# Patient Record
Sex: FEMALE | Race: WHITE | NOT HISPANIC OR LATINO | Employment: OTHER | URBAN - METROPOLITAN AREA
[De-identification: names, ages, dates, MRNs, and addresses within clinical notes are randomized per-mention and may not be internally consistent; named-entity substitution may affect disease eponyms.]

---

## 2017-01-01 ENCOUNTER — APPOINTMENT (OUTPATIENT)
Dept: CT IMAGING | Facility: HOSPITAL | Age: 82
End: 2017-01-01

## 2017-01-01 ENCOUNTER — HOSPITAL ENCOUNTER (INPATIENT)
Facility: HOSPITAL | Age: 82
LOS: 2 days | Discharge: HOME | End: 2017-01-03
Attending: EMERGENCY MEDICINE | Admitting: INTERNAL MEDICINE

## 2017-01-01 ENCOUNTER — APPOINTMENT (OUTPATIENT)
Dept: GENERAL RADIOLOGY | Facility: HOSPITAL | Age: 82
End: 2017-01-01

## 2017-01-01 DIAGNOSIS — J18.9 PNEUMONIA OF RIGHT UPPER LOBE DUE TO INFECTIOUS ORGANISM: Primary | ICD-10-CM

## 2017-01-01 LAB
ALBUMIN SERPL-MCNC: 4.2 G/DL (ref 3.5–5.2)
ALBUMIN/GLOB SERPL: 1.9 G/DL
ALP SERPL-CCNC: 90 U/L (ref 39–117)
ALT SERPL W P-5'-P-CCNC: 14 U/L (ref 1–33)
ANION GAP SERPL CALCULATED.3IONS-SCNC: 13.9 MMOL/L
AST SERPL-CCNC: 25 U/L (ref 1–32)
B PERT DNA SPEC QL NAA+PROBE: NOT DETECTED
BASOPHILS # BLD AUTO: 0.02 10*3/MM3 (ref 0–0.2)
BASOPHILS NFR BLD AUTO: 0.4 % (ref 0–1.5)
BILIRUB SERPL-MCNC: 0.5 MG/DL (ref 0.1–1.2)
BUN BLD-MCNC: 11 MG/DL (ref 8–23)
BUN/CREAT SERPL: 16.4 (ref 7–25)
C PNEUM DNA NPH QL NAA+NON-PROBE: NOT DETECTED
CALCIUM SPEC-SCNC: 8.8 MG/DL (ref 8.6–10.5)
CHLORIDE SERPL-SCNC: 87 MMOL/L (ref 98–107)
CO2 SERPL-SCNC: 26.1 MMOL/L (ref 22–29)
CREAT BLD-MCNC: 0.67 MG/DL (ref 0.57–1)
D-LACTATE SERPL-SCNC: 0.9 MMOL/L (ref 0.5–2)
DEPRECATED RDW RBC AUTO: 53.7 FL (ref 37–54)
EOSINOPHIL # BLD AUTO: 0.01 10*3/MM3 (ref 0–0.7)
EOSINOPHIL NFR BLD AUTO: 0.2 % (ref 0.3–6.2)
ERYTHROCYTE [DISTWIDTH] IN BLOOD BY AUTOMATED COUNT: 15.1 % (ref 11.7–13)
FLUAV H1 2009 PAND RNA NPH QL NAA+PROBE: NOT DETECTED
FLUAV H1 HA GENE NPH QL NAA+PROBE: NOT DETECTED
FLUAV H3 RNA NPH QL NAA+PROBE: DETECTED
FLUAV SUBTYP SPEC NAA+PROBE: NOT DETECTED
FLUBV RNA ISLT QL NAA+PROBE: NOT DETECTED
GFR SERPL CREATININE-BSD FRML MDRD: 83 ML/MIN/1.73
GLOBULIN UR ELPH-MCNC: 2.2 GM/DL
GLUCOSE BLD-MCNC: 110 MG/DL (ref 65–99)
HADV DNA SPEC NAA+PROBE: NOT DETECTED
HCOV 229E RNA SPEC QL NAA+PROBE: NOT DETECTED
HCOV HKU1 RNA SPEC QL NAA+PROBE: NOT DETECTED
HCOV NL63 RNA SPEC QL NAA+PROBE: NOT DETECTED
HCOV OC43 RNA SPEC QL NAA+PROBE: NOT DETECTED
HCT VFR BLD AUTO: 31.8 % (ref 35.6–45.5)
HGB BLD-MCNC: 10.5 G/DL (ref 11.9–15.5)
HMPV RNA NPH QL NAA+NON-PROBE: NOT DETECTED
HOLD SPECIMEN: NORMAL
HOLD SPECIMEN: NORMAL
HPIV1 RNA SPEC QL NAA+PROBE: NOT DETECTED
HPIV2 RNA SPEC QL NAA+PROBE: NOT DETECTED
HPIV3 RNA NPH QL NAA+PROBE: NOT DETECTED
HPIV4 P GENE NPH QL NAA+PROBE: NOT DETECTED
IMM GRANULOCYTES # BLD: 0.03 10*3/MM3 (ref 0–0.03)
IMM GRANULOCYTES NFR BLD: 0.5 % (ref 0–0.5)
LYMPHOCYTES # BLD AUTO: 0.79 10*3/MM3 (ref 0.9–4.8)
LYMPHOCYTES NFR BLD AUTO: 13.9 % (ref 19.6–45.3)
M PNEUMO IGG SER IA-ACNC: NOT DETECTED
MCH RBC QN AUTO: 32 PG (ref 26.9–32)
MCHC RBC AUTO-ENTMCNC: 33 G/DL (ref 32.4–36.3)
MCV RBC AUTO: 97 FL (ref 80.5–98.2)
MONOCYTES # BLD AUTO: 0.88 10*3/MM3 (ref 0.2–1.2)
MONOCYTES NFR BLD AUTO: 15.5 % (ref 5–12)
NEUTROPHILS # BLD AUTO: 3.95 10*3/MM3 (ref 1.9–8.1)
NEUTROPHILS NFR BLD AUTO: 69.5 % (ref 42.7–76)
NT-PROBNP SERPL-MCNC: 965.7 PG/ML (ref 0–1800)
PLATELET # BLD AUTO: 195 10*3/MM3 (ref 140–500)
PMV BLD AUTO: 9.9 FL (ref 6–12)
POTASSIUM BLD-SCNC: 4.1 MMOL/L (ref 3.5–5.2)
PROT SERPL-MCNC: 6.4 G/DL (ref 6–8.5)
RBC # BLD AUTO: 3.28 10*6/MM3 (ref 3.9–5.2)
RHINOVIRUS RNA SPEC NAA+PROBE: NOT DETECTED
RSV RNA NPH QL NAA+NON-PROBE: NOT DETECTED
SODIUM BLD-SCNC: 127 MMOL/L (ref 136–145)
TROPONIN T SERPL-MCNC: <0.01 NG/ML (ref 0–0.03)
WBC NRBC COR # BLD: 5.68 10*3/MM3 (ref 4.5–10.7)
WHOLE BLOOD HOLD SPECIMEN: NORMAL
WHOLE BLOOD HOLD SPECIMEN: NORMAL

## 2017-01-01 PROCEDURE — 83605 ASSAY OF LACTIC ACID: CPT | Performed by: NURSE PRACTITIONER

## 2017-01-01 PROCEDURE — 71020 HC CHEST PA AND LATERAL: CPT

## 2017-01-01 PROCEDURE — 87486 CHLMYD PNEUM DNA AMP PROBE: CPT | Performed by: INTERNAL MEDICINE

## 2017-01-01 PROCEDURE — 25010000002 METHYLPREDNISOLONE PER 125 MG: Performed by: INTERNAL MEDICINE

## 2017-01-01 PROCEDURE — 85025 COMPLETE CBC W/AUTO DIFF WBC: CPT | Performed by: EMERGENCY MEDICINE

## 2017-01-01 PROCEDURE — 94640 AIRWAY INHALATION TREATMENT: CPT

## 2017-01-01 PROCEDURE — 87798 DETECT AGENT NOS DNA AMP: CPT | Performed by: INTERNAL MEDICINE

## 2017-01-01 PROCEDURE — 0 IOPAMIDOL PER 1 ML: Performed by: NURSE PRACTITIONER

## 2017-01-01 PROCEDURE — 87581 M.PNEUMON DNA AMP PROBE: CPT | Performed by: INTERNAL MEDICINE

## 2017-01-01 PROCEDURE — 94799 UNLISTED PULMONARY SVC/PX: CPT

## 2017-01-01 PROCEDURE — 87040 BLOOD CULTURE FOR BACTERIA: CPT | Performed by: NURSE PRACTITIONER

## 2017-01-01 PROCEDURE — 84484 ASSAY OF TROPONIN QUANT: CPT | Performed by: EMERGENCY MEDICINE

## 2017-01-01 PROCEDURE — 83880 ASSAY OF NATRIURETIC PEPTIDE: CPT | Performed by: EMERGENCY MEDICINE

## 2017-01-01 PROCEDURE — 80053 COMPREHEN METABOLIC PANEL: CPT | Performed by: EMERGENCY MEDICINE

## 2017-01-01 PROCEDURE — 93010 ELECTROCARDIOGRAM REPORT: CPT | Performed by: INTERNAL MEDICINE

## 2017-01-01 PROCEDURE — 99284 EMERGENCY DEPT VISIT MOD MDM: CPT

## 2017-01-01 PROCEDURE — 71275 CT ANGIOGRAPHY CHEST: CPT

## 2017-01-01 PROCEDURE — 87633 RESP VIRUS 12-25 TARGETS: CPT | Performed by: INTERNAL MEDICINE

## 2017-01-01 PROCEDURE — 93005 ELECTROCARDIOGRAM TRACING: CPT

## 2017-01-01 PROCEDURE — 25010000003 CEFTRIAXONE PER 250 MG: Performed by: NURSE PRACTITIONER

## 2017-01-01 PROCEDURE — 25010000002 METHYLPREDNISOLONE PER 125 MG: Performed by: NURSE PRACTITIONER

## 2017-01-01 RX ORDER — ALBUTEROL SULFATE 2.5 MG/3ML
2.5 SOLUTION RESPIRATORY (INHALATION)
Status: COMPLETED | OUTPATIENT
Start: 2017-01-01 | End: 2017-01-01

## 2017-01-01 RX ORDER — IPRATROPIUM BROMIDE AND ALBUTEROL SULFATE 2.5; .5 MG/3ML; MG/3ML
3 SOLUTION RESPIRATORY (INHALATION) EVERY 4 HOURS PRN
Status: DISCONTINUED | OUTPATIENT
Start: 2017-01-01 | End: 2017-01-03 | Stop reason: HOSPADM

## 2017-01-01 RX ORDER — METHYLPREDNISOLONE SODIUM SUCCINATE 125 MG/2ML
125 INJECTION, POWDER, LYOPHILIZED, FOR SOLUTION INTRAMUSCULAR; INTRAVENOUS ONCE
Status: COMPLETED | OUTPATIENT
Start: 2017-01-01 | End: 2017-01-01

## 2017-01-01 RX ORDER — ATORVASTATIN CALCIUM 40 MG/1
40 TABLET, FILM COATED ORAL DAILY
Status: DISCONTINUED | OUTPATIENT
Start: 2017-01-01 | End: 2017-01-03 | Stop reason: HOSPADM

## 2017-01-01 RX ORDER — HYDROCODONE BITARTRATE AND ACETAMINOPHEN 5; 325 MG/1; MG/1
1 TABLET ORAL EVERY 4 HOURS PRN
Status: DISCONTINUED | OUTPATIENT
Start: 2017-01-01 | End: 2017-01-03 | Stop reason: HOSPADM

## 2017-01-01 RX ORDER — VORICONAZOLE 200 MG/1
200 TABLET, FILM COATED ORAL 2 TIMES DAILY
Status: DISCONTINUED | OUTPATIENT
Start: 2017-01-01 | End: 2017-01-03 | Stop reason: HOSPADM

## 2017-01-01 RX ORDER — METHYLPREDNISOLONE SODIUM SUCCINATE 125 MG/2ML
60 INJECTION, POWDER, LYOPHILIZED, FOR SOLUTION INTRAMUSCULAR; INTRAVENOUS EVERY 6 HOURS
Status: DISCONTINUED | OUTPATIENT
Start: 2017-01-01 | End: 2017-01-02

## 2017-01-01 RX ORDER — SODIUM CHLORIDE 0.9 % (FLUSH) 0.9 %
10 SYRINGE (ML) INJECTION AS NEEDED
Status: DISCONTINUED | OUTPATIENT
Start: 2017-01-01 | End: 2017-01-03 | Stop reason: HOSPADM

## 2017-01-01 RX ORDER — ACETAMINOPHEN 325 MG/1
650 TABLET ORAL EVERY 4 HOURS PRN
Status: DISCONTINUED | OUTPATIENT
Start: 2017-01-01 | End: 2017-01-03 | Stop reason: HOSPADM

## 2017-01-01 RX ORDER — BISACODYL 10 MG
10 SUPPOSITORY, RECTAL RECTAL DAILY PRN
Status: DISCONTINUED | OUTPATIENT
Start: 2017-01-01 | End: 2017-01-03 | Stop reason: HOSPADM

## 2017-01-01 RX ORDER — SODIUM CHLORIDE 0.9 % (FLUSH) 0.9 %
1-10 SYRINGE (ML) INJECTION AS NEEDED
Status: DISCONTINUED | OUTPATIENT
Start: 2017-01-01 | End: 2017-01-03 | Stop reason: HOSPADM

## 2017-01-01 RX ORDER — FUROSEMIDE 40 MG/1
40 TABLET ORAL DAILY
Status: DISCONTINUED | OUTPATIENT
Start: 2017-01-01 | End: 2017-01-03 | Stop reason: HOSPADM

## 2017-01-01 RX ORDER — CEFTRIAXONE SODIUM 1 G/50ML
1 INJECTION, SOLUTION INTRAVENOUS ONCE
Status: COMPLETED | OUTPATIENT
Start: 2017-01-01 | End: 2017-01-01

## 2017-01-01 RX ORDER — ONDANSETRON 2 MG/ML
4 INJECTION INTRAMUSCULAR; INTRAVENOUS EVERY 6 HOURS PRN
Status: DISCONTINUED | OUTPATIENT
Start: 2017-01-01 | End: 2017-01-03 | Stop reason: HOSPADM

## 2017-01-01 RX ORDER — IPRATROPIUM BROMIDE AND ALBUTEROL SULFATE 2.5; .5 MG/3ML; MG/3ML
3 SOLUTION RESPIRATORY (INHALATION)
Status: DISCONTINUED | OUTPATIENT
Start: 2017-01-01 | End: 2017-01-02

## 2017-01-01 RX ORDER — ONDANSETRON 4 MG/1
4 TABLET, ORALLY DISINTEGRATING ORAL EVERY 6 HOURS PRN
Status: DISCONTINUED | OUTPATIENT
Start: 2017-01-01 | End: 2017-01-03 | Stop reason: HOSPADM

## 2017-01-01 RX ORDER — ONDANSETRON 4 MG/1
4 TABLET, FILM COATED ORAL EVERY 6 HOURS PRN
Status: DISCONTINUED | OUTPATIENT
Start: 2017-01-01 | End: 2017-01-03 | Stop reason: HOSPADM

## 2017-01-01 RX ORDER — DILTIAZEM HYDROCHLORIDE 180 MG/1
180 CAPSULE, COATED, EXTENDED RELEASE ORAL
Status: DISCONTINUED | OUTPATIENT
Start: 2017-01-01 | End: 2017-01-03 | Stop reason: HOSPADM

## 2017-01-01 RX ORDER — SULFAMETHOXAZOLE AND TRIMETHOPRIM 800; 160 MG/1; MG/1
1 TABLET ORAL 2 TIMES DAILY
COMMUNITY
End: 2017-01-10 | Stop reason: HOSPADM

## 2017-01-01 RX ORDER — DABIGATRAN ETEXILATE 150 MG/1
150 CAPSULE ORAL EVERY 12 HOURS SCHEDULED
Status: DISCONTINUED | OUTPATIENT
Start: 2017-01-01 | End: 2017-01-03 | Stop reason: HOSPADM

## 2017-01-01 RX ADMIN — METHYLPREDNISOLONE SODIUM SUCCINATE 60 MG: 125 INJECTION, POWDER, FOR SOLUTION INTRAMUSCULAR; INTRAVENOUS at 22:40

## 2017-01-01 RX ADMIN — ALBUTEROL SULFATE 2.5 MG: 2.5 SOLUTION RESPIRATORY (INHALATION) at 16:14

## 2017-01-01 RX ADMIN — IOPAMIDOL 95 ML: 755 INJECTION, SOLUTION INTRAVENOUS at 13:11

## 2017-01-01 RX ADMIN — DABIGATRAN ETEXILATE MESYLATE 150 MG: 150 CAPSULE ORAL at 20:43

## 2017-01-01 RX ADMIN — CEFTRIAXONE SODIUM 1 G: 1 INJECTION, SOLUTION INTRAVENOUS at 22:26

## 2017-01-01 RX ADMIN — IPRATROPIUM BROMIDE AND ALBUTEROL SULFATE 3 ML: .5; 3 SOLUTION RESPIRATORY (INHALATION) at 20:30

## 2017-01-01 RX ADMIN — METHYLPREDNISOLONE SODIUM SUCCINATE 125 MG: 125 INJECTION, POWDER, FOR SOLUTION INTRAMUSCULAR; INTRAVENOUS at 16:09

## 2017-01-01 RX ADMIN — ALBUTEROL SULFATE 2.5 MG: 2.5 SOLUTION RESPIRATORY (INHALATION) at 16:12

## 2017-01-01 RX ADMIN — ATORVASTATIN CALCIUM 40 MG: 40 TABLET, FILM COATED ORAL at 20:43

## 2017-01-01 RX ADMIN — IPRATROPIUM BROMIDE AND ALBUTEROL SULFATE 3 ML: .5; 3 SOLUTION RESPIRATORY (INHALATION) at 23:47

## 2017-01-01 RX ADMIN — ALBUTEROL SULFATE 2.5 MG: 2.5 SOLUTION RESPIRATORY (INHALATION) at 16:09

## 2017-01-01 RX ADMIN — VORICONAZOLE 200 MG: 200 TABLET, FILM COATED ORAL at 20:43

## 2017-01-01 RX ADMIN — DOXYCYCLINE 100 MG: 100 INJECTION, POWDER, LYOPHILIZED, FOR SOLUTION INTRAVENOUS at 18:20

## 2017-01-01 RX ADMIN — DILTIAZEM HYDROCHLORIDE 180 MG: 180 CAPSULE, COATED, EXTENDED RELEASE ORAL at 18:28

## 2017-01-01 NOTE — H&P
[place] [facility]  HISTORY AND PHYSICAL   [facility]        Patient Identification:  Name: Agnieszka Rizzo  Age: 86 y.o.  Sex: female  :  1930  MRN: [id number]                     Primary Care Physician: Gabe Horne MD    Chief Complaint:  Cough and soa    History of Present Illness:   87 yo sees Dr Tucker with recent Dx Aspirgillus presents with c/o soa and cough.  Cough moderate in nature and mostly nonproductive.  No CP.  SOA worse with activity and has associated wheeze.  Has worsened in last few days.  Has been on Vfend.  CT shows signs of atypical, possibly viral PNA.  Received steroid and nebs in ER and request to admit.      Past Medical History:  Past Medical History   Diagnosis Date   • A-fib    • Abdominal distension    • Acute hypoxemic respiratory failure    • Asthma    • Atrial fibrillation    • Bigeminy    • Bronchiectasis    • Chest tightness    • Colitis    • Colitis    • Cough    • Dyspnea    • History of pneumonia      MAY 2016   • Hyperlipidemia    • Hypertension    • Hypoxia    • Infectious viral hepatitis      AGE 13   • Lesion of lung    • SOB (shortness of breath)    • Wheeze      mild     Past Surgical History:  Past Surgical History   Procedure Laterality Date   • Hysterectomy     • D&c with suction     • Cataract extraction extracapsular w/ intraocular lens implantation     • Knee arthroscopy Left    • Colonoscopy          • Bronchoscopy N/A 2016     Procedure: BRONCHOSCOPY WITH FLUORO, BRUSHINGS, BAL, AND BIOPSIES;  Surgeon: Rogelio Tucker MD;  Location: [facility] ENDOSCOPY;  Service:       Home Meds:    (Not in a hospital admission)    Allergies:  Allergies   Allergen Reactions   • Erythromycin    • Levaquin [Levofloxacin]    • Macrobid [Nitrofurantoin]      Immunizations:    There is no immunization history on file for this patient.  Social History:   Social History     Social History Narrative     Social History   Substance Use Topics   •  "Smoking status: Never Smoker   • Smokeless tobacco: Never Used   • Alcohol use No     Family History:  Family History   Problem Relation Age of Onset   • Hypertension Mother    • Hypertension Father    • Stroke Father    • Cancer Son         Review of Systems  Denies fevers or chills  Denies nausea or vomiting  No new vision or hearing changes  No chest pain  pulm as above  No diarrhea, hematemesis or hematochezia, no dysuria or frequency  No musculoskeletal complaints  No heat or cold intolerance  No skin rashes  No dizziness or confusion.  No seizure activity  No new anxiety or depression  12 system review of systems performed and all else negative    Objective:  tMax 24 hrs: Temp (24hrs), Av.6 °F (37.6 °C), Min:99.6 °F (37.6 °C), Max:99.6 °F (37.6 °C)    Vitals Ranges:   Temp:  [99.6 °F (37.6 °C)] 99.6 °F (37.6 °C)  Heart Rate:  [80-98] 93  Resp:  [20] 20  BP: (119-146)/(67-82) 146/72      Exam:  Visit Vitals   • /72   • Pulse 93   • Temp 99.6 °F (37.6 °C) (Tympanic)   • Resp 20   • Ht 63\" (160 cm)   • Wt 128 lb (58.1 kg)   • SpO2 95%   • BMI 22.67 kg/m2       General Appearance:    Alert, cooperative, no distress, appears stated age   Head:    Normocephalic, without obvious abnormality, atraumatic   Eyes:    PERRL, conjunctiva/corneas clear, EOM's intact, both eyes   Ears:    Normal external ear canals, both ears   Nose:   Nares normal, septum midline, mucosa normal, no drainage    or sinus tenderness   Throat:   Lips, mucosa, and tongue normal   Neck:   Supple, symmetrical, trachea midline, no adenopathy;     thyroid:  no enlargement/tenderness/nodules; no carotid    bruit or JVD   Back:     Symmetric, no curvature, ROM normal, no CVA tenderness   Lungs:     Course wheeze on auscultation bilaterally, respirations mildly labored   Chest Wall:    No tenderness or deformity    Heart:    Regular rate and rhythm, S1 and S2 normal, no murmur, rub   or gallop   Abdomen:     Soft, non-tender, bowel sounds " active all four quadrants,     no masses, no hepatomegaly, no splenomegaly   Extremities:   Extremities normal, atraumatic, no cyanosis or edema   Pulses:   2+ and symmetric all extremities   Skin:   Skin color, texture, turgor normal, no rashes or lesions   Lymph nodes:   Cervical, supraclavicular, and axillary nodes normal   Neurologic:   CNII-XII intact, normal strength, sensation intact throughout      .    Data Review:  Lab Results   Component Value Date    WBC 5.68 01/01/2017    HGB 10.5 (L) 01/01/2017    HCT 31.8 (L) 01/01/2017     01/01/2017     Lab Results   Component Value Date     (L) 01/01/2017    K 4.1 01/01/2017    CL 87 (L) 01/01/2017    CO2 26.1 01/01/2017    BUN 11 01/01/2017    CREATININE 0.67 01/01/2017    GLUCOSE 110 (H) 01/01/2017     Lab Results   Component Value Date    CALCIUM 8.8 01/01/2017     Lab Results   Component Value Date    AST 25 01/01/2017    ALT 14 01/01/2017    ALKPHOS 90 01/01/2017     No results found for: APTT, INR  No results found for: COLORU, CLARITYU, SPECGRAV, PHUR, PROTEINUR, GLUCOSEU, KETONESU, BLOODU, NITRITE, LEUKOCYTESUR, BILIRUBINUR, UROBILINOGEN, RBCUA, WBCUA, BACTERIA  Lab Results   Component Value Date    TROPONINT <0.010 01/01/2017      chest X-ray and CT chest viewed    Assessment:  Atypical PNA  SOA  Acute bronchospasm/asthma exacerbation  Aspergillus pulmonary infection  Chronic A-fib    Plan:  Suspect atypical or viral PNA on top of her fungal process and bronchospasm with asthma exacerbation.  Admit with Abx and check resp culture/RVP.  Bronchodilators/steroids for bronchospasm.  Wean oxygen as able.    Gerhard Camilo MD  [facility], [facility]  Pulmonary and Critical Care Medicine    1/1/2017  3:57 PM

## 2017-01-01 NOTE — ED PROVIDER NOTES
EMERGENCY DEPARTMENT ENCOUNTER    CHIEF COMPLAINT  Chief Complaint: Shortness of breath  History given by:Pt, family  History limited by:N/A  Room Number: 03/03  PMD: Gabe Horne MD      HPI:  Pt is a 86 y.o. female who presents with worsening shortness of breath and nonproductive cough over the past 3 days. She was seen at Urgent Care 3 days ago secondary to cough. Pt states that she has mold in her lung, which was diagnosed via bronchoscopy in November, has been on antibiotics since. Dr. Tucker is her pulmonologist, who has been managing the problem. She reports starting medication for low sodium 2 weeks ago. Patient also complains of mild fever.  Patient denies leg swelling.  Past Medical History of Afib and hypertension.    Duration: 3 days  Timing:Constant  Location:Respiratory  Radiation:None  Quality:N/A  Intensity/Severity:Moderate  Progression:Worsening  Associated Symptoms:Nonproductive cough, mild fever  Aggravating Factors:None specified  Alleviating Factors:Nothing  Previous Episodes:Yes  Treatment before arrival:Seen by Urgent Care 3 days ago    PAST MEDICAL HISTORY  Active Ambulatory Problems     Diagnosis Date Noted   • Pneumothorax of right lung after biopsy 11/12/2016   • Pulmonary aspergillosis 11/16/2016   • Benign essential hypertension 12/01/2016   • Chronic coronary artery disease 12/01/2016   • Diastolic dysfunction 12/01/2016   • Hyperlipidemia 12/01/2016   • Mitral valve insufficiency 12/01/2016   • Ventricular premature beats 12/01/2016   • Ventricular tachycardia 12/01/2016   • Persistent atrial fibrillation 12/01/2016     Resolved Ambulatory Problems     Diagnosis Date Noted   • No Resolved Ambulatory Problems     Past Medical History   Diagnosis Date   • A-fib    • Abdominal distension    • Acute hypoxemic respiratory failure    • Asthma    • Atrial fibrillation    • Bigeminy    • Bronchiectasis    • Chest tightness    • Colitis    • Colitis    • Cough    • Dyspnea    •  History of pneumonia    • Hypertension    • Hypoxia    • Infectious viral hepatitis    • Lesion of lung    • SOB (shortness of breath)    • Wheeze        PAST SURGICAL HISTORY  Past Surgical History   Procedure Laterality Date   • Hysterectomy     • D&c with suction     • Cataract extraction extracapsular w/ intraocular lens implantation     • Knee arthroscopy Left    • Colonoscopy       2013   • Bronchoscopy N/A 11/12/2016     Procedure: BRONCHOSCOPY WITH FLUORO, BRUSHINGS, BAL, AND BIOPSIES;  Surgeon: Rogelio Tucker MD;  Location: [facility] ENDOSCOPY;  Service:        FAMILY HISTORY  Family History   Problem Relation Age of Onset   • Hypertension Mother    • Hypertension Father    • Stroke Father    • Cancer Son        SOCIAL HISTORY  Social History     Social History   • Marital status:      Spouse name: N/A   • Number of children: N/A   • Years of education: N/A     Occupational History   • Not on file.     Social History Main Topics   • Smoking status: Never Smoker   • Smokeless tobacco: Never Used   • Alcohol use No   • Drug use: No   • Sexual activity: Yes     Partners: Male     Other Topics Concern   • Not on file     Social History Narrative         ALLERGIES  Erythromycin; Levaquin [levofloxacin]; and Macrobid [nitrofurantoin]    REVIEW OF SYSTEMS  Review of Systems   Constitutional: Positive for fever (mild). Negative for chills.   HENT: Negative for sore throat.    Respiratory: Positive for cough and shortness of breath.    Cardiovascular: Negative for chest pain.   Gastrointestinal: Negative for nausea and vomiting.   Genitourinary: Negative for dysuria.   Musculoskeletal: Negative for back pain.   Skin: Negative for rash.   Neurological: Negative for dizziness.   Psychiatric/Behavioral: The patient is not nervous/anxious.        PHYSICAL EXAM  ED Triage Vitals   Temp Heart Rate Resp BP SpO2   01/01/17 1015 01/01/17 1015 01/01/17 1015 01/01/17 1020 01/01/17 1015   99.6 °F (37.6 °C) 98 20 137/67 97 %       Temp src Heart Rate Source Patient Position BP Location FiO2 (%)   01/01/17 1015 -- 01/01/17 1020 01/01/17 1020 --   Tympanic  Sitting Left arm        Physical Exam   Constitutional: She is well-developed, well-nourished, and in no distress. She appears distressed (mild).   HENT:   Head: Normocephalic.   Mouth/Throat: Mucous membranes are normal.   Eyes: No scleral icterus.   Neck: Normal range of motion.   Cardiovascular: Normal rate, regular rhythm and normal heart sounds.    Pulmonary/Chest: Effort normal and breath sounds normal. Tachypnea noted. Rhonchi: throughout.   Abdominal: Soft. Bowel sounds are normal. There is no tenderness. There is no rebound and no guarding.   Musculoskeletal: Normal range of motion. She exhibits edema (mild, bilateral lower extremity).   Neurological: She is alert.   Skin: Skin is warm and dry.   Psychiatric: Mood and affect normal.   Nursing note and vitals reviewed.      LAB RESULTS  Recent Results (from the past 24 hour(s))   Comprehensive Metabolic Panel    Collection Time: 01/01/17 11:35 AM   Result Value Ref Range    Glucose 110 (H) 65 - 99 mg/dL    BUN 11 8 - 23 mg/dL    Creatinine 0.67 0.57 - 1.00 mg/dL    Sodium 127 (L) 136 - 145 mmol/L    Potassium 4.1 3.5 - 5.2 mmol/L    Chloride 87 (L) 98 - 107 mmol/L    CO2 26.1 22.0 - 29.0 mmol/L    Calcium 8.8 8.6 - 10.5 mg/dL    Total Protein 6.4 6.0 - 8.5 g/dL    Albumin 4.20 3.50 - 5.20 g/dL    ALT (SGPT) 14 1 - 33 U/L    AST (SGOT) 25 1 - 32 U/L    Alkaline Phosphatase 90 39 - 117 U/L    Total Bilirubin 0.5 0.1 - 1.2 mg/dL    eGFR Non African Amer 83 >60 mL/min/1.73    Globulin 2.2 gm/dL    A/G Ratio 1.9 g/dL    BUN/Creatinine Ratio 16.4 7.0 - 25.0    Anion Gap 13.9 mmol/L   BNP    Collection Time: 01/01/17 11:35 AM   Result Value Ref Range    proBNP 965.7 0.0 - 1800.0 pg/mL   Troponin    Collection Time: 01/01/17 11:35 AM   Result Value Ref Range    Troponin T <0.010 0.000 - 0.030 ng/mL   Light Blue Top    Collection Time:  01/01/17 11:35 AM   Result Value Ref Range    Extra Tube hold for add-on    Green Top (Gel)    Collection Time: 01/01/17 11:35 AM   Result Value Ref Range    Extra Tube Hold for add-ons.    Lavender Top    Collection Time: 01/01/17 11:35 AM   Result Value Ref Range    Extra Tube hold for add-on    CBC Auto Differential    Collection Time: 01/01/17 11:35 AM   Result Value Ref Range    WBC 5.68 4.50 - 10.70 10*3/mm3    RBC 3.28 (L) 3.90 - 5.20 10*6/mm3    Hemoglobin 10.5 (L) 11.9 - 15.5 g/dL    Hematocrit 31.8 (L) 35.6 - 45.5 %    MCV 97.0 80.5 - 98.2 fL    MCH 32.0 26.9 - 32.0 pg    MCHC 33.0 32.4 - 36.3 g/dL    RDW 15.1 (H) 11.7 - 13.0 %    RDW-SD 53.7 37.0 - 54.0 fl    MPV 9.9 6.0 - 12.0 fL    Platelets 195 140 - 500 10*3/mm3    Neutrophil % 69.5 42.7 - 76.0 %    Lymphocyte % 13.9 (L) 19.6 - 45.3 %    Monocyte % 15.5 (H) 5.0 - 12.0 %    Eosinophil % 0.2 (L) 0.3 - 6.2 %    Basophil % 0.4 0.0 - 1.5 %    Immature Grans % 0.5 0.0 - 0.5 %    Neutrophils, Absolute 3.95 1.90 - 8.10 10*3/mm3    Lymphocytes, Absolute 0.79 (L) 0.90 - 4.80 10*3/mm3    Monocytes, Absolute 0.88 0.20 - 1.20 10*3/mm3    Eosinophils, Absolute 0.01 0.00 - 0.70 10*3/mm3    Basophils, Absolute 0.02 0.00 - 0.20 10*3/mm3    Immature Grans, Absolute 0.03 0.00 - 0.03 10*3/mm3   Gold Top - SST    Collection Time: 01/01/17 11:36 AM   Result Value Ref Range    Extra Tube Hold for add-ons.    Lactic Acid, Plasma    Collection Time: 01/01/17 12:25 PM   Result Value Ref Range    Lactate 0.9 0.5 - 2.0 mmol/L       I ordered the above labs and reviewed the results    RADIOLOGY  CT Angiogram Chest With Contrast   Final Result   1. Patchy right-sided pneumonia.       This report was finalized on 1/1/2017 1:21 PM by Dr. Js Lao MD.          XR Chest 2 View   Final Result      CXR:  1. Developing acute right upper lobe pneumonia at site of previously  described nodular focus of consolidation.  2. No other interval change.  3. Recommend follow up to confirm  resolution, CT would be helpful.    CTA Chest:  1. There is no PE.  2. Diffuse vascular calcifications including aortic and coronary  arteries.  3. Cardiomegaly, mild pulmonary artery hypertension.  4. Acute right middle lobe and right upper lobe and minimal right lower lobe pneumonia.  5. Atelectasis versus scarring in the lingula and left base.  6. Recommend delayed follow up CT to confirm complete resolution.   7. Left renal cyst benign hepatic cysts.    I ordered the above noted radiological studies and reviewed the images on the PACS system.  Spoke with radiologist regarding CT scan results        EKG    ekg was interpreted by Dr. Chavarria, see his note for interpretation      MEDICAL RECORD REVIEW  Date of Admission: 11/12/2016  Date of Discharge: 11/16/2016     Discharge Diagnosis:  Iatrogenic pneumothorax-improved  Acute hypoxic respiratory failure-resolved  Nodular pulmonary infiltrate-improved  RML bronchiectasis  Moderate persistent asthma - mild wheeze     Presenting Problem/History of Present Illness  The patient is a 86 year old female with history of asthma. She was last seen in our office on 10/17/16 . She had a recent CT chest. . She continues to have some cough. She does have hoarseness(chronic). It is not worse. At times it is better. it is intermittent. She has been contemplating starting allergy shots with [place] allergy and asthma. She was found to have elevated HgB [facility] She has nocturnal hypoxemia on HST. However, on overnignt oximetry done subsequently she had no desaturation.Her CT chest showed mixed interval change. A new reticular nodular infiltrate has developed within the right upper lobe. Patchy area of interstitial infiltrate with nodular foci of consolidation right lower lobe completely resolved. The remaining areas of airspace disease including small nodules were similar to 04/06/2016. She was scheduled for outpatient bronchoscopy on  11/12/16.     Hospital Course  She developed chest pain after bronchoscopy with associated hypoxia. She had wheezing and received steroids. She had decreased BS on the left. Her CxR showed left pneumothorax. She was therefore admitted to [facility] for observation. She was given pain medications and Benzonotate. She had EKG performed and it showed atrial fibrillation. She was seen by cardiology and had ECHO. Her atrial fibrillation was new. She has mitral valve prolapse that was not significant.She has a CHADS2-Vasc score of 5 which gives her 4% annual stroke risk. Her rate was controlled.  Her creatinine clearance was 53 mL/m. Cardiology preferred giving her Pradaxa 150 mg twice a day. She will need to have ischemia w/u as outpatient.     She was also seen by hematology. 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 She had monoclonal IgM on SPEP. She has declined BM biopsy given her age. 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      From a hematologic standpoint, I think it is okay for anticoagulation with anticoagulant of cardiology's choice for atrial fibrillation which is Pradaxa. Of course, if patient develops signs of bleeding, she will discontinue. Patient will follow up with Dr Burton as previously scheduled. Her biopsy showed yeast and mold. I will start her on Vfend 300mg bid x 7 days then 200mg bid for total 6-10 weeks. Will check on cost first.      Specimen Information: Lung, Right Upper Lobe; Lavage        Culture   Acid Fast Bacilli isolated (C)   Refer to scanned report on 12/21      Sent to [facility] for ID/ANNA 12/15/16    Faxed preliminary report to Dr. Tucker's office 12/21/16 by [id number]           Specimen Information: Lung, Right Upper Lobe; Lavage        Culture   Candida  albicans (A)      Aspergillus species (A)      Resulting Agency: [facility]      Specimen Collected: 11/12/16  3:10 PM Last Resulted: 11/15/16 10:43 AM                    PROGRESS AND CONSULTS  11:33 AM:  Vitals: BP: 137/67 HR: 98 Temp: 99.6 °F (37.6 °C) (Tympanic) O2 sat: 97%  D/w pt plan for labs, CXR, CTA Chest, and EKG for further evaluation. Pt understands and agrees with the plan, all questions answered.    3:04 PM:  Reviewed pt's history and workup with Dr. Chavarria.  At bedside evaluation, they agree with the plan of care. He requests consult with Dr. Tucker (Pulmonology) to determine if admission is warranted.    3:23 PM:  Discussed pt's case with Dr. Camilo (Pulmonology), who has reviewed the pt's workup. He believes the pt can be discharged.    3:55 PM:  Discussed pt's case with Dr. Chavarria. He has spoken to Dr. Camilo, who will admit the pt to med-surg for further care.    ADMISSION    Discussed treatment plan and reason for admission with pt/family and admitting physician.  Pt/family voiced understanding of the plan for admission for further testing/treatment as needed.      DIAGNOSIS  Final diagnoses:   Pneumonia of right upper lobe due to infectious organism       COURSE & MEDICAL DECISION MAKING  Pertinent Labs and Imaging studies that were ordered and reviewed are noted above.  Results were reviewed/discussed with the patient and they were also made aware of online assess.   Pt also made aware that some labs, such as cultures, will not be resulted during ER visit and follow up with PMD is necessary.     MEDICATIONS GIVEN IN ER  Medications   sodium chloride 0.9 % flush 10 mL (not administered)   sodium chloride 0.9 % flush 10 mL (not administered)   albuterol (PROVENTIL) nebulizer solution 2.5 mg (not administered)   methylPREDNISolone sodium succinate (SOLU-Medrol) injection 125 mg (not administered)   sodium chloride 0.9 % flush 1-10 mL (not administered)   acetaminophen (TYLENOL) tablet 650 mg  "(not administered)   HYDROcodone-acetaminophen (NORCO) 5-325 MG per tablet 1 tablet (not administered)   bisacodyl (DULCOLAX) EC tablet 5 mg (not administered)   bisacodyl (DULCOLAX) suppository 10 mg (not administered)   ondansetron (ZOFRAN) tablet 4 mg (not administered)     Or   ondansetron ODT (ZOFRAN-ODT) disintegrating tablet 4 mg (not administered)     Or   ondansetron (ZOFRAN) injection 4 mg (not administered)   ipratropium-albuterol (DUO-NEB) nebulizer solution 3 mL (not administered)   ipratropium-albuterol (DUO-NEB) nebulizer solution 3 mL (not administered)   methylPREDNISolone sodium succinate (SOLU-Medrol) injection 60 mg (not administered)   doxycycline (VIBRAMYCIN) 100 mg/100 mL 0.9% NS MBP (not administered)   atorvastatin (LIPITOR) tablet 40 mg (not administered)   dabigatran etexilate (PRADAXA) capsule 150 mg (not administered)   diltiaZEM CD (CARDIZEM CD) 24 hr capsule 180 mg (not administered)   furosemide (LASIX) tablet 40 mg (not administered)   voriconazole (VFEND) tablet 200 mg (not administered)   cefTRIAXone (ROCEPHIN) IVPB 1 g (not administered)   doxycycline (VIBRAMYCIN) 100 mg/100 mL 0.9% NS MBP (not administered)   iopamidol (ISOVUE-370) 76 % injection 100 mL (95 mL Intravenous Given 1/1/17 1311)       Visit Vitals   • /72   • Pulse 93   • Temp 99.6 °F (37.6 °C) (Tympanic)   • Resp 20   • Ht 63\" (160 cm)   • Wt 128 lb (58.1 kg)   • SpO2 95%   • BMI 22.67 kg/m2         I personally reviewed the past medical history, past surgical history, social history, family history, current medications and allergies as they appear in this chart.  The scribe's note accurately reflects the work and decisions made by me.     I personally scribed for ROSALIND Choi on 1/1/2017 at 4:06 PM.  Electronically signed by Roel De Leon on 1/1/2017 at time 4:06 MACHO De Leon  01/01/17 1606       Christine Beckett, APRN  01/04/17 0851    "

## 2017-01-01 NOTE — PLAN OF CARE
Problem: Patient Care Overview (Adult)  Goal: Plan of Care Review  Outcome: Ongoing (interventions implemented as appropriate)    01/01/17 5108   Coping/Psychosocial Response Interventions   Plan Of Care Reviewed With patient   Patient Care Overview   Progress no change   Outcome Evaluation   Outcome Summary/Follow up Plan no c/o pain, soa with exertion, O2 2l n/c, occ productive cough, lungs with course wheezes, up the help , pna booklet given, bed alarm in place       Goal: Adult Individualization and Mutuality  Outcome: Ongoing (interventions implemented as appropriate)  Goal: Discharge Needs Assessment  Outcome: Ongoing (interventions implemented as appropriate)    Problem: Pneumonia (Adult)  Goal: Signs and Symptoms of Listed Potential Problems Will be Absent or Manageable (Pneumonia)  Outcome: Ongoing (interventions implemented as appropriate)    Problem: Fall Risk (Adult)  Goal: Identify Related Risk Factors and Signs and Symptoms  Outcome: Ongoing (interventions implemented as appropriate)  Goal: Absence of Falls  Outcome: Ongoing (interventions implemented as appropriate)

## 2017-01-01 NOTE — Clinical Note
Level of Care: Med/Surg [1]   Diagnosis: Pneumonia of right upper lobe due to infectious organism [[id number]]   Admitting Physician: MARTHA SAPP [[id number]]   Attending Physician: MARTHA SAPP [[id number]]

## 2017-01-01 NOTE — ED PROVIDER NOTES
Attending Note    History:   Pt arrives to the ED c/o worsening SOB and dry cough over the past 3 days. 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 Pt was seen in the [facility] on Thursday for her return of cough and congestion and SOB. Pt had a CXR and the doctor consulted with Dr. Tucker. Started on Bactrim. Pt admits subjective fever.     Pt has been using a nebulizer and finds transient relief with it but has not had one today.     Pt is on an abx for a fungal infection since 11/16.    Exam:   Mild respiratory distress. Rhonchi and expiratory wheezes.  Has audible congestion with cough and with breathing.     Course:   CXR/ CT scan  shows acute right upper lobe pneumonia.     . Consult with Dr. Tucker. Administer breathing treatments and steroids. Admit for further evaluation and treatment.     I am concerned about the pt going home as she is in some mild respiratory distress and no improvement with current home treatment. I will call Dr. Camilo back in consult.    [id number]  Discussed case with Dr Camilo (pulmonology)  Reviewed history, exam, results and treatments.  Discussed concerns and plan of care and he agrees to admit.     Attestation:  I supervised care provided by the midlevel provider.    We have discussed this patient's history, physical exam, and treatment plan.   I have reviewed the note and personally saw and examined the patient and agree with the plan of care.  I agree with the midlevel provider's findings and plan.  I reviewed the midlevel providers note.    Documentation assistance provided by danii Melvin for Dr. Chavarria.  Information recorded by the scribe was done at my direction and has been verified and validated by me.       Gavino Melvin  01/01/17 [id number]       Gerhard Chavarria MD  01/01/17 0580

## 2017-01-01 NOTE — IP AVS SNAPSHOT
AFTER VISIT SUMMARY             Agnieszka Rizzo           About your hospitalization     You were admitted on:  January 1, 2017 You last received care in the:  [address]       Procedures & Surgeries         Medications    If you or your caregiver advised us that you are currently taking a medication and that medication is marked below as “Resume”, this simply indicates that we have reviewed those medications to make sure our new therapy recommendations do not interfere.  If you have concerns about medications other than those new ones which we are prescribing today, please consult the physician who prescribed them (or your primary physician).  Our review of your home medications is not meant to indicate that we are directing their use.             Your Medications      START taking these medications     predniSONE 10 MG tablet   Take 4 tabs daily x 3 days, then take 3 tabs daily x 3 days, then take 2 tabs daily x 3 days, then take 1 tab daily x 3 days   Last time this was given:  1/3/2017  9:45 AM   Commonly known as:  DELTASONE             CONTINUE taking these medications     ADVAIR -21 MCG/ACT inhaler   Inhale 2 puffs 2 (Two) Times a Day.   Generic drug:  fluticasone-salmeterol           ALLERGY RELIEF 10 MG tablet   Take 10 mg by mouth Daily.   Generic drug:  loratadine           atorvastatin 40 MG tablet   Take 40 mg by mouth Daily.   Last time this was given:  1/2/2017  8:19 PM   Commonly known as:  LIPITOR           calcium carbonate 600 MG tablet   Take 600 mg by mouth 2 (Two) Times a Day With Meals.   Commonly known as:  OS-EVIN           CARTIA  MG 24 hr capsule   Take  by mouth.   Last time this was given:  1/3/2017  9:41 AM   Generic drug:  diltiaZEM CD           cholecalciferol 1000 UNITS tablet   Take 1,000 Units by mouth Daily.   Commonly known as:  VITAMIN D3           dabigatran etexilate 150 MG capsu   Take 1 capsule by mouth Every 12 (Twelve) Hours.   Last  time this was given:  1/3/2017  8:37 AM   Commonly known as:  PRADAXA           furosemide 40 MG tablet   TAKE 1 TABLET BY MOUTH DAILY   Last time this was given:  1/3/2017  9:43 AM   Commonly known as:  LASIX           furosemide 40 MG tablet   Take 1 tablet by mouth Daily.   Last time this was given:  1/3/2017  9:43 AM   Commonly known as:  LASIX           Glucosamine-Chondroitin tablet   Take 1 tablet by mouth Daily.           ipratropium-albuterol 0.5-2.5 mg/mL nebulizer   Take 3 mL by nebulization 4 (Four) Times a Day.   Last time this was given:  1/3/2017 11:57 AM   Commonly known as:  DUO-NEB           losartan 50 MG tablet   50 mg 2 (Two) Times a Day.   Commonly known as:  COZAAR           multivitamin tablet   Take 1 tablet by mouth Daily.           sulfamethoxazole-trimethoprim 800-160 MG per tablet   Take 1 tablet by mouth 2 (Two) Times a Day.   Commonly known as:  BACTRIM DS,SEPTRA DS           SUPER B COMPLEX PO   Take  by mouth.           VERAMYST 27.5 MCG/SPRAY nasal spray   1 spray into each nostril Daily.   Generic drug:  fluticasone           voriconazole 200 MG tablet   Take 200 mg by mouth 2 (Two) Times a Day.   Last time this was given:  1/3/2017  8:37 AM   Commonly known as:  VFEND             STOP taking these medications     nitrofurantoin (macrocrystal-monohydrate) 100 MG capsule   Commonly known as:  MACROBID                Where to Get Your Medications      These medications were sent to [facility] [id number] [place] [address] [address] AT [facility], [place] - [phone] [facility] [phone]   [address], [facility] [address]     Phone:  [phone]     ADVAIR -21 MCG/ACT inhaler    predniSONE 10 MG tablet                  Your Medications      Your Medication List           Morning Noon Evening Bedtime As Needed    ADVAIR -21 MCG/ACT inhaler   Inhale 2 puffs 2 (Two) Times a Day.   Generic drug:  fluticasone-salmeterol                                 ALLERGY RELIEF 10 MG tablet   Take 10 mg by mouth Daily.   Generic drug:  loratadine                                atorvastatin 40 MG tablet   Take 40 mg by mouth Daily.   Commonly known as:  LIPITOR                                calcium carbonate 600 MG tablet   Take 600 mg by mouth 2 (Two) Times a Day With Meals.   Commonly known as:  OS-EVIN                                CARTIA  MG 24 hr capsule   Take  by mouth.   Generic drug:  diltiaZEM CD                                cholecalciferol 1000 UNITS tablet   Take 1,000 Units by mouth Daily.   Commonly known as:  VITAMIN D3                                dabigatran etexilate 150 MG capsu   Take 1 capsule by mouth Every 12 (Twelve) Hours.   Commonly known as:  PRADAXA                                * furosemide 40 MG tablet   TAKE 1 TABLET BY MOUTH DAILY   Commonly known as:  LASIX                                * furosemide 40 MG tablet   Take 1 tablet by mouth Daily.   Commonly known as:  LASIX                                Glucosamine-Chondroitin tablet   Take 1 tablet by mouth Daily.                                ipratropium-albuterol 0.5-2.5 mg/mL nebulizer   Take 3 mL by nebulization 4 (Four) Times a Day.   Commonly known as:  DUO-NEB                                losartan 50 MG tablet   50 mg 2 (Two) Times a Day.   Commonly known as:  COZAAR                                multivitamin tablet   Take 1 tablet by mouth Daily.                                predniSONE 10 MG tablet   Take 4 tabs daily x 3 days, then take 3 tabs daily x 3 days, then take 2 tabs daily x 3 days, then take 1 tab daily x 3 days   Commonly known as:  DELTASONE                                sulfamethoxazole-trimethoprim 800-160 MG per tablet   Take 1 tablet by mouth 2 (Two) Times a Day.   Commonly known as:  BACTRIM DS,SEPTRA DS                                SUPER B COMPLEX PO   Take  by mouth.                                VERAMYST 27.5  MCG/SPRAY nasal spray   1 spray into each nostril Daily.   Generic drug:  fluticasone                                voriconazole 200 MG tablet   Take 200 mg by mouth 2 (Two) Times a Day.   Commonly known as:  VFEND                                * Notice:  This list has 2 medication(s) that are the same as other medications prescribed for you. Read the directions carefully, and ask your doctor or other care provider to review them with you.             Instructions for After Discharge        Discharge References/Attachments     PREDNISONE TABLETS (ENGLISH)    PNEUMONIA, ADULT (ENGLISH)    INFLUENZA, ADULT (ENGLISH)       Follow-ups for After Discharge        Follow-up Information     Follow up with Rogelio Tucker MD Follow up in 2 week(s).    Specialties:  Pulmonary Disease, Sleep Medicine    Contact information:    [address]  [facility] [address] [phone]        Scheduled Appointments     2017  1:30 PM EST   CT anai chest wo contrast with ANAI UCE CT 1   [facility]    [address]  [facility] [address] [phone]           Bring current list of meds Please arrive 15 minutes prior to exam            Cali 10, 2017  7:30 AM EST   Cardioversion visit with ANAI PACU PROCEDURE ROOM 1   [facility] ([place])    [address] [facility] [address] [phone]              Fishbowl Signup     [facility] Fishbowl allows you to send messages to your doctor, view your test results, renew your prescriptions, schedule appointments, and more. To sign up, go to [employer] and click on the Sign Up Now link in the New User? box. Enter your Fishbowl Activation Code exactly as it appears below along with the last four digits of your Social Security Number and your Date of Birth () to complete the sign-up process. If you do not sign up before the expiration date, you must request a new code.    Fishbowl  Activation Code: [email or web address]  Expires: 1/12/2017  5:06 PM    If you have questions, you can email [email or web address] or call [phone] to talk to our MyChart staff. Remember, MyChart is NOT to be used for urgent needs. For medical emergencies, dial 911.           Summary of Your Hospitalization        Reason for Hospitalization     Your primary diagnosis was:  Not on File    Your diagnoses also included:  Pneumonia      Care Providers     Provider Service Role Specialty    Gerhard Camilo MD -- Attending Provider Pulmonary Disease      Your Allergies  Date Reviewed: 1/1/2017    Allergen Reactions    Erythromycin Not Noted         Levaquin (Levofloxacin) Not Noted         Macrobid (Nitrofurantoin) Not Noted      Pending Labs     Order Current Status    Blood Culture Preliminary result    Blood Culture Preliminary result      Patient Belongings Returned     Document Return of Belongings Flowsheet     Were the patient bedside belongings sent home?   Yes   Belongings Retrieved from Security & Sent Home   N/A    Belongings Sent to Safe   --   Medications Retrieved from Pharmacy & Sent Home   N/A              More Information      Prednisone tablets  What is this medicine?  PREDNISONE (PRED ni sone) is a corticosteroid. It is commonly used to treat inflammation of the skin, joints, lungs, and other organs. Common conditions treated include asthma, allergies, and arthritis. It is also used for other conditions, such as blood disorders and diseases of the adrenal glands.  This medicine may be used for other purposes; ask your health care provider or pharmacist if you have questions.  What should I tell my health care provider before I take this medicine?  They need to know if you have any of these conditions:  -Cushing's syndrome  -diabetes  -glaucoma  -heart disease  -high blood pressure  -infection (especially a virus infection such as chickenpox, cold sores, or herpes)  -kidney disease  -liver  disease  -mental illness  -myasthenia gravis  -osteoporosis  -seizures  -stomach or intestine problems  -thyroid disease  -an unusual or allergic reaction to lactose, prednisone, other medicines, foods, dyes, or preservatives  -pregnant or trying to get pregnant  -breast-feeding  How should I use this medicine?  Take this medicine by mouth with a glass of water. Follow the directions on the prescription label. Take this medicine with food. If you are taking this medicine once a day, take it in the morning. Do not take more medicine than you are told to take. Do not suddenly stop taking your medicine because you may develop a severe reaction. Your doctor will tell you how much medicine to take. If your doctor wants you to stop the medicine, the dose may be slowly lowered over time to avoid any side effects.  Talk to your pediatrician regarding the use of this medicine in children. Special care may be needed.  Overdosage: If you think you have taken too much of this medicine contact a poison control center or emergency room at once.  NOTE: This medicine is only for you. Do not share this medicine with others.  What if I miss a dose?  If you miss a dose, take it as soon as you can. If it is almost time for your next dose, talk to your doctor or health care professional. You may need to miss a dose or take an extra dose. Do not take double or extra doses without advice.  What may interact with this medicine?  Do not take this medicine with any of the following medications:  -metyrapone  -mifepristone  This medicine may also interact with the following medications:  -aminoglutethimide  -amphotericin B  -aspirin and aspirin-like medicines  -barbiturates  -certain medicines for diabetes, like glipizide or glyburide  -cholestyramine  -cholinesterase inhibitors  -cyclosporine  -digoxin  -diuretics  -ephedrine  -female hormones, like estrogens and birth control pills  -isoniazid  -ketoconazole  -NSAIDS, medicines for pain and  inflammation, like ibuprofen or naproxen  -phenytoin  -rifampin  -toxoids  -vaccines  -warfarin  This list may not describe all possible interactions. Give your health care provider a list of all the medicines, herbs, non-prescription drugs, or dietary supplements you use. Also tell them if you smoke, drink alcohol, or use illegal drugs. Some items may interact with your medicine.  What should I watch for while using this medicine?  Visit your doctor or health care professional for regular checks on your progress. If you are taking this medicine over a prolonged period, carry an identification card with your name and address, the type and dose of your medicine, and your doctor's name and address.  This medicine may increase your risk of getting an infection. Tell your doctor or health care professional if you are around anyone with measles or chickenpox, or if you develop sores or blisters that do not heal properly.  If you are going to have surgery, tell your doctor or health care professional that you have taken this medicine within the last twelve months.  Ask your doctor or health care professional about your diet. You may need to lower the amount of salt you eat.  This medicine may affect blood sugar levels. If you have diabetes, check with your doctor or health care professional before you change your diet or the dose of your diabetic medicine.  What side effects may I notice from receiving this medicine?  Side effects that you should report to your doctor or health care professional as soon as possible:  -allergic reactions like skin rash, itching or hives, swelling of the face, lips, or tongue  -changes in emotions or moods  -changes in vision  -depressed mood  -eye pain  -fever or chills, cough, sore throat, pain or difficulty passing urine  -increased thirst  -swelling of ankles, feet  Side effects that usually do not require medical attention (report to your doctor or health care professional if they  continue or are bothersome):  -confusion, excitement, restlessness  -headache  -nausea, vomiting  -skin problems, acne, thin and shiny skin  -trouble sleeping  -weight gain  This list may not describe all possible side effects. Call your doctor for medical advice about side effects. You may report side effects to FDA at 1-356-XFP-7805.  Where should I keep my medicine?  Keep out of the reach of children.  Store at room temperature between 15 and 30 degrees C (59 and 86 degrees F). Protect from light. Keep container tightly closed. Throw away any unused medicine after the expiration date.  NOTE: This sheet is a summary. It may not cover all possible information. If you have questions about this medicine, talk to your doctor, pharmacist, or health care provider.     © 2016, Elsevier/Gold Standard. (2012-08-02 10:57:14)          Community-Acquired Pneumonia, Adult  Pneumonia is an infection of the lungs. There are different types of pneumonia. One type can develop while a person is in a hospital. A different type, called community-acquired pneumonia, develops in people who are not, or have not recently been, in the hospital or other health care facility.   CAUSES  Pneumonia may be caused by bacteria, viruses, or funguses. Community-acquired pneumonia is often caused by Streptococcus pneumonia bacteria. These bacteria are often passed from one person to another by breathing in droplets from the cough or sneeze of an infected person.  RISK FACTORS  The condition is more likely to develop in:  · People who have chronic diseases, such as chronic obstructive pulmonary disease (COPD), asthma, congestive heart failure, cystic fibrosis, diabetes, or kidney disease.  · People who have early-stage or late-stage HIV.  · People who have sickle cell disease.  · People who have had their spleen removed (splenectomy).  · People who have poor dental hygiene.  · People who have medical conditions that increase the risk of breathing in  (aspirating) secretions their own mouth and nose.    · People who have a weakened immune system (immunocompromised).  · People who smoke.  · People who travel to areas where pneumonia-causing germs commonly exist.  · People who are around animal habitats or animals that have pneumonia-causing germs, including birds, bats, rabbits, cats, and farm animals.  SYMPTOMS  Symptoms of this condition include:  · A dry cough.  · A wet (productive) cough.  · Fever.  · Sweating.  · Chest pain, especially when breathing deeply or coughing.  · Rapid breathing or difficulty breathing.  · Shortness of breath.  · Shaking chills.  · Fatigue.  · Muscle aches.  DIAGNOSIS  Your health care provider will take a medical history and perform a physical exam. You may also have other tests, including:  · Imaging studies of your chest, including X-rays.  · Tests to check your blood oxygen level and other blood gases.  · Other tests on blood, mucus (sputum), fluid around your lungs (pleural fluid), and urine.  If your pneumonia is severe, other tests may be done to identify the specific cause of your illness.  TREATMENT  The type of treatment that you receive depends on many factors, such as the cause of your pneumonia, the medicines you take, and other medical conditions that you have. For most adults, treatment and recovery from pneumonia may occur at home. In some cases, treatment must happen in a hospital. Treatment may include:  · Antibiotic medicines, if the pneumonia was caused by bacteria.  · Antiviral medicines, if the pneumonia was caused by a virus.  · Medicines that are given by mouth or through an IV tube.  · Oxygen.  · Respiratory therapy.  Although rare, treating severe pneumonia may include:  · Mechanical ventilation. This is done if you are not breathing well on your own and you cannot maintain a safe blood oxygen level.  · Thoracentesis. This procedure removes fluid around one lung or both lungs to help you breathe  better.  HOME CARE INSTRUCTIONS  · Take over-the-counter and prescription medicines only as told by your health care provider.    Only take cough medicine if you are losing sleep. Understand that cough medicine can prevent your body's natural ability to remove mucus from your lungs.    If you were prescribed an antibiotic medicine, take it as told by your health care provider. Do not stop taking the antibiotic even if you start to feel better.  · Sleep in a semi-upright position at night. Try sleeping in a reclining chair, or place a few pillows under your head.  · Do not use tobacco products, including cigarettes, chewing tobacco, and e-cigarettes. If you need help quitting, ask your health care provider.  · Drink enough water to keep your urine clear or pale yellow. This will help to thin out mucus secretions in your lungs.  PREVENTION  There are ways that you can decrease your risk of developing community-acquired pneumonia. Consider getting a pneumococcal vaccine if:  · You are older than 65 years of age.  · You are older than 19 years of age and are undergoing cancer treatment, have chronic lung disease, or have other medical conditions that affect your immune system. Ask your health care provider if this applies to you.  There are different types and schedules of pneumococcal vaccines. Ask your health care provider which vaccination option is best for you.  You may also prevent community-acquired pneumonia if you take these actions:  · Get an influenza vaccine every year. Ask your health care provider which type of influenza vaccine is best for you.  · Go to the dentist on a regular basis.  · Wash your hands often. Use hand  if soap and water are not available.  SEEK MEDICAL CARE IF:  · You have a fever.  · You are losing sleep because you cannot control your cough with cough medicine.  SEEK IMMEDIATE MEDICAL CARE IF:  · You have worsening shortness of breath.  · You have increased chest pain.  · Your  "sickness becomes worse, especially if you are an older adult or have a weakened immune system.  · You cough up blood.     This information is not intended to replace advice given to you by your health care provider. Make sure you discuss any questions you have with your health care provider.     Document Released: 12/18/2006 Document Revised: 09/07/2016 Document Reviewed: 04/13/2016  [employer] Interactive Patient Education ©2016 Elsevier Inc.          Influenza, Adult  Influenza (\"the flu\") is a viral infection of the respiratory tract. It occurs more often in winter months because people spend more time in close contact with one another. Influenza can make you feel very sick. Influenza easily spreads from person to person (contagious).  CAUSES   Influenza is caused by a virus that infects the respiratory tract. You can catch the virus by breathing in droplets from an infected person's cough or sneeze. You can also catch the virus by touching something that was recently contaminated with the virus and then touching your mouth, nose, or eyes.  RISKS AND COMPLICATIONS  You may be at risk for a more severe case of influenza if you smoke cigarettes, have diabetes, have chronic heart disease (such as heart failure) or lung disease (such as asthma), or if you have a weakened immune system. Elderly people and pregnant women are also at risk for more serious infections. The most common problem of influenza is a lung infection (pneumonia). Sometimes, this problem can require emergency medical care and may be life threatening.  SIGNS AND SYMPTOMS   Symptoms typically last 4 to 10 days and may include:  · Fever.  · Chills.  · Headache, body aches, and muscle aches.  · Sore throat.  · Chest discomfort and cough.  · Poor appetite.  · Weakness or feeling tired.  · Dizziness.  · Nausea or vomiting.  DIAGNOSIS   Diagnosis of influenza is often made based on your history and a physical exam. A nose or throat swab test can be done to " confirm the diagnosis.  TREATMENT   In mild cases, influenza goes away on its own. Treatment is directed at relieving symptoms. For more severe cases, your health care provider may prescribe antiviral medicines to shorten the sickness. Antibiotic medicines are not effective because the infection is caused by a virus, not by bacteria.  HOME CARE INSTRUCTIONS  · Take medicines only as directed by your health care provider.  · Use a cool mist humidifier to make breathing easier.  · Get plenty of rest until your temperature returns to normal. This usually takes 3 to 4 days.  · Drink enough fluid to keep your urine clear or pale yellow.  · Cover your mouth and nose when coughing or sneezing, and wash your hands well to prevent the virus from spreading.  · Stay home from work or school until the fever is gone for at least 1 full day.  PREVENTION   An annual influenza vaccination (flu shot) is the best way to avoid getting influenza. An annual flu shot is now routinely recommended for all adults in the [place]  SEEK MEDICAL CARE IF:  · You experience chest pain, your cough worsens, or you produce more mucus.  · You have nausea, vomiting, or diarrhea.  · Your fever returns or gets worse.  SEEK IMMEDIATE MEDICAL CARE IF:  · You have trouble breathing, you become short of breath, or your skin or nails become bluish.  · You have severe pain or stiffness in the neck.  · You develop a sudden headache, or pain in the face or ear.  · You have nausea or vomiting that you cannot control.  MAKE SURE YOU:   · Understand these instructions.  · Will watch your condition.  · Will get help right away if you are not doing well or get worse.     This information is not intended to replace advice given to you by your health care provider. Make sure you discuss any questions you have with your health care provider.     Document Released: 12/15/2001 Document Revised: 01/08/2016 Document Reviewed: 03/18/2013  [email or web address] Patient Education  ©2016 Elsevier Inc.            SYMPTOMS OF A STROKE    Call 911 or have someone take you to the Emergency Department if you have any of the following:    · Sudden numbness or weakness of your face, arm or leg especially on one side of the body  · Sudden confusion, diffiiculty speaking or trouble understanding   · Changes in your vision or loss of sight in one eye  · Sudden severe headache with no known cause  · sudden dizziness, trouble walking, loss of balance or coordination    It is important to seek emergency care right away if you have further stroke symptoms. If you get emergency help quickly, the powerful clot-dissolving medicines can reduce the disabilities caused by a stroke.     For more information:    American Stroke Association  5-992-5-STROKE  www.strokeassociation.org           IF YOU SMOKE OR USE TOBACCO PLEASE READ THE FOLLOWING:    Why is smoking bad for me?  Smoking increases the risk of heart disease, lung disease, vascular disease, stroke, and cancer.     If you smoke, STOP!    If you would like more information on quitting smoking, please visit the [employer] website: [email or web address]   This link will provide additional resources including the QUIT line and the Beat the Pack support groups.     For more information:    American Cancer Society  (652) 635-3039    American Heart Association  1-191.164.4011               YOU ARE THE MOST IMPORTANT FACTOR IN YOUR RECOVERY.     Follow all instructions carefully.     I have reviewed my discharge instructions with my nurse, including the following information, if applicable:     Information about my illness and diagnosis   Follow up appointments (including lab draws)   Wound Care   Equipment Needs   Medications (new and continuing) along with side effects   Preventative information such as vaccines and smoking cessations   Diet   Pain   I know when to contact my Doctor's office or seek emergency  care      I want my nurse to describe the side effects of my medications: YES NO   If the answer is no, I understand the side effects of my medications: YES NO   My nurse described the side effects of my medications in a way that I could understand: YES NO   I have taken my personal belongings and my own medications with me at discharge: YES NO            I have received this information and my questions have been answered. I have discussed any concerns I see with this plan with the nurse or physician. I understand these instructions.    Signature of Patient or Responsible Person: _____________________________________    Date: _________________  Time: __________________    Signature of Healthcare Provider: _______________________________________  Date: _________________  Time: __________________

## 2017-01-02 LAB
ANION GAP SERPL CALCULATED.3IONS-SCNC: 13.6 MMOL/L
BUN BLD-MCNC: 8 MG/DL (ref 8–23)
BUN/CREAT SERPL: 17.8 (ref 7–25)
CALCIUM SPEC-SCNC: 8.5 MG/DL (ref 8.6–10.5)
CHLORIDE SERPL-SCNC: 92 MMOL/L (ref 98–107)
CO2 SERPL-SCNC: 23.4 MMOL/L (ref 22–29)
CREAT BLD-MCNC: 0.45 MG/DL (ref 0.57–1)
DEPRECATED RDW RBC AUTO: 54.6 FL (ref 37–54)
ERYTHROCYTE [DISTWIDTH] IN BLOOD BY AUTOMATED COUNT: 15.1 % (ref 11.7–13)
GFR SERPL CREATININE-BSD FRML MDRD: 132 ML/MIN/1.73
GLUCOSE BLD-MCNC: 179 MG/DL (ref 65–99)
HCT VFR BLD AUTO: 30.3 % (ref 35.6–45.5)
HGB BLD-MCNC: 9.9 G/DL (ref 11.9–15.5)
MCH RBC QN AUTO: 32.2 PG (ref 26.9–32)
MCHC RBC AUTO-ENTMCNC: 32.7 G/DL (ref 32.4–36.3)
MCV RBC AUTO: 98.7 FL (ref 80.5–98.2)
PLATELET # BLD AUTO: 182 10*3/MM3 (ref 140–500)
PMV BLD AUTO: 9.3 FL (ref 6–12)
POTASSIUM BLD-SCNC: 4 MMOL/L (ref 3.5–5.2)
RBC # BLD AUTO: 3.07 10*6/MM3 (ref 3.9–5.2)
SODIUM BLD-SCNC: 129 MMOL/L (ref 136–145)
WBC NRBC COR # BLD: 1.4 10*3/MM3 (ref 4.5–10.7)

## 2017-01-02 PROCEDURE — 94799 UNLISTED PULMONARY SVC/PX: CPT

## 2017-01-02 PROCEDURE — 63710000001 PREDNISONE PER 5 MG: Performed by: INTERNAL MEDICINE

## 2017-01-02 PROCEDURE — 25010000002 METHYLPREDNISOLONE PER 125 MG: Performed by: INTERNAL MEDICINE

## 2017-01-02 PROCEDURE — 94640 AIRWAY INHALATION TREATMENT: CPT

## 2017-01-02 PROCEDURE — 85027 COMPLETE CBC AUTOMATED: CPT | Performed by: INTERNAL MEDICINE

## 2017-01-02 PROCEDURE — 80048 BASIC METABOLIC PNL TOTAL CA: CPT | Performed by: INTERNAL MEDICINE

## 2017-01-02 RX ORDER — IPRATROPIUM BROMIDE AND ALBUTEROL SULFATE 2.5; .5 MG/3ML; MG/3ML
3 SOLUTION RESPIRATORY (INHALATION)
Status: DISCONTINUED | OUTPATIENT
Start: 2017-01-02 | End: 2017-01-03 | Stop reason: HOSPADM

## 2017-01-02 RX ORDER — FAMOTIDINE 20 MG/1
20 TABLET, FILM COATED ORAL DAILY
Status: DISCONTINUED | OUTPATIENT
Start: 2017-01-02 | End: 2017-01-03 | Stop reason: HOSPADM

## 2017-01-02 RX ORDER — DOXYCYCLINE 100 MG/1
100 CAPSULE ORAL EVERY 12 HOURS SCHEDULED
Status: DISCONTINUED | OUTPATIENT
Start: 2017-01-02 | End: 2017-01-03 | Stop reason: HOSPADM

## 2017-01-02 RX ADMIN — DOXYCYCLINE 100 MG: 100 CAPSULE ORAL at 20:19

## 2017-01-02 RX ADMIN — IPRATROPIUM BROMIDE AND ALBUTEROL SULFATE 3 ML: .5; 3 SOLUTION RESPIRATORY (INHALATION) at 16:44

## 2017-01-02 RX ADMIN — FAMOTIDINE 20 MG: 20 TABLET, FILM COATED ORAL at 13:36

## 2017-01-02 RX ADMIN — DABIGATRAN ETEXILATE MESYLATE 150 MG: 150 CAPSULE ORAL at 09:16

## 2017-01-02 RX ADMIN — ATORVASTATIN CALCIUM 40 MG: 40 TABLET, FILM COATED ORAL at 20:19

## 2017-01-02 RX ADMIN — IPRATROPIUM BROMIDE AND ALBUTEROL SULFATE 3 ML: .5; 3 SOLUTION RESPIRATORY (INHALATION) at 07:28

## 2017-01-02 RX ADMIN — VORICONAZOLE 200 MG: 200 TABLET, FILM COATED ORAL at 07:06

## 2017-01-02 RX ADMIN — DOXYCYCLINE 100 MG: 100 INJECTION, POWDER, LYOPHILIZED, FOR SOLUTION INTRAVENOUS at 04:07

## 2017-01-02 RX ADMIN — PREDNISONE 30 MG: 10 TABLET ORAL at 20:19

## 2017-01-02 RX ADMIN — VORICONAZOLE 200 MG: 200 TABLET, FILM COATED ORAL at 18:43

## 2017-01-02 RX ADMIN — IPRATROPIUM BROMIDE AND ALBUTEROL SULFATE 3 ML: .5; 3 SOLUTION RESPIRATORY (INHALATION) at 21:48

## 2017-01-02 RX ADMIN — DABIGATRAN ETEXILATE MESYLATE 150 MG: 150 CAPSULE ORAL at 20:19

## 2017-01-02 RX ADMIN — METHYLPREDNISOLONE SODIUM SUCCINATE 60 MG: 125 INJECTION, POWDER, FOR SOLUTION INTRAMUSCULAR; INTRAVENOUS at 09:16

## 2017-01-02 RX ADMIN — METHYLPREDNISOLONE SODIUM SUCCINATE 60 MG: 125 INJECTION, POWDER, FOR SOLUTION INTRAMUSCULAR; INTRAVENOUS at 04:07

## 2017-01-02 RX ADMIN — IPRATROPIUM BROMIDE AND ALBUTEROL SULFATE 3 ML: .5; 3 SOLUTION RESPIRATORY (INHALATION) at 03:23

## 2017-01-02 RX ADMIN — DILTIAZEM HYDROCHLORIDE 180 MG: 180 CAPSULE, COATED, EXTENDED RELEASE ORAL at 09:16

## 2017-01-02 RX ADMIN — FUROSEMIDE 40 MG: 40 TABLET ORAL at 09:16

## 2017-01-02 RX ADMIN — IPRATROPIUM BROMIDE AND ALBUTEROL SULFATE 3 ML: .5; 3 SOLUTION RESPIRATORY (INHALATION) at 12:15

## 2017-01-02 NOTE — PLAN OF CARE
Problem: Patient Care Overview (Adult)  Goal: Plan of Care Review  Outcome: Ongoing (interventions implemented as appropriate)    01/02/17 6499   Coping/Psychosocial Response Interventions   Plan Of Care Reviewed With patient   Patient Care Overview   Progress improving   Outcome Evaluation   Outcome Summary/Follow up Plan no resp distress; po antibiotics orders       Goal: Adult Individualization and Mutuality  Outcome: Ongoing (interventions implemented as appropriate)  Goal: Discharge Needs Assessment  Outcome: Ongoing (interventions implemented as appropriate)    Problem: Pneumonia (Adult)  Goal: Signs and Symptoms of Listed Potential Problems Will be Absent or Manageable (Pneumonia)  Outcome: Ongoing (interventions implemented as appropriate)    Problem: Fall Risk (Adult)  Goal: Absence of Falls  Outcome: Ongoing (interventions implemented as appropriate)

## 2017-01-02 NOTE — PROGRESS NOTES
[facility] INPATIENT PROGRESS NOTE         [address]    1/2/2017      PATIENT IDENTIFICATION:   Name:  Agnieszka Rizzo      MRN:  [id number]     86 y.o.  female             LOS 1    Reason for visit:  F/U resp failure with Influeza     Subjective   SUBJECTIVE:     less soa.  Less cough.  No cp, n/v    Objective   OBJECTIVE:  Vitals:    01/02/17 0728 01/02/17 0805 01/02/17 1215 01/02/17 1300   BP:  124/78     BP Location:  Right arm     Patient Position:  Sitting     Pulse: 94 103 101    Resp: 18 22 20    Temp:  97.5 °F (36.4 °C)     TempSrc:  Oral     SpO2: 99% 94% 94% 96%   Weight:       Height:                             Body mass index is 22.23 kg/(m^2).    Intake/Output Summary (Last 24 hours) at 01/02/17 1426  Last data filed at 01/02/17 0250   Gross per 24 hour   Intake      0 ml   Output    500 ml   Net   -500 ml         Exam:  GEN:  No distress, appears stated age  EYES:   PERRL, anicteric sclera  ENT:    External ears/nose normal, OP clear  NECK:  No adenopathy, midline trachea  LUNGS: Normal chest on inspection, palpation and diminished on auscultation  CV:  Normal S1S2, without murmur  ABD:  Non tender, non distended, no hepatosplenomegaly, +BS  EXT:  No edema, cyanosis or clubbing    Scheduled meds:    atorvastatin 40 mg Oral Daily   dabigatran etexilate 150 mg Oral Q12H   diltiaZEM  mg Oral Q24H   doxycycline 100 mg Intravenous Q12H   famotidine 20 mg Oral Daily   furosemide 40 mg Oral Daily   ipratropium-albuterol 3 mL Nebulization Q4H - RT   methylPREDNISolone sodium succinate 60 mg Intravenous Q6H   voriconazole 200 mg Oral BID     IV meds:                         Data Review:    Results from last 7 days  Lab Units 01/02/17  0634 01/01/17  1135 12/29/16  1025   SODIUM mmol/L 129* 127* 133*   POTASSIUM mmol/L 4.0 4.1 4.4   CHLORIDE mmol/L 92* 87* 95*   TOTAL CO2 mmol/L 23.4 26.1 26.1   BUN mg/dL 8 11 9   CREATININE mg/dL 0.45* 0.67 0.57   GLUCOSE mg/dL 179* 110* 109*   CALCIUM  mg/dL 8.5* 8.8 9.3         Estimated Creatinine Clearance: 41.8 mL/min (by C-G formula based on Cr of 0.45).    Results from last 7 days  Lab Units 01/02/17  0745 01/01/17  1135   WBC 10*3/mm3 1.40* 5.68   HEMOGLOBIN g/dL 9.9* 10.5*   PLATELETS 10*3/mm3 182 195           Results from last 7 days  Lab Units 01/01/17  1135   ALT (SGPT) U/L 14   AST (SGOT) U/L 25       Micro + influenze          Assessment   ASSESSMENT:   Influenza  Acute bronchospasm/asthma exacerbation  Aspergillus pulmonary infection  Chronic A-fib    PLAN:  Too late for Tamiflu benefit.  Wheeze improved with bronchodilators.  Will ambulate and see if oxygen at home is needed.  Likely d/c tomorrow.  Change steroid to po    Gerhard Camilo MD  Pulmonary and Critical Care Medicine  [facility], [facility]  1/2/2017    2:26 PM

## 2017-01-02 NOTE — PLAN OF CARE
Problem: Patient Care Overview (Adult)  Goal: Plan of Care Review  Outcome: Ongoing (interventions implemented as appropriate)    01/02/17 0420   Coping/Psychosocial Response Interventions   Plan Of Care Reviewed With patient   Patient Care Overview   Progress no change   Outcome Evaluation   Outcome Summary/Follow up Plan Patient postive for Influenza AH3. Droplet isolation commenced. No Tamiflu per M.D since more than 48hours sick. Continues IV solumedrol and antibiotics given as per order. Cough wet but non-productive. Still needs specimen for respiratory culture. SOA on exertion. Nursing will continue to monitor ..        Goal: Adult Individualization and Mutuality  Outcome: Ongoing (interventions implemented as appropriate)  Goal: Discharge Needs Assessment  Outcome: Ongoing (interventions implemented as appropriate)    Problem: Pneumonia (Adult)  Goal: Signs and Symptoms of Listed Potential Problems Will be Absent or Manageable (Pneumonia)  Outcome: Ongoing (interventions implemented as appropriate)    Problem: Fall Risk (Adult)  Goal: Identify Related Risk Factors and Signs and Symptoms  Outcome: Outcome(s) achieved Date Met:  01/02/17  Goal: Absence of Falls  Outcome: Ongoing (interventions implemented as appropriate)

## 2017-01-03 VITALS
DIASTOLIC BLOOD PRESSURE: 77 MMHG | TEMPERATURE: 98.4 F | HEART RATE: 96 BPM | BODY MASS INDEX: 22.24 KG/M2 | WEIGHT: 125.5 LBS | OXYGEN SATURATION: 95 % | RESPIRATION RATE: 20 BRPM | SYSTOLIC BLOOD PRESSURE: 127 MMHG | HEIGHT: 63 IN

## 2017-01-03 PROCEDURE — 63710000001 PREDNISONE PER 5 MG: Performed by: INTERNAL MEDICINE

## 2017-01-03 PROCEDURE — 94799 UNLISTED PULMONARY SVC/PX: CPT

## 2017-01-03 PROCEDURE — 94640 AIRWAY INHALATION TREATMENT: CPT

## 2017-01-03 RX ORDER — PREDNISONE 10 MG/1
TABLET ORAL
Qty: 31 TABLET | Refills: 0 | Status: SHIPPED | OUTPATIENT
Start: 2017-01-03 | End: 2017-01-10 | Stop reason: HOSPADM

## 2017-01-03 RX ORDER — FLUTICASONE PROPIONATE AND SALMETEROL XINAFOATE 230; 21 UG/1; UG/1
2 AEROSOL, METERED RESPIRATORY (INHALATION)
Qty: 1 INHALER | Refills: 0 | Status: SHIPPED | OUTPATIENT
Start: 2017-01-03 | End: 2018-12-26

## 2017-01-03 RX ADMIN — FUROSEMIDE 40 MG: 40 TABLET ORAL at 09:43

## 2017-01-03 RX ADMIN — IPRATROPIUM BROMIDE AND ALBUTEROL SULFATE 3 ML: .5; 3 SOLUTION RESPIRATORY (INHALATION) at 01:44

## 2017-01-03 RX ADMIN — FAMOTIDINE 20 MG: 20 TABLET, FILM COATED ORAL at 09:43

## 2017-01-03 RX ADMIN — DILTIAZEM HYDROCHLORIDE 180 MG: 180 CAPSULE, COATED, EXTENDED RELEASE ORAL at 09:41

## 2017-01-03 RX ADMIN — IPRATROPIUM BROMIDE AND ALBUTEROL SULFATE 3 ML: .5; 3 SOLUTION RESPIRATORY (INHALATION) at 07:22

## 2017-01-03 RX ADMIN — IPRATROPIUM BROMIDE AND ALBUTEROL SULFATE 3 ML: .5; 3 SOLUTION RESPIRATORY (INHALATION) at 16:03

## 2017-01-03 RX ADMIN — PREDNISONE 30 MG: 10 TABLET ORAL at 09:45

## 2017-01-03 RX ADMIN — DABIGATRAN ETEXILATE MESYLATE 150 MG: 150 CAPSULE ORAL at 08:37

## 2017-01-03 RX ADMIN — DOXYCYCLINE 100 MG: 100 CAPSULE ORAL at 09:42

## 2017-01-03 RX ADMIN — IPRATROPIUM BROMIDE AND ALBUTEROL SULFATE 3 ML: .5; 3 SOLUTION RESPIRATORY (INHALATION) at 11:57

## 2017-01-03 RX ADMIN — VORICONAZOLE 200 MG: 200 TABLET, FILM COATED ORAL at 08:37

## 2017-01-03 NOTE — PLAN OF CARE
Problem: Patient Care Overview (Adult)  Goal: Plan of Care Review  Outcome: Ongoing (interventions implemented as appropriate)    01/03/17 0624   Coping/Psychosocial Response Interventions   Plan Of Care Reviewed With patient   Patient Care Overview   Progress no change   Outcome Evaluation   Outcome Summary/Follow up Plan VSS; no respiratory distress; c/o chest pain w/o radiation to arms, pt. believes it is due to coughing; pain medication offered but refused.         Problem: Pneumonia (Adult)  Goal: Signs and Symptoms of Listed Potential Problems Will be Absent or Manageable (Pneumonia)  Outcome: Ongoing (interventions implemented as appropriate)    01/03/17 0624   Pneumonia   Problems Assessed (Pneumonia) all   Problems Present (Pneumonia) fluid/electrolyte imbalance         Problem: Fall Risk (Adult)  Goal: Absence of Falls  Outcome: Ongoing (interventions implemented as appropriate)    01/03/17 0624   Fall Risk (Adult)   Absence of Falls achieves outcome

## 2017-01-03 NOTE — PROGRESS NOTES
Discharge Planning Assessment  [facility]     Patient Name: Agnieszka Rizzo  MRN: [id number]  Today's Date: 1/3/2017    Admit Date: 1/1/2017          Discharge Needs Assessment       01/03/17 1049    Living Environment    Lives With alone    Living Arrangements house    Provides Primary Care For no one, unable/limited ability to care for self    Quality Of Family Relationships supportive    Able to Return to Prior Living Arrangements yes    Living Arrangement Comments pt lives alone in a SS house, has 2 entry steps, drives, children avail. to help if needed.     Discharge Needs Assessment    Concerns To Be Addressed no discharge needs identified    Readmission Within The Last 30 Days no previous admission in last 30 days    Anticipated Changes Related to Illness none    Equipment Currently Used at Home none    Equipment Needed After Discharge none    Transportation Available car;family or friend will provide    Discharge Disposition home or self-care;still a patient    Discharge Planning Comments verified facesheet, introduces self and role of d/c planner.  Pt lives alone, is very IADL'S, no dme's, was here in NOV and sent home with O2 but states all the equipment was returned since she was weaned.  Pt has done HH following partial knee 2 years ago and has done [facility] outpt therapy in [place].  Pt denies any d/c needs, plans home. Pt requested list of [facility] Md's, she would like to find Md that is in [facility], list provided and reviewed with pt.Pt is hoping to get up with staff after her bath today, she is anxious for home and returning back to the gym. Will follow for any needs.              Discharge Plan       01/03/17 1101    Case Management/Social Work Plan    Plan home, lives alone    Patient/Family In Agreement With Plan yes    Additional Comments plans are home, no needs identified, requested list of [facility] MD's, wants to find one in network, will assist with appt as needed.         Discharge Placement      No information found        Expected Discharge Date and Time     Expected Discharge Date Expected Discharge Time    Jan 5, 2017               Demographic Summary       01/03/17 1037    Referral Information    Admission Type inpatient    Arrived From admitted as an inpatient    Referral Source admission list    Reason For Consult discharge planning    Record Reviewed medical record    Contact Information    Permission Granted to Share Information With family/designee    Comments Michelle Cooney #[phone]            Functional Status       01/03/17 1043    Functional Status Current    Ambulation 2-->assistive person    Transferring 2-->assistive person    Toileting 1-->assistive equipment    Bathing 0-->independent    Dressing 2-->assistive person    Eating 0-->independent    Communication 0-->understands/communicates without difficulty    Swallowing (if score 2 or more for any item, consult Rehab Services) 0-->swallows foods/liquids without difficulty    Current Functional Level Comment needs some assistance, has not felt like moving very much due to medical diagnosis.     Change in Functional Status Since Onset of Current Illness/Injury no    Functional Status Prior    Ambulation 0-->independent    Transferring 0-->independent    Toileting 0-->independent    Bathing 0-->independent    Dressing 0-->independent    Eating 0-->independent    Communication 0-->understands/communicates without difficulty    Swallowing 0-->swallows foods/liquids without difficulty    Prior Functional Level Comment Pt is up with assist here due to the flu, normally very IADL'S, goes to the gym, drives self, lives alone and manages all her needs.     IADL    Medications independent    Meal Preparation independent    Housekeeping independent    Laundry independent    Shopping independent    Oral Care independent    IADL Comments IADL'S, no deficits.     Activity Tolerance    Current Activity Limitations other (see comments)   some  weakness due to illness.     Usual Activity Tolerance good    Current Activity Tolerance moderate    Cognitive/Perceptual/Developmental    Current Mental Status/Cognitive Functioning no deficits noted    Employment/Financial    Financial Concerns none            Psychosocial     None            Abuse/Neglect     None            Legal     None            Substance Abuse     None            Patient Forms     None          Charline Carrera RN

## 2017-01-03 NOTE — DISCHARGE SUMMARY
PHYSICIAN DISCHARGE SUMMARY                                                                        [facility]    Date of admit: 1/1/2017  Date of Discharge:  1/3/2017    PCP: Gabe Horne MD    Discharge Diagnosis:   Active Problems:    Pneumonia  Influenza  Acute bronchospasm/asthma exacerbation  Aspergillus pulmonary infection  Chronic A-fib    Secondary Diagnoses:  Past Medical History   Diagnosis Date   • A-fib    • Abdominal distension    • Acute hypoxemic respiratory failure    • Asthma    • Atrial fibrillation    • Bigeminy    • Bronchiectasis    • Chest tightness    • Colitis    • Colitis    • Cough    • Dyspnea    • History of pneumonia      MAY 2016   • Hyperlipidemia    • Hypertension    • Hypoxia    • Infectious viral hepatitis      AGE 13   • Lesion of lung    • SOB (shortness of breath)    • Wheeze      mild       Procedures Performed           Consults:       Hospital Course  Patient is a 86 y.o. female presented with per H&P    Chief Complaint: Cough and soa     History of Present Illness:   87 yo sees Dr Tucker with recent Dx Aspirgillus presents with c/o soa and cough. Cough moderate in nature and mostly nonproductive. No CP. SOA worse with activity and has associated wheeze. Has worsened in last few days. Has been on Vfend. CT shows signs of atypical, possibly viral PNA. Received steroid and nebs in ER and request to admit.     Admitted with:  Assessment:  Atypical PNA  SOA  Acute bronchospasm/asthma exacerbation  Aspergillus pulmonary infection  Chronic A-fib    Found to be positive for Influenze.  Had been having symptoms too long for benefit from Tamiflu.  Bronchospasm improved with steroid and bronchodilator.  Weaned off oxygen.  Plan for d/c with f/u with Dr Tucker in office.    Condition on Discharge:  Stable.      Vital Signs  Temp:  [97.7 °F (36.5 °C)-97.8 °F (36.6 °C)] 97.7 °F (36.5 °C)  Heart Rate:   [] 99  Resp:  [16-18] 18  BP: (117-134)/(68-77) 134/68    Physical Exam:  General Appearance: no acute distress, appears comfortable  Heart: regular rate and rhythm  Lungs: clear to auscultation bilaterally, respirations unlabored  Abdomen: soft, non-tender, non-distended, no guarding/rebound, nl BS  Extremeties: no edema, no cyanosis  Neurology: speech normal, mental status normal, moving all four extremeties  Mental Status: alert, oriented    Discharge Disposition  Home or Self Care    Discharge Medications   Agnieszka Rizzo   Home Medication Instructions SILVIA:[id number]    Printed on:01/03/17 6242   Medication Information                      ADVAIR -21 MCG/ACT inhaler  Inhale 2 puffs 2 (Two) Times a Day.             atorvastatin (LIPITOR) 40 MG tablet  Take 40 mg by mouth Daily.             B Complex-C (SUPER B COMPLEX PO)  Take  by mouth.             calcium carbonate (OS-EVIN) 600 MG tablet  Take 600 mg by mouth 2 (Two) Times a Day With Meals.             cholecalciferol (VITAMIN D3) 1000 UNITS tablet  Take 1,000 Units by mouth Daily.             dabigatran etexilate (PRADAXA) 150 MG capsu  Take 1 capsule by mouth Every 12 (Twelve) Hours.             diltiazem CD (CARTIA XT) 180 MG 24 hr capsule  Take  by mouth.             furosemide (LASIX) 40 MG tablet  TAKE 1 TABLET BY MOUTH DAILY             furosemide (LASIX) 40 MG tablet  Take 1 tablet by mouth Daily.             Glucosamine-Chondroit-Vit C-Mn (GLUCOSAMINE-CHONDROITIN) tablet  Take 1 tablet by mouth Daily.             ipratropium-albuterol (DUO-NEB) 0.5-2.5 mg/mL nebulizer  Take 3 mL by nebulization 4 (Four) Times a Day.             loratadine (ALLERGY RELIEF) 10 MG tablet  Take 10 mg by mouth Daily.             losartan (COZAAR) 50 MG tablet  50 mg 2 (Two) Times a Day.             Multiple Vitamin (MULTIVITAMIN) tablet  Take 1 tablet by mouth Daily.             predniSONE (DELTASONE) 10 MG tablet  Take 4 tabs daily x 3 days, then take 3  tabs daily x 3 days, then take 2 tabs daily x 3 days, then take 1 tab daily x 3 days             sulfamethoxazole-trimethoprim (BACTRIM DS,SEPTRA DS) 800-160 MG per tablet  Take 1 tablet by mouth 2 (Two) Times a Day.             VERAMYST 27.5 MCG/SPRAY nasal spray  1 spray into each nostril Daily.             voriconazole (VFEND) 200 MG tablet  Take 200 mg by mouth 2 (Two) Times a Day.                 Discharge Diet: regular diet    Activity at Discharge:  ad mira      See primary care provider, Gabe Horne MD, in 1-2 weeks    follow up with Dr Tucker in office in 2 weeks. Call for appointment.    Test Results Pending at Discharge   Order Current Status    Blood Culture Preliminary result    Blood Culture Preliminary result           Gerhard Camilo MD  [facility], [facility]  Pulmonary and Critical Care Medicine  01/03/17  2:39 PM    Time: greater than 30 minutes.        Total time spent discharging patient including evaluation,post hospitalization follow up,  medication and post hospitalization instructions and education total time exceeds 30 minutes.

## 2017-01-05 RX ORDER — DILTIAZEM HYDROCHLORIDE 180 MG/1
180 CAPSULE, COATED, EXTENDED RELEASE ORAL DAILY
Qty: 90 CAPSULE | Refills: 3 | Status: SHIPPED | OUTPATIENT
Start: 2017-01-05 | End: 2017-02-28

## 2017-01-05 RX ORDER — DABIGATRAN ETEXILATE 150 MG/1
150 CAPSULE ORAL EVERY 12 HOURS SCHEDULED
Qty: 180 CAPSULE | Refills: 3 | Status: SHIPPED | OUTPATIENT
Start: 2017-01-05 | End: 2017-07-13

## 2017-01-06 ENCOUNTER — APPOINTMENT (OUTPATIENT)
Dept: CT IMAGING | Facility: HOSPITAL | Age: 82
End: 2017-01-06
Attending: INTERNAL MEDICINE

## 2017-01-06 ENCOUNTER — APPOINTMENT (OUTPATIENT)
Dept: GENERAL RADIOLOGY | Facility: HOSPITAL | Age: 82
End: 2017-01-06

## 2017-01-06 ENCOUNTER — HOSPITAL ENCOUNTER (INPATIENT)
Facility: HOSPITAL | Age: 82
LOS: 4 days | Discharge: HOME HEALTH | End: 2017-01-10
Attending: EMERGENCY MEDICINE | Admitting: INTERNAL MEDICINE

## 2017-01-06 DIAGNOSIS — B44.9: Primary | ICD-10-CM

## 2017-01-06 DIAGNOSIS — I50.33 HEART FAILURE, DIASTOLIC, WITH ACUTE DECOMPENSATION (HCC): ICD-10-CM

## 2017-01-06 DIAGNOSIS — J96.01 ACUTE RESPIRATORY FAILURE WITH HYPOXIA (HCC): ICD-10-CM

## 2017-01-06 LAB
ALBUMIN SERPL-MCNC: 3.9 G/DL (ref 3.5–5.2)
ALBUMIN/GLOB SERPL: 1.4 G/DL
ALP SERPL-CCNC: 90 U/L (ref 39–117)
ALT SERPL W P-5'-P-CCNC: 16 U/L (ref 1–33)
ANION GAP SERPL CALCULATED.3IONS-SCNC: 10.5 MMOL/L
AST SERPL-CCNC: 21 U/L (ref 1–32)
BACTERIA SPEC AEROBE CULT: NORMAL
BACTERIA SPEC AEROBE CULT: NORMAL
BASOPHILS # BLD AUTO: 0 10*3/MM3 (ref 0–0.2)
BASOPHILS NFR BLD AUTO: 0 % (ref 0–1.5)
BILIRUB SERPL-MCNC: 0.4 MG/DL (ref 0.1–1.2)
BUN BLD-MCNC: 9 MG/DL (ref 8–23)
BUN/CREAT SERPL: 17 (ref 7–25)
CALCIUM SPEC-SCNC: 9 MG/DL (ref 8.6–10.5)
CHLORIDE SERPL-SCNC: 91 MMOL/L (ref 98–107)
CO2 SERPL-SCNC: 25.5 MMOL/L (ref 22–29)
CREAT BLD-MCNC: 0.53 MG/DL (ref 0.57–1)
DEPRECATED RDW RBC AUTO: 53.6 FL (ref 37–54)
EOSINOPHIL # BLD AUTO: 0 10*3/MM3 (ref 0–0.7)
EOSINOPHIL NFR BLD AUTO: 0 % (ref 0.3–6.2)
ERYTHROCYTE [DISTWIDTH] IN BLOOD BY AUTOMATED COUNT: 15.2 % (ref 11.7–13)
GFR SERPL CREATININE-BSD FRML MDRD: 109 ML/MIN/1.73
GLOBULIN UR ELPH-MCNC: 2.7 GM/DL
GLUCOSE BLD-MCNC: 153 MG/DL (ref 65–99)
HCT VFR BLD AUTO: 30 % (ref 35.6–45.5)
HGB BLD-MCNC: 10.1 G/DL (ref 11.9–15.5)
HOLD SPECIMEN: NORMAL
HOLD SPECIMEN: NORMAL
IMM GRANULOCYTES # BLD: 0.02 10*3/MM3 (ref 0–0.03)
IMM GRANULOCYTES NFR BLD: 0.4 % (ref 0–0.5)
LYMPHOCYTES # BLD AUTO: 0.32 10*3/MM3 (ref 0.9–4.8)
LYMPHOCYTES NFR BLD AUTO: 6 % (ref 19.6–45.3)
MCH RBC QN AUTO: 32.5 PG (ref 26.9–32)
MCHC RBC AUTO-ENTMCNC: 33.7 G/DL (ref 32.4–36.3)
MCV RBC AUTO: 96.5 FL (ref 80.5–98.2)
MONOCYTES # BLD AUTO: 0.35 10*3/MM3 (ref 0.2–1.2)
MONOCYTES NFR BLD AUTO: 6.6 % (ref 5–12)
NEUTROPHILS # BLD AUTO: 4.62 10*3/MM3 (ref 1.9–8.1)
NEUTROPHILS NFR BLD AUTO: 87 % (ref 42.7–76)
NRBC BLD MANUAL-RTO: 0 /100 WBC (ref 0–0)
NT-PROBNP SERPL-MCNC: 2028 PG/ML (ref 0–1800)
PLAT MORPH BLD: NORMAL
PLATELET # BLD AUTO: 235 10*3/MM3 (ref 140–500)
PMV BLD AUTO: 9.7 FL (ref 6–12)
POTASSIUM BLD-SCNC: 4.1 MMOL/L (ref 3.5–5.2)
PROCALCITONIN SERPL-MCNC: 0.04 NG/ML (ref 0.1–0.25)
PROT SERPL-MCNC: 6.6 G/DL (ref 6–8.5)
RBC # BLD AUTO: 3.11 10*6/MM3 (ref 3.9–5.2)
ROULEAUX BLD QL SMEAR: NORMAL
SODIUM BLD-SCNC: 127 MMOL/L (ref 136–145)
TROPONIN T SERPL-MCNC: <0.01 NG/ML (ref 0–0.03)
WBC MORPH BLD: NORMAL
WBC NRBC COR # BLD: 5.31 10*3/MM3 (ref 4.5–10.7)
WHOLE BLOOD HOLD SPECIMEN: NORMAL
WHOLE BLOOD HOLD SPECIMEN: NORMAL

## 2017-01-06 PROCEDURE — 85025 COMPLETE CBC W/AUTO DIFF WBC: CPT | Performed by: EMERGENCY MEDICINE

## 2017-01-06 PROCEDURE — 99285 EMERGENCY DEPT VISIT HI MDM: CPT

## 2017-01-06 PROCEDURE — 84145 PROCALCITONIN (PCT): CPT | Performed by: EMERGENCY MEDICINE

## 2017-01-06 PROCEDURE — 71020 HC CHEST PA AND LATERAL: CPT

## 2017-01-06 PROCEDURE — 93005 ELECTROCARDIOGRAM TRACING: CPT

## 2017-01-06 PROCEDURE — 93010 ELECTROCARDIOGRAM REPORT: CPT | Performed by: INTERNAL MEDICINE

## 2017-01-06 PROCEDURE — 94640 AIRWAY INHALATION TREATMENT: CPT

## 2017-01-06 PROCEDURE — 87040 BLOOD CULTURE FOR BACTERIA: CPT | Performed by: EMERGENCY MEDICINE

## 2017-01-06 PROCEDURE — 83880 ASSAY OF NATRIURETIC PEPTIDE: CPT | Performed by: EMERGENCY MEDICINE

## 2017-01-06 PROCEDURE — 80053 COMPREHEN METABOLIC PANEL: CPT | Performed by: EMERGENCY MEDICINE

## 2017-01-06 PROCEDURE — 84484 ASSAY OF TROPONIN QUANT: CPT | Performed by: EMERGENCY MEDICINE

## 2017-01-06 PROCEDURE — 94799 UNLISTED PULMONARY SVC/PX: CPT

## 2017-01-06 PROCEDURE — 85007 BL SMEAR W/DIFF WBC COUNT: CPT | Performed by: EMERGENCY MEDICINE

## 2017-01-06 PROCEDURE — 25010000002 METHYLPREDNISOLONE PER 125 MG: Performed by: EMERGENCY MEDICINE

## 2017-01-06 RX ORDER — IPRATROPIUM BROMIDE AND ALBUTEROL SULFATE 2.5; .5 MG/3ML; MG/3ML
3 SOLUTION RESPIRATORY (INHALATION)
Status: DISCONTINUED | OUTPATIENT
Start: 2017-01-06 | End: 2017-01-10 | Stop reason: HOSPADM

## 2017-01-06 RX ORDER — FLUTICASONE PROPIONATE 50 MCG
2 SPRAY, SUSPENSION (ML) NASAL DAILY
Status: DISCONTINUED | OUTPATIENT
Start: 2017-01-07 | End: 2017-01-10 | Stop reason: HOSPADM

## 2017-01-06 RX ORDER — PREDNISONE 20 MG/1
20 TABLET ORAL
Status: DISCONTINUED | OUTPATIENT
Start: 2017-01-07 | End: 2017-01-10 | Stop reason: HOSPADM

## 2017-01-06 RX ORDER — LOSARTAN POTASSIUM 50 MG/1
50 TABLET ORAL 2 TIMES DAILY
Status: DISCONTINUED | OUTPATIENT
Start: 2017-01-06 | End: 2017-01-10 | Stop reason: HOSPADM

## 2017-01-06 RX ORDER — FUROSEMIDE 40 MG/1
40 TABLET ORAL DAILY
Status: DISCONTINUED | OUTPATIENT
Start: 2017-01-07 | End: 2017-01-10 | Stop reason: HOSPADM

## 2017-01-06 RX ORDER — ATORVASTATIN CALCIUM 40 MG/1
40 TABLET, FILM COATED ORAL DAILY
Status: DISCONTINUED | OUTPATIENT
Start: 2017-01-07 | End: 2017-01-10 | Stop reason: HOSPADM

## 2017-01-06 RX ORDER — VORICONAZOLE 200 MG/1
200 TABLET, FILM COATED ORAL EVERY 12 HOURS SCHEDULED
Status: DISCONTINUED | OUTPATIENT
Start: 2017-01-06 | End: 2017-01-10 | Stop reason: HOSPADM

## 2017-01-06 RX ORDER — BUDESONIDE AND FORMOTEROL FUMARATE DIHYDRATE 160; 4.5 UG/1; UG/1
2 AEROSOL RESPIRATORY (INHALATION)
Status: DISCONTINUED | OUTPATIENT
Start: 2017-01-06 | End: 2017-01-10 | Stop reason: HOSPADM

## 2017-01-06 RX ORDER — IPRATROPIUM BROMIDE AND ALBUTEROL SULFATE 2.5; .5 MG/3ML; MG/3ML
3 SOLUTION RESPIRATORY (INHALATION)
Status: DISCONTINUED | OUTPATIENT
Start: 2017-01-06 | End: 2017-01-06

## 2017-01-06 RX ORDER — IPRATROPIUM BROMIDE AND ALBUTEROL SULFATE 2.5; .5 MG/3ML; MG/3ML
3 SOLUTION RESPIRATORY (INHALATION) ONCE
Status: COMPLETED | OUTPATIENT
Start: 2017-01-06 | End: 2017-01-06

## 2017-01-06 RX ORDER — METHYLPREDNISOLONE SODIUM SUCCINATE 125 MG/2ML
125 INJECTION, POWDER, LYOPHILIZED, FOR SOLUTION INTRAMUSCULAR; INTRAVENOUS ONCE
Status: COMPLETED | OUTPATIENT
Start: 2017-01-06 | End: 2017-01-06

## 2017-01-06 RX ORDER — DILTIAZEM HYDROCHLORIDE 180 MG/1
180 CAPSULE, COATED, EXTENDED RELEASE ORAL DAILY
Status: DISCONTINUED | OUTPATIENT
Start: 2017-01-07 | End: 2017-01-10 | Stop reason: HOSPADM

## 2017-01-06 RX ORDER — FLUNISOLIDE 0.25 MG/ML
1 SOLUTION NASAL EVERY 12 HOURS
Status: DISCONTINUED | OUTPATIENT
Start: 2017-01-06 | End: 2017-01-06 | Stop reason: CLARIF

## 2017-01-06 RX ORDER — DABIGATRAN ETEXILATE 150 MG/1
150 CAPSULE ORAL EVERY 12 HOURS SCHEDULED
Status: DISCONTINUED | OUTPATIENT
Start: 2017-01-06 | End: 2017-01-10 | Stop reason: HOSPADM

## 2017-01-06 RX ORDER — IPRATROPIUM BROMIDE AND ALBUTEROL SULFATE 2.5; .5 MG/3ML; MG/3ML
3 SOLUTION RESPIRATORY (INHALATION) EVERY 4 HOURS PRN
Status: DISCONTINUED | OUTPATIENT
Start: 2017-01-06 | End: 2017-01-10 | Stop reason: HOSPADM

## 2017-01-06 RX ORDER — SODIUM CHLORIDE 0.9 % (FLUSH) 0.9 %
10 SYRINGE (ML) INJECTION AS NEEDED
Status: DISCONTINUED | OUTPATIENT
Start: 2017-01-06 | End: 2017-01-10 | Stop reason: HOSPADM

## 2017-01-06 RX ORDER — GUAIFENESIN 600 MG/1
1200 TABLET, EXTENDED RELEASE ORAL 2 TIMES DAILY
Status: DISCONTINUED | OUTPATIENT
Start: 2017-01-06 | End: 2017-01-10 | Stop reason: HOSPADM

## 2017-01-06 RX ORDER — SODIUM CHLORIDE FOR INHALATION 7 %
4 VIAL, NEBULIZER (ML) INHALATION
Status: DISCONTINUED | OUTPATIENT
Start: 2017-01-06 | End: 2017-01-10 | Stop reason: HOSPADM

## 2017-01-06 RX ORDER — ACETAMINOPHEN 325 MG/1
650 TABLET ORAL EVERY 4 HOURS PRN
Status: DISCONTINUED | OUTPATIENT
Start: 2017-01-06 | End: 2017-01-10 | Stop reason: HOSPADM

## 2017-01-06 RX ORDER — SODIUM CHLORIDE 0.9 % (FLUSH) 0.9 %
1-10 SYRINGE (ML) INJECTION AS NEEDED
Status: DISCONTINUED | OUTPATIENT
Start: 2017-01-06 | End: 2017-01-10 | Stop reason: HOSPADM

## 2017-01-06 RX ADMIN — DABIGATRAN ETEXILATE MESYLATE 150 MG: 150 CAPSULE ORAL at 23:58

## 2017-01-06 RX ADMIN — IPRATROPIUM BROMIDE AND ALBUTEROL SULFATE 3 ML: .5; 3 SOLUTION RESPIRATORY (INHALATION) at 23:44

## 2017-01-06 RX ADMIN — METHYLPREDNISOLONE SODIUM SUCCINATE 125 MG: 125 INJECTION, POWDER, FOR SOLUTION INTRAMUSCULAR; INTRAVENOUS at 21:04

## 2017-01-06 RX ADMIN — IPRATROPIUM BROMIDE AND ALBUTEROL SULFATE 3 ML: .5; 3 SOLUTION RESPIRATORY (INHALATION) at 20:50

## 2017-01-06 NOTE — ED NOTES
SOA FOR PAST 8 HOURS. PT RECENTLY TREATED FOR PNEUMONIA.      Elizabeth Dallas RN  01/06/17 2149

## 2017-01-06 NOTE — IP AVS SNAPSHOT
AFTER VISIT SUMMARY             Agnieszka Rizzo           About your hospitalization     You were admitted on:  January 6, 2017 You last received care in the:  [address]       Procedures & Surgeries         Medications    If you or your caregiver advised us that you are currently taking a medication and that medication is marked below as “Resume”, this simply indicates that we have reviewed those medications to make sure our new therapy recommendations do not interfere.  If you have concerns about medications other than those new ones which we are prescribing today, please consult the physician who prescribed them (or your primary physician).  Our review of your home medications is not meant to indicate that we are directing their use.             Your Medications      START taking these medications     acetylcysteine 20 % nebulizer solution   Take 4 mL by nebulization 4 (Four) Times a Day.   Commonly known as:  MUCOMYST   Next Dose Due:  1/9/17           pantoprazole 40 MG EC tablet   Take 1 tablet by mouth Daily.   Last time this was given:  1/9/2017  6:18 AM   Commonly known as:  PROTONIX   Next Dose Due:  1/10/17           potassium chloride 10 MEQ CR tablet   Take 1 tablet by mouth Daily.   Commonly known as:  K-DUR,KLOR-CON             CHANGE how you take these medications     furosemide 20 MG tablet   Take 1 tablet by mouth 2 (Two) Times a Day.   Last time this was given:  1/10/2017  9:02 AM   Commonly known as:  LASIX   What changed:  See the new instructions.           ipratropium-albuterol 0.5-2.5 mg/mL nebulizer   Take 3 mL by nebulization Every 4 (Four) Hours As Needed for wheezing or shortness of air for up to 30 days.   Last time this was given:  1/9/2017 11:31 PM   Commonly known as:  DUO-NEB   What changed:  You were already taking a medication with the same name, and this prescription was added. Make sure you understand how and when to take each.           ipratropium-albuterol 0.5-2.5 mg/mL nebulizer   Take 3 mL by nebulization 4 (Four) Times a Day.   Last time this was given:  1/9/2017 11:31 PM   Commonly known as:  DUO-NEB   What changed:  Another medication with the same name was added. Make sure you understand how and when to take each.   Next Dose Due:  1/9/17           predniSONE 10 MG tablet   2 tabs daily x 3 days then 1 tab daily x 3 days then stop   Last time this was given:  1/10/2017  9:02 AM   Commonly known as:  DELTASONE   What changed:  additional instructions   Next Dose Due:  Follow instructions, start 1/10/17             CONTINUE taking these medications     ADVAIR -21 MCG/ACT inhaler   Inhale 2 puffs 2 (Two) Times a Day.   Generic drug:  fluticasone-salmeterol   Next Dose Due:  1/10/17           ALLERGY RELIEF 10 MG tablet   Take 10 mg by mouth Daily.   Generic drug:  loratadine           atorvastatin 40 MG tablet   Take 40 mg by mouth Daily.   Last time this was given:  1/9/2017  9:16 PM   Commonly known as:  LIPITOR   Next Dose Due:  1/9/17           cholecalciferol 1000 UNITS tablet   Take 1,000 Units by mouth Daily.   Commonly known as:  VITAMIN D3   Next Dose Due:  1/10/17           dabigatran etexilate 150 MG capsu   Take 1 capsule by mouth Every 12 (Twelve) Hours.   Last time this was given:  1/10/2017  9:02 AM   Commonly known as:  PRADAXA   Next Dose Due:  1/9/17           diltiaZEM  MG 24 hr capsule   Take 1 capsule by mouth Daily.   Last time this was given:  1/10/2017  9:02 AM   Commonly known as:  CARTIA XT   Next Dose Due:  1/10/17           Glucosamine-Chondroitin tablet   Take 1 tablet by mouth Daily.   Next Dose Due:  1/10/17           losartan 50 MG tablet   50 mg 2 (Two) Times a Day.   Last time this was given:  1/10/2017  9:02 AM   Commonly known as:  COZAAR   Next Dose Due:  1/9/17           multivitamin tablet   Take 1 tablet by mouth Daily.   Next Dose Due:  1/9/17           SUPER B COMPLEX PO   Take  by mouth.            VERAMYST 27.5 MCG/SPRAY nasal spray   1 spray into each nostril Daily.   Generic drug:  fluticasone   Next Dose Due:  1/10/17           voriconazole 200 MG tablet   Take 200 mg by mouth 2 (Two) Times a Day.   Last time this was given:  1/10/2017  9:02 AM   Commonly known as:  VFEND   Next Dose Due:  1/9/17             STOP taking these medications     calcium carbonate 600 MG tablet   Commonly known as:  OS-EVIN           sulfamethoxazole-trimethoprim 800-160 MG per tablet   Commonly known as:  BACTRIM DS,SEPTRA DS                Where to Get Your Medications      These medications were sent to [facility] [id number] [place] [address] [address] AT [facility] - [phone] [facility] [phone]   [address], [facility] [address]     Phone:  [phone]     acetylcysteine 20 % nebulizer solution    furosemide 20 MG tablet    ipratropium-albuterol 0.5-2.5 mg/mL nebulizer    ipratropium-albuterol 0.5-2.5 mg/mL nebulizer    pantoprazole 40 MG EC tablet    potassium chloride 10 MEQ CR tablet    predniSONE 10 MG tablet                  Your Medications      Your Medication List           Morning Noon Evening Bedtime As Needed    acetylcysteine 20 % nebulizer solution   Take 4 mL by nebulization 4 (Four) Times a Day.   Commonly known as:  MUCOMYST                                            ADVAIR -21 MCG/ACT inhaler   Inhale 2 puffs 2 (Two) Times a Day.   Generic drug:  fluticasone-salmeterol                                      ALLERGY RELIEF 10 MG tablet   Take 10 mg by mouth Daily.   Generic drug:  loratadine                                   atorvastatin 40 MG tablet   Take 40 mg by mouth Daily.   Commonly known as:  LIPITOR                                   cholecalciferol 1000 UNITS tablet   Take 1,000 Units by mouth Daily.   Commonly known as:  VITAMIN D3                                   dabigatran etexilate 150 MG capsu   Take 1 capsule by  mouth Every 12 (Twelve) Hours.   Commonly known as:  PRADAXA                                   diltiaZEM  MG 24 hr capsule   Take 1 capsule by mouth Daily.   Commonly known as:  CARTIA XT                                   furosemide 20 MG tablet   Take 1 tablet by mouth 2 (Two) Times a Day.   Commonly known as:  LASIX                                      Glucosamine-Chondroitin tablet   Take 1 tablet by mouth Daily.                                   * ipratropium-albuterol 0.5-2.5 mg/mL nebulizer   Take 3 mL by nebulization Every 4 (Four) Hours As Needed for wheezing or shortness of air for up to 30 days.   Commonly known as:  DUO-NEB                                   * ipratropium-albuterol 0.5-2.5 mg/mL nebulizer   Take 3 mL by nebulization 4 (Four) Times a Day.   Commonly known as:  DUO-NEB                                            losartan 50 MG tablet   50 mg 2 (Two) Times a Day.   Commonly known as:  COZAAR                                      multivitamin tablet   Take 1 tablet by mouth Daily.                                   pantoprazole 40 MG EC tablet   Take 1 tablet by mouth Daily.   Commonly known as:  PROTONIX                                   potassium chloride 10 MEQ CR tablet   Take 1 tablet by mouth Daily.   Commonly known as:  K-DURKLOR-CON                                   predniSONE 10 MG tablet   2 tabs daily x 3 days then 1 tab daily x 3 days then stop   Commonly known as:  DELTASONE                                   SUPER B COMPLEX PO   Take  by mouth.                                   VERAMYST 27.5 MCG/SPRAY nasal spray   1 spray into each nostril Daily.   Generic drug:  fluticasone                                   voriconazole 200 MG tablet   Take 200 mg by mouth 2 (Two) Times a Day.   Commonly known as:  VFEND                                      * Notice:  This list has 2 medication(s) that are the same as other medications prescribed for you. Read the directions carefully, and  ask your doctor or other care provider to review them with you.             Instructions for After Discharge        Activity Instructions     No restrictions           Diet Instructions     Heart healthy           Other Instructions     Home Nebulizer       Nebulizer Equipment:   Nebulizer w/ Compressor   Nebulizer Accessories:   Nebulizer Kit - Administration Set, Non-Disposable   The face to face evaluation was performed on:  1/9/2017 Comment - with 2 cups   Length of Need (99 Months = Lifetime):  99 Months = Lifetime       Oxygen Therapy       Delivery Modality:  Nasal Cannula   Liters Per Minute:  2   Duration:  With Exertion   Equipment:   Oxygen Concentrator &  &  Portable Gaseous Oxygen System & Portable Oxygen Contents Gaseous &  Conserving Regulator   The face to face evaluation was performed on:  1/8/2017 Comment - and sleep   Length of Need (99 Months = Lifetime):  99 Months = Lifetime               Discharge Instructions       Take 20mg Lasix (not 40mg); BMP (labs) in 1 week and review with Anshul Tucker and [place].    Discharge References/Attachments     ACETYLCYSTEINE INHALATION SOLUTION (ENGLISH)    PANTOPRAZOLE TABLETS (ENGLISH)    POTASSIUM PHOSPHATE; SODIUM PHOSPHATE ORAL TABLET (ENGLISH)    PNEUMONIA, ADULT (ENGLISH)    ATRIAL FIBRILLATION (ENGLISH)    DABIGATRAN ORAL CAPSULES (ENGLISH)    OXYGEN USE AT HOME (ENGLISH)    FUROSEMIDE TABLETS (ENGLISH)    ALBUTEROL; IPRATROPIUM INHALATION AEROSOL (ENGLISH)    PREDNISONE TABLETS (ENGLISH)       Follow-ups for After Discharge        Follow-up Information     Follow up with Rogelio Tucker MD Follow up on 1/19/2017.    Specialties:  Pulmonary Disease, Sleep Medicine    Why:  at 10:00 am    Contact information:    [facility] [place] [address]  [phone]          Follow up with Gabe Horne MD Follow up in 1 week(s).    Specialty:  Internal Medicine    Why:  Follow up on 1/16/2016 at 11:00 a.m     Contact  information:    [address]  [address]  [facility] [address]  [phone]          Follow up with Marcie Robbins MD Follow up in 2 week(s).    Specialty:  Cardiology    Why:  Follow up on 1/24/2017 at 12:00       Contact information:    [address]  [address]  [facility] [address]  [phone]          Follow up with [facility] .    Specialty:  Home Health Services    Contact information:    [address]  [facility] [address]          Follow up with [facility] .    Specialty:  Home Health Services    Contact information:    [address]  [facility] [address] [phone]      Referrals and Follow-ups to Schedule     Ambulatory Referral to Home Health    As directed    Face to Face Visit Date:  1/9/2017   Follow-up Provider for Plan of Care?:  I treated the patient in an acute care facility and will not continue treatment after discharge.   Follow-up Provider:  GALDINO SIERRA   Reason/Clinical Findings:  acute on chronic respiratory failure and decompensated CHF   Describe mobility limitations that make leaving home difficult:  hypoxia; afib; chf   Nursing/Therapeutic Services Requested:   Physical Therapy  Skilled Nursing      Skilled nursing orders:   CHF management  Cardiopulmonary assessments      PT orders:  Strengthening   Frequency:  Other       Basic Metabolic Panel    Jan 17, 2017            Additional information on Labs and Follow-ups:      Follow up with primary care provider within 1-2 weeks after discharge.               Scheduled Appointments     Jan 24, 2017 12:00 PM [facility]   Hospital Follow Up with Marcie Robbins MD   [facility] CARDIOLOGY (--)    [address]  [facility] [id number] [phone]            Additional instructions:        Follow up appointment with Dr. Tucker is scheduled for January 19 th   at 10:00 a.m . If you cannot attend your appointment please call [phone] to reschedule.    Follow up appointment with Dr. Horne is scheduled for  at 11:00 a.m . If you cannot attend your appointment please call [phone] to reschedule.    Follow up appointment with  is scheduled for  at 12:00 p.m . If you cannot attend your appointment please call [phone] to reschedule.                [employer] Signup     [facility] [employer] allows you to send messages to your doctor, view your test results, renew your prescriptions, schedule appointments, and more. To sign up, go to [employer] and click on the Sign Up Now link in the New User? box. Enter your [employer] Activation Code exactly as it appears below along with the last four digits of your Social Security Number and your Date of Birth () to complete the sign-up process. If you do not sign up before the expiration date, you must request a new code.    [employer] Activation Code: [email or web address]  Expires: 2017  5:06 PM    If you have questions, you can email [email or web address] or call [phone] to talk to our [employer] staff. Remember, [employer] is NOT to be used for urgent needs. For medical emergencies, dial 911.           Summary of Your Hospitalization        Reason for Hospitalization     Your primary diagnosis was:  Heart Failure, Diastolic, With Acute Decompensation    Your diagnoses also included:  Pneumonia With The Fungal Infection Aspergillosis, Atrial Fibrillation (Irregular Heartbeat), Acid-Fast Bacteria Present      Care Providers     Provider Service Role Specialty    Jaylen Ann MD -- Attending Provider Pulmonary Disease    Marcie Robbins MD -- Consulting Physician  Cardiology       Your Allergies  Date Reviewed: 1/10/2017    Allergen Reactions    Erythromycin Not Noted         Levaquin (Levofloxacin) Not Noted         Macrobid (Nitrofurantoin) Not Noted       Pending Labs     Order Current Status    Blood Culture Preliminary result    Blood Culture Preliminary result      Patient Belongings Returned     Document Return of Belongings Flowsheet     Were the patient bedside belongings sent home?   Yes   Belongings Retrieved from Security & Sent Home   N/A    Belongings Sent to Safe   --   Medications Retrieved from Pharmacy & Sent Home   N/A              More Information      Acetylcysteine inhalation solution  What is this medicine?  ACETYLCYSTEINE (a se wendy SIS teen) is a drug that loosens and thins mucus in the lungs. It is used to make breathing easier in patients with bronchitis, cystic fibrosis, emphysema, tuberculosis, or other lung problems.  This medicine may be used for other purposes; ask your health care provider or pharmacist if you have questions.  What should I tell my health care provider before I take this medicine?  They need to know if you have any of these conditions:  -asthma  -lung blockage  -stomach ulcer or varices  -an unusual or allergic reaction to acetylcysteine, other medicines, foods, dyes, or preservatives  -pregnant or trying to get pregnant  -breast-feeding  How should I use this medicine?  This medicine is usually inhaled into the lungs using a nebulizer. In a hospital or clinic setting, a health care provider may put this medicine directly into the lungs through a trachea tube or give by mouth in an emergency. Follow the directions on the prescription label. For inhalation through a nebulizer, dilute with sterile saline or water as directed. You will be taught how to use your nebulizer. Use diluted medicine within 1 hour of mixing. Take your medicine at regular intervals. Do not take your medicine more often than directed.  Talk to your pediatrician regarding the use of this medicine in children. Special care may be needed.  Overdosage: If you think you have taken too much of this medicine contact a poison control center or emergency  room at once.  NOTE: This medicine is only for you. Do not share this medicine with others.  What if I miss a dose?  If you miss a dose, take it as soon as you can. If it is almost time for your next dose, take only that dose. Do not take double or extra doses.  What may interact with this medicine?  Interactions are not expected.  This list may not describe all possible interactions. Give your health care provider a list of all the medicines, herbs, non-prescription drugs, or dietary supplements you use. Also tell them if you smoke, drink alcohol, or use illegal drugs. Some items may interact with your medicine.  What should I watch for while using this medicine?  Tell your doctor or health care professional if your symptoms do not improve or if you get worse. If you are using a nebulizer at home, make sure you understand how to use it properly.  Drink water as directed. This will help to loosen and thin mucus.  Inhalations of this medicine can leave a sticky film in your mouth and on your face. Rinse with water after each treatment.  What side effects may I notice from receiving this medicine?  Side effects that you should report to your doctor or health care professional as soon as possible:  -allergic reactions like skin rash, itching or hives, swelling of the face, lips, or tongue  -breathing problems  -chest tightness, pain  -clamminess  -coughing up blood  -fever  Side effects that usually do not require medical attention (report to your doctor or health care professional if they continue or are bothersome):  -changes in taste  -drowsiness  -mouth sores  -nausea, vomiting  -runny nose  This list may not describe all possible side effects. Call your doctor for medical advice about side effects. You may report side effects to FDA at 3-922-FDA-7690.  Where should I keep my medicine?  Keep out of the reach of children.  Store unopened bottles at room temperature between 15 and 30 degrees C (59 and 86 degrees F).  If only some of a bottle is used, store the remaining undiluted medicine in a refrigerator. Use open bottles within 96 hours (4 days). Use diluted medicine within 1 hour. Throw away any unused medicine after the expiration date.  NOTE: This sheet is a summary. It may not cover all possible information. If you have questions about this medicine, talk to your doctor, pharmacist, or health care provider.     © 2016, Elsevier/Gold Standard. (2011-10-12 14:12:18)          Pantoprazole tablets  What is this medicine?  PANTOPRAZOLE (pan TOE pra zole) prevents the production of acid in the stomach. It is used to treat gastroesophageal reflux disease (GERD), inflammation of the esophagus, and Zollinger-Meyer syndrome.  This medicine may be used for other purposes; ask your health care provider or pharmacist if you have questions.  What should I tell my health care provider before I take this medicine?  They need to know if you have any of these conditions:  -liver disease  -low levels of magnesium in the blood  -an unusual or allergic reaction to omeprazole, lansoprazole, pantoprazole, rabeprazole, other medicines, foods, dyes, or preservatives  -pregnant or trying to get pregnant  -breast-feeding  How should I use this medicine?  Take this medicine by mouth. Swallow the tablets whole with a drink of water. Follow the directions on the prescription label. Do not crush, break, or chew. Take your medicine at regular intervals. Do not take your medicine more often than directed.  Talk to your pediatrician regarding the use of this medicine in children. While this drug may be prescribed for children as young as 5 years for selected conditions, precautions do apply.  Overdosage: If you think you have taken too much of this medicine contact a poison control center or emergency room at once.  NOTE: This medicine is only for you. Do not share this medicine with others.  What if I miss a dose?  If you miss a dose, take it as soon  as you can. If it is almost time for your next dose, take only that dose. Do not take double or extra doses.  What may interact with this medicine?  Do not take this medicine with any of the following medications:  -atazanavir  -nelfinavir  This medicine may also interact with the following medications:  -ampicillin  -delavirdine  -erlotinib  -iron salts  -medicines for fungal infections like ketoconazole, itraconazole and voriconazole  -methotrexate  -mycophenolate mofetil  -warfarin  This list may not describe all possible interactions. Give your health care provider a list of all the medicines, herbs, non-prescription drugs, or dietary supplements you use. Also tell them if you smoke, drink alcohol, or use illegal drugs. Some items may interact with your medicine.  What should I watch for while using this medicine?  It can take several days before your stomach pain gets better. Check with your doctor or health care professional if your condition does not start to get better, or if it gets worse.  You may need blood work done while you are taking this medicine.  What side effects may I notice from receiving this medicine?  Side effects that you should report to your doctor or health care professional as soon as possible:  -allergic reactions like skin rash, itching or hives, swelling of the face, lips, or tongue  -bone, muscle or joint pain  -breathing problems  -chest pain or chest tightness  -dark yellow or brown urine  -dizziness  -fast, irregular heartbeat  -feeling faint or lightheaded  -fever or sore throat  -muscle spasm  -palpitations  -redness, blistering, peeling or loosening of the skin, including inside the mouth  -seizures  -tremors  -unusual bleeding or bruising  -unusually weak or tired  -yellowing of the eyes or skin  Side effects that usually do not require medical attention (Report these to your doctor or health care professional if they continue or are  bothersome.):  -constipation  -diarrhea  -dry mouth  -headache  -nausea  This list may not describe all possible side effects. Call your doctor for medical advice about side effects. You may report side effects to FDA at 5-303-FDA-9190.  Where should I keep my medicine?  Keep out of the reach of children.  Store at room temperature between 15 and 30 degrees C (59 and 86 degrees F). Protect from light and moisture. Throw away any unused medicine after the expiration date.  NOTE: This sheet is a summary. It may not cover all possible information. If you have questions about this medicine, talk to your doctor, pharmacist, or health care provider.     © 2016, Else[employer]/Gold Standard. (2016-02-05 14:45:56)          Potassium phosphate; Sodium Phosphate oral tablet  What is this medicine?  Potassium phosphate; sodium phosphate (adriana Tass i um FOS fate; DAYNA analisa um FOS fate) is used to increase phosphorus in the body. It is used for people who are not getting enough phosphorus from their diet or who need increased amounts.  This medicine may be used for other purposes; ask your health care provider or pharmacist if you have questions.  What should I tell my health care provider before I take this medicine?  They need to know if you have any of these conditions:  -[place]'s disease  -dehydration  -heart disease  -high levels of phosphate in the blood  -kidney disease  -phosphate kidney stones  -sodium-restricted diet  -an unusual or allergic reaction to phosphorus salts, other medicines, foods, dyes, or preservatives  -pregnant or trying to get pregnant  -breast-feeding  How should I use this medicine?  Take this medicine by mouth with a full glass of water. Follow the directions on the prescription label. Take your medicine at regular intervals. Do not take it more often than directed. Do not stop taking except on your doctor's advice.  Talk to your pediatrician regarding the use of this medicine in children. While this drug  may be prescribed for children as young as 5 years for selected conditions, precautions do apply.  Overdosage: If you think you have taken too much of this medicine contact a poison control center or emergency room at once.  NOTE: This medicine is only for you. Do not share this medicine with others.  What if I miss a dose?  If you miss a dose, take it as soon as you can. If it is almost time for your next dose, take only that dose. Do not take double or extra doses.  What may interact with this medicine?  Do not take this medicine with any of the following medications:  -sevelamerThis medicine may also interact with the following medications:  -antacids containing aluminum, magnesium, or calcium  -calcium supplements  -cyclosporine  -diuretics  -eplerenone  -iron supplements  -magnesium supplements  -medicines for blood pressure like captopril, enalapril, lisinopril  -potassium supplements, salt substitutes, or low-salt milk  -vitamin D supplements  This list may not describe all possible interactions. Give your health care provider a list of all the medicines, herbs, non-prescription drugs, or dietary supplements you use. Also tell them if you smoke, drink alcohol, or use illegal drugs. Some items may interact with your medicine.  What should I watch for while using this medicine?  Visit your health care professional for regular checks on your progress. Your doctor may order blood work while you are taking this medicine.  You may pass a kidney stone after starting this medicine. Contact your doctor if you have new or unusual symptoms.  You may have diarrhea after starting this medicine. Contact your doctor if it continues or gets worse.  What side effects may I notice from receiving this medicine?  Side effects that you should report to your doctor or health care professional as soon as possible:  -allergic reactions like skin rash, itching or hives, swelling of the face, lips, or tongue  -breathing  problems  -confusion  -fast, irregular heartbeat  -feeling faint or lightheaded, falls  -muscle cramps  -tingling, pain, or numbness in the hands or feet  -unusually weak or tired  Side effects that usually do not require medical attention (Report these to your doctor or health care professional if they continue or are bothersome.):  -diarrhea  -headache  -nausea, vomiting  -stomach pain  This list may not describe all possible side effects. Call your doctor for medical advice about side effects. You may report side effects to FDA at 1-585-FDA-2748.  Where should I keep my medicine?  Keep out of the reach of children.  Store at room temperature between 20 and 25 degrees C (68 and 77 degrees F). Protect from light. Throw away any unused medicine after the expiration date.  NOTE: This sheet is a summary. It may not cover all possible information. If you have questions about this medicine, talk to your doctor, pharmacist, or health care provider.     © 2016, [employer]. (2015-09-24 16:43:00)          Community-Acquired Pneumonia, Adult  Pneumonia is an infection of the lungs. There are different types of pneumonia. One type can develop while a person is in a hospital. A different type, called community-acquired pneumonia, develops in people who are not, or have not recently been, in the hospital or other health care facility.   CAUSES  Pneumonia may be caused by bacteria, viruses, or funguses. Community-acquired pneumonia is often caused by Streptococcus pneumonia bacteria. These bacteria are often passed from one person to another by breathing in droplets from the cough or sneeze of an infected person.  RISK FACTORS  The condition is more likely to develop in:  · People who have chronic diseases, such as chronic obstructive pulmonary disease (COPD), asthma, congestive heart failure, cystic fibrosis, diabetes, or kidney disease.  · People who have early-stage or late-stage HIV.  · People who have sickle  cell disease.  · People who have had their spleen removed (splenectomy).  · People who have poor dental hygiene.  · People who have medical conditions that increase the risk of breathing in (aspirating) secretions their own mouth and nose.    · People who have a weakened immune system (immunocompromised).  · People who smoke.  · People who travel to areas where pneumonia-causing germs commonly exist.  · People who are around animal habitats or animals that have pneumonia-causing germs, including birds, bats, rabbits, cats, and farm animals.  SYMPTOMS  Symptoms of this condition include:  · A dry cough.  · A wet (productive) cough.  · Fever.  · Sweating.  · Chest pain, especially when breathing deeply or coughing.  · Rapid breathing or difficulty breathing.  · Shortness of breath.  · Shaking chills.  · Fatigue.  · Muscle aches.  DIAGNOSIS  Your health care provider will take a medical history and perform a physical exam. You may also have other tests, including:  · Imaging studies of your chest, including X-rays.  · Tests to check your blood oxygen level and other blood gases.  · Other tests on blood, mucus (sputum), fluid around your lungs (pleural fluid), and urine.  If your pneumonia is severe, other tests may be done to identify the specific cause of your illness.  TREATMENT  The type of treatment that you receive depends on many factors, such as the cause of your pneumonia, the medicines you take, and other medical conditions that you have. For most adults, treatment and recovery from pneumonia may occur at home. In some cases, treatment must happen in a hospital. Treatment may include:  · Antibiotic medicines, if the pneumonia was caused by bacteria.  · Antiviral medicines, if the pneumonia was caused by a virus.  · Medicines that are given by mouth or through an IV tube.  · Oxygen.  · Respiratory therapy.  Although rare, treating severe pneumonia may include:  · Mechanical ventilation. This is done if you are  not breathing well on your own and you cannot maintain a safe blood oxygen level.  · Thoracentesis. This procedure removes fluid around one lung or both lungs to help you breathe better.  HOME CARE INSTRUCTIONS  · Take over-the-counter and prescription medicines only as told by your health care provider.    Only take cough medicine if you are losing sleep. Understand that cough medicine can prevent your body's natural ability to remove mucus from your lungs.    If you were prescribed an antibiotic medicine, take it as told by your health care provider. Do not stop taking the antibiotic even if you start to feel better.  · Sleep in a semi-upright position at night. Try sleeping in a reclining chair, or place a few pillows under your head.  · Do not use tobacco products, including cigarettes, chewing tobacco, and e-cigarettes. If you need help quitting, ask your health care provider.  · Drink enough water to keep your urine clear or pale yellow. This will help to thin out mucus secretions in your lungs.  PREVENTION  There are ways that you can decrease your risk of developing community-acquired pneumonia. Consider getting a pneumococcal vaccine if:  · You are older than 65 years of age.  · You are older than 19 years of age and are undergoing cancer treatment, have chronic lung disease, or have other medical conditions that affect your immune system. Ask your health care provider if this applies to you.  There are different types and schedules of pneumococcal vaccines. Ask your health care provider which vaccination option is best for you.  You may also prevent community-acquired pneumonia if you take these actions:  · Get an influenza vaccine every year. Ask your health care provider which type of influenza vaccine is best for you.  · Go to the dentist on a regular basis.  · Wash your hands often. Use hand  if soap and water are not available.  SEEK MEDICAL CARE IF:  · You have a fever.  · You are losing  sleep because you cannot control your cough with cough medicine.  SEEK IMMEDIATE MEDICAL CARE IF:  · You have worsening shortness of breath.  · You have increased chest pain.  · Your sickness becomes worse, especially if you are an older adult or have a weakened immune system.  · You cough up blood.     This information is not intended to replace advice given to you by your health care provider. Make sure you discuss any questions you have with your health care provider.     Document Released: 12/18/2006 Document Revised: 09/07/2016 Document Reviewed: 04/13/2016  [employer] Interactive Patient Education ©2016 [employer]          Atrial Fibrillation  Atrial fibrillation is a type of irregular or rapid heartbeat (arrhythmia). In atrial fibrillation, the heart quivers continuously in a chaotic pattern. This occurs when parts of the heart receive disorganized signals that make the heart unable to pump blood normally. This can increase the risk for stroke, heart failure, and other heart-related conditions. There are different types of atrial fibrillation, including:  · Paroxysmal atrial fibrillation. This type starts suddenly, and it usually stops on its own shortly after it starts.  · Persistent atrial fibrillation. This type often lasts longer than a week. It may stop on its own or with treatment.  · Long-lasting persistent atrial fibrillation. This type lasts longer than 12 months.  · Permanent atrial fibrillation. This type does not go away.  Talk with your health care provider to learn about the type of atrial fibrillation that you have.  CAUSES  This condition is caused by some heart-related conditions or procedures, including:  · A heart attack.  · Coronary artery disease.  · Heart failure.  · Heart valve conditions.  · High blood pressure.  · Inflammation of the sac that surrounds the heart (pericarditis).  · Heart surgery.  · Certain heart rhythm disorders, such as Ashraf-Parkinson-White syndrome.  Other causes  include:  · Pneumonia.  · Obstructive sleep apnea.  · Blockage of an artery in the lungs (pulmonary embolism, or PE).  · Lung cancer.  · Chronic lung disease.  · Thyroid problems, especially if the thyroid is overactive (hyperthyroidism).  · Caffeine.  · Excessive alcohol use or illegal drug use.  · Use of some medicines, including certain decongestants and diet pills.  Sometimes, the cause cannot be found.  RISK FACTORS  This condition is more likely to develop in:  · People who are older in age.  · People who smoke.  · People who have diabetes mellitus.  · People who are overweight (obese).  · Athletes who exercise vigorously.  SYMPTOMS  Symptoms of this condition include:  · A feeling that your heart is beating rapidly or irregularly.  · A feeling of discomfort or pain in your chest.  · Shortness of breath.  · Sudden light-headedness or weakness.  · Getting tired easily during exercise.  In some cases, there are no symptoms.  DIAGNOSIS  Your health care provider may be able to detect atrial fibrillation when taking your pulse. If detected, this condition may be diagnosed with:  · An electrocardiogram (ECG).  · A Holter monitor test that records your heartbeat patterns over a 24-hour period.  · Transthoracic echocardiogram (TTE) to evaluate how blood flows through your heart.  · Transesophageal echocardiogram (WILBUR) to view more detailed images of your heart.  · A stress test.  · Imaging tests, such as a CT scan or chest X-ray.  · Blood tests.  TREATMENT  The main goals of treatment are to prevent blood clots from forming and to keep your heart beating at a normal rate and rhythm. The type of treatment that you receive depends on many factors, such as your underlying medical conditions and how you feel when you are experiencing atrial fibrillation.  This condition may be treated with:  · Medicine to slow down the heart rate, bring the heart's rhythm back to normal, or prevent clots from forming.  · Electrical  cardioversion. This is a procedure that resets your heart's rhythm by delivering a controlled, low-energy shock to the heart through your skin.  · Different types of ablation, such as catheter ablation, catheter ablation with pacemaker, or surgical ablation. These procedures destroy the heart tissues that send abnormal signals. When the pacemaker is used, it is placed under your skin to help your heart beat in a regular rhythm.  HOME CARE INSTRUCTIONS  · Take over-the counter and prescription medicines only as told by your health care provider.  · If your health care provider prescribed a blood-thinning medicine (anticoagulant), take it exactly as told. Taking too much blood-thinning medicine can cause bleeding. If you do not take enough blood-thinning medicine, you will not have the protection that you need against stroke and other problems.  · Do not use tobacco products, including cigarettes, chewing tobacco, and e-cigarettes. If you need help quitting, ask your health care provider.  · If you have obstructive sleep apnea, manage your condition as told by your health care provider.  · Do not drink alcohol.  · Do not drink beverages that contain caffeine, such as coffee, soda, and tea.  · Maintain a healthy weight. Do not use diet pills unless your health care provider approves. Diet pills may make heart problems worse.  · Follow diet instructions as told by your health care provider.  · Exercise regularly as told by your health care provider.  · Keep all follow-up visits as told by your health care provider. This is important.  PREVENTION  · Avoid drinking beverages that contain caffeine or alcohol.  · Avoid certain medicines, especially medicines that are used for breathing problems.  · Avoid certain herbs and herbal medicines, such as those that contain ephedra or ginseng.  · Do not use illegal drugs, such as cocaine and amphetamines.  · Do not smoke.  · Manage your high blood pressure.  SEEK MEDICAL CARE  IF:  · You notice a change in the rate, rhythm, or strength of your heartbeat.  · You are taking an anticoagulant and you notice increased bruising.  · You tire more easily when you exercise or exert yourself.  SEEK IMMEDIATE MEDICAL CARE IF:  · You have chest pain, abdominal pain, sweating, or weakness.  · You feel nauseous.  · You notice blood in your vomit, bowel movement, or urine.  · You have shortness of breath.  · You suddenly have swollen feet and ankles.  · You feel dizzy.  · You have sudden weakness or numbness of the face, arm, or leg, especially on one side of the body.  · You have trouble speaking, trouble understanding, or both (aphasia).  · Your face or your eyelid droops on one side.  These symptoms may represent a serious problem that is an emergency. Do not wait to see if the symptoms will go away. Get medical help right away. Call your local emergency services (911 in the [place] Do not drive yourself to the hospital.     This information is not intended to replace advice given to you by your health care provider. Make sure you discuss any questions you have with your health care provider.     Document Released: 12/18/2006 Document Revised: 09/07/2016 Document Reviewed: 04/13/2016  [employer] Interactive Patient Education ©2016 [employer]          Dabigatran oral capsules  What is this medicine?  DABIGATRAN (DA bi GAT ran) is an anticoagulant (blood thinner). It is used to lower the chance of stroke in people with a medical condition called atrial fibrillation. It is also used to treat or prevent blood clots in the lungs or in the veins.  This medicine may be used for other purposes; ask your health care provider or pharmacist if you have questions.  What should I tell my health care provider before I take this medicine?  They need to know if you have any of these conditions:  -bleeding disorders  -history of stomach bleeding  -mechanical heart valve  -kidney disease  -recent or planned spinal or  epidural procedure  -an allergic reaction to dabigatran, other medicines, foods, dyes, or preservatives  -pregnant or trying to get pregnant  -breast-feeding  How should I use this medicine?  Take this medicine by mouth with a full glass of water. Follow the directions on the prescription label. Swallow the capsules whole. Do not break, chew, or empty the contents from the capsule. You can take it with or without food. If it upsets your stomach, take it with food. Take your medicine at regular intervals. Do not take it more often than directed. Do not stop taking except on your doctor's advice. Stopping this medicine may increase your risk of a blot clot. Be sure to refill your prescription before you run out of medicine.  A special MedGuide will be given to you by the pharmacist with each prescription and refill. Be sure to read this information carefully each time.  Talk to your pediatrician regarding the use of this medicine in children. Special care may be needed.  Overdosage: If you think you have taken too much of this medicine contact a poison control center or emergency room at once.  NOTE: This medicine is only for you. Do not share this medicine with others.  What if I miss a dose?  If you miss a dose, take it as soon as you can. If your next dose is less than 6 hours away, skip the missed dose. Do not take double or extra doses.  What may interact with this medicine?  Do not take this medicine with any of the following medications:  -certain medicines that treat or prevent blood clots like warfarin, enoxaparin, and dalteparin  -mifepristone, RU-486This medicine may also interact with the following:  -carbamazepine  -certain medicines for depression  -dronedarone  -ketoconazole  -NSAIDs, medicines for pain and inflammation, like ibuprofen or naproxen  -quinidine  -rifampin  -tipranavir  This list may not describe all possible interactions. Give your health care provider a list of all the medicines, herbs,  non-prescription drugs, or dietary supplements you use. Also tell them if you smoke, drink alcohol, or use illegal drugs. Some items may interact with your medicine.  What should I watch for while using this medicine?  Visit your doctor or health care professional for regular check ups.  Notify your doctor or health care professional and seek emergency treatment if you develop breathing problems; changes in vision; chest pain; severe, sudden headache; pain, swelling, warmth in the leg; trouble speaking; sudden numbness or weakness of the face, arm, or leg. These can be signs that your condition has gotten worse.  If you are going to have surgery, tell your doctor or health care professional that you are taking this medicine.  Tell your health care professional that you use this medicine before you have a spinal or epidural procedure. Sometimes people who take this medicine have bleeding problems around the spine when they have a spinal or epidural procedure. This bleeding is very rare. If you have a spinal or epidural procedure while on this medicine, call your health care professional immediately if you have back pain, numbness or tingling (especially in your legs and feet), muscle weakness, paralysis, or loss of bladder or bowel control.  Avoid sports and activities that might cause injury while you are using this medicine. Severe falls or injuries can cause unseen bleeding. Be careful when using sharp tools or knives. Consider using an electric razor. Take special care brushing or flossing your teeth. Report any injuries, bruising, or red spots on the skin to your doctor or health care professional.  What side effects may I notice from receiving this medicine?  Side effects that you should report to your doctor or health care professional as soon as possible:  -allergic reactions like skin rash, itching or hives, swelling of the face, lips, or tongue  -feeling faint or lightheaded, falls  -signs and symptoms of  bleeding such as bloody or black, tarry stools; red or dark-brown urine; spitting up blood or brown material that looks like coffee grounds; red spots on the skin; unusual bruising or bleeding from the eye, gums, or nose  Side effects that usually do not require medical attention (report these to your doctor or health care professional if they continue or are bothersome):  -stomach pain  -upset stomach  This list may not describe all possible side effects. Call your doctor for medical advice about side effects. You may report side effects to FDA at 1-542-FDA-4250.  Where should I keep my medicine?  Keep out of the reach of children.  Store at room temperature between 15 and 30 degrees C (59 and 86 degrees F). Keep capsules in the original container. Do not store or place capsules in any other container, such as pill boxes or pill organizers. After opening the bottle, use within 4 months. Throw away any unused medicine after 4 months.  NOTE: This sheet is a summary. It may not cover all possible information. If you have questions about this medicine, talk to your doctor, pharmacist, or health care provider.     © 2016, [employer]. (2014-08-16 22:28:33)          Oxygen Use at Home  Oxygen can be prescribed for home use. The prescription will show the flow rate. This is how much oxygen is to be used per minute. This will be listed in liters per minute (LPM or L/M). A liter is a metric measurement of volume.  You will use oxygen therapy as directed. It can be used while exercising, sleeping, or at rest. You may need oxygen continuously. Your health care provider may order a blood oxygen test (arterial blood gas or pulse oximetry test) that will show what your oxygen level is. Your health care provider will use these measurements to learn about your needs and follow your progress.  Home oxygen therapy is commonly used on patients with various lung (pulmonary) related conditions. Some of these conditions  include:  · Asthma.  · Lung cancer.  · Pneumonia.  · Emphysema.  · Chronic bronchitis.  · Cystic fibrosis.  · Other lung diseases.  · Pulmonary fibrosis.  · Occupational lung disease.  · Heart failure.  · Chronic obstructive pulmonary disease (COPD).  3 COMMON WAYS OF PROVIDING OXYGEN THERAPY  · Gas: The gas form of oxygen is put into variously sized cylinders or tanks. The cylinders or oxygen tanks contain compressed oxygen. The cylinder is equipped with a regulator that controls the flow rate. Because the flow of oxygen out of the cylinder is constant, an oxygen conserving device may be attached to the system to avoid waste. This device releases the gas only when you inhale and cuts it off when you exhale. Oxygen can be provided in a small cylinder that can be carried with you. Large tanks are heavy and are only for stationary use. After use, empty tanks must be exchanged for full tanks.  · Liquid: The liquid form of oxygen is put into a container similar to a thermos. When released, the liquid converts to a gas and you breathe it in just like the compressed gas. This storage method takes up less space than the compressed gas cylinder, and you can transfer the liquid to a small, portable vessel at home. Liquid oxygen is more expensive than the compressed gas, and the vessel vents when not in use. An oxygen conserving device may be built into the vessel to conserve the oxygen. Liquid oxygen is very cold, around 297° below zero.  · Oxygen concentrator: This medical device filters oxygen from room air and gives almost 100% oxygen to the patient. Oxygen concentrators are powered by electricity. Benefits of this system are:    It does not need to be resupplied.    It is not as costly as liquid oxygen.    Extra tubing permits the user to move around easier.  There are several types of small, portable oxygen systems available which can help you remain active and mobile. You must have a cylinder of oxygen as a backup in  "the event of a power failure. Advise your electric power company that you are on oxygen therapy in order to get priority service when there is a power failure.  OXYGEN DELIVERY DEVICES  There are 3 common ways to deliver oxygen to your body.  · Nasal cannula. This is a 2-pronged device inserted in the nostrils that is connected to tubing carrying the oxygen. The tubing can rest on the ears or be attached to the frame of eyeglasses.  · Mask. People who need a high flow of oxygen generally use a mask.  · Transtracheal catheter. Transtracheal oxygen therapy requires the insertion of a small, flexible tube (catheter) in the windpipe (trachea). This catheter is held in place by a necklace. Since transtracheal oxygen bypasses the mouth, nose, and throat, a humidifier is absolutely required at flow rates of 1 LPM or greater.  OXYGEN USE SAFETY TIPS  · Never smoke while using oxygen. Oxygen does not burn or explode, but flammable materials will burn faster in the presence of oxygen.  · Keep a fire extinguisher close by. Let your fire department know that you have oxygen in your home.  · Warn visitors not to smoke near you when you are using oxygen. Put up \"no smoking\" signs in your home where you most often use the oxygen.  · When you go to a restaurant with your portable oxygen source, ask to be seated in the nonsmoking section.  · Stay at least 5 feet away from gas stoves, candles, lighted fireplaces, or other heat sources.  · Do not use materials that burn easily (flammable) while using your oxygen.  · If you use an oxygen cylinder, make sure it is secured to some fixed object or in a stand. If you use liquid oxygen, make sure the vessel is kept upright to keep the oxygen from pouring out. Liquid oxygen is so cold it can hurt your skin.  · If you use an oxygen concentrator, call your electric company so you will be given priority service if your power goes out. Avoid using extension cords, if possible.  · Regularly test " your smoke detectors at home to make sure they work. If you receive care in your home from a nurse or other health care provider, he or she may also check to make sure your smoke detectors work.  GUIDELINES FOR CLEANING YOUR EQUIPMENT  · Wash the nasal prongs with a liquid soap. Thoroughly rinse them once or twice a week.  · Replace the prongs every 2 to 4 weeks. If you have an infection (cold, pneumonia) change them when you are well.  · Your health care provider will give you instructions on how to clean your transtracheal catheter.  · The humidifier bottle should be washed with soap and warm water and rinsed thoroughly between each refill. Air-dry the bottle before filling it with sterile or distilled water. The bottle and its top should be disinfected after they are cleaned.  · If you use an oxygen concentrator, unplug the unit. Then wipe down the cabinet with a damp cloth and dry it daily. The air filter should be cleaned at least twice a week.  · Follow your home medical equipment and service company's directions for cleaning the compressor filter.  HOME CARE INSTRUCTIONS   · Do not change the flow of oxygen unless directed by your health care provider.  · Do not use alcohol or other sedating drugs unless instructed. They slow your breathing rate.  · Do not use materials that burn easily (flammable) while using your oxygen.  · Always keep a spare tank of oxygen. Plan ahead for holidays when you may not be able to get a prescription filled.  · Use water-based lubricants on your lips or nostrils. Do not use an oil-based product like petroleum jelly.  · To prevent your cheeks or the skin behind your ears from becoming irritated, tuck some gauze under the tubing.  · If you have persistent redness under your nose, call your health care provider.  · When you no longer need oxygen, your doctor will have the oxygen discontinued. Oxygen is not addicting or habit forming.  · Use the oxygen as instructed. Too much oxygen  can be harmful and too little will not give you the benefit you need.  · Shortness of breath is not always from a lack of oxygen. If your oxygen level is not the cause of your shortness of breath, taking oxygen will not help.  SEEK MEDICAL CARE IF:   · You have frequent headaches.  · You have shortness of breath or a lasting cough.  · You have anxiety.  · You are confused.  · You are drowsy or sleepy all the time.  · You develop an illness which aggravates your breathing.  · You cannot exercise.  · You are restless.  · You have blue lips or fingernails.  · You have difficult or irregular breathing and it is getting worse.  · You have a fever.     This information is not intended to replace advice given to you by your health care provider. Make sure you discuss any questions you have with your health care provider.     Document Released: 03/09/2005 Document Revised: 01/08/2016 Document Reviewed: 07/30/2014  [employer] Interactive Patient Education ©2016 [employer]          Furosemide tablets  What is this medicine?  FUROSEMIDE (fyoor [place] se mide) is a diuretic. It helps you make more urine and to lose salt and excess water from your body. This medicine is used to treat high blood pressure, and edema or swelling from heart, kidney, or liver disease.  This medicine may be used for other purposes; ask your health care provider or pharmacist if you have questions.  What should I tell my health care provider before I take this medicine?  They need to know if you have any of these conditions:  -abnormal blood electrolytes  -diarrhea or vomiting  -gout  -heart disease  -kidney disease, small amounts of urine, or difficulty passing urine  -liver disease  -thyroid disease  -an unusual or allergic reaction to furosemide, sulfa drugs, other medicines, foods, dyes, or preservatives  -pregnant or trying to get pregnant  -breast-feeding  How should I use this medicine?  Take this medicine by mouth with a glass of water. Follow the  directions on the prescription label. You may take this medicine with or without food. If it upsets your stomach, take it with food or milk. Do not take your medicine more often than directed. Remember that you will need to pass more urine after taking this medicine. Do not take your medicine at a time of day that will cause you problems. Do not take at bedtime.  Talk to your pediatrician regarding the use of this medicine in children. While this drug may be prescribed for selected conditions, precautions do apply.  Overdosage: If you think you have taken too much of this medicine contact a poison control center or emergency room at once.  NOTE: This medicine is only for you. Do not share this medicine with others.  What if I miss a dose?  If you miss a dose, take it as soon as you can. If it is almost time for your next dose, take only that dose. Do not take double or extra doses.  What may interact with this medicine?  -aspirin and aspirin-like medicines  -certain antibiotics  -chloral hydrate  -cisplatin  -cyclosporine  -digoxin  -diuretics  -laxatives  -lithium  -medicines for blood pressure  -medicines that relax muscles for surgery  -methotrexate  -NSAIDs, medicines for pain and inflammation like ibuprofen, naproxen, or indomethacin  -phenytoin  -steroid medicines like prednisone or cortisone  -sucralfate  -thyroid hormones  This list may not describe all possible interactions. Give your health care provider a list of all the medicines, herbs, non-prescription drugs, or dietary supplements you use. Also tell them if you smoke, drink alcohol, or use illegal drugs. Some items may interact with your medicine.  What should I watch for while using this medicine?  Visit your doctor or health care professional for regular checks on your progress. Check your blood pressure regularly. Ask your doctor or health care professional what your blood pressure should be, and when you should contact him or her. If you are a  diabetic, check your blood sugar as directed.  You may need to be on a special diet while taking this medicine. Check with your doctor. Also, ask how many glasses of fluid you need to drink a day. You must not get dehydrated.  You may get drowsy or dizzy. Do not drive, use machinery, or do anything that needs mental alertness until you know how this drug affects you. Do not stand or sit up quickly, especially if you are an older patient. This reduces the risk of dizzy or fainting spells. Alcohol can make you more drowsy and dizzy. Avoid alcoholic drinks.  This medicine can make you more sensitive to the sun. Keep out of the sun. If you cannot avoid being in the sun, wear protective clothing and use sunscreen. Do not use sun lamps or tanning beds/booths.  What side effects may I notice from receiving this medicine?  Side effects that you should report to your doctor or health care professional as soon as possible:  -blood in urine or stools  -dry mouth  -fever or chills  -hearing loss or ringing in the ears  -irregular heartbeat  -muscle pain or weakness, cramps  -skin rash  -stomach upset, pain, or nausea  -tingling or numbness in the hands or feet  -unusually weak or tired  -vomiting or diarrhea  -yellowing of the eyes or skin  Side effects that usually do not require medical attention (report to your doctor or health care professional if they continue or are bothersome):  -headache  -loss of appetite  -unusual bleeding or bruising  This list may not describe all possible side effects. Call your doctor for medical advice about side effects. You may report side effects to FDA at 6-027-FDA-2186.  Where should I keep my medicine?  Keep out of the reach of children.  Store at room temperature between 15 and 30 degrees C (59 and 86 degrees F). Protect from light. Throw away any unused medicine after the expiration date.  NOTE: This sheet is a summary. It may not cover all possible information. If you have questions  about this medicine, talk to your doctor, pharmacist, or health care provider.     © 2016, [employer]. (2016-03-09 13:49:50)          Albuterol; Ipratropium inhalation aerosol  What is this medicine?  ALBUTEROL; IPRATROPIUM (al BYOO ter ole; i pra AKSHAT millse um) has two bronchodilators. It helps open up the airways in your lungs to make it easier to breathe.This medicine is used to treat chronic obstructive pulmonary disease (COPD).  This medicine may be used for other purposes; ask your health care provider or pharmacist if you have questions.  What should I tell my health care provider before I take this medicine?  They need to know if you have any of the following conditions:  -heart disease  -high blood pressure  -irregular heartbeat  -an unusual or allergic reaction to albuterol, ipratropium, atropine, soya protein, soybeans or peanuts, other medicines, foods, dyes, or preservatives  -pregnant or trying to get pregnant  -breast-feeding  How should I use this medicine?  This medicine is for inhalation only. Follow the instructions on your prescription label. Do not use more often than directed. Make sure that you are using your inhaler correctly. Ask you doctor or health care provider if you have any questions.  Talk to your pediatrician regarding the use of this medicine in children. Special care may be needed.  Overdosage: If you think you have taken too much of this medicine contact a poison control center or emergency room at once.  NOTE: This medicine is only for you. Do not share this medicine with others.  What if I miss a dose?  If you miss a dose, use it as soon as you can. If it is almost time for your next dose, use only that dose. Do not use double or extra doses.  What may interact with this medicine?  Do not take this medicine with any of the following medications:  -MAOIs like Carbex, Eldepryl, Marplan, Nardil, and Parnate  This medicine may also interact with the following  medications:  -diuretics  -medicines for depression, anxiety, or psychotic disturbances  -medicines for irregular heartbeat  -medicines for weight loss including some herbal products  -methadone  -pimozide  -sertindole  -some medicines for blood pressure or the heart  This list may not describe all possible interactions. Give your health care provider a list of all the medicines, herbs, non-prescription drugs, or dietary supplements you use. Also tell them if you smoke, drink alcohol, or use illegal drugs. Some items may interact with your medicine.  What should I watch for while using this medicine?  Tell your doctor or health care professional if your symptoms do not improve. If your breathing gets worse while you are using this medicine, call your doctor right away. Do not stop using your medicine unless your doctor tells you to.  Your mouth may get dry. Chewing sugarless gum or sucking hard candy, and drinking plenty of water may help. Contact your doctor if the problem does not go away or is severe.  You may get dizzy or have blurred vision. Do not drive, use machinery, or do anything that needs mental alertness until you know how this medicine affects you. Do not stand or sit up quickly, especially if you are an older patient. This reduces the risk of dizzy or fainting spells.  What side effects may I notice from receiving this medicine?  Side effects that you should report to your doctor or health care professional as soon as possible:  -allergic reactions like skin rash, itching or hives, swelling of the face, lips, or tongue  -breathing problems  -feeling faint or lightheaded, falls  -fever  -high blood pressure  -irregular heartbeat or chest pain  -muscle cramps or weakness  -pain, tingling, numbness in the hands or feet  -vomiting  Side effects that usually do not require medical attention (report to your doctor or health care professional if they continue or are bothersome):  -blurred  vision  -cough  -difficulty passing urine  -difficulty sleeping  -headache  -nervousness or trembling  -stuffy or runny nose  -unusual taste  -upset stomach  This list may not describe all possible side effects. Call your doctor for medical advice about side effects. You may report side effects to FDA at 3-033-FDA-1405.  Where should I keep my medicine?  Keep out of the reach of children.  Store at a room temperature between 15 and 30 degrees C (59 and 86 degrees F). Do not freeze. This medicine does not work as well if it is too cold. Protect from humidity. Never throw the container into a fire or incinerator. Keep track of the number of sprays used and discard after 200 sprays. Throw away any unused medicine after the expiration date.  NOTE: This sheet is a summary. It may not cover all possible information. If you have questions about this medicine, talk to your doctor, pharmacist, or health care provider.     © 2016, [employer]. (2014-05-30 08:58:39)          Prednisone tablets  What is this medicine?  PREDNISONE (PRED ni sone) is a corticosteroid. It is commonly used to treat inflammation of the skin, joints, lungs, and other organs. Common conditions treated include asthma, allergies, and arthritis. It is also used for other conditions, such as blood disorders and diseases of the adrenal glands.  This medicine may be used for other purposes; ask your health care provider or pharmacist if you have questions.  What should I tell my health care provider before I take this medicine?  They need to know if you have any of these conditions:  -Cushing's syndrome  -diabetes  -glaucoma  -heart disease  -high blood pressure  -infection (especially a virus infection such as chickenpox, cold sores, or herpes)  -kidney disease  -liver disease  -mental illness  -myasthenia gravis  -osteoporosis  -seizures  -stomach or intestine problems  -thyroid disease  -an unusual or allergic reaction to lactose, prednisone,  other medicines, foods, dyes, or preservatives  -pregnant or trying to get pregnant  -breast-feeding  How should I use this medicine?  Take this medicine by mouth with a glass of water. Follow the directions on the prescription label. Take this medicine with food. If you are taking this medicine once a day, take it in the morning. Do not take more medicine than you are told to take. Do not suddenly stop taking your medicine because you may develop a severe reaction. Your doctor will tell you how much medicine to take. If your doctor wants you to stop the medicine, the dose may be slowly lowered over time to avoid any side effects.  Talk to your pediatrician regarding the use of this medicine in children. Special care may be needed.  Overdosage: If you think you have taken too much of this medicine contact a poison control center or emergency room at once.  NOTE: This medicine is only for you. Do not share this medicine with others.  What if I miss a dose?  If you miss a dose, take it as soon as you can. If it is almost time for your next dose, talk to your doctor or health care professional. You may need to miss a dose or take an extra dose. Do not take double or extra doses without advice.  What may interact with this medicine?  Do not take this medicine with any of the following medications:  -metyrapone  -mifepristone  This medicine may also interact with the following medications:  -aminoglutethimide  -amphotericin B  -aspirin and aspirin-like medicines  -barbiturates  -certain medicines for diabetes, like glipizide or glyburide  -cholestyramine  -cholinesterase inhibitors  -cyclosporine  -digoxin  -diuretics  -ephedrine  -female hormones, like estrogens and birth control pills  -isoniazid  -ketoconazole  -NSAIDS, medicines for pain and inflammation, like ibuprofen or naproxen  -phenytoin  -rifampin  -toxoids  -vaccines  -warfarin  This list may not describe all possible interactions. Give your health care  provider a list of all the medicines, herbs, non-prescription drugs, or dietary supplements you use. Also tell them if you smoke, drink alcohol, or use illegal drugs. Some items may interact with your medicine.  What should I watch for while using this medicine?  Visit your doctor or health care professional for regular checks on your progress. If you are taking this medicine over a prolonged period, carry an identification card with your name and address, the type and dose of your medicine, and your doctor's name and address.  This medicine may increase your risk of getting an infection. Tell your doctor or health care professional if you are around anyone with measles or chickenpox, or if you develop sores or blisters that do not heal properly.  If you are going to have surgery, tell your doctor or health care professional that you have taken this medicine within the last twelve months.  Ask your doctor or health care professional about your diet. You may need to lower the amount of salt you eat.  This medicine may affect blood sugar levels. If you have diabetes, check with your doctor or health care professional before you change your diet or the dose of your diabetic medicine.  What side effects may I notice from receiving this medicine?  Side effects that you should report to your doctor or health care professional as soon as possible:  -allergic reactions like skin rash, itching or hives, swelling of the face, lips, or tongue  -changes in emotions or moods  -changes in vision  -depressed mood  -eye pain  -fever or chills, cough, sore throat, pain or difficulty passing urine  -increased thirst  -swelling of ankles, feet  Side effects that usually do not require medical attention (report to your doctor or health care professional if they continue or are bothersome):  -confusion, excitement, restlessness  -headache  -nausea, vomiting  -skin problems, acne, thin and shiny skin  -trouble sleeping  -weight  gain  This list may not describe all possible side effects. Call your doctor for medical advice about side effects. You may report side effects to FDA at 4-242-GKG-0694.  Where should I keep my medicine?  Keep out of the reach of children.  Store at room temperature between 15 and 30 degrees C (59 and 86 degrees F). Protect from light. Keep container tightly closed. Throw away any unused medicine after the expiration date.  NOTE: This sheet is a summary. It may not cover all possible information. If you have questions about this medicine, talk to your doctor, pharmacist, or health care provider.     © 2016, [employer]. (2012-08-02 10:57:14)            SYMPTOMS OF A STROKE    Call 911 or have someone take you to the Emergency Department if you have any of the following:    · Sudden numbness or weakness of your face, arm or leg especially on one side of the body  · Sudden confusion, diffiiculty speaking or trouble understanding   · Changes in your vision or loss of sight in one eye  · Sudden severe headache with no known cause  · sudden dizziness, trouble walking, loss of balance or coordination    It is important to seek emergency care right away if you have further stroke symptoms. If you get emergency help quickly, the powerful clot-dissolving medicines can reduce the disabilities caused by a stroke.     For more information:    American Stroke Association  8-424-5-STROKE  www.strokeassociation.org           IF YOU SMOKE OR USE TOBACCO PLEASE READ THE FOLLOWING:    Why is smoking bad for me?  Smoking increases the risk of heart disease, lung disease, vascular disease, stroke, and cancer.     If you smoke, STOP!    If you would like more information on quitting smoking, please visit the [employer] website: [email or web address]   This link will provide additional resources including the QUIT line and the Beat the Pack support groups.     For more  information:    American Cancer Society  ([phone]    American Heart Association  1-478.811.5555               YOU ARE THE MOST IMPORTANT FACTOR IN YOUR RECOVERY.     Follow all instructions carefully.     I have reviewed my discharge instructions with my nurse, including the following information, if applicable:     Information about my illness and diagnosis   Follow up appointments (including lab draws)   Wound Care   Equipment Needs   Medications (new and continuing) along with side effects   Preventative information such as vaccines and smoking cessations   Diet   Pain   I know when to contact my Doctor's office or seek emergency care      I want my nurse to describe the side effects of my medications: YES NO   If the answer is no, I understand the side effects of my medications: YES NO   My nurse described the side effects of my medications in a way that I could understand: YES NO   I have taken my personal belongings and my own medications with me at discharge: YES NO            I have received this information and my questions have been answered. I have discussed any concerns I see with this plan with the nurse or physician. I understand these instructions.    Signature of Patient or Responsible Person: _____________________________________    Date: _________________  Time: __________________    Signature of Healthcare Provider: _______________________________________  Date: _________________  Time: __________________

## 2017-01-07 LAB
ANION GAP SERPL CALCULATED.3IONS-SCNC: 11.4 MMOL/L
BUN BLD-MCNC: 7 MG/DL (ref 8–23)
BUN/CREAT SERPL: 13.5 (ref 7–25)
CALCIUM SPEC-SCNC: 8.8 MG/DL (ref 8.6–10.5)
CHLORIDE SERPL-SCNC: 93 MMOL/L (ref 98–107)
CO2 SERPL-SCNC: 25.6 MMOL/L (ref 22–29)
CREAT BLD-MCNC: 0.52 MG/DL (ref 0.57–1)
GFR SERPL CREATININE-BSD FRML MDRD: 112 ML/MIN/1.73
GLUCOSE BLD-MCNC: 162 MG/DL (ref 65–99)
POTASSIUM BLD-SCNC: 4.5 MMOL/L (ref 3.5–5.2)
PROCALCITONIN SERPL-MCNC: 0.04 NG/ML (ref 0.1–0.25)
SODIUM BLD-SCNC: 130 MMOL/L (ref 136–145)

## 2017-01-07 PROCEDURE — 80048 BASIC METABOLIC PNL TOTAL CA: CPT | Performed by: INTERNAL MEDICINE

## 2017-01-07 PROCEDURE — 84145 PROCALCITONIN (PCT): CPT | Performed by: INTERNAL MEDICINE

## 2017-01-07 PROCEDURE — 94668 MNPJ CHEST WALL SBSQ: CPT

## 2017-01-07 PROCEDURE — 94640 AIRWAY INHALATION TREATMENT: CPT

## 2017-01-07 PROCEDURE — 94799 UNLISTED PULMONARY SVC/PX: CPT

## 2017-01-07 PROCEDURE — 94667 MNPJ CHEST WALL 1ST: CPT

## 2017-01-07 PROCEDURE — 63710000001 PREDNISONE PER 5 MG: Performed by: INTERNAL MEDICINE

## 2017-01-07 PROCEDURE — 25010000002 FUROSEMIDE PER 20 MG: Performed by: INTERNAL MEDICINE

## 2017-01-07 RX ORDER — PANTOPRAZOLE SODIUM 40 MG/1
40 TABLET, DELAYED RELEASE ORAL
Status: DISCONTINUED | OUTPATIENT
Start: 2017-01-07 | End: 2017-01-10 | Stop reason: HOSPADM

## 2017-01-07 RX ORDER — FUROSEMIDE 10 MG/ML
40 INJECTION INTRAMUSCULAR; INTRAVENOUS ONCE
Status: COMPLETED | OUTPATIENT
Start: 2017-01-07 | End: 2017-01-07

## 2017-01-07 RX ADMIN — VORICONAZOLE 200 MG: 200 TABLET, FILM COATED ORAL at 08:18

## 2017-01-07 RX ADMIN — BUDESONIDE AND FORMOTEROL FUMARATE DIHYDRATE 2 PUFF: 160; 4.5 AEROSOL RESPIRATORY (INHALATION) at 20:41

## 2017-01-07 RX ADMIN — GUAIFENESIN 1200 MG: 600 TABLET, EXTENDED RELEASE ORAL at 17:35

## 2017-01-07 RX ADMIN — FUROSEMIDE 40 MG: 10 INJECTION, SOLUTION INTRAMUSCULAR; INTRAVENOUS at 20:10

## 2017-01-07 RX ADMIN — DILTIAZEM HYDROCHLORIDE 180 MG: 180 CAPSULE, COATED, EXTENDED RELEASE ORAL at 08:18

## 2017-01-07 RX ADMIN — VORICONAZOLE 200 MG: 200 TABLET, FILM COATED ORAL at 01:45

## 2017-01-07 RX ADMIN — IPRATROPIUM BROMIDE AND ALBUTEROL SULFATE 3 ML: .5; 3 SOLUTION RESPIRATORY (INHALATION) at 20:41

## 2017-01-07 RX ADMIN — GUAIFENESIN 1200 MG: 600 TABLET, EXTENDED RELEASE ORAL at 01:45

## 2017-01-07 RX ADMIN — GUAIFENESIN 1200 MG: 600 TABLET, EXTENDED RELEASE ORAL at 08:18

## 2017-01-07 RX ADMIN — LOSARTAN POTASSIUM 50 MG: 50 TABLET, FILM COATED ORAL at 08:18

## 2017-01-07 RX ADMIN — BUDESONIDE AND FORMOTEROL FUMARATE DIHYDRATE 2 PUFF: 160; 4.5 AEROSOL RESPIRATORY (INHALATION) at 08:50

## 2017-01-07 RX ADMIN — FLUTICASONE PROPIONATE 2 SPRAY: 50 SPRAY, METERED NASAL at 08:18

## 2017-01-07 RX ADMIN — DABIGATRAN ETEXILATE MESYLATE 150 MG: 150 CAPSULE ORAL at 08:18

## 2017-01-07 RX ADMIN — DABIGATRAN ETEXILATE MESYLATE 150 MG: 150 CAPSULE ORAL at 20:07

## 2017-01-07 RX ADMIN — PREDNISONE 20 MG: 20 TABLET ORAL at 08:18

## 2017-01-07 RX ADMIN — IPRATROPIUM BROMIDE AND ALBUTEROL SULFATE 3 ML: .5; 3 SOLUTION RESPIRATORY (INHALATION) at 08:50

## 2017-01-07 RX ADMIN — SODIUM CHLORIDE SOLN NEBU 7% 4 ML: 7 NEBU SOLN at 12:25

## 2017-01-07 RX ADMIN — IPRATROPIUM BROMIDE AND ALBUTEROL SULFATE 3 ML: .5; 3 SOLUTION RESPIRATORY (INHALATION) at 12:20

## 2017-01-07 RX ADMIN — LOSARTAN POTASSIUM 50 MG: 50 TABLET, FILM COATED ORAL at 17:35

## 2017-01-07 RX ADMIN — ATORVASTATIN CALCIUM 40 MG: 40 TABLET, FILM COATED ORAL at 08:18

## 2017-01-07 RX ADMIN — PANTOPRAZOLE SODIUM 40 MG: 40 TABLET, DELAYED RELEASE ORAL at 13:46

## 2017-01-07 RX ADMIN — FUROSEMIDE 40 MG: 40 TABLET ORAL at 08:18

## 2017-01-07 RX ADMIN — IPRATROPIUM BROMIDE AND ALBUTEROL SULFATE 3 ML: .5; 3 SOLUTION RESPIRATORY (INHALATION) at 03:53

## 2017-01-07 RX ADMIN — IPRATROPIUM BROMIDE AND ALBUTEROL SULFATE 3 ML: .5; 3 SOLUTION RESPIRATORY (INHALATION) at 16:35

## 2017-01-07 RX ADMIN — VORICONAZOLE 200 MG: 200 TABLET, FILM COATED ORAL at 20:07

## 2017-01-07 NOTE — ED PROVIDER NOTES
EMERGENCY DEPARTMENT ENCOUNTER    CHIEF COMPLAINT  Chief Complaint: SOA  History given by: pt and family  History limited by: none  Room Number: 525/1  PMD: Rogelio Tucker MD      HPI:  Pt is a 86 y.o. female who presents complaining of SOA onset this morning. She reports O2 SAT was 75% on RA at home and she is not on home O2. SOA worse when coughing. She was dx with bacterial and fungal pneumonia and discharged 3 days ago but did not receive her abx.     Duration:  One day   Onset: gradual   Timing: constant   Location: n/a  Radiation: n/a  Quality: RA SAT 75% when measured at home   Intensity/Severity: moderate   Progression:worsening   Associated Symptoms: none  Aggravating Factors: cough  Alleviating Factors: none  Previous Episodes: Recently dx with bacterial and fungal pna   Treatment before arrival: anti fungal     PAST MEDICAL HISTORY  Active Ambulatory Problems     Diagnosis Date Noted   • Pneumothorax of right lung after biopsy 11/12/2016   • Pulmonary aspergillosis 11/16/2016   • Benign essential hypertension 12/01/2016   • Chronic coronary artery disease 12/01/2016   • Diastolic dysfunction 12/01/2016   • Hyperlipidemia 12/01/2016   • Mitral valve insufficiency 12/01/2016   • Ventricular premature beats 12/01/2016   • Ventricular tachycardia 12/01/2016   • Persistent atrial fibrillation 12/01/2016   • Pneumonia 01/01/2017     Resolved Ambulatory Problems     Diagnosis Date Noted   • No Resolved Ambulatory Problems     Past Medical History   Diagnosis Date   • A-fib    • Abdominal distension    • Acute hypoxemic respiratory failure    • Asthma    • Atrial fibrillation    • Bigeminy    • Bronchiectasis    • Chest tightness    • Colitis    • Colitis    • Cough    • Dyspnea    • History of pneumonia    • Hypertension    • Hypoxia    • Infectious viral hepatitis    • Lesion of lung    • SOB (shortness of breath)    • Wheeze        PAST SURGICAL HISTORY  Past Surgical History   Procedure Laterality Date   •  Hysterectomy     • D&c with suction     • Cataract extraction extracapsular w/ intraocular lens implantation     • Knee arthroscopy Left    • Colonoscopy       2013   • Bronchoscopy N/A 11/12/2016     Procedure: BRONCHOSCOPY WITH FLUORO, BRUSHINGS, BAL, AND BIOPSIES;  Surgeon: Rogelio Tucker MD;  Location: [facility] ENDOSCOPY;  Service:        FAMILY HISTORY  Family History   Problem Relation Age of Onset   • Hypertension Mother    • Hypertension Father    • Stroke Father    • Cancer Son        SOCIAL HISTORY  Social History     Social History   • Marital status:      Spouse name: N/A   • Number of children: N/A   • Years of education: N/A     Occupational History   • Not on file.     Social History Main Topics   • Smoking status: Never Smoker   • Smokeless tobacco: Never Used   • Alcohol use No   • Drug use: No   • Sexual activity: Yes     Partners: Male     Other Topics Concern   • Not on file     Social History Narrative       ALLERGIES  Erythromycin; Levaquin [levofloxacin]; and Macrobid [nitrofurantoin]    REVIEW OF SYSTEMS  Review of Systems   Constitutional: Negative for fever.   HENT: Negative for sore throat.    Eyes: Negative.    Respiratory: Positive for cough and shortness of breath.    Cardiovascular: Negative for chest pain.   Gastrointestinal: Negative for abdominal pain, diarrhea and vomiting.   Genitourinary: Negative for dysuria.   Musculoskeletal: Negative for neck pain.   Skin: Negative for rash.   Allergic/Immunologic: Negative.    Neurological: Negative for weakness, numbness and headaches.   Hematological: Negative.    Psychiatric/Behavioral: Negative.    All other systems reviewed and are negative.      PHYSICAL EXAM  ED Triage Vitals   Temp Heart Rate Resp BP SpO2   01/06/17 1828 01/06/17 1828 01/06/17 1828 01/06/17 1828 01/06/17 1828   97.3 °F (36.3 °C) 98 20 137/80 95 %      Temp src Heart Rate Source Patient Position BP Location FiO2 (%)   -- -- -- -- --              Physical Exam    Constitutional: She is oriented to person, place, and time. She appears distressed.   HENT:   Head: Normocephalic and atraumatic.   Eyes: EOM are normal. Pupils are equal, round, and reactive to light.   Neck: Normal range of motion. Neck supple.   Cardiovascular: Regular rhythm and normal heart sounds.  Tachycardia present.    No murmur heard.  Pulmonary/Chest: Accessory muscle usage present. She is in respiratory distress (moderate). She has decreased breath sounds. She has wheezes. She has rhonchi.   Speaks in short phrases    Abdominal: Soft. There is no tenderness. There is no rebound and no guarding.   Musculoskeletal: Normal range of motion. She exhibits no edema.   Neurological: She is alert and oriented to person, place, and time. She has normal sensation and normal strength.   Skin: Skin is warm and dry. No rash noted.   Psychiatric: Mood and affect normal.   Nursing note and vitals reviewed.      LAB RESULTS  Lab Results (last 24 hours)     Procedure Component Value Units Date/Time    CBC & Differential [[id number]] Collected:  01/06/17 1857    Specimen:  Blood Updated:  01/06/17 2001    Narrative:       The following orders were created for panel order CBC & Differential.  Procedure                               Abnormality         Status                     ---------                               -----------         ------                     Scan Slide[[id number]]                                        Final result               CBC Auto Differential[[id number]]         Abnormal            Final result                 Please view results for these tests on the individual orders.    Comprehensive Metabolic Panel [[id number]]  (Abnormal) Collected:  01/06/17 1857    Specimen:  Blood Updated:  01/06/17 1943     Glucose 153 (H) mg/dL      BUN 9 mg/dL      Creatinine 0.53 (L) mg/dL      Sodium 127 (L) mmol/L      Potassium 4.1 mmol/L      Chloride 91 (L) mmol/L      CO2 25.5 mmol/L      Calcium 9.0 mg/dL       Total Protein 6.6 g/dL      Albumin 3.90 g/dL      ALT (SGPT) 16 U/L      AST (SGOT) 21 U/L      Alkaline Phosphatase 90 U/L      Total Bilirubin 0.4 mg/dL      eGFR Non African Amer 109 mL/min/1.73      Globulin 2.7 gm/dL      A/G Ratio 1.4 g/dL      BUN/Creatinine Ratio 17.0      Anion Gap 10.5 mmol/L     Narrative:       The MDRD GFR formula is only valid for adults with stable renal function between ages 18 and 70.    BNP [[id number]]  (Abnormal) Collected:  01/06/17 1857    Specimen:  Blood Updated:  01/06/17 1940     proBNP 2028.0 (H) pg/mL     Narrative:       Among patients with dyspnea, NT-proBNP is highly sensitive for the detection of acute congestive heart failure. In addition NT-proBNP of <300 pg/ml effectively rules out acute congestive heart failure with 99% negative predictive value.    Troponin [[id number]]  (Normal) Collected:  01/06/17 1857    Specimen:  Blood Updated:  01/06/17 1943     Troponin T <0.010 ng/mL     Narrative:       Troponin T Reference Ranges:  Less than 0.03 ng/mL:    Negative for AMI  0.03 to 0.09 ng/mL:      Indeterminant for AMI  Greater than 0.09 ng/mL: Positive for AMI    CBC Auto Differential [[id number]]  (Abnormal) Collected:  01/06/17 1857    Specimen:  Blood Updated:  01/06/17 2001     WBC 5.31 10*3/mm3      RBC 3.11 (L) 10*6/mm3      Hemoglobin 10.1 (L) g/dL      Hematocrit 30.0 (L) %      MCV 96.5 fL      MCH 32.5 (H) pg      MCHC 33.7 g/dL      RDW 15.2 (H) %      RDW-SD 53.6 fl      MPV 9.7 fL      Platelets 235 10*3/mm3      Neutrophil % 87.0 (H) %      Lymphocyte % 6.0 (L) %      Monocyte % 6.6 %      Eosinophil % 0.0 (L) %      Basophil % 0.0 %      Immature Grans % 0.4 %      Neutrophils, Absolute 4.62 10*3/mm3      Lymphocytes, Absolute 0.32 (L) 10*3/mm3      Monocytes, Absolute 0.35 10*3/mm3      Eosinophils, Absolute 0.00 10*3/mm3      Basophils, Absolute 0.00 10*3/mm3      Immature Grans, Absolute 0.02 10*3/mm3      nRBC 0.0 /100 WBC     Scan Slide [[id number]]  "Collected:  01/06/17 1857    Specimen:  Blood Updated:  01/06/17 2001     Rouleaux Large/3+      WBC Morphology Normal      Platelet Morphology Normal     Procalcitonin [[id number]]  (Abnormal) Collected:  01/06/17 1857    Specimen:  Blood Updated:  01/06/17 2130     Procalcitonin 0.04 (L) ng/mL     Narrative:       As a Marker for Sepsis (Non-Neonates):   1. <0.5 ng/mL represents a low risk of severe sepsis and/or septic shock.  1. >2 ng/mL represents a high risk of severe sepsis and/or septic shock.    As a Marker for Lower Respiratory Tract Infections that require antibiotic therapy:  PCT on Admission     Antibiotic Therapy             6-12 Hrs later  > 0.5                Strongly Recommended            >0.25 - <0.5         Recommended  0.1 - 0.25           Discouraged                   Remeasure/reassess PCT  <0.1                 Strongly Discouraged          Remeasure/reassess PCT      As 28 day mortality risk marker: \"Change in Procalcitonin Result\" (> 80 % or <=80 %) if Day 0 (or Day 1) and Day 4 values are available. Refer to http://www.[employer]-pct-calculator.com/   Change in PCT <=80 %   A decrease of PCT levels below or equal to 80 % defines a positive change in PCT test result representing a higher risk for 28-day all-cause mortality of patients diagnosed with severe sepsis or septic shock.  Change in PCT > 80 %   A decrease of PCT levels of more than 80 % defines a negative change in PCT result representing a lower risk for 28-day all-cause mortality of patients diagnosed with severe sepsis or septic shock.                Blood Culture [[id number]] Collected:  01/06/17 2120    Specimen:  Blood from Arm, Left Updated:  01/06/17 2124          I ordered the above labs and reviewed the results    RADIOLOGY  XR Chest 2 View   Final Result   Previously seen infiltrate in the right upper and right   middle lobes appears nearly completely resolved but there are new areas   of opacity in both lower lobes, suspicious " for new pneumonia or   atelectasis in these areas.       This report was finalized on 1/6/2017 9:12 PM by Dr. Geovanny Pollard MD.               I ordered the above noted radiological studies. Interpreted by radiologist. Discussed with radiologist ). Reviewed by me in PACS.       PROCEDURES  Procedures      PROGRESS AND CONSULTS  ED Course     08:50 PM Ordered duo neb for SOA. Placed call to pulmonary for admission.     08:56 PM DIscussed case with Dr. Ann (pulmonary) no abx now he will emmanuelle at chart and fug appr ttreat Will give steriods and breathing treatments in ED.  MEDICAL DECISION MAKING  Results were reviewed/discussed with the patient and they were also made aware of online access. Pt also made aware that some labs, such as cultures, will not be resulted during ER visit and follow up with PMD is necessary.     MDM  Number of Diagnoses or Management Options     Amount and/or Complexity of Data Reviewed  Clinical lab tests: reviewed  Tests in the radiology section of CPT®: reviewed  Decide to obtain previous medical records or to obtain history from someone other than the patient: yes  Obtain history from someone other than the patient: yes (Pt's family)  Review and summarize past medical records: yes (She was discharge three days ago by Dr. Camilo. She was dx with bacterial and fungal pna. From reading her discharge summary it looks as though she should be taking Bactrim and an anti fungal but did not receive and has not been taking the abx.  )           DIAGNOSIS  Final diagnoses:   Pneumonia with the fungal infection aspergillosis   Acute respiratory failure with hypoxia       DISPOSITION  ADMISSION    Discussed treatment plan and reason for admission with pt/family and admitting physician.  Pt/family voiced understanding of the plan for admission for further testing/treatment as needed.         Latest Documented Vital Signs:  As of 12:29 AM  BP- 141/93 HR- 92 Temp- 97.6 °F (36.4 °C) (Oral) O2 sat-  93%    --  Documentation assistance provided by danii Mcqueen for Dr. Gagnon.  Information recorded by the scribe was done at my direction and has been verified and validated by me.     Yary Mcqueen  01/06/17 2128       Rafael Gagnon MD  01/07/17 0029

## 2017-01-07 NOTE — H&P
Patient Care Team:  Rogelio Tucker MD as PCP - General (Pulmonary Disease)  Gabe Horne MD as PCP - Family Medicine    Chief complaint shortness of breath    Subjective     Patient is a 86 y.o. female who presented to the emergency room with shortness of breath that developed today.  She noticed she was getting weak and tired when she was folding some cloths she has a pulse oximeter at home she checked and her saturations were 75% and it took her a long time to recover and she still could barely get up to 88-90% range..  She has had increased chest congestion.  No fevers chills sweats or throat runny nose nausea vomiting diarrhea or chest pain she has had lower extremity edema they swell while she was in the hospital earlier in the week they have not gone down.  Patient was admitted earlier in the week with the symptoms and radiograph suggestive of a viral pneumonia and indeed was positive for influenza.  She also has bronchiectasis and aspergillosis and has been on Vfend for several weeks.  The etiology of her lung disease is unknown she did have pretty severe pneumonia which she was a child but didn't have any symptoms until the last 10 years she was never smoker had no significant occupational or avocational exposures.     Review of Systems  Constitution: She's not aware of any recent weight loss  Eyes: No acute visual changes  ENT: No sore throat or runny nose no difficulty swallowing  Respiratory: As above  Cardiovascular: She has chronic atrial fibrillation and had a mildly elevated proBNP on her recent admission.  She has hypertension  Gastrointestinal: No nausea vomiting or diarrhea no history of liver disease or hepatitis recent melena or hematochezia  Genitourinary: No history of kidney disease no dysuria hematuria and urgency or frequency  Integument: No skin rashes lumps bumps or nodules  Hematologic/Lymphatic: History of blood clots are really any easy bleeding she is on a blood thinner for  her atrial fibrillation  Musculoskeletal: Mild arthritis  Neurological:No Seizure strokes no recent headaches  Behavioral/Psych: Nonsmoker nondrinker no illicit drug use  Endocrine: No diabetes or hypothyroidism no polyuria or polydipsia or cold intolerance  Allergies/Immunologic: He has allergies to molds but no medication allergies although a number of antibiotics cause GI upset as does codeine    History  Past Medical History   Diagnosis Date   • A-fib    • Abdominal distension    • Acute hypoxemic respiratory failure    • Asthma    • Atrial fibrillation    • Bigeminy    • Bronchiectasis    • Chest tightness    • Colitis    • Colitis    • Cough    • Dyspnea    • History of pneumonia      MAY 2016   • Hyperlipidemia    • Hypertension    • Hypoxia    • Infectious viral hepatitis      AGE 13   • Lesion of lung    • SOB (shortness of breath)    • Wheeze      mild     Past Surgical History   Procedure Laterality Date   • Hysterectomy     • D&c with suction     • Cataract extraction extracapsular w/ intraocular lens implantation     • Knee arthroscopy Left    • Colonoscopy       2013   • Bronchoscopy N/A 11/12/2016     Procedure: BRONCHOSCOPY WITH FLUORO, BRUSHINGS, BAL, AND BIOPSIES;  Surgeon: Rogelio Tucker MD;  Location: [facility] ENDOSCOPY;  Service:      Family History   Problem Relation Age of Onset   • Hypertension Mother    • Hypertension Father    • Stroke Father    • Cancer Son      Social History     Social History   • Marital status:      Spouse name: N/A   • Number of children: N/A   • Years of education: N/A     Social History Main Topics   • Smoking status: Never Smoker   • Smokeless tobacco: Never Used   • Alcohol use No   • Drug use: No   • Sexual activity: Yes     Partners: Male     Other Topics Concern   • None     Social History Narrative       Allergies:  Erythromycin; Levaquin [levofloxacin]; and Macrobid [nitrofurantoin]    Objective     Vital Signs  Temp:  [97.3 °F (36.3 °C)] 97.3 °F (36.3  "°C)  Heart Rate:  [82-98] 91  Resp:  [20] 20  BP: (125-139)/(79-84) 136/79  Body mass index is 23.03 kg/(m^2).  No intake or output data in the 24 hours ending 01/06/17 2224       Flowsheet Rows         First Filed Value    Admission Height  63\" (160 cm) Documented at 01/06/2017 1828    Admission Weight  130 lb (59 kg) Documented at 01/06/2017 1828           Physical Exam:  General Appearance: Well-developed white female resting comfortably in bed she does not appear in acute distress her saturations about 90-91% on 2 L nasal cannula O2  Eyes: Conjunctiva are clear and anicteric pupils are equal and reactive to light about 3 mm.  ENT: Nasal septum midline nasal mucosa little dry oral mucous membranes are also dry no erythema or exudates  Neck: No adenopathy or thyromegaly no jugular venous distention trachea is midline  Lungs: She's got some coarse expiratory wheezes and coarse rhonchi in the bases left greater than right.  No dullness symmetric nonlabored chest expansion  Cardiac: Regular rate and rhythm no murmur or gallop  Abdomen: Soft no palpable organomegaly or masses  : Not examined  Musc/Skel: Grossly normal  Skin: No jaundice no petechiae no rashes noted  Neuro: Alert oriented cooperative following commands grossly intact  Extremities/P Vascular: No clubbing cyanosis or edema palpable radial dorsalis pedis pulses  MSE: Seems to be in pretty good spirits      Labs:  WBC No results found for: WBCS   HGB HEMOGLOBIN   Date Value Ref Range Status   01/06/2017 10.1 (L) 11.9 - 15.5 g/dL Final      HCT HEMATOCRIT   Date Value Ref Range Status   01/06/2017 30.0 (L) 35.6 - 45.5 % Final      Platlets No results found for: LABPLAT     PT/INR:    No results found for: PROTIME/No results found for: INR    Sodium SODIUM   Date Value Ref Range Status   01/06/2017 127 (L) 136 - 145 mmol/L Final      Potassium POTASSIUM   Date Value Ref Range Status   01/06/2017 4.1 3.5 - 5.2 mmol/L Final      Chloride CHLORIDE   Date " Value Ref Range Status   01/06/2017 91 (L) 98 - 107 mmol/L Final      Bicarbonate No results found for: PLASMABICARB   BUN BUN   Date Value Ref Range Status   01/06/2017 9 8 - 23 mg/dL Final      Creatinine CREATININE   Date Value Ref Range Status   01/06/2017 0.53 (L) 0.57 - 1.00 mg/dL Final      Calcium CALCIUM   Date Value Ref Range Status   01/06/2017 9.0 8.6 - 10.5 mg/dL Final      Magnesium No results found for: MG     pH No results found for: PHART   pO2 No results found for: PO2ART   pCO2 No results found for: GDW3RSB   HCO3 No results found for: ALQ2LBK       Radiographic Imaging:  Imaging Results (last 24 hours)     Procedure Component Value Units Date/Time    XR Chest 2 View [[id number]] Collected:  01/06/17 2018     Updated:  01/06/17 2115    Narrative:       PA AND LATERAL CHEST X-RAY     HISTORY: Shortness of breath, recent admission for right middle and  upper lobe pneumonia.     TECHNIQUE: PA and lateral images of the chest are correlated with CT  arteriogram chest 01/01/2017 and chest x-ray also performed that day.     FINDINGS: The previously seen infiltrate in the right upper and middle  lobes appears almost completely resolved. However, both images today  show new areas of increased opacity projecting over both lower lobes on  the PA view and over the posterior inferior lower lobe region on the  lateral view. Vascular volume is normal. There is no effusion. The  cardiac silhouette is enlarged but unchanged.       Impression:       Previously seen infiltrate in the right upper and right  middle lobes appears nearly completely resolved but there are new areas  of opacity in both lower lobes, suspicious for new pneumonia or  atelectasis in these areas.     This report was finalized on 1/6/2017 9:12 PM by Dr. Geovanny Pollard MD.             I have reviewed the chest x-ray and compared it to a study from 1/2/17 the previously noted right upper lobe infiltrate has improved there is some increase in both  lung bases sort of looks like atelectasis.  Looking at her CT scan from 1/2/17 she had bibasilar right middle and left lingular bronchiectasis some peribronchial thickening at that time    Assessment/Plan     Patient Active Problem List   Diagnosis Code   • Pneumothorax of right lung after biopsy J95.811   • Pulmonary aspergillosis B44.1   • Benign essential hypertension I10   • Chronic coronary artery disease I25.10   • Diastolic dysfunction I51.9   • Hyperlipidemia E78.5   • Mitral valve insufficiency I34.0   • Ventricular premature beats I49.3   • Ventricular tachycardia I47.2   • Persistent atrial fibrillation I48.1   • Pneumonia J18.9   • Pneumonia with the fungal infection aspergillosis B44.9         Impression #1 acute hypoxemic respiratory failure I think this is due to atelectasis primarily related to her underlying cause and administers supplemental oxygen.  #2 bibasilar atelectasis/infiltrates she has a normal white count and pro-calcitonin I am going to hold on antibiotics I will repeat a pro-calcitonin the morning just to make sure we are not catching her in early phase but her atelectasis seems to be in areas that she has significant bronchiectasis and I suspect with her diuretics and her Aspergillus and acute influenza she just was making more mucus and just insipated  those areas.  We will increase bronchodilators and hypertonic saline nebs treatments, guaifenesin , and chest physiotherapy.  And see if we can improve.  She is on a steroid taper she has his coarse expiratory wheezes at the bases is not a tight wheeze I really am hesitant to give her a whole lot of steroids.  I'm discontinuing continue 20 mg a day of prednisone for now.  #3 aspergillosis she is on Vfend we will continue this  #4 chronic atrial fibrillation she seems fairly regular on exam although on the monitor she is in A. fib we'll continue her diltiazem and anticoagulation  #5 diastolic dysfunction  #6 hyponatremia mild she had  this previously in the week we will follow her sodium, if this does not improve or worsens and we'll need to more aggressively evaluate.  #7 the media looks like her hemoglobin stable I don't know her baseline, if she's had further workup Dr. Tucker who follows her in the office will see her tomorrow  #8 bronchiectasis mild right middle lobe and left lingula which may be really has is an acute exacerbation of this.      Jaylen Ann MD  01/06/17  10:24 PM    Time:

## 2017-01-08 LAB
ANION GAP SERPL CALCULATED.3IONS-SCNC: 16.1 MMOL/L
BUN BLD-MCNC: 9 MG/DL (ref 8–23)
BUN/CREAT SERPL: 13.6 (ref 7–25)
CALCIUM SPEC-SCNC: 9.2 MG/DL (ref 8.6–10.5)
CHLORIDE SERPL-SCNC: 91 MMOL/L (ref 98–107)
CO2 SERPL-SCNC: 25.9 MMOL/L (ref 22–29)
CREAT BLD-MCNC: 0.66 MG/DL (ref 0.57–1)
DEPRECATED RDW RBC AUTO: 54.6 FL (ref 37–54)
ERYTHROCYTE [DISTWIDTH] IN BLOOD BY AUTOMATED COUNT: 15.1 % (ref 11.7–13)
GFR SERPL CREATININE-BSD FRML MDRD: 85 ML/MIN/1.73
GLUCOSE BLD-MCNC: 168 MG/DL (ref 65–99)
HCT VFR BLD AUTO: 32.3 % (ref 35.6–45.5)
HGB BLD-MCNC: 10.7 G/DL (ref 11.9–15.5)
MAGNESIUM SERPL-MCNC: 2.3 MG/DL (ref 1.6–2.4)
MCH RBC QN AUTO: 32.7 PG (ref 26.9–32)
MCHC RBC AUTO-ENTMCNC: 33.1 G/DL (ref 32.4–36.3)
MCV RBC AUTO: 98.8 FL (ref 80.5–98.2)
PLATELET # BLD AUTO: 332 10*3/MM3 (ref 140–500)
PMV BLD AUTO: 9.8 FL (ref 6–12)
POTASSIUM BLD-SCNC: 3.1 MMOL/L (ref 3.5–5.2)
RBC # BLD AUTO: 3.27 10*6/MM3 (ref 3.9–5.2)
SODIUM BLD-SCNC: 133 MMOL/L (ref 136–145)
WBC NRBC COR # BLD: 8.13 10*3/MM3 (ref 4.5–10.7)

## 2017-01-08 PROCEDURE — 94799 UNLISTED PULMONARY SVC/PX: CPT

## 2017-01-08 PROCEDURE — 94668 MNPJ CHEST WALL SBSQ: CPT

## 2017-01-08 PROCEDURE — 94620 HC PULMONARY STRESS TEST SIMPLE: CPT

## 2017-01-08 PROCEDURE — 25010000002 FUROSEMIDE PER 20 MG: Performed by: INTERNAL MEDICINE

## 2017-01-08 PROCEDURE — 83735 ASSAY OF MAGNESIUM: CPT | Performed by: INTERNAL MEDICINE

## 2017-01-08 PROCEDURE — 85027 COMPLETE CBC AUTOMATED: CPT | Performed by: INTERNAL MEDICINE

## 2017-01-08 PROCEDURE — 80048 BASIC METABOLIC PNL TOTAL CA: CPT | Performed by: INTERNAL MEDICINE

## 2017-01-08 PROCEDURE — 63710000001 PREDNISONE PER 5 MG: Performed by: INTERNAL MEDICINE

## 2017-01-08 PROCEDURE — 94640 AIRWAY INHALATION TREATMENT: CPT

## 2017-01-08 RX ORDER — POTASSIUM CHLORIDE 20MEQ/15ML
40 LIQUID (ML) ORAL EVERY 8 HOURS
Status: COMPLETED | OUTPATIENT
Start: 2017-01-08 | End: 2017-01-09

## 2017-01-08 RX ORDER — FUROSEMIDE 10 MG/ML
40 INJECTION INTRAMUSCULAR; INTRAVENOUS ONCE
Status: COMPLETED | OUTPATIENT
Start: 2017-01-08 | End: 2017-01-08

## 2017-01-08 RX ADMIN — LOSARTAN POTASSIUM 50 MG: 50 TABLET, FILM COATED ORAL at 09:04

## 2017-01-08 RX ADMIN — DABIGATRAN ETEXILATE MESYLATE 150 MG: 150 CAPSULE ORAL at 09:04

## 2017-01-08 RX ADMIN — IPRATROPIUM BROMIDE AND ALBUTEROL SULFATE 3 ML: .5; 3 SOLUTION RESPIRATORY (INHALATION) at 17:29

## 2017-01-08 RX ADMIN — DILTIAZEM HYDROCHLORIDE 180 MG: 180 CAPSULE, COATED, EXTENDED RELEASE ORAL at 09:04

## 2017-01-08 RX ADMIN — IPRATROPIUM BROMIDE AND ALBUTEROL SULFATE 3 ML: .5; 3 SOLUTION RESPIRATORY (INHALATION) at 09:01

## 2017-01-08 RX ADMIN — VORICONAZOLE 200 MG: 200 TABLET, FILM COATED ORAL at 09:04

## 2017-01-08 RX ADMIN — FLUTICASONE PROPIONATE 2 SPRAY: 50 SPRAY, METERED NASAL at 09:04

## 2017-01-08 RX ADMIN — DABIGATRAN ETEXILATE MESYLATE 150 MG: 150 CAPSULE ORAL at 20:24

## 2017-01-08 RX ADMIN — ATORVASTATIN CALCIUM 40 MG: 40 TABLET, FILM COATED ORAL at 09:04

## 2017-01-08 RX ADMIN — POTASSIUM CHLORIDE 40 MEQ: 20 SOLUTION ORAL at 18:27

## 2017-01-08 RX ADMIN — PANTOPRAZOLE SODIUM 40 MG: 40 TABLET, DELAYED RELEASE ORAL at 05:50

## 2017-01-08 RX ADMIN — GUAIFENESIN 1200 MG: 600 TABLET, EXTENDED RELEASE ORAL at 09:03

## 2017-01-08 RX ADMIN — FUROSEMIDE 40 MG: 40 TABLET ORAL at 09:04

## 2017-01-08 RX ADMIN — PREDNISONE 20 MG: 20 TABLET ORAL at 09:04

## 2017-01-08 RX ADMIN — BUDESONIDE AND FORMOTEROL FUMARATE DIHYDRATE 2 PUFF: 160; 4.5 AEROSOL RESPIRATORY (INHALATION) at 09:17

## 2017-01-08 RX ADMIN — IPRATROPIUM BROMIDE AND ALBUTEROL SULFATE 3 ML: .5; 3 SOLUTION RESPIRATORY (INHALATION) at 20:50

## 2017-01-08 RX ADMIN — VORICONAZOLE 200 MG: 200 TABLET, FILM COATED ORAL at 20:24

## 2017-01-08 RX ADMIN — BUDESONIDE AND FORMOTEROL FUMARATE DIHYDRATE 2 PUFF: 160; 4.5 AEROSOL RESPIRATORY (INHALATION) at 20:50

## 2017-01-08 RX ADMIN — LOSARTAN POTASSIUM 50 MG: 50 TABLET, FILM COATED ORAL at 17:40

## 2017-01-08 RX ADMIN — FUROSEMIDE 40 MG: 10 INJECTION, SOLUTION INTRAMUSCULAR; INTRAVENOUS at 17:40

## 2017-01-08 RX ADMIN — GUAIFENESIN 1200 MG: 600 TABLET, EXTENDED RELEASE ORAL at 17:39

## 2017-01-08 RX ADMIN — SODIUM CHLORIDE SOLN NEBU 7% 4 ML: 7 NEBU SOLN at 20:50

## 2017-01-08 RX ADMIN — IPRATROPIUM BROMIDE AND ALBUTEROL SULFATE 3 ML: .5; 3 SOLUTION RESPIRATORY (INHALATION) at 12:39

## 2017-01-08 RX ADMIN — SODIUM CHLORIDE SOLN NEBU 7% 4 ML: 7 NEBU SOLN at 09:01

## 2017-01-08 NOTE — PROGRESS NOTES
[facility]  Phone: [phone]  Rogelio Tucker MD    Subjective:  LOS: 1    No distress at present but easily SOA with any exertion without O2. Denies much cough or phlegm.    Objective   Vital Signs past 24hrs  BP range: BP: (125-144)/(68-93) 127/68  Pulse range: Heart Rate:  [] 87  Resp rate range: Resp:  [16-20] 18  Temp range: Temp (24hrs), Av.6 °F (36.4 °C), Min:97.5 °F (36.4 °C), Max:97.7 °F (36.5 °C)    O2 Device: nasal cannulaFlow (L/min):  [2] 2  Oxygen range:SpO2:  [89 %-97 %] 95 %   141 lb (64 kg); Body mass index is 24.98 kg/(m^2).    Intake/Output Summary (Last 24 hours) at 17  Last data filed at 17 1900   Gross per 24 hour   Intake    360 ml   Output      0 ml   Net    360 ml       Physical Exam   Constitutional: She is oriented to person, place, and time. She appears well-developed. Nasal cannula in place.   HENT:   Head: Normocephalic and atraumatic.   Eyes: Pupils are equal, round, and reactive to light.   Neck: Neck supple. No JVD present.   Cardiovascular: Normal rate and regular rhythm.    No murmur heard.  Pulmonary/Chest: Effort normal. No respiratory distress. She has decreased breath sounds. She has no wheezes. She has no rhonchi. She has no rales.   Abdominal: Soft. Bowel sounds are normal. She exhibits no mass. There is no tenderness.   Musculoskeletal: She exhibits no edema.   Neurological: She is alert and oriented to person, place, and time.   Skin: Skin is warm. No rash noted.   Psychiatric: She has a normal mood and affect.   Vitals reviewed.    Results Review:    I have reviewed the laboratory and imaging data since the last note by [facility] physician.  My annotations are noted in assessment and plan.    Medication Review:  I have reviewed the current MAR.  My annotations are noted in assessment and plan.       Plan   PCCM Problems  Acute hypoxic respiratory failure  Bibasilar infiltrates ? Atelectasis  Aspergillosis  +ve AFB on BAL  Recent +ve  Influenza A  Relevant Medical Diagnoses  Chronic Afib  Diastolic dysfunction  Hyponatremia  Anemia    Plan of Treatment  Will probably need O2 on discharge as a function of her recent Influenza infection complicating underlying lung issues.    Doubt new pneumonia in lungs. Agree with holding additional abx.    On Vfend for Aspergillosis. Awaiting identification of AFB seen on BAL.    Chronic Afib - will give lasix x 1. Pending ischemia eval by Dr. Robbins.    Hyponatremia better.    Anemia stable.    Rogelio Tucker MD  01/07/17  7:26 PM    EMR Dragon/Transcription disclaimer:   Some of this note may be an electronic transcription/translation of spoken language to printed text. The electronic translation of spoken language may permit erroneous, or at times, nonsensical words or phrases to be inadvertently transcribed; Although I have reviewed the note for such errors, some may still exist.

## 2017-01-08 NOTE — DISCHARGE SUMMARY
Date of Admission: 1/6/2017  Date of Discharge:  1/10/2017    Discharge Diagnosis:    Acute hypoxic respiratory failure-improved with O2  Bibasilar infiltrates ? Atelectasis  Aspergillosis  +ve AFB on BAL- await ID  Recent +ve Influenza A  Diastolic dysfunction with decompensation    Relevant Medical Diagnoses  Chronic Afib  Hyponatremia  Anemia    Presenting Problem/History of Present Illness    Patient is a 86 y.o. female who presented to the emergency room with shortness of breath that developed 1/6/17. She noticed she was getting weak and tired when she was folding some cloths she has a pulse oximeter at home she checked and her saturations were 75% and it took her a long time to recover and she still could barely get up to 88-90% range.. She has had increased chest congestion. No fevers chills sweats or throat runny nose nausea vomiting diarrhea or chest pain she has had lower extremity edema they swell while she was in the hospital earlier in the week they have not gone down. Patient was admitted earlier in the week with the symptoms and radiograph suggestive of a viral pneumonia and indeed was positive for influenza. She also has bronchiectasis and aspergillosis and has been on Vfend for several weeks. The etiology of her lung disease is unknown she did have pretty severe pneumonia when she was a child but didn't have any symptoms until the last 10 years. She was never a smoker and denies significant occupational exposure.    Hospital Course  She was started on bronchodilators and steroids. She remained on Vfend for aspergillosis. She had AFB isolated on BAL done in November and we are still awaiting final ID. She received Lasix during her stay to treat fluid overload secondary to atrial fib/diastolic dysfunction. Her shortness of breath did improve after diuretics. She is scheduled to discuss cardioversion with Dr. Robbins tomorrow and may need further ischemia work up as outpatient. She will be discharged home  with home health and supplemental O2 with exertion and sleep.    Patient was kept an extra day for cardioversion - failed. Discussed with Dr. Robbins. Adjust Lasix to 20mg daily and review in office.    Procedures Performed:  Exercise Oximetry     Patient Name:Agnieszka Rizzo    MRN: [id number]    Date: 01/08/17        ROOM AIR BASELINE    SpO2% 93    Heart Rate 88    Blood Pressure        EXERCISE ON ROOM AIR  SpO2%  EXERCISE ON O2 @ 2LPM  SpO2%    1 MINUTE  88  1 MINUTE  94    2 MINUTES    2 MINUTES  92    3 MINUTES    3 MINUTES  91    4 MINUTES    4 MINUTES  91    5 MINUTES    5 MINUTES  91    6 MINUTES    6 MINUTES  91        Distance Walked 160ft  Distance Walked 400    Dyspnea (Dago Scale) 8  Dyspnea (Dago Scale) 8    Fatigue (Dago Scale) 5  Fatigue (Dago Scale) 5    SpO2% Post Exercise   SpO2% Post Exercise 94    HR Post Exercise   HR Post Exercise 109    Time to Recovery   Time to Recovery 2       Comments: Pt ambulated on room air without assist device. SPO2 88% on room air after 1min 30 sec Pt placed on 2L and continue to ambulate x 6 min. SPO2 maintained > 90% during duration of walk. She tolerated well & return to room on 2L O2.    Pertinent test results    Basic Metabolic Panel        Ref Range & Units 4:31 AM     Glucose 65 - 99 mg/dL 83   BUN 8 - 23 mg/dL 11   Creatinine 0.57 - 1.00 mg/dL 0.52 (L)   Sodium 136 - 145 mmol/L 130 (L)   Potassium 3.5 - 5.2 mmol/L 3.8   Chloride 98 - 107 mmol/L 92 (L)   CO2 22.0 - 29.0 mmol/L 26.8   Calcium 8.6 - 10.5 mg/dL 8.8   eGFR Non African Amer >60 mL/min/1.73 112   BUN/Creatinine Ratio 7.0 - 25.0 21.2   Anion Gap mmol/L 11.2   Resulting Agency  [facility]   Narrative   The MDRD GFR formula is only valid for adults with stable renal function between ages 18 and 70.      Specimen Collected: 01/09/17  4:31 AM   Last Resulted: 01/09/17  5:52 AM              CBC (No Diff)        Ref Range & Units 1d ago     WBC 4.50 - 10.70 10*3/mm3 8.13   RBC 3.90 - 5.20 10*6/mm3 3.27 (L)    Hemoglobin 11.9 - 15.5 g/dL 10.7 (L)   Hematocrit 35.6 - 45.5 % 32.3 (L)   MCV 80.5 - 98.2 fL 98.8 (H)   MCH 26.9 - 32.0 pg 32.7 (H)   MCHC 32.4 - 36.3 g/dL 33.1   RDW 11.7 - 13.0 % 15.1 (H)   RDW-SD 37.0 - 54.0 fl 54.6 (H)   MPV 6.0 - 12.0 fL 9.8   Platelets 140 - 500 10*3/mm3 332   Resulting Agency  [facility]      Specimen Collected: 17  4:36 AM   Last Resulted: 17  5:44 AM              PCT 0.04.    IMPRESSION CXR  Previously seen infiltrate in the right upper and right  middle lobes appears nearly completely resolved but there are new areas  of opacity in both lower lobes, suspicious for new pneumonia or  atelectasis in these areas.      This report was finalized on 2017 9:12 PM by Dr. Geovanny Polalrd MD.      Consults:    Consults     Date and Time Order Name Status Description    2017 Pulmonology (on-call MD unless specified) Completed               Condition on Discharge:     Vital Signs past 24hrs  BP range: BP: (122-153)/(73-97) 123/79  Pulse range: Heart Rate:  [64-89] 85  Resp rate range: Resp:  [16] 16  Temp range: Temp (24hrs), Av.8 °F (36.6 °C), Min:97.5 °F (36.4 °C), Max:98.5 °F (36.9 °C)    O2 Device: nasal cannulaFlow (L/min):  [1-2] 2  Oxygen range:SpO2:  [93 %-100 %] 100 %   126 lb 12.8 oz (57.5 kg); Body mass index is 22.47 kg/(m^2).    Intake/Output Summary (Last 24 hours) at 01/10/17 0846  Last data filed at 01/10/17 0749   Gross per 24 hour   Intake    630 ml   Output      0 ml   Net    630 ml       Physical Exam  Constitutional: She is oriented to person, place, and time. She appears well-developed. Nasal cannula in place.   HENT:    Head: Normocephalic and atraumatic.   Eyes: Pupils are equal, round, and reactive to light.   Neck: Neck supple. No JVD present.   Cardiovascular: Normal rate and regular rhythm.    No murmur heard.  Pulmonary/Chest: Effort normal. No respiratory distress. She has decreased breath sounds. She has no wheezes. She has no rhonchi. She  has no rales.   Abdominal: Soft. Bowel sounds are normal. She exhibits no mass. There is no tenderness.   Musculoskeletal: She exhibits edema (trace - ankles).   Neurological: She is alert and oriented to person, place, and time.   Skin: Skin is warm. No rash noted.   Psychiatric: She has a normal mood and affect.   Vitals reviewed.    Discharge Disposition  Home-Health Care [facility]    Discharge Medications     Your medication list       As of 1/9/2017  4:39 PM      START taking these medications       Instructions Last Dose Given Next Dose Due    acetylcysteine 20 % nebulizer solution   Commonly known as:  MUCOMYST        Take 4 mL by nebulization 4 (Four) Times a Day.         pantoprazole 40 MG EC tablet   Commonly known as:  PROTONIX        Take 1 tablet by mouth Daily.         potassium chloride 20 MEQ CR tablet   Commonly known as:  K-DUR,KLOR-CON        Take 1 tablet by mouth Daily.           CHANGE how you take these medications       Instructions Last Dose Given Next Dose Due    furosemide 40 MG tablet   Commonly known as:  LASIX   What changed:  See the new instructions.        Take 1 tablet by mouth Daily.         ipratropium-albuterol 0.5-2.5 mg/mL nebulizer   Commonly known as:  DUO-NEB   What changed:  You were already taking a medication with the same name, and this prescription was added. Make sure you understand how and when to take each.        Take 3 mL by nebulization Every 4 (Four) Hours As Needed for wheezing or shortness of air for up to 30 days.         ipratropium-albuterol 0.5-2.5 mg/mL nebulizer   Commonly known as:  DUO-NEB   What changed:  Another medication with the same name was added. Make sure you understand how and when to take each.        Take 3 mL by nebulization 4 (Four) Times a Day.         predniSONE 10 MG tablet   Commonly known as:  DELTASONE   What changed:  additional instructions        2 tabs daily x 3 days then 1 tab daily x 3 days then stop           CONTINUE taking these  medications       Instructions Last Dose Given Next Dose Due    ADVAIR -21 MCG/ACT inhaler   Generic drug:  fluticasone-salmeterol        Inhale 2 puffs 2 (Two) Times a Day.         ALLERGY RELIEF 10 MG tablet   Generic drug:  loratadine              atorvastatin 40 MG tablet   Commonly known as:  LIPITOR              cholecalciferol 1000 UNITS tablet   Commonly known as:  VITAMIN D3              dabigatran etexilate 150 MG capsu   Commonly known as:  PRADAXA        Take 1 capsule by mouth Every 12 (Twelve) Hours.         diltiaZEM  MG 24 hr capsule   Commonly known as:  CARTIA XT        Take 1 capsule by mouth Daily.         Glucosamine-Chondroitin tablet              losartan 50 MG tablet   Commonly known as:  COZAAR              multivitamin tablet              SUPER B COMPLEX PO              VERAMYST 27.5 MCG/SPRAY nasal spray   Generic drug:  fluticasone              voriconazole 200 MG tablet   Commonly known as:  VFEND                STOP taking these medications          calcium carbonate 600 MG tablet   Commonly known as:  OS-EVIN           sulfamethoxazole-trimethoprim 800-160 MG per tablet   Commonly known as:  BACTRIM DS,SEPTRA DS                Where to Get Your Medications      These medications were sent to [facility] [address] [place] [address] [address] AT [facility] - [phone]  - [phone]   [address], [facility] [address]     Phone:  [phone]    • acetylcysteine 20 % nebulizer solution   • furosemide 40 MG tablet   • ipratropium-albuterol 0.5-2.5 mg/mL nebulizer   • ipratropium-albuterol 0.5-2.5 mg/mL nebulizer   • pantoprazole 40 MG EC tablet   • potassium chloride 20 MEQ CR tablet   • predniSONE 10 MG tablet             Discharge Diet:  Diet Order   Procedures   • Diet Regular; Cardiac, Consistent Carbohydrate       Activity at Discharge:  Independent    Follow-up Appointments  Follow-up Information      Follow up with Rogelio Tucker MD Follow up on 1/19/2017.    Specialties:  Pulmonary Disease, Sleep Medicine    Contact information:    [address] [facility]  [address]  [facility] [address]  [phone]          Follow up with Gabe Sierra MD Follow up in 1 week(s).    Specialty:  Internal Medicine    Why:  Follow up on 1/16/2016 at 11:00 a.m     Contact information:    [address]  [address]  [facility] [address]  [phone]          Follow up with Marcie Robbins MD Follow up in 2 week(s).    Specialty:  Cardiology    Why:  Follow up on 1/24/2017 at 12:00       Contact information:    [address] [facility]  [address]  [facility] [address]  [phone]          Follow up with [facility]ERR[facility] [place] [facility] [place] .    Specialty:  Home Health Services    Contact information:    [address]  [facility] [address]          Follow up with [facility] [place] .    Specialty:  Home Health Services    Contact information:    [address] [facility] [address] [facility] [address]  [facility] [id number] [phone]        Referrals and Follow-ups to Schedule     Ambulatory Referral to Home Health    As directed    Face to Face Visit Date:  1/9/2017   Follow-up Provider for Plan of Care?:  I treated the patient in an acute care facility and will not continue treatment after discharge.   Follow-up Provider:  GABE SIERRA   Reason/Clinical Findings:  acute on chronic respiratory failure and decompensated CHF   Describe mobility limitations that make leaving home difficult:  hypoxia; afib; chf   Nursing/Therapeutic Services Requested:   Physical Therapy  Skilled Nursing      Skilled nursing orders:   CHF management  Cardiopulmonary assessments      PT orders:  Strengthening   Frequency:  Other       Basic Metabolic Panel    Jan 17, 2017            Additional information on Labs and Follow-ups:      Follow up with primary care provider within 1-2 weeks after  discharge.                     Test Results Pending at Discharge   Order Current Status    Blood Culture Preliminary result    Blood Culture Preliminary result           Rogelio Tucker MD  01/10/17  8:46 AM    Time: I spent over 30mins in the discharge planning of this patient.    EMR Dragon/Transcription disclaimer:   Much of this encounter note is an electronic transcription/translation of spoken language to printed text. The electronic translation of spoken language may permit erroneous, or at times, nonsensical words or phrases to be inadvertently transcribed; Although I have reviewed the note for such errors, some may still exist.

## 2017-01-08 NOTE — PROGRESS NOTES
[facility]  Phone: [phone]  Rogelio Tucker MD    Subjective:  LOS: 2    Better after lasix. Remains on O2.    Objective   Vital Signs past 24hrs  BP range: BP: (114-145)/(71-98) 114/71  Pulse range: Heart Rate:  [] 87  Resp rate range: Resp:  [18-22] 20  Temp range: Temp (24hrs), Av.7 °F (36.5 °C), Min:97.5 °F (36.4 °C), Max:98.1 °F (36.7 °C)    O2 Device: nasal cannulaFlow (L/min):  [2-3] 2  Oxygen range:SpO2:  [90 %-98 %] 94 %   122 lb (55.3 kg); Body mass index is 21.61 kg/(m^2).    Intake/Output Summary (Last 24 hours) at 17 1654  Last data filed at 17 0848   Gross per 24 hour   Intake    600 ml   Output      0 ml   Net    600 ml       Physical Exam   Constitutional: She is oriented to person, place, and time. She appears well-developed. Nasal cannula in place.   HENT:   Head: Normocephalic and atraumatic.   Eyes: Pupils are equal, round, and reactive to light.   Neck: Neck supple. No JVD present.   Cardiovascular: Normal rate and regular rhythm.    No murmur heard.  Pulmonary/Chest: Effort normal. No respiratory distress. She has decreased breath sounds. She has no wheezes. She has no rhonchi. She has no rales.   Abdominal: Soft. Bowel sounds are normal. She exhibits no mass. There is no tenderness.   Musculoskeletal: She exhibits edema (trace - ankles).   Neurological: She is alert and oriented to person, place, and time.   Skin: Skin is warm. No rash noted.   Psychiatric: She has a normal mood and affect.   Vitals reviewed.    Results Review:    I have reviewed the laboratory and imaging data since the last note by [facility] physician.  My annotations are noted in assessment and plan.    Medication Review:  I have reviewed the current MAR.  My annotations are noted in assessment and plan.       Plan   PCCM Problems  Acute hypoxic respiratory failure  Bibasilar infiltrates ? Atelectasis  Aspergillosis  +ve AFB on BAL  Recent +ve Influenza A  Diastolic dysfunction with  decompensation  Relevant Medical Diagnoses  Chronic Afib  Hyponatremia  Anemia    Plan of Treatment  Note O2 testing - arrange for home colette.    Some better with diuresis. Likely diastolic failure better with lasix. Repeat x 1.    Remains on Vfend. Pending AFB for decision re: KINA rx.    Eventual ablation by Dr. VANESSA Robbins. FU outpatient.    Home colette hopefully.    Rogelio Tucker MD  01/08/17  4:54 PM    EMR Dragon/Transcription disclaimer:   Some of this note may be an electronic transcription/translation of spoken language to printed text. The electronic translation of spoken language may permit erroneous, or at times, nonsensical words or phrases to be inadvertently transcribed; Although I have reviewed the note for such errors, some may still exist.

## 2017-01-08 NOTE — PROGRESS NOTES
Exercise Oximetry    Patient Name:Agnieszka Rizzo   MRN: [id number]   Date: 01/08/17             ROOM AIR BASELINE   SpO2% 93   Heart Rate 88   Blood Pressure      EXERCISE ON ROOM AIR SpO2% EXERCISE ON O2 @ 2LPM SpO2%   1 MINUTE 88 1 MINUTE 94   2 MINUTES  2 MINUTES 92   3 MINUTES  3 MINUTES 91   4 MINUTES  4 MINUTES 91   5 MINUTES  5 MINUTES 91   6 MINUTES  6 MINUTES 91              Distance Walked  160ft Distance Walked 400   Dyspnea (Dago Scale)  8 Dyspnea (Dago Scale) 8   Fatigue (Dago Scale)  5 Fatigue (Dago Scale) 5   SpO2% Post Exercise   SpO2% Post Exercise 94   HR Post Exercise   HR Post Exercise 109   Time to Recovery   Time to Recovery 2     Comments: Pt ambulated on room air without assist device. SPO2 88% on room air after 1min 30 sec  Pt placed on 2L and continue to ambulate x 6 min. SPO2 maintained > 90% during duration of walk.  She tolerated well & return to room on 2L O2.

## 2017-01-09 PROBLEM — R89.9 ACID-FAST BACTERIA PRESENT: Status: ACTIVE | Noted: 2017-01-09

## 2017-01-09 PROBLEM — J96.01 ACUTE RESPIRATORY FAILURE WITH HYPOXIA (HCC): Status: ACTIVE | Noted: 2017-01-09

## 2017-01-09 LAB
ANION GAP SERPL CALCULATED.3IONS-SCNC: 11.2 MMOL/L
BUN BLD-MCNC: 11 MG/DL (ref 8–23)
BUN/CREAT SERPL: 21.2 (ref 7–25)
CALCIUM SPEC-SCNC: 8.8 MG/DL (ref 8.6–10.5)
CHLORIDE SERPL-SCNC: 92 MMOL/L (ref 98–107)
CO2 SERPL-SCNC: 26.8 MMOL/L (ref 22–29)
CREAT BLD-MCNC: 0.52 MG/DL (ref 0.57–1)
GFR SERPL CREATININE-BSD FRML MDRD: 112 ML/MIN/1.73
GLUCOSE BLD-MCNC: 83 MG/DL (ref 65–99)
NT-PROBNP SERPL-MCNC: 1501 PG/ML (ref 0–1800)
POTASSIUM BLD-SCNC: 3.8 MMOL/L (ref 3.5–5.2)
SODIUM BLD-SCNC: 130 MMOL/L (ref 136–145)

## 2017-01-09 PROCEDURE — 83880 ASSAY OF NATRIURETIC PEPTIDE: CPT | Performed by: INTERNAL MEDICINE

## 2017-01-09 PROCEDURE — 94640 AIRWAY INHALATION TREATMENT: CPT

## 2017-01-09 PROCEDURE — 94799 UNLISTED PULMONARY SVC/PX: CPT

## 2017-01-09 PROCEDURE — 80048 BASIC METABOLIC PNL TOTAL CA: CPT | Performed by: INTERNAL MEDICINE

## 2017-01-09 PROCEDURE — 63710000001 PREDNISONE PER 5 MG: Performed by: INTERNAL MEDICINE

## 2017-01-09 PROCEDURE — 94760 N-INVAS EAR/PLS OXIMETRY 1: CPT

## 2017-01-09 RX ORDER — IPRATROPIUM BROMIDE AND ALBUTEROL SULFATE 2.5; .5 MG/3ML; MG/3ML
3 SOLUTION RESPIRATORY (INHALATION) EVERY 4 HOURS PRN
Qty: 360 ML | Refills: 5 | Status: ON HOLD | OUTPATIENT
Start: 2017-01-09 | End: 2017-02-11

## 2017-01-09 RX ORDER — IPRATROPIUM BROMIDE AND ALBUTEROL SULFATE 2.5; .5 MG/3ML; MG/3ML
3 SOLUTION RESPIRATORY (INHALATION)
Qty: 360 ML | Refills: 5 | Status: SHIPPED | OUTPATIENT
Start: 2017-01-09 | End: 2017-01-19 | Stop reason: SDUPTHER

## 2017-01-09 RX ORDER — POTASSIUM CHLORIDE 20 MEQ/1
20 TABLET, EXTENDED RELEASE ORAL DAILY
Qty: 30 TABLET | Refills: 1 | Status: SHIPPED | OUTPATIENT
Start: 2017-01-09 | End: 2017-01-10

## 2017-01-09 RX ORDER — PREDNISONE 10 MG/1
TABLET ORAL
Qty: 10 TABLET | Refills: 0 | Status: SHIPPED | OUTPATIENT
Start: 2017-01-09 | End: 2017-02-12 | Stop reason: HOSPADM

## 2017-01-09 RX ORDER — ACETYLCYSTEINE 200 MG/ML
4 SOLUTION ORAL; RESPIRATORY (INHALATION)
Qty: 500 ML | Refills: 1 | Status: SHIPPED | OUTPATIENT
Start: 2017-01-09 | End: 2018-10-19

## 2017-01-09 RX ORDER — PANTOPRAZOLE SODIUM 40 MG/1
40 TABLET, DELAYED RELEASE ORAL DAILY
Qty: 30 TABLET | Refills: 1 | Status: SHIPPED | OUTPATIENT
Start: 2017-01-09 | End: 2017-04-17

## 2017-01-09 RX ORDER — FUROSEMIDE 40 MG/1
40 TABLET ORAL DAILY
Qty: 30 TABLET | Refills: 1 | Status: SHIPPED | OUTPATIENT
Start: 2017-01-09 | End: 2017-01-10 | Stop reason: HOSPADM

## 2017-01-09 RX ADMIN — DABIGATRAN ETEXILATE MESYLATE 150 MG: 150 CAPSULE ORAL at 08:53

## 2017-01-09 RX ADMIN — LOSARTAN POTASSIUM 50 MG: 50 TABLET, FILM COATED ORAL at 18:11

## 2017-01-09 RX ADMIN — IPRATROPIUM BROMIDE AND ALBUTEROL SULFATE 3 ML: .5; 3 SOLUTION RESPIRATORY (INHALATION) at 19:18

## 2017-01-09 RX ADMIN — FUROSEMIDE 40 MG: 40 TABLET ORAL at 08:53

## 2017-01-09 RX ADMIN — POTASSIUM CHLORIDE 40 MEQ: 20 SOLUTION ORAL at 06:18

## 2017-01-09 RX ADMIN — LOSARTAN POTASSIUM 50 MG: 50 TABLET, FILM COATED ORAL at 08:54

## 2017-01-09 RX ADMIN — IPRATROPIUM BROMIDE AND ALBUTEROL SULFATE 3 ML: .5; 3 SOLUTION RESPIRATORY (INHALATION) at 23:31

## 2017-01-09 RX ADMIN — GUAIFENESIN 1200 MG: 600 TABLET, EXTENDED RELEASE ORAL at 18:11

## 2017-01-09 RX ADMIN — IPRATROPIUM BROMIDE AND ALBUTEROL SULFATE 3 ML: .5; 3 SOLUTION RESPIRATORY (INHALATION) at 10:30

## 2017-01-09 RX ADMIN — BUDESONIDE AND FORMOTEROL FUMARATE DIHYDRATE 2 PUFF: 160; 4.5 AEROSOL RESPIRATORY (INHALATION) at 19:27

## 2017-01-09 RX ADMIN — DABIGATRAN ETEXILATE MESYLATE 150 MG: 150 CAPSULE ORAL at 21:17

## 2017-01-09 RX ADMIN — FLUTICASONE PROPIONATE 2 SPRAY: 50 SPRAY, METERED NASAL at 08:51

## 2017-01-09 RX ADMIN — GUAIFENESIN 1200 MG: 600 TABLET, EXTENDED RELEASE ORAL at 08:54

## 2017-01-09 RX ADMIN — VORICONAZOLE 200 MG: 200 TABLET, FILM COATED ORAL at 08:53

## 2017-01-09 RX ADMIN — SODIUM CHLORIDE SOLN NEBU 7% 4 ML: 7 NEBU SOLN at 19:18

## 2017-01-09 RX ADMIN — VORICONAZOLE 200 MG: 200 TABLET, FILM COATED ORAL at 21:17

## 2017-01-09 RX ADMIN — IPRATROPIUM BROMIDE AND ALBUTEROL SULFATE 3 ML: .5; 3 SOLUTION RESPIRATORY (INHALATION) at 14:48

## 2017-01-09 RX ADMIN — IPRATROPIUM BROMIDE AND ALBUTEROL SULFATE 3 ML: .5; 3 SOLUTION RESPIRATORY (INHALATION) at 07:01

## 2017-01-09 RX ADMIN — PREDNISONE 20 MG: 20 TABLET ORAL at 08:54

## 2017-01-09 RX ADMIN — SODIUM CHLORIDE SOLN NEBU 7% 4 ML: 7 NEBU SOLN at 07:02

## 2017-01-09 RX ADMIN — PANTOPRAZOLE SODIUM 40 MG: 40 TABLET, DELAYED RELEASE ORAL at 06:18

## 2017-01-09 RX ADMIN — ATORVASTATIN CALCIUM 40 MG: 40 TABLET, FILM COATED ORAL at 21:16

## 2017-01-09 RX ADMIN — BUDESONIDE AND FORMOTEROL FUMARATE DIHYDRATE 2 PUFF: 160; 4.5 AEROSOL RESPIRATORY (INHALATION) at 07:02

## 2017-01-09 RX ADMIN — DILTIAZEM HYDROCHLORIDE 180 MG: 180 CAPSULE, COATED, EXTENDED RELEASE ORAL at 08:53

## 2017-01-09 NOTE — PROGRESS NOTES
Continued Stay Note  [facility]     Patient Name: Agnieszka Rizzo  MRN: [id number]  Today's Date: 1/9/2017    Admit Date: 1/6/2017          Discharge Plan       01/09/17 1314    Case Management/Social Work Plan    Plan Plan home and with [facility] if needed.  Shira RN              Discharge Codes     None            Agata Aleman, RN

## 2017-01-09 NOTE — PROGRESS NOTES
Continued Stay Note  [facility]     Patient Name: Agnieszka Rizzo  MRN: [id number]  Today's Date: 1/9/2017    Admit Date: 1/6/2017          Discharge Plan       01/09/17 1319    Case Management/Social Work Plan    Plan Plan home and with [facility] HH if needed.   DOLORES Silva    Additional Comments Wilmer ([facility]) called and will follow if HH needed.   DOLORES Silva      01/09/17 1314    Case Management/Social Work Plan    Plan Plan home and with [facility] if needed.  DOLORES Silva              Discharge Codes     None            Agata Aleman, RN

## 2017-01-09 NOTE — PROGRESS NOTES
Discharge Planning Assessment  [facility]     Patient Name: Agnieszka Rizzo  MRN: [id number]  Today's Date: 1/9/2017    Admit Date: 1/6/2017          Discharge Needs Assessment       01/09/17 1303    Living Environment    Lives With alone    Living Arrangements other (see comments)   [facility]    Provides Primary Care For no one    Quality Of Family Relationships supportive    Able to Return to Prior Living Arrangements yes    Discharge Needs Assessment    Anticipated Changes Related to Illness other (see comments)    Equipment Currently Used at Home bath bench;grab bar;respiratory supplies    Equipment Needed After Discharge bath bench;grab bar;respiratory supplies    Transportation Available car;family or friend will provide    Discharge Disposition home healthcare service    Discharge Planning Comments FACE SHEET VERIFIED/ IM LETTER SIGNED.  Spoke with pt at bedside.  Pt states she gets prescriptions at [facility]  [facility]  Pt is not current with HH.  Pt has not been to SNF.  Pt requesting new nebulizer- states the one she has is 20 years old.  Pt requesting [employer] for DME and O2 if needed.   Agnieszka  ([phone]) called to follow for O2 needs.  Pt provided  In Home Assistance List, HH list, and Road to Recovery provided to pt.  Pt requests [facility] HH follow for any HH issues.   Plan home and if HH needed [facility] HH.   DOLORES Sivla            Discharge Plan       01/09/17 1314    Case Management/Social Work Plan    Plan Plan home and with [facility] HH if needed.  DOLORES Silva        Discharge Placement     No information found                Demographic Summary       01/09/17 1302    Referral Information    Admission Type inpatient    Arrived From home or self-care    Primary Care Physician Information    Name Dr. Rogelio Tucker    Phone [phone]               Functional Status       01/09/17 1303    Functional Status Current    Ambulation 0-->independent    Transferring 0-->independent    Toileting 0-->independent     Bathing 0-->independent    Dressing 0-->independent    Eating 0-->independent    Communication 0-->understands/communicates without difficulty            Psychosocial     None            Abuse/Neglect     None            Legal     None            Substance Abuse     None            Patient Forms     None          Agata Aleman, RN

## 2017-01-09 NOTE — DISCHARGE INSTR - APPOINTMENTS
Follow up appointment with Dr. Tucker is scheduled for January 19 th 2017 at 10:00 a.m . If you cannot attend your appointment please call [phone] to reschedule.    Follow up appointment with Dr. Horne is scheduled for January 16 th 2017 at 11:00 a.m . If you cannot attend your appointment please call [phone] to reschedule.    Follow up appointment with  is scheduled for January 24 th 2017 at 12:00 p.m . If you cannot attend your appointment please call [phone] to reschedule.

## 2017-01-09 NOTE — PROGRESS NOTES
Continued Stay Note  [facility]     Patient Name: Agnieszka Rizzo  MRN: [id number]  Today's Date: 1/9/2017    Admit Date: 1/6/2017          Discharge Plan       01/09/17 1602    Case Management/Social Work Plan    Plan Plan home with [facility].   DOLORES Silva    Additional Comments Wilmer ([facility]) called and notified of pt's discharge.  Agnieszka ([facility]) informed on home O2 and nebulizer order.  Plan home with [facility].  DOLORES Silva      01/09/17 1319    Case Management/Social Work Plan    Plan Plan home and with [facility] if needed.   DOLORES Silva    Additional Comments Wilmer ([facility]) called and will follow if HH needed.   DOLORES Silva      01/09/17 1314    Case Management/Social Work Plan    Plan Plan home and with [facility] if needed.  DOLORES Silva              Discharge Codes     None        Expected Discharge Date and Time     Expected Discharge Date Expected Discharge Time    Jan 9, 2017             Agata Aleman RN

## 2017-01-10 ENCOUNTER — HOSPITAL ENCOUNTER (OUTPATIENT)
Dept: POSTOP/PACU | Facility: HOSPITAL | Age: 82
Discharge: HOME | End: 2017-01-10
Attending: INTERNAL MEDICINE

## 2017-01-10 ENCOUNTER — ANESTHESIA (OUTPATIENT)
Dept: POSTOP/PACU | Facility: HOSPITAL | Age: 82
End: 2017-01-10

## 2017-01-10 ENCOUNTER — ANESTHESIA EVENT (OUTPATIENT)
Dept: POSTOP/PACU | Facility: HOSPITAL | Age: 82
End: 2017-01-10

## 2017-01-10 VITALS
TEMPERATURE: 97.5 F | HEART RATE: 91 BPM | RESPIRATION RATE: 18 BRPM | SYSTOLIC BLOOD PRESSURE: 144 MMHG | DIASTOLIC BLOOD PRESSURE: 92 MMHG | BODY MASS INDEX: 22.47 KG/M2 | WEIGHT: 126.8 LBS | OXYGEN SATURATION: 94 % | HEIGHT: 63 IN

## 2017-01-10 VITALS
SYSTOLIC BLOOD PRESSURE: 123 MMHG | DIASTOLIC BLOOD PRESSURE: 79 MMHG | HEART RATE: 85 BPM | OXYGEN SATURATION: 98 % | RESPIRATION RATE: 16 BRPM | TEMPERATURE: 98.5 F

## 2017-01-10 VITALS — HEART RATE: 68 BPM | SYSTOLIC BLOOD PRESSURE: 123 MMHG | DIASTOLIC BLOOD PRESSURE: 79 MMHG | OXYGEN SATURATION: 100 %

## 2017-01-10 DIAGNOSIS — I48.19 PERSISTENT ATRIAL FIBRILLATION (HCC): ICD-10-CM

## 2017-01-10 LAB
ANION GAP SERPL CALCULATED.3IONS-SCNC: 11.1 MMOL/L
BUN BLD-MCNC: 11 MG/DL (ref 8–23)
BUN/CREAT SERPL: 22 (ref 7–25)
CALCIUM SPEC-SCNC: 8.8 MG/DL (ref 8.6–10.5)
CHLORIDE SERPL-SCNC: 93 MMOL/L (ref 98–107)
CO2 SERPL-SCNC: 27.9 MMOL/L (ref 22–29)
CREAT BLD-MCNC: 0.5 MG/DL (ref 0.57–1)
GFR SERPL CREATININE-BSD FRML MDRD: 117 ML/MIN/1.73
GLUCOSE BLD-MCNC: 83 MG/DL (ref 65–99)
POTASSIUM BLD-SCNC: 4.1 MMOL/L (ref 3.5–5.2)
SODIUM BLD-SCNC: 132 MMOL/L (ref 136–145)

## 2017-01-10 PROCEDURE — 92960 CARDIOVERSION ELECTRIC EXT: CPT | Performed by: INTERNAL MEDICINE

## 2017-01-10 PROCEDURE — 25010000002 PROPOFOL 10 MG/ML EMULSION: Performed by: ANESTHESIOLOGY

## 2017-01-10 PROCEDURE — 80048 BASIC METABOLIC PNL TOTAL CA: CPT | Performed by: INTERNAL MEDICINE

## 2017-01-10 PROCEDURE — 92960 CARDIOVERSION ELECTRIC EXT: CPT

## 2017-01-10 PROCEDURE — 93005 ELECTROCARDIOGRAM TRACING: CPT | Performed by: INTERNAL MEDICINE

## 2017-01-10 PROCEDURE — 93010 ELECTROCARDIOGRAM REPORT: CPT | Performed by: INTERNAL MEDICINE

## 2017-01-10 PROCEDURE — 5A2204Z RESTORATION OF CARDIAC RHYTHM, SINGLE: ICD-10-PCS | Performed by: INTERNAL MEDICINE

## 2017-01-10 PROCEDURE — 99024 POSTOP FOLLOW-UP VISIT: CPT | Performed by: INTERNAL MEDICINE

## 2017-01-10 PROCEDURE — 63710000001 PREDNISONE PER 5 MG: Performed by: INTERNAL MEDICINE

## 2017-01-10 RX ORDER — SODIUM CHLORIDE 9 MG/ML
INJECTION, SOLUTION INTRAVENOUS CONTINUOUS PRN
Status: DISCONTINUED | OUTPATIENT
Start: 2017-01-10 | End: 2017-01-10 | Stop reason: SURG

## 2017-01-10 RX ORDER — FUROSEMIDE 20 MG/1
20 TABLET ORAL 2 TIMES DAILY
Qty: 30 TABLET | Refills: 1 | Status: SHIPPED | OUTPATIENT
Start: 2017-01-10 | End: 2017-01-19 | Stop reason: SDUPTHER

## 2017-01-10 RX ORDER — LIDOCAINE HYDROCHLORIDE 10 MG/ML
INJECTION, SOLUTION INFILTRATION; PERINEURAL AS NEEDED
Status: DISCONTINUED | OUTPATIENT
Start: 2017-01-10 | End: 2017-01-10 | Stop reason: SURG

## 2017-01-10 RX ORDER — PROPOFOL 10 MG/ML
VIAL (ML) INTRAVENOUS AS NEEDED
Status: DISCONTINUED | OUTPATIENT
Start: 2017-01-10 | End: 2017-01-10 | Stop reason: SURG

## 2017-01-10 RX ORDER — POTASSIUM CHLORIDE 750 MG/1
10 TABLET, EXTENDED RELEASE ORAL DAILY
Qty: 30 TABLET | Refills: 2 | Status: SHIPPED | OUTPATIENT
Start: 2017-01-10 | End: 2017-01-19 | Stop reason: SDUPTHER

## 2017-01-10 RX ADMIN — DILTIAZEM HYDROCHLORIDE 180 MG: 180 CAPSULE, COATED, EXTENDED RELEASE ORAL at 09:02

## 2017-01-10 RX ADMIN — LOSARTAN POTASSIUM 50 MG: 50 TABLET, FILM COATED ORAL at 09:02

## 2017-01-10 RX ADMIN — FUROSEMIDE 40 MG: 40 TABLET ORAL at 09:02

## 2017-01-10 RX ADMIN — GUAIFENESIN 1200 MG: 600 TABLET, EXTENDED RELEASE ORAL at 09:02

## 2017-01-10 RX ADMIN — SODIUM CHLORIDE: 9 INJECTION, SOLUTION INTRAVENOUS at 07:30

## 2017-01-10 RX ADMIN — DABIGATRAN ETEXILATE MESYLATE 150 MG: 150 CAPSULE ORAL at 09:02

## 2017-01-10 RX ADMIN — LIDOCAINE HYDROCHLORIDE 10 ML: 10 INJECTION, SOLUTION INFILTRATION; PERINEURAL at 07:44

## 2017-01-10 RX ADMIN — VORICONAZOLE 200 MG: 200 TABLET, FILM COATED ORAL at 09:02

## 2017-01-10 RX ADMIN — PANTOPRAZOLE SODIUM 40 MG: 40 TABLET, DELAYED RELEASE ORAL at 09:36

## 2017-01-10 RX ADMIN — PREDNISONE 20 MG: 20 TABLET ORAL at 09:02

## 2017-01-10 RX ADMIN — PROPOFOL 40 MG: 10 INJECTION, EMULSION INTRAVENOUS at 07:51

## 2017-01-10 RX ADMIN — PROPOFOL 50 MG: 10 INJECTION, EMULSION INTRAVENOUS at 07:44

## 2017-01-10 NOTE — ANESTHESIA POSTPROCEDURE EVALUATION
Patient: Agnieszka Rizzo    Procedure Summary     Date Anesthesia Start Anesthesia Stop Room / Location    01/10/17 0743 [id number] [facility] PACU       Procedure Diagnosis Scheduled Providers Provider    CARDIOVERSION EXTERNAL Persistent atrial fibrillation  (a fib)  Rafael Goodrich MD          Anesthesia Type: MAC  Last vitals  BP      Temp      Pulse     Resp      SpO2        Post Anesthesia Care and Evaluation      Comments: Patient discharged before being evaluated by an Anesthesiologist. No apparent complications per the record.  This case was not medically directed.

## 2017-01-10 NOTE — ANESTHESIA PREPROCEDURE EVALUATION
Anesthesia Evaluation     Patient summary reviewed and Nursing notes reviewed    History of anesthetic complications (child  of MH)   Airway   Mallampati: III  TM distance: <3 FB  Neck ROM: full  possible difficult intubation  Dental - normal exam     Pulmonary     breath sounds clear to auscultation  (+) pneumonia , asthma, shortness of breath,   Cardiovascular   (+) hypertension poorly controlled, valvular problems/murmurs, CAD, dysrhythmias Atrial Fib,     Rhythm: irregular  Rate: abnormal    Neuro/Psych  GI/Hepatic/Renal/Endo    (+)  hepatitis, liver disease,     Musculoskeletal     Abdominal    Substance History      OB/GYN          Other                             Anesthesia Plan    ASA 3     MAC     intravenous induction   Anesthetic plan and risks discussed with patient.

## 2017-01-10 NOTE — PROCEDURES
Anesthesia was present and sedated the patient.    One shock of 120 J sunk to the R-wave was delivered.  She converted to sinus rhythm with frequent premature atrial contractions and then to normal sinus rhythm.    A few minutes later she returned to atrial fibrillation. I shocked her again with 120J sunk to the R-wave.  She continued in atrial fibrillation.  I shocked her a third time at 150 J sunc to the R-wave.  She returned to normal sinus rhythm.  However, she quickly converted back to atrial fibrillation.    Conclusions:  Unsuccessful cardioversion to normal sinus rhythm.

## 2017-01-10 NOTE — PROGRESS NOTES
Patient Name: Agnieszka Rizzo  :1930  86 y.o.      Patient Care Team:  Rogelio Tucker MD as PCP - General (Pulmonary Disease)  Gabe Horne MD as PCP - Family Medicine    Interval History:     This is a lady I met while she was hospitalized in 2016. She has a significant pulmonary history and was having a bronchoscopy and unfortunately developed a pneumothorax. She was found to be in rate -controlled atrial fibrillation. She had previously been seen by Dr. Ana Goodrich for history of hypertension, hyperlipidemia, mild mitral valve prolapse and nonobstructive coronary artery disease diagnosed by heart catheterization in .     Echocardiogram November 15, 2016:  · All left ventricular wall segments contract normally.  · Left ventricular function is normal. Estimated EF = 58%.  · Left atrial cavity size is moderately dilated.  · Mild tricuspid valve regurgitation is present.  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 They felt she would do well on oral anticoagulation and I started her on Pradaxa. I did not do a stress test while she was hospitalized because she was wheezing and I did not fill comfortable giving her Lexiscan at that time and I did not when he is dobutamine because of her atrial fibrillation.      She was able to have a stress as an outpt on 16 and this was normal.  She continued to complain of dyspnea on exertion and so I had her set up for a cardioversion.  She has had a couple of hospitalizations in the meantime for respiratory issues.  She was actually ready for discharge yesterday but we kept her overnight since she artery had a cardioversion scheduled for this morning.    Subjective:  She is feeling better.  She continues to have some dyspnea.  She denies chest pain.  She reports compliance with Pradaxa and states she has not missed a  "dose.     Objective   Vital Signs  Temp:  [97.5 °F (36.4 °C)] 97.5 °F (36.4 °C)  Heart Rate:  [79-89] 87  Resp:  [16] 16  BP: (122-132)/(73-86) 132/73    Intake/Output Summary (Last 24 hours) at 01/10/17 0734  Last data filed at 01/09/17 1422   Gross per 24 hour   Intake    480 ml   Output      0 ml   Net    480 ml     Flowsheet Rows         First Filed Value    Admission Height  63\" (160 cm) Documented at 01/06/2017 1828    Admission Weight  130 lb (59 kg) Documented at 01/06/2017 1828          Physical Exam:   General Appearance:    Alert, cooperative, in no acute distress   Lungs:     Clear to auscultation.  Normal respiratory effort and rate.      Heart:    Irreg irreg, normal S1 and S2, no murmurs, gallops or rubs.     Chest Wall:    No abnormalities observed   Abdomen:     Soft, nontender, positive bowel sounds.     Extremities:   no cyanosis, clubbing or edema.  No marked joint deformities.  Adequate musculoskeletal strength.       Results Review:      Results from last 7 days  Lab Units 01/10/17  0555   SODIUM mmol/L 132*   POTASSIUM mmol/L 4.1   CHLORIDE mmol/L 93*   TOTAL CO2 mmol/L 27.9   BUN mg/dL 11   CREATININE mg/dL 0.50*   GLUCOSE mg/dL 83   CALCIUM mg/dL 8.8       Results from last 7 days  Lab Units 01/06/17  1857   TROPONIN T ng/mL <0.010       Results from last 7 days  Lab Units 01/08/17  0436   WBC 10*3/mm3 8.13   HEMOGLOBIN g/dL 10.7*   HEMATOCRIT % 32.3*   PLATELETS 10*3/mm3 332               Results from last 7 days  Lab Units 01/08/17  0436   MAGNESIUM mg/dL 2.3             Medication Review:     atorvastatin 40 mg Oral Daily   budesonide-formoterol 2 puff Inhalation BID - RT   dabigatran etexilate 150 mg Oral Q12H   diltiaZEM  mg Oral Daily   fluticasone 2 spray Each Nare Daily   furosemide 40 mg Oral Daily   guaiFENesin 1,200 mg Oral BID   ipratropium-albuterol 3 mL Nebulization 4x Daily - RT   losartan 50 mg Oral BID   pantoprazole 40 mg Oral Q AM   predniSONE 20 mg Oral Daily With " Breakfast   sodium chloride 4 mL Nebulization BID - RT   voriconazole 200 mg Oral Q12H             Assessment/Plan     1.  Atrial fibrillation.  She has a CHADS2-Vasc score of 5.  She has been compliant with Pradaxa and not missed any doses.  She had a cardioversion scheduled for this morning as an outpatient.  The hope was that she will feel better in sinus rhythm.  She has also had a little bit of diastolic heart failure recently which would hopefully be better in sinus.  She briefly converted to sinus rhythm but quickly returned to atrial fibrillation with controlled ventricular response.  At this time, I recommend that we continue with rate control and anticoagulation.  With her degree of lung disease and how quickly she went right back into atrial fibrillation, I think it is unlikely she will maintain sinus rhythm.  I recommend we continue with medical management.  I would continue with diltiazem, Pradaxa and use diuretics as needed to maintain her volume status.  2.  Diastolic heart failure.  She's been having some problems with volume overload.  3. Dyspnea on exertion. Multifactorial  4. Mitral valve prolapse with very mild mitral regurgitation.  5. Mild tricuspid regurgitation without pulmonary hypertension.  6. Nonobstructive coronary artery disease diagnosed by catheter in 2007. She has noted coronary artery calcification on CT scans. Nuclear stress 12/16 was normal.  7. Hypertension. Her blood pressure is controlled.  8. Hyperlipidemia.  9. Hyponatremia. Stable.     I am ok with her going home today. She should follow up in my office next week with myself or Gemma Bravo to reassess her volume status. She will be DC'ed on lasix 20mg QD.     Marcie Robbins MD, [facility]  01/10/17  7:34 AM

## 2017-01-10 NOTE — PROGRESS NOTES
Continued Stay Note  [facility]     Patient Name: Agnieszka Rizzo  MRN: [id number]  Today's Date: 1/10/2017    Admit Date: 1/6/2017          Discharge Plan       01/10/17 0939    Case Management/Social Work Plan    Plan Plan home with [facility] and nebulizer and portable O2 supplied by Sudhir Silva RN    Additional Comments Spoke with pt and daughter (Lyssa Zaldivar [phone]) at bedside.  Pt was not discharged 1/9 but does have d/c order for 1/10.  Pt has nebulizer and O2 which was provided by Judi Guillen ([facility]) called and informed of pt's discharge.  Plan home.   DOLORES Silva              Discharge Codes     None        Expected Discharge Date and Time     Expected Discharge Date Expected Discharge Time    Jan 9, 2017             Agata Aleman RN

## 2017-01-11 ENCOUNTER — TELEPHONE (OUTPATIENT)
Dept: CARDIOLOGY | Facility: CLINIC | Age: 82
End: 2017-01-11

## 2017-01-11 LAB — BACTERIA SPEC AEROBE CULT: NORMAL

## 2017-01-11 NOTE — TELEPHONE ENCOUNTER
I have moved Pt appointment as she has asked. Can you mail an appointment reminder and make sure it has listed the address for the [place] address? Thank you

## 2017-01-12 LAB — BACTERIA SPEC AEROBE CULT: NORMAL

## 2017-01-16 ENCOUNTER — TELEPHONE (OUTPATIENT)
Dept: CARDIOLOGY | Facility: CLINIC | Age: 82
End: 2017-01-16

## 2017-01-16 DIAGNOSIS — I25.10 CORONARY ARTERY DISEASE INVOLVING NATIVE CORONARY ARTERY OF NATIVE HEART WITHOUT ANGINA PECTORIS: Primary | ICD-10-CM

## 2017-01-16 NOTE — TELEPHONE ENCOUNTER
Pt reports that she is due to have labs drawn tomorrow morning by visiting nurse and would like to have additional labs drawn for cholesterol medicine, she states that she is about to run out of this medicine. Please advise    Visiting DOLORES Guzman- [phone]

## 2017-01-17 ENCOUNTER — LAB REQUISITION (OUTPATIENT)
Dept: LAB | Facility: HOSPITAL | Age: 82
End: 2017-01-17

## 2017-01-17 DIAGNOSIS — Z00.00 ENCOUNTER FOR GENERAL ADULT MEDICAL EXAMINATION WITHOUT ABNORMAL FINDINGS: ICD-10-CM

## 2017-01-17 LAB
ANION GAP SERPL CALCULATED.3IONS-SCNC: 15.2 MMOL/L
BUN BLD-MCNC: 15 MG/DL (ref 8–23)
BUN/CREAT SERPL: 22.1 (ref 7–25)
CALCIUM SPEC-SCNC: 9 MG/DL (ref 8.6–10.5)
CHLORIDE SERPL-SCNC: 94 MMOL/L (ref 98–107)
CHOLEST SERPL-MCNC: 212 MG/DL (ref 0–200)
CO2 SERPL-SCNC: 25.8 MMOL/L (ref 22–29)
CREAT BLD-MCNC: 0.68 MG/DL (ref 0.57–1)
GFR SERPL CREATININE-BSD FRML MDRD: 82 ML/MIN/1.73
GLUCOSE BLD-MCNC: 102 MG/DL (ref 65–99)
HDLC SERPL-MCNC: 65 MG/DL (ref 40–60)
LDLC SERPL CALC-MCNC: 120 MG/DL (ref 0–100)
LDLC/HDLC SERPL: 1.84 {RATIO}
POTASSIUM BLD-SCNC: 4.4 MMOL/L (ref 3.5–5.2)
SODIUM BLD-SCNC: 135 MMOL/L (ref 136–145)
TRIGL SERPL-MCNC: 136 MG/DL (ref 0–150)
VLDLC SERPL-MCNC: 27.2 MG/DL (ref 5–40)

## 2017-01-17 PROCEDURE — 80061 LIPID PANEL: CPT | Performed by: INTERNAL MEDICINE

## 2017-01-17 PROCEDURE — 80048 BASIC METABOLIC PNL TOTAL CA: CPT | Performed by: INTERNAL MEDICINE

## 2017-01-19 ENCOUNTER — OFFICE VISIT (OUTPATIENT)
Dept: CARDIOLOGY | Facility: CLINIC | Age: 82
End: 2017-01-19

## 2017-01-19 VITALS
HEART RATE: 86 BPM | WEIGHT: 129 LBS | SYSTOLIC BLOOD PRESSURE: 132 MMHG | DIASTOLIC BLOOD PRESSURE: 80 MMHG | RESPIRATION RATE: 16 BRPM | HEIGHT: 63 IN | BODY MASS INDEX: 22.86 KG/M2

## 2017-01-19 DIAGNOSIS — E78.2 MIXED HYPERLIPIDEMIA: ICD-10-CM

## 2017-01-19 DIAGNOSIS — I48.21 PERMANENT ATRIAL FIBRILLATION (HCC): Primary | ICD-10-CM

## 2017-01-19 DIAGNOSIS — I10 BENIGN ESSENTIAL HYPERTENSION: ICD-10-CM

## 2017-01-19 DIAGNOSIS — I25.10 CHRONIC CORONARY ARTERY DISEASE: ICD-10-CM

## 2017-01-19 PROCEDURE — 99214 OFFICE O/P EST MOD 30 MIN: CPT | Performed by: INTERNAL MEDICINE

## 2017-01-19 PROCEDURE — 93000 ELECTROCARDIOGRAM COMPLETE: CPT | Performed by: INTERNAL MEDICINE

## 2017-01-19 RX ORDER — EZETIMIBE 10 MG/1
10 TABLET ORAL DAILY
Qty: 90 TABLET | Refills: 3 | Status: SHIPPED | OUTPATIENT
Start: 2017-01-19 | End: 2018-04-03 | Stop reason: SDUPTHER

## 2017-01-19 RX ORDER — ATORVASTATIN CALCIUM 40 MG/1
40 TABLET, FILM COATED ORAL DAILY
Qty: 90 TABLET | Refills: 3 | Status: SHIPPED | OUTPATIENT
Start: 2017-01-19 | End: 2018-04-03 | Stop reason: SDUPTHER

## 2017-01-19 RX ORDER — FUROSEMIDE 20 MG/1
20 TABLET ORAL 2 TIMES DAILY
Qty: 180 TABLET | Refills: 3 | Status: SHIPPED | OUTPATIENT
Start: 2017-01-19 | End: 2017-02-12 | Stop reason: HOSPADM

## 2017-01-19 RX ORDER — POTASSIUM CHLORIDE 750 MG/1
10 TABLET, EXTENDED RELEASE ORAL DAILY
Qty: 90 TABLET | Refills: 3 | Status: SHIPPED | OUTPATIENT
Start: 2017-01-19 | End: 2017-02-12 | Stop reason: HOSPADM

## 2017-01-19 NOTE — PROGRESS NOTES
PATIENTINFORMATION    Date of Office Visit: 2017  Encounter Provider: Marcie Robbins MD  Place of Service: [facility]  Patient Name: Agnieszka Rizoz  : 1930    Subjective:     Encounter Date:2017      Patient ID: Agnieszka Rizzo is a 86 y.o. female.      History of Present Illness       This is a lady I met while she was hospitalized in 2016. She has a significant pulmonary history and was having a bronchoscopy and unfortunately developed a pneumothorax. She was found to be in rate -controlled atrial fibrillation. She had previously been seen by Dr. Ana Goodrich for history of hypertension, hyperlipidemia, mild mitral valve prolapse and nonobstructive coronary artery disease diagnosed by heart catheterization in .      Echocardiogram November 15, 2016:  · All left ventricular wall segments contract normally.  · Left ventricular function is normal. Estimated EF = 58%.  · Left atrial cavity size is moderately dilated.  · Mild tricuspid valve regurgitation is present.  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 They felt she would do well on oral anticoagulation and I started her on Pradaxa. I did not do a stress test while she was hospitalized because she was wheezing and I did not fill comfortable giving her Lexiscan at that time and I did not when he is dobutamine because of her atrial fibrillation.       She was able to have a stress as an outpt on 16 and this was normal.  She continued to complain of dyspnea on exertion and so I had her set up for a cardioversion. She had a couple of hospitalizations in the meantime for respiratory issues.  While she was in the hospital in 2017, I attempted to cardiovert her.  I shocked her 3 times that she did not remain in sinus rhythm.  I spoke with her family  "and we decided to continue with rate control and anticoagulation.    She comes in today and is generally feeling well.  She is doing physical therapy at home.  Her oxygen saturations have been good.  Her heart rate will go up into the 120s with exercise but she does not have any symptoms with that.  Her blood pressure has been well-controlled.  Her breathing has improved.  She has noticed some mild lower extremity edema.  She is tolerating Lasix 20 mg twice a day.    Review of Systems   Constitution: Positive for malaise/fatigue. Negative for fever, weight gain and weight loss.   HENT: Negative for ear pain, hearing loss, nosebleeds and sore throat.    Eyes: Negative for double vision, pain, vision loss in left eye and vision loss in right eye.   Cardiovascular: Positive for leg swelling.        See history of present illness.   Respiratory: Positive for cough, shortness of breath and wheezing. Negative for sleep disturbances due to breathing and snoring.    Endocrine: Negative for cold intolerance, heat intolerance and polyuria.   Skin: Negative for itching, poor wound healing and rash.   Musculoskeletal: Negative for joint pain, joint swelling and myalgias.   Gastrointestinal: Negative for abdominal pain, diarrhea, hematochezia, nausea and vomiting.   Genitourinary: Negative for hematuria and hesitancy.   Neurological: Negative for numbness, paresthesias and seizures.   Psychiatric/Behavioral: Negative for depression. The patient is not nervous/anxious.            ECG 12 Lead  Date/Time: 1/19/2017 3:05 PM  Performed by: ANGELI STODDARD  Authorized by: ANGELI STODDARD   Comparison: compared with previous ECG from 1/10/2017  Similar to previous ECG  Rhythm: atrial fibrillation  BPM: 86  Other findings: PRWP  Clinical impression: abnormal ECG               Objective:       Visit Vitals   • /80 (BP Location: Left arm, Patient Position: Sitting, Cuff Size: Adult)   • Pulse 86   • Resp 16   • Ht 63\" (160 cm)   • " Wt 129 lb (58.5 kg)   • BMI 22.85 kg/m2    Body mass index is 22.85 kg/(m^2).     Physical Exam   Constitutional: She appears well-developed.   HENT:   Head: Normocephalic and atraumatic.   Eyes: Conjunctivae and lids are normal. Pupils are equal, round, and reactive to light. Lids are everted and swept, no foreign bodies found.   Neck: Normal range of motion. No JVD present. Carotid bruit is not present. No tracheal deviation present. No thyroid mass present.   Cardiovascular: Normal rate and normal heart sounds.  An irregularly irregular rhythm present.   Pulses:       Dorsalis pedis pulses are 2+ on the right side, and 2+ on the left side.   Pulmonary/Chest: Effort normal and breath sounds normal.   Abdominal: Normal appearance and bowel sounds are normal.   Musculoskeletal: Normal range of motion.   Neurological: She is alert. She has normal strength.   Skin: Skin is warm, dry and intact.   Psychiatric: She has a normal mood and affect. Her behavior is normal.   Vitals reviewed.      Lab Review: I reviewed her recent lipid panel and basic metabolic panel.  LDL is 120.  HDL is 65.      Assessment/Plan:       1. Atrial fibrillation.  She failed cardioversion in January 2017.  She has a CHADS2-Vasc score of 5.  I am going to continue with rate control and anticoagulation.  She is tolerating Pradaxa well.  She is on diltiazem.  I am avoiding a beta blocker because of her significant lung disease.  2. Diastolic heart failure.  She is doing well on 20 mg twice a day of Lasix.  I have refilled that to [employer] as she requested.  She is also on some potassium replacement.  She has some mild lower extremity edema.  I recommended support socks.  3. Dyspnea on exertion. Multifactorial  4. Mitral valve prolapse with very mild mitral regurgitation.  5. Mild tricuspid regurgitation without pulmonary hypertension.  6. Nonobstructive coronary artery disease diagnosed by catheter in 2007. She has noted coronary artery  calcification on CT scans. Nuclear stress 12/16 was normal.  I would like to see her cholesterol a little better controlled.  She used to be on Zetia and tolerated it.  I'm going to restart it.  She is not comfortable with going to a higher dose of atorvastatin.  7. Hypertension. Her blood pressure is controlled.  8. Hyperlipidemia.  As above.  9. Hyponatremia. Stable.      She is doing well.  I will repeat blood tests in about 8 weeks.  I will see her back in 3 months.  As she gets closer to needing refills on the losartan, Pradaxa and diltiazem, she will call so we can send that to the [facility] pharmacy.  I sent the Lasix, potassium, atorvastatin and Zetia to [facility] today.     Orders Placed This Encounter   Procedures   • Lipid Panel     Standing Status:   Future     Standing Expiration Date:   1/19/2018   • Comprehensive Metabolic Panel     Standing Status:   Future     Standing Expiration Date:   1/19/2018   • ECG 12 Lead     This order was created via procedure documentation      Agnieszka Rizzo   [facility] Medication Instructions [facility]:    Printed on:01/19/17 8479   Medication Information                      acetylcysteine (MUCOMYST) 20 % nebulizer solution  Take 4 mL by nebulization 4 (Four) Times a Day.             ADVAIR -21 MCG/ACT inhaler  Inhale 2 puffs 2 (Two) Times a Day.             atorvastatin (LIPITOR) 40 MG tablet  Take 1 tablet by mouth Daily.             B Complex-C (SUPER B COMPLEX PO)  Take  by mouth.             cholecalciferol (VITAMIN D3) 1000 UNITS tablet  Take 1,000 Units by mouth Daily.             dabigatran etexilate (PRADAXA) 150 MG capsu  Take 1 capsule by mouth Every 12 (Twelve) Hours.             diltiaZEM CD (CARTIA XT) 180 MG 24 hr capsule  Take 1 capsule by mouth Daily.             ezetimibe (ZETIA) 10 MG tablet  Take 1 tablet by mouth Daily.             furosemide (LASIX) 20 MG tablet  Take 1 tablet by mouth 2 (Two) Times a Day.             Glucosamine-Chondroit-Vit C-Mn  (GLUCOSAMINE-CHONDROITIN) tablet  Take 1 tablet by mouth Daily.             ipratropium-albuterol (DUO-NEB) 0.5-2.5 mg/mL nebulizer  Take 3 mL by nebulization Every 4 (Four) Hours As Needed for wheezing or shortness of air for up to 30 days.             loratadine (ALLERGY RELIEF) 10 MG tablet  Take 10 mg by mouth Daily.             losartan (COZAAR) 50 MG tablet  50 mg 2 (Two) Times a Day.             Multiple Vitamin (MULTIVITAMIN) tablet  Take 1 tablet by mouth Daily.             pantoprazole (PROTONIX) 40 MG EC tablet  Take 1 tablet by mouth Daily.             potassium chloride (K-DUR,KLOR-CON) 10 MEQ CR tablet  Take 1 tablet by mouth Daily.             predniSONE (DELTASONE) 10 MG tablet  2 tabs daily x 3 days then 1 tab daily x 3 days then stop             VERAMYST 27.5 MCG/SPRAY nasal spray  1 spray into each nostril Daily.             voriconazole (VFEND) 200 MG tablet  Take 200 mg by mouth 2 (Two) Times a Day.               Atrial Fibrillation and Atrial Flutter  Assessment  • The patient has permanent atrial fibrillation  • This is non-valvular in etiology  • The patient's CHADS2-VASc score is 5  • A [id number] score of 2 or more is considered a high risk for a thromboembolic event  • Dabigatran prescribed    Plan  • Continue in atrial fibrillation with rate control  • Continue dabigatran for antithrombotic therapy, bleeding issues discussed  • Add diltiazem for rate control    Subjective - Objective  • The patient underwent cardioversion on 1/10/2017       Coronary Artery Disease  Assessment  • The patient has no angina    Plan  • Lifestyle modifications discussed include adhering to a heart healthy diet and regular exercise               Marcie Robbins MD  01/19/17  3:12 PM

## 2017-01-19 NOTE — MR AVS SNAPSHOT
Agnieszka Rizzo   1/19/2017 2:20 PM   Office Visit    Dept Phone:  [phone]   Encounter #:  [id number]    Provider:  Marcie Robbins MD   Department:  [facility] CARDIOLOGY                Your Full Care Plan              Today's Medication Changes          These changes are accurate as of: 1/19/17  2:54 PM.  If you have any questions, ask your nurse or doctor.               New Medication(s)Ordered:     ezetimibe 10 MG tablet   Commonly known as:  ZETIA   Take 1 tablet by mouth Daily.   Started by:  Marcie Robbins MD         Medication(s)that have changed:     ipratropium-albuterol 0.5-2.5 mg/mL nebulizer   Commonly known as:  DUO-NEB   Take 3 mL by nebulization Every 4 (Four) Hours As Needed for wheezing or shortness of air for up to 30 days.   What changed:  Another medication with the same name was removed. Continue taking this medication, and follow the directions you see here.            Where to Get Your Medications      These medications were sent to [facility] - [place], [place] - [address] - [phone] [facility] [phone] FX  [address] [facility] [address]     Phone:  [phone]     atorvastatin 40 MG tablet    ezetimibe 10 MG tablet    furosemide 20 MG tablet    potassium chloride 10 MEQ CR tablet                  Your Updated Medication List          This list is accurate as of: 1/19/17  2:54 PM.  Always use your most recent med list.                acetylcysteine 20 % nebulizer solution   Commonly known as:  MUCOMYST   Take 4 mL by nebulization 4 (Four) Times a Day.       ADVAIR -21 MCG/ACT inhaler   Generic drug:  fluticasone-salmeterol   Inhale 2 puffs 2 (Two) Times a Day.       ALLERGY RELIEF 10 MG tablet   Generic drug:  loratadine       atorvastatin 40 MG tablet   Commonly known as:  LIPITOR   Take 1 tablet by mouth Daily.       cholecalciferol 1000 UNITS tablet   Commonly known as:  VITAMIN D3       dabigatran etexilate 150 MG capsu   Commonly known as:  PRADAXA   Take 1 capsule by mouth Every 12 (Twelve) Hours.       diltiaZEM  MG 24 hr capsule   Commonly known as:  CARTIA XT   Take 1 capsule by mouth Daily.       ezetimibe 10 MG tablet   Commonly known as:  ZETIA   Take 1 tablet by mouth Daily.       furosemide 20 MG tablet   Commonly known as:  LASIX   Take 1 tablet by mouth 2 (Two) Times a Day.       Glucosamine-Chondroitin tablet       ipratropium-albuterol 0.5-2.5 mg/mL nebulizer   Commonly known as:  DUO-NEB   Take 3 mL by nebulization Every 4 (Four) Hours As Needed for wheezing or shortness of air for up to 30 days.       losartan 50 MG tablet   Commonly known as:  COZAAR       multivitamin tablet       pantoprazole 40 MG EC tablet   Commonly known as:  PROTONIX   Take 1 tablet by mouth Daily.       potassium chloride 10 MEQ CR tablet   Commonly known as:  K-DUR,KLOR-CON   Take 1 tablet by mouth Daily.       predniSONE 10 MG tablet   Commonly known as:  DELTASONE   2 tabs daily x 3 days then 1 tab daily x 3 days then stop       SUPER B COMPLEX PO       VERAMYST 27.5 MCG/SPRAY nasal spray   Generic drug:  fluticasone       voriconazole 200 MG tablet   Commonly known as:  VFEND               You Were Diagnosed With        Codes Comments    Benign essential hypertension    -  Primary ICD-10-CM: I10  ICD-9-CM: 401.1     Chronic coronary artery disease     ICD-10-CM: I25.10  ICD-9-CM: 414.00     Mixed hyperlipidemia     ICD-10-CM: E78.2  ICD-9-CM: 272.2       Instructions     None    Patient Instructions History      Upcoming Appointments     Visit Type Date Time Department    HOSPITAL FOLLOW UP 1/19/2017  2:20 PM [employer]    FOLLOW UP 4/13/2017 10:20 AM [employer] Signup     [employer] allows you to send messages to your doctor, view your test results, renew your prescriptions, schedule appointments, and more. To sign up, go to [employer] and  "click on the Sign Up Now link in the New User? box. Enter your [employer] Activation Code exactly as it appears below along with the last four digits of your Social Security Number and your Date of Birth () to complete the sign-up process. If you do not sign up before the expiration date, you must request a new code.    [employer] Activation Code: [email or web address]  Expires: 2017  2:54 PM    If you have questions, you can email [email or web address] or call [phone] to talk to our [employer] staff. Remember, [employer] is NOT to be used for urgent needs. For medical emergencies, dial 911.               Other Info from Your Visit           Your Appointments     2017 10:20 AM EDT   Follow Up with Marcie Robbins MD   [facility] CARDIOLOGY (--)    [address], [address] [address] [phone]           Arrive 15 minutes prior to appointment.              Allergies     Erythromycin      Levaquin [Levofloxacin]      Macrobid [Nitrofurantoin]        Vital Signs     Blood Pressure Pulse Respirations Height Weight Body Mass Index    132/80 (BP Location: Left arm, Patient Position: Sitting, Cuff Size: Adult) 86 16 63\" (160 cm) 129 lb (58.5 kg) 22.85 kg/m2    Smoking Status                   Never Smoker           Problems and Diagnoses Noted     Benign essential hypertension    Heart disease due to blocked artery    High cholesterol or triglycerides        "

## 2017-02-06 ENCOUNTER — LAB REQUISITION (OUTPATIENT)
Dept: LAB | Facility: HOSPITAL | Age: 82
End: 2017-02-06

## 2017-02-06 DIAGNOSIS — N39.0 URINARY TRACT INFECTION: ICD-10-CM

## 2017-02-08 ENCOUNTER — HOSPITAL ENCOUNTER (INPATIENT)
Facility: HOSPITAL | Age: 82
LOS: 4 days | Discharge: NURSING, REHAB OR LONG-TERM CARE | End: 2017-02-12
Attending: EMERGENCY MEDICINE | Admitting: INTERNAL MEDICINE

## 2017-02-08 ENCOUNTER — APPOINTMENT (OUTPATIENT)
Dept: GENERAL RADIOLOGY | Facility: HOSPITAL | Age: 82
End: 2017-02-08

## 2017-02-08 DIAGNOSIS — M62.81 MUSCLE WEAKNESS (GENERALIZED): ICD-10-CM

## 2017-02-08 DIAGNOSIS — R41.82 ALTERED MENTAL STATUS, UNSPECIFIED ALTERED MENTAL STATUS TYPE: ICD-10-CM

## 2017-02-08 DIAGNOSIS — E87.1 HYPONATREMIA: Primary | ICD-10-CM

## 2017-02-08 DIAGNOSIS — S42.002A CLOSED FRACTURE OF LEFT CLAVICLE, UNSPECIFIED PART OF CLAVICLE, INITIAL ENCOUNTER: ICD-10-CM

## 2017-02-08 DIAGNOSIS — D64.9 ANEMIA, UNSPECIFIED TYPE: ICD-10-CM

## 2017-02-08 LAB
ALBUMIN SERPL-MCNC: 4.1 G/DL (ref 3.5–5.2)
ALBUMIN/GLOB SERPL: 1.9 G/DL
ALP SERPL-CCNC: 102 U/L (ref 39–117)
ALT SERPL W P-5'-P-CCNC: 15 U/L (ref 1–33)
ANION GAP SERPL CALCULATED.3IONS-SCNC: 11.6 MMOL/L
ANION GAP SERPL CALCULATED.3IONS-SCNC: 13.2 MMOL/L
ANION GAP SERPL CALCULATED.3IONS-SCNC: 14.2 MMOL/L
AST SERPL-CCNC: 23 U/L (ref 1–32)
BACTERIA SPEC AEROBE CULT: ABNORMAL
BACTERIA UR QL AUTO: ABNORMAL /HPF
BASOPHILS # BLD AUTO: 0.01 10*3/MM3 (ref 0–0.2)
BASOPHILS NFR BLD AUTO: 0.2 % (ref 0–1.5)
BILIRUB SERPL-MCNC: 0.6 MG/DL (ref 0.1–1.2)
BILIRUB UR QL STRIP: NEGATIVE
BUN BLD-MCNC: 6 MG/DL (ref 8–23)
BUN BLD-MCNC: 7 MG/DL (ref 8–23)
BUN BLD-MCNC: 9 MG/DL (ref 8–23)
BUN/CREAT SERPL: 17.1 (ref 7–25)
BUN/CREAT SERPL: 17.1 (ref 7–25)
BUN/CREAT SERPL: 19.6 (ref 7–25)
BURR CELLS BLD QL SMEAR: NORMAL
CALCIUM SPEC-SCNC: 8.4 MG/DL (ref 8.6–10.5)
CALCIUM SPEC-SCNC: 8.5 MG/DL (ref 8.6–10.5)
CALCIUM SPEC-SCNC: 8.8 MG/DL (ref 8.6–10.5)
CHLORIDE SERPL-SCNC: 75 MMOL/L (ref 98–107)
CHLORIDE SERPL-SCNC: 80 MMOL/L (ref 98–107)
CHLORIDE SERPL-SCNC: 82 MMOL/L (ref 98–107)
CK MB SERPL-CCNC: 2.98 NG/ML
CK SERPL-CCNC: 66 U/L (ref 20–180)
CLARITY UR: CLEAR
CO2 SERPL-SCNC: 21.8 MMOL/L (ref 22–29)
CO2 SERPL-SCNC: 21.8 MMOL/L (ref 22–29)
CO2 SERPL-SCNC: 22.4 MMOL/L (ref 22–29)
COLOR UR: YELLOW
CORTIS SERPL-MCNC: 17.23 MCG/DL
CREAT BLD-MCNC: 0.35 MG/DL (ref 0.57–1)
CREAT BLD-MCNC: 0.41 MG/DL (ref 0.57–1)
CREAT BLD-MCNC: 0.46 MG/DL (ref 0.57–1)
DEPRECATED RDW RBC AUTO: 49.4 FL (ref 37–54)
EOSINOPHIL # BLD AUTO: 0.02 10*3/MM3 (ref 0–0.7)
EOSINOPHIL NFR BLD AUTO: 0.3 % (ref 0.3–6.2)
ERYTHROCYTE [DISTWIDTH] IN BLOOD BY AUTOMATED COUNT: 15 % (ref 11.7–13)
GFR SERPL CREATININE-BSD FRML MDRD: 129 ML/MIN/1.73
GFR SERPL CREATININE-BSD FRML MDRD: 147 ML/MIN/1.73
GFR SERPL CREATININE-BSD FRML MDRD: >150 ML/MIN/1.73
GLOBULIN UR ELPH-MCNC: 2.2 GM/DL
GLUCOSE BLD-MCNC: 106 MG/DL (ref 65–99)
GLUCOSE BLD-MCNC: 110 MG/DL (ref 65–99)
GLUCOSE BLD-MCNC: 123 MG/DL (ref 65–99)
GLUCOSE BLDC GLUCOMTR-MCNC: 121 MG/DL (ref 70–130)
GLUCOSE BLDC GLUCOMTR-MCNC: 122 MG/DL (ref 70–130)
GLUCOSE UR STRIP-MCNC: NEGATIVE MG/DL
HCT VFR BLD AUTO: 24.4 % (ref 35.6–45.5)
HGB BLD-MCNC: 8.6 G/DL (ref 11.9–15.5)
HGB UR QL STRIP.AUTO: NEGATIVE
HOLD SPECIMEN: NORMAL
HOLD SPECIMEN: NORMAL
HYALINE CASTS UR QL AUTO: ABNORMAL /LPF
IMM GRANULOCYTES # BLD: 0.05 10*3/MM3 (ref 0–0.03)
IMM GRANULOCYTES NFR BLD: 0.8 % (ref 0–0.5)
KETONES UR QL STRIP: ABNORMAL
LEUKOCYTE ESTERASE UR QL STRIP.AUTO: ABNORMAL
LIPASE SERPL-CCNC: 19 U/L (ref 13–60)
LYMPHOCYTES # BLD AUTO: 0.72 10*3/MM3 (ref 0.9–4.8)
LYMPHOCYTES NFR BLD AUTO: 11.4 % (ref 19.6–45.3)
MAGNESIUM SERPL-MCNC: 1.9 MG/DL (ref 1.6–2.4)
MCH RBC QN AUTO: 31.9 PG (ref 26.9–32)
MCHC RBC AUTO-ENTMCNC: 35.2 G/DL (ref 32.4–36.3)
MCV RBC AUTO: 90.4 FL (ref 80.5–98.2)
MONOCYTES # BLD AUTO: 0.85 10*3/MM3 (ref 0.2–1.2)
MONOCYTES NFR BLD AUTO: 13.4 % (ref 5–12)
NEUTROPHILS # BLD AUTO: 4.68 10*3/MM3 (ref 1.9–8.1)
NEUTROPHILS NFR BLD AUTO: 73.9 % (ref 42.7–76)
NITRITE UR QL STRIP: NEGATIVE
NRBC BLD MANUAL-RTO: 0 /100 WBC (ref 0–0)
OSMOLALITY SERPL: 230 MOSM/KG (ref 285–295)
OSMOLALITY UR: 416 MOSM/KG (ref 250–1200)
PH UR STRIP.AUTO: 7 [PH] (ref 5–8)
PHOSPHATE SERPL-MCNC: 2.6 MG/DL (ref 2.5–4.5)
PLAT MORPH BLD: NORMAL
PLATELET # BLD AUTO: 287 10*3/MM3 (ref 140–500)
PMV BLD AUTO: 8.7 FL (ref 6–12)
POTASSIUM BLD-SCNC: 4 MMOL/L (ref 3.5–5.2)
POTASSIUM BLD-SCNC: 4.2 MMOL/L (ref 3.5–5.2)
POTASSIUM BLD-SCNC: 4.6 MMOL/L (ref 3.5–5.2)
POTASSIUM UR-SCNC: 43.8 MMOL/L
PROT SERPL-MCNC: 6.3 G/DL (ref 6–8.5)
PROT UR QL STRIP: NEGATIVE
RBC # BLD AUTO: 2.7 10*6/MM3 (ref 3.9–5.2)
RBC # UR: ABNORMAL /HPF
REF LAB TEST METHOD: ABNORMAL
SODIUM BLD-SCNC: 109 MMOL/L (ref 136–145)
SODIUM BLD-SCNC: 116 MMOL/L (ref 136–145)
SODIUM BLD-SCNC: 117 MMOL/L (ref 136–145)
SODIUM UR-SCNC: 90 MMOL/L
SP GR UR STRIP: 1.01 (ref 1–1.03)
SQUAMOUS #/AREA URNS HPF: ABNORMAL /HPF
T4 FREE SERPL-MCNC: 1.41 NG/DL (ref 0.93–1.7)
TROPONIN T SERPL-MCNC: <0.01 NG/ML (ref 0–0.03)
TSH SERPL DL<=0.05 MIU/L-ACNC: 1.96 MIU/ML (ref 0.27–4.2)
UROBILINOGEN UR QL STRIP: ABNORMAL
WBC MORPH BLD: NORMAL
WBC NRBC COR # BLD: 6.33 10*3/MM3 (ref 4.5–10.7)
WBC UR QL AUTO: ABNORMAL /HPF
WHOLE BLOOD HOLD SPECIMEN: NORMAL
WHOLE BLOOD HOLD SPECIMEN: NORMAL

## 2017-02-08 PROCEDURE — 83935 ASSAY OF URINE OSMOLALITY: CPT | Performed by: EMERGENCY MEDICINE

## 2017-02-08 PROCEDURE — 82962 GLUCOSE BLOOD TEST: CPT

## 2017-02-08 PROCEDURE — 87086 URINE CULTURE/COLONY COUNT: CPT | Performed by: EMERGENCY MEDICINE

## 2017-02-08 PROCEDURE — 83690 ASSAY OF LIPASE: CPT | Performed by: EMERGENCY MEDICINE

## 2017-02-08 PROCEDURE — 71020 HC CHEST PA AND LATERAL: CPT

## 2017-02-08 PROCEDURE — 84133 ASSAY OF URINE POTASSIUM: CPT | Performed by: EMERGENCY MEDICINE

## 2017-02-08 PROCEDURE — 82550 ASSAY OF CK (CPK): CPT | Performed by: EMERGENCY MEDICINE

## 2017-02-08 PROCEDURE — 80053 COMPREHEN METABOLIC PANEL: CPT | Performed by: EMERGENCY MEDICINE

## 2017-02-08 PROCEDURE — 83930 ASSAY OF BLOOD OSMOLALITY: CPT | Performed by: EMERGENCY MEDICINE

## 2017-02-08 PROCEDURE — 73030 X-RAY EXAM OF SHOULDER: CPT

## 2017-02-08 PROCEDURE — 85007 BL SMEAR W/DIFF WBC COUNT: CPT | Performed by: EMERGENCY MEDICINE

## 2017-02-08 PROCEDURE — 84484 ASSAY OF TROPONIN QUANT: CPT | Performed by: EMERGENCY MEDICINE

## 2017-02-08 PROCEDURE — 25010000002 ONDANSETRON PER 1 MG: Performed by: INTERNAL MEDICINE

## 2017-02-08 PROCEDURE — 99285 EMERGENCY DEPT VISIT HI MDM: CPT

## 2017-02-08 PROCEDURE — 82533 TOTAL CORTISOL: CPT | Performed by: INTERNAL MEDICINE

## 2017-02-08 PROCEDURE — 81001 URINALYSIS AUTO W/SCOPE: CPT | Performed by: EMERGENCY MEDICINE

## 2017-02-08 PROCEDURE — 84100 ASSAY OF PHOSPHORUS: CPT | Performed by: EMERGENCY MEDICINE

## 2017-02-08 PROCEDURE — 25010000003 CEFTRIAXONE PER 250 MG: Performed by: UROLOGY

## 2017-02-08 PROCEDURE — 84300 ASSAY OF URINE SODIUM: CPT | Performed by: EMERGENCY MEDICINE

## 2017-02-08 PROCEDURE — 94640 AIRWAY INHALATION TREATMENT: CPT

## 2017-02-08 PROCEDURE — 84443 ASSAY THYROID STIM HORMONE: CPT | Performed by: EMERGENCY MEDICINE

## 2017-02-08 PROCEDURE — 93005 ELECTROCARDIOGRAM TRACING: CPT | Performed by: EMERGENCY MEDICINE

## 2017-02-08 PROCEDURE — 83735 ASSAY OF MAGNESIUM: CPT | Performed by: EMERGENCY MEDICINE

## 2017-02-08 PROCEDURE — 93010 ELECTROCARDIOGRAM REPORT: CPT | Performed by: INTERNAL MEDICINE

## 2017-02-08 PROCEDURE — 84439 ASSAY OF FREE THYROXINE: CPT | Performed by: EMERGENCY MEDICINE

## 2017-02-08 PROCEDURE — 85025 COMPLETE CBC W/AUTO DIFF WBC: CPT | Performed by: EMERGENCY MEDICINE

## 2017-02-08 PROCEDURE — 94799 UNLISTED PULMONARY SVC/PX: CPT

## 2017-02-08 PROCEDURE — 82553 CREATINE MB FRACTION: CPT | Performed by: EMERGENCY MEDICINE

## 2017-02-08 RX ORDER — ATORVASTATIN CALCIUM 40 MG/1
40 TABLET, FILM COATED ORAL NIGHTLY
Status: DISCONTINUED | OUTPATIENT
Start: 2017-02-08 | End: 2017-02-12 | Stop reason: HOSPADM

## 2017-02-08 RX ORDER — CEFTRIAXONE SODIUM 1 G/50ML
1 INJECTION, SOLUTION INTRAVENOUS EVERY 24 HOURS
Status: DISCONTINUED | OUTPATIENT
Start: 2017-02-08 | End: 2017-02-11

## 2017-02-08 RX ORDER — BUDESONIDE AND FORMOTEROL FUMARATE DIHYDRATE 160; 4.5 UG/1; UG/1
2 AEROSOL RESPIRATORY (INHALATION)
Status: DISCONTINUED | OUTPATIENT
Start: 2017-02-08 | End: 2017-02-12 | Stop reason: HOSPADM

## 2017-02-08 RX ORDER — MELATONIN
1000 DAILY
Status: DISCONTINUED | OUTPATIENT
Start: 2017-02-08 | End: 2017-02-12 | Stop reason: HOSPADM

## 2017-02-08 RX ORDER — SODIUM CHLORIDE 9 MG/ML
100 INJECTION, SOLUTION INTRAVENOUS CONTINUOUS
Status: DISCONTINUED | OUTPATIENT
Start: 2017-02-09 | End: 2017-02-10

## 2017-02-08 RX ORDER — 3% SODIUM CHLORIDE 3 G/100ML
50 INJECTION, SOLUTION INTRAVENOUS CONTINUOUS
Status: DISCONTINUED | OUTPATIENT
Start: 2017-02-08 | End: 2017-02-08 | Stop reason: SDUPTHER

## 2017-02-08 RX ORDER — CEPHALEXIN 500 MG/1
500 CAPSULE ORAL 3 TIMES DAILY
COMMUNITY
End: 2017-02-12 | Stop reason: HOSPADM

## 2017-02-08 RX ORDER — IPRATROPIUM BROMIDE AND ALBUTEROL SULFATE 2.5; .5 MG/3ML; MG/3ML
3 SOLUTION RESPIRATORY (INHALATION) EVERY 4 HOURS PRN
Status: DISCONTINUED | OUTPATIENT
Start: 2017-02-08 | End: 2017-02-12 | Stop reason: HOSPADM

## 2017-02-08 RX ORDER — PANTOPRAZOLE SODIUM 40 MG/1
40 TABLET, DELAYED RELEASE ORAL DAILY
Status: DISCONTINUED | OUTPATIENT
Start: 2017-02-08 | End: 2017-02-12 | Stop reason: HOSPADM

## 2017-02-08 RX ORDER — LOSARTAN POTASSIUM 50 MG/1
50 TABLET ORAL 2 TIMES DAILY
Status: DISCONTINUED | OUTPATIENT
Start: 2017-02-08 | End: 2017-02-12 | Stop reason: HOSPADM

## 2017-02-08 RX ORDER — SULFAMETHOXAZOLE AND TRIMETHOPRIM 800; 160 MG/1; MG/1
1 TABLET ORAL 2 TIMES DAILY
COMMUNITY
End: 2017-02-12 | Stop reason: HOSPADM

## 2017-02-08 RX ORDER — 3% SODIUM CHLORIDE 3 G/100ML
50 INJECTION, SOLUTION INTRAVENOUS ONCE
Status: DISCONTINUED | OUTPATIENT
Start: 2017-02-08 | End: 2017-02-08 | Stop reason: SDUPTHER

## 2017-02-08 RX ORDER — 3% SODIUM CHLORIDE 3 G/100ML
50 INJECTION, SOLUTION INTRAVENOUS CONTINUOUS
Status: DISCONTINUED | OUTPATIENT
Start: 2017-02-08 | End: 2017-02-08

## 2017-02-08 RX ORDER — ATORVASTATIN CALCIUM 40 MG/1
40 TABLET, FILM COATED ORAL DAILY
Status: DISCONTINUED | OUTPATIENT
Start: 2017-02-08 | End: 2017-02-08 | Stop reason: SDUPTHER

## 2017-02-08 RX ORDER — SODIUM CHLORIDE 0.9 % (FLUSH) 0.9 %
10 SYRINGE (ML) INJECTION AS NEEDED
Status: DISCONTINUED | OUTPATIENT
Start: 2017-02-08 | End: 2017-02-12 | Stop reason: HOSPADM

## 2017-02-08 RX ORDER — VORICONAZOLE 200 MG/1
200 TABLET, FILM COATED ORAL 2 TIMES DAILY
Status: DISCONTINUED | OUTPATIENT
Start: 2017-02-08 | End: 2017-02-12 | Stop reason: HOSPADM

## 2017-02-08 RX ORDER — ONDANSETRON 2 MG/ML
4 INJECTION INTRAMUSCULAR; INTRAVENOUS EVERY 4 HOURS PRN
Status: DISCONTINUED | OUTPATIENT
Start: 2017-02-08 | End: 2017-02-12 | Stop reason: HOSPADM

## 2017-02-08 RX ORDER — 3% SODIUM CHLORIDE 3 G/100ML
50 INJECTION, SOLUTION INTRAVENOUS CONTINUOUS
Status: DISPENSED | OUTPATIENT
Start: 2017-02-08 | End: 2017-02-08

## 2017-02-08 RX ORDER — DABIGATRAN ETEXILATE 150 MG/1
150 CAPSULE ORAL EVERY 12 HOURS SCHEDULED
Status: DISCONTINUED | OUTPATIENT
Start: 2017-02-08 | End: 2017-02-12 | Stop reason: HOSPADM

## 2017-02-08 RX ORDER — CETIRIZINE HYDROCHLORIDE 10 MG/1
10 TABLET ORAL DAILY
Status: DISCONTINUED | OUTPATIENT
Start: 2017-02-08 | End: 2017-02-12 | Stop reason: HOSPADM

## 2017-02-08 RX ORDER — DILTIAZEM HYDROCHLORIDE 180 MG/1
180 CAPSULE, COATED, EXTENDED RELEASE ORAL DAILY
Status: DISCONTINUED | OUTPATIENT
Start: 2017-02-08 | End: 2017-02-12 | Stop reason: HOSPADM

## 2017-02-08 RX ADMIN — PANTOPRAZOLE SODIUM 40 MG: 40 TABLET, DELAYED RELEASE ORAL at 13:44

## 2017-02-08 RX ADMIN — LOSARTAN POTASSIUM 50 MG: 50 TABLET, FILM COATED ORAL at 13:35

## 2017-02-08 RX ADMIN — BUDESONIDE AND FORMOTEROL FUMARATE DIHYDRATE 2 PUFF: 160; 4.5 AEROSOL RESPIRATORY (INHALATION) at 19:25

## 2017-02-08 RX ADMIN — DILTIAZEM HYDROCHLORIDE 180 MG: 180 CAPSULE, COATED, EXTENDED RELEASE ORAL at 13:38

## 2017-02-08 RX ADMIN — VORICONAZOLE 200 MG: 200 TABLET, FILM COATED ORAL at 20:48

## 2017-02-08 RX ADMIN — CEFTRIAXONE SODIUM 1 G: 1 INJECTION, SOLUTION INTRAVENOUS at 13:33

## 2017-02-08 RX ADMIN — ATORVASTATIN CALCIUM 40 MG: 40 TABLET, FILM COATED ORAL at 20:47

## 2017-02-08 RX ADMIN — DABIGATRAN ETEXILATE MESYLATE 150 MG: 150 CAPSULE ORAL at 20:47

## 2017-02-08 RX ADMIN — SODIUM CHLORIDE 50 ML/HR: 3 INJECTION, SOLUTION INTRAVENOUS at 10:01

## 2017-02-08 RX ADMIN — CETIRIZINE HYDROCHLORIDE 10 MG: 10 TABLET, FILM COATED ORAL at 13:34

## 2017-02-08 RX ADMIN — VITAMIN D, TAB 1000IU (100/BT) 1000 UNITS: 25 TAB at 13:34

## 2017-02-08 RX ADMIN — SODIUM CHLORIDE 500 ML: 9 INJECTION, SOLUTION INTRAVENOUS at 07:55

## 2017-02-08 RX ADMIN — ONDANSETRON 4 MG: 2 INJECTION INTRAMUSCULAR; INTRAVENOUS at 20:21

## 2017-02-09 LAB
ANION GAP SERPL CALCULATED.3IONS-SCNC: 13.6 MMOL/L
ANION GAP SERPL CALCULATED.3IONS-SCNC: 8.8 MMOL/L
BACTERIA SPEC AEROBE CULT: NO GROWTH
BUN BLD-MCNC: 6 MG/DL (ref 8–23)
BUN BLD-MCNC: 6 MG/DL (ref 8–23)
BUN/CREAT SERPL: 12.5 (ref 7–25)
BUN/CREAT SERPL: 14 (ref 7–25)
CALCIUM SPEC-SCNC: 8.4 MG/DL (ref 8.6–10.5)
CALCIUM SPEC-SCNC: 8.4 MG/DL (ref 8.6–10.5)
CHLORIDE SERPL-SCNC: 83 MMOL/L (ref 98–107)
CHLORIDE SERPL-SCNC: 86 MMOL/L (ref 98–107)
CHLORIDE UR-SCNC: <20 MMOL/L
CO2 SERPL-SCNC: 22.4 MMOL/L (ref 22–29)
CO2 SERPL-SCNC: 26.2 MMOL/L (ref 22–29)
CREAT BLD-MCNC: 0.43 MG/DL (ref 0.57–1)
CREAT BLD-MCNC: 0.48 MG/DL (ref 0.57–1)
GFR SERPL CREATININE-BSD FRML MDRD: 123 ML/MIN/1.73
GFR SERPL CREATININE-BSD FRML MDRD: 139 ML/MIN/1.73
GLUCOSE BLD-MCNC: 101 MG/DL (ref 65–99)
GLUCOSE BLD-MCNC: 102 MG/DL (ref 65–99)
OSMOLALITY UR: 317 MOSM/KG (ref 250–1200)
POTASSIUM BLD-SCNC: 4 MMOL/L (ref 3.5–5.2)
POTASSIUM BLD-SCNC: 4.1 MMOL/L (ref 3.5–5.2)
SODIUM BLD-SCNC: 119 MMOL/L (ref 136–145)
SODIUM BLD-SCNC: 121 MMOL/L (ref 136–145)
SODIUM UR-SCNC: 20 MMOL/L

## 2017-02-09 PROCEDURE — 84300 ASSAY OF URINE SODIUM: CPT | Performed by: INTERNAL MEDICINE

## 2017-02-09 PROCEDURE — 80048 BASIC METABOLIC PNL TOTAL CA: CPT | Performed by: INTERNAL MEDICINE

## 2017-02-09 PROCEDURE — 94640 AIRWAY INHALATION TREATMENT: CPT

## 2017-02-09 PROCEDURE — 83935 ASSAY OF URINE OSMOLALITY: CPT | Performed by: INTERNAL MEDICINE

## 2017-02-09 PROCEDURE — 94799 UNLISTED PULMONARY SVC/PX: CPT

## 2017-02-09 PROCEDURE — 25010000003 CEFTRIAXONE PER 250 MG: Performed by: UROLOGY

## 2017-02-09 PROCEDURE — 82436 ASSAY OF URINE CHLORIDE: CPT | Performed by: INTERNAL MEDICINE

## 2017-02-09 RX ORDER — CLOTRIMAZOLE AND BETAMETHASONE DIPROPIONATE 10; .64 MG/G; MG/G
CREAM TOPICAL EVERY 12 HOURS SCHEDULED
Status: DISCONTINUED | OUTPATIENT
Start: 2017-02-09 | End: 2017-02-12 | Stop reason: HOSPADM

## 2017-02-09 RX ORDER — IPRATROPIUM BROMIDE AND ALBUTEROL SULFATE 2.5; .5 MG/3ML; MG/3ML
3 SOLUTION RESPIRATORY (INHALATION)
Status: DISCONTINUED | OUTPATIENT
Start: 2017-02-09 | End: 2017-02-12 | Stop reason: HOSPADM

## 2017-02-09 RX ADMIN — VORICONAZOLE 200 MG: 200 TABLET, FILM COATED ORAL at 06:22

## 2017-02-09 RX ADMIN — IPRATROPIUM BROMIDE AND ALBUTEROL SULFATE 3 ML: .5; 3 SOLUTION RESPIRATORY (INHALATION) at 19:02

## 2017-02-09 RX ADMIN — IPRATROPIUM BROMIDE AND ALBUTEROL SULFATE 3 ML: .5; 3 SOLUTION RESPIRATORY (INHALATION) at 12:46

## 2017-02-09 RX ADMIN — ATORVASTATIN CALCIUM 40 MG: 40 TABLET, FILM COATED ORAL at 22:10

## 2017-02-09 RX ADMIN — SODIUM CHLORIDE 100 ML/HR: 9 INJECTION, SOLUTION INTRAVENOUS at 00:01

## 2017-02-09 RX ADMIN — BUDESONIDE AND FORMOTEROL FUMARATE DIHYDRATE 2 PUFF: 160; 4.5 AEROSOL RESPIRATORY (INHALATION) at 19:02

## 2017-02-09 RX ADMIN — DABIGATRAN ETEXILATE MESYLATE 150 MG: 150 CAPSULE ORAL at 08:55

## 2017-02-09 RX ADMIN — DABIGATRAN ETEXILATE MESYLATE 150 MG: 150 CAPSULE ORAL at 22:10

## 2017-02-09 RX ADMIN — DILTIAZEM HYDROCHLORIDE 180 MG: 180 CAPSULE, COATED, EXTENDED RELEASE ORAL at 08:55

## 2017-02-09 RX ADMIN — CETIRIZINE HYDROCHLORIDE 10 MG: 10 TABLET, FILM COATED ORAL at 08:55

## 2017-02-09 RX ADMIN — SODIUM CHLORIDE 100 ML/HR: 9 INJECTION, SOLUTION INTRAVENOUS at 08:08

## 2017-02-09 RX ADMIN — CLOTRIMAZOLE AND BETAMETHASONE DIPROPIONATE: 10; .5 CREAM TOPICAL at 20:02

## 2017-02-09 RX ADMIN — LOSARTAN POTASSIUM 50 MG: 50 TABLET, FILM COATED ORAL at 08:55

## 2017-02-09 RX ADMIN — BUDESONIDE AND FORMOTEROL FUMARATE DIHYDRATE 2 PUFF: 160; 4.5 AEROSOL RESPIRATORY (INHALATION) at 11:18

## 2017-02-09 RX ADMIN — VORICONAZOLE 200 MG: 200 TABLET, FILM COATED ORAL at 20:01

## 2017-02-09 RX ADMIN — VITAMIN D, TAB 1000IU (100/BT) 1000 UNITS: 25 TAB at 08:55

## 2017-02-09 RX ADMIN — PANTOPRAZOLE SODIUM 40 MG: 40 TABLET, DELAYED RELEASE ORAL at 06:21

## 2017-02-09 RX ADMIN — CEFTRIAXONE SODIUM 1 G: 1 INJECTION, SOLUTION INTRAVENOUS at 13:56

## 2017-02-09 RX ADMIN — LOSARTAN POTASSIUM 50 MG: 50 TABLET, FILM COATED ORAL at 20:01

## 2017-02-10 LAB
ANION GAP SERPL CALCULATED.3IONS-SCNC: 11.3 MMOL/L
BUN BLD-MCNC: 7 MG/DL (ref 8–23)
BUN/CREAT SERPL: 16.3 (ref 7–25)
CALCIUM SPEC-SCNC: 8.1 MG/DL (ref 8.6–10.5)
CHLORIDE SERPL-SCNC: 92 MMOL/L (ref 98–107)
CO2 SERPL-SCNC: 22.7 MMOL/L (ref 22–29)
CREAT BLD-MCNC: 0.43 MG/DL (ref 0.57–1)
GFR SERPL CREATININE-BSD FRML MDRD: 139 ML/MIN/1.73
GLUCOSE BLD-MCNC: 112 MG/DL (ref 65–99)
POTASSIUM BLD-SCNC: 4.1 MMOL/L (ref 3.5–5.2)
SODIUM BLD-SCNC: 126 MMOL/L (ref 136–145)

## 2017-02-10 PROCEDURE — 97535 SELF CARE MNGMENT TRAINING: CPT

## 2017-02-10 PROCEDURE — 97110 THERAPEUTIC EXERCISES: CPT

## 2017-02-10 PROCEDURE — 97165 OT EVAL LOW COMPLEX 30 MIN: CPT

## 2017-02-10 PROCEDURE — 97161 PT EVAL LOW COMPLEX 20 MIN: CPT

## 2017-02-10 PROCEDURE — 94640 AIRWAY INHALATION TREATMENT: CPT

## 2017-02-10 PROCEDURE — 25010000003 CEFTRIAXONE PER 250 MG: Performed by: UROLOGY

## 2017-02-10 PROCEDURE — 94799 UNLISTED PULMONARY SVC/PX: CPT

## 2017-02-10 PROCEDURE — 80048 BASIC METABOLIC PNL TOTAL CA: CPT | Performed by: INTERNAL MEDICINE

## 2017-02-10 RX ADMIN — VITAMIN D, TAB 1000IU (100/BT) 1000 UNITS: 25 TAB at 08:29

## 2017-02-10 RX ADMIN — SODIUM CHLORIDE 100 ML/HR: 9 INJECTION, SOLUTION INTRAVENOUS at 05:31

## 2017-02-10 RX ADMIN — BUDESONIDE AND FORMOTEROL FUMARATE DIHYDRATE 2 PUFF: 160; 4.5 AEROSOL RESPIRATORY (INHALATION) at 20:19

## 2017-02-10 RX ADMIN — VORICONAZOLE 200 MG: 200 TABLET, FILM COATED ORAL at 17:21

## 2017-02-10 RX ADMIN — ATORVASTATIN CALCIUM 40 MG: 40 TABLET, FILM COATED ORAL at 21:00

## 2017-02-10 RX ADMIN — CLOTRIMAZOLE AND BETAMETHASONE DIPROPIONATE: 10; .5 CREAM TOPICAL at 21:00

## 2017-02-10 RX ADMIN — CEFTRIAXONE SODIUM 1 G: 1 INJECTION, SOLUTION INTRAVENOUS at 14:17

## 2017-02-10 RX ADMIN — IPRATROPIUM BROMIDE AND ALBUTEROL SULFATE 3 ML: .5; 3 SOLUTION RESPIRATORY (INHALATION) at 20:19

## 2017-02-10 RX ADMIN — BUDESONIDE AND FORMOTEROL FUMARATE DIHYDRATE 2 PUFF: 160; 4.5 AEROSOL RESPIRATORY (INHALATION) at 08:58

## 2017-02-10 RX ADMIN — CLOTRIMAZOLE AND BETAMETHASONE DIPROPIONATE: 10; .5 CREAM TOPICAL at 08:30

## 2017-02-10 RX ADMIN — PANTOPRAZOLE SODIUM 40 MG: 40 TABLET, DELAYED RELEASE ORAL at 08:29

## 2017-02-10 RX ADMIN — DABIGATRAN ETEXILATE MESYLATE 150 MG: 150 CAPSULE ORAL at 08:29

## 2017-02-10 RX ADMIN — DILTIAZEM HYDROCHLORIDE 180 MG: 180 CAPSULE, COATED, EXTENDED RELEASE ORAL at 08:29

## 2017-02-10 RX ADMIN — LOSARTAN POTASSIUM 50 MG: 50 TABLET, FILM COATED ORAL at 17:21

## 2017-02-10 RX ADMIN — DABIGATRAN ETEXILATE MESYLATE 150 MG: 150 CAPSULE ORAL at 21:00

## 2017-02-10 RX ADMIN — VORICONAZOLE 200 MG: 200 TABLET, FILM COATED ORAL at 08:29

## 2017-02-10 RX ADMIN — LOSARTAN POTASSIUM 50 MG: 50 TABLET, FILM COATED ORAL at 08:29

## 2017-02-10 RX ADMIN — CETIRIZINE HYDROCHLORIDE 10 MG: 10 TABLET, FILM COATED ORAL at 08:29

## 2017-02-10 RX ADMIN — IPRATROPIUM BROMIDE AND ALBUTEROL SULFATE 3 ML: .5; 3 SOLUTION RESPIRATORY (INHALATION) at 08:58

## 2017-02-11 LAB
ALBUMIN SERPL-MCNC: 3.5 G/DL (ref 3.5–5.2)
ANION GAP SERPL CALCULATED.3IONS-SCNC: 11.6 MMOL/L
BUN BLD-MCNC: 5 MG/DL (ref 8–23)
BUN/CREAT SERPL: 12.5 (ref 7–25)
CALCIUM SPEC-SCNC: 8.5 MG/DL (ref 8.6–10.5)
CHLORIDE SERPL-SCNC: 92 MMOL/L (ref 98–107)
CO2 SERPL-SCNC: 24.4 MMOL/L (ref 22–29)
CREAT BLD-MCNC: 0.4 MG/DL (ref 0.57–1)
DEPRECATED RDW RBC AUTO: 56.1 FL (ref 37–54)
ERYTHROCYTE [DISTWIDTH] IN BLOOD BY AUTOMATED COUNT: 15.6 % (ref 11.7–13)
GFR SERPL CREATININE-BSD FRML MDRD: >150 ML/MIN/1.73
GLUCOSE BLD-MCNC: 112 MG/DL (ref 65–99)
HCT VFR BLD AUTO: 27.8 % (ref 35.6–45.5)
HGB BLD-MCNC: 8.6 G/DL (ref 11.9–15.5)
MCH RBC QN AUTO: 30.5 PG (ref 26.9–32)
MCHC RBC AUTO-ENTMCNC: 30.9 G/DL (ref 32.4–36.3)
MCV RBC AUTO: 98.6 FL (ref 80.5–98.2)
PHOSPHATE SERPL-MCNC: 2.9 MG/DL (ref 2.5–4.5)
PLATELET # BLD AUTO: 239 10*3/MM3 (ref 140–500)
PMV BLD AUTO: 9.1 FL (ref 6–12)
POTASSIUM BLD-SCNC: 4 MMOL/L (ref 3.5–5.2)
RBC # BLD AUTO: 2.82 10*6/MM3 (ref 3.9–5.2)
SODIUM BLD-SCNC: 128 MMOL/L (ref 136–145)
WBC NRBC COR # BLD: 6.06 10*3/MM3 (ref 4.5–10.7)

## 2017-02-11 PROCEDURE — 97110 THERAPEUTIC EXERCISES: CPT

## 2017-02-11 PROCEDURE — 94640 AIRWAY INHALATION TREATMENT: CPT

## 2017-02-11 PROCEDURE — 80069 RENAL FUNCTION PANEL: CPT | Performed by: INTERNAL MEDICINE

## 2017-02-11 PROCEDURE — 85027 COMPLETE CBC AUTOMATED: CPT | Performed by: NURSE PRACTITIONER

## 2017-02-11 PROCEDURE — 94799 UNLISTED PULMONARY SVC/PX: CPT

## 2017-02-11 PROCEDURE — 25010000003 CEFTRIAXONE PER 250 MG: Performed by: UROLOGY

## 2017-02-11 RX ORDER — IPRATROPIUM BROMIDE AND ALBUTEROL SULFATE 2.5; .5 MG/3ML; MG/3ML
3 SOLUTION RESPIRATORY (INHALATION) EVERY 4 HOURS PRN
Qty: 360 ML | Refills: 5 | Status: CANCELLED
Start: 2017-02-11 | End: 2017-03-13

## 2017-02-11 RX ORDER — CEFDINIR 300 MG/1
300 CAPSULE ORAL EVERY 12 HOURS SCHEDULED
Status: DISCONTINUED | OUTPATIENT
Start: 2017-02-11 | End: 2017-02-12 | Stop reason: HOSPADM

## 2017-02-11 RX ORDER — CEFDINIR 300 MG/1
300 CAPSULE ORAL EVERY 12 HOURS SCHEDULED
Qty: 14 CAPSULE | Refills: 0 | Status: SHIPPED | OUTPATIENT
Start: 2017-02-11 | End: 2017-02-18

## 2017-02-11 RX ORDER — IPRATROPIUM BROMIDE AND ALBUTEROL SULFATE 2.5; .5 MG/3ML; MG/3ML
3 SOLUTION RESPIRATORY (INHALATION) EVERY 4 HOURS PRN
Qty: 360 ML | Refills: 5
Start: 2017-02-11 | End: 2017-03-20 | Stop reason: HOSPADM

## 2017-02-11 RX ADMIN — DABIGATRAN ETEXILATE MESYLATE 150 MG: 150 CAPSULE ORAL at 08:20

## 2017-02-11 RX ADMIN — CEFTRIAXONE SODIUM 1 G: 1 INJECTION, SOLUTION INTRAVENOUS at 13:34

## 2017-02-11 RX ADMIN — DABIGATRAN ETEXILATE MESYLATE 150 MG: 150 CAPSULE ORAL at 20:49

## 2017-02-11 RX ADMIN — CETIRIZINE HYDROCHLORIDE 10 MG: 10 TABLET, FILM COATED ORAL at 08:20

## 2017-02-11 RX ADMIN — VITAMIN D, TAB 1000IU (100/BT) 1000 UNITS: 25 TAB at 08:20

## 2017-02-11 RX ADMIN — LOSARTAN POTASSIUM 50 MG: 50 TABLET, FILM COATED ORAL at 17:49

## 2017-02-11 RX ADMIN — ATORVASTATIN CALCIUM 40 MG: 40 TABLET, FILM COATED ORAL at 20:49

## 2017-02-11 RX ADMIN — IPRATROPIUM BROMIDE AND ALBUTEROL SULFATE 3 ML: .5; 3 SOLUTION RESPIRATORY (INHALATION) at 21:52

## 2017-02-11 RX ADMIN — VORICONAZOLE 200 MG: 200 TABLET, FILM COATED ORAL at 17:49

## 2017-02-11 RX ADMIN — PANTOPRAZOLE SODIUM 40 MG: 40 TABLET, DELAYED RELEASE ORAL at 06:00

## 2017-02-11 RX ADMIN — BUDESONIDE AND FORMOTEROL FUMARATE DIHYDRATE 2 PUFF: 160; 4.5 AEROSOL RESPIRATORY (INHALATION) at 09:21

## 2017-02-11 RX ADMIN — DILTIAZEM HYDROCHLORIDE 180 MG: 180 CAPSULE, COATED, EXTENDED RELEASE ORAL at 08:20

## 2017-02-11 RX ADMIN — VORICONAZOLE 200 MG: 200 TABLET, FILM COATED ORAL at 05:57

## 2017-02-11 RX ADMIN — IPRATROPIUM BROMIDE AND ALBUTEROL SULFATE 3 ML: .5; 3 SOLUTION RESPIRATORY (INHALATION) at 09:21

## 2017-02-11 RX ADMIN — CLOTRIMAZOLE AND BETAMETHASONE DIPROPIONATE: 10; .5 CREAM TOPICAL at 21:00

## 2017-02-11 RX ADMIN — CEFDINIR 300 MG: 300 CAPSULE ORAL at 20:49

## 2017-02-11 RX ADMIN — BUDESONIDE AND FORMOTEROL FUMARATE DIHYDRATE 2 PUFF: 160; 4.5 AEROSOL RESPIRATORY (INHALATION) at 21:52

## 2017-02-11 RX ADMIN — LOSARTAN POTASSIUM 50 MG: 50 TABLET, FILM COATED ORAL at 08:20

## 2017-02-11 RX ADMIN — CLOTRIMAZOLE AND BETAMETHASONE DIPROPIONATE 1 APPLICATION: 10; .5 CREAM TOPICAL at 08:20

## 2017-02-12 VITALS
SYSTOLIC BLOOD PRESSURE: 122 MMHG | DIASTOLIC BLOOD PRESSURE: 71 MMHG | TEMPERATURE: 98 F | RESPIRATION RATE: 20 BRPM | BODY MASS INDEX: 22.15 KG/M2 | OXYGEN SATURATION: 96 % | WEIGHT: 125 LBS | HEART RATE: 89 BPM | HEIGHT: 63 IN

## 2017-02-12 LAB
ALBUMIN SERPL-MCNC: 3.7 G/DL (ref 3.5–5.2)
ANION GAP SERPL CALCULATED.3IONS-SCNC: 11.8 MMOL/L
BUN BLD-MCNC: 5 MG/DL (ref 8–23)
BUN/CREAT SERPL: 8.9 (ref 7–25)
CALCIUM SPEC-SCNC: 9 MG/DL (ref 8.6–10.5)
CHLORIDE SERPL-SCNC: 93 MMOL/L (ref 98–107)
CO2 SERPL-SCNC: 25.2 MMOL/L (ref 22–29)
CREAT BLD-MCNC: 0.56 MG/DL (ref 0.57–1)
GFR SERPL CREATININE-BSD FRML MDRD: 103 ML/MIN/1.73
GLUCOSE BLD-MCNC: 109 MG/DL (ref 65–99)
PHOSPHATE SERPL-MCNC: 2.9 MG/DL (ref 2.5–4.5)
POTASSIUM BLD-SCNC: 4.2 MMOL/L (ref 3.5–5.2)
SODIUM BLD-SCNC: 130 MMOL/L (ref 136–145)

## 2017-02-12 PROCEDURE — 94799 UNLISTED PULMONARY SVC/PX: CPT

## 2017-02-12 PROCEDURE — 80069 RENAL FUNCTION PANEL: CPT | Performed by: INTERNAL MEDICINE

## 2017-02-12 PROCEDURE — 94640 AIRWAY INHALATION TREATMENT: CPT

## 2017-02-12 RX ORDER — OXYCODONE HYDROCHLORIDE AND ACETAMINOPHEN 5; 325 MG/1; MG/1
1 TABLET ORAL ONCE
Status: COMPLETED | OUTPATIENT
Start: 2017-02-12 | End: 2017-02-12

## 2017-02-12 RX ORDER — ACYCLOVIR 400 MG/1
400 TABLET ORAL 3 TIMES DAILY
Qty: 21 TABLET | Refills: 0 | Status: SHIPPED | OUTPATIENT
Start: 2017-02-12 | End: 2017-02-19

## 2017-02-12 RX ORDER — GABAPENTIN 100 MG/1
100 CAPSULE ORAL 3 TIMES DAILY
Qty: 42 CAPSULE | Refills: 0 | Status: SHIPPED | OUTPATIENT
Start: 2017-02-12 | End: 2017-02-26

## 2017-02-12 RX ADMIN — DILTIAZEM HYDROCHLORIDE 180 MG: 180 CAPSULE, COATED, EXTENDED RELEASE ORAL at 09:30

## 2017-02-12 RX ADMIN — VITAMIN D, TAB 1000IU (100/BT) 1000 UNITS: 25 TAB at 09:30

## 2017-02-12 RX ADMIN — BUDESONIDE AND FORMOTEROL FUMARATE DIHYDRATE 2 PUFF: 160; 4.5 AEROSOL RESPIRATORY (INHALATION) at 08:49

## 2017-02-12 RX ADMIN — LOSARTAN POTASSIUM 50 MG: 50 TABLET, FILM COATED ORAL at 09:30

## 2017-02-12 RX ADMIN — DABIGATRAN ETEXILATE MESYLATE 150 MG: 150 CAPSULE ORAL at 09:30

## 2017-02-12 RX ADMIN — IPRATROPIUM BROMIDE AND ALBUTEROL SULFATE 3 ML: .5; 3 SOLUTION RESPIRATORY (INHALATION) at 08:49

## 2017-02-12 RX ADMIN — CEFDINIR 300 MG: 300 CAPSULE ORAL at 09:30

## 2017-02-12 RX ADMIN — CETIRIZINE HYDROCHLORIDE 10 MG: 10 TABLET, FILM COATED ORAL at 09:30

## 2017-02-12 RX ADMIN — PANTOPRAZOLE SODIUM 40 MG: 40 TABLET, DELAYED RELEASE ORAL at 06:44

## 2017-02-12 RX ADMIN — OXYCODONE HYDROCHLORIDE AND ACETAMINOPHEN 1 TABLET: 5; 325 TABLET ORAL at 16:02

## 2017-02-28 ENCOUNTER — OFFICE VISIT (OUTPATIENT)
Dept: CARDIOLOGY | Facility: CLINIC | Age: 82
End: 2017-02-28

## 2017-02-28 VITALS
SYSTOLIC BLOOD PRESSURE: 90 MMHG | BODY MASS INDEX: 23.14 KG/M2 | HEART RATE: 113 BPM | WEIGHT: 130.6 LBS | RESPIRATION RATE: 16 BRPM | HEIGHT: 63 IN | DIASTOLIC BLOOD PRESSURE: 60 MMHG

## 2017-02-28 DIAGNOSIS — I48.21 PERMANENT ATRIAL FIBRILLATION (HCC): Primary | ICD-10-CM

## 2017-02-28 DIAGNOSIS — E87.1 HYPONATREMIA: ICD-10-CM

## 2017-02-28 DIAGNOSIS — I25.10 CHRONIC CORONARY ARTERY DISEASE: ICD-10-CM

## 2017-02-28 DIAGNOSIS — I50.33 HEART FAILURE, DIASTOLIC, WITH ACUTE DECOMPENSATION (HCC): ICD-10-CM

## 2017-02-28 DIAGNOSIS — I10 BENIGN ESSENTIAL HYPERTENSION: ICD-10-CM

## 2017-02-28 DIAGNOSIS — I34.0 NON-RHEUMATIC MITRAL REGURGITATION: ICD-10-CM

## 2017-02-28 PROCEDURE — 93000 ELECTROCARDIOGRAM COMPLETE: CPT | Performed by: INTERNAL MEDICINE

## 2017-02-28 PROCEDURE — 99214 OFFICE O/P EST MOD 30 MIN: CPT | Performed by: INTERNAL MEDICINE

## 2017-02-28 RX ORDER — FUROSEMIDE 20 MG/1
20 TABLET ORAL 2 TIMES DAILY
COMMUNITY
End: 2017-04-11 | Stop reason: HOSPADM

## 2017-02-28 RX ORDER — DILTIAZEM HYDROCHLORIDE 240 MG/1
240 CAPSULE, COATED, EXTENDED RELEASE ORAL DAILY
Qty: 90 CAPSULE | Refills: 3 | Status: SHIPPED | OUTPATIENT
Start: 2017-02-28 | End: 2017-03-24 | Stop reason: HOSPADM

## 2017-02-28 RX ORDER — ACYCLOVIR 400 MG/1
400 TABLET ORAL 3 TIMES DAILY
COMMUNITY
End: 2017-03-20 | Stop reason: HOSPADM

## 2017-02-28 RX ORDER — DILTIAZEM HYDROCHLORIDE 240 MG/1
240 CAPSULE, COATED, EXTENDED RELEASE ORAL DAILY
Qty: 14 CAPSULE | Refills: 0 | Status: SHIPPED | OUTPATIENT
Start: 2017-02-28 | End: 2017-03-24 | Stop reason: HOSPADM

## 2017-02-28 NOTE — PROGRESS NOTES
PATIENTINFORMATION    Date of Office Visit: 2017  Encounter Provider: Marcie Robbins MD  Place of Service: [facility] CARDIOLOGY  Patient Name: Agnieszka Rizzo  : 1930    Subjective:     Encounter Date:2017      Patient ID: Agnieszka Rizzo is a 86 y.o. female.      History of Present Illness    This is a lady I met while she was hospitalized in 2016. She has a significant pulmonary history and was having a bronchoscopy and unfortunately developed a pneumothorax. She was found to be in rate -controlled atrial fibrillation. She had previously been seen by Dr. Ana Goodrich for history of hypertension, hyperlipidemia, mild mitral valve prolapse and nonobstructive coronary artery disease diagnosed by heart catheterization in .      Echocardiogram November 15, 2016:  · All left ventricular wall segments contract normally.  · Left ventricular function is normal. Estimated EF = 58%.  · Left atrial cavity size is moderately dilated.  · Mild tricuspid valve regurgitation is present.  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 They felt she would do well on oral anticoagulation and I started her on Pradaxa. I did not do a stress test while she was hospitalized because she was wheezing and I did not fill comfortable giving her Lexiscan at that time and I did not when he is dobutamine because of her atrial fibrillation.       She was able to have a stress as an outpt on 16 and this was normal. She continued to complain of dyspnea on exertion and so I had her set up for a cardioversion. She had a couple of hospitalizations in the meantime for respiratory issues.  While she was in the hospital in 2017, I attempted to cardiovert her. I shocked her 3 times that she did not remain in sinus rhythm. I spoke with her family and  we decided to continue with rate control and anticoagulation.     She was hospitalized in 02/2017.  She had started Voriconazole for treatment of pulmonary aspergillosis by Dr. Tucker.  She became very nauseous and sick, and threw up for an entire day.  She came into the hospital confused and weak. Her sodium was at 109.  She was found to have a left clavicle fracture from a fall.  She was discharged to [facility], where she has been.  She has been doing well with her physical therapy.   She does have some shortness of breath with it.  She denies any chest pain.  She generally does not feel the atrial fibrillation or any palpitations.  They noticed at [facility] that her heart rate was up over 100 for a couple of days in a row, and she was sent to see me.   She denies any lightheadedness.  She does admit to fatigue.  She has had worsening edema since she has been off of Lasix due to the low sodium.  She just saw her nephrologist yesterday and they restarted Lasix.  She is supposed to go back in for repeat blood work for them.        Review of Systems   Constitution: Negative for fever, malaise/fatigue, weight gain and weight loss.   HENT: Negative for ear pain, hearing loss, nosebleeds and sore throat.    Eyes: Negative for double vision, pain, vision loss in left eye and vision loss in right eye.   Cardiovascular: Positive for leg swelling.        See history of present illness.   Respiratory: Positive for cough and shortness of breath. Negative for sleep disturbances due to breathing, snoring and wheezing.    Endocrine: Negative for cold intolerance, heat intolerance and polyuria.   Skin: Negative for itching, poor wound healing and rash.   Musculoskeletal: Negative for joint pain, joint swelling and myalgias.   Gastrointestinal: Negative for abdominal pain, diarrhea, hematochezia, nausea and vomiting.   Genitourinary: Negative for hematuria and hesitancy.   Neurological: Negative for numbness, paresthesias and  "seizures.   Psychiatric/Behavioral: Negative for depression. The patient is not nervous/anxious.            ECG 12 Lead  Date/Time: 2/28/2017 12:30 PM  Performed by: ANGELI STODDARD  Authorized by: ANGELI STODDARD   Comparison: compared with previous ECG from 2/8/2017  Rhythm: atrial fibrillation  BPM: 113  Conduction: conduction normal  ST Segments: ST segments normal  Clinical impression: abnormal ECG               Objective:       Visit Vitals   • BP 90/60 (BP Location: Right arm, Patient Position: Sitting, Cuff Size: Adult)   • Pulse 113   • Resp 16   • Ht 63\" (160 cm)   • Wt 130 lb 9.6 oz (59.2 kg)   • BMI 23.13 kg/m2    Body mass index is 23.13 kg/(m^2).     Physical Exam   Constitutional: She appears well-developed.   HENT:   Head: Normocephalic and atraumatic.   Eyes: Conjunctivae and lids are normal. Pupils are equal, round, and reactive to light. Lids are everted and swept, no foreign bodies found.   Neck: Normal range of motion. No JVD present. Carotid bruit is not present. No tracheal deviation present. No thyroid mass present.   Cardiovascular: Normal heart sounds.  An irregularly irregular rhythm present. Tachycardia present.    Pulses:       Dorsalis pedis pulses are 2+ on the right side, and 2+ on the left side.   She has 1-2+ edema in both lower extremities   Pulmonary/Chest: Effort normal and breath sounds normal.   Abdominal: Normal appearance and bowel sounds are normal.   Musculoskeletal: Normal range of motion.   Neurological: She is alert. She has normal strength.   Skin: Skin is warm, dry and intact.   Psychiatric: She has a normal mood and affect. Her behavior is normal.   Vitals reviewed.      Lab Review:  I reviewed the lab work from her recent hospitalization      Assessment/Plan:       1. Atrial fibrillation.  She failed cardioversion in January 2017. She has a CHADS2-Vasc score of 5.  I am going to continue with rate control and anticoagulation. She is tolerating Pradaxa well.  Her " blood pressure at the nursing home looks good.  I'm going to increase the diltiazem to 240 mg a day. I am avoiding a beta blocker because of her significant lung disease.    Atrial Fibrillation and Atrial Flutter  Assessment  • The patient has permanent atrial fibrillation  • This is non-valvular in etiology  • The patient's CHADS2-VASc score is 5  • A [id number]-DMGm score of 2 or more is considered a high risk for a thromboembolic event  • Dabigatran prescribed    Plan  • Continue in atrial fibrillation with rate control  • Continue dabigatran for antithrombotic therapy, bleeding issues discussed  • Continue diltiazem for rate control    2. Diastolic heart failure.  She was recently restarted on diuretics therapy.  This is being monitored by her nephrologist.  3. Dyspnea on exertion. Multifactorial  4. Mitral valve prolapse with very mild mitral regurgitation.  5. Mild tricuspid regurgitation without pulmonary hypertension.  6. Nonobstructive coronary artery disease diagnosed by catheter in 2007. She has noted coronary artery calcification on CT scans. Nuclear stress 12/16 was normal.   7. Hypertension. Her blood pressure is controlled.  8. Hyperlipidemia. Zetia and atorvastatin  9. Hyponatremia. Stable.       I have asked her to check her heart rate and blood pressure at home since she is leaving rehabilitation tomorrow.  I will see her back in 6 weeks.    Orders Placed This Encounter   Procedures   • ECG 12 Lead     This order was created via procedure documentation      Agnieszka Rizzo   [place] Medication Instructions SILVIA:    Printed on:02/28/17 0076   Medication Information                      acetylcysteine (MUCOMYST) 20 % nebulizer solution  Take 4 mL by nebulization 4 (Four) Times a Day.             acyclovir (ZOVIRAX) 400 MG tablet  Take 400 mg by mouth 3 (Three) Times a Day. Take no more than 5 doses a day.             ADVAIR -21 MCG/ACT inhaler  Inhale 2 puffs 2 (Two) Times a Day.              atorvastatin (LIPITOR) 40 MG tablet  Take 1 tablet by mouth Daily.             B Complex-C (SUPER B COMPLEX PO)  Take  by mouth.             cholecalciferol (VITAMIN D3) 1000 UNITS tablet  Take 1,000 Units by mouth Daily.             dabigatran etexilate (PRADAXA) 150 MG capsu  Take 1 capsule by mouth Every 12 (Twelve) Hours.             diltiaZEM CD (CARDIZEM CD) 240 MG 24 hr capsule  Take 1 capsule by mouth Daily.             diltiaZEM CD (CARDIZEM CD) 240 MG 24 hr capsule  Take 1 capsule by mouth Daily.             ezetimibe (ZETIA) 10 MG tablet  Take 1 tablet by mouth Daily.             furosemide (LASIX) 20 MG tablet  Take 20 mg by mouth 2 (Two) Times a Day.             Glucosamine-Chondroit-Vit C-Mn (GLUCOSAMINE-CHONDROITIN) tablet  Take 1 tablet by mouth Daily.             ipratropium-albuterol (DUO-NEB) 0.5-2.5 mg/mL nebulizer  Take 3 mL by nebulization Every 4 (Four) Hours As Needed for wheezing or shortness of air for up to 30 days.             loratadine (ALLERGY RELIEF) 10 MG tablet  Take 10 mg by mouth Daily.             losartan (COZAAR) 50 MG tablet  50 mg 2 (Two) Times a Day.             Multiple Vitamin (MULTIVITAMIN) tablet  Take 1 tablet by mouth Daily.             pantoprazole (PROTONIX) 40 MG EC tablet  Take 1 tablet by mouth Daily.             VERAMYST 27.5 MCG/SPRAY nasal spray  1 spray into each nostril Daily.             voriconazole (VFEND) 200 MG tablet  Take 200 mg by mouth 2 (Two) Times a Day.                        Marcie Robbins MD  02/28/17  12:38 PM

## 2017-03-06 ENCOUNTER — OFFICE VISIT (OUTPATIENT)
Dept: ORTHOPEDIC SURGERY | Facility: CLINIC | Age: 82
End: 2017-03-06

## 2017-03-06 VITALS — BODY MASS INDEX: 22.86 KG/M2 | HEIGHT: 63 IN | WEIGHT: 129 LBS | TEMPERATURE: 98.7 F

## 2017-03-06 DIAGNOSIS — S49.92XA INJURY OF LEFT CLAVICLE, INITIAL ENCOUNTER: Primary | ICD-10-CM

## 2017-03-06 PROCEDURE — 23500 CLTX CLAVICULAR FX W/O MNPJ: CPT | Performed by: ORTHOPAEDIC SURGERY

## 2017-03-06 PROCEDURE — 73000 X-RAY EXAM OF COLLAR BONE: CPT | Performed by: ORTHOPAEDIC SURGERY

## 2017-03-06 PROCEDURE — 99204 OFFICE O/P NEW MOD 45 MIN: CPT | Performed by: ORTHOPAEDIC SURGERY

## 2017-03-11 ENCOUNTER — HOSPITAL ENCOUNTER (INPATIENT)
Facility: HOSPITAL | Age: 82
LOS: 9 days | Discharge: HOME | End: 2017-03-20
Attending: EMERGENCY MEDICINE | Admitting: INTERNAL MEDICINE

## 2017-03-11 ENCOUNTER — APPOINTMENT (OUTPATIENT)
Dept: CT IMAGING | Facility: HOSPITAL | Age: 82
End: 2017-03-11

## 2017-03-11 ENCOUNTER — APPOINTMENT (OUTPATIENT)
Dept: GENERAL RADIOLOGY | Facility: HOSPITAL | Age: 82
End: 2017-03-11

## 2017-03-11 DIAGNOSIS — A04.72 C. DIFFICILE COLITIS: Primary | ICD-10-CM

## 2017-03-11 DIAGNOSIS — I48.91 ATRIAL FIBRILLATION WITH RVR (HCC): ICD-10-CM

## 2017-03-11 DIAGNOSIS — R53.1 WEAKNESS GENERALIZED: ICD-10-CM

## 2017-03-11 LAB
ALBUMIN SERPL-MCNC: 3.1 G/DL (ref 3.5–5.2)
ALBUMIN/GLOB SERPL: 1.1 G/DL
ALP SERPL-CCNC: 71 U/L (ref 39–117)
ALT SERPL W P-5'-P-CCNC: 15 U/L (ref 1–33)
ANION GAP SERPL CALCULATED.3IONS-SCNC: 14.4 MMOL/L
AST SERPL-CCNC: 19 U/L (ref 1–32)
BASOPHILS # BLD AUTO: 0.03 10*3/MM3 (ref 0–0.2)
BASOPHILS NFR BLD AUTO: 0.2 % (ref 0–1.5)
BILIRUB SERPL-MCNC: 0.7 MG/DL (ref 0.1–1.2)
BUN BLD-MCNC: 8 MG/DL (ref 8–23)
BUN/CREAT SERPL: 15.1 (ref 7–25)
BURR CELLS BLD QL SMEAR: NORMAL
C DIFF TOX GENS STL QL NAA+PROBE: POSITIVE
CALCIUM SPEC-SCNC: 8.4 MG/DL (ref 8.6–10.5)
CHLORIDE SERPL-SCNC: 91 MMOL/L (ref 98–107)
CO2 SERPL-SCNC: 23.6 MMOL/L (ref 22–29)
CREAT BLD-MCNC: 0.53 MG/DL (ref 0.57–1)
D-LACTATE SERPL-SCNC: 1 MMOL/L (ref 0.5–2)
DEPRECATED RDW RBC AUTO: 53.1 FL (ref 37–54)
EOSINOPHIL # BLD AUTO: 0.07 10*3/MM3 (ref 0–0.7)
EOSINOPHIL NFR BLD AUTO: 0.4 % (ref 0.3–6.2)
ERYTHROCYTE [DISTWIDTH] IN BLOOD BY AUTOMATED COUNT: 15.5 % (ref 11.7–13)
GFR SERPL CREATININE-BSD FRML MDRD: 109 ML/MIN/1.73
GLOBULIN UR ELPH-MCNC: 2.9 GM/DL
GLUCOSE BLD-MCNC: 138 MG/DL (ref 65–99)
HCT VFR BLD AUTO: 30.7 % (ref 35.6–45.5)
HGB BLD-MCNC: 10.3 G/DL (ref 11.9–15.5)
HOLD SPECIMEN: NORMAL
HOLD SPECIMEN: NORMAL
IMM GRANULOCYTES # BLD: 0.07 10*3/MM3 (ref 0–0.03)
IMM GRANULOCYTES NFR BLD: 0.4 % (ref 0–0.5)
LYMPHOCYTES # BLD AUTO: 0.7 10*3/MM3 (ref 0.9–4.8)
LYMPHOCYTES NFR BLD AUTO: 3.8 % (ref 19.6–45.3)
MCH RBC QN AUTO: 31.2 PG (ref 26.9–32)
MCHC RBC AUTO-ENTMCNC: 33.6 G/DL (ref 32.4–36.3)
MCV RBC AUTO: 93 FL (ref 80.5–98.2)
MONOCYTES # BLD AUTO: 1.26 10*3/MM3 (ref 0.2–1.2)
MONOCYTES NFR BLD AUTO: 6.8 % (ref 5–12)
NEUTROPHILS # BLD AUTO: 16.27 10*3/MM3 (ref 1.9–8.1)
NEUTROPHILS NFR BLD AUTO: 88.4 % (ref 42.7–76)
PLAT MORPH BLD: NORMAL
PLATELET # BLD AUTO: 367 10*3/MM3 (ref 140–500)
PMV BLD AUTO: 9.1 FL (ref 6–12)
POIKILOCYTOSIS BLD QL SMEAR: NORMAL
POTASSIUM BLD-SCNC: 3.7 MMOL/L (ref 3.5–5.2)
PROT SERPL-MCNC: 6 G/DL (ref 6–8.5)
RBC # BLD AUTO: 3.3 10*6/MM3 (ref 3.9–5.2)
SODIUM BLD-SCNC: 129 MMOL/L (ref 136–145)
WBC MORPH BLD: NORMAL
WBC NRBC COR # BLD: 18.4 10*3/MM3 (ref 4.5–10.7)
WHOLE BLOOD HOLD SPECIMEN: NORMAL
WHOLE BLOOD HOLD SPECIMEN: NORMAL

## 2017-03-11 PROCEDURE — 71010 HC CHEST PA OR AP: CPT

## 2017-03-11 PROCEDURE — 99285 EMERGENCY DEPT VISIT HI MDM: CPT

## 2017-03-11 PROCEDURE — 25010000002 ONDANSETRON PER 1 MG: Performed by: INTERNAL MEDICINE

## 2017-03-11 PROCEDURE — 85025 COMPLETE CBC W/AUTO DIFF WBC: CPT | Performed by: EMERGENCY MEDICINE

## 2017-03-11 PROCEDURE — 87493 C DIFF AMPLIFIED PROBE: CPT | Performed by: EMERGENCY MEDICINE

## 2017-03-11 PROCEDURE — 87040 BLOOD CULTURE FOR BACTERIA: CPT | Performed by: EMERGENCY MEDICINE

## 2017-03-11 PROCEDURE — 83605 ASSAY OF LACTIC ACID: CPT | Performed by: EMERGENCY MEDICINE

## 2017-03-11 PROCEDURE — 93010 ELECTROCARDIOGRAM REPORT: CPT | Performed by: INTERNAL MEDICINE

## 2017-03-11 PROCEDURE — 74176 CT ABD & PELVIS W/O CONTRAST: CPT

## 2017-03-11 PROCEDURE — 85007 BL SMEAR W/DIFF WBC COUNT: CPT | Performed by: EMERGENCY MEDICINE

## 2017-03-11 PROCEDURE — 93005 ELECTROCARDIOGRAM TRACING: CPT | Performed by: EMERGENCY MEDICINE

## 2017-03-11 PROCEDURE — 80053 COMPREHEN METABOLIC PANEL: CPT | Performed by: EMERGENCY MEDICINE

## 2017-03-11 RX ORDER — SODIUM CHLORIDE 0.9 % (FLUSH) 0.9 %
10 SYRINGE (ML) INJECTION AS NEEDED
Status: DISCONTINUED | OUTPATIENT
Start: 2017-03-11 | End: 2017-03-20 | Stop reason: HOSPADM

## 2017-03-11 RX ORDER — ONDANSETRON 2 MG/ML
4 INJECTION INTRAMUSCULAR; INTRAVENOUS EVERY 6 HOURS PRN
Status: DISCONTINUED | OUTPATIENT
Start: 2017-03-11 | End: 2017-03-20 | Stop reason: HOSPADM

## 2017-03-11 RX ORDER — SODIUM CHLORIDE 9 MG/ML
100 INJECTION, SOLUTION INTRAVENOUS CONTINUOUS
Status: DISCONTINUED | OUTPATIENT
Start: 2017-03-11 | End: 2017-03-12

## 2017-03-11 RX ADMIN — SODIUM CHLORIDE 1000 ML: 9 INJECTION, SOLUTION INTRAVENOUS at 18:30

## 2017-03-11 RX ADMIN — VANCOMYCIN 125 MG: KIT at 20:12

## 2017-03-11 RX ADMIN — ONDANSETRON 4 MG: 2 INJECTION INTRAMUSCULAR; INTRAVENOUS at 23:31

## 2017-03-11 RX ADMIN — SODIUM CHLORIDE 125 ML/HR: 9 INJECTION, SOLUTION INTRAVENOUS at 19:35

## 2017-03-11 NOTE — ED PROVIDER NOTES
" EMERGENCY DEPARTMENT ENCOUNTER    CHIEF COMPLAINT  Chief Complaint: diarrhea  History given by: patient  History limited by: none  Room Number: 625/1  PMD: Gabe Horne MD  Pulmonologist: Dr. Tucker    HPI:  Pt is a 86 y.o. female who presents with a recent hx of c. Diff colitis, complaining of diarrhea for the past 3-4 days, worse in the evening than during the day. She states that every evening she has 10 or more episodes of dark, watery  diarrhea. She has had some nausea onset today, but no vomiting. She also noticed a fever today. She has a hx of c. Diff colitis recently, and was on abx for this until about a week ago. She finished her 14 day round of abx last weekend. She had a stool sample on Wednesday by PCP. She states that she has been taking a strong abx for a lung infection for the past 6 weeks, being treated by Dr. Tucker.     Duration:  3-4 days  Onset: gradual  Timing: 10+ times per evening  Quality: \"dark and watery\"  Intensity/Severity: moderate/severe  Progression: unchanged  Associated Symptoms: nausea, fever, decreased appetite  Aggravating Factors: worse at night  Alleviating Factors: none stated  Previous Episodes: recent c. Diff  Treatment before arrival: none stated    PAST MEDICAL HISTORY  Active Ambulatory Problems     Diagnosis Date Noted   • Pneumothorax of right lung after biopsy 11/12/2016   • Pulmonary aspergillosis 11/16/2016   • Benign essential hypertension 12/01/2016   • Chronic coronary artery disease 12/01/2016   • Heart failure, diastolic, with acute decompensation 12/01/2016   • Hyperlipidemia 12/01/2016   • Mitral valve insufficiency 12/01/2016   • Ventricular premature beats 12/01/2016   • Ventricular tachycardia 12/01/2016   • Permanent atrial fibrillation 12/01/2016   • Pneumonia 01/01/2017   • Pneumonia with the fungal infection aspergillosis 01/06/2017   • Acute respiratory failure with hypoxia 01/09/2017   • Acid-fast bacteria present 01/09/2017   • Hyponatremia " 02/08/2017     Resolved Ambulatory Problems     Diagnosis Date Noted   • No Resolved Ambulatory Problems     Past Medical History   Diagnosis Date   • A-fib    • Abdominal distension    • Acute hypoxemic respiratory failure    • Aspergillosis    • Asthma    • Atrial fibrillation    • Atrial flutter    • Bigeminy    • Bronchiectasis    • CAD (coronary artery disease)    • Chest tightness    • Colitis    • Cough    • Cryoglobulinemia    • Diastolic heart failure    • Dyspnea    • Fall    • History of pneumonia    • Hypertension    • Hypoxia    • Infectious viral hepatitis    • Leg swelling    • Lesion of lung    • MR (mitral regurgitation)    • MVP (mitral valve prolapse)    • PAF (paroxysmal atrial fibrillation)    • SOB (shortness of breath)    • UTI (urinary tract infection)    • Weakness    • Wheeze        PAST SURGICAL HISTORY  Past Surgical History   Procedure Laterality Date   • Hysterectomy     • D&c with suction     • Cataract extraction extracapsular w/ intraocular lens implantation     • Knee arthroscopy Left    • Colonoscopy       2013   • Bronchoscopy N/A 11/12/2016     Procedure: BRONCHOSCOPY WITH FLUORO, BRUSHINGS, BAL, AND BIOPSIES;  Surgeon: Rogelio Tucker MD;  Location: [facility] ENDOSCOPY;  Service:        FAMILY HISTORY  Family History   Problem Relation Age of Onset   • Hypertension Mother    • Hypertension Father    • Stroke Father    • Cancer Son        SOCIAL HISTORY  Social History     Social History   • Marital status:      Spouse name: N/A   • Number of children: N/A   • Years of education: N/A     Occupational History   • Not on file.     Social History Main Topics   • Smoking status: Never Smoker   • Smokeless tobacco: Never Used   • Alcohol use No   • Drug use: No   • Sexual activity: Yes     Partners: Male     Other Topics Concern   • Not on file     Social History Narrative       ALLERGIES  Amlodipine besylate; Aspirin; Bactrim [sulfamethoxazole-trimethoprim]; Erythromycin; Levaquin  [levofloxacin]; Macrobid [nitrofurantoin]; Penicillins; and Ramipril    REVIEW OF SYSTEMS  Review of Systems   Constitutional: Positive for appetite change (decreased) and fever.   HENT: Negative for congestion and sore throat.    Eyes: Negative.    Respiratory: Negative for cough and shortness of breath.    Cardiovascular: Negative for chest pain.   Gastrointestinal: Positive for diarrhea and nausea. Negative for abdominal pain and vomiting.   Genitourinary: Negative for dysuria.   Musculoskeletal: Negative for back pain and neck pain.   Skin: Negative for rash.   Allergic/Immunologic: Negative.    Neurological: Negative for weakness, numbness and headaches.   Hematological: Negative.    Psychiatric/Behavioral: Negative.    All other systems reviewed and are negative.      PHYSICAL EXAM  ED Triage Vitals   Temp Heart Rate Resp BP SpO2   03/11/17 1749 03/11/17 1745 03/11/17 1745 03/11/17 1745 03/11/17 1745   99.8 °F (37.7 °C) 124 22 129/78 100 %      Temp src Heart Rate Source Patient Position BP Location FiO2 (%)   03/11/17 1749 -- -- -- --   Oral           Physical Exam   Constitutional: She is oriented to person, place, and time and well-developed, well-nourished, and in no distress.   HENT:   Head: Normocephalic and atraumatic.   Mouth/Throat: Mucous membranes are dry.   Eyes: EOM are normal. Pupils are equal, round, and reactive to light.   Neck: Normal range of motion. Neck supple.   Cardiovascular: Normal heart sounds.  An irregularly irregular rhythm present. Tachycardia present.    Pulmonary/Chest: Effort normal and breath sounds normal. No respiratory distress.   Abdominal: Soft. There is tenderness (subjective). There is no rebound and no guarding.   Musculoskeletal: Normal range of motion. She exhibits edema (1+ BLE). She exhibits no deformity.   Neurological: She is alert and oriented to person, place, and time. She has normal sensation and normal strength.   Skin: Skin is warm and dry. No rash noted.    Psychiatric: Mood and affect normal.   Nursing note and vitals reviewed.      LAB RESULTS  Lab Results (last 24 hours)     Procedure Component Value Units Date/Time    CBC & Differential [[id number]] Collected:  03/11/17 1808    Specimen:  Blood Updated:  03/11/17 1856    Narrative:       The following orders were created for panel order CBC & Differential.  Procedure                               Abnormality         Status                     ---------                               -----------         ------                     Scan Slide[[id number]]                                        Final result               CBC Auto Differential[[id number]]         Abnormal            Final result                 Please view results for these tests on the individual orders.    Comprehensive Metabolic Panel [[id number]]  (Abnormal) Collected:  03/11/17 1808    Specimen:  Blood Updated:  03/11/17 1850     Glucose 138 (H) mg/dL      BUN 8 mg/dL      Creatinine 0.53 (L) mg/dL      Sodium 129 (L) mmol/L      Potassium 3.7 mmol/L      Chloride 91 (L) mmol/L      CO2 23.6 mmol/L      Calcium 8.4 (L) mg/dL      Total Protein 6.0 g/dL      Albumin 3.10 (L) g/dL      ALT (SGPT) 15 U/L      AST (SGOT) 19 U/L      Alkaline Phosphatase 71 U/L      Total Bilirubin 0.7 mg/dL      eGFR Non African Amer 109 mL/min/1.73      Globulin 2.9 gm/dL      A/G Ratio 1.1 g/dL      BUN/Creatinine Ratio 15.1      Anion Gap 14.4 mmol/L     Narrative:       The MDRD GFR formula is only valid for adults with stable renal function between ages 18 and 70.    Lactic Acid, Plasma [[id number]]  (Normal) Collected:  03/11/17 1808    Specimen:  Blood Updated:  03/11/17 1836     Lactate 1.0 mmol/L     Blood Culture [[id number]] Collected:  03/11/17 1808    Specimen:  Blood from Arm, Left Updated:  03/11/17 1813    CBC Auto Differential [[id number]]  (Abnormal) Collected:  03/11/17 1808    Specimen:  Blood Updated:  03/11/17 1856     WBC 18.40 (H) 10*3/mm3      RBC 3.30 (L)  10*6/mm3      Hemoglobin 10.3 (L) g/dL      Hematocrit 30.7 (L) %      MCV 93.0 fL      MCH 31.2 pg      MCHC 33.6 g/dL      RDW 15.5 (H) %      RDW-SD 53.1 fl      MPV 9.1 fL      Platelets 367 10*3/mm3      Neutrophil % 88.4 (H) %      Lymphocyte % 3.8 (L) %      Monocyte % 6.8 %      Eosinophil % 0.4 %      Basophil % 0.2 %      Immature Grans % 0.4 %      Neutrophils, Absolute 16.27 (H) 10*3/mm3      Lymphocytes, Absolute 0.70 (L) 10*3/mm3      Monocytes, Absolute 1.26 (H) 10*3/mm3      Eosinophils, Absolute 0.07 10*3/mm3      Basophils, Absolute 0.03 10*3/mm3      Immature Grans, Absolute 0.07 (H) 10*3/mm3     Scan Slide [[id number]] Collected:  03/11/17 1808    Specimen:  Blood Updated:  03/11/17 1856     Crenated RBC's Mod/2+      Poikilocytes Mod/2+      WBC Morphology Normal      Platelet Morphology Normal     Blood Culture [[id number]] Collected:  03/11/17 1810    Specimen:  Blood from Arm, Right Updated:  03/11/17 1813    Clostridium Difficile Toxin, PCR [[id number]] Collected:  03/11/17 2013    Specimen:  Stool from Per Rectum Updated:  03/11/17 2015        I ordered the above labs and reviewed the results      RADIOLOGY  CT Abdomen Pelvis Without Contrast   Final Result   1. There is generalized wall thickening and inflammatory change  throughout the colon most consistent with C. difficile colitis and  further clinical correlation is recommended. The small bowel loops are  normal in caliber.     2. There is a tiny right pleural effusion and there is some strandlike  atelectasis at the lung bases. There are several small cysts scattered  in the liver and these are unchanged from 08/21/2009. The spleen,  pancreas, and both adrenal glands are unremarkable.     3. The right kidney appears normal. The left kidney contains a prominent  cyst measuring 7.3 cm which is unchanged. There is no aortic aneurysm or  retroperitoneal lymphadenopathy.     4. In the pelvis, there is a very small amount of free fluid  consistent  with the generalized colitis. The remainder of the CT scan is  Unremarkable.     XR Chest 1 View   Final Result   There is prominent cardiomegaly but no evidence of overt CHF and this is  unchanged from 02/08/2017. There is now some localized haziness at the  right base medially associated with a tiny right pleural effusion and  suspicious for an area of developing pneumonia.        I ordered the above noted radiological studies. Interpreted by radiologist. Discussed with radiologist. Reviewed by me in PACS.       PROCEDURES  Procedures    EKG         EKG time: 6:16 PM  Rhythm/Rate: A-fib, rate 133  P waves and KS: absent  QRS, axis: narrow complex   ST and T waves: diffuse non-specific changes   Interpreted Contemporaneously by me, independently viewed  Aside from faster rate, similar to previous on 2/8/17    PROGRESS AND CONSULTS  ED Course     5:59 PM: Will order labs, CT abd/pelvis for further evaluation. Suspect recurrent c. Diff. IVF ordered for hydration.     7:22 PM: Pt rechecked, HR in the 120s, O2 sats 99% on room air, /89. Discussed likely recurrence of c. Diff, plan for admission. Pt understands and agrees with the plan. All questions answered.    7:33 PM: Discussed the case with Dr. Hubbard who agrees to admit. PO Vancomycin ordered for abx coverage.      MEDICAL DECISION MAKING  Results were reviewed/discussed with the patient and they were also made aware of online access. Pt also made aware that some labs, such as cultures, will not be resulted during ER visit and follow up with PMD is necessary.     MDM  Number of Diagnoses or Management Options  Atrial fibrillation with RVR:   C. difficile colitis:      Amount and/or Complexity of Data Reviewed  Clinical lab tests: ordered and reviewed (Creatinine 0.53, BUN 8, Creatinine 0.53, Sodium 129, Potassium 3.7 Lactate 1.0, WBC 18.40, Hgb 10.3)  Tests in the radiology section of CPT®: ordered and reviewed (CXR shows prominent cardiomegaly,  no overt CHF. Haziness at the right base with tiny right pleural effusion, suspicious for an area of developing pneumonia. CT abd/pelvis indicative of colitis)  Tests in the medicine section of CPT®: ordered and reviewed  Discussion of test results with the performing providers: yes  Decide to obtain previous medical records or to obtain history from someone other than the patient: yes  Review and summarize past medical records: yes (Pt seen at urgent care today with a fever, she was found to be in a-fib and sent here for evaluation. Note from Dr. Squires shows she was discharged from last admission on 2/12/17 for hyponatremia. She had Rocephin and Bactrim for a UTI, hx includes pulmonary aspergillosis.)  Discuss the patient with other providers: yes (Dr. Hubbard)  Independent visualization of images, tracings, or specimens: yes    Patient Progress  Patient progress: stable       DIAGNOSIS  Final diagnoses:   Atrial fibrillation with RVR   C. difficile colitis       DISPOSITION  ADMISSION: Patient admitted by Dr. Hubbard to telemetry.    Discussed treatment plan and reason for admission with pt/family and admitting physician.  Pt/family voiced understanding of the plan for admission for further testing/treatment as needed.     Latest Documented Vital Signs:  As of 10:05 PM  BP- 136/77 HR- (!) 124 Temp- 99.5 °F (37.5 °C) (Oral) O2 sat- 98%    --  Documentation assistance provided by danii Gr for Dr. Chavarria.  Information recorded by the scribe was done at my direction and has been verified and validated by me.     Alex Gr  03/11/17 4052       Gerhard Chavarria MD  03/11/17 4063

## 2017-03-11 NOTE — ED NOTES
Patient to er from home per ems with c/o nausea/dirrhea and fever for the last week.      Manan Briseno RN  03/11/17 0463

## 2017-03-12 PROBLEM — A41.9 SEPSIS: Status: ACTIVE | Noted: 2017-03-12

## 2017-03-12 PROBLEM — I50.32 CHRONIC DIASTOLIC CHF (CONGESTIVE HEART FAILURE) (HCC): Status: ACTIVE | Noted: 2017-03-12

## 2017-03-12 PROBLEM — Z66 DNR (DO NOT RESUSCITATE): Status: ACTIVE | Noted: 2017-03-12

## 2017-03-12 LAB
ANION GAP SERPL CALCULATED.3IONS-SCNC: 13.9 MMOL/L
BUN BLD-MCNC: 10 MG/DL (ref 8–23)
BUN/CREAT SERPL: 19.6 (ref 7–25)
CALCIUM SPEC-SCNC: 8.4 MG/DL (ref 8.6–10.5)
CHLORIDE SERPL-SCNC: 92 MMOL/L (ref 98–107)
CO2 SERPL-SCNC: 25.1 MMOL/L (ref 22–29)
CREAT BLD-MCNC: 0.51 MG/DL (ref 0.57–1)
DEPRECATED RDW RBC AUTO: 53.4 FL (ref 37–54)
ERYTHROCYTE [DISTWIDTH] IN BLOOD BY AUTOMATED COUNT: 15.6 % (ref 11.7–13)
GFR SERPL CREATININE-BSD FRML MDRD: 114 ML/MIN/1.73
GLUCOSE BLD-MCNC: 105 MG/DL (ref 65–99)
HCT VFR BLD AUTO: 31.2 % (ref 35.6–45.5)
HGB BLD-MCNC: 10.2 G/DL (ref 11.9–15.5)
MCH RBC QN AUTO: 30.8 PG (ref 26.9–32)
MCHC RBC AUTO-ENTMCNC: 32.7 G/DL (ref 32.4–36.3)
MCV RBC AUTO: 94.3 FL (ref 80.5–98.2)
PLATELET # BLD AUTO: 361 10*3/MM3 (ref 140–500)
PMV BLD AUTO: 10 FL (ref 6–12)
POTASSIUM BLD-SCNC: 3.3 MMOL/L (ref 3.5–5.2)
RBC # BLD AUTO: 3.31 10*6/MM3 (ref 3.9–5.2)
SODIUM BLD-SCNC: 131 MMOL/L (ref 136–145)
WBC NRBC COR # BLD: 25.83 10*3/MM3 (ref 4.5–10.7)

## 2017-03-12 PROCEDURE — 99223 1ST HOSP IP/OBS HIGH 75: CPT | Performed by: INTERNAL MEDICINE

## 2017-03-12 PROCEDURE — 94640 AIRWAY INHALATION TREATMENT: CPT

## 2017-03-12 PROCEDURE — 99222 1ST HOSP IP/OBS MODERATE 55: CPT | Performed by: INTERNAL MEDICINE

## 2017-03-12 PROCEDURE — 97161 PT EVAL LOW COMPLEX 20 MIN: CPT

## 2017-03-12 PROCEDURE — 94799 UNLISTED PULMONARY SVC/PX: CPT

## 2017-03-12 PROCEDURE — 25010000002 ONDANSETRON PER 1 MG: Performed by: INTERNAL MEDICINE

## 2017-03-12 PROCEDURE — 97110 THERAPEUTIC EXERCISES: CPT

## 2017-03-12 PROCEDURE — 80048 BASIC METABOLIC PNL TOTAL CA: CPT | Performed by: INTERNAL MEDICINE

## 2017-03-12 PROCEDURE — 25810000003 SODIUM CHLORIDE 0.9 % WITH KCL 20 MEQ 20-0.9 MEQ/L-% SOLUTION: Performed by: HOSPITALIST

## 2017-03-12 PROCEDURE — 85027 COMPLETE CBC AUTOMATED: CPT | Performed by: INTERNAL MEDICINE

## 2017-03-12 RX ORDER — DILTIAZEM HYDROCHLORIDE 5 MG/ML
5 INJECTION INTRAVENOUS ONCE
Status: COMPLETED | OUTPATIENT
Start: 2017-03-12 | End: 2017-03-12

## 2017-03-12 RX ORDER — ONDANSETRON 4 MG/1
4 TABLET, ORALLY DISINTEGRATING ORAL EVERY 6 HOURS PRN
Status: DISCONTINUED | OUTPATIENT
Start: 2017-03-12 | End: 2017-03-20 | Stop reason: HOSPADM

## 2017-03-12 RX ORDER — DABIGATRAN ETEXILATE 150 MG/1
150 CAPSULE ORAL EVERY 12 HOURS SCHEDULED
Status: DISCONTINUED | OUTPATIENT
Start: 2017-03-12 | End: 2017-03-20 | Stop reason: HOSPADM

## 2017-03-12 RX ORDER — DILTIAZEM HYDROCHLORIDE 5 MG/ML
15 INJECTION INTRAVENOUS ONCE
Status: COMPLETED | OUTPATIENT
Start: 2017-03-12 | End: 2017-03-12

## 2017-03-12 RX ORDER — SODIUM CHLORIDE AND POTASSIUM CHLORIDE 150; 900 MG/100ML; MG/100ML
75 INJECTION, SOLUTION INTRAVENOUS CONTINUOUS
Status: DISCONTINUED | OUTPATIENT
Start: 2017-03-12 | End: 2017-03-15

## 2017-03-12 RX ORDER — SODIUM CHLORIDE 0.9 % (FLUSH) 0.9 %
1-10 SYRINGE (ML) INJECTION AS NEEDED
Status: DISCONTINUED | OUTPATIENT
Start: 2017-03-12 | End: 2017-03-20 | Stop reason: HOSPADM

## 2017-03-12 RX ORDER — ATORVASTATIN CALCIUM 40 MG/1
40 TABLET, FILM COATED ORAL DAILY
Status: DISCONTINUED | OUTPATIENT
Start: 2017-03-12 | End: 2017-03-20 | Stop reason: HOSPADM

## 2017-03-12 RX ORDER — DILTIAZEM HYDROCHLORIDE 240 MG/1
240 CAPSULE, COATED, EXTENDED RELEASE ORAL DAILY
Status: DISCONTINUED | OUTPATIENT
Start: 2017-03-12 | End: 2017-03-12

## 2017-03-12 RX ORDER — PANTOPRAZOLE SODIUM 40 MG/1
40 TABLET, DELAYED RELEASE ORAL
Status: DISCONTINUED | OUTPATIENT
Start: 2017-03-12 | End: 2017-03-20 | Stop reason: HOSPADM

## 2017-03-12 RX ORDER — SODIUM CHLORIDE 9 MG/ML
100 INJECTION, SOLUTION INTRAVENOUS CONTINUOUS
Status: DISCONTINUED | OUTPATIENT
Start: 2017-03-12 | End: 2017-03-12

## 2017-03-12 RX ORDER — ONDANSETRON 2 MG/ML
4 INJECTION INTRAMUSCULAR; INTRAVENOUS EVERY 6 HOURS PRN
Status: DISCONTINUED | OUTPATIENT
Start: 2017-03-12 | End: 2017-03-20 | Stop reason: HOSPADM

## 2017-03-12 RX ORDER — POTASSIUM CHLORIDE 7.45 MG/ML
10 INJECTION INTRAVENOUS
Status: DISCONTINUED | OUTPATIENT
Start: 2017-03-12 | End: 2017-03-20 | Stop reason: HOSPADM

## 2017-03-12 RX ORDER — ACETYLCYSTEINE 200 MG/ML
4 SOLUTION ORAL; RESPIRATORY (INHALATION)
Status: DISCONTINUED | OUTPATIENT
Start: 2017-03-12 | End: 2017-03-12

## 2017-03-12 RX ORDER — BUDESONIDE AND FORMOTEROL FUMARATE DIHYDRATE 160; 4.5 UG/1; UG/1
2 AEROSOL RESPIRATORY (INHALATION)
Status: DISCONTINUED | OUTPATIENT
Start: 2017-03-12 | End: 2017-03-20 | Stop reason: HOSPADM

## 2017-03-12 RX ORDER — LOSARTAN POTASSIUM 50 MG/1
50 TABLET ORAL EVERY 12 HOURS SCHEDULED
Status: DISCONTINUED | OUTPATIENT
Start: 2017-03-12 | End: 2017-03-20 | Stop reason: HOSPADM

## 2017-03-12 RX ORDER — ONDANSETRON 4 MG/1
4 TABLET, FILM COATED ORAL EVERY 6 HOURS PRN
Status: DISCONTINUED | OUTPATIENT
Start: 2017-03-12 | End: 2017-03-20 | Stop reason: HOSPADM

## 2017-03-12 RX ORDER — ACETAMINOPHEN 325 MG/1
650 TABLET ORAL EVERY 4 HOURS PRN
Status: DISCONTINUED | OUTPATIENT
Start: 2017-03-12 | End: 2017-03-20 | Stop reason: HOSPADM

## 2017-03-12 RX ORDER — MELATONIN
1000 DAILY
Status: DISCONTINUED | OUTPATIENT
Start: 2017-03-12 | End: 2017-03-20 | Stop reason: HOSPADM

## 2017-03-12 RX ORDER — IPRATROPIUM BROMIDE AND ALBUTEROL SULFATE 2.5; .5 MG/3ML; MG/3ML
3 SOLUTION RESPIRATORY (INHALATION) EVERY 4 HOURS PRN
Status: DISCONTINUED | OUTPATIENT
Start: 2017-03-12 | End: 2017-03-12

## 2017-03-12 RX ORDER — IPRATROPIUM BROMIDE AND ALBUTEROL SULFATE 2.5; .5 MG/3ML; MG/3ML
3 SOLUTION RESPIRATORY (INHALATION) EVERY 4 HOURS PRN
Status: DISPENSED | OUTPATIENT
Start: 2017-03-12 | End: 2017-03-13

## 2017-03-12 RX ORDER — POTASSIUM CHLORIDE 1.5 G/1.77G
40 POWDER, FOR SOLUTION ORAL AS NEEDED
Status: DISCONTINUED | OUTPATIENT
Start: 2017-03-12 | End: 2017-03-12 | Stop reason: CLARIF

## 2017-03-12 RX ORDER — POTASSIUM CHLORIDE 750 MG/1
40 CAPSULE, EXTENDED RELEASE ORAL AS NEEDED
Status: DISCONTINUED | OUTPATIENT
Start: 2017-03-12 | End: 2017-03-20 | Stop reason: HOSPADM

## 2017-03-12 RX ADMIN — VANCOMYCIN 500 MG: KIT at 12:32

## 2017-03-12 RX ADMIN — SODIUM CHLORIDE 100 ML/HR: 9 INJECTION, SOLUTION INTRAVENOUS at 07:58

## 2017-03-12 RX ADMIN — VANCOMYCIN 125 MG: KIT at 07:58

## 2017-03-12 RX ADMIN — DABIGATRAN ETEXILATE MESYLATE 150 MG: 150 CAPSULE ORAL at 21:11

## 2017-03-12 RX ADMIN — DILTIAZEM HYDROCHLORIDE 15 MG/HR: 100 INJECTION, POWDER, LYOPHILIZED, FOR SOLUTION INTRAVENOUS at 23:48

## 2017-03-12 RX ADMIN — METRONIDAZOLE 500 MG: 500 INJECTION, SOLUTION INTRAVENOUS at 17:40

## 2017-03-12 RX ADMIN — ONDANSETRON 4 MG: 2 INJECTION INTRAMUSCULAR; INTRAVENOUS at 09:28

## 2017-03-12 RX ADMIN — METRONIDAZOLE 500 MG: 500 INJECTION, SOLUTION INTRAVENOUS at 11:17

## 2017-03-12 RX ADMIN — DILTIAZEM HYDROCHLORIDE 15 MG/HR: 100 INJECTION, POWDER, LYOPHILIZED, FOR SOLUTION INTRAVENOUS at 17:06

## 2017-03-12 RX ADMIN — POTASSIUM CHLORIDE AND SODIUM CHLORIDE 100 ML/HR: 900; 150 INJECTION, SOLUTION INTRAVENOUS at 12:48

## 2017-03-12 RX ADMIN — DILTIAZEM HYDROCHLORIDE 5 MG: 5 INJECTION INTRAVENOUS at 01:30

## 2017-03-12 RX ADMIN — DABIGATRAN ETEXILATE MESYLATE 150 MG: 150 CAPSULE ORAL at 01:20

## 2017-03-12 RX ADMIN — BUDESONIDE AND FORMOTEROL FUMARATE DIHYDRATE 2 PUFF: 160; 4.5 AEROSOL RESPIRATORY (INHALATION) at 19:59

## 2017-03-12 RX ADMIN — VANCOMYCIN 500 MG: KIT at 17:43

## 2017-03-12 RX ADMIN — SODIUM CHLORIDE 100 ML/HR: 9 INJECTION, SOLUTION INTRAVENOUS at 08:09

## 2017-03-12 RX ADMIN — LOSARTAN POTASSIUM 50 MG: 50 TABLET, FILM COATED ORAL at 21:11

## 2017-03-12 RX ADMIN — DILTIAZEM HYDROCHLORIDE 30 MG: 30 TABLET, FILM COATED ORAL at 01:20

## 2017-03-12 RX ADMIN — PANTOPRAZOLE SODIUM 40 MG: 40 TABLET, DELAYED RELEASE ORAL at 07:58

## 2017-03-12 RX ADMIN — DABIGATRAN ETEXILATE MESYLATE 150 MG: 150 CAPSULE ORAL at 09:35

## 2017-03-12 RX ADMIN — DILTIAZEM HYDROCHLORIDE 5 MG/HR: 100 INJECTION, POWDER, LYOPHILIZED, FOR SOLUTION INTRAVENOUS at 08:27

## 2017-03-12 RX ADMIN — DILTIAZEM HYDROCHLORIDE 15 MG/HR: 100 INJECTION, POWDER, LYOPHILIZED, FOR SOLUTION INTRAVENOUS at 11:14

## 2017-03-12 RX ADMIN — VANCOMYCIN 500 MG: KIT at 23:52

## 2017-03-12 RX ADMIN — DILTIAZEM HYDROCHLORIDE 15 MG: 5 INJECTION, SOLUTION INTRAVENOUS at 08:28

## 2017-03-12 RX ADMIN — ATORVASTATIN CALCIUM 40 MG: 40 TABLET, FILM COATED ORAL at 09:35

## 2017-03-12 RX ADMIN — VITAMIN D, TAB 1000IU (100/BT) 1000 UNITS: 25 TAB at 09:35

## 2017-03-12 NOTE — PLAN OF CARE
Problem: Inpatient Physical Therapy  Goal: Bed Mobility Goal LTG- PT  Outcome: Ongoing (interventions implemented as appropriate)    03/12/17 1615   Bed Mobility PT LTG   Bed Mobility PT LTG, Date Established 03/12/17   Bed Mobility PT LTG, Time to Achieve 1 wk   Bed Mobility PT LTG, Activity Type all bed mobility   Bed Mobility PT LTG, [place] Level independent       Goal: Transfer Training Goal 1 LTG- PT  Outcome: Ongoing (interventions implemented as appropriate)    03/12/17 1615   Transfer Training PT LTG   Transfer Training PT LTG, Date Established 03/12/17   Transfer Training PT LTG, Time to Achieve 1 wk   Transfer Training PT LTG, Activity Type sit to stand/stand to sit   Transfer Training PT LTG, [place] Level conditional independence   Transfer Training PT LTG, Assist Device walker, rolling       Goal: Gait Training Goal LTG- PT  Outcome: Ongoing (interventions implemented as appropriate)    03/12/17 1615   Gait Training PT LTG   Gait Training Goal PT LTG, Date Established 03/12/17   Gait Training Goal PT LTG, Time to Achieve 1 wk   Gait Training Goal PT LTG, [place] Level supervision required   Gait Training Goal PT LTG, Assist Device walker, rolling   Gait Training Goal PT LTG, Distance to Achieve 120

## 2017-03-12 NOTE — PROGRESS NOTES
Acute Care - Physical Therapy Initial Evaluation  [facility]     Patient Name: Agnieszka Rizzo  : 1930  MRN: [id number]  Today's Date: 3/12/2017      Date of Referral to PT: 17         Admit Date: 3/11/2017     Visit Dx:    ICD-10-CM ICD-9-CM   1. C. difficile colitis A04.7 008.45   2. Atrial fibrillation with RVR I48.91 427.31   3. Weakness generalized R53.1 780.79     Patient Active Problem List   Diagnosis   • Pneumothorax of right lung after biopsy   • Pulmonary aspergillosis   • Benign essential hypertension   • Chronic coronary artery disease   • Hyperlipidemia   • Mitral valve insufficiency   • Ventricular premature beats   • Ventricular tachycardia   • Chronic atrial fibrillation   • Pneumonia   • Pneumonia with the fungal infection aspergillosis   • Acute respiratory failure with hypoxia   • Acid-fast bacteria present   • Hyponatremia   • C. difficile diarrhea   • DNR (do not resuscitate)   • Sepsis   • Chronic diastolic CHF (congestive heart failure)     Past Medical History   Diagnosis Date   • Asthma    • Atrial fibrillation    • Atrial flutter    • Bronchiectasis    • C. difficile diarrhea 3/11/2017   • CAD (coronary artery disease)      nonobstructive   • Colitis    • Cough    • Cryoglobulinemia    • Diastolic heart failure    • Fall    • Hyperlipidemia    • Hypertension    • Hyponatremia    • Hypoxia    • Infectious viral hepatitis      AGE 13   • Leg swelling    • Lesion of lung    • MR (mitral regurgitation)      mild   • MVP (mitral valve prolapse)    • Permanent atrial fibrillation    • Pneumonia with the fungal infection aspergillosis 2017   • SOB (shortness of breath)    • UTI (urinary tract infection)    • Wheeze      mild     Past Surgical History   Procedure Laterality Date   • Hysterectomy     • D&c with suction     • Cataract extraction extracapsular w/ intraocular lens implantation     • Knee arthroscopy Left    • Colonoscopy          • Bronchoscopy N/A 2016      Procedure: BRONCHOSCOPY WITH FLUORO, BRUSHINGS, BAL, AND BIOPSIES;  Surgeon: Rogelio Tucker MD;  Location: [facility] ENDOSCOPY;  Service:           PT ASSESSMENT (last 72 hours)      PT Evaluation       03/12/17 1523 03/12/17 0819    Rehab Evaluation    Document Type evaluation  -AL     Subjective Information agree to therapy;complains of;nausea/vomiting  -AL     Patient Effort, Rehab Treatment good  -AL     Symptoms Noted During/After Treatment --   nausea  -AL     General Information    Prior Level of Function independent:;community mobility;gait;transfer;bed mobility;ADL's  -AL     Plans/Goals Discussed With patient  -AL     Risks Reviewed patient:  -AL     Benefits Reviewed patient:  -AL     Barriers to Rehab none identified  -AL     Living Environment    Lives With alone  -AL alone  -TB    Living Arrangements --   patio home  -AL house  -TB    Home Accessibility no concerns  -AL no concerns  -TB    Stair Railings at Home  inside, present at both sides  -TB    Type of Financial/Environmental Concern  none  -TB    Transportation Available  car  -TB    Clinical Impression    Date of Referral to PT 03/12/17  -AL     PT Diagnosis generalized weakness  -AL     Prognosis good  -AL     Rehab Potential good, to achieve stated therapy goals  -AL     Pain Assessment    Pain Assessment No/denies pain  -AL     Vision Assessment/Intervention    Visual Impairment WFL  -AL     Cognitive Assessment/Intervention    Current Cognitive/Communication Assessment functional  -AL     Orientation Status oriented x 4  -AL     Follows Commands/Answers Questions 100% of the time  -AL     Personal Safety WNL/WFL  -AL     Short/Long Term Memory intact short term memory;intact long term memory  -AL     ROM (Range of Motion)    General ROM no range of motion deficits identified  -AL     MMT (Manual Muscle Testing)    General MMT Assessment --   generalized weakness  -AL     Bed Mobility, Assessment/Treatment    Bed Mob, Supine to Sit, [place]  supervision required  -AL     Bed Mob, Sit to Supine, [place] supervision required  -AL     Transfer Assessment/Treatment    Transfers, Sit-Stand [place] supervision required;verbal cues required  -AL     Transfers, Stand-Sit [place] supervision required;verbal cues required  -AL     Transfers, Sit-Stand-Sit, Assist Device rolling walker  -AL     Toilet Transfer, [place] supervision required;verbal cues required  -AL     Toilet Transfer, Assistive Device rolling walker  -AL     Transfer, Comment --   Transferred from bed to SBC; changed brief and gown.  -AL     Gait Assessment/Treatment    Gait, [place] Level stand by assist;verbal cues required;nonverbal cues required (demo/gesture)  -AL     Gait, Assistive Device rolling walker  -AL     Gait, Distance (Feet) 40  -AL     Gait, Maintain Weight Bearing Status able to maintain weight bearing status  -AL     Gait, Comment --   Patient became nauseated.  -AL     Positioning and Restraints    Pre-Treatment Position in bed  -AL     Post Treatment Position bed  -AL     In Bed supine;call light within reach;exit alarm on  -AL       03/12/17 0507 03/11/17 1087    General Information    Equipment Currently Used at Home oxygen  -TB commode  -TB    Living Environment    Transportation Available family or friend will provide;car  -TB       User Key  (r) = Recorded By, (t) = Taken By, (c) = Cosigned By    Initials Name Provider Type    TB Giulia Bryant, RN Registered Nurse    ANDERSON Khan, PT Physical Therapist          Physical Therapy Education     Title: PT OT SLP Therapies (Active)     Topic: Physical Therapy (Active)     Point: Mobility training (Done)    Learning Progress Summary    Learner Readiness Method Response Comment Documented by Status   Patient Acceptance E VU  AL 03/12/17 1613 Done               Point: Body mechanics (Done)    Learning Progress Summary    Learner Readiness Method Response Comment Documented by Status   Patient  Acceptance E VU  AL 03/12/17 1613 Done               Point: Precautions (Done)    Learning Progress Summary    Learner Readiness Method Response Comment Documented by Status   Patient Acceptance E VU  AL 03/12/17 1613 Done                      User Key     Initials Effective Dates Name Provider Type [facility] 12/01/15 -  Suintha Khan, PT Physical Therapist PT                PT Recommendation and Plan  Anticipated Equipment Needs At Discharge: front wheeled walker  Anticipated Discharge Disposition: home with home health, inpatient rehabilitation facility  Demonstrates Need for Referral to Another Service:  (has been to [facility]; but really wants to go home with OPPT.)  PT Frequency: daily  Plan of Care Review  Plan Of Care Reviewed With: patient  Outcome Summary/Follow up Plan: Patient is appropriate for skilled PT secondary to decreased strength and aerobic endurance. Did not ambulate as far secondary to nausea starting. Will continue to see patient while in this facility.          IP PT Goals       03/12/17 1615          Bed Mobility PT LTG    Bed Mobility PT LTG, Date Established 03/12/17  -AL      Bed Mobility PT LTG, Time to Achieve 1 wk  -AL      Bed Mobility PT LTG, Activity Type all bed mobility  -AL      Bed Mobility PT LTG, [place] Level independent  -AL      Transfer Training PT LTG    Transfer Training PT LTG, Date Established 03/12/17  -AL      Transfer Training PT LTG, Time to Achieve 1 wk  -AL      Transfer Training PT LTG, Activity Type sit to stand/stand to sit  -AL      Transfer Training PT LTG, [place] Level conditional independence  -AL      Transfer Training PT LTG, Assist Device walker, rolling  -AL      Gait Training PT LTG    Gait Training Goal PT LTG, Date Established 03/12/17  -AL      Gait Training Goal PT LTG, Time to Achieve 1 wk  -AL      Gait Training Goal PT LTG, [place] Level supervision required  -AL      Gait Training Goal PT LTG, Assist Device walker, rolling   -AL      Gait Training Goal PT LTG, Distance to Achieve 120  -AL        User Key  (r) = Recorded By, (t) = Taken By, (c) = Cosigned By    Initials Name Provider Type    ANDERSON Khan, PT Physical Therapist                Outcome Measures       03/12/17 1600          How much help from another person do you currently need...    Turning from your back to your side while in flat bed without using bedrails? 3  -AL      Moving from lying on back to sitting on the side of a flat bed without bedrails? 3  -AL      Moving to and from a bed to a chair (including a wheelchair)? 3  -AL      Standing up from a chair using your arms (e.g., wheelchair, bedside chair)? 3  -AL      Climbing 3-5 steps with a railing? 2  -AL      To walk in hospital room? 3  -AL      AM-PAC 6 Clicks Score 17  -AL      Functional Assessment    Outcome Measure Options AM-PAC 6 Clicks Basic Mobility (PT)  -AL        User Key  (r) = Recorded By, (t) = Taken By, (c) = Cosigned By    Initials Name Provider Type    ANDERSON Khan, PT Physical Therapist           Time Calculation:         PT Charges       03/12/17 1617          Time Calculation    Start Time 1523  -AL      Stop Time 1552  -AL      Time Calculation (min) 29 min  -AL      PT Received On 03/12/17  -AL      PT - Next Appointment 03/13/17  -AL      PT Goal Re-Cert Due Date 03/19/17  -AL        User Key  (r) = Recorded By, (t) = Taken By, (c) = Cosigned By    Initials Name Provider Type    ANDERSON Khan, PT Physical Therapist          Therapy Charges for Today     Code Description Service Date Service Provider Modifiers Qty    65578749124 HC PT EVAL LOW COMPLEXITY 2 3/12/2017 Sunitha Khan, PT GP 1    39377275900 HC PT THER PROC EA 15 MIN 3/12/2017 Sunitha Khan, PT GP 1          PT G-Codes  Outcome Measure Options: AM-PAC 6 Clicks Basic Mobility (PT)      Sunitha Khan, PT  3/12/2017

## 2017-03-12 NOTE — PLAN OF CARE
Problem: Patient Care Overview (Adult)  Goal: Plan of Care Review  Outcome: Ongoing (interventions implemented as appropriate)    03/12/17 0345   Coping/Psychosocial Response Interventions   Plan Of Care Reviewed With patient   Outcome Evaluation   Outcome Summary/Follow up Plan Patient is appropriate for skilled PT secondary to decreased strength and aerobic endurance. Did not ambulate as far secondary to nausea starting. Will continue to see patient while in this facility.

## 2017-03-12 NOTE — PROGRESS NOTES
"DAILY PROGRESS NOTE  [facility]    Patient Identification:  Name: Agnieszka Rizzo  Age: 86 y.o.  Sex: female  :  1930  MRN: [id number]         Primary Care Physician: Gabe Horne MD    Subjective:  Interval History:She is still having severe diarrhea.    Objective:    Scheduled Meds:  acetylcysteine 4 mL Nebulization 4x Daily - RT   atorvastatin 40 mg Oral Daily   budesonide-formoterol 2 puff Inhalation BID - RT   cholecalciferol 1,000 Units Oral Daily   dabigatran etexilate 150 mg Oral Q12H   losartan 50 mg Oral Q12H   metroNIDAZOLE 500 mg Intravenous Q8H   pantoprazole 40 mg Oral Q AM   vancomycin 500 mg Oral Q6H     Continuous Infusions:  diltiaZEM 5-15 mg/hr Last Rate: 15 mg/hr (17 1114)   sodium chloride 0.9 % with KCl 20 mEq 100 mL/hr Last Rate: 100 mL/hr (17 1248)       Vital signs in last 24 hours:  Temp:  [98 °F (36.7 °C)-99.8 °F (37.7 °C)] 98 °F (36.7 °C)  Heart Rate:  [106-150] 110  Resp:  [14-22] 14  BP: ()/(48-89) 104/48    Intake/Output:    Intake/Output Summary (Last 24 hours) at 17 1448  Last data filed at 17 1228   Gross per 24 hour   Intake   2100 ml   Output    750 ml   Net   1350 ml       Exam:  Visit Vitals   • /48 (BP Location: Right arm, Patient Position: Lying)   • Pulse 110   • Temp 98 °F (36.7 °C) (Oral)   • Resp 14   • Ht 65\" (165.1 cm)   • Wt 127 lb 12.8 oz (58 kg)   • SpO2 90%   • BMI 21.27 kg/m2       General Appearance:    Alert, cooperative, no distress   Head:    Normocephalic, without obvious abnormality, atraumatic   Eyes:       Throat:   Lips, tongue, gums normal   Neck:   Supple, symmetrical, trachea midline, no JVD   Lungs:     Clear to auscultation bilaterally, respirations unlabored   Chest Wall:    No tenderness or deformity    Heart:    Regular rate and rhythm, S1 and S2 normal, no murmur,no  Rub or gallop   Abdomen:     Soft, non-tender, bowel sounds active, no masses, no organomegaly    Extremities:   " Extremities normal, atraumatic, no cyanosis or edema   Pulses:      Skin:   Skin is warm and dry,  no rashes or palpable lesions   Neurologic:   no focal deficits noted      [unfilled]  Data Review:    Results from last 7 days  Lab Units 03/12/17  0642 03/11/17  1808   SODIUM mmol/L 131* 129*   POTASSIUM mmol/L 3.3* 3.7   CHLORIDE mmol/L 92* 91*   TOTAL CO2 mmol/L 25.1 23.6   BUN mg/dL 10 8   CREATININE mg/dL 0.51* 0.53*   GLUCOSE mg/dL 105* 138*   CALCIUM mg/dL 8.4* 8.4*       Results from last 7 days  Lab Units 03/12/17  0642 03/11/17  1808   WBC 10*3/mm3 25.83* 18.40*   HEMOGLOBIN g/dL 10.2* 10.3*   HEMATOCRIT % 31.2* 30.7*   PLATELETS 10*3/mm3 361 367               0  Lab Value Date/Time   TROPONINT <0.010 02/08/2017 0636   TROPONINT <0.010 01/06/2017 1857   TROPONINT <0.010 01/01/2017 1135   TROPONINT <0.010 11/19/2016 2102   TROPONINT <0.010 11/14/2016 1537   TROPONINT <0.010 11/12/2016 2117   TROPONINT <0.01 01/22/2016 1450           Results from last 7 days  Lab Units 03/11/17  1808   ALK PHOS U/L 71   BILIRUBIN mg/dL 0.7   ALT (SGPT) U/L 15   AST (SGOT) U/L 19             No results found for: POCGLU        Patient Active Problem List   Diagnosis Code   • Pneumothorax of right lung after biopsy J95.811   • Pulmonary aspergillosis B44.1   • Benign essential hypertension I10   • Chronic coronary artery disease I25.10   • Hyperlipidemia E78.5   • Mitral valve insufficiency I34.0   • Ventricular premature beats I49.3   • Ventricular tachycardia I47.2   • Chronic atrial fibrillation I48.2   • Pneumonia J18.9   • Pneumonia with the fungal infection aspergillosis B44.9   • Acute respiratory failure with hypoxia J96.01   • Acid-fast bacteria present R89.9   • Hyponatremia E87.1   • C. difficile diarrhea A04.7   • DNR (do not resuscitate) Z66   • Sepsis A41.9   • Chronic diastolic CHF (congestive heart failure) I50.32       Assessment:  Principal Problem:    C. difficile diarrhea  Active Problems:    Benign  essential hypertension    Chronic coronary artery disease    Chronic atrial fibrillation    Hyponatremia    DNR (do not resuscitate)    Sepsis    Chronic diastolic CHF (congestive heart failure)      Plan:  Continue with antibiotics for C Dif. IV fluid hydration.    Bronson Lopez MD  3/12/2017  2:48 PM

## 2017-03-12 NOTE — PLAN OF CARE
Problem: Patient Care Overview (Adult)  Goal: Plan of Care Review  Outcome: Ongoing (interventions implemented as appropriate)    03/12/17 1546   Coping/Psychosocial Response Interventions   Plan Of Care Reviewed With patient   Patient Care Overview   Progress improving   Outcome Evaluation   Outcome Summary/Follow up Plan HX OF AFIB-HR'S 150'S-140'S, CARDIZEM DRIP STARTED AND TRITRATED TO 15 MG, TOLERATED WELL. BOLUSED X 3 AND SUSTAINING HR'S 'S-ONE TEENS. B/P STABEL THROUGHOUT CHANGES. FREQUENT STOOLING, IV FLAGYL AND ORAL VANC, CARD GTT, AND IVFS CONTINUES. OCC. NAUSEA, DENIES PAIN. URINE SAMPLE NEEDED, HOWEVER STOOLS W/ EVERY URINATION. FLUIDS CHANGED FOR HYPOKALEMIA. VSS. MONITORED-AFIB.          Problem: Diarrhea (Adult)  Goal: Improved/Reduced Symptoms  Outcome: Ongoing (interventions implemented as appropriate)    03/12/17 1546   Diarrhea (Adult)   Improved/Reduced Symptoms making progress toward outcome         Problem: Fall Risk (Adult)  Goal: Absence of Falls  Outcome: Ongoing (interventions implemented as appropriate)    03/12/17 1546   Fall Risk (Adult)   Absence of Falls making progress toward outcome

## 2017-03-12 NOTE — CONSULTS
"Adult Nutrition  Assessment/PES    Patient Name:  Agnieszka Rizzo  YOB: 1930  MRN: [id number]  Admit Date:  3/11/2017    Assessment Date:  3/12/2017   Comments:  Assessment completed per protocol, reviewed tolerances, dislikes ensure \"milk\" drinks, but agrees to try ensure clears.  Follow for po intake/tolerance.          Reason for Assessment       03/12/17 1319    Reason for Assessment    Reason For Assessment/Visit admission assessment    Identified At Risk By Screening Criteria MST SCORE 2+;reduced oral intake over the last month    Diagnosis Diagnosis    Cardiac HTN;CHF;PAF    Infectious Disease C. diff    Factors Affecting Nutrition Factors    Reported GI Symptoms Diarrhea                Anthropometrics       03/12/17 1321    Anthropometrics    Height 165.1 cm (65\")    RD Documented Current Weight  57.6 kg (127 lb)    RD Documented Weight on Admission 56.7 kg (125 lb)    Ideal Body Weight (IBW)    Ideal Body Weight (IBW), Female 57.69    Body Mass Index (BMI)    BMI Grade 19.1 - 24.9 - normal            Labs/Tests/Procedures/Meds       03/12/17 1321    Labs/Tests/Procedures/Meds    Diagnostic Test/Procedure Review reviewed    Labs/Tests Review Reviewed;C-Diff;Na+;K+;Glucose;Creat;BUN;Alb    Medication Review Reviewed, pertinent;Antacid;Antibiotic;Anticoag    Significant Vitals reviewed            Physical Findings       03/12/17 1322    Physical Findings/Assessment    Additional Documentation Physical Appearance (Group)    Physical Appearance    Overall Physical Appearance listlessness    Gastrointestinal diarrhea    Skin --   intact- charanjit 20            Estimated/Assessed Needs       03/12/17 1323    Calculation Measurements    Weight Used For Calculations 57.6 kg (127 lb)    Height Used for Calculations 1.651 m (5' 5\")    Estimated/Assessed Energy Needs    Energy Need Method Kcal/kg    kcal/kg 30    30 Kcal/Kg (kcal) 1728.21    Estimated Kcal Range  9372-1940    Estimated/Assessed Protein " Needs    Weight Used for Protein Calculation 57.6 kg (127 lb)    Protein (gm/kg) 1.0    1.0 Gm Protein (gm) 57.61    Estimated Protein Range 58-69    Estimated/Assessed Fluid Needs    Fluid Need Method RDA method    RDA Method (mL)  1728            Nutrition Prescription Ordered       03/12/17 1323    Nutrition Prescription PO    Current PO Diet Regular            Evaluation of Received Nutrient/Fluid Intake       03/12/17 1323    Evaluation of Received Nutrient/Fluid Intake    Nutrition Delivered Fluid Evaluation    Fluid Intake Evaluation    IV Fluid (mL) 2400    Total Fluid Intake (mL) 2400    Total Fluid Intake (mL/kg) 41.66 mL/kg    PO Evaluation    % PO Intake no documentation              Problem/Interventions:        Problem 1       03/12/17 1324    Nutrition Diagnoses Problem 1    Problem 1 Altered GI Function    Etiology (related to) Medical Diagnosis    Gastrointestinal Diarrhea    Infectious Disease C. diff    Signs/Symptoms (evidenced by) Report/Observation    Reported/Observed By Patient    Appetite Poor at this time    Reported GI Symptoms GI distress;Diarrhea    Poor Intake Of Supplements   ensure milk type    Best Intake Of --   agrees to try ensure clear    Other Comment patient requests bland diet                    Intervention Goal       03/12/17 1326    Intervention Goal    General Maintain nutrition    PO Tolerate PO;Increase intake;Meet estimated needs;PO intake (%)    PO Intake % 75 %    Weight Maintain weight            Nutrition Intervention       03/12/17 1326    Nutrition Intervention    RD/Tech Action Advise available snack;Interview for preference;Supplement provided;Recommend/ordered;Encourage intake;Follow Tx progress;Care plan reviewd    Recommended/Ordered Supplement            Nutrition Prescription       03/12/17 1326    Nutrition Prescription PO    PO Prescription Begin/change supplement    Supplement Ensure Clear    Supplement Frequency 3 times a day    New PO Prescription  Ordered? Yes            Education/Evaluation       03/12/17 1326    Education    Education Previous education by GABRIEL/LD;Provided education regarding;Education topics   instructed on 1/9/2017 for high protein diet with handouts    Provided education regarding Diet rationale    Education Topics Protein    Monitor/Evaluation    Monitor Per protocol    Education Follow-up Reinforce PRN            Electronically signed by:  Cinthya Arrieta RD  03/12/17 1:28 PM

## 2017-03-12 NOTE — NURSING NOTE
Darkened areas of skin to coccyx area. No open skin, areas rosita. Patient states that is where she had area of shingles in past.

## 2017-03-12 NOTE — CONSULTS
"Referring Provider: Prinec Hubbard MD  [address] [facility] [address]  [place] [address]    Reason for Consultation: C diff    History of present illness:  Agnieszka is an 85 YO who I am asked to evaluate and give opinion for C diff. History is obtained from the patient and review of the old and outside medical records which I summarize/synthesize as follows: She presented to urgent care on 3/11/17 with about 2 days of watery diarrhea. She reports at least 10 episodes each day. It did not improve with Imodium. She had associated temperature to 100.1 F. She also reports diffuse cramping abdominal pain without alleviating factors. She was recently treated for C diff and finished 2 weeks of Flagyl about 1 week ago.     Of note, she is on MAC and Aspergillus treatment by Dr Tucker and had been referred to my clinic for this as well which was scheduled for 3/16/17. She says her respiratory symptoms are difficult to tease out because of her difficult to control Afib which often exacerbates. She denies fevers, chills, and sweats. She does not have much cough.     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 LVQ - \"it just makes me sick\"  2. PCN (tolerates CTX) - rash > 60 years ago  3. Erythromycin (tolerates clarithromycin)    Medications:    Current Facility-Administered Medications:   •  acetaminophen (TYLENOL) " tablet 650 mg, 650 mg, Oral, Q4H PRN, Prince Hubbard MD  •  acetylcysteine (MUCOMYST) 20 % solution 4 mL, 4 mL, Nebulization, 4x Daily - RT, Prince Hubbard MD  •  atorvastatin (LIPITOR) tablet 40 mg, 40 mg, Oral, Daily, Prince Hubbard MD  •  budesonide-formoterol (SYMBICORT) 160-4.5 MCG/ACT inhaler 2 puff, 2 puff, Inhalation, BID - RT, Prince Hubbard MD  •  cholecalciferol (VITAMIN D3) tablet 1,000 Units, 1,000 Units, Oral, Daily, Prince Hubbard MD  •  dabigatran etexilate (PRADAXA) capsule 150 mg, 150 mg, Oral, Q12H, Prince Hubbard MD, 150 mg at 03/12/17 0120  •  diltiaZEM (CARDIZEM) injection 15 mg, 15 mg, Intravenous, Once **FOLLOWED BY** diltiaZEM (CARDIZEM) 100 mg/100 mL 0.9% NS, 5-15 mg/hr, Intravenous, Titrated **FOLLOWED BY** [START ON 3/14/2017] diltiazem (CARDIZEM) bolus from bag 1 mg/mL 10 mg, 10 mg, Intravenous, Q30 Min PRN, Adria Murry III, MD  •  ipratropium-albuterol (DUO-NEB) nebulizer solution 3 mL, 3 mL, Nebulization, Q4H PRN, Prince Hubbard MD  •  losartan (COZAAR) tablet 50 mg, 50 mg, Oral, Q12H, Prince Hubbard MD  •  ondansetron (ZOFRAN) injection 4 mg, 4 mg, Intravenous, Q6H PRN, Prince Hubbard MD, 4 mg at 03/11/17 2331  •  ondansetron (ZOFRAN) tablet 4 mg, 4 mg, Oral, Q6H PRN **OR** ondansetron ODT (ZOFRAN-ODT) disintegrating tablet 4 mg, 4 mg, Oral, Q6H PRN **OR** ondansetron (ZOFRAN) injection 4 mg, 4 mg, Intravenous, Q6H PRN, Prince Hubbard MD  •  pantoprazole (PROTONIX) EC tablet 40 mg, 40 mg, Oral, Q AM, Prince Hubbard MD, 40 mg at 03/12/17 0758  •  sodium chloride 0.9 % flush 1-10 mL, 1-10 mL, Intravenous, PRN, Prince Hubbard MD  •  sodium chloride 0.9 % flush 10 mL, 10 mL, Intravenous, PRN, Gerhard Chavarria MD  •  sodium chloride 0.9 % infusion, 100 mL/hr, Intravenous, Continuous, Prince Hubbard MD, Last Rate: 100 mL/hr at 03/12/17 0758, 100 mL/hr at 03/12/17 0758  •  vancomycin oral solution 125 mg, 125 mg, Oral, Q6H,  Prince Hubbard MD, 125 mg at 03/12/17 0758      Review of Systems  All systems were reviewed and are negative unless otherwise stated above in the HPI    Objective   Vital Signs   Temp:  [98 °F (36.7 °C)-99.8 °F (37.7 °C)] 98 °F (36.7 °C)  Heart Rate:  [117-138] 124  Resp:  [18-22] 18  BP: (108-145)/(61-89) 111/61    Physical Exam:   General: awake, frail appearing, fatigued  Head: Normocephalic, atraumatic  Eyes: PERRL, EOMI, no scleral icterus, + conjunctival pallor, no conjunctival hemorrhages.   ENT: MMM, OP clear, no thrush. Fair dentition.   Neck: Supple, no visible thyromegaly  Cardiovascular: tachycardic w/ irreg irreg rhythm, no murmurs, rubs, or gallops; no LE edema  Respiratory: Lungs are clear to ascultation bilaterally, no rales or wheezing; normal work of breathing on ambient air  GI: Abdomen is moderately firm and distended, distant bowel sounds in all four quadrants; no hepatosplenomegaly, no masses palpated  : no Long catheter present  Musculoskeletal: no joint abnormalities, normal musculature  Skin: No rashes, lesions, or embolic phenomenon  Neurological: Alert and oriented x 3, cranial nerves 2-12 grossly intact, motor strength 5/5 in all four extremities  Psychiatric: Normal mood and affect   Lymph: no pre-auricular, post-auricular, submandibular, cervical, supraclavicular  LAD  Vasc: no cyanosis; PIV w/o erythema    Labs:     Lab Results   Component Value Date    WBC 25.83 (H) 03/12/2017    HGB 10.2 (L) 03/12/2017    HCT 31.2 (L) 03/12/2017    MCV 94.3 03/12/2017     03/12/2017       Lab Results   Component Value Date    GLUCOSE 105 (H) 03/12/2017    BUN 10 03/12/2017    CREATININE 0.51 (L) 03/12/2017    EGFRIFNONA 114 03/12/2017    BCR 19.6 03/12/2017    CO2 25.1 03/12/2017    CALCIUM 8.4 (L) 03/12/2017    ALBUMIN 3.10 (L) 03/11/2017    LABIL2 1.1 03/11/2017    AST 19 03/11/2017    ALT 15 03/11/2017     3/11 C diff +  LA 1    Microbiology:  3/11 BCx: NGTD    Radiology  (personally reviewed images/report):  CT A/P with colitis and small R pleural effusion    Assessment/Plan   1. Severe recurrent C difficile  -increase vancomycin to 500 mg PO q6h  -start IV Flagyl 500 mg q8h  -supportive care with IVFs  -no probiotics  -repeat CBC and CMP in AM    2. 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 Will d/w Dr Tucker.    3. Hyponatremia  4. Discontinue urinalysis/culture; she has no urinary symptoms  5. Afib with RVR - cardiology following. Started on IV diltiazem.    Thank you for this consult. ID will follow.

## 2017-03-12 NOTE — CONSULTS
Patient Name: Agnieszka Rizzo  :1930  86 y.o.    Date of Admission: 3/11/2017  Date of Consultation:  17  Encounter Provider: Adria Murry III, MD  Place of Service: [facility] CARDIOLOGY  Referring Provider: Prince Hubbard MD  Patient Care Team:  Gabe Horne MD as PCP - General (Internal Medicine)  Gabe Horne MD as PCP - Family Medicine  Marcie Robbins MD as Consulting Physician (Cardiology)  Nilesh Danielle MD as Consulting Physician (Urology)      Chief complaint:  AFib/RVR    History of Present Illness:     Consult to see this patient for atrial fibrillation with RVR in the setting of diarrhea secondary to C. difficile.    Patient has a long history of chronic atrial fibrillation followed by Dr. Robbins.  She was last seen by Dr. Robbins in the office on .  In addition to her chronic atrial fibrillation, she has a history of mild mitral prolapse, nonobstructive CAD, diastolic heart failure, hypertension, and hyperlipidemia.  She has a history of cryoglobulinemia as well as his significant pulmonary history and she's had multiple admissions over the last several months for respiratory issues.  She had a cardioversion in January, attached below, which was unsuccessful and she has been maintained on rate control and anticoagulation.  She was hospitalized in February secondary to hyponatremia secondary to voriconazole.  At that time she was discharged to [facility] for physical therapy.  Recently she has been on antibiotic for a urinary tract infection.    She is very tired and very weak at this point.  She denies chest pain, pressure, tightness, squeezing, or heartburn.  She does note that her heart rate is increased.  She has some mild shortness of breath.  No nausea.  In the emergency room she was given 5 mg of IV diltiazem and then given 30 mg oral diltiazem.  However on the floor she has remained in atrial  "fibrillation with RVR.    Stress test back in December showed no evidence of ischemia.  She has normal left ventricular function.    \"Cardioversion- 1/10/17  One shock of 120 J sync to the R-wave was delivered. She converted to sinus rhythm with frequent premature atrial contractions and then to normal sinus rhythm.   A few minutes later she returned to atrial fibrillation. I shocked her again with 120J sync to the R-wave. She continued in atrial fibrillation. I shocked her a third time at 150 J sync to the R-wave. She returned to normal sinus rhythm. However, she quickly converted back to atrial fibrillation.   Conclusions:  Unsuccessful cardioversion to normal sinus rhythm.\"    Stress Test- 12/19/16  · Myocardial perfusion imaging indicates a normal myocardial perfusion study with no evidence of ischemia.  · Left ventricular ejection fraction is hyperdynamic (Calculated EF > 70%).  · Impressions are consistent with a low risk study.  · There is no prior study available for comparison.    Echo- 11/15/16  · All left ventricular wall segments contract normally.  · Left ventricular function is normal. Estimated EF = 58%.  · Left atrial cavity size is moderately dilated.  · Mild tricuspid valve regurgitation is present.  · RVSP(TR) 32.4 mmHg  · Mild mitral valve regurgitation is present        Past Medical History   Diagnosis Date   • Asthma    • Atrial fibrillation    • Atrial flutter    • Bronchiectasis    • C. difficile diarrhea 3/11/2017   • CAD (coronary artery disease)      nonobstructive   • Colitis    • Cough    • Cryoglobulinemia    • Diastolic heart failure    • Fall    • Hyperlipidemia    • Hypertension    • Hyponatremia    • Hypoxia    • Infectious viral hepatitis      AGE 13   • Leg swelling    • Lesion of lung    • MR (mitral regurgitation)      mild   • MVP (mitral valve prolapse)    • Permanent atrial fibrillation    • Pneumonia with the fungal infection aspergillosis 1/6/2017   • SOB (shortness of " breath)    • UTI (urinary tract infection)    • Wheeze      mild       Past Surgical History   Procedure Laterality Date   • Hysterectomy     • D&c with suction     • Cataract extraction extracapsular w/ intraocular lens implantation     • Knee arthroscopy Left    • Colonoscopy       2013   • Bronchoscopy N/A 11/12/2016     Procedure: BRONCHOSCOPY WITH FLUORO, BRUSHINGS, BAL, AND BIOPSIES;  Surgeon: Rogelio Tucker MD;  Location: [facility] ENDOSCOPY;  Service:          Prior to Admission medications    Medication Sig Start Date End Date Taking? Authorizing Provider   acetylcysteine (MUCOMYST) 20 % nebulizer solution Take 4 mL by nebulization 4 (Four) Times a Day. 1/9/17   Rogelio Tucker MD   acyclovir (ZOVIRAX) 400 MG tablet Take 400 mg by mouth 3 (Three) Times a Day. Take no more than 5 doses a day.    Historical Provider, MD   ADVAIR -21 MCG/ACT inhaler Inhale 2 puffs 2 (Two) Times a Day. 1/3/17   Gerhard Camilo MD   atorvastatin (LIPITOR) 40 MG tablet Take 1 tablet by mouth Daily. 1/19/17   Marcie Robbins MD   B Complex-C (SUPER B COMPLEX PO) Take  by mouth.    Nurse [facility], RN   cholecalciferol (VITAMIN D3) 1000 UNITS tablet Take 1,000 Units by mouth Daily.    Nurse [facility], RN   dabigatran etexilate (PRADAXA) 150 MG capsu Take 1 capsule by mouth Every 12 (Twelve) Hours. 1/5/17   Marcie Robbins MD   diltiaZESURINDER BRYAN (CARDIZEM CD) 240 MG 24 hr capsule Take 1 capsule by mouth Daily. 2/28/17   Marcie Robbins MD   diltiaZEM CD (CARDIZEM CD) 240 MG 24 hr capsule Take 1 capsule by mouth Daily. 2/28/17   Marcie Robbins MD   ezetimibe (ZETIA) 10 MG tablet Take 1 tablet by mouth Daily. 1/19/17   Marcie Robbins MD   furosemide (LASIX) 20 MG tablet Take 20 mg by mouth 2 (Two) Times a Day.    Historical Provider, MD   Glucosamine-Chondroit-Vit C-Mn (GLUCOSAMINE-CHONDROITIN) tablet Take 1 tablet by mouth Daily.    Historical Provider, MD   ipratropium-albuterol (DUO-NEB) 0.5-2.5 mg/mL nebulizer Take 3  mL by nebulization Every 4 (Four) Hours As Needed for wheezing or shortness of air for up to 30 days. 2/11/17 3/13/17  Naman Mckenzie MD   Lactobacillus (ACIDOPHILUS PROBIOTIC) 100 MG capsule Take 1 tablet by mouth 3 (Three) Times a Day. 3/11/17   Ashish Orellana MD   loratadine (ALLERGY RELIEF) 10 MG tablet Take 10 mg by mouth Daily.    Nurse [facility], RN   losartan (COZAAR) 50 MG tablet 50 mg 2 (Two) Times a Day. 6/3/16   Historical Provider, MD   Multiple Vitamin (MULTIVITAMIN) tablet Take 1 tablet by mouth Daily.    Historical Provider, MD   pantoprazole (PROTONIX) 40 MG EC tablet Take 1 tablet by mouth Daily. 1/9/17   Rogelio Tucker MD   VERAMYST 27.5 MCG/SPRAY nasal spray 1 spray into each nostril Daily. 7/25/16   Historical Provider, MD   voriconazole (VFEND) 200 MG tablet Take 200 mg by mouth 2 (Two) Times a Day.    Nurse [facility], RN       Allergies   Allergen Reactions   • Amlodipine Besylate Swelling   • Aspirin      Caused bleeding ulcers   • Bactrim [Sulfamethoxazole-Trimethoprim] Nausea And Vomiting   • Erythromycin    • Levaquin [Levofloxacin]    • Macrobid [Nitrofurantoin] Nausea Only   • Penicillins      Has tolerated ceftriaxone in the past   • Ramipril Other (See Comments)     Cough       Social History     Social History   • Marital status:      Spouse name: N/A   • Number of children: N/A   • Years of education: N/A     Social History Main Topics   • Smoking status: Never Smoker   • Smokeless tobacco: Never Used   • Alcohol use No   • Drug use: No   • Sexual activity: Yes     Partners: Male     Other Topics Concern   • None     Social History Narrative       Family History   Problem Relation Age of Onset   • Hypertension Mother    • Hypertension Father    • Stroke Father    • Cancer Son        REVIEW OF SYSTEMS:   All systems reviewed.  Pertinent positives identified in HPI.  All other systems are negative.      Objective:     Vitals:    03/11/17 2309 03/12/17 0125 03/12/17 0140  03/12/17 0153   BP: 128/80 145/83 123/69 111/61   BP Location: Left arm Right arm Right arm Right arm   Patient Position: Lying Lying Lying Lying   Pulse: (!) 124 (!) 138 (!) 130 (!) 124   Resp: 18      Temp: 98 °F (36.7 °C)      TempSrc: Oral      SpO2: 97%      Weight:       Height:         Body mass index is 21.27 kg/(m^2).    PHYSICAL EXAM: General Appearance:    Alert, cooperative, in no acute distress   Head:    Normocephalic, without obvious abnormality, atraumatic   Eyes:            Lids and lashes normal, conjunctivae and sclerae normal, no   icterus, no pallor, corneas clear, PERRLA   Ears:    Ears appear intact with no abnormalities noted   Throat:   No oral lesions, no thrush, oral mucosa moist   Neck:   No adenopathy, supple, trachea midline, no thyromegaly, no   carotid bruit, no JVD   Back:     No kyphosis present, no scoliosis present, no skin lesions, erythema or scars, no tenderness to percussion or palpation, range of motion normal   Lungs:     Clear to auscultation,respirations regular, even and unlabored    Heart:    irregular rhythm and normal rate, normal S1 and S2, no murmur, no gallop, no rub, no click   Chest Wall:    No abnormalities observed   Abdomen:     Normal bowel sounds, no masses, no organomegaly, soft        non-tender, non-distended, no guarding, no rebound  tenderness   Extremities:   Moves all extremities well, no edema, no cyanosis, no redness   Pulses:   Pulses palpable and equal bilaterally. Normal radial, carotid, femoral, dorsalis pedis and posterior tibial pulses bilaterally. Normal abdominal aorta   Skin:  Psychiatric:   No bleeding, bruising or rash    Alert and oriented x 3, normal mood and affect                                                            Lab Review:       Results from last 7 days  Lab Units 03/11/17  1808   SODIUM mmol/L 129*   POTASSIUM mmol/L 3.7   CHLORIDE mmol/L 91*   TOTAL CO2 mmol/L 23.6   BUN mg/dL 8   CREATININE mg/dL 0.53*   CALCIUM mg/dL  8.4*   BILIRUBIN mg/dL 0.7   ALK PHOS U/L 71   ALT (SGPT) U/L 15   AST (SGOT) U/L 19   GLUCOSE mg/dL 138*           Results from last 7 days  Lab Units 03/11/17  1808   WBC 10*3/mm3 18.40*   HEMOGLOBIN g/dL 10.3*   HEMATOCRIT % 30.7*   PLATELETS 10*3/mm3 367                           I personally viewed and interpreted the patient's EKG/Telemetry data.            Assessment and Plan:       Principal Problem:    C. difficile diarrhea  Active Problems:    Benign essential hypertension    Chronic coronary artery disease    Chronic atrial fibrillation    Hyponatremia    DNR (do not resuscitate)    Sepsis    Chronic diastolic CHF (congestive heart failure)    1.  C. difficile diarrhea  2.  Chronic atrial fibrillation-currently with RVR secondary to #1.  We will initiate IV diltiazem for rate control  3.  History of chronic diastolic CHF-no evidence of volume overload at this point  4.  Hyponatremia  5.  CAD-patient is having no angina pectoris.    Adria Murry III, MD  03/12/17  7:34 AM

## 2017-03-12 NOTE — H&P
Patient Care Team:  Gabe Horne MD as PCP - General (Internal Medicine)  Gabe Horne MD as PCP - Family Medicine  Marcie Robbins MD as Consulting Physician (Cardiology)  Nilesh Danielle MD as Consulting Physician (Urology)    Of note- pt was seen at 1150 on 3/11/17, but completion of this note was delayed until after midnight due to patient care issues.    Chief complaint: diarrhea    Subjective     Nausea   Associated symptoms include chills, coughing, a fever and nausea.   Diarrhea    Associated symptoms include chills, coughing and a fever.   Very pleasant 85 yo female with complicated PMH including Afib, CHF, CAD, pulmonary aspergillosis with recent voriconazole treatment (just finished ~1 week ago). She also has remote history of Cdiff (not sure when this infection was). She has been taking abx recently for UTI as directed by Urology. Starting about 3-4 days ago she began having diarrhea. Progressively worsened to ~10-15 BM/Day. Also associated with abdominal cramping and fever. Came to [facility] ER. Due to suspicion for Cdiff started on po vanc and some IVFs. Feels about the same.     Review of Systems   Constitutional: Positive for chills and fever.   HENT: Negative.    Eyes: Negative.    Respiratory: Positive for cough. Negative for choking.    Cardiovascular: Negative.    Gastrointestinal: Positive for diarrhea and nausea.   Endocrine: Negative.    Genitourinary: Negative.    Musculoskeletal: Negative.    Skin: Negative.    Allergic/Immunologic: Negative.    Neurological: Negative.    Hematological: Negative.    Psychiatric/Behavioral: Negative.     Ext- slight leg swelling, no history of blood clots    Past Medical History   Diagnosis Date   • A-fib    • Abdominal distension    • Acute hypoxemic respiratory failure    • Aspergillosis    • Asthma    • Atrial fibrillation    • Atrial flutter    • Bigeminy    • Bronchiectasis    • C. difficile diarrhea 3/11/2017   • CAD (coronary  artery disease)    • Chest tightness    • Colitis    • Cough    • Cryoglobulinemia    • Diastolic heart failure    • Dyspnea    • Fall    • History of pneumonia      MAY 2016   • Hyperlipidemia    • Hypertension    • Hyponatremia    • Hypoxia    • Infectious viral hepatitis      AGE 13   • Leg swelling    • Lesion of lung    • MR (mitral regurgitation)      mild   • MVP (mitral valve prolapse)    • PAF (paroxysmal atrial fibrillation)    • Permanent atrial fibrillation    • Pneumonia    • Pneumonia with the fungal infection aspergillosis 1/6/2017   • SOB (shortness of breath)    • UTI (urinary tract infection)    • Weakness    • Wheeze      mild     Past Surgical History   Procedure Laterality Date   • Hysterectomy     • D&c with suction     • Cataract extraction extracapsular w/ intraocular lens implantation     • Knee arthroscopy Left    • Colonoscopy       2013   • Bronchoscopy N/A 11/12/2016     Procedure: BRONCHOSCOPY WITH FLUORO, BRUSHINGS, BAL, AND BIOPSIES;  Surgeon: Rogelio Tucker MD;  Location: [facility] ENDOSCOPY;  Service:      Family History   Problem Relation Age of Onset   • Hypertension Mother    • Hypertension Father    • Stroke Father    • Cancer Son      Social History   Substance Use Topics   • Smoking status: Never Smoker   • Smokeless tobacco: Never Used   • Alcohol use No     Prescriptions Prior to Admission   Medication Sig Dispense Refill Last Dose   • acetylcysteine (MUCOMYST) 20 % nebulizer solution Take 4 mL by nebulization 4 (Four) Times a Day. 500 mL 1 Taking   • acyclovir (ZOVIRAX) 400 MG tablet Take 400 mg by mouth 3 (Three) Times a Day. Take no more than 5 doses a day.   Taking   • ADVAIR -21 MCG/ACT inhaler Inhale 2 puffs 2 (Two) Times a Day. 1 inhaler 0 Taking   • atorvastatin (LIPITOR) 40 MG tablet Take 1 tablet by mouth Daily. 90 tablet 3 Taking   • B Complex-C (SUPER B COMPLEX PO) Take  by mouth.   Taking   • cholecalciferol (VITAMIN D3) 1000 UNITS tablet Take 1,000 Units by  mouth Daily.   Taking   • dabigatran etexilate (PRADAXA) 150 MG capsu Take 1 capsule by mouth Every 12 (Twelve) Hours. 180 capsule 3 Taking   • diltiaZEM CD (CARDIZEM CD) 240 MG 24 hr capsule Take 1 capsule by mouth Daily. 90 capsule 3    • diltiaZEM CD (CARDIZEM CD) 240 MG 24 hr capsule Take 1 capsule by mouth Daily. 14 capsule 0    • ezetimibe (ZETIA) 10 MG tablet Take 1 tablet by mouth Daily. 90 tablet 3 Taking   • furosemide (LASIX) 20 MG tablet Take 20 mg by mouth 2 (Two) Times a Day.   Taking   • Glucosamine-Chondroit-Vit C-Mn (GLUCOSAMINE-CHONDROITIN) tablet Take 1 tablet by mouth Daily.   Taking   • ipratropium-albuterol (DUO-NEB) 0.5-2.5 mg/mL nebulizer Take 3 mL by nebulization Every 4 (Four) Hours As Needed for wheezing or shortness of air for up to 30 days. 360 mL 5 Taking   • Lactobacillus (ACIDOPHILUS PROBIOTIC) 100 MG capsule Take 1 tablet by mouth 3 (Three) Times a Day. 60 capsule 0    • loratadine (ALLERGY RELIEF) 10 MG tablet Take 10 mg by mouth Daily.   Taking   • losartan (COZAAR) 50 MG tablet 50 mg 2 (Two) Times a Day.   Taking   • Multiple Vitamin (MULTIVITAMIN) tablet Take 1 tablet by mouth Daily.   Taking   • pantoprazole (PROTONIX) 40 MG EC tablet Take 1 tablet by mouth Daily. 30 tablet 1 Taking   • VERAMYST 27.5 MCG/SPRAY nasal spray 1 spray into each nostril Daily.   Taking   • voriconazole (VFEND) 200 MG tablet Take 200 mg by mouth 2 (Two) Times a Day.   Taking     Allergies:  Amlodipine besylate; Aspirin; Bactrim [sulfamethoxazole-trimethoprim]; Erythromycin; Levaquin [levofloxacin]; Macrobid [nitrofurantoin]; Penicillins; and Ramipril    Objective      Vital Signs  Temp:  [98 °F (36.7 °C)-99.8 °F (37.7 °C)] 98 °F (36.7 °C)  Heart Rate:  [117-135] 124  Resp:  [18-22] 18  BP: (108-140)/(65-89) 128/80    Physical Exam   Constitutional: She is oriented to person, place, and time. She appears well-developed and well-nourished. She appears distressed (slight).   HENT:   Head: Normocephalic  and atraumatic.   Dry MM   Eyes: EOM are normal. Pupils are equal, round, and reactive to light. No scleral icterus.   Neck: Normal range of motion. Neck supple.   Cardiovascular:   Irregular, tachycardic   Pulmonary/Chest: Effort normal. No respiratory distress.   Abdominal: Soft. She exhibits no distension. There is no tenderness.   Genitourinary:   Genitourinary Comments: Deferred    Musculoskeletal: She exhibits no edema or deformity.   Neurological: She is alert and oriented to person, place, and time. No cranial nerve deficit.   Skin: Skin is warm and dry. No rash noted.   Psychiatric: She has a normal mood and affect. Her behavior is normal. Thought content normal.   Nursing note and vitals reviewed.      Results Review:   I reviewed the patient's new clinical results.    Clostridium Difficile Toxin, PCR [[id number]] (Abnormal) Collected: 03/11/17 2013        Lab Status: Final result Specimen: Stool from Per Rectum Updated: 03/11/17 2209        C. Difficile Toxins by PCR Positive (C)        Urinalysis With / Culture If Indicated [[id number]]        Lab Status: No result Specimen: Urine from Urine, Catheter        Blood Culture [[id number]] Collected: 03/11/17 1810       Lab Status: In process Specimen: Blood from Arm, Right Updated: 03/11/17 1813       Comprehensive Metabolic Panel [[id number]] (Abnormal) Collected: 03/11/17 1808       Lab Status: Final result Specimen: Blood Updated: 03/11/17 1850        Glucose 138 (H) mg/dL         BUN 8 mg/dL         Creatinine 0.53 (L) mg/dL         Sodium 129 (L) mmol/L         Potassium 3.7 mmol/L         Chloride 91 (L) mmol/L         CO2 23.6 mmol/L         Calcium 8.4 (L) mg/dL         Total Protein 6.0 g/dL         Albumin 3.10 (L) g/dL         ALT (SGPT) 15 U/L         AST (SGOT) 19 U/L         Alkaline Phosphatase 71 U/L         Total Bilirubin 0.7 mg/dL         eGFR Non African Amer 109 mL/min/1.73         Globulin 2.9 gm/dL         A/G Ratio 1.1 g/dL          BUN/Creatinine Ratio 15.1         Anion Gap 14.4 mmol/L        Narrative:         The MDRD GFR formula is only valid for adults with stable renal function between ages 18 and 70.       Lactic Acid, Plasma [[id number]] (Normal) Collected: 03/11/17 1808       Lab Status: Final result Specimen: Blood Updated: 03/11/17 1836        Lactate 1.0 mmol/L        [place] Draw [[id number]] Collected: 03/11/17 1808       Lab Status: Final result Specimen: Blood Updated: 03/11/17 2301       Narrative:         The following orders were created for panel order [place] Draw.  Procedure                               Abnormality         Status                     ---------                               -----------         ------                     Light Blue Top[[id number]]                                    Final result               Green Top (Gel)[[id number]]                                   Final result               Lavender Top[[id number]]                                      Final result               Gold Top - SST[[id number]]                                    Final result                 Please view results for these tests on the individual orders.       Blood Culture [[id number]] Collected: 03/11/17 1808       Lab Status: In process Specimen: Blood from Arm, Left Updated: 03/11/17 1813       CBC Auto Differential [[id number]] (Abnormal) Collected: 03/11/17 1808       Lab Status: Final result Specimen: Blood Updated: 03/11/17 1856        WBC 18.40 (H) 10*3/mm3         RBC 3.30 (L) 10*6/mm3         Hemoglobin 10.3 (L) g/dL         Hematocrit 30.7 (L) %         MCV 93.0 fL         MCH 31.2 pg         MCHC 33.6 g/dL         RDW 15.5 (H) %         RDW-SD 53.1 fl         MPV 9.1 fL         Platelets 367 10*3/mm3         Neutrophil % 88.4 (H) %         Lymphocyte % 3.8 (L) %         Monocyte % 6.8 %         Eosinophil % 0.4 %         Basophil % 0.2 %         Immature Grans % 0.4 %         Neutrophils, Absolute 16.27 (H) 10*3/mm3          Lymphocytes, Absolute 0.70 (L) 10*3/mm3         Monocytes, Absolute 1.26 (H) 10*3/mm3         Eosinophils, Absolute 0.07 10*3/mm3         Basophils, Absolute 0.03 10*3/mm3         Immature Grans, Absolute 0.07 (H) 10*3/mm3        Scan Slide [[id number]] Collected: 17 1808       Lab Status: Final result Specimen: Blood Updated: 17 1856        Crenated RBC's Mod/2+         Poikilocytes Mod/2+         WBC Morphology Normal         Platelet Morphology Normal        CT Abdomen Pelvis Without Contrast [[id number]] Not Reviewed       Order Status: Completed Collected: 17        Updated: 17       Narrative:         CT SCAN OF THE ABDOMEN AND PELVIS WITHOUT CONTRAST     HISTORY: Abdominal pain and diarrhea.     The CT scan was performed as an emergency procedure through the abdomen  and pelvis without contrast and demonstrates the followin. There is generalized wall thickening and inflammatory change  throughout the colon most consistent with C. difficile colitis and  further clinical correlation is recommended. The small bowel loops are  normal in caliber.     2. There is a tiny right pleural effusion and there is some strandlike  atelectasis at the lung bases. There are several small cysts scattered  in the liver and these are unchanged from 2009. The spleen,  pancreas, and both adrenal glands are unremarkable.     3. The right kidney appears normal. The left kidney contains a prominent  cyst measuring 7.3 cm which is unchanged. There is no aortic aneurysm or  retroperitoneal lymphadenopathy.     4. In the pelvis, there is a very small amount of free fluid consistent  with the generalized colitis. The remainder of the CT scan is  unremarkable.     This report was finalized on 3/11/2017 9:46 PM by Dr. Milo Castillo MD.          XR Chest 1 View [[id number]] Not Reviewed       Order Status: Completed Collected: 17        Updated: 17       Narrative:         ONE  VIEW PORTABLE CHEST     HISTORY: Fever. Atrial fibrillation.     There is prominent cardiomegaly but no evidence of overt CHF and this is  unchanged from 02/08/2017. There is now some localized haziness at the  right base medially associated with a tiny right pleural effusion and  suspicious for an area of developing pneumonia.               Assessment/Plan     Principal Problem:    C. difficile diarrhea  Active Problems:    Permanent atrial fibrillation    DNR (do not resuscitate)    Sepsis    Chronic diastolic CHF (congestive heart failure)    -PO vanc for Cdiff  -Continue home a/c for Afib. Give extra dose of IV and PO diltiazem to improve rate control. Monitor for improvement, Cardiology consult. May need diltiazem gtt if rate not improved  -Gentle IVFs with dehydration. Much caution with history of CHF, as well as history of SIADH. Monitor volume status and BMP.   -DNR/DNI- confirmed with PT and will order.    Assessment & Plan    I discussed the patients findings and my recommendations with patient, nursing staff and consulting provider- ER physician    Of note- pt was seen at 1150 on 3/11/17, but completion of this note was delayed until after midnight due to patient care issues.    Prince Hubbard MD  03/12/17  12:52 AM

## 2017-03-12 NOTE — PLAN OF CARE
Problem: Patient Care Overview (Adult)  Goal: Plan of Care Review  Outcome: Ongoing (interventions implemented as appropriate)    03/12/17 0505   Coping/Psychosocial Response Interventions   Plan Of Care Reviewed With patient   Patient Care Overview   Progress no change   Outcome Evaluation   Outcome Summary/Follow up Plan Up to bathroom for frequent mucousy stool. Denies pain. Medicated once for nausea. Heart rate decreasing after cardizem given. consults called as ordered.       Goal: Adult Individualization and Mutuality  Outcome: Ongoing (interventions implemented as appropriate)  Goal: Discharge Needs Assessment  Outcome: Ongoing (interventions implemented as appropriate)    Problem: Diarrhea (Adult)  Goal: Identify Related Risk Factors and Signs and Symptoms  Outcome: Outcome(s) achieved Date Met:  03/12/17  Goal: Improved/Reduced Symptoms  Outcome: Ongoing (interventions implemented as appropriate)    Problem: Fall Risk (Adult)  Goal: Identify Related Risk Factors and Signs and Symptoms  Outcome: Outcome(s) achieved Date Met:  03/12/17  Goal: Absence of Falls  Outcome: Ongoing (interventions implemented as appropriate)

## 2017-03-13 ENCOUNTER — APPOINTMENT (OUTPATIENT)
Dept: GENERAL RADIOLOGY | Facility: HOSPITAL | Age: 82
End: 2017-03-13
Attending: HOSPITALIST

## 2017-03-13 LAB
ALBUMIN SERPL-MCNC: 2.4 G/DL (ref 3.5–5.2)
ALBUMIN/GLOB SERPL: 0.9 G/DL
ALP SERPL-CCNC: 60 U/L (ref 39–117)
ALT SERPL W P-5'-P-CCNC: 15 U/L (ref 1–33)
ANION GAP SERPL CALCULATED.3IONS-SCNC: 12.3 MMOL/L
AST SERPL-CCNC: 24 U/L (ref 1–32)
BILIRUB SERPL-MCNC: 0.8 MG/DL (ref 0.1–1.2)
BUN BLD-MCNC: 22 MG/DL (ref 8–23)
BUN/CREAT SERPL: 32.4 (ref 7–25)
CALCIUM SPEC-SCNC: 8 MG/DL (ref 8.6–10.5)
CHLORIDE SERPL-SCNC: 97 MMOL/L (ref 98–107)
CO2 SERPL-SCNC: 23.7 MMOL/L (ref 22–29)
CREAT BLD-MCNC: 0.68 MG/DL (ref 0.57–1)
DEPRECATED RDW RBC AUTO: 54 FL (ref 37–54)
ERYTHROCYTE [DISTWIDTH] IN BLOOD BY AUTOMATED COUNT: 15.7 % (ref 11.7–13)
GFR SERPL CREATININE-BSD FRML MDRD: 82 ML/MIN/1.73
GLOBULIN UR ELPH-MCNC: 2.8 GM/DL
GLUCOSE BLD-MCNC: 118 MG/DL (ref 65–99)
HCT VFR BLD AUTO: 29 % (ref 35.6–45.5)
HGB BLD-MCNC: 9.7 G/DL (ref 11.9–15.5)
MCH RBC QN AUTO: 31.6 PG (ref 26.9–32)
MCHC RBC AUTO-ENTMCNC: 33.4 G/DL (ref 32.4–36.3)
MCV RBC AUTO: 94.5 FL (ref 80.5–98.2)
PLATELET # BLD AUTO: 379 10*3/MM3 (ref 140–500)
PMV BLD AUTO: 9.7 FL (ref 6–12)
POTASSIUM BLD-SCNC: 3.6 MMOL/L (ref 3.5–5.2)
PROT SERPL-MCNC: 5.2 G/DL (ref 6–8.5)
RBC # BLD AUTO: 3.07 10*6/MM3 (ref 3.9–5.2)
SODIUM BLD-SCNC: 133 MMOL/L (ref 136–145)
WBC NRBC COR # BLD: 24.2 10*3/MM3 (ref 4.5–10.7)

## 2017-03-13 PROCEDURE — 74000 HC ABDOMEN KUB: CPT

## 2017-03-13 PROCEDURE — 99232 SBSQ HOSP IP/OBS MODERATE 35: CPT | Performed by: INTERNAL MEDICINE

## 2017-03-13 PROCEDURE — 94799 UNLISTED PULMONARY SVC/PX: CPT

## 2017-03-13 PROCEDURE — 85027 COMPLETE CBC AUTOMATED: CPT | Performed by: INTERNAL MEDICINE

## 2017-03-13 PROCEDURE — 25010000002 ONDANSETRON PER 1 MG: Performed by: INTERNAL MEDICINE

## 2017-03-13 PROCEDURE — 25810000003 SODIUM CHLORIDE 0.9 % WITH KCL 20 MEQ 20-0.9 MEQ/L-% SOLUTION: Performed by: HOSPITALIST

## 2017-03-13 PROCEDURE — 80053 COMPREHEN METABOLIC PANEL: CPT | Performed by: INTERNAL MEDICINE

## 2017-03-13 RX ORDER — DILTIAZEM HYDROCHLORIDE 240 MG/1
240 CAPSULE, COATED, EXTENDED RELEASE ORAL
Status: DISCONTINUED | OUTPATIENT
Start: 2017-03-13 | End: 2017-03-14

## 2017-03-13 RX ADMIN — DABIGATRAN ETEXILATE MESYLATE 150 MG: 150 CAPSULE ORAL at 21:20

## 2017-03-13 RX ADMIN — VANCOMYCIN 500 MG: KIT at 06:20

## 2017-03-13 RX ADMIN — METRONIDAZOLE 500 MG: 500 INJECTION, SOLUTION INTRAVENOUS at 08:14

## 2017-03-13 RX ADMIN — LOSARTAN POTASSIUM 50 MG: 50 TABLET, FILM COATED ORAL at 21:20

## 2017-03-13 RX ADMIN — ONDANSETRON 4 MG: 2 INJECTION INTRAMUSCULAR; INTRAVENOUS at 12:27

## 2017-03-13 RX ADMIN — PANTOPRAZOLE SODIUM 40 MG: 40 TABLET, DELAYED RELEASE ORAL at 06:20

## 2017-03-13 RX ADMIN — VANCOMYCIN 500 MG: KIT at 12:32

## 2017-03-13 RX ADMIN — BUDESONIDE AND FORMOTEROL FUMARATE DIHYDRATE 2 PUFF: 160; 4.5 AEROSOL RESPIRATORY (INHALATION) at 19:59

## 2017-03-13 RX ADMIN — METRONIDAZOLE 500 MG: 500 INJECTION, SOLUTION INTRAVENOUS at 00:38

## 2017-03-13 RX ADMIN — METRONIDAZOLE 500 MG: 500 INJECTION, SOLUTION INTRAVENOUS at 17:50

## 2017-03-13 RX ADMIN — ATORVASTATIN CALCIUM 40 MG: 40 TABLET, FILM COATED ORAL at 08:14

## 2017-03-13 RX ADMIN — VANCOMYCIN 500 MG: KIT at 17:50

## 2017-03-13 RX ADMIN — BUDESONIDE AND FORMOTEROL FUMARATE DIHYDRATE 2 PUFF: 160; 4.5 AEROSOL RESPIRATORY (INHALATION) at 07:48

## 2017-03-13 RX ADMIN — POTASSIUM CHLORIDE AND SODIUM CHLORIDE 100 ML/HR: 900; 150 INJECTION, SOLUTION INTRAVENOUS at 04:19

## 2017-03-13 RX ADMIN — LOSARTAN POTASSIUM 50 MG: 50 TABLET, FILM COATED ORAL at 08:14

## 2017-03-13 RX ADMIN — DABIGATRAN ETEXILATE MESYLATE 150 MG: 150 CAPSULE ORAL at 08:14

## 2017-03-13 RX ADMIN — VITAMIN D, TAB 1000IU (100/BT) 1000 UNITS: 25 TAB at 08:14

## 2017-03-13 RX ADMIN — DILTIAZEM HYDROCHLORIDE 240 MG: 240 CAPSULE, COATED, EXTENDED RELEASE ORAL at 12:32

## 2017-03-13 NOTE — PLAN OF CARE
Problem: Patient Care Overview (Adult)  Goal: Plan of Care Review  Outcome: Ongoing (interventions implemented as appropriate)    03/13/17 0555   Coping/Psychosocial Response Interventions   Plan Of Care Reviewed With patient   Patient Care Overview   Progress improving   Outcome Evaluation   Outcome Summary/Follow up Plan pt had Cardizem drip at start of shift, placed on hold due to low BP , no nausea reported, fluids continue, no pain reported, bedside commode in use, mulitple tiny BM , diet GI SOFT per pt request, clear ensure added for support,        Goal: Adult Individualization and Mutuality  Outcome: Ongoing (interventions implemented as appropriate)    Problem: Diarrhea (Adult)  Goal: Improved/Reduced Symptoms  Outcome: Ongoing (interventions implemented as appropriate)    Problem: Fall Risk (Adult)  Goal: Absence of Falls  Outcome: Ongoing (interventions implemented as appropriate)

## 2017-03-13 NOTE — SIGNIFICANT NOTE
03/13/17 1555   Rehab Treatment   Discipline physical therapy assistant   Treatment Not Performed patient/family decline treatment, pt not feeling well  (asked us to check back in pm tomorrow, she states she does not do well in the mornings; today is nauseated this pm and declined)   Recommendation   PT - Next Appointment 03/14/17

## 2017-03-13 NOTE — PLAN OF CARE
Problem: Patient Care Overview (Adult)  Goal: Discharge Needs Assessment  Outcome: Ongoing (interventions implemented as appropriate)    Problem: Diarrhea (Adult)  Goal: Improved/Reduced Symptoms  Outcome: Ongoing (interventions implemented as appropriate)    Problem: Fall Risk (Adult)  Goal: Absence of Falls  Outcome: Ongoing (interventions implemented as appropriate)

## 2017-03-13 NOTE — PLAN OF CARE
Problem: Patient Care Overview (Adult)  Goal: Plan of Care Review  Outcome: Ongoing (interventions implemented as appropriate)    03/13/17 1950   Coping/Psychosocial Response Interventions   Plan Of Care Reviewed With patient   Patient Care Overview   Progress no change   Outcome Evaluation   Outcome Summary/Follow up Plan PO CARDIZEM; MEDICATED X1 FOR VAGUE NAUSEA       Goal: Adult Individualization and Mutuality  Outcome: Ongoing (interventions implemented as appropriate)    03/13/17 0555   Individualization   Patient Specific Preferences ic chips for dry mouth   Patient Specific Goals control diarrhea    Patient Specific Interventions iv fluids, monitor vitals, clean skin   Mutuality/Individual Preferences   What Anxieties, Fears or Concerns Do You Have About Your Health or Care? none   What Questions Do You Have About Your Health or Care? none   What Information Would Help Us Give You More Personalized Care? none

## 2017-03-13 NOTE — PROGRESS NOTES
Discharge Planning Assessment  [facility]     Patient Name: Agnieszka Rizzo  MRN: [id number]  Today's Date: 3/13/2017    Admit Date: 3/11/2017          Discharge Needs Assessment       03/13/17 1559    Discharge Needs Assessment    Equipment Currently Used at Home oxygen    Transportation Available car      03/13/17 1547    Living Environment    Lives With alone    Living Arrangements --   patio home    Quality Of Family Relationships involved    Discharge Needs Assessment    Concerns To Be Addressed basic needs concerns    Readmission Within The Last 30 Days current reason for admission unrelated to previous admission            Discharge Plan       03/13/17 1542    Case Management/Social Work Plan    Plan Home with help from family     Additional Comments Spoke with pt at bedside. Role of CCP explained. Facesheet info verified. Pt lives in a patio home without any steps. Pt stated if she needs someone to stay with her and help her after dc then her dtr or lisbetter would be able to. Pt has been to [facility] in the past. Pt has used [facility]  in the past. Pt stated she is set up with [facility] outpt therapy and plans to continue that at [place]. Pt uses O2 at  provieded by [facility]. Pt would like to change her PCP to a [facility]. Left message for [facility] liaison, awaiting a call back. CCP to follow.         Discharge Placement     No information found                Demographic Summary     None            Functional Status     None            Psychosocial     None            Abuse/Neglect     None            Legal     None            Substance Abuse     None            Patient Forms     None          Marcie Siddiqui, RN

## 2017-03-13 NOTE — PROGRESS NOTES
"Patient Name: Agnieszka Rizzo  :1930  86 y.o.      Patient Care Team:  Gabe Horne MD as PCP - General (Internal Medicine)  Gabe Horne MD as PCP - Family Medicine  Marcie Robbins MD as Consulting Physician (Cardiology)  Nilesh Danielle MD as Consulting Physician (Urology)    Interval History:   On and off dilt drip    Subjective:  Following for a fib     Objective   Vital Signs  Temp:  [97.3 °F (36.3 °C)-99.3 °F (37.4 °C)] 97.8 °F (36.6 °C)  Heart Rate:  [] 110  Resp:  [14-16] 16  BP: ()/(48-72) 107/72    Intake/Output Summary (Last 24 hours) at 17 0925  Last data filed at 17 0900   Gross per 24 hour   Intake   1845 ml   Output    100 ml   Net   1745 ml     Flowsheet Rows         First Filed Value    Admission Height  65\" (165.1 cm) Documented at 2017 1745    Admission Weight  125 lb (56.7 kg) Documented at 2017 1745          Physical Exam:   General Appearance:    Alert, cooperative, in no acute distress   Lungs:     Clear to auscultation.  Normal respiratory effort and rate.      Heart:   irreg irreg. tachy.     Chest Wall:    No abnormalities observed   Abdomen:     Soft, nontender, positive bowel sounds.     Extremities:   no cyanosis, clubbing or edema.  No marked joint deformities.  Adequate musculoskeletal strength.       Results Review:      Results from last 7 days  Lab Units 17  0540   SODIUM mmol/L 133*   POTASSIUM mmol/L 3.6   CHLORIDE mmol/L 97*   TOTAL CO2 mmol/L 23.7   BUN mg/dL 22   CREATININE mg/dL 0.68   GLUCOSE mg/dL 118*   CALCIUM mg/dL 8.0*           Results from last 7 days  Lab Units 17  0540   WBC 10*3/mm3 24.20*   HEMOGLOBIN g/dL 9.7*   HEMATOCRIT % 29.0*   PLATELETS 10*3/mm3 379                         Medication Review:     atorvastatin 40 mg Oral Daily   budesonide-formoterol 2 puff Inhalation BID - RT   cholecalciferol 1,000 Units Oral Daily   dabigatran etexilate 150 mg Oral Q12H   losartan 50 mg Oral Q12H "   metroNIDAZOLE 500 mg Intravenous Q8H   pantoprazole 40 mg Oral Q AM   vancomycin 500 mg Oral Q6H          diltiaZEM 5-15 mg/hr Last Rate: 15 mg/hr (03/12/17 6341)   sodium chloride 0.9 % with KCl 20 mEq 100 mL/hr Last Rate: 100 mL/hr (03/13/17 8785)       Assessment/Plan     1. Atrial fibrillation. She failed cardioversion in January 2017. She has a CHADS2-Vasc score of 5.  She is anticoagulated with Pradaxa.  She is generally rate controlled.  She is not a candidate for beta blockers because of lung disease.  2. Diastolic heart failure.   3. Dyspnea on exertion. Multifactorial  4. Mitral valve prolapse with very mild mitral regurgitation.  5. Mild tricuspid regurgitation without pulmonary hypertension.  6. Nonobstructive coronary artery disease diagnosed by catheter in 2007. She has noted coronary artery calcification on CT scans. Nuclear stress 12/16 was normal.   7. Hypertension. Her blood pressure is controlled.  8. Hyperlipidemia. Zetia and atorvastatin  9. Hyponatremia. Stable.  10.  C. difficile diarrhea.    - restart home dose of diltiazem.     Marcie Robbins MD, [facility]  03/13/17  9:25 AM

## 2017-03-13 NOTE — PROGRESS NOTES
LOS: 2 days     Name: Agnieszka Rizzo  Age/Sex: 86 y.o. female  :  1930        PCP: Gabe Horne MD    Subjective   Still with poor appetite and multiple diarrhea stools today still with cramping and pain at times  General: No Fever or Chills, Cardiac: No Chest Pain or Palpitations, Resp: No Cough or SOA and Other: No bleeding      atorvastatin 40 mg Oral Daily   budesonide-formoterol 2 puff Inhalation BID - RT   cholecalciferol 1,000 Units Oral Daily   dabigatran etexilate 150 mg Oral Q12H   losartan 50 mg Oral Q12H   metroNIDAZOLE 500 mg Intravenous Q8H   pantoprazole 40 mg Oral Q AM   vancomycin 500 mg Oral Q6H       diltiaZEM 5-15 mg/hr Last Rate: 15 mg/hr (17 9960)   sodium chloride 0.9 % with KCl 20 mEq 100 mL/hr Last Rate: 100 mL/hr (17 677)       Objective   Vital Signs  Temp:  [97.3 °F (36.3 °C)-99.3 °F (37.4 °C)] 97.8 °F (36.6 °C)  Heart Rate:  [] 110  Resp:  [14-16] 16  BP: ()/(48-74) 107/72  Body mass index is 21.27 kg/(m^2).    Intake/Output Summary (Last 24 hours) at 17 0849  Last data filed at 17   Gross per 24 hour   Intake   1735 ml   Output    100 ml   Net   1635 ml       General:  Resting comfortably no active distress  Eyes:  PERRL; no conj pallor; EOMI; no scleral icterus  ENT:  oral mucosa moist; pharynx w/o exudate; ext inspect WNL  Neck: nl movement; no JVD; no adenopathy; thyroid WNL  Lungs:  CTA; good air entry; Non labored; No wheeze, No Crackle, No Rhonchi  Cardiac:  RRR; no murmurs;  nl S1/S2  Abd:  soft; mil global tenderness; non distended; no HSM; no masses; BS +  : deferred  Musc/Skel: no arthropathy, or deformity   Skin:  warm and perfused; no apparent rash on examined areas  Neuro: CN grossly intact; no focal deficit of strength or sensory   Ext/P Vasc:  peripheral pulses 2+; no clubbing ; no cyanosis; no edema  MS & Psychiatric: A&O x  3; memory intact; affect/mood appropriate    Results Review:       I reviewed the  patient's new clinical results.    Results from last 7 days  Lab Units 03/13/17  0540 03/12/17  0642 03/11/17  1808   WBC 10*3/mm3 24.20* 25.83* 18.40*   HEMOGLOBIN g/dL 9.7* 10.2* 10.3*   PLATELETS 10*3/mm3 379 361 367     Results from last 7 days  Lab Units 03/13/17  0540 03/12/17  0642 03/11/17  1808   SODIUM mmol/L 133* 131* 129*   POTASSIUM mmol/L 3.6 3.3* 3.7   CHLORIDE mmol/L 97* 92* 91*   TOTAL CO2 mmol/L 23.7 25.1 23.6   BUN mg/dL 22 10 8   CREATININE mg/dL 0.68 0.51* 0.53*   CALCIUM mg/dL 8.0* 8.4* 8.4*   Estimated Creatinine Clearance: 45.4 mL/min (by C-G formula based on Cr of 0.68).      Assessment/Plan   Principal Problem:    C. difficile diarrhea  Active Problems:    Benign essential hypertension    Chronic coronary artery disease    Chronic atrial fibrillation    Hyponatremia    DNR (do not resuscitate)    Sepsis    Chronic diastolic CHF (congestive heart failure)      PLAN  - continue antibiotics  - XR ordered this AM shows no ileus or megacolon  - monitor hemoglobin no bleeding noted likely dilutional  - remains on diltiazem gtt oral meds started titrate per cards recs  - Na will continue to improve with treatment of underlying condition    Disposition        Lon Chilel MD  [facility]  03/13/17  8:49 AM      EMR Dragon/Transcription disclaimer:     Much of this encounter note is an electronic transcription/translation of spoken language to printed text. The electronic translation of spoken language may permit erroneous, or at times, nonsensical words or phrases to be inadvertently transcribed; Although I have reviewed the note for such errors, some may still exist.

## 2017-03-14 LAB
CREAT BLD-MCNC: 0.46 MG/DL (ref 0.57–1)
DEPRECATED RDW RBC AUTO: 53.9 FL (ref 37–54)
ERYTHROCYTE [DISTWIDTH] IN BLOOD BY AUTOMATED COUNT: 15.9 % (ref 11.7–13)
GFR SERPL CREATININE-BSD FRML MDRD: 129 ML/MIN/1.73
HCT VFR BLD AUTO: 28 % (ref 35.6–45.5)
HGB BLD-MCNC: 9.6 G/DL (ref 11.9–15.5)
MCH RBC QN AUTO: 32 PG (ref 26.9–32)
MCHC RBC AUTO-ENTMCNC: 34.3 G/DL (ref 32.4–36.3)
MCV RBC AUTO: 93.3 FL (ref 80.5–98.2)
PLATELET # BLD AUTO: 396 10*3/MM3 (ref 140–500)
PMV BLD AUTO: 9.9 FL (ref 6–12)
RBC # BLD AUTO: 3 10*6/MM3 (ref 3.9–5.2)
WBC NRBC COR # BLD: 19.13 10*3/MM3 (ref 4.5–10.7)

## 2017-03-14 PROCEDURE — 25010000002 ONDANSETRON PER 1 MG: Performed by: INTERNAL MEDICINE

## 2017-03-14 PROCEDURE — 85027 COMPLETE CBC AUTOMATED: CPT | Performed by: INTERNAL MEDICINE

## 2017-03-14 PROCEDURE — 99232 SBSQ HOSP IP/OBS MODERATE 35: CPT | Performed by: INTERNAL MEDICINE

## 2017-03-14 PROCEDURE — 25810000003 SODIUM CHLORIDE 0.9 % WITH KCL 20 MEQ 20-0.9 MEQ/L-% SOLUTION: Performed by: HOSPITALIST

## 2017-03-14 PROCEDURE — 82565 ASSAY OF CREATININE: CPT | Performed by: INTERNAL MEDICINE

## 2017-03-14 PROCEDURE — 25010000002 VANCOMYCIN PER 500 MG: Performed by: INTERNAL MEDICINE

## 2017-03-14 PROCEDURE — 94799 UNLISTED PULMONARY SVC/PX: CPT

## 2017-03-14 RX ORDER — DILTIAZEM HYDROCHLORIDE 180 MG/1
360 CAPSULE, COATED, EXTENDED RELEASE ORAL
Status: DISCONTINUED | OUTPATIENT
Start: 2017-03-14 | End: 2017-03-20 | Stop reason: HOSPADM

## 2017-03-14 RX ADMIN — VANCOMYCIN 500 MG: KIT at 11:53

## 2017-03-14 RX ADMIN — VANCOMYCIN HYDROCHLORIDE 500 MG: 1 INJECTION, POWDER, LYOPHILIZED, FOR SOLUTION INTRAVENOUS at 18:53

## 2017-03-14 RX ADMIN — DABIGATRAN ETEXILATE MESYLATE 150 MG: 150 CAPSULE ORAL at 09:01

## 2017-03-14 RX ADMIN — POTASSIUM CHLORIDE AND SODIUM CHLORIDE 100 ML/HR: 900; 150 INJECTION, SOLUTION INTRAVENOUS at 14:31

## 2017-03-14 RX ADMIN — DILTIAZEM HYDROCHLORIDE 360 MG: 180 CAPSULE, COATED, EXTENDED RELEASE ORAL at 11:24

## 2017-03-14 RX ADMIN — VITAMIN D, TAB 1000IU (100/BT) 1000 UNITS: 25 TAB at 09:01

## 2017-03-14 RX ADMIN — DABIGATRAN ETEXILATE MESYLATE 150 MG: 150 CAPSULE ORAL at 20:25

## 2017-03-14 RX ADMIN — METRONIDAZOLE 500 MG: 500 INJECTION, SOLUTION INTRAVENOUS at 00:22

## 2017-03-14 RX ADMIN — BUDESONIDE AND FORMOTEROL FUMARATE DIHYDRATE 2 PUFF: 160; 4.5 AEROSOL RESPIRATORY (INHALATION) at 20:33

## 2017-03-14 RX ADMIN — VANCOMYCIN 500 MG: KIT at 06:54

## 2017-03-14 RX ADMIN — ONDANSETRON 4 MG: 2 INJECTION INTRAMUSCULAR; INTRAVENOUS at 04:13

## 2017-03-14 RX ADMIN — VANCOMYCIN HYDROCHLORIDE 500 MG: 1 INJECTION, POWDER, LYOPHILIZED, FOR SOLUTION INTRAVENOUS at 11:25

## 2017-03-14 RX ADMIN — ATORVASTATIN CALCIUM 40 MG: 40 TABLET, FILM COATED ORAL at 09:01

## 2017-03-14 RX ADMIN — PANTOPRAZOLE SODIUM 40 MG: 40 TABLET, DELAYED RELEASE ORAL at 06:55

## 2017-03-14 RX ADMIN — VANCOMYCIN 500 MG: KIT at 00:22

## 2017-03-14 RX ADMIN — BUDESONIDE AND FORMOTEROL FUMARATE DIHYDRATE 2 PUFF: 160; 4.5 AEROSOL RESPIRATORY (INHALATION) at 08:32

## 2017-03-14 RX ADMIN — VANCOMYCIN 500 MG: KIT at 18:53

## 2017-03-14 RX ADMIN — POTASSIUM CHLORIDE AND SODIUM CHLORIDE 100 ML/HR: 900; 150 INJECTION, SOLUTION INTRAVENOUS at 04:19

## 2017-03-14 RX ADMIN — LOSARTAN POTASSIUM 50 MG: 50 TABLET, FILM COATED ORAL at 20:25

## 2017-03-14 RX ADMIN — LOSARTAN POTASSIUM 50 MG: 50 TABLET, FILM COATED ORAL at 09:01

## 2017-03-14 NOTE — PROGRESS NOTES
"Patient Name: Agnieszka Rizzo  :1930  86 y.o.      Patient Care Team:  Gabe Horne MD as PCP - General (Internal Medicine)  Gabe Horne MD as PCP - Family Medicine  Marcie Robbins MD as Consulting Physician (Cardiology)  Nilesh Danielle MD as Consulting Physician (Urology)    Interval History:   HR still too fast    Subjective:  Following for a fib     Objective   Vital Signs  Temp:  [97.1 °F (36.2 °C)-97.8 °F (36.6 °C)] 97.3 °F (36.3 °C)  Heart Rate:  [] 97  Resp:  [16] 16  BP: (104-127)/(63-77) 119/77    Intake/Output Summary (Last 24 hours) at 17 0808  Last data filed at 17 0419   Gross per 24 hour   Intake   1675 ml   Output      0 ml   Net   1675 ml     Flowsheet Rows         First Filed Value    Admission Height  65\" (165.1 cm) Documented at 2017 1745    Admission Weight  125 lb (56.7 kg) Documented at 2017 1745          Physical Exam:   General Appearance:    Alert, cooperative, in no acute distress   Lungs:     Clear to auscultation.  Normal respiratory effort and rate.      Heart:    Tachy, irregularly irreg.     Chest Wall:    No abnormalities observed   Abdomen:     Soft, nontender, positive bowel sounds.     Extremities:   no cyanosis, clubbing or edema.  No marked joint deformities.  Adequate musculoskeletal strength.       Results Review:      Results from last 7 days  Lab Units 17  0601 17  0540   SODIUM mmol/L  --  133*   POTASSIUM mmol/L  --  3.6   CHLORIDE mmol/L  --  97*   TOTAL CO2 mmol/L  --  23.7   BUN mg/dL  --  22   CREATININE mg/dL 0.46* 0.68   GLUCOSE mg/dL  --  118*   CALCIUM mg/dL  --  8.0*           Results from last 7 days  Lab Units 17  0601   WBC 10*3/mm3 19.13*   HEMOGLOBIN g/dL 9.6*   HEMATOCRIT % 28.0*   PLATELETS 10*3/mm3 396                         Medication Review:     atorvastatin 40 mg Oral Daily   budesonide-formoterol 2 puff Inhalation BID - RT   cholecalciferol 1,000 Units Oral Daily   dabigatran " etexilate 150 mg Oral Q12H   diltiaZEM  mg Oral Q24H   losartan 50 mg Oral Q12H   metroNIDAZOLE 500 mg Intravenous Q8H   pantoprazole 40 mg Oral Q AM   vancomycin 500 mg Oral Q6H          sodium chloride 0.9 % with KCl 20 mEq 100 mL/hr Last Rate: 100 mL/hr (03/14/17 0419)       Assessment/Plan     1. Atrial fibrillation. She failed cardioversion in January 2017. She has a CHADS2-Vasc score of 5.  She is anticoagulated with Pradaxa. She is generally rate controlled. She is not a candidate for beta blockers because of lung disease.  2. Diastolic heart failure.   3. Dyspnea on exertion. Multifactorial  4. Mitral valve prolapse with very mild mitral regurgitation.  5. Mild tricuspid regurgitation without pulmonary hypertension.  6. Nonobstructive coronary artery disease diagnosed by catheter in 2007. She has noted coronary artery calcification on CT scans. Nuclear stress 12/16 was normal.   7. Hypertension. Her blood pressure is controlled.  8. Hyperlipidemia. Zetia and atorvastatin  9. Hyponatremia. Stable.  10. C. difficile diarrhea.    - increase diltiazem to 360mg QD.     Marcie Robbins MD, [facility]  03/14/17  8:08 AM

## 2017-03-14 NOTE — PROGRESS NOTES
LOS: 3 days     Name: Agnieszka Rizzo  Age/Sex: 86 y.o. female  :  1930        PCP: Gabe Horne MD    Subjective   Still with poor appetite and multiple diarrhea stools today still with cramping and pain at times maybe a little better today  General: No Fever or Chills, Cardiac: No Chest Pain or Palpitations, Resp: No Cough or SOA and Other: No bleeding      atorvastatin 40 mg Oral Daily   budesonide-formoterol 2 puff Inhalation BID - RT   cholecalciferol 1,000 Units Oral Daily   dabigatran etexilate 150 mg Oral Q12H   diltiaZEM  mg Oral Q24H   losartan 50 mg Oral Q12H   metroNIDAZOLE 500 mg Intravenous Q8H   pantoprazole 40 mg Oral Q AM   vancomycin 500 mg Oral Q6H       sodium chloride 0.9 % with KCl 20 mEq 100 mL/hr Last Rate: 100 mL/hr (17)       Objective   Vital Signs  Temp:  [97.1 °F (36.2 °C)-97.8 °F (36.6 °C)] 97.3 °F (36.3 °C)  Heart Rate:  [] 97  Resp:  [16] 16  BP: (104-127)/(63-77) 119/77  Body mass index is 21.27 kg/(m^2).    Intake/Output Summary (Last 24 hours) at 17  Last data filed at 17   Gross per 24 hour   Intake   1675 ml   Output      0 ml   Net   1675 ml       General:  Resting comfortably no active distress  Eyes:  PERRL; no conj pallor; EOMI; no scleral icterus  ENT:  oral mucosa moist; pharynx w/o exudate; ext inspect WNL  Neck: nl movement; no JVD; no adenopathy; thyroid WNL  Lungs:  CTA; good air entry; Non labored; No wheeze, No Crackle, No Rhonchi  Cardiac:  RRR; no murmurs;  nl S1/S2  Abd:  soft; mil global tenderness; non distended; no HSM; no masses; BS +  : deferred  Musc/Skel: no arthropathy, or deformity   Skin:  warm and perfused; no apparent rash on examined areas  Neuro: CN grossly intact; no focal deficit of strength or sensory   Ext/P Vasc:  peripheral pulses 2+; no clubbing ; no cyanosis; no edema  MS & Psychiatric: A&O x  3; memory intact; affect/mood appropriate    Results Review:       I reviewed the  patient's new clinical results.    Results from last 7 days  Lab Units 03/14/17  0601 03/13/17  0540 03/12/17  0642 03/11/17  1808   WBC 10*3/mm3 19.13* 24.20* 25.83* 18.40*   HEMOGLOBIN g/dL 9.6* 9.7* 10.2* 10.3*   PLATELETS 10*3/mm3 396 379 361 367       Results from last 7 days  Lab Units 03/14/17  0601 03/13/17  0540 03/12/17  0642 03/11/17  1808   SODIUM mmol/L  --  133* 131* 129*   POTASSIUM mmol/L  --  3.6 3.3* 3.7   CHLORIDE mmol/L  --  97* 92* 91*   TOTAL CO2 mmol/L  --  23.7 25.1 23.6   BUN mg/dL  --  22 10 8   CREATININE mg/dL 0.46* 0.68 0.51* 0.53*   CALCIUM mg/dL  --  8.0* 8.4* 8.4*   Estimated Creatinine Clearance: 45.4 mL/min (by C-G formula based on Cr of 0.46).      Assessment/Plan   Principal Problem:    C. difficile diarrhea  Active Problems:    Benign essential hypertension    Chronic coronary artery disease    Chronic atrial fibrillation    Hyponatremia    DNR (do not resuscitate)    Sepsis    Chronic diastolic CHF (congestive heart failure)      PLAN  - continue antibiotics with the addition of vanc retention enemas added by ID  - HR improved and oral diltiazem increased today montior BP with ongoing diarrhea  - monitor hemoglobin no bleeding noted likely dilutional  - Na will continue to improve with treatment of underlying condition    Disposition        Lon Chilel MD  [facility]  03/14/17  8:18 AM      EMR Dragon/Transcription disclaimer:     Much of this encounter note is an electronic transcription/translation of spoken language to printed text. The electronic translation of spoken language may permit erroneous, or at times, nonsensical words or phrases to be inadvertently transcribed; Although I have reviewed the note for such errors, some may still exist.

## 2017-03-14 NOTE — PLAN OF CARE
Problem: Patient Care Overview (Adult)  Goal: Plan of Care Review  Outcome: Ongoing (interventions implemented as appropriate)    03/14/17 0555   Coping/Psychosocial Response Interventions   Plan Of Care Reviewed With patient   Patient Care Overview   Progress no change   Outcome Evaluation   Outcome Summary/Follow up Plan stooling has decreased in amount and how often, still not able to collect urine sample, nausea medications given 1x this shift, heart rhythm remains afib with rate under 100 unless moving around, 2L O2, bedside commode, GI Soft diet       Goal: Adult Individualization and Mutuality  Outcome: Ongoing (interventions implemented as appropriate)    Problem: Diarrhea (Adult)  Goal: Improved/Reduced Symptoms  Outcome: Ongoing (interventions implemented as appropriate)    Problem: Fall Risk (Adult)  Goal: Absence of Falls  Outcome: Ongoing (interventions implemented as appropriate)

## 2017-03-14 NOTE — SIGNIFICANT NOTE
03/14/17 0822   Rehab Treatment   Discipline physical therapy assistant   Treatment Not Performed patient/family declined treatment  (Pt wants PM treatment)   Recommendation   PT - Next Appointment 03/14/17

## 2017-03-14 NOTE — PLAN OF CARE
Problem: Patient Care Overview (Adult)  Goal: Plan of Care Review  Outcome: Ongoing (interventions implemented as appropriate)    03/14/17 1711   Coping/Psychosocial Response Interventions   Plan Of Care Reviewed With patient   Patient Care Overview   Progress improving   Outcome Evaluation   Outcome Summary/Follow up Plan continues to have multiple small stools, tolerated antibiotic enema well earlier this shift, unable to collect urine spec. due to having stool in the bsc every time she urinates, remained in a-fib throughout the shift, 02 on at 2 liters          Problem: Diarrhea (Adult)  Goal: Improved/Reduced Symptoms  Outcome: Ongoing (interventions implemented as appropriate)    Problem: Fall Risk (Adult)  Goal: Absence of Falls  Outcome: Ongoing (interventions implemented as appropriate)

## 2017-03-14 NOTE — SIGNIFICANT NOTE
03/14/17 1322   Rehab Treatment   Discipline physical therapy assistant   Treatment Not Performed patient/family decline treatment, pt not feeling well  (Pt on the commode abdiel gcleaned up pt states that she wants therapy to come back tomorrow afternoon due to enema)   Recommendation   PT - Next Appointment 03/15/17

## 2017-03-15 LAB
ALBUMIN SERPL-MCNC: 2.5 G/DL (ref 3.5–5.2)
ANION GAP SERPL CALCULATED.3IONS-SCNC: 10.6 MMOL/L
BUN BLD-MCNC: 11 MG/DL (ref 8–23)
BUN/CREAT SERPL: 24.4 (ref 7–25)
CALCIUM SPEC-SCNC: 8.5 MG/DL (ref 8.6–10.5)
CHLORIDE SERPL-SCNC: 103 MMOL/L (ref 98–107)
CO2 SERPL-SCNC: 22.4 MMOL/L (ref 22–29)
CREAT BLD-MCNC: 0.45 MG/DL (ref 0.57–1)
DEPRECATED RDW RBC AUTO: 55.4 FL (ref 37–54)
ERYTHROCYTE [DISTWIDTH] IN BLOOD BY AUTOMATED COUNT: 16 % (ref 11.7–13)
GFR SERPL CREATININE-BSD FRML MDRD: 132 ML/MIN/1.73
GLUCOSE BLD-MCNC: 99 MG/DL (ref 65–99)
HCT VFR BLD AUTO: 30.5 % (ref 35.6–45.5)
HGB BLD-MCNC: 9.8 G/DL (ref 11.9–15.5)
MCH RBC QN AUTO: 30.5 PG (ref 26.9–32)
MCHC RBC AUTO-ENTMCNC: 32.1 G/DL (ref 32.4–36.3)
MCV RBC AUTO: 95 FL (ref 80.5–98.2)
PHOSPHATE SERPL-MCNC: 1.7 MG/DL (ref 2.5–4.5)
PLATELET # BLD AUTO: 464 10*3/MM3 (ref 140–500)
PMV BLD AUTO: 9.9 FL (ref 6–12)
POTASSIUM BLD-SCNC: 4.1 MMOL/L (ref 3.5–5.2)
RBC # BLD AUTO: 3.21 10*6/MM3 (ref 3.9–5.2)
SODIUM BLD-SCNC: 136 MMOL/L (ref 136–145)
WBC NRBC COR # BLD: 14.69 10*3/MM3 (ref 4.5–10.7)

## 2017-03-15 PROCEDURE — 80069 RENAL FUNCTION PANEL: CPT | Performed by: HOSPITALIST

## 2017-03-15 PROCEDURE — 99232 SBSQ HOSP IP/OBS MODERATE 35: CPT | Performed by: INTERNAL MEDICINE

## 2017-03-15 PROCEDURE — 85027 COMPLETE CBC AUTOMATED: CPT | Performed by: INTERNAL MEDICINE

## 2017-03-15 PROCEDURE — 25010000002 FUROSEMIDE PER 20 MG: Performed by: INTERNAL MEDICINE

## 2017-03-15 PROCEDURE — 97110 THERAPEUTIC EXERCISES: CPT

## 2017-03-15 PROCEDURE — 25810000003 SODIUM CHLORIDE 0.9 % WITH KCL 20 MEQ 20-0.9 MEQ/L-% SOLUTION: Performed by: HOSPITALIST

## 2017-03-15 PROCEDURE — 94799 UNLISTED PULMONARY SVC/PX: CPT

## 2017-03-15 RX ORDER — FUROSEMIDE 10 MG/ML
20 INJECTION INTRAMUSCULAR; INTRAVENOUS ONCE
Status: COMPLETED | OUTPATIENT
Start: 2017-03-15 | End: 2017-03-15

## 2017-03-15 RX ADMIN — BUDESONIDE AND FORMOTEROL FUMARATE DIHYDRATE 2 PUFF: 160; 4.5 AEROSOL RESPIRATORY (INHALATION) at 20:43

## 2017-03-15 RX ADMIN — VANCOMYCIN 500 MG: KIT at 00:31

## 2017-03-15 RX ADMIN — VANCOMYCIN 500 MG: KIT at 06:09

## 2017-03-15 RX ADMIN — PANTOPRAZOLE SODIUM 40 MG: 40 TABLET, DELAYED RELEASE ORAL at 06:17

## 2017-03-15 RX ADMIN — FUROSEMIDE 20 MG: 10 INJECTION, SOLUTION INTRAMUSCULAR; INTRAVENOUS at 11:33

## 2017-03-15 RX ADMIN — BUDESONIDE AND FORMOTEROL FUMARATE DIHYDRATE 2 PUFF: 160; 4.5 AEROSOL RESPIRATORY (INHALATION) at 07:51

## 2017-03-15 RX ADMIN — DABIGATRAN ETEXILATE MESYLATE 150 MG: 150 CAPSULE ORAL at 08:39

## 2017-03-15 RX ADMIN — VANCOMYCIN 500 MG: KIT at 18:32

## 2017-03-15 RX ADMIN — ATORVASTATIN CALCIUM 40 MG: 40 TABLET, FILM COATED ORAL at 08:39

## 2017-03-15 RX ADMIN — VANCOMYCIN 500 MG: KIT at 13:52

## 2017-03-15 RX ADMIN — DILTIAZEM HYDROCHLORIDE 360 MG: 180 CAPSULE, COATED, EXTENDED RELEASE ORAL at 08:38

## 2017-03-15 RX ADMIN — POTASSIUM CHLORIDE AND SODIUM CHLORIDE 75 ML/HR: 900; 150 INJECTION, SOLUTION INTRAVENOUS at 04:16

## 2017-03-15 RX ADMIN — VITAMIN D, TAB 1000IU (100/BT) 1000 UNITS: 25 TAB at 08:39

## 2017-03-15 RX ADMIN — VANCOMYCIN 500 MG: KIT at 23:26

## 2017-03-15 RX ADMIN — LOSARTAN POTASSIUM 50 MG: 50 TABLET, FILM COATED ORAL at 21:26

## 2017-03-15 RX ADMIN — LOSARTAN POTASSIUM 50 MG: 50 TABLET, FILM COATED ORAL at 08:39

## 2017-03-15 RX ADMIN — DABIGATRAN ETEXILATE MESYLATE 150 MG: 150 CAPSULE ORAL at 21:26

## 2017-03-15 NOTE — PLAN OF CARE
Problem: Patient Care Overview (Adult)  Goal: Plan of Care Review  Outcome: Ongoing (interventions implemented as appropriate)    03/14/17 1711 03/15/17 0321 03/15/17 1714   Coping/Psychosocial Response Interventions   Plan Of Care Reviewed With --  patient --    Patient Care Overview   Progress improving --  --    Outcome Evaluation   Outcome Summary/Follow up Plan --  --  pt denies pain, denies nausea, tolerating meals, vanc given, fluids d/c'd and lasix given . pt stable        Goal: Adult Individualization and Mutuality  Outcome: Ongoing (interventions implemented as appropriate)    Problem: Diarrhea (Adult)  Goal: Improved/Reduced Symptoms  Outcome: Ongoing (interventions implemented as appropriate)    Problem: Fall Risk (Adult)  Goal: Absence of Falls  Outcome: Ongoing (interventions implemented as appropriate)

## 2017-03-15 NOTE — PLAN OF CARE
Problem: Patient Care Overview (Adult)  Goal: Plan of Care Review  Outcome: Ongoing (interventions implemented as appropriate)    03/15/17 0321   Coping/Psychosocial Response Interventions   Plan Of Care Reviewed With patient   Outcome Evaluation   Outcome Summary/Follow up Plan Patient still having frequent loose stools . Meds given as ordered. Patient refused remaining doses of Vanco (unable to tolerate ) enema. Still unable to collect urine specimen because ieveytime she urinate it is mixed with stools. Fall precautons enforced. Monitored.       Goal: Discharge Needs Assessment  Outcome: Ongoing (interventions implemented as appropriate)    Problem: Diarrhea (Adult)  Goal: Improved/Reduced Symptoms  Outcome: Ongoing (interventions implemented as appropriate)    Problem: Fall Risk (Adult)  Goal: Absence of Falls  Outcome: Ongoing (interventions implemented as appropriate)

## 2017-03-15 NOTE — PROGRESS NOTES
" LOS: 4 days     Chief Complaint:  Follow-up C diff    Interval History:  No acute events. BM frequency is improved. Consistency has thickened. She was able to tolerate 2 enemas yesterday. \"I finally feel like I'm turning the corner,\" she says. Her abdomen remains a bit distended but she denies pain. Her appetite is improving too.    ROS: no rash, no vomiting, no SOA    Vital Signs  Temp:  [97.1 °F (36.2 °C)-97.5 °F (36.4 °C)] 97.3 °F (36.3 °C)  Heart Rate:  [84-99] 99  Resp:  [16-18] 16  BP: ()/(54-79) 113/75    Physical Exam:  General: awake, alert  Head: Normocephalic  Eyes: no scleral icterus  ENT: MMM, OP clear, no thrush. Fair dentition.   Neck: Supple  Cardiovascular: NR w/ irreg irreg rhythm, no murmurs, rubs, or gallops; 1+ pitting LE edema  Respiratory:  normal work of breathing  GI: Abdomen is moderately firm and distended, distant bowel sounds in all four quadrants  : no Long catheter present  Musculoskeletal: no joint abnormalities, normal musculature  Skin: No rashes, lesions, or embolic phenomenon  Neurological: Alert and oriented x 3,  motor strength 5/5 in all four extremities  Psychiatric: Normal mood and affect   Vasc: no cyanosis; PIV w/o erythema    Antibiotics:  •  vancomycin oral solution 500 mg, 500 mg, Oral, Q6H, Hayden Landry MD, 500 mg at 03/13/17 0620    LABS:  CBC, Crt, micro reviewed today  Lab Results   Component Value Date    WBC 14.69 (H) 03/15/2017    HGB 9.8 (L) 03/15/2017    HCT 30.5 (L) 03/15/2017    MCV 95.0 03/15/2017     03/15/2017     Lab Results   Component Value Date    CREATININE 0.45 (L) 03/15/2017     3/11 C diff +  LA 1     Microbiology:  3/11 BCx: NGTD     Radiology (personally reviewed images/report):  No new imaging    Assessment/Plan   1. Severe recurrent C difficile  -symptoms finally improving  -continue vancomycin to 500 mg PO q6h; plan 14 day course through 3/25/17  -DC vanco enemas  -no probiotics until after treatment     2. 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 Hyponatremia  4. Afib with RVR - cardiology following. Remains on diltiazem.  5. LE edema -  Agree w/ stopping IVFs per cardiology note     Thank you for this consult. ID will follow.

## 2017-03-15 NOTE — PROGRESS NOTES
Acute Care - Physical Therapy Treatment Note  [facility]     Patient Name: Agnieszka Rizzo  : 1930  MRN: [id number]  Today's Date: 3/15/2017     Date of Referral to PT: 17       Admit Date: 3/11/2017    Visit Dx:    ICD-10-CM ICD-9-CM   1. C. difficile colitis A04.7 008.45   2. Atrial fibrillation with RVR I48.91 427.31   3. Weakness generalized R53.1 780.79     Patient Active Problem List   Diagnosis   • Pneumothorax of right lung after biopsy   • Pulmonary aspergillosis   • Benign essential hypertension   • Chronic coronary artery disease   • Hyperlipidemia   • Mitral valve insufficiency   • Ventricular premature beats   • Ventricular tachycardia   • Chronic atrial fibrillation   • Pneumonia   • Pneumonia with the fungal infection aspergillosis   • Acute respiratory failure with hypoxia   • Acid-fast bacteria present   • Hyponatremia   • C. difficile diarrhea   • DNR (do not resuscitate)   • Sepsis   • Chronic diastolic CHF (congestive heart failure)               Adult Rehabilitation Note       03/15/17 1326          Rehab Assessment/Intervention    Discipline physical therapist  -EM      Document Type therapy note (daily note)  -EM      Subjective Information agree to therapy;complains of;weakness  -EM      Recorded by [EM] Yesenia Larsen, PT      Pain Assessment    Pain Assessment No/denies pain  -EM      Recorded by [EM] Yesenia Larsen, PT      Bed Mobility, Assessment/Treatment    Bed Mob, Supine to Sit, Stokes supervision required  -EM      Recorded by [EM] Yesenia Larsen, PT      Transfer Assessment/Treatment    Transfers, Bed-Chair Stokes supervision required   tsf from bed to BSC   -EM      Transfers, Sit-Stand Stokes supervision required  -EM      Recorded by [EM] Yesenia Larsen, PT      Gait Assessment/Treatment    Gait, Stokes Level contact guard assist  -EM      Gait, Assistive Device rolling walker  -EM      Gait, Distance (Feet) 100  -EM       Gait, Gait Deviations cecilia decreased  -EM      Gait, Comment attemped to dario shoes for more support but feet too swollen, used slipper socks   -EM      Recorded by [EM] Yesenia Larsen, PT      Positioning and Restraints    Pre-Treatment Position in bed  -EM      Post Treatment Position bed  -EM      In Bed supine;call light within reach  -EM      Recorded by [EM] Yesneia Larsen, PT        User Key  (r) = Recorded By, (t) = Taken By, (c) = Cosigned By    Initials Name Effective Dates    EM Yesenia Larsen, PT 12/01/15 -                 IP PT Goals       03/12/17 1615          Bed Mobility PT LTG    Bed Mobility PT LTG, Date Established 03/12/17  -AL      Bed Mobility PT LTG, Time to Achieve 1 wk  -AL      Bed Mobility PT LTG, Activity Type all bed mobility  -AL      Bed Mobility PT LTG, [place] Level independent  -AL      Transfer Training PT LTG    Transfer Training PT LTG, Date Established 03/12/17  -AL      Transfer Training PT LTG, Time to Achieve 1 wk  -AL      Transfer Training PT LTG, Activity Type sit to stand/stand to sit  -AL      Transfer Training PT LTG, [place] Level conditional independence  -AL      Transfer Training PT LTG, Assist Device walker, rolling  -AL      Gait Training PT LTG    Gait Training Goal PT LTG, Date Established 03/12/17  -AL      Gait Training Goal PT LTG, Time to Achieve 1 wk  -AL      Gait Training Goal PT LTG, [place] Level supervision required  -AL      Gait Training Goal PT LTG, Assist Device walker, rolling  -AL      Gait Training Goal PT LTG, Distance to Achieve 120  -AL        User Key  (r) = Recorded By, (t) = Taken By, (c) = Cosigned By    Initials Name Provider Type    ANDERSON Khan PT Physical Therapist          Physical Therapy Education     Title: PT OT SLP Therapies (Active)     Topic: Physical Therapy (Active)     Point: Mobility training (Done)    Learning Progress Summary    Learner Readiness Method Response Comment Documented  by Status   Patient Acceptance E VU  EM 03/15/17 1328 Done    Acceptance E VU  AL 03/12/17 1613 Done               Point: Body mechanics (Done)    Learning Progress Summary    Learner Readiness Method Response Comment Documented by Status   Patient Acceptance E VU  AL 03/12/17 1613 Done               Point: Precautions (Done)    Learning Progress Summary    Learner Readiness Method Response Comment Documented by Status   Patient Acceptance E VU  AL 03/12/17 1613 Done                      User Key     Initials Effective Dates Name Provider Type Discipline    AL 12/01/15 -  Sunitha Khan, PT Physical Therapist PT     12/01/15 -  Yesenia Larsen, PT Physical Therapist PT                    PT Recommendation and Plan  Anticipated Equipment Needs At Discharge: front wheeled walker  Anticipated Discharge Disposition: home with home health, inpatient rehabilitation facility  Demonstrates Need for Referral to Another Service:  (has been to [facility]; but really wants to go home with OPPT.)  PT Frequency: daily             Outcome Measures       03/15/17 1300 03/12/17 1600       How much help from another person do you currently need...    Turning from your back to your side while in flat bed without using bedrails? 3  -EM 3  -AL     Moving from lying on back to sitting on the side of a flat bed without bedrails? 3  -EM 3  -AL     Moving to and from a bed to a chair (including a wheelchair)? 3  -EM 3  -AL     Standing up from a chair using your arms (e.g., wheelchair, bedside chair)? 3  -EM 3  -AL     Climbing 3-5 steps with a railing? 2  -EM 2  -AL     To walk in hospital room? 3  -EM 3  -AL     AM-PAC 6 Clicks Score 17  -EM 17  -AL     Functional Assessment    Outcome Measure Options  AM-PAC 6 Clicks Basic Mobility (PT)  -AL       User Key  (r) = Recorded By, (t) = Taken By, (c) = Cosigned By    Initials Name Provider Type    ANDERSON Khan, PT Physical Therapist    EM Yesenia Larsen, PT Physical Therapist            Time Calculation:         PT Charges       03/15/17 1334 03/15/17 1333       Time Calculation    Start Time  1320  -EM     Stop Time 1336  -EM      PT Received On  03/15/17  -EM     PT - Next Appointment  03/16/17  -EM       User Key  (r) = Recorded By, (t) = Taken By, (c) = Cosigned By    Initials Name Provider Type    EM Yesenia Larsen, PT Physical Therapist          Therapy Charges for Today     Code Description Service Date Service Provider Modifiers Qty    39468569204 HC PT THER PROC EA 15 MIN 3/15/2017 Yesenia Larsen, PT GP 1          PT G-Codes  Outcome Measure Options: AM-PAC 6 Clicks Basic Mobility (PT)    Yesenia Larsen PT  3/15/2017

## 2017-03-15 NOTE — PROGRESS NOTES
"Patient Name: Agnieszka Rizzo  :1930  86 y.o.      Patient Care Team:  Gabe Horne MD as PCP - General (Internal Medicine)  Gabe Horne MD as PCP - Family Medicine  Marcie Robbins MD as Consulting Physician (Cardiology)  Nilesh Danielle MD as Consulting Physician (Urology)    Interval History:   Increased diltiazem    Subjective:  Following for a fib    Objective   Vital Signs  Temp:  [97.1 °F (36.2 °C)-97.5 °F (36.4 °C)] 97.3 °F (36.3 °C)  Heart Rate:  [84-99] 99  Resp:  [16-18] 16  BP: ()/(54-79) 113/75    Intake/Output Summary (Last 24 hours) at 03/15/17 0910  Last data filed at 03/15/17 0636   Gross per 24 hour   Intake   1350 ml   Output    150 ml   Net   1200 ml     Flowsheet Rows         First Filed Value    Admission Height  65\" (165.1 cm) Documented at 2017 1745    Admission Weight  125 lb (56.7 kg) Documented at 2017 1745          Physical Exam:   General Appearance:    Alert, cooperative, in no acute distress   Lungs:     Clear to auscultation.  Normal respiratory effort and rate.      Heart:    Irreg, irreg. HR controlled     Chest Wall:    No abnormalities observed   Abdomen:     Soft, nontender, positive bowel sounds.     Extremities:   Mild nonpitting edema      Results Review:      Results from last 7 days  Lab Units 03/15/17  0629   SODIUM mmol/L 136   POTASSIUM mmol/L 4.1   CHLORIDE mmol/L 103   TOTAL CO2 mmol/L 22.4   BUN mg/dL 11   CREATININE mg/dL 0.45*   GLUCOSE mg/dL 99   CALCIUM mg/dL 8.5*           Results from last 7 days  Lab Units 03/15/17  0629   WBC 10*3/mm3 14.69*   HEMOGLOBIN g/dL 9.8*   HEMATOCRIT % 30.5*   PLATELETS 10*3/mm3 464                         Medication Review:     atorvastatin 40 mg Oral Daily   budesonide-formoterol 2 puff Inhalation BID - RT   cholecalciferol 1,000 Units Oral Daily   dabigatran etexilate 150 mg Oral Q12H   diltiaZEM  mg Oral Q24H   losartan 50 mg Oral Q12H   pantoprazole 40 mg Oral Q AM   vancomycin " (VANCOCIN) rectal enema 500 mg Rectal Q6H   vancomycin 500 mg Oral Q6H          sodium chloride 0.9 % with KCl 20 mEq 75 mL/hr Last Rate: 75 mL/hr (03/15/17 0416)       Assessment/Plan     1. Atrial fibrillation. She failed cardioversion in January 2017. She has a CHADS2-Vasc score of 5. She is anticoagulated with Pradaxa. She is generally rate controlled. She is not a candidate for beta blockers because of lung disease.  2. Diastolic heart failure.   3. Dyspnea on exertion. Multifactorial  4. Mitral valve prolapse with very mild mitral regurgitation.  5. Mild tricuspid regurgitation without pulmonary hypertension.  6. Nonobstructive coronary artery disease diagnosed by catheter in 2007. She has noted coronary artery calcification on CT scans. Nuclear stress 12/16 was normal.   7. Hypertension. Her blood pressure is controlled.  8. Hyperlipidemia. Zetia and atorvastatin  9. Hyponatremia. Stable.  10. C. difficile diarrhea.     - Heart rate is better on his current dose of dilt. Continue same.    - DC IVF  - One dose of lasix  - Follow up with me at scheduled appt in April     Marcie Robbins MD, [facility]  03/15/17  9:10 AM

## 2017-03-15 NOTE — PROGRESS NOTES
LOS: 4 days     Name: Agnieszka Rizzo  Age/Sex: 86 y.o. female  :  1930        PCP: Gabe Horne MD    Subjective   Cramping is improving diarrhea has slowed still with a poor appetite but it is better than the day before.  No fevers or chills overnight  General: No Fever or Chills, Cardiac: No Chest Pain or Palpitations, Resp: No Cough or SOA and Other: No bleeding      atorvastatin 40 mg Oral Daily   budesonide-formoterol 2 puff Inhalation BID - RT   cholecalciferol 1,000 Units Oral Daily   dabigatran etexilate 150 mg Oral Q12H   diltiaZEM  mg Oral Q24H   losartan 50 mg Oral Q12H   pantoprazole 40 mg Oral Q AM   vancomycin (VANCOCIN) rectal enema 500 mg Rectal Q6H   vancomycin 500 mg Oral Q6H       sodium chloride 0.9 % with KCl 20 mEq 75 mL/hr Last Rate: 75 mL/hr (03/15/17 0416)       Objective   Vital Signs  Temp:  [97.1 °F (36.2 °C)-97.5 °F (36.4 °C)] 97.3 °F (36.3 °C)  Heart Rate:  [84-99] 99  Resp:  [16-18] 16  BP: ()/(54-79) 113/75  Body mass index is 21.27 kg/(m^2).    Intake/Output Summary (Last 24 hours) at 03/15/17 0906  Last data filed at 03/15/17 0636   Gross per 24 hour   Intake   1350 ml   Output    150 ml   Net   1200 ml       General:  Resting comfortably no active distress  Eyes:  PERRL; no conj pallor; EOMI; no scleral icterus  ENT:  oral mucosa moist; pharynx w/o exudate; ext inspect WNL  Neck: nl movement; no JVD; no adenopathy; thyroid WNL  Lungs:  CTA; good air entry; Non labored; No wheeze, No Crackle, No Rhonchi  Cardiac:  RRR; no murmurs;  nl S1/S2  Abd:  soft; mil global tenderness; non distended; no HSM; no masses; BS +  : deferred  Musc/Skel: no arthropathy, or deformity   Skin:  warm and perfused; no apparent rash on examined areas  Neuro: CN grossly intact; no focal deficit of strength or sensory   Ext/P Vasc:  peripheral pulses 2+; no clubbing ; no cyanosis; no edema  MS & Psychiatric: A&O x  3; memory intact; affect/mood appropriate    Results  Review:       I reviewed the patient's new clinical results.    Results from last 7 days  Lab Units 03/15/17  0629 03/14/17  0601 03/13/17  0540 03/12/17  0642 03/11/17  1808   WBC 10*3/mm3 14.69* 19.13* 24.20* 25.83* 18.40*   HEMOGLOBIN g/dL 9.8* 9.6* 9.7* 10.2* 10.3*   PLATELETS 10*3/mm3 464 396 379 361 367       Results from last 7 days  Lab Units 03/15/17  0629 03/14/17  0601 03/13/17  0540 03/12/17  0642 03/11/17  1808   SODIUM mmol/L 136  --  133* 131* 129*   POTASSIUM mmol/L 4.1  --  3.6 3.3* 3.7   CHLORIDE mmol/L 103  --  97* 92* 91*   TOTAL CO2 mmol/L 22.4  --  23.7 25.1 23.6   BUN mg/dL 11  --  22 10 8   CREATININE mg/dL 0.45* 0.46* 0.68 0.51* 0.53*   CALCIUM mg/dL 8.5*  --  8.0* 8.4* 8.4*   PHOSPHORUS mg/dL 1.7*  --   --   --   --    Estimated Creatinine Clearance: 45.4 mL/min (by C-G formula based on Cr of 0.45).      Assessment/Plan   Principal Problem:    C. difficile diarrhea  Active Problems:    Benign essential hypertension    Chronic coronary artery disease    Chronic atrial fibrillation    Hyponatremia    DNR (do not resuscitate)    Sepsis    Chronic diastolic CHF (congestive heart failure)      PLAN  - Showing signs of clinical improvement today white blood cell count improving diarrhea slowing.  Abdominal cramping is improved today as well.  - HR improved and oral diltiazem tolerated continue to follow  - monitor hemoglobin no bleeding noted likely dilutional, remained stable  - Na will continue to improve with treatment of underlying condition    Disposition  Probably will plan for discharge if she continues to clinically improve would be Friday.      Lon Chilel MD  [facility]  03/15/17  9:06 AM      EMR Dragon/Transcription disclaimer:     Much of this encounter note is an electronic transcription/translation of spoken language to printed text. The electronic translation of spoken language may permit erroneous, or at times, nonsensical words or phrases to be  inadvertently transcribed; Although I have reviewed the note for such errors, some may still exist.

## 2017-03-16 LAB
ANION GAP SERPL CALCULATED.3IONS-SCNC: 9.8 MMOL/L
BACTERIA SPEC AEROBE CULT: NORMAL
BACTERIA SPEC AEROBE CULT: NORMAL
BUN BLD-MCNC: 7 MG/DL (ref 8–23)
BUN/CREAT SERPL: 16.7 (ref 7–25)
CALCIUM SPEC-SCNC: 8 MG/DL (ref 8.6–10.5)
CHLORIDE SERPL-SCNC: 100 MMOL/L (ref 98–107)
CO2 SERPL-SCNC: 24.2 MMOL/L (ref 22–29)
CREAT BLD-MCNC: 0.42 MG/DL (ref 0.57–1)
DEPRECATED RDW RBC AUTO: 54.2 FL (ref 37–54)
ERYTHROCYTE [DISTWIDTH] IN BLOOD BY AUTOMATED COUNT: 15.9 % (ref 11.7–13)
GFR SERPL CREATININE-BSD FRML MDRD: 143 ML/MIN/1.73
GLUCOSE BLD-MCNC: 101 MG/DL (ref 65–99)
HCT VFR BLD AUTO: 28.4 % (ref 35.6–45.5)
HGB BLD-MCNC: 9.3 G/DL (ref 11.9–15.5)
MCH RBC QN AUTO: 30.7 PG (ref 26.9–32)
MCHC RBC AUTO-ENTMCNC: 32.7 G/DL (ref 32.4–36.3)
MCV RBC AUTO: 93.7 FL (ref 80.5–98.2)
PLATELET # BLD AUTO: 426 10*3/MM3 (ref 140–500)
PMV BLD AUTO: 9.7 FL (ref 6–12)
POTASSIUM BLD-SCNC: 3.7 MMOL/L (ref 3.5–5.2)
RBC # BLD AUTO: 3.03 10*6/MM3 (ref 3.9–5.2)
SODIUM BLD-SCNC: 134 MMOL/L (ref 136–145)
WBC NRBC COR # BLD: 13.75 10*3/MM3 (ref 4.5–10.7)

## 2017-03-16 PROCEDURE — 80048 BASIC METABOLIC PNL TOTAL CA: CPT | Performed by: HOSPITALIST

## 2017-03-16 PROCEDURE — 85027 COMPLETE CBC AUTOMATED: CPT | Performed by: INTERNAL MEDICINE

## 2017-03-16 PROCEDURE — 99232 SBSQ HOSP IP/OBS MODERATE 35: CPT | Performed by: INTERNAL MEDICINE

## 2017-03-16 PROCEDURE — 94799 UNLISTED PULMONARY SVC/PX: CPT

## 2017-03-16 PROCEDURE — 97110 THERAPEUTIC EXERCISES: CPT

## 2017-03-16 RX ADMIN — VANCOMYCIN 500 MG: KIT at 18:52

## 2017-03-16 RX ADMIN — PANTOPRAZOLE SODIUM 40 MG: 40 TABLET, DELAYED RELEASE ORAL at 05:14

## 2017-03-16 RX ADMIN — VANCOMYCIN 500 MG: KIT at 12:58

## 2017-03-16 RX ADMIN — DABIGATRAN ETEXILATE MESYLATE 150 MG: 150 CAPSULE ORAL at 08:33

## 2017-03-16 RX ADMIN — DILTIAZEM HYDROCHLORIDE 360 MG: 180 CAPSULE, COATED, EXTENDED RELEASE ORAL at 08:33

## 2017-03-16 RX ADMIN — BUDESONIDE AND FORMOTEROL FUMARATE DIHYDRATE 2 PUFF: 160; 4.5 AEROSOL RESPIRATORY (INHALATION) at 21:40

## 2017-03-16 RX ADMIN — BUDESONIDE AND FORMOTEROL FUMARATE DIHYDRATE 2 PUFF: 160; 4.5 AEROSOL RESPIRATORY (INHALATION) at 08:07

## 2017-03-16 RX ADMIN — LOSARTAN POTASSIUM 50 MG: 50 TABLET, FILM COATED ORAL at 22:18

## 2017-03-16 RX ADMIN — LOSARTAN POTASSIUM 50 MG: 50 TABLET, FILM COATED ORAL at 08:33

## 2017-03-16 RX ADMIN — VANCOMYCIN 500 MG: KIT at 05:09

## 2017-03-16 RX ADMIN — VITAMIN D, TAB 1000IU (100/BT) 1000 UNITS: 25 TAB at 08:33

## 2017-03-16 RX ADMIN — DABIGATRAN ETEXILATE MESYLATE 150 MG: 150 CAPSULE ORAL at 22:18

## 2017-03-16 RX ADMIN — ATORVASTATIN CALCIUM 40 MG: 40 TABLET, FILM COATED ORAL at 08:33

## 2017-03-16 NOTE — PLAN OF CARE
Problem: Patient Care Overview (Adult)  Goal: Plan of Care Review  Outcome: Ongoing (interventions implemented as appropriate)    03/16/17 1406   Coping/Psychosocial Response Interventions   Plan Of Care Reviewed With patient   Patient Care Overview   Progress progress toward functional goals as expected   Outcome Evaluation   Outcome Summary/Follow up Plan Pt is increasing with amb safety and distance with use of RWX and CGA

## 2017-03-16 NOTE — PROGRESS NOTES
Acute Care - Physical Therapy Treatment Note  [facility]     Patient Name: Agneiszka Rizzo  : 1930  MRN: [id number]  Today's Date: 3/16/2017     Date of Referral to PT: 17       Admit Date: 3/11/2017    Visit Dx:    ICD-10-CM ICD-9-CM   1. C. difficile colitis A04.7 008.45   2. Atrial fibrillation with RVR I48.91 427.31   3. Weakness generalized R53.1 780.79     Patient Active Problem List   Diagnosis   • Pneumothorax of right lung after biopsy   • Pulmonary aspergillosis   • Benign essential hypertension   • Chronic coronary artery disease   • Hyperlipidemia   • Mitral valve insufficiency   • Ventricular premature beats   • Ventricular tachycardia   • Chronic atrial fibrillation   • Pneumonia   • Pneumonia with the fungal infection aspergillosis   • Acute respiratory failure with hypoxia   • Acid-fast bacteria present   • Hyponatremia   • C. difficile diarrhea   • DNR (do not resuscitate)   • Sepsis   • Chronic diastolic CHF (congestive heart failure)               Adult Rehabilitation Note       17 1400 03/15/17 1326       Rehab Assessment/Intervention    Discipline physical therapy assistant  -CW physical therapist  -EM     Document Type therapy note (daily note)  -CW therapy note (daily note)  -EM     Subjective Information no complaints;agree to therapy  -CW agree to therapy;complains of;weakness  -EM     Patient Effort, Rehab Treatment adequate  -CW      Precautions/Limitations fall precautions  -CW      Recorded by [CW] Colt Paniagua [EM] Yesenia Larsen, EVELIO     Pain Assessment    Pain Assessment No/denies pain  -CW No/denies pain  -EM     Recorded by [CW] Colt Paniagua [EM] Yesenia Larsen PT     Cognitive Assessment/Intervention    Current Cognitive/Communication Assessment functional  -CW      Orientation Status oriented x 4  -CW      Follows Commands/Answers Questions 100% of the time;able to follow single-step instructions  -CW      Personal Safety WNL/WFL  -CW       Personal Safety Interventions fall prevention program maintained;gait belt;nonskid shoes/slippers when out of bed  -CW      Recorded by [CW] Colt Paniagua      Bed Mobility, Assessment/Treatment    Bed Mob, Supine to Sit, [place] not tested  -CW supervision required  -EM     Bed Mobility, Comment Pt up in chair  -CW      Recorded by [CW] Colt Paniagua [EM] Yesenia Larsen, PT     Transfer Assessment/Treatment    Transfers, Bed-Chair [place]  supervision required   tsf from bed to BSC   -EM     Transfers, Sit-Stand [place] supervision required  -CW supervision required  -EM     Transfers, Stand-Sit [place] supervision required  -CW      Transfers, Sit-Stand-Sit, Assist Device rolling walker  -CW      Recorded by [CW] Colt Paniagua [EM] Yesenia Larsne, PT     Gait Assessment/Treatment    Gait, [place] Level contact guard assist  -CW contact guard assist  -EM     Gait, Assistive Device rolling walker  -CW rolling walker  -EM     Gait, Distance (Feet) 150  -  -EM     Gait, Gait Deviations cecilia decreased;step length decreased;stride length decreased  -CW cecilia decreased  -EM     Gait, Comment  attemped to dario shoes for more support but feet too swollen, used slipper socks   -EM     Recorded by [CW] Colt Paniagua [EM] Yesenia Larsen, PT     Therapy Exercises    Bilateral Lower Extremities AROM:;10 reps;sitting;ankle pumps/circles;glut sets;LAQ;hip flexion  -CW      Recorded by [CW] Colt Paniagua      Positioning and Restraints    Pre-Treatment Position sitting in chair/recliner  -CW in bed  -EM     Post Treatment Position chair  -CW bed  -EM     In Bed  supine;call light within reach  -EM     In Chair notified nsg;reclined;call light within reach;encouraged to call for assist  -CW      Recorded by [CW] Colt Paniagua [EM] Yesenia Larsen, PT       User Key  (r) = Recorded By, (t) = Taken By, (c) = Cosigned By    Initials Name Effective  Dates    EM Yesenia Larsen, PT 12/01/15 -     CW Colt Paniagua 12/13/16 -                 IP PT Goals       03/12/17 1615          Bed Mobility PT LTG    Bed Mobility PT LTG, Date Established 03/12/17  -AL      Bed Mobility PT LTG, Time to Achieve 1 wk  -AL      Bed Mobility PT LTG, Activity Type all bed mobility  -AL      Bed Mobility PT LTG, Ralls Level independent  -AL      Transfer Training PT LTG    Transfer Training PT LTG, Date Established 03/12/17  -AL      Transfer Training PT LTG, Time to Achieve 1 wk  -AL      Transfer Training PT LTG, Activity Type sit to stand/stand to sit  -AL      Transfer Training PT LTG, Ralls Level conditional independence  -AL      Transfer Training PT LTG, Assist Device walker, rolling  -AL      Gait Training PT LTG    Gait Training Goal PT LTG, Date Established 03/12/17  -AL      Gait Training Goal PT LTG, Time to Achieve 1 wk  -AL      Gait Training Goal PT LTG, Ralls Level supervision required  -AL      Gait Training Goal PT LTG, Assist Device walker, rolling  -AL      Gait Training Goal PT LTG, Distance to Achieve 120  -AL        User Key  (r) = Recorded By, (t) = Taken By, (c) = Cosigned By    Initials Name Provider Type    ANDERSON Khan, PT Physical Therapist          Physical Therapy Education     Title: PT OT SLP Therapies (Done)     Topic: Physical Therapy (Done)     Point: Mobility training (Done)    Learning Progress Summary    Learner Readiness Method Response Comment Documented by Status   Patient Acceptance EELIZABETH,VU  CW 03/16/17 1406 Done    Acceptance E VU  EM 03/15/17 1328 Done    Acceptance E VU  AL 03/12/17 1613 Done               Point: Home exercise program (Done)    Learning Progress Summary    Learner Readiness Method Response Comment Documented by Status   Patient Acceptance E,TB WALLACE,VU  CW 03/16/17 1406 Done               Point: Body mechanics (Done)    Learning Progress Summary    Learner Readiness Method Response Comment  Documented by Status   Patient Acceptance ELIZABETH SOOD VU   03/16/17 1406 Done    Acceptance E VU  AL 03/12/17 1613 Done               Point: Precautions (Done)    Learning Progress Summary    Learner Readiness Method Response Comment Documented by Status   Patient Acceptance ELIZABETH SOOD VU   03/16/17 1406 Done    Acceptance E VU  AL 03/12/17 1613 Done                      User Key     Initials Effective Dates Name Provider Type Discipline    AL 12/01/15 -  Sunitha Khan, PT Physical Therapist PT    EM 12/01/15 -  Yesenia Larsen, PT Physical Therapist PT     12/13/16 -  Colt Paniagua Physical Therapy Assistant PT                    PT Recommendation and Plan  Anticipated Equipment Needs At Discharge: front wheeled walker  Anticipated Discharge Disposition: home with home health, inpatient rehabilitation facility  Demonstrates Need for Referral to Another Service:  (has been to [facility]; but really wants to go home with OPPT.)  PT Frequency: daily  Plan of Care Review  Plan Of Care Reviewed With: patient  Progress: progress toward functional goals as expected  Outcome Summary/Follow up Plan: Pt is increasing with amb safety and distance with use of RWX and CGA          Outcome Measures       03/16/17 1400 03/15/17 1300       How much help from another person do you currently need...    Turning from your back to your side while in flat bed without using bedrails? 3  -CW 3  -EM     Moving from lying on back to sitting on the side of a flat bed without bedrails? 3  -CW 3  -EM     Moving to and from a bed to a chair (including a wheelchair)? 3  -CW 3  -EM     Standing up from a chair using your arms (e.g., wheelchair, bedside chair)? 3  -CW 3  -EM     Climbing 3-5 steps with a railing? 2  -CW 2  -EM     To walk in hospital room? 3  -CW 3  -EM     AM-PAC 6 Clicks Score 17  -CW 17  -EM     Functional Assessment    Outcome Measure Options AM-PAC 6 Clicks Basic Mobility (PT)  -CW        User Key  (r) = Recorded By, (t) =  Taken By, (c) = Cosigned By    Initials Name Provider Type    EM Yesenia Larsen, PT Physical Therapist    CW Colt Paniagua Physical Therapy Assistant           Time Calculation:         PT Charges       03/16/17 1407          Time Calculation    Start Time 1327  -CW      Stop Time 1344  -CW      Time Calculation (min) 17 min  -CW      PT Received On 03/16/17  -CW      PT - Next Appointment 03/17/17  -CW        User Key  (r) = Recorded By, (t) = Taken By, (c) = Cosigned By    Initials Name Provider Type    CW Colt Paniagua Physical Therapy Assistant          Therapy Charges for Today     Code Description Service Date Service Provider Modifiers Qty    14714594644 HC PT THER PROC EA 15 MIN 3/16/2017 Colt Paniagua GP 1          PT G-Codes  Outcome Measure Options: AM-PAC 6 Clicks Basic Mobility (PT)    Colt Paniagua  3/16/2017

## 2017-03-16 NOTE — PROGRESS NOTES
LOS: 5 days     Chief Complaint:  Follow-up C diff    Interval History:  Good day until 3 pm y'day had more water BMs but now resolved this morning. She does report some mild abdominal distension. No vomiting. Her appetite is improving each day.    ROS: no rash, no vomiting, no SOA    Vital Signs  Temp:  [97.3 °F (36.3 °C)-97.6 °F (36.4 °C)] 97.4 °F (36.3 °C)  Heart Rate:  [] 112  Resp:  [16-18] 18  BP: (101-122)/(55-82) 122/82    Physical Exam:  General: awake, alert, sitting in chair  Head: Normocephalic  Eyes: no scleral icterus  ENT: MMM, OP clear, no thrush. Fair dentition.   Neck: Supple  Cardiovascular: NR,1+ pitting LE edema  Respiratory:  normal work of breathing on RA  GI: Abdomen is moderately firm and distended, distant bowel sounds in all four quadrants  : no Long catheter present  Musculoskeletal: no joint abnormalities, normal musculature  Skin: No rashes, lesions, or embolic phenomenon  Neurological: Alert and oriented x 3,  motor strength 5/5 in all four extremities  Psychiatric: Normal mood and affect   Vasc: no cyanosis; PIV w/o erythema    Antibiotics:  •  vancomycin oral solution 500 mg, 500 mg, Oral, Q6H, Hayden Landry MD, 500 mg at 03/13/17 0620    LABS:  CBC, Crt, micro reviewed today  Lab Results   Component Value Date    WBC 13.75 (H) 03/16/2017    HGB 9.3 (L) 03/16/2017    HCT 28.4 (L) 03/16/2017    MCV 93.7 03/16/2017     03/16/2017     Lab Results   Component Value Date    CREATININE 0.42 (L) 03/16/2017     3/11 C diff +  LA 1     Microbiology:  3/11 BCx: NGTD     Radiology (personally reviewed images/report):  No new imaging    Assessment/Plan   1. Severe recurrent C difficile  -symptoms slowly improving  -continue vancomycin to 500 mg PO q6h; plan 14 day course through 3/25/17  -no probiotics until after treatment     2. 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 Hyponatremia  4. Afib with RVR - cardiology following. Remains on diltiazem.  5. LE edema -  IVFs stopped. Likely also due to low albumin given GI illness and hospital stay.     Thank you for this consult. ID will follow.

## 2017-03-16 NOTE — PLAN OF CARE
Problem: Patient Care Overview (Adult)  Goal: Plan of Care Review  Outcome: Ongoing (interventions implemented as appropriate)    03/16/17 1406 03/16/17 1824   Coping/Psychosocial Response Interventions   Plan Of Care Reviewed With patient --    Patient Care Overview   Progress progress toward functional goals as expected --    Outcome Evaluation   Outcome Summary/Follow up Plan --  pt walking to bathroom with assistance, denies pain, denies nausea, tolerating meals, abx given, pt stable and states her diarrhea is decreasing.        Goal: Adult Individualization and Mutuality  Outcome: Ongoing (interventions implemented as appropriate)    Problem: Fall Risk (Adult)  Goal: Absence of Falls  Outcome: Ongoing (interventions implemented as appropriate)

## 2017-03-16 NOTE — PROGRESS NOTES
LOS: 5 days     Name: Agnieszka Rizzo  Age/Sex: 86 y.o. female  :  1930        PCP: Gabe Horne MD    Subjective   Cramping and diarrhea worse last evening.  No fevers no blood  General: No Fever or Chills, Cardiac: No Chest Pain or Palpitations, Resp: No Cough or SOA and Other: No bleeding      atorvastatin 40 mg Oral Daily   budesonide-formoterol 2 puff Inhalation BID - RT   cholecalciferol 1,000 Units Oral Daily   dabigatran etexilate 150 mg Oral Q12H   diltiaZEM  mg Oral Q24H   losartan 50 mg Oral Q12H   pantoprazole 40 mg Oral Q AM   vancomycin 500 mg Oral Q6H          Objective   Vital Signs  Temp:  [97.3 °F (36.3 °C)-97.6 °F (36.4 °C)] 97.4 °F (36.3 °C)  Heart Rate:  [] 90  Resp:  [16] 16  BP: (101-122)/(55-82) 122/82  Body mass index is 21.27 kg/(m^2).    Intake/Output Summary (Last 24 hours) at 17 0731  Last data filed at 17 0514   Gross per 24 hour   Intake    350 ml   Output      0 ml   Net    350 ml       General:  Resting comfortably no active distress  Eyes:  PERRL; no conj pallor; EOMI; no scleral icterus  ENT:  oral mucosa moist; pharynx w/o exudate; ext inspect WNL  Neck: nl movement; no JVD; no adenopathy; thyroid WNL  Lungs:  CTA; good air entry; Non labored; No wheeze, No Crackle, No Rhonchi  Cardiac:  RRR; no murmurs;  nl S1/S2  Abd:  soft; mil global tenderness; non distended; no HSM; no masses; BS +  : deferred  Musc/Skel: no arthropathy, or deformity   Skin:  warm and perfused; no apparent rash on examined areas  Neuro: CN grossly intact; no focal deficit of strength or sensory   Ext/P Vasc:  peripheral pulses 2+; no clubbing ; no cyanosis; no edema  MS & Psychiatric: A&O x  3; memory intact; affect/mood appropriate    Results Review:       I reviewed the patient's new clinical results.    Results from last 7 days  Lab Units 03/15/17  0629 17  0601 17  0540 17  0642 17  1808   WBC 10*3/mm3 14.69* 19.13* 24.20* 25.83*  18.40*   HEMOGLOBIN g/dL 9.8* 9.6* 9.7* 10.2* 10.3*   PLATELETS 10*3/mm3 464 396 379 361 367       Results from last 7 days  Lab Units 03/15/17  0629 03/14/17  0601 03/13/17  0540 03/12/17  0642 03/11/17  1808   SODIUM mmol/L 136  --  133* 131* 129*   POTASSIUM mmol/L 4.1  --  3.6 3.3* 3.7   CHLORIDE mmol/L 103  --  97* 92* 91*   TOTAL CO2 mmol/L 22.4  --  23.7 25.1 23.6   BUN mg/dL 11  --  22 10 8   CREATININE mg/dL 0.45* 0.46* 0.68 0.51* 0.53*   CALCIUM mg/dL 8.5*  --  8.0* 8.4* 8.4*   PHOSPHORUS mg/dL 1.7*  --   --   --   --    Estimated Creatinine Clearance: 45.4 mL/min (by C-G formula based on Cr of 0.45).      Assessment/Plan   Principal Problem:    C. difficile diarrhea  Active Problems:    Benign essential hypertension    Chronic coronary artery disease    Chronic atrial fibrillation    Hyponatremia    DNR (do not resuscitate)    Sepsis    Chronic diastolic CHF (congestive heart failure)      PLAN  - Showing signs of clinical improvement but still with symptoms.   - HR improved and oral diltiazem tolerated continue to follow  - monitor hemoglobin no bleeding noted likely dilutional, remained stable  - Na will continue to improve with treatment of underlying condition    Disposition  Probably will plan for discharge if she continues to clinically improve would be Friday.      Lon Chilel MD  [facility]  03/16/17  7:31 AM      EMR Dragon/Transcription disclaimer:     Much of this encounter note is an electronic transcription/translation of spoken language to printed text. The electronic translation of spoken language may permit erroneous, or at times, nonsensical words or phrases to be inadvertently transcribed; Although I have reviewed the note for such errors, some may still exist.

## 2017-03-16 NOTE — PLAN OF CARE
Problem: Patient Care Overview (Adult)  Goal: Plan of Care Review    03/16/17 0336   Coping/Psychosocial Response Interventions   Plan Of Care Reviewed With patient   Outcome Evaluation   Outcome Summary/Follow up Plan Patient resting well. Patient still having diarrhea. No complaints of pain and not in any acute ditstress. Monitored.       Goal: Adult Individualization and Mutuality  Outcome: Ongoing (interventions implemented as appropriate)  Goal: Discharge Needs Assessment  Outcome: Ongoing (interventions implemented as appropriate)    Problem: Fall Risk (Adult)  Goal: Absence of Falls  Outcome: Ongoing (interventions implemented as appropriate)

## 2017-03-16 NOTE — PLAN OF CARE
Problem: Patient Care Overview (Adult)  Goal: Adult Individualization and Mutuality  Outcome: Ongoing (interventions implemented as appropriate)  Goal: Discharge Needs Assessment  Outcome: Ongoing (interventions implemented as appropriate)    Problem: Diarrhea (Adult)  Goal: Improved/Reduced Symptoms  Outcome: Outcome(s) achieved Date Met:  03/16/17

## 2017-03-16 NOTE — NURSING NOTE
Informed Dr. Avalos that patient requested for the call because she is concerned that she has fluids on her lungs and heart because her legs are  still swollen  even after the dose of lasix this am. Patient's lungs clear,RR 16, not in any respiratory distress, RA O2 sat 97 percent). Patient is asking if she can have another dose of lasix. Dr. Avalos said  not to give lasix tonight and patient will be evaluated by her cardologist in  the am, this was relayed to the patient and patient is ok with it. Monitored patient.

## 2017-03-17 ENCOUNTER — APPOINTMENT (OUTPATIENT)
Dept: CARDIOLOGY | Facility: HOSPITAL | Age: 82
End: 2017-03-17
Attending: HOSPITALIST

## 2017-03-17 ENCOUNTER — APPOINTMENT (OUTPATIENT)
Dept: GENERAL RADIOLOGY | Facility: HOSPITAL | Age: 82
End: 2017-03-17
Attending: INTERNAL MEDICINE

## 2017-03-17 LAB
BH CV LOW VAS RIGHT GASTRONEMIUS VESSEL: 1
BH CV LOW VAS RIGHT SAPHENOFEMORAL JUNCTION SPONT: 1
BH CV LOWER VASCULAR LEFT COMMON FEMORAL AUGMENT: NORMAL
BH CV LOWER VASCULAR LEFT COMMON FEMORAL COMPETENT: NORMAL
BH CV LOWER VASCULAR LEFT COMMON FEMORAL COMPRESS: NORMAL
BH CV LOWER VASCULAR LEFT COMMON FEMORAL PHASIC: NORMAL
BH CV LOWER VASCULAR LEFT COMMON FEMORAL SPONT: NORMAL
BH CV LOWER VASCULAR LEFT DISTAL FEMORAL COMPRESS: NORMAL
BH CV LOWER VASCULAR LEFT GASTRONEMIUS COMPRESS: NORMAL
BH CV LOWER VASCULAR LEFT GREATER SAPH AK COMPRESS: NORMAL
BH CV LOWER VASCULAR LEFT GREATER SAPH BK COMPRESS: NORMAL
BH CV LOWER VASCULAR LEFT LESSER SAPH COMPRESS: NORMAL
BH CV LOWER VASCULAR LEFT MID FEMORAL AUGMENT: NORMAL
BH CV LOWER VASCULAR LEFT MID FEMORAL COMPETENT: NORMAL
BH CV LOWER VASCULAR LEFT MID FEMORAL COMPRESS: NORMAL
BH CV LOWER VASCULAR LEFT MID FEMORAL PHASIC: NORMAL
BH CV LOWER VASCULAR LEFT MID FEMORAL SPONT: NORMAL
BH CV LOWER VASCULAR LEFT PERONEAL COMPRESS: NORMAL
BH CV LOWER VASCULAR LEFT POPLITEAL AUGMENT: NORMAL
BH CV LOWER VASCULAR LEFT POPLITEAL COMPETENT: NORMAL
BH CV LOWER VASCULAR LEFT POPLITEAL COMPRESS: NORMAL
BH CV LOWER VASCULAR LEFT POPLITEAL PHASIC: NORMAL
BH CV LOWER VASCULAR LEFT POPLITEAL SPONT: NORMAL
BH CV LOWER VASCULAR LEFT POSTERIOR TIBIAL COMPRESS: NORMAL
BH CV LOWER VASCULAR LEFT PROXIMAL FEMORAL COMPRESS: NORMAL
BH CV LOWER VASCULAR LEFT SAPHENOFEMORAL JUNCTION AUGMENT: NORMAL
BH CV LOWER VASCULAR LEFT SAPHENOFEMORAL JUNCTION COMPETENT: NORMAL
BH CV LOWER VASCULAR LEFT SAPHENOFEMORAL JUNCTION COMPRESS: NORMAL
BH CV LOWER VASCULAR LEFT SAPHENOFEMORAL JUNCTION PHASIC: NORMAL
BH CV LOWER VASCULAR LEFT SAPHENOFEMORAL JUNCTION SPONT: NORMAL
BH CV LOWER VASCULAR RIGHT COMMON FEMORAL AUGMENT: NORMAL
BH CV LOWER VASCULAR RIGHT COMMON FEMORAL COMPETENT: NORMAL
BH CV LOWER VASCULAR RIGHT COMMON FEMORAL COMPRESS: NORMAL
BH CV LOWER VASCULAR RIGHT COMMON FEMORAL PHASIC: NORMAL
BH CV LOWER VASCULAR RIGHT COMMON FEMORAL SPONT: NORMAL
BH CV LOWER VASCULAR RIGHT DISTAL FEMORAL COMPRESS: NORMAL
BH CV LOWER VASCULAR RIGHT GASTRONEMIUS COMPRESS: NORMAL
BH CV LOWER VASCULAR RIGHT GASTRONEMIUS THROMBUS: NORMAL
BH CV LOWER VASCULAR RIGHT GREATER SAPH AK COMPRESS: NORMAL
BH CV LOWER VASCULAR RIGHT GREATER SAPH BK COMPRESS: NORMAL
BH CV LOWER VASCULAR RIGHT LESSER SAPH COMPRESS: NORMAL
BH CV LOWER VASCULAR RIGHT MID FEMORAL AUGMENT: NORMAL
BH CV LOWER VASCULAR RIGHT MID FEMORAL COMPETENT: NORMAL
BH CV LOWER VASCULAR RIGHT MID FEMORAL COMPRESS: NORMAL
BH CV LOWER VASCULAR RIGHT MID FEMORAL PHASIC: NORMAL
BH CV LOWER VASCULAR RIGHT MID FEMORAL SPONT: NORMAL
BH CV LOWER VASCULAR RIGHT PERONEAL COMPRESS: NORMAL
BH CV LOWER VASCULAR RIGHT POPLITEAL AUGMENT: NORMAL
BH CV LOWER VASCULAR RIGHT POPLITEAL COMPETENT: NORMAL
BH CV LOWER VASCULAR RIGHT POPLITEAL COMPRESS: NORMAL
BH CV LOWER VASCULAR RIGHT POPLITEAL PHASIC: NORMAL
BH CV LOWER VASCULAR RIGHT POPLITEAL SPONT: NORMAL
BH CV LOWER VASCULAR RIGHT POSTERIOR TIBIAL COMPRESS: NORMAL
BH CV LOWER VASCULAR RIGHT PROXIMAL FEMORAL COMPRESS: NORMAL
BH CV LOWER VASCULAR RIGHT SAPHENOFEMORAL JUNCTION AUGMENT: NORMAL
BH CV LOWER VASCULAR RIGHT SAPHENOFEMORAL JUNCTION COMPETENT: NORMAL
BH CV LOWER VASCULAR RIGHT SAPHENOFEMORAL JUNCTION COMPRESS: NORMAL
BH CV LOWER VASCULAR RIGHT SAPHENOFEMORAL JUNCTION PHASIC: NORMAL
BH CV LOWER VASCULAR RIGHT SAPHENOFEMORAL JUNCTION SPONT: NORMAL
CREAT BLD-MCNC: 0.49 MG/DL (ref 0.57–1)
DEPRECATED RDW RBC AUTO: 55.6 FL (ref 37–54)
ERYTHROCYTE [DISTWIDTH] IN BLOOD BY AUTOMATED COUNT: 15.9 % (ref 11.7–13)
GFR SERPL CREATININE-BSD FRML MDRD: 120 ML/MIN/1.73
HCT VFR BLD AUTO: 32.5 % (ref 35.6–45.5)
HGB BLD-MCNC: 10.5 G/DL (ref 11.9–15.5)
MCH RBC QN AUTO: 30.8 PG (ref 26.9–32)
MCHC RBC AUTO-ENTMCNC: 32.3 G/DL (ref 32.4–36.3)
MCV RBC AUTO: 95.3 FL (ref 80.5–98.2)
PLATELET # BLD AUTO: 458 10*3/MM3 (ref 140–500)
PMV BLD AUTO: 9.6 FL (ref 6–12)
RBC # BLD AUTO: 3.41 10*6/MM3 (ref 3.9–5.2)
WBC NRBC COR # BLD: 11.56 10*3/MM3 (ref 4.5–10.7)

## 2017-03-17 PROCEDURE — 82565 ASSAY OF CREATININE: CPT | Performed by: INTERNAL MEDICINE

## 2017-03-17 PROCEDURE — 85027 COMPLETE CBC AUTOMATED: CPT | Performed by: INTERNAL MEDICINE

## 2017-03-17 PROCEDURE — 93970 EXTREMITY STUDY: CPT

## 2017-03-17 PROCEDURE — 74000 HC ABDOMEN KUB: CPT

## 2017-03-17 PROCEDURE — 94799 UNLISTED PULMONARY SVC/PX: CPT

## 2017-03-17 PROCEDURE — 99232 SBSQ HOSP IP/OBS MODERATE 35: CPT | Performed by: INTERNAL MEDICINE

## 2017-03-17 RX ADMIN — ATORVASTATIN CALCIUM 40 MG: 40 TABLET, FILM COATED ORAL at 15:13

## 2017-03-17 RX ADMIN — LOSARTAN POTASSIUM 50 MG: 50 TABLET, FILM COATED ORAL at 10:39

## 2017-03-17 RX ADMIN — VITAMIN D, TAB 1000IU (100/BT) 1000 UNITS: 25 TAB at 10:39

## 2017-03-17 RX ADMIN — VANCOMYCIN 500 MG: KIT at 15:13

## 2017-03-17 RX ADMIN — DABIGATRAN ETEXILATE MESYLATE 150 MG: 150 CAPSULE ORAL at 10:39

## 2017-03-17 RX ADMIN — VANCOMYCIN 500 MG: KIT at 23:55

## 2017-03-17 RX ADMIN — BUDESONIDE AND FORMOTEROL FUMARATE DIHYDRATE 2 PUFF: 160; 4.5 AEROSOL RESPIRATORY (INHALATION) at 20:31

## 2017-03-17 RX ADMIN — VANCOMYCIN 500 MG: KIT at 19:34

## 2017-03-17 RX ADMIN — DABIGATRAN ETEXILATE MESYLATE 150 MG: 150 CAPSULE ORAL at 21:53

## 2017-03-17 RX ADMIN — LOSARTAN POTASSIUM 50 MG: 50 TABLET, FILM COATED ORAL at 21:53

## 2017-03-17 RX ADMIN — VANCOMYCIN 500 MG: KIT at 06:55

## 2017-03-17 RX ADMIN — DILTIAZEM HYDROCHLORIDE 360 MG: 180 CAPSULE, COATED, EXTENDED RELEASE ORAL at 10:39

## 2017-03-17 RX ADMIN — VANCOMYCIN 500 MG: KIT at 00:10

## 2017-03-17 RX ADMIN — PANTOPRAZOLE SODIUM 40 MG: 40 TABLET, DELAYED RELEASE ORAL at 06:55

## 2017-03-17 RX ADMIN — BUDESONIDE AND FORMOTEROL FUMARATE DIHYDRATE 2 PUFF: 160; 4.5 AEROSOL RESPIRATORY (INHALATION) at 08:25

## 2017-03-17 NOTE — PROGRESS NOTES
LOS: 6 days     Chief Complaint:  Follow-up C diff    Interval History:  She reports BMs q2h hours overnight. She reports mild-moderate abdominal bloating with some diffuse cramping pain. She denies fevers. She has LE edema that is unchanged. Her appetite is improving.    ROS: no rash, no vomiting, no SOA    Vital Signs  Temp:  [96.9 °F (36.1 °C)-97.7 °F (36.5 °C)] 97.7 °F (36.5 °C)  Heart Rate:  [] 90  Resp:  [16-20] 20  BP: (100-115)/(51-73) 104/73    Physical Exam:  General: awake, alert, sitting in chair  Head: Normocephalic  Eyes: no scleral icterus  ENT: MMM, OP clear, no thrush. Fair dentition.   Neck: Supple  Cardiovascular: NR,2+ pitting LE edema  Respiratory:  normal work of breathing on RA  GI: Abdomen is moderately firm and distended, distant bowel sounds in all four quadrants  : no Long catheter present  Musculoskeletal: no joint abnormalities, normal musculature  Skin: No rashes, lesions, or embolic phenomenon  Neurological: Alert and oriented x 3,  motor strength 5/5 in all four extremities  Psychiatric: Normal mood and affect   Vasc: no cyanosis; PIV w/o erythema    Antibiotics:  •  vancomycin oral solution 500 mg, 500 mg, Oral, Q6H, Hayden Landry MD, 500 mg at 03/13/17 0620    LABS:  CBC, Crt, micro reviewed today  Lab Results   Component Value Date    WBC 11.56 (H) 03/17/2017    HGB 10.5 (L) 03/17/2017    HCT 32.5 (L) 03/17/2017    MCV 95.3 03/17/2017     03/17/2017     Lab Results   Component Value Date    CREATININE 0.49 (L) 03/17/2017     3/11 C diff +  LA 1     Microbiology:  3/11 BCx: NGTD     Radiology (personally reviewed images/report):  No new imaging    Assessment/Plan   1. Recurrent C difficile colitis  -continue vancomycin to 500 mg PO q6h; plan 14 day course through 3/25/17  -repeat KUB  -no probiotics until after treatment  -I had hoped for more improvement by this point; continue vancomycin; I'm not certain that changing to Dificid would lead to more  rapid improvement but could be a consideration     2. 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 Hyponatremia  4. Afib with RVR - cardiology following. Remains on diltiazem.  5. LE edema -  Check CMP for TP and Alb in AM. Will also add prealbumin.     Thank you for this consult. ID will follow.

## 2017-03-17 NOTE — PROGRESS NOTES
Bilateral lower extremity venous doppler completed, Prelim positive Rt calf vein thrombus and Lt negative given to DOLORES Santos

## 2017-03-17 NOTE — PLAN OF CARE
Problem: Patient Care Overview (Adult)  Goal: Plan of Care Review  Outcome: Outcome(s) achieved Date Met:  03/17/17 03/17/17 0610   Coping/Psychosocial Response Interventions   Plan Of Care Reviewed With patient   Patient Care Overview   Progress progress toward functional goals as expected   Outcome Evaluation   Outcome Summary/Follow up Plan Ambulates to bathroom with assist of one using walker. Less loose stools this shift. Rested at intervals. No distress noted.       Goal: Adult Individualization and Mutuality  Outcome: Ongoing (interventions implemented as appropriate)  Goal: Discharge Needs Assessment  Outcome: Ongoing (interventions implemented as appropriate)    Problem: Fall Risk (Adult)  Goal: Absence of Falls  Outcome: Ongoing (interventions implemented as appropriate)

## 2017-03-17 NOTE — PROGRESS NOTES
LOS: 6 days     Name: Agnieszka Rizzo  Age/Sex: 86 y.o. female  :  1930        PCP: Gabe Horne MD    Subjective   Cramping and diarrhea worse last evening.  No fevers no blood, complaining of continued edema in her LUE and travis LE.    General: No Fever or Chills, Cardiac: No Chest Pain or Palpitations, Resp: No Cough or SOA and Other: No bleeding      atorvastatin 40 mg Oral Daily   budesonide-formoterol 2 puff Inhalation BID - RT   cholecalciferol 1,000 Units Oral Daily   dabigatran etexilate 150 mg Oral Q12H   diltiaZEM  mg Oral Q24H   losartan 50 mg Oral Q12H   pantoprazole 40 mg Oral Q AM   vancomycin 500 mg Oral Q6H        Objective   Vital Signs  Temp:  [96.9 °F (36.1 °C)-97.7 °F (36.5 °C)] 97.7 °F (36.5 °C)  Heart Rate:  [] 90  Resp:  [16-20] 20  BP: (100-115)/(51-73) 104/73  Body mass index is 21.27 kg/(m^2).    Intake/Output Summary (Last 24 hours) at 17 1126  Last data filed at 17 0433   Gross per 24 hour   Intake    320 ml   Output      0 ml   Net    320 ml       General:  Resting comfortably no active distress  Eyes:  PERRL; no conj pallor; EOMI; no scleral icterus  ENT:  oral mucosa moist; pharynx w/o exudate; ext inspect WNL  Neck: nl movement; no JVD; no adenopathy; thyroid WNL  Lungs:  CTA; good air entry; Non labored; No wheeze, No Crackle, No Rhonchi  Cardiac:  RRR; no murmurs;  nl S1/S2  Abd:  soft; mil global tenderness; non distended; no HSM; no masses; BS +  : deferred  Musc/Skel: no arthropathy, or deformity   Skin:  warm and perfused; no apparent rash on examined areas  Neuro: CN grossly intact; no focal deficit of strength or sensory   Ext/P Vasc:  peripheral pulses 2+; no clubbing ; no cyanosis; no edema  MS & Psychiatric: A&O x  3; memory intact; affect/mood appropriate    Results Review:       I reviewed the patient's new clinical results.    Results from last 7 days  Lab Units 17  0640 17  0705 03/15/17  0629 17  0601  03/13/17  0540 03/12/17  0642 03/11/17  1808   WBC 10*3/mm3 11.56* 13.75* 14.69* 19.13* 24.20* 25.83* 18.40*   HEMOGLOBIN g/dL 10.5* 9.3* 9.8* 9.6* 9.7* 10.2* 10.3*   PLATELETS 10*3/mm3 458 426 464 396 379 361 367       Results from last 7 days  Lab Units 03/17/17  0640 03/16/17  0705 03/15/17  0629 03/14/17  0601 03/13/17  0540 03/12/17  0642 03/11/17  1808   SODIUM mmol/L  --  134* 136  --  133* 131* 129*   POTASSIUM mmol/L  --  3.7 4.1  --  3.6 3.3* 3.7   CHLORIDE mmol/L  --  100 103  --  97* 92* 91*   TOTAL CO2 mmol/L  --  24.2 22.4  --  23.7 25.1 23.6   BUN mg/dL  --  7* 11  --  22 10 8   CREATININE mg/dL 0.49* 0.42* 0.45* 0.46* 0.68 0.51* 0.53*   CALCIUM mg/dL  --  8.0* 8.5*  --  8.0* 8.4* 8.4*   PHOSPHORUS mg/dL  --   --  1.7*  --   --   --   --    Estimated Creatinine Clearance: 45.4 mL/min (by C-G formula based on Cr of 0.49).    Assessment/Plan   Principal Problem:    C. difficile diarrhea  Active Problems:    Benign essential hypertension    Chronic coronary artery disease    Chronic atrial fibrillation    Hyponatremia    DNR (do not resuscitate)    Sepsis    Chronic diastolic CHF (congestive heart failure)    PLAN  - Showing signs of clinical improvement but still with symptoms and increased BM's overnight, WBC continue to improve  - edema is likely nutritional and secondary to IVF's, oddly no edema in RUE.  Follow up nutritional labs in the AM and check LE doplars  - HR improved and oral diltiazem tolerated continue to follow  - monitor hemoglobin no bleeding noted likely dilutional, remained stable  - Na will continue to improve with treatment of underlying condition    Disposition        Lon Chilel MD  [facility]  03/17/17  1000    EMR Dragon/Transcription disclaimer:     Much of this encounter note is an electronic transcription/translation of spoken language to printed text. The electronic translation of spoken language may permit erroneous, or at times, nonsensical  words or phrases to be inadvertently transcribed; Although I have reviewed the note for such errors, some may still exist.

## 2017-03-17 NOTE — SIGNIFICANT NOTE
03/17/17 1300   Rehab Treatment   Discipline physical therapy assistant   Treatment Not Performed patient unavailable for treatment  (Pt off floor.  PT to check back tomorrow)   Recommendation   PT - Next Appointment 03/18/17

## 2017-03-18 LAB
ALBUMIN SERPL-MCNC: 2.1 G/DL (ref 3.5–5.2)
ALBUMIN/GLOB SERPL: 0.9 G/DL
ALP SERPL-CCNC: 52 U/L (ref 39–117)
ALT SERPL W P-5'-P-CCNC: 14 U/L (ref 1–33)
ANION GAP SERPL CALCULATED.3IONS-SCNC: 7.4 MMOL/L
AST SERPL-CCNC: 19 U/L (ref 1–32)
BILIRUB SERPL-MCNC: 0.3 MG/DL (ref 0.1–1.2)
BUN BLD-MCNC: 6 MG/DL (ref 8–23)
BUN/CREAT SERPL: 17.1 (ref 7–25)
CALCIUM SPEC-SCNC: 7.9 MG/DL (ref 8.6–10.5)
CHLORIDE SERPL-SCNC: 101 MMOL/L (ref 98–107)
CO2 SERPL-SCNC: 27.6 MMOL/L (ref 22–29)
CREAT BLD-MCNC: 0.35 MG/DL (ref 0.57–1)
DEPRECATED RDW RBC AUTO: 55.4 FL (ref 37–54)
ERYTHROCYTE [DISTWIDTH] IN BLOOD BY AUTOMATED COUNT: 15.9 % (ref 11.7–13)
GFR SERPL CREATININE-BSD FRML MDRD: >150 ML/MIN/1.73
GLOBULIN UR ELPH-MCNC: 2.4 GM/DL
GLUCOSE BLD-MCNC: 101 MG/DL (ref 65–99)
HCT VFR BLD AUTO: 30.4 % (ref 35.6–45.5)
HGB BLD-MCNC: 9.6 G/DL (ref 11.9–15.5)
MCH RBC QN AUTO: 30 PG (ref 26.9–32)
MCHC RBC AUTO-ENTMCNC: 31.6 G/DL (ref 32.4–36.3)
MCV RBC AUTO: 95 FL (ref 80.5–98.2)
PLATELET # BLD AUTO: 413 10*3/MM3 (ref 140–500)
PMV BLD AUTO: 9.7 FL (ref 6–12)
POTASSIUM BLD-SCNC: 3.5 MMOL/L (ref 3.5–5.2)
PREALB SERPL-MCNC: 8.9 MG/DL (ref 20–40)
PROT SERPL-MCNC: 4.5 G/DL (ref 6–8.5)
RBC # BLD AUTO: 3.2 10*6/MM3 (ref 3.9–5.2)
SODIUM BLD-SCNC: 136 MMOL/L (ref 136–145)
WBC NRBC COR # BLD: 9.21 10*3/MM3 (ref 4.5–10.7)

## 2017-03-18 PROCEDURE — 97110 THERAPEUTIC EXERCISES: CPT

## 2017-03-18 PROCEDURE — 85027 COMPLETE CBC AUTOMATED: CPT | Performed by: INTERNAL MEDICINE

## 2017-03-18 PROCEDURE — 94799 UNLISTED PULMONARY SVC/PX: CPT

## 2017-03-18 PROCEDURE — 80053 COMPREHEN METABOLIC PANEL: CPT | Performed by: INTERNAL MEDICINE

## 2017-03-18 PROCEDURE — 84134 ASSAY OF PREALBUMIN: CPT | Performed by: INTERNAL MEDICINE

## 2017-03-18 RX ADMIN — PANTOPRAZOLE SODIUM 40 MG: 40 TABLET, DELAYED RELEASE ORAL at 05:57

## 2017-03-18 RX ADMIN — ATORVASTATIN CALCIUM 40 MG: 40 TABLET, FILM COATED ORAL at 09:03

## 2017-03-18 RX ADMIN — DILTIAZEM HYDROCHLORIDE 360 MG: 180 CAPSULE, COATED, EXTENDED RELEASE ORAL at 09:03

## 2017-03-18 RX ADMIN — DABIGATRAN ETEXILATE MESYLATE 150 MG: 150 CAPSULE ORAL at 20:49

## 2017-03-18 RX ADMIN — BUDESONIDE AND FORMOTEROL FUMARATE DIHYDRATE 2 PUFF: 160; 4.5 AEROSOL RESPIRATORY (INHALATION) at 08:05

## 2017-03-18 RX ADMIN — VITAMIN D, TAB 1000IU (100/BT) 1000 UNITS: 25 TAB at 09:03

## 2017-03-18 RX ADMIN — DABIGATRAN ETEXILATE MESYLATE 150 MG: 150 CAPSULE ORAL at 09:03

## 2017-03-18 RX ADMIN — VANCOMYCIN 500 MG: KIT at 12:27

## 2017-03-18 RX ADMIN — VANCOMYCIN 500 MG: KIT at 18:05

## 2017-03-18 RX ADMIN — BUDESONIDE AND FORMOTEROL FUMARATE DIHYDRATE 2 PUFF: 160; 4.5 AEROSOL RESPIRATORY (INHALATION) at 20:12

## 2017-03-18 RX ADMIN — VANCOMYCIN 500 MG: KIT at 05:57

## 2017-03-18 RX ADMIN — LOSARTAN POTASSIUM 50 MG: 50 TABLET, FILM COATED ORAL at 20:49

## 2017-03-18 RX ADMIN — LOSARTAN POTASSIUM 50 MG: 50 TABLET, FILM COATED ORAL at 09:03

## 2017-03-18 NOTE — PLAN OF CARE
Problem: Patient Care Overview (Adult)  Goal: Adult Individualization and Mutuality  Outcome: Ongoing (interventions implemented as appropriate)    03/17/17 2051   Individualization   Patient Specific Goals to be discharged as soon as possible         03/17/17 2051   Individualization   Patient Specific Goals to be discharged as soon as possible         Problem: Fall Risk (Adult)  Goal: Absence of Falls  Outcome: Ongoing (interventions implemented as appropriate)

## 2017-03-18 NOTE — PLAN OF CARE
Problem: Inpatient Physical Therapy  Goal: Gait Training Goal LTG- PT  Outcome: Revised    03/18/17 0941   Gait Training PT LTG   Gait Training Goal PT LTG, Date Established 03/18/17   Gait Training Goal PT LTG, Time to Achieve by discharge   Gait Training Goal PT LTG, [place] Level conditional independence   Gait Training Goal PT LTG, Distance to Achieve 150

## 2017-03-18 NOTE — PROGRESS NOTES
LOS: 7 days     Name: Agnieszka Rizzo  Age/Sex: 86 y.o. female  :  1930        PCP: Gabe Horne MD    Subjective   No diarrhea since about 4 AM this morning.  She says that bowel movement was more formed than the last few days.  Cramping is improved as well she was able to eat breakfast this morning without any pain.  General: No Fever or Chills, Cardiac: No Chest Pain or Palpitations, Resp: No Cough or SOA and Other: No bleeding      atorvastatin 40 mg Oral Daily   budesonide-formoterol 2 puff Inhalation BID - RT   cholecalciferol 1,000 Units Oral Daily   dabigatran etexilate 150 mg Oral Q12H   diltiaZEM  mg Oral Q24H   losartan 50 mg Oral Q12H   pantoprazole 40 mg Oral Q AM   vancomycin 500 mg Oral Q6H        Objective   Vital Signs  Temp:  [97.2 °F (36.2 °C)-98.4 °F (36.9 °C)] 98.4 °F (36.9 °C)  Heart Rate:  [] 90  Resp:  [16-20] 16  BP: (106-126)/(63-82) 116/67  Body mass index is 21.27 kg/(m^2).    Intake/Output Summary (Last 24 hours) at 17 1249  Last data filed at 17 0558   Gross per 24 hour   Intake    300 ml   Output      0 ml   Net    300 ml       General:  Resting comfortably no active distress  Eyes:  PERRL; no conj pallor; EOMI; no scleral icterus  ENT:  oral mucosa moist; pharynx w/o exudate; ext inspect WNL  Neck: nl movement; no JVD; no adenopathy; thyroid WNL  Lungs:  CTA; good air entry; Non labored; No wheeze, No Crackle, No Rhonchi  Cardiac:  RRR; no murmurs;  nl S1/S2  Abd:  soft; mil global tenderness; non distended; no HSM; no masses; BS +  : deferred  Musc/Skel: no arthropathy, or deformity   Skin:  warm and perfused; no apparent rash on examined areas  Neuro: CN grossly intact; no focal deficit of strength or sensory   Ext/P Vasc:  peripheral pulses 2+; no clubbing ; no cyanosis; 2+ nonpitting edema  MS & Psychiatric: A&O x  3; memory intact; affect/mood appropriate    Results Review:       I reviewed the patient's new clinical  results.    Results from last 7 days  Lab Units 03/18/17  0535 03/17/17  0640 03/16/17  0705 03/15/17  0629 03/14/17  0601 03/13/17  0540 03/12/17  0642   WBC 10*3/mm3 9.21 11.56* 13.75* 14.69* 19.13* 24.20* 25.83*   HEMOGLOBIN g/dL 9.6* 10.5* 9.3* 9.8* 9.6* 9.7* 10.2*   PLATELETS 10*3/mm3 413 458 426 464 396 379 361       Results from last 7 days  Lab Units 03/18/17  0535 03/17/17  0640 03/16/17  0705 03/15/17  0629 03/14/17  0601 03/13/17  0540 03/12/17  0642 03/11/17  1808   SODIUM mmol/L 136  --  134* 136  --  133* 131* 129*   POTASSIUM mmol/L 3.5  --  3.7 4.1  --  3.6 3.3* 3.7   CHLORIDE mmol/L 101  --  100 103  --  97* 92* 91*   TOTAL CO2 mmol/L 27.6  --  24.2 22.4  --  23.7 25.1 23.6   BUN mg/dL 6*  --  7* 11  --  22 10 8   CREATININE mg/dL 0.35* 0.49* 0.42* 0.45* 0.46* 0.68 0.51* 0.53*   CALCIUM mg/dL 7.9*  --  8.0* 8.5*  --  8.0* 8.4* 8.4*   PHOSPHORUS mg/dL  --   --   --  1.7*  --   --   --   --    Estimated Creatinine Clearance: 45.4 mL/min (by C-G formula based on Cr of 0.35).    Assessment/Plan   Principal Problem:    C. difficile diarrhea  Active Problems:    Benign essential hypertension    Chronic coronary artery disease    Chronic atrial fibrillation    Hyponatremia    DNR (do not resuscitate)    Sepsis    Chronic diastolic CHF (congestive heart failure)    PLAN  - Showing signs of clinical improvement but still with symptoms and increased BM's overnight, WBC continue to improve, x-ray reviewed no signs of ileus or obstruction.  - Lower extremity Dopplers are negative again based on lab evaluation low albumin pre-albumin, encourage oral intake and protein supplementation.  - HR improved and oral diltiazem tolerated continue to follow  - monitor hemoglobin no bleeding noted likely dilutional, remained stable  - Na will continue to improve with treatment of underlying condition    Disposition  I'm not really sure what's going on with her plan for discharge.  She should be medically stable tomorrow she  continues to improve.  However today she is telling me that she can't go home until Thursday of next week.  If this is the case she may need rehabilitation at discharge last cooperative care discussed with the patient today.      Lon Chilel MD  [facility]  03/18/17  1000    EMR Dragon/Transcription disclaimer:     Much of this encounter note is an electronic transcription/translation of spoken language to printed text. The electronic translation of spoken language may permit erroneous, or at times, nonsensical words or phrases to be inadvertently transcribed; Although I have reviewed the note for such errors, some may still exist.

## 2017-03-18 NOTE — PROGRESS NOTES
Acute Care - Physical Therapy Treatment Note  [facility]     Patient Name: Agnieszka Rizzo  : 1930  MRN: [id number]  Today's Date: 3/18/2017     Date of Referral to PT: 17       Admit Date: 3/11/2017    Visit Dx:    ICD-10-CM ICD-9-CM   1. C. difficile colitis A04.7 008.45   2. Atrial fibrillation with RVR I48.91 427.31   3. Weakness generalized R53.1 780.79     Patient Active Problem List   Diagnosis   • Pneumothorax of right lung after biopsy   • Pulmonary aspergillosis   • Benign essential hypertension   • Chronic coronary artery disease   • Hyperlipidemia   • Mitral valve insufficiency   • Ventricular premature beats   • Ventricular tachycardia   • Chronic atrial fibrillation   • Pneumonia   • Pneumonia with the fungal infection aspergillosis   • Acute respiratory failure with hypoxia   • Acid-fast bacteria present   • Hyponatremia   • C. difficile diarrhea   • DNR (do not resuscitate)   • Sepsis   • Chronic diastolic CHF (congestive heart failure)               Adult Rehabilitation Note       17 0900 17 1400 03/15/17 1326    Rehab Assessment/Intervention    Discipline physical therapist  -SP physical therapy assistant  -CW physical therapist  -EM    Document Type  therapy note (daily note)  -CW therapy note (daily note)  -EM    Subjective Information no complaints;agree to therapy  -SP no complaints;agree to therapy  -CW agree to therapy;complains of;weakness  -EM    Patient Effort, Rehab Treatment good  -SP adequate  -CW     Precautions/Limitations fall precautions;other (see comments)   contact   -SP fall precautions  -CW     Recorded by [SP] Sherron Pathak, PT [CW] Colt Paniagua [EM] Yesenia Larsen PT    Vital Signs    Pre SpO2 (%) 95  -SP      O2 Delivery Pre Treatment room air  -SP      Recorded by [SP] Sherron Pathak, PT      Pain Assessment    Pain Assessment No/denies pain  -SP No/denies pain  -CW No/denies pain  -EM    Recorded by [SP] Sherron Pathak, PT [CW]  Colt Paniagua [EM] Yesenia Larsen, PT    Cognitive Assessment/Intervention    Current Cognitive/Communication Assessment  functional  -CW     Orientation Status oriented x 4  -SP oriented x 4  -CW     Follows Commands/Answers Questions 100% of the time  -% of the time;able to follow single-step instructions  -CW     Personal Safety good awareness, safety precautions  -SP WNL/WFL  -CW     Personal Safety Interventions gait belt;fall prevention program maintained;nonskid shoes/slippers when out of bed   Pt seated in bathroom on toilet with call bell, to call out   -SP fall prevention program maintained;gait belt;nonskid shoes/slippers when out of bed  -CW     Recorded by [SP] Sherron Pathak, PT [CW] Colt Paniagua     ROM (Range of Motion)    General ROM Detail tight ankles due to edema   -SP      Recorded by [SP] Sherron Pathak, PT      Bed Mobility, Assessment/Treatment    Bed Mob, Supine to Sit, [place]  not tested  -CW supervision required  -EM    Bed Mobility, Comment  Pt up in chair  -CW     Recorded by  [CW] Colt Paniagua [EM] Yesenia Larsen, PT    Transfer Assessment/Treatment    Transfers, Bed-Chair [place]   supervision required   tsf from bed to BSC   -EM    Transfers, Sit-Stand [place] supervision required;set up required  -SP supervision required  -CW supervision required  -EM    Transfers, Stand-Sit [place] supervision required  -SP supervision required  -CW     Transfers, Sit-Stand-Sit, Assist Device standard walker  -SP rolling walker  -CW     Recorded by [SP] Sherron Pathak, PT [CW] Colt Paniagua [EM] Yesenia Larsen, PT    Gait Assessment/Treatment    Gait, [place] Level set up required;supervision required  -SP contact guard assist  -CW contact guard assist  -EM    Gait, Assistive Device standard walker   appropriate for roll wx, has at home   -SP rolling walker  -CW rolling walker  -EM    Gait, Distance (Feet) 150  -  -   -EM    Gait, Gait Pattern Analysis 2-point gait  -SP      Gait, Gait Deviations cecilia decreased  -SP cecilia decreased;step length decreased;stride length decreased  -CW cecilia decreased  -EM    Gait, Comment   attemped to dario shoes for more support but feet too swollen, used slipper socks   -EM    Recorded by [SP] Sherron Pathak, PT [CW] Clot Paniagua [EM] Yesenia Larsen, PT    Therapy Exercises    Bilateral Lower Extremities  AROM:;10 reps;sitting;ankle pumps/circles;glut sets;LAQ;hip flexion  -CW     Recorded by  [CW] Colt Paniagua     Positioning and Restraints    Pre-Treatment Position sitting in chair/recliner  -SP sitting in chair/recliner  -CW in bed  -EM    Post Treatment Position bathroom  -SP chair  -CW bed  -EM    In Bed   supine;call light within reach  -EM    In Chair  notified nsg;reclined;call light within reach;encouraged to call for assist  -CW     Bathroom notified nsg;call light within reach;encouraged to call for assist  -SP      Restraints notified nsg:  -SP      Recorded by [SP] Sherron Pathak, PT [CW] Colt Paniagua [EM] Yesenia Larsen, PT      User Key  (r) = Recorded By, (t) = Taken By, (c) = Cosigned By    Initials Name Effective Dates    EM Yesenia Larsen, PT 12/01/15 -     CW Colt Paniagua 12/13/16 -     SP Sherron Pathak, PT 01/09/17 -                 IP PT Goals       03/18/17 0941 03/12/17 1615       Bed Mobility PT LTG    Bed Mobility PT LTG, Date Established  03/12/17  -AL     Bed Mobility PT LTG, Time to Achieve  1 wk  -AL     Bed Mobility PT LTG, Activity Type  all bed mobility  -AL     Bed Mobility PT LTG, [place] Level  independent  -AL     Transfer Training PT LTG    Transfer Training PT LTG, Date Established  03/12/17  -AL     Transfer Training PT LTG, Time to Achieve  1 wk  -AL     Transfer Training PT LTG, Activity Type  sit to stand/stand to sit  -AL     Transfer Training PT LTG, [place] Level  conditional independence  -AL      Transfer Training PT LTG, Assist Device  walker, rolling  -AL     Gait Training PT LTG    Gait Training Goal PT LTG, Date Established 03/18/17  -SP 03/12/17  -AL     Gait Training Goal PT LTG, Time to Achieve by discharge  -SP 1 wk  -AL     Gait Training Goal PT LTG, [place] Level conditional independence  -SP supervision required  -AL     Gait Training Goal PT LTG, Assist Device  walker, rolling  -AL     Gait Training Goal PT LTG, Distance to Achieve 150  -  -AL       User Key  (r) = Recorded By, (t) = Taken By, (c) = Cosigned By    Initials Name Provider Type    AL Sunitha Khan, PT Physical Therapist    SP Sherron Pathak, PT Physical Therapist          Physical Therapy Education     Title: PT OT SLP Therapies (Done)     Topic: Physical Therapy (Done)     Point: Mobility training (Done)    Learning Progress Summary    Learner Readiness Method Response Comment Documented by Status   Patient Acceptance E VU  SP 03/18/17 0939 Done    Acceptance E,TB DU,VU  CW 03/16/17 1406 Done    Acceptance E VU  EM 03/15/17 1328 Done    Acceptance E VU  AL 03/12/17 1613 Done               Point: Home exercise program (Done)    Learning Progress Summary    Learner Readiness Method Response Comment Documented by Status   Patient Acceptance E,TB DU,VU  CW 03/16/17 1406 Done               Point: Body mechanics (Done)    Learning Progress Summary    Learner Readiness Method Response Comment Documented by Status   Patient Acceptance E,TB DU,VU  CW 03/16/17 1406 Done    Acceptance E VU  AL 03/12/17 1613 Done               Point: Precautions (Done)    Learning Progress Summary    Learner Readiness Method Response Comment Documented by Status   Patient Acceptance E VU  SP 03/18/17 0939 Done    Acceptance E,TB DU,VU  CW 03/16/17 1406 Done    Acceptance E VU  AL 03/12/17 1613 Done                      User Key     Initials Effective Dates Name Provider Type [facility] 12/01/15 -  Sunitha Khan, PT Physical Therapist PT    EM  12/01/15 -  Yesenia Larsen, PT Physical Therapist PT    CW 12/13/16 -  Colt Paniagua Physical Therapy Assistant PT    SP 01/09/17 -  Sherron Pathak, PT Physical Therapist PT                    PT Recommendation and Plan  Anticipated Equipment Needs At Discharge: front wheeled walker  Anticipated Discharge Disposition: home with home health, inpatient rehabilitation facility  Demonstrates Need for Referral to Another Service:  (has been to [facility]; but really wants to go home with OPPT.)  PT Frequency: daily             Outcome Measures       03/18/17 0900 03/16/17 1400 03/15/17 1300    How much help from another person do you currently need...    Turning from your back to your side while in flat bed without using bedrails? 3  -SP 3  -CW 3  -EM    Moving from lying on back to sitting on the side of a flat bed without bedrails? 3  -SP 3  -CW 3  -EM    Moving to and from a bed to a chair (including a wheelchair)? 3  -SP 3  -CW 3  -EM    Standing up from a chair using your arms (e.g., wheelchair, bedside chair)? 3  -SP 3  -CW 3  -EM    Climbing 3-5 steps with a railing? 2  -SP 2  -CW 2  -EM    To walk in hospital room? 3  -SP 3  -CW 3  -EM    AM-PAC 6 Clicks Score 17  -SP 17  -CW 17  -EM    Functional Assessment    Outcome Measure Options AM-PAC 6 Clicks Basic Mobility (PT)  -SP AM-PAC 6 Clicks Basic Mobility (PT)  -CW       User Key  (r) = Recorded By, (t) = Taken By, (c) = Cosigned By    Initials Name Provider Type    EM Yeesnia Larsen, PT Physical Therapist    CW Colt Paniagua Physical Therapy Assistant    SP Sherron Pathak, PT Physical Therapist           Time Calculation:         PT Charges       03/18/17 0943          Time Calculation    Start Time 0915  -SP      Stop Time 0935  -SP      Time Calculation (min) 20 min  -SP        User Key  (r) = Recorded By, (t) = Taken By, (c) = Cosigned By    Initials Name Provider Type    SP Sherron Pathak, PT Physical Therapist          Therapy Charges  for Today     Code Description Service Date Service Provider Modifiers Qty    92399930575 HC PT THER PROC EA 15 MIN 3/18/2017 Sherron Pathak, PT GP 1          PT G-Codes  Outcome Measure Options: AM-PAC 6 Clicks Basic Mobility (PT)    Sherron Pathak, PT  3/18/2017

## 2017-03-18 NOTE — PLAN OF CARE
Problem: Inpatient Physical Therapy  Goal: Transfer Training Goal 1 LTG- PT  Outcome: Ongoing (interventions implemented as appropriate)

## 2017-03-18 NOTE — PLAN OF CARE
Problem: Patient Care Overview (Adult)  Goal: Plan of Care Review  Outcome: Ongoing (interventions implemented as appropriate)    03/18/17 0408   Coping/Psychosocial Response Interventions   Plan Of Care Reviewed With patient   Patient Care Overview   Progress improving   Outcome Evaluation   Outcome Summary/Follow up Plan pt ambulates well with standby assist with walker to BR, gait steady; conts to have bouts of loose stools; cont Cdiff precautions and PO vancomycin treatment; plan for d/c home with [facility] services; afib on monitor; VSS, NAD noted;        Goal: Adult Individualization and Mutuality  Outcome: Ongoing (interventions implemented as appropriate)  Goal: Discharge Needs Assessment  Outcome: Ongoing (interventions implemented as appropriate)    Problem: Fall Risk (Adult)  Goal: Absence of Falls  Outcome: Ongoing (interventions implemented as appropriate)

## 2017-03-18 NOTE — PLAN OF CARE
Problem: Patient Care Overview (Adult)  Goal: Plan of Care Review  Outcome: Ongoing (interventions implemented as appropriate)    03/18/17 1620   Coping/Psychosocial Response Interventions   Plan Of Care Reviewed With patient   Patient Care Overview   Progress improving   Outcome Evaluation   Outcome Summary/Follow up Plan continued to have several bm's today, up in chair most of day, plan to d/c Sunday or Monday with home health, remained in a-fib through out shift          Problem: Fall Risk (Adult)  Goal: Absence of Falls  Outcome: Ongoing (interventions implemented as appropriate)

## 2017-03-19 LAB
DEPRECATED RDW RBC AUTO: 55.2 FL (ref 37–54)
ERYTHROCYTE [DISTWIDTH] IN BLOOD BY AUTOMATED COUNT: 16 % (ref 11.7–13)
HCT VFR BLD AUTO: 30.6 % (ref 35.6–45.5)
HGB BLD-MCNC: 10 G/DL (ref 11.9–15.5)
MCH RBC QN AUTO: 31 PG (ref 26.9–32)
MCHC RBC AUTO-ENTMCNC: 32.7 G/DL (ref 32.4–36.3)
MCV RBC AUTO: 94.7 FL (ref 80.5–98.2)
PLATELET # BLD AUTO: 409 10*3/MM3 (ref 140–500)
PMV BLD AUTO: 9.4 FL (ref 6–12)
RBC # BLD AUTO: 3.23 10*6/MM3 (ref 3.9–5.2)
WBC NRBC COR # BLD: 9.23 10*3/MM3 (ref 4.5–10.7)

## 2017-03-19 PROCEDURE — 94799 UNLISTED PULMONARY SVC/PX: CPT

## 2017-03-19 PROCEDURE — 85027 COMPLETE CBC AUTOMATED: CPT | Performed by: INTERNAL MEDICINE

## 2017-03-19 RX ADMIN — BUDESONIDE AND FORMOTEROL FUMARATE DIHYDRATE 2 PUFF: 160; 4.5 AEROSOL RESPIRATORY (INHALATION) at 20:09

## 2017-03-19 RX ADMIN — BUDESONIDE AND FORMOTEROL FUMARATE DIHYDRATE 2 PUFF: 160; 4.5 AEROSOL RESPIRATORY (INHALATION) at 08:41

## 2017-03-19 RX ADMIN — LOSARTAN POTASSIUM 50 MG: 50 TABLET, FILM COATED ORAL at 09:26

## 2017-03-19 RX ADMIN — DABIGATRAN ETEXILATE MESYLATE 150 MG: 150 CAPSULE ORAL at 20:33

## 2017-03-19 RX ADMIN — PANTOPRAZOLE SODIUM 40 MG: 40 TABLET, DELAYED RELEASE ORAL at 05:59

## 2017-03-19 RX ADMIN — VANCOMYCIN 500 MG: KIT at 05:59

## 2017-03-19 RX ADMIN — ATORVASTATIN CALCIUM 40 MG: 40 TABLET, FILM COATED ORAL at 09:26

## 2017-03-19 RX ADMIN — VANCOMYCIN 500 MG: KIT at 00:11

## 2017-03-19 RX ADMIN — VANCOMYCIN 500 MG: KIT at 12:55

## 2017-03-19 RX ADMIN — DILTIAZEM HYDROCHLORIDE 360 MG: 180 CAPSULE, COATED, EXTENDED RELEASE ORAL at 09:26

## 2017-03-19 RX ADMIN — VANCOMYCIN 500 MG: KIT at 17:16

## 2017-03-19 RX ADMIN — VITAMIN D, TAB 1000IU (100/BT) 1000 UNITS: 25 TAB at 09:26

## 2017-03-19 RX ADMIN — LOSARTAN POTASSIUM 50 MG: 50 TABLET, FILM COATED ORAL at 20:33

## 2017-03-19 RX ADMIN — DABIGATRAN ETEXILATE MESYLATE 150 MG: 150 CAPSULE ORAL at 09:26

## 2017-03-19 NOTE — SIGNIFICANT NOTE
03/19/17 1531   Rehab Treatment   Discipline physical therapy assistant   Treatment Not Performed patient/family declined treatment  (Pt stated that she had been up earlier, she was tired and her legs were swollen; PT to follow up tomorrow.)   Recommendation   PT - Next Appointment 03/20/17

## 2017-03-19 NOTE — PLAN OF CARE
Problem: Patient Care Overview (Adult)  Goal: Plan of Care Review  Outcome: Ongoing (interventions implemented as appropriate)    03/19/17 1643   Coping/Psychosocial Response Interventions   Plan Of Care Reviewed With patient   Patient Care Overview   Progress improving   Outcome Evaluation   Outcome Summary/Follow up Plan had 2 very small bm's this am and one large loose bm this afternoon, chronic afib, up in chair most of the day,          Problem: Diarrhea (Adult)  Goal: Improved/Reduced Symptoms  Outcome: Ongoing (interventions implemented as appropriate)    Problem: Fall Risk (Adult)  Goal: Absence of Falls  Outcome: Ongoing (interventions implemented as appropriate)

## 2017-03-19 NOTE — PROGRESS NOTES
"DAILY PROGRESS NOTE  [facility]    Patient Identification:  Name: Agnieszka Rizzo  Age: 86 y.o.  Sex: female  :  1930  MRN: [id number]         Primary Care Physician: Gabe Horne MD    Subjective:  Interval History:Some diarrhea last night and cramping. No vomiting.    Objective:    Scheduled Meds:  atorvastatin 40 mg Oral Daily   budesonide-formoterol 2 puff Inhalation BID - RT   cholecalciferol 1,000 Units Oral Daily   dabigatran etexilate 150 mg Oral Q12H   diltiaZEM  mg Oral Q24H   losartan 50 mg Oral Q12H   pantoprazole 40 mg Oral Q AM   vancomycin 500 mg Oral Q6H     Continuous Infusions:     Vital signs in last 24 hours:  Temp:  [96.9 °F (36.1 °C)-97.7 °F (36.5 °C)] 97.7 °F (36.5 °C)  Heart Rate:  [] 96  Resp:  [16-20] 20  BP: (109-125)/(55-79) 109/69    Intake/Output:    Intake/Output Summary (Last 24 hours) at 17 1240  Last data filed at 17 0600   Gross per 24 hour   Intake    405 ml   Output      0 ml   Net    405 ml       Exam:  Visit Vitals   • /69 (BP Location: Left arm, Patient Position: Sitting)   • Pulse 96   • Temp 97.7 °F (36.5 °C) (Oral)   • Resp 20   • Ht 65\" (165.1 cm)   • Wt 127 lb 12.8 oz (58 kg)   • SpO2 98%   • BMI 21.27 kg/m2       General Appearance:    Alert, cooperative, no distress   Head:    Normocephalic, without obvious abnormality, atraumatic   Eyes:       Throat:   Lips, tongue, gums normal   Neck:   Supple, symmetrical, trachea midline, no JVD   Lungs:     Clear to auscultation bilaterally, respirations unlabored   Chest Wall:    No tenderness or deformity    Heart:    Regular rate and rhythm, S1 and S2 normal, no murmur,no  Rub or gallop   Abdomen:     Soft, distended, bowel sounds active, no masses, no organomegaly    Extremities:   Extremities normal, atraumatic, no cyanosis , 2 + leg  edema   Pulses:      Skin:   Skin is warm and dry,  no rashes or palpable lesions   Neurologic:   no focal deficits noted    "   [unfilled]  Data Review:    Results from last 7 days  Lab Units 03/18/17  0535 03/17/17  0640 03/16/17  0705 03/15/17  0629   SODIUM mmol/L 136  --  134* 136   POTASSIUM mmol/L 3.5  --  3.7 4.1   CHLORIDE mmol/L 101  --  100 103   TOTAL CO2 mmol/L 27.6  --  24.2 22.4   BUN mg/dL 6*  --  7* 11   CREATININE mg/dL 0.35* 0.49* 0.42* 0.45*   GLUCOSE mg/dL 101*  --  101* 99   CALCIUM mg/dL 7.9*  --  8.0* 8.5*       Results from last 7 days  Lab Units 03/19/17  0726 03/18/17  0535 03/17/17  0640   WBC 10*3/mm3 9.23 9.21 11.56*   HEMOGLOBIN g/dL 10.0* 9.6* 10.5*   HEMATOCRIT % 30.6* 30.4* 32.5*   PLATELETS 10*3/mm3 409 413 458               0  Lab Value Date/Time   TROPONINT <0.010 02/08/2017 0636   TROPONINT <0.010 01/06/2017 1857   TROPONINT <0.010 01/01/2017 1135   TROPONINT <0.010 11/19/2016 2102   TROPONINT <0.010 11/14/2016 1537   TROPONINT <0.010 11/12/2016 2117   TROPONINT <0.01 01/22/2016 1450           Results from last 7 days  Lab Units 03/18/17  0535 03/13/17  0540   ALK PHOS U/L 52 60   BILIRUBIN mg/dL 0.3 0.8   ALT (SGPT) U/L 14 15   AST (SGOT) U/L 19 24             No results found for: POCGLU        Patient Active Problem List   Diagnosis Code   • Pneumothorax of right lung after biopsy J95.811   • Pulmonary aspergillosis B44.1   • Benign essential hypertension I10   • Chronic coronary artery disease I25.10   • Hyperlipidemia E78.5   • Mitral valve insufficiency I34.0   • Ventricular premature beats I49.3   • Ventricular tachycardia I47.2   • Chronic atrial fibrillation I48.2   • Pneumonia J18.9   • Pneumonia with the fungal infection aspergillosis B44.9   • Acute respiratory failure with hypoxia J96.01   • Acid-fast bacteria present R89.9   • Hyponatremia E87.1   • C. difficile diarrhea A04.7   • DNR (do not resuscitate) Z66   • Sepsis A41.9   • Chronic diastolic CHF (congestive heart failure) I50.32       Assessment:  Principal Problem:    C. difficile diarrhea  Active Problems:    Benign essential  hypertension    Chronic coronary artery disease    Chronic atrial fibrillation    Hyponatremia    DNR (do not resuscitate)    Sepsis    Chronic diastolic CHF (congestive heart failure)      Plan:  Will have case management see if can get Vancomycin?? Maybe home tomorrow.    Bronson Lopez MD  3/19/2017  12:40 PM

## 2017-03-19 NOTE — PLAN OF CARE
Problem: Patient Care Overview (Adult)  Goal: Plan of Care Review  Outcome: Ongoing (interventions implemented as appropriate)    03/19/17 0335   Coping/Psychosocial Response Interventions   Plan Of Care Reviewed With patient   Patient Care Overview   Progress improving   Outcome Evaluation   Outcome Summary/Follow up Plan VSS, chronic afib on monitor;up ad mira with walker and standby assist; frequent loose/liquid stools; plan to d/c today and start [facility] this Tues; pt fears going home alone; enc family support system; po intake remains fair; await for d/c planning       Goal: Adult Individualization and Mutuality  Outcome: Ongoing (interventions implemented as appropriate)  Goal: Discharge Needs Assessment  Outcome: Ongoing (interventions implemented as appropriate)    Problem: Diarrhea (Adult)  Goal: Improved/Reduced Symptoms  Outcome: Ongoing (interventions implemented as appropriate)    Problem: Fall Risk (Adult)  Goal: Absence of Falls  Outcome: Ongoing (interventions implemented as appropriate)

## 2017-03-20 VITALS
DIASTOLIC BLOOD PRESSURE: 70 MMHG | BODY MASS INDEX: 21.3 KG/M2 | RESPIRATION RATE: 18 BRPM | SYSTOLIC BLOOD PRESSURE: 114 MMHG | HEART RATE: 98 BPM | HEIGHT: 65 IN | WEIGHT: 127.87 LBS | TEMPERATURE: 98.2 F | OXYGEN SATURATION: 98 %

## 2017-03-20 LAB
ANION GAP SERPL CALCULATED.3IONS-SCNC: 10.7 MMOL/L
BASOPHILS # BLD AUTO: 0.07 10*3/MM3 (ref 0–0.2)
BASOPHILS NFR BLD AUTO: 0.6 % (ref 0–1.5)
BUN BLD-MCNC: 7 MG/DL (ref 8–23)
BUN/CREAT SERPL: 15.2 (ref 7–25)
BURR CELLS BLD QL SMEAR: NORMAL
CALCIUM SPEC-SCNC: 8.1 MG/DL (ref 8.6–10.5)
CHLORIDE SERPL-SCNC: 99 MMOL/L (ref 98–107)
CLUMPED PLATELETS: PRESENT
CO2 SERPL-SCNC: 26.3 MMOL/L (ref 22–29)
CREAT BLD-MCNC: 0.46 MG/DL (ref 0.57–1)
DEPRECATED RDW RBC AUTO: 55.4 FL (ref 37–54)
EOSINOPHIL # BLD AUTO: 0.39 10*3/MM3 (ref 0–0.7)
EOSINOPHIL NFR BLD AUTO: 3.5 % (ref 0.3–6.2)
ERYTHROCYTE [DISTWIDTH] IN BLOOD BY AUTOMATED COUNT: 16.1 % (ref 11.7–13)
GFR SERPL CREATININE-BSD FRML MDRD: 129 ML/MIN/1.73
GLUCOSE BLD-MCNC: 97 MG/DL (ref 65–99)
HCT VFR BLD AUTO: 30.4 % (ref 35.6–45.5)
HGB BLD-MCNC: 9.9 G/DL (ref 11.9–15.5)
IMM GRANULOCYTES # BLD: 0.11 10*3/MM3 (ref 0–0.03)
IMM GRANULOCYTES NFR BLD: 1 % (ref 0–0.5)
LYMPHOCYTES # BLD AUTO: 1.34 10*3/MM3 (ref 0.9–4.8)
LYMPHOCYTES NFR BLD AUTO: 12 % (ref 19.6–45.3)
MCH RBC QN AUTO: 30.7 PG (ref 26.9–32)
MCHC RBC AUTO-ENTMCNC: 32.6 G/DL (ref 32.4–36.3)
MCV RBC AUTO: 94.4 FL (ref 80.5–98.2)
MONOCYTES # BLD AUTO: 1.01 10*3/MM3 (ref 0.2–1.2)
MONOCYTES NFR BLD AUTO: 9.1 % (ref 5–12)
NEUTROPHILS # BLD AUTO: 8.22 10*3/MM3 (ref 1.9–8.1)
NEUTROPHILS NFR BLD AUTO: 73.8 % (ref 42.7–76)
NRBC BLD MANUAL-RTO: 0.2 /100 WBC (ref 0–0)
PLATELET # BLD AUTO: 367 10*3/MM3 (ref 140–500)
PMV BLD AUTO: 10.3 FL (ref 6–12)
POTASSIUM BLD-SCNC: 4.5 MMOL/L (ref 3.5–5.2)
RBC # BLD AUTO: 3.22 10*6/MM3 (ref 3.9–5.2)
SODIUM BLD-SCNC: 136 MMOL/L (ref 136–145)
WBC MORPH BLD: NORMAL
WBC NRBC COR # BLD: 11.14 10*3/MM3 (ref 4.5–10.7)

## 2017-03-20 PROCEDURE — 85007 BL SMEAR W/DIFF WBC COUNT: CPT | Performed by: HOSPITALIST

## 2017-03-20 PROCEDURE — 80048 BASIC METABOLIC PNL TOTAL CA: CPT | Performed by: HOSPITALIST

## 2017-03-20 PROCEDURE — 99232 SBSQ HOSP IP/OBS MODERATE 35: CPT | Performed by: INTERNAL MEDICINE

## 2017-03-20 PROCEDURE — 94799 UNLISTED PULMONARY SVC/PX: CPT

## 2017-03-20 PROCEDURE — 85025 COMPLETE CBC W/AUTO DIFF WBC: CPT | Performed by: HOSPITALIST

## 2017-03-20 RX ADMIN — DABIGATRAN ETEXILATE MESYLATE 150 MG: 150 CAPSULE ORAL at 08:34

## 2017-03-20 RX ADMIN — VANCOMYCIN 500 MG: KIT at 06:09

## 2017-03-20 RX ADMIN — VANCOMYCIN 500 MG: KIT at 00:26

## 2017-03-20 RX ADMIN — DILTIAZEM HYDROCHLORIDE 360 MG: 180 CAPSULE, COATED, EXTENDED RELEASE ORAL at 08:34

## 2017-03-20 RX ADMIN — ATORVASTATIN CALCIUM 40 MG: 40 TABLET, FILM COATED ORAL at 08:34

## 2017-03-20 RX ADMIN — LOSARTAN POTASSIUM 50 MG: 50 TABLET, FILM COATED ORAL at 08:34

## 2017-03-20 RX ADMIN — VANCOMYCIN 500 MG: KIT at 11:54

## 2017-03-20 RX ADMIN — PANTOPRAZOLE SODIUM 40 MG: 40 TABLET, DELAYED RELEASE ORAL at 06:09

## 2017-03-20 RX ADMIN — VITAMIN D, TAB 1000IU (100/BT) 1000 UNITS: 25 TAB at 08:34

## 2017-03-20 RX ADMIN — BUDESONIDE AND FORMOTEROL FUMARATE DIHYDRATE 2 PUFF: 160; 4.5 AEROSOL RESPIRATORY (INHALATION) at 08:56

## 2017-03-20 NOTE — CONSULTS
"Adult Nutrition  Assessment/PES    Patient Name:  Agnieszka Rizzo  YOB: 1930  MRN: [id number]  Admit Date:  3/11/2017    Assessment Date:  3/20/2017        Reason for Assessment       03/20/17 1504    Reason for Assessment    Reason For Assessment/Visit follow up protocol;education                Anthropometrics       03/20/17 1504    Anthropometrics    Height 165.1 cm (65\")    Weight 58 kg (127 lb 13.9 oz)    Ideal Body Weight (IBW)    Ideal Body Weight (IBW), Female 57.69    % Ideal Body Weight 100.75    Body Mass Index (BMI)    BMI (kg/m2) 21.32    BMI Grade 19.1 - 24.9 - normal            Labs/Tests/Procedures/Meds       03/20/17 1505    Labs/Tests/Procedures/Meds    Diagnostic Test/Procedure Review reviewed    Labs/Tests Review Reviewed    Medication Review Reviewed, pertinent    Significant Vitals reviewed            Physical Findings       03/20/17 1505    Physical Findings/Assessment    Additional Documentation Physical Appearance (Group)    Physical Appearance    Gastrointestinal --   BMs are becoming more formed    Skin --   B=19              Nutrition Prescription Ordered       03/20/17 1505    Nutrition Prescription PO    Current PO Diet Regular    Fluid Consistency Thin    Supplement PRO powder    Supplement Frequency 3 times a day    Common Modifiers GI Soft/[place];Lactose Restricted            Evaluation of Received Nutrient/Fluid Intake       03/20/17 1506    PO Evaluation    Number of Meals 1    % PO Intake 75   at lunch today              Problem/Interventions:                  Intervention Goal       03/20/17 1506    Intervention Goal    General Maintain nutrition    PO Maintain intake    Weight Maintain weight            Nutrition Intervention       03/20/17 1506    Nutrition Intervention    RD/Tech Action Follow Tx progress;Care plan reviewd;Encourage intake              Education/Evaluation       03/20/17 1507    Education    Education Provided education regarding    Education " Topics Fiber;lactose modified    Monitor/Evaluation    Monitor Per protocol;PO intake;Supplement intake;Weight;Symptoms    Education Follow-up Reinforce PRN        Comments:  RD saw patient Friday and assessed.  Visited patient today and provided GI Soft and Lactose Restricted diet education.  Ate 75% of lunch today.  Patient being d/c'ed today.    Electronically signed by:  Marlen Guzman RD  03/20/17 3:08 PM

## 2017-03-20 NOTE — PROGRESS NOTES
Discharge Planning Assessment  [facility]     Patient Name: Agnieszka Rizzo  MRN: [id number]  Today's Date: 3/20/2017    Admit Date: 3/11/2017          Discharge Needs Assessment     None            Discharge Plan       03/20/17 1046    Case Management/Social Work Plan    Plan Home with help from family and caregivers     Additional Comments Spoke with Dr Landry, he asked CCP to check pricing for PO Vanc. Called and spoke with Maylin with [facility], prescription faxed to [phone] per her request. Pts cost would be $112.36. Updated pt, she is agreeable. Pt given the number to call  and pay for medicine over the phone with her credit card. [facility] to deliver medicine to the pts room. CCP to follow.         Discharge Placement     No information found        Expected Discharge Date and Time     Expected Discharge Date Expected Discharge Time    Mar 20, 2017               Demographic Summary     None            Functional Status     None            Psychosocial     None            Abuse/Neglect     None            Legal     None            Substance Abuse     None            Patient Forms     None          Marcie Siddiqui RN

## 2017-03-20 NOTE — PLAN OF CARE
Problem: Inpatient Physical Therapy  Goal: Bed Mobility Goal LTG- PT  Outcome: Outcome(s) achieved Date Met:  03/20/17 03/20/17 1109   Bed Mobility PT LTG   Bed Mobility PT LTG, Outcome goal met       Goal: Transfer Training Goal 1 LTG- PT  Outcome: Outcome(s) achieved Date Met:  03/20/17 03/20/17 1109   Transfer Training PT LTG   Transfer Training PT LTG, Outcome goal met       Goal: Gait Training Goal LTG- PT  Outcome: Unable to achieve outcome(s) by discharge Date Met:  03/20/17 03/20/17 1109   Gait Training PT LTG   Gait Training Goal PT LTG, Outcome goal not met   Gait Training Goal PT LTG, Reason Goal Not Met discharged from facility

## 2017-03-20 NOTE — PLAN OF CARE
Problem: Patient Care Overview (Adult)  Goal: Plan of Care Review  Outcome: Ongoing (interventions implemented as appropriate)    03/20/17 1929   Coping/Psychosocial Response Interventions   Plan Of Care Reviewed With patient   Patient Care Overview   Progress improving   Outcome Evaluation   Outcome Summary/Follow up Plan pt states still with freq stools, more solid today, denies any pain, to go home this afternoon on po vanc       Goal: Adult Individualization and Mutuality  Outcome: Ongoing (interventions implemented as appropriate)    Problem: Diarrhea (Adult)  Goal: Improved/Reduced Symptoms  Outcome: Ongoing (interventions implemented as appropriate)    Problem: Fall Risk (Adult)  Goal: Absence of Falls  Outcome: Ongoing (interventions implemented as appropriate)

## 2017-03-20 NOTE — PLAN OF CARE
Problem: Patient Care Overview (Adult)  Goal: Plan of Care Review  Outcome: Ongoing (interventions implemented as appropriate)    03/20/17 0404   Coping/Psychosocial Response Interventions   Plan Of Care Reviewed With patient   Outcome Evaluation   Outcome Summary/Follow up Plan Still having diarrhea. Chronic Afib. No complaints of pain. Fall precautions enforced. Possible discharge in the am. Monitored.       Goal: Adult Individualization and Mutuality  Outcome: Ongoing (interventions implemented as appropriate)  Goal: Discharge Needs Assessment  Outcome: Ongoing (interventions implemented as appropriate)    Problem: Diarrhea (Adult)  Goal: Improved/Reduced Symptoms  Outcome: Ongoing (interventions implemented as appropriate)    Problem: Fall Risk (Adult)  Goal: Absence of Falls  Outcome: Ongoing (interventions implemented as appropriate)

## 2017-03-20 NOTE — PROGRESS NOTES
LOS: 9 days     Chief Complaint:  Follow-up C diff    Interval History:  She reports few brown BMs this morning. She still has some diffuse cramping abdominal pain and distension but certainly much better than at admission. She is asking about DC soon.    ROS: no rash, no vomiting, no SOA    Vital Signs  Temp:  [97.8 °F (36.6 °C)-98.4 °F (36.9 °C)] 98.4 °F (36.9 °C)  Heart Rate:  [88-96] 96  Resp:  [16-20] 16  BP: (101-118)/(59-82) 111/66    Physical Exam:  General: awake, alert, sitting in chair  Head: Normocephalic  Eyes: no scleral icterus  ENT: MMM, OP clear, no thrush. Fair dentition.   Neck: Supple  Cardiovascular: NR, 2+ pitting LE edema  Respiratory:  normal work of breathing on RA  GI: Abdomen is moderately firm and distended, distant bowel sounds in all four quadrants  : no Long catheter present  Musculoskeletal: no joint abnormalities, normal musculature  Skin: No rashes, lesions, or embolic phenomenon  Neurological: Alert and oriented x 3,  motor strength 5/5 in all four extremities  Psychiatric: Normal mood and affect   Vasc: no cyanosis; PIV w/o erythema    Antibiotics:  •  vancomycin oral solution 500 mg, 500 mg, Oral, Q6H, Hayden Landry MD, 500 mg at 03/13/17 0620    LABS:  CBC, Crt reviewed today  Lab Results   Component Value Date    WBC 11.14 (H) 03/20/2017    HGB 9.9 (L) 03/20/2017    HCT 30.4 (L) 03/20/2017    MCV 94.4 03/20/2017     03/20/2017     Lab Results   Component Value Date    CREATININE 0.46 (L) 03/20/2017     3/11 C diff +  LA 1  Preal 8.9     Microbiology:  3/11 BCx: negative     Radiology (personally reviewed images/report):  KUB w/ gas; no obstruction    Assessment/Plan   1. Recurrent C difficile colitis  -continue vancomycin to 500 mg PO q6h; plan 14 day course through 3/25/17  -RX given to CCP  -start probiotic Kefir after vanco course is complete  -I think she needs outpatient GI care as she has what seems to be IBS-type symptoms; I think her BMs are  going to abnormal for some time even after C diff has been treated     2. 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 Hyponatremia  4. Afib with RVR - RVR resolved. Cardiology treating.  5. LE edema -  Albumin 2.1 and Prealbumin 8.9.     Thank you for allowing me to be involved in the care of this patient. Infectious diseases will sign off at this time with antibiotics plan in place but please call me at [phone] if any further questions.

## 2017-03-20 NOTE — THERAPY DISCHARGE NOTE
Acute Care - Physical Therapy Treatment Note/Discharge  [facility]     Patient Name: Agnieszka Rizzo  : 1930  MRN: [id number]  Today's Date: 3/20/2017     Date of Referral to PT: 17       Admit Date: 3/11/2017    Visit Dx:    ICD-10-CM ICD-9-CM   1. C. difficile colitis A04.7 008.45   2. Atrial fibrillation with RVR I48.91 427.31   3. Weakness generalized R53.1 780.79     Patient Active Problem List   Diagnosis   • Pneumothorax of right lung after biopsy   • Pulmonary aspergillosis   • Benign essential hypertension   • Chronic coronary artery disease   • Hyperlipidemia   • Mitral valve insufficiency   • Ventricular premature beats   • Ventricular tachycardia   • Chronic atrial fibrillation   • Pneumonia   • Pneumonia with the fungal infection aspergillosis   • Acute respiratory failure with hypoxia   • Acid-fast bacteria present   • Hyponatremia   • C. difficile diarrhea   • DNR (do not resuscitate)   • Sepsis   • Chronic diastolic CHF (congestive heart failure)       Physical Therapy Education     Title: PT OT SLP Therapies (Resolved)     Topic: Physical Therapy (Resolved)     Point: Mobility training (Resolved)    Learning Progress Summary    Learner Readiness Method Response Comment Documented by Status   Patient Acceptance E VU  SP 17 0939 Done    Acceptance E,TB DU,VU  CW 17 1406 Done    Acceptance E VU  EM 03/15/17 1328 Done    Acceptance E VU  AL 17 1613 Done               Point: Home exercise program (Resolved)    Learning Progress Summary    Learner Readiness Method Response Comment Documented by Status   Patient Acceptance E,TB DU,VU  CW 17 1406 Done               Point: Body mechanics (Resolved)    Learning Progress Summary    Learner Readiness Method Response Comment Documented by Status   Patient Acceptance E,TB DU,VU  CW 17 1406 Done    Acceptance E VU  AL 17 1613 Done               Point: Precautions (Resolved)    Learning Progress Summary    Learner  Readiness Method Response Comment Documented by Status   Patient Acceptance E VU  SP 03/18/17 0939 Done    Acceptance E,TB DU,VU  CW 03/16/17 1406 Done    Acceptance E VU  AL 03/12/17 1613 Done                      User Key     Initials Effective Dates Name Provider Type Discipline    AL 12/01/15 -  Sunitha Khan, PT Physical Therapist PT    EM 12/01/15 -  Yesenia Larsen, PT Physical Therapist PT    CW 12/13/16 -  Colt Paniagua Physical Therapy Assistant PT    SP 01/09/17 -  Sherron Pathak, PT Physical Therapist PT                    IP PT Goals       03/20/17 1109 03/18/17 0941 03/12/17 1615    Bed Mobility PT LTG    Bed Mobility PT LTG, Date Established   03/12/17  -AL    Bed Mobility PT LTG, Time to Achieve   1 wk  -AL    Bed Mobility PT LTG, Activity Type   all bed mobility  -AL    Bed Mobility PT LTG, Cascade Level   independent  -AL    Bed Mobility PT LTG, Outcome goal met  -CW      Transfer Training PT LTG    Transfer Training PT LTG, Date Established   03/12/17  -AL    Transfer Training PT LTG, Time to Achieve   1 wk  -AL    Transfer Training PT LTG, Activity Type   sit to stand/stand to sit  -AL    Transfer Training PT LTG, [place] Level   conditional independence  -AL    Transfer Training PT LTG, Assist Device   walker, rolling  -AL    Transfer Training PT LTG, Outcome goal met  -CW      Gait Training PT LTG    Gait Training Goal PT LTG, Date Established  03/18/17  -SP 03/12/17  -AL    Gait Training Goal PT LTG, Time to Achieve  by discharge  -SP 1 wk  -AL    Gait Training Goal PT LTG, [place] Level  conditional independence  -SP supervision required  -AL    Gait Training Goal PT LTG, Assist Device   walker, rolling  -AL    Gait Training Goal PT LTG, Distance to Achieve  150  -  -AL    Gait Training Goal PT LTG, Outcome goal not met  -CW      Gait Training Goal PT LTG, Reason Goal Not Met discharged from facility  -CW        User Key  (r) = Recorded By, (t) = Taken By, (c) =  Cosigned By    Initials Name Provider Type    ANDERSON Khan, PT Physical Therapist    JOSE CARLOS Paniagua Physical Therapy Assistant    SP Sherron Pathak, PT Physical Therapist              Adult Rehabilitation Note       03/18/17 0900          Rehab Assessment/Intervention    Discipline physical therapist  -SP      Subjective Information no complaints;agree to therapy  -SP      Patient Effort, Rehab Treatment good  -SP      Precautions/Limitations fall precautions;other (see comments)   contact   -SP      Recorded by [SP] Sherron Pathak PT      Vital Signs    Pre SpO2 (%) 95  -SP      O2 Delivery Pre Treatment room air  -SP      Recorded by [SP] Sherron Pathak PT      Pain Assessment    Pain Assessment No/denies pain  -SP      Recorded by [SP] Sherron Pathak PT      Cognitive Assessment/Intervention    Orientation Status oriented x 4  -SP      Follows Commands/Answers Questions 100% of the time  -SP      Personal Safety good awareness, safety precautions  -SP      Personal Safety Interventions gait belt;fall prevention program maintained;nonskid shoes/slippers when out of bed   Pt seated in bathroom on toilet with call bell, to call out   -SP      Recorded by [SP] Sherron Pathak, PT      ROM (Range of Motion)    General ROM Detail tight ankles due to edema   -SP      Recorded by [SP] Sherron Pathak PT      Transfer Assessment/Treatment    Transfers, Sit-Stand [place] supervision required;set up required  -SP      Transfers, Stand-Sit [place] supervision required  -SP      Transfers, Sit-Stand-Sit, Assist Device standard walker  -SP      Recorded by [SP] Sherron Pathak, PT      Gait Assessment/Treatment    Gait, [place] Level set up required;supervision required  -SP      Gait, Assistive Device standard walker   appropriate for roll wx, has at home   -SP      Gait, Distance (Feet) 150  -SP      Gait, Gait Pattern Analysis 2-point gait  -SP      Gait, Gait Deviations cecilia decreased   -SP      Recorded by [SP] Sherron Pathak PT      Positioning and Restraints    Pre-Treatment Position sitting in chair/recliner  -SP      Post Treatment Position bathroom  -SP      Bathroom notified nsg;call light within reach;encouraged to call for assist  -SP      Restraints notified nsg:  -SP      Recorded by [SP] Sherron Pathak PT        User Key  (r) = Recorded By, (t) = Taken By, (c) = Cosigned By    Initials Name Effective Dates    TARSHA Pathak, PT 01/09/17 -           PT Recommendation and Plan  Anticipated Equipment Needs At Discharge: front wheeled walker  Anticipated Discharge Disposition: home with home health, inpatient rehabilitation facility  Demonstrates Need for Referral to Another Service:  (has been to [facility]; but really wants to go home with OPPT.)  PT Frequency: daily  Plan of Care Review  Plan Of Care Reviewed With: patient  Progress: progress toward functional goals as expected  Outcome Summary/Follow up Plan: Pt is increasing with amb safety and distance with use of RWX and CGA          Outcome Measures       03/18/17 0900          How much help from another person do you currently need...    Turning from your back to your side while in flat bed without using bedrails? 3  -SP      Moving from lying on back to sitting on the side of a flat bed without bedrails? 3  -SP      Moving to and from a bed to a chair (including a wheelchair)? 3  -SP      Standing up from a chair using your arms (e.g., wheelchair, bedside chair)? 3  -SP      Climbing 3-5 steps with a railing? 2  -SP      To walk in hospital room? 3  -SP      AM-PAC 6 Clicks Score 17  -SP      Functional Assessment    Outcome Measure Options AM-PAC 6 Clicks Basic Mobility (PT)  -SP        User Key  (r) = Recorded By, (t) = Taken By, (c) = Cosigned By    Initials Name Provider Type    TARSHA Pathak PT Physical Therapist           Time Calculation:           PT G-Codes  Outcome Measure Options: AM-PAC 6 Clicks Basic  Mobility (PT)    PT Discharge Summary  Reason for Discharge: Discharge from facility  Outcomes Achieved: Refer to plan of care for updates on goals achieved  Discharge Destination: Home with home health    Colt Paniagua  3/20/2017

## 2017-03-22 ENCOUNTER — HOSPITAL ENCOUNTER (OUTPATIENT)
Facility: HOSPITAL | Age: 82
Setting detail: OBSERVATION
Discharge: HOME | End: 2017-03-24
Attending: INTERNAL MEDICINE | Admitting: INTERNAL MEDICINE

## 2017-03-22 ENCOUNTER — TELEPHONE (OUTPATIENT)
Dept: CARDIOLOGY | Facility: CLINIC | Age: 82
End: 2017-03-22

## 2017-03-22 PROBLEM — I50.9 CHF (CONGESTIVE HEART FAILURE) (HCC): Status: ACTIVE | Noted: 2017-03-22

## 2017-03-22 LAB
ALBUMIN SERPL-MCNC: 2.8 G/DL (ref 3.5–5.2)
ALBUMIN/GLOB SERPL: 1.1 G/DL
ALP SERPL-CCNC: 60 U/L (ref 39–117)
ALT SERPL W P-5'-P-CCNC: 21 U/L (ref 1–33)
ANION GAP SERPL CALCULATED.3IONS-SCNC: 8.5 MMOL/L
AST SERPL-CCNC: 25 U/L (ref 1–32)
BILIRUB SERPL-MCNC: 0.4 MG/DL (ref 0.1–1.2)
BUN BLD-MCNC: 5 MG/DL (ref 8–23)
BUN/CREAT SERPL: 11.4 (ref 7–25)
CALCIUM SPEC-SCNC: 8.8 MG/DL (ref 8.6–10.5)
CHLORIDE SERPL-SCNC: 97 MMOL/L (ref 98–107)
CO2 SERPL-SCNC: 28.5 MMOL/L (ref 22–29)
CREAT BLD-MCNC: 0.44 MG/DL (ref 0.57–1)
DEPRECATED RDW RBC AUTO: 55.9 FL (ref 37–54)
ERYTHROCYTE [DISTWIDTH] IN BLOOD BY AUTOMATED COUNT: 16.5 % (ref 11.7–13)
GFR SERPL CREATININE-BSD FRML MDRD: 136 ML/MIN/1.73
GLOBULIN UR ELPH-MCNC: 2.6 GM/DL
GLUCOSE BLD-MCNC: 94 MG/DL (ref 65–99)
HCT VFR BLD AUTO: 29.3 % (ref 35.6–45.5)
HGB BLD-MCNC: 9.2 G/DL (ref 11.9–15.5)
MAGNESIUM SERPL-MCNC: 1.7 MG/DL (ref 1.6–2.4)
MCH RBC QN AUTO: 29.7 PG (ref 26.9–32)
MCHC RBC AUTO-ENTMCNC: 31.4 G/DL (ref 32.4–36.3)
MCV RBC AUTO: 94.5 FL (ref 80.5–98.2)
PLATELET # BLD AUTO: 374 10*3/MM3 (ref 140–500)
PMV BLD AUTO: 9.4 FL (ref 6–12)
POTASSIUM BLD-SCNC: 5.5 MMOL/L (ref 3.5–5.2)
PROT SERPL-MCNC: 5.4 G/DL (ref 6–8.5)
RBC # BLD AUTO: 3.1 10*6/MM3 (ref 3.9–5.2)
SODIUM BLD-SCNC: 134 MMOL/L (ref 136–145)
WBC NRBC COR # BLD: 7.73 10*3/MM3 (ref 4.5–10.7)

## 2017-03-22 PROCEDURE — 83735 ASSAY OF MAGNESIUM: CPT | Performed by: INTERNAL MEDICINE

## 2017-03-22 PROCEDURE — 25010000002 FUROSEMIDE PER 20 MG: Performed by: INTERNAL MEDICINE

## 2017-03-22 PROCEDURE — G0378 HOSPITAL OBSERVATION PER HR: HCPCS

## 2017-03-22 PROCEDURE — 94640 AIRWAY INHALATION TREATMENT: CPT

## 2017-03-22 PROCEDURE — 80053 COMPREHEN METABOLIC PANEL: CPT | Performed by: INTERNAL MEDICINE

## 2017-03-22 PROCEDURE — 85027 COMPLETE CBC AUTOMATED: CPT | Performed by: INTERNAL MEDICINE

## 2017-03-22 PROCEDURE — 96375 TX/PRO/DX INJ NEW DRUG ADDON: CPT

## 2017-03-22 RX ORDER — LOSARTAN POTASSIUM 50 MG/1
50 TABLET ORAL
Status: DISCONTINUED | OUTPATIENT
Start: 2017-03-22 | End: 2017-03-24 | Stop reason: HOSPADM

## 2017-03-22 RX ORDER — ATORVASTATIN CALCIUM 40 MG/1
40 TABLET, FILM COATED ORAL DAILY
Status: DISCONTINUED | OUTPATIENT
Start: 2017-03-22 | End: 2017-03-24 | Stop reason: HOSPADM

## 2017-03-22 RX ORDER — POTASSIUM CHLORIDE 1.5 G/1.77G
40 POWDER, FOR SOLUTION ORAL 2 TIMES DAILY
Status: DISCONTINUED | OUTPATIENT
Start: 2017-03-22 | End: 2017-03-22 | Stop reason: CLARIF

## 2017-03-22 RX ORDER — POTASSIUM CHLORIDE 750 MG/1
40 CAPSULE, EXTENDED RELEASE ORAL EVERY 12 HOURS SCHEDULED
Status: DISCONTINUED | OUTPATIENT
Start: 2017-03-22 | End: 2017-03-24 | Stop reason: HOSPADM

## 2017-03-22 RX ORDER — ACETAMINOPHEN 325 MG/1
650 TABLET ORAL EVERY 4 HOURS PRN
Status: DISCONTINUED | OUTPATIENT
Start: 2017-03-22 | End: 2017-03-24 | Stop reason: HOSPADM

## 2017-03-22 RX ORDER — DILTIAZEM HYDROCHLORIDE 240 MG/1
240 CAPSULE, COATED, EXTENDED RELEASE ORAL DAILY
Status: DISCONTINUED | OUTPATIENT
Start: 2017-03-22 | End: 2017-03-23

## 2017-03-22 RX ORDER — PANTOPRAZOLE SODIUM 40 MG/1
40 TABLET, DELAYED RELEASE ORAL
Status: DISCONTINUED | OUTPATIENT
Start: 2017-03-23 | End: 2017-03-24 | Stop reason: HOSPADM

## 2017-03-22 RX ORDER — DABIGATRAN ETEXILATE 150 MG/1
150 CAPSULE ORAL EVERY 12 HOURS SCHEDULED
Status: DISCONTINUED | OUTPATIENT
Start: 2017-03-22 | End: 2017-03-24 | Stop reason: HOSPADM

## 2017-03-22 RX ORDER — ACETYLCYSTEINE 200 MG/ML
4 SOLUTION ORAL; RESPIRATORY (INHALATION)
Status: DISCONTINUED | OUTPATIENT
Start: 2017-03-22 | End: 2017-03-24 | Stop reason: HOSPADM

## 2017-03-22 RX ORDER — SODIUM CHLORIDE 0.9 % (FLUSH) 0.9 %
1-10 SYRINGE (ML) INJECTION AS NEEDED
Status: DISCONTINUED | OUTPATIENT
Start: 2017-03-22 | End: 2017-03-24 | Stop reason: HOSPADM

## 2017-03-22 RX ORDER — NITROGLYCERIN 0.4 MG/1
0.4 TABLET SUBLINGUAL
Status: DISCONTINUED | OUTPATIENT
Start: 2017-03-22 | End: 2017-03-24 | Stop reason: HOSPADM

## 2017-03-22 RX ORDER — FUROSEMIDE 10 MG/ML
40 INJECTION INTRAMUSCULAR; INTRAVENOUS 3 TIMES DAILY
Status: DISCONTINUED | OUTPATIENT
Start: 2017-03-22 | End: 2017-03-23

## 2017-03-22 RX ORDER — LORAZEPAM 1 MG/1
1 TABLET ORAL EVERY 6 HOURS PRN
Status: DISCONTINUED | OUTPATIENT
Start: 2017-03-22 | End: 2017-03-24 | Stop reason: HOSPADM

## 2017-03-22 RX ORDER — BUDESONIDE AND FORMOTEROL FUMARATE DIHYDRATE 80; 4.5 UG/1; UG/1
2 AEROSOL RESPIRATORY (INHALATION)
Status: DISCONTINUED | OUTPATIENT
Start: 2017-03-22 | End: 2017-03-24 | Stop reason: HOSPADM

## 2017-03-22 RX ADMIN — POTASSIUM CHLORIDE 40 MEQ: 750 CAPSULE, EXTENDED RELEASE ORAL at 21:22

## 2017-03-22 RX ADMIN — FUROSEMIDE 40 MG: 10 INJECTION, SOLUTION INTRAMUSCULAR; INTRAVENOUS at 18:02

## 2017-03-22 RX ADMIN — VANCOMYCIN 500 MG: KIT at 21:22

## 2017-03-22 RX ADMIN — LOSARTAN POTASSIUM 50 MG: 50 TABLET, FILM COATED ORAL at 21:21

## 2017-03-22 RX ADMIN — DABIGATRAN ETEXILATE MESYLATE 150 MG: 150 CAPSULE ORAL at 21:21

## 2017-03-22 RX ADMIN — BUDESONIDE AND FORMOTEROL FUMARATE DIHYDRATE 2 PUFF: 80; 4.5 AEROSOL RESPIRATORY (INHALATION) at 20:51

## 2017-03-22 RX ADMIN — ATORVASTATIN CALCIUM 40 MG: 80 TABLET, FILM COATED ORAL at 21:21

## 2017-03-22 NOTE — PLAN OF CARE
Problem: Patient Care Overview (Adult)  Goal: Plan of Care Review  Outcome: Ongoing (interventions implemented as appropriate)    03/22/17 3459   Coping/Psychosocial Response Interventions   Plan Of Care Reviewed With patient   Patient Care Overview   Progress no change   Outcome Evaluation   Outcome Summary/Follow up Plan pt admitted for CHF. plan is to treat pt with IV diuretic therapy.       Goal: Adult Individualization and Mutuality  Outcome: Ongoing (interventions implemented as appropriate)  Goal: Discharge Needs Assessment  Outcome: Ongoing (interventions implemented as appropriate)    Problem: Cardiac Output, Decreased (Adult)  Goal: Identify Related Risk Factors and Signs and Symptoms  Outcome: Ongoing (interventions implemented as appropriate)  Goal: Adequate Cardiac Output/Effective Tissue Perfusion  Outcome: Ongoing (interventions implemented as appropriate)

## 2017-03-22 NOTE — TELEPHONE ENCOUNTER
Confirm she is taking Lasix 20 mg twice a day.  If so, double this to 40 mg twice a day and to this throughout the weekend.  If still having severe edema call on Monday

## 2017-03-22 NOTE — TELEPHONE ENCOUNTER
Pt reports that she has just gotten home after being treated at [facility] for c-diff. She is due to come off of antibiotic on Saturday, she has not had any missed doses or dose change to diuretic. Reports bilateral swelling in legs unable to bend knee. Do you want anything added or changed? Please advise. No other symptoms.    [phone]

## 2017-03-22 NOTE — TELEPHONE ENCOUNTER
Pt is agreeable to come in and have IV diuresis. Nohelia is aware and will contact the Pt as soon as she knows where she will be placed. Pt states abdominal swelling, increased weight from 110-115 to 129 today.

## 2017-03-23 LAB
ANION GAP SERPL CALCULATED.3IONS-SCNC: 9.1 MMOL/L
BUN BLD-MCNC: 3 MG/DL (ref 8–23)
BUN/CREAT SERPL: 7.3 (ref 7–25)
CALCIUM SPEC-SCNC: 8.9 MG/DL (ref 8.6–10.5)
CHLORIDE SERPL-SCNC: 98 MMOL/L (ref 98–107)
CO2 SERPL-SCNC: 29.9 MMOL/L (ref 22–29)
CREAT BLD-MCNC: 0.41 MG/DL (ref 0.57–1)
GFR SERPL CREATININE-BSD FRML MDRD: 147 ML/MIN/1.73
GLUCOSE BLD-MCNC: 87 MG/DL (ref 65–99)
MAGNESIUM SERPL-MCNC: 1.7 MG/DL (ref 1.6–2.4)
POTASSIUM BLD-SCNC: 4.5 MMOL/L (ref 3.5–5.2)
SODIUM BLD-SCNC: 137 MMOL/L (ref 136–145)

## 2017-03-23 PROCEDURE — 94799 UNLISTED PULMONARY SVC/PX: CPT

## 2017-03-23 PROCEDURE — 80048 BASIC METABOLIC PNL TOTAL CA: CPT | Performed by: INTERNAL MEDICINE

## 2017-03-23 PROCEDURE — 96376 TX/PRO/DX INJ SAME DRUG ADON: CPT

## 2017-03-23 PROCEDURE — G0378 HOSPITAL OBSERVATION PER HR: HCPCS

## 2017-03-23 PROCEDURE — 83735 ASSAY OF MAGNESIUM: CPT | Performed by: INTERNAL MEDICINE

## 2017-03-23 PROCEDURE — 25010000002 FUROSEMIDE PER 20 MG: Performed by: INTERNAL MEDICINE

## 2017-03-23 PROCEDURE — 96365 THER/PROPH/DIAG IV INF INIT: CPT

## 2017-03-23 PROCEDURE — 96375 TX/PRO/DX INJ NEW DRUG ADDON: CPT

## 2017-03-23 PROCEDURE — 99219 PR INITIAL OBSERVATION CARE/DAY 50 MINUTES: CPT | Performed by: INTERNAL MEDICINE

## 2017-03-23 PROCEDURE — 25010000002 MAGNESIUM SULFATE IN D5W 1G/100ML (PREMIX) 10-5 MG/ML-% SOLUTION: Performed by: INTERNAL MEDICINE

## 2017-03-23 RX ORDER — DILTIAZEM HYDROCHLORIDE 180 MG/1
360 CAPSULE, COATED, EXTENDED RELEASE ORAL DAILY
Status: DISCONTINUED | OUTPATIENT
Start: 2017-03-24 | End: 2017-03-24 | Stop reason: HOSPADM

## 2017-03-23 RX ORDER — DILTIAZEM HYDROCHLORIDE 120 MG/1
120 CAPSULE, COATED, EXTENDED RELEASE ORAL ONCE
Status: COMPLETED | OUTPATIENT
Start: 2017-03-23 | End: 2017-03-23

## 2017-03-23 RX ORDER — BUMETANIDE 0.25 MG/ML
2 INJECTION INTRAMUSCULAR; INTRAVENOUS 2 TIMES DAILY
Status: DISCONTINUED | OUTPATIENT
Start: 2017-03-23 | End: 2017-03-24 | Stop reason: HOSPADM

## 2017-03-23 RX ADMIN — ATORVASTATIN CALCIUM 40 MG: 80 TABLET, FILM COATED ORAL at 21:13

## 2017-03-23 RX ADMIN — FUROSEMIDE 40 MG: 10 INJECTION, SOLUTION INTRAMUSCULAR; INTRAVENOUS at 06:38

## 2017-03-23 RX ADMIN — MAGNESIUM SULFATE HEPTAHYDRATE 1 G: 1 INJECTION, SOLUTION INTRAVENOUS at 12:16

## 2017-03-23 RX ADMIN — PANTOPRAZOLE SODIUM 40 MG: 40 TABLET, DELAYED RELEASE ORAL at 06:38

## 2017-03-23 RX ADMIN — BUMETANIDE 2 MG: 0.25 INJECTION, SOLUTION INTRAMUSCULAR; INTRAVENOUS at 12:16

## 2017-03-23 RX ADMIN — BUMETANIDE 2 MG: 0.25 INJECTION, SOLUTION INTRAMUSCULAR; INTRAVENOUS at 20:09

## 2017-03-23 RX ADMIN — DABIGATRAN ETEXILATE MESYLATE 150 MG: 150 CAPSULE ORAL at 08:48

## 2017-03-23 RX ADMIN — VANCOMYCIN 500 MG: KIT at 06:38

## 2017-03-23 RX ADMIN — BUDESONIDE AND FORMOTEROL FUMARATE DIHYDRATE 2 PUFF: 80; 4.5 AEROSOL RESPIRATORY (INHALATION) at 19:46

## 2017-03-23 RX ADMIN — DILTIAZEM HYDROCHLORIDE 120 MG: 120 CAPSULE, COATED, EXTENDED RELEASE ORAL at 13:21

## 2017-03-23 RX ADMIN — DILTIAZEM HYDROCHLORIDE 240 MG: 240 CAPSULE, COATED, EXTENDED RELEASE ORAL at 08:48

## 2017-03-23 RX ADMIN — DABIGATRAN ETEXILATE MESYLATE 150 MG: 150 CAPSULE ORAL at 20:09

## 2017-03-23 RX ADMIN — BUDESONIDE AND FORMOTEROL FUMARATE DIHYDRATE 2 PUFF: 80; 4.5 AEROSOL RESPIRATORY (INHALATION) at 07:14

## 2017-03-23 RX ADMIN — VANCOMYCIN 500 MG: KIT at 23:06

## 2017-03-23 RX ADMIN — POTASSIUM CHLORIDE 40 MEQ: 750 CAPSULE, EXTENDED RELEASE ORAL at 20:09

## 2017-03-23 RX ADMIN — VANCOMYCIN 500 MG: KIT at 18:05

## 2017-03-23 RX ADMIN — VANCOMYCIN 500 MG: KIT at 12:16

## 2017-03-23 RX ADMIN — LOSARTAN POTASSIUM 50 MG: 50 TABLET, FILM COATED ORAL at 08:48

## 2017-03-23 RX ADMIN — POTASSIUM CHLORIDE 40 MEQ: 750 CAPSULE, EXTENDED RELEASE ORAL at 08:48

## 2017-03-23 NOTE — PLAN OF CARE
Problem: Patient Care Overview (Adult)  Goal: Plan of Care Review  Outcome: Ongoing (interventions implemented as appropriate)    03/23/17 7973   Coping/Psychosocial Response Interventions   Plan Of Care Reviewed With patient   Patient Care Overview   Progress progress towards functional goals is fair   Outcome Evaluation   Outcome Summary/Follow up Plan VSS. Lasix changed to IV Bumex. Cardizem PO increased due to elevated HR this am. Remains on PO abx for c-diff. Ambulated in room well.        Goal: Adult Individualization and Mutuality  Outcome: Ongoing (interventions implemented as appropriate)  Goal: Discharge Needs Assessment  Outcome: Ongoing (interventions implemented as appropriate)    Problem: Cardiac Output, Decreased (Adult)  Goal: Identify Related Risk Factors and Signs and Symptoms  Outcome: Ongoing (interventions implemented as appropriate)  Goal: Adequate Cardiac Output/Effective Tissue Perfusion  Outcome: Ongoing (interventions implemented as appropriate)

## 2017-03-23 NOTE — H&P
Patient Name: Agnieszka Rizzo  :1930  86 y.o.    Date of Admission: 3/22/2017  Encounter Provider: Marcie Robbins MD  Date of Encounter Visit: 17  Place of Service: [facility] CARDIOLOGY  Referring Provider: Marcie Robbins MD  Patient Care Team:  Gabe Horne MD as PCP - General (Internal Medicine)  Gabe Horne MD as PCP - Family Medicine  Marcie Robbins MD as Consulting Physician (Cardiology)  Nilesh Danielle MD as Consulting Physician (Urology)      Chief complaint:  CHF    History of Present Illness:  Patient is an 86 year old female with significant pulmonary history and cryoglobulinemia who was last seen in the office on 17 for follow-up regarding history of atrial fibrillation and diastolic heart failure.  She has been seen by Dr. Ana Goodrich with [facility] in the past for history of hypertension, hyperlipidemia, mild mitral valve prolapse, and nonobstructive coronary artery disease that was diagnosed by a cardiac catheterization in .  She was seen at [facility] in 2016 while being hospitalized after developing a pneumothorax s/p bronchoscopy- she was found to be in rate controlled atrial fibrillation at that time.  She was started on Pradaxa at that time.  She had an outpatient stress test in 2016- no evidence of ischemia.  A cardioversion was attempted in 2017, but was unsuccessful- rate control and anticoagulation was recommended.  She has had multiple hospitalization over the past few months for respiratory issues.  Patient was hospitalized from 3/11/17- 3/20/17 with C.Diff.  She was in atrial fibrillation with rapid ventricular response at that time- seen by Cardiology.  Medications adjusted and rate improved.  Patient was discharged home on 3/20/17.    Patient called our office yesterday complaining of bilateral leg swelling, abdominal swelling, and weight gain.  She  "was directly admitted from home yesterday for CHF exacerbation- IV Lasix initiated.  She is in atrial fibrillation with heart rate ranging from 80-100s.    Bilateral lower extremity dopplers- 3/17/17  · There was superficial venous valvular incompetence noted in the right saphenofemoral junction.  · Sub-acute right lower extremity deep vein thrombosis noted in the gastrocnemius/soleal.  · Normal left lower extremity venous duplex scan.    Cardioversion- 1/10/17  One shock of 120 J sync to the R-wave was delivered. She converted to sinus rhythm with frequent premature atrial contractions and then to normal sinus rhythm.   A few minutes later she returned to atrial fibrillation. I shocked her again with 120J sync to the R-wave. She continued in atrial fibrillation. I shocked her a third time at 150 J sync to the R-wave. She returned to normal sinus rhythm. However, she quickly converted back to atrial fibrillation.  Conclusions:  Unsuccessful cardioversion to normal sinus rhythm.\"     Stress Test- 12/19/16  · Myocardial perfusion imaging indicates a normal myocardial perfusion study with no evidence of ischemia.  · Left ventricular ejection fraction is hyperdynamic (Calculated EF > 70%).  · Impressions are consistent with a low risk study.  · There is no prior study available for comparison.     Echo- 11/15/16  · All left ventricular wall segments contract normally.  · Left ventricular function is normal. Estimated EF = 58%.  · Left atrial cavity size is moderately dilated.  · Mild tricuspid valve regurgitation is present.  · RVSP(TR) 32.4 mmHg  · Mild mitral valve regurgitation is present      Past Medical History:   Diagnosis Date   • Asthma    • Atrial fibrillation    • Atrial flutter    • Bronchiectasis    • C. difficile diarrhea 3/11/2017   • CAD (coronary artery disease)     nonobstructive   • Colitis    • Cough    • Cryoglobulinemia    • Diastolic heart failure    • Fall    • Hyperlipidemia    • Hypertension  "   • Hyponatremia    • Hypoxia    • Infectious viral hepatitis     AGE 13   • Leg swelling    • Lesion of lung    • MR (mitral regurgitation)     mild   • MVP (mitral valve prolapse)    • Permanent atrial fibrillation    • Pneumonia with the fungal infection aspergillosis 1/6/2017   • SOB (shortness of breath)    • UTI (urinary tract infection)    • Wheeze     mild       Past Surgical History:   Procedure Laterality Date   • BRONCHOSCOPY N/A 11/12/2016    Procedure: BRONCHOSCOPY WITH FLUORO, BRUSHINGS, BAL, AND BIOPSIES;  Surgeon: Rogelio Tucker MD;  Location: [facility] ENDOSCOPY;  Service:    • CATARACT EXTRACTION EXTRACAPSULAR W/ INTRAOCULAR LENS IMPLANTATION     • COLONOSCOPY      2013   • D&C WITH SUCTION     • HYSTERECTOMY     • KNEE ARTHROSCOPY Left          Prior to Admission medications    Medication Sig Start Date End Date Taking? Authorizing Provider   acetylcysteine (MUCOMYST) 20 % nebulizer solution Take 4 mL by nebulization 4 (Four) Times a Day. 1/9/17   Rogelio Tucker MD   ADVAIR -21 MCG/ACT inhaler Inhale 2 puffs 2 (Two) Times a Day. 1/3/17   Gerhard Camilo MD   atorvastatin (LIPITOR) 40 MG tablet Take 1 tablet by mouth Daily. 1/19/17   Marcie Robbins MD   B Complex-C (SUPER B COMPLEX PO) Take  by mouth.    Nurse [employer] [facility], RN   cholecalciferol (VITAMIN D3) 1000 UNITS tablet Take 1,000 Units by mouth Daily.    Nurse Epic Emergency, RN   dabigatran etexilate (PRADAXA) 150 MG capsu Take 1 capsule by mouth Every 12 (Twelve) Hours. 1/5/17   Marcie Robbins MD   diltiaZEM CD (CARDIZEM CD) 240 MG 24 hr capsule Take 1 capsule by mouth Daily. 2/28/17   Marcie Robbins MD   diltiaZEM CD (CARDIZEM CD) 240 MG 24 hr capsule Take 1 capsule by mouth Daily. 2/28/17   Marcie Robbins MD   ezetimibe (ZETIA) 10 MG tablet Take 1 tablet by mouth Daily. 1/19/17   Marcie Robbins MD   furosemide (LASIX) 20 MG tablet Take 20 mg by mouth 2 (Two) Times a Day.    Historical Provider, MD   losartan (COZAAR) 50 MG  tablet 50 mg 2 (Two) Times a Day. 6/3/16   Historical Provider, MD   Multiple Vitamin (MULTIVITAMIN) tablet Take 1 tablet by mouth Daily.    Historical Provider, MD   pantoprazole (PROTONIX) 40 MG EC tablet Take 1 tablet by mouth Daily. 1/9/17   Rogelio Tucker MD   vancomycin 50 MG/ML solution oral solution Take 10 mL by mouth Every 6 (Six) Hours for 22 doses. Indications: Clostridium Difficile Infection 3/20/17 3/26/17  Bronson Lopez MD   VERAMYST 27.5 MCG/SPRAY nasal spray 1 spray into each nostril Daily. 7/25/16   Historical Provider, MD       Allergies   Allergen Reactions   • Amlodipine Besylate Swelling   • Aspirin      Caused bleeding ulcers   • Bactrim [Sulfamethoxazole-Trimethoprim] Nausea And Vomiting   • Erythromycin    • Levaquin [Levofloxacin]    • Macrobid [Nitrofurantoin] Nausea Only   • Penicillins      Has tolerated ceftriaxone in the past   • Ramipril Other (See Comments)     Cough       Social History     Social History   • Marital status:      Spouse name: N/A   • Number of children: N/A   • Years of education: N/A     Social History Main Topics   • Smoking status: Never Smoker   • Smokeless tobacco: Never Used   • Alcohol use No   • Drug use: No   • Sexual activity: Yes     Partners: Male     Other Topics Concern   • Not on file     Social History Narrative       Family History   Problem Relation Age of Onset   • Hypertension Mother    • Hypertension Father    • Stroke Father    • Cancer Son        REVIEW OF SYSTEMS:   All other systems reviewed and negative.       Objective:     Vitals:    03/22/17 2308 03/23/17 0500 03/23/17 0714 03/23/17 0757   BP: 111/78   130/83   BP Location: Right arm   Right arm   Patient Position: Lying   Sitting   Pulse: 90  85 98   Resp: 14  16 25   Temp: 97.8 °F (36.6 °C)   97.4 °F (36.3 °C)   TempSrc: Oral   Oral   SpO2: 99%  98% 97%   Weight:  124 lb 8 oz (56.5 kg)     Height:         Body mass index is 22.05 kg/(m^2).    Constitutional: She is oriented to  person, place, and time. She appears well-developed. She does not appear ill.   HENT:   Head: Normocephalic and atraumatic. Head is without contusion.   Right Ear: Hearing normal. No drainage.   Left Ear: Hearing normal. No drainage.   Nose: No nasal deformity. No epistaxis.   Eyes: Lids are normal. Right eye exhibits no exudate. Left eye exhibits no exudate.  Neck: No JVD present. Carotid bruit is not present. No tracheal deviation present. No thyroid mass and no thyromegaly present.   Cardiovascular: Normal rate, regular rhythm and normal heart sounds.    Pulses:       Posterior tibial pulses are 2+ on the right side, and 2+ on the left side.   Pulmonary/Chest: Effort normal and breath sounds normal.   Abdominal: Soft. Normal appearance and bowel sounds are normal. There is no tenderness.   Musculoskeletal: Normal range of motion.        Right shoulder: She exhibits no deformity.        Left shoulder: She exhibits no deformity.   Neurological: She is alert and oriented to person, place, and time. She has normal strength.   Skin: Skin is warm, dry and intact. No rash noted.   Psychiatric: She has a normal mood and affect. Her behavior is normal. Thought content normal.   Vitals reviewed    Lab Review:       Results from last 7 days  Lab Units 03/23/17  0418 03/22/17  1703   SODIUM mmol/L 137 134*   POTASSIUM mmol/L 4.5 5.5*   CHLORIDE mmol/L 98 97*   TOTAL CO2 mmol/L 29.9* 28.5   BUN mg/dL 3* 5*   CREATININE mg/dL 0.41* 0.44*   CALCIUM mg/dL 8.9 8.8   BILIRUBIN mg/dL  --  0.4   ALK PHOS U/L  --  60   ALT (SGPT) U/L  --  21   AST (SGOT) U/L  --  25   GLUCOSE mg/dL 87 94           Results from last 7 days  Lab Units 03/22/17  1703   WBC 10*3/mm3 7.73   HEMOGLOBIN g/dL 9.2*   HEMATOCRIT % 29.3*   PLATELETS 10*3/mm3 374           Results from last 7 days  Lab Units 03/23/17  0418   MAGNESIUM mg/dL 1.7         I personally viewed and interpreted the patient's EKG/Telemetry data.    Assessment and Plan:       1. Atrial  fibrillation. She failed cardioversion in January 2017. She has a CHADS2-Vasc score of 5. She is anticoagulated with Pradaxa. She is generally rate controlled. She is not a candidate for beta blockers because of lung disease.  2. Acute diastolic heart failure.   3. Dyspnea on exertion. Multifactorial  4. Mitral valve prolapse with very mild mitral regurgitation.  5. Mild tricuspid regurgitation without pulmonary hypertension.  6. Nonobstructive coronary artery disease diagnosed by catheter in 2007. She has noted coronary artery calcification on CT scans. Nuclear stress 12/16 was normal.   7. Hypertension. Her blood pressure is controlled.  8. Hyperlipidemia. Zetia and atorvastatin  9. Hyponatremia. Stable.  10. C. difficile diarrhea.      She put out good fluid yesterday but has not responded much to this morning dose of Lasix.  I am going to switch her to Bumex.    Marcie Robbins MD  03/23/17  10:05 AM

## 2017-03-23 NOTE — PLAN OF CARE
Problem: Patient Care Overview (Adult)  Goal: Plan of Care Review  Outcome: Ongoing (interventions implemented as appropriate)    03/23/17 0586   Coping/Psychosocial Response Interventions   Plan Of Care Reviewed With patient   Patient Care Overview   Progress improving   Outcome Evaluation   Outcome Summary/Follow up Plan VSS. Pt continues to diurese very well on IV Lasix. K+ improved this morning; will continue to monitor.

## 2017-03-24 ENCOUNTER — RESULTS ENCOUNTER (OUTPATIENT)
Dept: CARDIOLOGY | Facility: CLINIC | Age: 82
End: 2017-03-24

## 2017-03-24 VITALS
TEMPERATURE: 98.2 F | HEART RATE: 97 BPM | BODY MASS INDEX: 21.02 KG/M2 | WEIGHT: 118.6 LBS | RESPIRATION RATE: 17 BRPM | HEIGHT: 63 IN | SYSTOLIC BLOOD PRESSURE: 118 MMHG | DIASTOLIC BLOOD PRESSURE: 72 MMHG | OXYGEN SATURATION: 96 %

## 2017-03-24 DIAGNOSIS — E78.2 MIXED HYPERLIPIDEMIA: ICD-10-CM

## 2017-03-24 DIAGNOSIS — I25.10 CHRONIC CORONARY ARTERY DISEASE: ICD-10-CM

## 2017-03-24 LAB
ANION GAP SERPL CALCULATED.3IONS-SCNC: 11 MMOL/L
BUN BLD-MCNC: 5 MG/DL (ref 8–23)
BUN/CREAT SERPL: 10.9 (ref 7–25)
CALCIUM SPEC-SCNC: 8.6 MG/DL (ref 8.6–10.5)
CHLORIDE SERPL-SCNC: 94 MMOL/L (ref 98–107)
CO2 SERPL-SCNC: 30 MMOL/L (ref 22–29)
CREAT BLD-MCNC: 0.46 MG/DL (ref 0.57–1)
GFR SERPL CREATININE-BSD FRML MDRD: 129 ML/MIN/1.73
GLUCOSE BLD-MCNC: 101 MG/DL (ref 65–99)
MAGNESIUM SERPL-MCNC: 1.9 MG/DL (ref 1.6–2.4)
POTASSIUM BLD-SCNC: 4.4 MMOL/L (ref 3.5–5.2)
SODIUM BLD-SCNC: 135 MMOL/L (ref 136–145)

## 2017-03-24 PROCEDURE — G0378 HOSPITAL OBSERVATION PER HR: HCPCS

## 2017-03-24 PROCEDURE — 99217 PR OBSERVATION CARE DISCHARGE MANAGEMENT: CPT | Performed by: INTERNAL MEDICINE

## 2017-03-24 PROCEDURE — 83735 ASSAY OF MAGNESIUM: CPT | Performed by: INTERNAL MEDICINE

## 2017-03-24 PROCEDURE — 94799 UNLISTED PULMONARY SVC/PX: CPT

## 2017-03-24 PROCEDURE — 80048 BASIC METABOLIC PNL TOTAL CA: CPT | Performed by: INTERNAL MEDICINE

## 2017-03-24 RX ORDER — DILTIAZEM HYDROCHLORIDE 360 MG/1
360 CAPSULE, EXTENDED RELEASE ORAL DAILY
Qty: 30 CAPSULE | Refills: 11 | Status: SHIPPED | OUTPATIENT
Start: 2017-03-24 | End: 2017-03-24 | Stop reason: SDUPTHER

## 2017-03-24 RX ORDER — DILTIAZEM HYDROCHLORIDE 360 MG/1
CAPSULE, EXTENDED RELEASE ORAL
Qty: 90 CAPSULE | Refills: 3 | Status: SHIPPED | OUTPATIENT
Start: 2017-03-24 | End: 2017-05-02

## 2017-03-24 RX ADMIN — POTASSIUM CHLORIDE 40 MEQ: 750 CAPSULE, EXTENDED RELEASE ORAL at 08:52

## 2017-03-24 RX ADMIN — BUDESONIDE AND FORMOTEROL FUMARATE DIHYDRATE 2 PUFF: 80; 4.5 AEROSOL RESPIRATORY (INHALATION) at 07:48

## 2017-03-24 RX ADMIN — VANCOMYCIN 500 MG: KIT at 05:52

## 2017-03-24 RX ADMIN — LOSARTAN POTASSIUM 50 MG: 50 TABLET, FILM COATED ORAL at 08:53

## 2017-03-24 RX ADMIN — DABIGATRAN ETEXILATE MESYLATE 150 MG: 150 CAPSULE ORAL at 08:52

## 2017-03-24 RX ADMIN — PANTOPRAZOLE SODIUM 40 MG: 40 TABLET, DELAYED RELEASE ORAL at 05:52

## 2017-03-24 RX ADMIN — DILTIAZEM HYDROCHLORIDE 360 MG: 180 CAPSULE, COATED, EXTENDED RELEASE ORAL at 08:53

## 2017-03-24 NOTE — DISCHARGE SUMMARY
Patient Name: Agnieszka Rizzo  :1930  86 y.o.    Date of Admit: 3/22/2017  Date of Discharge:  3/24/2017      Hospital Course:     She had been hospitalized with a C. difficile infection.  She had lower extremity edema and abdominal distention during that hospitalization.  She was discharged home in swelling and distention became worse.  I admitted her for acute diastolic heart failure.  She diuresed well.  Her labs have remained stable.  I am going to discharge her on her usual dose of Lasix.  I've asked her to weigh daily and call me if her weight increases more than 3 pounds in 3 days or 5 pounds in 5 days.  I increased her diltiazem for better heart rate control.      Pertinent Test Results:     Results from last 7 days  Lab Units 17  0432  17  1703   SODIUM mmol/L 135*  < > 134*   POTASSIUM mmol/L 4.4  < > 5.5*   CHLORIDE mmol/L 94*  < > 97*   TOTAL CO2 mmol/L 30.0*  < > 28.5   BUN mg/dL 5*  < > 5*   CREATININE mg/dL 0.46*  < > 0.44*   CALCIUM mg/dL 8.6  < > 8.8   BILIRUBIN mg/dL  --   --  0.4   ALK PHOS U/L  --   --  60   ALT (SGPT) U/L  --   --  21   AST (SGOT) U/L  --   --  25   GLUCOSE mg/dL 101*  < > 94   < > = values in this interval not displayed.        Results from last 7 days  Lab Units 17  1703   WBC 10*3/mm3 7.73   HEMOGLOBIN g/dL 9.2*   HEMATOCRIT % 29.3*   PLATELETS 10*3/mm3 374           Results from last 7 days  Lab Units 17  0432   MAGNESIUM mg/dL 1.9           Condition on Discharge: stable    Discharge Medications   Agnieszka Rizzo   Home Medication Instructions SILVIA:[id number]    Printed on:17 0811   Medication Information                      acetylcysteine (MUCOMYST) 20 % nebulizer solution  Take 4 mL by nebulization 4 (Four) Times a Day.             ADVAIR -21 MCG/ACT inhaler  Inhale 2 puffs 2 (Two) Times a Day.             atorvastatin (LIPITOR) 40 MG tablet  Take 1 tablet by mouth Daily.             B Complex-C (SUPER B COMPLEX PO)  Take  by  mouth.             cholecalciferol (VITAMIN D3) 1000 UNITS tablet  Take 1,000 Units by mouth Daily.             dabigatran etexilate (PRADAXA) 150 MG capsu  Take 1 capsule by mouth Every 12 (Twelve) Hours.             diltiaZEM CD (CARDIZEM CD) 360 MG 24 hr capsule  Take 1 capsule by mouth Daily.             ezetimibe (ZETIA) 10 MG tablet  Take 1 tablet by mouth Daily.             furosemide (LASIX) 20 MG tablet  Take 20 mg by mouth 2 (Two) Times a Day.             losartan (COZAAR) 50 MG tablet  50 mg 2 (Two) Times a Day.             Multiple Vitamin (MULTIVITAMIN) tablet  Take 1 tablet by mouth Daily.             pantoprazole (PROTONIX) 40 MG EC tablet  Take 1 tablet by mouth Daily.             vancomycin 50 MG/ML solution oral solution  Take 10 mL by mouth Every 6 (Six) Hours for 22 doses. Indications: Clostridium Difficile Infection             VERAMYST 27.5 MCG/SPRAY nasal spray  1 spray into each nostril Daily.                 Discharge Diet:   Diet Instructions     Diet: Cardiac; Thin Liquids, No Restrictions       Discharge Diet:  Cardiac   Fluid Consistency:  Thin Liquids, No Restrictions                 Activity at Discharge:   Activity Instructions     Activity as Tolerated                     Discharge disposition: home    Follow-up Appointments  Future Appointments  Date Time Provider Department Center   4/10/2017 1:00 PM Matt Rose MD [facility] None   4/10/2017 1:30 PM Aubrey Steward MD [facility] None   4/13/2017 10:20 AM MD [facility] [place] [facility] None   4/13/2017 11:40 AM MD [facility] [place] [facility] None     Additional Instructions for the Follow-ups that You Need to Schedule     Discharge Follow-Up With Specified Provider    As directed    To:  keep follow up appt with me                  Marcie Robbins MD, [facility]  03/24/17  8:59 AM    Time: Discharge 40 min

## 2017-03-24 NOTE — PLAN OF CARE
Problem: Cardiac: Heart Failure (Adult)  Goal: Signs and Symptoms of Listed Potential Problems Will be Absent or Manageable (Cardiac: Heart Failure)  Outcome: Ongoing (interventions implemented as appropriate)

## 2017-03-24 NOTE — DISCHARGE INSTRUCTIONS
Weigh yourself daily at the same time and in same type clothing.  Keep a record (see attached).  Call Dr Robbins immediately if your weight increases more than 3 pounds in 3 days or 5 pounds in 5 days.

## 2017-03-24 NOTE — PLAN OF CARE
Problem: Patient Care Overview (Adult)  Goal: Plan of Care Review  Outcome: Outcome(s) achieved Date Met:  03/24/17  Goal: Adult Individualization and Mutuality  Outcome: Outcome(s) achieved Date Met:  03/24/17  Goal: Discharge Needs Assessment  Outcome: Outcome(s) achieved Date Met:  03/24/17    Problem: Cardiac Output, Decreased (Adult)  Goal: Identify Related Risk Factors and Signs and Symptoms  Outcome: Outcome(s) achieved Date Met:  03/24/17  Goal: Adequate Cardiac Output/Effective Tissue Perfusion  Outcome: Outcome(s) achieved Date Met:  03/24/17    Problem: Cardiac: Heart Failure (Adult)  Goal: Signs and Symptoms of Listed Potential Problems Will be Absent or Manageable (Cardiac: Heart Failure)  Outcome: Outcome(s) achieved Date Met:  03/24/17

## 2017-03-27 ENCOUNTER — TELEPHONE (OUTPATIENT)
Dept: INFECTIOUS DISEASES | Facility: CLINIC | Age: 82
End: 2017-03-27

## 2017-03-27 NOTE — TELEPHONE ENCOUNTER
Patient states that Dr. Landry had requested that she begin probiotic, Kefir after her completion of vanco.  Patient states that the serving size is 1 cup and that she cannot tolerate that much. Patient is wondering if she can take a probiotic capsule and if so, can she buy OTC or does she need a script?  Patient states she is feeling well.

## 2017-04-02 ENCOUNTER — HOSPITAL ENCOUNTER (INPATIENT)
Facility: HOSPITAL | Age: 82
LOS: 9 days | Discharge: HOME | End: 2017-04-11
Attending: EMERGENCY MEDICINE | Admitting: INTERNAL MEDICINE

## 2017-04-02 ENCOUNTER — APPOINTMENT (OUTPATIENT)
Dept: CT IMAGING | Facility: HOSPITAL | Age: 82
End: 2017-04-02

## 2017-04-02 DIAGNOSIS — R53.1 GENERALIZED WEAKNESS: ICD-10-CM

## 2017-04-02 DIAGNOSIS — K51.00 PANCOLITIS (HCC): Primary | ICD-10-CM

## 2017-04-02 DIAGNOSIS — D72.829 LEUKOCYTOSIS, UNSPECIFIED TYPE: ICD-10-CM

## 2017-04-02 LAB
ALBUMIN SERPL-MCNC: 3.1 G/DL (ref 3.5–5.2)
ALBUMIN/GLOB SERPL: 1.1 G/DL
ALP SERPL-CCNC: 66 U/L (ref 39–117)
ALT SERPL W P-5'-P-CCNC: 15 U/L (ref 1–33)
ANION GAP SERPL CALCULATED.3IONS-SCNC: 13.7 MMOL/L
APTT PPP: 40.6 SECONDS (ref 22.7–35.4)
AST SERPL-CCNC: 18 U/L (ref 1–32)
BACTERIA UR QL AUTO: ABNORMAL /HPF
BASOPHILS # BLD AUTO: 0.03 10*3/MM3 (ref 0–0.2)
BASOPHILS NFR BLD AUTO: 0.2 % (ref 0–1.5)
BILIRUB SERPL-MCNC: 1.3 MG/DL (ref 0.1–1.2)
BILIRUB UR QL STRIP: NEGATIVE
BUN BLD-MCNC: 13 MG/DL (ref 8–23)
BUN/CREAT SERPL: 15.5 (ref 7–25)
BURR CELLS BLD QL SMEAR: NORMAL
CALCIUM SPEC-SCNC: 8.9 MG/DL (ref 8.6–10.5)
CHLORIDE SERPL-SCNC: 91 MMOL/L (ref 98–107)
CLARITY UR: CLEAR
CO2 SERPL-SCNC: 24.3 MMOL/L (ref 22–29)
COLOR UR: YELLOW
CREAT BLD-MCNC: 0.84 MG/DL (ref 0.57–1)
D-LACTATE SERPL-SCNC: 1.3 MMOL/L (ref 0.5–2)
DEPRECATED RDW RBC AUTO: 60.6 FL (ref 37–54)
EOSINOPHIL # BLD AUTO: 0.02 10*3/MM3 (ref 0–0.7)
EOSINOPHIL NFR BLD AUTO: 0.1 % (ref 0.3–6.2)
ERYTHROCYTE [DISTWIDTH] IN BLOOD BY AUTOMATED COUNT: 17.4 % (ref 11.7–13)
GFR SERPL CREATININE-BSD FRML MDRD: 64 ML/MIN/1.73
GLOBULIN UR ELPH-MCNC: 2.8 GM/DL
GLUCOSE BLD-MCNC: 121 MG/DL (ref 65–99)
GLUCOSE UR STRIP-MCNC: NEGATIVE MG/DL
HCT VFR BLD AUTO: 29.6 % (ref 35.6–45.5)
HGB BLD-MCNC: 9.5 G/DL (ref 11.9–15.5)
HGB UR QL STRIP.AUTO: NEGATIVE
HYALINE CASTS UR QL AUTO: ABNORMAL /LPF
IMM GRANULOCYTES # BLD: 0.05 10*3/MM3 (ref 0–0.03)
IMM GRANULOCYTES NFR BLD: 0.3 % (ref 0–0.5)
INR PPP: 1.97 (ref 0.9–1.1)
KETONES UR QL STRIP: NEGATIVE
LEUKOCYTE ESTERASE UR QL STRIP.AUTO: ABNORMAL
LYMPHOCYTES # BLD AUTO: 0.68 10*3/MM3 (ref 0.9–4.8)
LYMPHOCYTES NFR BLD AUTO: 4.1 % (ref 19.6–45.3)
MCH RBC QN AUTO: 30.7 PG (ref 26.9–32)
MCHC RBC AUTO-ENTMCNC: 32.1 G/DL (ref 32.4–36.3)
MCV RBC AUTO: 95.8 FL (ref 80.5–98.2)
MONOCYTES # BLD AUTO: 0.88 10*3/MM3 (ref 0.2–1.2)
MONOCYTES NFR BLD AUTO: 5.3 % (ref 5–12)
NEUTROPHILS # BLD AUTO: 15.06 10*3/MM3 (ref 1.9–8.1)
NEUTROPHILS NFR BLD AUTO: 90 % (ref 42.7–76)
NITRITE UR QL STRIP: NEGATIVE
PH UR STRIP.AUTO: 6.5 [PH] (ref 5–8)
PLAT MORPH BLD: NORMAL
PLATELET # BLD AUTO: 263 10*3/MM3 (ref 140–500)
PMV BLD AUTO: 10.7 FL (ref 6–12)
POTASSIUM BLD-SCNC: 3.8 MMOL/L (ref 3.5–5.2)
PROT SERPL-MCNC: 5.9 G/DL (ref 6–8.5)
PROT UR QL STRIP: NEGATIVE
PROTHROMBIN TIME: 21.7 SECONDS (ref 11.7–14.2)
RBC # BLD AUTO: 3.09 10*6/MM3 (ref 3.9–5.2)
RBC # UR: ABNORMAL /HPF
REF LAB TEST METHOD: ABNORMAL
ROULEAUX BLD QL SMEAR: NORMAL
SODIUM BLD-SCNC: 129 MMOL/L (ref 136–145)
SP GR UR STRIP: 1.02 (ref 1–1.03)
SQUAMOUS #/AREA URNS HPF: ABNORMAL /HPF
UROBILINOGEN UR QL STRIP: ABNORMAL
WBC MORPH BLD: NORMAL
WBC NRBC COR # BLD: 16.72 10*3/MM3 (ref 4.5–10.7)
WBC UR QL AUTO: ABNORMAL /HPF

## 2017-04-02 PROCEDURE — 93010 ELECTROCARDIOGRAM REPORT: CPT | Performed by: INTERNAL MEDICINE

## 2017-04-02 PROCEDURE — 87186 SC STD MICRODIL/AGAR DIL: CPT

## 2017-04-02 PROCEDURE — 87086 URINE CULTURE/COLONY COUNT: CPT

## 2017-04-02 PROCEDURE — 80053 COMPREHEN METABOLIC PANEL: CPT

## 2017-04-02 PROCEDURE — 85730 THROMBOPLASTIN TIME PARTIAL: CPT

## 2017-04-02 PROCEDURE — 87040 BLOOD CULTURE FOR BACTERIA: CPT | Performed by: PHYSICIAN ASSISTANT

## 2017-04-02 PROCEDURE — 99284 EMERGENCY DEPT VISIT MOD MDM: CPT

## 2017-04-02 PROCEDURE — 83605 ASSAY OF LACTIC ACID: CPT | Performed by: PHYSICIAN ASSISTANT

## 2017-04-02 PROCEDURE — 93005 ELECTROCARDIOGRAM TRACING: CPT

## 2017-04-02 PROCEDURE — 0 IOPAMIDOL 61 % SOLUTION: Performed by: EMERGENCY MEDICINE

## 2017-04-02 PROCEDURE — 85610 PROTHROMBIN TIME: CPT

## 2017-04-02 PROCEDURE — 85025 COMPLETE CBC W/AUTO DIFF WBC: CPT

## 2017-04-02 PROCEDURE — 81001 URINALYSIS AUTO W/SCOPE: CPT

## 2017-04-02 PROCEDURE — 85007 BL SMEAR W/DIFF WBC COUNT: CPT

## 2017-04-02 PROCEDURE — 74177 CT ABD & PELVIS W/CONTRAST: CPT

## 2017-04-02 RX ORDER — ACETYLCYSTEINE 200 MG/ML
4 SOLUTION ORAL; RESPIRATORY (INHALATION)
Status: DISCONTINUED | OUTPATIENT
Start: 2017-04-02 | End: 2017-04-11 | Stop reason: HOSPADM

## 2017-04-02 RX ORDER — DABIGATRAN ETEXILATE 150 MG/1
150 CAPSULE ORAL EVERY 12 HOURS SCHEDULED
Status: DISCONTINUED | OUTPATIENT
Start: 2017-04-02 | End: 2017-04-11 | Stop reason: HOSPADM

## 2017-04-02 RX ORDER — ONDANSETRON 4 MG/1
4 TABLET, ORALLY DISINTEGRATING ORAL EVERY 6 HOURS PRN
Status: DISCONTINUED | OUTPATIENT
Start: 2017-04-02 | End: 2017-04-11 | Stop reason: HOSPADM

## 2017-04-02 RX ORDER — ATORVASTATIN CALCIUM 40 MG/1
40 TABLET, FILM COATED ORAL DAILY
Status: DISCONTINUED | OUTPATIENT
Start: 2017-04-03 | End: 2017-04-11 | Stop reason: HOSPADM

## 2017-04-02 RX ORDER — SODIUM CHLORIDE 9 MG/ML
75 INJECTION, SOLUTION INTRAVENOUS CONTINUOUS
Status: DISCONTINUED | OUTPATIENT
Start: 2017-04-02 | End: 2017-04-02

## 2017-04-02 RX ORDER — SODIUM CHLORIDE 0.9 % (FLUSH) 0.9 %
10 SYRINGE (ML) INJECTION AS NEEDED
Status: DISCONTINUED | OUTPATIENT
Start: 2017-04-02 | End: 2017-04-11 | Stop reason: HOSPADM

## 2017-04-02 RX ORDER — LOSARTAN POTASSIUM 25 MG/1
25 TABLET ORAL
Status: DISCONTINUED | OUTPATIENT
Start: 2017-04-03 | End: 2017-04-03

## 2017-04-02 RX ORDER — SODIUM CHLORIDE 9 MG/ML
75 INJECTION, SOLUTION INTRAVENOUS CONTINUOUS
Status: DISCONTINUED | OUTPATIENT
Start: 2017-04-02 | End: 2017-04-05

## 2017-04-02 RX ORDER — ONDANSETRON 4 MG/1
4 TABLET, FILM COATED ORAL EVERY 6 HOURS PRN
Status: DISCONTINUED | OUTPATIENT
Start: 2017-04-02 | End: 2017-04-11 | Stop reason: HOSPADM

## 2017-04-02 RX ORDER — FAMOTIDINE 20 MG/1
20 TABLET, FILM COATED ORAL DAILY
Status: DISCONTINUED | OUTPATIENT
Start: 2017-04-03 | End: 2017-04-11 | Stop reason: HOSPADM

## 2017-04-02 RX ORDER — DILTIAZEM HYDROCHLORIDE 180 MG/1
360 CAPSULE, COATED, EXTENDED RELEASE ORAL
Status: DISCONTINUED | OUTPATIENT
Start: 2017-04-03 | End: 2017-04-11 | Stop reason: HOSPADM

## 2017-04-02 RX ORDER — MELATONIN
1000 DAILY
Status: DISCONTINUED | OUTPATIENT
Start: 2017-04-03 | End: 2017-04-11 | Stop reason: HOSPADM

## 2017-04-02 RX ORDER — SODIUM CHLORIDE 0.9 % (FLUSH) 0.9 %
1-10 SYRINGE (ML) INJECTION AS NEEDED
Status: DISCONTINUED | OUTPATIENT
Start: 2017-04-02 | End: 2017-04-11 | Stop reason: HOSPADM

## 2017-04-02 RX ORDER — ONDANSETRON 2 MG/ML
4 INJECTION INTRAMUSCULAR; INTRAVENOUS EVERY 6 HOURS PRN
Status: DISCONTINUED | OUTPATIENT
Start: 2017-04-02 | End: 2017-04-11 | Stop reason: HOSPADM

## 2017-04-02 RX ORDER — ACETAMINOPHEN 325 MG/1
650 TABLET ORAL EVERY 4 HOURS PRN
Status: DISCONTINUED | OUTPATIENT
Start: 2017-04-02 | End: 2017-04-11 | Stop reason: HOSPADM

## 2017-04-02 RX ORDER — BUDESONIDE AND FORMOTEROL FUMARATE DIHYDRATE 160; 4.5 UG/1; UG/1
2 AEROSOL RESPIRATORY (INHALATION)
Status: DISCONTINUED | OUTPATIENT
Start: 2017-04-02 | End: 2017-04-11 | Stop reason: HOSPADM

## 2017-04-02 RX ADMIN — SODIUM CHLORIDE 500 ML: 9 INJECTION, SOLUTION INTRAVENOUS at 17:46

## 2017-04-02 RX ADMIN — IOPAMIDOL 85 ML: 612 INJECTION, SOLUTION INTRAVENOUS at 18:27

## 2017-04-02 RX ADMIN — VANCOMYCIN 250 MG: KIT at 20:00

## 2017-04-02 RX ADMIN — SODIUM CHLORIDE 75 ML/HR: 9 INJECTION, SOLUTION INTRAVENOUS at 21:38

## 2017-04-02 RX ADMIN — SODIUM CHLORIDE 75 ML/HR: 9 INJECTION, SOLUTION INTRAVENOUS at 19:54

## 2017-04-02 NOTE — ED PROVIDER NOTES
EMERGENCY DEPARTMENT ENCOUNTER    CHIEF COMPLAINT  Chief Complaint: Vomiting, diarrhea  History given by: Pt  History limited by: N/A  Room Number: 04/04  PMD: Matt Rose MD      HPI:  Pt is a 86 y.o. female who presents complaining of vomiting and diarrhea over the past 3 days. She also c/o hoarse voice and mild SOA. Pt reports finishing her Vancomycin around 1 week ago and that her C-diff symptoms had resolved. Pt states her diarrhea today is different than her previous C-diff symptoms. She has been evaluated by Dr. Tucker (Pulmonology) and Dr. Robbins (Cardiology) in the past. Pt has a history of A-fib. She denies chest pain, dark/bloody stool, or other unusual bleeding.    Duration: 3 days  Onset: Gradual  Timing: Constant  Location: GI  Radiation: None  Quality: Vomiting, diarrhea  Intensity/Severity: Moderate  Progression: Worsening  Associated Symptoms: Hoarse voice, mild SOA  Aggravating Factors: Eating  Alleviating Factors: Nothing  Previous Episodes: None specified  Treatment before arrival: None specified    PAST MEDICAL HISTORY  Active Ambulatory Problems     Diagnosis Date Noted   • Pneumothorax of right lung after biopsy 11/12/2016   • Pulmonary aspergillosis 11/16/2016   • Benign essential hypertension 12/01/2016   • Chronic coronary artery disease 12/01/2016   • Hyperlipidemia 12/01/2016   • Mitral valve insufficiency 12/01/2016   • Ventricular premature beats 12/01/2016   • Ventricular tachycardia 12/01/2016   • Chronic atrial fibrillation 12/01/2016   • Pneumonia 01/01/2017   • Pneumonia with the fungal infection aspergillosis 01/06/2017   • Acute respiratory failure with hypoxia 01/09/2017   • Acid-fast bacteria present 01/09/2017   • Hyponatremia 02/08/2017   • C. difficile diarrhea 03/11/2017   • DNR (do not resuscitate) 03/12/2017   • Sepsis 03/12/2017   • Chronic diastolic CHF (congestive heart failure) 03/12/2017   • CHF (congestive heart failure) 03/22/2017     Resolved Ambulatory Problems      Diagnosis Date Noted   • Heart failure, diastolic, with acute decompensation 12/01/2016     Past Medical History:   Diagnosis Date   • Asthma    • Atrial fibrillation    • Atrial flutter    • Bronchiectasis    • C. difficile diarrhea 3/11/2017   • CAD (coronary artery disease)    • Colitis    • Cough    • Cryoglobulinemia    • Diastolic heart failure    • Fall    • Hyperlipidemia    • Hypertension    • Hyponatremia    • Hypoxia    • Infectious viral hepatitis    • Leg swelling    • Lesion of lung    • MR (mitral regurgitation)    • MVP (mitral valve prolapse)    • Permanent atrial fibrillation    • Pneumonia with the fungal infection aspergillosis 1/6/2017   • SOB (shortness of breath)    • UTI (urinary tract infection)    • Wheeze        PAST SURGICAL HISTORY  Past Surgical History:   Procedure Laterality Date   • BRONCHOSCOPY N/A 11/12/2016    Procedure: BRONCHOSCOPY WITH FLUORO, BRUSHINGS, BAL, AND BIOPSIES;  Surgeon: Rogelio Tucker MD;  Location: [facility] ENDOSCOPY;  Service:    • CATARACT EXTRACTION EXTRACAPSULAR W/ INTRAOCULAR LENS IMPLANTATION     • COLONOSCOPY      2013   • D&C WITH SUCTION     • HYSTERECTOMY     • KNEE ARTHROSCOPY Left        FAMILY HISTORY  Family History   Problem Relation Age of Onset   • Hypertension Mother    • Hypertension Father    • Stroke Father    • Cancer Son        SOCIAL HISTORY  Social History     Social History   • Marital status:      Spouse name: N/A   • Number of children: N/A   • Years of education: N/A     Occupational History   • Not on file.     Social History Main Topics   • Smoking status: Never Smoker   • Smokeless tobacco: Never Used   • Alcohol use No   • Drug use: No   • Sexual activity: Yes     Partners: Male     Other Topics Concern   • Not on file     Social History Narrative       ALLERGIES  Amlodipine besylate; Aspirin; Bactrim [sulfamethoxazole-trimethoprim]; Erythromycin; Levaquin [levofloxacin]; Macrobid [nitrofurantoin]; Penicillins; and  Ramipril    REVIEW OF SYSTEMS  Review of Systems   Constitutional: Negative for chills and fever.   HENT: Positive for voice change (hoarse). Negative for sore throat and trouble swallowing.    Eyes: Negative for visual disturbance.   Respiratory: Positive for shortness of breath (mild). Negative for cough.    Cardiovascular: Negative for chest pain, palpitations and leg swelling.   Gastrointestinal: Positive for diarrhea, nausea and vomiting. Negative for abdominal pain.   Endocrine: Negative.    Genitourinary: Negative for decreased urine volume, dysuria and frequency.   Musculoskeletal: Negative for neck pain.   Skin: Negative for rash.   Allergic/Immunologic: Negative.    Neurological: Negative for syncope, weakness, numbness and headaches.   Hematological: Negative.    Psychiatric/Behavioral: Negative.    All other systems reviewed and are negative.      PHYSICAL EXAM  ED Triage Vitals   Temp Heart Rate Resp BP SpO2   04/02/17 1650 04/02/17 1650 04/02/17 1650 04/02/17 1650 04/02/17 1650   97.8 °F (36.6 °C) 113 18 101/53 99 %      Temp src Heart Rate Source Patient Position BP Location FiO2 (%)   -- -- -- -- --              Physical Exam   Constitutional: She is oriented to person, place, and time and well-developed, well-nourished, and in no distress. No distress.   HENT:   Head: Normocephalic and atraumatic.   Eyes: EOM are normal. Pupils are equal, round, and reactive to light.   Neck: Normal range of motion. Neck supple.   Pt has a mildly hoarse voice   Cardiovascular: An irregularly irregular rhythm present. Tachycardia present.    Murmur heard.   Systolic (possible) murmur is present with a grade of 2/6   Pulmonary/Chest: Effort normal and breath sounds normal. No respiratory distress.   Pt becomes mildly SOA with exertion   Abdominal: Soft. There is no tenderness. There is no rebound and no guarding.   Musculoskeletal: Normal range of motion. She exhibits edema (Trace, R tibia).   Neurological: She is  alert and oriented to person, place, and time. She has normal sensation and normal strength.   Skin: Skin is warm and dry. No rash noted.   Psychiatric: Mood and affect normal.   Nursing note and vitals reviewed.      LAB RESULTS  Lab Results (last 24 hours)     Procedure Component Value Units Date/Time    CBC & Differential [[id number]] Collected:  04/02/17 1746    Specimen:  Blood Updated:  04/02/17 1752    Narrative:       The following orders were created for panel order CBC & Differential.  Procedure                               Abnormality         Status                     ---------                               -----------         ------                     Scan Slide[[id number]]                                        In process                 CBC Auto Differential[[id number]]                             In process                   Please view results for these tests on the individual orders.    Comprehensive Metabolic Panel [[id number]] Collected:  04/02/17 1746    Specimen:  Blood Updated:  04/02/17 1747    Protime-INR [[id number]]  (Abnormal) Collected:  04/02/17 1746    Specimen:  Blood Updated:  04/02/17 1801     Protime 21.7 (H) Seconds      INR 1.97 (H)    aPTT [[id number]]  (Abnormal) Collected:  04/02/17 1746    Specimen:  Blood Updated:  04/02/17 1801     PTT 40.6 (H) seconds     CBC Auto Differential [[id number]] Collected:  04/02/17 1746    Specimen:  Blood Updated:  04/02/17 1747    Scan Slide [[id number]] Collected:  04/02/17 1746    Specimen:  Blood Updated:  04/02/17 1752          I ordered the above labs and reviewed the results    RADIOLOGY  CT Abdomen Pelvis With Contrast    (Results Pending)        I ordered the above noted radiological studies. Interpreted by radiologist. Discussed with radiologist. Reviewed by me in PACS.     PROCEDURES  Procedures      PROGRESS AND CONSULTS  ED Course   5:06 PM:  Vitals: BP: 101/53 HR: 113 Temp: 97.8 °F (36.6 °C) O2 sat: 99%  D/w pt plan for labs, CT Abd Pelvis,  and EKG for further evaluation. Ordered IVF for hydration. Pt understands and agrees with the plan, all questions answered.    5:26 PM:  Discussed pt's case with Dr. Barrow who, after a bedside evaluation, agrees with the course of care.    6:00 PM:  Pt care turned over to GANGA Giles. Labs and CT Abd Pelvis results pending.     MEDICAL DECISION MAKING  Results were reviewed/discussed with the patient and they were also made aware of online access. Pt also made aware that some labs, such as cultures, will not be resulted during ER visit and follow up with PMD is necessary.     MDM  Number of Diagnoses or Management Options     Amount and/or Complexity of Data Reviewed  Clinical lab tests: ordered  Tests in the radiology section of CPT®: ordered  Tests in the medicine section of CPT®: ordered  Decide to obtain previous medical records or to obtain history from someone other than the patient: yes  Review and summarize past medical records: yes (Pt was admitted on 3/11 secondary to C-diff and was discharged on 3/22 with oral Vancomycin. Her Hemoglobin was 9.8 and calcium was 8.1. Dr. Robbins (Cardiology) consulted secondary to A-fib. Dr. Squires is her Pulmonologist.)    Patient Progress  Patient progress: stable         DIAGNOSIS  Final diagnoses:   None       DISPOSITION  Pt care turned over to GANGA Giles, disposition pending lab and imaging results.    Latest Documented Vital Signs:  As of 6:07 PM  BP- 101/53 HR- 113 Temp- 97.8 °F (36.6 °C) O2 sat- 99%    --  Documentation assistance provided by danii De Leon for PJ Lagunas.  Information recorded by the danii was done at my direction and has been verified and validated by me.     Roel De Leon  04/02/17 1807       PJ Armstrong  04/03/17 9704

## 2017-04-02 NOTE — ED PROVIDER NOTES
I supervised care provided by the midlevel provider.    We have discussed this patient's history, physical exam, and treatment plan.   I have reviewed the note and personally saw and examined the patient and agree with the plan of care.    Pt with N/V/D over the past 3 days. She has a hx of C-diff and was previously on Vancomycin, which she has finished.    On exam, there is moderate LLQ tenderness. She is A&Ox3 and in NAD.    Pt will be admitted for abx and pain control.    Documentation assistance provided by danii Newsome for Dr. Barrow.  Information recorded by the scribe was done at my direction and has been verified and validated by me.             Michael Newsome  04/02/17 3068       Julian Barrow MD  04/02/17 2459       Julian Barrow MD  04/02/17 1769

## 2017-04-02 NOTE — ED PROVIDER NOTES
1800: Pt care turned over from Pb Lagunas (APRN).    1901: Ordered oral Vancomycin to begin C.diff treatment.     1930: Discussed diagnosis of C.diff with patient and plan to admit for further evaluation. Pt was recently admitted 03/11/17 - 03/20/17 for C.diff. At that time, she was seen in consult by Dr. Landry (ID) and was also found to have ascites.   BP: 98/60 HR: 97 Temp: 98.7 °F (37.1 °C) (Oral) O2 sat: 96%    2000: Discussed pt's case with Dr. Hubbard (internal medicine), who agrees to admit pt.     I supervised care provided by the midlevel provider.    We have discussed this patient's history, physical exam, and treatment plan.   I have reviewed the note and personally saw and examined the patient and agree with the plan of care.    Documentation assistance provided by danii Moon.  Information recorded by the scribe was done at my direction and has been verified and validated by me.       Melissa Moon  04/02/17 9209       GANGA Blancas III  04/06/17 1408

## 2017-04-03 PROBLEM — R82.71 ASYMPTOMATIC BACTERIURIA: Status: ACTIVE | Noted: 2017-04-03

## 2017-04-03 LAB
ANION GAP SERPL CALCULATED.3IONS-SCNC: 9.7 MMOL/L
BASOPHILS # BLD AUTO: 0.02 10*3/MM3 (ref 0–0.2)
BASOPHILS NFR BLD AUTO: 0.1 % (ref 0–1.5)
BUN BLD-MCNC: 17 MG/DL (ref 8–23)
BUN/CREAT SERPL: 24.6 (ref 7–25)
CALCIUM SPEC-SCNC: 8.2 MG/DL (ref 8.6–10.5)
CHLORIDE SERPL-SCNC: 95 MMOL/L (ref 98–107)
CO2 SERPL-SCNC: 26.3 MMOL/L (ref 22–29)
CREAT BLD-MCNC: 0.69 MG/DL (ref 0.57–1)
DEPRECATED RDW RBC AUTO: 60.2 FL (ref 37–54)
EOSINOPHIL # BLD AUTO: 0.04 10*3/MM3 (ref 0–0.7)
EOSINOPHIL NFR BLD AUTO: 0.3 % (ref 0.3–6.2)
ERYTHROCYTE [DISTWIDTH] IN BLOOD BY AUTOMATED COUNT: 17.2 % (ref 11.7–13)
GFR SERPL CREATININE-BSD FRML MDRD: 81 ML/MIN/1.73
GLUCOSE BLD-MCNC: 99 MG/DL (ref 65–99)
HCT VFR BLD AUTO: 26.8 % (ref 35.6–45.5)
HGB BLD-MCNC: 8.6 G/DL (ref 11.9–15.5)
IMM GRANULOCYTES # BLD: 0.04 10*3/MM3 (ref 0–0.03)
IMM GRANULOCYTES NFR BLD: 0.3 % (ref 0–0.5)
LYMPHOCYTES # BLD AUTO: 0.5 10*3/MM3 (ref 0.9–4.8)
LYMPHOCYTES NFR BLD AUTO: 3.5 % (ref 19.6–45.3)
MCH RBC QN AUTO: 30.5 PG (ref 26.9–32)
MCHC RBC AUTO-ENTMCNC: 32.1 G/DL (ref 32.4–36.3)
MCV RBC AUTO: 95 FL (ref 80.5–98.2)
MONOCYTES # BLD AUTO: 0.9 10*3/MM3 (ref 0.2–1.2)
MONOCYTES NFR BLD AUTO: 6.3 % (ref 5–12)
NEUTROPHILS # BLD AUTO: 12.86 10*3/MM3 (ref 1.9–8.1)
NEUTROPHILS NFR BLD AUTO: 89.5 % (ref 42.7–76)
PLATELET # BLD AUTO: 235 10*3/MM3 (ref 140–500)
PMV BLD AUTO: 9.9 FL (ref 6–12)
POTASSIUM BLD-SCNC: 3.8 MMOL/L (ref 3.5–5.2)
RBC # BLD AUTO: 2.82 10*6/MM3 (ref 3.9–5.2)
SODIUM BLD-SCNC: 131 MMOL/L (ref 136–145)
WBC NRBC COR # BLD: 14.36 10*3/MM3 (ref 4.5–10.7)

## 2017-04-03 PROCEDURE — 94799 UNLISTED PULMONARY SVC/PX: CPT

## 2017-04-03 PROCEDURE — 99223 1ST HOSP IP/OBS HIGH 75: CPT | Performed by: INTERNAL MEDICINE

## 2017-04-03 PROCEDURE — 25010000002 ONDANSETRON PER 1 MG: Performed by: INTERNAL MEDICINE

## 2017-04-03 PROCEDURE — 85025 COMPLETE CBC W/AUTO DIFF WBC: CPT | Performed by: INTERNAL MEDICINE

## 2017-04-03 PROCEDURE — 80048 BASIC METABOLIC PNL TOTAL CA: CPT | Performed by: INTERNAL MEDICINE

## 2017-04-03 PROCEDURE — 94640 AIRWAY INHALATION TREATMENT: CPT

## 2017-04-03 PROCEDURE — 99221 1ST HOSP IP/OBS SF/LOW 40: CPT | Performed by: INTERNAL MEDICINE

## 2017-04-03 RX ORDER — IPRATROPIUM BROMIDE AND ALBUTEROL SULFATE 2.5; .5 MG/3ML; MG/3ML
3 SOLUTION RESPIRATORY (INHALATION)
Status: DISCONTINUED | OUTPATIENT
Start: 2017-04-03 | End: 2017-04-11 | Stop reason: HOSPADM

## 2017-04-03 RX ORDER — ALBUTEROL SULFATE 2.5 MG/3ML
SOLUTION RESPIRATORY (INHALATION)
Status: COMPLETED
Start: 2017-04-03 | End: 2017-04-03

## 2017-04-03 RX ORDER — ALBUTEROL SULFATE 2.5 MG/3ML
2.5 SOLUTION RESPIRATORY (INHALATION) EVERY 6 HOURS PRN
Status: DISCONTINUED | OUTPATIENT
Start: 2017-04-03 | End: 2017-04-11 | Stop reason: HOSPADM

## 2017-04-03 RX ADMIN — VANCOMYCIN 250 MG: KIT at 06:43

## 2017-04-03 RX ADMIN — VITAMIN D, TAB 1000IU (100/BT) 1000 UNITS: 25 TAB at 09:12

## 2017-04-03 RX ADMIN — ACETYLCYSTEINE 4 ML: 200 SOLUTION ORAL; RESPIRATORY (INHALATION) at 09:46

## 2017-04-03 RX ADMIN — ALBUTEROL SULFATE 2.5 MG: 2.5 SOLUTION RESPIRATORY (INHALATION) at 09:46

## 2017-04-03 RX ADMIN — FIDAXOMICIN 200 MG: 200 TABLET, FILM COATED ORAL at 20:58

## 2017-04-03 RX ADMIN — FIDAXOMICIN 200 MG: 200 TABLET, FILM COATED ORAL at 10:37

## 2017-04-03 RX ADMIN — DILTIAZEM HYDROCHLORIDE 360 MG: 180 CAPSULE, COATED, EXTENDED RELEASE ORAL at 09:12

## 2017-04-03 RX ADMIN — BUDESONIDE AND FORMOTEROL FUMARATE DIHYDRATE 2 PUFF: 160; 4.5 AEROSOL RESPIRATORY (INHALATION) at 20:48

## 2017-04-03 RX ADMIN — ACETYLCYSTEINE 4 ML: 200 SOLUTION ORAL; RESPIRATORY (INHALATION) at 12:30

## 2017-04-03 RX ADMIN — ACETYLCYSTEINE 4 ML: 200 SOLUTION ORAL; RESPIRATORY (INHALATION) at 17:23

## 2017-04-03 RX ADMIN — ONDANSETRON 4 MG: 2 INJECTION INTRAMUSCULAR; INTRAVENOUS at 01:15

## 2017-04-03 RX ADMIN — IPRATROPIUM BROMIDE AND ALBUTEROL SULFATE 3 ML: .5; 3 SOLUTION RESPIRATORY (INHALATION) at 20:48

## 2017-04-03 RX ADMIN — FAMOTIDINE 20 MG: 20 TABLET, FILM COATED ORAL at 09:12

## 2017-04-03 RX ADMIN — ATORVASTATIN CALCIUM 40 MG: 40 TABLET, FILM COATED ORAL at 09:12

## 2017-04-03 RX ADMIN — IPRATROPIUM BROMIDE AND ALBUTEROL SULFATE 3 ML: .5; 3 SOLUTION RESPIRATORY (INHALATION) at 17:23

## 2017-04-03 RX ADMIN — ACETYLCYSTEINE 4 ML: 200 SOLUTION ORAL; RESPIRATORY (INHALATION) at 20:48

## 2017-04-03 RX ADMIN — SODIUM CHLORIDE 75 ML/HR: 9 INJECTION, SOLUTION INTRAVENOUS at 06:12

## 2017-04-03 RX ADMIN — VANCOMYCIN 250 MG: KIT at 01:44

## 2017-04-03 RX ADMIN — DABIGATRAN ETEXILATE MESYLATE 150 MG: 150 CAPSULE ORAL at 20:58

## 2017-04-03 RX ADMIN — DABIGATRAN ETEXILATE MESYLATE 150 MG: 150 CAPSULE ORAL at 00:15

## 2017-04-03 RX ADMIN — BUDESONIDE AND FORMOTEROL FUMARATE DIHYDRATE 2 PUFF: 160; 4.5 AEROSOL RESPIRATORY (INHALATION) at 09:46

## 2017-04-03 RX ADMIN — SODIUM CHLORIDE 75 ML/HR: 9 INJECTION, SOLUTION INTRAVENOUS at 20:00

## 2017-04-03 RX ADMIN — DABIGATRAN ETEXILATE MESYLATE 150 MG: 150 CAPSULE ORAL at 09:12

## 2017-04-03 RX ADMIN — IPRATROPIUM BROMIDE AND ALBUTEROL SULFATE 3 ML: .5; 3 SOLUTION RESPIRATORY (INHALATION) at 12:30

## 2017-04-03 NOTE — CONSULTS
"Referring Provider: Prince Hubbard MD  [address] [facility] [address]  [place] [address]    Reason for Consultation: Concern for recurrent C diff    History of present illness:  Agnieszka is an 87 YO who I am asked to evaluate and give opinion for recurrent C diff. History is obtained from the patient and review of the old and outside medical records which I summarize/synthesize as follows: She was first treated for C diff with Flagyl in February 2017. I met her in the hospital on 3/12/17 when she had her first recurrence. She was treated with 14 days of vancomycin with rather slow improvement. She started a probiotic after completing the vancomycin course on 3/25/17.     Around 3/31/17 she began having worsening diarrhea with episodes q2h hours. She had associated cramping diffuse abdominal pain and very poor PO intake. She had mild nausea and vomiting. There were no alleviating factors so she came to the ER on 4/2/17.    In the ER she was afebrile but tachycardic. She was again found to be in Afib. Labs were notable for WBC 16k. Her CT scan showed pan-colitis. She was started on empiric oral vancomycin and ID consulted. She continues to have watery, malodorous BMs every few hours.    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 LVQ - \"it just makes me sick\"  2. PCN (tolerates CTX) - rash > 60 years ago  3. Erythromycin (tolerates clarithromycin)    Medications:    Current Facility-Administered Medications:   •  acetaminophen (TYLENOL) tablet 650 mg, 650 mg, Oral, Q4H PRN, Prince Hubbard MD  •  acetylcysteine (MUCOMYST) 20 % solution 4 mL, 4 mL, Nebulization, 4x Daily - RT, Prince Hubbard MD  •  albuterol (PROVENTIL) (2.5 MG/3ML) 0.083% " nebulizer solution  - ADS Override Pull, , , ,   •  atorvastatin (LIPITOR) tablet 40 mg, 40 mg, Oral, Daily, Prince Hubbard MD, 40 mg at 04/03/17 0912  •  budesonide-formoterol (SYMBICORT) 160-4.5 MCG/ACT inhaler 2 puff, 2 puff, Inhalation, BID - RT, Prince Hubbard MD  •  cholecalciferol (VITAMIN D3) tablet 1,000 Units, 1,000 Units, Oral, Daily, Prince Hubbard MD, 1,000 Units at 04/03/17 0912  •  dabigatran etexilate (PRADAXA) capsule 150 mg, 150 mg, Oral, Q12H, Prince Hubbard MD, 150 mg at 04/03/17 0912  •  diltiaZEM CD (CARDIZEM CD) 24 hr capsule 360 mg, 360 mg, Oral, Q24H, Prince Hubbard MD, 360 mg at 04/03/17 0912  •  famotidine (PEPCID) tablet 20 mg, 20 mg, Oral, Daily, Prince Hubbard MD, 20 mg at 04/03/17 0912  •  losartan (COZAAR) tablet 25 mg, 25 mg, Oral, Q24H, Prince Hubbard MD  •  ondansetron (ZOFRAN) tablet 4 mg, 4 mg, Oral, Q6H PRN **OR** ondansetron ODT (ZOFRAN-ODT) disintegrating tablet 4 mg, 4 mg, Oral, Q6H PRN **OR** ondansetron (ZOFRAN) injection 4 mg, 4 mg, Intravenous, Q6H PRN, Prince Hubbard MD, 4 mg at 04/03/17 0115  •  sodium chloride 0.9 % flush 1-10 mL, 1-10 mL, Intravenous, PRN, Prince Hubbard MD  •  Insert peripheral IV, , , Once **AND** sodium chloride 0.9 % flush 10 mL, 10 mL, Intravenous, PRN, PJ Armstrong  •  sodium chloride 0.9 % infusion, 75 mL/hr, Intravenous, Continuous, PJ Armstrong, Last Rate: 75 mL/hr at 04/03/17 0612, 75 mL/hr at 04/03/17 0612  •  vancomycin oral solution 250 mg, 250 mg, Oral, Q6H, Prince Hubbard MD, 250 mg at 04/03/17 0643      Review of Systems  All systems were reviewed and are negative unless otherwise stated above in the HPI    Objective   Vital Signs   Temp:  [97.8 °F (36.6 °C)-99.5 °F (37.5 °C)] 99.5 °F (37.5 °C)  Heart Rate:  [] 116  Resp:  [18-20] 18  BP: ()/(51-59) 89/51    Physical Exam:   General: awake, alert, chronically ill appearing   Head: Normocephalic,  atraumatic  Eyes: R eye ptosis, PERRL,  no scleral icterus  ENT: MMM, OP clear, no thrush.   Neck: Supple, no visible thyromegaly  Cardiovascular: tachy irreg irreg, no murmurs, rubs, or gallops; no LE edema  Respiratory: Lungs are clear to ascultation bilaterally, no rales or wheezing; normal work of breathing on ambient air  GI: Abdomen is soft, mild TTP, mildly distended, normal bowel sounds in all four quadrants; no hepatosplenomegaly, no masses palpated  : no Long catheter present  Musculoskeletal: no joint abnormalities, normal musculature  Skin: No rashes, lesions, or embolic phenomenon  Neurological: Alert and oriented x 3, cranial nerves 2-12 grossly intact, motor strength 4/5 in all four extremities  Psychiatric: Normal mood and affect   Lymph: no pre-auricular, post-auricular, submandibular, cervical, supraclavicular  LAD  Vasc: no cyanosis; PIV w/o erythema    Labs:     Lab Results   Component Value Date    WBC 14.36 (H) 04/03/2017    HGB 8.6 (L) 04/03/2017    HCT 26.8 (L) 04/03/2017    MCV 95.0 04/03/2017     04/03/2017       Lab Results   Component Value Date    GLUCOSE 99 04/03/2017    BUN 17 04/03/2017    CREATININE 0.69 04/03/2017    EGFRIFNONA 81 04/03/2017    BCR 24.6 04/03/2017    CO2 26.3 04/03/2017    CALCIUM 8.2 (L) 04/03/2017    ALBUMIN 3.10 (L) 04/02/2017    LABIL2 1.1 04/02/2017    AST 18 04/02/2017    ALT 15 04/02/2017       Microbiology:  BCx: NGTD  UCx: 100k GNR    Radiology (personally reviewed images/report):  CT w/ pan-colitis    Assessment/Plan   1. Recurrent C difficile  -check CDiff PCR to confirm  -stop vancomycin  -start Dificid 200 mg PO BID x 10 days  -I am going to place an outpatient consult to [facility] for stool transplant as the procedure is not currently offered here at [facility]     2. 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 Hyponatremia    4. Asymptomatic bacteriuria  -UA with WBCs; no urinary symptoms; no role for treatment    5. Chronic Afib     Thank you for this consult. ID will follow.

## 2017-04-03 NOTE — H&P
Patient Care Team:  Matt Rose MD as PCP - General (Family Medicine)  Marcie Robbins MD as Consulting Physician (Cardiology)  Nilesh Danielle MD as Consulting Physician (Urology)    Chief complaint: diarrhea, abdominal pain    Subjective     Vomiting    Associated symptoms include chills and diarrhea. Pertinent negatives include no fever.   Nausea   Associated symptoms include chills, nausea and vomiting. Pertinent negatives include no fever.   Diarrhea    Associated symptoms include chills and vomiting. Pertinent negatives include no fever.     Very pleasant 87 yo female with complicated PMH including Afib, CHF, CAD, pulmonary aspergillosis, and recurrent Cdiff infection. She was discharged 3/20 on PO vanc and finished treatment about ~3/26 I believe. She also had short hospitalization for CHF discharged on 3/24.     Starting about 3-4 days ago she began having diarrhea again. Progressively worsened to ~15 BM/Day. Also associated with abdominal cramping similar to previous Cdiff. Came to [facility] ER. Due to suspicion for Cdiff started on po vanc and some IVFs. Feels a little better. She has been hypotensive.    Review of Systems   Constitutional: Positive for chills. Negative for fever.   HENT: Negative.    Eyes: Negative.    Respiratory: Negative.    Cardiovascular: Negative.    Gastrointestinal: Positive for diarrhea, nausea and vomiting.   Endocrine: Negative.    Genitourinary: Negative.    Musculoskeletal: Negative.    Skin: Negative.    Allergic/Immunologic: Negative.    Neurological: Negative.    Hematological: Negative.    Psychiatric/Behavioral: Negative.     Mild leg swelling, no history of blood clots    Past Medical History:   Diagnosis Date   • Asthma    • Atrial fibrillation    • Atrial flutter    • Bronchiectasis    • C. difficile diarrhea 3/11/2017   • CAD (coronary artery disease)     nonobstructive   • Colitis    • Cough    • Cryoglobulinemia    • Diastolic heart failure    • Fall    •  Hyperlipidemia    • Hypertension    • Hyponatremia    • Hypoxia    • Infectious viral hepatitis     AGE 13   • Leg swelling    • Lesion of lung    • MR (mitral regurgitation)     mild   • MVP (mitral valve prolapse)    • Permanent atrial fibrillation    • Pneumonia with the fungal infection aspergillosis 1/6/2017   • SOB (shortness of breath)    • UTI (urinary tract infection)    • Wheeze     mild     Past Surgical History:   Procedure Laterality Date   • BRONCHOSCOPY N/A 11/12/2016    Procedure: BRONCHOSCOPY WITH FLUORO, BRUSHINGS, BAL, AND BIOPSIES;  Surgeon: Rogelio Tucker MD;  Location: [facility] ENDOSCOPY;  Service:    • CATARACT EXTRACTION EXTRACAPSULAR W/ INTRAOCULAR LENS IMPLANTATION     • COLONOSCOPY      2013   • D&C WITH SUCTION     • HYSTERECTOMY     • KNEE ARTHROSCOPY Left      Family History   Problem Relation Age of Onset   • Hypertension Mother    • Hypertension Father    • Stroke Father    • Cancer Son      Social History   Substance Use Topics   • Smoking status: Never Smoker   • Smokeless tobacco: Never Used   • Alcohol use No     Prescriptions Prior to Admission   Medication Sig Dispense Refill Last Dose   • acetylcysteine (MUCOMYST) 20 % nebulizer solution Take 4 mL by nebulization 4 (Four) Times a Day. 500 mL 1 Taking   • ADVAIR -21 MCG/ACT inhaler Inhale 2 puffs 2 (Two) Times a Day. 1 inhaler 0 Taking   • atorvastatin (LIPITOR) 40 MG tablet Take 1 tablet by mouth Daily. 90 tablet 3 Taking   • B Complex-C (SUPER B COMPLEX PO) Take  by mouth.   Taking   • cholecalciferol (VITAMIN D3) 1000 UNITS tablet Take 1,000 Units by mouth Daily.   Taking   • dabigatran etexilate (PRADAXA) 150 MG capsu Take 1 capsule by mouth Every 12 (Twelve) Hours. 180 capsule 3 Taking   • diltiaZEM CD (CARDIZEM CD) 360 MG 24 hr capsule TAKE ONE CAPSULE BY MOUTH DAILY 90 capsule 3    • ezetimibe (ZETIA) 10 MG tablet Take 1 tablet by mouth Daily. 90 tablet 3 Taking   • furosemide (LASIX) 20 MG tablet Take 20 mg by mouth 2  (Two) Times a Day.   Taking   • losartan (COZAAR) 50 MG tablet 50 mg 2 (Two) Times a Day.   Taking   • Multiple Vitamin (MULTIVITAMIN) tablet Take 1 tablet by mouth Daily.   Taking   • pantoprazole (PROTONIX) 40 MG EC tablet Take 1 tablet by mouth Daily. 30 tablet 1 Taking   • VERAMYST 27.5 MCG/SPRAY nasal spray 1 spray into each nostril Daily.   Taking     Allergies:  Amlodipine besylate; Aspirin; Bactrim [sulfamethoxazole-trimethoprim]; Erythromycin; Levaquin [levofloxacin]; Macrobid [nitrofurantoin]; Penicillins; and Ramipril    Objective      Vital Signs  Temp:  [97.8 °F (36.6 °C)-98.7 °F (37.1 °C)] 98.7 °F (37.1 °C)  Heart Rate:  [] 97  Resp:  [18-20] 18  BP: ()/(53-56) 95/54    Physical Exam   Constitutional: She is oriented to person, place, and time. She appears well-developed and well-nourished. No distress.   elderly   HENT:   Head: Normocephalic and atraumatic.   Mouth/Throat: Oropharynx is clear and moist.   Eyes: EOM are normal. Pupils are equal, round, and reactive to light. No scleral icterus.   Neck: Normal range of motion. Neck supple.   Cardiovascular:   irregular   Pulmonary/Chest: Effort normal. No respiratory distress.   Abdominal: Soft. Bowel sounds are normal. She exhibits no distension. There is tenderness (mild llq).   Genitourinary:   Genitourinary Comments: Deferred    Musculoskeletal: She exhibits no edema or deformity.   Neurological: She is alert and oriented to person, place, and time. No cranial nerve deficit.   Skin: Skin is warm and dry. No rash noted.   Psychiatric: She has a normal mood and affect. Her behavior is normal. Thought content normal.   Nursing note and vitals reviewed.  Ext- no clubbing or cyanosis    Results Review:   I reviewed the patient's new clinical results.     Urinalysis With / Culture If Indicated [[id number]] (Abnormal) Collected: 04/02/17 4252       Lab Status: Final result Specimen: Urine from Urine, Catheter Updated: 04/02/17 6238        Color,  UA Yellow        Appearance, UA Clear        pH, UA 6.5        Specific Rebuck, UA 1.016        Glucose, UA Negative        Ketones, UA Negative        Bilirubin, UA Negative        Blood, UA Negative        Protein, UA Negative        Leuk Esterase, UA Small (1+) (A)        Nitrite, UA Negative        Urobilinogen, UA 0.2 E.U./dL       Lactic Acid, Plasma [[id number]] (Normal) Collected: 04/02/17 1851       Lab Status: Final result Specimen: Blood Updated: 04/02/17 1910        Lactate 1.3 mmol/L        Urinalysis, Microscopic Only [[id number]] (Abnormal) Collected: 04/02/17 1851       Lab Status: Final result Specimen: Urine from Urine, Catheter Updated: 04/02/17 1904        RBC, UA 0-2 /HPF         WBC, UA 6-12 (A) /HPF         Bacteria, UA 4+ (A) /HPF         Squamous Epithelial Cells, UA 0-2 /HPF         Hyaline Casts, UA 3-6 /LPF         Methodology Automated Microscopy       Urine Culture [[id number]] Collected: 04/02/17 1851       Lab Status: In process Specimen: Urine from Urine, Catheter Updated: 04/02/17 1901       Clostridium Difficile Toxin, PCR [[id number]]        Lab Status: No result Specimen: Stool from Per Rectum        Comprehensive Metabolic Panel [[id number]] (Abnormal) Collected: 04/02/17 1746       Lab Status: Final result Specimen: Blood Updated: 04/02/17 1812        Glucose 121 (H) mg/dL         BUN 13 mg/dL         Creatinine 0.84 mg/dL         Sodium 129 (L) mmol/L         Potassium 3.8 mmol/L         Chloride 91 (L) mmol/L         CO2 24.3 mmol/L         Calcium 8.9 mg/dL         Total Protein 5.9 (L) g/dL         Albumin 3.10 (L) g/dL         ALT (SGPT) 15 U/L         AST (SGOT) 18 U/L         Alkaline Phosphatase 66 U/L         Total Bilirubin 1.3 (H) mg/dL         eGFR Non African Amer 64 mL/min/1.73         Globulin 2.8 gm/dL         A/G Ratio 1.1 g/dL         BUN/Creatinine Ratio 15.5        Anion Gap 13.7 mmol/L        Narrative:         The MDRD GFR formula is only valid for adults with  stable renal function between ages 18 and 70.       Protime-INR [[id number]] (Abnormal) Collected: 04/02/17 1746       Lab Status: Final result Specimen: Blood Updated: 04/02/17 1801        Protime 21.7 (H) Seconds         INR 1.97 (H)       aPTT [[id number]] (Abnormal) Collected: 04/02/17 1746       Lab Status: Final result Specimen: Blood Updated: 04/02/17 1801        PTT 40.6 (H) seconds        CBC Auto Differential [[id number]] (Abnormal) Collected: 04/02/17 1746       Lab Status: Final result Specimen: Blood Updated: 04/02/17 1812        WBC 16.72 (H) 10*3/mm3         RBC 3.09 (L) 10*6/mm3         Hemoglobin 9.5 (L) g/dL         Hematocrit 29.6 (L) %         MCV 95.8 fL         MCH 30.7 pg         MCHC 32.1 (L) g/dL         RDW 17.4 (H) %         RDW-SD 60.6 (H) fl         MPV 10.7 fL         Platelets 263 10*3/mm3         Neutrophil % 90.0 (H) %         Lymphocyte % 4.1 (L) %       CT Abdomen Pelvis With Contrast [[id number]] Not Reviewed        Order Status: Completed Collected: 04/02/17 1958        Updated: 04/02/17 1958       Narrative:         CT ABDOMEN AND PELVIS WITH INTRAVENOUS CONTRAST     HISTORY: Diarrhea. History of C. difficile colitis.     TECHNIQUE: CT abdomen and pelvis with intravenous contrast.     COMPARISON: CT abdomen and pelvis without contrast 03/11/2017.     FINDINGS: There is diffuse colonic wall thickening seen from the cecum  to the rectum consistent with pancolitis. Perirectal stranding is  present.  There is mild ascites with free fluid extending into the  pelvis. The amount of free fluid is increased when compared to  examination of 03/11/2017. There is no evidence for bowel obstruction.     The heart size is enlarged and there is a pericardial effusion which  measures 12 mm in thickness lateral to the right atrium and this is  without change. Basilar atelectasis is noted. There are several hepatic  low density lesions which are most likely cysts and are without change.  Several of these  are too small to definitively characterize. Splenic  size is normal. Adrenal glands, pancreas, and right kidney appear  normal. There is a left anterior renal exophytic cyst measuring 7.5 cm.  Increased atherosclerotic calcification is present involving the  abdominal aorta and iliac vasculature.          Impression:         1.  Pancolitis. Mild ascites.  2. Multiple hepatic low-density lesions without change. The larger of  these are consistent with cysts, but several lesions are too small to  characterize. Large left renal cyst.  3.  No interval change in pericardial effusion.                Assessment/Plan     Principal Problem:    C. difficile colitis  Active Problems:    Chronic atrial fibrillation    DNR (do not resuscitate)    Sepsis    Chronic diastolic CHF (congestive heart failure)    Pancolitis    -PO vanc for Cdiff. ID consult for potential referal for stool transplant (risk/benefit hard to say with age but recurrent infection certainly serious), plus potentially the ability to give outpatient vanc if has recurrence to avoid hospitalization.    -Continue home a/c for Afib.   -Gentle IVFs with dehydration/hypotension. Much caution with history of CHF, as well as history of SIADH. Monitor volume status and BMP. Potentially as early as tomorrow may stop fluids and switch to diuretics  -DNR/DNI- confirmed with PT and will order.    Assessment & Plan    I discussed the patients findings and my recommendations with patient, family and consulting provider  Reviewed previous records  Prince Hubbard MD  04/02/17  9:11 PM

## 2017-04-03 NOTE — PROGRESS NOTES
Mucomyst breathing treatments ordered QID without a bronchodilator, so treatment was not started. Note left for MD to order bronchodilator. Thanks, Denice Hernández CRT

## 2017-04-03 NOTE — PROGRESS NOTES
Discharge Planning Assessment  [facility]     Patient Name: Agnieszka Rizzo  MRN: [id number]  Today's Date: 4/3/2017    Admit Date: 4/2/2017          Discharge Needs Assessment       04/03/17 0995    Living Environment    Lives With alone    Living Arrangements house    Quality Of Family Relationships supportive    Able to Return to Prior Living Arrangements yes    Discharge Needs Assessment    Concerns To Be Addressed no discharge needs identified    Anticipated Changes Related to Illness none    Equipment Currently Used at Home walker, rolling;cane, straight;oxygen   Does not use the O2.  Usually does not use the walker, but has used it this weekend because she is so weak.    Equipment Needed After Discharge none    Transportation Available car;family or friend will provide    Discharge Planning Comments Eros Zaldivar [phone]            Discharge Plan       04/03/17 0913    Case Management/Social Work Plan    Plan Return home    Patient/Family In Agreement With Plan yes    Additional Comments Spoke with patient at bedside.  Patient lives alone, is normally IADL.  She states her daughter has arranged for someone to help her MWF from 9-1 because of being weak.  She has used [facility] in the past but is not current.  She has been to [facility] in the past for rehab.  Patient has a cane, walker and O2 that she does not normally use.  She plans to return home at DC and does not anticipate any DC needs. CCP will follow as needed.        Discharge Placement     No information found                Demographic Summary       04/03/17 0955    Referral Information    Admission Type inpatient    Arrived From admitted as an inpatient;home or self-care    Referral Source admission list    Reason For Consult discharge planning    Record Reviewed history and physical    Primary Care Physician Information    Name Dr. Matt Rose            Functional Status       04/03/17 0995    Functional Status Current    Ambulation  2-->assistive person    Transferring 2-->assistive person    Toileting 2-->assistive person    Bathing 2-->assistive person    Dressing 2-->assistive person    Eating 0-->independent    Communication 0-->understands/communicates without difficulty    Change in Functional Status Since Onset of Current Illness/Injury no    Functional Status Prior    Ambulation 1-->assistive equipment    Transferring 1-->assistive equipment   Has cane and walker - does not use them normally, but using them this weekend because she was so weak.    Toileting 0-->independent    Bathing 0-->independent    Dressing 0-->independent    Eating 0-->independent    Communication 0-->understands/communicates without difficulty    IADL    Medications independent    Meal Preparation independent    Housekeeping independent    Laundry assistive person    Shopping assistive person    Oral Care independent    Activity Tolerance    Current Activity Limitations none    Usual Activity Tolerance good    Current Activity Tolerance moderate    Cognitive/Perceptual/Developmental    Current Mental Status/Cognitive Functioning no deficits noted    Recent Changes in Mental Status/Cognitive Functioning no changes            Psychosocial     None            Abuse/Neglect     None            Legal     None            Substance Abuse     None            Patient Forms     None          Becky S. [place], RN

## 2017-04-03 NOTE — PROGRESS NOTES
" LOS: 1 day   Primary Care Physician: Matt Rose MD     Subjective  No dysuria or difficulty voiding. Reports occasional stooling without noticing worse now. Diarrhea with watery, mucous stool. No melena. BLQ abd pain, non radiating and mild. Described as soreness. No CP SOA NV.    Vital Signs  Body mass index is 19.4 kg/(m^2).  Temp:  [97.8 °F (36.6 °C)-99.5 °F (37.5 °C)] 99.5 °F (37.5 °C)  Heart Rate:  [] 85  Resp:  [18-20] 18  BP: ()/(51-59) 89/51      Objective:  General Appearance:  Comfortable and in no acute distress.    Vital signs: (most recent): Blood pressure (!) 89/51, pulse 85, temperature 99.5 °F (37.5 °C), temperature source Oral, resp. rate 18, height 64\" (162.6 cm), weight 113 lb (51.3 kg), SpO2 94 %.  Vital signs are normal.    HEENT: Normal HEENT exam.    Lungs:  Normal respiratory rate and normal effort.  Breath sounds clear to auscultation.  No wheezes.    Heart: Normal rate.  Irregular rhythm.  No murmur.   Abdomen: Abdomen is soft and non-distended.  There is right lower quadrant and left lower quadrant tenderness. There is no rebound tenderness.  There is no guarding.     Extremities: Normal range of motion.    Pulses: Distal pulses are intact.    Neurological: Patient is alert and oriented to person, place and time.    Pupils:  Pupils are equal, round, and reactive to light.    Skin:  Warm and dry.                Results Review:    I reviewed the patient's new clinical results.  I personally viewed and interpreted the patient's EKG/Telemetry data      Results from last 7 days  Lab Units 04/03/17  0731 04/02/17  1746   WBC 10*3/mm3 14.36* 16.72*   HEMOGLOBIN g/dL 8.6* 9.5*   PLATELETS 10*3/mm3 235 263       Results from last 7 days  Lab Units 04/03/17  0731 04/02/17  1746   SODIUM mmol/L 131* 129*   POTASSIUM mmol/L 3.8 3.8   CHLORIDE mmol/L 95* 91*   TOTAL CO2 mmol/L 26.3 24.3   BUN mg/dL 17 13   CREATININE mg/dL 0.69 0.84   CALCIUM mg/dL 8.2* 8.9   GLUCOSE mg/dL 99 121* "       Results from last 7 days  Lab Units 04/02/17  1746   INR  1.97*     Hemoglobin A1C:No results found for: HGBA1C    Glucose Range:No results found for: POCGLU    Medication Review: Yes    Physical Therapy:    Assessment/Plan     Active Hospital Problems (** Indicates Principal Problem)    Diagnosis Date Noted   • **C. difficile colitis [A04.7] 03/11/2017   • Asymptomatic bacteriuria [R82.71] 04/03/2017   • Pancolitis [K51.00] 04/02/2017   • DNR (do not resuscitate) [Z66] 03/12/2017   • Chronic diastolic CHF (congestive heart failure) [I50.32] 03/12/2017   • Sepsis [A41.9] 03/12/2017   • Chronic atrial fibrillation [I48.2] 12/01/2016      Resolved Hospital Problems    Diagnosis Date Noted Date Resolved   No resolved problems to display.       Assessment & Plan  -CDiff diarrhea: ID following. Changed to dificid for CDiff treatment. Repeat stool pcr pending. Outpt UL referral for fecal transplant.  -ASx Bacteriuria: No urinary symptoms and GNR on UCx. No need for antibiotics  -MAC with Bronchiectasis: Off antibiotics due to recurrent CDiff.   -Chronic AFib: Cardiology following. BP now stable and improved rate. Diltiazem, Pradaxa. Monitor Hgb.  -Hx of SIADH noted, Sodium stable with fluids so far. Will monitor.    Disposition: TBD    Piyush Simpson MD  04/03/17  1:10 PM

## 2017-04-03 NOTE — CONSULTS
Patient Name: Agnieszka Rizzo  :1930  86 y.o.    Date of Admission: 2017  Encounter Provider: Marcie Rbobins MD  Date of Encounter Visit: 17  Place of Service: [facility] CARDIOLOGY  Referring Provider: Prince Hubbard MD  Patient Care Team:  Matt Rose MD as PCP - General (Family Medicine)  Marcie Robbins MD as Consulting Physician (Cardiology)  Nilesh Danielle MD as Consulting Physician (Urology)      Chief complaint: CHF      History of Present Illness:  This is a 86 year old female with hx of cryoglobulinemia, atrial fibrillation- on Pradaxa, and heart failure. It was noted that she has also been seen by Dr. Ana Goodrich with [facility] in the past for history of HTN, hyperlipidemia, mild mitral valve prolapse, and nonobstructive coronary artery disease that was diagnosed by a cardiac catheterization in . An echocardiogram in 2016 showed normal left ventricular function being normal EF of 58%, left atrial cavity being moderately dilated with mild tricuspid and mitral valve regurgitation. Negative stress test in 2016. She has had multiple recent hospitalizations for pulmonary issues. She was actually just most recently hospitalized with C.Diff and CHF exacerbation- she was discharged on 3/24/17 with her usual dose of lasix and her diltiazem was increased for better heart rate control.     On admission, WBC of 16.72. CT of abd/pelvis on 17 showed pancolitis, mild ascites.      She began having diarrhea with nausea and vomiting again.  She says the diarrhea was different and more mucousy than it has been in the past.  She has been having some dizziness and unsteadiness on her feet.  She denies chest pain.  She feels her breathing is at baseline.  She denies shortness of breath or lower extremity edema.  Since being in the hospital, she has been tachycardic and hypotensive.  She is receiving IV  fluids.      CT abd/pelvis on 4/2/17-  IMPRESSION:  1. Pancolitis. Mild ascites.  2. Multiple hepatic low-density lesions without change. The larger of  these are consistent with cysts, but several lesions are too small to  characterize. Large left renal cyst.  3. No interval change in pericardial effusion.     Previous Testing-  Cardioversion- 1/10/17  One shock of 120 J sync to the R-wave was delivered. She converted to sinus rhythm with frequent premature atrial contractions and then to normal sinus rhythm.   A few minutes later she returned to atrial fibrillation. I shocked her again with 120J sync to the R-wave. She continued in atrial fibrillation. I shocked her a third time at 150 J sync to the R-wave. She returned to normal sinus rhythm. However, she quickly converted back to atrial fibrillation.  Conclusions:Unsuccessful cardioversion to normal sinus rhythm.      Stress Test- 12/19/16  · Myocardial perfusion imaging indicates a normal myocardial perfusion study with no evidence of ischemia.  · Left ventricular ejection fraction is hyperdynamic (Calculated EF > 70%).  · Impressions are consistent with a low risk study.  · There is no prior study available for comparison.      Echo- 11/15/16  · All left ventricular wall segments contract normally.  · Left ventricular function is normal. Estimated EF = 58%.  · Left atrial cavity size is moderately dilated.  · Mild tricuspid valve regurgitation is present.  · RVSP(TR) 32.4 mmHg  · Mild mitral valve regurgitation is present      Past Medical History:   Diagnosis Date   • Asthma    • Atrial fibrillation    • Atrial flutter    • Bronchiectasis    • C. difficile diarrhea 3/11/2017   • CAD (coronary artery disease)     nonobstructive   • Colitis    • Cough    • Cryoglobulinemia    • Diastolic heart failure    • Fall    • Hyperlipidemia    • Hypertension    • Hyponatremia    • Hypoxia    • Infectious viral hepatitis     AGE 13   • Leg swelling    • Lesion of lung     • MR (mitral regurgitation)     mild   • MVP (mitral valve prolapse)    • Permanent atrial fibrillation    • Pneumonia with the fungal infection aspergillosis 1/6/2017   • SOB (shortness of breath)    • UTI (urinary tract infection)    • Wheeze     mild       Past Surgical History:   Procedure Laterality Date   • BRONCHOSCOPY N/A 11/12/2016    Procedure: BRONCHOSCOPY WITH FLUORO, BRUSHINGS, BAL, AND BIOPSIES;  Surgeon: Rogelio Tucker MD;  Location: [facility] ENDOSCOPY;  Service:    • CATARACT EXTRACTION EXTRACAPSULAR W/ INTRAOCULAR LENS IMPLANTATION     • COLONOSCOPY      2013   • D&C WITH SUCTION     • HYSTERECTOMY     • KNEE ARTHROSCOPY Left          Prior to Admission medications    Medication Sig Start Date End Date Taking? Authorizing Provider   acetylcysteine (MUCOMYST) 20 % nebulizer solution Take 4 mL by nebulization 4 (Four) Times a Day. 1/9/17  Yes Rogelio Tucker MD   ADVAIR -21 MCG/ACT inhaler Inhale 2 puffs 2 (Two) Times a Day. 1/3/17  Yes Gerhard Camilo MD   atorvastatin (LIPITOR) 40 MG tablet Take 1 tablet by mouth Daily. 1/19/17  Yes Marcie Robbins MD   B Complex-C (SUPER B COMPLEX PO) Take  by mouth.   Yes Nurse [facility] Emergency, RN   cholecalciferol (VITAMIN D3) 1000 UNITS tablet Take 1,000 Units by mouth Daily.   Yes Nurse [facility] Emergency, RN   dabigatran etexilate (PRADAXA) 150 MG capsu Take 1 capsule by mouth Every 12 (Twelve) Hours. 1/5/17  Yes Marcie Robbins MD   diltiaZEM CD (CARDIZEM CD) 360 MG 24 hr capsule TAKE ONE CAPSULE BY MOUTH DAILY 3/24/17  Yes Marcie Robbins MD   ezetimibe (ZETIA) 10 MG tablet Take 1 tablet by mouth Daily. 1/19/17  Yes Marcie Robbins MD   furosemide (LASIX) 20 MG tablet Take 20 mg by mouth 2 (Two) Times a Day.   Yes Historical Provider, MD   losartan (COZAAR) 50 MG tablet 50 mg 2 (Two) Times a Day. 6/3/16  Yes Historical Provider, MD   Multiple Vitamin (MULTIVITAMIN) tablet Take 1 tablet by mouth Daily.   Yes Historical Provider, MD   VERAMYST 27.5  MCG/SPRAY nasal spray 1 spray into each nostril Daily. 7/25/16  Yes Historical Provider, MD   pantoprazole (PROTONIX) 40 MG EC tablet Take 1 tablet by mouth Daily. 1/9/17   Rogelio Tucker MD       Allergies   Allergen Reactions   • Amlodipine Besylate Swelling   • Aspirin      Caused bleeding ulcers   • Bactrim [Sulfamethoxazole-Trimethoprim] Nausea And Vomiting   • Erythromycin    • Levaquin [Levofloxacin]    • Macrobid [Nitrofurantoin] Nausea Only   • Penicillins      Has tolerated ceftriaxone in the past   • Ramipril Other (See Comments)     Cough       Social History     Social History   • Marital status:      Spouse name: N/A   • Number of children: N/A   • Years of education: N/A     Social History Main Topics   • Smoking status: Never Smoker   • Smokeless tobacco: Never Used   • Alcohol use No   • Drug use: No   • Sexual activity: Yes     Partners: Male     Other Topics Concern   • None     Social History Narrative       Family History   Problem Relation Age of Onset   • Hypertension Mother    • Hypertension Father    • Stroke Father    • Cancer Son        REVIEW OF SYSTEMS:   All other systems reviewed and negative.       Objective:     Vitals:    04/02/17 2112 04/03/17 0011 04/03/17 0908 04/03/17 0946   BP: 112/59 107/51 (!) 89/51    BP Location: Right arm Left arm Right arm    Patient Position: Lying Lying Sitting    Pulse: 99 94 116 108   Resp: 18 20 18 18   Temp: 98 °F (36.7 °C) 98 °F (36.7 °C) 99.5 °F (37.5 °C)    TempSrc: Oral Oral Oral    SpO2: 95% 95% 94% 94%   Weight:  113 lb (51.3 kg)     Height:         Body mass index is 19.4 kg/(m^2).    Constitutional: She is oriented to person, place, and time. She appears well-developed. She does not appear ill.   HENT:   Head: Normocephalic and atraumatic. Head is without contusion.   Right Ear: Hearing normal. No drainage.   Left Ear: Hearing normal. No drainage.   Nose: No nasal deformity. No epistaxis.   Eyes: Lids are normal. Right eye exhibits no  exudate. Left eye exhibits no exudate.  Neck: No JVD present. Carotid bruit is not present. No tracheal deviation present. No thyroid mass and no thyromegaly present.   Cardiovascular: Tachycardic and irregularly irregular.  No significant edema.  Pulses:       Posterior tibial pulses are 2+ on the right side, and 2+ on the left side.   Pulmonary/Chest: Effort normal and breath sounds normal.   Abdominal: Soft. Normal appearance and bowel sounds are normal. There is no tenderness.   Musculoskeletal: Normal range of motion.        Right shoulder: She exhibits no deformity.        Left shoulder: She exhibits no deformity.   Neurological: She is alert and oriented to person, place, and time. She has normal strength.   Skin: Skin is warm, dry and intact. No rash noted.   Psychiatric: She has a normal mood and affect. Her behavior is normal. Thought content normal.   Vitals reviewed      Lab Review:       Results from last 7 days  Lab Units 04/03/17  0731 04/02/17  1746   SODIUM mmol/L 131* 129*   POTASSIUM mmol/L 3.8 3.8   CHLORIDE mmol/L 95* 91*   TOTAL CO2 mmol/L 26.3 24.3   BUN mg/dL 17 13   CREATININE mg/dL 0.69 0.84   CALCIUM mg/dL 8.2* 8.9   BILIRUBIN mg/dL  --  1.3*   ALK PHOS U/L  --  66   ALT (SGPT) U/L  --  15   AST (SGOT) U/L  --  18   GLUCOSE mg/dL 99 121*           Results from last 7 days  Lab Units 04/03/17  0731   WBC 10*3/mm3 14.36*   HEMOGLOBIN g/dL 8.6*   HEMATOCRIT % 26.8*   PLATELETS 10*3/mm3 235       Results from last 7 days  Lab Units 04/02/17  1746   INR  1.97*   APTT seconds 40.6*              Telemetry this am-      EKG on 4/2/17-        I personally viewed and interpreted the patient's EKG/Telemetry data.        Assessment and Plan:       1. Atrial fibrillation. She failed cardioversion in January 2017. She has a CHADS2-Vasc score of 5. She is anticoagulated with Pradaxa. She is generally rate controlled. She is not a candidate for beta blockers because of lung disease.  2. Chronic diastolic  heart failure.  Currently not volume overloaded.  3. Dyspnea on exertion. Multifactorial  4. Mitral valve prolapse with very mild mitral regurgitation.  5. Mild tricuspid regurgitation without pulmonary hypertension.  6. Nonobstructive coronary artery disease diagnosed by catheter in 2007. She has noted coronary artery calcification on CT scans. Nuclear stress 12/16 was normal.   7. Hypertension. Her blood pressure is controlled.  8. Hyperlipidemia. Zetia and atorvastatin  9. Hyponatremia. Stable.  10. C. difficile diarrhea. ID recs noted.   11. Anemia    - low BP. DC losartan. Needs dilt for rate control  - monitor anemia. On pradaxa  - monitor volume status with IVF      Marcie Robbins MD  04/03/17  10:14 AM

## 2017-04-03 NOTE — PLAN OF CARE
Problem: Patient Care Overview (Adult)  Goal: Plan of Care Review  Outcome: Ongoing (interventions implemented as appropriate)    04/03/17 0408   Coping/Psychosocial Response Interventions   Plan Of Care Reviewed With patient   Patient Care Overview   Progress no change   Outcome Evaluation   Outcome Summary/Follow up Plan Still need stool sample.        Goal: Adult Individualization and Mutuality  Outcome: Ongoing (interventions implemented as appropriate)  Goal: Discharge Needs Assessment  Outcome: Ongoing (interventions implemented as appropriate)    Problem: Fall Risk (Adult)  Goal: Identify Related Risk Factors and Signs and Symptoms  Outcome: Outcome(s) achieved Date Met:  04/03/17  Goal: Absence of Falls  Outcome: Ongoing (interventions implemented as appropriate)    Problem: Infection, Risk/Actual (Adult)  Goal: Identify Related Risk Factors and Signs and Symptoms  Outcome: Outcome(s) achieved Date Met:  04/03/17  Goal: Infection Prevention/Resolution  Outcome: Ongoing (interventions implemented as appropriate)

## 2017-04-04 DIAGNOSIS — A04.71 RECURRENT CLOSTRIDIUM DIFFICILE DIARRHEA: Primary | ICD-10-CM

## 2017-04-04 PROBLEM — D50.9 IRON DEFICIENCY ANEMIA: Status: ACTIVE | Noted: 2017-04-04

## 2017-04-04 PROBLEM — E87.6 HYPOKALEMIA: Status: ACTIVE | Noted: 2017-04-04

## 2017-04-04 LAB
ANION GAP SERPL CALCULATED.3IONS-SCNC: 8.7 MMOL/L
BACTERIA SPEC AEROBE CULT: ABNORMAL
BUN BLD-MCNC: 21 MG/DL (ref 8–23)
BUN/CREAT SERPL: 35.6 (ref 7–25)
C DIFF TOX GENS STL QL NAA+PROBE: POSITIVE
CALCIUM SPEC-SCNC: 7.9 MG/DL (ref 8.6–10.5)
CHLORIDE SERPL-SCNC: 96 MMOL/L (ref 98–107)
CHLORIDE UR-SCNC: 29 MMOL/L
CO2 SERPL-SCNC: 23.3 MMOL/L (ref 22–29)
CREAT BLD-MCNC: 0.59 MG/DL (ref 0.57–1)
CREAT UR-MCNC: 137 MG/DL
DEPRECATED RDW RBC AUTO: 60.3 FL (ref 37–54)
ERYTHROCYTE [DISTWIDTH] IN BLOOD BY AUTOMATED COUNT: 17.4 % (ref 11.7–13)
FOLATE SERPL-MCNC: 15.83 NG/ML (ref 4.78–24.2)
GASTROCULT GAST QL: NEGATIVE
GFR SERPL CREATININE-BSD FRML MDRD: 97 ML/MIN/1.73
GLUCOSE BLD-MCNC: 124 MG/DL (ref 65–99)
HCT VFR BLD AUTO: 23.5 % (ref 35.6–45.5)
HCT VFR BLD AUTO: 25.9 % (ref 35.6–45.5)
HCT VFR BLD AUTO: 27.2 % (ref 35.6–45.5)
HGB BLD-MCNC: 7.8 G/DL (ref 11.9–15.5)
HGB BLD-MCNC: 8.3 G/DL (ref 11.9–15.5)
HGB BLD-MCNC: 8.9 G/DL (ref 11.9–15.5)
IRON 24H UR-MRATE: 15 MCG/DL (ref 37–145)
IRON SATN MFR SERPL: 8 % (ref 20–50)
MCH RBC QN AUTO: 31.1 PG (ref 26.9–32)
MCHC RBC AUTO-ENTMCNC: 33.2 G/DL (ref 32.4–36.3)
MCV RBC AUTO: 93.6 FL (ref 80.5–98.2)
OSMOLALITY SERPL: 273 MOSM/KG (ref 280–301)
OSMOLALITY UR: 479 MOSM/KG
PLATELET # BLD AUTO: 245 10*3/MM3 (ref 140–500)
PMV BLD AUTO: 9.6 FL (ref 6–12)
POTASSIUM BLD-SCNC: 3.1 MMOL/L (ref 3.5–5.2)
POTASSIUM BLD-SCNC: 5.3 MMOL/L (ref 3.5–5.2)
RBC # BLD AUTO: 2.51 10*6/MM3 (ref 3.9–5.2)
RETICS/RBC NFR AUTO: 2.33 % (ref 0.5–1.5)
SODIUM BLD-SCNC: 128 MMOL/L (ref 136–145)
SODIUM UR-SCNC: <20 MMOL/L
TIBC SERPL-MCNC: 185 MCG/DL (ref 298–536)
TRANSFERRIN SERPL-MCNC: 124 MG/DL (ref 200–360)
TSH SERPL DL<=0.05 MIU/L-ACNC: 3.23 MIU/ML (ref 0.27–4.2)
VIT B12 BLD-MCNC: 1334 PG/ML (ref 211–946)
WBC NRBC COR # BLD: 14.18 10*3/MM3 (ref 4.5–10.7)

## 2017-04-04 PROCEDURE — 85027 COMPLETE CBC AUTOMATED: CPT | Performed by: INTERNAL MEDICINE

## 2017-04-04 PROCEDURE — 85045 AUTOMATED RETICULOCYTE COUNT: CPT | Performed by: INTERNAL MEDICINE

## 2017-04-04 PROCEDURE — 99232 SBSQ HOSP IP/OBS MODERATE 35: CPT | Performed by: INTERNAL MEDICINE

## 2017-04-04 PROCEDURE — 84466 ASSAY OF TRANSFERRIN: CPT | Performed by: INTERNAL MEDICINE

## 2017-04-04 PROCEDURE — 84443 ASSAY THYROID STIM HORMONE: CPT | Performed by: INTERNAL MEDICINE

## 2017-04-04 PROCEDURE — 85014 HEMATOCRIT: CPT | Performed by: INTERNAL MEDICINE

## 2017-04-04 PROCEDURE — 82570 ASSAY OF URINE CREATININE: CPT | Performed by: INTERNAL MEDICINE

## 2017-04-04 PROCEDURE — 83540 ASSAY OF IRON: CPT | Performed by: INTERNAL MEDICINE

## 2017-04-04 PROCEDURE — 94799 UNLISTED PULMONARY SVC/PX: CPT

## 2017-04-04 PROCEDURE — 83935 ASSAY OF URINE OSMOLALITY: CPT | Performed by: INTERNAL MEDICINE

## 2017-04-04 PROCEDURE — 82270 OCCULT BLOOD FECES: CPT | Performed by: INTERNAL MEDICINE

## 2017-04-04 PROCEDURE — 84132 ASSAY OF SERUM POTASSIUM: CPT | Performed by: INTERNAL MEDICINE

## 2017-04-04 PROCEDURE — 82436 ASSAY OF URINE CHLORIDE: CPT | Performed by: INTERNAL MEDICINE

## 2017-04-04 PROCEDURE — 82746 ASSAY OF FOLIC ACID SERUM: CPT | Performed by: INTERNAL MEDICINE

## 2017-04-04 PROCEDURE — 84300 ASSAY OF URINE SODIUM: CPT | Performed by: INTERNAL MEDICINE

## 2017-04-04 PROCEDURE — 85018 HEMOGLOBIN: CPT | Performed by: INTERNAL MEDICINE

## 2017-04-04 PROCEDURE — 87493 C DIFF AMPLIFIED PROBE: CPT | Performed by: PHYSICIAN ASSISTANT

## 2017-04-04 PROCEDURE — 80048 BASIC METABOLIC PNL TOTAL CA: CPT | Performed by: INTERNAL MEDICINE

## 2017-04-04 PROCEDURE — 82607 VITAMIN B-12: CPT | Performed by: INTERNAL MEDICINE

## 2017-04-04 PROCEDURE — 83930 ASSAY OF BLOOD OSMOLALITY: CPT | Performed by: INTERNAL MEDICINE

## 2017-04-04 RX ORDER — POTASSIUM CHLORIDE 1.5 G/1.77G
40 POWDER, FOR SOLUTION ORAL AS NEEDED
Status: DISCONTINUED | OUTPATIENT
Start: 2017-04-04 | End: 2017-04-04 | Stop reason: CLARIF

## 2017-04-04 RX ORDER — POTASSIUM CHLORIDE 750 MG/1
40 CAPSULE, EXTENDED RELEASE ORAL AS NEEDED
Status: DISCONTINUED | OUTPATIENT
Start: 2017-04-04 | End: 2017-04-11 | Stop reason: HOSPADM

## 2017-04-04 RX ORDER — POTASSIUM CHLORIDE 7.45 MG/ML
10 INJECTION INTRAVENOUS
Status: DISCONTINUED | OUTPATIENT
Start: 2017-04-04 | End: 2017-04-11 | Stop reason: HOSPADM

## 2017-04-04 RX ORDER — FERROUS SULFATE 325(65) MG
325 TABLET ORAL
Status: DISCONTINUED | OUTPATIENT
Start: 2017-04-04 | End: 2017-04-11 | Stop reason: HOSPADM

## 2017-04-04 RX ADMIN — POTASSIUM CHLORIDE 40 MEQ: 750 CAPSULE, EXTENDED RELEASE ORAL at 08:03

## 2017-04-04 RX ADMIN — ATORVASTATIN CALCIUM 40 MG: 40 TABLET, FILM COATED ORAL at 08:03

## 2017-04-04 RX ADMIN — BUDESONIDE AND FORMOTEROL FUMARATE DIHYDRATE 2 PUFF: 160; 4.5 AEROSOL RESPIRATORY (INHALATION) at 22:09

## 2017-04-04 RX ADMIN — FAMOTIDINE 20 MG: 20 TABLET, FILM COATED ORAL at 08:03

## 2017-04-04 RX ADMIN — ACETYLCYSTEINE 4 ML: 200 SOLUTION ORAL; RESPIRATORY (INHALATION) at 11:47

## 2017-04-04 RX ADMIN — IPRATROPIUM BROMIDE AND ALBUTEROL SULFATE 3 ML: .5; 3 SOLUTION RESPIRATORY (INHALATION) at 22:08

## 2017-04-04 RX ADMIN — IPRATROPIUM BROMIDE AND ALBUTEROL SULFATE 3 ML: .5; 3 SOLUTION RESPIRATORY (INHALATION) at 11:47

## 2017-04-04 RX ADMIN — POTASSIUM CHLORIDE 40 MEQ: 750 CAPSULE, EXTENDED RELEASE ORAL at 17:37

## 2017-04-04 RX ADMIN — ACETYLCYSTEINE 4 ML: 200 SOLUTION ORAL; RESPIRATORY (INHALATION) at 07:30

## 2017-04-04 RX ADMIN — DABIGATRAN ETEXILATE MESYLATE 150 MG: 150 CAPSULE ORAL at 20:26

## 2017-04-04 RX ADMIN — IPRATROPIUM BROMIDE AND ALBUTEROL SULFATE 3 ML: .5; 3 SOLUTION RESPIRATORY (INHALATION) at 15:42

## 2017-04-04 RX ADMIN — DILTIAZEM HYDROCHLORIDE 360 MG: 180 CAPSULE, COATED, EXTENDED RELEASE ORAL at 08:03

## 2017-04-04 RX ADMIN — SODIUM CHLORIDE 75 ML/HR: 9 INJECTION, SOLUTION INTRAVENOUS at 20:26

## 2017-04-04 RX ADMIN — ACETYLCYSTEINE 4 ML: 200 SOLUTION ORAL; RESPIRATORY (INHALATION) at 15:42

## 2017-04-04 RX ADMIN — POTASSIUM CHLORIDE 40 MEQ: 750 CAPSULE, EXTENDED RELEASE ORAL at 12:26

## 2017-04-04 RX ADMIN — SODIUM CHLORIDE 75 ML/HR: 9 INJECTION, SOLUTION INTRAVENOUS at 07:27

## 2017-04-04 RX ADMIN — FIDAXOMICIN 200 MG: 200 TABLET, FILM COATED ORAL at 20:26

## 2017-04-04 RX ADMIN — BUDESONIDE AND FORMOTEROL FUMARATE DIHYDRATE 2 PUFF: 160; 4.5 AEROSOL RESPIRATORY (INHALATION) at 07:30

## 2017-04-04 RX ADMIN — DABIGATRAN ETEXILATE MESYLATE 150 MG: 150 CAPSULE ORAL at 08:03

## 2017-04-04 RX ADMIN — FERROUS SULFATE TAB 325 MG (65 MG ELEMENTAL FE) 325 MG: 325 (65 FE) TAB at 17:37

## 2017-04-04 RX ADMIN — ACETYLCYSTEINE 4 ML: 200 SOLUTION ORAL; RESPIRATORY (INHALATION) at 22:09

## 2017-04-04 RX ADMIN — IPRATROPIUM BROMIDE AND ALBUTEROL SULFATE 3 ML: .5; 3 SOLUTION RESPIRATORY (INHALATION) at 07:29

## 2017-04-04 RX ADMIN — FIDAXOMICIN 200 MG: 200 TABLET, FILM COATED ORAL at 08:03

## 2017-04-04 RX ADMIN — VITAMIN D, TAB 1000IU (100/BT) 1000 UNITS: 25 TAB at 08:03

## 2017-04-04 NOTE — PROGRESS NOTES
LOS: 2 days     Chief Complaint:  Follow-up recurrent C diff    Interval History:  No acute events. Fewer BMs during the day yesterday but 2 soft watery ones overnight. She reports worsening with sugary drink. She is tolerating Dificid. She has mild diffuse pain described as cramping. No further nausea or vomiting.    ROS: no rash, no CP, no SOA    Vital Signs  Temp:  [98.2 °F (36.8 °C)-99.2 °F (37.3 °C)] 99.2 °F (37.3 °C)  Heart Rate:  [] 100  Resp:  [16-18] 16  BP: ()/(37-55) 103/52    Physical Exam:  General: awake, alert  Head: Normocephalic  Eyes: no scleral icterus  ENT: MMM, OP clear, no thrush.   Neck: Supple  Cardiovascular: tachy irreg irreg, no murmurs, rubs, or gallops; no LE edema  Respiratory: normal work of breathing on ambient air  GI: Abdomen is soft, mild TTP, mildly distended, normal bowel sounds in all four quadrants  : no Long catheter present  Musculoskeletal: no joint abnormalities, normal musculature  Skin: No rashes, lesions, or embolic phenomenon  Neurological: motor strength 4/5 in all four extremities  Psychiatric: Normal mood and affect   Vasc: no cyanosis; PIV w/o erythema    Antibiotics:  •  fidaxomicin (DIFICID) tablet 200 mg, 200 mg, Oral, Q12H, Hayden Landry MD, 200 mg at 04/04/17 0803    LABS:  CBC, BMP, C diff, micro reviewed today  Lab Results   Component Value Date    WBC 14.18 (H) 04/04/2017    HGB 8.9 (L) 04/04/2017    HCT 27.2 (L) 04/04/2017    MCV 93.6 04/04/2017     04/04/2017     Lab Results   Component Value Date    GLUCOSE 124 (H) 04/04/2017    BUN 21 04/04/2017    CREATININE 0.59 04/04/2017    EGFRIFNONA 97 04/04/2017    BCR 35.6 (H) 04/04/2017    CO2 23.3 04/04/2017    CALCIUM 7.9 (L) 04/04/2017    ALBUMIN 3.10 (L) 04/02/2017    LABIL2 1.1 04/02/2017    AST 18 04/02/2017    ALT 15 04/02/2017     Microbiology:  BCx: NGTD  UCx: 100k E coli     Radiology (prior):  CT w/ pan-colitis    Assessment/Plan   1. Recurrent C  difficile  -continue Dificid 200 mg PO BID x 10 days  -Rx given to CCP for pricing  -I am going to place an outpatient consult to [facility] for stool transplant as the procedure is not currently offered here at [facility]      2. 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 Hyponatremia     4. Asymptomatic bacteriuria  -UA with WBCs; no urinary symptoms; no role for treatment     5. Chronic Afib     Thank you for allowing me to be involved in the care of this patient. Infectious diseases will sign off at this time with antibiotics plan in place but please call me at [phone] if any further question.

## 2017-04-04 NOTE — PLAN OF CARE
Problem: Patient Care Overview (Adult)  Goal: Plan of Care Review  Outcome: Ongoing (interventions implemented as appropriate)    04/04/17 1651   Coping/Psychosocial Response Interventions   Plan Of Care Reviewed With patient   Patient Care Overview   Progress improving   Outcome Evaluation   Outcome Summary/Follow up Plan Pt's blood pressure is still on the low side of normal. Need multiple urines and a stool for hemeoccult blood. Pt will follow up with [facility] after discharge to see about possible fecal transplant.         Problem: Fall Risk (Adult)  Goal: Absence of Falls  Outcome: Ongoing (interventions implemented as appropriate)    Problem: Infection, Risk/Actual (Adult)  Goal: Infection Prevention/Resolution  Outcome: Ongoing (interventions implemented as appropriate)

## 2017-04-04 NOTE — PLAN OF CARE
Problem: Patient Care Overview (Adult)  Goal: Plan of Care Review  Outcome: Ongoing (interventions implemented as appropriate)    04/03/17 0120   Coping/Psychosocial Response Interventions   Plan Of Care Reviewed With patient   Patient Care Overview   Progress improving   Outcome Evaluation   Outcome Summary/Follow up Plan still need stool sample pt is alert and able to get up to bsc pt has had 2 runny stools but has had urine mixed in placed hat in room to catch stool bx tx given will cont to monitor.         Problem: Fall Risk (Adult)  Goal: Absence of Falls  Outcome: Ongoing (interventions implemented as appropriate)    Problem: Infection, Risk/Actual (Adult)  Goal: Infection Prevention/Resolution  Outcome: Ongoing (interventions implemented as appropriate)

## 2017-04-04 NOTE — PROGRESS NOTES
"Patient Name: Agnieszka Rizzo  :1930  86 y.o.      Patient Care Team:  Matt Rose MD as PCP - General (Family Medicine)  Marcie Robbins MD as Consulting Physician (Cardiology)  Nilesh Danielle MD as Consulting Physician (Urology)    Interval History:   IVF    Subjective:  Following for a fib and diastolic CHF     Objective   Vital Signs  Temp:  [98.2 °F (36.8 °C)-99.5 °F (37.5 °C)] 99.2 °F (37.3 °C)  Heart Rate:  [] 100  Resp:  [16-18] 16  BP: ()/(37-55) 103/52    Intake/Output Summary (Last 24 hours) at 17 0826  Last data filed at 17 0727   Gross per 24 hour   Intake          1893.75 ml   Output                0 ml   Net          1893.75 ml     Flowsheet Rows         First Filed Value    Admission Height  64\" (162.6 cm) Documented at 2017 1650    Admission Weight  115 lb (52.2 kg) Documented at 2017 1650          Physical Exam:   General Appearance:    Alert, cooperative, in no acute distress   Lungs:     Clear to auscultation.  Normal respiratory effort and rate.      Heart:    Irreg, irreg     Chest Wall:    No abnormalities observed   Abdomen:     Soft, nontender, positive bowel sounds.     Extremities:   no cyanosis, clubbing or edema.  No marked joint deformities.  Adequate musculoskeletal strength.       Results Review:      Results from last 7 days  Lab Units 17  0538   SODIUM mmol/L 128*   POTASSIUM mmol/L 3.1*   CHLORIDE mmol/L 96*   TOTAL CO2 mmol/L 23.3   BUN mg/dL 21   CREATININE mg/dL 0.59   GLUCOSE mg/dL 124*   CALCIUM mg/dL 7.9*           Results from last 7 days  Lab Units 17  0538   WBC 10*3/mm3 14.18*   HEMOGLOBIN g/dL 7.8*   HEMATOCRIT % 23.5*   PLATELETS 10*3/mm3 245       Results from last 7 days  Lab Units 17  1746   INR  1.97*   APTT seconds 40.6*                     Medication Review:     acetylcysteine 4 mL Nebulization 4x Daily - RT   atorvastatin 40 mg Oral Daily   budesonide-formoterol 2 puff Inhalation BID - RT "   cholecalciferol 1,000 Units Oral Daily   dabigatran etexilate 150 mg Oral Q12H   diltiaZEM  mg Oral Q24H   famotidine 20 mg Oral Daily   fidaxomicin 200 mg Oral Q12H   ipratropium-albuterol 3 mL Nebulization 4x Daily - RT          sodium chloride 75 mL/hr Last Rate: 75 mL/hr (04/04/17 0730)       Assessment/Plan     1. Atrial fibrillation. She failed cardioversion in January 2017. She has a CHADS2-Vasc score of 5. She is anticoagulated with Pradaxa. She is generally rate controlled. She is not a candidate for beta blockers because of lung disease.  2. Chronic diastolic heart failure. Currently not volume overloaded.  3. Dyspnea on exertion. Multifactorial  4. Mitral valve prolapse with very mild mitral regurgitation.  5. Mild tricuspid regurgitation without pulmonary hypertension.  6. Nonobstructive coronary artery disease diagnosed by catheter in 2007. She has noted coronary artery calcification on CT scans. Nuclear stress 12/16 was normal.   7. Hypertension. Her blood pressure is controlled.  8. Hyperlipidemia. Zetia and atorvastatin  9. Hyponatremia. Stable.  10. C. difficile diarrhea. ID recs noted.   11. Anemia     - BP ok. Give dilt today. Do not hold  - HGB trending down. ? Dilution. On pradaxa. Watch closely  - replace K    Marcie Robbins MD, [facility]  04/04/17  8:26 AM

## 2017-04-04 NOTE — PROGRESS NOTES
" LOS: 2 days   Primary Care Physician: Matt Rose MD     Subjective  Still with diarrhea although improved with resting. Ambulating well without dizziness. No CP SOA NV.    Vital Signs  Body mass index is 19.4 kg/(m^2).  Temp:  [98.2 °F (36.8 °C)-99.2 °F (37.3 °C)] 99.2 °F (37.3 °C)  Heart Rate:  [] 90  Resp:  [16-18] 18  BP: ()/(45-55) 103/52      Objective:  General Appearance:  Comfortable and in no acute distress.    Vital signs: (most recent): Blood pressure 103/52, pulse 90, temperature 99.2 °F (37.3 °C), resp. rate 18, height 64\" (162.6 cm), weight 113 lb (51.3 kg), SpO2 95 %.  Vital signs are normal.    HEENT: Normal HEENT exam.    Lungs:  Normal respiratory rate and normal effort.  Breath sounds clear to auscultation.  No wheezes.    Heart: Normal rate.  Irregular rhythm.  No murmur.   Abdomen: Abdomen is soft and non-distended.  There is right lower quadrant and left lower quadrant tenderness. There is no rebound tenderness.  There is no guarding.     Extremities: Normal range of motion.    Pulses: Distal pulses are intact.    Neurological: Patient is alert and oriented to person, place and time.    Pupils:  Pupils are equal, round, and reactive to light.    Skin:  Warm and dry.                Results Review:    I reviewed the patient's new clinical results.  I reviewed the patient's other test results and agree with the interpretation      Results from last 7 days  Lab Units 04/04/17  0822 04/04/17  0538 04/03/17  0731   WBC 10*3/mm3  --  14.18* 14.36*   HEMOGLOBIN g/dL 8.9* 7.8* 8.6*   PLATELETS 10*3/mm3  --  245 235       Results from last 7 days  Lab Units 04/04/17  0538 04/03/17  0731   SODIUM mmol/L 128* 131*   POTASSIUM mmol/L 3.1* 3.8   CHLORIDE mmol/L 96* 95*   TOTAL CO2 mmol/L 23.3 26.3   BUN mg/dL 21 17   CREATININE mg/dL 0.59 0.69   CALCIUM mg/dL 7.9* 8.2*   GLUCOSE mg/dL 124* 99       Results from last 7 days  Lab Units 04/02/17  1746   INR  1.97*     Hemoglobin A1C:No " results found for: HGBA1C    Glucose Range:No results found for: POCGLU    Medication Review: Yes    Physical Therapy:    Assessment/Plan     Active Hospital Problems (** Indicates Principal Problem)    Diagnosis Date Noted   • **C. difficile colitis [A04.7] 03/11/2017   • Iron deficiency anemia [D50.9] 04/04/2017   • Hypokalemia [E87.6] 04/04/2017   • Asymptomatic bacteriuria [R82.71] 04/03/2017   • Pancolitis [K51.00] 04/02/2017   • DNR (do not resuscitate) [Z66] 03/12/2017   • Chronic diastolic CHF (congestive heart failure) [I50.32] 03/12/2017   • Sepsis [A41.9] 03/12/2017   • Hyponatremia [E87.1] 02/08/2017   • Chronic atrial fibrillation [I48.2] 12/01/2016      Resolved Hospital Problems    Diagnosis Date Noted Date Resolved   No resolved problems to display.       Assessment & Plan  -CDiff diarrhea: ID following. Dificid for CDiff treatment. Outpt [facility] referral for fecal transplant.  -Anemia: Iron deficient by labs. 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 No overt melena or BRBPR however continued diarrhea. Will check FOBT and monitor H&H.  -ASx Bacteriuria: No urinary symptoms and GNR on UCx. No need for antibiotics  -MAC with Bronchiectasis: Off antibiotics due to recurrent CDiff.   -Chronic AFib: Cardiology following. Diltiazem, Pradaxa.  -Hx of SIADH noted. Sodium decreased with IVF today, will monitor and check urine studies.  -Hypokalemia: diarrhea and IVF. Will replace and monitor. Will check Mag level.    Disposition: TBD    Piyush Simpson MD  04/04/17  3:02 PM

## 2017-04-05 ENCOUNTER — APPOINTMENT (OUTPATIENT)
Dept: GENERAL RADIOLOGY | Facility: HOSPITAL | Age: 82
End: 2017-04-05
Attending: INTERNAL MEDICINE

## 2017-04-05 LAB
ANION GAP SERPL CALCULATED.3IONS-SCNC: 8.4 MMOL/L
BUN BLD-MCNC: 12 MG/DL (ref 8–23)
BUN/CREAT SERPL: 22.2 (ref 7–25)
CALCIUM SPEC-SCNC: 8.4 MG/DL (ref 8.6–10.5)
CHLORIDE SERPL-SCNC: 100 MMOL/L (ref 98–107)
CO2 SERPL-SCNC: 21.6 MMOL/L (ref 22–29)
CREAT BLD-MCNC: 0.54 MG/DL (ref 0.57–1)
DEPRECATED RDW RBC AUTO: 57.8 FL (ref 37–54)
ERYTHROCYTE [DISTWIDTH] IN BLOOD BY AUTOMATED COUNT: 17.3 % (ref 11.7–13)
GFR SERPL CREATININE-BSD FRML MDRD: 107 ML/MIN/1.73
GLUCOSE BLD-MCNC: 92 MG/DL (ref 65–99)
HCT VFR BLD AUTO: 23.9 % (ref 35.6–45.5)
HCT VFR BLD AUTO: 25.5 % (ref 35.6–45.5)
HGB BLD-MCNC: 7.6 G/DL (ref 11.9–15.5)
HGB BLD-MCNC: 8.7 G/DL (ref 11.9–15.5)
MAGNESIUM SERPL-MCNC: 1.7 MG/DL (ref 1.6–2.4)
MCH RBC QN AUTO: 31 PG (ref 26.9–32)
MCHC RBC AUTO-ENTMCNC: 34.1 G/DL (ref 32.4–36.3)
MCV RBC AUTO: 90.7 FL (ref 80.5–98.2)
PLATELET # BLD AUTO: 265 10*3/MM3 (ref 140–500)
PMV BLD AUTO: 9.4 FL (ref 6–12)
POTASSIUM BLD-SCNC: 4.9 MMOL/L (ref 3.5–5.2)
RBC # BLD AUTO: 2.81 10*6/MM3 (ref 3.9–5.2)
SODIUM BLD-SCNC: 130 MMOL/L (ref 136–145)
WBC NRBC COR # BLD: 10.32 10*3/MM3 (ref 4.5–10.7)

## 2017-04-05 PROCEDURE — 94799 UNLISTED PULMONARY SVC/PX: CPT

## 2017-04-05 PROCEDURE — 83735 ASSAY OF MAGNESIUM: CPT | Performed by: INTERNAL MEDICINE

## 2017-04-05 PROCEDURE — 99232 SBSQ HOSP IP/OBS MODERATE 35: CPT | Performed by: INTERNAL MEDICINE

## 2017-04-05 PROCEDURE — 25010000002 FUROSEMIDE PER 20 MG: Performed by: INTERNAL MEDICINE

## 2017-04-05 PROCEDURE — 80048 BASIC METABOLIC PNL TOTAL CA: CPT | Performed by: INTERNAL MEDICINE

## 2017-04-05 PROCEDURE — 74022 RADEX COMPL AQT ABD SERIES: CPT

## 2017-04-05 PROCEDURE — 85027 COMPLETE CBC AUTOMATED: CPT | Performed by: INTERNAL MEDICINE

## 2017-04-05 RX ORDER — FUROSEMIDE 10 MG/ML
40 INJECTION INTRAMUSCULAR; INTRAVENOUS ONCE
Status: COMPLETED | OUTPATIENT
Start: 2017-04-05 | End: 2017-04-05

## 2017-04-05 RX ADMIN — SODIUM CHLORIDE 75 ML/HR: 9 INJECTION, SOLUTION INTRAVENOUS at 05:07

## 2017-04-05 RX ADMIN — DABIGATRAN ETEXILATE MESYLATE 150 MG: 150 CAPSULE ORAL at 09:04

## 2017-04-05 RX ADMIN — FERROUS SULFATE TAB 325 MG (65 MG ELEMENTAL FE) 325 MG: 325 (65 FE) TAB at 19:09

## 2017-04-05 RX ADMIN — FERROUS SULFATE TAB 325 MG (65 MG ELEMENTAL FE) 325 MG: 325 (65 FE) TAB at 12:25

## 2017-04-05 RX ADMIN — IPRATROPIUM BROMIDE AND ALBUTEROL SULFATE 3 ML: .5; 3 SOLUTION RESPIRATORY (INHALATION) at 19:18

## 2017-04-05 RX ADMIN — DABIGATRAN ETEXILATE MESYLATE 150 MG: 150 CAPSULE ORAL at 21:52

## 2017-04-05 RX ADMIN — IPRATROPIUM BROMIDE AND ALBUTEROL SULFATE 3 ML: .5; 3 SOLUTION RESPIRATORY (INHALATION) at 15:28

## 2017-04-05 RX ADMIN — BUDESONIDE AND FORMOTEROL FUMARATE DIHYDRATE 2 PUFF: 160; 4.5 AEROSOL RESPIRATORY (INHALATION) at 19:18

## 2017-04-05 RX ADMIN — ATORVASTATIN CALCIUM 40 MG: 40 TABLET, FILM COATED ORAL at 09:04

## 2017-04-05 RX ADMIN — FAMOTIDINE 20 MG: 20 TABLET, FILM COATED ORAL at 09:04

## 2017-04-05 RX ADMIN — VITAMIN D, TAB 1000IU (100/BT) 1000 UNITS: 25 TAB at 09:04

## 2017-04-05 RX ADMIN — BUDESONIDE AND FORMOTEROL FUMARATE DIHYDRATE 2 PUFF: 160; 4.5 AEROSOL RESPIRATORY (INHALATION) at 08:03

## 2017-04-05 RX ADMIN — ACETYLCYSTEINE 4 ML: 200 SOLUTION ORAL; RESPIRATORY (INHALATION) at 19:18

## 2017-04-05 RX ADMIN — IPRATROPIUM BROMIDE AND ALBUTEROL SULFATE 3 ML: .5; 3 SOLUTION RESPIRATORY (INHALATION) at 11:39

## 2017-04-05 RX ADMIN — ACETYLCYSTEINE 4 ML: 200 SOLUTION ORAL; RESPIRATORY (INHALATION) at 08:03

## 2017-04-05 RX ADMIN — FERROUS SULFATE TAB 325 MG (65 MG ELEMENTAL FE) 325 MG: 325 (65 FE) TAB at 09:04

## 2017-04-05 RX ADMIN — DILTIAZEM HYDROCHLORIDE 360 MG: 180 CAPSULE, COATED, EXTENDED RELEASE ORAL at 09:04

## 2017-04-05 RX ADMIN — FIDAXOMICIN 200 MG: 200 TABLET, FILM COATED ORAL at 21:52

## 2017-04-05 RX ADMIN — ACETYLCYSTEINE 4 ML: 200 SOLUTION ORAL; RESPIRATORY (INHALATION) at 11:39

## 2017-04-05 RX ADMIN — FIDAXOMICIN 200 MG: 200 TABLET, FILM COATED ORAL at 09:04

## 2017-04-05 RX ADMIN — FUROSEMIDE 40 MG: 10 INJECTION, SOLUTION INTRAMUSCULAR; INTRAVENOUS at 17:21

## 2017-04-05 RX ADMIN — IPRATROPIUM BROMIDE AND ALBUTEROL SULFATE 3 ML: .5; 3 SOLUTION RESPIRATORY (INHALATION) at 08:03

## 2017-04-05 RX ADMIN — ACETYLCYSTEINE 4 ML: 200 SOLUTION ORAL; RESPIRATORY (INHALATION) at 15:28

## 2017-04-05 NOTE — PLAN OF CARE
Problem: Patient Care Overview (Adult)  Goal: Plan of Care Review  Outcome: Ongoing (interventions implemented as appropriate)    04/05/17 0052   Coping/Psychosocial Response Interventions   Plan Of Care Reviewed With patient   Patient Care Overview   Progress improving   Outcome Evaluation   Outcome Summary/Follow up Plan Pt Blood Pressure has increased and pt feels better multiple test back awaiting MD's recomendation on how to proceed with treatment. Possible fecal transplant at [facility] will cont to monitor.         Problem: Fall Risk (Adult)  Goal: Absence of Falls  Outcome: Ongoing (interventions implemented as appropriate)    Problem: Infection, Risk/Actual (Adult)  Goal: Infection Prevention/Resolution  Outcome: Ongoing (interventions implemented as appropriate)

## 2017-04-05 NOTE — PLAN OF CARE
Problem: Patient Care Overview (Adult)  Goal: Plan of Care Review  Outcome: Ongoing (interventions implemented as appropriate)    04/05/17 1828   Coping/Psychosocial Response Interventions   Plan Of Care Reviewed With patient   Patient Care Overview   Progress improving   Outcome Evaluation   Outcome Summary/Follow up Plan Consult PT will eval/treat in am, xray abd/chest RT abd distention, Lasix IV, DC IVF, referral to [facility] for potential fecal transplant       Goal: Adult Individualization and Mutuality  Outcome: Ongoing (interventions implemented as appropriate)  Goal: Discharge Needs Assessment  Outcome: Ongoing (interventions implemented as appropriate)    Problem: Fall Risk (Adult)  Goal: Absence of Falls  Outcome: Ongoing (interventions implemented as appropriate)    Problem: Infection, Risk/Actual (Adult)  Goal: Infection Prevention/Resolution  Outcome: Ongoing (interventions implemented as appropriate)    Problem: Skin Integrity Impairment, Risk/Actual (Adult)  Goal: Identify Related Risk Factors and Signs and Symptoms  Outcome: Ongoing (interventions implemented as appropriate)  Goal: Skin Integrity/Wound Healing  Outcome: Ongoing (interventions implemented as appropriate)

## 2017-04-05 NOTE — PROGRESS NOTES
" LOS: 3 days   Primary Care Physician: Matt Rose MD     Subjective  Abd pain and distension today. Diarrhea this morning but no BM this afternoon. No NV CP SOA.    Vital Signs  Body mass index is 19.4 kg/(m^2).  Temp:  [98.1 °F (36.7 °C)-99.8 °F (37.7 °C)] 98.5 °F (36.9 °C)  Heart Rate:  [] 103  Resp:  [18] 18  BP: ()/(46-77) 110/60      Objective:  General Appearance:  Comfortable and in no acute distress.    Vital signs: (most recent): Blood pressure 110/60, pulse 103, temperature 98.5 °F (36.9 °C), temperature source Oral, resp. rate 18, height 64\" (162.6 cm), weight 113 lb (51.3 kg), SpO2 96 %.  No fever.    HEENT: Normal HEENT exam.    Lungs:  Normal respiratory rate and normal effort.  Breath sounds clear to auscultation.  No wheezes.    Heart: Tachycardia.  Irregular rhythm.  No murmur.   Abdomen: Abdomen is soft and distended.  Hypoactive bowel sounds.   There is right lower quadrant and left lower quadrant tenderness. There is no rebound tenderness.  There is no guarding.     Extremities: Normal range of motion.    Pulses: Distal pulses are intact.    Neurological: Patient is alert and oriented to person, place and time.    Pupils:  Pupils are equal, round, and reactive to light.    Skin:  Warm and dry.                Results Review:    I reviewed the patient's new clinical results.      Results from last 7 days  Lab Units 04/05/17  0806 04/04/17  2359  04/04/17  0538   WBC 10*3/mm3 10.32  --   --  14.18*   HEMOGLOBIN g/dL 8.7* 7.6*  < > 7.8*   PLATELETS 10*3/mm3 265  --   --  245   < > = values in this interval not displayed.    Results from last 7 days  Lab Units 04/05/17  0806 04/04/17  2151 04/04/17  0538   SODIUM mmol/L 130*  --  128*   POTASSIUM mmol/L 4.9 5.3* 3.1*   CHLORIDE mmol/L 100  --  96*   TOTAL CO2 mmol/L 21.6*  --  23.3   BUN mg/dL 12  --  21   CREATININE mg/dL 0.54*  --  0.59   CALCIUM mg/dL 8.4*  --  7.9*   GLUCOSE mg/dL 92  --  124*       Results from last 7 days  Lab " Units 04/02/17  1746   INR  1.97*     Hemoglobin A1C:No results found for: HGBA1C    Glucose Range:No results found for: POCGLU    Medication Review: Yes    Physical Therapy:    Assessment/Plan     Active Hospital Problems (** Indicates Principal Problem)    Diagnosis Date Noted   • **C. difficile colitis [A04.7] 03/11/2017   • Iron deficiency anemia [D50.9] 04/04/2017   • Hypokalemia [E87.6] 04/04/2017   • Asymptomatic bacteriuria [R82.71] 04/03/2017   • Pancolitis [K51.00] 04/02/2017   • DNR (do not resuscitate) [Z66] 03/12/2017   • Chronic diastolic CHF (congestive heart failure) [I50.32] 03/12/2017   • Sepsis [A41.9] 03/12/2017   • Hyponatremia [E87.1] 02/08/2017   • Chronic atrial fibrillation [I48.2] 12/01/2016      Resolved Hospital Problems    Diagnosis Date Noted Date Resolved   No resolved problems to display.       Assessment & Plan  -CDiff diarrhea: ID following. Dificid for CDiff treatment. Outpt [facility] referral for fecal transplant. Will check abdominal series with her increased distension and abd pain today.  -Anemia: Iron deficient by labs. 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 Hgb stable and FOBT negative. PO iron replacement.  -ASx Bacteriuria: No urinary symptoms and EColi on UCx. No need for antibiotics  -MAC with Bronchiectasis: Off antibiotics due to recurrent CDiff.   -Chronic AFib: Cardiology following. Diltiazem, Pradaxa.  -Hx of SIADH noted. Sodium stable. Urine with appropriately low sodium. GI losses etiology. Will stop fluids as tolerating PO and diarrhea improved.  -Hypokalemia resolved.  -PT consult  Disposition: TBD    Piyush Simpson MD  04/05/17  3:25 PM

## 2017-04-05 NOTE — PROGRESS NOTES
Continued Stay Note  [facility]     Patient Name: Agnieszka Rizzo  MRN: [id number]  Today's Date: 4/5/2017    Admit Date: 4/2/2017          Discharge Plan       04/05/17 1420    Case Management/Social Work Plan    Plan Return home    Patient/Family In Agreement With Plan yes    Additional Comments Rx. for Dificid was submitted 4/4 for [place].  Awaiting confirmation.  Plan remains for patient to return home at [place].  CCP will continue to follow.              Discharge Codes     None            Becky S. Humeni[place], RN

## 2017-04-05 NOTE — PROGRESS NOTES
"Patient Name: Agnieszka Rizzo  :1930  86 y.o.      Patient Care Team:  Matt Rose MD as PCP - General (Family Medicine)  Marcie Robbins MD as Consulting Physician (Cardiology)  Nilesh Danielle MD as Consulting Physician (Urology)    Interval History:   Potassium replaced.    Subjective:  Following for atrial fibrillation    Objective   Vital Signs  Temp:  [98.1 °F (36.7 °C)-99.8 °F (37.7 °C)] 98.5 °F (36.9 °C)  Heart Rate:  [] 103  Resp:  [18] 18  BP: ()/(46-77) 110/60    Intake/Output Summary (Last 24 hours) at 17 1512  Last data filed at 17 1330   Gross per 24 hour   Intake             2323 ml   Output                0 ml   Net             2323 ml     Flowsheet Rows         First Filed Value    Admission Height  64\" (162.6 cm) Documented at 2017 1650    Admission Weight  115 lb (52.2 kg) Documented at 2017 1650          Physical Exam:   General Appearance:    Alert, cooperative, in no acute distress   Lungs:     Clear to auscultation.  Normal respiratory effort and rate.      Heart:   Irregularly irregular rhythm    Chest Wall:    No abnormalities observed   Abdomen:     Soft, nontender, positive bowel sounds.     Extremities:   no cyanosis, clubbing or edema.  No marked joint deformities.  Adequate musculoskeletal strength.       Results Review:      Results from last 7 days  Lab Units 17  0806   SODIUM mmol/L 130*   POTASSIUM mmol/L 4.9   CHLORIDE mmol/L 100   TOTAL CO2 mmol/L 21.6*   BUN mg/dL 12   CREATININE mg/dL 0.54*   GLUCOSE mg/dL 92   CALCIUM mg/dL 8.4*           Results from last 7 days  Lab Units 17  0806   WBC 10*3/mm3 10.32   HEMOGLOBIN g/dL 8.7*   HEMATOCRIT % 25.5*   PLATELETS 10*3/mm3 265       Results from last 7 days  Lab Units 17  1746   INR  1.97*   APTT seconds 40.6*           Results from last 7 days  Lab Units 17  0806   MAGNESIUM mg/dL 1.7             Medication Review:     acetylcysteine 4 mL Nebulization 4x " Daily - RT   atorvastatin 40 mg Oral Daily   budesonide-formoterol 2 puff Inhalation BID - RT   cholecalciferol 1,000 Units Oral Daily   dabigatran etexilate 150 mg Oral Q12H   diltiaZEM  mg Oral Q24H   famotidine 20 mg Oral Daily   ferrous sulfate 325 mg Oral TID With Meals   fidaxomicin 200 mg Oral Q12H   ipratropium-albuterol 3 mL Nebulization 4x Daily - RT          sodium chloride 75 mL/hr Last Rate: 75 mL/hr (04/05/17 5333)       Assessment/Plan     1. Atrial fibrillation. She failed cardioversion in January 2017. She has a CHADS2-Vasc score of 5. She is anticoagulated with Pradaxa. She is generally rate controlled. She is not a candidate for beta blockers because of lung disease.  2. Chronic diastolic heart failure. Currently not volume overloaded.  3. Dyspnea on exertion. Multifactorial  4. Mitral valve prolapse with very mild mitral regurgitation.  5. Mild tricuspid regurgitation without pulmonary hypertension.  6. Nonobstructive coronary artery disease diagnosed by catheter in 2007. She has noted coronary artery calcification on CT scans. Nuclear stress 12/16 was normal.   7. Hypertension. Her blood pressure is controlled.  8. Hyperlipidemia. Zetia and atorvastatin  9. Hyponatremia. Stable.  10. C. difficile diarrhea. ID recs noted.   11. Anemia  12.  Bronchiectasis with manic and Aspergillus colonization      - BP ok. Give dilt today. Do not hold  - HGB trending down. ? Dilution. On pradaxa. Watch closely  - Patient complaining of abdominal distention.  I'm going to discontinue her IV fluids and give her a dose of IV Lasix today.  I will recheck her in the morning.    Marcie Robbins MD, [facility]  04/05/17  3:12 PM

## 2017-04-06 LAB
ANION GAP SERPL CALCULATED.3IONS-SCNC: 10.2 MMOL/L
BUN BLD-MCNC: 7 MG/DL (ref 8–23)
BUN/CREAT SERPL: 15.9 (ref 7–25)
CALCIUM SPEC-SCNC: 8.4 MG/DL (ref 8.6–10.5)
CHLORIDE SERPL-SCNC: 94 MMOL/L (ref 98–107)
CO2 SERPL-SCNC: 23.8 MMOL/L (ref 22–29)
CREAT BLD-MCNC: 0.44 MG/DL (ref 0.57–1)
DEPRECATED RDW RBC AUTO: 57.8 FL (ref 37–54)
ERYTHROCYTE [DISTWIDTH] IN BLOOD BY AUTOMATED COUNT: 16.9 % (ref 11.7–13)
GFR SERPL CREATININE-BSD FRML MDRD: 136 ML/MIN/1.73
GLUCOSE BLD-MCNC: 89 MG/DL (ref 65–99)
HCT VFR BLD AUTO: 25.8 % (ref 35.6–45.5)
HGB BLD-MCNC: 8.4 G/DL (ref 11.9–15.5)
MCH RBC QN AUTO: 30.3 PG (ref 26.9–32)
MCHC RBC AUTO-ENTMCNC: 32.6 G/DL (ref 32.4–36.3)
MCV RBC AUTO: 93.1 FL (ref 80.5–98.2)
PLATELET # BLD AUTO: 286 10*3/MM3 (ref 140–500)
PMV BLD AUTO: 9.8 FL (ref 6–12)
POTASSIUM BLD-SCNC: 4 MMOL/L (ref 3.5–5.2)
RBC # BLD AUTO: 2.77 10*6/MM3 (ref 3.9–5.2)
SODIUM BLD-SCNC: 128 MMOL/L (ref 136–145)
WBC NRBC COR # BLD: 13.22 10*3/MM3 (ref 4.5–10.7)

## 2017-04-06 PROCEDURE — 80048 BASIC METABOLIC PNL TOTAL CA: CPT | Performed by: INTERNAL MEDICINE

## 2017-04-06 PROCEDURE — 94799 UNLISTED PULMONARY SVC/PX: CPT

## 2017-04-06 PROCEDURE — 99232 SBSQ HOSP IP/OBS MODERATE 35: CPT | Performed by: INTERNAL MEDICINE

## 2017-04-06 PROCEDURE — 25010000002 FUROSEMIDE PER 20 MG: Performed by: INTERNAL MEDICINE

## 2017-04-06 PROCEDURE — 85027 COMPLETE CBC AUTOMATED: CPT | Performed by: INTERNAL MEDICINE

## 2017-04-06 PROCEDURE — 97162 PT EVAL MOD COMPLEX 30 MIN: CPT

## 2017-04-06 PROCEDURE — 97110 THERAPEUTIC EXERCISES: CPT

## 2017-04-06 RX ORDER — FUROSEMIDE 10 MG/ML
40 INJECTION INTRAMUSCULAR; INTRAVENOUS ONCE
Status: COMPLETED | OUTPATIENT
Start: 2017-04-06 | End: 2017-04-06

## 2017-04-06 RX ORDER — POTASSIUM CHLORIDE 750 MG/1
40 CAPSULE, EXTENDED RELEASE ORAL ONCE
Status: COMPLETED | OUTPATIENT
Start: 2017-04-06 | End: 2017-04-06

## 2017-04-06 RX ORDER — FUROSEMIDE 10 MG/ML
20 INJECTION INTRAMUSCULAR; INTRAVENOUS ONCE
Status: COMPLETED | OUTPATIENT
Start: 2017-04-06 | End: 2017-04-06

## 2017-04-06 RX ADMIN — HYDROCORTISONE 2.5% 1 APPLICATION: 25 CREAM TOPICAL at 16:52

## 2017-04-06 RX ADMIN — BUDESONIDE AND FORMOTEROL FUMARATE DIHYDRATE 2 PUFF: 160; 4.5 AEROSOL RESPIRATORY (INHALATION) at 18:49

## 2017-04-06 RX ADMIN — FERROUS SULFATE TAB 325 MG (65 MG ELEMENTAL FE) 325 MG: 325 (65 FE) TAB at 09:30

## 2017-04-06 RX ADMIN — ACETYLCYSTEINE 4 ML: 200 SOLUTION ORAL; RESPIRATORY (INHALATION) at 15:38

## 2017-04-06 RX ADMIN — IPRATROPIUM BROMIDE AND ALBUTEROL SULFATE 3 ML: .5; 3 SOLUTION RESPIRATORY (INHALATION) at 18:49

## 2017-04-06 RX ADMIN — ACETYLCYSTEINE 4 ML: 200 SOLUTION ORAL; RESPIRATORY (INHALATION) at 11:55

## 2017-04-06 RX ADMIN — FUROSEMIDE 20 MG: 10 INJECTION, SOLUTION INTRAMUSCULAR; INTRAVENOUS at 16:51

## 2017-04-06 RX ADMIN — IPRATROPIUM BROMIDE AND ALBUTEROL SULFATE 3 ML: .5; 3 SOLUTION RESPIRATORY (INHALATION) at 15:38

## 2017-04-06 RX ADMIN — ACETYLCYSTEINE 4 ML: 200 SOLUTION ORAL; RESPIRATORY (INHALATION) at 18:49

## 2017-04-06 RX ADMIN — FERROUS SULFATE TAB 325 MG (65 MG ELEMENTAL FE) 325 MG: 325 (65 FE) TAB at 12:59

## 2017-04-06 RX ADMIN — ATORVASTATIN CALCIUM 40 MG: 40 TABLET, FILM COATED ORAL at 09:30

## 2017-04-06 RX ADMIN — IPRATROPIUM BROMIDE AND ALBUTEROL SULFATE 3 ML: .5; 3 SOLUTION RESPIRATORY (INHALATION) at 11:55

## 2017-04-06 RX ADMIN — DABIGATRAN ETEXILATE MESYLATE 150 MG: 150 CAPSULE ORAL at 09:31

## 2017-04-06 RX ADMIN — VITAMIN D, TAB 1000IU (100/BT) 1000 UNITS: 25 TAB at 09:30

## 2017-04-06 RX ADMIN — FUROSEMIDE 40 MG: 10 INJECTION, SOLUTION INTRAMUSCULAR; INTRAVENOUS at 09:31

## 2017-04-06 RX ADMIN — DILTIAZEM HYDROCHLORIDE 360 MG: 180 CAPSULE, COATED, EXTENDED RELEASE ORAL at 09:30

## 2017-04-06 RX ADMIN — POTASSIUM CHLORIDE 40 MEQ: 750 CAPSULE, EXTENDED RELEASE ORAL at 09:34

## 2017-04-06 RX ADMIN — IPRATROPIUM BROMIDE AND ALBUTEROL SULFATE 3 ML: .5; 3 SOLUTION RESPIRATORY (INHALATION) at 07:41

## 2017-04-06 RX ADMIN — FERROUS SULFATE TAB 325 MG (65 MG ELEMENTAL FE) 325 MG: 325 (65 FE) TAB at 18:36

## 2017-04-06 RX ADMIN — BUDESONIDE AND FORMOTEROL FUMARATE DIHYDRATE 2 PUFF: 160; 4.5 AEROSOL RESPIRATORY (INHALATION) at 07:55

## 2017-04-06 RX ADMIN — DABIGATRAN ETEXILATE MESYLATE 150 MG: 150 CAPSULE ORAL at 20:32

## 2017-04-06 RX ADMIN — FAMOTIDINE 20 MG: 20 TABLET, FILM COATED ORAL at 08:24

## 2017-04-06 RX ADMIN — FIDAXOMICIN 200 MG: 200 TABLET, FILM COATED ORAL at 20:32

## 2017-04-06 RX ADMIN — FIDAXOMICIN 200 MG: 200 TABLET, FILM COATED ORAL at 09:31

## 2017-04-06 RX ADMIN — ACETYLCYSTEINE 4 ML: 200 SOLUTION ORAL; RESPIRATORY (INHALATION) at 07:41

## 2017-04-06 NOTE — PROGRESS NOTES
Acute Care - Physical Therapy Initial Evaluation  [facility]     Patient Name: Agnieszka Rizzo  : 1930  MRN: [id number]  Today's Date: 2017   Onset of Illness/Injury or Date of Surgery Date: 17            Admit Date: 2017     Visit Dx:    ICD-10-CM ICD-9-CM   1. Pancolitis K51.00 556.6   2. Leukocytosis, unspecified type D72.829 288.60   3. Generalized weakness R53.1 780.79     Patient Active Problem List   Diagnosis   • Pneumothorax of right lung after biopsy   • Pulmonary aspergillosis   • Benign essential hypertension   • Chronic coronary artery disease   • Hyperlipidemia   • Mitral valve insufficiency   • Ventricular premature beats   • Ventricular tachycardia   • Chronic atrial fibrillation   • Pneumonia   • Pneumonia with the fungal infection aspergillosis   • Acute respiratory failure with hypoxia   • Acid-fast bacteria present   • Hyponatremia   • C. difficile colitis   • DNR (do not resuscitate)   • Sepsis   • Chronic diastolic CHF (congestive heart failure)   • CHF (congestive heart failure)   • Pancolitis   • Asymptomatic bacteriuria   • Iron deficiency anemia   • Hypokalemia     Past Medical History:   Diagnosis Date   • Asthma    • Atrial fibrillation    • Atrial flutter    • Bronchiectasis    • C. difficile diarrhea 3/11/2017   • CAD (coronary artery disease)     nonobstructive   • Colitis    • Cough    • Cryoglobulinemia    • Diastolic heart failure    • Fall    • Hyperlipidemia    • Hypertension    • Hyponatremia    • Hypoxia    • Infectious viral hepatitis     AGE 13   • Leg swelling    • Lesion of lung    • MR (mitral regurgitation)     mild   • MVP (mitral valve prolapse)    • Permanent atrial fibrillation    • Pneumonia with the fungal infection aspergillosis 2017   • SOB (shortness of breath)    • UTI (urinary tract infection)    • Wheeze     mild     Past Surgical History:   Procedure Laterality Date   • BRONCHOSCOPY N/A 2016    Procedure: BRONCHOSCOPY WITH  FLUORO, BRUSHINGS, BAL, AND BIOPSIES;  Surgeon: Rogelio Tucker MD;  Location: [facility] ENDOSCOPY;  Service:    • CATARACT EXTRACTION EXTRACAPSULAR W/ INTRAOCULAR LENS IMPLANTATION     • COLONOSCOPY      2013   • D&C WITH SUCTION     • HYSTERECTOMY     • KNEE ARTHROSCOPY Left           PT ASSESSMENT (last 72 hours)      PT Evaluation       04/06/17 0954       Rehab Evaluation    Document Type evaluation  -     Subjective Information agree to therapy  -     Patient Effort, Rehab Treatment good  -     Symptoms Noted During/After Treatment none  -     General Information    Onset of Illness/Injury or Date of Surgery Date 04/02/17  -     General Observations sitting EOB, RN present, no acute distress noted at rest  -     Pertinent History Of Current Problem pt admitted with vomitting and diarrhea, found to have c-diff, h/o afib and CHF  -     Precautions/Limitations fall precautions   contact precaution  -     Prior Level of Function independent:;gait;transfer;bed mobility;ADL's  -     Equipment Currently Used at Home walker, rolling   only uses when feeling weak  -     Plans/Goals Discussed With patient  -     Barriers to Rehab medically complex  -     Clinical Impression    Patient/Family Goals Statement to return home  -     Criteria for Skilled Therapeutic Interventions Met treatment indicated  -     Impairments Found (describe specific impairments) gait, locomotion, and balance;muscle performance  -     Rehab Potential good, to achieve stated therapy goals  -     Pain Assessment    Pain Assessment No/denies pain  -     Cognitive Assessment/Intervention    Current Cognitive/Communication Assessment functional  -     Orientation Status oriented x 4  -CH     Follows Commands/Answers Questions 100% of the time  -     Personal Safety WNL/WFL  -     Personal Safety Interventions fall prevention program maintained;gait belt;nonskid shoes/slippers when out of bed  -     ROM (Range of  Motion)    General ROM no range of motion deficits identified  -     MMT (Manual Muscle Testing)    General MMT Assessment other (see comments)   some generalized weakness noted with functional mobility  -     Bed Mobility, Assessment/Treatment    Bed Mob, Supine to Sit, [place] not tested  -     Bed Mob, Sit to Supine, [place] not tested  -     Bed Mobility, Comment pt sitting EOB  -     Transfer Assessment/Treatment    Transfers, Sit-Stand [place] verbal cues required;nonverbal cues required (demo/gesture);contact guard assist;hand held assist  -CH     Transfers, Stand-Sit [place] verbal cues required;nonverbal cues required (demo/gesture);contact guard assist;hand held assist  -CH     Gait Assessment/Treatment    Gait, [place] Level verbal cues required;nonverbal cues required (demo/gesture);contact guard assist;hand held assist  -CH     Gait, Distance (Feet) 180  -     Gait, Gait Deviations cecilia decreased;step length decreased;stride length decreased  -     Gait, Safety Issues balance decreased during turns  -     Gait, Impairments strength decreased;impaired balance  -     Motor Skills/Interventions    Additional Documentation Balance Skills Training (Group)  -     Balance Skills Training    Standing-Level of Assistance Contact guard  -     Gait Balance-Level of Assistance Contact guard  -CH     Positioning and Restraints    Pre-Treatment Position in bed  -CH     Post Treatment Position bed  -CH     In Bed sitting EOB;call light within reach;encouraged to call for assist  -       User Key  (r) = Recorded By, (t) = Taken By, (c) = Cosigned By    Initials Name Provider Type    TREY Gray PT Physical Therapist          Physical Therapy Education     Title: PT OT SLP Therapies (Done)     Topic: Physical Therapy (Done)     Point: Mobility training (Done)    Learning Progress Summary    Learner Readiness Method Response Comment Documented by Status    Patient Acceptance E,TB,D NR,VU   04/06/17 1338 Done               Point: Body mechanics (Done)    Learning Progress Summary    Learner Readiness Method Response Comment Documented by Status   Patient Acceptance E,TB,D NR,VU   04/06/17 1338 Done               Point: Precautions (Done)    Learning Progress Summary    Learner Readiness Method Response Comment Documented by Status   Patient Acceptance E,TB,D NR,VU   04/06/17 1338 Done                      User Key     Initials Effective Dates Name Provider Type [facility] 12/01/15 -  Keren Gray, PT Physical Therapist PT                PT Recommendation and Plan  Anticipated Discharge Disposition: home with home health  Planned Therapy Interventions: balance training, bed mobility training, gait training, home exercise program, patient/family education, transfer training  PT Frequency: daily  Plan of Care Review  Plan Of Care Reviewed With: patient  Outcome Summary/Follow up Plan: Pt presents with some impaired functional mobility and gait secondary to generalized weakness and impaired balance with ambulation. Pt may benefit from skilled PT to address these deficits.          IP PT Goals       04/06/17 1338          Bed Mobility PT LTG    Bed Mobility PT LTG, Time to Achieve 1 wk  -CH      Bed Mobility PT LTG, Activity Type all bed mobility  -CH      Bed Mobility PT LTG, [place] Level independent  -CH      Transfer Training PT LTG    Transfer Training PT LTG, Time to Achieve 1 wk  -CH      Transfer Training PT LTG, Activity Type all transfers  -CH      Transfer Training PT LTG, [place] Level independent  -CH      Gait Training PT LTG    Gait Training Goal PT LTG, Time to Achieve 1 wk  -CH      Gait Training Goal PT LTG, [place] Level independent  -CH      Gait Training Goal PT LTG, Distance to Achieve 150  -CH        User Key  (r) = Recorded By, (t) = Taken By, (c) = Cosigned By    Initials Name Provider Type     Keren Gray,  PT Physical Therapist                Outcome Measures       04/06/17 1300          How much help from another person do you currently need...    Turning from your back to your side while in flat bed without using bedrails? 3  -CH      Moving from lying on back to sitting on the side of a flat bed without bedrails? 3  -CH      Moving to and from a bed to a chair (including a wheelchair)? 3  -CH      Standing up from a chair using your arms (e.g., wheelchair, bedside chair)? 3  -CH      Climbing 3-5 steps with a railing? 2  -CH      To walk in hospital room? 3  -CH      AM-PAC 6 Clicks Score 17  -CH      Functional Assessment    Outcome Measure Options AM-PAC 6 Clicks Basic Mobility (PT)  -CH        User Key  (r) = Recorded By, (t) = Taken By, (c) = Cosigned By    Initials Name Provider Type    TREY Gray PT Physical Therapist           Time Calculation:         PT Charges       04/06/17 1340          Time Calculation    Start Time 0937  -      Stop Time 0954  -      Time Calculation (min) 17 min  -      PT Received On 04/06/17  -      PT - Next Appointment 04/07/17  -      PT Goal Re-Cert Due Date 04/13/17  -        User Key  (r) = Recorded By, (t) = Taken By, (c) = Cosigned By    Initials Name Provider Type    TREY Gray PT Physical Therapist          Therapy Charges for Today     Code Description Service Date Service Provider Modifiers Qty    26090567001 HC PT EVAL MOD COMPLEXITY 2 4/6/2017 Keren Gray, PT GP 1    74220011767 HC PT THER PROC EA 15 MIN 4/6/2017 Keren Gray, PT GP 1          PT G-Codes  Outcome Measure Options: AM-PAC 6 Clicks Basic Mobility (PT)      Keren Gray PT  4/6/2017

## 2017-04-06 NOTE — PROGRESS NOTES
Continued Stay Note  [facility]     Patient Name: Agnieszka Rizzo  MRN: [id number]  Today's Date: 4/6/2017    Admit Date: 4/2/2017          Discharge Plan       04/06/17 0942    Case Management/Social Work Plan    Plan Return home    Patient/Family In Agreement With Plan yes    Additional Comments PA for Dificid has been approved.  Co-pay for 7 days of treatment will be $1000 - discussed with patient and she will discuss with GI/MD.  Plan remains for patient to return home at [place].  She does not feel that HH will be necessary.  she also asked about F/U for possible fecal transplant at [facility].  Spoke with Dr. Landry's office and they will contact her.  CCP will continue to follow as needed.              Discharge Codes     None            Becky S. [place], RN

## 2017-04-06 NOTE — PLAN OF CARE
Problem: Patient Care Overview (Adult)  Goal: Plan of Care Review  Outcome: Ongoing (interventions implemented as appropriate)    04/06/17 0145   Coping/Psychosocial Response Interventions   Plan Of Care Reviewed With patient   Patient Care Overview   Progress improving   Outcome Evaluation   Outcome Summary/Follow up Plan Possible D/C soon and consult to a gastro MD to [facility] for fecal transplant will cont to monitor         Problem: Fall Risk (Adult)  Goal: Absence of Falls  Outcome: Ongoing (interventions implemented as appropriate)    Problem: Infection, Risk/Actual (Adult)  Goal: Infection Prevention/Resolution  Outcome: Ongoing (interventions implemented as appropriate)    Problem: Skin Integrity Impairment, Risk/Actual (Adult)  Goal: Identify Related Risk Factors and Signs and Symptoms  Outcome: Ongoing (interventions implemented as appropriate)  Goal: Skin Integrity/Wound Healing  Outcome: Ongoing (interventions implemented as appropriate)

## 2017-04-06 NOTE — PLAN OF CARE
Problem: Patient Care Overview (Adult)  Goal: Plan of Care Review  Outcome: Ongoing (interventions implemented as appropriate)    04/06/17 1823   Coping/Psychosocial Response Interventions   Plan Of Care Reviewed With patient   Patient Care Overview   Progress improving   Outcome Evaluation   Outcome Summary/Follow up Plan VSS, pt denies pain, worked with PT, Lasix admin x 2, pt abdomen still distended CT 4/5 showed excess fluid & ileus, BM only x 1 today, plan DC home potentially tmrw       Goal: Adult Individualization and Mutuality  Outcome: Ongoing (interventions implemented as appropriate)  Goal: Discharge Needs Assessment  Outcome: Ongoing (interventions implemented as appropriate)    Problem: Fall Risk (Adult)  Goal: Absence of Falls  Outcome: Ongoing (interventions implemented as appropriate)    Problem: Infection, Risk/Actual (Adult)  Goal: Infection Prevention/Resolution  Outcome: Ongoing (interventions implemented as appropriate)    Problem: Skin Integrity Impairment, Risk/Actual (Adult)  Goal: Identify Related Risk Factors and Signs and Symptoms  Outcome: Ongoing (interventions implemented as appropriate)  Goal: Skin Integrity/Wound Healing  Outcome: Ongoing (interventions implemented as appropriate)

## 2017-04-06 NOTE — PROGRESS NOTES
"Patient Name: Agnieszka Rizzo  :1930  86 y.o.      Patient Care Team:  Matt Rose MD as PCP - General (Family Medicine)  Marcie Robbins MD as Consulting Physician (Cardiology)  Nilesh Danielle MD as Consulting Physician (Urology)    Interval History:   Stopped IV fluids yesterday and gave her dose of Lasix    Subjective:  Following for atrial fibrillation and diastolic heart failure    Objective   Vital Signs  Temp:  [98.1 °F (36.7 °C)-98.5 °F (36.9 °C)] 98.4 °F (36.9 °C)  Heart Rate:  [] 109  Resp:  [18-24] 24  BP: (110-134)/(7-74) 120/7    Intake/Output Summary (Last 24 hours) at 17 0824  Last data filed at 17 0657   Gross per 24 hour   Intake             1698 ml   Output             2120 ml   Net             -422 ml     Flowsheet Rows         First Filed Value    Admission Height  64\" (162.6 cm) Documented at 2017 1650    Admission Weight  115 lb (52.2 kg) Documented at 2017 1650          Physical Exam:   General Appearance:    Alert, cooperative, in no acute distress   Lungs:     Clear to auscultation.  Normal respiratory effort and rate.      Heart:    irreg irreg.     Chest Wall:    No abnormalities observed   Abdomen:     Mildly distended, nontender, positive bowel sounds.     Extremities:   RLE edema.       Results Review:      Results from last 7 days  Lab Units 17  0644   SODIUM mmol/L 128*   POTASSIUM mmol/L 4.0   CHLORIDE mmol/L 94*   TOTAL CO2 mmol/L 23.8   BUN mg/dL 7*   CREATININE mg/dL 0.44*   GLUCOSE mg/dL 89   CALCIUM mg/dL 8.4*           Results from last 7 days  Lab Units 17  0644   WBC 10*3/mm3 13.22*   HEMOGLOBIN g/dL 8.4*   HEMATOCRIT % 25.8*   PLATELETS 10*3/mm3 286       Results from last 7 days  Lab Units 17  1746   INR  1.97*   APTT seconds 40.6*           Results from last 7 days  Lab Units 17  0806   MAGNESIUM mg/dL 1.7             Medication Review:     acetylcysteine 4 mL Nebulization 4x Daily - RT   atorvastatin 40 " mg Oral Daily   budesonide-formoterol 2 puff Inhalation BID - RT   cholecalciferol 1,000 Units Oral Daily   dabigatran etexilate 150 mg Oral Q12H   diltiaZEM  mg Oral Q24H   famotidine 20 mg Oral Daily   ferrous sulfate 325 mg Oral TID With Meals   fidaxomicin 200 mg Oral Q12H   ipratropium-albuterol 3 mL Nebulization 4x Daily - RT             Assessment/Plan     1. Atrial fibrillation. She failed cardioversion in January 2017. She has a CHADS2-Vasc score of 5. She is anticoagulated with Pradaxa. She is generally rate controlled. She is not a candidate for beta blockers because of lung disease.  2. Chronic diastolic heart failure.   3. Dyspnea on exertion. Multifactorial  4. Mitral valve prolapse with very mild mitral regurgitation.  5. Mild tricuspid regurgitation without pulmonary hypertension.  6. Nonobstructive coronary artery disease diagnosed by catheter in 2007. She has noted coronary artery calcification on CT scans. Nuclear stress 12/16 was normal.   7. Hypertension. Her blood pressure is controlled.  8. Hyperlipidemia. Zetia and atorvastatin  9. Hyponatremia. Stable.  10. C. difficile diarrhea. ID recs noted.   11. Anemia  12. Bronchiectasis with manic and Aspergillus colonization      - Blood pressure has improved.  Continue diltiazem.  - Hemoglobin is stable but watch closely since she is on Pradaxa  - I think she is still volume overloaded.  I'm going to give her two more doses of Lasix today with potassium replacement.  I will recheck labs in the morning.  Hopefully she will be ready for discharge tomorrow.    Marcie Robbins MD, [facility]  04/06/17  8:24 AM

## 2017-04-06 NOTE — PROGRESS NOTES
" LOS: 4 days   Primary Care Physician: Matt Rose MD     Subjective  No CP SOA. Stomach still distended but having BM. Reporting hemorrhoid with some pain but no bleeding or itching.    Vital Signs  Body mass index is 19.4 kg/(m^2).  Temp:  [98.4 °F (36.9 °C)-98.7 °F (37.1 °C)] 98.7 °F (37.1 °C)  Heart Rate:  [] 110  Resp:  [18-28] 28  BP: (110-134)/(7-74) 128/72      Objective:  General Appearance:  Comfortable and in no acute distress.    Vital signs: (most recent): Blood pressure 128/72, pulse 110, temperature 98.7 °F (37.1 °C), temperature source Oral, resp. rate 28, height 64\" (162.6 cm), weight 113 lb (51.3 kg), SpO2 91 %.  No fever.    HEENT: Normal HEENT exam.    Lungs:  Normal respiratory rate and normal effort.  Breath sounds clear to auscultation.  No wheezes.    Heart: Tachycardia.  Irregular rhythm.  No murmur.   Abdomen: Abdomen is soft and distended.  Hypoactive bowel sounds.   There is right lower quadrant and left lower quadrant tenderness. There is no rebound tenderness.  There is no guarding.   (Hemorrhoid without clots, partially reducable.).   Extremities: Normal range of motion.    Pulses: Distal pulses are intact.    Neurological: Patient is alert and oriented to person, place and time.    Pupils:  Pupils are equal, round, and reactive to light.    Skin:  Warm and dry.                Results Review:    I reviewed the patient's new clinical results.  I reviewed the patient's new imaging results and agree with the interpretation.      Results from last 7 days  Lab Units 04/06/17  0644 04/05/17  0806   WBC 10*3/mm3 13.22* 10.32   HEMOGLOBIN g/dL 8.4* 8.7*   PLATELETS 10*3/mm3 286 265       Results from last 7 days  Lab Units 04/06/17  0644 04/05/17  0806   SODIUM mmol/L 128* 130*   POTASSIUM mmol/L 4.0 4.9   CHLORIDE mmol/L 94* 100   TOTAL CO2 mmol/L 23.8 21.6*   BUN mg/dL 7* 12   CREATININE mg/dL 0.44* 0.54*   CALCIUM mg/dL 8.4* 8.4*   GLUCOSE mg/dL 89 92       Results from last 7 " days  Lab Units 04/02/17  1746   INR  1.97*     Hemoglobin A1C:No results found for: HGBA1C    Glucose Range:No results found for: POCGLU    Medication Review: Yes    Physical Therapy:    Assessment/Plan     Active Hospital Problems (** Indicates Principal Problem)    Diagnosis Date Noted   • **C. difficile colitis [A04.7] 03/11/2017   • Iron deficiency anemia [D50.9] 04/04/2017   • Hypokalemia [E87.6] 04/04/2017   • Asymptomatic bacteriuria [R82.71] 04/03/2017   • Pancolitis [K51.00] 04/02/2017   • DNR (do not resuscitate) [Z66] 03/12/2017   • Chronic diastolic CHF (congestive heart failure) [I50.32] 03/12/2017   • Sepsis [A41.9] 03/12/2017   • Hyponatremia [E87.1] 02/08/2017   • Chronic atrial fibrillation [I48.2] 12/01/2016      Resolved Hospital Problems    Diagnosis Date Noted Date Resolved   No resolved problems to display.       Assessment & Plan    -CDiff diarrhea: ID following. Dificid for CDiff treatment. Outpt UL referral for fecal transplant. Abd series without toxic megacolon. WBC increasing  -Anemia: Iron deficient by labs. 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 Hgb stable and FOBT negative. PO iron replacement.  -ASx Bacteriuria: No urinary symptoms and EColi on UCx. No need for antibiotics  -MAC with Bronchiectasis: Off antibiotics due to recurrent CDiff.   -Chronic AFib: Cardiology following. Diltiazem, Pradaxa. Vascular congestion on CXR, to give diuretic today.  -Hx of SIADH noted. Sodium stable. Urine with appropriately low sodium. GI losses etiology. Will stop fluids as tolerating PO and diarrhea improved.  -Hypokalemia resolved.  -Homorrhoid on exam: will give topical cream.  -PT consult  Disposition: TBFOREST Simpson MD  04/06/17  12:29 PM

## 2017-04-06 NOTE — PLAN OF CARE
Problem: Patient Care Overview (Adult)  Goal: Plan of Care Review  Outcome: Ongoing (interventions implemented as appropriate)    04/06/17 1338   Coping/Psychosocial Response Interventions   Plan Of Care Reviewed With patient   Outcome Evaluation   Outcome Summary/Follow up Plan Pt presents with some impaired functional mobility and gait secondary to generalized weakness and impaired balance with ambulation. Pt may benefit from skilled PT to address these deficits.         Problem: Inpatient Physical Therapy  Goal: Bed Mobility Goal LTG- PT  Outcome: Ongoing (interventions implemented as appropriate)    04/06/17 1338   Bed Mobility PT LTG   Bed Mobility PT LTG, Time to Achieve 1 wk   Bed Mobility PT LTG, Activity Type all bed mobility   Bed Mobility PT LTG, [place] Level independent       Goal: Transfer Training Goal 1 LTG- PT  Outcome: Ongoing (interventions implemented as appropriate)    04/06/17 1338   Transfer Training PT LTG   Transfer Training PT LTG, Time to Achieve 1 wk   Transfer Training PT LTG, Activity Type all transfers   Transfer Training PT LTG, [place] Level independent       Goal: Gait Training Goal LTG- PT  Outcome: Ongoing (interventions implemented as appropriate)    04/06/17 1338   Gait Training PT LTG   Gait Training Goal PT LTG, Time to Achieve 1 wk   Gait Training Goal PT LTG, [place] Level independent   Gait Training Goal PT LTG, Distance to Achieve 150

## 2017-04-07 LAB
ANION GAP SERPL CALCULATED.3IONS-SCNC: 11 MMOL/L
ANION GAP SERPL CALCULATED.3IONS-SCNC: 14.8 MMOL/L
BACTERIA SPEC AEROBE CULT: NORMAL
BACTERIA SPEC AEROBE CULT: NORMAL
BASOPHILS # BLD AUTO: 0.04 10*3/MM3 (ref 0–0.2)
BASOPHILS NFR BLD AUTO: 0.3 % (ref 0–1.5)
BUN BLD-MCNC: 5 MG/DL (ref 8–23)
BUN BLD-MCNC: 5 MG/DL (ref 8–23)
BUN/CREAT SERPL: 11.6 (ref 7–25)
BUN/CREAT SERPL: 11.9 (ref 7–25)
BURR CELLS BLD QL SMEAR: NORMAL
CALCIUM SPEC-SCNC: 8.4 MG/DL (ref 8.6–10.5)
CALCIUM SPEC-SCNC: 8.5 MG/DL (ref 8.6–10.5)
CHLORIDE SERPL-SCNC: 89 MMOL/L (ref 98–107)
CHLORIDE SERPL-SCNC: 90 MMOL/L (ref 98–107)
CO2 SERPL-SCNC: 23.2 MMOL/L (ref 22–29)
CO2 SERPL-SCNC: 25 MMOL/L (ref 22–29)
CREAT BLD-MCNC: 0.42 MG/DL (ref 0.57–1)
CREAT BLD-MCNC: 0.43 MG/DL (ref 0.57–1)
DEPRECATED RDW RBC AUTO: 56.5 FL (ref 37–54)
EOSINOPHIL # BLD AUTO: 0.61 10*3/MM3 (ref 0–0.7)
EOSINOPHIL NFR BLD AUTO: 4.8 % (ref 0.3–6.2)
ERYTHROCYTE [DISTWIDTH] IN BLOOD BY AUTOMATED COUNT: 16.8 % (ref 11.7–13)
GFR SERPL CREATININE-BSD FRML MDRD: 139 ML/MIN/1.73
GFR SERPL CREATININE-BSD FRML MDRD: 143 ML/MIN/1.73
GLUCOSE BLD-MCNC: 144 MG/DL (ref 65–99)
GLUCOSE BLD-MCNC: 85 MG/DL (ref 65–99)
HCT VFR BLD AUTO: 26.4 % (ref 35.6–45.5)
HGB BLD-MCNC: 9.1 G/DL (ref 11.9–15.5)
IMM GRANULOCYTES # BLD: 0.23 10*3/MM3 (ref 0–0.03)
IMM GRANULOCYTES NFR BLD: 1.8 % (ref 0–0.5)
LYMPHOCYTES # BLD AUTO: 0.98 10*3/MM3 (ref 0.9–4.8)
LYMPHOCYTES NFR BLD AUTO: 7.7 % (ref 19.6–45.3)
MCH RBC QN AUTO: 31.7 PG (ref 26.9–32)
MCHC RBC AUTO-ENTMCNC: 34.5 G/DL (ref 32.4–36.3)
MCV RBC AUTO: 92 FL (ref 80.5–98.2)
MONOCYTES # BLD AUTO: 1.67 10*3/MM3 (ref 0.2–1.2)
MONOCYTES NFR BLD AUTO: 13.2 % (ref 5–12)
NEUTROPHILS # BLD AUTO: 9.12 10*3/MM3 (ref 1.9–8.1)
NEUTROPHILS NFR BLD AUTO: 72.2 % (ref 42.7–76)
NRBC BLD MANUAL-RTO: 0 /100 WBC (ref 0–0)
PLAT MORPH BLD: NORMAL
PLATELET # BLD AUTO: 341 10*3/MM3 (ref 140–500)
PMV BLD AUTO: 9.9 FL (ref 6–12)
POTASSIUM BLD-SCNC: 4 MMOL/L (ref 3.5–5.2)
POTASSIUM BLD-SCNC: 4.1 MMOL/L (ref 3.5–5.2)
RBC # BLD AUTO: 2.87 10*6/MM3 (ref 3.9–5.2)
RBC AGGLUT BLD QL: NORMAL
SODIUM BLD-SCNC: 126 MMOL/L (ref 136–145)
SODIUM BLD-SCNC: 127 MMOL/L (ref 136–145)
WBC MORPH BLD: NORMAL
WBC NRBC COR # BLD: 12.65 10*3/MM3 (ref 4.5–10.7)

## 2017-04-07 PROCEDURE — 85007 BL SMEAR W/DIFF WBC COUNT: CPT | Performed by: INTERNAL MEDICINE

## 2017-04-07 PROCEDURE — 94799 UNLISTED PULMONARY SVC/PX: CPT

## 2017-04-07 PROCEDURE — 99232 SBSQ HOSP IP/OBS MODERATE 35: CPT | Performed by: INTERNAL MEDICINE

## 2017-04-07 PROCEDURE — 80048 BASIC METABOLIC PNL TOTAL CA: CPT | Performed by: INTERNAL MEDICINE

## 2017-04-07 PROCEDURE — 97110 THERAPEUTIC EXERCISES: CPT

## 2017-04-07 PROCEDURE — 25010000002 ONDANSETRON PER 1 MG: Performed by: INTERNAL MEDICINE

## 2017-04-07 PROCEDURE — 85025 COMPLETE CBC W/AUTO DIFF WBC: CPT | Performed by: INTERNAL MEDICINE

## 2017-04-07 RX ORDER — FUROSEMIDE 40 MG/1
40 TABLET ORAL DAILY
Status: DISCONTINUED | OUTPATIENT
Start: 2017-04-07 | End: 2017-04-10

## 2017-04-07 RX ORDER — GUAIFENESIN 600 MG/1
1200 TABLET, EXTENDED RELEASE ORAL 2 TIMES DAILY
Status: DISCONTINUED | OUTPATIENT
Start: 2017-04-07 | End: 2017-04-11 | Stop reason: HOSPADM

## 2017-04-07 RX ADMIN — ACETYLCYSTEINE 4 ML: 200 SOLUTION ORAL; RESPIRATORY (INHALATION) at 11:48

## 2017-04-07 RX ADMIN — ACETYLCYSTEINE 4 ML: 200 SOLUTION ORAL; RESPIRATORY (INHALATION) at 16:17

## 2017-04-07 RX ADMIN — FERROUS SULFATE TAB 325 MG (65 MG ELEMENTAL FE) 325 MG: 325 (65 FE) TAB at 09:33

## 2017-04-07 RX ADMIN — DABIGATRAN ETEXILATE MESYLATE 150 MG: 150 CAPSULE ORAL at 09:33

## 2017-04-07 RX ADMIN — IPRATROPIUM BROMIDE AND ALBUTEROL SULFATE 3 ML: .5; 3 SOLUTION RESPIRATORY (INHALATION) at 20:34

## 2017-04-07 RX ADMIN — BUDESONIDE AND FORMOTEROL FUMARATE DIHYDRATE 2 PUFF: 160; 4.5 AEROSOL RESPIRATORY (INHALATION) at 20:34

## 2017-04-07 RX ADMIN — BUDESONIDE AND FORMOTEROL FUMARATE DIHYDRATE 2 PUFF: 160; 4.5 AEROSOL RESPIRATORY (INHALATION) at 08:24

## 2017-04-07 RX ADMIN — FERROUS SULFATE TAB 325 MG (65 MG ELEMENTAL FE) 325 MG: 325 (65 FE) TAB at 11:52

## 2017-04-07 RX ADMIN — DILTIAZEM HYDROCHLORIDE 360 MG: 180 CAPSULE, COATED, EXTENDED RELEASE ORAL at 09:32

## 2017-04-07 RX ADMIN — FUROSEMIDE 40 MG: 40 TABLET ORAL at 10:58

## 2017-04-07 RX ADMIN — IPRATROPIUM BROMIDE AND ALBUTEROL SULFATE 3 ML: .5; 3 SOLUTION RESPIRATORY (INHALATION) at 16:16

## 2017-04-07 RX ADMIN — FIDAXOMICIN 200 MG: 200 TABLET, FILM COATED ORAL at 09:32

## 2017-04-07 RX ADMIN — HYDROCORTISONE 2.5%: 25 CREAM TOPICAL at 09:34

## 2017-04-07 RX ADMIN — GUAIFENESIN 1200 MG: 600 TABLET, EXTENDED RELEASE ORAL at 18:45

## 2017-04-07 RX ADMIN — ONDANSETRON 4 MG: 2 INJECTION INTRAMUSCULAR; INTRAVENOUS at 18:49

## 2017-04-07 RX ADMIN — ACETYLCYSTEINE 4 ML: 200 SOLUTION ORAL; RESPIRATORY (INHALATION) at 08:07

## 2017-04-07 RX ADMIN — DABIGATRAN ETEXILATE MESYLATE 150 MG: 150 CAPSULE ORAL at 20:05

## 2017-04-07 RX ADMIN — FAMOTIDINE 20 MG: 20 TABLET, FILM COATED ORAL at 09:33

## 2017-04-07 RX ADMIN — FIDAXOMICIN 200 MG: 200 TABLET, FILM COATED ORAL at 20:05

## 2017-04-07 RX ADMIN — HYDROCORTISONE 2.5%: 25 CREAM TOPICAL at 18:00

## 2017-04-07 RX ADMIN — ATORVASTATIN CALCIUM 40 MG: 40 TABLET, FILM COATED ORAL at 09:33

## 2017-04-07 RX ADMIN — IPRATROPIUM BROMIDE AND ALBUTEROL SULFATE 3 ML: .5; 3 SOLUTION RESPIRATORY (INHALATION) at 08:07

## 2017-04-07 RX ADMIN — VITAMIN D, TAB 1000IU (100/BT) 1000 UNITS: 25 TAB at 09:33

## 2017-04-07 RX ADMIN — FERROUS SULFATE TAB 325 MG (65 MG ELEMENTAL FE) 325 MG: 325 (65 FE) TAB at 18:00

## 2017-04-07 RX ADMIN — ACETYLCYSTEINE 4 ML: 200 SOLUTION ORAL; RESPIRATORY (INHALATION) at 20:34

## 2017-04-07 RX ADMIN — IPRATROPIUM BROMIDE AND ALBUTEROL SULFATE 3 ML: .5; 3 SOLUTION RESPIRATORY (INHALATION) at 11:47

## 2017-04-07 NOTE — PLAN OF CARE
Problem: Patient Care Overview (Adult)  Goal: Plan of Care Review  Outcome: Ongoing (interventions implemented as appropriate)    04/07/17 2916   Coping/Psychosocial Response Interventions   Plan Of Care Reviewed With patient   Patient Care Overview   Progress improving   Outcome Evaluation   Outcome Summary/Follow up Plan Pt. continues with stools. Appear tarry and slightly formed no liquid stools noted. Lungs coarse and some peripheral edema noted.. Appetite is good . no complaints of pain. Possible discharge in 1-2 days. 04/07/17         Problem: Fall Risk (Adult)  Goal: Absence of Falls  Outcome: Ongoing (interventions implemented as appropriate)    Problem: Infection, Risk/Actual (Adult)  Goal: Infection Prevention/Resolution  Outcome: Ongoing (interventions implemented as appropriate)    Problem: Skin Integrity Impairment, Risk/Actual (Adult)  Goal: Skin Integrity/Wound Healing  Outcome: Ongoing (interventions implemented as appropriate)

## 2017-04-07 NOTE — PROGRESS NOTES
" LOS: 5 days   Primary Care Physician: Matt Rose MD     Subjective  Moderate cough is continuing.  She denies any chest pain.  Today she's had at least 5 episodes of diarrhea some of the was explosive.  She denies any abdominal pain.  Feels very weak    Vital Signs  Body mass index is 19.4 kg/(m^2).  Temp:  [97.3 °F (36.3 °C)-99.6 °F (37.6 °C)] 98.5 °F (36.9 °C)  Heart Rate:  [] 120  Resp:  [18-28] 20  BP: (120-127)/(58-73) 120/64      Objective:  Vital signs: (most recent): Blood pressure 120/64, pulse 120, temperature 98.5 °F (36.9 °C), temperature source Oral, resp. rate 20, height 64\" (162.6 cm), weight 113 lb (51.3 kg), SpO2 92 %.  No fever.    Lungs:  Normal respiratory rate and normal effort.  There are wheezes (diffuse bilaterally on expiration).  No rhonchi.    Heart: Tachycardia.  Irregular rhythm.  No murmur.   Abdomen: Abdomen is soft and distended.  Hyperactive bowel sounds.   There is no abdominal tenderness.  There is no rebound tenderness.  There is no guarding.   (Hemorrhoid without clots, partially reducable.).   Extremities: There is dependent edema (Bilateral firm brawny, no calf tenderness).    Neurological: Patient is alert.    Pupils:  Pupils are equal, round, and reactive to light.    Skin:  Warm and dry.              Results Review:    I reviewed the patient's new clinical results.  I reviewed the patient's new imaging results and agree with the interpretation.      Results from last 7 days  Lab Units 04/07/17  0707 04/06/17  0644   WBC 10*3/mm3 12.65* 13.22*   HEMOGLOBIN g/dL 9.1* 8.4*   PLATELETS 10*3/mm3 341 286       Results from last 7 days  Lab Units 04/07/17  0944 04/07/17  0707   SODIUM mmol/L 127* 126*   POTASSIUM mmol/L 4.0 4.1   CHLORIDE mmol/L 89* 90*   TOTAL CO2 mmol/L 23.2 25.0   BUN mg/dL 5* 5*   CREATININE mg/dL 0.43* 0.42*   CALCIUM mg/dL 8.5* 8.4*   GLUCOSE mg/dL 144* 85       Results from last 7 days  Lab Units 04/02/17  1746   INR  1.97*     Hemoglobin A1C:No " results found for: HGBA1C    Glucose Range:No results found for: POCGLU    Medication Review: Yes    Physical Therapy:    Assessment/Plan     Active Hospital Problems (** Indicates Principal Problem)    Diagnosis Date Noted   • **C. difficile colitis [A04.7] 03/11/2017   • Iron deficiency anemia [D50.9] 04/04/2017   • Hypokalemia [E87.6] 04/04/2017   • Asymptomatic bacteriuria [R82.71] 04/03/2017   • Pancolitis [K51.00] 04/02/2017   • DNR (do not resuscitate) [Z66] 03/12/2017   • Chronic diastolic CHF (congestive heart failure) [I50.32] 03/12/2017   • Sepsis [A41.9] 03/12/2017   • Hyponatremia [E87.1] 02/08/2017   • Chronic atrial fibrillation [I48.2] 12/01/2016      Resolved Hospital Problems    Diagnosis Date Noted Date Resolved   No resolved problems to display.       Assessment & Plan    -CDiff diarrhea: ID following. Dificid for CDiff treatment. Outpt UL referral for fecal transplant. Abd series without toxic megacolon. WBC about same as yesterday    -Anemia: Iron deficient by labs. 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 Hgb stable and FOBT negative. PO iron replacement.    -ASx Bacteriuria: No urinary symptoms and EColi on UCx. No need for antibiotics    -MAC with Bronchiectasis: Off antibiotics due to recurrent CDiff.     -Chronic AFib: Switch to oral diuretic because of hyponatremia, rate stable cardioversion unsuccessful in 1/17, on Pradaxa    COPD on DuoNeb diltiazem and Symbicort off oxygen will add Mucinex now and continue to continue treatment    -Hx of SIADH noted. Sodium stable. Urine with appropriately low sodium. GI losses etiology    -Hypokalemia resolved.      -PT consult  Disposition: TBD    Mimi Jack MD  04/07/17  11:23 AM

## 2017-04-07 NOTE — PROGRESS NOTES
Acute Care - Physical Therapy Treatment Note  [facility]     Patient Name: Agnieszka Rizzo  : 1930  MRN: [id number]  Today's Date: 2017  Onset of Illness/Injury or Date of Surgery Date: 17          Admit Date: 2017    Visit Dx:    ICD-10-CM ICD-9-CM   1. Pancolitis K51.00 556.6   2. Leukocytosis, unspecified type D72.829 288.60   3. Generalized weakness R53.1 780.79     Patient Active Problem List   Diagnosis   • Pneumothorax of right lung after biopsy   • Pulmonary aspergillosis   • Benign essential hypertension   • Chronic coronary artery disease   • Hyperlipidemia   • Mitral valve insufficiency   • Ventricular premature beats   • Ventricular tachycardia   • Chronic atrial fibrillation   • Pneumonia   • Pneumonia with the fungal infection aspergillosis   • Acute respiratory failure with hypoxia   • Acid-fast bacteria present   • Hyponatremia   • C. difficile colitis   • DNR (do not resuscitate)   • Sepsis   • Chronic diastolic CHF (congestive heart failure)   • CHF (congestive heart failure)   • Pancolitis   • Asymptomatic bacteriuria   • Iron deficiency anemia   • Hypokalemia               Adult Rehabilitation Note       17 [facility]          Rehab Assessment/Intervention    Discipline physical therapy assistant  -CW      Document Type therapy note (daily note)  -CW      Subjective Information agree to therapy;complains of;fatigue  -CW      Patient Effort, Rehab Treatment good  -CW      Precautions/Limitations fall precautions  -CW      Recorded by [CW] Colt Paniagua      Vital Signs    O2 Delivery Pre Treatment supplemental O2  -CW      Recorded by [CW] Colt Paniagua      Pain Assessment    Pain Assessment No/denies pain  -CW      Recorded by [CW] Colt Paniagua      Cognitive Assessment/Intervention    Current Cognitive/Communication Assessment functional  -CW      Orientation Status oriented x 4  -CW      Follows Commands/Answers Questions 100% of the time  -CW       Personal Safety WNL/WFL  -CW      Personal Safety Interventions fall prevention program maintained;gait belt;nonskid shoes/slippers when out of bed  -CW      Recorded by [CW] Colt Paniagua      Bed Mobility, Assessment/Treatment    Bed Mob, Supine to Sit, [place] supervision required  -CW      Bed Mob, Sit to Supine, [place] supervision required  -CW      Recorded by [CW] Colt Paniagua      Transfer Assessment/Treatment    Transfers, Sit-Stand [place] stand by assist  -CW      Transfers, Stand-Sit [place] stand by assist  -CW      Transfers, Sit-Stand-Sit, Assist Device rolling walker  -CW      Recorded by [CW] Colt Paniagua      Gait Assessment/Treatment    Gait, [place] Level contact guard assist  -CW      Gait, Distance (Feet) 300  -CW      Gait, Gait Deviations cecilia decreased;step length decreased;stride length decreased  -CW      Recorded by [JOSE CARLOS] Colt Paniagua      Positioning and Restraints    Pre-Treatment Position in bed  -CW      Post Treatment Position bed  -CW      In Bed notified nsg;sitting EOB;call light within reach;encouraged to call for assist  -CW      Recorded by [CW] Colt Paniagua        User Key  (r) = Recorded By, (t) = Taken By, (c) = Cosigned By    Initials Name Effective Dates    CW Colt Paniagua 12/13/16 -                 IP PT Goals       04/06/17 1338          Bed Mobility PT LTG    Bed Mobility PT LTG, Time to Achieve 1 wk  -CH      Bed Mobility PT LTG, Activity Type all bed mobility  -CH      Bed Mobility PT LTG, [place] Level independent  -CH      Transfer Training PT LTG    Transfer Training PT LTG, Time to Achieve 1 wk  -CH      Transfer Training PT LTG, Activity Type all transfers  -CH      Transfer Training PT LTG, [place] Level independent  -CH      Gait Training PT LTG    Gait Training Goal PT LTG, Time to Achieve 1 wk  -CH      Gait Training Goal PT LTG, [place] Level independent  -CH      Gait Training  Goal PT LTG, Distance to Achieve 150  -CH        User Key  (r) = Recorded By, (t) = Taken By, (c) = Cosigned By    Initials Name Provider Type     Keren Gray, PT Physical Therapist          Physical Therapy Education     Title: PT OT SLP Therapies (Done)     Topic: Physical Therapy (Done)     Point: Mobility training (Done)    Learning Progress Summary    Learner Readiness Method Response Comment Documented by Status   Patient Acceptance E,TB DU,VU   04/07/17 1139 Done    Acceptance E,TB,D NR,[facility] 04/06/17 1338 Done               Point: Body mechanics (Done)    Learning Progress Summary    Learner Readiness Method Response Comment Documented by Status   Patient Acceptance E,TB DU,VU   04/07/17 1139 Done    Acceptance E,TB,D NR,[facility] 04/06/17 1338 Done               Point: Precautions (Done)    Learning Progress Summary    Learner Readiness Method Response Comment Documented by Status   Patient Acceptance E,TB DU,VU   04/07/17 1139 Done    Acceptance E,TB,D NR,[facility] 04/06/17 1338 Done                      User Key     Initials Effective Dates Name Provider Type Discipline     12/01/15 -  Keren Gray, PT Physical Therapist PT     12/13/16 -  Colt Paniagua Physical Therapy Assistant PT                    PT Recommendation and Plan  Anticipated Discharge Disposition: home with home health  Planned Therapy Interventions: balance training, bed mobility training, gait training, home exercise program, patient/family education, transfer training  PT Frequency: daily  Plan of Care Review  Plan Of Care Reviewed With: patient  Progress: progress toward functional goals as expected  Outcome Summary/Follow up Plan: Pt increasing with amb safety and I           Outcome Measures       04/07/17 1100 04/06/17 1300       How much help from another person do you currently need...    Turning from your back to your side while in flat bed without using bedrails? 3  -CW 3  -CH     Moving from lying on back  to sitting on the side of a flat bed without bedrails? 3  -CW 3  -CH     Moving to and from a bed to a chair (including a wheelchair)? 3  -CW 3  -CH     Standing up from a chair using your arms (e.g., wheelchair, bedside chair)? 3  -CW 3  -CH     Climbing 3-5 steps with a railing? 2  -CW 2  -CH     To walk in hospital room? 3  -CW 3  -CH     AM-PAC 6 Clicks Score 17  -CW 17  -CH     Functional Assessment    Outcome Measure Options AM-PAC 6 Clicks Basic Mobility (PT)  -CW AM-PAC 6 Clicks Basic Mobility (PT)  -CH       User Key  (r) = Recorded By, (t) = Taken By, (c) = Cosigned By    Initials Name Provider Type    TREY Gray, PT Physical Therapist    CW Colt Paniagua Physical Therapy Assistant           Time Calculation:         PT Charges       04/07/17 1140          Time Calculation    Start Time 1113  -CW      Stop Time 1130  -CW      Time Calculation (min) 17 min  -CW      PT Received On 04/07/17  -CW      PT - Next Appointment 04/08/17  -CW        User Key  (r) = Recorded By, (t) = Taken By, (c) = Cosigned By    Initials Name Provider Type    CW Colt Paniagua Physical Therapy Assistant          Therapy Charges for Today     Code Description Service Date Service Provider Modifiers Qty    31286061400 HC PT THER PROC EA 15 MIN 4/7/2017 Colt Paniagua GP 1    39401699925 HC PT THER SUPP EA 15 MIN 4/7/2017 Colt Paniagua GP 1          PT G-Codes  Outcome Measure Options: AM-PAC 6 Clicks Basic Mobility (PT)    Colt Paniagua  4/7/2017

## 2017-04-07 NOTE — PLAN OF CARE
Problem: Patient Care Overview (Adult)  Goal: Plan of Care Review  Outcome: Ongoing (interventions implemented as appropriate)    04/06/17 1823 04/06/17 2033 04/06/17 2234   Coping/Psychosocial Response Interventions   Plan Of Care Reviewed With --  patient --    Patient Care Overview   Progress improving --  --    Outcome Evaluation   Outcome Summary/Follow up Plan --  --  afib, contact spore, and w/ cond, po antibx, monitor labs, plan d/c home for GI outpatient       Goal: Adult Individualization and Mutuality  Outcome: Ongoing (interventions implemented as appropriate)  Goal: Discharge Needs Assessment  Outcome: Ongoing (interventions implemented as appropriate)    Problem: Fall Risk (Adult)  Goal: Absence of Falls  Outcome: Ongoing (interventions implemented as appropriate)    Problem: Infection, Risk/Actual (Adult)  Goal: Infection Prevention/Resolution  Outcome: Ongoing (interventions implemented as appropriate)    Problem: Skin Integrity Impairment, Risk/Actual (Adult)  Goal: Identify Related Risk Factors and Signs and Symptoms  Outcome: Outcome(s) achieved Date Met:  04/06/17  Goal: Skin Integrity/Wound Healing  Outcome: Ongoing (interventions implemented as appropriate)

## 2017-04-07 NOTE — PROGRESS NOTES
"Patient Name: Agnieszka Rizzo  :1930  86 y.o.      Patient Care Team:  Matt Rose MD as PCP - General (Family Medicine)  Marcie Robbins MD as Consulting Physician (Cardiology)  Nilesh Danielle MD as Consulting Physician (Urology)    Interval History:   S/p IV lasix.  More diarrhea last night    Subjective:  Following for atrial fibrillation and diastolic heart failure    Objective   Vital Signs  Temp:  [97.3 °F (36.3 °C)-99.6 °F (37.6 °C)] 99.6 °F (37.6 °C)  Heart Rate:  [] 97  Resp:  [18-28] 20  BP: (121-127)/(58-73) 127/73    Intake/Output Summary (Last 24 hours) at 17 0843  Last data filed at 17 0034   Gross per 24 hour   Intake              720 ml   Output             2100 ml   Net            -1380 ml     Flowsheet Rows         First Filed Value    Admission Height  64\" (162.6 cm) Documented at 2017 1650    Admission Weight  115 lb (52.2 kg) Documented at 2017 1650          Physical Exam:   General Appearance:    Alert, cooperative, in no acute distress   Lungs:     Clear to auscultation.  Normal respiratory effort and rate.      Heart:    irregularly irregular with a normal rate.     Chest Wall:    No abnormalities observed   Abdomen:     Soft, nontender, positive bowel sounds.     Extremities:   no cyanosis, clubbing or edema.  No marked joint deformities.  Adequate musculoskeletal strength.       Results Review:      Results from last 7 days  Lab Units 17  0707   SODIUM mmol/L 126*   POTASSIUM mmol/L 4.1   CHLORIDE mmol/L 90*   TOTAL CO2 mmol/L 25.0   BUN mg/dL 5*   CREATININE mg/dL 0.42*   GLUCOSE mg/dL 85   CALCIUM mg/dL 8.4*           Results from last 7 days  Lab Units 17  0644   WBC 10*3/mm3 13.22*   HEMOGLOBIN g/dL 8.4*   HEMATOCRIT % 25.8*   PLATELETS 10*3/mm3 286       Results from last 7 days  Lab Units 17  1746   INR  1.97*   APTT seconds 40.6*           Results from last 7 days  Lab Units 17  0806   MAGNESIUM mg/dL 1.7           "   Medication Review:     acetylcysteine 4 mL Nebulization 4x Daily - RT   atorvastatin 40 mg Oral Daily   budesonide-formoterol 2 puff Inhalation BID - RT   cholecalciferol 1,000 Units Oral Daily   dabigatran etexilate 150 mg Oral Q12H   diltiaZEM  mg Oral Q24H   famotidine 20 mg Oral Daily   ferrous sulfate 325 mg Oral TID With Meals   fidaxomicin 200 mg Oral Q12H   hydrocortisone  Rectal BID   ipratropium-albuterol 3 mL Nebulization 4x Daily - RT             Assessment/Plan     1. Atrial fibrillation. She failed cardioversion in January 2017. She has a CHADS2-Vasc score of 5. She is anticoagulated with Pradaxa. She is generally rate controlled. She is not a candidate for beta blockers because of lung disease.  2. Chronic diastolic heart failure.   3. Dyspnea on exertion. Multifactorial  4. Mitral valve prolapse with very mild mitral regurgitation.  5. Mild tricuspid regurgitation without pulmonary hypertension.  6. Nonobstructive coronary artery disease diagnosed by catheter in 2007. She has noted coronary artery calcification on CT scans. Nuclear stress 12/16 was normal.   7. Hypertension. Her blood pressure is controlled.  8. Hyperlipidemia. Zetia and atorvastatin  9. Hyponatremia. Stable.  10. C. difficile diarrhea. ID recs noted.   11. Anemia  12. Bronchiectasis and Aspergillus colonization      - Blood pressure has improved. Continue diltiazem.  - Hemoglobin is stable but watch closely since she is on Pradaxa  - I don't think I can do more IV Lasix due to the hyponatremia.  I'm going to put her back on oral Lasix 40mg QD.   - I will see her on Sunday    Marcie Robbins MD, [facility]  04/07/17  8:43 AM

## 2017-04-07 NOTE — PLAN OF CARE
Problem: Patient Care Overview (Adult)  Goal: Plan of Care Review  Outcome: Ongoing (interventions implemented as appropriate)    04/07/17 1139   Coping/Psychosocial Response Interventions   Plan Of Care Reviewed With patient   Patient Care Overview   Progress progress toward functional goals as expected   Outcome Evaluation   Outcome Summary/Follow up Plan Pt increasing with amb safety and I

## 2017-04-08 LAB
ANION GAP SERPL CALCULATED.3IONS-SCNC: 7.6 MMOL/L
BUN BLD-MCNC: 6 MG/DL (ref 8–23)
BUN/CREAT SERPL: 12.8 (ref 7–25)
CALCIUM SPEC-SCNC: 8.5 MG/DL (ref 8.6–10.5)
CHLORIDE SERPL-SCNC: 91 MMOL/L (ref 98–107)
CO2 SERPL-SCNC: 30.4 MMOL/L (ref 22–29)
CREAT BLD-MCNC: 0.47 MG/DL (ref 0.57–1)
GFR SERPL CREATININE-BSD FRML MDRD: 126 ML/MIN/1.73
GLUCOSE BLD-MCNC: 98 MG/DL (ref 65–99)
POTASSIUM BLD-SCNC: 3.9 MMOL/L (ref 3.5–5.2)
SODIUM BLD-SCNC: 129 MMOL/L (ref 136–145)

## 2017-04-08 PROCEDURE — 94799 UNLISTED PULMONARY SVC/PX: CPT

## 2017-04-08 PROCEDURE — 80048 BASIC METABOLIC PNL TOTAL CA: CPT | Performed by: INTERNAL MEDICINE

## 2017-04-08 PROCEDURE — 97110 THERAPEUTIC EXERCISES: CPT

## 2017-04-08 RX ADMIN — IPRATROPIUM BROMIDE AND ALBUTEROL SULFATE 3 ML: .5; 3 SOLUTION RESPIRATORY (INHALATION) at 07:10

## 2017-04-08 RX ADMIN — FERROUS SULFATE TAB 325 MG (65 MG ELEMENTAL FE) 325 MG: 325 (65 FE) TAB at 18:27

## 2017-04-08 RX ADMIN — VITAMIN D, TAB 1000IU (100/BT) 1000 UNITS: 25 TAB at 08:42

## 2017-04-08 RX ADMIN — IPRATROPIUM BROMIDE AND ALBUTEROL SULFATE 3 ML: .5; 3 SOLUTION RESPIRATORY (INHALATION) at 11:11

## 2017-04-08 RX ADMIN — BUDESONIDE AND FORMOTEROL FUMARATE DIHYDRATE 2 PUFF: 160; 4.5 AEROSOL RESPIRATORY (INHALATION) at 21:33

## 2017-04-08 RX ADMIN — ACETYLCYSTEINE 4 ML: 200 SOLUTION ORAL; RESPIRATORY (INHALATION) at 11:11

## 2017-04-08 RX ADMIN — IPRATROPIUM BROMIDE AND ALBUTEROL SULFATE 3 ML: .5; 3 SOLUTION RESPIRATORY (INHALATION) at 15:24

## 2017-04-08 RX ADMIN — ACETYLCYSTEINE 4 ML: 200 SOLUTION ORAL; RESPIRATORY (INHALATION) at 21:33

## 2017-04-08 RX ADMIN — DILTIAZEM HYDROCHLORIDE 360 MG: 180 CAPSULE, COATED, EXTENDED RELEASE ORAL at 08:42

## 2017-04-08 RX ADMIN — FIDAXOMICIN 200 MG: 200 TABLET, FILM COATED ORAL at 08:42

## 2017-04-08 RX ADMIN — IPRATROPIUM BROMIDE AND ALBUTEROL SULFATE 3 ML: .5; 3 SOLUTION RESPIRATORY (INHALATION) at 21:32

## 2017-04-08 RX ADMIN — HYDROCORTISONE 2.5%: 25 CREAM TOPICAL at 18:27

## 2017-04-08 RX ADMIN — DABIGATRAN ETEXILATE MESYLATE 150 MG: 150 CAPSULE ORAL at 08:42

## 2017-04-08 RX ADMIN — GUAIFENESIN 1200 MG: 600 TABLET, EXTENDED RELEASE ORAL at 08:42

## 2017-04-08 RX ADMIN — ACETYLCYSTEINE 4 ML: 200 SOLUTION ORAL; RESPIRATORY (INHALATION) at 07:10

## 2017-04-08 RX ADMIN — FUROSEMIDE 40 MG: 40 TABLET ORAL at 08:42

## 2017-04-08 RX ADMIN — FERROUS SULFATE TAB 325 MG (65 MG ELEMENTAL FE) 325 MG: 325 (65 FE) TAB at 08:42

## 2017-04-08 RX ADMIN — DABIGATRAN ETEXILATE MESYLATE 150 MG: 150 CAPSULE ORAL at 20:44

## 2017-04-08 RX ADMIN — ACETYLCYSTEINE 4 ML: 200 SOLUTION ORAL; RESPIRATORY (INHALATION) at 15:25

## 2017-04-08 RX ADMIN — FIDAXOMICIN 200 MG: 200 TABLET, FILM COATED ORAL at 20:44

## 2017-04-08 RX ADMIN — HYDROCORTISONE 2.5%: 25 CREAM TOPICAL at 08:50

## 2017-04-08 RX ADMIN — ATORVASTATIN CALCIUM 40 MG: 40 TABLET, FILM COATED ORAL at 08:42

## 2017-04-08 RX ADMIN — FERROUS SULFATE TAB 325 MG (65 MG ELEMENTAL FE) 325 MG: 325 (65 FE) TAB at 12:52

## 2017-04-08 RX ADMIN — BUDESONIDE AND FORMOTEROL FUMARATE DIHYDRATE 2 PUFF: 160; 4.5 AEROSOL RESPIRATORY (INHALATION) at 07:11

## 2017-04-08 RX ADMIN — GUAIFENESIN 1200 MG: 600 TABLET, EXTENDED RELEASE ORAL at 18:27

## 2017-04-08 RX ADMIN — FAMOTIDINE 20 MG: 20 TABLET, FILM COATED ORAL at 08:41

## 2017-04-08 NOTE — PLAN OF CARE
Problem: Patient Care Overview (Adult)  Goal: Plan of Care Review  Outcome: Ongoing (interventions implemented as appropriate)    04/08/17 5321   Coping/Psychosocial Response Interventions   Plan Of Care Reviewed With patient   Patient Care Overview   Progress improving   Outcome Evaluation   Outcome Summary/Follow up Plan Pt doing well. Plan is to DC soon and for her to follow up outpatient with either [facility] or [facility] for possible fecal transplant. Vital signs stable.         Problem: Fall Risk (Adult)  Goal: Absence of Falls  Outcome: Ongoing (interventions implemented as appropriate)    Problem: Infection, Risk/Actual (Adult)  Goal: Infection Prevention/Resolution  Outcome: Ongoing (interventions implemented as appropriate)    Problem: Skin Integrity Impairment, Risk/Actual (Adult)  Goal: Skin Integrity/Wound Healing  Outcome: Ongoing (interventions implemented as appropriate)

## 2017-04-08 NOTE — PLAN OF CARE
Problem: Patient Care Overview (Adult)  Goal: Plan of Care Review  Outcome: Ongoing (interventions implemented as appropriate)    04/07/17 1456 04/07/17 2004 04/07/17 2214   Coping/Psychosocial Response Interventions   Plan Of Care Reviewed With --  patient --    Patient Care Overview   Progress improving --  --    Outcome Evaluation   Outcome Summary/Follow up Plan --  --  new onset cough and nausea, started mucinex and lasix X1, contact spore isol, AND w/ cond, monitor WBC and Na, plan d/c home for OP tx.        Goal: Adult Individualization and Mutuality  Outcome: Ongoing (interventions implemented as appropriate)  Goal: Discharge Needs Assessment  Outcome: Ongoing (interventions implemented as appropriate)    Problem: Fall Risk (Adult)  Goal: Absence of Falls  Outcome: Ongoing (interventions implemented as appropriate)    Problem: Infection, Risk/Actual (Adult)  Goal: Infection Prevention/Resolution  Outcome: Ongoing (interventions implemented as appropriate)    Problem: Skin Integrity Impairment, Risk/Actual (Adult)  Goal: Skin Integrity/Wound Healing  Outcome: Ongoing (interventions implemented as appropriate)

## 2017-04-08 NOTE — PROGRESS NOTES
" LOS: 6 days   Primary Care Physician: Matt Rose MD     Subjective  5-6 episodes pasty bowel movements in the last 24 hours.  There is some fecal incontinence associated with this.  Some crampy abdominal pain persist.  She is able to eat some.  She feels very bloated and is concerned about the leg edema that's developing.     Vital Signs  Body mass index is 19.4 kg/(m^2).  Temp:  [99.3 °F (37.4 °C)-99.5 °F (37.5 °C)] 99.3 °F (37.4 °C)  Heart Rate:  [] 91  Resp:  [18-20] 18  BP: (108-125)/(62-75) 125/75      Objective:  Vital signs: (most recent): Blood pressure 125/75, pulse 91, temperature 99.3 °F (37.4 °C), temperature source Oral, resp. rate 18, height 64\" (162.6 cm), weight 113 lb (51.3 kg), SpO2 94 %.  No fever.    Lungs:  Normal respiratory rate and normal effort.  No wheezes or rhonchi.  (Better expansion)  Heart: Tachycardia.  Irregular rhythm.  No murmur.   Abdomen: Abdomen is soft and distended.  Hyperactive bowel sounds.   There is no abdominal tenderness.  There is no rebound tenderness.  There is no guarding.   (Hemorrhoid without clots, partially reducable.).   Extremities: There is dependent edema (Bilateral firm brawny, no calf tenderness).    Neurological: Patient is alert.              Results Review:    I reviewed the patient's new clinical results.  I reviewed the patient's new imaging results and agree with the interpretation.      Results from last 7 days  Lab Units 04/07/17  0707 04/06/17  0644   WBC 10*3/mm3 12.65* 13.22*   HEMOGLOBIN g/dL 9.1* 8.4*   PLATELETS 10*3/mm3 341 286       Results from last 7 days  Lab Units 04/08/17  0742 04/07/17  0944   SODIUM mmol/L 129* 127*   POTASSIUM mmol/L 3.9 4.0   CHLORIDE mmol/L 91* 89*   TOTAL CO2 mmol/L 30.4* 23.2   BUN mg/dL 6* 5*   CREATININE mg/dL 0.47* 0.43*   CALCIUM mg/dL 8.5* 8.5*   GLUCOSE mg/dL 98 144*       Results from last 7 days  Lab Units 04/02/17  1746   INR  1.97*     Hemoglobin A1C:No results found for: HGBA1C    Glucose " Range:No results found for: POCGLU    Medication Review: Yes    Physical Therapy: Walked with therapy with contact-guard assistance 300 feet with rolling walker    Assessment/Plan     Active Hospital Problems (** Indicates Principal Problem)    Diagnosis Date Noted   • **C. difficile colitis [A04.7] 03/11/2017   • Iron deficiency anemia [D50.9] 04/04/2017   • Hypokalemia [E87.6] 04/04/2017   • Asymptomatic bacteriuria [R82.71] 04/03/2017   • Pancolitis [K51.00] 04/02/2017   • DNR (do not resuscitate) [Z66] 03/12/2017   • Chronic diastolic CHF (congestive heart failure) [I50.32] 03/12/2017   • Sepsis [A41.9] 03/12/2017   • Hyponatremia [E87.1] 02/08/2017   • Chronic atrial fibrillation [I48.2] 12/01/2016      Resolved Hospital Problems    Diagnosis Date Noted Date Resolved   No resolved problems to display.       Assessment & Plan    CDiff diarrhea: ID following. Dificid for CDiff treatment. Outpt [facility] referral for fecal transplant. Abd series without toxic megacolon.     -Anemia: Iron deficient by labs. 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 Hgb stable and FOBT negative. PO iron replacement.    -ASx Bacteriuria: No urinary symptoms and EColi on UCx. No need for antibiotics    -MAC with Bronchiectasis: Off antibiotics due to recurrent CDiff.     -Chronic AFib: Switch to oral diuretic because of hyponatremia, rate stable cardioversion unsuccessful in 1/17, on Pradaxa no increase in diuretic currently.      COPD on DuoNeb diltiazem and Symbicort off oxygen will add Mucinex now and continue to continue treatment    Hx of SIADH noted. Sodium stable. Urine with appropriately low sodium. GI losses etiology    Hypokalemia resolved.    Hyponatremia: Chronic issue values are improving.  Will stop daily labs    -PT consult  Disposition: HIPOLITO Jack MD  04/08/17  1:49 PM

## 2017-04-09 PROCEDURE — 99232 SBSQ HOSP IP/OBS MODERATE 35: CPT | Performed by: INTERNAL MEDICINE

## 2017-04-09 PROCEDURE — 94799 UNLISTED PULMONARY SVC/PX: CPT

## 2017-04-09 PROCEDURE — 97110 THERAPEUTIC EXERCISES: CPT | Performed by: PHYSICAL THERAPIST

## 2017-04-09 RX ADMIN — BUDESONIDE AND FORMOTEROL FUMARATE DIHYDRATE 2 PUFF: 160; 4.5 AEROSOL RESPIRATORY (INHALATION) at 09:39

## 2017-04-09 RX ADMIN — FIDAXOMICIN 200 MG: 200 TABLET, FILM COATED ORAL at 08:31

## 2017-04-09 RX ADMIN — IPRATROPIUM BROMIDE AND ALBUTEROL SULFATE 3 ML: .5; 3 SOLUTION RESPIRATORY (INHALATION) at 13:05

## 2017-04-09 RX ADMIN — HYDROCORTISONE 2.5%: 25 CREAM TOPICAL at 17:48

## 2017-04-09 RX ADMIN — GUAIFENESIN 1200 MG: 600 TABLET, EXTENDED RELEASE ORAL at 17:48

## 2017-04-09 RX ADMIN — FIDAXOMICIN 200 MG: 200 TABLET, FILM COATED ORAL at 21:10

## 2017-04-09 RX ADMIN — DILTIAZEM HYDROCHLORIDE 360 MG: 180 CAPSULE, COATED, EXTENDED RELEASE ORAL at 08:31

## 2017-04-09 RX ADMIN — IPRATROPIUM BROMIDE AND ALBUTEROL SULFATE 3 ML: .5; 3 SOLUTION RESPIRATORY (INHALATION) at 17:15

## 2017-04-09 RX ADMIN — ACETYLCYSTEINE 4 ML: 200 SOLUTION ORAL; RESPIRATORY (INHALATION) at 21:20

## 2017-04-09 RX ADMIN — ACETYLCYSTEINE 4 ML: 200 SOLUTION ORAL; RESPIRATORY (INHALATION) at 13:05

## 2017-04-09 RX ADMIN — BUDESONIDE AND FORMOTEROL FUMARATE DIHYDRATE 2 PUFF: 160; 4.5 AEROSOL RESPIRATORY (INHALATION) at 21:20

## 2017-04-09 RX ADMIN — HYDROCORTISONE 2.5%: 25 CREAM TOPICAL at 08:31

## 2017-04-09 RX ADMIN — GUAIFENESIN 1200 MG: 600 TABLET, EXTENDED RELEASE ORAL at 08:31

## 2017-04-09 RX ADMIN — IPRATROPIUM BROMIDE AND ALBUTEROL SULFATE 3 ML: .5; 3 SOLUTION RESPIRATORY (INHALATION) at 21:20

## 2017-04-09 RX ADMIN — FERROUS SULFATE TAB 325 MG (65 MG ELEMENTAL FE) 325 MG: 325 (65 FE) TAB at 17:48

## 2017-04-09 RX ADMIN — VITAMIN D, TAB 1000IU (100/BT) 1000 UNITS: 25 TAB at 08:31

## 2017-04-09 RX ADMIN — ATORVASTATIN CALCIUM 40 MG: 40 TABLET, FILM COATED ORAL at 08:31

## 2017-04-09 RX ADMIN — DABIGATRAN ETEXILATE MESYLATE 150 MG: 150 CAPSULE ORAL at 21:10

## 2017-04-09 RX ADMIN — ACETYLCYSTEINE 4 ML: 200 SOLUTION ORAL; RESPIRATORY (INHALATION) at 17:15

## 2017-04-09 RX ADMIN — DABIGATRAN ETEXILATE MESYLATE 150 MG: 150 CAPSULE ORAL at 08:31

## 2017-04-09 RX ADMIN — FAMOTIDINE 20 MG: 20 TABLET, FILM COATED ORAL at 08:31

## 2017-04-09 RX ADMIN — FERROUS SULFATE TAB 325 MG (65 MG ELEMENTAL FE) 325 MG: 325 (65 FE) TAB at 08:31

## 2017-04-09 RX ADMIN — FERROUS SULFATE TAB 325 MG (65 MG ELEMENTAL FE) 325 MG: 325 (65 FE) TAB at 12:42

## 2017-04-09 RX ADMIN — ACETYLCYSTEINE 4 ML: 200 SOLUTION ORAL; RESPIRATORY (INHALATION) at 09:38

## 2017-04-09 RX ADMIN — IPRATROPIUM BROMIDE AND ALBUTEROL SULFATE 3 ML: .5; 3 SOLUTION RESPIRATORY (INHALATION) at 09:38

## 2017-04-09 RX ADMIN — FUROSEMIDE 40 MG: 40 TABLET ORAL at 08:31

## 2017-04-09 NOTE — PROGRESS NOTES
"Patient Name: Agnieszka Rizzo  :1930  86 y.o.      Patient Care Team:  Matt Rose MD as PCP - General (Family Medicine)  Marcie Robbins MD as Consulting Physician (Cardiology)  Nilesh Danielle MD as Consulting Physician (Urology)    Interval History:   Tolerating oral Lasix.  Abdominal distention slowly decreasing    Subjective:  Following for atrial fibrillation and diastolic heart failure     Objective   Vital Signs  Temp:  [97.6 °F (36.4 °C)-99.3 °F (37.4 °C)] 97.6 °F (36.4 °C)  Heart Rate:  [] 98  Resp:  [16-22] 16  BP: (112-125)/(54-75) 115/71    Intake/Output Summary (Last 24 hours) at 17 0742  Last data filed at 17 0521   Gross per 24 hour   Intake              120 ml   Output             1600 ml   Net            -1480 ml     Flowsheet Rows         First Filed Value    Admission Height  64\" (162.6 cm) Documented at 2017 1650    Admission Weight  115 lb (52.2 kg) Documented at 2017 1650          Physical Exam:   General Appearance:    Alert, cooperative, in no acute distress   Lungs:     Clear to auscultation.  Normal respiratory effort and rate.      Heart:    irregularly irregular.  Mildly tachycardic     Chest Wall:    No abnormalities observed   Abdomen:     Soft, mildly tender, positive bowel sounds.     Extremities:   no cyanosis, clubbing or edema.  No marked joint deformities.  Adequate musculoskeletal strength.       Results Review:      Results from last 7 days  Lab Units 17  0742   SODIUM mmol/L 129*   POTASSIUM mmol/L 3.9   CHLORIDE mmol/L 91*   TOTAL CO2 mmol/L 30.4*   BUN mg/dL 6*   CREATININE mg/dL 0.47*   GLUCOSE mg/dL 98   CALCIUM mg/dL 8.5*           Results from last 7 days  Lab Units 17  0707   WBC 10*3/mm3 12.65*   HEMOGLOBIN g/dL 9.1*   HEMATOCRIT % 26.4*   PLATELETS 10*3/mm3 341       Results from last 7 days  Lab Units 17  1746   INR  1.97*   APTT seconds 40.6*           Results from last 7 days  Lab Units 17  0806 "   MAGNESIUM mg/dL 1.7             Medication Review:     acetylcysteine 4 mL Nebulization 4x Daily - RT   atorvastatin 40 mg Oral Daily   budesonide-formoterol 2 puff Inhalation BID - RT   cholecalciferol 1,000 Units Oral Daily   dabigatran etexilate 150 mg Oral Q12H   diltiaZEM  mg Oral Q24H   famotidine 20 mg Oral Daily   ferrous sulfate 325 mg Oral TID With Meals   fidaxomicin 200 mg Oral Q12H   furosemide 40 mg Oral Daily   guaiFENesin 1,200 mg Oral BID   hydrocortisone  Rectal BID   ipratropium-albuterol 3 mL Nebulization 4x Daily - RT             Assessment/Plan     1. Atrial fibrillation. She failed cardioversion in January 2017. She has a CHADS2-Vasc score of 5. She is anticoagulated with Pradaxa. She is generally rate controlled. She is not a candidate for beta blockers because of lung disease.  2. Chronic diastolic heart failure.   3. Dyspnea on exertion. Multifactorial  4. Mitral valve prolapse with very mild mitral regurgitation.  5. Mild tricuspid regurgitation without pulmonary hypertension.  6. Nonobstructive coronary artery disease diagnosed by catheter in 2007. She has noted coronary artery calcification on CT scans. Nuclear stress 12/16 was normal.   7. Hypertension. Her blood pressure is controlled.  8. Hyperlipidemia. Zetia and atorvastatin  9. Hyponatremia. Stable.  10. C. difficile diarrhea. ID recs noted.   11. Anemia  12. Bronchiectasis and Aspergillus colonization      - Continue with current medications.  - I will check on her tomorrow and see if she needs another dose of IV Lasix to help with her abdominal distention and lower extremity edema.  - Still having diarrhea as of this morning.    Marcie Robbins MD, [facility]  04/09/17  7:42 AM

## 2017-04-09 NOTE — PROGRESS NOTES
" LOS: 7 days   Primary Care Physician: Matt Rose MD     Subjective  3 episodes pasty black (iron) bowel movements in the last 24 hours.  There is some fecal incontinence associated with this.  abdominal pain is less  she still feels very bloated and thinks the leg edema is about the same.  Her weight is similar to what she had at home      Vital Signs  Body mass index is 21.25 kg/(m^2).  Temp:  [97.6 °F (36.4 °C)-98.9 °F (37.2 °C)] 98 °F (36.7 °C)  Heart Rate:  [] 90  Resp:  [16-22] 18  BP: (111-115)/(54-71) 111/66      Objective:  Vital signs: (most recent): Blood pressure 111/66, pulse 90, temperature 98 °F (36.7 °C), temperature source Oral, resp. rate 18, height 64\" (162.6 cm), weight 123 lb 12.8 oz (56.2 kg), SpO2 94 %.  No fever.    Lungs:  Normal respiratory rate and normal effort.  No wheezes or rhonchi.  (Better expansion)  Heart: Tachycardia.  Irregular rhythm.  No murmur.   Abdomen: Abdomen is soft and distended.  Hyperactive bowel sounds.   There is no abdominal tenderness.  There is no rebound tenderness.  There is no guarding.     Extremities: There is dependent edema (Bilateral firm brawny, no calf tenderness).    Neurological: Patient is alert.              Results Review:    I reviewed the patient's new clinical results.  I reviewed the patient's new imaging results and agree with the interpretation.      Results from last 7 days  Lab Units 04/07/17  0707 04/06/17  0644   WBC 10*3/mm3 12.65* 13.22*   HEMOGLOBIN g/dL 9.1* 8.4*   PLATELETS 10*3/mm3 341 286       Results from last 7 days  Lab Units 04/08/17  0742 04/07/17  0944   SODIUM mmol/L 129* 127*   POTASSIUM mmol/L 3.9 4.0   CHLORIDE mmol/L 91* 89*   TOTAL CO2 mmol/L 30.4* 23.2   BUN mg/dL 6* 5*   CREATININE mg/dL 0.47* 0.43*   CALCIUM mg/dL 8.5* 8.5*   GLUCOSE mg/dL 98 144*       Results from last 7 days  Lab Units 04/02/17  1746   INR  1.97*     Hemoglobin A1C:No results found for: HGBA1C    Glucose Range:No results found for: " POCGLU    Medication Review: Yes    Physical Therapy: Walked with therapy with contact-guard assistance 300 feet with rolling walker    Assessment/Plan     Active Hospital Problems (** Indicates Principal Problem)    Diagnosis Date Noted   • **C. difficile colitis [A04.7] 03/11/2017   • Iron deficiency anemia [D50.9] 04/04/2017   • Hypokalemia [E87.6] 04/04/2017   • Asymptomatic bacteriuria [R82.71] 04/03/2017   • Pancolitis [K51.00] 04/02/2017   • DNR (do not resuscitate) [Z66] 03/12/2017   • Chronic diastolic CHF (congestive heart failure) [I50.32] 03/12/2017   • Sepsis [A41.9] 03/12/2017   • Hyponatremia [E87.1] 02/08/2017   • Chronic atrial fibrillation [I48.2] 12/01/2016      Resolved Hospital Problems    Diagnosis Date Noted Date Resolved   No resolved problems to display.       Assessment & Plan    C Diff colitis: Continues to have diarrhea on Dificid  but lessening.  She is worried about going home and having symptoms recur as soon as she stops the Dificid similar to what she had previously.  She was told that the referral to [facility] may take as long as 10 days or 2 weeks.  We will need to discuss this with infectious disease.    Anemia: Iron deficient by labs. 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 Hgb stable and FOBT negative. PO iron replacement.    ASx Bacteriuria: No urinary symptoms and EColi on UCx. No need for antibiotics    MAC with Bronchiectasis: Off antibiotics due to recurrent CDiff.     Chronic AFib and chronic diastolic CHF: Switch to oral diuretic because of hyponatremia, rate stable cardioversion unsuccessful in 1/17, on Pradaxa no increase in diuretic currently.  Weight stable despite legs feeling swollen.     Mitral regurgitation mild, tricuspid regurgitation with secondary pulmonary hypertension:  COPD on DuoNeb diltiazem and Symbicort off oxygen will add Mucinex now and continue to continue treatment    Hx of SIADH noted. Sodium stable. Urine with  appropriately low sodium. GI losses etiology    Hypokalemia resolved.    Hyponatremia: Chronic issue values are improving.  Will stop daily labs    Disposition:    Mimi Jack MD  04/09/17  11:27 AM

## 2017-04-09 NOTE — PLAN OF CARE
Problem: Patient Care Overview (Adult)  Goal: Plan of Care Review  Outcome: Ongoing (interventions implemented as appropriate)    04/08/17 1742 04/08/17 2048 04/09/17 0721   Coping/Psychosocial Response Interventions   Plan Of Care Reviewed With --  patient --    Patient Care Overview   Progress improving --  --    Outcome Evaluation   Outcome Summary/Follow up Plan --  --  + urine ouput, < weight, c.s., and w/ cond, monitor Na, plan d/c for OP tx       Goal: Adult Individualization and Mutuality  Outcome: Ongoing (interventions implemented as appropriate)  Goal: Discharge Needs Assessment  Outcome: Ongoing (interventions implemented as appropriate)    Problem: Fall Risk (Adult)  Goal: Absence of Falls  Outcome: Ongoing (interventions implemented as appropriate)    Problem: Infection, Risk/Actual (Adult)  Goal: Infection Prevention/Resolution  Outcome: Ongoing (interventions implemented as appropriate)    Problem: Skin Integrity Impairment, Risk/Actual (Adult)  Goal: Skin Integrity/Wound Healing  Outcome: Ongoing (interventions implemented as appropriate)

## 2017-04-09 NOTE — PLAN OF CARE
Problem: Inpatient Physical Therapy  Goal: Bed Mobility Goal LTG- PT    04/09/17 0958   Bed Mobility PT LTG   Bed Mobility PT LTG, Time to Achieve 2 days   Bed Mobility PT LTG, Activity Type all bed mobility   Bed Mobility PT LTG, [place] Level conditional independence   Bed Mobility PT LTG, Outcome goal met       Goal: Transfer Training Goal 1 LTG- PT    04/09/17 0958   Transfer Training PT LTG   Transfer Training PT LTG, Time to Achieve 1 day   Transfer Training PT LTG, [place] Level conditional independence       Goal: Gait Training Goal LTG- PT    04/09/17 0958   Gait Training PT LTG   Gait Training Goal PT LTG, Time to Achieve 1 day   Gait Training Goal PT LTG, [place] Level contact guard assist   Gait Training Goal PT LTG, Assist Device walker, rolling   Gait Training Goal PT LTG, Distance to Achieve 400 ft with rwx   Gait Training Goal PT LTG, Outcome goal partially met

## 2017-04-09 NOTE — PROGRESS NOTES
Acute Care - Physical Therapy Treatment Note  [facility]     Patient Name: Agnieszka Rizzo  : 1930  MRN: [id number]  Today's Date: 2017  Onset of Illness/Injury or Date of Surgery Date: 17          Admit Date: 2017    Visit Dx:    ICD-10-CM ICD-9-CM   1. Pancolitis K51.00 556.6   2. Leukocytosis, unspecified type D72.829 288.60   3. Generalized weakness R53.1 780.79     Patient Active Problem List   Diagnosis   • Pneumothorax of right lung after biopsy   • Pulmonary aspergillosis   • Benign essential hypertension   • Chronic coronary artery disease   • Hyperlipidemia   • Mitral valve insufficiency   • Ventricular premature beats   • Ventricular tachycardia   • Chronic atrial fibrillation   • Pneumonia   • Pneumonia with the fungal infection aspergillosis   • Acute respiratory failure with hypoxia   • Acid-fast bacteria present   • Hyponatremia   • C. difficile colitis   • DNR (do not resuscitate)   • Sepsis   • Chronic diastolic CHF (congestive heart failure)   • CHF (congestive heart failure)   • Pancolitis   • Asymptomatic bacteriuria   • Iron deficiency anemia   • Hypokalemia               Adult Rehabilitation Note       17 0900 17 1000 17 1100    Rehab Assessment/Intervention    Discipline physical therapist  - physical therapy assistant  - physical therapy assistant  -    Document Type progress note  - therapy note (daily note)  - therapy note (daily note)  -    Subjective Information no complaints  - no complaints  - agree to therapy;complains of;fatigue  -CW    Patient Effort, Rehab Treatment good  - good  - good  -CW    Precautions/Limitations fall precautions  - fall precautions  - fall precautions  -CW    Recorded by [] Ivan Velez, PT [RH] Jay Rivers PTA [CW] Colt Paniagua    Vital Signs    O2 Delivery Pre Treatment  room air  - supplemental O2  -CW    Recorded by  [] Jay Rivers PTA [CW] Colt Paniagua     Pain Assessment    Pain Assessment No/denies pain  - No/denies pain  -RH No/denies pain  -CW    Recorded by [] Ivan Velez, PT [RH] Jay Rivers, PTA [] Colt Paniagua    Cognitive Assessment/Intervention    Current Cognitive/Communication Assessment functional  -DH functional  -RH functional  -CW    Orientation Status oriented x 4  -DH  oriented x 4  -CW    Follows Commands/Answers Questions 100% of the time  -  100% of the time  -CW    Personal Safety WNL/WFL  -DH  WNL/WFL  -CW    Personal Safety Interventions fall prevention program maintained  -  fall prevention program maintained;gait belt;nonskid shoes/slippers when out of bed  -CW    Recorded by [] Ivan Velez, PT [RH] Jay Rivers, PTA [] Colt Paniagua    ROM (Range of Motion)    General ROM  no range of motion deficits identified  -RH     Recorded by  [RH] Jay Rivers, PTA     Bed Mobility, Assessment/Treatment    Bed Mob, Supine to Sit, Roberts independent  - independent  - supervision required  -CW    Bed Mob, Sit to Supine, Roberts independent  - independent  - supervision required  -CW    Recorded by [] Ivan Velez, PT [RH] Jay Rivers, PTA [CW] Colt Paniagua    Transfer Assessment/Treatment    Transfers, Bed-Chair Roberts conditional independence  - conditional independence;stand by assist  -RH     Transfers, Chair-Bed Roberts conditional independence  - conditional independence;stand by assist  -RH     Transfers, Bed-Chair-Bed, Assist Device rolling walker  - standard walker  -RH     Transfers, Sit-Stand Roberts conditional independence  - conditional independence  -RH stand by assist  -CW    Transfers, Stand-Sit Roberts conditional independence  - conditional independence  -RH stand by assist  -CW    Transfers, Sit-Stand-Sit, Assist Device rolling walker  -DH rolling walker  -RH rolling walker  -CW    Recorded by [] Ivan Velez, PT  [] Jay Rivers PTA [CW] Colt Paniagua    Gait Assessment/Treatment    Gait, [place] Level stand by assist  - stand by assist  - contact guard assist  -    Gait, Assistive Device rolling walker  - --   no AD used  -     Gait, Distance (Feet) --   verbal cuing with hip flexion during swing through  - 300  -  -CW    Gait, Gait Deviations  cecilia decreased   intermittent foot drag  - cecilia decreased;step length decreased;stride length decreased  -    Gait, Safety Issues  sequencing ability decreased;step length decreased  -     Gait, Impairments  impaired balance  -RH     Recorded by [] Ivan Velez, PT [] Jay Rivers, EUGENIE [] Colt Paniagua    Functional Mobility    Functional Mobility- Ind. Level  conditional independence;supervision required  -RH     Recorded by  [] Jay Rivers PTA     Balance Skills Training    Sitting-Level of Assistance  Independent  -     Sitting-Balance Support  Feet supported  -     Standing-Level of Assistance  Independent;Distant supervision  -     Static Standing Balance Support  assistive device  -RH     Recorded by  [] Jay Rivers PTA     Positioning and Restraints    Pre-Treatment Position sitting in chair/recliner  - in bed  -RH in bed  -CW    Post Treatment Position chair  -DH bathroom  - bed  -CW    In Bed   notified nsg;sitting EOB;call light within reach;encouraged to call for assist  -CW    Bathroom sitting;call light within reach;encouraged to call for assist  - sitting;call light within reach  -RH     Recorded by [] Ivan Velez, PT [] Jay Rivers, EUGENIE [] Colt Paniagua      User Key  (r) = Recorded By, (t) = Taken By, (c) = Cosigned By    Initials Name Effective Dates     Jay Rivers PTA 02/18/16 -      Ivan Velez, PT 06/22/16 -     CW Colt Paniagua 12/13/16 -                 IP PT Goals       04/09/17 0958 04/06/17 1338       Bed Mobility PT LTG     Bed Mobility PT LTG, Time to Achieve 2 days  -DH 1 wk  -CH     Bed Mobility PT LTG, Activity Type all bed mobility  -DH all bed mobility  -CH     Bed Mobility PT LTG, [place] Level conditional independence  -DH independent  -CH     Bed Mobility PT LTG, Outcome goal met  -DH      Transfer Training PT LTG    Transfer Training PT LTG, Time to Achieve 1 day  -DH 1 wk  -CH     Transfer Training PT LTG, Activity Type  all transfers  -CH     Transfer Training PT LTG, [place] Level conditional independence  -DH independent  -CH     Gait Training PT LTG    Gait Training Goal PT LTG, Time to Achieve 1 day  -DH 1 wk  -CH     Gait Training Goal PT LTG, [place] Level contact guard assist  -DH independent  -CH     Gait Training Goal PT LTG, Assist Device walker, rolling  -DH      Gait Training Goal PT LTG, Distance to Achieve 400 ft with rwx  -  -CH     Gait Training Goal PT LTG, Outcome goal partially met  -DH        User Key  (r) = Recorded By, (t) = Taken By, (c) = Cosigned By    Initials Name Provider Type     Keren Gray, PT Physical Therapist     Ivan Velez, PT Physical Therapist          Physical Therapy Education     Title: PT OT SLP Therapies (Done)     Topic: Physical Therapy (Done)     Point: Mobility training (Done)    Learning Progress Summary    Learner Readiness Method Response Comment Documented by Status   Patient Eager E [facility] 04/09/17 0958 Done    Acceptance E,TB DU,VU  CW 04/07/17 1139 Done    Acceptance E,TB,D NR,VU   04/06/17 1338 Done               Point: Body mechanics (Done)    Learning Progress Summary    Learner Readiness Method Response Comment Documented by Status   Patient Eager E [facility] 04/09/17 0958 Done    Acceptance E,TB DU,VU  CW 04/07/17 1139 Done    Acceptance E,TB,D NR,[facility] 04/06/17 1338 Done               Point: Precautions (Done)    Learning Progress Summary    Learner Readiness Method Response Comment Documented by Status   Patient Eager E [facility]  04/09/17 0958 Done    Acceptance E,TB DU,VU   04/07/17 1139 Done    Acceptance E,TB,D NR,VU   04/06/17 1338 Done                      User Key     Initials Effective Dates Name Provider Type Discipline     12/01/15 -  Keren Gray, PT Physical Therapist PT     06/22/16 -  Ivan Velez, PT Physical Therapist PT     12/13/16 -  Colt Paniagua Physical Therapy Assistant PT                    PT Recommendation and Plan  Anticipated Discharge Disposition: home with home health  Planned Therapy Interventions: balance training, bed mobility training, gait training, home exercise program, patient/family education, transfer training  PT Frequency: daily             Outcome Measures       04/09/17 1000 04/08/17 1000 04/07/17 1100    How much help from another person do you currently need...    Turning from your back to your side while in flat bed without using bedrails? 4  - 4  -RH 3  -CW    Moving from lying on back to sitting on the side of a flat bed without bedrails? 4  - 4  -RH 3  -CW    Moving to and from a bed to a chair (including a wheelchair)? 4  - 4  -RH 3  -CW    Standing up from a chair using your arms (e.g., wheelchair, bedside chair)? 4  - 4  - 3  -CW    Climbing 3-5 steps with a railing? 3  - 3  -RH 2  -CW    To walk in hospital room? 3  - 3  -RH 3  -CW    AM-PAC 6 Clicks Score 22  - 22  - 17  -CW    Functional Assessment    Outcome Measure Options AM-PAC 6 Clicks Basic Mobility (PT)  -  AM-PAC 6 Clicks Basic Mobility (PT)  -      04/06/17 1300          How much help from another person do you currently need...    Turning from your back to your side while in flat bed without using bedrails? 3  -CH      Moving from lying on back to sitting on the side of a flat bed without bedrails? 3  -CH      Moving to and from a bed to a chair (including a wheelchair)? 3  -CH      Standing up from a chair using your arms (e.g., wheelchair, bedside chair)? 3  -CH      Climbing 3-5  steps with a railing? 2  -CH      To walk in hospital room? 3  -CH      AM-PAC 6 Clicks Score 17  -      Functional Assessment    Outcome Measure Options AM-PAC 6 Clicks Basic Mobility (PT)  -        User Key  (r) = Recorded By, (t) = Taken By, (c) = Cosigned By    Initials Name Provider Type     Keren Gray, PT Physical Therapist    RH Jay Rivers, PTA Physical Therapy Assistant     Ivan Velez, PT Physical Therapist    CW Colt Paniagua Physical Therapy Assistant           Time Calculation:         PT Charges       04/09/17 1001 04/09/17 1000       Time Calculation    Start Time  0948  -     Stop Time  1002  -     Time Calculation (min)  14 min  -     PT Received On 04/09/17  -      PT - Next Appointment 04/10/17  -        User Key  (r) = Recorded By, (t) = Taken By, (c) = Cosigned By    Initials Name Provider Type     Ivan Velez, PT Physical Therapist          Therapy Charges for Today     Code Description Service Date Service Provider Modifiers Qty    [id number]  PT THER PROC EA 15 MIN 4/9/2017 Ivan Velez, PT GP 1          PT G-Codes  Outcome Measure Options: AM-PAC 6 Clicks Basic Mobility (PT)    Ivan Velez, PT  4/9/2017

## 2017-04-10 PROCEDURE — 94799 UNLISTED PULMONARY SVC/PX: CPT

## 2017-04-10 PROCEDURE — 97110 THERAPEUTIC EXERCISES: CPT

## 2017-04-10 PROCEDURE — 25010000002 FUROSEMIDE PER 20 MG: Performed by: INTERNAL MEDICINE

## 2017-04-10 PROCEDURE — 99232 SBSQ HOSP IP/OBS MODERATE 35: CPT | Performed by: INTERNAL MEDICINE

## 2017-04-10 RX ORDER — TORSEMIDE 20 MG/1
40 TABLET ORAL DAILY
Status: DISCONTINUED | OUTPATIENT
Start: 2017-04-10 | End: 2017-04-11 | Stop reason: HOSPADM

## 2017-04-10 RX ORDER — FUROSEMIDE 10 MG/ML
40 INJECTION INTRAMUSCULAR; INTRAVENOUS ONCE
Status: COMPLETED | OUTPATIENT
Start: 2017-04-10 | End: 2017-04-10

## 2017-04-10 RX ADMIN — IPRATROPIUM BROMIDE AND ALBUTEROL SULFATE 3 ML: .5; 3 SOLUTION RESPIRATORY (INHALATION) at 16:19

## 2017-04-10 RX ADMIN — DABIGATRAN ETEXILATE MESYLATE 150 MG: 150 CAPSULE ORAL at 21:18

## 2017-04-10 RX ADMIN — FAMOTIDINE 20 MG: 20 TABLET, FILM COATED ORAL at 07:45

## 2017-04-10 RX ADMIN — DILTIAZEM HYDROCHLORIDE 360 MG: 180 CAPSULE, COATED, EXTENDED RELEASE ORAL at 08:39

## 2017-04-10 RX ADMIN — VITAMIN D, TAB 1000IU (100/BT) 1000 UNITS: 25 TAB at 08:39

## 2017-04-10 RX ADMIN — DABIGATRAN ETEXILATE MESYLATE 150 MG: 150 CAPSULE ORAL at 08:39

## 2017-04-10 RX ADMIN — ACETYLCYSTEINE 4 ML: 200 SOLUTION ORAL; RESPIRATORY (INHALATION) at 16:19

## 2017-04-10 RX ADMIN — ACETYLCYSTEINE 4 ML: 200 SOLUTION ORAL; RESPIRATORY (INHALATION) at 12:21

## 2017-04-10 RX ADMIN — IPRATROPIUM BROMIDE AND ALBUTEROL SULFATE 3 ML: .5; 3 SOLUTION RESPIRATORY (INHALATION) at 12:21

## 2017-04-10 RX ADMIN — ATORVASTATIN CALCIUM 40 MG: 40 TABLET, FILM COATED ORAL at 08:39

## 2017-04-10 RX ADMIN — FERROUS SULFATE TAB 325 MG (65 MG ELEMENTAL FE) 325 MG: 325 (65 FE) TAB at 17:01

## 2017-04-10 RX ADMIN — BUDESONIDE AND FORMOTEROL FUMARATE DIHYDRATE 2 PUFF: 160; 4.5 AEROSOL RESPIRATORY (INHALATION) at 09:11

## 2017-04-10 RX ADMIN — FIDAXOMICIN 200 MG: 200 TABLET, FILM COATED ORAL at 08:39

## 2017-04-10 RX ADMIN — HYDROCORTISONE 2.5%: 25 CREAM TOPICAL at 08:42

## 2017-04-10 RX ADMIN — FUROSEMIDE 40 MG: 10 INJECTION, SOLUTION INTRAMUSCULAR; INTRAVENOUS at 11:49

## 2017-04-10 RX ADMIN — IPRATROPIUM BROMIDE AND ALBUTEROL SULFATE 3 ML: .5; 3 SOLUTION RESPIRATORY (INHALATION) at 09:11

## 2017-04-10 RX ADMIN — TORSEMIDE 40 MG: 20 TABLET ORAL at 14:12

## 2017-04-10 RX ADMIN — BUDESONIDE AND FORMOTEROL FUMARATE DIHYDRATE 2 PUFF: 160; 4.5 AEROSOL RESPIRATORY (INHALATION) at 16:35

## 2017-04-10 RX ADMIN — FERROUS SULFATE TAB 325 MG (65 MG ELEMENTAL FE) 325 MG: 325 (65 FE) TAB at 07:45

## 2017-04-10 RX ADMIN — GUAIFENESIN 1200 MG: 600 TABLET, EXTENDED RELEASE ORAL at 17:01

## 2017-04-10 RX ADMIN — FERROUS SULFATE TAB 325 MG (65 MG ELEMENTAL FE) 325 MG: 325 (65 FE) TAB at 11:49

## 2017-04-10 RX ADMIN — GUAIFENESIN 1200 MG: 600 TABLET, EXTENDED RELEASE ORAL at 08:39

## 2017-04-10 RX ADMIN — ACETYLCYSTEINE 4 ML: 200 SOLUTION ORAL; RESPIRATORY (INHALATION) at 09:11

## 2017-04-10 RX ADMIN — HYDROCORTISONE 2.5%: 25 CREAM TOPICAL at 17:01

## 2017-04-10 RX ADMIN — BUDESONIDE AND FORMOTEROL FUMARATE DIHYDRATE 2 PUFF: 160; 4.5 AEROSOL RESPIRATORY (INHALATION) at 20:44

## 2017-04-10 RX ADMIN — FIDAXOMICIN 200 MG: 200 TABLET, FILM COATED ORAL at 21:18

## 2017-04-10 NOTE — PROGRESS NOTES
"Patient Name: Agnieszka Rizzo  :1930  86 y.o.      Patient Care Team:  Matt Rose MD as PCP - General (Family Medicine)  Marcie Robbins MD as Consulting Physician (Cardiology)  Nilesh Danielle MD as Consulting Physician (Urology)    Interval History:   Wt stable    Subjective:  Following for a fib and diastolic HF. Normal BM this AM    Objective   Vital Signs  Temp:  [97.8 °F (36.6 °C)-98.1 °F (36.7 °C)] 97.8 °F (36.6 °C)  Heart Rate:  [77-93] 93  Resp:  [16-20] 16  BP: (106-130)/(56-86) 130/74    Intake/Output Summary (Last 24 hours) at 04/10/17 0830  Last data filed at 04/10/17 0545   Gross per 24 hour   Intake              120 ml   Output             1950 ml   Net            -1830 ml     Flowsheet Rows         First Filed Value    Admission Height  64\" (162.6 cm) Documented at 2017 1650    Admission Weight  115 lb (52.2 kg) Documented at 2017 1650          Physical Exam:   General Appearance:    Alert, cooperative, in no acute distress   Lungs:     Clear to auscultation.  Normal respiratory effort and rate.      Heart:    regularly irregular.  Controlled response..     Chest Wall:    No abnormalities observed   Abdomen:     Soft, nontender, positive bowel sounds.     Extremities:   1+ pitting edema bilateral lower extremities       Results Review:      Results from last 7 days  Lab Units 17  0742   SODIUM mmol/L 129*   POTASSIUM mmol/L 3.9   CHLORIDE mmol/L 91*   TOTAL CO2 mmol/L 30.4*   BUN mg/dL 6*   CREATININE mg/dL 0.47*   GLUCOSE mg/dL 98   CALCIUM mg/dL 8.5*           Results from last 7 days  Lab Units 17  0707   WBC 10*3/mm3 12.65*   HEMOGLOBIN g/dL 9.1*   HEMATOCRIT % 26.4*   PLATELETS 10*3/mm3 341               Results from last 7 days  Lab Units 17  0806   MAGNESIUM mg/dL 1.7             Medication Review:     acetylcysteine 4 mL Nebulization 4x Daily - RT   atorvastatin 40 mg Oral Daily   budesonide-formoterol 2 puff Inhalation BID - RT   cholecalciferol " 1,000 Units Oral Daily   dabigatran etexilate 150 mg Oral Q12H   diltiaZEM  mg Oral Q24H   famotidine 20 mg Oral Daily   ferrous sulfate 325 mg Oral TID With Meals   fidaxomicin 200 mg Oral Q12H   furosemide 40 mg Oral Daily   guaiFENesin 1,200 mg Oral BID   hydrocortisone  Rectal BID   ipratropium-albuterol 3 mL Nebulization 4x Daily - RT             Assessment/Plan     1. Atrial fibrillation. She failed cardioversion in January 2017. She has a CHADS2-Vasc score of 5. She is anticoagulated with Pradaxa. She is generally rate controlled. She is not a candidate for beta blockers because of lung disease.  2. Chronic diastolic heart failure.   3. Dyspnea on exertion. Multifactorial  4. Mitral valve prolapse with very mild mitral regurgitation.  5. Mild tricuspid regurgitation without pulmonary hypertension.  6. Nonobstructive coronary artery disease diagnosed by catheter in 2007. She has noted coronary artery calcification on CT scans. Nuclear stress 12/16 was normal.   7. Hypertension. Her blood pressure is controlled.  8. Hyperlipidemia. Zetia and atorvastatin  9. Hyponatremia. Stable.  10. C. difficile diarrhea. ID recs noted.   11. Anemia  12. Bronchiectasis and Aspergillus colonization       - I am going to give her a dose of IV Lasix this morning and switch her to torsemide.  I will give her dose of torsemide this afternoon and then starting tomorrow do it once daily  - Check BMP in the morning.  Probably will be okay for discharge tomorrow from my standpoint    Marcie Robbins MD, [facility]  04/10/17  8:30 AM

## 2017-04-10 NOTE — NURSING NOTE
Recalled ID per Dr. Jack request. Pt has prescription for 10 days of treatment. Pt. To follow up at [facility]. If pt. Develops worsening symptoms can contact ID clinic. Pt. Given number. Per phone call with Dr. Flowers. 04/10/17

## 2017-04-10 NOTE — PROGRESS NOTES
" LOS: 8 days   Primary Care Physician: Matt Rose MD     Subjective  Cough is much less.  A formed bowel movement today.  She still thinks that she needs to stay here until there is no further bloating.  We've discussed this multiple times that she could be discharged before that resolves.  Leg edema is much improved.  She is concerned about being seen at [facility] as suggested by the infectious disease staff      Vital Signs  Body mass index is 21.11 kg/(m^2).  Temp:  [97.7 °F (36.5 °C)-98.1 °F (36.7 °C)] 97.7 °F (36.5 °C)  Heart Rate:  [77-93] 82  Resp:  [16-20] 18  BP: (106-130)/(56-86) 108/58      Objective:  Vital signs: (most recent): Blood pressure 108/58, pulse 82, temperature 97.7 °F (36.5 °C), temperature source Oral, resp. rate 18, height 64\" (162.6 cm), weight 123 lb (55.8 kg), SpO2 95 %.  No fever.    Lungs:  Normal respiratory rate and normal effort.  No wheezes or rhonchi.  (Good expansion)  Heart: Tachycardia.  Irregular rhythm.  No murmur.   Abdomen: Abdomen is soft and distended (A little less today).  Hyperactive bowel sounds.   There is no abdominal tenderness.  There is no rebound tenderness.  There is no guarding.     Extremities: There is dependent edema (Bilateral firm brawny, no calf tenderness).    Neurological: Patient is alert.              Results Review:    I reviewed the patient's new clinical results.  I reviewed the patient's new imaging results and agree with the interpretation.      Results from last 7 days  Lab Units 04/07/17  0707 04/06/17  0644   WBC 10*3/mm3 12.65* 13.22*   HEMOGLOBIN g/dL 9.1* 8.4*   PLATELETS 10*3/mm3 341 286       Results from last 7 days  Lab Units 04/08/17  0742 04/07/17  0944   SODIUM mmol/L 129* 127*   POTASSIUM mmol/L 3.9 4.0   CHLORIDE mmol/L 91* 89*   TOTAL CO2 mmol/L 30.4* 23.2   BUN mg/dL 6* 5*   CREATININE mg/dL 0.47* 0.43*   CALCIUM mg/dL 8.5* 8.5*   GLUCOSE mg/dL 98 144*         Hemoglobin A1C:No results found for: HGBA1C    Glucose Range:No " results found for: POCGLU    Medication Review: Yes    Physical Therapy: Walked with therapy with contact-guard assistance 300 feet with rolling walker on 4/9/17    Assessment/Plan     Active Hospital Problems (** Indicates Principal Problem)    Diagnosis Date Noted   • **C. difficile colitis [A04.7] 03/11/2017   • Iron deficiency anemia [D50.9] 04/04/2017   • Hypokalemia [E87.6] 04/04/2017   • Asymptomatic bacteriuria [R82.71] 04/03/2017   • Pancolitis [K51.00] 04/02/2017   • DNR (do not resuscitate) [Z66] 03/12/2017   • Chronic diastolic CHF (congestive heart failure) [I50.32] 03/12/2017   • Sepsis [A41.9] 03/12/2017   • Hyponatremia [E87.1] 02/08/2017   • Chronic atrial fibrillation [I48.2] 12/01/2016      Resolved Hospital Problems    Diagnosis Date Noted Date Resolved   No resolved problems to display.       Assessment & Plan    C Diff colitis: Appears to be resolving  She is worried about going home and having symptoms recur as soon as she stops the Dificid similar to what she had previously.  She was told that the referral to [facility] may take as long as 10 days or 2 weeks.  I asked for infectious disease to see her again regarding this    Anemia: Iron deficient by labs. 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 Hgb stable and FOBT negative. PO iron replacement.    ASx Bacteriuria: No urinary symptoms and EColi on UCx. No need for antibiotics    MAC with Bronchiectasis: Off antibiotics due to recurrent CDiff.     Chronic AFib and chronic diastolic CHF: Switch to oral diuretic because of hyponatremia, rate stable cardioversion unsuccessful in 1/17, on Pradaxa no increase in diuretic currently.  Weight stable despite legs feeling swollen.  Another dose of IV Lasix per Dr. zaragoza switching to Torsemide orally.  Legs look a little improved.    Mitral regurgitation mild, tricuspid regurgitation with secondary pulmonary hypertension:  COPD on DuoNeb diltiazem and Symbicort off oxygen will  add Mucinex now and continue to continue treatment    Hx of SIADH noted. Sodium stable. Urine with appropriately low sodium. GI losses etiology    Hypokalemia resolved.    Hyponatremia: Chronic issue values are improving.  Will stop daily labs    Disposition: Home tomorrow if no issues    Mimi Jack MD  04/10/17  2:30 PM

## 2017-04-10 NOTE — PROGRESS NOTES
Continued Stay Note  [facility]     Patient Name: Agnieszka Rizzo  MRN: [id number]  Today's Date: 4/10/2017    Admit Date: 4/2/2017          Discharge Plan       04/10/17 1128    Case Management/Social Work Plan    Plan return home    Patient/Family In Agreement With Plan yes    Additional Comments Spoke with Naomi ( [phone]) at [facility] [employer] and patient has been approved for Dificid with a $0 co-payment.  Spoke with Alireza at [facility] ( [phone]) and he confirmed this and states that the script is ready for the patient.  Patient updated in room.  Discussed HH with her again, but she does not feel it is needed.  Plan is home.  CCP will follow.               Discharge Codes     None        Expected Discharge Date and Time     Expected Discharge Date Expected Discharge Time    Apr 7, 2017             Renita Cortes RN

## 2017-04-10 NOTE — PLAN OF CARE
Problem: Patient Care Overview (Adult)  Goal: Plan of Care Review  Outcome: Ongoing (interventions implemented as appropriate)    04/10/17 1534   Coping/Psychosocial Response Interventions   Plan Of Care Reviewed With patient   Patient Care Overview   Progress improving   Outcome Evaluation   Outcome Summary/Follow up Plan Pt. with decreasing stools. Appetite improving. Up to bathroom per self with walker. Probable discharge in Am. 04/10/17         Problem: Fall Risk (Adult)  Goal: Absence of Falls  Outcome: Ongoing (interventions implemented as appropriate)    Problem: Infection, Risk/Actual (Adult)  Goal: Infection Prevention/Resolution  Outcome: Ongoing (interventions implemented as appropriate)    Problem: Skin Integrity Impairment, Risk/Actual (Adult)  Goal: Skin Integrity/Wound Healing  Outcome: Ongoing (interventions implemented as appropriate)

## 2017-04-10 NOTE — PLAN OF CARE
Problem: Patient Care Overview (Adult)  Goal: Plan of Care Review  Outcome: Ongoing (interventions implemented as appropriate)    04/10/17 0649   Coping/Psychosocial Response Interventions   Plan Of Care Reviewed With patient   Patient Care Overview   Progress improving   Outcome Evaluation   Outcome Summary/Follow up Plan Pt states frustration with last breathing treatment of evening. Plan for DC being reviewed by MD. Monitor labs, vitals.       Goal: Discharge Needs Assessment  Outcome: Ongoing (interventions implemented as appropriate)    Problem: Fall Risk (Adult)  Goal: Absence of Falls  Outcome: Ongoing (interventions implemented as appropriate)    Problem: Infection, Risk/Actual (Adult)  Goal: Infection Prevention/Resolution  Outcome: Ongoing (interventions implemented as appropriate)    Problem: Skin Integrity Impairment, Risk/Actual (Adult)  Goal: Skin Integrity/Wound Healing  Outcome: Ongoing (interventions implemented as appropriate)

## 2017-04-10 NOTE — PROGRESS NOTES
Acute Care - Physical Therapy Treatment Note  [facility]     Patient Name: Agnieszka Rizzo  : 1930  MRN: [id number]  Today's Date: 4/10/2017  Onset of Illness/Injury or Date of Surgery Date: 17          Admit Date: 2017    Visit Dx:    ICD-10-CM ICD-9-CM   1. Pancolitis K51.00 556.6   2. Leukocytosis, unspecified type D72.829 288.60   3. Generalized weakness R53.1 780.79     Patient Active Problem List   Diagnosis   • Pneumothorax of right lung after biopsy   • Pulmonary aspergillosis   • Benign essential hypertension   • Chronic coronary artery disease   • Hyperlipidemia   • Mitral valve insufficiency   • Ventricular premature beats   • Ventricular tachycardia   • Chronic atrial fibrillation   • Pneumonia   • Pneumonia with the fungal infection aspergillosis   • Acute respiratory failure with hypoxia   • Acid-fast bacteria present   • Hyponatremia   • C. difficile colitis   • DNR (do not resuscitate)   • Sepsis   • Chronic diastolic CHF (congestive heart failure)   • CHF (congestive heart failure)   • Pancolitis   • Asymptomatic bacteriuria   • Iron deficiency anemia   • Hypokalemia               Adult Rehabilitation Note       04/10/17 1000 17 0900 17 1000    Rehab Assessment/Intervention    Discipline physical therapy assistant  -CW physical therapist  - physical therapy assistant  -    Document Type therapy note (daily note)  -CW progress note  - therapy note (daily note)  -    Subjective Information agree to therapy;complains of;fatigue  - no complaints  - no complaints  -RH    Patient Effort, Rehab Treatment good  -CW good  - good  -RH    Precautions/Limitations fall precautions  -CW fall precautions  - fall precautions  -RH    Recorded by [CW] Colt Paniagua [] Ivan Velez, PT [RH] Jay Rivers, PTA    Vital Signs    O2 Delivery Pre Treatment room air  -CW  room air  -RH    Recorded by [] Colt Paniagua  [] Jay Rivers PTA    Pain  Assessment    Pain Assessment No/denies pain  -CW No/denies pain  -DH No/denies pain  -RH    Recorded by [CW] Colt Paniagua [] Ivan Velez, PT [RH] aJy Rivers, PTA    Cognitive Assessment/Intervention    Current Cognitive/Communication Assessment functional  -CW functional  -DH functional  -RH    Orientation Status oriented x 4  -CW oriented x 4  -DH     Follows Commands/Answers Questions 100% of the time  -% of the time  -     Personal Safety WNL/WFL  -CW WNL/WFL  -DH     Personal Safety Interventions fall prevention program maintained;gait belt;nonskid shoes/slippers when out of bed  -CW fall prevention program maintained  -DH     Recorded by [CW] Colt Paniagua [] Ivan Velez, PT [RH] Jay Rivers, PTA    ROM (Range of Motion)    General ROM   no range of motion deficits identified  -RH    Recorded by   [RH] Jay Rivers, EUGENIE    Bed Mobility, Assessment/Treatment    Bed Mob, Supine to Sit, [place] independent  -CW independent  -DH independent  -RH    Bed Mob, Sit to Supine, [place] independent  -CW independent  -DH independent  -RH    Recorded by [CW] Colt Paniagua [] Ivan Velez, PT [RH] Jay Rivers, PTA    Transfer Assessment/Treatment    Transfers, Bed-Chair [place]  conditional independence  -DH conditional independence;stand by assist  -RH    Transfers, Chair-Bed [place]  conditional independence  -DH conditional independence;stand by assist  -RH    Transfers, Bed-Chair-Bed, Assist Device  rolling walker  -DH standard walker  -RH    Transfers, Sit-Stand [place] conditional independence  -CW conditional independence  -DH conditional independence  -RH    Transfers, Stand-Sit [place] conditional independence  -CW conditional independence  -DH conditional independence  -RH    Transfers, Sit-Stand-Sit, Assist Device rolling walker  -CW rolling walker  -DH rolling walker  -RH    Recorded by [CW] Colt Paniagua []  Ivan Velez, PT [] Jay Rivers, PTA    Gait Assessment/Treatment    Gait, [place] Level stand by assist  - stand by assist  - stand by assist  -    Gait, Assistive Device rolling walker  - rolling walker  - --   no AD used  -    Gait, Distance (Feet) 300  -CW --   verbal cuing with hip flexion during swing through  - 300  -RH    Gait, Gait Deviations cecilia decreased;step length decreased;stride length decreased  -  cecilia decreased   intermittent foot drag  -    Gait, Safety Issues   sequencing ability decreased;step length decreased  -    Gait, Impairments   impaired balance  -RH    Recorded by [] Colt Paniagua [] Ivan Velez, PT [] Jay Rivers, PTA    Functional Mobility    Functional Mobility- Ind. Level   conditional independence;supervision required  -RH    Recorded by   [] Jay Rivers PTA    Balance Skills Training    Sitting-Level of Assistance   Independent  -    Sitting-Balance Support   Feet supported  -    Standing-Level of Assistance   Independent;Distant supervision  -    Static Standing Balance Support   assistive device  -RH    Recorded by   [] Jay Rivers PTA    Positioning and Restraints    Pre-Treatment Position in bed  -CW sitting in chair/recliner  - in bed  -RH    Post Treatment Position chair  -CW chair  -DH bathroom  -    In Chair notified nsg;reclined;call light within reach;encouraged to call for assist  -CW      Bathroom  sitting;call light within reach;encouraged to call for assist  - sitting;call light within reach  -    Recorded by [] Colt Paniagua [] Ivan Velez, PT [] Jay Rivers, PTA      04/07/17 1100          Rehab Assessment/Intervention    Discipline physical therapy assistant  -      Document Type therapy note (daily note)  -CW      Subjective Information agree to therapy;complains of;fatigue  -CW      Patient Effort, Rehab Treatment good  -CW       Precautions/Limitations fall precautions  -CW      Recorded by [CW] Colt Paniagua      Vital Signs    O2 Delivery Pre Treatment supplemental O2  -CW      Recorded by [CW] Colt Paniagua      Pain Assessment    Pain Assessment No/denies pain  -CW      Recorded by [CW] Colt Paniagua      Cognitive Assessment/Intervention    Current Cognitive/Communication Assessment functional  -CW      Orientation Status oriented x 4  -CW      Follows Commands/Answers Questions 100% of the time  -CW      Personal Safety WNL/WFL  -CW      Personal Safety Interventions fall prevention program maintained;gait belt;nonskid shoes/slippers when out of bed  -CW      Recorded by [CW] Colt Paniagua      Bed Mobility, Assessment/Treatment    Bed Mob, Supine to Sit, [place] supervision required  -CW      Bed Mob, Sit to Supine, [place] supervision required  -CW      Recorded by [CW] Colt Paniagua      Transfer Assessment/Treatment    Transfers, Sit-Stand [place] stand by assist  -CW      Transfers, Stand-Sit [place] stand by assist  -CW      Transfers, Sit-Stand-Sit, Assist Device rolling walker  -CW      Recorded by [CW] Colt Paniagua      Gait Assessment/Treatment    Gait, [place] Level contact guard assist  -CW      Gait, Distance (Feet) 300  -CW      Gait, Gait Deviations cecilia decreased;step length decreased;stride length decreased  -CW      Recorded by [CW] Colt Paniagua      Positioning and Restraints    Pre-Treatment Position in bed  -CW      Post Treatment Position bed  -CW      In Bed notified nsg;sitting EOB;call light within reach;encouraged to call for assist  -CW      Recorded by [CW] Colt Paniagua        User Key  (r) = Recorded By, (t) = Taken By, (c) = Cosigned By    Initials Name Effective Dates    RH Jay Rivers, PTA 02/18/16 -      Ivan Velez, PT 06/22/16 -     CW Colt Paniagua 12/13/16 -                 IP PT Goals       04/09/17 0958  04/06/17 1338       Bed Mobility PT LTG    Bed Mobility PT LTG, Time to Achieve 2 days  -DH 1 wk  -CH     Bed Mobility PT LTG, Activity Type all bed mobility  -DH all bed mobility  -CH     Bed Mobility PT LTG, [place] Level conditional independence  -DH independent  -CH     Bed Mobility PT LTG, Outcome goal met  -DH      Transfer Training PT LTG    Transfer Training PT LTG, Time to Achieve 1 day  -DH 1 wk  -CH     Transfer Training PT LTG, Activity Type  all transfers  -CH     Transfer Training PT LTG, [place] Level conditional independence  -DH independent  -CH     Gait Training PT LTG    Gait Training Goal PT LTG, Time to Achieve 1 day  -DH 1 wk  -CH     Gait Training Goal PT LTG, [place] Level contact guard assist  -DH independent  -CH     Gait Training Goal PT LTG, Assist Device walker, rolling  -DH      Gait Training Goal PT LTG, Distance to Achieve 400 ft with rwx  -  -CH     Gait Training Goal PT LTG, Outcome goal partially met  -DH        User Key  (r) = Recorded By, (t) = Taken By, (c) = Cosigned By    Initials Name Provider Type     Keren Gray, PT Physical Therapist     Ivan Velez, PT Physical Therapist          Physical Therapy Education     Title: PT OT SLP Therapies (Done)     Topic: Physical Therapy (Done)     Point: Mobility training (Done)    Learning Progress Summary    Learner Readiness Method Response Comment Documented by Status   Patient Acceptance E,TB DU,VU  CW 04/10/17 1018 Done    Eager E [facility] 04/09/17 0958 Done    Acceptance E,TB DU,VU  CW 04/07/17 1139 Done    Acceptance E,TB,D NR,[facility] 04/06/17 1338 Done               Point: Body mechanics (Done)    Learning Progress Summary    Learner Readiness Method Response Comment Documented by Status   Patient Acceptance E,TB DU,VU  CW 04/10/17 1018 Done    Eager E VU  DH 04/09/17 0958 Done    Acceptance E,TB DU,VU  CW 04/07/17 1139 Done    Acceptance E,TB,D NR,[facility] 04/06/17 1338 Done               Point:  Precautions (Done)    Learning Progress Summary    Learner Readiness Method Response Comment Documented by Status   Patient Acceptance E,TB DU,VU   04/10/17 1018 Done    Eager E [facility] 04/09/17 0958 Done    Acceptance E,TB DU,VU   04/07/17 1139 Done    Acceptance E,TB,D NR,VU   04/06/17 1338 Done                      User Key     Initials Effective Dates Name Provider Type Discipline     12/01/15 -  Keren Gray, PT Physical Therapist PT     06/22/16 -  Ivan Velez, PT Physical Therapist PT     12/13/16 -  Colt Paniagua Physical Therapy Assistant PT                    PT Recommendation and Plan  Anticipated Discharge Disposition: home with home health  Planned Therapy Interventions: balance training, bed mobility training, gait training, home exercise program, patient/family education, transfer training  PT Frequency: daily  Plan of Care Review  Plan Of Care Reviewed With: patient  Progress: progress toward functional goals as expected  Outcome Summary/Follow up Plan: Pt unsteady with HHA used RWX today improved with gait pattern          Outcome Measures       04/10/17 1000 04/09/17 1000 04/08/17 1000    How much help from another person do you currently need...    Turning from your back to your side while in flat bed without using bedrails? 4  -CW 4  -DH 4  -RH    Moving from lying on back to sitting on the side of a flat bed without bedrails? 4  -CW 4  -DH 4  -RH    Moving to and from a bed to a chair (including a wheelchair)? 4  -CW 4  -DH 4  -RH    Standing up from a chair using your arms (e.g., wheelchair, bedside chair)? 4  -CW 4  -DH 4  -RH    Climbing 3-5 steps with a railing? 3  -CW 3  -DH 3  -RH    To walk in hospital room? 3  -CW 3  -DH 3  -RH    AM-PAC 6 Clicks Score 22  - 22  - 22  -RH    Functional Assessment    Outcome Measure Options AM-PAC 6 Clicks Basic Mobility (PT)  - AM-PAC 6 Clicks Basic Mobility (PT)  -       04/07/17 1100          How much help from another  person do you currently need...    Turning from your back to your side while in flat bed without using bedrails? 3  -CW      Moving from lying on back to sitting on the side of a flat bed without bedrails? 3  -CW      Moving to and from a bed to a chair (including a wheelchair)? 3  -CW      Standing up from a chair using your arms (e.g., wheelchair, bedside chair)? 3  -CW      Climbing 3-5 steps with a railing? 2  -CW      To walk in hospital room? 3  -CW      AM-PAC 6 Clicks Score 17  -CW      Functional Assessment    Outcome Measure Options AM-PAC 6 Clicks Basic Mobility (PT)  -CW        User Key  (r) = Recorded By, (t) = Taken By, (c) = Cosigned By    Initials Name Provider Type    RH Jay Rivers, PTA Physical Therapy Assistant     Ivan Velez, PT Physical Therapist    CW Colt Paniagua Physical Therapy Assistant           Time Calculation:         PT Charges       04/10/17 1019          Time Calculation    Start Time 1002  -CW      Stop Time 1019  -CW      Time Calculation (min) 17 min  -CW      PT Received On 04/10/17  -CW      PT - Next Appointment 04/11/17  -CW        User Key  (r) = Recorded By, (t) = Taken By, (c) = Cosigned By    Initials Name Provider Type    CW Colt Paniagua Physical Therapy Assistant          Therapy Charges for Today     Code Description Service Date Service Provider Modifiers Qty    09495882139 HC PT THER PROC EA 15 MIN 4/10/2017 Colt Paniagua GP 1    79454298224 HC PT THER SUPP EA 15 MIN 4/10/2017 Colt Paniagua GP 1          PT G-Codes  Outcome Measure Options: AM-PAC 6 Clicks Basic Mobility (PT)    Colt Paniagua  4/10/2017

## 2017-04-10 NOTE — PLAN OF CARE
Problem: Patient Care Overview (Adult)  Goal: Plan of Care Review  Outcome: Ongoing (interventions implemented as appropriate)    04/10/17 1018   Coping/Psychosocial Response Interventions   Plan Of Care Reviewed With patient   Patient Care Overview   Progress progress toward functional goals as expected   Outcome Evaluation   Outcome Summary/Follow up Plan Pt unsteady with HHA used RWX today improved with gait pattern

## 2017-04-11 VITALS
OXYGEN SATURATION: 94 % | HEIGHT: 64 IN | BODY MASS INDEX: 19.81 KG/M2 | RESPIRATION RATE: 16 BRPM | DIASTOLIC BLOOD PRESSURE: 79 MMHG | HEART RATE: 87 BPM | WEIGHT: 116 LBS | SYSTOLIC BLOOD PRESSURE: 131 MMHG | TEMPERATURE: 98.2 F

## 2017-04-11 LAB
ANION GAP SERPL CALCULATED.3IONS-SCNC: 10.1 MMOL/L
BUN BLD-MCNC: 7 MG/DL (ref 8–23)
BUN/CREAT SERPL: 15.6 (ref 7–25)
CALCIUM SPEC-SCNC: 8.1 MG/DL (ref 8.6–10.5)
CHLORIDE SERPL-SCNC: 91 MMOL/L (ref 98–107)
CO2 SERPL-SCNC: 31.9 MMOL/L (ref 22–29)
CREAT BLD-MCNC: 0.45 MG/DL (ref 0.57–1)
GFR SERPL CREATININE-BSD FRML MDRD: 132 ML/MIN/1.73
GLUCOSE BLD-MCNC: 92 MG/DL (ref 65–99)
POTASSIUM BLD-SCNC: 3.5 MMOL/L (ref 3.5–5.2)
SODIUM BLD-SCNC: 133 MMOL/L (ref 136–145)

## 2017-04-11 PROCEDURE — 99232 SBSQ HOSP IP/OBS MODERATE 35: CPT | Performed by: INTERNAL MEDICINE

## 2017-04-11 PROCEDURE — 94799 UNLISTED PULMONARY SVC/PX: CPT

## 2017-04-11 PROCEDURE — 80048 BASIC METABOLIC PNL TOTAL CA: CPT | Performed by: INTERNAL MEDICINE

## 2017-04-11 RX ORDER — FERROUS SULFATE 325(65) MG
325 TABLET ORAL
Qty: 90 TABLET | Refills: 0 | Status: SHIPPED | OUTPATIENT
Start: 2017-04-11 | End: 2017-05-11

## 2017-04-11 RX ORDER — TORSEMIDE 20 MG/1
40 TABLET ORAL DAILY
Qty: 60 TABLET | Refills: 0 | Status: SHIPPED | OUTPATIENT
Start: 2017-04-11 | End: 2017-05-02 | Stop reason: SDUPTHER

## 2017-04-11 RX ADMIN — TORSEMIDE 40 MG: 20 TABLET ORAL at 09:30

## 2017-04-11 RX ADMIN — FIDAXOMICIN 200 MG: 200 TABLET, FILM COATED ORAL at 09:30

## 2017-04-11 RX ADMIN — IPRATROPIUM BROMIDE AND ALBUTEROL SULFATE 3 ML: .5; 3 SOLUTION RESPIRATORY (INHALATION) at 12:01

## 2017-04-11 RX ADMIN — FAMOTIDINE 20 MG: 20 TABLET, FILM COATED ORAL at 07:37

## 2017-04-11 RX ADMIN — DABIGATRAN ETEXILATE MESYLATE 150 MG: 150 CAPSULE ORAL at 09:31

## 2017-04-11 RX ADMIN — FERROUS SULFATE TAB 325 MG (65 MG ELEMENTAL FE) 325 MG: 325 (65 FE) TAB at 07:37

## 2017-04-11 RX ADMIN — HYDROCORTISONE 2.5%: 25 CREAM TOPICAL at 09:31

## 2017-04-11 RX ADMIN — IPRATROPIUM BROMIDE AND ALBUTEROL SULFATE 3 ML: .5; 3 SOLUTION RESPIRATORY (INHALATION) at 07:45

## 2017-04-11 RX ADMIN — BUDESONIDE AND FORMOTEROL FUMARATE DIHYDRATE 2 PUFF: 160; 4.5 AEROSOL RESPIRATORY (INHALATION) at 07:45

## 2017-04-11 RX ADMIN — VITAMIN D, TAB 1000IU (100/BT) 1000 UNITS: 25 TAB at 09:30

## 2017-04-11 RX ADMIN — FERROUS SULFATE TAB 325 MG (65 MG ELEMENTAL FE) 325 MG: 325 (65 FE) TAB at 12:39

## 2017-04-11 RX ADMIN — ACETYLCYSTEINE 4 ML: 200 SOLUTION ORAL; RESPIRATORY (INHALATION) at 07:45

## 2017-04-11 RX ADMIN — DILTIAZEM HYDROCHLORIDE 360 MG: 180 CAPSULE, COATED, EXTENDED RELEASE ORAL at 09:30

## 2017-04-11 RX ADMIN — ACETYLCYSTEINE 4 ML: 200 SOLUTION ORAL; RESPIRATORY (INHALATION) at 12:01

## 2017-04-11 RX ADMIN — GUAIFENESIN 1200 MG: 600 TABLET, EXTENDED RELEASE ORAL at 09:31

## 2017-04-11 RX ADMIN — ATORVASTATIN CALCIUM 40 MG: 40 TABLET, FILM COATED ORAL at 09:30

## 2017-04-11 NOTE — PLAN OF CARE
Problem: Patient Care Overview (Adult)  Goal: Plan of Care Review  Outcome: Outcome(s) achieved Date Met:  04/11/17 04/11/17 1204   Coping/Psychosocial Response Interventions   Plan Of Care Reviewed With patient   Patient Care Overview   Progress progress toward functional goals as expected   Outcome Evaluation   Outcome Summary/Follow up Plan Pt. dischare to home today. Will await follow up at [facility]. Pt. states stools more formed than past.. Instructed on discharge paperwork and education materials provided.04/11/17         Problem: Fall Risk (Adult)  Goal: Absence of Falls  Outcome: Outcome(s) achieved Date Met:  04/11/17    Problem: Infection, Risk/Actual (Adult)  Goal: Infection Prevention/Resolution  Outcome: Outcome(s) achieved Date Met:  04/11/17    Problem: Skin Integrity Impairment, Risk/Actual (Adult)  Goal: Skin Integrity/Wound Healing  Outcome: Outcome(s) achieved Date Met:  04/11/17

## 2017-04-11 NOTE — PLAN OF CARE
Problem: Patient Care Overview (Adult)  Goal: Plan of Care Review  Outcome: Ongoing (interventions implemented as appropriate)    04/11/17 0531   Coping/Psychosocial Response Interventions   Plan Of Care Reviewed With patient   Patient Care Overview   Progress improving   Outcome Evaluation   Outcome Summary/Follow up Plan Patient reports a formed stool today. Probable DC in am. Monitor labs/vitals.       Goal: Discharge Needs Assessment  Outcome: Ongoing (interventions implemented as appropriate)    Problem: Fall Risk (Adult)  Goal: Absence of Falls  Outcome: Ongoing (interventions implemented as appropriate)    Problem: Infection, Risk/Actual (Adult)  Goal: Infection Prevention/Resolution  Outcome: Ongoing (interventions implemented as appropriate)    Problem: Skin Integrity Impairment, Risk/Actual (Adult)  Goal: Skin Integrity/Wound Healing  Outcome: Ongoing (interventions implemented as appropriate)

## 2017-04-11 NOTE — PROGRESS NOTES
Continued Stay Note  [facility]     Patient Name: Agnieszka Rizzo  MRN: [id number]  Today's Date: 4/11/2017    Admit Date: 4/2/2017          Discharge Plan       04/11/17 1515    Case Management/Social Work Plan    Plan Return home.    Patient/Family In Agreement With Plan yes    Final Note    Final Note Patient has been DC'd home.                Discharge Codes       04/11/17 1514    Discharge Codes    Discharge Codes 01  Discharge to home        Expected Discharge Date and Time     Expected Discharge Date Expected Discharge Time    Apr 11, 2017             Becky S. [place], RN

## 2017-04-11 NOTE — PROGRESS NOTES
"Patient Name: Agnieszka Rizzo  :1930  86 y.o.      Patient Care Team:  Matt Rose MD as PCP - General (Family Medicine)  Marcie Robbins MD as Consulting Physician (Cardiology)  Nilesh Danielle MD as Consulting Physician (Urology)    Interval History:   Status post IV Lasix and torsemide    Subjective:  Following for atrial fibrillation and diastolic heart failure.  She feels her abdomen is less swollen than her leg is better.     Objective   Vital Signs  Temp:  [97 °F (36.1 °C)-98.2 °F (36.8 °C)] 98.2 °F (36.8 °C)  Heart Rate:  [] 120  Resp:  [16-18] 16  BP: (108-131)/(58-91) 131/79    Intake/Output Summary (Last 24 hours) at 17 0959  Last data filed at 17 0545   Gross per 24 hour   Intake                0 ml   Output             3450 ml   Net            -3450 ml     Flowsheet Rows         First Filed Value    Admission Height  64\" (162.6 cm) Documented at 2017 1650    Admission Weight  115 lb (52.2 kg) Documented at 2017 1650          Physical Exam:   General Appearance:    Alert, cooperative, in no acute distress   Lungs:     Clear to auscultation.  Normal respiratory effort and rate.      Heart:    regularly irregular.  Mildly tachycardic..     Chest Wall:    No abnormalities observed   Abdomen:     Soft, nontender, positive bowel sounds.     Extremities:   1+ right lower extremity edema      Results Review:      Results from last 7 days  Lab Units 17  0521   SODIUM mmol/L 133*   POTASSIUM mmol/L 3.5   CHLORIDE mmol/L 91*   TOTAL CO2 mmol/L 31.9*   BUN mg/dL 7*   CREATININE mg/dL 0.45*   GLUCOSE mg/dL 92   CALCIUM mg/dL 8.1*           Medication Review:     acetylcysteine 4 mL Nebulization 4x Daily - RT   atorvastatin 40 mg Oral Daily   budesonide-formoterol 2 puff Inhalation BID - RT   cholecalciferol 1,000 Units Oral Daily   dabigatran etexilate 150 mg Oral Q12H   diltiaZEM  mg Oral Q24H   famotidine 20 mg Oral Daily   ferrous sulfate 325 mg Oral TID " With Meals   fidaxomicin 200 mg Oral Q12H   guaiFENesin 1,200 mg Oral BID   hydrocortisone  Rectal BID   ipratropium-albuterol 3 mL Nebulization 4x Daily - RT   torsemide 40 mg Oral Daily             Assessment/Plan     1. Atrial fibrillation. She failed cardioversion in January 2017. She has a CHADS2-Vasc score of 5. She is anticoagulated with Pradaxa. She is generally rate controlled. She is not a candidate for beta blockers because of lung disease.  2. Chronic diastolic heart failure.   3. Dyspnea on exertion. Multifactorial  4. Mitral valve prolapse with very mild mitral regurgitation.  5. Mild tricuspid regurgitation without pulmonary hypertension.  6. Nonobstructive coronary artery disease diagnosed by catheter in 2007. She has noted coronary artery calcification on CT scans. Nuclear stress 12/16 was normal.   7. Hypertension. Her blood pressure is controlled.  8. Hyperlipidemia. Zetia and atorvastatin  9. Hyponatremia. Stable.  10. C. difficile diarrhea. ID recs noted.   11. Anemia  12. Bronchiectasis and Aspergillus colonization    - She had pretty good diuresis yesterday.  I'm going to check in with her nurse later this morning and see how she responded to the oral torsemide.  I may give her an additional dose of Lasix prior to discharge today.  - She has a follow-up scheduled with me on April 17 which I would like her to keep.  - Discharge on torsemide 40 mg a day    Marcie Robbins MD, [facility]  04/11/17  9:59 AM

## 2017-04-11 NOTE — DISCHARGE SUMMARY
Date of Admission: 4/2/2017  Date of Discharge:  4/11/2017  Primary Care Physician: Matt Rose MD     Discharge Diagnosis:  Principal Problem:    C. difficile colitis  Active Problems:    Chronic atrial fibrillation    Hyponatremia    DNR (do not resuscitate)    Sepsis    Chronic diastolic CHF (congestive heart failure)    Pancolitis    Asymptomatic bacteriuria    Iron deficiency anemia    Hypokalemia      DETAILS OF HOSPITAL STAY     Pertinent Test Results and Procedures Performed    CT scan of the abdomen and pelvis:  1.  Pancolitis. Mild ascites.  2. Multiple hepatic low-density lesions without change. The larger of  these are consistent with cysts, but several lesions are too small to  characterize. Large left renal cyst.  3.  No interval change in pericardial effusion.     Hospital Course    This is a 86-year-old white female who presented to the emergency room with complaints of diarrhea and abdominal pain.  Please see H&P for full details of admission.  She has a history of recurrent C. difficile.  She was found to be positive again on this admission.  She was seen by infectious disease and placed on Dificid.  She has responded well to this treatment and her bowel movements are more formed and less frequent.  She will complete 10 total days of this which will end tomorrow evening.  An outpatient referral has been made to [facility] department for evaluation to consider fecal transplant.  The patient was followed by cardiology for A. fib and congestive heart failure.  She came in on Lasix but this has been switched to torsemide.  She has received intermittent dosing of IV Lasix with good effect.  The patient is feeling much better today.  Plan is for her to return home at discharge.  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  Infectious disease recommended she remain off antibiotics for this due to her recurrent C. difficile.  She had issues with hypokalemia and  hyponatremia that are now improved.  At this point she is medically stable and ready for discharge.    Consults:   Consults     Date and Time Order Name Status Description    4/2/2017 2108 Inpatient Consult to Infectious Diseases Completed     4/2/2017 2108 Inpatient Consult to Cardiology Completed     4/2/2017 1945 [facility] (on-call MD unless specified) Completed     3/12/2017 0150 Inpatient Consult to Infectious Diseases Completed     3/12/2017 0150 Inpatient Consult to Cardiology Completed     3/11/2017 1921 [facility] (on-call MD unless specified) Completed             Condition on Discharge: Stable    Discharge Disposition  Home or Self Care    Discharge Medications   Agnieszka Rizzo   Home Medication Instructions SILVIA:[id number]    Printed on:04/11/17 1156   Medication Information                      acetylcysteine (MUCOMYST) 20 % nebulizer solution  Take 4 mL by nebulization 4 (Four) Times a Day.             ADVAIR -21 MCG/ACT inhaler  Inhale 2 puffs 2 (Two) Times a Day.             atorvastatin (LIPITOR) 40 MG tablet  Take 1 tablet by mouth Daily.             B Complex-C (SUPER B COMPLEX PO)  Take  by mouth.             cholecalciferol (VITAMIN D3) 1000 UNITS tablet  Take 1,000 Units by mouth Daily.             dabigatran etexilate (PRADAXA) 150 MG capsu  Take 1 capsule by mouth Every 12 (Twelve) Hours.             diltiaZEM CD (CARDIZEM CD) 360 MG 24 hr capsule  TAKE ONE CAPSULE BY MOUTH DAILY             ezetimibe (ZETIA) 10 MG tablet  Take 1 tablet by mouth Daily.             ferrous sulfate 325 (65 FE) MG tablet  Take 1 tablet by mouth 3 (Three) Times a Day With Meals for 30 days.             fidaxomicin (DIFICID) 200 MG tablet  Take 1 tablet by mouth Every 12 (Twelve) Hours for 2 days. Indications: Clostridium difficile Bacteria             losartan (COZAAR) 50 MG tablet  50 mg 2 (Two) Times a Day.             Multiple Vitamin (MULTIVITAMIN) tablet  Take 1 tablet by mouth Daily.             pantoprazole  (PROTONIX) 40 MG EC tablet  Take 1 tablet by mouth Daily.             torsemide (DEMADEX) 20 MG tablet  Take 2 tablets by mouth Daily for 30 days.             VERAMYST 27.5 MCG/SPRAY nasal spray  1 spray into each nostril Daily.                 Discharge Diet:   Diet Instructions     Diet: Regular, Cardiac, Specialty Diet; Thin Liquids, No Restrictions; Low Sodium       Discharge Diet:   Regular  Cardiac  Specialty Diet      Fluid Consistency:  Thin Liquids, No Restrictions   Specialty Diets:  Low Sodium                 Activity at Discharge:   Activity Instructions     Activity as Tolerated                     Follow-up Appointments  Future Appointments  Date Time Provider Department Center   4/13/2017 11:40 AM Marcie Robbins MD [facility] None   4/17/2017 11:00 AM Matt Rose MD [facility] None     Additional Instructions for the Follow-ups that You Need to Schedule     Discharge Follow-Up With Specified Provider    As directed    To:  Dr. Robbins   Follow Up:  1 Week       Discharge Follow-up with PCP    As directed    Follow Up Details:  In 1 week       Discharge Follow-up with Specialty    As directed    Specialty:  [facility] fecal transplant clinic   Follow Up Details:  outpatient referal has been made and currently pending                   I have examined and discussed discharge planning with the patient today.     Brandan Zee MD  04/11/17  11:54 AM    Time: Discharge greater than 30 min

## 2017-04-11 NOTE — PLAN OF CARE
Problem: Inpatient Physical Therapy  Goal: Bed Mobility Goal LTG- PT  Outcome: Outcome(s) achieved Date Met:  04/11/17 04/11/17 1158   Bed Mobility PT LTG   Bed Mobility PT LTG, Outcome goal met       Goal: Transfer Training Goal 1 LTG- PT  Outcome: Outcome(s) achieved Date Met:  04/11/17 04/11/17 1158   Transfer Training PT LTG   Transfer Training PT LTG, Outcome goal met       Goal: Gait Training Goal LTG- PT  Outcome: Outcome(s) achieved Date Met:  04/11/17 04/11/17 1158   Gait Training PT LTG   Gait Training Goal PT LTG, Outcome goal met

## 2017-04-11 NOTE — THERAPY DISCHARGE NOTE
Acute Care - Physical Therapy Treatment Note/Discharge  [facility]     Patient Name: Agnieszka Rizzo  : 1930  MRN: [id number]  Today's Date: 2017  Onset of Illness/Injury or Date of Surgery Date: 17          Admit Date: 2017    Visit Dx:    ICD-10-CM ICD-9-CM   1. Pancolitis K51.00 556.6   2. Leukocytosis, unspecified type D72.829 288.60   3. Generalized weakness R53.1 780.79     Patient Active Problem List   Diagnosis   • Pneumothorax of right lung after biopsy   • Pulmonary aspergillosis   • Benign essential hypertension   • Chronic coronary artery disease   • Hyperlipidemia   • Mitral valve insufficiency   • Ventricular premature beats   • Ventricular tachycardia   • Chronic atrial fibrillation   • Pneumonia   • Pneumonia with the fungal infection aspergillosis   • Acute respiratory failure with hypoxia   • Acid-fast bacteria present   • Hyponatremia   • C. difficile colitis   • DNR (do not resuscitate)   • Sepsis   • Chronic diastolic CHF (congestive heart failure)   • CHF (congestive heart failure)   • Pancolitis   • Asymptomatic bacteriuria   • Iron deficiency anemia   • Hypokalemia       Physical Therapy Education     Title: PT OT SLP Therapies (Resolved)     Topic: Physical Therapy (Resolved)     Point: Mobility training (Resolved)    Learning Progress Summary    Learner Readiness Method Response Comment Documented by Status   Patient Acceptance E,TB DU,VU  CW 04/10/17 1018 Done    Eager E [facility] 17 0958 Done    Acceptance E,TB DU,VU  CW 17 1139 Done    Acceptance E,TB,D NR,VU   17 1338 Done               Point: Body mechanics (Resolved)    Learning Progress Summary    Learner Readiness Method Response Comment Documented by Status   Patient Acceptance E,TB DU,VU  CW 04/10/17 1018 Done    Eager E [facility] 17 0958 Done    Acceptance E,TB DU,VU  CW 17 1139 Done    Acceptance E,TB,D NR,[facility] 17 1338 Done               Point: Precautions (Resolved)     Learning Progress Summary    Learner Readiness Method Response Comment Documented by Status   Patient Acceptance E,TB DU,VU   04/10/17 1018 Done    Eager E VU   04/09/17 0958 Done    Acceptance E,TB DU,VU  CW 04/07/17 1139 Done    Acceptance E,TB,D NR,VU   04/06/17 1338 Done                      User Key     Initials Effective Dates Name Provider Type Psychiatric hospital 12/01/15 -  Keren Gray, PT Physical Therapist PT     06/22/16 -  Ivan Velez, PT Physical Therapist PT     12/13/16 -  Colt Paniagua Physical Therapy Assistant PT                    IP PT Goals       04/11/17 1158 04/09/17 0958 04/06/17 1338    Bed Mobility PT LTG    Bed Mobility PT LTG, Time to Achieve  2 days  -DH 1 wk  -CH    Bed Mobility PT LTG, Activity Type  all bed mobility  -DH all bed mobility  -CH    Bed Mobility PT LTG, [place]  conditional independence  -DH independent  -CH    Bed Mobility PT LTG, Outcome goal met  -CW goal met  -DH     Transfer Training PT LTG    Transfer Training PT LTG, Time to Achieve  1 day  -DH 1 wk  -CH    Transfer Training PT LTG, Activity Type   all transfers  -CH    Transfer Training PT LTG, [place]  conditional independence  -DH independent  -CH    Transfer Training PT LTG, Outcome goal met  -CW      Gait Training PT LTG    Gait Training Goal PT LTG, Time to Achieve  1 day  -DH 1 wk  -CH    Gait Training Goal PT LTG, [place] Level  contact guard assist  -DH independent  -CH    Gait Training Goal PT LTG, Assist Device  walker, rolling  -DH     Gait Training Goal PT LTG, Distance to Achieve  400 ft with rwx  -  -CH    Gait Training Goal PT LTG, Outcome goal met  -CW goal partially met  -DH       User Key  (r) = Recorded By, (t) = Taken By, (c) = Cosigned By    Initials Name Provider Type     Keren Gray, PT Physical Therapist     Ivan Velez, PT Physical Therapist     Colt Paniagua Physical Therapy Assistant              Adult  Rehabilitation Note       04/10/17 1000 04/09/17 0900       Rehab Assessment/Intervention    Discipline physical therapy assistant  -CW physical therapist  -     Document Type therapy note (daily note)  -CW progress note  -     Subjective Information agree to therapy;complains of;fatigue  -CW no complaints  -DH     Patient Effort, Rehab Treatment good  -CW good  -DH     Precautions/Limitations fall precautions  - fall precautions  -DH     Recorded by [CW] Colt Paniagua [] Ivan Velez, PT     Vital Signs    O2 Delivery Pre Treatment room air  -CW      Recorded by [CW] Colt Paniagua      Pain Assessment    Pain Assessment No/denies pain  -CW No/denies pain  -DH     Recorded by [CW] Colt Paniagua [] Ivan Velez, PT     Cognitive Assessment/Intervention    Current Cognitive/Communication Assessment functional  -CW functional  -     Orientation Status oriented x 4  -CW oriented x 4  -     Follows Commands/Answers Questions 100% of the time  -% of the time  -     Personal Safety WNL/WFL  -CW WNL/WFL  -     Personal Safety Interventions fall prevention program maintained;gait belt;nonskid shoes/slippers when out of bed  - fall prevention program maintained  -     Recorded by [CW] Colt Paniagua [] Ivan Velez, PT     Bed Mobility, Assessment/Treatment    Bed Mob, Supine to Sit, [place] independent  - independent  -     Bed Mob, Sit to Supine, [place] independent  - independent  -     Recorded by [CW] Colt Paniagua [] Ivan Velez, PT     Transfer Assessment/Treatment    Transfers, Bed-Chair [place]  conditional independence  -     Transfers, Chair-Bed [place]  conditional independence  -     Transfers, Bed-Chair-Bed, Assist Device  rolling walker  -     Transfers, Sit-Stand [place] conditional independence  - conditional independence  -     Transfers, Stand-Sit [place] conditional independence  -  conditional independence  -DH     Transfers, Sit-Stand-Sit, Assist Device rolling walker  -CW rolling walker  -DH     Recorded by [CW] Colt Paniagua [] Ivan Velez PT     Gait Assessment/Treatment    Gait, [place] Level stand by assist  -CW stand by assist  -     Gait, Assistive Device rolling walker  -CW rolling walker  -DH     Gait, Distance (Feet) 300  -CW --   verbal cuing with hip flexion during swing through  -     Gait, Gait Deviations cecilia decreased;step length decreased;stride length decreased  -CW      Recorded by [CW] Colt Paniagua [] Ivan Velez PT     Positioning and Restraints    Pre-Treatment Position in bed  - sitting in chair/recliner  -     Post Treatment Position chair  -CW chair  -DH     In Chair notified nsg;reclined;call light within reach;encouraged to call for assist  -CW      Bathroom  sitting;call light within reach;encouraged to call for assist  -     Recorded by [CW] Colt Paniagua [] Ivan Velez PT       User Key  (r) = Recorded By, (t) = Taken By, (c) = Cosigned By    Initials Name Effective Dates     Ivna Velez, PT 06/22/16 -     CW Colt Paniagua 12/13/16 -           PT Recommendation and Plan  Anticipated Discharge Disposition: home with home health  Planned Therapy Interventions: balance training, bed mobility training, gait training, home exercise program, patient/family education, transfer training  PT Frequency: daily  Plan of Care Review  Plan Of Care Reviewed With: patient  Progress: progress toward functional goals as expected  Outcome Summary/Follow up Plan: Pt unsteady with HHA used RWX today improved with gait pattern          Outcome Measures       04/10/17 1000 04/09/17 1000       How much help from another person do you currently need...    Turning from your back to your side while in flat bed without using bedrails? 4  -CW 4  -DH     Moving from lying on back to sitting on the side of a flat bed without  bedrails? 4  -CW 4  -DH     Moving to and from a bed to a chair (including a wheelchair)? 4  -CW 4  -DH     Standing up from a chair using your arms (e.g., wheelchair, bedside chair)? 4  -CW 4  -DH     Climbing 3-5 steps with a railing? 3  -CW 3  -DH     To walk in hospital room? 3  -CW 3  -DH     AM-PAC 6 Clicks Score 22  -CW 22  -DH     Functional Assessment    Outcome Measure Options AM-PAC 6 Clicks Basic Mobility (PT)  -CW AM-PAC 6 Clicks Basic Mobility (PT)  -DH       User Key  (r) = Recorded By, (t) = Taken By, (c) = Cosigned By    Initials Name Provider Type    AVI Velez, PT Physical Therapist    CW Colt Paniagua Physical Therapy Assistant           Time Calculation:       Therapy Charges for Today     Code Description Service Date Service Provider Modifiers Qty    41196915137 HC PT THER PROC EA 15 MIN 4/10/2017 Colt Paniagua GP 1    19665184416 HC PT THER SUPP EA 15 MIN 4/10/2017 Colt Paniagua GP 1          PT G-Codes  Outcome Measure Options: AM-PAC 6 Clicks Basic Mobility (PT)    PT Discharge Summary  Reason for Discharge: All goals achieved  Outcomes Achieved: Refer to plan of care for updates on goals achieved  Discharge Destination: Home with assist    Colt Paniagua  4/11/2017

## 2017-04-12 ENCOUNTER — TELEPHONE (OUTPATIENT)
Dept: INFECTIOUS DISEASES | Facility: CLINIC | Age: 82
End: 2017-04-12

## 2017-04-12 NOTE — TELEPHONE ENCOUNTER
Informed patient of her appointment with Vikas Fabian on 06/06/17 with Dr. Lilly @ 1:15.  Patient states that she will not be able to make that appointment as she will be out of town.  Patient requested that I contact [facility] to check their availability for an appointment.  I informed the patient that it may be next week before I am able to find out any information and call her back.

## 2017-04-13 ENCOUNTER — OFFICE VISIT (OUTPATIENT)
Dept: CARDIOLOGY | Facility: CLINIC | Age: 82
End: 2017-04-13

## 2017-04-13 VITALS
SYSTOLIC BLOOD PRESSURE: 104 MMHG | DIASTOLIC BLOOD PRESSURE: 60 MMHG | HEIGHT: 63 IN | WEIGHT: 110.2 LBS | RESPIRATION RATE: 16 BRPM | BODY MASS INDEX: 19.53 KG/M2 | HEART RATE: 73 BPM

## 2017-04-13 DIAGNOSIS — I10 BENIGN ESSENTIAL HYPERTENSION: ICD-10-CM

## 2017-04-13 DIAGNOSIS — I25.10 CHRONIC CORONARY ARTERY DISEASE: ICD-10-CM

## 2017-04-13 DIAGNOSIS — I50.32 CHRONIC DIASTOLIC CHF (CONGESTIVE HEART FAILURE) (HCC): ICD-10-CM

## 2017-04-13 DIAGNOSIS — I48.20 CHRONIC ATRIAL FIBRILLATION (HCC): Primary | Chronic | ICD-10-CM

## 2017-04-13 DIAGNOSIS — E78.2 MIXED HYPERLIPIDEMIA: ICD-10-CM

## 2017-04-13 DIAGNOSIS — I49.3 VENTRICULAR PREMATURE BEATS: ICD-10-CM

## 2017-04-13 DIAGNOSIS — A04.72 C. DIFFICILE COLITIS: ICD-10-CM

## 2017-04-13 PROCEDURE — 93000 ELECTROCARDIOGRAM COMPLETE: CPT | Performed by: INTERNAL MEDICINE

## 2017-04-13 PROCEDURE — 99213 OFFICE O/P EST LOW 20 MIN: CPT | Performed by: INTERNAL MEDICINE

## 2017-04-13 NOTE — PROGRESS NOTES
PATIENTINFORMATION    Date of Office Visit: 2017  Encounter Provider: Marcie Robbins MD  Place of Service: [facility] CARDIOLOGY  Patient Name: Agnieszka Rizzo  : 1930    Subjective:     Encounter Date:2017      Patient ID: Agnieszka Rizzo is a 86 y.o. female.      History of Present Illness    This is a lady I met while she was hospitalized in 2016. She has a significant pulmonary history and was having a bronchoscopy and unfortunately developed a pneumothorax. She was found to be in rate -controlled atrial fibrillation. She had previously been seen by Dr. Ana Goodrich for history of hypertension, hyperlipidemia, mild mitral valve prolapse and nonobstructive coronary artery disease diagnosed by heart catheterization in .      Echocardiogram November 15, 2016:  · All left ventricular wall segments contract normally.  · Left ventricular function is normal. Estimated EF = 58%.  · Left atrial cavity size is moderately dilated.  · Mild tricuspid valve regurgitation is present.  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 They felt she would do well on oral anticoagulation and I started her on Pradaxa. I did not do a stress test while she was hospitalized because she was wheezing and I did not fill comfortable giving her Lexiscan at that time and I did not when he is dobutamine because of her atrial fibrillation.       She was able to have a stress as an outpt on 16 and this was normal. She continued to complain of dyspnea on exertion and so I had her set up for a cardioversion. She had a couple of hospitalizations in the meantime for respiratory issues. While she was in the hospital in 2017, I attempted to cardiovert her. I shocked her 3 times that she did not remain in sinus rhythm. I spoke with her family and we  decided to continue with rate control and anticoagulation.     She was hospitalized in 02/2017. She had started Voriconazole for treatment of pulmonary aspergillosis by Dr. Tucker. She became very nauseous and sick, and threw up for an entire day. She came into the hospital confused and weak. Her sodium was at 109. She was found to have a left clavicle fracture from a fall. She was discharged to [facility], where she has been.  Unfortunately, as a result of the treatment for the aspergillosis, she developed C. difficile and was rehospitalized for treatment of this in March 2017.  As a part of his treatment she did receive IV fluids.  She was discharged but then I readmitted her on March 22 for IV diuresis.  She was volume overloaded and in diastolic heart failure.  She was back in the hospital on April 2 with more C. difficile colitis.  Again she was treated with IV fluids and became volume overloaded.  I diuresed her in the hospital and she was only mildly volume overloaded at discharge.    She comes in today just a few days after discharge.  She has had good diuresis.  Her legs still have some mild edema but her abdomen is more flat.  Her breathing feels good.  She is eating.  Generally she feels great.  She is trying to get a fecal transplant set up in [place] within the next couple of weeks.    Review of Systems   Constitution: Negative for fever, malaise/fatigue, weight gain and weight loss.   HENT: Negative for ear pain, hearing loss, nosebleeds and sore throat.    Eyes: Negative for double vision, pain, vision loss in left eye and vision loss in right eye.   Cardiovascular: Positive for leg swelling.        See history of present illness.   Respiratory: Negative for cough, shortness of breath, sleep disturbances due to breathing, snoring and wheezing.    Endocrine: Negative for cold intolerance, heat intolerance and polyuria.   Skin: Negative for itching, poor wound healing and rash.   Musculoskeletal:  "Negative for joint pain, joint swelling and myalgias.   Gastrointestinal: Negative for abdominal pain, diarrhea, hematochezia, nausea and vomiting.   Genitourinary: Negative for hematuria and hesitancy.   Neurological: Negative for numbness, paresthesias and seizures.   Psychiatric/Behavioral: Negative for depression. The patient is not nervous/anxious.            ECG 12 Lead  Date/Time: 4/13/2017 12:43 PM  Performed by: ANGELI STODDARD  Authorized by: ANGELI STODDARD   Comparison: compared with previous ECG from 4/2/2017  Comparison to previous ECG: Heart rate is slower  Rhythm: atrial fibrillation  BPM: 73  Conduction: conduction normal  ST Segments: ST segments normal  T Waves: T waves normal  Clinical impression: abnormal ECG               Objective:     /60 (BP Location: Right arm, Patient Position: Sitting, Cuff Size: Adult)  Pulse 73  Resp 16  Ht 63\" (160 cm)  Wt 110 lb 3.2 oz (50 kg)  BMI 19.52 kg/m2 Body mass index is 19.52 kg/(m^2).     Physical Exam   Constitutional: She appears well-developed.   HENT:   Head: Normocephalic and atraumatic.   Eyes: Conjunctivae and lids are normal. Pupils are equal, round, and reactive to light. Lids are everted and swept, no foreign bodies found.   Neck: Normal range of motion. No JVD present. Carotid bruit is not present. No tracheal deviation present. No thyroid mass present.   Cardiovascular: Normal rate and normal heart sounds.  An irregularly irregular rhythm present.   Pulses:       Dorsalis pedis pulses are 2+ on the right side, and 2+ on the left side.   Pulmonary/Chest: Effort normal and breath sounds normal.   Abdominal: Normal appearance and bowel sounds are normal.   Musculoskeletal: Normal range of motion.   Neurological: She is alert. She has normal strength.   Skin: Skin is warm, dry and intact.   Psychiatric: She has a normal mood and affect. Her behavior is normal.   Vitals reviewed.          Assessment/Plan:       1. Atrial fibrillation. She " failed cardioversion in January 2017. She has a CHADS2-Vasc score of 5. She is anticoagulated with Pradaxa. She is generally rate controlled. She is not a candidate for beta blockers because of lung disease.  2. Chronic diastolic heart failure.  Continue torsemide.  I told her when her legs are back to normal and her abdomen as well, she can cut the torsemide in half and only is a half tablet daily knowing that she can take a full tablet if she starts to see signs of volume retention  3. Dyspnea on exertion. Multifactorial  4. Mitral valve prolapse with very mild mitral regurgitation.  5. Mild tricuspid regurgitation without pulmonary hypertension.  6. Nonobstructive coronary artery disease diagnosed by catheter in 2007. She has noted coronary artery calcification on CT scans. Nuclear stress 12/16 was normal.   7. Hypertension. Her blood pressure is controlled.  As a matter fact, I may need to come down on the losartan.  8. Hyperlipidemia. Zetia and atorvastatin  9. Hyponatremia. Stable.  10. C. difficile diarrhea.  Status post into bad experience.  Plan is for a fecal transplant in [place].  11. Anemia  12. Bronchiectasis and Aspergillus colonization.  No further treatment for the aspergillosis.    See her back in about 3 weeks to follow-up on her volume status.    Orders Placed This Encounter   Procedures   • ECG 12 Lead     This order was created via procedure documentation      Agnieszka Rizzo   [place] Medication Instructions SILVIA:    Printed on:04/13/17 5457   Medication Information                      acetylcysteine (MUCOMYST) 20 % nebulizer solution  Take 4 mL by nebulization 4 (Four) Times a Day.             ADVAIR -21 MCG/ACT inhaler  Inhale 2 puffs 2 (Two) Times a Day.             atorvastatin (LIPITOR) 40 MG tablet  Take 1 tablet by mouth Daily.             B Complex-C (SUPER B COMPLEX PO)  Take  by mouth.             cholecalciferol (VITAMIN D3) 1000 UNITS tablet  Take 1,000 Units by mouth  Daily.             dabigatran etexilate (PRADAXA) 150 MG capsu  Take 1 capsule by mouth Every 12 (Twelve) Hours.             diltiaZEM CD (CARDIZEM CD) 360 MG 24 hr capsule  TAKE ONE CAPSULE BY MOUTH DAILY             ezetimibe (ZETIA) 10 MG tablet  Take 1 tablet by mouth Daily.             ferrous sulfate 325 (65 FE) MG tablet  Take 1 tablet by mouth 3 (Three) Times a Day With Meals for 30 days.             fidaxomicin (DIFICID) 200 MG tablet  Take 1 tablet by mouth Every 12 (Twelve) Hours for 2 days. Indications: Clostridium difficile Bacteria             losartan (COZAAR) 50 MG tablet  50 mg 2 (Two) Times a Day.             Multiple Vitamin (MULTIVITAMIN) tablet  Take 1 tablet by mouth Daily.             pantoprazole (PROTONIX) 40 MG EC tablet  Take 1 tablet by mouth Daily.             torsemide (DEMADEX) 20 MG tablet  Take 2 tablets by mouth Daily for 30 days.             VERAMYST 27.5 MCG/SPRAY nasal spray  1 spray into each nostril Daily.                        Marcie Robbins MD  04/13/17  12:48 PM

## 2017-04-17 ENCOUNTER — TELEPHONE (OUTPATIENT)
Dept: CARDIOLOGY | Facility: CLINIC | Age: 82
End: 2017-04-17

## 2017-04-17 ENCOUNTER — OFFICE VISIT (OUTPATIENT)
Dept: INTERNAL MEDICINE | Facility: CLINIC | Age: 82
End: 2017-04-17

## 2017-04-17 VITALS
BODY MASS INDEX: 20.54 KG/M2 | DIASTOLIC BLOOD PRESSURE: 40 MMHG | HEART RATE: 71 BPM | TEMPERATURE: 98 F | OXYGEN SATURATION: 98 % | SYSTOLIC BLOOD PRESSURE: 102 MMHG | HEIGHT: 63 IN | WEIGHT: 115.9 LBS

## 2017-04-17 DIAGNOSIS — J47.9 BRONCHIECTASIS WITHOUT COMPLICATION (HCC): ICD-10-CM

## 2017-04-17 DIAGNOSIS — I10 BENIGN ESSENTIAL HYPERTENSION: Primary | ICD-10-CM

## 2017-04-17 DIAGNOSIS — E87.6 HYPOKALEMIA: ICD-10-CM

## 2017-04-17 DIAGNOSIS — D50.9 IRON DEFICIENCY ANEMIA, UNSPECIFIED IRON DEFICIENCY ANEMIA TYPE: ICD-10-CM

## 2017-04-17 DIAGNOSIS — E87.1 HYPONATREMIA: ICD-10-CM

## 2017-04-17 DIAGNOSIS — A04.72 C. DIFFICILE COLITIS: ICD-10-CM

## 2017-04-17 PROCEDURE — 99214 OFFICE O/P EST MOD 30 MIN: CPT | Performed by: FAMILY MEDICINE

## 2017-04-17 RX ORDER — GABAPENTIN 100 MG/1
100 CAPSULE ORAL
COMMUNITY
Start: 2017-03-01 | End: 2017-04-17

## 2017-04-17 NOTE — PROGRESS NOTES
Subjective   Agnieszka Rizzo is a 86 y.o. female.     Chief Complaint   Patient presents with   • New Patient visit   • [facility], [place], operated by [facility] for c-dif     third hospitalization for this         History of Present Illness   Has Recent hospitalization from April 2 to April 11 for C. difficile electrolyte abnormality iron deficiency anemia sepsis followed by ID and rheumatology and cardiology improved during hospitalization and treatment.  She had had her third episode of C. difficile with symptoms and thought to potentially need fecal transplant and she is in the office none plans are being coronary by infectious disease she feels very well at this point decrease the frequency no abdominal pain heart is under good rate control and no exacerbations her breathing is stable also as her bronchiectasis is well-controlled she has no fever.  She's developed anemia over this process in the last year she says she's had a recent colonoscopy and EGD she's taken iron 3 times a day  The following portions of the patient's history were reviewed and updated as appropriate: allergies, current medications, past family history, past medical history, past social history, past surgical history and problem list.    Review of Systems   Constitutional: Negative.    HENT: Negative.    Eyes: Negative.    Respiratory: Negative.    Cardiovascular: Negative.    Gastrointestinal: Positive for diarrhea. Negative for abdominal pain and rectal pain.   Endocrine: Negative.    Genitourinary: Negative.    Musculoskeletal: Negative.    Neurological: Negative.    Hematological: Negative.    Psychiatric/Behavioral: Negative.        Objective   Physical Exam   Constitutional: She is oriented to person, place, and time. She appears well-developed.   HENT:   Head: Normocephalic and atraumatic.   Eyes: Conjunctivae and EOM are normal. Pupils are equal, round, and reactive to light.   Neck: No thyromegaly present.   Cardiovascular: Normal rate.     Pulmonary/Chest: Effort normal and breath sounds normal.   Abdominal: Soft. Bowel sounds are normal.   Musculoskeletal: Normal range of motion. She exhibits no edema.   Lymphadenopathy:     She has no cervical adenopathy.   Neurological: She is alert and oriented to person, place, and time.   Skin: Skin is warm.   Psychiatric: She has a normal mood and affect. Her behavior is normal. Judgment and thought content normal.   Nursing note and vitals reviewed.      Assessment/Plan   Agnieszka was seen today for new patient visit and [facility] for c-dif.    Diagnoses and all orders for this visit:    Benign essential hypertension    Bronchiectasis without complication    C. difficile colitis    Hyponatremia    Hypokalemia    Iron deficiency anemia, unspecified iron deficiency anemia type      Stop Protonix and calcium continue Iron.  Follow up as needed otherwise we'll recheck CBC and BMP in 3 months

## 2017-04-17 NOTE — TELEPHONE ENCOUNTER
Diltiazem is generic.  She does not need to be on any of the non-generic forms of it.  No there is nothing I'm going to switch her to.

## 2017-04-17 NOTE — TELEPHONE ENCOUNTER
Aman states that the medication Dilitiazem is a non-formulary medication. Do you want to switch it to something else, can she take the generic? Please advise so I can send in appeal form

## 2017-04-18 ENCOUNTER — TELEPHONE (OUTPATIENT)
Dept: INFECTIOUS DISEASES | Facility: CLINIC | Age: 82
End: 2017-04-18

## 2017-04-18 NOTE — TELEPHONE ENCOUNTER
Informed patient that [facility] is approx 2 weeks out for an appointment for fecal transplant.  I also informed her that [facility] is planning on beginning the fecal transplant program on May 8.  I explained to the patient that if she wanted to wait for an appointment at [facility], that I would try to get her scheduled.  I also informed her that if for some reason the program did start at that time, that I would let her know and could forward all of her information to [facility].  Patient states that she would like to pursue the procedure at [facility] and then have [facility] as a backup.

## 2017-04-19 ENCOUNTER — TELEPHONE (OUTPATIENT)
Dept: INTERNAL MEDICINE | Facility: CLINIC | Age: 82
End: 2017-04-19

## 2017-04-19 DIAGNOSIS — R53.1 STRENGTH LOSS OF: Primary | ICD-10-CM

## 2017-04-19 NOTE — TELEPHONE ENCOUNTER
Patient called asking if she could have a referral to [facility] to help gain her strength back since she had c-dif.  Please advise.

## 2017-04-20 ENCOUNTER — TELEPHONE (OUTPATIENT)
Dept: INFECTIOUS DISEASES | Facility: CLINIC | Age: 82
End: 2017-04-20

## 2017-04-20 NOTE — TELEPHONE ENCOUNTER
Informed patient that Dr. Landry would like for her to go to [facility] for fecal transplant instead of waiting for the program to begin at [facility].  Patient agreed.  I will fax all of her information to [facility].

## 2017-04-24 ENCOUNTER — TELEPHONE (OUTPATIENT)
Dept: INFECTIOUS DISEASES | Facility: CLINIC | Age: 82
End: 2017-04-24

## 2017-04-24 NOTE — TELEPHONE ENCOUNTER
I touched base with patient regarding her appointment with [facility].  Patient states that it is scheduled for 05/18 @  3:00.  Patient states that she is doing well at this time and will call me after she see's Dr. Greg James.

## 2017-05-02 ENCOUNTER — TELEPHONE (OUTPATIENT)
Dept: CARDIOLOGY | Facility: CLINIC | Age: 82
End: 2017-05-02

## 2017-05-02 RX ORDER — TORSEMIDE 20 MG/1
40 TABLET ORAL DAILY
Qty: 180 TABLET | Refills: 3 | Status: SHIPPED | OUTPATIENT
Start: 2017-05-02 | End: 2017-05-02 | Stop reason: SDUPTHER

## 2017-05-02 RX ORDER — VERAPAMIL HYDROCHLORIDE 240 MG/1
240 TABLET, FILM COATED, EXTENDED RELEASE ORAL DAILY
Qty: 90 TABLET | Refills: 3 | Status: SHIPPED | OUTPATIENT
Start: 2017-05-02 | End: 2017-05-26

## 2017-05-02 RX ORDER — TORSEMIDE 20 MG/1
40 TABLET ORAL DAILY
Qty: 60 TABLET | Refills: 0 | Status: ON HOLD | OUTPATIENT
Start: 2017-05-02 | End: 2017-06-09

## 2017-05-02 RX ORDER — LOSARTAN POTASSIUM 50 MG/1
50 TABLET ORAL 2 TIMES DAILY
Qty: 180 TABLET | Refills: 3 | Status: SHIPPED | OUTPATIENT
Start: 2017-05-02 | End: 2017-05-26

## 2017-05-11 ENCOUNTER — OFFICE VISIT (OUTPATIENT)
Dept: CARDIOLOGY | Facility: CLINIC | Age: 82
End: 2017-05-11

## 2017-05-11 VITALS
SYSTOLIC BLOOD PRESSURE: 112 MMHG | DIASTOLIC BLOOD PRESSURE: 60 MMHG | WEIGHT: 113.4 LBS | HEART RATE: 104 BPM | RESPIRATION RATE: 16 BRPM | BODY MASS INDEX: 20.87 KG/M2 | HEIGHT: 62 IN

## 2017-05-11 DIAGNOSIS — R13.10 ODYNOPHAGIA: ICD-10-CM

## 2017-05-11 DIAGNOSIS — I10 BENIGN ESSENTIAL HYPERTENSION: ICD-10-CM

## 2017-05-11 DIAGNOSIS — I50.32 CHRONIC DIASTOLIC CHF (CONGESTIVE HEART FAILURE) (HCC): ICD-10-CM

## 2017-05-11 DIAGNOSIS — I48.20 CHRONIC ATRIAL FIBRILLATION (HCC): Primary | Chronic | ICD-10-CM

## 2017-05-11 PROCEDURE — 99214 OFFICE O/P EST MOD 30 MIN: CPT | Performed by: INTERNAL MEDICINE

## 2017-05-11 PROCEDURE — 93000 ELECTROCARDIOGRAM COMPLETE: CPT | Performed by: INTERNAL MEDICINE

## 2017-05-11 RX ORDER — POTASSIUM CHLORIDE 750 MG/1
10 TABLET, EXTENDED RELEASE ORAL DAILY
COMMUNITY
End: 2018-01-16 | Stop reason: SDUPTHER

## 2017-05-19 ENCOUNTER — TELEPHONE (OUTPATIENT)
Dept: INFECTIOUS DISEASES | Facility: CLINIC | Age: 82
End: 2017-05-19

## 2017-05-22 ENCOUNTER — TELEPHONE (OUTPATIENT)
Dept: INTERNAL MEDICINE | Facility: CLINIC | Age: 82
End: 2017-05-22

## 2017-05-22 DIAGNOSIS — D64.9 ANEMIA, UNSPECIFIED TYPE: Primary | ICD-10-CM

## 2017-05-23 ENCOUNTER — TELEPHONE (OUTPATIENT)
Dept: CARDIOLOGY | Facility: CLINIC | Age: 82
End: 2017-05-23

## 2017-05-24 RX ORDER — FERROUS SULFATE 325(65) MG
325 TABLET ORAL
Qty: 90 TABLET | Refills: 1 | Status: SHIPPED | OUTPATIENT
Start: 2017-05-24 | End: 2017-09-16 | Stop reason: SDUPTHER

## 2017-05-26 ENCOUNTER — TELEPHONE (OUTPATIENT)
Dept: INTERNAL MEDICINE | Facility: CLINIC | Age: 82
End: 2017-05-26

## 2017-05-26 ENCOUNTER — TELEPHONE (OUTPATIENT)
Dept: CARDIOLOGY | Facility: CLINIC | Age: 82
End: 2017-05-26

## 2017-05-26 RX ORDER — VERAPAMIL HYDROCHLORIDE 360 MG/1
360 CAPSULE, DELAYED RELEASE PELLETS ORAL NIGHTLY
Qty: 30 CAPSULE | Refills: 11 | Status: SHIPPED | OUTPATIENT
Start: 2017-05-26 | End: 2017-08-25

## 2017-05-30 ENCOUNTER — HOSPITAL ENCOUNTER (OUTPATIENT)
Dept: CT IMAGING | Facility: HOSPITAL | Age: 82
Discharge: HOME | End: 2017-05-30
Admitting: NURSE PRACTITIONER

## 2017-05-30 ENCOUNTER — TRANSCRIBE ORDERS (OUTPATIENT)
Dept: ADMINISTRATIVE | Facility: HOSPITAL | Age: 82
End: 2017-05-30

## 2017-05-30 ENCOUNTER — TELEPHONE (OUTPATIENT)
Dept: INFECTIOUS DISEASES | Facility: CLINIC | Age: 82
End: 2017-05-30

## 2017-05-30 DIAGNOSIS — R50.9 FEVER, UNSPECIFIED FEVER CAUSE: ICD-10-CM

## 2017-05-30 DIAGNOSIS — A31.0 MAI (MYCOBACTERIUM AVIUM-INTRACELLULARE) (HCC): Primary | ICD-10-CM

## 2017-05-30 DIAGNOSIS — A31.0 MAI (MYCOBACTERIUM AVIUM-INTRACELLULARE) (HCC): ICD-10-CM

## 2017-05-31 ENCOUNTER — TELEPHONE (OUTPATIENT)
Dept: CARDIOLOGY | Facility: CLINIC | Age: 82
End: 2017-05-31

## 2017-05-31 ENCOUNTER — HOSPITAL ENCOUNTER (INPATIENT)
Facility: HOSPITAL | Age: 82
LOS: 9 days | Discharge: HOME | End: 2017-06-09
Attending: EMERGENCY MEDICINE | Admitting: INTERNAL MEDICINE

## 2017-05-31 ENCOUNTER — APPOINTMENT (OUTPATIENT)
Dept: GENERAL RADIOLOGY | Facility: HOSPITAL | Age: 82
End: 2017-05-31

## 2017-05-31 DIAGNOSIS — J44.1 CHRONIC OBSTRUCTIVE PULMONARY DISEASE WITH ACUTE EXACERBATION (HCC): ICD-10-CM

## 2017-05-31 DIAGNOSIS — J18.9 PNEUMONIA OF BOTH LOWER LOBES DUE TO INFECTIOUS ORGANISM: ICD-10-CM

## 2017-05-31 DIAGNOSIS — R09.02 HYPOXIA: ICD-10-CM

## 2017-05-31 DIAGNOSIS — J18.9 PNEUMONIA OF BOTH LUNGS DUE TO INFECTIOUS ORGANISM, UNSPECIFIED PART OF LUNG: Primary | ICD-10-CM

## 2017-05-31 DIAGNOSIS — E87.1 HYPONATREMIA: ICD-10-CM

## 2017-05-31 DIAGNOSIS — I48.20 CHRONIC ATRIAL FIBRILLATION (HCC): ICD-10-CM

## 2017-05-31 LAB
ALBUMIN SERPL-MCNC: 3.5 G/DL (ref 3.5–5.2)
ALBUMIN/GLOB SERPL: 0.9 G/DL
ALP SERPL-CCNC: 104 U/L (ref 39–117)
ALT SERPL W P-5'-P-CCNC: 55 U/L (ref 1–33)
ANION GAP SERPL CALCULATED.3IONS-SCNC: 16.4 MMOL/L
AST SERPL-CCNC: 51 U/L (ref 1–32)
BACTERIA UR QL AUTO: ABNORMAL /HPF
BILIRUB SERPL-MCNC: 0.7 MG/DL (ref 0.1–1.2)
BILIRUB UR QL STRIP: NEGATIVE
BUN BLD-MCNC: 13 MG/DL (ref 8–23)
BUN/CREAT SERPL: 19.1 (ref 7–25)
CALCIUM SPEC-SCNC: 8.9 MG/DL (ref 8.6–10.5)
CHLORIDE SERPL-SCNC: 89 MMOL/L (ref 98–107)
CLARITY UR: CLEAR
CO2 SERPL-SCNC: 25.6 MMOL/L (ref 22–29)
COLOR UR: YELLOW
CREAT BLD-MCNC: 0.68 MG/DL (ref 0.57–1)
D-LACTATE SERPL-SCNC: 1.4 MMOL/L (ref 0.5–2)
DEPRECATED RDW RBC AUTO: 58.4 FL (ref 37–54)
EOSINOPHIL # BLD MANUAL: 0.18 10*3/MM3 (ref 0–0.7)
EOSINOPHIL NFR BLD MANUAL: 1 % (ref 0.3–6.2)
ERYTHROCYTE [DISTWIDTH] IN BLOOD BY AUTOMATED COUNT: 16.8 % (ref 11.7–13)
GFR SERPL CREATININE-BSD FRML MDRD: 82 ML/MIN/1.73
GLOBULIN UR ELPH-MCNC: 3.7 GM/DL
GLUCOSE BLD-MCNC: 123 MG/DL (ref 65–99)
GLUCOSE UR STRIP-MCNC: NEGATIVE MG/DL
HCT VFR BLD AUTO: 25.6 % (ref 35.6–45.5)
HGB BLD-MCNC: 8.5 G/DL (ref 11.9–15.5)
HGB UR QL STRIP.AUTO: ABNORMAL
HYALINE CASTS UR QL AUTO: ABNORMAL /LPF
INR PPP: 1.96 (ref 0.9–1.1)
KETONES UR QL STRIP: NEGATIVE
LEUKOCYTE ESTERASE UR QL STRIP.AUTO: ABNORMAL
LYMPHOCYTES # BLD MANUAL: 0.9 10*3/MM3 (ref 0.9–4.8)
LYMPHOCYTES NFR BLD MANUAL: 5 % (ref 19.6–45.3)
LYMPHOCYTES NFR BLD MANUAL: 7 % (ref 5–12)
MCH RBC QN AUTO: 31.8 PG (ref 26.9–32)
MCHC RBC AUTO-ENTMCNC: 33.2 G/DL (ref 32.4–36.3)
MCV RBC AUTO: 95.9 FL (ref 80.5–98.2)
MONOCYTES # BLD AUTO: 1.26 10*3/MM3 (ref 0.2–1.2)
NEUTROPHILS # BLD AUTO: 15.64 10*3/MM3 (ref 1.9–8.1)
NEUTROPHILS NFR BLD MANUAL: 87 % (ref 42.7–76)
NITRITE UR QL STRIP: NEGATIVE
PH UR STRIP.AUTO: 5.5 [PH] (ref 5–8)
PLAT MORPH BLD: NORMAL
PLATELET # BLD AUTO: 362 10*3/MM3 (ref 140–500)
PMV BLD AUTO: 9.3 FL (ref 6–12)
POTASSIUM BLD-SCNC: 3.7 MMOL/L (ref 3.5–5.2)
PROCALCITONIN SERPL-MCNC: 0.1 NG/ML (ref 0.1–0.25)
PROT SERPL-MCNC: 7.2 G/DL (ref 6–8.5)
PROT UR QL STRIP: NEGATIVE
PROTHROMBIN TIME: 21.7 SECONDS (ref 11.7–14.2)
RBC # BLD AUTO: 2.67 10*6/MM3 (ref 3.9–5.2)
RBC # UR: ABNORMAL /HPF
RBC AGGLUT BLD QL: ABNORMAL
REF LAB TEST METHOD: ABNORMAL
SCAN SLIDE: NORMAL
SODIUM BLD-SCNC: 131 MMOL/L (ref 136–145)
SP GR UR STRIP: 1.01 (ref 1–1.03)
SQUAMOUS #/AREA URNS HPF: ABNORMAL /HPF
T4 FREE SERPL-MCNC: 1.41 NG/DL (ref 0.93–1.7)
TSH SERPL DL<=0.05 MIU/L-ACNC: 1.63 MIU/ML (ref 0.27–4.2)
UROBILINOGEN UR QL STRIP: ABNORMAL
WBC MORPH BLD: NORMAL
WBC NRBC COR # BLD: 17.98 10*3/MM3 (ref 4.5–10.7)
WBC UR QL AUTO: ABNORMAL /HPF

## 2017-05-31 PROCEDURE — 94640 AIRWAY INHALATION TREATMENT: CPT

## 2017-05-31 PROCEDURE — 93010 ELECTROCARDIOGRAM REPORT: CPT | Performed by: INTERNAL MEDICINE

## 2017-05-31 PROCEDURE — 85610 PROTHROMBIN TIME: CPT | Performed by: EMERGENCY MEDICINE

## 2017-05-31 PROCEDURE — 84145 PROCALCITONIN (PCT): CPT | Performed by: EMERGENCY MEDICINE

## 2017-05-31 PROCEDURE — 87449 NOS EACH ORGANISM AG IA: CPT | Performed by: INTERNAL MEDICINE

## 2017-05-31 PROCEDURE — 87040 BLOOD CULTURE FOR BACTERIA: CPT | Performed by: EMERGENCY MEDICINE

## 2017-05-31 PROCEDURE — 80053 COMPREHEN METABOLIC PANEL: CPT | Performed by: EMERGENCY MEDICINE

## 2017-05-31 PROCEDURE — 85025 COMPLETE CBC W/AUTO DIFF WBC: CPT | Performed by: EMERGENCY MEDICINE

## 2017-05-31 PROCEDURE — 87633 RESP VIRUS 12-25 TARGETS: CPT | Performed by: INTERNAL MEDICINE

## 2017-05-31 PROCEDURE — 93005 ELECTROCARDIOGRAM TRACING: CPT | Performed by: EMERGENCY MEDICINE

## 2017-05-31 PROCEDURE — 87081 CULTURE SCREEN ONLY: CPT | Performed by: INTERNAL MEDICINE

## 2017-05-31 PROCEDURE — 25010000002 CEFEPIME: Performed by: EMERGENCY MEDICINE

## 2017-05-31 PROCEDURE — 87899 AGENT NOS ASSAY W/OPTIC: CPT | Performed by: INTERNAL MEDICINE

## 2017-05-31 PROCEDURE — 84443 ASSAY THYROID STIM HORMONE: CPT | Performed by: EMERGENCY MEDICINE

## 2017-05-31 PROCEDURE — 25010000002 VANCOMYCIN: Performed by: EMERGENCY MEDICINE

## 2017-05-31 PROCEDURE — 87581 M.PNEUMON DNA AMP PROBE: CPT | Performed by: INTERNAL MEDICINE

## 2017-05-31 PROCEDURE — 81001 URINALYSIS AUTO W/SCOPE: CPT | Performed by: EMERGENCY MEDICINE

## 2017-05-31 PROCEDURE — 25010000002 METHYLPREDNISOLONE PER 125 MG: Performed by: EMERGENCY MEDICINE

## 2017-05-31 PROCEDURE — 99285 EMERGENCY DEPT VISIT HI MDM: CPT

## 2017-05-31 PROCEDURE — 87486 CHLMYD PNEUM DNA AMP PROBE: CPT | Performed by: INTERNAL MEDICINE

## 2017-05-31 PROCEDURE — 85007 BL SMEAR W/DIFF WBC COUNT: CPT | Performed by: EMERGENCY MEDICINE

## 2017-05-31 PROCEDURE — 94799 UNLISTED PULMONARY SVC/PX: CPT

## 2017-05-31 PROCEDURE — 83605 ASSAY OF LACTIC ACID: CPT | Performed by: EMERGENCY MEDICINE

## 2017-05-31 PROCEDURE — 84439 ASSAY OF FREE THYROXINE: CPT | Performed by: EMERGENCY MEDICINE

## 2017-05-31 PROCEDURE — 87798 DETECT AGENT NOS DNA AMP: CPT | Performed by: INTERNAL MEDICINE

## 2017-05-31 RX ORDER — SODIUM PHOSPHATE,MONO-DIBASIC 19G-7G/118
1 ENEMA (ML) RECTAL DAILY
Status: ON HOLD | COMMUNITY
Start: 2021-01-01

## 2017-05-31 RX ORDER — DABIGATRAN ETEXILATE 150 MG/1
150 CAPSULE ORAL EVERY 12 HOURS SCHEDULED
Status: DISCONTINUED | OUTPATIENT
Start: 2017-05-31 | End: 2017-06-09 | Stop reason: HOSPADM

## 2017-05-31 RX ORDER — ALBUTEROL SULFATE 2.5 MG/3ML
2.5 SOLUTION RESPIRATORY (INHALATION) ONCE
Status: COMPLETED | OUTPATIENT
Start: 2017-05-31 | End: 2017-05-31

## 2017-05-31 RX ORDER — MORPHINE SULFATE 2 MG/ML
1 INJECTION, SOLUTION INTRAMUSCULAR; INTRAVENOUS EVERY 4 HOURS PRN
Status: DISCONTINUED | OUTPATIENT
Start: 2017-05-31 | End: 2017-06-09 | Stop reason: HOSPADM

## 2017-05-31 RX ORDER — L.ACID,PARA/B.BIFIDUM/S.THERM 8B CELL
1 CAPSULE ORAL DAILY
Status: DISCONTINUED | OUTPATIENT
Start: 2017-05-31 | End: 2017-06-09 | Stop reason: HOSPADM

## 2017-05-31 RX ORDER — METHYLPREDNISOLONE SODIUM SUCCINATE 125 MG/2ML
125 INJECTION, POWDER, LYOPHILIZED, FOR SOLUTION INTRAMUSCULAR; INTRAVENOUS ONCE
Status: COMPLETED | OUTPATIENT
Start: 2017-05-31 | End: 2017-05-31

## 2017-05-31 RX ORDER — LOSARTAN POTASSIUM 50 MG/1
50 TABLET ORAL 2 TIMES DAILY
COMMUNITY
End: 2017-06-09 | Stop reason: HOSPADM

## 2017-05-31 RX ORDER — CETIRIZINE HYDROCHLORIDE 10 MG/1
10 TABLET ORAL DAILY
COMMUNITY
End: 2019-09-23 | Stop reason: ALTCHOICE

## 2017-05-31 RX ORDER — TRAMADOL HYDROCHLORIDE 50 MG/1
50 TABLET ORAL EVERY 6 HOURS PRN
Status: DISCONTINUED | OUTPATIENT
Start: 2017-05-31 | End: 2017-06-09 | Stop reason: HOSPADM

## 2017-05-31 RX ORDER — ACETAMINOPHEN 325 MG/1
650 TABLET ORAL EVERY 4 HOURS PRN
Status: DISCONTINUED | OUTPATIENT
Start: 2017-05-31 | End: 2017-06-09 | Stop reason: HOSPADM

## 2017-05-31 RX ORDER — NALOXONE HCL 0.4 MG/ML
0.4 VIAL (ML) INJECTION
Status: DISCONTINUED | OUTPATIENT
Start: 2017-05-31 | End: 2017-06-09 | Stop reason: HOSPADM

## 2017-05-31 RX ORDER — SENNA AND DOCUSATE SODIUM 50; 8.6 MG/1; MG/1
2 TABLET, FILM COATED ORAL 2 TIMES DAILY PRN
Status: DISCONTINUED | OUTPATIENT
Start: 2017-05-31 | End: 2017-06-09 | Stop reason: HOSPADM

## 2017-05-31 RX ORDER — CITALOPRAM 20 MG/1
20 TABLET ORAL DAILY
COMMUNITY
End: 2017-06-20 | Stop reason: SDUPTHER

## 2017-05-31 RX ORDER — SODIUM CHLORIDE 0.9 % (FLUSH) 0.9 %
1-10 SYRINGE (ML) INJECTION AS NEEDED
Status: DISCONTINUED | OUTPATIENT
Start: 2017-05-31 | End: 2017-06-09 | Stop reason: HOSPADM

## 2017-05-31 RX ORDER — FUROSEMIDE 40 MG/1
40 TABLET ORAL 2 TIMES DAILY
COMMUNITY
Start: 2016-05-11 | End: 2017-06-09 | Stop reason: HOSPADM

## 2017-05-31 RX ORDER — ACETAMINOPHEN 500 MG
1000 TABLET ORAL ONCE
Status: COMPLETED | OUTPATIENT
Start: 2017-05-31 | End: 2017-05-31

## 2017-05-31 RX ORDER — NITROGLYCERIN 0.4 MG/1
0.4 TABLET SUBLINGUAL
Status: DISCONTINUED | OUTPATIENT
Start: 2017-05-31 | End: 2017-06-09 | Stop reason: HOSPADM

## 2017-05-31 RX ORDER — PHENOL 1.4 %
600 AEROSOL, SPRAY (ML) MUCOUS MEMBRANE DAILY
Status: ON HOLD | COMMUNITY

## 2017-05-31 RX ADMIN — CEFEPIME 2 G: 2 INJECTION, POWDER, FOR SOLUTION INTRAVENOUS at 19:40

## 2017-05-31 RX ADMIN — ACETAMINOPHEN 1000 MG: 500 TABLET ORAL at 19:07

## 2017-05-31 RX ADMIN — ALBUTEROL SULFATE 2.5 MG: 2.5 SOLUTION RESPIRATORY (INHALATION) at 20:07

## 2017-05-31 RX ADMIN — METRONIDAZOLE 500 MG: 500 INJECTION, SOLUTION INTRAVENOUS at 23:50

## 2017-05-31 RX ADMIN — METHYLPREDNISOLONE SODIUM SUCCINATE 125 MG: 125 INJECTION, POWDER, FOR SOLUTION INTRAMUSCULAR; INTRAVENOUS at 19:10

## 2017-05-31 RX ADMIN — SODIUM CHLORIDE 1500 ML: 9 INJECTION, SOLUTION INTRAVENOUS at 22:16

## 2017-05-31 RX ADMIN — VANCOMYCIN HYDROCHLORIDE 1250 MG: 1 INJECTION, POWDER, LYOPHILIZED, FOR SOLUTION INTRAVENOUS at 19:05

## 2017-06-01 LAB
B PERT DNA SPEC QL NAA+PROBE: NOT DETECTED
C PNEUM DNA NPH QL NAA+NON-PROBE: NOT DETECTED
D-LACTATE SERPL-SCNC: 0.8 MMOL/L (ref 0.5–2)
FLUAV H1 2009 PAND RNA NPH QL NAA+PROBE: NOT DETECTED
FLUAV H1 HA GENE NPH QL NAA+PROBE: NOT DETECTED
FLUAV H3 RNA NPH QL NAA+PROBE: NOT DETECTED
FLUAV SUBTYP SPEC NAA+PROBE: NOT DETECTED
FLUBV RNA ISLT QL NAA+PROBE: NOT DETECTED
HADV DNA SPEC NAA+PROBE: NOT DETECTED
HCOV 229E RNA SPEC QL NAA+PROBE: NOT DETECTED
HCOV HKU1 RNA SPEC QL NAA+PROBE: NOT DETECTED
HCOV NL63 RNA SPEC QL NAA+PROBE: NOT DETECTED
HCOV OC43 RNA SPEC QL NAA+PROBE: NOT DETECTED
HMPV RNA NPH QL NAA+NON-PROBE: NOT DETECTED
HPIV1 RNA SPEC QL NAA+PROBE: NOT DETECTED
HPIV2 RNA SPEC QL NAA+PROBE: NOT DETECTED
HPIV3 RNA NPH QL NAA+PROBE: NOT DETECTED
HPIV4 P GENE NPH QL NAA+PROBE: NOT DETECTED
L PNEUMO1 AG UR QL IA: NEGATIVE
M PNEUMO IGG SER IA-ACNC: NOT DETECTED
NT-PROBNP SERPL-MCNC: 1707 PG/ML (ref 0–1800)
PROCALCITONIN SERPL-MCNC: 0.18 NG/ML (ref 0.1–0.25)
RHINOVIRUS RNA SPEC NAA+PROBE: NOT DETECTED
RSV RNA NPH QL NAA+NON-PROBE: NOT DETECTED
S PNEUM AG SPEC QL LA: NEGATIVE

## 2017-06-01 PROCEDURE — 99223 1ST HOSP IP/OBS HIGH 75: CPT | Performed by: INTERNAL MEDICINE

## 2017-06-01 PROCEDURE — 87070 CULTURE OTHR SPECIMN AEROBIC: CPT | Performed by: INTERNAL MEDICINE

## 2017-06-01 PROCEDURE — 83605 ASSAY OF LACTIC ACID: CPT | Performed by: INTERNAL MEDICINE

## 2017-06-01 PROCEDURE — 87205 SMEAR GRAM STAIN: CPT | Performed by: INTERNAL MEDICINE

## 2017-06-01 PROCEDURE — 36415 COLL VENOUS BLD VENIPUNCTURE: CPT | Performed by: INTERNAL MEDICINE

## 2017-06-01 PROCEDURE — 94640 AIRWAY INHALATION TREATMENT: CPT

## 2017-06-01 PROCEDURE — 94799 UNLISTED PULMONARY SVC/PX: CPT

## 2017-06-01 PROCEDURE — 84145 PROCALCITONIN (PCT): CPT | Performed by: INTERNAL MEDICINE

## 2017-06-01 PROCEDURE — 83880 ASSAY OF NATRIURETIC PEPTIDE: CPT | Performed by: INTERNAL MEDICINE

## 2017-06-01 PROCEDURE — 25010000002 CEFEPIME: Performed by: INTERNAL MEDICINE

## 2017-06-01 RX ADMIN — CEFEPIME 2 G: 2 INJECTION, POWDER, FOR SOLUTION INTRAVENOUS at 06:16

## 2017-06-01 RX ADMIN — CEFEPIME 2 G: 2 INJECTION, POWDER, FOR SOLUTION INTRAVENOUS at 18:49

## 2017-06-01 RX ADMIN — DABIGATRAN ETEXILATE MESYLATE 150 MG: 150 CAPSULE ORAL at 20:18

## 2017-06-01 RX ADMIN — IPRATROPIUM BROMIDE 0.5 MG: 0.5 SOLUTION RESPIRATORY (INHALATION) at 21:34

## 2017-06-01 RX ADMIN — METRONIDAZOLE 500 MG: 500 INJECTION, SOLUTION INTRAVENOUS at 06:16

## 2017-06-01 RX ADMIN — METOPROLOL TARTRATE 25 MG: 25 TABLET ORAL at 21:39

## 2017-06-01 NOTE — PLAN OF CARE
Problem: Patient Care Overview (Adult)  Goal: Plan of Care Review  Outcome: Ongoing (interventions implemented as appropriate)    06/01/17 1638   Coping/Psychosocial Response Interventions   Plan Of Care Reviewed With patient   Patient Care Overview   Progress improving   Outcome Evaluation   Outcome Summary/Follow up Plan NO ACUTE DISTRESS. VSS. BRONCH TOMORROW. NPO AT MIDNIGHT. CONSTENT IN THE CHART. 02 3 LPM PNC. MONITORED-AFIB.          Problem: Pneumonia (Adult)  Goal: Signs and Symptoms of Listed Potential Problems Will be Absent or Manageable (Pneumonia)  Outcome: Ongoing (interventions implemented as appropriate)    06/01/17 1638   Pneumonia   Problems Assessed (Pneumonia) all   Problems Present (Pneumonia) none         Problem: Fall Risk (Adult)  Goal: Absence of Falls  Outcome: Ongoing (interventions implemented as appropriate)    06/01/17 1638   Fall Risk (Adult)   Absence of Falls making progress toward outcome

## 2017-06-01 NOTE — PROGRESS NOTES
Discharge Planning Assessment  [facility]     Patient Name: Agnieszka Rizzo  MRN: [id number]  Today's Date: 6/1/2017    Admit Date: 5/31/2017          Discharge Needs Assessment       06/01/17 1240    Living Environment    Lives With alone    Living Arrangements house    Home Accessibility no concerns    Transportation Available car;family or friend will provide    Living Environment    Able to Return to Prior Living Arrangements yes    Discharge Needs Assessment    Concerns To Be Addressed no discharge needs identified;denies needs/concerns at this time    Readmission Within The Last 30 Days no previous admission in last 30 days    Equipment Currently Used at Home oxygen;nebulizer    Equipment Needed After Discharge none            Discharge Plan       06/01/17 1241    Case Management/Social Work Plan    Plan Home     Additional Comments Spoke with pt at bedside. Facesheet info verified. Role of CCP explained. Pt uses a nebulizer at home and O2 at  provided by [facility]. Pt has used [facility] in the past as well as [facility]. Pt has been to [facility] in the past for BRIGITTE. Pt plans home no needs at this time. CCP to follow.         Discharge Placement     No information found                Demographic Summary     None            Functional Status     None            Psychosocial     None            Abuse/Neglect     None            Legal     None            Substance Abuse     None            Patient Forms     None          Marcie Siddiqui RN

## 2017-06-01 NOTE — CONSULTS
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 History is obtained from the patient and the review of her medical records which I summarize/synthesize as follows: I have taken care of her in the hospital in the past for C diff. That disease is currently quiescent. I referred her to [facility] for stool transplant but they were hesitant to do it with her lung disease and at the time was back to having normal stools.     She says about 2 weeks ago she started having a \"hacking cough.\" She was also short of air made worse w/ walking. She has been seeing cardiology for LE edema and been uptitrated on diuretics and beta-blockers. She says the edema is improving. Two days ago she saw pulmonary in clinic for the cough and tells me she had a normal CXR but was sent for a CT scan. She was supposed to see them in clinic today but had to come in last night due to fever to 100F at home then 100.8 F in the ER. Her WBC in the ER was 17k. She has since received 1 dose of steroids. CT showed a multifocal infiltrate so shew as started on cefepime and Flagyl and ID was consulted.     At the time of my interview she says she is feeling better this morning. No further fevers. She is coughing during the exam. Of note, her procal was negative. She is needing 3L NC to keep sats normal.     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 LVQ - \"it just makes me sick\"  2. PCN (tolerates CTX and " cefepime) - rash > 60 years ago  3. Erythromycin     Vital Signs  Temp:  [97.4 °F (36.3 °C)-100.8 °F (38.2 °C)] 97.6 °F (36.4 °C)  Heart Rate:  [] 98  Resp:  [16-20] 16  BP: (118-139)/(67-90) 121/83    Physical Exam:  General: awake, alert, good historian, very nice  Head: Normocephalic, atraumatic  Eyes: R eye ptosis, PERRL, no scleral icterus  ENT: MMM, OP clear, no thrush.   Neck: Supple, no visible thyromegaly  Cardiovascular: NR, irreg irreg, no murmurs, rubs, or gallops; no LE edema  Respiratory: Lungs with rales in BLL; faint wheeze; normal WOB on 3L NC  GI: Abdomen is soft, mild TTP, mildly distended, normal bowel sounds in all four quadrants; no hepatosplenomegaly, no masses palpated  : no Long catheter present  Musculoskeletal: no joint abnormalities, normal musculature  Skin: No rashes, lesions, or embolic phenomenon  Neurological: Alert and oriented x 3, cranial nerves 2-12 grossly intact, motor strength 4/5 in all four extremities  Psychiatric: Normal mood and affect   Lymph: no pre-auricular, post-auricular, submandibular, cervical, supraclavicular LAD  Vasc: no cyanosis; PIV w/o erythema      Medications:    Current Facility-Administered Medications:   •  acetaminophen (TYLENOL) tablet 650 mg, 650 mg, Oral, Q4H PRN, Pravin Jackson MD  •  cefepime (MAXIPIME) 2 g/100 mL 0.9% NS (mbp), 2 g, Intravenous, Q12H, Pravin Jackson MD, 2 g at 06/01/17 0616  •  dabigatran etexilate (PRADAXA) capsule 150 mg, 150 mg, Oral, Q12H, Pravin Jackson MD  •  lactobacillus acidophilus (RISAQUAD) capsule 1 capsule, 1 capsule, Oral, Daily, Pravin Jackson MD  •  metroNIDAZOLE (FLAGYL) IVPB 500 mg, 500 mg, Intravenous, Q8H, Pravin Jackson MD, 500 mg at 06/01/17 0616  •  morphine injection 1 mg, 1 mg, Intravenous, Q4H PRN **AND** naloxone (NARCAN) injection 0.4 mg, 0.4 mg, Intravenous, Q5 Min PRN, Pravin Jackson MD  •  nitroglycerin (NITROSTAT) SL tablet 0.4 mg, 0.4 mg, Sublingual, Q5 Min PRN, Pravin Jackson,  MD  •  Pharmacy to Dose - Cefepime, , Does not apply, Continuous PRN, Pravin Jackson MD  •  sennosides-docusate sodium (SENOKOT-S) 8.6-50 MG tablet 2 tablet, 2 tablet, Oral, BID PRN, Pravin Jackson MD  •  sodium chloride 0.9 % flush 1-10 mL, 1-10 mL, Intravenous, PRN, Pravin Jackson MD  •  traMADol (ULTRAM) tablet 50 mg, 50 mg, Oral, Q6H PRN, Pravin Jackson MD    LABS:    Lab Results   Component Value Date    WBC 17.98 (H) 05/31/2017    HGB 8.5 (L) 05/31/2017    HCT 25.6 (L) 05/31/2017    MCV 95.9 05/31/2017     05/31/2017     Lab Results   Component Value Date    GLUCOSE 123 (H) 05/31/2017    BUN 13 05/31/2017    CREATININE 0.68 05/31/2017    EGFRIFNONA 82 05/31/2017    BCR 19.1 05/31/2017    CO2 25.6 05/31/2017    CALCIUM 8.9 05/31/2017    ALBUMIN 3.50 05/31/2017    LABIL2 0.9 05/31/2017    AST 51 (H) 05/31/2017    ALT 55 (H) 05/31/2017     Procal 0.1  5/31 Strep pneumo: negative    Microbiology:  5/31 BCx: NGTD  5/31 RVP; negative  5/31 Sputum Cx: pending    Radiology (personally reviewed):   I reviewed CT chest and it does show bilateral multifocal infiltrates; compared to prior CT these nodules are increased in size    Assessment/Plan   1. 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 Bilateral pneumonia  3. Recurrent Clostridium difficile diarrhea  4. Chronic diastolic heart failure    Given fever, cough, worsening shortness of air, I agree with starting the patient on empiric cefepime for presumed bacterial pneumonia. 2 g q12h is appropriate. However, her procalcitonin is surprisingly low. I'll repeat it tomorrow. I previously elected not treat her MAC or the Aspergillus colonization because her respiratory symptoms were mild and given recurrent C diff the treatment seemed that it would be worse than the disease. I recommend stopping the Flagyl. I doubt she has an anaerobic organism causing pneumonia not covered by cefepime and it is not indicated for C diff  "\"prophylaxis.\" She should continue her probiotics. I would also recommend consideration of bronchoscopy. It could obtain for us a pathogen and also give us an update about the MAC and Aspergillus in the lungs. Finally, she has battled chronic diastolic HF lately and chronic Afib. She has been on increasing diuretics for this. The edema is improved but will check a BNP. Follow-up blood and sputum cultures.      Thank you for this consult. ID will follow. I have discussed her case recently with Dr Tucker.   "

## 2017-06-01 NOTE — PROGRESS NOTES
"                                              LOS: 1 day   Patient Care Team:  Matt Rose MD as PCP - General (Family Medicine)  Marcie Robbins MD as Consulting Physician (Cardiology)  Nilesh Danielle MD as Consulting Physician (Urology)    Chief Complaint:   Follow up on pneumonia, bronchiectasis, history of Mac and aspergillus    Interval History:   Jason is feeling much better.  Breathing has improved significantly.  She continues to have cough with greenish/dana phlegm.     REVIEW OF SYSTEMS:   CARDIOVASCULAR: No chest pain, chest pressure or chest discomfort. No palpitations or edema.   RESPIRATORY: see above  GASTROINTESTINAL: No anorexia, nausea, vomiting or diarrhea. No abdominal pain or blood.   HEMATOLOGIC: No bleeding or bruising.     Ventilator/Non-Invasive Ventilation Settings     None            Vital Signs  Temp:  [97.4 °F (36.3 °C)-100.8 °F (38.2 °C)] 97.8 °F (36.6 °C)  Heart Rate:  [] 99  Resp:  [16-20] 16  BP: (118-139)/(67-90) 128/84    Intake/Output Summary (Last 24 hours) at 06/01/17 1510  Last data filed at 06/01/17 1415   Gross per 24 hour   Intake              320 ml   Output              300 ml   Net               20 ml     Flowsheet Rows         First Filed Value    Admission Height  61\" (154.9 cm) Documented at 05/31/2017 1745    Admission Weight  117 lb (53.1 kg) Documented at 05/31/2017 1745          Physical Exam:   General Appearance:    Alert, cooperative, in no acute distress   Lungs:     Bilateral coarse inspiratory and  expiratory crackles.  No wheezing    Heart:    Slightly tachycardic.  No murmur    Chest Wall:    No abnormalities observed   Abdomen:    Soft.  Positive bowel sounds.  No tenderness    Neuro:   Conscious, alert, oriented x3   Extremities:   Moves all extremities well, no edema, no cyanosis, no             Redness          Results Review:          Results from last 7 days  Lab Units 05/31/17  1842   SODIUM mmol/L 131*   POTASSIUM mmol/L 3.7 "   CHLORIDE mmol/L 89*   TOTAL CO2 mmol/L 25.6   BUN mg/dL 13   CREATININE mg/dL 0.68   GLUCOSE mg/dL 123*   CALCIUM mg/dL 8.9           Results from last 7 days  Lab Units 05/31/17  1842   WBC 10*3/mm3 17.98*   HEMOGLOBIN g/dL 8.5*   HEMATOCRIT % 25.6*   PLATELETS 10*3/mm3 362       Results from last 7 days  Lab Units 05/31/17  1842   INR  1.96*         @LABRCNT(bnp)@  I reviewed the patient's new clinical results.  I personally viewed and interpreted the patient's CXR        Medication Review:     cefepime 2 g Intravenous Q12H   dabigatran etexilate 150 mg Oral Q12H   lactobacillus acidophilus 1 capsule Oral Daily            Diagnostic imaging:  I personally and independently reviewed theFollowing images:    Mild bronchiectasis, more prominent prominent in the right middle lobe.  Bilateral consolidation in the left upper lobe, right middle lobe and right lower lobe                                                           Assessment:    1) Bilateral community acquired pneumonia  2) sepsis, 2ary to #1, improved  3) Chronic hypoxic resp failure  4) COPD, stable  5) Bronchiectasis with pulmonary MAC and aspergillus   6) Recurrent Clostridium difficile diarrhea  7) Hyponatremia, mild: Likely related to underlying lung disease    Plan:  - Antibiotics with cefepime, day 2 per ID.  Flagyl discontinued  - Continue IV fluid  - Need BAL to for culture to r/o bacterial vs opportunistic infection. Discussed bronchoscopy with BAL with the patient.  I explained the alternatives, risks and advantages.  Patient is agreeable to proceed.  This is scheduled for tomorrow        Sung King MD  06/01/17  3:10 PM            EMR Dragon/Transcription disclaimer:   Much of this encounter note is an electronic transcription/translation of spoken language to printed text. The electronic translation of spoken language may permit erroneous, or at times, nonsensical words or phrases to be inadvertently transcribed; Although I have reviewed  the note for such errors, some may still exist.

## 2017-06-01 NOTE — PLAN OF CARE
Problem: Patient Care Overview (Adult)  Goal: Plan of Care Review  Outcome: Ongoing (interventions implemented as appropriate)    06/01/17 0257   Coping/Psychosocial Response Interventions   Plan Of Care Reviewed With patient   Patient Care Overview   Progress improving   Outcome Evaluation   Outcome Summary/Follow up Plan Sats good on O2 3l NC. Up with minimal assistance to BR. Several specimens sent to lab, still need sputum. NPO for possible bronch per MD notes, but MD has not spoken to pt about possible procedure yet (according to pt.) Will hold off on signing consent until MD discusses with pt.       Goal: Adult Individualization and Mutuality  Outcome: Ongoing (interventions implemented as appropriate)  Goal: Discharge Needs Assessment  Outcome: Ongoing (interventions implemented as appropriate)    Problem: Pneumonia (Adult)  Goal: Signs and Symptoms of Listed Potential Problems Will be Absent or Manageable (Pneumonia)  Outcome: Ongoing (interventions implemented as appropriate)    Problem: Fall Risk (Adult)  Goal: Identify Related Risk Factors and Signs and Symptoms  Outcome: Outcome(s) achieved Date Met:  06/01/17  Goal: Absence of Falls  Outcome: Ongoing (interventions implemented as appropriate)

## 2017-06-02 ENCOUNTER — ANESTHESIA (OUTPATIENT)
Dept: GASTROENTEROLOGY | Facility: HOSPITAL | Age: 82
End: 2017-06-02

## 2017-06-02 ENCOUNTER — ANESTHESIA EVENT (OUTPATIENT)
Dept: GASTROENTEROLOGY | Facility: HOSPITAL | Age: 82
End: 2017-06-02

## 2017-06-02 LAB
ANION GAP SERPL CALCULATED.3IONS-SCNC: 10.8 MMOL/L
BUN BLD-MCNC: 22 MG/DL (ref 8–23)
BUN/CREAT SERPL: 42.3 (ref 7–25)
CALCIUM SPEC-SCNC: 9 MG/DL (ref 8.6–10.5)
CHLORIDE SERPL-SCNC: 98 MMOL/L (ref 98–107)
CO2 SERPL-SCNC: 25.2 MMOL/L (ref 22–29)
CREAT BLD-MCNC: 0.52 MG/DL (ref 0.57–1)
DEPRECATED RDW RBC AUTO: 59 FL (ref 37–54)
ERYTHROCYTE [DISTWIDTH] IN BLOOD BY AUTOMATED COUNT: 16.8 % (ref 11.7–13)
GFR SERPL CREATININE-BSD FRML MDRD: 112 ML/MIN/1.73
GLUCOSE BLD-MCNC: 121 MG/DL (ref 65–99)
HCT VFR BLD AUTO: 22.8 % (ref 35.6–45.5)
HGB BLD-MCNC: 7.5 G/DL (ref 11.9–15.5)
MCH RBC QN AUTO: 31.6 PG (ref 26.9–32)
MCHC RBC AUTO-ENTMCNC: 32.9 G/DL (ref 32.4–36.3)
MCV RBC AUTO: 96.2 FL (ref 80.5–98.2)
NT-PROBNP SERPL-MCNC: 6238 PG/ML (ref 0–1800)
PLATELET # BLD AUTO: 335 10*3/MM3 (ref 140–500)
PMV BLD AUTO: 9.4 FL (ref 6–12)
POTASSIUM BLD-SCNC: 4 MMOL/L (ref 3.5–5.2)
RBC # BLD AUTO: 2.37 10*6/MM3 (ref 3.9–5.2)
SODIUM BLD-SCNC: 134 MMOL/L (ref 136–145)
WBC NRBC COR # BLD: 20.62 10*3/MM3 (ref 4.5–10.7)

## 2017-06-02 PROCEDURE — 99233 SBSQ HOSP IP/OBS HIGH 50: CPT | Performed by: INTERNAL MEDICINE

## 2017-06-02 PROCEDURE — 83880 ASSAY OF NATRIURETIC PEPTIDE: CPT | Performed by: INTERNAL MEDICINE

## 2017-06-02 PROCEDURE — 99222 1ST HOSP IP/OBS MODERATE 55: CPT | Performed by: INTERNAL MEDICINE

## 2017-06-02 PROCEDURE — 94799 UNLISTED PULMONARY SVC/PX: CPT

## 2017-06-02 PROCEDURE — 25010000002 CEFEPIME: Performed by: INTERNAL MEDICINE

## 2017-06-02 PROCEDURE — G0463 HOSPITAL OUTPT CLINIC VISIT: HCPCS | Performed by: INTERNAL MEDICINE

## 2017-06-02 PROCEDURE — 80048 BASIC METABOLIC PNL TOTAL CA: CPT | Performed by: INTERNAL MEDICINE

## 2017-06-02 PROCEDURE — 25010000002 FUROSEMIDE PER 20 MG: Performed by: INTERNAL MEDICINE

## 2017-06-02 PROCEDURE — 85027 COMPLETE CBC AUTOMATED: CPT | Performed by: INTERNAL MEDICINE

## 2017-06-02 RX ORDER — SODIUM CHLORIDE, SODIUM LACTATE, POTASSIUM CHLORIDE, CALCIUM CHLORIDE 600; 310; 30; 20 MG/100ML; MG/100ML; MG/100ML; MG/100ML
30 INJECTION, SOLUTION INTRAVENOUS CONTINUOUS PRN
Status: DISCONTINUED | OUTPATIENT
Start: 2017-06-02 | End: 2017-06-09 | Stop reason: HOSPADM

## 2017-06-02 RX ORDER — TORSEMIDE 20 MG/1
40 TABLET ORAL DAILY
Status: DISCONTINUED | OUTPATIENT
Start: 2017-06-03 | End: 2017-06-09 | Stop reason: HOSPADM

## 2017-06-02 RX ORDER — DIGOXIN 0.25 MG/ML
250 INJECTION INTRAMUSCULAR; INTRAVENOUS ONCE
Status: CANCELLED | OUTPATIENT
Start: 2017-06-02 | End: 2017-06-02

## 2017-06-02 RX ORDER — VERAPAMIL HYDROCHLORIDE 240 MG/1
240 TABLET, FILM COATED, EXTENDED RELEASE ORAL
Status: DISCONTINUED | OUTPATIENT
Start: 2017-06-02 | End: 2017-06-02

## 2017-06-02 RX ORDER — FUROSEMIDE 10 MG/ML
40 INJECTION INTRAMUSCULAR; INTRAVENOUS ONCE
Status: COMPLETED | OUTPATIENT
Start: 2017-06-02 | End: 2017-06-02

## 2017-06-02 RX ADMIN — Medication 1 CAPSULE: at 12:28

## 2017-06-02 RX ADMIN — IPRATROPIUM BROMIDE 0.5 MG: 0.5 SOLUTION RESPIRATORY (INHALATION) at 07:19

## 2017-06-02 RX ADMIN — IPRATROPIUM BROMIDE 0.5 MG: 0.5 SOLUTION RESPIRATORY (INHALATION) at 15:23

## 2017-06-02 RX ADMIN — IPRATROPIUM BROMIDE 0.5 MG: 0.5 SOLUTION RESPIRATORY (INHALATION) at 11:09

## 2017-06-02 RX ADMIN — METOPROLOL TARTRATE 25 MG: 25 TABLET ORAL at 20:32

## 2017-06-02 RX ADMIN — SODIUM CHLORIDE, POTASSIUM CHLORIDE, SODIUM LACTATE AND CALCIUM CHLORIDE 30 ML/HR: 600; 310; 30; 20 INJECTION, SOLUTION INTRAVENOUS at 09:35

## 2017-06-02 RX ADMIN — ACETAMINOPHEN 650 MG: 325 TABLET ORAL at 16:39

## 2017-06-02 RX ADMIN — DABIGATRAN ETEXILATE MESYLATE 150 MG: 150 CAPSULE ORAL at 20:32

## 2017-06-02 RX ADMIN — FUROSEMIDE 40 MG: 10 INJECTION, SOLUTION INTRAMUSCULAR; INTRAVENOUS at 11:20

## 2017-06-02 RX ADMIN — ACETAMINOPHEN 650 MG: 325 TABLET ORAL at 23:02

## 2017-06-02 RX ADMIN — METOPROLOL TARTRATE 25 MG: 25 TABLET ORAL at 08:04

## 2017-06-02 RX ADMIN — CEFEPIME 2 G: 2 INJECTION, POWDER, FOR SOLUTION INTRAVENOUS at 06:51

## 2017-06-02 RX ADMIN — VERAPAMIL HYDROCHLORIDE 180 MG: 180 TABLET, FILM COATED, EXTENDED RELEASE ORAL at 13:56

## 2017-06-02 RX ADMIN — IPRATROPIUM BROMIDE 0.5 MG: 0.5 SOLUTION RESPIRATORY (INHALATION) at 19:39

## 2017-06-02 RX ADMIN — CEFEPIME 2 G: 2 INJECTION, POWDER, FOR SOLUTION INTRAVENOUS at 20:32

## 2017-06-02 NOTE — PROGRESS NOTES
"                                              LOS: 2 days   Patient Care Team:  Matt Rose MD as PCP - General (Family Medicine)  Marcie Robbins MD as Consulting Physician (Cardiology)  Nilesh Danielle MD as Consulting Physician (Urology)    Chief Complaint:   Follow up on pneumonia, bronchiectasis, history of Mac and aspergillus    Interval History:   She reported continuous cough with greenish phlegm. She has dyspnea on minimal exertion.  She was taken to endoscopy today for bronchoscopy but was noted to be significantly tachycardic with mild respiratory distress and therefore procedure was canceled     REVIEW OF SYSTEMS:   CARDIOVASCULAR: No chest pain, chest pressure or chest discomfort. No palpitations or edema.   RESPIRATORY: see above  GASTROINTESTINAL: No anorexia, nausea, vomiting or diarrhea. No abdominal pain or blood.   HEMATOLOGIC: No bleeding or bruising.     Ventilator/Non-Invasive Ventilation Settings     None            Vital Signs  Temp:  [97.5 °F (36.4 °C)-98.6 °F (37 °C)] 97.7 °F (36.5 °C)  Heart Rate:  [103-134] 115  Resp:  [16-20] 16  BP: (118-144)/(75-99) 127/75    Intake/Output Summary (Last 24 hours) at 06/02/17 1536  Last data filed at 06/02/17 1411   Gross per 24 hour   Intake                0 ml   Output             1215 ml   Net            -1215 ml     Flowsheet Rows         First Filed Value    Admission Height  61\" (154.9 cm) Documented at 05/31/2017 1745    Admission Weight  117 lb (53.1 kg) Documented at 05/31/2017 1745          Physical Exam:   General Appearance:    Alert, cooperative, in no acute distress   Lungs:     Bilateral coarse inspiratory and  expiratory crackles.  No wheezing    Heart:    Irregular with tachycardia.  No murmur    Chest Wall:    No abnormalities observed   Abdomen:    Soft.  Positive bowel sounds.  No tenderness    Neuro:   Conscious, alert, oriented x3   Extremities:   Moves all extremities well, no edema, no cyanosis, no             " Redness          Results Review:          Results from last 7 days  Lab Units 06/02/17  0615 05/31/17  1842   SODIUM mmol/L 134* 131*   POTASSIUM mmol/L 4.0 3.7   CHLORIDE mmol/L 98 89*   TOTAL CO2 mmol/L 25.2 25.6   BUN mg/dL 22 13   CREATININE mg/dL 0.52* 0.68   GLUCOSE mg/dL 121* 123*   CALCIUM mg/dL 9.0 8.9           Results from last 7 days  Lab Units 06/02/17  0614 05/31/17  1842   WBC 10*3/mm3 20.62* 17.98*   HEMOGLOBIN g/dL 7.5* 8.5*   HEMATOCRIT % 22.8* 25.6*   PLATELETS 10*3/mm3 335 362       Results from last 7 days  Lab Units 05/31/17  1842   INR  1.96*         @LABNT(bnp)@  I reviewed the patient's new clinical results.  I personally viewed and interpreted the patient's CXR        Medication Review:     cefepime 2 g Intravenous Q12H   dabigatran etexilate 150 mg Oral Q12H   ipratropium 0.5 mg Nebulization 4x Daily - RT   lactobacillus acidophilus 1 capsule Oral Daily   metoprolol tartrate 25 mg Oral Q12H   [START ON 6/3/2017] torsemide 40 mg Oral Daily   verapamil  mg Oral Q24H         lactated ringers 30 mL/hr Last Rate: Stopped (06/02/17 1045)       Diagnostic imaging:  I personally and independently reviewed theFollowing images:    Mild bronchiectasis, more prominent prominent in the right middle lobe.  Bilateral consolidation in the left upper lobe, right middle lobe and right lower lobe                                                           Assessment:    1) Bilateral community acquired pneumonia  2) sepsis, 2ary to #1, improved  3) Acute on chronic hypoxic resp failure  4) Pulmonary interisital edema  5) A fib with RVR now  6) COPD, stable  7) Bronchiectasis with pulmonary MAC and aspergillus   8) Recurrent Clostridium difficile diarrhea  9) Hyponatremia, mild: Likely related to underlying lung disease    Plan:  - Antibiotics with cefepime, day 3 per ID.    - Stop IV fluid  - Diuresis per cardilogy  - Started on Metoprolol and Cardizem  - Bronchoscopy was canceled today due to her  worsening tachycardia and respiratory status.  I rescheduled for tomorrow at 9 AM. I think she should be ready by that time.  Infiltrates are located mostly in the Lateral subsegment of the right lower lobe and the apical segment of the left upper lobe        Sung King MD  06/02/17  3:36 PM            EMR Dragon/Transcription disclaimer:   Much of this encounter note is an electronic transcription/translation of spoken language to printed text. The electronic translation of spoken language may permit erroneous, or at times, nonsensical words or phrases to be inadvertently transcribed; Although I have reviewed the note for such errors, some may still exist.

## 2017-06-02 NOTE — CONSULTS
"         Patient Name: Agnieszka Rizzo  Age/Sex: 86 y.o. female  : 1930  MRN: [id number]    Date of Admission: 2017  Date of Encounter Visit: 17  Encounter Provider: Gabe Stauffer MD  Place of Service: [facility] CARDIOLOGY      Referring Provider: Pravin Jackson MD  Patient Care Team:  Matt Rose MD as PCP - General (Family Medicine)  Marcie Robbins MD as Consulting Physician (Cardiology)  Nilesh Danielle MD as Consulting Physician (Urology)    Subjective:   Please refer to previous note on:   H&P reviewed. The patient was examined and there are no changes to the H&P.    Admitted/Consulted for: Atrial fibrillation with rapid ventricular rate         History of Present Illness:  Agnieszka Rizzo is a 86 y.o. female who is normally followed by Dr. Robbins for a history of permanent atrial fibrillation s/p failed cardioversion (2017), nonobstructive CAD, diastolic heart failure, HTN, and HLD. She has significant pulmonary disease with home 02 used at night, aspergillosis in 2017,  and is followed by Dr. Tucker. Over the last 2 weeks, patient has experienced a nonproductive \"hacking\" cough. She called the office last week with BP and HR reports. She was tachycardic. Dr. Robbins stopped her losartan and started verapamil. Patient called back on  with increasing SOA to the point she was gasping at rest and unable to do any ADL's.  The shortness of air was severe.  Did not increase with supine position.  Did increase with minimal exertion.  No chest pain.  Denied any significant lower extremity edema or palpitations.      Patient was instructed to go to the emergency room and was transported via EMS. CT of the chest showed severe complicated pneumonia. She has been tachycardic and was started on metoprolol for rate control. Patient was previously rate controlled with diltiazem, and reports she had no issue with rate while on it. Insurance did not approve " it and she feels she has been uncontrolled.  Patient is being evaluated by pulmonary and set up for bronchoscopy today.  Noted as well to have mild increase in shortness of air over the last 24 hours and significant increase in proBNP.    Previous testing:   Lower extremity doppler 3/17/17  · There was superficial venous valvular incompetence noted in the right saphenofemoral junction.  · Sub-acute right lower extremity deep vein thrombosis noted in the gastrocnemius/soleal.  · Normal left lower extremity venous duplex scan.    Stress 12/19/16  · Myocardial perfusion imaging indicates a normal myocardial perfusion study with no evidence of ischemia.  · Left ventricular ejection fraction is hyperdynamic (Calculated EF > 70%).  · Impressions are consistent with a low risk study.  · There is no prior study available for comparison    Echo 11/15/16  · All left ventricular wall segments contract normally.  · Left ventricular function is normal. Estimated EF = 58%.  · Left atrial cavity size is moderately dilated.  · Mild tricuspid valve regurgitation is present.  · RVSP(TR) 32.4 mmHg  · Mild mitral valve regurgitation is present        Past Medical History:  Past Medical History:   Diagnosis Date   • Aspergillus    • Asthma    • Atrial fibrillation     chronic   • Atrial flutter    • Bronchiectasis    • C. difficile diarrhea 3/11/2017   • CAD (coronary artery disease)     nonobstructive   • Chronic diastolic CHF (congestive heart failure)    • Colitis    • Cough    • Cryoglobulinemia    • Fall    • Hyperlipidemia    • Hypertension    • Hyponatremia    • Hypoxia    • Infectious viral hepatitis     AGE 13   • Leg swelling    • Lesion of lung    • Mild tricuspid regurgitation    • MR (mitral regurgitation)     mild   • MVP (mitral valve prolapse)    • Permanent atrial fibrillation    • Pneumonia with the fungal infection aspergillosis 1/6/2017   • Pneumothorax    • SOB (shortness of breath)    • UTI (urinary tract  infection)    • Wheeze     mild       Past Surgical History:   Procedure Laterality Date   • BRONCHOSCOPY N/A 11/12/2016    Procedure: BRONCHOSCOPY WITH FLUORO, BRUSHINGS, BAL, AND BIOPSIES;  Surgeon: Rogelio Tucker MD;  Location: [facility] ENDOSCOPY;  Service:    • CATARACT EXTRACTION EXTRACAPSULAR W/ INTRAOCULAR LENS IMPLANTATION     • COLONOSCOPY      2013   • D&C WITH SUCTION     • HYSTERECTOMY     • KNEE ARTHROSCOPY Left        Home Medications:   Prescriptions Prior to Admission   Medication Sig Dispense Refill Last Dose   • acetylcysteine (MUCOMYST) 20 % nebulizer solution Take 4 mL by nebulization 4 (Four) Times a Day. (Patient taking differently: Take 4 mL by nebulization 2 (Two) Times a Day.) 500 mL 1 5/31/2017 at 0800   • ADVAIR -21 MCG/ACT inhaler Inhale 2 puffs 2 (Two) Times a Day. 1 inhaler 0 5/31/2017 at 0800   • atorvastatin (LIPITOR) 40 MG tablet Take 1 tablet by mouth Daily. 90 tablet 3 5/30/2017 at Unknown time   • B Complex-C (SUPER B COMPLEX PO) Take 1 tablet by mouth Daily.   5/30/2017 at Unknown time   • calcium carbonate (OS-EVIN) 600 MG tablet Take 600 mg by mouth Daily.   5/31/2017 at Unknown time   • cetirizine (zyrTEC) 10 MG tablet Take 10 mg by mouth Daily.   5/31/2017 at Unknown time   • cholecalciferol (VITAMIN D3) 1000 UNITS tablet Take 1,000 Units by mouth Daily.   5/30/2017 at Unknown time   • citalopram (CeleXA) 20 MG tablet Take 20 mg by mouth Daily.   5/31/2017 at Unknown time   • dabigatran etexilate (PRADAXA) 150 MG capsu Take 1 capsule by mouth Every 12 (Twelve) Hours. 180 capsule 3 5/31/2017 at 0800   • esomeprazole (nexIUM) 20 MG capsule Take 20 mg by mouth Every Morning Before Breakfast.   5/31/2017 at Unknown time   • ezetimibe (ZETIA) 10 MG tablet Take 1 tablet by mouth Daily. (Patient taking differently: Take 5 mg by mouth Daily.) 90 tablet 3 5/30/2017 at Unknown time   • ferrous sulfate 325 (65 FE) MG tablet Take 1 tablet by mouth 3 (Three) Times a Day With Meals. 90  tablet 1 2017 at 1200   • furosemide (LASIX) 40 MG tablet Take 40 mg by mouth 2 (Two) Times a Day. 40 mg in am & 20 mg in afternoon      • glucosamine-chondroitin 500-400 MG capsule capsule Take 1 capsule by mouth Daily.   2017 at Unknown time   • GuaiFENesin (MUCINEX PO) Take 2 tablets by mouth Daily. In the afternoon   2017 at Unknown time   • Lactobacillus (ACIDOPHILUS PO) Take 2 tablets by mouth Daily.   2017 at Unknown time   • losartan (COZAAR) 50 MG tablet Take 50 mg by mouth 2 (Two) Times a Day. On hold past 4 days per Dr. Robbins - cardio      • potassium chloride (K-DUR,KLOR-CON) 10 MEQ CR tablet Take 10 mEq by mouth Daily.   2017 at Unknown time   • [] torsemide (DEMADEX) 20 MG tablet Take 2 tablets by mouth Daily for 30 days. 60 tablet 0 2017 at Unknown time   • VERAMYST 27.5 MCG/SPRAY nasal spray 1 spray into each nostril Daily.   2017 at Unknown time   • verapamil PM (VERELAN PM) 360 MG 24 hr capsule Take 1 capsule by mouth Every Night. 30 capsule 11    • Multiple Vitamin (MULTIVITAMIN) tablet Take 1 tablet by mouth Daily.   Taking       Allergies:  Allergies   Allergen Reactions   • Amlodipine Besylate Swelling   • Aspirin      Caused bleeding ulcers   • Bactrim [Sulfamethoxazole-Trimethoprim] Nausea And Vomiting   • Erythromycin    • Levaquin [Levofloxacin]    • Macrobid [Nitrofurantoin] Nausea Only   • Penicillins      Has tolerated ceftriaxone in the past   • Ramipril Other (See Comments)     Cough       Past Social History:  Social History     Social History   • Marital status:      Spouse name: N/A   • Number of children: N/A   • Years of education: N/A     Occupational History   • Not on file.     Social History Main Topics   • Smoking status: Never Smoker   • Smokeless tobacco: Never Used   • Alcohol use Yes      Comment: occasional   • Drug use: No   • Sexual activity: Yes     Partners: Male     Other Topics Concern   • Not on file     Social  History Narrative        Past Family History:  Family History   Problem Relation Age of Onset   • Hypertension Mother    • Stroke Mother    • Hypertension Father    • Cancer Son    • Cancer Brother          Review of Systems:  All systems reviewed. Pertinent positives identified in HPI. All other systems are negative.       Objective:     Objective:  Temp:  [97.5 °F (36.4 °C)-98.2 °F (36.8 °C)] 97.5 °F (36.4 °C)  Heart Rate:  [] 129  Resp:  [16-20] 16  BP: (118-144)/(80-99) 144/99    Intake/Output Summary (Last 24 hours) at 06/02/17 0813  Last data filed at 06/02/17 0641   Gross per 24 hour   Intake                0 ml   Output              515 ml   Net             -515 ml     Body mass index is 21.75 kg/(m^2).  Last 3 weights    05/31/17  1745 05/31/17  2114 06/02/17  0500   Weight: 117 lb (53.1 kg) 121 lb 14.4 oz (55.3 kg) 122 lb 12.8 oz (55.7 kg)           Physical Exam:   General Appearance Well developed, cooperative and well nourished and no acute distress   Head Normocephalic, without abnormality, atraumatic   Ears Ears appear intact with no abnormalities noted   Throat No oral lesions, no thrush, oral mucosa moist   Neck No adenopathy, supple, trachea midline, no thyromegaly, no carotid bruit, no JVD   Back No skin lesions, erythema or scars, no tenderness to percussion or palptaion,range of motion is normal   Lungs Rales bilaterally    Heart Normal rate, irregular, normal S1 and S2, no murmur, no gallop, no rub, no click   Chest wall No abnormalities observed   Abdomen Normal bowel sounds, no masses, no hepatomegaly,    Extremities Moves all extremities well, no edema, no cyanosis, no redness   Pulses Pulses palpable and equal bilaterally. Normal radial, carotid, femoral, dorsalis pedis and posterior tibial pulses bilaterally. Normal abdominal aorta   Skin No bleeding, bruising or rash   Psyhiatric Alert and oriented x 3, normal mood and affect       Lab Review:       Results from last 7 days  Lab  Units 06/02/17  0615 05/31/17  1842   SODIUM mmol/L 134* 131*   POTASSIUM mmol/L 4.0 3.7   CHLORIDE mmol/L 98 89*   TOTAL CO2 mmol/L 25.2 25.6   BUN mg/dL 22 13   CREATININE mg/dL 0.52* 0.68   GLUCOSE mg/dL 121* 123*   CALCIUM mg/dL 9.0 8.9   AST (SGOT) U/L  --  51*   ALT (SGPT) U/L  --  55*           Results from last 7 days  Lab Units 06/02/17  0614   WBC 10*3/mm3 20.62*   HEMOGLOBIN g/dL 7.5*   HEMATOCRIT % 22.8*   PLATELETS 10*3/mm3 335       Results from last 7 days  Lab Units 05/31/17  1842   INR  1.96*                   Results from last 7 days  Lab Units 06/02/17  0615   PROBNP pg/mL 6238.0*           Results from last 7 days  Lab Units 05/31/17  1842   TSH mIU/mL 1.630       Imaging:    Imaging Results (most recent)     None          EKG:         BASELINE:       I personally viewed and interpreted the patient's EKG/Telemetry data.    Assessment:   Assessment/Plan   Active Problems:    Pneumonia of both lungs due to infectious organism  Permanent atrial fibrillation with rapid ventricular rate currently.  Nonobstructive coronary disease  Chronic diastolic heart failure  Essential hypertension  Leukocytosis  Normocytic anemia  Chronic hypoxic respiratory failure  Bronchiectasis  Recurrent C. difficile colitis    -Patient with rapid ventricular rate currently.  She has not been on her verapamil during her inpatient stay here.  I will restart that at a lower dose as she is now on metoprolol tartrate as well.    -Looks to be very mildly volume overloaded today.  Her proBNP is also significantly increased with antibiotics and fluids.  Give one dose of IV Lasix and move back on the by mouth torsemide tomorrow.  We will continue to monitor.    Thank you for allowing me to participate in the care of Agnieszka Rizzo. Feel free to contact me directly with any further questions or concerns.    Gabe Stauffer MD  [facility]  06/02/17  8:13 AM

## 2017-06-02 NOTE — PLAN OF CARE
Problem: Patient Care Overview (Adult)  Goal: Plan of Care Review  Outcome: Ongoing (interventions implemented as appropriate)    06/02/17 0350   Coping/Psychosocial Response Interventions   Plan Of Care Reviewed With patient   Patient Care Overview   Progress improving   Outcome Evaluation   Outcome Summary/Follow up Plan NPO for bronch in AM. O2 3L. Afib on monitor. HR 's.       Goal: Adult Individualization and Mutuality  Outcome: Ongoing (interventions implemented as appropriate)  Goal: Discharge Needs Assessment  Outcome: Ongoing (interventions implemented as appropriate)    Problem: Pneumonia (Adult)  Goal: Signs and Symptoms of Listed Potential Problems Will be Absent or Manageable (Pneumonia)  Outcome: Ongoing (interventions implemented as appropriate)    Problem: Fall Risk (Adult)  Goal: Absence of Falls  Outcome: Ongoing (interventions implemented as appropriate)

## 2017-06-02 NOTE — PLAN OF CARE
Problem: Patient Care Overview (Adult)  Goal: Plan of Care Review  Outcome: Ongoing (interventions implemented as appropriate)    06/02/17 0350 06/02/17 1714   Coping/Psychosocial Response Interventions   Plan Of Care Reviewed With patient --    Patient Care Overview   Progress improving --    Outcome Evaluation   Outcome Summary/Follow up Plan --  cancelled bronch d/t afib with rvr, coughinh, npo after MN bronch in am, tolerating meals, pt stable       Goal: Adult Individualization and Mutuality  Outcome: Ongoing (interventions implemented as appropriate)    Problem: Pneumonia (Adult)  Goal: Signs and Symptoms of Listed Potential Problems Will be Absent or Manageable (Pneumonia)  Outcome: Ongoing (interventions implemented as appropriate)    Problem: Fall Risk (Adult)  Goal: Absence of Falls  Outcome: Ongoing (interventions implemented as appropriate)

## 2017-06-02 NOTE — PLAN OF CARE
Problem: GI Endoscopy (Adult)  Goal: Signs and Symptoms of Listed Potential Problems Will be Absent or Manageable (GI Endoscopy)  Outcome: Ongoing (interventions implemented as appropriate)    06/02/17 0916   GI Endoscopy   Problems Assessed (GI Endoscopy) all   Problems Present (GI Endoscopy) pain;infection;hypoxia/hypoxemia;other (see comments)  (A Fib with uncontrolled response)

## 2017-06-02 NOTE — ANESTHESIA POSTPROCEDURE EVALUATION
Patient: Agnieszka Rizzo    Procedure Summary     Date Anesthesia Start Anesthesia Stop Room / Location    06/02/17    [facility] /  [facility]       Procedure Diagnosis Surgeon Provider    BRONCHOSCOPY (Bilateral Bronchus) Pneumonia of both lungs due to infectious organism, unspecified part of lung  (Pneumonia of both lungs due to infectious organism, unspecified part of lung [J18.9]) Sung King MD No responsible provider of record.          Anesthesia Type: MAC  Last vitals  BP      Temp      Pulse    Resp      SpO2        Anesthesia Post Evaluation

## 2017-06-02 NOTE — ANESTHESIA PREPROCEDURE EVALUATION
Anesthesia Evaluation     Patient summary reviewed          Airway   Mallampati: III  TM distance: <3 FB  Neck ROM: limited  possible difficult intubation  Dental      Pulmonary     breath sounds clear to auscultation  Cardiovascular   Exercise tolerance: poor (<4 METS)    Rhythm: irregular  Rate: abnormal        Neuro/Psych  GI/Hepatic/Renal/Endo      Musculoskeletal     Abdominal    Substance History      OB/GYN          Other                                      Anesthesia Plan    ASA 3     MAC     intravenous induction   Anesthetic plan and risks discussed with patient.

## 2017-06-02 NOTE — PROGRESS NOTES
LOS: 2 days     Chief Complaint:  Follow-up cough    Interval History:  Afib with RVR has been a problem today. She feels palpitations. Cardiology restarted her verapamil and gave a dose of IV furosemide for mild volume overload. She has been afebrile. WBC up today but received IV steroids the day prior. She has been afebrile. She denies diarrhea on cefepime. Bronch delayed due to Afib with RVR.    ROS: no n/v; no CP    Vital Signs  Temp:  [97.5 °F (36.4 °C)-98.6 °F (37 °C)] 97.7 °F (36.5 °C)  Heart Rate:  [103-134] 126  Resp:  [16-20] 16  BP: (118-144)/(75-99) 127/75    Physical Exam:  General: awake, fatigued  Head: Normocephalic  Eyes: R eye ptosis, PERRL, no scleral icterus  ENT: MMM, OP clear, no thrush.   Neck: Supple  Cardiovascular: tachycardic, irreg irreg, no murmurs, rubs, or gallops; no LE edema  Respiratory: Lungs with rales in BLL; faint wheeze; normal WOB on 3L NC  GI: Abdomen is soft, mild TTP, mildly distended  : no Long catheter present  Musculoskeletal: no joint abnormalities, normal musculature  Skin: No rashes, lesions, or embolic phenomenon  Neurological: Alert and oriented x 3, motor strength 4/5 in all four extremities  Psychiatric: Normal mood and affect   Vasc: no cyanosis; PIV w/o erythema    Antibiotics:  •  cefepime (MAXIPIME) 2 g/100 mL 0.9% NS (mbp), 2 g, Intravenous, Q12H, Pravin aJckson MD, 2 g at 06/02/17 0651      LABS:  CBC, BMP, BNP, micro reviewed today  Lab Results   Component Value Date    WBC 20.62 (H) 06/02/2017    HGB 7.5 (L) 06/02/2017    HCT 22.8 (L) 06/02/2017    MCV 96.2 06/02/2017     06/02/2017     Lab Results   Component Value Date    GLUCOSE 121 (H) 06/02/2017    BUN 22 06/02/2017    CREATININE 0.52 (L) 06/02/2017    EGFRIFNONA 112 06/02/2017    BCR 42.3 (H) 06/02/2017    CO2 25.2 06/02/2017    CALCIUM 9.0 06/02/2017    ALBUMIN 3.50 05/31/2017    LABIL2 0.9 05/31/2017    AST 51 (H) 05/31/2017    ALT 55 (H) 05/31/2017     BNP 6238  LA 0.8  procal  0.18    Microbiology:  5/31 BCx: NGTD  5/31 RVP: negative  5/31 Sputum Cx: pending     Radiology (personally reviewed):   No new imaging today    Assessment/Plan   1. 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 Bilateral pneumonia  3. Recurrent Clostridium difficile diarrhea - no current diarrhea  4. Chronic diastolic heart failure  5. Afib with rapid ventricular response - new    I think she is has an exacerbation of her bronchiectasis and her CT does appear worse in terms of nodules so it seems her MAC has made some progression. I also think there could be some component of superimposed typical bacterial infection at this time. I recommend proceeding w/  Bronchoscopy when stable. I will plan to treat with about 7 days of cefepime (unless bronch results show some pathogen not covered by it) and then plan to see her in about 2 weeks in my clinic to start MAC therapy. In the meantime, I recommend bronchiectasis treatment and airway clearance measures as RX'ed by pulmonary. I am not opposed to further steroids (received 1 dose in ER and WBC up today). Cardiology is now following her for the Afib with RVR and elevated BNP which led to canceling of bronch today.       Thank you for this consult. ID will follow. I have discussed this case with Dr Tucker.

## 2017-06-03 ENCOUNTER — ANESTHESIA (OUTPATIENT)
Dept: GASTROENTEROLOGY | Facility: HOSPITAL | Age: 82
End: 2017-06-03

## 2017-06-03 ENCOUNTER — ANESTHESIA EVENT (OUTPATIENT)
Dept: GASTROENTEROLOGY | Facility: HOSPITAL | Age: 82
End: 2017-06-03

## 2017-06-03 LAB
BACTERIA SPEC RESP CULT: NORMAL
GRAM STN SPEC: NORMAL
MRSA SPEC QL CULT: NORMAL
RESULT: >500 PG/ML

## 2017-06-03 PROCEDURE — 0B9G8ZX DRAINAGE OF LEFT UPPER LUNG LOBE, VIA NATURAL OR ARTIFICIAL OPENING ENDOSCOPIC, DIAGNOSTIC: ICD-10-PCS | Performed by: INTERNAL MEDICINE

## 2017-06-03 PROCEDURE — 25010000002 CEFEPIME: Performed by: INTERNAL MEDICINE

## 2017-06-03 PROCEDURE — 87102 FUNGUS ISOLATION CULTURE: CPT | Performed by: INTERNAL MEDICINE

## 2017-06-03 PROCEDURE — 94799 UNLISTED PULMONARY SVC/PX: CPT

## 2017-06-03 PROCEDURE — 87205 SMEAR GRAM STAIN: CPT | Performed by: INTERNAL MEDICINE

## 2017-06-03 PROCEDURE — 0B9F8ZX DRAINAGE OF RIGHT LOWER LUNG LOBE, VIA NATURAL OR ARTIFICIAL OPENING ENDOSCOPIC, DIAGNOSTIC: ICD-10-PCS | Performed by: INTERNAL MEDICINE

## 2017-06-03 PROCEDURE — 87206 SMEAR FLUORESCENT/ACID STAI: CPT | Performed by: INTERNAL MEDICINE

## 2017-06-03 PROCEDURE — 25010000002 PROPOFOL 10 MG/ML EMULSION: Performed by: ANESTHESIOLOGY

## 2017-06-03 PROCEDURE — 87116 MYCOBACTERIA CULTURE: CPT | Performed by: INTERNAL MEDICINE

## 2017-06-03 PROCEDURE — 88305 TISSUE EXAM BY PATHOLOGIST: CPT | Performed by: INTERNAL MEDICINE

## 2017-06-03 PROCEDURE — 25010000002 DIGOXIN PER 500 MCG: Performed by: INTERNAL MEDICINE

## 2017-06-03 PROCEDURE — 87071 CULTURE AEROBIC QUANT OTHER: CPT | Performed by: INTERNAL MEDICINE

## 2017-06-03 PROCEDURE — 99232 SBSQ HOSP IP/OBS MODERATE 35: CPT | Performed by: INTERNAL MEDICINE

## 2017-06-03 PROCEDURE — 88112 CYTOPATH CELL ENHANCE TECH: CPT | Performed by: INTERNAL MEDICINE

## 2017-06-03 RX ORDER — LIDOCAINE HYDROCHLORIDE 10 MG/ML
INJECTION, SOLUTION EPIDURAL; INFILTRATION; INTRACAUDAL; PERINEURAL AS NEEDED
Status: DISCONTINUED | OUTPATIENT
Start: 2017-06-03 | End: 2017-06-03 | Stop reason: HOSPADM

## 2017-06-03 RX ORDER — PROPOFOL 10 MG/ML
VIAL (ML) INTRAVENOUS AS NEEDED
Status: DISCONTINUED | OUTPATIENT
Start: 2017-06-03 | End: 2017-06-03 | Stop reason: SURG

## 2017-06-03 RX ORDER — PROPOFOL 10 MG/ML
VIAL (ML) INTRAVENOUS CONTINUOUS PRN
Status: DISCONTINUED | OUTPATIENT
Start: 2017-06-03 | End: 2017-06-03 | Stop reason: SURG

## 2017-06-03 RX ORDER — SODIUM CHLORIDE 0.9 % (FLUSH) 0.9 %
3 SYRINGE (ML) INJECTION AS NEEDED
Status: DISCONTINUED | OUTPATIENT
Start: 2017-06-03 | End: 2017-06-03

## 2017-06-03 RX ORDER — DIGOXIN 0.25 MG/ML
250 INJECTION INTRAMUSCULAR; INTRAVENOUS ONCE
Status: COMPLETED | OUTPATIENT
Start: 2017-06-03 | End: 2017-06-03

## 2017-06-03 RX ORDER — SODIUM CHLORIDE, SODIUM LACTATE, POTASSIUM CHLORIDE, CALCIUM CHLORIDE 600; 310; 30; 20 MG/100ML; MG/100ML; MG/100ML; MG/100ML
1000 INJECTION, SOLUTION INTRAVENOUS CONTINUOUS PRN
Status: DISCONTINUED | OUTPATIENT
Start: 2017-06-03 | End: 2017-06-09 | Stop reason: HOSPADM

## 2017-06-03 RX ORDER — FLUCONAZOLE 100 MG/1
100 TABLET ORAL EVERY 24 HOURS
Status: COMPLETED | OUTPATIENT
Start: 2017-06-03 | End: 2017-06-07

## 2017-06-03 RX ADMIN — ACETAMINOPHEN 650 MG: 325 TABLET ORAL at 22:17

## 2017-06-03 RX ADMIN — ALFENTANIL HYDROCHLORIDE 250 MCG: 500 INJECTION, SOLUTION INTRAVENOUS at 10:46

## 2017-06-03 RX ADMIN — SODIUM CHLORIDE, POTASSIUM CHLORIDE, SODIUM LACTATE AND CALCIUM CHLORIDE 1000 ML: 600; 310; 30; 20 INJECTION, SOLUTION INTRAVENOUS at 10:35

## 2017-06-03 RX ADMIN — ALFENTANIL HYDROCHLORIDE 250 MCG: 500 INJECTION, SOLUTION INTRAVENOUS at 10:52

## 2017-06-03 RX ADMIN — ALFENTANIL HYDROCHLORIDE 250 MCG: 500 INJECTION, SOLUTION INTRAVENOUS at 10:55

## 2017-06-03 RX ADMIN — METOPROLOL TARTRATE 25 MG: 25 TABLET ORAL at 08:22

## 2017-06-03 RX ADMIN — DIGOXIN 250 MCG: 0.25 INJECTION INTRAMUSCULAR; INTRAVENOUS at 14:15

## 2017-06-03 RX ADMIN — IPRATROPIUM BROMIDE 0.5 MG: 0.5 SOLUTION RESPIRATORY (INHALATION) at 07:14

## 2017-06-03 RX ADMIN — IPRATROPIUM BROMIDE 0.5 MG: 0.5 SOLUTION RESPIRATORY (INHALATION) at 14:53

## 2017-06-03 RX ADMIN — METOPROLOL TARTRATE 25 MG: 25 TABLET ORAL at 20:28

## 2017-06-03 RX ADMIN — CEFEPIME 2 G: 2 INJECTION, POWDER, FOR SOLUTION INTRAVENOUS at 12:10

## 2017-06-03 RX ADMIN — TORSEMIDE 40 MG: 20 TABLET ORAL at 08:42

## 2017-06-03 RX ADMIN — IPRATROPIUM BROMIDE 0.5 MG: 0.5 SOLUTION RESPIRATORY (INHALATION) at 19:57

## 2017-06-03 RX ADMIN — Medication 1 CAPSULE: at 08:42

## 2017-06-03 RX ADMIN — DABIGATRAN ETEXILATE MESYLATE 150 MG: 150 CAPSULE ORAL at 20:27

## 2017-06-03 RX ADMIN — VERAPAMIL HYDROCHLORIDE 180 MG: 180 TABLET, FILM COATED, EXTENDED RELEASE ORAL at 08:22

## 2017-06-03 RX ADMIN — ALFENTANIL HYDROCHLORIDE 250 MCG: 500 INJECTION, SOLUTION INTRAVENOUS at 10:49

## 2017-06-03 RX ADMIN — FLUCONAZOLE 100 MG: 100 TABLET ORAL at 14:28

## 2017-06-03 RX ADMIN — SODIUM CHLORIDE, POTASSIUM CHLORIDE, SODIUM LACTATE AND CALCIUM CHLORIDE: 600; 310; 30; 20 INJECTION, SOLUTION INTRAVENOUS at 10:40

## 2017-06-03 RX ADMIN — PROPOFOL 130 MG: 10 INJECTION, EMULSION INTRAVENOUS at 10:46

## 2017-06-03 RX ADMIN — DABIGATRAN ETEXILATE MESYLATE 150 MG: 150 CAPSULE ORAL at 08:42

## 2017-06-03 RX ADMIN — PROPOFOL 180 MCG/KG/MIN: 10 INJECTION, EMULSION INTRAVENOUS at 10:46

## 2017-06-03 RX ADMIN — CEFEPIME 2 G: 2 INJECTION, POWDER, FOR SOLUTION INTRAVENOUS at 23:34

## 2017-06-03 NOTE — ANESTHESIA PREPROCEDURE EVALUATION
Anesthesia Evaluation     Patient summary reviewed and Nursing notes reviewed          Airway   Mallampati: II  TM distance: >3 FB  Neck ROM: full  Dental - normal exam     Pulmonary - normal exam   (+) pneumonia , asthma, shortness of breath,   Cardiovascular - normal exam    (+) hypertension, valvular problems/murmurs MR, CAD, dysrhythmias Atrial Flutter, CHF, hyperlipidemia  (-) angina, orthopnea, PND, RAMOS      Neuro/Psych- negative ROS  GI/Hepatic/Renal/Endo    (+)  hepatitis, liver disease,     Musculoskeletal (-) negative ROS    Abdominal  - normal exam   Substance History - negative use     OB/GYN          Other - negative ROS                                       Anesthesia Plan    ASA 4     general     intravenous induction   Anesthetic plan and risks discussed with patient.

## 2017-06-03 NOTE — ANESTHESIA PROCEDURE NOTES
Airway  Urgency: elective    Airway not difficult    General Information and Staff    Patient location during procedure: OR  Anesthesiologist: JOANNE SNYDER    Indications and Patient Condition  Indications for airway management: airway protection    Preoxygenated: yes      Final Airway Details  Final airway type: supraglottic airway      Successful airway: classic  Size 4    Number of attempts at approach: 1

## 2017-06-03 NOTE — ANESTHESIA POSTPROCEDURE EVALUATION
Patient: Agnieszka Rizzo    Procedure Summary     Date Anesthesia Start Anesthesia Stop Room / Location    06/03/17 1040 1114  [facility] /  [facility]       Procedure Diagnosis Surgeon Provider    BRONCHOSCOPY with BAL  (Bilateral Bronchus) Pneumonia of both lower lobes due to infectious organism; Pneumonia of both lungs due to infectious organism, unspecified part of lung  (Pneumonia of both lower lobes due to infectious organism [J18.9]; Pneumonia of both lungs due to infectious organism, unspecified part of lung [J18.9]) MD Steven Moseley MD          Anesthesia Type: general  Last vitals  /72 (06/03/17 1125)    Temp      Pulse 91 (06/03/17 1125)   Resp 16 (06/03/17 1125)    SpO2 92 % (06/03/17 1125)      Post Anesthesia Care and Evaluation    Patient location during evaluation: PACU  Patient participation: complete - patient participated  Level of consciousness: awake  Pain management: adequate  Airway patency: patent  Anesthetic complications: No anesthetic complications    Cardiovascular status: acceptable  Respiratory status: acceptable  Hydration status: acceptable

## 2017-06-03 NOTE — PLAN OF CARE
Problem: Patient Care Overview (Adult)  Goal: Plan of Care Review  Outcome: Ongoing (interventions implemented as appropriate)    06/03/17 3033   Coping/Psychosocial Response Interventions   Plan Of Care Reviewed With patient   Patient Care Overview   Progress improving   Outcome Evaluation   Outcome Summary/Follow up Plan Bronchoscopy completed today. Patient states she feels much better, and no shortness of air reported. Heart rate continues to remain stable, in a fib. One time dose of Digoxin, IV, 250 mcg given to patient. Tolerated well. Non productive cough continues, but has lessened, per patient. Stress incontinence, patient wearing brief. Strict intake and output documented. Tolerating regular diet. No complaints. Will continue to monitor.        Goal: Adult Individualization and Mutuality  Outcome: Ongoing (interventions implemented as appropriate)  Goal: Discharge Needs Assessment  Outcome: Ongoing (interventions implemented as appropriate)    Problem: Pneumonia (Adult)  Goal: Signs and Symptoms of Listed Potential Problems Will be Absent or Manageable (Pneumonia)  Outcome: Ongoing (interventions implemented as appropriate)    Problem: Fall Risk (Adult)  Goal: Absence of Falls  Outcome: Ongoing (interventions implemented as appropriate)

## 2017-06-03 NOTE — PLAN OF CARE
Problem: Bronchoscopy (Adult)  Goal: Signs and Symptoms of Listed Potential Problems Will be Absent or Manageable (Bronchoscopy)  Outcome: Ongoing (interventions implemented as appropriate)    06/03/17 1018   Bronchoscopy   Problems Assessed (Bronchoscopy) pneumonia   Problems Present (Bronchoscopy) pneumonia

## 2017-06-03 NOTE — PLAN OF CARE
Problem: Patient Care Overview (Adult)  Goal: Plan of Care Review  Outcome: Ongoing (interventions implemented as appropriate)    06/03/17 0359   Coping/Psychosocial Response Interventions   Plan Of Care Reviewed With patient   Patient Care Overview   Progress improving   Outcome Evaluation   Outcome Summary/Follow up Plan bronch rescheduled for tomorrow, heart rate remained stable in afib. has a nonproductive cough, no other complaints,        Goal: Adult Individualization and Mutuality  Outcome: Ongoing (interventions implemented as appropriate)  Goal: Discharge Needs Assessment  Outcome: Ongoing (interventions implemented as appropriate)    Problem: Pneumonia (Adult)  Goal: Signs and Symptoms of Listed Potential Problems Will be Absent or Manageable (Pneumonia)  Outcome: Ongoing (interventions implemented as appropriate)    Problem: Fall Risk (Adult)  Goal: Absence of Falls  Outcome: Ongoing (interventions implemented as appropriate)    Problem: GI Endoscopy (Adult)  Goal: Signs and Symptoms of Listed Potential Problems Will be Absent or Manageable (GI Endoscopy)  Outcome: Ongoing (interventions implemented as appropriate)

## 2017-06-03 NOTE — PROGRESS NOTES
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   Patient of Dr Tucker    Interval History:   She reported continuous cough with greenish phlegm. She has dyspnea on minimal exertion.  She was taken to endoscopy today for bronchoscopy but was noted to be significantly tachycardic with mild respiratory distress and therefore procedure was canceled     REVIEW OF SYSTEMS:   CARDIOVASCULAR: No chest pain, chest pressure or chest discomfort. No palpitations or edema.   RESPIRATORY: see above  GASTROINTESTINAL: No anorexia, nausea, vomiting or diarrhea. No abdominal pain or blood.   HEMATOLOGIC: No bleeding or bruising.       Vital Signs  Temp:  [97.5 °F (36.4 °C)-98.7 °F (37.1 °C)] 98.4 °F (36.9 °C)  Heart Rate:  [] 92  Resp:  [16-20] 18  BP: (125-146)/() 125/77    Intake/Output Summary (Last 24 hours) at 06/03/17 1034  Last data filed at 06/03/17 0658   Gross per 24 hour   Intake              340 ml   Output             1250 ml   Net             -910 ml       Physical Exam:   General Appearance:    Alert, cooperative, in no acute distress   Lungs:     Bilateral coarse inspiratory and  expiratory crackles.  No wheezing    Heart:    Irregular with tachycardia.  No murmur    Chest Wall:    No abnormalities observed   Abdomen:    Soft.  Positive bowel sounds.  No tenderness    Neuro:   Conscious, alert, oriented x3   Extremities:   Moves all extremities well, no edema, no cyanosis, no             Redness        Results Review:          Results from last 7 days  Lab Units 06/02/17  0615 05/31/17  1842   SODIUM mmol/L 134* 131*   POTASSIUM mmol/L 4.0 3.7   CHLORIDE mmol/L 98 89*   TOTAL CO2 mmol/L 25.2 25.6   BUN  mg/dL 22 13   CREATININE mg/dL 0.52* 0.68   GLUCOSE mg/dL 121* 123*   CALCIUM mg/dL 9.0 8.9           Results from last 7 days  Lab Units 06/02/17  0614 05/31/17  1842   WBC 10*3/mm3 20.62* 17.98*   HEMOGLOBIN g/dL 7.5* 8.5*   HEMATOCRIT % 22.8* 25.6*   PLATELETS 10*3/mm3 335 362             Medication Review:     cefepime 2 g Intravenous Q12H   dabigatran etexilate 150 mg Oral Q12H   ipratropium 0.5 mg Nebulization 4x Daily - RT   lactobacillus acidophilus 1 capsule Oral Daily   metoprolol tartrate 25 mg Oral Q12H   torsemide 40 mg Oral Daily   verapamil  mg Oral Q24H         lactated ringers 1,000 mL    lactated ringers 30 mL/hr Last Rate: Stopped (06/02/17 1045)       Diagnostic imaging:  I personally and independently reviewed theFollowing images:    Mild bronchiectasis, more prominent prominent in the right middle lobe.  Bilateral consolidation in the left upper lobe, right middle lobe and right lower lobe                                                           Assessment:    1) Bilateral community acquired pneumonia  2) sepsis, 2ary to #1, improved  3) Acute on chronic hypoxic resp failure  4) Pulmonary interisital edema  5) A fib with RVR now  6) COPD, stable  7) Bronchiectasis with pulmonary MAC and aspergillus   8) Recurrent Clostridium difficile diarrhea  9) Hyponatremia, mild: Likely related to underlying lung disease    Plan:  - Antibiotics with cefepime, day 4 per ID.    - Diuresis per cardilogy  - Started on Metoprolol and Cardizem  - Bronchoscopy done today    Add Diflucan for thrush    Dr Tucker will follow from Sunday      Eva Squires MD  06/03/17  10:34 AM            EMR Dragon/Transcription disclaimer:   Much of this encounter note is an electronic transcription/translation of spoken language to printed text. The electronic translation of spoken language may permit erroneous, or at times, nonsensical words or phrases to be inadvertently transcribed; Although I have reviewed the  note for such errors, some may still exist.

## 2017-06-03 NOTE — PROGRESS NOTES
Hospital Follow Up        Chief Complaint: Follow up afib    Interval History: Status post bronchoscopy this morning.  Feels like she is breathing better.     Objective:     Objective:  Temp:  [97.5 °F (36.4 °C)-98.7 °F (37.1 °C)] 98.4 °F (36.9 °C)  Heart Rate:  [] 93  Resp:  [16-20] 16  BP: (103-146)/() 103/63     Intake/Output Summary (Last 24 hours) at 06/03/17 1317  Last data filed at 06/03/17 1150   Gross per 24 hour   Intake              640 ml   Output              550 ml   Net               90 ml     Body mass index is 21.61 kg/(m^2).  Last 3 weights    05/31/17  2114 06/02/17  0500 06/02/17  0912   Weight: 121 lb 14.4 oz (55.3 kg) 122 lb 12.8 oz (55.7 kg) 122 lb (55.3 kg)     Weight change: -12.8 oz (-0.363 kg)      Physical Exam:   General : Alert, cooperative, in no acute distress.  Neuro: alert,cooperative and oriented  Lungs: CTAB. Normal respiratory effort and rate.  CV:: irregularly, irregular, rate and rhythm, normal S1 and S2, no murmurs, gallops or rubs.  ABD: Soft, nontender, non-distended. positive bowel sounds  Extr: No edema or cyanosis, moves all extremities    Lab Review:     Results from last 7 days  Lab Units 06/02/17  0615 05/31/17  1842   SODIUM mmol/L 134* 131*   POTASSIUM mmol/L 4.0 3.7   CHLORIDE mmol/L 98 89*   TOTAL CO2 mmol/L 25.2 25.6   BUN mg/dL 22 13   CREATININE mg/dL 0.52* 0.68   GLUCOSE mg/dL 121* 123*   CALCIUM mg/dL 9.0 8.9   AST (SGOT) U/L  --  51*   ALT (SGPT) U/L  --  55*           Results from last 7 days  Lab Units 06/02/17  0614 05/31/17  1842   WBC 10*3/mm3 20.62* 17.98*   HEMOGLOBIN g/dL 7.5* 8.5*   HEMATOCRIT % 22.8* 25.6*   PLATELETS 10*3/mm3 335 362       Results from last 7 days  Lab Units 05/31/17  1842   INR  1.96*               Results from last 7 days  Lab Units 06/02/17  0615 06/01/17  0604   PROBNP pg/mL 6238.0* 1707.0       Results from last 7 days  Lab Units 05/31/17  1842   TSH mIU/mL 1.630     I reviewed the patient's new clinical  results.  I personally viewed and interpreted the patient's EKG  Current Medications:   Scheduled Meds:  cefepime 2 g Intravenous Q12H   dabigatran etexilate 150 mg Oral Q12H   fluconazole 100 mg Oral Q24H   ipratropium 0.5 mg Nebulization 4x Daily - RT   lactobacillus acidophilus 1 capsule Oral Daily   metoprolol tartrate 25 mg Oral Q12H   torsemide 40 mg Oral Daily   verapamil  mg Oral Q24H     Continuous Infusions:  lactated ringers 1,000 mL Last Rate: Stopped (06/03/17 1150)   lactated ringers 30 mL/hr Last Rate: 0 mL/hr (06/02/17 1045)       Allergies:  Allergies   Allergen Reactions   • Amlodipine Besylate Swelling   • Aspirin      Caused bleeding ulcers   • Bactrim [Sulfamethoxazole-Trimethoprim] Nausea And Vomiting   • Erythromycin    • Levaquin [Levofloxacin]    • Macrobid [Nitrofurantoin] Nausea Only   • Penicillins      Has tolerated ceftriaxone in the past   • Ramipril Other (See Comments)     Cough       Assessment/Plan:     1. Bilateral pneumonia: Status post bronchoscopy.    2. Acute/chronic respiratory failure  3. Atrial fibrillation: Fair rate control on verapamil and metoprolol.  On dabigatran for anticoagulation.   4. Acute/chronic diastolic CHF: Received IV furosemide yesterday.  Back on po torsemide.  5. Hypertension  6. Non-obstructive CAD  7. Bronchiectasis with pulmonary MAC and aspergillus  8. Anemia: Decline in hemoglobin yesterday.     -  Give a dose of digoxin IV x 1.   Not a lot of room to increase verapamil or metoprolol.   -  Recheck hemoglobin in am      Anayeli Christopher MD  06/03/17  1:17 PM

## 2017-06-04 LAB
ANION GAP SERPL CALCULATED.3IONS-SCNC: 9.9 MMOL/L
BASOPHILS # BLD AUTO: 0.02 10*3/MM3 (ref 0–0.2)
BASOPHILS NFR BLD AUTO: 0.2 % (ref 0–1.5)
BUN BLD-MCNC: 14 MG/DL (ref 8–23)
BUN/CREAT SERPL: 24.6 (ref 7–25)
CALCIUM SPEC-SCNC: 8.6 MG/DL (ref 8.6–10.5)
CHLORIDE SERPL-SCNC: 92 MMOL/L (ref 98–107)
CO2 SERPL-SCNC: 34.1 MMOL/L (ref 22–29)
CREAT BLD-MCNC: 0.57 MG/DL (ref 0.57–1)
DEPRECATED RDW RBC AUTO: 56.4 FL (ref 37–54)
EOSINOPHIL # BLD AUTO: 0.1 10*3/MM3 (ref 0–0.7)
EOSINOPHIL NFR BLD AUTO: 1.2 % (ref 0.3–6.2)
ERYTHROCYTE [DISTWIDTH] IN BLOOD BY AUTOMATED COUNT: 16.4 % (ref 11.7–13)
GFR SERPL CREATININE-BSD FRML MDRD: 101 ML/MIN/1.73
GLUCOSE BLD-MCNC: 92 MG/DL (ref 65–99)
HCT VFR BLD AUTO: 24.5 % (ref 35.6–45.5)
HGB BLD-MCNC: 8.2 G/DL (ref 11.9–15.5)
IMM GRANULOCYTES # BLD: 0.05 10*3/MM3 (ref 0–0.03)
IMM GRANULOCYTES NFR BLD: 0.6 % (ref 0–0.5)
LYMPHOCYTES # BLD AUTO: 1.64 10*3/MM3 (ref 0.9–4.8)
LYMPHOCYTES NFR BLD AUTO: 19.7 % (ref 19.6–45.3)
MCH RBC QN AUTO: 31.3 PG (ref 26.9–32)
MCHC RBC AUTO-ENTMCNC: 33.5 G/DL (ref 32.4–36.3)
MCV RBC AUTO: 93.5 FL (ref 80.5–98.2)
MONOCYTES # BLD AUTO: 1.1 10*3/MM3 (ref 0.2–1.2)
MONOCYTES NFR BLD AUTO: 13.2 % (ref 5–12)
NEUTROPHILS # BLD AUTO: 5.43 10*3/MM3 (ref 1.9–8.1)
NEUTROPHILS NFR BLD AUTO: 65.1 % (ref 42.7–76)
NRBC BLD MANUAL-RTO: 0 /100 WBC (ref 0–0)
PLAT MORPH BLD: NORMAL
PLATELET # BLD AUTO: 373 10*3/MM3 (ref 140–500)
PMV BLD AUTO: 9.4 FL (ref 6–12)
POTASSIUM BLD-SCNC: 3.2 MMOL/L (ref 3.5–5.2)
POTASSIUM BLD-SCNC: 4.1 MMOL/L (ref 3.5–5.2)
RBC # BLD AUTO: 2.62 10*6/MM3 (ref 3.9–5.2)
RBC AGGLUT BLD QL: NORMAL
SODIUM BLD-SCNC: 136 MMOL/L (ref 136–145)
WBC MORPH BLD: NORMAL
WBC NRBC COR # BLD: 8.34 10*3/MM3 (ref 4.5–10.7)

## 2017-06-04 PROCEDURE — 99232 SBSQ HOSP IP/OBS MODERATE 35: CPT | Performed by: INTERNAL MEDICINE

## 2017-06-04 PROCEDURE — 84132 ASSAY OF SERUM POTASSIUM: CPT | Performed by: INTERNAL MEDICINE

## 2017-06-04 PROCEDURE — 94799 UNLISTED PULMONARY SVC/PX: CPT

## 2017-06-04 PROCEDURE — 85025 COMPLETE CBC W/AUTO DIFF WBC: CPT | Performed by: INTERNAL MEDICINE

## 2017-06-04 PROCEDURE — 85007 BL SMEAR W/DIFF WBC COUNT: CPT | Performed by: INTERNAL MEDICINE

## 2017-06-04 PROCEDURE — 80048 BASIC METABOLIC PNL TOTAL CA: CPT | Performed by: INTERNAL MEDICINE

## 2017-06-04 PROCEDURE — 25010000002 CEFEPIME: Performed by: INTERNAL MEDICINE

## 2017-06-04 RX ORDER — PANTOPRAZOLE SODIUM 40 MG/1
40 TABLET, DELAYED RELEASE ORAL
Status: DISCONTINUED | OUTPATIENT
Start: 2017-06-04 | End: 2017-06-09 | Stop reason: HOSPADM

## 2017-06-04 RX ORDER — POTASSIUM CHLORIDE 750 MG/1
40 CAPSULE, EXTENDED RELEASE ORAL AS NEEDED
Status: DISCONTINUED | OUTPATIENT
Start: 2017-06-04 | End: 2017-06-09 | Stop reason: HOSPADM

## 2017-06-04 RX ORDER — POTASSIUM CHLORIDE 7.45 MG/ML
10 INJECTION INTRAVENOUS
Status: DISCONTINUED | OUTPATIENT
Start: 2017-06-04 | End: 2017-06-09 | Stop reason: HOSPADM

## 2017-06-04 RX ORDER — POTASSIUM CHLORIDE 1.5 G/1.77G
40 POWDER, FOR SOLUTION ORAL AS NEEDED
Status: DISCONTINUED | OUTPATIENT
Start: 2017-06-04 | End: 2017-06-05 | Stop reason: CLARIF

## 2017-06-04 RX ADMIN — TORSEMIDE 40 MG: 20 TABLET ORAL at 08:32

## 2017-06-04 RX ADMIN — Medication 1 CAPSULE: at 08:34

## 2017-06-04 RX ADMIN — VERAPAMIL HYDROCHLORIDE 180 MG: 180 TABLET, FILM COATED, EXTENDED RELEASE ORAL at 08:33

## 2017-06-04 RX ADMIN — DABIGATRAN ETEXILATE MESYLATE 150 MG: 150 CAPSULE ORAL at 08:32

## 2017-06-04 RX ADMIN — CEFEPIME 2 G: 2 INJECTION, POWDER, FOR SOLUTION INTRAVENOUS at 12:22

## 2017-06-04 RX ADMIN — POTASSIUM CHLORIDE 40 MEQ: 750 CAPSULE, EXTENDED RELEASE ORAL at 09:27

## 2017-06-04 RX ADMIN — IPRATROPIUM BROMIDE 0.5 MG: 0.5 SOLUTION RESPIRATORY (INHALATION) at 06:43

## 2017-06-04 RX ADMIN — IPRATROPIUM BROMIDE 0.5 MG: 0.5 SOLUTION RESPIRATORY (INHALATION) at 19:53

## 2017-06-04 RX ADMIN — DABIGATRAN ETEXILATE MESYLATE 150 MG: 150 CAPSULE ORAL at 21:13

## 2017-06-04 RX ADMIN — METOPROLOL TARTRATE 25 MG: 25 TABLET ORAL at 21:13

## 2017-06-04 RX ADMIN — METOPROLOL TARTRATE 25 MG: 25 TABLET ORAL at 08:34

## 2017-06-04 RX ADMIN — PANTOPRAZOLE SODIUM 40 MG: 40 TABLET, DELAYED RELEASE ORAL at 09:27

## 2017-06-04 RX ADMIN — ACETAMINOPHEN 650 MG: 325 TABLET ORAL at 21:23

## 2017-06-04 RX ADMIN — IPRATROPIUM BROMIDE 0.5 MG: 0.5 SOLUTION RESPIRATORY (INHALATION) at 14:46

## 2017-06-04 RX ADMIN — POTASSIUM CHLORIDE 40 MEQ: 750 CAPSULE, EXTENDED RELEASE ORAL at 15:14

## 2017-06-04 RX ADMIN — IPRATROPIUM BROMIDE 0.5 MG: 0.5 SOLUTION RESPIRATORY (INHALATION) at 11:32

## 2017-06-04 RX ADMIN — CEFEPIME 2 G: 2 INJECTION, POWDER, FOR SOLUTION INTRAVENOUS at 23:11

## 2017-06-04 RX ADMIN — FLUCONAZOLE 100 MG: 100 TABLET ORAL at 12:22

## 2017-06-04 NOTE — PROGRESS NOTES
"INFECTIOUS DISEASES PROGRESS NOTE    CC: f/u bronchiectasis flare    S:   Feels \"So much\" better  Cough much improved  No diarrhea  No f/c/ns    O:  Physical Exam:  Temp:  [97.8 °F (36.6 °C)-98.4 °F (36.9 °C)] 97.9 °F (36.6 °C)  Heart Rate:  [] 112  Resp:  [16-18] 18  BP: (103-146)/(63-91) 124/85  Physical Exam   Constitutional: She appears well-developed. No distress.   Cardiovascular: An irregularly irregular rhythm present. Tachycardia present.    Pulmonary/Chest: Effort normal. She has rhonchi. She has rales in the right lower field and the left lower field.        Diagnostics:    WBC 8.3 (P65, L 20, M 13, E1)  H/H 8.2/24    Cr 0.57    Microbiology:  5/31 BCx: NGTD  5/31 RVP: negative  5/31 Sputum Cx: nl fortino  6/3 BAL  -bacterial NGTD  -AFB, fungal cx P (rare fungal elements on GMS)    Assessment/Plan   1. 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 Bilateral pneumonia  3. Recurrent Clostridium difficile diarrhea - no current diarrhea  4. Chronic diastolic heart failure  5. Afib with rapid ventricular response    Doing better. Cont on cefepime 2g q12.  BAL cx thus far ngtd.  Plan for a week of abx for bronchiectasis exacerbation and probably initiate MAC therapy as outpatient.  D/w Dr. Squires on bronchoscopy yesterday and not unexpected findings on bronchoscopy although significant mucus plugging.      Vignesh Meyer MD  8:25 AM  06/04/17         "

## 2017-06-04 NOTE — PLAN OF CARE
Problem: Patient Care Overview (Adult)  Goal: Plan of Care Review  Outcome: Ongoing (interventions implemented as appropriate)    06/04/17 0324   Coping/Psychosocial Response Interventions   Plan Of Care Reviewed With patient   Patient Care Overview   Progress improving   Outcome Evaluation   Outcome Summary/Follow up Plan pt states feeling better after brochoscopy. Cough with production. complained of a headache and tylenol was given. no sign of distress       Goal: Adult Individualization and Mutuality  Outcome: Ongoing (interventions implemented as appropriate)  Goal: Discharge Needs Assessment  Outcome: Ongoing (interventions implemented as appropriate)    Problem: Pneumonia (Adult)  Goal: Signs and Symptoms of Listed Potential Problems Will be Absent or Manageable (Pneumonia)  Outcome: Ongoing (interventions implemented as appropriate)    Problem: Fall Risk (Adult)  Goal: Absence of Falls  Outcome: Ongoing (interventions implemented as appropriate)

## 2017-06-04 NOTE — PROGRESS NOTES
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   Patient of Dr Tucker    Interval History:   Bronchoscopy was done on 6/3/2017. Still c/o cough     REVIEW OF SYSTEMS:   CARDIOVASCULAR: No chest pain, chest pressure or chest discomfort. No palpitations or edema.   RESPIRATORY: see above  GASTROINTESTINAL: No anorexia, nausea, vomiting or diarrhea. No abdominal pain or blood.   HEMATOLOGIC: No bleeding or bruising.       Vital Signs  Temp:  [97.7 °F (36.5 °C)-97.9 °F (36.6 °C)] 97.7 °F (36.5 °C)  Heart Rate:  [] 83  Resp:  [16-18] 16  BP: ()/(63-88) 94/63    Intake/Output Summary (Last 24 hours) at 06/04/17 1457  Last data filed at 06/03/17 2334   Gross per 24 hour   Intake              640 ml   Output              700 ml   Net              -60 ml       Physical Exam:   General Appearance:    Alert, cooperative, in no acute distress   Lungs:     Bilateral coarse inspiratory and  expiratory crackles.  No wheezing, Rhonic ++    Heart:    Irregular with tachycardia.  No murmur    Chest Wall:    No abnormalities observed   Abdomen:    Soft.  Positive bowel sounds.  No tenderness    Neuro:   Conscious, alert, oriented x3   Extremities:   Moves all extremities well, no edema, no cyanosis, no             Redness        Results Review:          Results from last 7 days  Lab Units 06/04/17  0427 06/02/17  0615 05/31/17  1842   SODIUM mmol/L 136 134* 131*   POTASSIUM mmol/L 3.2* 4.0 3.7   CHLORIDE mmol/L 92* 98 89*   TOTAL CO2 mmol/L 34.1* 25.2 25.6   BUN mg/dL 14 22 13   CREATININE mg/dL 0.57 0.52* 0.68   GLUCOSE mg/dL 92 121* 123*   CALCIUM mg/dL 8.6 9.0 8.9           Results from last 7 days  Lab Units  06/04/17  0427 06/02/17  0614 05/31/17  1842   WBC 10*3/mm3 8.34 20.62* 17.98*   HEMOGLOBIN g/dL 8.2* 7.5* 8.5*   HEMATOCRIT % 24.5* 22.8* 25.6*   PLATELETS 10*3/mm3 373 335 362             Medication Review:     cefepime 2 g Intravenous Q12H   dabigatran etexilate 150 mg Oral Q12H   fluconazole 100 mg Oral Q24H   ipratropium 0.5 mg Nebulization 4x Daily - RT   lactobacillus acidophilus 1 capsule Oral Daily   metoprolol tartrate 25 mg Oral Q12H   pantoprazole 40 mg Oral Q AM   torsemide 40 mg Oral Daily   verapamil  mg Oral Q24H         lactated ringers 1,000 mL Last Rate: Stopped (06/03/17 1150)   lactated ringers 30 mL/hr Last Rate: 0 mL/hr (06/02/17 1045)        Assessment:    1) Bilateral community acquired pneumonia  2) sepsis, 2ary to #1, improved  3) Acute on chronic hypoxic resp failure  4) Pulmonary interisital edema  5) A fib with RVR now  6) COPD, stable  7) Bronchiectasis with pulmonary MAC and aspergillus   8) Recurrent Clostridium difficile diarrhea  9) Hyponatremia, mild: Likely related to underlying lung disease    Plan:  - Antibiotics with cefepime, per ID.    - Diuresis per cardilogy  - Started on Metoprolol and Cardizem      Add Diflucan for thrush    Dr Tucker will follow in AM      Eva Squires MD  06/04/17  2:57 PM            EMR Dragon/Transcription disclaimer:   Much of this encounter note is an electronic transcription/translation of spoken language to printed text. The electronic translation of spoken language may permit erroneous, or at times, nonsensical words or phrases to be inadvertently transcribed; Although I have reviewed the note for such errors, some may still exist.

## 2017-06-04 NOTE — PLAN OF CARE
Problem: Patient Care Overview (Adult)  Goal: Plan of Care Review  Outcome: Ongoing (interventions implemented as appropriate)    06/04/17 8076   Coping/Psychosocial Response Interventions   Plan Of Care Reviewed With patient;kareem   Patient Care Overview   Progress improving   Outcome Evaluation   Outcome Summary/Follow up Plan Patient states she feels much better today and is regaining her strength back. Ambulating in room. Cough with production. No complaints of pain. No respiratory distress. Weaned patient off oxygen, room air. Potassium protocol initiated per MD, patient given two doses (40 meq) to replace, and lab will be rechecked this evening. VSS. Will continue to monitor.        Goal: Adult Individualization and Mutuality  Outcome: Ongoing (interventions implemented as appropriate)    Problem: Pneumonia (Adult)  Goal: Signs and Symptoms of Listed Potential Problems Will be Absent or Manageable (Pneumonia)  Outcome: Ongoing (interventions implemented as appropriate)    Problem: Fall Risk (Adult)  Goal: Absence of Falls  Outcome: Ongoing (interventions implemented as appropriate)

## 2017-06-04 NOTE — PROGRESS NOTES
Hospital Follow Up        Chief Complaint: Follow up afib    Interval History: Feeling a lot better today.     Objective:     Objective:  Temp:  [97.7 °F (36.5 °C)-97.9 °F (36.6 °C)] 97.7 °F (36.5 °C)  Heart Rate:  [] 77  Resp:  [16-18] 16  BP: ()/(63-88) 94/63     Intake/Output Summary (Last 24 hours) at 06/04/17 1202  Last data filed at 06/03/17 2334   Gross per 24 hour   Intake              640 ml   Output             1400 ml   Net             -760 ml     Body mass index is 22.13 kg/(m^2).  Last 3 weights    06/02/17  0500 06/02/17  0912 06/04/17  0539   Weight: 122 lb 12.8 oz (55.7 kg) 122 lb (55.3 kg) 124 lb 14.4 oz (56.7 kg)     Weight change: 2 lb 14.4 oz (1.315 kg)      Physical Exam:   General : Alert, cooperative, in no acute distress.  Neuro: alert,cooperative and oriented  Lungs: CTAB. Normal respiratory effort and rate.  CV:: irregularly, irregular, rate and rhythm, normal S1 and S2, no murmurs, gallops or rubs.  ABD: Soft, nontender, non-distended. positive bowel sounds  Extr: No edema or cyanosis, moves all extremities    Lab Review:     Results from last 7 days  Lab Units 06/04/17 0427 06/02/17  0615 05/31/17  1842   SODIUM mmol/L 136 134* 131*   POTASSIUM mmol/L 3.2* 4.0 3.7   CHLORIDE mmol/L 92* 98 89*   TOTAL CO2 mmol/L 34.1* 25.2 25.6   BUN mg/dL 14 22 13   CREATININE mg/dL 0.57 0.52* 0.68   GLUCOSE mg/dL 92 121* 123*   CALCIUM mg/dL 8.6 9.0 8.9   AST (SGOT) U/L  --   --  51*   ALT (SGPT) U/L  --   --  55*           Results from last 7 days  Lab Units 06/04/17  0427 06/02/17  0614   WBC 10*3/mm3 8.34 20.62*   HEMOGLOBIN g/dL 8.2* 7.5*   HEMATOCRIT % 24.5* 22.8*   PLATELETS 10*3/mm3 373 335       Results from last 7 days  Lab Units 05/31/17  1842   INR  1.96*               Results from last 7 days  Lab Units 06/02/17  0615 06/01/17  0604   PROBNP pg/mL 6238.0* 1707.0       Results from last 7 days  Lab Units 05/31/17  1842   TSH mIU/mL 1.630     I reviewed the patient's new  clinical results.  I personally viewed and interpreted the patient's EKG  Current Medications:   Scheduled Meds:  cefepime 2 g Intravenous Q12H   dabigatran etexilate 150 mg Oral Q12H   fluconazole 100 mg Oral Q24H   ipratropium 0.5 mg Nebulization 4x Daily - RT   lactobacillus acidophilus 1 capsule Oral Daily   metoprolol tartrate 25 mg Oral Q12H   pantoprazole 40 mg Oral Q AM   torsemide 40 mg Oral Daily   verapamil  mg Oral Q24H     Continuous Infusions:  lactated ringers 1,000 mL Last Rate: Stopped (06/03/17 1150)   lactated ringers 30 mL/hr Last Rate: 0 mL/hr (06/02/17 1045)       Allergies:  Allergies   Allergen Reactions   • Amlodipine Besylate Swelling   • Aspirin      Caused bleeding ulcers   • Bactrim [Sulfamethoxazole-Trimethoprim] Nausea And Vomiting   • Erythromycin    • Levaquin [Levofloxacin]    • Macrobid [Nitrofurantoin] Nausea Only   • Penicillins      Has tolerated ceftriaxone in the past   • Ramipril Other (See Comments)     Cough       Assessment/Plan:     1. Bilateral pneumonia: Status post bronchoscopy.   2. Acute/chronic respiratory failure  3. Chronic Atrial fibrillation: Rate controlled today.  Received dose of digoxin IV yesterday.  Remains on verapamil and metoprolol. On dabigatran for anticoagulation.   4. Acute/chronic diastolic CHF: Received IV furosemide yesterday. Back on po torsemide.  5. Hypertension  6. Non-obstructive CAD  7. Bronchiectasis with pulmonary MAC and aspergillus  8. Anemia: Improved today.   9. Hypokalemia: Being replaced.      -  Continue metoprolol tartrate and verapamil for rate control.   -  Will see again as needed while inpatient.  Please have her follow up with Dr. Robbins in 4 weeks following discharge.     Anayeli Christopher MD  06/04/17  12:02 PM

## 2017-06-05 LAB
BACTERIA SPEC AEROBE CULT: NORMAL
BACTERIA SPEC AEROBE CULT: NORMAL
GRAM STN SPEC: NORMAL

## 2017-06-05 PROCEDURE — 25010000002 CEFEPIME: Performed by: INTERNAL MEDICINE

## 2017-06-05 PROCEDURE — 94799 UNLISTED PULMONARY SVC/PX: CPT

## 2017-06-05 PROCEDURE — 99232 SBSQ HOSP IP/OBS MODERATE 35: CPT | Performed by: INTERNAL MEDICINE

## 2017-06-05 RX ADMIN — Medication 1 CAPSULE: at 09:38

## 2017-06-05 RX ADMIN — PANTOPRAZOLE SODIUM 40 MG: 40 TABLET, DELAYED RELEASE ORAL at 05:46

## 2017-06-05 RX ADMIN — IPRATROPIUM BROMIDE 0.5 MG: 0.5 SOLUTION RESPIRATORY (INHALATION) at 07:49

## 2017-06-05 RX ADMIN — DABIGATRAN ETEXILATE MESYLATE 150 MG: 150 CAPSULE ORAL at 21:01

## 2017-06-05 RX ADMIN — IPRATROPIUM BROMIDE 0.5 MG: 0.5 SOLUTION RESPIRATORY (INHALATION) at 15:38

## 2017-06-05 RX ADMIN — DABIGATRAN ETEXILATE MESYLATE 150 MG: 150 CAPSULE ORAL at 09:38

## 2017-06-05 RX ADMIN — IPRATROPIUM BROMIDE 0.5 MG: 0.5 SOLUTION RESPIRATORY (INHALATION) at 20:16

## 2017-06-05 RX ADMIN — TORSEMIDE 40 MG: 20 TABLET ORAL at 09:38

## 2017-06-05 RX ADMIN — FLUCONAZOLE 100 MG: 100 TABLET ORAL at 12:51

## 2017-06-05 RX ADMIN — METOPROLOL TARTRATE 25 MG: 25 TABLET ORAL at 09:38

## 2017-06-05 RX ADMIN — VERAPAMIL HYDROCHLORIDE 180 MG: 180 TABLET, FILM COATED, EXTENDED RELEASE ORAL at 09:38

## 2017-06-05 RX ADMIN — METOPROLOL TARTRATE 25 MG: 25 TABLET ORAL at 21:01

## 2017-06-05 RX ADMIN — CEFEPIME 2 G: 2 INJECTION, POWDER, FOR SOLUTION INTRAVENOUS at 12:51

## 2017-06-05 NOTE — PLAN OF CARE
Problem: Patient Care Overview (Adult)  Goal: Plan of Care Review  Outcome: Ongoing (interventions implemented as appropriate)    06/05/17 0436 06/05/17 1837   Coping/Psychosocial Response Interventions   Plan Of Care Reviewed With patient --    Patient Care Overview   Progress improving --    Outcome Evaluation   Outcome Summary/Follow up Plan --  pt denies pain, no soa, denies nausea, cough present, pt stable       Goal: Adult Individualization and Mutuality  Outcome: Ongoing (interventions implemented as appropriate)    Problem: Pneumonia (Adult)  Goal: Signs and Symptoms of Listed Potential Problems Will be Absent or Manageable (Pneumonia)  Outcome: Ongoing (interventions implemented as appropriate)    Problem: Fall Risk (Adult)  Goal: Absence of Falls  Outcome: Ongoing (interventions implemented as appropriate)

## 2017-06-05 NOTE — PROGRESS NOTES
" LOS: 5 days     Chief Complaint:  Follow-up cough    Interval History:  Afib with RVR continues. She says that her breathing is markedly improved since her bronch where much mucus was removed. She still has a \"dry hacking cough\" but feels like she is getting more sputum out. She is not febrile. She is tolerating antibiotics w/o rash or diarrhea. Her stool is actually formed.    ROS: no n/v; no CP    Vital Signs  Temp:  [97.7 °F (36.5 °C)-98.1 °F (36.7 °C)] 97.8 °F (36.6 °C)  Heart Rate:  [] 119  Resp:  [16-20] 20  BP: ()/(63-83) 122/83    Physical Exam:  General: awake, alert, looks better today  Head: Normocephalic  Eyes: R eye ptosis, PERRL, no scleral icterus  ENT: MMM, OP clear, no thrush.   Neck: Supple  Cardiovascular: tachycardic, irreg irreg, no murmurs, rubs, or gallops; no LE edema  Respiratory: Lungs with improved rales in BLL; faint wheeze; normal WOB on RA  GI: Abdomen is soft, mild TTP, mildly distended  : no Long catheter present  Musculoskeletal: no joint abnormalities, normal musculature  Skin: No rashes, lesions, or embolic phenomenon  Neurological: Alert and oriented x 3, motor strength 4/5 in all four extremities  Psychiatric: Normal mood and affect   Vasc: no cyanosis; PIV w/o erythema    Antibiotics:  •  cefepime (MAXIPIME) 2 g/100 mL 0.9% NS (mbp), 2 g, Intravenous, Q12H, Pravin Jackson MD, 2 g at 06/02/17 0651    LABS:  CBC, BMP, micro reviewed today  Lab Results   Component Value Date    WBC 8.34 06/04/2017    HGB 8.2 (L) 06/04/2017    HCT 24.5 (L) 06/04/2017    MCV 93.5 06/04/2017     06/04/2017     Lab Results   Component Value Date    GLUCOSE 92 06/04/2017    BUN 14 06/04/2017    CREATININE 0.57 06/04/2017    EGFRIFNONA 101 06/04/2017    BCR 24.6 06/04/2017    CO2 34.1 (H) 06/04/2017    CALCIUM 8.6 06/04/2017    ALBUMIN 3.50 05/31/2017    LABIL2 0.9 05/31/2017    AST 51 (H) 05/31/2017    ALT 55 (H) 05/31/2017     BNP 6238  LA 0.8  procal 0.18    Microbiology:  5/31 " BCx: NGTD  5/31 RVP: negative  5/31 Sputum Cx: normal fortino  6/3 bronch culture: mixed fortino  6/3 bronch AFB: pending  6/3 bronch fungal: rare fungal elements on gram stain     Radiology (personally reviewed):   No new imaging today    Assessment/Plan   1. 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 Bilateral pneumonia  3. Recurrent Clostridium difficile diarrhea - no current diarrhea  4. Chronic diastolic heart failure  5. Afib with rapid ventricular response     I think she is has an exacerbation of her bronchiectasis and her CT does appear worse in terms of nodules so it seems her MAC has made some progression. I also think there could be some component of superimposed typical bacterial infection at this time. She is much improved after bronchoscopy on 6/3/17. Cultures are pending. I  plan to treat with 7 days of cefepime (STOP DATE 6/7/17) and then plan to see her in my clinic on 6/12/17 to start MAC therapy. If she isn't improving a few months into therapy then would consider adding Aspergillus coverage. In the meantime, I recommend bronchiectasis treatment and airway clearance measures as RX'ed by pulmonary. I am not opposed to further steroids (received 1 dose in ER and WBC up today). Cardiology is now following her for the Afib with RVR and elevated BNP.      Thank you for this consult. ID will follow. I have discussed this case with Dr Tucker.

## 2017-06-05 NOTE — PLAN OF CARE
Problem: Patient Care Overview (Adult)  Goal: Plan of Care Review  Outcome: Ongoing (interventions implemented as appropriate)    06/05/17 0436   Coping/Psychosocial Response Interventions   Plan Of Care Reviewed With patient   Patient Care Overview   Progress improving   Outcome Evaluation   Outcome Summary/Follow up Plan pt states no pain, no SOA, vitals WNL, O2 @ HS per home routine-2L, potassium 4.1, standby assist for ambulation, cough non productive, still awaiting culture results from Bronch wash         06/05/17 0436   Coping/Psychosocial Response Interventions   Plan Of Care Reviewed With patient   Patient Care Overview   Progress improving   Outcome Evaluation   Outcome Summary/Follow up Plan pt states no pain, no SOA, vitals WNL, O2 @ HS per home routine-2L, potassium 4.1, standby assist for ambulation, cough non productive, still awaiting culture results from Bronch wash       Goal: Adult Individualization and Mutuality  Outcome: Ongoing (interventions implemented as appropriate)    Problem: Pneumonia (Adult)  Goal: Signs and Symptoms of Listed Potential Problems Will be Absent or Manageable (Pneumonia)  Outcome: Ongoing (interventions implemented as appropriate)    Problem: Fall Risk (Adult)  Goal: Absence of Falls  Outcome: Ongoing (interventions implemented as appropriate)    Problem: GI Endoscopy (Adult)  Goal: Signs and Symptoms of Listed Potential Problems Will be Absent or Manageable (GI Endoscopy)  Outcome: Ongoing (interventions implemented as appropriate)

## 2017-06-05 NOTE — PROGRESS NOTES
[facility]  Phone: [phone]  Rogelio Tucker MD    Subjective:  LOS: 5    She continues to have cough and finds it troublesome. Overall improved after bronch.    Objective   Vital Signs past 24hrs  BP range: BP: ()/(54-83) 128/80  Pulse range: Heart Rate:  [] 89  Resp rate range: Resp:  [16-20] 20  Temp range: Temp (24hrs), Av °F (36.7 °C), Min:97.8 °F (36.6 °C), Max:98.1 °F (36.7 °C)    O2 Device: room airFlow (L/min):  [2] 2  Oxygen range:SpO2:  [90 %-99 %] 99 %   124 lb 14.4 oz (56.7 kg); Body mass index is 22.13 kg/(m^2).    Intake/Output Summary (Last 24 hours) at 17 1922  Last data filed at 17 1301   Gross per 24 hour   Intake              330 ml   Output             2650 ml   Net            -2320 ml       Physical Exam   Constitutional: She appears well-developed.   HENT:   Head: Normocephalic and atraumatic.   Eyes: Pupils are equal, round, and reactive to light.   Neck: Neck supple. No JVD present.   Cardiovascular: Normal rate and regular rhythm.    No murmur heard.  Pulmonary/Chest: Effort normal. No respiratory distress. She has decreased breath sounds. She has no wheezes. She has rales (fine - left lung, few on right).   Abdominal: Soft. Bowel sounds are normal. She exhibits no mass. There is no tenderness.   Musculoskeletal: She exhibits no edema.   Neurological: She is alert.   Skin: Skin is warm. No rash noted.   Vitals reviewed.    Results Review:    I have reviewed the laboratory and imaging data since the last note by [facility] physician.  My annotations are noted in assessment and plan.    Medication Review:  I have reviewed the current MAR.  My annotations are noted in assessment and plan.      lactated ringers 1,000 mL Last Rate: Stopped (17 1150)   lactated ringers 30 mL/hr Last Rate: 0 mL/hr (17 1045)     Plan   PCCM Problems  1) Bilateral community acquired pneumonia  2) sepsis, 2ary to #1, improved  3) Acute on chronic hypoxic resp  failure  4) Pulmonary interisital edema  5) A fib with RVR now  6) COPD, stable  7) Bronchiectasis with pulmonary MAC and aspergillus   8) Recurrent Clostridium difficile diarrhea  9) Hyponatremia, mild: Likely related to underlying lung disease    Plan of Treatment  Discussed with ID.  Finish out cefepime then discharged.  We will see in office and decide on KINA treatment.    I believe our office is trying to arrange a VEST for this patient.    Appreciate cardiology help with afib and CHF.    Home 6/7 hopefully.    Rogelio Tucker MD  06/05/17  7:22 PM    EMR Dragon/Transcription disclaimer:   Some of this note may be an electronic transcription/translation of spoken language to printed text. The electronic translation of spoken language may permit erroneous, or at times, nonsensical words or phrases to be inadvertently transcribed; Although I have reviewed the note for such errors, some may still exist.

## 2017-06-06 LAB
ANION GAP SERPL CALCULATED.3IONS-SCNC: 8.5 MMOL/L
BUN BLD-MCNC: 12 MG/DL (ref 8–23)
BUN/CREAT SERPL: 20.3 (ref 7–25)
CALCIUM SPEC-SCNC: 9.2 MG/DL (ref 8.6–10.5)
CHLORIDE SERPL-SCNC: 91 MMOL/L (ref 98–107)
CO2 SERPL-SCNC: 33.5 MMOL/L (ref 22–29)
CREAT BLD-MCNC: 0.59 MG/DL (ref 0.57–1)
CYTO UR: NORMAL
GFR SERPL CREATININE-BSD FRML MDRD: 97 ML/MIN/1.73
GIE STN SPEC: NORMAL
GLUCOSE BLD-MCNC: 101 MG/DL (ref 65–99)
LAB AP CASE REPORT: NORMAL
Lab: NORMAL
NT-PROBNP SERPL-MCNC: 1857 PG/ML (ref 0–1800)
PATH REPORT.FINAL DX SPEC: NORMAL
PATH REPORT.GROSS SPEC: NORMAL
POTASSIUM BLD-SCNC: 4.2 MMOL/L (ref 3.5–5.2)
SODIUM BLD-SCNC: 133 MMOL/L (ref 136–145)

## 2017-06-06 PROCEDURE — 94799 UNLISTED PULMONARY SVC/PX: CPT

## 2017-06-06 PROCEDURE — 80048 BASIC METABOLIC PNL TOTAL CA: CPT | Performed by: INTERNAL MEDICINE

## 2017-06-06 PROCEDURE — 83880 ASSAY OF NATRIURETIC PEPTIDE: CPT | Performed by: INTERNAL MEDICINE

## 2017-06-06 PROCEDURE — 99232 SBSQ HOSP IP/OBS MODERATE 35: CPT | Performed by: INTERNAL MEDICINE

## 2017-06-06 PROCEDURE — 25010000002 CEFEPIME: Performed by: INTERNAL MEDICINE

## 2017-06-06 RX ADMIN — CEFEPIME 2 G: 2 INJECTION, POWDER, FOR SOLUTION INTRAVENOUS at 00:14

## 2017-06-06 RX ADMIN — IPRATROPIUM BROMIDE 0.5 MG: 0.5 SOLUTION RESPIRATORY (INHALATION) at 15:54

## 2017-06-06 RX ADMIN — DABIGATRAN ETEXILATE MESYLATE 150 MG: 150 CAPSULE ORAL at 08:11

## 2017-06-06 RX ADMIN — DABIGATRAN ETEXILATE MESYLATE 150 MG: 150 CAPSULE ORAL at 20:30

## 2017-06-06 RX ADMIN — Medication 1 CAPSULE: at 08:11

## 2017-06-06 RX ADMIN — IPRATROPIUM BROMIDE 0.5 MG: 0.5 SOLUTION RESPIRATORY (INHALATION) at 21:12

## 2017-06-06 RX ADMIN — PANTOPRAZOLE SODIUM 40 MG: 40 TABLET, DELAYED RELEASE ORAL at 06:40

## 2017-06-06 RX ADMIN — IPRATROPIUM BROMIDE 0.5 MG: 0.5 SOLUTION RESPIRATORY (INHALATION) at 07:45

## 2017-06-06 RX ADMIN — CEFEPIME 2 G: 2 INJECTION, POWDER, FOR SOLUTION INTRAVENOUS at 14:43

## 2017-06-06 RX ADMIN — IPRATROPIUM BROMIDE 0.5 MG: 0.5 SOLUTION RESPIRATORY (INHALATION) at 11:37

## 2017-06-06 RX ADMIN — METOPROLOL TARTRATE 25 MG: 25 TABLET ORAL at 20:30

## 2017-06-06 RX ADMIN — FLUCONAZOLE 100 MG: 100 TABLET ORAL at 13:34

## 2017-06-06 RX ADMIN — ACETAMINOPHEN 650 MG: 325 TABLET ORAL at 00:20

## 2017-06-06 RX ADMIN — CEFEPIME 2 G: 2 INJECTION, POWDER, FOR SOLUTION INTRAVENOUS at 23:53

## 2017-06-06 RX ADMIN — TORSEMIDE 40 MG: 20 TABLET ORAL at 08:11

## 2017-06-06 RX ADMIN — VERAPAMIL HYDROCHLORIDE 180 MG: 180 TABLET, FILM COATED, EXTENDED RELEASE ORAL at 08:11

## 2017-06-06 RX ADMIN — ACETAMINOPHEN 650 MG: 325 TABLET ORAL at 23:53

## 2017-06-06 RX ADMIN — METOPROLOL TARTRATE 25 MG: 25 TABLET ORAL at 08:11

## 2017-06-06 NOTE — PLAN OF CARE
Problem: Patient Care Overview (Adult)  Goal: Plan of Care Review  Outcome: Ongoing (interventions implemented as appropriate)    06/06/17 0454 06/06/17 1825   Coping/Psychosocial Response Interventions   Plan Of Care Reviewed With patient --    Patient Care Overview   Progress improving --    Outcome Evaluation   Outcome Summary/Follow up Plan --  pt denies soa, denies pain, tolerating meals, denies nausea, iv abx given, up to chair most of day, pt stable       Goal: Adult Individualization and Mutuality  Outcome: Ongoing (interventions implemented as appropriate)    Problem: Pneumonia (Adult)  Goal: Signs and Symptoms of Listed Potential Problems Will be Absent or Manageable (Pneumonia)  Outcome: Ongoing (interventions implemented as appropriate)    Problem: Fall Risk (Adult)  Goal: Absence of Falls  Outcome: Ongoing (interventions implemented as appropriate)

## 2017-06-06 NOTE — PROGRESS NOTES
LOS: 6 days     Chief Complaint:  Follow-up cough    Interval History:  HR better controlled today though not normalize. She denies shortness of air and is on room air. She is still having some cough and brining up sputum. She has no fevers. Tolerating cefepime w/o rash or diarrhea. Her stools are formed.    ROS: no n/v; no CP    Vital Signs  Temp:  [97.5 °F (36.4 °C)-98.1 °F (36.7 °C)] 98.1 °F (36.7 °C)  Heart Rate:  [] 107  Resp:  [16-20] 18  BP: ()/(54-84) 118/80    Physical Exam:  General: awake, alert, looks better each day  Head: Normocephalic  Eyes: R eye ptosis, PERRL, no scleral icterus  ENT: MMM, OP clear, no thrush.   Neck: Supple  Cardiovascular: mildly tachycardic, irreg irreg, no murmurs, rubs, or gallops; no LE edema  Respiratory: Lungs with improved rales in BLL; faint wheeze; normal WOB on RA  GI: Abdomen is soft, mild TTP, mildly distended  : no Long catheter present  Musculoskeletal: no joint abnormalities, normal musculature  Skin: No rashes, lesions, or embolic phenomenon  Neurological: Alert and oriented x 3, motor strength 4/5 in all four extremities  Psychiatric: Normal mood and affect   Vasc: no cyanosis; PIV w/o erythema    Antibiotics:  •  cefepime (MAXIPIME) 2 g/100 mL 0.9% NS (mbp), 2 g, Intravenous, Q12H, Pravin Jackson MD, 2 g at 06/02/17 0651    LABS:  CBC, BMP, micro reviewed today  Lab Results   Component Value Date    WBC 8.34 06/04/2017    HGB 8.2 (L) 06/04/2017    HCT 24.5 (L) 06/04/2017    MCV 93.5 06/04/2017     06/04/2017     Lab Results   Component Value Date    GLUCOSE 101 (H) 06/06/2017    BUN 12 06/06/2017    CREATININE 0.59 06/06/2017    EGFRIFNONA 97 06/06/2017    BCR 20.3 06/06/2017    CO2 33.5 (H) 06/06/2017    CALCIUM 9.2 06/06/2017    ALBUMIN 3.50 05/31/2017    LABIL2 0.9 05/31/2017    AST 51 (H) 05/31/2017    ALT 55 (H) 05/31/2017     BNP 6238--->1857  LA 0.8  procal 0.18    Microbiology:  5/31 BCx: NGTD  5/31 RVP: negative  5/31 Sputum Cx:  normal fortino  6/3 bronch culture: mixed fortino  6/3 bronch AFB: pending  6/3 bronch fungal: rare fungal elements on gram stain     Radiology (personally reviewed):   No new imaging today    Assessment/Plan   1. 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 Bilateral pneumonia  3. Recurrent Clostridium difficile diarrhea - no current diarrhea  4. Chronic diastolic heart failure  5. Afib with rapid ventricular response     I think she is had an exacerbation of her bronchiectasis and her CT did appear worse in terms of nodules so it seems her MAC has made some progression. I also think there could be some component of superimposed typical bacterial infection leading to the exacerbation. She has responded well to a course of cefepime, bronchoscopy with mucus removal, and supportive care. She will finish her cefepime tomorrow and can be discharged around noon or later.    Going forward, I will see her in clinic week and likely start MAC therapy. I think this is the pathogen rather than the Aspergillus seen on prior bronch. She has no evidence of an Aspergilloma and her symptoms do not fit with ABPA.    I spoke with Dr Tucker about bronchiectasis therapy and there are plans to try to maximize that as well.    Thank you for allowing me to be involved in the care of this patient. Infectious diseases will sign off at this time with antibiotics plan in place but please call me at [phone] if any further questions.

## 2017-06-06 NOTE — PLAN OF CARE
Problem: Patient Care Overview (Adult)  Goal: Plan of Care Review  Outcome: Ongoing (interventions implemented as appropriate)    06/06/17 0454   Coping/Psychosocial Response Interventions   Plan Of Care Reviewed With patient   Patient Care Overview   Progress improving   Outcome Evaluation   Outcome Summary/Follow up Plan pt states no pain this shift/no Soa, no nausea, cough continues-non [id number], iv antibiotics continues, cultures not resutlted , uses some language for comunication,        Goal: Adult Individualization and Mutuality  Outcome: Ongoing (interventions implemented as appropriate)    Problem: Pneumonia (Adult)  Goal: Signs and Symptoms of Listed Potential Problems Will be Absent or Manageable (Pneumonia)  Outcome: Ongoing (interventions implemented as appropriate)    Problem: Fall Risk (Adult)  Goal: Absence of Falls  Outcome: Ongoing (interventions implemented as appropriate)

## 2017-06-06 NOTE — PROGRESS NOTES
[facility]  Phone: [phone]  Rogelio Tucker MD    Subjective:  LOS: 6    She still has a cough.  Otherwise no complaints.  Eager to leave tomorrow.    Objective   Vital Signs past 24hrs  BP range: BP: ()/(59-84) 99/59  Pulse range: Heart Rate:  [] 84  Resp rate range: Resp:  [16-20] 18  Temp range: Temp (24hrs), Av.9 °F (36.6 °C), Min:97.5 °F (36.4 °C), Max:98.1 °F (36.7 °C)    O2 Device: room airFlow (L/min):  [2] 2  Oxygen range:SpO2:  [92 %-99 %] 94 %   124 lb 14.4 oz (56.7 kg); Body mass index is 22.13 kg/(m^2).    Intake/Output Summary (Last 24 hours) at 17 1613  Last data filed at 17 1247   Gross per 24 hour   Intake              350 ml   Output             1825 ml   Net            -1475 ml       Physical Exam   Constitutional: She appears well-developed.   HENT:   Head: Normocephalic and atraumatic.   Eyes: Pupils are equal, round, and reactive to light.   Neck: Neck supple. No JVD present.   Cardiovascular: Normal rate and regular rhythm.    No murmur heard.  Pulmonary/Chest: Effort normal. No respiratory distress. She has decreased breath sounds. She has no wheezes. She has rales (fine - left lung, few on right).   Abdominal: Soft. Bowel sounds are normal. She exhibits no mass. There is no tenderness.   Musculoskeletal: She exhibits no edema.   Neurological: She is alert.   Skin: Skin is warm. No rash noted.   Vitals reviewed.    Results Review:    I have reviewed the laboratory and imaging data since the last note by [facility] physician.  My annotations are noted in assessment and plan.    Medication Review:  I have reviewed the current MAR.  My annotations are noted in assessment and plan.      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  Finish out cefepime then discharge 6/7.      They will see in office and decide on KINA treatment.    I believe our office is trying to arrange a VEST for this patient.    Appreciate cardiology help with afib and CHF.    Home 6/7 hopefully.    Rogelio Tucker MD  06/06/17  4:13 PM    EMR Dragon/Transcription disclaimer:   Some of this note may be an electronic transcription/translation of spoken language to printed text. The electronic translation of spoken language may permit erroneous, or at times, nonsensical words or phrases to be inadvertently transcribed; Although I have reviewed the note for such errors, some may still exist.

## 2017-06-07 LAB
ANION GAP SERPL CALCULATED.3IONS-SCNC: 9.6 MMOL/L
BUN BLD-MCNC: 13 MG/DL (ref 8–23)
BUN/CREAT SERPL: 22.4 (ref 7–25)
CALCIUM SPEC-SCNC: 9.4 MG/DL (ref 8.6–10.5)
CHLORIDE SERPL-SCNC: 91 MMOL/L (ref 98–107)
CO2 SERPL-SCNC: 31.4 MMOL/L (ref 22–29)
CREAT BLD-MCNC: 0.58 MG/DL (ref 0.57–1)
DEPRECATED RDW RBC AUTO: 57.4 FL (ref 37–54)
ERYTHROCYTE [DISTWIDTH] IN BLOOD BY AUTOMATED COUNT: 16.2 % (ref 11.7–13)
GFR SERPL CREATININE-BSD FRML MDRD: 99 ML/MIN/1.73
GLUCOSE BLD-MCNC: 107 MG/DL (ref 65–99)
HCT VFR BLD AUTO: 27.4 % (ref 35.6–45.5)
HGB BLD-MCNC: 8.9 G/DL (ref 11.9–15.5)
MCH RBC QN AUTO: 31.1 PG (ref 26.9–32)
MCHC RBC AUTO-ENTMCNC: 32.5 G/DL (ref 32.4–36.3)
MCV RBC AUTO: 95.8 FL (ref 80.5–98.2)
PLATELET # BLD AUTO: 393 10*3/MM3 (ref 140–500)
PMV BLD AUTO: 9.3 FL (ref 6–12)
POTASSIUM BLD-SCNC: 3.9 MMOL/L (ref 3.5–5.2)
RBC # BLD AUTO: 2.86 10*6/MM3 (ref 3.9–5.2)
SODIUM BLD-SCNC: 132 MMOL/L (ref 136–145)
WBC NRBC COR # BLD: 11.24 10*3/MM3 (ref 4.5–10.7)

## 2017-06-07 PROCEDURE — 25010000002 DIGOXIN PER 500 MCG: Performed by: INTERNAL MEDICINE

## 2017-06-07 PROCEDURE — 25010000002 CEFEPIME: Performed by: INTERNAL MEDICINE

## 2017-06-07 PROCEDURE — 94640 AIRWAY INHALATION TREATMENT: CPT

## 2017-06-07 PROCEDURE — 80048 BASIC METABOLIC PNL TOTAL CA: CPT | Performed by: INTERNAL MEDICINE

## 2017-06-07 PROCEDURE — 99232 SBSQ HOSP IP/OBS MODERATE 35: CPT | Performed by: INTERNAL MEDICINE

## 2017-06-07 PROCEDURE — 94799 UNLISTED PULMONARY SVC/PX: CPT

## 2017-06-07 PROCEDURE — 85027 COMPLETE CBC AUTOMATED: CPT | Performed by: INTERNAL MEDICINE

## 2017-06-07 RX ORDER — DIGOXIN 125 MCG
125 TABLET ORAL
Status: DISCONTINUED | OUTPATIENT
Start: 2017-06-08 | End: 2017-06-09 | Stop reason: HOSPADM

## 2017-06-07 RX ORDER — DIGOXIN 0.25 MG/ML
250 INJECTION INTRAMUSCULAR; INTRAVENOUS EVERY 8 HOURS
Status: COMPLETED | OUTPATIENT
Start: 2017-06-07 | End: 2017-06-07

## 2017-06-07 RX ORDER — VERAPAMIL HYDROCHLORIDE 240 MG/1
240 TABLET, FILM COATED, EXTENDED RELEASE ORAL
Status: DISCONTINUED | OUTPATIENT
Start: 2017-06-08 | End: 2017-06-09 | Stop reason: HOSPADM

## 2017-06-07 RX ADMIN — DABIGATRAN ETEXILATE MESYLATE 150 MG: 150 CAPSULE ORAL at 20:59

## 2017-06-07 RX ADMIN — PANTOPRAZOLE SODIUM 40 MG: 40 TABLET, DELAYED RELEASE ORAL at 06:10

## 2017-06-07 RX ADMIN — DIGOXIN 250 MCG: 0.25 INJECTION INTRAMUSCULAR; INTRAVENOUS at 21:00

## 2017-06-07 RX ADMIN — VERAPAMIL HYDROCHLORIDE 180 MG: 180 TABLET, FILM COATED, EXTENDED RELEASE ORAL at 08:49

## 2017-06-07 RX ADMIN — CEFEPIME 2 G: 2 INJECTION, POWDER, FOR SOLUTION INTRAVENOUS at 12:08

## 2017-06-07 RX ADMIN — TORSEMIDE 40 MG: 20 TABLET ORAL at 08:49

## 2017-06-07 RX ADMIN — FLUCONAZOLE 100 MG: 100 TABLET ORAL at 12:08

## 2017-06-07 RX ADMIN — METOPROLOL TARTRATE 25 MG: 25 TABLET ORAL at 08:49

## 2017-06-07 RX ADMIN — IPRATROPIUM BROMIDE 0.5 MG: 0.5 SOLUTION RESPIRATORY (INHALATION) at 20:18

## 2017-06-07 RX ADMIN — DABIGATRAN ETEXILATE MESYLATE 150 MG: 150 CAPSULE ORAL at 08:49

## 2017-06-07 RX ADMIN — Medication 1 CAPSULE: at 08:49

## 2017-06-07 RX ADMIN — DIGOXIN 250 MCG: 0.25 INJECTION INTRAMUSCULAR; INTRAVENOUS at 12:08

## 2017-06-07 RX ADMIN — ACETAMINOPHEN 650 MG: 325 TABLET ORAL at 20:59

## 2017-06-07 RX ADMIN — IPRATROPIUM BROMIDE 0.5 MG: 0.5 SOLUTION RESPIRATORY (INHALATION) at 11:24

## 2017-06-07 NOTE — PLAN OF CARE
Problem: Patient Care Overview (Adult)  Goal: Plan of Care Review  Outcome: Ongoing (interventions implemented as appropriate)    06/07/17 1853   Coping/Psychosocial Response Interventions   Plan Of Care Reviewed With patient   Patient Care Overview   Progress progress toward functional goals as expected   Outcome Evaluation   Outcome Summary/Follow up Plan VSS. tele AFIB; IV dig loading. Hold metoprolol. no pain. friends visitied.         Problem: Pneumonia (Adult)  Goal: Signs and Symptoms of Listed Potential Problems Will be Absent or Manageable (Pneumonia)  Outcome: Ongoing (interventions implemented as appropriate)    Problem: Fall Risk (Adult)  Goal: Absence of Falls  Outcome: Ongoing (interventions implemented as appropriate)    06/07/17 1853   Fall Risk (Adult)   Absence of Falls making progress toward outcome         Problem: GI Endoscopy (Adult)  Goal: Signs and Symptoms of Listed Potential Problems Will be Absent or Manageable (GI Endoscopy)  Outcome: Ongoing (interventions implemented as appropriate)

## 2017-06-07 NOTE — PLAN OF CARE
Problem: Patient Care Overview (Adult)  Goal: Plan of Care Review  Outcome: Ongoing (interventions implemented as appropriate)    06/07/17 0435   Coping/Psychosocial Response Interventions   Plan Of Care Reviewed With patient   Patient Care Overview   Progress improving   Outcome Evaluation   Outcome Summary/Follow up Plan pt states no pain, no SOA, iv antibioticsgiven, up to chair this shift, vitals Stable, 2L O2 @ hs, pt hopes to discharge in am       Goal: Adult Individualization and Mutuality  Outcome: Ongoing (interventions implemented as appropriate)    Problem: Pneumonia (Adult)  Goal: Signs and Symptoms of Listed Potential Problems Will be Absent or Manageable (Pneumonia)  Outcome: Ongoing (interventions implemented as appropriate)    Problem: Fall Risk (Adult)  Goal: Absence of Falls  Outcome: Ongoing (interventions implemented as appropriate)

## 2017-06-07 NOTE — PROGRESS NOTES
[facility]  Phone: [phone]  Rogelio Tucker MD    Subjective:  LOS: 7    She still has a cough.  Otherwise no complaints.  Developed afib yesterday with rvr. Now belen but still in fib.    Objective   Vital Signs past 24hrs  BP range: BP: ()/(43-85) 90/43  Pulse range: Heart Rate:  [] 80  Resp rate range: Resp:  [14-20] 20  Temp range: Temp (24hrs), Av.7 °F (36.5 °C), Min:97.5 °F (36.4 °C), Max:98 °F (36.7 °C)    O2 Device: room airFlow (L/min):  [2] 2  Oxygen range:SpO2:  [92 %-98 %] 93 %   124 lb 14.4 oz (56.7 kg); Body mass index is 22.13 kg/(m^2).    Intake/Output Summary (Last 24 hours) at 17 1740  Last data filed at 17 1300   Gross per 24 hour   Intake              805 ml   Output             1000 ml   Net             -195 ml       Physical Exam   Constitutional: She appears well-developed.   HENT:   Head: Normocephalic and atraumatic.   Eyes: Pupils are equal, round, and reactive to light.   Neck: Neck supple. No JVD present.   Cardiovascular: Normal rate and regular rhythm.    No murmur heard.  Pulmonary/Chest: Effort normal. No respiratory distress. She has decreased breath sounds. She has no wheezes. She has rales in the right lower field and the left lower field.   Abdominal: Soft. Bowel sounds are normal. She exhibits no mass. There is no tenderness.   Musculoskeletal: She exhibits no edema.   Neurological: She is alert.   Skin: Skin is warm. No rash noted.   Vitals reviewed.    Results Review:    I have reviewed the laboratory and imaging data since the last note by [facility] physician.  My annotations are noted in assessment and plan.    Medication Review:  I have reviewed the current MAR.  My annotations are noted in assessment and plan.      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    Now has a VEST at home.    Appreciate cardiology help with afib and CHF. Plan is Digoxin and higher dose of Diltiazem.    Home when ok with cards.    Rogelio Tucker MD  06/07/17  5:40 PM    EMR Dragon/Transcription disclaimer:   Some of this note may be an electronic transcription/translation of spoken language to printed text. The electronic translation of spoken language may permit erroneous, or at times, nonsensical words or phrases to be inadvertently transcribed; Although I have reviewed the note for such errors, some may still exist.

## 2017-06-07 NOTE — PROGRESS NOTES
"Patient Name: Agnieszka Rizzo  :1930  86 y.o.      Patient Care Team:  Matt Rose MD as PCP - General (Family Medicine)  Marcie Robbins MD as Consulting Physician (Cardiology)  Nilesh Danielle MD as Consulting Physician (Urology)    Interval History:   She has been adamantly tachycardic since she's been here    Subjective:  Following for A. fib with RVR     Objective   Vital Signs  Temp:  [97.5 °F (36.4 °C)-98 °F (36.7 °C)] 97.5 °F (36.4 °C)  Heart Rate:  [] 146  Resp:  [18-20] 18  BP: ()/(57-85) 95/85    Intake/Output Summary (Last 24 hours) at 17 0929  Last data filed at 17 0902   Gross per 24 hour   Intake              585 ml   Output             1100 ml   Net             -515 ml     Flowsheet Rows         First Filed Value    Admission Height  61\" (154.9 cm) Documented at 2017 1745    Admission Weight  117 lb (53.1 kg) Documented at 2017 1745          Physical Exam:   General Appearance:    Alert, cooperative, in no acute distress   Lungs:     Clear to auscultation.  Normal respiratory effort and rate.      Heart:    Irregular and tachycardic.  I checked her blood pressure and it was 110/60.     Chest Wall:    No abnormalities observed   Abdomen:     Soft, nontender, positive bowel sounds.     Extremities:   no cyanosis, clubbing or edema.  No marked joint deformities.  Adequate musculoskeletal strength.       Results Review:      Results from last 7 days  Lab Units 17  0540   SODIUM mmol/L 132*   POTASSIUM mmol/L 3.9   CHLORIDE mmol/L 91*   TOTAL CO2 mmol/L 31.4*   BUN mg/dL 13   CREATININE mg/dL 0.58   GLUCOSE mg/dL 107*   CALCIUM mg/dL 9.4           Results from last 7 days  Lab Units 17  0540   WBC 10*3/mm3 11.24*   HEMOGLOBIN g/dL 8.9*   HEMATOCRIT % 27.4*   PLATELETS 10*3/mm3 393       Results from last 7 days  Lab Units 17  1842   INR  1.96*                     Medication Review:     cefepime 2 g Intravenous Q12H   dabigatran etexilate " 150 mg Oral Q12H   fluconazole 100 mg Oral Q24H   ipratropium 0.5 mg Nebulization 4x Daily - RT   lactobacillus acidophilus 1 capsule Oral Daily   metoprolol tartrate 25 mg Oral Q12H   pantoprazole 40 mg Oral Q AM   torsemide 40 mg Oral Daily   verapamil  mg Oral Q24H          lactated ringers 1,000 mL Last Rate: Stopped (06/03/17 1150)   lactated ringers 30 mL/hr Last Rate: 0 mL/hr (06/02/17 1045)       Assessment/Plan     1.  Pneumonia  2.  C. difficile.  No current diarrhea.  3.  Atrial fibrillation.  She failed cardioversion in January 2017.  She has CHADS2-Vasc score 5.  She is anticoagulated with Pradaxa.  She is currently on metoprolol tartrate 25 mg twice a day and verapamil 180 mg a day.  As an outpatient, she was on verapamil 240 mg a day.  4.  Chronic diastolic heart failure.  She is on torsemide 40 mg a day.  5.  Nonobstructive coronary artery disease  6.  Hypertension  7.  Hyperlipidemia  8.  Hyponatremia    - I am going to stop metoprolol.  I'm going to increase her verapamil.  I'm going to load her with digoxin and start oral digoxin tomorrow    Marcie Robbins MD, [facility]  06/07/17  9:29 AM

## 2017-06-08 PROCEDURE — 25010000002 CEFEPIME: Performed by: INTERNAL MEDICINE

## 2017-06-08 PROCEDURE — 94799 UNLISTED PULMONARY SVC/PX: CPT

## 2017-06-08 PROCEDURE — 93005 ELECTROCARDIOGRAM TRACING: CPT | Performed by: INTERNAL MEDICINE

## 2017-06-08 PROCEDURE — 93010 ELECTROCARDIOGRAM REPORT: CPT | Performed by: INTERNAL MEDICINE

## 2017-06-08 RX ADMIN — ACETAMINOPHEN 650 MG: 325 TABLET ORAL at 18:19

## 2017-06-08 RX ADMIN — ACETAMINOPHEN 650 MG: 325 TABLET ORAL at 22:22

## 2017-06-08 RX ADMIN — DABIGATRAN ETEXILATE MESYLATE 150 MG: 150 CAPSULE ORAL at 20:56

## 2017-06-08 RX ADMIN — Medication 1 CAPSULE: at 09:50

## 2017-06-08 RX ADMIN — VERAPAMIL HYDROCHLORIDE 240 MG: 240 TABLET, FILM COATED, EXTENDED RELEASE ORAL at 09:50

## 2017-06-08 RX ADMIN — IPRATROPIUM BROMIDE 0.5 MG: 0.5 SOLUTION RESPIRATORY (INHALATION) at 20:06

## 2017-06-08 RX ADMIN — CEFEPIME 2 G: 2 INJECTION, POWDER, FOR SOLUTION INTRAVENOUS at 00:24

## 2017-06-08 RX ADMIN — PANTOPRAZOLE SODIUM 40 MG: 40 TABLET, DELAYED RELEASE ORAL at 06:44

## 2017-06-08 RX ADMIN — IPRATROPIUM BROMIDE 0.5 MG: 0.5 SOLUTION RESPIRATORY (INHALATION) at 06:32

## 2017-06-08 RX ADMIN — DIGOXIN 125 MCG: 125 TABLET ORAL at 11:58

## 2017-06-08 RX ADMIN — DABIGATRAN ETEXILATE MESYLATE 150 MG: 150 CAPSULE ORAL at 09:50

## 2017-06-08 RX ADMIN — IPRATROPIUM BROMIDE 0.5 MG: 0.5 SOLUTION RESPIRATORY (INHALATION) at 15:16

## 2017-06-08 RX ADMIN — TORSEMIDE 40 MG: 20 TABLET ORAL at 09:50

## 2017-06-08 NOTE — PLAN OF CARE
Problem: Patient Care Overview (Adult)  Goal: Plan of Care Review  Outcome: Ongoing (interventions implemented as appropriate)    06/08/17 8612   Coping/Psychosocial Response Interventions   Plan Of Care Reviewed With patient   Patient Care Overview   Progress improving   Outcome Evaluation   Outcome Summary/Follow up Plan Pt rested well throughout day. No c/o pain, SOA, N/V. Falls precautions remained in place. VSS, no acute signs of distress noted at this time. Will continue to monitor       Goal: Adult Individualization and Mutuality  Outcome: Ongoing (interventions implemented as appropriate)  Goal: Discharge Needs Assessment  Outcome: Ongoing (interventions implemented as appropriate)    Problem: Pneumonia (Adult)  Goal: Signs and Symptoms of Listed Potential Problems Will be Absent or Manageable (Pneumonia)  Outcome: Ongoing (interventions implemented as appropriate)    Problem: Fall Risk (Adult)  Goal: Absence of Falls  Outcome: Ongoing (interventions implemented as appropriate)    Problem: GI Endoscopy (Adult)  Goal: Signs and Symptoms of Listed Potential Problems Will be Absent or Manageable (GI Endoscopy)  Outcome: Ongoing (interventions implemented as appropriate)

## 2017-06-08 NOTE — PLAN OF CARE
Problem: Patient Care Overview (Adult)  Goal: Plan of Care Review  Outcome: Ongoing (interventions implemented as appropriate)  Goal: Adult Individualization and Mutuality  Outcome: Ongoing (interventions implemented as appropriate)  Goal: Discharge Needs Assessment  Outcome: Ongoing (interventions implemented as appropriate)    Problem: Pneumonia (Adult)  Goal: Signs and Symptoms of Listed Potential Problems Will be Absent or Manageable (Pneumonia)  Outcome: Ongoing (interventions implemented as appropriate)    Problem: Fall Risk (Adult)  Goal: Absence of Falls  Outcome: Ongoing (interventions implemented as appropriate)

## 2017-06-08 NOTE — PROGRESS NOTES
[facility]  Phone: [phone]  Rogelio Tucker MD    Subjective:  LOS: 8    Persistent cough.  Otherwise no complaints. HR better controlled after dig. No chest pain.    Objective   Vital Signs past 24hrs  BP range: BP: (111-117)/(61-78) 111/78  Pulse range: Heart Rate:  [] 79  Resp rate range: Resp:  [16-20] 16  Temp range: Temp (24hrs), Av.8 °F (36.6 °C), Min:97.5 °F (36.4 °C), Max:98 °F (36.7 °C)    O2 Device: room airFlow (L/min):  [2] 2  Oxygen range:SpO2:  [94 %-100 %] 98 %   124 lb 14.4 oz (56.7 kg); Body mass index is 22.13 kg/(m^2).    Intake/Output Summary (Last 24 hours) at 17 1617  Last data filed at 17 0844   Gross per 24 hour   Intake              610 ml   Output             3100 ml   Net            -2490 ml       Physical Exam   Constitutional: She appears well-developed.   HENT:   Head: Normocephalic and atraumatic.   Eyes: Pupils are equal, round, and reactive to light.   Neck: Neck supple. No JVD present.   Cardiovascular: Normal rate and regular rhythm.    No murmur heard.  Pulmonary/Chest: Effort normal. No respiratory distress. She has decreased breath sounds. She has no wheezes. She has rales in the right lower field and the left lower field.   Abdominal: Soft. Bowel sounds are normal. She exhibits no mass. There is no tenderness.   Musculoskeletal: She exhibits no edema.   Neurological: She is alert.   Skin: Skin is warm. No rash noted.   Vitals reviewed.    Results Review:    I have reviewed the laboratory and imaging data since the last note by [facility] physician.  My annotations are noted in assessment and plan.    Medication Review:  I have reviewed the current MAR.  My annotations are noted in assessment and plan.      lactated ringers 1,000 mL Last Rate: Stopped (17 1150)   lactated ringers 30 mL/hr Last Rate: 0 mL/hr (17 1045)     Plan   PCCM Problems  1) Bilateral community acquired pneumonia  2) sepsis, 2ary to #1, improved  3) Acute on  chronic hypoxic resp failure  4) Pulmonary interisital edema  5) A fib with RVR recurrent  6) COPD, stable  7) Bronchiectasis with pulmonary MAC and aspergillus   8) Recurrent Clostridium difficile diarrhea  9) Hyponatremia, mild: Likely related to underlying lung disease    Plan of Treatment  Finished Cefepime.    ID will  see in office and decide on KINA treatment.    Now has a VEST at home.    Appreciate cardiology help with afib and CHF. Plan is Digoxin and higher dose of Diltiazem.    Home when ok with cards.    Rogelio Tucker MD  06/08/17  4:17 PM    EMR Dragon/Transcription disclaimer:   Some of this note may be an electronic transcription/translation of spoken language to printed text. The electronic translation of spoken language may permit erroneous, or at times, nonsensical words or phrases to be inadvertently transcribed; Although I have reviewed the note for such errors, some may still exist.

## 2017-06-09 VITALS
BODY MASS INDEX: 26.74 KG/M2 | TEMPERATURE: 98.6 F | WEIGHT: 150.9 LBS | DIASTOLIC BLOOD PRESSURE: 61 MMHG | HEIGHT: 63 IN | SYSTOLIC BLOOD PRESSURE: 110 MMHG | HEART RATE: 91 BPM | OXYGEN SATURATION: 96 % | RESPIRATION RATE: 18 BRPM

## 2017-06-09 PROBLEM — A31.0 MAI (MYCOBACTERIUM AVIUM-INTRACELLULARE): Status: ACTIVE | Noted: 2017-06-09

## 2017-06-09 PROBLEM — I48.91 ATRIAL FIBRILLATION WITH RAPID VENTRICULAR RESPONSE (HCC): Status: ACTIVE | Noted: 2017-06-09

## 2017-06-09 PROCEDURE — 94799 UNLISTED PULMONARY SVC/PX: CPT

## 2017-06-09 RX ORDER — DIGOXIN 125 MCG
125 TABLET ORAL
Qty: 30 TABLET | Refills: 11 | Status: SHIPPED | OUTPATIENT
Start: 2017-06-09 | End: 2017-06-26 | Stop reason: SDUPTHER

## 2017-06-09 RX ORDER — TORSEMIDE 20 MG/1
40 TABLET ORAL DAILY
Qty: 60 TABLET | Refills: 11 | Status: SHIPPED | OUTPATIENT
Start: 2017-06-09 | End: 2020-12-29 | Stop reason: SDUPTHER

## 2017-06-09 RX ADMIN — Medication 1 CAPSULE: at 09:07

## 2017-06-09 RX ADMIN — VERAPAMIL HYDROCHLORIDE 240 MG: 240 TABLET, FILM COATED, EXTENDED RELEASE ORAL at 09:07

## 2017-06-09 RX ADMIN — TORSEMIDE 40 MG: 20 TABLET ORAL at 09:07

## 2017-06-09 RX ADMIN — DABIGATRAN ETEXILATE MESYLATE 150 MG: 150 CAPSULE ORAL at 09:07

## 2017-06-09 RX ADMIN — PANTOPRAZOLE SODIUM 40 MG: 40 TABLET, DELAYED RELEASE ORAL at 06:58

## 2017-06-09 RX ADMIN — IPRATROPIUM BROMIDE 0.5 MG: 0.5 SOLUTION RESPIRATORY (INHALATION) at 10:29

## 2017-06-09 RX ADMIN — DIGOXIN 125 MCG: 125 TABLET ORAL at 09:07

## 2017-06-09 RX ADMIN — IPRATROPIUM BROMIDE 0.5 MG: 0.5 SOLUTION RESPIRATORY (INHALATION) at 06:39

## 2017-06-09 NOTE — PLAN OF CARE
Problem: Patient Care Overview (Adult)  Goal: Plan of Care Review  Outcome: Ongoing (interventions implemented as appropriate)    06/09/17 0453   Coping/Psychosocial Response Interventions   Plan Of Care Reviewed With patient   Outcome Evaluation   Outcome Summary/Follow up Plan Patient resting well, not in any distress. Afib on tele. O2 at night. Fall precautions enforced,monitored.       Goal: Adult Individualization and Mutuality  Outcome: Ongoing (interventions implemented as appropriate)  Goal: Discharge Needs Assessment  Outcome: Ongoing (interventions implemented as appropriate)    Problem: Pneumonia (Adult)  Goal: Signs and Symptoms of Listed Potential Problems Will be Absent or Manageable (Pneumonia)  Outcome: Ongoing (interventions implemented as appropriate)    Problem: Fall Risk (Adult)  Goal: Absence of Falls  Outcome: Ongoing (interventions implemented as appropriate)    Problem: GI Endoscopy (Adult)  Goal: Signs and Symptoms of Listed Potential Problems Will be Absent or Manageable (GI Endoscopy)  Outcome: Ongoing (interventions implemented as appropriate)

## 2017-06-09 NOTE — NURSING NOTE
Spoke with Dr. Rose office, appointment that was scheduled was the earlier they can get the patient in, 6/20/2017

## 2017-06-09 NOTE — PROGRESS NOTES
I have addressed her meds for DC. I am going to put her back on verapamil 360 mg once a day and continue digoxin 0.125 mg a day.  Continue torsemide at 40 mg a day.  My office will call to schedule her to see me on Thursday, June 22.

## 2017-06-09 NOTE — DISCHARGE SUMMARY
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 Still present. Began 4 days ago. Constant. Not alleviated by anything. Associated with cough and fever. No syncope but has had some palpitations. Has hx/o COPD and sees Dr Rogelio Tucker from our practice. Last seen 2 days ago. Had CT ordered by Allyson CISNEROS. 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 In the ER she has sepsis criteria.    Hospital Course    86-year-old female presented with increased shortness of breath.  CT showed progressive infiltrates.  Patient was admitted with acute infection.  Seen by infectious disease.  Patient reported fever high white count was noted.  Cefepime was continued.  Bronchoscopy was scheduled for further evaluation.  She developed atrial fibrillation with rapid ventricular rate did cardiology was therefore called.  Bronchoscopy was performed on 6/13/2017.  Findings are reported as mucous plugs in the return fluid on BAL.  Patient felt better after the bronchoscopy with decreased cough.  She continued to have atrial fibrillation but with good rate control.  Plan was to complete out the cefepime and then send her home.  However she developed recurrence of atrial fibrillation with rapid ventricular rate.  Decision by cardiology to switch her to diltiazem and digoxin only.  She has done well with this regimen and is now ready for discharge.    The plan is to see infectious disease next week for consideration off MAC treatment.  I have also ordered a BMP  to check her sodium levels to be done this coming Monday.  Reports are to be faxed to Dr. Matt Rose and to my office.  Patient has a scheduled appointment with me in August 17.  She will see Dr. Marcie Robbins on June 22.  We have been able to arrange a vest for her.  She will start use of this twice a day.  She has nebs along with Mucomyst to use for airway clearance at home.  She is aware that her bronchiectasis requires management and cannot be completely cleared.  Hopefully treatment for Mack will provide sustained improvement in respiratory status.    Procedures Performed:    Procedure(s) with comments:  BRONCHOSCOPY with BAL  - PRE:  PNEUMONIA  POST:  SAME    Consults:    Consults     Date and Time Order Name Status Description    6/1/2017 1916 Inpatient Consult to Cardiology Completed     5/31/2017 2024 Inpatient Consult to Infectious Diseases Completed     5/31/2017 1859 Pulmonology (on-call MD unless specified) Completed           Pertinent Test Results:      Lab Results   Component Value Date    EF 74 12/19/2016       Results from last 7 days  Lab Units 06/07/17  0540 06/04/17  0427   WBC 10*3/mm3 11.24* 8.34   HEMOGLOBIN g/dL 8.9* 8.2*   HEMATOCRIT % 27.4* 24.5*   PLATELETS 10*3/mm3 393 373       Microbiology Results (last 10 days)     Procedure Component Value - Date/Time    Fungus Culture [[id number]]  (Abnormal) Collected:  06/03/17 1055    Lab Status:  Preliminary result Specimen:  Lavage from Lung, Right Lower Lobe Updated:  06/06/17 1303     Fungus Culture Candida albicans (A)    AFB Culture [[id number]]  (Normal) Collected:  06/03/17 1055    Lab Status:  Preliminary result Specimen:  Lavage from Lung, Right Lower Lobe Updated:  06/08/17 1201     AFB Culture No AFB isolated at less than 1 week     AFB Stain No acid fast bacilli seen on concentrated smear    Fungus Smear [[id number]] Collected:  06/03/17 1055    Lab Status:  Final result Specimen:  Lavage from Lung, Right Lower Lobe Updated:  06/06/17  1303     GMS Stain Rare (1+) Fungal elements    BAL Culture, Quantitative [[id number]] Collected:  06/03/17 1055    Lab Status:  Final result Specimen:  Lavage from Lung, Right Lower Lobe Updated:  06/05/17 0902     Culture >100,000 CFU/mL Normal Respiratory Fortino     Gram Stain Result Many (4+) WBCs seen      Many (4+) Epithelial cells per low power field      Mixed bacterial morphotypes seen on Gram Stain    Respiratory Culture [[id number]] Collected:  06/01/17 0633    Lab Status:  Final result Specimen:  Sputum from Cough Updated:  06/03/17 0850     Respiratory Culture Light growth (2+) Normal Respiratory Fortino     Gram Stain Result Few (2+) WBCs seen      Few (2+) Epithelial cells per low power field      Mixed gram negative fortino    S. Pneumo Ag Urine or CSF [[id number]]  (Normal) Collected:  05/31/17 2346    Lab Status:  Final result Specimen:  Urine from Urine, Clean Catch Updated:  06/01/17 0152     Strep Pneumo Ag Negative    MRSA Screen Culture [[id number]]  (Normal) Collected:  05/31/17 2147    Lab Status:  Final result Specimen:  Swab from Naris, Right Updated:  06/03/17 0848     MRSA SCREEN CX No Methicillin Resistant Staphylococcus aureus Isolated at 2 days    Respiratory Panel, PCR [[id number]]  (Normal) Collected:  05/31/17 2147    Lab Status:  Final result Specimen:  Swab from Nasopharynx Updated:  06/01/17 0238     ADENOVIRUS, PCR Not Detected     Coronavirus 229E Not Detected     Coronavirus HKU1 Not Detected     Coronavirus NL63 Not Detected     Coronavirus OC43 Not Detected     Human Metapneumovirus Not Detected     Human Rhinovirus/Enterovirus Not Detected     Influenza B PCR Not Detected     Parainfluenza Virus 1 Not Detected     Parainfluenza Virus 2 Not Detected     Parainfluenza Virus 3 Not Detected     Parainfluenza Virus 4 Not Detected     Bordetella pertussis pcr Not Detected     Influenza 2009 H1N1 by PCR Not Detected     Chlamydophila pneumoniae PCR Not Detected     Mycoplasma pneumo by PCR  Not Detected     Influenza A PCR Not Detected     Influenza A H3 Not Detected     Influenza A H1 Not Detected     RSV, PCR Not Detected    Blood Culture [[id number]]  (Normal) Collected:  05/31/17 1910    Lab Status:  Final result Specimen:  Blood from Arm, Right Updated:  06/05/17 2001     Blood Culture No growth at 5 days            Results from last 7 days  Lab Units 06/07/17  0540 06/06/17  0455 06/04/17  1929 06/04/17  0427   SODIUM mmol/L 132* 133*  --  136   POTASSIUM mmol/L 3.9 4.2 4.1 3.2*   CHLORIDE mmol/L 91* 91*  --  92*   TOTAL CO2 mmol/L 31.4* 33.5*  --  34.1*   BUN mg/dL 13 12  --  14   CREATININE mg/dL 0.58 0.59  --  0.57       Pertinent Imaging Results:  CT CHEST WITHOUT CONTRAST: 6/1/17     HISTORY: 86-year-old female with cough, fever, history of asthma,  bronchiectasis and KINA.     TECHNIQUE: Spiral axial CT imaging of the chest was performed at 3 mm  intervals throughout. No intravenous contrast was administered.     COMPARISON: Numerous prior studies are available for review. The most  recent is a CTA chest 01/01/2017. The most remote is CTA chest  05/17/2008.     FINDINGS: The heart is enlarged. There is a small pericardial effusion,  unchanged from previous imaging. There are a number of small shotty  nodes present within the mediastinum, stable from prior exam. No new  dominant mass or adenopathy is identified. Chest wall is unremarkable.     Lung windows demonstrate multifocal infiltrate that clearly have  progressed when compared with previous imaging of January 2017.  Groundglass and reticulonodular components were present on previous  imaging which have progressed. There is progressive linear scar and  bronchiectasis at the right middle lobe. New areas of patchy alveolar  infiltrate are present at the left upper lobe and lingula, as well as  the right lower lobe. There is a new small right pleural effusion and  trace left pleural effusion. Visualized upper abdomen appear stable  with  renal and hepatic cysts noted.                     Condition on Discharge:  Good    Vital Signs past 24hrs  BP range: BP: (107-120)/(50-80) 114/79  Pulse range: Heart Rate:  [] 121  Resp rate range: Resp:  [16-24] 18  Temp range: Temp (24hrs), Av.1 °F (36.7 °C), Min:97.6 °F (36.4 °C), Max:98.8 °F (37.1 °C)    O2 Device: room airFlow (L/min):  [2] 2  Oxygen range:SpO2:  [96 %-100 %] 97 %   150 lb 14.4 oz (68.4 kg); Body mass index is 26.73 kg/(m^2).    Intake/Output Summary (Last 24 hours) at 17 1459  Last data filed at 17 0850   Gross per 24 hour   Intake              540 ml   Output                0 ml   Net              540 ml       Physical Exam  Constitutional: She appears well-developed.   HENT:   Head: Normocephalic and atraumatic.   Eyes: Pupils are equal, round, and reactive to light.   Neck: Neck supple. No JVD present.   Cardiovascular: Normal rate and regular rhythm.   No murmur heard.  Pulmonary/Chest: Effort normal. No respiratory distress. She has decreased breath sounds. She has no wheezes. She has rales in the right lower field and the left lower field.   Abdominal: Soft. Bowel sounds are normal. She exhibits no mass. There is no tenderness.   Musculoskeletal: She exhibits no edema.   Neurological: She is alert.   Skin: Skin is warm. No rash noted.     Discharge Disposition   Home or Self Care    Discharge Medications     Your medication list      START taking these medications       Instructions Last Dose Given Next Dose Due    digoxin 125 MCG tablet   Commonly known as:  LANOXIN        Take 1 tablet by mouth Daily.           CHANGE how you take these medications       Instructions Last Dose Given Next Dose Due    acetylcysteine 20 % nebulizer solution   Commonly known as:  MUCOMYST   What changed:  when to take this        Take 4 mL by nebulization 4 (Four) Times a Day.         ezetimibe 10 MG tablet   Commonly known as:  ZETIA   What changed:  how much to take         Take 1 tablet by mouth Daily.           CONTINUE taking these medications       Instructions Last Dose Given Next Dose Due    ACIDOPHILUS PO              ADVAIR -21 MCG/ACT inhaler   Generic drug:  fluticasone-salmeterol        Inhale 2 puffs 2 (Two) Times a Day.         atorvastatin 40 MG tablet   Commonly known as:  LIPITOR        Take 1 tablet by mouth Daily.         calcium carbonate 600 MG tablet   Commonly known as:  OS-EVIN              cetirizine 10 MG tablet   Commonly known as:  zyrTEC              cholecalciferol 1000 UNITS tablet   Commonly known as:  VITAMIN D3              citalopram 20 MG tablet   Commonly known as:  CeleXA              dabigatran etexilate 150 MG capsu   Commonly known as:  PRADAXA        Take 1 capsule by mouth Every 12 (Twelve) Hours.         esomeprazole 20 MG capsule   Commonly known as:  nexIUM              ferrous sulfate 325 (65 FE) MG tablet        Take 1 tablet by mouth 3 (Three) Times a Day With Meals.         glucosamine-chondroitin 500-400 MG capsule capsule              MUCINEX PO              multivitamin tablet              potassium chloride 10 MEQ CR tablet   Commonly known as:  K-DUR,KLOR-CON              SUPER B COMPLEX PO              torsemide 20 MG tablet   Commonly known as:  DEMADEX        Take 2 tablets by mouth Daily for 30 days.         VERAMYST 27.5 MCG/SPRAY nasal spray   Generic drug:  fluticasone              verapamil  MG 24 hr capsule   Commonly known as:  VERELAN PM        Take 1 capsule by mouth Every Night.           STOP taking these medications          furosemide 40 MG tablet   Commonly known as:  LASIX           losartan 50 MG tablet   Commonly known as:  COZAAR                Where to Get Your Medications      These medications were sent to [facility] [id number] [place] [address] [id number] [facility] - [phone] [facility] [phone] [place] [id number], [facility] [address]      Phone:  [phone]    • digoxin 125 MCG tablet   • torsemide 20 MG tablet             Discharge Diet:  Diet Order   Procedures   • Diet Regular       Activity at Discharge:    Ad mira      Follow-up Appointments  Follow-up Information     Follow up with Marcie Robbins MD Follow up on 6/22/2017.    Specialty:  Cardiology    Contact information:    [address] [facility]  [address]  [facility] [address]  [phone]          Follow up with Matt Rose MD .    Specialty:  Family Medicine    Contact information:    [address] [facility]  [address]  [facility] [address]  [phone]          Follow up with Rogelio Tucker MD Follow up on 8/17/2017.    Specialties:  Pulmonary Disease, Sleep Medicine    Contact information:    [address] [facility]  [address]  [facility] [address]  [phone]          Follow up with Hayden Landry MD Follow up on 6/12/2017.    Specialty:  Infectious Diseases    Contact information:    [address] [facility]  [address]  [facility] [address]  [phone]              Discharge Instructions    Check your Sodium on Monday at [facility].    Test Results Pending at Discharge   Order Current Status    AFB Culture Preliminary result    Fungus Culture Preliminary result           Rogelio Tucker MD  06/09/17  2:59 PM    Time: I spent over 30mins in the discharge planning of this patient.    Some of this encounter note is an electronic transcription/translation of spoken language to printed text.    Orders Placed during this admission:    Orders Placed This Encounter   Procedures   • Blood Culture   • MRSA Screen Culture   • Respiratory Culture   • Respiratory Panel, PCR   • S. Pneumo Ag Urine or CSF   • Fungus Culture   • AFB Culture   • Fungus Smear   • BAL Culture, Quantitative   • Comprehensive Metabolic Panel   • Protime-INR   • Urinalysis With / Culture If Indicated   • Procalcitonin   • Lactic Acid, Plasma   • TSH   • T4, Free   • CBC Auto Differential   • Scan Slide   • Urinalysis, Microscopic Only   •  Potassium   • Magnesium   • Troponin   • Digoxin Level   • Blood Gas, Arterial   • Lactic Acid, Plasma   • Procalcitonin   • Legionella Antigen, Urine   • Fungitell B-D Glucan   • CBC (No Diff)   • Basic Metabolic Panel   • BNP   • BNP   • Basic Metabolic Panel   • CBC Auto Differential   • Scan Slide   • Potassium   • Basic Metabolic Panel   • BNP   • Basic Metabolic Panel   • CBC (No Diff)   • Diet Regular   • Strict Intake and Output   • Weigh patient   • Notify Physician (with Parameters)   • Place Sequential Compression Device   • Maintain Sequential Compression Device   • Continuous Pulse Oximetry   • Nurse to Place Order for ECG 12-Lead if Patient Experiences Acute Chest Pain or Dysrhythmias   • May Be Off Telemetry for Tests   • ACLS Protocol For Life Threatening Dysrhythmias (If No DNR Order)   • Notify Provider if ACLS Protocol Activated   • Up With Assistance   • Obtain Informed Consent   • Nursing Communication MAY GIVE PRADAXA TONIGHT (06/01/2017) PER DR. NEVAREZ.   • Verify Informed Consent   • Vital Signs   • Full Code   • Pulmonology (on-call MD unless specified)   • Inpatient Consult to Infectious Diseases   • Inpatient Consult to Cardiology   • Pulse Oximetry,  Spot   • Oxygen Therapy-   • Oxygen Therapy- Nasal Cannula; Titrate for SPO2: 90%, equal to or greater than   • OSCILLATING POSITIVE EXIRATORY PRESSURE (OPEP)   • ECG 12 Lead   • ECG 12 Lead   • BRONCHOSCOPY   • Insert 2nd peripheral IV   • Insert Peripheral IV   • Inpatient Admission   • Discharge patient

## 2017-06-12 ENCOUNTER — OFFICE VISIT (OUTPATIENT)
Dept: INFECTIOUS DISEASES | Facility: CLINIC | Age: 82
End: 2017-06-12

## 2017-06-12 VITALS
DIASTOLIC BLOOD PRESSURE: 63 MMHG | HEIGHT: 63 IN | TEMPERATURE: 97.3 F | HEART RATE: 71 BPM | BODY MASS INDEX: 20.8 KG/M2 | SYSTOLIC BLOOD PRESSURE: 124 MMHG | WEIGHT: 117.4 LBS

## 2017-06-12 DIAGNOSIS — A04.72 C. DIFFICILE COLITIS: ICD-10-CM

## 2017-06-12 DIAGNOSIS — A31.0 PULMONARY MYCOBACTERIUM AVIUM COMPLEX (MAC) INFECTION (HCC): Primary | ICD-10-CM

## 2017-06-12 DIAGNOSIS — I48.20 CHRONIC ATRIAL FIBRILLATION (HCC): ICD-10-CM

## 2017-06-12 LAB — FUNGUS WND CULT: ABNORMAL

## 2017-06-12 PROCEDURE — 99214 OFFICE O/P EST MOD 30 MIN: CPT | Performed by: INTERNAL MEDICINE

## 2017-06-12 NOTE — PROGRESS NOTES
"CC: f/u pulmonary MAC    History from pt and daughter    HPI: Agnieszka Rizzo is a 86 y.o. female here for f/u pulmonary MAC. She was recently admitted for superimposed bacterial pneumonia that is now resolved after a 7-day course of cefepime. She is not having much cough, shortness of air or fever. She is back to her usual daily activities. She is interested in MAC therapy at this time. Bronch cultures are still pending for AFB.    In terms of C diff, her stools are now formed. She continues on a probiotic.    Regarding her Afib, she is currently controlled on digoxin, verapamil, and anti-coagulated on dabigatran.     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 LVQ - \"it just makes me sick\"  2. PCN (tolerates CTX and cefepime) - rash > 60 years ago  3. Erythromycin     Medications:   Current Outpatient Prescriptions:   •  acetylcysteine (MUCOMYST) 20 % nebulizer solution, Take 4 mL by nebulization 4 (Four) Times a Day. (Patient taking differently: Take 4 mL by nebulization 2 (Two) Times a Day.), Disp: 500 mL, Rfl: 1  •  ADVAIR -21 MCG/ACT inhaler, Inhale 2 puffs 2 (Two) Times a Day., Disp: 1 inhaler, Rfl: 0  •  atorvastatin (LIPITOR) 40 MG tablet, Take 1 tablet by mouth Daily., Disp: 90 tablet, Rfl: 3  •  B Complex-C (SUPER B COMPLEX PO), Take 1 tablet by mouth Daily., Disp: , Rfl:   •  calcium carbonate (OS-EVIN) 600 MG tablet, Take 600 mg by mouth Daily., Disp: , Rfl:   •  cetirizine (zyrTEC) 10 MG tablet, Take 10 mg by mouth Daily., Disp: , Rfl:   •  cholecalciferol (VITAMIN D3) 1000 UNITS tablet, Take 1,000 Units by mouth Daily., Disp: , Rfl:   •  " "citalopram (CeleXA) 20 MG tablet, Take 20 mg by mouth Daily., Disp: , Rfl:   •  dabigatran etexilate (PRADAXA) 150 MG capsu, Take 1 capsule by mouth Every 12 (Twelve) Hours., Disp: 180 capsule, Rfl: 3  •  digoxin (LANOXIN) 125 MCG tablet, Take 1 tablet by mouth Daily., Disp: 30 tablet, Rfl: 11  •  esomeprazole (nexIUM) 20 MG capsule, Take 20 mg by mouth Every Morning Before Breakfast., Disp: , Rfl:   •  ezetimibe (ZETIA) 10 MG tablet, Take 1 tablet by mouth Daily. (Patient taking differently: Take 5 mg by mouth Daily.), Disp: 90 tablet, Rfl: 3  •  ferrous sulfate 325 (65 FE) MG tablet, Take 1 tablet by mouth 3 (Three) Times a Day With Meals., Disp: 90 tablet, Rfl: 1  •  glucosamine-chondroitin 500-400 MG capsule capsule, Take 1 capsule by mouth Daily., Disp: , Rfl:   •  Lactobacillus (ACIDOPHILUS PO), Take 2 tablets by mouth Daily., Disp: , Rfl:   •  Multiple Vitamin (MULTIVITAMIN) tablet, Take 1 tablet by mouth Daily., Disp: , Rfl:   •  potassium chloride (K-DUR,KLOR-CON) 10 MEQ CR tablet, Take 10 mEq by mouth Daily., Disp: , Rfl:   •  VERAMYST 27.5 MCG/SPRAY nasal spray, 1 spray into each nostril Daily., Disp: , Rfl:   •  verapamil PM (VERELAN PM) 360 MG 24 hr capsule, Take 1 capsule by mouth Every Night., Disp: 30 capsule, Rfl: 11  •  GuaiFENesin (MUCINEX PO), Take 2 tablets by mouth Daily. In the afternoon, Disp: , Rfl:   •  torsemide (DEMADEX) 20 MG tablet, Take 2 tablets by mouth Daily for 30 days., Disp: 60 tablet, Rfl: 11    OBJECTIVE:  Blood pressure 124/63, pulse 71, temperature 97.3 °F (36.3 °C), height 63\" (160 cm), weight 117 lb 6.4 oz (53.3 kg), not currently breastfeeding.     General: awake, alert, sitting in chair  Head: Normocephalic  Eyes: R eye ptosis, PERRL, no scleral icterus  ENT: MMM, OP clear, no thrush.   Neck: Supple  Cardiovascular: NR, irreg irreg, no murmurs, rubs, or gallops; no LE edema  Respiratory: Lungs with scant  in BLL; normal WOB on RA  GI: Abdomen is soft, mild TTP, mildly " distended  : no Long catheter present  Musculoskeletal: no joint abnormalities, normal musculature  Skin: No rashes, lesions, or embolic phenomenon  Neurological: Alert and oriented x 3, motor strength 4/5 in all four extremities  Psychiatric: Normal mood and affect   Vasc: no cyanosis      DIAGNOSTICS:  Lab Results   Component Value Date    WBC 11.24 (H) 06/07/2017    HGB 8.9 (L) 06/07/2017    HCT 27.4 (L) 06/07/2017     06/07/2017     Lab Results   Component Value Date    GLUCOSE 107 (H) 06/07/2017    BUN 13 06/07/2017    CREATININE 0.58 06/07/2017    EGFRIFNONA 99 06/07/2017    BCR 22.4 06/07/2017    CO2 31.4 (H) 06/07/2017    CALCIUM 9.4 06/07/2017    ALBUMIN 3.50 05/31/2017    LABIL2 0.9 05/31/2017    AST 51 (H) 05/31/2017    ALT 55 (H) 05/31/2017     Microbiology:  5/31 BCx: NGTD  5/31 RVP: negative  5/31 Sputum Cx: normal fortino  6/3 bronch culture: mixed fortino  6/3 bronch AFB: no AFB at 1 week  6/3 bronch fungal: rare fungal elements on gram stain      Radiology (personally reviewed):   No new imaging today      ASSESSMENT/PLAN:  1. 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  -I think MAC is this pathogen and Aspergillus' presence is a result of bronchiectasis  -continue inhalers and start vest therapy per Dr Tucker's recommendations  -I favor a trial of MAC therapy given progression on CT scan and recent admission for exacerbation  -will discuss w/ PharmD extensive medication list to make sure no dangerous interactions and will mitigate risks as able  -if proceed with therapy I will introduce azithromycin 500 mg PO qMWF for 1 week followed by ethambutol 1200 mg PO qMWF for 1 week follow by rifampin 600 mg PO qMWF so we have a step-wise approach into therapy    2. C diff colitis  -previously treated; no current symptoms  -continue probiotics  -another episode of C diff would likely lead me to stopping MAC therapy    3. Chronic atrial fibrillation  -currently controlled;  follows w/ cardiology  -will need to take these medications into consider for drug-drug interactions as per #1    RTC 2 months and check labs at that time if we start therapy    I spent greater than 25 minutes of face-to-face time with patient and >50% counseling re: MAC, treatment options, risks of treatment, benefits of treatment, goals of therapy and drug side effects.

## 2017-06-13 ENCOUNTER — TELEPHONE (OUTPATIENT)
Dept: INFECTIOUS DISEASES | Facility: CLINIC | Age: 82
End: 2017-06-13

## 2017-06-13 NOTE — TELEPHONE ENCOUNTER
03/013/17 -  Christine faxed Dr. Pike note from patients 06/12/17 visit to Dr. Rogelio Tucker per patients request.

## 2017-06-16 ENCOUNTER — TELEPHONE (OUTPATIENT)
Dept: INFECTIOUS DISEASES | Facility: CLINIC | Age: 82
End: 2017-06-16

## 2017-06-16 NOTE — TELEPHONE ENCOUNTER
Phone with Mrs. Rizzo. I informed her per Dr. Landry's request that he will be in contact with her within the next couple of weeks RE: her MAC regimen. I explained that since she is on numerous medications he will be researching with the pharmacist to make sure what the best medication option is to reduce the risk of drug to drug interactions. She was happy to hear this and said she will await a call from Dr. Landry or staff to let her know what medication she will be taking--JERARDO Mayer RN

## 2017-06-20 ENCOUNTER — OFFICE VISIT (OUTPATIENT)
Dept: INTERNAL MEDICINE | Facility: CLINIC | Age: 82
End: 2017-06-20

## 2017-06-20 VITALS
HEIGHT: 63 IN | BODY MASS INDEX: 21.49 KG/M2 | HEART RATE: 73 BPM | WEIGHT: 121.3 LBS | OXYGEN SATURATION: 96 % | TEMPERATURE: 98.2 F | DIASTOLIC BLOOD PRESSURE: 54 MMHG | SYSTOLIC BLOOD PRESSURE: 124 MMHG

## 2017-06-20 DIAGNOSIS — I48.20 CHRONIC ATRIAL FIBRILLATION (HCC): Chronic | ICD-10-CM

## 2017-06-20 DIAGNOSIS — F41.9 ANXIETY: ICD-10-CM

## 2017-06-20 DIAGNOSIS — I10 BENIGN ESSENTIAL HYPERTENSION: Primary | ICD-10-CM

## 2017-06-20 DIAGNOSIS — I25.10 CHRONIC CORONARY ARTERY DISEASE: ICD-10-CM

## 2017-06-20 DIAGNOSIS — E87.1 HYPONATREMIA: ICD-10-CM

## 2017-06-20 DIAGNOSIS — E87.6 HYPOKALEMIA: ICD-10-CM

## 2017-06-20 DIAGNOSIS — D50.9 IRON DEFICIENCY ANEMIA, UNSPECIFIED IRON DEFICIENCY ANEMIA TYPE: ICD-10-CM

## 2017-06-20 PROBLEM — J18.9 PNEUMONIA: Status: RESOLVED | Noted: 2017-01-01 | Resolved: 2017-06-20

## 2017-06-20 LAB
BASOPHILS # BLD AUTO: 0.02 10*3/MM3 (ref 0–0.2)
BASOPHILS NFR BLD AUTO: 0.3 % (ref 0–1.5)
BUN SERPL-MCNC: 17 MG/DL (ref 8–23)
BUN/CREAT SERPL: 24.3 (ref 7–25)
CALCIUM SERPL-MCNC: 9.5 MG/DL (ref 8.6–10.5)
CHLORIDE SERPL-SCNC: 91 MMOL/L (ref 98–107)
CO2 SERPL-SCNC: 30 MMOL/L (ref 22–29)
CREAT SERPL-MCNC: 0.7 MG/DL (ref 0.57–1)
DIFFERENTIAL COMMENT: NORMAL
EOSINOPHIL # BLD AUTO: 0.08 10*3/MM3 (ref 0–0.7)
EOSINOPHIL NFR BLD AUTO: 1 % (ref 0.3–6.2)
ERYTHROCYTE [DISTWIDTH] IN BLOOD BY AUTOMATED COUNT: 16.3 % (ref 11.7–13)
GLUCOSE SERPL-MCNC: 93 MG/DL (ref 65–99)
HCT VFR BLD AUTO: 28.9 % (ref 35.6–45.5)
HGB BLD-MCNC: 10.1 G/DL (ref 11.9–15.5)
IMM GRANULOCYTES # BLD: 0.03 10*3/MM3 (ref 0–0.03)
IMM GRANULOCYTES NFR BLD: 0.4 % (ref 0–0.5)
LYMPHOCYTES # BLD AUTO: 1.46 10*3/MM3 (ref 0.9–4.8)
LYMPHOCYTES NFR BLD AUTO: 19 % (ref 19.6–45.3)
MCH RBC QN AUTO: 34.2 PG (ref 26.9–32)
MCHC RBC AUTO-ENTMCNC: 34.9 G/DL (ref 32.4–36.3)
MCV RBC AUTO: 98 FL (ref 80.5–98.2)
MONOCYTES # BLD AUTO: 0.75 10*3/MM3 (ref 0.2–1.2)
MONOCYTES NFR BLD AUTO: 9.8 % (ref 5–12)
NEUTROPHILS # BLD AUTO: 5.33 10*3/MM3 (ref 1.9–8.1)
NEUTROPHILS NFR BLD AUTO: 69.5 % (ref 42.7–76)
NRBC BLD AUTO-RTO: 0 /100 WBC (ref 0–0)
PLATELET # BLD AUTO: 390 10*3/MM3 (ref 140–500)
PLATELET BLD QL SMEAR: NORMAL
POTASSIUM SERPL-SCNC: 4.3 MMOL/L (ref 3.5–5.2)
RBC # BLD AUTO: 2.95 10*6/MM3 (ref 3.9–5.2)
RBC MORPH BLD: NORMAL
SODIUM SERPL-SCNC: 135 MMOL/L (ref 136–145)
WBC # BLD AUTO: 7.67 10*3/MM3 (ref 4.5–10.7)

## 2017-06-20 PROCEDURE — 99214 OFFICE O/P EST MOD 30 MIN: CPT | Performed by: FAMILY MEDICINE

## 2017-06-20 RX ORDER — CITALOPRAM 20 MG/1
20 TABLET ORAL DAILY
Qty: 90 TABLET | Refills: 3 | Status: SHIPPED | OUTPATIENT
Start: 2017-06-20 | End: 2018-06-21 | Stop reason: SDUPTHER

## 2017-06-20 RX ORDER — AMOXICILLIN 500 MG/1
CAPSULE ORAL
Qty: 4 CAPSULE | Refills: 0 | Status: SHIPPED | OUTPATIENT
Start: 2017-06-20 | End: 2017-08-18

## 2017-06-20 NOTE — PROGRESS NOTES
Subjective   Agnieszka Rizzo is a 86 y.o. female.     Chief Complaint   Patient presents with   • [facility] follow up to pneumonia         History of Present Illness   Patient comes in for follow-up after hospitalization from May 31 2 June 9 for community-acquired pneumonia sepsis atrial fibrillation COPD bronchiectasis hyponatremia,His sister antibiotic is going to have some teeth pulled and would like to have an antibiotic for teeth cleaning because of her artificial knee she has significant history of C. difficile and is presently on probiotics with formed stool no symptomatology related to C. difficile infection recurrence  The following portions of the patient's history were reviewed and updated as appropriate: allergies, current medications, past family history, past medical history, past social history, past surgical history and problem list.  Patient's discharge summary was reviewed she underwent bronchoscopy was treated with Cefepime for pneumonia as well as consultation with cardiology for A. fib with rapid ventricular response her most recent BMP was reviewed June 6 sodium 132 orders pending for future BMP  Review of Systems   Constitutional: Negative.    HENT: Negative.    Eyes: Negative.    Respiratory: Negative.    Cardiovascular: Negative.    Gastrointestinal: Negative for abdominal pain, diarrhea and rectal pain.   Endocrine: Negative.    Genitourinary: Negative.    Musculoskeletal: Negative.    Neurological: Negative.    Hematological: Negative.    Psychiatric/Behavioral: Negative.        Objective   Physical Exam   Constitutional: She is oriented to person, place, and time. She appears well-developed.   HENT:   Head: Normocephalic and atraumatic.   Eyes: Conjunctivae and EOM are normal. Pupils are equal, round, and reactive to light.   Neck: No thyromegaly present.   Cardiovascular: Normal rate.    Pulmonary/Chest: Effort normal and breath sounds normal.   Abdominal: Soft. Bowel sounds are  normal.   Musculoskeletal: Normal range of motion. She exhibits no edema.   Lymphadenopathy:     She has no cervical adenopathy.   Neurological: She is alert and oriented to person, place, and time.   Skin: Skin is warm.   Psychiatric: She has a normal mood and affect. Her behavior is normal. Judgment and thought content normal.   Nursing note and vitals reviewed.      Assessment/Plan   Agnieszka was seen today for [facility] follow up to pneumonia.    Diagnoses and all orders for this visit:    Benign essential hypertension    Chronic atrial fibrillation    Chronic coronary artery disease    Hypokalemia  -     Basic Metabolic Panel    Hyponatremia  -     Basic Metabolic Panel    Anxiety  -     CBC & Differential    Iron deficiency anemia, unspecified iron deficiency anemia type  -     CBC & Differential    Iron deficiency anemia, unspecified iron deficiency anemia type   -     CBC & Differential    Other orders  -     citalopram (CeleXA) 20 MG tablet; Take 1 tablet by mouth Daily.  -     amoxicillin (AMOXIL) 500 MG capsule; 4 pills one hour beeore procedure      Follow-up results of blood work as well as as needed refilled her citalopram scheduled follow-up in 3 months

## 2017-06-22 ENCOUNTER — OFFICE VISIT (OUTPATIENT)
Dept: CARDIOLOGY | Facility: CLINIC | Age: 82
End: 2017-06-22

## 2017-06-22 VITALS
WEIGHT: 114 LBS | HEART RATE: 65 BPM | HEIGHT: 63 IN | RESPIRATION RATE: 16 BRPM | BODY MASS INDEX: 20.2 KG/M2 | SYSTOLIC BLOOD PRESSURE: 120 MMHG | DIASTOLIC BLOOD PRESSURE: 70 MMHG

## 2017-06-22 DIAGNOSIS — B44.1 PULMONARY ASPERGILLOSIS (HCC): ICD-10-CM

## 2017-06-22 DIAGNOSIS — I49.3 VENTRICULAR PREMATURE BEATS: ICD-10-CM

## 2017-06-22 DIAGNOSIS — I10 BENIGN ESSENTIAL HYPERTENSION: ICD-10-CM

## 2017-06-22 DIAGNOSIS — A04.72 C. DIFFICILE COLITIS: ICD-10-CM

## 2017-06-22 DIAGNOSIS — I50.32 CHRONIC DIASTOLIC CHF (CONGESTIVE HEART FAILURE) (HCC): ICD-10-CM

## 2017-06-22 DIAGNOSIS — I25.10 CHRONIC CORONARY ARTERY DISEASE: ICD-10-CM

## 2017-06-22 DIAGNOSIS — E78.2 MIXED HYPERLIPIDEMIA: ICD-10-CM

## 2017-06-22 DIAGNOSIS — I48.21 PERMANENT ATRIAL FIBRILLATION (HCC): Primary | ICD-10-CM

## 2017-06-22 PROCEDURE — 99214 OFFICE O/P EST MOD 30 MIN: CPT | Performed by: INTERNAL MEDICINE

## 2017-06-22 PROCEDURE — 93000 ELECTROCARDIOGRAM COMPLETE: CPT | Performed by: INTERNAL MEDICINE

## 2017-06-22 NOTE — PROGRESS NOTES
PATIENTINFORMATION    Date of Office Visit: 2017  Encounter Provider: Marcie Robbins MD  Place of Service: [facility] CARDIOLOGY  Patient Name: Agnieszka Rizzo  : 1930    Subjective:     Encounter Date:2017      Patient ID: Agnieszka Rizzo is a 86 y.o. female.      History of Present Illness    This is a lady I met while she was hospitalized in 2016. She has a significant pulmonary history and was having a bronchoscopy and unfortunately developed a pneumothorax. She was found to be in rate -controlled atrial fibrillation. She had previously been seen by Dr. Ana Goodrich for history of hypertension, hyperlipidemia, mild mitral valve prolapse and nonobstructive coronary artery disease diagnosed by heart catheterization in .      Echocardiogram November 15, 2016:  All left ventricular wall segments contract normally.  Left ventricular function is normal. Estimated EF = 58%.  Left atrial cavity size is moderately dilated.  Mild tricuspid valve regurgitation is present.  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 They felt she would do well on oral anticoagulation and I started her on Pradaxa. I did not do a stress test while she was hospitalized because she was wheezing and I did not fill comfortable giving her Lexiscan at that time and I did not when he is dobutamine because of her atrial fibrillation.       She was able to have a stress as an outpt on 16 and this was normal. She continued to complain of dyspnea on exertion and so I had her set up for a cardioversion. She had a couple of hospitalizations in the meantime for respiratory issues. While she was in the hospital in 2017, I attempted to cardiovert her. I shocked her 3 times that she did not remain in sinus rhythm. I spoke with her family and we decided to  continue with rate control and anticoagulation.      She was hospitalized in 02/2017. She had started Voriconazole for treatment of pulmonary aspergillosis by Dr. Tucker. She became very nauseous and sick, and threw up for an entire day. She came into the hospital confused and weak. Her sodium was at 109. She was found to have a left clavicle fracture from a fall. She was discharged to [facility], where she has been. Unfortunately, as a result of the treatment for the aspergillosis, she developed C. difficile and was rehospitalized for treatment of this in March 2017. As a part of his treatment she did receive IV fluids. She was discharged but then I readmitted her on March 22 for IV diuresis. She was volume overloaded and in diastolic heart failure. She was back in the hospital on April 2 with more C. difficile colitis. Again she was treated with IV fluids and became volume overloaded. I diuresed her in the hospital and she was only mildly volume overloaded at discharge.    She was hospitalized from May 31 of June 9.  While there she had some rapid ventricular response due to her atrial fibrillation and being sick with community-acquired pneumonia.  Some changes were made to her medication but in the end she was discharged on verapamil 360 mg once a day and digoxin 0.125 mg a day.    She comes in today for follow-up.  She has been feeling well.  She slowly getting stronger but is frustrated at the pace of the progress.  She is not having palpitations, shortness of breath, lightheadedness or chest pain.  She has had some edema in her legs and ankles.  She forgot about increasing her torsemide as needed.    Review of Systems   Constitution: Positive for malaise/fatigue. Negative for fever, weight gain and weight loss.   HENT: Negative for ear pain, hearing loss, nosebleeds and sore throat.    Eyes: Negative for double vision, pain, vision loss in left eye and vision loss in right eye.   Cardiovascular: Positive for  "leg swelling.        See history of present illness.   Respiratory: Positive for cough and shortness of breath. Negative for sleep disturbances due to breathing, snoring and wheezing.    Endocrine: Negative for cold intolerance, heat intolerance and polyuria.   Skin: Negative for itching, poor wound healing and rash.   Musculoskeletal: Negative for joint pain, joint swelling and myalgias.   Gastrointestinal: Negative for abdominal pain, diarrhea, hematochezia, nausea and vomiting.   Genitourinary: Negative for hematuria and hesitancy.   Neurological: Negative for numbness, paresthesias and seizures.   Psychiatric/Behavioral: Negative for depression. The patient is not nervous/anxious.            ECG 12 Lead  Date/Time: 6/22/2017 12:19 PM  Performed by: ANGELI STODDARD  Authorized by: ANGELI STODDARD   Comparison: compared with previous ECG from 6/9/2017  Comparison to previous ECG: Heart rate is slower    Rhythm: atrial fibrillation  BPM: 65  Other findings: PRWP  Clinical impression: abnormal ECG               Objective:     /70 (BP Location: Right arm, Patient Position: Sitting, Cuff Size: Adult)  Pulse 65  Resp 16  Ht 63\" (160 cm)  Wt 114 lb (51.7 kg)  BMI 20.19 kg/m2 Body mass index is 20.19 kg/(m^2).     Physical Exam   Constitutional: She appears well-developed.   HENT:   Head: Normocephalic and atraumatic.   Eyes: Conjunctivae and lids are normal. Pupils are equal, round, and reactive to light. Lids are everted and swept, no foreign bodies found.   Neck: Normal range of motion. No JVD present. Carotid bruit is not present. No tracheal deviation present. No thyroid mass present.   Cardiovascular: Normal rate and normal heart sounds.  An irregularly irregular rhythm present.   Pulses:       Dorsalis pedis pulses are 2+ on the right side, and 2+ on the left side.   Pulmonary/Chest: Effort normal and breath sounds normal.   Abdominal: Normal appearance and bowel sounds are normal.   Musculoskeletal: " Normal range of motion.   Neurological: She is alert. She has normal strength.   Skin: Skin is warm, dry and intact.   Psychiatric: She has a normal mood and affect. Her behavior is normal.   Vitals reviewed.      Lab Review: I reviewed her BMP and CBC from earlier this week.      Assessment/Plan:       1. Atrial fibrillation. She failed cardioversion in January 2017. She has a CHADS2-Vasc score of 5. She is anticoagulated with Pradaxa. She is generally rate controlled. She is not a candidate for beta blockers because of lung disease.  Continue verapamil.  2. Chronic diastolic heart failure. She is on torsemide 40 mg a day. I told her if she's having leg swelling and abdominal distention she should take a second dose of 40 mg of torsemide in the afternoon until the symptoms resolve.  3. Dyspnea on exertion. Multifactorial  4. Mitral valve prolapse with very mild mitral regurgitation.  5. Mild tricuspid regurgitation without pulmonary hypertension.  6. Nonobstructive coronary artery disease diagnosed by catheter in 2007. She has noted coronary artery calcification on CT scans. Nuclear stress 12/16 was normal. .  7. Hypertension. Her blood pressure is controlled.   8. Hyperlipidemia. Zetia and atorvastatin  9. Hyponatremia. Stable.  10. C. difficile diarrhea. This has resolved and she no longer needs a fecal transplant  12. Bronchiectasis and Aspergillus colonization. No further treatment for the aspergillosis per Dr. Landry.      See her back in about 3 months..    Orders Placed This Encounter   Procedures   • ECG 12 Lead     This order was created via procedure documentation      Agnieszka Rizzo   [place] Medication Instructions [facility]:    Printed on:06/22/17 0539   Medication Information                      acetylcysteine (MUCOMYST) 20 % nebulizer solution  Take 4 mL by nebulization 4 (Four) Times a Day.             ADVAIR -21 MCG/ACT inhaler  Inhale 2 puffs 2 (Two) Times a Day.             amoxicillin  (AMOXIL) 500 MG capsule  4 pills one hour beeore procedure             atorvastatin (LIPITOR) 40 MG tablet  Take 1 tablet by mouth Daily.             B Complex-C (SUPER B COMPLEX PO)  Take 1 tablet by mouth Daily.             calcium carbonate (OS-EVIN) 600 MG tablet  Take 600 mg by mouth Daily.             cetirizine (zyrTEC) 10 MG tablet  Take 10 mg by mouth Daily.             cholecalciferol (VITAMIN D3) 1000 UNITS tablet  Take 1,000 Units by mouth Daily.             citalopram (CeleXA) 20 MG tablet  Take 1 tablet by mouth Daily.             dabigatran etexilate (PRADAXA) 150 MG capsu  Take 1 capsule by mouth Every 12 (Twelve) Hours.             digoxin (LANOXIN) 125 MCG tablet  Take 1 tablet by mouth Daily.             esomeprazole (nexIUM) 20 MG capsule  Take 20 mg by mouth Every Morning Before Breakfast.             ezetimibe (ZETIA) 10 MG tablet  Take 1 tablet by mouth Daily.             ferrous sulfate 325 (65 FE) MG tablet  Take 1 tablet by mouth 3 (Three) Times a Day With Meals.             glucosamine-chondroitin 500-400 MG capsule capsule  Take 1 capsule by mouth Daily.             GuaiFENesin (MUCINEX PO)  Take 2 tablets by mouth Daily. In the afternoon             Lactobacillus (ACIDOPHILUS PO)  Take 2 tablets by mouth Daily.             potassium chloride (K-DUR,KLOR-CON) 10 MEQ CR tablet  Take 10 mEq by mouth Daily.             torsemide (DEMADEX) 20 MG tablet  Take 2 tablets by mouth Daily for 30 days.             VERAMYST 27.5 MCG/SPRAY nasal spray  1 spray into each nostril 2 (Two) Times a Day.             verapamil PM (VERELAN PM) 360 MG 24 hr capsule  Take 1 capsule by mouth Every Night.                        Marcie Robbins MD  06/22/17  12:29 PM

## 2017-06-26 ENCOUNTER — TELEPHONE (OUTPATIENT)
Dept: CARDIOLOGY | Facility: CLINIC | Age: 82
End: 2017-06-26

## 2017-06-26 RX ORDER — DIGOXIN 125 MCG
125 TABLET ORAL
Qty: 90 TABLET | Refills: 3 | Status: SHIPPED | OUTPATIENT
Start: 2017-06-26 | End: 2018-06-21 | Stop reason: SDUPTHER

## 2017-06-26 NOTE — TELEPHONE ENCOUNTER
Pt is having a dental appointment and having one of her tooth filled. She is wanting to know if she needs to be pre medicated because she has a-fib? Pt is having this done on 5/28/17 by . Pt3;[phone]. Please Advise    Thanks Yi

## 2017-07-13 ENCOUNTER — TELEPHONE (OUTPATIENT)
Dept: CARDIOLOGY | Facility: CLINIC | Age: 82
End: 2017-07-13

## 2017-07-13 RX ORDER — TORSEMIDE 20 MG/1
20 TABLET ORAL 2 TIMES DAILY
Qty: 360 TABLET | Refills: 3 | Status: SHIPPED | OUTPATIENT
Start: 2017-07-13 | End: 2018-09-25 | Stop reason: SDUPTHER

## 2017-07-13 RX ORDER — WARFARIN SODIUM 3 MG/1
TABLET ORAL
Qty: 30 TABLET | Refills: 11 | Status: SHIPPED | OUTPATIENT
Start: 2017-07-13 | End: 2017-07-13 | Stop reason: SDUPTHER

## 2017-07-13 NOTE — TELEPHONE ENCOUNTER
I talked to her. Last  Pradaxa Friday. Saturday start warfarin 3mg QD. Come in for INR at the office on Tuesday. Refilled her torsemide 20mg 2 pills BID. Drop to QD when edema goes down. She was doubling up on the verapmil. I told her only 360mg QHS.     Will you call her tomorrow and make sure she doesn't have questions

## 2017-07-13 NOTE — TELEPHONE ENCOUNTER
----- Message from Hayden Landry MD sent at 7/13/2017 11:11 AM EDT -----  Regarding: Xarelto ---> Coumadin  Johnson Cardona,    I spoke w/ Agnieszka Rizzo just now and she is agreeable to changing form Xarelto to coumadin for ~ 1 year while on MAC therapy. I let her know about the more frequent monitoring that would be required and she felt like this would not be a hassle. I just asked her to reach out to your office re: getting in with coumadin clinic.    Thanks,  Hayden

## 2017-07-14 RX ORDER — WARFARIN SODIUM 3 MG/1
TABLET ORAL
Qty: 90 TABLET | Refills: 0 | Status: SHIPPED | OUTPATIENT
Start: 2017-07-14 | End: 2017-08-28 | Stop reason: SDUPTHER

## 2017-07-15 LAB
MYCOBACTERIUM SPEC CULT: NORMAL
NIGHT BLUE STAIN TISS: NORMAL

## 2017-07-18 ENCOUNTER — HOSPITAL ENCOUNTER (OUTPATIENT)
Dept: CARDIOLOGY | Facility: HOSPITAL | Age: 82
Setting detail: RECURRING SERIES
Discharge: HOME | End: 2017-07-18

## 2017-07-18 PROCEDURE — 36416 COLLJ CAPILLARY BLOOD SPEC: CPT

## 2017-07-18 PROCEDURE — 85610 PROTHROMBIN TIME: CPT

## 2017-07-21 ENCOUNTER — HOSPITAL ENCOUNTER (OUTPATIENT)
Dept: CARDIOLOGY | Facility: HOSPITAL | Age: 82
Setting detail: RECURRING SERIES
Discharge: HOME | End: 2017-07-21

## 2017-07-21 PROCEDURE — 36416 COLLJ CAPILLARY BLOOD SPEC: CPT

## 2017-07-21 PROCEDURE — 85610 PROTHROMBIN TIME: CPT

## 2017-07-31 ENCOUNTER — HOSPITAL ENCOUNTER (OUTPATIENT)
Dept: CARDIOLOGY | Facility: HOSPITAL | Age: 82
Setting detail: RECURRING SERIES
Discharge: HOME | End: 2017-07-31

## 2017-07-31 PROCEDURE — 36416 COLLJ CAPILLARY BLOOD SPEC: CPT

## 2017-07-31 PROCEDURE — 85610 PROTHROMBIN TIME: CPT

## 2017-08-02 ENCOUNTER — TELEPHONE (OUTPATIENT)
Dept: CARDIOLOGY | Facility: HOSPITAL | Age: 82
End: 2017-08-02

## 2017-08-02 NOTE — TELEPHONE ENCOUNTER
----- Message from Marcie Robbins MD sent at 7/17/2017  2:57 PM EDT -----  Regarding: FW: Xarelto ---> Coumadin  Will you let me know once she is therapeutic on warfarin?  She is supposed to be coming in this week for her first INR       ----- Message -----     From: Hayden Landry MD     Sent: 7/14/2017   7:52 AM       To: Marcie Robbins MD  Subject: RE: Xarelto ---> Coumadin                        Thanks! And as you know my rifampin will probably require a dose adjustment of the warfarin so I'll let you know once we start antibiotics.     Thanks again for your help,  Hayden      ----- Message -----     From: Marcie Robbins MD     Sent: 7/13/2017   4:14 PM       To: Hayden Landry MD  Subject: RE: Xarelto ---> Coumadin                        I talked to her and we have a plan. I'll let you know when I have her on a steady dose of warfarin.     Thanks!!    ----- Message -----     From: Hayden Landry MD     Sent: 7/13/2017  11:11 AM       To: Marcie Robbins MD  Subject: Xarelto ---> Coumadin                            Johnson Cardona,    I spoke w/ Agnieszka Rizzo just now and she is agreeable to changing form Xarelto to coumadin for ~ 1 year while on MAC therapy. I let her know about the more frequent monitoring that would be required and she felt like this would not be a hassle. I just asked her to reach out to your office re: getting in with coumadin clinic.    Thanks,  Hayden Robbins,  Mrs. Rizzo's INR was 3.2 on 7/31/17.   Thanks,   Krysta

## 2017-08-07 ENCOUNTER — HOSPITAL ENCOUNTER (OUTPATIENT)
Dept: CARDIOLOGY | Facility: HOSPITAL | Age: 82
Setting detail: RECURRING SERIES
Discharge: HOME | End: 2017-08-07

## 2017-08-07 PROCEDURE — 36416 COLLJ CAPILLARY BLOOD SPEC: CPT

## 2017-08-07 PROCEDURE — 85610 PROTHROMBIN TIME: CPT

## 2017-08-18 ENCOUNTER — OFFICE VISIT (OUTPATIENT)
Dept: INFECTIOUS DISEASES | Facility: CLINIC | Age: 82
End: 2017-08-18

## 2017-08-18 ENCOUNTER — APPOINTMENT (OUTPATIENT)
Dept: LAB | Facility: HOSPITAL | Age: 82
End: 2017-08-18

## 2017-08-18 VITALS
SYSTOLIC BLOOD PRESSURE: 131 MMHG | HEIGHT: 63 IN | RESPIRATION RATE: 12 BRPM | TEMPERATURE: 97.7 F | BODY MASS INDEX: 21.62 KG/M2 | HEART RATE: 72 BPM | DIASTOLIC BLOOD PRESSURE: 62 MMHG | WEIGHT: 122 LBS

## 2017-08-18 DIAGNOSIS — I48.20 CHRONIC ATRIAL FIBRILLATION (HCC): ICD-10-CM

## 2017-08-18 DIAGNOSIS — A04.72 C. DIFFICILE COLITIS: ICD-10-CM

## 2017-08-18 DIAGNOSIS — A31.0 PULMONARY MYCOBACTERIUM AVIUM COMPLEX (MAC) INFECTION (HCC): Primary | ICD-10-CM

## 2017-08-18 LAB
ALBUMIN SERPL-MCNC: 4 G/DL (ref 3.5–5.2)
ALBUMIN/GLOB SERPL: 1.1 G/DL
ALP SERPL-CCNC: 102 U/L (ref 39–117)
ALT SERPL W P-5'-P-CCNC: 30 U/L (ref 1–33)
ANION GAP SERPL CALCULATED.3IONS-SCNC: 13.1 MMOL/L
AST SERPL-CCNC: 29 U/L (ref 1–32)
BASOPHILS # BLD AUTO: 0.02 10*3/MM3 (ref 0–0.2)
BASOPHILS NFR BLD AUTO: 0.2 % (ref 0–1.5)
BILIRUB SERPL-MCNC: 0.6 MG/DL (ref 0.1–1.2)
BUN BLD-MCNC: 18 MG/DL (ref 8–23)
BUN/CREAT SERPL: 26.1 (ref 7–25)
CALCIUM SPEC-SCNC: 9.4 MG/DL (ref 8.6–10.5)
CHLORIDE SERPL-SCNC: 94 MMOL/L (ref 98–107)
CO2 SERPL-SCNC: 29.9 MMOL/L (ref 22–29)
CREAT BLD-MCNC: 0.69 MG/DL (ref 0.57–1)
DEPRECATED RDW RBC AUTO: 58 FL (ref 37–54)
EOSINOPHIL # BLD AUTO: 0.13 10*3/MM3 (ref 0–0.7)
EOSINOPHIL NFR BLD AUTO: 1.3 % (ref 0.3–6.2)
ERYTHROCYTE [DISTWIDTH] IN BLOOD BY AUTOMATED COUNT: 16.2 % (ref 11.7–13)
GFR SERPL CREATININE-BSD FRML MDRD: 80 ML/MIN/1.73
GLOBULIN UR ELPH-MCNC: 3.5 GM/DL
GLUCOSE BLD-MCNC: 84 MG/DL (ref 65–99)
HCT VFR BLD AUTO: 36.6 % (ref 35.6–45.5)
HGB BLD-MCNC: 11.5 G/DL (ref 11.9–15.5)
IMM GRANULOCYTES # BLD: 0.04 10*3/MM3 (ref 0–0.03)
IMM GRANULOCYTES NFR BLD: 0.4 % (ref 0–0.5)
INR PPP: 2.14 (ref 0.9–1.1)
LYMPHOCYTES # BLD AUTO: 2.16 10*3/MM3 (ref 0.9–4.8)
LYMPHOCYTES NFR BLD AUTO: 21.5 % (ref 19.6–45.3)
MCH RBC QN AUTO: 31 PG (ref 26.9–32)
MCHC RBC AUTO-ENTMCNC: 31.4 G/DL (ref 32.4–36.3)
MCV RBC AUTO: 98.7 FL (ref 80.5–98.2)
MONOCYTES # BLD AUTO: 1.03 10*3/MM3 (ref 0.2–1.2)
MONOCYTES NFR BLD AUTO: 10.3 % (ref 5–12)
NEUTROPHILS # BLD AUTO: 6.66 10*3/MM3 (ref 1.9–8.1)
NEUTROPHILS NFR BLD AUTO: 66.3 % (ref 42.7–76)
NRBC BLD MANUAL-RTO: 0 /100 WBC (ref 0–0)
PLATELET # BLD AUTO: 300 10*3/MM3 (ref 140–500)
PMV BLD AUTO: 9.5 FL (ref 6–12)
POTASSIUM BLD-SCNC: 3.7 MMOL/L (ref 3.5–5.2)
PROT SERPL-MCNC: 7.5 G/DL (ref 6–8.5)
PROTHROMBIN TIME: 23.2 SECONDS (ref 11.7–14.2)
RBC # BLD AUTO: 3.71 10*6/MM3 (ref 3.9–5.2)
SODIUM BLD-SCNC: 137 MMOL/L (ref 136–145)
WBC NRBC COR # BLD: 10.04 10*3/MM3 (ref 4.5–10.7)

## 2017-08-18 PROCEDURE — 99214 OFFICE O/P EST MOD 30 MIN: CPT | Performed by: INTERNAL MEDICINE

## 2017-08-18 PROCEDURE — 80053 COMPREHEN METABOLIC PANEL: CPT | Performed by: INTERNAL MEDICINE

## 2017-08-18 PROCEDURE — 36415 COLL VENOUS BLD VENIPUNCTURE: CPT | Performed by: INTERNAL MEDICINE

## 2017-08-18 PROCEDURE — 85025 COMPLETE CBC W/AUTO DIFF WBC: CPT | Performed by: INTERNAL MEDICINE

## 2017-08-18 PROCEDURE — 85610 PROTHROMBIN TIME: CPT | Performed by: INTERNAL MEDICINE

## 2017-08-18 RX ORDER — AZITHROMYCIN 500 MG/1
500 TABLET, FILM COATED ORAL 3 TIMES WEEKLY
Qty: 12 TABLET | Refills: 11 | Status: SHIPPED | OUTPATIENT
Start: 2017-08-18 | End: 2017-08-25

## 2017-08-18 RX ORDER — RIFAMPIN 300 MG/1
600 CAPSULE ORAL 3 TIMES WEEKLY
Qty: 24 CAPSULE | Refills: 11 | Status: SHIPPED | OUTPATIENT
Start: 2017-08-18 | End: 2018-08-28 | Stop reason: SDUPTHER

## 2017-08-18 RX ORDER — ETHAMBUTOL HYDROCHLORIDE 400 MG/1
1200 TABLET, FILM COATED ORAL 3 TIMES WEEKLY
Qty: 36 TABLET | Refills: 11 | Status: SHIPPED | OUTPATIENT
Start: 2017-08-18 | End: 2018-08-28 | Stop reason: SDUPTHER

## 2017-08-18 NOTE — PROGRESS NOTES
"CC: f/u pulmonary MAC    History from pt and daughter    HPI: Agnieszka Rizzo is a 87 y.o. female here for f/u pulmonary MAC. She is now using the vest per Dr Tucker and it is helping her get up some mucus along w/ use of a nebulizer. She has cough about 1-3x per day. She is back to line dancing and exercising and able to do it without much shortness of air. [facility] Per the chart, her weight is up #8 but she says her home scale shows 3# increase since June. Her appetite is a bit better these days. Very interestingly her June 2017 AFB cultures are negative on bronchoscopy. This is compared to Nov 2016 when she grew MAC. However CT 5/30/17 was unequivocal about reticulonodular opacities.    In terms of C diff, her stools are now formed. They nearly always occur after eating. She continues on a probiotic.    Regarding her Afib, she is currently controlled on digoxin, verapamil, and anti-coagulated on warfarin.     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 LVQ - \"it just makes me sick\"  2. PCN (tolerates CTX and cefepime) - rash > 60 years ago  3. Erythromycin     Medications:   Current Outpatient Prescriptions:   •  acetylcysteine (MUCOMYST) 20 % nebulizer solution, Take 4 mL by nebulization 4 (Four) Times a Day. (Patient taking differently: Take 4 mL by nebulization 2 (Two) Times a Day.), Disp: 500 mL, Rfl: 1  •  ADVAIR -21 MCG/ACT inhaler, Inhale 2 puffs 2 (Two) Times a Day., Disp: 1 inhaler, Rfl: 0  •  amoxicillin (AMOXIL) 500 MG capsule, 4 pills one hour beeore procedure, Disp: 4 capsule, Rfl: 0  •  atorvastatin " "(LIPITOR) 40 MG tablet, Take 1 tablet by mouth Daily., Disp: 90 tablet, Rfl: 3  •  B Complex-C (SUPER B COMPLEX PO), Take 1 tablet by mouth Daily., Disp: , Rfl:   •  calcium carbonate (OS-EVIN) 600 MG tablet, Take 600 mg by mouth Daily., Disp: , Rfl:   •  cetirizine (zyrTEC) 10 MG tablet, Take 10 mg by mouth Daily., Disp: , Rfl:   •  cholecalciferol (VITAMIN D3) 1000 UNITS tablet, Take 1,000 Units by mouth Daily., Disp: , Rfl:   •  citalopram (CeleXA) 20 MG tablet, Take 1 tablet by mouth Daily., Disp: 90 tablet, Rfl: 3  •  digoxin (LANOXIN) 125 MCG tablet, Take 1 tablet by mouth Daily., Disp: 90 tablet, Rfl: 3  •  esomeprazole (nexIUM) 20 MG capsule, Take 20 mg by mouth Every Morning Before Breakfast., Disp: , Rfl:   •  ezetimibe (ZETIA) 10 MG tablet, Take 1 tablet by mouth Daily., Disp: 90 tablet, Rfl: 3  •  ferrous sulfate 325 (65 FE) MG tablet, Take 1 tablet by mouth 3 (Three) Times a Day With Meals., Disp: 90 tablet, Rfl: 1  •  glucosamine-chondroitin 500-400 MG capsule capsule, Take 1 capsule by mouth Daily., Disp: , Rfl:   •  GuaiFENesin (MUCINEX PO), Take 2 tablets by mouth Daily. In the afternoon, Disp: , Rfl:   •  Lactobacillus (ACIDOPHILUS PO), Take 2 tablets by mouth Daily., Disp: , Rfl:   •  potassium chloride (K-DUR,KLOR-CON) 10 MEQ CR tablet, Take 10 mEq by mouth Daily., Disp: , Rfl:   •  torsemide (DEMADEX) 20 MG tablet, Take 1 tablet by mouth 2 (Two) Times a Day., Disp: 360 tablet, Rfl: 3  •  VERAMYST 27.5 MCG/SPRAY nasal spray, 1 spray into each nostril 2 (Two) Times a Day., Disp: , Rfl:   •  verapamil PM (VERELAN PM) 360 MG 24 hr capsule, Take 1 capsule by mouth Every Night., Disp: 30 capsule, Rfl: 11  •  warfarin (COUMADIN) 3 MG tablet, TAKE 1 TABLET BY MOUTH EVERY DAY, Disp: 90 tablet, Rfl: 0    OBJECTIVE:  /62  Pulse 72  Temp 97.7 °F (36.5 °C)  Resp 12  Ht 63\" (160 cm)  Wt 122 lb (55.3 kg)  BMI 21.61 kg/m2    General: awake, alert, sitting in chair  Head: Normocephalic  Eyes: R eye " ptosis, PERRL, no scleral icterus  ENT: MMM, OP clear, no thrush.   Neck: Supple  Cardiovascular: NR, irreg irreg, no murmurs, rubs, or gallops; no LE edema  Respiratory: faint rales  in LLL; normal WOB on RA  GI: Abdomen is soft, mild TTP, mildly distended  : no Long catheter present  Musculoskeletal: no joint abnormalities, normal musculature  Skin: No rashes, lesions, or embolic phenomenon  Neurological: Alert and oriented x 3, motor strength 4/5 in all four extremities  Psychiatric: Normal mood and affect   Vasc: no cyanosis      DIAGNOSTICS:  Lab Results   Component Value Date    WBC 7.67 06/20/2017    HGB 10.1 (L) 06/20/2017    HCT 28.9 (L) 06/20/2017     06/20/2017     Lab Results   Component Value Date    GLUCOSE 107 (H) 06/07/2017    BUN 17 06/20/2017    CREATININE 0.70 06/20/2017    EGFRIFNONA 79 06/20/2017    EGFRIFAFRI 96 06/20/2017    BCR 24.3 06/20/2017    CO2 30.0 (H) 06/20/2017    CALCIUM 9.5 06/20/2017    ALBUMIN 3.50 05/31/2017    LABIL2 0.9 05/31/2017    AST 51 (H) 05/31/2017    ALT 55 (H) 05/31/2017     Lab Results   Component Value Date    INR 1.96 (H) 05/31/2017    INR 1.97 (H) 04/02/2017    INR 1.1 01/22/2016    PROTIME 21.7 (H) 05/31/2017    PROTIME 21.7 (H) 04/02/2017    PROTIME 13.7 01/22/2016     Microbiology:  5/31 BCx: NGTD  5/31 RVP: negative  5/31 Sputum Cx: normal fortino  6/3 bronch culture: mixed fortino  6/3 bronch AFB: no AFB at 6 week  6/3 bronch fungal: rare fungal elements on gram stain      Radiology (personally reviewed):   CT 5/30 with reticulonodular opacities      ASSESSMENT/PLAN:  1. 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  -I think MAC is this pathogen and Aspergillus' presence is a result of bronchiectasis  -continue inhalers and start vest therapy per Dr Tucker's recommendations  -now that she is off of Xarelto and on warfarin, we can start MAC therapy as follows: azithromycin 500 mg PO qMWF for 1 week followed by ethambutol 1200 mg PO  qMWF for 1 week follow by rifampin 600 mg PO qMWF so we have a step-wise approach into therapy  -I discussed w/ cardiologist that warfarin will have to be adjusted after rifampin started  -CBC, CMP today for baseline  -*she is actually going to start the regimen in mid-September when she returns from [place]    2. C diff colitis  -previously treated; no current symptoms  -continue probiotics  -another episode of C diff would likely lead me to stopping MAC therapy    3. Chronic atrial fibrillation  -on warfarin as per #1    RTC 3 months for labs and follow-up    I spent greater than 25 minutes of face-to-face time with patient and >50% counseling re: MAC, treatment options, risks of treatment, benefits of treatment, goals of therapy and drug side effects.    Case d/w Dr Robbins

## 2017-08-21 ENCOUNTER — HOSPITAL ENCOUNTER (OUTPATIENT)
Dept: CARDIOLOGY | Facility: HOSPITAL | Age: 82
Setting detail: RECURRING SERIES
Discharge: HOME | End: 2017-08-21

## 2017-08-21 ENCOUNTER — TELEPHONE (OUTPATIENT)
Dept: INFECTIOUS DISEASES | Facility: CLINIC | Age: 82
End: 2017-08-21

## 2017-08-21 PROCEDURE — 85610 PROTHROMBIN TIME: CPT

## 2017-08-21 PROCEDURE — 36416 COLLJ CAPILLARY BLOOD SPEC: CPT

## 2017-08-21 NOTE — TELEPHONE ENCOUNTER
Erythromycin allergy is GI upset so will monitor.  Aware of interactions with citalopram and digoxin. These have been accounted for.  Please tell her thank you for the reminder.

## 2017-08-21 NOTE — TELEPHONE ENCOUNTER
Pharmacist from [facility] called to confirm that Dr. Landry is aware of the following in regards to the azithromycin that he ordered for pt.    *Pt is allergic to erythromycin  *Azithromycin can interact with citalopram causing   arrythmia issues  *Increase blood levels when used w/digoxin and warfarin  *rifampin - increased toxicity

## 2017-08-25 ENCOUNTER — OFFICE VISIT (OUTPATIENT)
Dept: SPORTS MEDICINE | Facility: CLINIC | Age: 82
End: 2017-08-25

## 2017-08-25 VITALS
HEART RATE: 74 BPM | HEIGHT: 63 IN | WEIGHT: 122 LBS | SYSTOLIC BLOOD PRESSURE: 130 MMHG | DIASTOLIC BLOOD PRESSURE: 58 MMHG | OXYGEN SATURATION: 93 % | BODY MASS INDEX: 21.62 KG/M2

## 2017-08-25 DIAGNOSIS — M75.42 SHOULDER IMPINGEMENT, LEFT: Primary | ICD-10-CM

## 2017-08-25 PROCEDURE — 73030 X-RAY EXAM OF SHOULDER: CPT | Performed by: FAMILY MEDICINE

## 2017-08-25 PROCEDURE — 99204 OFFICE O/P NEW MOD 45 MIN: CPT | Performed by: FAMILY MEDICINE

## 2017-08-25 RX ORDER — VERAPAMIL HYDROCHLORIDE 240 MG/1
240 TABLET, FILM COATED, EXTENDED RELEASE ORAL NIGHTLY
COMMUNITY
Start: 2017-07-25 | End: 2017-10-20 | Stop reason: DRUGHIGH

## 2017-08-25 NOTE — PROGRESS NOTES
"Agnieszka is a 87 y.o. year old female    Chief Complaint   Patient presents with   • Left Shoulder - Pain       History of Present Illness  1.  Patient is here with 2 week history of left shoulder pain.  Pain is located over the superior portion of the shoulder, worse with abduction of the arm.  Also some pain at night with sleeping on that side.  No neck pain or paresthesias.  No known trauma.  Patient did have distal third clavicle fracture in February this year, have reviewed those x-ray images from [facility] ED visit on 2/8/2017 showing nondisplaced fracture distal third clavicle on the left.  She had follow-up x-rays done at [place] [facility] showed healed fracture.  Patient on longer has pain over the clavicle.  Patient is currently being treated for aspergillosis    I have reviewed the patient's medical history in detail and updated the computerized patient record.    Review of Systems   Constitutional: Negative.    HENT: Negative.    Respiratory: Negative.    Cardiovascular: Negative.    Musculoskeletal:        Per HPI   Neurological: Positive for numbness.       /58  Pulse 74  Ht 63\" (160 cm)  Wt 122 lb (55.3 kg)  SpO2 93%  BMI 21.61 kg/m2     Physical Exam    Vital signs reviewed.   General: No acute distress.  Eyes: conjunctiva clear; pupils equally round and reactive  ENT: external ears and nose atraumatic; oropharynx clear  CV: no peripheral edema, 2+ distal pulses  Resp: normal respiratory effort, no use of accessory muscles  Skin: no rashes or wounds; normal turgor  Psych: mood and affect appropriate; recent and remote memory intact  Neuro: sensation to light touch intact    MSK Exam:  Left shoulder exam, there is a palpable bony ridge over the distal third left clavicle presumed to be from previous fracture.  Otherwise shoulder exam general appearance is normal.  Patient has mild tinnitus to palpation over the subacromial space.  Patient begins to get superior lateral pain once " he gets past 60° of abduction, and with forward flexion past 90°.  Rotator cuff motor appears to be 5 out of 5.  Positive Cortes test.  Positive Neer test.  Negative Dundee.  Negative scarf test.  Negative sulcus.    Left Shoulder X-Ray  Indication: Pain  Views: AP Internal and External Rotation and Axillary    Findings:  Old healed fracture left clavicle distal third, the inferior portion of the head of the clavicle does appear to have mild downsloping.  Osteoporosis  Normal soft tissues  Mild degenerative changes of the acromioclavicular joint as well as the glenohumeral joint    No prior studies were available for comparison.      Diagnoses and all orders for this visit:    Shoulder impingement, left  -     XR Shoulder 2+ View Left  -     Ambulatory Referral to Physical Therapy Evaluate and treat    Other orders  -     verapamil SR (CALAN-SR) 240 MG CR tablet;     Will start PT, avoid IASI due to history of aspergillosis   Patient will be visiting her son in [place] starting September 1 for 2 weeks, we will try to get in at least one visit of physical therapy before she leaves but also gave her home exercises to work on as well.  Follow-up 4-5 weeks if no improvement.    EMR Dragon/Transcription disclaimer:    Much of this encounter note is an electronic transcription/translation of spoken language to printed text.  The electronic translation of spoken language may permit erroneous, or at times, nonsensical words or phrases to be inadvertently transcribed.  Although I have reviewed the note for such errors some may still exist.

## 2017-08-28 RX ORDER — WARFARIN SODIUM 3 MG/1
TABLET ORAL
Qty: 135 TABLET | Refills: 0 | OUTPATIENT
Start: 2017-08-28 | End: 2017-10-10

## 2017-09-11 ENCOUNTER — TELEPHONE (OUTPATIENT)
Dept: INFECTIOUS DISEASES | Facility: CLINIC | Age: 82
End: 2017-09-11

## 2017-09-18 ENCOUNTER — HOSPITAL ENCOUNTER (OUTPATIENT)
Dept: CARDIOLOGY | Facility: HOSPITAL | Age: 82
Setting detail: RECURRING SERIES
Discharge: HOME | End: 2017-09-18

## 2017-09-18 ENCOUNTER — TREATMENT (OUTPATIENT)
Dept: PHYSICAL THERAPY | Facility: CLINIC | Age: 82
End: 2017-09-18

## 2017-09-18 DIAGNOSIS — M25.512 ACUTE PAIN OF LEFT SHOULDER: Primary | ICD-10-CM

## 2017-09-18 DIAGNOSIS — M75.42 SHOULDER IMPINGEMENT, LEFT: ICD-10-CM

## 2017-09-18 PROCEDURE — G8984 CARRY CURRENT STATUS: HCPCS | Performed by: PHYSICAL THERAPIST

## 2017-09-18 PROCEDURE — 85610 PROTHROMBIN TIME: CPT

## 2017-09-18 PROCEDURE — 97110 THERAPEUTIC EXERCISES: CPT | Performed by: PHYSICAL THERAPIST

## 2017-09-18 PROCEDURE — 36416 COLLJ CAPILLARY BLOOD SPEC: CPT

## 2017-09-18 PROCEDURE — G8985 CARRY GOAL STATUS: HCPCS | Performed by: PHYSICAL THERAPIST

## 2017-09-18 PROCEDURE — 97140 MANUAL THERAPY 1/> REGIONS: CPT | Performed by: PHYSICAL THERAPIST

## 2017-09-18 PROCEDURE — 97161 PT EVAL LOW COMPLEX 20 MIN: CPT | Performed by: PHYSICAL THERAPIST

## 2017-09-18 RX ORDER — FERROUS SULFATE 325(65) MG
TABLET ORAL
Qty: 90 TABLET | Refills: 0 | Status: SHIPPED | OUTPATIENT
Start: 2017-09-18 | End: 2017-12-11 | Stop reason: SDUPTHER

## 2017-09-18 NOTE — PATIENT INSTRUCTIONS
Please view [facility] Agnieszka Rizzo for a complete list of HEP instructions.  A&P of symptoms.   PT course of care, treatment outcomes.

## 2017-09-18 NOTE — PROGRESS NOTES
Physical Therapy Initial Evaluation    Patient Name: Agnieszka Rizzo       Patient MRN: [id number]  : 1930  Physician:Gabe Cerna MD  Date: 2017    Encounter Diagnoses   Name Primary?   • Acute pain of left shoulder Yes   • Shoulder impingement, left        Subjective Evaluation    History of Present Illness  Date of onset: 2017  Mechanism of injury: Pt reports her shoulder started hurting a couple months ago. Reports it started aching with certain movements then started aching all the time. Reports she was in the hospital on and off recently so she had not been able to do exercises. Hospitalized for lung biopsy and lung collapse, Afib, pneumonia, shingles.   Reports she goes to the [employer] and does exercises. [facility] [employer], then uses upper and lower strengthening exercises on the machines. Also doing [employer]  Pt reports she broke her collar bone back in January, but it healed. Reports she has been using a lung vibrator since  and sometimes leans forward to rest her arms on a table so she can read at the same time.   Denies pain at night, denies N&T in the arm.   PMH: Afib, Cdiff, MAC      Patient Occupation: Retired Quality of life: excellent    Pain  At best pain ratin  At worst pain rating: 10  Location: Back of the left shoulder, upper left arm  Quality: throbbing, sharp and dull ache  Alleviating factors: Hot shower on the back.  Aggravating factors: lifting, movement, outstretched reach and overhead activity (No pain with reaching behind the back. No strength with using the left arm to push up from laying down)    Social Support  Lives with: alone    Hand dominance: right    Diagnostic Tests  X-ray: abnormal (Arthritis)    Treatments  No previous or current treatments  Patient Goals  Patient goals for therapy: decreased pain           Objective     Postural Observations  Seated posture: Increased left scapular anterior tipping. Mild winging  bilateral.        Palpation   Left   Tenderness of the infraspinatus, levator scapulae, supraspinatus, teres major, teres minor and upper trapezius.     Tenderness     Left Shoulder   Tenderness in the supraspinatus tendon (subacromial space).     Active Range of Motion   Left Shoulder   Flexion: 138 (with lowering) degrees with pain  Abduction: 90 (bringing down) degrees with pain  External rotation 45°: WFL  Internal rotation BTB: T6     Right Shoulder   Flexion: 160 degrees   Abduction: 165 degrees   External rotation 45°: WFL  Internal rotation BTB: T10     Passive Range of Motion   Left Shoulder   Flexion: WFL  Abduction: 100 (limited due to pain) degrees with pain    Strength/Myotome Testing     Left Shoulder     Planes of Motion   Flexion: 4-   Abduction: 2+   External rotation at 0°: 4+   Internal rotation at 0°: 4+     Isolated Muscles   Biceps: 4+   Supraspinatus: 4   Triceps: 4+     Right Shoulder     Planes of Motion   Flexion: 4+   Abduction: 4+   External rotation at 0°: 4+   Internal rotation at 0°: 4+     Isolated Muscles   Biceps: 4+   Supraspinatus: 4+   Triceps: 4+     Tests     Left Shoulder   Positive Hawkin's and Neer's.   Negative full can.        Assessment & Plan     Assessment  Impairments: abnormal or restricted ROM, activity intolerance, impaired physical strength, lacks appropriate home exercise program and pain with function  Assessment details: Agnieszka Rizzo is a pleasant 87 y.o. female that presents with signs and symptoms consistent with the above diagnosis.   Pt will benefit from skilled PT services in order to address listed impairments and increase tolerance to normal daily activities including ADL's, work and recreational activities.    Prognosis: good  Functional Limitations: carrying objects, lifting, pulling, pushing, uncomfortable because of pain, reaching behind back and reaching overhead  Goals  Plan Goals: 2 weeks. Pt will:  1. Be independent with initial HEP  2. Report  pain </= 5/10 with all ADL's  3. Demonstrate improved left GH flexion AROM >/= 145  4. Demonstrate improved left GH abduction AROM >/= 120    4 weeks. Pt will:  1. Report pain of </= 2/10 with all ADLs  2. Demonstrate improved left UE MMT of >/= 5-/5  3. Demonstrate improved left GH AROM abduction of >/= 140  4. Return to using the left arm to push up during transfers with no increased pain  5. QuickDASH </= 12%, corresponding with  CI      Plan  Therapy options: will be seen for skilled physical therapy services  Planned modality interventions: cryotherapy, ultrasound, thermotherapy (hydrocollator packs) and TENS  Planned therapy interventions: manual therapy, neuromuscular re-education, postural training, soft tissue mobilization, spinal/joint mobilization, stretching, strengthening, joint mobilization and home exercise program  Frequency: 2x week  Duration in weeks: 4  Treatment plan discussed with: patient        Manual PT [id number] 12 minutes and Therapy Exercise [id number] 11 minutes    Timed Code Treatment: 23   Minutes     Total Treatment Time: 65      Minutes    Naomi Gabriel, PT, DPT  Physical Therapist    Medicare Initial Certification  Certification Period: 12/18/2017  I certify that the therapy services are furnished while this patient is under my care.  The services outlined above are required by this patient, and will be reviewed every 90 days.     PHYSICIAN: Gabe Cerna MD      DATE:     Please sign and return via fax to [phone].. Thank you, [facility].

## 2017-09-21 ENCOUNTER — OFFICE VISIT (OUTPATIENT)
Dept: CARDIOLOGY | Facility: CLINIC | Age: 82
End: 2017-09-21

## 2017-09-21 ENCOUNTER — HOSPITAL ENCOUNTER (OUTPATIENT)
Dept: GENERAL RADIOLOGY | Facility: HOSPITAL | Age: 82
Discharge: HOME | End: 2017-09-21
Attending: INTERNAL MEDICINE | Admitting: INTERNAL MEDICINE

## 2017-09-21 ENCOUNTER — LAB (OUTPATIENT)
Dept: LAB | Facility: HOSPITAL | Age: 82
End: 2017-09-21

## 2017-09-21 VITALS
HEIGHT: 63 IN | RESPIRATION RATE: 16 BRPM | BODY MASS INDEX: 20.45 KG/M2 | SYSTOLIC BLOOD PRESSURE: 128 MMHG | HEART RATE: 83 BPM | DIASTOLIC BLOOD PRESSURE: 70 MMHG | WEIGHT: 115.4 LBS

## 2017-09-21 DIAGNOSIS — I48.20 CHRONIC ATRIAL FIBRILLATION (HCC): Chronic | ICD-10-CM

## 2017-09-21 DIAGNOSIS — R05.9 COUGH: ICD-10-CM

## 2017-09-21 DIAGNOSIS — I10 BENIGN ESSENTIAL HYPERTENSION: ICD-10-CM

## 2017-09-21 DIAGNOSIS — R50.81 FEVER IN OTHER DISEASES: ICD-10-CM

## 2017-09-21 DIAGNOSIS — R53.83 FATIGUE, UNSPECIFIED TYPE: ICD-10-CM

## 2017-09-21 DIAGNOSIS — I25.10 CHRONIC CORONARY ARTERY DISEASE: ICD-10-CM

## 2017-09-21 DIAGNOSIS — E78.2 MIXED HYPERLIPIDEMIA: ICD-10-CM

## 2017-09-21 DIAGNOSIS — R05.9 COUGH: Primary | ICD-10-CM

## 2017-09-21 DIAGNOSIS — I50.32 CHRONIC DIASTOLIC CHF (CONGESTIVE HEART FAILURE) (HCC): ICD-10-CM

## 2017-09-21 DIAGNOSIS — I34.0 NON-RHEUMATIC MITRAL REGURGITATION: ICD-10-CM

## 2017-09-21 LAB
ALBUMIN SERPL-MCNC: 4 G/DL (ref 3.5–5.2)
ALBUMIN/GLOB SERPL: 1.2 G/DL
ALP SERPL-CCNC: 89 U/L (ref 39–117)
ALT SERPL W P-5'-P-CCNC: 22 U/L (ref 1–33)
ANION GAP SERPL CALCULATED.3IONS-SCNC: 14.1 MMOL/L
AST SERPL-CCNC: 31 U/L (ref 1–32)
BASOPHILS # BLD AUTO: 0.04 10*3/MM3 (ref 0–0.2)
BASOPHILS NFR BLD AUTO: 0.5 % (ref 0–1.5)
BILIRUB SERPL-MCNC: 0.8 MG/DL (ref 0.1–1.2)
BUN BLD-MCNC: 20 MG/DL (ref 8–23)
BUN/CREAT SERPL: 22.7 (ref 7–25)
CALCIUM SPEC-SCNC: 9.2 MG/DL (ref 8.6–10.5)
CHLORIDE SERPL-SCNC: 90 MMOL/L (ref 98–107)
CO2 SERPL-SCNC: 29.9 MMOL/L (ref 22–29)
CREAT BLD-MCNC: 0.88 MG/DL (ref 0.57–1)
DEPRECATED RDW RBC AUTO: 59.9 FL (ref 37–54)
EOSINOPHIL # BLD AUTO: 0 10*3/MM3 (ref 0–0.7)
EOSINOPHIL NFR BLD AUTO: 0 % (ref 0.3–6.2)
ERYTHROCYTE [DISTWIDTH] IN BLOOD BY AUTOMATED COUNT: 23.6 % (ref 11.7–13)
FOLATE SERPL-MCNC: >20 NG/ML (ref 4.78–24.2)
GFR SERPL CREATININE-BSD FRML MDRD: 61 ML/MIN/1.73
GLOBULIN UR ELPH-MCNC: 3.4 GM/DL
GLUCOSE BLD-MCNC: 97 MG/DL (ref 65–99)
HCT VFR BLD AUTO: 28.8 % (ref 35.6–45.5)
HGB BLD-MCNC: 11.7 G/DL (ref 11.9–15.5)
IMM GRANULOCYTES # BLD: 0.04 10*3/MM3 (ref 0–0.03)
IMM GRANULOCYTES NFR BLD: 0.5 % (ref 0–0.5)
LYMPHOCYTES # BLD AUTO: 1.03 10*3/MM3 (ref 0.9–4.8)
LYMPHOCYTES NFR BLD AUTO: 14.1 % (ref 19.6–45.3)
MCH RBC QN AUTO: 43 PG (ref 26.9–32)
MCHC RBC AUTO-ENTMCNC: 40.6 G/DL (ref 32.4–36.3)
MCV RBC AUTO: 105.9 FL (ref 80.5–98.2)
MONOCYTES # BLD AUTO: 0.95 10*3/MM3 (ref 0.2–1.2)
MONOCYTES NFR BLD AUTO: 13 % (ref 5–12)
NEUTROPHILS # BLD AUTO: 5.24 10*3/MM3 (ref 1.9–8.1)
NEUTROPHILS NFR BLD AUTO: 71.9 % (ref 42.7–76)
NRBC BLD MANUAL-RTO: 0 /100 WBC (ref 0–0)
PLAT MORPH BLD: NORMAL
PLATELET # BLD AUTO: 225 10*3/MM3 (ref 140–500)
PMV BLD AUTO: 9.7 FL (ref 6–12)
POTASSIUM BLD-SCNC: 3.6 MMOL/L (ref 3.5–5.2)
PROT SERPL-MCNC: 7.4 G/DL (ref 6–8.5)
RBC # BLD AUTO: 2.72 10*6/MM3 (ref 3.9–5.2)
RBC AGGLUT BLD QL: NORMAL
SODIUM BLD-SCNC: 134 MMOL/L (ref 136–145)
VIT B12 BLD-MCNC: 857 PG/ML (ref 211–946)
WBC MORPH BLD: NORMAL
WBC NRBC COR # BLD: 7.3 10*3/MM3 (ref 4.5–10.7)

## 2017-09-21 PROCEDURE — 82607 VITAMIN B-12: CPT

## 2017-09-21 PROCEDURE — 85025 COMPLETE CBC W/AUTO DIFF WBC: CPT

## 2017-09-21 PROCEDURE — 71020 HC CHEST PA AND LATERAL: CPT

## 2017-09-21 PROCEDURE — 36415 COLL VENOUS BLD VENIPUNCTURE: CPT

## 2017-09-21 PROCEDURE — 80053 COMPREHEN METABOLIC PANEL: CPT

## 2017-09-21 PROCEDURE — 99214 OFFICE O/P EST MOD 30 MIN: CPT | Performed by: INTERNAL MEDICINE

## 2017-09-21 PROCEDURE — 85007 BL SMEAR W/DIFF WBC COUNT: CPT

## 2017-09-21 PROCEDURE — 82746 ASSAY OF FOLIC ACID SERUM: CPT

## 2017-09-21 PROCEDURE — 93000 ELECTROCARDIOGRAM COMPLETE: CPT | Performed by: INTERNAL MEDICINE

## 2017-09-21 NOTE — PROGRESS NOTES
PATIENTINFORMATION    Date of Office Visit: 2017  Encounter Provider: Marcie Robbins MD  Place of Service: [facility] CARDIOLOGY  Patient Name: Agnieszka Rizzo  : 1930    Subjective:     Encounter Date:2017      Patient ID: Agnieszka Rizzo is a 87 y.o. female.      History of Present Illness    This is a lady I met while she was hospitalized in 2016. She has a significant pulmonary history and was having a bronchoscopy and unfortunately developed a pneumothorax. She was found to be in rate -controlled atrial fibrillation. She had previously been seen by Dr. Ana Goodrich for history of hypertension, hyperlipidemia, mild mitral valve prolapse and nonobstructive coronary artery disease diagnosed by heart catheterization in .      Echocardiogram November 15, 2016:  All left ventricular wall segments contract normally.  Left ventricular function is normal. Estimated EF = 58%.  Left atrial cavity size is moderately dilated.  Mild tricuspid valve regurgitation is present.  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 They felt she would do well on oral anticoagulation and I started her on Pradaxa. I did not do a stress test while she was hospitalized because she was wheezing and I did not fill comfortable giving her Lexiscan at that time and I did not when he is dobutamine because of her atrial fibrillation.       She was able to have a stress as an outpt on 16 and this was normal. She continued to complain of dyspnea on exertion and so I had her set up for a cardioversion. She had a couple of hospitalizations in the meantime for respiratory issues. While she was in the hospital in 2017, I attempted to cardiovert her. I shocked her 3 times that she did not remain in sinus rhythm. I spoke with her family and we decided to  continue with rate control and anticoagulation.      She was hospitalized in 02/2017. She had started Voriconazole for treatment of pulmonary aspergillosis by Dr. Tucker. She became very nauseous and sick, and threw up for an entire day. She came into the hospital confused and weak. Her sodium was at 109. She was found to have a left clavicle fracture from a fall. She was discharged to [facility], where she has been. Unfortunately, as a result of the treatment for the aspergillosis, she developed C. difficile and was rehospitalized for treatment of this in March 2017. As a part of his treatment she did receive IV fluids. She was discharged but then I readmitted her on March 22 for IV diuresis. She was volume overloaded and in diastolic heart failure. She was back in the hospital on April 2 with more C. difficile colitis. Again she was treated with IV fluids and became volume overloaded. I diuresed her in the hospital and she was only mildly volume overloaded at discharge.     She was hospitalized from May 31 of June 9.  While there she had some rapid ventricular response due to her atrial fibrillation and being sick with community-acquired pneumonia.  Some changes were made to her medication but in the end she was discharged on verapamil 360 mg once a day and digoxin 0.125 mg a day.    She had been doing quite well.  For the last 2 days though she has had nasal congestion, productive cough, fever and fatigue and generally has felt quite poorly.  She had one episode of diarrhea last night but none today.    Review of Systems   Constitution: Positive for malaise/fatigue. Negative for fever, weight gain and weight loss.   HENT: Negative for ear pain, hearing loss, nosebleeds and sore throat.    Eyes: Negative for double vision, pain, vision loss in left eye and vision loss in right eye.   Cardiovascular:        See history of present illness.   Respiratory: Positive for cough. Negative for shortness of breath, sleep  "disturbances due to breathing, snoring and wheezing.    Endocrine: Negative for cold intolerance, heat intolerance and polyuria.   Skin: Negative for itching, poor wound healing and rash.   Musculoskeletal: Positive for joint pain. Negative for joint swelling and myalgias.   Gastrointestinal: Negative for abdominal pain, diarrhea, hematochezia, nausea and vomiting.   Genitourinary: Negative for hematuria and hesitancy.   Neurological: Negative for numbness, paresthesias and seizures.   Psychiatric/Behavioral: Positive for depression. The patient is not nervous/anxious.            ECG 12 Lead  Date/Time: 9/21/2017 12:27 PM  Performed by: ANGELI STODDARD  Authorized by: ANGELI STODDARD   Comparison: compared with previous ECG from 6/22/2017  Comparison to previous ECG: Currently in sinus rhythm  Rhythm: sinus rhythm  BPM: 83  ST segment flattening noted on lead: ST changes consistent with digoxin.  Clinical impression: abnormal ECG               Objective:     /70 (BP Location: Right arm, Patient Position: Sitting, Cuff Size: Adult)  Pulse 83  Resp 16  Ht 63\" (160 cm)  Wt 115 lb 6.4 oz (52.3 kg)  BMI 20.44 kg/m2 Body mass index is 20.44 kg/(m^2).     Physical Exam   Constitutional: She appears well-developed.   HENT:   Head: Normocephalic and atraumatic.   Eyes: Conjunctivae and lids are normal. Pupils are equal, round, and reactive to light. Lids are everted and swept, no foreign bodies found.   Neck: Normal range of motion. No JVD present. Carotid bruit is not present. No tracheal deviation present. No thyroid mass present.   Cardiovascular: Normal rate, regular rhythm and normal heart sounds.    Pulses:       Dorsalis pedis pulses are 2+ on the right side, and 2+ on the left side.   Pulmonary/Chest: Effort normal and breath sounds normal.   Abdominal: Normal appearance and bowel sounds are normal.   Musculoskeletal: Normal range of motion.   Neurological: She is alert. She has normal strength.   Skin: " Skin is warm, dry and intact.   Psychiatric: She has a normal mood and affect. Her behavior is normal.   Vitals reviewed.          Assessment/Plan:       1. Atrial fibrillation. She failed cardioversion in January 2017. She has a CHADS2-Vasc score of 5. She is anticoagulated with Pradaxa. She is generally rate controlled. She is not a candidate for beta blockers because of lung disease.  Continue verapamil.  She is actually in sinus rhythm today.  2. Chronic diastolic heart failure. She is on torsemide 40 mg a day. I told her if she's having leg swelling and abdominal distention she should take a second dose of 40 mg of torsemide in the afternoon until the symptoms resolve.  3. Dyspnea on exertion. Multifactorial  4. Mitral valve prolapse with very mild mitral regurgitation.  5. Mild tricuspid regurgitation without pulmonary hypertension.  6. Nonobstructive coronary artery disease diagnosed by catheter in 2007. She has noted coronary artery calcification on CT scans. Nuclear stress 12/16 was normal. .  7. Hypertension. Her blood pressure is controlled.   8. Hyperlipidemia. Zetia and atorvastatin  9. Hyponatremia. Stable.  10. C. difficile diarrhea. This has resolved and she no longer needs a fecal transplant  12. Bronchiectasis with MAC and aspergillus      See her back in about 3 months.    Orders Placed This Encounter   Procedures   • Respiratory Culture     Standing Status:   Future     Standing Expiration Date:   9/21/2018   • XR Chest 2 View     Standing Status:   Future     Standing Expiration Date:   9/21/2018     Order Specific Question:   Reason for Exam:     Answer:   cough   • Comprehensive Metabolic Panel     Standing Status:   Future     Standing Expiration Date:   9/21/2018   • Vitamin B12     Standing Status:   Future     Standing Expiration Date:   9/21/2018   • Folate     Standing Status:   Future     Standing Expiration Date:   9/21/2018   • ECG 12 Lead     This order was created via procedure  documentation   • CBC & Differential     Standing Status:   Future     Standing Expiration Date:   9/21/2018     Order Specific Question:   Manual Differential     Answer:   No      Agnieszka Rizzo   Home Medication Instructions SILVIA:    Printed on:09/21/17 1233   Medication Information                      acetylcysteine (MUCOMYST) 20 % nebulizer solution  Take 4 mL by nebulization 4 (Four) Times a Day.             ADVAIR -21 MCG/ACT inhaler  Inhale 2 puffs 2 (Two) Times a Day.             atorvastatin (LIPITOR) 40 MG tablet  Take 1 tablet by mouth Daily.             B Complex-C (SUPER B COMPLEX PO)  Take 1 tablet by mouth Daily.             calcium carbonate (OS-EVIN) 600 MG tablet  Take 600 mg by mouth Daily.             cetirizine (zyrTEC) 10 MG tablet  Take 10 mg by mouth Daily.             cholecalciferol (VITAMIN D3) 1000 UNITS tablet  Take 1,000 Units by mouth Daily.             citalopram (CeleXA) 20 MG tablet  Take 1 tablet by mouth Daily.             digoxin (LANOXIN) 125 MCG tablet  Take 1 tablet by mouth Daily.             esomeprazole (nexIUM) 20 MG capsule  Take 20 mg by mouth Every Morning Before Breakfast.             ezetimibe (ZETIA) 10 MG tablet  Take 1 tablet by mouth Daily.             ferrous sulfate 325 (65 FE) MG tablet  TAKE 1 TABLET BY MOUTH THREE TIMES DAILY WITH MEALS             glucosamine-chondroitin 500-400 MG capsule capsule  Take 1 capsule by mouth Daily.             GuaiFENesin (MUCINEX PO)  Take 2 tablets by mouth Daily. In the afternoon             Lactobacillus (ACIDOPHILUS PO)  Take 2 tablets by mouth Daily.             potassium chloride (K-DUR,KLOR-CON) 10 MEQ CR tablet  Take 10 mEq by mouth Daily.             torsemide (DEMADEX) 20 MG tablet  Take 1 tablet by mouth 2 (Two) Times a Day.             VERAMYST 27.5 MCG/SPRAY nasal spray  1 spray into each nostril 2 (Two) Times a Day.             verapamil SR (CALAN-SR) 240 MG CR tablet               warfarin (COUMADIN) 3  MG tablet  TAKE ONE AND A HALF TABLETS BY MOUTH DAILY OR AS DIRECTED                        Marcie Robbins MD  09/21/17  12:33 PM

## 2017-09-22 ENCOUNTER — TREATMENT (OUTPATIENT)
Dept: PHYSICAL THERAPY | Facility: CLINIC | Age: 82
End: 2017-09-22

## 2017-09-22 DIAGNOSIS — M75.42 SHOULDER IMPINGEMENT, LEFT: ICD-10-CM

## 2017-09-22 DIAGNOSIS — M25.512 ACUTE PAIN OF LEFT SHOULDER: Primary | ICD-10-CM

## 2017-09-22 PROCEDURE — 97110 THERAPEUTIC EXERCISES: CPT | Performed by: PHYSICAL THERAPIST

## 2017-09-22 PROCEDURE — 97140 MANUAL THERAPY 1/> REGIONS: CPT | Performed by: PHYSICAL THERAPIST

## 2017-09-22 NOTE — PROGRESS NOTES
Physical Therapy Daily Progress Note      Agnieszka Rizzo reports: pain free compliance with HEP. Denies pain or soreness after last visit.      Objective   See Exercise, Manual, and Modality Logs for complete treatment.     Assessment/Plan  Pt continues to demonstrate tenderness to palpation throughout the left posterior GH and scapula.  Pt performed all progressed strengthening and GH stabilization with no increased pain. Pt would benefit from additional skilled PT to continue to progress left GH ROM and strength.    Progress strengthening /stabilization /functional activity    Manual PT [id number] 10 minutes and Therapy Exercise [id number] 20 minutes    Timed Code Treatment: 30   Minutes     Total Treatment Time: 55      Minutes    Naomi Gabriel, PT, DPT  Physical Therapist #[id number]

## 2017-09-25 ENCOUNTER — TREATMENT (OUTPATIENT)
Dept: PHYSICAL THERAPY | Facility: CLINIC | Age: 82
End: 2017-09-25

## 2017-09-25 ENCOUNTER — HOSPITAL ENCOUNTER (OUTPATIENT)
Dept: CARDIOLOGY | Facility: HOSPITAL | Age: 82
Setting detail: RECURRING SERIES
Discharge: HOME | End: 2017-09-25

## 2017-09-25 ENCOUNTER — TELEPHONE (OUTPATIENT)
Dept: INFECTIOUS DISEASES | Facility: CLINIC | Age: 82
End: 2017-09-25

## 2017-09-25 DIAGNOSIS — M75.42 SHOULDER IMPINGEMENT, LEFT: ICD-10-CM

## 2017-09-25 DIAGNOSIS — M25.512 ACUTE PAIN OF LEFT SHOULDER: Primary | ICD-10-CM

## 2017-09-25 PROCEDURE — 97110 THERAPEUTIC EXERCISES: CPT | Performed by: PHYSICAL THERAPIST

## 2017-09-25 PROCEDURE — 85610 PROTHROMBIN TIME: CPT

## 2017-09-25 PROCEDURE — 36416 COLLJ CAPILLARY BLOOD SPEC: CPT

## 2017-09-25 PROCEDURE — 97140 MANUAL THERAPY 1/> REGIONS: CPT | Performed by: PHYSICAL THERAPIST

## 2017-09-25 NOTE — TELEPHONE ENCOUNTER
Please let patient know that it is OK to stop the medication.  Unfortunately this often happens. She can contact me if she wishes to restart therapy at any point. Also, I had cardiology change her to coumadin because the MAC drugs would have interacted with her Xarelto. If she is coming off the MAC treatment, she could contact Dr Robbins about changing her back to Xarelto. Thanks.

## 2017-09-25 NOTE — TELEPHONE ENCOUNTER
Agnieszka Rizzo called and wants to come off her medication for MAC. States it is making her weak with no energy and causing her to feel like she is dragging.  Wants to know if it is ok to stop medication.

## 2017-09-25 NOTE — PROGRESS NOTES
Physical Therapy Daily Progress Note      Agnieszka Rizzo reports: her shoulder is feeling better overall.       Objective   See Exercise, Manual, and Modality Logs for complete treatment.     Assessment/Plan  Pt continues to demonstrate tenderness throughout the left posterior GH joint complex.  Pt performed all progressed strengthening with no increased pain.  Pt would benefit from additional skilled PT to continue to decrease left shoulder pain.    Progress strengthening /stabilization /functional activity    Manual PT [id number] 10 minutes and Therapy Exercise [id number] 20 minutes    Timed Code Treatment: 30   Minutes     Total Treatment Time: 50      Minutes    Naomi Gabriel, PT, DPT  Physical Therapist #[id number]

## 2017-09-28 ENCOUNTER — LAB (OUTPATIENT)
Dept: LAB | Facility: HOSPITAL | Age: 82
End: 2017-09-28

## 2017-09-28 ENCOUNTER — APPOINTMENT (OUTPATIENT)
Dept: GENERAL RADIOLOGY | Facility: HOSPITAL | Age: 82
End: 2017-09-28

## 2017-09-28 ENCOUNTER — TREATMENT (OUTPATIENT)
Dept: PHYSICAL THERAPY | Facility: CLINIC | Age: 82
End: 2017-09-28

## 2017-09-28 ENCOUNTER — TRANSCRIBE ORDERS (OUTPATIENT)
Dept: ADMINISTRATIVE | Facility: HOSPITAL | Age: 82
End: 2017-09-28

## 2017-09-28 DIAGNOSIS — M75.42 SHOULDER IMPINGEMENT, LEFT: ICD-10-CM

## 2017-09-28 DIAGNOSIS — R50.9 FEVER, UNSPECIFIED FEVER CAUSE: ICD-10-CM

## 2017-09-28 DIAGNOSIS — R05.9 COUGH: Primary | ICD-10-CM

## 2017-09-28 DIAGNOSIS — R05.9 COUGH: ICD-10-CM

## 2017-09-28 DIAGNOSIS — M25.512 ACUTE PAIN OF LEFT SHOULDER: Primary | ICD-10-CM

## 2017-09-28 LAB
BASOPHILS # BLD AUTO: 0.03 10*3/MM3 (ref 0–0.2)
BASOPHILS NFR BLD AUTO: 0.4 % (ref 0–1.5)
DEPRECATED RDW RBC AUTO: 55.7 FL (ref 37–54)
EOSINOPHIL # BLD AUTO: 0.14 10*3/MM3 (ref 0–0.7)
EOSINOPHIL NFR BLD AUTO: 1.7 % (ref 0.3–6.2)
ERYTHROCYTE [DISTWIDTH] IN BLOOD BY AUTOMATED COUNT: 19.9 % (ref 11.7–13)
HCT VFR BLD AUTO: 29.8 % (ref 35.6–45.5)
HGB BLD-MCNC: 10.9 G/DL (ref 11.9–15.5)
IMM GRANULOCYTES # BLD: 0.08 10*3/MM3 (ref 0–0.03)
IMM GRANULOCYTES NFR BLD: 1 % (ref 0–0.5)
LYMPHOCYTES # BLD AUTO: 1.34 10*3/MM3 (ref 0.9–4.8)
LYMPHOCYTES NFR BLD AUTO: 16.7 % (ref 19.6–45.3)
MCH RBC QN AUTO: 37.5 PG (ref 26.9–32)
MCHC RBC AUTO-ENTMCNC: 36.6 G/DL (ref 32.4–36.3)
MCV RBC AUTO: 102.4 FL (ref 80.5–98.2)
MONOCYTES # BLD AUTO: 0.73 10*3/MM3 (ref 0.2–1.2)
MONOCYTES NFR BLD AUTO: 9.1 % (ref 5–12)
NEUTROPHILS # BLD AUTO: 5.7 10*3/MM3 (ref 1.9–8.1)
NEUTROPHILS NFR BLD AUTO: 71.1 % (ref 42.7–76)
NRBC BLD MANUAL-RTO: 0 /100 WBC (ref 0–0)
PLATELET # BLD AUTO: 301 10*3/MM3 (ref 140–500)
PMV BLD AUTO: 9.5 FL (ref 6–12)
RBC # BLD AUTO: 2.91 10*6/MM3 (ref 3.9–5.2)
WBC NRBC COR # BLD: 8.02 10*3/MM3 (ref 4.5–10.7)

## 2017-09-28 PROCEDURE — 71020 HC CHEST PA AND LATERAL: CPT | Performed by: FAMILY MEDICINE

## 2017-09-28 PROCEDURE — 71020 XR CHEST 2 VW: CPT | Performed by: FAMILY MEDICINE

## 2017-09-28 PROCEDURE — 97140 MANUAL THERAPY 1/> REGIONS: CPT | Performed by: PHYSICAL THERAPIST

## 2017-09-28 PROCEDURE — 36415 COLL VENOUS BLD VENIPUNCTURE: CPT

## 2017-09-28 PROCEDURE — 97110 THERAPEUTIC EXERCISES: CPT | Performed by: PHYSICAL THERAPIST

## 2017-09-28 PROCEDURE — 85025 COMPLETE CBC W/AUTO DIFF WBC: CPT

## 2017-09-28 NOTE — PROGRESS NOTES
Physical Therapy Daily Progress Note      Agnieszka Rizzo reports: she was doing [employer] yesterday and lifting the arm out to the side and noticed it did not hurt.     Objective     Active Range of Motion   Left Shoulder   Abduction: 127 degrees      See Exercise, Manual, and Modality Logs for complete treatment.     Assessment/Plan  Pt demonstrates significant improvement with left shoulder abduction AROM.  Pt would benefit from additional skilled PT to continue to progress left GH ROM and strength and decrease pain.   Progress strengthening /stabilization /functional activity    Manual PT [id number] 10 minutes and Therapy Exercise [id number] 20 minutes    Timed Code Treatment: 30   Minutes     Total Treatment Time: 55      Minutes    Naomi Garbiel, PT, DPT  Physical Therapist #[id number]

## 2017-10-03 ENCOUNTER — TELEPHONE (OUTPATIENT)
Dept: INFECTIOUS DISEASES | Facility: CLINIC | Age: 82
End: 2017-10-03

## 2017-10-03 ENCOUNTER — TREATMENT (OUTPATIENT)
Dept: PHYSICAL THERAPY | Facility: CLINIC | Age: 82
End: 2017-10-03

## 2017-10-03 DIAGNOSIS — M75.42 SHOULDER IMPINGEMENT, LEFT: ICD-10-CM

## 2017-10-03 DIAGNOSIS — M25.512 ACUTE PAIN OF LEFT SHOULDER: Primary | ICD-10-CM

## 2017-10-03 PROCEDURE — 97110 THERAPEUTIC EXERCISES: CPT | Performed by: PHYSICAL THERAPIST

## 2017-10-03 PROCEDURE — 97140 MANUAL THERAPY 1/> REGIONS: CPT | Performed by: PHYSICAL THERAPIST

## 2017-10-03 NOTE — PROGRESS NOTES
Physical Therapy Daily Progress Note      Agnieszka Rizzo reports: the shoulder is overall 50% better. Still hurts to lift it up to the side.     Objective   See Exercise, Manual, and Modality Logs for complete treatment.     Assessment/Plan  Pt performed all progressed strengthening with no increased pain, had increased difficulty with wall 90-90 tband ER on the left due to weakness.  Attempted wall slides, however pt reported pain therefore deferred the exercise.  Pt would benefit from additional skilled PT to continue to progress left GH strength and ROM.    Progress strengthening /stabilization /functional activity    Manual PT [id number] 10 minutes and Therapy Exercise 44106 30 minutes    Timed Code Treatment: 40   Minutes     Total Treatment Time: 55      Minutes    Naomi Gabriel, PT, DPT  Physical Therapist #[id number]

## 2017-10-03 NOTE — TELEPHONE ENCOUNTER
KAMALA ONLY: Patient called to let you know that she spoke to Dr. Tucker's office and they really wanted her to continue on the med's for the MAC. She has restarted them and is now going back on the Warfarin and stopping the Pradaxa while she is on the MAC med's. She will have a Protime at the PCP office in 2 weeks. Just wanted to let you know. Thanks, Mimi

## 2017-10-05 ENCOUNTER — TREATMENT (OUTPATIENT)
Dept: PHYSICAL THERAPY | Facility: CLINIC | Age: 82
End: 2017-10-05

## 2017-10-05 DIAGNOSIS — M75.42 SHOULDER IMPINGEMENT, LEFT: ICD-10-CM

## 2017-10-05 DIAGNOSIS — M25.512 ACUTE PAIN OF LEFT SHOULDER: Primary | ICD-10-CM

## 2017-10-05 PROCEDURE — 97140 MANUAL THERAPY 1/> REGIONS: CPT | Performed by: PHYSICAL THERAPIST

## 2017-10-05 PROCEDURE — 97110 THERAPEUTIC EXERCISES: CPT | Performed by: PHYSICAL THERAPIST

## 2017-10-05 NOTE — PROGRESS NOTES
Physical Therapy Daily Progress Note      Agnieszka Rizzo reports: the shoulder is feeling better overall.       Objective   See Exercise, Manual, and Modality Logs for complete treatment.     Assessment/Plan  Pt demonstrates decreased tenderness to the posterior RTC today compared to previous visits.  Progressing well each visit with strength.  Pt would benefit from additional skilled PT to continue to decrease pain.    Progress strengthening /stabilization /functional activity    Manual PT 18866 10 minutes and Therapy Exercise 03964 15 minutes    Timed Code Treatment: 25   Minutes     Total Treatment Time: 50      Minutes    Naomi Gabriel, PT, DPT  Physical Therapist #[id number]

## 2017-10-09 ENCOUNTER — HOSPITAL ENCOUNTER (OUTPATIENT)
Dept: CARDIOLOGY | Facility: HOSPITAL | Age: 82
Setting detail: RECURRING SERIES
Discharge: HOME | End: 2017-10-09

## 2017-10-09 PROCEDURE — 85610 PROTHROMBIN TIME: CPT

## 2017-10-09 PROCEDURE — 36416 COLLJ CAPILLARY BLOOD SPEC: CPT

## 2017-10-10 ENCOUNTER — TREATMENT (OUTPATIENT)
Dept: PHYSICAL THERAPY | Facility: CLINIC | Age: 82
End: 2017-10-10

## 2017-10-10 DIAGNOSIS — M25.512 ACUTE PAIN OF LEFT SHOULDER: Primary | ICD-10-CM

## 2017-10-10 DIAGNOSIS — M75.42 SHOULDER IMPINGEMENT, LEFT: ICD-10-CM

## 2017-10-10 PROCEDURE — 97110 THERAPEUTIC EXERCISES: CPT | Performed by: PHYSICAL THERAPIST

## 2017-10-10 PROCEDURE — 97140 MANUAL THERAPY 1/> REGIONS: CPT | Performed by: PHYSICAL THERAPIST

## 2017-10-10 NOTE — PROGRESS NOTES
Physical Therapy Daily Progress Note      Agnieszka Rizzo reports: the shoulder is healing, still sore when she lifts the arm up to the side.       Objective   See Exercise, Manual, and Modality Logs for complete treatment.     Assessment/Plan  Pt performed all progressed CKC strengthening with no increased pain, reported fatigue.  Decreased tenderness at the posterior RTC musculature.  Pt would benefit from additional skilled PT to continue to decrease pain.    Progress per Plan of Care    Manual PT 30123 10 minutes and Therapy Exercise 05748 20 minutes    Timed Code Treatment: 30   Minutes     Total Treatment Time: 50      Minutes    Naomi Gabriel, PT, DPT  Physical Therapist #[id number]

## 2017-10-12 ENCOUNTER — HOSPITAL ENCOUNTER (OUTPATIENT)
Dept: CARDIOLOGY | Facility: HOSPITAL | Age: 82
Setting detail: RECURRING SERIES
Discharge: HOME | End: 2017-10-12

## 2017-10-12 ENCOUNTER — TELEPHONE (OUTPATIENT)
Dept: CARDIOLOGY | Facility: CLINIC | Age: 82
End: 2017-10-12

## 2017-10-12 ENCOUNTER — TREATMENT (OUTPATIENT)
Dept: PHYSICAL THERAPY | Facility: CLINIC | Age: 82
End: 2017-10-12

## 2017-10-12 DIAGNOSIS — M75.42 SHOULDER IMPINGEMENT, LEFT: ICD-10-CM

## 2017-10-12 DIAGNOSIS — M25.512 ACUTE PAIN OF LEFT SHOULDER: Primary | ICD-10-CM

## 2017-10-12 PROCEDURE — 85610 PROTHROMBIN TIME: CPT

## 2017-10-12 PROCEDURE — 36416 COLLJ CAPILLARY BLOOD SPEC: CPT

## 2017-10-12 PROCEDURE — 97140 MANUAL THERAPY 1/> REGIONS: CPT | Performed by: PHYSICAL THERAPIST

## 2017-10-12 PROCEDURE — 97110 THERAPEUTIC EXERCISES: CPT | Performed by: PHYSICAL THERAPIST

## 2017-10-12 NOTE — PROGRESS NOTES
Physical Therapy Daily Progress Note      Agnieszka Rizzo reports: [facility]. She reports she still feels soreness when she lifts the arm out to the side.       Objective   See Exercise, Manual, and Modality Logs for complete treatment.     Assessment/Plan  Pt reported painful empty end feel with GH PROM abduction.  Pt performed all progressed strengthening with no increased pain. Pt would benefit from additional skilled PT to continue to decrease pain and improve left GH strength.    Progress per Plan of Care-RECERT next visit    Manual PT [id number] 10 minutes and Therapy Exercise 11255 30 minutes    Timed Code Treatment: 40   Minutes     Total Treatment Time: 55      Minutes    Naomi Gabriel, PT, DPT  Physical Therapist #[id number]

## 2017-10-12 NOTE — TELEPHONE ENCOUNTER
Pt needs a letter from you dictating that she was under your care and unable to fly this past may due to being hospitalized so that she can get a refund on her ticket.

## 2017-10-16 ENCOUNTER — HOSPITAL ENCOUNTER (OUTPATIENT)
Dept: CARDIOLOGY | Facility: HOSPITAL | Age: 82
Setting detail: RECURRING SERIES
Discharge: HOME | End: 2017-10-16

## 2017-10-16 PROCEDURE — 36416 COLLJ CAPILLARY BLOOD SPEC: CPT

## 2017-10-16 PROCEDURE — 85610 PROTHROMBIN TIME: CPT

## 2017-10-17 ENCOUNTER — TREATMENT (OUTPATIENT)
Dept: PHYSICAL THERAPY | Facility: CLINIC | Age: 82
End: 2017-10-17

## 2017-10-17 DIAGNOSIS — M75.42 SHOULDER IMPINGEMENT, LEFT: ICD-10-CM

## 2017-10-17 DIAGNOSIS — M25.512 ACUTE PAIN OF LEFT SHOULDER: Primary | ICD-10-CM

## 2017-10-17 PROCEDURE — 97140 MANUAL THERAPY 1/> REGIONS: CPT | Performed by: PHYSICAL THERAPIST

## 2017-10-17 PROCEDURE — 97110 THERAPEUTIC EXERCISES: CPT | Performed by: PHYSICAL THERAPIST

## 2017-10-17 NOTE — PROGRESS NOTES
Re-Assessment   Patient: Agnieszka Rizzo   : 1930  Diagnosis/ICD-10 Code:  Acute pain of left shoulder [M25.512]  Referring practitioner: Gabe Cerna,*  Date of Initial Visit: 2017  Today's Date: 10/17/2017  Patient seen for 9 sessions    Subjective:   Agnieszka Rizzo reports: the shoulder is a lot better. Returned to gym program, still soreness when lifts the arm out to the side.  Subjective Questionnaire: QuickDASH: 6%-improved from 25%  Clinical Progress: improved  Home Program Compliance: Yes  Treatment has included: therapeutic exercise, neuromuscular re-education, manual therapy, moist heat and cryotherapy    Subjective   Objective       Active Range of Motion   Left Shoulder   Flexion: 143 degrees   Abduction: 123 degrees    External rotation 45°: WFL  Internal rotation BTB: T6     Left Shoulder      Planes of Motion   Flexion: 4+   Abduction: 4   External rotation at 0°: 4+   Internal rotation at 0°: 4+     Assessment/Plan   Pt is progressing well with left shoulder AROM and strength.  Pt would benefit from additional skilled PT to continue to progress left GH strength, especially with abduction.    Progress toward previous goals: Partially Met    New Goals  Plan Goals: 2 weeks. Pt will:  1. Be independent with initial HEP  2. Report pain </= 5/10 with all ADL's  3. Demonstrate improved left GH flexion AROM >/= 145  4. Demonstrate improved left GH abduction AROM >/= 120    4 weeks. Pt will:  1. Report pain of </= 2/10 with all ADLs  2. Demonstrate improved left UE MMT of >/= 5-/5  3. Demonstrate improved left GH AROM abduction of >/= 140  4. Return to using the left arm to push up during transfers with no increased pain  5. QuickDASH </= 12%, corresponding with  CI      Recommendations: Continue with recommendations -pt to continue HEP, follow up in 2 weeks  Timeframe: 2 weeks  Prognosis to achieve goals: good    Manual PT [id number] 10 minutes and Therapy Exercise 03154 20  minutes    Timed Code Treatment: 30   Minutes     Total Treatment Time: 50      Minutes    PT Signature: Naomi Gabriel, PT, DPT  [place] License # [id number]    Based upon review of the patient's progress and continued therapy plan, it is my medical opinion that Agnieszka Rizzo should continue physical therapy treatment at [facility] PH[facility]  [address] [place] [id number]  [phone].    Signature: __________________________________  Gabe Cerna MD

## 2017-10-19 ENCOUNTER — HOSPITAL ENCOUNTER (OUTPATIENT)
Dept: CARDIOLOGY | Facility: HOSPITAL | Age: 82
Setting detail: RECURRING SERIES
Discharge: HOME | End: 2017-10-19

## 2017-10-19 PROCEDURE — 85610 PROTHROMBIN TIME: CPT

## 2017-10-19 PROCEDURE — 36416 COLLJ CAPILLARY BLOOD SPEC: CPT

## 2017-10-20 ENCOUNTER — TELEPHONE (OUTPATIENT)
Dept: INTERNAL MEDICINE | Facility: CLINIC | Age: 82
End: 2017-10-20

## 2017-10-20 ENCOUNTER — OFFICE VISIT (OUTPATIENT)
Dept: INTERNAL MEDICINE | Facility: CLINIC | Age: 82
End: 2017-10-20

## 2017-10-20 VITALS
TEMPERATURE: 97.7 F | WEIGHT: 120.6 LBS | HEIGHT: 63 IN | OXYGEN SATURATION: 98 % | BODY MASS INDEX: 21.37 KG/M2 | HEART RATE: 60 BPM | DIASTOLIC BLOOD PRESSURE: 64 MMHG | SYSTOLIC BLOOD PRESSURE: 124 MMHG

## 2017-10-20 DIAGNOSIS — R79.89 ABNORMAL CBC: Primary | ICD-10-CM

## 2017-10-20 DIAGNOSIS — I10 BENIGN ESSENTIAL HYPERTENSION: ICD-10-CM

## 2017-10-20 DIAGNOSIS — F41.9 ANXIETY: Primary | ICD-10-CM

## 2017-10-20 DIAGNOSIS — D50.9 IRON DEFICIENCY ANEMIA, UNSPECIFIED IRON DEFICIENCY ANEMIA TYPE: ICD-10-CM

## 2017-10-20 PROBLEM — J96.01 ACUTE RESPIRATORY FAILURE WITH HYPOXIA: Status: RESOLVED | Noted: 2017-01-09 | Resolved: 2017-10-20

## 2017-10-20 PROBLEM — A04.72 C. DIFFICILE COLITIS: Status: RESOLVED | Noted: 2017-03-11 | Resolved: 2017-10-20

## 2017-10-20 PROBLEM — K51.00 PANCOLITIS: Status: RESOLVED | Noted: 2017-04-02 | Resolved: 2017-10-20

## 2017-10-20 PROCEDURE — 99214 OFFICE O/P EST MOD 30 MIN: CPT | Performed by: FAMILY MEDICINE

## 2017-10-20 PROCEDURE — G0438 PPPS, INITIAL VISIT: HCPCS | Performed by: FAMILY MEDICINE

## 2017-10-20 RX ORDER — VERAPAMIL HYDROCHLORIDE 360 MG/1
360 CAPSULE, DELAYED RELEASE PELLETS ORAL NIGHTLY
COMMUNITY
End: 2018-08-16

## 2017-10-20 RX ORDER — FLUTICASONE PROPIONATE 50 MCG
2 SPRAY, SUSPENSION (ML) NASAL NIGHTLY PRN
Status: ON HOLD | COMMUNITY
Start: 2017-09-29

## 2017-10-20 RX ORDER — WARFARIN SODIUM 3 MG/1
TABLET ORAL
COMMUNITY
End: 2017-11-01 | Stop reason: SDUPTHER

## 2017-10-20 NOTE — TELEPHONE ENCOUNTER
Patient notified that based on her lab results Dr. Rose would like for her to have a CBC in 1 month.

## 2017-10-20 NOTE — PROGRESS NOTES
Agnieszka Rizzo is a 87 y.o. female.  Seen 10/20/2017    Assessment/Plan   Problem List Items Addressed This Visit        Cardiovascular and Mediastinum    Benign essential hypertension    Relevant Medications    verapamil PM (VERELAN PM) 360 MG 24 hr capsule       Hematopoietic and Hemostatic    Iron deficiency anemia       Other    Anxiety - Primary           Continue present management of iron deficiency anemia supplemented is going to continue the Celexa for her anxiety and continue verapamil for blood pressure she'll otherwise follow-up as needed or in1 months repeat CBC    Subjective     Chief Complaint   Patient presents with   • follow up to anxiety   • follow up to anemia   • Initial Medicare Wellness     Social History   Substance Use Topics   • Smoking status: Never Smoker   • Smokeless tobacco: Never Used      Comment: no caffiene   • Alcohol use Yes      Comment: occasional       History of Present Illness   Follow-up chronic medical problems and anxiety anemia and hypertension she's doing fairly well with no chest pain shortness of breath or increased fatigue and feels actually pretty great she is followed chronically by her pulmonologist for bronchiectasis  The following portions of the patient's history were reviewed and updated as appropriate:PMHroutine: Social history , Past Medical History, Allergies, Current Medications, Active Problem List and Health Maintenance    Review of Systems   Constitutional: Negative.    HENT: Negative.    Eyes: Negative.    Respiratory: Negative.    Cardiovascular: Negative.    Gastrointestinal: Negative for abdominal pain, diarrhea and rectal pain.   Endocrine: Negative.    Genitourinary: Negative.    Musculoskeletal: Negative.    Neurological: Negative.    Hematological: Negative.    Psychiatric/Behavioral: Negative.        Objective   Vitals:    10/20/17 1033   BP: 124/64   BP Location: Right arm   Patient Position: Sitting   Cuff Size: Adult   Pulse: 60   Temp: 97.7  "°F (36.5 °C)   TempSrc: Oral   SpO2: 98%   Weight: 120 lb 9.6 oz (54.7 kg)   Height: 63\" (160 cm)     Body mass index is 21.36 kg/(m^2).  Physical Exam   Constitutional: She is oriented to person, place, and time. She appears well-developed and well-nourished. No distress.   HENT:   Head: Normocephalic and atraumatic.   Eyes: Conjunctivae and EOM are normal. Pupils are equal, round, and reactive to light. Right eye exhibits no discharge. Left eye exhibits no discharge. No scleral icterus.   Neck: Normal range of motion. Neck supple. No tracheal deviation present. No thyromegaly present.   Cardiovascular: Normal rate, regular rhythm, normal heart sounds, intact distal pulses and normal pulses.  Exam reveals no gallop.    No murmur heard.  Pulmonary/Chest: Effort normal and breath sounds normal. No respiratory distress. She has no wheezes. She has no rales.   Musculoskeletal: Normal range of motion.   Neurological: She is alert and oriented to person, place, and time. She exhibits normal muscle tone. Coordination normal.   Skin: Skin is warm. No rash noted. No erythema. No pallor.   Psychiatric: She has a normal mood and affect. Her behavior is normal. Judgment and thought content normal.   Nursing note and vitals reviewed.    Reviewed Data:  Lab on 09/28/2017   Component Date Value Ref Range Status   • WBC 09/28/2017 8.02  4.50 - 10.70 10*3/mm3 Final   • RBC 09/28/2017 2.91* 3.90 - 5.20 10*6/mm3 Final   • Hemoglobin 09/28/2017 10.9* 11.9 - 15.5 g/dL Final   • Hematocrit 09/28/2017 29.8* 35.6 - 45.5 % Final   • MCV 09/28/2017 102.4* 80.5 - 98.2 fL Final   • MCH 09/28/2017 37.5* 26.9 - 32.0 pg Final   • MCHC 09/28/2017 36.6* 32.4 - 36.3 g/dL Final   • RDW 09/28/2017 19.9* 11.7 - 13.0 % Final   • RDW-SD 09/28/2017 55.7* 37.0 - 54.0 fl Final   • MPV 09/28/2017 9.5  6.0 - 12.0 fL Final   • Platelets 09/28/2017 301  140 - 500 10*3/mm3 Final   • Neutrophil % 09/28/2017 71.1  42.7 - 76.0 % Final   • Lymphocyte % 09/28/2017 " 16.7* 19.6 - 45.3 % Final   • Monocyte % 09/28/2017 9.1  5.0 - 12.0 % Final   • Eosinophil % 09/28/2017 1.7  0.3 - 6.2 % Final   • Basophil % 09/28/2017 0.4  0.0 - 1.5 % Final   • Immature Grans % 09/28/2017 1.0* 0.0 - 0.5 % Final   • Neutrophils, Absolute 09/28/2017 5.70  1.90 - 8.10 10*3/mm3 Final   • Lymphocytes, Absolute 09/28/2017 1.34  0.90 - 4.80 10*3/mm3 Final   • Monocytes, Absolute 09/28/2017 0.73  0.20 - 1.20 10*3/mm3 Final   • Eosinophils, Absolute 09/28/2017 0.14  0.00 - 0.70 10*3/mm3 Final   • Basophils, Absolute 09/28/2017 0.03  0.00 - 0.20 10*3/mm3 Final   • Immature Grans, Absolute 09/28/2017 0.08* 0.00 - 0.03 10*3/mm3 Final   • nRBC 09/28/2017 0.0  0.0 - 0.0 /100 WBC Final   Lab on 09/21/2017   Component Date Value Ref Range Status   • Glucose 09/21/2017 97  65 - 99 mg/dL Final   • BUN 09/21/2017 20  8 - 23 mg/dL Final   • Creatinine 09/21/2017 0.88  0.57 - 1.00 mg/dL Final   • Sodium 09/21/2017 134* 136 - 145 mmol/L Final   • Potassium 09/21/2017 3.6  3.5 - 5.2 mmol/L Final   • Chloride 09/21/2017 90* 98 - 107 mmol/L Final   • CO2 09/21/2017 29.9* 22.0 - 29.0 mmol/L Final   • Calcium 09/21/2017 9.2  8.6 - 10.5 mg/dL Final   • Total Protein 09/21/2017 7.4  6.0 - 8.5 g/dL Final   • Albumin 09/21/2017 4.00  3.50 - 5.20 g/dL Final   • ALT (SGPT) 09/21/2017 22  1 - 33 U/L Final   • AST (SGOT) 09/21/2017 31  1 - 32 U/L Final   • Alkaline Phosphatase 09/21/2017 89  39 - 117 U/L Final   • Total Bilirubin 09/21/2017 0.8  0.1 - 1.2 mg/dL Final   • eGFR Non African Amer 09/21/2017 61  >60 mL/min/1.73 Final   • Globulin 09/21/2017 3.4  gm/dL Final   • A/G Ratio 09/21/2017 1.2  g/dL Final   • BUN/Creatinine Ratio 09/21/2017 22.7  7.0 - 25.0 Final   • Anion Gap 09/21/2017 14.1  mmol/L Final   • Vitamin B-12 09/21/2017 857  211 - 946 pg/mL Final   • Folate 09/21/2017 >20.00  4.78 - 24.20 ng/mL Final   • WBC 09/21/2017 7.30  4.50 - 10.70 10*3/mm3 Final   • RBC 09/21/2017 2.72* 3.90 - 5.20 10*6/mm3 Final   •  Hemoglobin 09/21/2017 11.7* 11.9 - 15.5 g/dL Final   • Hematocrit 09/21/2017 28.8* 35.6 - 45.5 % Final   • MCV 09/21/2017 105.9* 80.5 - 98.2 fL Final   • MCH 09/21/2017 43.0* 26.9 - 32.0 pg Final   • MCHC 09/21/2017 40.6* 32.4 - 36.3 g/dL Final   • RDW 09/21/2017 23.6* 11.7 - 13.0 % Final   • RDW-SD 09/21/2017 59.9* 37.0 - 54.0 fl Final   • MPV 09/21/2017 9.7  6.0 - 12.0 fL Final   • Platelets 09/21/2017 225  140 - 500 10*3/mm3 Final   • Neutrophil % 09/21/2017 71.9  42.7 - 76.0 % Final   • Lymphocyte % 09/21/2017 14.1* 19.6 - 45.3 % Final   • Monocyte % 09/21/2017 13.0* 5.0 - 12.0 % Final   • Eosinophil % 09/21/2017 0.0* 0.3 - 6.2 % Final   • Basophil % 09/21/2017 0.5  0.0 - 1.5 % Final   • Immature Grans % 09/21/2017 0.5  0.0 - 0.5 % Final   • Neutrophils, Absolute 09/21/2017 5.24  1.90 - 8.10 10*3/mm3 Final   • Lymphocytes, Absolute 09/21/2017 1.03  0.90 - 4.80 10*3/mm3 Final   • Monocytes, Absolute 09/21/2017 0.95  0.20 - 1.20 10*3/mm3 Final   • Eosinophils, Absolute 09/21/2017 0.00  0.00 - 0.70 10*3/mm3 Final   • Basophils, Absolute 09/21/2017 0.04  0.00 - 0.20 10*3/mm3 Final   • Immature Grans, Absolute 09/21/2017 0.04* 0.00 - 0.03 10*3/mm3 Final   • nRBC 09/21/2017 0.0  0.0 - 0.0 /100 WBC Final   • RBC Agglutination 09/21/2017 Mod/2+  None Seen Final   • WBC Morphology 09/21/2017 Normal  Normal Final   • Platelet Morphology 09/21/2017 Normal  Normal Final

## 2017-10-20 NOTE — PROGRESS NOTES
QUICK REFERENCE INFORMATION:  The ABCs of the Annual Wellness Visit    Initial Medicare Wellness Visit    HEALTH RISK ASSESSMENT    8/7/1930    Recent Hospitalizations:  [facility]        Current Medical Providers:  Patient Care Team:  Matt Rose MD as PCP - General (Family Medicine)  Marcie Robbins MD as Consulting Physician (Cardiology)  Nilesh Danielle MD as Consulting Physician (Urology)        Smoking Status:  History   Smoking Status   • Never Smoker   Smokeless Tobacco   • Never Used     Comment: no caffiene       Alcohol Consumption:  History   Alcohol Use   • Yes     Comment: occasional       Depression Screen:   PHQ-2/PHQ-9 Depression Screening 10/20/2017   Little interest or pleasure in doing things 0   Feeling down, depressed, or hopeless 0   Trouble falling or staying asleep, or sleeping too much -   Feeling tired or having little energy -   Poor appetite or overeating -   Feeling bad about yourself - or that you are a failure or have let yourself or your family down -   Trouble concentrating on things, such as reading the newspaper or watching television -   Moving or speaking so slowly that other people could have noticed. Or the opposite - being so fidgety or restless that you have been moving around a lot more than usual -   Thoughts that you would be better off dead, or of hurting yourself in some way -   Total Score 0   If you checked off any problems, how difficult have these problems made it for you to do your work, take care of things at home, or get along with other people? -       Health Habits and Functional and Cognitive Screening:  Functional & Cognitive Status 10/20/2017   Do you have difficulty preparing food and eating? No   Do you have difficulty bathing yourself? No   Do you have difficulty getting dressed? No   Do you have difficulty using the toilet? No   Do you have difficulty moving around from place to place? No   In the past year have you fallen or experienced  a near fall? Yes   Do you need help using the phone?  No   Are you deaf or do you have serious difficulty hearing?  No   Do you need help with transportation? No   Do you need help shopping? No   Do you need help preparing meals?  No   Do you need help with housework?  No   Do you need help with laundry? No   Do you need help taking your medications? No   Do you need help managing money? No   Do you have difficulty concentrating, remembering or making decisions? No       Health Habits  Current Diet: Well Balanced Diet  Dental Exam: Up to date  Eye Exam: Up to date  Exercise (times per week): 5 times per week  Current Exercise Activities Include: Stationary Bicycling/Spin Class          Does the patient have evidence of cognitive impairment? No    Asiprin use counseling: Contraindicated from taking ASA      Recent Lab Results:    Visual Acuity:  No exam data present    Age-appropriate Screening Schedule:  Refer to the list below for future screening recommendations based on patient's age, sex and/or medical conditions. Orders for these recommended tests are listed in the plan section. The patient has been provided with a written plan.    Health Maintenance   Topic Date Due   • TDAP/TD VACCINES (1 - Tdap) 08/07/1949   • ZOSTER VACCINE  04/02/2017   • LIPID PANEL  01/17/2018   • INFLUENZA VACCINE  Completed   • PNEUMOCOCCAL VACCINES (65+ LOW/MEDIUM RISK)  Excluded        Subjective   History of Present Illness    Agnieszka Rizzo is a 87 y.o. female who presents for an Annual Wellness Visit.    The following portions of the patient's history were reviewed and updated as appropriate: allergies, current medications, past family history, past medical history, past social history, past surgical history and problem list.    Outpatient Medications Prior to Visit   Medication Sig Dispense Refill   • acetylcysteine (MUCOMYST) 20 % nebulizer solution Take 4 mL by nebulization 4 (Four) Times a Day. (Patient taking differently:  Take 4 mL by nebulization 2 (Two) Times a Day.) 500 mL 1   • ADVAIR -21 MCG/ACT inhaler Inhale 2 puffs 2 (Two) Times a Day. 1 inhaler 0   • atorvastatin (LIPITOR) 40 MG tablet Take 1 tablet by mouth Daily. 90 tablet 3   • calcium carbonate (OS-EVIN) 600 MG tablet Take 600 mg by mouth Daily.     • cetirizine (zyrTEC) 10 MG tablet Take 10 mg by mouth Daily.     • cholecalciferol (VITAMIN D3) 1000 UNITS tablet Take 1,000 Units by mouth Daily.     • citalopram (CeleXA) 20 MG tablet Take 1 tablet by mouth Daily. 90 tablet 3   • digoxin (LANOXIN) 125 MCG tablet Take 1 tablet by mouth Daily. 90 tablet 3   • ezetimibe (ZETIA) 10 MG tablet Take 1 tablet by mouth Daily. 90 tablet 3   • ferrous sulfate 325 (65 FE) MG tablet TAKE 1 TABLET BY MOUTH THREE TIMES DAILY WITH MEALS 90 tablet 0   • glucosamine-chondroitin 500-400 MG capsule capsule Take 1 capsule by mouth Daily As Needed.     • GuaiFENesin (MUCINEX PO) Take 2 tablets by mouth Daily. In the afternoon     • Lactobacillus (ACIDOPHILUS PO) Take 2 tablets by mouth Daily.     • potassium chloride (K-DUR,KLOR-CON) 10 MEQ CR tablet Take 10 mEq by mouth Daily.     • torsemide (DEMADEX) 20 MG tablet Take 1 tablet by mouth 2 (Two) Times a Day. 360 tablet 3   • verapamil SR (CALAN-SR) 240 MG CR tablet Take 240 mg by mouth Every Night.     • dabigatran etexilate (PRADAXA) 150 MG capsu Take 150 mg by mouth 2 (Two) Times a Day.     • doxycycline (ADOXA) 100 MG tablet 1 tab po BID until completed for infection 14 tablet 0   • VERAMYST 27.5 MCG/SPRAY nasal spray 1 spray into each nostril 2 (Two) Times a Day.       No facility-administered medications prior to visit.        Patient Active Problem List   Diagnosis   • Pneumothorax of right lung after biopsy   • Pulmonary aspergillosis   • Benign essential hypertension   • Chronic coronary artery disease   • Hyperlipidemia   • Mitral valve insufficiency   • Ventricular premature beats   • Ventricular tachycardia   • Chronic atrial  "fibrillation   • Pneumonia with the fungal infection aspergillosis   • Acid-fast bacteria present   • Hyponatremia   • DNR (do not resuscitate)   • Sepsis   • Chronic diastolic CHF (congestive heart failure)   • CHF (congestive heart failure)   • Asymptomatic bacteriuria   • Iron deficiency anemia   • Hypokalemia   • Bronchiectasis   • Pneumonia of both lungs due to infectious organism   • KINA (mycobacterium avium-intracellulare)   • Atrial fibrillation with rapid ventricular response   • Anxiety       Advance Care Planning:  power of  for healthcare on file    Identification of Risk Factors:  Risk factors include: weight .    Review of Systems    Compared to one year ago, the patient feels her physical health is worse.  Compared to one year ago, the patient feels her mental health is the same.    Objective     Physical Exam    Vitals:    10/20/17 1033   BP: 124/64   BP Location: Right arm   Patient Position: Sitting   Cuff Size: Adult   Pulse: 60   Temp: 97.7 °F (36.5 °C)   TempSrc: Oral   SpO2: 98%   Weight: 120 lb 9.6 oz (54.7 kg)   Height: 63\" (160 cm)   PainSc:   2       Body mass index is 21.36 kg/(m^2).  Discussed the patient's BMI with her. The BMI is below average; BMI management plan is completed.    Assessment/Plan   Patient Self-Management and Personalized Health Advice  The patient has been provided with information about: diet, weight management and prevention of cardiac or vascular disease and preventive services including:   · TdaP vaccine, Zostavax vaccine (Herpes Zoster).    Visit Diagnoses:    ICD-10-CM ICD-9-CM   1. Anxiety F41.9 300.00   2. Iron deficiency anemia, unspecified iron deficiency anemia type D50.9 280.9   3. Benign essential hypertension I10 401.1       No orders of the defined types were placed in this encounter.      Outpatient Encounter Prescriptions as of 10/20/2017   Medication Sig Dispense Refill   • acetylcysteine (MUCOMYST) 20 % nebulizer solution Take 4 mL by " nebulization 4 (Four) Times a Day. (Patient taking differently: Take 4 mL by nebulization 2 (Two) Times a Day.) 500 mL 1   • ADVAIR -21 MCG/ACT inhaler Inhale 2 puffs 2 (Two) Times a Day. 1 inhaler 0   • atorvastatin (LIPITOR) 40 MG tablet Take 1 tablet by mouth Daily. 90 tablet 3   • calcium carbonate (OS-EVIN) 600 MG tablet Take 600 mg by mouth Daily.     • cetirizine (zyrTEC) 10 MG tablet Take 10 mg by mouth Daily.     • cholecalciferol (VITAMIN D3) 1000 UNITS tablet Take 1,000 Units by mouth Daily.     • citalopram (CeleXA) 20 MG tablet Take 1 tablet by mouth Daily. 90 tablet 3   • digoxin (LANOXIN) 125 MCG tablet Take 1 tablet by mouth Daily. 90 tablet 3   • ezetimibe (ZETIA) 10 MG tablet Take 1 tablet by mouth Daily. 90 tablet 3   • ferrous sulfate 325 (65 FE) MG tablet TAKE 1 TABLET BY MOUTH THREE TIMES DAILY WITH MEALS 90 tablet 0   • glucosamine-chondroitin 500-400 MG capsule capsule Take 1 capsule by mouth Daily As Needed.     • GuaiFENesin (MUCINEX PO) Take 2 tablets by mouth Daily. In the afternoon     • Lactobacillus (ACIDOPHILUS PO) Take 2 tablets by mouth Daily.     • potassium chloride (K-DUR,KLOR-CON) 10 MEQ CR tablet Take 10 mEq by mouth Daily.     • torsemide (DEMADEX) 20 MG tablet Take 1 tablet by mouth 2 (Two) Times a Day. 360 tablet 3   • verapamil PM (VERELAN PM) 360 MG 24 hr capsule Take 360 mg by mouth Every Night.     • warfarin (COUMADIN) 3 MG tablet 3 mg alt 4.5 mg     • [DISCONTINUED] verapamil SR (CALAN-SR) 240 MG CR tablet Take 240 mg by mouth Every Night.     • fluticasone (FLONASE) 50 MCG/ACT nasal spray 2 sprays into each nostril Daily.     • [DISCONTINUED] dabigatran etexilate (PRADAXA) 150 MG capsu Take 150 mg by mouth 2 (Two) Times a Day.     • [DISCONTINUED] doxycycline (ADOXA) 100 MG tablet 1 tab po BID until completed for infection 14 tablet 0   • [DISCONTINUED] VERAMYST 27.5 MCG/SPRAY nasal spray 1 spray into each nostril 2 (Two) Times a Day.       No  facility-administered encounter medications on file as of 10/20/2017.        Reviewed use of high risk medication in the elderly: yes  Reviewed for potential of harmful drug interactions in the elderly: yes    Follow Up:  Zostavax due in January 2018    An After Visit Summary and PPPS with all of these plans were given to the patient.

## 2017-10-26 ENCOUNTER — HOSPITAL ENCOUNTER (OUTPATIENT)
Dept: CARDIOLOGY | Facility: HOSPITAL | Age: 82
Setting detail: RECURRING SERIES
Discharge: HOME | End: 2017-10-26

## 2017-10-26 PROCEDURE — 85610 PROTHROMBIN TIME: CPT

## 2017-10-26 PROCEDURE — 36416 COLLJ CAPILLARY BLOOD SPEC: CPT

## 2017-10-31 ENCOUNTER — TREATMENT (OUTPATIENT)
Dept: PHYSICAL THERAPY | Facility: CLINIC | Age: 82
End: 2017-10-31

## 2017-10-31 DIAGNOSIS — M75.42 SHOULDER IMPINGEMENT, LEFT: ICD-10-CM

## 2017-10-31 DIAGNOSIS — M25.512 ACUTE PAIN OF LEFT SHOULDER: Primary | ICD-10-CM

## 2017-10-31 PROCEDURE — 97140 MANUAL THERAPY 1/> REGIONS: CPT | Performed by: PHYSICAL THERAPIST

## 2017-10-31 PROCEDURE — 97110 THERAPEUTIC EXERCISES: CPT | Performed by: PHYSICAL THERAPIST

## 2017-10-31 NOTE — PROGRESS NOTES
Physical Therapy Daily Progress Note      Agnieszka AGUIRRE Matthias reports: compliance with gym strengthening HEP.     Objective       Active Range of Motion   Left Shoulder   Abduction: 132 degrees      See Exercise, Manual, and Modality Logs for complete treatment.     Assessment/Plan  Pt demonstrates significant improvement in left GH abduction. Instructed pt in progress gym strengthening program which pt performed with no increased pain. Pt demonstrates independence in HEP, no longer requires skilled PT services.   Other-d/c to HEP    Manual PT [id number] 10 minutes and Therapy Exercise [id number] 30 minutes    Timed Code Treatment: 40   Minutes     Total Treatment Time: 60      Minutes    Naomi Gabriel, PT, DPT  Physical Therapist #[id number]

## 2017-11-01 RX ORDER — WARFARIN SODIUM 3 MG/1
TABLET ORAL
Qty: 135 TABLET | Refills: 0 | Status: SHIPPED | OUTPATIENT
Start: 2017-11-01 | End: 2018-01-16 | Stop reason: SDUPTHER

## 2017-11-06 ENCOUNTER — TELEPHONE (OUTPATIENT)
Dept: INFECTIOUS DISEASES | Facility: CLINIC | Age: 82
End: 2017-11-06

## 2017-11-06 NOTE — TELEPHONE ENCOUNTER
She needs to take all of the ethambutol around the same time on MWF. Please reminder her that she needs to be on rifampin also.

## 2017-11-06 NOTE — TELEPHONE ENCOUNTER
Informed patient per Dr. Landry.  Patient states she is going to begin ethambutol today and then begin rifampin in 2 weeks.

## 2017-11-06 NOTE — TELEPHONE ENCOUNTER
Patient would like clarification for the following:  Patient states she began taking azithromycin 500mg on MWF and now 4 weeks later she is ready to begin taking ethambutol 400mg.  Patient would like to know if she can take ethambutol on T,TH,SAT or if it has to be on MWF as well.  Patient would also like to know if she can take the 3 ethambutol throughout the day or if she has to take the 3 tablets all at the same time.

## 2017-11-09 ENCOUNTER — HOSPITAL ENCOUNTER (OUTPATIENT)
Dept: CARDIOLOGY | Facility: HOSPITAL | Age: 82
Setting detail: RECURRING SERIES
Discharge: HOME | End: 2017-11-09

## 2017-11-09 PROCEDURE — 36416 COLLJ CAPILLARY BLOOD SPEC: CPT

## 2017-11-09 PROCEDURE — 85610 PROTHROMBIN TIME: CPT

## 2017-11-20 ENCOUNTER — RESULTS ENCOUNTER (OUTPATIENT)
Dept: INTERNAL MEDICINE | Facility: CLINIC | Age: 82
End: 2017-11-20

## 2017-11-20 DIAGNOSIS — R79.89 ABNORMAL CBC: ICD-10-CM

## 2017-11-21 ENCOUNTER — RESULTS ENCOUNTER (OUTPATIENT)
Dept: INTERNAL MEDICINE | Facility: CLINIC | Age: 82
End: 2017-11-21

## 2017-11-21 DIAGNOSIS — D64.9 ANEMIA, UNSPECIFIED TYPE: ICD-10-CM

## 2017-11-21 LAB
BASOPHILS # BLD AUTO: 0.03 10*3/MM3 (ref 0–0.2)
BASOPHILS NFR BLD AUTO: 0.3 % (ref 0–1.5)
DIFFERENTIAL COMMENT: NORMAL
EOSINOPHIL # BLD AUTO: 0.12 10*3/MM3 (ref 0–0.7)
EOSINOPHIL NFR BLD AUTO: 1.3 % (ref 0.3–6.2)
ERYTHROCYTE [DISTWIDTH] IN BLOOD BY AUTOMATED COUNT: 16.1 % (ref 11.7–13)
HCT VFR BLD AUTO: 39.3 % (ref 35.6–45.5)
HGB BLD-MCNC: 13.6 G/DL (ref 11.9–15.5)
IMM GRANULOCYTES # BLD: 0.03 10*3/MM3 (ref 0–0.03)
IMM GRANULOCYTES NFR BLD: 0.3 % (ref 0–0.5)
LYMPHOCYTES # BLD AUTO: 2.57 10*3/MM3 (ref 0.9–4.8)
LYMPHOCYTES NFR BLD AUTO: 28 % (ref 19.6–45.3)
MCH RBC QN AUTO: 35.5 PG (ref 26.9–32)
MCHC RBC AUTO-ENTMCNC: 34.6 G/DL (ref 32.4–36.3)
MCV RBC AUTO: 102.6 FL (ref 80.5–98.2)
MONOCYTES # BLD AUTO: 0.73 10*3/MM3 (ref 0.2–1.2)
MONOCYTES NFR BLD AUTO: 7.9 % (ref 5–12)
NEUTROPHILS # BLD AUTO: 5.71 10*3/MM3 (ref 1.9–8.1)
NEUTROPHILS NFR BLD AUTO: 62.2 % (ref 42.7–76)
PLATELET # BLD AUTO: 308 10*3/MM3 (ref 140–500)
PLATELET BLD QL SMEAR: NORMAL
RBC # BLD AUTO: 3.83 10*6/MM3 (ref 3.9–5.2)
RBC MORPH BLD: NORMAL
WBC # BLD AUTO: 9.19 10*3/MM3 (ref 4.5–10.7)

## 2017-11-29 ENCOUNTER — TELEPHONE (OUTPATIENT)
Dept: INTERNAL MEDICINE | Facility: CLINIC | Age: 82
End: 2017-11-29

## 2017-11-29 ENCOUNTER — OFFICE VISIT (OUTPATIENT)
Dept: INFECTIOUS DISEASES | Facility: CLINIC | Age: 82
End: 2017-11-29

## 2017-11-29 ENCOUNTER — APPOINTMENT (OUTPATIENT)
Dept: LAB | Facility: HOSPITAL | Age: 82
End: 2017-11-29

## 2017-11-29 VITALS
SYSTOLIC BLOOD PRESSURE: 127 MMHG | HEART RATE: 79 BPM | WEIGHT: 121.8 LBS | HEIGHT: 63 IN | TEMPERATURE: 98 F | DIASTOLIC BLOOD PRESSURE: 73 MMHG | BODY MASS INDEX: 21.58 KG/M2

## 2017-11-29 DIAGNOSIS — Z79.2 LONG TERM (CURRENT) USE OF ANTIBIOTICS: ICD-10-CM

## 2017-11-29 DIAGNOSIS — A04.72 C. DIFFICILE COLITIS: ICD-10-CM

## 2017-11-29 DIAGNOSIS — A31.0 PULMONARY MYCOBACTERIUM AVIUM COMPLEX (MAC) INFECTION (HCC): Primary | ICD-10-CM

## 2017-11-29 DIAGNOSIS — I48.20 CHRONIC ATRIAL FIBRILLATION (HCC): ICD-10-CM

## 2017-11-29 LAB
ALBUMIN SERPL-MCNC: 4.3 G/DL (ref 3.5–5.2)
ALBUMIN/GLOB SERPL: 1.3 G/DL
ALP SERPL-CCNC: 99 U/L (ref 39–117)
ALT SERPL W P-5'-P-CCNC: 31 U/L (ref 1–33)
ANION GAP SERPL CALCULATED.3IONS-SCNC: 14.4 MMOL/L
AST SERPL-CCNC: 33 U/L (ref 1–32)
BILIRUB SERPL-MCNC: 0.4 MG/DL (ref 0.1–1.2)
BUN BLD-MCNC: 17 MG/DL (ref 8–23)
BUN/CREAT SERPL: 21.3 (ref 7–25)
CALCIUM SPEC-SCNC: 10 MG/DL (ref 8.6–10.5)
CHLORIDE SERPL-SCNC: 96 MMOL/L (ref 98–107)
CO2 SERPL-SCNC: 28.6 MMOL/L (ref 22–29)
CREAT BLD-MCNC: 0.8 MG/DL (ref 0.57–1)
GFR SERPL CREATININE-BSD FRML MDRD: 68 ML/MIN/1.73
GLOBULIN UR ELPH-MCNC: 3.2 GM/DL
GLUCOSE BLD-MCNC: 95 MG/DL (ref 65–99)
POTASSIUM BLD-SCNC: 3.7 MMOL/L (ref 3.5–5.2)
PROT SERPL-MCNC: 7.5 G/DL (ref 6–8.5)
SODIUM BLD-SCNC: 139 MMOL/L (ref 136–145)

## 2017-11-29 PROCEDURE — 99214 OFFICE O/P EST MOD 30 MIN: CPT | Performed by: INTERNAL MEDICINE

## 2017-11-29 PROCEDURE — 36415 COLL VENOUS BLD VENIPUNCTURE: CPT | Performed by: INTERNAL MEDICINE

## 2017-11-29 PROCEDURE — 80053 COMPREHEN METABOLIC PANEL: CPT | Performed by: INTERNAL MEDICINE

## 2017-11-29 RX ORDER — AZITHROMYCIN 500 MG/1
TABLET, FILM COATED ORAL
COMMUNITY
Start: 2017-11-22 | End: 2018-04-20

## 2017-11-29 RX ORDER — RIFAMPIN 300 MG/1
CAPSULE ORAL
COMMUNITY
End: 2018-10-19 | Stop reason: SDUPTHER

## 2017-11-29 RX ORDER — ETHAMBUTOL HYDROCHLORIDE 400 MG/1
TABLET, FILM COATED ORAL
COMMUNITY
Start: 2017-11-22 | End: 2018-10-19 | Stop reason: SDUPTHER

## 2017-11-29 NOTE — PROGRESS NOTES
"CC: f/u pulmonary MAC    History from patient.    HPI: Agnieszka Rizzo is a 87 y.o. female here for f/u pulmonary MAC. She started her regimen late September 2017 but stopped abruptly due to GI upset. Then in early October she saw pulmonary in clinic who strongly encouraged her to restart the regimen. Unfortunately, she took azithromycin alone for about a month before letting me know this was how she was taking the medications. I t told her to start both the rifampin and the ethambutol immediately so as not to develop resistance. Unfortunately she was instructed elsewhere to start each medication 4 weeks apart. She was on all 3 medications for the first time as of 11/27/17. She says that she is tolerating the medications well with minimal GI side effects. She is pleased with her tolerance. She is working out at the [facility] and doing other exercise classes. She has intermittent cough. She says her nebulizer and chest vibratory therapy are helping to clear the mucus from her lungs.    In terms of C diff, her stools are now formed. She continues on a probiotic.    Regarding her Afib, she is currently anti-coagulated on warfarin. She thinks she is now in a regular rhythm based on her last appt with Dr Robbins.     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 LVQ - \"it just makes me sick\"  2. PCN (tolerates CTX and cefepime) - rash > 60 years ago  3. Erythromycin - tolerates azithromycin    Medications:   Current Outpatient Prescriptions:   •  acetylcysteine (MUCOMYST) 20 % nebulizer solution, Take 4 mL by nebulization 4 (Four) Times a Day. (Patient taking " "differently: Take 4 mL by nebulization 2 (Two) Times a Day.), Disp: 500 mL, Rfl: 1  •  ADVAIR -21 MCG/ACT inhaler, Inhale 2 puffs 2 (Two) Times a Day., Disp: 1 inhaler, Rfl: 0  •  atorvastatin (LIPITOR) 40 MG tablet, Take 1 tablet by mouth Daily., Disp: 90 tablet, Rfl: 3  •  calcium carbonate (OS-EVIN) 600 MG tablet, Take 600 mg by mouth Daily., Disp: , Rfl:   •  cetirizine (zyrTEC) 10 MG tablet, Take 10 mg by mouth Daily., Disp: , Rfl:   •  cholecalciferol (VITAMIN D3) 1000 UNITS tablet, Take 1,000 Units by mouth Daily., Disp: , Rfl:   •  citalopram (CeleXA) 20 MG tablet, Take 1 tablet by mouth Daily., Disp: 90 tablet, Rfl: 3  •  digoxin (LANOXIN) 125 MCG tablet, Take 1 tablet by mouth Daily., Disp: 90 tablet, Rfl: 3  •  ezetimibe (ZETIA) 10 MG tablet, Take 1 tablet by mouth Daily., Disp: 90 tablet, Rfl: 3  •  ferrous sulfate 325 (65 FE) MG tablet, TAKE 1 TABLET BY MOUTH THREE TIMES DAILY WITH MEALS, Disp: 90 tablet, Rfl: 0  •  fluticasone (FLONASE) 50 MCG/ACT nasal spray, 2 sprays into each nostril Daily., Disp: , Rfl:   •  glucosamine-chondroitin 500-400 MG capsule capsule, Take 1 capsule by mouth Daily As Needed., Disp: , Rfl:   •  GuaiFENesin (MUCINEX PO), Take 2 tablets by mouth Daily. In the afternoon, Disp: , Rfl:   •  Lactobacillus (ACIDOPHILUS PO), Take 2 tablets by mouth Daily., Disp: , Rfl:   •  potassium chloride (K-DUR,KLOR-CON) 10 MEQ CR tablet, Take 10 mEq by mouth Daily., Disp: , Rfl:   •  torsemide (DEMADEX) 20 MG tablet, Take 1 tablet by mouth 2 (Two) Times a Day., Disp: 360 tablet, Rfl: 3  •  verapamil PM (VERELAN PM) 360 MG 24 hr capsule, Take 360 mg by mouth Every Night., Disp: , Rfl:   •  warfarin (COUMADIN) 3 MG tablet, TAKE 1 AND 1/2 TABLETS EVERY DAY  OR AS DIRECTED, Disp: 135 tablet, Rfl: 0    OBJECTIVE:  /73  Pulse 79  Temp 98 °F (36.7 °C)  Ht 63\" (160 cm)  Wt 121 lb 12.8 oz (55.2 kg)  BMI 21.58 kg/m2    General: awake, alert, sitting in chair  Head: Normocephalic  Eyes: " R eye ptosis, PERRL, no scleral icterus  ENT: MMM, OP clear, no thrush.   Neck: Supple  Cardiovascular: NR, RR, no murmurs, rubs, or gallops; no LE edema  Respiratory: faint rales in LLL; normal WOB on RA  GI: Abdomen is soft, no  TTP, mildly distended  : no Long catheter present  Musculoskeletal: no joint abnormalities, normal musculature  Skin: No rashes, lesions, or embolic phenomenon  Neurological: Alert and oriented x 3, motor strength 4/5 in all four extremities  Psychiatric: Normal mood and affect   Vasc: no cyanosis      DIAGNOSTICS:  CBC, CMP reviewed today  Lab Results   Component Value Date    WBC 9.19 11/20/2017    HGB 13.6 11/20/2017    HCT 39.3 11/20/2017     11/20/2017     Lab Results   Component Value Date    GLUCOSE 97 09/21/2017    BUN 20 09/21/2017    CREATININE 0.88 09/21/2017    EGFRIFNONA 61 09/21/2017    EGFRIFAFRI 96 06/20/2017    BCR 22.7 09/21/2017    CO2 29.9 (H) 09/21/2017    CALCIUM 9.2 09/21/2017    ALBUMIN 4.00 09/21/2017    LABIL2 1.2 09/21/2017    AST 31 09/21/2017    ALT 22 09/21/2017     Lab Results   Component Value Date    INR 2.14 (H) 08/18/2017    INR 1.96 (H) 05/31/2017    INR 1.97 (H) 04/02/2017    PROTIME 23.2 (H) 08/18/2017    PROTIME 21.7 (H) 05/31/2017    PROTIME 21.7 (H) 04/02/2017     Microbiology:  11/12/2016: Bronch Cx + MAC  5/31 Sputum Cx: normal fortino  6/3 bronch culture: mixed fortino  6/3 bronch AFB: no AFB at 6 weeks  6/3 bronch fungal: rare fungal elements on gram stain      Radiology (personally reviewed):   CXR 9/28 with bronchiectasis      ASSESSMENT/PLAN:  1. 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  -I think MAC is this pathogen and Aspergillus' presence is a result of bronchiectasis  -continue inhalers and vest therapy per Dr Tucker's recommendations  -continue azithromycin 500 mg PO qMWF, ethambutol 1200 mg PO qMWF, and rifampin 600 mg PO qMWF through 11/27/2018  -she is now taking them correctly  -CMP today; just had  CBC done on 11/20/17 so no need to repeat so soon    2. C diff colitis  -previously treated; no current symptoms  -continue probiotic  -another episode of C diff would likely lead me to stopping MAC therapy    3. Chronic atrial fibrillation  -on warfarin instead of Pradaxa while on MAC treatment    4. Long term use of antibiotics    RTC 6 weeks for labs and follow-up

## 2017-11-30 ENCOUNTER — TELEPHONE (OUTPATIENT)
Dept: INFECTIOUS DISEASES | Facility: CLINIC | Age: 82
End: 2017-11-30

## 2017-11-30 ENCOUNTER — HOSPITAL ENCOUNTER (OUTPATIENT)
Dept: CARDIOLOGY | Facility: HOSPITAL | Age: 82
Setting detail: RECURRING SERIES
Discharge: HOME | End: 2017-11-30

## 2017-11-30 PROCEDURE — 36416 COLLJ CAPILLARY BLOOD SPEC: CPT

## 2017-11-30 PROCEDURE — 85610 PROTHROMBIN TIME: CPT

## 2017-11-30 NOTE — TELEPHONE ENCOUNTER
----- Message from Hayden Landry MD sent at 11/29/2017  2:33 PM EST -----  Please let patient know that liver tests are normal and she is safe to continue her MAC treatment as prescribed.

## 2017-11-30 NOTE — TELEPHONE ENCOUNTER
Patient called in while M.A was out of the office, gave Ms. Matthias Landry's message about normal liver tests and can continue MAC treatments. Christine

## 2017-12-11 RX ORDER — FERROUS SULFATE 325(65) MG
TABLET ORAL
Qty: 90 TABLET | Refills: 0 | Status: SHIPPED | OUTPATIENT
Start: 2017-12-11 | End: 2018-08-16

## 2017-12-11 RX ORDER — FERROUS SULFATE 325(65) MG
TABLET ORAL
Qty: 90 TABLET | Refills: 0 | Status: SHIPPED | OUTPATIENT
Start: 2017-12-11 | End: 2018-04-20 | Stop reason: SDUPTHER

## 2017-12-28 ENCOUNTER — HOSPITAL ENCOUNTER (OUTPATIENT)
Dept: CARDIOLOGY | Facility: HOSPITAL | Age: 82
Setting detail: RECURRING SERIES
Discharge: HOME | End: 2017-12-28

## 2017-12-28 PROCEDURE — 85610 PROTHROMBIN TIME: CPT

## 2017-12-28 PROCEDURE — 36416 COLLJ CAPILLARY BLOOD SPEC: CPT

## 2017-12-30 ENCOUNTER — APPOINTMENT (OUTPATIENT)
Dept: GENERAL RADIOLOGY | Facility: HOSPITAL | Age: 82
End: 2017-12-30

## 2017-12-30 PROCEDURE — 73130 X-RAY EXAM OF HAND: CPT | Performed by: GENERAL PRACTICE

## 2018-01-02 ENCOUNTER — TELEPHONE (OUTPATIENT)
Dept: INFECTIOUS DISEASES | Facility: CLINIC | Age: 83
End: 2018-01-02

## 2018-01-02 NOTE — TELEPHONE ENCOUNTER
Patient, Agnieszka Rizzo, states that she has been diagnosed with trigger finger and has an appointment with Ortho on 01/05/18.  Ms. Rizzo has been told that she will most likely receive a steroid injection during her visit; she would like to know if it is okay with Dr. Landry, considering her current abx treatment.

## 2018-01-03 ENCOUNTER — HOSPITAL ENCOUNTER (OUTPATIENT)
Dept: CARDIOLOGY | Facility: HOSPITAL | Age: 83
Setting detail: RECURRING SERIES
Discharge: HOME | End: 2018-01-03

## 2018-01-03 PROCEDURE — 36416 COLLJ CAPILLARY BLOOD SPEC: CPT

## 2018-01-03 PROCEDURE — 85610 PROTHROMBIN TIME: CPT

## 2018-01-05 ENCOUNTER — HOSPITAL ENCOUNTER (OUTPATIENT)
Dept: CARDIOLOGY | Facility: HOSPITAL | Age: 83
Setting detail: RECURRING SERIES
Discharge: HOME | End: 2018-01-05

## 2018-01-05 PROCEDURE — 36416 COLLJ CAPILLARY BLOOD SPEC: CPT

## 2018-01-05 PROCEDURE — 85610 PROTHROMBIN TIME: CPT

## 2018-01-08 ENCOUNTER — HOSPITAL ENCOUNTER (OUTPATIENT)
Dept: CARDIOLOGY | Facility: HOSPITAL | Age: 83
Setting detail: RECURRING SERIES
Discharge: HOME | End: 2018-01-08

## 2018-01-08 ENCOUNTER — TELEPHONE (OUTPATIENT)
Dept: CARDIOLOGY | Facility: HOSPITAL | Age: 83
End: 2018-01-08

## 2018-01-08 PROCEDURE — 85610 PROTHROMBIN TIME: CPT

## 2018-01-08 PROCEDURE — 36416 COLLJ CAPILLARY BLOOD SPEC: CPT

## 2018-01-11 ENCOUNTER — HOSPITAL ENCOUNTER (OUTPATIENT)
Dept: CARDIOLOGY | Facility: HOSPITAL | Age: 83
Setting detail: RECURRING SERIES
Discharge: HOME | End: 2018-01-11

## 2018-01-11 PROCEDURE — 36416 COLLJ CAPILLARY BLOOD SPEC: CPT

## 2018-01-11 PROCEDURE — 85610 PROTHROMBIN TIME: CPT

## 2018-01-16 RX ORDER — WARFARIN SODIUM 3 MG/1
TABLET ORAL
Qty: 180 TABLET | Refills: 0 | Status: SHIPPED | OUTPATIENT
Start: 2018-01-16 | End: 2018-01-22 | Stop reason: SDUPTHER

## 2018-01-17 RX ORDER — POTASSIUM CHLORIDE 750 MG/1
TABLET, EXTENDED RELEASE ORAL
Qty: 90 TABLET | Refills: 3 | Status: SHIPPED | OUTPATIENT
Start: 2018-01-17 | End: 2019-03-19 | Stop reason: SDUPTHER

## 2018-01-18 ENCOUNTER — HOSPITAL ENCOUNTER (OUTPATIENT)
Dept: CARDIOLOGY | Facility: HOSPITAL | Age: 83
Setting detail: RECURRING SERIES
Discharge: HOME | End: 2018-01-18

## 2018-01-18 PROCEDURE — 36416 COLLJ CAPILLARY BLOOD SPEC: CPT

## 2018-01-18 PROCEDURE — 85610 PROTHROMBIN TIME: CPT

## 2018-01-22 ENCOUNTER — TELEPHONE (OUTPATIENT)
Dept: CARDIOLOGY | Facility: HOSPITAL | Age: 83
End: 2018-01-22

## 2018-01-22 ENCOUNTER — HOSPITAL ENCOUNTER (OUTPATIENT)
Dept: CARDIOLOGY | Facility: HOSPITAL | Age: 83
Setting detail: RECURRING SERIES
Discharge: HOME | End: 2018-01-22

## 2018-01-22 PROCEDURE — 85610 PROTHROMBIN TIME: CPT

## 2018-01-22 PROCEDURE — 36416 COLLJ CAPILLARY BLOOD SPEC: CPT

## 2018-01-22 RX ORDER — WARFARIN SODIUM 5 MG/1
TABLET ORAL
Qty: 60 TABLET | Refills: 1 | Status: SHIPPED | OUTPATIENT
Start: 2018-01-22 | End: 2018-01-23 | Stop reason: SDUPTHER

## 2018-01-23 ENCOUNTER — OFFICE VISIT (OUTPATIENT)
Dept: INFECTIOUS DISEASES | Facility: CLINIC | Age: 83
End: 2018-01-23

## 2018-01-23 ENCOUNTER — APPOINTMENT (OUTPATIENT)
Dept: LAB | Facility: HOSPITAL | Age: 83
End: 2018-01-23

## 2018-01-23 VITALS
BODY MASS INDEX: 21.86 KG/M2 | DIASTOLIC BLOOD PRESSURE: 70 MMHG | HEIGHT: 63 IN | TEMPERATURE: 98 F | SYSTOLIC BLOOD PRESSURE: 145 MMHG | HEART RATE: 76 BPM | WEIGHT: 123.4 LBS

## 2018-01-23 DIAGNOSIS — A31.0 PULMONARY MYCOBACTERIUM AVIUM COMPLEX (MAC) INFECTION (HCC): Primary | ICD-10-CM

## 2018-01-23 DIAGNOSIS — A04.72 C. DIFFICILE COLITIS: ICD-10-CM

## 2018-01-23 DIAGNOSIS — Z79.2 LONG TERM (CURRENT) USE OF ANTIBIOTICS: ICD-10-CM

## 2018-01-23 DIAGNOSIS — I48.20 CHRONIC ATRIAL FIBRILLATION (HCC): ICD-10-CM

## 2018-01-23 LAB
ALBUMIN SERPL-MCNC: 4.2 G/DL (ref 3.5–5.2)
ALBUMIN/GLOB SERPL: 1.1 G/DL
ALP SERPL-CCNC: 75 U/L (ref 39–117)
ALT SERPL W P-5'-P-CCNC: 21 U/L (ref 1–33)
ANION GAP SERPL CALCULATED.3IONS-SCNC: 10.2 MMOL/L
AST SERPL-CCNC: 19 U/L (ref 1–32)
BASOPHILS # BLD AUTO: 0.03 10*3/MM3 (ref 0–0.2)
BASOPHILS NFR BLD AUTO: 0.3 % (ref 0–1.5)
BILIRUB SERPL-MCNC: 0.4 MG/DL (ref 0.1–1.2)
BUN BLD-MCNC: 16 MG/DL (ref 8–23)
BUN/CREAT SERPL: 20.5 (ref 7–25)
CALCIUM SPEC-SCNC: 9.1 MG/DL (ref 8.6–10.5)
CHLORIDE SERPL-SCNC: 95 MMOL/L (ref 98–107)
CO2 SERPL-SCNC: 32.8 MMOL/L (ref 22–29)
CREAT BLD-MCNC: 0.78 MG/DL (ref 0.57–1)
DEPRECATED RDW RBC AUTO: 53.3 FL (ref 37–54)
EOSINOPHIL # BLD AUTO: 0.1 10*3/MM3 (ref 0–0.7)
EOSINOPHIL NFR BLD AUTO: 1.1 % (ref 0.3–6.2)
ERYTHROCYTE [DISTWIDTH] IN BLOOD BY AUTOMATED COUNT: 14.5 % (ref 11.7–13)
GFR SERPL CREATININE-BSD FRML MDRD: 70 ML/MIN/1.73
GLOBULIN UR ELPH-MCNC: 3.8 GM/DL
GLUCOSE BLD-MCNC: 99 MG/DL (ref 65–99)
HCT VFR BLD AUTO: 36.4 % (ref 35.6–45.5)
HGB BLD-MCNC: 12.3 G/DL (ref 11.9–15.5)
IMM GRANULOCYTES # BLD: 0.05 10*3/MM3 (ref 0–0.03)
IMM GRANULOCYTES NFR BLD: 0.6 % (ref 0–0.5)
LYMPHOCYTES # BLD AUTO: 2.16 10*3/MM3 (ref 0.9–4.8)
LYMPHOCYTES NFR BLD AUTO: 23.8 % (ref 19.6–45.3)
MCH RBC QN AUTO: 34.2 PG (ref 26.9–32)
MCHC RBC AUTO-ENTMCNC: 33.8 G/DL (ref 32.4–36.3)
MCV RBC AUTO: 101.1 FL (ref 80.5–98.2)
MONOCYTES # BLD AUTO: 0.67 10*3/MM3 (ref 0.2–1.2)
MONOCYTES NFR BLD AUTO: 7.4 % (ref 5–12)
NEUTROPHILS # BLD AUTO: 6.08 10*3/MM3 (ref 1.9–8.1)
NEUTROPHILS NFR BLD AUTO: 66.8 % (ref 42.7–76)
PLAT MORPH BLD: NORMAL
PLATELET # BLD AUTO: 218 10*3/MM3 (ref 140–500)
PMV BLD AUTO: 9.5 FL (ref 6–12)
POTASSIUM BLD-SCNC: 3.6 MMOL/L (ref 3.5–5.2)
PROT SERPL-MCNC: 8 G/DL (ref 6–8.5)
RBC # BLD AUTO: 3.6 10*6/MM3 (ref 3.9–5.2)
RBC AGGLUT BLD QL: NORMAL
SODIUM BLD-SCNC: 138 MMOL/L (ref 136–145)
WBC MORPH BLD: NORMAL
WBC NRBC COR # BLD: 9.09 10*3/MM3 (ref 4.5–10.7)

## 2018-01-23 PROCEDURE — 85025 COMPLETE CBC W/AUTO DIFF WBC: CPT | Performed by: INTERNAL MEDICINE

## 2018-01-23 PROCEDURE — 99213 OFFICE O/P EST LOW 20 MIN: CPT | Performed by: INTERNAL MEDICINE

## 2018-01-23 PROCEDURE — 36415 COLL VENOUS BLD VENIPUNCTURE: CPT | Performed by: INTERNAL MEDICINE

## 2018-01-23 PROCEDURE — 85007 BL SMEAR W/DIFF WBC COUNT: CPT | Performed by: INTERNAL MEDICINE

## 2018-01-23 PROCEDURE — 80053 COMPREHEN METABOLIC PANEL: CPT | Performed by: INTERNAL MEDICINE

## 2018-01-23 NOTE — PROGRESS NOTES
"CC: f/u pulmonary MAC    History from patient.    HPI: Agnieszka Rizzo is a 87 y.o. female here for f/u pulmonary MAC. She remains on the 3 antibiotics and says \"I'm doing fine.\" She is not having any diarrhea or vision changes. She has a mild non-productive cough that is stable. She is not short of air with exertion. Her weight is actually up 5# since las visit.    In terms of C diff, her stools are now formed. She continues on a probiotic.    Regarding her Afib, she is currently anti-coagulated on warfarin which is being watched closely (twice weekly) by cardiology given drug interactions of antibiotics that can affect the INR. Unfortunately she says that it has been difficult to get into the correct range. The most recent check level yesterday was 1.1. She says the highest she has achieved is 1.7.     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 LVQ - \"it just makes me sick\"  2. PCN (tolerates CTX and cefepime) - rash > 60 years ago  3. Erythromycin - tolerates azithromycin    Medications:   Current Outpatient Prescriptions:   •  acetylcysteine (MUCOMYST) 20 % nebulizer solution, Take 4 mL by nebulization 4 (Four) Times a Day. (Patient taking differently: Take 4 mL by nebulization 2 (Two) Times a Day.), Disp: 500 mL, Rfl: 1  •  ADVAIR -21 MCG/ACT inhaler, Inhale 2 puffs 2 (Two) Times a Day., Disp: 1 inhaler, Rfl: 0  •  atorvastatin (LIPITOR) 40 MG tablet, Take 1 tablet by mouth Daily., Disp: 90 tablet, Rfl: 3  •  azithromycin (ZITHROMAX) 500 MG tablet, , Disp: , Rfl:   •  calcium carbonate (OS-EVIN) 600 MG tablet, Take 600 mg by mouth Daily., " "Disp: , Rfl:   •  cetirizine (zyrTEC) 10 MG tablet, Take 10 mg by mouth Daily., Disp: , Rfl:   •  cholecalciferol (VITAMIN D3) 1000 UNITS tablet, Take 1,000 Units by mouth Daily., Disp: , Rfl:   •  citalopram (CeleXA) 20 MG tablet, Take 1 tablet by mouth Daily., Disp: 90 tablet, Rfl: 3  •  digoxin (LANOXIN) 125 MCG tablet, Take 1 tablet by mouth Daily., Disp: 90 tablet, Rfl: 3  •  ethambutol (MYAMBUTOL) 400 MG tablet, , Disp: , Rfl:   •  ezetimibe (ZETIA) 10 MG tablet, Take 1 tablet by mouth Daily., Disp: 90 tablet, Rfl: 3  •  ferrous sulfate 325 (65 FE) MG tablet, TAKE 1 TABLET BY MOUTH THREE TIMES DAILY WITH MEALS, Disp: 90 tablet, Rfl: 0  •  ferrous sulfate 325 (65 FE) MG tablet, TAKE 1 TABLET BY MOUTH THREE TIMES DAILY WITH MEALS, Disp: 90 tablet, Rfl: 0  •  fluticasone (FLONASE) 50 MCG/ACT nasal spray, 2 sprays into each nostril Daily., Disp: , Rfl:   •  glucosamine-chondroitin 500-400 MG capsule capsule, Take 1 capsule by mouth Daily As Needed., Disp: , Rfl:   •  GuaiFENesin (MUCINEX PO), Take 2 tablets by mouth Daily. In the afternoon, Disp: , Rfl:   •  Lactobacillus (ACIDOPHILUS PO), Take 2 tablets by mouth Daily., Disp: , Rfl:   •  NON FORMULARY, Take 1 dose by mouth. Three times a week, MW for fungal lung infection, Disp: , Rfl:   •  potassium chloride (K-DUR,KLOR-CON) 10 MEQ CR tablet, TAKE 1 TABLET EVERY DAY, Disp: 90 tablet, Rfl: 3  •  rifAMPin (RIFADIN) 300 MG capsule, Take 300 mg by mouth., Disp: , Rfl:   •  torsemide (DEMADEX) 20 MG tablet, Take 1 tablet by mouth 2 (Two) Times a Day., Disp: 360 tablet, Rfl: 3  •  verapamil PM (VERELAN PM) 360 MG 24 hr capsule, Take 360 mg by mouth Every Night., Disp: , Rfl:   •  warfarin (COUMADIN) 5 MG tablet, Take 2 tablet daily or as directed, Disp: 60 tablet, Rfl: 1    OBJECTIVE:  /70  Pulse 76  Temp 98 °F (36.7 °C)  Ht 160 cm (62.99\")  Wt 56 kg (123 lb 6.4 oz)  BMI 21.86 kg/m2    General: awake, alert, NAD  Head: Normocephalic  Eyes: no scleral " icterus  ENT: MMM, OP clear, no thrush.   Neck: Supple  Cardiovascular: NR, RR, no murmurs, rubs, or gallops; no LE edema  Respiratory: faint rales in LLL; normal WOB on RA  GI: Abdomen is soft, no  TTP, mildly distended  : no Long catheter present  Musculoskeletal: no joint abnormalities, normal musculature  Skin: No rashes, lesions, or embolic phenomenon  Neurological: Alert and oriented x 3, motor strength 5/5 in all four extremities  Psychiatric: Normal mood and affect   Vasc: no cyanosis      DIAGNOSTICS:  CBC, CMP, INR reviewed today  Lab Results   Component Value Date    WBC 9.19 11/20/2017    HGB 13.6 11/20/2017    HCT 39.3 11/20/2017     11/20/2017     Lab Results   Component Value Date    GLUCOSE 95 11/29/2017    BUN 17 11/29/2017    CREATININE 0.80 11/29/2017    EGFRIFNONA 68 11/29/2017    EGFRIFAFRI 96 06/20/2017    BCR 21.3 11/29/2017    CO2 28.6 11/29/2017    CALCIUM 10.0 11/29/2017    ALBUMIN 4.30 11/29/2017    LABIL2 1.3 11/29/2017    AST 33 (H) 11/29/2017    ALT 31 11/29/2017     Lab Results   Component Value Date    INR 2.14 (H) 08/18/2017    INR 1.96 (H) 05/31/2017    INR 1.97 (H) 04/02/2017    PROTIME 23.2 (H) 08/18/2017    PROTIME 21.7 (H) 05/31/2017    PROTIME 21.7 (H) 04/02/2017     Microbiology:  11/12/2016: Bronch Cx + MAC  5/31 Sputum Cx: normal fortino  6/3 bronch culture: mixed fortino  6/3 bronch AFB: no AFB at 6 weeks  6/3 bronch fungal: rare fungal elements on gram stain      Radiology (personally reviewed):   No new imaging since last visit      ASSESSMENT/PLAN:  1. 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  -I think MAC is the pathogen and Aspergillus' presence is a result of bronchiectasis  -continue inhalers and vest therapy per Dr Tucker's recommendations  -continue azithromycin 500 mg PO qMWF, ethambutol 1200 mg PO qMWF, and rifampin 600 mg PO qMWF through 11/27/2018  -she is now taking them correctly  -check CBC and CMP today for monitoring    2.  C diff colitis  -previously treated; no current symptoms  -continue probiotic  -another episode of C diff would likely lead me to stopping MAC therapy    3. Chronic atrial fibrillation  -on warfarin instead of Pradaxa while on MAC treatment  -thanks to cardiology for managing especially in context of antibiotics that affect the INR    4. Long term use of antibiotics  -check CBC    RTC 3 months for labs and follow-up

## 2018-01-24 ENCOUNTER — TELEPHONE (OUTPATIENT)
Dept: INFECTIOUS DISEASES | Facility: CLINIC | Age: 83
End: 2018-01-24

## 2018-01-24 RX ORDER — WARFARIN SODIUM 5 MG/1
TABLET ORAL
Qty: 180 TABLET | Refills: 1 | Status: SHIPPED | OUTPATIENT
Start: 2018-01-24 | End: 2018-12-06 | Stop reason: SDUPTHER

## 2018-01-24 NOTE — TELEPHONE ENCOUNTER
----- Message from Hayden Landry MD sent at 1/24/2018  6:58 AM EST -----  Please let patient know labs are acceptable. Continue current antibiotics.

## 2018-01-25 ENCOUNTER — TELEPHONE (OUTPATIENT)
Dept: CARDIOLOGY | Facility: CLINIC | Age: 83
End: 2018-01-25

## 2018-01-25 NOTE — TELEPHONE ENCOUNTER
"Pt calls and reports she comes in tomorrow for her INR, however she wanted to know if someone could \"listen to her heart\" to make sure she is still in rhythm, she has not symptoms and does not feel like she is in A-fib but she states she is going out of town for a few days and wants to make sure everything is ok. Please advise if you would like to add her on for an EKG only visit  "

## 2018-01-26 ENCOUNTER — HOSPITAL ENCOUNTER (OUTPATIENT)
Dept: CARDIOLOGY | Facility: HOSPITAL | Age: 83
Setting detail: RECURRING SERIES
Discharge: HOME | End: 2018-01-26

## 2018-01-26 PROCEDURE — 85610 PROTHROMBIN TIME: CPT

## 2018-01-26 PROCEDURE — 36416 COLLJ CAPILLARY BLOOD SPEC: CPT

## 2018-02-02 ENCOUNTER — HOSPITAL ENCOUNTER (OUTPATIENT)
Dept: CARDIOLOGY | Facility: HOSPITAL | Age: 83
Setting detail: RECURRING SERIES
Discharge: HOME | End: 2018-02-02

## 2018-02-02 ENCOUNTER — CLINICAL SUPPORT (OUTPATIENT)
Dept: CARDIOLOGY | Facility: CLINIC | Age: 83
End: 2018-02-02

## 2018-02-02 DIAGNOSIS — R00.2 PALPITATION: Primary | ICD-10-CM

## 2018-02-02 PROCEDURE — 36416 COLLJ CAPILLARY BLOOD SPEC: CPT

## 2018-02-02 PROCEDURE — 85610 PROTHROMBIN TIME: CPT

## 2018-02-02 PROCEDURE — 93000 ELECTROCARDIOGRAM COMPLETE: CPT | Performed by: INTERNAL MEDICINE

## 2018-02-02 NOTE — PROGRESS NOTES
Procedure     ECG 12 Lead  Date/Time: 2/2/2018 10:58 AM  Performed by: ANGELI STODDARD  Authorized by: ANGELI STODDARD   Comparison: compared with previous ECG from 9/21/2017  Similar to previous ECG  Rhythm: sinus rhythm  BPM: 69  Conduction: conduction normal  ST Segments: ST segments normal  T Waves: T waves normal  Clinical impression: normal ECG           Pt came in for an EKG today, she suspected she may be back in afib, EKG performed & I had Renita Vaughan's check that she was in rhythm & let patient leave. Will put EKG in your in box for interpretation. dmk

## 2018-02-26 ENCOUNTER — TELEPHONE (OUTPATIENT)
Dept: INFECTIOUS DISEASES | Facility: CLINIC | Age: 83
End: 2018-02-26

## 2018-02-26 NOTE — ED NOTES
Chief Complaint   Patient presents with   • Establish Care     SUBJECTIVE:    Zelda De Luna, is a 34 year old female who is here today to establish care. Pt with history of HTN and has been treated since about age 30. She previously was on a different medication(uncertain which-after outside records reviewed and notes on chart \"ace inhibitor cough\" 9/30/2014-may have been with Losartan ) that made her cough and since has been on the Zebeta and reports that her BP's have been controlled. She does monitor pressures when she donates blood and also has a home cuff. She states that she has been maintained on her current OCP through her prior provider and talked about switching it at age 35 due to her HTN. Pt is also contemplating pregnancy and plans on wanting to start trying to conceive sometime this Summer. Pt has had previous pregnancy with a different partner at about age 25 that ended in a miscarriage at ~8 weeks gestation. Pt states that she has requested previous medical records to be sent to us, but unfortunately they are not currently available and not available in care everywhere. Pt believes her last blood work may have been around April 2017 when she had work up for chest pain. Pt states pain was related to reflux at that time and no problems since.        Past Medical History:   Diagnosis Date   • HTN (hypertension)     dxd age 30     History reviewed. No pertinent surgical history. Denies surgery    CURRENT MEDICATIONS:  Current Outpatient Prescriptions   Medication Sig Dispense Refill   • bisoprolol (ZEBETA) 5 MG tablet Take 5 mg by mouth daily.     • norethindrone-ethinyl estradiol and ferrous fumarate (BLISOVI 24 FE) 1-20 MG-MCG(24) tablet Take 1 tablet by mouth daily.       No current facility-administered medications for this visit.          ALLERGIES:   Allergen Reactions   • Triple Antibiotic [Bacitracin-Neomycin-Polymyxin] Other (See Comments)     Localized, topical reaction       There is no  NAUSEA, VOMITING & DIARRHEA X 1 MONTH. PT STATES SHE IS UNABLE TO EAT OR DRINK.     Elizabeth Dallas RN  04/02/17 3301     immunization history on file for this patient. Pt maiden name was Betito will attempt to check WIR.  Family History   Problem Relation Age of Onset   • Other Mother      well   • Hypertension Father    • Hyperlipidemia Father    • Gastrointestinal Sister      gallbladder( twin sister)   • Other Brother      well   • Cancer, Breast Maternal Grandmother 60   • Cancer, Lung Maternal Grandfather 70     smoker   • Dementia/Alzheimers Paternal Grandmother    • Hypertension Paternal Grandfather    • Heart Paternal Grandfather    • Cancer, Breast Maternal Aunt 40   • Cancer, Colon Paternal Uncle 60       SOCIAL HISTORY:  No tobacco. 3 alcohol beverages per week.  to Carlos who is a 7th grade  in [place]. 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 Pt graduated from [employer] with Business degree as did her twin sister. Runs half marathons and will be doing her  coming up.    TOBACCO HISTORY:  History   Smoking Status   • Never Smoker   Smokeless Tobacco   • Not on file       REVIEW OF SYSTEMS:  GENERAL:  Feels well , Denies fever, chills  HEART:  Denies chest pain, shortness of breath, palpitations, exertional symptoms.  LUNGS:  Denies cough, wheezing  EXTREMITIES:  Denies joint pain, muscle pain or swelling or edema  GYNECOLOGIC:  Menses regular on OC's. States were regular even without OC's.  Uncertain when last pap or pelvic exam may have been believes pap may be ~2 yrs ago.(reviewed outside records after visit and shows last pap/pelvic exam with negative cotesting 3/2015)      OBJECTIVE:  VITAL SIGNS:    Visit Vitals  /82   Pulse 60 Comment: radial   Ht 5' 9\" (1.753 m)   Wt 111 kg   BMI 36.14 kg/m²     GENERAL:  Alert, well in appearance, pleasant, not in any distress.  SKIN:  Warm, dry, without rash   HEENT: Sclerae and conjunctivae clear.   NECK: Supple without lymphadenopathy. No thyromegaly. No masses. No supraclavicular lymph nodes.  HEART: Regular rate without murmur, normal S1,  S2.  LUNGS: Clear to ascultation without wheezes, rales, or rhonchi.  Moving air well. Good symmetrical breath sounds.  ABDOMEN: Soft, obese, nontender, no masses, no hepatosplenomegaly.  EXTREMITIES: Pulses 2+ symmetrical. No pedal edema.  BREASTS: Pt deferred  PELVIC: Pt deferred      ASSESSMENT:  1. Essential hypertension    2. Encounter for surveillance of contraceptive pills        PLAN:   1. BP has been well controlled, though pt is still on combination OCP which I discussed that with HTN I would recommend POP or different method. She is aware of increased CV risks with combination pill. She is contemplating going off OCP in the next 4-5 months as would like to then attempt to conceive. Pt therefore would like to continue with her current OCP as she only has a few months before going off.  2. As she is planning pregnancy, would also recommend change of BP medication as soon as possible to pregnancy favorable medication with Labetalol. Pt just filled a 90 day script of her Bisoprolol and does not wish to make the change at this time. We discussed that it can take some time to make sure BP is regulated with the change and I would like BP controlled before she attempts to conceive. Pt has elected to continue her current medication, but we will revisit this at a 3 mo follow up appointment. Will plan on direct switch to labetalol 100 mg bid. She will also consider pelvic exam at that time as we found last was ~2015.   3. Pt states that she does not need any refills of her medication at this time. We also had pt have her CMP and lipid panel today which were both wnl.    The supervising physician for services performed today is Xiomara Mahan MD

## 2018-02-26 NOTE — TELEPHONE ENCOUNTER
Pt states that she lost a tooth and has to have some dental work tomorrow.  She is wanting to make sure she that it is okay to do so and that she does not need to take any additional precautions considering the meds that she is taking for her MAC treatment.

## 2018-03-01 ENCOUNTER — TELEPHONE (OUTPATIENT)
Dept: CARDIOLOGY | Facility: CLINIC | Age: 83
End: 2018-03-01

## 2018-03-01 NOTE — TELEPHONE ENCOUNTER
Pt call to find out what to do with Warfarin  since she is have a tooth implant and tooth pulled.-sb

## 2018-03-01 NOTE — TELEPHONE ENCOUNTER
My preference is that she not stop anticoagulation.  If the dentist insists, she should hold her warfarin 3 days prior to procedure.

## 2018-03-12 ENCOUNTER — HOSPITAL ENCOUNTER (OUTPATIENT)
Dept: CARDIOLOGY | Facility: HOSPITAL | Age: 83
Setting detail: RECURRING SERIES
Discharge: HOME | End: 2018-03-12

## 2018-03-12 PROCEDURE — 36416 COLLJ CAPILLARY BLOOD SPEC: CPT

## 2018-03-12 PROCEDURE — 85610 PROTHROMBIN TIME: CPT

## 2018-03-27 RX ORDER — WARFARIN SODIUM 3 MG/1
TABLET ORAL
Qty: 180 TABLET | Refills: 0 | Status: SHIPPED | OUTPATIENT
Start: 2018-03-27 | End: 2018-06-12 | Stop reason: SDUPTHER

## 2018-04-03 DIAGNOSIS — E78.2 MIXED HYPERLIPIDEMIA: Primary | ICD-10-CM

## 2018-04-04 RX ORDER — EZETIMIBE 10 MG/1
TABLET ORAL
Qty: 90 TABLET | Refills: 3 | Status: SHIPPED | OUTPATIENT
Start: 2018-04-04 | End: 2019-04-11 | Stop reason: SDUPTHER

## 2018-04-04 RX ORDER — ATORVASTATIN CALCIUM 40 MG/1
TABLET, FILM COATED ORAL
Qty: 90 TABLET | Refills: 3 | Status: SHIPPED | OUTPATIENT
Start: 2018-04-04 | End: 2019-04-11 | Stop reason: SDUPTHER

## 2018-04-09 ENCOUNTER — HOSPITAL ENCOUNTER (OUTPATIENT)
Dept: CARDIOLOGY | Facility: HOSPITAL | Age: 83
Setting detail: RECURRING SERIES
Discharge: HOME | End: 2018-04-09
Admitting: INTERNAL MEDICINE

## 2018-04-09 ENCOUNTER — LAB (OUTPATIENT)
Dept: LAB | Facility: HOSPITAL | Age: 83
End: 2018-04-09

## 2018-04-09 DIAGNOSIS — E78.2 MIXED HYPERLIPIDEMIA: ICD-10-CM

## 2018-04-09 LAB
ALBUMIN SERPL-MCNC: 3.7 G/DL (ref 3.5–5.2)
ALBUMIN/GLOB SERPL: 1.2 G/DL
ALP SERPL-CCNC: 78 U/L (ref 39–117)
ALT SERPL W P-5'-P-CCNC: 23 U/L (ref 1–33)
ANION GAP SERPL CALCULATED.3IONS-SCNC: 12 MMOL/L
AST SERPL-CCNC: 23 U/L (ref 1–32)
BILIRUB SERPL-MCNC: 0.4 MG/DL (ref 0.1–1.2)
BUN BLD-MCNC: 13 MG/DL (ref 8–23)
BUN/CREAT SERPL: 18.3 (ref 7–25)
CALCIUM SPEC-SCNC: 9.4 MG/DL (ref 8.6–10.5)
CHLORIDE SERPL-SCNC: 98 MMOL/L (ref 98–107)
CHOLEST SERPL-MCNC: 242 MG/DL (ref 0–200)
CO2 SERPL-SCNC: 30 MMOL/L (ref 22–29)
CREAT BLD-MCNC: 0.71 MG/DL (ref 0.57–1)
GFR SERPL CREATININE-BSD FRML MDRD: 78 ML/MIN/1.73
GLOBULIN UR ELPH-MCNC: 3.2 GM/DL
GLUCOSE BLD-MCNC: 109 MG/DL (ref 65–99)
HDLC SERPL-MCNC: 70 MG/DL (ref 40–60)
LDLC SERPL CALC-MCNC: 133 MG/DL (ref 0–100)
LDLC/HDLC SERPL: 1.9 {RATIO}
POTASSIUM BLD-SCNC: 3.8 MMOL/L (ref 3.5–5.2)
PROT SERPL-MCNC: 6.9 G/DL (ref 6–8.5)
SODIUM BLD-SCNC: 140 MMOL/L (ref 136–145)
TRIGL SERPL-MCNC: 194 MG/DL (ref 0–150)
VLDLC SERPL-MCNC: 38.8 MG/DL (ref 5–40)

## 2018-04-09 PROCEDURE — 36415 COLL VENOUS BLD VENIPUNCTURE: CPT

## 2018-04-09 PROCEDURE — 36416 COLLJ CAPILLARY BLOOD SPEC: CPT

## 2018-04-09 PROCEDURE — 85610 PROTHROMBIN TIME: CPT

## 2018-04-09 PROCEDURE — 80061 LIPID PANEL: CPT

## 2018-04-09 PROCEDURE — 80053 COMPREHEN METABOLIC PANEL: CPT

## 2018-04-20 ENCOUNTER — OFFICE VISIT (OUTPATIENT)
Dept: INTERNAL MEDICINE | Facility: CLINIC | Age: 83
End: 2018-04-20

## 2018-04-20 VITALS
HEART RATE: 71 BPM | OXYGEN SATURATION: 96 % | SYSTOLIC BLOOD PRESSURE: 134 MMHG | DIASTOLIC BLOOD PRESSURE: 74 MMHG | HEIGHT: 63 IN | WEIGHT: 124.1 LBS | TEMPERATURE: 98.1 F | BODY MASS INDEX: 21.99 KG/M2

## 2018-04-20 DIAGNOSIS — Z20.5 EXPOSURE TO HEPATITIS A: Primary | ICD-10-CM

## 2018-04-20 PROCEDURE — 99213 OFFICE O/P EST LOW 20 MIN: CPT | Performed by: FAMILY MEDICINE

## 2018-04-20 RX ORDER — AZITHROMYCIN 500 MG/1
TABLET, FILM COATED ORAL
COMMUNITY
End: 2018-10-19 | Stop reason: SDUPTHER

## 2018-04-21 LAB — HAV AB SER QL IA: POSITIVE

## 2018-04-23 ENCOUNTER — TELEPHONE (OUTPATIENT)
Dept: INTERNAL MEDICINE | Facility: CLINIC | Age: 83
End: 2018-04-23

## 2018-04-23 NOTE — PROGRESS NOTES
Patient states she thinks she had it when she was 17 yo.  Is there anything she should do at this point?

## 2018-04-23 NOTE — TELEPHONE ENCOUNTER
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

## 2018-04-23 NOTE — PROGRESS NOTES
"Agnieszka Rizzo is a 87 y.o. female.      Assessment/Plan   Problem List Items Addressed This Visit        Other    Exposure to hepatitis A - Primary    Relevant Orders    Hepatitis A Antibody, Total (Completed)      Other Visit Diagnoses    None.          Follow up results of blood work.      Chief Complaint   Patient presents with   • follow up to iron deficiency anemia     Social History   Substance Use Topics   • Smoking status: Never Smoker   • Smokeless tobacco: Never Used      Comment: no caffiene   • Alcohol use Yes      Comment: occasional       History of Present Illness   Patient feels she had hepaitis A in the past and does not need immunization if she has antibody protection, presently assymptomatic.    The following portions of the patient's history were reviewed and updated as appropriate:PMHroutine: Social history , Past Medical History, Allergies, Current Medications, Active Problem List and Health Maintenance    Review of Systems   Constitutional: Negative.    Gastrointestinal: Negative.    Hematological: Negative.        Objective   Vitals:    04/20/18 0956   BP: 134/74   BP Location: Right arm   Patient Position: Sitting   Cuff Size: Adult   Pulse: 71   Temp: 98.1 °F (36.7 °C)   TempSrc: Oral   SpO2: 96%   Weight: 56.3 kg (124 lb 1.6 oz)   Height: 160 cm (62.99\")     Body mass index is 21.99 kg/m².  Physical Exam   Constitutional: She appears well-developed and well-nourished.   HENT:   Head: Normocephalic and atraumatic.   Eyes: Conjunctivae are normal. No scleral icterus.   Cardiovascular: Normal rate.    Pulmonary/Chest: Effort normal.   Nursing note and vitals reviewed.    Reviewed Data:  Office Visit on 04/20/2018   Component Date Value Ref Range Status   • Hep A Total Ab 04/21/2018 Positive* Negative Final   Lab on 04/09/2018   Component Date Value Ref Range Status   • Glucose 04/09/2018 109* 65 - 99 mg/dL Final   • BUN 04/09/2018 13  8 - 23 mg/dL Final   • Creatinine 04/09/2018 0.71  0.57 - " 1.00 mg/dL Final   • Sodium 04/09/2018 140  136 - 145 mmol/L Final   • Potassium 04/09/2018 3.8  3.5 - 5.2 mmol/L Final   • Chloride 04/09/2018 98  98 - 107 mmol/L Final   • CO2 04/09/2018 30.0* 22.0 - 29.0 mmol/L Final   • Calcium 04/09/2018 9.4  8.6 - 10.5 mg/dL Final   • Total Protein 04/09/2018 6.9  6.0 - 8.5 g/dL Final   • Albumin 04/09/2018 3.70  3.50 - 5.20 g/dL Final   • ALT (SGPT) 04/09/2018 23  1 - 33 U/L Final   • AST (SGOT) 04/09/2018 23  1 - 32 U/L Final   • Alkaline Phosphatase 04/09/2018 78  39 - 117 U/L Final   • Total Bilirubin 04/09/2018 0.4  0.1 - 1.2 mg/dL Final   • eGFR Non African Amer 04/09/2018 78  >60 mL/min/1.73 Final   • Globulin 04/09/2018 3.2  gm/dL Final   • A/G Ratio 04/09/2018 1.2  g/dL Final   • BUN/Creatinine Ratio 04/09/2018 18.3  7.0 - 25.0 Final   • Anion Gap 04/09/2018 12.0  mmol/L Final   • Total Cholesterol 04/09/2018 242* 0 - 200 mg/dL Final   • Triglycerides 04/09/2018 194* 0 - 150 mg/dL Final   • HDL Cholesterol 04/09/2018 70* 40 - 60 mg/dL Final   • LDL Cholesterol  04/09/2018 133* 0 - 100 mg/dL Final   • VLDL Cholesterol 04/09/2018 38.8  5 - 40 mg/dL Final   • LDL/HDL Ratio 04/09/2018 1.90   Final

## 2018-04-26 ENCOUNTER — OFFICE VISIT (OUTPATIENT)
Dept: INFECTIOUS DISEASES | Facility: CLINIC | Age: 83
End: 2018-04-26

## 2018-04-26 VITALS
HEIGHT: 63 IN | HEART RATE: 82 BPM | RESPIRATION RATE: 12 BRPM | BODY MASS INDEX: 22.15 KG/M2 | WEIGHT: 125 LBS | TEMPERATURE: 97.7 F | OXYGEN SATURATION: 98 % | DIASTOLIC BLOOD PRESSURE: 68 MMHG | SYSTOLIC BLOOD PRESSURE: 128 MMHG

## 2018-04-26 DIAGNOSIS — A04.72 C. DIFFICILE COLITIS: ICD-10-CM

## 2018-04-26 DIAGNOSIS — I48.20 CHRONIC ATRIAL FIBRILLATION (HCC): ICD-10-CM

## 2018-04-26 DIAGNOSIS — A31.0 PULMONARY MYCOBACTERIUM AVIUM COMPLEX (MAC) INFECTION (HCC): Primary | ICD-10-CM

## 2018-04-26 DIAGNOSIS — Z79.2 LONG TERM (CURRENT) USE OF ANTIBIOTICS: ICD-10-CM

## 2018-04-26 PROCEDURE — 99213 OFFICE O/P EST LOW 20 MIN: CPT | Performed by: INTERNAL MEDICINE

## 2018-04-26 NOTE — PROGRESS NOTES
"CC: f/u pulmonary MAC    HPI: Agnieszka Rizzo is a 87 y.o. female here for f/u pulmonary MAC. She remains on the 3 antibiotics and says she is tolerating them well. Her weight is up 2# since her last visit. She says her cough is improved but not resolved. She is no longer taking Mucinex. She continues to follow-up with Dr Tucker and follow his bronchiectasis care instructions.    In terms of C diff, her stools are currently formed. She continues on a probiotic.    Regarding her Afib, she is continues on the warfarin which is being watched closely on rifampin. Her warfarin dose is higher than normal due to the metabolization effect of rifampin.     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 LVQ - \"it just makes me sick\"  2. PCN (tolerates CTX and cefepime) - rash > 60 years ago  3. Erythromycin - tolerates azithromycin    Medications:   Current Outpatient Prescriptions:   •  acetylcysteine (MUCOMYST) 20 % nebulizer solution, Take 4 mL by nebulization 4 (Four) Times a Day. (Patient taking differently: Take 4 mL by nebulization 2 (Two) Times a Day.), Disp: 500 mL, Rfl: 1  •  ADVAIR -21 MCG/ACT inhaler, Inhale 2 puffs 2 (Two) Times a Day., Disp: 1 inhaler, Rfl: 0  •  atorvastatin (LIPITOR) 40 MG tablet, TAKE 1 TABLET EVERY DAY, Disp: 90 tablet, Rfl: 3  •  azithromycin (ZITHROMAX) 500 MG tablet, 1 tablet three times a week, Disp: , Rfl:   •  calcium carbonate (OS-EVIN) 600 MG tablet, Take 600 mg by mouth Daily., Disp: , Rfl:   •  cetirizine (zyrTEC) 10 MG tablet, Take 10 mg by mouth Daily., Disp: , Rfl:   •  cholecalciferol (VITAMIN D3) 1000 UNITS " tablet, Take 1,000 Units by mouth Daily., Disp: , Rfl:   •  citalopram (CeleXA) 20 MG tablet, Take 1 tablet by mouth Daily., Disp: 90 tablet, Rfl: 3  •  digoxin (LANOXIN) 125 MCG tablet, Take 1 tablet by mouth Daily., Disp: 90 tablet, Rfl: 3  •  ethambutol (MYAMBUTOL) 400 MG tablet, 3 tablets three times a week, Disp: , Rfl:   •  ezetimibe (ZETIA) 10 MG tablet, TAKE 1 TABLET EVERY DAY, Disp: 90 tablet, Rfl: 3  •  ferrous sulfate 325 (65 FE) MG tablet, TAKE 1 TABLET BY MOUTH THREE TIMES DAILY WITH MEALS (Patient taking differently: TAKE 1 TABLET BY MOUTH ONCE DAILY), Disp: 90 tablet, Rfl: 0  •  fluticasone (FLONASE) 50 MCG/ACT nasal spray, 2 sprays into each nostril Daily., Disp: , Rfl:   •  glucosamine-chondroitin 500-400 MG capsule capsule, Take 1 capsule by mouth Daily As Needed., Disp: , Rfl:   •  GuaiFENesin (MUCINEX PO), Take 2 tablets by mouth Daily As Needed. In the afternoon , Disp: , Rfl:   •  Lactobacillus (ACIDOPHILUS PO), Take 2 tablets by mouth Daily., Disp: , Rfl:   •  LITTLE NOSES SALINE NASAL MIST aerosol solution, 1 spray into each nostril 2 (Two) Times a Day., Disp: , Rfl:   •  potassium chloride (K-DUR,KLOR-CON) 10 MEQ CR tablet, TAKE 1 TABLET EVERY DAY, Disp: 90 tablet, Rfl: 3  •  rifAMPin (RIFADIN) 300 MG capsule, Two tablets three times a week, Disp: , Rfl:   •  torsemide (DEMADEX) 20 MG tablet, Take 1 tablet by mouth 2 (Two) Times a Day., Disp: 360 tablet, Rfl: 3  •  verapamil PM (VERELAN PM) 360 MG 24 hr capsule, Take 360 mg by mouth Every Night., Disp: , Rfl:   •  warfarin (COUMADIN) 3 MG tablet, TAKE 2 TABLETS BY MOUTH DAILY OR AS DIRECTED (Patient taking differently: TAKE 4 TABLETS ON TUESDAY THROUGH SUNDAY OF EACH WEEK), Disp: 180 tablet, Rfl: 0  •  warfarin (COUMADIN) 5 MG tablet, TAKE 2 TABLETS BY MOUTH DAILY OR AS DIRECTED (Patient taking differently: TAKE 3 TABLETS ON MONDAY OF EACH WEEK), Disp: 180 tablet, Rfl: 1    OBJECTIVE:  /68   Pulse 82   Temp 97.7 °F (36.5 °C)   Resp  "12   Ht 160 cm (63\")   Wt 56.7 kg (125 lb)   SpO2 98%   BMI 22.14 kg/m²     General: awake, alert, NAD  Head: Normocephalic  Eyes: no scleral icterus  ENT: MMM  Neck: Supple  Cardiovascular: NR, no LE edema  Respiratory: lungs are clear; normal WOB on RA  GI: Abdomen is soft, no TTP, mildly distended  : no Long catheter present  Musculoskeletal: no joint abnormalities, normal musculature  Skin: No rashes, lesions, or embolic phenomenon  Neurological: Alert and oriented x 3, motor strength 5/5 in all four extremities  Psychiatric: Normal mood and affect   Vasc: no cyanosis      DIAGNOSTICS:  CBC, CMP reviewed today  Lab Results   Component Value Date    WBC 9.09 01/23/2018    HGB 12.3 01/23/2018    HCT 36.4 01/23/2018     01/23/2018     Lab Results   Component Value Date    GLUCOSE 109 (H) 04/09/2018    BUN 13 04/09/2018    CREATININE 0.71 04/09/2018    EGFRIFNONA 78 04/09/2018    EGFRIFAFRI 96 06/20/2017    BCR 18.3 04/09/2018    CO2 30.0 (H) 04/09/2018    CALCIUM 9.4 04/09/2018    ALBUMIN 3.70 04/09/2018    LABIL2 1.2 04/09/2018    AST 23 04/09/2018    ALT 23 04/09/2018     Lab Results   Component Value Date    INR 2.14 (H) 08/18/2017    INR 1.96 (H) 05/31/2017    INR 1.97 (H) 04/02/2017    PROTIME 23.2 (H) 08/18/2017    PROTIME 21.7 (H) 05/31/2017    PROTIME 21.7 (H) 04/02/2017     Microbiology:  11/12/2016: Bronch Cx + MAC  5/31 Sputum Cx: normal fortino  6/3 bronch culture: mixed fortino  6/3 bronch AFB: no AFB at 6 weeks  6/3 bronch fungal: rare fungal elements on gram stain      Radiology (personally reviewed):   No new imaging since last visit    ASSESSMENT/PLAN:  1. 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 C diff colitis  -previously treated; no current symptoms  -continue probiotic  -another episode of C diff would likely lead me to stopping MAC therapy    3. Chronic atrial fibrillation  -on warfarin instead of Pradaxa while on MAC treatment  -thanks to cardiology for managing especially in context of antibiotics that affect the INR  -when she finishes the course of rifampin, she will need warfarin dose decreased dramatically so she won't have a bleed    4. Long term use of antibiotics  -recent CMP at PCP was normal    RTC 3 months for labs and follow-up

## 2018-05-07 ENCOUNTER — HOSPITAL ENCOUNTER (OUTPATIENT)
Dept: CARDIOLOGY | Facility: HOSPITAL | Age: 83
Setting detail: RECURRING SERIES
Discharge: HOME | End: 2018-05-07

## 2018-05-07 PROCEDURE — 36416 COLLJ CAPILLARY BLOOD SPEC: CPT

## 2018-05-07 PROCEDURE — 85610 PROTHROMBIN TIME: CPT

## 2018-05-12 NOTE — TELEPHONE ENCOUNTER
I have reviewed discharge instructions with the patient. The patient verbalized understanding. Patient left ED via Discharge Method: ambulatory to Home with daughter. Opportunity for questions and clarification provided. Patient given 0 scripts. To continue your aftercare when you leave the hospital, you may receive an automated call from our care team to check in on how you are doing. This is a free service and part of our promise to provide the best care and service to meet your aftercare needs.  If you have questions, or wish to unsubscribe from this service please call [phone]. Thank you for Choosing our [facility] Emergency Department. Info to Pt, expressed understanding

## 2018-06-01 ENCOUNTER — EPISODE CHANGES (OUTPATIENT)
Dept: CASE MANAGEMENT | Facility: OTHER | Age: 83
End: 2018-06-01

## 2018-06-04 ENCOUNTER — EPISODE CHANGES (OUTPATIENT)
Dept: CASE MANAGEMENT | Facility: OTHER | Age: 83
End: 2018-06-04

## 2018-06-11 ENCOUNTER — EPISODE CHANGES (OUTPATIENT)
Dept: CASE MANAGEMENT | Facility: OTHER | Age: 83
End: 2018-06-11

## 2018-06-12 ENCOUNTER — HOSPITAL ENCOUNTER (OUTPATIENT)
Dept: CARDIOLOGY | Facility: HOSPITAL | Age: 83
Setting detail: RECURRING SERIES
Discharge: HOME | End: 2018-06-12

## 2018-06-12 PROCEDURE — 36416 COLLJ CAPILLARY BLOOD SPEC: CPT

## 2018-06-12 PROCEDURE — 85610 PROTHROMBIN TIME: CPT

## 2018-06-12 RX ORDER — WARFARIN SODIUM 3 MG/1
TABLET ORAL
Qty: 180 TABLET | Refills: 0 | Status: SHIPPED | OUTPATIENT
Start: 2018-06-12 | End: 2018-08-31 | Stop reason: SDUPTHER

## 2018-06-19 ENCOUNTER — HOSPITAL ENCOUNTER (OUTPATIENT)
Dept: CARDIOLOGY | Facility: HOSPITAL | Age: 83
Setting detail: RECURRING SERIES
Discharge: HOME | End: 2018-06-19

## 2018-06-19 PROCEDURE — 85610 PROTHROMBIN TIME: CPT

## 2018-06-19 PROCEDURE — 36416 COLLJ CAPILLARY BLOOD SPEC: CPT

## 2018-06-22 RX ORDER — VERAPAMIL HYDROCHLORIDE 360 MG/1
CAPSULE, DELAYED RELEASE PELLETS ORAL
Qty: 90 CAPSULE | Refills: 0 | OUTPATIENT
Start: 2018-06-22

## 2018-06-22 RX ORDER — VERAPAMIL HYDROCHLORIDE 360 MG/1
360 CAPSULE, DELAYED RELEASE PELLETS ORAL NIGHTLY
Qty: 30 CAPSULE | Refills: 0 | Status: SHIPPED | OUTPATIENT
Start: 2018-06-22 | End: 2018-07-30 | Stop reason: SDUPTHER

## 2018-06-22 RX ORDER — CITALOPRAM 20 MG/1
20 TABLET ORAL DAILY
Qty: 90 TABLET | Refills: 0 | Status: SHIPPED | OUTPATIENT
Start: 2018-06-22 | End: 2018-10-19

## 2018-06-22 RX ORDER — DIGOXIN 125 UG/1
125 TABLET ORAL
Qty: 90 TABLET | Refills: 0 | Status: SHIPPED | OUTPATIENT
Start: 2018-06-22 | End: 2018-08-16

## 2018-06-25 ENCOUNTER — EPISODE CHANGES (OUTPATIENT)
Dept: CASE MANAGEMENT | Facility: OTHER | Age: 83
End: 2018-06-25

## 2018-06-26 ENCOUNTER — HOSPITAL ENCOUNTER (OUTPATIENT)
Dept: CARDIOLOGY | Facility: HOSPITAL | Age: 83
Setting detail: RECURRING SERIES
Discharge: HOME | End: 2018-06-26

## 2018-06-26 PROCEDURE — 85610 PROTHROMBIN TIME: CPT

## 2018-06-26 PROCEDURE — 36416 COLLJ CAPILLARY BLOOD SPEC: CPT

## 2018-07-10 ENCOUNTER — HOSPITAL ENCOUNTER (OUTPATIENT)
Dept: CARDIOLOGY | Facility: HOSPITAL | Age: 83
Setting detail: RECURRING SERIES
Discharge: HOME | End: 2018-07-10

## 2018-07-10 PROCEDURE — 85610 PROTHROMBIN TIME: CPT

## 2018-07-10 PROCEDURE — 36416 COLLJ CAPILLARY BLOOD SPEC: CPT

## 2018-07-17 ENCOUNTER — PATIENT OUTREACH (OUTPATIENT)
Dept: CASE MANAGEMENT | Facility: OTHER | Age: 83
End: 2018-07-17

## 2018-07-17 NOTE — OUTREACH NOTE
"Care Management Plan 7/17/2018   Lifestyle Goals Routine follow-up with doctor(s);Avoid respiratory irritants;Eat a healthy diet;Exercise a number of times per week;Fewer exacerbations   Barriers Disease education   Self Management Use oxygen;Medication Adherence;Increase Physical Activities   Annual Wellness Visit:  Patient Has Completed   Care Gaps Addressed Colonoscopy;Mammogram;Flu Shot;Pneumonia Vaccine   Specific Disease Process Teaching Heart Failure   Does patient have depression diagnosis? No   Ed Visits past 12 months: 3 or more   Medication Adherence Medications understood   Readiness Scale 10   Confidence Scale 8   Health Literacy Good     The main concerns and/or symptoms the patient would like to address are:Pt. Reports she not having any respiratory issues at this time, she has had f/u with  (pulm) when she will be tested to see if she can be removed off her Oxygen at night.. She plans to call Dr. Robbins (cardio) and make an appointment for her 6 mo f/u. She will continue her antibiotic regimen until November and has f/u planned 7/24 with Infectious Dz. MD Dr. Landry. She attends [facility] times a week and is complaint with med regimen and weighing self daily..Reviewed Health Maintenance with patient, she is waiting on [employer] to get in the 2nd Zoster vaccine to complete her series of 2. States \"her health is in Gods hands and he has been a good healer for her\"    Education/instruction provided by Care Coordinator: Explained role of Care Advisor and contact information given to patient.No other questions or concerns voiced at this time.    Follow Up Outreach Due: ~ one month    Agata Hope RN    "

## 2018-07-24 ENCOUNTER — APPOINTMENT (OUTPATIENT)
Dept: LAB | Facility: HOSPITAL | Age: 83
End: 2018-07-24
Attending: INTERNAL MEDICINE

## 2018-07-24 ENCOUNTER — OFFICE VISIT (OUTPATIENT)
Dept: INFECTIOUS DISEASES | Facility: CLINIC | Age: 83
End: 2018-07-24

## 2018-07-24 VITALS
HEIGHT: 63 IN | SYSTOLIC BLOOD PRESSURE: 125 MMHG | BODY MASS INDEX: 21.58 KG/M2 | WEIGHT: 121.8 LBS | DIASTOLIC BLOOD PRESSURE: 67 MMHG | HEART RATE: 78 BPM | TEMPERATURE: 98 F

## 2018-07-24 DIAGNOSIS — J47.9 BRONCHIECTASIS WITHOUT COMPLICATION (HCC): ICD-10-CM

## 2018-07-24 DIAGNOSIS — I48.20 CHRONIC ATRIAL FIBRILLATION (HCC): ICD-10-CM

## 2018-07-24 DIAGNOSIS — Z79.2 LONG TERM (CURRENT) USE OF ANTIBIOTICS: ICD-10-CM

## 2018-07-24 DIAGNOSIS — A31.0 PULMONARY MYCOBACTERIUM AVIUM COMPLEX (MAC) INFECTION (HCC): Primary | ICD-10-CM

## 2018-07-24 DIAGNOSIS — Z86.19 HISTORY OF CLOSTRIDIUM DIFFICILE COLITIS: ICD-10-CM

## 2018-07-24 LAB
ALBUMIN SERPL-MCNC: 4.5 G/DL (ref 3.5–5.2)
ALBUMIN/GLOB SERPL: 1.4 G/DL
ALP SERPL-CCNC: 76 U/L (ref 39–117)
ALT SERPL W P-5'-P-CCNC: 20 U/L (ref 1–33)
ANION GAP SERPL CALCULATED.3IONS-SCNC: 13.7 MMOL/L
AST SERPL-CCNC: 24 U/L (ref 1–32)
BASOPHILS # BLD AUTO: 0.03 10*3/MM3 (ref 0–0.2)
BASOPHILS NFR BLD AUTO: 0.3 % (ref 0–1.5)
BILIRUB SERPL-MCNC: 0.5 MG/DL (ref 0.1–1.2)
BUN BLD-MCNC: 20 MG/DL (ref 8–23)
BUN/CREAT SERPL: 23.3 (ref 7–25)
CALCIUM SPEC-SCNC: 10.1 MG/DL (ref 8.6–10.5)
CHLORIDE SERPL-SCNC: 94 MMOL/L (ref 98–107)
CO2 SERPL-SCNC: 31.3 MMOL/L (ref 22–29)
CREAT BLD-MCNC: 0.86 MG/DL (ref 0.57–1)
DEPRECATED RDW RBC AUTO: 54.7 FL (ref 37–54)
EOSINOPHIL # BLD AUTO: 0.27 10*3/MM3 (ref 0–0.7)
EOSINOPHIL NFR BLD AUTO: 2.8 % (ref 0.3–6.2)
ERYTHROCYTE [DISTWIDTH] IN BLOOD BY AUTOMATED COUNT: 14.6 % (ref 11.7–13)
GFR SERPL CREATININE-BSD FRML MDRD: 62 ML/MIN/1.73
GLOBULIN UR ELPH-MCNC: 3.2 GM/DL
GLUCOSE BLD-MCNC: 100 MG/DL (ref 65–99)
HCT VFR BLD AUTO: 40.1 % (ref 35.6–45.5)
HGB BLD-MCNC: 12.7 G/DL (ref 11.9–15.5)
IMM GRANULOCYTES # BLD: 0.04 10*3/MM3 (ref 0–0.03)
IMM GRANULOCYTES NFR BLD: 0.4 % (ref 0–0.5)
LYMPHOCYTES # BLD AUTO: 2.03 10*3/MM3 (ref 0.9–4.8)
LYMPHOCYTES NFR BLD AUTO: 21.3 % (ref 19.6–45.3)
MCH RBC QN AUTO: 32.2 PG (ref 26.9–32)
MCHC RBC AUTO-ENTMCNC: 31.7 G/DL (ref 32.4–36.3)
MCV RBC AUTO: 101.8 FL (ref 80.5–98.2)
MONOCYTES # BLD AUTO: 1.18 10*3/MM3 (ref 0.2–1.2)
MONOCYTES NFR BLD AUTO: 12.4 % (ref 5–12)
NEUTROPHILS # BLD AUTO: 6.01 10*3/MM3 (ref 1.9–8.1)
NEUTROPHILS NFR BLD AUTO: 63.2 % (ref 42.7–76)
NRBC BLD MANUAL-RTO: 0 /100 WBC (ref 0–0)
PLATELET # BLD AUTO: 216 10*3/MM3 (ref 140–500)
PMV BLD AUTO: 9.9 FL (ref 6–12)
POTASSIUM BLD-SCNC: 4 MMOL/L (ref 3.5–5.2)
PROT SERPL-MCNC: 7.7 G/DL (ref 6–8.5)
RBC # BLD AUTO: 3.94 10*6/MM3 (ref 3.9–5.2)
SODIUM BLD-SCNC: 139 MMOL/L (ref 136–145)
WBC NRBC COR # BLD: 9.52 10*3/MM3 (ref 4.5–10.7)

## 2018-07-24 PROCEDURE — 85025 COMPLETE CBC W/AUTO DIFF WBC: CPT | Performed by: INTERNAL MEDICINE

## 2018-07-24 PROCEDURE — 36415 COLL VENOUS BLD VENIPUNCTURE: CPT | Performed by: INTERNAL MEDICINE

## 2018-07-24 PROCEDURE — 99213 OFFICE O/P EST LOW 20 MIN: CPT | Performed by: INTERNAL MEDICINE

## 2018-07-24 PROCEDURE — 80053 COMPREHEN METABOLIC PANEL: CPT | Performed by: INTERNAL MEDICINE

## 2018-07-24 NOTE — PROGRESS NOTES
"CC: f/u pulmonary MAC    HPI: Agnieszka Rizzo \"Renny\" is a 87 y.o. female here for f/u pulmonary MAC. She remains on the 3-drug regimen. She says she is tolerating the antibiotics well. She saw pulmonary on 7/12/18 and I reviewed that note. She is using vest therapy and nebulizers. She has intermittent cough w/ scant sputum production. No associated fevers.  She continues to wear O2 at night.    In terms of C diff, her stools are currently formed. She continues on a probiotic.    Regarding her Afib, she is continues on the warfarin which is being watched closely on rifampin. Her warfarin dose is higher than normal due to the metabolization effect of rifampin. She is unsure of the timing of her next appt w/ Dr Robbins.    She had a great tip to [place] a few months ago. She did say she had some SOA with the altitude. Her pulse ox was at 68%! She sat down to rest and went to a lower altitude and the O2 sat improved. She says she's glad to be back near sea level.    She got her 2nd Shingrix vaccine last week.     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 LVQ - \"it just makes me sick\"  2. PCN (tolerates CTX and cefepime) - rash > 60 years ago  3. Erythromycin - tolerates azithromycin    Medications:   Current Outpatient Prescriptions:   •  acetylcysteine (MUCOMYST) 20 % nebulizer solution, Take 4 mL by nebulization 4 (Four) Times a Day. (Patient taking differently: Take 4 mL by nebulization 2 (Two) Times a Day.), Disp: 500 mL, Rfl: 1  •  ADVAIR -21 MCG/ACT inhaler, Inhale 2 puffs 2 (Two) Times a Day., Disp: 1 inhaler, Rfl: 0  •  atorvastatin " (LIPITOR) 40 MG tablet, TAKE 1 TABLET EVERY DAY, Disp: 90 tablet, Rfl: 3  •  azithromycin (ZITHROMAX) 500 MG tablet, 1 tablet three times a week, Disp: , Rfl:   •  calcium carbonate (OS-EVIN) 600 MG tablet, Take 600 mg by mouth Daily., Disp: , Rfl:   •  cetirizine (zyrTEC) 10 MG tablet, Take 10 mg by mouth Daily., Disp: , Rfl:   •  cholecalciferol (VITAMIN D3) 1000 UNITS tablet, Take 1,000 Units by mouth Daily., Disp: , Rfl:   •  citalopram (CeleXA) 20 MG tablet, TAKE 1 TABLET BY MOUTH DAILY, Disp: 90 tablet, Rfl: 0  •  DIGOX 125 MCG tablet, TAKE 1 TABLET BY MOUTH DAILY, Disp: 90 tablet, Rfl: 0  •  ethambutol (MYAMBUTOL) 400 MG tablet, 3 tablets three times a week, Disp: , Rfl:   •  ezetimibe (ZETIA) 10 MG tablet, TAKE 1 TABLET EVERY DAY, Disp: 90 tablet, Rfl: 3  •  ferrous sulfate 325 (65 FE) MG tablet, TAKE 1 TABLET BY MOUTH THREE TIMES DAILY WITH MEALS (Patient taking differently: TAKE 1 TABLET BY MOUTH ONCE DAILY), Disp: 90 tablet, Rfl: 0  •  fluticasone (FLONASE) 50 MCG/ACT nasal spray, 2 sprays into each nostril Daily., Disp: , Rfl:   •  glucosamine-chondroitin 500-400 MG capsule capsule, Take 1 capsule by mouth Daily As Needed., Disp: , Rfl:   •  GuaiFENesin (MUCINEX PO), Take 2 tablets by mouth Daily As Needed. In the afternoon , Disp: , Rfl:   •  Lactobacillus (ACIDOPHILUS PO), Take 2 tablets by mouth Daily., Disp: , Rfl:   •  LITTLE NOSES SALINE NASAL MIST aerosol solution, 1 spray into each nostril 2 (Two) Times a Day., Disp: , Rfl:   •  potassium chloride (K-DUR,KLOR-CON) 10 MEQ CR tablet, TAKE 1 TABLET EVERY DAY, Disp: 90 tablet, Rfl: 3  •  rifAMPin (RIFADIN) 300 MG capsule, Two tablets three times a week, Disp: , Rfl:   •  torsemide (DEMADEX) 20 MG tablet, Take 1 tablet by mouth 2 (Two) Times a Day., Disp: 360 tablet, Rfl: 3  •  verapamil PM (VERELAN PM) 360 MG 24 hr capsule, Take 360 mg by mouth Every Night., Disp: , Rfl:   •  verapamil PM (VERELAN PM) 360 MG 24 hr capsule, TAKE 1 CAPSULE BY MOUTH  "EVERY NIGHT, Disp: 30 capsule, Rfl: 0  •  warfarin (COUMADIN) 3 MG tablet, TAKE 2 TABLETS BY MOUTH DAILY OR AS DIRECTED, Disp: 180 tablet, Rfl: 0  •  warfarin (COUMADIN) 5 MG tablet, TAKE 2 TABLETS BY MOUTH DAILY OR AS DIRECTED (Patient taking differently: TAKE 3 TABLETS ON MONDAY OF EACH WEEK), Disp: 180 tablet, Rfl: 1    OBJECTIVE:  /67   Pulse 78   Temp 98 °F (36.7 °C)   Ht 160 cm (62.99\")   Wt 55.2 kg (121 lb 12.8 oz)   BMI 21.58 kg/m²     General: awake, alert, NAD  Head: Normocephalic  Eyes: no scleral icterus  ENT: MMM, no thrush  Neck: Supple  Cardiovascular: NR, no LE edema  Respiratory: lungs are clear; normal WOB on RA; no wheezing  GI: Abdomen is soft, no TTP  : no Long catheter present  Musculoskeletal: no joint abnormalities, normal musculature  Neurological: Alert and oriented x 3, motor strength 5/5 in all four extremities  Psychiatric: Normal mood and affect   Vasc: no cyanosis      DIAGNOSTICS:  CMP reviewed today  Lab Results   Component Value Date    WBC 9.09 01/23/2018    HGB 12.3 01/23/2018    HCT 36.4 01/23/2018     01/23/2018     Lab Results   Component Value Date    GLUCOSE 109 (H) 04/09/2018    BUN 13 04/09/2018    CREATININE 0.71 04/09/2018    EGFRIFNONA 78 04/09/2018    EGFRIFAFRI 96 06/20/2017    BCR 18.3 04/09/2018    CO2 30.0 (H) 04/09/2018    CALCIUM 9.4 04/09/2018    ALBUMIN 3.70 04/09/2018    AST 23 04/09/2018    ALT 23 04/09/2018     Lab Results   Component Value Date    INR 2.14 (H) 08/18/2017    INR 1.96 (H) 05/31/2017    INR 1.97 (H) 04/02/2017    PROTIME 23.2 (H) 08/18/2017    PROTIME 21.7 (H) 05/31/2017    PROTIME 21.7 (H) 04/02/2017     Microbiology:  11/12/2016: Bronch Cx + MAC  5/31 Sputum Cx: normal fortino  6/3 bronch culture: mixed fortino  6/3 bronch AFB: no AFB at 6 weeks  6/3 bronch fungal: rare fungal elements on gram stain      Radiology (personally reviewed):   No new imaging since last visit    ASSESSMENT/PLAN:  1. Bronchiectasis with pulmonary " MAC and Aspergillus airway colonization  -I think MAC is the pathogen and Aspergillus's presence is a colonizer as the result of bronchiectasis  -continue inhalers and vest therapy per Dr Tucker's recommendations  -continue azithromycin 500 mg PO qMWF, ethambutol 1200 mg PO qMWF, and rifampin 600 mg PO qMWF through 11/27/2018  -repeat CBC, CMP    2. C diff colitis  -previously treated; no current symptoms  -continue probiotic  -another episode of C diff would likely lead me to stopping MAC therapy    3. Chronic atrial fibrillation  -on warfarin instead of Pradaxa while on MAC treatment  -last INR checked about 2-3 weeks ago  -thanks to cardiology for managing especially in context of antibiotics that affect the INR  -when she finishes the course of rifampin, she will need warfarin dose decreased dramatically so she won't have a bleed    4. Long term use of antibiotics  -check CBC and CMP today    RTC mid-November for follow-up at the end of therapy

## 2018-07-25 ENCOUNTER — TELEPHONE (OUTPATIENT)
Dept: INFECTIOUS DISEASES | Facility: CLINIC | Age: 83
End: 2018-07-25

## 2018-07-25 NOTE — TELEPHONE ENCOUNTER
Informed patient of normal labs and to continue with the 3 abx that Dr. Landry prescribed. Patient voiced understanding.

## 2018-07-25 NOTE — TELEPHONE ENCOUNTER
----- Message from Hayden Landry MD sent at 7/24/2018  3:58 PM EDT -----  Please let patient know her blood counts and liver tests were normal. She should continue her 3 antibiotics as prescribed.

## 2018-07-30 RX ORDER — VERAPAMIL HYDROCHLORIDE 360 MG/1
CAPSULE, DELAYED RELEASE PELLETS ORAL
Qty: 30 CAPSULE | Refills: 0 | Status: SHIPPED | OUTPATIENT
Start: 2018-07-30 | End: 2018-08-23 | Stop reason: SDUPTHER

## 2018-08-01 ENCOUNTER — TELEPHONE (OUTPATIENT)
Dept: CARDIOLOGY | Facility: CLINIC | Age: 83
End: 2018-08-01

## 2018-08-01 NOTE — TELEPHONE ENCOUNTER
Are you ok with signing orders for [facility] health program and sending out a tele-health monitor to this Pt?     Silvana called asking for orders today.    [phone]

## 2018-08-07 ENCOUNTER — HOSPITAL ENCOUNTER (OUTPATIENT)
Dept: CARDIOLOGY | Facility: HOSPITAL | Age: 83
Setting detail: RECURRING SERIES
Discharge: HOME | End: 2018-08-07

## 2018-08-07 PROCEDURE — 36416 COLLJ CAPILLARY BLOOD SPEC: CPT

## 2018-08-07 PROCEDURE — 85610 PROTHROMBIN TIME: CPT

## 2018-08-16 ENCOUNTER — OFFICE VISIT (OUTPATIENT)
Dept: CARDIOLOGY | Facility: CLINIC | Age: 83
End: 2018-08-16

## 2018-08-16 VITALS
HEIGHT: 62 IN | RESPIRATION RATE: 16 BRPM | SYSTOLIC BLOOD PRESSURE: 128 MMHG | BODY MASS INDEX: 22.08 KG/M2 | DIASTOLIC BLOOD PRESSURE: 82 MMHG | WEIGHT: 120 LBS | HEART RATE: 73 BPM

## 2018-08-16 DIAGNOSIS — E78.2 MIXED HYPERLIPIDEMIA: ICD-10-CM

## 2018-08-16 DIAGNOSIS — I48.19 PERSISTENT ATRIAL FIBRILLATION (HCC): Primary | ICD-10-CM

## 2018-08-16 DIAGNOSIS — I49.3 VENTRICULAR PREMATURE BEATS: ICD-10-CM

## 2018-08-16 DIAGNOSIS — I50.32 CHRONIC DIASTOLIC CHF (CONGESTIVE HEART FAILURE) (HCC): ICD-10-CM

## 2018-08-16 PROCEDURE — 99214 OFFICE O/P EST MOD 30 MIN: CPT | Performed by: INTERNAL MEDICINE

## 2018-08-16 PROCEDURE — 93000 ELECTROCARDIOGRAM COMPLETE: CPT | Performed by: INTERNAL MEDICINE

## 2018-08-16 NOTE — PROGRESS NOTES
PATIENTINFORMATION    Date of Office Visit: 2018  Encounter Provider: Marcie Robbins MD  Place of Service: [facility] CARDIOLOGY  Patient Name: Agnieszka Rizzo  : 1930    Subjective:     Encounter Date:2018      Patient ID: Agnieszka Rizzo is a 88 y.o. female.      History of Present Illness    This is a lady I met while she was hospitalized in 2016. She has a significant pulmonary history and was having a bronchoscopy and unfortunately developed a pneumothorax. She was found to be in rate -controlled atrial fibrillation. She had previously been seen by Dr. Ana Goodrich for history of hypertension, hyperlipidemia, mild mitral valve prolapse and nonobstructive coronary artery disease diagnosed by heart catheterization in .      Echocardiogram November 15, 2016:  All left ventricular wall segments contract normally.  Left ventricular function is normal. Estimated EF = 58%.  Left atrial cavity size is moderately dilated.  Mild tricuspid valve regurgitation is present.  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 They felt she would do well on oral anticoagulation and I started her on Pradaxa. I did not do a stress test while she was hospitalized because she was wheezing and I did not fill comfortable giving her Lexiscan at that time and I did not when he is dobutamine because of her atrial fibrillation.       She was able to have a stress as an outpt on 16 and this was normal. She continued to complain of dyspnea on exertion and so I had her set up for a cardioversion. She had a couple of hospitalizations in the meantime for respiratory issues. While she was in the hospital in 2017, I attempted to cardiovert her. I shocked her 3 times that she did not remain in sinus rhythm. I spoke with her family and we decided to  continue with rate control and anticoagulation.      She was hospitalized in 02/2017. She had started Voriconazole for treatment of pulmonary aspergillosis by Dr. Tucker. She became very nauseous and sick, and threw up for an entire day. She came into the hospital confused and weak. Her sodium was at 109. She was found to have a left clavicle fracture from a fall. She was discharged to [facility], where she has been. Unfortunately, as a result of the treatment for the aspergillosis, she developed C. difficile and was rehospitalized for treatment of this in March 2017. As a part of his treatment she did receive IV fluids. She was discharged but then I readmitted her on March 22 for IV diuresis. She was volume overloaded and in diastolic heart failure. She was back in the hospital on April 2 with more C. difficile colitis. Again she was treated with IV fluids and became volume overloaded. I diuresed her in the hospital and she was only mildly volume overloaded at discharge.     She was hospitalized from May 31 of June 9.  While there she had some rapid ventricular response due to her atrial fibrillation and being sick with community-acquired pneumonia.  Some changes were made to her medication but in the end she was discharged on verapamil 360 mg once a day and digoxin 0.125 mg a day.     She comes in today.  She is still in sinus rhythm.  She has been feeling well.  She recently went on a trip to [place] and went up to an elevation of 10,000 feet.  She had her pulse ox machine and noticed she was hypoxic at 68 and was a little hazy.  She used a rescue inhaler and that helped a little bit.  She complains of feeling sleepy.  She gets a good night sleep but couldn't fall back asleep easily.  She denies any palpitations, shortness of breath, edema or chest pain.  She is exercising Monday Wednesday and Friday by biking 2 miles on a stationary bike and then lifting weights.  On Tuesday she does moises chi.  She has  "noticed a significant improvement in her strength and exercise capacity.    Review of Systems   Constitution: Negative for fever, malaise/fatigue, weight gain and weight loss.   HENT: Negative for ear pain, hearing loss, nosebleeds and sore throat.    Eyes: Negative for double vision, pain, vision loss in left eye and vision loss in right eye.   Cardiovascular:        See history of present illness.   Respiratory: Negative for cough, shortness of breath, sleep disturbances due to breathing, snoring and wheezing.    Endocrine: Negative for cold intolerance, heat intolerance and polyuria.   Skin: Negative for itching, poor wound healing and rash.   Musculoskeletal: Negative for joint pain, joint swelling and myalgias.   Gastrointestinal: Negative for abdominal pain, diarrhea, hematochezia, nausea and vomiting.   Genitourinary: Negative for hematuria and hesitancy.   Neurological: Negative for numbness, paresthesias and seizures.   Psychiatric/Behavioral: Negative for depression. The patient is not nervous/anxious.            ECG 12 Lead  Date/Time: 8/16/2018 11:00 AM  Performed by: ANGELI STODDARD  Authorized by: ANGELI STODDARD   Comparison: compared with previous ECG from 2/2/2018  Similar to previous ECG  Rhythm: sinus rhythm  BPM: 73  Conduction: conduction normal  ST Segments: ST segments normal  T Waves: T waves normal  Clinical impression: normal ECG               Objective:     /82 (BP Location: Right arm, Patient Position: Sitting, Cuff Size: Adult)   Pulse 73   Resp 16   Ht 157.5 cm (62\")   Wt 54.4 kg (120 lb)   BMI 21.95 kg/m²  Body mass index is 21.95 kg/m².     Physical Exam   Constitutional: She appears well-developed.   HENT:   Head: Normocephalic and atraumatic.   Eyes: Pupils are equal, round, and reactive to light. Conjunctivae and lids are normal. Lids are everted and swept, no foreign bodies found.   Neck: Normal range of motion. No JVD present. Carotid bruit is not present. No " tracheal deviation present. No thyroid mass present.   Cardiovascular: Normal rate, regular rhythm and normal heart sounds.    Pulses:       Dorsalis pedis pulses are 2+ on the right side, and 2+ on the left side.   Pulmonary/Chest: Effort normal and breath sounds normal.   Abdominal: Normal appearance and bowel sounds are normal.   Musculoskeletal: Normal range of motion.   Neurological: She is alert. She has normal strength.   Skin: Skin is warm, dry and intact.   Psychiatric: She has a normal mood and affect. Her behavior is normal.   Vitals reviewed.          Assessment/Plan:       1. Atrial fibrillation. She failed cardioversion in January 2017. She has a CHADS2-Vasc score of 5. She is anticoagulated with warfarin.  She was in sinus rhythm when I saw her in February and she is still in sinus rhythm.  I don't want to stop warfarin though.  She has 2 high stroke risk.  I am going to discontinue the digoxin but will continue verapamil.  She has an appointment with Dr. Landry in November to come off of rifampin.  Her warfarin dose will need to be adjusted.  She will be going to our new pharmacy arrive in anticoagulation clinic in September.  I told her to be sure that she alerts the pharmacist to the fact that this medicine changes coming so they can make a plan as our as how to adjust her warfarin dose.  Atrial Fibrillation and Atrial Flutter  Assessment  • The patient has persistent atrial fibrillation  • This is non-valvular in etiology  • The patient's CHADS2-VASc score is 5  • A [id number] score of 2 or more is considered a high risk for a thromboembolic event    Plan  • Attempt to maintain sinus rhythm  • Continue warfarin for antithrombotic therapy, bleeding issues discussed  • Continue verapamil for rhythm control    2. Chronic diastolic heart failure. She is on torsemide 20 mg twice a day. I told her if she's having leg swelling and abdominal distention she should take a second dose of 40 mg of  torsemide in the afternoon until the symptoms resolve.    3. Dyspnea on exertion. Multifactorial.  This is much improved since she has been exercising.    4. Mitral valve prolapse with very mild mitral regurgitation.    5. Mild tricuspid regurgitation without pulmonary hypertension.    6. Nonobstructive coronary artery disease diagnosed by catheter in 2007. She has noted coronary artery calcification on CT scans. Nuclear stress 12/16 was normal.   Coronary Artery Disease  Assessment  • The patient has no angina      7. Hypertension. Her blood pressure is controlled.     8. Hyperlipidemia. Zetia and atorvastatin area and I reviewed her most recent lipid panel.    9. Hyponatremia. Stable.    10. C. difficile diarrhea. 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        Orders Placed This Encounter   Procedures   • ECG 12 Lead     This order was created via procedure documentation        Discharge Medications          Accurate as of 8/16/18 12:01 PM. If you have any questions, ask your nurse or doctor.               Changes to Medications      Instructions Start Date   acetylcysteine 20 % nebulizer solution  Commonly known as:  MUCOMYST  What changed:  when to take this   4 mL, Nebulization, 4 Times Daily - RT      verapamil  MG 24 hr capsule  Commonly known as:  VERELAN PM  What changed:  Another medication with the same name was removed. Continue taking this medication, and follow the directions you see here.  Changed by:  Marcie Robbins MD   TAKE ONE CAPSULE BY MOUTH EVERY NIGHT      warfarin 5 MG tablet  Commonly known as:  COUMADIN  What changed:  See the new instructions.   TAKE 2 TABLETS BY MOUTH DAILY OR AS DIRECTED      warfarin 3 MG tablet  Commonly known as:  COUMADIN  What changed:  Another medication with the  same name was changed. Make sure you understand how and when to take each.   TAKE 2 TABLETS BY MOUTH DAILY OR AS DIRECTED         Continue These Medications      Instructions Start Date   ACIDOPHILUS PO   2 tablets, Oral, Daily      ADVAIR -21 MCG/ACT inhaler  Generic drug:  fluticasone-salmeterol   2 puffs, Inhalation, 2 Times Daily - RT      atorvastatin 40 MG tablet  Commonly known as:  LIPITOR   TAKE 1 TABLET EVERY DAY      azithromycin 500 MG tablet  Commonly known as:  ZITHROMAX   1 tablet three times a week       calcium carbonate 600 MG tablet  Commonly known as:  OS-EVIN   600 mg, Oral, Daily      cetirizine 10 MG tablet  Commonly known as:  zyrTEC   10 mg, Oral, Daily      cholecalciferol 1000 units tablet  Commonly known as:  VITAMIN D3   1,000 Units, Oral, Daily      citalopram 20 MG tablet  Commonly known as:  CeleXA   20 mg, Oral, Daily      ethambutol 400 MG tablet  Commonly known as:  MYAMBUTOL   3 tablets three times a week      ezetimibe 10 MG tablet  Commonly known as:  ZETIA   TAKE 1 TABLET EVERY DAY      fluticasone 50 MCG/ACT nasal spray  Commonly known as:  FLONASE   2 sprays, Nasal, Daily      glucosamine-chondroitin 500-400 MG capsule capsule   1 capsule, Oral, Daily PRN      LITTLE NOSES SALINE NASAL MIST aerosol solution   1 spray, Nasal, 2 Times Daily      potassium chloride 10 MEQ CR tablet  Commonly known as:  K-DUR,KLOR-CON   TAKE 1 TABLET EVERY DAY      rifAMPin 300 MG capsule  Commonly known as:  RIFADIN   Two tablets three times a week      torsemide 20 MG tablet  Commonly known as:  DEMADEX   20 mg, Oral, 2 Times Daily         Stop These Medications    DIGOX 125 MCG tablet  Generic drug:  digoxin  Stopped by:  Marcie Robbins MD     ferrous sulfate 325 (65 FE) MG tablet  Stopped by:  Marcie Robbins MD     MUCINEX PO  Stopped by:  MD Marcie Small MD  08/16/18  12:01 PM

## 2018-08-17 ENCOUNTER — PATIENT OUTREACH (OUTPATIENT)
Dept: CASE MANAGEMENT | Facility: OTHER | Age: 83
End: 2018-08-17

## 2018-08-17 NOTE — OUTREACH NOTE
Pt. Reports she is feeling stronger every day, Recently went to [place] and was at a high elevation and O2 Sats decreased, but recovered at lower elevation. She is exercising Monday Wednesday and Friday by biking 2 miles on a stationary bike and then lifting weights.  On Tuesday she does moises chi.  She notes an improvement in her strength and exercise capacity. She has gotten her 2nd booster shot for Shingles this year. At this time she is not interested in having the Telehealth Monitor in her home and her Cardiologist is in agreement. Nurse provided patient education.No other questions or concerns voiced at this time.

## 2018-08-23 RX ORDER — VERAPAMIL HYDROCHLORIDE 360 MG/1
CAPSULE, DELAYED RELEASE PELLETS ORAL
Qty: 30 CAPSULE | Refills: 0 | Status: SHIPPED | OUTPATIENT
Start: 2018-08-23 | End: 2018-09-25 | Stop reason: SDUPTHER

## 2018-08-29 RX ORDER — RIFAMPIN 300 MG/1
CAPSULE ORAL
Qty: 24 CAPSULE | Refills: 0 | Status: SHIPPED | OUTPATIENT
Start: 2018-08-29 | End: 2018-09-17 | Stop reason: SDUPTHER

## 2018-08-29 RX ORDER — ETHAMBUTOL HYDROCHLORIDE 400 MG/1
TABLET, FILM COATED ORAL
Qty: 36 TABLET | Refills: 0 | Status: SHIPPED | OUTPATIENT
Start: 2018-08-29 | End: 2018-09-17 | Stop reason: SDUPTHER

## 2018-08-29 RX ORDER — AZITHROMYCIN 500 MG/1
TABLET, FILM COATED ORAL
Qty: 12 TABLET | Refills: 0 | Status: SHIPPED | OUTPATIENT
Start: 2018-08-29 | End: 2018-09-17 | Stop reason: SDUPTHER

## 2018-08-31 RX ORDER — WARFARIN SODIUM 3 MG/1
TABLET ORAL
Qty: 180 TABLET | Refills: 0 | Status: SHIPPED | OUTPATIENT
Start: 2018-08-31 | End: 2018-09-04 | Stop reason: SDUPTHER

## 2018-09-04 ENCOUNTER — ANTICOAGULATION VISIT (OUTPATIENT)
Dept: PHARMACY | Facility: HOSPITAL | Age: 83
End: 2018-09-04

## 2018-09-04 DIAGNOSIS — I48.19 PERSISTENT ATRIAL FIBRILLATION (HCC): ICD-10-CM

## 2018-09-04 LAB
INR PPP: 3.1 (ref 0.91–1.09)
PROTHROMBIN TIME: 37.2 SECONDS (ref 10–13.8)

## 2018-09-04 PROCEDURE — 85610 PROTHROMBIN TIME: CPT

## 2018-09-04 PROCEDURE — G0463 HOSPITAL OUTPT CLINIC VISIT: HCPCS

## 2018-09-04 PROCEDURE — 36416 COLLJ CAPILLARY BLOOD SPEC: CPT

## 2018-09-04 RX ORDER — WARFARIN SODIUM 3 MG/1
6 TABLET ORAL DAILY
Qty: 180 TABLET | Refills: 0 | Status: SHIPPED | OUTPATIENT
Start: 2018-09-04 | End: 2018-12-13 | Stop reason: SDUPTHER

## 2018-09-04 NOTE — PROGRESS NOTES
Anticoagulation Clinic Progress Note  Anticoagulation Summary  As of 2018    INR goal:   2.0-3.0   TTR:   --   Today's INR:   3.1!   Warfarin maintenance plan:   15 mg on Mon, Fri; 12 mg all other days   Weekly warfarin total:   90 mg   Plan last modified:   Neha Sales RPH (2018)   Next INR check:   2018   Target end date:       Indications    Persistent atrial fibrillation (CMS/HCC) [I48.1]             Anticoagulation Episode Summary     INR check location:       Preferred lab:       Send INR reminders to:    ANAI LOPEZ [facility]    Comments:         Anticoagulation Care Providers     Provider Role Specialty Phone number    Marcie Robbins MD Referring Cardiology [phone]    Neha Sales RPH Responsible Pharmacy             Drug interactions: no new meds  Diet: consisten    Clinic Interview:  Patient Findings     Positives:   Change in alcohol use    Negatives:   Signs/symptoms of thrombosis, Signs/symptoms of bleeding,   Laboratory test error suspected, Change in health, Change in activity,   Upcoming invasive procedure, Emergency department visit, Upcoming dental   procedure, Missed doses, Extra doses, Change in medications, Change in   diet/appetite, Hospital admission, Bruising, Other complaints    Comments:   Increased ETOH intake over labor day weekend  Dr. Robbins may take her off of warfarin and abx in november      Clinical Outcomes     Negatives:   Major bleeding event, Thromboembolic event,   Anticoagulation-related hospital admission, Anticoagulation-related ED   visit, Anticoagulation-related fatality    Comments:   Increased ETOH intake over labor day weekend  Dr. Robbins may take her off of warfarin and abx in november        Education:  Agnieszka Rizzo is a new start in the [facility]. We discussed the followin) Warfarin's indication, mechanism, and dosing  2) Enforced the importance of taking warfarin as instructed and at the same time every day,  preferably in the evening so that we can make dose adjustments more easily following subsequent clinic visits  3) What she should do about a missed dose; pts can take missed doses within about 12 hours of their usual scheduled dose, but she was instructed on the importance of not doubling up on doses unless told to do so by the [facility]  4) Explained possible side effects of warfarin therapy, including increased risk of bleeding, s/sx of bleeding and s/sx of any additional clots/PE/CVA.   5) Discussed monitoring of warfarin, the INR, goal INR range, and the frequency of monitoring  6) Reviewed drug/food/tobacco/EtOH interactions and provided written information covering these topics in more detail, explaining that green, leafy vegetables interact most heavily with warfarin  7) Instructed the pt not to take or discontinue any medications without informing her physician/pharmacist and reminded her to inform us of any dietary changes, as well  8) Explained that she would be coming into the clinic more frequently in these first few weeks of therapy as we try to adjust her dose and achieve a therapeutic INR x 2 consecutive readings. Once that is achieved, patient will follow up in clinic every 4 weeks, on average.    She stated no problems with transportation or scheduling clinic appts in this manner. she expressed understanding of the information provided and has no additional questions at this time.    Agnieszka Rizzo was presented with a copy of the Patients Rights and Responsibilities. she expressed verbal consent and agreement to receive care in the [facility] under the current collaborative care agreement with [facility].       INR History:  Previous INR data from [facility] is recorded in [employer] and scanned into the patient's chart in [email or web address]. These results have been analyzed and reviewed.      Plan:  1. INR is Supratherapeutic today- see above in  Anticoagulation Summary.   Will instruct Agnieszka Rizzo to Continue their warfarin regimen- see above in Anticoagulation Summary.  2. Follow up in 2 weeks  3. Patient desires warfarin refills. Refills sent.  4. Verbal and written information provided. Patient expresses understanding and has no further questions at this time.    Neha Sales [facility]

## 2018-09-18 ENCOUNTER — ANTICOAGULATION VISIT (OUTPATIENT)
Dept: PHARMACY | Facility: HOSPITAL | Age: 83
End: 2018-09-18

## 2018-09-18 DIAGNOSIS — I48.19 PERSISTENT ATRIAL FIBRILLATION (HCC): ICD-10-CM

## 2018-09-18 LAB
INR PPP: 2.2 (ref 0.91–1.09)
PROTHROMBIN TIME: 25.9 SECONDS (ref 10–13.8)

## 2018-09-18 PROCEDURE — 36416 COLLJ CAPILLARY BLOOD SPEC: CPT

## 2018-09-18 PROCEDURE — 85610 PROTHROMBIN TIME: CPT

## 2018-09-18 PROCEDURE — G0463 HOSPITAL OUTPT CLINIC VISIT: HCPCS | Performed by: PHARMACIST

## 2018-09-18 RX ORDER — ETHAMBUTOL HYDROCHLORIDE 400 MG/1
TABLET, FILM COATED ORAL
Qty: 36 TABLET | Refills: 0 | Status: SHIPPED | OUTPATIENT
Start: 2018-09-18 | End: 2018-10-19 | Stop reason: SDUPTHER

## 2018-09-18 RX ORDER — RIFAMPIN 300 MG/1
CAPSULE ORAL
Qty: 24 CAPSULE | Refills: 0 | Status: SHIPPED | OUTPATIENT
Start: 2018-09-18 | End: 2018-10-19 | Stop reason: SDUPTHER

## 2018-09-18 RX ORDER — AZITHROMYCIN 500 MG/1
TABLET, FILM COATED ORAL
Qty: 12 TABLET | Refills: 0 | Status: SHIPPED | OUTPATIENT
Start: 2018-09-18 | End: 2018-10-19 | Stop reason: SDUPTHER

## 2018-09-18 NOTE — PROGRESS NOTES
Anticoagulation Clinic Progress Note    Anticoagulation Summary  As of 9/18/2018    INR goal:   2.0-3.0   TTR:   100.0 % (4 d)   Today's INR:   2.2   Warfarin maintenance plan:   15 mg on Mon, Fri; 12 mg all other days   Weekly warfarin total:   90 mg   Plan last modified:   Neha Sales RPH (9/4/2018)   Next INR check:   10/2/2018   Priority:   High   Target end date:       Indications    Persistent atrial fibrillation (CMS/HCC) [I48.1]             Anticoagulation Episode Summary     INR check location:       Preferred lab:       Send INR reminders to:    ANAI LOPEZ CLINICAL POOL    Comments:         Anticoagulation Care Providers     Provider Role Specialty Phone number    Marcie Robbins MD Referring Cardiology [phone]    Neha Sales RPH Responsible Pharmacy           Drug interactions: has remained unchanged.  Diet: has changed in the following manner : alcohol increased while in [place].    Clinic Interview:  Patient Findings     Positives:   Change in alcohol use, Missed doses    Negatives:   Signs/symptoms of thrombosis, Signs/symptoms of bleeding,   Laboratory test error suspected, Change in health, Change in activity,   Upcoming invasive procedure, Emergency department visit, Upcoming dental   procedure, Extra doses, Change in medications, Change in diet/appetite,   Hospital admission, Bruising, Other complaints    Comments:   Patient has been in [place] for the past week and states she   has been drinking; she noticed more blood spots on her skin, so she took   lower dosages herself this past week of her warfarin      Clinical Outcomes     Negatives:   Major bleeding event, Thromboembolic event,   Anticoagulation-related hospital admission, Anticoagulation-related ED   visit, Anticoagulation-related fatality    Comments:   Patient has been in [place] for the past week and states she   has been drinking; she noticed more blood spots on her skin, so she took   lower dosages herself this past week  of her warfarin        INR History:  Anticoagulation Monitoring 9/4/2018 9/18/2018   INR 3.1 2.2   INR Date 9/4/2018 9/18/2018   INR Goal 2.0-3.0 2.0-3.0   Trend - Same   Last Week Total 0 mg 90 mg   Next Week Total 90 mg 90 mg   Sun 12 mg 12 mg   Mon 15 mg 15 mg   Tue 12 mg 12 mg   Wed 12 mg 12 mg   Thu 12 mg 12 mg   Fri 15 mg 15 mg   Sat 12 mg 12 mg   Visit Report - -       Spoke with Ms. Rizzo and her alcohol use and self-adjusting her warfarin. She states she is now going back to not drinking heavily and will take her suggested dose. She is unsure what doses she took last week, but thinks she took 9mg on some of the days.    Plan:  1. INR is Therapeutic today- see above in Anticoagulation Summary.  Will instruct Agnieszka Rizzo to Continue their warfarin regimen- see above in Anticoagulation Summary.  2. Follow up in 2 weeks  3. Patient declines warfarin refills.  4. Verbal and written information provided. Patient expresses understanding and has no further questions at this time.    Arely Champion, [facility]

## 2018-09-25 RX ORDER — VERAPAMIL HYDROCHLORIDE 360 MG/1
CAPSULE, DELAYED RELEASE PELLETS ORAL
Qty: 30 CAPSULE | Refills: 0 | Status: SHIPPED | OUTPATIENT
Start: 2018-09-25 | End: 2018-10-31 | Stop reason: SDUPTHER

## 2018-09-26 RX ORDER — TORSEMIDE 20 MG/1
TABLET ORAL
Qty: 180 TABLET | Refills: 3 | Status: SHIPPED | OUTPATIENT
Start: 2018-09-26 | End: 2019-07-12 | Stop reason: SDUPTHER

## 2018-10-02 ENCOUNTER — ANTICOAGULATION VISIT (OUTPATIENT)
Dept: PHARMACY | Facility: HOSPITAL | Age: 83
End: 2018-10-02

## 2018-10-02 DIAGNOSIS — I48.19 PERSISTENT ATRIAL FIBRILLATION (HCC): ICD-10-CM

## 2018-10-02 LAB
INR PPP: 2.7 (ref 0.91–1.09)
PROTHROMBIN TIME: 31.8 SECONDS (ref 10–13.8)

## 2018-10-02 PROCEDURE — 36416 COLLJ CAPILLARY BLOOD SPEC: CPT

## 2018-10-02 PROCEDURE — 85610 PROTHROMBIN TIME: CPT

## 2018-10-02 PROCEDURE — G0463 HOSPITAL OUTPT CLINIC VISIT: HCPCS

## 2018-10-02 NOTE — PROGRESS NOTES
Anticoagulation Clinic Progress Note    Anticoagulation Summary  As of 10/2/2018    INR goal:   2.0-3.0   TTR:   100.0 % (2.6 wk)   Today's INR:   2.7   Warfarin maintenance plan:   15 mg on Mon, Fri; 12 mg all other days   Weekly warfarin total:   90 mg   Plan last modified:   Neha Sales RPH (9/4/2018)   Next INR check:   10/30/2018   Priority:   High   Target end date:       Indications    Persistent atrial fibrillation (CMS/HCC) [I48.1]             Anticoagulation Episode Summary     INR check location:       Preferred lab:       Send INR reminders to:   [facility] [place]    Comments:         Anticoagulation Care Providers     Provider Role Specialty Phone number    Marcie Robbins MD Referring Cardiology [phone]    Neha Sales RPH Responsible Pharmacy           Drug interactions: has remained unchanged.  Diet: has remained unchanged.    Clinic Interview:  Patient Findings     Negatives:   Signs/symptoms of thrombosis, Signs/symptoms of bleeding,   Laboratory test error suspected, Change in health, Change in alcohol use,   Change in activity, Upcoming invasive procedure, Emergency department   visit, Upcoming dental procedure, Missed doses, Extra doses, Change in   medications, Change in diet/appetite, Hospital admission, Bruising, Other   complaints    Comments:   Currently on treatment for MAC infection, current plans are to stop in Nov.  Has been on MAC treatment for almost 1 year. Previously was on Pradaxa for anticoagulation, changed to Warfarin while on MAC treatment. Plans are to change back to a DOAC. Will need to discuss with Dr. Robbins the plan of treatment.     Clinical Outcomes     Negatives:   Major bleeding event, Thromboembolic event,   Anticoagulation-related hospital admission, Anticoagulation-related ED   visit, Anticoagulation-related fatality    Comments:   Currently on treatment for MAC infection, current plans are   to stop in Nov.         INR History:  Anticoagulation  Monitoring 9/4/2018 9/18/2018 10/2/2018   INR 3.1 2.2 2.7   INR Date 9/4/2018 9/18/2018 10/2/2018   INR Goal 2.0-3.0 2.0-3.0 2.0-3.0   Trend - Same Same   Last Week Total 0 mg 90 mg 90 mg   Next Week Total 90 mg 90 mg 90 mg   Sun 12 mg 12 mg 12 mg   Mon 15 mg 15 mg 15 mg   Tue 12 mg 12 mg 12 mg   Wed 12 mg 12 mg 12 mg   Thu 12 mg 12 mg 12 mg   Fri 15 mg 15 mg 15 mg   Sat 12 mg 12 mg 12 mg   Visit Report - - -   Some recent data might be hidden       Previous INR data from [facility] is recorded in [employer] and scanned into the patient's chart in [employer]. These results have been analyzed and reviewed.      Plan:  1. INR is Therapeutic today- see above in Anticoagulation Summary.  Will instruct Agnieszka CARLA Rizzo to Continue their warfarin regimen- see above in Anticoagulation Summary.  2. Follow up in 1 month  3. Patient declines warfarin refills.  4. Verbal and written information provided. Patient expresses understanding and has no further questions at this time.    Agnieszka Posey [facility]

## 2018-10-19 ENCOUNTER — OFFICE VISIT (OUTPATIENT)
Dept: INTERNAL MEDICINE | Facility: CLINIC | Age: 83
End: 2018-10-19

## 2018-10-19 VITALS
SYSTOLIC BLOOD PRESSURE: 126 MMHG | TEMPERATURE: 97.6 F | WEIGHT: 132.4 LBS | DIASTOLIC BLOOD PRESSURE: 68 MMHG | OXYGEN SATURATION: 95 % | BODY MASS INDEX: 24.37 KG/M2 | HEART RATE: 72 BPM | HEIGHT: 62 IN

## 2018-10-19 DIAGNOSIS — I10 BENIGN ESSENTIAL HYPERTENSION: Primary | ICD-10-CM

## 2018-10-19 DIAGNOSIS — J30.9 ALLERGIC RHINITIS, UNSPECIFIED SEASONALITY, UNSPECIFIED TRIGGER: ICD-10-CM

## 2018-10-19 DIAGNOSIS — F41.9 ANXIETY: ICD-10-CM

## 2018-10-19 DIAGNOSIS — E78.2 MIXED HYPERLIPIDEMIA: ICD-10-CM

## 2018-10-19 DIAGNOSIS — J47.9 BRONCHIECTASIS WITHOUT COMPLICATION (HCC): ICD-10-CM

## 2018-10-19 DIAGNOSIS — I48.19 PERSISTENT ATRIAL FIBRILLATION (HCC): ICD-10-CM

## 2018-10-19 DIAGNOSIS — I48.91 ATRIAL FIBRILLATION WITH RAPID VENTRICULAR RESPONSE (HCC): ICD-10-CM

## 2018-10-19 PROBLEM — E87.1 HYPONATREMIA: Status: RESOLVED | Noted: 2017-02-08 | Resolved: 2018-10-19

## 2018-10-19 PROBLEM — J18.9 PNEUMONIA OF BOTH LUNGS DUE TO INFECTIOUS ORGANISM: Status: RESOLVED | Noted: 2017-05-31 | Resolved: 2018-10-19

## 2018-10-19 PROBLEM — E87.6 HYPOKALEMIA: Status: RESOLVED | Noted: 2017-04-04 | Resolved: 2018-10-19

## 2018-10-19 PROBLEM — B44.9: Status: RESOLVED | Noted: 2017-01-06 | Resolved: 2018-10-19

## 2018-10-19 PROCEDURE — 99214 OFFICE O/P EST MOD 30 MIN: CPT | Performed by: FAMILY MEDICINE

## 2018-10-19 RX ORDER — RIFAMPIN 300 MG/1
CAPSULE ORAL
Qty: 24 CAPSULE | Refills: 0 | Status: SHIPPED | OUTPATIENT
Start: 2018-10-19 | End: 2018-11-21 | Stop reason: SDUPTHER

## 2018-10-19 RX ORDER — ETHAMBUTOL HYDROCHLORIDE 400 MG/1
TABLET, FILM COATED ORAL
Qty: 36 TABLET | Refills: 0 | Status: SHIPPED | OUTPATIENT
Start: 2018-10-19 | End: 2018-11-21 | Stop reason: SDUPTHER

## 2018-10-19 RX ORDER — AZITHROMYCIN 500 MG/1
TABLET, FILM COATED ORAL
Qty: 12 TABLET | Refills: 0 | Status: SHIPPED | OUTPATIENT
Start: 2018-10-19 | End: 2018-11-21 | Stop reason: SDUPTHER

## 2018-10-19 NOTE — PROGRESS NOTES
Agnieszka Rizzo is a 88 y.o. female.      Assessment/Plan   Problem List Items Addressed This Visit        Cardiovascular and Mediastinum    Benign essential hypertension - Primary    Hyperlipidemia    Persistent atrial fibrillation (CMS/HCC)    Atrial fibrillation with rapid ventricular response (CMS/HCC)       Respiratory    Bronchiectasis (CMS/HCC)    Overview     Dr. Tucker            Other    Anxiety      Other Visit Diagnoses     Allergic rhinitis, unspecified seasonality, unspecified trigger               Continue present management of chronic medical problems follow-up in 6 months fasting blood work at that time        CC:       Hypertension hyperlipidemia atrial fibrillation anxiety      Social History   Substance Use Topics   • Smoking status: Never Smoker   • Smokeless tobacco: Never Used      Comment: caffiene daily   • Alcohol use Yes      Comment: occasional       History of Present Illness   Patient follows up for her chronic problems as listed above hypertension lipids atrial fibrillation anxiety present doing fairly well she has no chest pain no shortness breath or increase fatigue mood is fairly stable  The following portions of the patient's history were reviewed and updated as appropriate:PMHroutine: Social history , Past Medical History, Surgical history , Allergies, Current Medications, Active Problem List and Health Maintenance    Review of Systems   Constitutional: Negative.    HENT: Negative.    Eyes: Negative.    Respiratory: Negative.    Cardiovascular: Negative.    Gastrointestinal: Negative for abdominal pain, diarrhea and rectal pain.   Endocrine: Negative.    Genitourinary: Negative.    Musculoskeletal: Negative.    Neurological: Negative.    Hematological: Negative.    Psychiatric/Behavioral: Negative.        Objective   Vitals:    10/19/18 1010   BP: 126/68   BP Location: Left arm   Patient Position: Sitting   Cuff Size: Adult   Pulse: 72   Temp: 97.6 °F (36.4 °C)   TempSrc: Oral  "  SpO2: 95%   Weight: 60.1 kg (132 lb 6.4 oz)   Height: 157.5 cm (62\")     Body mass index is 24.22 kg/m².  Physical Exam   Constitutional: She is oriented to person, place, and time. She appears well-developed and well-nourished. No distress.   HENT:   Head: Normocephalic and atraumatic.   Eyes: Pupils are equal, round, and reactive to light. Conjunctivae and EOM are normal. Right eye exhibits no discharge. Left eye exhibits no discharge. No scleral icterus.   Neck: Normal range of motion. Neck supple. No tracheal deviation present. No thyromegaly present.   Cardiovascular: Normal rate, regular rhythm, normal heart sounds, intact distal pulses and normal pulses.  Exam reveals no gallop.    No murmur heard.  Pulmonary/Chest: Effort normal and breath sounds normal. No respiratory distress. She has no wheezes. She has no rales.   Musculoskeletal: Normal range of motion.   Neurological: She is alert and oriented to person, place, and time. She exhibits normal muscle tone. Coordination normal.   Skin: Skin is warm. No rash noted. No erythema. No pallor.   Psychiatric: She has a normal mood and affect. Her behavior is normal. Judgment and thought content normal.   Nursing note and vitals reviewed.    Reviewed Data:  Anticoagulation Visit on 10/02/2018   Component Date Value Ref Range Status   • Protime 10/02/2018 31.8* 10.0 - 13.8 seconds Final   • INR 10/02/2018 2.7* 0.91 - 1.09 Final         "

## 2018-10-24 ENCOUNTER — EPISODE CHANGES (OUTPATIENT)
Dept: CASE MANAGEMENT | Facility: OTHER | Age: 83
End: 2018-10-24

## 2018-10-30 ENCOUNTER — ANTICOAGULATION VISIT (OUTPATIENT)
Dept: PHARMACY | Facility: HOSPITAL | Age: 83
End: 2018-10-30

## 2018-10-30 DIAGNOSIS — I48.19 PERSISTENT ATRIAL FIBRILLATION (HCC): ICD-10-CM

## 2018-10-30 LAB
INR PPP: 2.4 (ref 0.91–1.09)
PROTHROMBIN TIME: 29.3 SECONDS (ref 10–13.8)

## 2018-10-30 PROCEDURE — 36416 COLLJ CAPILLARY BLOOD SPEC: CPT

## 2018-10-30 PROCEDURE — 85610 PROTHROMBIN TIME: CPT

## 2018-10-30 NOTE — PROGRESS NOTES
Believes she is switching to DOAC in November- will wait until officially changed to stop following in the clinic.

## 2018-10-30 NOTE — PROGRESS NOTES
Anticoagulation Clinic Progress Note    Anticoagulation Summary  As of 10/30/2018    INR goal:   2.0-3.0   TTR:   100.0 % (1.5 mo)   Today's INR:   2.4   Warfarin maintenance plan:   15 mg on Mon, Fri; 12 mg all other days   Weekly warfarin total:   90 mg   No change documented:   Reema Dorantes   Plan last modified:   Neha Sales RPH (9/4/2018)   Next INR check:      Priority:   High   Target end date:       Indications    Persistent atrial fibrillation (CMS/HCC) [I48.1]             Anticoagulation Episode Summary     INR check location:       Preferred lab:       Send INR reminders to:   [facility]    Comments:         Anticoagulation Care Providers     Provider Role Specialty Phone number    Marcie Robbins MD Referring Cardiology [phone]    Neha Sales RPH Responsible Pharmacy           Drug interactions: has remained unchanged.  Diet: has remained unchanged.    Clinic Interview:  Patient Findings     Negatives:   Signs/symptoms of thrombosis, Signs/symptoms of bleeding,   Laboratory test error suspected, Change in health, Change in alcohol use,   Change in activity, Upcoming invasive procedure, Emergency department   visit, Upcoming dental procedure, Missed doses, Extra doses, Change in   medications, Change in diet/appetite, Hospital admission, Bruising, Other   complaints      Clinical Outcomes     Negatives:   Major bleeding event, Thromboembolic event,   Anticoagulation-related hospital admission, Anticoagulation-related ED   visit, Anticoagulation-related fatality        INR History:  Anticoagulation Monitoring 9/18/2018 10/2/2018 10/30/2018   INR 2.2 2.7 2.4   INR Date 9/18/2018 10/2/2018 10/30/2018   INR Goal 2.0-3.0 2.0-3.0 2.0-3.0   Trend Same Same Same   Last Week Total 90 mg 90 mg 90 mg   Next Week Total 90 mg 90 mg 90 mg   Sun 12 mg 12 mg 12 mg   Mon 15 mg 15 mg 15 mg   Tue 12 mg 12 mg 12 mg   Wed 12 mg 12 mg 12 mg   Thu 12 mg 12 mg 12 mg   Fri 15 mg 15 mg 15 mg   Sat  12 mg 12 mg 12 mg   Visit Report - - -   Some recent data might be hidden       Plan:  1. INR is therapeutic today- see above in Anticoagulation Summary.   Will instruct Agnieszka Rizzo to continue their warfarin regimen- see above in Anticoagulation Summary.  2. Follow up in 0 weeks.  3. Patient declines warfarin refills.  4. Verbal and written information provided. Patient expresses understanding and has no further questions at this time.    Reema Dorantes

## 2018-10-31 RX ORDER — VERAPAMIL HYDROCHLORIDE 360 MG/1
CAPSULE, DELAYED RELEASE PELLETS ORAL
Qty: 30 CAPSULE | Refills: 0 | Status: SHIPPED | OUTPATIENT
Start: 2018-10-31 | End: 2018-12-05 | Stop reason: SDUPTHER

## 2018-11-20 ENCOUNTER — TELEPHONE (OUTPATIENT)
Dept: CARDIOLOGY | Facility: CLINIC | Age: 83
End: 2018-11-20

## 2018-11-20 ENCOUNTER — TELEPHONE (OUTPATIENT)
Dept: INFECTIOUS DISEASES | Facility: CLINIC | Age: 83
End: 2018-11-20

## 2018-11-20 DIAGNOSIS — I48.19 PERSISTENT ATRIAL FIBRILLATION (HCC): ICD-10-CM

## 2018-11-20 DIAGNOSIS — I34.0 NON-RHEUMATIC MITRAL REGURGITATION: ICD-10-CM

## 2018-11-20 DIAGNOSIS — I50.32 CHRONIC DIASTOLIC CHF (CONGESTIVE HEART FAILURE) (HCC): Primary | ICD-10-CM

## 2018-11-20 NOTE — TELEPHONE ENCOUNTER
Patient informed that script was sent in and that she is to continue medication until she sees Dr. Landry in clinic.

## 2018-11-20 NOTE — TELEPHONE ENCOUNTER
I called and spoke with her. She is going to continue warfarin not on a NOAC.  I told her you would contact her with an appt time and a plan of what to do when the rifampin is stopped pn 11/27    She is complaining of abdominal swelling but doesn't know if it's fluid or if she has just gained weight.  I put in orders for a BMP and a BNP

## 2018-11-20 NOTE — TELEPHONE ENCOUNTER
Please tell patient I am going to send her in one more month of therapy though tell her she might not have to be on it quite this long. Dr Robbins and I need to coordinate her stopping the antibiotics and decreasing her warfarin dose. So at the very least she needs to continue the antibiotics until she sees me on 11/27. Thanks.

## 2018-11-20 NOTE — TELEPHONE ENCOUNTER
Her INR is being managed by the pharmacist in the anticoagulation clinic.  I am going to forward this over to her so she will know what to do with the warfarin.  Thanks!

## 2018-11-20 NOTE — TELEPHONE ENCOUNTER
----- Message from Hayden Landry MD sent at 11/20/2018  9:54 AM EST -----  Regarding: ending antibiotics and on high dose coumaidn  Jillian Cardona,    Agnieszka Rizzo is scheduled to finish her antibiotics (azithro, rifampin, ethambutol) on 11/27/18. I thought we should make a strategy to coordinate this with lowering her warfarin dose. Should we just have her stop the warfarin today or sometime this week? Thanks!

## 2018-11-20 NOTE — TELEPHONE ENCOUNTER
Patient states that she only has enough azithromycin and rifampin to get through 11/23.  Patient has 4 ethambutol remaining, so she will be short 2 tablets to make it through the 23rd.  Patient has an appoint next Tues,11/27.  Her question is, does she need to continue medication until she see Dr. Landry or should she finish the medication she has remaining and also come off the warfarin?

## 2018-11-20 NOTE — TELEPHONE ENCOUNTER
You probably meant the question for Dr Robbins more so than me but just to give my answer: my understanding was that once she was off of the rifampin she could go back to DOAC. The rifampin/DOAC interaction is why we changed her over to warfarin.

## 2018-11-20 NOTE — TELEPHONE ENCOUNTER
I was under the impression the patient was going to be switching to a DOAC in November. Can you please advise if this is happening? We will then proceed from there. Thank you!

## 2018-11-21 ENCOUNTER — TELEPHONE (OUTPATIENT)
Dept: INFECTIOUS DISEASES | Facility: CLINIC | Age: 83
End: 2018-11-21

## 2018-11-21 RX ORDER — RIFAMPIN 300 MG/1
600 CAPSULE ORAL 3 TIMES WEEKLY
Qty: 12 CAPSULE | Refills: 0 | Status: SHIPPED | OUTPATIENT
Start: 2018-11-21 | End: 2018-11-27

## 2018-11-21 RX ORDER — ETHAMBUTOL HYDROCHLORIDE 400 MG/1
1200 TABLET, FILM COATED ORAL 3 TIMES WEEKLY
Qty: 18 TABLET | Refills: 0 | Status: SHIPPED | OUTPATIENT
Start: 2018-11-21 | End: 2018-11-27

## 2018-11-21 RX ORDER — AZITHROMYCIN 500 MG/1
500 TABLET, FILM COATED ORAL 3 TIMES WEEKLY
Qty: 6 TABLET | Refills: 0 | Status: SHIPPED | OUTPATIENT
Start: 2018-11-21 | End: 2018-11-27

## 2018-11-21 NOTE — TELEPHONE ENCOUNTER
Thanks! Feel free to call my office at [phone] and I'm happy to discuss any time. But the short of it is that I'll be hoping to stop her rifampin next week ~11/27 or can do a few days later if needed.

## 2018-11-21 NOTE — TELEPHONE ENCOUNTER
I will call and get her scheduled in the INR clinic first thing next week and come up with a plan for the rifampin discontinuation. Thank you for everyone's help in taking care of this patient.

## 2018-11-27 ENCOUNTER — ANTICOAGULATION VISIT (OUTPATIENT)
Dept: PHARMACY | Facility: HOSPITAL | Age: 83
End: 2018-11-27

## 2018-11-27 ENCOUNTER — LAB (OUTPATIENT)
Dept: LAB | Facility: HOSPITAL | Age: 83
End: 2018-11-27

## 2018-11-27 ENCOUNTER — OFFICE VISIT (OUTPATIENT)
Dept: INFECTIOUS DISEASES | Facility: CLINIC | Age: 83
End: 2018-11-27

## 2018-11-27 VITALS
BODY MASS INDEX: 22.26 KG/M2 | DIASTOLIC BLOOD PRESSURE: 77 MMHG | HEART RATE: 90 BPM | TEMPERATURE: 98 F | WEIGHT: 121 LBS | HEIGHT: 62 IN | SYSTOLIC BLOOD PRESSURE: 123 MMHG

## 2018-11-27 DIAGNOSIS — J47.1 BRONCHIECTASIS WITH ACUTE EXACERBATION (HCC): ICD-10-CM

## 2018-11-27 DIAGNOSIS — I34.0 NON-RHEUMATIC MITRAL REGURGITATION: ICD-10-CM

## 2018-11-27 DIAGNOSIS — I48.19 PERSISTENT ATRIAL FIBRILLATION (HCC): ICD-10-CM

## 2018-11-27 DIAGNOSIS — Z79.2 LONG TERM (CURRENT) USE OF ANTIBIOTICS: ICD-10-CM

## 2018-11-27 DIAGNOSIS — I48.20 CHRONIC ATRIAL FIBRILLATION (HCC): ICD-10-CM

## 2018-11-27 DIAGNOSIS — I50.32 CHRONIC DIASTOLIC CHF (CONGESTIVE HEART FAILURE) (HCC): ICD-10-CM

## 2018-11-27 DIAGNOSIS — A31.0 PULMONARY MYCOBACTERIUM AVIUM COMPLEX (MAC) INFECTION (HCC): Primary | ICD-10-CM

## 2018-11-27 DIAGNOSIS — Z79.01 ON WARFARIN THERAPY: ICD-10-CM

## 2018-11-27 LAB
ANION GAP SERPL CALCULATED.3IONS-SCNC: 13.2 MMOL/L
BUN BLD-MCNC: 23 MG/DL (ref 8–23)
BUN/CREAT SERPL: 26.4 (ref 7–25)
CALCIUM SPEC-SCNC: 9.4 MG/DL (ref 8.6–10.5)
CHLORIDE SERPL-SCNC: 96 MMOL/L (ref 98–107)
CO2 SERPL-SCNC: 29.8 MMOL/L (ref 22–29)
CREAT BLD-MCNC: 0.87 MG/DL (ref 0.57–1)
GFR SERPL CREATININE-BSD FRML MDRD: 61 ML/MIN/1.73
GLUCOSE BLD-MCNC: 102 MG/DL (ref 65–99)
INR PPP: 2.2 (ref 0.91–1.09)
NT-PROBNP SERPL-MCNC: 190.8 PG/ML (ref 0–1800)
POTASSIUM BLD-SCNC: 3.5 MMOL/L (ref 3.5–5.2)
PROTHROMBIN TIME: 26.8 SECONDS (ref 10–13.8)
SODIUM BLD-SCNC: 139 MMOL/L (ref 136–145)

## 2018-11-27 PROCEDURE — 85610 PROTHROMBIN TIME: CPT

## 2018-11-27 PROCEDURE — 83880 ASSAY OF NATRIURETIC PEPTIDE: CPT

## 2018-11-27 PROCEDURE — 99214 OFFICE O/P EST MOD 30 MIN: CPT | Performed by: INTERNAL MEDICINE

## 2018-11-27 PROCEDURE — G0463 HOSPITAL OUTPT CLINIC VISIT: HCPCS | Performed by: PHARMACIST

## 2018-11-27 PROCEDURE — 36415 COLL VENOUS BLD VENIPUNCTURE: CPT

## 2018-11-27 PROCEDURE — 36416 COLLJ CAPILLARY BLOOD SPEC: CPT

## 2018-11-27 PROCEDURE — 80048 BASIC METABOLIC PNL TOTAL CA: CPT

## 2018-11-27 NOTE — PROGRESS NOTES
Anticoagulation Clinic Progress Note    Anticoagulation Summary  As of 2018    INR goal:   2.0-3.0   TTR:   100.0 % (2.5 mo)   INR used for dosin.2 (2018)   Warfarin maintenance plan:   10 mg every day   Weekly warfarin total:   70 mg   Plan last modified:   Arely Champion [facility] (2018)   Next INR check:   2018   Priority:   Critical   Target end date:       Indications    Persistent atrial fibrillation (CMS/HCC) [I48.1]             Anticoagulation Episode Summary     INR check location:       Preferred lab:       Send INR reminders to:   [facility]    Comments:         Anticoagulation Care Providers     Provider Role Specialty Phone number    Marcie Robbins MD Referring Cardiology [phone]    Neha Sales [facility] Responsible Pharmacy [phone]          Drug interactions: has changed in the following manner : stopping azithromycin and rifampin.  Diet: has remained unchanged.    Clinic Interview:  Patient Findings     Positives:   Change in medications    Negatives:   Signs/symptoms of thrombosis, Signs/symptoms of bleeding,   Laboratory test error suspected, Change in health, Change in alcohol use,   Change in activity, Upcoming invasive procedure, Emergency department   visit, Upcoming dental procedure, Missed doses, Extra doses, Change in   diet/appetite, Hospital admission, Bruising, Other complaints    Comments:   Stopping rifampin, azithromycin, and ethambutol today      Clinical Outcomes     Negatives:   Major bleeding event, Thromboembolic event,   Anticoagulation-related hospital admission, Anticoagulation-related ED   visit, Anticoagulation-related fatality    Comments:   Stopping rifampin, azithromycin, and ethambutol today        INR History:  Anticoagulation Monitoring 10/2/2018 10/30/2018 2018   INR 2.7 2.4 2.2   INR Date 10/2/2018 10/30/2018 2018   INR Goal 2.0-3.0 2.0-3.0 2.0-3.0   Trend Same Same Down   Last Week Total 90 mg 90 mg  87.5 mg   Next Week Total 90 mg 90 mg 70 mg   Sun 12 mg 12 mg -   Mon 15 mg 15 mg -   Tue 12 mg 12 mg 10 mg   Wed 12 mg 12 mg 10 mg   Thu 12 mg 12 mg 10 mg   Fri 15 mg 15 mg -   Sat 12 mg 12 mg -   Visit Report - - -   Some recent data might be hidden       Plan:  1. INR is Therapeutic today- see above in Anticoagulation Summary.  Will instruct Agnieszka Rizzo to Change their warfarin regimen- see above in Anticoagulation Summary. Decreased 20%- check every 2-3 days until stable  2. Follow up in 3 days  3. Patient declines warfarin refills.  4. Verbal and written information provided. Patient expresses understanding and has no further questions at this time.    Arely Champion [facility]

## 2018-11-27 NOTE — PROGRESS NOTES
"CC: f/u pulmonary MAC    HPI: Agnieszka Rizzo \"Renny\" is a 88 y.o. female here for f/u pulmonary MAC. She has now completed 12 months of the 3-drug regimen. She tolerated the antibiotics well. She continues to follow w/ pulmonary MDs. She is using vest therapy and nebulizers. She has some creamy sputum after vest therapy. No associated fevers. She continues to wear 2L O2 at night.    In terms of C diff, her stools are currently formed other than a few nights ago when she had transient nausea and diarrhea that is now resolved. Several neighbors had similar symptoms.    She has been working closely with cardiology to taper the warfarin now that I am stopping her rifampin.      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 LVQ - \"it just makes me sick\"  2. PCN (tolerates CTX and cefepime) - rash > 60 years ago  3. Erythromycin - tolerates azithromycin    Medications:   Current Outpatient Medications:   •  ADVAIR -21 MCG/ACT inhaler, Inhale 2 puffs 2 (Two) Times a Day., Disp: 1 inhaler, Rfl: 0  •  atorvastatin (LIPITOR) 40 MG tablet, TAKE 1 TABLET EVERY DAY, Disp: 90 tablet, Rfl: 3  •  azithromycin (ZITHROMAX) 500 MG tablet, Take 1 tablet by mouth 3 (Three) Times a Week for 14 days., Disp: 6 tablet, Rfl: 0  •  calcium carbonate (OS-EVIN) 600 MG tablet, Take 600 mg by mouth Daily., Disp: , Rfl:   •  cetirizine (zyrTEC) 10 MG tablet, Take 10 mg by mouth Daily., Disp: , Rfl:   •  cholecalciferol (VITAMIN D3) 1000 UNITS tablet, Take 1,000 Units by mouth Daily., Disp: , Rfl:   •  ethambutol (MYAMBUTOL) 400 MG tablet, Take 3 tablets by mouth 3 " "(Three) Times a Week for 14 days., Disp: 18 tablet, Rfl: 0  •  ezetimibe (ZETIA) 10 MG tablet, TAKE 1 TABLET EVERY DAY, Disp: 90 tablet, Rfl: 3  •  fluticasone (FLONASE) 50 MCG/ACT nasal spray, 2 sprays into each nostril Daily., Disp: , Rfl:   •  glucosamine-chondroitin 500-400 MG capsule capsule, Take 1 capsule by mouth Daily As Needed., Disp: , Rfl:   •  Lactobacillus (ACIDOPHILUS PO), Take 2 tablets by mouth Daily., Disp: , Rfl:   •  LITTLE NOSES SALINE NASAL MIST aerosol solution, 1 spray into each nostril 2 (Two) Times a Day., Disp: , Rfl:   •  potassium chloride (K-DUR,KLOR-CON) 10 MEQ CR tablet, TAKE 1 TABLET EVERY DAY, Disp: 90 tablet, Rfl: 3  •  rifAMPin (RIFADIN) 300 MG capsule, Take 2 capsules by mouth 3 (Three) Times a Week for 14 days., Disp: 12 capsule, Rfl: 0  •  torsemide (DEMADEX) 20 MG tablet, TAKE 1 TABLET TWICE DAILY, Disp: 180 tablet, Rfl: 3  •  verapamil PM (VERELAN PM) 360 MG 24 hr capsule, TAKE 1 CAPSULE BY MOUTH EVERY NIGHT, Disp: 30 capsule, Rfl: 0  •  warfarin (COUMADIN) 3 MG tablet, Take 2 tablets by mouth Daily. or as directed. (Patient taking differently: 12 mg on Tuesday, Wednesday, Thursday, Saturday, Sunday), Disp: 180 tablet, Rfl: 0  •  warfarin (COUMADIN) 5 MG tablet, TAKE 2 TABLETS BY MOUTH DAILY OR AS DIRECTED (Patient taking differently: take 15 mg on Monday and Friday), Disp: 180 tablet, Rfl: 1    OBJECTIVE:  /77   Pulse 90   Temp 98 °F (36.7 °C)   Ht 157.5 cm (62.01\")   Wt 54.9 kg (121 lb) Comment: per pt  BMI 22.13 kg/m²     General: awake, alert, NAD  Head: Normocephalic  Eyes: no scleral icterus  ENT: MMM, no thrush  Neck: Supple  Cardiovascular: NR, no LE edema  Respiratory: lungs are clear; normal WOB on RA; no wheezing  GI: Abdomen is non-distended  Musculoskeletal: no joint abnormalities, normal musculature  Neurological: Alert and oriented x 3, motor strength 5/5 in all four extremities  Psychiatric: Normal mood and affect       DIAGNOSTICS:  BMP, INR " reviewed today  Lab Results   Component Value Date    WBC 9.52 07/24/2018    HGB 12.7 07/24/2018    HCT 40.1 07/24/2018     07/24/2018     Lab Results   Component Value Date    GLUCOSE 102 (H) 11/27/2018    BUN 23 11/27/2018    CREATININE 0.87 11/27/2018    EGFRIFNONA 61 11/27/2018    EGFRIFAFRI 96 06/20/2017    BCR 26.4 (H) 11/27/2018    CO2 29.8 (H) 11/27/2018    CALCIUM 9.4 11/27/2018    ALBUMIN 4.50 07/24/2018    AST 24 07/24/2018    ALT 20 07/24/2018     Lab Results   Component Value Date    INR 2.2 (H) 11/27/2018    INR 2.4 (H) 10/30/2018    INR 2.7 (H) 10/02/2018    PROTIME 26.8 (H) 11/27/2018    PROTIME 29.3 (H) 10/30/2018    PROTIME 31.8 (H) 10/02/2018     Microbiology:  11/12/2016: Bronch Cx + MAC  5/31 Sputum Cx: normal fortino  6/3 bronch culture: mixed fortino  6/3 bronch AFB: no AFB at 6 weeks  6/3 bronch fungal: rare fungal elements on gram stain      Radiology (personally reviewed):   No new imaging since last visit    ASSESSMENT/PLAN:  1. 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 C diff colitis  -previously treated; no current symptoms  -continue probiotic    3. Chronic atrial fibrillation  -stopping rifampin so patient's warfarin is being adjusted by cardiology INR clinic    4. Long term use of antibiotics    RTC 6 months

## 2018-11-30 ENCOUNTER — ANTICOAGULATION VISIT (OUTPATIENT)
Dept: PHARMACY | Facility: HOSPITAL | Age: 83
End: 2018-11-30

## 2018-11-30 ENCOUNTER — APPOINTMENT (OUTPATIENT)
Dept: LAB | Facility: HOSPITAL | Age: 83
End: 2018-11-30

## 2018-11-30 ENCOUNTER — HOSPITAL ENCOUNTER (OUTPATIENT)
Dept: CT IMAGING | Facility: HOSPITAL | Age: 83
Discharge: HOME | End: 2018-11-30
Attending: INTERNAL MEDICINE | Admitting: INTERNAL MEDICINE

## 2018-11-30 DIAGNOSIS — A31.0 PULMONARY MYCOBACTERIUM AVIUM COMPLEX (MAC) INFECTION (HCC): ICD-10-CM

## 2018-11-30 DIAGNOSIS — I48.19 PERSISTENT ATRIAL FIBRILLATION (HCC): ICD-10-CM

## 2018-11-30 LAB
INR PPP: 1.6 (ref 0.91–1.09)
PROTHROMBIN TIME: 19.1 SECONDS (ref 10–13.8)

## 2018-11-30 PROCEDURE — 87116 MYCOBACTERIA CULTURE: CPT | Performed by: INTERNAL MEDICINE

## 2018-11-30 PROCEDURE — 71250 CT THORAX DX C-: CPT

## 2018-11-30 PROCEDURE — G0463 HOSPITAL OUTPT CLINIC VISIT: HCPCS | Performed by: PHARMACIST

## 2018-11-30 PROCEDURE — 87206 SMEAR FLUORESCENT/ACID STAI: CPT | Performed by: INTERNAL MEDICINE

## 2018-11-30 PROCEDURE — 36416 COLLJ CAPILLARY BLOOD SPEC: CPT

## 2018-11-30 PROCEDURE — 85610 PROTHROMBIN TIME: CPT

## 2018-11-30 NOTE — PROGRESS NOTES
Anticoagulation Clinic Progress Note    Anticoagulation Summary  As of 2018    INR goal:   2.0-3.0   TTR:   97.5 % (2.6 mo)   INR used for dosin.6! (2018)   Warfarin maintenance plan:   10 mg every day   Weekly warfarin total:   70 mg   Plan last modified:   Arely Champion [facility] (2018)   Next INR check:   12/3/2018   Priority:   Critical   Target end date:       Indications    Persistent atrial fibrillation (CMS/HCC) [I48.1]             Anticoagulation Episode Summary     INR check location:       Preferred lab:       Send INR reminders to:   [facility] CLINICAL [place]    Comments:         Anticoagulation Care Providers     Provider Role Specialty Phone number    Marcie Robbins MD Referring Cardiology [phone]    Neha Sales [facility] Responsible Pharmacy [phone]          Drug interactions: has changed in the following manner : stopped rifampin - WATCH.  Diet: has remained unchanged.    Clinic Interview:  Patient Findings     Negatives:   Signs/symptoms of thrombosis, Signs/symptoms of bleeding,   Laboratory test error suspected, Change in health, Change in alcohol use,   Change in activity, Upcoming invasive procedure, Emergency department   visit, Upcoming dental procedure, Missed doses, Extra doses, Change in   medications, Change in diet/appetite, Hospital admission, Bruising, Other   complaints      Clinical Outcomes     Negatives:   Major bleeding event, Thromboembolic event,   Anticoagulation-related hospital admission, Anticoagulation-related ED   visit, Anticoagulation-related fatality        INR History:  Anticoagulation Monitoring 10/30/2018 2018 2018   INR 2.4 2.2 1.6   INR Date 10/30/2018 2018 2018   INR Goal 2.0-3.0 2.0-3.0 2.0-3.0   Trend Same Down Same   Last Week Total 90 mg 87.5 mg 80 mg   Next Week Total 90 mg 70 mg 70 mg   Sun 12 mg - 10 mg   Mon 15 mg - -   Tue 12 mg 10 mg -   Wed 12 mg 10 mg -   Thu 12 mg 10 mg -   Fri 15 mg - 10  mg   Sat 12 mg - 10 mg   Visit Report - - -   Some recent data might be hidden       Plan:  1. INR is Subtherapeutic today- see above in Anticoagulation Summary.  Will instruct Agnieszka Rizzo to Continue their warfarin regimen- see above in Anticoagulation Summary.  2. Follow up in 3 days  3. Patient declines warfarin refills.  4. Verbal and written information provided. Patient expresses understanding and has no further questions at this time.    Arely Champion [facility]

## 2018-12-03 ENCOUNTER — TELEPHONE (OUTPATIENT)
Dept: INFECTIOUS DISEASES | Facility: CLINIC | Age: 83
End: 2018-12-03

## 2018-12-03 ENCOUNTER — ANTICOAGULATION VISIT (OUTPATIENT)
Dept: PHARMACY | Facility: HOSPITAL | Age: 83
End: 2018-12-03

## 2018-12-03 DIAGNOSIS — I48.19 PERSISTENT ATRIAL FIBRILLATION (HCC): ICD-10-CM

## 2018-12-03 LAB
INR PPP: 1.5 (ref 0.91–1.09)
PROTHROMBIN TIME: 18.3 SECONDS (ref 10–13.8)

## 2018-12-03 PROCEDURE — 36416 COLLJ CAPILLARY BLOOD SPEC: CPT

## 2018-12-03 PROCEDURE — 85610 PROTHROMBIN TIME: CPT

## 2018-12-03 NOTE — PROGRESS NOTES
Anticoagulation Clinic Progress Note    Anticoagulation Summary  As of 12/3/2018    INR goal:   2.0-3.0   TTR:   93.9 % (2.7 mo)   INR used for dosin.5! (12/3/2018)   Warfarin maintenance plan:   12.5 mg on Mon; 10 mg all other days   Weekly warfarin total:   72.5 mg   Plan last modified:   Arely Champion [facility] (12/3/2018)   Next INR check:   2018   Priority:   Critical   Target end date:       Indications    Persistent atrial fibrillation (CMS/HCC) [I48.1]             Anticoagulation Episode Summary     INR check location:       Preferred lab:       Send INR reminders to:   [facility] [place]    Comments:         Anticoagulation Care Providers     Provider Role Specialty Phone number    Marcie Robbins MD Referring Cardiology [phone]    Neha Sales [facility] Responsible Pharmacy [phone]          Drug interactions: has remained unchanged.  Diet: has remained unchanged.    Clinic Interview:  Patient Findings     Negatives:   Signs/symptoms of thrombosis, Signs/symptoms of bleeding,   Laboratory test error suspected, Change in health, Change in alcohol use,   Change in activity, Upcoming invasive procedure, Emergency department   visit, Upcoming dental procedure, Missed doses, Extra doses, Change in   medications, Change in diet/appetite, Hospital admission, Bruising, Other   complaints      Clinical Outcomes     Negatives:   Major bleeding event, Thromboembolic event,   Anticoagulation-related hospital admission, Anticoagulation-related ED   visit, Anticoagulation-related fatality        INR History:  Anticoagulation Monitoring 2018 2018 12/3/2018   INR 2.2 1.6 1.5   INR Date 2018 2018 12/3/2018   INR Goal 2.0-3.0 2.0-3.0 2.0-3.0   Trend Down Same Up   Last Week Total 87.5 mg 80 mg 72.5 mg   Next Week Total 70 mg 70 mg 72.5 mg   Sun - 10 mg -   Mon - - 12.5 mg (12/3)   Tue 10 mg - 10 mg   Wed 10 mg - 10 mg   Thu 10 mg - -   Fri - 10 mg -   Sat - 10 mg -   Visit  Report - - -   Some recent data might be hidden       Plan:  1. INR is therapeutic today- see above in Anticoagulation Summary.   Will instruct Agnieszka Rizzo to continue their warfarin regimen- see above in Anticoagulation Summary.  2. Follow up in 3 days..  3. Patient declines warfarin refills.  4. Verbal and written information provided. Patient expresses understanding and has no further questions at this time.    Reema Dorantes

## 2018-12-03 NOTE — TELEPHONE ENCOUNTER
----- Message from Hayden Landry MD sent at 12/3/2018 12:28 PM EST -----  Please let patient know the CAT scan of her chest looks much improved to the scan she had done in May 2017. Please ask her to call me if any new respiratory symptoms arise.

## 2018-12-05 ENCOUNTER — TELEPHONE (OUTPATIENT)
Dept: CARDIOLOGY | Facility: CLINIC | Age: 83
End: 2018-12-05

## 2018-12-05 RX ORDER — VERAPAMIL HYDROCHLORIDE 360 MG/1
360 CAPSULE, DELAYED RELEASE PELLETS ORAL NIGHTLY
Qty: 90 CAPSULE | Refills: 3 | Status: SHIPPED | OUTPATIENT
Start: 2018-12-05 | End: 2019-09-23 | Stop reason: SDUPTHER

## 2018-12-05 NOTE — TELEPHONE ENCOUNTER
I think her labs look fine.  The proBNP indicates that she has no congestive heart failure.  Kidney function is normal.  Electrolytes are normal.  We can discuss that more detail at her follow-up appointment on December 13

## 2018-12-05 NOTE — TELEPHONE ENCOUNTER
Pt would like to know recommendations from labs obtained last week.    proBNP   Order: [id number]   Status:  Final result   Visible to patient:  No (Not Released)   Dx:  Persistent atrial fibrillation (CMS/H...    Ref Range & Units 8d ago   proBNP 0.0-1,800.0 pg/mL 190.8    Resulting Agency  [facility]      Narrative   Performed by: [facility]   Among patients with dyspnea, NT-proBNP is highly sensitive for the detection of acute congestive heart failure. In addition NT-proBNP of <300 pg/ml effectively rules out acute congestive heart failure with 99% negative predictive value.      Specimen Collected: 11/27/18 12:05 Last Resulted: 11/27/18 12:54                Basic Metabolic Panel   Order: [id number]   Status:  Final result   Visible to patient:  No (Not Released)   Dx:  Persistent atrial fibrillation (CMS/H...    Ref Range & Units 8d ago   Glucose 65 - 99 mg/dL 102 Abnormally high     BUN 8 - 23 mg/dL 23    Creatinine 0.57 - 1.00 mg/dL 0.87    Sodium 136 - 145 mmol/L 139    Potassium 3.5 - 5.2 mmol/L 3.5    Chloride 98 - 107 mmol/L 96 Abnormally low     CO2 22.0 - 29.0 mmol/L 29.8 Abnormally high     Calcium 8.6 - 10.5 mg/dL 9.4    eGFR Non African Amer >60 mL/min/1.73 61    BUN/Creatinine Ratio 7.0 - 25.0 26.4 Abnormally high     Anion Gap mmol/L 13.2

## 2018-12-06 ENCOUNTER — ANTICOAGULATION VISIT (OUTPATIENT)
Dept: PHARMACY | Facility: HOSPITAL | Age: 83
End: 2018-12-06

## 2018-12-06 DIAGNOSIS — I48.19 PERSISTENT ATRIAL FIBRILLATION (HCC): ICD-10-CM

## 2018-12-06 LAB
INR PPP: 2 (ref 0.91–1.09)
PROTHROMBIN TIME: 23.6 SECONDS (ref 10–13.8)

## 2018-12-06 PROCEDURE — 85610 PROTHROMBIN TIME: CPT

## 2018-12-06 PROCEDURE — 36416 COLLJ CAPILLARY BLOOD SPEC: CPT

## 2018-12-06 RX ORDER — WARFARIN SODIUM 5 MG/1
TABLET ORAL
Qty: 210 TABLET | Refills: 1 | Status: SHIPPED | OUTPATIENT
Start: 2018-12-06 | End: 2018-12-13 | Stop reason: SDUPTHER

## 2018-12-06 NOTE — PROGRESS NOTES
Anticoagulation Clinic Progress Note    Anticoagulation Summary  As of 2018    INR goal:   2.0-3.0   TTR:   90.5 % (2.8 mo)   INR used for dosin.0 (2018)   Warfarin maintenance plan:   12.5 mg on Mon, Fri; 10 mg all other days   Weekly warfarin total:   75 mg   Plan last modified:   Arely Champion [facility] (2018)   Next INR check:   12/10/2018   Priority:   Critical   Target end date:       Indications    Persistent atrial fibrillation (CMS/HCC) [I48.1]             Anticoagulation Episode Summary     INR check location:       Preferred lab:       Send INR reminders to:    [facility] [place]    Comments:         Anticoagulation Care Providers     Provider Role Specialty Phone number    Marcie Robbins MD Referring Cardiology [phone]    Neha Sales [facility] [phone]          Drug interactions: has remained unchanged.  Diet: has remained unchanged.    Clinic Interview:  Patient Findings     Negatives:   Signs/symptoms of thrombosis, Signs/symptoms of bleeding,   Laboratory test error suspected, Change in health, Change in alcohol use,   Change in activity, Upcoming invasive procedure, Emergency department   visit, Upcoming dental procedure, Missed doses, Extra doses, Change in   medications, Change in diet/appetite, Hospital admission, Bruising, Other   complaints      Clinical Outcomes     Negatives:   Major bleeding event, Thromboembolic event,   Anticoagulation-related hospital admission, Anticoagulation-related ED   visit, Anticoagulation-related fatality        INR History:  Anticoagulation Monitoring 2018 12/3/2018 2018   INR 1.6 1.5 2.0   INR Date 2018 12/3/2018 2018   INR Goal 2.0-3.0 2.0-3.0 2.0-3.0   Trend Same Up Up   Last Week Total 80 mg 72.5 mg 72.5 mg   Next Week Total 70 mg 72.5 mg 75 mg   Sun 10 mg - 10 mg   Mon - 12.5 mg (12/3) -   Tue - 10 mg -   Wed - 10 mg -   Thu - - 10 mg   Fri 10 mg - 12.5 mg   Sat 10 mg - 10 mg    Visit Report - - -   Some recent data might be hidden       Plan:  1. INR is Therapeutic today- see above in Anticoagulation Summary.  Will instruct Agnieszka Rizzo to Continue their warfarin regimen- see above in Anticoagulation Summary.  2. Follow up in 3 days  3. Patient desires warfarin refills.  4. Verbal and written information provided. Patient expresses understanding and has no further questions at this time.    Arely Champion, [facility]

## 2018-12-10 ENCOUNTER — ANTICOAGULATION VISIT (OUTPATIENT)
Dept: PHARMACY | Facility: HOSPITAL | Age: 83
End: 2018-12-10

## 2018-12-10 DIAGNOSIS — I48.19 PERSISTENT ATRIAL FIBRILLATION (HCC): ICD-10-CM

## 2018-12-10 LAB
INR PPP: 3.5 (ref 0.91–1.09)
PROTHROMBIN TIME: 41.5 SECONDS (ref 10–13.8)

## 2018-12-10 PROCEDURE — 36416 COLLJ CAPILLARY BLOOD SPEC: CPT

## 2018-12-10 PROCEDURE — 85610 PROTHROMBIN TIME: CPT

## 2018-12-10 PROCEDURE — G0463 HOSPITAL OUTPT CLINIC VISIT: HCPCS

## 2018-12-10 NOTE — PROGRESS NOTES
Anticoagulation Clinic Progress Note    Anticoagulation Summary  As of 12/10/2018    INR goal:   2.0-3.0   TTR:   89.4 % (2.9 mo)   INR used for dosing:   3.5! (12/10/2018)   Warfarin maintenance plan:   7.5 mg on Mon, Wed, Fri; 10 mg all other days   Weekly warfarin total:   62.5 mg   Plan last modified:   Danial Louie RPH (12/10/2018)   Next INR check:   12/13/2018   Priority:   High   Target end date:       Indications    Persistent atrial fibrillation (CMS/HCC) [I48.1]             Anticoagulation Episode Summary     INR check location:       Preferred lab:       Send INR reminders to:    [facility]    Comments:         Anticoagulation Care Providers     Provider Role Specialty Phone number    Marcie Robbins MD Referring Cardiology [phone]          Drug interactions: has remained unchanged.  Diet: has remained unchanged.    Clinic Interview:  Patient Findings     Negatives:   Signs/symptoms of thrombosis, Signs/symptoms of bleeding,   Laboratory test error suspected, Change in health, Change in alcohol use,   Change in activity, Upcoming invasive procedure, Emergency department   visit, Upcoming dental procedure, Missed doses, Extra doses, Change in   medications, Change in diet/appetite, Hospital admission, Bruising, Other   complaints      Clinical Outcomes     Negatives:   Major bleeding event, Thromboembolic event,   Anticoagulation-related hospital admission, Anticoagulation-related ED   visit, Anticoagulation-related fatality        INR History:  Anticoagulation Monitoring 12/3/2018 12/6/2018 12/10/2018   INR 1.5 2.0 3.5   INR Date 12/3/2018 12/6/2018 12/10/2018   INR Goal 2.0-3.0 2.0-3.0 2.0-3.0   Trend Up Up Down   Last Week Total 72.5 mg 72.5 mg 75 mg   Next Week Total 72.5 mg 75 mg 62.5 mg   Sun - 10 mg -   Mon 12.5 mg (12/3) - 7.5 mg   Tue 10 mg - 10 mg   Wed 10 mg - 7.5 mg   Thu - 10 mg -   Fri - 12.5 mg -   Sat - 10 mg -   Visit Report - - -   Some recent data might be  hidden       Plan:  1. INR is Supratherapeutic today- see above in Anticoagulation Summary.  Will instruct Agnieszka Rizzo to Change their warfarin regimen- see above in Anticoagulation Summary.  2. Follow up in 3 days. Will continue to monitor closely since patient has now been off rifampin for ~2 weeks  3. Patient declines warfarin refills.  4. Verbal and written information provided. Patient expresses understanding and has no further questions at this time.    Danial Louie [facility]

## 2018-12-13 ENCOUNTER — OFFICE VISIT (OUTPATIENT)
Dept: CARDIOLOGY | Facility: CLINIC | Age: 83
End: 2018-12-13

## 2018-12-13 ENCOUNTER — APPOINTMENT (OUTPATIENT)
Dept: PHARMACY | Facility: HOSPITAL | Age: 83
End: 2018-12-13

## 2018-12-13 ENCOUNTER — ANTICOAGULATION VISIT (OUTPATIENT)
Dept: PHARMACY | Facility: HOSPITAL | Age: 83
End: 2018-12-13

## 2018-12-13 VITALS
DIASTOLIC BLOOD PRESSURE: 80 MMHG | HEIGHT: 62 IN | WEIGHT: 125 LBS | RESPIRATION RATE: 16 BRPM | SYSTOLIC BLOOD PRESSURE: 136 MMHG | HEART RATE: 73 BPM | BODY MASS INDEX: 23 KG/M2

## 2018-12-13 DIAGNOSIS — E78.2 MIXED HYPERLIPIDEMIA: ICD-10-CM

## 2018-12-13 DIAGNOSIS — I48.91 ATRIAL FIBRILLATION WITH RAPID VENTRICULAR RESPONSE (HCC): ICD-10-CM

## 2018-12-13 DIAGNOSIS — R10.13 EPIGASTRIC PAIN: ICD-10-CM

## 2018-12-13 DIAGNOSIS — I48.19 PERSISTENT ATRIAL FIBRILLATION (HCC): ICD-10-CM

## 2018-12-13 DIAGNOSIS — R07.2 PRECORDIAL PAIN: Primary | ICD-10-CM

## 2018-12-13 DIAGNOSIS — I25.10 CHRONIC CORONARY ARTERY DISEASE: ICD-10-CM

## 2018-12-13 DIAGNOSIS — I49.3 VENTRICULAR PREMATURE BEATS: ICD-10-CM

## 2018-12-13 DIAGNOSIS — I50.32 CHRONIC DIASTOLIC CONGESTIVE HEART FAILURE (HCC): ICD-10-CM

## 2018-12-13 LAB
INR PPP: 4.4 (ref 0.91–1.09)
PROTHROMBIN TIME: 53 SECONDS (ref 10–13.8)

## 2018-12-13 PROCEDURE — 36416 COLLJ CAPILLARY BLOOD SPEC: CPT

## 2018-12-13 PROCEDURE — G0463 HOSPITAL OUTPT CLINIC VISIT: HCPCS

## 2018-12-13 PROCEDURE — 85610 PROTHROMBIN TIME: CPT

## 2018-12-13 PROCEDURE — 99214 OFFICE O/P EST MOD 30 MIN: CPT | Performed by: INTERNAL MEDICINE

## 2018-12-13 PROCEDURE — 93000 ELECTROCARDIOGRAM COMPLETE: CPT | Performed by: INTERNAL MEDICINE

## 2018-12-13 RX ORDER — WARFARIN SODIUM 3 MG/1
6 TABLET ORAL DAILY
Qty: 90 TABLET | Refills: 0 | Status: SHIPPED | OUTPATIENT
Start: 2018-12-13 | End: 2018-12-13 | Stop reason: SDUPTHER

## 2018-12-13 NOTE — PROGRESS NOTES
PATIENTINFORMATION    Date of Office Visit: 2018  Encounter Provider: Marcie Robbins MD  Place of Service: [facility] CARDIOLOGY  Patient Name: Agnieszka Rizzo  : 1930    Subjective:     Encounter Date:2018      Patient ID: Agnieszka Rizzo is a 88 y.o. female.      History of Present Illness    This is a lady I met while she was hospitalized in 2016. She has a significant pulmonary history and was having a bronchoscopy and unfortunately developed a pneumothorax. She was found to be in rate -controlled atrial fibrillation. She had previously been seen by Dr. Ana Goodrich for history of hypertension, hyperlipidemia, mild mitral valve prolapse and nonobstructive coronary artery disease diagnosed by heart catheterization in .      Echocardiogram November 15, 2016:  All left ventricular wall segments contract normally.  Left ventricular function is normal. Estimated EF = 58%.  Left atrial cavity size is moderately dilated.  Mild tricuspid valve regurgitation is present.  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 They felt she would do well on oral anticoagulation and I started her on Pradaxa. I did not do a stress test while she was hospitalized because she was wheezing and I did not fill comfortable giving her Lexiscan at that time and I did not when he is dobutamine because of her atrial fibrillation.       She was able to have a stress as an outpt on 16 and this was normal. She continued to complain of dyspnea on exertion and so I had her set up for a cardioversion. She had a couple of hospitalizations in the meantime for respiratory issues. While she was in the hospital in 2017, I attempted to cardiovert her. I shocked her 3 times that she did not remain in sinus rhythm. I spoke with her family and we decided to  continue with rate control and anticoagulation.      She was hospitalized in 02/2017. She had started Voriconazole for treatment of pulmonary aspergillosis by Dr. Tucker. She became very nauseous and sick, and threw up for an entire day. She came into the hospital confused and weak. Her sodium was at 109. She was found to have a left clavicle fracture from a fall. [facility] where she has been. Unfortunately, as a result of the treatment for the aspergillosis, she developed C. difficile and was rehospitalized for treatment of this in March 2017. As a part of his treatment she did receive IV fluids. She was discharged but then I readmitted her on March 22 for IV diuresis. She was volume overloaded and in diastolic heart failure. She was back in the hospital on April 2 with more C. difficile colitis. Again she was treated with IV fluids and became volume overloaded. I diuresed her in the hospital and she was only mildly volume overloaded at discharge.     She was hospitalized from May 31 of June 9.  While there she had some rapid ventricular response due to her atrial fibrillation and being sick with community-acquired pneumonia.  Some changes were made to her medication but in the end she was discharged on verapamil 360 mg once a day and digoxin 0.125 mg a day.     She has overall been feeling okay.  She had one of episode of palpitations where she felt that her heart was going fast.  It was associated with chest pain along shoulder into her back pain.  She has not been feeling short of breath.  She recently came off of rifampin and she has been struggling to get her INR under control.  Her biggest complaint is that after eating she gets a lot of abdominal discomfort and then bloating.  She says that by afternoon, she feels very bloated.          Review of Systems   Constitution: Negative for fever, malaise/fatigue, weight gain and weight loss.   HENT: Negative for ear pain, hearing loss,  "nosebleeds and sore throat.    Eyes: Negative for double vision, pain, vision loss in left eye and vision loss in right eye.   Cardiovascular:        See history of present illness.   Respiratory: Negative for cough, shortness of breath, sleep disturbances due to breathing, snoring and wheezing.    Endocrine: Negative for cold intolerance, heat intolerance and polyuria.   Skin: Negative for itching, poor wound healing and rash.   Musculoskeletal: Negative for joint pain, joint swelling and myalgias.   Gastrointestinal: Negative for abdominal pain, diarrhea, hematochezia, nausea and vomiting.   Genitourinary: Negative for hematuria and hesitancy.   Neurological: Negative for numbness, paresthesias and seizures.   Psychiatric/Behavioral: Negative for depression. The patient is not nervous/anxious.            ECG 12 Lead  Date/Time: 12/13/2018 11:51 AM  Performed by: Marcie Robbins MD  Authorized by: Marcie Robbins MD   Comparison: compared with previous ECG from 8/16/2018  Similar to previous ECG  Rhythm: sinus rhythm  BPM: 73  Conduction: conduction normal  ST Segments: ST segments normal  T Waves: T waves normal  Clinical impression: normal ECG               Objective:     /80 (BP Location: Right arm, Patient Position: Sitting, Cuff Size: Adult)   Pulse 73   Resp 16   Ht 157.5 cm (62\")   Wt 56.7 kg (125 lb)   BMI 22.86 kg/m²  Body mass index is 22.86 kg/m².     Physical Exam   Constitutional: She appears well-developed.   HENT:   Head: Normocephalic and atraumatic.   Eyes: Conjunctivae and lids are normal. Pupils are equal, round, and reactive to light. Lids are everted and swept, no foreign bodies found.   Neck: Normal range of motion. No JVD present. Carotid bruit is not present. No tracheal deviation present. No thyroid mass present.   Cardiovascular: Normal rate, regular rhythm and normal heart sounds.   Pulses:       Dorsalis pedis pulses are 2+ on the right side, and 2+ on the left side. "   Pulmonary/Chest: Effort normal and breath sounds normal.   Abdominal: Normal appearance and bowel sounds are normal.   Musculoskeletal: Normal range of motion.   Neurological: She is alert. She has normal strength.   Skin: Skin is warm, dry and intact.   Psychiatric: She has a normal mood and affect. Her behavior is normal.   Vitals reviewed.          Assessment/Plan:       1. Atrial fibrillation. She failed cardioversion in January 2017. She has a CHADS2-Vasc score of 5. She is anticoagulated with warfarin.   she has been in sinus rhythm.  Most of her rhythm problems occurred while she was hospitalized and ill.  She has had a recent episode of palpitations.  I told her if this becomes more frequent to let me know and we will try to get a monitor on her and see if she's having recurrent A. fib.    Atrial Fibrillation and Atrial Flutter  Assessment  • The patient has persistent atrial fibrillation  • This is non-valvular in etiology  • The patient's CHADS2-VASc score is 5  • A [id number] score of 2 or more is considered a high risk for a thromboembolic event     Plan  • Attempt to maintain sinus rhythm  • Continue warfarin for antithrombotic therapy, bleeding issues discussed  • Continue verapamil for rhythm control     2. Chronic diastolic heart failure. She is on torsemide.  She had lab work done in November 2018 which showed her BNP was normal and her kidney function was stable.     3. Dyspnea on exertion. Multifactorial.  This is much improved since she has been exercising.     4. Mitral valve prolapse with very mild mitral regurgitation.     5. Mild tricuspid regurgitation without pulmonary hypertension.     6. Nonobstructive coronary artery disease diagnosed by catheter in 2007. She has noted coronary artery calcification on CT scans. Nuclear stress 12/16 was normal.  he has had some chest discomfort so I am point to repeat the stress test.    7. Hypertension. Her blood pressure is controlled.      8.  Hyperlipidemia. Zetia and atorvastatin.     9. Hyponatremia. Stable.     10. C. difficile diarrhea. 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  She has completed her antibiotic regimen.  She has a follow-up appointment with Dr. Tucker early next year    12.  Abdominal pain and bloating.  She does have a history of an ulcer treated at [facility].  She is also had a history of very severe C. difficile.  I'm going to refer her to GI for further evaluation.    Will go over the results of her stress test when I have them.  I will plan on seeing her back in 6 months.    Orders Placed This Encounter   Procedures   • Ambulatory Referral to Gastroenterology     Referral Priority:   Routine     Referral Type:   Consultation     Referral Reason:   Specialty Services Required     Referred to Provider:   Jorge Wheeler MD     Requested Specialty:   Gastroenterology     Number of Visits Requested:   1   • Stress Test With Myocardial Perfusion One Day     Standing Status:   Future     Standing Expiration Date:   12/13/2019     Order Specific Question:   Rest/stress, rest only or stress only?     Answer:   Rest/Stress     Order Specific Question:   What stress agent will be used?     Answer:   Regadenoson (Lexiscan)     Order Specific Question:   Difficulty walking criteria?     Answer:   Musculoskeletal (hips, knees, feet, back, amputee)     Order Specific Question:   Reason for exam?     Answer:   Chest Pain   • ECG 12 Lead     This order was created via procedure documentation        Discharge Medications           Accurate as of 12/13/18  1:56 PM. If you have any questions, ask your nurse or doctor.               Changes to Medications      Instructions Start Date   warfarin 3 MG tablet  Commonly known as:  COUMADIN  What changed:    · how much to take  · how to take this  · when to take this  · additional instructions  · Another medication with the  same name was removed. Continue taking this medication, and follow the directions you see here.   6 mg, Oral, Daily, or as directed.         Continue These Medications      Instructions Start Date   ACIDOPHILUS PO   2 tablets, Oral, Daily      ADVAIR -21 MCG/ACT inhaler  Generic drug:  fluticasone-salmeterol   2 puffs, Inhalation, 2 Times Daily - RT      atorvastatin 40 MG tablet  Commonly known as:  LIPITOR   TAKE 1 TABLET EVERY DAY      calcium carbonate 600 MG tablet  Commonly known as:  OS-EVIN   600 mg, Oral, Daily      cetirizine 10 MG tablet  Commonly known as:  zyrTEC   10 mg, Oral, Daily      ezetimibe 10 MG tablet  Commonly known as:  ZETIA   TAKE 1 TABLET EVERY DAY      fluticasone 50 MCG/ACT nasal spray  Commonly known as:  FLONASE   2 sprays, Nasal, Daily      glucosamine-chondroitin 500-400 MG capsule capsule   1 capsule, Oral, Daily PRN      LITTLE NOSES SALINE NASAL MIST aerosol solution   1 spray, Nasal, 2 Times Daily      potassium chloride 10 MEQ CR tablet  Commonly known as:  K-DUR,KLOR-CON   TAKE 1 TABLET EVERY DAY      torsemide 20 MG tablet  Commonly known as:  DEMADEX   TAKE 1 TABLET TWICE DAILY      verapamil  MG 24 hr capsule  Commonly known as:  VERELAN PM   360 mg, Oral, Nightly         Stop These Medications    cholecalciferol 1000 units tablet  Commonly known as:  VITAMIN D3  Stopped by:  MD Marcie Small MD  12/13/18  1:56 PM

## 2018-12-13 NOTE — PROGRESS NOTES
Anticoagulation Clinic Progress Note    Anticoagulation Summary  As of 2018    INR goal:   2.0-3.0   TTR:   86.4 % (3 mo)   INR used for dosin.4! (2018)   Warfarin maintenance plan:   6 mg every day   Weekly warfarin total:   42 mg   Plan last modified:   Danial Louie RPH (2018)   Next INR check:   2018   Priority:   Critical   Target end date:       Indications    Persistent atrial fibrillation (CMS/HCC) [I48.1]             Anticoagulation Episode Summary     INR check location:       Preferred lab:       Send INR reminders to:   [facility] [place]    Comments:         Anticoagulation Care Providers     Provider Role Specialty Phone number    Marcie Robbins MD Referring Cardiology [phone]          Clinic Interview:  Patient Findings     Negatives:   Signs/symptoms of thrombosis, Signs/symptoms of bleeding,   Laboratory test error suspected, Change in health, Change in alcohol use,   Change in activity, Upcoming invasive procedure, Emergency department   visit, Upcoming dental procedure, Missed doses, Extra doses, Change in   medications, Change in diet/appetite, Hospital admission, Bruising, Other   complaints      Clinical Outcomes     Negatives:   Major bleeding event, Thromboembolic event,   Anticoagulation-related hospital admission, Anticoagulation-related ED   visit, Anticoagulation-related fatality        INR History:  Anticoagulation Monitoring 2018 12/10/2018 2018   INR 2.0 3.5 4.4   INR Date 2018 12/10/2018 2018   INR Goal 2.0-3.0 2.0-3.0 2.0-3.0   Trend Up Down Down   Last Week Total 72.5 mg 75 mg 67.5 mg   Next Week Total 75 mg 62.5 mg 30 mg   Sun 10 mg - 6 mg   Mon - 7.5 mg -   Tue - 10 mg -   Wed - 7.5 mg -   Thu 10 mg - Hold ()   Fri 12.5 mg - Hold ()   Sat 10 mg - 6 mg   Visit Report - - -   Some recent data might be hidden       Plan:  1. INR is Supratherapeutic today- see above in Anticoagulation Summary.  Will  instruct Agnieszka AGUIRRE Matthias to Change their warfarin regimen- see above in Anticoagulation Summary.  2. Follow up in 4 days. Will continue to monitor pt very closely since she is now trending high.  3. Patient declines and desires warfarin refills.  4. Verbal and written information provided. Patient expresses understanding and has no further questions at this time.    Danial Louie, [facility]

## 2018-12-14 RX ORDER — WARFARIN SODIUM 3 MG/1
6 TABLET ORAL DAILY
Qty: 180 TABLET | Refills: 0 | Status: SHIPPED | OUTPATIENT
Start: 2018-12-14 | End: 2019-01-21 | Stop reason: SDUPTHER

## 2018-12-17 ENCOUNTER — ANTICOAGULATION VISIT (OUTPATIENT)
Dept: PHARMACY | Facility: HOSPITAL | Age: 83
End: 2018-12-17

## 2018-12-17 DIAGNOSIS — I48.19 PERSISTENT ATRIAL FIBRILLATION (HCC): ICD-10-CM

## 2018-12-17 LAB
INR PPP: 2.3 (ref 0.91–1.09)
PROTHROMBIN TIME: 28.1 SECONDS (ref 10–13.8)

## 2018-12-17 PROCEDURE — 85610 PROTHROMBIN TIME: CPT

## 2018-12-17 PROCEDURE — 36416 COLLJ CAPILLARY BLOOD SPEC: CPT

## 2018-12-17 NOTE — PROGRESS NOTES
Anticoagulation Clinic Progress Note    Anticoagulation Summary  As of 2018    INR goal:   2.0-3.0   TTR:   84.1 % (3.1 mo)   INR used for dosin.3 (2018)   Warfarin maintenance plan:   6 mg every day   Weekly warfarin total:   42 mg   Plan last modified:   Danial Louie RPH (2018)   Next INR check:   2018   Priority:   Critical   Target end date:       Indications    Persistent atrial fibrillation (CMS/HCC) [I48.1]             Anticoagulation Episode Summary     INR check location:       Preferred lab:       Send INR reminders to:   [facility] [employer] [place]    Comments:         Anticoagulation Care Providers     Provider Role Specialty Phone number    Marcie Robbins MD Referring Cardiology [phone]          Clinic Interview:      INR History:  Anticoagulation Monitoring 12/10/2018 2018 2018   INR 3.5 4.4 2.3   INR Date 12/10/2018 2018 2018   INR Goal 2.0-3.0 2.0-3.0 2.0-3.0   Trend Down Down Same   Last Week Total 75 mg 67.5 mg 37 mg   Next Week Total 62.5 mg 30 mg 40.5 mg   Sun - 6 mg -   Mon 7.5 mg - 6 mg   Tue 10 mg - 4.5 mg ()   Wed 7.5 mg - 6 mg   Thu - Hold () -   Fri - Hold () -   Sat - 6 mg -   Visit Report - - -   Some recent data might be hidden       Plan:  1. INR is Therapeutic today- see above in Anticoagulation Summary.  Will instruct Agnieszka Rizzo to Change their warfarin regimen- see above in Anticoagulation Summary.  2. Follow up in 3 days  3. Patient declines warfarin refills.  4. Verbal and written information provided. Patient expresses understanding and has no further questions at this time.    Lina Morris [facility]

## 2018-12-18 ENCOUNTER — TELEPHONE (OUTPATIENT)
Dept: INFECTIOUS DISEASES | Facility: CLINIC | Age: 83
End: 2018-12-18

## 2018-12-18 NOTE — TELEPHONE ENCOUNTER
----- Message from Hayden Landry MD sent at 12/14/2018 12:45 PM EST -----  Please let patient know that her sputum culture is so far negative for MAC.

## 2018-12-20 ENCOUNTER — HOSPITAL ENCOUNTER (OUTPATIENT)
Dept: CARDIOLOGY | Facility: HOSPITAL | Age: 83
Discharge: HOME | End: 2018-12-20
Attending: INTERNAL MEDICINE | Admitting: INTERNAL MEDICINE

## 2018-12-20 ENCOUNTER — TELEPHONE (OUTPATIENT)
Dept: CARDIOLOGY | Facility: CLINIC | Age: 83
End: 2018-12-20

## 2018-12-20 ENCOUNTER — ANTICOAGULATION VISIT (OUTPATIENT)
Dept: PHARMACY | Facility: HOSPITAL | Age: 83
End: 2018-12-20

## 2018-12-20 VITALS — WEIGHT: 125 LBS | BODY MASS INDEX: 23 KG/M2 | HEIGHT: 62 IN

## 2018-12-20 DIAGNOSIS — I50.32 CHRONIC DIASTOLIC CONGESTIVE HEART FAILURE (HCC): ICD-10-CM

## 2018-12-20 DIAGNOSIS — I48.19 PERSISTENT ATRIAL FIBRILLATION (HCC): ICD-10-CM

## 2018-12-20 DIAGNOSIS — I48.91 ATRIAL FIBRILLATION WITH RAPID VENTRICULAR RESPONSE (HCC): ICD-10-CM

## 2018-12-20 DIAGNOSIS — I25.10 CHRONIC CORONARY ARTERY DISEASE: ICD-10-CM

## 2018-12-20 DIAGNOSIS — R07.2 PRECORDIAL PAIN: ICD-10-CM

## 2018-12-20 LAB
BH CV NUCLEAR PRIOR STUDY: 2
BH CV STRESS BP STAGE 1: NORMAL
BH CV STRESS COMMENTS STAGE 1: NORMAL
BH CV STRESS DOSE REGADENOSON STAGE 1: 0.4
BH CV STRESS DURATION MIN STAGE 1: 0
BH CV STRESS DURATION SEC STAGE 1: 10
BH CV STRESS HR STAGE 1: 88
BH CV STRESS PROTOCOL 1: NORMAL
BH CV STRESS RECOVERY BP: NORMAL MMHG
BH CV STRESS RECOVERY HR: 80 BPM
BH CV STRESS STAGE 1: 1
INR PPP: 1.7 (ref 0.91–1.09)
LV EF NUC BP: 68 %
MAXIMAL PREDICTED HEART RATE: 132 BPM
PERCENT MAX PREDICTED HR: 66.67 %
PROTHROMBIN TIME: 19.9 SECONDS (ref 10–13.8)
STRESS BASELINE BP: NORMAL MMHG
STRESS BASELINE HR: 63 BPM
STRESS PERCENT HR: 78 %
STRESS POST EXERCISE DUR SEC: 10 SEC
STRESS POST PEAK BP: NORMAL MMHG
STRESS POST PEAK HR: 88 BPM
STRESS TARGET HR: 112 BPM

## 2018-12-20 PROCEDURE — 93016 CV STRESS TEST SUPVJ ONLY: CPT | Performed by: INTERNAL MEDICINE

## 2018-12-20 PROCEDURE — 85610 PROTHROMBIN TIME: CPT

## 2018-12-20 PROCEDURE — 0 TECHNETIUM TETROFOSMIN KIT: Performed by: INTERNAL MEDICINE

## 2018-12-20 PROCEDURE — G0463 HOSPITAL OUTPT CLINIC VISIT: HCPCS

## 2018-12-20 PROCEDURE — 78452 HT MUSCLE IMAGE SPECT MULT: CPT

## 2018-12-20 PROCEDURE — 25010000002 REGADENOSON 0.4 MG/5ML SOLUTION: Performed by: INTERNAL MEDICINE

## 2018-12-20 PROCEDURE — 36416 COLLJ CAPILLARY BLOOD SPEC: CPT

## 2018-12-20 PROCEDURE — A9502 TC99M TETROFOSMIN: HCPCS | Performed by: INTERNAL MEDICINE

## 2018-12-20 PROCEDURE — 93017 CV STRESS TEST TRACING ONLY: CPT

## 2018-12-20 PROCEDURE — 78452 HT MUSCLE IMAGE SPECT MULT: CPT | Performed by: INTERNAL MEDICINE

## 2018-12-20 PROCEDURE — 93018 CV STRESS TEST I&R ONLY: CPT | Performed by: INTERNAL MEDICINE

## 2018-12-20 RX ADMIN — REGADENOSON 0.4 MG: 0.08 INJECTION, SOLUTION INTRAVENOUS at 08:00

## 2018-12-20 RX ADMIN — TETROFOSMIN 1 DOSE: 1.38 INJECTION, POWDER, LYOPHILIZED, FOR SOLUTION INTRAVENOUS at 07:10

## 2018-12-20 RX ADMIN — TETROFOSMIN 1 DOSE: 1.38 INJECTION, POWDER, LYOPHILIZED, FOR SOLUTION INTRAVENOUS at 08:00

## 2018-12-20 NOTE — PROGRESS NOTES
Anticoagulation Clinic Progress Note    Anticoagulation Summary  As of 2018    INR goal:   2.0-3.0   TTR:   83.1 % (3.2 mo)   INR used for dosin.7! (2018)   Warfarin maintenance plan:   6 mg every day   Weekly warfarin total:   42 mg   Plan last modified:   Danial Louie RPH (2018)   Next INR check:   2018   Priority:   Critical   Target end date:       Indications    Persistent atrial fibrillation (CMS/HCC) [I48.1]             Anticoagulation Episode Summary     INR check location:       Preferred lab:       Send INR reminders to:   [facility] CLIN[facility] [place]    Comments:         Anticoagulation Care Providers     Provider Role Specialty Phone number    Marcie Robbins MD Referring Cardiology [phone]          Clinic Interview:  Patient Findings     Negatives:   Signs/symptoms of thrombosis, Signs/symptoms of bleeding,   Laboratory test error suspected, Change in health, Change in alcohol use,   Change in activity, Upcoming invasive procedure, Emergency department   visit, Upcoming dental procedure, Missed doses, Extra doses, Change in   medications, Change in diet/appetite, Hospital admission, Bruising, Other   complaints      Clinical Outcomes     Negatives:   Major bleeding event, Thromboembolic event,   Anticoagulation-related hospital admission, Anticoagulation-related ED   visit, Anticoagulation-related fatality        INR History:  Anticoagulation Monitoring 2018   INR 4.4 2.3 1.7   INR Date 2018   INR Goal 2.0-3.0 2.0-3.0 2.0-3.0   Trend Down Same Same   Last Week Total 67.5 mg 37 mg 28.5 mg   Next Week Total 30 mg 40.5 mg 45 mg   Sun 6 mg - 6 mg   Mon - 6 mg 6 mg   Tue - 4.5 mg () 6 mg   Wed - 6 mg -   Thu Hold () - 9 mg ()   Fri Hold () - 6 mg   Sat 6 mg - 6 mg   Visit Report - - -   Some recent data might be hidden       Plan:  1. INR is Subtherapeutic today- see above in Anticoagulation  Summary.  Will instruct Agnieszka Rizzo to Continue their warfarin regimen- see above in Anticoagulation Summary. Take booster of 9mg today, then continue 6mg daily.  2. Follow up in 6 days.  3. Patient declines warfarin refills.  4. Verbal and written information provided. Patient expresses understanding and has no further questions at this time.    Lina Morris [facility]

## 2018-12-20 NOTE — TELEPHONE ENCOUNTER
I called and spoke with her.  Stress test normal.  She has a follow-up appointment with GI in January

## 2018-12-26 ENCOUNTER — ANTICOAGULATION VISIT (OUTPATIENT)
Dept: PHARMACY | Facility: HOSPITAL | Age: 83
End: 2018-12-26

## 2018-12-26 ENCOUNTER — APPOINTMENT (OUTPATIENT)
Dept: PHARMACY | Facility: HOSPITAL | Age: 83
End: 2018-12-26

## 2018-12-26 DIAGNOSIS — I48.19 PERSISTENT ATRIAL FIBRILLATION (HCC): ICD-10-CM

## 2018-12-26 LAB
INR PPP: 4.2 (ref 0.91–1.09)
PROTHROMBIN TIME: 50.6 SECONDS (ref 10–13.8)

## 2018-12-26 PROCEDURE — 85610 PROTHROMBIN TIME: CPT

## 2018-12-26 PROCEDURE — G0463 HOSPITAL OUTPT CLINIC VISIT: HCPCS

## 2018-12-26 PROCEDURE — 36416 COLLJ CAPILLARY BLOOD SPEC: CPT

## 2018-12-26 NOTE — PROGRESS NOTES
Anticoagulation Clinic Progress Note    Anticoagulation Summary  As of 2018    INR goal:   2.0-3.0   TTR:   80.6 % (3.4 mo)   INR used for dosin.2! (2018)   Warfarin maintenance plan:   6 mg on Mon, Wed, Fri; 4.5 mg all other days   Weekly warfarin total:   36 mg   Plan last modified:   Danial Louie RPH (2018)   Next INR check:   2019   Priority:   Critical   Target end date:       Indications    Persistent atrial fibrillation (CMS/HCC) [I48.1]             Anticoagulation Episode Summary     INR check location:       Preferred lab:       Send INR reminders to:    [facility]    Comments:         Anticoagulation Care Providers     Provider Role Specialty Phone number    Marcie Robbins MD Referring Cardiology [phone]          Clinic Interview:  Patient Findings     Positives:   Change in alcohol use    Negatives:   Signs/symptoms of thrombosis, Signs/symptoms of bleeding,   Laboratory test error suspected, Change in health, Change in activity,   Upcoming invasive procedure, Emergency department visit, Upcoming dental   procedure, Missed doses, Extra doses, Change in medications, Change in   diet/appetite, Hospital admission, Bruising, Other complaints    Comments:   Pt admits to having some wine last night (may increase INR)      Clinical Outcomes     Negatives:   Major bleeding event, Thromboembolic event,   Anticoagulation-related hospital admission, Anticoagulation-related ED   visit, Anticoagulation-related fatality    Comments:   Pt admits to having some wine last night (may increase INR)        INR History:  Anticoagulation Monitoring 2018   INR 2.3 1.7 4.2   INR Date 2018   INR Goal 2.0-3.0 2.0-3.0 2.0-3.0   Trend Same Same Down   Last Week Total 37 mg 28.5 mg 45 mg   Next Week Total 40.5 mg 45 mg 30 mg   Sun - 6 mg 4.5 mg   Mon 6 mg 6 mg 6 mg   Tue 4.5 mg () 6 mg 4.5 mg   Wed 6 mg - Hold  (12/26)   Thu - 9 mg (12/20) 4.5 mg   Fri - 6 mg 6 mg   Sat - 6 mg 4.5 mg   Visit Report - - -   Some recent data might be hidden       Plan:  1. INR is Supratherapeutic today- see above in Anticoagulation Summary.  Will instruct Agnieszka Rizzo to Change their warfarin regimen- see above in Anticoagulation Summary.  2. Follow up in 1 week  3. Patient declines warfarin refills.  4. Verbal and written information provided. Patient expresses understanding and has no further questions at this time.    Danial Louie, [facility]

## 2019-01-02 ENCOUNTER — ANTICOAGULATION VISIT (OUTPATIENT)
Dept: PHARMACY | Facility: HOSPITAL | Age: 84
End: 2019-01-02

## 2019-01-02 ENCOUNTER — OFFICE VISIT (OUTPATIENT)
Dept: GASTROENTEROLOGY | Facility: CLINIC | Age: 84
End: 2019-01-02

## 2019-01-02 VITALS
TEMPERATURE: 98 F | SYSTOLIC BLOOD PRESSURE: 126 MMHG | BODY MASS INDEX: 22.43 KG/M2 | WEIGHT: 126.6 LBS | DIASTOLIC BLOOD PRESSURE: 70 MMHG | HEIGHT: 63 IN

## 2019-01-02 DIAGNOSIS — I48.19 PERSISTENT ATRIAL FIBRILLATION (HCC): ICD-10-CM

## 2019-01-02 DIAGNOSIS — R14.0 ABDOMINAL BLOATING: Primary | ICD-10-CM

## 2019-01-02 LAB
INR PPP: 2.4 (ref 0.91–1.09)
PROTHROMBIN TIME: 28.8 SECONDS (ref 10–13.8)

## 2019-01-02 PROCEDURE — 99204 OFFICE O/P NEW MOD 45 MIN: CPT | Performed by: INTERNAL MEDICINE

## 2019-01-02 PROCEDURE — 36416 COLLJ CAPILLARY BLOOD SPEC: CPT

## 2019-01-02 PROCEDURE — 85610 PROTHROMBIN TIME: CPT

## 2019-01-02 NOTE — PROGRESS NOTES
Anticoagulation Clinic Progress Note    Anticoagulation Summary  As of 2019    INR goal:   2.0-3.0   TTR:   77.6 % (3.7 mo)   INR used for dosin.4 (2019)   Warfarin maintenance plan:   6 mg on Mon, Wed, Fri; 4.5 mg all other days   Weekly warfarin total:   36 mg   No change documented:   Reema Dorantes   Plan last modified:   Danial Louie RPH (2018)   Next INR check:   2019   Priority:   Critical   Target end date:       Indications    Persistent atrial fibrillation (CMS/HCC) [I48.1]             Anticoagulation Episode Summary     INR check location:       Preferred lab:       Send INR reminders to:    [facility]    Comments:         Anticoagulation Care Providers     Provider Role Specialty Phone number    Marcie Robbins MD Referring Cardiology [phone]          Clinic Interview:  Patient Findings     Positives:   Other complaints        INR History:  Anticoagulation Monitoring 2018   INR 1.7 4.2 2.4   INR Date 2018   INR Goal 2.0-3.0 2.0-3.0 2.0-3.0   Trend Same Down Same   Last Week Total 28.5 mg 45 mg 30 mg   Next Week Total 45 mg 30 mg 36 mg   Sun 6 mg 4.5 mg 4.5 mg   Mon 6 mg 6 mg -   Tue 6 mg 4.5 mg -   Wed - Hold () 6 mg   Thu 9 mg () 4.5 mg 4.5 mg   Fri 6 mg 6 mg 6 mg   Sat 6 mg 4.5 mg 4.5 mg   Visit Report - - -   Some recent data might be hidden       Plan:  1. INR is therapeutic today- see above in Anticoagulation Summary.   Will instruct Agnieszka Rizzo to continue their warfarin regimen- see above in Anticoagulation Summary.  2. Follow up in 1 weeks.  3. Patient declines warfarin refills.  4. Verbal and written information provided. Patient expresses understanding and has no further questions at this time.    Reema Dorantes

## 2019-01-02 NOTE — PROGRESS NOTES
Chief Complaint   Patient presents with   • Bloated     Subjective   HPI  Agnieszka Rizzo is a 88 y.o. female with multiple medical problems who presents for evaluation of abdominal bloating.      Abdominal bloating for last 6-12 mos.  Feels abdomen becomes more distended as day goes on.    No constipation or diarrhea. No clear dietary triggers.  She does try to limit sodium due to issues with HTN.      Hx of C diff colitis in 2017 (secondary to abx for recurrent PNA).    Colonoscopy 2013 which pt remembers as being unremarkable  She more remote hx of PUD.  Denies any overt abdominal pain. No heartburn or dysphagia.      Past Medical History:   Diagnosis Date   • Aspergillus (CMS/HCC)    • Asthma    • Atrial fibrillation (CMS/HCC)     chronic   • Atrial flutter (CMS/HCC)    • Bronchiectasis (CMS/HCC)    • C. difficile diarrhea 3/11/2017   • CAD (coronary artery disease)     nonobstructive   • Chronic diastolic CHF (congestive heart failure) (CMS/HCC)    • Colitis    • Cough    • Cryoglobulinemia (CMS/HCC)    • Dyspnea on exertion    • Fall    • Hyperlipidemia    • Hypertension    • Hyponatremia    • Hypoxia    • Infectious viral hepatitis     AGE 13   • Left shoulder pain    • Leg swelling    • Lesion of lung    • Mild tricuspid regurgitation    • MR (mitral regurgitation)     mild   • MVP (mitral valve prolapse)    • Permanent atrial fibrillation (CMS/HCC)    • Pneumonia with the fungal infection aspergillosis (CMS/HCC) 1/6/2017   • Pneumothorax    • SOB (shortness of breath)    • UTI (urinary tract infection)    • Wheeze     mild       Current Outpatient Medications:   •  atorvastatin (LIPITOR) 40 MG tablet, TAKE 1 TABLET EVERY DAY, Disp: 90 tablet, Rfl: 3  •  calcium carbonate (OS-EVIN) 600 MG tablet, Take 600 mg by mouth Daily., Disp: , Rfl:   •  cetirizine (zyrTEC) 10 MG tablet, Take 10 mg by mouth Daily., Disp: , Rfl:   •  ezetimibe (ZETIA) 10 MG tablet, TAKE 1 TABLET EVERY DAY, Disp: 90 tablet, Rfl: 3  •   fluticasone (FLONASE) 50 MCG/ACT nasal spray, 2 sprays into each nostril Daily., Disp: , Rfl:   •  fluticasone-salmeterol (ADVAIR HFA) 230-21 MCG/ACT inhaler, Inhale 2 puffs 2 (Two) Times a Day., Disp: , Rfl:   •  glucosamine-chondroitin 500-400 MG capsule capsule, Take 1 capsule by mouth Daily As Needed., Disp: , Rfl:   •  Lactobacillus (ACIDOPHILUS PO), Take 2 tablets by mouth Daily., Disp: , Rfl:   •  LITTLE NOSES SALINE NASAL MIST aerosol solution, 1 spray into each nostril 2 (Two) Times a Day., Disp: , Rfl:   •  potassium chloride (K-DUR,KLOR-CON) 10 MEQ CR tablet, TAKE 1 TABLET EVERY DAY, Disp: 90 tablet, Rfl: 3  •  torsemide (DEMADEX) 20 MG tablet, TAKE 1 TABLET TWICE DAILY, Disp: 180 tablet, Rfl: 3  •  verapamil PM (VERELAN PM) 360 MG 24 hr capsule, Take 1 capsule by mouth Every Night., Disp: 90 capsule, Rfl: 3  •  warfarin (COUMADIN) 3 MG tablet, TAKE 2 TABLETS BY MOUTH DAILY OR AS DIRECTED, Disp: 180 tablet, Rfl: 0     Allergies   Allergen Reactions   • Amlodipine Besylate Swelling   • Aspirin      Caused bleeding ulcers   • Bactrim [Sulfamethoxazole-Trimethoprim] Nausea And Vomiting   • Erythromycin    • Levaquin [Levofloxacin]    • Macrobid [Nitrofurantoin] Nausea Only   • Ramipril Other (See Comments)     Cough     Social History     Socioeconomic History   • Marital status:      Spouse name: Not on file   • Number of children: Not on file   • Years of education: Not on file   • Highest education level: Not on file   Social Needs   • Financial resource strain: Not on file   • Food insecurity - worry: Not on file   • Food insecurity - inability: Not on file   • Transportation needs - medical: Not on file   • Transportation needs - non-medical: Not on file   Occupational History   • Not on file   Tobacco Use   • Smoking status: Never Smoker   • Smokeless tobacco: Never Used   • Tobacco comment: caffiene daily   Substance and Sexual Activity   • Alcohol use: Yes     Comment: occasional   • Drug use:  No   • Sexual activity: Yes     Partners: Male   Other Topics Concern   • Not on file   Social History Narrative   • Not on file     Family History   Problem Relation Age of Onset   • Hypertension Mother    • Stroke Mother    • Hypertension Father    • Cancer Son    • Cancer Brother      Review of Systems   Gastrointestinal:        Bloating          Objective   Vitals:    01/02/19 1028   BP: 126/70   Temp: 98 °F (36.7 °C)     Physical Exam   Constitutional: She is oriented to person, place, and time. She appears well-developed and well-nourished.   HENT:   Head: Normocephalic and atraumatic.   Mouth/Throat: Oropharynx is clear and moist.   Eyes: EOM are normal. No scleral icterus.   Neck: Normal range of motion. Neck supple. No thyromegaly present.   Cardiovascular: Normal rate, regular rhythm and normal heart sounds. Exam reveals no gallop and no friction rub.   No murmur heard.  Pulmonary/Chest: Effort normal and breath sounds normal. She has no wheezes. She has no rales. She exhibits no tenderness.   Abdominal: Soft. Bowel sounds are normal. She exhibits no distension. There is no tenderness. There is no rebound and no guarding. No hernia.   Musculoskeletal: Normal range of motion. She exhibits no edema.   Lymphadenopathy:     She has no cervical adenopathy.   Neurological: She is alert and oriented to person, place, and time.   Skin: Skin is warm and dry.   Psychiatric: She has a normal mood and affect. Judgment and thought content normal.   Vitals reviewed.       Assessment/Plan   Assessment:     1. Abdominal bloating      Plan:   Check CT scan A/P with IV and oral contrast  Celiac panel  Low FODMAP diet    If above negative, would then move to SIBO breath testing          Jorge Wheeler M.D.  [facility], operated by [facility]  [address] [place] [address]  Office: [phone]

## 2019-01-03 LAB
ENDOMYSIUM IGA SER QL: NEGATIVE
GLIADIN PEPTIDE IGA SER-ACNC: 5 UNITS (ref 0–19)
GLIADIN PEPTIDE IGG SER-ACNC: 2 UNITS (ref 0–19)
IGA SERPL-MCNC: 284 MG/DL (ref 64–422)
TTG IGA SER-ACNC: <2 U/ML (ref 0–3)
TTG IGG SER-ACNC: <2 U/ML (ref 0–5)

## 2019-01-04 ENCOUNTER — HOSPITAL ENCOUNTER (OUTPATIENT)
Dept: CT IMAGING | Facility: HOSPITAL | Age: 84
Discharge: HOME | End: 2019-01-04
Attending: INTERNAL MEDICINE | Admitting: INTERNAL MEDICINE

## 2019-01-04 DIAGNOSIS — R14.0 ABDOMINAL BLOATING: ICD-10-CM

## 2019-01-04 PROCEDURE — 82565 ASSAY OF CREATININE: CPT

## 2019-01-04 PROCEDURE — 25010000002 IOPAMIDOL 61 % SOLUTION: Performed by: INTERNAL MEDICINE

## 2019-01-04 PROCEDURE — 74177 CT ABD & PELVIS W/CONTRAST: CPT

## 2019-01-04 PROCEDURE — 0 DIATRIZOATE MEGLUMINE & SODIUM PER 1 ML: Performed by: INTERNAL MEDICINE

## 2019-01-04 RX ADMIN — DIATRIZOATE MEGLUMINE AND DIATRIZOATE SODIUM 30 ML: 660; 100 LIQUID ORAL; RECTAL at 11:30

## 2019-01-04 RX ADMIN — IOPAMIDOL 85 ML: 612 INJECTION, SOLUTION INTRAVENOUS at 12:49

## 2019-01-05 LAB — CREAT BLDA-MCNC: 0.9 MG/DL (ref 0.6–1.3)

## 2019-01-07 ENCOUNTER — ANTICOAGULATION VISIT (OUTPATIENT)
Dept: PHARMACY | Facility: HOSPITAL | Age: 84
End: 2019-01-07

## 2019-01-07 DIAGNOSIS — I48.19 PERSISTENT ATRIAL FIBRILLATION (HCC): ICD-10-CM

## 2019-01-07 LAB
INR PPP: 2 (ref 0.91–1.09)
PROTHROMBIN TIME: 24.1 SECONDS (ref 10–13.8)

## 2019-01-07 PROCEDURE — G0463 HOSPITAL OUTPT CLINIC VISIT: HCPCS

## 2019-01-07 PROCEDURE — 85610 PROTHROMBIN TIME: CPT

## 2019-01-07 PROCEDURE — 36416 COLLJ CAPILLARY BLOOD SPEC: CPT

## 2019-01-10 ENCOUNTER — TELEPHONE (OUTPATIENT)
Dept: GASTROENTEROLOGY | Facility: CLINIC | Age: 84
End: 2019-01-10

## 2019-01-10 NOTE — TELEPHONE ENCOUNTER
----- Message from Jake Mast sent at 1/10/2019 12:20 PM EST -----  Regarding: Results  Contact: [phone]  Please call to discuss results.

## 2019-01-10 NOTE — TELEPHONE ENCOUNTER
Celiac negative  CT showed the following  -benign liver cysts  -kidney cyst  -gallstone    No concerning findings on CT  Recommend SIBO breath test as next step

## 2019-01-11 ENCOUNTER — TELEPHONE (OUTPATIENT)
Dept: INFECTIOUS DISEASES | Facility: CLINIC | Age: 84
End: 2019-01-11

## 2019-01-11 LAB
MYCOBACTERIUM SPEC CULT: NORMAL
NIGHT BLUE STAIN TISS: NORMAL

## 2019-01-11 NOTE — TELEPHONE ENCOUNTER
----- Message from Hayden Landry MD sent at 1/11/2019 12:24 PM EST -----  Please let patient know that her sputum sample is negative for MAC!

## 2019-01-11 NOTE — TELEPHONE ENCOUNTER
Pt returned call per a staff message.    Called pt back and advised of the note from Dr Wheeler. Pt verb understanding and is agreeable to the plan. She will  the breath test kit sometime next week.

## 2019-01-14 ENCOUNTER — ANTICOAGULATION VISIT (OUTPATIENT)
Dept: PHARMACY | Facility: HOSPITAL | Age: 84
End: 2019-01-14

## 2019-01-14 DIAGNOSIS — I48.19 PERSISTENT ATRIAL FIBRILLATION (HCC): ICD-10-CM

## 2019-01-14 LAB
INR PPP: 2.6 (ref 0.91–1.09)
PROTHROMBIN TIME: 31 SECONDS (ref 10–13.8)

## 2019-01-14 PROCEDURE — 85610 PROTHROMBIN TIME: CPT

## 2019-01-14 PROCEDURE — 36416 COLLJ CAPILLARY BLOOD SPEC: CPT

## 2019-01-14 NOTE — PROGRESS NOTES
Anticoagulation Clinic Progress Note    Anticoagulation Summary  As of 2019    INR goal:   2.0-3.0   TTR:   79.8 % (4.1 mo)   INR used for dosin.6 (2019)   Warfarin maintenance plan:   4.5 mg on Tue, Thu, Sat; 6 mg all other days   Weekly warfarin total:   37.5 mg   No change documented:   Reeam Dorantes   Plan last modified:   Danial Louie RP (2019)   Next INR check:   2019   Priority:   Critical   Target end date:       Indications    Persistent atrial fibrillation (CMS/HCC) [I48.1]             Anticoagulation Episode Summary     INR check location:       Preferred lab:       Send INR reminders to:    [facility]    Comments:         Anticoagulation Care Providers     Provider Role Specialty Phone number    Marcie Robbins MD Referring Cardiology [phone]          Clinic Interview:  Patient Findings     Positives:   Signs/symptoms of thrombosis, Change in medications    Negatives:   Signs/symptoms of bleeding, Laboratory test error suspected,   Change in health, Change in alcohol use, Change in activity, Upcoming   invasive procedure, Emergency department visit, Upcoming dental procedure,   Missed doses, Extra doses, Change in diet/appetite, Hospital admission,   Bruising, Other complaints    Comments:   : doxy caps 100 bid for 7 days      Clinical Outcomes     Negatives:   Major bleeding event, Thromboembolic event,   Anticoagulation-related hospital admission, Anticoagulation-related ED   visit, Anticoagulation-related fatality    Comments:   : doxy caps 100 bid for 7 days        INR History:  Anticoagulation Monitoring 2019   INR 2.4 2.0 2.6   INR Date 2019   INR Goal 2.0-3.0 2.0-3.0 2.0-3.0   Trend Same Up Same   Last Week Total 30 mg 36 mg 37.5 mg   Next Week Total 36 mg 37.5 mg 37.5 mg   Sun 4.5 mg 6 mg 6 mg   Mon - 6 mg 6 mg   Tue - 4.5 mg 4.5 mg   Wed 6 mg 6 mg 6 mg   Thu 4.5 mg 4.5 mg 4.5 mg    Fri 6 mg 6 mg 6 mg   Sat 4.5 mg 4.5 mg 4.5 mg   Visit Report - - -   Some recent data might be hidden       Plan:  1. INR is therapeutic today- see above in Anticoagulation Summary.   Will instruct Agnieszka Rizzo to continue their warfarin regimen- see above in Anticoagulation Summary.  2. Follow up in 1 weeks.  3. Patient declines warfarin refills.  4. Verbal and written information provided. Patient expresses understanding and has no further questions at this time.    Reema Dorantes

## 2019-01-19 NOTE — PLAN OF CARE
Problem: Patient Care Overview (Adult)  Goal: Plan of Care Review  Outcome: Ongoing (interventions implemented as appropriate)    01/07/17 0301   Coping/Psychosocial Response Interventions   Plan Of Care Reviewed With patient   Patient Care Overview   Progress no change   Outcome Evaluation   Outcome Summary/Follow up Plan Pt is a new admit.Pt is admitted for acute respiratory failure with hypoxia.Pt is on 2 L per n/c.No pain or discomfort voiced.No resp distress noted.Will continue to monitor.       Goal: Adult Individualization and Mutuality  Outcome: Ongoing (interventions implemented as appropriate)    Problem: Infection, Risk/Actual (Adult)  Goal: Identify Related Risk Factors and Signs and Symptoms  Outcome: Ongoing (interventions implemented as appropriate)  Goal: Infection Prevention/Resolution  Outcome: Ongoing (interventions implemented as appropriate)    Problem: Respiratory Insufficiency (Adult)  Goal: Identify Related Risk Factors and Signs and Symptoms  Outcome: Ongoing (interventions implemented as appropriate)  Goal: Acid/Base Balance  Outcome: Ongoing (interventions implemented as appropriate)  Goal: Effective Ventilation  Outcome: Ongoing (interventions implemented as appropriate)      
Problem: Patient Care Overview (Adult)  Goal: Plan of Care Review  Outcome: Ongoing (interventions implemented as appropriate)    01/07/17 1802   Coping/Psychosocial Response Interventions   Plan Of Care Reviewed With patient   Patient Care Overview   Progress no change   Outcome Evaluation   Outcome Summary/Follow up Plan VSS, pt on 2L NC. No c/o pain. No falls. No respiratory distress noted. Will continue to monitor.           
Problem: Patient Care Overview (Adult)  Goal: Plan of Care Review  Outcome: Ongoing (interventions implemented as appropriate)    01/08/17 0449   Coping/Psychosocial Response Interventions   Plan Of Care Reviewed With patient   Patient Care Overview   Progress no change   Outcome Evaluation   Outcome Summary/Follow up Plan Pt easily get short of breath with exertion.Pt on 3 L per n/c.Will continue to monitor.       Goal: Adult Individualization and Mutuality  Outcome: Ongoing (interventions implemented as appropriate)    Problem: Infection, Risk/Actual (Adult)  Goal: Identify Related Risk Factors and Signs and Symptoms  Outcome: Ongoing (interventions implemented as appropriate)  Goal: Infection Prevention/Resolution  Outcome: Ongoing (interventions implemented as appropriate)    Problem: Respiratory Insufficiency (Adult)  Goal: Identify Related Risk Factors and Signs and Symptoms  Outcome: Ongoing (interventions implemented as appropriate)  Goal: Acid/Base Balance  Outcome: Ongoing (interventions implemented as appropriate)  Goal: Effective Ventilation  Outcome: Ongoing (interventions implemented as appropriate)      
Problem: Patient Care Overview (Adult)  Goal: Plan of Care Review  Outcome: Ongoing (interventions implemented as appropriate)    01/09/17 0523   Coping/Psychosocial Response Interventions   Plan Of Care Reviewed With patient   Patient Care Overview   Progress improving   Outcome Evaluation   Outcome Summary/Follow up Plan PT QUALIFIED FOR HOME OXYGEN AND POSSIBLE D/C IN AM         Problem: Infection, Risk/Actual (Adult)  Goal: Identify Related Risk Factors and Signs and Symptoms  Outcome: Outcome(s) achieved Date Met:  01/09/17  Goal: Infection Prevention/Resolution  Outcome: Ongoing (interventions implemented as appropriate)    Problem: Respiratory Insufficiency (Adult)  Goal: Identify Related Risk Factors and Signs and Symptoms  Outcome: Outcome(s) achieved Date Met:  01/09/17  Goal: Acid/Base Balance  Outcome: Ongoing (interventions implemented as appropriate)  Goal: Effective Ventilation  Outcome: Ongoing (interventions implemented as appropriate)      
Problem: Patient Care Overview (Adult)  Goal: Plan of Care Review  Outcome: Ongoing (interventions implemented as appropriate)    01/09/17 1602   Coping/Psychosocial Response Interventions   Plan Of Care Reviewed With patient;daughter   Patient Care Overview   Progress improving   Outcome Evaluation   Outcome Summary/Follow up Plan VSS A&Ox4 d/c soon       Goal: Discharge Needs Assessment  Outcome: Ongoing (interventions implemented as appropriate)    Problem: Infection, Risk/Actual (Adult)  Goal: Infection Prevention/Resolution  Outcome: Ongoing (interventions implemented as appropriate)    Problem: Respiratory Insufficiency (Adult)  Goal: Acid/Base Balance  Outcome: Ongoing (interventions implemented as appropriate)  Goal: Effective Ventilation  Outcome: Ongoing (interventions implemented as appropriate)      
Problem: Respiratory Insufficiency (Adult)  Goal: Identify Related Risk Factors and Signs and Symptoms  Outcome: Ongoing (interventions implemented as appropriate)    01/09/17 0735   Respiratory Insufficiency   Related Risk Factors (Respiratory Insufficiency) sleep disturbance   Signs and Symptoms (Respiratory Insufficiency) sleeplessness/fatigue;shortness of breath;decreased oxygen saturation       Goal: Acid/Base Balance  Outcome: Ongoing (interventions implemented as appropriate)  Goal: Effective Ventilation  Outcome: Ongoing (interventions implemented as appropriate)      
Problem: Respiratory Insufficiency (Adult)  Goal: Identify Related Risk Factors and Signs and Symptoms  Outcome: Ongoing (interventions implemented as appropriate)  Goal: Acid/Base Balance  Outcome: Ongoing (interventions implemented as appropriate)  Goal: Effective Ventilation  Outcome: Ongoing (interventions implemented as appropriate)      
awake/alert

## 2019-01-21 ENCOUNTER — APPOINTMENT (OUTPATIENT)
Dept: LAB | Facility: HOSPITAL | Age: 84
End: 2019-01-21

## 2019-01-21 ENCOUNTER — ANTICOAGULATION VISIT (OUTPATIENT)
Dept: PHARMACY | Facility: HOSPITAL | Age: 84
End: 2019-01-21

## 2019-01-21 DIAGNOSIS — I48.19 PERSISTENT ATRIAL FIBRILLATION (HCC): ICD-10-CM

## 2019-01-21 LAB
INR PPP: 4.59 (ref 0.9–1.1)
INR PPP: 5.1 (ref 0.91–1.09)
PROTHROMBIN TIME: 42.7 SECONDS (ref 11.7–14.2)
PROTHROMBIN TIME: 61.6 SECONDS (ref 10–13.8)

## 2019-01-21 PROCEDURE — G0463 HOSPITAL OUTPT CLINIC VISIT: HCPCS

## 2019-01-21 PROCEDURE — 36415 COLL VENOUS BLD VENIPUNCTURE: CPT

## 2019-01-21 PROCEDURE — 85610 PROTHROMBIN TIME: CPT

## 2019-01-21 PROCEDURE — 36416 COLLJ CAPILLARY BLOOD SPEC: CPT

## 2019-01-21 RX ORDER — WARFARIN SODIUM 3 MG/1
4.5 TABLET ORAL DAILY
Qty: 135 TABLET | Refills: 0 | Status: SHIPPED | OUTPATIENT
Start: 2019-01-21 | End: 2019-04-23 | Stop reason: SDUPTHER

## 2019-01-21 NOTE — PROGRESS NOTES
Anticoagulation Clinic Progress Note    Anticoagulation Summary  As of 2019    INR goal:   2.0-3.0   TTR:   76.4 % (4.3 mo)   INR used for dosin.59! (2019)   Warfarin maintenance plan:   4.5 mg every day   Weekly warfarin total:   31.5 mg   Plan last modified:   Lina Morris RPH (2019)   Next INR check:   2019   Priority:   Critical   Target end date:       Indications    Persistent atrial fibrillation (CMS/HCC) [I48.1]             Anticoagulation Episode Summary     INR check location:       Preferred lab:       Send INR reminders to:   [facility] [place]    Comments:         Anticoagulation Care Providers     Provider Role Specialty Phone number    Marcie Robbins MD Referring Cardiology [phone]          Clinic Interview:      INR History:  Anticoagulation Monitoring 2019   INR 2.0 2.6 4.59   INR Date 2019   INR Goal 2.0-3.0 2.0-3.0 2.0-3.0   Trend Up Same Down   Last Week Total 36 mg 37.5 mg 37.5 mg   Next Week Total 37.5 mg 37.5 mg 27 mg   Sun 6 mg 6 mg 4.5 mg   Mon 6 mg 6 mg Hold ()   Tue 4.5 mg 4.5 mg 4.5 mg   Wed 6 mg 6 mg 4.5 mg   Thu 4.5 mg 4.5 mg 4.5 mg   Fri 6 mg 6 mg 4.5 mg   Sat 4.5 mg 4.5 mg 4.5 mg   Visit Report - - -   Some recent data might be hidden       Plan:  1. INR is Supratherapeutic today- see above in Anticoagulation Summary.  Confirmed INR with lab: 4.59.  Hold today then restart at 4.5mg daily.  Will instruct Agnieszka Rizzo to Change their warfarin regimen- see above in Anticoagulation Summary.  2. Follow up in 1 week  3. Patient desires warfarin refills.  4. Verbal and written information provided. Patient expresses understanding and has no further questions at this time.    Lina Morris RPH

## 2019-01-23 ENCOUNTER — TELEPHONE (OUTPATIENT)
Dept: GASTROENTEROLOGY | Facility: CLINIC | Age: 84
End: 2019-01-23

## 2019-01-23 NOTE — TELEPHONE ENCOUNTER
----- Message from Jumana Jason sent at 1/23/2019 10:30 AM EST -----  Regarding: QUESTION  Contact: [phone]  PT ASK IF HER CT SCAN WOULD SHOW FLUID IN HER INTESTINE?

## 2019-01-23 NOTE — TELEPHONE ENCOUNTER
"Called pt back. Pt states she has not been able to start the breath test yet because it says not to complete if you have been on an abx within 4 weeks. She just got off of an abx so she has to wait to do the breath test. She was wondering if the CT scan would show extra fluid in her intestines? She had a CT scan a few years ago that showed extra fluid in her abdomen and she was put on water pills to help her get rid of that fluid. Advised I see the CT scan she is referring to, it was done in April 2017. Advised it showed \"ascites\" which is fluid buildup in the abdomen, but not within the intestines. Advised her most recent CT scan did not show any ascites within her abdomen. Pt verb understanding and will complete her breath test in two week at the appropriate time frame.   "

## 2019-01-28 ENCOUNTER — ANTICOAGULATION VISIT (OUTPATIENT)
Dept: PHARMACY | Facility: HOSPITAL | Age: 84
End: 2019-01-28

## 2019-01-28 DIAGNOSIS — I48.19 PERSISTENT ATRIAL FIBRILLATION (HCC): ICD-10-CM

## 2019-01-28 LAB
INR PPP: 3.2 (ref 0.91–1.09)
PROTHROMBIN TIME: 38 SECONDS (ref 10–13.8)

## 2019-01-28 PROCEDURE — 85610 PROTHROMBIN TIME: CPT

## 2019-01-28 PROCEDURE — 36416 COLLJ CAPILLARY BLOOD SPEC: CPT

## 2019-01-28 RX ORDER — SODIUM CHLORIDE FOR INHALATION 7 %
4 VIAL, NEBULIZER (ML) INHALATION 2 TIMES DAILY
Status: ON HOLD | COMMUNITY
End: 2019-12-18 | Stop reason: SDUPTHER

## 2019-01-28 RX ORDER — IPRATROPIUM BROMIDE AND ALBUTEROL SULFATE 2.5; .5 MG/3ML; MG/3ML
3 SOLUTION RESPIRATORY (INHALATION) 2 TIMES DAILY
Status: ON HOLD | COMMUNITY
Start: 2022-08-01

## 2019-01-28 NOTE — PROGRESS NOTES
Anticoagulation Clinic Progress Note    Anticoagulation Summary  As of 1/28/2019    INR goal:   2.0-3.0   TTR:   72.4 % (4.5 mo)   INR used for dosing:   3.2! (1/28/2019)   Warfarin maintenance plan:   3 mg on Mon; 4.5 mg all other days   Weekly warfarin total:   30 mg   Plan last modified:   Lina Morris RPH (1/28/2019)   Next INR check:   2/4/2019   Priority:   Critical   Target end date:       Indications    Persistent atrial fibrillation (CMS/HCC) [I48.1]             Anticoagulation Episode Summary     INR check location:       Preferred lab:       [facility]ers [facility]:    [facility] [place]    Comments:         Anticoagulation Care Providers     Provider Role Specialty Phone number    Marcie Robbins MD Referring Cardiology [phone]          Clinic Interview:      INR History:  Anticoagulation Monitoring 1/14/2019 1/21/2019 1/28/2019   INR 2.6 4.59 3.2   INR Date 1/14/2019 1/21/2019 1/28/2019   INR Goal 2.0-3.0 2.0-3.0 2.0-3.0   Trend Same Down Down   Last Week Total 37.5 mg 37.5 mg 27 mg   Next Week Total 37.5 mg 27 mg 30 mg   Sun 6 mg 4.5 mg 4.5 mg   Mon 6 mg Hold (1/21) 3 mg   Tue 4.5 mg 4.5 mg 4.5 mg   Wed 6 mg 4.5 mg 4.5 mg   Thu 4.5 mg 4.5 mg 4.5 mg   Fri 6 mg 4.5 mg 4.5 mg   Sat 4.5 mg 4.5 mg 4.5 mg   Visit Report - - -   Some recent data might be hidden       Plan:  1. INR is Supratherapeutic today- see above in Anticoagulation Summary.   Will instruct Agnieszka Rizzo to Change their warfarin regimen- see above in Anticoagulation Summary.  2. Follow up in 1 week  3. Patient declines warfarin refills.  4. Verbal and written information provided. Patient expresses understanding and has no further questions at this time.    Lina Morris RPH

## 2019-02-04 ENCOUNTER — ANTICOAGULATION VISIT (OUTPATIENT)
Dept: PHARMACY | Facility: HOSPITAL | Age: 84
End: 2019-02-04

## 2019-02-04 DIAGNOSIS — I48.19 PERSISTENT ATRIAL FIBRILLATION (HCC): ICD-10-CM

## 2019-02-04 LAB
INR PPP: 2.5 (ref 0.91–1.09)
PROTHROMBIN TIME: 29.8 SECONDS (ref 10–13.8)

## 2019-02-04 PROCEDURE — 36416 COLLJ CAPILLARY BLOOD SPEC: CPT

## 2019-02-04 PROCEDURE — 85610 PROTHROMBIN TIME: CPT

## 2019-02-04 NOTE — PROGRESS NOTES
Anticoagulation Clinic Progress Note    Anticoagulation Summary  As of 2019    INR goal:   2.0-3.0   TTR:   72.4 % (4.8 mo)   INR used for dosin.5 (2019)   Warfarin maintenance plan:   3 mg on Mon; 4.5 mg all other days   Weekly warfarin total:   30 mg   No change documented:   Reema Dorantes   Plan last modified:   Lina Morris RPH (2019)   Next INR check:   2/15/2019   Priority:   Critical   Target end date:       Indications    Persistent atrial fibrillation (CMS/HCC) [I48.1]             Anticoagulation Episode Summary     INR check location:       Preferred lab:       Send INR reminders to:    [facility]    Comments:         Anticoagulation Care Providers     Provider Role Specialty Phone number    Marcie Robbins MD Referring Cardiology [phone]          Clinic Interview:  Patient Findings     Negatives:   Signs/symptoms of thrombosis, Signs/symptoms of bleeding,   Laboratory test error suspected, Change in health, Change in alcohol use,   Change in activity, Upcoming invasive procedure, Emergency department   visit, Upcoming dental procedure, Missed doses, Extra doses, Change in   medications, Change in diet/appetite, Hospital admission, Bruising, Other   complaints      Clinical Outcomes     Negatives:   Major bleeding event, Thromboembolic event,   Anticoagulation-related hospital admission, Anticoagulation-related ED   visit, Anticoagulation-related fatality        INR History:  Anticoagulation Monitoring 2019   INR 4.59 3.2 2.5   INR Date 2019   INR Goal 2.0-3.0 2.0-3.0 2.0-3.0   Trend Down Down Same   Last Week Total 37.5 mg 27 mg 30 mg   Next Week Total 27 mg 30 mg 30 mg   Sun 4.5 mg 4.5 mg 4.5 mg   Mon Hold () 3 mg 3 mg   Tue 4.5 mg 4.5 mg 4.5 mg   Wed 4.5 mg 4.5 mg 4.5 mg   Thu 4.5 mg 4.5 mg 4.5 mg   Fri 4.5 mg 4.5 mg 4.5 mg   Sat 4.5 mg 4.5 mg 4.5 mg   Visit Report - - -   Some recent data might be hidden        Plan:  1. INR is therapeutic today- see above in Anticoagulation Summary.   Will instruct Agnieszka Rizzo to continue their warfarin regimen- see above in Anticoagulation Summary.  2. Follow up in 11 days.  3. Patient declines warfarin refills.  4. Verbal and written information provided. Patient expresses understanding and has no further questions at this time.    Reema Dorantes

## 2019-02-06 ENCOUNTER — TELEPHONE (OUTPATIENT)
Dept: GASTROENTEROLOGY | Facility: CLINIC | Age: 84
End: 2019-02-06

## 2019-02-06 NOTE — TELEPHONE ENCOUNTER
Returned patient's phone call. She questions if she can take her daily medications the day prior to her SIBO test. Advised patient to call the phone number listed on her paperwork to confirm if its ok. She verb understanding.

## 2019-02-06 NOTE — TELEPHONE ENCOUNTER
----- Message from Primo Shah sent at 2/6/2019  3:06 PM EST -----  Regarding: pt called   Contact: [phone]  Pt is asking about this breath test, can she take her medication the day before ?

## 2019-02-15 ENCOUNTER — ANTICOAGULATION VISIT (OUTPATIENT)
Dept: PHARMACY | Facility: HOSPITAL | Age: 84
End: 2019-02-15

## 2019-02-15 DIAGNOSIS — I48.19 PERSISTENT ATRIAL FIBRILLATION (HCC): ICD-10-CM

## 2019-02-15 LAB
INR PPP: 2.5 (ref 0.91–1.09)
PROTHROMBIN TIME: 30.2 SECONDS (ref 10–13.8)

## 2019-02-15 PROCEDURE — 36416 COLLJ CAPILLARY BLOOD SPEC: CPT

## 2019-02-15 PROCEDURE — 85610 PROTHROMBIN TIME: CPT

## 2019-02-15 NOTE — PROGRESS NOTES
Anticoagulation Clinic Progress Note    Anticoagulation Summary  As of 2/15/2019    INR goal:   2.0-3.0   TTR:   74.4 % (5.1 mo)   INR used for dosin.5 (2/15/2019)   Warfarin maintenance plan:   3 mg on Mon; 4.5 mg all other days   Weekly warfarin total:   30 mg   No change documented:   Sara Santana   Plan last modified:   Lina Morris RPH (2019)   Next INR check:   3/11/2019   Priority:   Maintenance   Target end date:       Indications    Persistent atrial fibrillation (CMS/HCC) [I48.1]             Anticoagulation Episode Summary     INR check location:       Preferred lab:       Send INR reminders to:   [facility]    Comments:         Anticoagulation Care Providers     Provider Role Specialty Phone number    Marcie Robbins MD Referring Cardiology [phone]          Clinic Interview:      INR History:  Anticoagulation Monitoring 2019 2019 2/15/2019   INR 3.2 2.5 2.5   INR Date 2019 2019 2/15/2019   INR Goal 2.0-3.0 2.0-3.0 2.0-3.0   Trend Down Same Same   Last Week Total 27 mg 30 mg 30 mg   Next Week Total 30 mg 30 mg 30 mg   Sun 4.5 mg 4.5 mg 4.5 mg   Mon 3 mg 3 mg 3 mg   Tue 4.5 mg 4.5 mg 4.5 mg   Wed 4.5 mg 4.5 mg 4.5 mg   Thu 4.5 mg 4.5 mg 4.5 mg   Fri 4.5 mg 4.5 mg 4.5 mg   Sat 4.5 mg 4.5 mg 4.5 mg   Visit Report - - -   Some recent data might be hidden       Plan:  1. INR is therapeutic today- see above in Anticoagulation Summary.   Will instruct Agnieszka Rizzo to continue their warfarin regimen- see above in Anticoagulation Summary.  2. Follow up in 3 weeks.  3. Patient declines warfarin refills.  4. Verbal and written information provided. Patient expresses understanding and has no further questions at this time.    Sara Santana

## 2019-02-28 ENCOUNTER — TELEPHONE (OUTPATIENT)
Dept: GASTROENTEROLOGY | Facility: CLINIC | Age: 84
End: 2019-02-28

## 2019-03-11 ENCOUNTER — ANTICOAGULATION VISIT (OUTPATIENT)
Dept: PHARMACY | Facility: HOSPITAL | Age: 84
End: 2019-03-11

## 2019-03-11 DIAGNOSIS — I48.19 PERSISTENT ATRIAL FIBRILLATION (HCC): ICD-10-CM

## 2019-03-11 LAB
INR PPP: 3.2 (ref 0.91–1.09)
PROTHROMBIN TIME: 38 SECONDS (ref 10–13.8)

## 2019-03-11 PROCEDURE — 36416 COLLJ CAPILLARY BLOOD SPEC: CPT

## 2019-03-11 PROCEDURE — G0463 HOSPITAL OUTPT CLINIC VISIT: HCPCS

## 2019-03-11 PROCEDURE — 85610 PROTHROMBIN TIME: CPT

## 2019-03-14 PROCEDURE — 87206 SMEAR FLUORESCENT/ACID STAI: CPT | Performed by: NURSE PRACTITIONER

## 2019-03-14 PROCEDURE — 87116 MYCOBACTERIA CULTURE: CPT | Performed by: NURSE PRACTITIONER

## 2019-03-15 ENCOUNTER — OFFICE VISIT (OUTPATIENT)
Dept: GASTROENTEROLOGY | Facility: CLINIC | Age: 84
End: 2019-03-15

## 2019-03-15 VITALS
WEIGHT: 125 LBS | TEMPERATURE: 98 F | SYSTOLIC BLOOD PRESSURE: 122 MMHG | DIASTOLIC BLOOD PRESSURE: 68 MMHG | HEIGHT: 63 IN | BODY MASS INDEX: 22.15 KG/M2

## 2019-03-15 DIAGNOSIS — E73.9 LACTOSE INTOLERANCE: ICD-10-CM

## 2019-03-15 DIAGNOSIS — R14.3 EXCESSIVE GAS: ICD-10-CM

## 2019-03-15 DIAGNOSIS — R14.0 ABDOMINAL BLOATING: Primary | ICD-10-CM

## 2019-03-15 DIAGNOSIS — Z86.19 HISTORY OF CLOSTRIDIUM DIFFICILE INFECTION: ICD-10-CM

## 2019-03-15 PROCEDURE — 87116 MYCOBACTERIA CULTURE: CPT | Performed by: NURSE PRACTITIONER

## 2019-03-15 PROCEDURE — 87206 SMEAR FLUORESCENT/ACID STAI: CPT | Performed by: NURSE PRACTITIONER

## 2019-03-15 PROCEDURE — 99214 OFFICE O/P EST MOD 30 MIN: CPT | Performed by: NURSE PRACTITIONER

## 2019-03-15 NOTE — PROGRESS NOTES
Chief Complaint   Patient presents with   • Bloated       Agnieszka Rizzo is a  88 y.o. female here for a follow up visit for abd bloating and gas.     HPI  87 yo f presents today for follow up visit for abd bloating, excessive gas with hx C-diff. She is a patient of Dr. Wheeler. She was last seen in the office on 1/2/19. She has been having issues with abd bloating and gas. She had negative SIBO test. She underwent CT scan of the abd/pelvis and it was negative for any acute abdominal findings. She does admit her symptoms are better when she stops eating or drinking dairy especially yogurt and ice cream. She does have hx C-diff and is not taking any probiotics. She does not take any fiber. She denies any dysphagia, reflux, abd pain, N&V, diarrhea, constipation, rectal bleeding or melena. She admits her appetite is good and her weight is stable.       Past Medical History:   Diagnosis Date   • Aspergillus (CMS/HCC)    • Asthma    • Atrial fibrillation (CMS/HCC)     chronic   • Atrial flutter (CMS/HCC)    • Bronchiectasis (CMS/HCC)    • C. difficile diarrhea 3/11/2017   • CAD (coronary artery disease)     nonobstructive   • Chronic diastolic CHF (congestive heart failure) (CMS/HCC)    • Colitis    • Cough    • Cryoglobulinemia (CMS/HCC)    • Dyspnea on exertion    • Fall    • Hyperlipidemia    • Hypertension    • Hyponatremia    • Hypoxia    • Infectious viral hepatitis     AGE 13   • Left shoulder pain    • Leg swelling    • Lesion of lung    • Mild tricuspid regurgitation    • MR (mitral regurgitation)     mild   • MVP (mitral valve prolapse)    • Permanent atrial fibrillation (CMS/HCC)    • Pneumonia with the fungal infection aspergillosis (CMS/HCC) 1/6/2017   • Pneumothorax    • SOB (shortness of breath)    • UTI (urinary tract infection)    • Wheeze     mild       Past Surgical History:   Procedure Laterality Date   • BRONCHOSCOPY N/A 11/12/2016    Procedure: BRONCHOSCOPY WITH FLUORO, BRUSHINGS, BAL, AND  BIOPSIES;  Surgeon: Rogelio Tucker MD;  Location: [facility] ENDOSCOPY;  Service:    • BRONCHOSCOPY Bilateral 6/3/2017    Procedure: BRONCHOSCOPY with BAL ;  Surgeon: Sung King MD;  Location: [facility] ENDOSCOPY;  Service:    • CATARACT EXTRACTION EXTRACAPSULAR W/ INTRAOCULAR LENS IMPLANTATION     • COLONOSCOPY      2013   • D&C WITH SUCTION     • HYSTERECTOMY     • KNEE ARTHROSCOPY Left        Scheduled Meds:    Continuous Infusions:  No current facility-administered medications for this visit.     PRN Meds:.    Allergies   Allergen Reactions   • Amlodipine Besylate Swelling   • Aspirin      Caused bleeding ulcers   • Bactrim [Sulfamethoxazole-Trimethoprim] Nausea And Vomiting   • Erythromycin    • Levaquin [Levofloxacin]    • Macrobid [Nitrofurantoin] Nausea Only   • Ramipril Other (See Comments)     Cough       Social History     Socioeconomic History   • Marital status:      Spouse name: Not on file   • Number of children: Not on file   • Years of education: Not on file   • Highest education level: Not on file   Social Needs   • Financial resource strain: Not on file   • Food insecurity - worry: Not on file   • Food insecurity - inability: Not on file   • Transportation needs - medical: Not on file   • Transportation needs - non-medical: Not on file   Occupational History   • Not on file   Tobacco Use   • Smoking status: Never Smoker   • Smokeless tobacco: Never Used   • Tobacco comment: caffiene daily   Substance and Sexual Activity   • Alcohol use: Yes     Comment: occasional   • Drug use: No   • Sexual activity: Yes     Partners: Male   Other Topics Concern   • Not on file   Social History Narrative   • Not on file       Family History   Problem Relation Age of Onset   • Hypertension Mother    • Stroke Mother    • Hypertension Father    • Cancer Son    • Cancer Brother        Review of Systems   Constitutional: Negative for appetite change, chills, diaphoresis, fatigue, fever and unexpected weight change.    HENT: Negative for nosebleeds, postnasal drip, sore throat, trouble swallowing and voice change.    Respiratory: Negative for cough, choking, chest tightness, shortness of breath and wheezing.    Cardiovascular: Negative for chest pain.   Gastrointestinal: Positive for abdominal distention. Negative for abdominal pain, anal bleeding, blood in stool, constipation, diarrhea, nausea, rectal pain and vomiting.   Endocrine: Negative for polydipsia, polyphagia and polyuria.   Musculoskeletal: Negative for gait problem.   Skin: Negative for rash and wound.   Allergic/Immunologic: Negative for food allergies.   Neurological: Negative for dizziness, speech difficulty and light-headedness.   Psychiatric/Behavioral: Negative for confusion, self-injury, sleep disturbance and suicidal ideas.       Vitals:    03/15/19 1019   BP: 122/68   Temp: 98 °F (36.7 °C)       Physical Exam   Constitutional: She is oriented to person, place, and time. She appears well-developed and well-nourished. She does not appear ill. No distress.   HENT:   Head: Normocephalic.   Eyes: Pupils are equal, round, and reactive to light.   Cardiovascular: Normal rate, regular rhythm and normal heart sounds.   Pulmonary/Chest: Effort normal and breath sounds normal.   Abdominal: Soft. Bowel sounds are normal. She exhibits no distension and no mass. There is no hepatosplenomegaly. There is no tenderness. There is no rebound and no guarding. No hernia.   Musculoskeletal: Normal range of motion.   Neurological: She is alert and oriented to person, place, and time.   Skin: Skin is warm and dry.   Psychiatric: She has a normal mood and affect. Her speech is normal and behavior is normal. Judgment normal.       No images are attached to the encounter.    Assessment & Plan    1. Abdominal bloating    2. Excessive gas    3. Lactose intolerance    4. History of Clostridium difficile infection    SIBO testing was negative. She does seem to be better with her bloating  and gas when she cuts out dairy. Recommend with her hx C-diff that she take a daily good probiotic. Will give her handout. Call office in 2-3 weeks with update. Follow up with me in 1 month. Call office with any issues.

## 2019-03-16 PROCEDURE — 87206 SMEAR FLUORESCENT/ACID STAI: CPT | Performed by: NURSE PRACTITIONER

## 2019-03-16 PROCEDURE — 87116 MYCOBACTERIA CULTURE: CPT | Performed by: NURSE PRACTITIONER

## 2019-03-18 ENCOUNTER — TRANSCRIBE ORDERS (OUTPATIENT)
Dept: ADMINISTRATIVE | Facility: HOSPITAL | Age: 84
End: 2019-03-18

## 2019-03-18 ENCOUNTER — APPOINTMENT (OUTPATIENT)
Dept: LAB | Facility: HOSPITAL | Age: 84
End: 2019-03-18

## 2019-03-18 DIAGNOSIS — R74.8: Primary | ICD-10-CM

## 2019-03-20 RX ORDER — POTASSIUM CHLORIDE 750 MG/1
TABLET, EXTENDED RELEASE ORAL
Qty: 90 TABLET | Refills: 3 | Status: SHIPPED | OUTPATIENT
Start: 2019-03-20 | End: 2019-12-16

## 2019-04-01 ENCOUNTER — ANTICOAGULATION VISIT (OUTPATIENT)
Dept: PHARMACY | Facility: HOSPITAL | Age: 84
End: 2019-04-01

## 2019-04-01 DIAGNOSIS — I48.19 PERSISTENT ATRIAL FIBRILLATION (HCC): ICD-10-CM

## 2019-04-01 LAB
INR PPP: 2.7 (ref 0.91–1.09)
PROTHROMBIN TIME: 32.3 SECONDS (ref 10–13.8)

## 2019-04-01 PROCEDURE — 85610 PROTHROMBIN TIME: CPT

## 2019-04-01 PROCEDURE — 36416 COLLJ CAPILLARY BLOOD SPEC: CPT

## 2019-04-01 NOTE — PROGRESS NOTES
Anticoagulation Clinic Progress Note    Anticoagulation Summary  As of 2019    INR goal:   2.0-3.0   TTR:   72.5 % (6.6 mo)   INR used for dosin.7 (2019)   Warfarin maintenance plan:   3 mg every Mon; 4.5 mg all other days   Weekly warfarin total:   30 mg   No change documented:   Reema Dorantes   Plan last modified:   Lina Morris RPH (2019)   Next INR check:   2019   Priority:   Maintenance   Target end date:       Indications    Persistent atrial fibrillation (CMS/HCC) [I48.1]             Anticoagulation Episode Summary     INR check location:       Preferred lab:       Send INR reminders to:    [facility]    Comments:         Anticoagulation Care Providers     Provider Role Specialty Phone number    Marcie Robbins MD Referring Cardiology [phone]          Clinic Interview:  Patient Findings     Negatives:   Signs/symptoms of thrombosis, Signs/symptoms of bleeding,   Laboratory test error suspected, Change in health, Change in alcohol use,   Change in activity, Upcoming invasive procedure, Emergency department   visit, Upcoming dental procedure, Missed doses, Extra doses, Change in   medications, Change in diet/appetite, Hospital admission, Bruising, Other   complaints      Clinical Outcomes     Negatives:   Major bleeding event, Thromboembolic event,   Anticoagulation-related hospital admission, Anticoagulation-related ED   visit, Anticoagulation-related fatality        INR History:  Anticoagulation Monitoring 2/15/2019 3/11/2019 2019   INR 2.5 3.2 2.7   INR Date 2/15/2019 3/11/2019 2019   INR Goal 2.0-3.0 2.0-3.0 2.0-3.0   Trend Same Same Same   Last Week Total 30 mg 30 mg 30 mg   Next Week Total 30 mg 30 mg 30 mg   Sun 4.5 mg 4.5 mg 4.5 mg   Mon 3 mg 3 mg 3 mg   Tue 4.5 mg 4.5 mg 4.5 mg   Wed 4.5 mg 4.5 mg 4.5 mg   Thu 4.5 mg 4.5 mg 4.5 mg   Fri 4.5 mg 4.5 mg 4.5 mg   Sat 4.5 mg 4.5 mg 4.5 mg   Visit Report - - -   Some recent data might be hidden        Plan:  1. INR is therapeutic today- see above in Anticoagulation Summary.   Will instruct Agnisezka Rizzo to continue their warfarin regimen- see above in Anticoagulation Summary.  2. Follow up in 4 weeks.  3. Patient declines warfarin refills.  4. Verbal and written information provided. Patient expresses understanding and has no further questions at this time.    Reema Dorantes

## 2019-04-11 DIAGNOSIS — E78.2 MIXED HYPERLIPIDEMIA: Primary | ICD-10-CM

## 2019-04-11 RX ORDER — ATORVASTATIN CALCIUM 40 MG/1
TABLET, FILM COATED ORAL
Qty: 90 TABLET | Refills: 3 | Status: SHIPPED | OUTPATIENT
Start: 2019-04-11 | End: 2019-12-16

## 2019-04-11 RX ORDER — EZETIMIBE 10 MG/1
TABLET ORAL
Qty: 90 TABLET | Refills: 3 | Status: SHIPPED | OUTPATIENT
Start: 2019-04-11 | End: 2019-12-16

## 2019-04-15 ENCOUNTER — LAB (OUTPATIENT)
Dept: LAB | Facility: HOSPITAL | Age: 84
End: 2019-04-15

## 2019-04-15 ENCOUNTER — OFFICE VISIT (OUTPATIENT)
Dept: GASTROENTEROLOGY | Facility: CLINIC | Age: 84
End: 2019-04-15

## 2019-04-15 VITALS
HEIGHT: 63 IN | DIASTOLIC BLOOD PRESSURE: 68 MMHG | SYSTOLIC BLOOD PRESSURE: 118 MMHG | WEIGHT: 106 LBS | BODY MASS INDEX: 18.78 KG/M2 | TEMPERATURE: 97.6 F

## 2019-04-15 DIAGNOSIS — Z86.19 HISTORY OF CLOSTRIDIUM DIFFICILE INFECTION: ICD-10-CM

## 2019-04-15 DIAGNOSIS — E78.2 MIXED HYPERLIPIDEMIA: ICD-10-CM

## 2019-04-15 DIAGNOSIS — R14.3 EXCESSIVE GAS: Primary | ICD-10-CM

## 2019-04-15 DIAGNOSIS — R14.0 ABDOMINAL BLOATING: ICD-10-CM

## 2019-04-15 DIAGNOSIS — K90.49 DAIRY PRODUCT INTOLERANCE: ICD-10-CM

## 2019-04-15 LAB
CHOLEST SERPL-MCNC: 185 MG/DL (ref 0–200)
HDLC SERPL-MCNC: 76 MG/DL (ref 40–60)
LDLC SERPL CALC-MCNC: 90 MG/DL (ref 0–100)
LDLC/HDLC SERPL: 1.18 {RATIO}
TRIGL SERPL-MCNC: 95 MG/DL (ref 0–150)
VLDLC SERPL-MCNC: 19 MG/DL (ref 5–40)

## 2019-04-15 PROCEDURE — 99214 OFFICE O/P EST MOD 30 MIN: CPT | Performed by: NURSE PRACTITIONER

## 2019-04-15 PROCEDURE — 36415 COLL VENOUS BLD VENIPUNCTURE: CPT

## 2019-04-15 PROCEDURE — 80061 LIPID PANEL: CPT

## 2019-04-15 RX ORDER — LACTOBACILLUS ACIDOPHILUS 20B CELL
1 CAPSULE ORAL DAILY
Status: ON HOLD | COMMUNITY

## 2019-04-15 NOTE — PROGRESS NOTES
Chief Complaint   Patient presents with   • Follow-up   • Bloated       Agnieszka Rizzo is a  88 y.o. female here for a follow up visit for gas and bloating.     HPI  87 yo f presents today for follow up visit for abd bloating, excessive gas and intolerance to dairy. She is a patient of Dr. Wheeler. She was last seen in the office on 3/15/19. She admits since her last visit that the gas has been much improved since she has been avoiding most dairy and changing her probiotics. She admits the bloating is still an issue but she thinks it is somewhat better. She thinks some of the bloating is abdominal fat and plans to get back on the [place] Diet. She admits this has worked for her in the past and she plans to start it again. She admits its been hard for her to cut out all dairy since she likes to drink a glass of milk with every meal. She has bought almond milk and is using it in her cereal but she hasn't been able to drink it straight from a glass yet. She does have hx C-diff but admits her bowels are moving well at this time. She denies any dysphagia, reflux, abd pain, N&V, diarrhea, constipation, rectal bleeding or melena. She admits her appetite is good and her weight is stable.       Past Medical History:   Diagnosis Date   • Aspergillus (CMS/HCC)    • Asthma    • Atrial fibrillation (CMS/HCC)     chronic   • Atrial flutter (CMS/HCC)    • Bronchiectasis (CMS/HCC)    • C. difficile diarrhea 3/11/2017   • CAD (coronary artery disease)     nonobstructive   • Chronic diastolic CHF (congestive heart failure) (CMS/HCC)    • Colitis    • Cough    • Cryoglobulinemia (CMS/HCC)    • Dyspnea on exertion    • Fall    • Hyperlipidemia    • Hypertension    • Hyponatremia    • Hypoxia    • Infectious viral hepatitis     AGE 13   • Left shoulder pain    • Leg swelling    • Lesion of lung    • Mild tricuspid regurgitation    • MR (mitral regurgitation)     mild   • MVP (mitral valve prolapse)    • Permanent atrial  fibrillation (CMS/[facility])    • Pneumonia with the fungal infection aspergillosis (CMS/[facility]) 1/6/2017   • Pneumothorax    • SOB (shortness of breath)    • UTI (urinary tract infection)    • Wheeze     mild       Past Surgical History:   Procedure Laterality Date   • BRONCHOSCOPY N/A 11/12/2016    Procedure: BRONCHOSCOPY WITH FLUORO, BRUSHINGS, BAL, AND BIOPSIES;  Surgeon: Rogelio Tucker MD;  Location: [facility] ENDOSCOPY;  Service:    • BRONCHOSCOPY Bilateral 6/3/2017    Procedure: BRONCHOSCOPY with BAL ;  Surgeon: Sung King MD;  Location: [facility] ENDOSCOPY;  Service:    • CATARACT EXTRACTION EXTRACAPSULAR W/ INTRAOCULAR LENS IMPLANTATION     • COLONOSCOPY      2013   • D&C WITH SUCTION     • HYSTERECTOMY     • KNEE ARTHROSCOPY Left        Scheduled Meds:    Continuous Infusions:  No current facility-administered medications for this visit.     PRN Meds:.    Allergies   Allergen Reactions   • Amlodipine Besylate Swelling   • Aspirin      Caused bleeding ulcers   • Bactrim [Sulfamethoxazole-Trimethoprim] Nausea And Vomiting   • Erythromycin Unknown (See Comments)     Patient does not recall type of reaction.   • Levaquin [Levofloxacin] Unknown (See Comments)     Patient does not recall what type of reaction.   • Macrobid [Nitrofurantoin] Nausea Only   • Ramipril Other (See Comments)     Cough       Social History     Socioeconomic History   • Marital status:      Spouse name: Not on file   • Number of children: Not on file   • Years of education: Not on file   • Highest education level: Not on file   Tobacco Use   • Smoking status: Never Smoker   • Smokeless tobacco: Never Used   • Tobacco comment: caffiene daily   Substance and Sexual Activity   • Alcohol use: Yes     Comment: occasional   • Drug use: No   • Sexual activity: Yes     Partners: Male       Family History   Problem Relation Age of Onset   • Hypertension Mother    • Stroke Mother    • Hypertension Father    • Cancer Son    • Cancer Brother        Review  of Systems   Constitutional: Negative for appetite change, chills, diaphoresis, fatigue, fever and unexpected weight change.   HENT: Negative for nosebleeds, postnasal drip, sore throat, trouble swallowing and voice change.    Respiratory: Negative for cough, choking, chest tightness, shortness of breath and wheezing.    Cardiovascular: Negative for chest pain.   Gastrointestinal: Positive for abdominal distention. Negative for abdominal pain, anal bleeding, blood in stool, constipation, diarrhea, nausea, rectal pain and vomiting.   Endocrine: Negative for polydipsia, polyphagia and polyuria.   Musculoskeletal: Negative for gait problem.   Skin: Negative for rash and wound.   Allergic/Immunologic: Negative for food allergies.   Neurological: Negative for dizziness, speech difficulty and light-headedness.   Psychiatric/Behavioral: Negative for confusion, self-injury, sleep disturbance and suicidal ideas.       Vitals:    04/15/19 0843   BP: 118/68   Temp: 97.6 °F (36.4 °C)       Physical Exam   Constitutional: She is oriented to person, place, and time. She appears well-developed and well-nourished. She does not appear ill. No distress.   HENT:   Head: Normocephalic.   Eyes: Pupils are equal, round, and reactive to light.   Cardiovascular: Normal rate, regular rhythm and normal heart sounds.   Pulmonary/Chest: Effort normal and breath sounds normal.   Abdominal: Soft. Bowel sounds are normal. She exhibits no distension and no mass. There is no hepatosplenomegaly. There is no tenderness. There is no rebound and no guarding. No hernia.   Musculoskeletal: Normal range of motion.   Neurological: She is alert and oriented to person, place, and time.   Skin: Skin is warm and dry.   Psychiatric: She has a normal mood and affect. Her speech is normal and behavior is normal. Judgment normal.       No images are attached to the encounter.    Assessment & Plan    1. Excessive gas    2. Abdominal bloating    3. History of  Clostridium difficile infection    4. Dairy product intolerance    Excessive gas has resolved. The bloating has improved but still an issue for the patient. Recommend she continue the probiotic and avoid dairy the best she can. Call office with any issues. Follow up with me in 1 month.

## 2019-04-17 ENCOUNTER — TELEPHONE (OUTPATIENT)
Dept: CARDIOLOGY | Facility: CLINIC | Age: 84
End: 2019-04-17

## 2019-04-17 RX ORDER — ALBUTEROL SULFATE 90 UG/1
AEROSOL, METERED RESPIRATORY (INHALATION)
Refills: 5 | COMMUNITY
Start: 2019-03-14 | End: 2019-12-16

## 2019-04-17 RX ORDER — NITROFURANTOIN 25; 75 MG/1; MG/1
CAPSULE ORAL
Refills: 0 | COMMUNITY
Start: 2019-02-20 | End: 2019-04-18

## 2019-04-17 RX ORDER — PREDNISONE 10 MG/1
TABLET ORAL
Refills: 0 | COMMUNITY
Start: 2019-02-21 | End: 2019-04-18

## 2019-04-17 RX ORDER — CEFDINIR 300 MG/1
CAPSULE ORAL
Refills: 0 | COMMUNITY
Start: 2019-02-21 | End: 2019-04-18

## 2019-04-17 RX ORDER — ACETAMINOPHEN 500 MG
TABLET ORAL
COMMUNITY
End: 2019-12-16

## 2019-04-17 NOTE — TELEPHONE ENCOUNTER
Pt wanted to know her results of lipid panel and recommendations/ changes to medications if necessary   No

## 2019-04-18 ENCOUNTER — OFFICE VISIT (OUTPATIENT)
Dept: INTERNAL MEDICINE | Facility: CLINIC | Age: 84
End: 2019-04-18

## 2019-04-18 VITALS
SYSTOLIC BLOOD PRESSURE: 108 MMHG | TEMPERATURE: 98.5 F | OXYGEN SATURATION: 97 % | BODY MASS INDEX: 23.65 KG/M2 | WEIGHT: 133.5 LBS | HEART RATE: 57 BPM | DIASTOLIC BLOOD PRESSURE: 72 MMHG

## 2019-04-18 DIAGNOSIS — Z00.00 MEDICARE ANNUAL WELLNESS VISIT, SUBSEQUENT: Primary | ICD-10-CM

## 2019-04-18 DIAGNOSIS — I10 BENIGN ESSENTIAL HYPERTENSION: ICD-10-CM

## 2019-04-18 DIAGNOSIS — J47.1 BRONCHIECTASIS WITH ACUTE EXACERBATION (HCC): ICD-10-CM

## 2019-04-18 DIAGNOSIS — I48.91 ATRIAL FIBRILLATION WITH RAPID VENTRICULAR RESPONSE (HCC): ICD-10-CM

## 2019-04-18 PROCEDURE — G0439 PPPS, SUBSEQ VISIT: HCPCS | Performed by: FAMILY MEDICINE

## 2019-04-18 PROCEDURE — 99213 OFFICE O/P EST LOW 20 MIN: CPT | Performed by: FAMILY MEDICINE

## 2019-04-18 NOTE — PROGRESS NOTES
QUICK REFERENCE INFORMATION:  The ABCs of the Annual Wellness Visit    Subsequent Medicare Wellness Visit     HEALTH RISK ASSESSMENT    : 1930    Recent Hospitalizations:  No hospitalization(s) within the last year..        Current Medical Providers:  Patient Care Team:  Rogelio Tucker MD as PCP - General (Pulmonary Disease)  Hayden Landry MD as PCP - Claims Attributed  Marcie Robbins MD as Consulting Physician (Cardiology)  Nilesh Danielle MD as Consulting Physician (Urology)  Lina Morris RPH as Pharmacist        Smoking Status:  Social History     Tobacco Use   Smoking Status Never Smoker   Smokeless Tobacco Never Used   Tobacco Comment    caffiene daily       Alcohol Consumption:  Social History     Substance and Sexual Activity   Alcohol Use Yes    Comment: occasional       Depression Screen:   PHQ-2/PHQ-9 Depression Screening 2019   Little interest or pleasure in doing things 0   Feeling down, depressed, or hopeless 0   Trouble falling or staying asleep, or sleeping too much -   Feeling tired or having little energy -   Poor appetite or overeating -   Feeling bad about yourself - or that you are a failure or have let yourself or your family down -   Trouble concentrating on things, such as reading the newspaper or watching television -   Moving or speaking so slowly that other people could have noticed. Or the opposite - being so fidgety or restless that you have been moving around a lot more than usual -   Thoughts that you would be better off dead, or of hurting yourself in some way -   Total Score 0   If you checked off any problems, how difficult have these problems made it for you to do your work, take care of things at home, or get along with other people? -       Health Habits and Functional and Cognitive Screening:  Functional & Cognitive Status 2019   Do you have difficulty preparing food and eating? No   Do you have difficulty bathing yourself, getting dressed or  grooming yourself? No   Do you have difficulty using the toilet? No   Do you have difficulty moving around from place to place? No   Do you have trouble with steps or getting out of a bed or a chair? No   In the past year have you fallen or experienced a near fall? No   Current Diet Well Balanced Diet   Dental Exam Up to date   Eye Exam Up to date   Exercise (times per week) 3 times per week   Current Exercise Activities Include Stationary Bicycling/Spin Class   Do you need help using the phone?  No   Are you deaf or do you have serious difficulty hearing?  No   Do you need help with transportation? No   Do you need help shopping? No   Do you need help preparing meals?  No   Do you need help with housework?  No   Do you need help with laundry? No   Do you need help taking your medications? No   Do you need help managing money? No   Do you ever drive or ride in a car without wearing a seat belt? No   Have you felt unusual stress, anger or loneliness in the last month? No   Who do you live with? Alone   If you need help, do you have trouble finding someone available to you? No   Have you been bothered in the last four weeks by sexual problems? No   Do you have difficulty concentrating, remembering or making decisions? No           Does the patient have evidence of cognitive impairment? No    Asiprin use counseling: Taking ASA appropriately as indicated      Recent Lab Results:    Lab Results   Component Value Date    GLU 93 06/20/2017        Lab Results   Component Value Date    CHOL 185 04/15/2019    TRIG 95 04/15/2019    HDL 76 (H) 04/15/2019    VLDL 19 04/15/2019    LDLHDL 1.18 04/15/2019           Age-appropriate Screening Schedule:  Refer to the list below for future screening recommendations based on patient's age, sex and/or medical conditions. Orders for these recommended tests are listed in the plan section. The patient has been provided with a written plan.    Health Maintenance   Topic Date Due   • TDAP/TD  VACCINES (1 - Tdap) 08/07/1949   • LIPID PANEL  04/15/2020   • ZOSTER VACCINE  Completed   • INFLUENZA VACCINE  Discontinued   • PNEUMOCOCCAL VACCINES (65+ LOW/MEDIUM RISK)  Discontinued        Subjective   History of Present Illness    Agnieszka Rizzo is a 88 y.o. female who presents for an Annual Wellness Visit.    The following portions of the patient's history were reviewed and updated as appropriate: allergies, current medications, past family history, past medical history, past social history, past surgical history and problem list.    Outpatient Medications Prior to Visit   Medication Sig Dispense Refill   • acetaminophen (TYLENOL) 500 MG tablet 2 daily prn     • atorvastatin (LIPITOR) 40 MG tablet TAKE 1 TABLET EVERY DAY 90 tablet 3   • B Complex Vitamins (VITAMIN B COMPLEX PO) Take 1 tablet by mouth Daily.     • calcium carbonate (OS-EVIN) 600 MG tablet Take 600 mg by mouth Daily. With vitamin D     • cetirizine (zyrTEC) 10 MG tablet Take 10 mg by mouth Daily.     • Dextromethorphan Polistirex (DELSYM PO) Take 1 dose by mouth 2 (Two) Times a Day As Needed.     • ezetimibe (ZETIA) 10 MG tablet TAKE 1 TABLET EVERY DAY 90 tablet 3   • Fexofenadine HCl (MUCINEX ALLERGY PO) Take 1 tablet by mouth 2 (Two) Times a Day As Needed.     • fluticasone (FLONASE) 50 MCG/ACT nasal spray 2 sprays into each nostril Daily.     • fluticasone-salmeterol (ADVAIR HFA) 230-21 MCG/ACT inhaler Inhale 2 puffs 2 (Two) Times a Day.     • glucosamine-chondroitin 500-400 MG capsule capsule Take 1 capsule by mouth Daily As Needed.     • ipratropium-albuterol (DUO-NEB) 0.5-2.5 mg/3 ml nebulizer Take 3 mL by nebulization 2 (Two) Times a Day.     • Lactobacillus (FLORAJEN ACIDOPHILUS) capsule Take 1 tablet by mouth Daily.     • LITTLE NOSES SALINE NASAL MIST aerosol solution 1 spray into the nostril(s) as directed by provider 2 (Two) Times a Day As Needed.     • O2 (OXYGEN) Inhale 2 L/min Every Night.     • potassium chloride (K-DUR,KLOR-CON)  10 MEQ CR tablet TAKE 1 TABLET EVERY DAY 90 tablet 3   • sodium chloride 7 % nebulizer solution nebulizer solution Take 4 mL by nebulization 2 (Two) Times a Day.     • torsemide (DEMADEX) 20 MG tablet TAKE 1 TABLET TWICE DAILY 180 tablet 3   • VENTOLIN  (90 Base) MCG/ACT inhaler INL 2 PFS PO Q 4 H PRN  5   • verapamil PM (VERELAN PM) 360 MG 24 hr capsule Take 1 capsule by mouth Every Night. 90 capsule 3   • warfarin (COUMADIN) 3 MG tablet Take 1.5 tablets by mouth Daily. or as directed by medication management clinic 135 tablet 0   • cefdinir (OMNICEF) 300 MG capsule TK 1 C PO Q 12 H WITH FOOD FOR 8 DAYS  0   • fluticasone-salmeterol (ADVAIR DISKUS) 100-50 MCG/DOSE DISKUS 2 puffs BID     • Lactobacillus (ACIDOPHILUS PO) Take 2 tablets by mouth Daily.     • nitrofurantoin, macrocrystal-monohydrate, (MACROBID) 100 MG capsule TK 1 C PO Q 12 H WC FOR 5 DAYS  0   • predniSONE (DELTASONE) 10 MG tablet TK 1 T PO BID WITH FOOD FOR 4 DAYS  0     No facility-administered medications prior to visit.        Patient Active Problem List   Diagnosis   • Benign essential hypertension   • Chronic coronary artery disease   • Hyperlipidemia   • Mitral valve insufficiency   • Ventricular premature beats   • Ventricular tachycardia (CMS/HCC)   • Persistent atrial fibrillation (CMS/HCC)   • Acid-fast bacteria present   • DNR (do not resuscitate)   • Sepsis (CMS/HCC)   • Chronic diastolic CHF (congestive heart failure) (CMS/HCC)   • CHF (congestive heart failure) (CMS/HCC)   • Asymptomatic bacteriuria   • Iron deficiency anemia   • Bronchiectasis (CMS/HCC)   • Pneumonia of both lungs due to infectious organism   • KINA (mycobacterium avium-intracellulare) (CMS/HCC)   • Atrial fibrillation with rapid ventricular response (CMS/HCC)   • Anxiety   • Exposure to hepatitis A   • Medicare annual wellness visit, subsequent       Advance Care Planning:  Patient has an advance directive - a copy has been provided and is visible in patient  header  Rose Zaldivar - daughter  Identification of Risk Factors:  Risk factors include: isolation.    Review of Systems    Compared to one year ago, the patient feels her physical health is better.  Compared to one year ago, the patient feels her mental health is better.    Objective     Physical Exam    Vitals:    04/18/19 0937   BP: 108/72   BP Location: Right arm   Patient Position: Sitting   Cuff Size: Adult   Pulse: 57   Temp: 98.5 °F (36.9 °C)   TempSrc: Oral   SpO2: 97%   Weight: 60.6 kg (133 lb 8 oz)   PainSc: 0-No pain       Patient's Body mass index is 23.65 kg/m². BMI is within normal parameters. No follow-up required..      Assessment/Plan   Patient Self-Management and Personalized Health Advice  The patient has been provided with information about: diet and preventive services including:   · current.    Visit Diagnoses:    ICD-10-CM ICD-9-CM   1. Medicare annual wellness visit, subsequent Z00.00 V70.0   2. Atrial fibrillation with rapid ventricular response (CMS/[facility]) I48.91 427.31   3. Benign essential hypertension I10 401.1   4. Bronchiectasis with acute exacerbation (CMS/[facility]) J47.1 494.1       No orders of the defined types were placed in this encounter.      Outpatient Encounter Medications as of 4/18/2019   Medication Sig Dispense Refill   • acetaminophen (TYLENOL) 500 MG tablet 2 daily prn     • atorvastatin (LIPITOR) 40 MG tablet TAKE 1 TABLET EVERY DAY 90 tablet 3   • B Complex Vitamins (VITAMIN B COMPLEX PO) Take 1 tablet by mouth Daily.     • calcium carbonate (OS-EVIN) 600 MG tablet Take 600 mg by mouth Daily. With vitamin D     • cetirizine (zyrTEC) 10 MG tablet Take 10 mg by mouth Daily.     • Dextromethorphan Polistirex (DELSYM PO) Take 1 dose by mouth 2 (Two) Times a Day As Needed.     • ezetimibe (ZETIA) 10 MG tablet TAKE 1 TABLET EVERY DAY 90 tablet 3   • Fexofenadine HCl (MUCINEX ALLERGY PO) Take 1 tablet by mouth 2 (Two) Times a Day As Needed.     • fluticasone (FLONASE) 50 MCG/ACT  nasal spray 2 sprays into each nostril Daily.     • fluticasone-salmeterol (ADVAIR HFA) 230-21 MCG/ACT inhaler Inhale 2 puffs 2 (Two) Times a Day.     • glucosamine-chondroitin 500-400 MG capsule capsule Take 1 capsule by mouth Daily As Needed.     • ipratropium-albuterol (DUO-NEB) 0.5-2.5 mg/3 ml nebulizer Take 3 mL by nebulization 2 (Two) Times a Day.     • Lactobacillus (FLORAJEN ACIDOPHILUS) capsule Take 1 tablet by mouth Daily.     • LITTLE NOSES SALINE NASAL MIST aerosol solution 1 spray into the nostril(s) as directed by provider 2 (Two) Times a Day As Needed.     • O2 (OXYGEN) Inhale 2 L/min Every Night.     • potassium chloride (K-DUR,KLOR-CON) 10 MEQ CR tablet TAKE 1 TABLET EVERY DAY 90 tablet 3   • sodium chloride 7 % nebulizer solution nebulizer solution Take 4 mL by nebulization 2 (Two) Times a Day.     • torsemide (DEMADEX) 20 MG tablet TAKE 1 TABLET TWICE DAILY 180 tablet 3   • VENTOLIN  (90 Base) MCG/ACT inhaler INL 2 PFS PO Q 4 H PRN  5   • verapamil PM (VERELAN PM) 360 MG 24 hr capsule Take 1 capsule by mouth Every Night. 90 capsule 3   • warfarin (COUMADIN) 3 MG tablet Take 1.5 tablets by mouth Daily. or as directed by medication management clinic 135 tablet 0   • [DISCONTINUED] cefdinir (OMNICEF) 300 MG capsule TK 1 C PO Q 12 H WITH FOOD FOR 8 DAYS  0   • [DISCONTINUED] fluticasone-salmeterol (ADVAIR DISKUS) 100-50 MCG/DOSE DISKUS 2 puffs BID     • [DISCONTINUED] Lactobacillus (ACIDOPHILUS PO) Take 2 tablets by mouth Daily.     • [DISCONTINUED] nitrofurantoin, macrocrystal-monohydrate, (MACROBID) 100 MG capsule TK 1 C PO Q 12 H WC FOR 5 DAYS  0   • [DISCONTINUED] predniSONE (DELTASONE) 10 MG tablet TK 1 T PO BID WITH FOOD FOR 4 DAYS  0     No facility-administered encounter medications on file as of 4/18/2019.        Reviewed use of high risk medication in the elderly: yes  Reviewed for potential of harmful drug interactions in the elderly: not applicable    Follow Up:  Return in about 6  months (around 10/18/2019), or if symptoms worsen or fail to improve, for Next scheduled follow up.     An After Visit Summary and PPPS with all of these plans were given to the patient.

## 2019-04-18 NOTE — PROGRESS NOTES
Agnieszka Rizzo is a 88 y.o. female.      Assessment/Plan   Problem List Items Addressed This Visit        Cardiovascular and Mediastinum    Benign essential hypertension    Atrial fibrillation with rapid ventricular response (CMS/HCC)       Respiratory    Bronchiectasis (CMS/[facility])    Overview     Dr. Tucker         Relevant Medications    Fexofenadine HCl (MUCINEX ALLERGY PO)    Dextromethorphan Polistirex (DELSYM PO)       Other    Medicare annual wellness visit, subsequent - Primary         Continue present management of chronic problems  Follow-up as needed or  Return in about 7 months (around 11/18/2019), or if symptoms worsen or fail to improve, for Next scheduled follow up, Recheck.      Chief Complaint   Patient presents with   • Medicare Wellness-subsequent   Hyperlipidemia  Social History     Tobacco Use   • Smoking status: Never Smoker   • Smokeless tobacco: Never Used   • Tobacco comment: caffiene daily   Substance Use Topics   • Alcohol use: Yes     Comment: occasional   • Drug use: No       History of Present Illness   Follows up for chronic problems of hyperlipidemia reviewed trending lipid profile with patient she is very happy with where she is having no unwanted side effects or medication she is improved since having her pneumonia Mack is cleared and GI situation seems to be stable at this point she is followed by cardiology as well with no symptomology related to cardiac dysfunction no cough no shortness of breath no swelling no weight gain the following portions of the patient's history were reviewed and updated as appropriate:PMHroutine: Social history , Allergies, Current Medications, Active Problem List and Health Maintenance    Review of Systems   Constitutional: Negative.    HENT: Negative.    Respiratory: Negative.    Cardiovascular: Negative.    Gastrointestinal: Positive for abdominal distention.   Genitourinary: Negative.    Musculoskeletal: Negative.    Neurological: Negative.     Psychiatric/Behavioral: Negative.        Objective   Vitals:    04/18/19 0937   BP: 108/72   BP Location: Right arm   Patient Position: Sitting   Cuff Size: Adult   Pulse: 57   Temp: 98.5 °F (36.9 °C)   TempSrc: Oral   SpO2: 97%   Weight: 60.6 kg (133 lb 8 oz)     Body mass index is 23.65 kg/m².  Physical Exam   Constitutional: She is oriented to person, place, and time. She appears well-developed and well-nourished. No distress.   HENT:   Head: Normocephalic and atraumatic.   Eyes: Conjunctivae are normal. No scleral icterus.   Neck: Neck supple. No thyromegaly present.   Cardiovascular: Normal rate, regular rhythm, normal heart sounds, intact distal pulses and normal pulses. Exam reveals no gallop.   No murmur heard.  Pulmonary/Chest: Effort normal and breath sounds normal. No respiratory distress. She has no wheezes. She has no rales.   Abdominal: Soft. Bowel sounds are normal.   Musculoskeletal: Normal range of motion. She exhibits no edema.   Neurological: She is alert and oriented to person, place, and time. She exhibits normal muscle tone. Coordination normal.   Skin: Skin is warm. No rash noted. No erythema. No pallor.   Psychiatric: She has a normal mood and affect. Her behavior is normal. Judgment and thought content normal.   Nursing note and vitals reviewed.    Reviewed Data:  Lab on 04/15/2019   Component Date Value Ref Range Status   • Total Cholesterol 04/15/2019 185  0 - 200 mg/dL Final   • Triglycerides 04/15/2019 95  0 - 150 mg/dL Final   • HDL Cholesterol 04/15/2019 76* 40 - 60 mg/dL Final   • LDL Cholesterol  04/15/2019 90  0 - 100 mg/dL Final   • VLDL Cholesterol 04/15/2019 19  5 - 40 mg/dL Final   • LDL/HDL Ratio 04/15/2019 1.18   Final   Anticoagulation Visit on 04/01/2019   Component Date Value Ref Range Status   • Protime 04/01/2019 32.3* 10.0 - 13.8 seconds Final   • INR 04/01/2019 2.7* 0.91 - 1.09 Final

## 2019-04-23 RX ORDER — WARFARIN SODIUM 3 MG/1
TABLET ORAL
Qty: 135 TABLET | Refills: 0 | Status: SHIPPED | OUTPATIENT
Start: 2019-04-23 | End: 2019-08-09 | Stop reason: SDUPTHER

## 2019-04-29 ENCOUNTER — ANTICOAGULATION VISIT (OUTPATIENT)
Dept: PHARMACY | Facility: HOSPITAL | Age: 84
End: 2019-04-29

## 2019-04-29 DIAGNOSIS — I48.19 PERSISTENT ATRIAL FIBRILLATION (HCC): ICD-10-CM

## 2019-04-29 LAB
INR PPP: 2.3 (ref 0.91–1.09)
MYCOBACTERIUM SPEC CULT: NORMAL
NIGHT BLUE STAIN TISS: NORMAL
PROTHROMBIN TIME: 27.1 SECONDS (ref 10–13.8)

## 2019-04-29 PROCEDURE — 36416 COLLJ CAPILLARY BLOOD SPEC: CPT

## 2019-04-29 PROCEDURE — 85610 PROTHROMBIN TIME: CPT

## 2019-04-29 NOTE — PROGRESS NOTES
Anticoagulation Clinic Progress Note    Anticoagulation Summary  As of 2019    INR goal:   2.0-3.0   TTR:   75.9 % (7.6 mo)   INR used for dosin.3 (2019)   Warfarin maintenance plan:   3 mg every Mon; 4.5 mg all other days   Weekly warfarin total:   30 mg   No change documented:   Danielle Fierro   Plan last modified:   Lina Morris RPH (2019)   Next INR check:   2019   Priority:   Maintenance   Target end date:       Indications    Persistent atrial fibrillation (CMS/HCC) [I48.1]             Anticoagulation Episode Summary     INR check location:       Preferred lab:       Send INR reminders to:    [facility]    Comments:         Anticoagulation Care Providers     Provider Role Specialty Phone number    Marcie Robbins MD Referring Cardiology [phone]          Clinic Interview:  Patient Findings     Negatives:   Signs/symptoms of thrombosis, Signs/symptoms of bleeding,   Laboratory test error suspected, Change in health, Change in alcohol use,   Change in activity, Upcoming invasive procedure, Emergency department   visit, Upcoming dental procedure, Missed doses, Extra doses, Change in   medications, Change in diet/appetite, Hospital admission, Bruising, Other   complaints      Clinical Outcomes     Negatives:   Major bleeding event, Thromboembolic event,   Anticoagulation-related hospital admission, Anticoagulation-related ED   visit, Anticoagulation-related fatality        INR History:  Anticoagulation Monitoring 3/11/2019 2019 2019   INR 3.2 2.7 2.3   INR Date 3/11/2019 2019 2019   INR Goal 2.0-3.0 2.0-3.0 2.0-3.0   Trend Same Same Same   Last Week Total 30 mg 30 mg 30 mg   Next Week Total 30 mg 30 mg 30 mg   Sun 4.5 mg 4.5 mg 4.5 mg   Mon 3 mg 3 mg 3 mg   Tue 4.5 mg 4.5 mg 4.5 mg   Wed 4.5 mg 4.5 mg 4.5 mg   Thu 4.5 mg 4.5 mg 4.5 mg   Fri 4.5 mg 4.5 mg 4.5 mg   Sat 4.5 mg 4.5 mg 4.5 mg   Visit Report - - -   Some recent data might be hidden        Plan:  1. INR is therapeutic today- see above in Anticoagulation Summary.   Will instruct Agnieszka Rizzo to continue their warfarin regimen- see above in Anticoagulation Summary.  2. Follow up in 4 weeks.  3. Patient declines warfarin refills.  4. Verbal and written information provided. Patient expresses understanding and has no further questions at this time.    Danielle Fierro

## 2019-05-29 ENCOUNTER — ANTICOAGULATION VISIT (OUTPATIENT)
Dept: PHARMACY | Facility: HOSPITAL | Age: 84
End: 2019-05-29

## 2019-05-29 DIAGNOSIS — I48.19 PERSISTENT ATRIAL FIBRILLATION (HCC): ICD-10-CM

## 2019-05-29 LAB
INR PPP: 2.9 (ref 0.91–1.09)
PROTHROMBIN TIME: 34.8 SECONDS (ref 10–13.8)

## 2019-05-29 PROCEDURE — 85610 PROTHROMBIN TIME: CPT

## 2019-05-29 PROCEDURE — 36416 COLLJ CAPILLARY BLOOD SPEC: CPT

## 2019-05-29 NOTE — PROGRESS NOTES
Anticoagulation Clinic Progress Note    Anticoagulation Summary  As of 2019    INR goal:   2.0-3.0   TTR:   78.7 % (8.6 mo)   INR used for dosin.9 (2019)   Warfarin maintenance plan:   3 mg every Mon; 4.5 mg all other days   Weekly warfarin total:   30 mg   No change documented:   Danielle Fierro   Plan last modified:   Lina Morris RPH (2019)   Next INR check:   2019   Priority:   Maintenance   Target end date:       Indications    Persistent atrial fibrillation (CMS/HCC) [I48.1]             Anticoagulation Episode Summary     INR check location:       Preferred lab:       Send INR reminders to:    [facility]    Comments:         Anticoagulation Care Providers     Provider Role Specialty Phone number    Marcie Robbins MD Referring Cardiology [phone]          Clinic Interview:  Patient Findings     Negatives:   Signs/symptoms of thrombosis, Signs/symptoms of bleeding,   Laboratory test error suspected, Change in health, Change in alcohol use,   Change in activity, Upcoming invasive procedure, Emergency department   visit, Upcoming dental procedure, Missed doses, Extra doses, Change in   medications, Change in diet/appetite, Hospital admission, Bruising, Other   complaints      Clinical Outcomes     Negatives:   Major bleeding event, Thromboembolic event,   Anticoagulation-related hospital admission, Anticoagulation-related ED   visit, Anticoagulation-related fatality        INR History:  Anticoagulation Monitoring 2019   INR 2.7 2.3 2.9   INR Date 2019   INR Goal 2.0-3.0 2.0-3.0 2.0-3.0   Trend Same Same Same   Last Week Total 30 mg 30 mg 30 mg   Next Week Total 30 mg 30 mg 30 mg   Sun 4.5 mg 4.5 mg 4.5 mg   Mon 3 mg 3 mg 3 mg   Tue 4.5 mg 4.5 mg 4.5 mg   Wed 4.5 mg 4.5 mg 4.5 mg   Thu 4.5 mg 4.5 mg 4.5 mg   Fri 4.5 mg 4.5 mg 4.5 mg   Sat 4.5 mg 4.5 mg 4.5 mg   Visit Report - - -   Some recent data might be hidden        Plan:  1. INR is therapeutic today- see above in Anticoagulation Summary.   Will instruct Agnieszka Rizzo to continue their warfarin regimen- see above in Anticoagulation Summary.  2. Follow up in 5 weeks.  3. Patient declines warfarin refills.  4. Verbal and written information provided. Patient expresses understanding and has no further questions at this time.    Danielle Fierro

## 2019-05-31 ENCOUNTER — OFFICE VISIT (OUTPATIENT)
Dept: INFECTIOUS DISEASES | Facility: CLINIC | Age: 84
End: 2019-05-31

## 2019-05-31 VITALS
TEMPERATURE: 97.9 F | BODY MASS INDEX: 22.5 KG/M2 | HEIGHT: 63 IN | WEIGHT: 127 LBS | SYSTOLIC BLOOD PRESSURE: 147 MMHG | RESPIRATION RATE: 12 BRPM | DIASTOLIC BLOOD PRESSURE: 71 MMHG | HEART RATE: 68 BPM

## 2019-05-31 DIAGNOSIS — Z79.01 ON COUMADIN FOR ATRIAL FIBRILLATION (HCC): ICD-10-CM

## 2019-05-31 DIAGNOSIS — I48.91 ON COUMADIN FOR ATRIAL FIBRILLATION (HCC): ICD-10-CM

## 2019-05-31 DIAGNOSIS — I48.20 CHRONIC ATRIAL FIBRILLATION (HCC): ICD-10-CM

## 2019-05-31 DIAGNOSIS — Z86.19 HISTORY OF CLOSTRIDIUM DIFFICILE COLITIS: ICD-10-CM

## 2019-05-31 DIAGNOSIS — J47.9 BRONCHIECTASIS, NON-TUBERCULOUS (HCC): ICD-10-CM

## 2019-05-31 DIAGNOSIS — A31.0 PULMONARY MYCOBACTERIUM AVIUM COMPLEX (MAC) INFECTION (HCC): Primary | ICD-10-CM

## 2019-05-31 PROCEDURE — 99213 OFFICE O/P EST LOW 20 MIN: CPT | Performed by: INTERNAL MEDICINE

## 2019-05-31 NOTE — PROGRESS NOTES
"CC: f/u pulmonary MAC    HPI: Agnieszka Rizzo \"Renny\" is a 88 y.o. female here for f/u pulmonary MAC. -she completed 12 months of azithromycin 500 mg PO qMWF, ethambutol 1200 mg PO qMWF, and rifampin 600 mg PO as of 11/28/2018. Last visit at the end of therapy, she provided a sputum AFB sample that was negative and her CT chest showed interval clearing of airspace opacities.     She had a cough back in February. She was given an antibiotic and a steroid and returned to normal. She ultimately had 3 AFB sputum samples done in March that were negative for AFB.     She still follows with Dr Tucker and uses a nebulizer with albuterol, Aerobika, and a vest for percussion therapy.  She has not been hospitalized in about 2 years now.    She remains on warfarin for her atrial fibrillation being monitored by the INR clinic.    She has not had any diarrhea concerning for C difficile.    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 LVQ - \"it just makes me sick\"  2. PCN (tolerates CTX and cefepime) - rash > 60 years ago  3. Erythromycin - tolerates azithromycin    Medications:   Current Outpatient Medications:   •  acetaminophen (TYLENOL) 500 MG tablet, 2 daily prn, Disp: , Rfl:   •  atorvastatin (LIPITOR) 40 MG tablet, TAKE 1 TABLET EVERY DAY, Disp: 90 tablet, Rfl: 3  •  B Complex Vitamins (VITAMIN B COMPLEX PO), Take 1 tablet by mouth Daily., Disp: , Rfl:   •  calcium carbonate (OS-EVIN) 600 MG tablet, Take 600 mg by mouth Daily. With vitamin D, Disp: , Rfl:   •  Cefixime (SUPRAX) 400 MG tablet, Take 1 tablet by mouth Daily., " Disp: 7 capsule, Rfl: 0  •  cetirizine (zyrTEC) 10 MG tablet, Take 10 mg by mouth Daily., Disp: , Rfl:   •  Dextromethorphan Polistirex (DELSYM PO), Take 1 dose by mouth 2 (Two) Times a Day As Needed., Disp: , Rfl:   •  ezetimibe (ZETIA) 10 MG tablet, TAKE 1 TABLET EVERY DAY, Disp: 90 tablet, Rfl: 3  •  Fexofenadine HCl (MUCINEX ALLERGY PO), Take 1 tablet by mouth 2 (Two) Times a Day As Needed., Disp: , Rfl:   •  fluticasone (FLONASE) 50 MCG/ACT nasal spray, 2 sprays into each nostril Daily., Disp: , Rfl:   •  fluticasone-salmeterol (ADVAIR HFA) 230-21 MCG/ACT inhaler, Inhale 2 puffs 2 (Two) Times a Day., Disp: , Rfl:   •  glucosamine-chondroitin 500-400 MG capsule capsule, Take 1 capsule by mouth Daily As Needed., Disp: , Rfl:   •  ipratropium-albuterol (DUO-NEB) 0.5-2.5 mg/3 ml nebulizer, Take 3 mL by nebulization 2 (Two) Times a Day., Disp: , Rfl:   •  Lactobacillus (FLORAJEN ACIDOPHILUS) capsule, Take 1 tablet by mouth Daily., Disp: , Rfl:   •  LITTLE NOSES SALINE NASAL MIST aerosol solution, 1 spray into the nostril(s) as directed by provider 2 (Two) Times a Day As Needed., Disp: , Rfl:   •  O2 (OXYGEN), Inhale 2 L/min Every Night., Disp: , Rfl:   •  potassium chloride (K-DUR,KLOR-CON) 10 MEQ CR tablet, TAKE 1 TABLET EVERY DAY, Disp: 90 tablet, Rfl: 3  •  sodium chloride 7 % nebulizer solution nebulizer solution, Take 4 mL by nebulization 2 (Two) Times a Day., Disp: , Rfl:   •  torsemide (DEMADEX) 20 MG tablet, TAKE 1 TABLET TWICE DAILY, Disp: 180 tablet, Rfl: 3  •  VENTOLIN  (90 Base) MCG/ACT inhaler, INL 2 PFS PO Q 4 H PRN, Disp: , Rfl: 5  •  verapamil PM (VERELAN PM) 360 MG 24 hr capsule, Take 1 capsule by mouth Every Night., Disp: 90 capsule, Rfl: 3  •  warfarin (COUMADIN) 3 MG tablet, TAKE 1& 1/2 TABLETS BY MOUTH DAILY OR AS DIRECTED, Disp: 135 tablet, Rfl: 0    OBJECTIVE:    General: awake, alert, NAD  Head: Normocephalic  Eyes: no scleral icterus  ENT: MMM, no thrush  Cardiovascular: NR, no LE  edema  Respiratory: good air movement, LLL rales; no wheezing  GI: Abdomen is non-distended  Musculoskeletal: normal musculature  Neurological: Alert and oriented x 3, motor strength 5/5 in all four extremities  Psychiatric: Normal mood and affect       DIAGNOSTICS:  CBC, CMP, INR, micro reviewed today  Lab Results   Component Value Date    WBC 9.52 07/24/2018    HGB 12.7 07/24/2018    HCT 40.1 07/24/2018     07/24/2018     Lab Results   Component Value Date    GLUCOSE 102 (H) 11/27/2018    BUN 23 11/27/2018    CREATININE 0.90 01/04/2019    EGFRIFNONA 61 11/27/2018    EGFRIFAFRI 96 06/20/2017    BCR 26.4 (H) 11/27/2018    CO2 29.8 (H) 11/27/2018    CALCIUM 9.4 11/27/2018    ALBUMIN 4.50 07/24/2018    AST 24 07/24/2018    ALT 20 07/24/2018     Lab Results   Component Value Date    INR 2.9 (H) 05/29/2019    INR 2.3 (H) 04/29/2019    INR 2.7 (H) 04/01/2019    PROTIME 34.8 (H) 05/29/2019    PROTIME 27.1 (H) 04/29/2019    PROTIME 32.3 (H) 04/01/2019     Microbiology:  11/12/2016: Bronch Cx + MAC  5/31/17 Sputum Cx: normal fortino  6/3/17 bronch culture: mixed fortino  6/3/17 bronch AFB: no AFB at 6 weeks  6/3/17 bronch fungal: rare fungal elements on gram stain  11/27/18 AFB Cx: negative  3/14-16/19: AFB Cx: negative x 3      Radiology (personally reviewed report):   CT chest 11/30/18: complete interval clearing of bilateral airspace infiltrates    ASSESSMENT/PLAN:  1. 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  -she completed 12 months of azithromycin 500 mg PO qMWF, ethambutol 1200 mg PO qMWF, and rifampin 600 mg PO as of 11/28/2018; a repeat sputum AFB was negative in November at the end of therapy and her CT chest showed interval clearing of airspace opacities  -asked her to call me if any worsening cough, SOA, fever, weight loss, sweats; I told her she would likely always has some cough and will be at higher risk for infection than the general population due to her underlying  bronchiectasis but she has no current evidence of MAC a this time  -continue airway clearance mechanisms of inhalers and vest therapy per Dr Tucker's recommendations    2. History of C difficile colitis  -no current symptoms of C difficile  -continue probiotic    3. Chronic atrial fibrillation on coumadin  -warfarin is being adjusted by cardiology INR clinic; INR was 2.9 just 2 days ago  -she is no longer on rifampin    RTC as needed

## 2019-07-01 ENCOUNTER — ANTICOAGULATION VISIT (OUTPATIENT)
Dept: PHARMACY | Facility: HOSPITAL | Age: 84
End: 2019-07-01

## 2019-07-01 DIAGNOSIS — I48.19 PERSISTENT ATRIAL FIBRILLATION (HCC): ICD-10-CM

## 2019-07-01 LAB
INR PPP: 3.8 (ref 0.91–1.09)
PROTHROMBIN TIME: 45.1 SECONDS (ref 10–13.8)

## 2019-07-01 PROCEDURE — 85610 PROTHROMBIN TIME: CPT

## 2019-07-01 PROCEDURE — 36416 COLLJ CAPILLARY BLOOD SPEC: CPT

## 2019-07-01 PROCEDURE — G0463 HOSPITAL OUTPT CLINIC VISIT: HCPCS

## 2019-07-01 NOTE — PROGRESS NOTES
Anticoagulation Clinic Progress Note    Anticoagulation Summary  As of 7/1/2019    INR goal:   2.0-3.0   TTR:   71.0 % (9.7 mo)   INR used for dosing:   3.8! (7/1/2019)   Warfarin maintenance plan:   3 mg every Mon; 4.5 mg all other days   Weekly warfarin total:   30 mg   Plan last modified:   Lina Morris RPH (1/28/2019)   Next INR check:   7/15/2019   Priority:   Maintenance   Target end date:       Indications    Persistent atrial fibrillation (CMS/HCC) [I48.1]             Anticoagulation Episode Summary     INR check location:       Preferred lab:       Send INR reminders to:   [facility]    Comments:         Anticoagulation Care Providers     Provider Role Specialty Phone number    Marcie Robbins MD Referring Cardiology [phone]          Clinic Interview:  Patient Findings     Positives:   Change in diet/appetite, Other complaints    Negatives:   Signs/symptoms of thrombosis, Signs/symptoms of bleeding,   Laboratory test error suspected, Change in health, Change in alcohol use,   Change in activity, Upcoming invasive procedure, Emergency department   visit, Upcoming dental procedure, Missed doses, Extra doses, Change in   medications, Hospital admission, Bruising    Comments:   Decreased vit k while on trip (x 1 wk; returned 6/26).   Reports skin tears easily, but very little bleeding. Some looser stools   due to high fruit consumption last few days.       Clinical Outcomes     Negatives:   Major bleeding event, Thromboembolic event,   Anticoagulation-related hospital admission, Anticoagulation-related ED   visit, Anticoagulation-related fatality    Comments:   Decreased vit k while on trip (x 1 wk; returned 6/26).   Reports skin tears easily, but very little bleeding. Some looser stools   due to high fruit consumption last few days.         INR History:  Anticoagulation Monitoring 4/29/2019 5/29/2019 7/1/2019   INR 2.3 2.9 3.8   INR Date 4/29/2019 5/29/2019 7/1/2019   INR Goal  2.0-3.0 2.0-3.0 2.0-3.0   Trend Same Same Same   Last Week Total 30 mg 30 mg 30 mg   Next Week Total 30 mg 30 mg 27 mg   Sun 4.5 mg 4.5 mg 4.5 mg   Mon 3 mg 3 mg Hold (7/1); Otherwise 3 mg   Tue 4.5 mg 4.5 mg 4.5 mg   Wed 4.5 mg 4.5 mg 4.5 mg   Thu 4.5 mg 4.5 mg 4.5 mg   Fri 4.5 mg 4.5 mg 4.5 mg   Sat 4.5 mg 4.5 mg 4.5 mg   Visit Report - - -   Some recent data might be hidden       Plan:  1. INR is Supratherapeutic today- see above in Anticoagulation Summary.  Will instruct Agnieszka Rizzo to Change their warfarin regimen- see above in Anticoagulation Summary.  2. Follow up in 2 weeks  3. Patient declines warfarin refills.  4. Verbal and written information provided. Patient expresses understanding and has no further questions at this time.    Lev Mcekon [facility]

## 2019-07-12 RX ORDER — TORSEMIDE 20 MG/1
TABLET ORAL
Qty: 180 TABLET | Refills: 3 | Status: SHIPPED | OUTPATIENT
Start: 2019-07-12 | End: 2019-12-16

## 2019-07-15 ENCOUNTER — APPOINTMENT (OUTPATIENT)
Dept: PHARMACY | Facility: HOSPITAL | Age: 84
End: 2019-07-15

## 2019-07-15 ENCOUNTER — ANTICOAGULATION VISIT (OUTPATIENT)
Dept: PHARMACY | Facility: HOSPITAL | Age: 84
End: 2019-07-15

## 2019-07-15 DIAGNOSIS — I48.19 PERSISTENT ATRIAL FIBRILLATION (HCC): ICD-10-CM

## 2019-07-15 LAB
INR PPP: 2.5 (ref 0.91–1.09)
PROTHROMBIN TIME: 29.8 SECONDS (ref 10–13.8)

## 2019-07-15 PROCEDURE — 36416 COLLJ CAPILLARY BLOOD SPEC: CPT

## 2019-07-15 PROCEDURE — 85610 PROTHROMBIN TIME: CPT

## 2019-07-15 NOTE — PROGRESS NOTES
Anticoagulation Clinic Progress Note    Anticoagulation Summary  As of 7/15/2019    INR goal:   2.0-3.0   TTR:   69.5 % (10.1 mo)   INR used for dosin.5 (7/15/2019)   Warfarin maintenance plan:   3 mg every Mon; 4.5 mg all other days   Weekly warfarin total:   30 mg   No change documented:   Johana Schaefer, Pharmacy Intern   Plan last modified:   Lina Morris RPH (2019)   Next INR check:   2019   Priority:   Maintenance   Target end date:       Indications    Persistent atrial fibrillation (CMS/HCC) [I48.1]             Anticoagulation Episode Summary     INR check location:       Preferred lab:       [facility] [place]    Comments:         Anticoagulation Care Providers     Provider Role Specialty Phone number    Marcie Robbins MD Referring Cardiology [phone]          Clinic Interview:  Patient Findings     Positives:   Bruising    Negatives:   Signs/symptoms of thrombosis, Signs/symptoms of bleeding,   Laboratory test error suspected, Change in health, Change in alcohol use,   Change in activity, Upcoming invasive procedure, Emergency department   visit, Upcoming dental procedure, Missed doses, Extra doses, Change in   medications, Change in diet/appetite, Hospital admission, Other complaints      Comments:   7/15: patient reports bruising more easily      Clinical Outcomes     Negatives:   Major bleeding event, Thromboembolic event,   Anticoagulation-related hospital admission, Anticoagulation-related ED   visit, Anticoagulation-related fatality    Comments:   7/15: patient reports bruising more easily        INR History:  Anticoagulation Monitoring 2019 2019 7/15/2019   INR 2.9 3.8 2.5   INR Date 2019 2019 7/15/2019   INR Goal 2.0-3.0 2.0-3.0 2.0-3.0   Trend Same Same Same   Last Week Total 30 mg 30 mg 30 mg   Next Week Total 30 mg 27 mg 30 mg   Sun 4.5 mg 4.5 mg 4.5 mg   Mon 3 mg Hold (); Otherwise 3 mg 3 mg   Tue 4.5 mg 4.5 mg 4.5  mg   Wed 4.5 mg 4.5 mg 4.5 mg   Thu 4.5 mg 4.5 mg 4.5 mg   Fri 4.5 mg 4.5 mg 4.5 mg   Sat 4.5 mg 4.5 mg 4.5 mg   Visit Report - - -   Some recent data might be hidden       Plan:  1. INR is therapeutic today- see above in Anticoagulation Summary.   Will instruct Agnieszka Rizzo to continue their warfarin regimen- see above in Anticoagulation Summary.  2. Follow up in 4 weeks.  3. Patient declines warfarin refills.  4. Verbal and written information provided. Patient expresses understanding and has no further questions at this time.    Johana Schaefer, Pharmacy Intern   ADDENDUM:  I have supervised and reviewed the notes, assessments, and/or procedures performed by Johana Schaefer, PharmD Candidate. I concur with her documentation of this patient.  Carrie Dumont, PharmD, BCACP

## 2019-07-25 ENCOUNTER — APPOINTMENT (OUTPATIENT)
Dept: GENERAL RADIOLOGY | Facility: HOSPITAL | Age: 84
End: 2019-07-25

## 2019-07-25 PROCEDURE — 71046 X-RAY EXAM CHEST 2 VIEWS: CPT | Performed by: EMERGENCY MEDICINE

## 2019-08-09 RX ORDER — WARFARIN SODIUM 3 MG/1
TABLET ORAL
Qty: 135 TABLET | Refills: 0 | Status: SHIPPED | OUTPATIENT
Start: 2019-08-09 | End: 2019-11-18 | Stop reason: SDUPTHER

## 2019-08-12 ENCOUNTER — ANTICOAGULATION VISIT (OUTPATIENT)
Dept: PHARMACY | Facility: HOSPITAL | Age: 84
End: 2019-08-12

## 2019-08-12 DIAGNOSIS — I48.19 PERSISTENT ATRIAL FIBRILLATION (HCC): ICD-10-CM

## 2019-08-12 LAB
INR PPP: 4.4 (ref 0.91–1.09)
PROTHROMBIN TIME: 52.7 SECONDS (ref 10–13.8)

## 2019-08-12 PROCEDURE — 36416 COLLJ CAPILLARY BLOOD SPEC: CPT

## 2019-08-12 PROCEDURE — 85610 PROTHROMBIN TIME: CPT

## 2019-08-12 PROCEDURE — G0463 HOSPITAL OUTPT CLINIC VISIT: HCPCS

## 2019-08-12 NOTE — PATIENT INSTRUCTIONS
HOLD today, partial dose of 3 mg tomorrow (rather than 4.5 mg), then decrease to 3 mg MF, 4.5 mg rest of wk.

## 2019-08-12 NOTE — PROGRESS NOTES
Anticoagulation Clinic Progress Note    Anticoagulation Summary  As of 2019    INR goal:   2.0-3.0   TTR:   65.9 % (11.1 mo)   INR used for dosin.4! (2019)   Warfarin maintenance plan:   3 mg every Mon, Fri; 4.5 mg all other days   Weekly warfarin total:   28.5 mg   Plan last modified:   Lev Mckeon RPH (2019)   Next INR check:   2019   Priority:   Maintenance   Target end date:       Indications    Persistent atrial fibrillation (CMS/HCC) [I48.1]             Anticoagulation Episode Summary     INR check location:       Preferred lab:       Send INR reminders to:    [facility] [place]    Comments:         Anticoagulation Care Providers     Provider Role Specialty Phone number    Marcie Robbins MD Referring Cardiology [phone]          Clinic Interview:  Patient Findings     Positives:   Change in medications    Negatives:   Signs/symptoms of thrombosis, Signs/symptoms of bleeding,   Laboratory test error suspected, Change in health, Change in alcohol use,   Change in activity, Upcoming invasive procedure, Emergency department   visit, Upcoming dental procedure, Missed doses, Extra doses, Change in   diet/appetite, Hospital admission, Bruising, Other complaints    Comments:   Eye vitamin replaced women's MVI 1 wk ago (decrease in vit   k?)      Clinical Outcomes     Negatives:   Major bleeding event, Thromboembolic event,   Anticoagulation-related hospital admission, Anticoagulation-related ED   visit, Anticoagulation-related fatality    Comments:   Eye vitamin replaced women's MVI 1 wk ago (decrease in vit   k?)        INR History:  Anticoagulation Monitoring 2019 7/15/2019 2019   INR 3.8 2.5 4.4   INR Date 2019 7/15/2019 2019   INR Goal 2.0-3.0 2.0-3.0 2.0-3.0   Trend Same Same Down   Last Week Total 30 mg 30 mg 30 mg   Next Week Total 27 mg 30 mg 24 mg   Sun 4.5 mg 4.5 mg 4.5 mg   Mon Hold (); Otherwise 3 mg 3 mg Hold ()   Tue 4.5 mg 4.5 mg 3 mg  (8/13)   Wed 4.5 mg 4.5 mg 4.5 mg   Thu 4.5 mg 4.5 mg 4.5 mg   Fri 4.5 mg 4.5 mg 3 mg   Sat 4.5 mg 4.5 mg 4.5 mg   Visit Report - - -   Some recent data might be hidden       Plan:  1. INR is Supratherapeutic today- see above in Anticoagulation Summary.  Will instruct Agnieszka Rizzo to Change their warfarin regimen- see above in Anticoagulation Summary.  2. Follow up in 1 week  3. Patient declines warfarin refills.  4. Verbal and written information provided. Patient expresses understanding and has no further questions at this time.    Lev Mckeon [facility] [place]

## 2019-08-13 ENCOUNTER — OFFICE VISIT (OUTPATIENT)
Dept: CARDIOLOGY | Facility: CLINIC | Age: 84
End: 2019-08-13

## 2019-08-13 VITALS
BODY MASS INDEX: 22.68 KG/M2 | HEART RATE: 62 BPM | HEIGHT: 63 IN | DIASTOLIC BLOOD PRESSURE: 60 MMHG | WEIGHT: 128 LBS | SYSTOLIC BLOOD PRESSURE: 116 MMHG

## 2019-08-13 DIAGNOSIS — E78.2 MIXED HYPERLIPIDEMIA: ICD-10-CM

## 2019-08-13 DIAGNOSIS — I49.3 VENTRICULAR PREMATURE BEATS: ICD-10-CM

## 2019-08-13 DIAGNOSIS — I25.10 CHRONIC CORONARY ARTERY DISEASE: ICD-10-CM

## 2019-08-13 DIAGNOSIS — I10 BENIGN ESSENTIAL HYPERTENSION: Primary | ICD-10-CM

## 2019-08-13 DIAGNOSIS — I50.32 CHRONIC DIASTOLIC CHF (CONGESTIVE HEART FAILURE) (HCC): ICD-10-CM

## 2019-08-13 DIAGNOSIS — I34.0 NON-RHEUMATIC MITRAL REGURGITATION: ICD-10-CM

## 2019-08-13 DIAGNOSIS — I48.19 PERSISTENT ATRIAL FIBRILLATION (HCC): ICD-10-CM

## 2019-08-13 PROCEDURE — 99214 OFFICE O/P EST MOD 30 MIN: CPT | Performed by: INTERNAL MEDICINE

## 2019-08-13 PROCEDURE — 93000 ELECTROCARDIOGRAM COMPLETE: CPT | Performed by: INTERNAL MEDICINE

## 2019-08-13 NOTE — PROGRESS NOTES
PATIENTINFORMATION    Date of Office Visit: 2019  Encounter Provider: Marcie Robbins MD  Place of Service: [facility] CARDIOLOGY  Patient Name: Agnieszka Rizzo  : 1930    Subjective:     Encounter Date:2019      Patient ID: Agnieszka Rizzo is a 89 y.o. female.      History of Present Illness    This is a lady I met while she was hospitalized in 2016. She has a significant pulmonary history and was having a bronchoscopy and unfortunately developed a pneumothorax. She was found to be in rate -controlled atrial fibrillation. She had previously been seen by Dr. Ana Goodrich for history of hypertension, hyperlipidemia, mild mitral valve prolapse and nonobstructive coronary artery disease diagnosed by heart catheterization in .      Echocardiogram November 15, 2016:  All left ventricular wall segments contract normally.  Left ventricular function is normal. Estimated EF = 58%.  Left atrial cavity size is moderately dilated.  Mild tricuspid valve regurgitation is present.  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 They felt she would do well on oral anticoagulation and I started her on Pradaxa. I did not do a stress test while she was hospitalized because she was wheezing and I did not fill comfortable giving her Lexiscan at that time and I did not when he is dobutamine because of her atrial fibrillation.       She was able to have a stress as an outpt on 16 and this was normal. She continued to complain of dyspnea on exertion and so I had her set up for a cardioversion. She had a couple of hospitalizations in the meantime for respiratory issues. While she was in the hospital in 2017, I attempted to cardiovert her. I shocked her 3 times that she did not remain in sinus rhythm. I spoke with her family and we decided to  continue with rate control and anticoagulation.      She was hospitalized in 02/2017. She had started Voriconazole for treatment of pulmonary aspergillosis by Dr. Tucker. She became very nauseous and sick, and threw up for an entire day. She came into the hospital confused and weak. Her sodium was at 109. She was found to have a left clavicle fracture from a fall. She was discharged to [facility], where she has been. Unfortunately, as a result of the treatment for the aspergillosis, she developed C. difficile and was rehospitalized for treatment of this in March 2017. As a part of his treatment she did receive IV fluids. She was discharged but then I readmitted her on March 22 for IV diuresis. She was volume overloaded and in diastolic heart failure. She was back in the hospital on April 2 with more C. difficile colitis. Again she was treated with IV fluids and became volume overloaded. I diuresed her in the hospital and she was only mildly volume overloaded at discharge.     She was hospitalized from May 31 of June 9.  While there she had some rapid ventricular response due to her atrial fibrillation and being sick with community-acquired pneumonia.  Some changes were made to her medication but in the end she was discharged on verapamil 360 mg once a day and digoxin 0.125 mg a day.     She has done great and has been out of the hospital since 2017.  She has maintained sinus rhythm.  She is still on anticoagulation without issues.  She is walks three days a week; 1.5 miles in a 35 minute time period.  She works to keep her heart rate under 100 beats per minute.  She denies palpitations, shortness of breath, edema, lightheadedness, or chest pain.        Review of Systems   Constitution: Positive for malaise/fatigue. Negative for fever, weight gain and weight loss.   HENT: Negative for ear pain, hearing loss, nosebleeds and sore throat.    Eyes: Negative for double vision, pain, vision loss in left eye and vision  "loss in right eye.   Cardiovascular:        See history of present illness.   Respiratory: Negative for cough, shortness of breath, sleep disturbances due to breathing, snoring and wheezing.    Endocrine: Negative for cold intolerance, heat intolerance and polyuria.   Skin: Positive for color change. Negative for itching, poor wound healing and rash.   Musculoskeletal: Negative for joint pain, joint swelling and myalgias.   Gastrointestinal: Negative for abdominal pain, diarrhea, hematochezia, nausea and vomiting.   Genitourinary: Negative for hematuria and hesitancy.   Neurological: Negative for numbness, paresthesias and seizures.   Psychiatric/Behavioral: Negative for depression. The patient is not nervous/anxious.            ECG 12 Lead  Date/Time: 8/13/2019 1:40 PM  Performed by: Marcie Robbins MD  Authorized by: Marcie Robbins MD   Comparison: compared with previous ECG from 12/13/2018  Similar to previous ECG  Rhythm: sinus rhythm  BPM: 62  Conduction: conduction normal  ST Segments: ST segments normal  T Waves: T waves normal    Clinical impression: normal ECG               Objective:     /60   Pulse 62   Ht 160 cm (63\")   Wt 58.1 kg (128 lb)   BMI 22.67 kg/m²  Body mass index is 22.67 kg/m².     Physical Exam   Constitutional: She appears well-developed.   HENT:   Head: Normocephalic and atraumatic.   Eyes: Conjunctivae and lids are normal. Pupils are equal, round, and reactive to light. Lids are everted and swept, no foreign bodies found.   Neck: Normal range of motion. No JVD present. Carotid bruit is not present. No tracheal deviation present. No thyroid mass present.   Cardiovascular: Normal rate, regular rhythm and normal heart sounds.   Pulses:       Dorsalis pedis pulses are 2+ on the right side, and 2+ on the left side.   Pulmonary/Chest: Effort normal and breath sounds normal.   Abdominal: Normal appearance and bowel sounds are normal.   Musculoskeletal: Normal range of motion. "   Neurological: She is alert. She has normal strength.   Skin: Skin is warm, dry and intact.   Psychiatric: She has a normal mood and affect. Her behavior is normal.   Vitals reviewed.          Assessment/Plan:       1. Atrial fibrillation. She failed cardioversion in January 2017. She has a CHADS2-Vasc score of 5. She is anticoagulated with warfarin.   she has been in sinus rhythm.  Most of her rhythm problems occurred while she was hospitalized and ill.      Atrial Fibrillation and Atrial Flutter  Assessment  • The patient has persistent atrial fibrillation  • This is non-valvular in etiology  • The patient's CHADS2-VASc score is 5  • A [id number] score of 2 or more is considered a high risk for a thromboembolic event    Plan  • Attempt to maintain sinus rhythm  • Continue warfarin for antithrombotic therapy, bleeding issues discussed  • Continue verapamil for rhythm control       2. Chronic diastolic heart failure. She is on torsemide.      3. Dyspnea on exertion. Multifactorial.  Much improved.     4. Mitral valve prolapse with very mild mitral regurgitation.     5. Mild tricuspid regurgitation without pulmonary hypertension.     6. Nonobstructive coronary artery disease diagnosed by catheter in 2007. She has noted coronary artery calcification on CT scans. Nuclear stress 12/16 was normal.  he has had some chest discomfort so I am point to repeat the stress test.     7. Hypertension. Her blood pressure is controlled.      8. Hyperlipidemia. Zetia and atorvastatin.     9. Hyponatremia. Stable.     10. C. difficile diarrhea. 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  She has completed her antibiotic regimen.  She has a follow-up appointment with Dr. Tucker early next year    Overall she is doing great.  I will see her back in a year unless she has problems sooner       Future Appointments       Provider Department Center    8/19/2019 9:30 AM ANTI  [facility]     11/14/2019 9:00 AM Matt Rose MD [facility]           No orders of the defined types were placed in this encounter.       Discharge Medications           Accurate as of 8/13/19 11:22 AM. If you have any questions, ask your nurse or doctor.               Continue These Medications      Instructions Start Date   acetaminophen 500 MG tablet  Commonly known as:  TYLENOL   2 daily prn      atorvastatin 40 MG tablet  Commonly known as:  LIPITOR   TAKE 1 TABLET EVERY DAY      calcium carbonate 600 MG tablet  Commonly known as:  OS-EVIN   600 mg, Oral, Daily, With vitamin D      cetirizine 10 MG tablet  Commonly known as:  zyrTEC   10 mg, Oral, Daily      DELSYM PO   1 dose, Oral, 2 Times Daily PRN      ezetimibe 10 MG tablet  Commonly known as:  ZETIA   TAKE 1 TABLET EVERY DAY      FLORAJEN ACIDOPHILUS capsule   1 tablet, Oral, Daily      fluticasone 50 MCG/ACT nasal spray  Commonly known as:  FLONASE   2 sprays, Nasal, Daily      fluticasone-salmeterol 230-21 MCG/ACT inhaler  Commonly known as:  ADVAIR HFA   2 puffs, Inhalation, 2 Times Daily - RT      glucosamine-chondroitin 500-400 MG capsule capsule   1 capsule, Oral, Daily PRN      ipratropium-albuterol 0.5-2.5 mg/3 ml nebulizer  Commonly known as:  DUO-NEB   3 mL, Nebulization, 2 Times Daily      LITTLE NOSES SALINE NASAL MIST aerosol solution   1 spray, Nasal, 2 Times Daily PRN      MUCINEX ALLERGY PO   1 tablet, Oral, 2 Times Daily PRN      fexofenadine 180 MG tablet  Commonly known as:  ALLEGRA ALLERGY   180 mg, Oral, Daily      O2  Commonly known as:  OXYGEN   2 L/min, Inhalation, Nightly      potassium chloride 10 MEQ CR tablet  Commonly known as:  K-DUR,KLOR-CON   TAKE 1 TABLET EVERY DAY      sodium chloride 7 % nebulizer solution nebulizer solution   4 mL, Nebulization, 2 Times Daily      torsemide 20 MG tablet  Commonly known as:  DEMADEX    TAKE 1 TABLET TWICE DAILY      VENTOLIN  (90 Base) MCG/ACT inhaler  Generic drug:  albuterol sulfate HFA   INL 2 PFS PO Q 4 H PRN      verapamil  MG 24 hr capsule  Commonly known as:  VERELAN PM   360 mg, Oral, Nightly      VITAMIN B COMPLEX PO   1 tablet, Oral, Daily      warfarin 3 MG tablet  Commonly known as:  COUMADIN   TAKE 1 1/2 TABLETS BY MOUTH EVERY DAY OR AS DIRECTED         Stop These Medications    Cefixime 400 MG capsule  Commonly known as:  SUPRAX  Stopped by:  MD Marcie Small MD  08/13/19  11:22 AM

## 2019-08-19 ENCOUNTER — ANTICOAGULATION VISIT (OUTPATIENT)
Dept: PHARMACY | Facility: HOSPITAL | Age: 84
End: 2019-08-19

## 2019-08-19 DIAGNOSIS — I48.19 PERSISTENT ATRIAL FIBRILLATION (HCC): ICD-10-CM

## 2019-08-19 LAB
INR PPP: 2.4 (ref 0.91–1.09)
PROTHROMBIN TIME: 28.9 SECONDS (ref 10–13.8)

## 2019-08-19 PROCEDURE — 36416 COLLJ CAPILLARY BLOOD SPEC: CPT

## 2019-08-19 PROCEDURE — 85610 PROTHROMBIN TIME: CPT

## 2019-08-19 NOTE — PROGRESS NOTES
Anticoagulation Clinic Progress Note    Anticoagulation Summary  As of 2019    INR goal:   2.0-3.0   TTR:   65.1 % (11.3 mo)   INR used for dosin.4 (2019)   Warfarin maintenance plan:   3 mg every Mon, Fri; 4.5 mg all other days   Weekly warfarin total:   28.5 mg   No change documented:   Reema Dorantes   Plan last modified:   Lev Mckeon RPH (2019)   Next INR check:   9/3/2019   Priority:   Maintenance   Target end date:       Indications    Persistent atrial fibrillation (CMS/HCC) [I48.1]             Anticoagulation Episode Summary     INR check location:       Preferred lab:       Send INR reminders to:    [facility]    Comments:         Anticoagulation Care Providers     Provider Role Specialty Phone number    Marcie Robbins MD Referring Cardiology [phone]          Clinic Interview:  Patient Findings     Negatives:   Signs/symptoms of thrombosis, Signs/symptoms of bleeding,   Laboratory test error suspected, Change in health, Change in alcohol use,   Change in activity, Upcoming invasive procedure, Emergency department   visit, Upcoming dental procedure, Missed doses, Extra doses, Change in   medications, Change in diet/appetite, Hospital admission, Bruising, Other   complaints      Clinical Outcomes     Negatives:   Major bleeding event, Thromboembolic event,   Anticoagulation-related hospital admission, Anticoagulation-related ED   visit, Anticoagulation-related fatality        INR History:  Anticoagulation Monitoring 7/15/2019 2019 2019   INR 2.5 4.4 2.4   INR Date 7/15/2019 2019 2019   INR Goal 2.0-3.0 2.0-3.0 2.0-3.0   Trend Same Down Same   Last Week Total 30 mg 30 mg 24 mg   Next Week Total 30 mg 24 mg 28.5 mg   Sun 4.5 mg 4.5 mg 4.5 mg   Mon 3 mg Hold () 3 mg   Tue 4.5 mg 3 mg () 4.5 mg   Wed 4.5 mg 4.5 mg 4.5 mg   Thu 4.5 mg 4.5 mg 4.5 mg   Fri 4.5 mg 3 mg 3 mg   Sat 4.5 mg 4.5 mg 4.5 mg   Visit Report - - -   Some recent  data might be hidden       Plan:  1. INR is therapeutic today- see above in Anticoagulation Summary.   Will instruct Agnieszka Rizzo to continue their warfarin regimen- see above in Anticoagulation Summary.  2. Follow up in 2 weeks.  3. Patient declines warfarin refills.  4. Verbal and written information provided. Patient expresses understanding and has no further questions at this time.    Reema Dorantes

## 2019-09-03 ENCOUNTER — APPOINTMENT (OUTPATIENT)
Dept: LAB | Facility: HOSPITAL | Age: 84
End: 2019-09-03

## 2019-09-03 ENCOUNTER — ANTICOAGULATION VISIT (OUTPATIENT)
Dept: PHARMACY | Facility: HOSPITAL | Age: 84
End: 2019-09-03

## 2019-09-03 DIAGNOSIS — I48.19 PERSISTENT ATRIAL FIBRILLATION (HCC): ICD-10-CM

## 2019-09-03 LAB
INR PPP: 3.68 (ref 0.9–1.1)
INR PPP: 4.6 (ref 0.91–1.09)
PROTHROMBIN TIME: 36.2 SECONDS (ref 11.7–14.2)
PROTHROMBIN TIME: 54.9 SECONDS (ref 10–13.8)

## 2019-09-03 PROCEDURE — 36416 COLLJ CAPILLARY BLOOD SPEC: CPT

## 2019-09-03 PROCEDURE — 85610 PROTHROMBIN TIME: CPT

## 2019-09-03 PROCEDURE — 36415 COLL VENOUS BLD VENIPUNCTURE: CPT

## 2019-09-03 PROCEDURE — G0463 HOSPITAL OUTPT CLINIC VISIT: HCPCS

## 2019-09-03 NOTE — PROGRESS NOTES
Anticoagulation Clinic Progress Note    Anticoagulation Summary  As of 9/3/2019    INR goal:   2.0-3.0   TTR:   63.5 % (11.8 mo)   INR used for dosing:   3.68! (9/3/2019)   Warfarin maintenance plan:   3 mg every Mon, Fri; 4.5 mg all other days   Weekly warfarin total:   28.5 mg   Plan last modified:   Lev Mckeon RP (8/12/2019)   Next INR check:   9/12/2019   Priority:   Maintenance   Target end date:       Indications    Persistent atrial fibrillation (CMS/HCC) [I48.1]             Anticoagulation Episode Summary     INR check location:       Preferred lab:       Send INR reminders to:    [facility] [place]    Comments:         Anticoagulation Care Providers     Provider Role Specialty Phone number    Marcie Robbins MD Referring Cardiology [phone]          Clinic Interview:  Patient Findings     Positives:   Change in alcohol use, Change in diet/appetite    Negatives:   Signs/symptoms of thrombosis, Signs/symptoms of bleeding,   Laboratory test error suspected, Change in health, Change in activity,   Upcoming invasive procedure, Emergency department visit, Upcoming dental   procedure, Missed doses, Extra doses, Change in medications, Hospital   admission, Bruising, Other complaints    Comments:   Large amt of EtOH 3 days ago. Decreased vit k (none   recently).      Clinical Outcomes     Negatives:   Major bleeding event, Thromboembolic event,   Anticoagulation-related hospital admission, Anticoagulation-related ED   visit, Anticoagulation-related fatality    Comments:   Large amt of EtOH 3 days ago. Decreased vit k (none   recently).        INR History:  Anticoagulation Monitoring 8/12/2019 8/19/2019 9/3/2019   INR 4.4 2.4 3.68   INR Date 8/12/2019 8/19/2019 9/3/2019   INR Goal 2.0-3.0 2.0-3.0 2.0-3.0   Trend Down Same Same   Last Week Total 30 mg 24 mg 28.5 mg   Next Week Total 24 mg 28.5 mg 24 mg   Sun 4.5 mg 4.5 mg 4.5 mg   Mon Hold (8/12) 3 mg 3 mg   Tue 3 mg (8/13) 4.5 mg Hold (9/3);  Otherwise 4.5 mg   Wed 4.5 mg 4.5 mg 4.5 mg   Thu 4.5 mg 4.5 mg 4.5 mg   Fri 3 mg 3 mg 3 mg   Sat 4.5 mg 4.5 mg 4.5 mg   Visit Report - - -   Some recent data might be hidden       Plan:  1. INR is Supratherapeutic today (POC 4.6; venous 3.68) - see above in Anticoagulation Summary.  Will instruct Agnieszka Rizzo to Change their warfarin regimen- see above in Anticoagulation Summary.  2. Follow up in 2 weeks  3. Patient declines warfarin refills.  4. Verbal and written information provided. Patient expresses understanding and has no further questions at this time.    Lev Mckeon [facility]

## 2019-09-16 ENCOUNTER — APPOINTMENT (OUTPATIENT)
Dept: LAB | Facility: HOSPITAL | Age: 84
End: 2019-09-16

## 2019-09-16 ENCOUNTER — ANTICOAGULATION VISIT (OUTPATIENT)
Dept: PHARMACY | Facility: HOSPITAL | Age: 84
End: 2019-09-16

## 2019-09-16 DIAGNOSIS — I48.19 PERSISTENT ATRIAL FIBRILLATION (HCC): ICD-10-CM

## 2019-09-16 LAB
INR PPP: 3.7 (ref 0.9–1.1)
INR PPP: 4.8 (ref 0.91–1.09)
PROTHROMBIN TIME: 36.5 SECONDS (ref 11.7–14.2)
PROTHROMBIN TIME: 57.2 SECONDS (ref 10–13.8)

## 2019-09-16 PROCEDURE — G0463 HOSPITAL OUTPT CLINIC VISIT: HCPCS

## 2019-09-16 PROCEDURE — 36416 COLLJ CAPILLARY BLOOD SPEC: CPT

## 2019-09-16 PROCEDURE — 36415 COLL VENOUS BLD VENIPUNCTURE: CPT

## 2019-09-16 PROCEDURE — 85610 PROTHROMBIN TIME: CPT

## 2019-09-16 NOTE — PROGRESS NOTES
Anticoagulation Clinic Progress Note    Anticoagulation Summary  As of 9/16/2019    INR goal:   2.0-3.0   TTR:   61.3 % (1 y)   INR used for dosing:   3.70! (9/16/2019)   Warfarin maintenance plan:   3 mg every Mon, Wed, Fri; 4.5 mg all other days   Weekly warfarin total:   27 mg   Plan last modified:   Lina Morris RPH (9/16/2019)   Next INR check:   9/23/2019   Priority:   Maintenance   Target end date:       Indications    Persistent atrial fibrillation (CMS/HCC) [I48.1]             Anticoagulation Episode Summary     INR check location:       Preferred lab:       Send INR reminders to:    [facility] [place]    Comments:         Anticoagulation Care Providers     Provider Role Specialty Phone number    Marcie Robbins MD Referring Cardiology [phone]          Clinic Interview:      INR History:  Anticoagulation Monitoring 8/19/2019 9/3/2019 9/16/2019   INR 2.4 3.68 3.70   INR Date 8/19/2019 9/3/2019 9/16/2019   INR Goal 2.0-3.0 2.0-3.0 2.0-3.0   Trend Same Same Down   Last Week Total 24 mg 28.5 mg 28.5 mg   Next Week Total 28.5 mg 24 mg 22.5 mg   Sun 4.5 mg 4.5 mg 4.5 mg   Mon 3 mg 3 mg Hold (9/16)   Tue 4.5 mg Hold (9/3); Otherwise 4.5 mg 3 mg (9/17)   Wed 4.5 mg 4.5 mg 3 mg   Thu 4.5 mg 4.5 mg 4.5 mg   Fri 3 mg 3 mg 3 mg   Sat 4.5 mg 4.5 mg 4.5 mg   Visit Report - - -   Some recent data might be hidden       Plan:  1. INR is Supratherapeutic today- see above in Anticoagulation Summary.  Will instruct Agnieszka Rizzo to HOLD warfarin today, then Change their warfarin regimen- see above in Anticoagulation Summary.  2. Follow up in 1 week  3. Patient declines warfarin refills.  4. Verbal and written information provided. Patient expresses understanding and has no further questions at this time.    Lina Morris RPH

## 2019-09-23 ENCOUNTER — LAB (OUTPATIENT)
Dept: LAB | Facility: HOSPITAL | Age: 84
End: 2019-09-23

## 2019-09-23 ENCOUNTER — ANTICOAGULATION VISIT (OUTPATIENT)
Dept: PHARMACY | Facility: HOSPITAL | Age: 84
End: 2019-09-23

## 2019-09-23 ENCOUNTER — OFFICE VISIT (OUTPATIENT)
Dept: INTERNAL MEDICINE | Facility: CLINIC | Age: 84
End: 2019-09-23

## 2019-09-23 VITALS
SYSTOLIC BLOOD PRESSURE: 108 MMHG | HEART RATE: 75 BPM | DIASTOLIC BLOOD PRESSURE: 46 MMHG | TEMPERATURE: 98.5 F | BODY MASS INDEX: 23.31 KG/M2 | OXYGEN SATURATION: 95 % | WEIGHT: 131.6 LBS

## 2019-09-23 DIAGNOSIS — D50.9 IRON DEFICIENCY ANEMIA, UNSPECIFIED IRON DEFICIENCY ANEMIA TYPE: ICD-10-CM

## 2019-09-23 DIAGNOSIS — I48.19 PERSISTENT ATRIAL FIBRILLATION (HCC): ICD-10-CM

## 2019-09-23 DIAGNOSIS — I50.32 CHRONIC DIASTOLIC HEART FAILURE (HCC): Primary | ICD-10-CM

## 2019-09-23 DIAGNOSIS — I50.32 CHRONIC DIASTOLIC CONGESTIVE HEART FAILURE (HCC): Primary | ICD-10-CM

## 2019-09-23 DIAGNOSIS — R10.9 ABDOMINAL PAIN, UNSPECIFIED ABDOMINAL LOCATION: ICD-10-CM

## 2019-09-23 LAB
ALBUMIN SERPL-MCNC: 3.8 G/DL (ref 3.5–5.2)
ALBUMIN/GLOB SERPL: 1.1 G/DL
ALP SERPL-CCNC: 92 U/L (ref 39–117)
ALT SERPL W P-5'-P-CCNC: 17 U/L (ref 1–33)
ANION GAP SERPL CALCULATED.3IONS-SCNC: 12.5 MMOL/L (ref 5–15)
AST SERPL-CCNC: 21 U/L (ref 1–32)
BILIRUB BLD-MCNC: NEGATIVE MG/DL
BILIRUB SERPL-MCNC: 0.5 MG/DL (ref 0.2–1.2)
BUN BLD-MCNC: 16 MG/DL (ref 8–23)
BUN/CREAT SERPL: 19.5 (ref 7–25)
CALCIUM SPEC-SCNC: 9.6 MG/DL (ref 8.6–10.5)
CHLORIDE SERPL-SCNC: 95 MMOL/L (ref 98–107)
CLARITY, POC: CLEAR
CO2 SERPL-SCNC: 30.5 MMOL/L (ref 22–29)
COLOR UR: YELLOW
CREAT BLD-MCNC: 0.82 MG/DL (ref 0.57–1)
GFR SERPL CREATININE-BSD FRML MDRD: 66 ML/MIN/1.73
GLOBULIN UR ELPH-MCNC: 3.5 GM/DL
GLUCOSE BLD-MCNC: 89 MG/DL (ref 65–99)
GLUCOSE UR STRIP-MCNC: NEGATIVE MG/DL
INR PPP: 3.6 (ref 0.91–1.09)
KETONES UR QL: NEGATIVE
LEUKOCYTE EST, POC: ABNORMAL
NITRITE UR-MCNC: NEGATIVE MG/ML
PH UR: 7.5 [PH] (ref 5–8)
POTASSIUM BLD-SCNC: 4 MMOL/L (ref 3.5–5.2)
PROT SERPL-MCNC: 7.3 G/DL (ref 6–8.5)
PROT UR STRIP-MCNC: NEGATIVE MG/DL
PROTHROMBIN TIME: 42.8 SECONDS (ref 10–13.8)
RBC # UR STRIP: NEGATIVE /UL
SODIUM BLD-SCNC: 138 MMOL/L (ref 136–145)
SP GR UR: 1.01 (ref 1–1.03)
UROBILINOGEN UR QL: NORMAL

## 2019-09-23 PROCEDURE — 36415 COLL VENOUS BLD VENIPUNCTURE: CPT | Performed by: FAMILY MEDICINE

## 2019-09-23 PROCEDURE — 80053 COMPREHEN METABOLIC PANEL: CPT | Performed by: FAMILY MEDICINE

## 2019-09-23 PROCEDURE — 36416 COLLJ CAPILLARY BLOOD SPEC: CPT

## 2019-09-23 PROCEDURE — 81003 URINALYSIS AUTO W/O SCOPE: CPT | Performed by: FAMILY MEDICINE

## 2019-09-23 PROCEDURE — G0463 HOSPITAL OUTPT CLINIC VISIT: HCPCS

## 2019-09-23 PROCEDURE — 99214 OFFICE O/P EST MOD 30 MIN: CPT | Performed by: FAMILY MEDICINE

## 2019-09-23 PROCEDURE — 85610 PROTHROMBIN TIME: CPT

## 2019-09-23 RX ORDER — VERAPAMIL HYDROCHLORIDE 240 MG/1
240 CAPSULE, EXTENDED RELEASE ORAL NIGHTLY
Qty: 30 CAPSULE | Refills: 6 | Status: SHIPPED | OUTPATIENT
Start: 2019-09-23 | End: 2019-12-16

## 2019-09-23 RX ORDER — LEVOCETIRIZINE DIHYDROCHLORIDE 5 MG/1
5 TABLET, FILM COATED ORAL EVERY EVENING
COMMUNITY
End: 2019-12-16

## 2019-09-23 NOTE — PROGRESS NOTES
Anticoagulation Clinic Progress Note    Anticoagulation Summary  As of 9/23/2019    INR goal:   2.0-3.0   TTR:   60.1 % (1 y)   INR used for dosing:   3.6! (9/23/2019)   Warfarin maintenance plan:   4.5 mg every Sun, Thu; 3 mg all other days   Weekly warfarin total:   24 mg   Plan last modified:   Lev Mckeon RPH (9/23/2019)   Next INR check:   9/30/2019   Priority:   Maintenance   Target end date:       Indications    Persistent atrial fibrillation (CMS/HCC) [I48.1]             Anticoagulation Episode Summary     INR check location:       Preferred lab:       Send INR reminders to:   [facility]    Comments:         Anticoagulation Care Providers     Provider Role Specialty Phone number    Marcie Robbins MD Referring Cardiology [phone]          Clinic Interview:  Patient Findings     Positives:   Change in medications    Negatives:   Signs/symptoms of thrombosis, Signs/symptoms of bleeding,   Laboratory test error suspected, Change in health, Change in alcohol use,   Change in activity, Upcoming invasive procedure, Emergency department   visit, Upcoming dental procedure, Missed doses, Extra doses, Change in   diet/appetite, Hospital admission, Bruising, Other complaints    Comments:   Stopped AREDS vitamin (no vitamin k)      Clinical Outcomes     Negatives:   Major bleeding event, Thromboembolic event,   Anticoagulation-related hospital admission, Anticoagulation-related ED   visit, Anticoagulation-related fatality    Comments:   Stopped AREDS vitamin (no vitamin k)        INR History:  Anticoagulation Monitoring 9/3/2019 9/16/2019 9/23/2019   INR 3.68 3.70 3.6   INR Date 9/3/2019 9/16/2019 9/23/2019   INR Goal 2.0-3.0 2.0-3.0 2.0-3.0   Trend Same Down Down   Last Week Total 28.5 mg 28.5 mg 22.5 mg   Next Week Total 24 mg 22.5 mg 22.5 mg   Sun 4.5 mg 4.5 mg 4.5 mg   Mon 3 mg Hold (9/16) 1.5 mg (9/23)   Tue Hold (9/3); Otherwise 4.5 mg 3 mg (9/17) 3 mg   Wed 4.5 mg 3 mg 3 mg   Thu 4.5 mg  4.5 mg 4.5 mg   Fri 3 mg 3 mg 3 mg   Sat 4.5 mg 4.5 mg 3 mg   Visit Report - - -   Some recent data might be hidden       Plan:  1. INR is Supratherapeutic today- see above in Anticoagulation Summary.  Will instruct Agnieszka Rizzo to Change their warfarin regimen- see above in Anticoagulation Summary.  2. Follow up in 1 week  3. Patient declines warfarin refills.  4. Verbal and written information provided. Patient expresses understanding and has no further questions at this time.    Lev Mckeon [facility]

## 2019-09-23 NOTE — PROGRESS NOTES
Agnieszka Rizzo is a 89 y.o. female.      Assessment/Plan   Problem List Items Addressed This Visit        Cardiovascular and Mediastinum    CHF (congestive heart failure) (CMS/HCC) - Primary    Relevant Medications    verapamil ER (VERELAN) 240 MG 24 hr capsule    Other Relevant Orders    Comprehensive Metabolic Panel    CBC & Differential    CBC Auto Differential       Nervous and Auditory    Abdominal pain    Relevant Orders    Urinalysis With Culture If Indicated -    POCT urinalysis dipstick, automated (Completed)       Hematopoietic and Hemostatic    Iron deficiency anemia         Send urine for culture decrease verapamil from 360 to to 40 mg daily follow-up results of CMP and CBC  Trial of simethicone or Gas-X  Return in about 1 month (around 10/23/2019), or if symptoms worsen or fail to improve, for Recheck, Next scheduled follow up.      Chief Complaint   Patient presents with   • Generalized Body Aches   • Abdominal Pain   • abdominal swelling at night     Social History     Tobacco Use   • Smoking status: Never Smoker   • Smokeless tobacco: Never Used   • Tobacco comment: caffiene daily   Substance Use Topics   • Alcohol use: Yes     Comment: occasional   • Drug use: No       History of Present Illness   Patient appointment for chronic abdominal pain is mostly bloating in nature she has been seen by GI she is had a recent CT scan significant finding of only a solitary gallstone 1 7 mm there is no postprandial nausea or abdominal pain she is mostly concerned about bloating there is no change in her bowel habits no black or bloody stool she does have some increased amount of gas mostly towards the end of the day she is in not had any specific fever her blood pressure is checked occasionally and seems to be trending downward she does not have any irregular fast heart rate although she is stable with her A. Fib  Is not having specific shortness of breath or worsening shortness of breath or cough with laying  flat and there is no lower extremity swelling  Is taking supplemental iron for anemia unknown etiology  The following portions of the patient's history were reviewed and updated as appropriate:PMHroutine: Social history , Allergies, Current Medications, Active Problem List and Health Maintenance    Review of Systems   Constitutional: Negative.    HENT: Negative.    Eyes: Negative.    Respiratory: Negative.    Cardiovascular: Negative.    Endocrine: Negative.    Genitourinary: Negative.    Musculoskeletal: Negative.    Neurological: Negative.    Hematological: Negative.    Psychiatric/Behavioral: Negative.        Objective   Vitals:    09/23/19 1317   BP: 108/46   BP Location: Right arm   Patient Position: Sitting   Cuff Size: Adult   Pulse: 75   Temp: 98.5 °F (36.9 °C)   TempSrc: Oral   SpO2: 95%   Weight: 59.7 kg (131 lb 9.6 oz)     Body mass index is 23.31 kg/m².  Physical Exam   Constitutional: She is oriented to person, place, and time. She appears well-developed and well-nourished.   HENT:   Head: Normocephalic and atraumatic.   Eyes: Conjunctivae are normal. No scleral icterus.   Cardiovascular: An irregular rhythm present.   No murmur heard.  Pulmonary/Chest: Effort normal and breath sounds normal.   Abdominal: Soft. Bowel sounds are normal. She exhibits no distension. There is no tenderness.   Musculoskeletal: She exhibits no edema.   Neurological: She is alert and oriented to person, place, and time.   Skin: No erythema.   Psychiatric: She has a normal mood and affect. Her behavior is normal. Judgment and thought content normal.   Nursing note and vitals reviewed.    Reviewed Data:  Office Visit on 09/23/2019   Component Date Value Ref Range Status   • Color 09/23/2019 Yellow  Yellow, Straw, Dark Yellow, Noemi Final   • Clarity, UA 09/23/2019 Clear  Clear Final   • Specific Gravity  09/23/2019 1.010  1.005 - 1.030 Final   • pH, Urine 09/23/2019 7.5  5.0 - 8.0 Final   • Leukocytes 09/23/2019 Small (1+)*  Negative Final   • Nitrite, UA 09/23/2019 Negative  Negative Final   • Protein, POC 09/23/2019 Negative  Negative mg/dL Final   • Glucose, UA 09/23/2019 Negative  Negative, 1000 mg/dL (3+) mg/dL Final   • Ketones, UA 09/23/2019 Negative  Negative Final   • Urobilinogen, UA 09/23/2019 Normal  Normal Final   • Bilirubin 09/23/2019 Negative  Negative Final   • Blood, UA 09/23/2019 Negative  Negative Final   Anticoagulation Visit on 09/23/2019   Component Date Value Ref Range Status   • Protime 09/23/2019 42.8* 10.0 - 13.8 seconds Final   • INR 09/23/2019 3.6* 0.91 - 1.09 Final   Anticoagulation Visit on 09/16/2019   Component Date Value Ref Range Status   • Protime 09/16/2019 57.2* 10.0 - 13.8 seconds Final   • INR 09/16/2019 4.8* 0.91 - 1.09 Final   • Protime 09/16/2019 36.5* 11.7 - 14.2 Seconds Final   • INR 09/16/2019 3.70* 0.90 - 1.10 Final   Anticoagulation Visit on 09/03/2019   Component Date Value Ref Range Status   • Protime 09/03/2019 54.9* 10.0 - 13.8 seconds Final   • INR 09/03/2019 4.6* 0.91 - 1.09 Final   • Protime 09/03/2019 36.2* 11.7 - 14.2 Seconds Final   • INR 09/03/2019 3.68* 0.90 - 1.10 Final

## 2019-09-24 LAB
BASOPHILS # BLD AUTO: 0.07 10*3/MM3 (ref 0–0.2)
BASOPHILS NFR BLD AUTO: 0.8 % (ref 0–1.5)
DEPRECATED RDW RBC AUTO: 49.9 FL (ref 37–54)
EOSINOPHIL # BLD AUTO: 0.19 10*3/MM3 (ref 0–0.4)
EOSINOPHIL NFR BLD AUTO: 2.2 % (ref 0.3–6.2)
ERYTHROCYTE [DISTWIDTH] IN BLOOD BY AUTOMATED COUNT: 14 % (ref 12.3–15.4)
GLUCOSE UR QL: NORMAL
HCT VFR BLD AUTO: 34.3 % (ref 34–46.6)
HGB BLD-MCNC: 10.9 G/DL (ref 12–15.9)
IMM GRANULOCYTES # BLD AUTO: 0.05 10*3/MM3 (ref 0–0.05)
IMM GRANULOCYTES NFR BLD AUTO: 0.6 % (ref 0–0.5)
KETONES UR QL STRIP: NORMAL
LYMPHOCYTES # BLD AUTO: 1.86 10*3/MM3 (ref 0.7–3.1)
LYMPHOCYTES NFR BLD AUTO: 21.3 % (ref 19.6–45.3)
MCH RBC QN AUTO: 30.9 PG (ref 26.6–33)
MCHC RBC AUTO-ENTMCNC: 31.8 G/DL (ref 31.5–35.7)
MCV RBC AUTO: 97.2 FL (ref 79–97)
MONOCYTES # BLD AUTO: 0.66 10*3/MM3 (ref 0.1–0.9)
MONOCYTES NFR BLD AUTO: 7.5 % (ref 5–12)
NEUTROPHILS # BLD AUTO: 5.92 10*3/MM3 (ref 1.7–7)
NEUTROPHILS NFR BLD AUTO: 67.6 % (ref 42.7–76)
NRBC BLD AUTO-RTO: 0.1 /100 WBC (ref 0–0.2)
PH UR STRIP: NORMAL [PH]
PLATELET # BLD AUTO: 305 10*3/MM3 (ref 140–450)
PMV BLD AUTO: 10.3 FL (ref 6–12)
PROT UR QL STRIP: NORMAL
RBC # BLD AUTO: 3.53 10*6/MM3 (ref 3.77–5.28)
SP GR UR: NORMAL
SPECIMEN STATUS: NORMAL
WBC NRBC COR # BLD: 8.75 10*3/MM3 (ref 3.4–10.8)

## 2019-09-24 PROCEDURE — 85025 COMPLETE CBC W/AUTO DIFF WBC: CPT | Performed by: FAMILY MEDICINE

## 2019-09-24 PROCEDURE — 36415 COLL VENOUS BLD VENIPUNCTURE: CPT

## 2019-09-27 LAB
BACTERIA UR CULT: NORMAL
BACTERIA UR CULT: NORMAL
WRITTEN AUTHORIZATION: NORMAL

## 2019-09-30 ENCOUNTER — ANTICOAGULATION VISIT (OUTPATIENT)
Dept: PHARMACY | Facility: HOSPITAL | Age: 84
End: 2019-09-30

## 2019-09-30 ENCOUNTER — APPOINTMENT (OUTPATIENT)
Dept: LAB | Facility: HOSPITAL | Age: 84
End: 2019-09-30

## 2019-09-30 DIAGNOSIS — D64.9 LOW HEMOGLOBIN: Primary | ICD-10-CM

## 2019-09-30 DIAGNOSIS — I48.19 PERSISTENT ATRIAL FIBRILLATION (HCC): ICD-10-CM

## 2019-09-30 LAB
INR PPP: 3.03 (ref 0.9–1.1)
INR PPP: 3.8 (ref 0.91–1.09)
PROTHROMBIN TIME: 31.1 SECONDS (ref 11.7–14.2)
PROTHROMBIN TIME: 45.8 SECONDS (ref 10–13.8)

## 2019-09-30 PROCEDURE — 36416 COLLJ CAPILLARY BLOOD SPEC: CPT

## 2019-09-30 PROCEDURE — 85610 PROTHROMBIN TIME: CPT

## 2019-09-30 PROCEDURE — 36415 COLL VENOUS BLD VENIPUNCTURE: CPT

## 2019-09-30 PROCEDURE — G0463 HOSPITAL OUTPT CLINIC VISIT: HCPCS

## 2019-09-30 NOTE — PROGRESS NOTES
Anticoagulation Clinic Progress Note    Anticoagulation Summary  As of 9/30/2019    INR goal:   2.0-3.0   TTR:   59.0 % (1 y)   INR used for dosing:   3.8! (9/30/2019)   Warfarin maintenance plan:   4.5 mg every Sun, Thu; 3 mg all other days   Weekly warfarin total:   24 mg   Plan last modified:   Lina Morris RPH (9/30/2019)   Next INR check:   10/7/2019   Priority:   Maintenance   Target end date:       Indications    Persistent atrial fibrillation (CMS/HCC) [I48.1]             Anticoagulation Episode Summary     INR check location:       Preferred lab:       Send INR reminders to:   [facility]    Comments:         Anticoagulation Care Providers     Provider Role Specialty Phone number    Marcie Robbins MD Referring Cardiology [phone]          Clinic Interview:  Patient Findings     Positives:   Change in health    Negatives:   Signs/symptoms of thrombosis, Signs/symptoms of bleeding,   Laboratory test error suspected, Change in alcohol use, Change in   activity, Upcoming invasive procedure, Emergency department visit,   Upcoming dental procedure, Missed doses, Extra doses, Change in   medications, Change in diet/appetite, Hospital admission, Bruising, Other   complaints    Comments:   Pt is being followed by [facility] & MD, Hgb has gone down (noted per   MD)      Clinical Outcomes     Negatives:   Major bleeding event, Thromboembolic event,   Anticoagulation-related hospital admission, Anticoagulation-related ED   visit, Anticoagulation-related fatality    Comments:   Pt is being followed by [facility] & MD, Hgb has gone down (noted per   MD)        INR History:  Anticoagulation Monitoring 9/16/2019 9/23/2019 9/30/2019   INR 3.70 3.6 3.8   INR Date 9/16/2019 9/23/2019 9/30/2019   INR Goal 2.0-3.0 2.0-3.0 2.0-3.0   Trend Down Down Same   Last Week Total 28.5 mg 22.5 mg 22.5 mg   Next Week Total 22.5 mg 22.5 mg 24 mg   Sun 4.5 mg 4.5 mg 4.5 mg   Mon Hold (9/16) 1.5 mg (9/23) 3 mg   Tue 3 mg (9/17) 3 mg  3 mg   Wed 3 mg 3 mg 3 mg   Thu 4.5 mg 4.5 mg 4.5 mg   Fri 3 mg 3 mg 3 mg   Sat 4.5 mg 3 mg 3 mg   Visit Report - - -   Some recent data might be hidden       Plan:  1. INR is Therapeutic today- by venous INR lab draw-- see above in Anticoagulation Summary.  Will instruct Agnieszka Rizzo to Continue their warfarin regimen- see above in Anticoagulation Summary.  2. Follow up in 1 week with venous lab draw for INR  3. Patient declines warfarin refills.  4. Verbal and written information provided. Patient expresses understanding and has no further questions at this time.    Lina Morris [facility]

## 2019-10-01 RX ORDER — DOXYCYCLINE HYCLATE 50 MG/1
324 CAPSULE, GELATIN COATED ORAL
Qty: 90 TABLET | Refills: 0 | Status: SHIPPED | OUTPATIENT
Start: 2019-10-01 | End: 2019-12-16

## 2019-10-07 ENCOUNTER — LAB (OUTPATIENT)
Dept: LAB | Facility: HOSPITAL | Age: 84
End: 2019-10-07

## 2019-10-07 ENCOUNTER — APPOINTMENT (OUTPATIENT)
Dept: PHARMACY | Facility: HOSPITAL | Age: 84
End: 2019-10-07

## 2019-10-07 ENCOUNTER — ANTICOAGULATION VISIT (OUTPATIENT)
Dept: PHARMACY | Facility: HOSPITAL | Age: 84
End: 2019-10-07

## 2019-10-07 DIAGNOSIS — I48.19 PERSISTENT ATRIAL FIBRILLATION (HCC): ICD-10-CM

## 2019-10-07 DIAGNOSIS — D64.9 LOW HEMOGLOBIN: ICD-10-CM

## 2019-10-07 LAB
BASOPHILS # BLD AUTO: 0.06 10*3/MM3 (ref 0–0.2)
BASOPHILS NFR BLD AUTO: 0.8 % (ref 0–1.5)
DEPRECATED RDW RBC AUTO: 50.5 FL (ref 37–54)
EOSINOPHIL # BLD AUTO: 0.17 10*3/MM3 (ref 0–0.4)
EOSINOPHIL NFR BLD AUTO: 2.4 % (ref 0.3–6.2)
ERYTHROCYTE [DISTWIDTH] IN BLOOD BY AUTOMATED COUNT: 14.4 % (ref 12.3–15.4)
HCT VFR BLD AUTO: 34.8 % (ref 34–46.6)
HGB BLD-MCNC: 11.2 G/DL (ref 12–15.9)
IMM GRANULOCYTES # BLD AUTO: 0.04 10*3/MM3 (ref 0–0.05)
IMM GRANULOCYTES NFR BLD AUTO: 0.6 % (ref 0–0.5)
INR PPP: 1.99 (ref 0.9–1.1)
LYMPHOCYTES # BLD AUTO: 1.84 10*3/MM3 (ref 0.7–3.1)
LYMPHOCYTES NFR BLD AUTO: 25.7 % (ref 19.6–45.3)
MCH RBC QN AUTO: 31 PG (ref 26.6–33)
MCHC RBC AUTO-ENTMCNC: 32.2 G/DL (ref 31.5–35.7)
MCV RBC AUTO: 96.4 FL (ref 79–97)
MONOCYTES # BLD AUTO: 0.73 10*3/MM3 (ref 0.1–0.9)
MONOCYTES NFR BLD AUTO: 10.2 % (ref 5–12)
NEUTROPHILS # BLD AUTO: 4.33 10*3/MM3 (ref 1.7–7)
NEUTROPHILS NFR BLD AUTO: 60.3 % (ref 42.7–76)
NRBC BLD AUTO-RTO: 0.1 /100 WBC (ref 0–0.2)
PLATELET # BLD AUTO: 294 10*3/MM3 (ref 140–450)
PMV BLD AUTO: 9.9 FL (ref 6–12)
PROTHROMBIN TIME: 22.3 SECONDS (ref 11.7–14.2)
RBC # BLD AUTO: 3.61 10*6/MM3 (ref 3.77–5.28)
WBC NRBC COR # BLD: 7.17 10*3/MM3 (ref 3.4–10.8)

## 2019-10-07 PROCEDURE — 85610 PROTHROMBIN TIME: CPT

## 2019-10-07 PROCEDURE — 85025 COMPLETE CBC W/AUTO DIFF WBC: CPT

## 2019-10-07 PROCEDURE — 36415 COLL VENOUS BLD VENIPUNCTURE: CPT

## 2019-10-07 NOTE — PROGRESS NOTES
Anticoagulation Clinic Progress Note    Anticoagulation Summary  As of 10/7/2019    INR goal:   2.0-3.0   TTR:   59.0 % (1.1 y)   INR used for dosin.99! (10/7/2019)   Warfarin maintenance plan:   4.5 mg every Sun, Thu; 3 mg all other days   Weekly warfarin total:   24 mg   Plan last modified:   Lina Morris RPH (2019)   Next INR check:   10/14/2019   Priority:   Maintenance   Target end date:       Indications    Persistent atrial fibrillation [I48.19]             Anticoagulation Episode Summary     INR check location:       Preferred lab:       [facility] [place]    Comments:         Anticoagulation Care Providers     Provider Role Specialty Phone number    Marcie Robbins MD Referring Cardiology [phone]          Clinic Interview:      INR History:  Anticoagulation Monitoring 2019 2019 10/7/2019   INR 3.6 3.03 1.99   INR Date 2019 2019 10/7/2019   INR Goal 2.0-3.0 2.0-3.0 2.0-3.0   Trend Down Same Same   Last Week Total 22.5 mg 22.5 mg 21 mg   Next Week Total 22.5 mg 24 mg 25.5 mg   Sun 4.5 mg 4.5 mg 4.5 mg   Mon 1.5 mg () 3 mg 4.5 mg (10/7)   Tue 3 mg 3 mg 3 mg   Wed 3 mg 3 mg 3 mg   Thu 4.5 mg 4.5 mg 4.5 mg   Fri 3 mg 3 mg 3 mg   Sat 3 mg 3 mg 3 mg   Visit Report - - -   Some recent data might be hidden       Plan:  1. INR is Therapeutic today- see above in Anticoagulation Summary.   Will instruct Agnieszka Rizzo to Change their warfarin regimen- see above in Anticoagulation Summary.  2. Follow up in 1 weeks  3. Spoke with pt today They have been instructed to call if any changes in medications, doses, concerns, etc. Patient expresses understanding and has no further questions at this time.    Lina Morris RPH

## 2019-10-14 ENCOUNTER — ANTICOAGULATION VISIT (OUTPATIENT)
Dept: PHARMACY | Facility: HOSPITAL | Age: 84
End: 2019-10-14

## 2019-10-14 DIAGNOSIS — I48.19 PERSISTENT ATRIAL FIBRILLATION (HCC): ICD-10-CM

## 2019-10-14 LAB
INR PPP: 1.8 (ref 0.91–1.09)
PROTHROMBIN TIME: 21.2 SECONDS (ref 10–13.8)

## 2019-10-14 PROCEDURE — 36416 COLLJ CAPILLARY BLOOD SPEC: CPT

## 2019-10-14 PROCEDURE — 85610 PROTHROMBIN TIME: CPT

## 2019-10-14 PROCEDURE — G0463 HOSPITAL OUTPT CLINIC VISIT: HCPCS

## 2019-10-14 NOTE — PROGRESS NOTES
Anticoagulation Clinic Progress Note    Anticoagulation Summary  As of 10/14/2019    INR goal:   2.0-3.0   TTR:   57.9 % (1.1 y)   INR used for dosin.8! (10/14/2019)   Warfarin maintenance plan:   4.5 mg every Wed, Fri; 3 mg all other days   Weekly warfarin total:   24 mg   Plan last modified:   Jose Lopez, [facility] (10/14/2019)   Next INR check:   10/21/2019   Priority:   Maintenance   Target end date:       Indications    Persistent atrial fibrillation [I48.19]             Anticoagulation Episode Summary     INR check location:       Preferred lab:       [facility]ers [facility] [place]    Comments:         Anticoagulation Care Providers     Provider Role Specialty Phone number    Marcie Robbins MD Referring Cardiology [phone]          Clinic Interview:  Patient Findings     Positives:   Change in activity    Negatives:   Signs/symptoms of thrombosis, Signs/symptoms of bleeding,   Laboratory test error suspected, Change in health, Change in alcohol use,   Upcoming invasive procedure, Emergency department visit, Upcoming dental   procedure, Missed doses, Extra doses, Change in medications, Change in   diet/appetite, Hospital admission, Bruising, Other complaints    Comments:   Pt hasn't been exercising secondary to fatigue       Clinical Outcomes     Negatives:   Major bleeding event, Thromboembolic event,   Anticoagulation-related hospital admission, Anticoagulation-related ED   visit, Anticoagulation-related fatality    Comments:   Pt hasn't been exercising secondary to fatigue         INR History:  Anticoagulation Monitoring 2019 10/7/2019 10/14/2019   INR 3.03 1.99 1.8   INR Date 2019 10/7/2019 10/14/2019   INR Goal 2.0-3.0 2.0-3.0 2.0-3.0   Trend Same Same Same   Last Week Total 22.5 mg 21 mg 24 mg   Next Week Total 24 mg 25.5 mg 25.5 mg   Sun 4.5 mg 4.5 mg 3 mg   Mon 3 mg 4.5 mg (10/7) 4.5 mg (10/14)   Tue 3 mg 3 mg 3 mg   Wed 3 mg 3 mg 4.5 mg   Thu 4.5 mg 4.5 mg  3 mg   Fri 3 mg 3 mg 4.5 mg   Sat 3 mg 3 mg 3 mg   Visit Report - - -   Some recent data might be hidden       Plan:  1. INR is Subtherapeutic today- see above in Anticoagulation Summary.   Will instruct Agnieszka Rizzo to Change their warfarin regimen to 4.5 mg MWF; pt was supposed to take 4.5 mg on 10/13 but reports she did not- see above in Anticoagulation Summary.  2. Follow up in 1 week      Jose Lopez RPH

## 2019-10-21 ENCOUNTER — ANTICOAGULATION VISIT (OUTPATIENT)
Dept: PHARMACY | Facility: HOSPITAL | Age: 84
End: 2019-10-21

## 2019-10-21 DIAGNOSIS — I48.19 PERSISTENT ATRIAL FIBRILLATION (HCC): ICD-10-CM

## 2019-10-21 LAB
INR PPP: 2.2 (ref 0.91–1.09)
PROTHROMBIN TIME: 26.6 SECONDS (ref 10–13.8)

## 2019-10-21 PROCEDURE — 36416 COLLJ CAPILLARY BLOOD SPEC: CPT

## 2019-10-21 PROCEDURE — 85610 PROTHROMBIN TIME: CPT

## 2019-10-21 RX ORDER — ASCORBIC ACID 500 MG
1 TABLET ORAL DAILY
COMMUNITY
End: 2019-12-16

## 2019-10-21 NOTE — PROGRESS NOTES
Anticoagulation Clinic Progress Note    Anticoagulation Summary  As of 10/21/2019    INR goal:   2.0-3.0   TTR:   57.8 % (1.1 y)   INR used for dosin.2 (10/21/2019)   Warfarin maintenance plan:   4.5 mg every Wed, Fri; 3 mg all other days   Weekly warfarin total:   24 mg   No change documented:   Reema Dorantes   Plan last modified:   Jose Lopez, [facility] (10/14/2019)   Next INR check:   2019   Priority:   Maintenance   Target end date:       Indications    Persistent atrial fibrillation [I48.19]             Anticoagulation Episode Summary     INR check location:       Preferred lab:       Send INR reminders to:    [facility]    Comments:         Anticoagulation Care Providers     Provider Role Specialty Phone number    Marcie Robbins MD Referring Cardiology [phone]          Clinic Interview:  Patient Findings     Negatives:   Signs/symptoms of thrombosis, Signs/symptoms of bleeding,   Laboratory test error suspected, Change in health, Change in alcohol use,   Change in activity, Upcoming invasive procedure, Emergency department   visit, Upcoming dental procedure, Missed doses, Extra doses, Change in   medications, Change in diet/appetite, Hospital admission, Bruising, Other   complaints      Clinical Outcomes     Negatives:   Major bleeding event, Thromboembolic event,   Anticoagulation-related hospital admission, Anticoagulation-related ED   visit, Anticoagulation-related fatality        INR History:  Anticoagulation Monitoring 10/7/2019 10/14/2019 10/21/2019   INR 1.99 1.8 2.2   INR Date 10/7/2019 10/14/2019 10/21/2019   INR Goal 2.0-3.0 2.0-3.0 2.0-3.0   Trend Same Same Same   Last Week Total 21 mg 24 mg 25.5 mg   Next Week Total 25.5 mg 25.5 mg 24 mg   Sun 4.5 mg 3 mg 3 mg   Mon 4.5 mg (10/7) 4.5 mg (10/14) 3 mg   Tue 3 mg 3 mg 3 mg   Wed 3 mg 4.5 mg 4.5 mg   Thu 4.5 mg 3 mg 3 mg   Fri 3 mg 4.5 mg 4.5 mg   Sat 3 mg 3 mg 3 mg   Visit Report - - -   Some recent data might  be hidden       Plan:  1. INR is therapeutic today- see above in Anticoagulation Summary.   Will instruct Agnieszka Rizzo to continue their warfarin regimen- see above in Anticoagulation Summary.  2. Follow up in 2 weeks.  3. Patient declines warfarin refills.  4. Verbal and written information provided. Patient expresses understanding and has no further questions at this time.    Reema Dorantes

## 2019-11-04 ENCOUNTER — ANTICOAGULATION VISIT (OUTPATIENT)
Dept: PHARMACY | Facility: HOSPITAL | Age: 84
End: 2019-11-04

## 2019-11-04 DIAGNOSIS — I48.19 PERSISTENT ATRIAL FIBRILLATION (HCC): ICD-10-CM

## 2019-11-04 LAB
INR PPP: 1.7 (ref 0.91–1.09)
PROTHROMBIN TIME: 20 SECONDS (ref 10–13.8)

## 2019-11-04 PROCEDURE — 36416 COLLJ CAPILLARY BLOOD SPEC: CPT

## 2019-11-04 PROCEDURE — 85610 PROTHROMBIN TIME: CPT

## 2019-11-04 PROCEDURE — G0463 HOSPITAL OUTPT CLINIC VISIT: HCPCS

## 2019-11-04 NOTE — PROGRESS NOTES
Anticoagulation Clinic Progress Note    Anticoagulation Summary  As of 2019    INR goal:   2.0-3.0   TTR:   57.2 % (1.1 y)   INR used for dosin.7! (2019)   Warfarin maintenance plan:   4.5 mg every Mon, Wed, Fri; 3 mg all other days   Weekly warfarin total:   25.5 mg   Plan last modified:   Lev cMkeon RP (2019)   Next INR check:   2019   Priority:   Maintenance   Target end date:       Indications    Persistent atrial fibrillation [I48.19]             Anticoagulation Episode Summary     INR check location:       Preferred lab:       Send INR reminders to:   [facility]    Comments:         Anticoagulation Care Providers     Provider Role Specialty Phone number    Marcie Robbins MD Referring Cardiology [phone]          Clinic Interview:  Patient Findings     Positives:   Missed doses    Negatives:   Signs/symptoms of thrombosis, Signs/symptoms of bleeding,   Laboratory test error suspected, Change in health, Change in alcohol use,   Change in activity, Upcoming invasive procedure, Emergency department   visit, Upcoming dental procedure, Extra doses, Change in medications,   Change in diet/appetite, Hospital admission, Bruising, Other complaints    Comments:   Missed dose 4 days ago.      Clinical Outcomes     Negatives:   Major bleeding event, Thromboembolic event,   Anticoagulation-related hospital admission, Anticoagulation-related ED   visit, Anticoagulation-related fatality    Comments:   Missed dose 4 days ago.        INR History:  Anticoagulation Monitoring 10/14/2019 10/21/2019 2019   INR 1.8 2.2 1.7   INR Date 10/14/2019 10/21/2019 2019   INR Goal 2.0-3.0 2.0-3.0 2.0-3.0   Trend Same Same Up   Last Week Total 24 mg 25.5 mg 21 mg   Next Week Total 25.5 mg 24 mg 25.5 mg   Sun 3 mg 3 mg 3 mg   Mon 4.5 mg (10/14) 3 mg 4.5 mg   Tue 3 mg 3 mg 3 mg   Wed 4.5 mg 4.5 mg 4.5 mg   Thu 3 mg 3 mg 3 mg   Fri 4.5 mg 4.5 mg 4.5 mg   Sat 3 mg 3 mg 3 mg   Visit  Report - - -   Some recent data might be hidden       Plan:  1. INR is Subtherapeutic today- see above in Anticoagulation Summary.  Will instruct Agnieszka Rizzo to Increase their warfarin regimen- see above in Anticoagulation Summary.  2. Follow up in 2 weeks  3. Patient declines warfarin refills.  4. Verbal and written information provided. Patient expresses understanding and has no further questions at this time.    Lev Mckeon formerly [facility]

## 2019-11-14 ENCOUNTER — OFFICE VISIT (OUTPATIENT)
Dept: INTERNAL MEDICINE | Facility: CLINIC | Age: 84
End: 2019-11-14

## 2019-11-14 VITALS
WEIGHT: 131.5 LBS | OXYGEN SATURATION: 98 % | TEMPERATURE: 97.8 F | HEIGHT: 63 IN | DIASTOLIC BLOOD PRESSURE: 60 MMHG | BODY MASS INDEX: 23.3 KG/M2 | SYSTOLIC BLOOD PRESSURE: 108 MMHG | HEART RATE: 66 BPM

## 2019-11-14 DIAGNOSIS — D50.9 IRON DEFICIENCY ANEMIA, UNSPECIFIED IRON DEFICIENCY ANEMIA TYPE: ICD-10-CM

## 2019-11-14 DIAGNOSIS — I10 BENIGN ESSENTIAL HYPERTENSION: Primary | ICD-10-CM

## 2019-11-14 DIAGNOSIS — I47.20 VENTRICULAR TACHYCARDIA (HCC): ICD-10-CM

## 2019-11-14 DIAGNOSIS — I50.32 CHRONIC DIASTOLIC CHF (CONGESTIVE HEART FAILURE) (HCC): ICD-10-CM

## 2019-11-14 PROCEDURE — 99214 OFFICE O/P EST MOD 30 MIN: CPT | Performed by: FAMILY MEDICINE

## 2019-11-14 NOTE — PROGRESS NOTES
Agnieszka Rizzo is a 89 y.o. female.      Assessment/Plan   Problem List Items Addressed This Visit        Cardiovascular and Mediastinum    Benign essential hypertension - Primary    Relevant Orders    Basic Metabolic Panel    Ventricular tachycardia (CMS/HCC)    Chronic diastolic CHF (congestive heart failure) (CMS/HCC)    Relevant Orders    Basic Metabolic Panel       Hematopoietic and Hemostatic    Iron deficiency anemia    Relevant Orders    Iron Profile         When exercising heart rate in the  range is okay continue present exertional routine to keep it less than 100 monitor blood pressure occasionally goal of less than 140/90 up results of blood work for management of medication with potential side effects electrolyte abnormalities as well as iron replacement    Return in about 3 months (around 2/14/2020), or if symptoms worsen or fail to improve, for Recheck, Next scheduled follow up.      Chief Complaint   Patient presents with   • follow up to hypertension     /60 in RA   • follow up to iron     Social History     Tobacco Use   • Smoking status: Never Smoker   • Smokeless tobacco: Never Used   • Tobacco comment: caffiene daily   Substance Use Topics   • Alcohol use: Yes     Comment: occasional   • Drug use: No       History of Present Illness   Patient follows up for adjustment and medication for iron anemia deficiency she is been taking the ferrous gluconate without any unwanted side effects no constipation no change in bowel habits her blood pressures been well controlled she is also working out regularly at the [facility] wanted to know what her heart rate should be target rate given her history of ventricular tachycardia and atrial fibrillation is well controlled with verapamil at this point she does not have any swelling in her lower extremities no cough dizziness with exertion she usually gets her heart rate up to about 100 and then starts to back off  The following portions of the patient's  "history were reviewed and updated as appropriate:PMHroutine: Social history , Allergies, Current Medications, Active Problem List and Health Maintenance    Review of Systems   Constitutional: Negative.    HENT: Negative.    Respiratory: Negative.    Cardiovascular: Negative.    Gastrointestinal: Positive for abdominal distention. Negative for blood in stool, constipation and diarrhea.   Musculoskeletal: Negative.    Neurological: Negative.        Objective   Vitals:    11/14/19 0918   BP: 108/60   BP Location: Left arm   Patient Position: Sitting   Cuff Size: Adult   Pulse: 66   Temp: 97.8 °F (36.6 °C)   TempSrc: Oral   SpO2: 98%   Weight: 59.6 kg (131 lb 8 oz)   Height: 160 cm (63\")     Body mass index is 23.29 kg/m².  Physical Exam   Constitutional: She is oriented to person, place, and time. She appears well-developed and well-nourished. No distress.   HENT:   Head: Normocephalic and atraumatic.   Eyes: Conjunctivae and EOM are normal. Pupils are equal, round, and reactive to light. Right eye exhibits no discharge. Left eye exhibits no discharge. No scleral icterus.   Neck: Normal range of motion. Neck supple. No tracheal deviation present. No thyromegaly present.   Cardiovascular: Normal rate, regular rhythm, normal heart sounds, intact distal pulses and normal pulses. Exam reveals no gallop.   No murmur heard.  Pulmonary/Chest: Effort normal and breath sounds normal. No respiratory distress. She has no wheezes. She has no rales.   Musculoskeletal: Normal range of motion. She exhibits no edema.   Neurological: She is alert and oriented to person, place, and time. She exhibits normal muscle tone. Coordination normal.   Skin: Skin is warm. No rash noted. No erythema. No pallor.   Psychiatric: She has a normal mood and affect. Her behavior is normal. Judgment and thought content normal.   Nursing note and vitals reviewed.    Reviewed Data:  Anticoagulation Visit on 11/04/2019   Component Date Value Ref Range Status "   • Protime 11/04/2019 20.0* 10.0 - 13.8 seconds Final   • INR 11/04/2019 1.7* 0.91 - 1.09 Final   Anticoagulation Visit on 10/21/2019   Component Date Value Ref Range Status   • Protime 10/21/2019 26.6* 10.0 - 13.8 seconds Final   • INR 10/21/2019 2.2* 0.91 - 1.09 Final

## 2019-11-15 LAB
BUN SERPL-MCNC: 18 MG/DL (ref 8–23)
BUN/CREAT SERPL: 23.7 (ref 7–25)
CALCIUM SERPL-MCNC: 9.7 MG/DL (ref 8.6–10.5)
CHLORIDE SERPL-SCNC: 98 MMOL/L (ref 98–107)
CO2 SERPL-SCNC: 31.6 MMOL/L (ref 22–29)
CREAT SERPL-MCNC: 0.76 MG/DL (ref 0.57–1)
GLUCOSE SERPL-MCNC: 82 MG/DL (ref 65–99)
IRON SATN MFR SERPL: 25 % (ref 20–50)
IRON SERPL-MCNC: 90 MCG/DL (ref 37–145)
POTASSIUM SERPL-SCNC: 4.3 MMOL/L (ref 3.5–5.2)
SODIUM SERPL-SCNC: 144 MMOL/L (ref 136–145)
TIBC SERPL-MCNC: 361 MCG/DL
UIBC SERPL-MCNC: 271 MCG/DL (ref 112–346)

## 2019-11-18 ENCOUNTER — ANTICOAGULATION VISIT (OUTPATIENT)
Dept: PHARMACY | Facility: HOSPITAL | Age: 84
End: 2019-11-18

## 2019-11-18 DIAGNOSIS — I48.19 PERSISTENT ATRIAL FIBRILLATION (HCC): ICD-10-CM

## 2019-11-18 LAB
INR PPP: 2.2 (ref 0.91–1.09)
PROTHROMBIN TIME: 26.5 SECONDS (ref 10–13.8)

## 2019-11-18 PROCEDURE — 36416 COLLJ CAPILLARY BLOOD SPEC: CPT

## 2019-11-18 PROCEDURE — 85610 PROTHROMBIN TIME: CPT

## 2019-11-18 RX ORDER — WARFARIN SODIUM 3 MG/1
TABLET ORAL
Qty: 135 TABLET | Refills: 0 | Status: SHIPPED | OUTPATIENT
Start: 2019-11-18 | End: 2019-11-24 | Stop reason: SDUPTHER

## 2019-11-18 NOTE — PROGRESS NOTES
Anticoagulation Clinic Progress Note    Anticoagulation Summary  As of 2019    INR goal:   2.0-3.0   TTR:   56.6 % (1.2 y)   INR used for dosin.2 (2019)   Warfarin maintenance plan:   4.5 mg every Mon, Wed, Fri; 3 mg all other days   Weekly warfarin total:   25.5 mg   No change documented:   Sara Santana   Plan last modified:   Lev Mckeon RPH (2019)   Next INR check:   2019   Priority:   Maintenance   Target end date:       Indications    Persistent atrial fibrillation [I48.19]             Anticoagulation Episode Summary     INR check location:       Preferred lab:       Send INR reminders to:    [facility]    Comments:         Anticoagulation Care Providers     Provider Role Specialty Phone number    Marcie Robbins MD Referring Cardiology [phone]          Clinic Interview:  Patient Findings     Negatives:   Signs/symptoms of thrombosis, Signs/symptoms of bleeding,   Laboratory test error suspected, Change in health, Change in alcohol use,   Change in activity, Upcoming invasive procedure, Emergency department   visit, Upcoming dental procedure, Missed doses, Extra doses, Change in   medications, Change in diet/appetite, Hospital admission, Bruising, Other   complaints      Clinical Outcomes     Negatives:   Major bleeding event, Thromboembolic event,   Anticoagulation-related hospital admission, Anticoagulation-related ED   visit, Anticoagulation-related fatality        INR History:  Anticoagulation Monitoring 10/21/2019 2019 2019   INR 2.2 1.7 2.2   INR Date 10/21/2019 2019 2019   INR Goal 2.0-3.0 2.0-3.0 2.0-3.0   Trend Same Up Same   Last Week Total 25.5 mg 21 mg 25.5 mg   Next Week Total 24 mg 25.5 mg 25.5 mg   Sun 3 mg 3 mg 3 mg   Mon 3 mg 4.5 mg 4.5 mg   Tue 3 mg 3 mg 3 mg   Wed 4.5 mg 4.5 mg 4.5 mg   Thu 3 mg 3 mg 3 mg   Fri 4.5 mg 4.5 mg 4.5 mg   Sat 3 mg 3 mg 3 mg   Visit Report - - -   Some recent data might be  hidden       Plan:  1. INR is therapeutic today- see above in Anticoagulation Summary.   Will instruct Agnieszka Rizzo to continue their warfarin regimen- see above in Anticoagulation Summary.  2. Follow up in 3 weeks.  3. Patient desires warfarin refills.  4. Verbal and written information provided. Patient expresses understanding and has no further questions at this time.    Sara Santana

## 2019-11-19 ENCOUNTER — OFFICE VISIT (OUTPATIENT)
Dept: INTERNAL MEDICINE | Facility: CLINIC | Age: 84
End: 2019-11-19

## 2019-11-19 VITALS
HEART RATE: 74 BPM | OXYGEN SATURATION: 97 % | WEIGHT: 129.6 LBS | DIASTOLIC BLOOD PRESSURE: 66 MMHG | SYSTOLIC BLOOD PRESSURE: 138 MMHG | BODY MASS INDEX: 22.96 KG/M2 | TEMPERATURE: 98.1 F | HEIGHT: 63 IN

## 2019-11-19 DIAGNOSIS — B00.9 HERPES INFECTION: Primary | ICD-10-CM

## 2019-11-19 PROCEDURE — 99213 OFFICE O/P EST LOW 20 MIN: CPT | Performed by: FAMILY MEDICINE

## 2019-11-19 RX ORDER — VALACYCLOVIR HYDROCHLORIDE 1 G/1
1000 TABLET, FILM COATED ORAL 2 TIMES DAILY
Qty: 20 TABLET | Refills: 0 | Status: SHIPPED | OUTPATIENT
Start: 2019-11-19 | End: 2019-12-16

## 2019-11-19 NOTE — PROGRESS NOTES
"Agnieszka Rizzo is a 89 y.o. female.      Assessment/Plan   Problem List Items Addressed This Visit        Other    Herpes infection - Primary    Relevant Medications    valACYclovir (VALTREX) 1000 MG tablet             Return if symptoms worsen or fail to improve, for Recheck.      Chief Complaint   Patient presents with   • rash on face     Social History     Tobacco Use   • Smoking status: Never Smoker   • Smokeless tobacco: Never Used   • Tobacco comment: caffiene daily   Substance Use Topics   • Alcohol use: Yes     Comment: occasional   • Drug use: No       Rash   This is a new problem. The current episode started in the past 7 days. The problem has been gradually worsening since onset. The affected locations include the face. The rash is characterized by redness. She was exposed to nothing. Pertinent negatives include no congestion, cough, fatigue or fever. Past treatments include nothing. The treatment provided no relief.        The following portions of the patient's history were reviewed and updated as appropriate:PMHroutine: Social history , Allergies, Current Medications, Active Problem List and Health Maintenance    Review of Systems   Constitutional: Negative for fatigue and fever.   HENT: Negative for congestion.    Respiratory: Negative for cough.    Skin: Positive for rash.   Neurological: Negative.    Hematological: Negative.        Objective   Vitals:    11/19/19 1124   BP: 138/66   BP Location: Left arm   Patient Position: Sitting   Cuff Size: Adult   Pulse: 74   Temp: 98.1 °F (36.7 °C)   TempSrc: Oral   SpO2: 97%   Weight: 58.8 kg (129 lb 9.6 oz)   Height: 160 cm (63\")     Body mass index is 22.96 kg/m².  Physical Exam   Constitutional: She appears well-developed and well-nourished.   HENT:   Head: Normocephalic and atraumatic.       Eyes: Conjunctivae are normal. No scleral icterus.   Psychiatric: She has a normal mood and affect. Her behavior is normal. Thought content normal.   Nursing note " and vitals reviewed.    Reviewed Data:  Anticoagulation Visit on 11/18/2019   Component Date Value Ref Range Status   • Protime 11/18/2019 26.5* 10.0 - 13.8 seconds Final   • INR 11/18/2019 2.2* 0.91 - 1.09 Final   Office Visit on 11/14/2019   Component Date Value Ref Range Status   • TIBC 11/14/2019 361  mcg/dL Final   • UIBC 11/14/2019 271  112 - 346 mcg/dL Final   • Iron 11/14/2019 90  37 - 145 mcg/dL Final   • Iron Saturation 11/14/2019 25  20 - 50 % Final   • Glucose 11/14/2019 82  65 - 99 mg/dL Final   • BUN 11/14/2019 18  8 - 23 mg/dL Final   • Creatinine 11/14/2019 0.76  0.57 - 1.00 mg/dL Final   • eGFR Non  Am 11/14/2019 72  >60 mL/min/1.73 Final    Comment: The MDRD GFR formula is only valid for adults with stable  renal function between ages 18 and 70.     • eGFR  Am 11/14/2019 87  >60 mL/min/1.73 Final   • BUN/Creatinine Ratio 11/14/2019 23.7  7.0 - 25.0 Final   • Sodium 11/14/2019 144  136 - 145 mmol/L Final   • Potassium 11/14/2019 4.3  3.5 - 5.2 mmol/L Final   • Chloride 11/14/2019 98  98 - 107 mmol/L Final   • Total CO2 11/14/2019 31.6* 22.0 - 29.0 mmol/L Final   • Calcium 11/14/2019 9.7  8.6 - 10.5 mg/dL Final   Anticoagulation Visit on 11/04/2019   Component Date Value Ref Range Status   • Protime 11/04/2019 20.0* 10.0 - 13.8 seconds Final   • INR 11/04/2019 1.7* 0.91 - 1.09 Final   Anticoagulation Visit on 10/21/2019   Component Date Value Ref Range Status   • Protime 10/21/2019 26.6* 10.0 - 13.8 seconds Final   • INR 10/21/2019 2.2* 0.91 - 1.09 Final

## 2019-11-22 ENCOUNTER — TELEPHONE (OUTPATIENT)
Dept: INTERNAL MEDICINE | Facility: CLINIC | Age: 84
End: 2019-11-22

## 2019-11-22 NOTE — TELEPHONE ENCOUNTER
Patient called asking the herpes on her face was contagious for her to be around people or if they only touch the area.  Please advise.

## 2019-11-25 RX ORDER — WARFARIN SODIUM 3 MG/1
TABLET ORAL
Qty: 238 TABLET | Refills: 0 | Status: SHIPPED | OUTPATIENT
Start: 2019-11-25 | End: 2019-12-16

## 2019-11-28 RX ORDER — PREDNISONE 20 MG/1
20 TABLET ORAL DAILY
Qty: 5 TABLET | Refills: 0 | Status: SHIPPED | OUTPATIENT
Start: 2019-11-28 | End: 2019-12-04 | Stop reason: HOSPADM

## 2019-12-01 ENCOUNTER — HOSPITAL ENCOUNTER (OUTPATIENT)
Facility: HOSPITAL | Age: 84
Setting detail: OBSERVATION
LOS: 1 days | Discharge: HOME | End: 2019-12-04
Attending: EMERGENCY MEDICINE | Admitting: INTERNAL MEDICINE

## 2019-12-01 DIAGNOSIS — N39.0 UTI (URINARY TRACT INFECTION), UNCOMPLICATED: ICD-10-CM

## 2019-12-01 DIAGNOSIS — J45.901 MODERATE ASTHMA WITH ACUTE EXACERBATION, UNSPECIFIED WHETHER PERSISTENT: Primary | ICD-10-CM

## 2019-12-01 PROCEDURE — 96374 THER/PROPH/DIAG INJ IV PUSH: CPT

## 2019-12-01 PROCEDURE — 99285 EMERGENCY DEPT VISIT HI MDM: CPT

## 2019-12-02 ENCOUNTER — APPOINTMENT (OUTPATIENT)
Dept: GENERAL RADIOLOGY | Facility: HOSPITAL | Age: 84
End: 2019-12-02

## 2019-12-02 PROBLEM — J45.901 MODERATE ASTHMA WITH ACUTE EXACERBATION: Status: ACTIVE | Noted: 2019-12-02

## 2019-12-02 LAB
ALBUMIN SERPL-MCNC: 3.7 G/DL (ref 3.5–5.2)
ALBUMIN/GLOB SERPL: 1 G/DL
ALP SERPL-CCNC: 89 U/L (ref 39–117)
ALT SERPL W P-5'-P-CCNC: 40 U/L (ref 1–33)
ANION GAP SERPL CALCULATED.3IONS-SCNC: 15.3 MMOL/L (ref 5–15)
AST SERPL-CCNC: 42 U/L (ref 1–32)
B PARAPERT DNA SPEC QL NAA+PROBE: NOT DETECTED
B PERT DNA SPEC QL NAA+PROBE: NOT DETECTED
BACTERIA UR QL AUTO: ABNORMAL /HPF
BASOPHILS # BLD AUTO: 0.04 10*3/MM3 (ref 0–0.2)
BASOPHILS NFR BLD AUTO: 0.5 % (ref 0–1.5)
BILIRUB SERPL-MCNC: 0.4 MG/DL (ref 0.2–1.2)
BILIRUB UR QL STRIP: NEGATIVE
BUN BLD-MCNC: 17 MG/DL (ref 8–23)
BUN/CREAT SERPL: 24.6 (ref 7–25)
C PNEUM DNA NPH QL NAA+NON-PROBE: NOT DETECTED
CALCIUM SPEC-SCNC: 9.2 MG/DL (ref 8.6–10.5)
CHLORIDE SERPL-SCNC: 102 MMOL/L (ref 98–107)
CLARITY UR: CLEAR
CO2 SERPL-SCNC: 26.7 MMOL/L (ref 22–29)
COLOR UR: YELLOW
CREAT BLD-MCNC: 0.69 MG/DL (ref 0.57–1)
DEPRECATED RDW RBC AUTO: 51.5 FL (ref 37–54)
EOSINOPHIL # BLD AUTO: 0.01 10*3/MM3 (ref 0–0.4)
EOSINOPHIL NFR BLD AUTO: 0.1 % (ref 0.3–6.2)
ERYTHROCYTE [DISTWIDTH] IN BLOOD BY AUTOMATED COUNT: 14.6 % (ref 12.3–15.4)
FLUAV H1 2009 PAND RNA NPH QL NAA+PROBE: NOT DETECTED
FLUAV H1 HA GENE NPH QL NAA+PROBE: NOT DETECTED
FLUAV H3 RNA NPH QL NAA+PROBE: NOT DETECTED
FLUAV SUBTYP SPEC NAA+PROBE: NOT DETECTED
FLUBV RNA ISLT QL NAA+PROBE: NOT DETECTED
GFR SERPL CREATININE-BSD FRML MDRD: 80 ML/MIN/1.73
GLOBULIN UR ELPH-MCNC: 3.8 GM/DL
GLUCOSE BLD-MCNC: 131 MG/DL (ref 65–99)
GLUCOSE BLDC GLUCOMTR-MCNC: 201 MG/DL (ref 70–130)
GLUCOSE UR STRIP-MCNC: NEGATIVE MG/DL
HADV DNA SPEC NAA+PROBE: NOT DETECTED
HCOV 229E RNA SPEC QL NAA+PROBE: NOT DETECTED
HCOV HKU1 RNA SPEC QL NAA+PROBE: NOT DETECTED
HCOV NL63 RNA SPEC QL NAA+PROBE: NOT DETECTED
HCOV OC43 RNA SPEC QL NAA+PROBE: NOT DETECTED
HCT VFR BLD AUTO: 31.6 % (ref 34–46.6)
HGB BLD-MCNC: 10.4 G/DL (ref 12–15.9)
HGB UR QL STRIP.AUTO: ABNORMAL
HMPV RNA NPH QL NAA+NON-PROBE: NOT DETECTED
HPIV1 RNA SPEC QL NAA+PROBE: NOT DETECTED
HPIV2 RNA SPEC QL NAA+PROBE: NOT DETECTED
HPIV3 RNA NPH QL NAA+PROBE: NOT DETECTED
HPIV4 P GENE NPH QL NAA+PROBE: NOT DETECTED
HYALINE CASTS UR QL AUTO: ABNORMAL /LPF
IMM GRANULOCYTES # BLD AUTO: 0.06 10*3/MM3 (ref 0–0.05)
IMM GRANULOCYTES NFR BLD AUTO: 0.7 % (ref 0–0.5)
INR PPP: 3.2 (ref 0.9–1.1)
INR PPP: 3.21 (ref 0.9–1.1)
KETONES UR QL STRIP: NEGATIVE
LEUKOCYTE ESTERASE UR QL STRIP.AUTO: ABNORMAL
LYMPHOCYTES # BLD AUTO: 1.65 10*3/MM3 (ref 0.7–3.1)
LYMPHOCYTES NFR BLD AUTO: 18.6 % (ref 19.6–45.3)
M PNEUMO IGG SER IA-ACNC: NOT DETECTED
MCH RBC QN AUTO: 31.9 PG (ref 26.6–33)
MCHC RBC AUTO-ENTMCNC: 32.9 G/DL (ref 31.5–35.7)
MCV RBC AUTO: 96.9 FL (ref 79–97)
MONOCYTES # BLD AUTO: 0.98 10*3/MM3 (ref 0.1–0.9)
MONOCYTES NFR BLD AUTO: 11 % (ref 5–12)
NEUTROPHILS # BLD AUTO: 6.13 10*3/MM3 (ref 1.7–7)
NEUTROPHILS NFR BLD AUTO: 69.1 % (ref 42.7–76)
NITRITE UR QL STRIP: NEGATIVE
NRBC BLD AUTO-RTO: 0.1 /100 WBC (ref 0–0.2)
NT-PROBNP SERPL-MCNC: 771.8 PG/ML (ref 5–1800)
PH UR STRIP.AUTO: 7.5 [PH] (ref 5–8)
PLATELET # BLD AUTO: 276 10*3/MM3 (ref 140–450)
PMV BLD AUTO: 10.1 FL (ref 6–12)
POTASSIUM BLD-SCNC: 3.3 MMOL/L (ref 3.5–5.2)
POTASSIUM BLD-SCNC: 4.1 MMOL/L (ref 3.5–5.2)
PROT SERPL-MCNC: 7.5 G/DL (ref 6–8.5)
PROT UR QL STRIP: NEGATIVE
PROTHROMBIN TIME: 32.5 SECONDS (ref 11.7–14.2)
PROTHROMBIN TIME: 32.5 SECONDS (ref 11.7–14.2)
RBC # BLD AUTO: 3.26 10*6/MM3 (ref 3.77–5.28)
RBC # UR: ABNORMAL /HPF
REF LAB TEST METHOD: ABNORMAL
RHINOVIRUS RNA SPEC NAA+PROBE: NOT DETECTED
RSV RNA NPH QL NAA+NON-PROBE: NOT DETECTED
SODIUM BLD-SCNC: 144 MMOL/L (ref 136–145)
SP GR UR STRIP: 1.01 (ref 1–1.03)
SQUAMOUS #/AREA URNS HPF: ABNORMAL /HPF
TROPONIN T SERPL-MCNC: <0.01 NG/ML (ref 0–0.03)
UROBILINOGEN UR QL STRIP: ABNORMAL
WBC NRBC COR # BLD: 8.87 10*3/MM3 (ref 3.4–10.8)
WBC UR QL AUTO: ABNORMAL /HPF

## 2019-12-02 PROCEDURE — 85025 COMPLETE CBC W/AUTO DIFF WBC: CPT | Performed by: PHYSICIAN ASSISTANT

## 2019-12-02 PROCEDURE — 96376 TX/PRO/DX INJ SAME DRUG ADON: CPT

## 2019-12-02 PROCEDURE — 25010000002 METHYLPREDNISOLONE PER 40 MG: Performed by: INTERNAL MEDICINE

## 2019-12-02 PROCEDURE — 0100U HC BIOFIRE FILMARRAY RESP PANEL 2: CPT | Performed by: PHYSICIAN ASSISTANT

## 2019-12-02 PROCEDURE — G0378 HOSPITAL OBSERVATION PER HR: HCPCS

## 2019-12-02 PROCEDURE — 81001 URINALYSIS AUTO W/SCOPE: CPT | Performed by: PHYSICIAN ASSISTANT

## 2019-12-02 PROCEDURE — 85610 PROTHROMBIN TIME: CPT | Performed by: PHYSICIAN ASSISTANT

## 2019-12-02 PROCEDURE — 84132 ASSAY OF SERUM POTASSIUM: CPT | Performed by: INTERNAL MEDICINE

## 2019-12-02 PROCEDURE — 63710000001 PREDNISONE PER 1 MG: Performed by: INTERNAL MEDICINE

## 2019-12-02 PROCEDURE — 96375 TX/PRO/DX INJ NEW DRUG ADDON: CPT

## 2019-12-02 PROCEDURE — 87077 CULTURE AEROBIC IDENTIFY: CPT | Performed by: PHYSICIAN ASSISTANT

## 2019-12-02 PROCEDURE — 96374 THER/PROPH/DIAG INJ IV PUSH: CPT

## 2019-12-02 PROCEDURE — 82962 GLUCOSE BLOOD TEST: CPT

## 2019-12-02 PROCEDURE — 93010 ELECTROCARDIOGRAM REPORT: CPT | Performed by: INTERNAL MEDICINE

## 2019-12-02 PROCEDURE — 85610 PROTHROMBIN TIME: CPT | Performed by: INTERNAL MEDICINE

## 2019-12-02 PROCEDURE — 25010000002 METHYLPREDNISOLONE PER 125 MG: Performed by: PHYSICIAN ASSISTANT

## 2019-12-02 PROCEDURE — 71046 X-RAY EXAM CHEST 2 VIEWS: CPT

## 2019-12-02 PROCEDURE — 83880 ASSAY OF NATRIURETIC PEPTIDE: CPT | Performed by: PHYSICIAN ASSISTANT

## 2019-12-02 PROCEDURE — 94799 UNLISTED PULMONARY SVC/PX: CPT

## 2019-12-02 PROCEDURE — 80053 COMPREHEN METABOLIC PANEL: CPT | Performed by: PHYSICIAN ASSISTANT

## 2019-12-02 PROCEDURE — 94640 AIRWAY INHALATION TREATMENT: CPT

## 2019-12-02 PROCEDURE — 93005 ELECTROCARDIOGRAM TRACING: CPT | Performed by: PHYSICIAN ASSISTANT

## 2019-12-02 PROCEDURE — 84484 ASSAY OF TROPONIN QUANT: CPT | Performed by: PHYSICIAN ASSISTANT

## 2019-12-02 PROCEDURE — 87086 URINE CULTURE/COLONY COUNT: CPT | Performed by: PHYSICIAN ASSISTANT

## 2019-12-02 PROCEDURE — 87186 SC STD MICRODIL/AGAR DIL: CPT | Performed by: PHYSICIAN ASSISTANT

## 2019-12-02 PROCEDURE — 25010000002 FUROSEMIDE PER 20 MG: Performed by: INTERNAL MEDICINE

## 2019-12-02 RX ORDER — CEPHALEXIN 500 MG/1
500 CAPSULE ORAL ONCE
Status: COMPLETED | OUTPATIENT
Start: 2019-12-02 | End: 2019-12-02

## 2019-12-02 RX ORDER — ATORVASTATIN CALCIUM 20 MG/1
40 TABLET, FILM COATED ORAL DAILY
Status: DISCONTINUED | OUTPATIENT
Start: 2019-12-02 | End: 2019-12-02

## 2019-12-02 RX ORDER — SODIUM CHLORIDE 0.9 % (FLUSH) 0.9 %
10 SYRINGE (ML) INJECTION AS NEEDED
Status: DISCONTINUED | OUTPATIENT
Start: 2019-12-02 | End: 2019-12-04 | Stop reason: HOSPADM

## 2019-12-02 RX ORDER — WARFARIN SODIUM 4 MG/1
4 TABLET ORAL
Status: COMPLETED | OUTPATIENT
Start: 2019-12-02 | End: 2019-12-02

## 2019-12-02 RX ORDER — SODIUM CHLORIDE 0.9 % (FLUSH) 0.9 %
10 SYRINGE (ML) INJECTION EVERY 12 HOURS SCHEDULED
Status: DISCONTINUED | OUTPATIENT
Start: 2019-12-02 | End: 2019-12-04 | Stop reason: HOSPADM

## 2019-12-02 RX ORDER — METHYLPREDNISOLONE SODIUM SUCCINATE 125 MG/2ML
80 INJECTION, POWDER, LYOPHILIZED, FOR SOLUTION INTRAMUSCULAR; INTRAVENOUS ONCE
Status: COMPLETED | OUTPATIENT
Start: 2019-12-02 | End: 2019-12-02

## 2019-12-02 RX ORDER — IPRATROPIUM BROMIDE AND ALBUTEROL SULFATE 2.5; .5 MG/3ML; MG/3ML
3 SOLUTION RESPIRATORY (INHALATION)
Status: DISCONTINUED | OUTPATIENT
Start: 2019-12-02 | End: 2019-12-04 | Stop reason: HOSPADM

## 2019-12-02 RX ORDER — ALBUTEROL SULFATE 2.5 MG/3ML
2.5 SOLUTION RESPIRATORY (INHALATION)
Status: COMPLETED | OUTPATIENT
Start: 2019-12-02 | End: 2019-12-02

## 2019-12-02 RX ORDER — PREDNISONE 20 MG/1
20 TABLET ORAL DAILY
Status: COMPLETED | OUTPATIENT
Start: 2019-12-02 | End: 2019-12-02

## 2019-12-02 RX ORDER — TORSEMIDE 20 MG/1
20 TABLET ORAL
Status: DISCONTINUED | OUTPATIENT
Start: 2019-12-02 | End: 2019-12-04 | Stop reason: HOSPADM

## 2019-12-02 RX ORDER — POTASSIUM CHLORIDE 1.5 G/1.77G
40 POWDER, FOR SOLUTION ORAL AS NEEDED
Status: DISCONTINUED | OUTPATIENT
Start: 2019-12-02 | End: 2019-12-04 | Stop reason: HOSPADM

## 2019-12-02 RX ORDER — FUROSEMIDE 10 MG/ML
40 INJECTION INTRAMUSCULAR; INTRAVENOUS ONCE
Status: COMPLETED | OUTPATIENT
Start: 2019-12-02 | End: 2019-12-02

## 2019-12-02 RX ORDER — CEPHALEXIN 250 MG/1
250 CAPSULE ORAL EVERY 12 HOURS SCHEDULED
Status: DISCONTINUED | OUTPATIENT
Start: 2019-12-02 | End: 2019-12-04 | Stop reason: HOSPADM

## 2019-12-02 RX ORDER — NITROGLYCERIN 0.4 MG/1
0.4 TABLET SUBLINGUAL
Status: DISCONTINUED | OUTPATIENT
Start: 2019-12-02 | End: 2019-12-04 | Stop reason: HOSPADM

## 2019-12-02 RX ORDER — ALBUTEROL SULFATE 2.5 MG/3ML
2.5 SOLUTION RESPIRATORY (INHALATION) ONCE
Status: DISCONTINUED | OUTPATIENT
Start: 2019-12-02 | End: 2019-12-04 | Stop reason: HOSPADM

## 2019-12-02 RX ORDER — METHYLPREDNISOLONE SODIUM SUCCINATE 40 MG/ML
40 INJECTION, POWDER, LYOPHILIZED, FOR SOLUTION INTRAMUSCULAR; INTRAVENOUS EVERY 12 HOURS
Status: DISCONTINUED | OUTPATIENT
Start: 2019-12-02 | End: 2019-12-04 | Stop reason: HOSPADM

## 2019-12-02 RX ORDER — POTASSIUM CHLORIDE 750 MG/1
40 CAPSULE, EXTENDED RELEASE ORAL AS NEEDED
Status: DISCONTINUED | OUTPATIENT
Start: 2019-12-02 | End: 2019-12-04 | Stop reason: HOSPADM

## 2019-12-02 RX ORDER — SENNA AND DOCUSATE SODIUM 50; 8.6 MG/1; MG/1
2 TABLET, FILM COATED ORAL 2 TIMES DAILY PRN
Status: DISCONTINUED | OUTPATIENT
Start: 2019-12-02 | End: 2019-12-04 | Stop reason: HOSPADM

## 2019-12-02 RX ORDER — POTASSIUM CHLORIDE 29.8 MG/ML
20 INJECTION INTRAVENOUS
Status: DISCONTINUED | OUTPATIENT
Start: 2019-12-02 | End: 2019-12-04 | Stop reason: HOSPADM

## 2019-12-02 RX ORDER — SODIUM CHLORIDE FOR INHALATION 7 %
4 VIAL, NEBULIZER (ML) INHALATION
Status: DISCONTINUED | OUTPATIENT
Start: 2019-12-02 | End: 2019-12-03

## 2019-12-02 RX ORDER — IPRATROPIUM BROMIDE AND ALBUTEROL SULFATE 2.5; .5 MG/3ML; MG/3ML
3 SOLUTION RESPIRATORY (INHALATION) ONCE
Status: COMPLETED | OUTPATIENT
Start: 2019-12-02 | End: 2019-12-02

## 2019-12-02 RX ADMIN — IPRATROPIUM BROMIDE AND ALBUTEROL SULFATE 3 ML: 2.5; .5 SOLUTION RESPIRATORY (INHALATION) at 07:28

## 2019-12-02 RX ADMIN — POTASSIUM CHLORIDE 40 MEQ: 750 CAPSULE, EXTENDED RELEASE ORAL at 11:40

## 2019-12-02 RX ADMIN — FUROSEMIDE 40 MG: 40 INJECTION, SOLUTION INTRAMUSCULAR; INTRAVENOUS at 18:29

## 2019-12-02 RX ADMIN — METHYLPREDNISOLONE SODIUM SUCCINATE 40 MG: 40 INJECTION, POWDER, FOR SOLUTION INTRAMUSCULAR; INTRAVENOUS at 13:15

## 2019-12-02 RX ADMIN — CEPHALEXIN 500 MG: 500 CAPSULE ORAL at 02:14

## 2019-12-02 RX ADMIN — ALBUTEROL SULFATE 2.5 MG: 2.5 SOLUTION RESPIRATORY (INHALATION) at 00:58

## 2019-12-02 RX ADMIN — VERAPAMIL HYDROCHLORIDE 240 MG: 120 TABLET, FILM COATED, EXTENDED RELEASE ORAL at 20:36

## 2019-12-02 RX ADMIN — ALBUTEROL SULFATE 2.5 MG: 2.5 SOLUTION RESPIRATORY (INHALATION) at 00:57

## 2019-12-02 RX ADMIN — METHYLPREDNISOLONE SODIUM SUCCINATE 80 MG: 125 INJECTION, POWDER, FOR SOLUTION INTRAMUSCULAR; INTRAVENOUS at 01:09

## 2019-12-02 RX ADMIN — IPRATROPIUM BROMIDE AND ALBUTEROL SULFATE 3 ML: 2.5; .5 SOLUTION RESPIRATORY (INHALATION) at 00:57

## 2019-12-02 RX ADMIN — SODIUM CHLORIDE, PRESERVATIVE FREE 10 ML: 5 INJECTION INTRAVENOUS at 05:17

## 2019-12-02 RX ADMIN — WARFARIN SODIUM 4 MG: 4 TABLET ORAL at 20:36

## 2019-12-02 RX ADMIN — IPRATROPIUM BROMIDE AND ALBUTEROL SULFATE 3 ML: 2.5; .5 SOLUTION RESPIRATORY (INHALATION) at 13:38

## 2019-12-02 RX ADMIN — CEPHALEXIN 250 MG: 250 CAPSULE ORAL at 20:36

## 2019-12-02 RX ADMIN — TORSEMIDE 20 MG: 20 TABLET ORAL at 08:20

## 2019-12-02 RX ADMIN — SODIUM CHLORIDE, PRESERVATIVE FREE 10 ML: 5 INJECTION INTRAVENOUS at 20:36

## 2019-12-02 RX ADMIN — POTASSIUM CHLORIDE 40 MEQ: 750 CAPSULE, EXTENDED RELEASE ORAL at 14:35

## 2019-12-02 RX ADMIN — PREDNISONE 20 MG: 20 TABLET ORAL at 08:20

## 2019-12-02 RX ADMIN — IPRATROPIUM BROMIDE AND ALBUTEROL SULFATE 3 ML: 2.5; .5 SOLUTION RESPIRATORY (INHALATION) at 17:46

## 2019-12-02 RX ADMIN — SODIUM CHLORIDE, PRESERVATIVE FREE 10 ML: 5 INJECTION INTRAVENOUS at 08:22

## 2019-12-02 RX ADMIN — IPRATROPIUM BROMIDE AND ALBUTEROL SULFATE 3 ML: 2.5; .5 SOLUTION RESPIRATORY (INHALATION) at 21:01

## 2019-12-02 NOTE — ED NOTES
This nurse ambulated pt in hallway on room air, after approximately 25 feet pt became SOA and had an oxygen saturation of 86%. Pt assisted back to room and placed back on oxygen.      Jordan Shen RN  12/02/19 0136

## 2019-12-02 NOTE — ED NOTES
"Nursing report ED to floor  Agnieszka Rizzo  89 y.o.  female    HPI (triage note):   Chief Complaint   Patient presents with   • Shortness of Breath       Admitting doctor:   Pravin Jackson MD    Admitting diagnosis:   The primary encounter diagnosis was Moderate asthma with acute exacerbation, unspecified whether persistent. A diagnosis of UTI (urinary tract infection), uncomplicated was also pertinent to this visit.    Code status:   Current Code Status     Date Active Code Status Order ID Comments User Context       12/2/2019 02:25 CPR [id number]  Pravin Jackson MD ED       Questions for Current Code Status     Question Answer Comment    Code Status CPR     Medical Interventions (Level of Support Prior to Arrest) Full           Allergies:   Amlodipine besylate; Aspirin; Bactrim [sulfamethoxazole-trimethoprim]; Erythromycin; Levaquin [levofloxacin]; Macrobid [nitrofurantoin]; and Ramipril    Weight:       12/02/19 0112   Weight: 58.5 kg (129 lb)       Most recent vitals:   Vitals:    12/01/19 2335 12/02/19 0057 12/02/19 0110 12/02/19 0112   BP: 155/83  144/71    BP Location: Right arm      Patient Position: Sitting      Pulse: 83 82 88    Resp: 16 18 16    Temp: 97.7 °F (36.5 °C)      TempSrc: Tympanic      SpO2: 96% 95% 96%    Weight:    58.5 kg (129 lb)   Height: 160 cm (63\")          Active LDAs/IV Access:   Lines, Drains & Airways    Active LDAs     Name:   Placement date:   Placement time:   Site:   Days:    Peripheral IV 12/01/19 2337 Left Antecubital   12/01/19 2337    Antecubital   less than 1                Labs (abnormal labs have a star):   Labs Reviewed   COMPREHENSIVE METABOLIC PANEL - Abnormal; Notable for the following components:       Result Value    Glucose 131 (*)     Potassium 3.3 (*)     ALT (SGPT) 40 (*)     AST (SGOT) 42 (*)     Anion Gap 15.3 (*)     All other components within normal limits    Narrative:     GFR Normal >60  Chronic Kidney Disease <60  Kidney Failure <15   PROTIME-INR " - Abnormal; Notable for the following components:    Protime 32.5 (*)     INR 3.20 (*)     All other components within normal limits   CBC WITH AUTO DIFFERENTIAL - Abnormal; Notable for the following components:    RBC 3.26 (*)     Hemoglobin 10.4 (*)     Hematocrit 31.6 (*)     Lymphocyte % 18.6 (*)     Eosinophil % 0.1 (*)     Immature Grans % 0.7 (*)     Monocytes, Absolute 0.98 (*)     Immature Grans, Absolute 0.06 (*)     All other components within normal limits   URINALYSIS W/ MICROSCOPIC IF INDICATED (NO CULTURE) - Abnormal; Notable for the following components:    Blood, UA Small (1+) (*)     Leuk Esterase, UA Moderate (2+) (*)     All other components within normal limits   URINALYSIS, MICROSCOPIC ONLY - Abnormal; Notable for the following components:    WBC, UA 21-30 (*)     Bacteria, UA 4+ (*)     All other components within normal limits   TROPONIN (IN-HOUSE) - Normal    Narrative:     Troponin T Reference Range:  <= 0.03 ng/mL-   Negative for AMI  >0.03 ng/mL-     Abnormal for myocardial necrosis.  Clinicians would have to utilize clinical acumen, EKG, Troponin and serial changes to determine if it is an Acute Myocardial Infarction or myocardial injury due to an underlying chronic condition.    BNP (IN-HOUSE) - Normal    Narrative:     Among patients with dyspnea, NT-proBNP is highly sensitive for the detection of acute congestive heart failure. In addition NT-proBNP of <300 pg/ml effectively rules out acute congestive heart failure with 99% negative predictive value.   RESPIRATORY PANEL, PCR   URINE CULTURE   CBC AND DIFFERENTIAL    Narrative:     The following orders were created for panel order CBC & Differential.  Procedure                               Abnormality         Status                     ---------                               -----------         ------                     CBC Auto Differential[[id number]]        Abnormal            Final result                 Please view results for  these tests on the individual orders.       EKG:   ECG 12 Lead   Preliminary Result   HEART RATE= 80  bpm   RR Interval= 754  ms   UT Interval= 171  ms   P Horizontal Axis= -14  deg   P Front Axis= 69  deg   QRSD Interval= 89  ms   QT Interval= 412  ms   QRS Axis= 26  deg   T Wave Axis= 42  deg   - ABNORMAL ECG -   Sinus rhythm   Supraventricular bigeminy   Consider left ventricular hypertrophy   Borderline T abnormalities, anterior leads   Electronically Signed By:    Date and Time of Study: 2019-12-02 00:21:14          Meds given in ED:   Medications   sodium chloride 0.9 % flush 10 mL (not administered)   albuterol (PROVENTIL) nebulizer solution 0.083% 2.5 mg/3mL ( Nebulization Canceled Entry 12/2/19 0058)   verapamil ER (VERELAN) 24 hr capsule capsule sustained-release 24 hr 240 mg (not administered)   predniSONE (DELTASONE) tablet 20 mg (not administered)   torsemide (DEMADEX) tablet 20 mg (not administered)   ipratropium-albuterol (DUO-NEB) nebulizer solution 3 mL (not administered)   methylPREDNISolone sodium succinate (SOLU-Medrol) injection 40 mg (not administered)   sodium chloride 0.9 % flush 10 mL (not administered)   sodium chloride 0.9 % flush 10 mL (not administered)   senna-docusate sodium (SENOKOT-S) 8.6-50 MG tablet 2 tablet (not administered)   nitroglycerin (NITROSTAT) SL tablet 0.4 mg (not administered)   ipratropium-albuterol (DUO-NEB) nebulizer solution 3 mL (3 mL Nebulization Given 12/2/19 0057)   albuterol (PROVENTIL) nebulizer solution 0.083% 2.5 mg/3mL (2.5 mg Nebulization Given 12/2/19 0058)   methylPREDNISolone sodium succinate (SOLU-Medrol) injection 80 mg (80 mg Intravenous Given 12/2/19 0109)   cephalexin (KEFLEX) capsule 500 mg (500 mg Oral Given 12/2/19 0214)       Imaging results:  Xr Chest 2 View    Result Date: 12/2/2019  Mild edema/CHF         Ambulatory status:   - ASSIST    Social issues:   Social History     Socioeconomic History   • Marital status:      Spouse name:  Not on file   • Number of children: Not on file   • Years of education: Not on file   • Highest education level: Not on file   Tobacco Use   • Smoking status: Never Smoker   • Smokeless tobacco: Never Used   • Tobacco comment: caffiene daily   Substance and Sexual Activity   • Alcohol use: Yes     Comment: occasional   • Drug use: No   • Sexual activity: Yes     Partners: Male          Jeronimo Lowry RN  12/02/19 0231

## 2019-12-02 NOTE — PLAN OF CARE
Problem: Patient Care Overview  Goal: Plan of Care Review   12/02/19 6522   Coping/Psychosocial   Plan of Care Reviewed With patient;daughter   Plan of Care Review   Progress improving   OTHER   Outcome Summary Pt came from CCU; pt has been walking around the nursing station keeping O2 at 93 and above with no complanints

## 2019-12-02 NOTE — PROGRESS NOTES
Pharmacy Consult Day #1: Warfarin Dosing/ Monitoring    Agnieszka Rizzo is a 89 y.o. female, who has a past medical history of Aspergillus (CMS/HCC), Asthma, Atrial fibrillation (CMS/HCC), Atrial flutter (CMS/HCC), Bronchiectasis (CMS/HCC), C. difficile diarrhea (3/11/2017), CAD (coronary artery disease), Chronic diastolic CHF (congestive heart failure) (CMS/HCC), Colitis, Cough, Cryoglobulinemia (CMS/HCC), Dyspnea on exertion, Fall, Hyperlipidemia, Hypertension, Hyponatremia, Hypoxia, Infectious viral hepatitis, Left shoulder pain, Leg swelling, Lesion of lung, Mild tricuspid regurgitation, MR (mitral regurgitation), MVP (mitral valve prolapse), Permanent atrial fibrillation, Pneumonia with the fungal infection aspergillosis (CMS/HCC) (1/6/2017), Pneumothorax, SOB (shortness of breath), UTI (urinary tract infection), and Wheeze.    Social History     Tobacco Use   • Smoking status: Never Smoker   • Smokeless tobacco: Never Used   • Tobacco comment: caffiene daily   Substance Use Topics   • Alcohol use: Yes     Comment: occasional   • Drug use: No     Results from last 7 days   Lab Units 12/02/19  0012   INR  3.20*   HEMOGLOBIN g/dL 10.4*   HEMATOCRIT % 31.6*   PLATELETS 10*3/mm3 276     Results from last 7 days   Lab Units 12/02/19  1542 12/02/19  0012   SODIUM mmol/L  --  144   POTASSIUM mmol/L 4.1 3.3*   CHLORIDE mmol/L  --  102   CO2 mmol/L  --  26.7   BUN mg/dL  --  17   CREATININE mg/dL  --  0.69   CALCIUM mg/dL  --  9.2   BILIRUBIN mg/dL  --  0.4   ALK PHOS U/L  --  89   ALT (SGPT) U/L  --  40*   AST (SGOT) U/L  --  42*   GLUCOSE mg/dL  --  131*     Anticoagulation history: Per [facility] dates dated 11/18, [facility]  Consulting provider: Dr. Aubrey Pimentel  Start date: 12/2/19  Indication: Atrial fibrillation  Target INR: 2.5-3.5  Expected duration: Lifelong  Bridge Therapy: None                Date 12/2             INR 3.2            Warfarin dose 4 mg              Potential drug interactions:   1. Cephalexin (major-hospital only)-may increase risk of bleeding due to disruption of vitamin K synthesis.  2. Torsemide (major)-may increase risk of bleeding due to competitive inhibition of [id number] warfarin metabolism and/or displacement of warfarin by torsemide from protein binding sites.  3. Zetia (moderate-home use only)-may result in increased prothrombin time or INR  4. Methylprednisolone/Prednisone (moderate)-may result in increased risk of bleeding OR diminished effects of warfarin    Relevant nutrition status: Regular diet    Education complete: No/ Date:     Assessment:    Patient's INR is currently therapeutic at 3.2 (INR goal is 2.5-  3.5). Patient has been started on cephalexin while in the hospital, so will watch INR closely to see if it starts to trend up.    Plan:   1. Will give a dose of 4 mg tonight, then reassess tomorrow morning.  2. Will monitor daily PT/INR  3. Will follow up tomorrow [facility]    Pharmacy will continue to follow until discharge or discontinuation of warfarin.   Archana Sarkar, Pharm.D., BCPS  12/2/2019

## 2019-12-02 NOTE — ED PROVIDER NOTES
MD ATTESTATION NOTE    Pt presents to the ED complaining of shortness of air that began yesterday. Pt reports associated productive cough. Reviewed pt's lab and imaging results, including fluid on the lungs. Dr. Tucker is the patient's pulmonologist.    On exam, the pt is resting comfortably, in no distress, and without focal neurologic deficit. No respiratory distress. Coarse rales and rhonchi throughout all fields. Cardiovascular exam is within normal limits without murmur.    I agree with the plan to admit the patient for further treatment and management of CHF.    The JOHANA and I have discussed this patient's history, physical exam, and treatment plan.  I have reviewed the documentation and personally had a face to face interaction with the patient. I affirm the documentation and agree with the treatment and plan.  The attached note describes my personal findings.    Documentation assistance provided by danii Mccurdy for Dr. Robbie MD.  Information recorded by the scribe was done at my direction and has been verified and validated by me.     Iris Mccurdy  12/02/19 9937       Catarino Avalos MD  12/03/19 4082

## 2019-12-02 NOTE — ED PROVIDER NOTES
EMERGENCY DEPARTMENT ENCOUNTER    Room Number:  N341/1  Date of encounter:  12/2/2019  PCP: Matt Rose MD  Historian: patient    HPI:  Chief Complaint: SOA    Context: Agnieszka Rizzo is a 89 y.o. female who presents via EMS with hx of asthma to the ED c/o two episodes of SOA yesterday Pt states that first episode occurred at 0300 at which time she used her inhaler which resolved the sx.  Pt states that she has another episode tonight which she was unable to improved with inhalers at home. Pt states that sx are worse with laying flat and coughing. Pt also complains of productive cough several days. Pt denies cp and fever. Pt is on 2L supplemental oxygen at baseline at night. Pt is on warfarin for hx of afib. Family at bedside states pt contacted  and  (pulmonology) 3 days ago and began steroids 2 days ago.  Hx of PNA and asthma.       PAST MEDICAL HISTORY  Active Ambulatory Problems     Diagnosis Date Noted   • Benign essential hypertension 12/01/2016   • Chronic coronary artery disease 12/01/2016   • Hyperlipidemia 12/01/2016   • Mitral valve insufficiency 12/01/2016   • Ventricular premature beats 12/01/2016   • Ventricular tachycardia (CMS/[facility]) 12/01/2016   • Persistent atrial fibrillation 12/01/2016   • Acid-fast bacteria present 01/09/2017   • DNR (do not resuscitate) 03/12/2017   • Sepsis (CMS/[facility]) 03/12/2017   • Chronic diastolic CHF (congestive heart failure) (CMS/[facility]) 03/12/2017   • CHF (congestive heart failure) (CMS/[facility]) 03/22/2017   • Asymptomatic bacteriuria 04/03/2017   • Iron deficiency anemia 04/04/2017   • Bronchiectasis (CMS/[facility]) 04/17/2017   • Pneumonia of both lungs due to infectious organism 05/31/2017   • KINA (mycobacterium avium-intracellulare) (CMS/[facility]) 06/09/2017   • Atrial fibrillation with rapid ventricular response (CMS/[facility]) 06/09/2017   • Anxiety 06/20/2017   • Exposure to hepatitis A 04/20/2018   • Medicare annual wellness visit, subsequent 04/18/2019   • Abdominal  pain 09/23/2019   • Herpes infection 11/19/2019     Resolved Ambulatory Problems     Diagnosis Date Noted   • Pneumothorax of right lung after biopsy 11/12/2016   • Pulmonary aspergillosis (CMS/[facility]) 11/16/2016   • Heart failure, diastolic, with acute decompensation (CMS/[facility]) 12/01/2016   • Pneumonia 01/01/2017   • Pneumonia with the fungal infection aspergillosis (CMS/HCC) 01/06/2017   • Acute respiratory failure with hypoxia (CMS/[facility]) 01/09/2017   • Hyponatremia 02/08/2017   • C. difficile colitis 03/11/2017   • Pancolitis (CMS/[facility]) 04/02/2017   • Hypokalemia 04/04/2017     Past Medical History:   Diagnosis Date   • Aspergillus (CMS/[facility])    • Asthma    • Atrial fibrillation (CMS/[facility])    • Atrial flutter (CMS/[facility])    • Bronchiectasis (CMS/[facility])    • C. 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[facility])    • Dyspnea on exertion    • Fall    • Hyperlipidemia    • Hypertension    • Hyponatremia    • Hypoxia    • Infectious viral hepatitis    • Left shoulder pain    • Leg swelling    • Lesion of lung    • Mild tricuspid regurgitation    • MR (mitral regurgitation)    • MVP (mitral valve prolapse)    • Permanent atrial fibrillation    • Pneumonia with the fungal infection aspergillosis (CMS/[facility]) 1/6/2017   • Pneumothorax    • SOB (shortness of breath)    • UTI (urinary tract infection)    • Wheeze          PAST SURGICAL HISTORY  Past Surgical History:   Procedure Laterality Date   • BRONCHOSCOPY N/A 11/12/2016    Procedure: BRONCHOSCOPY WITH FLUORO, BRUSHINGS, BAL, AND BIOPSIES;  Surgeon: Rogelio Tucker MD;  Location: [facility] ENDOSCOPY;  Service:    • BRONCHOSCOPY Bilateral 6/3/2017    Procedure: BRONCHOSCOPY with BAL ;  Surgeon: Sung King MD;  Location: [facility] ENDOSCOPY;  Service:    • CATARACT EXTRACTION EXTRACAPSULAR W/ INTRAOCULAR LENS IMPLANTATION     • COLONOSCOPY      2013   • D&C WITH  SUCTION     • HYSTERECTOMY     • KNEE ARTHROSCOPY Left          FAMILY HISTORY  Family History   Problem Relation Age of Onset   • Hypertension Mother    • Stroke Mother    • Hypertension Father    • Cancer Son    • Cancer Brother          SOCIAL HISTORY  Social History     Socioeconomic History   • Marital status:      Spouse name: Not on file   • Number of children: Not on file   • Years of education: Not on file   • Highest education level: Not on file   Tobacco Use   • Smoking status: Never Smoker   • Smokeless tobacco: Never Used   • Tobacco comment: caffiene daily   Substance and Sexual Activity   • Alcohol use: Yes     Comment: occasional   • Drug use: No   • Sexual activity: Yes     Partners: Male         ALLERGIES  Amlodipine besylate; Aspirin; Bactrim [sulfamethoxazole-trimethoprim]; Erythromycin; Levaquin [levofloxacin]; Macrobid [nitrofurantoin]; and Ramipril        REVIEW OF SYSTEMS  Review of Systems   Constitutional: Negative for fever.   HENT: Negative for sore throat.    Eyes: Negative.    Respiratory: Positive for cough (productive) and shortness of breath.    Cardiovascular: Negative for chest pain.   Gastrointestinal: Negative for abdominal pain, diarrhea and vomiting.   Genitourinary: Negative for dysuria.   Musculoskeletal: Negative for neck pain.   Skin: Negative for rash.   Allergic/Immunologic: Negative.    Neurological: Negative for weakness, numbness and headaches.   Hematological: Negative.    Psychiatric/Behavioral: Negative.    All other systems reviewed and are negative.           PHYSICAL EXAM    I have reviewed the triage vital signs and nursing notes.    ED Triage Vitals [12/01/19 2335]   Temp Heart Rate Resp BP SpO2   97.7 °F (36.5 °C) 83 16 155/83 96 %      Temp src Heart Rate Source Patient Position BP Location FiO2 (%)   Tympanic Monitor Sitting Right arm --       GENERAL: well developed, well nourished in no acute distress  HENT: NCAT, neck supple, trachea midline  EYES:  no scleral icterus, PERRL, normal conjunctiva  CV: regular rhythm, regular rate, no murmur  RESPIRATORY: unlabored effort, talking in full sentences wheezes, rhonchi, and mild rales appreciated   ABDOMEN: soft, non-tender, non-distended, bowel sounds present  MUSCULOSKELETAL: no gross deformity  NEURO: alert,  sensory and motor function of extremities grossly intact, speech clear, mental status normal/baseline  SKIN: warm, dry, no rash  PSYCH:  Appropriate mood and affect    Vital signs and nursing notes reviewed.          LAB RESULTS  Recent Results (from the past 24 hour(s))   Comprehensive Metabolic Panel    Collection Time: 12/02/19 12:12 AM   Result Value Ref Range    Glucose 131 (H) 65 - 99 mg/dL    BUN 17 8 - 23 mg/dL    Creatinine 0.69 0.57 - 1.00 mg/dL    Sodium 144 136 - 145 mmol/L    Potassium 3.3 (L) 3.5 - 5.2 mmol/L    Chloride 102 98 - 107 mmol/L    CO2 26.7 22.0 - 29.0 mmol/L    Calcium 9.2 8.6 - 10.5 mg/dL    Total Protein 7.5 6.0 - 8.5 g/dL    Albumin 3.70 3.50 - 5.20 g/dL    ALT (SGPT) 40 (H) 1 - 33 U/L    AST (SGOT) 42 (H) 1 - 32 U/L    Alkaline Phosphatase 89 39 - 117 U/L    Total Bilirubin 0.4 0.2 - 1.2 mg/dL    eGFR Non African Amer 80 >60 mL/min/1.73    Globulin 3.8 gm/dL    A/G Ratio 1.0 g/dL    BUN/Creatinine Ratio 24.6 7.0 - 25.0    Anion Gap 15.3 (H) 5.0 - 15.0 mmol/L   Troponin    Collection Time: 12/02/19 12:12 AM   Result Value Ref Range    Troponin T <0.010 0.000 - 0.030 ng/mL   BNP    Collection Time: 12/02/19 12:12 AM   Result Value Ref Range    proBNP 771.8 5.0-1,800.0 pg/mL   Protime-INR    Collection Time: 12/02/19 12:12 AM   Result Value Ref Range    Protime 32.5 (H) 11.7 - 14.2 Seconds    INR 3.20 (H) 0.90 - 1.10   CBC Auto Differential    Collection Time: 12/02/19 12:12 AM   Result Value Ref Range    WBC 8.87 3.40 - 10.80 10*3/mm3    RBC 3.26 (L) 3.77 - 5.28 10*6/mm3    Hemoglobin 10.4 (L) 12.0 - 15.9 g/dL    Hematocrit 31.6 (L) 34.0 - 46.6 %    MCV 96.9 79.0 - 97.0 fL    MCH  31.9 26.6 - 33.0 pg    MCHC 32.9 31.5 - 35.7 g/dL    RDW 14.6 12.3 - 15.4 %    RDW-SD 51.5 37.0 - 54.0 fl    MPV 10.1 6.0 - 12.0 fL    Platelets 276 140 - 450 10*3/mm3    Neutrophil % 69.1 42.7 - 76.0 %    Lymphocyte % 18.6 (L) 19.6 - 45.3 %    Monocyte % 11.0 5.0 - 12.0 %    Eosinophil % 0.1 (L) 0.3 - 6.2 %    Basophil % 0.5 0.0 - 1.5 %    Immature Grans % 0.7 (H) 0.0 - 0.5 %    Neutrophils, Absolute 6.13 1.70 - 7.00 10*3/mm3    Lymphocytes, Absolute 1.65 0.70 - 3.10 10*3/mm3    Monocytes, Absolute 0.98 (H) 0.10 - 0.90 10*3/mm3    Eosinophils, Absolute 0.01 0.00 - 0.40 10*3/mm3    Basophils, Absolute 0.04 0.00 - 0.20 10*3/mm3    Immature Grans, Absolute 0.06 (H) 0.00 - 0.05 10*3/mm3    nRBC 0.1 0.0 - 0.2 /100 WBC   Urinalysis With Microscopic If Indicated (No Culture) - Urine, Clean Catch    Collection Time: 12/02/19  1:46 AM   Result Value Ref Range    Color, UA Yellow Yellow, Straw    Appearance, UA Clear Clear    pH, UA 7.5 5.0 - 8.0    Specific Gravity, UA 1.008 1.005 - 1.030    Glucose, UA Negative Negative    Ketones, UA Negative Negative    Bilirubin, UA Negative Negative    Blood, UA Small (1+) (A) Negative    Protein, UA Negative Negative    Leuk Esterase, UA Moderate (2+) (A) Negative    Nitrite, UA Negative Negative    Urobilinogen, UA 0.2 E.U./dL 0.2 - 1.0 E.U./dL   Urinalysis, Microscopic Only - Urine, Clean Catch    Collection Time: 12/02/19  1:46 AM   Result Value Ref Range    RBC, UA 0-2 None Seen, 0-2 /HPF    WBC, UA 21-30 (A) None Seen, 0-2 /HPF    Bacteria, UA 4+ (A) None Seen /HPF    Squamous Epithelial Cells, UA 0-2 None Seen, 0-2 /HPF    Hyaline Casts, UA 0-2 None Seen /LPF    Methodology Automated Microscopy    Respiratory Panel, PCR - Swab, Nasopharynx    Collection Time: 12/02/19  2:14 AM   Result Value Ref Range    ADENOVIRUS, PCR Not Detected Not Detected    Coronavirus 229E Not Detected Not Detected    Coronavirus HKU1 Not Detected Not Detected    Coronavirus NL63 Not Detected Not  Detected    Coronavirus OC43 Not Detected Not Detected    Human Metapneumovirus Not Detected Not Detected    Human Rhinovirus/Enterovirus Not Detected Not Detected    Influenza B PCR Not Detected Not Detected    Parainfluenza Virus 1 Not Detected Not Detected    Parainfluenza Virus 2 Not Detected Not Detected    Parainfluenza Virus 3 Not Detected Not Detected    Parainfluenza Virus 4 Not Detected Not Detected    Bordetella pertussis pcr Not Detected Not Detected    Influenza A H1 2009 PCR Not Detected Not Detected    Chlamydophila pneumoniae PCR Not Detected Not Detected    Mycoplasma pneumo by PCR Not Detected Not Detected    Influenza A PCR Not Detected Not Detected    Influenza A H3 Not Detected Not Detected    Influenza A H1 Not Detected Not Detected    RSV, PCR Not Detected Not Detected    Bordetella parapertussis PCR Not Detected Not Detected   POC Glucose Once    Collection Time: 12/02/19  4:14 AM   Result Value Ref Range    Glucose 201 (H) 70 - 130 mg/dL       Ordered the above labs and independently reviewed the results.        RADIOLOGY  Xr Chest 2 View    Result Date: 12/2/2019  PA AND LATERAL CHEST X-RAY  HISTORY: SOA  COMPARISON: 07/25/2019.  FINDINGS: PA and lateral views of the chest were obtained. The lungs are well inflated. Mild pulmonary vascular congestion and peripheral interstitial prominence suggest mild edema. Heart is enlarged. No significant pleural fluid. Extensive vascular calculi. Generalized osteopenia.        Mild edema/CHF  This report was finalized on 12/2/2019 5:26 AM by Karel Meyer M.D.        I ordered the above noted radiological studies. Independently reviewed by me and discussed with radiologist.  See dictation above for official radiology interpretation.      PROCEDURES    Procedures      EKG          EKG time: 0021  Rhythm/Rate: NSR 80 BPM  P waves and NC: unremarkable  QRS, axis: normal axis  ST and T waves: two runs of bigeminy, borderline T wave abnormalities  anteriorly    Interpreted Contemporaneously by me, independently viewed  changed compared to prior June 2017 as pt is no longer in afib    MEDICATIONS GIVEN IN ER    Medications   sodium chloride 0.9 % flush 10 mL (not administered)   albuterol (PROVENTIL) nebulizer solution 0.083% 2.5 mg/3mL ( Nebulization Canceled Entry 12/2/19 0058)   verapamil SR (CALAN-SR) CR tablet 240 mg (not administered)   predniSONE (DELTASONE) tablet 20 mg (not administered)   torsemide (DEMADEX) tablet 20 mg (not administered)   ipratropium-albuterol (DUO-NEB) nebulizer solution 3 mL (not administered)   methylPREDNISolone sodium succinate (SOLU-Medrol) injection 40 mg (40 mg Intravenous Not Given 12/2/19 0517)   sodium chloride 0.9 % flush 10 mL (10 mL Intravenous Given 12/2/19 0517)   sodium chloride 0.9 % flush 10 mL (not administered)   senna-docusate sodium (SENOKOT-S) 8.6-50 MG tablet 2 tablet (not administered)   nitroglycerin (NITROSTAT) SL tablet 0.4 mg (not administered)   ipratropium-albuterol (DUO-NEB) nebulizer solution 3 mL (3 mL Nebulization Given 12/2/19 0057)   albuterol (PROVENTIL) nebulizer solution 0.083% 2.5 mg/3mL (2.5 mg Nebulization Given 12/2/19 0058)   methylPREDNISolone sodium succinate (SOLU-Medrol) injection 80 mg (80 mg Intravenous Given 12/2/19 0109)   cephalexin (KEFLEX) capsule 500 mg (500 mg Oral Given 12/2/19 0214)         PROGRESS, DATA ANALYSIS, CONSULTS, AND MEDICAL DECISION MAKING    All labs have been independently reviewed by me.  All radiology studies have been reviewed by me and discussed with radiologist dictating report.   EKG's independently reviewed by me.  Discussion below represents my analysis of pertinent findings related to patient's condition, differential diagnosis, treatment plan and final disposition.         0038-Discussed with pt and family the barbara to review lab work and CXR. EKG unremarkable. Will order breathing treatment. The patient and family indicates understanding of these  issues and agrees with the plan.    0125-Rechecked pt. Pt is resting comfortably. Notified pt of unremarkable lab work and CXR consistent with COPD. Discussed the plan to ambulate pt on RA baseline for hypoxia. Pt understands and agrees with the plan, all questions answered.    0135-Per RN, pt's walking O2 sat dropped quickly into 80s on RA. Pt improved with rest and 2L supplemental oxygen. Discussed pt case with MD Raffi who agrees with the plan of care.     0145-Rechecked pt. Pt is resting. O2 sats improved to 90s% on 2L supplemental oxygen after being hypoxic with ambulation. Family at bedside. Notified pt of UA-UTI. Discussed the plan to admit pt for further evaluation and care. Pt understands and agrees with the plan, all questions answered.    0201-Discussed pt case with  (pulmonology) who will admit pt to telemetry for further evaluation and care.    AS OF 6:40 AM VITALS:    BP - 149/72  HR - 85  TEMP - 97.9 °F (36.6 °C) (Oral)  02 SATS - (!) 86%        DIAGNOSIS  Final diagnoses:   Moderate asthma with acute exacerbation, unspecified whether persistent   UTI (urinary tract infection), uncomplicated         DISPOSITION  ADMISSION    Discussed treatment plan and reason for admission with pt/family and admitting physician.  Pt/family voiced understanding of the plan for admission for further testing/treatment as needed.         --  Documentation assistance provided by danii Jay for GANGA Woodruff.  Information recorded by the scribe was done at my direction and has been verified and validated by me.           Sana Jay  12/02/19 0531       Jay Giles III, PA  12/02/19 3007

## 2019-12-02 NOTE — ED TRIAGE NOTES
Pt to ER via EMS from home. Per EMS, pt had SOA started at 0300 this morning, had inhaler and SOA improved. Pt had another SOA episode tonight. Pt also had productive cough since Tuesday. Pt on 2L NC at home regularly.

## 2019-12-02 NOTE — PLAN OF CARE
Problem: Patient Care Overview  Goal: Plan of Care Review  Outcome: Ongoing (interventions implemented as appropriate)   12/02/19 0553   Coping/Psychosocial   Plan of Care Reviewed With patient   Plan of Care Review   Progress improving   OTHER   Outcome Summary Pt admitted to CCU as overflow. A/O, very pleasant. Wearing 2LO2 now. Normally only wears 2LO2 @ night. Lungs w/ bilateral wheezes and rhonchi throughout. Frequent productive cough. Asthma Hx. Hx of AF. On AC. Having occasional PVCs and PACs, but has remained in NSR. VSS. afebrile. IV Steroids.      Goal: Individualization and Mutuality  Outcome: Ongoing (interventions implemented as appropriate)    Goal: Discharge Needs Assessment  Outcome: Ongoing (interventions implemented as appropriate)    Goal: Interprofessional Rounds/Family Conf  Outcome: Ongoing (interventions implemented as appropriate)      Problem: Asthma (Adult)  Goal: Signs and Symptoms of Listed Potential Problems Will be Absent, Minimized or Managed (Asthma)  Outcome: Ongoing (interventions implemented as appropriate)

## 2019-12-02 NOTE — H&P
CC: shortness of breath    HPI:  Mrs. Rizzo is a 89yo WF with a history of moderate persistent asthma follow by Dr. Tucker in our office. She is usually on advair and does vest and hypertonic nebs at home.  She  Presented to the ER last night with sudden onset of shortness of breath occurred with a couple episodes of more acute worsening as well.  She got relief from  Her inhalers initially but then got less response so she came to the ER.  She does have associated productive cough and chest tightness.  She did start an oral steroid as an outpatient but has failed to improve. Symptoms are moderate and worse on exertion. She does not report increase in sputum volume or change in sputum color.   She reports she coughs more when supine.    She does  Have dysuria        Past Medical History:   Diagnosis Date   • Aspergillus (CMS/HCC)    • Asthma    • Atrial fibrillation (CMS/HCC)     chronic   • Atrial flutter (CMS/HCC)    • Bronchiectasis (CMS/HCC)    • C. difficile diarrhea 3/11/2017   • CAD (coronary artery disease)     nonobstructive   • Chronic diastolic CHF (congestive heart failure) (CMS/HCC)    • Colitis    • Cough    • Cryoglobulinemia (CMS/HCC)    • Dyspnea on exertion    • Fall    • Hyperlipidemia    • Hypertension    • Hyponatremia    • Hypoxia    • Infectious viral hepatitis     AGE 13   • Left shoulder pain    • Leg swelling    • Lesion of lung    • Mild tricuspid regurgitation    • MR (mitral regurgitation)     mild   • MVP (mitral valve prolapse)    • Permanent atrial fibrillation    • Pneumonia with the fungal infection aspergillosis (CMS/HCC) 1/6/2017   • Pneumothorax    • SOB (shortness of breath)    • UTI (urinary tract infection)    • Wheeze     mild     Social History     Socioeconomic History   • Marital status:      Spouse name: Not on file   • Number of children: Not on file   • Years of education: Not on file   • Highest education level: Not on file   Tobacco Use   • Smoking status:  Never Smoker   • Smokeless tobacco: Never Used   • Tobacco comment: caffiene daily   Substance and Sexual Activity   • Alcohol use: Yes     Comment: occasional   • Drug use: No   • Sexual activity: Yes     Partners: Male     Family History   Problem Relation Age of Onset   • Hypertension Mother    • Stroke Mother    • Hypertension Father    • Cancer Son    • Cancer Brother      Meds: reviewed as per chart  ALL: list of 7 reviewed as per chart  ROS:  Constitutional: Negative for fever.   HENT: Negative for sore throat.    Eyes: Negative.    Respiratory: Positive for cough (productive) and shortness of breath.    Cardiovascular: Negative for chest pain.   Gastrointestinal: Negative for abdominal pain, diarrhea and vomiting.   Genitourinary: +dysuria.   Musculoskeletal: Negative for neck pain.   Skin: Negative for rash.   Allergic/Immunologic: Negative.    Neurological: Negative for weakness, numbness and headaches.   Hematological: Negative.    Psychiatric/Behavioral: Negative.    All other systems reviewed and are negative.    Vital Sign Min/Max for last 24 hours  Temp  Min: 97.4 °F (36.3 °C)  Max: 98.1 °F (36.7 °C)   BP  Min: 133/64  Max: 155/83   Pulse  Min: 65  Max: 92   Resp  Min: 16  Max: 24   SpO2  Min: 86 %  Max: 99 %   Flow (L/min)  Min: 2  Max: 2   Weight  Min: 56.5 kg (124 lb 9 oz)  Max: 58.5 kg (129 lb)     GEN:  appears ill,  AxOx3  HEENT: PERRL, EOMI, no icterus, mmm, + jvd, trachea midline, neck supple  CHEST: decreased, coarse bilat, + wheezes, no crackles, no use of accessory muscles  CV: RRR, no m/g/r  ABD: soft, nt, nd +bs, no hepatosplenomegaly  EXT: no c/c/e  SKIN: no rashes, no xanthomas, nl turgor  LYMPH: no palpable cervical or supraclavicular lymphadenopathy  NEURO: CN 2-12 intact and symmetric bilaterally  PSYCH: nl affect, nl orientation, nl judgement, nl mood  MSK: some kyphosis is present, 5/5 strength ue and le bilaterally    Labs: 12/2: reviewed:  rpp negative  U/a: keith mod; wbc 21-30;  bac 4+  Na 144  k 3.3  Bicarb 26  Trop 0.01  probnp 771  inr 3.2  Wbc 8.8 (eos 0.1; 69% neutrophils)  hgb 10.4 (mcv 96)  plts 276    CXR: cardiomegaly and mild pulm vasc congestion    12/20/18: stress test: low risk and ef 68%    Ct chest 11/27/2018: to follow up MAC: some persistent opacities    A/P:  1. Moderate persistent asthma with acute exacerbation -- causative agent unclear; continue iv steroids and bronchodilators.  2. UTI: keflex  3. Acute hypoxemic respiratory failure -- continue oxygen as needed  4. Acute on chronic diastolic chf -- will give a dose of lasix  5. Hypokalemia -- replace   6. Anemia, normocytic  7. Permanent afib -- hold coumadin today slightly supratherapeutic, pharmacy to dose while here.   8. Bronchiectasis --flutter, nebulizer with hypertonic saline  9. H/o mycobacterium avium infection  10. H/o aspergillus   11. H/o c. Diff -- try and avoid antibiotics    Possibly home tomorrow if she is doing well, increase pulmonary toilet.

## 2019-12-03 LAB
GLUCOSE BLDC GLUCOMTR-MCNC: 175 MG/DL (ref 70–130)
GLUCOSE BLDC GLUCOMTR-MCNC: 191 MG/DL (ref 70–130)
INR PPP: 3.81 (ref 0.9–1.1)
PROTHROMBIN TIME: 37.3 SECONDS (ref 11.7–14.2)

## 2019-12-03 PROCEDURE — 94799 UNLISTED PULMONARY SVC/PX: CPT

## 2019-12-03 PROCEDURE — 85610 PROTHROMBIN TIME: CPT | Performed by: INTERNAL MEDICINE

## 2019-12-03 PROCEDURE — 96376 TX/PRO/DX INJ SAME DRUG ADON: CPT

## 2019-12-03 PROCEDURE — 25010000002 METHYLPREDNISOLONE PER 40 MG: Performed by: INTERNAL MEDICINE

## 2019-12-03 PROCEDURE — G0378 HOSPITAL OBSERVATION PER HR: HCPCS

## 2019-12-03 PROCEDURE — 82962 GLUCOSE BLOOD TEST: CPT

## 2019-12-03 RX ORDER — SODIUM CHLORIDE FOR INHALATION 7 %
4 VIAL, NEBULIZER (ML) INHALATION 4 TIMES DAILY
Status: DISCONTINUED | OUTPATIENT
Start: 2019-12-03 | End: 2019-12-04 | Stop reason: HOSPADM

## 2019-12-03 RX ORDER — GUAIFENESIN 600 MG/1
1200 TABLET, EXTENDED RELEASE ORAL EVERY 12 HOURS SCHEDULED
Status: DISCONTINUED | OUTPATIENT
Start: 2019-12-03 | End: 2019-12-04 | Stop reason: HOSPADM

## 2019-12-03 RX ORDER — NICOTINE POLACRILEX 4 MG
15 LOZENGE BUCCAL
Status: DISCONTINUED | OUTPATIENT
Start: 2019-12-03 | End: 2019-12-04 | Stop reason: HOSPADM

## 2019-12-03 RX ORDER — DEXTROSE MONOHYDRATE 25 G/50ML
25 INJECTION, SOLUTION INTRAVENOUS
Status: DISCONTINUED | OUTPATIENT
Start: 2019-12-03 | End: 2019-12-04 | Stop reason: HOSPADM

## 2019-12-03 RX ADMIN — METHYLPREDNISOLONE SODIUM SUCCINATE 40 MG: 40 INJECTION, POWDER, FOR SOLUTION INTRAMUSCULAR; INTRAVENOUS at 00:42

## 2019-12-03 RX ADMIN — SODIUM CHLORIDE, PRESERVATIVE FREE 10 ML: 5 INJECTION INTRAVENOUS at 21:56

## 2019-12-03 RX ADMIN — SODIUM CHLORIDE 4 ML: 7 NEBU SOLN,3 % NEBU at 07:22

## 2019-12-03 RX ADMIN — IPRATROPIUM BROMIDE AND ALBUTEROL SULFATE 3 ML: 2.5; .5 SOLUTION RESPIRATORY (INHALATION) at 19:53

## 2019-12-03 RX ADMIN — SODIUM CHLORIDE 4 ML: 7 NEBU SOLN,3 % NEBU at 19:53

## 2019-12-03 RX ADMIN — IPRATROPIUM BROMIDE AND ALBUTEROL SULFATE 3 ML: 2.5; .5 SOLUTION RESPIRATORY (INHALATION) at 10:58

## 2019-12-03 RX ADMIN — GUAIFENESIN 1200 MG: 600 TABLET, EXTENDED RELEASE ORAL at 15:39

## 2019-12-03 RX ADMIN — IPRATROPIUM BROMIDE AND ALBUTEROL SULFATE 3 ML: 2.5; .5 SOLUTION RESPIRATORY (INHALATION) at 15:17

## 2019-12-03 RX ADMIN — SODIUM CHLORIDE, PRESERVATIVE FREE 10 ML: 5 INJECTION INTRAVENOUS at 08:05

## 2019-12-03 RX ADMIN — METHYLPREDNISOLONE SODIUM SUCCINATE 40 MG: 40 INJECTION, POWDER, FOR SOLUTION INTRAMUSCULAR; INTRAVENOUS at 12:56

## 2019-12-03 RX ADMIN — IPRATROPIUM BROMIDE AND ALBUTEROL SULFATE 3 ML: 2.5; .5 SOLUTION RESPIRATORY (INHALATION) at 07:22

## 2019-12-03 RX ADMIN — VERAPAMIL HYDROCHLORIDE 240 MG: 120 TABLET, FILM COATED, EXTENDED RELEASE ORAL at 21:55

## 2019-12-03 RX ADMIN — TORSEMIDE 20 MG: 20 TABLET ORAL at 06:37

## 2019-12-03 RX ADMIN — CEPHALEXIN 250 MG: 250 CAPSULE ORAL at 08:04

## 2019-12-03 RX ADMIN — SODIUM CHLORIDE, PRESERVATIVE FREE 10 ML: 5 INJECTION INTRAVENOUS at 12:57

## 2019-12-03 RX ADMIN — CEPHALEXIN 250 MG: 250 CAPSULE ORAL at 21:55

## 2019-12-03 NOTE — PLAN OF CARE
Problem: Patient Care Overview  Goal: Plan of Care Review   12/03/19 1529   Coping/Psychosocial   Plan of Care Reviewed With patient   Plan of Care Review   Progress improving   OTHER   Outcome Summary Pt walking around unit; in room with no problems. patients cough has increased will contiune care as directed

## 2019-12-03 NOTE — PLAN OF CARE
Problem: Patient Care Overview  Goal: Plan of Care Review  Outcome: Ongoing (interventions implemented as appropriate)   12/03/19 0744   Coping/Psychosocial   Plan of Care Reviewed With patient   OTHER   Outcome Summary Pt had uneventful night. Maintaining O2 sat on 2LPM via NC. Awaiting further plan of care/disposition.      Goal: Individualization and Mutuality  Outcome: Ongoing (interventions implemented as appropriate)

## 2019-12-03 NOTE — PROGRESS NOTES
Discharge Planning Assessment  [facility]     Patient Name: Agnieszka Rizzo  MRN: [id number]  Today's Date: 12/3/2019    Admit Date: 12/1/2019    Discharge Needs Assessment     Row Name 12/03/19 1212       Living Environment    Lives With  alone    Current Living Arrangements  home/apartment/condo    Primary Care Provided by  self    Provides Primary Care For  no one    Family Caregiver if Needed  child(graciela), adult    Quality of Family Relationships  helpful;involved;supportive    Able to Return to Prior Arrangements  yes       Resource/Environmental Concerns    Resource/Environmental Concerns  none       Transition Planning    Patient/Family Anticipates Transition to  home    Patient/Family Anticipated Services at Transition  none    Transportation Anticipated  car, drives self;family or friend will provide       Discharge Needs Assessment    Readmission Within the Last 30 Days  no previous admission in last 30 days    Concerns to be Addressed  no discharge needs identified;denies needs/concerns at this time    Equipment Currently Used at Home  nebulizer;oxygen;other (see comments) Chest vest    Anticipated Changes Related to Illness  none    Equipment Needed After Discharge  none    Provided post acute provider list?  Refused    Current Discharge Risk  lives alone        Discharge Plan     Row Name 12/03/19 1200       Plan    Plan  Home denies any dc needs    Patient/Family in Agreement with Plan  yes    Plan Comments  Spoke with pt at bedside, introduced self and explained CCP role. Verified facesheet and confirmed local pharmacy is [facility], pt denies problems with medication costs. CCP discussed meds to beds, pt declined and wishes to keep pharmacy [facility]. Pt lives alone in a s/s home with 2 steps to enter. Prior to admission pt was IADL with mobility. Pt does have Nocturnal o2, nebulizer and Chest Percussion vest thru [place]. Pt has used HH in the past but provider unknown. Pt went to  [facility] in the past and would not want to return. Pt states her plan is home and she denies any dc needs or concerns. CCP reviewed observation status, and pt verbalized understanding. CCP contact info given. lang BERNAL/CCP        Destination      No service coordination in this encounter.      Durable Medical Equipment      No service coordination in this encounter.      Dialysis/Infusion      No service coordination in this encounter.      Home Medical Care      No service coordination in this encounter.      Therapy      No service coordination in this encounter.      Community Resources      No service coordination in this encounter.          Demographic Summary     Row Name 12/03/19 1210       General Information    Admission Type  observation    Referral Source  admission list    Reason for Consult  discharge planning    Preferred Language  English     Used During This Interaction  no       Contact Information    Permission Granted to Share Info With  family/designee        Functional Status    No documentation.       Psychosocial    No documentation.       Abuse/Neglect    No documentation.       Legal    No documentation.       Substance Abuse    No documentation.       Patient Forms    No documentation.           Cori Paerson RN

## 2019-12-03 NOTE — PROGRESS NOTES
Pharmacy Consult: Warfarin Dosing/ Monitoring    Agnieszka Rizzo is a 89 y.o. female, estimated creatinine clearance is 42.7 mL/min (by C-G formula based on SCr of 0.69 mg/dL). weighing 56.8 kg (125 lb 4.8 oz).     has a past medical history of Aspergillus (CMS/[facility]), Asthma, Atrial fibrillation (CMS/[facility]), Atrial flutter (CMS/[facility]), Bronchiectasis (CMS/[facility]), C. 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    Social History     Tobacco Use   • Smoking status: Never Smoker   • Smokeless tobacco: Never Used   • Tobacco comment: caffiene daily   Substance Use Topics   • Alcohol use: Yes     Comment: occasional   • Drug use: No       Results from last 7 days   Lab Units 12/03/19  0426 12/02/19  1735 12/02/19  0012   INR  3.81* 3.21* 3.20*   HEMOGLOBIN g/dL  --   --  10.4*   HEMATOCRIT %  --   --  31.6*   PLATELETS 10*3/mm3  --   --  276     Results from last 7 days   Lab Units 12/02/19  1542 12/02/19  0012   SODIUM mmol/L  --  144   POTASSIUM mmol/L 4.1 3.3*   CHLORIDE mmol/L  --  102   CO2 mmol/L  --  26.7   BUN mg/dL  --  17   CREATININE mg/dL  --  0.69   CALCIUM mg/dL  --  9.2   BILIRUBIN mg/dL  --  0.4   ALK PHOS U/L  --  89   ALT (SGPT) U/L  --  40*   AST (SGOT) U/L  --  42*   GLUCOSE mg/dL  --  131*     Anticoagulation history: Per [facility] note dates dated 11/18, [facility]  Anticoagulation:  Consulting provider: Dr. Aubrey Pimentel  Start date: 12/2/19  Indication: Atrial fibrillation  Target INR: 2.5-3.5  Expected duration: Lifelong  Bridge Therapy: No                Date 12/2 12/3           INR 3.2 3.81           Warfarin dose               Potential drug interactions: cephalexin, torsemide    Relevant nutrition status: reg diet    Other:     Education complete?/ Date: no    Assessment/Plan:  Level is supratherapeutic. Will not give a dose today  Monitor INR daily  Follow up daily    Pharmacy will continue to follow until discharge or discontinuation of warfarin.   Bart Valladares PharmD

## 2019-12-03 NOTE — PROGRESS NOTES
"                                              LOS: 1 day   Patient Care Team:  Matt Rose MD as PCP - General (Family Medicine)  Hayden Landry MD as PCP - Claims Attributed  Marcie Robbins MD as Consulting Physician (Cardiology)  Nilesh Danielle MD as Consulting Physician (Urology)  Lina Morris, [facility] as Pharmacist  Lev Mckeon [facility] as Pharmacist (Pharmacy)    Chief Complaint:  F/up respiratory failure, asthma and bronchiectasis and medical problems list below    Subjective   Interval History  I reviewed the admission note, progress notes, PMH, PSH, Family hx, social history, imagings and prior records on this admission, summarized the finding in my note and formulated a transition of care plan.    Had a lot of concerns about the diuretics.  She was told in the ER that she has congestive heart failure and fluid in her lungs and she received a diuretic and she was wondering if she had to receive more.  She continues to have cough, mostly nonproductive but sometimes producing creamy thick phlegm.  Cough is worse than usual.  She has associated shortness of breath which is worse than baseline.    REVIEW OF SYSTEMS:   CARDIOVASCULAR: No chest pain, chest pressure or chest discomfort. No palpitations or edema.   RESPIRATORY: See above  GASTROINTESTINAL: No anorexia, nausea, vomiting or diarrhea. No abdominal pain or blood.   HEMATOLOGIC: No bleeding or bruising.     Ventilator/Non-Invasive Ventilation Settings (From admission, onward)    None                Physical Exam:     Vital Signs  Temp:  [97.4 °F (36.3 °C)-98.1 °F (36.7 °C)] 97.4 °F (36.3 °C)  Heart Rate:  [62-80] 70  Resp:  [16-20] 18  BP: (137-148)/(74) 137/74    Intake/Output Summary (Last 24 hours) at 12/3/2019 1332  Last data filed at 12/3/2019 0825  Gross per 24 hour   Intake 240 ml   Output 1950 ml   Net -1710 ml     Flowsheet Rows      First Filed Value   Admission Height  160 cm (63\") Documented at 12/01/2019 2335   Admission " Weight  58.5 kg (129 lb) Documented at 12/02/2019 0112          General Appearance:    Alert, cooperative, in no acute distress   ENMT:  Mallampati score 3, moist mucous membrane   Eyes: Pupils equals and reactive to light. EOMI   Neck:   Large. Trachea midline. No thyromegaly.   Lungs:    Diffuse bilateral crackles with coarse breath sounds.  No wheezing.  Nonlabored breathing    Heart:    Regular rhythm and normal rate, normal S1 and S2, no            murmur   Skin:    No rash   Abdomen:     Soft. No tenderness. No HSM.   Neuro:   Conscious, alert, oriented x3. Strength 5/5 in upper and lower ext   Extremities:   Moves all extremities well, no edema, no cyanosis, no             Redness          Results Review:        Results from last 7 days   Lab Units 12/02/19  1542 12/02/19  0012   SODIUM mmol/L  --  144   POTASSIUM mmol/L 4.1 3.3*   CHLORIDE mmol/L  --  102   CO2 mmol/L  --  26.7   BUN mg/dL  --  17   CREATININE mg/dL  --  0.69   GLUCOSE mg/dL  --  131*   CALCIUM mg/dL  --  9.2     Results from last 7 days   Lab Units 12/02/19  0012   TROPONIN T ng/mL <0.010     Results from last 7 days   Lab Units 12/02/19  0012   WBC 10*3/mm3 8.87   HEMOGLOBIN g/dL 10.4*   HEMATOCRIT % 31.6*   PLATELETS 10*3/mm3 276     Results from last 7 days   Lab Units 12/03/19  0426 12/02/19  1735 12/02/19  0012   INR  3.81* 3.21* 3.20*     Results from last 7 days   Lab Units 12/02/19  0012   PROBNP pg/mL 771.8       I reviewed the patient's new clinical results.  I personally viewed and interpreted the patient's CXR        Medication Review:     albuterol 2.5 mg Nebulization Once   cephalexin 250 mg Oral Q12H   ipratropium-albuterol 3 mL Nebulization 4x Daily - RT   methylPREDNISolone sodium succinate 40 mg Intravenous Q12H   sodium chloride 10 mL Intravenous Q12H   sodium chloride 4 mL Nebulization BID - RT   torsemide 20 mg Oral QAM AC   verapamil  mg Oral Nightly         Pharmacy to dose warfarin        Diagnostic imaging:  I  personally and independently reviewed the following images:  CXR 12/2/2019 compared to prior from 07/25/2019:  Bilateral interstitial infiltrates consistent with known history of bronchiectasis.  Subtle changes noted but overall appears to be the same.    Assessment   1. Bronchiectasis with acute exacerbation  2. Moderate persistent asthma with exacerbation  3. Acute on chronic hypoxic respiratory failure, secondary to the above, on oxygen 2 L/min at night at baseline  4. Gram-negative UTI  5. Permanent A. fib  6. Steroid-induced hyperglycemia  7. Hypokalemia, replaced  8. Anemia, chronic  9. History of MAC and aspergillus infection  10. History of C. difficile     All problems new to me      Plan   · Increase hypertonic saline to 4 times a day.  · Change albuterol to DuoNeb 4 times a day  · Stop Solu-Medrol and start prednisone orally at 40 mg daily  · Start flutter device  · Continue Keflex for UTI, awaiting urine culture.  Previously had E. coli sensitive to cephalosporin.  · Warfarin for A. fib.  Pharmacy to dose.  · Start insulin PRN for hyperglycemia    Disposition: Hopefully home tomorrow.      Sung King MD  12/03/19  1:32 PM            This note was dictated utilizing [employer] dictation

## 2019-12-04 VITALS
DIASTOLIC BLOOD PRESSURE: 69 MMHG | HEIGHT: 63 IN | SYSTOLIC BLOOD PRESSURE: 143 MMHG | WEIGHT: 129 LBS | TEMPERATURE: 97.2 F | BODY MASS INDEX: 22.86 KG/M2 | RESPIRATION RATE: 18 BRPM | HEART RATE: 66 BPM | OXYGEN SATURATION: 99 %

## 2019-12-04 LAB
BACTERIA SPEC AEROBE CULT: ABNORMAL
GLUCOSE BLDC GLUCOMTR-MCNC: 161 MG/DL (ref 70–130)
GLUCOSE BLDC GLUCOMTR-MCNC: 165 MG/DL (ref 70–130)
INR PPP: 4.36 (ref 0.9–1.1)
PROTHROMBIN TIME: 41.5 SECONDS (ref 11.7–14.2)

## 2019-12-04 PROCEDURE — 25010000002 METHYLPREDNISOLONE PER 40 MG: Performed by: INTERNAL MEDICINE

## 2019-12-04 PROCEDURE — G0378 HOSPITAL OBSERVATION PER HR: HCPCS

## 2019-12-04 PROCEDURE — 94799 UNLISTED PULMONARY SVC/PX: CPT

## 2019-12-04 PROCEDURE — 82962 GLUCOSE BLOOD TEST: CPT

## 2019-12-04 PROCEDURE — 96376 TX/PRO/DX INJ SAME DRUG ADON: CPT

## 2019-12-04 PROCEDURE — 85610 PROTHROMBIN TIME: CPT | Performed by: INTERNAL MEDICINE

## 2019-12-04 RX ORDER — GUAIFENESIN 600 MG/1
1200 TABLET, EXTENDED RELEASE ORAL EVERY 12 HOURS SCHEDULED
Qty: 14 TABLET | Refills: 0 | Status: SHIPPED | OUTPATIENT
Start: 2019-12-04 | End: 2021-08-24

## 2019-12-04 RX ORDER — CEPHALEXIN 250 MG/1
250 CAPSULE ORAL EVERY 12 HOURS SCHEDULED
Qty: 2 CAPSULE | Refills: 0 | Status: SHIPPED | OUTPATIENT
Start: 2019-12-04 | End: 2019-12-05

## 2019-12-04 RX ORDER — PREDNISONE 20 MG/1
40 TABLET ORAL DAILY
Qty: 10 TABLET | Refills: 0 | Status: SHIPPED | OUTPATIENT
Start: 2019-12-05 | End: 2019-12-10

## 2019-12-04 RX ADMIN — IPRATROPIUM BROMIDE AND ALBUTEROL SULFATE 3 ML: 2.5; .5 SOLUTION RESPIRATORY (INHALATION) at 11:31

## 2019-12-04 RX ADMIN — TORSEMIDE 20 MG: 20 TABLET ORAL at 06:48

## 2019-12-04 RX ADMIN — GUAIFENESIN 1200 MG: 600 TABLET, EXTENDED RELEASE ORAL at 09:06

## 2019-12-04 RX ADMIN — SODIUM CHLORIDE 4 ML: 7 NEBU SOLN,3 % NEBU at 08:34

## 2019-12-04 RX ADMIN — SODIUM CHLORIDE 4 ML: 7 NEBU SOLN,3 % NEBU at 11:31

## 2019-12-04 RX ADMIN — IPRATROPIUM BROMIDE AND ALBUTEROL SULFATE 3 ML: 2.5; .5 SOLUTION RESPIRATORY (INHALATION) at 08:34

## 2019-12-04 RX ADMIN — METHYLPREDNISOLONE SODIUM SUCCINATE 40 MG: 40 INJECTION, POWDER, FOR SOLUTION INTRAMUSCULAR; INTRAVENOUS at 00:33

## 2019-12-04 RX ADMIN — METHYLPREDNISOLONE SODIUM SUCCINATE 40 MG: 40 INJECTION, POWDER, FOR SOLUTION INTRAMUSCULAR; INTRAVENOUS at 12:07

## 2019-12-04 RX ADMIN — CEPHALEXIN 250 MG: 250 CAPSULE ORAL at 09:06

## 2019-12-04 RX ADMIN — SODIUM CHLORIDE, PRESERVATIVE FREE 10 ML: 5 INJECTION INTRAVENOUS at 09:17

## 2019-12-04 NOTE — DISCHARGE SUMMARY
DISCHARGE SUMMARY    Patient Name: Agnieszka Rizzo  Age/Sex: 89 y.o. female  : 1930  MRN: [id number]  Patient Care Team:  Matt Rose MD as PCP - General (Family Medicine)  Hayden Landry MD as PCP - Claims Attributed  Marcie Robbins MD as Consulting Physician (Cardiology)  Nilesh Danielle MD as Consulting Physician (Urology)  Lina Morris [facility] as Pharmacist  Lev Mckeon [facility] as Pharmacist (Pharmacy)       Date of Admit: 2019  Date of Discharge:  19  Discharge Condition: Good    Discharge Diagnoses:  1. Bronchiectasis with acute exacerbation  2. Moderate persistent asthma with exacerbation  3. Acute on chronic hypoxic respiratory failure, secondary to the above, on oxygen 2 L/min at night at baseline  4. Proteus UTI  5. Permanent A. fib  6. Steroid-induced hyperglycemia  7. Hypokalemia, replaced  8. Anemia, chronic  9. History of MAC and aspergillus infection  10. History of C. difficile     History of present illness from H&P from 2019: per Dr. Aubrey Pimentel  Mrs. Rizzo is a 88yo WF with a history of moderate persistent asthma follow by Dr. Tucker in our office. She is usually on advair and does vest and hypertonic nebs at home.  She  Presented to the ER last night with sudden onset of shortness of breath occurred with a couple episodes of more acute worsening as well.  She got relief from  Her inhalers initially but then got less response so she came to the ER.  She does have associated productive cough and chest tightness.  She did start an oral steroid as an outpatient but has failed to improve. Symptoms are moderate and worse on exertion. She does not report increase in sputum volume or change in sputum color.   She reports she coughs more when supine.     She does  Have dysuria    Hospital Course:   Patient apparently had some wheezing on admission which improved with the use of nebulizer therapy and intravenous steroid.  She had cough with creamy phlegm  consistent with bronchiectasis exacerbation.  Respiratory viral panel was negative as well as sputum culture.  She did have symptoms of UTI and she grew Proteus mirabilis and was treated with Keflex for that.  Sensitivity was performed and Keflex was effective.    She clinically improved throughout her stay.    On discharge, she was instructed to take prednisone 40 mg for 5 days (total of 7 days treatment), increase the hypertonic saline nebulizer to 4 times a day and takes Keflex for an additional 3 days for UTI.  She already has a vest which she was encouraged to use 4 times a day.  Concerning her elevated INR on discharge, she was instructed to skip Coumadin on the day of discharge and take 3 mg the following days until Monday when she has a follow-up with Coumadin clinic and she will have an INR level.    Consults:   IP CONSULT TO PULMONOLOGY    Significant Discharge Diagnostics   Procedures Performed:         Pertinent Lab Results:  Results from last 7 days   Lab Units 12/02/19  1542 12/02/19  0012   SODIUM mmol/L  --  144   POTASSIUM mmol/L 4.1 3.3*   CHLORIDE mmol/L  --  102   CO2 mmol/L  --  26.7   BUN mg/dL  --  17   CREATININE mg/dL  --  0.69   GLUCOSE mg/dL  --  131*   CALCIUM mg/dL  --  9.2   AST (SGOT) U/L  --  42*   ALT (SGPT) U/L  --  40*     Results from last 7 days   Lab Units 12/02/19  0012   TROPONIN T ng/mL <0.010     Results from last 7 days   Lab Units 12/02/19  0012   WBC 10*3/mm3 8.87   HEMOGLOBIN g/dL 10.4*   HEMATOCRIT % 31.6*   PLATELETS 10*3/mm3 276   MCV fL 96.9   MCH pg 31.9   MCHC g/dL 32.9   RDW % 14.6   RDW-SD fl 51.5   MPV fL 10.1   NEUTROPHIL % % 69.1   LYMPHOCYTE % % 18.6*   MONOCYTES % % 11.0   EOSINOPHIL % % 0.1*   BASOPHIL % % 0.5   IMM GRAN % % 0.7*   NEUTROS ABS 10*3/mm3 6.13   LYMPHS ABS 10*3/mm3 1.65   MONOS ABS 10*3/mm3 0.98*   EOS ABS 10*3/mm3 0.01   BASOS ABS 10*3/mm3 0.04   IMMATURE GRANS (ABS) 10*3/mm3 0.06*   NRBC /100 WBC 0.1     Results from last 7 days   Lab Units  12/04/19  0453 12/03/19  0426 12/02/19  1735 12/02/19  0012   INR  4.36* 3.81* 3.21* 3.20*             Results from last 7 days   Lab Units 12/02/19  0012   PROBNP pg/mL 771.8                         Results from last 7 days   Lab Units 12/02/19  0146   URINECX  >100,000 CFU/mL Proteus mirabilis*     Results from last 7 days   Lab Units 12/02/19  0146   NITRITE UA  Negative   WBC UA /HPF 21-30*   BACTERIA UA /HPF 4+*   SQUAM EPITHEL UA /HPF 0-2   URINECX  >100,000 CFU/mL Proteus mirabilis*     Results from last 7 days   Lab Units 12/02/19  0214   ADENOVIRUS DETECTION BY PCR  Not Detected   CORONAVIRUS 229E  Not Detected   CORONAVIRUS HKU1  Not Detected   CORONAVIRUS NL63  Not Detected   CORONAVIRUS OC43  Not Detected   HUMAN METAPNEUMOVIRUS  Not Detected   HUMAN RHINOVIRUS/ENTEROVIRUS  Not Detected   INFLUENZA B PCR  Not Detected   PARAINFLUENZA 1  Not Detected   PARAINFLUENZA VIRUS 2  Not Detected   PARAINFLUENZA VIRUS 3  Not Detected   PARAINFLUENZA VIRUS 4  Not Detected   BORDETELLA PERTUSSIS PCR  Not Detected   CHLAMYDOPHILA PNEUMONIAE PCR  Not Detected   MYCOPLAMA PNEUMO PCR  Not Detected   INFLUENZA A PCR  Not Detected   INFLUENZA A H3  Not Detected   INFLUENZA A H1  Not Detected   RSV, PCR  Not Detected           Imaging Results:  Imaging Results (All)     Procedure Component Value Units Date/Time    XR Chest 2 View [[id number]] Collected:  12/02/19 0035     Updated:  12/02/19 0529    Narrative:       PA AND LATERAL CHEST X-RAY     HISTORY: SOA     COMPARISON: 07/25/2019.     FINDINGS: PA and lateral views of the chest were obtained. The lungs are  well inflated. Mild pulmonary vascular congestion and peripheral  interstitial prominence suggest mild edema. Heart is enlarged. No  significant pleural fluid. Extensive vascular calculi. Generalized  osteopenia.             Impression:       Mild edema/CHF     This report was finalized on 12/2/2019 5:26 AM by Karel Meyer M.D.             Objective:   Temp:   [97.4 °F (36.3 °C)-97.9 °F (36.6 °C)] 97.4 °F (36.3 °C)  Heart Rate:  [55-86] 73  Resp:  [16-18] 18  BP: (140-156)/(68-83) 150/83   SpO2:  [88 %-95 %] 92 %  on  Flow (L/min):  [1-2] 1.5 Device (Oxygen Therapy): room air    Intake/Output Summary (Last 24 hours) at 12/4/2019 1037  Last data filed at 12/4/2019 0530  Gross per 24 hour   Intake 240 ml   Output 1200 ml   Net -960 ml     Body mass index is 22.85 kg/m².      12/02/19  0842 12/03/19  0642 12/04/19  0530   Weight: 57.2 kg (126 lb 3.2 oz) 56.8 kg (125 lb 4.8 oz) 58.5 kg (129 lb)     Weight change: 1.27 kg (2 lb 12.8 oz)    Physical Exam:  General Appearance:    Alert, cooperative, in no acute distress   ENMT:  Mallampati score 3, moist mucous membrane   Eyes: Pupils equals and reactive to light. EOMI   Neck:   Large. Trachea midline. No thyromegaly.   Lungs:    Diffuse bilateral crackles with coarse breath sounds.  No wheezing.  Nonlabored breathing    Heart:    Regular rhythm and normal rate, normal S1 and S2, no            murmur   Skin:    No rash   Abdomen:     Soft. No tenderness. No HSM.   Neuro:   Conscious, alert, oriented x3. Strength 5/5 in upper and lower ext   Extremities:   Moves all extremities well, no edema, no cyanosis, no             Redness           Discharge Medications and Instructions:     Discharge Medications     Discharge Medications      New Medications      Instructions Start Date   cephalexin 250 MG capsule  Commonly known as:  KEFLEX   250 mg, Oral, Every 12 Hours Scheduled      guaiFENesin 600 MG 12 hr tablet  Commonly known as:  MUCINEX   1,200 mg, Oral, Every 12 Hours Scheduled         Changes to Medications      Instructions Start Date   predniSONE 20 MG tablet  Commonly known as:  DELTASONE  What changed:  how much to take   40 mg, Oral, Daily   Start Date:  12/5/2019        Continue These Medications      Instructions Start Date   acetaminophen 500 MG tablet  Commonly known as:  TYLENOL   2 daily prn      atorvastatin 40 MG  tablet  Commonly known as:  LIPITOR   TAKE 1 TABLET EVERY DAY      calcium carbonate 600 MG tablet  Commonly known as:  OS-EVIN   600 mg, Oral, Daily, With vitamin D      DELSYM PO   1 dose, Oral, 2 Times Daily PRN      ezetimibe 10 MG tablet  Commonly known as:  ZETIA   TAKE 1 TABLET EVERY DAY      ferrous gluconate 324 MG tablet  Commonly known as:  FERGON   324 mg, Oral, Daily With Breakfast      FLORAJEN ACIDOPHILUS capsule   1 tablet, Oral, Daily      fluticasone 50 MCG/ACT nasal spray  Commonly known as:  FLONASE   2 sprays, Nasal, Daily      fluticasone-salmeterol 230-21 MCG/ACT inhaler  Commonly known as:  ADVAIR HFA   2 puffs, Inhalation, 2 Times Daily - RT      glucosamine-chondroitin 500-400 MG capsule capsule   1 capsule, Oral, Daily PRN      ipratropium-albuterol 0.5-2.5 mg/3 ml nebulizer  Commonly known as:  DUO-NEB   3 mL, Nebulization, 2 Times Daily      levocetirizine 5 MG tablet  Commonly known as:  XYZAL   5 mg, Oral, Every Evening      LITTLE NOSES SALINE NASAL MIST aerosol solution   1 spray, Nasal, 2 Times Daily PRN      MUCINEX ALLERGY PO   1 tablet, Oral, 2 Times Daily PRN      multivitamin tablet tablet   1 tablet, Oral, Daily, (contains 25 MCG of vitamin K)       O2  Commonly known as:  OXYGEN   2 L/min, Inhalation, Nightly      potassium chloride 10 MEQ CR tablet  Commonly known as:  K-DUR,KLOR-CON   TAKE 1 TABLET EVERY DAY      sodium chloride 7 % nebulizer solution nebulizer solution   4 mL, Nebulization, 2 Times Daily      torsemide 20 MG tablet  Commonly known as:  DEMADEX   TAKE 1 TABLET TWICE DAILY      valACYclovir 1000 MG tablet  Commonly known as:  VALTREX   1,000 mg, Oral, 2 Times Daily      VENTOLIN  (90 Base) MCG/ACT inhaler  Generic drug:  albuterol sulfate HFA   INL 2 PFS PO Q 4 H PRN      verapamil  MG 24 hr capsule  Commonly known as:  VERELAN   240 mg, Oral, Nightly      VITAMIN B COMPLEX PO   1 tablet, Oral, Daily      warfarin 3 MG tablet  Commonly known as:   COUMADIN   TAKE ONE AND A HALF(4.5MG) TABLETS ON MONDAY, WEDNESDAY, AND FRIDAY AND ONE 3MG TABLET ALL OTHER DAYS OR AS DIRECTED             Discharge Diet:    Dietary Orders (From admission, onward)    Start     Ordered    12/02/19 0226  Diet Regular  Diet Effective Now     Question:  Diet Texture / Consistency  Answer:  Regular    12/02/19 0225          Activity at Discharge:       Discharge disposition: Home    Discharge Instructions and Follow ups:  Follow-up Information     Matt Rose MD Follow up.    Specialty:  Family Medicine  Contact information:  [id number]  [facility] [id number] [phone]             Rogelio Tucker MD. Schedule an appointment as soon as possible for a visit.    Specialties:  Pulmonary Disease, Sleep Medicine  Why:  1-2 weeks  Contact information:  [address]  [facility] [id number] [phone]                 Future Appointments   Date Time Provider Department Center   12/9/2019  9:30 AM [facility] None   5/14/2020  9:30 AM Matt Rose MD [facility] None   8/11/2020 11:20 AM Marcie Robbins MD [facility] None        Medication Reconciliation: Please see electronically completed Med Rec.    Total time spent discharging patient including evaluation, medication reconciliation, arranging follow up, and post hospitalization instructions and education total time exceeds 30 minutes.     Sung King MD  12/04/19  10:37 AM      Dictated utilizing Dragon dictation

## 2019-12-04 NOTE — PROGRESS NOTES
Pharmacy Consult: Warfarin Dosing/ Monitoring     Agnieszka Rizzo is a 89 y.o. female, estimated creatinine clearance is 42.7 mL/min (by C-G formula based on SCr of 0.69 mg/dL). weighing 56.8 kg (125 lb 4.8 oz).     She has a past medical history of Aspergillus (CMS/[facility]), Asthma, Atrial fibrillation (CMS/HCC), Atrial flutter (CMS/HCC), Bronchiectasis (CMS/[facility]), C. 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     Social History            Tobacco Use   • Smoking status: Never Smoker   • Smokeless tobacco: Never Used   • Tobacco comment: caffiene daily   Substance Use Topics   • Alcohol use: Yes       Comment: occasional   • Drug use: No                Results from last 7 days   Lab Units 12/03/19  0426 12/02/19  1735 12/02/19  0012   INR   3.81* 3.21* 3.20*   HEMOGLOBIN g/dL  --   --  10.4*   HEMATOCRIT %  --   --  31.6*   PLATELETS 10*3/mm3  --   --  276            Results from last 7 days   Lab Units 12/02/19  1542 12/02/19  0012   SODIUM mmol/L  --  144   POTASSIUM mmol/L 4.1 3.3*   CHLORIDE mmol/L  --  102   CO2 mmol/L  --  26.7   BUN mg/dL  --  17   CREATININE mg/dL  --  0.69   CALCIUM mg/dL  --  9.2   BILIRUBIN mg/dL  --  0.4   ALK PHOS U/L  --  89   ALT (SGPT) U/L  --  40*   AST (SGOT) U/L  --  42*   GLUCOSE mg/dL  --  131*      Anticoagulation history: Per last [facility] note dated 11/18, patient takes 4.5 mg qMWF and 3 mg qTThSSun      Hospital Anticoagulation:  Consulting provider: Dr. Aubrey Pimentel  Start date: 12/2/19  Indication: Atrial fibrillation  Target INR: 2.5-3.5  Expected duration: Lifelong  Bridge Therapy: No                Date 12/2 12/3  12/4                 INR 3.2 3.81  4.36                 Warfarin dose                          Potential drug interactions: cephalexin, torsemide     Relevant nutrition status: reg diet     Other:      Education complete?/ Date: no     Assessment/Plan:  Level is supratherapeutic and rising despite held warfarin x 2 days. Per discharge summary, pt has been instructed to skip warfarin today and resume warfarin 3mg 12/5 through 12/9 when she has a follow-up scheduled with the anticoagulation clinic.     Pharmacy will continue to follow until discharge or discontinuation of warfarin.   Fidelina Rutledge, JohannaD

## 2019-12-04 NOTE — PROGRESS NOTES
Case Management Discharge Note      Final Note: home no additional dc orders noted. laura rn/ccp    Provided post acute provider list?: Refused    Destination      No service has been selected for the patient.      Durable Medical Equipment      No service has been selected for the patient.      Dialysis/Infusion      No service has been selected for the patient.      Home Medical Care      No service has been selected for the patient.      Therapy      No service has been selected for the patient.      Community Resources      No service has been selected for the patient.             Final Discharge Disposition Code: 01 - home or self-care

## 2019-12-04 NOTE — PLAN OF CARE
Problem: Patient Care Overview  Goal: Plan of Care Review   12/04/19 1512   Coping/Psychosocial   Plan of Care Reviewed With patient   Plan of Care Review   Progress improving   OTHER   Outcome Summary vss. no pain. no fall. d/c home today. continue to monitor

## 2019-12-04 NOTE — PLAN OF CARE
"Problem: Patient Care Overview  Goal: Plan of Care Review  Outcome: Ongoing (interventions implemented as appropriate)   12/04/19 0605   Coping/Psychosocial   Plan of Care Reviewed With patient   OTHER   Outcome Summary Pt was on RA at beginning of shift but had to be placed on 1LPM via NC during sleep. Pt awoke from dream with a gasping breath and \"felt like\" the episode that brought her to the ER. I was able to talke pt through the episode. Sats maintained at 95% on RA. We discussed pursed lip breathing, incentive spirometer, flutter device, peak flow meter, MDI spacers, and asthma triggers. Pt verbalizes she feels much more comfortable about going home and being able to control her symptoms.     Goal: Individualization and Mutuality  Outcome: Ongoing (interventions implemented as appropriate)        "

## 2019-12-09 ENCOUNTER — ANTICOAGULATION VISIT (OUTPATIENT)
Dept: PHARMACY | Facility: HOSPITAL | Age: 84
End: 2019-12-09

## 2019-12-09 DIAGNOSIS — I48.19 PERSISTENT ATRIAL FIBRILLATION (HCC): ICD-10-CM

## 2019-12-09 LAB
INR PPP: 2.1 (ref 0.91–1.09)
PROTHROMBIN TIME: 24.7 SECONDS (ref 10–13.8)

## 2019-12-09 PROCEDURE — 36416 COLLJ CAPILLARY BLOOD SPEC: CPT

## 2019-12-09 PROCEDURE — G0463 HOSPITAL OUTPT CLINIC VISIT: HCPCS

## 2019-12-09 PROCEDURE — 85610 PROTHROMBIN TIME: CPT

## 2019-12-09 NOTE — PROGRESS NOTES
Anticoagulation Clinic Progress Note    Anticoagulation Summary  As of 2019    INR goal:   2.0-3.0   TTR:   56.9 % (1.2 y)   INR used for dosin.1 (2019)   Warfarin maintenance plan:   4.5 mg every Mon, Wed, Fri; 3 mg all other days   Weekly warfarin total:   25.5 mg   No change documented:   Lev Mckeon RPH   Plan last modified:   Lev Mckeon RPH (2019)   Next INR check:   2019   Priority:   Maintenance   Target end date:       Indications    Persistent atrial fibrillation [I48.19]             Anticoagulation Episode Summary     INR check location:       Preferred lab:       Send INR reminders to:    [facility]    Comments:         Anticoagulation Care Providers     Provider Role Specialty Phone number    Marcie Robbins MD Referring Cardiology [phone]          Clinic Interview:  Patient Findings     Positives:   Missed doses, Change in medications, Hospital admission    Negatives:   Signs/symptoms of thrombosis, Signs/symptoms of bleeding,   Laboratory test error suspected, Change in health, Change in alcohol use,   Change in activity, Upcoming invasive procedure, Emergency department   visit, Upcoming dental procedure, Extra doses, Change in diet/appetite,   Bruising, Other complaints    Comments:   Hospitalized last wk r/t brochiectasis/asthma w/   exacerbation. Finished cephalexina couple days ago. Today is last day of   prednisone.      Clinical Outcomes     Negatives:   Major bleeding event, Thromboembolic event,   Anticoagulation-related hospital admission, Anticoagulation-related ED   visit, Anticoagulation-related fatality    Comments:   Hospitalized last wk r/t brochiectasis/asthma w/   exacerbation. Finished cephalexina couple days ago. Today is last day of   prednisone.        INR History:  Anticoagulation Monitoring 2019   INR 1.7 2.2 2.1   INR Date 2019   INR Goal 2.0-3.0 2.0-3.0 2.0-3.0   Trend  Up Same Same   Last Week Total 21 mg 25.5 mg 16 mg   Next Week Total 25.5 mg 25.5 mg 25.5 mg   Sun 3 mg 3 mg 3 mg   Mon 4.5 mg 4.5 mg 4.5 mg   Tue 3 mg 3 mg 3 mg   Wed 4.5 mg 4.5 mg 4.5 mg   Thu 3 mg 3 mg 3 mg   Fri 4.5 mg 4.5 mg 4.5 mg   Sat 3 mg 3 mg 3 mg   Visit Report - - -   Some recent data might be hidden       Plan:  1. INR is Therapeutic today- see above in Anticoagulation Summary.  Will instruct Agnieszka Rizzo to Resume their pre-hospitalization warfarin regimen of 4.5 mg MWF, 3 mg rest of wk. - see above in Anticoagulation Summary.  2. Follow up in 1 week  3. Patient declines warfarin refills.  4. Verbal and written information provided. Patient expresses understanding and has no further questions at this time.    Lev Mckeon [facility]

## 2019-12-16 ENCOUNTER — HOSPITAL ENCOUNTER (INPATIENT)
Facility: HOSPITAL | Age: 84
LOS: 2 days | Discharge: HOME | End: 2019-12-18
Attending: EMERGENCY MEDICINE | Admitting: INTERNAL MEDICINE

## 2019-12-16 ENCOUNTER — APPOINTMENT (OUTPATIENT)
Dept: GENERAL RADIOLOGY | Facility: HOSPITAL | Age: 84
End: 2019-12-16

## 2019-12-16 DIAGNOSIS — J20.4 ACUTE BRONCHITIS DUE TO PARAINFLUENZA VIRUS: ICD-10-CM

## 2019-12-16 DIAGNOSIS — I48.91 ATRIAL FIBRILLATION WITH RVR (HCC): ICD-10-CM

## 2019-12-16 DIAGNOSIS — R94.31 ABNORMAL EKG: Primary | ICD-10-CM

## 2019-12-16 DIAGNOSIS — I50.9 CONGESTIVE HEART FAILURE, UNSPECIFIED HF CHRONICITY, UNSPECIFIED HEART FAILURE TYPE (HCC): ICD-10-CM

## 2019-12-16 DIAGNOSIS — J44.1 COPD EXACERBATION (HCC): ICD-10-CM

## 2019-12-16 PROBLEM — J20.9 BRONCHITIS, ACUTE, WITH BRONCHOSPASM: Status: ACTIVE | Noted: 2019-12-16

## 2019-12-16 LAB
ALBUMIN SERPL-MCNC: 3.8 G/DL (ref 3.5–5.2)
ALBUMIN/GLOB SERPL: 1.1 G/DL
ALP SERPL-CCNC: 88 U/L (ref 39–117)
ALT SERPL W P-5'-P-CCNC: 23 U/L (ref 1–33)
ANION GAP SERPL CALCULATED.3IONS-SCNC: 11.3 MMOL/L (ref 5–15)
AST SERPL-CCNC: 26 U/L (ref 1–32)
B PARAPERT DNA SPEC QL NAA+PROBE: NOT DETECTED
B PERT DNA SPEC QL NAA+PROBE: NOT DETECTED
BASOPHILS # BLD AUTO: 0.02 10*3/MM3 (ref 0–0.2)
BASOPHILS NFR BLD AUTO: 0.3 % (ref 0–1.5)
BILIRUB SERPL-MCNC: 0.8 MG/DL (ref 0.2–1.2)
BUN BLD-MCNC: 14 MG/DL (ref 8–23)
BUN/CREAT SERPL: 17.1 (ref 7–25)
C PNEUM DNA NPH QL NAA+NON-PROBE: NOT DETECTED
CALCIUM SPEC-SCNC: 9 MG/DL (ref 8.6–10.5)
CHLORIDE SERPL-SCNC: 99 MMOL/L (ref 98–107)
CO2 SERPL-SCNC: 31.7 MMOL/L (ref 22–29)
CREAT BLD-MCNC: 0.82 MG/DL (ref 0.57–1)
DEPRECATED RDW RBC AUTO: 53.4 FL (ref 37–54)
EOSINOPHIL # BLD AUTO: 0.1 10*3/MM3 (ref 0–0.4)
EOSINOPHIL NFR BLD AUTO: 1.3 % (ref 0.3–6.2)
ERYTHROCYTE [DISTWIDTH] IN BLOOD BY AUTOMATED COUNT: 15 % (ref 12.3–15.4)
FLUAV H1 2009 PAND RNA NPH QL NAA+PROBE: NOT DETECTED
FLUAV H1 HA GENE NPH QL NAA+PROBE: NOT DETECTED
FLUAV H3 RNA NPH QL NAA+PROBE: NOT DETECTED
FLUAV SUBTYP SPEC NAA+PROBE: NOT DETECTED
FLUBV RNA ISLT QL NAA+PROBE: NOT DETECTED
GFR SERPL CREATININE-BSD FRML MDRD: 66 ML/MIN/1.73
GLOBULIN UR ELPH-MCNC: 3.4 GM/DL
GLUCOSE BLD-MCNC: 96 MG/DL (ref 65–99)
HADV DNA SPEC NAA+PROBE: NOT DETECTED
HCOV 229E RNA SPEC QL NAA+PROBE: NOT DETECTED
HCOV HKU1 RNA SPEC QL NAA+PROBE: NOT DETECTED
HCOV NL63 RNA SPEC QL NAA+PROBE: NOT DETECTED
HCOV OC43 RNA SPEC QL NAA+PROBE: NOT DETECTED
HCT VFR BLD AUTO: 33.1 % (ref 34–46.6)
HGB BLD-MCNC: 11 G/DL (ref 12–15.9)
HMPV RNA NPH QL NAA+NON-PROBE: NOT DETECTED
HOLD SPECIMEN: NORMAL
HOLD SPECIMEN: NORMAL
HPIV1 RNA SPEC QL NAA+PROBE: DETECTED
HPIV2 RNA SPEC QL NAA+PROBE: NOT DETECTED
HPIV3 RNA NPH QL NAA+PROBE: NOT DETECTED
HPIV4 P GENE NPH QL NAA+PROBE: NOT DETECTED
IMM GRANULOCYTES # BLD AUTO: 0.05 10*3/MM3 (ref 0–0.05)
IMM GRANULOCYTES NFR BLD AUTO: 0.7 % (ref 0–0.5)
INR PPP: 2.01 (ref 0.9–1.1)
INR PPP: 2.06 (ref 0.9–1.1)
LYMPHOCYTES # BLD AUTO: 2.19 10*3/MM3 (ref 0.7–3.1)
LYMPHOCYTES NFR BLD AUTO: 28.8 % (ref 19.6–45.3)
M PNEUMO IGG SER IA-ACNC: NOT DETECTED
MCH RBC QN AUTO: 32.4 PG (ref 26.6–33)
MCHC RBC AUTO-ENTMCNC: 33.2 G/DL (ref 31.5–35.7)
MCV RBC AUTO: 97.4 FL (ref 79–97)
MONOCYTES # BLD AUTO: 0.61 10*3/MM3 (ref 0.1–0.9)
MONOCYTES NFR BLD AUTO: 8 % (ref 5–12)
NEUTROPHILS # BLD AUTO: 4.64 10*3/MM3 (ref 1.7–7)
NEUTROPHILS NFR BLD AUTO: 60.9 % (ref 42.7–76)
NRBC BLD AUTO-RTO: 0 /100 WBC (ref 0–0.2)
NT-PROBNP SERPL-MCNC: 1814 PG/ML (ref 5–1800)
PLATELET # BLD AUTO: 203 10*3/MM3 (ref 140–450)
PMV BLD AUTO: 11.1 FL (ref 6–12)
POTASSIUM BLD-SCNC: 3.1 MMOL/L (ref 3.5–5.2)
PROT SERPL-MCNC: 7.2 G/DL (ref 6–8.5)
PROTHROMBIN TIME: 22.4 SECONDS (ref 11.7–14.2)
PROTHROMBIN TIME: 22.9 SECONDS (ref 11.7–14.2)
RBC # BLD AUTO: 3.4 10*6/MM3 (ref 3.77–5.28)
RHINOVIRUS RNA SPEC NAA+PROBE: NOT DETECTED
RSV RNA NPH QL NAA+NON-PROBE: NOT DETECTED
SODIUM BLD-SCNC: 142 MMOL/L (ref 136–145)
TROPONIN T SERPL-MCNC: <0.01 NG/ML (ref 0–0.03)
WBC NRBC COR # BLD: 7.61 10*3/MM3 (ref 3.4–10.8)
WHOLE BLOOD HOLD SPECIMEN: NORMAL
WHOLE BLOOD HOLD SPECIMEN: NORMAL

## 2019-12-16 PROCEDURE — 71046 X-RAY EXAM CHEST 2 VIEWS: CPT

## 2019-12-16 PROCEDURE — 93005 ELECTROCARDIOGRAM TRACING: CPT | Performed by: EMERGENCY MEDICINE

## 2019-12-16 PROCEDURE — 94799 UNLISTED PULMONARY SVC/PX: CPT

## 2019-12-16 PROCEDURE — 84484 ASSAY OF TROPONIN QUANT: CPT | Performed by: INTERNAL MEDICINE

## 2019-12-16 PROCEDURE — 85610 PROTHROMBIN TIME: CPT | Performed by: INTERNAL MEDICINE

## 2019-12-16 PROCEDURE — 0100U HC BIOFIRE FILMARRAY RESP PANEL 2: CPT | Performed by: EMERGENCY MEDICINE

## 2019-12-16 PROCEDURE — 93005 ELECTROCARDIOGRAM TRACING: CPT

## 2019-12-16 PROCEDURE — 85025 COMPLETE CBC W/AUTO DIFF WBC: CPT

## 2019-12-16 PROCEDURE — 93010 ELECTROCARDIOGRAM REPORT: CPT | Performed by: INTERNAL MEDICINE

## 2019-12-16 PROCEDURE — 83880 ASSAY OF NATRIURETIC PEPTIDE: CPT

## 2019-12-16 PROCEDURE — 80053 COMPREHEN METABOLIC PANEL: CPT

## 2019-12-16 PROCEDURE — 84484 ASSAY OF TROPONIN QUANT: CPT | Performed by: EMERGENCY MEDICINE

## 2019-12-16 PROCEDURE — 84484 ASSAY OF TROPONIN QUANT: CPT

## 2019-12-16 PROCEDURE — 36415 COLL VENOUS BLD VENIPUNCTURE: CPT

## 2019-12-16 PROCEDURE — 99285 EMERGENCY DEPT VISIT HI MDM: CPT

## 2019-12-16 PROCEDURE — 25010000002 METHYLPREDNISOLONE PER 125 MG: Performed by: INTERNAL MEDICINE

## 2019-12-16 PROCEDURE — 25010000002 METHYLPREDNISOLONE PER 125 MG: Performed by: EMERGENCY MEDICINE

## 2019-12-16 PROCEDURE — 93005 ELECTROCARDIOGRAM TRACING: CPT | Performed by: INTERNAL MEDICINE

## 2019-12-16 PROCEDURE — 85610 PROTHROMBIN TIME: CPT | Performed by: EMERGENCY MEDICINE

## 2019-12-16 PROCEDURE — 94640 AIRWAY INHALATION TREATMENT: CPT

## 2019-12-16 PROCEDURE — 25010000002 FUROSEMIDE PER 20 MG: Performed by: EMERGENCY MEDICINE

## 2019-12-16 RX ORDER — POTASSIUM CHLORIDE 750 MG/1
10 CAPSULE, EXTENDED RELEASE ORAL DAILY
COMMUNITY
End: 2020-03-26 | Stop reason: SDUPTHER

## 2019-12-16 RX ORDER — POTASSIUM CHLORIDE 7.45 MG/ML
10 INJECTION INTRAVENOUS
Status: DISCONTINUED | OUTPATIENT
Start: 2019-12-16 | End: 2019-12-18 | Stop reason: HOSPADM

## 2019-12-16 RX ORDER — FLUTICASONE PROPIONATE 50 MCG
2 SPRAY, SUSPENSION (ML) NASAL DAILY
Status: DISCONTINUED | OUTPATIENT
Start: 2019-12-16 | End: 2019-12-18 | Stop reason: HOSPADM

## 2019-12-16 RX ORDER — BUDESONIDE AND FORMOTEROL FUMARATE DIHYDRATE 160; 4.5 UG/1; UG/1
2 AEROSOL RESPIRATORY (INHALATION)
Status: DISCONTINUED | OUTPATIENT
Start: 2019-12-16 | End: 2019-12-18 | Stop reason: HOSPADM

## 2019-12-16 RX ORDER — MAGNESIUM SULFATE 1 G/100ML
1 INJECTION INTRAVENOUS AS NEEDED
Status: DISCONTINUED | OUTPATIENT
Start: 2019-12-16 | End: 2019-12-18 | Stop reason: HOSPADM

## 2019-12-16 RX ORDER — LOSARTAN POTASSIUM 100 MG/1
100 TABLET ORAL DAILY
Status: DISCONTINUED | OUTPATIENT
Start: 2019-12-16 | End: 2019-12-18 | Stop reason: HOSPADM

## 2019-12-16 RX ORDER — ONDANSETRON 4 MG/1
4 TABLET, FILM COATED ORAL EVERY 6 HOURS PRN
Status: DISCONTINUED | OUTPATIENT
Start: 2019-12-16 | End: 2019-12-18 | Stop reason: HOSPADM

## 2019-12-16 RX ORDER — BISACODYL 10 MG
10 SUPPOSITORY, RECTAL RECTAL DAILY PRN
Status: DISCONTINUED | OUTPATIENT
Start: 2019-12-16 | End: 2019-12-18 | Stop reason: HOSPADM

## 2019-12-16 RX ORDER — SODIUM CHLORIDE 0.9 % (FLUSH) 0.9 %
10 SYRINGE (ML) INJECTION EVERY 12 HOURS SCHEDULED
Status: DISCONTINUED | OUTPATIENT
Start: 2019-12-16 | End: 2019-12-17

## 2019-12-16 RX ORDER — SODIUM CHLORIDE 0.9 % (FLUSH) 0.9 %
10 SYRINGE (ML) INJECTION AS NEEDED
Status: DISCONTINUED | OUTPATIENT
Start: 2019-12-16 | End: 2019-12-18 | Stop reason: HOSPADM

## 2019-12-16 RX ORDER — METHYLPREDNISOLONE SODIUM SUCCINATE 125 MG/2ML
125 INJECTION, POWDER, LYOPHILIZED, FOR SOLUTION INTRAMUSCULAR; INTRAVENOUS ONCE
Status: COMPLETED | OUTPATIENT
Start: 2019-12-16 | End: 2019-12-16

## 2019-12-16 RX ORDER — SODIUM CHLORIDE FOR INHALATION 7 %
4 VIAL, NEBULIZER (ML) INHALATION 2 TIMES DAILY
Status: DISCONTINUED | OUTPATIENT
Start: 2019-12-16 | End: 2019-12-18 | Stop reason: HOSPADM

## 2019-12-16 RX ORDER — VERAPAMIL HYDROCHLORIDE 240 MG/1
240 TABLET, FILM COATED, EXTENDED RELEASE ORAL NIGHTLY
Status: DISCONTINUED | OUTPATIENT
Start: 2019-12-16 | End: 2019-12-16

## 2019-12-16 RX ORDER — IPRATROPIUM BROMIDE AND ALBUTEROL SULFATE 2.5; .5 MG/3ML; MG/3ML
3 SOLUTION RESPIRATORY (INHALATION)
Status: DISCONTINUED | OUTPATIENT
Start: 2019-12-16 | End: 2019-12-18 | Stop reason: HOSPADM

## 2019-12-16 RX ORDER — ACETAMINOPHEN 650 MG/1
650 SUPPOSITORY RECTAL EVERY 4 HOURS PRN
Status: DISCONTINUED | OUTPATIENT
Start: 2019-12-16 | End: 2019-12-18 | Stop reason: HOSPADM

## 2019-12-16 RX ORDER — POTASSIUM CHLORIDE 750 MG/1
10 CAPSULE, EXTENDED RELEASE ORAL DAILY
Status: DISCONTINUED | OUTPATIENT
Start: 2019-12-16 | End: 2019-12-18 | Stop reason: HOSPADM

## 2019-12-16 RX ORDER — BENZONATATE 100 MG/1
200 CAPSULE ORAL EVERY 4 HOURS PRN
Status: DISCONTINUED | OUTPATIENT
Start: 2019-12-16 | End: 2019-12-18 | Stop reason: HOSPADM

## 2019-12-16 RX ORDER — CALCIUM CARBONATE 200(500)MG
2 TABLET,CHEWABLE ORAL 2 TIMES DAILY PRN
Status: DISCONTINUED | OUTPATIENT
Start: 2019-12-16 | End: 2019-12-18 | Stop reason: HOSPADM

## 2019-12-16 RX ORDER — ACETAMINOPHEN 160 MG/5ML
650 SOLUTION ORAL EVERY 4 HOURS PRN
Status: DISCONTINUED | OUTPATIENT
Start: 2019-12-16 | End: 2019-12-18 | Stop reason: HOSPADM

## 2019-12-16 RX ORDER — TORSEMIDE 20 MG/1
20 TABLET ORAL 2 TIMES DAILY
Status: DISCONTINUED | OUTPATIENT
Start: 2019-12-16 | End: 2019-12-18 | Stop reason: HOSPADM

## 2019-12-16 RX ORDER — ONDANSETRON 2 MG/ML
4 INJECTION INTRAMUSCULAR; INTRAVENOUS EVERY 6 HOURS PRN
Status: DISCONTINUED | OUTPATIENT
Start: 2019-12-16 | End: 2019-12-18 | Stop reason: HOSPADM

## 2019-12-16 RX ORDER — NALOXONE HCL 0.4 MG/ML
0.4 VIAL (ML) INJECTION
Status: DISCONTINUED | OUTPATIENT
Start: 2019-12-16 | End: 2019-12-18 | Stop reason: HOSPADM

## 2019-12-16 RX ORDER — WARFARIN SODIUM 3 MG/1
4.5 TABLET ORAL SEE ADMIN INSTRUCTIONS
COMMUNITY
End: 2020-03-16 | Stop reason: SDUPTHER

## 2019-12-16 RX ORDER — EZETIMIBE 10 MG/1
10 TABLET ORAL DAILY
COMMUNITY
End: 2020-04-23 | Stop reason: SDUPTHER

## 2019-12-16 RX ORDER — TORSEMIDE 20 MG/1
20 TABLET ORAL 2 TIMES DAILY
COMMUNITY
End: 2020-02-17 | Stop reason: SDUPTHER

## 2019-12-16 RX ORDER — WARFARIN SODIUM 3 MG/1
3 TABLET ORAL SEE ADMIN INSTRUCTIONS
Status: DISCONTINUED | OUTPATIENT
Start: 2019-12-16 | End: 2019-12-16 | Stop reason: DRUGHIGH

## 2019-12-16 RX ORDER — ATORVASTATIN CALCIUM 40 MG/1
40 TABLET, FILM COATED ORAL DAILY
COMMUNITY
End: 2020-04-23 | Stop reason: SDUPTHER

## 2019-12-16 RX ORDER — ACETAMINOPHEN 325 MG/1
650 TABLET ORAL EVERY 4 HOURS PRN
Status: DISCONTINUED | OUTPATIENT
Start: 2019-12-16 | End: 2019-12-18 | Stop reason: HOSPADM

## 2019-12-16 RX ORDER — MAGNESIUM SULFATE HEPTAHYDRATE 40 MG/ML
4 INJECTION, SOLUTION INTRAVENOUS AS NEEDED
Status: DISCONTINUED | OUTPATIENT
Start: 2019-12-16 | End: 2019-12-18 | Stop reason: HOSPADM

## 2019-12-16 RX ORDER — GUAIFENESIN 600 MG/1
1200 TABLET, EXTENDED RELEASE ORAL EVERY 12 HOURS
Status: DISCONTINUED | OUTPATIENT
Start: 2019-12-16 | End: 2019-12-18 | Stop reason: HOSPADM

## 2019-12-16 RX ORDER — IPRATROPIUM BROMIDE AND ALBUTEROL SULFATE 2.5; .5 MG/3ML; MG/3ML
3 SOLUTION RESPIRATORY (INHALATION)
Status: DISCONTINUED | OUTPATIENT
Start: 2019-12-16 | End: 2019-12-16

## 2019-12-16 RX ORDER — MELATONIN
1000 DAILY
Status: ON HOLD | COMMUNITY

## 2019-12-16 RX ORDER — DOCUSATE SODIUM 100 MG/1
100 CAPSULE, LIQUID FILLED ORAL 2 TIMES DAILY PRN
Status: DISCONTINUED | OUTPATIENT
Start: 2019-12-16 | End: 2019-12-18 | Stop reason: HOSPADM

## 2019-12-16 RX ORDER — MAGNESIUM SULFATE HEPTAHYDRATE 40 MG/ML
2 INJECTION, SOLUTION INTRAVENOUS AS NEEDED
Status: DISCONTINUED | OUTPATIENT
Start: 2019-12-16 | End: 2019-12-18 | Stop reason: HOSPADM

## 2019-12-16 RX ORDER — L.ACID,PARA/B.BIFIDUM/S.THERM 8B CELL
1 CAPSULE ORAL DAILY
Status: DISCONTINUED | OUTPATIENT
Start: 2019-12-16 | End: 2019-12-18 | Stop reason: HOSPADM

## 2019-12-16 RX ORDER — MUSCLE RUB CREAM 100; 150 MG/G; MG/G
CREAM TOPICAL
Status: DISCONTINUED | OUTPATIENT
Start: 2019-12-16 | End: 2019-12-18 | Stop reason: HOSPADM

## 2019-12-16 RX ORDER — METHYLPREDNISOLONE SODIUM SUCCINATE 125 MG/2ML
125 INJECTION, POWDER, LYOPHILIZED, FOR SOLUTION INTRAMUSCULAR; INTRAVENOUS EVERY 6 HOURS
Status: DISCONTINUED | OUTPATIENT
Start: 2019-12-16 | End: 2019-12-16

## 2019-12-16 RX ORDER — ALBUTEROL SULFATE 2.5 MG/3ML
2.5 SOLUTION RESPIRATORY (INHALATION) EVERY 4 HOURS PRN
Status: DISCONTINUED | OUTPATIENT
Start: 2019-12-16 | End: 2019-12-18 | Stop reason: HOSPADM

## 2019-12-16 RX ORDER — WARFARIN SODIUM 3 MG/1
3 TABLET ORAL SEE ADMIN INSTRUCTIONS
COMMUNITY
End: 2020-03-16 | Stop reason: SDUPTHER

## 2019-12-16 RX ORDER — ALBUTEROL SULFATE 90 UG/1
2 AEROSOL, METERED RESPIRATORY (INHALATION) EVERY 6 HOURS PRN
Status: ON HOLD | COMMUNITY

## 2019-12-16 RX ORDER — VIT A/VIT C/VIT E/ZINC/COPPER 2148-113
2 TABLET ORAL DAILY
COMMUNITY
End: 2020-01-07

## 2019-12-16 RX ORDER — POTASSIUM CHLORIDE 750 MG/1
40 CAPSULE, EXTENDED RELEASE ORAL AS NEEDED
Status: DISCONTINUED | OUTPATIENT
Start: 2019-12-16 | End: 2019-12-18 | Stop reason: HOSPADM

## 2019-12-16 RX ORDER — POTASSIUM CHLORIDE 1.5 G/1.77G
40 POWDER, FOR SOLUTION ORAL AS NEEDED
Status: DISCONTINUED | OUTPATIENT
Start: 2019-12-16 | End: 2019-12-18 | Stop reason: HOSPADM

## 2019-12-16 RX ORDER — VERAPAMIL HYDROCHLORIDE 120 MG/1
120 TABLET, FILM COATED ORAL ONCE
Status: COMPLETED | OUTPATIENT
Start: 2019-12-16 | End: 2019-12-16

## 2019-12-16 RX ORDER — CETIRIZINE HYDROCHLORIDE 10 MG/1
10 TABLET ORAL DAILY
Status: ON HOLD | COMMUNITY

## 2019-12-16 RX ORDER — FERROUS SULFATE 325(65) MG
325 TABLET ORAL
COMMUNITY
End: 2019-12-19 | Stop reason: SDUPTHER

## 2019-12-16 RX ORDER — MORPHINE SULFATE 2 MG/ML
1 INJECTION, SOLUTION INTRAMUSCULAR; INTRAVENOUS EVERY 4 HOURS PRN
Status: DISCONTINUED | OUTPATIENT
Start: 2019-12-16 | End: 2019-12-18 | Stop reason: HOSPADM

## 2019-12-16 RX ORDER — NITROGLYCERIN 0.4 MG/1
0.4 TABLET SUBLINGUAL
Status: DISCONTINUED | OUTPATIENT
Start: 2019-12-16 | End: 2019-12-18 | Stop reason: HOSPADM

## 2019-12-16 RX ORDER — LOSARTAN POTASSIUM 50 MG/1
100 TABLET ORAL DAILY
Status: ON HOLD | COMMUNITY

## 2019-12-16 RX ORDER — ALBUTEROL SULFATE 2.5 MG/3ML
2.5 SOLUTION RESPIRATORY (INHALATION) ONCE
Status: COMPLETED | OUTPATIENT
Start: 2019-12-16 | End: 2019-12-16

## 2019-12-16 RX ORDER — SACCHAROMYCES BOULARDII 250 MG
250 CAPSULE ORAL 2 TIMES DAILY
Status: DISCONTINUED | OUTPATIENT
Start: 2019-12-16 | End: 2019-12-18 | Stop reason: HOSPADM

## 2019-12-16 RX ORDER — VERAPAMIL HYDROCHLORIDE 360 MG/1
360 CAPSULE, DELAYED RELEASE PELLETS ORAL NIGHTLY
Status: DISCONTINUED | OUTPATIENT
Start: 2019-12-16 | End: 2019-12-16 | Stop reason: CLARIF

## 2019-12-16 RX ORDER — FUROSEMIDE 10 MG/ML
40 INJECTION INTRAMUSCULAR; INTRAVENOUS ONCE
Status: COMPLETED | OUTPATIENT
Start: 2019-12-16 | End: 2019-12-16

## 2019-12-16 RX ORDER — CETIRIZINE HYDROCHLORIDE 10 MG/1
10 TABLET ORAL DAILY
Status: DISCONTINUED | OUTPATIENT
Start: 2019-12-16 | End: 2019-12-18 | Stop reason: HOSPADM

## 2019-12-16 RX ORDER — VERAPAMIL HYDROCHLORIDE 360 MG/1
360 CAPSULE, DELAYED RELEASE PELLETS ORAL NIGHTLY
Qty: 90 CAPSULE | Refills: 3 | Status: SHIPPED | OUTPATIENT
Start: 2019-12-16 | End: 2020-02-19 | Stop reason: SDUPTHER

## 2019-12-16 RX ADMIN — ALBUTEROL SULFATE 2.5 MG: 2.5 SOLUTION RESPIRATORY (INHALATION) at 16:06

## 2019-12-16 RX ADMIN — VERAPAMIL HYDROCHLORIDE 120 MG: 120 TABLET, FILM COATED ORAL at 20:43

## 2019-12-16 RX ADMIN — CETIRIZINE HYDROCHLORIDE 10 MG: 10 TABLET, FILM COATED ORAL at 20:43

## 2019-12-16 RX ADMIN — FLUTICASONE PROPIONATE 2 SPRAY: 50 SPRAY, METERED NASAL at 20:43

## 2019-12-16 RX ADMIN — METHYLPREDNISOLONE SODIUM SUCCINATE 125 MG: 125 INJECTION, POWDER, FOR SOLUTION INTRAMUSCULAR; INTRAVENOUS at 19:05

## 2019-12-16 RX ADMIN — IPRATROPIUM BROMIDE AND ALBUTEROL SULFATE 3 ML: 2.5; .5 SOLUTION RESPIRATORY (INHALATION) at 19:40

## 2019-12-16 RX ADMIN — LOSARTAN POTASSIUM 100 MG: 100 TABLET, FILM COATED ORAL at 20:44

## 2019-12-16 RX ADMIN — POTASSIUM CHLORIDE 10 MEQ: 750 CAPSULE, EXTENDED RELEASE ORAL at 19:04

## 2019-12-16 RX ADMIN — TORSEMIDE 20 MG: 20 TABLET ORAL at 20:44

## 2019-12-16 RX ADMIN — FUROSEMIDE 40 MG: 20 INJECTION, SOLUTION INTRAMUSCULAR; INTRAVENOUS at 16:35

## 2019-12-16 RX ADMIN — METOPROLOL TARTRATE 5 MG: 5 INJECTION, SOLUTION INTRAVENOUS at 16:31

## 2019-12-16 RX ADMIN — METHYLPREDNISOLONE SODIUM SUCCINATE 125 MG: 125 INJECTION, POWDER, FOR SOLUTION INTRAMUSCULAR; INTRAVENOUS at 15:36

## 2019-12-16 RX ADMIN — Medication 250 MG: at 20:44

## 2019-12-16 RX ADMIN — SODIUM CHLORIDE 4 ML: 7 NEBU SOLN,3 % NEBU at 19:43

## 2019-12-16 RX ADMIN — Medication 1 CAPSULE: at 20:44

## 2019-12-16 RX ADMIN — DOXYCYCLINE 100 MG: 100 INJECTION, POWDER, LYOPHILIZED, FOR SOLUTION INTRAVENOUS at 20:43

## 2019-12-16 RX ADMIN — BUDESONIDE AND FORMOTEROL FUMARATE DIHYDRATE 2 PUFF: 160; 4.5 AEROSOL RESPIRATORY (INHALATION) at 23:01

## 2019-12-16 RX ADMIN — GUAIFENESIN 1200 MG: 600 TABLET, EXTENDED RELEASE ORAL at 20:45

## 2019-12-17 ENCOUNTER — ANESTHESIA EVENT (OUTPATIENT)
Dept: GASTROENTEROLOGY | Facility: HOSPITAL | Age: 84
End: 2019-12-17

## 2019-12-17 ENCOUNTER — ANESTHESIA (OUTPATIENT)
Dept: GASTROENTEROLOGY | Facility: HOSPITAL | Age: 84
End: 2019-12-17

## 2019-12-17 LAB
ANION GAP SERPL CALCULATED.3IONS-SCNC: 12.8 MMOL/L (ref 5–15)
APPEARANCE FLD: ABNORMAL
BASOPHILS # BLD AUTO: 0 10*3/MM3 (ref 0–0.2)
BASOPHILS NFR BLD AUTO: 0 % (ref 0–1.5)
BUN BLD-MCNC: 17 MG/DL (ref 8–23)
BUN/CREAT SERPL: 21.8 (ref 7–25)
CALCIUM SPEC-SCNC: 8.5 MG/DL (ref 8.6–10.5)
CHLORIDE SERPL-SCNC: 103 MMOL/L (ref 98–107)
CHOLEST SERPL-MCNC: 159 MG/DL (ref 0–200)
CO2 SERPL-SCNC: 28.2 MMOL/L (ref 22–29)
COLOR FLD: ABNORMAL
CREAT BLD-MCNC: 0.78 MG/DL (ref 0.57–1)
DEPRECATED RDW RBC AUTO: 52.2 FL (ref 37–54)
EOSINOPHIL # BLD AUTO: 0 10*3/MM3 (ref 0–0.4)
EOSINOPHIL NFR BLD AUTO: 0 % (ref 0.3–6.2)
ERYTHROCYTE [DISTWIDTH] IN BLOOD BY AUTOMATED COUNT: 15.1 % (ref 12.3–15.4)
GFR SERPL CREATININE-BSD FRML MDRD: 70 ML/MIN/1.73
GLUCOSE BLD-MCNC: 174 MG/DL (ref 65–99)
HBA1C MFR BLD: 6.3 % (ref 4.8–5.6)
HCT VFR BLD AUTO: 29.2 % (ref 34–46.6)
HDLC SERPL-MCNC: 58 MG/DL (ref 40–60)
HGB BLD-MCNC: 10.1 G/DL (ref 12–15.9)
IMM GRANULOCYTES # BLD AUTO: 0.01 10*3/MM3 (ref 0–0.05)
IMM GRANULOCYTES NFR BLD AUTO: 0.3 % (ref 0–0.5)
INR PPP: 1.91 (ref 0.9–1.1)
LDLC SERPL CALC-MCNC: 86 MG/DL (ref 0–100)
LDLC/HDLC SERPL: 1.48 {RATIO}
LYMPHOCYTES # BLD AUTO: 0.54 10*3/MM3 (ref 0.7–3.1)
LYMPHOCYTES NFR BLD AUTO: 18.1 % (ref 19.6–45.3)
MCH RBC QN AUTO: 33 PG (ref 26.6–33)
MCHC RBC AUTO-ENTMCNC: 34.6 G/DL (ref 31.5–35.7)
MCV RBC AUTO: 95.4 FL (ref 79–97)
MONOCYTES # BLD AUTO: 0.08 10*3/MM3 (ref 0.1–0.9)
MONOCYTES NFR BLD AUTO: 2.7 % (ref 5–12)
MONOS+MACROS NFR FLD: 13 %
NEUTROPHILS # BLD AUTO: 2.35 10*3/MM3 (ref 1.7–7)
NEUTROPHILS NFR BLD AUTO: 78.9 % (ref 42.7–76)
NEUTROPHILS NFR FLD MANUAL: 87 %
NRBC BLD AUTO-RTO: 0 /100 WBC (ref 0–0.2)
PLATELET # BLD AUTO: 193 10*3/MM3 (ref 140–450)
PMV BLD AUTO: 10.1 FL (ref 6–12)
POTASSIUM BLD-SCNC: 3.4 MMOL/L (ref 3.5–5.2)
PROTHROMBIN TIME: 21.5 SECONDS (ref 11.7–14.2)
RBC # BLD AUTO: 3.06 10*6/MM3 (ref 3.77–5.28)
RBC # FLD AUTO: ABNORMAL /MM3
SODIUM BLD-SCNC: 144 MMOL/L (ref 136–145)
TRIGL SERPL-MCNC: 76 MG/DL (ref 0–150)
TROPONIN T SERPL-MCNC: <0.01 NG/ML (ref 0–0.03)
VLDLC SERPL-MCNC: 15.2 MG/DL (ref 5–40)
WBC # FLD AUTO: 890 /MM3
WBC NRBC COR # BLD: 2.98 10*3/MM3 (ref 3.4–10.8)

## 2019-12-17 PROCEDURE — 83036 HEMOGLOBIN GLYCOSYLATED A1C: CPT | Performed by: INTERNAL MEDICINE

## 2019-12-17 PROCEDURE — 0BDM8ZX EXTRACTION OF BILATERAL LUNGS, VIA NATURAL OR ARTIFICIAL OPENING ENDOSCOPIC, DIAGNOSTIC: ICD-10-PCS | Performed by: INTERNAL MEDICINE

## 2019-12-17 PROCEDURE — 25010000002 PROPOFOL 10 MG/ML EMULSION: Performed by: ANESTHESIOLOGY

## 2019-12-17 PROCEDURE — 87205 SMEAR GRAM STAIN: CPT | Performed by: INTERNAL MEDICINE

## 2019-12-17 PROCEDURE — 87102 FUNGUS ISOLATION CULTURE: CPT | Performed by: INTERNAL MEDICINE

## 2019-12-17 PROCEDURE — 93010 ELECTROCARDIOGRAM REPORT: CPT | Performed by: INTERNAL MEDICINE

## 2019-12-17 PROCEDURE — 80061 LIPID PANEL: CPT | Performed by: INTERNAL MEDICINE

## 2019-12-17 PROCEDURE — 0BCB8ZZ EXTIRPATION OF MATTER FROM LEFT LOWER LOBE BRONCHUS, VIA NATURAL OR ARTIFICIAL OPENING ENDOSCOPIC: ICD-10-PCS | Performed by: INTERNAL MEDICINE

## 2019-12-17 PROCEDURE — 94799 UNLISTED PULMONARY SVC/PX: CPT

## 2019-12-17 PROCEDURE — 84484 ASSAY OF TROPONIN QUANT: CPT | Performed by: INTERNAL MEDICINE

## 2019-12-17 PROCEDURE — 0BC18ZZ EXTIRPATION OF MATTER FROM TRACHEA, VIA NATURAL OR ARTIFICIAL OPENING ENDOSCOPIC: ICD-10-PCS | Performed by: INTERNAL MEDICINE

## 2019-12-17 PROCEDURE — 80048 BASIC METABOLIC PNL TOTAL CA: CPT | Performed by: INTERNAL MEDICINE

## 2019-12-17 PROCEDURE — 99222 1ST HOSP IP/OBS MODERATE 55: CPT | Performed by: INTERNAL MEDICINE

## 2019-12-17 PROCEDURE — 88305 TISSUE EXAM BY PATHOLOGIST: CPT | Performed by: INTERNAL MEDICINE

## 2019-12-17 PROCEDURE — 88112 CYTOPATH CELL ENHANCE TECH: CPT | Performed by: INTERNAL MEDICINE

## 2019-12-17 PROCEDURE — 85025 COMPLETE CBC W/AUTO DIFF WBC: CPT | Performed by: INTERNAL MEDICINE

## 2019-12-17 PROCEDURE — 87206 SMEAR FLUORESCENT/ACID STAI: CPT | Performed by: INTERNAL MEDICINE

## 2019-12-17 PROCEDURE — 87186 SC STD MICRODIL/AGAR DIL: CPT | Performed by: INTERNAL MEDICINE

## 2019-12-17 PROCEDURE — 87116 MYCOBACTERIA CULTURE: CPT | Performed by: INTERNAL MEDICINE

## 2019-12-17 PROCEDURE — 89051 BODY FLUID CELL COUNT: CPT | Performed by: INTERNAL MEDICINE

## 2019-12-17 PROCEDURE — 87077 CULTURE AEROBIC IDENTIFY: CPT | Performed by: INTERNAL MEDICINE

## 2019-12-17 PROCEDURE — 85610 PROTHROMBIN TIME: CPT | Performed by: INTERNAL MEDICINE

## 2019-12-17 PROCEDURE — 87070 CULTURE OTHR SPECIMN AEROBIC: CPT | Performed by: INTERNAL MEDICINE

## 2019-12-17 PROCEDURE — 0BC68ZZ EXTIRPATION OF MATTER FROM RIGHT LOWER LOBE BRONCHUS, VIA NATURAL OR ARTIFICIAL OPENING ENDOSCOPIC: ICD-10-PCS | Performed by: INTERNAL MEDICINE

## 2019-12-17 RX ORDER — GLYCOPYRROLATE 0.2 MG/ML
INJECTION INTRAMUSCULAR; INTRAVENOUS AS NEEDED
Status: DISCONTINUED | OUTPATIENT
Start: 2019-12-17 | End: 2019-12-17 | Stop reason: SURG

## 2019-12-17 RX ORDER — PROPOFOL 10 MG/ML
VIAL (ML) INTRAVENOUS CONTINUOUS PRN
Status: DISCONTINUED | OUTPATIENT
Start: 2019-12-17 | End: 2019-12-17 | Stop reason: SURG

## 2019-12-17 RX ORDER — LIDOCAINE HYDROCHLORIDE 10 MG/ML
INJECTION, SOLUTION EPIDURAL; INFILTRATION; INTRACAUDAL; PERINEURAL AS NEEDED
Status: DISCONTINUED | OUTPATIENT
Start: 2019-12-17 | End: 2019-12-17 | Stop reason: HOSPADM

## 2019-12-17 RX ORDER — LIDOCAINE HYDROCHLORIDE 20 MG/ML
INJECTION, SOLUTION INFILTRATION; PERINEURAL AS NEEDED
Status: DISCONTINUED | OUTPATIENT
Start: 2019-12-17 | End: 2019-12-17 | Stop reason: SURG

## 2019-12-17 RX ORDER — SODIUM CHLORIDE 9 MG/ML
30 INJECTION, SOLUTION INTRAVENOUS CONTINUOUS PRN
Status: DISCONTINUED | OUTPATIENT
Start: 2019-12-17 | End: 2019-12-18 | Stop reason: HOSPADM

## 2019-12-17 RX ORDER — PROPOFOL 10 MG/ML
VIAL (ML) INTRAVENOUS AS NEEDED
Status: DISCONTINUED | OUTPATIENT
Start: 2019-12-17 | End: 2019-12-17 | Stop reason: SURG

## 2019-12-17 RX ORDER — LIDOCAINE HYDROCHLORIDE 20 MG/ML
INJECTION, SOLUTION EPIDURAL; INFILTRATION; INTRACAUDAL; PERINEURAL AS NEEDED
Status: DISCONTINUED | OUTPATIENT
Start: 2019-12-17 | End: 2019-12-17 | Stop reason: HOSPADM

## 2019-12-17 RX ORDER — WARFARIN SODIUM 6 MG/1
6 TABLET ORAL
Status: DISCONTINUED | OUTPATIENT
Start: 2019-12-17 | End: 2019-12-18

## 2019-12-17 RX ORDER — LIDOCAINE HYDROCHLORIDE 40 MG/ML
SOLUTION TOPICAL AS NEEDED
Status: DISCONTINUED | OUTPATIENT
Start: 2019-12-17 | End: 2019-12-17 | Stop reason: SURG

## 2019-12-17 RX ADMIN — PROPOFOL 30 MG: 10 INJECTION, EMULSION INTRAVENOUS at 14:14

## 2019-12-17 RX ADMIN — PROPOFOL 100 MG: 10 INJECTION, EMULSION INTRAVENOUS at 14:01

## 2019-12-17 RX ADMIN — Medication 250 MG: at 09:05

## 2019-12-17 RX ADMIN — SODIUM CHLORIDE 4 ML: 7 NEBU SOLN,3 % NEBU at 10:37

## 2019-12-17 RX ADMIN — Medication 250 MG: at 20:29

## 2019-12-17 RX ADMIN — FLUTICASONE PROPIONATE 2 SPRAY: 50 SPRAY, METERED NASAL at 09:06

## 2019-12-17 RX ADMIN — IPRATROPIUM BROMIDE AND ALBUTEROL SULFATE 3 ML: 2.5; .5 SOLUTION RESPIRATORY (INHALATION) at 03:11

## 2019-12-17 RX ADMIN — Medication 1 CAPSULE: at 09:04

## 2019-12-17 RX ADMIN — VERAPAMIL HYDROCHLORIDE 180 MG: 180 TABLET, FILM COATED, EXTENDED RELEASE ORAL at 20:29

## 2019-12-17 RX ADMIN — PROPOFOL 50 MG: 10 INJECTION, EMULSION INTRAVENOUS at 14:04

## 2019-12-17 RX ADMIN — PROPOFOL 75 MG: 10 INJECTION, EMULSION INTRAVENOUS at 14:08

## 2019-12-17 RX ADMIN — GLYCOPYRROLATE 0.2 MCG: 0.2 INJECTION INTRAMUSCULAR; INTRAVENOUS at 13:57

## 2019-12-17 RX ADMIN — BUDESONIDE AND FORMOTEROL FUMARATE DIHYDRATE 2 PUFF: 160; 4.5 AEROSOL RESPIRATORY (INHALATION) at 19:33

## 2019-12-17 RX ADMIN — LOSARTAN POTASSIUM 100 MG: 100 TABLET, FILM COATED ORAL at 09:05

## 2019-12-17 RX ADMIN — SODIUM CHLORIDE, PRESERVATIVE FREE 10 ML: 5 INJECTION INTRAVENOUS at 09:06

## 2019-12-17 RX ADMIN — SODIUM CHLORIDE 30 ML/HR: 9 INJECTION, SOLUTION INTRAVENOUS at 13:46

## 2019-12-17 RX ADMIN — IPRATROPIUM BROMIDE AND ALBUTEROL SULFATE 3 ML: 2.5; .5 SOLUTION RESPIRATORY (INHALATION) at 10:32

## 2019-12-17 RX ADMIN — CETIRIZINE HYDROCHLORIDE 10 MG: 10 TABLET, FILM COATED ORAL at 09:05

## 2019-12-17 RX ADMIN — NYSTATIN 500000 UNITS: 100000 SUSPENSION ORAL at 20:30

## 2019-12-17 RX ADMIN — DOXYCYCLINE 100 MG: 100 INJECTION, POWDER, LYOPHILIZED, FOR SOLUTION INTRAVENOUS at 06:39

## 2019-12-17 RX ADMIN — LIDOCAINE HYDROCHLORIDE 1 EACH: 40 SOLUTION TOPICAL at 14:00

## 2019-12-17 RX ADMIN — PROPOFOL 100 MG: 10 INJECTION, EMULSION INTRAVENOUS at 14:00

## 2019-12-17 RX ADMIN — TORSEMIDE 20 MG: 20 TABLET ORAL at 09:05

## 2019-12-17 RX ADMIN — BUDESONIDE AND FORMOTEROL FUMARATE DIHYDRATE 2 PUFF: 160; 4.5 AEROSOL RESPIRATORY (INHALATION) at 08:52

## 2019-12-17 RX ADMIN — DOXYCYCLINE 100 MG: 100 INJECTION, POWDER, LYOPHILIZED, FOR SOLUTION INTRAVENOUS at 18:35

## 2019-12-17 RX ADMIN — POTASSIUM CHLORIDE 10 MEQ: 750 CAPSULE, EXTENDED RELEASE ORAL at 09:05

## 2019-12-17 RX ADMIN — GUAIFENESIN 1200 MG: 600 TABLET, EXTENDED RELEASE ORAL at 18:35

## 2019-12-17 RX ADMIN — GUAIFENESIN 1200 MG: 600 TABLET, EXTENDED RELEASE ORAL at 06:39

## 2019-12-17 RX ADMIN — IPRATROPIUM BROMIDE AND ALBUTEROL SULFATE 3 ML: 2.5; .5 SOLUTION RESPIRATORY (INHALATION) at 15:51

## 2019-12-17 RX ADMIN — VERAPAMIL HYDROCHLORIDE 180 MG: 180 TABLET, FILM COATED, EXTENDED RELEASE ORAL at 09:05

## 2019-12-17 RX ADMIN — Medication 10 ML: at 13:45

## 2019-12-17 RX ADMIN — NYSTATIN 500000 UNITS: 100000 SUSPENSION ORAL at 18:35

## 2019-12-17 RX ADMIN — LIDOCAINE HYDROCHLORIDE 60 MG: 20 INJECTION, SOLUTION INFILTRATION; PERINEURAL at 14:00

## 2019-12-17 RX ADMIN — TORSEMIDE 20 MG: 20 TABLET ORAL at 20:29

## 2019-12-17 RX ADMIN — WARFARIN SODIUM 6 MG: 6 TABLET ORAL at 18:35

## 2019-12-17 RX ADMIN — PROPOFOL 120 MCG/KG/MIN: 10 INJECTION, EMULSION INTRAVENOUS at 14:00

## 2019-12-17 RX ADMIN — NYSTATIN 500000 UNITS: 100000 SUSPENSION ORAL at 09:04

## 2019-12-17 NOTE — ANESTHESIA PREPROCEDURE EVALUATION
Anesthesia Evaluation     Patient summary reviewed and Nursing notes reviewed   history of anesthetic complications (had a child who  from [facility]): malignant hyperthermia               Airway   Mallampati: II  TM distance: >3 FB  Neck ROM: full  no difficulty expected  Dental - normal exam   (+) poor dentition    Pulmonary    (+) pneumonia worsening , COPD severe, asthma,shortness of breath, recent URI, rhonchi, rales,   (-) sleep apnea, decreased breath sounds, wheezes  Cardiovascular - normal exam  Exercise tolerance: good (4-7 METS)    Rhythm: regular  Rate: normal    (+) hypertension, valvular problems/murmurs MR, CAD, dysrhythmias Atrial Fib, CHF , RAMOS, hyperlipidemia,   (-) past MI, angina, orthopnea, PND, PVD      Neuro/Psych  (+) psychiatric history Depression,     (-) seizures, neuromuscular disease, TIA, CVA, dizziness/light headedness, weakness, numbness  GI/Hepatic/Renal/Endo - negative ROS   (-) liver disease, diabetes    Musculoskeletal (-) negative ROS    Abdominal  - normal exam   Substance History - negative use  (-) alcohol use, drug use     OB/GYN negative ob/gyn ROS         Other - negative ROS                     Anesthesia Plan    ASA 4     MAC     intravenous induction     Anesthetic plan, all risks, benefits, and alternatives have been provided, discussed and informed consent has been obtained with: patient.

## 2019-12-17 NOTE — ANESTHESIA POSTPROCEDURE EVALUATION
"Patient: Agnieszka Rizzo    Procedure Summary     Date:  12/17/19 Room / Location:   [facility] / [facility]    Anesthesia Start:  1357 Anesthesia Stop:  1428    Procedure:  BRONCHOSCOPY WITH WASHINGS (N/A Bronchus) Diagnosis:       Acute bronchitis due to parainfluenza virus      (Acute bronchitis due to parainfluenza virus [J20.4])    Surgeon:  Rogelio Tucker MD Provider:  Gregory Sexton MD    Anesthesia Type:  MAC ASA Status:  4          Anesthesia Type: MAC    Vitals  Vitals Value Taken Time   /68 12/17/2019  2:54 PM   Temp     Pulse 80 12/17/2019  2:54 PM   Resp 14 12/17/2019  2:54 PM   SpO2 95 % 12/17/2019  2:54 PM           Post Anesthesia Care and Evaluation    Patient location during evaluation: bedside  Patient participation: complete - patient participated  Level of consciousness: awake and alert  Pain management: adequate  Airway patency: patent  Anesthetic complications: No anesthetic complications    Cardiovascular status: acceptable  Respiratory status: acceptable  Hydration status: acceptable    Comments: /65 (BP Location: Left arm, Patient Position: Sitting)   Pulse 88   Temp 36.4 °C (97.6 °F) (Oral)   Resp 18   Ht 155.4 cm (61.2\")   Wt 55.9 kg (123 lb 4.8 oz)   SpO2 94%   BMI 23.15 kg/m²       "

## 2019-12-17 NOTE — ANESTHESIA PROCEDURE NOTES
Airway  Urgency: elective    Date/Time: 12/17/2019 2:05 PM  Airway not difficult    General Information and Staff    Patient location during procedure: OR  Anesthesiologist: Gregory Sexton MD    Indications and Patient Condition  Indications for airway management: airway protection    Preoxygenated: yes      Final Airway Details  Final airway type: supraglottic airway      Successful airway: bronch  Size 4    Number of attempts at approach: 1  Assessment: lips, teeth, and gum same as pre-op and atraumatic intubation

## 2019-12-18 ENCOUNTER — TELEPHONE (OUTPATIENT)
Dept: CARDIOLOGY | Facility: CLINIC | Age: 84
End: 2019-12-18

## 2019-12-18 VITALS
DIASTOLIC BLOOD PRESSURE: 65 MMHG | HEIGHT: 61 IN | BODY MASS INDEX: 23.28 KG/M2 | TEMPERATURE: 97.4 F | HEART RATE: 79 BPM | OXYGEN SATURATION: 98 % | WEIGHT: 123.3 LBS | SYSTOLIC BLOOD PRESSURE: 133 MMHG | RESPIRATION RATE: 20 BRPM

## 2019-12-18 LAB
ANION GAP SERPL CALCULATED.3IONS-SCNC: 13.1 MMOL/L (ref 5–15)
BASOPHILS # BLD AUTO: 0.01 10*3/MM3 (ref 0–0.2)
BASOPHILS NFR BLD AUTO: 0.1 % (ref 0–1.5)
BUN BLD-MCNC: 20 MG/DL (ref 8–23)
BUN/CREAT SERPL: 27 (ref 7–25)
CALCIUM SPEC-SCNC: 8.5 MG/DL (ref 8.6–10.5)
CHLORIDE SERPL-SCNC: 105 MMOL/L (ref 98–107)
CO2 SERPL-SCNC: 25.9 MMOL/L (ref 22–29)
CREAT BLD-MCNC: 0.74 MG/DL (ref 0.57–1)
DEPRECATED RDW RBC AUTO: 51.5 FL (ref 37–54)
EOSINOPHIL # BLD AUTO: 0 10*3/MM3 (ref 0–0.4)
EOSINOPHIL NFR BLD AUTO: 0 % (ref 0.3–6.2)
ERYTHROCYTE [DISTWIDTH] IN BLOOD BY AUTOMATED COUNT: 14.9 % (ref 12.3–15.4)
GFR SERPL CREATININE-BSD FRML MDRD: 74 ML/MIN/1.73
GLUCOSE BLD-MCNC: 113 MG/DL (ref 65–99)
HCT VFR BLD AUTO: 27.9 % (ref 34–46.6)
HGB BLD-MCNC: 9.4 G/DL (ref 12–15.9)
IMM GRANULOCYTES # BLD AUTO: 0.07 10*3/MM3 (ref 0–0.05)
IMM GRANULOCYTES NFR BLD AUTO: 0.6 % (ref 0–0.5)
INR PPP: 2.27 (ref 0.9–1.1)
LYMPHOCYTES # BLD AUTO: 1.26 10*3/MM3 (ref 0.7–3.1)
LYMPHOCYTES NFR BLD AUTO: 11.7 % (ref 19.6–45.3)
MCH RBC QN AUTO: 31.6 PG (ref 26.6–33)
MCHC RBC AUTO-ENTMCNC: 33.7 G/DL (ref 31.5–35.7)
MCV RBC AUTO: 93.9 FL (ref 79–97)
MONOCYTES # BLD AUTO: 0.63 10*3/MM3 (ref 0.1–0.9)
MONOCYTES NFR BLD AUTO: 5.8 % (ref 5–12)
NEUTROPHILS # BLD AUTO: 8.82 10*3/MM3 (ref 1.7–7)
NEUTROPHILS NFR BLD AUTO: 81.8 % (ref 42.7–76)
NRBC BLD AUTO-RTO: 0 /100 WBC (ref 0–0.2)
PLATELET # BLD AUTO: 200 10*3/MM3 (ref 140–450)
PMV BLD AUTO: 10.4 FL (ref 6–12)
POTASSIUM BLD-SCNC: 3.3 MMOL/L (ref 3.5–5.2)
PROTHROMBIN TIME: 24.7 SECONDS (ref 11.7–14.2)
RBC # BLD AUTO: 2.97 10*6/MM3 (ref 3.77–5.28)
SODIUM BLD-SCNC: 144 MMOL/L (ref 136–145)
WBC NRBC COR # BLD: 10.79 10*3/MM3 (ref 3.4–10.8)

## 2019-12-18 PROCEDURE — 99232 SBSQ HOSP IP/OBS MODERATE 35: CPT | Performed by: NURSE PRACTITIONER

## 2019-12-18 PROCEDURE — 80048 BASIC METABOLIC PNL TOTAL CA: CPT | Performed by: INTERNAL MEDICINE

## 2019-12-18 PROCEDURE — 94799 UNLISTED PULMONARY SVC/PX: CPT

## 2019-12-18 PROCEDURE — 85610 PROTHROMBIN TIME: CPT | Performed by: INTERNAL MEDICINE

## 2019-12-18 PROCEDURE — 85025 COMPLETE CBC W/AUTO DIFF WBC: CPT | Performed by: INTERNAL MEDICINE

## 2019-12-18 RX ORDER — WARFARIN SODIUM 3 MG/1
3 TABLET ORAL
Status: DISCONTINUED | OUTPATIENT
Start: 2019-12-19 | End: 2019-12-18 | Stop reason: HOSPADM

## 2019-12-18 RX ORDER — WARFARIN SODIUM 3 MG/1
3 TABLET ORAL
Status: DISCONTINUED | OUTPATIENT
Start: 2019-12-18 | End: 2019-12-18 | Stop reason: HOSPADM

## 2019-12-18 RX ORDER — SODIUM CHLORIDE FOR INHALATION 7 %
4 VIAL, NEBULIZER (ML) INHALATION
Qty: 480 ML | Refills: 2 | Status: SHIPPED | OUTPATIENT
Start: 2019-12-18 | End: 2019-12-18 | Stop reason: SDUPTHER

## 2019-12-18 RX ORDER — SODIUM CHLORIDE FOR INHALATION 7 %
4 VIAL, NEBULIZER (ML) INHALATION
Qty: 480 ML | Refills: 2 | Status: SHIPPED | OUTPATIENT
Start: 2019-12-18 | End: 2020-03-17

## 2019-12-18 RX ADMIN — IPRATROPIUM BROMIDE AND ALBUTEROL SULFATE 3 ML: 2.5; .5 SOLUTION RESPIRATORY (INHALATION) at 00:08

## 2019-12-18 RX ADMIN — FLUTICASONE PROPIONATE 2 SPRAY: 50 SPRAY, METERED NASAL at 10:24

## 2019-12-18 RX ADMIN — IPRATROPIUM BROMIDE AND ALBUTEROL SULFATE 3 ML: 2.5; .5 SOLUTION RESPIRATORY (INHALATION) at 16:35

## 2019-12-18 RX ADMIN — GUAIFENESIN 1200 MG: 600 TABLET, EXTENDED RELEASE ORAL at 08:43

## 2019-12-18 RX ADMIN — BUDESONIDE AND FORMOTEROL FUMARATE DIHYDRATE 2 PUFF: 160; 4.5 AEROSOL RESPIRATORY (INHALATION) at 08:54

## 2019-12-18 RX ADMIN — LOSARTAN POTASSIUM 100 MG: 100 TABLET, FILM COATED ORAL at 10:23

## 2019-12-18 RX ADMIN — NYSTATIN 500000 UNITS: 100000 SUSPENSION ORAL at 08:43

## 2019-12-18 RX ADMIN — IPRATROPIUM BROMIDE AND ALBUTEROL SULFATE 3 ML: 2.5; .5 SOLUTION RESPIRATORY (INHALATION) at 11:27

## 2019-12-18 RX ADMIN — TORSEMIDE 20 MG: 20 TABLET ORAL at 10:23

## 2019-12-18 RX ADMIN — POTASSIUM CHLORIDE 40 MEQ: 750 CAPSULE, EXTENDED RELEASE ORAL at 08:43

## 2019-12-18 RX ADMIN — SODIUM CHLORIDE 4 ML: 7 NEBU SOLN,3 % NEBU at 08:54

## 2019-12-18 RX ADMIN — SODIUM CHLORIDE 4 ML: 7 NEBU SOLN,3 % NEBU at 00:08

## 2019-12-18 RX ADMIN — VERAPAMIL HYDROCHLORIDE 180 MG: 180 TABLET, FILM COATED, EXTENDED RELEASE ORAL at 10:23

## 2019-12-18 RX ADMIN — Medication 1 CAPSULE: at 10:23

## 2019-12-18 RX ADMIN — CETIRIZINE HYDROCHLORIDE 10 MG: 10 TABLET, FILM COATED ORAL at 10:23

## 2019-12-18 RX ADMIN — Medication 250 MG: at 10:23

## 2019-12-18 RX ADMIN — NYSTATIN 500000 UNITS: 100000 SUSPENSION ORAL at 11:25

## 2019-12-18 NOTE — TELEPHONE ENCOUNTER
The patients daughter is calling and would like to talk to you about the patient who is currently admitted. She can be reached at [phone]    Thanks  Noe

## 2019-12-19 ENCOUNTER — READMISSION MANAGEMENT (OUTPATIENT)
Dept: CALL CENTER | Facility: HOSPITAL | Age: 84
End: 2019-12-19

## 2019-12-19 ENCOUNTER — OFFICE VISIT (OUTPATIENT)
Dept: INTERNAL MEDICINE | Facility: CLINIC | Age: 84
End: 2019-12-19

## 2019-12-19 VITALS
HEIGHT: 61 IN | BODY MASS INDEX: 23.81 KG/M2 | WEIGHT: 126.1 LBS | OXYGEN SATURATION: 94 % | DIASTOLIC BLOOD PRESSURE: 60 MMHG | SYSTOLIC BLOOD PRESSURE: 114 MMHG | TEMPERATURE: 97.2 F | HEART RATE: 75 BPM

## 2019-12-19 DIAGNOSIS — I48.91 ATRIAL FIBRILLATION WITH RAPID VENTRICULAR RESPONSE (HCC): Primary | ICD-10-CM

## 2019-12-19 DIAGNOSIS — N30.01 ACUTE CYSTITIS WITH HEMATURIA: ICD-10-CM

## 2019-12-19 DIAGNOSIS — J20.4 ACUTE BRONCHITIS DUE TO PARAINFLUENZA VIRUS: ICD-10-CM

## 2019-12-19 DIAGNOSIS — I10 BENIGN ESSENTIAL HYPERTENSION: ICD-10-CM

## 2019-12-19 DIAGNOSIS — J96.01 ACUTE RESPIRATORY FAILURE WITH HYPOXIA (HCC): ICD-10-CM

## 2019-12-19 DIAGNOSIS — J47.1 BRONCHIECTASIS WITH ACUTE EXACERBATION (HCC): ICD-10-CM

## 2019-12-19 PROBLEM — A41.9 SEPSIS (HCC): Status: RESOLVED | Noted: 2017-03-12 | Resolved: 2019-12-19

## 2019-12-19 PROBLEM — N39.0 UTI (URINARY TRACT INFECTION): Status: ACTIVE | Noted: 2019-12-19

## 2019-12-19 LAB
BILIRUB BLD-MCNC: NEGATIVE MG/DL
CLARITY, POC: CLEAR
COLOR UR: YELLOW
CYTO UR: NORMAL
GLUCOSE UR STRIP-MCNC: NEGATIVE MG/DL
KETONES UR QL: NEGATIVE
LAB AP CASE REPORT: NORMAL
LEUKOCYTE EST, POC: ABNORMAL
NITRITE UR-MCNC: NEGATIVE MG/ML
PATH REPORT.FINAL DX SPEC: NORMAL
PATH REPORT.GROSS SPEC: NORMAL
PH UR: 6.5 [PH] (ref 5–8)
PROT UR STRIP-MCNC: NEGATIVE MG/DL
RBC # UR STRIP: ABNORMAL /UL
SP GR UR: 1.01 (ref 1–1.03)
UROBILINOGEN UR QL: NORMAL

## 2019-12-19 PROCEDURE — 99214 OFFICE O/P EST MOD 30 MIN: CPT | Performed by: FAMILY MEDICINE

## 2019-12-19 PROCEDURE — 81003 URINALYSIS AUTO W/O SCOPE: CPT | Performed by: FAMILY MEDICINE

## 2019-12-19 RX ORDER — FERROUS SULFATE 325(65) MG
325 TABLET ORAL
Qty: 90 TABLET | Refills: 1 | Status: SHIPPED | OUTPATIENT
Start: 2019-12-19 | End: 2020-03-17 | Stop reason: SDUPTHER

## 2019-12-19 NOTE — OUTREACH NOTE
Prep Survey      Responses   Facility patient discharged from?  [facility]   Is patient eligible?  Yes   Discharge diagnosis  Acute bronchitis due to parainfluenza virus, COPD with acute exacerbation, Chronic diastolic CHF (congestive heart failure   Does the patient have one of the following disease processes/diagnoses(primary or secondary)?  COPD/Pneumonia   Does the patient have Home health ordered?  No   Is there a DME ordered?  No   Prep survey completed?  Yes          Merari Pickens RN

## 2019-12-19 NOTE — PROGRESS NOTES
Agnieszka Rizzo is a 89 y.o. female.      Assessment/Plan   Problem List Items Addressed This Visit        Cardiovascular and Mediastinum    Benign essential hypertension    Atrial fibrillation with rapid ventricular response (CMS/HCC) - Primary       Respiratory    Acute respiratory failure with hypoxia (CMS/[facility])    Bronchiectasis (CMS/[facility])    Overview     Dr. Tucker         Acute bronchitis due to parainfluenza virus    Overview     Added automatically from request for surgery [id number]            Genitourinary    UTI (urinary tract infection)    Relevant Orders    POCT urinalysis dipstick, automated (Completed)    Urine Culture - Urine, Urine, Clean Catch         Follow-up results of UA culture continue present discharge medications add Cepacol lozenges for laryngitis  Keep appointment with cardiology for ongoing management of atrial fibrillation CHF  Return in about 3 months (around 3/19/2020), or if symptoms worsen or fail to improve, for Recheck, Next scheduled follow up.      Chief Complaint   Patient presents with   • [facility] [place] follow up to abnormal EKG, COPD   • Laryngitis   • Urinary Urgency     Social History     Tobacco Use   • Smoking status: Never Smoker   • Smokeless tobacco: Never Used   • Tobacco comment: caffiene daily   Substance Use Topics   • Alcohol use: Yes     Comment: occasional   • Drug use: No       History of Present Illness   Patient follow-up after hospitalization admit date December 16 discharge date December 18 had posthospitalization phone call by Johana Andrews RN.  She still having difficulty talking and is not work-up with pulmonology regarding streaking in the pharynx she also has symptoms of urgency frequency dysuria urine since discharge no specific fever sweats or chills she has some hypokalemia and calcium Vesna on discharge medications therapeutic INR with anemia  The following portions of the patient's history were reviewed and updated as appropriate:PMHroutine: Social  "history , Allergies, Current Medications, Active Problem List and Health Maintenance  Current outpatient and discharge medications have been reconciled for the patient.  Reviewed by: Matt Rose MD  Review of Systems    Objective   Vitals:    12/19/19 1029   BP: 114/60   BP Location: Left arm   Patient Position: Sitting   Cuff Size: Adult   Pulse: 75   Temp: 97.2 °F (36.2 °C)   TempSrc: Oral   SpO2: 94%   Weight: 57.2 kg (126 lb 1.6 oz)   Height: 155.4 cm (61.2\")     Body mass index is 23.67 kg/m².  Physical Exam  Reviewed Data:  Office Visit on 12/19/2019   Component Date Value Ref Range Status   • Color 12/19/2019 Yellow  Yellow, Straw, Dark Yellow, Noemi Final   • Clarity, UA 12/19/2019 Clear  Clear Final   • Specific Gravity  12/19/2019 1.015  1.005 - 1.030 Final   • pH, Urine 12/19/2019 6.5  5.0 - 8.0 Final   • Leukocytes 12/19/2019 Moderate (2+)* Negative Final   • Nitrite, UA 12/19/2019 Negative  Negative Final   • Protein, POC 12/19/2019 Negative  Negative mg/dL Final   • Glucose, UA 12/19/2019 Negative  Negative, 1000 mg/dL (3+) mg/dL Final   • Ketones, UA 12/19/2019 Negative  Negative Final   • Urobilinogen, UA 12/19/2019 Normal  Normal Final   • Bilirubin 12/19/2019 Negative  Negative Final   • Blood, UA 12/19/2019 3+* Negative Final   Admission on 12/16/2019, Discharged on 12/18/2019   Component Date Value Ref Range Status   • Glucose 12/16/2019 96  65 - 99 mg/dL Final   • BUN 12/16/2019 14  8 - 23 mg/dL Final   • Creatinine 12/16/2019 0.82  0.57 - 1.00 mg/dL Final   • Sodium 12/16/2019 142  136 - 145 mmol/L Final   • Potassium 12/16/2019 3.1* 3.5 - 5.2 mmol/L Final   • Chloride 12/16/2019 99  98 - 107 mmol/L Final   • CO2 12/16/2019 31.7* 22.0 - 29.0 mmol/L Final   • Calcium 12/16/2019 9.0  8.6 - 10.5 mg/dL Final   • Total Protein 12/16/2019 7.2  6.0 - 8.5 g/dL Final   • Albumin 12/16/2019 3.80  3.50 - 5.20 g/dL Final   • ALT (SGPT) 12/16/2019 23  1 - 33 U/L Final   • AST (SGOT) 12/16/2019 26  1 - " 32 U/L Final   • Alkaline Phosphatase 12/16/2019 88  39 - 117 U/L Final   • Total Bilirubin 12/16/2019 0.8  0.2 - 1.2 mg/dL Final   • eGFR Non African Amer 12/16/2019 66  >60 mL/min/1.73 Final   • Globulin 12/16/2019 3.4  gm/dL Final   • A/G Ratio 12/16/2019 1.1  g/dL Final   • BUN/Creatinine Ratio 12/16/2019 17.1  7.0 - 25.0 Final   • Anion Gap 12/16/2019 11.3  5.0 - 15.0 mmol/L Final   • proBNP 12/16/2019 1,814.0* 5.0-1,800.0 pg/mL Final   • Troponin T 12/16/2019 <0.010  0.000 - 0.030 ng/mL Final   • Extra Tube 12/16/2019 hold for add-on   Final    Auto resulted   • Extra Tube 12/16/2019 Hold for add-ons.   Final    Auto resulted.   • Extra Tube 12/16/2019 hold for add-on   Final    Auto resulted   • Extra Tube 12/16/2019 Hold for add-ons.   Final    Auto resulted.   • WBC 12/16/2019 7.61  3.40 - 10.80 10*3/mm3 Final   • RBC 12/16/2019 3.40* 3.77 - 5.28 10*6/mm3 Final   • Hemoglobin 12/16/2019 11.0* 12.0 - 15.9 g/dL Final   • Hematocrit 12/16/2019 33.1* 34.0 - 46.6 % Final   • MCV 12/16/2019 97.4* 79.0 - 97.0 fL Final   • MCH 12/16/2019 32.4  26.6 - 33.0 pg Final   • MCHC 12/16/2019 33.2  31.5 - 35.7 g/dL Final   • RDW 12/16/2019 15.0  12.3 - 15.4 % Final   • RDW-SD 12/16/2019 53.4  37.0 - 54.0 fl Final   • MPV 12/16/2019 11.1  6.0 - 12.0 fL Final   • Platelets 12/16/2019 203  140 - 450 10*3/mm3 Final   • Neutrophil % 12/16/2019 60.9  42.7 - 76.0 % Final   • Lymphocyte % 12/16/2019 28.8  19.6 - 45.3 % Final   • Monocyte % 12/16/2019 8.0  5.0 - 12.0 % Final   • Eosinophil % 12/16/2019 1.3  0.3 - 6.2 % Final   • Basophil % 12/16/2019 0.3  0.0 - 1.5 % Final   • Immature Grans % 12/16/2019 0.7* 0.0 - 0.5 % Final   • Neutrophils, Absolute 12/16/2019 4.64  1.70 - 7.00 10*3/mm3 Final   • Lymphocytes, Absolute 12/16/2019 2.19  0.70 - 3.10 10*3/mm3 Final   • Monocytes, Absolute 12/16/2019 0.61  0.10 - 0.90 10*3/mm3 Final   • Eosinophils, Absolute 12/16/2019 0.10  0.00 - 0.40 10*3/mm3 Final   • Basophils, Absolute  12/16/2019 0.02  0.00 - 0.20 10*3/mm3 Final   • Immature Grans, Absolute 12/16/2019 0.05  0.00 - 0.05 10*3/mm3 Final   • nRBC 12/16/2019 0.0  0.0 - 0.2 /100 WBC Final   • ADENOVIRUS, PCR 12/16/2019 Not Detected  Not Detected Final   • Coronavirus 229E 12/16/2019 Not Detected  Not Detected Final   • Coronavirus HKU1 12/16/2019 Not Detected  Not Detected Final   • Coronavirus NL63 12/16/2019 Not Detected  Not Detected Final   • Coronavirus OC43 12/16/2019 Not Detected  Not Detected Final   • Human Metapneumovirus 12/16/2019 Not Detected  Not Detected Final   • Human Rhinovirus/Enterovirus 12/16/2019 Not Detected  Not Detected Final   • Influenza B PCR 12/16/2019 Not Detected  Not Detected Final   • Parainfluenza Virus 1 12/16/2019 Detected* Not Detected Final   • Parainfluenza Virus 2 12/16/2019 Not Detected  Not Detected Final   • Parainfluenza Virus 3 12/16/2019 Not Detected  Not Detected Final   • Parainfluenza Virus 4 12/16/2019 Not Detected  Not Detected Final   • Bordetella pertussis pcr 12/16/2019 Not Detected  Not Detected Final   • Influenza A H1 2009 PCR 12/16/2019 Not Detected  Not Detected Final   • Chlamydophila pneumoniae PCR 12/16/2019 Not Detected  Not Detected Final   • Mycoplasma pneumo by PCR 12/16/2019 Not Detected  Not Detected Final   • Influenza A PCR 12/16/2019 Not Detected  Not Detected Final   • Influenza A H3 12/16/2019 Not Detected  Not Detected Final   • Influenza A H1 12/16/2019 Not Detected  Not Detected Final   • RSV, PCR 12/16/2019 Not Detected  Not Detected Final   • Bordetella parapertussis PCR 12/16/2019 Not Detected  Not Detected Final   • Protime 12/16/2019 22.9* 11.7 - 14.2 Seconds Final   • INR 12/16/2019 2.06* 0.90 - 1.10 Final   • Troponin T 12/16/2019 <0.010  0.000 - 0.030 ng/mL Final   • Protime 12/16/2019 22.4* 11.7 - 14.2 Seconds Final   • INR 12/16/2019 2.01* 0.90 - 1.10 Final   • Troponin T 12/16/2019 <0.010  0.000 - 0.030 ng/mL Final   • Troponin T 12/17/2019 <0.010   0.000 - 0.030 ng/mL Final   • Protime 12/17/2019 21.5* 11.7 - 14.2 Seconds Final   • INR 12/17/2019 1.91* 0.90 - 1.10 Final   • Glucose 12/17/2019 174* 65 - 99 mg/dL Final   • BUN 12/17/2019 17  8 - 23 mg/dL Final   • Creatinine 12/17/2019 0.78  0.57 - 1.00 mg/dL Final   • Sodium 12/17/2019 144  136 - 145 mmol/L Final   • Potassium 12/17/2019 3.4* 3.5 - 5.2 mmol/L Final   • Chloride 12/17/2019 103  98 - 107 mmol/L Final   • CO2 12/17/2019 28.2  22.0 - 29.0 mmol/L Final   • Calcium 12/17/2019 8.5* 8.6 - 10.5 mg/dL Final   • eGFR Non African Amer 12/17/2019 70  >60 mL/min/1.73 Final   • BUN/Creatinine Ratio 12/17/2019 21.8  7.0 - 25.0 Final   • Anion Gap 12/17/2019 12.8  5.0 - 15.0 mmol/L Final   • Total Cholesterol 12/17/2019 159  0 - 200 mg/dL Final   • Triglycerides 12/17/2019 76  0 - 150 mg/dL Final   • HDL Cholesterol 12/17/2019 58  40 - 60 mg/dL Final   • LDL Cholesterol  12/17/2019 86  0 - 100 mg/dL Final   • VLDL Cholesterol 12/17/2019 15.2  5 - 40 mg/dL Final   • LDL/HDL Ratio 12/17/2019 1.48   Final   • Hemoglobin A1C 12/17/2019 6.30* 4.80 - 5.60 % Final   • WBC 12/17/2019 2.98* 3.40 - 10.80 10*3/mm3 Final   • RBC 12/17/2019 3.06* 3.77 - 5.28 10*6/mm3 Final   • Hemoglobin 12/17/2019 10.1* 12.0 - 15.9 g/dL Final   • Hematocrit 12/17/2019 29.2* 34.0 - 46.6 % Final   • MCV 12/17/2019 95.4  79.0 - 97.0 fL Final   • MCH 12/17/2019 33.0  26.6 - 33.0 pg Final   • MCHC 12/17/2019 34.6  31.5 - 35.7 g/dL Final   • RDW 12/17/2019 15.1  12.3 - 15.4 % Final   • RDW-SD 12/17/2019 52.2  37.0 - 54.0 fl Final   • MPV 12/17/2019 10.1  6.0 - 12.0 fL Final   • Platelets 12/17/2019 193  140 - 450 10*3/mm3 Final   • Neutrophil % 12/17/2019 78.9* 42.7 - 76.0 % Final   • Lymphocyte % 12/17/2019 18.1* 19.6 - 45.3 % Final   • Monocyte % 12/17/2019 2.7* 5.0 - 12.0 % Final   • Eosinophil % 12/17/2019 0.0* 0.3 - 6.2 % Final   • Basophil % 12/17/2019 0.0  0.0 - 1.5 % Final   • Immature Grans % 12/17/2019 0.3  0.0 - 0.5 % Final   •  Neutrophils, Absolute 12/17/2019 2.35  1.70 - 7.00 10*3/mm3 Final   • Lymphocytes, Absolute 12/17/2019 0.54* 0.70 - 3.10 10*3/mm3 Final   • Monocytes, Absolute 12/17/2019 0.08* 0.10 - 0.90 10*3/mm3 Final   • Eosinophils, Absolute 12/17/2019 0.00  0.00 - 0.40 10*3/mm3 Final   • Basophils, Absolute 12/17/2019 0.00  0.00 - 0.20 10*3/mm3 Final   • Immature Grans, Absolute 12/17/2019 0.01  0.00 - 0.05 10*3/mm3 Final   • nRBC 12/17/2019 0.0  0.0 - 0.2 /100 WBC Final   • Case Report 12/17/2019    Final                    Value:Non-gynecologic Cytology                          Case: [id number]                                  Authorizing Provider:  Rogelio Tucker MD            Collected:           12/17/2019 02:15 PM          Ordering Location:     [facility]  Received:            12/18/2019 07:40 AM                                 [facility] SU[facility]                                                                  Pathologist:           Omar Ni MD                                                         Specimen:    Bronchus, LUNG WASHINGS                                                                   • Final Diagnosis 12/17/2019    Final                    Value:This result contains rich text formatting which cannot be displayed here.   • Gross Description 12/17/2019    Final                    Value:This result contains rich text formatting which cannot be displayed here.   • Microscopic Description 12/17/2019    Final                    Value:This result contains rich text formatting which cannot be displayed here.   • AFB Stain 12/17/2019 No acid fast bacilli seen on concentrated smear   Preliminary   • Respiratory Culture 12/17/2019 Scant growth (1+) Pseudomonas species*  Preliminary   • Respiratory Culture 12/17/2019 Scant growth (1+) Normal Respiratory Nel   Preliminary   • Gram Stain 12/17/2019 Few (2+) WBCs seen   Preliminary   • Gram Stain 12/17/2019 Rare (1+) Gram negative bacilli    Preliminary   • Gram Stain 12/17/2019 No epithelial cells seen   Preliminary   • Color, Fluid 12/17/2019 Red   Final   • Appearance, Fluid 12/17/2019 Turbid* Clear Final   • WBC, Fluid 12/17/2019 890  /mm3 Final    Estimated count due to clots present in specimen.    • RBC, Fluid 12/17/2019 11,100  /mm3 Final    Estimated count due to clots present in specimen.    • Neutrophils, Fluid 12/17/2019 87  % Final   • Mononuclear, Fluid 12/17/2019 13  % Final   • Protime 12/18/2019 24.7* 11.7 - 14.2 Seconds Final   • INR 12/18/2019 2.27* 0.90 - 1.10 Final   • Glucose 12/18/2019 113* 65 - 99 mg/dL Final   • BUN 12/18/2019 20  8 - 23 mg/dL Final   • Creatinine 12/18/2019 0.74  0.57 - 1.00 mg/dL Final   • Sodium 12/18/2019 144  136 - 145 mmol/L Final   • Potassium 12/18/2019 3.3* 3.5 - 5.2 mmol/L Final   • Chloride 12/18/2019 105  98 - 107 mmol/L Final   • CO2 12/18/2019 25.9  22.0 - 29.0 mmol/L Final   • Calcium 12/18/2019 8.5* 8.6 - 10.5 mg/dL Final   • eGFR Non  Amer 12/18/2019 74  >60 mL/min/1.73 Final   • BUN/Creatinine Ratio 12/18/2019 27.0* 7.0 - 25.0 Final   • Anion Gap 12/18/2019 13.1  5.0 - 15.0 mmol/L Final   • WBC 12/18/2019 10.79  3.40 - 10.80 10*3/mm3 Final   • RBC 12/18/2019 2.97* 3.77 - 5.28 10*6/mm3 Final   • Hemoglobin 12/18/2019 9.4* 12.0 - 15.9 g/dL Final   • Hematocrit 12/18/2019 27.9* 34.0 - 46.6 % Final   • MCV 12/18/2019 93.9  79.0 - 97.0 fL Final   • MCH 12/18/2019 31.6  26.6 - 33.0 pg Final   • MCHC 12/18/2019 33.7  31.5 - 35.7 g/dL Final   • RDW 12/18/2019 14.9  12.3 - 15.4 % Final   • RDW-SD 12/18/2019 51.5  37.0 - 54.0 fl Final   • MPV 12/18/2019 10.4  6.0 - 12.0 fL Final   • Platelets 12/18/2019 200  140 - 450 10*3/mm3 Final   • Neutrophil % 12/18/2019 81.8* 42.7 - 76.0 % Final   • Lymphocyte % 12/18/2019 11.7* 19.6 - 45.3 % Final   • Monocyte % 12/18/2019 5.8  5.0 - 12.0 % Final   • Eosinophil % 12/18/2019 0.0* 0.3 - 6.2 % Final   • Basophil % 12/18/2019 0.1  0.0 - 1.5 % Final   •  Immature Grans % 12/18/2019 0.6* 0.0 - 0.5 % Final   • Neutrophils, Absolute 12/18/2019 8.82* 1.70 - 7.00 10*3/mm3 Final   • Lymphocytes, Absolute 12/18/2019 1.26  0.70 - 3.10 10*3/mm3 Final   • Monocytes, Absolute 12/18/2019 0.63  0.10 - 0.90 10*3/mm3 Final   • Eosinophils, Absolute 12/18/2019 0.00  0.00 - 0.40 10*3/mm3 Final   • Basophils, Absolute 12/18/2019 0.01  0.00 - 0.20 10*3/mm3 Final   • Immature Grans, Absolute 12/18/2019 0.07* 0.00 - 0.05 10*3/mm3 Final   • nRBC 12/18/2019 0.0  0.0 - 0.2 /100 WBC Final   Admission on 12/16/2019, Discharged on 12/16/2019   Component Date Value Ref Range Status   • Color 12/16/2019 Yellow  Yellow, Straw, Dark Yellow, Noemi Final   • Clarity, UA 12/16/2019 Slightly Cloudy* Clear Final   • Glucose, UA 12/16/2019 Negative  Negative, 1000 mg/dL (3+) mg/dL Final   • Bilirubin 12/16/2019 Negative  Negative Final   • Ketones, UA 12/16/2019 Negative  Negative Final   • Specific Gravity  12/16/2019 1.015  1.005 - 1.030 Final   • Blood, UA 12/16/2019 Trace* Negative Final   • pH, Urine 12/16/2019 7.0  5.0 - 8.0 Final   • Protein, POC 12/16/2019 Negative  Negative mg/dL Final   • Urobilinogen, UA 12/16/2019 Normal  Normal Final   • Nitrite, UA 12/16/2019 Negative  Negative Final   • Leukocytes 12/16/2019 Small (1+)* Negative Final   Anticoagulation Visit on 12/09/2019   Component Date Value Ref Range Status   • Protime 12/09/2019 24.7* 10.0 - 13.8 seconds Final   • INR 12/09/2019 2.1* 0.91 - 1.09 Final   Admission on 12/01/2019, Discharged on 12/04/2019   Component Date Value Ref Range Status   • Glucose 12/02/2019 131* 65 - 99 mg/dL Final   • BUN 12/02/2019 17  8 - 23 mg/dL Final   • Creatinine 12/02/2019 0.69  0.57 - 1.00 mg/dL Final   • Sodium 12/02/2019 144  136 - 145 mmol/L Final   • Potassium 12/02/2019 3.3* 3.5 - 5.2 mmol/L Final   • Chloride 12/02/2019 102  98 - 107 mmol/L Final   • CO2 12/02/2019 26.7  22.0 - 29.0 mmol/L Final   • Calcium 12/02/2019 9.2  8.6 - 10.5 mg/dL  Final   • Total Protein 12/02/2019 7.5  6.0 - 8.5 g/dL Final   • Albumin 12/02/2019 3.70  3.50 - 5.20 g/dL Final   • ALT (SGPT) 12/02/2019 40* 1 - 33 U/L Final   • AST (SGOT) 12/02/2019 42* 1 - 32 U/L Final    Specimen hemolyzed.  Results may be affected.   • Alkaline Phosphatase 12/02/2019 89  39 - 117 U/L Final   • Total Bilirubin 12/02/2019 0.4  0.2 - 1.2 mg/dL Final   • eGFR Non African Amer 12/02/2019 80  >60 mL/min/1.73 Final   • Globulin 12/02/2019 3.8  gm/dL Final   • A/G Ratio 12/02/2019 1.0  g/dL Final   • BUN/Creatinine Ratio 12/02/2019 24.6  7.0 - 25.0 Final   • Anion Gap 12/02/2019 15.3* 5.0 - 15.0 mmol/L Final   • Troponin T 12/02/2019 <0.010  0.000 - 0.030 ng/mL Final   • proBNP 12/02/2019 771.8  5.0-1,800.0 pg/mL Final   • Protime 12/02/2019 32.5* 11.7 - 14.2 Seconds Final   • INR 12/02/2019 3.20* 0.90 - 1.10 Final   • WBC 12/02/2019 8.87  3.40 - 10.80 10*3/mm3 Final   • RBC 12/02/2019 3.26* 3.77 - 5.28 10*6/mm3 Final   • Hemoglobin 12/02/2019 10.4* 12.0 - 15.9 g/dL Final   • Hematocrit 12/02/2019 31.6* 34.0 - 46.6 % Final   • MCV 12/02/2019 96.9  79.0 - 97.0 fL Final   • MCH 12/02/2019 31.9  26.6 - 33.0 pg Final   • MCHC 12/02/2019 32.9  31.5 - 35.7 g/dL Final   • RDW 12/02/2019 14.6  12.3 - 15.4 % Final   • RDW-SD 12/02/2019 51.5  37.0 - 54.0 fl Final   • MPV 12/02/2019 10.1  6.0 - 12.0 fL Final   • Platelets 12/02/2019 276  140 - 450 10*3/mm3 Final   • Neutrophil % 12/02/2019 69.1  42.7 - 76.0 % Final   • Lymphocyte % 12/02/2019 18.6* 19.6 - 45.3 % Final   • Monocyte % 12/02/2019 11.0  5.0 - 12.0 % Final   • Eosinophil % 12/02/2019 0.1* 0.3 - 6.2 % Final   • Basophil % 12/02/2019 0.5  0.0 - 1.5 % Final   • Immature Grans % 12/02/2019 0.7* 0.0 - 0.5 % Final   • Neutrophils, Absolute 12/02/2019 6.13  1.70 - 7.00 10*3/mm3 Final   • Lymphocytes, Absolute 12/02/2019 1.65  0.70 - 3.10 10*3/mm3 Final   • Monocytes, Absolute 12/02/2019 0.98* 0.10 - 0.90 10*3/mm3 Final   • Eosinophils, Absolute 12/02/2019  0.01  0.00 - 0.40 10*3/mm3 Final   • Basophils, Absolute 12/02/2019 0.04  0.00 - 0.20 10*3/mm3 Final   • Immature Grans, Absolute 12/02/2019 0.06* 0.00 - 0.05 10*3/mm3 Final   • nRBC 12/02/2019 0.1  0.0 - 0.2 /100 WBC Final   • Color, UA 12/02/2019 Yellow  Yellow, Straw Final   • Appearance, UA 12/02/2019 Clear  Clear Final   • pH, UA 12/02/2019 7.5  5.0 - 8.0 Final   • Specific Gravity, UA 12/02/2019 1.008  1.005 - 1.030 Final   • Glucose, UA 12/02/2019 Negative  Negative Final   • Ketones, UA 12/02/2019 Negative  Negative Final   • Bilirubin, UA 12/02/2019 Negative  Negative Final   • Blood, UA 12/02/2019 Small (1+)* Negative Final   • Protein, UA 12/02/2019 Negative  Negative Final   • Leuk Esterase, UA 12/02/2019 Moderate (2+)* Negative Final   • Nitrite, UA 12/02/2019 Negative  Negative Final   • Urobilinogen, UA 12/02/2019 0.2 E.U./dL  0.2 - 1.0 E.U./dL Final   • RBC, UA 12/02/2019 0-2  None Seen, 0-2 /HPF Final   • WBC, UA 12/02/2019 21-30* None Seen, 0-2 /HPF Final   • Bacteria, UA 12/02/2019 4+* None Seen /HPF Final   • Squamous Epithelial Cells, UA 12/02/2019 0-2  None Seen, 0-2 /HPF Final   • Hyaline Casts, UA 12/02/2019 0-2  None Seen /LPF Final   • Methodology 12/02/2019 Automated Microscopy   Final   • ADENOVIRUS, PCR 12/02/2019 Not Detected  Not Detected Final   • Coronavirus 229E 12/02/2019 Not Detected  Not Detected Final   • Coronavirus HKU1 12/02/2019 Not Detected  Not Detected Final   • Coronavirus NL63 12/02/2019 Not Detected  Not Detected Final   • Coronavirus OC43 12/02/2019 Not Detected  Not Detected Final   • Human Metapneumovirus 12/02/2019 Not Detected  Not Detected Final   • Human Rhinovirus/Enterovirus 12/02/2019 Not Detected  Not Detected Final   • Influenza B PCR 12/02/2019 Not Detected  Not Detected Final   • Parainfluenza Virus 1 12/02/2019 Not Detected  Not Detected Final   • Parainfluenza Virus 2 12/02/2019 Not Detected  Not Detected Final   • Parainfluenza Virus 3 12/02/2019 Not  Detected  Not Detected Final   • Parainfluenza Virus 4 12/02/2019 Not Detected  Not Detected Final   • Bordetella pertussis pcr 12/02/2019 Not Detected  Not Detected Final   • Influenza A H1 2009 PCR 12/02/2019 Not Detected  Not Detected Final   • Chlamydophila pneumoniae PCR 12/02/2019 Not Detected  Not Detected Final   • Mycoplasma pneumo by PCR 12/02/2019 Not Detected  Not Detected Final   • Influenza A PCR 12/02/2019 Not Detected  Not Detected Final   • Influenza A H3 12/02/2019 Not Detected  Not Detected Final   • Influenza A H1 12/02/2019 Not Detected  Not Detected Final   • RSV, PCR 12/02/2019 Not Detected  Not Detected Final   • Bordetella parapertussis PCR 12/02/2019 Not Detected  Not Detected Final   • Urine Culture 12/02/2019 >100,000 CFU/mL Proteus mirabilis*  Final   • Glucose 12/02/2019 201* 70 - 130 mg/dL Final   • Potassium 12/02/2019 4.1  3.5 - 5.2 mmol/L Final   • Protime 12/02/2019 32.5* 11.7 - 14.2 Seconds Final   • INR 12/02/2019 3.21* 0.90 - 1.10 Final   • Protime 12/03/2019 37.3* 11.7 - 14.2 Seconds Final   • INR 12/03/2019 3.81* 0.90 - 1.10 Final   • Glucose 12/03/2019 175* 70 - 130 mg/dL Final   • Glucose 12/03/2019 191* 70 - 130 mg/dL Final   • Protime 12/04/2019 41.5* 11.7 - 14.2 Seconds Final   • INR 12/04/2019 4.36* 0.90 - 1.10 Final   • Glucose 12/04/2019 161* 70 - 130 mg/dL Final   • Glucose 12/04/2019 165* 70 - 130 mg/dL Final

## 2019-12-20 ENCOUNTER — TELEPHONE (OUTPATIENT)
Dept: INTERNAL MEDICINE | Facility: CLINIC | Age: 84
End: 2019-12-20

## 2019-12-20 ENCOUNTER — READMISSION MANAGEMENT (OUTPATIENT)
Dept: CALL CENTER | Facility: HOSPITAL | Age: 84
End: 2019-12-20

## 2019-12-20 LAB
BACTERIA SPEC RESP CULT: ABNORMAL
BACTERIA SPEC RESP CULT: ABNORMAL
GRAM STN SPEC: ABNORMAL

## 2019-12-20 NOTE — TELEPHONE ENCOUNTER
LMA - patient notified that Dr. Rose would like for her to follow up with Dr. Tucker (pulmonary) for the results of her culture.

## 2019-12-20 NOTE — OUTREACH NOTE
COPD/PN Week 1 Survey      Responses   Facility patient discharged from?  [facility]   Does the patient have one of the following disease processes/diagnoses(primary or secondary)?  COPD/Pneumonia   Is there a successful TCM telephone encounter documented?  No   Was the primary reason for admission:  COPD exacerbation   Week 1 attempt successful?  No   Unsuccessful attempts  Attempt 1          Ana Don RN

## 2019-12-22 ENCOUNTER — READMISSION MANAGEMENT (OUTPATIENT)
Dept: CALL CENTER | Facility: HOSPITAL | Age: 84
End: 2019-12-22

## 2019-12-22 LAB
BACTERIA UR CULT: ABNORMAL
BACTERIA UR CULT: ABNORMAL
OTHER ANTIBIOTIC SUSC ISLT: ABNORMAL

## 2019-12-22 NOTE — OUTREACH NOTE
COPD/PN Week 1 Survey      Responses   Facility patient discharged from?  [facility]   Does the patient have one of the following disease processes/diagnoses(primary or secondary)?  COPD/Pneumonia   Is there a successful TCM telephone encounter documented?  No   Was the primary reason for admission:  COPD exacerbation   Week 1 attempt successful?  No   Unsuccessful attempts  Attempt 2          Mayra Colon RN

## 2019-12-23 ENCOUNTER — ANTICOAGULATION VISIT (OUTPATIENT)
Dept: PHARMACY | Facility: HOSPITAL | Age: 84
End: 2019-12-23

## 2019-12-23 DIAGNOSIS — I48.19 PERSISTENT ATRIAL FIBRILLATION (HCC): ICD-10-CM

## 2019-12-23 LAB
INR PPP: 4.4 (ref 0.91–1.09)
PROTHROMBIN TIME: 52.2 SECONDS (ref 10–13.8)

## 2019-12-23 PROCEDURE — 85610 PROTHROMBIN TIME: CPT

## 2019-12-23 PROCEDURE — 36416 COLLJ CAPILLARY BLOOD SPEC: CPT

## 2019-12-23 PROCEDURE — G0463 HOSPITAL OUTPT CLINIC VISIT: HCPCS

## 2019-12-23 NOTE — PROGRESS NOTES
Anticoagulation Clinic Progress Note    Anticoagulation Summary  As of 2019    INR goal:   2.0-3.0   TTR:   57.5 % (1.3 y)   INR used for dosin.4! (2019)   Warfarin maintenance plan:   4.5 mg every Mon, Wed, Fri; 3 mg all other days   Weekly warfarin total:   25.5 mg   Plan last modified:   Lev Mckeon [facility] (2019)   Next INR check:   2019   Priority:   Maintenance   Target end date:       Indications    Persistent atrial fibrillation [I48.19]             Anticoagulation Episode Summary     INR check location:       Preferred lab:       Send INR reminders to:    [facility] [place]    Comments:         Anticoagulation Care Providers     Provider Role Specialty Phone number    Marcie Robbins MD Referring Cardiology [phone]          Clinic Interview:      INR History:  Anticoagulation Monitoring 2019   INR 2.2 2.1 4.4   INR Date 2019   INR Goal 2.0-3.0 2.0-3.0 2.0-3.0   Trend Same Same Same   Last Week Total 25.5 mg 16 mg 25.5 mg   Next Week Total 25.5 mg 25.5 mg 19.5 mg   Sun 3 mg 3 mg -   Mon 4.5 mg 4.5 mg Hold ()   Tue 3 mg 3 mg 3 mg   Wed 4.5 mg 4.5 mg 3 mg ()   Thu 3 mg 3 mg 3 mg   Fri 4.5 mg 4.5 mg -   Sat 3 mg 3 mg -   Visit Report - - -   Some recent data might be hidden       Plan:  1. INR is Supratherapeutic today- see above in Anticoagulation Summary.  Will instruct Agnieszka Rizzo to Change their warfarin regimen this week- see above in Anticoagulation Summary.  2. Follow up in 4 days  3. Patient declines warfarin refills.  4. Patient has bruised/swollen area on bottom of foot that is not painful to touch but warm  Discoloration improved today and pt will check in with PCP.Verbal and written information provided. Patient expresses understanding and has no further questions at this time.    Lina Morris [facility]

## 2019-12-27 ENCOUNTER — ANTICOAGULATION VISIT (OUTPATIENT)
Dept: PHARMACY | Facility: HOSPITAL | Age: 84
End: 2019-12-27

## 2019-12-27 DIAGNOSIS — I48.19 PERSISTENT ATRIAL FIBRILLATION (HCC): ICD-10-CM

## 2019-12-27 LAB
INR PPP: 2.6 (ref 0.91–1.09)
PROTHROMBIN TIME: 31.4 SECONDS (ref 10–13.8)

## 2019-12-27 PROCEDURE — 36416 COLLJ CAPILLARY BLOOD SPEC: CPT

## 2019-12-27 PROCEDURE — 85610 PROTHROMBIN TIME: CPT

## 2019-12-27 NOTE — PROGRESS NOTES
Anticoagulation Clinic Progress Note    Anticoagulation Summary  As of 2019    INR goal:   2.0-3.0   TTR:   57.2 % (1.3 y)   INR used for dosin.6 (2019)   Warfarin maintenance plan:   4.5 mg every Mon, Wed, Fri; 3 mg all other days   Weekly warfarin total:   25.5 mg   No change documented:   Danielle Fierro   Plan last modified:   Lev Mckeon [facility] (2019)   Next INR check:   1/3/2020   Priority:   High   Target end date:       Indications    Persistent atrial fibrillation [I48.19]             Anticoagulation Episode Summary     INR check location:       Preferred lab:       Send INR reminders to:   [facility]IN[facility] [place]    Comments:         Anticoagulation Care Providers     Provider Role Specialty Phone number    Marcie Robbins MD Referring Cardiology [phone]          Clinic Interview:      INR History:  Anticoagulation Monitoring 2019   INR 2.1 4.4 2.6   INR Date 2019   INR Goal 2.0-3.0 2.0-3.0 2.0-3.0   Trend Same Same Same   Last Week Total 16 mg 25.5 mg 19.5 mg   Next Week Total 25.5 mg 19.5 mg 25.5 mg   Sun 3 mg - 3 mg   Mon 4.5 mg Hold () 4.5 mg   Tue 3 mg 3 mg 3 mg   Wed 4.5 mg 3 mg () 4.5 mg   Thu 3 mg 3 mg 3 mg   Fri 4.5 mg - 4.5 mg   Sat 3 mg - 3 mg   Visit Report - - -   Some recent data might be hidden       Plan:  1. INR is therapeutic today- see above in Anticoagulation Summary.   Will instruct Agnieszka Rizzo to continue their warfarin regimen- see above in Anticoagulation Summary.  2. Follow up in 1 week.  3. Patient declines warfarin refills.  4. Verbal and written information provided. Patient expresses understanding and has no further questions at this time.    Danielle Fierro

## 2019-12-30 ENCOUNTER — READMISSION MANAGEMENT (OUTPATIENT)
Dept: CALL CENTER | Facility: HOSPITAL | Age: 84
End: 2019-12-30

## 2019-12-30 NOTE — OUTREACH NOTE
COPD/PN Week 2 Survey      Responses   Facility patient discharged from?  [place]   Does the patient have one of the following disease processes/diagnoses(primary or secondary)?  COPD/Pneumonia   Was the primary reason for admission:  COPD exacerbation   Week 2 attempt successful?  Yes   Call start time  1154   Call end time  1157   Discharge diagnosis  Acute bronchitis due to parainfluenza virus, COPD with acute exacerbation, Chronic diastolic CHF (congestive heart failure   Is patient permission given to speak with other caregiver?  Yes   List who call center can speak with  Anyone    Meds reviewed with patient/caregiver?  Yes   Is the patient having any side effects they believe may be caused by any medication additions or changes?  No   Does the patient have all medications ordered at discharge?  Yes   Is the patient taking all medications as directed (includes completed medication regime)?  Yes   Does the patient have a primary care provider?   Yes   Does the patient have an appointment with their PCP or pulmonologist within 7 days of discharge?  Yes   Has the patient kept scheduled appointments due by today?  Yes   Psychosocial issues?  No   Did the patient receive a copy of their discharge instructions?  Yes   Nursing interventions  Reviewed instructions with patient, Educated on MyChart   What is the patient's perception of their health status since discharge?  Improving   Nursing Interventions  Nurse provided patient education   Are the patient's immunizations up to date?   Yes   Nursing interventions  Educated on importance of maintaining up to date immunizations as advised by provider   Is the patient/caregiver able to teach back the hierarchy of who to call/visit for symptoms/problems? PCP, Specialist, Home health nurse, Urgent Care, ED, 911  Yes   Is the patient able to teach back COPD zones?  Yes   Nursing interventions  Education provided on various zones   Patient reports what zone on this call?   Green Zone   Green Zone  Reports doing well, Breathing without shortness of breath, Usual activity and exercise level, Usual amount of phlegm/mucus without difficulty coughing up, Sleeping well, Appetite is good   Green [facility] interventions:  Take daily medications, Continue regular exercise/diet plan, Avoid indoor/outdoor triggers   Week 2 call completed?  Yes          Mayra Colon RN

## 2019-12-30 NOTE — PROGRESS NOTES
I called and spoke with pt and she is NOT taking the antibiotic and yes she did follow up with dr. Cadet.  Her appt is feb 2.  She is doing great and said thank you for checking up on her.

## 2020-01-06 ENCOUNTER — ANTICOAGULATION VISIT (OUTPATIENT)
Dept: PHARMACY | Facility: HOSPITAL | Age: 85
End: 2020-01-06

## 2020-01-06 DIAGNOSIS — I48.19 PERSISTENT ATRIAL FIBRILLATION (HCC): ICD-10-CM

## 2020-01-06 LAB
INR PPP: 2.8 (ref 0.91–1.09)
PROTHROMBIN TIME: 33.3 SECONDS (ref 10–13.8)

## 2020-01-06 PROCEDURE — 36416 COLLJ CAPILLARY BLOOD SPEC: CPT

## 2020-01-06 PROCEDURE — 85610 PROTHROMBIN TIME: CPT

## 2020-01-06 NOTE — PROGRESS NOTES
Anticoagulation Clinic Progress Note    Anticoagulation Summary  As of 2020    INR goal:   2.0-3.0   TTR:   58.2 % (1.3 y)   INR used for dosin.8 (2020)   Warfarin maintenance plan:   4.5 mg every Mon, Wed, Fri; 3 mg all other days   Weekly warfarin total:   25.5 mg   No change documented:   Josey Alonzo, Pharmacy Intern   Plan last modified:   Lev Mckeon [facility] (2019)   Next INR check:   2/3/2020   Priority:   High   Target end date:       Indications    Persistent atrial fibrillation [I48.19]             Anticoagulation Episode Summary     INR check location:       Preferred lab:       Send INR reminders to:    [facility]    Comments:         Anticoagulation Care Providers     Provider Role Specialty Phone number    Marcie Robbins MD Referring Cardiology [phone]          Clinic Interview:  Patient Findings     Positives:   Change in medications    Negatives:   Signs/symptoms of thrombosis, Signs/symptoms of bleeding,   Laboratory test error suspected, Change in health, Change in alcohol use,   Change in activity, Upcoming invasive procedure, Emergency department   visit, Upcoming dental procedure, Missed doses, Extra doses, Change in   diet/appetite, Hospital admission, Bruising, Other complaints    Comments:   Pt starting antibiotic through nebulizer this week, but not   sure which antibiotic. Pt said she will call clinic when she finds out.      Clinical Outcomes     Negatives:   Major bleeding event, Thromboembolic event,   Anticoagulation-related hospital admission, Anticoagulation-related ED   visit, Anticoagulation-related fatality    Comments:   Pt starting antibiotic through nebulizer this week, but not   sure which antibiotic. Pt said she will call clinic when she finds out.        INR History:  Anticoagulation Monitoring 2019   INR 4.4 2.6 2.8   INR Date 2019   INR Goal 2.0-3.0 2.0-3.0 2.0-3.0   Trend  Same Same Same   Last Week Total 25.5 mg 19.5 mg 25.5 mg   Next Week Total 19.5 mg 25.5 mg 25.5 mg   Sun - 3 mg 3 mg   Mon Hold (12/23) 4.5 mg 4.5 mg   Tue 3 mg 3 mg 3 mg   Wed 3 mg (12/25) 4.5 mg 4.5 mg   Thu 3 mg 3 mg 3 mg   Fri - 4.5 mg 4.5 mg   Sat - 3 mg 3 mg   Visit Report - - -   Some recent data might be hidden       Plan:  1. INR is Therapeutic today- see above in Anticoagulation Summary.  Will instruct Agnieszka Rizzo to Continue their warfarin regimen- see above in Anticoagulation Summary.  2. Follow up in 1 month  3. Patient declines warfarin refills.  4. Verbal and written information provided. Patient expresses understanding and has no further questions at this time.    Josey Alonzo, Pharmacy Intern

## 2020-01-06 NOTE — PROGRESS NOTES
I have supervised and reviewed the notes, assessments, and/or procedures performed by our PharmD Candidate. The documented assessment and plan were developed cooperatively. I concur with the documentation of this patient encounter.    Lina Morris RP

## 2020-01-07 ENCOUNTER — OFFICE VISIT (OUTPATIENT)
Dept: CARDIOLOGY | Facility: CLINIC | Age: 85
End: 2020-01-07

## 2020-01-07 VITALS
DIASTOLIC BLOOD PRESSURE: 64 MMHG | SYSTOLIC BLOOD PRESSURE: 122 MMHG | HEIGHT: 61 IN | HEART RATE: 78 BPM | BODY MASS INDEX: 24.35 KG/M2 | WEIGHT: 129 LBS | OXYGEN SATURATION: 96 %

## 2020-01-07 DIAGNOSIS — I48.19 PERSISTENT ATRIAL FIBRILLATION (HCC): Primary | ICD-10-CM

## 2020-01-07 DIAGNOSIS — I50.32 CHRONIC DIASTOLIC CHF (CONGESTIVE HEART FAILURE) (HCC): ICD-10-CM

## 2020-01-07 DIAGNOSIS — I10 BENIGN ESSENTIAL HYPERTENSION: ICD-10-CM

## 2020-01-07 DIAGNOSIS — I25.10 CHRONIC CORONARY ARTERY DISEASE: ICD-10-CM

## 2020-01-07 DIAGNOSIS — I47.20 VENTRICULAR TACHYCARDIA (HCC): ICD-10-CM

## 2020-01-07 PROCEDURE — 99214 OFFICE O/P EST MOD 30 MIN: CPT | Performed by: INTERNAL MEDICINE

## 2020-01-07 PROCEDURE — 93000 ELECTROCARDIOGRAM COMPLETE: CPT | Performed by: INTERNAL MEDICINE

## 2020-01-07 NOTE — PROGRESS NOTES
PATIENTINFORMATION    Date of Office Visit: 20  Encounter Provider: Marcie Robbins MD  Place of Service: [facility] CARDIOLOGY  Patient Name: Agnieszka Rizzo  : 1930    Subjective:         Patient ID: Agnieszka Rizzo is a 89 y.o. female.      History of Present Illness    This is a lady I met while she was hospitalized in 2016. She has a significant pulmonary history and was having a bronchoscopy and unfortunately developed a pneumothorax. She was found to be in rate -controlled atrial fibrillation. She had previously been seen by Dr. Ana Goodrich for history of hypertension, hyperlipidemia, mild mitral valve prolapse and nonobstructive coronary artery disease diagnosed by heart catheterization in .      Echocardiogram November 15, 2016:  All left ventricular wall segments contract normally.  Left ventricular function is normal. Estimated EF = 58%.  Left atrial cavity size is moderately dilated.  Mild tricuspid valve regurgitation is present.  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 They felt she would do well on oral anticoagulation and I started her on Pradaxa. I did not do a stress test while she was hospitalized because she was wheezing and I did not fill comfortable giving her Lexiscan at that time and I did not when he is dobutamine because of her atrial fibrillation.       She was able to have a stress as an outpt on 16 and this was normal. She continued to complain of dyspnea on exertion and so I had her set up for a cardioversion. She had a couple of hospitalizations in the meantime for respiratory issues. While she was in the hospital in 2017, I attempted to cardiovert her. I shocked her 3 times that she did not remain in sinus rhythm. I spoke with her family and we decided to continue with rate control  and anticoagulation.      She was hospitalized in 02/2017. She had started Voriconazole for treatment of pulmonary aspergillosis by Dr. Tucker. She became very nauseous and sick, and threw up for an entire day. She came into the hospital confused and weak. Her sodium was at 109. She was found to have a left clavicle fracture from a fall. She was discharged to [facility], where she has been. Unfortunately, as a result of the treatment for the aspergillosis, she developed C. difficile and was rehospitalized for treatment of this in March 2017. As a part of his treatment she did receive IV fluids. She was discharged but then I readmitted her on March 22 for IV diuresis. She was volume overloaded and in diastolic heart failure. She was back in the hospital on April 2 with more C. difficile colitis. Again she was treated with IV fluids and became volume overloaded. I diuresed her in the hospital and she was only mildly volume overloaded at discharge.     She was hospitalized from May 31 of June 9 2017.  While there she had some rapid ventricular response due to her atrial fibrillation and being sick with community-acquired pneumonia.  Some changes were made to her medication but in the end she was discharged on verapamil 360 mg once a day and digoxin 0.125 mg a day.    She was hospitalized in December 2019 with a COPD exacerbation/viral bronchitis.  She has been doing well.  She is concerned about some abdominal distention which she feels like is bloating.  I reviewed a CT of the abdomen which did not show any ascites or edema.  I looked at a BNP from December her BNP was not elevated.  I think she is good increased abdominal fullness because of shrinking with age.  She used to be 5 foot 4 and is now 5 1.  She has no palpitations.  She denies shortness of breath, chest pain or fatigue.  She has started doing some exercising.       Review of Systems   Constitution: Positive for malaise/fatigue. Negative for fever, weight  "gain and weight loss.   HENT: Negative for ear pain, hearing loss, nosebleeds and sore throat.    Eyes: Negative for double vision, pain, vision loss in left eye and vision loss in right eye.   Cardiovascular:        See history of present illness.   Respiratory: Positive for cough. Negative for shortness of breath, sleep disturbances due to breathing, snoring and wheezing.    Endocrine: Negative for cold intolerance, heat intolerance and polyuria.   Skin: Negative for itching, poor wound healing and rash.   Musculoskeletal: Negative for joint pain, joint swelling and myalgias.   Gastrointestinal: Negative for abdominal pain, diarrhea, hematochezia, nausea and vomiting.   Genitourinary: Negative for hematuria and hesitancy.   Neurological: Negative for numbness, paresthesias and seizures.   Psychiatric/Behavioral: Negative for depression. The patient is not nervous/anxious.            ECG 12 Lead  Date/Time: 1/7/2020 2:39 PM  Performed by: Marcie Robbins MD  Authorized by: Marcie Robbins MD   Comparison: compared with previous ECG from 8/13/2019  Similar to previous ECG  Rhythm: sinus rhythm  BPM: 70  Conduction: conduction normal  ST Segments: ST segments normal  T Waves: T waves normal    Clinical impression: normal ECG               Objective:     /64 (BP Location: Left arm)   Pulse 78   Ht 155.4 cm (61.2\")   Wt 58.5 kg (129 lb)   SpO2 96%   BMI 24.22 kg/m²  Body mass index is 24.22 kg/m².     Physical Exam   Constitutional: She appears well-developed.   HENT:   Head: Normocephalic and atraumatic.   Eyes: Pupils are equal, round, and reactive to light. Conjunctivae and lids are normal. Lids are everted and swept, no foreign bodies found.   Neck: Normal range of motion. No JVD present. Carotid bruit is not present. No tracheal deviation present. No thyroid mass present.   Cardiovascular: Normal rate, regular rhythm and normal heart sounds.   Pulses:       Dorsalis pedis pulses are 2+ on the right " side, and 2+ on the left side.   Pulmonary/Chest: Effort normal and breath sounds normal.   Abdominal: Normal appearance and bowel sounds are normal.   Musculoskeletal: Normal range of motion.   Neurological: She is alert. She has normal strength.   Skin: Skin is warm, dry and intact.   Psychiatric: She has a normal mood and affect. Her behavior is normal.   Vitals reviewed.      Lab Review: BNP December 2019 was normal      Assessment/Plan:       1. Atrial fibrillation. She failed cardioversion in January 2017. She has a CHADS2-Vasc score of 5. She is anticoagulated with warfarin.  She has mostly been in sinus rhythm.  She had a paroxysm of A. fib associated with upper respiratory tract infection.  • The patient's CHADS2-VASc score is 5    2. Chronic diastolic heart failure. She is on torsemide.      3. Dyspnea on exertion. Multifactorial.  Much improved.     4. Mitral valve prolapse with very mild mitral regurgitation.     5. Mild tricuspid regurgitation without pulmonary hypertension.     6. Nonobstructive coronary artery disease diagnosed by catheter in 2007. She has noted coronary artery calcification on CT scans. Nuclear stress 12/16 was normal.  he has had some chest discomfort so I am point to repeat the stress test.     7. Hypertension. Her blood pressure is controlled.      8. Hyperlipidemia. Zetia and atorvastatin.     9. Hyponatremia. Stable.     10. C. difficile diarrhea. 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  She has completed her antibiotic regimen.  She has a follow-up appointment with Dr. Tucker early next year     I think she looks great and reassured her that I do not think her abdominal distention is fluid overload.  She will keep her regularly scheduled follow-up in August 2020.       Orders Placed This Encounter   Procedures   • ECG 12 Lead     This order was created via procedure documentation        Discharge Medications            Accurate as of January 7, 2020  2:40 PM. If you have any questions, ask your nurse or doctor.               Continue These Medications      Instructions Start Date   albuterol sulfate  (90 Base) MCG/ACT inhaler  Commonly known as:  PROVENTIL HFA;VENTOLIN HFA;PROAIR HFA   2 puffs, Inhalation, Every 6 Hours PRN      atorvastatin 40 MG tablet  Commonly known as:  LIPITOR   40 mg, Oral, Daily      calcium carbonate 600 MG tablet  Commonly known as:  OS-EVIN   600 mg, Oral, Daily, With vitamin D      cetirizine 10 MG tablet  Commonly known as:  zyrTEC   10 mg, Oral, Daily      cholecalciferol 25 MCG (1000 UT) tablet  Commonly known as:  VITAMIN D3   1,000 Units, Oral, Daily      ezetimibe 10 MG tablet  Commonly known as:  ZETIA   10 mg, Oral, Daily      ferrous sulfate 325 (65 FE) MG tablet   325 mg, Oral, Daily With Breakfast      FLORAJEN ACIDOPHILUS capsule   1 tablet, Oral, Daily      fluticasone 50 MCG/ACT nasal spray  Commonly known as:  FLONASE   2 sprays, Nasal, Every Night at Bedtime      fluticasone-salmeterol 230-21 MCG/ACT inhaler  Commonly known as:  ADVAIR HFA   2 puffs, Inhalation, 2 Times Daily - RT      glucosamine-chondroitin 500-400 MG capsule capsule   1 capsule, Oral, Daily PRN      guaiFENesin 600 MG 12 hr tablet  Commonly known as:  MUCINEX   1,200 mg, Oral, Every 12 Hours Scheduled      ipratropium-albuterol 0.5-2.5 mg/3 ml nebulizer  Commonly known as:  DUO-NEB   3 mL, Nebulization, Every 4 Hours PRN      losartan 50 MG tablet  Commonly known as:  COZAAR   100 mg, Oral, Daily      potassium chloride 10 MEQ CR capsule  Commonly known as:  MICRO-K   10 mEq, Oral, Daily      sodium chloride 7 % nebulizer solution nebulizer solution   4 mL, Nebulization, 4 Times Daily - RT      torsemide 20 MG tablet  Commonly known as:  DEMADEX   20 mg, Oral, 2 Times Daily      verapamil  MG 24 hr capsule  Commonly known as:  VERELAN   360 mg, Oral, Nightly      VITAMIN B COMPLEX PO   1 tablet,  Oral, Daily      warfarin 3 MG tablet  Commonly known as:  COUMADIN   3 mg, Oral, See Admin Instructions, Every Sunday, Monday, Wednesday, Friday of each week      warfarin 3 MG tablet  Commonly known as:  COUMADIN   4.5 mg, Oral, See Admin Instructions, EVERY Tuesday, Thursday, AND Saturday OF EACH WEEK         Stop These Medications    PRESERVISION AREDS tablet  Stopped by:  MD Marcie Small MD  01/07/20  2:40 PM

## 2020-01-08 ENCOUNTER — READMISSION MANAGEMENT (OUTPATIENT)
Dept: CALL CENTER | Facility: HOSPITAL | Age: 85
End: 2020-01-08

## 2020-01-08 NOTE — OUTREACH NOTE
COPD/PN Week 3 Survey      Responses   Facility patient discharged from?  [place]   Does the patient have one of the following disease processes/diagnoses(primary or secondary)?  COPD/Pneumonia   Was the primary reason for admission:  COPD exacerbation   Week 3 attempt successful?  No   Unsuccessful attempts  Attempt 1          Homero Martines RN

## 2020-01-08 NOTE — OUTREACH NOTE
COPD/PN Week 3 Survey      Responses   Facility patient discharged from?  [place]   Does the patient have one of the following disease processes/diagnoses(primary or secondary)?  COPD/Pneumonia   Was the primary reason for admission:  COPD exacerbation          Taryn Dyer RN

## 2020-01-10 ENCOUNTER — READMISSION MANAGEMENT (OUTPATIENT)
Dept: CALL CENTER | Facility: HOSPITAL | Age: 85
End: 2020-01-10

## 2020-01-10 NOTE — OUTREACH NOTE
COPD/PN Week 3 Survey      Responses   Facility patient discharged from?  [place]   Does the patient have one of the following disease processes/diagnoses(primary or secondary)?  COPD/Pneumonia   Was the primary reason for admission:  COPD exacerbation   Week 3 attempt successful?  Yes   Call start time  932   Call end time  958   Discharge diagnosis  Acute bronchitis due to parainfluenza virus, COPD with acute exacerbation, Chronic diastolic CHF (congestive heart failure   Meds reviewed with patient/caregiver?  Yes   Is the patient taking all medications as directed (includes completed medication regime)?  Yes   Has the patient kept scheduled appointments due by today?  Yes   Psychosocial issues?  No   What is the patient's perception of their health status since discharge?  Improving [Pt still has a cough]   If the patient is a current smoker, are they able to teach back resources for cessation?  -- [Nonsmoker]   Is the patient able to teach back COPD zones?  No [Pt is confused becasue she's never been told she has COPD]   Nursing interventions  Education provided on various zones   Patient reports what zone on this call?  Green Zone   Green Zone  Reports doing well, Breathing without shortness of breath, Usual activity and exercise level, Usual amount of phlegm/mucus without difficulty coughing up, Sleeping well, Appetite is good   Green Zone interventions:  Use oxygen as prescribed, Take daily medications, Do not smoke, Continue regular exercise/diet plan, Avoid indoor/outdoor triggers   Week 3 call completed?  Yes   Wrap up additional comments  Pt's  of 63 years  2013          Nini Hahn RN

## 2020-01-20 ENCOUNTER — EPISODE CHANGES (OUTPATIENT)
Dept: CASE MANAGEMENT | Facility: OTHER | Age: 85
End: 2020-01-20

## 2020-01-20 ENCOUNTER — READMISSION MANAGEMENT (OUTPATIENT)
Dept: CALL CENTER | Facility: HOSPITAL | Age: 85
End: 2020-01-20

## 2020-01-20 LAB — FUNGUS WND CULT: ABNORMAL

## 2020-01-20 NOTE — OUTREACH NOTE
COPD/PN Week 4 Survey      Responses   Facility patient discharged from?  [place]   Does the patient have one of the following disease processes/diagnoses(primary or secondary)?  COPD/Pneumonia   Was the primary reason for admission:  COPD exacerbation   Week 4 attempt successful?  Yes   Call start time  1534   Call end time  1538   Discharge diagnosis  Acute bronchitis due to parainfluenza virus, COPD with acute exacerbation, Chronic diastolic CHF (congestive heart failure   Meds reviewed with patient/caregiver?  Yes   Is the patient taking all medications as directed (includes completed medication regime)?  Yes   Has the patient kept scheduled appointments due by today?  Yes   Is the patient still receiving Home Health Services?  N/A   Psychosocial issues?  No   What is the patient's perception of their health status since discharge?  Improving   Is the patient able to teach back COPD zones?  Yes   Nursing interventions  Education provided on various zones   Patient reports what zone on this call?  Green Zone   Green Zone  Reports doing well, Breathing without shortness of breath, Sleeping well, Appetite is good   Green Zone interventions:  Take daily medications, Do not smoke, Continue regular exercise/diet plan   Week 4 call completed?  Yes   Would the patient like one additional call?  No   Graduated  Yes   Did the patient feel the follow up calls were helpful during their recovery period?  Yes   Was the number of calls appropriate?  Yes          Nini Hahn RN

## 2020-01-28 LAB
MYCOBACTERIUM SPEC CULT: NORMAL
NIGHT BLUE STAIN TISS: NORMAL

## 2020-01-30 ENCOUNTER — ANTICOAGULATION VISIT (OUTPATIENT)
Dept: PHARMACY | Facility: HOSPITAL | Age: 85
End: 2020-01-30

## 2020-01-30 DIAGNOSIS — I48.19 PERSISTENT ATRIAL FIBRILLATION (HCC): ICD-10-CM

## 2020-01-30 NOTE — PROGRESS NOTES
Anticoagulation Clinic Progress Note    Anticoagulation Summary  As of 1/30/2020    INR goal:   2.0-3.0   TTR:   58.2 % (1.3 y)   INR used for dosing:   No new INR was available at the time of this encounter.   Warfarin maintenance plan:   4.5 mg every Mon, Wed, Fri; 3 mg all other days   Weekly warfarin total:   25.5 mg   No change documented:   Lev Mckeon RPH   Plan last modified:   Lev Mckeon RPH (11/4/2019)   Next INR check:   2/3/2020   Priority:   High   Target end date:       Indications    Persistent atrial fibrillation [I48.19]             Anticoagulation Episode Summary     INR check location:       Preferred lab:       Send INR reminders to:    [facility] [place]    Comments:         Anticoagulation Care Providers     Provider Role Specialty Phone number    Marcie Robbins MD Referring Cardiology [phone]            Clinic Interview:  Patient Findings     Positives:   Change in medications    Negatives:   Signs/symptoms of thrombosis, Signs/symptoms of bleeding,   Laboratory test error suspected, Change in health, Change in alcohol use,   Change in activity, Upcoming invasive procedure, Emergency department   visit, Upcoming dental procedure, Missed doses, Extra doses, Change in   diet/appetite, Hospital admission, Bruising, Other complaints    Comments:   Pt called to report new abx - tobramycin inhalation by   nebulizer x 4 wks started earlier this wk; starting another abx today (pt   not sure but possibly cephalexin?).      Clinical Outcomes     Negatives:   Major bleeding event, Thromboembolic event,   Anticoagulation-related hospital admission, Anticoagulation-related ED   visit, Anticoagulation-related fatality    Comments:   Pt called to report new abx - tobramycin inhalation by   nebulizer x 4 wks started earlier this wk; starting another abx today (pt   not sure but possibly cephalexin?).        INR History:  Anticoagulation Monitoring 12/27/2019 1/6/2020 1/30/2020   INR 2.6  2.8 -   INR Date 12/27/2019 1/6/2020 -   INR Goal 2.0-3.0 2.0-3.0 2.0-3.0   Trend Same Same Same   Last Week Total 19.5 mg 25.5 mg 25.5 mg   Next Week Total 25.5 mg 25.5 mg 25.5 mg   Sun 3 mg 3 mg 3 mg   Mon 4.5 mg 4.5 mg -   Tue 3 mg 3 mg -   Wed 4.5 mg 4.5 mg -   Thu 3 mg 3 mg 3 mg   Fri 4.5 mg 4.5 mg 4.5 mg   Sat 3 mg 3 mg 3 mg   Visit Report - - -   Some recent data might be hidden       Plan:  1. INR is unavailable today- see above in Anticoagulation Summary.   Will instruct Agnieszka Rizzo to Continue their warfarin regimen- see above in Anticoagulation Summary.  2. Follow up in 4 days in clinic as previously scheduled  3. Pt has agreed to only be called if INR out of range. They have been instructed to call if any changes in medications, doses, concerns, etc. Patient expresses understanding and has no further questions at this time.    Lev Mckeon [facility]

## 2020-02-03 ENCOUNTER — ANTICOAGULATION VISIT (OUTPATIENT)
Dept: PHARMACY | Facility: HOSPITAL | Age: 85
End: 2020-02-03

## 2020-02-03 DIAGNOSIS — I48.19 PERSISTENT ATRIAL FIBRILLATION (HCC): ICD-10-CM

## 2020-02-03 LAB
INR PPP: 1.4 (ref 0.91–1.09)
PROTHROMBIN TIME: 17.1 SECONDS (ref 10–13.8)

## 2020-02-03 PROCEDURE — 36416 COLLJ CAPILLARY BLOOD SPEC: CPT

## 2020-02-03 PROCEDURE — G0463 HOSPITAL OUTPT CLINIC VISIT: HCPCS

## 2020-02-03 PROCEDURE — 85610 PROTHROMBIN TIME: CPT

## 2020-02-03 NOTE — PROGRESS NOTES
Anticoagulation Clinic Progress Note    Anticoagulation Summary  As of 2/3/2020    INR goal:   2.0-3.0   TTR:   58.1 % (1.4 y)   INR used for dosin.4! (2/3/2020)   Warfarin maintenance plan:   4.5 mg every Mon, Wed, Fri; 3 mg all other days   Weekly warfarin total:   25.5 mg   Plan last modified:   Lev Mckeon RPH (2019)   Next INR check:   2020   Priority:   High   Target end date:       Indications    Persistent atrial fibrillation [I48.19]             Anticoagulation Episode Summary     INR check location:       Preferred lab:       Send INR reminders to:   [facility]    Comments:         Anticoagulation Care Providers     Provider Role Specialty Phone number    Marcie Robbins MD Referring Cardiology [phone]          Clinic Interview:  Patient Findings     Positives:   Change in medications, Change in diet/appetite    Negatives:   Signs/symptoms of thrombosis, Signs/symptoms of bleeding,   Laboratory test error suspected, Change in health, Change in alcohol use,   Change in activity, Upcoming invasive procedure, Emergency department   visit, Upcoming dental procedure, Missed doses, Extra doses, Hospital   admission, Bruising, Other complaints    Comments:   Started doxycycline on 1/29 x 7 days. Started Foodoro   Diet ~1.5 wks ago; increased vegetables (no high vit k ones)      Clinical Outcomes     Negatives:   Major bleeding event, Thromboembolic event,   Anticoagulation-related hospital admission, Anticoagulation-related ED   visit, Anticoagulation-related fatality    Comments:   Started doxycycline on 1/29 x 7 days. Started Foodoro   Diet ~1.5 wks ago; increased vegetables (no high vit k ones)        INR History:  Anticoagulation Monitoring 2020 2020 2/3/2020   INR 2.8 - 1.4   INR Date 2020 - 2/3/2020   INR Goal 2.0-3.0 2.0-3.0 2.0-3.0   Trend Same Same Same   Last Week Total 25.5 mg 25.5 mg 25.5 mg   Next Week Total 25.5 mg 25.5 mg 28.5 mg    Sun 3 mg 3 mg 3 mg   Mon 4.5 mg - 6 mg (2/3); Otherwise 4.5 mg   Tue 3 mg - 4.5 mg (2/4); Otherwise 3 mg   Wed 4.5 mg - 4.5 mg   Thu 3 mg 3 mg 3 mg   Fri 4.5 mg 4.5 mg 4.5 mg   Sat 3 mg 3 mg 3 mg   Visit Report - - -   Some recent data might be hidden       Plan:  1. INR is Subtherapeutic today- see above in Anticoagulation Summary.  Will instruct Agnieszka Rizzo to Change their warfarin regimen- see above in Anticoagulation Summary.  2. Follow up in 2 weeks  3. Patient declines warfarin refills.  4. Verbal and written information provided. Patient expresses understanding and has no further questions at this time.    Lev Mckeon [facility]

## 2020-02-11 ENCOUNTER — PATIENT OUTREACH (OUTPATIENT)
Dept: CASE MANAGEMENT | Facility: OTHER | Age: 85
End: 2020-02-11

## 2020-02-11 ENCOUNTER — HOSPITAL ENCOUNTER (EMERGENCY)
Facility: HOSPITAL | Age: 85
Discharge: HOME | End: 2020-02-11
Attending: EMERGENCY MEDICINE | Admitting: EMERGENCY MEDICINE

## 2020-02-11 ENCOUNTER — APPOINTMENT (OUTPATIENT)
Dept: GENERAL RADIOLOGY | Facility: HOSPITAL | Age: 85
End: 2020-02-11

## 2020-02-11 VITALS
BODY MASS INDEX: 22.26 KG/M2 | HEART RATE: 70 BPM | HEIGHT: 62 IN | SYSTOLIC BLOOD PRESSURE: 146 MMHG | RESPIRATION RATE: 18 BRPM | WEIGHT: 121 LBS | OXYGEN SATURATION: 98 % | TEMPERATURE: 98.5 F | DIASTOLIC BLOOD PRESSURE: 73 MMHG

## 2020-02-11 DIAGNOSIS — R06.02 SHORTNESS OF BREATH: Primary | ICD-10-CM

## 2020-02-11 DIAGNOSIS — J47.1 BRONCHIECTASIS WITH ACUTE EXACERBATION (HCC): ICD-10-CM

## 2020-02-11 LAB
ALBUMIN SERPL-MCNC: 4 G/DL (ref 3.5–5.2)
ALBUMIN/GLOB SERPL: 1.5 G/DL
ALP SERPL-CCNC: 80 U/L (ref 39–117)
ALT SERPL W P-5'-P-CCNC: 34 U/L (ref 1–33)
ANION GAP SERPL CALCULATED.3IONS-SCNC: 11.6 MMOL/L (ref 5–15)
AST SERPL-CCNC: 33 U/L (ref 1–32)
BASOPHILS # BLD AUTO: 0.07 10*3/MM3 (ref 0–0.2)
BASOPHILS NFR BLD AUTO: 0.8 % (ref 0–1.5)
BILIRUB SERPL-MCNC: 0.5 MG/DL (ref 0.2–1.2)
BUN BLD-MCNC: 12 MG/DL (ref 8–23)
BUN/CREAT SERPL: 14.1 (ref 7–25)
CALCIUM SPEC-SCNC: 10.1 MG/DL (ref 8.6–10.5)
CHLORIDE SERPL-SCNC: 98 MMOL/L (ref 98–107)
CO2 SERPL-SCNC: 30.4 MMOL/L (ref 22–29)
CREAT BLD-MCNC: 0.85 MG/DL (ref 0.57–1)
DEPRECATED RDW RBC AUTO: 51.2 FL (ref 37–54)
EOSINOPHIL # BLD AUTO: 0.25 10*3/MM3 (ref 0–0.4)
EOSINOPHIL NFR BLD AUTO: 3 % (ref 0.3–6.2)
ERYTHROCYTE [DISTWIDTH] IN BLOOD BY AUTOMATED COUNT: 14.4 % (ref 12.3–15.4)
GFR SERPL CREATININE-BSD FRML MDRD: 63 ML/MIN/1.73
GLOBULIN UR ELPH-MCNC: 2.6 GM/DL
GLUCOSE BLD-MCNC: 112 MG/DL (ref 65–99)
HCT VFR BLD AUTO: 35.5 % (ref 34–46.6)
HGB BLD-MCNC: 11.8 G/DL (ref 12–15.9)
HOLD SPECIMEN: NORMAL
HOLD SPECIMEN: NORMAL
IMM GRANULOCYTES # BLD AUTO: 0.03 10*3/MM3 (ref 0–0.05)
IMM GRANULOCYTES NFR BLD AUTO: 0.4 % (ref 0–0.5)
INR PPP: 2.1 (ref 0.9–1.1)
LYMPHOCYTES # BLD AUTO: 1.02 10*3/MM3 (ref 0.7–3.1)
LYMPHOCYTES NFR BLD AUTO: 12.1 % (ref 19.6–45.3)
MCH RBC QN AUTO: 32.4 PG (ref 26.6–33)
MCHC RBC AUTO-ENTMCNC: 33.2 G/DL (ref 31.5–35.7)
MCV RBC AUTO: 97.5 FL (ref 79–97)
MONOCYTES # BLD AUTO: 0.84 10*3/MM3 (ref 0.1–0.9)
MONOCYTES NFR BLD AUTO: 10 % (ref 5–12)
NEUTROPHILS # BLD AUTO: 6.22 10*3/MM3 (ref 1.7–7)
NEUTROPHILS NFR BLD AUTO: 73.7 % (ref 42.7–76)
NRBC BLD AUTO-RTO: 3.8 /100 WBC (ref 0–0.2)
NT-PROBNP SERPL-MCNC: 416.1 PG/ML (ref 5–1800)
PLAT MORPH BLD: NORMAL
PLATELET # BLD AUTO: 248 10*3/MM3 (ref 140–450)
PMV BLD AUTO: 10 FL (ref 6–12)
POTASSIUM BLD-SCNC: 3.5 MMOL/L (ref 3.5–5.2)
PROCALCITONIN SERPL-MCNC: 0.08 NG/ML (ref 0.1–0.25)
PROT SERPL-MCNC: 6.6 G/DL (ref 6–8.5)
PROTHROMBIN TIME: 23.2 SECONDS (ref 11.7–14.2)
RBC # BLD AUTO: 3.64 10*6/MM3 (ref 3.77–5.28)
ROULEAUX BLD QL SMEAR: NORMAL
SODIUM BLD-SCNC: 140 MMOL/L (ref 136–145)
TROPONIN T SERPL-MCNC: <0.01 NG/ML (ref 0–0.03)
WBC MORPH BLD: NORMAL
WBC NRBC COR # BLD: 8.43 10*3/MM3 (ref 3.4–10.8)
WHOLE BLOOD HOLD SPECIMEN: NORMAL
WHOLE BLOOD HOLD SPECIMEN: NORMAL

## 2020-02-11 PROCEDURE — 84484 ASSAY OF TROPONIN QUANT: CPT

## 2020-02-11 PROCEDURE — 85025 COMPLETE CBC W/AUTO DIFF WBC: CPT

## 2020-02-11 PROCEDURE — 84145 PROCALCITONIN (PCT): CPT | Performed by: EMERGENCY MEDICINE

## 2020-02-11 PROCEDURE — 80053 COMPREHEN METABOLIC PANEL: CPT

## 2020-02-11 PROCEDURE — 93005 ELECTROCARDIOGRAM TRACING: CPT | Performed by: EMERGENCY MEDICINE

## 2020-02-11 PROCEDURE — 85007 BL SMEAR W/DIFF WBC COUNT: CPT

## 2020-02-11 PROCEDURE — 83880 ASSAY OF NATRIURETIC PEPTIDE: CPT

## 2020-02-11 PROCEDURE — 94799 UNLISTED PULMONARY SVC/PX: CPT

## 2020-02-11 PROCEDURE — 99285 EMERGENCY DEPT VISIT HI MDM: CPT

## 2020-02-11 PROCEDURE — 94640 AIRWAY INHALATION TREATMENT: CPT

## 2020-02-11 PROCEDURE — 36415 COLL VENOUS BLD VENIPUNCTURE: CPT

## 2020-02-11 PROCEDURE — 71046 X-RAY EXAM CHEST 2 VIEWS: CPT

## 2020-02-11 PROCEDURE — 93010 ELECTROCARDIOGRAM REPORT: CPT | Performed by: INTERNAL MEDICINE

## 2020-02-11 PROCEDURE — 85610 PROTHROMBIN TIME: CPT

## 2020-02-11 RX ORDER — DOXYCYCLINE 100 MG/1
100 CAPSULE ORAL ONCE
Status: COMPLETED | OUTPATIENT
Start: 2020-02-11 | End: 2020-02-11

## 2020-02-11 RX ORDER — SODIUM CHLORIDE 0.9 % (FLUSH) 0.9 %
10 SYRINGE (ML) INJECTION AS NEEDED
Status: DISCONTINUED | OUTPATIENT
Start: 2020-02-11 | End: 2020-02-11

## 2020-02-11 RX ORDER — DOXYCYCLINE 100 MG/1
100 CAPSULE ORAL 2 TIMES DAILY
Qty: 14 CAPSULE | Refills: 0 | Status: SHIPPED | OUTPATIENT
Start: 2020-02-11 | End: 2020-03-16

## 2020-02-11 RX ORDER — IPRATROPIUM BROMIDE AND ALBUTEROL SULFATE 2.5; .5 MG/3ML; MG/3ML
3 SOLUTION RESPIRATORY (INHALATION) ONCE
Status: COMPLETED | OUTPATIENT
Start: 2020-02-11 | End: 2020-02-11

## 2020-02-11 RX ADMIN — IPRATROPIUM BROMIDE AND ALBUTEROL SULFATE 3 ML: 2.5; .5 SOLUTION RESPIRATORY (INHALATION) at 15:35

## 2020-02-11 RX ADMIN — DOXYCYCLINE 100 MG: 100 CAPSULE ORAL at 17:38

## 2020-02-11 NOTE — ED TRIAGE NOTES
"Has been having trouble \"catching her breath\" since December.  Saw pulmonologist on the 6th.  Was sent home without meds.  Has been whz  "

## 2020-02-11 NOTE — ED PROVIDER NOTES
EMERGENCY DEPARTMENT ENCOUNTER    CHIEF COMPLAINT  Chief Complaint: SOA  History given by: Pt  History limited by: none  Room Number: 30/30  PMD: Matt Rose MD  Cardiology: Dr. Robbins  Pulmonology: Dr. Tucker    HPI:  Pt is a 89 y.o. female who presents complaining of intermittent SOA for the past couple of months that acutely worsened three days ago. Pt states her SOA is worse with exertion. She reports associated cough but denies fever, chills, new leg swelling, or chest pain. She reports a Hx of Asthma and Bronchiectasis and is followed by Pulmonology. She uses a nebulizer at home and is on 2L of oxygen at night. She saw her Pulmonologist 5 days ago. She Hx of Afib and is anticoagulated on Coumadin. She is followed by Dr. Robbins (Cardiology).    PAST MEDICAL HISTORY  Active Ambulatory Problems     Diagnosis Date Noted   • Benign essential hypertension 12/01/2016   • Chronic coronary artery disease 12/01/2016   • Hyperlipidemia 12/01/2016   • Mitral valve insufficiency 12/01/2016   • Ventricular premature beats 12/01/2016   • Ventricular tachycardia (CMS/[facility]) 12/01/2016   • Persistent atrial fibrillation 12/01/2016   • Acute respiratory failure with hypoxia (CMS/[facility]) 01/09/2017   • Acid-fast bacteria present 01/09/2017   • DNR (do not resuscitate) 03/12/2017   • Chronic diastolic CHF (congestive heart failure) (CMS/[facility]) 03/12/2017   • CHF (congestive heart failure) (CMS/[facility]) 03/22/2017   • Asymptomatic bacteriuria 04/03/2017   • Iron deficiency anemia 04/04/2017   • Bronchiectasis (CMS/[facility]) 04/17/2017   • Pneumonia of both lungs due to infectious organism 05/31/2017   • 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   • Atrial fibrillation with rapid ventricular response (CMS/[facility]) 06/09/2017   • Anxiety 06/20/2017   • Exposure to hepatitis A 04/20/2018   • Medicare annual wellness visit, subsequent 04/18/2019   • Abdominal pain 09/23/2019   • Herpes infection 11/19/2019   • Moderate asthma with  acute exacerbation 12/02/2019   • Bronchitis, acute, with bronchospasm 12/16/2019   • Abnormal EKG 12/16/2019   • Acute bronchitis due to parainfluenza virus 12/16/2019   • COPD with acute exacerbation  12/16/2019   • UTI (urinary tract infection) 12/19/2019     Resolved Ambulatory Problems     Diagnosis Date Noted   • Pneumothorax of right lung after biopsy 11/12/2016   • Pulmonary aspergillosis (CMS/[facility]) 11/16/2016   • Heart failure, diastolic, with acute decompensation (CMS/[facility]) 12/01/2016   • Pneumonia 01/01/2017   • Pneumonia with the fungal infection aspergillosis (CMS/[facility]) 01/06/2017   • Hyponatremia 02/08/2017   • C. difficile colitis 03/11/2017   • Sepsis (CMS/[facility]) 03/12/2017   • Pancolitis (CMS/[facility]) 04/02/2017   • Hypokalemia 04/04/2017     Past Medical History:   Diagnosis Date   • Aspergillus (CMS/[facility])    • Asthma    • Atrial fibrillation (CMS/HCC)    • Atrial flutter (CMS/HCC)    • C. 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[facility])    • Dyspnea on exertion    • Fall    • Hypertension    • Hypoxia    • Infectious viral hepatitis    • Left shoulder pain    • Leg swelling    • Lesion of lung    • Mild tricuspid regurgitation    • MR (mitral regurgitation)    • MVP (mitral valve prolapse)    • Permanent atrial fibrillation    • Pneumothorax    • SOB (shortness of breath)    • Wheeze        PAST SURGICAL HISTORY  Past Surgical History:   Procedure Laterality Date   • BRONCHOSCOPY N/A 11/12/2016    Procedure: BRONCHOSCOPY WITH FLUORO, BRUSHINGS, BAL, AND BIOPSIES;  Surgeon: Rogelio Tucker MD;  [facility];  Service:    • BRONCHOSCOPY Bilateral 6/3/2017    Procedure: BRONCHOSCOPY with BAL ;  Surgeon: Sung King MD;  Location: [facility];  Service:    • BRONCHOSCOPY N/A 12/17/2019    Procedure: BRONCHOSCOPY WITH WASHINGS;  Surgeon: Rogelio Tucker MD;  Location:   [facility];  Service: Pulmonary   • CATARACT EXTRACTION EXTRACAPSULAR W/ INTRAOCULAR LENS IMPLANTATION     • COLONOSCOPY      2013   • D&C WITH SUCTION     • HYSTERECTOMY     • KNEE ARTHROSCOPY Left        FAMILY HISTORY  Family History   Problem Relation Age of Onset   • Hypertension Mother    • Stroke Mother    • Hypertension Father    • Cancer Son    • Cancer Brother        SOCIAL HISTORY  Social History     Socioeconomic History   • Marital status:      Spouse name: Not on file   • Number of children: Not on file   • Years of education: Not on file   • Highest education level: Not on file   Tobacco Use   • Smoking status: Never Smoker   • Smokeless tobacco: Never Used   • Tobacco comment: caffiene daily   Substance and Sexual Activity   • Alcohol use: Yes     Alcohol/week: 2.0 standard drinks     Types: 1 Glasses of wine, 1 Shots of liquor per week     Comment: occasional   • Drug use: No   • Sexual activity: Yes     Partners: Male       ALLERGIES  Amlodipine besylate; Aspirin; Bactrim [sulfamethoxazole-trimethoprim]; Erythromycin; Levaquin [levofloxacin]; Macrobid [nitrofurantoin]; and Ramipril    REVIEW OF SYSTEMS  Review of Systems   Constitutional: Negative for fever.   HENT: Negative for sore throat.    Eyes: Negative.    Respiratory: Positive for cough and shortness of breath.    Cardiovascular: Negative for chest pain.   Gastrointestinal: Negative for abdominal pain, diarrhea and vomiting.   Genitourinary: Negative for dysuria.   Musculoskeletal: Negative for neck pain.   Skin: Negative for rash.   Allergic/Immunologic: Negative.    Neurological: Negative for weakness, numbness and headaches.   Hematological: Negative.    Psychiatric/Behavioral: Negative.    All other systems reviewed and are negative.      PHYSICAL EXAM  ED Triage Vitals   Temp Heart Rate Resp BP SpO2   02/11/20 1232 02/11/20 1232 02/11/20 1232 02/11/20 1356 02/11/20 1232   98.5 °F (36.9 °C) 87 18 136/76 94 %      Temp src  Heart Rate Source Patient Position BP Location FiO2 (%)   02/11/20 1232 02/11/20 1232 02/11/20 1356 02/11/20 1356 --   Tympanic Monitor Sitting Right arm        Physical Exam   Constitutional: She is oriented to person, place, and time. No distress.   HENT:   Head: Normocephalic and atraumatic.   Eyes: Pupils are equal, round, and reactive to light. EOM are normal.   Neck: Normal range of motion. Neck supple.   Cardiovascular: Normal rate, regular rhythm and normal heart sounds.   Pulmonary/Chest: Effort normal and breath sounds normal. No respiratory distress. She has no wheezes. She has no rales. She exhibits no tenderness.   Abdominal: Soft. There is no tenderness. There is no rebound and no guarding.   Musculoskeletal: Normal range of motion. She exhibits no edema (pedal) or tenderness (calf).   Neurological: She is alert and oriented to person, place, and time. She has normal sensation and normal strength.   Skin: Skin is warm and dry. No rash noted.   Psychiatric: Mood and affect normal.   Nursing note and vitals reviewed.      LAB RESULTS  Lab Results (last 24 hours)     Procedure Component Value Units Date/Time    CBC & Differential [[id number]] Collected:  02/11/20 1250    Specimen:  Blood Updated:  02/11/20 1442    Narrative:       The following orders were created for panel order CBC & Differential.  Procedure                               Abnormality         Status                     ---------                               -----------         ------                     CBC Auto Differential[[id number]]        Abnormal            Final result                 Please view results for these tests on the individual orders.    Comprehensive Metabolic Panel [[id number]]  (Abnormal) Collected:  02/11/20 1250    Specimen:  Blood Updated:  02/11/20 1337     Glucose 112 mg/dL      BUN 12 mg/dL      Creatinine 0.85 mg/dL      Sodium 140 mmol/L      Potassium 3.5 mmol/L      Chloride 98 mmol/L      CO2 30.4 mmol/L       Calcium 10.1 mg/dL      Total Protein 6.6 g/dL      Albumin 4.00 g/dL      ALT (SGPT) 34 U/L      AST (SGOT) 33 U/L      Alkaline Phosphatase 80 U/L      Total Bilirubin 0.5 mg/dL      eGFR Non African Amer 63 mL/min/1.73      Globulin 2.6 gm/dL      A/G Ratio 1.5 g/dL      BUN/Creatinine Ratio 14.1     Anion Gap 11.6 mmol/L     Narrative:       GFR Normal >60  Chronic Kidney Disease <60  Kidney Failure <15      BNP [[id number]]  (Normal) Collected:  02/11/20 1250    Specimen:  Blood Updated:  02/11/20 1331     proBNP 416.1 pg/mL     Narrative:       Among patients with dyspnea, NT-proBNP is highly sensitive for the detection of acute congestive heart failure. In addition NT-proBNP of <300 pg/ml effectively rules out acute congestive heart failure with 99% negative predictive value.    Results may be falsely decreased if patient taking Biotin.      Troponin [[id number]]  (Normal) Collected:  02/11/20 1250    Specimen:  Blood Updated:  02/11/20 1335     Troponin T <0.010 ng/mL     Narrative:       Troponin T Reference Range:  <= 0.03 ng/mL-   Negative for AMI  >0.03 ng/mL-     Abnormal for myocardial necrosis.  Clinicians would have to utilize clinical acumen, EKG, Troponin and serial changes to determine if it is an Acute Myocardial Infarction or myocardial injury due to an underlying chronic condition.       Results may be falsely decreased if patient taking Biotin.      CBC Auto Differential [[id number]]  (Abnormal) Collected:  02/11/20 1250    Specimen:  Blood Updated:  02/11/20 1442     WBC 8.43 10*3/mm3      RBC 3.64 10*6/mm3      Hemoglobin 11.8 g/dL      Hematocrit 35.5 %      MCV 97.5 fL      MCH 32.4 pg      MCHC 33.2 g/dL      RDW 14.4 %      RDW-SD 51.2 fl      MPV 10.0 fL      Platelets 248 10*3/mm3      Neutrophil % 73.7 %      Lymphocyte % 12.1 %      Monocyte % 10.0 %      Eosinophil % 3.0 %      Basophil % 0.8 %      Immature Grans % 0.4 %      Neutrophils, Absolute 6.22 10*3/mm3      Lymphocytes,  "Absolute 1.02 10*3/mm3      Monocytes, Absolute 0.84 10*3/mm3      Eosinophils, Absolute 0.25 10*3/mm3      Basophils, Absolute 0.07 10*3/mm3      Immature Grans, Absolute 0.03 10*3/mm3      nRBC 3.8 /100 WBC     Protime-INR [[id number]]  (Abnormal) Collected:  02/11/20 1250    Specimen:  Blood Updated:  02/11/20 1410     Protime 23.2 Seconds      INR 2.10    Scan Slide [[id number]] Collected:  02/11/20 1250    Specimen:  Blood Updated:  02/11/20 1442     Rouleaux Slight/1+     WBC Morphology Normal     Platelet Morphology Normal    Procalcitonin [[id number]]  (Abnormal) Collected:  02/11/20 1250    Specimen:  Blood Updated:  02/11/20 1549     Procalcitonin 0.08 ng/mL     Narrative:       As a Marker for Sepsis (Non-Neonates):   1. <0.5 ng/mL represents a low risk of severe sepsis and/or septic shock.  1. >2 ng/mL represents a high risk of severe sepsis and/or septic shock.    As a Marker for Lower Respiratory Tract Infections that require antibiotic therapy:  PCT on Admission     Antibiotic Therapy             6-12 Hrs later  > 0.5                Strongly Recommended            >0.25 - <0.5         Recommended  0.1 - 0.25           Discouraged                   Remeasure/reassess PCT  <0.1                 Strongly Discouraged          Remeasure/reassess PCT      As 28 day mortality risk marker: \"Change in Procalcitonin Result\" (> 80 % or <=80 %) if Day 0 (or Day 1) and Day 4 values are available. Refer to http://www.[employer]-pct-calculator.com/   Change in PCT <=80 %   A decrease of PCT levels below or equal to 80 % defines a positive change in PCT test result representing a higher risk for 28-day all-cause mortality of patients diagnosed with severe sepsis or septic shock.  Change in PCT > 80 %   A decrease of PCT levels of more than 80 % defines a negative change in PCT result representing a lower risk for 28-day all-cause mortality of patients diagnosed with severe sepsis or septic shock.                Results may " be falsely decreased if patient taking Biotin.           I ordered the above labs and reviewed the results    RADIOLOGY  XR Chest 2 View   Final Result   Atelectasis/infiltrate in the right middle lobe, clinical   correlation and follow-up recommended.       This report was finalized on 2/11/2020 1:07 PM by Dr. Steven Garza M.D.               I ordered the above noted radiological studies. Interpreted by radiologist. Discussed with radiologist (). Reviewed by me in PACS.       PROCEDURES  Procedures      PROGRESS AND CONSULTS  ED Course as of Feb 11 1809   Tue Feb 11, 2020   1510 Old records reviewed.  Patient was admitted here in December 2019 for acute bronchitis secondary to parainfluenza.  She also had a COPD exacerbation and A. fib with RVR.  Patient followed up with Dr. Robbins of cardiology last month for persistent A. fib.  and chronic diastolic CHF.  Patient had a stress test done in December 2018 which was considered a low risk study.    []   1522 EKG          EKG time: 1507  Rhythm/Rate: Sinus rhythm rate 69  P waves and NM: LAE, normal  QRS, axis: LVH, LAD  ST and T waves: Normal    Interpreted Contemporaneously by me, independently viewed  EKG is changed compared to prior EKG done 12/17/2019.  A. fib was present at that time.      [WH]   1706 Test results discussed with the patient and her family.  I also informed them of my discussion with Dr. Jackson and the plan to discharge her on antibiotics.  She was advised to follow-up with Dr. Tucker within the next 1 week.  Return precautions were discussed with the patient.  Patient is resting and breathing comfortably.  Oxygen saturation is 98% on room air.    []      ED Course User Index  [WH] Julian De Luna MD     1522 ordered duoneb breathing treatment.    1618 Discussed the pt's case and findings with Dr. Jackson (Pulmonology) who recommends doxycycline for 7 days and will f/u in office.    1627 Ordered PO doxycycline.     MEDICAL DECISION  MAKING  Results were reviewed/discussed with the patient and they were also made aware of online access. Pt also made aware that some labs, such as cultures, will not be resulted during ER visit and follow up with PMD is necessary.     MDM  Number of Diagnoses or Management Options  Bronchiectasis with acute exacerbation (CMS/HCC):   Shortness of breath:   Diagnosis management comments: Patient was breathing comfortably in the ER.  She was not hypoxic on room air.  Chest x-ray did show some atelectasis/infiltrate in the right middle lobe.  White blood cell count and procalcitonin were normal.  EKG did not show any new ischemic changes.  Troponin was negative.  Work-up did not show any signs of congestive heart failure or pleural effusions.  Patient was given nebulizer treatment and a dose of doxycycline.  Case was discussed with Dr. Jackson and he recommended treating the patient with a one-week course of doxycycline.  Patient was advised to follow-up with Dr. Tucker in the next 1 week.  Return precautions were discussed with the patient.       Amount and/or Complexity of Data Reviewed  Clinical lab tests: reviewed  Tests in the radiology section of CPT®: reviewed  Tests in the medicine section of CPT®: reviewed           DIAGNOSIS  Final diagnoses:   Shortness of breath   Bronchiectasis with acute exacerbation (CMS/HCC)       DISPOSITION  DISCHARGE    Patient discharged in stable condition.    Reviewed implications of results, diagnosis, meds, responsibility to follow up, warning signs and symptoms of possible worsening, potential complications and reasons to return to ER.    Patient/Family voiced understanding of above instructions.    Discussed plan for discharge, as there is no emergent indication for admission. Patient referred to primary care provider for BP management due to today's BP. Pt/family is agreeable and understands need for follow up and repeat testing.  Pt is aware that discharge does not mean that  nothing is wrong but it indicates no emergency is present that requires admission and they must continue care with follow-up as given below or physician of their choice.     FOLLOW-UP  Rogelio Tucker MD  [address] [place] [address]  [phone]    Call in 1 day           Medication List      New Prescriptions    doxycycline 100 MG capsule  Commonly known as:  MONODOX  Take 1 capsule by mouth 2 (Two) Times a Day.            Latest Documented Vital Signs:  As of 6:09 PM  BP- 146/73 HR- 70 Temp- 98.5 °F (36.9 °C) (Tympanic) O2 sat- 98%    --  Documentation assistance provided by danii Peres for Dr. De Luna.  Information recorded by the scribe was done at my direction and has been verified and validated by me.           Alireza Peres  02/11/20 [id number]       Julian De Luna MD  02/11/20 7811

## 2020-02-11 NOTE — PROGRESS NOTES
Entered room, identified self, explained role, and verified facesheet information.  Patient awake, alert, pleasant and appropriate with interview; patient's son, Handy, at bedside for support.  Patient reported current usage of 2L O2 per [facility], nebulizer usage as needed.  Confirmed Hx a-fib on coumadin with participation in coumadin clinic for INR assessment.  Patient confirmed discharge from [facility] in December from bronchitis 2/2 parainfluenza.  Per D/C Summary and SW/CM note, PT/OT signed off, no need for rehab services.  Patient indicated that she remains ambulatory without assistance, denies need for DME.  Patient confirmed current PCP Milton CLARK with appointment scheduled for March (likely earlier appointment post-ER discharge).  Patient denied need for PCP contact information, other services, or transitional visit at this time.  No further questions or concerns noted.  Alireza Schmidt RN

## 2020-02-11 NOTE — PROGRESS NOTES
Discussed case on the phone with Dr. De Luna.  Reviewed the patient's chart, labs and x-ray images.  X-ray images actually look better than the ones in December.  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  She does not meet criteria for hospital admission at this time.  I have recommended doxycycline 100 mg twice daily for 7 days and for her to follow-up in the office with Dr. Tucker within the next 7 days.

## 2020-02-11 NOTE — ED NOTES
Pt to ED with c/o SOA with ambulation this week, had recent xray here per Dr sun.      Marcie Benton, RN  02/11/20 [id number]

## 2020-02-11 NOTE — DISCHARGE INSTRUCTIONS
Take antibiotics as prescribed.  Use your albuterol nebulizer every 6 hours.  Follow-up with Dr. Tucker as soon as possible.  Return to the emergency department for worsening symptoms, fever, chills, chest pain, or other concern.

## 2020-02-11 NOTE — OUTREACH NOTE
Care Coordination Note    Communication with ED care manager update of the following:   Spoke with Silvino RUELAS in ED. [employer]CO [employer] patient in ED with SOB. Patient has two previous admission with Bronchitis and asthma in December. Patient set home on 12/16 from last admission with no PT or OT. Patient actively being evaluated by physician at this time. Please assess discharge needs and arrange transition visit if appropriate.     Giulia Saunders RN  Ambulatory     2/11/2020, 2:22 PM

## 2020-02-17 ENCOUNTER — PATIENT OUTREACH (OUTPATIENT)
Dept: CASE MANAGEMENT | Facility: OTHER | Age: 85
End: 2020-02-17

## 2020-02-17 ENCOUNTER — ANTICOAGULATION VISIT (OUTPATIENT)
Dept: PHARMACY | Facility: HOSPITAL | Age: 85
End: 2020-02-17

## 2020-02-17 DIAGNOSIS — I48.19 PERSISTENT ATRIAL FIBRILLATION (HCC): ICD-10-CM

## 2020-02-17 LAB
INR PPP: 2.4 (ref 0.91–1.09)
PROTHROMBIN TIME: 28.3 SECONDS (ref 10–13.8)

## 2020-02-17 PROCEDURE — 85610 PROTHROMBIN TIME: CPT

## 2020-02-17 PROCEDURE — 36416 COLLJ CAPILLARY BLOOD SPEC: CPT

## 2020-02-17 RX ORDER — TORSEMIDE 20 MG/1
20 TABLET ORAL 2 TIMES DAILY
Qty: 180 TABLET | Refills: 2 | Status: SHIPPED | OUTPATIENT
Start: 2020-02-17 | End: 2020-11-03

## 2020-02-17 NOTE — PROGRESS NOTES
Anticoagulation Clinic Progress Note    Anticoagulation Summary  As of 2020    INR goal:   2.0-3.0   TTR:   57.9 % (1.4 y)   INR used for dosin.4 (2020)   Warfarin maintenance plan:   4.5 mg every Mon, Wed, Fri; 3 mg all other days   Weekly warfarin total:   25.5 mg   No change documented:   Matt Fragoso, Pharmacy Intern   Plan last modified:   Lev Mckeon [facility] (2019)   Next INR check:   3/2/2020   Priority:   High   Target end date:       Indications    Persistent atrial fibrillation [I48.19]             Anticoagulation Episode Summary     INR check location:       Preferred lab:       Send INR reminders to:    [facility]    Comments:         Anticoagulation Care Providers     Provider Role Specialty Phone number    Marcie Robbins MD Referring Cardiology [phone]          Clinic Interview:  Patient Findings     Positives:   Change in medications, Change in diet/appetite, Bruising    Negatives:   Signs/symptoms of thrombosis, Signs/symptoms of bleeding,   Laboratory test error suspected, Change in health, Change in alcohol use,   Change in activity, Upcoming invasive procedure, Emergency department   visit, Upcoming dental procedure, Missed doses, Extra doses, Hospital   admission, Other complaints    Comments:   Pt has 2 more days left of doxycycline and has recently   started PreserVision (macular degeneration/bleeding behind eye). Pt states she will potentially increase Vit K soon. Pt also reports bruising and   swelling (has had for a while)      Clinical Outcomes     Negatives:   Major bleeding event, Thromboembolic event,   Anticoagulation-related hospital admission, Anticoagulation-related ED   visit, Anticoagulation-related fatality    Comments:   Pt has 2 more days left of doxycycline and has recently   started PreserVision (macular degeneration/bleeding behind eye). Pt states she will potentially increase Vit K soon. Pt also reports bruising and    swelling (has had for a while)        INR History:  Anticoagulation Monitoring 1/30/2020 2/3/2020 2/17/2020   INR - 1.4 2.4   INR Date - 2/3/2020 2/17/2020   INR Goal 2.0-3.0 2.0-3.0 2.0-3.0   Trend Same Same Same   Last Week Total 25.5 mg 25.5 mg 25.5 mg   Next Week Total 25.5 mg 28.5 mg 25.5 mg   Sun 3 mg 3 mg 3 mg   Mon - 6 mg (2/3); Otherwise 4.5 mg 4.5 mg   Tue - 4.5 mg (2/4); Otherwise 3 mg 3 mg   Wed - 4.5 mg 4.5 mg   Thu 3 mg 3 mg 3 mg   Fri 4.5 mg 4.5 mg 4.5 mg   Sat 3 mg 3 mg 3 mg   Visit Report - - -   Some recent data might be hidden       Plan:  1. INR is Therapeutic today- see above in Anticoagulation Summary.  Will instruct Agnieszka Rizzo to Continue their warfarin regimen- see above in Anticoagulation Summary.  2. Follow up in 2 weeks  3. Patient declines warfarin refills.  4. Verbal and written information provided. Patient expresses understanding and has no further questions at this time.    Matt Fragoso, Pharmacy Intern

## 2020-02-17 NOTE — PROGRESS NOTES
I have supervised and reviewed the notes, assessments, and/or procedures performed by our PharmD Candidate. The documented assessment and plan were developed cooperatively. I concur with the documentation of this patient encounter.    Lev Mckeon RP

## 2020-02-17 NOTE — OUTREACH NOTE
Care Coordination Assessment    Documented/Reviewed By:  Ria Hinton RN Date/time:  2/17/2020  3:49 PM   Assessment completed with:  patient  Enrolled in care management program:  No  Living arrangement:  alone  Support system:  family, friends  Type of residence:  private residence  Home care services:  No  Equipment used at home:  none  Bed or wheelchair confined:  No  Inadequate nutrition:  No  Medication adherence problem:  No  Experiencing side effects from current medications:  No  History of fall(s) in last 6 months:  No  Difficulty keeping appointments:  No  Family aware of the patient's advance care planning wishes:  Yes

## 2020-02-17 NOTE — OUTREACH NOTE
"Care Plan Note      Responses   Annual Wellness Visit:   Patient Has Completed   Specific Disease Process Teaching  Heart Failure, Hypertension, COPD   Other Patient Education/Resources   24/7 [facility] Nurse Call Line, Advanced Care Planning, Iain   24/7 Nurse Call Line Education Method  Verbal   ACP Education Method  -- [Completed and filed]   MyChart Education Method  Verbal [Declines]   Does patient have depression diagnosis?  No   Advanced Directives:  Patient Has   Ed Visits past 12 months:  1   Hospitalizations past 12 months  2 or 3   Medication Adherence  Medications understood        The main concerns and/or symptoms the patient would like to address are: Talked with patient. Discussed 2/11/20  ED visit regarding SOB and bronchiectasis. Patient states to be compliant with ED recommendations; taking Doxycyline as directed and states symptoms have improved. She has pulmonology appointment on 3/10/20. She states will contact for earlier appointment as needed. Confirms PCP appointment for 3/20/20. Patient lives alone; independent with ADL's; meal preparation; transportation and  ambulates without assistive device. She is compliant with medications; use of nebulizer 4 times per day; use of O2 at night;  medical appointments ; monitoring PT/INR; daily weight;  blood pressure and O2 SAT. She states to have exercised today at the \"Y\" at slow pace.     Education/instruction provided by Care Coordinator: Reviewed with patient AVS;  Education regarding COPD, HTN; CHF; ANTOINETTE diet; monitor for S/SX of bleeding due to Coumadin; discuss with physician regarding exercise recommendations;  24/7 Nurse Line Telephone number; ACM contact information; Advance Directives: My Chart; gaps in care; MWV and Case Management services. Patient verbalized understanding. She states to appreciate phone call and declines further outreach. No further questions or concerns voiced at this time.     Follow Up Outreach Due: Follow up " as needed.     Ria Hinton RN  Ambulatory     2/17/2020, 3:51 PM

## 2020-02-19 ENCOUNTER — TELEPHONE (OUTPATIENT)
Dept: SLEEP MEDICINE | Facility: HOSPITAL | Age: 85
End: 2020-02-19

## 2020-02-19 RX ORDER — VERAPAMIL HYDROCHLORIDE 360 MG/1
360 CAPSULE, DELAYED RELEASE PELLETS ORAL NIGHTLY
Qty: 90 CAPSULE | Refills: 3 | Status: SHIPPED | OUTPATIENT
Start: 2020-02-19 | End: 2021-03-03

## 2020-02-19 NOTE — TELEPHONE ENCOUNTER
----- Message from Hayden Landry MD sent at 2/19/2020 10:02 AM EST -----  Regarding: RE: can i give her chronic azithromycin  Yes, that's fine from my standpoint.     ----- Message -----  From: Arianna Loja APRN  Sent: 2/18/2020   3:42 PM EST  To: Hayden Landry MD  Subject: FW: can i give her chronic azithromycin          Dr. Landry, this patient has hx of KINA. She also has bronchiectasis. Dr. Tucker would like her to start Azithromycin 250daily to decrease exacerbations of bronchiectasis. She gets recurrent flare ups. Dr Robbins has no objection. She wanted us to check with you.    Would that be ok from your standpoint?    ----- Message -----  From: Rogelio Tucker MD  Sent: 2/17/2020  10:48 AM EST  To: PJ Herbert  Subject: FW: can i give her chronic azithromycin              ----- Message -----  From: Marcie Robbins MD  Sent: 2/6/2020  11:56 AM EST  To: Rogelio Tucker MD  Subject: RE: can i give her chronic azithromycin          Not from my standpoint but I would be sure to check with Dr. Landry.    Thanks,    JL    ----- Message -----  From: Rogelio Tucker MD  Sent: 2/6/2020   9:57 AM EST  To: Marcie Robbins MD  Subject: can i give her chronic azithromycin              Johnson Toro,    I would like to start her on Azithromycin 250 mg daily - for chronic treatment of bronchiectasis.    Any objections from [id number] standpoint ?    Rogelio

## 2020-03-02 ENCOUNTER — ANTICOAGULATION VISIT (OUTPATIENT)
Dept: PHARMACY | Facility: HOSPITAL | Age: 85
End: 2020-03-02

## 2020-03-02 DIAGNOSIS — I48.19 PERSISTENT ATRIAL FIBRILLATION (HCC): ICD-10-CM

## 2020-03-02 LAB
INR PPP: 2.1 (ref 0.91–1.09)
PROTHROMBIN TIME: 25 SECONDS (ref 10–13.8)

## 2020-03-02 PROCEDURE — 85610 PROTHROMBIN TIME: CPT

## 2020-03-02 PROCEDURE — 36416 COLLJ CAPILLARY BLOOD SPEC: CPT

## 2020-03-02 NOTE — PROGRESS NOTES
Anticoagulation Clinic Progress Note    Anticoagulation Summary  As of 3/2/2020    INR goal:   2.0-3.0   TTR:   59.0 % (1.4 y)   INR used for dosin.1 (3/2/2020)   Warfarin maintenance plan:   4.5 mg every Mon, Wed, Fri; 3 mg all other days   Weekly warfarin total:   25.5 mg   No change documented:   Reema Dorantes   Plan last modified:   Lev Mckeon RP (2019)   Next INR check:   3/16/2020   Priority:   High   Target end date:       Indications    Persistent atrial fibrillation [I48.19]             Anticoagulation Episode Summary     INR check location:       Preferred lab:       Send INR reminders to:    [facility] PO[facility]    Comments:         Anticoagulation Care Providers     Provider Role Specialty Phone number    Marcie Robbins MD Referring Cardiology [phone]          Clinic Interview:  Patient Findings     Negatives:   Signs/symptoms of thrombosis, Signs/symptoms of bleeding,   Laboratory test error suspected, Change in health, Change in alcohol use,   Change in activity, Upcoming invasive procedure, Emergency department   visit, Upcoming dental procedure, Missed doses, Extra doses, Change in   medications, Change in diet/appetite, Hospital admission, Bruising, Other   complaints      Clinical Outcomes     Negatives:   Major bleeding event, Thromboembolic event,   Anticoagulation-related hospital admission, Anticoagulation-related ED   visit, Anticoagulation-related fatality        INR History:  Anticoagulation Monitoring 2/3/2020 2020 3/2/2020   INR 1.4 2.4 2.1   INR Date 2/3/2020 2020 3/2/2020   INR Goal 2.0-3.0 2.0-3.0 2.0-3.0   Trend Same Same Same   Last Week Total 25.5 mg 25.5 mg 25.5 mg   Next Week Total 28.5 mg 25.5 mg 25.5 mg   Sun 3 mg 3 mg 3 mg   Mon 6 mg (2/3); Otherwise 4.5 mg 4.5 mg 4.5 mg   Tue 4.5 mg (); Otherwise 3 mg 3 mg 3 mg   Wed 4.5 mg 4.5 mg 4.5 mg   Thu 3 mg 3 mg 3 mg   Fri 4.5 mg 4.5 mg 4.5 mg   Sat 3 mg 3 mg 3 mg   Visit Report - - -    Some recent data might be hidden       Plan:  1. INR is therapeutic today- see above in Anticoagulation Summary.   Will instruct Agnieszka Rizzo to continue their warfarin regimen- see above in Anticoagulation Summary.  2. Follow up in 2 weeks.  3. Patient declines warfarin refills.  4. Verbal and written information provided. Patient expresses understanding and has no further questions at this time.    Reema Dorantes

## 2020-03-16 ENCOUNTER — HOSPITAL ENCOUNTER (OUTPATIENT)
Dept: CARDIOLOGY | Facility: HOSPITAL | Age: 85
Discharge: HOME | End: 2020-03-16
Admitting: INTERNAL MEDICINE

## 2020-03-16 ENCOUNTER — ANTICOAGULATION VISIT (OUTPATIENT)
Dept: PHARMACY | Facility: HOSPITAL | Age: 85
End: 2020-03-16

## 2020-03-16 ENCOUNTER — TRANSCRIBE ORDERS (OUTPATIENT)
Dept: ADMINISTRATIVE | Facility: HOSPITAL | Age: 85
End: 2020-03-16

## 2020-03-16 ENCOUNTER — APPOINTMENT (OUTPATIENT)
Dept: PHARMACY | Facility: HOSPITAL | Age: 85
End: 2020-03-16

## 2020-03-16 DIAGNOSIS — Z51.81 MEDICATION MONITORING ENCOUNTER: ICD-10-CM

## 2020-03-16 DIAGNOSIS — I48.19 PERSISTENT ATRIAL FIBRILLATION (HCC): ICD-10-CM

## 2020-03-16 DIAGNOSIS — Z51.81 MEDICATION MONITORING ENCOUNTER: Primary | ICD-10-CM

## 2020-03-16 LAB
INR PPP: 2.1 (ref 0.91–1.09)
PROTHROMBIN TIME: 25.7 SECONDS (ref 10–13.8)

## 2020-03-16 PROCEDURE — 85610 PROTHROMBIN TIME: CPT

## 2020-03-16 PROCEDURE — 93005 ELECTROCARDIOGRAM TRACING: CPT | Performed by: INTERNAL MEDICINE

## 2020-03-16 PROCEDURE — 93010 ELECTROCARDIOGRAM REPORT: CPT | Performed by: INTERNAL MEDICINE

## 2020-03-16 PROCEDURE — 36416 COLLJ CAPILLARY BLOOD SPEC: CPT

## 2020-03-16 RX ORDER — WARFARIN SODIUM 3 MG/1
TABLET ORAL
Qty: 110 TABLET | Refills: 1 | Status: SHIPPED | OUTPATIENT
Start: 2020-03-16 | End: 2020-06-05 | Stop reason: SDUPTHER

## 2020-03-16 NOTE — PROGRESS NOTES
Anticoagulation Clinic Progress Note    Anticoagulation Summary  As of 3/16/2020    INR goal:   2.0-3.0   TTR:   60.1 % (1.5 y)   INR used for dosin.1 (3/16/2020)   Warfarin maintenance plan:   4.5 mg every Mon, Wed, Fri; 3 mg all other days   Weekly warfarin total:   25.5 mg   No change documented:   Lev Mckeon RPH   Plan last modified:   Lev Mckeon RPH (2019)   Next INR check:   2020   Priority:   High   Target end date:       Indications    Persistent atrial fibrillation [I48.19]             Anticoagulation Episode Summary     INR check location:       Preferred lab:       Send INR reminders to:    [facility] PO[facility]    Comments:         Anticoagulation Care Providers     Provider Role Specialty Phone number    Marcie Robbins MD Referring Cardiology [phone]          Clinic Interview:  Patient Findings     Positives:   Change in medications, Other complaints    Negatives:   Signs/symptoms of thrombosis, Signs/symptoms of bleeding,   Laboratory test error suspected, Change in health, Change in alcohol use,   Change in activity, Upcoming invasive procedure, Emergency department   visit, Upcoming dental procedure, Missed doses, Extra doses, Change in   diet/appetite, Hospital admission, Bruising    Comments:   Reports taking azithromycin 250 mg daily since ; pt   reports it will is likely to be continued beyond 30 days of the original   prescription (3/21); however dependent on ekg.       Clinical Outcomes     Negatives:   Major bleeding event, Thromboembolic event,   Anticoagulation-related hospital admission, Anticoagulation-related ED   visit, Anticoagulation-related fatality    Comments:   Reports taking azithromycin 250 mg daily since ; pt   reports it will is likely to be continued beyond 30 days of the original   prescription (3/21); however dependent on ekg.         INR History:  Anticoagulation Monitoring 2020 3/2/2020 3/16/2020   INR 2.4 2.1 2.1    INR Date 2/17/2020 3/2/2020 3/16/2020   INR Goal 2.0-3.0 2.0-3.0 2.0-3.0   Trend Same Same Same   Last Week Total 25.5 mg 25.5 mg 25.5 mg   Next Week Total 25.5 mg 25.5 mg 25.5 mg   Sun 3 mg 3 mg 3 mg   Mon 4.5 mg 4.5 mg 4.5 mg   Tue 3 mg 3 mg 3 mg   Wed 4.5 mg 4.5 mg 4.5 mg   Thu 3 mg 3 mg 3 mg   Fri 4.5 mg 4.5 mg 4.5 mg   Sat 3 mg 3 mg 3 mg   Visit Report - - -   Some recent data might be hidden       Plan:  1. INR is Therapeutic today- see above in Anticoagulation Summary.  Will instruct Agnieszka Rizzo to Continue their warfarin regimen- see above in Anticoagulation Summary.  2. Follow up in 4 weeks; pt will call to schedule sooner if azithromycin is discontinued  3. Patient desires warfarin refills.  4. Verbal and written information provided. Patient expresses understanding and has no further questions at this time.    Lev Mckeon [facility]

## 2020-03-17 RX ORDER — FERROUS SULFATE 325(65) MG
325 TABLET ORAL
Qty: 90 TABLET | Refills: 1 | Status: SHIPPED | OUTPATIENT
Start: 2020-03-17 | End: 2020-09-16

## 2020-03-18 ENCOUNTER — TELEPHONE (OUTPATIENT)
Dept: SLEEP MEDICINE | Facility: HOSPITAL | Age: 85
End: 2020-03-18

## 2020-03-18 NOTE — TELEPHONE ENCOUNTER
----- Message from Marcie Robbins MD sent at 3/17/2020  3:48 PM EDT -----  Regarding: RE: Please review EKG  I looked at it and I compared it to prior.  I do not think it is significantly different.  If she is not having any symptoms, I do not think she will need anything different.    JL  ----- Message -----  From: Arianna Loja APRN  Sent: 3/17/2020   3:15 PM EDT  To: Marcie Robbins MD  Subject: Please review EKG                                Dr. Robbins, we ordered EKG to evaluate QTc on this patient as part of chronic Azithromycin therapy. Could you please take a look at the EKG readings from 3/16/20 It reports anteroseptal infarct-age indeterminate. Do you appreciate any new ischemic changes? Do you see a change from prior readings? Please let us know.    Thank you so much.    Arianna OLIVA

## 2020-03-26 RX ORDER — POTASSIUM CHLORIDE 750 MG/1
10 CAPSULE, EXTENDED RELEASE ORAL DAILY
Qty: 30 CAPSULE | Refills: 5 | Status: SHIPPED | OUTPATIENT
Start: 2020-03-26 | End: 2020-05-21 | Stop reason: SDUPTHER

## 2020-04-10 ENCOUNTER — TELEPHONE (OUTPATIENT)
Dept: PHARMACY | Facility: HOSPITAL | Age: 85
End: 2020-04-10

## 2020-04-10 DIAGNOSIS — I48.19 PERSISTENT ATRIAL FIBRILLATION (HCC): Primary | ICD-10-CM

## 2020-04-16 ENCOUNTER — TELEPHONE (OUTPATIENT)
Dept: PHARMACY | Facility: HOSPITAL | Age: 85
End: 2020-04-16

## 2020-04-16 ENCOUNTER — ANTICOAGULATION VISIT (OUTPATIENT)
Dept: PHARMACY | Facility: HOSPITAL | Age: 85
End: 2020-04-16

## 2020-04-16 DIAGNOSIS — I48.19 PERSISTENT ATRIAL FIBRILLATION (HCC): ICD-10-CM

## 2020-04-16 LAB — INR PPP: 1.6

## 2020-04-16 NOTE — PROGRESS NOTES
Anticoagulation Clinic Progress Note    Anticoagulation Summary  As of 2020    INR goal:   2.0-3.0   TTR:   57.9 % (1.6 y)   INR used for dosin.60! (2020)   Warfarin maintenance plan:   4.5 mg every Mon, Wed, Fri; 3 mg all other days   Weekly warfarin total:   25.5 mg   Plan last modified:   Lev Mckeon [facility] (2019)   Next INR check:   2020   Priority:   High   Target end date:       Indications    Persistent atrial fibrillation [I48.19]             Anticoagulation Episode Summary     INR check location:       Preferred lab:       Send INR reminders to:   [facility] PO[facility]    Comments:         Anticoagulation Care Providers     Provider Role Specialty Phone number    Marcie Robbins MD Referring Cardiology [phone]          Clinic Interview:  Patient Findings     Negatives:   Signs/symptoms of thrombosis, Signs/symptoms of bleeding,   Laboratory test error suspected, Change in health, Change in alcohol use,   Change in activity, Upcoming invasive procedure, Emergency department   visit, Upcoming dental procedure, Missed doses, Extra doses, Change in   medications, Change in diet/appetite, Hospital admission, Bruising, Other   complaints      Clinical Outcomes     Negatives:   Major bleeding event, Thromboembolic event,   Anticoagulation-related hospital admission, Anticoagulation-related ED   visit, Anticoagulation-related fatality        INR History:  Anticoagulation Monitoring 3/2/2020 3/16/2020 2020   INR 2.1 2.1 1.60   INR Date 3/2/2020 3/16/2020 2020   INR Goal 2.0-3.0 2.0-3.0 2.0-3.0   Trend Same Same Same   Last Week Total 25.5 mg 25.5 mg 25.5 mg   Next Week Total 25.5 mg 25.5 mg 27 mg   Sun 3 mg 3 mg 3 mg   Mon 4.5 mg 4.5 mg 4.5 mg   Tue 3 mg 3 mg 3 mg   Wed 4.5 mg 4.5 mg 4.5 mg   Thu 3 mg 3 mg 4.5 mg (); Otherwise 3 mg   Fri 4.5 mg 4.5 mg 4.5 mg   Sat 3 mg 3 mg 3 mg   Visit Report - - -   Some recent data might be hidden       Plan:  1. INR is  Subtherapeutic today- see above in Anticoagulation Summary.   Will instruct Agnieszka Rizzo to Change their warfarin regimen- see above in Anticoagulation Summary.  2. Follow up in 2 weeks w/ [facility] INR  3. They have been instructed to call if any changes in medications, doses, concerns, etc. Patient expresses understanding and has no further questions at this time.    Lev Mckeon, [facility]

## 2020-04-23 RX ORDER — ATORVASTATIN CALCIUM 40 MG/1
40 TABLET, FILM COATED ORAL DAILY
Qty: 90 TABLET | Refills: 1 | Status: SHIPPED | OUTPATIENT
Start: 2020-04-23 | End: 2020-11-03

## 2020-04-23 RX ORDER — EZETIMIBE 10 MG/1
10 TABLET ORAL DAILY
Qty: 90 TABLET | Refills: 1 | Status: SHIPPED | OUTPATIENT
Start: 2020-04-23 | End: 2020-11-10

## 2020-04-30 ENCOUNTER — TELEPHONE (OUTPATIENT)
Dept: PHARMACY | Facility: HOSPITAL | Age: 85
End: 2020-04-30

## 2020-04-30 ENCOUNTER — ANTICOAGULATION VISIT (OUTPATIENT)
Dept: PHARMACY | Facility: HOSPITAL | Age: 85
End: 2020-04-30

## 2020-04-30 DIAGNOSIS — I48.19 PERSISTENT ATRIAL FIBRILLATION (HCC): ICD-10-CM

## 2020-04-30 LAB — INR PPP: 1.4

## 2020-04-30 NOTE — PROGRESS NOTES
Anticoagulation Clinic Progress Note    Anticoagulation Summary  As of 2020    INR goal:   2.0-3.0   TTR:   56.6 % (1.6 y)   INR used for dosin.40! (2020)   Warfarin maintenance plan:   3 mg every Mon, Fri; 4.5 mg all other days   Weekly warfarin total:   28.5 mg   Plan last modified:   Lev Mckeon RPH (2020)   Next INR check:   5/15/2020   Priority:   High   Target end date:       Indications    Persistent atrial fibrillation [I48.19]             Anticoagulation Episode Summary     INR check location:       Preferred lab:       Send INR reminders to:   [facility] PO[facility]    Comments:         Anticoagulation Care Providers     Provider Role Specialty Phone number    Marcie Robbins MD Referring Cardiology [phone]          Clinic Interview:  Patient Findings     Positives:   Change in activity, Change in diet/appetite    Negatives:   Signs/symptoms of thrombosis, Signs/symptoms of bleeding,   Laboratory test error suspected, Change in health, Change in alcohol use,   Upcoming invasive procedure, Emergency department visit, Upcoming dental   procedure, Missed doses, Extra doses, Change in medications, Hospital   admission, Bruising, Other complaints    Comments:   Increased physical activity. Decreased vit k actually.      Clinical Outcomes     Negatives:   Major bleeding event, Thromboembolic event,   Anticoagulation-related hospital admission, Anticoagulation-related ED   visit, Anticoagulation-related fatality    Comments:   Increased physical activity. Decreased vit k actually.        INR History:  Anticoagulation Monitoring 3/16/2020 2020 2020   INR 2.1 1.60 1.40   INR Date 3/16/2020 2020 2020   INR Goal 2.0-3.0 2.0-3.0 2.0-3.0   Trend Same Same Up   Last Week Total 25.5 mg 25.5 mg 25.5 mg   Next Week Total 25.5 mg 27 mg 28.5 mg   Sun 3 mg 3 mg 4.5 mg   Mon 4.5 mg 4.5 mg 3 mg   Tue 3 mg 3 mg 4.5 mg   Wed 4.5 mg 4.5 mg 4.5 mg   Thu 3 mg 4.5 mg ();  Otherwise 3 mg 4.5 mg   Fri 4.5 mg 4.5 mg 3 mg   Sat 3 mg 3 mg 4.5 mg   Visit Report - - -   Some recent data might be hidden       Plan:  1. INR is Subtherapeutic today- see above in Anticoagulation Summary.   Will instruct Agnieszka Rizzo to Increase their warfarin regimen- see above in Anticoagulation Summary.  2. Follow up in 2 weeks w/ [facility] INR  3. They have been instructed to call if any changes in medications, doses, concerns, etc. Patient expresses understanding and has no further questions at this time.    Lev Mckeon, [facility]

## 2020-05-15 ENCOUNTER — ANTICOAGULATION VISIT (OUTPATIENT)
Dept: PHARMACY | Facility: HOSPITAL | Age: 85
End: 2020-05-15

## 2020-05-15 ENCOUNTER — TELEPHONE (OUTPATIENT)
Dept: PHARMACY | Facility: HOSPITAL | Age: 85
End: 2020-05-15

## 2020-05-15 DIAGNOSIS — I48.19 PERSISTENT ATRIAL FIBRILLATION (HCC): ICD-10-CM

## 2020-05-15 LAB — INR PPP: 2.1

## 2020-05-15 NOTE — PROGRESS NOTES
Anticoagulation Clinic Progress Note    Anticoagulation Summary  As of 5/15/2020    INR goal:   2.0-3.0   TTR:   55.5 % (1.6 y)   INR used for dosin.10 (5/15/2020)   Warfarin maintenance plan:   3 mg every Mon, Fri; 4.5 mg all other days   Weekly warfarin total:   28.5 mg   No change documented:   Lev Mckeon RPH   Plan last modified:   Lev Mckeon RPH (2020)   Next INR check:   2020   Priority:   High   Target end date:       Indications    Persistent atrial fibrillation [I48.19]             Anticoagulation Episode Summary     INR check location:       Preferred lab:       Send INR reminders to:    [facility] PO[facility]    Comments:         Anticoagulation Care Providers     Provider Role Specialty Phone number    Marcie Robbins MD Referring Cardiology [phone]          Clinic Interview:  Patient Findings     Negatives:   Signs/symptoms of thrombosis, Signs/symptoms of bleeding,   Laboratory test error suspected, Change in health, Change in alcohol use,   Change in activity, Upcoming invasive procedure, Emergency department   visit, Upcoming dental procedure, Missed doses, Extra doses, Change in   medications, Change in diet/appetite, Hospital admission, Bruising, Other   complaints      Clinical Outcomes     Negatives:   Major bleeding event, Thromboembolic event,   Anticoagulation-related hospital admission, Anticoagulation-related ED   visit, Anticoagulation-related fatality        INR History:  Anticoagulation Monitoring 2020 2020 5/15/2020   INR 1.60 1.40 2.10   INR Date 2020 2020 5/15/2020   INR Goal 2.0-3.0 2.0-3.0 2.0-3.0   Trend Same Up Same   Last Week Total 25.5 mg 25.5 mg 28.5 mg   Next Week Total 27 mg 28.5 mg 28.5 mg   Sun 3 mg 4.5 mg 4.5 mg   Mon 4.5 mg 3 mg 3 mg   Tue 3 mg 4.5 mg 4.5 mg   Wed 4.5 mg 4.5 mg 4.5 mg   Thu 4.5 mg (); Otherwise 3 mg 4.5 mg 4.5 mg   Fri 4.5 mg 3 mg 3 mg   Sat 3 mg 4.5 mg 4.5 mg   Visit Report - - -   Some recent  data might be hidden       Plan:  1. INR is Therapeutic today- see above in Anticoagulation Summary.   Will instruct Agnieszka Rizzo to Continue their warfarin regimen- see above in Anticoagulation Summary.  2. Follow up in 2 weeks in clinic  3. They have been instructed to call if any changes in medications, doses, concerns, etc. Patient expresses understanding and has no further questions at this time.    Lev Mckeon [facility]

## 2020-05-21 RX ORDER — POTASSIUM CHLORIDE 750 MG/1
10 CAPSULE, EXTENDED RELEASE ORAL DAILY
Qty: 90 CAPSULE | Refills: 3 | Status: SHIPPED | OUTPATIENT
Start: 2020-05-21 | End: 2021-05-19

## 2020-05-22 ENCOUNTER — TELEPHONE (OUTPATIENT)
Dept: PHARMACY | Facility: HOSPITAL | Age: 85
End: 2020-05-22

## 2020-05-28 ENCOUNTER — ANTICOAGULATION VISIT (OUTPATIENT)
Dept: PHARMACY | Facility: HOSPITAL | Age: 85
End: 2020-05-28

## 2020-05-28 ENCOUNTER — TELEPHONE (OUTPATIENT)
Dept: PHARMACY | Facility: HOSPITAL | Age: 85
End: 2020-05-28

## 2020-05-28 DIAGNOSIS — I48.19 PERSISTENT ATRIAL FIBRILLATION (HCC): ICD-10-CM

## 2020-05-28 LAB — INR PPP: 1.6

## 2020-05-29 NOTE — PROGRESS NOTES
"Anticoagulation Clinic Progress Note    Anticoagulation Summary  As of 2020    INR goal:   2.0-3.0   TTR:   54.7 % (1.7 y)   INR used for dosin.60! (2020)   Warfarin maintenance plan:   3 mg every Mon; 4.5 mg all other days   Weekly warfarin total:   30 mg   Plan last modified:   Lev Mckeon RPH (2020)   Next INR check:   2020   Priority:   High   Target end date:       Indications    Persistent atrial fibrillation [I48.19]             Anticoagulation Episode Summary     INR check location:       Preferred lab:       Send INR reminders to:   [facility] [place]    Comments:         Anticoagulation Care Providers     Provider Role Specialty Phone number    Marcie Robbins MD Referring Cardiology [phone]          Clinic Interview:  Patient Findings     Positives:   Other complaints    Negatives:   Signs/symptoms of thrombosis, Signs/symptoms of bleeding,   Laboratory test error suspected, Change in health, Change in alcohol use,   Change in activity, Upcoming invasive procedure, Emergency department   visit, Upcoming dental procedure, Missed doses, Extra doses, Change in   medications, Change in diet/appetite, Hospital admission, Bruising    Comments:   Reports \"bubble of blood\" in right eye, eye sight is   dramatically reduced; eval'd by Dr. Blankenship, ophthalmologist associated w/   [facility], yesterday (P: [phone] per pt) - received records, dx macular   degeneration, Dr. Blankenship recommended AREDS, but pt reports already taking. Received intravitreal injection yesterday (Avastin). Next appt w/ Dr. Blankenship 20.      Clinical Outcomes     Negatives:   Major bleeding event, Thromboembolic event,   Anticoagulation-related hospital admission, Anticoagulation-related ED   visit, Anticoagulation-related fatality    Comments:   Reports \"bubble of blood\" in right eye, eye sight is   dramatically reduced; eval'd by Dr. Blankenship, ophthalmologist associated w/   [facility], yesterday (P: [phone] " per pt) - received records, dx macular   degeneration, Dr. Blankenship recommended AREDS, but pt reports already taking. Received intravitreal injection yesterday (Avastin). Next appt w/ Dr. Blankenship 6/24/20.        INR History:  Anticoagulation Monitoring 4/30/2020 5/15/2020 5/28/2020   INR 1.40 2.10 1.60   INR Date 4/30/2020 5/15/2020 5/28/2020   INR Goal 2.0-3.0 2.0-3.0 2.0-3.0   Trend Up Same Up   Last Week Total 25.5 mg 28.5 mg 28.5 mg   Next Week Total 28.5 mg 28.5 mg 30 mg   Sun 4.5 mg 4.5 mg 4.5 mg   Mon 3 mg 3 mg 3 mg   Tue 4.5 mg 4.5 mg 4.5 mg   Wed 4.5 mg 4.5 mg 4.5 mg   Thu 4.5 mg 4.5 mg 4.5 mg   Fri 3 mg 3 mg 4.5 mg   Sat 4.5 mg 4.5 mg 4.5 mg   Visit Report - - -   Some recent data might be hidden       Plan:  1. INR is Subtherapeutic today- see above in Anticoagulation Summary.   Will instruct Agnieszka Rizzo to Change their warfarin regimen- see above in Anticoagulation Summary.  2. Follow up in 1 week  3. They have been instructed to call if any changes in medications, doses, concerns, etc. Patient expresses understanding and has no further questions at this time.    Lev Mckeon [facility]

## 2020-06-04 ENCOUNTER — APPOINTMENT (OUTPATIENT)
Dept: PHARMACY | Facility: HOSPITAL | Age: 85
End: 2020-06-04

## 2020-06-04 ENCOUNTER — EPISODE CHANGES (OUTPATIENT)
Dept: CASE MANAGEMENT | Facility: OTHER | Age: 85
End: 2020-06-04

## 2020-06-05 ENCOUNTER — ANTICOAGULATION VISIT (OUTPATIENT)
Dept: PHARMACY | Facility: HOSPITAL | Age: 85
End: 2020-06-05

## 2020-06-05 DIAGNOSIS — I48.19 PERSISTENT ATRIAL FIBRILLATION (HCC): ICD-10-CM

## 2020-06-05 LAB
INR PPP: 2.3 (ref 0.91–1.09)
PROTHROMBIN TIME: 27.7 SECONDS (ref 10–13.8)

## 2020-06-05 PROCEDURE — 85610 PROTHROMBIN TIME: CPT

## 2020-06-05 PROCEDURE — 36416 COLLJ CAPILLARY BLOOD SPEC: CPT

## 2020-06-05 RX ORDER — WARFARIN SODIUM 3 MG/1
TABLET ORAL
Qty: 130 TABLET | Refills: 1 | Status: SHIPPED | OUTPATIENT
Start: 2020-06-05 | End: 2020-09-16 | Stop reason: SDUPTHER

## 2020-06-05 NOTE — PROGRESS NOTES
Anticoagulation Clinic Progress Note    Anticoagulation Summary  As of 2020    INR goal:   2.0-3.0   TTR:   54.6 % (1.7 y)   INR used for dosin.3 (2020)   Warfarin maintenance plan:   3 mg every Mon; 4.5 mg all other days   Weekly warfarin total:   30 mg   No change documented:   Reema Dorantes   Plan last modified:   Lev Mckeon RPH (2020)   Next INR check:   2020   Priority:   High   Target end date:       Indications    Persistent atrial fibrillation [I48.19]             Anticoagulation Episode Summary     INR check location:       Preferred lab:       Send INR reminders to:   [facility]    Comments:         Anticoagulation Care Providers     Provider Role Specialty Phone number    Marcie Robbins MD Referring Cardiology [phone]          Clinic Interview:  Patient Findings     Negatives:   Signs/symptoms of thrombosis, Signs/symptoms of bleeding,   Laboratory test error suspected, Change in health, Change in alcohol use,   Change in activity, Upcoming invasive procedure, Emergency department   visit, Upcoming dental procedure, Missed doses, Extra doses, Change in   medications, Change in diet/appetite, Hospital admission, Bruising, Other   complaints      Clinical Outcomes     Negatives:   Major bleeding event, Thromboembolic event,   Anticoagulation-related hospital admission, Anticoagulation-related ED   visit, Anticoagulation-related fatality        INR History:  Anticoagulation Monitoring 5/15/2020 2020 2020   INR 2.10 1.60 2.3   INR Date 5/15/2020 2020 2020   INR Goal 2.0-3.0 2.0-3.0 2.0-3.0   Trend Same Up Same   Last Week Total 28.5 mg 28.5 mg 30 mg   Next Week Total 28.5 mg 30 mg 30 mg   Sun 4.5 mg 4.5 mg 4.5 mg   Mon 3 mg 3 mg 3 mg   Tue 4.5 mg 4.5 mg 4.5 mg   Wed 4.5 mg 4.5 mg 4.5 mg   Thu 4.5 mg 4.5 mg 4.5 mg   Fri 3 mg 4.5 mg 4.5 mg   Sat 4.5 mg 4.5 mg 4.5 mg   Visit Report - - -   Some recent data might be hidden       Plan:  1.  INR is therapeutic today- see above in Anticoagulation Summary.   Will instruct Agnieszka Rizzo to continue their warfarin regimen- see above in Anticoagulation Summary.  2. Follow up in 1 week.  3. Patient declines warfarin refills.  4. Verbal and written information provided. Patient expresses understanding and has no further questions at this time.    Reema Dorantes

## 2020-06-12 ENCOUNTER — PATIENT OUTREACH (OUTPATIENT)
Dept: CASE MANAGEMENT | Facility: OTHER | Age: 85
End: 2020-06-12

## 2020-06-12 ENCOUNTER — HOSPITAL ENCOUNTER (OUTPATIENT)
Dept: CARDIOLOGY | Facility: HOSPITAL | Age: 85
Discharge: HOME | End: 2020-06-12
Admitting: INTERNAL MEDICINE

## 2020-06-12 ENCOUNTER — TRANSCRIBE ORDERS (OUTPATIENT)
Dept: CARDIOLOGY | Facility: HOSPITAL | Age: 85
End: 2020-06-12

## 2020-06-12 ENCOUNTER — TRANSCRIBE ORDERS (OUTPATIENT)
Dept: ADMINISTRATIVE | Facility: HOSPITAL | Age: 85
End: 2020-06-12

## 2020-06-12 ENCOUNTER — ANTICOAGULATION VISIT (OUTPATIENT)
Dept: PHARMACY | Facility: HOSPITAL | Age: 85
End: 2020-06-12

## 2020-06-12 DIAGNOSIS — Z01.811 PRE-OP CHEST EXAM: Primary | ICD-10-CM

## 2020-06-12 DIAGNOSIS — I48.19 PERSISTENT ATRIAL FIBRILLATION (HCC): ICD-10-CM

## 2020-06-12 LAB
INR PPP: 2.1 (ref 0.91–1.09)
PROTHROMBIN TIME: 25.1 SECONDS (ref 10–13.8)

## 2020-06-12 PROCEDURE — 93005 ELECTROCARDIOGRAM TRACING: CPT

## 2020-06-12 PROCEDURE — 85610 PROTHROMBIN TIME: CPT

## 2020-06-12 PROCEDURE — 93010 ELECTROCARDIOGRAM REPORT: CPT | Performed by: INTERNAL MEDICINE

## 2020-06-12 PROCEDURE — 36416 COLLJ CAPILLARY BLOOD SPEC: CPT

## 2020-06-12 NOTE — OUTREACH NOTE
Patient Outreach Note  Talked with patient. Reviewed with patient Case Management services and role of Rn_ACM . She verbalized understanding. Patient requests call back as she is with family at this time. Will continue outreach.     Ria Hinton RN  Ambulatory     6/12/2020, 17:02

## 2020-06-12 NOTE — PROGRESS NOTES
Anticoagulation Clinic Progress Note    Anticoagulation Summary  As of 2020    INR goal:   2.0-3.0   TTR:   55.1 % (1.7 y)   INR used for dosin.1 (2020)   Warfarin maintenance plan:   3 mg every Mon; 4.5 mg all other days   Weekly warfarin total:   30 mg   No change documented:   Reema Dorantes   Plan last modified:   Lev Mckeon RPH (2020)   Next INR check:   2020   Priority:   High   Target end date:       Indications    Persistent atrial fibrillation (CMS/HCC) [I48.19]             Anticoagulation Episode Summary     INR check location:       Preferred lab:       Send INR reminders to:    [facility]    Comments:         Anticoagulation Care Providers     Provider Role Specialty Phone number    Marcie Robbins MD Referring Cardiology [phone]          Clinic Interview:  Patient Findings     Negatives:   Signs/symptoms of thrombosis, Signs/symptoms of bleeding,   Laboratory test error suspected, Change in health, Change in alcohol use,   Change in activity, Upcoming invasive procedure, Emergency department   visit, Upcoming dental procedure, Missed doses, Extra doses, Change in   medications, Change in diet/appetite, Hospital admission, Bruising, Other   complaints      Clinical Outcomes     Negatives:   Major bleeding event, Thromboembolic event,   Anticoagulation-related hospital admission, Anticoagulation-related ED   visit, Anticoagulation-related fatality        INR History:  Anticoagulation Monitoring 2020   INR 1.60 2.3 2.1   INR Date 2020   INR Goal 2.0-3.0 2.0-3.0 2.0-3.0   Trend Up Same Same   Last Week Total 28.5 mg 30 mg 30 mg   Next Week Total 30 mg 30 mg 30 mg   Sun 4.5 mg 4.5 mg 4.5 mg   Mon 3 mg 3 mg 3 mg   Tue 4.5 mg 4.5 mg 4.5 mg   Wed 4.5 mg 4.5 mg 4.5 mg   Thu 4.5 mg 4.5 mg 4.5 mg   Fri 4.5 mg 4.5 mg 4.5 mg   Sat 4.5 mg 4.5 mg 4.5 mg   Visit Report - - -   Some recent data might be hidden        Plan:  1. INR is therapeutic today- see above in Anticoagulation Summary.   Will instruct Agnieszka Rizzo to continue their warfarin regimen- see above in Anticoagulation Summary.  2. Follow up in 2 weeks.  3. Patient declines warfarin refills.  4. Verbal and written information provided. Patient expresses understanding and has no further questions at this time.    Reema Dorantes

## 2020-06-19 ENCOUNTER — PATIENT OUTREACH (OUTPATIENT)
Dept: CASE MANAGEMENT | Facility: OTHER | Age: 85
End: 2020-06-19

## 2020-06-19 NOTE — OUTREACH NOTE
"Care Plan Note      Responses   Lifestyle Goals  Routine follow-up with doctor(s), Self monitor blood pressure   Barriers  Disease education   Self Management  Use oxygen, Medication Adherence, Weight Monitoring, Home BP Monitoring   Specific Disease Process Teaching  Heart Failure, Hypertension, COPD   Other Patient Education/Resources   24/7 [facility] Nurse Call Line, Advanced Care Planning, [facility]   24/7 Nurse Call Line Education Method  Verbal   ACP Education Method  Verbal [Completed and filed]   MyChart Education Method  Verbal [Declines]   Advanced Directives:  Patient Has   Medication Adherence  Medications understood        The main concerns and/or symptoms the patient would like to address are: Talked with patient. Patient discusses her decrease in energy. She repots to have no difficulty with chest pain; SOB; appetite or sleeping. She sleeps with O2. Patient discusses her visits with Dr. Blankenship(ophthalmologist) regarding bleeding behind right eye. Confirms appointment with Dr. Blankenship on 6/24/20 and PCP appointment  6/30/20. She reports \"perfect\" vision with left eye.   Patient lives alone; independent with ADL's; meal preparation; transportation (short distances) and  ambulates without assistive device.  Patient is compliant with medications; medical appointments; monitoring PT/INR; daily weight;  blood pressure ;O2 SAT and PT/INR. Patient has supportive family who assist her as needed.     Education/instruction provided by Care Coordinator: Reviewed with patient education regarding HTN; DM COVID precautions; bleeding precautions; 24/7 Nurse Line Telephone number; ACM  contact information; Advance Directives; My Chart; gaps in care; MWV and Case Management services.Patient verbalized understanding. No further questions or concerns voiced at this time.     Follow Up Outreach Due: Follow up as needed.     Ria Hinton RN  Ambulatory     6/19/2020, 15:46    "

## 2020-06-26 ENCOUNTER — ANTICOAGULATION VISIT (OUTPATIENT)
Dept: PHARMACY | Facility: HOSPITAL | Age: 85
End: 2020-06-26

## 2020-06-26 DIAGNOSIS — I48.19 PERSISTENT ATRIAL FIBRILLATION (HCC): ICD-10-CM

## 2020-06-26 LAB
INR PPP: 1.7 (ref 0.91–1.09)
PROTHROMBIN TIME: 20.2 SECONDS (ref 10–13.8)

## 2020-06-26 PROCEDURE — G0463 HOSPITAL OUTPT CLINIC VISIT: HCPCS

## 2020-06-26 PROCEDURE — 85610 PROTHROMBIN TIME: CPT

## 2020-06-26 PROCEDURE — 36416 COLLJ CAPILLARY BLOOD SPEC: CPT

## 2020-06-26 NOTE — PROGRESS NOTES
Anticoagulation Clinic Progress Note    Anticoagulation Summary  As of 2020    INR goal:   2.0-3.0   TTR:   54.4 % (1.8 y)   INR used for dosin.7! (2020)   Warfarin maintenance plan:   4.5 mg every day   Weekly warfarin total:   31.5 mg   Plan last modified:   Lev Mckeon RPH (2020)   Next INR check:   2020   Priority:   High   Target end date:       Indications    Persistent atrial fibrillation (CMS/HCC) [I48.19]             Anticoagulation Episode Summary     INR check location:       Preferred lab:       [facility] [place]    Comments:         Anticoagulation Care Providers     Provider Role Specialty Phone number    Marcie Robbins MD Referring Cardiology [phone]          Clinic Interview:  Patient Findings     Positives:   Change in diet/appetite    Negatives:   Signs/symptoms of thrombosis, Signs/symptoms of bleeding,   Laboratory test error suspected, Change in health, Change in alcohol use,   Change in activity, Upcoming invasive procedure, Emergency department   visit, Upcoming dental procedure, Missed doses, Extra doses, Change in   medications, Hospital admission, Bruising, Other complaints    Comments:   Possibly more vit k (homemade vegetable soup).      Clinical Outcomes     Negatives:   Major bleeding event, Thromboembolic event,   Anticoagulation-related hospital admission, Anticoagulation-related ED   visit, Anticoagulation-related fatality    Comments:   Possibly more vit k (homemade vegetable soup).        INR History:  Anticoagulation Monitoring 2020   INR 2.3 2.1 1.7   INR Date 2020   INR Goal 2.0-3.0 2.0-3.0 2.0-3.0   Trend Same Same Up   Last Week Total 30 mg 30 mg 30 mg   Next Week Total 30 mg 30 mg 33 mg   Sun 4.5 mg 4.5 mg 4.5 mg   Mon 3 mg 3 mg 4.5 mg   Tue 4.5 mg 4.5 mg 4.5 mg   Wed 4.5 mg 4.5 mg 4.5 mg   Thu 4.5 mg 4.5 mg 4.5 mg   Fri 4.5 mg 4.5 mg 6 mg (); Otherwise  4.5 mg   Sat 4.5 mg 4.5 mg 4.5 mg   Visit Report - - -   Some recent data might be hidden       Plan:  1. INR is Subtherapeutic today- see above in Anticoagulation Summary.  Will instruct Agnieszka Rizzo to Increase their warfarin regimen- see above in Anticoagulation Summary.  2. Follow up in 1.5 weeks  3. Patient declines warfarin refills.  4. Verbal and written information provided. Patient expresses understanding and has no further questions at this time.    Lev Mckeon [facility]

## 2020-06-30 ENCOUNTER — OFFICE VISIT (OUTPATIENT)
Dept: INTERNAL MEDICINE | Facility: CLINIC | Age: 85
End: 2020-06-30

## 2020-06-30 VITALS
HEIGHT: 61 IN | DIASTOLIC BLOOD PRESSURE: 60 MMHG | OXYGEN SATURATION: 96 % | BODY MASS INDEX: 24.49 KG/M2 | SYSTOLIC BLOOD PRESSURE: 132 MMHG | WEIGHT: 129.7 LBS | HEART RATE: 68 BPM

## 2020-06-30 DIAGNOSIS — Z00.00 MEDICARE ANNUAL WELLNESS VISIT, SUBSEQUENT: Primary | ICD-10-CM

## 2020-06-30 DIAGNOSIS — I10 BENIGN ESSENTIAL HYPERTENSION: ICD-10-CM

## 2020-06-30 DIAGNOSIS — J47.1 BRONCHIECTASIS WITH ACUTE EXACERBATION (HCC): ICD-10-CM

## 2020-06-30 PROBLEM — J96.01 ACUTE RESPIRATORY FAILURE WITH HYPOXIA (HCC): Status: RESOLVED | Noted: 2017-01-09 | Resolved: 2020-06-30

## 2020-06-30 PROBLEM — N39.0 UTI (URINARY TRACT INFECTION): Status: RESOLVED | Noted: 2019-12-19 | Resolved: 2020-06-30

## 2020-06-30 PROBLEM — J18.9 PNEUMONIA OF BOTH LUNGS DUE TO INFECTIOUS ORGANISM: Status: RESOLVED | Noted: 2017-05-31 | Resolved: 2020-06-30

## 2020-06-30 PROCEDURE — G0439 PPPS, SUBSEQ VISIT: HCPCS | Performed by: FAMILY MEDICINE

## 2020-06-30 RX ORDER — AZITHROMYCIN 250 MG/1
250 TABLET, FILM COATED ORAL DAILY
COMMUNITY
Start: 2020-06-18 | End: 2020-09-16

## 2020-06-30 NOTE — PROGRESS NOTES
The ABCs of the Annual Wellness Visit  Subsequent Medicare Wellness Visit    Chief Complaint   Patient presents with   • Medicare Wellness-subsequent       Subjective   History of Present Illness:  Agnieszka Rizzo is a 89 y.o. female who presents for a Subsequent Medicare Wellness Visit.    HEALTH RISK ASSESSMENT    Recent Hospitalizations:  Recently treated at the following:  [facility]    Current Medical Providers:  Patient Care Team:  Matt Rose MD as PCP - General (Family Medicine)  Marcie Robbins MD as Consulting Physician (Cardiology)  Nilesh Danielle MD as Consulting Physician (Urology)  Lina Morris [facility] as Pharmacist  Lev Mckeon RP as Pharmacist (Pharmacy)  Ria Hinton RN as Ambulatory  (Population Health)    Smoking Status:  Social History     Tobacco Use   Smoking Status Never Smoker   Smokeless Tobacco Never Used   Tobacco Comment    caffiene daily       Alcohol Consumption:  Social History     Substance and Sexual Activity   Alcohol Use Yes   • Alcohol/week: 2.0 standard drinks   • Types: 1 Glasses of wine, 1 Shots of liquor per week    Comment: occasional       Depression Screen:   PHQ-2/PHQ-9 Depression Screening 6/30/2020   Little interest or pleasure in doing things 0   Feeling down, depressed, or hopeless 1   Trouble falling or staying asleep, or sleeping too much -   Feeling tired or having little energy -   Poor appetite or overeating -   Feeling bad about yourself - or that you are a failure or have let yourself or your family down -   Trouble concentrating on things, such as reading the newspaper or watching television -   Moving or speaking so slowly that other people could have noticed. Or the opposite - being so fidgety or restless that you have been moving around a lot more than usual -   Thoughts that you would be better off dead, or of hurting yourself in some way -   Total Score 1   If you checked off any problems, how difficult have  these problems made it for you to do your work, take care of things at home, or get along with other people? -       Fall Risk Screen:  MICHELLE Fall Risk Assessment has not been completed.    Health Habits and Functional and Cognitive Screening:  Functional & Cognitive Status 6/30/2020   Do you have difficulty preparing food and eating? No   Do you have difficulty bathing yourself, getting dressed or grooming yourself? No   Do you have difficulty using the toilet? No   Do you have difficulty moving around from place to place? No   Do you have trouble with steps or getting out of a bed or a chair? No   Current Diet Well Balanced Diet   Dental Exam Up to date   Eye Exam Up to date   Exercise (times per week) 7 times per week   Current Exercise Activities Include Walking   Do you need help using the phone?  No   Are you deaf or do you have serious difficulty hearing?  No   Do you need help with transportation? No   Do you need help shopping? No   Do you need help preparing meals?  No   Do you need help with housework?  No   Do you need help with laundry? No   Do you need help taking your medications? No   Do you need help managing money? No   Do you ever drive or ride in a car without wearing a seat belt? No   Have you felt unusual stress, anger or loneliness in the last month? No   Who do you live with? Alone   If you need help, do you have trouble finding someone available to you? No   Have you been bothered in the last four weeks by sexual problems? No   Do you have difficulty concentrating, remembering or making decisions? No         Does the patient have evidence of cognitive impairment? No    Asprin use counseling:Does not need ASA (and currently is not on it)    Age-appropriate Screening Schedule:  Refer to the list below for future screening recommendations based on patient's age, sex and/or medical conditions. Orders for these recommended tests are listed in the plan section. The patient has been provided with  a written plan.    Health Maintenance   Topic Date Due   • TDAP/TD VACCINES (1 - Tdap) 08/07/1941   • LIPID PANEL  12/17/2020   • ZOSTER VACCINE  Completed   • INFLUENZA VACCINE  Discontinued          The following portions of the patient's history were reviewed and updated as appropriate: allergies, current medications, past family history, past medical history, past social history, past surgical history and problem list.    Outpatient Medications Prior to Visit   Medication Sig Dispense Refill   • albuterol sulfate  (90 Base) MCG/ACT inhaler Inhale 2 puffs Every 6 (Six) Hours As Needed for Wheezing.     • atorvastatin (LIPITOR) 40 MG tablet Take 1 tablet by mouth Daily. 90 tablet 1   • azithromycin (ZITHROMAX) 250 MG tablet Take 250 mg by mouth Daily.     • B Complex Vitamins (VITAMIN B COMPLEX PO) Take 1 tablet by mouth Daily.     • calcium carbonate (OS-EVIN) 600 MG tablet Take 600 mg by mouth Daily. With vitamin D     • cetirizine (zyrTEC) 10 MG tablet Take 10 mg by mouth Daily.     • cholecalciferol (VITAMIN D3) 25 MCG (1000 UT) tablet Take 1,000 Units by mouth Daily.     • ezetimibe (ZETIA) 10 MG tablet Take 1 tablet by mouth Daily. 90 tablet 1   • ferrous sulfate 325 (65 FE) MG tablet Take 1 tablet by mouth Daily With Breakfast. 90 tablet 1   • fluticasone (FLONASE) 50 MCG/ACT nasal spray 2 sprays into the nostril(s) as directed by provider every night at bedtime.     • fluticasone-salmeterol (ADVAIR HFA) 230-21 MCG/ACT inhaler Inhale 2 puffs 2 (Two) Times a Day.     • glucosamine-chondroitin 500-400 MG capsule capsule Take 1 capsule by mouth Daily As Needed.     • ipratropium-albuterol (DUO-NEB) 0.5-2.5 mg/3 ml nebulizer Take 3 mL by nebulization Every 4 (Four) Hours As Needed.     • Lactobacillus (FLORAJEN ACIDOPHILUS) capsule Take 1 tablet by mouth Daily.     • losartan (COZAAR) 50 MG tablet Take 100 mg by mouth Daily.     • Multiple Vitamins-Minerals (PRESERVISION AREDS PO) Take 1 tablet by mouth  Daily.     • O2 (OXYGEN) Inhale 2 L/min Every Night.     • potassium chloride (MICRO-K) 10 MEQ CR capsule Take 1 capsule by mouth Daily. 90 capsule 3   • torsemide (DEMADEX) 20 MG tablet Take 1 tablet by mouth 2 (Two) Times a Day. 180 tablet 2   • verapamil ER (VERELAN) 360 MG 24 hr capsule Take 1 capsule by mouth Every Night. 90 capsule 3   • warfarin (COUMADIN) 3 MG tablet Take 1 tablet (3mg) on Mondays and take 1.5 tablets (4.5 mg) on all other days or as directed. 130 tablet 1   • guaiFENesin (MUCINEX) 600 MG 12 hr tablet Take 2 tablets by mouth Every 12 (Twelve) Hours. 14 tablet 0     No facility-administered medications prior to visit.        Patient Active Problem List   Diagnosis   • Benign essential hypertension   • Chronic coronary artery disease   • Hyperlipidemia   • Mitral valve insufficiency   • Ventricular premature beats   • Ventricular tachycardia (CMS/HCC)   • Persistent atrial fibrillation (CMS/HCC)   • Acid-fast bacteria present   • DNR (do not resuscitate)   • Chronic diastolic CHF (congestive heart failure) (CMS/HCC)   • CHF (congestive heart failure) (CMS/HCC)   • Asymptomatic bacteriuria   • Iron deficiency anemia   • Bronchiectasis (CMS/HCC)   • KINA (mycobacterium avium-intracellulare) (CMS/HCC)   • Atrial fibrillation with rapid ventricular response (CMS/HCC)   • Anxiety   • Exposure to hepatitis A   • Medicare annual wellness visit, subsequent   • Abdominal pain   • Herpes infection   • Moderate asthma with acute exacerbation   • Bronchitis, acute, with bronchospasm   • Abnormal EKG   • Acute bronchitis due to parainfluenza virus   • COPD with acute exacerbation        Advanced Care Planning:  ACP discussion was held with the patient during this visit. Patient has an advance directive in EMR which is still valid.     Review of Systems  Michelle Zaldivar - daughter  Compared to one year ago, the patient feels her physical health is the same.  Compared to one year ago, the patient feels her  "mental health is the same.    Reviewed chart for potential of high risk medication in the elderly: yes  Reviewed chart for potential of harmful drug interactions in the elderly:yes    Objective         Vitals:    06/30/20 1051   BP: 132/60   BP Location: Left arm   Patient Position: Sitting   Cuff Size: Adult   Pulse: 68   SpO2: 96%   Weight: 58.8 kg (129 lb 11.2 oz)   Height: 155.4 cm (61.2\")   PainSc: 0-No pain       Body mass index is 24.35 kg/m².  Discussed the patient's BMI with her. The BMI is in the acceptable range.    Physical Exam          Assessment/Plan   Medicare Risks and Personalized Health Plan  CMS Preventative Services Quick Reference  none    The above risks/problems have been discussed with the patient.  Pertinent information has been shared with the patient in the After Visit Summary.  Follow up plans and orders are seen below in the Assessment/Plan Section.    Diagnoses and all orders for this visit:    1. Medicare annual wellness visit, subsequent (Primary)    2. Bronchiectasis with acute exacerbation (CMS/[facility])    3. Benign essential hypertension      Follow Up:  Return in about 3 months (around 9/30/2020), or if symptoms worsen or fail to improve, for Recheck, Next scheduled follow up.     An After Visit Summary and PPPS were given to the patient.             "

## 2020-07-06 ENCOUNTER — ANTICOAGULATION VISIT (OUTPATIENT)
Dept: PHARMACY | Facility: HOSPITAL | Age: 85
End: 2020-07-06

## 2020-07-06 DIAGNOSIS — I48.19 PERSISTENT ATRIAL FIBRILLATION (HCC): ICD-10-CM

## 2020-07-06 LAB
INR PPP: 2.5 (ref 0.91–1.09)
PROTHROMBIN TIME: 30.5 SECONDS (ref 10–13.8)

## 2020-07-06 PROCEDURE — 85610 PROTHROMBIN TIME: CPT

## 2020-07-06 PROCEDURE — 36416 COLLJ CAPILLARY BLOOD SPEC: CPT

## 2020-07-06 NOTE — PROGRESS NOTES
Anticoagulation Clinic Progress Note    Anticoagulation Summary  As of 2020    INR goal:   2.0-3.0   TTR:   54.6 % (1.8 y)   INR used for dosin.5 (2020)   Warfarin maintenance plan:   4.5 mg every day   Weekly warfarin total:   31.5 mg   No change documented:   Danielle Fierro   Plan last modified:   Lev Mckeon [facility] (2020)   Next INR check:   2020   Priority:   High   Target end date:       Indications    Persistent atrial fibrillation (CMS/HCC) [I48.19]             Anticoagulation Episode Summary     INR check location:       Preferred lab:       Send INR reminders to:    [facility] [place]    Comments:         Anticoagulation Care Providers     Provider Role Specialty Phone number    Marcie Robbins MD Referring Cardiology [phone]          Clinic Interview:      INR History:  Anticoagulation Monitoring 2020   INR 2.1 1.7 2.5   INR Date 2020   INR Goal 2.0-3.0 2.0-3.0 2.0-3.0   Trend Same Up Same   Last Week Total 30 mg 30 mg 31.5 mg   Next Week Total 30 mg 33 mg 31.5 mg   Sun 4.5 mg 4.5 mg 4.5 mg   Mon 3 mg 4.5 mg 4.5 mg   Tue 4.5 mg 4.5 mg 4.5 mg   Wed 4.5 mg 4.5 mg 4.5 mg   Thu 4.5 mg 4.5 mg 4.5 mg   Fri 4.5 mg 6 mg (); Otherwise 4.5 mg 4.5 mg   Sat 4.5 mg 4.5 mg 4.5 mg   Visit Report - - -   Some recent data might be hidden       Plan:  1. INR is therapeutic today- see above in Anticoagulation Summary.   Will instruct Agnieszka Rizzo to continue their warfarin regimen- see above in Anticoagulation Summary.  2. Follow up in 1 week.  3. Patient declines warfarin refills.  4. Verbal and written information provided. Patient expresses understanding and has no further questions at this time.    Danielle Fierro

## 2020-07-13 ENCOUNTER — ANTICOAGULATION VISIT (OUTPATIENT)
Dept: PHARMACY | Facility: HOSPITAL | Age: 85
End: 2020-07-13

## 2020-07-13 DIAGNOSIS — I48.19 PERSISTENT ATRIAL FIBRILLATION (HCC): ICD-10-CM

## 2020-07-13 LAB
INR PPP: 3.2 (ref 0.91–1.09)
PROTHROMBIN TIME: 38.7 SECONDS (ref 10–13.8)

## 2020-07-13 PROCEDURE — G0463 HOSPITAL OUTPT CLINIC VISIT: HCPCS

## 2020-07-13 PROCEDURE — 36416 COLLJ CAPILLARY BLOOD SPEC: CPT

## 2020-07-13 PROCEDURE — 85610 PROTHROMBIN TIME: CPT

## 2020-07-13 NOTE — PROGRESS NOTES
I have supervised and reviewed the notes, assessments, and/or procedures performed by our PharmD Candidate. The documented assessment and plan were developed cooperatively, and the plan was implemented in my presence. I concur with the documentation of this patient encounter.    Lev Mckeon [facility]

## 2020-07-13 NOTE — PROGRESS NOTES
Anticoagulation Clinic Progress Note    Anticoagulation Summary  As of 7/13/2020    INR goal:   2.0-3.0   TTR:   54.7 % (1.8 y)   INR used for dosing:   3.2! (7/13/2020)   Warfarin maintenance plan:   3 mg every Mon; 4.5 mg all other days   Weekly warfarin total:   30 mg   Plan last modified:   Saleem Menon, Pharmacy Intern (7/13/2020)   Next INR check:   7/20/2020   Priority:   High   Target end date:       Indications    Persistent atrial fibrillation (CMS/HCC) [I48.19]             Anticoagulation Episode Summary     INR check location:       Preferred lab:       Send INR reminders to:   [facility]    Comments:         Anticoagulation Care Providers     Provider Role Specialty Phone number    Marcie Robbins MD Referring Cardiology [phone]          Clinic Interview:  Patient Findings     Positives:   Bruising    Comments:   Slightly more bruising than usual       Clinical Outcomes     Comments:   Slightly more bruising than usual         INR History:  Anticoagulation Monitoring 6/26/2020 7/6/2020 7/13/2020   INR 1.7 2.5 3.2   INR Date 6/26/2020 7/6/2020 7/13/2020   INR Goal 2.0-3.0 2.0-3.0 2.0-3.0   Trend Up Same Down   Last Week Total 30 mg 31.5 mg 31.5 mg   Next Week Total 33 mg 31.5 mg 30 mg   Sun 4.5 mg 4.5 mg 4.5 mg   Mon 4.5 mg 4.5 mg 3 mg (7/13)   Tue 4.5 mg 4.5 mg 4.5 mg   Wed 4.5 mg 4.5 mg 4.5 mg   Thu 4.5 mg 4.5 mg 4.5 mg   Fri 6 mg (6/26); Otherwise 4.5 mg 4.5 mg 4.5 mg   Sat 4.5 mg 4.5 mg 4.5 mg   Visit Report - - -   Some recent data might be hidden       Plan:  1. INR is Supratherapeutic today- see above in Anticoagulation Summary.  Will instruct Agnieszka Rizzo to change their warfarin regimen to 3 mg on Mon and 4.5 mg on all other days, recheck INR in 1 wk- see above in Anticoagulation Summary.  2. Follow up in 1 week  3. Patient declines warfarin refills.  4. Verbal and written information provided. Patient was informed to watch for s/sx of bleeding and to seek  immediate medical care in case of a fall. Patient expresses understanding and has no further questions at this time.    Saleem Menon, Pharmacy Intern

## 2020-07-20 ENCOUNTER — ANTICOAGULATION VISIT (OUTPATIENT)
Dept: PHARMACY | Facility: HOSPITAL | Age: 85
End: 2020-07-20

## 2020-07-20 DIAGNOSIS — I48.19 PERSISTENT ATRIAL FIBRILLATION (HCC): ICD-10-CM

## 2020-07-20 LAB
INR PPP: 2.6 (ref 0.91–1.09)
PROTHROMBIN TIME: 31.3 SECONDS (ref 10–13.8)

## 2020-07-20 PROCEDURE — 85610 PROTHROMBIN TIME: CPT

## 2020-07-20 PROCEDURE — 36416 COLLJ CAPILLARY BLOOD SPEC: CPT

## 2020-07-20 NOTE — PROGRESS NOTES
Anticoagulation Clinic Progress Note    Anticoagulation Summary  As of 2020    INR goal:   2.0-3.0   TTR:   54.9 % (1.8 y)   INR used for dosin.6 (2020)   Warfarin maintenance plan:   3 mg every Mon; 4.5 mg all other days   Weekly warfarin total:   30 mg   No change documented:   Lev Mckeon RPH   Plan last modified:   Lev Mckeon RPH (2020)   Next INR check:   8/3/2020   Priority:   High   Target end date:       Indications    Persistent atrial fibrillation (CMS/HCC) [I48.19]             Anticoagulation Episode Summary     INR check location:       Preferred lab:       Send INR reminders to:    [facility]    Comments:         Anticoagulation Care Providers     Provider Role Specialty Phone number    Marcie Robbins MD Referring Cardiology [phone]          Clinic Interview:  Patient Findings     Negatives:   Signs/symptoms of thrombosis, Signs/symptoms of bleeding,   Laboratory test error suspected, Change in health, Change in alcohol use,   Change in activity, Upcoming invasive procedure, Emergency department   visit, Upcoming dental procedure, Missed doses, Extra doses, Change in   medications, Change in diet/appetite, Hospital admission, Bruising, Other   complaints      Clinical Outcomes     Negatives:   Major bleeding event, Thromboembolic event,   Anticoagulation-related hospital admission, Anticoagulation-related ED   visit, Anticoagulation-related fatality        INR History:  Anticoagulation Monitoring 2020   INR 2.5 3.2 2.6   INR Date 2020   INR Goal 2.0-3.0 2.0-3.0 2.0-3.0   Trend Same Down Same   Last Week Total 31.5 mg 31.5 mg 30 mg   Next Week Total 31.5 mg 30 mg 30 mg   Sun 4.5 mg 4.5 mg 4.5 mg   Mon 4.5 mg 3 mg 3 mg   Tue 4.5 mg 4.5 mg 4.5 mg   Wed 4.5 mg 4.5 mg 4.5 mg   Thu 4.5 mg 4.5 mg 4.5 mg   Fri 4.5 mg 4.5 mg 4.5 mg   Sat 4.5 mg 4.5 mg 4.5 mg   Visit Report - - -   Some recent data might be hidden        Plan:  1. INR is Therapeutic today- see above in Anticoagulation Summary.  Will instruct Agnieszka Rizzo to Continue their warfarin regimen- see above in Anticoagulation Summary.  2. Follow up in 2 weeks  3. Patient declines warfarin refills.  4. Verbal and written information provided. Patient expresses understanding and has no further questions at this time.    Lev Mckeon [facility]

## 2020-08-03 ENCOUNTER — ANTICOAGULATION VISIT (OUTPATIENT)
Dept: PHARMACY | Facility: HOSPITAL | Age: 85
End: 2020-08-03

## 2020-08-03 DIAGNOSIS — I48.19 PERSISTENT ATRIAL FIBRILLATION (HCC): ICD-10-CM

## 2020-08-03 LAB
INR PPP: 2.9 (ref 0.91–1.09)
PROTHROMBIN TIME: 35.2 SECONDS (ref 10–13.8)

## 2020-08-03 PROCEDURE — 85610 PROTHROMBIN TIME: CPT

## 2020-08-03 PROCEDURE — 36416 COLLJ CAPILLARY BLOOD SPEC: CPT

## 2020-08-03 NOTE — PROGRESS NOTES
Anticoagulation Clinic Progress Note    Anticoagulation Summary  As of 8/3/2020    INR goal:   2.0-3.0   TTR:   55.8 % (1.9 y)   INR used for dosin.9 (8/3/2020)   Warfarin maintenance plan:   3 mg every Mon; 4.5 mg all other days   Weekly warfarin total:   30 mg   Plan last modified:   Lev Mckeon RPH (2020)   Next INR check:   2020   Priority:   High   Target end date:       Indications    Persistent atrial fibrillation (CMS/HCC) [I48.19]             Anticoagulation Episode Summary     INR check location:       Preferred lab:       Send INR reminders to:    [facility]    Comments:         Anticoagulation Care Providers     Provider Role Specialty Phone number    Marcie Robbins MD Referring Cardiology [phone]          Clinic Interview:  Patient Findings     Negatives:   Signs/symptoms of thrombosis, Signs/symptoms of bleeding,   Laboratory test error suspected, Change in health, Change in alcohol use,   Change in activity, Upcoming invasive procedure, Emergency department   visit, Upcoming dental procedure, Missed doses, Extra doses, Change in   medications, Change in diet/appetite, Hospital admission, Bruising, Other   complaints      Clinical Outcomes     Negatives:   Major bleeding event, Thromboembolic event,   Anticoagulation-related hospital admission, Anticoagulation-related ED   visit, Anticoagulation-related fatality        INR History:  Anticoagulation Monitoring 2020 2020 8/3/2020   INR 3.2 2.6 2.9   INR Date 2020 2020 8/3/2020   INR Goal 2.0-3.0 2.0-3.0 2.0-3.0   Trend Down Same Same   Last Week Total 31.5 mg 30 mg 30 mg   Next Week Total 30 mg 30 mg 30 mg   Sun 4.5 mg 4.5 mg 4.5 mg   Mon 3 mg 3 mg 3 mg   Tue 4.5 mg 4.5 mg 4.5 mg   Wed 4.5 mg 4.5 mg 4.5 mg   Thu 4.5 mg 4.5 mg 4.5 mg   Fri 4.5 mg 4.5 mg 4.5 mg   Sat 4.5 mg 4.5 mg 4.5 mg   Visit Report - - -   Some recent data might be hidden       Plan:  1. INR is Therapeutic today- see above  in Anticoagulation Summary.  Will instruct Agnieszka Rizzo to Continue their warfarin regimen- see above in Anticoagulation Summary.  2. Follow up in 2 weeks  3. Patient declines warfarin refills.  4. Verbal and written information provided. Patient expresses understanding and has no further questions at this time.    Saleem Menon, Pharmacy Intern

## 2020-08-03 NOTE — PROGRESS NOTES
I have supervised and reviewed the notes, assessments, and/or procedures performed by our PharmD Candidate. I concur with the documentation of this patient encounter.    Lev Mckeon [facility]

## 2020-08-11 ENCOUNTER — TELEPHONE (OUTPATIENT)
Dept: CARDIOLOGY | Facility: CLINIC | Age: 85
End: 2020-08-11

## 2020-08-11 ENCOUNTER — OFFICE VISIT (OUTPATIENT)
Dept: CARDIOLOGY | Facility: CLINIC | Age: 85
End: 2020-08-11

## 2020-08-11 VITALS
HEIGHT: 61 IN | BODY MASS INDEX: 24.55 KG/M2 | HEART RATE: 72 BPM | OXYGEN SATURATION: 95 % | TEMPERATURE: 95.9 F | SYSTOLIC BLOOD PRESSURE: 130 MMHG | WEIGHT: 130 LBS | DIASTOLIC BLOOD PRESSURE: 62 MMHG

## 2020-08-11 DIAGNOSIS — E78.2 MIXED HYPERLIPIDEMIA: ICD-10-CM

## 2020-08-11 DIAGNOSIS — I48.19 PERSISTENT ATRIAL FIBRILLATION (HCC): ICD-10-CM

## 2020-08-11 DIAGNOSIS — I25.10 CHRONIC CORONARY ARTERY DISEASE: Primary | ICD-10-CM

## 2020-08-11 DIAGNOSIS — I10 BENIGN ESSENTIAL HYPERTENSION: ICD-10-CM

## 2020-08-11 DIAGNOSIS — I50.32 CHRONIC DIASTOLIC CHF (CONGESTIVE HEART FAILURE) (HCC): ICD-10-CM

## 2020-08-11 PROCEDURE — 99214 OFFICE O/P EST MOD 30 MIN: CPT | Performed by: INTERNAL MEDICINE

## 2020-08-11 PROCEDURE — 93000 ELECTROCARDIOGRAM COMPLETE: CPT | Performed by: INTERNAL MEDICINE

## 2020-08-11 NOTE — TELEPHONE ENCOUNTER
We would like to offer our home testing service to her so we will contact her and get her set up.  THANK YOU

## 2020-08-11 NOTE — TELEPHONE ENCOUNTER
The patient would like to do home INR testing. Can you please put together a form for Dr Robbins to sign.       Thanks  Noe

## 2020-08-11 NOTE — PROGRESS NOTES
PATIENTINFORMATION    Date of Office Visit: 20  Encounter Provider: Marcie Robbins MD  Place of Service: [facility] CARDIOLOGY  Patient Name: Agnieszka Rizzo  : 1930    Subjective:         Patient ID: Agnieszka Rizzo is a 89 y.o. female.      History of Present Illness    This is a lady I met while she was hospitalized in 2016. She has a significant pulmonary history and was having a bronchoscopy and unfortunately developed a pneumothorax. She was found to be in rate -controlled atrial fibrillation. She had previously been seen by Dr. Ana Goodrich for history of hypertension, hyperlipidemia, mild mitral valve prolapse and nonobstructive coronary artery disease diagnosed by heart catheterization in .      Echocardiogram November 15, 2016:  All left ventricular wall segments contract normally.  Left ventricular function is normal. Estimated EF = 58%.  Left atrial cavity size is moderately dilated.  Mild tricuspid valve regurgitation is present.  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 They felt she would do well on oral anticoagulation and I started her on Pradaxa this was subsequently changed to warfarin. I did not do a stress test while she was hospitalized because she was wheezing and I did not fill comfortable giving her Lexiscan at that time and I did not when he is dobutamine because of her atrial fibrillation.       She was able to have a stress as an outpt on 16 and this was normal. She continued to complain of dyspnea on exertion and so I had her set up for a cardioversion. She had a couple of hospitalizations in the meantime for respiratory issues. While she was in the hospital in 2017, I attempted to cardiovert her. I shocked her 3 times that she did not remain in sinus rhythm. I spoke with her family and  we decided to continue with rate control and anticoagulation.      She was hospitalized in 02/2017. She had started Voriconazole for treatment of pulmonary aspergillosis by Dr. Tucker. She became very nauseous and sick, and threw up for an entire day. She came into the hospital confused and weak. Her sodium was at 109. She was found to have a left clavicle fracture from a fall. She was discharged to [facility], where she has been. Unfortunately, as a result of the treatment for the aspergillosis, she developed C. difficile and was rehospitalized for treatment of this in March 2017. As a part of his treatment she did receive IV fluids. She was discharged but then I readmitted her on March 22 for IV diuresis. She was volume overloaded and in diastolic heart failure. She was back in the hospital on April 2 with more C. difficile colitis. Again she was treated with IV fluids and became volume overloaded. I diuresed her in the hospital and she was only mildly volume overloaded at discharge.     She was hospitalized from May 31 of June 9 2017.  While there she had some rapid ventricular response due to her atrial fibrillation and being sick with community-acquired pneumonia.  Some changes were made to her medication but in the end she was discharged on verapamil 360 mg once a day and digoxin 0.125 mg a day.     She was hospitalized in December 2019 with a COPD exacerbation/viral bronchitis.      She is doing well.  She actually climb the 5 flights of stairs up to the office.  She is not having chest pain or shortness of breath.  She is exercising by walking regularly and has improved her exercise capacity.  She has not had any palpitations.  She uses oxygen at night.    Review of Systems   Constitution: Negative for fever, malaise/fatigue, weight gain and weight loss.   HENT: Negative for ear pain, hearing loss, nosebleeds and sore throat.    Eyes: Positive for vision loss in right eye. Negative for double vision, pain  "and vision loss in left eye.   Cardiovascular:        See history of present illness.   Respiratory: Negative for cough, shortness of breath, sleep disturbances due to breathing, snoring and wheezing.    Endocrine: Negative for cold intolerance, heat intolerance and polyuria.   Skin: Negative for itching, poor wound healing and rash.   Musculoskeletal: Negative for joint pain, joint swelling and myalgias.   Gastrointestinal: Negative for abdominal pain, diarrhea, hematochezia, nausea and vomiting.   Genitourinary: Negative for hematuria and hesitancy.   Neurological: Negative for numbness, paresthesias and seizures.   Psychiatric/Behavioral: Negative for depression. The patient is not nervous/anxious.            ECG 12 Lead  Date/Time: 8/11/2020 11:41 AM  Performed by: Marcie Robbins MD  Authorized by: Marcie Robbins MD   Comparison: compared with previous ECG from 6/12/2020  Similar to previous ECG  Rhythm: sinus rhythm  BPM: 65  Conduction: conduction normal  ST Segments: ST segments normal  T Waves: T waves normal  Other findings: non-specific ST-T wave changes    Clinical impression: normal ECG               Objective:     /62 (BP Location: Left arm)   Pulse 72   Temp 95.9 °F (35.5 °C)   Ht 154.9 cm (61\")   Wt 59 kg (130 lb)   SpO2 95%   BMI 24.56 kg/m²  Body mass index is 24.56 kg/m².     Physical Exam   Constitutional: She appears well-developed.   HENT:   Head: Normocephalic and atraumatic.   Eyes: Pupils are equal, round, and reactive to light. Conjunctivae and lids are normal. Lids are everted and swept, no foreign bodies found.   Neck: Normal range of motion. No JVD present. Carotid bruit is not present. No tracheal deviation present. No thyroid mass present.   Cardiovascular: Normal rate, regular rhythm and normal heart sounds.   Pulses:       Dorsalis pedis pulses are 2+ on the right side, and 2+ on the left side.   Pulmonary/Chest: Effort normal and breath sounds normal.   Abdominal: " Normal appearance and bowel sounds are normal.   Musculoskeletal: Normal range of motion.   Neurological: She is alert. She has normal strength.   Skin: Skin is warm, dry and intact.   Psychiatric: She has a normal mood and affect. Her behavior is normal.   Vitals reviewed.      Lab Review: I reviewed her most recent labs which were during an ER visit in February 2020.  She has had numerous INR since then and is followed by the anticoagulation clinic.      Assessment/Plan:       1. Atrial fibrillation. She failed cardioversion in January 2017. She has a CHADS2-Vasc score of 5. She is anticoagulated with warfarin.  She has mostly been in sinus rhythm.  She had a paroxysm of A. fib associated with upper respiratory tract infection.  • The patient's CHADS2-VASc score is 5     2. Chronic diastolic heart failure. She is on torsemide.  She is euvolemic.     3. Dyspnea on exertion. Multifactorial.  Much improved.  She is exercising regularly.     4. Mitral valve prolapse with very mild mitral regurgitation.     5. Mild tricuspid regurgitation without pulmonary hypertension.     6. Nonobstructive coronary artery disease diagnosed by catheter in 2007. She has noted coronary artery calcification on CT scans. Nuclear stress 12/16 was normal.  he has had some chest discomfort so I am point to repeat the stress test.     7. Hypertension. Her blood pressure is controlled.      8. Hyperlipidemia. Zetia and atorvastatin.     9. Hyponatremia. Stable.     10. C. difficile diarrhea. 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  She has completed her antibiotic regimen.  She has a follow-up appointment with Dr. Tucker early next year     She looks great.  I am going to work on getting a home INR monitor for her.  I will see her back in a year unless she is having problems sooner.    Orders Placed This Encounter   Procedures   • ECG 12 Lead     This order was created via  procedure documentation        Discharge Medications           Accurate as of August 11, 2020 11:58 AM. If you have any questions, ask your nurse or doctor.               Changes to Medications      Instructions Start Date   guaiFENesin 600 MG 12 hr tablet  Commonly known as:  MUCINEX  What changed:    · when to take this  · reasons to take this   1,200 mg, Oral, Every 12 Hours Scheduled         Continue These Medications      Instructions Start Date   albuterol sulfate  (90 Base) MCG/ACT inhaler  Commonly known as:  PROVENTIL HFA;VENTOLIN HFA;PROAIR HFA   2 puffs, Inhalation, Every 6 Hours PRN      atorvastatin 40 MG tablet  Commonly known as:  LIPITOR   40 mg, Oral, Daily      azithromycin 250 MG tablet  Commonly known as:  ZITHROMAX   250 mg, Oral, Daily      calcium carbonate 600 MG tablet  Commonly known as:  OS-EVIN   600 mg, Oral, Daily, With vitamin D      cetirizine 10 MG tablet  Commonly known as:  zyrTEC   10 mg, Oral, Daily      cholecalciferol 25 MCG (1000 UT) tablet  Commonly known as:  VITAMIN D3   1,000 Units, Oral, Daily      ezetimibe 10 MG tablet  Commonly known as:  ZETIA   10 mg, Oral, Daily      ferrous sulfate 325 (65 FE) MG tablet   325 mg, Oral, Daily With Breakfast      Florajen Acidophilus capsule   1 tablet, Oral, Daily      fluticasone 50 MCG/ACT nasal spray  Commonly known as:  FLONASE   2 sprays, Nasal, Every Night at Bedtime      fluticasone-salmeterol 230-21 MCG/ACT inhaler  Commonly known as:  ADVAIR HFA   2 puffs, Inhalation, 2 Times Daily - RT      glucosamine-chondroitin 500-400 MG capsule capsule   1 capsule, Oral, Daily PRN      ipratropium-albuterol 0.5-2.5 mg/3 ml nebulizer  Commonly known as:  DUO-NEB   3 mL, Nebulization, Every 4 Hours PRN      losartan 50 MG tablet  Commonly known as:  COZAAR   100 mg, Oral, Daily      O2  Commonly known as:  OXYGEN   2 L/min, Inhalation, Nightly      potassium chloride 10 MEQ CR capsule  Commonly known as:  MICRO-K   10 mEq, Oral,  Daily      PRESERVISION AREDS PO   1 tablet, Oral, Daily      torsemide 20 MG tablet  Commonly known as:  DEMADEX   20 mg, Oral, 2 Times Daily      verapamil  MG 24 hr capsule  Commonly known as:  VERELAN   360 mg, Oral, Nightly      VITAMIN B COMPLEX PO   1 tablet, Oral, Daily      warfarin 3 MG tablet  Commonly known as:  COUMADIN   Take 1 tablet (3mg) on Mondays and take 1.5 tablets (4.5 mg) on all other days or as directed.                    Marcie Robbins MD  08/11/20  11:58

## 2020-08-17 ENCOUNTER — ANTICOAGULATION VISIT (OUTPATIENT)
Dept: PHARMACY | Facility: HOSPITAL | Age: 85
End: 2020-08-17

## 2020-08-17 DIAGNOSIS — I48.19 PERSISTENT ATRIAL FIBRILLATION (HCC): ICD-10-CM

## 2020-08-17 LAB
INR PPP: 1.9 (ref 0.91–1.09)
PROTHROMBIN TIME: 22.4 SECONDS (ref 10–13.8)

## 2020-08-17 PROCEDURE — 36416 COLLJ CAPILLARY BLOOD SPEC: CPT

## 2020-08-17 PROCEDURE — 85610 PROTHROMBIN TIME: CPT

## 2020-08-17 PROCEDURE — G0463 HOSPITAL OUTPT CLINIC VISIT: HCPCS

## 2020-08-17 NOTE — PROGRESS NOTES
Anticoagulation Clinic Progress Note    Anticoagulation Summary  As of 2020    INR goal:   2.0-3.0   TTR:   56.5 % (1.9 y)   INR used for dosin.9! (2020)   Warfarin maintenance plan:   3 mg every Mon; 4.5 mg all other days   Weekly warfarin total:   30 mg   Plan last modified:   Lev Mckeon RPH (2020)   Next INR check:   2020   Priority:   High   Target end date:       Indications    Persistent atrial fibrillation (CMS/HCC) [I48.19]             Anticoagulation Episode Summary     INR check location:       Preferred lab:       Send INR reminders to:   [facility]    Comments:         Anticoagulation Care Providers     Provider Role Specialty Phone number    Marcie Robbins MD Referring Cardiology [phone]          Clinic Interview:  Patient Findings     Positives:   Change in diet/appetite    Negatives:   Signs/symptoms of thrombosis, Signs/symptoms of bleeding,   Laboratory test error suspected, Change in health, Change in alcohol use,   Change in activity, Upcoming invasive procedure, Emergency department   visit, Upcoming dental procedure, Missed doses, Extra doses, Change in   medications, Hospital admission, Bruising, Other complaints    Comments:   Broccoli x 2 days late last week.       Clinical Outcomes     Negatives:   Major bleeding event, Thromboembolic event,   Anticoagulation-related hospital admission, Anticoagulation-related ED   visit, Anticoagulation-related fatality    Comments:   Broccoli x 2 days late last week.         INR History:  Anticoagulation Monitoring 2020 8/3/2020 2020   INR 2.6 2.9 1.9   INR Date 2020 8/3/2020 2020   INR Goal 2.0-3.0 2.0-3.0 2.0-3.0   Trend Same Same Same   Last Week Total 30 mg 30 mg 30 mg   Next Week Total 30 mg 30 mg 31.5 mg   Sun 4.5 mg 4.5 mg 4.5 mg   Mon 3 mg 3 mg 4.5 mg (); Otherwise 3 mg   Tue 4.5 mg 4.5 mg 4.5 mg   Wed 4.5 mg 4.5 mg 4.5 mg   Thu 4.5 mg 4.5 mg 4.5 mg   Fri 4.5 mg 4.5 mg  4.5 mg   Sat 4.5 mg 4.5 mg 4.5 mg   Visit Report - - -   Some recent data might be hidden       Plan:  1. INR is Subtherapeutic today- see above in Anticoagulation Summary.  Will instruct Agnieszka Rizzo to Change their warfarin regimen- see above in Anticoagulation Summary.  2. Follow up in 2 weeks  3. Patient declines warfarin refills.  4. Verbal and written information provided. Patient expresses understanding and has no further questions at this time.    Lev Mckeon [facility]

## 2020-08-24 ENCOUNTER — PATIENT OUTREACH (OUTPATIENT)
Dept: CASE MANAGEMENT | Facility: OTHER | Age: 85
End: 2020-08-24

## 2020-08-24 NOTE — OUTREACH NOTE
Patient Outreach Note  Talked with patient. She reports improvement regarding energy and  no difficulty with diarrhea; chest pain; SOB appetite or sleeping. She states to be compliant with medications; medical appointments; monitoring of blood pressure; nebulizer; O2 SAT and PT/INR. She will be receiving PT/INR home monitor for home use. Patient reports improvement with peripheral vision to right eye even though hazy. Confirms opthalmology appointment with Dr. Blankenship next week and has completed Medicare Annual Wellness Visit. Patient has been walking for exercise about one mile per day without difficulty of SOB.  RN-ACM commended patient on improvement regarding exercising and her management of her medical care. Reviewed with patient education regarding CHF; diet; My Chart; gaps in care (states she will be getting flu vaccine) and COVID 19 precautions/ She verbalized understanding. No further questions or concerns voiced at this time.     Ria Hinton RN  Ambulatory     8/24/2020, 15:36

## 2020-09-01 ENCOUNTER — ANTICOAGULATION VISIT (OUTPATIENT)
Dept: PHARMACY | Facility: HOSPITAL | Age: 85
End: 2020-09-01

## 2020-09-01 DIAGNOSIS — I48.19 PERSISTENT ATRIAL FIBRILLATION (HCC): ICD-10-CM

## 2020-09-01 LAB
INR PPP: 2.6 (ref 0.91–1.09)
PROTHROMBIN TIME: 30.7 SECONDS (ref 10–13.8)

## 2020-09-01 PROCEDURE — 85610 PROTHROMBIN TIME: CPT

## 2020-09-01 PROCEDURE — 36416 COLLJ CAPILLARY BLOOD SPEC: CPT

## 2020-09-01 NOTE — PROGRESS NOTES
Anticoagulation Clinic Progress Note    Anticoagulation Summary  As of 2020    INR goal:   2.0-3.0   TTR:   57.1 % (1.9 y)   INR used for dosin.6 (2020)   Warfarin maintenance plan:   3 mg every Mon; 4.5 mg all other days   Weekly warfarin total:   30 mg   No change documented:   Sara Santana   Plan last modified:   Lev Mckeon RPH (2020)   Next INR check:   2020   Priority:   High   Target end date:       Indications    Persistent atrial fibrillation (CMS/HCC) [I48.19]             Anticoagulation Episode Summary     INR check location:       Preferred lab:       Send INR reminders to:    [facility]    Comments:         Anticoagulation Care Providers     Provider Role Specialty Phone number    Marcie Robbins MD Referring Cardiology [phone]          Clinic Interview:  Patient Findings     Negatives:   Signs/symptoms of thrombosis, Signs/symptoms of bleeding,   Laboratory test error suspected, Change in health, Change in alcohol use,   Change in activity, Upcoming invasive procedure, Emergency department   visit, Upcoming dental procedure, Missed doses, Extra doses, Change in   medications, Change in diet/appetite, Hospital admission, Bruising, Other   complaints      Clinical Outcomes     Negatives:   Major bleeding event, Thromboembolic event,   Anticoagulation-related hospital admission, Anticoagulation-related ED   visit, Anticoagulation-related fatality        INR History:  Anticoagulation Monitoring 8/3/2020 2020 2020   INR 2.9 1.9 2.6   INR Date 8/3/2020 2020 2020   INR Goal 2.0-3.0 2.0-3.0 2.0-3.0   Trend Same Same Same   Last Week Total 30 mg 30 mg 30 mg   Next Week Total 30 mg 31.5 mg 30 mg   Sun 4.5 mg 4.5 mg 4.5 mg   Mon 3 mg 4.5 mg (); Otherwise 3 mg 3 mg   Tue 4.5 mg 4.5 mg 4.5 mg   Wed 4.5 mg 4.5 mg 4.5 mg   Thu 4.5 mg 4.5 mg 4.5 mg   Fri 4.5 mg 4.5 mg 4.5 mg   Sat 4.5 mg 4.5 mg 4.5 mg   Visit Report - - -   Some  recent data might be hidden       Plan:  1. INR is therapeutic today- see above in Anticoagulation Summary.   Will instruct Agnieszka Rizzo to continue their warfarin regimen- see above in Anticoagulation Summary.  2. Follow up in 2 weeks.  3. Patient desires warfarin refills.  4. Verbal and written information provided. Patient expresses understanding and has no further questions at this time.    Sara Santana

## 2020-09-16 ENCOUNTER — ANTICOAGULATION VISIT (OUTPATIENT)
Dept: PHARMACY | Facility: HOSPITAL | Age: 85
End: 2020-09-16

## 2020-09-16 DIAGNOSIS — I48.19 PERSISTENT ATRIAL FIBRILLATION (HCC): ICD-10-CM

## 2020-09-16 LAB
INR PPP: 2.1 (ref 0.91–1.09)
PROTHROMBIN TIME: 25.5 SECONDS (ref 10–13.8)

## 2020-09-16 PROCEDURE — 36416 COLLJ CAPILLARY BLOOD SPEC: CPT

## 2020-09-16 PROCEDURE — 85610 PROTHROMBIN TIME: CPT

## 2020-09-16 RX ORDER — AZITHROMYCIN 250 MG/1
250 TABLET, FILM COATED ORAL DAILY
COMMUNITY
End: 2022-07-19 | Stop reason: HOSPADM

## 2020-09-16 RX ORDER — FERROUS SULFATE 325(65) MG
TABLET ORAL
Qty: 90 TABLET | Refills: 0 | Status: SHIPPED | OUTPATIENT
Start: 2020-09-16 | End: 2020-12-23

## 2020-09-16 RX ORDER — WARFARIN SODIUM 3 MG/1
TABLET ORAL
Qty: 130 TABLET | Refills: 1 | Status: SHIPPED | OUTPATIENT
Start: 2020-09-16 | End: 2020-12-09 | Stop reason: SDUPTHER

## 2020-09-16 NOTE — PROGRESS NOTES
Anticoagulation Clinic Progress Note    Anticoagulation Summary  As of 2020    INR goal:  2.0-3.0   TTR:  58.0 % (2 y)   INR used for dosin.1 (2020)   Warfarin maintenance plan:  3 mg every Mon; 4.5 mg all other days   Weekly warfarin total:  30 mg   No change documented:  Danielle Fierro   Plan last modified:  Lev Mckeon [facility] (2020)   Next INR check:  2020   Priority:  High   Target end date:      Indications    Persistent atrial fibrillation (CMS/HCC) [I48.19]             Anticoagulation Episode Summary     INR check location:      Preferred lab:      Send INR reminders to:   [facility]    Comments:        Anticoagulation Care Providers     Provider Role Specialty Phone number    Marcie Robbins MD Referring Cardiology [phone]          Clinic Interview:      INR History:  Anticoagulation Monitoring 2020   INR 1.9 2.6 2.1   INR Date 2020   INR Goal 2.0-3.0 2.0-3.0 2.0-3.0   Trend Same Same Same   Last Week Total 30 mg 30 mg 30 mg   Next Week Total 31.5 mg 30 mg 30 mg   Sun 4.5 mg 4.5 mg 4.5 mg   Mon 4.5 mg (); Otherwise 3 mg 3 mg 3 mg   Tue 4.5 mg 4.5 mg 4.5 mg   Wed 4.5 mg 4.5 mg 4.5 mg   Thu 4.5 mg 4.5 mg 4.5 mg   Fri 4.5 mg 4.5 mg 4.5 mg   Sat 4.5 mg 4.5 mg 4.5 mg   Visit Report - - -   Some recent data might be hidden       Plan:  1. INR is therapeutic today- see above in Anticoagulation Summary.   Will instruct Agnieszka Rizzo to continue their warfarin regimen- see above in Anticoagulation Summary.  2. Follow up in 2 weeks.  3. Patient desires warfarin refills.  4. Verbal and written information provided. Patient expresses understanding and has no further questions at this time.    Danielle Fierro

## 2020-09-23 ENCOUNTER — EPISODE CHANGES (OUTPATIENT)
Dept: CASE MANAGEMENT | Facility: OTHER | Age: 85
End: 2020-09-23

## 2020-09-24 ENCOUNTER — EPISODE CHANGES (OUTPATIENT)
Dept: CASE MANAGEMENT | Facility: OTHER | Age: 85
End: 2020-09-24

## 2020-09-30 ENCOUNTER — ANTICOAGULATION VISIT (OUTPATIENT)
Dept: PHARMACY | Facility: HOSPITAL | Age: 85
End: 2020-09-30

## 2020-09-30 DIAGNOSIS — I48.19 PERSISTENT ATRIAL FIBRILLATION (HCC): ICD-10-CM

## 2020-09-30 LAB
INR PPP: 2.3 (ref 0.91–1.09)
PROTHROMBIN TIME: 27 SECONDS (ref 10–13.8)

## 2020-09-30 PROCEDURE — 85610 PROTHROMBIN TIME: CPT

## 2020-09-30 PROCEDURE — 36416 COLLJ CAPILLARY BLOOD SPEC: CPT

## 2020-09-30 NOTE — PROGRESS NOTES
Anticoagulation Clinic Progress Note    Anticoagulation Summary  As of 2020    INR goal:  2.0-3.0   TTR:  58.8 % (2 y)   INR used for dosin.3 (2020)   Warfarin maintenance plan:  3 mg every Mon; 4.5 mg all other days   Weekly warfarin total:  30 mg   No change documented:  Sara Santana   Plan last modified:  Lev Mckeon RPH (2020)   Next INR check:  10/28/2020   Priority:  High   Target end date:      Indications    Persistent atrial fibrillation (CMS/HCC) [I48.19]             Anticoagulation Episode Summary     INR check location:      Preferred lab:      Send INR reminders to:   [facility]    Comments:        Anticoagulation Care Providers     Provider Role Specialty Phone number    Marcie Robbins MD Referring Cardiology [phone]          Clinic Interview:  Patient Findings     Negatives:  Signs/symptoms of thrombosis, Signs/symptoms of bleeding,   Laboratory test error suspected, Change in health, Change in alcohol use,   Change in activity, Upcoming invasive procedure, Emergency department   visit, Upcoming dental procedure, Missed doses, Extra doses, Change in   medications, Change in diet/appetite, Hospital admission, Bruising, Other   complaints      Clinical Outcomes     Negatives:  Major bleeding event, Thromboembolic event,   Anticoagulation-related hospital admission, Anticoagulation-related ED   visit, Anticoagulation-related fatality        INR History:  Anticoagulation Monitoring 2020   INR 2.6 2.1 2.3   INR Date 2020   INR Goal 2.0-3.0 2.0-3.0 2.0-3.0   Trend Same Same Same   Last Week Total 30 mg 30 mg 30 mg   Next Week Total 30 mg 30 mg 30 mg   Sun 4.5 mg 4.5 mg 4.5 mg   Mon 3 mg 3 mg 3 mg   Tue 4.5 mg 4.5 mg 4.5 mg   Wed 4.5 mg 4.5 mg 4.5 mg   Thu 4.5 mg 4.5 mg 4.5 mg   Fri 4.5 mg 4.5 mg 4.5 mg   Sat 4.5 mg 4.5 mg 4.5 mg   Visit Report - - -   Some recent data might be hidden       Plan:  1.  INR is therapeutic today- see above in Anticoagulation Summary.   Will instruct Agnieszka Rizzo to continue their warfarin regimen- see above in Anticoagulation Summary.  2. Follow up in 4 weeks.  3. Patient declines warfarin refills.  4. Verbal and written information provided. Patient expresses understanding and has no further questions at this time.    Sara Santana

## 2020-10-21 ENCOUNTER — TELEPHONE (OUTPATIENT)
Dept: INTERNAL MEDICINE | Facility: CLINIC | Age: 85
End: 2020-10-21

## 2020-10-21 NOTE — TELEPHONE ENCOUNTER
Caller: Agnieszka Rizzo    Relationship: Self    Best call back number: [phone]    What medication are you requesting: ENERGY BOOSTER, PEP IN HER STEP, DECREASE DEPRESSION. SAYS DR. COTTER PRESCRIBED HER SOME TYPE OF MEDICATION YEARS AGO FOR THIS SAME ISSUE, AND SHE WOULD LIKE TO HAVE IT AGAIN. PLEASE REVIEW HER CHART, AND PRESCRIBE THAT AGAIN IF APPLICABLE.     What are your current symptoms: DECREASED ENERGY, INCREASED SADNESS    How long have you been experiencing symptoms: SEVERAL MONTHS    Have you had these symptoms before:    [x] Yes  [] No    Have you been treated for these symptoms before:   [x] Yes  [] No    If a prescription is needed, what is your preferred pharmacy and phone number: [facility] [address] [place] [address] AT [facility] & [facility] - [phone] [facility] [phone] FX     Additional notes:ENERGY BOOSTER, PEP IN HER STEP, DECREASE DESPRESSION. SAYS DR. COTTER PRESCRIBED HER SOME TYPE OF MEDICATION YEARS AGO FOR THIS SAME ISSUE, AND SHE WOULD LIKE TO HAVE IT AGAIN. PLEASE REVIEW HER CHART, AND PRESCRIBE THAT AGAIN IF APPLICABLE.

## 2020-10-23 DIAGNOSIS — F41.9 ANXIETY: Primary | ICD-10-CM

## 2020-10-23 RX ORDER — CITALOPRAM 10 MG/1
10 TABLET ORAL DAILY
Qty: 30 TABLET | Refills: 3 | Status: SHIPPED | OUTPATIENT
Start: 2020-10-23 | End: 2021-08-24

## 2020-10-28 ENCOUNTER — ANTICOAGULATION VISIT (OUTPATIENT)
Dept: PHARMACY | Facility: HOSPITAL | Age: 85
End: 2020-10-28

## 2020-10-28 DIAGNOSIS — I48.19 PERSISTENT ATRIAL FIBRILLATION (HCC): ICD-10-CM

## 2020-10-28 LAB
INR PPP: 2.4 (ref 0.91–1.09)
PROTHROMBIN TIME: 28.4 SECONDS (ref 10–13.8)

## 2020-10-28 PROCEDURE — 85610 PROTHROMBIN TIME: CPT

## 2020-10-28 PROCEDURE — 36416 COLLJ CAPILLARY BLOOD SPEC: CPT

## 2020-10-28 NOTE — PROGRESS NOTES
Anticoagulation Clinic Progress Note    Anticoagulation Summary  As of 10/28/2020    INR goal:  2.0-3.0   TTR:  60.3 % (2.1 y)   INR used for dosin.4 (10/28/2020)   Warfarin maintenance plan:  3 mg every Mon; 4.5 mg all other days   Weekly warfarin total:  30 mg   No change documented:  Sara Santana   Plan last modified:  Lev Mckeon RP (2020)   Next INR check:  2020   Priority:  High   Target end date:      Indications    Persistent atrial fibrillation (CMS/HCC) [I48.19]             Anticoagulation Episode Summary     INR check location:      Preferred lab:      [facility] [place]    Comments:        Anticoagulation Care Providers     Provider Role Specialty Phone number    Marcie Robbins MD Referring Cardiology [phone]          Clinic Interview:  Patient Findings     Positives:  Change in medications        INR History:  Anticoagulation Monitoring 2020 2020 10/28/2020   INR 2.1 2.3 2.4   INR Date 2020 2020 10/28/2020   INR Goal 2.0-3.0 2.0-3.0 2.0-3.0   Trend Same Same Same   Last Week Total 30 mg 30 mg 30 mg   Next Week Total 30 mg 30 mg 30 mg   Sun 4.5 mg 4.5 mg 4.5 mg   Mon 3 mg 3 mg 3 mg   Tue 4.5 mg 4.5 mg 4.5 mg   Wed 4.5 mg 4.5 mg 4.5 mg   Thu 4.5 mg 4.5 mg 4.5 mg   Fri 4.5 mg 4.5 mg 4.5 mg   Sat 4.5 mg 4.5 mg 4.5 mg   Visit Report - - -   Some recent data might be hidden       Plan:  1. INR is therapeutic today- see above in Anticoagulation Summary.   Will instruct Agnieszka Rizzo to continue their warfarin regimen- see above in Anticoagulation Summary.  2. Follow up in 4 weeks.  3. Patient declines warfarin refills.  4. Verbal and written information provided. Patient expresses understanding and has no further questions at this time.    Sara Santana

## 2020-11-03 RX ORDER — ATORVASTATIN CALCIUM 40 MG/1
TABLET, FILM COATED ORAL
Qty: 90 TABLET | Refills: 0 | Status: SHIPPED | OUTPATIENT
Start: 2020-11-03 | End: 2021-01-28

## 2020-11-03 RX ORDER — TORSEMIDE 20 MG/1
TABLET ORAL
Qty: 180 TABLET | Refills: 1 | Status: SHIPPED | OUTPATIENT
Start: 2020-11-03 | End: 2020-12-29

## 2020-11-10 LAB — INR PPP: 1.8

## 2020-11-10 RX ORDER — EZETIMIBE 10 MG/1
TABLET ORAL
Qty: 90 TABLET | Refills: 3 | Status: SHIPPED | OUTPATIENT
Start: 2020-11-10 | End: 2021-08-24 | Stop reason: SDUPTHER

## 2020-11-11 ENCOUNTER — ANTICOAGULATION VISIT (OUTPATIENT)
Dept: PHARMACY | Facility: HOSPITAL | Age: 85
End: 2020-11-11

## 2020-11-11 DIAGNOSIS — I48.19 PERSISTENT ATRIAL FIBRILLATION (HCC): ICD-10-CM

## 2020-11-11 NOTE — PROGRESS NOTES
Anticoagulation Clinic Progress Note    Anticoagulation Summary  As of 2020    INR goal:  2.0-3.0   TTR:  60.4 % (2.1 y)   INR used for dosin.80 (11/10/2020)   Warfarin maintenance plan:  3 mg every Mon; 4.5 mg all other days   Weekly warfarin total:  30 mg   Plan last modified:  Lev Mckeon [facility] (2020)   Next INR check:  2020   Priority:  High   Target end date:      Indications    Persistent atrial fibrillation (CMS/HCC) [I48.19]             Anticoagulation Episode Summary     INR check location:      Preferred lab:      Send INR reminders to:   [facility]    Comments:        Anticoagulation Care Providers     Provider Role Specialty Phone number    Marcie Robbins MD Referring Cardiology [phone]          Clinic Interview:  Patient Findings-no changes   Negatives:  Signs/symptoms of thrombosis, Signs/symptoms of bleeding,   Laboratory test error suspected, Change in health, Change in alcohol use,   Change in activity, Upcoming invasive procedure, Emergency department   visit, Upcoming dental procedure, Missed doses, Extra doses, Change in   medications, Change in diet/appetite, Hospital admission, Bruising, Other   complaints      Clinical Outcomes     Negatives:  Major bleeding event, Thromboembolic event,   Anticoagulation-related hospital admission, Anticoagulation-related ED   visit, Anticoagulation-related fatality        INR History:  Anticoagulation Monitoring 2020 10/28/2020 2020   INR 2.3 2.4 1.80   INR Date 2020 10/28/2020 11/10/2020   INR Goal 2.0-3.0 2.0-3.0 2.0-3.0   Trend Same Same Same   Last Week Total 30 mg 30 mg 30 mg   Next Week Total 30 mg 30 mg 31.5 mg   Sun 4.5 mg 4.5 mg 4.5 mg   Mon 3 mg 3 mg 3 mg   Tue 4.5 mg 4.5 mg -   Wed 4.5 mg 4.5 mg 6 mg ()   Thu 4.5 mg 4.5 mg 4.5 mg   Fri 4.5 mg 4.5 mg 4.5 mg   Sat 4.5 mg 4.5 mg 4.5 mg   Visit Report - - -   Some recent data might be hidden       Plan:  1. INR is Subtherapeutic  today- see above in Anticoagulation Summary.   Will instruct Agnieszka Rizzo to Increase their warfarin regimen- see above in Anticoagulation Summary.  2. Follow up in 1 week  3. Discussed with patient. They have been instructed to call if any changes in medications, doses, concerns, etc. Patient expresses understanding and has no further questions at this time.    Feli Marcos, [facility]

## 2020-11-17 ENCOUNTER — ANTICOAGULATION VISIT (OUTPATIENT)
Dept: PHARMACY | Facility: HOSPITAL | Age: 85
End: 2020-11-17

## 2020-11-17 DIAGNOSIS — I48.19 PERSISTENT ATRIAL FIBRILLATION (HCC): ICD-10-CM

## 2020-11-17 LAB — INR PPP: 2.7

## 2020-11-17 NOTE — PROGRESS NOTES
Anticoagulation Clinic Progress Note    Anticoagulation Summary  As of 2020    INR goal:  2.0-3.0   TTR:  60.5 % (2.2 y)   INR used for dosin.70 (2020)   Warfarin maintenance plan:  3 mg every Mon; 4.5 mg all other days   Weekly warfarin total:  30 mg   Plan last modified:  Lev Mckeon [facility] (2020)   Next INR check:  2020   Priority:  High   Target end date:      Indications    Persistent atrial fibrillation (CMS/HCC) [I48.19]             Anticoagulation Episode Summary     INR check location:      Preferred lab:      Send INR reminders to:   [facility]    Comments:        Anticoagulation Care Providers     Provider Role Specialty Phone number    Marcie Robbins MD Referring Cardiology [phone]          Clinic Interview:  No pertinent clinical findings have been reported.    INR History:  Anticoagulation Monitoring 10/28/2020 2020 2020   INR 2.4 1.80 2.70   INR Date 10/28/2020 11/10/2020 2020   INR Goal 2.0-3.0 2.0-3.0 2.0-3.0   Trend Same Same Same   Last Week Total 30 mg 30 mg 33 mg   Next Week Total 30 mg 31.5 mg 28.5 mg   Sun 4.5 mg 4.5 mg 4.5 mg   Mon 3 mg 3 mg 3 mg   Tue 4.5 mg - 3 mg (); Otherwise 4.5 mg   Wed 4.5 mg 6 mg () 4.5 mg   Thu 4.5 mg 4.5 mg 4.5 mg   Fri 4.5 mg 4.5 mg 4.5 mg   Sat 4.5 mg 4.5 mg 4.5 mg   Visit Report - - -   Some recent data might be hidden       Plan:  1. INR is therapeutic today- see above in Anticoagulation Summary.    Agnieszka Rizzo to continue their warfarin regimen- see above in Anticoagulation Summary.  2. Follow up in 2 weeks  3. Pt has agreed to only be called if INR out of range. They have been instructed to call if any changes in medications, doses, concerns, etc. Patient expresses understanding and has no further questions at this time.    Agnieszka Posey [facility]

## 2020-12-01 ENCOUNTER — ANTICOAGULATION VISIT (OUTPATIENT)
Dept: PHARMACY | Facility: HOSPITAL | Age: 85
End: 2020-12-01

## 2020-12-01 DIAGNOSIS — I48.19 PERSISTENT ATRIAL FIBRILLATION (HCC): ICD-10-CM

## 2020-12-01 LAB — INR PPP: 2.6

## 2020-12-01 NOTE — PROGRESS NOTES
Anticoagulation Clinic Progress Note    Anticoagulation Summary  As of 2020    INR goal:  2.0-3.0   TTR:  61.2 % (2.2 y)   INR used for dosin.60 (2020)   Warfarin maintenance plan:  3 mg every Mon; 4.5 mg all other days   Weekly warfarin total:  30 mg   No change documented:  Lev Mckeon RPH   Plan last modified:  Lev Mckeon [facility] (2020)   Next INR check:  12/15/2020   Priority:  High   Target end date:      Indications    Persistent atrial fibrillation (CMS/HCC) [I48.19]             Anticoagulation Episode Summary     INR check location:      Preferred lab:      Send INR reminders to:   [facility] [place]    Comments:        Anticoagulation Care Providers     Provider Role Specialty Phone number    Marcie Robbins MD Referring Cardiology [phone]          Clinic Interview:  Patient Findings     Positives:  Other complaints    Negatives:  Signs/symptoms of thrombosis, Signs/symptoms of bleeding,   Laboratory test error suspected, Change in health, Change in alcohol use,   Change in activity, Upcoming invasive procedure, Emergency department   visit, Upcoming dental procedure, Missed doses, Extra doses, Change in   medications, Change in diet/appetite, Hospital admission, Bruising    Comments:  Eneidas has 4.9 listed for today. However, pt clarified that   her test strip lot number is [id number], and this must have inadvertently been   submitted.      Clinical Outcomes     Negatives:  Major bleeding event, Thromboembolic event,   Anticoagulation-related hospital admission, Anticoagulation-related ED   visit, Anticoagulation-related fatality    Comments:  Eneidas has 4.9 listed for today. However, pt clarified that   her test strip lot number is [id number], and this must have inadvertently been   submitted.        INR History:  Anticoagulation Monitoring 2020   INR 1.80 2.70 2.60   INR Date 11/10/2020 2020 2020   INR Goal 2.0-3.0 2.0-3.0 2.0-3.0   Trend Same  Same Same   Last Week Total 30 mg 33 mg 30 mg   Next Week Total 31.5 mg 28.5 mg 30 mg   Sun 4.5 mg 4.5 mg 4.5 mg   Mon 3 mg 3 mg 3 mg   Tue - 3 mg (11/17); Otherwise 4.5 mg 4.5 mg   Wed 6 mg (11/11) 4.5 mg 4.5 mg   Thu 4.5 mg 4.5 mg 4.5 mg   Fri 4.5 mg 4.5 mg 4.5 mg   Sat 4.5 mg 4.5 mg 4.5 mg   Visit Report - - -   Some recent data might be hidden       Plan:  1. INR is Therapeutic today- see above in Anticoagulation Summary.   Will instruct Agnieszka Rizzo to Continue their warfarin regimen- see above in Anticoagulation Summary.  2. Follow up in 2 weeks  3. They have been instructed to call if any changes in medications, doses, concerns, etc. Patient expresses understanding and has no further questions at this time.    Lev Mckeon [facility]

## 2020-12-09 ENCOUNTER — TELEPHONE (OUTPATIENT)
Dept: CARDIOLOGY | Facility: CLINIC | Age: 85
End: 2020-12-09

## 2020-12-09 RX ORDER — WARFARIN SODIUM 3 MG/1
TABLET ORAL
Qty: 130 TABLET | Refills: 1 | Status: SHIPPED | OUTPATIENT
Start: 2020-12-09 | End: 2021-06-09

## 2020-12-09 NOTE — TELEPHONE ENCOUNTER
The patient called and stated that she needs her warfarin refilled. Can you please send this in for her.

## 2020-12-15 ENCOUNTER — TELEPHONE (OUTPATIENT)
Dept: CARDIOLOGY | Facility: CLINIC | Age: 85
End: 2020-12-15

## 2020-12-15 DIAGNOSIS — E78.5 DYSLIPIDEMIA: Primary | ICD-10-CM

## 2020-12-16 ENCOUNTER — ANTICOAGULATION VISIT (OUTPATIENT)
Dept: PHARMACY | Facility: HOSPITAL | Age: 85
End: 2020-12-16

## 2020-12-16 DIAGNOSIS — I48.19 PERSISTENT ATRIAL FIBRILLATION (HCC): ICD-10-CM

## 2020-12-16 LAB — INR PPP: 2.6

## 2020-12-16 NOTE — PROGRESS NOTES
Anticoagulation Clinic Progress Note    Anticoagulation Summary  As of 2020    INR goal:  2.0-3.0   TTR:  61.9 % (2.2 y)   INR used for dosin.60 (2020)   Warfarin maintenance plan:  3 mg every Mon; 4.5 mg all other days   Weekly warfarin total:  30 mg   No change documented:  Danielle Fierro   Plan last modified:  Lev Mckeon [facility]PH (2020)   Next INR check:  2020   Priority:  High   Target end date:      Indications    Persistent atrial fibrillation (CMS/HCC) [I48.19]             Anticoagulation Episode Summary     INR check location:      Preferred lab:      Send INR reminders to:   [facility] PO[facility]    Comments:  **[facility] - ok to only call when out of range**      Anticoagulation Care Providers     Provider Role Specialty Phone number    Marcie Robbins MD Referring Cardiology [phone]          Clinic Interview:  Patient Findings     Negatives:  Signs/symptoms of thrombosis, Signs/symptoms of bleeding,   Laboratory test error suspected, Change in health, Change in alcohol use,   Change in activity, Upcoming invasive procedure, Emergency department   visit, Upcoming dental procedure, Missed doses, Extra doses, Change in   medications, Change in diet/appetite, Hospital admission, Bruising, Other   complaints      Clinical Outcomes     Negatives:  Major bleeding event, Thromboembolic event,   Anticoagulation-related hospital admission, Anticoagulation-related ED   visit, Anticoagulation-related fatality        INR History:  Anticoagulation Monitoring 2020   INR 2.70 2.60 2.60   INR Date 2020   INR Goal 2.0-3.0 2.0-3.0 2.0-3.0   Trend Same Same Same   Last Week Total 33 mg 30 mg 30 mg   Next Week Total 28.5 mg 30 mg 30 mg   Sun 4.5 mg 4.5 mg 4.5 mg   Mon 3 mg 3 mg 3 mg   Tue 3 mg (); Otherwise 4.5 mg 4.5 mg 4.5 mg   Wed 4.5 mg 4.5 mg 4.5 mg   Thu 4.5 mg 4.5 mg 4.5 mg   Fri 4.5 mg 4.5 mg 4.5 mg   Sat 4.5 mg 4.5 mg  4.5 mg   Visit Report - - -   Some recent data might be hidden       Plan:  1. INR is Therapeutic today- see above in Anticoagulation Summary.   Will instruct Agnieszka Rizzo to Continue their warfarin regimen- see above in Anticoagulation Summary.  2. Follow up in 2 weeks  3. Pt has agreed to only be called if INR out of range. They have been instructed to call if any changes in medications, doses, concerns, etc. Patient expresses understanding and has no further questions at this time.    Lev Mckeon, [facility]

## 2020-12-23 RX ORDER — FERROUS SULFATE 325(65) MG
TABLET ORAL
Qty: 90 TABLET | Refills: 0 | Status: SHIPPED | OUTPATIENT
Start: 2020-12-23 | End: 2021-03-11

## 2020-12-29 ENCOUNTER — LAB (OUTPATIENT)
Dept: LAB | Facility: HOSPITAL | Age: 85
End: 2020-12-29

## 2020-12-29 ENCOUNTER — OFFICE VISIT (OUTPATIENT)
Dept: INTERNAL MEDICINE | Facility: CLINIC | Age: 85
End: 2020-12-29

## 2020-12-29 VITALS
SYSTOLIC BLOOD PRESSURE: 124 MMHG | WEIGHT: 132.2 LBS | HEIGHT: 61 IN | DIASTOLIC BLOOD PRESSURE: 50 MMHG | OXYGEN SATURATION: 98 % | BODY MASS INDEX: 24.96 KG/M2 | HEART RATE: 72 BPM

## 2020-12-29 DIAGNOSIS — D50.9 IRON DEFICIENCY ANEMIA, UNSPECIFIED IRON DEFICIENCY ANEMIA TYPE: ICD-10-CM

## 2020-12-29 DIAGNOSIS — I50.32 CHRONIC DIASTOLIC CONGESTIVE HEART FAILURE (HCC): ICD-10-CM

## 2020-12-29 DIAGNOSIS — I48.19 PERSISTENT ATRIAL FIBRILLATION (HCC): ICD-10-CM

## 2020-12-29 DIAGNOSIS — E78.5 DYSLIPIDEMIA: ICD-10-CM

## 2020-12-29 DIAGNOSIS — I10 BENIGN ESSENTIAL HYPERTENSION: Primary | ICD-10-CM

## 2020-12-29 LAB
ALBUMIN SERPL-MCNC: 4 G/DL (ref 3.5–5.2)
ALBUMIN/GLOB SERPL: 1.4 G/DL
ALP SERPL-CCNC: 109 U/L (ref 39–117)
ALT SERPL W P-5'-P-CCNC: 22 U/L (ref 1–33)
ANION GAP SERPL CALCULATED.3IONS-SCNC: 8.7 MMOL/L (ref 5–15)
AST SERPL-CCNC: 27 U/L (ref 1–32)
BASOPHILS # BLD AUTO: 0.05 10*3/MM3 (ref 0–0.2)
BASOPHILS NFR BLD AUTO: 0.7 % (ref 0–1.5)
BILIRUB SERPL-MCNC: 0.5 MG/DL (ref 0–1.2)
BUN SERPL-MCNC: 15 MG/DL (ref 8–23)
BUN/CREAT SERPL: 18.3 (ref 7–25)
CALCIUM SPEC-SCNC: 9.2 MG/DL (ref 8.2–9.6)
CHLORIDE SERPL-SCNC: 100 MMOL/L (ref 98–107)
CHOLEST SERPL-MCNC: 206 MG/DL (ref 0–200)
CO2 SERPL-SCNC: 30.3 MMOL/L (ref 22–29)
CREAT SERPL-MCNC: 0.82 MG/DL (ref 0.57–1)
DEPRECATED RDW RBC AUTO: 49.8 FL (ref 37–54)
EOSINOPHIL # BLD AUTO: 0.16 10*3/MM3 (ref 0–0.4)
EOSINOPHIL NFR BLD AUTO: 2.4 % (ref 0.3–6.2)
ERYTHROCYTE [DISTWIDTH] IN BLOOD BY AUTOMATED COUNT: 13.6 % (ref 12.3–15.4)
GFR SERPL CREATININE-BSD FRML MDRD: 65 ML/MIN/1.73
GLOBULIN UR ELPH-MCNC: 2.8 GM/DL
GLUCOSE SERPL-MCNC: 96 MG/DL (ref 65–99)
HCT VFR BLD AUTO: 35.2 % (ref 34–46.6)
HDLC SERPL-MCNC: 70 MG/DL (ref 40–60)
HGB BLD-MCNC: 11.7 G/DL (ref 12–15.9)
IMM GRANULOCYTES # BLD AUTO: 0.02 10*3/MM3 (ref 0–0.05)
IMM GRANULOCYTES NFR BLD AUTO: 0.3 % (ref 0–0.5)
INR PPP: 2.94 (ref 0.9–1.1)
LDLC SERPL CALC-MCNC: 114 MG/DL (ref 0–100)
LDLC/HDLC SERPL: 1.58 {RATIO}
LYMPHOCYTES # BLD AUTO: 1.7 10*3/MM3 (ref 0.7–3.1)
LYMPHOCYTES NFR BLD AUTO: 25.3 % (ref 19.6–45.3)
MCH RBC QN AUTO: 33.1 PG (ref 26.6–33)
MCHC RBC AUTO-ENTMCNC: 33.2 G/DL (ref 31.5–35.7)
MCV RBC AUTO: 99.4 FL (ref 79–97)
MONOCYTES # BLD AUTO: 0.6 10*3/MM3 (ref 0.1–0.9)
MONOCYTES NFR BLD AUTO: 8.9 % (ref 5–12)
NEUTROPHILS NFR BLD AUTO: 4.19 10*3/MM3 (ref 1.7–7)
NEUTROPHILS NFR BLD AUTO: 62.4 % (ref 42.7–76)
NRBC BLD AUTO-RTO: 0.1 /100 WBC (ref 0–0.2)
PLATELET # BLD AUTO: 270 10*3/MM3 (ref 140–450)
PMV BLD AUTO: 9.7 FL (ref 6–12)
POTASSIUM SERPL-SCNC: 3.6 MMOL/L (ref 3.5–5.2)
PROT SERPL-MCNC: 6.8 G/DL (ref 6–8.5)
PROTHROMBIN TIME: 30.4 SECONDS (ref 11.7–14.2)
RBC # BLD AUTO: 3.54 10*6/MM3 (ref 3.77–5.28)
SODIUM SERPL-SCNC: 139 MMOL/L (ref 136–145)
TRIGL SERPL-MCNC: 127 MG/DL (ref 0–150)
VLDLC SERPL-MCNC: 22 MG/DL (ref 5–40)
WBC # BLD AUTO: 6.72 10*3/MM3 (ref 3.4–10.8)

## 2020-12-29 PROCEDURE — 80053 COMPREHEN METABOLIC PANEL: CPT

## 2020-12-29 PROCEDURE — 85025 COMPLETE CBC W/AUTO DIFF WBC: CPT | Performed by: FAMILY MEDICINE

## 2020-12-29 PROCEDURE — 36415 COLL VENOUS BLD VENIPUNCTURE: CPT

## 2020-12-29 PROCEDURE — 99214 OFFICE O/P EST MOD 30 MIN: CPT | Performed by: FAMILY MEDICINE

## 2020-12-29 PROCEDURE — 80061 LIPID PANEL: CPT

## 2020-12-29 PROCEDURE — 85610 PROTHROMBIN TIME: CPT

## 2020-12-29 RX ORDER — TORSEMIDE 20 MG/1
20 TABLET ORAL DAILY
Qty: 90 TABLET | Refills: 1
Start: 2020-12-29 | End: 2021-04-28

## 2020-12-29 NOTE — PROGRESS NOTES
Agnieszka Rizzo is a 89 y.o. female.      Assessment/Plan   Problems Addressed this Visit        Cardiac and Vasculature    Benign essential hypertension - Primary    Relevant Medications    torsemide (DEMADEX) 20 MG tablet    Persistent atrial fibrillation (CMS/HCC)    CHF (congestive heart failure) (CMS/HCC)       Hematology and Neoplasia    Iron deficiency anemia    Relevant Orders    CBC & Differential      Diagnoses       Codes Comments    Benign essential hypertension    -  Primary ICD-10-CM: I10  ICD-9-CM: 401.1     Chronic diastolic congestive heart failure (CMS/HCC)     ICD-10-CM: I50.32  ICD-9-CM: 428.32, 428.0     Persistent atrial fibrillation (CMS/HCC)     ICD-10-CM: I48.19  ICD-9-CM: 427.31     Iron deficiency anemia, unspecified iron deficiency anemia type     ICD-10-CM: D50.9  ICD-9-CM: 280.9              Hypertension continue verapamil reduce torsemide to once daily atrial fibrillation rate controlled CHF diuretic therapy with potassium supplementation iron deficiency anemia follow-up results of CBC  Return in about 6 months (around 6/29/2021), or if symptoms worsen or fail to improve, for Recheck.      Chief Complaint   Patient presents with   • follow up to hypertension   • follow up to hyperlipidemia   • follow up to anxiety     Social History     Tobacco Use   • Smoking status: Never Smoker   • Smokeless tobacco: Never Used   • Tobacco comment: caffiene daily   Substance Use Topics   • Alcohol use: Yes     Alcohol/week: 2.0 standard drinks     Types: 1 Glasses of wine, 1 Shots of liquor per week     Comment: occasional   • Drug use: No       History of Present Illness   Patient follows up for ongoing management of chronic problems of hypertension hyperlipidemia anxiety she is taking the torsemide twice a day she has some complaints of fatigue she does not have any fast or slow heart rate blood pressures well controlled she was her self regulate she is not retaining any fluid underlying CHF  "diagnosis as well  No change in her bowel habits she did have slight reduction in hemoglobin with and is taking iron at this time she does not have any abdominal pain    The following portions of the patient's history were reviewed and updated as appropriate:PMHroutine: Social history , Allergies, Current Medications, Active Problem List and Health Maintenance    Review of Systems   Constitutional: Negative.    HENT: Negative.    Respiratory: Negative.    Cardiovascular: Negative.    Gastrointestinal: Negative.    Genitourinary: Negative.    Neurological: Negative.        Objective   Vitals:    12/29/20 0910   BP: 124/50   BP Location: Left arm   Patient Position: Sitting   Cuff Size: Adult   Pulse: 72   SpO2: 98%   Weight: 60 kg (132 lb 3.2 oz)   Height: 154.9 cm (61\")     Body mass index is 24.98 kg/m².  Physical Exam  Vitals signs and nursing note reviewed.   Constitutional:       Appearance: Normal appearance. She is well-developed. She is not diaphoretic.   HENT:      Head: Normocephalic and atraumatic.      Right Ear: External ear normal.      Left Ear: External ear normal.   Eyes:      General: Lids are normal. No scleral icterus.     Extraocular Movements: Extraocular movements intact.      Conjunctiva/sclera: Conjunctivae normal.   Neck:      Musculoskeletal: Normal range of motion.      Thyroid: No thyroid mass or thyromegaly.      Vascular: No carotid bruit or JVD.   Cardiovascular:      Rate and Rhythm: Normal rate and regular rhythm.      Pulses: Normal pulses.           Radial pulses are 2+ on the right side and 2+ on the left side.      Heart sounds: Normal heart sounds. No murmur.   Pulmonary:      Effort: Pulmonary effort is normal. No respiratory distress.      Breath sounds: Normal breath sounds.   Abdominal:      Palpations: Abdomen is soft.   Musculoskeletal:      Right lower leg: No edema.      Left lower leg: No edema.   Skin:     General: Skin is warm and dry.      Coloration: Skin is not " pale.      Findings: No erythema or rash.   Neurological:      General: No focal deficit present.      Mental Status: She is alert and oriented to person, place, and time.      Sensory: No sensory deficit.      Deep Tendon Reflexes: Reflexes are normal and symmetric.   Psychiatric:         Mood and Affect: Mood normal.         Behavior: Behavior normal. Behavior is cooperative.         Thought Content: Thought content normal.         Judgment: Judgment normal.       Reviewed Data:  Anticoagulation Visit on 12/16/2020   Component Date Value Ref Range Status   • INR 12/16/2020 2.60   Final   Anticoagulation Visit on 12/01/2020   Component Date Value Ref Range Status   • INR 12/01/2020 2.60   Final

## 2020-12-30 ENCOUNTER — ANTICOAGULATION VISIT (OUTPATIENT)
Dept: PHARMACY | Facility: HOSPITAL | Age: 85
End: 2020-12-30

## 2020-12-30 DIAGNOSIS — I48.19 PERSISTENT ATRIAL FIBRILLATION (HCC): ICD-10-CM

## 2020-12-30 NOTE — PROGRESS NOTES
Anticoagulation Clinic Progress Note    Anticoagulation Summary  As of 2020    INR goal:  2.0-3.0   TTR:  62.5 % (2.3 y)   INR used for dosin.94 (2020)   Warfarin maintenance plan:  3 mg every Mon; 4.5 mg all other days   Weekly warfarin total:  30 mg   Plan last modified:  Lev Mckeon [facility] (2020)   Next INR check:  2021   Priority:  High   Target end date:      Indications    Persistent atrial fibrillation (CMS/HCC) [I48.19]             Anticoagulation Episode Summary     INR check location:      Preferred lab:      Send INR reminders [facility]:   [facility]    Comments:  **[facility] - ok to only call when out of range**      Anticoagulation Care Providers     Provider Role Specialty Phone number    Marcie Robbins MD Referring Cardiology [phone]          Clinic Interview:      INR History:  Anticoagulation Monitoring 2020   INR 2.60 2.60 2.94   INR Date 2020   INR Goal 2.0-3.0 2.0-3.0 2.0-3.0   Trend Same Same Same   Last Week Total 30 mg 30 mg 30 mg   Next Week Total 30 mg 30 mg 30 mg   Sun 4.5 mg 4.5 mg 4.5 mg   Mon 3 mg 3 mg 3 mg   Tue 4.5 mg 4.5 mg 4.5 mg   Wed 4.5 mg 4.5 mg 4.5 mg   Thu 4.5 mg 4.5 mg 4.5 mg   Fri 4.5 mg 4.5 mg 4.5 mg   Sat 4.5 mg 4.5 mg 4.5 mg   Visit Report - - -   Some recent data might be hidden       Plan:  1. INR is Therapeutic  see above in Anticoagulation Summary.   Will instruct Agnieszka Rizzo to Continue their warfarin regimen- see above in Anticoagulation Summary.  2. Follow up in 2 weeks  3. Spoke with pt today.  Will check on [employer] home test INR goal range. They have been instructed to call if any changes in medications, doses, concerns, etc. Patient expresses understanding and has no further questions at this time.    Lina Morris [facility]

## 2021-01-04 ENCOUNTER — TELEPHONE (OUTPATIENT)
Dept: CARDIOLOGY | Facility: CLINIC | Age: 86
End: 2021-01-04

## 2021-01-04 NOTE — TELEPHONE ENCOUNTER
Call patient and reviewed her cholesterol results.  At her age, I do not recommend any adjustments to her atorvastatin.  She requested a copy of her recent labs to be mailed.    Lea- please mail copy I printed to patient

## 2021-01-04 NOTE — TELEPHONE ENCOUNTER
----- Message from PJ West sent at 12/31/2020  2:18 PM EST -----  Not sure how this came to me. It says I ordered labs on this [place] patient on the 29th but I wasn't even working that day. Didn't know if you wanted to address? You are on her care team now right?  ----- Message -----  From: Lab, Background User  Sent: 12/29/2020  12:15 PM EST  To: PJ West

## 2021-01-14 ENCOUNTER — ANTICOAGULATION VISIT (OUTPATIENT)
Dept: PHARMACY | Facility: HOSPITAL | Age: 86
End: 2021-01-14

## 2021-01-14 DIAGNOSIS — I48.19 PERSISTENT ATRIAL FIBRILLATION (HCC): ICD-10-CM

## 2021-01-14 LAB — INR PPP: 2.8

## 2021-01-14 NOTE — PROGRESS NOTES
Anticoagulation Clinic Progress Note    Anticoagulation Summary  As of 2021    INR goal:  2.0-3.0   TTR:  63.2 % (2.3 y)   INR used for dosin.80 (2021)   Warfarin maintenance plan:  3 mg every Mon; 4.5 mg all other days   Weekly warfarin total:  30 mg   No change documented:  Danielle Fierro   Plan last modified:  Lev Mckeon [facility] (2020)   Next INR check:  2021   Priority:  High   Target end date:      Indications    Persistent atrial fibrillation (CMS/HCC) [I48.19]             Anticoagulation Episode Summary     INR check location:      Preferred lab:      [facility]ers [facility]:   [facility]    Comments:  **Acelis - ok to only call when out of range**      Anticoagulation Care Providers     Provider Role Specialty Phone number    Marcie Robbins MD Referring Cardiology [phone]          Clinic Interview:  No pertinent clinical findings have been reported.    INR History:  Anticoagulation Monitoring 2020   INR 2.60 2.94 2.80   INR Date 2020   INR Goal 2.0-3.0 2.0-3.0 2.0-3.0   Trend Same Same Same   Last Week Total 30 mg 30 mg 30 mg   Next Week Total 30 mg 30 mg 30 mg   Sun 4.5 mg 4.5 mg 4.5 mg   Mon 3 mg 3 mg 3 mg   Tue 4.5 mg 4.5 mg 4.5 mg   Wed 4.5 mg 4.5 mg 4.5 mg   Thu 4.5 mg 4.5 mg 4.5 mg   Fri 4.5 mg 4.5 mg 4.5 mg   Sat 4.5 mg 4.5 mg 4.5 mg   Visit Report - - -   Some recent data might be hidden       Plan:  1. INR is therapeutic today- see above in Anticoagulation Summary.    Agnieszka Rizzo to continue their warfarin regimen- see above in Anticoagulation Summary.  2. Follow up in 2 weeks  3. Pt has agreed to only be called if INR out of range. They have been instructed to call if any changes in medications, doses, concerns, etc. Patient expresses understanding and has no further questions at this time.    Danielle Fierro

## 2021-01-27 ENCOUNTER — ANTICOAGULATION VISIT (OUTPATIENT)
Dept: PHARMACY | Facility: HOSPITAL | Age: 86
End: 2021-01-27

## 2021-01-27 DIAGNOSIS — I48.19 PERSISTENT ATRIAL FIBRILLATION (HCC): ICD-10-CM

## 2021-01-27 LAB — INR PPP: 1.9

## 2021-01-27 NOTE — PROGRESS NOTES
Anticoagulation Clinic Progress Note    Anticoagulation Summary  As of 2021    INR goal:  2.0-3.0   TTR:  63.6 % (2.4 y)   INR used for dosin.90 (2021)   Warfarin maintenance plan:  3 mg every Mon; 4.5 mg all other days   Weekly warfarin total:  30 mg   Plan last modified:  Marlene Herrmann, [facility] (2021)   Next INR check:  2/3/2021   Priority:  High   Target end date:      Indications    Persistent atrial fibrillation (CMS/HCC) [I48.19]             Anticoagulation Episode Summary     INR check location:      Preferred lab:      Send INR reminders to:   [facility] CLINICAL POOL    Comments:  **Acelis - ok to only call when out of range**      Anticoagulation Care Providers     Provider Role Specialty Phone number    Marcie Robbins MD Referring Cardiology [phone]          Clinic Interview:  Patient Findings     Positives:  Change in diet/appetite    Negatives:  Signs/symptoms of thrombosis, Signs/symptoms of bleeding,   Laboratory test error suspected, Change in health, Change in alcohol use,   Change in activity, Upcoming invasive procedure, Emergency department   visit, Upcoming dental procedure, Missed doses, Extra doses, Change in   medications, Hospital admission, Bruising, Other complaints    Comments:  Eating more fresh berries       Clinical Outcomes     Negatives:  Major bleeding event, Thromboembolic event,   Anticoagulation-related hospital admission, Anticoagulation-related ED   visit, Anticoagulation-related fatality    Comments:  Eating more fresh berries         INR History:  Anticoagulation Monitoring 2020   INR 2.94 2.80 1.90   INR Date 2020   INR Goal 2.0-3.0 2.0-3.0 2.0-3.0   Trend Same Same Same   Last Week Total 30 mg 30 mg 30 mg   Next Week Total 30 mg 30 mg 31.5 mg   Sun 4.5 mg 4.5 mg 4.5 mg   Mon 3 mg 3 mg 4.5 mg ()   Tue 4.5 mg 4.5 mg 4.5 mg   Wed 4.5 mg 4.5 mg 4.5 mg   Thu 4.5 mg 4.5 mg 4.5 mg   Fri 4.5 mg  4.5 mg 4.5 mg   Sat 4.5 mg 4.5 mg 4.5 mg   Visit Report - - -   Some recent data might be hidden       Plan:  1. INR is Subtherapeutic today- see above in Anticoagulation Summary.   Will instruct Agnieszka Rizzo to change their warfarin regimen- see above in Anticoagulation Summary.  2. Follow up in 1 week per patient request   3. Pt has agreed to only be called if INR out of range. They have been instructed to call if any changes in medications, doses, concerns, etc. Patient expresses understanding and has no further questions at this time.    Marlene Herrmann, [facility]

## 2021-01-28 RX ORDER — ATORVASTATIN CALCIUM 40 MG/1
TABLET, FILM COATED ORAL
Qty: 90 TABLET | Refills: 3 | Status: SHIPPED | OUTPATIENT
Start: 2021-01-28 | End: 2021-08-24 | Stop reason: SDUPTHER

## 2021-02-03 ENCOUNTER — ANTICOAGULATION VISIT (OUTPATIENT)
Dept: PHARMACY | Facility: HOSPITAL | Age: 86
End: 2021-02-03

## 2021-02-03 DIAGNOSIS — I48.19 PERSISTENT ATRIAL FIBRILLATION (HCC): ICD-10-CM

## 2021-02-03 LAB — INR PPP: 2.1

## 2021-02-03 NOTE — TELEPHONE ENCOUNTER
----- Message from Jorge Wheeler MD sent at 2/22/2019 12:50 PM EST -----  Negative  O/V in 4-6 weeks   GEN: Well Appearing, Nontoxic, NAD  HEENT: NC/AT, Symm Facies. PERRL, EOMI. Mild bilateral horizontal nystagmus. MMM, posterior pharynx clear  CV: No JVD/Bruits or stridor;  +S1S2, RRR w/o m/g/r  RESP: CTAB w/o w/r/r  ABD: Soft, nt/nd, +BS. No guarding/rebound. No RUQ tender, no CVAT  EXT/MSK: No lower extremity edema or calf tenderness. WWP, palpable pulses. FROMx4  SKIN: No erythema, lesions or rash  Neuro: Grossly intact, AOX3 with normal speech, CN II-XII intact; Sensation intact, motor 5/5 throughout. Gait normal

## 2021-02-03 NOTE — PROGRESS NOTES
Anticoagulation Clinic Progress Note    Anticoagulation Summary  As of 2/3/2021    INR goal:  2.0-3.0   TTR:  63.5 % (2.4 y)   INR used for dosin.10 (2/3/2021)   Warfarin maintenance plan:  3 mg every Mon; 4.5 mg all other days   Weekly warfarin total:  30 mg   No change documented:  Lev Mckeon RP   Plan last modified:  Marlene Herrmann [facility] (2021)   Next INR check:  2/10/2021   Priority:  High   Target end date:      Indications    Persistent atrial fibrillation (CMS/HCC) [I48.19]             Anticoagulation Episode Summary     INR check location:      Preferred lab:      [facility]   [facility]    Comments:  **Acelis - ok to only call when out of range**      Anticoagulation Care Providers     Provider Role Specialty Phone number    Marcie Robbins MD Referring Cardiology [phone]          Clinic Interview:  Patient Findings     Positives:  Change in medications    Negatives:  Signs/symptoms of thrombosis, Signs/symptoms of bleeding,   Laboratory test error suspected, Change in health, Change in alcohol use,   Change in activity, Upcoming invasive procedure, Emergency department   visit, Upcoming dental procedure, Missed doses, Extra doses, Change in   diet/appetite, Hospital admission, Bruising, Other complaints    Comments:  Received 1st COVID vaccine 21.       Clinical Outcomes     Negatives:  Major bleeding event, Thromboembolic event,   Anticoagulation-related hospital admission, Anticoagulation-related ED   visit, Anticoagulation-related fatality    Comments:  Received 1st COVID vaccine 21.         INR History:  Anticoagulation Monitoring 2021 2021 2/3/2021   INR 2.80 1.90 2.10   INR Date 2021 2021 2/3/2021   INR Goal 2.0-3.0 2.0-3.0 2.0-3.0   Trend Same Same Same   Last Week Total 30 mg 30 mg 31.5 mg   Next Week Total 30 mg 31.5 mg 30 mg   Sun 4.5 mg 4.5 mg 4.5 mg   Mon 3 mg 4.5 mg () 3 mg   Tue 4.5 mg 4.5 mg 4.5 mg   Wed  4.5 mg 4.5 mg 4.5 mg   Thu 4.5 mg 4.5 mg 4.5 mg   Fri 4.5 mg 4.5 mg 4.5 mg   Sat 4.5 mg 4.5 mg 4.5 mg   Visit Report - - -   Some recent data might be hidden       Plan:  1. INR is Therapeutic today- see above in Anticoagulation Summary.   Will instruct Agnieszka Rizzo to Continue their warfarin regimen- see above in Anticoagulation Summary.  2. Follow up in 1 week  3. They have been instructed to call if any changes in medications, doses, concerns, etc. Patient expresses understanding and has no further questions at this time.    Lev Mckeon [facility]

## 2021-02-17 LAB — INR PPP: 2.2

## 2021-02-18 ENCOUNTER — ANTICOAGULATION VISIT (OUTPATIENT)
Dept: PHARMACY | Facility: HOSPITAL | Age: 86
End: 2021-02-18

## 2021-02-18 DIAGNOSIS — I48.19 PERSISTENT ATRIAL FIBRILLATION (HCC): ICD-10-CM

## 2021-02-18 NOTE — PROGRESS NOTES
Anticoagulation Clinic Progress Note    Anticoagulation Summary  As of 2021    INR goal:  2.0-3.0   TTR:  64.1 % (2.4 y)   INR used for dosin.20 (2021)   Warfarin maintenance plan:  3 mg every Mon; 4.5 mg all other days   Weekly warfarin total:  30 mg   No change documented:  Sara Santana   Plan last modified:  Marlene Herrmann, [facility] (2021)   Next INR check:  3/3/2021   Priority:  High   Target end date:      Indications    Persistent atrial fibrillation (CMS/HCC) [I48.19]             Anticoagulation Episode Summary     INR check location:      Preferred lab:      [facility]    Comments:  **Acelis - ok to only call when out of range**      Anticoagulation Care Providers     Provider Role Specialty Phone number    Marcie Robbins MD Referring Cardiology [phone]          Clinic Interview:  No pertinent clinical findings have been reported.    INR History:  Anticoagulation Monitoring 2021 2/3/2021 2021   INR 1.90 2.10 2.20   INR Date 2021 2/3/2021 2021   INR Goal 2.0-3.0 2.0-3.0 2.0-3.0   Trend Same Same Same   Last Week Total 30 mg 31.5 mg 30 mg   Next Week Total 31.5 mg 30 mg 30 mg   Sun 4.5 mg 4.5 mg 4.5 mg   Mon 4.5 mg () 3 mg 3 mg   Tue 4.5 mg 4.5 mg 4.5 mg   Wed 4.5 mg 4.5 mg 4.5 mg   Thu 4.5 mg 4.5 mg 4.5 mg   Fri 4.5 mg 4.5 mg 4.5 mg   Sat 4.5 mg 4.5 mg 4.5 mg   Visit Report - - -   Some recent data might be hidden       Plan:  1. INR is therapeutic today- see above in Anticoagulation Summary.    Agnieszka Dunbaran to continue their warfarin regimen- see above in Anticoagulation Summary.  2. Follow up in 2 weeks  3. Pt has agreed to only be called if INR out of range. They have been instructed to call if any changes in medications, doses, concerns, etc. Patient expresses understanding and has no further questions at this time.    Sara Santana

## 2021-03-03 RX ORDER — VERAPAMIL HYDROCHLORIDE 360 MG/1
CAPSULE, DELAYED RELEASE PELLETS ORAL
Qty: 90 CAPSULE | Refills: 2 | Status: SHIPPED | OUTPATIENT
Start: 2021-03-03 | End: 2021-03-04

## 2021-03-04 ENCOUNTER — ANTICOAGULATION VISIT (OUTPATIENT)
Dept: PHARMACY | Facility: HOSPITAL | Age: 86
End: 2021-03-04

## 2021-03-04 ENCOUNTER — APPOINTMENT (OUTPATIENT)
Dept: CT IMAGING | Facility: HOSPITAL | Age: 86
End: 2021-03-04

## 2021-03-04 ENCOUNTER — HOSPITAL ENCOUNTER (EMERGENCY)
Facility: HOSPITAL | Age: 86
Discharge: HOME | End: 2021-03-05
Attending: EMERGENCY MEDICINE | Admitting: EMERGENCY MEDICINE

## 2021-03-04 DIAGNOSIS — Z79.01 CHRONIC ANTICOAGULATION: ICD-10-CM

## 2021-03-04 DIAGNOSIS — L03.211 FACIAL CELLULITIS: Primary | ICD-10-CM

## 2021-03-04 DIAGNOSIS — I48.19 PERSISTENT ATRIAL FIBRILLATION (HCC): ICD-10-CM

## 2021-03-04 DIAGNOSIS — K04.7 PERIAPICAL ABSCESS: ICD-10-CM

## 2021-03-04 LAB
ALBUMIN SERPL-MCNC: 4.4 G/DL (ref 3.5–5.2)
ALBUMIN/GLOB SERPL: 1.5 G/DL
ALP SERPL-CCNC: 108 U/L (ref 39–117)
ALT SERPL W P-5'-P-CCNC: 34 U/L (ref 1–33)
ANION GAP SERPL CALCULATED.3IONS-SCNC: 11.8 MMOL/L (ref 5–15)
AST SERPL-CCNC: 36 U/L (ref 1–32)
BASOPHILS # BLD AUTO: 0.12 10*3/MM3 (ref 0–0.2)
BASOPHILS NFR BLD AUTO: 0.9 % (ref 0–1.5)
BILIRUB SERPL-MCNC: 0.9 MG/DL (ref 0–1.2)
BUN SERPL-MCNC: 15 MG/DL (ref 8–23)
BUN/CREAT SERPL: 17 (ref 7–25)
CALCIUM SPEC-SCNC: 9.6 MG/DL (ref 8.2–9.6)
CHLORIDE SERPL-SCNC: 100 MMOL/L (ref 98–107)
CO2 SERPL-SCNC: 27.2 MMOL/L (ref 22–29)
CREAT SERPL-MCNC: 0.88 MG/DL (ref 0.57–1)
DEPRECATED RDW RBC AUTO: 50.1 FL (ref 37–54)
EOSINOPHIL # BLD AUTO: 0.16 10*3/MM3 (ref 0–0.4)
EOSINOPHIL NFR BLD AUTO: 1.2 % (ref 0.3–6.2)
ERYTHROCYTE [DISTWIDTH] IN BLOOD BY AUTOMATED COUNT: 14.2 % (ref 12.3–15.4)
GFR SERPL CREATININE-BSD FRML MDRD: 60 ML/MIN/1.73
GLOBULIN UR ELPH-MCNC: 2.9 GM/DL
GLUCOSE SERPL-MCNC: 124 MG/DL (ref 65–99)
HCT VFR BLD AUTO: 40.2 % (ref 34–46.6)
HGB BLD-MCNC: 13.3 G/DL (ref 12–15.9)
IMM GRANULOCYTES # BLD AUTO: 0.06 10*3/MM3 (ref 0–0.05)
IMM GRANULOCYTES NFR BLD AUTO: 0.4 % (ref 0–0.5)
INR PPP: 2.11 (ref 0.9–1.1)
INR PPP: 2.2
LYMPHOCYTES # BLD AUTO: 2.22 10*3/MM3 (ref 0.7–3.1)
LYMPHOCYTES NFR BLD AUTO: 16.4 % (ref 19.6–45.3)
MCH RBC QN AUTO: 32.2 PG (ref 26.6–33)
MCHC RBC AUTO-ENTMCNC: 33.1 G/DL (ref 31.5–35.7)
MCV RBC AUTO: 97.3 FL (ref 79–97)
MONOCYTES # BLD AUTO: 1.56 10*3/MM3 (ref 0.1–0.9)
MONOCYTES NFR BLD AUTO: 11.5 % (ref 5–12)
NEUTROPHILS NFR BLD AUTO: 69.6 % (ref 42.7–76)
NEUTROPHILS NFR BLD AUTO: 9.41 10*3/MM3 (ref 1.7–7)
NRBC BLD AUTO-RTO: 1.6 /100 WBC (ref 0–0.2)
PLATELET # BLD AUTO: 225 10*3/MM3 (ref 140–450)
PMV BLD AUTO: 10.6 FL (ref 6–12)
POTASSIUM SERPL-SCNC: 3.8 MMOL/L (ref 3.5–5.2)
PROT SERPL-MCNC: 7.3 G/DL (ref 6–8.5)
PROTHROMBIN TIME: 23.4 SECONDS (ref 11.7–14.2)
RBC # BLD AUTO: 4.13 10*6/MM3 (ref 3.77–5.28)
SODIUM SERPL-SCNC: 139 MMOL/L (ref 136–145)
WBC # BLD AUTO: 13.53 10*3/MM3 (ref 3.4–10.8)

## 2021-03-04 PROCEDURE — 96365 THER/PROPH/DIAG IV INF INIT: CPT

## 2021-03-04 PROCEDURE — 85610 PROTHROMBIN TIME: CPT | Performed by: EMERGENCY MEDICINE

## 2021-03-04 PROCEDURE — 80053 COMPREHEN METABOLIC PANEL: CPT | Performed by: EMERGENCY MEDICINE

## 2021-03-04 PROCEDURE — 85025 COMPLETE CBC W/AUTO DIFF WBC: CPT | Performed by: EMERGENCY MEDICINE

## 2021-03-04 PROCEDURE — 99283 EMERGENCY DEPT VISIT LOW MDM: CPT

## 2021-03-04 PROCEDURE — 70486 CT MAXILLOFACIAL W/O DYE: CPT

## 2021-03-04 RX ORDER — VERAPAMIL HYDROCHLORIDE 360 MG/1
CAPSULE, DELAYED RELEASE PELLETS ORAL
Qty: 90 CAPSULE | Refills: 3 | Status: SHIPPED | OUTPATIENT
Start: 2021-03-04 | End: 2021-08-24 | Stop reason: SDUPTHER

## 2021-03-04 RX ORDER — SODIUM CHLORIDE 0.9 % (FLUSH) 0.9 %
10 SYRINGE (ML) INJECTION AS NEEDED
Status: DISCONTINUED | OUTPATIENT
Start: 2021-03-04 | End: 2021-03-05 | Stop reason: HOSPADM

## 2021-03-04 RX ORDER — CLINDAMYCIN PHOSPHATE 600 MG/50ML
600 INJECTION INTRAVENOUS ONCE
Status: COMPLETED | OUTPATIENT
Start: 2021-03-04 | End: 2021-03-04

## 2021-03-04 RX ADMIN — CLINDAMYCIN PHOSPHATE 600 MG: 600 INJECTION, SOLUTION INTRAVENOUS at 22:47

## 2021-03-04 NOTE — PROGRESS NOTES
Anticoagulation Clinic Progress Note    Anticoagulation Summary  As of 3/4/2021    INR goal:  2.0-3.0   TTR:  64.6 % (2.4 y)   INR used for dosin.20 (3/3/2021)   Warfarin maintenance plan:  3 mg every Mon; 4.5 mg all other days   Weekly warfarin total:  30 mg   No change documented:  Danielle Fierro   Plan last modified:  Marlene Herrmann [facility] (2021)   Next INR check:  3/18/2021   Priority:  High   Target end date:      Indications    Persistent atrial fibrillation (CMS/HCC) [I48.19]             Anticoagulation Episode Summary     INR check location:      Preferred lab:      Send INR reminders to:  [facility]    Comments:  **Acelis - ok to only call when out of range**      Anticoagulation Care Providers     Provider Role Specialty Phone number    Marcie Robbins MD Referring Cardiology [phone]          Clinic Interview:  No pertinent clinical findings have been reported.    INR History:  Anticoagulation Monitoring 2/3/2021 2021 3/4/2021   INR 2.10 2.20 2.20   INR Date 2/3/2021 2021 3/3/2021   INR Goal 2.0-3.0 2.0-3.0 2.0-3.0   Trend Same Same Same   Last Week Total 31.5 mg 30 mg 30 mg   Next Week Total 30 mg 30 mg 30 mg   Sun 4.5 mg 4.5 mg 4.5 mg   Mon 3 mg 3 mg 3 mg   Tue 4.5 mg 4.5 mg 4.5 mg   Wed 4.5 mg 4.5 mg 4.5 mg   Thu 4.5 mg 4.5 mg 4.5 mg   Fri 4.5 mg 4.5 mg 4.5 mg   Sat 4.5 mg 4.5 mg 4.5 mg   Visit Report - - -   Some recent data might be hidden       Plan:  1. INR is therapeutic today- see above in Anticoagulation Summary.    Agnieszka Rizzo to continue their warfarin regimen- see above in Anticoagulation Summary.  2. Follow up in 2 weeks  3. Pt has agreed to only be called if INR out of range. They have been instructed to call if any changes in medications, doses, concerns, etc. Patient expresses understanding and has no further questions at this time.    Danielle Fierro

## 2021-03-05 VITALS
RESPIRATION RATE: 16 BRPM | TEMPERATURE: 100 F | OXYGEN SATURATION: 98 % | DIASTOLIC BLOOD PRESSURE: 87 MMHG | HEIGHT: 62 IN | SYSTOLIC BLOOD PRESSURE: 174 MMHG | BODY MASS INDEX: 24.18 KG/M2 | HEART RATE: 89 BPM

## 2021-03-05 LAB — QT INTERVAL: 412 MS

## 2021-03-05 PROCEDURE — 93005 ELECTROCARDIOGRAM TRACING: CPT | Performed by: EMERGENCY MEDICINE

## 2021-03-05 PROCEDURE — 93010 ELECTROCARDIOGRAM REPORT: CPT | Performed by: INTERNAL MEDICINE

## 2021-03-05 NOTE — ED TRIAGE NOTES
Pt reports left sided facial swelling that began last night, was seen at dentist today and began oral abx. States she felt her tongue begin swelling tonight, able to speak full sentences and no resp distress noted at this time.  Pt arrives aaox4, abc's intact, ambulatory with steady gait. Pt placed in mask at triage. This RN also wearing a mask.

## 2021-03-05 NOTE — ED PROVIDER NOTES
EMERGENCY DEPARTMENT ENCOUNTER    Room Number:  24/24  Date seen:  3/5/2021  PCP: Matt Rose MD  Historian: Patient      HPI:  Chief Complaint: Left facial swelling  A complete HPI/ROS/PMH/PSH/SH/FH are unobtainable due to: Nothing  Context: Agnieszka Rizzo is a 89 y.o. female who presents to the ED c/o left facial swelling.  Patient reports that she believes she has a infection related to tooth #10.  She has an appointment with an endodontist tomorrow for a root canal.  She reports mild pain that was partially alleviated by pain pill prior to coming in.  She developed left facial swelling yesterday, which has progressed today and she is also developed some mild swelling of her tongue.  She denies any difficulty breathing or swallowing.  She had denied fever or chills but was noted to have a low-grade temperature here on arrival.  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  She takes a daily probiotic.            PAST MEDICAL HISTORY  Active Ambulatory Problems     Diagnosis Date Noted   • Benign essential hypertension 12/01/2016   • Chronic coronary artery disease 12/01/2016   • Mixed hyperlipidemia 12/01/2016   • Mitral valve insufficiency 12/01/2016   • Ventricular premature beats 12/01/2016   • Ventricular tachycardia (CMS/[facility]) 12/01/2016   • Persistent atrial fibrillation (CMS/[facility]) 12/01/2016   • Acid-fast bacteria present 01/09/2017   • DNR (do not resuscitate) 03/12/2017   • Chronic diastolic CHF (congestive heart failure) (CMS/[facility]) 03/12/2017   • CHF (congestive heart failure) (CMS/[facility]) 03/22/2017   • Asymptomatic bacteriuria 04/03/2017   • Iron deficiency anemia 04/04/2017   • Bronchiectasis (CMS/[facility]) 04/17/2017   • KINA (mycobacterium avium-intracellulare) (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/09/2017   • Atrial fibrillation with rapid ventricular response (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/09/2017   • Anxiety 06/20/2017   • Exposure to hepatitis A 04/20/2018   • Medicare annual wellness visit, subsequent  04/18/2019   • Abdominal pain 09/23/2019   • Herpes infection 11/19/2019   • Moderate asthma with acute exacerbation 12/02/2019   • Bronchitis, acute, with bronchospasm 12/16/2019   • Abnormal EKG 12/16/2019   • Acute bronchitis due to parainfluenza virus 12/16/2019   • COPD with acute exacerbation  12/16/2019     Resolved Ambulatory Problems     Diagnosis Date Noted   • Pneumothorax of right lung after biopsy 11/12/2016   • Pulmonary aspergillosis (CMS/[facility]) 11/16/2016   • Heart failure, diastolic, with acute decompensation (CMS/[facility]) 12/01/2016   • Pneumonia 01/01/2017   • Pneumonia with the fungal infection aspergillosis (CMS/[facility]) 01/06/2017   • Acute respiratory failure with hypoxia (CMS/HCC) 01/09/2017   • Hyponatremia 02/08/2017   • C. difficile colitis 03/11/2017   • Sepsis (CMS/[facility]) 03/12/2017   • Pancolitis (CMS/[facility]) 04/02/2017   • Hypokalemia 04/04/2017   • Pneumonia of both lungs due to infectious organism 05/31/2017   • UTI (urinary tract infection) 12/19/2019     Past Medical History:   Diagnosis Date   • Aspergillus (CMS/[facility])    • Asthma    • Atrial fibrillation (CMS/[facility])    • Atrial flutter (CMS/[facility])    • C. 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[facility])    • Dyspnea on exertion    • Fall    • Hyperlipidemia    • Hypertension    • Hypoxia    • Infectious viral hepatitis    • Left shoulder pain    • Leg swelling    • Lesion of lung    • Mild tricuspid regurgitation    • MR (mitral regurgitation)    • MVP (mitral valve prolapse)    • Permanent atrial fibrillation (CMS/[facility])    • Pneumothorax    • SOB (shortness of breath)    • Wheeze          PAST SURGICAL HISTORY  Past Surgical History:   Procedure Laterality Date   • BRONCHOSCOPY N/A 11/12/2016    Procedure: BRONCHOSCOPY WITH FLUORO, BRUSHINGS, BAL, AND BIOPSIES;  Surgeon: Rogelio Tucker MD;  Location: [facility] ENDOSCOPY;   Service:    • BRONCHOSCOPY Bilateral 6/3/2017    Procedure: BRONCHOSCOPY with BAL ;  Surgeon: Sung King MD;  Location: [facility] ENDOSCOPY;  Service:    • BRONCHOSCOPY N/A 12/17/2019    Procedure: BRONCHOSCOPY WITH WASHINGS;  Surgeon: Rogelio Tucker MD;  Location: [facility] ENDOSCOPY;  Service: Pulmonary   • CATARACT EXTRACTION EXTRACAPSULAR W/ INTRAOCULAR LENS IMPLANTATION     • COLONOSCOPY      2013   • D&C WITH SUCTION     • HYSTERECTOMY     • KNEE ARTHROSCOPY Left          FAMILY HISTORY  Family History   Problem Relation Age of Onset   • Hypertension Mother    • Stroke Mother    • Hypertension Father    • Cancer Son    • Cancer Brother          SOCIAL HISTORY  Social History     Socioeconomic History   • Marital status:      Spouse name: Not on file   • Number of children: Not on file   • Years of education: Not on file   • Highest education level: Not on file   Tobacco Use   • Smoking status: Never Smoker   • Smokeless tobacco: Never Used   • Tobacco comment: caffiene daily   Substance and Sexual Activity   • Alcohol use: Yes     Alcohol/week: 2.0 standard drinks     Types: 1 Glasses of wine, 1 Shots of liquor per week     Comment: occasional   • Drug use: No   • Sexual activity: Yes     Partners: Male         ALLERGIES  Amlodipine besylate, Aspirin, Bactrim [sulfamethoxazole-trimethoprim], Erythromycin, Levaquin [levofloxacin], Macrobid [nitrofurantoin], and Ramipril        REVIEW OF SYSTEMS  Review of Systems   Review of all 14 systems is negative other than stated in the HPI above.      PHYSICAL EXAM  ED Triage Vitals   Temp Heart Rate Resp BP SpO2   03/04/21 2203 03/04/21 2203 03/04/21 2203 03/04/21 2204 03/04/21 2203   (!) 100.9 °F (38.3 °C) 108 17 174/87 94 %      Temp src Heart Rate Source Patient Position BP Location FiO2 (%)   03/04/21 2203 -- -- -- --   Tympanic             GENERAL: Awake and alert, no acute distress  HENT: nares patent.  There is mild swelling of the left side of the face over  the maxillary region.  There is no appreciable tongue or lip swelling.  The posterior oropharynx is clear.  Dentition appears intact and there is no appreciable gingival swelling or abscess.  There is mild point tenderness over the left maxillary sinus.  EYES: no scleral icterus  CV: regular rhythm, normal rate  RESPIRATORY: normal effort  ABDOMEN: soft  MUSCULOSKELETAL: no deformity  NEURO: alert, moves all extremities, follows commands  PSYCH:  calm, cooperative  SKIN: warm, dry    Vital signs and nursing notes reviewed.          LAB RESULTS  Recent Results (from the past 24 hour(s))   Protime-INR    Collection Time: 03/04/21 10:44 PM    Specimen: Blood   Result Value Ref Range    Protime 23.4 (H) 11.7 - 14.2 Seconds    INR 2.11 (H) 0.90 - 1.10   Comprehensive Metabolic Panel    Collection Time: 03/04/21 10:44 PM    Specimen: Blood   Result Value Ref Range    Glucose 124 (H) 65 - 99 mg/dL    BUN 15 8 - 23 mg/dL    Creatinine 0.88 0.57 - 1.00 mg/dL    Sodium 139 136 - 145 mmol/L    Potassium 3.8 3.5 - 5.2 mmol/L    Chloride 100 98 - 107 mmol/L    CO2 27.2 22.0 - 29.0 mmol/L    Calcium 9.6 8.2 - 9.6 mg/dL    Total Protein 7.3 6.0 - 8.5 g/dL    Albumin 4.40 3.50 - 5.20 g/dL    ALT (SGPT) 34 (H) 1 - 33 U/L    AST (SGOT) 36 (H) 1 - 32 U/L    Alkaline Phosphatase 108 39 - 117 U/L    Total Bilirubin 0.9 0.0 - 1.2 mg/dL    eGFR Non African Amer 60 (L) >60 mL/min/1.73    Globulin 2.9 gm/dL    A/G Ratio 1.5 g/dL    BUN/Creatinine Ratio 17.0 7.0 - 25.0    Anion Gap 11.8 5.0 - 15.0 mmol/L   CBC Auto Differential    Collection Time: 03/04/21 10:44 PM    Specimen: Blood   Result Value Ref Range    WBC 13.53 (H) 3.40 - 10.80 10*3/mm3    RBC 4.13 3.77 - 5.28 10*6/mm3    Hemoglobin 13.3 12.0 - 15.9 g/dL    Hematocrit 40.2 34.0 - 46.6 %    MCV 97.3 (H) 79.0 - 97.0 fL    MCH 32.2 26.6 - 33.0 pg    MCHC 33.1 31.5 - 35.7 g/dL    RDW 14.2 12.3 - 15.4 %    RDW-SD 50.1 37.0 - 54.0 fl    MPV 10.6 6.0 - 12.0 fL    Platelets 225 140 - 450  10*3/mm3    Neutrophil % 69.6 42.7 - 76.0 %    Lymphocyte % 16.4 (L) 19.6 - 45.3 %    Monocyte % 11.5 5.0 - 12.0 %    Eosinophil % 1.2 0.3 - 6.2 %    Basophil % 0.9 0.0 - 1.5 %    Immature Grans % 0.4 0.0 - 0.5 %    Neutrophils, Absolute 9.41 (H) 1.70 - 7.00 10*3/mm3    Lymphocytes, Absolute 2.22 0.70 - 3.10 10*3/mm3    Monocytes, Absolute 1.56 (H) 0.10 - 0.90 10*3/mm3    Eosinophils, Absolute 0.16 0.00 - 0.40 10*3/mm3    Basophils, Absolute 0.12 0.00 - 0.20 10*3/mm3    Immature Grans, Absolute 0.06 (H) 0.00 - 0.05 10*3/mm3    nRBC 1.6 (H) 0.0 - 0.2 /100 WBC   ECG 12 Lead    Collection Time: 03/05/21 12:25 AM   Result Value Ref Range    QT Interval 412 ms       Ordered the above labs and reviewed the results.        RADIOLOGY  Ct Facial Bones Without Contrast    Result Date: 3/4/2021  Patient: SVETLANA REYES  Time Out: 23:27 Exam(s): CT FACIAL Without Contrast EXAM:   CT Maxillofacial Without Intravenous Contrast CLINICAL HISTORY:    Reason for exam: left facial swelling, left maxillary tooth pain. TECHNIQUE:   Axial computed tomography images of the face without intravenous contrast.  CTDI is 30.40 mGy and DLP is 613.50 mGy-cm.  This CT exam was performed according to the principle of ALARA (As Low As Reasonably Achievable) by using one or more of the following dose reduction techniques: automated exposure control, adjustment of the mA and or kV according to patient size, and or use of iterative reconstruction technique. COMPARISON:   No relevant prior studies available. FINDINGS:   Artifacts:  Dental hardware.   Limitations:  Lack of intravenous contrast.   Bones joints:  No acute abnormality.   Soft tissues:  Mild left facial edema.  No loculated fluid collection.   Orbits:  No significant abnormality.   Sinuses:  Mild mucosal thickening in the paranasal sinuses.  No air- fluid levels. IMPRESSION:       Mild left facial edema may be related to infection.  No drainable fluid collection, however examination is  limited by artifact from dental hardware and lack of intravenous contrast.     Electronically signed by Mulu Cassidy MD on 03-04-21 at 2327      Ordered the above noted radiological studies. Reviewed by me in PACS.            PROCEDURES  Procedures              MEDICATIONS GIVEN IN ER  Medications   sodium chloride 0.9 % flush 10 mL (has no administration in time range)   clindamycin (CLEOCIN) 600 mg in dextrose 5% 50 mL IVPB (premix) (0 mg Intravenous Stopped 3/4/21 2347)                   MEDICAL DECISION MAKING, PROGRESS, and CONSULTS    All labs have been independently reviewed by me.  All radiology studies have been reviewed by me and discussed with radiologist dictating the report.   EKG's independently viewed and interpreted by me.  Discussion below represents my analysis of pertinent findings related to patient's condition, differential diagnosis, treatment plan and final disposition.    ED Course as of Mar 05 0033   Thu Mar 04, 2021   2324 WBC(!): 13.53 [JR]   2324 INR(!): 2.11 [JR]   2324 Glucose(!): 124 [JR]   2324 Creatinine: 0.88 [JR]   2324 ALT (SGPT)(!): 34 [JR]   2324 AST (SGOT)(!): 36 [JR]   Fri Mar 05, 2021   0024 Patient reassessed and updated on laboratory and imaging findings.  I explained that the definitive treatment would be either extraction or root canal of the affected tooth, and she has root canal scheduled for tomorrow.  I did offer admission for continued IV antibiotics, however explained that that would then delay the definitive care which cannot be performed in the hospital.  Patient would prefer to go home and have her procedure done tomorrow as scheduled.  Her INR is therapeutic today.  I advised that she should check her INR next week because the antibiotics may cause it to go up.  I also explained return precautions should she have worsening symptoms or failure to improve despite the oral antibiotics and the root canal tomorrow.    [JR]   0025 I also discussed the risk and  benefits of the antibiotic she was taking and that she is at risk for recurrent C. difficile.  She currently takes a daily probiotic.    [JR]   0029 EKG          EKG time: 0025  Rhythm/Rate: Sinus rhythm, 81  P waves and DC: Normal  QRS, axis: Normal axis  ST and T waves: No acute ischemic changes    Interpreted Contemporaneously by me, independently viewed  Similar compared to prior 6/12/2020          [JR]      ED Course User Index  [JR] Omar Saxena MD              I wore a mask, face shield, and gloves during this patient encounter.  Patient also wearing a surgical mask.  Hand hygeine performed before and after seeing the patient.    DIAGNOSIS  Final diagnoses:   Facial cellulitis   Periapical abscess   Chronic anticoagulation         DISPOSITION  DISCHARGE    Patient discharged in stable condition.    Reviewed implications of results, diagnosis, meds, responsibility to follow up, warning signs and symptoms of possible worsening, potential complications and reasons to return to ER.    Patient/Family voiced understanding of above instructions.    Discussed plan for discharge, as there is no emergent indication for admission. Patient referred to primary care provider for BP management due to today's BP. Pt/family is agreeable and understands need for follow up and repeat testing.  Pt is aware that discharge does not mean that nothing is wrong but it indicates no emergency is present that requires admission and they must continue care with follow-up as given below or physician of their choice.     FOLLOW-UP  Matt Rose MD  [id number] [id number] [phone]    Schedule an appointment as soon as possible for a visit       Please follow up tomorrow for your root canal.               Medication List      Changed    guaiFENesin 600 MG 12 hr tablet  Commonly known as: MUCINEX  Take 2 tablets by mouth Every 12 (Twelve) Hours.  What changed:   · when to take this  · reasons to  take this                      Latest Documented Vital Signs:  As of 00:33 EST  BP- 174/87 HR- 108 Temp- (!) 100.9 °F (38.3 °C) (Tympanic) O2 sat- 96%        --    Please note that portions of this were completed with a voice recognition program.          Omar Saxena MD  03/05/21 0033

## 2021-03-05 NOTE — ED NOTES
This RN wore appropriate PPE in room including gloves, mask and goggles. Hand hygeine performed prior to entering room and prior to leaving room .      Deborah Avalos, RN  03/04/21 7005

## 2021-03-08 ENCOUNTER — PATIENT OUTREACH (OUTPATIENT)
Dept: CASE MANAGEMENT | Facility: OTHER | Age: 86
End: 2021-03-08

## 2021-03-08 NOTE — OUTREACH NOTE
"Patient Outreach Note  Talked with patient.  Discussed 3/4/21 ED visit regarding facial cellulitis and periapical abscess. Patient states to have had endodontist appointment; had root canal to one tooth; completed antibiotics and scheduled for 2nd root canal at the end of the month. She reports mouth is still sore and lips are \"puffy\" but improving. She reports improvement regarding fever; eating soft foods but has good appetite; and no difficulty with chest pain; SOB or sleeping.  Patient self monitors PT/INR and will check PT/INR today as ED directed. Patient reports to be compliant with medications; using nebulizer and vest vibrator  about 2 times per day;use of )2 at night;  monitoring daily weight and able to monitor  blood pressure. Patient confirms opthalmology appointment follow ups and 3/17/21 pulmonology appointment with Dr. Tucker . Patient has received 2nd COVID 19 vaccine and has decided to stop driving this spring.  Patient continues living alone; independent with activities and using walking stick to assist in ambulation as needed. Reviewed with patient ED AVS recommendations; education provided; COVID 19 precautions; 24/7 Nurse Line Telephone number and ACM contact information.   Patient verbalized understanding and states to appreciate phone call. SDOH reviewed and reports no difficulty with food; transportation or financial resources.  No further questions or concerns voiced at this time.     Ria Hinton RN  Ambulatory     3/8/2021, 11:33 EST      "

## 2021-03-09 LAB — INR PPP: 2.2

## 2021-03-10 ENCOUNTER — ANTICOAGULATION VISIT (OUTPATIENT)
Dept: PHARMACY | Facility: HOSPITAL | Age: 86
End: 2021-03-10

## 2021-03-10 DIAGNOSIS — I48.19 PERSISTENT ATRIAL FIBRILLATION (HCC): ICD-10-CM

## 2021-03-10 NOTE — PROGRESS NOTES
Anticoagulation Clinic Progress Note    Anticoagulation Summary  As of 3/10/2021    INR goal:  2.0-3.0   TTR:  64.9 % (2.5 y)   INR used for dosin.20 (3/9/2021)   Warfarin maintenance plan:  3 mg every Mon; 4.5 mg all other days   Weekly warfarin total:  30 mg   No change documented:  Reema Dorantes   Plan last modified:  Marlene Herrmann, [facility] (2021)   Next INR check:  3/16/2021   Priority:  High   Target end date:      Indications    Persistent atrial fibrillation (CMS/HCC) [I48.19]             Anticoagulation Episode Summary     INR check location:      Preferred lab:      Send INR reminders to:  [facility]    Comments:  **Acelis - ok to only call when out of range**      Anticoagulation Care Providers     Provider Role Specialty Phone number    Marcie Robbins MD Referring Cardiology [phone]          Clinic Interview:  No pertinent clinical findings have been reported.    INR History:  Anticoagulation Monitoring 2021 3/4/2021 3/10/2021   INR 2.20 2.20 2.20   INR Date 2021 3/3/2021 3/9/2021   INR Goal 2.0-3.0 2.0-3.0 2.0-3.0   Trend Same Same Same   Last Week Total 30 mg 30 mg 30 mg   Next Week Total 30 mg 30 mg 30 mg   Sun 4.5 mg 4.5 mg 4.5 mg   Mon 3 mg 3 mg 3 mg   Tue 4.5 mg 4.5 mg -   Wed 4.5 mg 4.5 mg 4.5 mg   Thu 4.5 mg 4.5 mg 4.5 mg   Fri 4.5 mg 4.5 mg 4.5 mg   Sat 4.5 mg 4.5 mg 4.5 mg   Visit Report - - -   Some recent data might be hidden       Plan:  1. INR is therapeutic today- see above in Anticoagulation Summary.    Agnieszka Rizzo to continue their warfarin regimen- see above in Anticoagulation Summary.  2. Follow up in 1 weeks  3. Pt has agreed to only be called if INR out of range. They have been instructed to call if any changes in medications, doses, concerns, etc. Patient expresses understanding and has no further questions at this time.    Reema Dorantes

## 2021-03-11 RX ORDER — FERROUS SULFATE 325(65) MG
TABLET ORAL
Qty: 90 TABLET | Refills: 0 | Status: SHIPPED | OUTPATIENT
Start: 2021-03-11 | End: 2021-06-09

## 2021-03-11 RX ORDER — FERROUS SULFATE 325(65) MG
1 TABLET ORAL
Qty: 90 TABLET | Refills: 0 | OUTPATIENT
Start: 2021-03-11

## 2021-03-11 NOTE — TELEPHONE ENCOUNTER
Patient called in requesting a new Rx for     ferrous sulfate 325 (65 FE) MG tablet  90 qty    [facility] [place] [address] [facility]    Best call back # [phone]

## 2021-03-17 ENCOUNTER — ANTICOAGULATION VISIT (OUTPATIENT)
Dept: PHARMACY | Facility: HOSPITAL | Age: 86
End: 2021-03-17

## 2021-03-17 DIAGNOSIS — I48.19 PERSISTENT ATRIAL FIBRILLATION (HCC): ICD-10-CM

## 2021-03-17 LAB — INR PPP: 1.7

## 2021-03-17 NOTE — PROGRESS NOTES
Anticoagulation Clinic Progress Note    Anticoagulation Summary  As of 3/17/2021    INR goal:  2.0-3.0   TTR:  64.7 % (2.5 y)   INR used for dosin.70 (3/17/2021)   Warfarin maintenance plan:  3 mg every Mon; 4.5 mg all other days   Weekly warfarin total:  30 mg   Plan last modified:  Marlene Herrmann, [facility] (2021)   Next INR check:  3/24/2021   Priority:  High   Target end date:      Indications    Persistent atrial fibrillation (CMS/HCC) [I48.19]             Anticoagulation Episode Summary     INR check location:      Preferred lab:      Send INR reminders to:   [facility]    Comments:  **Acelis - ok to only call when out of range**      Anticoagulation Care Providers     Provider Role Specialty Phone number    Marcie Robbins MD Referring Cardiology [phone]          Clinic Interview:  Patient Findings     Negatives:  Signs/symptoms of thrombosis, Signs/symptoms of bleeding,   Laboratory test error suspected, Change in health, Change in alcohol use,   Change in activity, Upcoming invasive procedure, Emergency department   visit, Upcoming dental procedure, Missed doses, Extra doses, Change in   medications, Change in diet/appetite, Hospital admission, Bruising, Other   complaints      Clinical Outcomes     Negatives:  Major bleeding event, Thromboembolic event,   Anticoagulation-related hospital admission, Anticoagulation-related ED   visit, Anticoagulation-related fatality        INR History:  Anticoagulation Monitoring 3/4/2021 3/10/2021 3/17/2021   INR 2.20 2.20 1.70   INR Date 3/3/2021 3/9/2021 3/17/2021   INR Goal 2.0-3.0 2.0-3.0 2.0-3.0   Trend Same Same Same   Last Week Total 30 mg 30 mg 30 mg   Next Week Total 30 mg 30 mg 31.5 mg   Sun 4.5 mg 4.5 mg 4.5 mg   Mon 3 mg 3 mg 3 mg   Tue 4.5 mg - 4.5 mg   Wed 4.5 mg 4.5 mg 6 mg (3/17)   Thu 4.5 mg 4.5 mg 4.5 mg   Fri 4.5 mg 4.5 mg 4.5 mg   Sat 4.5 mg 4.5 mg 4.5 mg   Visit Report - - -   Some recent data might be hidden              Plan:  1. INR is Subtherapeutic. Will instruct Agnieszka Rizzo to boost dose today only then continue their warfarin regimen - see above in Anticoagulation Summary.  2. Follow up in 1 week  3. Patient has been instructed to call if any changes in medications, doses, concerns, etc. Patient expresses understanding and has no further questions at this time.    Jaylen Garcia III, PharmD  PGY1 Pharmacy Resident

## 2021-03-23 ENCOUNTER — PATIENT OUTREACH (OUTPATIENT)
Dept: CASE MANAGEMENT | Facility: OTHER | Age: 86
End: 2021-03-23

## 2021-03-23 NOTE — OUTREACH NOTE
Patient Outreach Note  Talked with patient. Patient states to continue with dental appointments regarding root canal. She states to be compliant with medications; medical appointments; use of O2 at night; nebulizer ; monitoring of blood pressure; PT/INR and weight..Patient confirmed she will discontinue driving in April and will be receiving assistance with transportation.  Rviewed with patient 24/7 Nurse Line Telephone number; medical appointments;  Medications and verbalized understanding. Patient has met care plan goals, care gaps have been addressed; annual wellness visit has been completed;. My Chart (Active) and Advance Directives (completed). Patient exhibits strong sense of health self-management and adequate support system. Patient states to appreciate patient outreach. No further questions voiced at this time.     Ria Hinton RN  Ambulatory     3/23/2021, 15:40 EDT

## 2021-03-24 LAB — INR PPP: 1.7

## 2021-03-25 ENCOUNTER — ANTICOAGULATION VISIT (OUTPATIENT)
Dept: PHARMACY | Facility: HOSPITAL | Age: 86
End: 2021-03-25

## 2021-03-25 DIAGNOSIS — I48.19 PERSISTENT ATRIAL FIBRILLATION (HCC): ICD-10-CM

## 2021-03-25 NOTE — PROGRESS NOTES
Anticoagulation Clinic Progress Note    Anticoagulation Summary  As of 3/25/2021    INR goal:  2.0-3.0   TTR:  64.2 % (2.5 y)   INR used for dosin.70 (3/24/2021)   Warfarin maintenance plan:  4.5 mg every day   Weekly warfarin total:  31.5 mg   Plan last modified:  Lina oMrris RPH (3/25/2021)   Next INR check:  3/30/2021   Priority:  High   Target end date:      Indications    Persistent atrial fibrillation (CMS/HCC) [I48.19]             Anticoagulation Episode Summary     INR check location:      Preferred lab:      Send INR reminders to:  [facility] CLINICAL POOL    Comments:  **Acelis - ok to only call when out of range**      Anticoagulation Care Providers     Provider Role Specialty Phone number    Marcie Robbins MD Referring Cardiology [phone]          INR History:  Anticoagulation Monitoring 3/10/2021 3/17/2021 3/25/2021   INR 2.20 1.70 1.70   INR Date 3/9/2021 3/17/2021 3/24/2021   INR Goal 2.0-3.0 2.0-3.0 2.0-3.0   Trend Same Same Up   Last Week Total 30 mg 30 mg 31.5 mg   Next Week Total 30 mg 31.5 mg 33 mg   Sun 4.5 mg 4.5 mg 4.5 mg   Mon 3 mg 3 mg 4.5 mg   Tue - 4.5 mg -   Wed 4.5 mg 6 mg (3/17) -   Thu 4.5 mg 4.5 mg 6 mg (3/25)   Fri 4.5 mg 4.5 mg 4.5 mg   Sat 4.5 mg 4.5 mg 4.5 mg   Visit Report - - -   Some recent data might be hidden       Plan:  1. INR is subtherapeutic today- see above in Anticoagulation Summary.   Will instruct Agnieszka Rizzo to Increase their warfarin regimen- see above in Anticoagulation Summary.  2. Follow up in 6 days  3. Spoke with pt today. They have been instructed to call if any changes in medications, doses, concerns, etc. Patient expresses understanding and has no further questions at this time.    Lina Morris RPH

## 2021-03-30 ENCOUNTER — ANTICOAGULATION VISIT (OUTPATIENT)
Dept: PHARMACY | Facility: HOSPITAL | Age: 86
End: 2021-03-30

## 2021-03-30 DIAGNOSIS — I48.19 PERSISTENT ATRIAL FIBRILLATION (HCC): ICD-10-CM

## 2021-03-30 LAB — INR PPP: 3.3

## 2021-03-30 NOTE — PROGRESS NOTES
Anticoagulation Clinic Progress Note    Anticoagulation Summary  As of 3/30/2021    INR goal:  2.0-3.0   TTR:  64.1 % (2.5 y)   INR used for dosing:  3.30 (3/30/2021)   Warfarin maintenance plan:  4.5 mg every day   Weekly warfarin total:  31.5 mg   Plan last modified:  iLna Morris RPH (3/25/2021)   Next INR check:  4/6/2021   Priority:  High   Target end date:      Indications    Persistent atrial fibrillation (CMS/HCC) [I48.19]             Anticoagulation Episode Summary     INR check location:      Preferred lab:      Send INR reminders to:   [facility]    Comments:  **Acelis - ok to only call when out of range**      Anticoagulation Care Providers     Provider Role Specialty Phone number    Marcie Robbins MD Referring Cardiology [phone]          Clinic Interview:  Patient Findings     Positives:  Other complaints    Negatives:  Signs/symptoms of thrombosis, Signs/symptoms of bleeding,   Laboratory test error suspected, Change in health, Change in alcohol use,   Change in activity, Upcoming invasive procedure, Emergency department   visit, Upcoming dental procedure, Missed doses, Extra doses, Change in   medications, Change in diet/appetite, Hospital admission, Bruising    Comments:  Had root canal this morning; denies any other changes.      Clinical Outcomes     Negatives:  Major bleeding event, Thromboembolic event,   Anticoagulation-related hospital admission, Anticoagulation-related ED   visit, Anticoagulation-related fatality    Comments:  Had root canal this morning; denies any other changes.        INR History:  Anticoagulation Monitoring 3/17/2021 3/25/2021 3/30/2021   INR 1.70 1.70 3.30   INR Date 3/17/2021 3/24/2021 3/30/2021   INR Goal 2.0-3.0 2.0-3.0 2.0-3.0   Trend Same Up Same   Last Week Total 30 mg 31.5 mg 34.5 mg   Next Week Total 31.5 mg 33 mg 30 mg   Sun 4.5 mg 4.5 mg 4.5 mg   Mon 3 mg 4.5 mg 4.5 mg   Tue 4.5 mg - 3 mg (3/30)   Wed 6 mg (3/17) - 4.5 mg   Thu 4.5 mg  6 mg (3/25) 4.5 mg   Fri 4.5 mg 4.5 mg 4.5 mg   Sat 4.5 mg 4.5 mg 4.5 mg   Visit Report - - -   Some recent data might be hidden       Plan:  1. INR is Supratherapeutic today- see above in Anticoagulation Summary.   Will instruct Agnieszka Rizzo to Change their warfarin regimen- see above in Anticoagulation Summary.  2. Follow up in 1 week  3. They have been instructed to call if any changes in medications, doses, concerns, etc. Patient expresses understanding and has no further questions at this time.    Lev Mckeon, [facility]

## 2021-04-06 ENCOUNTER — ANTICOAGULATION VISIT (OUTPATIENT)
Dept: PHARMACY | Facility: HOSPITAL | Age: 86
End: 2021-04-06

## 2021-04-06 DIAGNOSIS — I48.19 PERSISTENT ATRIAL FIBRILLATION (HCC): ICD-10-CM

## 2021-04-06 LAB — INR PPP: 3.3

## 2021-04-06 NOTE — PROGRESS NOTES
Anticoagulation Clinic Progress Note    Anticoagulation Summary  As of 4/6/2021    INR goal:  2.0-3.0   TTR:  63.7 % (2.5 y)   INR used for dosing:  3.30 (4/6/2021)   Warfarin maintenance plan:  3 mg every Mon; 4.5 mg all other days   Weekly warfarin total:  30 mg   Plan last modified:  Lina Morris RPH (4/6/2021)   Next INR check:  4/13/2021   Priority:  High   Target end date:      Indications    Persistent atrial fibrillation (CMS/HCC) [I48.19]             Anticoagulation Episode Summary     INR check location:      Preferred lab:      [facility]    Comments:  **Acelis - ok to only call when out of range**      Anticoagulation Care Providers     Provider Role Specialty Phone number    Marcie Robbins MD Referring Cardiology [phone]          INR History:  Anticoagulation Monitoring 3/25/2021 3/30/2021 4/6/2021   INR 1.70 3.30 3.30   INR Date 3/24/2021 3/30/2021 4/6/2021   INR Goal 2.0-3.0 2.0-3.0 2.0-3.0   Trend Up Same Down   Last Week Total 31.5 mg 34.5 mg 28.5 mg   Next Week Total 33 mg 30 mg 27 mg   Sun 4.5 mg 4.5 mg 4.5 mg   Mon 4.5 mg 4.5 mg 3 mg   Tue - 3 mg (3/30) 1.5 mg (4/6)   Wed - 4.5 mg 4.5 mg   Thu 6 mg (3/25) 4.5 mg 4.5 mg   Fri 4.5 mg 4.5 mg 4.5 mg   Sat 4.5 mg 4.5 mg 4.5 mg   Visit Report - - -   Some recent data might be hidden       Plan:  1. INR is Supratherapeutic today- see above in Anticoagulation Summary.   Will instruct Agnieszka Rizzo to Change their warfarin regimen to 3mg on Mon, 4.5mg all other days but only 1.5mg today--- see above in Anticoagulation Summary.  2. Follow up in 1 week  3.Spoke with pt today. They have been instructed to call if any changes in medications, doses, concerns, etc. Patient expresses understanding and has no further questions at this time.    Lina Morris RPH

## 2021-04-13 LAB — INR PPP: 1.8

## 2021-04-14 ENCOUNTER — ANTICOAGULATION VISIT (OUTPATIENT)
Dept: PHARMACY | Facility: HOSPITAL | Age: 86
End: 2021-04-14

## 2021-04-14 DIAGNOSIS — I48.19 PERSISTENT ATRIAL FIBRILLATION (HCC): Primary | ICD-10-CM

## 2021-04-14 NOTE — PROGRESS NOTES
Anticoagulation Clinic Progress Note    Anticoagulation Summary  As of 2021    INR goal:  2.0-3.0   TTR:  63.7 % (2.6 y)   INR used for dosin.80 (2021)   Warfarin maintenance plan:  3 mg every Mon; 4.5 mg all other days   Weekly warfarin total:  30 mg   Plan last modified:  Lina Morris RPH (2021)   Next INR check:  2021   Priority:  High   Target end date:      Indications    Persistent atrial fibrillation (CMS/HCC) [I48.19]             Anticoagulation Episode Summary     INR check location:      Preferred lab:      Send INR reminders to:  [facility]    Comments:  **Acelis - ok to only call when out of range**      Anticoagulation Care Providers     Provider Role Specialty Phone number    Marcie Robbins MD Referring Cardiology [phone]          Clinic Interview:  Patient Findings     Positives:  Change in diet/appetite    Negatives:  Signs/symptoms of thrombosis, Signs/symptoms of bleeding,   Laboratory test error suspected, Change in health, Change in alcohol use,   Change in activity, Upcoming invasive procedure, Emergency department   visit, Upcoming dental procedure, Missed doses, Extra doses, Change in   medications, Hospital admission, Bruising, Other complaints    Comments:  Stopped eating most fruit (not any that are typically   associated with high INR).      Clinical Outcomes     Negatives:  Major bleeding event, Thromboembolic event,   Anticoagulation-related hospital admission, Anticoagulation-related ED   visit, Anticoagulation-related fatality    Comments:  Stopped eating most fruit (not any that are typically   associated with high INR).        INR History:  Anticoagulation Monitoring 3/30/2021 2021 2021   INR 3.30 3.30 1.80   INR Date 3/30/2021 2021 2021   INR Goal 2.0-3.0 2.0-3.0 2.0-3.0   Trend Same Down Same   Last Week Total 34.5 mg 28.5 mg 30 mg   Next Week Total 30 mg 27 mg 30 mg   Sun 4.5 mg 4.5 mg 4.5 mg   Mon 4.5 mg 3 mg 3  mg   Tue 3 mg (3/30) 1.5 mg (4/6) -   Wed 4.5 mg 4.5 mg 4.5 mg   Thu 4.5 mg 4.5 mg 4.5 mg   Fri 4.5 mg 4.5 mg 4.5 mg   Sat 4.5 mg 4.5 mg 4.5 mg   Visit Report - - -   Some recent data might be hidden       Plan:  1. INR is Subtherapeutic today- see above in Anticoagulation Summary.   Will instruct Agnieszka Rizzo to Continue their warfarin regimen- see above in Anticoagulation Summary.  Suspect lower dose from last week still hanging around.  2. Follow up in 1 week.  3. They have been instructed to call if any changes in medications, doses, concerns, etc. Patient expresses understanding and has no further questions at this time.    Fidelina Rutledge [facility]

## 2021-04-20 LAB — INR PPP: 2.1

## 2021-04-21 ENCOUNTER — ANTICOAGULATION VISIT (OUTPATIENT)
Dept: PHARMACY | Facility: HOSPITAL | Age: 86
End: 2021-04-21

## 2021-04-21 DIAGNOSIS — I48.19 PERSISTENT ATRIAL FIBRILLATION (HCC): Primary | ICD-10-CM

## 2021-04-21 NOTE — PROGRESS NOTES
Anticoagulation Clinic Progress Note    Anticoagulation Summary  As of 2021    INR goal:  2.0-3.0   TTR:  63.5 % (2.6 y)   INR used for dosin.10 (2021)   Warfarin maintenance plan:  3 mg every Mon; 4.5 mg all other days   Weekly warfarin total:  30 mg   No change documented:  Alireza Anderson, PharmD   Plan last modified:  Lina Morris RPH (2021)   Next INR check:  2021   Priority:  High   Target end date:      Indications    Persistent atrial fibrillation (CMS/HCC) [I48.19]             Anticoagulation Episode Summary     INR check location:      Preferred lab:      Send INR reminders to:   [facility]    Comments:  **Acelis - ok to only call when out of range**      Anticoagulation Care Providers     Provider Role Specialty Phone number    Marcie Robbins MD Referring Cardiology [phone]        Clinic Interview:  Patient Findings     Negatives:  Signs/symptoms of thrombosis, Signs/symptoms of bleeding,   Laboratory test error suspected, Change in health, Change in alcohol use,   Change in activity, Upcoming invasive procedure, Emergency department   visit, Upcoming dental procedure, Missed doses, Extra doses, Change in   medications, Change in diet/appetite, Hospital admission, Bruising, Other   complaints      Clinical Outcomes     Negatives:  Major bleeding event, Thromboembolic event,   Anticoagulation-related hospital admission, Anticoagulation-related ED   visit, Anticoagulation-related fatality        INR History:  Anticoagulation Monitoring 2021   INR 3.30 1.80 2.10   INR Date 2021   INR Goal 2.0-3.0 2.0-3.0 2.0-3.0   Trend Down Same Same   Last Week Total 28.5 mg 30 mg 30 mg   Next Week Total 27 mg 30 mg 30 mg   Sun 4.5 mg 4.5 mg 4.5 mg   Mon 3 mg 3 mg 3 mg   Tue 1.5 mg () - 4.5 mg   Wed 4.5 mg 4.5 mg 4.5 mg   Thu 4.5 mg 4.5 mg 4.5 mg   Fri 4.5 mg 4.5 mg 4.5 mg   Sat 4.5 mg 4.5 mg 4.5 mg   Visit Report - - -    Some recent data might be hidden     Plan:  1. INR is Therapeutic today- see above in Anticoagulation Summary.   Will instruct Agnieszka Rizzo to Continue their warfarin regimen- see above in Anticoagulation Summary.  2. Follow up in 2 weeks  3.  They have been instructed to call if any changes in medications, doses, concerns, etc. Patient expresses understanding and has no further questions at this time.    Alireza Anderson, JohannaD

## 2021-04-27 DIAGNOSIS — I10 BENIGN ESSENTIAL HYPERTENSION: ICD-10-CM

## 2021-04-28 RX ORDER — TORSEMIDE 20 MG/1
TABLET ORAL
Qty: 180 TABLET | Refills: 0 | Status: SHIPPED | OUTPATIENT
Start: 2021-04-28 | End: 2021-08-24 | Stop reason: SDUPTHER

## 2021-05-04 ENCOUNTER — ANTICOAGULATION VISIT (OUTPATIENT)
Dept: PHARMACY | Facility: HOSPITAL | Age: 86
End: 2021-05-04

## 2021-05-04 DIAGNOSIS — I48.19 PERSISTENT ATRIAL FIBRILLATION (HCC): Primary | ICD-10-CM

## 2021-05-04 LAB — INR PPP: 1.9

## 2021-05-04 NOTE — PROGRESS NOTES
Anticoagulation Clinic Progress Note    Anticoagulation Summary  As of 2021    INR goal:  2.0-3.0   TTR:  63.3 % (2.6 y)   INR used for dosin.90 (2021)   Warfarin maintenance plan:  4.5 mg every day   Weekly warfarin total:  31.5 mg   Plan last modified:  Lev Mckeon [facility] (2021)   Next INR check:  2021   Priority:  High   Target end date:      Indications    Persistent atrial fibrillation (CMS/HCC) [I48.19]             Anticoagulation Episode Summary     INR check location:      Preferred lab:      Send INR reminders to:   [facility]IN[facility] POOL    Comments:  **Acelis - ok to only call when out of range**      Anticoagulation Care Providers     Provider Role Specialty Phone number    Marcie Robbins MD Referring Cardiology [phone]          Clinic Interview:  Patient Findings     Negatives:  Signs/symptoms of thrombosis, Signs/symptoms of bleeding,   Laboratory test error suspected, Change in health, Change in alcohol use,   Change in activity, Upcoming invasive procedure, Emergency department   visit, Upcoming dental procedure, Missed doses, Extra doses, Change in   medications, Change in diet/appetite, Hospital admission, Bruising, Other   complaints      Clinical Outcomes     Negatives:  Major bleeding event, Thromboembolic event,   Anticoagulation-related hospital admission, Anticoagulation-related ED   visit, Anticoagulation-related fatality        INR History:  Anticoagulation Monitoring 2021   INR 1.80 2.10 1.90   INR Date 2021   INR Goal 2.0-3.0 2.0-3.0 2.0-3.0   Trend Same Same Up   Last Week Total 30 mg 30 mg 30 mg   Next Week Total 30 mg 30 mg 33 mg   Sun 4.5 mg 4.5 mg 4.5 mg   Mon 3 mg 3 mg 4.5 mg   Tue - 4.5 mg 6 mg (); Otherwise 4.5 mg   Wed 4.5 mg 4.5 mg 4.5 mg   Thu 4.5 mg 4.5 mg 4.5 mg   Fri 4.5 mg 4.5 mg 4.5 mg   Sat 4.5 mg 4.5 mg 4.5 mg   Visit Report - - -   Some recent data might be hidden       Plan:  1. INR  is Subtherapeutic today- see above in Anticoagulation Summary.   Will instruct Agnieszka Rizzo to Increase their warfarin regimen- see above in Anticoagulation Summary.  2. Follow up in 2 weeks  3. They have been instructed to call if any changes in medications, doses, concerns, etc. Patient expresses understanding and has no further questions at this time.    Lev Mckeon [facility]

## 2021-05-18 ENCOUNTER — ANTICOAGULATION VISIT (OUTPATIENT)
Dept: PHARMACY | Facility: HOSPITAL | Age: 86
End: 2021-05-18

## 2021-05-18 DIAGNOSIS — I48.19 PERSISTENT ATRIAL FIBRILLATION (HCC): Primary | ICD-10-CM

## 2021-05-18 LAB — INR PPP: 3.7

## 2021-05-18 NOTE — PROGRESS NOTES
Anticoagulation Clinic Progress Note    Anticoagulation Summary  As of 5/18/2021    INR goal:  2.0-3.0   TTR:  63.2 % (2.7 y)   INR used for dosing:  3.70 (5/18/2021)   Warfarin maintenance plan:  4.5 mg every day   Weekly warfarin total:  31.5 mg   Plan last modified:  Lev Mckeon RPH (5/4/2021)   Next INR check:  5/25/2021   Priority:  High   Target end date:      Indications    Persistent atrial fibrillation (CMS/HCC) [I48.19]             Anticoagulation Episode Summary     INR check location:      Preferred lab:      Send INR reminders to:   [facility]IN[facility] POOL    Comments:  **Acelis - ok to only call when out of range**      Anticoagulation Care Providers     Provider Role Specialty Phone number    Marcie Robbins MD Referring Cardiology [phone]          Clinic Interview:  Patient Findings     Positives:  Change in activity    Negatives:  Signs/symptoms of thrombosis, Signs/symptoms of bleeding,   Laboratory test error suspected, Change in health, Change in alcohol use,   Upcoming invasive procedure, Emergency department visit, Upcoming dental   procedure, Missed doses, Extra doses, Change in medications, Change in   diet/appetite, Hospital admission, Bruising, Other complaints    Comments:  Has been in [place] on vacation.  Denies any major dietary   interactions.      Clinical Outcomes     Negatives:  Major bleeding event, Thromboembolic event,   Anticoagulation-related hospital admission, Anticoagulation-related ED   visit, Anticoagulation-related fatality    Comments:  Has been in [place] on vacation.  Denies any major dietary   interactions.        INR History:  Anticoagulation Monitoring 4/21/2021 5/4/2021 5/18/2021   INR 2.10 1.90 3.70   INR Date 4/20/2021 5/4/2021 5/18/2021   INR Goal 2.0-3.0 2.0-3.0 2.0-3.0   Trend Same Up Same   Last Week Total 30 mg 30 mg 27 mg   Next Week Total 30 mg 33 mg 30 mg   Sun 4.5 mg 4.5 mg 4.5 mg   Mon 3 mg 4.5 mg 4.5 mg   Tue 4.5 mg 6 mg (5/4); Otherwise 4.5 mg 3  mg (5/18)   Wed 4.5 mg 4.5 mg 4.5 mg   Thu 4.5 mg 4.5 mg 4.5 mg   Fri 4.5 mg 4.5 mg 4.5 mg   Sat 4.5 mg 4.5 mg 4.5 mg   Visit Report - - -   Some recent data might be hidden       Plan:  1. INR is Supratherapeutic today- see above in Anticoagulation Summary.   Will instruct Agnieszka Rizzo to Change their warfarin regimen- see above in Anticoagulation Summary.  2. Follow up in 1 week.  3. They have been instructed to call if any changes in medications, doses, concerns, etc. Patient expresses understanding and has no further questions at this time.    Fidelina Rutledge [facility]

## 2021-05-19 RX ORDER — POTASSIUM CHLORIDE 750 MG/1
CAPSULE, EXTENDED RELEASE ORAL
Qty: 90 CAPSULE | Refills: 2 | Status: SHIPPED | OUTPATIENT
Start: 2021-05-19 | End: 2021-08-24 | Stop reason: SDUPTHER

## 2021-05-25 LAB — INR PPP: 2.4

## 2021-05-26 ENCOUNTER — ANTICOAGULATION VISIT (OUTPATIENT)
Dept: PHARMACY | Facility: HOSPITAL | Age: 86
End: 2021-05-26

## 2021-05-26 DIAGNOSIS — I48.19 PERSISTENT ATRIAL FIBRILLATION (HCC): Primary | ICD-10-CM

## 2021-05-26 NOTE — PROGRESS NOTES
Anticoagulation Clinic Progress Note    Anticoagulation Summary  As of 2021    INR goal:  2.0-3.0   TTR:  63.0 % (2.7 y)   INR used for dosin.40 (2021)   Warfarin maintenance plan:  3 mg every Mon; 4.5 mg all other days   Weekly warfarin total:  30 mg   Plan last modified:  Fidelina Rutledge RPH (2021)   Next INR check:  2021   Priority:  High   Target end date:      Indications    Persistent atrial fibrillation (CMS/HCC) [I48.19]             Anticoagulation Episode Summary     INR check location:      Preferred lab:      Send INR reminders to:   [facility]IN[facility] POOL    Comments:  **Acelis - ok to only call when out of range**      Anticoagulation Care Providers     Provider Role Specialty Phone number    Marcie Robbins MD Referring Cardiology [phone]          Clinic Interview:  Patient Findings     Negatives:  Signs/symptoms of thrombosis, Signs/symptoms of bleeding,   Laboratory test error suspected, Change in health, Change in alcohol use,   Change in activity, Upcoming invasive procedure, Emergency department   visit, Upcoming dental procedure, Missed doses, Extra doses, Change in   medications, Change in diet/appetite, Hospital admission, Bruising, Other   complaints      Clinical Outcomes     Negatives:  Major bleeding event, Thromboembolic event,   Anticoagulation-related hospital admission, Anticoagulation-related ED   visit, Anticoagulation-related fatality        INR History:  Anticoagulation Monitoring 2021   INR 1.90 3.70 2.40   INR Date 2021   INR Goal 2.0-3.0 2.0-3.0 2.0-3.0   Trend Up Same Down   Last Week Total 30 mg 27 mg 30 mg   Next Week Total 33 mg 30 mg 30 mg   Sun 4.5 mg 4.5 mg 4.5 mg   Mon 4.5 mg 4.5 mg 3 mg   Tue 6 mg (); Otherwise 4.5 mg 3 mg () 4.5 mg   Wed 4.5 mg 4.5 mg 4.5 mg   Thu 4.5 mg 4.5 mg 4.5 mg   Fri 4.5 mg 4.5 mg 4.5 mg   Sat 4.5 mg 4.5 mg 4.5 mg   Visit Report - - -   Some recent data  "might be hidden       Plan:  1. INR is Therapeutic today- see above in Anticoagulation Summary.  Had been instructed to take 4.5mg every day but took 3mg Mon (defaulted to \"usual\" dose of 3mg Mon, 4.5mg all other days).  Cont 3mg Mon, 4.5mg AOD.  Will instruct Agnieszka Rizzo to Continue their warfarin regimen- see above in Anticoagulation Summary.  2. Follow up in 2 weeks.  3. They have been instructed to call if any changes in medications, doses, concerns, etc. Patient expresses understanding and has no further questions at this time.    Fidelina Rutledge, [facility]    "

## 2021-06-08 ENCOUNTER — ANTICOAGULATION VISIT (OUTPATIENT)
Dept: PHARMACY | Facility: HOSPITAL | Age: 86
End: 2021-06-08

## 2021-06-08 DIAGNOSIS — I48.19 PERSISTENT ATRIAL FIBRILLATION (HCC): Primary | ICD-10-CM

## 2021-06-08 LAB — INR PPP: 2.6

## 2021-06-08 NOTE — PROGRESS NOTES
Anticoagulation Clinic Progress Note    Anticoagulation Summary  As of 2021    INR goal:  2.0-3.0   TTR:  63.6 % (2.7 y)   INR used for dosin.60 (2021)   Warfarin maintenance plan:  3 mg every Mon; 4.5 mg all other days   Weekly warfarin total:  30 mg   No change documented:  Reema Dorantes   Plan last modified:  Fidelina Rutledge RP (2021)   Next INR check:     Priority:  High   Target end date:      Indications    Persistent atrial fibrillation (CMS/HCC) [I48.19]             Anticoagulation Episode Summary     INR check location:      Preferred lab:      Send INR reminders to:   [facility]IN[facility] POOL    Comments:  **Acelis - ok to only call when out of range**      Anticoagulation Care Providers     Provider Role Specialty Phone number    Marcie Robbins MD Referring Cardiology [phone]          Clinic Interview:  No pertinent clinical findings have been reported.    INR History:  Anticoagulation Monitoring 2021   INR 3.70 2.40 2.60   INR Date 2021   INR Goal 2.0-3.0 2.0-3.0 2.0-3.0   Trend Same Down Same   Last Week Total 27 mg 30 mg 30 mg   Next Week Total 30 mg 30 mg 30 mg   Sun 4.5 mg 4.5 mg 4.5 mg   Mon 4.5 mg 3 mg 3 mg   Tue 3 mg () 4.5 mg 4.5 mg   Wed 4.5 mg 4.5 mg 4.5 mg   Thu 4.5 mg 4.5 mg 4.5 mg   Fri 4.5 mg 4.5 mg 4.5 mg   Sat 4.5 mg 4.5 mg 4.5 mg   Visit Report - - -   Some recent data might be hidden       Plan:  1. INR is therapeutic today- see above in Anticoagulation Summary.    Agnieszka Rizzo to continue their warfarin regimen- see above in Anticoagulation Summary.  2. Follow up in 2 weeks  3. Pt has agreed to only be called if INR out of range. They have been instructed to call if any changes in medications, doses, concerns, etc. Patient expresses understanding and has no further questions at this time.    Reema Dorantes

## 2021-06-09 RX ORDER — WARFARIN SODIUM 3 MG/1
TABLET ORAL
Qty: 130 TABLET | Refills: 1 | Status: SHIPPED | OUTPATIENT
Start: 2021-06-09 | End: 2021-07-12 | Stop reason: SDUPTHER

## 2021-06-09 RX ORDER — FERROUS SULFATE 325(65) MG
TABLET ORAL
Qty: 90 TABLET | Refills: 0 | Status: SHIPPED | OUTPATIENT
Start: 2021-06-09 | End: 2021-09-15

## 2021-06-14 ENCOUNTER — APPOINTMENT (OUTPATIENT)
Dept: GENERAL RADIOLOGY | Facility: HOSPITAL | Age: 86
End: 2021-06-14

## 2021-06-14 PROCEDURE — 71101 X-RAY EXAM UNILAT RIBS/CHEST: CPT | Performed by: FAMILY MEDICINE

## 2021-06-14 PROCEDURE — 73502 X-RAY EXAM HIP UNI 2-3 VIEWS: CPT | Performed by: FAMILY MEDICINE

## 2021-06-17 ENCOUNTER — TELEPHONE (OUTPATIENT)
Dept: INTERNAL MEDICINE | Facility: CLINIC | Age: 86
End: 2021-06-17

## 2021-06-17 NOTE — TELEPHONE ENCOUNTER
Caller: Agnieszka Rizzo    Relationship: Self    Best call back number:[phone]     Caller requesting test results:     What test was performed: X-RAY OF RIB / LEG AND GROIN AREA    When was the test performed: 06/07/21    Where was the test performed: [facility]     Additional notes:PATIENT CALLING STATING SHE HAS  BRUISING ALL UP AND DOWN HER LEFT LEG. SHE STATED SHE HAS A NO AT THE SIDE OF HER HIP AND WANTED TO KNOW IF THIS WILL GO AWAY ON ITS OWN. PATIENT WOULD LIKE TO KNOW IF  HAS READ THE XRAY TO SEE WHATS GOING ON.  PATIENT  HAS PAIN UNDER HER LEFT  BREAST WHEN SHE TAKES A DEEP BREATH SHE WOULD LIKE TO KNOW IF HER RIB IS BRUISED OR CRACKED.

## 2021-06-18 NOTE — TELEPHONE ENCOUNTER
Pt picked up when I went to leave a message.  She reports if there's no fractures then she will just take time to let it heal. If she changes her mind she will call back.

## 2021-06-22 LAB — INR PPP: 2.6

## 2021-06-23 ENCOUNTER — ANTICOAGULATION VISIT (OUTPATIENT)
Dept: PHARMACY | Facility: HOSPITAL | Age: 86
End: 2021-06-23

## 2021-06-23 DIAGNOSIS — I48.19 PERSISTENT ATRIAL FIBRILLATION (HCC): Primary | ICD-10-CM

## 2021-06-23 NOTE — PROGRESS NOTES
Anticoagulation Clinic Progress Note    Anticoagulation Summary  As of 2021    INR goal:  2.0-3.0   TTR:  64.1 % (2.8 y)   INR used for dosin.60 (2021)   Warfarin maintenance plan:  3 mg every Mon; 4.5 mg all other days   Weekly warfarin total:  30 mg   No change documented:  Sara Santana   Plan last modified:  Fidelina Rutledge [facility] (2021)   Next INR check:  2021   Priority:  High   Target end date:      Indications    Persistent atrial fibrillation (CMS/HCC) [I48.19]             Anticoagulation Episode Summary     INR check location:      Preferred lab:      Send INR reminders to:  [facility]    Comments:  **Acelis - ok to only call when out of range**      Anticoagulation Care Providers     Provider Role Specialty Phone number    Marcie Robbins MD Referring Cardiology [phone]          Clinic Interview:  No pertinent clinical findings have been reported.    INR History:  Anticoagulation Monitoring 2021   INR 2.40 2.60 2.60   INR Date 2021   INR Goal 2.0-3.0 2.0-3.0 2.0-3.0   Trend Down Same Same   Last Week Total 30 mg 30 mg 30 mg   Next Week Total 30 mg 30 mg 30 mg   Sun 4.5 mg 4.5 mg 4.5 mg   Mon 3 mg 3 mg 3 mg   Tue 4.5 mg 4.5 mg 4.5 mg   Wed 4.5 mg 4.5 mg 4.5 mg   Thu 4.5 mg 4.5 mg 4.5 mg   Fri 4.5 mg 4.5 mg 4.5 mg   Sat 4.5 mg 4.5 mg 4.5 mg   Visit Report - - -   Some recent data might be hidden       Plan:  1. INR is therapeutic today- see above in Anticoagulation Summary.    Agneiszka AGUIRRE Matthias to continue their warfarin regimen- see above in Anticoagulation Summary.  2. Follow up in 2 weeks  3. Pt has agreed to only be called if INR out of range. They have been instructed to call if any changes in medications, doses, concerns, etc. Patient expresses understanding and has no further questions at this time.    Sara Santana

## 2021-06-29 ENCOUNTER — OFFICE VISIT (OUTPATIENT)
Dept: INTERNAL MEDICINE | Facility: CLINIC | Age: 86
End: 2021-06-29

## 2021-06-29 VITALS
DIASTOLIC BLOOD PRESSURE: 50 MMHG | HEIGHT: 62 IN | OXYGEN SATURATION: 98 % | WEIGHT: 129.6 LBS | HEART RATE: 70 BPM | BODY MASS INDEX: 23.85 KG/M2 | SYSTOLIC BLOOD PRESSURE: 132 MMHG

## 2021-06-29 DIAGNOSIS — E78.2 MIXED HYPERLIPIDEMIA: ICD-10-CM
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
## 2021-06-29 DIAGNOSIS — D50.9 IRON DEFICIENCY ANEMIA, UNSPECIFIED IRON DEFICIENCY ANEMIA TYPE: ICD-10-CM

## 2021-06-29 DIAGNOSIS — I10 BENIGN ESSENTIAL HYPERTENSION: Primary | ICD-10-CM

## 2021-06-29 PROCEDURE — 99214 OFFICE O/P EST MOD 30 MIN: CPT | Performed by: FAMILY MEDICINE

## 2021-06-29 RX ORDER — DIPHENOXYLATE HYDROCHLORIDE AND ATROPINE SULFATE 2.5; .025 MG/1; MG/1
1 TABLET ORAL DAILY
COMMUNITY
End: 2021-07-13

## 2021-06-29 NOTE — PROGRESS NOTES
"Chief Complaint  [facility] follow up to fall  Hypertension hyperlipidemia  Subjective          Agnieszka Rizzo presents to [facility]  History of Present Illness  Patient tripped over her feet at home no loss of consciousness went to [facility] emergency department secondary to rib pain and hip pain x-rays were negative.  She also follows up for ongoing management of chronic medical problems of hypertension hyperlipidemia no unwanted side effects of statin therapy blood pressures less than 140/90 no chest pain shortness of breath or increased fatigue or dizziness  Objective   Vital Signs:   /50 (BP Location: Right arm, Patient Position: Sitting, Cuff Size: Adult)   Pulse 70   Ht 157.5 cm (62\")   Wt 58.8 kg (129 lb 9.6 oz)   SpO2 98%   BMI 23.70 kg/m²     Physical Exam  Constitutional:       Appearance: Normal appearance.   Cardiovascular:      Rate and Rhythm: Normal rate.      Pulses: Normal pulses.   Pulmonary:      Effort: Pulmonary effort is normal.      Breath sounds: Normal breath sounds.   Chest:      Chest wall: Tenderness present.   Musculoskeletal:         General: Swelling and tenderness present.      Comments: Left hip   Skin:     General: Skin is warm and dry.   Neurological:      Mental Status: She is alert.   Psychiatric:         Mood and Affect: Mood normal.         Behavior: Behavior normal.         Thought Content: Thought content normal.         Judgment: Judgment normal.        Result Review :       Data reviewed: Radiologic studies Hip x-ray no fracture rib films negative fracture          Assessment and Plan    Diagnoses and all orders for this visit:    1. Benign essential hypertension (Primary)  -     Cancel: Comprehensive Metabolic Panel  -     Comprehensive Metabolic Panel; Future    2. Iron deficiency anemia, unspecified iron deficiency anemia type  -     Cancel: CBC & Differential  -     CBC & Differential; Future    3. Mixed " hyperlipidemia    4. Fall, subsequent encounter        Follow Up   Return in about 3 months (around 9/29/2021), or if symptoms worsen or fail to improve, for Recheck.  Patient was given instructions and counseling regarding her condition or for health maintenance advice. Please see specific information pulled into the AVS if appropriate.        current

## 2021-07-07 ENCOUNTER — ANTICOAGULATION VISIT (OUTPATIENT)
Dept: PHARMACY | Facility: HOSPITAL | Age: 86
End: 2021-07-07

## 2021-07-07 DIAGNOSIS — I48.19 PERSISTENT ATRIAL FIBRILLATION (HCC): Primary | ICD-10-CM

## 2021-07-07 LAB — INR PPP: 2.6

## 2021-07-07 NOTE — PROGRESS NOTES
Anticoagulation Clinic Progress Note    Anticoagulation Summary  As of 2021    INR goal:  2.0-3.0   TTR:  64.6 % (2.8 y)   INR used for dosin.60 (2021)   Warfarin maintenance plan:  3 mg every Mon; 4.5 mg all other days   Weekly warfarin total:  30 mg   No change documented:  Sara Santana   Plan last modified:  Fidelina [facility] (2021)   Next INR check:  2021   Priority:  High   Target end date:      Indications    Persistent atrial fibrillation (CMS/HCC) [I48.19]             Anticoagulation Episode Summary     INR check location:      Preferred lab:      Send INR reminders to:  [facility]    Comments:  **Acelis - ok to only call when out of range**      Anticoagulation Care Providers     Provider Role Specialty Phone number    Marcie Robbins MD Referring Cardiology [phone]          Clinic Interview:  No pertinent clinical findings have been reported.    INR History:  Anticoagulation Monitoring 2021   INR 2.60 2.60 2.60   INR Date 2021   INR Goal 2.0-3.0 2.0-3.0 2.0-3.0   Trend Same Same Same   Last Week Total 30 mg 30 mg 30 mg   Next Week Total 30 mg 30 mg 30 mg   Sun 4.5 mg 4.5 mg 4.5 mg   Mon 3 mg 3 mg 3 mg   Tue 4.5 mg 4.5 mg 4.5 mg   Wed 4.5 mg 4.5 mg 4.5 mg   Thu 4.5 mg 4.5 mg 4.5 mg   Fri 4.5 mg 4.5 mg 4.5 mg   Sat 4.5 mg 4.5 mg 4.5 mg   Visit Report - - -   Some recent data might be hidden       Plan:  1. INR is therapeutic today- see above in Anticoagulation Summary.    Agnieszka AGUIRRE Matthias to continue their warfarin regimen- see above in Anticoagulation Summary.  2. Follow up in 2 weeks  3. Pt has agreed to only be called if INR out of range. They have been instructed to call if any changes in medications, doses, concerns, etc. Patient expresses understanding and has no further questions at this time.    Sara Santana

## 2021-07-12 ENCOUNTER — APPOINTMENT (OUTPATIENT)
Dept: CT IMAGING | Facility: HOSPITAL | Age: 86
End: 2021-07-12

## 2021-07-12 ENCOUNTER — HOME HEALTH ADMISSION (OUTPATIENT)
Dept: HOME HEALTH SERVICES | Facility: HOME HEALTHCARE | Age: 86
End: 2021-07-12

## 2021-07-12 ENCOUNTER — HOSPITAL ENCOUNTER (EMERGENCY)
Facility: HOSPITAL | Age: 86
Discharge: HOME | End: 2021-07-12
Attending: EMERGENCY MEDICINE | Admitting: EMERGENCY MEDICINE

## 2021-07-12 VITALS
WEIGHT: 130 LBS | HEIGHT: 62 IN | RESPIRATION RATE: 16 BRPM | SYSTOLIC BLOOD PRESSURE: 157 MMHG | HEART RATE: 85 BPM | OXYGEN SATURATION: 96 % | DIASTOLIC BLOOD PRESSURE: 71 MMHG | TEMPERATURE: 97.9 F | BODY MASS INDEX: 23.92 KG/M2

## 2021-07-12 DIAGNOSIS — S09.90XA INJURY OF HEAD, INITIAL ENCOUNTER: ICD-10-CM

## 2021-07-12 DIAGNOSIS — S51.012A LACERATION OF LEFT ELBOW, INITIAL ENCOUNTER: ICD-10-CM

## 2021-07-12 DIAGNOSIS — S41.111A SKIN TEAR OF RIGHT UPPER ARM WITHOUT COMPLICATION, INITIAL ENCOUNTER: Primary | ICD-10-CM

## 2021-07-12 LAB
INR PPP: 4.79 (ref 0.9–1.1)
PROTHROMBIN TIME: 44.7 SECONDS (ref 11.7–14.2)

## 2021-07-12 PROCEDURE — 85610 PROTHROMBIN TIME: CPT | Performed by: PHYSICIAN ASSISTANT

## 2021-07-12 PROCEDURE — 90715 TDAP VACCINE 7 YRS/> IM: CPT | Performed by: PHYSICIAN ASSISTANT

## 2021-07-12 PROCEDURE — 90471 IMMUNIZATION ADMIN: CPT | Performed by: PHYSICIAN ASSISTANT

## 2021-07-12 PROCEDURE — 72125 CT NECK SPINE W/O DYE: CPT

## 2021-07-12 PROCEDURE — 63710000001 ONDANSETRON ODT 4 MG TABLET DISPERSIBLE: Performed by: EMERGENCY MEDICINE

## 2021-07-12 PROCEDURE — 25010000002 TDAP 5-2.5-18.5 LF-MCG/0.5 SUSPENSION: Performed by: PHYSICIAN ASSISTANT

## 2021-07-12 PROCEDURE — 70450 CT HEAD/BRAIN W/O DYE: CPT

## 2021-07-12 PROCEDURE — 99283 EMERGENCY DEPT VISIT LOW MDM: CPT

## 2021-07-12 RX ORDER — SODIUM CHLORIDE 0.9 % (FLUSH) 0.9 %
10 SYRINGE (ML) INJECTION AS NEEDED
Status: DISCONTINUED | OUTPATIENT
Start: 2021-07-12 | End: 2021-07-12 | Stop reason: HOSPADM

## 2021-07-12 RX ORDER — WARFARIN SODIUM 3 MG/1
TABLET ORAL
Qty: 130 TABLET | Refills: 0 | Status: SHIPPED | OUTPATIENT
Start: 2021-07-12 | End: 2021-08-06 | Stop reason: SDUPTHER

## 2021-07-12 RX ORDER — ONDANSETRON 4 MG/1
4 TABLET, ORALLY DISINTEGRATING ORAL ONCE
Status: COMPLETED | OUTPATIENT
Start: 2021-07-12 | End: 2021-07-12

## 2021-07-12 RX ORDER — LIDOCAINE HYDROCHLORIDE AND EPINEPHRINE 10; 10 MG/ML; UG/ML
10 INJECTION, SOLUTION INFILTRATION; PERINEURAL ONCE
Status: COMPLETED | OUTPATIENT
Start: 2021-07-12 | End: 2021-07-12

## 2021-07-12 RX ORDER — HYDROCODONE BITARTRATE AND ACETAMINOPHEN 7.5; 325 MG/1; MG/1
1 TABLET ORAL ONCE
Status: COMPLETED | OUTPATIENT
Start: 2021-07-12 | End: 2021-07-12

## 2021-07-12 RX ADMIN — ONDANSETRON 4 MG: 4 TABLET, ORALLY DISINTEGRATING ORAL at 12:02

## 2021-07-12 RX ADMIN — LIDOCAINE HYDROCHLORIDE,EPINEPHRINE BITARTRATE 10 ML: 10; .01 INJECTION, SOLUTION INFILTRATION; PERINEURAL at 12:04

## 2021-07-12 RX ADMIN — TETANUS TOXOID, REDUCED DIPHTHERIA TOXOID AND ACELLULAR PERTUSSIS VACCINE, ADSORBED 0.5 ML: 5; 2.5; 8; 8; 2.5 SUSPENSION INTRAMUSCULAR at 13:17

## 2021-07-12 RX ADMIN — HYDROCODONE BITARTRATE AND ACETAMINOPHEN 1 TABLET: 7.5; 325 TABLET ORAL at 12:02

## 2021-07-12 NOTE — ED NOTES
"Pt states that she fell about an hour ago outside of her house. Pt states she fell more on her right side, may have scrapped her head and did some \"somersaults.\" Pt complains of pain in both arms that was burning but now just hurts. Skin tears bilaterally on the arms that are open and bleeding. Abrasion on the left knee cap.Pt is on blood thinner. Pt is a/o x 4.      Archana Pearson RN Extern  07/12/21 1013    "

## 2021-07-12 NOTE — PROGRESS NOTES
[facility] to provide Home Care services. Patient and family agreeable. PCP and contact information confirmed.

## 2021-07-12 NOTE — DISCHARGE INSTRUCTIONS
Sutures in left elbow out in 14 days.    Hold warfarin for 3 days.  Usual monitor at home to ensure INR is between 2-3.    Keep right arm dry for 3 days.  Change dressing once a day.  Home health should be contacting you.  The strips will fall off on their own.

## 2021-07-12 NOTE — DISCHARGE PLACEMENT REQUEST
"Svetlana Reyes (89 y.o. Female)     Date of Birth Social Security Number Address Home Phone MRN    08/07/1930  [id number]  [facility] [address] [phone] [id number]    Worship Marital Status          Taoist        Admission Date Admission Type Admitting Provider Attending Provider Department, Room/Bed    7/12/21 Emergency  Jose Lopez MD [facility] Emergency Department, 03/03    Discharge Date Discharge Disposition Discharge Destination                       Attending Provider: Jose Lopez MD    Allergies: Amlodipine Besylate, Aspirin, Bactrim [Sulfamethoxazole-trimethoprim], Erythromycin, Levaquin [Levofloxacin], Macrobid [Nitrofurantoin], Ramipril    Isolation: None   Infection: None   Code Status: Prior    Ht: 157.5 cm (62\")   Wt: 59 kg (130 lb)    Admission Cmt: None   Principal Problem: None                Active Insurance as of 7/12/2021     Primary Coverage     Payor Plan Insurance Group Employer/Plan Group    MEDICARE MEDICARE A & B      Payor Plan Address Payor Plan Phone Number Payor Plan Fax Number Effective Dates    [address] [phone]  8/1/1995 - None Entered    [facility] [id number]       Subscriber Name Subscriber Birth Date Member ID       SVETLANA REYES 8/7/1930 [id number]           Secondary Coverage     Payor Plan Insurance Group Employer/Plan Group    AARP MC SUP AARP HEALTH CARE OPTIONS PLAN F     Payor Plan Address Payor Plan Phone Number Payor Plan Fax Number Effective Dates    [facility] [phone]  7/1/2014 - None Entered    [address]       [facility] [address]       Subscriber Name Subscriber Birth Date Member ID       SVETLANA REYES 8/7/1930 [id number]                 Emergency Contacts      (Rel.) Home Phone Work Phone Mobile Phone    Michelle Zaldivar (Daughter) [phone] -- [phone]    Handy Reyes (Son) [phone] -- --    Isaias Zaldivar (son n law) (Relative) [phone] -- --              "

## 2021-07-12 NOTE — CASE MANAGEMENT/SOCIAL WORK
Discharge Planning Assessment  [facility]     Patient Name: Agnieszka Rizzo  MRN: [id number]  Today's Date: 7/12/2021    Admit Date: 7/12/2021    Discharge Needs Assessment    No documentation.       Discharge Plan    No documentation.       Continued Care and Services - Admitted Since 7/12/2021     Home Medical Care     Service Provider Request Status Selected Services Address Phone Fax Patient Preferred     [facility]  Pending - Request Sent N/A [address] [address] [phone] --       Internal Comment last updated by Agnieszka Farrell, RN 7/12/2021 1152    Called and spoke to Roseann for referral for patient wound care per patient and family request; Agnieszka Farrell RN                             Demographic Summary    No documentation.       Functional Status    No documentation.       Psychosocial    No documentation.       Abuse/Neglect    No documentation.       Legal    No documentation.       Substance Abuse    No documentation.       Patient Forms    No documentation.           Agnieszka Farrell RN

## 2021-07-12 NOTE — ED PROVIDER NOTES
EMERGENCY DEPARTMENT ENCOUNTER    Room Number:  03/03  Date of encounter:  7/12/2021  PCP: Matt Rose MD  Historian: Patient, family      I used full protective equipment while examining this patient.  This includes face mask, gloves and protective eyewear.  I washed my hands before entering the room and immediately upon leaving the room      HPI:  Chief Complaint: Fall  A complete HPI/ROS/PMH/PSH/SH/FH are unobtainable due to: Nothing    Context: Agnieszka Rzizo is a 89 y.o. female who presents to the ED c/o injuries sustained in a mechanical fall just prior to arrival.  Patient states she slipped on wet grass walking down a hill.  Patient fell onto cobblestone pavers.  Patient has extensive right-sided forearm skin tears, laceration to left elbow.  She states she possibly may have hit her head.  She denies any loss of consciousness.  She is on Coumadin for history of paroxysmal atrial fibrillation.  Patient denies injuries to her trunk.  Unknown last tetanus shot.  Patient states she was in her normal state of health prior to this fall.    Review of Medical Records  I reviewed patient's office visit with her internal medicine doctor from 6/29/2021.  Patient being treated for hypertension, anemia    PAST MEDICAL HISTORY  Active Ambulatory Problems     Diagnosis Date Noted   • Benign essential hypertension 12/01/2016   • Chronic coronary artery disease 12/01/2016   • Hyperlipidemia 12/01/2016   • Mitral valve insufficiency 12/01/2016   • Ventricular premature beats 12/01/2016   • Ventricular tachycardia (CMS/[facility]) 12/01/2016   • Persistent atrial fibrillation (CMS/[facility]) 12/01/2016   • Acid-fast bacteria present 01/09/2017   • DNR (do not resuscitate) 03/12/2017   • Chronic diastolic CHF (congestive heart failure) (CMS/[facility]) 03/12/2017   • CHF (congestive heart failure) (CMS/[facility]) 03/22/2017   • Asymptomatic bacteriuria 04/03/2017   • Iron deficiency anemia 04/04/2017   • Bronchiectasis (CMS/[facility]) 04/17/2017   • 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   • Atrial fibrillation with rapid ventricular response (CMS/HCC) 06/09/2017   • Anxiety 06/20/2017   • Exposure to hepatitis A 04/20/2018   • Medicare annual wellness visit, subsequent 04/18/2019   • Abdominal pain 09/23/2019   • Herpes infection 11/19/2019   • Moderate asthma with acute exacerbation 12/02/2019   • Bronchitis, acute, with bronchospasm 12/16/2019   • Abnormal EKG 12/16/2019   • Acute bronchitis due to parainfluenza virus 12/16/2019   • COPD with acute exacerbation  12/16/2019   • Fall      Resolved Ambulatory Problems     Diagnosis Date Noted   • Pneumothorax of right lung after biopsy 11/12/2016   • Pulmonary aspergillosis (CMS/HCC) 11/16/2016   • Heart failure, diastolic, with acute decompensation (CMS/HCC) 12/01/2016   • Pneumonia 01/01/2017   • Pneumonia with the fungal infection aspergillosis (CMS/HCC) 01/06/2017   • Acute respiratory failure with hypoxia (CMS/HCC) 01/09/2017   • Hyponatremia 02/08/2017   • C. difficile colitis 03/11/2017   • Sepsis (CMS/HCC) 03/12/2017   • Pancolitis (CMS/HCC) 04/02/2017   • Hypokalemia 04/04/2017   • Pneumonia of both lungs due to infectious organism 05/31/2017   • UTI (urinary tract infection) 12/19/2019     Past Medical History:   Diagnosis Date   • Aspergillus (CMS/HCC)    • Asthma    • Atrial fibrillation (CMS/HCC)    • Atrial flutter (CMS/HCC)    • C. difficile diarrhea 3/11/2017   • CAD (coronary artery disease)    • Colitis    • COPD (chronic obstructive pulmonary disease) (CMS/[facility])    • Cough    • Cryoglobulinemia (CMS/HCC)    • Dyspnea on exertion    • Hypertension    • Hypoxia    • Infectious viral hepatitis    • Left shoulder pain    • Leg swelling    • Lesion of lung    • Mild tricuspid regurgitation    • MR (mitral regurgitation)    • MVP (mitral valve prolapse)    • Permanent atrial fibrillation (CMS/[facility])    • Pneumothorax    • SOB (shortness of breath)    • Wheeze          PAST SURGICAL  HISTORY  Past Surgical History:   Procedure Laterality Date   • BRONCHOSCOPY N/A 11/12/2016    Procedure: BRONCHOSCOPY WITH FLUORO, BRUSHINGS, BAL, AND BIOPSIES;  Surgeon: Rogelio Tucker MD;  Location: [facility] ENDOSCOPY;  Service:    • BRONCHOSCOPY Bilateral 6/3/2017    Procedure: BRONCHOSCOPY with BAL ;  Surgeon: Sung King MD;  Location: [facility] ENDOSCOPY;  Service:    • BRONCHOSCOPY N/A 12/17/2019    Procedure: BRONCHOSCOPY WITH WASHINGS;  Surgeon: Rogelio Tucker MD;  Location: [facility] ENDOSCOPY;  Service: Pulmonary   • CATARACT EXTRACTION EXTRACAPSULAR W/ INTRAOCULAR LENS IMPLANTATION     • COLONOSCOPY      2013   • D & C WITH SUCTION     • HYSTERECTOMY     • KNEE ARTHROSCOPY Left          FAMILY HISTORY  Family History   Problem Relation Age of Onset   • Hypertension Mother    • Stroke Mother    • Hypertension Father    • Cancer Son    • Cancer Brother          SOCIAL HISTORY  Social History     Socioeconomic History   • Marital status:      Spouse name: Not on file   • Number of children: Not on file   • Years of education: Not on file   • Highest education level: Not on file   Tobacco Use   • Smoking status: Never Smoker   • Smokeless tobacco: Never Used   • Tobacco comment: caffiene daily   Substance and Sexual Activity   • Alcohol use: Yes     Alcohol/week: 2.0 standard drinks     Types: 1 Glasses of wine, 1 Shots of liquor per week     Comment: occasional   • Drug use: No   • Sexual activity: Yes     Partners: Male         ALLERGIES  Amlodipine besylate, Aspirin, Bactrim [sulfamethoxazole-trimethoprim], Erythromycin, Levaquin [levofloxacin], Macrobid [nitrofurantoin], and Ramipril        REVIEW OF SYSTEMS  All systems reviewed and negative except for those discussed in HPI.       PHYSICAL EXAM    I have reviewed the triage vital signs and nursing notes.    ED Triage Vitals [07/12/21 0955]   Temp Heart Rate Resp BP SpO2   97.9 °F (36.6 °C) 85 16 -- 96 %      Temp src Heart Rate Source Patient Position BP  Location FiO2 (%)   Tympanic -- -- -- --       Physical Exam\    GENERAL: Alert, oriented, not distressed  HENT: head atraumatic, no cervical tenderness or vertebral step-off  EYES: no scleral icterus, EOMI  CV: regular rhythm, regular rate, no murmur  RESPIRATORY: normal effort, CTA  ABDOMEN: soft, nontender  MUSCULOSKELETAL: Laceration to left olecranon elbow.  Painless full range of motion.  Neurovascular intact distally.  Multiple skin tears to right upper extremity.  No bony tenderness.  Full range of motion.  Lower extremities are normal.  NEURO: alert, moves all extremities, follows commands  SKIN: warm, dry        LAB RESULTS  Recent Results (from the past 24 hour(s))   Protime-INR    Collection Time: 07/12/21 10:56 AM    Specimen: Blood   Result Value Ref Range    Protime 44.7 (H) 11.7 - 14.2 Seconds    INR 4.79 (C) 0.90 - 1.10       Ordered the above labs and independently reviewed the results.        RADIOLOGY  CT Cervical Spine Without Contrast, CT Head Without Contrast    Result Date: 7/12/2021  CT SCAN OF THE HEAD AND CERVICAL SPINE WITHOUT CONTRAST  CLINICAL HISTORY: Fall hitting the back of the head. Headache and neck pain.  TECHNIQUE: CT scan of the head was obtained with 2 mm axial bone algorithm and 3 mm axial soft tissue algorithm images. No intravenous contrast was administered.  FINDINGS:  There is no evidence for a calvarial fracture. There is no evidence for an acute extra-axial hemorrhage. The ventricles, sulci, and cisterns are age appropriate. The gray-white matter differentiation is within limits. Changes of chronic small vessel ischemic phenomena are incidentally appreciated. The basal ganglia and thalami are unremarkable. Atherosclerotic changes are appreciated within the intracranial vasculature.  Incidental note is made of mucosal thickening within the ethmoid air cells and the right maxillary sinus. Partial opacification of the right posterior ethmoid air cells is seen with a mucous  retention cyst or secretions.       No evidence for acute traumatic intracranial pathology.  TECHNIQUE: CT scan of the cervical spine was obtained with 1 mm axial bone algorithm and 2 mm axial soft tissue algorithm images. Sagittal and coronal reconstructed images were obtained.  FINDINGS:  There is no evidence for acute fracture or bony malalignment involving the cervical spine.  At C2-3 and C3-4, there is no significant canal or foraminal stenosis.  At C4-5, there is degenerative anterior spondylolisthesis of C4 on C5 by approximately 3 mm. There is mild bilateral foraminal narrowing secondary to a combination of uncovertebral joint hypertrophy and facet arthrosis.  At C5-6, there are advanced degenerative disc changes. Degenerative endplate sclerotic changes are identified. There is a mild degree of right and a mild-to-moderate degree of left foraminal narrowing secondary to uncovertebral joint hypertrophy. A disc osteophyte complex results in a mild degree of canal stenosis.  At C6-7, again there are prominent degenerative disc changes. Adjacent degenerative endplate and sclerotic changes are identified. There is no significant degree of canal or foraminal stenosis.  At C7-T1, there is degenerative anterior spondylolisthesis of C7 on T1 by approximately 2 mm. No significant bony canal or foraminal stenosis is identified.  IMPRESSION:  There is no evidence for acute fracture or bony malalignment involving the cervical spine.  Multilevel degenerative phenomena within the cervical spine are incidentally noted and discussed in detail above.    Radiation dose reduction techniques were utilized, including automated exposure control and exposure modulation based on body size.  This report was finalized on 7/12/2021 1:13 PM by Dr. Alex Bentley M.D.        I ordered the above noted radiological studies. Reviewed by me and discussed with radiologist.  See dictation for official radiology interpretation.      MEDICATIONS  GIVEN IN ER    Medications   Tdap (BOOSTRIX) injection 0.5 mL (0.5 mL Intramuscular Given 7/12/21 1317)   lidocaine 1% - EPINEPHrine 1:797198 (XYLOCAINE W/EPI) 1 %-1:807040 injection 10 mL (10 mL Injection Given by Other 7/12/21 1204)   HYDROcodone-acetaminophen (NORCO) 7.5-325 MG per tablet 1 tablet (1 tablet Oral Given 7/12/21 1202)   ondansetron ODT (ZOFRAN-ODT) disintegrating tablet 4 mg (4 mg Oral Given 7/12/21 1202)       Laceration Repair    Date/Time: 7/12/2021 4:28 PM  Performed by: Abel Humphrey PA  Authorized by: Jose Lopez MD     Consent:     Consent obtained:  Verbal    Consent given by:  Patient  Anesthesia (see MAR for exact dosages):     Anesthesia method:  Local infiltration    Local anesthetic:  Lidocaine 1% WITH epi  Laceration details:     Location:  Shoulder/arm    Shoulder/arm location:  L elbow    Length (cm):  4.8  Repair type:     Repair type:  Intermediate  Exploration:     Hemostasis achieved with:  Epinephrine    Wound exploration: wound explored through full range of motion and entire depth of wound probed and visualized      Wound extent: foreign bodies/material      Foreign bodies/material:  Gravel, dirt    Contaminated: yes    Treatment:     Area cleansed with:  Hibiclens    Amount of cleaning:  Extensive    Irrigation solution:  Sterile saline    Visualized foreign bodies/material removed: yes    Skin repair:     Repair method:  Sutures    Suture size:  4-0    Suture material:  Nylon    Suture technique:  Simple interrupted    Number of sutures:  8  Approximation:     Approximation:  Close  Post-procedure details:     Dressing:  Antibiotic ointment    Patient tolerance of procedure:  Tolerated well, no immediate complications          PROGRESS, DATA ANALYSIS, CONSULTS, AND MEDICAL DECISION MAKING    All labs have been independently reviewed by me.  All radiology studies have been reviewed by me and discussed with radiologist dictating the report.   EKG's independently viewed  and interpreted by me.  Discussion below represents my analysis of pertinent findings related to patient's condition, differential diagnosis, treatment plan and final disposition.    I have discussed case with Dr. Lopez, emergency room physician.  He has performed his own bedside examination and agrees with treatment plan.    ED Course as of Jul 12 1630   Mon Jul 12, 2021   1024 Patient reports mechanical fall with skin tears to bilateral arms, laceration to left elbow, and possible head injury.  Patient is anticoagulated for history of paroxysmal atrial fibrillation.  Plan to obtain head/cervical spine CT to rule out intracranial hemorrhage/fracture.    [EE]   1330 We will have the patient stop her Coumadin for the next 3 days, and then restart.  Patient does have a home monitoring kit to retest.   INR(!!): 4.79 [EE]   1330 Extensive skin tears to right upper extremity have been cleaned, and Steri-Stripped.  Patient has no neuro deficits.  She has no other bony tenderness.  Plan to discharge.  I have discussed the case with our CCP nurse.  They have set up home health to help with her wound care.    [EE]      ED Course User Index  [EE] Abel Humphrey PA       AS OF 16:30 EDT VITALS:    BP - 157/71  HR - 85  TEMP - 97.9 °F (36.6 °C) (Tympanic)  O2 SATS - 96%        DIAGNOSIS  Final diagnoses:   Skin tear of right upper arm without complication, initial encounter   Laceration of left elbow, initial encounter   Injury of head, initial encounter         DISPOSITION  Discharged           Abel Humphrey PA  07/12/21 1630

## 2021-07-12 NOTE — DISCHARGE PLACEMENT REQUEST
"Svetlana Reyes (89 y.o. Female)     Date of Birth Social Security Number Address Home Phone MRN    08/07/1930  [id number]  [facility] [address] [phone] [id number]    Pentecostal Marital Status          Congregation        Admission Date Admission Type Admitting Provider Attending Provider Department, Room/Bed    7/12/21 Emergency  Jose Lopez MD [facility] Emergency Department, 03/03    Discharge Date Discharge Disposition Discharge Destination                       Attending Provider: Jose Lopez MD    Allergies: Amlodipine Besylate, Aspirin, Bactrim [Sulfamethoxazole-trimethoprim], Erythromycin, Levaquin [Levofloxacin], Macrobid [Nitrofurantoin], Ramipril    Isolation: None   Infection: None   Code Status: Prior    Ht: 157.5 cm (62\")   Wt: 59 kg (130 lb)    Admission Cmt: None   Principal Problem: None                Active Insurance as of 7/12/2021     Primary Coverage     Payor Plan Insurance Group Employer/Plan Group    MEDICARE MEDICARE A & B      Payor Plan Address Payor Plan Phone Number Payor Plan Fax Number Effective Dates    [address] [phone]  8/1/1995 - None Entered    [facility] [id number]       Subscriber Name Subscriber Birth Date Member ID       SVETLANA REYES 8/7/1930 [id number]           Secondary Coverage     Payor Plan Insurance Group Employer/Plan Group    AARP MC SUP AARP HEALTH CARE OPTIONS PLAN F     Payor Plan Address Payor Plan Phone Number Payor Plan Fax Number Effective Dates    [facility] [phone]  7/1/2014 - None Entered    [address]       [facility] [address]       Subscriber Name Subscriber Birth Date Member ID       SVETLANA REYES 8/7/1930 [id number]                 Emergency Contacts      (Rel.) Home Phone Work Phone Mobile Phone    Michelle Zaldivar (Daughter) [phone] -- [phone]    Handy Reyes (Son) [phone] -- --    Isaias Zaldivar (son n law) (Relative) [phone] -- --              "

## 2021-07-12 NOTE — ED NOTES
Pt to ER by car from home c/o fall with multiple skin avulsions to bilateral arms. Pt did not hit her head, did not lose consciousness, does take Warfarin.    This RN is wearing mask, gloves and goggles at all times during all patient interactions.     Tres Love RN  07/12/21 1089

## 2021-07-12 NOTE — ED PROVIDER NOTES
MD ATTESTATION NOTE    The JOHANA and I have discussed this patient's history, physical exam, and treatment plan.  I have reviewed the documentation and personally had a face to face interaction with the patient. I affirm the documentation and agree with the treatment and plan.  The attached note describes my personal findings.      Agnieszka Rizzo is a 89 y.o. female who presents to the ED c/o a mechanical fall.  She reports she slipped on the wet concrete and hit her right arm on the ground.  She reports multiple wounds to her right arm.  She reports she thinks that she grazed her head.  She is on blood thinners.  She does not know when she last had a tetanus shot.  She takes Coumadin.  She reports multiple wounds to her right upper extremity.  She denies loss of consciousness.  She reports a wound to her left elbow and her left knee.      On exam:  GENERAL: Awake, alert, no acute distress  SKIN: Warm, dry  HENT: Normocephalic, atraumatic  EYES: no scleral icterus  CV: regular rhythm, regular rate  RESPIRATORY: normal effort, lungs clear  ABDOMEN: soft, non-tender, non-distended  MUSCULOSKELETAL: no deformity.  She has an open wound to her left elbow.  Multiple skin tears to her right upper extremity.  No deep wounds visible.  She has normal range of motion of her left elbow.  Pulses strong bilaterally.  NEURO: alert, moves all extremities, follows commands    Labs  No results found for this or any previous visit (from the past 24 hour(s)).    Radiology  No Radiology Exams Resulted Within Past 24 Hours    Medical Decision Making:  ED Course as of Jul 12 1037   Mon Jul 12, 2021   1024 Patient reports mechanical fall with skin tears to bilateral arms, laceration to left elbow, and possible head injury.  Patient is anticoagulated for history of paroxysmal atrial fibrillation.  Plan to obtain head/cervical spine CT to rule out intracranial hemorrhage/fracture.    [EE]      ED Course User Index  [EE] Abel Humphrey PA        Plan to update her tetanus shot.  Her right upper extremity wounds are not sewable.  Her left elbow wound could possibly use some stitches.  Plan to clean the wounds and further evaluate.  Plan to CAT scan her head and check her INR.    PPE: Both the patient and I wore a surgical mask throughout the entire patient encounter. I wore protective goggles.     Diagnosis  Final diagnoses:   None        Jose Lopez MD  07/12/21 1038

## 2021-07-13 ENCOUNTER — TELEPHONE (OUTPATIENT)
Dept: INTERNAL MEDICINE | Facility: CLINIC | Age: 86
End: 2021-07-13

## 2021-07-13 ENCOUNTER — PATIENT OUTREACH (OUTPATIENT)
Dept: CASE MANAGEMENT | Facility: OTHER | Age: 86
End: 2021-07-13

## 2021-07-13 ENCOUNTER — HOME CARE VISIT (OUTPATIENT)
Dept: HOME HEALTH SERVICES | Facility: HOME HEALTHCARE | Age: 86
End: 2021-07-13

## 2021-07-13 VITALS
HEART RATE: 84 BPM | BODY MASS INDEX: 23.19 KG/M2 | HEIGHT: 62 IN | RESPIRATION RATE: 18 BRPM | DIASTOLIC BLOOD PRESSURE: 60 MMHG | TEMPERATURE: 97.7 F | WEIGHT: 126 LBS | SYSTOLIC BLOOD PRESSURE: 118 MMHG | OXYGEN SATURATION: 96 %

## 2021-07-13 PROCEDURE — G0299 HHS/HOSPICE OF RN EA 15 MIN: HCPCS

## 2021-07-13 NOTE — TELEPHONE ENCOUNTER
Alexia notified and expressed understanding.  She was also notified that the physical therapy is okay per Dr. Rose

## 2021-07-13 NOTE — TELEPHONE ENCOUNTER
Caller: [facility]    Relationship: Home Health    Best call back number: [phone]    What orders are you requesting (i.e. lab or imaging): VASELINE GAUZE AND PHYSICAL THERAPY    In what timeframe would the patient need to come in: ASAP    Where will you receive your lab/imaging services:     Additional notes: NURSE IS REQUESTING THE APPROVAL TO USE VASELINE GAUZE ON PATIENT'S OPEN WOUND ON HER ARM. NURSE STATED THE PERVIOUS BANDAGE STUCK TO THE WOUND. PLEASE ADVISE.   NURSE IS ALSO REQUESTING PHYSICAL THERAPY FOR PATIENT AS WELL.

## 2021-07-13 NOTE — OUTREACH NOTE
Ambulatory Case Management Note    Patient Outreach  Talked with patient. Discussed 7/12/21 ED visit regarding skin tear of right  upper arm due to fall.  Patient states to be compliant with ED recommendations; sutures intact to left elbow; SN home health visit today for dressing change to right upper arm. Patient states home health to return tomorrow to instruct son on dressing change. Patient states sutures intact;; no difficulty with redness; warmth or drainage. Patent states no difficulty with fever; headache; chest pain; SOB; appetite or sleeping. Patent has discontinued Warfarin as directed for 3 days due to  INR 4.79. and will resume on Thursday 7/15/21. She states she will check PT/INR tomorrow.Patient has Life Alert device. Patient has 7/16/21 PCP appointment. Reviewed with patient ED AVS recommendations;education; fall; safety and bleeding precautions; Life Alert;  24/7 Nurse Line Telephone number; ACM contact information and Case Management services. Patient verbalized understanding and states to appreciate phone call.  No further questions or concerns voiced at this time.      Ria Hinton RN  Ambulatory Case Management    7/13/2021, 16:24 EDT

## 2021-07-14 ENCOUNTER — HOME CARE VISIT (OUTPATIENT)
Dept: HOME HEALTH SERVICES | Facility: HOME HEALTHCARE | Age: 86
End: 2021-07-14

## 2021-07-14 VITALS
HEART RATE: 20 BPM | DIASTOLIC BLOOD PRESSURE: 60 MMHG | SYSTOLIC BLOOD PRESSURE: 118 MMHG | TEMPERATURE: 97.3 F | RESPIRATION RATE: 18 BRPM | OXYGEN SATURATION: 98 %

## 2021-07-14 VITALS
TEMPERATURE: 97.2 F | HEART RATE: 81 BPM | DIASTOLIC BLOOD PRESSURE: 50 MMHG | SYSTOLIC BLOOD PRESSURE: 100 MMHG | OXYGEN SATURATION: 96 %

## 2021-07-14 LAB — INR PPP: 2.6

## 2021-07-14 PROCEDURE — G0151 HHCP-SERV OF PT,EA 15 MIN: HCPCS

## 2021-07-14 PROCEDURE — G0299 HHS/HOSPICE OF RN EA 15 MIN: HCPCS

## 2021-07-14 PROCEDURE — G0300 HHS/HOSPICE OF LPN EA 15 MIN: HCPCS

## 2021-07-14 NOTE — HOME HEALTH
Reason for referral Patient with decreased strength, decreased balance, abnormal gait, recent falls in the last 6 weeks     Diagnosis Patient went to ER 7/12/21 following a fall     surgical procedure na    PMHx HTN, CAD, A fib, CHF, asthma, COPD, C diff , macular degeneration   Patient uses o2 at night at 2liters     Prior level of function Patient resides alone, independent with ADLs, IADLs, uses cane when outside, does not drive.    Home social environment Patient has 2 steps to enter home with rail.     Next MD appointment 7/16/21

## 2021-07-15 ENCOUNTER — HOME CARE VISIT (OUTPATIENT)
Dept: HOME HEALTH SERVICES | Facility: HOME HEALTHCARE | Age: 86
End: 2021-07-15

## 2021-07-15 VITALS
HEART RATE: 86 BPM | DIASTOLIC BLOOD PRESSURE: 82 MMHG | TEMPERATURE: 98.1 F | SYSTOLIC BLOOD PRESSURE: 130 MMHG | OXYGEN SATURATION: 98 % | RESPIRATION RATE: 18 BRPM

## 2021-07-15 PROCEDURE — G0299 HHS/HOSPICE OF RN EA 15 MIN: HCPCS

## 2021-07-15 PROCEDURE — G0300 HHS/HOSPICE OF LPN EA 15 MIN: HCPCS

## 2021-07-15 NOTE — HOME HEALTH
Wound care    Patient has extensive wounds on both arms r/t recent fall.    L arm has sutures /  Right arm has steri strips.     Both arm wounds-- remove very carefully, soaked with normal saline to help remove areas of sticking due to drainage.    clean with normal saline,   covere with a vaseline gauze, 4 x 4 and secure with gauze.

## 2021-07-16 ENCOUNTER — HOME CARE VISIT (OUTPATIENT)
Dept: HOME HEALTH SERVICES | Facility: HOME HEALTHCARE | Age: 86
End: 2021-07-16

## 2021-07-16 ENCOUNTER — ANTICOAGULATION VISIT (OUTPATIENT)
Dept: PHARMACY | Facility: HOSPITAL | Age: 86
End: 2021-07-16

## 2021-07-16 ENCOUNTER — OFFICE VISIT (OUTPATIENT)
Dept: INTERNAL MEDICINE | Facility: CLINIC | Age: 86
End: 2021-07-16

## 2021-07-16 VITALS
TEMPERATURE: 96.9 F | SYSTOLIC BLOOD PRESSURE: 114 MMHG | OXYGEN SATURATION: 92 % | DIASTOLIC BLOOD PRESSURE: 50 MMHG | HEART RATE: 84 BPM

## 2021-07-16 VITALS
SYSTOLIC BLOOD PRESSURE: 116 MMHG | DIASTOLIC BLOOD PRESSURE: 50 MMHG | WEIGHT: 128.3 LBS | HEART RATE: 82 BPM | BODY MASS INDEX: 23.61 KG/M2 | OXYGEN SATURATION: 95 % | HEIGHT: 62 IN

## 2021-07-16 DIAGNOSIS — T14.8XXA MULTIPLE SKIN TEARS: ICD-10-CM

## 2021-07-16 DIAGNOSIS — I48.91 ATRIAL FIBRILLATION, UNSPECIFIED TYPE (HCC): ICD-10-CM

## 2021-07-16 DIAGNOSIS — I48.19 PERSISTENT ATRIAL FIBRILLATION (HCC): ICD-10-CM

## 2021-07-16 DIAGNOSIS — I48.19 PERSISTENT ATRIAL FIBRILLATION (HCC): Primary | ICD-10-CM

## 2021-07-16 DIAGNOSIS — S41.112D LACERATION OF LEFT UPPER EXTREMITY, SUBSEQUENT ENCOUNTER: Primary | ICD-10-CM

## 2021-07-16 DIAGNOSIS — Z51.81 ALTERATION IN ANTICOAGULATION: ICD-10-CM

## 2021-07-16 DIAGNOSIS — Z79.01 ALTERATION IN ANTICOAGULATION: ICD-10-CM

## 2021-07-16 PROBLEM — S41.112A LACERATION OF LEFT UPPER EXTREMITY: Status: ACTIVE | Noted: 2021-07-16

## 2021-07-16 LAB — INR PPP: 3.5 (ref 0.9–1.1)

## 2021-07-16 PROCEDURE — 85610 PROTHROMBIN TIME: CPT | Performed by: FAMILY MEDICINE

## 2021-07-16 PROCEDURE — 36416 COLLJ CAPILLARY BLOOD SPEC: CPT | Performed by: FAMILY MEDICINE

## 2021-07-16 PROCEDURE — 99213 OFFICE O/P EST LOW 20 MIN: CPT | Performed by: FAMILY MEDICINE

## 2021-07-16 PROCEDURE — G0151 HHCP-SERV OF PT,EA 15 MIN: HCPCS

## 2021-07-16 RX ORDER — CEFTRIAXONE 1 G/1
1 INJECTION, POWDER, FOR SOLUTION INTRAMUSCULAR; INTRAVENOUS
COMMUNITY
End: 2021-07-16

## 2021-07-16 RX ORDER — TOBRAMYCIN INHALATION SOLUTION 300 MG/5ML
300 INHALANT RESPIRATORY (INHALATION)
COMMUNITY
End: 2023-01-05 | Stop reason: ALTCHOICE

## 2021-07-16 NOTE — PROGRESS NOTES
"Chief Complaint  [facility] ER follow up to fall    Subjective          Agnieszka Rizzo presents to [facility]  History of Present Illness  Patient follows up after emergency department visit for laceration left arm with skin tears in the right arm secondary to fall  Objective   Vital Signs:   /50 (BP Location: Left arm, Patient Position: Sitting, Cuff Size: Adult)   Pulse 82   Ht 157.5 cm (62\")   Wt 58.2 kg (128 lb 4.8 oz)   SpO2 95%   BMI 23.47 kg/m²     Physical Exam  Constitutional:       Appearance: Normal appearance.   Cardiovascular:      Rate and Rhythm: Rhythm irregular.   Pulmonary:      Effort: Pulmonary effort is normal.      Breath sounds: Normal breath sounds.   Skin:     Comments: Laceration with interrupted suture repair left arm wound clean dry dressing changed  Skin tears right arm Steri-Strips present minimal erythema with ecchymosis  Right arm dressing changed as well   Neurological:      Mental Status: She is alert.   Psychiatric:         Mood and Affect: Mood normal.         Behavior: Behavior normal.         Thought Content: Thought content normal.         Judgment: Judgment normal.        Result Review :                 Assessment and Plan    Diagnoses and all orders for this visit:    1. Laceration of left upper extremity, subsequent encounter (Primary)  -     POC INR    2. Multiple skin tears  Comments:  Right arm  Orders:  -     POC INR    3. Persistent atrial fibrillation (CMS/HCC)  -     POC INR    4. Alteration in anticoagulation  -     POC INR    5. Atrial fibrillation, unspecified type (CMS/HCC)       Use a cane assistive ambulating device or walker  INR 3.4 adjust warfarin 3 mg Monday Thursday 4 and half milligrams every other day monitored through home INRs    Follow Up   Return in about 10 days (around 7/26/2021), or if symptoms worsen or fail to improve.  Patient was given instructions and counseling regarding her condition or for health " maintenance advice. Please see specific information pulled into the AVS if appropriate.

## 2021-07-16 NOTE — PROGRESS NOTES
Anticoagulation Clinic Progress Note    Anticoagulation Summary  As of 2021    INR goal:  2.0-3.0   TTR:  64.2 % (2.8 y)   INR used for dosin.60 (2021)   Warfarin maintenance plan:  3 mg every Mon, Thu; 4.5 mg all other days   Weekly warfarin total:  28.5 mg   Plan last modified:  Marlene Herrmann [facility] (2021)   Next INR check:  2021   Priority:  High   Target end date:      Indications    Persistent atrial fibrillation (CMS/HCC) [I48.19]             Anticoagulation Episode Summary     INR check location:      Preferred lab:      Send INR reminders to:   [facility] CLINICAL POOL    Comments:  **Acelis - ok to only call when out of range**      Anticoagulation Care Providers     Provider Role Specialty Phone number    Marcie Robbins MD Referring Cardiology [phone]          Clinic Interview:  Patient Findings     Positives:  Change in health, Change in medications    Negatives:  Signs/symptoms of thrombosis, Signs/symptoms of bleeding,   Laboratory test error suspected, Change in alcohol use, Change in   activity, Upcoming invasive procedure, Emergency department visit,   Upcoming dental procedure, Missed doses, Extra doses, Change in   diet/appetite, Hospital admission, Bruising, Other complaints    Comments:  Fell - supra therapeutic INR, home test discrepancies with   home maxx and Hen[place] lab. Was taking pain medications prn from dental   procedure last month but is no longer taking       Clinical Outcomes     Negatives:  Major bleeding event, Thromboembolic event,   Anticoagulation-related hospital admission, Anticoagulation-related ED   visit, Anticoagulation-related fatality    Comments:  Fell - supra therapeutic INR, home test discrepancies with   home maxx and [place] lab. Was taking pain medications prn from dental   procedure last month but is no longer taking         INR History:  Anticoagulation Monitoring 2021   INR 2.60 3.50 2.60   INR  Date 7/7/2021 7/16/2021 7/14/2021   INR Goal 2.0-3.0 2.0-3.0 2.0-3.0   Trend Same Same Down   Last Week Total 30 mg 22.5 mg 22.5 mg   Next Week Total 30 mg 30 mg 22.5 mg   Sun 4.5 mg 4.5 mg 4.5 mg   Mon 3 mg 3 mg -   Tue 4.5 mg 4.5 mg -   Wed 4.5 mg 4.5 mg -   Thu 4.5 mg 4.5 mg -   Fri 4.5 mg 4.5 mg Hold (7/16)   Sat 4.5 mg 4.5 mg 3 mg (7/17)   Visit Report - - -   Some recent data might be hidden       Plan:  1. INR is Supratherapeutic today- see above in Anticoagulation Summary.   Will instruct Agnieszka Rizzo to Change their warfarin regimen- see above in Anticoagulation Summary.  2. Follow up in 4 days  3. Pt has agreed to only be called if INR out of range. They have been instructed to call if any changes in medications, doses, concerns, etc. Patient expresses understanding and has no further questions at this time.    Marlene Herrmann, [facility]

## 2021-07-19 ENCOUNTER — ANTICOAGULATION VISIT (OUTPATIENT)
Dept: PHARMACY | Facility: HOSPITAL | Age: 86
End: 2021-07-19

## 2021-07-19 ENCOUNTER — HOME CARE VISIT (OUTPATIENT)
Dept: HOME HEALTH SERVICES | Facility: HOME HEALTHCARE | Age: 86
End: 2021-07-19

## 2021-07-19 DIAGNOSIS — I48.19 PERSISTENT ATRIAL FIBRILLATION (HCC): Primary | ICD-10-CM

## 2021-07-19 LAB — INR PPP: 3.4

## 2021-07-19 PROCEDURE — G0300 HHS/HOSPICE OF LPN EA 15 MIN: HCPCS

## 2021-07-19 PROCEDURE — G0299 HHS/HOSPICE OF RN EA 15 MIN: HCPCS

## 2021-07-19 NOTE — PROGRESS NOTES
Anticoagulation Clinic Progress Note    Anticoagulation Summary  As of 7/19/2021    INR goal:  2.0-3.0   TTR:  64.1 % (2.8 y)   INR used for dosing:  3.40 (7/19/2021)   Warfarin maintenance plan:  3 mg every Mon; 4.5 mg all other days   Weekly warfarin total:  30 mg   Plan last modified:  Marlene Herrmann, [facility] (7/16/2021)   Next INR check:  7/21/2021   Priority:  High   Target end date:      Indications    Persistent atrial fibrillation (CMS/HCC) [I48.19]             Anticoagulation Episode Summary     INR check location:      Preferred lab:      Send INR reminders to:   [facility] CLINICAL POOL    Comments:  **Acelis - ok to only call when out of range**      Anticoagulation Care Providers     Provider Role Specialty Phone number    Marcie Robbins MD Referring Cardiology [phone]        Findings:  Reports she followed Dr. Rose's instructions, who advised her to HOLD 4/16/21 and decrease to 3 mg Mon/Thurs.     INR History:  Anticoagulation Monitoring 7/16/2021 7/16/2021 7/19/2021   INR 3.50 2.60 3.40   INR Date 7/16/2021 7/14/2021 7/19/2021   INR Goal 2.0-3.0 2.0-3.0 2.0-3.0   Trend Same Down Same   Last Week Total 22.5 mg 22.5 mg 18 mg   Next Week Total 30 mg 22.5 mg 27 mg   Sun 4.5 mg 4.5 mg -   Mon 3 mg - 1.5 mg (7/19)   Tue 4.5 mg - 3 mg (7/20)   Wed 4.5 mg - -   Thu 4.5 mg - -   Fri 4.5 mg Hold (7/16) -   Sat 4.5 mg 3 mg (7/17) -   Visit Report - - -   Some recent data might be hidden       Plan:  1. INR is Supratherapeutic today- see above in Anticoagulation Summary.   Provided instructions to Larisa with [facility] to Change their warfarin regimen- see above in Anticoagulation Summary. Also spoke directly to Ms. Rizzo.   2. Follow up in 2 days      Lev Mckeon [facility]

## 2021-07-20 ENCOUNTER — HOME CARE VISIT (OUTPATIENT)
Dept: HOME HEALTH SERVICES | Facility: HOME HEALTHCARE | Age: 86
End: 2021-07-20

## 2021-07-20 VITALS
TEMPERATURE: 96.6 F | OXYGEN SATURATION: 95 % | SYSTOLIC BLOOD PRESSURE: 120 MMHG | DIASTOLIC BLOOD PRESSURE: 50 MMHG | HEART RATE: 76 BPM

## 2021-07-20 VITALS
SYSTOLIC BLOOD PRESSURE: 130 MMHG | RESPIRATION RATE: 18 BRPM | HEART RATE: 78 BPM | DIASTOLIC BLOOD PRESSURE: 80 MMHG | OXYGEN SATURATION: 97 % | TEMPERATURE: 46 F

## 2021-07-20 PROCEDURE — G0151 HHCP-SERV OF PT,EA 15 MIN: HCPCS

## 2021-07-20 NOTE — HOME HEALTH
Patient reports she wants to get stronger. Patient reports she is getting stronger in her arms and can get up and down off the commode better.

## 2021-07-20 NOTE — HOME HEALTH
Nurse to perform dressing changes and obtain Protime. Pt. has her on protime machine and question if she accurantly obtainng her protime. Nurse observed. Pt. wash and rinse hands under warm water. Clean and dried hands. Noted patient apply blood on top of test strip. Nurse educated to apply blood sample via fingerstick on side. Nurse educated factors that may result inaccurate results-Using a blood sample that is too small or too large. Waiting too long to test after taking a fingerstick, Faulty test strips, caused by not storing test strips according to the ’s instructions. Not storing, maintaining or cleaning the meter. INR 3.4 and sent to Naomi in office.Pt. requested to know days nurse's visits. Nurse wrote snv on calendar.     Next visit:  Any new orders from protime  Wound care

## 2021-07-21 ENCOUNTER — PATIENT OUTREACH (OUTPATIENT)
Dept: CASE MANAGEMENT | Facility: OTHER | Age: 86
End: 2021-07-21

## 2021-07-21 ENCOUNTER — HOME CARE VISIT (OUTPATIENT)
Dept: HOME HEALTH SERVICES | Facility: HOME HEALTHCARE | Age: 86
End: 2021-07-21

## 2021-07-21 ENCOUNTER — ANTICOAGULATION VISIT (OUTPATIENT)
Dept: PHARMACY | Facility: HOSPITAL | Age: 86
End: 2021-07-21

## 2021-07-21 DIAGNOSIS — I48.19 PERSISTENT ATRIAL FIBRILLATION (HCC): Primary | ICD-10-CM

## 2021-07-21 LAB — INR PPP: 2.7

## 2021-07-21 PROCEDURE — G0300 HHS/HOSPICE OF LPN EA 15 MIN: HCPCS

## 2021-07-21 PROCEDURE — G0299 HHS/HOSPICE OF RN EA 15 MIN: HCPCS

## 2021-07-21 NOTE — PROGRESS NOTES
Anticoagulation Clinic Progress Note    Anticoagulation Summary  As of 2021    INR goal:  2.0-3.0   TTR:  64.0 % (2.8 y)   INR used for dosin.70 (2021)   Warfarin maintenance plan:  3 mg every Mon, Wed, Fri; 4.5 mg all other days   Weekly warfarin total:  27 mg   Plan last modified:  Lev Mckeon PharmD (2021)   Next INR check:  2021   Priority:  High   Target end date:      Indications    Persistent atrial fibrillation (CMS/HCC) [I48.19]             Anticoagulation Episode Summary     INR check location:      Preferred lab:      [facility]    Comments:  **Acelis - ok to only call when out of range**      Anticoagulation Care Providers     Provider Role Specialty Phone number    Marcie Robbins MD Referring Cardiology [phone]          INR History:  Anticoagulation Monitoring 2021   INR 2.60 3.40 2.70   INR Date 2021   INR Goal 2.0-3.0 2.0-3.0 2.0-3.0   Trend Down Same Down   Last Week Total 22.5 mg 18 mg 22.5 mg   Next Week Total 22.5 mg 27 mg 27 mg   Sun 4.5 mg - 4.5 mg   Mon - 1.5 mg () 3 mg   Tue - 3 mg () -   Wed - - 3 mg ()   Thu - - 4.5 mg   Fri Hold () - 3 mg   Sat 3 mg () - 4.5 mg   Visit Report - - -   Some recent data might be hidden       Plan:  1. INR is Therapeutic today- see above in Anticoagulation Summary.   Provided instructions to Larisa with [facility] to Change their warfarin regimen- see above in Anticoagulation Summary.  2. Follow up in 6 days      Lev Mckeon PharmD

## 2021-07-21 NOTE — OUTREACH NOTE
Ambulatory Case Management Note    Patient Outreach  Talked with patient. Patient states to continue with home health services. She states to continue with dressing changes to right upper arm and sutures intact to left elbow and has 7/29/21 PCP appointment. She states no difficulty with redness; warm or drainage. States to have completed PT/INR today ; compliant with medications and medical appointments. Reviewed with patient education and appointments. Patient verbalized understanding and states to appreciate phone call. No further questions or concerns voiced at this time.            Ria Hinton RN  Ambulatory Case Management    7/21/2021, 12:17 EDT

## 2021-07-22 ENCOUNTER — TELEPHONE (OUTPATIENT)
Dept: CARDIOLOGY | Facility: CLINIC | Age: 86
End: 2021-07-22

## 2021-07-22 ENCOUNTER — HOME CARE VISIT (OUTPATIENT)
Dept: HOME HEALTH SERVICES | Facility: HOME HEALTHCARE | Age: 86
End: 2021-07-22

## 2021-07-22 VITALS
SYSTOLIC BLOOD PRESSURE: 130 MMHG | RESPIRATION RATE: 18 BRPM | OXYGEN SATURATION: 96 % | DIASTOLIC BLOOD PRESSURE: 60 MMHG | TEMPERATURE: 97.4 F | HEART RATE: 76 BPM

## 2021-07-22 VITALS
TEMPERATURE: 97.6 F | HEART RATE: 74 BPM | SYSTOLIC BLOOD PRESSURE: 110 MMHG | OXYGEN SATURATION: 94 % | DIASTOLIC BLOOD PRESSURE: 50 MMHG

## 2021-07-22 PROCEDURE — G0151 HHCP-SERV OF PT,EA 15 MIN: HCPCS

## 2021-07-22 NOTE — HOME HEALTH
Pt. performed protime with her machine. Patient continues to need cuing with applying blood on strip. Noted patient done better this visit in obtaining protime.  INR result 2.7 sent to Davida in office. Pt. voiced she do consulted with her PCP and questioning why she has to continue to take Coumadin d/t she is no longer in Afib. Patient voiced her daughter will be consulting with cardiologist. Nurse change dressing as ordered. Noted sutures in place to left elbow and area healing well. R arm steri-strips in place and few stri-strips dislodge and removed. Noted s/t  healing remarkable. No drainage noted. Nurse called  office and spoke with Leonela regarding progression of patient wounds.Informed wounds healing remarkable. Order for dressing to be decreased in frequency or may leave NEO. Patient has appointment on 28th with Dr. Rose office.    Next visit:  Discuss discharge  Decrease dressing changes or leave NEO

## 2021-07-22 NOTE — TELEPHONE ENCOUNTER
Please call and relay. She Needs blood thinner for stroke prevention given a fib. Alternatives are eliquis or Xarelto however, please inform her all blood thinners pose risk of bruising/ bleeding. She could check with insurance to see if switching to either alternative seems feasible. If not, I recommend continuing warfarin to prevent stroke risk unless they want to decide to stop all anticoagulation and accept stroke risk to decrease bruising side effect.

## 2021-07-22 NOTE — CASE COMMUNICATION
Nurse called and spoke to Leonela at Dr. Rose office and discussed progression of patient wound. Leonela made aware wounds healing remarkable nad no drainage noted. No s/s infection. Request for frequency of dressing changes be decreased or NEO. V/O given to either decrease or leave NEO.   Megan patient has appointment for [facility] on Friday. Can you assess and change POC. Also discuss upcoming discharge

## 2021-07-22 NOTE — TELEPHONE ENCOUNTER
The patients daughter called and would like to know if someone could call her about some side effects that she has been having with her warfarin. According to her daughter she has made her skin so thin that she bruises easy and bleeds easy. Does she need to be on it can she been on something else.

## 2021-07-23 ENCOUNTER — HOME CARE VISIT (OUTPATIENT)
Dept: HOME HEALTH SERVICES | Facility: HOME HEALTHCARE | Age: 86
End: 2021-07-23

## 2021-07-23 ENCOUNTER — TELEPHONE (OUTPATIENT)
Dept: INTERNAL MEDICINE | Facility: CLINIC | Age: 86
End: 2021-07-23

## 2021-07-23 VITALS
DIASTOLIC BLOOD PRESSURE: 72 MMHG | RESPIRATION RATE: 20 BRPM | HEART RATE: 60 BPM | SYSTOLIC BLOOD PRESSURE: 140 MMHG | TEMPERATURE: 96.9 F | OXYGEN SATURATION: 96 %

## 2021-07-23 PROCEDURE — G0299 HHS/HOSPICE OF RN EA 15 MIN: HCPCS

## 2021-07-23 NOTE — TELEPHONE ENCOUNTER
Megan ([phone]) with [facility] just wanted to make sure that Dr. Rose was going to remove patient's staples not home health.  Please advise.

## 2021-07-27 ENCOUNTER — HOME CARE VISIT (OUTPATIENT)
Dept: HOME HEALTH SERVICES | Facility: HOME HEALTHCARE | Age: 86
End: 2021-07-27

## 2021-07-27 ENCOUNTER — ANTICOAGULATION VISIT (OUTPATIENT)
Dept: PHARMACY | Facility: HOSPITAL | Age: 86
End: 2021-07-27

## 2021-07-27 VITALS
SYSTOLIC BLOOD PRESSURE: 110 MMHG | HEART RATE: 76 BPM | DIASTOLIC BLOOD PRESSURE: 50 MMHG | OXYGEN SATURATION: 99 % | TEMPERATURE: 97.6 F

## 2021-07-27 VITALS
RESPIRATION RATE: 20 BRPM | SYSTOLIC BLOOD PRESSURE: 110 MMHG | DIASTOLIC BLOOD PRESSURE: 64 MMHG | HEART RATE: 80 BPM | OXYGEN SATURATION: 99 % | TEMPERATURE: 96.9 F

## 2021-07-27 DIAGNOSIS — I48.19 PERSISTENT ATRIAL FIBRILLATION (HCC): Primary | ICD-10-CM

## 2021-07-27 LAB — INR PPP: 3.1

## 2021-07-27 PROCEDURE — G0151 HHCP-SERV OF PT,EA 15 MIN: HCPCS

## 2021-07-27 PROCEDURE — G0299 HHS/HOSPICE OF RN EA 15 MIN: HCPCS

## 2021-07-27 NOTE — PROGRESS NOTES
Anticoagulation Clinic Progress Note    Anticoagulation Summary  As of 7/27/2021    INR goal:  2.0-3.0   TTR:  64.1 % (2.8 y)   INR used for dosing:  3.10 (7/27/2021)   Warfarin maintenance plan:  3 mg every Mon, Wed, Fri; 4.5 mg all other days   Weekly warfarin total:  27 mg   Plan last modified:  Lev Mckeon PharmD (7/21/2021)   Next INR check:  7/30/2021   Priority:  High   Target end date:      Indications    Persistent atrial fibrillation (CMS/HCC) [I48.19]             Anticoagulation Episode Summary     INR check location:      Preferred lab:      [facility]    Comments:  **Acelis - ok to only call when out of range**      Anticoagulation Care Providers     Provider Role Specialty Phone number    Marcie Robbins MD Referring Cardiology [phone]          INR History:  Anticoagulation Monitoring 7/19/2021 7/21/2021 7/27/2021   INR 3.40 2.70 3.10   INR Date 7/19/2021 7/21/2021 7/27/2021   INR Goal 2.0-3.0 2.0-3.0 2.0-3.0   Trend Same Down Same   Last Week Total 18 mg 22.5 mg 25.5 mg   Next Week Total 27 mg 27 mg 25.5 mg   Sun - 4.5 mg -   Mon 1.5 mg (7/19) 3 mg -   Tue 3 mg (7/20) - 3 mg (7/27)   Wed - 3 mg (7/21) 3 mg   Thu - 4.5 mg 4.5 mg   Fri - 3 mg -   Sat - 4.5 mg -   Visit Report - - -   Some recent data might be hidden       Plan:  1. INR is Supratherapeutic today- see above in Anticoagulation Summary.   Provided instructions to Megan with [facility] to Change their warfarin regimen- see above in Anticoagulation Summary.  2. Follow up in 3 days      Lev Mckeon PharmD

## 2021-07-29 ENCOUNTER — OFFICE VISIT (OUTPATIENT)
Dept: INTERNAL MEDICINE | Facility: CLINIC | Age: 86
End: 2021-07-29

## 2021-07-29 VITALS
DIASTOLIC BLOOD PRESSURE: 56 MMHG | OXYGEN SATURATION: 98 % | WEIGHT: 128.8 LBS | HEART RATE: 74 BPM | HEIGHT: 62 IN | SYSTOLIC BLOOD PRESSURE: 124 MMHG | BODY MASS INDEX: 23.7 KG/M2

## 2021-07-29 DIAGNOSIS — I50.32 CHRONIC DIASTOLIC CONGESTIVE HEART FAILURE (HCC): ICD-10-CM

## 2021-07-29 DIAGNOSIS — I48.91 ATRIAL FIBRILLATION, UNSPECIFIED TYPE (HCC): ICD-10-CM
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
## 2021-07-29 DIAGNOSIS — I10 BENIGN ESSENTIAL HYPERTENSION: Primary | ICD-10-CM

## 2021-07-29 DIAGNOSIS — H54.40 BLINDNESS OF RIGHT EYE, UNSPECIFIED LEFT EYE VISUAL IMPAIRMENT CATEGORY: ICD-10-CM

## 2021-07-29 PROCEDURE — G0180 MD CERTIFICATION HHA PATIENT: HCPCS | Performed by: FAMILY MEDICINE

## 2021-07-29 PROCEDURE — G0439 PPPS, SUBSEQ VISIT: HCPCS | Performed by: FAMILY MEDICINE

## 2021-07-29 PROCEDURE — 99214 OFFICE O/P EST MOD 30 MIN: CPT | Performed by: FAMILY MEDICINE

## 2021-07-29 NOTE — PROGRESS NOTES
"Chief Complaint  follow up to [facility] and [employer] [facility]-subsequent  Hypertension fall risk  Subjective          Agnieszka Rizzo presents to [facility]  History of Present Illness  Patient follows up for ongoing management of chronic medical problems hypertension which she has had some low blood pressure readings she thinks she is taking the torsemide once a day she is also taking verapamil and losartan she has no chest pain she has had some frequent falls mostly due to hazardous conditions where she is walking but she also realizes that she as she loses her vision in her right eye is difficult for her to have some depth perception she is inquiring about having a walker with 3 wheels because she feels that she would like to maintain her mobility which would help her CHF and lower extremity muscle tone  But she does not want to have increased risk of falling she is followed by ophthalmology as well for ongoing blindness issues.  He is concerned about having to take warfarin and she did not know if there is other type of anticoagulation medication she should take even though she is not in atrial fibrillation very often she wonders the utility of staying anticoagulated  Objective   Vital Signs:   /56 (BP Location: Left arm, Patient Position: Sitting, Cuff Size: Adult)   Pulse 74   Ht 157.5 cm (62\")   Wt 58.4 kg (128 lb 12.8 oz)   SpO2 98%   BMI 23.56 kg/m²     Physical Exam  Vitals and nursing note reviewed.   Constitutional:       Appearance: Normal appearance.   HENT:      Head: Normocephalic and atraumatic.   Eyes:      General: No scleral icterus.  Cardiovascular:      Rate and Rhythm: Normal rate and regular rhythm.   Pulmonary:      Effort: Pulmonary effort is normal.      Breath sounds: Normal breath sounds.   Musculoskeletal:      Right lower leg: No edema.      Left lower leg: No edema.   Skin:     Comments: Left arm laceration with 7 sutures removed dressing " applied   Neurological:      Mental Status: She is alert.   Psychiatric:         Mood and Affect: Mood normal.         Behavior: Behavior normal.         Thought Content: Thought content normal.         Judgment: Judgment normal.        Result Review :     Common labs    Common Labsle 12/29/20 12/29/20 12/29/20 3/4/21 3/4/21    0955 0955 0955 [id number]   Glucose  96   124 (A)   BUN  15   15   Creatinine  0.82   0.88   eGFR Non African Am  65   60 (A)   Sodium  139   139   Potassium  3.6   3.8   Chloride  100   100   Calcium  9.2   9.6   Albumin  4.00   4.40   Total Bilirubin  0.5   0.9   Alkaline Phosphatase  109   108   AST (SGOT)  27   36 (A)   ALT (SGPT)  22   34 (A)   WBC   6.72 13.53 (A)    Hemoglobin   11.7 (A) 13.3    Hematocrit   35.2 40.2    Platelets   270 225    Total Cholesterol 206 (A)       Triglycerides 127       HDL Cholesterol 70 (A)       LDL Cholesterol  114 (A)       (A) Abnormal value                      Assessment and Plan    Diagnoses and all orders for this visit:    1. Benign essential hypertension (Primary)    2. Chronic diastolic congestive heart failure (CMS/HCC)    3. Atrial fibrillation, unspecified type (CMS/HCC)    4. Fall, subsequent encounter    5. Blindness of right eye, unspecified left eye visual impairment category    Hypertension with CHF diastolic having some hypotensive episodes decrease torsemide continue verapamil and losartan monitor blood pressure she also has follow-up with cardiology within the month  Fall prevention with wheeled walker  Blindness continue follow-up with ophthalmology  Signs and symptoms of infection discussed with patient.  She will otherwise follow-up as needed or    Follow Up   Return in about 6 months (around 1/29/2022), or if symptoms worsen or fail to improve, for Recheck.  Patient was given instructions and counseling regarding her condition or for health maintenance advice. Please see specific information pulled into the AVS if appropriate.

## 2021-07-29 NOTE — PROGRESS NOTES
The ABCs of the Annual Wellness Visit  Subsequent Medicare Wellness Visit    Chief Complaint   Patient presents with   • follow up to laceration   • Medicare Wellness-subsequent       Subjective   History of Present Illness:  Agnieszka Rizzo is a 89 y.o. female who presents for a Subsequent Medicare Wellness Visit.    HEALTH RISK ASSESSMENT    Recent Hospitalizations:  No hospitalization(s) within the last year.    Current Medical Providers:  Patient Care Team:  Matt Rose MD as PCP - General (Family Medicine)  Marcie Robbins MD as Consulting Physician (Cardiology)  Nilesh Danielle MD as Consulting Physician (Urology)  Lina Morris RPH as Pharmacist  Lev Mckeon, PharmD as Pharmacist (Pharmacy)  Ria Hinton, RN as Ambulatory  (Population Health)    Smoking Status:  Social History     Tobacco Use   Smoking Status Never Smoker   Smokeless Tobacco Never Used   Tobacco Comment    caffiene daily       Alcohol Consumption:  Social History     Substance and Sexual Activity   Alcohol Use Yes   • Alcohol/week: 2.0 standard drinks   • Types: 1 Glasses of wine, 1 Shots of liquor per week    Comment: occasional       Depression Screen:   PHQ-2/PHQ-9 Depression Screening 7/29/2021   Little interest or pleasure in doing things 0   Feeling down, depressed, or hopeless 0   Trouble falling or staying asleep, or sleeping too much -   Feeling tired or having little energy -   Poor appetite or overeating -   Feeling bad about yourself - or that you are a failure or have let yourself or your family down -   Trouble concentrating on things, such as reading the newspaper or watching television -   Moving or speaking so slowly that other people could have noticed. Or the opposite - being so fidgety or restless that you have been moving around a lot more than usual -   Thoughts that you would be better off dead, or of hurting yourself in some way -   Total Score 0   If you checked off any problems, how  difficult have these problems made it for you to do your work, take care of things at home, or get along with other people? -       Fall Risk Screen:  MICHELLE Fall Risk Assessment was completed, and patient is at HIGH risk for falls. Assessment completed on:7/29/2021    Health Habits and Functional and Cognitive Screening:  Functional & Cognitive Status 7/29/2021   Do you have difficulty preparing food and eating? No   Do you have difficulty bathing yourself, getting dressed or grooming yourself? No   Do you have difficulty using the toilet? No   Do you have difficulty moving around from place to place? No   Do you have trouble with steps or getting out of a bed or a chair? Yes   Current Diet Well Balanced Diet   Dental Exam Up to date   Eye Exam Up to date   Exercise (times per week) 7 times per week   Current Exercises Include Home Exercise Program (TV, Computer, Etc.)   Current Exercise Activities Include -   Do you need help using the phone?  No   Are you deaf or do you have serious difficulty hearing?  No   Do you need help with transportation? Yes   Do you need help shopping? Yes   Do you need help preparing meals?  No   Do you need help with housework?  No   Do you need help with laundry? No   Do you need help taking your medications? No   Do you need help managing money? No   Do you ever drive or ride in a car without wearing a seat belt? No   Have you felt unusual stress, anger or loneliness in the last month? Yes   Who do you live with? Alone   If you need help, do you have trouble finding someone available to you? No   Have you been bothered in the last four weeks by sexual problems? No   Do you have difficulty concentrating, remembering or making decisions? No         Does the patient have evidence of cognitive impairment? No    Asprin use counseling:Contraindicated from taking ASA    Age-appropriate Screening Schedule:  Refer to the list below for future screening recommendations based on patient's age,  sex and/or medical conditions. Orders for these recommended tests are listed in the plan section. The patient has been provided with a written plan.    Health Maintenance   Topic Date Due   • DXA SCAN  Never done   • LIPID PANEL  12/29/2021   • TDAP/TD VACCINES (2 - Td or Tdap) 07/12/2031   • ZOSTER VACCINE  Completed   • INFLUENZA VACCINE  Discontinued          The following portions of the patient's history were reviewed and updated as appropriate: allergies, current medications, past family history, past medical history, past social history, past surgical history and problem list.    Outpatient Medications Prior to Visit   Medication Sig Dispense Refill   • acetaminophen (Tylenol) 325 MG tablet Take 650 mg by mouth Every 4 (Four) Hours As Needed for Moderate Pain .     • albuterol sulfate  (90 Base) MCG/ACT inhaler Inhale 2 puffs Every 6 (Six) Hours As Needed for Wheezing.     • atorvastatin (LIPITOR) 40 MG tablet TAKE ONE TABLET BY MOUTH DAILY 90 tablet 3   • azithromycin (ZITHROMAX) 250 MG tablet Take 250 mg by mouth Daily.     • B Complex Vitamins (VITAMIN B COMPLEX PO) Take 1 tablet by mouth Daily.     • calcium carbonate (OS-EVIN) 600 MG tablet Take 600 mg by mouth Daily. With vitamin D     • cetirizine (zyrTEC) 10 MG tablet Take 10 mg by mouth Daily.     • cholecalciferol (VITAMIN D3) 25 MCG (1000 UT) tablet Take 1,000 Units by mouth Daily.     • citalopram (CeleXA) 10 MG tablet Take 1 tablet by mouth Daily. 30 tablet 3   • ezetimibe (ZETIA) 10 MG tablet TAKE ONE TABLET BY MOUTH DAILY 90 tablet 3   • FeroSul 325 (65 Fe) MG tablet TAKE ONE TABLET BY MOUTH DAILY WITH BREAKFAST 90 tablet 0   • fluticasone (FLONASE) 50 MCG/ACT nasal spray 2 sprays into the nostril(s) as directed by provider At Night As Needed for Allergies.     • fluticasone-salmeterol (ADVAIR HFA) 230-21 MCG/ACT inhaler Inhale 2 puffs 2 (Two) Times a Day.     • glucosamine-chondroitin 500-400 MG capsule capsule Take 1 capsule by mouth  Daily.     • guaiFENesin (MUCINEX) 600 MG 12 hr tablet Take 2 tablets by mouth Every 12 (Twelve) Hours. (Patient taking differently: Take 1,200 mg by mouth 2 (Two) Times a Day As Needed for Congestion.) 14 tablet 0   • ipratropium-albuterol (DUO-NEB) 0.5-2.5 mg/3 ml nebulizer Take 3 mL by nebulization Every 4 (Four) Hours As Needed.     • Lactobacillus (FLORAJEN ACIDOPHILUS) capsule Take 1 tablet by mouth Daily.     • losartan (COZAAR) 50 MG tablet Take 100 mg by mouth Daily.     • Multiple Vitamins-Minerals (PRESERVISION AREDS PO) Take 1 tablet by mouth Daily.     • nystatin (MYCOSTATIN) 449554 UNIT/ML suspension Swish and swallow 500,000 Units 4 (Four) Times a Day.     • O2 (OXYGEN) Inhale 2 L/min Every Night.     • potassium chloride (MICRO-K) 10 MEQ CR capsule TAKE ONE CAPSULE BY MOUTH DAILY 90 capsule 2   • tobramycin PF (ISABEL) 300 MG/5ML nebulizer solution Take 300 mg by nebulization 2 (Two) Times a Day.     • torsemide (DEMADEX) 20 MG tablet TAKE ONE TABLET BY MOUTH TWICE A  tablet 0   • verapamil ER (VERELAN) 360 MG 24 hr capsule TAKE ONE CAPSULE BY MOUTH EVERY NIGHT 90 capsule 3   • warfarin (COUMADIN) 3 MG tablet Stop for 3 days.  Then take one tablet (3 mg) by mouth on Mondays, and take 1.5 tablet (4.5 mg) by mouth all other days or as directed. 130 tablet 0     No facility-administered medications prior to visit.       Patient Active Problem List   Diagnosis   • Benign essential hypertension   • Chronic coronary artery disease   • Hyperlipidemia   • Mitral valve insufficiency   • Ventricular premature beats   • Ventricular tachycardia (CMS/HCC)   • Persistent atrial fibrillation (CMS/HCC)   • Acid-fast bacteria present   • DNR (do not resuscitate)   • Chronic diastolic CHF (congestive heart failure) (CMS/HCC)   • CHF (congestive heart failure) (CMS/HCC)   • Asymptomatic bacteriuria   • Iron deficiency anemia   • Bronchiectasis (CMS/HCC)   • KINA (mycobacterium avium-intracellulare) (CMS/HCC)   •  "Atrial fibrillation (CMS/HCC)   • Anxiety   • Exposure to hepatitis A   • Medicare annual wellness visit, subsequent   • Abdominal pain   • Herpes infection   • Moderate asthma with acute exacerbation   • Bronchitis, acute, with bronchospasm   • Abnormal EKG   • Acute bronchitis due to parainfluenza virus   • COPD with acute exacerbation    • Fall   • Laceration of left upper extremity   • Multiple skin tears   • Alteration in anticoagulation   • Blind right eye       Advanced Care Planning:  ACP discussion was held with the patient during this visit. Patient has an advance directive in EMR which is still valid.     Review of Systems    Compared to one year ago, the patient feels her physical health is the same.  Compared to one year ago, the patient feels her mental health is the same.    Reviewed chart for potential of high risk medication in the elderly: yes  Reviewed chart for potential of harmful drug interactions in the elderly:yes    Objective         Vitals:    07/29/21 1259   BP: 124/56   BP Location: Left arm   Patient Position: Sitting   Cuff Size: Adult   Pulse: 74   SpO2: 98%   Weight: 58.4 kg (128 lb 12.8 oz)   Height: 157.5 cm (62\")   PainSc: 0-No pain       Body mass index is 23.56 kg/m².  Discussed the patient's BMI with her. The BMI is in the acceptable range.    Physical Exam          Assessment/Plan   Medicare Risks and Personalized Health Plan  CMS Preventative Services Quick Reference  Cardiovascular risk  Fall Risk  decreased vision    The above risks/problems have been discussed with the patient.  Pertinent information has been shared with the patient in the After Visit Summary.  Follow up plans and orders are seen below in the Assessment/Plan Section.    Diagnoses and all orders for this visit:    1. Benign essential hypertension (Primary)    2. Chronic diastolic congestive heart failure (CMS/HCC)    3. Atrial fibrillation, unspecified type (CMS/HCC)    4. Fall, subsequent encounter    5. " Blindness of right eye, unspecified left eye visual impairment category    Recommend walker for fall prevention secondary to blindness and CHF  Follow Up:  Return in about 6 months (around 1/29/2022), or if symptoms worsen or fail to improve, for Recheck.     An After Visit Summary and PPPS were given to the patient.       Ongoing management of chronic medical problems

## 2021-07-30 ENCOUNTER — ANTICOAGULATION VISIT (OUTPATIENT)
Dept: PHARMACY | Facility: HOSPITAL | Age: 86
End: 2021-07-30

## 2021-07-30 ENCOUNTER — HOME CARE VISIT (OUTPATIENT)
Dept: HOME HEALTH SERVICES | Facility: HOME HEALTHCARE | Age: 86
End: 2021-07-30

## 2021-07-30 VITALS
SYSTOLIC BLOOD PRESSURE: 134 MMHG | DIASTOLIC BLOOD PRESSURE: 70 MMHG | RESPIRATION RATE: 20 BRPM | OXYGEN SATURATION: 96 % | BODY MASS INDEX: 22.86 KG/M2 | TEMPERATURE: 97.7 F | WEIGHT: 125 LBS | HEART RATE: 76 BPM

## 2021-07-30 VITALS
SYSTOLIC BLOOD PRESSURE: 110 MMHG | OXYGEN SATURATION: 93 % | TEMPERATURE: 97.3 F | DIASTOLIC BLOOD PRESSURE: 56 MMHG | HEART RATE: 63 BPM

## 2021-07-30 DIAGNOSIS — I48.19 PERSISTENT ATRIAL FIBRILLATION (HCC): Primary | ICD-10-CM

## 2021-07-30 LAB — INR PPP: 2.9

## 2021-07-30 PROCEDURE — G0299 HHS/HOSPICE OF RN EA 15 MIN: HCPCS

## 2021-07-30 PROCEDURE — G0151 HHCP-SERV OF PT,EA 15 MIN: HCPCS

## 2021-07-30 NOTE — PROGRESS NOTES
Anticoagulation Clinic Progress Note    Anticoagulation Summary  As of 2021    INR goal:  2.0-3.0   TTR:  64.1 % (2.9 y)   INR used for dosin.90 (2021)   Warfarin maintenance plan:  4.5 mg every Sun, Tue, Thu; 3 mg all other days   Weekly warfarin total:  25.5 mg   Plan last modified:  Lev Mckeon PharmD (2021)   Next INR check:  2021   Priority:  High   Target end date:      Indications    Persistent atrial fibrillation (CMS/HCC) [I48.19]             Anticoagulation Episode Summary     INR check location:      Preferred lab:      Send INR reminders to:  [facility] CLINICAL POOL    Comments:  **[facility] - ok to only call when out of range**      Anticoagulation Care Providers     Provider Role Specialty Phone number    Marcie Robbins MD Referring Cardiology [phone]          INR History:  Anticoagulation Monitoring 2021   INR 2.70 3.10 2.90   INR Date 2021   INR Goal 2.0-3.0 2.0-3.0 2.0-3.0   Trend Down Same Down   Last Week Total 22.5 mg 25.5 mg 25.5 mg   Next Week Total 27 mg 25.5 mg 25.5 mg   Sun 4.5 mg - 4.5 mg   Mon 3 mg - 3 mg   Tue - 3 mg () 4.5 mg   Wed 3 mg () 3 mg -   Thu 4.5 mg 4.5 mg -   Fri 3 mg - 3 mg   Sat 4.5 mg - 3 mg   Visit Report - - -   Some recent data might be hidden       Plan:  1. INR is Therapeutic today- see above in Anticoagulation Summary.   Provided instructions to Megan with [facility] to Change their warfarin regimen- see above in Anticoagulation Summary.  2. Follow up in 5 days      Lev Mckeon PharmD   No

## 2021-08-04 ENCOUNTER — ANTICOAGULATION VISIT (OUTPATIENT)
Dept: PHARMACY | Facility: HOSPITAL | Age: 86
End: 2021-08-04

## 2021-08-04 ENCOUNTER — HOME CARE VISIT (OUTPATIENT)
Dept: HOME HEALTH SERVICES | Facility: HOME HEALTHCARE | Age: 86
End: 2021-08-04

## 2021-08-04 VITALS
WEIGHT: 125 LBS | OXYGEN SATURATION: 96 % | HEART RATE: 76 BPM | RESPIRATION RATE: 20 BRPM | TEMPERATURE: 98.1 F | DIASTOLIC BLOOD PRESSURE: 66 MMHG | BODY MASS INDEX: 22.86 KG/M2 | SYSTOLIC BLOOD PRESSURE: 132 MMHG

## 2021-08-04 DIAGNOSIS — I48.19 PERSISTENT ATRIAL FIBRILLATION (HCC): Primary | ICD-10-CM

## 2021-08-04 LAB — INR PPP: 2.5

## 2021-08-04 PROCEDURE — G0299 HHS/HOSPICE OF RN EA 15 MIN: HCPCS

## 2021-08-04 NOTE — PROGRESS NOTES
Anticoagulation Clinic Progress Note    Anticoagulation Summary  As of 2021    INR goal:  2.0-3.0   TTR:  64.2 % (2.9 y)   INR used for dosin.50 (2021)   Warfarin maintenance plan:  4.5 mg every Sun, Tue, Thu; 3 mg all other days   Weekly warfarin total:  25.5 mg   Plan last modified:  Lev Mckeon, PharmD (2021)   Next INR check:     Priority:  High   Target end date:      Indications    Persistent atrial fibrillation (CMS/HCC) [I48.19]             Anticoagulation Episode Summary     INR check location:      Preferred lab:      Send INR reminders to:   [facility]    Comments:  **Acel[facility] - ok to only call when out of range**      Anticoagulation Care Providers     Provider Role Specialty Phone number    Marcie Robbins MD Referring Cardiology [phone]          Clinic Interview:  No pertinent clinical findings have been reported.    INR History:  Anticoagulation Monitoring 2021   INR 3.10 2.90 2.50   INR Date 2021   INR Goal 2.0-3.0 2.0-3.0 2.0-3.0   Trend Same Down Same   Last Week Total 25.5 mg 25.5 mg 25.5 mg   Next Week Total 25.5 mg 25.5 mg 25.5 mg   Sun - 4.5 mg 4.5 mg   Mon - 3 mg 3 mg   Tue 3 mg () 4.5 mg 4.5 mg   Wed 3 mg - 3 mg   Thu 4.5 mg - 4.5 mg   Fri - 3 mg 3 mg   Sat - 3 mg 3 mg   Visit Report - - -   Some recent data might be hidden       Plan:  1. INR is therapeutic today- see above in Anticoagulation Summary.    Agnieszka Rizzo to continue their warfarin regimen- see above in Anticoagulation Summary.  2. Follow up in 1 week  3. Pt has agreed to only be called if INR out of range. They have been instructed to call if any changes in medications, doses, concerns, etc.     Agnieszka Posey, [facility]

## 2021-08-06 RX ORDER — WARFARIN SODIUM 3 MG/1
3 TABLET ORAL NIGHTLY
Qty: 130 TABLET | Refills: 0 | Status: SHIPPED | OUTPATIENT
Start: 2021-08-06 | End: 2021-08-24

## 2021-08-16 ENCOUNTER — ANTICOAGULATION VISIT (OUTPATIENT)
Dept: PHARMACY | Facility: HOSPITAL | Age: 86
End: 2021-08-16

## 2021-08-16 DIAGNOSIS — I48.19 PERSISTENT ATRIAL FIBRILLATION (HCC): Primary | ICD-10-CM

## 2021-08-16 LAB — INR PPP: 2.5

## 2021-08-16 NOTE — PROGRESS NOTES
Anticoagulation Clinic Progress Note    Anticoagulation Summary  As of 2021    INR goal:  2.0-3.0   TTR:  64.5 % (2.9 y)   INR used for dosin.50 (2021)   Warfarin maintenance plan:  4.5 mg every Sun, Tue, Thu; 3 mg all other days   Weekly warfarin total:  25.5 mg   No change documented:  Danielle Fierro   Plan last modified:  Lev Mckeon, PharmD (2021)   Next INR check:  2021   Priority:  High   Target end date:      Indications    Persistent atrial fibrillation (CMS/HCC) [I48.19]             Anticoagulation Episode Summary     INR check location:      Preferred lab:      Send INR reminders to:   [facility]    Comments:  **Acel[facility] - ok to only call when out of range**      Anticoagulation Care Providers     Provider Role Specialty Phone number    Marcie Robbins MD Referring Cardiology [phone]          Clinic Interview:  No pertinent clinical findings have been reported.    INR History:  Anticoagulation Monitoring 2021   INR 2.90 2.50 2.50   INR Date 2021   INR Goal 2.0-3.0 2.0-3.0 2.0-3.0   Trend Down Same Same   Last Week Total 25.5 mg 25.5 mg 25.5 mg   Next Week Total 25.5 mg 25.5 mg 25.5 mg   Sun 4.5 mg 4.5 mg 4.5 mg   Mon 3 mg 3 mg 3 mg   Tue 4.5 mg 4.5 mg 4.5 mg   Wed - 3 mg 3 mg   Thu - 4.5 mg 4.5 mg   Fri 3 mg 3 mg 3 mg   Sat 3 mg 3 mg 3 mg   Visit Report - - -   Some recent data might be hidden       Plan:  1. INR is therapeutic today- see above in Anticoagulation Summary.    Agnieszka Rizzo to continue their warfarin regimen- see above in Anticoagulation Summary.  2. Follow up in 2 weeks  3. Pt has agreed to only be called if INR out of range. They have been instructed to call if any changes in medications, doses, concerns, etc. Patient expresses understanding and has no further questions at this time.    Danielle Fierro

## 2021-08-24 ENCOUNTER — OFFICE VISIT (OUTPATIENT)
Dept: CARDIOLOGY | Facility: CLINIC | Age: 86
End: 2021-08-24

## 2021-08-24 VITALS
HEIGHT: 62 IN | BODY MASS INDEX: 23.74 KG/M2 | HEART RATE: 75 BPM | WEIGHT: 129 LBS | SYSTOLIC BLOOD PRESSURE: 120 MMHG | DIASTOLIC BLOOD PRESSURE: 70 MMHG

## 2021-08-24 DIAGNOSIS — E78.5 DYSLIPIDEMIA: ICD-10-CM

## 2021-08-24 DIAGNOSIS — E78.2 MIXED HYPERLIPIDEMIA: ICD-10-CM

## 2021-08-24 DIAGNOSIS — I48.0 PAROXYSMAL ATRIAL FIBRILLATION (HCC): ICD-10-CM

## 2021-08-24 DIAGNOSIS — I50.32 CHRONIC DIASTOLIC CHF (CONGESTIVE HEART FAILURE) (HCC): ICD-10-CM

## 2021-08-24 DIAGNOSIS — I48.19 PERSISTENT ATRIAL FIBRILLATION (HCC): ICD-10-CM

## 2021-08-24 DIAGNOSIS — I10 BENIGN ESSENTIAL HYPERTENSION: Primary | ICD-10-CM

## 2021-08-24 DIAGNOSIS — I25.10 CHRONIC CORONARY ARTERY DISEASE: ICD-10-CM

## 2021-08-24 PROCEDURE — 99214 OFFICE O/P EST MOD 30 MIN: CPT | Performed by: INTERNAL MEDICINE

## 2021-08-24 PROCEDURE — 93000 ELECTROCARDIOGRAM COMPLETE: CPT | Performed by: INTERNAL MEDICINE

## 2021-08-24 RX ORDER — EZETIMIBE 10 MG/1
10 TABLET ORAL DAILY
Qty: 90 TABLET | Refills: 3 | Status: SHIPPED | OUTPATIENT
Start: 2021-08-24 | End: 2022-11-23 | Stop reason: SDUPTHER

## 2021-08-24 RX ORDER — ATORVASTATIN CALCIUM 40 MG/1
40 TABLET, FILM COATED ORAL DAILY
Qty: 90 TABLET | Refills: 3 | Status: SHIPPED | OUTPATIENT
Start: 2021-08-24 | End: 2022-11-11 | Stop reason: SDUPTHER

## 2021-08-24 RX ORDER — TORSEMIDE 20 MG/1
20 TABLET ORAL 2 TIMES DAILY
Qty: 180 TABLET | Refills: 3 | Status: SHIPPED | OUTPATIENT
Start: 2021-08-24 | End: 2022-02-24 | Stop reason: SDUPTHER

## 2021-08-24 RX ORDER — VERAPAMIL HYDROCHLORIDE 360 MG/1
360 CAPSULE, DELAYED RELEASE PELLETS ORAL NIGHTLY
Qty: 90 CAPSULE | Refills: 3 | Status: SHIPPED | OUTPATIENT
Start: 2021-08-24 | End: 2022-02-24 | Stop reason: SDUPTHER

## 2021-08-24 RX ORDER — POTASSIUM CHLORIDE 750 MG/1
10 CAPSULE, EXTENDED RELEASE ORAL DAILY
Qty: 90 CAPSULE | Refills: 3 | Status: SHIPPED | OUTPATIENT
Start: 2021-08-24 | End: 2022-09-27 | Stop reason: SDUPTHER

## 2021-08-24 RX ORDER — SODIUM CHLORIDE FOR INHALATION 0.9 %
3 VIAL, NEBULIZER (ML) INHALATION 2 TIMES DAILY
Status: ON HOLD | COMMUNITY

## 2021-08-24 NOTE — PROGRESS NOTES
CARDIOLOGY    Marcie Robbins MD    ENCOUNTER DATE:  08/24/2021    Agnieszka Rizzo / 89 y.o. / female        CHIEF COMPLAINT / REASON FOR OFFICE VISIT     Coronary Artery Disease      HISTORY OF PRESENT ILLNESS       HPI    Agnieszka Rizzo is a 89 y.o. female     This is a lady I met while she was hospitalized in November 2016. She has a significant pulmonary history and was having a bronchoscopy and unfortunately developed a pneumothorax. She was found to be in rate -controlled atrial fibrillation. She had previously been seen by Dr. Ana Goodrich for history of hypertension, hyperlipidemia, mild mitral valve prolapse and nonobstructive coronary artery disease diagnosed by heart catheterization in 2007.      Echocardiogram November 15, 2016:  All left ventricular wall segments contract normally.  Left ventricular function is normal. Estimated EF = 58%.  Left atrial cavity size is moderately dilated.  Mild tricuspid valve regurgitation is present.  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 They felt she would do well on oral anticoagulation and I started her on Pradaxa this was subsequently changed to warfarin. I did not do a stress test while she was hospitalized because she was wheezing and I did not fill comfortable giving her Lexiscan at that time and I did not when he is dobutamine because of her atrial fibrillation.       She was able to have a stress as an outpt on 12/19/16 and this was normal. She continued to complain of dyspnea on exertion and so I had her set up for a cardioversion. She had a couple of hospitalizations in the meantime for respiratory issues. While she was in the hospital in January 2017, I attempted to cardiovert her. I shocked her 3 times that she did not remain in sinus rhythm. I spoke with her family and we decided to continue with rate control and  anticoagulation.      She was hospitalized in 02/2017. She had started Voriconazole for treatment of pulmonary aspergillosis by Dr. Tucker. She became very nauseous and sick, and threw up for an entire day. She came into the hospital confused and weak. Her sodium was at 109. She was found to have a left clavicle fracture from a fall. She was discharged to [facility], where she has been. Unfortunately, as a result of the treatment for the aspergillosis, she developed C. difficile and was rehospitalized for treatment of this in March 2017. As a part of his treatment she did receive IV fluids. She was discharged but then I readmitted her on March 22 for IV diuresis. She was volume overloaded and in diastolic heart failure. She was back in the hospital on April 2 with more C. difficile colitis. Again she was treated with IV fluids and became volume overloaded. I diuresed her in the hospital and she was only mildly volume overloaded at discharge.     She was hospitalized from May 31 of June 9 2017.  While there she had some rapid ventricular response due to her atrial fibrillation and being sick with community-acquired pneumonia.  Some changes were made to her medication but in the end she was discharged on verapamil 360 mg once a day and digoxin 0.125 mg a day.     She was hospitalized in December 2019 with a COPD exacerbation/viral bronchitis.         Unfortunately she had 2 falls recently.  1 she got tripped up on the bathroom rug.  The other when she was outside and fell on some rocks.  Neither were associated with loss of consciousness.  Unfortunately she says her skin is paperthin and she ripped off skin on her right arm all the way up.  They say that she bruises very easily.  Just helping her walk will leave bruises on her arms.    REVIEW OF SYSTEMS     Review of Systems   Constitutional: Negative for fever, malaise/fatigue, weight gain and weight loss.   HENT: Negative for ear pain, hearing loss, nosebleeds and  "sore throat.    Eyes: Negative for double vision, pain, vision loss in left eye and vision loss in right eye.   Cardiovascular:        See history of present illness.   Respiratory: Negative for cough, shortness of breath, sleep disturbances due to breathing, snoring and wheezing.    Endocrine: Negative for cold intolerance, heat intolerance and polyuria.   Hematologic/Lymphatic: Positive for bleeding problem. Bruises/bleeds easily.   Skin: Negative for itching, poor wound healing and rash.   Musculoskeletal: Negative for joint pain, joint swelling and myalgias.   Gastrointestinal: Negative for abdominal pain, diarrhea, hematochezia, nausea and vomiting.   Genitourinary: Negative for hematuria and hesitancy.   Neurological: Negative for numbness, paresthesias and seizures.   Psychiatric/Behavioral: Negative for depression. The patient is not nervous/anxious.          VITAL SIGNS     Visit Vitals  /70   Pulse 75   Ht 157.5 cm (62\")   Wt 58.5 kg (129 lb)   BMI 23.59 kg/m²         Wt Readings from Last 3 Encounters:   08/24/21 58.5 kg (129 lb)   08/04/21 56.7 kg (125 lb)   07/30/21 56.7 kg (125 lb)     Body mass index is 23.59 kg/m².      PHYSICAL EXAMINATION     Constitutional:       General: Not in acute distress.  Neck:      Vascular: No carotid bruit or JVD.   Pulmonary:      Effort: Pulmonary effort is normal.      Breath sounds: Normal breath sounds.   Cardiovascular:      Normal rate. Regular rhythm.      Murmurs: There is no murmur.   Psychiatric:         Mood and Affect: Mood and affect normal.           REVIEWED DATA       ECG 12 Lead    Date/Time: 8/24/2021 2:58 PM  Performed by: Marcie Robbins MD  Authorized by: Marcie Robbins MD   Comparison: compared with previous ECG from 3/5/2021  Similar to previous ECG  Rhythm: sinus rhythm  BPM: 75  Conduction: conduction normal  ST Segments: ST segments normal  T Waves: T waves normal    Clinical impression: normal ECG              Lipid Panel    Lipid " Panel 12/29/20   Total Cholesterol 206 (A)   Triglycerides 127   HDL Cholesterol 70 (A)   VLDL Cholesterol 22   LDL Cholesterol  114 (A)   LDL/HDL Ratio 1.58   (A) Abnormal value              Lab Results   Component Value Date    GLUCOSE 124 (H) 03/04/2021    BUN 15 03/04/2021    CREATININE 0.88 03/04/2021    EGFRIFNONA 60 (L) 03/04/2021    EGFRIFAFRI 87 11/14/2019    BCR 17.0 03/04/2021    K 3.8 03/04/2021    CO2 27.2 03/04/2021    CALCIUM 9.6 03/04/2021    ALBUMIN 4.40 03/04/2021    AST 36 (H) 03/04/2021    ALT 34 (H) 03/04/2021       ASSESSMENT & PLAN      Diagnosis Plan   1. Benign essential hypertension  Lipid Panel    Comprehensive Metabolic Panel    CBC (No Diff)    TSH    torsemide (DEMADEX) 20 MG tablet   2. Dyslipidemia  Lipid Panel    Comprehensive Metabolic Panel    CBC (No Diff)    TSH   3. Chronic coronary artery disease  Lipid Panel    Comprehensive Metabolic Panel    CBC (No Diff)    TSH   4. Persistent atrial fibrillation (CMS/HCC)  Lipid Panel    Comprehensive Metabolic Panel    CBC (No Diff)    TSH   5. Chronic diastolic CHF (congestive heart failure) (CMS/[facility])  Lipid Panel    Comprehensive Metabolic Panel    CBC (No Diff)    TSH   6. Mixed hyperlipidemia  Lipid Panel    Comprehensive Metabolic Panel    CBC (No Diff)    TSH   7. Paroxysmal atrial fibrillation (CMS/HCC)         1. Atrial fibrillation. She failed cardioversion in January 2017. She has a CHADS2-Vasc score of 5.  She has been having problems with bleeding and bruising and her INR has been bouncing around.  I am going to put her on Eliquis 2.5 mg twice daily instead.     2. Chronic diastolic heart failure. She is on torsemide.  She is euvolemic.     3. Dyspnea on exertion. Multifactorial.  Much improved.  She is exercising regularly.     4. Mitral valve prolapse with very mild mitral regurgitation.     5. Mild tricuspid regurgitation without pulmonary hypertension.     6. Nonobstructive coronary artery disease diagnosed by catheter in  2007. She has noted coronary artery calcification on CT scans. Nuclear stress 12/16 was normal.  he has had some chest discomfort so I am point to repeat the stress test.     7. Hypertension. Her blood pressure is controlled.      8. Hyperlipidemia. Zetia and atorvastatin.  Labs ordered.     9. Hyponatremia. Stable.     10. C. difficile diarrhea. 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  She has completed her antibiotic regimen.  She has a follow-up appointment with Dr. Tucker early next year     I encouraged her to slow down and take her time so she does not have any more falls.  I will see her back in 6 months    Orders Placed This Encounter   Procedures   • Lipid Panel     Standing Status:   Future     Standing Expiration Date:   8/24/2022     Order Specific Question:   Release to patient     Answer:   Immediate   • Comprehensive Metabolic Panel     Standing Status:   Future     Standing Expiration Date:   8/24/2022     Order Specific Question:   Release to patient     Answer:   Immediate   • CBC (No Diff)     Standing Status:   Future     Standing Expiration Date:   8/24/2022     Order Specific Question:   Release to patient     Answer:   Immediate   • TSH     Standing Status:   Future     Standing Expiration Date:   8/24/2022     Order Specific Question:   Release to patient     Answer:   Immediate   • ECG 12 Lead     This order was created via procedure documentation     Order Specific Question:   Release to patient     Answer:   Immediate           MEDICATIONS         Discharge Medications          Accurate as of August 24, 2021  3:05 PM. If you have any questions, ask your nurse or doctor.            New Medications      Instructions Start Date   apixaban 2.5 MG tablet tablet  Commonly known as: ELIQUIS  Started by: Marcie Robbins MD   2.5 mg, Oral, Every 12 Hours Scheduled         Continue These Medications      Instructions Start Date    albuterol sulfate  (90 Base) MCG/ACT inhaler  Commonly known as: PROVENTIL HFA;VENTOLIN HFA;PROAIR HFA   2 puffs, Inhalation, Every 6 Hours PRN      atorvastatin 40 MG tablet  Commonly known as: LIPITOR   40 mg, Oral, Daily      azithromycin 250 MG tablet  Commonly known as: ZITHROMAX   250 mg, Oral, Daily      calcium carbonate 600 MG tablet  Commonly known as: OS-EVIN   600 mg, Oral, Daily, With vitamin D      cetirizine 10 MG tablet  Commonly known as: zyrTEC   10 mg, Oral, Daily      cholecalciferol 25 MCG (1000 UT) tablet  Commonly known as: VITAMIN D3   1,000 Units, Oral, Daily      ezetimibe 10 MG tablet  Commonly known as: ZETIA   10 mg, Oral, Daily      FeroSul 325 (65 FE) MG tablet  Generic drug: ferrous sulfate   TAKE ONE TABLET BY MOUTH DAILY WITH BREAKFAST      Florajen Acidophilus capsule   1 tablet, Oral, Daily      fluticasone 50 MCG/ACT nasal spray  Commonly known as: FLONASE   2 sprays, Nasal, Nightly PRN      fluticasone-salmeterol 230-21 MCG/ACT inhaler  Commonly known as: ADVAIR HFA   2 puffs, Inhalation, 2 Times Daily - RT      glucosamine-chondroitin 500-400 MG capsule capsule   1 capsule, Oral, Daily      ipratropium-albuterol 0.5-2.5 mg/3 ml nebulizer  Commonly known as: DUO-NEB   3 mL, Nebulization, Every 4 Hours PRN      losartan 50 MG tablet  Commonly known as: COZAAR   100 mg, Oral, Daily      multivitamin with minerals tablet tablet   1 tablet, Oral, Daily      nystatin [id number] UNIT/ML suspension  Commonly known as: MYCOSTATIN   500,000 Units, Swish & Swallow, 4 Times Daily      O2  Commonly known as: OXYGEN   2 L/min, Inhalation, Nightly      potassium chloride 10 MEQ CR capsule  Commonly known as: MICRO-K   10 mEq, Oral, Daily      sodium chloride 0.9 % nebulizer solution   3 mL, Nebulization, 2 Times Daily      tobramycin  MG/5ML nebulizer solution  Commonly known as: ISABEL   300 mg, Nebulization, 2 Times Daily - RT      torsemide 20 MG tablet  Commonly known as:  DEMADEX   20 mg, Oral, 2 Times Daily      Tylenol 325 MG tablet  Generic drug: acetaminophen   650 mg, Oral, Every 4 Hours PRN      verapamil  MG 24 hr capsule  Commonly known as: VERELAN   360 mg, Oral, Nightly      VITAMIN B COMPLEX PO   1 tablet, Oral, Daily         Stop These Medications    citalopram 10 MG tablet  Commonly known as: CeleXA  Stopped by: Marcie Robbins MD     guaiFENesin 600 MG 12 hr tablet  Commonly known as: MUCINEX  Stopped by: Marcie Robbins MD     warfarin 3 MG tablet  Commonly known as: COUMADIN  Stopped by: MD Marcie Small MD  08/24/21  15:05 EDT    **Dragon Disclaimer:   Much of this encounter note is an electronic transcription/translation of spoken language to printed text. The electronic translation of spoken language may permit erroneous, or at times, nonsensical words or phrases to be inadvertently transcribed. Although I have reviewed the note for such errors, some may still exist.

## 2021-08-27 ENCOUNTER — LAB (OUTPATIENT)
Dept: LAB | Facility: HOSPITAL | Age: 86
End: 2021-08-27

## 2021-08-27 ENCOUNTER — TELEPHONE (OUTPATIENT)
Dept: CARDIOLOGY | Facility: CLINIC | Age: 86
End: 2021-08-27

## 2021-08-27 PROCEDURE — 80053 COMPREHEN METABOLIC PANEL: CPT | Performed by: INTERNAL MEDICINE

## 2021-08-27 PROCEDURE — 80061 LIPID PANEL: CPT | Performed by: INTERNAL MEDICINE

## 2021-08-27 PROCEDURE — 84443 ASSAY THYROID STIM HORMONE: CPT | Performed by: INTERNAL MEDICINE

## 2021-08-27 PROCEDURE — 85027 COMPLETE CBC AUTOMATED: CPT | Performed by: INTERNAL MEDICINE

## 2021-08-27 NOTE — TELEPHONE ENCOUNTER
Patient called back.  I relayed Dr. Robbins's instructions to her.  Patient verbalized understanding. / YUNIEL

## 2021-08-27 NOTE — TELEPHONE ENCOUNTER
----- Message from Marcie Robbins MD sent at 8/27/2021  3:28 PM EDT -----  Please let her know that I reviewed her labs and everything looks good.  I want to continue her current medications. JL

## 2021-09-01 ENCOUNTER — TELEPHONE (OUTPATIENT)
Dept: INTERNAL MEDICINE | Facility: CLINIC | Age: 86
End: 2021-09-01

## 2021-09-01 NOTE — TELEPHONE ENCOUNTER
Caller: Agnieszka Rizzo    Relationship to patient: Self    Best call back number: [phone]    Patient is needing: PATIENT CALLED STATING THAT SHE RECEIVED HER SECOND COVID VACCINE IN February. PATIENT STATES THAT SHE HAS HEARD THAT YOU ARE TO WAIT 8 MO BEFORE GETTING THE BOOSTER. PATIENT IS REQUESTING A CALL BACK TO DISCUSS IF SHE NEEDS TO GET BOOSTER    PLEASE CALL BACK AND ADVISE

## 2021-09-15 RX ORDER — FERROUS SULFATE 325(65) MG
TABLET ORAL
Qty: 90 TABLET | Refills: 0 | Status: SHIPPED | OUTPATIENT
Start: 2021-09-15 | End: 2021-12-13

## 2021-11-04 ENCOUNTER — ANTICOAGULATION VISIT (OUTPATIENT)
Dept: PHARMACY | Facility: HOSPITAL | Age: 86
End: 2021-11-04

## 2021-11-04 NOTE — PROGRESS NOTES
This encounter is for documentation purposes only. The patient has discontinued warfarin and was started on eliquis as of 8/24/21. The medication management clinic will no longer be managing this patient. It has been a pleasure to be a part of her care.

## 2021-11-15 NOTE — PATIENT INSTRUCTIONS
Jones Marin  [address] [facility] [id number]    Dr. Tyrone Richardson  [address] [place] [address]  ** Report to GI Services Building, Entrance F 1st Floor, [facility]  ** Please call [phone] with any questions        Please follow these instructions. Disregard instructions that are provided on the medication package.    Date of Procedure: Friday February 4 2022   Check in at: 12:00 PM   Procedure at: 1:00 PM    Your laxative prescription (Suprep) may be picked up from:  [facility]    Colonoscopy Preparation Instructions  ?   **Please make arrangements for a responsible adult to drive you home. (A responsible adult is someone age 18 or older who can receive and understand instructions, stay with you, and call for assistance as instructed).**    Regarding Your Medications Prior to Your Procedure:  • Do not take any iron or iron-containing multivitamins beginning five days prior to your procedure.    • Blood-thinners typically need to be stopped a few days prior to the procedure. Check with your prescribing physician if you take blood thinners such as Coumadin (warfarin), Pradaxa, Plavix, Eliquis, Xarelto or Brilinta.  • Do not take aspirin or anti-inflammatory medications (Advil, Motrin, ibuprofen, Aleve, naproxen), in the morning on the day of your procedure.  • If you are diabetic:  o Take half of your usual dose of insulin the night before. DO NOT take insulin the morning of the procedure.  o Hold oral diabetic medications the night before and the morning of the procedure.               The Day Before the Procedure:  • Start a clear liquid diet at breakfast. Continue a clear liquid diet for the entire day in unlimited amounts. Clear liquids are listed below.   • At 5:00 PM (or when you are home for the evening) pour one 6 ounce bottle of Suprep liquid into the mixing container. Add cool water to the 16 ounce fill line on the container and stir. Drink the entire mixture.  HOLD today, then resume 3 mg MF, 4.5 mg rest of wk.      • Drink at least two 16 ounce container of water over the next 2 hours. This is the minimum you should drink.   • You may continue other clear liquids.  • Walking will help the laxative move through the colon.     The Day of the Procedure:  • At 7:00 AM pour the other 6 ounce bottle of Suprep liquids into the mixing container. Add cool water to the 16 ounce fill line on the container and stir. Drink the entire mixture.   • Drink at least two 16 ounce containers of water over the next 2 hours. This is the minimum you should drink.   • You may take your morning medications with a sip of water, unless otherwise directed.   • Do not have anything by mouth after 11:00 AM.     Clear Liquid Diet:  Do not consume anything red or purple.    Beverages: soft drinks/soda, Gatorade or Haile-Aid, clear fruit juices without pulp, water, tea, coffee (no milk or non dairy creamer).   Broths: chicken, beef or vegetable.   Desserts: hard candies, Jell-O, Popsicles. (No fruit bars or sherbet)    If stools are not clear yellow the morning of the procedure, please call the GI lab at [phone].                             About Your Colonoscopy  What is colonoscopy?    Colonoscopy is a procedure that allows your doctor to clearly see the lining of your colon (large bowel). A flexible tube, about the thickness of your finger, is put into the rectum and moved slowly through the entire colon. A special camera in the tube allows the doctor to see any problem areas in the colon.    Why is a colonoscopy needed?    A colonoscopy can be done:     1.   As a screening test for colon cancer. The American Cancer Society recommends colon screening for every adult starting at age 50, sometimes earlier, if you have a family history of colon cancer or polyps.   Ask your doctor when you should be screened.     2.  To find out what is causing symptoms such as rectal bleeding or changes in bowel habits (X-rays alone may not find the problem).    3.  As  a regular follow-up exam for patients with previous polyps, colon cancer, or a family history of colon cancer.     How do I prepare for the colonoscopy?    For the best possible exam, the colon must be completely empty. Your doctor will give you detailed instructions for a cleansing routine and diet to follow. Or, you will be told what time to arrive at the hospital to begin the cleansing routine before your colonoscopy.    Can I take my medicines?    Most medicines can be taken as usual, but some can interfere with the preparation or the exam. Please talk with your doctor at least a week before the exam. Ask about taking your medicines, especially if you take aspirin products, anticoagulants (blood thinners), arthritis or blood pressure medicines, insulin or iron products.          What happens during the colonoscopy?    Your doctor will give you medicine through a vein in your arm to help you relax and stay comfortable during the exam. You will lie on your side or on your back while the flexible tube is moved slowly through the large bowel. As the tube is slowly withdrawn, the doctor looks at the lining of the large bowel. You may feel some cramping or gas but the medicine should keep you comfortable. The exam usually takes about 20 to 30 minutes.    What is a polypectomy?    Sometimes polyps are found during a colonoscopy.     Polyps are abnormal growths of tissue found on the colon lining. If your doctor thinks a polyp should be removed, a small wire loop or snare will be passed through the tube and the polyp will be cut out.  You will not feel this. Most polyps are benign (not cancer). But some may contain an area of cancer or may develop into cancer.     Removal of colon polyps is an important way to prevent colon cancer. People who have had a history of polyps may need to have follow-up colonoscopies to check if new polyps have formed. These follow-up exams usually occur in one to five years of your first  exam (your doctor will tell you when you need to schedule another exam). Some people who have large polyps or cancerous polyps may need to have surgery. Your doctor will educate and prepare you for this step, if needed.    What happens after the colonoscopy?    • Your doctor will explain the results to you.   • You may have some cramping or bloating because of the air put into the colon during the exam. This should go away quickly with passage of gas.   • Generally, you should be able to eat and drink as usual after the exam.  •   If you were given medicines to help you relax during the exam, someone must take you home.  For your own safety, please have a friend or family member drive you. If you do not have a ride home, your procedure may need to be rescheduled.    Going home    •  You should not drive or operate any machinery for 24 hours. Even if you feel alert, your judgment and reflexes may be slower from the sedative medicine.  •  Do not make legally binding decisions for the next 24 hours.  •  Do not drink alcohol for the next 24 hours.        Are there complications of colonoscopy?    Complications after a colonoscopy are rare, but can occur.         What are the risks of colonoscopy?    While this is a relatively safe and routine procedure, there are risks associated with it.  The average risk of potential complications is reported to be less than 1%.  Please note this percent is NOT zero.  Complications can and do occur.    These can include, but are not limited to:    · Perforation:  A tear or hole in the colon.  Average risk is generally less than 0.1%.  Emergency surgery may be required to repair this.  · Bleeding after the removal of a polyp can occur, generally less than 0.5% risk.  This will often stop on its own, but may require blood transfusion, repeat colonoscopy, or surgery.  · Risk of infection or injury to internal organs is extremely low.  · Complications from the sedation, which can include  (and are not limited to): breathing or blood pressure problems, pneumonia infection, heart problems or heart attack, stroke, etc.  · While there are risks as mentioned above, having a colonoscopy to identify and remove polyps can prevent up to 90% of colorectal cancers.    When should I call the doctor?    Although complications after colonoscopy are not common, it is important to know the early signs of any possible complication. Call the doctor who performed your colonoscopy if you notice:    • Severe abdominal pain  • Fever and chills  • Bloody bowel movement; bleeding can occur several days after polyp removal  • Distended and hard abdomen  • If you have any questions or concerns    Need more information?    Please talk with your doctor or the office staff if you have questions about the colonoscopy.   For any questions regarding cost, billing and insurance coverage, please call [phone]. If you have questions that have not been answered, please discuss them with the nurse or doctor before the exam begins.        INSURANCE COVERAGE REGARDING PAYMENT  FOR YOUR COLONOSCOPY    **To obtain a pre-service estimate of your procedure - call [phone] to reach the **    Colon Cancer is the second leading cause of death among cancers, per the American Cancer Society.  It is preventable.  Early detection is the key.  Your doctor will determine which tests need to be done for prevention and/or treatment.    If during the course of a screening colonoscopy, our physician finds an abnormality, performs a biopsy or polypectomy (removal of polyp), your insurance company most likely will consider the procedure to be a diagnostic exam and no longer a screening procedure.    Every insurance company is different.  We encourage you to call your insurance company and ask them \"if during the course of a screening colonoscopy, an abnormality is discovered and the physician performs a biopsy or polypectomy,  will the procedure fall under your screening benefits or under diagnostic benefits\".  Generally, screening benefits and diagnostic benefits are paid at different levels.  This varies with each insurance company, so we want you to be aware of this prior to your procedure.  You do not have to call your insurance company if you have Medicare.    The authorization staff at [facility] will precertify your colonoscopy.  However, precertification, which serves as a notification is never a guarantee of payment.  If you have questions regarding precertification for your procedure please contact your insurance company.                           Dr. Tyrone Richardson    11/15/2021      Jones Marin  [address] [place] [phone]                    Dear Mr. Marin    Please review the enclosed information.    Thank you.                                        Jones Marin  [address] [place] [phone]    11/15/21      Date of COVID 19 Test:  Tuesday February 1 2022    Time:  1:30 PM  (Please arrive 5 minutes prior to your scheduled test time)    Testing will be at the facility lab locations.  Location:  [facility] ([address] [place] [address]) Testing site on 2nd floor, [address]    Please follow the below instructions until after surgery is completed:     · Do NOT travel out of home area (city/town)  · Self-quarantine is recommended and minimizes your risk of exposure before your procedure.  If you are unable to self-quarantine please continue to follow the below guidelines.  ·  Immediately report any new symptoms or suspected/known exposures to COVID-19 cases to [phone].  · Maintain physical distancing (at least 6 feet) at all times both at home and away from home  · Wear a mask while you are outside the home.  · Wash hands frequently and thoroughly with soap and water (lather at least 20 seconds) or disinfect with alcohol-based hand  before eating, before touching  face/mouth/eyes, after touching shared objects or high touch surfaces.   · Regularly clean cell phones, door handles, light switches, faucet and toilet handles, bathrooms, and kitchen surfaces using household disinfectant or hot soapy water.

## 2021-12-13 RX ORDER — FERROUS SULFATE 325(65) MG
TABLET ORAL
Qty: 90 TABLET | Refills: 0 | Status: SHIPPED | OUTPATIENT
Start: 2021-12-13 | End: 2022-03-23

## 2021-12-30 ENCOUNTER — TELEPHONE (OUTPATIENT)
Dept: INTERNAL MEDICINE | Facility: CLINIC | Age: 86
End: 2021-12-30

## 2021-12-30 NOTE — TELEPHONE ENCOUNTER
Spoke to patient's daughter and patient had a positive rapid test only.  She will take patient to Urgent Care to be seen and tested per Dr. Rose.

## 2021-12-30 NOTE — TELEPHONE ENCOUNTER
Caller: Michelle Zaldivar    Relationship: Emergency Contact    Best call back number:[phone]    What is the best time to reach you: ANYTIME    Who are you requesting to speak with (clinical staff, provider,  specific staff member): CLINICAL STAFF    What was the call regarding: DAUGHTER STATES MOM TESTED POSITIVE FOR COVID TODAY 12/30/21 MOM IS 90 YEARS OLD AND DAUGHTER WOULD LIKE TO KNOW WHERE THEY GO FROM THERE AND IF SHE IS ABLE TO RECEIVE THE COVID INFUSION    Do you require a callback: YES

## 2021-12-31 DIAGNOSIS — U07.1 COVID-19 VIRUS INFECTION: Primary | ICD-10-CM

## 2022-01-04 ENCOUNTER — HOSPITAL ENCOUNTER (OUTPATIENT)
Dept: INFUSION THERAPY | Facility: HOSPITAL | Age: 87
Discharge: HOME | End: 2022-01-04
Admitting: INTERNAL MEDICINE

## 2022-01-04 ENCOUNTER — TRANSCRIBE ORDERS (OUTPATIENT)
Dept: ADMINISTRATIVE | Facility: HOSPITAL | Age: 87
End: 2022-01-04

## 2022-01-04 VITALS
HEART RATE: 66 BPM | SYSTOLIC BLOOD PRESSURE: 155 MMHG | OXYGEN SATURATION: 96 % | TEMPERATURE: 97.3 F | DIASTOLIC BLOOD PRESSURE: 89 MMHG

## 2022-01-04 DIAGNOSIS — U07.1 CLINICAL DIAGNOSIS OF COVID-19: Primary | ICD-10-CM

## 2022-01-04 DIAGNOSIS — U07.1 CLINICAL DIAGNOSIS OF SEVERE ACUTE RESPIRATORY SYNDROME CORONAVIRUS 2 (SARS-COV-2) DISEASE: Primary | ICD-10-CM

## 2022-01-04 PROCEDURE — 96365 THER/PROPH/DIAG IV INF INIT: CPT

## 2022-01-04 PROCEDURE — 25010000002 SOTROVIMAB 500 MG/8ML SOLUTION: Performed by: INTERNAL MEDICINE

## 2022-01-04 PROCEDURE — M0247 HC INTRAVENOUS INFUSION SOTROVIMAB: HCPCS | Performed by: INTERNAL MEDICINE

## 2022-01-04 RX ORDER — METHYLPREDNISOLONE SODIUM SUCCINATE 125 MG/2ML
125 INJECTION, POWDER, LYOPHILIZED, FOR SOLUTION INTRAMUSCULAR; INTRAVENOUS AS NEEDED
OUTPATIENT
Start: 2022-01-04

## 2022-01-04 RX ORDER — DIPHENHYDRAMINE HYDROCHLORIDE 50 MG/ML
50 INJECTION INTRAMUSCULAR; INTRAVENOUS ONCE AS NEEDED
OUTPATIENT
Start: 2022-01-04

## 2022-01-04 RX ORDER — SODIUM CHLORIDE 9 MG/ML
30 INJECTION, SOLUTION INTRAVENOUS ONCE
Status: CANCELLED | OUTPATIENT
Start: 2022-01-04

## 2022-01-04 RX ORDER — DIPHENHYDRAMINE HYDROCHLORIDE 50 MG/ML
50 INJECTION INTRAMUSCULAR; INTRAVENOUS ONCE AS NEEDED
Status: CANCELLED | OUTPATIENT
Start: 2022-01-04

## 2022-01-04 RX ORDER — PREDNISONE 10 MG/1
10 TABLET ORAL DAILY
COMMUNITY
Start: 2021-12-31 | End: 2022-07-19 | Stop reason: HOSPADM

## 2022-01-04 RX ORDER — EPINEPHRINE 1 MG/ML
0.3 INJECTION, SOLUTION, CONCENTRATE INTRAVENOUS AS NEEDED
Status: CANCELLED | OUTPATIENT
Start: 2022-01-04

## 2022-01-04 RX ORDER — DIPHENHYDRAMINE HCL 50 MG
50 CAPSULE ORAL ONCE AS NEEDED
Status: CANCELLED | OUTPATIENT
Start: 2022-01-04

## 2022-01-04 RX ORDER — METHYLPREDNISOLONE SODIUM SUCCINATE 125 MG/2ML
125 INJECTION, POWDER, LYOPHILIZED, FOR SOLUTION INTRAMUSCULAR; INTRAVENOUS AS NEEDED
Status: DISCONTINUED | OUTPATIENT
Start: 2022-01-04 | End: 2022-01-06 | Stop reason: HOSPADM

## 2022-01-04 RX ORDER — METHYLPREDNISOLONE SODIUM SUCCINATE 125 MG/2ML
125 INJECTION, POWDER, LYOPHILIZED, FOR SOLUTION INTRAMUSCULAR; INTRAVENOUS AS NEEDED
Status: CANCELLED | OUTPATIENT
Start: 2022-01-04

## 2022-01-04 RX ORDER — DIPHENHYDRAMINE HCL 25 MG
50 CAPSULE ORAL ONCE AS NEEDED
Status: DISCONTINUED | OUTPATIENT
Start: 2022-01-04 | End: 2022-01-06 | Stop reason: HOSPADM

## 2022-01-04 RX ORDER — SODIUM CHLORIDE 9 MG/ML
30 INJECTION, SOLUTION INTRAVENOUS ONCE
Status: COMPLETED | OUTPATIENT
Start: 2022-01-04 | End: 2022-01-04

## 2022-01-04 RX ORDER — DIPHENHYDRAMINE HYDROCHLORIDE 50 MG/ML
50 INJECTION INTRAMUSCULAR; INTRAVENOUS ONCE AS NEEDED
Status: DISCONTINUED | OUTPATIENT
Start: 2022-01-04 | End: 2022-01-06 | Stop reason: HOSPADM

## 2022-01-04 RX ORDER — DIPHENHYDRAMINE HCL 25 MG
50 CAPSULE ORAL ONCE AS NEEDED
OUTPATIENT
Start: 2022-01-04

## 2022-01-04 RX ADMIN — SODIUM CHLORIDE 30 ML/HR: 9 INJECTION, SOLUTION INTRAVENOUS at 18:12

## 2022-01-04 RX ADMIN — SODIUM CHLORIDE 500 MG: 9 INJECTION, SOLUTION INTRAVENOUS at 18:12

## 2022-01-04 NOTE — PROGRESS NOTES
Patient provided with Fact Sheet for Patients, Parents and Caregivers Emergency Use Authorization (EUA) of Sotrovimab for Coronavirus Disease 2019 (COVID-19) form.    Reviewed and patient verbalized understanding.  Appropriate PPE worn during the care of the patient.  Advised patient not to receive Covid vaccine for 90 days.    Pt tolerated infusion well and monitored for 1 hour following infusion without complication.

## 2022-02-23 ENCOUNTER — TELEPHONE (OUTPATIENT)
Dept: INTERNAL MEDICINE | Facility: CLINIC | Age: 87
End: 2022-02-23

## 2022-02-23 ENCOUNTER — APPOINTMENT (OUTPATIENT)
Dept: ULTRASOUND IMAGING | Facility: HOSPITAL | Age: 87
End: 2022-02-23

## 2022-02-23 ENCOUNTER — HOSPITAL ENCOUNTER (EMERGENCY)
Facility: HOSPITAL | Age: 87
Discharge: HOME | End: 2022-02-23
Attending: EMERGENCY MEDICINE | Admitting: EMERGENCY MEDICINE

## 2022-02-23 VITALS
BODY MASS INDEX: 22.75 KG/M2 | HEIGHT: 63 IN | OXYGEN SATURATION: 96 % | TEMPERATURE: 98.1 F | SYSTOLIC BLOOD PRESSURE: 157 MMHG | RESPIRATION RATE: 20 BRPM | HEART RATE: 70 BPM | WEIGHT: 128.4 LBS | DIASTOLIC BLOOD PRESSURE: 80 MMHG

## 2022-02-23 DIAGNOSIS — M79.89 RIGHT LEG SWELLING: Primary | ICD-10-CM

## 2022-02-23 PROCEDURE — 99282 EMERGENCY DEPT VISIT SF MDM: CPT | Performed by: PHYSICIAN ASSISTANT

## 2022-02-23 PROCEDURE — 99282 EMERGENCY DEPT VISIT SF MDM: CPT

## 2022-02-23 PROCEDURE — 93971 EXTREMITY STUDY: CPT

## 2022-02-23 NOTE — TELEPHONE ENCOUNTER
Caller: Agnieszka Rizzo    Relationship to patient: Self    Best call back number: [phone]    Patient is needing: PATIENT STATES THAT THE SCAB ON HER LEG HAS BECOME SWOLLEN AND PAINFUL. PATIENT HAS USED OTC ANTIBIOTIC OINTMENT BUT IS NOT SURE WHAT SHE NEEDS TO DO. REQUESTING CALL BACK ASAP     PLEASE CALL BACK AND ADVISE     PATIENT WOULD LIKE TO REMIND DR COTTER THAT SHE IS ALLERGIC TO SOME ANTIBIOTICS

## 2022-02-24 ENCOUNTER — OFFICE VISIT (OUTPATIENT)
Dept: CARDIOLOGY | Facility: CLINIC | Age: 87
End: 2022-02-24

## 2022-02-24 VITALS
HEIGHT: 63 IN | WEIGHT: 129 LBS | HEART RATE: 76 BPM | OXYGEN SATURATION: 98 % | BODY MASS INDEX: 22.86 KG/M2 | DIASTOLIC BLOOD PRESSURE: 78 MMHG | SYSTOLIC BLOOD PRESSURE: 146 MMHG | RESPIRATION RATE: 16 BRPM

## 2022-02-24 DIAGNOSIS — I50.32 CHRONIC DIASTOLIC CHF (CONGESTIVE HEART FAILURE): Primary | ICD-10-CM

## 2022-02-24 DIAGNOSIS — I49.3 VENTRICULAR PREMATURE BEATS: ICD-10-CM

## 2022-02-24 DIAGNOSIS — I10 BENIGN ESSENTIAL HYPERTENSION: ICD-10-CM

## 2022-02-24 DIAGNOSIS — I48.0 PAROXYSMAL ATRIAL FIBRILLATION: ICD-10-CM

## 2022-02-24 PROBLEM — I50.9 CHF (CONGESTIVE HEART FAILURE): Status: RESOLVED | Noted: 2017-03-22 | Resolved: 2022-02-24

## 2022-02-24 PROCEDURE — 99214 OFFICE O/P EST MOD 30 MIN: CPT | Performed by: INTERNAL MEDICINE

## 2022-02-24 PROCEDURE — 93000 ELECTROCARDIOGRAM COMPLETE: CPT | Performed by: INTERNAL MEDICINE

## 2022-02-24 RX ORDER — CEPHALEXIN 500 MG/1
500 CAPSULE ORAL 3 TIMES DAILY
Qty: 21 CAPSULE | Refills: 0 | Status: ON HOLD | OUTPATIENT
Start: 2022-02-24 | End: 2022-07-09

## 2022-02-24 RX ORDER — TORSEMIDE 20 MG/1
20 TABLET ORAL 2 TIMES DAILY
Qty: 180 TABLET | Refills: 3 | Status: SHIPPED | OUTPATIENT
Start: 2022-02-24 | End: 2022-09-27 | Stop reason: SDUPTHER

## 2022-02-24 RX ORDER — VERAPAMIL HYDROCHLORIDE 360 MG/1
360 CAPSULE, DELAYED RELEASE PELLETS ORAL NIGHTLY
Qty: 90 CAPSULE | Refills: 3 | Status: SHIPPED | OUTPATIENT
Start: 2022-02-24 | End: 2022-07-19 | Stop reason: HOSPADM

## 2022-02-24 NOTE — PROGRESS NOTES
CARDIOLOGY    Marcie Robbins MD    ENCOUNTER DATE:  02/24/2022    Agnieszka Rizzo / 89 y.o. / female        CHIEF COMPLAINT / REASON FOR OFFICE VISIT     Coronary Artery Disease, Atrial Fibrillation, and Hypertension      HISTORY OF PRESENT ILLNESS       HPI    Agnieszka Rizzo is a 89 y.o. female     This is a lady I met while she was hospitalized in November 2016. She has a significant pulmonary history and was having a bronchoscopy and unfortunately developed a pneumothorax. She was found to be in rate -controlled atrial fibrillation. She had previously been seen by Dr. Ana Goodrich for history of hypertension, hyperlipidemia, mild mitral valve prolapse and nonobstructive coronary artery disease diagnosed by heart catheterization in 2007.      Echocardiogram November 15, 2016:  All left ventricular wall segments contract normally.  Left ventricular function is normal. Estimated EF = 58%.  Left atrial cavity size is moderately dilated.  Mild tricuspid valve regurgitation is present.  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 They felt she would do well on oral anticoagulation and I started her on Pradaxa this was subsequently changed to warfarin. I did not do a stress test while she was hospitalized because she was wheezing and I did not fill comfortable giving her Lexiscan at that time and I did not when he is dobutamine because of her atrial fibrillation.       She was able to have a stress as an outpt on 12/19/16 and this was normal. She continued to complain of dyspnea on exertion and so I had her set up for a cardioversion. She had a couple of hospitalizations in the meantime for respiratory issues. While she was in the hospital in January 2017, I attempted to cardiovert her. I shocked her 3 times that she did not remain in sinus rhythm. I spoke with her family and we  decided to continue with rate control and anticoagulation.      She was hospitalized in 02/2017. She had started Voriconazole for treatment of pulmonary aspergillosis by Dr. Tucker. She became very nauseous and sick, and threw up for an entire day. She came into the hospital confused and weak. Her sodium was at 109. She was found to have a left clavicle fracture from a fall. She was discharged to [facility], where she has been. Unfortunately, as a result of the treatment for the aspergillosis, she developed C. difficile and was rehospitalized for treatment of this in March 2017. As a part of his treatment she did receive IV fluids. She was discharged but then I readmitted her on March 22 for IV diuresis. She was volume overloaded and in diastolic heart failure. She was back in the hospital on April 2 with more C. difficile colitis. Again she was treated with IV fluids and became volume overloaded. I diuresed her in the hospital and she was only mildly volume overloaded at discharge.     She was hospitalized from May 31 of June 9 2017.  While there she had some rapid ventricular response due to her atrial fibrillation and being sick with community-acquired pneumonia.  Some changes were made to her medication but in the end she was discharged on verapamil 360 mg once a day and digoxin 0.125 mg a day.     She was hospitalized in December 2019 with a COPD exacerbation/viral bronchitis.       Overall she is doing well.  Unfortunately she did get Covid at the end of December with some family members but came out of it without any significant issues.  Not requiring oxygen.  No palpitations, shortness of breath or chest pain.      REVIEW OF SYSTEMS     Review of Systems   Constitutional: Negative for fever, malaise/fatigue, weight gain and weight loss.   HENT: Negative for ear pain, hearing loss, nosebleeds and sore throat.    Eyes: Negative for double vision, pain, vision loss in left eye and vision loss in right eye.  "  Cardiovascular:        See history of present illness.   Respiratory: Negative for cough, shortness of breath, sleep disturbances due to breathing, snoring and wheezing.    Endocrine: Negative for cold intolerance, heat intolerance and polyuria.   Skin: Negative for itching, poor wound healing and rash.   Musculoskeletal: Negative for joint pain, joint swelling and myalgias.   Gastrointestinal: Negative for abdominal pain, diarrhea, hematochezia, nausea and vomiting.   Genitourinary: Negative for hematuria and hesitancy.   Neurological: Negative for numbness, paresthesias and seizures.   Psychiatric/Behavioral: Negative for depression. The patient is not nervous/anxious.          VITAL SIGNS     Visit Vitals  /78 (BP Location: Right arm, Patient Position: Sitting, Cuff Size: Adult)   Pulse 76   Resp 16   Ht 160 cm (63\")   Wt 58.5 kg (129 lb)   SpO2 98%   BMI 22.85 kg/m²         Wt Readings from Last 3 Encounters:   02/24/22 58.5 kg (129 lb)   02/23/22 58.2 kg (128 lb 6.4 oz)   08/24/21 58.5 kg (129 lb)     Body mass index is 22.85 kg/m².      PHYSICAL EXAMINATION     Vitals reviewed.   Constitutional:       General: Not in acute distress.     Appearance: Normal appearance. Well-developed.   Eyes:      General: Lids are normal. Lids are everted, no foreign bodies appreciated.      Conjunctiva/sclera: Conjunctivae normal.      Pupils: Pupils are equal, round, and reactive to light.   HENT:      Head: Normocephalic and atraumatic.   Neck:      Thyroid: No thyroid mass.      Vascular: No carotid bruit or JVD.      Trachea: No tracheal deviation.   Pulmonary:      Effort: Pulmonary effort is normal.      Breath sounds: Normal breath sounds.   Cardiovascular:      Normal rate. Regular rhythm.      Murmurs: There is no murmur.   Pulses:     Dorsalis pedis: 2+ bilaterally.  Abdominal:      General: Bowel sounds are normal.   Musculoskeletal: Normal range of motion.      Cervical back: Normal range of motion. Skin:   "   General: Skin is warm and dry.   Neurological:      Mental Status: Alert.   Psychiatric:         Mood and Affect: Mood and affect normal.         Behavior: Behavior normal.           REVIEWED DATA       ECG 12 Lead    Date/Time: 2/24/2022 1:06 PM  Performed by: Marcie Robbins MD  Authorized by: Marcie Robbins MD   Comparison: compared with previous ECG from 8/24/2021  Similar to previous ECG  Rhythm: sinus rhythm  BPM: 76  Conduction: conduction normal  ST Segments: ST segments normal  T Waves: T waves normal    Clinical impression: normal ECG              Lipid Panel    Lipid Panel 8/27/21   Total Cholesterol 167   Triglycerides 88   HDL Cholesterol 66 (A)   VLDL Cholesterol 16   LDL Cholesterol  85   LDL/HDL Ratio 1.26   (A) Abnormal value              Lab Results   Component Value Date    GLUCOSE 92 08/27/2021    BUN 18 08/27/2021    CREATININE 0.80 08/27/2021    EGFRIFNONA 67 08/27/2021    EGFRIFAFRI 87 11/14/2019    BCR 22.5 08/27/2021    K 3.9 08/27/2021    CO2 28.5 08/27/2021    CALCIUM 9.5 08/27/2021    ALBUMIN 4.10 08/27/2021    AST 21 08/27/2021    ALT 14 08/27/2021       ASSESSMENT & PLAN      Diagnosis Plan   1. Chronic diastolic CHF (congestive heart failure) (HCC)     2. Benign essential hypertension  torsemide (DEMADEX) 20 MG tablet   3. Paroxysmal atrial fibrillation (HCC)     4. Ventricular premature beats       1. Atrial fibrillation. She failed cardioversion in January 2017. She has a CHADS2-Vasc score of 5.  She has been having problems with bleeding and bruising and her INR has been bouncing around.    This is better since being on Eliquis 2.5 mg twice a day.  Continue current dose..     2. Chronic diastolic heart failure. She is on torsemide.  She is euvolemic.     3. Dyspnea on exertion. Multifactorial.  Much improved.  She is exercising regularly.     4. Mitral valve prolapse with very mild mitral regurgitation.     5. Mild tricuspid regurgitation without pulmonary  hypertension.     6. Nonobstructive coronary artery disease diagnosed by catheter in 2007. She has noted coronary artery calcification on CT scans. Nuclear stress 12/16 was normal.  he has had some chest discomfort so I am point to repeat the stress test.     7. Hypertension. Her blood pressure is controlled.  She monitors her blood pressure at home.  She gave me some of her numbers which seems to be very well controlled.     8. Hyperlipidemia. atorvastatin.      9. Hyponatremia. Stable.     10. C. difficile diarrhea. 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  She has completed her antibiotic regimen.  She has a follow-up appointment with Dr. Tucker early next year    She has been doing great.  I will see her back in a year unless she has any symptoms in her.     Orders Placed This Encounter   Procedures   • ECG 12 Lead     This order was created via procedure documentation     Order Specific Question:   Release to patient     Answer:   Immediate           MEDICATIONS         Discharge Medications          Accurate as of February 24, 2022  1:07 PM. If you have any questions, ask your nurse or doctor.            New Medications      Instructions Start Date   cephalexin 500 MG capsule  Commonly known as: Keflex  Started by: Marcie Robbins MD   500 mg, Oral, 3 Times Daily         Continue These Medications      Instructions Start Date   albuterol sulfate  (90 Base) MCG/ACT inhaler  Commonly known as: PROVENTIL HFA;VENTOLIN HFA;PROAIR HFA   2 puffs, Inhalation, Every 6 Hours PRN      apixaban 2.5 MG tablet tablet  Commonly known as: ELIQUIS   2.5 mg, Oral, Every 12 Hours Scheduled      atorvastatin 40 MG tablet  Commonly known as: LIPITOR   40 mg, Oral, Daily      azithromycin 250 MG tablet  Commonly known as: ZITHROMAX   250 mg, Oral, Daily      calcium carbonate 600 MG tablet  Commonly known as: OS-EVIN   600 mg, Oral, Daily, With vitamin D       cetirizine 10 MG tablet  Commonly known as: zyrTEC   10 mg, Oral, Daily      cholecalciferol 25 MCG (1000 UT) tablet  Commonly known as: VITAMIN D3   1,000 Units, Oral, Daily      ezetimibe 10 MG tablet  Commonly known as: ZETIA   10 mg, Oral, Daily      FeroSul 325 (65 FE) MG tablet  Generic drug: ferrous sulfate   TAKE ONE TABLET BY MOUTH DAILY WITH BREAKFAST      Florajen Acidophilus capsule   1 tablet, Oral, Daily      fluticasone 50 MCG/ACT nasal spray  Commonly known as: FLONASE   2 sprays, Nasal, Nightly PRN      fluticasone-salmeterol 230-21 MCG/ACT inhaler  Commonly known as: ADVAIR HFA   2 puffs, Inhalation, 2 Times Daily - RT      glucosamine-chondroitin 500-400 MG capsule capsule   1 capsule, Oral, Daily      ipratropium-albuterol 0.5-2.5 mg/3 ml nebulizer  Commonly known as: DUO-NEB   3 mL, Nebulization, Every 4 Hours PRN      losartan 50 MG tablet  Commonly known as: COZAAR   100 mg, Oral, Daily      multivitamin with minerals tablet tablet   1 tablet, Oral, Daily      nystatin 100,000 unit/mL suspension  Commonly known as: MYCOSTATIN   500,000 Units, Swish & Swallow, 4 Times Daily      O2  Commonly known as: OXYGEN   2 L/min, Inhalation, Nightly      potassium chloride 10 MEQ CR capsule  Commonly known as: MICRO-K   10 mEq, Oral, Daily      predniSONE 10 MG tablet  Commonly known as: DELTASONE   10 mg, Oral, Daily      sodium chloride 0.9 % nebulizer solution   3 mL, Nebulization, 2 Times Daily      tobramycin  MG/5ML nebulizer solution  Commonly known as: ISABEL   300 mg, Nebulization, 2 Times Daily - RT      torsemide 20 MG tablet  Commonly known as: DEMADEX   20 mg, Oral, 2 Times Daily      Tylenol 325 MG tablet  Generic drug: acetaminophen   650 mg, Oral, Every 4 Hours PRN      verapamil  MG 24 hr capsule  Commonly known as: VERELAN   360 mg, Oral, Nightly      VITAMIN B COMPLEX PO   1 tablet, Oral, Daily               Marcie Robbins MD  02/24/22  13:07 EST    **Juanpablo Disclaimer:    Much of this encounter note is an electronic transcription/translation of spoken language to printed text. The electronic translation of spoken language may permit erroneous, or at times, nonsensical words or phrases to be inadvertently transcribed. Although I have reviewed the note for such errors, some may still exist.

## 2022-03-23 RX ORDER — FERROUS SULFATE 325(65) MG
TABLET ORAL
Qty: 90 TABLET | Refills: 0 | Status: SHIPPED | OUTPATIENT
Start: 2022-03-23 | End: 2022-06-22

## 2022-04-03 ENCOUNTER — APPOINTMENT (OUTPATIENT)
Dept: GENERAL RADIOLOGY | Facility: HOSPITAL | Age: 87
End: 2022-04-03

## 2022-04-03 ENCOUNTER — HOSPITAL ENCOUNTER (EMERGENCY)
Facility: HOSPITAL | Age: 87
Discharge: HOME | End: 2022-04-03
Attending: EMERGENCY MEDICINE | Admitting: EMERGENCY MEDICINE

## 2022-04-03 VITALS
SYSTOLIC BLOOD PRESSURE: 149 MMHG | WEIGHT: 122 LBS | HEIGHT: 62 IN | HEART RATE: 67 BPM | TEMPERATURE: 97.3 F | BODY MASS INDEX: 22.45 KG/M2 | DIASTOLIC BLOOD PRESSURE: 73 MMHG | RESPIRATION RATE: 12 BRPM | OXYGEN SATURATION: 97 %

## 2022-04-03 DIAGNOSIS — S60.222A CONTUSION OF LEFT HAND, INITIAL ENCOUNTER: Primary | ICD-10-CM

## 2022-04-03 DIAGNOSIS — S63.502A SPRAIN OF LEFT WRIST, INITIAL ENCOUNTER: ICD-10-CM

## 2022-04-03 PROCEDURE — 73110 X-RAY EXAM OF WRIST: CPT

## 2022-04-03 PROCEDURE — 99282 EMERGENCY DEPT VISIT SF MDM: CPT | Performed by: EMERGENCY MEDICINE

## 2022-04-03 PROCEDURE — 99282 EMERGENCY DEPT VISIT SF MDM: CPT

## 2022-04-03 PROCEDURE — 73130 X-RAY EXAM OF HAND: CPT

## 2022-04-03 NOTE — DISCHARGE INSTRUCTIONS
Follow-up with Dr. Jane within 1 week.  Ice the left hand and wrist for 20 minutes every 2 hours while awake for 2 to 3 days.  Elevate on a pillow.  Take Tylenol as needed as directed for pain.  Return to the emergency department if there is increased pain, numbness, tingling, weakness, change in color or temperature, worse in any way at all.

## 2022-04-03 NOTE — ED PROVIDER NOTES
Subjective   History of Present Illness  History of Present Illness    Chief complaint: Wrist pain    Location: Right wrist and hand    Quality/Severity: Moderate, sharp    Timing/Onset/Duration: Started yesterday    Modifying Factors: Hurts to move    Associated Symptoms: No numbness, tingling, or weakness.  There is bruising.  Patient had no loss of consciousness.  No headache.  No neck or back pain.  No chest pain or shortness of breath.  No abdominal pain.  No loss of control of bladder or bowel function.      Narrative: This 89-year-old white female, on Eliquis, missed a step yesterday at a restaurant and fell injuring her right wrist.    PCP:Matt Rose MD      Review of Systems   Constitutional: Negative for activity change.   HENT: Negative for ear pain, nosebleeds and rhinorrhea.    Respiratory: Negative for shortness of breath.    Cardiovascular: Negative for chest pain.   Gastrointestinal: Negative for abdominal distention, nausea and vomiting.   Genitourinary: Negative for difficulty urinating.   Musculoskeletal: Negative for back pain and neck pain.        Bruising to the left hand and tenderness with tenderness to the wrist   Neurological: Negative for weakness, numbness and headaches.   Psychiatric/Behavioral: Negative for confusion.        Medication List      ASK your doctor about these medications    albuterol sulfate  (90 Base) MCG/ACT inhaler  Commonly known as: PROVENTIL HFA;VENTOLIN HFA;PROAIR HFA     apixaban 2.5 MG tablet tablet  Commonly known as: ELIQUIS  Take 1 tablet by mouth Every 12 (Twelve) Hours.     atorvastatin 40 MG tablet  Commonly known as: LIPITOR  Take 1 tablet by mouth Daily.     azithromycin 250 MG tablet  Commonly known as: ZITHROMAX     calcium carbonate 600 MG tablet  Commonly known as: OS-EVIN     cephalexin 500 MG capsule  Commonly known as: Keflex  Take 1 capsule by mouth 3 (Three) Times a Day.     cetirizine 10 MG tablet  Commonly known as: zyrTEC      cholecalciferol 25 MCG (1000 UT) tablet  Commonly known as: VITAMIN D3     ezetimibe 10 MG tablet  Commonly known as: ZETIA  Take 1 tablet by mouth Daily.     FeroSul 325 (65 FE) MG tablet  Generic drug: ferrous sulfate  TAKE ONE TABLET BY MOUTH DAILY WITH BREAKFAST     Florajen Acidophilus capsule     fluticasone 50 MCG/ACT nasal spray  Commonly known as: FLONASE     fluticasone-salmeterol 230-21 MCG/ACT inhaler  Commonly known as: ADVAIR HFA     glucosamine-chondroitin 500-400 MG capsule capsule     ipratropium-albuterol 0.5-2.5 mg/3 ml nebulizer  Commonly known as: DUO-NEB     losartan 50 MG tablet  Commonly known as: COZAAR     multivitamin with minerals tablet tablet     nystatin 100,000 unit/mL suspension  Commonly known as: MYCOSTATIN     O2  Commonly known as: OXYGEN     potassium chloride 10 MEQ CR capsule  Commonly known as: MICRO-K  Take 1 capsule by mouth Daily.     predniSONE 10 MG tablet  Commonly known as: DELTASONE     sodium chloride 0.9 % nebulizer solution     tobramycin  MG/5ML nebulizer solution  Commonly known as: ISABEL     torsemide 20 MG tablet  Commonly known as: DEMADEX  Take 1 tablet by mouth 2 (Two) Times a Day.     Tylenol 325 MG tablet  Generic drug: acetaminophen     verapamil  MG 24 hr capsule  Commonly known as: VERELAN  Take 1 capsule by mouth Every Night.     VITAMIN B COMPLEX PO            Past Medical History:   Diagnosis Date   • Aspergillus (HCC)    • Asthma    • Atrial fibrillation (HCC)     chronic   • Atrial flutter (HCC)    • Bronchiectasis (HCC)    • C. difficile diarrhea 3/11/2017   • CAD (coronary artery disease)     nonobstructive   • Chronic diastolic CHF (congestive heart failure) (HCC)    • Colitis    • COPD (chronic obstructive pulmonary disease) (HCC)    • Cough    • Cryoglobulinemia (HCC)    • Dyspnea on exertion    • Fall    • Hyperlipidemia    • Hypertension    • Hyponatremia    • Hypoxia    • Infectious viral hepatitis     AGE 13   • Left shoulder  pain    • Leg swelling    • Lesion of lung    • Mild tricuspid regurgitation    • MR (mitral regurgitation)     mild   • MVP (mitral valve prolapse)    • Permanent atrial fibrillation (HCC)    • Pneumonia with the fungal infection aspergillosis (HCC) 1/6/2017   • Pneumothorax    • SOB (shortness of breath)    • UTI (urinary tract infection)    • Wheeze     mild       Allergies   Allergen Reactions   • Amlodipine Besylate Swelling   • Aspirin      Caused bleeding ulcers   • Bactrim [Sulfamethoxazole-Trimethoprim] Nausea And Vomiting   • Erythromycin Unknown (See Comments)     Patient does not recall type of reaction.   • Levaquin [Levofloxacin] Unknown (See Comments)     Patient does not recall what type of reaction.   • Macrobid [Nitrofurantoin] Nausea Only   • Ramipril Other (See Comments)     Cough       Past Surgical History:   Procedure Laterality Date   • BRONCHOSCOPY N/A 11/12/2016    Procedure: BRONCHOSCOPY WITH FLUORO, BRUSHINGS, BAL, AND BIOPSIES;  Surgeon: Rogelio Tucker MD;  Location: [facility] ENDOSCOPY;  Service:    • BRONCHOSCOPY Bilateral 6/3/2017    Procedure: BRONCHOSCOPY with BAL ;  Surgeon: Sung King MD;  Location: [facility] ENDOSCOPY;  Service:    • BRONCHOSCOPY N/A 12/17/2019    Procedure: BRONCHOSCOPY WITH WASHINGS;  Surgeon: Rogelio Tucker MD;  Location: [facility] ENDOSCOPY;  Service: Pulmonary   • CATARACT EXTRACTION EXTRACAPSULAR W/ INTRAOCULAR LENS IMPLANTATION     • COLONOSCOPY      2013   • D & C WITH SUCTION     • HYSTERECTOMY     • KNEE ARTHROSCOPY Left        Family History   Problem Relation Age of Onset   • Hypertension Mother    • Stroke Mother    • Hypertension Father    • Cancer Son    • Cancer Brother        Social History     Socioeconomic History   • Marital status:    Tobacco Use   • Smoking status: Never Smoker   • Smokeless tobacco: Never Used   • Tobacco comment: caffiene daily   Vaping Use   • Vaping Use: Never used   Substance and Sexual Activity   • Alcohol use: Yes      Alcohol/week: 2.0 standard drinks     Types: 1 Glasses of wine, 1 Shots of liquor per week     Comment: occasional   • Drug use: No   • Sexual activity: Yes     Partners: Male           Objective   Physical Exam  Vitals (The temperature is 97.3 °F, pulse 74, respirations 15, /71, room air pulse ox 97%.) and nursing note reviewed.   Constitutional:       Appearance: Normal appearance.   HENT:      Head: Normocephalic and atraumatic.      Nose: Nose normal. No congestion or rhinorrhea.      Mouth/Throat:      Mouth: Mucous membranes are moist.   Eyes:      Pupils: Pupils are equal, round, and reactive to light.   Neck:      Comments: There is no tenderness, deformity, or bony step-offs upon palpation the cervical, thoracic, lumbar sacrococcygeal spine.  Cardiovascular:      Rate and Rhythm: Normal rate and regular rhythm.      Pulses: Normal pulses.      Heart sounds: Normal heart sounds. No murmur heard.    No friction rub. No gallop.   Pulmonary:      Effort: Pulmonary effort is normal.      Breath sounds: Normal breath sounds.   Abdominal:      General: Abdomen is flat. Bowel sounds are normal. There is no distension.      Palpations: Abdomen is soft. There is no mass.      Tenderness: There is no abdominal tenderness. There is no guarding or rebound.      Hernia: No hernia is present.   Musculoskeletal:      Cervical back: Normal range of motion and neck supple. No rigidity or tenderness.      Comments: There is bruising in the right ulnar aspect of the hand.  The capillary refill is less than 2 seconds.  The sensation is intact.  There is a normal range of motion noted.  There is decreased range of motion secondary to pain.  There is a 2+ radial and ulnar pulse.  There is wrist tenderness.   Skin:     General: Skin is warm and dry.   Neurological:      General: No focal deficit present.      Mental Status: She is alert and oriented to person, place, and time.      Cranial Nerves: No cranial nerve deficit.       Sensory: No sensory deficit.      Motor: No weakness.      Coordination: Coordination normal.   Psychiatric:         Mood and Affect: Mood normal.         Procedures           ED Course      12:41 EDT, 04/03/22:  A Velcro wrist splint was a applied to the right wrist by the technician.  After application of splint, it was assessed by me and noted to be in good position with the right upper extremity being neurovascular intact.    12:41 EDT, 04/03/22:  Patient's diagnosis of fall with right wrist sprain and right hand contusion was discussed with her.  The patient should ice the right hand and wrist for 20 minutes every 2 hours while she is awake for 2 to 3 days.  She should elevated on a pillow.  She should take Tylenol as needed as directed for pain.  Patient should follow-up with Dr. Jane within 1 week.  She should return to the emergency department if there is increased pain, numbness, tingling, weakness, change in color or temperature, worse in any way at all.  All the patient's question were answered she will be discharged in good condition.                                           MDM    Final diagnoses:   Contusion of left hand, initial encounter   Sprain of left wrist, initial encounter       ED Disposition  ED Disposition     None          No follow-up provider specified.       Medication List      No changes were made to your prescriptions during this visit.          Du Borjas MD  04/03/22 [id number]       Du Borjas MD  04/08/22 [id number]

## 2022-04-04 ENCOUNTER — PATIENT OUTREACH (OUTPATIENT)
Dept: CASE MANAGEMENT | Facility: OTHER | Age: 87
End: 2022-04-04

## 2022-04-04 NOTE — OUTREACH NOTE
AMBULATORY CASE MANAGEMENT NOTE    Name and Relationship of Patient/Support Person: Agnieszka Rizzo L - Self    Adult Patient Profile  Questions/Answers    Flowsheet Row Most Recent Value   Symptoms/Conditions Managed at Home musculoskeletal   Barriers to Managing Health other (see comments)  [swelling to wrist]   Musculoskeletal Symptoms/Conditions other (see comments)  [sprain]   Musculoskeletal Management Strategies adequate rest, other (see comments), activity  [reviewed need for elevation]   Musculoskeletal Self-Management Outcome 4 (good)   Musculoskeletal Comment Patient plans to follow up in 2 weeks if pain not improved.       SDOH updated and reviewed with the patient during this program:  Financial Resource Strain: Low Risk    • Difficulty of Paying Living Expenses: Not hard at all      Food Insecurity: No Food Insecurity   • Worried About Running Out of Food in the Last Year: Never true   • Ran Out of Food in the Last Year: Never true      Transportation Needs: No Transportation Needs   • Lack of Transportation (Medical): No   • Lack of Transportation (Non-Medical): No   Patient Outreach    Introduced self, explained ACM RN role and provided contact information. Spoke with patient regarding her wrist sprain and ED visit. Patient states she has a swollen and and stiff fingers. ACM reminded patient that elevation and ice would help with the swelling and stiffness. Patient plans to follow up with ortho in a week or two if her sprain is not better. No SDOH needs. Daughter assists with transportation. Her neighbor assists with simple meals. No concern with financials at this time. Follow up review of needs scheduled next week.     Education Documentation  No documentation found.        AMILCAR HUBBARD  Ambulatory Case Management    4/4/2022, 16:34 EDT

## 2022-04-14 ENCOUNTER — PATIENT OUTREACH (OUTPATIENT)
Dept: CASE MANAGEMENT | Facility: OTHER | Age: 87
End: 2022-04-14

## 2022-04-14 NOTE — OUTREACH NOTE
AMBULATORY CASE MANAGEMENT NOTE    Name and Relationship of Patient/Support Person: Agnieszka Rizzo L - Self    Patient Outreach    Introduced self, explained ACM RN role and provided contact information. Follow up call completed with patient regarding sprain. Patient states she is much improved. Stopped wearing split to bed but continues to wear splint when active to prevent re-injury. Wrist still sore but swelling improved. Patient does not plan to follow up with ortho. ACM recommended scheduling follow up if wrist worsens instead of improves. Reviewed care gaps. Patient states she stopped DXA scans about ten years ago. She will review with Dr. Rose during her next visit. She exercises regularly and ensures her muscles are strong to prevent pain from degeneration in her spine. Advanced Directives on file per patient. AWV complete. No further outreach scheduled at this time.    Education Documentation  No documentation found.        AMILCAR HUBBARD  Ambulatory Case Management    4/14/2022, 11:58 EDT

## 2022-06-22 RX ORDER — FERROUS SULFATE 325(65) MG
TABLET ORAL
Qty: 90 TABLET | Refills: 0 | Status: SHIPPED | OUTPATIENT
Start: 2022-06-22 | End: 2022-09-09 | Stop reason: SDUPTHER

## 2022-07-09 ENCOUNTER — HOSPITAL ENCOUNTER (INPATIENT)
Facility: HOSPITAL | Age: 87
LOS: 8 days | Discharge: HOME HEALTH | End: 2022-07-19
Attending: EMERGENCY MEDICINE | Admitting: INTERNAL MEDICINE

## 2022-07-09 ENCOUNTER — APPOINTMENT (OUTPATIENT)
Dept: GENERAL RADIOLOGY | Facility: HOSPITAL | Age: 87
End: 2022-07-09

## 2022-07-09 DIAGNOSIS — D64.9 ANEMIA, UNSPECIFIED TYPE: ICD-10-CM

## 2022-07-09 DIAGNOSIS — J47.1 BRONCHIECTASIS WITH ACUTE EXACERBATION: ICD-10-CM

## 2022-07-09 DIAGNOSIS — J18.9 PNEUMONIA OF RIGHT LUNG DUE TO INFECTIOUS ORGANISM, UNSPECIFIED PART OF LUNG: Primary | ICD-10-CM

## 2022-07-09 DIAGNOSIS — D72.829 LEUKOCYTOSIS, UNSPECIFIED TYPE: ICD-10-CM

## 2022-07-09 DIAGNOSIS — J44.1 COPD WITH ACUTE EXACERBATION: ICD-10-CM

## 2022-07-09 LAB
ALBUMIN SERPL-MCNC: 3.5 G/DL (ref 3.5–5.2)
ALBUMIN/GLOB SERPL: 1.1 G/DL
ALP SERPL-CCNC: 113 U/L (ref 39–117)
ALT SERPL W P-5'-P-CCNC: 12 U/L (ref 1–33)
ANION GAP SERPL CALCULATED.3IONS-SCNC: 11.3 MMOL/L (ref 5–15)
AST SERPL-CCNC: 17 U/L (ref 1–32)
B PARAPERT DNA SPEC QL NAA+PROBE: NOT DETECTED
B PERT DNA SPEC QL NAA+PROBE: NOT DETECTED
BASOPHILS # BLD AUTO: 0.06 10*3/MM3 (ref 0–0.2)
BASOPHILS NFR BLD AUTO: 0.3 % (ref 0–1.5)
BILIRUB SERPL-MCNC: 0.6 MG/DL (ref 0–1.2)
BUN SERPL-MCNC: 15 MG/DL (ref 8–23)
BUN/CREAT SERPL: 18.1 (ref 7–25)
C PNEUM DNA NPH QL NAA+NON-PROBE: NOT DETECTED
CALCIUM SPEC-SCNC: 8.9 MG/DL (ref 8.2–9.6)
CHLORIDE SERPL-SCNC: 98 MMOL/L (ref 98–107)
CO2 SERPL-SCNC: 25.7 MMOL/L (ref 22–29)
CREAT SERPL-MCNC: 0.83 MG/DL (ref 0.57–1)
D-LACTATE SERPL-SCNC: 0.9 MMOL/L (ref 0.5–2)
DEPRECATED RDW RBC AUTO: 45.8 FL (ref 37–54)
EGFRCR SERPLBLD CKD-EPI 2021: 66.6 ML/MIN/1.73
EOSINOPHIL # BLD AUTO: 0.01 10*3/MM3 (ref 0–0.4)
EOSINOPHIL NFR BLD AUTO: 0.1 % (ref 0.3–6.2)
ERYTHROCYTE [DISTWIDTH] IN BLOOD BY AUTOMATED COUNT: 13.3 % (ref 12.3–15.4)
FLUAV SUBTYP SPEC NAA+PROBE: NOT DETECTED
FLUBV RNA ISLT QL NAA+PROBE: NOT DETECTED
GLOBULIN UR ELPH-MCNC: 3.1 GM/DL
GLUCOSE SERPL-MCNC: 147 MG/DL (ref 65–99)
HADV DNA SPEC NAA+PROBE: NOT DETECTED
HCOV 229E RNA SPEC QL NAA+PROBE: NOT DETECTED
HCOV HKU1 RNA SPEC QL NAA+PROBE: NOT DETECTED
HCOV NL63 RNA SPEC QL NAA+PROBE: NOT DETECTED
HCOV OC43 RNA SPEC QL NAA+PROBE: NOT DETECTED
HCT VFR BLD AUTO: 31.9 % (ref 34–46.6)
HGB BLD-MCNC: 10.8 G/DL (ref 12–15.9)
HMPV RNA NPH QL NAA+NON-PROBE: NOT DETECTED
HOLD SPECIMEN: NORMAL
HOLD SPECIMEN: NORMAL
HPIV1 RNA ISLT QL NAA+PROBE: NOT DETECTED
HPIV2 RNA SPEC QL NAA+PROBE: NOT DETECTED
HPIV3 RNA NPH QL NAA+PROBE: NOT DETECTED
HPIV4 P GENE NPH QL NAA+PROBE: NOT DETECTED
IMM GRANULOCYTES # BLD AUTO: 0.13 10*3/MM3 (ref 0–0.05)
IMM GRANULOCYTES NFR BLD AUTO: 0.7 % (ref 0–0.5)
INR PPP: 1.35 (ref 0.9–1.1)
LYMPHOCYTES # BLD AUTO: 1.3 10*3/MM3 (ref 0.7–3.1)
LYMPHOCYTES NFR BLD AUTO: 7.4 % (ref 19.6–45.3)
M PNEUMO IGG SER IA-ACNC: NOT DETECTED
MCH RBC QN AUTO: 32.1 PG (ref 26.6–33)
MCHC RBC AUTO-ENTMCNC: 33.9 G/DL (ref 31.5–35.7)
MCV RBC AUTO: 94.9 FL (ref 79–97)
MONOCYTES # BLD AUTO: 1.75 10*3/MM3 (ref 0.1–0.9)
MONOCYTES NFR BLD AUTO: 9.9 % (ref 5–12)
NEUTROPHILS NFR BLD AUTO: 14.41 10*3/MM3 (ref 1.7–7)
NEUTROPHILS NFR BLD AUTO: 81.6 % (ref 42.7–76)
NRBC BLD AUTO-RTO: 0.8 /100 WBC (ref 0–0.2)
NT-PROBNP SERPL-MCNC: 709 PG/ML (ref 0–1800)
PLATELET # BLD AUTO: 261 10*3/MM3 (ref 140–450)
PMV BLD AUTO: 9.8 FL (ref 6–12)
POTASSIUM SERPL-SCNC: 3.4 MMOL/L (ref 3.5–5.2)
PROCALCITONIN SERPL-MCNC: 0.09 NG/ML (ref 0–0.25)
PROT SERPL-MCNC: 6.6 G/DL (ref 6–8.5)
PROTHROMBIN TIME: 16.4 SECONDS (ref 11.7–14.2)
QT INTERVAL: 378 MS
RBC # BLD AUTO: 3.36 10*6/MM3 (ref 3.77–5.28)
RHINOVIRUS RNA SPEC NAA+PROBE: NOT DETECTED
RSV RNA NPH QL NAA+NON-PROBE: NOT DETECTED
SARS-COV-2 RNA NPH QL NAA+NON-PROBE: NOT DETECTED
SODIUM SERPL-SCNC: 135 MMOL/L (ref 136–145)
TROPONIN T SERPL-MCNC: <0.01 NG/ML (ref 0–0.03)
WBC NRBC COR # BLD: 17.66 10*3/MM3 (ref 3.4–10.8)
WHOLE BLOOD HOLD COAG: NORMAL
WHOLE BLOOD HOLD SPECIMEN: NORMAL

## 2022-07-09 PROCEDURE — 80053 COMPREHEN METABOLIC PANEL: CPT | Performed by: EMERGENCY MEDICINE

## 2022-07-09 PROCEDURE — 93005 ELECTROCARDIOGRAM TRACING: CPT | Performed by: EMERGENCY MEDICINE

## 2022-07-09 PROCEDURE — 87040 BLOOD CULTURE FOR BACTERIA: CPT | Performed by: EMERGENCY MEDICINE

## 2022-07-09 PROCEDURE — 71045 X-RAY EXAM CHEST 1 VIEW: CPT

## 2022-07-09 PROCEDURE — 25010000002 CEFTRIAXONE PER 250 MG: Performed by: EMERGENCY MEDICINE

## 2022-07-09 PROCEDURE — 94640 AIRWAY INHALATION TREATMENT: CPT

## 2022-07-09 PROCEDURE — 93010 ELECTROCARDIOGRAM REPORT: CPT | Performed by: INTERNAL MEDICINE

## 2022-07-09 PROCEDURE — G0378 HOSPITAL OBSERVATION PER HR: HCPCS

## 2022-07-09 PROCEDURE — 84145 PROCALCITONIN (PCT): CPT | Performed by: EMERGENCY MEDICINE

## 2022-07-09 PROCEDURE — 84484 ASSAY OF TROPONIN QUANT: CPT | Performed by: EMERGENCY MEDICINE

## 2022-07-09 PROCEDURE — 99285 EMERGENCY DEPT VISIT HI MDM: CPT

## 2022-07-09 PROCEDURE — 85025 COMPLETE CBC W/AUTO DIFF WBC: CPT | Performed by: EMERGENCY MEDICINE

## 2022-07-09 PROCEDURE — 83605 ASSAY OF LACTIC ACID: CPT | Performed by: EMERGENCY MEDICINE

## 2022-07-09 PROCEDURE — 0202U NFCT DS 22 TRGT SARS-COV-2: CPT | Performed by: EMERGENCY MEDICINE

## 2022-07-09 PROCEDURE — 85610 PROTHROMBIN TIME: CPT | Performed by: EMERGENCY MEDICINE

## 2022-07-09 PROCEDURE — 63710000001 PREDNISONE PER 5 MG: Performed by: INTERNAL MEDICINE

## 2022-07-09 PROCEDURE — 83880 ASSAY OF NATRIURETIC PEPTIDE: CPT | Performed by: EMERGENCY MEDICINE

## 2022-07-09 RX ORDER — VERAPAMIL HYDROCHLORIDE 360 MG/1
360 CAPSULE, DELAYED RELEASE PELLETS ORAL NIGHTLY
Status: DISCONTINUED | OUTPATIENT
Start: 2022-07-09 | End: 2022-07-09 | Stop reason: CLARIF

## 2022-07-09 RX ORDER — POTASSIUM CHLORIDE 750 MG/1
10 TABLET, FILM COATED, EXTENDED RELEASE ORAL DAILY
Status: DISCONTINUED | OUTPATIENT
Start: 2022-07-09 | End: 2022-07-19 | Stop reason: HOSPADM

## 2022-07-09 RX ORDER — LOSARTAN POTASSIUM 100 MG/1
100 TABLET ORAL DAILY
Status: DISCONTINUED | OUTPATIENT
Start: 2022-07-09 | End: 2022-07-19 | Stop reason: HOSPADM

## 2022-07-09 RX ORDER — POTASSIUM CHLORIDE 29.8 MG/ML
20 INJECTION INTRAVENOUS
Status: DISCONTINUED | OUTPATIENT
Start: 2022-07-09 | End: 2022-07-19 | Stop reason: HOSPADM

## 2022-07-09 RX ORDER — ALBUTEROL SULFATE 2.5 MG/3ML
2.5 SOLUTION RESPIRATORY (INHALATION) ONCE
Status: COMPLETED | OUTPATIENT
Start: 2022-07-09 | End: 2022-07-09

## 2022-07-09 RX ORDER — FERROUS SULFATE 325(65) MG
325 TABLET ORAL
Status: DISCONTINUED | OUTPATIENT
Start: 2022-07-10 | End: 2022-07-19 | Stop reason: HOSPADM

## 2022-07-09 RX ORDER — POTASSIUM CHLORIDE 750 MG/1
10 CAPSULE, EXTENDED RELEASE ORAL DAILY
Status: DISCONTINUED | OUTPATIENT
Start: 2022-07-09 | End: 2022-07-09 | Stop reason: CLARIF

## 2022-07-09 RX ORDER — MULTIPLE VITAMINS W/ MINERALS TAB 9MG-400MCG
1 TAB ORAL DAILY
Status: DISCONTINUED | OUTPATIENT
Start: 2022-07-09 | End: 2022-07-19 | Stop reason: HOSPADM

## 2022-07-09 RX ORDER — SODIUM CHLORIDE FOR INHALATION 0.9 %
3 VIAL, NEBULIZER (ML) INHALATION 2 TIMES DAILY
Status: DISCONTINUED | OUTPATIENT
Start: 2022-07-09 | End: 2022-07-18

## 2022-07-09 RX ORDER — ATORVASTATIN CALCIUM 20 MG/1
40 TABLET, FILM COATED ORAL DAILY
Status: DISCONTINUED | OUTPATIENT
Start: 2022-07-09 | End: 2022-07-19 | Stop reason: HOSPADM

## 2022-07-09 RX ORDER — BENZONATATE 100 MG/1
200 CAPSULE ORAL 3 TIMES DAILY PRN
Status: DISCONTINUED | OUTPATIENT
Start: 2022-07-09 | End: 2022-07-19 | Stop reason: HOSPADM

## 2022-07-09 RX ORDER — NITROGLYCERIN 0.4 MG/1
0.4 TABLET SUBLINGUAL
Status: DISCONTINUED | OUTPATIENT
Start: 2022-07-09 | End: 2022-07-19 | Stop reason: HOSPADM

## 2022-07-09 RX ORDER — SODIUM CHLORIDE 0.9 % (FLUSH) 0.9 %
10 SYRINGE (ML) INJECTION AS NEEDED
Status: DISCONTINUED | OUTPATIENT
Start: 2022-07-09 | End: 2022-07-19 | Stop reason: HOSPADM

## 2022-07-09 RX ORDER — ONDANSETRON 2 MG/ML
4 INJECTION INTRAMUSCULAR; INTRAVENOUS EVERY 6 HOURS PRN
Status: DISCONTINUED | OUTPATIENT
Start: 2022-07-09 | End: 2022-07-19 | Stop reason: HOSPADM

## 2022-07-09 RX ORDER — PREDNISONE 10 MG/1
10 TABLET ORAL DAILY
Status: DISCONTINUED | OUTPATIENT
Start: 2022-07-09 | End: 2022-07-19 | Stop reason: HOSPADM

## 2022-07-09 RX ORDER — FLUTICASONE PROPIONATE 50 MCG
2 SPRAY, SUSPENSION (ML) NASAL NIGHTLY PRN
Status: DISCONTINUED | OUTPATIENT
Start: 2022-07-09 | End: 2022-07-19 | Stop reason: HOSPADM

## 2022-07-09 RX ORDER — SODIUM CHLORIDE 0.9 % (FLUSH) 0.9 %
10 SYRINGE (ML) INJECTION EVERY 12 HOURS SCHEDULED
Status: DISCONTINUED | OUTPATIENT
Start: 2022-07-09 | End: 2022-07-19 | Stop reason: HOSPADM

## 2022-07-09 RX ORDER — POTASSIUM CHLORIDE 750 MG/1
40 TABLET, FILM COATED, EXTENDED RELEASE ORAL AS NEEDED
Status: DISCONTINUED | OUTPATIENT
Start: 2022-07-09 | End: 2022-07-19 | Stop reason: HOSPADM

## 2022-07-09 RX ORDER — IPRATROPIUM BROMIDE AND ALBUTEROL SULFATE 2.5; .5 MG/3ML; MG/3ML
3 SOLUTION RESPIRATORY (INHALATION) ONCE
Status: COMPLETED | OUTPATIENT
Start: 2022-07-09 | End: 2022-07-09

## 2022-07-09 RX ORDER — BUDESONIDE AND FORMOTEROL FUMARATE DIHYDRATE 160; 4.5 UG/1; UG/1
2 AEROSOL RESPIRATORY (INHALATION)
Status: DISCONTINUED | OUTPATIENT
Start: 2022-07-09 | End: 2022-07-19

## 2022-07-09 RX ORDER — L.ACID,PARA/B.BIFIDUM/S.THERM 8B CELL
1 CAPSULE ORAL DAILY
Status: DISCONTINUED | OUTPATIENT
Start: 2022-07-09 | End: 2022-07-19 | Stop reason: HOSPADM

## 2022-07-09 RX ORDER — ALBUTEROL SULFATE 90 UG/1
2 AEROSOL, METERED RESPIRATORY (INHALATION)
Status: DISCONTINUED | OUTPATIENT
Start: 2022-07-09 | End: 2022-07-10

## 2022-07-09 RX ORDER — IPRATROPIUM BROMIDE AND ALBUTEROL SULFATE 2.5; .5 MG/3ML; MG/3ML
3 SOLUTION RESPIRATORY (INHALATION) EVERY 4 HOURS PRN
Status: DISCONTINUED | OUTPATIENT
Start: 2022-07-09 | End: 2022-07-12

## 2022-07-09 RX ORDER — UREA 10 %
3 LOTION (ML) TOPICAL NIGHTLY PRN
Status: DISCONTINUED | OUTPATIENT
Start: 2022-07-09 | End: 2022-07-19 | Stop reason: HOSPADM

## 2022-07-09 RX ORDER — MELATONIN
1000 DAILY
Status: DISCONTINUED | OUTPATIENT
Start: 2022-07-09 | End: 2022-07-19 | Stop reason: HOSPADM

## 2022-07-09 RX ORDER — ACETAMINOPHEN 325 MG/1
650 TABLET ORAL EVERY 4 HOURS PRN
Status: DISCONTINUED | OUTPATIENT
Start: 2022-07-09 | End: 2022-07-19 | Stop reason: HOSPADM

## 2022-07-09 RX ORDER — POTASSIUM CHLORIDE 1.5 G/1.77G
40 POWDER, FOR SOLUTION ORAL AS NEEDED
Status: DISCONTINUED | OUTPATIENT
Start: 2022-07-09 | End: 2022-07-19 | Stop reason: HOSPADM

## 2022-07-09 RX ORDER — CETIRIZINE HYDROCHLORIDE 10 MG/1
5 TABLET ORAL DAILY
Status: DISCONTINUED | OUTPATIENT
Start: 2022-07-09 | End: 2022-07-19 | Stop reason: HOSPADM

## 2022-07-09 RX ORDER — ACETAMINOPHEN 160 MG/5ML
650 SOLUTION ORAL EVERY 4 HOURS PRN
Status: DISCONTINUED | OUTPATIENT
Start: 2022-07-09 | End: 2022-07-19 | Stop reason: HOSPADM

## 2022-07-09 RX ORDER — CALCIUM CARBONATE 200(500)MG
1 TABLET,CHEWABLE ORAL 2 TIMES DAILY PRN
Status: DISCONTINUED | OUTPATIENT
Start: 2022-07-09 | End: 2022-07-19 | Stop reason: HOSPADM

## 2022-07-09 RX ORDER — TORSEMIDE 20 MG/1
20 TABLET ORAL 2 TIMES DAILY
Status: DISCONTINUED | OUTPATIENT
Start: 2022-07-09 | End: 2022-07-11

## 2022-07-09 RX ORDER — ACETAMINOPHEN 650 MG/1
650 SUPPOSITORY RECTAL EVERY 4 HOURS PRN
Status: DISCONTINUED | OUTPATIENT
Start: 2022-07-09 | End: 2022-07-19 | Stop reason: HOSPADM

## 2022-07-09 RX ADMIN — ATORVASTATIN CALCIUM 40 MG: 20 TABLET, FILM COATED ORAL at 22:36

## 2022-07-09 RX ADMIN — Medication 10 ML: at 22:41

## 2022-07-09 RX ADMIN — ALBUTEROL SULFATE 2.5 MG: 2.5 SOLUTION RESPIRATORY (INHALATION) at 12:21

## 2022-07-09 RX ADMIN — Medication 1000 UNITS: at 22:38

## 2022-07-09 RX ADMIN — VERAPAMIL HYDROCHLORIDE 360 MG: 180 TABLET, FILM COATED, EXTENDED RELEASE ORAL at 22:42

## 2022-07-09 RX ADMIN — Medication 1 CAPSULE: at 22:39

## 2022-07-09 RX ADMIN — POTASSIUM CHLORIDE 10 MEQ: 10 TABLET, EXTENDED RELEASE ORAL at 22:37

## 2022-07-09 RX ADMIN — CEFTRIAXONE 1 G: 1 INJECTION, POWDER, FOR SOLUTION INTRAMUSCULAR; INTRAVENOUS at 13:37

## 2022-07-09 RX ADMIN — LOSARTAN POTASSIUM 100 MG: 100 TABLET, FILM COATED ORAL at 22:39

## 2022-07-09 RX ADMIN — DOXYCYCLINE 100 MG: 100 INJECTION, POWDER, LYOPHILIZED, FOR SOLUTION INTRAVENOUS at 15:44

## 2022-07-09 RX ADMIN — IPRATROPIUM BROMIDE AND ALBUTEROL SULFATE 3 ML: .5; 3 SOLUTION RESPIRATORY (INHALATION) at 12:21

## 2022-07-09 RX ADMIN — APIXABAN 2.5 MG: 2.5 TABLET, FILM COATED ORAL at 16:09

## 2022-07-09 RX ADMIN — CETIRIZINE HYDROCHLORIDE 5 MG: 10 TABLET ORAL at 22:37

## 2022-07-09 RX ADMIN — BENZONATATE 200 MG: 100 CAPSULE ORAL at 22:36

## 2022-07-09 RX ADMIN — MULTIPLE VITAMINS W/ MINERALS TAB 1 TABLET: TAB at 22:39

## 2022-07-09 RX ADMIN — Medication 3 MG: at 22:36

## 2022-07-10 LAB
L PNEUMO1 AG UR QL IA: NEGATIVE
S PNEUM AG SPEC QL LA: NEGATIVE

## 2022-07-10 PROCEDURE — 94799 UNLISTED PULMONARY SVC/PX: CPT

## 2022-07-10 PROCEDURE — 25010000002 CEFTRIAXONE PER 250 MG: Performed by: INTERNAL MEDICINE

## 2022-07-10 PROCEDURE — G0378 HOSPITAL OBSERVATION PER HR: HCPCS

## 2022-07-10 PROCEDURE — 87449 NOS EACH ORGANISM AG IA: CPT | Performed by: STUDENT IN AN ORGANIZED HEALTH CARE EDUCATION/TRAINING PROGRAM

## 2022-07-10 PROCEDURE — 63710000001 PREDNISONE PER 5 MG: Performed by: INTERNAL MEDICINE

## 2022-07-10 PROCEDURE — 87899 AGENT NOS ASSAY W/OPTIC: CPT | Performed by: STUDENT IN AN ORGANIZED HEALTH CARE EDUCATION/TRAINING PROGRAM

## 2022-07-10 RX ORDER — GUAIFENESIN 600 MG/1
600 TABLET, EXTENDED RELEASE ORAL EVERY 12 HOURS SCHEDULED
Status: DISCONTINUED | OUTPATIENT
Start: 2022-07-10 | End: 2022-07-19 | Stop reason: HOSPADM

## 2022-07-10 RX ORDER — SODIUM CHLORIDE FOR INHALATION 3 %
4 VIAL, NEBULIZER (ML) INHALATION ONCE
Status: COMPLETED | OUTPATIENT
Start: 2022-07-10 | End: 2022-07-10

## 2022-07-10 RX ORDER — ALBUTEROL SULFATE 2.5 MG/3ML
2.5 SOLUTION RESPIRATORY (INHALATION)
Status: DISCONTINUED | OUTPATIENT
Start: 2022-07-10 | End: 2022-07-11

## 2022-07-10 RX ADMIN — DOXYCYCLINE 100 MG: 100 INJECTION, POWDER, LYOPHILIZED, FOR SOLUTION INTRAVENOUS at 09:25

## 2022-07-10 RX ADMIN — ALBUTEROL SULFATE 2.5 MG: 2.5 SOLUTION RESPIRATORY (INHALATION) at 16:40

## 2022-07-10 RX ADMIN — Medication 10 ML: at 21:00

## 2022-07-10 RX ADMIN — PREDNISONE 10 MG: 10 TABLET ORAL at 09:25

## 2022-07-10 RX ADMIN — Medication 10 ML: at 09:26

## 2022-07-10 RX ADMIN — Medication 3 MG: at 21:14

## 2022-07-10 RX ADMIN — BENZONATATE 200 MG: 100 CAPSULE ORAL at 11:19

## 2022-07-10 RX ADMIN — MULTIPLE VITAMINS W/ MINERALS TAB 1 TABLET: TAB at 09:25

## 2022-07-10 RX ADMIN — VERAPAMIL HYDROCHLORIDE 360 MG: 180 TABLET, FILM COATED, EXTENDED RELEASE ORAL at 21:00

## 2022-07-10 RX ADMIN — ATORVASTATIN CALCIUM 40 MG: 20 TABLET, FILM COATED ORAL at 09:24

## 2022-07-10 RX ADMIN — DOXYCYCLINE 100 MG: 100 INJECTION, POWDER, LYOPHILIZED, FOR SOLUTION INTRAVENOUS at 21:00

## 2022-07-10 RX ADMIN — SODIUM CHLORIDE 30 MG/ML INHALATION SOLUTION 4 ML: 30 SOLUTION INHALANT at 16:44

## 2022-07-10 RX ADMIN — Medication 1000 UNITS: at 09:25

## 2022-07-10 RX ADMIN — ISODIUM CHLORIDE 3 ML: 0.03 SOLUTION RESPIRATORY (INHALATION) at 10:40

## 2022-07-10 RX ADMIN — Medication 1 CAPSULE: at 09:25

## 2022-07-10 RX ADMIN — POTASSIUM CHLORIDE 10 MEQ: 10 TABLET, EXTENDED RELEASE ORAL at 09:24

## 2022-07-10 RX ADMIN — TORSEMIDE 20 MG: 20 TABLET ORAL at 09:25

## 2022-07-10 RX ADMIN — CETIRIZINE HYDROCHLORIDE 5 MG: 10 TABLET ORAL at 09:24

## 2022-07-10 RX ADMIN — CEFTRIAXONE SODIUM 1 G: 1 INJECTION, POWDER, FOR SOLUTION INTRAMUSCULAR; INTRAVENOUS at 11:12

## 2022-07-10 RX ADMIN — BUDESONIDE AND FORMOTEROL FUMARATE DIHYDRATE 2 PUFF: 160; 4.5 AEROSOL RESPIRATORY (INHALATION) at 07:29

## 2022-07-10 RX ADMIN — APIXABAN 2.5 MG: 2.5 TABLET, FILM COATED ORAL at 21:00

## 2022-07-10 RX ADMIN — ALBUTEROL SULFATE 2.5 MG: 2.5 SOLUTION RESPIRATORY (INHALATION) at 10:37

## 2022-07-10 RX ADMIN — GUAIFENESIN 600 MG: 600 TABLET, EXTENDED RELEASE ORAL at 21:00

## 2022-07-10 RX ADMIN — APIXABAN 2.5 MG: 2.5 TABLET, FILM COATED ORAL at 09:25

## 2022-07-10 RX ADMIN — LOSARTAN POTASSIUM 100 MG: 100 TABLET, FILM COATED ORAL at 09:25

## 2022-07-10 RX ADMIN — BUDESONIDE AND FORMOTEROL FUMARATE DIHYDRATE 2 PUFF: 160; 4.5 AEROSOL RESPIRATORY (INHALATION) at 19:42

## 2022-07-10 RX ADMIN — FERROUS SULFATE TAB 325 MG (65 MG ELEMENTAL FE) 325 MG: 325 (65 FE) TAB at 09:24

## 2022-07-11 ENCOUNTER — APPOINTMENT (OUTPATIENT)
Dept: GENERAL RADIOLOGY | Facility: HOSPITAL | Age: 87
End: 2022-07-11

## 2022-07-11 LAB
ANION GAP SERPL CALCULATED.3IONS-SCNC: 12 MMOL/L (ref 5–15)
BUN SERPL-MCNC: 13 MG/DL (ref 8–23)
BUN/CREAT SERPL: 16.7 (ref 7–25)
CALCIUM SPEC-SCNC: 9.5 MG/DL (ref 8.2–9.6)
CHLORIDE SERPL-SCNC: 101 MMOL/L (ref 98–107)
CO2 SERPL-SCNC: 26 MMOL/L (ref 22–29)
CREAT SERPL-MCNC: 0.78 MG/DL (ref 0.57–1)
DEPRECATED RDW RBC AUTO: 44 FL (ref 37–54)
EGFRCR SERPLBLD CKD-EPI 2021: 71.8 ML/MIN/1.73
ERYTHROCYTE [DISTWIDTH] IN BLOOD BY AUTOMATED COUNT: 13.1 % (ref 12.3–15.4)
GLUCOSE SERPL-MCNC: 102 MG/DL (ref 65–99)
HCT VFR BLD AUTO: 30.3 % (ref 34–46.6)
HGB BLD-MCNC: 10.6 G/DL (ref 12–15.9)
MCH RBC QN AUTO: 32.6 PG (ref 26.6–33)
MCHC RBC AUTO-ENTMCNC: 35 G/DL (ref 31.5–35.7)
MCV RBC AUTO: 93.2 FL (ref 79–97)
PLATELET # BLD AUTO: 297 10*3/MM3 (ref 140–450)
PMV BLD AUTO: 10 FL (ref 6–12)
POTASSIUM SERPL-SCNC: 3.6 MMOL/L (ref 3.5–5.2)
RBC # BLD AUTO: 3.25 10*6/MM3 (ref 3.77–5.28)
SODIUM SERPL-SCNC: 139 MMOL/L (ref 136–145)
WBC NRBC COR # BLD: 17.79 10*3/MM3 (ref 3.4–10.8)

## 2022-07-11 PROCEDURE — 94799 UNLISTED PULMONARY SVC/PX: CPT

## 2022-07-11 PROCEDURE — 94760 N-INVAS EAR/PLS OXIMETRY 1: CPT

## 2022-07-11 PROCEDURE — 94761 N-INVAS EAR/PLS OXIMETRY MLT: CPT

## 2022-07-11 PROCEDURE — 94664 DEMO&/EVAL PT USE INHALER: CPT

## 2022-07-11 PROCEDURE — 71046 X-RAY EXAM CHEST 2 VIEWS: CPT

## 2022-07-11 PROCEDURE — 94669 MECHANICAL CHEST WALL OSCILL: CPT

## 2022-07-11 PROCEDURE — 63710000001 PREDNISONE PER 5 MG: Performed by: INTERNAL MEDICINE

## 2022-07-11 PROCEDURE — 85027 COMPLETE CBC AUTOMATED: CPT | Performed by: STUDENT IN AN ORGANIZED HEALTH CARE EDUCATION/TRAINING PROGRAM

## 2022-07-11 PROCEDURE — 80048 BASIC METABOLIC PNL TOTAL CA: CPT | Performed by: STUDENT IN AN ORGANIZED HEALTH CARE EDUCATION/TRAINING PROGRAM

## 2022-07-11 PROCEDURE — 25010000002 CEFTRIAXONE PER 250 MG: Performed by: INTERNAL MEDICINE

## 2022-07-11 RX ORDER — TORSEMIDE 20 MG/1
40 TABLET ORAL 2 TIMES DAILY
Status: DISCONTINUED | OUTPATIENT
Start: 2022-07-11 | End: 2022-07-16

## 2022-07-11 RX ORDER — IPRATROPIUM BROMIDE AND ALBUTEROL SULFATE 2.5; .5 MG/3ML; MG/3ML
3 SOLUTION RESPIRATORY (INHALATION)
Status: DISCONTINUED | OUTPATIENT
Start: 2022-07-11 | End: 2022-07-19 | Stop reason: HOSPADM

## 2022-07-11 RX ORDER — DEXTROMETHORPHAN POLISTIREX 30 MG/5ML
60 SUSPENSION ORAL EVERY 12 HOURS SCHEDULED
Status: DISCONTINUED | OUTPATIENT
Start: 2022-07-11 | End: 2022-07-19

## 2022-07-11 RX ORDER — ACETYLCYSTEINE 200 MG/ML
3 SOLUTION ORAL; RESPIRATORY (INHALATION)
Status: DISCONTINUED | OUTPATIENT
Start: 2022-07-11 | End: 2022-07-19

## 2022-07-11 RX ORDER — IPRATROPIUM BROMIDE AND ALBUTEROL SULFATE 2.5; .5 MG/3ML; MG/3ML
3 SOLUTION RESPIRATORY (INHALATION) EVERY 4 HOURS PRN
Status: DISCONTINUED | OUTPATIENT
Start: 2022-07-11 | End: 2022-07-19 | Stop reason: HOSPADM

## 2022-07-11 RX ADMIN — TORSEMIDE 20 MG: 20 TABLET ORAL at 09:19

## 2022-07-11 RX ADMIN — APIXABAN 2.5 MG: 2.5 TABLET, FILM COATED ORAL at 22:25

## 2022-07-11 RX ADMIN — ISODIUM CHLORIDE 3 ML: 0.03 SOLUTION RESPIRATORY (INHALATION) at 11:24

## 2022-07-11 RX ADMIN — IPRATROPIUM BROMIDE AND ALBUTEROL SULFATE 3 ML: .5; 3 SOLUTION RESPIRATORY (INHALATION) at 14:52

## 2022-07-11 RX ADMIN — CETIRIZINE HYDROCHLORIDE 5 MG: 10 TABLET ORAL at 09:21

## 2022-07-11 RX ADMIN — ACETYLCYSTEINE 3 ML: 200 SOLUTION ORAL; RESPIRATORY (INHALATION) at 21:10

## 2022-07-11 RX ADMIN — APIXABAN 2.5 MG: 2.5 TABLET, FILM COATED ORAL at 09:19

## 2022-07-11 RX ADMIN — CEFTRIAXONE SODIUM 1 G: 1 INJECTION, POWDER, FOR SOLUTION INTRAMUSCULAR; INTRAVENOUS at 12:50

## 2022-07-11 RX ADMIN — IPRATROPIUM BROMIDE AND ALBUTEROL SULFATE 3 ML: .5; 3 SOLUTION RESPIRATORY (INHALATION) at 07:32

## 2022-07-11 RX ADMIN — ACETAMINOPHEN 650 MG: 325 TABLET ORAL at 22:24

## 2022-07-11 RX ADMIN — PREDNISONE 10 MG: 10 TABLET ORAL at 09:17

## 2022-07-11 RX ADMIN — ACETYLCYSTEINE 3 ML: 200 SOLUTION ORAL; RESPIRATORY (INHALATION) at 07:33

## 2022-07-11 RX ADMIN — GUAIFENESIN 600 MG: 600 TABLET, EXTENDED RELEASE ORAL at 09:18

## 2022-07-11 RX ADMIN — ATORVASTATIN CALCIUM 40 MG: 20 TABLET, FILM COATED ORAL at 22:25

## 2022-07-11 RX ADMIN — FERROUS SULFATE TAB 325 MG (65 MG ELEMENTAL FE) 325 MG: 325 (65 FE) TAB at 09:17

## 2022-07-11 RX ADMIN — ACETYLCYSTEINE 3 ML: 200 SOLUTION ORAL; RESPIRATORY (INHALATION) at 14:52

## 2022-07-11 RX ADMIN — ACETAMINOPHEN 650 MG: 325 TABLET ORAL at 12:09

## 2022-07-11 RX ADMIN — POTASSIUM CHLORIDE 10 MEQ: 10 TABLET, EXTENDED RELEASE ORAL at 09:16

## 2022-07-11 RX ADMIN — DOXYCYCLINE 100 MG: 100 INJECTION, POWDER, LYOPHILIZED, FOR SOLUTION INTRAVENOUS at 09:24

## 2022-07-11 RX ADMIN — Medication 1 CAPSULE: at 09:17

## 2022-07-11 RX ADMIN — IPRATROPIUM BROMIDE AND ALBUTEROL SULFATE 3 ML: .5; 3 SOLUTION RESPIRATORY (INHALATION) at 21:09

## 2022-07-11 RX ADMIN — Medication 1000 UNITS: at 09:18

## 2022-07-11 RX ADMIN — DOXYCYCLINE 100 MG: 100 INJECTION, POWDER, LYOPHILIZED, FOR SOLUTION INTRAVENOUS at 22:24

## 2022-07-11 RX ADMIN — ISODIUM CHLORIDE 3 ML: 0.03 SOLUTION RESPIRATORY (INHALATION) at 21:24

## 2022-07-11 RX ADMIN — LOSARTAN POTASSIUM 100 MG: 100 TABLET, FILM COATED ORAL at 09:21

## 2022-07-11 RX ADMIN — MULTIPLE VITAMINS W/ MINERALS TAB 1 TABLET: TAB at 09:18

## 2022-07-11 RX ADMIN — VERAPAMIL HYDROCHLORIDE 360 MG: 180 TABLET, FILM COATED, EXTENDED RELEASE ORAL at 22:24

## 2022-07-11 RX ADMIN — IPRATROPIUM BROMIDE AND ALBUTEROL SULFATE 3 ML: .5; 3 SOLUTION RESPIRATORY (INHALATION) at 11:17

## 2022-07-11 RX ADMIN — DEXTROMETHORPHAN 60 MG: 30 SUSPENSION, EXTENDED RELEASE ORAL at 22:52

## 2022-07-11 RX ADMIN — TORSEMIDE 40 MG: 20 TABLET ORAL at 17:10

## 2022-07-12 ENCOUNTER — APPOINTMENT (OUTPATIENT)
Dept: CARDIOLOGY | Facility: HOSPITAL | Age: 87
End: 2022-07-12

## 2022-07-12 ENCOUNTER — TELEPHONE (OUTPATIENT)
Dept: INTERNAL MEDICINE | Facility: CLINIC | Age: 87
End: 2022-07-12

## 2022-07-12 LAB
ANION GAP SERPL CALCULATED.3IONS-SCNC: 11 MMOL/L (ref 5–15)
BH CV ECHO MEAS - ACS: 2.36 CM
BH CV ECHO MEAS - AO MAX PG: 7.3 MMHG
BH CV ECHO MEAS - AO MEAN PG: 4.4 MMHG
BH CV ECHO MEAS - AO ROOT DIAM: 3.1 CM
BH CV ECHO MEAS - AO V2 MAX: 134.7 CM/SEC
BH CV ECHO MEAS - AO V2 VTI: 21.8 CM
BH CV ECHO MEAS - AVA(I,D): 2.22 CM2
BH CV ECHO MEAS - EDV(CUBED): 29.8 ML
BH CV ECHO MEAS - EDV(MOD-SP2): 26 ML
BH CV ECHO MEAS - EDV(MOD-SP4): 36 ML
BH CV ECHO MEAS - EF(MOD-BP): 55.2 %
BH CV ECHO MEAS - EF(MOD-SP2): 61.5 %
BH CV ECHO MEAS - EF(MOD-SP4): 52.8 %
BH CV ECHO MEAS - ESV(CUBED): 10.7 ML
BH CV ECHO MEAS - ESV(MOD-SP2): 10 ML
BH CV ECHO MEAS - ESV(MOD-SP4): 17 ML
BH CV ECHO MEAS - FS: 28.9 %
BH CV ECHO MEAS - IVS/LVPW: 1.11 CM
BH CV ECHO MEAS - IVSD: 1.45 CM
BH CV ECHO MEAS - LA DIMENSION: 4.6 CM
BH CV ECHO MEAS - LV DIASTOLIC VOL/BSA (35-75): 23.2 CM2
BH CV ECHO MEAS - LV MASS(C)D: 142.5 GRAMS
BH CV ECHO MEAS - LV MAX PG: 2.3 MMHG
BH CV ECHO MEAS - LV MEAN PG: 1.31 MMHG
BH CV ECHO MEAS - LV SYSTOLIC VOL/BSA (12-30): 10.9 CM2
BH CV ECHO MEAS - LV V1 MAX: 75.8 CM/SEC
BH CV ECHO MEAS - LV V1 VTI: 14 CM
BH CV ECHO MEAS - LVIDD: 3.1 CM
BH CV ECHO MEAS - LVIDS: 2.21 CM
BH CV ECHO MEAS - LVOT AREA: 3.5 CM2
BH CV ECHO MEAS - LVOT DIAM: 2.1 CM
BH CV ECHO MEAS - LVPWD: 1.3 CM
BH CV ECHO MEAS - MR MAX PG: 85.6 MMHG
BH CV ECHO MEAS - MR MAX VEL: 462.6 CM/SEC
BH CV ECHO MEAS - MR MEAN PG: 59.8 MMHG
BH CV ECHO MEAS - MR MEAN VEL: 370.6 CM/SEC
BH CV ECHO MEAS - MR VTI: 113.5 CM
BH CV ECHO MEAS - MV DEC SLOPE: 616.8 CM/SEC2
BH CV ECHO MEAS - MV MAX PG: 5.1 MMHG
BH CV ECHO MEAS - MV MEAN PG: 2.7 MMHG
BH CV ECHO MEAS - MV P1/2T: 58.8 MSEC
BH CV ECHO MEAS - MV V2 VTI: 20.1 CM
BH CV ECHO MEAS - MVA(P1/2T): 3.7 CM2
BH CV ECHO MEAS - MVA(VTI): 2.41 CM2
BH CV ECHO MEAS - RAP SYSTOLE: 3 MMHG
BH CV ECHO MEAS - RVSP: 31 MMHG
BH CV ECHO MEAS - SI(MOD-SP2): 10.3 ML/M2
BH CV ECHO MEAS - SI(MOD-SP4): 12.2 ML/M2
BH CV ECHO MEAS - SV(LVOT): 48.4 ML
BH CV ECHO MEAS - SV(MOD-SP2): 16 ML
BH CV ECHO MEAS - SV(MOD-SP4): 19 ML
BH CV ECHO MEAS - TAPSE (>1.6): 1.15 CM
BH CV ECHO MEAS - TR MAX PG: 28 MMHG
BH CV ECHO MEAS - TR MAX VEL: 264.7 CM/SEC
BH CV XLRA - RV BASE: 2.34 CM
BH CV XLRA - RV LENGTH: 6.3 CM
BH CV XLRA - RV MID: 2.03 CM
BUN SERPL-MCNC: 15 MG/DL (ref 8–23)
BUN/CREAT SERPL: 18.1 (ref 7–25)
CALCIUM SPEC-SCNC: 9 MG/DL (ref 8.2–9.6)
CHLORIDE SERPL-SCNC: 104 MMOL/L (ref 98–107)
CO2 SERPL-SCNC: 24 MMOL/L (ref 22–29)
CREAT SERPL-MCNC: 0.83 MG/DL (ref 0.57–1)
DEPRECATED RDW RBC AUTO: 43.5 FL (ref 37–54)
EGFRCR SERPLBLD CKD-EPI 2021: 66.6 ML/MIN/1.73
ERYTHROCYTE [DISTWIDTH] IN BLOOD BY AUTOMATED COUNT: 13.1 % (ref 12.3–15.4)
GLUCOSE SERPL-MCNC: 121 MG/DL (ref 65–99)
HCT VFR BLD AUTO: 31.5 % (ref 34–46.6)
HGB BLD-MCNC: 10.8 G/DL (ref 12–15.9)
LEFT ATRIUM VOLUME INDEX: 38 ML/M2
MAXIMAL PREDICTED HEART RATE: 129 BPM
MCH RBC QN AUTO: 31.7 PG (ref 26.6–33)
MCHC RBC AUTO-ENTMCNC: 34.3 G/DL (ref 31.5–35.7)
MCV RBC AUTO: 92.4 FL (ref 79–97)
PLATELET # BLD AUTO: 302 10*3/MM3 (ref 140–450)
PMV BLD AUTO: 9.9 FL (ref 6–12)
POTASSIUM SERPL-SCNC: 4 MMOL/L (ref 3.5–5.2)
RBC # BLD AUTO: 3.41 10*6/MM3 (ref 3.77–5.28)
SINUS: 2.9 CM
SODIUM SERPL-SCNC: 139 MMOL/L (ref 136–145)
STJ: 2.43 CM
STRESS TARGET HR: 110 BPM
WBC NRBC COR # BLD: 18.53 10*3/MM3 (ref 3.4–10.8)

## 2022-07-12 PROCEDURE — 94761 N-INVAS EAR/PLS OXIMETRY MLT: CPT

## 2022-07-12 PROCEDURE — 93306 TTE W/DOPPLER COMPLETE: CPT

## 2022-07-12 PROCEDURE — 97161 PT EVAL LOW COMPLEX 20 MIN: CPT

## 2022-07-12 PROCEDURE — 93306 TTE W/DOPPLER COMPLETE: CPT | Performed by: INTERNAL MEDICINE

## 2022-07-12 PROCEDURE — 94799 UNLISTED PULMONARY SVC/PX: CPT

## 2022-07-12 PROCEDURE — 80048 BASIC METABOLIC PNL TOTAL CA: CPT | Performed by: STUDENT IN AN ORGANIZED HEALTH CARE EDUCATION/TRAINING PROGRAM

## 2022-07-12 PROCEDURE — 63710000001 PREDNISONE PER 5 MG: Performed by: INTERNAL MEDICINE

## 2022-07-12 PROCEDURE — 94664 DEMO&/EVAL PT USE INHALER: CPT

## 2022-07-12 PROCEDURE — 85027 COMPLETE CBC AUTOMATED: CPT | Performed by: STUDENT IN AN ORGANIZED HEALTH CARE EDUCATION/TRAINING PROGRAM

## 2022-07-12 PROCEDURE — 25010000002 CEFTRIAXONE PER 250 MG: Performed by: INTERNAL MEDICINE

## 2022-07-12 PROCEDURE — 94669 MECHANICAL CHEST WALL OSCILL: CPT

## 2022-07-12 RX ORDER — SODIUM CHLORIDE FOR INHALATION 7 %
4 VIAL, NEBULIZER (ML) INHALATION ONCE AS NEEDED
Status: DISCONTINUED | OUTPATIENT
Start: 2022-07-12 | End: 2022-07-19 | Stop reason: HOSPADM

## 2022-07-12 RX ORDER — ALBUTEROL SULFATE 2.5 MG/3ML
2.5 SOLUTION RESPIRATORY (INHALATION) ONCE AS NEEDED
Status: DISCONTINUED | OUTPATIENT
Start: 2022-07-12 | End: 2022-07-19 | Stop reason: HOSPADM

## 2022-07-12 RX ADMIN — IPRATROPIUM BROMIDE AND ALBUTEROL SULFATE 3 ML: .5; 3 SOLUTION RESPIRATORY (INHALATION) at 15:25

## 2022-07-12 RX ADMIN — MULTIPLE VITAMINS W/ MINERALS TAB 1 TABLET: TAB at 09:06

## 2022-07-12 RX ADMIN — POTASSIUM CHLORIDE 10 MEQ: 10 TABLET, EXTENDED RELEASE ORAL at 09:07

## 2022-07-12 RX ADMIN — TORSEMIDE 40 MG: 20 TABLET ORAL at 09:10

## 2022-07-12 RX ADMIN — DEXTROMETHORPHAN 60 MG: 30 SUSPENSION, EXTENDED RELEASE ORAL at 09:07

## 2022-07-12 RX ADMIN — DOXYCYCLINE 100 MG: 100 INJECTION, POWDER, LYOPHILIZED, FOR SOLUTION INTRAVENOUS at 09:08

## 2022-07-12 RX ADMIN — GUAIFENESIN 600 MG: 600 TABLET, EXTENDED RELEASE ORAL at 21:11

## 2022-07-12 RX ADMIN — IPRATROPIUM BROMIDE AND ALBUTEROL SULFATE 3 ML: .5; 3 SOLUTION RESPIRATORY (INHALATION) at 19:51

## 2022-07-12 RX ADMIN — CEFTRIAXONE SODIUM 1 G: 1 INJECTION, POWDER, FOR SOLUTION INTRAMUSCULAR; INTRAVENOUS at 11:25

## 2022-07-12 RX ADMIN — ACETAMINOPHEN 650 MG: 325 TABLET ORAL at 13:16

## 2022-07-12 RX ADMIN — Medication 3 MG: at 21:15

## 2022-07-12 RX ADMIN — IPRATROPIUM BROMIDE AND ALBUTEROL SULFATE 3 ML: .5; 3 SOLUTION RESPIRATORY (INHALATION) at 11:30

## 2022-07-12 RX ADMIN — APIXABAN 2.5 MG: 2.5 TABLET, FILM COATED ORAL at 21:11

## 2022-07-12 RX ADMIN — TORSEMIDE 40 MG: 20 TABLET ORAL at 17:16

## 2022-07-12 RX ADMIN — APIXABAN 2.5 MG: 2.5 TABLET, FILM COATED ORAL at 09:07

## 2022-07-12 RX ADMIN — BUDESONIDE AND FORMOTEROL FUMARATE DIHYDRATE 2 PUFF: 160; 4.5 AEROSOL RESPIRATORY (INHALATION) at 07:34

## 2022-07-12 RX ADMIN — Medication 3 MG: at 03:27

## 2022-07-12 RX ADMIN — ISODIUM CHLORIDE 3 ML: 0.03 SOLUTION RESPIRATORY (INHALATION) at 11:30

## 2022-07-12 RX ADMIN — Medication 10 ML: at 09:07

## 2022-07-12 RX ADMIN — Medication 10 ML: at 21:12

## 2022-07-12 RX ADMIN — Medication 1000 UNITS: at 09:06

## 2022-07-12 RX ADMIN — NYSTATIN 500000 UNITS: 100000 SUSPENSION ORAL at 21:12

## 2022-07-12 RX ADMIN — ACETYLCYSTEINE 3 ML: 200 SOLUTION ORAL; RESPIRATORY (INHALATION) at 19:53

## 2022-07-12 RX ADMIN — PREDNISONE 10 MG: 10 TABLET ORAL at 09:06

## 2022-07-12 RX ADMIN — ACETYLCYSTEINE 3 ML: 200 SOLUTION ORAL; RESPIRATORY (INHALATION) at 15:25

## 2022-07-12 RX ADMIN — ATORVASTATIN CALCIUM 40 MG: 20 TABLET, FILM COATED ORAL at 21:11

## 2022-07-12 RX ADMIN — DEXTROMETHORPHAN 60 MG: 30 SUSPENSION, EXTENDED RELEASE ORAL at 21:12

## 2022-07-12 RX ADMIN — LOSARTAN POTASSIUM 100 MG: 100 TABLET, FILM COATED ORAL at 09:06

## 2022-07-12 RX ADMIN — VERAPAMIL HYDROCHLORIDE 360 MG: 180 TABLET, FILM COATED, EXTENDED RELEASE ORAL at 21:11

## 2022-07-12 RX ADMIN — CETIRIZINE HYDROCHLORIDE 5 MG: 10 TABLET ORAL at 09:06

## 2022-07-12 RX ADMIN — DOXYCYCLINE 100 MG: 100 INJECTION, POWDER, LYOPHILIZED, FOR SOLUTION INTRAVENOUS at 21:12

## 2022-07-12 RX ADMIN — Medication 1 CAPSULE: at 09:06

## 2022-07-12 RX ADMIN — NYSTATIN 500000 UNITS: 100000 SUSPENSION ORAL at 09:07

## 2022-07-12 RX ADMIN — GUAIFENESIN 600 MG: 600 TABLET, EXTENDED RELEASE ORAL at 09:06

## 2022-07-12 RX ADMIN — FERROUS SULFATE TAB 325 MG (65 MG ELEMENTAL FE) 325 MG: 325 (65 FE) TAB at 09:06

## 2022-07-12 NOTE — TELEPHONE ENCOUNTER
Notified that this is okay as long as patient follows up with a visit (has not been seen since 7/21).  She expressed understanding.

## 2022-07-12 NOTE — TELEPHONE ENCOUNTER
Caller: EPI    Relationship: Other    Best call back number: [phone]    What is the best time to reach you: ANY TIME     Who are you requesting to speak with (clinical staff, provider,  specific staff member): DR. COTTER    What was the call regarding: EPI IS CALLING FOR VERBAL ORDERS FOR HOME HEALTH EVALUATION FOR NURSING AND PHYSICAL THERAPY FOR PATIENT WHEN SHE DISCHARGES HOME.  SHE STATES PATIENT HAS PNEUMONIA AND COPD IS ACTING UP A BIT AS WELL.  EPI STATES PATIENT MAY BE DISCHARGING IN THE NEXT DAY OR SO.  PLEASE CONTACT EPI WITH VERBAL ORDERS.       Do you require a callback: YES

## 2022-07-13 LAB
ANION GAP SERPL CALCULATED.3IONS-SCNC: 13.3 MMOL/L (ref 5–15)
BUN SERPL-MCNC: 18 MG/DL (ref 8–23)
BUN/CREAT SERPL: 20.9 (ref 7–25)
CALCIUM SPEC-SCNC: 9.1 MG/DL (ref 8.2–9.6)
CHLORIDE SERPL-SCNC: 99 MMOL/L (ref 98–107)
CO2 SERPL-SCNC: 26.7 MMOL/L (ref 22–29)
CREAT SERPL-MCNC: 0.86 MG/DL (ref 0.57–1)
DEPRECATED RDW RBC AUTO: 44.2 FL (ref 37–54)
EGFRCR SERPLBLD CKD-EPI 2021: 63.9 ML/MIN/1.73
ERYTHROCYTE [DISTWIDTH] IN BLOOD BY AUTOMATED COUNT: 13.3 % (ref 12.3–15.4)
GLUCOSE SERPL-MCNC: 115 MG/DL (ref 65–99)
HCT VFR BLD AUTO: 29.7 % (ref 34–46.6)
HGB BLD-MCNC: 10 G/DL (ref 12–15.9)
MAGNESIUM SERPL-MCNC: 1.9 MG/DL (ref 1.7–2.3)
MCH RBC QN AUTO: 31 PG (ref 26.6–33)
MCHC RBC AUTO-ENTMCNC: 33.7 G/DL (ref 31.5–35.7)
MCV RBC AUTO: 92 FL (ref 79–97)
PLATELET # BLD AUTO: 290 10*3/MM3 (ref 140–450)
PMV BLD AUTO: 10.1 FL (ref 6–12)
POTASSIUM SERPL-SCNC: 3.2 MMOL/L (ref 3.5–5.2)
RBC # BLD AUTO: 3.23 10*6/MM3 (ref 3.77–5.28)
SODIUM SERPL-SCNC: 139 MMOL/L (ref 136–145)
WBC NRBC COR # BLD: 17.32 10*3/MM3 (ref 3.4–10.8)

## 2022-07-13 PROCEDURE — 94799 UNLISTED PULMONARY SVC/PX: CPT

## 2022-07-13 PROCEDURE — 25010000002 CEFTRIAXONE PER 250 MG: Performed by: INTERNAL MEDICINE

## 2022-07-13 PROCEDURE — 94669 MECHANICAL CHEST WALL OSCILL: CPT

## 2022-07-13 PROCEDURE — 80048 BASIC METABOLIC PNL TOTAL CA: CPT | Performed by: STUDENT IN AN ORGANIZED HEALTH CARE EDUCATION/TRAINING PROGRAM

## 2022-07-13 PROCEDURE — 85027 COMPLETE CBC AUTOMATED: CPT | Performed by: STUDENT IN AN ORGANIZED HEALTH CARE EDUCATION/TRAINING PROGRAM

## 2022-07-13 PROCEDURE — 25010000002 FUROSEMIDE PER 20 MG: Performed by: INTERNAL MEDICINE

## 2022-07-13 PROCEDURE — 97110 THERAPEUTIC EXERCISES: CPT

## 2022-07-13 PROCEDURE — 63710000001 PREDNISONE PER 5 MG: Performed by: INTERNAL MEDICINE

## 2022-07-13 PROCEDURE — 83735 ASSAY OF MAGNESIUM: CPT | Performed by: STUDENT IN AN ORGANIZED HEALTH CARE EDUCATION/TRAINING PROGRAM

## 2022-07-13 PROCEDURE — 94761 N-INVAS EAR/PLS OXIMETRY MLT: CPT

## 2022-07-13 PROCEDURE — 94667 MNPJ CHEST WALL 1ST: CPT

## 2022-07-13 PROCEDURE — 94664 DEMO&/EVAL PT USE INHALER: CPT

## 2022-07-13 RX ORDER — FUROSEMIDE 10 MG/ML
40 INJECTION INTRAMUSCULAR; INTRAVENOUS ONCE
Status: COMPLETED | OUTPATIENT
Start: 2022-07-13 | End: 2022-07-13

## 2022-07-13 RX ADMIN — DOXYCYCLINE 100 MG: 100 INJECTION, POWDER, LYOPHILIZED, FOR SOLUTION INTRAVENOUS at 21:57

## 2022-07-13 RX ADMIN — POTASSIUM CHLORIDE 40 MEQ: 10 TABLET, EXTENDED RELEASE ORAL at 18:20

## 2022-07-13 RX ADMIN — DOXYCYCLINE 100 MG: 100 INJECTION, POWDER, LYOPHILIZED, FOR SOLUTION INTRAVENOUS at 11:08

## 2022-07-13 RX ADMIN — IPRATROPIUM BROMIDE AND ALBUTEROL SULFATE 3 ML: .5; 3 SOLUTION RESPIRATORY (INHALATION) at 15:30

## 2022-07-13 RX ADMIN — METOPROLOL TARTRATE 25 MG: 25 TABLET, FILM COATED ORAL at 19:24

## 2022-07-13 RX ADMIN — APIXABAN 2.5 MG: 2.5 TABLET, FILM COATED ORAL at 09:43

## 2022-07-13 RX ADMIN — BUDESONIDE AND FORMOTEROL FUMARATE DIHYDRATE 2 PUFF: 160; 4.5 AEROSOL RESPIRATORY (INHALATION) at 20:00

## 2022-07-13 RX ADMIN — BENZONATATE 200 MG: 100 CAPSULE ORAL at 18:34

## 2022-07-13 RX ADMIN — CEFTRIAXONE SODIUM 1 G: 1 INJECTION, POWDER, FOR SOLUTION INTRAMUSCULAR; INTRAVENOUS at 13:41

## 2022-07-13 RX ADMIN — ISODIUM CHLORIDE 3 ML: 0.03 SOLUTION RESPIRATORY (INHALATION) at 10:30

## 2022-07-13 RX ADMIN — FERROUS SULFATE TAB 325 MG (65 MG ELEMENTAL FE) 325 MG: 325 (65 FE) TAB at 09:43

## 2022-07-13 RX ADMIN — POTASSIUM CHLORIDE 30 MEQ: 10 TABLET, EXTENDED RELEASE ORAL at 10:02

## 2022-07-13 RX ADMIN — Medication 1 CAPSULE: at 09:43

## 2022-07-13 RX ADMIN — LOSARTAN POTASSIUM 100 MG: 100 TABLET, FILM COATED ORAL at 09:42

## 2022-07-13 RX ADMIN — TORSEMIDE 40 MG: 20 TABLET ORAL at 18:21

## 2022-07-13 RX ADMIN — Medication 10 ML: at 09:44

## 2022-07-13 RX ADMIN — BUDESONIDE AND FORMOTEROL FUMARATE DIHYDRATE 2 PUFF: 160; 4.5 AEROSOL RESPIRATORY (INHALATION) at 07:00

## 2022-07-13 RX ADMIN — ACETYLCYSTEINE 3 ML: 200 SOLUTION ORAL; RESPIRATORY (INHALATION) at 07:23

## 2022-07-13 RX ADMIN — ACETYLCYSTEINE 3 ML: 200 SOLUTION ORAL; RESPIRATORY (INHALATION) at 19:46

## 2022-07-13 RX ADMIN — PREDNISONE 10 MG: 10 TABLET ORAL at 09:43

## 2022-07-13 RX ADMIN — Medication 10 ML: at 21:58

## 2022-07-13 RX ADMIN — Medication 1000 UNITS: at 09:43

## 2022-07-13 RX ADMIN — TORSEMIDE 40 MG: 20 TABLET ORAL at 09:42

## 2022-07-13 RX ADMIN — ISODIUM CHLORIDE 3 ML: 0.03 SOLUTION RESPIRATORY (INHALATION) at 19:55

## 2022-07-13 RX ADMIN — APIXABAN 2.5 MG: 2.5 TABLET, FILM COATED ORAL at 21:58

## 2022-07-13 RX ADMIN — IPRATROPIUM BROMIDE AND ALBUTEROL SULFATE 3 ML: 2.5; .5 SOLUTION RESPIRATORY (INHALATION) at 05:04

## 2022-07-13 RX ADMIN — ACETAMINOPHEN 650 MG: 325 TABLET ORAL at 09:40

## 2022-07-13 RX ADMIN — MULTIPLE VITAMINS W/ MINERALS TAB 1 TABLET: TAB at 09:43

## 2022-07-13 RX ADMIN — DEXTROMETHORPHAN 60 MG: 30 SUSPENSION, EXTENDED RELEASE ORAL at 21:57

## 2022-07-13 RX ADMIN — VERAPAMIL HYDROCHLORIDE 360 MG: 180 TABLET, FILM COATED, EXTENDED RELEASE ORAL at 21:57

## 2022-07-13 RX ADMIN — CETIRIZINE HYDROCHLORIDE 5 MG: 10 TABLET ORAL at 09:43

## 2022-07-13 RX ADMIN — FUROSEMIDE 40 MG: 10 INJECTION, SOLUTION INTRAMUSCULAR; INTRAVENOUS at 13:41

## 2022-07-13 RX ADMIN — IPRATROPIUM BROMIDE AND ALBUTEROL SULFATE 3 ML: .5; 3 SOLUTION RESPIRATORY (INHALATION) at 10:30

## 2022-07-13 RX ADMIN — ACETYLCYSTEINE 3 ML: 200 SOLUTION ORAL; RESPIRATORY (INHALATION) at 15:30

## 2022-07-13 RX ADMIN — GUAIFENESIN 600 MG: 600 TABLET, EXTENDED RELEASE ORAL at 09:42

## 2022-07-13 RX ADMIN — POTASSIUM CHLORIDE 10 MEQ: 10 TABLET, EXTENDED RELEASE ORAL at 09:57

## 2022-07-13 RX ADMIN — DEXTROMETHORPHAN 60 MG: 30 SUSPENSION, EXTENDED RELEASE ORAL at 09:41

## 2022-07-13 RX ADMIN — IPRATROPIUM BROMIDE AND ALBUTEROL SULFATE 3 ML: .5; 3 SOLUTION RESPIRATORY (INHALATION) at 07:24

## 2022-07-13 RX ADMIN — IPRATROPIUM BROMIDE AND ALBUTEROL SULFATE 3 ML: .5; 3 SOLUTION RESPIRATORY (INHALATION) at 19:46

## 2022-07-14 LAB
ALBUMIN SERPL-MCNC: 2.9 G/DL (ref 3.5–5.2)
ANION GAP SERPL CALCULATED.3IONS-SCNC: 11 MMOL/L (ref 5–15)
BACTERIA SPEC AEROBE CULT: NORMAL
BACTERIA SPEC AEROBE CULT: NORMAL
BUN SERPL-MCNC: 16 MG/DL (ref 8–23)
BUN/CREAT SERPL: 21.6 (ref 7–25)
CALCIUM SPEC-SCNC: 8.8 MG/DL (ref 8.2–9.6)
CHLORIDE SERPL-SCNC: 101 MMOL/L (ref 98–107)
CO2 SERPL-SCNC: 24 MMOL/L (ref 22–29)
CREAT SERPL-MCNC: 0.74 MG/DL (ref 0.57–1)
DEPRECATED RDW RBC AUTO: 43.8 FL (ref 37–54)
EGFRCR SERPLBLD CKD-EPI 2021: 76.5 ML/MIN/1.73
ERYTHROCYTE [DISTWIDTH] IN BLOOD BY AUTOMATED COUNT: 13.3 % (ref 12.3–15.4)
GLUCOSE SERPL-MCNC: 112 MG/DL (ref 65–99)
HCT VFR BLD AUTO: 27.8 % (ref 34–46.6)
HGB BLD-MCNC: 9.5 G/DL (ref 12–15.9)
MAGNESIUM SERPL-MCNC: 1.8 MG/DL (ref 1.7–2.3)
MCH RBC QN AUTO: 31.3 PG (ref 26.6–33)
MCHC RBC AUTO-ENTMCNC: 34.2 G/DL (ref 31.5–35.7)
MCV RBC AUTO: 91.4 FL (ref 79–97)
PHOSPHATE SERPL-MCNC: 2.6 MG/DL (ref 2.5–4.5)
PLATELET # BLD AUTO: 288 10*3/MM3 (ref 140–450)
PMV BLD AUTO: 9.9 FL (ref 6–12)
POTASSIUM SERPL-SCNC: 3.7 MMOL/L (ref 3.5–5.2)
RBC # BLD AUTO: 3.04 10*6/MM3 (ref 3.77–5.28)
SODIUM SERPL-SCNC: 136 MMOL/L (ref 136–145)
WBC NRBC COR # BLD: 14.63 10*3/MM3 (ref 3.4–10.8)

## 2022-07-14 PROCEDURE — 94799 UNLISTED PULMONARY SVC/PX: CPT

## 2022-07-14 PROCEDURE — 85027 COMPLETE CBC AUTOMATED: CPT | Performed by: INTERNAL MEDICINE

## 2022-07-14 PROCEDURE — 97116 GAIT TRAINING THERAPY: CPT

## 2022-07-14 PROCEDURE — 97530 THERAPEUTIC ACTIVITIES: CPT

## 2022-07-14 PROCEDURE — 83735 ASSAY OF MAGNESIUM: CPT | Performed by: STUDENT IN AN ORGANIZED HEALTH CARE EDUCATION/TRAINING PROGRAM

## 2022-07-14 PROCEDURE — 25010000002 FUROSEMIDE PER 20 MG: Performed by: INTERNAL MEDICINE

## 2022-07-14 PROCEDURE — 94761 N-INVAS EAR/PLS OXIMETRY MLT: CPT

## 2022-07-14 PROCEDURE — 97110 THERAPEUTIC EXERCISES: CPT

## 2022-07-14 PROCEDURE — 94664 DEMO&/EVAL PT USE INHALER: CPT

## 2022-07-14 PROCEDURE — 94669 MECHANICAL CHEST WALL OSCILL: CPT

## 2022-07-14 PROCEDURE — 80069 RENAL FUNCTION PANEL: CPT | Performed by: INTERNAL MEDICINE

## 2022-07-14 PROCEDURE — 25010000002 CEFTRIAXONE PER 250 MG: Performed by: INTERNAL MEDICINE

## 2022-07-14 PROCEDURE — 63710000001 PREDNISONE PER 5 MG: Performed by: INTERNAL MEDICINE

## 2022-07-14 RX ORDER — FUROSEMIDE 10 MG/ML
40 INJECTION INTRAMUSCULAR; INTRAVENOUS ONCE
Status: COMPLETED | OUTPATIENT
Start: 2022-07-14 | End: 2022-07-14

## 2022-07-14 RX ADMIN — Medication 1000 UNITS: at 09:07

## 2022-07-14 RX ADMIN — APIXABAN 2.5 MG: 2.5 TABLET, FILM COATED ORAL at 21:14

## 2022-07-14 RX ADMIN — FERROUS SULFATE TAB 325 MG (65 MG ELEMENTAL FE) 325 MG: 325 (65 FE) TAB at 09:08

## 2022-07-14 RX ADMIN — FUROSEMIDE 40 MG: 10 INJECTION, SOLUTION INTRAMUSCULAR; INTRAVENOUS at 11:54

## 2022-07-14 RX ADMIN — DOXYCYCLINE 100 MG: 100 INJECTION, POWDER, LYOPHILIZED, FOR SOLUTION INTRAVENOUS at 21:27

## 2022-07-14 RX ADMIN — Medication 1 CAPSULE: at 09:08

## 2022-07-14 RX ADMIN — CETIRIZINE HYDROCHLORIDE 5 MG: 10 TABLET ORAL at 09:07

## 2022-07-14 RX ADMIN — CEFTRIAXONE SODIUM 1 G: 1 INJECTION, POWDER, FOR SOLUTION INTRAMUSCULAR; INTRAVENOUS at 11:45

## 2022-07-14 RX ADMIN — METOPROLOL TARTRATE 25 MG: 25 TABLET, FILM COATED ORAL at 09:08

## 2022-07-14 RX ADMIN — ACETYLCYSTEINE 3 ML: 200 SOLUTION ORAL; RESPIRATORY (INHALATION) at 07:27

## 2022-07-14 RX ADMIN — GUAIFENESIN 600 MG: 600 TABLET, EXTENDED RELEASE ORAL at 09:08

## 2022-07-14 RX ADMIN — ACETAMINOPHEN 650 MG: 325 TABLET ORAL at 12:49

## 2022-07-14 RX ADMIN — DOXYCYCLINE 100 MG: 100 INJECTION, POWDER, LYOPHILIZED, FOR SOLUTION INTRAVENOUS at 09:07

## 2022-07-14 RX ADMIN — IPRATROPIUM BROMIDE AND ALBUTEROL SULFATE 3 ML: .5; 3 SOLUTION RESPIRATORY (INHALATION) at 07:27

## 2022-07-14 RX ADMIN — DEXTROMETHORPHAN 60 MG: 30 SUSPENSION, EXTENDED RELEASE ORAL at 21:15

## 2022-07-14 RX ADMIN — BUDESONIDE AND FORMOTEROL FUMARATE DIHYDRATE 2 PUFF: 160; 4.5 AEROSOL RESPIRATORY (INHALATION) at 07:27

## 2022-07-14 RX ADMIN — MULTIPLE VITAMINS W/ MINERALS TAB 1 TABLET: TAB at 09:08

## 2022-07-14 RX ADMIN — TORSEMIDE 40 MG: 20 TABLET ORAL at 09:07

## 2022-07-14 RX ADMIN — GUAIFENESIN 600 MG: 600 TABLET, EXTENDED RELEASE ORAL at 21:14

## 2022-07-14 RX ADMIN — IPRATROPIUM BROMIDE AND ALBUTEROL SULFATE 3 ML: .5; 3 SOLUTION RESPIRATORY (INHALATION) at 15:38

## 2022-07-14 RX ADMIN — DEXTROMETHORPHAN 60 MG: 30 SUSPENSION, EXTENDED RELEASE ORAL at 09:08

## 2022-07-14 RX ADMIN — ACETYLCYSTEINE 3 ML: 200 SOLUTION ORAL; RESPIRATORY (INHALATION) at 15:38

## 2022-07-14 RX ADMIN — VERAPAMIL HYDROCHLORIDE 360 MG: 180 TABLET, FILM COATED, EXTENDED RELEASE ORAL at 21:14

## 2022-07-14 RX ADMIN — POTASSIUM CHLORIDE 10 MEQ: 10 TABLET, EXTENDED RELEASE ORAL at 09:07

## 2022-07-14 RX ADMIN — BUDESONIDE AND FORMOTEROL FUMARATE DIHYDRATE 2 PUFF: 160; 4.5 AEROSOL RESPIRATORY (INHALATION) at 20:26

## 2022-07-14 RX ADMIN — ATORVASTATIN CALCIUM 40 MG: 20 TABLET, FILM COATED ORAL at 21:14

## 2022-07-14 RX ADMIN — PREDNISONE 10 MG: 10 TABLET ORAL at 09:08

## 2022-07-14 RX ADMIN — APIXABAN 2.5 MG: 2.5 TABLET, FILM COATED ORAL at 09:08

## 2022-07-14 RX ADMIN — LOSARTAN POTASSIUM 100 MG: 100 TABLET, FILM COATED ORAL at 09:07

## 2022-07-14 RX ADMIN — METOPROLOL TARTRATE 25 MG: 25 TABLET, FILM COATED ORAL at 21:15

## 2022-07-14 RX ADMIN — Medication 10 ML: at 09:09

## 2022-07-14 RX ADMIN — Medication 10 ML: at 21:15

## 2022-07-14 RX ADMIN — ACETYLCYSTEINE 3 ML: 200 SOLUTION ORAL; RESPIRATORY (INHALATION) at 11:07

## 2022-07-14 RX ADMIN — IPRATROPIUM BROMIDE AND ALBUTEROL SULFATE 3 ML: .5; 3 SOLUTION RESPIRATORY (INHALATION) at 11:07

## 2022-07-15 ENCOUNTER — APPOINTMENT (OUTPATIENT)
Dept: GENERAL RADIOLOGY | Facility: HOSPITAL | Age: 87
End: 2022-07-15

## 2022-07-15 ENCOUNTER — ANESTHESIA (OUTPATIENT)
Dept: GASTROENTEROLOGY | Facility: HOSPITAL | Age: 87
End: 2022-07-15

## 2022-07-15 ENCOUNTER — ANESTHESIA EVENT (OUTPATIENT)
Dept: GASTROENTEROLOGY | Facility: HOSPITAL | Age: 87
End: 2022-07-15

## 2022-07-15 LAB
ALBUMIN SERPL-MCNC: 3.3 G/DL (ref 3.5–5.2)
ANION GAP SERPL CALCULATED.3IONS-SCNC: 10 MMOL/L (ref 5–15)
APPEARANCE FLD: ABNORMAL
APPEARANCE FLD: ABNORMAL
BUN SERPL-MCNC: 20 MG/DL (ref 8–23)
BUN/CREAT SERPL: 26.7 (ref 7–25)
CALCIUM SPEC-SCNC: 9.2 MG/DL (ref 8.2–9.6)
CHLORIDE SERPL-SCNC: 102 MMOL/L (ref 98–107)
CO2 SERPL-SCNC: 28 MMOL/L (ref 22–29)
COLOR FLD: ABNORMAL
COLOR FLD: ABNORMAL
CREAT SERPL-MCNC: 0.75 MG/DL (ref 0.57–1)
DEPRECATED RDW RBC AUTO: 44.2 FL (ref 37–54)
EGFRCR SERPLBLD CKD-EPI 2021: 75.3 ML/MIN/1.73
ERYTHROCYTE [DISTWIDTH] IN BLOOD BY AUTOMATED COUNT: 13.1 % (ref 12.3–15.4)
GLUCOSE SERPL-MCNC: 103 MG/DL (ref 65–99)
HCT VFR BLD AUTO: 29.3 % (ref 34–46.6)
HGB BLD-MCNC: 10 G/DL (ref 12–15.9)
MCH RBC QN AUTO: 31.6 PG (ref 26.6–33)
MCHC RBC AUTO-ENTMCNC: 34.1 G/DL (ref 31.5–35.7)
MCV RBC AUTO: 92.7 FL (ref 79–97)
METHOD: ABNORMAL
METHOD: ABNORMAL
MONOS+MACROS NFR FLD: 11 %
NEUTROPHILS NFR FLD MANUAL: 89 %
NUC CELL # FLD: 3738 /MM3
NUC CELL # FLD: 3900 /MM3
PHOSPHATE SERPL-MCNC: 2.8 MG/DL (ref 2.5–4.5)
PLATELET # BLD AUTO: 349 10*3/MM3 (ref 140–450)
PMV BLD AUTO: 10 FL (ref 6–12)
POTASSIUM SERPL-SCNC: 3.4 MMOL/L (ref 3.5–5.2)
PROCALCITONIN SERPL-MCNC: 0.12 NG/ML (ref 0–0.25)
RBC # BLD AUTO: 3.16 10*6/MM3 (ref 3.77–5.28)
RBC # FLD AUTO: ABNORMAL /MM3
RBC # FLD AUTO: ABNORMAL /MM3
SARS-COV-2 RNA RESP QL NAA+PROBE: NOT DETECTED
SODIUM SERPL-SCNC: 140 MMOL/L (ref 136–145)
WBC NRBC COR # BLD: 12.78 10*3/MM3 (ref 3.4–10.8)

## 2022-07-15 PROCEDURE — 25010000002 PHENYLEPHRINE 10 MG/ML SOLUTION: Performed by: STUDENT IN AN ORGANIZED HEALTH CARE EDUCATION/TRAINING PROGRAM

## 2022-07-15 PROCEDURE — 94664 DEMO&/EVAL PT USE INHALER: CPT

## 2022-07-15 PROCEDURE — 87070 CULTURE OTHR SPECIMN AEROBIC: CPT | Performed by: INTERNAL MEDICINE

## 2022-07-15 PROCEDURE — 94799 UNLISTED PULMONARY SVC/PX: CPT

## 2022-07-15 PROCEDURE — 25010000002 ONDANSETRON PER 1 MG: Performed by: STUDENT IN AN ORGANIZED HEALTH CARE EDUCATION/TRAINING PROGRAM

## 2022-07-15 PROCEDURE — 87102 FUNGUS ISOLATION CULTURE: CPT | Performed by: INTERNAL MEDICINE

## 2022-07-15 PROCEDURE — 63710000001 PREDNISONE PER 5 MG: Performed by: INTERNAL MEDICINE

## 2022-07-15 PROCEDURE — 87186 SC STD MICRODIL/AGAR DIL: CPT | Performed by: INTERNAL MEDICINE

## 2022-07-15 PROCEDURE — 88112 CYTOPATH CELL ENHANCE TECH: CPT | Performed by: INTERNAL MEDICINE

## 2022-07-15 PROCEDURE — U0005 INFEC AGEN DETEC AMPLI PROBE: HCPCS | Performed by: INTERNAL MEDICINE

## 2022-07-15 PROCEDURE — 85027 COMPLETE CBC AUTOMATED: CPT | Performed by: INTERNAL MEDICINE

## 2022-07-15 PROCEDURE — 89051 BODY FLUID CELL COUNT: CPT | Performed by: INTERNAL MEDICINE

## 2022-07-15 PROCEDURE — 0B9K8ZX DRAINAGE OF RIGHT LUNG, VIA NATURAL OR ARTIFICIAL OPENING ENDOSCOPIC, DIAGNOSTIC: ICD-10-PCS | Performed by: INTERNAL MEDICINE

## 2022-07-15 PROCEDURE — 87186 SC STD MICRODIL/AGAR DIL: CPT

## 2022-07-15 PROCEDURE — 87071 CULTURE AEROBIC QUANT OTHER: CPT | Performed by: INTERNAL MEDICINE

## 2022-07-15 PROCEDURE — 87206 SMEAR FLUORESCENT/ACID STAI: CPT | Performed by: INTERNAL MEDICINE

## 2022-07-15 PROCEDURE — 87116 MYCOBACTERIA CULTURE: CPT | Performed by: INTERNAL MEDICINE

## 2022-07-15 PROCEDURE — 88305 TISSUE EXAM BY PATHOLOGIST: CPT | Performed by: INTERNAL MEDICINE

## 2022-07-15 PROCEDURE — 84145 PROCALCITONIN (PCT): CPT | Performed by: STUDENT IN AN ORGANIZED HEALTH CARE EDUCATION/TRAINING PROGRAM

## 2022-07-15 PROCEDURE — 25010000002 PROPOFOL 10 MG/ML EMULSION: Performed by: STUDENT IN AN ORGANIZED HEALTH CARE EDUCATION/TRAINING PROGRAM

## 2022-07-15 PROCEDURE — 80069 RENAL FUNCTION PANEL: CPT | Performed by: INTERNAL MEDICINE

## 2022-07-15 PROCEDURE — U0003 INFECTIOUS AGENT DETECTION BY NUCLEIC ACID (DNA OR RNA); SEVERE ACUTE RESPIRATORY SYNDROME CORONAVIRUS 2 (SARS-COV-2) (CORONAVIRUS DISEASE [COVID-19]), AMPLIFIED PROBE TECHNIQUE, MAKING USE OF HIGH THROUGHPUT TECHNOLOGIES AS DESCRIBED BY CMS-2020-01-R: HCPCS | Performed by: INTERNAL MEDICINE

## 2022-07-15 PROCEDURE — 87205 SMEAR GRAM STAIN: CPT | Performed by: INTERNAL MEDICINE

## 2022-07-15 PROCEDURE — 71046 X-RAY EXAM CHEST 2 VIEWS: CPT

## 2022-07-15 PROCEDURE — 94761 N-INVAS EAR/PLS OXIMETRY MLT: CPT

## 2022-07-15 PROCEDURE — 97530 THERAPEUTIC ACTIVITIES: CPT

## 2022-07-15 RX ORDER — PROPOFOL 10 MG/ML
VIAL (ML) INTRAVENOUS AS NEEDED
Status: DISCONTINUED | OUTPATIENT
Start: 2022-07-15 | End: 2022-07-15 | Stop reason: SURG

## 2022-07-15 RX ORDER — LIDOCAINE HYDROCHLORIDE 10 MG/ML
INJECTION, SOLUTION EPIDURAL; INFILTRATION; INTRACAUDAL; PERINEURAL AS NEEDED
Status: DISCONTINUED | OUTPATIENT
Start: 2022-07-15 | End: 2022-07-15 | Stop reason: HOSPADM

## 2022-07-15 RX ORDER — SODIUM CHLORIDE 9 MG/ML
30 INJECTION, SOLUTION INTRAVENOUS CONTINUOUS PRN
Status: DISCONTINUED | OUTPATIENT
Start: 2022-07-15 | End: 2022-07-19 | Stop reason: HOSPADM

## 2022-07-15 RX ORDER — LIDOCAINE HYDROCHLORIDE 20 MG/ML
INJECTION, SOLUTION EPIDURAL; INFILTRATION; INTRACAUDAL; PERINEURAL AS NEEDED
Status: DISCONTINUED | OUTPATIENT
Start: 2022-07-15 | End: 2022-07-15 | Stop reason: HOSPADM

## 2022-07-15 RX ORDER — PHENYLEPHRINE HYDROCHLORIDE 10 MG/ML
INJECTION INTRAVENOUS AS NEEDED
Status: DISCONTINUED | OUTPATIENT
Start: 2022-07-15 | End: 2022-07-15 | Stop reason: SURG

## 2022-07-15 RX ORDER — ONDANSETRON 2 MG/ML
4 INJECTION INTRAMUSCULAR; INTRAVENOUS ONCE
Status: COMPLETED | OUTPATIENT
Start: 2022-07-15 | End: 2022-07-15

## 2022-07-15 RX ORDER — LIDOCAINE HYDROCHLORIDE 20 MG/ML
SOLUTION OROPHARYNGEAL AS NEEDED
Status: DISCONTINUED | OUTPATIENT
Start: 2022-07-15 | End: 2022-07-15 | Stop reason: HOSPADM

## 2022-07-15 RX ORDER — FLUCONAZOLE 200 MG/1
400 TABLET ORAL EVERY 24 HOURS
Status: DISCONTINUED | OUTPATIENT
Start: 2022-07-15 | End: 2022-07-19 | Stop reason: HOSPADM

## 2022-07-15 RX ORDER — LIDOCAINE HYDROCHLORIDE 20 MG/ML
INJECTION, SOLUTION INFILTRATION; PERINEURAL AS NEEDED
Status: DISCONTINUED | OUTPATIENT
Start: 2022-07-15 | End: 2022-07-15 | Stop reason: SURG

## 2022-07-15 RX ADMIN — FLUCONAZOLE 400 MG: 200 TABLET ORAL at 23:14

## 2022-07-15 RX ADMIN — DEXTROMETHORPHAN 60 MG: 30 SUSPENSION, EXTENDED RELEASE ORAL at 20:08

## 2022-07-15 RX ADMIN — POTASSIUM CHLORIDE 40 MEQ: 10 TABLET, EXTENDED RELEASE ORAL at 20:05

## 2022-07-15 RX ADMIN — PHENYLEPHRINE HYDROCHLORIDE 200 MCG: 10 INJECTION INTRAVENOUS at 16:57

## 2022-07-15 RX ADMIN — ACETAMINOPHEN 650 MG: 325 TABLET ORAL at 20:02

## 2022-07-15 RX ADMIN — IPRATROPIUM BROMIDE AND ALBUTEROL SULFATE 3 ML: .5; 3 SOLUTION RESPIRATORY (INHALATION) at 10:42

## 2022-07-15 RX ADMIN — PHENYLEPHRINE HYDROCHLORIDE 100 MCG: 10 INJECTION INTRAVENOUS at 16:49

## 2022-07-15 RX ADMIN — LOSARTAN POTASSIUM 100 MG: 100 TABLET, FILM COATED ORAL at 12:21

## 2022-07-15 RX ADMIN — BUDESONIDE AND FORMOTEROL FUMARATE DIHYDRATE 2 PUFF: 160; 4.5 AEROSOL RESPIRATORY (INHALATION) at 06:38

## 2022-07-15 RX ADMIN — Medication 10 ML: at 20:19

## 2022-07-15 RX ADMIN — ONDANSETRON 4 MG: 2 INJECTION INTRAMUSCULAR; INTRAVENOUS at 17:36

## 2022-07-15 RX ADMIN — SODIUM CHLORIDE 30 ML/HR: 9 INJECTION, SOLUTION INTRAVENOUS at 16:06

## 2022-07-15 RX ADMIN — METOPROLOL TARTRATE 25 MG: 25 TABLET, FILM COATED ORAL at 12:19

## 2022-07-15 RX ADMIN — LIDOCAINE HYDROCHLORIDE 60 MG: 20 INJECTION, SOLUTION INFILTRATION; PERINEURAL at 16:38

## 2022-07-15 RX ADMIN — ATORVASTATIN CALCIUM 40 MG: 20 TABLET, FILM COATED ORAL at 20:05

## 2022-07-15 RX ADMIN — POTASSIUM CHLORIDE 40 MEQ: 10 TABLET, EXTENDED RELEASE ORAL at 12:26

## 2022-07-15 RX ADMIN — Medication 3 MG: at 20:04

## 2022-07-15 RX ADMIN — GUAIFENESIN 600 MG: 600 TABLET, EXTENDED RELEASE ORAL at 20:08

## 2022-07-15 RX ADMIN — BUDESONIDE AND FORMOTEROL FUMARATE DIHYDRATE 2 PUFF: 160; 4.5 AEROSOL RESPIRATORY (INHALATION) at 21:20

## 2022-07-15 RX ADMIN — PHENYLEPHRINE HYDROCHLORIDE 100 MCG: 10 INJECTION INTRAVENOUS at 16:45

## 2022-07-15 RX ADMIN — PROPOFOL 160 MCG/KG/MIN: 10 INJECTION, EMULSION INTRAVENOUS at 16:39

## 2022-07-15 RX ADMIN — PREDNISONE 10 MG: 10 TABLET ORAL at 12:21

## 2022-07-15 RX ADMIN — FLUTICASONE PROPIONATE 2 SPRAY: 50 SPRAY, METERED NASAL at 20:09

## 2022-07-15 RX ADMIN — TORSEMIDE 40 MG: 20 TABLET ORAL at 12:22

## 2022-07-15 RX ADMIN — PHENYLEPHRINE HYDROCHLORIDE 100 MCG: 10 INJECTION INTRAVENOUS at 16:53

## 2022-07-15 RX ADMIN — ACETYLCYSTEINE 3 ML: 200 SOLUTION ORAL; RESPIRATORY (INHALATION) at 10:42

## 2022-07-15 RX ADMIN — BENZONATATE 200 MG: 100 CAPSULE ORAL at 20:02

## 2022-07-15 RX ADMIN — SODIUM CHLORIDE 500 ML: 9 INJECTION, SOLUTION INTRAVENOUS at 20:22

## 2022-07-15 RX ADMIN — POTASSIUM CHLORIDE 10 MEQ: 10 TABLET, EXTENDED RELEASE ORAL at 12:21

## 2022-07-15 RX ADMIN — PROPOFOL 120 MG: 10 INJECTION, EMULSION INTRAVENOUS at 16:38

## 2022-07-15 RX ADMIN — PHENYLEPHRINE HYDROCHLORIDE 100 MCG: 10 INJECTION INTRAVENOUS at 16:38

## 2022-07-15 RX ADMIN — Medication 10 ML: at 12:26

## 2022-07-15 NOTE — ANESTHESIA PREPROCEDURE EVALUATION
Anesthesia Evaluation     Patient summary reviewed and Nursing notes reviewed   history of anesthetic complications: malignant hyperthermia  NPO Solid Status: > 8 hours  NPO Liquid Status: > 2 hours           Airway   Mallampati: II  TM distance: >3 FB  Neck ROM: full  Dental      Pulmonary    (+) pneumonia , asthma,    ROS comment: Bronchiectasis  Cardiovascular     ECG reviewed    (+) hypertension, valvular problems/murmurs TI and MR, CAD, dysrhythmias Atrial Fib, CHF Diastolic >=55%, hyperlipidemia,     ROS comment: Echo reviewed  Patient in Afib with RVR    Neuro/Psych  GI/Hepatic/Renal/Endo      Musculoskeletal     Abdominal    Substance History      OB/GYN          Other                      Anesthesia Plan    ASA 4     general     (Discussed DNR code status with patient. Will suspend perioperatively. Patient understands and agrees with plan. )  intravenous induction     Anesthetic plan, risks, benefits, and alternatives have been provided, discussed and informed consent has been obtained with: patient.        CODE STATUS:    Code Status (Patient has no pulse and is not breathing): CPR (Attempt to Resuscitate)  Medical Interventions (Patient has pulse or is breathing): Full Support

## 2022-07-15 NOTE — ANESTHESIA PROCEDURE NOTES
Airway  Urgency: elective    Date/Time: 7/15/2022 4:44 PM  Airway not difficult    General Information and Staff    Patient location during procedure: OR  Anesthesiologist: Segun Lund MD    Indications and Patient Condition  Indications for airway management: airway protection    Preoxygenated: yes  MILS maintained throughout  Mask difficulty assessment: 0 - not attempted    Final Airway Details  Final airway type: supraglottic airway      Successful airway: LMA  Size 3    Number of attempts at approach: 2  Assessment: lips, teeth, and gum same as pre-op and atraumatic intubation    Additional Comments  Attempt to place size 3 bronch LMA, unable to pass due to small mouth opening. Instead used standard LMA 3 and able to place successfully

## 2022-07-15 NOTE — ANESTHESIA POSTPROCEDURE EVALUATION
Patient: Agnieszka Rizzo    Procedure Summary     Date: 07/15/22 Room / Location:  [facility]    Anesthesia Start: 1630 Anesthesia Stop: 1715    Procedure: BRONCHOSCOPY (N/A Bronchus) Diagnosis:       Bronchiectasis with acute exacerbation (HCC)      (Bronchiectasis with acute exacerbation (HCC) [J47.1])    Surgeons: Rogelio Tucker MD Provider: Segun Lund MD    Anesthesia Type: general ASA Status: 4          Anesthesia Type: general    Vitals  Vitals Value Taken Time   /68 07/15/22 1711   Temp     Pulse 119 07/15/22 1711   Resp 22 07/15/22 1711   SpO2 95 % 07/15/22 1711           Post Anesthesia Care and Evaluation    Patient location during evaluation: bedside  Patient participation: complete - patient participated  Level of consciousness: awake and alert  Pain management: adequate    Airway patency: patent  Anesthetic complications: No anesthetic complications  PONV Status: controlled  Cardiovascular status: blood pressure returned to baseline and acceptable  Respiratory status: acceptable  Hydration status: acceptable

## 2022-07-16 LAB
ALBUMIN SERPL-MCNC: 3 G/DL (ref 3.5–5.2)
ANION GAP SERPL CALCULATED.3IONS-SCNC: 9 MMOL/L (ref 5–15)
BUN SERPL-MCNC: 22 MG/DL (ref 8–23)
BUN/CREAT SERPL: 28.2 (ref 7–25)
CALCIUM SPEC-SCNC: 9.2 MG/DL (ref 8.2–9.6)
CHLORIDE SERPL-SCNC: 105 MMOL/L (ref 98–107)
CO2 SERPL-SCNC: 27 MMOL/L (ref 22–29)
CREAT SERPL-MCNC: 0.78 MG/DL (ref 0.57–1)
DEPRECATED RDW RBC AUTO: 44 FL (ref 37–54)
EGFRCR SERPLBLD CKD-EPI 2021: 71.8 ML/MIN/1.73
ERYTHROCYTE [DISTWIDTH] IN BLOOD BY AUTOMATED COUNT: 13.2 % (ref 12.3–15.4)
GLUCOSE SERPL-MCNC: 101 MG/DL (ref 65–99)
HCT VFR BLD AUTO: 29.2 % (ref 34–46.6)
HGB BLD-MCNC: 9.5 G/DL (ref 12–15.9)
MCH RBC QN AUTO: 30.4 PG (ref 26.6–33)
MCHC RBC AUTO-ENTMCNC: 32.5 G/DL (ref 31.5–35.7)
MCV RBC AUTO: 93.6 FL (ref 79–97)
NEUTROPHILS NFR FLD MANUAL: 100 %
PHOSPHATE SERPL-MCNC: 3.5 MG/DL (ref 2.5–4.5)
PLATELET # BLD AUTO: 398 10*3/MM3 (ref 140–450)
PMV BLD AUTO: 9.6 FL (ref 6–12)
POTASSIUM SERPL-SCNC: 4.4 MMOL/L (ref 3.5–5.2)
QT INTERVAL: 269 MS
RBC # BLD AUTO: 3.12 10*6/MM3 (ref 3.77–5.28)
SODIUM SERPL-SCNC: 141 MMOL/L (ref 136–145)
WBC NRBC COR # BLD: 18.25 10*3/MM3 (ref 3.4–10.8)

## 2022-07-16 PROCEDURE — 80069 RENAL FUNCTION PANEL: CPT | Performed by: INTERNAL MEDICINE

## 2022-07-16 PROCEDURE — 85027 COMPLETE CBC AUTOMATED: CPT | Performed by: INTERNAL MEDICINE

## 2022-07-16 PROCEDURE — 25010000002 DIGOXIN PER 500 MCG: Performed by: INTERNAL MEDICINE

## 2022-07-16 PROCEDURE — 94799 UNLISTED PULMONARY SVC/PX: CPT

## 2022-07-16 PROCEDURE — 93005 ELECTROCARDIOGRAM TRACING: CPT | Performed by: STUDENT IN AN ORGANIZED HEALTH CARE EDUCATION/TRAINING PROGRAM

## 2022-07-16 PROCEDURE — 94761 N-INVAS EAR/PLS OXIMETRY MLT: CPT

## 2022-07-16 PROCEDURE — 92610 EVALUATE SWALLOWING FUNCTION: CPT

## 2022-07-16 PROCEDURE — 93010 ELECTROCARDIOGRAM REPORT: CPT | Performed by: INTERNAL MEDICINE

## 2022-07-16 PROCEDURE — 63710000001 PREDNISONE PER 5 MG: Performed by: INTERNAL MEDICINE

## 2022-07-16 PROCEDURE — 94664 DEMO&/EVAL PT USE INHALER: CPT

## 2022-07-16 PROCEDURE — 99222 1ST HOSP IP/OBS MODERATE 55: CPT | Performed by: INTERNAL MEDICINE

## 2022-07-16 RX ORDER — DIGOXIN 0.25 MG/ML
500 INJECTION INTRAMUSCULAR; INTRAVENOUS ONCE
Status: COMPLETED | OUTPATIENT
Start: 2022-07-16 | End: 2022-07-16

## 2022-07-16 RX ADMIN — ATORVASTATIN CALCIUM 40 MG: 20 TABLET, FILM COATED ORAL at 20:43

## 2022-07-16 RX ADMIN — TORSEMIDE 40 MG: 20 TABLET ORAL at 08:12

## 2022-07-16 RX ADMIN — IPRATROPIUM BROMIDE AND ALBUTEROL SULFATE 3 ML: .5; 3 SOLUTION RESPIRATORY (INHALATION) at 10:42

## 2022-07-16 RX ADMIN — APIXABAN 2.5 MG: 2.5 TABLET, FILM COATED ORAL at 20:43

## 2022-07-16 RX ADMIN — ACETAMINOPHEN 650 MG: 325 TABLET ORAL at 20:43

## 2022-07-16 RX ADMIN — ACETYLCYSTEINE 3 ML: 200 SOLUTION ORAL; RESPIRATORY (INHALATION) at 10:41

## 2022-07-16 RX ADMIN — APIXABAN 2.5 MG: 2.5 TABLET, FILM COATED ORAL at 08:13

## 2022-07-16 RX ADMIN — Medication 10 ML: at 20:46

## 2022-07-16 RX ADMIN — GUAIFENESIN 600 MG: 600 TABLET, EXTENDED RELEASE ORAL at 20:45

## 2022-07-16 RX ADMIN — FLUTICASONE PROPIONATE 2 SPRAY: 50 SPRAY, METERED NASAL at 20:45

## 2022-07-16 RX ADMIN — LOSARTAN POTASSIUM 100 MG: 100 TABLET, FILM COATED ORAL at 08:12

## 2022-07-16 RX ADMIN — FERROUS SULFATE TAB 325 MG (65 MG ELEMENTAL FE) 325 MG: 325 (65 FE) TAB at 08:13

## 2022-07-16 RX ADMIN — Medication 3 MG: at 20:44

## 2022-07-16 RX ADMIN — BUDESONIDE AND FORMOTEROL FUMARATE DIHYDRATE 2 PUFF: 160; 4.5 AEROSOL RESPIRATORY (INHALATION) at 19:02

## 2022-07-16 RX ADMIN — PREDNISONE 10 MG: 10 TABLET ORAL at 08:12

## 2022-07-16 RX ADMIN — METOPROLOL TARTRATE 25 MG: 25 TABLET, FILM COATED ORAL at 20:44

## 2022-07-16 RX ADMIN — DIGOXIN 500 MCG: 250 INJECTION, SOLUTION INTRAMUSCULAR; INTRAVENOUS at 23:54

## 2022-07-16 RX ADMIN — Medication 1000 UNITS: at 08:12

## 2022-07-16 RX ADMIN — GUAIFENESIN 600 MG: 600 TABLET, EXTENDED RELEASE ORAL at 08:13

## 2022-07-16 RX ADMIN — DIGOXIN 500 MCG: 250 INJECTION, SOLUTION INTRAMUSCULAR; INTRAVENOUS at 17:15

## 2022-07-16 RX ADMIN — BENZONATATE 200 MG: 100 CAPSULE ORAL at 18:30

## 2022-07-16 RX ADMIN — DEXTROMETHORPHAN 60 MG: 30 SUSPENSION, EXTENDED RELEASE ORAL at 08:13

## 2022-07-16 RX ADMIN — METOPROLOL TARTRATE 25 MG: 25 TABLET, FILM COATED ORAL at 09:50

## 2022-07-16 RX ADMIN — DEXTROMETHORPHAN 60 MG: 30 SUSPENSION, EXTENDED RELEASE ORAL at 20:45

## 2022-07-16 RX ADMIN — METOPROLOL TARTRATE 2.5 MG: 1 INJECTION, SOLUTION INTRAVENOUS at 11:24

## 2022-07-16 RX ADMIN — MULTIPLE VITAMINS W/ MINERALS TAB 1 TABLET: TAB at 08:12

## 2022-07-16 RX ADMIN — CETIRIZINE HYDROCHLORIDE 5 MG: 10 TABLET ORAL at 08:13

## 2022-07-16 RX ADMIN — METOPROLOL TARTRATE 12.5 MG: 25 TABLET, FILM COATED ORAL at 19:26

## 2022-07-16 RX ADMIN — BUDESONIDE AND FORMOTEROL FUMARATE DIHYDRATE 2 PUFF: 160; 4.5 AEROSOL RESPIRATORY (INHALATION) at 06:54

## 2022-07-16 RX ADMIN — Medication 1 CAPSULE: at 08:13

## 2022-07-16 RX ADMIN — FLUCONAZOLE 400 MG: 200 TABLET ORAL at 20:44

## 2022-07-16 RX ADMIN — POTASSIUM CHLORIDE 10 MEQ: 10 TABLET, EXTENDED RELEASE ORAL at 08:13

## 2022-07-16 RX ADMIN — Medication 10 ML: at 08:27

## 2022-07-17 LAB
ALBUMIN SERPL-MCNC: 3.1 G/DL (ref 3.5–5.2)
ANION GAP SERPL CALCULATED.3IONS-SCNC: 12 MMOL/L (ref 5–15)
BACTERIA SPEC RESP CULT: NORMAL
BUN SERPL-MCNC: 25 MG/DL (ref 8–23)
BUN/CREAT SERPL: 28.1 (ref 7–25)
CALCIUM SPEC-SCNC: 9.1 MG/DL (ref 8.2–9.6)
CHLORIDE SERPL-SCNC: 100 MMOL/L (ref 98–107)
CO2 SERPL-SCNC: 29 MMOL/L (ref 22–29)
CREAT SERPL-MCNC: 0.89 MG/DL (ref 0.57–1)
DEPRECATED RDW RBC AUTO: 45.1 FL (ref 37–54)
EGFRCR SERPLBLD CKD-EPI 2021: 61.3 ML/MIN/1.73
ERYTHROCYTE [DISTWIDTH] IN BLOOD BY AUTOMATED COUNT: 13.2 % (ref 12.3–15.4)
GLUCOSE SERPL-MCNC: 90 MG/DL (ref 65–99)
GRAM STN SPEC: NORMAL
HCT VFR BLD AUTO: 31.6 % (ref 34–46.6)
HGB BLD-MCNC: 10.4 G/DL (ref 12–15.9)
MCH RBC QN AUTO: 30.7 PG (ref 26.6–33)
MCHC RBC AUTO-ENTMCNC: 32.9 G/DL (ref 31.5–35.7)
MCV RBC AUTO: 93.2 FL (ref 79–97)
PHOSPHATE SERPL-MCNC: 3.5 MG/DL (ref 2.5–4.5)
PLATELET # BLD AUTO: 387 10*3/MM3 (ref 140–450)
PMV BLD AUTO: 9.7 FL (ref 6–12)
POTASSIUM SERPL-SCNC: 3.9 MMOL/L (ref 3.5–5.2)
RBC # BLD AUTO: 3.39 10*6/MM3 (ref 3.77–5.28)
SODIUM SERPL-SCNC: 141 MMOL/L (ref 136–145)
WBC NRBC COR # BLD: 14.56 10*3/MM3 (ref 3.4–10.8)

## 2022-07-17 PROCEDURE — 25010000002 CEFTRIAXONE PER 250 MG: Performed by: INTERNAL MEDICINE

## 2022-07-17 PROCEDURE — 94761 N-INVAS EAR/PLS OXIMETRY MLT: CPT

## 2022-07-17 PROCEDURE — 94664 DEMO&/EVAL PT USE INHALER: CPT

## 2022-07-17 PROCEDURE — 94799 UNLISTED PULMONARY SVC/PX: CPT

## 2022-07-17 PROCEDURE — 80069 RENAL FUNCTION PANEL: CPT | Performed by: INTERNAL MEDICINE

## 2022-07-17 PROCEDURE — 99232 SBSQ HOSP IP/OBS MODERATE 35: CPT | Performed by: INTERNAL MEDICINE

## 2022-07-17 PROCEDURE — 63710000001 PREDNISONE PER 5 MG: Performed by: INTERNAL MEDICINE

## 2022-07-17 PROCEDURE — 94760 N-INVAS EAR/PLS OXIMETRY 1: CPT

## 2022-07-17 PROCEDURE — 85027 COMPLETE CBC AUTOMATED: CPT | Performed by: INTERNAL MEDICINE

## 2022-07-17 RX ADMIN — FLUTICASONE PROPIONATE 2 SPRAY: 50 SPRAY, METERED NASAL at 19:35

## 2022-07-17 RX ADMIN — Medication 10 ML: at 09:21

## 2022-07-17 RX ADMIN — ACETYLCYSTEINE 3 ML: 200 SOLUTION ORAL; RESPIRATORY (INHALATION) at 10:56

## 2022-07-17 RX ADMIN — Medication 10 ML: at 19:35

## 2022-07-17 RX ADMIN — LOSARTAN POTASSIUM 100 MG: 100 TABLET, FILM COATED ORAL at 09:20

## 2022-07-17 RX ADMIN — IPRATROPIUM BROMIDE AND ALBUTEROL SULFATE 3 ML: .5; 3 SOLUTION RESPIRATORY (INHALATION) at 15:32

## 2022-07-17 RX ADMIN — METOPROLOL TARTRATE 25 MG: 25 TABLET, FILM COATED ORAL at 09:20

## 2022-07-17 RX ADMIN — ACETYLCYSTEINE 3 ML: 200 SOLUTION ORAL; RESPIRATORY (INHALATION) at 15:32

## 2022-07-17 RX ADMIN — CEFTRIAXONE SODIUM 1 G: 1 INJECTION, POWDER, FOR SOLUTION INTRAMUSCULAR; INTRAVENOUS at 19:53

## 2022-07-17 RX ADMIN — DEXTROMETHORPHAN 60 MG: 30 SUSPENSION, EXTENDED RELEASE ORAL at 09:25

## 2022-07-17 RX ADMIN — FERROUS SULFATE TAB 325 MG (65 MG ELEMENTAL FE) 325 MG: 325 (65 FE) TAB at 09:19

## 2022-07-17 RX ADMIN — Medication 3 MG: at 19:52

## 2022-07-17 RX ADMIN — POTASSIUM CHLORIDE 10 MEQ: 10 TABLET, EXTENDED RELEASE ORAL at 09:20

## 2022-07-17 RX ADMIN — ATORVASTATIN CALCIUM 40 MG: 20 TABLET, FILM COATED ORAL at 19:53

## 2022-07-17 RX ADMIN — Medication 1000 UNITS: at 09:20

## 2022-07-17 RX ADMIN — BENZONATATE 200 MG: 100 CAPSULE ORAL at 19:55

## 2022-07-17 RX ADMIN — GUAIFENESIN 600 MG: 600 TABLET, EXTENDED RELEASE ORAL at 09:20

## 2022-07-17 RX ADMIN — BUDESONIDE AND FORMOTEROL FUMARATE DIHYDRATE 2 PUFF: 160; 4.5 AEROSOL RESPIRATORY (INHALATION) at 20:08

## 2022-07-17 RX ADMIN — IPRATROPIUM BROMIDE AND ALBUTEROL SULFATE 3 ML: .5; 3 SOLUTION RESPIRATORY (INHALATION) at 10:56

## 2022-07-17 RX ADMIN — BUDESONIDE AND FORMOTEROL FUMARATE DIHYDRATE 2 PUFF: 160; 4.5 AEROSOL RESPIRATORY (INHALATION) at 07:05

## 2022-07-17 RX ADMIN — Medication 1 CAPSULE: at 09:20

## 2022-07-17 RX ADMIN — FLUCONAZOLE 400 MG: 200 TABLET ORAL at 19:51

## 2022-07-17 RX ADMIN — GUAIFENESIN 600 MG: 600 TABLET, EXTENDED RELEASE ORAL at 19:52

## 2022-07-17 RX ADMIN — PREDNISONE 10 MG: 10 TABLET ORAL at 09:19

## 2022-07-17 RX ADMIN — APIXABAN 2.5 MG: 2.5 TABLET, FILM COATED ORAL at 19:53

## 2022-07-17 RX ADMIN — METOPROLOL TARTRATE 25 MG: 25 TABLET, FILM COATED ORAL at 19:53

## 2022-07-17 RX ADMIN — ACETAMINOPHEN 650 MG: 325 TABLET ORAL at 19:52

## 2022-07-17 RX ADMIN — MULTIPLE VITAMINS W/ MINERALS TAB 1 TABLET: TAB at 09:19

## 2022-07-17 RX ADMIN — DEXTROMETHORPHAN 60 MG: 30 SUSPENSION, EXTENDED RELEASE ORAL at 19:51

## 2022-07-17 RX ADMIN — CETIRIZINE HYDROCHLORIDE 5 MG: 10 TABLET ORAL at 09:19

## 2022-07-17 RX ADMIN — APIXABAN 2.5 MG: 2.5 TABLET, FILM COATED ORAL at 09:21

## 2022-07-18 LAB
ALBUMIN SERPL-MCNC: 3.3 G/DL (ref 3.5–5.2)
ANION GAP SERPL CALCULATED.3IONS-SCNC: 10 MMOL/L (ref 5–15)
BUN SERPL-MCNC: 16 MG/DL (ref 8–23)
BUN/CREAT SERPL: 26.7 (ref 7–25)
CALCIUM SPEC-SCNC: 9 MG/DL (ref 8.2–9.6)
CHLORIDE SERPL-SCNC: 99 MMOL/L (ref 98–107)
CO2 SERPL-SCNC: 28 MMOL/L (ref 22–29)
CREAT SERPL-MCNC: 0.6 MG/DL (ref 0.57–1)
DEPRECATED RDW RBC AUTO: 46.4 FL (ref 37–54)
EGFRCR SERPLBLD CKD-EPI 2021: 84.9 ML/MIN/1.73
ERYTHROCYTE [DISTWIDTH] IN BLOOD BY AUTOMATED COUNT: 13.2 % (ref 12.3–15.4)
GLUCOSE SERPL-MCNC: 92 MG/DL (ref 65–99)
HCT VFR BLD AUTO: 32 % (ref 34–46.6)
HGB BLD-MCNC: 10.6 G/DL (ref 12–15.9)
MCH RBC QN AUTO: 32 PG (ref 26.6–33)
MCHC RBC AUTO-ENTMCNC: 33.1 G/DL (ref 31.5–35.7)
MCV RBC AUTO: 96.7 FL (ref 79–97)
PHOSPHATE SERPL-MCNC: 2.8 MG/DL (ref 2.5–4.5)
PLATELET # BLD AUTO: 387 10*3/MM3 (ref 140–450)
PMV BLD AUTO: 9.5 FL (ref 6–12)
POTASSIUM SERPL-SCNC: 4.1 MMOL/L (ref 3.5–5.2)
PROCALCITONIN SERPL-MCNC: 0.05 NG/ML (ref 0–0.25)
RBC # BLD AUTO: 3.31 10*6/MM3 (ref 3.77–5.28)
SODIUM SERPL-SCNC: 137 MMOL/L (ref 136–145)
WBC NRBC COR # BLD: 21.4 10*3/MM3 (ref 3.4–10.8)

## 2022-07-18 PROCEDURE — 97530 THERAPEUTIC ACTIVITIES: CPT

## 2022-07-18 PROCEDURE — 94761 N-INVAS EAR/PLS OXIMETRY MLT: CPT

## 2022-07-18 PROCEDURE — 85027 COMPLETE CBC AUTOMATED: CPT | Performed by: INTERNAL MEDICINE

## 2022-07-18 PROCEDURE — 63710000001 PREDNISONE PER 5 MG: Performed by: INTERNAL MEDICINE

## 2022-07-18 PROCEDURE — 25010000002 ONDANSETRON PER 1 MG: Performed by: INTERNAL MEDICINE

## 2022-07-18 PROCEDURE — 80069 RENAL FUNCTION PANEL: CPT | Performed by: INTERNAL MEDICINE

## 2022-07-18 PROCEDURE — 25010000002 CEFTRIAXONE PER 250 MG: Performed by: INTERNAL MEDICINE

## 2022-07-18 PROCEDURE — 94799 UNLISTED PULMONARY SVC/PX: CPT

## 2022-07-18 PROCEDURE — 84145 PROCALCITONIN (PCT): CPT | Performed by: STUDENT IN AN ORGANIZED HEALTH CARE EDUCATION/TRAINING PROGRAM

## 2022-07-18 PROCEDURE — 94760 N-INVAS EAR/PLS OXIMETRY 1: CPT

## 2022-07-18 PROCEDURE — 99232 SBSQ HOSP IP/OBS MODERATE 35: CPT | Performed by: NURSE PRACTITIONER

## 2022-07-18 RX ORDER — TORSEMIDE 20 MG/1
20 TABLET ORAL DAILY
Status: DISCONTINUED | OUTPATIENT
Start: 2022-07-18 | End: 2022-07-19 | Stop reason: HOSPADM

## 2022-07-18 RX ORDER — POTASSIUM CHLORIDE 750 MG/1
10 TABLET, FILM COATED, EXTENDED RELEASE ORAL DAILY
Status: DISCONTINUED | OUTPATIENT
Start: 2022-07-18 | End: 2022-07-18 | Stop reason: SDUPTHER

## 2022-07-18 RX ADMIN — BUDESONIDE AND FORMOTEROL FUMARATE DIHYDRATE 2 PUFF: 160; 4.5 AEROSOL RESPIRATORY (INHALATION) at 20:43

## 2022-07-18 RX ADMIN — ACETYLCYSTEINE 3 ML: 200 SOLUTION ORAL; RESPIRATORY (INHALATION) at 11:42

## 2022-07-18 RX ADMIN — Medication 3 MG: at 20:24

## 2022-07-18 RX ADMIN — POTASSIUM CHLORIDE 10 MEQ: 10 TABLET, EXTENDED RELEASE ORAL at 08:42

## 2022-07-18 RX ADMIN — DEXTROMETHORPHAN 60 MG: 30 SUSPENSION, EXTENDED RELEASE ORAL at 20:21

## 2022-07-18 RX ADMIN — METOPROLOL TARTRATE 25 MG: 25 TABLET, FILM COATED ORAL at 20:21

## 2022-07-18 RX ADMIN — BENZONATATE 200 MG: 100 CAPSULE ORAL at 04:44

## 2022-07-18 RX ADMIN — Medication 1000 UNITS: at 08:41

## 2022-07-18 RX ADMIN — Medication 10 ML: at 08:43

## 2022-07-18 RX ADMIN — Medication 10 ML: at 20:30

## 2022-07-18 RX ADMIN — METOPROLOL TARTRATE 25 MG: 25 TABLET, FILM COATED ORAL at 08:42

## 2022-07-18 RX ADMIN — PREDNISONE 10 MG: 10 TABLET ORAL at 08:41

## 2022-07-18 RX ADMIN — CEFTRIAXONE SODIUM 1 G: 1 INJECTION, POWDER, FOR SOLUTION INTRAMUSCULAR; INTRAVENOUS at 20:21

## 2022-07-18 RX ADMIN — IPRATROPIUM BROMIDE AND ALBUTEROL SULFATE 3 ML: .5; 3 SOLUTION RESPIRATORY (INHALATION) at 11:41

## 2022-07-18 RX ADMIN — FERROUS SULFATE TAB 325 MG (65 MG ELEMENTAL FE) 325 MG: 325 (65 FE) TAB at 08:41

## 2022-07-18 RX ADMIN — APIXABAN 2.5 MG: 2.5 TABLET, FILM COATED ORAL at 08:42

## 2022-07-18 RX ADMIN — Medication 1 CAPSULE: at 08:41

## 2022-07-18 RX ADMIN — GUAIFENESIN 600 MG: 600 TABLET, EXTENDED RELEASE ORAL at 08:41

## 2022-07-18 RX ADMIN — IPRATROPIUM BROMIDE AND ALBUTEROL SULFATE 3 ML: .5; 3 SOLUTION RESPIRATORY (INHALATION) at 16:11

## 2022-07-18 RX ADMIN — ATORVASTATIN CALCIUM 40 MG: 20 TABLET, FILM COATED ORAL at 20:21

## 2022-07-18 RX ADMIN — GUAIFENESIN 600 MG: 600 TABLET, EXTENDED RELEASE ORAL at 20:21

## 2022-07-18 RX ADMIN — APIXABAN 2.5 MG: 2.5 TABLET, FILM COATED ORAL at 20:24

## 2022-07-18 RX ADMIN — BUDESONIDE AND FORMOTEROL FUMARATE DIHYDRATE 2 PUFF: 160; 4.5 AEROSOL RESPIRATORY (INHALATION) at 07:43

## 2022-07-18 RX ADMIN — ONDANSETRON 4 MG: 2 INJECTION INTRAMUSCULAR; INTRAVENOUS at 06:46

## 2022-07-18 RX ADMIN — TORSEMIDE 20 MG: 20 TABLET ORAL at 12:23

## 2022-07-18 RX ADMIN — LOSARTAN POTASSIUM 100 MG: 100 TABLET, FILM COATED ORAL at 08:41

## 2022-07-18 RX ADMIN — FLUCONAZOLE 400 MG: 200 TABLET ORAL at 20:21

## 2022-07-18 RX ADMIN — MULTIPLE VITAMINS W/ MINERALS TAB 1 TABLET: TAB at 08:41

## 2022-07-18 RX ADMIN — DEXTROMETHORPHAN 60 MG: 30 SUSPENSION, EXTENDED RELEASE ORAL at 08:41

## 2022-07-18 RX ADMIN — CETIRIZINE HYDROCHLORIDE 5 MG: 10 TABLET ORAL at 08:41

## 2022-07-19 ENCOUNTER — TRANSCRIBE ORDERS (OUTPATIENT)
Dept: HOME HEALTH SERVICES | Facility: HOME HEALTHCARE | Age: 87
End: 2022-07-19

## 2022-07-19 ENCOUNTER — READMISSION MANAGEMENT (OUTPATIENT)
Dept: CALL CENTER | Facility: HOSPITAL | Age: 87
End: 2022-07-19

## 2022-07-19 ENCOUNTER — HOME HEALTH ADMISSION (OUTPATIENT)
Dept: HOME HEALTH SERVICES | Facility: HOME HEALTHCARE | Age: 87
End: 2022-07-19

## 2022-07-19 VITALS
OXYGEN SATURATION: 96 % | RESPIRATION RATE: 22 BRPM | TEMPERATURE: 98 F | SYSTOLIC BLOOD PRESSURE: 128 MMHG | BODY MASS INDEX: 21.81 KG/M2 | WEIGHT: 118.5 LBS | HEIGHT: 62 IN | DIASTOLIC BLOOD PRESSURE: 72 MMHG | HEART RATE: 102 BPM

## 2022-07-19 DIAGNOSIS — J44.1 CHRONIC OBSTRUCTIVE PULMONARY DISEASE WITH ACUTE EXACERBATION: ICD-10-CM

## 2022-07-19 DIAGNOSIS — I50.9 ACUTE ON CHRONIC CONGESTIVE HEART FAILURE, UNSPECIFIED HEART FAILURE TYPE: ICD-10-CM

## 2022-07-19 DIAGNOSIS — J18.9 PNEUMONIA OF RIGHT LOWER LOBE DUE TO INFECTIOUS ORGANISM: Primary | ICD-10-CM

## 2022-07-19 LAB
ALBUMIN SERPL-MCNC: 3 G/DL (ref 3.5–5.2)
ANION GAP SERPL CALCULATED.3IONS-SCNC: 9 MMOL/L (ref 5–15)
BUN SERPL-MCNC: 14 MG/DL (ref 8–23)
BUN/CREAT SERPL: 21.5 (ref 7–25)
CALCIUM SPEC-SCNC: 8.9 MG/DL (ref 8.2–9.6)
CHLORIDE SERPL-SCNC: 99 MMOL/L (ref 98–107)
CO2 SERPL-SCNC: 30 MMOL/L (ref 22–29)
CREAT SERPL-MCNC: 0.65 MG/DL (ref 0.57–1)
CYTO UR: NORMAL
DEPRECATED RDW RBC AUTO: 45.7 FL (ref 37–54)
EGFRCR SERPLBLD CKD-EPI 2021: 83.2 ML/MIN/1.73
ERYTHROCYTE [DISTWIDTH] IN BLOOD BY AUTOMATED COUNT: 13.2 % (ref 12.3–15.4)
GLUCOSE SERPL-MCNC: 93 MG/DL (ref 65–99)
HCT VFR BLD AUTO: 29.5 % (ref 34–46.6)
HGB BLD-MCNC: 9.7 G/DL (ref 12–15.9)
LAB AP CASE REPORT: NORMAL
MCH RBC QN AUTO: 31.5 PG (ref 26.6–33)
MCHC RBC AUTO-ENTMCNC: 32.9 G/DL (ref 31.5–35.7)
MCV RBC AUTO: 95.8 FL (ref 79–97)
PATH REPORT.FINAL DX SPEC: NORMAL
PATH REPORT.GROSS SPEC: NORMAL
PHOSPHATE SERPL-MCNC: 3.4 MG/DL (ref 2.5–4.5)
PLATELET # BLD AUTO: 336 10*3/MM3 (ref 140–450)
PMV BLD AUTO: 10.5 FL (ref 6–12)
POTASSIUM SERPL-SCNC: 3.6 MMOL/L (ref 3.5–5.2)
RBC # BLD AUTO: 3.08 10*6/MM3 (ref 3.77–5.28)
SODIUM SERPL-SCNC: 138 MMOL/L (ref 136–145)
WBC NRBC COR # BLD: 15.18 10*3/MM3 (ref 3.4–10.8)

## 2022-07-19 PROCEDURE — 63710000001 PREDNISONE PER 5 MG: Performed by: INTERNAL MEDICINE

## 2022-07-19 PROCEDURE — 94664 DEMO&/EVAL PT USE INHALER: CPT

## 2022-07-19 PROCEDURE — 94761 N-INVAS EAR/PLS OXIMETRY MLT: CPT

## 2022-07-19 PROCEDURE — 94760 N-INVAS EAR/PLS OXIMETRY 1: CPT

## 2022-07-19 PROCEDURE — 94618 PULMONARY STRESS TESTING: CPT

## 2022-07-19 PROCEDURE — 80069 RENAL FUNCTION PANEL: CPT | Performed by: INTERNAL MEDICINE

## 2022-07-19 PROCEDURE — 85027 COMPLETE CBC AUTOMATED: CPT | Performed by: INTERNAL MEDICINE

## 2022-07-19 PROCEDURE — 99231 SBSQ HOSP IP/OBS SF/LOW 25: CPT | Performed by: NURSE PRACTITIONER

## 2022-07-19 PROCEDURE — 94799 UNLISTED PULMONARY SVC/PX: CPT

## 2022-07-19 RX ORDER — CEFDINIR 300 MG/1
300 CAPSULE ORAL 2 TIMES DAILY
Qty: 8 CAPSULE | Refills: 0 | Status: SHIPPED | OUTPATIENT
Start: 2022-07-19 | End: 2022-07-23

## 2022-07-19 RX ORDER — BENZONATATE 200 MG/1
200 CAPSULE ORAL 3 TIMES DAILY PRN
Qty: 60 CAPSULE | Refills: 0 | Status: SHIPPED | OUTPATIENT
Start: 2022-07-19 | End: 2023-04-03

## 2022-07-19 RX ORDER — FLUCONAZOLE 200 MG/1
400 TABLET ORAL EVERY 24 HOURS
Qty: 6 TABLET | Refills: 0 | Status: SHIPPED | OUTPATIENT
Start: 2022-07-19 | End: 2022-07-22

## 2022-07-19 RX ORDER — FLUCONAZOLE 200 MG/1
400 TABLET ORAL EVERY 24 HOURS
Qty: 6 TABLET | Refills: 0 | OUTPATIENT
Start: 2022-07-19 | End: 2022-07-22

## 2022-07-19 RX ORDER — GUAIFENESIN 600 MG/1
600 TABLET, EXTENDED RELEASE ORAL EVERY 12 HOURS SCHEDULED
Qty: 30 TABLET | Refills: 0 | Status: SHIPPED | OUTPATIENT
Start: 2022-07-19 | End: 2023-04-03

## 2022-07-19 RX ORDER — BENZONATATE 200 MG/1
200 CAPSULE ORAL 3 TIMES DAILY PRN
Qty: 90 CAPSULE | Refills: 0 | OUTPATIENT
Start: 2022-07-19

## 2022-07-19 RX ORDER — CEFDINIR 300 MG/1
300 CAPSULE ORAL 2 TIMES DAILY
Qty: 8 CAPSULE | Refills: 0 | OUTPATIENT
Start: 2022-07-19 | End: 2022-07-23

## 2022-07-19 RX ORDER — GUAIFENESIN 600 MG/1
600 TABLET, EXTENDED RELEASE ORAL EVERY 12 HOURS SCHEDULED
Qty: 28 TABLET | Refills: 0 | OUTPATIENT
Start: 2022-07-19

## 2022-07-19 RX ORDER — SODIUM CHLORIDE FOR INHALATION 7 %
4 VIAL, NEBULIZER (ML) INHALATION
Status: DISCONTINUED | OUTPATIENT
Start: 2022-07-19 | End: 2022-07-19 | Stop reason: HOSPADM

## 2022-07-19 RX ADMIN — LOSARTAN POTASSIUM 100 MG: 100 TABLET, FILM COATED ORAL at 09:47

## 2022-07-19 RX ADMIN — Medication 1 CAPSULE: at 09:47

## 2022-07-19 RX ADMIN — MULTIPLE VITAMINS W/ MINERALS TAB 1 TABLET: TAB at 09:47

## 2022-07-19 RX ADMIN — SODIUM CHLORIDE SOLN NEBU 7% 4 ML: 7 NEBU SOLN at 08:39

## 2022-07-19 RX ADMIN — TORSEMIDE 20 MG: 20 TABLET ORAL at 09:47

## 2022-07-19 RX ADMIN — IPRATROPIUM BROMIDE AND ALBUTEROL SULFATE 3 ML: .5; 3 SOLUTION RESPIRATORY (INHALATION) at 13:09

## 2022-07-19 RX ADMIN — METOPROLOL TARTRATE 25 MG: 25 TABLET, FILM COATED ORAL at 09:47

## 2022-07-19 RX ADMIN — POTASSIUM CHLORIDE 10 MEQ: 10 TABLET, EXTENDED RELEASE ORAL at 09:47

## 2022-07-19 RX ADMIN — GUAIFENESIN 600 MG: 600 TABLET, EXTENDED RELEASE ORAL at 09:47

## 2022-07-19 RX ADMIN — FERROUS SULFATE TAB 325 MG (65 MG ELEMENTAL FE) 325 MG: 325 (65 FE) TAB at 09:47

## 2022-07-19 RX ADMIN — Medication 1000 UNITS: at 09:47

## 2022-07-19 RX ADMIN — Medication 10 ML: at 09:49

## 2022-07-19 RX ADMIN — IPRATROPIUM BROMIDE AND ALBUTEROL SULFATE 3 ML: .5; 3 SOLUTION RESPIRATORY (INHALATION) at 08:38

## 2022-07-19 RX ADMIN — PREDNISONE 10 MG: 10 TABLET ORAL at 09:47

## 2022-07-19 RX ADMIN — APIXABAN 2.5 MG: 2.5 TABLET, FILM COATED ORAL at 09:47

## 2022-07-19 RX ADMIN — CETIRIZINE HYDROCHLORIDE 5 MG: 10 TABLET ORAL at 09:47

## 2022-07-19 NOTE — OUTREACH NOTE
Prep Survey    Flowsheet Row Responses   [facility] patient discharged from? [place]   Is LACE score < 7 ? No   Emergency Room discharge w/ pulse ox? No   Eligibility [facility]   Date of Admission 07/09/22   Date of Discharge 07/19/22   Discharge Disposition Home-Health Care Sv   Discharge diagnosis COPD-Pneumonia   Does the patient have one of the following disease processes/diagnoses(primary or secondary)? COPD/Pneumonia   Does the patient have Home health ordered? Yes   What is the Home health agency?   [facility]   Is there a DME ordered? Yes   What DME was ordered? [facility] - [facility]  for CONTINOUS 02 needs   Prep survey completed? Yes          ALISHA MILLIGAN - Registered Nurse

## 2022-07-20 ENCOUNTER — TELEPHONE (OUTPATIENT)
Dept: INTERNAL MEDICINE | Facility: CLINIC | Age: 87
End: 2022-07-20

## 2022-07-20 ENCOUNTER — TRANSITIONAL CARE MANAGEMENT TELEPHONE ENCOUNTER (OUTPATIENT)
Dept: CALL CENTER | Facility: HOSPITAL | Age: 87
End: 2022-07-20

## 2022-07-20 ENCOUNTER — HOME CARE VISIT (OUTPATIENT)
Dept: HOME HEALTH SERVICES | Facility: HOME HEALTHCARE | Age: 87
End: 2022-07-20

## 2022-07-20 PROCEDURE — G0299 HHS/HOSPICE OF RN EA 15 MIN: HCPCS

## 2022-07-20 NOTE — TELEPHONE ENCOUNTER
Caller: Michelle Zaldivar    Relationship to patient: Emergency Contact    Best call back number: [phone]    Patient is needing: PATIENT WAS RELEASED FROM [facility] YESTERDAY 7-19-22 AND THE PATIENTS DAUGHTER IS CALLING ASKING FOR DR. COTTER TO HELP THE PATIENT GET INTO A REHAB FACILITY IN [place] [address]. THE PATIENT HAS ALREADY FALLEN AT HOME AND THE HOME HEALTH CARE NURSE WILL BE THERE AT 11:00 BUT THE PATIENTS DAUGHTER WANTS TO GET THE PROCESS STARTED OF GETTING THE PATIENT INTO A REHAB.

## 2022-07-20 NOTE — HOME HEALTH
"PT HOME AFTER HOSPITALIZED [facility] FOR PNEUMONIA- HX COPD, CHF, MACUALR DEGEN- LEGALLY BLIND RT EYE    PT ALREADY DOES DAILY WEIGHT CHECKS AND TEACHES CALL PARAMETERS TO SN    PT W/ HX COPD- WAS ON O2 2L AT HS, NOW 3 LPM NC CONTINUOUS, PT HAS A PERCUSSION VEST SHE USES 2X/ DAY AS SHE TAKES HER NEBULIZER TXS    LIVES LIVES ALONE, DAUGHTER CURRENTLY STAYING WITH PT FOR \"A NIGHT OR 2\"  PT FELL THIS MORNING, DAUGHTER IS LOOKING TO PLACE PT IN REHAB IF ARRANGEMENTS CAN BE MADE,"

## 2022-07-20 NOTE — TELEPHONE ENCOUNTER
Spoke to patient's daughter and notified that Dr. Rose does not refer patient's to rehab.  She said that she spoke to them and was told that they can do an admission without Dr. Rose.  She expressed understanding and will call if she needs any information.

## 2022-07-20 NOTE — OUTREACH NOTE
Call Center TCM Note    Flowsheet Row Responses   [facility] patient discharged from? [place]   Does the patient have one of the following disease processes/diagnoses(primary or secondary)? COPD/Pneumonia   Was the primary reason for admission: Pneumonia   TCM attempt successful? No   Unsuccessful attempts Attempt 1          Amee Fernandez LPN    7/20/2022, 15:34 EDT

## 2022-07-20 NOTE — OUTREACH NOTE
Call Center TCM Note    Flowsheet Row Responses   [facility] patient discharged from? [place]   Does the patient have one of the following disease processes/diagnoses(primary or secondary)? COPD/Pneumonia   Was the primary reason for admission: Pneumonia   TCM attempt successful? No   Unsuccessful attempts Attempt 2          Amee Fernandez LPN    7/20/2022, 16:06 EDT

## 2022-07-20 NOTE — CASE COMMUNICATION
PT FELL THIS MORNING PRIOR TO [facility] VISIT, SHE HAS 2 X SKIN TEARS RUE. STERISPRIPS APPROXIMATED ONE TEAR. THE OTHER IS OPEN- COVERED W/ VASOLINE GAUZE, WRAPPED W/ KERLEX IF THAT IS OK?    IF SHE STAYS HOME AND DOESN'T GET IN REHAB AS DAUGHTER IS TRYING CAN I HAVE P.T. AND O.T SEE HER AT HOME?    ALYSA LOPEZ RN

## 2022-07-20 NOTE — TELEPHONE ENCOUNTER
PATIENTS DAUGHTER ZA IS CALLING IN STATING THAT THE [place] [facility] IS WHERE SHE WANTS THE PATIENT TO GO.  SHE SAYS SHE CALLED IN EARLIER TODAY AND DID NOT KNOW THE NAME OF THE FACILITY.     ZA CALL BACK  [phone]

## 2022-07-21 ENCOUNTER — TRANSITIONAL CARE MANAGEMENT TELEPHONE ENCOUNTER (OUTPATIENT)
Dept: CALL CENTER | Facility: HOSPITAL | Age: 87
End: 2022-07-21

## 2022-07-21 VITALS
WEIGHT: 118.56 LBS | OXYGEN SATURATION: 98 % | SYSTOLIC BLOOD PRESSURE: 110 MMHG | TEMPERATURE: 98 F | HEART RATE: 64 BPM | BODY MASS INDEX: 21.69 KG/M2 | DIASTOLIC BLOOD PRESSURE: 70 MMHG | RESPIRATION RATE: 20 BRPM

## 2022-07-21 NOTE — OUTREACH NOTE
Call Center TCM Note    Flowsheet Row Responses   [facility] patient discharged from? [place]   Does the patient have one of the following disease processes/diagnoses(primary or secondary)? COPD/Pneumonia   Was the primary reason for admission: Pneumonia   TCM attempt successful? No   Unsuccessful attempts Attempt 3   Does the patient have a primary care provider?  Yes   Does the patient have an appointment with their PCP or specialist within 7 days of discharge? Greater than 7 days   Comments regarding PCP hospital fu appt on 7/29/22 at 11:00 AM   What is preventing the patient from scheduling follow up appointments within 7 days of discharge? Earlier appointment not available   Nursing Interventions Verified appointment date/time/provider   Has the patient kept scheduled appointments due by today? N/A          eLna Dozier RN    7/21/2022, 15:39 EDT

## 2022-07-22 ENCOUNTER — HOME CARE VISIT (OUTPATIENT)
Dept: HOME HEALTH SERVICES | Facility: HOME HEALTHCARE | Age: 87
End: 2022-07-22

## 2022-07-22 NOTE — CASE COMMUNICATION
"PT HOME AFTER HOSPITALIZED [facility] FOR PNEUMONIA- HX COPD, CHF, MACUALR DEGEN- LEGALLY BLIND RT EYE    PT ALREADY DOES DAILY WEIGHT CHECKS AND TEACHES CALL PARAMETERS TO SN    PT W/ HX COPD- WAS ON O2 2L AT HS, NOW 3 LPM NC CONTINUOUS, PT HAS A PERCUSSION VEST SHE USES 2X/ DAY AS SHE TAKES HER NEBULIZER TXS    LIVES LIVES ALONE, DAUGHTER CURRENTLY STAYING WITH PT FOR \"A NIGHT OR 2\"  PT FELL THIS MORNING, DAUGHTER IS LOOKING TO PLACE PT IN R EHAB IF ARRANGEMENTS CAN BE MADE,    pt admitted to snf 7/21/22"

## 2022-07-22 NOTE — CASE COMMUNICATION
Patient missed a SN visit from [facility] on 7/22/22.     Reason: ADMITTED TO SNF.       For your records only.   As per home health protocol, MD must be notified of missed/cancelled visits; therefore the prescribed frequency was not met.

## 2022-07-24 LAB
BACTERIA SPEC AEROBE CULT: ABNORMAL
BACTERIA SPEC AEROBE CULT: ABNORMAL
GRAM STN SPEC: ABNORMAL

## 2022-07-25 ENCOUNTER — HOME CARE VISIT (OUTPATIENT)
Dept: HOME HEALTH SERVICES | Facility: HOME HEALTHCARE | Age: 87
End: 2022-07-25

## 2022-07-26 NOTE — CASE COMMUNICATION
Patient missed a SN  visit from [facility] on 7/25/22.     Reason: ADMITTTED TO REHAB FACILITY.       For your records only.   As per home health protocol, MD must be notified of missed/cancelled visits; therefore the prescribed frequency was not met.

## 2022-07-28 ENCOUNTER — READMISSION MANAGEMENT (OUTPATIENT)
Dept: CALL CENTER | Facility: HOSPITAL | Age: 87
End: 2022-07-28

## 2022-07-28 NOTE — OUTREACH NOTE
COPD/PN Week 2 Survey    Flowsheet Row Responses   [facility] facility patient discharged from? [place]   Does the patient have one of the following disease processes/diagnoses(primary or secondary)? COPD/Pneumonia   Was the primary reason for admission: Pneumonia   Week 2 attempt successful? No   Unsuccessful attempts Attempt 1          AXEL MATOS - Registered Nurse

## 2022-08-02 PROCEDURE — G0180 MD CERTIFICATION HHA PATIENT: HCPCS | Performed by: FAMILY MEDICINE

## 2022-08-03 ENCOUNTER — READMISSION MANAGEMENT (OUTPATIENT)
Dept: CALL CENTER | Facility: HOSPITAL | Age: 87
End: 2022-08-03

## 2022-08-03 NOTE — OUTREACH NOTE
COPD/PN Week 2 Survey    Flowsheet Row Responses   [facility] facility patient discharged from? [place]   Does the patient have one of the following disease processes/diagnoses(primary or secondary)? COPD/Pneumonia   Week 2 attempt successful? No   Revoke Decline to participate          JOSY ROBISON - Registered Nurse

## 2022-08-04 ENCOUNTER — TRANSCRIBE ORDERS (OUTPATIENT)
Dept: HOME HEALTH SERVICES | Facility: HOME HEALTHCARE | Age: 87
End: 2022-08-04

## 2022-08-04 DIAGNOSIS — J18.9 PNEUMONIA DUE TO INFECTIOUS ORGANISM, UNSPECIFIED LATERALITY, UNSPECIFIED PART OF LUNG: Primary | ICD-10-CM

## 2022-08-05 ENCOUNTER — READMISSION MANAGEMENT (OUTPATIENT)
Dept: CALL CENTER | Facility: HOSPITAL | Age: 87
End: 2022-08-05

## 2022-08-05 ENCOUNTER — TELEPHONE (OUTPATIENT)
Dept: CARDIOLOGY | Facility: CLINIC | Age: 87
End: 2022-08-05

## 2022-08-05 NOTE — OUTREACH NOTE
Prep Survey    Flowsheet Row Responses   [facility] patient discharged from? Non-BH   Is LACE score < 7 ? Non-BH Discharge   Emergency Room discharge w/ pulse ox? No   Eligibility [facility]   Date of Discharge 08/05/22   Discharge Disposition Home or Self Care   Discharge diagnosis Unknown   Does the patient have one of the following disease processes/diagnoses(primary or secondary)? Other   Prep survey completed? Yes          ANSHUL MAS - Registered Nurse

## 2022-08-05 NOTE — TELEPHONE ENCOUNTER
Patient called stating she was admitted from 7/-7/19 and has recently completed Pulmonary rehab. She states she is back in Afib and is concerned about it. Patient is asking to speak with Dr Robbins about what she needs to do next .

## 2022-08-05 NOTE — TELEPHONE ENCOUNTER
Patient was scheduled to see Liudmila on 8/16/2022 and was notified that she should continue taking metoprolol as it was prescribed today at discharge.She is going to keep a log of her BP and HR and will bring this to her next appointment.     Jyoti Madrigal RN  [facility] Triage  08/05/22  15:25 EDT

## 2022-08-07 ENCOUNTER — HOME CARE VISIT (OUTPATIENT)
Dept: HOME HEALTH SERVICES | Facility: HOME HEALTHCARE | Age: 87
End: 2022-08-07

## 2022-08-07 PROCEDURE — G0299 HHS/HOSPICE OF RN EA 15 MIN: HCPCS

## 2022-08-08 ENCOUNTER — TRANSITIONAL CARE MANAGEMENT TELEPHONE ENCOUNTER (OUTPATIENT)
Dept: CALL CENTER | Facility: HOSPITAL | Age: 87
End: 2022-08-08

## 2022-08-08 NOTE — HOME HEALTH
PT HOME FROM REHAB FACILITY- PT WAS PREVIOUSLY HOSPITALIZED [facility] FOR PNEUMONIA CAME HOME 7/18/22 AND FELL THE MORNING 7/19/22 ADMITTED TO [facility]20/22 [facility] . SN ART 8/7/22-PT NOW HOME STRONGER AND SAFER TO BE HOME    - HX COPD, CHF, MACUALR DEGEN- LEGALLY BLIND RT EYE    PT ALREADY DOES DAILY WEIGHT CHECKS AND TEACHES CALL PARAMETERS TO SN    PT W/ HX COPD- WAS ON O2 2L AT HS, NOW 3 LPM NC CONTINUOUS, PT HAS A PERCUSSION VEST SHE USES 2X/ DAY AS SHE TAKES HER NEBULIZER TXS

## 2022-08-08 NOTE — OUTREACH NOTE
Call Center TCM Note    Flowsheet Row Responses   [facility] patient discharged from? Non-BH   Does the patient have one of the following disease processes/diagnoses(primary or secondary)? Other   TCM attempt successful? Yes   Discharge diagnosis PNA, COPD    Meds reviewed with patient/caregiver? Yes   Is the patient having any side effects they believe may be caused by any medication additions or changes? No   Does the patient have all medications ordered at discharge? Yes   Is the patient taking all medications as directed (includes completed medication regime)? Yes   Does the patient have a primary care provider?  Yes   Does the patient have an appointment with their PCP within 7 days of discharge? No   Comments regarding PCP Pt declines TCM APPT with PCP Dr Rose at this time, stating all her current issues are lung related and she will be following up with CARDIO and PULM MD's first.    Has the patient kept scheduled appointments due by today? N/A   What is the Home health agency?   [facility]   Has home health visited the patient within 72 hours of discharge? Yes   What DME was ordered? [facility]  for CONTINOUS 02 needs   Has all DME been delivered? Yes   Psychosocial issues? No   Did the patient receive a copy of their discharge instructions? Yes   Nursing interventions Reviewed instructions with patient   What is the patient's perception of their health status since discharge? Improving   Is the patient/caregiver able to teach back signs and symptoms related to disease process for when to call PCP? Yes   Is the patient/caregiver able to teach back signs and symptoms related to disease process for when to call 911? Yes   Is the patient/caregiver able to teach back the hierarchy of who to call/visit for symptoms/problems? PCP, Specialist, Home health nurse, Urgent Care, ED, 911 Yes   If the patient is a current smoker, are they able to teach back resources for cessation? Not a  smoker   TCM call completed? Yes   Wrap up additional comments D/C DX: PNA & COPD exacerbation** Pt d/c from [facility] on 07/19/2022, & from SNF on 08/05/2022. She is feeling much better. Home O2 in place, pt states she is back to baseline only using hs. All needed medications in home. TG RN has seen pt and HH P.T. is in process. Pt declines TCM APPT with PCP Dr Rose at this time, stating all her current issues are lung related and she will be following up with CARDIO and PULM MD's first.           Agnieszka Silva MA    8/8/2022, 10:46 EDT

## 2022-08-09 ENCOUNTER — HOME CARE VISIT (OUTPATIENT)
Dept: HOME HEALTH SERVICES | Facility: HOME HEALTHCARE | Age: 87
End: 2022-08-09

## 2022-08-09 VITALS
HEART RATE: 77 BPM | OXYGEN SATURATION: 94 % | TEMPERATURE: 97.7 F | SYSTOLIC BLOOD PRESSURE: 108 MMHG | DIASTOLIC BLOOD PRESSURE: 64 MMHG

## 2022-08-09 VITALS
BODY MASS INDEX: 21.01 KG/M2 | SYSTOLIC BLOOD PRESSURE: 110 MMHG | WEIGHT: 118.56 LBS | DIASTOLIC BLOOD PRESSURE: 70 MMHG | HEIGHT: 63 IN | RESPIRATION RATE: 20 BRPM | OXYGEN SATURATION: 99 %

## 2022-08-09 PROCEDURE — G0151 HHCP-SERV OF PT,EA 15 MIN: HCPCS

## 2022-08-09 NOTE — HOME HEALTH
REASON FOR REFERRAL:  Decreased ability to ambulate in and out of the home following recent hospital and then rehab stay for pneumonia and falls resulting in functional decline and LE weakness.    MEDICAL HISTORY: COPD, CHF, Macular degeneration, a-fib, CAD    SKILLED PHYSICAL THERAPY IS MEDICALLY NECESSARY FOR: Instruction/education in lower extremity strengthening HEP, bed mobility/transfers training, gait training, balance training, fall prevention, and activity tolerance training to enable patient to safely exit home for medical appointments.

## 2022-08-10 ENCOUNTER — HOME CARE VISIT (OUTPATIENT)
Dept: HOME HEALTH SERVICES | Facility: HOME HEALTHCARE | Age: 87
End: 2022-08-10

## 2022-08-10 VITALS
RESPIRATION RATE: 24 BRPM | TEMPERATURE: 95.1 F | BODY MASS INDEX: 20.37 KG/M2 | DIASTOLIC BLOOD PRESSURE: 56 MMHG | HEART RATE: 72 BPM | SYSTOLIC BLOOD PRESSURE: 112 MMHG | OXYGEN SATURATION: 97 % | WEIGHT: 115 LBS

## 2022-08-10 PROCEDURE — G0299 HHS/HOSPICE OF RN EA 15 MIN: HCPCS

## 2022-08-10 NOTE — HOME HEALTH
PT HOME FROM REHAB FACILITY- PT WAS PREVIOUSLY HOSPITALIZED [facility] FOR PNEUMONIA CAME HOME 7/18/22 AND FELL THE MORNING 7/19/22 ADMITT[facility] TO [facility]/20[facility] . SN ART 8/7/22-PT NOW HOME STRONGER AND SAFER TO BE HOME - HX COPD, CHF, MACUALR DEGEN- LEGALLY BLIND RT EYE PT ALREADY DOES DAILY WEIGHT CHECKS AND TEACHES CALL PARAMETERS TO SN PT W/ HX COPD- WAS ON O2 2L AT HS, NOW 3 LPM NC CONTINUOUS, PT HAS A PERCUSSION VEST SHE USES 2X/ DAY AS SHE TAKES HER NEBULIZER TXS

## 2022-08-11 ENCOUNTER — HOME CARE VISIT (OUTPATIENT)
Dept: HOME HEALTH SERVICES | Facility: HOME HEALTHCARE | Age: 87
End: 2022-08-11

## 2022-08-11 PROCEDURE — G0152 HHCP-SERV OF OT,EA 15 MIN: HCPCS

## 2022-08-12 ENCOUNTER — HOME CARE VISIT (OUTPATIENT)
Dept: HOME HEALTH SERVICES | Facility: HOME HEALTHCARE | Age: 87
End: 2022-08-12

## 2022-08-12 VITALS
DIASTOLIC BLOOD PRESSURE: 64 MMHG | OXYGEN SATURATION: 97 % | SYSTOLIC BLOOD PRESSURE: 108 MMHG | TEMPERATURE: 97 F | HEART RATE: 61 BPM

## 2022-08-12 VITALS
WEIGHT: 117 LBS | OXYGEN SATURATION: 99 % | TEMPERATURE: 98.6 F | SYSTOLIC BLOOD PRESSURE: 118 MMHG | BODY MASS INDEX: 20.73 KG/M2 | RESPIRATION RATE: 24 BRPM | HEART RATE: 72 BPM | DIASTOLIC BLOOD PRESSURE: 68 MMHG

## 2022-08-12 LAB
FUNGUS WND CULT: ABNORMAL
FUNGUS WND CULT: ABNORMAL

## 2022-08-12 PROCEDURE — G0151 HHCP-SERV OF PT,EA 15 MIN: HCPCS

## 2022-08-12 PROCEDURE — G0299 HHS/HOSPICE OF RN EA 15 MIN: HCPCS

## 2022-08-12 NOTE — HOME HEALTH
"Subjective:Pt. reports that she is \"doing better\".  Pt. is highly motivated and cooperative with OT POC with goal to maintain her independence.    No falls or medication changes reported.    Reason for referral:  OT home health referral given d/t recent hospital and then rehab stay for pneumonia and a fall resulting in functional decline in ADL, IADL, and generalized weakness.     MEDICAL HISTORY: COPD, CHF, Macular degeneration, a-fib, CAD    PLOF:  Pt. was indep. with all ADL, IADL, mobility with no AD, and active at home and in the community    Home environment:  Pt. lives alone in a patio home.  She has very supportive family and neighbors who assists with meals, groceries, home management tasks.  She does not drive d/t impaired vision.    Plan for next visit:  OT to focus on safety with shower transfer, bathing in shower, establish BUE HEP, energy conservation and fall prevention strategies.     MEDICAL NECESSITY FOR ONGOING SKILLED THERAPY: Patient requires skilled occupational therapy for remediation of deficits to improve activities of daily living, home safety, falls prevention, monitor vital signs, including pulse oximetry, hand/ upper extremity function/range of motion/strength, therapeutic exercise, safety in home, and improve endurance and fatigue management with self care, and functional mobility with durable medical equipment as necessary."

## 2022-08-12 NOTE — HOME HEALTH
Plan for next visit:  -transfer training  -gait training with rolling walker  -education on HEP with progressions as appropriate  -stair mobility training  -standing balance exercises

## 2022-08-13 VITALS
SYSTOLIC BLOOD PRESSURE: 110 MMHG | DIASTOLIC BLOOD PRESSURE: 60 MMHG | RESPIRATION RATE: 18 BRPM | OXYGEN SATURATION: 95 % | TEMPERATURE: 96.9 F | HEART RATE: 62 BPM

## 2022-08-13 NOTE — HOME HEALTH
PT HOME FROM REHAB FACILITY- PT WAS PREVIOUSLY HOSPITALIZED [facility] FOR PNEUMONIA CAME HOME 7/18/22 AND FELL THE MORNING 7/19/22 [facility]ITT[facility] . SN ART 8/7/22-PT NOW HOME STRONGER AND SAFER TO BE HOME - HX COPD, CHF, MACUALR DEGEN- LEGALLY BLIND RT EYE PT ALREADY DOES DAILY WEIGHT CHECKS AND TEACHES CALL PARAMETERS TO SN PT W/ HX COPD- WAS ON O2 2L AT HS, NOW 3 LPM NC CONTINUOUS, PT HAS A PERCUSSION VEST SHE USES 2X/ DAY AS SHE TAKES HER NEBULIZER TXS

## 2022-08-15 ENCOUNTER — HOME CARE VISIT (OUTPATIENT)
Dept: HOME HEALTH SERVICES | Facility: HOME HEALTHCARE | Age: 87
End: 2022-08-15

## 2022-08-15 VITALS
HEART RATE: 93 BPM | DIASTOLIC BLOOD PRESSURE: 64 MMHG | OXYGEN SATURATION: 96 % | TEMPERATURE: 97.5 F | SYSTOLIC BLOOD PRESSURE: 106 MMHG

## 2022-08-15 PROCEDURE — G0299 HHS/HOSPICE OF RN EA 15 MIN: HCPCS

## 2022-08-15 PROCEDURE — G0151 HHCP-SERV OF PT,EA 15 MIN: HCPCS

## 2022-08-16 VITALS
BODY MASS INDEX: 20.37 KG/M2 | DIASTOLIC BLOOD PRESSURE: 64 MMHG | RESPIRATION RATE: 20 BRPM | TEMPERATURE: 97.1 F | SYSTOLIC BLOOD PRESSURE: 102 MMHG | HEART RATE: 96 BPM | WEIGHT: 115 LBS | OXYGEN SATURATION: 99 %

## 2022-08-16 NOTE — HOME HEALTH
PT HOME FROM REHAB FACILITY- PT WAS PREVIOUSLY HOSPITALIZED [facility] FOR PNEUMONIA CAME HOME 7/18/22 AND FELL THE MORNING 7/19/22 [facility] . SN ART 8/7/22-PT NOW HOME STRONGER AND SAFER TO BE HOME - HX COPD, CHF, MACUALR DEGEN- LEGALLY BLIND RT EYE PT ALREADY DOES DAILY WEIGHT CHECKS AND TEACHES CALL PARAMETERS TO SN PT W/ HX COPD- WAS ON O2 2L AT HS, NOW 3 LPM NC CONTINUOUS, PT HAS A PERCUSSION VEST SHE USES 2X/ DAY AS SHE TAKES HER NEBULIZER TXS

## 2022-08-17 ENCOUNTER — HOME CARE VISIT (OUTPATIENT)
Dept: HOME HEALTH SERVICES | Facility: HOME HEALTHCARE | Age: 87
End: 2022-08-17

## 2022-08-17 VITALS
SYSTOLIC BLOOD PRESSURE: 110 MMHG | HEART RATE: 80 BPM | OXYGEN SATURATION: 95 % | TEMPERATURE: 97.5 F | DIASTOLIC BLOOD PRESSURE: 64 MMHG

## 2022-08-17 PROCEDURE — G0151 HHCP-SERV OF PT,EA 15 MIN: HCPCS

## 2022-08-17 PROCEDURE — G0152 HHCP-SERV OF OT,EA 15 MIN: HCPCS

## 2022-08-17 NOTE — HOME HEALTH
Patient reports no falls or changes in medication since last visit.    Plan for next visit:  -transfer training  -gait training with rolling walker/cane  -education on HEP with progressions as appropriate  -stair mobility training  -standing balance exercises

## 2022-08-18 ENCOUNTER — OFFICE VISIT (OUTPATIENT)
Dept: CARDIOLOGY | Facility: CLINIC | Age: 87
End: 2022-08-18

## 2022-08-18 VITALS
HEIGHT: 63 IN | OXYGEN SATURATION: 97 % | DIASTOLIC BLOOD PRESSURE: 72 MMHG | WEIGHT: 120 LBS | BODY MASS INDEX: 21.26 KG/M2 | SYSTOLIC BLOOD PRESSURE: 126 MMHG | RESPIRATION RATE: 18 BRPM | HEART RATE: 80 BPM

## 2022-08-18 DIAGNOSIS — I49.3 VENTRICULAR PREMATURE BEATS: ICD-10-CM

## 2022-08-18 DIAGNOSIS — I48.0 PAROXYSMAL ATRIAL FIBRILLATION: ICD-10-CM

## 2022-08-18 DIAGNOSIS — I47.20 VENTRICULAR TACHYCARDIA: ICD-10-CM

## 2022-08-18 DIAGNOSIS — I50.32 CHRONIC DIASTOLIC CHF (CONGESTIVE HEART FAILURE): ICD-10-CM

## 2022-08-18 DIAGNOSIS — I25.10 CHRONIC CORONARY ARTERY DISEASE: ICD-10-CM

## 2022-08-18 DIAGNOSIS — I10 BENIGN ESSENTIAL HYPERTENSION: Primary | ICD-10-CM

## 2022-08-18 PROCEDURE — 99213 OFFICE O/P EST LOW 20 MIN: CPT | Performed by: INTERNAL MEDICINE

## 2022-08-18 PROCEDURE — 93000 ELECTROCARDIOGRAM COMPLETE: CPT | Performed by: INTERNAL MEDICINE

## 2022-08-18 RX ORDER — GUAIFENESIN 600 MG/1
600 TABLET, EXTENDED RELEASE ORAL
COMMUNITY
Start: 2022-07-19 | End: 2022-09-09 | Stop reason: SDUPTHER

## 2022-08-18 NOTE — PROGRESS NOTES
CARDIOLOGY    Marcie Robbins MD    ENCOUNTER DATE:  08/18/2022    Agnieszka Rizzo / 89 y.o. / female        CHIEF COMPLAINT / REASON FOR OFFICE VISIT     Heart Problem (Hospital follow up )      HISTORY OF PRESENT ILLNESS       HPI    Agnieszka Rizzo is a 89 y.o. female     This is a lady I met while she was hospitalized in November 2016. She has a significant pulmonary history and was having a bronchoscopy and unfortunately developed a pneumothorax. She was found to be in rate -controlled atrial fibrillation. She had previously been seen by Dr. Ana Goodrich for history of hypertension, hyperlipidemia, mild mitral valve prolapse and nonobstructive coronary artery disease diagnosed by heart catheterization in 2007.      Echocardiogram November 15, 2016:  All left ventricular wall segments contract normally.  Left ventricular function is normal. Estimated EF = 58%.  Left atrial cavity size is moderately dilated.  Mild tricuspid valve regurgitation is present.  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 They felt she would do well on oral anticoagulation and I started her on Pradaxa this was subsequently changed to warfarin. I did not do a stress test while she was hospitalized because she was wheezing and I did not fill comfortable giving her Lexiscan at that time and I did not when he is dobutamine because of her atrial fibrillation.       She was able to have a stress as an outpt on 12/19/16 and this was normal. She continued to complain of dyspnea on exertion and so I had her set up for a cardioversion. She had a couple of hospitalizations in the meantime for respiratory issues. While she was in the hospital in January 2017, I attempted to cardiovert her. I shocked her 3 times that she did not remain in sinus rhythm. I spoke with her family and we decided to continue with rate  control and anticoagulation.      She was hospitalized in 02/2017. She had started Voriconazole for treatment of pulmonary aspergillosis by Dr. Tucker. She became very nauseous and sick, and threw up for an entire day. She came into the hospital confused and weak. Her sodium was at 109. She was found to have a left clavicle fracture from a fall. She was discharged to [facility], where she has been. Unfortunately, as a result of the treatment for the aspergillosis, she developed C. difficile and was rehospitalized for treatment of this in March 2017. As a part of his treatment she did receive IV fluids. She was discharged but then I readmitted her on March 22 for IV diuresis. She was volume overloaded and in diastolic heart failure. She was back in the hospital on April 2 with more C. difficile colitis. Again she was treated with IV fluids and became volume overloaded. I diuresed her in the hospital and she was only mildly volume overloaded at discharge.     She was hospitalized from May 31 of June 9 2017.  While there she had some rapid ventricular response due to her atrial fibrillation and being sick with community-acquired pneumonia.  Some changes were made to her medication but in the end she was discharged on verapamil 360 mg once a day and digoxin 0.125 mg a day.     She was hospitalized in December 2019 with a COPD exacerbation/viral bronchitis.       She had a hospitalization over the summer for pneumonia but has recovered and is back at home.  She says sometimes she feels fatigued and like she has to take a nap.  I reassured her that is normal given that she is 89 years old.  Otherwise she is active and not having any symptoms.    REVIEW OF SYSTEMS     Review of Systems   Constitutional: Positive for weight loss. Negative for chills, fever and weight gain.   Cardiovascular: Negative for leg swelling.   Respiratory: Positive for cough. Negative for snoring and wheezing.    Hematologic/Lymphatic: Negative  "for bleeding problem. Bruises/bleeds easily.   Skin: Negative for color change.   Musculoskeletal: Negative for falls, joint pain and myalgias.   Gastrointestinal: Negative for melena.   Genitourinary: Negative for hematuria.   Neurological: Negative for excessive daytime sleepiness.   Psychiatric/Behavioral: Positive for depression. The patient is not nervous/anxious.          VITAL SIGNS     Visit Vitals  /72 (BP Location: Right arm, Patient Position: Sitting, Cuff Size: Adult)   Pulse 80   Resp 18   Ht 160 cm (63\")   Wt 54.4 kg (120 lb)   SpO2 97%   BMI 21.26 kg/m²         Wt Readings from Last 3 Encounters:   08/18/22 54.4 kg (120 lb)   08/15/22 52.2 kg (115 lb)   08/12/22 53.1 kg (117 lb)     Body mass index is 21.26 kg/m².      PHYSICAL EXAMINATION     Constitutional:       General: Not in acute distress.  Neck:      Vascular: No carotid bruit or JVD.   Pulmonary:      Effort: Pulmonary effort is normal.      Breath sounds: Normal breath sounds.   Cardiovascular:      Normal rate. Irregularly irregular rhythm.      Murmurs: There is no murmur.   Psychiatric:         Mood and Affect: Mood and affect normal.           REVIEWED DATA       ECG 12 Lead    Date/Time: 8/18/2022 10:13 AM  Performed by: Marcie Robbins MD  Authorized by: Marcie Robbins MD   Comparison: compared with previous ECG from 7/16/2022  Similar to previous ECG  Rhythm: atrial fibrillation  BPM: 80    Clinical impression: abnormal EKG              Lipid Panel    Lipid Panel 8/27/21   Total Cholesterol 167   Triglycerides 88   HDL Cholesterol 66 (A)   VLDL Cholesterol 16   LDL Cholesterol  85   LDL/HDL Ratio 1.26   (A) Abnormal value              Lab Results   Component Value Date    GLUCOSE 93 07/19/2022    BUN 14 07/19/2022    CREATININE 0.65 07/19/2022    EGFRIFNONA 67 08/27/2021    EGFRIFAFRI 87 11/14/2019    BCR 21.5 07/19/2022    K 3.6 07/19/2022    CO2 30.0 (H) 07/19/2022    CALCIUM 8.9 07/19/2022    ALBUMIN 3.00 (L) 07/19/2022 "    AST 17 07/09/2022    ALT 12 07/09/2022       ASSESSMENT & PLAN      Diagnosis Plan   1. Benign essential hypertension     2. Chronic coronary artery disease     3. Chronic diastolic CHF (congestive heart failure) (HCC)     4. Ventricular premature beats     5. Ventricular tachycardia (HCC)     6. Paroxysmal atrial fibrillation (HCC)         1. Atrial fibrillation. She failed cardioversion in January 2017. She has a CHADS2-Vasc score of 5.  She has been having problems with bleeding and bruising and her INR has been bouncing around.    This is better since being on Eliquis 2.5 mg twice a day.  Continue current dose.     2. Chronic diastolic heart failure. She is on torsemide.  She is euvolemic.     3. Dyspnea on exertion. Multifactorial.  Much improved.  She is exercising regularly.     4. Mitral valve prolapse with very mild mitral regurgitation.     5. Mild tricuspid regurgitation without pulmonary hypertension.     6. Nonobstructive coronary artery disease diagnosed by catheter in 2007. She has noted coronary artery calcification on CT scans. Nuclear stress 12/16 was normal.  he has had some chest discomfort so I am point to repeat the stress test.     7. Hypertension. Her blood pressure is controlled.  She monitors her blood pressure at home.  She gave me some of her numbers which seems to be very well controlled.     8. Hyperlipidemia. atorvastatin.      9. Hyponatremia. Stable.     10. C. difficile diarrhea. 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  She has completed her antibiotic regimen.      No changes to her medication.  I reassured her.  Follow-up in 6 months.    Orders Placed This Encounter   Procedures   • ECG 12 Lead     This order was created via procedure documentation     Order Specific Question:   Release to patient     Answer:   Routine Release           MEDICATIONS         Discharge Medications          Accurate as of August  18, 2022 10:15 AM. If you have any questions, ask your nurse or doctor.            Continue These Medications      Instructions Start Date   acetaminophen 325 MG tablet  Commonly known as: TYLENOL   650 mg, Oral, Every 4 Hours PRN      albuterol sulfate  (90 Base) MCG/ACT inhaler  Commonly known as: PROVENTIL HFA;VENTOLIN HFA;PROAIR HFA   2 puffs, Inhalation, Every 6 Hours PRN      apixaban 2.5 MG tablet tablet  Commonly known as: ELIQUIS   2.5 mg, Oral, Every 12 Hours Scheduled      atorvastatin 40 MG tablet  Commonly known as: LIPITOR   40 mg, Oral, Daily      azithromycin 250 MG tablet  Commonly known as: ZITHROMAX   250 mg, Oral, Daily      benzonatate 200 MG capsule  Commonly known as: TESSALON   200 mg, Oral, 3 Times Daily PRN      calcium carbonate 600 MG tablet  Commonly known as: OS-EVIN   600 mg, Oral, Daily, With vitamin D      cetirizine 10 MG tablet  Commonly known as: zyrTEC   10 mg, Oral, Daily      cholecalciferol 25 MCG (1000 UT) tablet  Commonly known as: VITAMIN D3   1,000 Units, Oral, Daily      citalopram 10 MG tablet  Commonly known as: CeleXA   10 mg, Oral, Daily      ezetimibe 10 MG tablet  Commonly known as: ZETIA   10 mg, Oral, Daily      FeroSul 325 (65 FE) MG tablet  Generic drug: ferrous sulfate   TAKE ONE TABLET BY MOUTH DAILY WITH BREAKFAST      Florajen Acidophilus capsule   1 tablet, Oral, Daily      fluticasone 50 MCG/ACT nasal spray  Commonly known as: FLONASE   2 sprays, Nasal, Nightly PRN      fluticasone-salmeterol 230-21 MCG/ACT inhaler  Commonly known as: ADVAIR HFA   2 puffs, Inhalation, 2 Times Daily - RT      glucosamine-chondroitin 500-400 MG capsule capsule   1 capsule, Oral, Daily      guaiFENesin 600 MG 12 hr tablet  Commonly known as: MUCINEX   600 mg, Oral, Every 12 Hours Scheduled      guaiFENesin 600 MG 12 hr tablet  Commonly known as: MUCINEX   600 mg, Oral      ipratropium-albuterol 0.5-2.5 mg/3 ml nebulizer  Commonly known as: DUO-NEB   3 mL, Nebulization,  Every 4 Hours PRN, SOB/WHEEZING      losartan 50 MG tablet  Commonly known as: COZAAR   100 mg, Oral, Daily      metoprolol tartrate 25 MG tablet  Commonly known as: LOPRESSOR   25 mg, Oral, Every 12 Hours Scheduled      multivitamin with minerals tablet tablet   1 tablet, Oral, 2 Times Daily      O2  Commonly known as: OXYGEN   3 L/min, Inhalation, Nightly      potassium chloride 10 MEQ CR capsule  Commonly known as: MICRO-K   10 mEq, Oral, Daily      sodium chloride 0.9 % nebulizer solution   3 mL, Nebulization, 2 Times Daily      tobramycin  MG/5ML nebulizer solution  Commonly known as: ISABEL   300 mg, Nebulization, 2 Times Daily - RT      torsemide 20 MG tablet  Commonly known as: DEMADEX   20 mg, Oral, 2 Times Daily      VITAMIN B COMPLEX PO   1 tablet, Oral, Daily, TAKES VIT B12 1000MCG DAILY               Marcie Robbins MD  08/18/22  10:15 EDT    **Juanpablo Disclaimer:   Much of this encounter note is an electronic transcription/translation of spoken language to printed text. The electronic translation of spoken language may permit erroneous, or at times, nonsensical words or phrases to be inadvertently transcribed. Although I have reviewed the note for such errors, some may still exist.

## 2022-08-19 ENCOUNTER — HOME CARE VISIT (OUTPATIENT)
Dept: HOME HEALTH SERVICES | Facility: HOME HEALTHCARE | Age: 87
End: 2022-08-19

## 2022-08-19 VITALS
OXYGEN SATURATION: 96 % | SYSTOLIC BLOOD PRESSURE: 110 MMHG | HEART RATE: 64 BPM | DIASTOLIC BLOOD PRESSURE: 65 MMHG | TEMPERATURE: 96.9 F | RESPIRATION RATE: 18 BRPM

## 2022-08-19 PROCEDURE — G0299 HHS/HOSPICE OF RN EA 15 MIN: HCPCS

## 2022-08-19 NOTE — HOME HEALTH
PT HOME FROM REHAB FACILITY- PT WAS PREVIOUSLY HOSPITALIZED [facility] FOR PNEUMONIA CAME HOME 7/18/22 AND FELL THE MORNING 7/19/22 [facility] . SN [facility] 8/7/22-PT NOW HOME STRONGER AND SAFER TO BE HOME - HX COPD, CHF, MACUALR DEGEN- LEGALLY BLIND RT EYE PT ALREADY DOES DAILY WEIGHT CHECKS AND TEACHES CALL PARAMETERS TO SN PT W/ HX COPD- WAS ON O2 2L AT HS, NOW 3 LPM NC CONTINUOUS, PT HAS A PERCUSSION VEST SHE USES 2X/ DAY AS SHE TAKES HER NEBULIZER TXS

## 2022-08-19 NOTE — HOME HEALTH
"Subjective:  Pt. reports that she is \"doing pretty good\".  Pt. is highly motivated and compliant with all recommendations and completing HEP.    No falls or medication changes reported.    Pt. is completing all ADL tasks with mod indep except she does have distant supervision when bathing in shower.  Pt.completing simple kitchen tasks and meal prep., and laundry task with mod indep.  Pt. has very supportive family, friends/neighbors who bring meals and assist with transportation, home tasks.  Pt. is completing functional transfers, mobility with mod indep with use of RW.  She is eager to not have to utilize walk in the home, but understands she will need a walker for walking outdoors. OT reviewed energy conservation tech, fall prevention strategies, and addressed BUE HEP and provided written instructions with illustrations - pt. is demonstrating independence and good safety awareness. OT services discharged this date with all goals met.    Discharge plan to self with good family support.    PT, RN to continue."

## 2022-08-21 VITALS
WEIGHT: 116 LBS | BODY MASS INDEX: 20.55 KG/M2 | RESPIRATION RATE: 20 BRPM | TEMPERATURE: 96.8 F | HEART RATE: 92 BPM | OXYGEN SATURATION: 97 % | DIASTOLIC BLOOD PRESSURE: 56 MMHG | SYSTOLIC BLOOD PRESSURE: 104 MMHG

## 2022-08-22 ENCOUNTER — HOME CARE VISIT (OUTPATIENT)
Dept: HOME HEALTH SERVICES | Facility: HOME HEALTHCARE | Age: 87
End: 2022-08-22

## 2022-08-22 VITALS
DIASTOLIC BLOOD PRESSURE: 64 MMHG | TEMPERATURE: 98 F | SYSTOLIC BLOOD PRESSURE: 106 MMHG | OXYGEN SATURATION: 94 % | HEART RATE: 95 BPM

## 2022-08-22 PROCEDURE — G0151 HHCP-SERV OF PT,EA 15 MIN: HCPCS

## 2022-08-22 NOTE — HOME HEALTH
Patient reports no falls or changes in medication since last visit.    Plan for next visit:  -transfer training  -gait training with cane/walker  -education on HEP with progressions as appropriate  -stair mobility training  -standing balance exercises

## 2022-08-23 ENCOUNTER — HOME CARE VISIT (OUTPATIENT)
Dept: HOME HEALTH SERVICES | Facility: HOME HEALTHCARE | Age: 87
End: 2022-08-23

## 2022-08-23 VITALS
WEIGHT: 117 LBS | SYSTOLIC BLOOD PRESSURE: 114 MMHG | DIASTOLIC BLOOD PRESSURE: 68 MMHG | HEART RATE: 92 BPM | TEMPERATURE: 97.7 F | RESPIRATION RATE: 20 BRPM | BODY MASS INDEX: 20.73 KG/M2 | OXYGEN SATURATION: 97 %

## 2022-08-23 PROCEDURE — G0299 HHS/HOSPICE OF RN EA 15 MIN: HCPCS

## 2022-08-24 NOTE — HOME HEALTH
PT HOME FROM REHAB FACILITY- PT WAS PREVIOUSLY HOSPITALIZED [facility] FOR PNEUMONIA CAME HOME 7/18/22 AND FELL THE MORNING 7/19/22 [facility] . SN [facility] 8/7/22-PT NOW HOME STRONGER AND SAFER TO BE HOME - HX COPD, CHF, MACUALR DEGEN- LEGALLY BLIND RT EYE PT ALREADY DOES DAILY WEIGHT CHECKS AND TEACHES CALL PARAMETERS TO SN PT W/ HX COPD- WAS ON O2 2L AT HS, NOW 3 LPM NC CONTINUOUS, PT HAS A PERCUSSION VEST SHE USES 2X/ DAY AS SHE TAKES HER NEBULIZER TXS

## 2022-08-25 ENCOUNTER — HOME CARE VISIT (OUTPATIENT)
Dept: HOME HEALTH SERVICES | Facility: HOME HEALTHCARE | Age: 87
End: 2022-08-25

## 2022-08-25 VITALS
DIASTOLIC BLOOD PRESSURE: 66 MMHG | OXYGEN SATURATION: 97 % | TEMPERATURE: 97.8 F | SYSTOLIC BLOOD PRESSURE: 110 MMHG | HEART RATE: 95 BPM

## 2022-08-25 PROCEDURE — G0151 HHCP-SERV OF PT,EA 15 MIN: HCPCS

## 2022-08-26 LAB
MYCOBACTERIUM SPEC CULT: NORMAL
NIGHT BLUE STAIN TISS: NORMAL

## 2022-08-29 LAB
MYCOBACTERIUM SPEC CULT: NORMAL
NIGHT BLUE STAIN TISS: NORMAL

## 2022-08-31 ENCOUNTER — HOME CARE VISIT (OUTPATIENT)
Dept: HOME HEALTH SERVICES | Facility: HOME HEALTHCARE | Age: 87
End: 2022-08-31

## 2022-08-31 VITALS
HEART RATE: 72 BPM | OXYGEN SATURATION: 99 % | TEMPERATURE: 97.3 F | DIASTOLIC BLOOD PRESSURE: 60 MMHG | SYSTOLIC BLOOD PRESSURE: 116 MMHG

## 2022-08-31 PROCEDURE — G0299 HHS/HOSPICE OF RN EA 15 MIN: HCPCS

## 2022-09-07 ENCOUNTER — HOME CARE VISIT (OUTPATIENT)
Dept: HOME HEALTH SERVICES | Facility: HOME HEALTHCARE | Age: 87
End: 2022-09-07

## 2022-09-07 PROCEDURE — G0299 HHS/HOSPICE OF RN EA 15 MIN: HCPCS

## 2022-09-09 ENCOUNTER — OFFICE VISIT (OUTPATIENT)
Dept: INTERNAL MEDICINE | Facility: CLINIC | Age: 87
End: 2022-09-09

## 2022-09-09 VITALS
DIASTOLIC BLOOD PRESSURE: 60 MMHG | HEART RATE: 75 BPM | WEIGHT: 121.8 LBS | HEIGHT: 63 IN | SYSTOLIC BLOOD PRESSURE: 126 MMHG | OXYGEN SATURATION: 94 % | BODY MASS INDEX: 21.58 KG/M2

## 2022-09-09 DIAGNOSIS — R42 DIZZINESS: ICD-10-CM

## 2022-09-09 DIAGNOSIS — F41.9 ANXIETY: Primary | ICD-10-CM

## 2022-09-09 DIAGNOSIS — J47.1 BRONCHIECTASIS WITH ACUTE EXACERBATION: ICD-10-CM

## 2022-09-09 DIAGNOSIS — I10 PRIMARY HYPERTENSION: ICD-10-CM

## 2022-09-09 DIAGNOSIS — I48.0 PAROXYSMAL ATRIAL FIBRILLATION: ICD-10-CM

## 2022-09-09 DIAGNOSIS — J18.9 PNEUMONIA OF RIGHT LUNG DUE TO INFECTIOUS ORGANISM, UNSPECIFIED PART OF LUNG: ICD-10-CM

## 2022-09-09 PROBLEM — J20.4 ACUTE BRONCHITIS DUE TO PARAINFLUENZA VIRUS: Status: RESOLVED | Noted: 2019-12-16 | Resolved: 2022-09-09

## 2022-09-09 PROCEDURE — 99214 OFFICE O/P EST MOD 30 MIN: CPT | Performed by: FAMILY MEDICINE

## 2022-09-09 PROCEDURE — 1170F FXNL STATUS ASSESSED: CPT | Performed by: FAMILY MEDICINE

## 2022-09-09 PROCEDURE — G0439 PPPS, SUBSEQ VISIT: HCPCS | Performed by: FAMILY MEDICINE

## 2022-09-09 PROCEDURE — 1159F MED LIST DOCD IN RCRD: CPT | Performed by: FAMILY MEDICINE

## 2022-09-09 RX ORDER — FERROUS SULFATE 325(65) MG
1 TABLET ORAL
Qty: 90 TABLET | Refills: 2 | Status: SHIPPED | OUTPATIENT
Start: 2022-09-09 | End: 2022-10-18 | Stop reason: SDUPTHER

## 2022-09-09 RX ORDER — CITALOPRAM 10 MG/1
10 TABLET ORAL DAILY
Qty: 90 TABLET | Refills: 2 | Status: ON HOLD | OUTPATIENT
Start: 2022-09-09

## 2022-09-09 NOTE — PROGRESS NOTES
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  Cardiology and pulmonology were consulted.  Cardiology was consulted for management of her proximal atrial fibrillation that was not improving with IV metoprolol.  She was previously on verapamil, but this was discontinued and she was started on metoprolol.  Regarding her pneumonia, she was initially started on doxycycline and Rocephin, of which she completed a 5-day course.  There was also some volume overload, and she was initially on torsemide that was decreased to 20 mg at discharge.  Pulmonary medicine was consulted and she underwent a bronchoscopy that showed mucous plugging.  Cultures of the bronchoalveolar  She grew 50,000 CFU's of gram-negative bacilli.  Short started on ceftriaxone which was subsequently transitioned to cefdinir at the time of discharge.  She will complete a total antibiotic course with a duration of 7 days.  She was previously on nocturnal oxygen.  She has required intermittently daytime oxygen as well.  She will need continuous oxygen therapy after discharge.  Speech-language pathology was consulted over concerns of aspiration and appropriate dietary recommendations were made.    Discharged on cefdinir fluconazole metoprolol tartrate guaifenesin and benzonatate  She is here for follow-up with need for oxygen therapy mostly at night as well as evaluation of her starting citalopram for anxiety  She has routine follow-up with cardiology and pulmonology.  He has intermittent episodes of dizziness pressure readings are  "usually less than 140/90 greater than 100/60 she does not feel any rapid heartbeat although she does feel that its irregular due to her atrial fibrillation  Objective   Vital Signs:  /60 (BP Location: Right arm, Patient Position: Sitting, Cuff Size: Adult)   Pulse 75   Ht 158.8 cm (62.5\")   Wt 55.2 kg (121 lb 12.8 oz)   SpO2 94%   BMI 21.92 kg/m²   Estimated body mass index is 21.92 kg/m² as calculated from the following:    Height as of this encounter: 158.8 cm (62.5\").    Weight as of this encounter: 55.2 kg (121 lb 12.8 oz).    BMI is within normal parameters. No other follow-up for BMI required.      Physical Exam  Vitals and nursing note reviewed.   Constitutional:       Appearance: Normal appearance.   HENT:      Head: Normocephalic and atraumatic.      Right Ear: Tympanic membrane, ear canal and external ear normal.      Left Ear: Tympanic membrane, ear canal and external ear normal.   Cardiovascular:      Rate and Rhythm: Normal rate and regular rhythm.      Pulses: Normal pulses.      Heart sounds: Normal heart sounds.   Pulmonary:      Effort: Pulmonary effort is normal.      Breath sounds: Normal breath sounds.   Musculoskeletal:      Right lower leg: No edema.      Left lower leg: No edema.   Neurological:      Mental Status: She is alert.   Psychiatric:         Mood and Affect: Mood normal.         Behavior: Behavior normal.         Thought Content: Thought content normal.         Judgment: Judgment normal.        Result Review :    Common labs    Common Labsle 7/17/22 7/17/22 7/18/22 7/18/22 7/19/22 7/19/22    0745 0745 0659 [id number] [id number]   Glucose  90  92 93    BUN  25 (A)  16 14    Creatinine  0.89  0.60 0.65    Sodium  141  137 138    Potassium  3.9  4.1 3.6    Chloride  100  99 99    Calcium  9.1  9.0 8.9    Albumin  3.10 (A)  3.30 (A) 3.00 (A)    WBC 14.56 (A)  21.40 (A)   15.18 (A)   Hemoglobin 10.4 (A)  10.6 (A)   9.7 (A)   Hematocrit 31.6 (A)  32.0 (A)   29.5 (A)   Platelets 387  " 387   336   (A) Abnormal value            Data reviewed: Radiologic studies 7/15/2022 development of left lower lobe pneumonia on chest x-ray     Correction right lower lobe     Assessment and Plan   Diagnoses and all orders for this visit:    1. Anxiety (Primary)    2. Primary hypertension    3. Bronchiectasis with acute exacerbation (HCC)    4. Pneumonia of right lung due to infectious organism, unspecified part of lung    5. Dizziness    6. Paroxysmal atrial fibrillation (HCC)    Other orders  -     citalopram (CeleXA) 10 MG tablet; Take 1 tablet by mouth Daily.  Dispense: 90 tablet; Refill: 2  -     ferrous sulfate (FeroSul) 325 (65 FE) MG tablet; Take 1 tablet by mouth Daily With Breakfast.  Dispense: 90 tablet; Refill: 2    Anxiety citalopram  Hypertension metoprolol losartan  Bronchiectasis DuoNeb Advair  Dizziness increase fluids  Atrial fibrillation monitor for accelerated heart rate  Patient prefers to follow-up yearly since she has cardiology and pulmonology  Will call if worsening anxiety symptoms       Follow Up   Return in about 6 months (around 3/9/2023), or if symptoms worsen or fail to improve, for Recheck.  Patient was given instructions and counseling regarding her condition or for health maintenance advice. Please see specific information pulled into the AVS if appropriate.

## 2022-09-09 NOTE — PROGRESS NOTES
The ABCs of the Annual Wellness Visit  Subsequent Medicare Wellness Visit    Chief Complaint   Patient presents with   • follow up to hypertension   • Medicare Wellness-subsequent      Subjective    History of Present Illness:  Agnieszka Rizzo is a 89 y.o. female who presents for a Subsequent Medicare Wellness Visit.    The following portions of the patient's history were reviewed and   updated as appropriate: allergies, current medications, past family history, past medical history, past social history, past surgical history and problem list.     Compared to one year ago, the patient feels her physical   health is the same.    Compared to one year ago, the patient feels her mental   health is the same.    Recent Hospitalizations:  This patient has had a [facility] admission record on file within the last 365 days.    Current Medical Providers:  Patient Care Team:  Matt Rose MD as PCP - General (Family Medicine)  Marcie Robbins MD as Consulting Physician (Cardiology)  Nilesh Danielle MD as Consulting Physician (Urology)  Lina Morris RPH as Pharmacist  Lev Mckeon PharmD as Pharmacist (Pharmacy)  Rogelio Tucker MD as Consulting Physician (Pulmonary Disease)    Outpatient Medications Prior to Visit   Medication Sig Dispense Refill   • acetaminophen (TYLENOL) 325 MG tablet Take 650 mg by mouth Every 4 (Four) Hours As Needed for Moderate Pain .     • albuterol sulfate  (90 Base) MCG/ACT inhaler Inhale 2 puffs Every 6 (Six) Hours As Needed for Wheezing.     • apixaban (ELIQUIS) 2.5 MG tablet tablet Take 1 tablet by mouth Every 12 (Twelve) Hours. 180 tablet 3   • atorvastatin (LIPITOR) 40 MG tablet Take 1 tablet by mouth Daily. 90 tablet 3   • azithromycin (ZITHROMAX) 250 MG tablet Take 250 mg by mouth Daily.     • B Complex Vitamins (VITAMIN B COMPLEX PO) Take 1 tablet by mouth Daily. TAKES VIT B12 1000MCG DAILY     • benzonatate (TESSALON) 200 MG capsule Take 1 capsule by mouth 3 (Three)  Times a Day As Needed for Cough. 60 capsule 0   • calcium carbonate (OS-EVIN) 600 MG tablet Take 600 mg by mouth Daily. With vitamin D     • cetirizine (zyrTEC) 10 MG tablet Take 10 mg by mouth Daily.     • cholecalciferol (VITAMIN D3) 25 MCG (1000 UT) tablet Take 1,000 Units by mouth Daily.     • ezetimibe (ZETIA) 10 MG tablet Take 1 tablet by mouth Daily. 90 tablet 3   • fluticasone (FLONASE) 50 MCG/ACT nasal spray 2 sprays into the nostril(s) as directed by provider At Night As Needed for Allergies.     • fluticasone-salmeterol (ADVAIR HFA) 230-21 MCG/ACT inhaler Inhale 2 puffs 2 (Two) Times a Day.     • glucosamine-chondroitin 500-400 MG capsule capsule Take 1 capsule by mouth Daily.     • guaiFENesin (MUCINEX) 600 MG 12 hr tablet Take 1 tablet by mouth Every 12 (Twelve) Hours. 30 tablet 0   • ipratropium-albuterol (DUO-NEB) 0.5-2.5 mg/3 ml nebulizer Take 3 mL by nebulization Every 4 (Four) Hours As Needed. SOB/WHEEZING     • Lactobacillus (FLORAJEN ACIDOPHILUS) capsule Take 1 tablet by mouth Daily.     • losartan (COZAAR) 50 MG tablet Take 100 mg by mouth Daily.     • metoprolol tartrate (LOPRESSOR) 25 MG tablet Take 1 tablet by mouth Every 12 (Twelve) Hours. 180 tablet 3   • Multiple Vitamins-Minerals (PRESERVISION AREDS PO) Take 1 tablet by mouth 2 (Two) Times a Day.     • O2 (OXYGEN) Inhale 3 L/min Every Night.     • potassium chloride (MICRO-K) 10 MEQ CR capsule Take 1 capsule by mouth Daily. 90 capsule 3   • sodium chloride 0.9 % nebulizer solution Take 3 mL by nebulization 2 (Two) Times a Day.     • tobramycin PF (ISABEL) 300 MG/5ML nebulizer solution Take 300 mg by nebulization 2 (Two) Times a Day.     • torsemide (DEMADEX) 20 MG tablet Take 1 tablet by mouth 2 (Two) Times a Day. 180 tablet 3   • citalopram (CeleXA) 10 MG tablet Take 10 mg by mouth Daily.     • FeroSul 325 (65 Fe) MG tablet TAKE ONE TABLET BY MOUTH DAILY WITH BREAKFAST 90 tablet 0   • guaiFENesin (MUCINEX) 600 MG 12 hr tablet Take 600 mg by  "mouth.       No facility-administered medications prior to visit.       No opioid medication identified on active medication list. I have reviewed chart for other potential  high risk medication/s and harmful drug interactions in the elderly.          Aspirin is not on active medication list.  Aspirin use is contraindicated for this patient due to: current use of Eliquis.  .    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[facility])   • Paroxysmal atrial fibrillation ([facility])   • Anxiety   • Exposure to hepatitis A   • Medicare annual wellness visit, subsequent   • Abdominal pain   • Herpes infection   • Moderate asthma with acute exacerbation   • Bronchitis, acute, with bronchospasm   • Abnormal EKG   • COPD with acute exacerbation    • Fall   • Laceration of left upper extremity   • Multiple skin tears   • Alteration in anticoagulation   • Blind right eye   • Clinical diagnosis of COVID-19   • Pneumonia of right lung due to infectious organism, unspecified part of lung   • COPD (chronic obstructive pulmonary disease) ([facility])     Advance Care Planning   Advance Directive is not on file.  ACP discussion was held with the patient during this visit. Patient has an advance directive (not in EMR), copy requested.  Michelle Zaldivar- daughter        Objective       Vitals:    09/09/22 1056   BP: 126/60   BP Location: Right arm   Patient Position: Sitting   Cuff Size: Adult   Pulse: 75   SpO2: 94%   Weight: 55.2 kg (121 lb 12.8 oz)   Height: 158.8 cm (62.5\")     BMI Readings from Last 1 Encounters:   09/09/22 " 21.92 kg/m²   BMI is within normal parameters. No follow-up required.    Does the patient have evidence of cognitive impairment? No    Physical Exam            HEALTH RISK ASSESSMENT    Smoking Status:  Social History     Tobacco Use   Smoking Status Never Smoker   Smokeless Tobacco Never Used   Tobacco Comment    caffiene daily     Alcohol Consumption:  Social History     Substance and Sexual Activity   Alcohol Use Yes   • Alcohol/week: 2.0 standard drinks   • Types: 1 Glasses of wine, 1 Shots of liquor per week    Comment: occasional     Fall Risk Screen:    STEADI Fall Risk Assessment was completed, and patient is at MODERATE risk for falls. Assessment completed on:9/9/2022    Depression Screening:  PHQ-2/PHQ-9 Depression Screening 9/9/2022   Retired PHQ-9 Total Score -   Retired Total Score -   Little Interest or Pleasure in Doing Things 0-->not at all   Feeling Down, Depressed or Hopeless 0-->not at all   PHQ-9: Brief Depression Severity Measure Score 0       Health Habits and Functional and Cognitive Screening:  Functional & Cognitive Status 9/9/2022   Do you have difficulty preparing food and eating? No   Do you have difficulty bathing yourself, getting dressed or grooming yourself? No   Do you have difficulty using the toilet? No   Do you have difficulty moving around from place to place? No   Do you have trouble with steps or getting out of a bed or a chair? No   Current Diet Well Balanced Diet   Dental Exam Up to date   Eye Exam Up to date   Exercise (times per week) 7 times per week   Current Exercises Include Home Exercise Program (TV, Computer, Etc.);Walking   Current Exercise Activities Include -   Do you need help using the phone?  No   Are you deaf or do you have serious difficulty hearing?  No   Do you need help with transportation? Yes   Do you need help shopping? Yes   Do you need help preparing meals?  No   Do you need help with housework?  No   Do you need help with laundry? No   Do you need  help taking your medications? No   Do you need help managing money? No   Do you ever drive or ride in a car without wearing a seat belt? No   Have you felt unusual stress, anger or loneliness in the last month? Yes   Who do you live with? Alone   If you need help, do you have trouble finding someone available to you? No   Have you been bothered in the last four weeks by sexual problems? No   Do you have difficulty concentrating, remembering or making decisions? No       Age-appropriate Screening Schedule:  Refer to the list below for future screening recommendations based on patient's age, sex and/or medical conditions. Orders for these recommended tests are listed in the plan section. The patient has been provided with a written plan.    Health Maintenance   Topic Date Due   • DXA SCAN  Never done   • LIPID PANEL  08/27/2022   • TDAP/TD VACCINES (2 - Td or Tdap) 07/12/2031   • ZOSTER VACCINE  Completed   • INFLUENZA VACCINE  Discontinued              Assessment & Plan     CMS Preventative Services Quick Reference  Risk Factors Identified During Encounter  Inactivity/Sedentary  Pneumonia chronic lung disease  The above risks/problems have been discussed with the patient.  Follow up actions/plans if indicated are seen below in the Assessment/Plan Section.  Pertinent information has been shared with the patient in the After Visit Summary.    Diagnoses and all orders for this visit:    1. Anxiety (Primary)    2. Primary hypertension    3. Bronchiectasis with acute exacerbation (HCC)    4. Pneumonia of right lung due to infectious organism, unspecified part of lung    Other orders  -     citalopram (CeleXA) 10 MG tablet; Take 1 tablet by mouth Daily.  Dispense: 90 tablet; Refill: 2  -     ferrous sulfate (FeroSul) 325 (65 FE) MG tablet; Take 1 tablet by mouth Daily With Breakfast.  Dispense: 90 tablet; Refill: 2        Follow Up:   Return in about 6 months (around 3/9/2023), or if symptoms worsen or fail to improve, for  Recheck.     An After Visit Summary and PPPS were given to the patient.  Ongoing  management of chronic medical problems.

## 2022-09-27 DIAGNOSIS — I10 BENIGN ESSENTIAL HYPERTENSION: ICD-10-CM

## 2022-09-27 RX ORDER — TORSEMIDE 20 MG/1
20 TABLET ORAL 2 TIMES DAILY
Qty: 180 TABLET | Refills: 3 | Status: ON HOLD | OUTPATIENT
Start: 2022-09-27

## 2022-09-27 RX ORDER — POTASSIUM CHLORIDE 750 MG/1
10 CAPSULE, EXTENDED RELEASE ORAL DAILY
Qty: 90 CAPSULE | Refills: 3 | Status: ON HOLD | OUTPATIENT
Start: 2022-09-27

## 2022-09-27 NOTE — TELEPHONE ENCOUNTER
Rx Refill Note  Requested Prescriptions     Pending Prescriptions Disp Refills   • torsemide (DEMADEX) 20 MG tablet 180 tablet 3     Sig: Take 1 tablet by mouth 2 (Two) Times a Day.   • potassium chloride (MICRO-K) 10 MEQ CR capsule 90 capsule 3     Sig: Take 1 capsule by mouth Daily.      Last office visit with prescribing clinician: 8/18/2022      Next office visit with prescribing clinician: 2/28/2023            Kamryn Harrison MA  09/27/22, 13:44 EDT

## 2022-10-18 RX ORDER — FERROUS SULFATE 325(65) MG
1 TABLET ORAL
Qty: 90 TABLET | Refills: 2 | Status: SHIPPED | OUTPATIENT
Start: 2022-10-18 | End: 2022-10-19

## 2022-10-18 NOTE — TELEPHONE ENCOUNTER
Caller: Agnieszka Rizzo    Relationship: Self    Best call back number: [phone]    Requested Prescriptions:   Requested Prescriptions     Pending Prescriptions Disp Refills   • ferrous sulfate (FeroSul) 325 (65 FE) MG tablet 90 tablet 2     Sig: Take 1 tablet by mouth Daily With Breakfast.        Pharmacy where request should be sent: [facility] - [address] [phone] [facility] [phone] FX     Additional details provided by patient: ONE WEEK SUPPLY     Does the patient have less than a 3 day supply:  [] Yes  [x] No    Dennise Beal Rep   10/18/22 14:52 EDT

## 2022-10-19 RX ORDER — FERROUS SULFATE 325(65) MG
TABLET ORAL
Qty: 90 TABLET | Refills: 0 | Status: ON HOLD | OUTPATIENT
Start: 2022-10-19

## 2022-11-11 RX ORDER — ATORVASTATIN CALCIUM 40 MG/1
40 TABLET, FILM COATED ORAL DAILY
Qty: 90 TABLET | Refills: 3 | Status: ON HOLD | OUTPATIENT
Start: 2022-11-11

## 2022-11-11 NOTE — TELEPHONE ENCOUNTER
Rx Refill Note  Requested Prescriptions     Pending Prescriptions Disp Refills   • atorvastatin (LIPITOR) 40 MG tablet 90 tablet 3     Sig: Take 1 tablet by mouth Daily.      Last office visit with prescribing clinician: 8/18/2022      Next office visit with prescribing clinician: 2/28/2023            Kamryn Harrison MA  11/11/22, 11:21 EST

## 2022-11-22 NOTE — TELEPHONE ENCOUNTER
Patient is requesting samples of Eliquis 2.5 MG. She states she will pick them up at the [facility] office before next Thursday.     I have provided 4 boxes of Eliquis 2.5 MG for patient and they will be with itz at [facility] for .       LEYLA Harry  11/22/220

## 2022-11-23 RX ORDER — EZETIMIBE 10 MG/1
10 TABLET ORAL DAILY
Qty: 90 TABLET | Refills: 3 | Status: ON HOLD | OUTPATIENT
Start: 2022-11-23

## 2022-11-23 NOTE — TELEPHONE ENCOUNTER
Rx Refill Note  Requested Prescriptions     Pending Prescriptions Disp Refills   • ezetimibe (ZETIA) 10 MG tablet 90 tablet 3     Sig: Take 1 tablet by mouth Daily.      Last office visit with prescribing clinician: 8/18/2022      Next office visit with prescribing clinician: 2/28/2023            Kamryn Harrison MA  11/23/22, 08:46 EST

## 2022-12-05 NOTE — TELEPHONE ENCOUNTER
Rx Refill Note  Requested Prescriptions     Pending Prescriptions Disp Refills   • apixaban (ELIQUIS) 2.5 MG tablet tablet 180 tablet 3     Sig: Take 1 tablet by mouth Every 12 (Twelve) Hours.      Last office visit with prescribing clinician: 8/18/2022   Last telemedicine visit with prescribing clinician: Visit date not found   Next office visit with prescribing clinician: 2/28/2023                         Would you like a call back once the refill request has been completed: [] Yes [] No    If the office needs to give you a call back, can they leave a voicemail: [] Yes [] No    Kamryn Harrison MA  12/05/22, 12:31 EST

## 2022-12-14 ENCOUNTER — TRANSCRIBE ORDERS (OUTPATIENT)
Dept: ADMINISTRATIVE | Facility: HOSPITAL | Age: 87
End: 2022-12-14

## 2022-12-14 DIAGNOSIS — R13.10 DYSPHAGIA, UNSPECIFIED TYPE: Primary | ICD-10-CM

## 2022-12-15 ENCOUNTER — TELEPHONE (OUTPATIENT)
Dept: CARDIOLOGY | Facility: CLINIC | Age: 87
End: 2022-12-15

## 2022-12-15 NOTE — TELEPHONE ENCOUNTER
Spoke with patient. Advised to go to the ER if she becomes short of breath, patient was agreeable.     LEYLA Harry  12/15/2022

## 2022-12-15 NOTE — TELEPHONE ENCOUNTER
Patient is currently sick with Pneumonia. States that she is currently in Afib. She has been taking her Eliquis as prescribed and doubling up on her Beta blocker as directed. She mentions that her heart rate will go rather high the highest it has gone is 245 but dropped to 41 after she got up and walked a few steps. She is asking if there is anything additional she needs to do?       Kamryn, RMA   12/15/2022

## 2022-12-15 NOTE — TELEPHONE ENCOUNTER
Nothing else to do.  She always goes into A. fib when she gets sick with pneumonia.  She should continue to do what she is doing and go to the hospital if she is having any trouble with breathing.

## 2022-12-27 ENCOUNTER — HOSPITAL ENCOUNTER (OUTPATIENT)
Dept: GENERAL RADIOLOGY | Facility: HOSPITAL | Age: 87
Discharge: HOME | End: 2022-12-27
Admitting: INTERNAL MEDICINE

## 2022-12-27 DIAGNOSIS — R13.10 DYSPHAGIA, UNSPECIFIED TYPE: ICD-10-CM

## 2022-12-27 PROCEDURE — 63710000001 BARIUM SULFATE 40 % RECONSTITUTED SUSPENSION: Performed by: INTERNAL MEDICINE

## 2022-12-27 PROCEDURE — 63710000001 BARIUM SULFATE 98 % RECONSTITUTED SUSPENSION: Performed by: INTERNAL MEDICINE

## 2022-12-27 PROCEDURE — A9270 NON-COVERED ITEM OR SERVICE: HCPCS | Performed by: INTERNAL MEDICINE

## 2022-12-27 PROCEDURE — 92611 MOTION FLUOROSCOPY/SWALLOW: CPT | Performed by: SPEECH-LANGUAGE PATHOLOGIST

## 2022-12-27 PROCEDURE — 74230 X-RAY XM SWLNG FUNCJ C+: CPT

## 2022-12-27 PROCEDURE — 63710000001 BARIUM SULFATE 60 % CREAM: Performed by: INTERNAL MEDICINE

## 2022-12-27 RX ADMIN — BARIUM SULFATE 4 ML: 980 POWDER, FOR SUSPENSION ORAL at 10:30

## 2022-12-27 RX ADMIN — BARIUM SULFATE 1 TEASPOON(S): 0.6 CREAM ORAL at 10:30

## 2022-12-27 RX ADMIN — BARIUM SULFATE 55 ML: 0.81 POWDER, FOR SUSPENSION ORAL at 10:30

## 2022-12-27 NOTE — MBS/VFSS/FEES
"Outpatient Speech Language Pathology   Adult Swallow Initial Evaluation-VFSS  [facility]     Patient Name: Agnieszka Rizzo  : 1930  MRN: [id number]  Today's Date: 2022         Visit Date: 2022       SPEECH-LANGUAGE PATHOLOGY EVALUTION - VFSS  Subjective: The patient was seen on this date for a VFSS(Videofluoroscopic Swallowing Study).  Patient was alert and cooperative.    Objective: Risks/benefits were reviewed with the patient and family, and consent was obtained. The study was completed with SLP and Radiologist present. The patient was seen in lateral view(s). Textures given included thin liquid, puree consistency, mechanical soft consistency and regular consistency.  Assessment:  VFSS completed in conjunction with Dr Austin.  Baseline imaging characterized by shadowing within larynx and pyriform sinuses (vicinity of anterior commisure and along posterior cricoid).  Pt demonstrated aspiration of puree 1 of 4 presentations.  No aspiration with liquids or solids.  Mild pharyngeal residue with clearance assisted by multiple swallows and liquid wash. Reduced hyolaryngeal movement. Esophageal scan completed with reduced esophageal motility, retrograde movement, delayed esophageal clearance and spasm-like contraction of the esophagus.    Additional information provided by radiologist including \"multilevel degenerative disc disease. There is calcific carotid  Atherosclerosis.  Limited images of the thoracic esophagus demonstrate proximal escape, delayed clearance, and tertiary contractions\"   SLP Findings: Patient presents with mild oropharyngeal dysphagia.   Comments: No coughing or choking demonstrated during this study.  Recommendations: Diet Textures: thin liquid, regular consistency food. Medications should be taken as tolerated   Recommended Strategies: Upright for PO, small bites and sips, double swallow, and alternate liquids and solids. Oral care before breakfast, after all meals and PRN.  " Pt also provided with general pharyngeal strengthening exercises.     Other Recommended Evaluations:  Given pt complaints, history of pneumonia, and shadlowing on xray reducing sensitivity of VFSS consider FEES as additional assessment to fully determine swallow function. If desire, please order.          Patient Active Problem List   Diagnosis   • Primary hypertension   • Chronic coronary artery disease   • Familial hypercholesterolemia   • Mitral valve insufficiency   • Ventricular premature beats   • Ventricular tachycardia   • Acid-fast bacteria present   • DNR (do not resuscitate)   • Chronic diastolic CHF (congestive heart failure) ([facility])   • Asymptomatic bacteriuria   • Iron deficiency anemia   • Bronchiectasis (HCC)   • KINA (mycobacterium avium-intracellulare) ([facility])   • Paroxysmal atrial fibrillation (HCC)   • Anxiety   • Exposure to hepatitis A   • Medicare annual wellness visit, subsequent   • Abdominal pain   • Herpes infection   • Moderate asthma with acute exacerbation   • Bronchitis, acute, with bronchospasm   • Abnormal EKG   • COPD with acute exacerbation    • Fall   • Laceration of left upper extremity   • Multiple skin tears   • Alteration in anticoagulation   • Blind right eye   • Clinical diagnosis of COVID-19   • Pneumonia of right lung due to infectious organism, unspecified part of lung   • COPD (chronic obstructive pulmonary disease) ([facility])   • Dizziness        Past Medical History:   Diagnosis Date   • Aspergillus ([facility])    • Asthma    • Atrial fibrillation ([facility])     chronic   • Atrial flutter ([facility])    • Bronchiectasis ([facility])    • C. 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[facility])    • Dyspnea on exertion    • Fall    • Hyperlipidemia    • Hypertension    • Hyponatremia    • Hypoxia    •  Infectious viral hepatitis     AGE 13   • Left shoulder pain    • Leg swelling    • Lesion of lung    • Malignant hyperthermia due to anesthesia    • Mild tricuspid regurgitation    • MR (mitral regurgitation)     mild   • MVP (mitral valve prolapse)    • Permanent atrial fibrillation (HCC)    • Pneumonia with the fungal infection aspergillosis (HCC) 01/06/2017   • Pneumothorax    • SOB (shortness of breath)    • UTI (urinary tract infection)    • Wheeze     mild        Past Surgical History:   Procedure Laterality Date   • BRONCHOSCOPY N/A 11/12/2016    Procedure: BRONCHOSCOPY WITH FLUORO, BRUSHINGS, BAL, AND BIOPSIES;  Surgeon: Rogelio Tucker MD;  Location: [facility] ENDOSCOPY;  Service:    • BRONCHOSCOPY Bilateral 6/3/2017    Procedure: BRONCHOSCOPY with BAL ;  Surgeon: Sung King MD;  Location: [facility] ENDOSCOPY;  Service:    • BRONCHOSCOPY N/A 12/17/2019    Procedure: BRONCHOSCOPY WITH WASHINGS;  Surgeon: Rogelio Tucker MD;  Location: [facility] ENDOSCOPY;  Service: Pulmonary   • BRONCHOSCOPY N/A 7/15/2022    Procedure: BRONCHOSCOPY;  Surgeon: Rogelio Tucker MD;  Location: [facility] ENDOSCOPY;  Service: Pulmonary;  Laterality: N/A;  Pre: Pneumonia  Post: Pneumonia   • CATARACT EXTRACTION EXTRACAPSULAR W/ INTRAOCULAR LENS IMPLANTATION     • COLONOSCOPY      2013   • D & C WITH SUCTION     • HYSTERECTOMY     • KNEE ARTHROSCOPY Left          Visit Dx:     ICD-10-CM ICD-9-CM   1. Dysphagia, unspecified type  R13.10 787.20            OP SLP Assessment/Plan - 12/27/22 1429        SLP Assessment    Functional Problems Swallowing  -SA    Impact on Function: Swallowing Risk of aspiration;Risk of pneumonia  -SA    Clinical Impression: Swallowing Mild:  -SA    Patient/caregiver participated in establishment of treatment plan and goals --   Discussed possibility of f/u w/ FEES.  Provided HEP for pharyngeal strenght -SA          User Key  (r) = Recorded By, (t) = Taken By, (c) = Cosigned By    Initials Name Provider Type    SA Ambrose,  MS Elisha CCC-SLP Speech and Language Pathologist                 SLP Adult Swallow Evaluation     Row Name 12/27/22 1015       Rehab Evaluation    Document Type evaluation;other (see comments)  VFSS  -SA    Patient Observations alert;cooperative;agree to therapy  -SA    Patient/Family/Caregiver Comments/Observations daughter present for review following study  -SA    Patient Effort good  -SA    Symptoms Noted During/After Treatment none  -SA       General Information    Patient Profile Reviewed yes  -SA    Pertinent History Of Current Problem h/o pna x2, CHF  -SA    Current Method of Nutrition regular textures;thin liquids  -SA    Precautions/Limitations, Vision WFL with corrective lenses  -SA    Precautions/Limitations, Hearing WFL;for purposes of eval  -SA    Prior Level of Function-Communication WFL  -SA    Prior Level of Function-Swallowing no diet consistency restrictions  -SA    Plans/Goals Discussed with patient;agreed upon  -SA    Barriers to Rehab none identified  -SA    Patient's Goals for Discharge --  determine cause for coughing  -SA       Pain    Additional Documentation Pain Scale: Numbers Pre/Post-Treatment (Group)  -SA       Pain Scale: Numbers Pre/Post-Treatment    Pretreatment Pain Rating 0/10 - no pain  -SA    Posttreatment Pain Rating 0/10 - no pain  -SA       MBS/VFSS    Utensils Used spoon;cup;straw  -SA    Consistencies Trialed regular textures;soft to chew textures;mixed consistency;pureed;thin liquids  -SA       MBS/VFSS Interpretation    Oral Prep Phase WFL  -SA    Oral Transit Phase WFL  -SA    Oral Residue WFL  -SA       Initiation of Pharyngeal Swallow    Pharyngeal Phase functional pharyngeal phase of swallowing  -SA    Aspiration During the Swallow pudding/puree;other (see comments);secondary to delayed swallow initiation or mistiming  1/4 trials  -SA    Response to Aspiration No  -SA    Pharyngeal Residue other (see comments)  lateral channels  -SA    Response to Residue cleared  residue with cued swallow;cleared residue with liquid wash  -SA    Attempted Compensatory Maneuvers additional subsequent swallow;alternate liquids/solids  -SA    Response to Attempted Compensatory Maneuvers reduced residue  -SA       Esophageal Phase    Esophageal Phase irregular barium column;esophageal retention with retrograde flow below PES  -SA       SLP Communication to Radiology    Summary Statement VFSS completed in conjunction with Dr Austin.  Baseline imaging characterized by shadowing within larynx and pyriform sinuses.  Pt demonstrated aspiration of puree 1 of 4 presentations.  No aspiration with liquids or solids.  Mild pharyngeal residue with clearance assisted by multiple swallows and liquid wash.  Esophageal scan completed with incomplete timely clearance and intraesophageal backflow.  -SA       SLP Evaluation Clinical Impression    SLP Swallowing Diagnosis mild  -SA    Functional Impact risk of aspiration/pneumonia  -SA       Recommendations    SLP Diet Recommendation regular textures;thin liquids  -SA    Recommended Diagnostics FEES;other (see comments)  d/t h/o R pna and shadowing on vfss  -SA    Recommended Precautions and Strategies upright posture during/after eating;small bites of food and sips of liquid;alternate between small bites of food and sips of liquid;multiple swallows per bite of food  -SA    Oral Care Recommendations Oral Care BID/PRN  -SA    SLP Rec. for Method of Medication Administration as tolerated  -SA          User Key  (r) = Recorded By, (t) = Taken By, (c) = Cosigned By    Initials Name Provider Type    Elisha Carlos MS CCC-SLP Speech and Language Pathologist                               OP SLP Education     Row Name 12/27/22 0148       Education    Barriers to Learning No barriers identified  -SA    Education Provided Described results of evaluation;Patient expressed understanding of evaluation;Family/caregivers expressed understanding of evaluation  -SA     Assessed Learning needs;Learning motivation  -SA    Learning Motivation Strong  -SA    Learning Method Explanation  -SA    Teaching Response Verbalized understanding  -SA    Education Comments Study reviewed and recommendations discussed with pt and daughter  -SA          User Key  (r) = Recorded By, (t) = Taken By, (c) = Cosigned By    Initials Name Effective Dates    Elisha Carlos MS CCC-SLP 06/01/22 -                          Time Calculation:   SLP Start Time: 1015    Therapy Charges for Today     Code Description Service Date Service Provider Modifiers Qty    52816028927 HC ST MOTION FLUORO EVAL SWALLOW 6 12/27/2022 Elisha Mcclendon MS CCC-SLP GN 1                   Elisha Mcclendon MS CCC-SLP  12/27/2022

## 2022-12-29 ENCOUNTER — TRANSCRIBE ORDERS (OUTPATIENT)
Dept: SLEEP MEDICINE | Facility: HOSPITAL | Age: 87
End: 2022-12-29
Payer: MEDICARE

## 2022-12-29 DIAGNOSIS — T17.908D ASPIRATION INTO AIRWAY, SUBSEQUENT ENCOUNTER: Primary | ICD-10-CM

## 2023-01-04 ENCOUNTER — HOSPITAL ENCOUNTER (OUTPATIENT)
Dept: SPEECH THERAPY | Facility: HOSPITAL | Age: 88
Setting detail: THERAPIES SERIES
Discharge: HOME | End: 2023-01-04
Payer: MEDICARE

## 2023-01-04 DIAGNOSIS — R13.12 OROPHARYNGEAL DYSPHAGIA: Primary | ICD-10-CM

## 2023-01-04 PROCEDURE — 92610 EVALUATE SWALLOWING FUNCTION: CPT | Performed by: SPEECH-LANGUAGE PATHOLOGIST

## 2023-01-04 NOTE — THERAPY EVALUATION
Outpatient Speech Language Pathology   Adult Swallow Initial Evaluation  [facility]     Patient Name: Agnieszka Rizzo  : 1930  MRN: [id number]  Today's Date: 2023         Visit Date: 2023   Patient Active Problem List   Diagnosis   • Primary hypertension   • Chronic coronary artery disease   • Familial hypercholesterolemia   • Mitral valve insufficiency   • Ventricular premature beats   • Ventricular tachycardia   • Acid-fast bacteria present   • DNR (do not resuscitate)   • Chronic diastolic CHF (congestive heart failure) (HCC)   • Asymptomatic bacteriuria   • Iron deficiency anemia   • Bronchiectasis (HCC)   • KINA (mycobacterium avium-intracellulare) (HCC)   • Paroxysmal atrial fibrillation (HCC)   • Anxiety   • Exposure to hepatitis A   • Medicare annual wellness visit, subsequent   • Abdominal pain   • Herpes infection   • Moderate asthma with acute exacerbation   • Bronchitis, acute, with bronchospasm   • Abnormal EKG   • COPD with acute exacerbation    • Fall   • Laceration of left upper extremity   • Multiple skin tears   • Alteration in anticoagulation   • Blind right eye   • Clinical diagnosis of COVID-19   • Pneumonia of right lung due to infectious organism, unspecified part of lung   • COPD (chronic obstructive pulmonary disease) (HCC)   • Dizziness        Past Medical History:   Diagnosis Date   • Aspergillus (HCC)    • Asthma    • Atrial fibrillation (HCC)     chronic   • Atrial flutter (HCC)    • Bronchiectasis (HCC)    • C. difficile diarrhea 2017   • CAD (coronary artery disease)     nonobstructive   • Chronic diastolic CHF (congestive heart failure) (HCC)    • Colitis    • COPD (chronic obstructive pulmonary disease) (HCC)    • Cough    • Cryoglobulinemia (HCC)    • Dyspnea on exertion    • Fall    • Hyperlipidemia    • Hypertension    • Hyponatremia    • Hypoxia    • Infectious viral hepatitis     AGE 13   • Left shoulder pain    • Leg swelling    • Lesion of lung    •  Malignant hyperthermia due to anesthesia    • Mild tricuspid regurgitation    • MR (mitral regurgitation)     mild   • MVP (mitral valve prolapse)    • Permanent atrial fibrillation (HCC)    • Pneumonia with the fungal infection aspergillosis (HCC) 01/06/2017   • Pneumothorax    • SOB (shortness of breath)    • UTI (urinary tract infection)    • Wheeze     mild        Past Surgical History:   Procedure Laterality Date   • BRONCHOSCOPY N/A 11/12/2016    Procedure: BRONCHOSCOPY WITH FLUORO, BRUSHINGS, BAL, AND BIOPSIES;  Surgeon: Rogelio Tucker MD;  Location: [facility] ENDOSCOPY;  Service:    • BRONCHOSCOPY Bilateral 6/3/2017    Procedure: BRONCHOSCOPY with BAL ;  Surgeon: Sung King MD;  Location: [facility] ENDOSCOPY;  Service:    • BRONCHOSCOPY N/A 12/17/2019    Procedure: BRONCHOSCOPY WITH WASHINGS;  Surgeon: Rogelio Tucker MD;  Location: [facility] ENDOSCOPY;  Service: Pulmonary   • BRONCHOSCOPY N/A 7/15/2022    Procedure: BRONCHOSCOPY;  Surgeon: Rogelio Tucker MD;  Location: [facility] ENDOSCOPY;  Service: Pulmonary;  Laterality: N/A;  Pre: Pneumonia  Post: Pneumonia   • CATARACT EXTRACTION EXTRACAPSULAR W/ INTRAOCULAR LENS IMPLANTATION     • COLONOSCOPY      2013   • D & C WITH SUCTION     • HYSTERECTOMY     • KNEE ARTHROSCOPY Left          Visit Dx:     ICD-10-CM ICD-9-CM   1. Oropharyngeal dysphagia  R13.12 787.22            OP SLP Assessment/Plan - 01/04/23 1127        SLP Assessment    Functional Problems Swallowing  -KA    Impact on Function: Swallowing Risk of aspiration;Risk of pneumonia  -KA    Clinical Impression: Swallowing WNL  -KA    Please refer to paper survey for additional self-reported information Yes  -KA    Please refer to items scanned into chart for additional diagnostic informaiton and handouts as provided by clinician Yes  -KA          User Key  (r) = Recorded By, (t) = Taken By, (c) = Cosigned By    Initials Name Provider Type    Mohit Siddiqui MA,CCC-SLP Speech and Language Pathologist                  SLP Adult Swallow Evaluation     Row Name 01/04/23 1100       Rehab Evaluation    Document Type evaluation  -KA    Subjective Information no complaints  -KA    Patient Observations alert;cooperative  -KA    Patient Effort good  -KA    Symptoms Noted During/After Treatment none  -KA       General Information    Patient Profile Reviewed yes  -KA    Pertinent History Of Current Problem Patient referred today for outpatient dysphagia therapy for education on results of recent VFSS on 12/27. Patient had recent VFSS on 12/27 where pt had one episode of aspiration on 1/4 trials of puree due to mistiming, no other pen/asp on VFSS. Patient has had recent history of two PNAs this year and has history of bronchiectasis. Patient reports she has chronic cough. She reports she always develops PNA if she is around her shrubs due to mold allergy and she now avoids being around her shrubs. Overall patient denies dysphagia symptoms when eating and drinking. She reported she did have one episode in early December when she \"couldn't catch breath x1.\"  -KA    Current Method of Nutrition regular textures;thin liquids  -KA    Precautions/Limitations, Vision WFL  -KA    Precautions/Limitations, Hearing WFL;for purposes of eval  -KA    Prior Level of Function-Communication WFL  -KA    Prior Level of Function-Swallowing no diet consistency restrictions;safe, efficient swallowing in all situations;regular textures;thin liquids  -KA    Plans/Goals Discussed with patient  -KA    Barriers to Rehab none identified  -KA    Patient's Goals for Discharge no concerns voiced  -KA    Family Goals for Discharge family did not state  -KA       Oral Motor Structure and Function    Dentition Assessment natural, present and adequate  -KA    Secretion Management WNL/WFL  -KA    Mucosal Quality moist, healthy  -KA    Volitional Swallow WFL  -KA    Volitional Cough WFL  -KA       Oral Musculature and Cranial Nerve Assessment    Oral Motor General Assessment  WFL  -KA       General Eating/Swallowing Observations    Respiratory Support Currently in Use room air  -KA    Eating/Swallowing Skills self-fed  -KA    Positioning During Eating upright in chair  -KA    Utensils Used spoon;cup  -KA    Consistencies Trialed regular textures;soft to chew textures;mixed consistency;thin liquids  -KA       Clinical Swallow Eval    Clinical Swallow Evaluation Summary Patient demonstrated no immediate overt s/s of pen/asp with thins, mechanical soft mixed consistency and regular solids. X1 delayed cough observed after PO when patient was talking, do not feel related to swallow. SLP reviewed results of VFSS with patient and daughter including watching images. Overall patient has a strong, and age appropriate swallow. Patient had one episode of aspiration out of 4 with puree due to mistiming, no other pen/asp during VFSS with adequate laryngeal vestibule closure. Reviewed safe swallow precautions: small bites and sips, slow rate, eliminate distractions and do not talk during PO. Discussed clinical signs of aspiration and reviewed with pt and daughter. Patient and daugther voiced understanding of education completed. No further education/therapy indicated at this time. Discussed pt can be referred back as indicated and repeating instrumental such as FEES can also be ordered as indicated in the future.  -KA       SLP Evaluation Clinical Impression    SLP Swallowing Diagnosis swallow WFL/no suspected pharyngeal impairment  -KA    Functional Impact risk of aspiration/pneumonia  -    Swallow Criteria for Skilled Therapeutic Interventions Met no problems identified which require skilled intervention;other (see comments)  all education completed  -       Recommendations    Therapy Frequency (Swallow) evaluation only  -    SLP Diet Recommendation regular textures;thin liquids  -KA    Recommended Precautions and Strategies upright posture during/after eating;small bites of food and sips of  liquid  -KA    Oral Care Recommendations Oral Care BID/PRN  -EM    SLP Rec. for Method of Medication Administration meds whole;as tolerated  -EM    Monitor for Signs of Aspiration yes;notify SLP if any concerns  -EM    Anticipated Discharge Disposition (SLP) home with assist  -EM          User Key  (r) = Recorded By, (t) = Taken By, (c) = Cosigned By    Initials Name Provider Type    Mohit Siddiqui MA,CCC-SLP Speech and Language Pathologist                               OP SLP Education     Row Name 01/04/23 1127       Education    Barriers to Learning No barriers identified  -EM    Education Provided Described results of evaluation;Patient expressed understanding of evaluation;Family/caregivers expressed understanding of evaluation  -EM    Assessed Learning needs;Learning motivation  -EM    Learning Motivation Strong  -EM    Learning Method Explanation;Written materials;Teach back  -EM    Teaching Response Verbalized understanding  -EM          User Key  (r) = Recorded By, (t) = Taken By, (c) = Cosigned By    Initials Name Effective Dates    Mohit Siddiqui MA,CCC-SLP 06/02/22 -                          Time Calculation:   SLP Start Time: 1015  SLP Stop Time: 1053  SLP Time Calculation (min): 38 min  Untimed Charges  SLP Eval/Re-eval : ST Eval Oral Pharyng Swallow - 62793  [id number] Eval Oral Pharyng Swallow Minutes: 38  Total Minutes  Untimed Charges Total Minutes: 38   Total Minutes: 38    Therapy Charges for Today     Code Description Service Date Service Provider Modifiers Qty    [id number]  ST EVAL ORAL PHARYNG SWALLOW 3 1/4/2023 Mohit Epps MA,CCC-SLP GN 1                   Mohit Epps MA,CCC-SLP  1/4/2023

## 2023-01-05 ENCOUNTER — OFFICE VISIT (OUTPATIENT)
Dept: CARDIOLOGY | Facility: CLINIC | Age: 88
End: 2023-01-05
Payer: MEDICARE

## 2023-01-05 VITALS
HEART RATE: 94 BPM | SYSTOLIC BLOOD PRESSURE: 114 MMHG | HEIGHT: 63 IN | WEIGHT: 124 LBS | BODY MASS INDEX: 21.97 KG/M2 | DIASTOLIC BLOOD PRESSURE: 70 MMHG

## 2023-01-05 DIAGNOSIS — I10 BENIGN ESSENTIAL HYPERTENSION: ICD-10-CM

## 2023-01-05 DIAGNOSIS — I25.10 CHRONIC CORONARY ARTERY DISEASE: ICD-10-CM

## 2023-01-05 DIAGNOSIS — I49.3 VENTRICULAR PREMATURE BEATS: ICD-10-CM

## 2023-01-05 DIAGNOSIS — I48.19 PERSISTENT ATRIAL FIBRILLATION: Primary | ICD-10-CM

## 2023-01-05 PROCEDURE — 93000 ELECTROCARDIOGRAM COMPLETE: CPT | Performed by: INTERNAL MEDICINE

## 2023-01-05 PROCEDURE — 99214 OFFICE O/P EST MOD 30 MIN: CPT | Performed by: INTERNAL MEDICINE

## 2023-01-05 RX ORDER — AZITHROMYCIN 250 MG/1
250 TABLET, FILM COATED ORAL DAILY
COMMUNITY

## 2023-01-05 NOTE — PROGRESS NOTES
CARDIOLOGY    Marcie Robbins MD    ENCOUNTER DATE:  01/05/2023    Agnieszka Rizzo / 89 y.o. / female        CHIEF COMPLAINT / REASON FOR OFFICE VISIT     Palpitations      HISTORY OF PRESENT ILLNESS       HPI    Agnieszka Rizzo is a 89 y.o. female     This is a lady I met while she was hospitalized in November 2016. She has a significant pulmonary history and was having a bronchoscopy and unfortunately developed a pneumothorax. She was found to be in rate -controlled atrial fibrillation. She had previously been seen by Dr. Ana Goodrich for history of hypertension, hyperlipidemia, mild mitral valve prolapse and nonobstructive coronary artery disease diagnosed by heart catheterization in 2007.      Echocardiogram November 15, 2016:  All left ventricular wall segments contract normally.  Left ventricular function is normal. Estimated EF = 58%.  Left atrial cavity size is moderately dilated.  Mild tricuspid valve regurgitation is present.  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 They felt she would do well on oral anticoagulation and I started her on Pradaxa this was subsequently changed to warfarin. I did not do a stress test while she was hospitalized because she was wheezing and I did not fill comfortable giving her Lexiscan at that time and I did not when he is dobutamine because of her atrial fibrillation.       She was able to have a stress as an outpt on 12/19/16 and this was normal. She continued to complain of dyspnea on exertion and so I had her set up for a cardioversion. She had a couple of hospitalizations in the meantime for respiratory issues. While she was in the hospital in January 2017, I attempted to cardiovert her. I shocked her 3 times that she did not remain in sinus rhythm. I spoke with her family and we decided to continue with rate control and  anticoagulation.      She was hospitalized in 02/2017. She had started Voriconazole for treatment of pulmonary aspergillosis by Dr. Tucker. She became very nauseous and sick, and threw up for an entire day. She came into the hospital confused and weak. Her sodium was at 109. She was found to have a left clavicle fracture from a fall. She was discharged to [facility], where she has been. Unfortunately, as a result of the treatment for the aspergillosis, she developed C. difficile and was rehospitalized for treatment of this in March 2017. As a part of his treatment she did receive IV fluids. She was discharged but then I readmitted her on March 22 for IV diuresis. She was volume overloaded and in diastolic heart failure. She was back in the hospital on April 2 with more C. difficile colitis. Again she was treated with IV fluids and became volume overloaded. I diuresed her in the hospital and she was only mildly volume overloaded at discharge.     She was hospitalized from May 31 of June 9 2017.  While there she had some rapid ventricular response due to her atrial fibrillation and being sick with community-acquired pneumonia.  Some changes were made to her medication but in the end she was discharged on verapamil 360 mg once a day and digoxin 0.125 mg a day.     She was hospitalized in December 2019 with a COPD exacerbation/viral bronchitis.      Compared to how she has looked in the past she really looks fantastic, however she has been complaining of some tachycardia and palpitations.  Today's EKG showed AFib with a controlled rate at 94 beats per minute, but I want to put a Zio patch on her better evaluation of her heart rate.          REVIEW OF SYSTEMS     Review of Systems   Constitutional: Negative for chills, fever, weight gain and weight loss.   Cardiovascular: Negative for leg swelling.   Respiratory: Positive for cough and wheezing. Negative for snoring.    Hematologic/Lymphatic: Negative for bleeding  problem. Does not bruise/bleed easily.   Skin: Negative for color change.   Musculoskeletal: Negative for falls, joint pain and myalgias.   Gastrointestinal: Negative for melena.   Genitourinary: Negative for hematuria.   Neurological: Negative for excessive daytime sleepiness.   Psychiatric/Behavioral: Negative for depression. The patient is not nervous/anxious.          VITAL SIGNS     Visit Vitals  /70   Pulse 94   Ht 160 cm (63\")   Wt 56.2 kg (124 lb)   BMI 21.97 kg/m²         Wt Readings from Last 3 Encounters:   01/05/23 56.2 kg (124 lb)   09/09/22 55.2 kg (121 lb 12.8 oz)   08/23/22 53.1 kg (117 lb)     Body mass index is 21.97 kg/m².      PHYSICAL EXAMINATION     Constitutional:       General: Not in acute distress.  Neck:      Vascular: No carotid bruit or JVD.   Pulmonary:      Effort: Pulmonary effort is normal.      Breath sounds: Normal breath sounds.   Cardiovascular:      Normal rate. Irregularly irregular rhythm.      Murmurs: There is no murmur.   Psychiatric:         Mood and Affect: Mood and affect normal.           REVIEWED DATA       ECG 12 Lead    Date/Time: 1/5/2023 11:55 AM  Performed by: Marcie Robbins MD  Authorized by: Marcie Robbins MD   Comparison: compared with previous ECG from 8/18/2022  Rhythm: atrial fibrillation  BPM: 94  Conduction: conduction normal  ST Segments: ST segments normal  T Waves: T waves normal    Clinical impression: abnormal EKG                  Lab Results   Component Value Date    GLUCOSE 93 07/19/2022    BUN 14 07/19/2022    CREATININE 0.65 07/19/2022    EGFRIFNONA 67 08/27/2021    EGFRIFAFRI 87 11/14/2019    BCR 21.5 07/19/2022    K 3.6 07/19/2022    CO2 30.0 (H) 07/19/2022    CALCIUM 8.9 07/19/2022    ALBUMIN 3.00 (L) 07/19/2022    AST 17 07/09/2022    ALT 12 07/09/2022       ASSESSMENT & PLAN      Diagnosis Plan   1. Persistent atrial fibrillation (HCC)  Holter Monitor - 72 Hour Up To 15 Days      2. Benign essential hypertension  Holter Monitor -  72 Hour Up To 15 Days      3. Chronic coronary artery disease  Holter Monitor - 72 Hour Up To 15 Days      4. Ventricular premature beats  Holter Monitor - 72 Hour Up To 15 Days          1. Atrial fibrillation. She failed cardioversion in January 2017. She has a CHADS2-Vasc score of 5.  She has been having problems with bleeding and bruising and her INR has been bouncing around.    This is better since being on Eliquis 2.5 mg twice a day.  Continue current dose.     2. Chronic diastolic heart failure. She is on torsemide.  She is euvolemic.     3. Dyspnea on exertion. Multifactorial.  Much improved.  She is exercising regularly.     4. Mitral valve prolapse with very mild mitral regurgitation.     5. Mild tricuspid regurgitation without pulmonary hypertension.     6. Nonobstructive coronary artery disease diagnosed by catheter in 2007. She has noted coronary artery calcification on CT scans. Nuclear stress 12/16 was normal.  he has had some chest discomfort so I am point to repeat the stress test.     7. Hypertension. Her blood pressure is controlled.  She monitors her blood pressure at home.  She gave me some of her numbers which seems to be very well controlled.     8. Hyperlipidemia. atorvastatin.      9. Hyponatremia. Stable.     10. C. difficile diarrhea. 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  She has completed her antibiotic regimen.      She is complaining of palpitations.  She will have a Zio patch placed and I will review it when it is available to see if she is having some rapid A. Fiib.if she is, I will make adjustments to her medications.  I am not making any changes at today's visit    Orders Placed This Encounter   Procedures   • Holter Monitor - 72 Hour Up To 15 Days     Standing Status:   Future     Number of Occurrences:   1     Standing Expiration Date:   1/5/2024     Order Specific Question:   Reason for exam?     Answer:    Palpitations     Order Specific Question:   Reason for exam?     Answer:   AFib     Order Specific Question:   Release to patient     Answer:   Routine Release     Order Specific Question:   How many days is the patient to wear the monitor?     Answer:   14   • ECG 12 Lead     This order was created via procedure documentation     Order Specific Question:   Release to patient     Answer:   Routine Release         MEDICATIONS         Discharge Medications          Accurate as of January 5, 2023 11:56 AM. If you have any questions, ask your nurse or doctor.            Continue These Medications      Instructions Start Date   acetaminophen 325 MG tablet  Commonly known as: TYLENOL   650 mg, Oral, Every 4 Hours PRN      albuterol sulfate  (90 Base) MCG/ACT inhaler  Commonly known as: PROVENTIL HFA;VENTOLIN HFA;PROAIR HFA   2 puffs, Inhalation, Every 6 Hours PRN      apixaban 2.5 MG tablet tablet  Commonly known as: ELIQUIS   2.5 mg, Oral, Every 12 Hours Scheduled      atorvastatin 40 MG tablet  Commonly known as: LIPITOR   40 mg, Oral, Daily      azithromycin 250 MG tablet  Commonly known as: ZITHROMAX   250 mg, Oral, Daily      benzonatate 200 MG capsule  Commonly known as: TESSALON   200 mg, Oral, 3 Times Daily PRN      calcium carbonate 600 MG tablet  Commonly known as: OS-EVIN   600 mg, Oral, Daily, With vitamin D      cetirizine 10 MG tablet  Commonly known as: zyrTEC   10 mg, Oral, Daily      cholecalciferol 25 MCG (1000 UT) tablet  Commonly known as: VITAMIN D3   1,000 Units, Oral, Daily      citalopram 10 MG tablet  Commonly known as: CeleXA   10 mg, Oral, Daily      ezetimibe 10 MG tablet  Commonly known as: ZETIA   10 mg, Oral, Daily      FeroSul 325 (65 FE) MG tablet  Generic drug: ferrous sulfate   TAKE ONE TABLET BY MOUTH DAILY WITH BREAKFAST      Florajen Acidophilus capsule   1 tablet, Oral, Daily      fluticasone 50 MCG/ACT nasal spray  Commonly known as: FLONASE   2 sprays, Nasal, Nightly PRN       fluticasone-salmeterol 230-21 MCG/ACT inhaler  Commonly known as: ADVAIR HFA   2 puffs, Inhalation, 2 Times Daily - RT      glucosamine-chondroitin 500-400 MG capsule capsule   1 capsule, Oral, Daily      guaiFENesin 600 MG 12 hr tablet  Commonly known as: MUCINEX   600 mg, Oral, Every 12 Hours Scheduled      ipratropium-albuterol 0.5-2.5 mg/3 ml nebulizer  Commonly known as: DUO-NEB   3 mL, Nebulization, 2 Times Daily, SOB/WHEEZING      losartan 50 MG tablet  Commonly known as: COZAAR   100 mg, Oral, Daily      metoprolol tartrate 25 MG tablet  Commonly known as: LOPRESSOR   25 mg, Oral, Every 12 Hours Scheduled      multivitamin with minerals tablet tablet   1 tablet, Oral, 2 Times Daily      O2  Commonly known as: OXYGEN   3 L/min, Inhalation, Nightly      potassium chloride 10 MEQ CR capsule  Commonly known as: MICRO-K   10 mEq, Oral, Daily      sodium chloride 0.9 % nebulizer solution   3 mL, Nebulization, 2 Times Daily      torsemide 20 MG tablet  Commonly known as: DEMADEX   20 mg, Oral, 2 Times Daily      VITAMIN B COMPLEX PO   1 tablet, Oral, Daily, TAKES VIT B12 1000MCG DAILY               Marcie Robbins MD  01/05/23  11:56 EST    Part of this note may be an electronic transcription/translation of spoken language to printed text using the Dragon dictation system.

## 2023-01-11 ENCOUNTER — APPOINTMENT (OUTPATIENT)
Dept: SPEECH THERAPY | Facility: HOSPITAL | Age: 88
End: 2023-01-11
Payer: MEDICARE

## 2023-01-18 ENCOUNTER — APPOINTMENT (OUTPATIENT)
Dept: SPEECH THERAPY | Facility: HOSPITAL | Age: 88
End: 2023-01-18
Payer: MEDICARE

## 2023-01-25 ENCOUNTER — APPOINTMENT (OUTPATIENT)
Dept: SPEECH THERAPY | Facility: HOSPITAL | Age: 88
End: 2023-01-25
Payer: MEDICARE

## 2023-01-25 ENCOUNTER — TELEPHONE (OUTPATIENT)
Dept: CARDIOLOGY | Facility: CLINIC | Age: 88
End: 2023-01-25

## 2023-01-25 NOTE — TELEPHONE ENCOUNTER
Let her know that I got the results back on her monitor.  She is in atrial fibrillation the entire time of the tracing which is not new.  She overall has a normal heart rate average but sometimes her heart rate is getting fast and not seem to correlate with her symptoms.  I like her to try to increase the metoprolol to tartrate to 37.5 mg twice daily.    Schedule a follow-up with a nurse practitioner next month.

## 2023-01-25 NOTE — TELEPHONE ENCOUNTER
Notified patient of results/recommendations. Patient verbalized understanding. FU appt scheduled.     Kasia Shore RN  Triage [facility] [place]

## 2023-02-20 ENCOUNTER — OFFICE VISIT (OUTPATIENT)
Dept: CARDIOLOGY | Facility: CLINIC | Age: 88
End: 2023-02-20
Payer: MEDICARE

## 2023-02-20 VITALS
DIASTOLIC BLOOD PRESSURE: 73 MMHG | SYSTOLIC BLOOD PRESSURE: 127 MMHG | OXYGEN SATURATION: 93 % | HEIGHT: 63 IN | WEIGHT: 130 LBS | BODY MASS INDEX: 23.04 KG/M2 | HEART RATE: 93 BPM

## 2023-02-20 DIAGNOSIS — I34.0 NONRHEUMATIC MITRAL VALVE REGURGITATION: ICD-10-CM

## 2023-02-20 DIAGNOSIS — I50.32 CHRONIC DIASTOLIC CHF (CONGESTIVE HEART FAILURE): ICD-10-CM

## 2023-02-20 DIAGNOSIS — I48.0 PAROXYSMAL ATRIAL FIBRILLATION: ICD-10-CM

## 2023-02-20 DIAGNOSIS — J41.0 SIMPLE CHRONIC BRONCHITIS: ICD-10-CM

## 2023-02-20 DIAGNOSIS — J47.9 BRONCHIECTASIS WITHOUT COMPLICATION: ICD-10-CM

## 2023-02-20 DIAGNOSIS — E78.01 FAMILIAL HYPERCHOLESTEROLEMIA: ICD-10-CM

## 2023-02-20 DIAGNOSIS — I10 PRIMARY HYPERTENSION: ICD-10-CM

## 2023-02-20 DIAGNOSIS — I25.10 CHRONIC CORONARY ARTERY DISEASE: Primary | ICD-10-CM

## 2023-02-20 PROBLEM — J44.1 COPD WITH ACUTE EXACERBATION: Status: RESOLVED | Noted: 2019-12-16 | Resolved: 2023-02-20

## 2023-02-20 PROCEDURE — 99214 OFFICE O/P EST MOD 30 MIN: CPT | Performed by: NURSE PRACTITIONER

## 2023-02-20 PROCEDURE — 93000 ELECTROCARDIOGRAM COMPLETE: CPT | Performed by: NURSE PRACTITIONER

## 2023-02-20 RX ORDER — METOPROLOL TARTRATE 50 MG/1
50 TABLET, FILM COATED ORAL EVERY 12 HOURS SCHEDULED
Qty: 180 TABLET | Refills: 3
Start: 2023-02-20

## 2023-02-20 NOTE — PROGRESS NOTES
Date of Office Visit: 2023  Encounter Provider: PJ West  Place of Service: [facility] CARDIOLOGY  Patient Name: Agnieszka Rizzo  :1930    Chief Complaint   Patient presents with   • Follow-up   • Atrial Fibrillation   :     HPI: Agnieszka Rizzo is a 89 y.o. female who is a patient of Dr. Robbins and is new to me today.  She has a history of pulmonary disease and in the past developed a pneumothorax.  She was found to be in A-fib at that time and it was rate controlled.  She has previously followed Dr. Ana Goodrich at [place] for hypertension, hyperlipidemia, mitral prolapse and nonobstructive CAD on cardiac cath in .  When she was first diagnosed with A-fib she was initially put on Pradaxa but hematology switch her to Coumadin.  She eventually had this stress test that was negative for ischemia in 2016.  She has had an attempted cardioversion in the past but did not maintain sinus rhythm.  She is also been treated for pneumonia several times by Dr. Tucker and aspergillosis pneumonia.  She ended up having some issues with bleeding and was switched over to Eliquis 2.5 mg twice a day.  When she was in the office a month ago we want to evaluate the control of her heart rate and we put a Zio patch on her.  She was having some rapid rates of her A-fib and we increased her metoprolol to 37.5 mg twice a day.    She comes in for follow-up today.  She is still short of breath with exertion and having palpitations.  She denies any tightness or pressure in the chest.  She has been tracking her heart rate and she sees that there is times its been in the 200s.  She also has been wearing her oxygen some during the day when she is sleeping.  Previous testing and notes have been reviewed by me.   Past Medical History:   Diagnosis Date   • Aspergillus (HCC)    • Asthma    • Atrial fibrillation (HCC)     chronic   • Atrial flutter (HCC)    • Bronchiectasis (HCC)    • C. difficile  diarrhea 03/11/2017   • CAD (coronary artery disease)     nonobstructive   • Chronic diastolic CHF (congestive heart failure) (HCC)    • Colitis    • COPD (chronic obstructive pulmonary disease) (HCC)    • Cough    • Cryoglobulinemia (HCC)    • Dyspnea on exertion    • Fall    • Hyperlipidemia    • Hypertension    • Hyponatremia    • Hypoxia    • Infectious viral hepatitis     AGE 13   • Left shoulder pain    • Leg swelling    • Lesion of lung    • Malignant hyperthermia due to anesthesia    • Mild tricuspid regurgitation    • MR (mitral regurgitation)     mild   • MVP (mitral valve prolapse)    • Permanent atrial fibrillation (HCC)    • Pneumonia with the fungal infection aspergillosis (HCC) 01/06/2017   • Pneumothorax    • SOB (shortness of breath)    • UTI (urinary tract infection)    • Wheeze     mild       Past Surgical History:   Procedure Laterality Date   • BRONCHOSCOPY N/A 11/12/2016    Procedure: BRONCHOSCOPY WITH FLUORO, BRUSHINGS, BAL, AND BIOPSIES;  Surgeon: Rogelio Tucker MD;  Location: [facility] ENDOSCOPY;  Service:    • BRONCHOSCOPY Bilateral 6/3/2017    Procedure: BRONCHOSCOPY with BAL ;  Surgeon: Sung King MD;  Location: [facility] ENDOSCOPY;  Service:    • BRONCHOSCOPY N/A 12/17/2019    Procedure: BRONCHOSCOPY WITH WASHINGS;  Surgeon: Rogelio Tucker MD;  Location: [facility] ENDOSCOPY;  Service: Pulmonary   • BRONCHOSCOPY N/A 7/15/2022    Procedure: BRONCHOSCOPY;  Surgeon: Rogelio Tucker MD;  Location: [facility] ENDOSCOPY;  Service: Pulmonary;  Laterality: N/A;  Pre: Pneumonia  Post: Pneumonia   • CATARACT EXTRACTION EXTRACAPSULAR W/ INTRAOCULAR LENS IMPLANTATION     • COLONOSCOPY      2013   • D & C WITH SUCTION     • HYSTERECTOMY     • KNEE ARTHROSCOPY Left        Social History     Socioeconomic History   • Marital status:    Tobacco Use   • Smoking status: Never   • Smokeless tobacco: Never   • Tobacco comments:     caffiene daily   Vaping Use   • Vaping Use: Never used   Substance and Sexual Activity    • Alcohol use: Yes     Alcohol/week: 2.0 standard drinks     Types: 1 Glasses of wine, 1 Shots of liquor per week     Comment: occasional/  Daily caffeine use   • Drug use: No   • Sexual activity: Yes     Partners: Male       Family History   Problem Relation Age of Onset   • Hypertension Mother    • Stroke Mother    • Hypertension Father    • Cancer Son    • Cancer Brother        Review of Systems   Constitutional: Negative for diaphoresis and malaise/fatigue.   Cardiovascular: Positive for palpitations. Negative for chest pain, claudication, dyspnea on exertion, irregular heartbeat, leg swelling, near-syncope, orthopnea, paroxysmal nocturnal dyspnea and syncope.   Respiratory: Positive for shortness of breath. Negative for cough and sleep disturbances due to breathing.    Musculoskeletal: Negative for falls.   Neurological: Negative for dizziness and weakness.   Psychiatric/Behavioral: Negative for altered mental status and substance abuse.       Allergies   Allergen Reactions   • Amlodipine Besylate Swelling   • Aspirin GI Intolerance     Caused bleeding ulcers   • Bactrim [Sulfamethoxazole-Trimethoprim] Nausea And Vomiting   • Erythromycin Unknown (See Comments)     Patient does not recall type of reaction.   • Levaquin [Levofloxacin] Unknown (See Comments)     Patient does not recall what type of reaction.   • Nitrofurantoin Nausea Only and Nausea And Vomiting   • Ramipril Other (See Comments)     Cough         Current Outpatient Medications:   •  acetaminophen (TYLENOL) 325 MG tablet, Take 650 mg by mouth Every 4 (Four) Hours As Needed for Moderate Pain ., Disp: , Rfl:   •  albuterol sulfate  (90 Base) MCG/ACT inhaler, Inhale 2 puffs Every 6 (Six) Hours As Needed for Wheezing., Disp: , Rfl:   •  apixaban (ELIQUIS) 2.5 MG tablet tablet, Take 1 tablet by mouth Every 12 (Twelve) Hours., Disp: 14 tablet, Rfl: 0  •  atorvastatin (LIPITOR) 40 MG tablet, Take 1 tablet by mouth Daily., Disp: 90 tablet, Rfl:  3  •  azithromycin (ZITHROMAX) 250 MG tablet, Take 250 mg by mouth Daily., Disp: , Rfl:   •  B Complex Vitamins (VITAMIN B COMPLEX PO), Take 1 tablet by mouth Daily. TAKES VIT B12 1000MCG DAILY, Disp: , Rfl:   •  benzonatate (TESSALON) 200 MG capsule, Take 1 capsule by mouth 3 (Three) Times a Day As Needed for Cough., Disp: 60 capsule, Rfl: 0  •  calcium carbonate (OS-EVIN) 600 MG tablet, Take 600 mg by mouth Daily. With vitamin D, Disp: , Rfl:   •  cetirizine (zyrTEC) 10 MG tablet, Take 10 mg by mouth Daily., Disp: , Rfl:   •  cholecalciferol (VITAMIN D3) 25 MCG (1000 UT) tablet, Take 1,000 Units by mouth Daily., Disp: , Rfl:   •  citalopram (CeleXA) 10 MG tablet, Take 1 tablet by mouth Daily., Disp: 90 tablet, Rfl: 2  •  ezetimibe (ZETIA) 10 MG tablet, Take 1 tablet by mouth Daily., Disp: 90 tablet, Rfl: 3  •  FeroSul 325 (65 Fe) MG tablet, TAKE ONE TABLET BY MOUTH DAILY WITH BREAKFAST, Disp: 90 tablet, Rfl: 0  •  fluticasone (FLONASE) 50 MCG/ACT nasal spray, 2 sprays into the nostril(s) as directed by provider At Night As Needed for Allergies., Disp: , Rfl:   •  fluticasone-salmeterol (ADVAIR HFA) 230-21 MCG/ACT inhaler, Inhale 2 puffs 2 (Two) Times a Day., Disp: , Rfl:   •  glucosamine-chondroitin 500-400 MG capsule capsule, Take 1 capsule by mouth Daily., Disp: , Rfl:   •  guaiFENesin (MUCINEX) 600 MG 12 hr tablet, Take 1 tablet by mouth Every 12 (Twelve) Hours., Disp: 30 tablet, Rfl: 0  •  ipratropium-albuterol (DUO-NEB) 0.5-2.5 mg/3 ml nebulizer, Take 3 mL by nebulization 2 (Two) Times a Day. SOB/WHEEZING, Disp: , Rfl:   •  Lactobacillus (FLORAJEN ACIDOPHILUS) capsule, Take 1 tablet by mouth Daily., Disp: , Rfl:   •  losartan (COZAAR) 50 MG tablet, Take 100 mg by mouth Daily., Disp: , Rfl:   •  metoprolol tartrate (LOPRESSOR) 50 MG tablet, Take 1 tablet by mouth Every 12 (Twelve) Hours., Disp: 180 tablet, Rfl: 3  •  Multiple Vitamins-Minerals (PRESERVISION AREDS PO), Take 1 tablet by mouth 2 (Two) Times a  "Day., Disp: , Rfl:   •  O2 (OXYGEN), Inhale 3 L/min Every Night., Disp: , Rfl:   •  potassium chloride (MICRO-K) 10 MEQ CR capsule, Take 1 capsule by mouth Daily., Disp: 90 capsule, Rfl: 3  •  sodium chloride 0.9 % nebulizer solution, Take 3 mL by nebulization 2 (Two) Times a Day., Disp: , Rfl:   •  torsemide (DEMADEX) 20 MG tablet, Take 1 tablet by mouth 2 (Two) Times a Day., Disp: 180 tablet, Rfl: 3      Objective:     Vitals:    02/20/23 1009   BP: 127/73   Pulse: 93   SpO2: 93%   Weight: 59 kg (130 lb)   Height: 160 cm (63\")     Body mass index is 23.03 kg/m².    PHYSICAL EXAM:    Constitutional:       General: Not in acute distress.     Appearance: Normal appearance. Well-developed.   Eyes:      Pupils: Pupils are equal, round, and reactive to light.   HENT:      Head: Normocephalic.   Neck:      Vascular: No carotid bruit or JVD.   Pulmonary:      Effort: Pulmonary effort is normal. No tachypnea.      Breath sounds: Normal breath sounds. No wheezing. No rales.   Cardiovascular:      Normal rate. Irregularly irregular rhythm.      No gallop.   Pulses:     Intact distal pulses.   Edema:     Peripheral edema absent.   Abdominal:      General: Bowel sounds are normal.      Palpations: Abdomen is soft.      Tenderness: There is no abdominal tenderness.   Musculoskeletal: Normal range of motion.      Cervical back: Normal range of motion and neck supple. No edema. Skin:     General: Skin is warm and dry.   Neurological:      Mental Status: Alert and oriented to person, place, and time.           ECG 12 Lead    Date/Time: 2/20/2023 10:21 AM  Performed by: Mayela Weiss APRN  Authorized by: Mayela Weiss APRN   Comparison: compared with previous ECG from 1/5/2023  Similar to previous ECG  Rhythm: atrial fibrillation  Rate: normal  QRS axis: normal  Other findings: non-specific ST-T wave changes    Clinical impression: abnormal EKG              Assessment:       Diagnosis Plan   1. Chronic coronary artery " disease  ECG 12 Lead      2. Chronic diastolic CHF (congestive heart failure) (HCC)  ECG 12 Lead      3. Familial hypercholesterolemia        4. Nonrheumatic mitral valve regurgitation        5. Paroxysmal atrial fibrillation (HCC)  ECG 12 Lead      6. Primary hypertension        7. Simple chronic bronchitis (HCC)        8. Bronchiectasis without complication (HCC)          Orders Placed This Encounter   Procedures   • ECG 12 Lead     This order was created via procedure documentation     Order Specific Question:   Release to patient     Answer:   Routine Release          Plan:       We will going to increase her metoprolol to 50 mg twice a day.  Her blood pressure is tolerating it and she does not have any fatigue.  We will bring her back in 2 months and see if this has improved some of her symptoms.  I have also given her samples of Eliquis today.         Your medication list          Accurate as of February 20, 2023 11:07 AM. If you have any questions, ask your nurse or doctor.            CHANGE how you take these medications      Instructions Last Dose Given Next Dose Due   metoprolol tartrate 50 MG tablet  Commonly known as: LOPRESSOR  What changed:   · medication strength  · how much to take  Changed by: PJ West      Take 1 tablet by mouth Every 12 (Twelve) Hours.          CONTINUE taking these medications      Instructions Last Dose Given Next Dose Due   acetaminophen 325 MG tablet  Commonly known as: TYLENOL      Take 650 mg by mouth Every 4 (Four) Hours As Needed for Moderate Pain .       albuterol sulfate  (90 Base) MCG/ACT inhaler  Commonly known as: PROVENTIL HFA;VENTOLIN HFA;PROAIR HFA      Inhale 2 puffs Every 6 (Six) Hours As Needed for Wheezing.       apixaban 2.5 MG tablet tablet  Commonly known as: ELIQUIS      Take 1 tablet by mouth Every 12 (Twelve) Hours.       atorvastatin 40 MG tablet  Commonly known as: LIPITOR      Take 1 tablet by mouth Daily.       azithromycin 250 MG  tablet  Commonly known as: ZITHROMAX      Take 250 mg by mouth Daily.       benzonatate 200 MG capsule  Commonly known as: TESSALON      Take 1 capsule by mouth 3 (Three) Times a Day As Needed for Cough.       calcium carbonate 600 MG tablet  Commonly known as: OS-EVIN      Take 600 mg by mouth Daily. With vitamin D       cetirizine 10 MG tablet  Commonly known as: zyrTEC      Take 10 mg by mouth Daily.       cholecalciferol 25 MCG (1000 UT) tablet  Commonly known as: VITAMIN D3      Take 1,000 Units by mouth Daily.       citalopram 10 MG tablet  Commonly known as: CeleXA      Take 1 tablet by mouth Daily.       ezetimibe 10 MG tablet  Commonly known as: ZETIA      Take 1 tablet by mouth Daily.       FeroSul 325 (65 FE) MG tablet  Generic drug: ferrous sulfate      TAKE ONE TABLET BY MOUTH DAILY WITH BREAKFAST       Florajen Acidophilus capsule      Take 1 tablet by mouth Daily.       fluticasone 50 MCG/ACT nasal spray  Commonly known as: FLONASE      2 sprays into the nostril(s) as directed by provider At Night As Needed for Allergies.       fluticasone-salmeterol 230-21 MCG/ACT inhaler  Commonly known as: ADVAIR HFA      Inhale 2 puffs 2 (Two) Times a Day.       glucosamine-chondroitin 500-400 MG capsule capsule      Take 1 capsule by mouth Daily.       guaiFENesin 600 MG 12 hr tablet  Commonly known as: MUCINEX      Take 1 tablet by mouth Every 12 (Twelve) Hours.       ipratropium-albuterol 0.5-2.5 mg/3 ml nebulizer  Commonly known as: DUO-NEB      Take 3 mL by nebulization 2 (Two) Times a Day. SOB/WHEEZING       losartan 50 MG tablet  Commonly known as: COZAAR      Take 100 mg by mouth Daily.       multivitamin with minerals tablet tablet      Take 1 tablet by mouth 2 (Two) Times a Day.       O2  Commonly known as: OXYGEN      Inhale 3 L/min Every Night.       potassium chloride 10 MEQ CR capsule  Commonly known as: MICRO-K      Take 1 capsule by mouth Daily.       sodium chloride 0.9 % nebulizer solution       Take 3 mL by nebulization 2 (Two) Times a Day.       torsemide 20 MG tablet  Commonly known as: DEMADEX      Take 1 tablet by mouth 2 (Two) Times a Day.       VITAMIN B COMPLEX PO      Take 1 tablet by mouth Daily. TAKES VIT B12 1000MCG DAILY             Where to Get Your Medications      Information about where to get these medications is not yet available    Ask your nurse or doctor about these medications  · metoprolol tartrate 50 MG tablet           As always, it has been a pleasure to participate in your patient's care.      Sincerely,     Mayela OLIVA

## 2023-03-20 NOTE — TELEPHONE ENCOUNTER
Caller: Agnieszka Rizzo    Relationship: Self    Best call back number: [phone]    Requested Prescriptions:   Requested Prescriptions     Pending Prescriptions Disp Refills   • apixaban (ELIQUIS) 2.5 MG tablet tablet 14 tablet 0     Sig: Take 1 tablet by mouth Every 12 (Twelve) Hours.        Pharmacy where request should be sent: [facility] - [address] [phone] [facility] [phone]      Additional details provided by patient: PATIENT REQUESTING ELIQUIS 2.5 MG SAMPLES TO  AT [facility].     Does the patient have less than a 3 day supply:  [x] Yes  [] No    Would you like a call back once the refill request has been completed: [] Yes [x] No    If the office needs to give you a call back, can they leave a voicemail: [] Yes [x] No    Dennise Cortez Rep   03/20/23 12:49 EDT

## 2023-03-27 ENCOUNTER — TELEPHONE (OUTPATIENT)
Dept: CARDIOLOGY | Facility: CLINIC | Age: 88
End: 2023-03-27

## 2023-03-27 NOTE — TELEPHONE ENCOUNTER
Caller: Agnieszka Rizzo    Relationship to patient: Self    Best call back number: [phone]    Patient is needing: PATIENT REQUESTING SAMPLES OF 2.5 MG OF ELIQUIS . SHE WILL BE RUNNING OUT BEFORE HER MAIL DELIVERY COMES IN.

## 2023-03-28 NOTE — TELEPHONE ENCOUNTER
Left patient message that I will have her samples for her at the Main office on [facility] tomorrow for .     LEYLA Harry

## 2023-04-03 ENCOUNTER — APPOINTMENT (OUTPATIENT)
Dept: GENERAL RADIOLOGY | Facility: HOSPITAL | Age: 88
DRG: 871 | End: 2023-04-03
Payer: MEDICARE

## 2023-04-03 ENCOUNTER — HOSPITAL ENCOUNTER (INPATIENT)
Facility: HOSPITAL | Age: 88
LOS: 6 days | Discharge: HOME HEALTH | DRG: 871 | End: 2023-04-09
Attending: EMERGENCY MEDICINE | Admitting: INTERNAL MEDICINE
Payer: MEDICARE

## 2023-04-03 DIAGNOSIS — N17.9 AKI (ACUTE KIDNEY INJURY): ICD-10-CM

## 2023-04-03 DIAGNOSIS — I48.91 ATRIAL FIBRILLATION WITH RVR: ICD-10-CM

## 2023-04-03 DIAGNOSIS — J18.9 SEPSIS DUE TO PNEUMONIA: Primary | ICD-10-CM

## 2023-04-03 DIAGNOSIS — A41.9 SEPSIS DUE TO PNEUMONIA: Primary | ICD-10-CM
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
## 2023-04-03 DIAGNOSIS — R50.9 FEVER, UNSPECIFIED FEVER CAUSE: ICD-10-CM

## 2023-04-03 LAB
ALBUMIN SERPL-MCNC: 4.2 G/DL (ref 3.5–5.2)
ALBUMIN/GLOB SERPL: 1.2 G/DL
ALP SERPL-CCNC: 107 U/L (ref 39–117)
ALT SERPL W P-5'-P-CCNC: 16 U/L (ref 1–33)
ANION GAP SERPL CALCULATED.3IONS-SCNC: 11.8 MMOL/L (ref 5–15)
AST SERPL-CCNC: 22 U/L (ref 1–32)
B PARAPERT DNA SPEC QL NAA+PROBE: NOT DETECTED
B PERT DNA SPEC QL NAA+PROBE: NOT DETECTED
BASOPHILS # BLD AUTO: 0.1 10*3/MM3 (ref 0–0.2)
BASOPHILS NFR BLD AUTO: 1 % (ref 0–1.5)
BILIRUB SERPL-MCNC: 1.1 MG/DL (ref 0–1.2)
BUN SERPL-MCNC: 21 MG/DL (ref 8–23)
BUN/CREAT SERPL: 16.7 (ref 7–25)
C PNEUM DNA NPH QL NAA+NON-PROBE: NOT DETECTED
CALCIUM SPEC-SCNC: 9.7 MG/DL (ref 8.2–9.6)
CHLORIDE SERPL-SCNC: 93 MMOL/L (ref 98–107)
CO2 SERPL-SCNC: 30.2 MMOL/L (ref 22–29)
CREAT SERPL-MCNC: 1.26 MG/DL (ref 0.57–1)
D-LACTATE SERPL-SCNC: 1.1 MMOL/L (ref 0.5–2)
DEPRECATED RDW RBC AUTO: 46.5 FL (ref 37–54)
EGFRCR SERPLBLD CKD-EPI 2021: 40.1 ML/MIN/1.73
EOSINOPHIL # BLD AUTO: 0.06 10*3/MM3 (ref 0–0.4)
EOSINOPHIL NFR BLD AUTO: 0.6 % (ref 0.3–6.2)
ERYTHROCYTE [DISTWIDTH] IN BLOOD BY AUTOMATED COUNT: 13.7 % (ref 12.3–15.4)
FLUAV SUBTYP SPEC NAA+PROBE: NOT DETECTED
FLUBV RNA ISLT QL NAA+PROBE: NOT DETECTED
GLOBULIN UR ELPH-MCNC: 3.4 GM/DL
GLUCOSE SERPL-MCNC: 140 MG/DL (ref 65–99)
HADV DNA SPEC NAA+PROBE: NOT DETECTED
HCOV 229E RNA SPEC QL NAA+PROBE: NOT DETECTED
HCOV HKU1 RNA SPEC QL NAA+PROBE: NOT DETECTED
HCOV NL63 RNA SPEC QL NAA+PROBE: NOT DETECTED
HCOV OC43 RNA SPEC QL NAA+PROBE: NOT DETECTED
HCT VFR BLD AUTO: 51.8 % (ref 34–46.6)
HGB BLD-MCNC: 16.8 G/DL (ref 12–15.9)
HMPV RNA NPH QL NAA+NON-PROBE: NOT DETECTED
HPIV1 RNA ISLT QL NAA+PROBE: NOT DETECTED
HPIV2 RNA SPEC QL NAA+PROBE: NOT DETECTED
HPIV3 RNA NPH QL NAA+PROBE: NOT DETECTED
HPIV4 P GENE NPH QL NAA+PROBE: NOT DETECTED
IMM GRANULOCYTES # BLD AUTO: 0.04 10*3/MM3 (ref 0–0.05)
IMM GRANULOCYTES NFR BLD AUTO: 0.4 % (ref 0–0.5)
LYMPHOCYTES # BLD AUTO: 1.36 10*3/MM3 (ref 0.7–3.1)
LYMPHOCYTES NFR BLD AUTO: 14.3 % (ref 19.6–45.3)
M PNEUMO IGG SER IA-ACNC: NOT DETECTED
MCH RBC QN AUTO: 30.4 PG (ref 26.6–33)
MCHC RBC AUTO-ENTMCNC: 32.4 G/DL (ref 31.5–35.7)
MCV RBC AUTO: 93.7 FL (ref 79–97)
MONOCYTES # BLD AUTO: 0.94 10*3/MM3 (ref 0.1–0.9)
MONOCYTES NFR BLD AUTO: 9.9 % (ref 5–12)
NEUTROPHILS NFR BLD AUTO: 7.04 10*3/MM3 (ref 1.7–7)
NEUTROPHILS NFR BLD AUTO: 73.8 % (ref 42.7–76)
NRBC BLD AUTO-RTO: 1.5 /100 WBC (ref 0–0.2)
NT-PROBNP SERPL-MCNC: 3211 PG/ML (ref 0–1800)
PLATELET # BLD AUTO: 191 10*3/MM3 (ref 140–450)
PMV BLD AUTO: 9.6 FL (ref 6–12)
POTASSIUM SERPL-SCNC: 3.3 MMOL/L (ref 3.5–5.2)
PROCALCITONIN SERPL-MCNC: 0.19 NG/ML (ref 0–0.25)
PROT SERPL-MCNC: 7.6 G/DL (ref 6–8.5)
RBC # BLD AUTO: 5.53 10*6/MM3 (ref 3.77–5.28)
RHINOVIRUS RNA SPEC NAA+PROBE: NOT DETECTED
RSV RNA NPH QL NAA+NON-PROBE: NOT DETECTED
SARS-COV-2 RNA NPH QL NAA+NON-PROBE: NOT DETECTED
SODIUM SERPL-SCNC: 135 MMOL/L (ref 136–145)
TROPONIN T SERPL HS-MCNC: 30 NG/L
WBC NRBC COR # BLD: 9.54 10*3/MM3 (ref 3.4–10.8)

## 2023-04-03 PROCEDURE — 93010 ELECTROCARDIOGRAM REPORT: CPT | Performed by: INTERNAL MEDICINE

## 2023-04-03 PROCEDURE — 84145 PROCALCITONIN (PCT): CPT | Performed by: EMERGENCY MEDICINE

## 2023-04-03 PROCEDURE — 93005 ELECTROCARDIOGRAM TRACING: CPT | Performed by: EMERGENCY MEDICINE

## 2023-04-03 PROCEDURE — 99285 EMERGENCY DEPT VISIT HI MDM: CPT

## 2023-04-03 PROCEDURE — 25010000002 CEFTRIAXONE PER 250 MG: Performed by: EMERGENCY MEDICINE

## 2023-04-03 PROCEDURE — 0202U NFCT DS 22 TRGT SARS-COV-2: CPT | Performed by: EMERGENCY MEDICINE

## 2023-04-03 PROCEDURE — 85025 COMPLETE CBC W/AUTO DIFF WBC: CPT | Performed by: EMERGENCY MEDICINE

## 2023-04-03 PROCEDURE — 25010000002 AZITHROMYCIN PER 500 MG: Performed by: EMERGENCY MEDICINE

## 2023-04-03 PROCEDURE — 36415 COLL VENOUS BLD VENIPUNCTURE: CPT

## 2023-04-03 PROCEDURE — 83605 ASSAY OF LACTIC ACID: CPT | Performed by: EMERGENCY MEDICINE

## 2023-04-03 PROCEDURE — 71045 X-RAY EXAM CHEST 1 VIEW: CPT

## 2023-04-03 PROCEDURE — 87040 BLOOD CULTURE FOR BACTERIA: CPT | Performed by: EMERGENCY MEDICINE

## 2023-04-03 PROCEDURE — 80053 COMPREHEN METABOLIC PANEL: CPT | Performed by: EMERGENCY MEDICINE

## 2023-04-03 PROCEDURE — 83880 ASSAY OF NATRIURETIC PEPTIDE: CPT | Performed by: EMERGENCY MEDICINE

## 2023-04-03 PROCEDURE — 84484 ASSAY OF TROPONIN QUANT: CPT | Performed by: EMERGENCY MEDICINE

## 2023-04-03 RX ORDER — GUAIFENESIN 600 MG/1
600 TABLET, EXTENDED RELEASE ORAL 2 TIMES DAILY
COMMUNITY

## 2023-04-03 RX ORDER — ONDANSETRON 2 MG/ML
4 INJECTION INTRAMUSCULAR; INTRAVENOUS EVERY 6 HOURS PRN
Status: DISCONTINUED | OUTPATIENT
Start: 2023-04-03 | End: 2023-04-09 | Stop reason: HOSPADM

## 2023-04-03 RX ORDER — SODIUM CHLORIDE 0.9 % (FLUSH) 0.9 %
10 SYRINGE (ML) INJECTION AS NEEDED
Status: DISCONTINUED | OUTPATIENT
Start: 2023-04-03 | End: 2023-04-09 | Stop reason: HOSPADM

## 2023-04-03 RX ORDER — AMOXICILLIN 250 MG
2 CAPSULE ORAL 2 TIMES DAILY
Status: DISCONTINUED | OUTPATIENT
Start: 2023-04-04 | End: 2023-04-09 | Stop reason: HOSPADM

## 2023-04-03 RX ORDER — ACETAMINOPHEN 325 MG/1
650 TABLET ORAL EVERY 4 HOURS PRN
Status: DISCONTINUED | OUTPATIENT
Start: 2023-04-03 | End: 2023-04-09 | Stop reason: HOSPADM

## 2023-04-03 RX ORDER — NITROGLYCERIN 0.4 MG/1
0.4 TABLET SUBLINGUAL
Status: DISCONTINUED | OUTPATIENT
Start: 2023-04-03 | End: 2023-04-09 | Stop reason: HOSPADM

## 2023-04-03 RX ORDER — UREA 10 %
3 LOTION (ML) TOPICAL NIGHTLY PRN
Status: DISCONTINUED | OUTPATIENT
Start: 2023-04-03 | End: 2023-04-09 | Stop reason: HOSPADM

## 2023-04-03 RX ORDER — BISACODYL 5 MG/1
5 TABLET, DELAYED RELEASE ORAL DAILY PRN
Status: DISCONTINUED | OUTPATIENT
Start: 2023-04-03 | End: 2023-04-09 | Stop reason: HOSPADM

## 2023-04-03 RX ORDER — POLYETHYLENE GLYCOL 3350 17 G/17G
17 POWDER, FOR SOLUTION ORAL DAILY PRN
Status: DISCONTINUED | OUTPATIENT
Start: 2023-04-03 | End: 2023-04-09 | Stop reason: HOSPADM

## 2023-04-03 RX ORDER — ATORVASTATIN CALCIUM 20 MG/1
40 TABLET, FILM COATED ORAL DAILY
Status: DISCONTINUED | OUTPATIENT
Start: 2023-04-04 | End: 2023-04-04

## 2023-04-03 RX ORDER — ONDANSETRON 4 MG/1
4 TABLET, FILM COATED ORAL EVERY 6 HOURS PRN
Status: DISCONTINUED | OUTPATIENT
Start: 2023-04-03 | End: 2023-04-09 | Stop reason: HOSPADM

## 2023-04-03 RX ORDER — METOPROLOL TARTRATE 50 MG/1
50 TABLET, FILM COATED ORAL EVERY 12 HOURS SCHEDULED
Status: DISCONTINUED | OUTPATIENT
Start: 2023-04-04 | End: 2023-04-09 | Stop reason: HOSPADM

## 2023-04-03 RX ORDER — ACETAMINOPHEN 500 MG
1000 TABLET ORAL ONCE
Status: COMPLETED | OUTPATIENT
Start: 2023-04-03 | End: 2023-04-03

## 2023-04-03 RX ORDER — BISACODYL 10 MG
10 SUPPOSITORY, RECTAL RECTAL DAILY PRN
Status: DISCONTINUED | OUTPATIENT
Start: 2023-04-03 | End: 2023-04-09 | Stop reason: HOSPADM

## 2023-04-03 RX ORDER — ALUMINA, MAGNESIA, AND SIMETHICONE 2400; 2400; 240 MG/30ML; MG/30ML; MG/30ML
15 SUSPENSION ORAL EVERY 6 HOURS PRN
Status: DISCONTINUED | OUTPATIENT
Start: 2023-04-03 | End: 2023-04-08

## 2023-04-03 RX ADMIN — CEFTRIAXONE SODIUM 1 G: 1 INJECTION, POWDER, FOR SOLUTION INTRAMUSCULAR; INTRAVENOUS at 20:47

## 2023-04-03 RX ADMIN — AZITHROMYCIN DIHYDRATE 500 MG: 500 INJECTION, POWDER, LYOPHILIZED, FOR SOLUTION INTRAVENOUS at 20:50

## 2023-04-03 RX ADMIN — ACETAMINOPHEN 1000 MG: 500 TABLET, FILM COATED ORAL at 19:05

## 2023-04-03 NOTE — ED PROVIDER NOTES
EMERGENCY DEPARTMENT ENCOUNTER    Room Number:  S613/1  Date seen:  4/3/2023  PCP: Matt Rose MD  Historian: Patient      HPI:  Chief Complaint: Cough, congestion, dyspnea  A complete HPI/ROS/PMH/PSH/SH/FH are unobtainable / limited due to: N/A  Context: Agnieszka Rizzo is a 89 y.o. female who presents to the ED c/o persistent cough with shortness of breath and fever the past several days this week.  Patient lives alone by herself.  Her neighbor brought her here today because she was concerned about her wellbeing.  Patient has a history of bronchiectasis and she wears nasal cannula at home every nighttime but ordinarily does not have a daytime requirement.  She says she has not noticed any fevers earlier this week but today at triage her temperature was elevated.  She denies any vomiting or diarrhea.  She denies abdominal pain.  She denies any chest pain.  She took a home COVID test on Monday which was reportedly negative.  There have been no known sick contacts.        PAST MEDICAL HISTORY  Active Ambulatory Problems     Diagnosis Date Noted   • Primary hypertension 12/01/2016   • Chronic coronary artery disease 12/01/2016   • Familial hypercholesterolemia 12/01/2016   • Mitral valve insufficiency 12/01/2016   • Ventricular premature beats 12/01/2016   • Ventricular tachycardia 12/01/2016   • Acid-fast bacteria present 01/09/2017   • DNR (do not resuscitate) 03/12/2017   • Chronic diastolic CHF (congestive heart failure) ([facility]) 03/12/2017   • Asymptomatic bacteriuria 04/03/2017   • Iron deficiency anemia 04/04/2017   • Bronchiectasis 04/17/2017   • 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   • Paroxysmal atrial fibrillation (HCC) 06/09/2017   • Anxiety 06/20/2017   • Exposure to hepatitis A 04/20/2018   • Medicare annual wellness visit, subsequent 04/18/2019   • Abdominal pain 09/23/2019   • Herpes infection 11/19/2019   • Moderate asthma with acute exacerbation 12/02/2019   • Bronchitis, acute, with  bronchospasm 12/16/2019   • Abnormal EKG 12/16/2019   • Fall    • Laceration of left upper extremity 07/16/2021   • Multiple skin tears 07/16/2021   • Alteration in anticoagulation    • 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   • Clinical diagnosis of COVID-19 01/04/2022   • Pneumonia of right lung due to infectious organism, unspecified part of lung 07/09/2022   • Dizziness 09/09/2022   • Bronchiectasis 02/20/2023     Resolved Ambulatory Problems     Diagnosis Date Noted   • Pneumothorax of right lung after biopsy 11/12/2016   • Pulmonary aspergillosis 11/16/2016   • Heart failure, diastolic, with acute decompensation 12/01/2016   • Persistent atrial fibrillation 12/01/2016   • Pneumonia 01/01/2017   • Pneumonia with the fungal infection aspergillosis 01/06/2017   • Acute respiratory failure with hypoxia 01/09/2017   • Hyponatremia 02/08/2017   • C. difficile colitis 03/11/2017   • Sepsis 03/12/2017   • CHF (congestive heart failure) ([facility]) 03/22/2017   • Pancolitis 04/02/2017   • Hypokalemia 04/04/2017   • Pneumonia of both lungs due to infectious organism 05/31/2017   • Acute bronchitis due to parainfluenza virus 12/16/2019   • COPD with acute exacerbation  12/16/2019   • UTI (urinary tract infection) 12/19/2019   • COPD (chronic obstructive pulmonary disease)      Past Medical History:   Diagnosis Date   • Aspergillus    • Asthma    • Atrial fibrillation    • Atrial flutter    • C. difficile diarrhea 03/11/2017   • CAD (coronary artery disease)    • Colitis    • Cough    • Cryoglobulinemia    • Dyspnea on exertion    • Hyperlipidemia    • Hypertension    • Hypoxia    • Infectious viral hepatitis    • Left shoulder pain    • Leg swelling    • Lesion of lung    • Malignant hyperthermia due to anesthesia    • Mild tricuspid regurgitation    • MR (mitral regurgitation)    • MVP (mitral valve prolapse)    • Permanent atrial fibrillation    • Pneumothorax    • SOB (shortness of breath)    • Wheeze          PAST SURGICAL  HISTORY  Past Surgical History:   Procedure Laterality Date   • BRONCHOSCOPY N/A 11/12/2016    Procedure: BRONCHOSCOPY WITH FLUORO, BRUSHINGS, BAL, AND BIOPSIES;  Surgeon: Rogelio Tucker MD;  Location: [facility] ENDOSCOPY;  Service:    • BRONCHOSCOPY Bilateral 6/3/2017    Procedure: BRONCHOSCOPY with BAL ;  Surgeon: Sung King MD;  Location: [facility] ENDOSCOPY;  Service:    • BRONCHOSCOPY N/A 12/17/2019    Procedure: BRONCHOSCOPY WITH WASHINGS;  Surgeon: Rogelio Tucker MD;  Location: [facility] ENDOSCOPY;  Service: Pulmonary   • BRONCHOSCOPY N/A 7/15/2022    Procedure: BRONCHOSCOPY;  Surgeon: Rogelio Tucker MD;  Location: [facility] ENDOSCOPY;  Service: Pulmonary;  Laterality: N/A;  Pre: Pneumonia  Post: Pneumonia   • CATARACT EXTRACTION EXTRACAPSULAR W/ INTRAOCULAR LENS IMPLANTATION     • COLONOSCOPY      2013   • D & C WITH SUCTION     • HYSTERECTOMY     • KNEE ARTHROSCOPY Left          FAMILY HISTORY  Family History   Problem Relation Age of Onset   • Hypertension Mother    • Stroke Mother    • Hypertension Father    • Cancer Son    • Cancer Brother          SOCIAL HISTORY  Social History     Socioeconomic History   • Marital status:    Tobacco Use   • Smoking status: Never   • Smokeless tobacco: Never   • Tobacco comments:     caffiene daily   Vaping Use   • Vaping Use: Never used   Substance and Sexual Activity   • Alcohol use: Yes     Alcohol/week: 2.0 standard drinks     Types: 1 Glasses of wine, 1 Shots of liquor per week     Comment: occasional/  Daily caffeine use   • Drug use: No   • Sexual activity: Yes     Partners: Male         ALLERGIES  Amlodipine besylate, Aspirin, Bactrim [sulfamethoxazole-trimethoprim], Erythromycin, Levaquin [levofloxacin], Nitrofurantoin, and Ramipril        REVIEW OF SYSTEMS  Review of Systems         PHYSICAL EXAM  ED Triage Vitals   Temp Heart Rate Resp BP SpO2   04/03/23 1836 04/03/23 1836 04/03/23 1836 04/03/23 1843 04/03/23 1836   (!) 102.8 °F (39.3 °C) (!) 124 18 137/80 (!) 87 %       Temp src Heart Rate Source Patient Position BP Location FiO2 (%)   04/03/23 1836 04/03/23 1836 -- -- --   Tympanic Monitor          Physical Exam      GENERAL: Pleasant, elderly lady, calm, no diaphoresis, no acute distress  HENT: nares patent, normocephalic and atraumatic  EYES: no scleral icterus, EOMI, normal conjunctiva  CV: Irregularly irregular rhythm, tachycardic around 130 bpm, normal pulses at the radial arteries  RESPIRATORY: normal effort, no stridor, breath sounds are diminished bilaterally, there are scattered rhonchi and few wheezes notable.  Patient has a wet sounding cough occasionally during examination.  ABDOMEN: soft, nontender in all quadrants  MUSCULOSKELETAL: no deformity, no asymmetry, trace edema to the bilateral lower extremities  NEURO: alert, moves all extremities, follows commands  PSYCH:  calm, cooperative  SKIN: warm, dry    Vital signs and nursing notes reviewed.          LAB RESULTS  Recent Results (from the past 24 hour(s))   ECG 12 Lead Dyspnea    Collection Time: 04/03/23  6:48 PM   Result Value Ref Range    QT Interval 273 ms   Comprehensive Metabolic Panel    Collection Time: 04/03/23  6:51 PM    Specimen: Blood   Result Value Ref Range    Glucose 140 (H) 65 - 99 mg/dL    BUN 21 8 - 23 mg/dL    Creatinine 1.26 (H) 0.57 - 1.00 mg/dL    Sodium 135 (L) 136 - 145 mmol/L    Potassium 3.3 (L) 3.5 - 5.2 mmol/L    Chloride 93 (L) 98 - 107 mmol/L    CO2 30.2 (H) 22.0 - 29.0 mmol/L    Calcium 9.7 (H) 8.2 - 9.6 mg/dL    Total Protein 7.6 6.0 - 8.5 g/dL    Albumin 4.2 3.5 - 5.2 g/dL    ALT (SGPT) 16 1 - 33 U/L    AST (SGOT) 22 1 - 32 U/L    Alkaline Phosphatase 107 39 - 117 U/L    Total Bilirubin 1.1 0.0 - 1.2 mg/dL    Globulin 3.4 gm/dL    A/G Ratio 1.2 g/dL    BUN/Creatinine Ratio 16.7 7.0 - 25.0    Anion Gap 11.8 5.0 - 15.0 mmol/L    eGFR 40.1 (L) >60.0 mL/min/1.73   Single High Sensitivity Troponin T    Collection Time: 04/03/23  6:51 PM    Specimen: Blood   Result Value Ref Range     HS Troponin T 30 (H) <10 ng/L   BNP    Collection Time: 04/03/23  6:51 PM    Specimen: Blood   Result Value Ref Range    proBNP 3,211.0 (H) 0.0 - 1,800.0 pg/mL   Procalcitonin    Collection Time: 04/03/23  6:51 PM    Specimen: Blood   Result Value Ref Range    Procalcitonin 0.19 0.00 - 0.25 ng/mL   CBC Auto Differential    Collection Time: 04/03/23  6:51 PM    Specimen: Blood   Result Value Ref Range    WBC 9.54 3.40 - 10.80 10*3/mm3    RBC 5.53 (H) 3.77 - 5.28 10*6/mm3    Hemoglobin 16.8 (H) 12.0 - 15.9 g/dL    Hematocrit 51.8 (H) 34.0 - 46.6 %    MCV 93.7 79.0 - 97.0 fL    MCH 30.4 26.6 - 33.0 pg    MCHC 32.4 31.5 - 35.7 g/dL    RDW 13.7 12.3 - 15.4 %    RDW-SD 46.5 37.0 - 54.0 fl    MPV 9.6 6.0 - 12.0 fL    Platelets 191 140 - 450 10*3/mm3    Neutrophil % 73.8 42.7 - 76.0 %    Lymphocyte % 14.3 (L) 19.6 - 45.3 %    Monocyte % 9.9 5.0 - 12.0 %    Eosinophil % 0.6 0.3 - 6.2 %    Basophil % 1.0 0.0 - 1.5 %    Immature Grans % 0.4 0.0 - 0.5 %    Neutrophils, Absolute 7.04 (H) 1.70 - 7.00 10*3/mm3    Lymphocytes, Absolute 1.36 0.70 - 3.10 10*3/mm3    Monocytes, Absolute 0.94 (H) 0.10 - 0.90 10*3/mm3    Eosinophils, Absolute 0.06 0.00 - 0.40 10*3/mm3    Basophils, Absolute 0.10 0.00 - 0.20 10*3/mm3    Immature Grans, Absolute 0.04 0.00 - 0.05 10*3/mm3    nRBC 1.5 (H) 0.0 - 0.2 /100 WBC   Lactic Acid, Plasma    Collection Time: 04/03/23  6:52 PM    Specimen: Blood   Result Value Ref Range    Lactate 1.1 0.5 - 2.0 mmol/L   Respiratory Panel PCR w/COVID-19(SARS-CoV-2) ANAI/JORGE/THOM/PAD/COR/MAD/NIKHIL In-House, NP Swab in [facility], 3-4 HR TAT - Swab, Nasopharynx    Collection Time: 04/03/23  6:55 PM    Specimen: Nasopharynx; Swab   Result Value Ref Range    ADENOVIRUS, PCR Not Detected Not Detected    Coronavirus 229E Not Detected Not Detected    Coronavirus HKU1 Not Detected Not Detected    Coronavirus NL63 Not Detected Not Detected    Coronavirus OC43 Not Detected Not Detected    COVID19 Not Detected Not Detected - Ref. Range     Human Metapneumovirus Not Detected Not Detected    Human Rhinovirus/Enterovirus Not Detected Not Detected    Influenza A PCR Not Detected Not Detected    Influenza B PCR Not Detected Not Detected    Parainfluenza Virus 1 Not Detected Not Detected    Parainfluenza Virus 2 Not Detected Not Detected    Parainfluenza Virus 3 Not Detected Not Detected    Parainfluenza Virus 4 Not Detected Not Detected    RSV, PCR Not Detected Not Detected    Bordetella pertussis pcr Not Detected Not Detected    Bordetella parapertussis PCR Not Detected Not Detected    Chlamydophila pneumoniae PCR Not Detected Not Detected    Mycoplasma pneumo by PCR Not Detected Not Detected       Ordered the above labs and reviewed the results.        RADIOLOGY  XR Chest 1 View    Result Date: 4/3/2023  XR CHEST 1 VW-  HISTORY: Female who is 89 years-old,  fever, dyspnea  TECHNIQUE: Frontal view of the chest  COMPARISON: 07/15/2022  FINDINGS: The heart is enlarged. Aorta is calcified. Pulmonary vasculature is unremarkable. Patchy density at the mid lungs could be areas of developing infiltrate, follow-up suggested. No pleural effusion, or pneumothorax. No acute osseous process.      Patchy densities at the mid lungs could be areas of developing infiltrate, follow-up suggested. Cardiomegaly.  This report was finalized on 4/3/2023 7:34 PM by Dr. Steven Garza M.D.        Ordered the above noted radiological studies. Reviewed by me in PACS.        PROCEDURES  Procedures    EKG           EKG time/Interp time: 1848/1850  Rhythm/Rate: Atrial fibrillation with RVR, 130 bpm  P waves and NC: None  QRS, axis: 80 ms, normal axis  ST and T waves: No ST segment elevations are notable.    Independently interpreted by me contemporaneously with treatment        MEDICATIONS GIVEN IN ER  Medications   sodium chloride 0.9 % flush 10 mL (has no administration in time range)   acetaminophen (TYLENOL) tablet 1,000 mg (1,000 mg Oral Given 4/3/23 1905)   cefTRIAXone  (ROCEPHIN) 1 g in sodium chloride 0.9 % 100 mL IVPB-VTB (0 g Intravenous Stopped 4/3/23 2118)   azithromycin (ZITHROMAX) 500 mg in sodium chloride 0.9 % 250 mL IVPB-VTB (500 mg Intravenous New Bag 4/3/23 2050)                   MEDICAL DECISION MAKING, PROGRESS, and CONSULTS    All labs have been independently reviewed by me.  All radiology studies have been reviewed by me and I have also reviewed the radiology report.   EKG's independently viewed and interpreted by me.  Discussion below represents my analysis of pertinent findings related to patient's condition, differential diagnosis, treatment plan and final disposition.      Additional sources:  - Discussed/ obtained information from independent historians: None    - External (non-ED) record review: I reviewed the most recent cardiology office progress note from February 20, 2023 when patient had follow-up care for her diastolic CHF, paroxysmal atrial fibrillation, nonrheumatic mitral valve regurg and chronic coronary artery disease problems.  Plan was to increase her metoprolol to 50 mg twice a day and she was given samples of Eliquis at that time.    - Chronic or social conditions impacting care: Advanced age.  Lives in her own patio home independently.  She tells me that her daughter is currently ill with diverticulitis and unable to help care for her at this time.        Orders placed during this visit:  Orders Placed This Encounter   Procedures   • Respiratory Panel PCR w/COVID-19(SARS-CoV-2) ANAI/JORGE/THOM/PAD/COR/MAD/NIKHIL In-House, NP Swab in UTM/VTM, 3-4 HR TAT - Swab, Nasopharynx   • Blood Culture - Blood,   • Blood Culture - Blood,   • XR Chest 1 View   • Comprehensive Metabolic Panel   • Single High Sensitivity Troponin T   • BNP   • Urinalysis With Microscopic If Indicated (No Culture) - Urine, Clean Catch   • Lactic Acid, Plasma   • Procalcitonin   • CBC Auto Differential   • Monitor Blood Pressure   • Cardiac Monitoring   • Pulse Oximetry, Continuous    • LHA (on-call MD unless specified) Details   • ECG 12 Lead Dyspnea   • Insert Peripheral IV   • Inpatient Admission   • ED Bed Request   • CBC & Differential           Differential diagnosis includes but is not limited to:    Pneumonia, sepsis, influenza, COVID, atrial fibrillation with RVR, congestive heart failure exacerbation      Independent interpretation of labs, radiology studies, and discussions with consultants:  ED Course as of 04/03/23 2332   Mon Apr 03, 2023 2033 I independently interpreted the chest x-ray and my findings are: Cardiomegaly, bilateral infiltrative changes at the mid lungs and lower lungs.  No pneumothorax. [MANNY]   2035 proBNP(!): 3,211.0 [MANNY]   2035 Creatinine(!): 1.26  Creatinine is not very high but it is certainly quite elevated beyond her typical baseline. [MANNY]   2037 I just discussed with Dr. Valladares from [facility] about this patient and he agrees to accept her for continued management on the hospitalist service tonight. [MANNY]   2331 Heart rate gradually improved over time with IV fluids and temperature control.  Antibiotics were started with Rocephin and azithromycin combination.  Patient remained clinically stable with normal work of breathing on her nasal cannula oxygen.  Care transferred to the hospitalist service for further management tonight. [MANNY]   2332 HS Troponin T(!): 30 [MANNY]      ED Course User Index  [MANNY] Lexx Espitia MD           DIAGNOSIS  Final diagnoses:   Sepsis due to pneumonia   Atrial fibrillation with RVR   Fever, unspecified fever cause   ROB (acute kidney injury)         DISPOSITION  Admit to [facility], telemetry            Latest Documented Vital Signs:  As of 23:32 EDT  BP- 100/58 HR- 116 Temp- 97.6 °F (36.4 °C) (Oral) O2 sat- 97%              --    Please note that portions of this were completed with a voice recognition program.       Note Disclaimer: At [facility], we believe that sharing information builds trust and better relationships. You are receiving  this note because you are receiving care at [facility] or recently visited. It is possible you will see health information before a provider has talked with you about it. This kind of information can be easy to misunderstand. To help you fully understand what it means for your health, we urge you to discuss this note with your provider.           Lexx Espitia MD  04/03/23 0375

## 2023-04-03 NOTE — Clinical Note
Level of Care: Telemetry [5]   Diagnosis: Sepsis due to pneumonia [[id number]]   Admitting Physician: SHWETA FISCHER [[id number]]   Attending Physician: SHWETA FISCHER [[id number]]

## 2023-04-04 PROBLEM — J96.21 ACUTE ON CHRONIC RESPIRATORY FAILURE WITH HYPOXIA: Status: ACTIVE | Noted: 2017-01-09

## 2023-04-04 LAB
ANION GAP SERPL CALCULATED.3IONS-SCNC: 12 MMOL/L (ref 5–15)
BACTERIA UR QL AUTO: ABNORMAL /HPF
BILIRUB UR QL STRIP: NEGATIVE
BUN SERPL-MCNC: 20 MG/DL (ref 8–23)
BUN/CREAT SERPL: 21.3 (ref 7–25)
CALCIUM SPEC-SCNC: 8.7 MG/DL (ref 8.2–9.6)
CHLORIDE SERPL-SCNC: 100 MMOL/L (ref 98–107)
CLARITY UR: CLEAR
CO2 SERPL-SCNC: 23 MMOL/L (ref 22–29)
COLOR UR: YELLOW
CREAT SERPL-MCNC: 0.94 MG/DL (ref 0.57–1)
DEPRECATED RDW RBC AUTO: 50.2 FL (ref 37–54)
EGFRCR SERPLBLD CKD-EPI 2021: 57 ML/MIN/1.73
ERYTHROCYTE [DISTWIDTH] IN BLOOD BY AUTOMATED COUNT: 13.9 % (ref 12.3–15.4)
GEN 5 2HR TROPONIN T REFLEX: 25 NG/L
GLUCOSE SERPL-MCNC: 111 MG/DL (ref 65–99)
GLUCOSE UR STRIP-MCNC: NEGATIVE MG/DL
HCT VFR BLD AUTO: 35.5 % (ref 34–46.6)
HGB BLD-MCNC: 11.3 G/DL (ref 12–15.9)
HGB UR QL STRIP.AUTO: ABNORMAL
HYALINE CASTS UR QL AUTO: ABNORMAL /LPF
KETONES UR QL STRIP: ABNORMAL
L PNEUMO1 AG UR QL IA: NEGATIVE
LEUKOCYTE ESTERASE UR QL STRIP.AUTO: NEGATIVE
MAGNESIUM SERPL-MCNC: 2.5 MG/DL (ref 1.7–2.3)
MCH RBC QN AUTO: 31.2 PG (ref 26.6–33)
MCHC RBC AUTO-ENTMCNC: 31.8 G/DL (ref 31.5–35.7)
MCV RBC AUTO: 98.1 FL (ref 79–97)
MRSA DNA SPEC QL NAA+PROBE: NORMAL
NITRITE UR QL STRIP: NEGATIVE
PH UR STRIP.AUTO: 6.5 [PH] (ref 5–8)
PLATELET # BLD AUTO: 199 10*3/MM3 (ref 140–450)
PMV BLD AUTO: 9.9 FL (ref 6–12)
POTASSIUM SERPL-SCNC: 2.9 MMOL/L (ref 3.5–5.2)
PROT UR QL STRIP: NEGATIVE
QT INTERVAL: 273 MS
RBC # BLD AUTO: 3.62 10*6/MM3 (ref 3.77–5.28)
RBC # UR STRIP: ABNORMAL /HPF
REF LAB TEST METHOD: ABNORMAL
S PNEUM AG SPEC QL LA: NEGATIVE
SODIUM SERPL-SCNC: 135 MMOL/L (ref 136–145)
SP GR UR STRIP: 1.01 (ref 1–1.03)
SQUAMOUS #/AREA URNS HPF: ABNORMAL /HPF
TROPONIN T DELTA: -1 NG/L
TROPONIN T SERPL HS-MCNC: 26 NG/L
UROBILINOGEN UR QL STRIP: ABNORMAL
WBC # UR STRIP: ABNORMAL /HPF
WBC NRBC COR # BLD: 7.08 10*3/MM3 (ref 3.4–10.8)

## 2023-04-04 PROCEDURE — 81001 URINALYSIS AUTO W/SCOPE: CPT | Performed by: EMERGENCY MEDICINE

## 2023-04-04 PROCEDURE — 84484 ASSAY OF TROPONIN QUANT: CPT | Performed by: NURSE PRACTITIONER

## 2023-04-04 PROCEDURE — 94799 UNLISTED PULMONARY SVC/PX: CPT

## 2023-04-04 PROCEDURE — 87899 AGENT NOS ASSAY W/OPTIC: CPT | Performed by: NURSE PRACTITIONER

## 2023-04-04 PROCEDURE — 94760 N-INVAS EAR/PLS OXIMETRY 1: CPT

## 2023-04-04 PROCEDURE — 80048 BASIC METABOLIC PNL TOTAL CA: CPT | Performed by: NURSE PRACTITIONER

## 2023-04-04 PROCEDURE — 87641 MR-STAPH DNA AMP PROBE: CPT | Performed by: NURSE PRACTITIONER

## 2023-04-04 PROCEDURE — 25010000002 DIGOXIN PER 500 MCG: Performed by: NURSE PRACTITIONER

## 2023-04-04 PROCEDURE — 94664 DEMO&/EVAL PT USE INHALER: CPT

## 2023-04-04 PROCEDURE — 25010000002 CEFEPIME PER 500 MG: Performed by: INTERNAL MEDICINE

## 2023-04-04 PROCEDURE — 94667 MNPJ CHEST WALL 1ST: CPT

## 2023-04-04 PROCEDURE — 99222 1ST HOSP IP/OBS MODERATE 55: CPT | Performed by: NURSE PRACTITIONER

## 2023-04-04 PROCEDURE — 94640 AIRWAY INHALATION TREATMENT: CPT

## 2023-04-04 PROCEDURE — 85027 COMPLETE CBC AUTOMATED: CPT | Performed by: NURSE PRACTITIONER

## 2023-04-04 PROCEDURE — 94761 N-INVAS EAR/PLS OXIMETRY MLT: CPT

## 2023-04-04 PROCEDURE — 87449 NOS EACH ORGANISM AG IA: CPT | Performed by: NURSE PRACTITIONER

## 2023-04-04 PROCEDURE — 83735 ASSAY OF MAGNESIUM: CPT | Performed by: NURSE PRACTITIONER

## 2023-04-04 RX ORDER — IPRATROPIUM BROMIDE AND ALBUTEROL SULFATE 2.5; .5 MG/3ML; MG/3ML
3 SOLUTION RESPIRATORY (INHALATION)
Status: DISCONTINUED | OUTPATIENT
Start: 2023-04-04 | End: 2023-04-09 | Stop reason: HOSPADM

## 2023-04-04 RX ORDER — CITALOPRAM 10 MG/1
10 TABLET ORAL DAILY
Status: DISCONTINUED | OUTPATIENT
Start: 2023-04-04 | End: 2023-04-04

## 2023-04-04 RX ORDER — TORSEMIDE 20 MG/1
20 TABLET ORAL 2 TIMES DAILY
Status: DISCONTINUED | OUTPATIENT
Start: 2023-04-04 | End: 2023-04-09 | Stop reason: HOSPADM

## 2023-04-04 RX ORDER — DEXTROMETHORPHAN POLISTIREX 30 MG/5ML
60 SUSPENSION ORAL EVERY 12 HOURS SCHEDULED
Status: DISCONTINUED | OUTPATIENT
Start: 2023-04-04 | End: 2023-04-06

## 2023-04-04 RX ORDER — GUAIFENESIN 600 MG/1
600 TABLET, EXTENDED RELEASE ORAL 2 TIMES DAILY
Status: DISCONTINUED | OUTPATIENT
Start: 2023-04-04 | End: 2023-04-09 | Stop reason: HOSPADM

## 2023-04-04 RX ORDER — FLUTICASONE PROPIONATE 50 MCG
2 SPRAY, SUSPENSION (ML) NASAL NIGHTLY PRN
Status: DISCONTINUED | OUTPATIENT
Start: 2023-04-04 | End: 2023-04-07

## 2023-04-04 RX ORDER — L.ACID,PARA/B.BIFIDUM/S.THERM 8B CELL
1 CAPSULE ORAL DAILY
Status: DISCONTINUED | OUTPATIENT
Start: 2023-04-04 | End: 2023-04-09 | Stop reason: HOSPADM

## 2023-04-04 RX ORDER — FERROUS SULFATE 325(65) MG
325 TABLET ORAL
Status: DISCONTINUED | OUTPATIENT
Start: 2023-04-04 | End: 2023-04-07

## 2023-04-04 RX ORDER — ALBUTEROL SULFATE 2.5 MG/3ML
2.5 SOLUTION RESPIRATORY (INHALATION) EVERY 6 HOURS PRN
Status: DISCONTINUED | OUTPATIENT
Start: 2023-04-04 | End: 2023-04-09 | Stop reason: HOSPADM

## 2023-04-04 RX ORDER — POTASSIUM CHLORIDE 750 MG/1
40 TABLET, FILM COATED, EXTENDED RELEASE ORAL EVERY 4 HOURS
Status: COMPLETED | OUTPATIENT
Start: 2023-04-04 | End: 2023-04-04

## 2023-04-04 RX ORDER — SODIUM CHLORIDE FOR INHALATION 0.9 %
3 VIAL, NEBULIZER (ML) INHALATION 2 TIMES DAILY
Status: DISCONTINUED | OUTPATIENT
Start: 2023-04-04 | End: 2023-04-09 | Stop reason: HOSPADM

## 2023-04-04 RX ORDER — DIGOXIN 0.25 MG/ML
250 INJECTION INTRAMUSCULAR; INTRAVENOUS EVERY 6 HOURS
Status: COMPLETED | OUTPATIENT
Start: 2023-04-04 | End: 2023-04-04

## 2023-04-04 RX ORDER — BUDESONIDE AND FORMOTEROL FUMARATE DIHYDRATE 160; 4.5 UG/1; UG/1
2 AEROSOL RESPIRATORY (INHALATION)
Status: DISCONTINUED | OUTPATIENT
Start: 2023-04-04 | End: 2023-04-09 | Stop reason: HOSPADM

## 2023-04-04 RX ORDER — BENZONATATE 100 MG/1
200 CAPSULE ORAL 3 TIMES DAILY PRN
Status: DISCONTINUED | OUTPATIENT
Start: 2023-04-04 | End: 2023-04-06

## 2023-04-04 RX ORDER — MELATONIN
1000 DAILY
Status: DISCONTINUED | OUTPATIENT
Start: 2023-04-04 | End: 2023-04-09 | Stop reason: HOSPADM

## 2023-04-04 RX ORDER — CETIRIZINE HYDROCHLORIDE 10 MG/1
10 TABLET ORAL DAILY
Status: DISCONTINUED | OUTPATIENT
Start: 2023-04-04 | End: 2023-04-07

## 2023-04-04 RX ORDER — ATORVASTATIN CALCIUM 20 MG/1
40 TABLET, FILM COATED ORAL DAILY
Status: DISCONTINUED | OUTPATIENT
Start: 2023-04-04 | End: 2023-04-06

## 2023-04-04 RX ORDER — CITALOPRAM 10 MG/1
10 TABLET ORAL DAILY
Status: DISCONTINUED | OUTPATIENT
Start: 2023-04-04 | End: 2023-04-09 | Stop reason: HOSPADM

## 2023-04-04 RX ORDER — POTASSIUM CHLORIDE 750 MG/1
10 TABLET, FILM COATED, EXTENDED RELEASE ORAL DAILY
Status: DISCONTINUED | OUTPATIENT
Start: 2023-04-04 | End: 2023-04-09 | Stop reason: HOSPADM

## 2023-04-04 RX ADMIN — METOPROLOL TARTRATE 50 MG: 50 TABLET, FILM COATED ORAL at 00:24

## 2023-04-04 RX ADMIN — POTASSIUM CHLORIDE 10 MEQ: 750 TABLET, EXTENDED RELEASE ORAL at 16:29

## 2023-04-04 RX ADMIN — IPRATROPIUM BROMIDE AND ALBUTEROL SULFATE 3 ML: 2.5; .5 SOLUTION RESPIRATORY (INHALATION) at 11:15

## 2023-04-04 RX ADMIN — DEXTROMETHORPHAN POLISTIREX 60 MG: 30 SUSPENSION ORAL at 12:29

## 2023-04-04 RX ADMIN — DEXTROMETHORPHAN POLISTIREX 60 MG: 30 SUSPENSION ORAL at 20:23

## 2023-04-04 RX ADMIN — METOPROLOL TARTRATE 50 MG: 50 TABLET, FILM COATED ORAL at 20:21

## 2023-04-04 RX ADMIN — TORSEMIDE 20 MG: 20 TABLET ORAL at 12:29

## 2023-04-04 RX ADMIN — APIXABAN 2.5 MG: 2.5 TABLET, FILM COATED ORAL at 00:24

## 2023-04-04 RX ADMIN — CEFEPIME 2 G: 2 INJECTION, POWDER, FOR SOLUTION INTRAVENOUS at 22:38

## 2023-04-04 RX ADMIN — IPRATROPIUM BROMIDE AND ALBUTEROL SULFATE 3 ML: 2.5; .5 SOLUTION RESPIRATORY (INHALATION) at 15:17

## 2023-04-04 RX ADMIN — FERROUS SULFATE TAB 325 MG (65 MG ELEMENTAL FE) 325 MG: 325 (65 FE) TAB at 12:29

## 2023-04-04 RX ADMIN — Medication 1 CAPSULE: at 12:29

## 2023-04-04 RX ADMIN — BUDESONIDE AND FORMOTEROL FUMARATE DIHYDRATE 2 PUFF: 160; 4.5 AEROSOL RESPIRATORY (INHALATION) at 19:18

## 2023-04-04 RX ADMIN — GUAIFENESIN 600 MG: 600 TABLET, EXTENDED RELEASE ORAL at 12:29

## 2023-04-04 RX ADMIN — APIXABAN 2.5 MG: 2.5 TABLET, FILM COATED ORAL at 20:21

## 2023-04-04 RX ADMIN — TORSEMIDE 20 MG: 20 TABLET ORAL at 20:21

## 2023-04-04 RX ADMIN — DOCUSATE SODIUM 50MG AND SENNOSIDES 8.6MG 2 TABLET: 8.6; 5 TABLET, FILM COATED ORAL at 00:24

## 2023-04-04 RX ADMIN — DIGOXIN 250 MCG: 0.25 INJECTION INTRAMUSCULAR; INTRAVENOUS at 13:50

## 2023-04-04 RX ADMIN — POTASSIUM CHLORIDE 40 MEQ: 750 TABLET, EXTENDED RELEASE ORAL at 08:21

## 2023-04-04 RX ADMIN — Medication 1000 UNITS: at 12:29

## 2023-04-04 RX ADMIN — METOPROLOL TARTRATE 50 MG: 50 TABLET, FILM COATED ORAL at 08:25

## 2023-04-04 RX ADMIN — APIXABAN 2.5 MG: 2.5 TABLET, FILM COATED ORAL at 08:21

## 2023-04-04 RX ADMIN — CEFEPIME 2 G: 2 INJECTION, POWDER, FOR SOLUTION INTRAVENOUS at 14:52

## 2023-04-04 RX ADMIN — ISODIUM CHLORIDE 3 ML: 0.03 SOLUTION RESPIRATORY (INHALATION) at 11:16

## 2023-04-04 RX ADMIN — ISODIUM CHLORIDE 3 ML: 0.03 SOLUTION RESPIRATORY (INHALATION) at 19:21

## 2023-04-04 RX ADMIN — CETIRIZINE HYDROCHLORIDE 10 MG: 10 TABLET ORAL at 12:29

## 2023-04-04 RX ADMIN — ATORVASTATIN CALCIUM 40 MG: 20 TABLET, FILM COATED ORAL at 20:22

## 2023-04-04 RX ADMIN — BENZONATATE 200 MG: 100 CAPSULE ORAL at 08:26

## 2023-04-04 RX ADMIN — GUAIFENESIN 600 MG: 600 TABLET, EXTENDED RELEASE ORAL at 20:20

## 2023-04-04 RX ADMIN — POTASSIUM CHLORIDE 40 MEQ: 750 TABLET, EXTENDED RELEASE ORAL at 12:29

## 2023-04-04 RX ADMIN — DIGOXIN 250 MCG: 0.25 INJECTION INTRAMUSCULAR; INTRAVENOUS at 16:29

## 2023-04-04 RX ADMIN — BENZONATATE 200 MG: 100 CAPSULE ORAL at 16:30

## 2023-04-04 RX ADMIN — CITALOPRAM 10 MG: 10 TABLET, FILM COATED ORAL at 20:21

## 2023-04-04 NOTE — CONSULTS
CONSULT NOTE    Patient Identification:  Agnieszka Rizzo  89 y.o.  female  8/7/1930  [id number]            Requesting physician: Dr. Bob Pretty    Reason for Consultation: Bronchiectasis acute exacerbation    CC: Short of breath cough    History of Present Illness:  Patient is a very nice 89-year-old with a previous medical history of Mycobacterium avium intracellular status post treatment as well as bronchiectasis who presented to the emergency room with worsening cough shortness of breath.  Increased sputum production.  No hemoptysis.  No lethargy however decreased energy.  Imaging was concerning for pneumonia.  Symptoms were progressively worse.  She has been admitted and start azithromycin and ceftriaxone.    She says she is very faithful with her bronchodilator regimen and her airway clearance.    Review of Systems:  As per HPI otherwise a 12 system review of systems performed and all else negative    Past Medical History:   Diagnosis Date   • Aspergillus    • Asthma    • Atrial fibrillation     chronic   • Atrial flutter    • Bronchiectasis    • C. difficile diarrhea 03/11/2017   • CAD (coronary artery disease)     nonobstructive   • Chronic diastolic CHF (congestive heart failure)    • Colitis    • COPD (chronic obstructive pulmonary disease)    • Cough    • Cryoglobulinemia    • Dyspnea on exertion    • Fall    • Hyperlipidemia    • Hypertension    • Hyponatremia    • Hypoxia    • Infectious viral hepatitis     AGE 13   • Left shoulder pain    • Leg swelling    • Lesion of lung    • Malignant hyperthermia due to anesthesia    • Mild tricuspid regurgitation    • MR (mitral regurgitation)     mild   • MVP (mitral valve prolapse)    • Permanent atrial fibrillation    • Pneumonia with the fungal infection aspergillosis 01/06/2017   • Pneumothorax    • SOB (shortness of breath)    • UTI (urinary tract infection)    • Wheeze     mild       Past Surgical History:   Procedure Laterality Date   •  BRONCHOSCOPY N/A 11/12/2016    Procedure: BRONCHOSCOPY WITH FLUORO, BRUSHINGS, BAL, AND BIOPSIES;  Surgeon: Rogelio Tucker MD;  Location: [facility] ENDOSCOPY;  Service:    • BRONCHOSCOPY Bilateral 6/3/2017    Procedure: BRONCHOSCOPY with BAL ;  Surgeon: Sung King MD;  Location: [facility] ENDOSCOPY;  Service:    • BRONCHOSCOPY N/A 12/17/2019    Procedure: BRONCHOSCOPY WITH WASHINGS;  Surgeon: Rogelio Tucker MD;  Location: [facility] ENDOSCOPY;  Service: Pulmonary   • BRONCHOSCOPY N/A 7/15/2022    Procedure: BRONCHOSCOPY;  Surgeon: Rogelio Tucker MD;  Location: [facility] ENDOSCOPY;  Service: Pulmonary;  Laterality: N/A;  Pre: Pneumonia  Post: Pneumonia   • CATARACT EXTRACTION EXTRACAPSULAR W/ INTRAOCULAR LENS IMPLANTATION     • COLONOSCOPY      2013   • D & C WITH SUCTION     • HYSTERECTOMY     • KNEE ARTHROSCOPY Left         Medications Prior to Admission   Medication Sig Dispense Refill Last Dose   • acetaminophen (TYLENOL) 325 MG tablet Take 2 tablets by mouth Every 4 (Four) Hours As Needed for Moderate Pain.   Past Week   • albuterol sulfate  (90 Base) MCG/ACT inhaler Inhale 2 puffs Every 6 (Six) Hours As Needed for Wheezing.   Past Week   • apixaban (ELIQUIS) 2.5 MG tablet tablet Take 1 tablet by mouth Every 12 (Twelve) Hours. 14 tablet 0 4/3/2023   • atorvastatin (LIPITOR) 40 MG tablet Take 1 tablet by mouth Daily. 90 tablet 3 4/2/2023   • azithromycin (ZITHROMAX) 250 MG tablet Take 1 tablet by mouth Daily.   Past Week   • B Complex Vitamins (VITAMIN B COMPLEX PO) Take 1 tablet by mouth Daily. TAKES VIT B12 1000MCG DAILY   4/3/2023   • calcium carbonate (OS-EVIN) 600 MG tablet Take 1 tablet by mouth Daily. With vitamin D   4/3/2023   • cetirizine (zyrTEC) 10 MG tablet Take 1 tablet by mouth Daily.   4/3/2023   • cholecalciferol (VITAMIN D3) 25 MCG (1000 UT) tablet Take 1 tablet by mouth Daily.   4/3/2023   • citalopram (CeleXA) 10 MG tablet Take 1 tablet by mouth Daily. 90 tablet 2 4/3/2023   • ezetimibe (ZETIA) 10 MG  tablet Take 1 tablet by mouth Daily. 90 tablet 3 4/2/2023   • FeroSul 325 (65 Fe) MG tablet TAKE ONE TABLET BY MOUTH DAILY WITH BREAKFAST 90 tablet 0 4/3/2023   • fluticasone (FLONASE) 50 MCG/ACT nasal spray 2 sprays into the nostril(s) as directed by provider At Night As Needed for Allergies.   Past Week   • fluticasone-salmeterol (ADVAIR HFA) 230-21 MCG/ACT inhaler Inhale 2 puffs 2 (Two) Times a Day.   4/3/2023   • glucosamine-chondroitin 500-400 MG capsule capsule Take 1 capsule by mouth Daily.   4/3/2023   • guaiFENesin (MUCINEX) 600 MG 12 hr tablet Take 1 tablet by mouth 2 (Two) Times a Day.   4/3/2023   • ipratropium-albuterol (DUO-NEB) 0.5-2.5 mg/3 ml nebulizer Take 3 mL by nebulization 2 (Two) Times a Day. SOB/WHEEZING   4/3/2023   • Lactobacillus (FLORAJEN ACIDOPHILUS) capsule Take 1 tablet by mouth Daily.   4/3/2023   • losartan (COZAAR) 50 MG tablet Take 2 tablets by mouth Daily.   4/3/2023   • metoprolol tartrate (LOPRESSOR) 50 MG tablet Take 1 tablet by mouth Every 12 (Twelve) Hours. 180 tablet 3 4/3/2023   • Multiple Vitamins-Minerals (PRESERVISION AREDS PO) Take 1 tablet by mouth 2 (Two) Times a Day.   4/3/2023   • potassium chloride (MICRO-K) 10 MEQ CR capsule Take 1 capsule by mouth Daily. 90 capsule 3 4/3/2023   • sodium chloride 0.9 % nebulizer solution Take 3 mL by nebulization 2 (Two) Times a Day.   4/3/2023   • torsemide (DEMADEX) 20 MG tablet Take 1 tablet by mouth 2 (Two) Times a Day. 180 tablet 3 4/3/2023   • O2 (OXYGEN) Inhale 3 L/min Every Night.          Allergies   Allergen Reactions   • Amlodipine Besylate Swelling   • Aspirin GI Intolerance     Caused bleeding ulcers   • Bactrim [Sulfamethoxazole-Trimethoprim] Nausea And Vomiting   • Erythromycin Unknown (See Comments)     Patient does not recall type of reaction.   • Levaquin [Levofloxacin] Unknown (See Comments)     Patient does not recall what type of reaction.   • Nitrofurantoin Nausea Only and Nausea And Vomiting   • Ramipril  "Other (See Comments)     Cough       Social History     Socioeconomic History   • Marital status:    Tobacco Use   • Smoking status: Never   • Smokeless tobacco: Never   • Tobacco comments:     caffiene daily   Vaping Use   • Vaping Use: Never used   Substance and Sexual Activity   • Alcohol use: Yes     Alcohol/week: 2.0 standard drinks     Types: 1 Glasses of wine, 1 Shots of liquor per week     Comment: occasional/  Daily caffeine use   • Drug use: No   • Sexual activity: Yes     Partners: Male       Family History   Problem Relation Age of Onset   • Hypertension Mother    • Stroke Mother    • Hypertension Father    • Cancer Son    • Cancer Brother        Physical Exam:  /87 (BP Location: Right arm, Patient Position: Lying)   Pulse 108   Temp 99.3 °F (37.4 °C) (Oral)   Resp 20   Ht 160 cm (63\")   Wt 56.7 kg (124 lb 14.4 oz)   SpO2 95%   BMI 22.13 kg/m²   Body mass index is 22.13 kg/m².   General appearance: Nontoxic awake pleasant, conversant   Eyes: Anicteric sclerae, moist conjunctivae;   HENT: Atraumatic; oropharynx clear with moist mucous membranes   Neck: Trachea midline; supple  Lungs: Rhonchi diffusely, with normal respiratory effort and no intercostal retractions  CV: RRR, no rub  Abdomen: Bowel sounds positive nonrigid  Extremities: No peripheral edema or extremity lymphadenopathy  Skin: Normal temperature, turgor and texture; no rash  Psych: Appropriate affect, alert and oriented   Neuro cranials 2 through 12 grossly intact speech intact moves all extremities    LABS:  Results from last 7 days   Lab Units 04/04/23  0215 04/03/23  1851   WBC 10*3/mm3 7.08 9.54   HEMOGLOBIN g/dL 11.3* 16.8*   PLATELETS 10*3/mm3 199 191     Results from last 7 days   Lab Units 04/04/23  0900 04/04/23  0215 04/03/23  1851   SODIUM mmol/L  --  135* 135*   POTASSIUM mmol/L  --  2.9* 3.3*   CHLORIDE mmol/L  --  100 93*   CO2 mmol/L  --  23.0 30.2*   BUN mg/dL  --  20 21   CREATININE mg/dL  --  0.94 1.26* "   GLUCOSE mg/dL  --  111* 140*   CALCIUM mg/dL  --  8.7 9.7*   MAGNESIUM mg/dL 2.5*  --   --    Estimated Creatinine Clearance: 34.2 mL/min (by C-G formula based on SCr of 0.94 mg/dL).    Imaging: I personally visualized the images of scans/x-rays performed within last 3 days.  Imaging Results (Most Recent)     Procedure Component Value Units Date/Time    XR Chest 1 View [[id number]] Collected: 04/03/23 1932     Updated: 04/03/23 1937    Narrative:      XR CHEST 1 VW-     HISTORY: Female who is 89 years-old,  fever, dyspnea     TECHNIQUE: Frontal view of the chest     COMPARISON: 07/15/2022     FINDINGS: The heart is enlarged. Aorta is calcified. Pulmonary  vasculature is unremarkable. Patchy density at the mid lungs could be  areas of developing infiltrate, follow-up suggested. No pleural  effusion, or pneumothorax. No acute osseous process.       Impression:      Patchy densities at the mid lungs could be areas of  developing infiltrate, follow-up suggested. Cardiomegaly.     This report was finalized on 4/3/2023 7:34 PM by Dr. Steven Garza M.D.             Assessment / Recommendations:  Bronchiectasis with acute exacerbation  Pneumonia  Hypokalemia  AHRF   atrial fibrillation  Mitral valve regurgitation  Hypertension  Coronary disease  Chronic diastolic heart failure    From pulmonary perspective acute arthritis possibility of benefit.    She is having acute exacerbation of bronchiectasis she has significant rhonchi diffusely.  Flutter valve  Chest physiotherapy  Hypertonic saline  Scheduled DuoNebs  We will cover for Pseudomonas with cefepime stop ceftriaxone.  Send urine Legionella if negative can stop azithromycin.  Follow sputum cultures.  Discussed with the patient and her daughter at bedside.  Outpatient received and reviewed outpatient documentations prior CT scans pulmonary function testing.      Guillermo Pimentel MD  [facility]  04/04/23  14:23 EDT

## 2023-04-04 NOTE — PLAN OF CARE
Goal Outcome Evaluation:  Plan of Care Reviewed With: patient        Progress: improving   Pt a/o x4, 2L NC, afib on monitor running 110-130. Dig 250IV q6 for rate control. BP stable. Tessalon pearls and Delsym given for cough. Pt states she is only coughing up small amounts every now and then. Told her if she gets good amount to spit in specimen cup for sputum culture. Low-grade fever. Ad-mira. IVABT. WCTM.

## 2023-04-04 NOTE — CONSULTS
Patient Name: Agnieszka Rizzo  :1930  89 y.o.    Date of Admission: 4/3/2023  Date of Consultation:  23  Encounter Provider: PJ Day  Place of Service: [facility] CARDIOLOGY  Referring Provider: Armando Valladares MD  Patient Care Team:  Matt Rose MD as PCP - General (Family Medicine)  Marcie Robbins MD as Consulting Physician (Cardiology)  Nilesh Danielle MD as Consulting Physician (Urology)  Lina Morris RPH as Pharmacist  Lev Mckeon PharmD as Pharmacist (Pharmacy)  Rogelio Tucker MD as Consulting Physician (Pulmonary Disease)      Chief complaint: shortness of breath, cough    History of Present Illness: Ms. Rizzo is a 89 year old woman followed by Dr. Robbins for persistent atrial fibrillation and chronic diastolic heart failure. She had mild non obstructive coronary artery disease by cath in . She had a normal stress test in 2018. She has hypertension, hyperlipidemia, COPD. She saw Dr. Robbins in January. She wore a 2 week monitor to assess AF rate control. Rate note to be rapid at times. Metoprolol was increased. She was re evaluated in February and metoprolol was titrated further to 50 mg twice daily. She is anticoagulated with apixaban. .    She started feeling poorly over the weekend. She had nocturnal urinary frequency. Then noticed shortness of breath, cough and fever. She has a productive cough. Fever in the .8. CXR suspicious for pneumonia. She was started on antibiotics. WBC count normal. procalcitonin negative.     High sensitivity troponin 30 then 26 to 25. Potassium 2.9. proBNP 3211.     She weighs daily at home and weight is unchanged.     HR is rapid. She has received her home metoprolol of 50 mg.       Echo 2022  • Calculated left ventricular EF = 55.2% Estimated left ventricular EF was in agreement with the calculated left ventricular EF. Left ventricular systolic function is normal.  • Left ventricular wall  thickness is consistent with moderate concentric hypertrophy.  • Left atrial volume is moderately increased.  • The right atrial cavity is severely dilated.  • Moderate mitral valve regurgitation is present with an eccentric jet noted.  • Moderate tricuspid valve regurgitation is present.  • Estimated right ventricular systolic pressure from tricuspid regurgitation is normal (<35 mmHg). Calculated right ventricular systolic pressure from tricuspid regurgitation is 31 mmHg.    Stress 2018  • Left ventricular ejection fraction is normal (Calculated EF = 68%).  • Myocardial perfusion imaging indicates a normal myocardial perfusion study with no evidence of ischemia.  • Impressions are consistent with a low risk study.         Past Medical History:   Diagnosis Date   • Aspergillus    • Asthma    • Atrial fibrillation     chronic   • Atrial flutter    • Bronchiectasis    • C. difficile diarrhea 03/11/2017   • CAD (coronary artery disease)     nonobstructive   • Chronic diastolic CHF (congestive heart failure)    • Colitis    • COPD (chronic obstructive pulmonary disease)    • Cough    • Cryoglobulinemia    • Dyspnea on exertion    • Fall    • Hyperlipidemia    • Hypertension    • Hyponatremia    • Hypoxia    • Infectious viral hepatitis     AGE 13   • Left shoulder pain    • Leg swelling    • Lesion of lung    • Malignant hyperthermia due to anesthesia    • Mild tricuspid regurgitation    • MR (mitral regurgitation)     mild   • MVP (mitral valve prolapse)    • Permanent atrial fibrillation    • Pneumonia with the fungal infection aspergillosis 01/06/2017   • Pneumothorax    • SOB (shortness of breath)    • UTI (urinary tract infection)    • Wheeze     mild       Past Surgical History:   Procedure Laterality Date   • BRONCHOSCOPY N/A 11/12/2016    Procedure: BRONCHOSCOPY WITH FLUORO, BRUSHINGS, BAL, AND BIOPSIES;  Surgeon: Rogelio Tucker MD;  Location: [facility];  Service:    • BRONCHOSCOPY Bilateral 6/3/2017     Procedure: BRONCHOSCOPY with BAL ;  Surgeon: Sung King MD;  Location:  [facility];  Service:    • BRONCHOSCOPY N/A 12/17/2019    Procedure: BRONCHOSCOPY WITH WASHINGS;  Surgeon: Rogelio Tucker MD;  Location[facility];  Service: Pulmonary   • BRONCHOSCOPY N/A 7/15/2022    Procedure: BRONCHOSCOPY;  Surgeon: Rogelio Tucker MD;  Location[facility];  Service: Pulmonary;  Laterality: N/A;  Pre: Pneumonia  Post: Pneumonia   • CATARACT EXTRACTION EXTRACAPSULAR W/ INTRAOCULAR LENS IMPLANTATION     • COLONOSCOPY      2013   • D & C WITH SUCTION     • HYSTERECTOMY     • KNEE ARTHROSCOPY Left          Prior to Admission medications    Medication Sig Start Date End Date Taking? Authorizing Provider   acetaminophen (TYLENOL) 325 MG tablet Take 2 tablets by mouth Every 4 (Four) Hours As Needed for Moderate Pain. 7/13/21  Yes Jane Mcclellan MD   albuterol sulfate  (90 Base) MCG/ACT inhaler Inhale 2 puffs Every 6 (Six) Hours As Needed for Wheezing.   Yes Jane Mcclellan MD   apixaban (ELIQUIS) 2.5 MG tablet tablet Take 1 tablet by mouth Every 12 (Twelve) Hours. 3/20/23  Yes Marcie Robbins MD   atorvastatin (LIPITOR) 40 MG tablet Take 1 tablet by mouth Daily. 11/11/22  Yes Marcie Robbins MD   azithromycin (ZITHROMAX) 250 MG tablet Take 1 tablet by mouth Daily.   Yes Jane Mcclellan MD   B Complex Vitamins (VITAMIN B COMPLEX PO) Take 1 tablet by mouth Daily. TAKES VIT B12 1000MCG DAILY 8/12/22  Yes Jane Mcclellan MD   calcium carbonate (OS-EVIN) 600 MG tablet Take 1 tablet by mouth Daily. With vitamin D   Yes Jane Mcclellan MD   cetirizine (zyrTEC) 10 MG tablet Take 1 tablet by mouth Daily.   Yes Jane Mcclellan MD   cholecalciferol (VITAMIN D3) 25 MCG (1000 UT) tablet Take 1 tablet by mouth Daily.   Yes Jane Mcclellan MD   citalopram (CeleXA) 10 MG tablet Take 1 tablet by mouth Daily. 9/9/22  Yes Matt Rose MD   ezetimibe (ZETIA) 10 MG tablet Take 1  tablet by mouth Daily. 11/23/22  Yes Marcie Robbins MD   FeroSul 325 (65 Fe) MG tablet TAKE ONE TABLET BY MOUTH DAILY WITH BREAKFAST 10/19/22  Yes Matt Rose MD   fluticasone (FLONASE) 50 MCG/ACT nasal spray 2 sprays into the nostril(s) as directed by provider At Night As Needed for Allergies. 9/29/17  Yes Jane Mcclellan MD   fluticasone-salmeterol (ADVAIR HFA) 230-21 MCG/ACT inhaler Inhale 2 puffs 2 (Two) Times a Day.   Yes Jane Mcclellan MD   glucosamine-chondroitin 500-400 MG capsule capsule Take 1 capsule by mouth Daily. 1/1/21  Yes Jane Mcclellan MD   guaiFENesin (MUCINEX) 600 MG 12 hr tablet Take 1 tablet by mouth 2 (Two) Times a Day.   Yes Jane Mcclellan MD   ipratropium-albuterol (DUO-NEB) 0.5-2.5 mg/3 ml nebulizer Take 3 mL by nebulization 2 (Two) Times a Day. SOB/WHEEZING 8/1/22  Yes Jane Mcclellan MD   Lactobacillus (FLORAJEN ACIDOPHILUS) capsule Take 1 tablet by mouth Daily.   Yes Jane Mcclellan MD   losartan (COZAAR) 50 MG tablet Take 2 tablets by mouth Daily.   Yes Jane Mcclellan MD   metoprolol tartrate (LOPRESSOR) 50 MG tablet Take 1 tablet by mouth Every 12 (Twelve) Hours. 2/20/23  Yes Mayela Weiss APRN   Multiple Vitamins-Minerals (PRESERVISION AREDS PO) Take 1 tablet by mouth 2 (Two) Times a Day. 8/12/22  Yes Jane Mcclellan MD   potassium chloride (MICRO-K) 10 MEQ CR capsule Take 1 capsule by mouth Daily. 9/27/22  Yes Marcie Robbins MD   sodium chloride 0.9 % nebulizer solution Take 3 mL by nebulization 2 (Two) Times a Day.   Yes Jane Mcclellan MD   torsemide (DEMADEX) 20 MG tablet Take 1 tablet by mouth 2 (Two) Times a Day. 9/27/22  Yes Marcie Robbins MD   O2 (OXYGEN) Inhale 3 L/min Every Night. 7/20/22   ProviderJane MD       Allergies   Allergen Reactions   • Amlodipine Besylate Swelling   • Aspirin GI Intolerance     Caused bleeding ulcers   • Bactrim [Sulfamethoxazole-Trimethoprim] Nausea And Vomiting    • Erythromycin Unknown (See Comments)     Patient does not recall type of reaction.   • Levaquin [Levofloxacin] Unknown (See Comments)     Patient does not recall what type of reaction.   • Nitrofurantoin Nausea Only and Nausea And Vomiting   • Ramipril Other (See Comments)     Cough       Social History     Socioeconomic History   • Marital status:    Tobacco Use   • Smoking status: Never   • Smokeless tobacco: Never   • Tobacco comments:     caffiene daily   Vaping Use   • Vaping Use: Never used   Substance and Sexual Activity   • Alcohol use: Yes     Alcohol/week: 2.0 standard drinks     Types: 1 Glasses of wine, 1 Shots of liquor per week     Comment: occasional/  Daily caffeine use   • Drug use: No   • Sexual activity: Yes     Partners: Male       Family History   Problem Relation Age of Onset   • Hypertension Mother    • Stroke Mother    • Hypertension Father    • Cancer Son    • Cancer Brother        REVIEW OF SYSTEMS:   All systems reviewed.  Pertinent positives identified in HPI.  All other systems are negative.      Objective:     Vitals:    04/04/23 0815 04/04/23 0825 04/04/23 1115 04/04/23 1116   BP: 116/71 116/71     BP Location: Right arm      Patient Position: Lying      Pulse: 104 108 107 97   Resp: 20  20    Temp: 99.2 °F (37.3 °C)      TempSrc: Oral      SpO2: 96%  97% 96%   Weight:       Height:         Body mass index is 22.13 kg/m².    Physical Exam:  Constitutional: She is oriented to person, place, and time. She appears well-developed. She does not appear ill.   HENT:   Head: Normocephalic and atraumatic. Head is without contusion.   Right Ear: Hearing normal. No drainage.   Left Ear: Hearing normal. No drainage.   Nose: No nasal deformity. No epistaxis.   Eyes: Lids are normal. Right eye exhibits no exudate. Left eye exhibits no exudate.  Neck: No JVD present.  Cardiovascular: Normal rate, irregular rhythm and normal heart sounds.    Pulses:       Posterior tibial pulses are 2+ on  the right side, and 2+ on the left side.   Pulmonary/Chest: Effort normal and breath sounds normal.   Abdominal: Soft. Normal appearance and bowel sounds are normal. There is no tenderness.   Musculoskeletal: Normal range of motion.        Right shoulder: She exhibits no deformity.        Left shoulder: She exhibits no deformity.   Neurological: She is alert and oriented to person, place, and time. She has normal strength.   Skin: Skin is warm, dry and intact. No rash noted.   Psychiatric: She has a normal mood and affect. Her behavior is normal. Thought content normal.   Vitals reviewed      Lab Review:     Results from last 7 days   Lab Units 04/04/23  0215 04/03/23  1851   SODIUM mmol/L 135* 135*   POTASSIUM mmol/L 2.9* 3.3*   CHLORIDE mmol/L 100 93*   CO2 mmol/L 23.0 30.2*   BUN mg/dL 20 21   CREATININE mg/dL 0.94 1.26*   CALCIUM mg/dL 8.7 9.7*   BILIRUBIN mg/dL  --  1.1   ALK PHOS U/L  --  107   ALT (SGPT) U/L  --  16   AST (SGOT) U/L  --  22   GLUCOSE mg/dL 111* 140*     Results from last 7 days   Lab Units 04/04/23  0215 04/04/23  0015 04/03/23  1851   HSTROP T ng/L 25* 26* 30*     Results from last 7 days   Lab Units 04/04/23  0215   WBC 10*3/mm3 7.08   HEMOGLOBIN g/dL 11.3*   HEMATOCRIT % 35.5   PLATELETS 10*3/mm3 199         Results from last 7 days   Lab Units 04/04/23  0900   MAGNESIUM mg/dL 2.5*                          Current Facility-Administered Medications:   •  acetaminophen (TYLENOL) tablet 650 mg, 650 mg, Oral, Q4H PRN, Hayden James, PJ  •  albuterol (PROVENTIL) nebulizer solution 0.083% 2.5 mg/3mL, 2.5 mg, Nebulization, Q6H PRN, Yi Kang, PJ  •  aluminum-magnesium hydroxide-simethicone (MAALOX MAX) 400-400-40 MG/5ML suspension 15 mL, 15 mL, Oral, Q6H PRN, Hayden James, PJ  •  apixaban (ELIQUIS) tablet 2.5 mg, 2.5 mg, Oral, Q12H, Hayden James, APRN, 2.5 mg at 04/04/23 0821  •  atorvastatin (LIPITOR) tablet 40 mg, 40 mg, Oral, Daily, Hayden James,  APRN, 40 mg at 04/04/23 0821  •  benzonatate (TESSALON) capsule 200 mg, 200 mg, Oral, TID PRN, Kiara Zhao MD, 200 mg at 04/04/23 0826  •  sennosides-docusate (PERICOLACE) 8.6-50 MG per tablet 2 tablet, 2 tablet, Oral, BID, 2 tablet at 04/04/23 0024 **AND** polyethylene glycol (MIRALAX) packet 17 g, 17 g, Oral, Daily PRN **AND** bisacodyl (DULCOLAX) EC tablet 5 mg, 5 mg, Oral, Daily PRN **AND** bisacodyl (DULCOLAX) suppository 10 mg, 10 mg, Rectal, Daily PRN, Hayden James APRN  •  budesonide-formoterol (SYMBICORT) 160-4.5 MCG/ACT inhaler 2 puff, 2 puff, Inhalation, BID - RT, Yi Kang APRN  •  cefTRIAXone (ROCEPHIN) 1 g in sodium chloride 0.9 % 100 mL IVPB-VTB, 1 g, Intravenous, Q24H, Hayden James APRN  •  cetirizine (zyrTEC) tablet 10 mg, 10 mg, Oral, Daily, Yi Kang APRN, 10 mg at 04/04/23 1229  •  cholecalciferol (VITAMIN D3) tablet 1,000 Units, 1,000 Units, Oral, Daily, Yi Kang APRN, 1,000 Units at 04/04/23 1229  •  citalopram (CeleXA) tablet 10 mg, 10 mg, Oral, Daily, Yi Kang APRN, 10 mg at 04/04/23 1229  •  dextromethorphan polistirex ER (DELSYM) 30 MG/5ML oral suspension 60 mg, 60 mg, Oral, Q12H, Yi Kang APRN, 60 mg at 04/04/23 1229  •  digoxin (LANOXIN) injection 250 mcg, 250 mcg, Intravenous, Q6H, Rosalba Morgan APRN  •  ferrous sulfate tablet 325 mg, 325 mg, Oral, Daily With Breakfast, Yi Kang APRN, 325 mg at 04/04/23 1229  •  fluticasone (FLONASE) 50 MCG/ACT nasal spray 2 spray, 2 spray, Nasal, Nightly PRN, Yi Kang APRN  •  guaiFENesin (MUCINEX) 12 hr tablet 600 mg, 600 mg, Oral, BID, Yi Kang APRN, 600 mg at 04/04/23 1229  •  ipratropium-albuterol (DUO-NEB) nebulizer solution 3 mL, 3 mL, Nebulization, 4x Daily - RT, Yi Kang APRN, 3 mL at 04/04/23 1115  •  lactobacillus acidophilus (RISAQUAD) capsule 1 capsule, 1 capsule, Oral, Daily, Jorge Luis  PJ Da Silva, 1 capsule at 04/04/23 1229  •  melatonin tablet 3 mg, 3 mg, Oral, Nightly PRN, Hayden James APRN  •  metoprolol tartrate (LOPRESSOR) tablet 50 mg, 50 mg, Oral, Q12H, Hayden James APRN, 50 mg at 04/04/23 0825  •  nitroglycerin (NITROSTAT) SL tablet 0.4 mg, 0.4 mg, Sublingual, Q5 Min PRN, Hayden James APRN  •  ondansetron (ZOFRAN) tablet 4 mg, 4 mg, Oral, Q6H PRN **OR** ondansetron (ZOFRAN) injection 4 mg, 4 mg, Intravenous, Q6H PRN, Hayden James APRN  •  potassium chloride (K-DUR,KLOR-CON) ER tablet 10 mEq, 10 mEq, Oral, Daily, Yi Kang APRN  •  [COMPLETED] Insert Peripheral IV, , , Once **AND** sodium chloride 0.9 % flush 10 mL, 10 mL, Intravenous, PRN, Lexx Espitia MD  •  sodium chloride 0.9 % flush 10 mL, 10 mL, Intravenous, PRN, Hayden James APRN  •  sodium chloride 0.9 % nebulizer solution 3 mL, 3 mL, Nebulization, BID, Yi Kang APRN, 3 mL at 04/04/23 1116  •  torsemide (DEMADEX) tablet 20 mg, 20 mg, Oral, BID, Yi Kang APRN, 20 mg at 04/04/23 1229    Assessment and Plan:       Active Hospital Problems    Diagnosis  POA   • **Sepsis due to pneumonia [J18.9, A41.9]  Yes   • COPD (chronic obstructive pulmonary disease) [J44.9]  Yes   • Bronchiectasis [J47.9]  Yes   • Hypokalemia [E87.6]  Yes   • Chronic diastolic CHF (congestive heart failure) (HCC) [I50.32]  Yes   • Acute on chronic respiratory failure with hypoxia [J96.21]  Yes   • Primary hypertension [I10]  Yes      Resolved Hospital Problems   No resolved problems to display.     1. Acute hypoxic respiratory failure - being treated for pna. Fever, productive cough.   2. Persistent atrial fibrillation - rate rapid. Exacerbated by #1. Will give some IV digoxin and follow. Continue current dose of beta blocker. May be able to titrate - follow BP closely.   3. Chronic diastolic heart failure - on oral torsemide. BNP is a little elevated. Volume possibly  exacerbated by RVR. Will follow.   4. Mild non obstructive coronary artery disease by cath 2007. Normal stress 2018. No anginal symptoms.   5. Elevated high sensitivity troponin , flat trend. Do not suspect acute coronary syndrome  6. Moderate mitral valve regurgitation  7. Hypertension     Rosalba Morgan, PJ  04/04/23  12:30 EDT

## 2023-04-04 NOTE — H&P
Patient Name:  Agnieszka Rizzo  YOB: 1930  MRN:  [id number]  Admit Date:  4/3/2023  Patient Care Team:  Matt Rose MD as PCP - General (Family Medicine)  Marcie Robbins MD as Consulting Physician (Cardiology)  iNlesh Danielle MD as Consulting Physician (Urology)  Lina Morris [facility] as Pharmacist  Lev Mckeon, PharmD as Pharmacist (Pharmacy)  Rogelio Tucker MD as Consulting Physician (Pulmonary Disease)      Subjective   History Present Illness     Chief Complaint   Patient presents with   • Cough   • Fever   • Shortness of Breath       Ms. Rizzo is a 89 y.o. female with a history of afib, CHF, CAD, COPD/bronchiectasis, nocturnal hypoxia, HTN, HLD that presents to [facility] complaining of cough, soa, fever. She has felt ill for nearly a week. She has an oximeter at home and has been noticing low O2 sats during the day. She uses O2 w/sleep at baseline. Cough has been mostly nonproductive. Denies chest pain, palpitations, n/v/d, dysuria. Self covid test yesterday reported as negative.    TMax 102.8. HR up to 140 in ED, most recent 108. On room air. WBC 9.54, Hgb 16.8. Procal 0.19. BC collected. BNP 3211. HS trop 30-->26-->25. RVP neg. Lactate 1.1. Gluc 140, Cr 1.26, Na 135, K 3.3, Cl 93, CO2 30.2, ca 9.7. UA 1+ blood. CXR: patch densities mid lungs could be developing infiltrates; cardiomegaly.    Review of Systems   Constitutional: Positive for fatigue and fever.   HENT: Negative for congestion.    Respiratory: Positive for cough and shortness of breath.    Cardiovascular: Negative for chest pain and palpitations.   Gastrointestinal: Negative for diarrhea, nausea and vomiting.   Genitourinary: Negative for dysuria.   Musculoskeletal: Negative for arthralgias.   Skin: Negative for rash.   Neurological: Negative for headaches.   Psychiatric/Behavioral: Negative for sleep disturbance.        Personal History     Past Medical History:   Diagnosis Date   • Aspergillus    •  Asthma    • Atrial fibrillation     chronic   • Atrial flutter    • Bronchiectasis    • C. difficile diarrhea 03/11/2017   • CAD (coronary artery disease)     nonobstructive   • Chronic diastolic CHF (congestive heart failure)    • Colitis    • COPD (chronic obstructive pulmonary disease)    • Cough    • Cryoglobulinemia    • Dyspnea on exertion    • Fall    • Hyperlipidemia    • Hypertension    • Hyponatremia    • Hypoxia    • Infectious viral hepatitis     AGE 13   • Left shoulder pain    • Leg swelling    • Lesion of lung    • Malignant hyperthermia due to anesthesia    • Mild tricuspid regurgitation    • MR (mitral regurgitation)     mild   • MVP (mitral valve prolapse)    • Permanent atrial fibrillation    • Pneumonia with the fungal infection aspergillosis 01/06/2017   • Pneumothorax    • SOB (shortness of breath)    • UTI (urinary tract infection)    • Wheeze     mild     Past Surgical History:   Procedure Laterality Date   • BRONCHOSCOPY N/A 11/12/2016    Procedure: BRONCHOSCOPY WITH FLUORO, BRUSHINGS, BAL, AND BIOPSIES;  Surgeon: Rogelio Tucker MD;  Location: [facility] ENDOSCOPY;  Service:    • BRONCHOSCOPY Bilateral 6/3/2017    Procedure: BRONCHOSCOPY with BAL ;  Surgeon: Sung King MD;  Location: [facility] ENDOSCOPY;  Service:    • BRONCHOSCOPY N/A 12/17/2019    Procedure: BRONCHOSCOPY WITH WASHINGS;  Surgeon: Rogelio Tucker MD;  Location: [facility] ENDOSCOPY;  Service: Pulmonary   • BRONCHOSCOPY N/A 7/15/2022    Procedure: BRONCHOSCOPY;  Surgeon: Rogelio Tucker MD;  Location: [facility] ENDOSCOPY;  Service: Pulmonary;  Laterality: N/A;  Pre: Pneumonia  Post: Pneumonia   • CATARACT EXTRACTION EXTRACAPSULAR W/ INTRAOCULAR LENS IMPLANTATION     • COLONOSCOPY      2013   • D & C WITH SUCTION     • HYSTERECTOMY     • KNEE ARTHROSCOPY Left      Family History   Problem Relation Age of Onset   • Hypertension Mother    • Stroke Mother    • Hypertension Father    • Cancer Son    • Cancer Brother      Social History     Tobacco  Use   • Smoking status: Never   • Smokeless tobacco: Never   • Tobacco comments:     caffiene daily   Vaping Use   • Vaping Use: Never used   Substance Use Topics   • Alcohol use: Yes     Alcohol/week: 2.0 standard drinks     Types: 1 Glasses of wine, 1 Shots of liquor per week     Comment: occasional/  Daily caffeine use   • Drug use: No     No current facility-administered medications on file prior to encounter.     Current Outpatient Medications on File Prior to Encounter   Medication Sig Dispense Refill   • acetaminophen (TYLENOL) 325 MG tablet Take 2 tablets by mouth Every 4 (Four) Hours As Needed for Moderate Pain.     • albuterol sulfate  (90 Base) MCG/ACT inhaler Inhale 2 puffs Every 6 (Six) Hours As Needed for Wheezing.     • apixaban (ELIQUIS) 2.5 MG tablet tablet Take 1 tablet by mouth Every 12 (Twelve) Hours. 14 tablet 0   • atorvastatin (LIPITOR) 40 MG tablet Take 1 tablet by mouth Daily. 90 tablet 3   • azithromycin (ZITHROMAX) 250 MG tablet Take 1 tablet by mouth Daily.     • B Complex Vitamins (VITAMIN B COMPLEX PO) Take 1 tablet by mouth Daily. TAKES VIT B12 1000MCG DAILY     • calcium carbonate (OS-EVIN) 600 MG tablet Take 1 tablet by mouth Daily. With vitamin D     • cetirizine (zyrTEC) 10 MG tablet Take 1 tablet by mouth Daily.     • cholecalciferol (VITAMIN D3) 25 MCG (1000 UT) tablet Take 1 tablet by mouth Daily.     • citalopram (CeleXA) 10 MG tablet Take 1 tablet by mouth Daily. 90 tablet 2   • ezetimibe (ZETIA) 10 MG tablet Take 1 tablet by mouth Daily. 90 tablet 3   • FeroSul 325 (65 Fe) MG tablet TAKE ONE TABLET BY MOUTH DAILY WITH BREAKFAST 90 tablet 0   • fluticasone (FLONASE) 50 MCG/ACT nasal spray 2 sprays into the nostril(s) as directed by provider At Night As Needed for Allergies.     • fluticasone-salmeterol (ADVAIR HFA) 230-21 MCG/ACT inhaler Inhale 2 puffs 2 (Two) Times a Day.     • glucosamine-chondroitin 500-400 MG capsule capsule Take 1 capsule by mouth Daily.     •  guaiFENesin (MUCINEX) 600 MG 12 hr tablet Take 1 tablet by mouth 2 (Two) Times a Day.     • ipratropium-albuterol (DUO-NEB) 0.5-2.5 mg/3 ml nebulizer Take 3 mL by nebulization 2 (Two) Times a Day. SOB/WHEEZING     • Lactobacillus (FLORAJEN ACIDOPHILUS) capsule Take 1 tablet by mouth Daily.     • losartan (COZAAR) 50 MG tablet Take 2 tablets by mouth Daily.     • metoprolol tartrate (LOPRESSOR) 50 MG tablet Take 1 tablet by mouth Every 12 (Twelve) Hours. 180 tablet 3   • Multiple Vitamins-Minerals (PRESERVISION AREDS PO) Take 1 tablet by mouth 2 (Two) Times a Day.     • potassium chloride (MICRO-K) 10 MEQ CR capsule Take 1 capsule by mouth Daily. 90 capsule 3   • sodium chloride 0.9 % nebulizer solution Take 3 mL by nebulization 2 (Two) Times a Day.     • torsemide (DEMADEX) 20 MG tablet Take 1 tablet by mouth 2 (Two) Times a Day. 180 tablet 3   • O2 (OXYGEN) Inhale 3 L/min Every Night.       Allergies   Allergen Reactions   • Amlodipine Besylate Swelling   • Aspirin GI Intolerance     Caused bleeding ulcers   • Bactrim [Sulfamethoxazole-Trimethoprim] Nausea And Vomiting   • Erythromycin Unknown (See Comments)     Patient does not recall type of reaction.   • Levaquin [Levofloxacin] Unknown (See Comments)     Patient does not recall what type of reaction.   • Nitrofurantoin Nausea Only and Nausea And Vomiting   • Ramipril Other (See Comments)     Cough       Objective    Objective     Vital Signs  Temp:  [97.6 °F (36.4 °C)-102.8 °F (39.3 °C)] 99.2 °F (37.3 °C)  Heart Rate:  [] 97  Resp:  [18-22] 20  BP: ()/(58-89) 116/71  SpO2:  [87 %-97 %] 96 %  on  Flow (L/min):  [2-3] 2;   Device (Oxygen Therapy): nasal cannula  Body mass index is 22.13 kg/m².    Physical Exam  Vitals and nursing note reviewed.   Constitutional:       General: She is not in acute distress.     Appearance: She is ill-appearing. She is not toxic-appearing.   HENT:      Head: Normocephalic.      Mouth/Throat:      Mouth: Mucous membranes  are moist.   Eyes:      Conjunctiva/sclera: Conjunctivae normal.   Cardiovascular:      Rate and Rhythm: Tachycardia present. Rhythm irregular.   Pulmonary:      Effort: Pulmonary effort is normal. No respiratory distress.      Breath sounds: Wheezing and rales present.   Abdominal:      General: Bowel sounds are normal.      Palpations: Abdomen is soft.   Musculoskeletal:      Cervical back: Neck supple.      Right lower leg: No edema.      Left lower leg: No edema.   Skin:     General: Skin is warm and dry.   Neurological:      Mental Status: She is alert and oriented to person, place, and time.   Psychiatric:         Mood and Affect: Mood normal.         Behavior: Behavior normal.         Results Review:  I reviewed the patient's new clinical results.  I reviewed the patient's new imaging results and agree with the interpretation.  I reviewed the patient's other test results and agree with the interpretation  I personally viewed and interpreted the patient's EKG/Telemetry data  Discussed with ED provider.    Lab Results (last 24 hours)     Procedure Component Value Units Date/Time    CBC & Differential [[id number]]  (Abnormal) Collected: 04/03/23 1851    Specimen: Blood Updated: 04/03/23 1918    Narrative:      The following orders were created for panel order CBC & Differential.  Procedure                               Abnormality         Status                     ---------                               -----------         ------                     CBC Auto Differential[[id number]]        Abnormal            Final result                 Please view results for these tests on the individual orders.    Comprehensive Metabolic Panel [[id number]]  (Abnormal) Collected: 04/03/23 1851    Specimen: Blood Updated: 04/03/23 1931     Glucose 140 mg/dL      BUN 21 mg/dL      Creatinine 1.26 mg/dL      Sodium 135 mmol/L      Potassium 3.3 mmol/L      Chloride 93 mmol/L      CO2 30.2 mmol/L      Calcium 9.7 mg/dL      Total  Protein 7.6 g/dL      Albumin 4.2 g/dL      ALT (SGPT) 16 U/L      AST (SGOT) 22 U/L      Alkaline Phosphatase 107 U/L      Total Bilirubin 1.1 mg/dL      Globulin 3.4 gm/dL      A/G Ratio 1.2 g/dL      BUN/Creatinine Ratio 16.7     Anion Gap 11.8 mmol/L      eGFR 40.1 mL/min/1.73     Narrative:      GFR Normal >60  Chronic Kidney Disease <60  Kidney Failure <15    The GFR formula is only valid for adults with stable renal function between ages 18 and 70.    Single High Sensitivity Troponin T [[id number]]  (Abnormal) Collected: 04/03/23 1851    Specimen: Blood Updated: 04/03/23 1938     HS Troponin T 30 ng/L     Narrative:      High Sensitive Troponin T Reference Range:  <10.0 ng/L- Negative Female for AMI  <15.0 ng/L- Negative Male for AMI  >=10 - Abnormal Female indicating possible myocardial injury.  >=15 - Abnormal Male indicating possible myocardial injury.   Clinicians would have to utilize clinical acumen, EKG, Troponin, and serial changes to determine if it is an Acute Myocardial Infarction or myocardial injury due to an underlying chronic condition.         BNP [[id number]]  (Abnormal) Collected: 04/03/23 1851    Specimen: Blood Updated: 04/03/23 1938     proBNP 3,211.0 pg/mL     Narrative:      Among patients with dyspnea, NT-proBNP is highly sensitive for the detection of acute congestive heart failure. In addition NT-proBNP of <300 pg/ml effectively rules out acute congestive heart failure with 99% negative predictive value.    Results may be falsely decreased if patient taking Biotin.      Blood Culture - Blood, Arm, Left [[id number]] Collected: 04/03/23 1851    Specimen: Blood from Arm, Left Updated: 04/03/23 1904    Procalcitonin [[id number]]  (Normal) Collected: 04/03/23 1851    Specimen: Blood Updated: 04/03/23 1938     Procalcitonin 0.19 ng/mL     Narrative:      As a Marker for Sepsis (Non-Neonates):    1. <0.5 ng/mL represents a low risk of severe sepsis and/or septic shock.  2. >2 ng/mL represents a  "high risk of severe sepsis and/or septic shock.    As a Marker for Lower Respiratory Tract Infections that require antibiotic therapy:    PCT on Admission    Antibiotic Therapy       6-12 Hrs later    >0.5                Strongly Recommended  >0.25 - <0.5        Recommended   0.1 - 0.25          Discouraged              Remeasure/reassess PCT  <0.1                Strongly Discouraged     Remeasure/reassess PCT    As 28 day mortality risk marker: \"Change in Procalcitonin Result\" (>80% or <=80%) if Day 0 (or Day 1) and Day 4 values are available. Refer to http://www.[facility]-pct-calculator.com    Change in PCT <=80%  A decrease of PCT levels below or equal to 80% defines a positive change in PCT test result representing a higher risk for 28-day all-cause mortality of patients diagnosed with severe sepsis for septic shock.    Change in PCT >80%  A decrease of PCT levels of more than 80% defines a negative change in PCT result representing a lower risk for 28-day all-cause mortality of patients diagnosed with severe sepsis or septic shock.       CBC Auto Differential [[id number]]  (Abnormal) Collected: 04/03/23 1851    Specimen: Blood Updated: 04/03/23 1918     WBC 9.54 10*3/mm3      RBC 5.53 10*6/mm3      Hemoglobin 16.8 g/dL      Hematocrit 51.8 %      MCV 93.7 fL      MCH 30.4 pg      MCHC 32.4 g/dL      RDW 13.7 %      RDW-SD 46.5 fl      MPV 9.6 fL      Platelets 191 10*3/mm3      Neutrophil % 73.8 %      Lymphocyte % 14.3 %      Monocyte % 9.9 %      Eosinophil % 0.6 %      Basophil % 1.0 %      Immature Grans % 0.4 %      Neutrophils, Absolute 7.04 10*3/mm3      Lymphocytes, Absolute 1.36 10*3/mm3      Monocytes, Absolute 0.94 10*3/mm3      Eosinophils, Absolute 0.06 10*3/mm3      Basophils, Absolute 0.10 10*3/mm3      Immature Grans, Absolute 0.04 10*3/mm3      nRBC 1.5 /100 WBC     Lactic Acid, Plasma [[id number]]  (Normal) Collected: 04/03/23 1852    Specimen: Blood Updated: 04/03/23 1926     Lactate 1.1 mmol/L  "    Respiratory Panel PCR w/COVID-19(SARS-CoV-2) ANAI/JORGE/THOM/PAD/COR/MAD/NIKHIL In-House, NP Swab in UTM/VTM, 3-4 HR TAT - Swab, Nasopharynx [[id number]]  (Normal) Collected: 04/03/23 1855    Specimen: Swab from Nasopharynx Updated: 04/03/23 1958     ADENOVIRUS, PCR Not Detected     Coronavirus 229E Not Detected     Coronavirus HKU1 Not Detected     Coronavirus NL63 Not Detected     Coronavirus OC43 Not Detected     COVID19 Not Detected     Human Metapneumovirus Not Detected     Human Rhinovirus/Enterovirus Not Detected     Influenza A PCR Not Detected     Influenza B PCR Not Detected     Parainfluenza Virus 1 Not Detected     Parainfluenza Virus 2 Not Detected     Parainfluenza Virus 3 Not Detected     Parainfluenza Virus 4 Not Detected     RSV, PCR Not Detected     Bordetella pertussis pcr Not Detected     Bordetella parapertussis PCR Not Detected     Chlamydophila pneumoniae PCR Not Detected     Mycoplasma pneumo by PCR Not Detected    Narrative:      In the setting of a positive respiratory panel with a viral infection PLUS a negative procalcitonin without other underlying concern for bacterial infection, consider observing off antibiotics or discontinuation of antibiotics and continue supportive care. If the respiratory panel is positive for atypical bacterial infection (Bordetella pertussis, Chlamydophila pneumoniae, or Mycoplasma pneumoniae), consider antibiotic de-escalation to target atypical bacterial infection.    Blood Culture - Blood, Arm, Right [[id number]] Collected: 04/03/23 1856    Specimen: Blood from Arm, Right Updated: 04/03/23 1905    High Sensitivity Troponin T [[id number]]  (Abnormal) Collected: 04/04/23 0015    Specimen: Blood Updated: 04/04/23 0109     HS Troponin T 26 ng/L     Narrative:      High Sensitive Troponin T Reference Range:  <10.0 ng/L- Negative Female for AMI  <15.0 ng/L- Negative Male for AMI  >=10 - Abnormal Female indicating possible myocardial injury.  >=15 - Abnormal Male  indicating possible myocardial injury.   Clinicians would have to utilize clinical acumen, EKG, Troponin, and serial changes to determine if it is an Acute Myocardial Infarction or myocardial injury due to an underlying chronic condition.         Urinalysis With Microscopic If Indicated (No Culture) - Urine, Clean Catch [[id number]]  (Abnormal) Collected: 04/04/23 0036    Specimen: Urine, Clean Catch Updated: 04/04/23 0056     Color, UA Yellow     Appearance, UA Clear     pH, UA 6.5     Specific Gravity, UA 1.006     Glucose, UA Negative     Ketones, UA Trace     Bilirubin, UA Negative     Blood, UA Small (1+)     Protein, UA Negative     Leuk Esterase, UA Negative     Nitrite, UA Negative     Urobilinogen, UA 0.2 E.U./dL    Urinalysis, Microscopic Only - Urine, Clean Catch [[id number]]  (Abnormal) Collected: 04/04/23 0036    Specimen: Urine, Clean Catch Updated: 04/04/23 0056     RBC, UA 3-5 /HPF      WBC, UA 0-2 /HPF      Bacteria, UA None Seen /HPF      Squamous Epithelial Cells, UA 0-2 /HPF      Hyaline Casts, UA None Seen /LPF      Methodology Automated Microscopy    S. Pneumo Ag Urine or CSF - Urine, Urine, Clean Catch [[id number]]  (Normal) Collected: 04/04/23 0036    Specimen: Urine, Clean Catch Updated: 04/04/23 0952     Strep Pneumo Ag Negative    Legionella Antigen, Urine - Urine, Urine, Clean Catch [[id number]]  (Normal) Collected: 04/04/23 0036    Specimen: Urine, Clean Catch Updated: 04/04/23 0952     LEGIONELLA ANTIGEN, URINE Negative    Basic Metabolic Panel [[id number]]  (Abnormal) Collected: 04/04/23 0215    Specimen: Blood Updated: 04/04/23 0324     Glucose 111 mg/dL      BUN 20 mg/dL      Creatinine 0.94 mg/dL      Sodium 135 mmol/L      Potassium 2.9 mmol/L      Comment: Slight hemolysis detected by analyzer. Results may be affected.        Chloride 100 mmol/L      CO2 23.0 mmol/L      Calcium 8.7 mg/dL      BUN/Creatinine Ratio 21.3     Anion Gap 12.0 mmol/L      eGFR 57.0 mL/min/1.73     Narrative:       GFR Normal >60  Chronic Kidney Disease <60  Kidney Failure <15    The GFR formula is only valid for adults with stable renal function between ages 18 and 70.    CBC (No Diff) [[id number]]  (Abnormal) Collected: 04/04/23 0215    Specimen: Blood Updated: 04/04/23 0323     WBC 7.08 10*3/mm3      RBC 3.62 10*6/mm3      Hemoglobin 11.3 g/dL      Hematocrit 35.5 %      MCV 98.1 fL      MCH 31.2 pg      MCHC 31.8 g/dL      RDW 13.9 %      RDW-SD 50.2 fl      MPV 9.9 fL      Platelets 199 10*3/mm3     High Sensitivity Troponin T 2Hr [[id number]]  (Abnormal) Collected: 04/04/23 0215    Specimen: Blood Updated: 04/04/23 0324     HS Troponin T 25 ng/L      Troponin T Delta -1 ng/L     Narrative:      High Sensitive Troponin T Reference Range:  <10.0 ng/L- Negative Female for AMI  <15.0 ng/L- Negative Male for AMI  >=10 - Abnormal Female indicating possible myocardial injury.  >=15 - Abnormal Male indicating possible myocardial injury.   Clinicians would have to utilize clinical acumen, EKG, Troponin, and serial changes to determine if it is an Acute Myocardial Infarction or myocardial injury due to an underlying chronic condition.         Magnesium [[id number]]  (Abnormal) Collected: 04/04/23 0900    Specimen: Blood Updated: 04/04/23 1009     Magnesium 2.5 mg/dL     MRSA Screen, PCR (Inpatient) - Swab, Nares [[id number]] Collected: 04/04/23 1011    Specimen: Swab from Nares Updated: 04/04/23 1030          Imaging Results (Last 24 Hours)     Procedure Component Value Units Date/Time    XR Chest 1 View [[id number]] Collected: 04/03/23 1932     Updated: 04/03/23 1937    Narrative:      XR CHEST 1 VW-     HISTORY: Female who is 89 years-old,  fever, dyspnea     TECHNIQUE: Frontal view of the chest     COMPARISON: 07/15/2022     FINDINGS: The heart is enlarged. Aorta is calcified. Pulmonary  vasculature is unremarkable. Patchy density at the mid lungs could be  areas of developing infiltrate, follow-up suggested. No  pleural  effusion, or pneumothorax. No acute osseous process.       Impression:      Patchy densities at the mid lungs could be areas of  developing infiltrate, follow-up suggested. Cardiomegaly.     This report was finalized on 4/3/2023 7:34 PM by Dr. Steven Garza M.D.             Results for orders placed during the hospital encounter of 07/09/22    Adult Transthoracic Echo Complete W/ Cont if Necessary Per Protocol    Interpretation Summary  · Calculated left ventricular EF = 55.2% Estimated left ventricular EF was in agreement with the calculated left ventricular EF. Left ventricular systolic function is normal.  · Left ventricular wall thickness is consistent with moderate concentric hypertrophy.  · Left atrial volume is moderately increased.  · The right atrial cavity is severely dilated.  · Moderate mitral valve regurgitation is present with an eccentric jet noted.  · Moderate tricuspid valve regurgitation is present.  · Estimated right ventricular systolic pressure from tricuspid regurgitation is normal (<35 mmHg). Calculated right ventricular systolic pressure from tricuspid regurgitation is 31 mmHg.      ECG 12 Lead Dyspnea   Preliminary Result   HEART RATE= 130  bpm   RR Interval= 462  ms   KS Interval=   ms   P Horizontal Axis=   deg   P Front Axis=   deg   QRSD Interval= 80  ms   QT Interval= 273  ms   QRS Axis= -3  deg   T Wave Axis= 175  deg   - ABNORMAL ECG -   Atrial fibrillation   Repolarization abnormality, prob rate related   Electronically Signed By:    Date and Time of Study: 2023-04-03 18:48:12           Assessment/Plan     Active Hospital Problems    Diagnosis  POA   • **Sepsis due to pneumonia [J18.9, A41.9]  Yes   • COPD (chronic obstructive pulmonary disease) [J44.9]  Yes   • Bronchiectasis [J47.9]  Yes   • Hypokalemia [E87.6]  Yes   • Chronic diastolic CHF (congestive heart failure) ([facility]) [I50.32]  Yes   • Acute on chronic respiratory failure with hypoxia [J96.21]  Yes   • Primary  hypertension [I10]  Yes      Resolved Hospital Problems   No resolved problems to display.       Ms. Rizzo is a 89 y.o. female with a history of afib, CHF, CAD, COPD/bronchiectasis, nocturnal hypoxia, HTN, HLD who is admitted for pneumonia, afib with RVR, acute on chronic respiratory failure w/hypoxia    Pneumonia w/sepsis/COPD/Bronchiectasis/Acute on chronic respiratory failure w/hypoxia:  -Continue antibiotics for now. Procal, WBC wnl. Monitor fever curve. Follow BC. Urine antigens, RVP negative. Wean O2 during day as tolerated. Consult Pulmnology to follow. Continue nebs, inhalers, mucolytic. Add cough suppressant. Encourage IS, FV     Afib/Chronic diastolic CHF:  -Cardiology consult. Monitor on telemetry. Continue metoprolol, eliquis, torsemide. Strict I&O, daily weights. Echo 7/2022    Hypokalemia:  -Replete per protocol. Check Mg    HTN:  -BP lower 90s-110s systolic. Hold losartan for now    · I discussed the patient's findings and my recommendations with patient and family.    VTE Prophylaxis - Eliquis (home med).  Code Status - Full code.       PJ Stern  [facility]  04/04/23  11:44 EDT

## 2023-04-04 NOTE — ED NOTES
Nursing report ED to floor  Agnieszka Rizzo  89 y.o.  female    HPI :   Chief Complaint   Patient presents with    Cough    Fever    Shortness of Breath       Admitting doctor:   Armando Valladares MD    Admitting diagnosis:   The primary encounter diagnosis was Sepsis due to pneumonia. Diagnoses of Atrial fibrillation with RVR, Fever, unspecified fever cause, and ROB (acute kidney injury) were also pertinent to this visit.    Code status:   Current Code Status       Date Active Code Status Order ID Comments User Context       Prior            Allergies:   Amlodipine besylate, Aspirin, Bactrim [sulfamethoxazole-trimethoprim], Erythromycin, Levaquin [levofloxacin], Nitrofurantoin, and Ramipril    Isolation:   Enhanced Droplet/Contact     Intake and Output  No intake or output data in the 24 hours ending 04/03/23 2110    Weight:       04/03/23  1844   Weight: 57.2 kg (126 lb 3.2 oz)       Most recent vitals:   Vitals:    04/03/23 2023 04/03/23 2039 04/03/23 2101 04/03/23 2109   BP:   105/70    Pulse: (!) 122 108 102 103   Resp:       Temp:       TempSrc:       SpO2: 97% 95% 95% 97%   Weight:       Height:           Active LDAs/IV Access:   Lines, Drains & Airways       Active LDAs       Name Placement date Placement time Site Days    Peripheral IV 04/03/23 1900 Left Antecubital 04/03/23 1900  Antecubital  less than 1                    Labs (abnormal labs have a star):   Labs Reviewed   COMPREHENSIVE METABOLIC PANEL - Abnormal; Notable for the following components:       Result Value    Glucose 140 (*)     Creatinine 1.26 (*)     Sodium 135 (*)     Potassium 3.3 (*)     Chloride 93 (*)     CO2 30.2 (*)     Calcium 9.7 (*)     eGFR 40.1 (*)     All other components within normal limits    Narrative:     GFR Normal >60  Chronic Kidney Disease <60  Kidney Failure <15    The GFR formula is only valid for adults with stable renal function between ages 18 and 70.   SINGLE HSTROPONIN T - Abnormal; Notable for the following  components:    HS Troponin T 30 (*)     All other components within normal limits    Narrative:     High Sensitive Troponin T Reference Range:  <10.0 ng/L- Negative Female for AMI  <15.0 ng/L- Negative Male for AMI  >=10 - Abnormal Female indicating possible myocardial injury.  >=15 - Abnormal Male indicating possible myocardial injury.   Clinicians would have to utilize clinical acumen, EKG, Troponin, and serial changes to determine if it is an Acute Myocardial Infarction or myocardial injury due to an underlying chronic condition.        BNP (IN-HOUSE) - Abnormal; Notable for the following components:    proBNP 3,211.0 (*)     All other components within normal limits    Narrative:     Among patients with dyspnea, NT-proBNP is highly sensitive for the detection of acute congestive heart failure. In addition NT-proBNP of <300 pg/ml effectively rules out acute congestive heart failure with 99% negative predictive value.    Results may be falsely decreased if patient taking Biotin.     CBC WITH AUTO DIFFERENTIAL - Abnormal; Notable for the following components:    RBC 5.53 (*)     Hemoglobin 16.8 (*)     Hematocrit 51.8 (*)     Lymphocyte % 14.3 (*)     Neutrophils, Absolute 7.04 (*)     Monocytes, Absolute 0.94 (*)     nRBC 1.5 (*)     All other components within normal limits   RESPIRATORY PANEL PCR W/ COVID-19 (SARS-COV-2) ANAI/JORGE/THOM/PAD/COR/MAD/NIKHIL IN-HOUSE, NP SWAB IN [facility], 3-4 HR TAT - Normal    Narrative:     In the setting of a positive respiratory panel with a viral infection PLUS a negative procalcitonin without other underlying concern for bacterial infection, consider observing off antibiotics or discontinuation of antibiotics and continue supportive care. If the respiratory panel is positive for atypical bacterial infection (Bordetella pertussis, Chlamydophila pneumoniae, or Mycoplasma pneumoniae), consider antibiotic de-escalation to target atypical bacterial infection.   LACTIC ACID, PLASMA -  "Normal   PROCALCITONIN - Normal    Narrative:     As a Marker for Sepsis (Non-Neonates):    1. <0.5 ng/mL represents a low risk of severe sepsis and/or septic shock.  2. >2 ng/mL represents a high risk of severe sepsis and/or septic shock.    As a Marker for Lower Respiratory Tract Infections that require antibiotic therapy:    PCT on Admission    Antibiotic Therapy       6-12 Hrs later    >0.5                Strongly Recommended  >0.25 - <0.5        Recommended   0.1 - 0.25          Discouraged              Remeasure/reassess PCT  <0.1                Strongly Discouraged     Remeasure/reassess PCT    As 28 day mortality risk marker: \"Change in Procalcitonin Result\" (>80% or <=80%) if Day 0 (or Day 1) and Day 4 values are available. Refer to http://www.Clustrixpct-calculator.com    Change in PCT <=80%  A decrease of PCT levels below or equal to 80% defines a positive change in PCT test result representing a higher risk for 28-day all-cause mortality of patients diagnosed with severe sepsis for septic shock.    Change in PCT >80%  A decrease of PCT levels of more than 80% defines a negative change in PCT result representing a lower risk for 28-day all-cause mortality of patients diagnosed with severe sepsis or septic shock.      BLOOD CULTURE   BLOOD CULTURE   URINALYSIS W/ MICROSCOPIC IF INDICATED (NO CULTURE)   CBC AND DIFFERENTIAL    Narrative:     The following orders were created for panel order CBC & Differential.  Procedure                               Abnormality         Status                     ---------                               -----------         ------                     CBC Auto Differential[[id number]]        Abnormal            Final result                 Please view results for these tests on the individual orders.       EKG:   ECG 12 Lead Dyspnea   Preliminary Result   HEART RATE= 130  bpm   RR Interval= 462  ms   SC Interval=   ms   P Horizontal Axis=   deg   P Front Axis=   deg   QRSD " Interval= 80  ms   QT Interval= 273  ms   QRS Axis= -3  deg   T Wave Axis= 175  deg   - ABNORMAL ECG -   Atrial fibrillation   Repolarization abnormality, prob rate related   Electronically Signed By:    Date and Time of Study: 2023-04-03 18:48:12          Meds given in ED:   Medications   sodium chloride 0.9 % flush 10 mL (has no administration in time range)   cefTRIAXone (ROCEPHIN) 1 g in sodium chloride 0.9 % 100 mL IVPB-VTB (1 g Intravenous New Bag 4/3/23 2047)   azithromycin (ZITHROMAX) 500 mg in sodium chloride 0.9 % 250 mL IVPB-VTB (500 mg Intravenous New Bag 4/3/23 2050)   acetaminophen (TYLENOL) tablet 1,000 mg (1,000 mg Oral Given 4/3/23 1905)       Imaging results:  XR Chest 1 View    Result Date: 4/3/2023  Patchy densities at the mid lungs could be areas of developing infiltrate, follow-up suggested. Cardiomegaly.  This report was finalized on 4/3/2023 7:34 PM by Dr. Steven Garza M.D.       Ambulatory status:   - Liam    Social issues:   Social History     Socioeconomic History    Marital status:    Tobacco Use    Smoking status: Never    Smokeless tobacco: Never    Tobacco comments:     caffiene daily   Vaping Use    Vaping Use: Never used   Substance and Sexual Activity    Alcohol use: Yes     Alcohol/week: 2.0 standard drinks     Types: 1 Glasses of wine, 1 Shots of liquor per week     Comment: occasional/  Daily caffeine use    Drug use: No    Sexual activity: Yes     Partners: Male       NIH Stroke Scale:         Kelby Dailey RN  04/03/23 21:10 JANESSAT     Liam

## 2023-04-04 NOTE — PLAN OF CARE
Goal Outcome Evaluation:  Plan of Care Reviewed With: patient      Progress: no change  Outcome Evaluation: New admit tonight for pneumonia. Up w/ assist x 1 to toilet. Urine sent to lab. Cardiac diet. VSS. Oriented x 4. 3L NC and only wears 3L NC at night at home. Controlled a-fib on the monitor. No complaints.

## 2023-04-04 NOTE — CASE MANAGEMENT/SOCIAL WORK
Discharge Planning Assessment  [facility]     Patient Name: Agnieszka Rizzo  MRN: [id number]  Today's Date: 4/4/2023    Admit Date: 4/3/2023    Plan: Home with family   Discharge Needs Assessment    No documentation.                Discharge Plan     Row Name 04/04/23 1210       Plan    Plan Home with family    Patient/Family in Agreement with Plan yes    Plan Comments Spoke with patient and son/ Handy at bedside. Introduced self and explained CCP role. Verified face sheet and local pharmacy [facility]; patient uses Script mail in service for maintenance medications. Patient denies difficulty with medication cost/ management. Patient lives alone in patio home with 2 steps to enter. Patient has used [facility] in the past. Patient has been to [facility] in the past, does not want to use again. Patient has canes, walkers, rollator, shower chair, and nocturnal oxygen through [employer]. Patient plans to return home with family to transport.              Continued Care and Services - Admitted Since 4/3/2023    Coordination has not been started for this encounter.          Demographic Summary     Row Name 04/04/23 1211       General Information    Admission Type inpatient    Arrived From emergency department    Required Notices Provided Important Message from Medicare    Referral Source admission list    Reason for Consult discharge planning    Preferred Language English       Contact Information    Permission Granted to Share Info With                Functional Status     Row Name 04/04/23 1212       Functional Status    Usual Activity Tolerance good    Current Activity Tolerance moderate       Functional Status, IADL    Medications independent    Meal Preparation independent    Housekeeping independent    Laundry independent    Shopping assistive person       Mental Status    General Appearance WDL WDL       Mental Status Summary    Recent Changes in Mental Status/Cognitive Functioning no changes        Employment/    Employment Status retired               Psychosocial    No documentation.                Abuse/Neglect    No documentation.                Legal    No documentation.                Substance Abuse    No documentation.                Patient Forms    No documentation.                   Liyah Duenas RN

## 2023-04-05 LAB
ANION GAP SERPL CALCULATED.3IONS-SCNC: 11.6 MMOL/L (ref 5–15)
BASOPHILS # BLD AUTO: 0.07 10*3/MM3 (ref 0–0.2)
BASOPHILS NFR BLD AUTO: 0.7 % (ref 0–1.5)
BUN SERPL-MCNC: 18 MG/DL (ref 8–23)
BUN/CREAT SERPL: 19.4 (ref 7–25)
CALCIUM SPEC-SCNC: 9.3 MG/DL (ref 8.2–9.6)
CHLORIDE SERPL-SCNC: 99 MMOL/L (ref 98–107)
CO2 SERPL-SCNC: 27.4 MMOL/L (ref 22–29)
CREAT SERPL-MCNC: 0.93 MG/DL (ref 0.57–1)
DEPRECATED RDW RBC AUTO: 48.2 FL (ref 37–54)
EGFRCR SERPLBLD CKD-EPI 2021: 57.8 ML/MIN/1.73
EOSINOPHIL # BLD AUTO: 0.06 10*3/MM3 (ref 0–0.4)
EOSINOPHIL NFR BLD AUTO: 0.6 % (ref 0.3–6.2)
ERYTHROCYTE [DISTWIDTH] IN BLOOD BY AUTOMATED COUNT: 13.7 % (ref 12.3–15.4)
GLUCOSE SERPL-MCNC: 120 MG/DL (ref 65–99)
HCT VFR BLD AUTO: 35.9 % (ref 34–46.6)
HGB BLD-MCNC: 11.8 G/DL (ref 12–15.9)
IMM GRANULOCYTES # BLD AUTO: 0.03 10*3/MM3 (ref 0–0.05)
IMM GRANULOCYTES NFR BLD AUTO: 0.3 % (ref 0–0.5)
LYMPHOCYTES # BLD AUTO: 1.58 10*3/MM3 (ref 0.7–3.1)
LYMPHOCYTES NFR BLD AUTO: 16.4 % (ref 19.6–45.3)
MCH RBC QN AUTO: 31.3 PG (ref 26.6–33)
MCHC RBC AUTO-ENTMCNC: 32.9 G/DL (ref 31.5–35.7)
MCV RBC AUTO: 95.2 FL (ref 79–97)
MONOCYTES # BLD AUTO: 1.4 10*3/MM3 (ref 0.1–0.9)
MONOCYTES NFR BLD AUTO: 14.5 % (ref 5–12)
NEUTROPHILS NFR BLD AUTO: 6.52 10*3/MM3 (ref 1.7–7)
NEUTROPHILS NFR BLD AUTO: 67.5 % (ref 42.7–76)
NRBC BLD AUTO-RTO: 1.4 /100 WBC (ref 0–0.2)
PLATELET # BLD AUTO: 195 10*3/MM3 (ref 140–450)
PMV BLD AUTO: 10 FL (ref 6–12)
POTASSIUM SERPL-SCNC: 4 MMOL/L (ref 3.5–5.2)
RBC # BLD AUTO: 3.77 10*6/MM3 (ref 3.77–5.28)
SODIUM SERPL-SCNC: 138 MMOL/L (ref 136–145)
WBC NRBC COR # BLD: 9.66 10*3/MM3 (ref 3.4–10.8)

## 2023-04-05 PROCEDURE — 85025 COMPLETE CBC W/AUTO DIFF WBC: CPT | Performed by: INTERNAL MEDICINE

## 2023-04-05 PROCEDURE — 94799 UNLISTED PULMONARY SVC/PX: CPT

## 2023-04-05 PROCEDURE — 25010000002 DIGOXIN PER 500 MCG: Performed by: INTERNAL MEDICINE

## 2023-04-05 PROCEDURE — 99232 SBSQ HOSP IP/OBS MODERATE 35: CPT | Performed by: INTERNAL MEDICINE

## 2023-04-05 PROCEDURE — 94664 DEMO&/EVAL PT USE INHALER: CPT

## 2023-04-05 PROCEDURE — 94761 N-INVAS EAR/PLS OXIMETRY MLT: CPT

## 2023-04-05 PROCEDURE — 25010000002 CEFEPIME PER 500 MG: Performed by: INTERNAL MEDICINE

## 2023-04-05 PROCEDURE — 87205 SMEAR GRAM STAIN: CPT | Performed by: NURSE PRACTITIONER

## 2023-04-05 PROCEDURE — 94668 MNPJ CHEST WALL SBSQ: CPT

## 2023-04-05 PROCEDURE — 80048 BASIC METABOLIC PNL TOTAL CA: CPT | Performed by: INTERNAL MEDICINE

## 2023-04-05 PROCEDURE — 87070 CULTURE OTHR SPECIMN AEROBIC: CPT | Performed by: NURSE PRACTITIONER

## 2023-04-05 RX ORDER — DIGOXIN 0.25 MG/ML
250 INJECTION INTRAMUSCULAR; INTRAVENOUS ONCE
Status: COMPLETED | OUTPATIENT
Start: 2023-04-05 | End: 2023-04-05

## 2023-04-05 RX ADMIN — TORSEMIDE 20 MG: 20 TABLET ORAL at 21:09

## 2023-04-05 RX ADMIN — ISODIUM CHLORIDE 3 ML: 0.03 SOLUTION RESPIRATORY (INHALATION) at 12:28

## 2023-04-05 RX ADMIN — DIGOXIN 250 MCG: 0.25 INJECTION INTRAMUSCULAR; INTRAVENOUS at 13:19

## 2023-04-05 RX ADMIN — CEFEPIME 2 G: 2 INJECTION, POWDER, FOR SOLUTION INTRAVENOUS at 21:08

## 2023-04-05 RX ADMIN — BENZONATATE 200 MG: 100 CAPSULE ORAL at 16:34

## 2023-04-05 RX ADMIN — IPRATROPIUM BROMIDE AND ALBUTEROL SULFATE 3 ML: 2.5; .5 SOLUTION RESPIRATORY (INHALATION) at 19:44

## 2023-04-05 RX ADMIN — DEXTROMETHORPHAN POLISTIREX 60 MG: 30 SUSPENSION ORAL at 21:09

## 2023-04-05 RX ADMIN — APIXABAN 2.5 MG: 2.5 TABLET, FILM COATED ORAL at 08:57

## 2023-04-05 RX ADMIN — ISODIUM CHLORIDE 3 ML: 0.03 SOLUTION RESPIRATORY (INHALATION) at 19:50

## 2023-04-05 RX ADMIN — Medication 1 CAPSULE: at 10:18

## 2023-04-05 RX ADMIN — METOPROLOL TARTRATE 50 MG: 50 TABLET, FILM COATED ORAL at 21:09

## 2023-04-05 RX ADMIN — GUAIFENESIN 600 MG: 600 TABLET, EXTENDED RELEASE ORAL at 21:09

## 2023-04-05 RX ADMIN — IPRATROPIUM BROMIDE AND ALBUTEROL SULFATE 3 ML: 2.5; .5 SOLUTION RESPIRATORY (INHALATION) at 12:27

## 2023-04-05 RX ADMIN — DEXTROMETHORPHAN POLISTIREX 60 MG: 30 SUSPENSION ORAL at 10:17

## 2023-04-05 RX ADMIN — BUDESONIDE AND FORMOTEROL FUMARATE DIHYDRATE 2 PUFF: 160; 4.5 AEROSOL RESPIRATORY (INHALATION) at 09:04

## 2023-04-05 RX ADMIN — IPRATROPIUM BROMIDE AND ALBUTEROL SULFATE 3 ML: 2.5; .5 SOLUTION RESPIRATORY (INHALATION) at 15:44

## 2023-04-05 RX ADMIN — APIXABAN 2.5 MG: 2.5 TABLET, FILM COATED ORAL at 21:09

## 2023-04-05 RX ADMIN — BUDESONIDE AND FORMOTEROL FUMARATE DIHYDRATE 2 PUFF: 160; 4.5 AEROSOL RESPIRATORY (INHALATION) at 19:58

## 2023-04-05 RX ADMIN — METOPROLOL TARTRATE 50 MG: 50 TABLET, FILM COATED ORAL at 08:57

## 2023-04-05 RX ADMIN — CETIRIZINE HYDROCHLORIDE 10 MG: 10 TABLET ORAL at 10:18

## 2023-04-05 RX ADMIN — Medication 1000 UNITS: at 10:17

## 2023-04-05 RX ADMIN — POTASSIUM CHLORIDE 10 MEQ: 750 TABLET, EXTENDED RELEASE ORAL at 10:17

## 2023-04-05 RX ADMIN — GUAIFENESIN 600 MG: 600 TABLET, EXTENDED RELEASE ORAL at 10:18

## 2023-04-05 RX ADMIN — CEFEPIME 2 G: 2 INJECTION, POWDER, FOR SOLUTION INTRAVENOUS at 10:16

## 2023-04-05 RX ADMIN — TORSEMIDE 20 MG: 20 TABLET ORAL at 10:18

## 2023-04-05 RX ADMIN — FERROUS SULFATE TAB 325 MG (65 MG ELEMENTAL FE) 325 MG: 325 (65 FE) TAB at 10:18

## 2023-04-05 NOTE — PROGRESS NOTES
Name: Agnieszka Rizzo ADMIT: 4/3/2023   : 1930  PCP: Matt Rose MD    MRN: [id number] LOS: 2 days   AGE/SEX: 89 y.o. female  ROOM: [facility]     Subjective   Subjective   Patient is lying on the bed and states she is feeling better from respiratory standpoint and her heart rate is also better controlled.  Currently on 2 L of oxygen which will continued.  Denies nausea, vomiting, chest pain, palpitations, dizziness.       Objective   Objective   Vital Signs  Temp:  [97.1 °F (36.2 °C)-99.3 °F (37.4 °C)] 98.2 °F (36.8 °C)  Heart Rate:  [104-148] 110  Resp:  [16-22] 20  BP: (104-126)/(66-87) 104/68  SpO2:  [90 %-95 %] 93 %  on  Flow (L/min):  [2] 2;   Device (Oxygen Therapy): nasal cannula  Body mass index is 21.27 kg/m².  Physical Exam   HEENT: PERRLA, extraocular movements intact, Scleras no icterus  Neck: Supple, no JVD  Cardiovascular: Irregular with normal S1 and S2  Respiratory: Diminished breath sounds with faint wheezes and rhonchi  GI: Soft, nontender, bowel sounds are present  Extremities: No edema, palpable pedal pulses    Results Review     I reviewed the patient's new clinical results.  Results from last 7 days   Lab Units 23  0619 23  0215 23  1851   WBC 10*3/mm3 9.66 7.08 9.54   HEMOGLOBIN g/dL 11.8* 11.3* 16.8*   PLATELETS 10*3/mm3 195 199 191     Results from last 7 days   Lab Units 23  0619 23  0215 23  1851   SODIUM mmol/L 138 135* 135*   POTASSIUM mmol/L 4.0 2.9* 3.3*   CHLORIDE mmol/L 99 100 93*   CO2 mmol/L 27.4 23.0 30.2*   BUN mg/dL 18 20 21   CREATININE mg/dL 0.93 0.94 1.26*   GLUCOSE mg/dL 120* 111* 140*   EGFR mL/min/1.73 57.8* 57.0* 40.1*     Results from last 7 days   Lab Units 23  1851   ALBUMIN g/dL 4.2   BILIRUBIN mg/dL 1.1   ALK PHOS U/L 107   AST (SGOT) U/L 22   ALT (SGPT) U/L 16     Results from last 7 days   Lab Units 23  0619 23  0900 23  0215 23  1851   CALCIUM mg/dL 9.3  --  8.7 9.7*   ALBUMIN g/dL  --    --   --  4.2   MAGNESIUM mg/dL  --  2.5*  --   --      Results from last 7 days   Lab Units 04/03/23 1852 04/03/23 1851   PROCALCITONIN ng/mL  --  0.19   LACTATE mmol/L 1.1  --      No results found for: HGBA1C, POCGLU    XR Chest 1 View    Result Date: 4/3/2023  Patchy densities at the mid lungs could be areas of developing infiltrate, follow-up suggested. Cardiomegaly.  This report was finalized on 4/3/2023 7:34 PM by Dr. Steven Garza M.D.      I have personally reviewed all medications:  Scheduled Medications  apixaban, 2.5 mg, Oral, Q12H  atorvastatin, 40 mg, Oral, Daily  budesonide-formoterol, 2 puff, Inhalation, BID - RT  cefepime, 2 g, Intravenous, Q12H  cetirizine, 10 mg, Oral, Daily  cholecalciferol, 1,000 Units, Oral, Daily  citalopram, 10 mg, Oral, Daily  dextromethorphan polistirex ER, 60 mg, Oral, Q12H  digoxin, 250 mcg, Intravenous, Once  ferrous sulfate, 325 mg, Oral, Daily With Breakfast  guaiFENesin, 600 mg, Oral, BID  ipratropium-albuterol, 3 mL, Nebulization, 4x Daily - RT  lactobacillus acidophilus, 1 capsule, Oral, Daily  metoprolol tartrate, 50 mg, Oral, Q12H  potassium chloride, 10 mEq, Oral, Daily  senna-docusate sodium, 2 tablet, Oral, BID  sodium chloride, 3 mL, Nebulization, BID  torsemide, 20 mg, Oral, BID    Infusions   Diet  Diet: Cardiac Diets; Healthy Heart (2-3 Na+); Texture: Regular Texture (IDDSI 7); Fluid Consistency: Thin (IDDSI 0)    I have personally reviewed:  [x]  Laboratory   [x]  Microbiology   [x]  Radiology   [x]  EKG/Telemetry  [x]  Cardiology/Vascular   [x]  Pathology    [x]  Records       Assessment/Plan     Active Hospital Problems    Diagnosis  POA   • **Sepsis due to pneumonia [J18.9, A41.9]  Yes   • COPD (chronic obstructive pulmonary disease) [J44.9]  Yes   • Bronchiectasis [J47.9]  Yes   • Hypokalemia [E87.6]  Yes   • Chronic diastolic CHF (congestive heart failure) (HCC) [I50.32]  Yes   • Acute on chronic respiratory failure with hypoxia [J96.21]  Yes    • Primary hypertension [I10]  Yes      Resolved Hospital Problems   No resolved problems to display.       89 y.o. female admitted with Sepsis due to pneumonia.    1. Sepsis secondary to pneumonia/bronchiectasis/COPD flare, continue with DuoNebs, Symbicort, mucolytic's, cough suppressants and pulmonary is following along as well.  On broad-spectrum antibiotics which include cefepime.  Currently on 2 L of oxygen and have encouraged her to use incentive spirometry.  2.  Chronic atrial fibrillation, rate is better controlled and is on Eliquis, digoxin, metoprolol.  3.  Chronic diastolic heart failure, currently compensated and is on torsemide which is being continued.  4.  Hyperlipidemia, on statins.  5.  Hypokalemia, on replacement protocol.  6.  On Eliquis for DVT prophylaxis.  7.  CODE STATUS is full code.  8.  Estimated discharge date, next 1 to 2 days.      Bob Pretty MD  [facility]  04/05/23  11:27 EDT

## 2023-04-05 NOTE — PLAN OF CARE
Goal Outcome Evaluation:  Plan of Care Reviewed With: patient        Progress: improving   Pt a/o x4, HR improving today 90's to low 100's, afib on monitor, 3L NC. IV cefepime. Productive cough, sputum culture sent. Dig 250mcg x1 dose given. WCTM.

## 2023-04-05 NOTE — PROGRESS NOTES
"  Daily Progress Note.   [address]  4/5/2023    Patient:  Name:  Agnieszka Rizzo  MRN:  [id number]  8/7/1930  89 y.o.  female         CC:ae of bronchiectasis    Interval History:  Rattling cough still  Working with nebs and chest physio-manula percussion  Awake alert conversant no lethargy, seems a little frustrated today.    Physical Exam:  /68 (BP Location: Right arm, Patient Position: Sitting)   Pulse 110   Temp 98.2 °F (36.8 °C) (Oral)   Resp 20   Ht 160 cm (63\")   Wt 54.5 kg (120 lb 1.6 oz)   SpO2 93%   BMI 21.27 kg/m²   Body mass index is 21.27 kg/m².    Intake/Output Summary (Last 24 hours) at 4/5/2023 1127  Last data filed at 4/5/2023 0908  Gross per 24 hour   Intake 100 ml   Output 300 ml   Net -200 ml     General appearance: Nontoxic awake pleasant, conversant   Eyes: Anicteric sclerae, moist conjunctivae;   HENT: Atraumatic; oropharynx clear with moist mucous membranes   Neck: Trachea midline; supple  Lungs: Rhonchi diffusely, with normal respiratory effort and no intercostal retractions  CV: RRR, no rub  Abdomen: Bowel sounds positive nonrigid  Extremities: No peripheral edema or extremity lymphadenopathy  Skin: Normal temperature, turgor and texture; no rash  Psych: Appropriate affect, alert and oriented   Neuro cranials 2 through 12 grossly intact speech intact moves all extremities    Data Review:  Notable Labs:  Results from last 7 days   Lab Units 04/05/23  0619 04/04/23 0215 04/03/23  1851   WBC 10*3/mm3 9.66 7.08 9.54   HEMOGLOBIN g/dL 11.8* 11.3* 16.8*   PLATELETS 10*3/mm3 195 199 191     Results from last 7 days   Lab Units 04/05/23  0619 04/04/23  0900 04/04/23  0215 04/03/23  1851   SODIUM mmol/L 138  --  135* 135*   POTASSIUM mmol/L 4.0  --  2.9* 3.3*   CHLORIDE mmol/L 99  --  100 93*   CO2 mmol/L 27.4  --  23.0 30.2*   BUN mg/dL 18  --  20 21   CREATININE mg/dL 0.93  --  0.94 1.26*   GLUCOSE mg/dL 120*  --  111* 140*   CALCIUM mg/dL 9.3  --  8.7 9.7* "   MAGNESIUM mg/dL  --  2.5*  --   --    Estimated Creatinine Clearance: 33.2 mL/min (by C-G formula based on SCr of 0.93 mg/dL).    Results from last 7 days   Lab Units 04/05/23  0619 04/04/23  0215 04/03/23  1852 04/03/23  1851   AST (SGOT) U/L  --   --   --  22   ALT (SGPT) U/L  --   --   --  16   PROCALCITONIN ng/mL  --   --   --  0.19   LACTATE mmol/L  --   --  1.1  --    PLATELETS 10*3/mm3 195 199  --  191             Imaging:  Reviewed chest images personally from past 3 days    ASSESSMENT  /  PLAN:  Bronchiectasis with acute exacerbation  Pneumonia  Hypokalemia  AHRF   atrial fibrillation  Mitral valve regurgitation  Hypertension  Coronary disease  Chronic diastolic heart failure   .  Flutter valve  Chest physiotherapy  Hypertonic saline  Scheduled DuoNebs  D/c cough suppressant, we want her to clear sputum    Repeat cxr in am    Cover for Pseudomonas with cefepime   Follow sputum cultures if we can get one    Discussed with the patient and her son Donald at bedside.  Pt of Dr Tucker.    Guillermo Pimentel MD  [facility]  04/05/23  12:30 EDT

## 2023-04-05 NOTE — PROGRESS NOTES
"    Patient Name: Agnieszka Rizzo  :1930  89 y.o.      Patient Care Team:  Matt Rose MD as PCP - General (Family Medicine)  Marcie Robbins MD as Consulting Physician (Cardiology)  Nilesh Danielle MD as Consulting Physician (Urology)  Lina Morris RPH as Pharmacist  Lev Mckeon PharmD as Pharmacist (Pharmacy)  Rogelio Tucker MD as Consulting Physician (Pulmonary Disease)    Chief Complaint: PNA    Interval History: still coughing, no CP       Objective   Vital Signs  Temp:  [97.1 °F (36.2 °C)-99.3 °F (37.4 °C)] 98.2 °F (36.8 °C)  Heart Rate:  [] 110  Resp:  [16-22] 20  BP: (104-126)/(66-87) 104/68    Intake/Output Summary (Last 24 hours) at 2023 1009  Last data filed at 2023 0908  Gross per 24 hour   Intake 460 ml   Output 300 ml   Net 160 ml     Flowsheet Rows    Flowsheet Row First Filed Value   Admission Height 160 cm (63\") Documented at 2023 1844   Admission Weight 57.2 kg (126 lb 3.2 oz) Documented at 2023 1844          Physical Exam:   General Appearance:    Alert, cooperative, in no acute distress   Lungs:     Rhonchi/sheezing.  Normal respiratory effort and rate.      Heart:    irregular rhythm and increased rate, normal S1 and S2, no murmurs, gallops or rubs.     Chest Wall:    No abnormalities observed   Abdomen:     Soft, nontender, positive bowel sounds.     Extremities:   no cyanosis, clubbing or edema.  No marked joint deformities.  Adequate musculoskeletal strength.       Results Review:    Results from last 7 days   Lab Units 23  0619   SODIUM mmol/L 138   POTASSIUM mmol/L 4.0   CHLORIDE mmol/L 99   CO2 mmol/L 27.4   BUN mg/dL 18   CREATININE mg/dL 0.93   GLUCOSE mg/dL 120*   CALCIUM mg/dL 9.3     Results from last 7 days   Lab Units 23  0215 23  0015 23  1851   HSTROP T ng/L 25* 26* 30*     Results from last 7 days   Lab Units 23  0619   WBC 10*3/mm3 9.66   HEMOGLOBIN g/dL 11.8*   HEMATOCRIT % 35.9   PLATELETS 10*3/mm3 195 "         Results from last 7 days   Lab Units 04/04/23  0900   MAGNESIUM mg/dL 2.5*                   Medication Review:   apixaban, 2.5 mg, Oral, Q12H  atorvastatin, 40 mg, Oral, Daily  budesonide-formoterol, 2 puff, Inhalation, BID - RT  cefepime, 2 g, Intravenous, Q12H  cetirizine, 10 mg, Oral, Daily  cholecalciferol, 1,000 Units, Oral, Daily  citalopram, 10 mg, Oral, Daily  dextromethorphan polistirex ER, 60 mg, Oral, Q12H  ferrous sulfate, 325 mg, Oral, Daily With Breakfast  guaiFENesin, 600 mg, Oral, BID  ipratropium-albuterol, 3 mL, Nebulization, 4x Daily - RT  lactobacillus acidophilus, 1 capsule, Oral, Daily  metoprolol tartrate, 50 mg, Oral, Q12H  potassium chloride, 10 mEq, Oral, Daily  senna-docusate sodium, 2 tablet, Oral, BID  sodium chloride, 3 mL, Nebulization, BID  torsemide, 20 mg, Oral, BID              Assessment & Plan   Active Hospital Problems    Diagnosis  POA   • **Sepsis due to pneumonia [J18.9, A41.9]  Yes   • COPD (chronic obstructive pulmonary disease) [J44.9]  Yes   • Bronchiectasis [J47.9]  Yes   • Hypokalemia [E87.6]  Yes   • Chronic diastolic CHF (congestive heart failure) ([facility]) [I50.32]  Yes   • Acute on chronic respiratory failure with hypoxia [J96.21]  Yes   • Primary hypertension [I10]  Yes      Resolved Hospital Problems   No resolved problems to display.       1. Acute hypoxic respiratory failure - being treated for pna. Fever, productive cough.   2. Persistent atrial fibrillation - rate rapid. Exacerbated by #1. Will give additional IV digoxin and follow. Continue current dose of beta blocker.    3. Chronic diastolic heart failure - on oral torsemide. Euvolemic  4. Mild non obstructive coronary artery disease by cath 2007. Normal stress 2018. No anginal symptoms.   5. Elevated high sensitivity troponin , flat trend. No evidence of acute coronary syndrome/MI  6. Moderate mitral valve regurgitation  7. Hypertension     Adria Murry, III, MD  [facility]  04/05/23  10:09 EDT

## 2023-04-06 ENCOUNTER — APPOINTMENT (OUTPATIENT)
Dept: CARDIOLOGY | Facility: HOSPITAL | Age: 88
DRG: 871 | End: 2023-04-06
Payer: MEDICARE

## 2023-04-06 ENCOUNTER — APPOINTMENT (OUTPATIENT)
Dept: GENERAL RADIOLOGY | Facility: HOSPITAL | Age: 88
DRG: 871 | End: 2023-04-06
Payer: MEDICARE

## 2023-04-06 LAB
ANION GAP SERPL CALCULATED.3IONS-SCNC: 12 MMOL/L (ref 5–15)
BASOPHILS # BLD AUTO: 0.06 10*3/MM3 (ref 0–0.2)
BASOPHILS NFR BLD AUTO: 0.6 % (ref 0–1.5)
BH CV LOW VAS RIGHT SOLEAL VESSEL: 1
BH CV LOWER VASCULAR LEFT COMMON FEMORAL AUGMENT: NORMAL
BH CV LOWER VASCULAR LEFT COMMON FEMORAL COMPETENT: NORMAL
BH CV LOWER VASCULAR LEFT COMMON FEMORAL COMPRESS: NORMAL
BH CV LOWER VASCULAR LEFT COMMON FEMORAL PHASIC: NORMAL
BH CV LOWER VASCULAR LEFT COMMON FEMORAL SPONT: NORMAL
BH CV LOWER VASCULAR LEFT DISTAL FEMORAL COMPRESS: NORMAL
BH CV LOWER VASCULAR LEFT GASTRONEMIUS COMPRESS: NORMAL
BH CV LOWER VASCULAR LEFT GREATER SAPH AK COMPRESS: NORMAL
BH CV LOWER VASCULAR LEFT GREATER SAPH BK COMPRESS: NORMAL
BH CV LOWER VASCULAR LEFT LESSER SAPH COMPRESS: NORMAL
BH CV LOWER VASCULAR LEFT MID FEMORAL AUGMENT: NORMAL
BH CV LOWER VASCULAR LEFT MID FEMORAL COMPETENT: NORMAL
BH CV LOWER VASCULAR LEFT MID FEMORAL COMPRESS: NORMAL
BH CV LOWER VASCULAR LEFT MID FEMORAL PHASIC: NORMAL
BH CV LOWER VASCULAR LEFT MID FEMORAL SPONT: NORMAL
BH CV LOWER VASCULAR LEFT PERONEAL COMPRESS: NORMAL
BH CV LOWER VASCULAR LEFT POPLITEAL AUGMENT: NORMAL
BH CV LOWER VASCULAR LEFT POPLITEAL COMPETENT: NORMAL
BH CV LOWER VASCULAR LEFT POPLITEAL COMPRESS: NORMAL
BH CV LOWER VASCULAR LEFT POPLITEAL PHASIC: NORMAL
BH CV LOWER VASCULAR LEFT POPLITEAL SPONT: NORMAL
BH CV LOWER VASCULAR LEFT POSTERIOR TIBIAL COMPRESS: NORMAL
BH CV LOWER VASCULAR LEFT PROFUNDA FEMORAL COMPRESS: NORMAL
BH CV LOWER VASCULAR LEFT PROXIMAL FEMORAL COMPRESS: NORMAL
BH CV LOWER VASCULAR LEFT SAPHENOFEMORAL JUNCTION COMPRESS: NORMAL
BH CV LOWER VASCULAR RIGHT COMMON FEMORAL AUGMENT: NORMAL
BH CV LOWER VASCULAR RIGHT COMMON FEMORAL COMPETENT: NORMAL
BH CV LOWER VASCULAR RIGHT COMMON FEMORAL COMPRESS: NORMAL
BH CV LOWER VASCULAR RIGHT COMMON FEMORAL PHASIC: NORMAL
BH CV LOWER VASCULAR RIGHT COMMON FEMORAL SPONT: NORMAL
BH CV LOWER VASCULAR RIGHT DISTAL FEMORAL COMPRESS: NORMAL
BH CV LOWER VASCULAR RIGHT GASTRONEMIUS COMPRESS: NORMAL
BH CV LOWER VASCULAR RIGHT GREATER SAPH AK COMPRESS: NORMAL
BH CV LOWER VASCULAR RIGHT GREATER SAPH BK COMPRESS: NORMAL
BH CV LOWER VASCULAR RIGHT LESSER SAPH COMPRESS: NORMAL
BH CV LOWER VASCULAR RIGHT MID FEMORAL AUGMENT: NORMAL
BH CV LOWER VASCULAR RIGHT MID FEMORAL COMPETENT: NORMAL
BH CV LOWER VASCULAR RIGHT MID FEMORAL COMPRESS: NORMAL
BH CV LOWER VASCULAR RIGHT MID FEMORAL PHASIC: NORMAL
BH CV LOWER VASCULAR RIGHT MID FEMORAL SPONT: NORMAL
BH CV LOWER VASCULAR RIGHT PERONEAL COMPRESS: NORMAL
BH CV LOWER VASCULAR RIGHT POPLITEAL AUGMENT: NORMAL
BH CV LOWER VASCULAR RIGHT POPLITEAL COMPETENT: NORMAL
BH CV LOWER VASCULAR RIGHT POPLITEAL COMPRESS: NORMAL
BH CV LOWER VASCULAR RIGHT POPLITEAL PHASIC: NORMAL
BH CV LOWER VASCULAR RIGHT POPLITEAL SPONT: NORMAL
BH CV LOWER VASCULAR RIGHT POSTERIOR TIBIAL COMPRESS: NORMAL
BH CV LOWER VASCULAR RIGHT PROFUNDA FEMORAL COMPRESS: NORMAL
BH CV LOWER VASCULAR RIGHT PROXIMAL FEMORAL COMPRESS: NORMAL
BH CV LOWER VASCULAR RIGHT SAPHENOFEMORAL JUNCTION COMPRESS: NORMAL
BH CV LOWER VASCULAR RIGHT SOLEAL COMPRESS: NORMAL
BH CV LOWER VASCULAR RIGHT SOLEAL THROMBUS: NORMAL
BUN SERPL-MCNC: 16 MG/DL (ref 8–23)
BUN/CREAT SERPL: 18 (ref 7–25)
CALCIUM SPEC-SCNC: 8.8 MG/DL (ref 8.2–9.6)
CHLORIDE SERPL-SCNC: 95 MMOL/L (ref 98–107)
CO2 SERPL-SCNC: 29 MMOL/L (ref 22–29)
CREAT SERPL-MCNC: 0.89 MG/DL (ref 0.57–1)
DEPRECATED RDW RBC AUTO: 49.3 FL (ref 37–54)
EGFRCR SERPLBLD CKD-EPI 2021: 60.9 ML/MIN/1.73
EOSINOPHIL # BLD AUTO: 0.12 10*3/MM3 (ref 0–0.4)
EOSINOPHIL NFR BLD AUTO: 1.1 % (ref 0.3–6.2)
ERYTHROCYTE [DISTWIDTH] IN BLOOD BY AUTOMATED COUNT: 13.7 % (ref 12.3–15.4)
GLUCOSE SERPL-MCNC: 107 MG/DL (ref 65–99)
HCT VFR BLD AUTO: 34.4 % (ref 34–46.6)
HGB BLD-MCNC: 11.4 G/DL (ref 12–15.9)
IMM GRANULOCYTES # BLD AUTO: 0.03 10*3/MM3 (ref 0–0.05)
IMM GRANULOCYTES NFR BLD AUTO: 0.3 % (ref 0–0.5)
LYMPHOCYTES # BLD AUTO: 2.12 10*3/MM3 (ref 0.7–3.1)
LYMPHOCYTES NFR BLD AUTO: 19.5 % (ref 19.6–45.3)
MAXIMAL PREDICTED HEART RATE: 128 BPM
MCH RBC QN AUTO: 31.9 PG (ref 26.6–33)
MCHC RBC AUTO-ENTMCNC: 33.1 G/DL (ref 31.5–35.7)
MCV RBC AUTO: 96.4 FL (ref 79–97)
MONOCYTES # BLD AUTO: 1.31 10*3/MM3 (ref 0.1–0.9)
MONOCYTES NFR BLD AUTO: 12 % (ref 5–12)
NEUTROPHILS NFR BLD AUTO: 66.5 % (ref 42.7–76)
NEUTROPHILS NFR BLD AUTO: 7.24 10*3/MM3 (ref 1.7–7)
NRBC BLD AUTO-RTO: 0.1 /100 WBC (ref 0–0.2)
PLATELET # BLD AUTO: 195 10*3/MM3 (ref 140–450)
PMV BLD AUTO: 10.3 FL (ref 6–12)
POTASSIUM SERPL-SCNC: 3.5 MMOL/L (ref 3.5–5.2)
RBC # BLD AUTO: 3.57 10*6/MM3 (ref 3.77–5.28)
SODIUM SERPL-SCNC: 136 MMOL/L (ref 136–145)
STRESS TARGET HR: 109 BPM
WBC NRBC COR # BLD: 10.88 10*3/MM3 (ref 3.4–10.8)

## 2023-04-06 PROCEDURE — 94761 N-INVAS EAR/PLS OXIMETRY MLT: CPT

## 2023-04-06 PROCEDURE — 25010000002 FUROSEMIDE PER 20 MG: Performed by: NURSE PRACTITIONER

## 2023-04-06 PROCEDURE — 94760 N-INVAS EAR/PLS OXIMETRY 1: CPT

## 2023-04-06 PROCEDURE — 99232 SBSQ HOSP IP/OBS MODERATE 35: CPT | Performed by: NURSE PRACTITIONER

## 2023-04-06 PROCEDURE — 94799 UNLISTED PULMONARY SVC/PX: CPT

## 2023-04-06 PROCEDURE — 80048 BASIC METABOLIC PNL TOTAL CA: CPT | Performed by: INTERNAL MEDICINE

## 2023-04-06 PROCEDURE — 85025 COMPLETE CBC W/AUTO DIFF WBC: CPT | Performed by: INTERNAL MEDICINE

## 2023-04-06 PROCEDURE — 93970 EXTREMITY STUDY: CPT

## 2023-04-06 PROCEDURE — 25010000002 CEFEPIME PER 500 MG: Performed by: INTERNAL MEDICINE

## 2023-04-06 PROCEDURE — 71045 X-RAY EXAM CHEST 1 VIEW: CPT

## 2023-04-06 PROCEDURE — 94664 DEMO&/EVAL PT USE INHALER: CPT

## 2023-04-06 PROCEDURE — 94668 MNPJ CHEST WALL SBSQ: CPT

## 2023-04-06 RX ORDER — FUROSEMIDE 10 MG/ML
20 INJECTION INTRAMUSCULAR; INTRAVENOUS ONCE
Status: COMPLETED | OUTPATIENT
Start: 2023-04-06 | End: 2023-04-06

## 2023-04-06 RX ORDER — ATORVASTATIN CALCIUM 20 MG/1
40 TABLET, FILM COATED ORAL NIGHTLY
Status: DISCONTINUED | OUTPATIENT
Start: 2023-04-06 | End: 2023-04-09 | Stop reason: HOSPADM

## 2023-04-06 RX ADMIN — Medication 1 CAPSULE: at 08:06

## 2023-04-06 RX ADMIN — CEFEPIME 2 G: 2 INJECTION, POWDER, FOR SOLUTION INTRAVENOUS at 21:00

## 2023-04-06 RX ADMIN — ISODIUM CHLORIDE 3 ML: 0.03 SOLUTION RESPIRATORY (INHALATION) at 07:52

## 2023-04-06 RX ADMIN — Medication 1000 UNITS: at 08:06

## 2023-04-06 RX ADMIN — GUAIFENESIN 600 MG: 600 TABLET, EXTENDED RELEASE ORAL at 08:05

## 2023-04-06 RX ADMIN — IPRATROPIUM BROMIDE AND ALBUTEROL SULFATE 3 ML: 2.5; .5 SOLUTION RESPIRATORY (INHALATION) at 20:22

## 2023-04-06 RX ADMIN — APIXABAN 2.5 MG: 2.5 TABLET, FILM COATED ORAL at 08:05

## 2023-04-06 RX ADMIN — FUROSEMIDE 20 MG: 10 INJECTION, SOLUTION INTRAMUSCULAR; INTRAVENOUS at 11:56

## 2023-04-06 RX ADMIN — ATORVASTATIN CALCIUM 40 MG: 20 TABLET, FILM COATED ORAL at 20:56

## 2023-04-06 RX ADMIN — FERROUS SULFATE TAB 325 MG (65 MG ELEMENTAL FE) 325 MG: 325 (65 FE) TAB at 08:05

## 2023-04-06 RX ADMIN — BUDESONIDE AND FORMOTEROL FUMARATE DIHYDRATE 2 PUFF: 160; 4.5 AEROSOL RESPIRATORY (INHALATION) at 07:53

## 2023-04-06 RX ADMIN — CEFEPIME 2 G: 2 INJECTION, POWDER, FOR SOLUTION INTRAVENOUS at 09:18

## 2023-04-06 RX ADMIN — IPRATROPIUM BROMIDE AND ALBUTEROL SULFATE 3 ML: 2.5; .5 SOLUTION RESPIRATORY (INHALATION) at 07:51

## 2023-04-06 RX ADMIN — METOPROLOL TARTRATE 50 MG: 50 TABLET, FILM COATED ORAL at 20:56

## 2023-04-06 RX ADMIN — CETIRIZINE HYDROCHLORIDE 10 MG: 10 TABLET ORAL at 08:05

## 2023-04-06 RX ADMIN — BUDESONIDE AND FORMOTEROL FUMARATE DIHYDRATE 2 PUFF: 160; 4.5 AEROSOL RESPIRATORY (INHALATION) at 20:22

## 2023-04-06 RX ADMIN — TORSEMIDE 20 MG: 20 TABLET ORAL at 21:15

## 2023-04-06 RX ADMIN — APIXABAN 2.5 MG: 2.5 TABLET, FILM COATED ORAL at 20:56

## 2023-04-06 RX ADMIN — GUAIFENESIN 600 MG: 600 TABLET, EXTENDED RELEASE ORAL at 21:15

## 2023-04-06 RX ADMIN — TORSEMIDE 20 MG: 20 TABLET ORAL at 08:05

## 2023-04-06 RX ADMIN — CITALOPRAM 10 MG: 10 TABLET, FILM COATED ORAL at 08:06

## 2023-04-06 RX ADMIN — METOPROLOL TARTRATE 50 MG: 50 TABLET, FILM COATED ORAL at 08:05

## 2023-04-06 RX ADMIN — IPRATROPIUM BROMIDE AND ALBUTEROL SULFATE 3 ML: 2.5; .5 SOLUTION RESPIRATORY (INHALATION) at 11:02

## 2023-04-06 RX ADMIN — DEXTROMETHORPHAN POLISTIREX 60 MG: 30 SUSPENSION ORAL at 08:06

## 2023-04-06 RX ADMIN — POTASSIUM CHLORIDE 10 MEQ: 750 TABLET, EXTENDED RELEASE ORAL at 08:06

## 2023-04-06 NOTE — PROGRESS NOTES
Name: Agnieszka Rizzo ADMIT: 4/3/2023   : 1930  PCP: Matt Rose MD    MRN: [id number] LOS: 3 days   AGE/SEX: 89 y.o. female  ROOM: [facility]     Subjective   Subjective       Patient is sitting up in the bed and states she is feeling better. Currently on 2 L of oxygen which will continued.  Denies nausea, vomiting, chest pain, palpitations, dizziness.       Objective   Objective   Vital Signs  Temp:  [97.7 °F (36.5 °C)-98.7 °F (37.1 °C)] 97.9 °F (36.6 °C)  Heart Rate:  [] 80  Resp:  [18-20] 18  BP: (113-128)/(71-83) 113/73  SpO2:  [94 %-100 %] 98 %  on  Flow (L/min):  [2-2.5] 2;   Device (Oxygen Therapy): nasal cannula  Body mass index is 21.27 kg/m².  Physical Exam   HEENT: PERRLA, extraocular movements intact, Scleras no icterus  Neck: Supple, no JVD  Cardiovascular: Irregular with normal S1 and S2  Respiratory: Diminished breath sounds with faint wheezes and rhonchi  GI: Soft, nontender, bowel sounds are present  Extremities: No edema, palpable pedal pulses    Results Review     I reviewed the patient's new clinical results.  Results from last 7 days   Lab Units 23  0623  1851   WBC 10*3/mm3 10.88* 9.66 7.08 9.54   HEMOGLOBIN g/dL 11.4* 11.8* 11.3* 16.8*   PLATELETS 10*3/mm3 195 195 199 191     Results from last 7 days   Lab Units 23  0616 23  0623  1851   SODIUM mmol/L 136 138 135* 135*   POTASSIUM mmol/L 3.5 4.0 2.9* 3.3*   CHLORIDE mmol/L 95* 99 100 93*   CO2 mmol/L 29.0 27.4 23.0 30.2*   BUN mg/dL 16 18 20 21   CREATININE mg/dL 0.89 0.93 0.94 1.26*   GLUCOSE mg/dL 107* 120* 111* 140*   EGFR mL/min/1.73 60.9 57.8* 57.0* 40.1*     Results from last 7 days   Lab Units 23  1851   ALBUMIN g/dL 4.2   BILIRUBIN mg/dL 1.1   ALK PHOS U/L 107   AST (SGOT) U/L 22   ALT (SGPT) U/L 16     Results from last 7 days   Lab Units 23  0616 23  0619 23  0900 23  0215 23  1851   CALCIUM mg/dL 8.8  9.3  --  8.7 9.7*   ALBUMIN g/dL  --   --   --   --  4.2   MAGNESIUM mg/dL  --   --  2.5*  --   --      Results from last 7 days   Lab Units 04/03/23  1852 04/03/23  1851   PROCALCITONIN ng/mL  --  0.19   LACTATE mmol/L 1.1  --      No results found for: HGBA1C, POCGLU    No radiology results for the last day  I have personally reviewed all medications:  Scheduled Medications  apixaban, 2.5 mg, Oral, Q12H  atorvastatin, 40 mg, Oral, Nightly  budesonide-formoterol, 2 puff, Inhalation, BID - RT  cefepime, 2 g, Intravenous, Q12H  cetirizine, 10 mg, Oral, Daily  cholecalciferol, 1,000 Units, Oral, Daily  citalopram, 10 mg, Oral, Daily  ferrous sulfate, 325 mg, Oral, Daily With Breakfast  guaiFENesin, 600 mg, Oral, BID  ipratropium-albuterol, 3 mL, Nebulization, 4x Daily - RT  lactobacillus acidophilus, 1 capsule, Oral, Daily  metoprolol tartrate, 50 mg, Oral, Q12H  potassium chloride, 10 mEq, Oral, Daily  senna-docusate sodium, 2 tablet, Oral, BID  sodium chloride, 3 mL, Nebulization, BID  torsemide, 20 mg, Oral, BID    Infusions   Diet  Diet: Cardiac Diets; Healthy Heart (2-3 Na+); Texture: Regular Texture (IDDSI 7); Fluid Consistency: Thin (IDDSI 0)    I have personally reviewed:  [x]  Laboratory   [x]  Microbiology   [x]  Radiology   [x]  EKG/Telemetry  [x]  Cardiology/Vascular   [x]  Pathology    [x]  Records       Assessment/Plan     Active Hospital Problems    Diagnosis  POA   • **Sepsis due to pneumonia [J18.9, A41.9]  Yes   • COPD (chronic obstructive pulmonary disease) [J44.9]  Yes   • Bronchiectasis [J47.9]  Yes   • Hypokalemia [E87.6]  Yes   • Chronic diastolic CHF (congestive heart failure) (HCC) [I50.32]  Yes   • Acute on chronic respiratory failure with hypoxia [J96.21]  Yes   • Primary hypertension [I10]  Yes      Resolved Hospital Problems   No resolved problems to display.       89 y.o. female admitted with Sepsis due to pneumonia.    1. Sepsis secondary to pneumonia/bronchiectasis/COPD flare, continue  with DuoNebs, Symbicort, mucolytic's, cough suppressants and pulmonary is following along as well.  On broad-spectrum antibiotics which include cefepime.  Currently on 2 L of oxygen and have encouraged her to use incentive spirometry.  Appreciate pulmonary input.    2.  Chronic atrial fibrillation, rate is better controlled and is on Eliquis, digoxin, metoprolol.  Cardiology following along.    3.  Chronic diastolic heart failure, currently compensated and is on torsemide which is being continued.    4.  Hyperlipidemia, on statins.    5.  Hypokalemia, on replacement protocol.    6.  On Eliquis for DVT prophylaxis.    7.  CODE STATUS is full code.    8.  Estimated discharge date, next 1 to 2 days.     Copied text on this note has been reviewed by me on 04/06/2023      Bob Pretty MD  [facility]  04/06/23  14:20 EDT

## 2023-04-06 NOTE — PROGRESS NOTES
"  Daily Progress Note.   [address]  4/6/2023    Patient:  Name:  Agnieszka Rizzo  MRN:  [id number]  8/7/1930  89 y.o.  female         CC:ae of bronchiectasis    Interval History:    No acute events overnight remains on 2 L however saturation is 100% on this.  Afebrile overnight.  No lethargy or confusion.  Tolerating a diet no emesis.  In good spirits today.    Physical Exam:  /75 (BP Location: Right arm, Patient Position: Lying)   Pulse 80   Temp 97.9 °F (36.6 °C) (Oral)   Resp 18   Ht 160 cm (63\")   Wt 54.5 kg (120 lb 1.6 oz)   SpO2 98%   BMI 21.27 kg/m²   Body mass index is 21.27 kg/m².    Intake/Output Summary (Last 24 hours) at 4/6/2023 1115  Last data filed at 4/6/2023 0950  Gross per 24 hour   Intake 240 ml   Output --   Net 240 ml     General appearance: Nontoxic awake pleasant, conversant   Eyes: Anicteric sclerae, moist conjunctivae;   HENT: Atraumatic; oropharynx clear with moist mucous membranes   Neck: Trachea midline; supple  Lungs:  Still has rhonchi diffusely may be slightly improved, with normal respiratory effort and no intercostal retractions  CV: RRR, no rub  Abdomen: Bowel sounds positive nonrigid  Extremities: No significant peripheral edema   Skin: Normal temperature, turgor and texture; no rash  Psych: Appropriate affect, alert and oriented   Neuro cranials 2 through 12 grossly intact speech intact moves all extremities    Data Review:  Notable Labs:  Results from last 7 days   Lab Units 04/06/23  0616 04/05/23  0619 04/04/23 0215 04/03/23  1851   WBC 10*3/mm3 10.88* 9.66 7.08 9.54   HEMOGLOBIN g/dL 11.4* 11.8* 11.3* 16.8*   PLATELETS 10*3/mm3 195 195 199 191     Results from last 7 days   Lab Units 04/06/23  0616 04/05/23  0619 04/04/23  0900 04/04/23 0215 04/03/23  1851   SODIUM mmol/L 136 138  --  135* 135*   POTASSIUM mmol/L 3.5 4.0  --  2.9* 3.3*   CHLORIDE mmol/L 95* 99  --  100 93*   CO2 mmol/L 29.0 27.4  --  23.0 30.2*   BUN mg/dL 16 18  --  20 21 "   CREATININE mg/dL 0.89 0.93  --  0.94 1.26*   GLUCOSE mg/dL 107* 120*  --  111* 140*   CALCIUM mg/dL 8.8 9.3  --  8.7 9.7*   MAGNESIUM mg/dL  --   --  2.5*  --   --    Estimated Creatinine Clearance: 34.7 mL/min (by C-G formula based on SCr of 0.89 mg/dL).    Results from last 7 days   Lab Units 04/06/23  0616 04/05/23  0619 04/04/23  0215 04/03/23  1852 04/03/23  1851   AST (SGOT) U/L  --   --   --   --  22   ALT (SGPT) U/L  --   --   --   --  16   PROCALCITONIN ng/mL  --   --   --   --  0.19   LACTATE mmol/L  --   --   --  1.1  --    PLATELETS 10*3/mm3 195 195 199  --  191             Imaging:  Reviewed chest images personally from past 3 days    Chest x-ray volume vascular congestion    ASSESSMENT  /  PLAN:  Bronchiectasis with acute exacerbation  Pneumonia  Hypokalemia  AHRF  Atrial fibrillation  Mitral valve regurgitation  Hypertension  Coronary disease  Chronic diastolic heart failure   .  Flutter valve  Chest physiotherapy  Hypertonic saline  Scheduled DuoNebs  D/c cough suppressant, we want her to clear sputum  cxr with stable areas of infiltrate however slight increase in vascular congestion-  increased diuretics today IV Lasix     Cover for Pseudomonas with cefepime - allergic to levaquin unforunately   Follow sputum cultures - obtained today, no growth yet  Ideally would like at least 5 days of antipseudomonal coverage however if no growth patient improving Pro-Tim low on morning labs and improved respiratory status could transition over to p.o. antibiotics her Levaquin allergy may be problematic with this.    Discussed with the patient.  Pt of Dr Tucker.    Guillermo Pimentel MD  [facility]  04/06/23  12:30 EDT

## 2023-04-06 NOTE — PROGRESS NOTES
"    Patient Name: Agnieszka Rizzo  :1930  89 y.o.      Patient Care Team:  Matt Rose MD as PCP - General (Family Medicine)  Marcie Robbins MD as Consulting Physician (Cardiology)  Nilesh Danielle MD as Consulting Physician (Urology)  Lina Morris RPH as Pharmacist  Lev Mckeon PharmD as Pharmacist (Pharmacy)  Rogelio Tucker MD as Consulting Physician (Pulmonary Disease)    Chief Complaint: follow up atrial fibrillation with RVR    Interval History: HR over the last 12-14 hours has been much improved.        Objective   Vital Signs  Temp:  [97.7 °F (36.5 °C)-98.7 °F (37.1 °C)] 97.9 °F (36.6 °C)  Heart Rate:  [] 80  Resp:  [18-20] 18  BP: (105-128)/(58-83) 128/75    Intake/Output Summary (Last 24 hours) at 2023 1118  Last data filed at 2023 0950  Gross per 24 hour   Intake 240 ml   Output --   Net 240 ml     Flowsheet Rows    Flowsheet Row First Filed Value   Admission Height 160 cm (63\") Documented at 2023 1844   Admission Weight 57.2 kg (126 lb 3.2 oz) Documented at 2023 1844          Physical Exam:   General Appearance:    Alert, cooperative, in no acute distress   Lungs:     Clear to auscultation.  Normal respiratory effort and rate.      Heart:    irregular rhythm and normal rate, normal S1 and S2, no murmurs, gallops or rubs.     Chest Wall:    No abnormalities observed   Abdomen:     Soft, nontender, positive bowel sounds.     Extremities:   no cyanosis, clubbing or edema.  No marked joint deformities.  Adequate musculoskeletal strength.       Results Review:    Results from last 7 days   Lab Units 23  0616   SODIUM mmol/L 136   POTASSIUM mmol/L 3.5   CHLORIDE mmol/L 95*   CO2 mmol/L 29.0   BUN mg/dL 16   CREATININE mg/dL 0.89   GLUCOSE mg/dL 107*   CALCIUM mg/dL 8.8     Results from last 7 days   Lab Units 23  0215 23  0015 23  1851   HSTROP T ng/L 25* 26* 30*     Results from last 7 days   Lab Units 23  0616   WBC 10*3/mm3 10.88* "   HEMOGLOBIN g/dL 11.4*   HEMATOCRIT % 34.4   PLATELETS 10*3/mm3 195         Results from last 7 days   Lab Units 04/04/23  0900   MAGNESIUM mg/dL 2.5*                   Medication Review:   apixaban, 2.5 mg, Oral, Q12H  atorvastatin, 40 mg, Oral, Nightly  budesonide-formoterol, 2 puff, Inhalation, BID - RT  cefepime, 2 g, Intravenous, Q12H  cetirizine, 10 mg, Oral, Daily  cholecalciferol, 1,000 Units, Oral, Daily  citalopram, 10 mg, Oral, Daily  dextromethorphan polistirex ER, 60 mg, Oral, Q12H  ferrous sulfate, 325 mg, Oral, Daily With Breakfast  guaiFENesin, 600 mg, Oral, BID  ipratropium-albuterol, 3 mL, Nebulization, 4x Daily - RT  lactobacillus acidophilus, 1 capsule, Oral, Daily  metoprolol tartrate, 50 mg, Oral, Q12H  potassium chloride, 10 mEq, Oral, Daily  senna-docusate sodium, 2 tablet, Oral, BID  sodium chloride, 3 mL, Nebulization, BID  torsemide, 20 mg, Oral, BID              Assessment & Plan   1. Acute hypoxic respiratory failure - being treated for pna. Fever, productive cough.   2. Persistent atrial fibrillation - rate rapid.status post 750 mcg total of IV digoxin. HR is improving.   3. Chronic diastolic heart failure - on oral torsemide. BNP is a little elevated. CXR with mild vascular congestion. Received torsemide this morning. Will give a little additional IV lasix.   4. Mild non obstructive coronary artery disease by cath 2007. Normal stress 2018. No anginal symptoms.   5. Elevated high sensitivity troponin , flat trend. Do not suspect acute coronary syndrome  6. Moderate mitral valve regurgitation  7. Hypertension   8. Left leg pain, tenderness with flex. Check lower extremity duplex though DVT less given uninterrupted anticoagulation.      PJ Day  [facility]  04/06/23  11:18 EDT

## 2023-04-06 NOTE — DISCHARGE PLACEMENT REQUEST
"Svetlana Rizzo (89 y.o. Female)     Date of Birth   08/07/1930    Social Security Number       Address   [address]  [place] [id number]    Home Phone   [phone]    MRN   [id number]       [employer]ism   Rastafarian    Marital Status                               Admission Date   4/3/23    Admission Type   Emergency    Admitting Provider   Bob Pretty MD    Attending Provider   Bob Pretty MD    Department, Room/Bed   [address], S[address]/1       Discharge Date       Discharge Disposition       Discharge Destination                               Attending Provider: Bob Pretty MD    Allergies: Amlodipine Besylate, Aspirin, Bactrim [Sulfamethoxazole-trimethoprim], Erythromycin, Levaquin [Levofloxacin], Nitrofurantoin, Ramipril    Isolation: None   Infection: None   Code Status: CPR    Ht: 160 cm (63\")   Wt: 54.5 kg (120 lb 1.6 oz)    Admission Cmt: None   Principal Problem: Sepsis due to pneumonia [J18.9,A41.9]                 Active Insurance as of 4/3/2023     Primary Coverage     Payor Plan Insurance Group Employer/Plan Group    MEDICARE MEDICARE A & B      Payor Plan Address Payor Plan Phone Number Payor Plan Fax Number Effective Dates    [address] [phone]  8/1/1995 - None Entered    [facility] [place] [address]       Subscriber Name Subscriber Birth Date Member ID       SVETLANA RIZZO 8/7/1930 [id number]           Secondary Coverage     Payor Plan Insurance Group Employer/Plan Group    AARP MC SUP AARP HEALTH CARE OPTIONS PLAN F     Payor Plan Address Payor Plan Phone Number Payor Plan Fax Number Effective Dates    [facility] [phone]  7/1/2014 - None Entered    [address]       [facility] [id number]       Subscriber Name Subscriber Birth Date Member ID       SVETLANA RIZZO 8/7/1930 [id number]                 Emergency Contacts      (Rel.) Home Phone Work Phone Mobile Phone    Michelle Zaldivar (Daughter) [phone] " -- [phone]    Handy Rizzo ([facility]) [phone] -- --    Isaias Zaldivar ([facility]) ([facility]) [phone] -- --

## 2023-04-06 NOTE — PLAN OF CARE
Goal Outcome Evaluation:  Plan of Care Reviewed With: patient        Progress: improving   Pt a/o x4, 2L NC, VSS. IVABT. Breathing tx's. Doppler pending to r/o LLE DVT. WCTM.

## 2023-04-06 NOTE — PLAN OF CARE
Goal Outcome Evaluation:      VSS, 2L NC, IV antibiotics continued, no complaints of pain. HR controlled Afib. Patient states her cough is somewhat improved, although it continues to be strong/congested. Ambulates to bathroom with walker/standby assist. All needs met.

## 2023-04-07 LAB
ANION GAP SERPL CALCULATED.3IONS-SCNC: 10.6 MMOL/L (ref 5–15)
BACTERIA SPEC RESP CULT: NORMAL
BASOPHILS # BLD AUTO: 0.06 10*3/MM3 (ref 0–0.2)
BASOPHILS NFR BLD AUTO: 0.7 % (ref 0–1.5)
BUN SERPL-MCNC: 21 MG/DL (ref 8–23)
BUN/CREAT SERPL: 29.2 (ref 7–25)
CALCIUM SPEC-SCNC: 9.2 MG/DL (ref 8.2–9.6)
CHLORIDE SERPL-SCNC: 97 MMOL/L (ref 98–107)
CO2 SERPL-SCNC: 28.4 MMOL/L (ref 22–29)
CREAT SERPL-MCNC: 0.72 MG/DL (ref 0.57–1)
DEPRECATED RDW RBC AUTO: 45.8 FL (ref 37–54)
EGFRCR SERPLBLD CKD-EPI 2021: 78.6 ML/MIN/1.73
EOSINOPHIL # BLD AUTO: 0.3 10*3/MM3 (ref 0–0.4)
EOSINOPHIL NFR BLD AUTO: 3.6 % (ref 0.3–6.2)
ERYTHROCYTE [DISTWIDTH] IN BLOOD BY AUTOMATED COUNT: 13.3 % (ref 12.3–15.4)
GLUCOSE SERPL-MCNC: 112 MG/DL (ref 65–99)
GRAM STN SPEC: NORMAL
HCT VFR BLD AUTO: 37.3 % (ref 34–46.6)
HGB BLD-MCNC: 12.3 G/DL (ref 12–15.9)
LYMPHOCYTES # BLD AUTO: 1.62 10*3/MM3 (ref 0.7–3.1)
LYMPHOCYTES NFR BLD AUTO: 19.7 % (ref 19.6–45.3)
MCH RBC QN AUTO: 30.9 PG (ref 26.6–33)
MCHC RBC AUTO-ENTMCNC: 33 G/DL (ref 31.5–35.7)
MCV RBC AUTO: 93.7 FL (ref 79–97)
MONOCYTES # BLD AUTO: 0.93 10*3/MM3 (ref 0.1–0.9)
MONOCYTES NFR BLD AUTO: 11.3 % (ref 5–12)
NEUTROPHILS NFR BLD AUTO: 5.27 10*3/MM3 (ref 1.7–7)
NEUTROPHILS NFR BLD AUTO: 64.2 % (ref 42.7–76)
NT-PROBNP SERPL-MCNC: 2532 PG/ML (ref 0–1800)
PLATELET # BLD AUTO: 204 10*3/MM3 (ref 140–450)
PMV BLD AUTO: 10.2 FL (ref 6–12)
POTASSIUM SERPL-SCNC: 3.1 MMOL/L (ref 3.5–5.2)
PROCALCITONIN SERPL-MCNC: 0.15 NG/ML (ref 0–0.25)
RBC # BLD AUTO: 3.98 10*6/MM3 (ref 3.77–5.28)
SODIUM SERPL-SCNC: 136 MMOL/L (ref 136–145)
WBC NRBC COR # BLD: 8.22 10*3/MM3 (ref 3.4–10.8)

## 2023-04-07 PROCEDURE — 94799 UNLISTED PULMONARY SVC/PX: CPT

## 2023-04-07 PROCEDURE — 85025 COMPLETE CBC W/AUTO DIFF WBC: CPT | Performed by: INTERNAL MEDICINE

## 2023-04-07 PROCEDURE — 94760 N-INVAS EAR/PLS OXIMETRY 1: CPT

## 2023-04-07 PROCEDURE — 84145 PROCALCITONIN (PCT): CPT | Performed by: INTERNAL MEDICINE

## 2023-04-07 PROCEDURE — 94664 DEMO&/EVAL PT USE INHALER: CPT

## 2023-04-07 PROCEDURE — 94668 MNPJ CHEST WALL SBSQ: CPT

## 2023-04-07 PROCEDURE — 83880 ASSAY OF NATRIURETIC PEPTIDE: CPT | Performed by: INTERNAL MEDICINE

## 2023-04-07 PROCEDURE — 80048 BASIC METABOLIC PNL TOTAL CA: CPT | Performed by: INTERNAL MEDICINE

## 2023-04-07 PROCEDURE — 99232 SBSQ HOSP IP/OBS MODERATE 35: CPT | Performed by: NURSE PRACTITIONER

## 2023-04-07 PROCEDURE — 25010000002 CEFEPIME PER 500 MG: Performed by: INTERNAL MEDICINE

## 2023-04-07 PROCEDURE — 94761 N-INVAS EAR/PLS OXIMETRY MLT: CPT

## 2023-04-07 RX ORDER — FERROUS SULFATE 325(65) MG
325 TABLET ORAL EVERY OTHER DAY
Status: DISCONTINUED | OUTPATIENT
Start: 2023-04-08 | End: 2023-04-08

## 2023-04-07 RX ORDER — POTASSIUM CHLORIDE 750 MG/1
40 TABLET, FILM COATED, EXTENDED RELEASE ORAL AS NEEDED
Status: DISCONTINUED | OUTPATIENT
Start: 2023-04-07 | End: 2023-04-09 | Stop reason: HOSPADM

## 2023-04-07 RX ORDER — POTASSIUM CHLORIDE 1.5 G/1.77G
40 POWDER, FOR SOLUTION ORAL AS NEEDED
Status: DISCONTINUED | OUTPATIENT
Start: 2023-04-07 | End: 2023-04-09 | Stop reason: HOSPADM

## 2023-04-07 RX ADMIN — FERROUS SULFATE TAB 325 MG (65 MG ELEMENTAL FE) 325 MG: 325 (65 FE) TAB at 08:23

## 2023-04-07 RX ADMIN — CETIRIZINE HYDROCHLORIDE 10 MG: 10 TABLET ORAL at 08:23

## 2023-04-07 RX ADMIN — GUAIFENESIN 600 MG: 600 TABLET, EXTENDED RELEASE ORAL at 08:23

## 2023-04-07 RX ADMIN — IPRATROPIUM BROMIDE AND ALBUTEROL SULFATE 3 ML: 2.5; .5 SOLUTION RESPIRATORY (INHALATION) at 10:29

## 2023-04-07 RX ADMIN — IPRATROPIUM BROMIDE AND ALBUTEROL SULFATE 3 ML: 2.5; .5 SOLUTION RESPIRATORY (INHALATION) at 19:19

## 2023-04-07 RX ADMIN — METOPROLOL TARTRATE 50 MG: 50 TABLET, FILM COATED ORAL at 08:23

## 2023-04-07 RX ADMIN — ISODIUM CHLORIDE 3 ML: 0.03 SOLUTION RESPIRATORY (INHALATION) at 07:59

## 2023-04-07 RX ADMIN — GUAIFENESIN 600 MG: 600 TABLET, EXTENDED RELEASE ORAL at 21:30

## 2023-04-07 RX ADMIN — BUDESONIDE AND FORMOTEROL FUMARATE DIHYDRATE 2 PUFF: 160; 4.5 AEROSOL RESPIRATORY (INHALATION) at 19:54

## 2023-04-07 RX ADMIN — IPRATROPIUM BROMIDE AND ALBUTEROL SULFATE 3 ML: 2.5; .5 SOLUTION RESPIRATORY (INHALATION) at 07:58

## 2023-04-07 RX ADMIN — Medication 1 CAPSULE: at 08:23

## 2023-04-07 RX ADMIN — METOPROLOL TARTRATE 50 MG: 50 TABLET, FILM COATED ORAL at 21:31

## 2023-04-07 RX ADMIN — POTASSIUM CHLORIDE 40 MEQ: 750 TABLET, EXTENDED RELEASE ORAL at 19:07

## 2023-04-07 RX ADMIN — ISODIUM CHLORIDE 3 ML: 0.03 SOLUTION RESPIRATORY (INHALATION) at 19:19

## 2023-04-07 RX ADMIN — CITALOPRAM 10 MG: 10 TABLET, FILM COATED ORAL at 08:23

## 2023-04-07 RX ADMIN — CEFEPIME 2 G: 2 INJECTION, POWDER, FOR SOLUTION INTRAVENOUS at 08:22

## 2023-04-07 RX ADMIN — TORSEMIDE 20 MG: 20 TABLET ORAL at 21:30

## 2023-04-07 RX ADMIN — POTASSIUM CHLORIDE 10 MEQ: 750 TABLET, EXTENDED RELEASE ORAL at 08:24

## 2023-04-07 RX ADMIN — APIXABAN 2.5 MG: 2.5 TABLET, FILM COATED ORAL at 21:31

## 2023-04-07 RX ADMIN — APIXABAN 2.5 MG: 2.5 TABLET, FILM COATED ORAL at 08:22

## 2023-04-07 RX ADMIN — ATORVASTATIN CALCIUM 40 MG: 20 TABLET, FILM COATED ORAL at 21:30

## 2023-04-07 RX ADMIN — BUDESONIDE AND FORMOTEROL FUMARATE DIHYDRATE 2 PUFF: 160; 4.5 AEROSOL RESPIRATORY (INHALATION) at 07:59

## 2023-04-07 RX ADMIN — Medication 1000 UNITS: at 08:23

## 2023-04-07 RX ADMIN — TORSEMIDE 20 MG: 20 TABLET ORAL at 08:23

## 2023-04-07 NOTE — CASE MANAGEMENT/SOCIAL WORK
Continued Stay Note  [facility]     Patient Name: Agnieszka Rizzo  MRN: [id number]  Today's Date: 4/7/2023    Admit Date: 4/3/2023    Plan: Home with family   Discharge Plan     Row Name 04/07/23 1400       Plan    Plan Home with family    Patient/Family in Agreement with Plan yes    Plan Comments Spoke with patient at bedside. Plan remains home with family to transport. Referral to [employer] for portable o2 tank.               Discharge Codes    No documentation.               Expected Discharge Date and Time     Expected Discharge Date Expected Discharge Time    Apr 7, 2023             Liyah Duenas RN

## 2023-04-07 NOTE — PROGRESS NOTES
Name: Agnieszka Rizzo ADMIT: 4/3/2023   : 1930  PCP: Matt Rose MD    MRN: [id number] LOS: 4 days   AGE/SEX: 89 y.o. female  ROOM: [facility]     Subjective   Subjective       Patient is lying on the bed and is in no major distress. Currently on 2 L of oxygen which will continued.  Denies nausea, vomiting, chest pain, palpitations, dizziness.       Objective   Objective   Vital Signs  Temp:  [97.8 °F (36.6 °C)-98.3 °F (36.8 °C)] 97.9 °F (36.6 °C)  Heart Rate:  [] 88  Resp:  [18] 18  BP: (105-124)/(62-71) 124/71  SpO2:  [90 %-100 %] 100 %  on  Flow (L/min):  [2-40] 40;   Device (Oxygen Therapy): nasal cannula  Body mass index is 21.27 kg/m².  Physical Exam   HEENT: PERRLA, extraocular movements intact, Scleras no icterus  Neck: Supple, no JVD  Cardiovascular: Irregular with normal S1 and S2  Respiratory: Diminished breath sounds with faint wheezes and rhonchi  GI: Soft, nontender, bowel sounds are present  Extremities: No edema, palpable pedal pulses    Results Review     I reviewed the patient's new clinical results.  Results from last 7 days   Lab Units 23  0615 04/06/23  0616 23  0215   WBC 10*3/mm3 8.22 10.88* 9.66 7.08   HEMOGLOBIN g/dL 12.3 11.4* 11.8* 11.3*   PLATELETS 10*3/mm3 204 195 195 199     Results from last 7 days   Lab Units 23  0615 23  0616 23  0619 23  0215   SODIUM mmol/L 136 136 138 135*   POTASSIUM mmol/L 3.1* 3.5 4.0 2.9*   CHLORIDE mmol/L 97* 95* 99 100   CO2 mmol/L 28.4 29.0 27.4 23.0   BUN mg/dL 21 16 18 20   CREATININE mg/dL 0.72 0.89 0.93 0.94   GLUCOSE mg/dL 112* 107* 120* 111*   EGFR mL/min/1.73 78.6 60.9 57.8* 57.0*     Results from last 7 days   Lab Units 23  1851   ALBUMIN g/dL 4.2   BILIRUBIN mg/dL 1.1   ALK PHOS U/L 107   AST (SGOT) U/L 22   ALT (SGPT) U/L 16     Results from last 7 days   Lab Units 23  0615 23  0616 23  0619 23  0900 23  0215 23  1851   CALCIUM mg/dL 9.2  8.8 9.3  --  8.7 9.7*   ALBUMIN g/dL  --   --   --   --   --  4.2   MAGNESIUM mg/dL  --   --   --  2.5*  --   --      Results from last 7 days   Lab Units 04/07/23  0615 04/03/23  1852 04/03/23  1851   PROCALCITONIN ng/mL 0.15  --  0.19   LACTATE mmol/L  --  1.1  --      No results found for: HGBA1C, POCGLU    No radiology results for the last day  I have personally reviewed all medications:  Scheduled Medications  apixaban, 2.5 mg, Oral, Q12H  atorvastatin, 40 mg, Oral, Nightly  budesonide-formoterol, 2 puff, Inhalation, BID - RT  cholecalciferol, 1,000 Units, Oral, Daily  citalopram, 10 mg, Oral, Daily  [START ON 4/8/2023] ferrous sulfate, 325 mg, Oral, Every Other Day  guaiFENesin, 600 mg, Oral, BID  ipratropium-albuterol, 3 mL, Nebulization, 4x Daily - RT  lactobacillus acidophilus, 1 capsule, Oral, Daily  metoprolol tartrate, 50 mg, Oral, Q12H  potassium chloride, 10 mEq, Oral, Daily  senna-docusate sodium, 2 tablet, Oral, BID  sodium chloride, 3 mL, Nebulization, BID  torsemide, 20 mg, Oral, BID    Infusions   Diet  Diet: Cardiac Diets; Healthy Heart (2-3 Na+); Texture: Regular Texture (IDDSI 7); Fluid Consistency: Thin (IDDSI 0)    I have personally reviewed:  [x]  Laboratory   [x]  Microbiology   [x]  Radiology   [x]  EKG/Telemetry  [x]  Cardiology/Vascular   [x]  Pathology    [x]  Records       Assessment/Plan     Active Hospital Problems    Diagnosis  POA   • **Sepsis due to pneumonia [J18.9, A41.9]  Yes   • COPD (chronic obstructive pulmonary disease) [J44.9]  Yes   • Bronchiectasis [J47.9]  Yes   • Hypokalemia [E87.6]  Yes   • Chronic diastolic CHF (congestive heart failure) (HCC) [I50.32]  Yes   • Acute on chronic respiratory failure with hypoxia [J96.21]  Yes   • Primary hypertension [I10]  Yes      Resolved Hospital Problems   No resolved problems to display.       89 y.o. female admitted with Sepsis due to pneumonia.    1. Sepsis secondary to pneumonia/bronchiectasis/COPD flare, continue with  DuoNebs, Symbicort, mucolytic's, cough suppressants and pulmonary is following along as well.  On broad-spectrum antibiotics which include cefepime.  Currently on 2 L of oxygen and have encouraged her to use incentive spirometry.  Appreciate pulmonary input.    2.  Chronic atrial fibrillation, rate is better controlled and is on Eliquis, digoxin, metoprolol.  Cardiology following along.    3.  Chronic diastolic heart failure, currently compensated and is on torsemide which is being continued.    4.  Hyperlipidemia, on statins.    5.  Hypokalemia, on replacement protocol. K is 3.1 today.    6.  On Eliquis for DVT prophylaxis.    7.  CODE STATUS is full code.    8.  Estimated discharge date, next 1 to 2 days.     Copied text on this note has been reviewed by me on 04/07/2023      Bob Pretty MD  [facility]  04/07/23  17:12 EDT

## 2023-04-07 NOTE — PLAN OF CARE
Problem: Adult Inpatient Plan of Care  Goal: Plan of Care Review  Outcome: Ongoing, Progressing  Flowsheets (Taken 4/7/2023 1727)  Progress: improving  Plan of Care Reviewed With: patient  Goal: Patient-Specific Goal (Individualized)  Outcome: Ongoing, Progressing  Goal: Absence of Hospital-Acquired Illness or Injury  Outcome: Ongoing, Progressing  Intervention: Identify and Manage Fall Risk  Recent Flowsheet Documentation  Taken 4/7/2023 1213 by Guillermo Bocanegra RN  Safety Promotion/Fall Prevention:   assistive device/personal items within reach   clutter free environment maintained   activity supervised   toileting scheduled   safety round/check completed   room organization consistent   nonskid shoes/slippers when out of bed   fall prevention program maintained  Taken 4/7/2023 1045 by Guillermo Bocanegra RN  Safety Promotion/Fall Prevention:   activity supervised   assistive device/personal items within reach   clutter free environment maintained   toileting scheduled   safety round/check completed   room organization consistent   nonskid shoes/slippers when out of bed   fall prevention program maintained  Taken 4/7/2023 0838 by Guillermo Bocanegra RN  Safety Promotion/Fall Prevention:   activity supervised   assistive device/personal items within reach   clutter free environment maintained   safety round/check completed   toileting scheduled   room organization consistent   nonskid shoes/slippers when out of bed   fall prevention program maintained  Intervention: Prevent Skin Injury  Recent Flowsheet Documentation  Taken 4/7/2023 1213 by Guillermo Bocanegra RN  Body Position:   position changed independently   sitting up in bed  Taken 4/7/2023 1045 by Guillermo Bocanegra RN  Body Position:   position changed independently   sitting up in bed  Taken 4/7/2023 0838 by Guillermo Bocanegra RN  Body Position:   position changed independently   sitting up in bed  Intervention: Prevent and Manage VTE (Venous Thromboembolism)  Risk  Recent Flowsheet Documentation  Taken 4/7/2023 0838 by Guillermo Bocanegra RN  VTE Prevention/Management:   sequential compression devices off   patient refused intervention  Intervention: Prevent Infection  Recent Flowsheet Documentation  Taken 4/7/2023 1213 by Guillermo Bocanegra RN  Infection Prevention:   hand hygiene promoted   rest/sleep promoted  Taken 4/7/2023 1045 by Guillermo Bocanegra RN  Infection Prevention:   hand hygiene promoted   rest/sleep promoted  Taken 4/7/2023 0838 by Guillermo Bocanegra RN  Infection Prevention:   hand hygiene promoted   rest/sleep promoted  Goal: Optimal Comfort and Wellbeing  Outcome: Ongoing, Progressing  Goal: Readiness for Transition of Care  Outcome: Ongoing, Progressing     Problem: Heart Failure Comorbidity  Goal: Maintenance of Heart Failure Symptom Control  Outcome: Ongoing, Progressing  Intervention: Maintain Heart Failure-Management  Recent Flowsheet Documentation  Taken 4/7/2023 1213 by Guillermo Bocanegra RN  Medication Review/Management: medications reviewed  Taken 4/7/2023 1045 by Guillermo Bocanegra RN  Medication Review/Management: medications reviewed  Taken 4/7/2023 0838 by Guillermo Bocanegra RN  Medication Review/Management: medications reviewed     Problem: Hypertension Comorbidity  Goal: Blood Pressure in Desired Range  Outcome: Ongoing, Progressing  Intervention: Maintain Blood Pressure Management  Recent Flowsheet Documentation  Taken 4/7/2023 1213 by Guillermo Bocanegra RN  Medication Review/Management: medications reviewed  Taken 4/7/2023 1045 by Guillermo Bocanegra RN  Medication Review/Management: medications reviewed  Taken 4/7/2023 0838 by Guillermo Bocanegra RN  Medication Review/Management: medications reviewed     Problem: Fall Injury Risk  Goal: Absence of Fall and Fall-Related Injury  Outcome: Ongoing, Progressing  Intervention: Identify and Manage Contributors  Recent Flowsheet Documentation  Taken 4/7/2023 1213 by Guillermo Bocanegra RN  Medication  Review/Management: medications reviewed  Taken 4/7/2023 1045 by Guillermo Bocanegra RN  Medication Review/Management: medications reviewed  Taken 4/7/2023 0838 by Guillermo Bocanegra RN  Medication Review/Management: medications reviewed  Intervention: Promote Injury-Free Environment  Recent Flowsheet Documentation  Taken 4/7/2023 1213 by Guillermo Bocanegra RN  Safety Promotion/Fall Prevention:   assistive device/personal items within reach   clutter free environment maintained   activity supervised   toileting scheduled   safety round/check completed   room organization consistent   nonskid shoes/slippers when out of bed   fall prevention program maintained  Taken 4/7/2023 1045 by Guillermo Bocanegra RN  Safety Promotion/Fall Prevention:   activity supervised   assistive device/personal items within reach   clutter free environment maintained   toileting scheduled   safety round/check completed   room organization consistent   nonskid shoes/slippers when out of bed   fall prevention program maintained  Taken 4/7/2023 0838 by Guillermo Bocanegra, RN  Safety Promotion/Fall Prevention:   activity supervised   assistive device/personal items within reach   clutter free environment maintained   safety round/check completed   toileting scheduled   room organization consistent   nonskid shoes/slippers when out of bed   fall prevention program maintained   Goal Outcome Evaluation:  Plan of Care Reviewed With: patient        Progress: improving

## 2023-04-07 NOTE — PROGRESS NOTES
"    Patient Name: Agnieszka Rizzo  :1930  89 y.o.      Patient Care Team:  Matt Rose MD as PCP - General (Family Medicine)  Marcie Robbins MD as Consulting Physician (Cardiology)  Nilesh Danielle MD as Consulting Physician (Urology)  Lina Morris RPH as Pharmacist  Lev Mckeon PharmD as Pharmacist (Pharmacy)  Rogelio Tucker MD as Consulting Physician (Pulmonary Disease)    Chief Complaint: follow up atrial fibrillation with RVR    Interval History: subjectively she doesn't think she urinated much more than normal yesterday. I/O not fully documented. No weight yet today.       Objective   Vital Signs  Temp:  [97.8 °F (36.6 °C)-98.3 °F (36.8 °C)] 98.1 °F (36.7 °C)  Heart Rate:  [78-98] 89  Resp:  [18] 18  BP: (105-119)/(62-73) 119/65    Intake/Output Summary (Last 24 hours) at 2023 0956  Last data filed at 2023 0838  Gross per 24 hour   Intake 450 ml   Output --   Net 450 ml     Flowsheet Rows    Flowsheet Row First Filed Value   Admission Height 160 cm (63\") Documented at 2023 184   Admission Weight 57.2 kg (126 lb 3.2 oz) Documented at 2023 1844          Physical Exam:   General Appearance:    Alert, cooperative, in no acute distress   Lungs:     Clear to auscultation.  Normal respiratory effort and rate.      Heart:    irregular rhythm and normal rate, normal S1 and S2, no murmurs, gallops or rubs.     Chest Wall:    No abnormalities observed   Abdomen:     Soft, nontender, positive bowel sounds.     Extremities:   no cyanosis, clubbing or edema.  No marked joint deformities.  Adequate musculoskeletal strength.       Results Review:    Results from last 7 days   Lab Units 23  0615   SODIUM mmol/L 136   POTASSIUM mmol/L 3.1*   CHLORIDE mmol/L 97*   CO2 mmol/L 28.4   BUN mg/dL 21   CREATININE mg/dL 0.72   GLUCOSE mg/dL 112*   CALCIUM mg/dL 9.2     Results from last 7 days   Lab Units 23  0215 23  0015 23  1851   HSTROP T ng/L 25* 26* 30*     Results " from last 7 days   Lab Units 04/07/23  0615   WBC 10*3/mm3 8.22   HEMOGLOBIN g/dL 12.3   HEMATOCRIT % 37.3   PLATELETS 10*3/mm3 204         Results from last 7 days   Lab Units 04/04/23  0900   MAGNESIUM mg/dL 2.5*                   Medication Review:   apixaban, 2.5 mg, Oral, Q12H  atorvastatin, 40 mg, Oral, Nightly  budesonide-formoterol, 2 puff, Inhalation, BID - RT  cefepime, 2 g, Intravenous, Q12H  cetirizine, 10 mg, Oral, Daily  cholecalciferol, 1,000 Units, Oral, Daily  citalopram, 10 mg, Oral, Daily  ferrous sulfate, 325 mg, Oral, Daily With Breakfast  guaiFENesin, 600 mg, Oral, BID  ipratropium-albuterol, 3 mL, Nebulization, 4x Daily - RT  lactobacillus acidophilus, 1 capsule, Oral, Daily  metoprolol tartrate, 50 mg, Oral, Q12H  potassium chloride, 10 mEq, Oral, Daily  senna-docusate sodium, 2 tablet, Oral, BID  sodium chloride, 3 mL, Nebulization, BID  torsemide, 20 mg, Oral, BID              Assessment & Plan   1. Acute hypoxic respiratory failure - being treated for pna. Fever, productive cough.   2. Persistent atrial fibrillation - rate rapid.status post 750 mcg total of IV digoxin. HR is improving.   3. Chronic diastolic heart failure - on oral torsemide. BNP is a little elevated. CXR with mild vascular congestion. On torsemide. Got a dose of IV lasix yesterday - subjectively not much different today. Weight pending. I/O not strict.   4. Mild non obstructive coronary artery disease by cath 2007. Normal stress 2018. No anginal symptoms.   5. Elevated high sensitivity troponin , flat trend. Do not suspect acute coronary syndrome  6. Moderate mitral valve regurgitation  7. Hypertension   8. Left leg pain, tenderness with flex. No venous abnormality on the left. Chronic right DVT noted.    Continue torsemide.    PJ Day  [facility]  04/07/23  09:56 EDT

## 2023-04-07 NOTE — PLAN OF CARE
Goal Outcome Evaluation:  Plan of Care Reviewed With: patient  Patient had an uneventful night. Vital signs remain stable.

## 2023-04-07 NOTE — PROGRESS NOTES
Dr. DANIELLA Jackson    [address]        Patient ID:  Name:  Agnieszka Rizzo  MRN:  [id number]  8/7/1930  89 y.o.  female            CC/Reason for visit: Bronchiectasis exacerbation    Interval hx: Still coughing frequently.  Producing some scant yellow sputum but having difficulty expectorating.  Still requiring oxygen with exertion    ROS: Denies fever, chills or hemoptysis    Vitals:  Vitals:    04/07/23 0758 04/07/23 0759 04/07/23 1029 04/07/23 1328   BP:    124/71   BP Location:    Right arm   Patient Position:    Lying   Pulse: 90 89 103 88   Resp: 18 18 18 18   Temp:    97.9 °F (36.6 °C)   TempSrc:    Oral   SpO2: 97% 96% 90% 100%   Weight:       Height:               Body mass index is 21.27 kg/m².    Intake/Output Summary (Last 24 hours) at 4/7/2023 1335  Last data filed at 4/7/2023 0838  Gross per 24 hour   Intake 450 ml   Output --   Net 450 ml       Exam:  GEN:  No distress  Alert, oriented x 3.   LUNGS: Some scattered rhonchi and squeaks bilat, no use of accessory muscles  CV:  Normal S1S2, without murmur, no edema  ABD:  Non tender, no enlarged liver or masses      Scheduled meds:  apixaban, 2.5 mg, Oral, Q12H  atorvastatin, 40 mg, Oral, Nightly  budesonide-formoterol, 2 puff, Inhalation, BID - RT  cefepime, 2 g, Intravenous, Q12H  cetirizine, 10 mg, Oral, Daily  cholecalciferol, 1,000 Units, Oral, Daily  citalopram, 10 mg, Oral, Daily  ferrous sulfate, 325 mg, Oral, Daily With Breakfast  guaiFENesin, 600 mg, Oral, BID  ipratropium-albuterol, 3 mL, Nebulization, 4x Daily - RT  lactobacillus acidophilus, 1 capsule, Oral, Daily  metoprolol tartrate, 50 mg, Oral, Q12H  potassium chloride, 10 mEq, Oral, Daily  senna-docusate sodium, 2 tablet, Oral, BID  sodium chloride, 3 mL, Nebulization, BID  torsemide, 20 mg, Oral, BID      IV meds:                           Data Review:   I reviewed the patient's medications and new clinical results.    COVID19   Date Value Ref Range Status   04/03/2023  Not Detected Not Detected - Ref. Range Final         Lab Results   Component Value Date    CALCIUM 9.2 04/07/2023    PHOS 3.4 07/19/2022    MG 2.5 (H) 04/04/2023    MG 1.8 07/14/2022    MG 1.9 07/13/2022     Results from last 7 days   Lab Units 04/07/23  0615 04/06/23  0616 04/05/23  0619 04/04/23  0215 04/03/23  1851   SODIUM mmol/L 136 136 138   < > 135*   POTASSIUM mmol/L 3.1* 3.5 4.0   < > 3.3*   CHLORIDE mmol/L 97* 95* 99   < > 93*   CO2 mmol/L 28.4 29.0 27.4   < > 30.2*   BUN mg/dL 21 16 18   < > 21   CREATININE mg/dL 0.72 0.89 0.93   < > 1.26*   CALCIUM mg/dL 9.2 8.8 9.3   < > 9.7*   BILIRUBIN mg/dL  --   --   --   --  1.1   ALK PHOS U/L  --   --   --   --  107   ALT (SGPT) U/L  --   --   --   --  16   AST (SGOT) U/L  --   --   --   --  22   GLUCOSE mg/dL 112* 107* 120*   < > 140*   WBC 10*3/mm3 8.22 10.88* 9.66   < > 9.54   HEMOGLOBIN g/dL 12.3 11.4* 11.8*   < > 16.8*   PLATELETS 10*3/mm3 204 195 195   < > 191   PROBNP pg/mL  --   --   --   --  3,211.0*   PROCALCITONIN ng/mL 0.15  --   --   --  0.19    < > = values in this interval not displayed.     Results from last 7 days   Lab Units 04/05/23  1321 04/03/23  1856 04/03/23  1851   BLOODCX   --  No growth at 3 days No growth at 3 days   RESPCX  Light growth (2+) Normal respiratory fortino. No S. aureus or Pseudomonas aeruginosa detected. Final report.  --   --              ASSESSMENT:     Sepsis due to pneumonia  Pseudomonas colonization of the airways    Acute on chronic respiratory failure with hypoxia    Chronic diastolic CHF (congestive heart failure) (formerly [facility])    Hypokalemia    Bronchiectasis With exacerbation    COPD (chronic obstructive pulmonary disease)        PLAN:  Continue weaning oxygen as tolerated.  Continue hypertonic saline and DuoNeb nebulizations to enhance clearance of respiratory secretions.  Continue with flutter valve.  Patient exactly frequently daily.  Stop cefepime since her sputum culture did not show Pseudomonas. We will continue to  follow      Pravin Jackson MD  4/7/2023

## 2023-04-08 ENCOUNTER — APPOINTMENT (OUTPATIENT)
Dept: GENERAL RADIOLOGY | Facility: HOSPITAL | Age: 88
DRG: 871 | End: 2023-04-08
Payer: MEDICARE

## 2023-04-08 LAB
ANION GAP SERPL CALCULATED.3IONS-SCNC: 11.2 MMOL/L (ref 5–15)
BACTERIA SPEC AEROBE CULT: NORMAL
BACTERIA SPEC AEROBE CULT: NORMAL
BASOPHILS # BLD AUTO: 0.14 10*3/MM3 (ref 0–0.2)
BASOPHILS NFR BLD AUTO: 1.7 % (ref 0–1.5)
BUN SERPL-MCNC: 18 MG/DL (ref 8–23)
BUN/CREAT SERPL: 23.1 (ref 7–25)
CALCIUM SPEC-SCNC: 9.6 MG/DL (ref 8.2–9.6)
CHLORIDE SERPL-SCNC: 97 MMOL/L (ref 98–107)
CO2 SERPL-SCNC: 29.8 MMOL/L (ref 22–29)
CREAT SERPL-MCNC: 0.78 MG/DL (ref 0.57–1)
DEPRECATED RDW RBC AUTO: 47.2 FL (ref 37–54)
EGFRCR SERPLBLD CKD-EPI 2021: 71.4 ML/MIN/1.73
EOSINOPHIL # BLD AUTO: 0.28 10*3/MM3 (ref 0–0.4)
EOSINOPHIL NFR BLD AUTO: 3.3 % (ref 0.3–6.2)
ERYTHROCYTE [DISTWIDTH] IN BLOOD BY AUTOMATED COUNT: 13.4 % (ref 12.3–15.4)
GLUCOSE SERPL-MCNC: 161 MG/DL (ref 65–99)
HCT VFR BLD AUTO: 41 % (ref 34–46.6)
HGB BLD-MCNC: 13 G/DL (ref 12–15.9)
LYMPHOCYTES # BLD AUTO: 2 10*3/MM3 (ref 0.7–3.1)
LYMPHOCYTES NFR BLD AUTO: 23.8 % (ref 19.6–45.3)
MCH RBC QN AUTO: 30.2 PG (ref 26.6–33)
MCHC RBC AUTO-ENTMCNC: 31.7 G/DL (ref 31.5–35.7)
MCV RBC AUTO: 95.3 FL (ref 79–97)
MONOCYTES # BLD AUTO: 0.64 10*3/MM3 (ref 0.1–0.9)
MONOCYTES NFR BLD AUTO: 7.6 % (ref 5–12)
NEUTROPHILS NFR BLD AUTO: 5.31 10*3/MM3 (ref 1.7–7)
NEUTROPHILS NFR BLD AUTO: 63 % (ref 42.7–76)
PLATELET # BLD AUTO: 252 10*3/MM3 (ref 140–450)
PMV BLD AUTO: 10.6 FL (ref 6–12)
POTASSIUM SERPL-SCNC: 3.5 MMOL/L (ref 3.5–5.2)
PROCALCITONIN SERPL-MCNC: 0.12 NG/ML (ref 0–0.25)
RBC # BLD AUTO: 4.3 10*6/MM3 (ref 3.77–5.28)
SODIUM SERPL-SCNC: 138 MMOL/L (ref 136–145)
WBC NRBC COR # BLD: 8.42 10*3/MM3 (ref 3.4–10.8)

## 2023-04-08 PROCEDURE — 94664 DEMO&/EVAL PT USE INHALER: CPT

## 2023-04-08 PROCEDURE — 94799 UNLISTED PULMONARY SVC/PX: CPT

## 2023-04-08 PROCEDURE — 85007 BL SMEAR W/DIFF WBC COUNT: CPT | Performed by: INTERNAL MEDICINE

## 2023-04-08 PROCEDURE — 84145 PROCALCITONIN (PCT): CPT | Performed by: INTERNAL MEDICINE

## 2023-04-08 PROCEDURE — 94760 N-INVAS EAR/PLS OXIMETRY 1: CPT

## 2023-04-08 PROCEDURE — 85025 COMPLETE CBC W/AUTO DIFF WBC: CPT | Performed by: INTERNAL MEDICINE

## 2023-04-08 PROCEDURE — 94761 N-INVAS EAR/PLS OXIMETRY MLT: CPT

## 2023-04-08 PROCEDURE — 99232 SBSQ HOSP IP/OBS MODERATE 35: CPT | Performed by: INTERNAL MEDICINE

## 2023-04-08 PROCEDURE — 80048 BASIC METABOLIC PNL TOTAL CA: CPT | Performed by: INTERNAL MEDICINE

## 2023-04-08 PROCEDURE — 94668 MNPJ CHEST WALL SBSQ: CPT

## 2023-04-08 PROCEDURE — 71046 X-RAY EXAM CHEST 2 VIEWS: CPT

## 2023-04-08 RX ORDER — DOXYCYCLINE 100 MG/1
100 CAPSULE ORAL EVERY 12 HOURS SCHEDULED
Status: DISCONTINUED | OUTPATIENT
Start: 2023-04-08 | End: 2023-04-09 | Stop reason: HOSPADM

## 2023-04-08 RX ORDER — DOXYCYCLINE 100 MG/1
100 CAPSULE ORAL EVERY 12 HOURS SCHEDULED
Qty: 3 CAPSULE | Refills: 0 | Status: SHIPPED | OUTPATIENT
Start: 2023-04-08 | End: 2023-04-10

## 2023-04-08 RX ADMIN — BUDESONIDE AND FORMOTEROL FUMARATE DIHYDRATE 2 PUFF: 160; 4.5 AEROSOL RESPIRATORY (INHALATION) at 07:33

## 2023-04-08 RX ADMIN — DOXYCYCLINE 100 MG: 100 CAPSULE ORAL at 15:22

## 2023-04-08 RX ADMIN — FERROUS SULFATE TAB 325 MG (65 MG ELEMENTAL FE) 325 MG: 325 (65 FE) TAB at 08:11

## 2023-04-08 RX ADMIN — GUAIFENESIN 600 MG: 600 TABLET, EXTENDED RELEASE ORAL at 08:11

## 2023-04-08 RX ADMIN — METOPROLOL TARTRATE 50 MG: 50 TABLET, FILM COATED ORAL at 20:38

## 2023-04-08 RX ADMIN — TORSEMIDE 20 MG: 20 TABLET ORAL at 20:38

## 2023-04-08 RX ADMIN — IPRATROPIUM BROMIDE AND ALBUTEROL SULFATE 3 ML: 2.5; .5 SOLUTION RESPIRATORY (INHALATION) at 15:15

## 2023-04-08 RX ADMIN — IPRATROPIUM BROMIDE AND ALBUTEROL SULFATE 3 ML: 2.5; .5 SOLUTION RESPIRATORY (INHALATION) at 07:32

## 2023-04-08 RX ADMIN — Medication 1000 UNITS: at 08:11

## 2023-04-08 RX ADMIN — BUDESONIDE AND FORMOTEROL FUMARATE DIHYDRATE 2 PUFF: 160; 4.5 AEROSOL RESPIRATORY (INHALATION) at 21:49

## 2023-04-08 RX ADMIN — APIXABAN 2.5 MG: 2.5 TABLET, FILM COATED ORAL at 08:11

## 2023-04-08 RX ADMIN — POTASSIUM CHLORIDE 10 MEQ: 750 TABLET, EXTENDED RELEASE ORAL at 08:11

## 2023-04-08 RX ADMIN — CITALOPRAM 10 MG: 10 TABLET, FILM COATED ORAL at 08:11

## 2023-04-08 RX ADMIN — DOXYCYCLINE 100 MG: 100 CAPSULE ORAL at 20:38

## 2023-04-08 RX ADMIN — Medication 10 ML: at 20:39

## 2023-04-08 RX ADMIN — APIXABAN 2.5 MG: 2.5 TABLET, FILM COATED ORAL at 20:38

## 2023-04-08 RX ADMIN — ATORVASTATIN CALCIUM 40 MG: 20 TABLET, FILM COATED ORAL at 20:38

## 2023-04-08 RX ADMIN — METOPROLOL TARTRATE 50 MG: 50 TABLET, FILM COATED ORAL at 08:11

## 2023-04-08 RX ADMIN — Medication 1 CAPSULE: at 08:11

## 2023-04-08 RX ADMIN — TORSEMIDE 20 MG: 20 TABLET ORAL at 08:11

## 2023-04-08 RX ADMIN — GUAIFENESIN 600 MG: 600 TABLET, EXTENDED RELEASE ORAL at 20:38

## 2023-04-08 NOTE — PROGRESS NOTES
"Patient Name: Agnieszka Rizzo  :1930  89 y.o.      Patient Care Team:  Matt Rose MD as PCP - General (Family Medicine)  Marcie Robbins MD as Consulting Physician (Cardiology)  Nilesh Danielle MD as Consulting Physician (Urology)  Lina Morris RPH as Pharmacist  Lev Mckeon PharmD as Pharmacist (Pharmacy)  Roeglio Tucekr MD as Consulting Physician (Pulmonary Disease)    Interval History:   Paroxysmal atrial fib    Subjective:  Following for A-fib    Objective   Vital Signs  Temp:  [97.4 °F (36.3 °C)-97.9 °F (36.6 °C)] 97.4 °F (36.3 °C)  Heart Rate:  [] 103  Resp:  [18-20] 20  BP: (111-124)/(61-74) 117/64    Intake/Output Summary (Last 24 hours) at 2023 1013  Last data filed at 2023 0900  Gross per 24 hour   Intake 300 ml   Output --   Net 300 ml     Flowsheet Rows    Flowsheet Row First Filed Value   Admission Height 160 cm (63\") Documented at 2023 1844   Admission Weight 57.2 kg (126 lb 3.2 oz) Documented at 2023 1844          Physical Exam:   General Appearance:    Alert, cooperative, in no acute distress   Lungs:     Clear to auscultation.  Normal respiratory effort and rate.      Heart:   Irregularly irregular with a normal rate.   Chest Wall:    No abnormalities observed   Abdomen:     Soft, nontender, positive bowel sounds.     Extremities:   no cyanosis, clubbing or edema.  No marked joint deformities.  Adequate musculoskeletal strength.       Results Review:    Results from last 7 days   Lab Units 23  0841   SODIUM mmol/L 138   POTASSIUM mmol/L 3.5   CHLORIDE mmol/L 97*   CO2 mmol/L 29.8*   BUN mg/dL 18   CREATININE mg/dL 0.78   GLUCOSE mg/dL 161*   CALCIUM mg/dL 9.6     Results from last 7 days   Lab Units 23  0215 23  0015 23  1851   HSTROP T ng/L 25* 26* 30*     Results from last 7 days   Lab Units 23  0841   WBC 10*3/mm3 8.42   HEMOGLOBIN g/dL 13.0   HEMATOCRIT % 41.0   PLATELETS 10*3/mm3 252             Results from last 7 days "   Lab Units 04/04/23  0900   MAGNESIUM mg/dL 2.5*             Medication Review:   apixaban, 2.5 mg, Oral, Q12H  atorvastatin, 40 mg, Oral, Nightly  budesonide-formoterol, 2 puff, Inhalation, BID - RT  cholecalciferol, 1,000 Units, Oral, Daily  citalopram, 10 mg, Oral, Daily  ferrous sulfate, 325 mg, Oral, Every Other Day  guaiFENesin, 600 mg, Oral, BID  ipratropium-albuterol, 3 mL, Nebulization, 4x Daily - RT  lactobacillus acidophilus, 1 capsule, Oral, Daily  metoprolol tartrate, 50 mg, Oral, Q12H  potassium chloride, 10 mEq, Oral, Daily  senna-docusate sodium, 2 tablet, Oral, BID  sodium chloride, 3 mL, Nebulization, BID  torsemide, 20 mg, Oral, BID              Assessment & Plan     1.  Acute hypoxic respiratory failure.  2.  Persistent atrial fibrillation.  This is always an issue for her when she comes in with respiratory issues.  When she is not acutely ill this is very well controlled.  Continue Eliquis.  3.  Chronic diastolic heart failure.  Given a dose of IV Lasix.  On p.o. torsemide.  3.  Nonobstructive coronary disease by cath in 2007.  Normal stress test 2018.  No indication for further ischemic evaluation at this time.  5.  Type II myocardial infarction with a flat but elevated high-sensitivity troponin.  6.  Moderate mitral regurgitation  7.  Hypertension  8.  Chronic left leg DVT.    This patient is very well-known to me.  I have taken care of her for years.  She always goes into rapid A-fib when she is acutely ill but it resolves once her acute medical issues have resolved.  Continue current medications.    Heart rate is now controlled.  I turned down her oxygen and her sats remained normal.  I am okay with discharge from a cardiac standpoint.  No further cardiac testing is needed.  Keep scheduled follow-up.    Marcie Robbins MD, [facility]  04/08/23  10:13 EDT

## 2023-04-08 NOTE — DISCHARGE SUMMARY
Patient Name: Agnieszka Rizzo  : 1930  MRN: [id number]    Date of Admission: 4/3/2023  Date of Discharge:  2023  Primary Care Physician: Matt Rose MD      Chief Complaint:   Cough, Fever, and Shortness of Breath      Discharge Diagnoses     Active Hospital Problems    Diagnosis  POA   • **Sepsis due to pneumonia [J18.9, A41.9]  Yes   • COPD (chronic obstructive pulmonary disease) [J44.9]  Yes   • Bronchiectasis [J47.9]  Yes   • Hypokalemia [E87.6]  Yes   • Chronic diastolic CHF (congestive heart failure) (HCC) [I50.32]  Yes   • Acute on chronic respiratory failure with hypoxia [J96.21]  Yes   • Primary hypertension [I10]  Yes      Resolved Hospital Problems   No resolved problems to display.        Hospital Course   Ms. Rizzo is a 89 y.o. female with a history of afib, CHF, CAD, COPD/bronchiectasis, nocturnal hypoxia, HTN, HLD that presents to [facility] complaining of cough, soa, fever. She has felt ill for nearly a week. She has an oximeter at home and has been noticing low O2 sats during the day. She uses O2 w/sleep at baseline. Cough has been mostly nonproductive. Denies chest pain, palpitations, n/v/d, dysuria. Self covid test yesterday reported as negative.     TMax 102.8. HR up to 140 in ED, most recent 108. On room air. WBC 9.54, Hgb 16.8. Procal 0.19. BC collected. BNP 3211. HS trop 30-->26-->25. RVP neg. Lactate 1.1. Gluc 140, Cr 1.26, Na 135, K 3.3, Cl 93, CO2 30.2, ca 9.7. UA 1+ blood. CXR: patch densities mid lungs could be developing infiltrates; cardiomegaly.    1. Sepsis secondary to pneumonia/bronchiectasis/COPD flare, was treated with DuoNebs, Symbicort, mucolytic's, cough suppressants and pulmonary did evaluate during this hospital stay.   Was on cefepime which is being discontinued upon discharge and will be placed on doxycycline for 2 more days to complete a course.  Currently requiring only 2 L of oxygen and not in any respiratory distress.  Pulmonary did  evaluate and cleared her to be discharged home and patient does have bronchodilators at home.     2.  Chronic atrial fibrillation, rate is better controlled and is on Eliquis, digoxin, metoprolol.    Cardiology did evaluate during this hospital stay with no further recommendations at this point.     3.  Chronic diastolic heart failure, currently compensated and is on torsemide which is being continued.     4.  Hyperlipidemia, on statins.     5.  Hypokalemia, replaced and potassium is 3.5 today.    Copied text on this note has been reviewed by me on 4/8/2023    Day of Discharge         Physical Exam:  Temp:  [97.4 °F (36.3 °C)-98.4 °F (36.9 °C)] 98.4 °F (36.9 °C)  Heart Rate:  [] 77  Resp:  [18-20] 18  BP: (111-117)/(61-74) 113/71  Body mass index is 21.27 kg/m².  Physical Exam  HEENT: PERRLA, extraocular movements intact, Scleras no icterus  Neck: Supple, no JVD  Cardiovascular: Irregular with normal S1 and S2  Respiratory: Improved air movement with faint wheezes  GI: Soft, nontender, bowel sounds are present  Extremities: No edema, palpable pedal pulses      Consultants     Consult Orders (all) (From admission, onward)     Start     Ordered    04/04/23 1051  Inpatient Pulmonology Consult  Once        Specialty:  Pulmonary Disease  Provider:  Guillermo Pimentel MD    04/04/23 1051    04/04/23 0847  Inpatient Cardiology Consult  Once        Specialty:  Cardiology  Provider:  Cody Woods MD    04/04/23 0846    04/03/23 2034  [facility] (on-call MD unless specified) Details  Once        Specialty:  Hospitalist  Provider:  (Not yet assigned)    04/03/23 2033              Procedures     * Surgery not found *      Imaging Results (All)     Procedure Component Value Units Date/Time    XR Chest PA & Lateral [[id number]] Collected: 04/08/23 1521     Updated: 04/08/23 1527    Narrative:      XR CHEST PA AND LATERAL-     HISTORY: Female who is 89 years-old,  volume overload, short of breath     TECHNIQUE: Frontal and  lateral views of the chest     COMPARISON: 04/06/2023     FINDINGS: Heart is enlarged. Aorta is tortuous, calcified. Pulmonary  vasculature is less congested. Previous pulmonary infiltrates appear  decreased, with small likely scarring or atelectasis at the mid lungs.  No pleural effusion, or pneumothorax. No acute osseous process.       Impression:      Partial improvement.     This report was finalized on 4/8/2023 3:24 PM by Dr. Steven Garza M.D.       XR Chest 1 View [[id number]] Collected: 04/06/23 0729     Updated: 04/06/23 0734    Narrative:      XR CHEST 1 VW-clinical: Follow-up infiltrate     COMPARISON 04/03/2023     FINDINGS: There is cardiac enlargement. No pleural effusion. Ectasia of  the thoracic aorta. Pulmonary infiltrates similar to the previous  examination. Subtle increase in the overall interstitial pattern  suggests mild vascular congestion. No new area of airspace disease has  developed. The remainder is unremarkable.     This report was finalized on 4/6/2023 7:31 AM by Dr. Stan Rodriguez M.D.       XR Chest 1 View [[id number]] Collected: 04/03/23 1932     Updated: 04/03/23 1937    Narrative:      XR CHEST 1 VW-     HISTORY: Female who is 89 years-old,  fever, dyspnea     TECHNIQUE: Frontal view of the chest     COMPARISON: 07/15/2022     FINDINGS: The heart is enlarged. Aorta is calcified. Pulmonary  vasculature is unremarkable. Patchy density at the mid lungs could be  areas of developing infiltrate, follow-up suggested. No pleural  effusion, or pneumothorax. No acute osseous process.       Impression:      Patchy densities at the mid lungs could be areas of  developing infiltrate, follow-up suggested. Cardiomegaly.     This report was finalized on 4/3/2023 7:34 PM by Dr. Steven Garza M.D.           Results for orders placed during the hospital encounter of 04/03/23    Duplex Venous Lower Extremity - Bilateral CAR    Interpretation Summary  •  Chronic right lower extremity deep  vein thrombosis noted in the soleal.  •  All other veins appeared normal bilaterally.    Results for orders placed during the hospital encounter of 07/09/22    Adult Transthoracic Echo Complete W/ Cont if Necessary Per Protocol    Interpretation Summary  · Calculated left ventricular EF = 55.2% Estimated left ventricular EF was in agreement with the calculated left ventricular EF. Left ventricular systolic function is normal.  · Left ventricular wall thickness is consistent with moderate concentric hypertrophy.  · Left atrial volume is moderately increased.  · The right atrial cavity is severely dilated.  · Moderate mitral valve regurgitation is present with an eccentric jet noted.  · Moderate tricuspid valve regurgitation is present.  · Estimated right ventricular systolic pressure from tricuspid regurgitation is normal (<35 mmHg). Calculated right ventricular systolic pressure from tricuspid regurgitation is 31 mmHg.    Pertinent Labs     Results from last 7 days   Lab Units 04/08/23  0841 04/07/23  0615 04/06/23  0616 04/05/23  0619   WBC 10*3/mm3 8.42 8.22 10.88* 9.66   HEMOGLOBIN g/dL 13.0 12.3 11.4* 11.8*   PLATELETS 10*3/mm3 252 204 195 195     Results from last 7 days   Lab Units 04/08/23  0841 04/07/23  0615 04/06/23  0616 04/05/23  0619   SODIUM mmol/L 138 136 136 138   POTASSIUM mmol/L 3.5 3.1* 3.5 4.0   CHLORIDE mmol/L 97* 97* 95* 99   CO2 mmol/L 29.8* 28.4 29.0 27.4   BUN mg/dL 18 21 16 18   CREATININE mg/dL 0.78 0.72 0.89 0.93   GLUCOSE mg/dL 161* 112* 107* 120*   EGFR mL/min/1.73 71.4 78.6 60.9 57.8*     Results from last 7 days   Lab Units 04/03/23  1851   ALBUMIN g/dL 4.2   BILIRUBIN mg/dL 1.1   ALK PHOS U/L 107   AST (SGOT) U/L 22   ALT (SGPT) U/L 16     Results from last 7 days   Lab Units 04/08/23  0841 04/07/23  0615 04/06/23  0616 04/05/23  0619 04/04/23  0900 04/04/23  0215 04/03/23  1851   CALCIUM mg/dL 9.6 9.2 8.8 9.3  --    < > 9.7*   ALBUMIN g/dL  --   --   --   --   --   --  4.2    MAGNESIUM mg/dL  --   --   --   --  2.5*  --   --     < > = values in this interval not displayed.       Results from last 7 days   Lab Units 04/07/23  1428 04/04/23  0215 04/04/23  0015 04/03/23 1851   HSTROP T ng/L  --  25* 26* 30*   PROBNP pg/mL 2,532.0*  --   --  3,211.0*           Invalid input(s): LDLCALC  Results from last 7 days   Lab Units 04/05/23  1321 04/04/23  1011 04/03/23  1856 04/03/23 1851   BLOODCX   --   --  No growth at 4 days No growth at 4 days   RESPCX  Light growth (2+) Normal respiratory fortino. No S. aureus or Pseudomonas aeruginosa detected. Final report.  --   --   --    MRSAPCR   --  No MRSA Detected  --   --      Results from last 7 days   Lab Units 04/03/23 1855   COVID19  Not Detected       Test Results Pending at Discharge     Pending Labs     Order Current Status    Blood Culture - Blood, Arm, Left Preliminary result    Blood Culture - Blood, Arm, Right Preliminary result          Discharge Details        Discharge Medications      New Medications      Instructions Start Date   doxycycline 100 MG capsule  Commonly known as: MONODOX   100 mg, Oral, Every 12 Hours Scheduled         Continue These Medications      Instructions Start Date   acetaminophen 325 MG tablet  Commonly known as: TYLENOL   650 mg, Oral, Every 4 Hours PRN      albuterol sulfate  (90 Base) MCG/ACT inhaler  Commonly known as: PROVENTIL HFA;VENTOLIN HFA;PROAIR HFA   2 puffs, Inhalation, Every 6 Hours PRN      apixaban 2.5 MG tablet tablet  Commonly known as: ELIQUIS   2.5 mg, Oral, Every 12 Hours Scheduled      atorvastatin 40 MG tablet  Commonly known as: LIPITOR   40 mg, Oral, Daily      azithromycin 250 MG tablet  Commonly known as: ZITHROMAX   250 mg, Oral, Daily      calcium carbonate 600 MG tablet  Commonly known as: OS-EVIN   600 mg, Oral, Daily, With vitamin D      cetirizine 10 MG tablet  Commonly known as: zyrTEC   10 mg, Oral, Daily      cholecalciferol 25 MCG (1000 UT) tablet  Commonly known  as: VITAMIN D3   1,000 Units, Oral, Daily      citalopram 10 MG tablet  Commonly known as: CeleXA   10 mg, Oral, Daily      ezetimibe 10 MG tablet  Commonly known as: ZETIA   10 mg, Oral, Daily      FeroSul 325 (65 FE) MG tablet  Generic drug: ferrous sulfate   TAKE ONE TABLET BY MOUTH DAILY WITH BREAKFAST      Florajen Acidophilus capsule   1 tablet, Oral, Daily      fluticasone 50 MCG/ACT nasal spray  Commonly known as: FLONASE   2 sprays, Nasal, Nightly PRN      fluticasone-salmeterol 230-21 MCG/ACT inhaler  Commonly known as: ADVAIR HFA   2 puffs, Inhalation, 2 Times Daily - RT      glucosamine-chondroitin 500-400 MG capsule capsule   1 capsule, Oral, Daily      guaiFENesin 600 MG 12 hr tablet  Commonly known as: MUCINEX   600 mg, Oral, 2 Times Daily      ipratropium-albuterol 0.5-2.5 mg/3 ml nebulizer  Commonly known as: DUO-NEB   3 mL, Nebulization, 2 Times Daily, SOB/WHEEZING      losartan 50 MG tablet  Commonly known as: COZAAR   100 mg, Oral, Daily      metoprolol tartrate 50 MG tablet  Commonly known as: LOPRESSOR   50 mg, Oral, Every 12 Hours Scheduled      multivitamin with minerals tablet tablet   1 tablet, Oral, 2 Times Daily      O2  Commonly known as: OXYGEN   3 L/min, Inhalation, Nightly      potassium chloride 10 MEQ CR capsule  Commonly known as: MICRO-K   10 mEq, Oral, Daily      sodium chloride 0.9 % nebulizer solution   3 mL, Nebulization, 2 Times Daily      torsemide 20 MG tablet  Commonly known as: DEMADEX   20 mg, Oral, 2 Times Daily      VITAMIN B COMPLEX PO   1 tablet, Oral, Daily, TAKES VIT B12 1000MCG DAILY             Allergies   Allergen Reactions   • Amlodipine Besylate Swelling   • Aspirin GI Intolerance     Caused bleeding ulcers   • Bactrim [Sulfamethoxazole-Trimethoprim] Nausea And Vomiting   • Erythromycin Unknown (See Comments)     Patient does not recall type of reaction.   • Levaquin [Levofloxacin] Unknown (See Comments)     Nausea     • Nitrofurantoin Nausea Only and Nausea  And Vomiting   • Ramipril Other (See Comments)     Cough       Discharge Disposition:  Home or Self Care      Discharge Diet:  Diet Order   Procedures   • Diet: Cardiac Diets; Healthy Heart (2-3 Na+); Texture: Regular Texture (IDDSI 7); Fluid Consistency: Thin (IDDSI 0)       Discharge Activity:   Activity Instructions     Activity as Tolerated            CODE STATUS:    Code Status and Medical Interventions:   Ordered at: 04/03/23 8575     Code Status (Patient has no pulse and is not breathing):    CPR (Attempt to Resuscitate)     Medical Interventions (Patient has pulse or is breathing):    Full Support       Future Appointments   Date Time Provider Department Center   4/20/2023 10:40 AM Marcie Robbins MD [facility]     Additional Instructions for the Follow-ups that You Need to Schedule     Discharge Follow-up with PCP   As directed       Currently Documented PCP:    Matt Rose MD    PCP Phone Number:    [phone]     Follow Up Details: 1 week         Discharge Follow-up with Specified Provider: ; 2 Weeks   As directed      To:     Follow Up: 2 Weeks            Follow-up Information     Rogelio Tucker MD Follow up in 1 month(s).    Specialties: Sleep Medicine, Pulmonary Disease  Why: Within 1 to 2 months  Contact information:  [address]  [place] [id number] [phone]             Matt Rose MD .    Specialty: Family Medicine  Why: 1 week  Contact information:  [address]  [place] [id number] [phone]                         Additional Instructions for the Follow-ups that You Need to Schedule     Discharge Follow-up with PCP   As directed       Currently Documented PCP:    Matt Rose MD    PCP Phone Number:    [phone]     Follow Up Details: 1 week         Discharge Follow-up with Specified Provider: ; 2 Weeks   As directed      To:     Follow Up: 2 Weeks           Time Spent on Discharge:  Greater than 30  minutes      Bob Pretty MD  [facility]  04/08/23  15:56 EDT

## 2023-04-08 NOTE — PROGRESS NOTES
Dr. DANIELLA Jackson    [address]        Patient ID:  Name:  Agnieszka Rizzo  MRN:  [id number]  8/7/1930  89 y.o.  female            CC/Reason for visit: Bronchiectasis exacerbation     Interval hx:  Feels much better.  Her cough is still present but has improved significantly.  She is requesting to go home.  She is on 2 L nasal cannula.  She has home oxygen.  She has a home nebulizer.  So far all cultures are negative     ROS: Denies fever, chills or hemoptysis    Vitals:  Vitals:    04/08/23 0007 04/08/23 0700 04/08/23 0732 04/08/23 0733   BP: 111/61 117/64     BP Location: Right arm Right arm     Patient Position: Lying Lying     Pulse:  99 (!) 134 103   Resp: 18 18 20    Temp: 97.4 °F (36.3 °C) 97.4 °F (36.3 °C)     TempSrc: Oral Oral     SpO2:  95% 98%    Weight:       Height:               Body mass index is 21.27 kg/m².    Intake/Output Summary (Last 24 hours) at 4/8/2023 1313  Last data filed at 4/8/2023 0900  Gross per 24 hour   Intake 300 ml   Output --   Net 300 ml       Exam:  GEN:  No distress  Alert, oriented x 3.   LUNGS: Few coarse breath sounds but no wheezing bilat, no use of accessory muscles  CV:  Normal S1S2, without murmur, no edema  ABD:  Non tender, no enlarged liver or masses      Scheduled meds:  apixaban, 2.5 mg, Oral, Q12H  atorvastatin, 40 mg, Oral, Nightly  budesonide-formoterol, 2 puff, Inhalation, BID - RT  cholecalciferol, 1,000 Units, Oral, Daily  citalopram, 10 mg, Oral, Daily  ferrous sulfate, 325 mg, Oral, Every Other Day  guaiFENesin, 600 mg, Oral, BID  ipratropium-albuterol, 3 mL, Nebulization, 4x Daily - RT  lactobacillus acidophilus, 1 capsule, Oral, Daily  metoprolol tartrate, 50 mg, Oral, Q12H  potassium chloride, 10 mEq, Oral, Daily  senna-docusate sodium, 2 tablet, Oral, BID  sodium chloride, 3 mL, Nebulization, BID  torsemide, 20 mg, Oral, BID      IV meds:                           Data Review:   I reviewed the patient's medications and new clinical  results.            Results from last 7 days   Lab Units 04/08/23  0841 04/07/23  1428 04/07/23  0615 04/06/23  0616 04/04/23  0215 04/03/23  1851   SODIUM mmol/L 138  --  136 136   < > 135*   POTASSIUM mmol/L 3.5  --  3.1* 3.5   < > 3.3*   CHLORIDE mmol/L 97*  --  97* 95*   < > 93*   CO2 mmol/L 29.8*  --  28.4 29.0   < > 30.2*   BUN mg/dL 18  --  21 16   < > 21   CREATININE mg/dL 0.78  --  0.72 0.89   < > 1.26*   CALCIUM mg/dL 9.6  --  9.2 8.8   < > 9.7*   BILIRUBIN mg/dL  --   --   --   --   --  1.1   ALK PHOS U/L  --   --   --   --   --  107   ALT (SGPT) U/L  --   --   --   --   --  16   AST (SGOT) U/L  --   --   --   --   --  22   GLUCOSE mg/dL 161*  --  112* 107*   < > 140*   WBC 10*3/mm3 8.42  --  8.22 10.88*   < > 9.54   HEMOGLOBIN g/dL 13.0  --  12.3 11.4*   < > 16.8*   PLATELETS 10*3/mm3 252  --  204 195   < > 191   PROBNP pg/mL  --  2,532.0*  --   --   --  3,211.0*   PROCALCITONIN ng/mL 0.12  --  0.15  --   --  0.19    < > = values in this interval not displayed.     Results from last 7 days   Lab Units 04/05/23  1321 04/03/23  1856 04/03/23  1851   BLOODCX   --  No growth at 4 days No growth at 4 days   RESPCX  Light growth (2+) Normal respiratory fortino. No S. aureus or Pseudomonas aeruginosa detected. Final report.  --   --              ASSESSMENT:   Sepsis due to pneumonia  Pseudomonas colonization of the airways    Acute on chronic respiratory failure with hypoxia    Chronic diastolic CHF (congestive heart failure) (formerly [facility])    Hypokalemia    Bronchiectasis With exacerbation    COPD (chronic obstructive pulmonary disease)      PLAN:  Patient has improved.  Oxygenation has improved.  Procalcitonin remains normal twice.  Chest x-ray on admission did show some linear opacities suggestive of fluid overload she had elevated proBNP.  I will repeat a chest x-ray today, PA and lateral  I will start her on doxycycline orally now, to complete 2 more days for total of 5 days of pneumonia treatment.  She was started  on antibiotics on her day of admission April 4th.  She has nebulizer at home.  She has oxygen equipment at home.  She has bronchodilators at home.  From cardiology standpoint seems like her atrial fibrillation is controlled.  No further acute cardiac issues to be addressed during this hospital stay.  I will notify hospitalist that she is good to go home today  Follow-up with Dr. Tucker in our office within 1 to 2-month.  All of this was discussed with the patient and daughter today.      Pravin Jackson MD  4/8/2023

## 2023-04-08 NOTE — PLAN OF CARE
Goal Outcome Evaluation:  Plan of Care Reviewed With: patient        Progress: improving  Outcome Evaluation: VSS. Appetite good. No complaints of pain. Skin intact. Up to bathroom with SBA. Currently on 1L, has O2 at home. Antibiotics changed to PO. Discharged to home with family. Discharge instructions and education sheets provided.

## 2023-04-09 ENCOUNTER — HOME HEALTH ADMISSION (OUTPATIENT)
Dept: HOME HEALTH SERVICES | Facility: HOME HEALTHCARE | Age: 88
End: 2023-04-09
Payer: MEDICARE

## 2023-04-09 ENCOUNTER — READMISSION MANAGEMENT (OUTPATIENT)
Dept: CALL CENTER | Facility: HOSPITAL | Age: 88
End: 2023-04-09
Payer: MEDICARE

## 2023-04-09 VITALS
HEART RATE: 86 BPM | DIASTOLIC BLOOD PRESSURE: 70 MMHG | OXYGEN SATURATION: 92 % | HEIGHT: 63 IN | BODY MASS INDEX: 21.28 KG/M2 | WEIGHT: 120.1 LBS | TEMPERATURE: 97.7 F | RESPIRATION RATE: 18 BRPM | SYSTOLIC BLOOD PRESSURE: 123 MMHG

## 2023-04-09 LAB
ANION GAP SERPL CALCULATED.3IONS-SCNC: 10 MMOL/L (ref 5–15)
BASOPHILS # BLD AUTO: 0.06 10*3/MM3 (ref 0–0.2)
BASOPHILS NFR BLD AUTO: 0.8 % (ref 0–1.5)
BUN SERPL-MCNC: 19 MG/DL (ref 8–23)
BUN/CREAT SERPL: 23.8 (ref 7–25)
CALCIUM SPEC-SCNC: 9 MG/DL (ref 8.2–9.6)
CHLORIDE SERPL-SCNC: 100 MMOL/L (ref 98–107)
CO2 SERPL-SCNC: 29 MMOL/L (ref 22–29)
CREAT SERPL-MCNC: 0.8 MG/DL (ref 0.57–1)
DEPRECATED RDW RBC AUTO: 47.4 FL (ref 37–54)
EGFRCR SERPLBLD CKD-EPI 2021: 69.2 ML/MIN/1.73
EOSINOPHIL # BLD AUTO: 0.23 10*3/MM3 (ref 0–0.4)
EOSINOPHIL NFR BLD AUTO: 2.9 % (ref 0.3–6.2)
ERYTHROCYTE [DISTWIDTH] IN BLOOD BY AUTOMATED COUNT: 13.4 % (ref 12.3–15.4)
GLUCOSE SERPL-MCNC: 104 MG/DL (ref 65–99)
HCT VFR BLD AUTO: 32.3 % (ref 34–46.6)
HGB BLD-MCNC: 10.8 G/DL (ref 12–15.9)
IMM GRANULOCYTES # BLD AUTO: 0.08 10*3/MM3 (ref 0–0.05)
IMM GRANULOCYTES NFR BLD AUTO: 1 % (ref 0–0.5)
LYMPHOCYTES # BLD AUTO: 2.45 10*3/MM3 (ref 0.7–3.1)
LYMPHOCYTES NFR BLD AUTO: 30.8 % (ref 19.6–45.3)
MCH RBC QN AUTO: 32 PG (ref 26.6–33)
MCHC RBC AUTO-ENTMCNC: 33.4 G/DL (ref 31.5–35.7)
MCV RBC AUTO: 95.6 FL (ref 79–97)
MONOCYTES # BLD AUTO: 0.79 10*3/MM3 (ref 0.1–0.9)
MONOCYTES NFR BLD AUTO: 9.9 % (ref 5–12)
NEUTROPHILS NFR BLD AUTO: 4.35 10*3/MM3 (ref 1.7–7)
NEUTROPHILS NFR BLD AUTO: 54.6 % (ref 42.7–76)
NRBC BLD AUTO-RTO: 0.1 /100 WBC (ref 0–0.2)
PLATELET # BLD AUTO: 266 10*3/MM3 (ref 140–450)
PMV BLD AUTO: 11 FL (ref 6–12)
POTASSIUM SERPL-SCNC: 3.4 MMOL/L (ref 3.5–5.2)
RBC # BLD AUTO: 3.38 10*6/MM3 (ref 3.77–5.28)
SODIUM SERPL-SCNC: 139 MMOL/L (ref 136–145)
WBC NRBC COR # BLD: 7.96 10*3/MM3 (ref 3.4–10.8)

## 2023-04-09 PROCEDURE — 94664 DEMO&/EVAL PT USE INHALER: CPT

## 2023-04-09 PROCEDURE — 80048 BASIC METABOLIC PNL TOTAL CA: CPT | Performed by: INTERNAL MEDICINE

## 2023-04-09 PROCEDURE — 94799 UNLISTED PULMONARY SVC/PX: CPT

## 2023-04-09 PROCEDURE — 94761 N-INVAS EAR/PLS OXIMETRY MLT: CPT

## 2023-04-09 PROCEDURE — 85025 COMPLETE CBC W/AUTO DIFF WBC: CPT | Performed by: INTERNAL MEDICINE

## 2023-04-09 RX ADMIN — TORSEMIDE 20 MG: 20 TABLET ORAL at 08:07

## 2023-04-09 RX ADMIN — BUDESONIDE AND FORMOTEROL FUMARATE DIHYDRATE 2 PUFF: 160; 4.5 AEROSOL RESPIRATORY (INHALATION) at 07:38

## 2023-04-09 RX ADMIN — POTASSIUM CHLORIDE 10 MEQ: 750 TABLET, EXTENDED RELEASE ORAL at 08:07

## 2023-04-09 RX ADMIN — GUAIFENESIN 600 MG: 600 TABLET, EXTENDED RELEASE ORAL at 08:07

## 2023-04-09 RX ADMIN — APIXABAN 2.5 MG: 2.5 TABLET, FILM COATED ORAL at 08:06

## 2023-04-09 RX ADMIN — METOPROLOL TARTRATE 50 MG: 50 TABLET, FILM COATED ORAL at 08:06

## 2023-04-09 RX ADMIN — Medication 1 CAPSULE: at 08:07

## 2023-04-09 RX ADMIN — DOXYCYCLINE 100 MG: 100 CAPSULE ORAL at 08:07

## 2023-04-09 RX ADMIN — CITALOPRAM 10 MG: 10 TABLET, FILM COATED ORAL at 08:07

## 2023-04-09 RX ADMIN — Medication 1000 UNITS: at 08:06

## 2023-04-09 NOTE — PLAN OF CARE
Goal Outcome Evaluation:  Plan of Care Reviewed With: patient        Progress: improving  Outcome Evaluation: SBA to bathroom. VSS. Appetite good. Skin intact. Remains in controlled a-fib. Discharged on Po antibiotics. Discharge instruction and education sheets provided. Discharged to home with family.

## 2023-04-09 NOTE — DISCHARGE PLACEMENT REQUEST
"Svetlana Rizzo (89 y.o. Female)     Date of Birth   08/07/1930    Social Security Number       Address   [address]  [place] [id number]    Home Phone   [phone]    MRN   [id number]       Catholic   Christianity    Marital Status                               Admission Date   4/3/23    Admission Type   Emergency    Admitting Provider   Bob Pretty MD    Attending Provider   Bob Pretty MD    Department, Room/Bed   [address], S[address]/1       Discharge Date       Discharge Disposition   Home or Self Care    Discharge Destination                               Attending Provider: Bob Pretty MD    Allergies: Amlodipine Besylate, Aspirin, Bactrim [Sulfamethoxazole-trimethoprim], Erythromycin, Levaquin [Levofloxacin], Nitrofurantoin, Ramipril    Isolation: None   Infection: None   Code Status: CPR    Ht: 160 cm (63\")   Wt: 54.5 kg (120 lb 1.6 oz)    Admission Cmt: None   Principal Problem: Sepsis due to pneumonia [J18.9,A41.9]                 Active Insurance as of 4/3/2023     Primary Coverage     Payor Plan Insurance Group Employer/Plan Group    MEDICARE MEDICARE A & B      Payor Plan Address Payor Plan Phone Number Payor Plan Fax Number Effective Dates    [address] [phone]  8/1/1995 - None Entered    [facility] [id number]       Subscriber Name Subscriber Birth Date Member ID       SVETLANA RIZZO 8/7/1930 [id number]           Secondary Coverage     Payor Plan Insurance Group Employer/Plan Group    AARP MC SUP AARP HEALTH CARE OPTIONS PLAN F     Payor Plan Address Payor Plan Phone Number Payor Plan Fax Number Effective Dates    [facility] [phone]  7/1/2014 - None Entered    [address]       [facility] [address]       Subscriber Name Subscriber Birth Date Member ID       SVETLANA RIZZO 8/7/1930 [id number]                 Emergency Contacts      (Rel.) Home Phone Work Phone Mobile Phone    Michelle Zaldivar " (Daughter) [phone] -- [phone]    Handy Rizzo (Son) [phone] -- --    Isaias Zaldivar (son n [facility]) (Relative) [phone] -- --

## 2023-04-09 NOTE — PROGRESS NOTES
[facility] will follow post hospital. Patient/daughter agreeable to service. Contact information and PCP confirmed. Patient is not currently receiving any other home health care.

## 2023-04-09 NOTE — PLAN OF CARE
Goal Outcome Evaluation:  Plan of Care Reviewed With: patient      Progress: improving  Outcome Evaluation: All needs met. Up w/ stand-by assist and walker to toilet. Cardiac diet. VSS. Oriented x 4. Controlled a-fib on the monitor. 3L NC for sleep and this is baseline at home. PO antibiotics. No complaints. Plans to d/c home in the morning.

## 2023-04-09 NOTE — OUTREACH NOTE
Prep Survey    Flowsheet Row Responses   [facility] patient discharged from? [place]   Is LACE score < 7 ? No   Eligibility [facility]   Date of Admission 04/03/23   Date of Discharge 04/09/23   Discharge Disposition Home-Health Care Sv   Discharge diagnosis Sepsis due to pneumonia,  CHF   Does the patient have one of the following disease processes/diagnoses(primary or secondary)? Sepsis   Does the patient have Home health ordered? Yes   What is the Home health agency?   [facility]    Is there a DME ordered? Yes   What DME was ordered? [facility]    Prep survey completed? Yes          Eva RAYMUNDO - Registered Nurse

## 2023-04-09 NOTE — DISCHARGE SUMMARY
Patient Name: Agnieszka Rizzo  : 1930  MRN: [id number]    Date of Admission: 4/3/2023  Date of Discharge:  2023  Primary Care Physician: Matt Rose MD      Chief Complaint:   Cough, Fever, and Shortness of Breath      Discharge Diagnoses     Active Hospital Problems    Diagnosis  POA   • **Sepsis due to pneumonia [J18.9, A41.9]  Yes   • COPD (chronic obstructive pulmonary disease) [J44.9]  Yes   • Bronchiectasis [J47.9]  Yes   • Hypokalemia [E87.6]  Yes   • Chronic diastolic CHF (congestive heart failure) (HCC) [I50.32]  Yes   • Acute on chronic respiratory failure with hypoxia [J96.21]  Yes   • Primary hypertension [I10]  Yes      Resolved Hospital Problems   No resolved problems to display.        Hospital Course     Ms. Rizzo is a 89 y.o. female with a history of afib, CHF, CAD, COPD/bronchiectasis, nocturnal hypoxia, HTN, HLD that presents to [facility] complaining of cough, soa, fever. She has felt ill for nearly a week. She has an oximeter at home and has been noticing low O2 sats during the day. She uses O2 w/sleep at baseline. Cough has been mostly nonproductive. Denies chest pain, palpitations, n/v/d, dysuria. Self covid test yesterday reported as negative.     TMax 102.8. HR up to 140 in ED, most recent 108. On room air. WBC 9.54, Hgb 16.8. Procal 0.19. BC collected. BNP 3211. HS trop 30-->26-->25. RVP neg. Lactate 1.1. Gluc 140, Cr 1.26, Na 135, K 3.3, Cl 93, CO2 30.2, ca 9.7. UA 1+ blood. CXR: patch densities mid lungs could be developing infiltrates; cardiomegaly.     1. Sepsis secondary to pneumonia/bronchiectasis/COPD flare, was treated with DuoNebs, Symbicort, mucolytic's, cough suppressants and pulmonary did evaluate during this hospital stay.   Was on cefepime which is being discontinued upon discharge and will be placed on doxycycline for 2 more days to complete a course.  Currently requiring only 2 L of oxygen and not in any respiratory distress.  Pulmonary  did evaluate and cleared her to be discharged home and patient does have bronchodilators at home.     2.  Chronic atrial fibrillation, rate is better controlled and is on Eliquis, digoxin, metoprolol.    Cardiology did evaluate during this hospital stay with no further recommendations at this point.     3.  Chronic diastolic heart failure, currently compensated and is on torsemide which is being continued.     4.  Hyperlipidemia, on statins.     5.  Hypokalemia,  potassium is 3.4 and is being replaced prior to discharge.     Copied text on this note has been reviewed by me on 4/9/2023    Day of Discharge         Physical Exam:  Temp:  [97.5 °F (36.4 °C)-98.4 °F (36.9 °C)] 97.7 °F (36.5 °C)  Heart Rate:  [74-88] 86  Resp:  [18] 18  BP: (100-138)/(58-80) 123/70  Body mass index is 21.27 kg/m².  Physical Exam  HEENT: PERRLA, extraocular movements intact, Scleras no icterus  Neck: Supple, no JVD  Cardiovascular: Irregular with normal S1 and S2  Respiratory: Improved air movement with faint wheezes  GI: Soft, nontender, bowel sounds are present  Extremities: No edema, palpable pedal pulses      Consultants     Consult Orders (all) (From admission, onward)     Start     Ordered    04/04/23 1051  Inpatient Pulmonology Consult  Once        Specialty:  Pulmonary Disease  Provider:  Guillermo Pimentel MD    04/04/23 1051    04/04/23 0847  Inpatient Cardiology Consult  Once        Specialty:  Cardiology  Provider:  Cody Woods MD    04/04/23 0846    04/03/23 2034  [facility] (on-call MD unless specified) Details  Once        Specialty:  Hospitalist  Provider:  (Not yet assigned)    04/03/23 2033              Procedures     * Surgery not found *      Imaging Results (All)     Procedure Component Value Units Date/Time    XR Chest PA & Lateral [[id number]] Collected: 04/08/23 1521     Updated: 04/08/23 1527    Narrative:      XR CHEST PA AND LATERAL-     HISTORY: Female who is 89 years-old,  volume overload, short of breath      TECHNIQUE: Frontal and lateral views of the chest     COMPARISON: 04/06/2023     FINDINGS: Heart is enlarged. Aorta is tortuous, calcified. Pulmonary  vasculature is less congested. Previous pulmonary infiltrates appear  decreased, with small likely scarring or atelectasis at the mid lungs.  No pleural effusion, or pneumothorax. No acute osseous process.       Impression:      Partial improvement.     This report was finalized on 4/8/2023 3:24 PM by Dr. Steven Garza M.D.       XR Chest 1 View [[id number]] Collected: 04/06/23 0729     Updated: 04/06/23 0734    Narrative:      XR CHEST 1 VW-clinical: Follow-up infiltrate     COMPARISON 04/03/2023     FINDINGS: There is cardiac enlargement. No pleural effusion. Ectasia of  the thoracic aorta. Pulmonary infiltrates similar to the previous  examination. Subtle increase in the overall interstitial pattern  suggests mild vascular congestion. No new area of airspace disease has  developed. The remainder is unremarkable.     This report was finalized on 4/6/2023 7:31 AM by Dr. Stan Rodriguez M.D.       XR Chest 1 View [[id number]] Collected: 04/03/23 1932     Updated: 04/03/23 1937    Narrative:      XR CHEST 1 VW-     HISTORY: Female who is 89 years-old,  fever, dyspnea     TECHNIQUE: Frontal view of the chest     COMPARISON: 07/15/2022     FINDINGS: The heart is enlarged. Aorta is calcified. Pulmonary  vasculature is unremarkable. Patchy density at the mid lungs could be  areas of developing infiltrate, follow-up suggested. No pleural  effusion, or pneumothorax. No acute osseous process.       Impression:      Patchy densities at the mid lungs could be areas of  developing infiltrate, follow-up suggested. Cardiomegaly.     This report was finalized on 4/3/2023 7:34 PM by Dr. Steven Garza M.D.           Results for orders placed during the hospital encounter of 04/03/23    Duplex Venous Lower Extremity - Bilateral CAR    Interpretation Summary  •  Chronic  right lower extremity deep vein thrombosis noted in the soleal.  •  All other veins appeared normal bilaterally.    Results for orders placed during the hospital encounter of 07/09/22    Adult Transthoracic Echo Complete W/ Cont if Necessary Per Protocol    Interpretation Summary  · Calculated left ventricular EF = 55.2% Estimated left ventricular EF was in agreement with the calculated left ventricular EF. Left ventricular systolic function is normal.  · Left ventricular wall thickness is consistent with moderate concentric hypertrophy.  · Left atrial volume is moderately increased.  · The right atrial cavity is severely dilated.  · Moderate mitral valve regurgitation is present with an eccentric jet noted.  · Moderate tricuspid valve regurgitation is present.  · Estimated right ventricular systolic pressure from tricuspid regurgitation is normal (<35 mmHg). Calculated right ventricular systolic pressure from tricuspid regurgitation is 31 mmHg.    Pertinent Labs     Results from last 7 days   Lab Units 04/09/23 0423 04/08/23  0841 04/07/23  0615 04/06/23  0616   WBC 10*3/mm3 7.96 8.42 8.22 10.88*   HEMOGLOBIN g/dL 10.8* 13.0 12.3 11.4*   PLATELETS 10*3/mm3 266 252 204 195     Results from last 7 days   Lab Units 04/09/23  0423 04/08/23  0841 04/07/23  0615 04/06/23  0616   SODIUM mmol/L 139 138 136 136   POTASSIUM mmol/L 3.4* 3.5 3.1* 3.5   CHLORIDE mmol/L 100 97* 97* 95*   CO2 mmol/L 29.0 29.8* 28.4 29.0   BUN mg/dL 19 18 21 16   CREATININE mg/dL 0.80 0.78 0.72 0.89   GLUCOSE mg/dL 104* 161* 112* 107*   EGFR mL/min/1.73 69.2 71.4 78.6 60.9     Results from last 7 days   Lab Units 04/03/23  1851   ALBUMIN g/dL 4.2   BILIRUBIN mg/dL 1.1   ALK PHOS U/L 107   AST (SGOT) U/L 22   ALT (SGPT) U/L 16     Results from last 7 days   Lab Units 04/09/23  0423 04/08/23  0841 04/07/23  0615 04/06/23  0616 04/05/23  0619 04/04/23  0900 04/04/23  0215 04/03/23  1851   CALCIUM mg/dL 9.0 9.6 9.2 8.8   < >  --    < > 9.7*   ALBUMIN  g/dL  --   --   --   --   --   --   --  4.2   MAGNESIUM mg/dL  --   --   --   --   --  2.5*  --   --     < > = values in this interval not displayed.       Results from last 7 days   Lab Units 04/07/23  1428 04/04/23  0215 04/04/23  0015 04/03/23 1851   HSTROP T ng/L  --  25* 26* 30*   PROBNP pg/mL 2,532.0*  --   --  3,211.0*           Invalid input(s): LDLCALC  Results from last 7 days   Lab Units 04/05/23  1321 04/04/23  1011 04/03/23  1856 04/03/23 1851   BLOODCX   --   --  No growth at 5 days No growth at 5 days   RESPCX  Light growth (2+) Normal respiratory fortino. No S. aureus or Pseudomonas aeruginosa detected. Final report.  --   --   --    MRSAPCR   --  No MRSA Detected  --   --      Results from last 7 days   Lab Units 04/03/23 1855   COVID19  Not Detected       Test Results Pending at Discharge       Discharge Details        Discharge Medications      New Medications      Instructions Start Date   doxycycline 100 MG capsule  Commonly known as: MONODOX   100 mg, Oral, Every 12 Hours Scheduled         Continue These Medications      Instructions Start Date   acetaminophen 325 MG tablet  Commonly known as: TYLENOL   650 mg, Oral, Every 4 Hours PRN      albuterol sulfate  (90 Base) MCG/ACT inhaler  Commonly known as: PROVENTIL HFA;VENTOLIN HFA;PROAIR HFA   2 puffs, Inhalation, Every 6 Hours PRN      apixaban 2.5 MG tablet tablet  Commonly known as: ELIQUIS   2.5 mg, Oral, Every 12 Hours Scheduled      atorvastatin 40 MG tablet  Commonly known as: LIPITOR   40 mg, Oral, Daily      azithromycin 250 MG tablet  Commonly known as: ZITHROMAX   250 mg, Oral, Daily      calcium carbonate 600 MG tablet  Commonly known as: OS-EVIN   600 mg, Oral, Daily, With vitamin D      cetirizine 10 MG tablet  Commonly known as: zyrTEC   10 mg, Oral, Daily      cholecalciferol 25 MCG (1000 UT) tablet  Commonly known as: VITAMIN D3   1,000 Units, Oral, Daily      citalopram 10 MG tablet  Commonly known as: CeleXA   10 mg,  Oral, Daily      ezetimibe 10 MG tablet  Commonly known as: ZETIA   10 mg, Oral, Daily      FeroSul 325 (65 FE) MG tablet  Generic drug: ferrous sulfate   TAKE ONE TABLET BY MOUTH DAILY WITH BREAKFAST      Florajen Acidophilus capsule   1 tablet, Oral, Daily      fluticasone 50 MCG/ACT nasal spray  Commonly known as: FLONASE   2 sprays, Nasal, Nightly PRN      fluticasone-salmeterol 230-21 MCG/ACT inhaler  Commonly known as: ADVAIR HFA   2 puffs, Inhalation, 2 Times Daily - RT      glucosamine-chondroitin 500-400 MG capsule capsule   1 capsule, Oral, Daily      guaiFENesin 600 MG 12 hr tablet  Commonly known as: MUCINEX   600 mg, Oral, 2 Times Daily      ipratropium-albuterol 0.5-2.5 mg/3 ml nebulizer  Commonly known as: DUO-NEB   3 mL, Nebulization, 2 Times Daily, SOB/WHEEZING      losartan 50 MG tablet  Commonly known as: COZAAR   100 mg, Oral, Daily      metoprolol tartrate 50 MG tablet  Commonly known as: LOPRESSOR   50 mg, Oral, Every 12 Hours Scheduled      multivitamin with minerals tablet tablet   1 tablet, Oral, 2 Times Daily      O2  Commonly known as: OXYGEN   3 L/min, Inhalation, Nightly      potassium chloride 10 MEQ CR capsule  Commonly known as: MICRO-K   10 mEq, Oral, Daily      sodium chloride 0.9 % nebulizer solution   3 mL, Nebulization, 2 Times Daily      torsemide 20 MG tablet  Commonly known as: DEMADEX   20 mg, Oral, 2 Times Daily      VITAMIN B COMPLEX PO   1 tablet, Oral, Daily, TAKES VIT B12 1000MCG DAILY             Allergies   Allergen Reactions   • Amlodipine Besylate Swelling   • Aspirin GI Intolerance     Caused bleeding ulcers   • Bactrim [Sulfamethoxazole-Trimethoprim] Nausea And Vomiting   • Erythromycin Unknown (See Comments)     Patient does not recall type of reaction.   • Levaquin [Levofloxacin] Unknown (See Comments)     Nausea     • Nitrofurantoin Nausea Only and Nausea And Vomiting   • Ramipril Other (See Comments)     Cough       Discharge Disposition:  Home or Self  Care      Discharge Diet:  Diet Order   Procedures   • Diet: Cardiac Diets; Healthy Heart (2-3 Na+); Texture: Regular Texture (IDDSI 7); Fluid Consistency: Thin (IDDSI 0)       Discharge Activity:   Activity Instructions     Activity as Tolerated            CODE STATUS:    Code Status and Medical Interventions:   Ordered at: 04/03/23 2341     Code Status (Patient has no pulse and is not breathing):    CPR (Attempt to Resuscitate)     Medical Interventions (Patient has pulse or is breathing):    Full Support       Future Appointments   Date Time Provider Department Center   4/20/2023 10:40 AM Marcie Robbins MD [facility]     Additional Instructions for the Follow-ups that You Need to Schedule     Discharge Follow-up with PCP   As directed       Currently Documented PCP:    Matt Rose MD    PCP Phone Number:    [phone]     Follow Up Details: 1 week         Discharge Follow-up with Specified Provider: ; 2 Weeks   As directed      To:     Follow Up: 2 Weeks            Follow-up Information     Rogelio Tucker MD Follow up in 1 month(s).    Specialties: Sleep Medicine, Pulmonary Disease  Why: Within 1 to 2 months  Contact information:  [address]  [facility] [id number] [phone]             Matt Rose MD .    Specialty: Family Medicine  Why: 1 week  Contact information:  [address] [address]  [place] [id number] [phone]                         Additional Instructions for the Follow-ups that You Need to Schedule     Discharge Follow-up with PCP   As directed       Currently Documented PCP:    Matt Rose MD    PCP Phone Number:    [phone]     Follow Up Details: 1 week         Discharge Follow-up with Specified Provider: ; 2 Weeks   As directed      To:     Follow Up: 2 Weeks           Time Spent on Discharge:  Greater than 30 minutes      Bob Pretty MD  [facility]  04/09/23  11:47 EDT

## 2023-04-10 ENCOUNTER — HOME CARE VISIT (OUTPATIENT)
Dept: HOME HEALTH SERVICES | Facility: HOME HEALTHCARE | Age: 88
End: 2023-04-10
Payer: MEDICARE

## 2023-04-10 ENCOUNTER — TELEPHONE (OUTPATIENT)
Dept: INTERNAL MEDICINE | Facility: CLINIC | Age: 88
End: 2023-04-10

## 2023-04-10 ENCOUNTER — TRANSITIONAL CARE MANAGEMENT TELEPHONE ENCOUNTER (OUTPATIENT)
Dept: CALL CENTER | Facility: HOSPITAL | Age: 88
End: 2023-04-10
Payer: MEDICARE

## 2023-04-10 VITALS
DIASTOLIC BLOOD PRESSURE: 62 MMHG | TEMPERATURE: 97.9 F | RESPIRATION RATE: 18 BRPM | OXYGEN SATURATION: 100 % | SYSTOLIC BLOOD PRESSURE: 108 MMHG | HEART RATE: 74 BPM

## 2023-04-10 PROCEDURE — G0299 HHS/HOSPICE OF RN EA 15 MIN: HCPCS

## 2023-04-10 NOTE — CASE MANAGEMENT/SOCIAL WORK
Case Management Discharge Note      Final Note: DC'd home 4/9 with [facility] following and family support             Home Medical Care Coordination complete.    Service Provider Selected Services Address Phone Fax Patient Preferred    [facility] [address] [address] [phone] --               Transportation Services  Private: Car    Final Discharge Disposition Code: 06 - home with home health care

## 2023-04-10 NOTE — HOME HEALTH
Patient is a 89F with history of afib, CHF, CAD, COPD, nocturnal hypoxia, HTN and HLD that presents to hospital with cough, SOA and fever.   Patient developed sepsis secondary to pneumonia, will be finishing abx orally at home.  Continue rate control and anticoags for afib and diuretic for CHF.  Was already on o2 at home prior to this admission.  Focus of care related to pneumonia including disease education and CP assessments.  Meds in home except losartan, letting PCP know, follow up at next visit.  CP assessment WNL.

## 2023-04-10 NOTE — OUTREACH NOTE
Call Center TCM Note    Flowsheet Row Responses   [facility] patient discharged from? [place]   Does the patient have one of the following disease processes/diagnoses(primary or secondary)? Sepsis   TCM attempt successful? Yes   Call start time 1104   Call end time 1109   Discharge diagnosis Sepsis due to pneumonia,  CHF   Person spoke with today (if not patient) and relationship Patient   Meds reviewed with patient/caregiver? Yes   Does the patient have all medications related to this admission filled (includes all antibiotics, inhalers, nebulizers,steroids,etc.) Yes   Prescription comments no concerns or questions.   Is the patient taking all medications as directed (includes completed medication regime)? Yes   Comments Scheduled hospital fu on 04/14@ 11a   Does the patient have an appointment with their PCP within 7 days of discharge? No   Nursing Interventions Assisted patient with making appointment per protocol   Has home health visited the patient within 72 hours of discharge? Yes   Psychosocial issues? No   Did the patient receive a copy of their discharge instructions? Yes   Nursing interventions Reviewed instructions with patient   What is the patient's perception of their health status since discharge? Improving   Nursing interventions Nurse provided patient education   Is the patient/caregiver able to teach back TIME? T emperature - higher or lower than normal, I nfection - may have signs and symptoms of an infection, E xtremely Ill - severe pain, discomfort, shortness of breath, M ental Decline - confused, sleepy, difficult to arouse   Nursing interventions Nurse provided patient education   Is patient/caregiver able to teach back steps to recovery at home? Set small, achievable goals for return to baseline health, Rest and regain strength   Is the patient/caregiver able to teach back signs and symptoms of worsening condition: Fever, Rapid heart rate (>90), Shortness of breath/rapid respiratory  rate, Altered mental status(confusion/coma)   Is the patient/caregiver able to teach back the hierarchy of who to call/visit for symptoms/problems? PCP, Specialist, Home health nurse, Urgent Care, ED, 911 Yes   TCM call completed? Yes   Wrap up additional comments Patient states she is doing well this morning. No worsening s/s of infection noted- however patient is aware what to monitor for. No concerns or questions with medications. Preston from  has reached out to patient and schedule a meeting time. No other concerns or questions noted.   Call end time 1109   Would this patient benefit from a Referral to [facility] Social Work? No   Is the patient interested in additional calls from an ambulatory ?  NOTE:  applies to high risk patients requiring additional follow-up. No          Beverley Lees RN    4/10/2023, 11:09 EDT

## 2023-04-10 NOTE — TELEPHONE ENCOUNTER
Caller: FLORENCIO    Relationship: [facility]    Best call back number: [phone]    What orders are you requesting (i.e. lab or imaging): HOME NURSING    In what timeframe would the patient need to come in: TWICE A WEEK FOR 1 WEEK, THEN ONCE A WEEK FOR 2 WEEKS    Where will you receive your lab/imaging services: IN HOME    Additional notes: REQUESTS A CALL BACK WITH VERBAL ORDERS

## 2023-04-10 NOTE — TELEPHONE ENCOUNTER
Caller: FLORENCIO    Relationship: [facility]    Best call back number: [phone]    Requested Prescriptions:   Requested Prescriptions     Pending Prescriptions Disp Refills   • losartan (COZAAR) 50 MG tablet       Sig: Take 2 tablets by mouth Daily.        Pharmacy where request should be sent: [id number] [place], [place] - [address] AT [facility] [phone] [facility] [phone]      Last office visit with prescribing clinician: 9/9/2022   Last telemedicine visit with prescribing clinician: 4/14/2023   Next office visit with prescribing clinician: 4/14/2023     Additional details provided by patient: FLORENCIO STATES THAT WHILE GOING THROUGH MEDICATION WITH PATIENT, SHE DID NOT HAVE losartan (COZAAR) 50 MG tablet. REQUESTS REFILL TO BE SENT TO LOCAL PHARMACY    Does the patient have less than a 3 day supply:  [x] Yes  [] No    Would you like a call back once the refill request has been completed: [] Yes [x] No    If the office needs to give you a call back, can they leave a voicemail: [] Yes [x] No    Dennise Jarvis Rep   04/10/23 13:32 EDT

## 2023-04-11 ENCOUNTER — HOME CARE VISIT (OUTPATIENT)
Dept: HOME HEALTH SERVICES | Facility: HOME HEALTHCARE | Age: 88
End: 2023-04-11
Payer: MEDICARE

## 2023-04-11 VITALS
RESPIRATION RATE: 17 BRPM | SYSTOLIC BLOOD PRESSURE: 112 MMHG | DIASTOLIC BLOOD PRESSURE: 64 MMHG | TEMPERATURE: 98.3 F | OXYGEN SATURATION: 95 % | HEART RATE: 83 BPM

## 2023-04-11 PROCEDURE — G0151 HHCP-SERV OF PT,EA 15 MIN: HCPCS

## 2023-04-11 NOTE — HOME HEALTH
REASON FOR REFERRAL: decreased ability to ambulate in and out of the home following recent hospital stay for sepsis due to pneumonia, and COPD flare up resulting in functional decline and LE weakness.    MEDICAL HISTORY: COPD, Bronchiectasis, Hypokalemia, CHF, chronic respiratory failure, HTN, A-fib, CAD, Asthma    THERAPY IS MEDICALLY NECESSARY FOR: Instruction/education in lower extremity strengthening HEP, bed mobility/transfer training, gait training, balance training, fall prevention, and activity tolerance training to enable patient to safely exit home for medical appointments.    Plan for next visit:  -transfer training  -gait training  -education on HEP with progressions as appropriate  -standing balance exercises

## 2023-04-12 RX ORDER — LOSARTAN POTASSIUM 50 MG/1
100 TABLET ORAL DAILY
Qty: 60 TABLET | Refills: 0 | Status: SHIPPED | OUTPATIENT
Start: 2023-04-12 | End: 2023-04-14 | Stop reason: SDUPTHER

## 2023-04-12 NOTE — TELEPHONE ENCOUNTER
Rx Refill Note  Requested Prescriptions     Pending Prescriptions Disp Refills   • losartan (COZAAR) 50 MG tablet       Sig: Take 2 tablets by mouth Daily.      Last office visit with prescribing clinician: 9/9/2022   Last telemedicine visit with prescribing clinician: 4/14/2023   Next office visit with prescribing clinician: 4/14/2023                         Would you like a call back once the refill request has been completed: [] Yes [] No    If the office needs to give you a call back, can they leave a voicemail: [] Yes [] No    Ana Cristina rBooks MA  04/12/23, 12:56 EDT

## 2023-04-13 ENCOUNTER — HOME CARE VISIT (OUTPATIENT)
Dept: HOME HEALTH SERVICES | Facility: HOME HEALTHCARE | Age: 88
End: 2023-04-13
Payer: MEDICARE

## 2023-04-13 VITALS
SYSTOLIC BLOOD PRESSURE: 110 MMHG | OXYGEN SATURATION: 96 % | HEART RATE: 82 BPM | TEMPERATURE: 97.1 F | DIASTOLIC BLOOD PRESSURE: 66 MMHG | RESPIRATION RATE: 17 BRPM

## 2023-04-13 PROCEDURE — G0151 HHCP-SERV OF PT,EA 15 MIN: HCPCS

## 2023-04-13 PROCEDURE — G0493 RN CARE EA 15 MIN HH/HOSPICE: HCPCS

## 2023-04-14 ENCOUNTER — OFFICE VISIT (OUTPATIENT)
Dept: INTERNAL MEDICINE | Facility: CLINIC | Age: 88
End: 2023-04-14
Payer: MEDICARE

## 2023-04-14 VITALS
DIASTOLIC BLOOD PRESSURE: 76 MMHG | RESPIRATION RATE: 16 BRPM | OXYGEN SATURATION: 96 % | HEART RATE: 88 BPM | TEMPERATURE: 98.2 F | SYSTOLIC BLOOD PRESSURE: 118 MMHG

## 2023-04-14 VITALS
SYSTOLIC BLOOD PRESSURE: 112 MMHG | DIASTOLIC BLOOD PRESSURE: 56 MMHG | HEART RATE: 103 BPM | BODY MASS INDEX: 22.61 KG/M2 | OXYGEN SATURATION: 97 % | WEIGHT: 127.6 LBS | HEIGHT: 63 IN

## 2023-04-14 DIAGNOSIS — D50.9 IRON DEFICIENCY ANEMIA, UNSPECIFIED IRON DEFICIENCY ANEMIA TYPE: Primary | ICD-10-CM

## 2023-04-14 DIAGNOSIS — J18.9 PNEUMONIA OF BOTH LUNGS DUE TO INFECTIOUS ORGANISM, UNSPECIFIED PART OF LUNG: ICD-10-CM

## 2023-04-14 DIAGNOSIS — E87.6 HYPOKALEMIA: ICD-10-CM

## 2023-04-14 RX ORDER — LOSARTAN POTASSIUM 50 MG/1
50 TABLET ORAL DAILY
Qty: 90 TABLET | Refills: 1 | Status: SHIPPED | OUTPATIENT
Start: 2023-04-14 | End: 2023-04-20

## 2023-04-14 NOTE — PROGRESS NOTES
Transitional Care Follow Up Visit  Subjective     Agnieszka Rizzo is a 89 y.o. female who presents for a transitional care management visit.    Within 48 business hours after discharge our office contacted her via telephone to coordinate her care and needs.      I reviewed and discussed the details of that call along with the discharge summary, hospital problems, inpatient lab results, inpatient diagnostic studies, and consultation reports with Agnieszka.     Current outpatient and discharge medications have been reconciled for the patient.  Reviewed by: Matt Rose MD          4/9/2023     2:32 PM   Date of TCM Phone Call   [facility]   Date of Admission 4/3/2023   Date of Discharge 4/9/2023   Discharge Disposition Home-Health Care [facility] [place]     Risk for Readmission (LACE) Score: 11 (4/9/2023  6:00 AM)      History of Present Illness   Course During Hospital Stay:   female with a history of afib, CHF, CAD, COPD/bronchiectasis, nocturnal hypoxia, HTN, HLD presented to [facility] complaining of cough, soa, fever. She  felt ill for nearly a week prior to seeking ER. She has an oximeter at home and had been noticing low O2 sats during the day. She uses O2 w/sleep at baseline. Cough has been mostly nonproductive.     CXR: patch densities mid lungs could be developing infiltrates; cardiomegaly.    Sepsis secondary to pneumonia/bronchiectasis/COPD flare, was treated with DuoNebs, Symbicort, mucolytic's, cough suppressants and pulmonary did evaluate during this hospital stay.   Was on cefepime which is being discontinued upon discharge and will be placed on doxycycline for 2 more days to complete a course.  Currently requiring only 2 L of oxygen and not in any respiratory distress.  Pulmonary did evaluate and cleared her to be discharged home and patient does have bronchodilators at home.     Adult Transthoracic Echo 7/2022  Calculated left ventricular EF = 55.2% Estimated left ventricular EF  was in agreement with the calculated left ventricular EF. Left ventricular systolic function is normal.  · Left ventricular wall thickness is consistent with moderate concentric hypertrophy.  · Left atrial volume is moderately increased.  · The right atrial cavity is severely dilated.  · Moderate mitral valve regurgitation is present with an eccentric jet noted.  · Moderate tricuspid valve regurgitation is present.  · Estimated right ventricular systolic pressure from tricuspid regurgitation is normal (<35 mmHg). Calculated right ventricular systolic pressure from tricuspid regurgitation is 31 mmHg.    Duplex Venous Lower Extremity  Chronic right lower extremity deep vein thrombosis noted in the soleal.  •  All other veins appeared normal bilaterally.    The following portions of the patient's history were reviewed and updated as appropriate: allergies, current medications, past family history, past medical history, past social history, past surgical history and problem list.    Review of Systems   Constitutional: Negative for activity change, appetite change and unexpected weight change.   HENT: Negative for nosebleeds and trouble swallowing.    Eyes: Negative for pain and visual disturbance.   Respiratory: Negative for chest tightness, shortness of breath and wheezing.    Cardiovascular: Negative for chest pain and palpitations.   Gastrointestinal: Negative for abdominal pain and blood in stool.   Endocrine: Negative.    Genitourinary: Negative for difficulty urinating and hematuria.   Musculoskeletal: Negative for joint swelling.   Skin: Negative for color change and rash.   Allergic/Immunologic: Negative.    Neurological: Negative for syncope and speech difficulty.   Hematological: Negative for adenopathy.   Psychiatric/Behavioral: Negative for agitation and confusion.   All other systems reviewed and are negative.      Objective   Physical Exam  Vitals and nursing note reviewed.   Constitutional:        Appearance: Normal appearance. She is well-developed. She is not diaphoretic.   HENT:      Head: Normocephalic and atraumatic.      Right Ear: Tympanic membrane, ear canal and external ear normal.      Left Ear: Tympanic membrane, ear canal and external ear normal.   Eyes:      General: Lids are normal. No scleral icterus.     Extraocular Movements: Extraocular movements intact.      Conjunctiva/sclera: Conjunctivae normal.   Neck:      Thyroid: No thyroid mass or thyromegaly.      Vascular: No carotid bruit or JVD.   Cardiovascular:      Rate and Rhythm: Normal rate and regular rhythm.      Pulses: Normal pulses.           Radial pulses are 2+ on the right side and 2+ on the left side.      Heart sounds: Normal heart sounds. No murmur heard.  Pulmonary:      Effort: Pulmonary effort is normal. No respiratory distress.      Breath sounds: Normal breath sounds.   Abdominal:      Palpations: Abdomen is soft.   Musculoskeletal:      Cervical back: Normal range of motion.      Right lower leg: No edema.      Left lower leg: No edema.   Skin:     General: Skin is warm and dry.      Coloration: Skin is not pale.      Findings: No erythema or rash.   Neurological:      General: No focal deficit present.      Mental Status: She is alert and oriented to person, place, and time.      Sensory: No sensory deficit.      Deep Tendon Reflexes: Reflexes are normal and symmetric.   Psychiatric:         Mood and Affect: Mood normal.         Behavior: Behavior normal. Behavior is cooperative.         Thought Content: Thought content normal.         Judgment: Judgment normal.         Assessment & Plan   Diagnoses and all orders for this visit:    1. Iron deficiency anemia, unspecified iron deficiency anemia type (Primary)  -     CBC & Differential    2. Hypokalemia  -     Basic Metabolic Panel    3. Pneumonia of both lungs due to infectious organism, unspecified part of lung  -     XR Chest PA & Lateral; Future    Other orders  -      losartan (COZAAR) 50 MG tablet; Take 1 tablet by mouth Daily.  Dispense: 90 tablet; Refill: 1      Follow-up results of testing

## 2023-04-14 NOTE — HOME HEALTH
PLAN FOR NEXT SNV- CHF MANAGEMENT    HX-Patient is a 89F with history of afib, CHF, CAD, COPD, nocturnal hypoxia, HTN and HLD that presents to hospital with cough, SOA and fever.   Patient developed sepsis secondary to pneumonia    Focus of care related to pneumonia including disease education and CP assessments.

## 2023-04-15 LAB
BASOPHILS # BLD AUTO: 0.1 X10E3/UL (ref 0–0.2)
BASOPHILS NFR BLD AUTO: 1 %
BUN SERPL-MCNC: 20 MG/DL (ref 10–36)
BUN/CREAT SERPL: 24 (ref 12–28)
CALCIUM SERPL-MCNC: 9.2 MG/DL (ref 8.7–10.3)
CHLORIDE SERPL-SCNC: 100 MMOL/L (ref 96–106)
CO2 SERPL-SCNC: 25 MMOL/L (ref 20–29)
CREAT SERPL-MCNC: 0.82 MG/DL (ref 0.57–1)
EGFRCR SERPLBLD CKD-EPI 2021: 67 ML/MIN/1.73
EOSINOPHIL # BLD AUTO: 0.1 X10E3/UL (ref 0–0.4)
EOSINOPHIL NFR BLD AUTO: 1 %
ERYTHROCYTE [DISTWIDTH] IN BLOOD BY AUTOMATED COUNT: 13.7 % (ref 11.7–15.4)
GLUCOSE SERPL-MCNC: 95 MG/DL (ref 70–99)
HCT VFR BLD AUTO: 35.3 % (ref 34–46.6)
HGB BLD-MCNC: 11.5 G/DL (ref 11.1–15.9)
IMM GRANULOCYTES # BLD AUTO: 0.2 X10E3/UL (ref 0–0.1)
IMM GRANULOCYTES NFR BLD AUTO: 2 %
LYMPHOCYTES # BLD AUTO: 2.4 X10E3/UL (ref 0.7–3.1)
LYMPHOCYTES NFR BLD AUTO: 25 %
MCH RBC QN AUTO: 32 PG (ref 26.6–33)
MCHC RBC AUTO-ENTMCNC: 32.6 G/DL (ref 31.5–35.7)
MCV RBC AUTO: 98 FL (ref 79–97)
MONOCYTES # BLD AUTO: 0.9 X10E3/UL (ref 0.1–0.9)
MONOCYTES NFR BLD AUTO: 9 %
NEUTROPHILS # BLD AUTO: 5.9 X10E3/UL (ref 1.4–7)
NEUTROPHILS NFR BLD AUTO: 62 %
PLATELET # BLD AUTO: 365 X10E3/UL (ref 150–450)
POTASSIUM SERPL-SCNC: 4.1 MMOL/L (ref 3.5–5.2)
RBC # BLD AUTO: 3.59 X10E6/UL (ref 3.77–5.28)
SODIUM SERPL-SCNC: 141 MMOL/L (ref 134–144)
WBC # BLD AUTO: 9.5 X10E3/UL (ref 3.4–10.8)

## 2023-04-17 ENCOUNTER — HOME CARE VISIT (OUTPATIENT)
Dept: HOME HEALTH SERVICES | Facility: HOME HEALTHCARE | Age: 88
End: 2023-04-17
Payer: MEDICARE

## 2023-04-17 VITALS
DIASTOLIC BLOOD PRESSURE: 66 MMHG | TEMPERATURE: 97.2 F | SYSTOLIC BLOOD PRESSURE: 114 MMHG | HEART RATE: 86 BPM | RESPIRATION RATE: 16 BRPM | OXYGEN SATURATION: 99 %

## 2023-04-17 PROCEDURE — G0151 HHCP-SERV OF PT,EA 15 MIN: HCPCS

## 2023-04-17 NOTE — HOME HEALTH
Patient reports no falls or changes in medication since last visit.  Patient tolerated treatment intervention well with no adverse reactions.  Patient demonstrates improved ambulatory distance today with progression to use of cane.    Plan for next visit:  -transfer training  -gait training with cane  -education on HEP with progressions as appropriate  -stair mobility training  -standing balance exercises

## 2023-04-19 ENCOUNTER — READMISSION MANAGEMENT (OUTPATIENT)
Dept: CALL CENTER | Facility: HOSPITAL | Age: 88
End: 2023-04-19
Payer: MEDICARE

## 2023-04-19 ENCOUNTER — HOME CARE VISIT (OUTPATIENT)
Dept: HOME HEALTH SERVICES | Facility: HOME HEALTHCARE | Age: 88
End: 2023-04-19
Payer: MEDICARE

## 2023-04-19 VITALS
DIASTOLIC BLOOD PRESSURE: 60 MMHG | TEMPERATURE: 98.1 F | SYSTOLIC BLOOD PRESSURE: 106 MMHG | RESPIRATION RATE: 16 BRPM | HEART RATE: 75 BPM | OXYGEN SATURATION: 98 %

## 2023-04-19 PROCEDURE — G0151 HHCP-SERV OF PT,EA 15 MIN: HCPCS

## 2023-04-19 NOTE — OUTREACH NOTE
Sepsis Week 2 Survey    Flowsheet Row Responses   [facility] patient discharged from? [facility]   Does the patient have one of the following disease processes/diagnoses(primary or secondary)? Sepsis   Week 2 attempt successful? Yes   Call start time [phone]   Call end time [phone]   Medication alerts for this patient antibiotics.   Meds reviewed with patient/caregiver? Yes   Is the patient having any side effects they believe may be caused by any medication additions or changes? No   Does the patient have all medications related to this admission filled (includes all antibiotics, inhalers, nebulizers,steroids,etc.) Yes   Is the patient taking all medications as directed (includes completed medication regime)? Yes   Comments regarding appointments Pt has f/u with pcp.   Does the patient have a primary care provider?  Yes   Has the patient kept scheduled appointments due by today? Yes   Has home health visited the patient within 72 hours of discharge? Yes   Psychosocial issues? No   What is the patient's perception of their health status since discharge? Improving   Week 2 call completed? Yes   Wrap up additional comments Pt reports feeling well. PT only using walker at night. Pt has f/u with pcp and pulmonolgy f/u in May. HH continues to visit. Pt is independent.          Beverley HOLLEY - Registered Nurse

## 2023-04-20 ENCOUNTER — OFFICE VISIT (OUTPATIENT)
Dept: CARDIOLOGY | Facility: CLINIC | Age: 88
End: 2023-04-20
Payer: MEDICARE

## 2023-04-20 VITALS
SYSTOLIC BLOOD PRESSURE: 112 MMHG | WEIGHT: 126 LBS | DIASTOLIC BLOOD PRESSURE: 62 MMHG | HEIGHT: 63 IN | HEART RATE: 64 BPM | BODY MASS INDEX: 22.32 KG/M2

## 2023-04-20 DIAGNOSIS — I50.32 CHRONIC DIASTOLIC CHF (CONGESTIVE HEART FAILURE): ICD-10-CM

## 2023-04-20 DIAGNOSIS — I48.0 PAROXYSMAL ATRIAL FIBRILLATION: ICD-10-CM

## 2023-04-20 DIAGNOSIS — I34.0 NONRHEUMATIC MITRAL VALVE REGURGITATION: ICD-10-CM

## 2023-04-20 DIAGNOSIS — I25.10 CHRONIC CORONARY ARTERY DISEASE: Primary | ICD-10-CM

## 2023-04-20 DIAGNOSIS — I49.3 VENTRICULAR PREMATURE BEATS: ICD-10-CM

## 2023-04-20 DIAGNOSIS — I10 PRIMARY HYPERTENSION: ICD-10-CM

## 2023-04-20 RX ORDER — ALBUTEROL SULFATE 90 UG/1
2 AEROSOL, METERED RESPIRATORY (INHALATION) EVERY 6 HOURS PRN
Qty: 8 G | Refills: 11 | Status: SHIPPED | OUTPATIENT
Start: 2023-04-20

## 2023-04-20 NOTE — PROGRESS NOTES
CARDIOLOGY    Marcie Robbins MD    ENCOUNTER DATE:  04/20/2023    Agnieszka Rizzo / 89 y.o. / female        CHIEF COMPLAINT / REASON FOR OFFICE VISIT     Heart Problem (2 Month follow up/PAF, HTN, CHF )      HISTORY OF PRESENT ILLNESS       HPI    Agnieszka Rizzo is a 89 y.o. female     This is a lady I met while she was hospitalized in November 2016. She has a significant pulmonary history and was having a bronchoscopy and unfortunately developed a pneumothorax. She was found to be in rate -controlled atrial fibrillation. She had previously been seen by Dr. Ana Goodrich for history of hypertension, hyperlipidemia, mild mitral valve prolapse and nonobstructive coronary artery disease diagnosed by heart catheterization in 2007.      Echocardiogram November 15, 2016:  All left ventricular wall segments contract normally.  Left ventricular function is normal. Estimated EF = 58%.  Left atrial cavity size is moderately dilated.  Mild tricuspid valve regurgitation is present.  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 They felt she would do well on oral anticoagulation and I started her on Pradaxa this was subsequently changed to warfarin. I did not do a stress test while she was hospitalized because she was wheezing and I did not fill comfortable giving her Lexiscan at that time and I did not when he is dobutamine because of her atrial fibrillation.       She was able to have a stress as an outpt on 12/19/16 and this was normal. She continued to complain of dyspnea on exertion and so I had her set up for a cardioversion. She had a couple of hospitalizations in the meantime for respiratory issues. While she was in the hospital in January 2017, I attempted to cardiovert her. I shocked her 3 times that she did not remain in sinus rhythm. I spoke with her family and we decided to continue  with rate control and anticoagulation.      She was hospitalized in 02/2017. She had started Voriconazole for treatment of pulmonary aspergillosis by Dr. Tucker. She became very nauseous and sick, and threw up for an entire day. She came into the hospital confused and weak. Her sodium was at 109. She was found to have a left clavicle fracture from a fall. She was discharged to [facility], where she has been. Unfortunately, as a result of the treatment for the aspergillosis, she developed C. difficile and was rehospitalized for treatment of this in March 2017. As a part of his treatment she did receive IV fluids. She was discharged but then I readmitted her on March 22 for IV diuresis. She was volume overloaded and in diastolic heart failure. She was back in the hospital on April 2 with more C. difficile colitis. Again she was treated with IV fluids and became volume overloaded. I diuresed her in the hospital and she was only mildly volume overloaded at discharge.     She was hospitalized from May 31 of June 9 2017.  While there she had some rapid ventricular response due to her atrial fibrillation and being sick with community-acquired pneumonia.  Some changes were made to her medication but in the end she was discharged on verapamil 360 mg once a day and digoxin 0.125 mg a day.     She was hospitalized in December 2019 with a COPD exacerbation/viral bronchitis.       I saw her in January 2023 and she was complaining of palpitations.  She wore a Zio patch which showed atrial fibrillation with a heart rate range of 51 to 168 bpm with an average of 90 bpm.  She saw [facility] Alvarado in February 2023 with palpitations and tachycardia and her metoprolol was increased.    She was hospitalized in April 2023 with sepsis/pneumonia/COPD exacerbation.  I saw her while she was in the hospital.  And did not make any changes to her medications.     She comes in today for follow-up and she is improved quite a bit since her  "hospitalization but she still feels like she should be doing more.  Her blood pressures been well controlled as has her heart rate.She complains of shortness of breath.    REVIEW OF SYSTEMS     Review of Systems   Constitutional: Negative for chills, fever, weight gain and weight loss.   Cardiovascular: Negative for leg swelling.   Respiratory: Positive for cough and wheezing. Negative for snoring.    Hematologic/Lymphatic: Negative for bleeding problem. Bruises/bleeds easily.   Skin: Negative for color change.   Musculoskeletal: Negative for falls, joint pain and myalgias.   Gastrointestinal: Negative for melena.   Genitourinary: Negative for hematuria.   Neurological: Negative for excessive daytime sleepiness.   Psychiatric/Behavioral: Positive for depression. The patient is nervous/anxious.          VITAL SIGNS     Visit Vitals  /62 (BP Location: Right arm, Patient Position: Sitting, Cuff Size: Adult)   Pulse 64   Ht 160 cm (63\")   Wt 57.2 kg (126 lb)   BMI 22.32 kg/m²         Wt Readings from Last 3 Encounters:   04/20/23 57.2 kg (126 lb)   04/14/23 57.9 kg (127 lb 9.6 oz)   04/05/23 54.5 kg (120 lb 1.6 oz)     Body mass index is 22.32 kg/m².      PHYSICAL EXAMINATION     Constitutional:       General: Not in acute distress.  Neck:      Vascular: No carotid bruit or JVD.   Pulmonary:      Effort: Pulmonary effort is normal.      Breath sounds: Normal breath sounds.   Cardiovascular:      Normal rate. Irregularly irregular rhythm.      Murmurs: There is no murmur.   Psychiatric:         Mood and Affect: Mood and affect normal.           REVIEWED DATA       ECG 12 Lead    Date/Time: 4/20/2023 11:40 AM  Performed by: Marcie Robbins MD  Authorized by: Marcie Robbins MD   Comparison: compared with previous ECG from 4/3/2023  Rhythm: atrial fibrillation  BPM: 64  Conduction: conduction normal  ST Segments: ST segments normal  T Waves: T waves normal    Clinical impression: abnormal EKG              Lab " Results   Component Value Date    GLUCOSE 95 04/14/2023    BUN 20 04/14/2023    CREATININE 0.82 04/14/2023    EGFRRESULT 67 04/14/2023    EGFR 69.2 04/09/2023    BCR 24 04/14/2023    K 4.1 04/14/2023    CO2 25 04/14/2023    CALCIUM 9.2 04/14/2023    ALBUMIN 4.2 04/03/2023    BILITOT 1.1 04/03/2023    AST 22 04/03/2023    ALT 16 04/03/2023       ASSESSMENT & PLAN      Diagnosis Plan   1. Chronic coronary artery disease        2. Chronic diastolic CHF (congestive heart failure) (HCC)        3. Nonrheumatic mitral valve regurgitation        4. Paroxysmal atrial fibrillation (HCC)        5. Primary hypertension        6. Ventricular premature beats            1. Atrial fibrillation. She failed cardioversion in January 2017. She has a CHADS2-Vasc score of 5.  She had been having problems with bleeding and bruising and her INR had been bouncing around.    This is better since being on Eliquis 2.5 mg twice a day.  Continue current dose.  Her rate is well controlled on metoprolol.     2. Chronic diastolic heart failure. She is on torsemide.  She is euvolemic.     3. Dyspnea on exertion. Multifactorial.  Improving.  She is exercising regularly.     4. Mitral valve prolapse with very mild mitral regurgitation.     5. Mild tricuspid regurgitation without pulmonary hypertension.     6. Nonobstructive coronary artery disease diagnosed by catheter in 2007. She has noted coronary artery calcification on CT scans. Nuclear stress 12/16 was normal.  Nuclear stress test in 2018 was also normal.       7. Hypertension.  She is only on metoprolol and torsemide.  She is no longer on an angiotensin receptor blocker.     8. Hyperlipidemia.  We discussed statin therapy today.  At her age, I do not know that is much benefit.  I am going to discontinue atorvastatin and Zetia.     9. Hyponatremia. Stable.     10. C. difficile diarrhea. 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        I will see her back in 6 months unless she has any problems sooner.      Orders Placed This Encounter   Procedures   • ECG 12 Lead     This order was created via procedure documentation     Order Specific Question:   Release to patient     Answer:   Routine Release           MEDICATIONS         Discharge Medications          Accurate as of April 20, 2023 11:41 AM. If you have any questions, ask your nurse or doctor.            Changes to Medications      Instructions Start Date   albuterol sulfate  (90 Base) MCG/ACT inhaler  Commonly known as: PROVENTIL HFA;VENTOLIN HFA;PROAIR HFA  What changed: reasons to take this  Changed by: Marcie Robbins MD   2 puffs, Inhalation, Every 6 Hours PRN         Continue These Medications      Instructions Start Date   acetaminophen 325 MG tablet  Commonly known as: TYLENOL   650 mg, Oral, Every 4 Hours PRN      apixaban 2.5 MG tablet tablet  Commonly known as: ELIQUIS   2.5 mg, Oral, Every 12 Hours Scheduled      azithromycin 250 MG tablet  Commonly known as: ZITHROMAX   250 mg, Oral, Daily      benzonatate 200 MG capsule  Commonly known as: TESSALON   200 mg, Oral, 3 Times Daily PRN      cholecalciferol 25 MCG (1000 UT) tablet  Commonly known as: VITAMIN D3   1,000 Units, Oral, Daily      citalopram 10 MG tablet  Commonly known as: CeleXA   10 mg, Oral, Daily      FeroSul 325 (65 FE) MG tablet  Generic drug: ferrous sulfate   TAKE ONE TABLET BY MOUTH DAILY WITH BREAKFAST      fexofenadine 180 MG tablet  Commonly known as: ALLEGRA   180 mg, Oral, Daily      Florajen Acidophilus capsule   1 tablet, Oral, Daily      fluticasone 50 MCG/ACT nasal spray  Commonly known as: FLONASE   2 sprays, Nasal, Nightly PRN      fluticasone-salmeterol 230-21 MCG/ACT inhaler  Commonly known as: ADVAIR HFA   2 puffs, Inhalation, 2 Times Daily - RT      glucosamine-chondroitin 500-400 MG capsule capsule   1 capsule, Oral, Daily      guaiFENesin 600 MG 12 hr tablet  Commonly known as:  MUCINEX   600 mg, Oral, 2 Times Daily      ipratropium-albuterol 0.5-2.5 mg/3 ml nebulizer  Commonly known as: DUO-NEB   3 mL, Nebulization, 2 Times Daily, SOB/WHEEZING      metoprolol tartrate 50 MG tablet  Commonly known as: LOPRESSOR   50 mg, Oral, Every 12 Hours Scheduled      multivitamin with minerals tablet tablet   1 tablet, Oral, 2 Times Daily      multivitamin with minerals tablet tablet   1 tablet, Oral, 2 Times Daily      O2  Commonly known as: OXYGEN   3 L/min, Inhalation, Nightly      potassium chloride 10 MEQ CR capsule  Commonly known as: MICRO-K   10 mEq, Oral, Daily      sodium chloride 0.9 % nebulizer solution   3 mL, Nebulization, 2 Times Daily      torsemide 20 MG tablet  Commonly known as: DEMADEX   20 mg, Oral, 2 Times Daily         Stop These Medications    atorvastatin 40 MG tablet  Commonly known as: LIPITOR  Stopped by: Marcie Robbins MD     ezetimibe 10 MG tablet  Commonly known as: ZETIA  Stopped by: Marcie Robbins MD     losartan 50 MG tablet  Commonly known as: COZAAR  Stopped by: MD Marcie Small MD  04/20/23  11:41 EDT    Part of this note may be an electronic transcription/translation of spoken language to printed text using the Dragon dictation system.

## 2023-04-21 ENCOUNTER — HOME CARE VISIT (OUTPATIENT)
Dept: HOME HEALTH SERVICES | Facility: HOME HEALTHCARE | Age: 88
End: 2023-04-21
Payer: MEDICARE

## 2023-04-21 VITALS
DIASTOLIC BLOOD PRESSURE: 60 MMHG | SYSTOLIC BLOOD PRESSURE: 110 MMHG | TEMPERATURE: 97.8 F | WEIGHT: 123 LBS | HEART RATE: 83 BPM | RESPIRATION RATE: 16 BRPM | OXYGEN SATURATION: 96 % | BODY MASS INDEX: 21.79 KG/M2

## 2023-04-21 PROCEDURE — G0493 RN CARE EA 15 MIN HH/HOSPICE: HCPCS

## 2023-04-21 NOTE — HOME HEALTH
PLAN FOR NEXT SNV- D/C SN NEXT SNV    HX-Patient is a 89F with history of afib, CHF, CAD, COPD, nocturnal hypoxia, HTN and HLD that presents to hospital with cough, SOA and fever.   Patient developed sepsis secondary to pneumonia    Focus of care related to pneumonia including disease education and CP assessments.    4/21- PT SAW CARDIOLOGIST YESTERDAY- DENIES MED CHANGES RTC 6 MONTHS, PT STS TOLERATED OUTING TO MD APPT W/O ADVERSE EFFECT. VS WNL, CLEAR LUNG SOUNDS, STS COMPLIANT W/ 02 AT HS.

## 2023-04-24 ENCOUNTER — HOME CARE VISIT (OUTPATIENT)
Dept: HOME HEALTH SERVICES | Facility: HOME HEALTHCARE | Age: 88
End: 2023-04-24
Payer: MEDICARE

## 2023-04-24 VITALS
RESPIRATION RATE: 16 BRPM | OXYGEN SATURATION: 97 % | TEMPERATURE: 97.6 F | DIASTOLIC BLOOD PRESSURE: 66 MMHG | HEART RATE: 84 BPM | SYSTOLIC BLOOD PRESSURE: 112 MMHG

## 2023-04-24 PROCEDURE — G0151 HHCP-SERV OF PT,EA 15 MIN: HCPCS

## 2023-04-24 NOTE — HOME HEALTH
Patient reports no falls or changes in medication since last visit.    Plan for next visit:  -transfer training  -gait training with rollator on outdoor/uneven surfaces  -education on HEP with progressions as appropriate  -stair mobility training  -standing balance exercises

## 2023-04-26 ENCOUNTER — HOME CARE VISIT (OUTPATIENT)
Dept: HOME HEALTH SERVICES | Facility: HOME HEALTHCARE | Age: 88
End: 2023-04-26
Payer: MEDICARE

## 2023-04-26 VITALS
SYSTOLIC BLOOD PRESSURE: 114 MMHG | RESPIRATION RATE: 16 BRPM | HEART RATE: 60 BPM | OXYGEN SATURATION: 98 % | DIASTOLIC BLOOD PRESSURE: 62 MMHG | TEMPERATURE: 97.1 F

## 2023-04-26 PROCEDURE — G0151 HHCP-SERV OF PT,EA 15 MIN: HCPCS

## 2023-04-26 NOTE — HOME HEALTH
Patient reports no falls or changes in medication since last visit.    Plan for next visit:  -transfer training  -gait training on level and uneven surfaces  -education on HEP with progressions as appropriate  -stair mobility training  -standing balance exercises

## 2023-05-03 RX ORDER — CITALOPRAM 10 MG/1
TABLET ORAL
Qty: 90 TABLET | Refills: 1 | Status: SHIPPED | OUTPATIENT
Start: 2023-05-03

## 2023-05-31 RX ORDER — METOPROLOL TARTRATE 50 MG/1
50 TABLET, FILM COATED ORAL EVERY 12 HOURS SCHEDULED
Qty: 180 TABLET | Refills: 3
Start: 2023-05-31

## 2023-05-31 NOTE — TELEPHONE ENCOUNTER
Caller: Agnieszka Rizzo    Relationship: Self    Best call back number: [phone]    Requested Prescriptions:   Requested Prescriptions     Pending Prescriptions Disp Refills   • metoprolol tartrate (LOPRESSOR) 50 MG tablet 180 tablet 3     Sig: Take 1 tablet by mouth Every 12 (Twelve) Hours.        Pharmacy where request should be sent: [facility] [address] [phone] [facility] [phone]      Last office visit with prescribing clinician: 4/20/2023   Last telemedicine visit with prescribing clinician: Visit date not found   Next office visit with prescribing clinician: 10/19/2023     Additional details provided by patient: PATIENT HAS NO MEDICATION.     Does the patient have less than a 3 day supply:  [x] Yes  [] No    Would you like a call back once the refill request has been completed: [x] Yes [] No    If the office needs to give you a call back, can they leave a voicemail: [x] Yes [] No    Dennise Flores Rep   05/31/23 10:57 EDT

## 2023-06-06 ENCOUNTER — TELEPHONE (OUTPATIENT)
Dept: CARDIOLOGY | Facility: CLINIC | Age: 88
End: 2023-06-06
Payer: MEDICARE

## 2023-06-06 RX ORDER — METOPROLOL TARTRATE 50 MG/1
50 TABLET, FILM COATED ORAL EVERY 12 HOURS SCHEDULED
Qty: 180 TABLET | Refills: 3 | Status: SHIPPED | OUTPATIENT
Start: 2023-06-06 | End: 2023-06-07 | Stop reason: SDUPTHER

## 2023-06-06 NOTE — TELEPHONE ENCOUNTER
Caller: Agnieszka Rizzo    Relationship: Self    Best call back number: [phone]    Requested Prescriptions:   Requested Prescriptions     Pending Prescriptions Disp Refills    metoprolol tartrate (LOPRESSOR) 50 MG tablet 180 tablet 3     Sig: Take 1 tablet by mouth Every 12 (Twelve) Hours.        Pharmacy where request should be sent: [facility]  [address]    Last office visit with prescribing clinician: 4/20/2023   Last telemedicine visit with prescribing clinician: Visit date not found   Next office visit with prescribing clinician: 10/19/2023     Additional details provided by patient: PT HAS BEEN COMPLETELY OUT OF MEDICATION. PT ALSO NEEDS SAMPLES OF ELIQUIS 2.5MG    Does the patient have less than a 3 day supply:  [x] Yes  [] No    Would you like a call back once the refill request has been completed: [] Yes [x] No    If the office needs to give you a call back, can they leave a voicemail: [x] Yes [] No    Dennise Chavez   06/06/23 11:43 EDT

## 2023-06-07 RX ORDER — METOPROLOL TARTRATE 50 MG/1
50 TABLET, FILM COATED ORAL EVERY 12 HOURS SCHEDULED
Qty: 180 TABLET | Refills: 3 | Status: SHIPPED | OUTPATIENT
Start: 2023-06-07

## 2023-06-07 NOTE — TELEPHONE ENCOUNTER
Metoprolol was sent to the wrong pharmacy. Patient is completely out and needs this sent to [facility] on [address].       They are also requesting a prescription sent to the pharmacy for patients Eliquis 2.5 MG     Rx Refill Note  Requested Prescriptions     Pending Prescriptions Disp Refills    metoprolol tartrate (LOPRESSOR) 50 MG tablet 180 tablet 3     Sig: Take 1 tablet by mouth Every 12 (Twelve) Hours.    apixaban (ELIQUIS) 2.5 MG tablet tablet 14 tablet 0     Sig: Take 1 tablet by mouth Every 12 (Twelve) Hours. Indications: Other - full anticoagulation     Signed Prescriptions Disp Refills    metoprolol tartrate (LOPRESSOR) 50 MG tablet 180 tablet 3     Sig: Take 1 tablet by mouth Every 12 (Twelve) Hours.     Authorizing Provider: ANGELI STODDARD      Last office visit with prescribing clinician: 4/20/2023   Last telemedicine visit with prescribing clinician: Visit date not found   Next office visit with prescribing clinician: 10/19/2023                         Would you like a call back once the refill request has been completed: [] Yes [] No    If the office needs to give you a call back, can they leave a voicemail: [] Yes [] No    Kamryn Harrison MA  06/07/23, 10:34 EDT

## 2023-06-12 ENCOUNTER — TELEPHONE (OUTPATIENT)
Dept: CARDIOLOGY | Facility: CLINIC | Age: 88
End: 2023-06-12

## 2023-06-12 NOTE — TELEPHONE ENCOUNTER
Caller: Matthias Agnieszka CARLA    Relationship: Self    Best call back number: [phone]    What is the best time to reach you: ANYTIME    What was the call regarding: Agnieszka AGUIRRE Matthias IS CALLING IN FOR SAMPLES ELIGUIS. PLEASE REACH OUT TO PATIENT TO ADVISE IF SAMPLES ARE AVAILABLE [facility] AS SOON AS POSSIBLE. PATIENT IS OUT OF MEDICATION.     Is it okay if the provider responds through [facility]: NO, PREFER CALLS

## 2023-08-04 ENCOUNTER — HOSPITAL ENCOUNTER (INPATIENT)
Facility: HOSPITAL | Age: 88
LOS: 2 days | Discharge: HOME | DRG: 280 | End: 2023-08-06
Attending: EMERGENCY MEDICINE | Admitting: INTERNAL MEDICINE
Payer: MEDICARE

## 2023-08-04 ENCOUNTER — APPOINTMENT (OUTPATIENT)
Dept: GENERAL RADIOLOGY | Facility: HOSPITAL | Age: 88
DRG: 280 | End: 2023-08-04
Payer: MEDICARE

## 2023-08-04 DIAGNOSIS — I10 BENIGN ESSENTIAL HYPERTENSION: ICD-10-CM

## 2023-08-04 DIAGNOSIS — R06.09 DOE (DYSPNEA ON EXERTION): ICD-10-CM

## 2023-08-04 DIAGNOSIS — J81.0 ACUTE PULMONARY EDEMA: Primary | ICD-10-CM

## 2023-08-04 LAB
ALBUMIN SERPL-MCNC: 3.5 G/DL (ref 3.5–5.2)
ALBUMIN/GLOB SERPL: 1.1 G/DL
ALP SERPL-CCNC: 113 U/L (ref 39–117)
ALT SERPL W P-5'-P-CCNC: 35 U/L (ref 1–33)
ANION GAP SERPL CALCULATED.3IONS-SCNC: 11.2 MMOL/L (ref 5–15)
AST SERPL-CCNC: 22 U/L (ref 1–32)
B PARAPERT DNA SPEC QL NAA+PROBE: NOT DETECTED
B PERT DNA SPEC QL NAA+PROBE: NOT DETECTED
BASOPHILS # BLD AUTO: 0.04 10*3/MM3 (ref 0–0.2)
BASOPHILS NFR BLD AUTO: 0.4 % (ref 0–1.5)
BILIRUB SERPL-MCNC: 1.2 MG/DL (ref 0–1.2)
BUN SERPL-MCNC: 10 MG/DL (ref 8–23)
BUN/CREAT SERPL: 12.7 (ref 7–25)
C PNEUM DNA NPH QL NAA+NON-PROBE: NOT DETECTED
CALCIUM SPEC-SCNC: 9.4 MG/DL (ref 8.2–9.6)
CHLORIDE SERPL-SCNC: 103 MMOL/L (ref 98–107)
CO2 SERPL-SCNC: 23.8 MMOL/L (ref 22–29)
CREAT SERPL-MCNC: 0.79 MG/DL (ref 0.57–1)
D-LACTATE SERPL-SCNC: 1.4 MMOL/L (ref 0.5–2)
DEPRECATED RDW RBC AUTO: 47.2 FL (ref 37–54)
EGFRCR SERPLBLD CKD-EPI 2021: 70.3 ML/MIN/1.73
EOSINOPHIL # BLD AUTO: 0.15 10*3/MM3 (ref 0–0.4)
EOSINOPHIL NFR BLD AUTO: 1.5 % (ref 0.3–6.2)
ERYTHROCYTE [DISTWIDTH] IN BLOOD BY AUTOMATED COUNT: 13.2 % (ref 12.3–15.4)
FLUAV SUBTYP SPEC NAA+PROBE: NOT DETECTED
FLUBV RNA ISLT QL NAA+PROBE: NOT DETECTED
GEN 5 2HR TROPONIN T REFLEX: 19 NG/L
GLOBULIN UR ELPH-MCNC: 3.2 GM/DL
GLUCOSE SERPL-MCNC: 125 MG/DL (ref 65–99)
HADV DNA SPEC NAA+PROBE: NOT DETECTED
HCOV 229E RNA SPEC QL NAA+PROBE: NOT DETECTED
HCOV HKU1 RNA SPEC QL NAA+PROBE: NOT DETECTED
HCOV NL63 RNA SPEC QL NAA+PROBE: NOT DETECTED
HCOV OC43 RNA SPEC QL NAA+PROBE: NOT DETECTED
HCT VFR BLD AUTO: 35.5 % (ref 34–46.6)
HGB BLD-MCNC: 11.8 G/DL (ref 12–15.9)
HMPV RNA NPH QL NAA+NON-PROBE: NOT DETECTED
HOLD SPECIMEN: NORMAL
HOLD SPECIMEN: NORMAL
HPIV1 RNA ISLT QL NAA+PROBE: NOT DETECTED
HPIV2 RNA SPEC QL NAA+PROBE: NOT DETECTED
HPIV3 RNA NPH QL NAA+PROBE: NOT DETECTED
HPIV4 P GENE NPH QL NAA+PROBE: NOT DETECTED
IMM GRANULOCYTES # BLD AUTO: 0.09 10*3/MM3 (ref 0–0.05)
IMM GRANULOCYTES NFR BLD AUTO: 0.9 % (ref 0–0.5)
LYMPHOCYTES # BLD AUTO: 1.83 10*3/MM3 (ref 0.7–3.1)
LYMPHOCYTES NFR BLD AUTO: 17.7 % (ref 19.6–45.3)
M PNEUMO IGG SER IA-ACNC: NOT DETECTED
MCH RBC QN AUTO: 32.1 PG (ref 26.6–33)
MCHC RBC AUTO-ENTMCNC: 33.2 G/DL (ref 31.5–35.7)
MCV RBC AUTO: 96.5 FL (ref 79–97)
MONOCYTES # BLD AUTO: 1.07 10*3/MM3 (ref 0.1–0.9)
MONOCYTES NFR BLD AUTO: 10.4 % (ref 5–12)
NEUTROPHILS NFR BLD AUTO: 69.1 % (ref 42.7–76)
NEUTROPHILS NFR BLD AUTO: 7.15 10*3/MM3 (ref 1.7–7)
NRBC BLD AUTO-RTO: 0 /100 WBC (ref 0–0.2)
NT-PROBNP SERPL-MCNC: 3720 PG/ML (ref 0–1800)
PLATELET # BLD AUTO: 285 10*3/MM3 (ref 140–450)
PMV BLD AUTO: 9.8 FL (ref 6–12)
POTASSIUM SERPL-SCNC: 4.3 MMOL/L (ref 3.5–5.2)
PROCALCITONIN SERPL-MCNC: 0.05 NG/ML (ref 0–0.25)
PROT SERPL-MCNC: 6.7 G/DL (ref 6–8.5)
QT INTERVAL: 359 MS
RBC # BLD AUTO: 3.68 10*6/MM3 (ref 3.77–5.28)
RHINOVIRUS RNA SPEC NAA+PROBE: NOT DETECTED
RSV RNA NPH QL NAA+NON-PROBE: NOT DETECTED
SARS-COV-2 RNA NPH QL NAA+NON-PROBE: NOT DETECTED
SODIUM SERPL-SCNC: 138 MMOL/L (ref 136–145)
TROPONIN T DELTA: -1 NG/L
TROPONIN T SERPL HS-MCNC: 20 NG/L
TROPONIN T SERPL HS-MCNC: 22 NG/L
WBC NRBC COR # BLD: 10.33 10*3/MM3 (ref 3.4–10.8)
WHOLE BLOOD HOLD COAG: NORMAL
WHOLE BLOOD HOLD SPECIMEN: NORMAL

## 2023-08-04 PROCEDURE — 94761 N-INVAS EAR/PLS OXIMETRY MLT: CPT

## 2023-08-04 PROCEDURE — 87070 CULTURE OTHR SPECIMN AEROBIC: CPT

## 2023-08-04 PROCEDURE — 87040 BLOOD CULTURE FOR BACTERIA: CPT | Performed by: PHYSICIAN ASSISTANT

## 2023-08-04 PROCEDURE — 85025 COMPLETE CBC W/AUTO DIFF WBC: CPT | Performed by: PHYSICIAN ASSISTANT

## 2023-08-04 PROCEDURE — 84484 ASSAY OF TROPONIN QUANT: CPT | Performed by: PHYSICIAN ASSISTANT

## 2023-08-04 PROCEDURE — 94640 AIRWAY INHALATION TREATMENT: CPT

## 2023-08-04 PROCEDURE — 94760 N-INVAS EAR/PLS OXIMETRY 1: CPT

## 2023-08-04 PROCEDURE — 83880 ASSAY OF NATRIURETIC PEPTIDE: CPT | Performed by: PHYSICIAN ASSISTANT

## 2023-08-04 PROCEDURE — 87205 SMEAR GRAM STAIN: CPT

## 2023-08-04 PROCEDURE — 93005 ELECTROCARDIOGRAM TRACING: CPT | Performed by: INTERNAL MEDICINE

## 2023-08-04 PROCEDURE — 25010000002 METHYLPREDNISOLONE PER 125 MG: Performed by: PHYSICIAN ASSISTANT

## 2023-08-04 PROCEDURE — 84484 ASSAY OF TROPONIN QUANT: CPT | Performed by: INTERNAL MEDICINE

## 2023-08-04 PROCEDURE — 93005 ELECTROCARDIOGRAM TRACING: CPT | Performed by: EMERGENCY MEDICINE

## 2023-08-04 PROCEDURE — 93010 ELECTROCARDIOGRAM REPORT: CPT | Performed by: INTERNAL MEDICINE

## 2023-08-04 PROCEDURE — 0202U NFCT DS 22 TRGT SARS-COV-2: CPT | Performed by: PHYSICIAN ASSISTANT

## 2023-08-04 PROCEDURE — 84145 PROCALCITONIN (PCT): CPT | Performed by: PHYSICIAN ASSISTANT

## 2023-08-04 PROCEDURE — 0 CEFEPIME PER 500 MG

## 2023-08-04 PROCEDURE — 94799 UNLISTED PULMONARY SVC/PX: CPT

## 2023-08-04 PROCEDURE — 83605 ASSAY OF LACTIC ACID: CPT | Performed by: PHYSICIAN ASSISTANT

## 2023-08-04 PROCEDURE — 80053 COMPREHEN METABOLIC PANEL: CPT | Performed by: PHYSICIAN ASSISTANT

## 2023-08-04 PROCEDURE — 25010000002 FUROSEMIDE PER 20 MG: Performed by: PHYSICIAN ASSISTANT

## 2023-08-04 PROCEDURE — 99285 EMERGENCY DEPT VISIT HI MDM: CPT

## 2023-08-04 PROCEDURE — 71045 X-RAY EXAM CHEST 1 VIEW: CPT

## 2023-08-04 PROCEDURE — 36415 COLL VENOUS BLD VENIPUNCTURE: CPT

## 2023-08-04 PROCEDURE — 25010000002 FUROSEMIDE PER 20 MG: Performed by: INTERNAL MEDICINE

## 2023-08-04 RX ORDER — BISACODYL 10 MG
10 SUPPOSITORY, RECTAL RECTAL DAILY PRN
Status: DISCONTINUED | OUTPATIENT
Start: 2023-08-04 | End: 2023-08-06 | Stop reason: HOSPADM

## 2023-08-04 RX ORDER — IPRATROPIUM BROMIDE AND ALBUTEROL SULFATE 2.5; .5 MG/3ML; MG/3ML
3 SOLUTION RESPIRATORY (INHALATION)
Status: DISCONTINUED | OUTPATIENT
Start: 2023-08-05 | End: 2023-08-06 | Stop reason: HOSPADM

## 2023-08-04 RX ORDER — ONDANSETRON 4 MG/1
4 TABLET, FILM COATED ORAL EVERY 6 HOURS PRN
Status: DISCONTINUED | OUTPATIENT
Start: 2023-08-04 | End: 2023-08-06 | Stop reason: HOSPADM

## 2023-08-04 RX ORDER — METOPROLOL TARTRATE 50 MG/1
50 TABLET, FILM COATED ORAL EVERY 12 HOURS SCHEDULED
Status: DISCONTINUED | OUTPATIENT
Start: 2023-08-04 | End: 2023-08-05

## 2023-08-04 RX ORDER — L.ACID,PARA/B.BIFIDUM/S.THERM 8B CELL
1 CAPSULE ORAL DAILY
Status: DISCONTINUED | OUTPATIENT
Start: 2023-08-04 | End: 2023-08-06 | Stop reason: HOSPADM

## 2023-08-04 RX ORDER — CETIRIZINE HYDROCHLORIDE 10 MG/1
10 TABLET ORAL DAILY
Status: DISCONTINUED | OUTPATIENT
Start: 2023-08-04 | End: 2023-08-06 | Stop reason: HOSPADM

## 2023-08-04 RX ORDER — BISACODYL 5 MG/1
5 TABLET, DELAYED RELEASE ORAL DAILY PRN
Status: DISCONTINUED | OUTPATIENT
Start: 2023-08-04 | End: 2023-08-06 | Stop reason: HOSPADM

## 2023-08-04 RX ORDER — MULTIPLE VITAMINS W/ MINERALS TAB 9MG-400MCG
1 TAB ORAL 2 TIMES DAILY
Status: DISCONTINUED | OUTPATIENT
Start: 2023-08-04 | End: 2023-08-06 | Stop reason: HOSPADM

## 2023-08-04 RX ORDER — ONDANSETRON 2 MG/ML
4 INJECTION INTRAMUSCULAR; INTRAVENOUS EVERY 6 HOURS PRN
Status: DISCONTINUED | OUTPATIENT
Start: 2023-08-04 | End: 2023-08-06 | Stop reason: HOSPADM

## 2023-08-04 RX ORDER — METHYLPREDNISOLONE SODIUM SUCCINATE 125 MG/2ML
125 INJECTION, POWDER, LYOPHILIZED, FOR SOLUTION INTRAMUSCULAR; INTRAVENOUS ONCE
Status: COMPLETED | OUTPATIENT
Start: 2023-08-04 | End: 2023-08-04

## 2023-08-04 RX ORDER — POTASSIUM CHLORIDE 750 MG/1
10 TABLET, FILM COATED, EXTENDED RELEASE ORAL DAILY
Status: DISCONTINUED | OUTPATIENT
Start: 2023-08-04 | End: 2023-08-06

## 2023-08-04 RX ORDER — SODIUM CHLORIDE 0.9 % (FLUSH) 0.9 %
10 SYRINGE (ML) INJECTION AS NEEDED
Status: DISCONTINUED | OUTPATIENT
Start: 2023-08-04 | End: 2023-08-06 | Stop reason: HOSPADM

## 2023-08-04 RX ORDER — BUDESONIDE AND FORMOTEROL FUMARATE DIHYDRATE 160; 4.5 UG/1; UG/1
1 AEROSOL RESPIRATORY (INHALATION)
Status: DISCONTINUED | OUTPATIENT
Start: 2023-08-04 | End: 2023-08-06 | Stop reason: HOSPADM

## 2023-08-04 RX ORDER — FUROSEMIDE 10 MG/ML
40 INJECTION INTRAMUSCULAR; INTRAVENOUS ONCE
Status: COMPLETED | OUTPATIENT
Start: 2023-08-04 | End: 2023-08-04

## 2023-08-04 RX ORDER — ACETAMINOPHEN 325 MG/1
650 TABLET ORAL EVERY 4 HOURS PRN
Status: DISCONTINUED | OUTPATIENT
Start: 2023-08-04 | End: 2023-08-06 | Stop reason: HOSPADM

## 2023-08-04 RX ORDER — ALBUTEROL SULFATE 2.5 MG/3ML
2.5 SOLUTION RESPIRATORY (INHALATION) EVERY 6 HOURS PRN
Status: DISCONTINUED | OUTPATIENT
Start: 2023-08-04 | End: 2023-08-06 | Stop reason: HOSPADM

## 2023-08-04 RX ORDER — POLYETHYLENE GLYCOL 3350 17 G/17G
17 POWDER, FOR SOLUTION ORAL DAILY PRN
Status: DISCONTINUED | OUTPATIENT
Start: 2023-08-04 | End: 2023-08-06 | Stop reason: HOSPADM

## 2023-08-04 RX ORDER — FLUTICASONE PROPIONATE 50 MCG
2 SPRAY, SUSPENSION (ML) NASAL NIGHTLY PRN
Status: DISCONTINUED | OUTPATIENT
Start: 2023-08-04 | End: 2023-08-06 | Stop reason: HOSPADM

## 2023-08-04 RX ORDER — IPRATROPIUM BROMIDE AND ALBUTEROL SULFATE 2.5; .5 MG/3ML; MG/3ML
6 SOLUTION RESPIRATORY (INHALATION) ONCE
Status: COMPLETED | OUTPATIENT
Start: 2023-08-04 | End: 2023-08-04

## 2023-08-04 RX ORDER — GUAIFENESIN 600 MG/1
600 TABLET, EXTENDED RELEASE ORAL 2 TIMES DAILY
Status: DISCONTINUED | OUTPATIENT
Start: 2023-08-04 | End: 2023-08-06 | Stop reason: HOSPADM

## 2023-08-04 RX ORDER — AMOXICILLIN 250 MG
2 CAPSULE ORAL 2 TIMES DAILY
Status: DISCONTINUED | OUTPATIENT
Start: 2023-08-04 | End: 2023-08-06 | Stop reason: HOSPADM

## 2023-08-04 RX ORDER — UREA 10 %
3 LOTION (ML) TOPICAL NIGHTLY PRN
Status: DISCONTINUED | OUTPATIENT
Start: 2023-08-04 | End: 2023-08-06 | Stop reason: HOSPADM

## 2023-08-04 RX ORDER — FERROUS SULFATE 325(65) MG
325 TABLET ORAL DAILY
Status: DISCONTINUED | OUTPATIENT
Start: 2023-08-04 | End: 2023-08-06 | Stop reason: HOSPADM

## 2023-08-04 RX ORDER — MELATONIN
1000 DAILY
Status: DISCONTINUED | OUTPATIENT
Start: 2023-08-04 | End: 2023-08-06 | Stop reason: HOSPADM

## 2023-08-04 RX ORDER — BENZONATATE 100 MG/1
200 CAPSULE ORAL 3 TIMES DAILY PRN
Status: DISCONTINUED | OUTPATIENT
Start: 2023-08-04 | End: 2023-08-06 | Stop reason: HOSPADM

## 2023-08-04 RX ORDER — FUROSEMIDE 10 MG/ML
40 INJECTION INTRAMUSCULAR; INTRAVENOUS EVERY 12 HOURS
Status: DISCONTINUED | OUTPATIENT
Start: 2023-08-04 | End: 2023-08-05

## 2023-08-04 RX ORDER — AZITHROMYCIN 250 MG/1
250 TABLET, FILM COATED ORAL
Status: DISCONTINUED | OUTPATIENT
Start: 2023-08-04 | End: 2023-08-04

## 2023-08-04 RX ORDER — IPRATROPIUM BROMIDE AND ALBUTEROL SULFATE 2.5; .5 MG/3ML; MG/3ML
3 SOLUTION RESPIRATORY (INHALATION) 2 TIMES DAILY
Status: DISCONTINUED | OUTPATIENT
Start: 2023-08-04 | End: 2023-08-04

## 2023-08-04 RX ORDER — SODIUM CHLORIDE FOR INHALATION 0.9 %
3 VIAL, NEBULIZER (ML) INHALATION 2 TIMES DAILY
Status: DISCONTINUED | OUTPATIENT
Start: 2023-08-04 | End: 2023-08-04

## 2023-08-04 RX ORDER — CITALOPRAM HYDROBROMIDE 10 MG/1
10 TABLET ORAL DAILY
Status: DISCONTINUED | OUTPATIENT
Start: 2023-08-04 | End: 2023-08-06 | Stop reason: HOSPADM

## 2023-08-04 RX ADMIN — ISODIUM CHLORIDE 3 ML: 0.03 SOLUTION RESPIRATORY (INHALATION) at 19:01

## 2023-08-04 RX ADMIN — SENNOSIDES AND DOCUSATE SODIUM 2 TABLET: 50; 8.6 TABLET ORAL at 21:16

## 2023-08-04 RX ADMIN — FUROSEMIDE 40 MG: 20 INJECTION, SOLUTION INTRAMUSCULAR; INTRAVENOUS at 12:51

## 2023-08-04 RX ADMIN — Medication 10 ML: at 21:17

## 2023-08-04 RX ADMIN — FUROSEMIDE 40 MG: 10 INJECTION, SOLUTION INTRAMUSCULAR; INTRAVENOUS at 22:59

## 2023-08-04 RX ADMIN — CEFEPIME 2000 MG: 2 INJECTION, POWDER, FOR SOLUTION INTRAVENOUS at 18:55

## 2023-08-04 RX ADMIN — IPRATROPIUM BROMIDE AND ALBUTEROL SULFATE 3 ML: .5; 2.5 SOLUTION RESPIRATORY (INHALATION) at 19:00

## 2023-08-04 RX ADMIN — Medication 1 CAPSULE: at 21:17

## 2023-08-04 RX ADMIN — APIXABAN 2.5 MG: 2.5 TABLET, FILM COATED ORAL at 21:17

## 2023-08-04 RX ADMIN — CETIRIZINE HYDROCHLORIDE 10 MG: 10 TABLET ORAL at 21:16

## 2023-08-04 RX ADMIN — CITALOPRAM 10 MG: 10 TABLET, FILM COATED ORAL at 21:16

## 2023-08-04 RX ADMIN — METOPROLOL TARTRATE 50 MG: 50 TABLET, FILM COATED ORAL at 21:17

## 2023-08-04 RX ADMIN — IPRATROPIUM BROMIDE AND ALBUTEROL SULFATE 6 ML: 2.5; .5 SOLUTION RESPIRATORY (INHALATION) at 12:34

## 2023-08-04 RX ADMIN — POTASSIUM CHLORIDE 10 MEQ: 750 TABLET, EXTENDED RELEASE ORAL at 21:16

## 2023-08-04 RX ADMIN — METHYLPREDNISOLONE SODIUM SUCCINATE 125 MG: 125 INJECTION, POWDER, FOR SOLUTION INTRAMUSCULAR; INTRAVENOUS at 11:54

## 2023-08-04 RX ADMIN — GUAIFENESIN 600 MG: 600 TABLET, EXTENDED RELEASE ORAL at 21:16

## 2023-08-04 NOTE — H&P
HISTORY AND PHYSICAL   [facility]        Date of Admission: 2023  Patient Identification:  Name: Agnieszka Rizzo  Age: 89 y.o.  Sex: female  :  1930  MRN: [id number]                     Primary Care Physician: Matt Rose MD    Chief Complaint:  89 year old female who presented to the emergency room with shortness of breath which started some time ago but was worse today; she has had a cough; no fever or chills; she has had some chest tightness which is new; she had felt weak and has had no energy; she feels better after steroids and lasix in the ED; a son is at the bedside    History of Present Illness:   As above    Past Medical History:  Past Medical History:   Diagnosis Date    Aspergillus     Asthma     Atrial fibrillation     chronic    Atrial flutter     Bronchiectasis     C. difficile diarrhea 2017    CAD (coronary artery disease)     nonobstructive    Chronic diastolic CHF (congestive heart failure)     Colitis     Cough     Cryoglobulinemia     Dyspnea on exertion     Fall     Hyperlipidemia     Hypertension     Hyponatremia     Hypoxia     Infectious viral hepatitis     AGE 13    Left shoulder pain     Leg swelling     Lesion of lung     Malignant hyperthermia due to anesthesia     Mild tricuspid regurgitation     MR (mitral regurgitation)     mild    MVP (mitral valve prolapse)     Permanent atrial fibrillation     Pneumonia with the fungal infection aspergillosis 2017    Pneumothorax     SOB (shortness of breath)     UTI (urinary tract infection)     Wheeze     mild     Past Surgical History:  Past Surgical History:   Procedure Laterality Date    BRONCHOSCOPY N/A 2016    Procedure: BRONCHOSCOPY WITH FLUORO, BRUSHINGS, BAL, AND BIOPSIES;  Surgeon: Rogelio Tucker MD;  Location: [facility] ENDOSCOPY;  Service:     BRONCHOSCOPY Bilateral 2017    Procedure: BRONCHOSCOPY with BAL ;  Surgeon: Sung King MD;  Location: [facility] ENDOSCOPY;  Service:      BRONCHOSCOPY N/A 12/17/2019    Procedure: BRONCHOSCOPY WITH WASHINGS;  Surgeon: Rogelio Tucker MD;  Location: [facility] ENDOSCOPY;  Service: Pulmonary    BRONCHOSCOPY N/A 07/15/2022    Procedure: BRONCHOSCOPY;  Surgeon: Rogelio Tucker MD;  Location: [facility] ENDOSCOPY;  Service: Pulmonary;  Laterality: N/A;  Pre: Pneumonia  Post: Pneumonia    CATARACT EXTRACTION EXTRACAPSULAR W/ INTRAOCULAR LENS IMPLANTATION      COLONOSCOPY      2013    D & C WITH SUCTION      HYSTERECTOMY      KNEE ARTHROSCOPY Left     Partial      Home Meds:  Medications Prior to Admission   Medication Sig Dispense Refill Last Dose    acetaminophen (TYLENOL) 325 MG tablet Take 2 tablets by mouth Every 4 (Four) Hours As Needed for Moderate Pain.   8/3/2023    albuterol sulfate  (90 Base) MCG/ACT inhaler Inhale 2 puffs Every 6 (Six) Hours As Needed for Wheezing or Shortness of Air. 8 g 11 8/4/2023    apixaban (ELIQUIS) 2.5 MG tablet tablet Take 1 tablet by mouth Every 12 (Twelve) Hours. Indications: Other - full anticoagulation 180 tablet 3 8/4/2023    azithromycin (ZITHROMAX) 250 MG tablet Take 1 tablet by mouth Daily.   8/4/2023    benzonatate (TESSALON) 200 MG capsule Take 1 capsule by mouth 3 (Three) Times a Day As Needed. Indications: Cough   8/4/2023    cholecalciferol (VITAMIN D3) 25 MCG (1000 UT) tablet Take 1 tablet by mouth Daily.   8/4/2023    citalopram (CeleXA) 10 MG tablet TAKE 1 TABLET DAILY (Patient taking differently: Take 1 tablet by mouth Daily.) 90 tablet 1 8/3/2023    FeroSul 325 (65 Fe) MG tablet TAKE ONE TABLET BY MOUTH DAILY WITH BREAKFAST (Patient taking differently: Take 1 tablet by mouth Daily.) 90 tablet 0 8/3/2023    fexofenadine (ALLEGRA) 180 MG tablet Take 1 tablet by mouth Daily.   8/3/2023    fluticasone (FLONASE) 50 MCG/ACT nasal spray 2 sprays into the nostril(s) as directed by provider At Night As Needed for Allergies.   8/3/2023    fluticasone-salmeterol (ADVAIR HFA) 115-21 MCG/ACT inhaler Inhale 2 puffs 2 (Two)  Times a Day.   8/3/2023    guaiFENesin (MUCINEX) 600 MG 12 hr tablet Take 1 tablet by mouth 2 (Two) Times a Day.   8/4/2023    ipratropium-albuterol (DUO-NEB) 0.5-2.5 mg/3 ml nebulizer Take 3 mL by nebulization 2 (Two) Times a Day.   8/3/2023    Lactobacillus (FLORAJEN ACIDOPHILUS) capsule Take 1 tablet by mouth Daily.   8/3/2023    metoprolol tartrate (LOPRESSOR) 50 MG tablet Take 1 tablet by mouth Every 12 (Twelve) Hours. 180 tablet 3 8/4/2023    Multiple Vitamins-Minerals (PRESERVISION AREDS PO) Take 1 tablet by mouth 2 (Two) Times a Day.   8/4/2023    potassium chloride (MICRO-K) 10 MEQ CR capsule Take 1 capsule by mouth Daily. 90 capsule 3 8/3/2023    sodium chloride 0.9 % nebulizer solution Take 3 mL by nebulization 2 (Two) Times a Day.   8/3/2023    torsemide (DEMADEX) 20 MG tablet Take 1 tablet by mouth 2 (Two) Times a Day. 180 tablet 3 8/4/2023    glucosamine-chondroitin 500-400 MG capsule capsule Take 1 capsule by mouth Daily.          Allergies:  Allergies   Allergen Reactions    Amlodipine Besylate Swelling    Aspirin GI Intolerance     Caused bleeding ulcers    Bactrim [Sulfamethoxazole-Trimethoprim] Nausea And Vomiting    Erythromycin Unknown (See Comments)     Patient does not recall type of reaction.    Levaquin [Levofloxacin] Unknown (See Comments)     Nausea      Nitrofurantoin Nausea Only and Nausea And Vomiting    Ramipril Other (See Comments)     Cough     Immunizations:  Immunization History   Administered Date(s) Administered    COVID-19 (PFIZER) Purple Cap Monovalent 01/30/2021, 02/20/2021, 09/09/2021    Flu Vaccine Intradermal Quad 18-64YR 10/01/2016    Fluad Quad 65+ 09/16/2020    Fluzone High Dose =>65 Years ([employer] ONLY) 10/01/2016, 09/13/2017, 10/01/2018    Pneumococcal Conjugate 13-Valent (PCV13) 10/01/2016    Pneumococcal Polysaccharide (PPSV23) 01/01/2000    Shingrix 03/12/2018, 08/01/2018    Tdap 07/12/2021     Social History:   Social History     Social History Narrative    Not on  "file     Social History     Socioeconomic History    Marital status:    Tobacco Use    Smoking status: Never    Smokeless tobacco: Never    Tobacco comments:     caffiene daily   Vaping Use    Vaping Use: Never used   Substance and Sexual Activity    Alcohol use: Yes     Alcohol/week: 2.0 standard drinks     Types: 1 Glasses of wine, 1 Shots of liquor per week     Comment: occasional/  Daily caffeine use    Drug use: No    Sexual activity: Defer     Partners: Male       Family History:  Family History   Problem Relation Age of Onset    Hypertension Mother     Stroke Mother     Heart disease Father     Hypertension Father     Cancer Brother     Cancer Son         Review of Systems  See history of present illness and past medical history.  Patient denies headache, dizziness, syncope, falls, trauma, change in vision, change in hearing, change in taste, changes in weight, changes in appetite, focal weakness, numbness, or paresthesia.  Patient denies chest pain, palpitations, dyspnea, orthopnea, PND, cough, sinus pressure, rhinorrhea, epistaxis, hemoptysis, nausea, vomiting,hematemesis, diarrhea, constipation or hematochezia.  Denies cold or heat intolerance, polydipsia, polyuria, polyphagia. Denies hematuria, pyuria, dysuria, hesitancy, frequency or urgency. Denies consumption of raw and under cooked meats foods or change in water source.  Denies fever, chills, sweats, night sweats.  Denies missing any routine medications. Remainder of ROS is negative.    Objective:  T Max 24 hrs: Temp (24hrs), Av.8 øF (37.1 øC), Min:97.7 øF (36.5 øC), Max:99.8 øF (37.7 øC)    Vitals Ranges:   Temp:  [97.7 øF (36.5 øC)-99.8 øF (37.7 øC)] 97.7 øF (36.5 øC)  Heart Rate:  [] 120  Resp:  [16-20] 20  BP: (135-153)/() 135/73      Exam:  /73 (BP Location: Right arm, Patient Position: Lying)   Pulse 120   Temp 97.7 øF (36.5 øC) (Oral)   Resp 20   Ht 160 cm (63\")   Wt 57.6 kg (127 lb)   SpO2 93%   BMI 22.50 " kg/mý     General Appearance:    Alert, cooperative, no distress, appears stated age   Head:    Normocephalic, without obvious abnormality, atraumatic   Eyes:    PERRL, conjunctivae/corneas clear, EOM's intact, both eyes   Ears:    Normal external ear canals, both ears   Nose:   Nares normal, septum midline, mucosa normal, no drainage    or sinus tenderness   Throat:   Lips, mucosa, and tongue normal   Neck:   Supple, symmetrical, trachea midline, no adenopathy;     thyroid:  no enlargement/tenderness/nodules; no carotid    bruit or JVD   Back:     Symmetric, no curvature, ROM normal, no CVA tenderness   Lungs:     Decreased breath sounds bilaterally, respirations unlabored   Chest Wall:    No tenderness or deformity    Heart:    Regular rate and rhythm, S1 and S2 normal, no murmur, rub   or gallop   Abdomen:     Soft, nontender, bowel sounds active all four quadrants,     no masses, no hepatomegaly, no splenomegaly   Extremities:   Extremities normal, atraumatic, no cyanosis or edema   Pulses:   2+ and symmetric all extremities   Skin:   Skin color, texture, turgor normal, no rashes or lesions               .    Data Review:  Labs in chart were reviewed.  WBC   Date Value Ref Range Status   08/04/2023 10.33 3.40 - 10.80 10*3/mm3 Final     Hemoglobin   Date Value Ref Range Status   08/04/2023 11.8 (L) 12.0 - 15.9 g/dL Final     Hematocrit   Date Value Ref Range Status   08/04/2023 35.5 34.0 - 46.6 % Final     Platelets   Date Value Ref Range Status   08/04/2023 285 140 - 450 10*3/mm3 Final     Sodium   Date Value Ref Range Status   08/04/2023 138 136 - 145 mmol/L Final     Potassium   Date Value Ref Range Status   08/04/2023 4.3 3.5 - 5.2 mmol/L Final     Chloride   Date Value Ref Range Status   08/04/2023 103 98 - 107 mmol/L Final     CO2   Date Value Ref Range Status   08/04/2023 23.8 22.0 - 29.0 mmol/L Final     BUN   Date Value Ref Range Status   08/04/2023 10 8 - 23 mg/dL Final     Creatinine   Date Value Ref  Range Status   08/04/2023 0.79 0.57 - 1.00 mg/dL Final     Glucose   Date Value Ref Range Status   08/04/2023 125 (H) 65 - 99 mg/dL Final     Calcium   Date Value Ref Range Status   08/04/2023 9.4 8.2 - 9.6 mg/dL Final                Imaging Results (All)       Procedure Component Value Units Date/Time    XR Chest 1 View [[id number]] Collected: 08/04/23 1200     Updated: 08/04/23 1204    Narrative:      PORTABLE CHEST X-RAY     HISTORY: Shortness of breath.     TECHNIQUE: Portable chest x-ray is correlated with chest x-ray  04/08/2023.     FINDINGS: There is cardiomegaly with central pulmonary vascular  engorgement and interstitial edema. No pleural effusion. Atelectasis at  the right lung base. Old healed left clavicle fracture.       Impression:      Heart failure or volume overload with interstitial edema.     This report was finalized on 8/4/2023 12:01 PM by Dr. Geovanny Pollard M.D.                 Assessment:  Active Hospital Problems    Diagnosis  POA    **Acute pulmonary edema [J81.0]  Yes      Resolved Hospital Problems   No resolved problems to display.   Acute on chronic diastolic chf  Bronchiectasis  Asthma  A fib  Cad  Hypertension  hyperglycemia    Plan:  Repeat troponin  Ask cardiology to see her  Repeat echo  Continue diuresis  Ask pulmonary to see her  Monitor on telemetry  Trend labs  Dw patient and ed provider    Carlene Mooney MD  8/4/2023  18:12 EDT

## 2023-08-04 NOTE — CASE MANAGEMENT/SOCIAL WORK
Continued Stay Note  [facility]     Patient Name: Agnieszka Rizzo  MRN: [id number]  Today's Date: 8/4/2023    Admit Date: 8/4/2023    Plan: Plan home.   ADELAIDE Aleman RN   Discharge Plan       Row Name 08/04/23 2231       Plan    Plan Plan home.   ADELAIDE Aleman RN    Patient/Family in Agreement with Plan yes    Plan Comments FACE SHEET VERIFIED/ IM LETTER SIGNED.   Spoke with pt and pt's son ( Handy Rizzo [phone]) at bedside.  Pt's PCP is Dr. Matt Rose.  Pt lives alone in a single story patio home.  Pt is independent with ADLs.  Pt has a bath bench, cane, grab bar, nebulizer, shower chair and rolling walker for home use. Pt is on home O2 supplied by [employer].  Pt is not current with HH.  Pt  has been in [place] for skilled care.  Pt denies any discharge needs at this time.  Pt's daughter ( Michelle Zaldivar [phone]) and son (Handy [phone]) will assist pt at home and transport pt home.  Plan home.  ADELAIDE Aleman RN                   Discharge Codes    No documentation.                       Agata Aleman RN

## 2023-08-04 NOTE — PLAN OF CARE
Goal Outcome Evaluation:              Outcome Evaluation: Admitted to room 665 from ED. Denies pain. States is not SOA while resting in bed. States is mildly SOA with activity. IV antx given. Instructed on sputum spec. Room air. Telemetry a-fib.

## 2023-08-04 NOTE — ED NOTES
"Pt is soa.  She has been \"gasping\" for air.  She has been on abx and steroids but is not improving  "

## 2023-08-04 NOTE — ED PROVIDER NOTES
EMERGENCY DEPARTMENT ENCOUNTER    Room Number:  02/02  Date of encounter:  8/4/2023  PCP: Matt Rose MD  Historian: Patient  Full history not obtainable due to: None    HPI:  Chief Complaint: SOA    Context: Agnieszka Rizzo is a 89 y.o. female with a PMH significant for hypertension, hyperlipidemia, mitral valve prolapse, atrial fibrillation anticoagulated on Eliquis, CAD, asthma, tricuspid regurgitation who presents to the ED c/o shortness of air.  The patient saw her pulmonologist in the office a couple of weeks ago and was placed on a course of antibiotics and steroids.  Her symptoms had improved somewhat but after completing her course of medications her symptoms have only worsened.  She now presents with exertional dyspnea, cough which is nonproductive.  She denies fever, nausea, vomiting.  She does admit to a tightness across her chest.  No sick contacts at home.      MEDICAL RECORD REVIEW:    Upon review of the medical record it appears the patient was evaluated in the office with ophthalmology on 7/31/2023 for macular degeneration.  She had a normal procalcitonin on 4/8/2023 and a normal procalcitonin on 4/7/2023.    PAST MEDICAL HISTORY    Active Ambulatory Problems     Diagnosis Date Noted    Primary hypertension 12/01/2016    Chronic coronary artery disease 12/01/2016    Familial hypercholesterolemia 12/01/2016    Mitral valve insufficiency 12/01/2016    Ventricular premature beats 12/01/2016    Ventricular tachycardia 12/01/2016    Acute on chronic respiratory failure with hypoxia 01/09/2017    Acid-fast bacteria present 01/09/2017    DNR (do not resuscitate) 03/12/2017    Chronic diastolic CHF (congestive heart failure) 03/12/2017    Asymptomatic bacteriuria 04/03/2017    Iron deficiency anemia 04/04/2017    Hypokalemia 04/04/2017    Bronchiectasis 04/17/2017    Pneumonia of both lungs due to infectious organism 05/31/2017    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    Paroxysmal atrial  fibrillation 06/09/2017    Anxiety 06/20/2017    Exposure to hepatitis A 04/20/2018    Medicare annual wellness visit, subsequent 04/18/2019    Abdominal pain 09/23/2019    Herpes infection 11/19/2019    Moderate asthma with acute exacerbation 12/02/2019    Bronchitis, acute, with bronchospasm 12/16/2019    Abnormal EKG 12/16/2019    Fall     Laceration of left upper extremity 07/16/2021    Multiple skin tears 07/16/2021    Alteration in anticoagulation     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    Clinical diagnosis of COVID-19 01/04/2022    Pneumonia of right lung due to infectious organism, unspecified part of lung 07/09/2022    COPD (chronic obstructive pulmonary disease)     Dizziness 09/09/2022    Bronchiectasis 02/20/2023    Sepsis due to pneumonia 04/03/2023     Resolved Ambulatory Problems     Diagnosis Date Noted    Pneumothorax of right lung after biopsy 11/12/2016    Pulmonary aspergillosis 11/16/2016    Heart failure, diastolic, with acute decompensation 12/01/2016    Persistent atrial fibrillation 12/01/2016    Pneumonia 01/01/2017    Pneumonia with the fungal infection aspergillosis 01/06/2017    Hyponatremia 02/08/2017    C. difficile colitis 03/11/2017    Sepsis 03/12/2017    CHF (congestive heart failure) (HCC) 03/22/2017    Pancolitis 04/02/2017    Acute bronchitis due to parainfluenza virus 12/16/2019    COPD with acute exacerbation  12/16/2019    UTI (urinary tract infection) 12/19/2019     Past Medical History:   Diagnosis Date    Aspergillus     Asthma     Atrial fibrillation     Atrial flutter     C. difficile diarrhea 03/11/2017    CAD (coronary artery disease)     Colitis     Cough     Cryoglobulinemia     Dyspnea on exertion     Hyperlipidemia     Hypertension     Hypoxia     Infectious viral hepatitis     Left shoulder pain     Leg swelling     Lesion of lung     Malignant hyperthermia due to anesthesia     Mild tricuspid regurgitation     MR (mitral regurgitation)     MVP (mitral valve prolapse)      Permanent atrial fibrillation     Pneumothorax     SOB (shortness of breath)     Wheeze          PAST SURGICAL HISTORY  Past Surgical History:   Procedure Laterality Date    BRONCHOSCOPY N/A 11/12/2016    Procedure: BRONCHOSCOPY WITH FLUORO, BRUSHINGS, BAL, AND BIOPSIES;  Surgeon: Rogelio Tucker MD;  Location: [facility] ENDOSCOPY;  Service:     BRONCHOSCOPY Bilateral 6/3/2017    Procedure: BRONCHOSCOPY with BAL ;  Surgeon: Sung King MD;  Location: [facility] ENDOSCOPY;  Service:     BRONCHOSCOPY N/A 12/17/2019    Procedure: BRONCHOSCOPY WITH WASHINGS;  Surgeon: Rogelio Tucker MD;  Location: [facility] ENDOSCOPY;  Service: Pulmonary    BRONCHOSCOPY N/A 7/15/2022    Procedure: BRONCHOSCOPY;  Surgeon: Rogelio Tucker MD;  Location: [facility] ENDOSCOPY;  Service: Pulmonary;  Laterality: N/A;  Pre: Pneumonia  Post: Pneumonia    CATARACT EXTRACTION EXTRACAPSULAR W/ INTRAOCULAR LENS IMPLANTATION      COLONOSCOPY      2013    D & C WITH SUCTION      HYSTERECTOMY      KNEE ARTHROSCOPY Left          FAMILY HISTORY  Family History   Problem Relation Age of Onset    Hypertension Mother     Stroke Mother     Hypertension Father     Cancer Son     Cancer Brother          SOCIAL HISTORY  Social History     Socioeconomic History    Marital status:    Tobacco Use    Smoking status: Never    Smokeless tobacco: Never    Tobacco comments:     caffiene daily   Vaping Use    Vaping Use: Never used   Substance and Sexual Activity    Alcohol use: Yes     Alcohol/week: 2.0 standard drinks     Types: 1 Glasses of wine, 1 Shots of liquor per week     Comment: occasional/  Daily caffeine use    Drug use: No    Sexual activity: Yes     Partners: Male         ALLERGIES  Amlodipine besylate, Aspirin, Bactrim [sulfamethoxazole-trimethoprim], Erythromycin, Levaquin [levofloxacin], Nitrofurantoin, and Ramipril        REVIEW OF SYSTEMS    All systems reviewed and marked as negative except as listed in HPI     PHYSICAL EXAM    I have reviewed the triage vital  signs and nursing notes.    ED Triage Vitals [08/04/23 1130]   Temp Heart Rate Resp BP SpO2   99.8 øF (37.7 øC) 107 16 -- 92 %      Temp src Heart Rate Source Patient Position BP Location FiO2 (%)   Tympanic Monitor -- -- --       Physical Exam  Constitutional:       General: She is not in acute distress.     Appearance: She is well-developed.   HENT:      Head: Normocephalic and atraumatic.   Eyes:      General: No scleral icterus.     Conjunctiva/sclera: Conjunctivae normal.   Neck:      Trachea: No tracheal deviation.   Cardiovascular:      Rate and Rhythm: Regular rhythm. Tachycardia present.   Pulmonary:      Effort: Pulmonary effort is normal.      Breath sounds: Wheezing present.      Comments: Mildly increased work of breathing, speaks in broken sentences.  Abdominal:      Palpations: Abdomen is soft.      Tenderness: There is no abdominal tenderness.   Musculoskeletal:         General: No deformity.      Cervical back: Normal range of motion.   Lymphadenopathy:      Cervical: No cervical adenopathy.   Skin:     General: Skin is warm and dry.   Neurological:      Mental Status: She is alert and oriented to person, place, and time.   Psychiatric:         Behavior: Behavior normal.       Vital signs and nursing notes reviewed.            LAB RESULTS  Recent Results (from the past 24 hour(s))   ECG 12 Lead Dyspnea    Collection Time: 08/04/23 11:44 AM   Result Value Ref Range    QT Interval 359 ms   Respiratory Panel PCR w/COVID-19(SARS-CoV-2) ANAI/JORGE/THOM/PAD/COR/MAD/NIKHIL In-House, NP Swab in UTM/[facility], 3-4 HR TAT - Swab, Nasopharynx    Collection Time: 08/04/23 11:45 AM    Specimen: Nasopharynx; Swab   Result Value Ref Range    ADENOVIRUS, PCR Not Detected Not Detected    Coronavirus 229E Not Detected Not Detected    Coronavirus HKU1 Not Detected Not Detected    Coronavirus NL63 Not Detected Not Detected    Coronavirus OC43 Not Detected Not Detected    COVID19 Not Detected Not Detected - Ref. Range    Human  Metapneumovirus Not Detected Not Detected    Human Rhinovirus/Enterovirus Not Detected Not Detected    Influenza A PCR Not Detected Not Detected    Influenza B PCR Not Detected Not Detected    Parainfluenza Virus 1 Not Detected Not Detected    Parainfluenza Virus 2 Not Detected Not Detected    Parainfluenza Virus 3 Not Detected Not Detected    Parainfluenza Virus 4 Not Detected Not Detected    RSV, PCR Not Detected Not Detected    Bordetella pertussis pcr Not Detected Not Detected    Bordetella parapertussis PCR Not Detected Not Detected    Chlamydophila pneumoniae PCR Not Detected Not Detected    Mycoplasma pneumo by PCR Not Detected Not Detected   Comprehensive Metabolic Panel    Collection Time: 08/04/23 11:52 AM    Specimen: Blood   Result Value Ref Range    Glucose 125 (H) 65 - 99 mg/dL    BUN 10 8 - 23 mg/dL    Creatinine 0.79 0.57 - 1.00 mg/dL    Sodium 138 136 - 145 mmol/L    Potassium 4.3 3.5 - 5.2 mmol/L    Chloride 103 98 - 107 mmol/L    CO2 23.8 22.0 - 29.0 mmol/L    Calcium 9.4 8.2 - 9.6 mg/dL    Total Protein 6.7 6.0 - 8.5 g/dL    Albumin 3.5 3.5 - 5.2 g/dL    ALT (SGPT) 35 (H) 1 - 33 U/L    AST (SGOT) 22 1 - 32 U/L    Alkaline Phosphatase 113 39 - 117 U/L    Total Bilirubin 1.2 0.0 - 1.2 mg/dL    Globulin 3.2 gm/dL    A/G Ratio 1.1 g/dL    BUN/Creatinine Ratio 12.7 7.0 - 25.0    Anion Gap 11.2 5.0 - 15.0 mmol/L    eGFR 70.3 >60.0 mL/min/1.73   BNP    Collection Time: 08/04/23 11:52 AM    Specimen: Blood   Result Value Ref Range    proBNP 3,720.0 (H) 0.0 - 1,800.0 pg/mL   Single High Sensitivity Troponin T    Collection Time: 08/04/23 11:52 AM    Specimen: Blood   Result Value Ref Range    HS Troponin T 22 (H) <10 ng/L   Lactic Acid, Plasma    Collection Time: 08/04/23 11:52 AM    Specimen: Blood   Result Value Ref Range    Lactate 1.4 0.5 - 2.0 mmol/L   Procalcitonin    Collection Time: 08/04/23 11:52 AM    Specimen: Blood   Result Value Ref Range    Procalcitonin 0.05 0.00 - 0.25 ng/mL   Green Top  (Gel)    Collection Time: 08/04/23 11:52 AM   Result Value Ref Range    Extra Tube Hold for add-ons.    Lavender Top    Collection Time: 08/04/23 11:52 AM   Result Value Ref Range    Extra Tube hold for add-on    Gold Top - SST    Collection Time: 08/04/23 11:52 AM   Result Value Ref Range    Extra Tube Hold for add-ons.    Light Blue Top    Collection Time: 08/04/23 11:52 AM   Result Value Ref Range    Extra Tube Hold for add-ons.    CBC Auto Differential    Collection Time: 08/04/23 11:52 AM    Specimen: Blood   Result Value Ref Range    WBC 10.33 3.40 - 10.80 10*3/mm3    RBC 3.68 (L) 3.77 - 5.28 10*6/mm3    Hemoglobin 11.8 (L) 12.0 - 15.9 g/dL    Hematocrit 35.5 34.0 - 46.6 %    MCV 96.5 79.0 - 97.0 fL    MCH 32.1 26.6 - 33.0 pg    MCHC 33.2 31.5 - 35.7 g/dL    RDW 13.2 12.3 - 15.4 %    RDW-SD 47.2 37.0 - 54.0 fl    MPV 9.8 6.0 - 12.0 fL    Platelets 285 140 - 450 10*3/mm3    Neutrophil % 69.1 42.7 - 76.0 %    Lymphocyte % 17.7 (L) 19.6 - 45.3 %    Monocyte % 10.4 5.0 - 12.0 %    Eosinophil % 1.5 0.3 - 6.2 %    Basophil % 0.4 0.0 - 1.5 %    Immature Grans % 0.9 (H) 0.0 - 0.5 %    Neutrophils, Absolute 7.15 (H) 1.70 - 7.00 10*3/mm3    Lymphocytes, Absolute 1.83 0.70 - 3.10 10*3/mm3    Monocytes, Absolute 1.07 (H) 0.10 - 0.90 10*3/mm3    Eosinophils, Absolute 0.15 0.00 - 0.40 10*3/mm3    Basophils, Absolute 0.04 0.00 - 0.20 10*3/mm3    Immature Grans, Absolute 0.09 (H) 0.00 - 0.05 10*3/mm3    nRBC 0.0 0.0 - 0.2 /100 WBC       Ordered the above labs and independently reviewed the results.        RADIOLOGY  XR Chest 1 View    Result Date: 8/4/2023  PORTABLE CHEST X-RAY  HISTORY: Shortness of breath.  TECHNIQUE: Portable chest x-ray is correlated with chest x-ray 04/08/2023.  FINDINGS: There is cardiomegaly with central pulmonary vascular engorgement and interstitial edema. No pleural effusion. Atelectasis at the right lung base. Old healed left clavicle fracture.      Heart failure or volume overload with  interstitial edema.  This report was finalized on 8/4/2023 12:01 PM by Dr. Geovanny Pollard M.D.       I ordered the above noted radiological studies. Independently reviewed by me and discussed with radiologist.  See dictation above for official radiology interpretation.      PROCEDURES    Procedures        MEDICATIONS GIVEN IN ER    Medications   sodium chloride 0.9 % flush 10 mL (has no administration in time range)   ipratropium-albuterol (DUO-NEB) nebulizer solution 6 mL (6 mL Nebulization Given 8/4/23 1234)   methylPREDNISolone sodium succinate (SOLU-Medrol) injection 125 mg (125 mg Intravenous Given 8/4/23 1154)   furosemide (LASIX) injection 40 mg (40 mg Intravenous Given 8/4/23 1251)         PROGRESS, DATA ANALYSIS, CONSULTS, AND MEDICAL DECISION MAKING    All labs have been independently interpreted by me.  All radiology studies have been interpreted by me.  Discussion below represents my analysis of pertinent findings related to patient's condition, differential diagnosis, treatment plan and final disposition.    Patient presentation and evaluation consistent with acute pulmonary edema without a confirmed diagnosis of congestive heart failure.  Due to the severity of her symptoms I plan to admit to the hospital for IV diuresis and further work-up with probable echocardiogram.  Patient agreeable.  All questions answered.    - Chronic or social conditions impacting care: None      DIFFERENTIAL DIAGNOSIS INCLUDE BUT NOT LIMITED TO:     Differential diagnosis includes but is not limited to:  -COVID  -CHF  -acute coronary syndrome  -pulmonary embolism  -pneumothorax  -pneumonia  -asthma/COPD  -deconditioning  -anemia  -anxiety       Orders placed during this visit:  Orders Placed This Encounter   Procedures    Blood Culture - Blood,    Blood Culture - Blood,    Respiratory Panel PCR w/COVID-19(SARS-CoV-2) ANAI/JORGE/THOM/PAD/COR/MAD/NIKHIL In-House, NP Swab in UTM/VTM, 3-4 HR TAT - Swab, Nasopharynx    XR Chest 1 View     [place] Draw    Comprehensive Metabolic Panel    BNP    Single High Sensitivity Troponin T    Lactic Acid, Plasma    Procalcitonin    CBC Auto Differential    Continuous Pulse Oximetry    Vital Signs    [facility] (on-call MD unless specified) Details    Oxygen Therapy- Nasal Cannula; Titrate 1-6 LPM Per SpO2; 90 - 95%    ECG 12 Lead Dyspnea    ECG 12 Lead Dyspnea    Insert Peripheral IV    Inpatient Admission    CBC & Differential    Green Top (Gel)    Lavender Top    Gold Top - SST    Light Blue Top         ED Course as of 08/04/23 1334   Fri Aug 04, 2023   1253 Per my independent interpretation of the chest x-ray, there is no obvious pneumothorax. [DC]   1321 I discussed the case with MD Vinicio with [facility] at this time regarding the patient.  I discussed work-up, results, concerns.  I discussed the consulting provider's desire for med surg admit.   [DC]      ED Course User Index  [DC] Homero Christianson PA       AS OF 13:34 EDT VITALS:    BP - 150/90  HR - 91  TEMP - 99.8 øF (37.7 øC) (Tympanic)  02 SATS - 92%      1335 I rechecked the patient.  I discussed the patient's labs, radiology findings (including all incidental findings), diagnosis, and plan for admission. The patient understands and agrees with the plan.      DIAGNOSIS  Final diagnoses:   Acute pulmonary edema   RAMOS (dyspnea on exertion)         DISPOSITION  Admit    Pt masked in first look. I wore a surgical mask throughout my encounters with the pt. I performed hand hygiene on entry into the pt room and upon exit.     Dictated utilizing Dragon dictation     Note Disclaimer: At [facility], we believe that sharing information builds trust and better relationships. You are receiving this note because you recently visited [facility]. It is possible you will see health information before a provider has talked with you about it. This kind of information can be easy to misunderstand. To help you fully understand what it means for your health, we urge you  to discuss this note with your provider.      Homero Christianson PA  08/04/23 [id number]

## 2023-08-04 NOTE — PROGRESS NOTES
Clinical Pharmacy Services: Medication History    Agnieszka Rizzo is a 89 y.o. female presenting to [facility] for   Chief Complaint   Patient presents with    Shortness of Breath       She  has a past medical history of Aspergillus, Asthma, Atrial fibrillation, Atrial flutter, Bronchiectasis, C. difficile diarrhea (03/11/2017), CAD (coronary artery disease), Chronic diastolic CHF (congestive heart failure), Colitis, COPD (chronic obstructive pulmonary disease), Cough, Cryoglobulinemia, Dyspnea on exertion, Fall, Hyperlipidemia, Hypertension, Hyponatremia, Hypoxia, Infectious viral hepatitis, Left shoulder pain, Leg swelling, Lesion of lung, Malignant hyperthermia due to anesthesia, Mild tricuspid regurgitation, MR (mitral regurgitation), MVP (mitral valve prolapse), Permanent atrial fibrillation, Pneumonia with the fungal infection aspergillosis (01/06/2017), Pneumothorax, SOB (shortness of breath), UTI (urinary tract infection), and Wheeze.    Allergies as of 08/04/2023 - Reviewed 08/04/2023   Allergen Reaction Noted    Amlodipine besylate Swelling 11/12/2012    Aspirin GI Intolerance 11/10/2015    Bactrim [sulfamethoxazole-trimethoprim] Nausea And Vomiting 02/08/2017    Erythromycin Unknown (See Comments) 03/11/2013    Levaquin [levofloxacin] Unknown (See Comments) 03/11/2013    Nitrofurantoin Nausea Only and Nausea And Vomiting 05/21/2014    Ramipril Other (See Comments) 11/12/2012       Medication information was obtained from: Patient   Pharmacy and Phone Number:     Prior to Admission Medications       Prescriptions Last Dose Informant Patient Reported? Taking?    acetaminophen (TYLENOL) 325 MG tablet 8/3/2023 Self Yes Yes    Take 2 tablets by mouth Every 4 (Four) Hours As Needed for Moderate Pain.    albuterol sulfate  (90 Base) MCG/ACT inhaler 8/4/2023 Self No Yes    Inhale 2 puffs Every 6 (Six) Hours As Needed for Wheezing or Shortness of Air.    apixaban (ELIQUIS) 2.5 MG tablet tablet  8/4/2023 Self No Yes    Take 1 tablet by mouth Every 12 (Twelve) Hours. Indications: Other - full anticoagulation    azithromycin (ZITHROMAX) 250 MG tablet 8/4/2023 Self Yes Yes    Take 1 tablet by mouth Daily.    benzonatate (TESSALON) 200 MG capsule 8/4/2023 Self Yes Yes    Take 1 capsule by mouth 3 (Three) Times a Day As Needed. Indications: Cough    cholecalciferol (VITAMIN D3) 25 MCG (1000 UT) tablet 8/4/2023 Self Yes Yes    Take 1 tablet by mouth Daily.    citalopram (CeleXA) 10 MG tablet 8/3/2023 Self No Yes    TAKE 1 TABLET DAILY    Patient taking differently:  Take 1 tablet by mouth Daily.    FeroSul 325 (65 Fe) MG tablet 8/3/2023 Self No Yes    TAKE ONE TABLET BY MOUTH DAILY WITH BREAKFAST    Patient taking differently:  Take 1 tablet by mouth Daily.    fexofenadine (ALLEGRA) 180 MG tablet 8/3/2023 Self Yes Yes    Take 1 tablet by mouth Daily.    fluticasone (FLONASE) 50 MCG/ACT nasal spray 8/3/2023 Self Yes Yes    2 sprays into the nostril(s) as directed by provider At Night As Needed for Allergies.    fluticasone-salmeterol (ADVAIR HFA) 115-21 MCG/ACT inhaler 8/3/2023 Self Yes Yes    Inhale 2 puffs 2 (Two) Times a Day.    guaiFENesin (MUCINEX) 600 MG 12 hr tablet 8/4/2023 Self Yes Yes    Take 1 tablet by mouth 2 (Two) Times a Day.    ipratropium-albuterol (DUO-NEB) 0.5-2.5 mg/3 ml nebulizer 8/3/2023 Self Yes Yes    Take 3 mL by nebulization 2 (Two) Times a Day.    Lactobacillus (FLORAJEN ACIDOPHILUS) capsule 8/3/2023 Self Yes Yes    Take 1 tablet by mouth Daily.    metoprolol tartrate (LOPRESSOR) 50 MG tablet 8/4/2023 Self No Yes    Take 1 tablet by mouth Every 12 (Twelve) Hours.    Multiple Vitamins-Minerals (PRESERVISION AREDS PO) 8/4/2023 Self Yes Yes    Take 1 tablet by mouth 2 (Two) Times a Day.    potassium chloride (MICRO-K) 10 MEQ CR capsule 8/3/2023 Self No Yes    Take 1 capsule by mouth Daily.    sodium chloride 0.9 % nebulizer solution 8/3/2023 Self Yes Yes    Take 3 mL by nebulization 2 (Two)  Times a Day.    torsemide (DEMADEX) 20 MG tablet 8/4/2023 Self No Yes    Take 1 tablet by mouth 2 (Two) Times a Day.    glucosamine-chondroitin 500-400 MG capsule capsule  Self Yes No    Take 1 capsule by mouth Daily.              Medication notes:     This medication list is complete to the best of my knowledge as of 8/4/2023    Please call if questions.    Alex Jackson  Medication History Technician  [phone]    8/4/2023 13:55 EDT

## 2023-08-04 NOTE — CONSULTS
CONSULT NOTE    Patient Identification:  Agnieszka Rizzo  89 y.o.  female  8/7/1930  [id number]            Requesting physician: Dr. Mooney    Reason for Consultation:  shortness of breath    CC: shortness of breath    History of Present Illness:  Agnieszka Rizzo is a 89-year-old female with a past medical history of Mycobacterium avium intracellular status post treatment, bronchiectasis, moderate asthma, and nocturnal hypoxia.  She also has persistent atrial fibrillation on chronic anticoagulation with apixaban, chronic diastolic heart failure, mitral valve prolapse, and nonobstructive coronary artery disease.    She follows with Dr. Tucker in the office for management of bronchiectasis and moderate asthma.  She is maintained on daily azithromycin, Advair twice daily, guaifenesin twice daily, and hypertonic saline nebulizer twice daily.  She also utilizes vest and flutter valve with treatments.  Uses 2 L of oxygen by nasal cannula with sleep.    Presented to the emergency department on 8/4/2023 with complaints of shortness of air.  Patient reports unchanged, productive cough.  Denies fever, chills, sick contacts, leg swelling.  Reports she developed some chest tightness overnight and noticed wheezing and dyspnea with exertion.  She also reports that she has felt generally weak over the past several days.  Wheezing was unrelieved by patient's as needed albuterol, and therefore she presented to the ED for further evaluation.  ED evaluation revealed high-sensitivity troponin 22, proBNP of 3720, lactate was 1.4, procalcitonin 0.05, WBC 10.33.  Respiratory viral panel was negative.  Chest x-ray showed cardiomegaly and interstitial edema, suggestive of fluid overload.  Patient received 40 mg IV Lasix, DuoNeb treatment, and 125 mg of Solu-Medrol IV in the ER with improvement of shortness of breath.    Review of Systems:  Constitutional: Negative for fever, chills, activity change.  Positive for weakness.  HEENT:  Negative for congestion, sinus pain or pressure.  Respiratory: Positive for chest tightness, cough, shortness of breath and wheezing.  Cardiac: Negative for chest pain, leg swelling.  Positive for palpitations.  GI: Negative for pain, constipation, diarrhea, nausea or vomiting.  : Negative for dysuria, frequency, urgency or flank pain.  Musculoskeletal: Negative for arthralgias or myalgias.  Skin: Negative for color change, pallor, rash or wound.  Neurological: Negative for dizziness, headache, lightheadedness or weakness.    Past Medical History:  Past Medical History:   Diagnosis Date    Aspergillus     Asthma     Atrial fibrillation     chronic    Atrial flutter     Bronchiectasis     C. difficile diarrhea 03/11/2017    CAD (coronary artery disease)     nonobstructive    Chronic diastolic CHF (congestive heart failure)     Colitis     Cough     Cryoglobulinemia     Dyspnea on exertion     Fall     Hyperlipidemia     Hypertension     Hyponatremia     Hypoxia     Infectious viral hepatitis     AGE 13    Left shoulder pain     Leg swelling     Lesion of lung     Malignant hyperthermia due to anesthesia     Mild tricuspid regurgitation     MR (mitral regurgitation)     mild    MVP (mitral valve prolapse)     Permanent atrial fibrillation     Pneumonia with the fungal infection aspergillosis 01/06/2017    Pneumothorax     SOB (shortness of breath)     UTI (urinary tract infection)     Wheeze     mild       Past Surgical History:  Past Surgical History:   Procedure Laterality Date    BRONCHOSCOPY N/A 11/12/2016    Procedure: BRONCHOSCOPY WITH FLUORO, BRUSHINGS, BAL, AND BIOPSIES;  Surgeon: Rogelio Tucker MD;  Location: [facility] ENDOSCOPY;  Service:     BRONCHOSCOPY Bilateral 06/03/2017    Procedure: BRONCHOSCOPY with BAL ;  Surgeon: Sung King MD;  Location: [facility] ENDOSCOPY;  Service:     BRONCHOSCOPY N/A 12/17/2019    Procedure: BRONCHOSCOPY WITH WASHINGS;  Surgeon: Rogelio Tucker MD;  Location: [facility] ENDOSCOPY;   Service: Pulmonary    BRONCHOSCOPY N/A 07/15/2022    Procedure: BRONCHOSCOPY;  Surgeon: Rogelio Tucker MD;  Location: [facility] ENDOSCOPY;  Service: Pulmonary;  Laterality: N/A;  Pre: Pneumonia  Post: Pneumonia    CATARACT EXTRACTION EXTRACAPSULAR W/ INTRAOCULAR LENS IMPLANTATION      COLONOSCOPY      2013    D & C WITH SUCTION      HYSTERECTOMY      KNEE ARTHROSCOPY Left     Partial        Home Meds:  Medications Prior to Admission   Medication Sig Dispense Refill Last Dose    acetaminophen (TYLENOL) 325 MG tablet Take 2 tablets by mouth Every 4 (Four) Hours As Needed for Moderate Pain.   8/3/2023    albuterol sulfate  (90 Base) MCG/ACT inhaler Inhale 2 puffs Every 6 (Six) Hours As Needed for Wheezing or Shortness of Air. 8 g 11 8/4/2023    apixaban (ELIQUIS) 2.5 MG tablet tablet Take 1 tablet by mouth Every 12 (Twelve) Hours. Indications: Other - full anticoagulation 180 tablet 3 8/4/2023    azithromycin (ZITHROMAX) 250 MG tablet Take 1 tablet by mouth Daily.   8/4/2023    benzonatate (TESSALON) 200 MG capsule Take 1 capsule by mouth 3 (Three) Times a Day As Needed. Indications: Cough   8/4/2023    cholecalciferol (VITAMIN D3) 25 MCG (1000 UT) tablet Take 1 tablet by mouth Daily.   8/4/2023    citalopram (CeleXA) 10 MG tablet TAKE 1 TABLET DAILY (Patient taking differently: Take 1 tablet by mouth Daily.) 90 tablet 1 8/3/2023    FeroSul 325 (65 Fe) MG tablet TAKE ONE TABLET BY MOUTH DAILY WITH BREAKFAST (Patient taking differently: Take 1 tablet by mouth Daily.) 90 tablet 0 8/3/2023    fexofenadine (ALLEGRA) 180 MG tablet Take 1 tablet by mouth Daily.   8/3/2023    fluticasone (FLONASE) 50 MCG/ACT nasal spray 2 sprays into the nostril(s) as directed by provider At Night As Needed for Allergies.   8/3/2023    fluticasone-salmeterol (ADVAIR HFA) 115-21 MCG/ACT inhaler Inhale 2 puffs 2 (Two) Times a Day.   8/3/2023    guaiFENesin (MUCINEX) 600 MG 12 hr tablet Take 1 tablet by mouth 2 (Two) Times a Day.   8/4/2023     ipratropium-albuterol (DUO-NEB) 0.5-2.5 mg/3 ml nebulizer Take 3 mL by nebulization 2 (Two) Times a Day.   8/3/2023    Lactobacillus (FLORAJEN ACIDOPHILUS) capsule Take 1 tablet by mouth Daily.   8/3/2023    metoprolol tartrate (LOPRESSOR) 50 MG tablet Take 1 tablet by mouth Every 12 (Twelve) Hours. 180 tablet 3 8/4/2023    Multiple Vitamins-Minerals (PRESERVISION AREDS PO) Take 1 tablet by mouth 2 (Two) Times a Day.   8/4/2023    potassium chloride (MICRO-K) 10 MEQ CR capsule Take 1 capsule by mouth Daily. 90 capsule 3 8/3/2023    sodium chloride 0.9 % nebulizer solution Take 3 mL by nebulization 2 (Two) Times a Day.   8/3/2023    torsemide (DEMADEX) 20 MG tablet Take 1 tablet by mouth 2 (Two) Times a Day. 180 tablet 3 8/4/2023    glucosamine-chondroitin 500-400 MG capsule capsule Take 1 capsule by mouth Daily.          Allergies:  Allergies   Allergen Reactions    Amlodipine Besylate Swelling    Aspirin GI Intolerance     Caused bleeding ulcers    Bactrim [Sulfamethoxazole-Trimethoprim] Nausea And Vomiting    Erythromycin Unknown (See Comments)     Patient does not recall type of reaction.    Levaquin [Levofloxacin] Unknown (See Comments)     Nausea      Nitrofurantoin Nausea Only and Nausea And Vomiting    Ramipril Other (See Comments)     Cough       Social History:   Social History     Socioeconomic History    Marital status:    Tobacco Use    Smoking status: Never    Smokeless tobacco: Never    Tobacco comments:     caffiene daily   Vaping Use    Vaping Use: Never used   Substance and Sexual Activity    Alcohol use: Yes     Alcohol/week: 2.0 standard drinks     Types: 1 Glasses of wine, 1 Shots of liquor per week     Comment: occasional/  Daily caffeine use    Drug use: No    Sexual activity: Defer     Partners: Male       Family History:  Family History   Problem Relation Age of Onset    Hypertension Mother     Stroke Mother     Heart disease Father     Hypertension Father     Cancer Brother     Cancer  "Son        Physical Exam:  /73 (BP Location: Right arm, Patient Position: Lying)   Pulse 120   Temp 97.7 øF (36.5 øC) (Oral)   Resp 20   Ht 160 cm (63\")   Wt 57.6 kg (127 lb)   SpO2 93%   BMI 22.50 kg/mý  Body mass index is 22.5 kg/mý. 93% 57.6 kg (127 lb)    Physical Exam:  Constitutional: Alert, elderly female laying in bed, does not appear to be acute distress.  Head: Normocephalic, atraumatic.  Mouth/throat: Moist mucous membranes.  Eyes: PERRLA, EOM intact, normal conjunctivae, +vision aid, +macular degeneration  Neck: Normal range of motion, supple, no JVD, trachea midline.  Cardiovascular: Tachycardic rate, regular rhythm, normal S1-S2.  Pulmonary: Normal respiratory effort, fine crackles heard throughout posteriorly, wheezing in left upper lobe.  Abdominal: Flat, soft, nontender, normal bowel sounds  : Not assessed  Musculoskeletal: Normal range of motion, no swelling, tenderness or edema.  Skin: Warm, dry, no erythema or ecchymosis.  Capillary fill less than 3 seconds  Neurological: Alert orient x3, no focal deficit.      LABS:  COVID19   Date Value Ref Range Status   08/04/2023 Not Detected Not Detected - Ref. Range Final       Lab Results   Component Value Date    CALCIUM 9.4 08/04/2023    PHOS 3.4 07/19/2022     Results from last 7 days   Lab Units 08/04/23  1152   SODIUM mmol/L 138   POTASSIUM mmol/L 4.3   CHLORIDE mmol/L 103   CO2 mmol/L 23.8   BUN mg/dL 10   CREATININE mg/dL 0.79   GLUCOSE mg/dL 125*   CALCIUM mg/dL 9.4   WBC 10*3/mm3 10.33   HEMOGLOBIN g/dL 11.8*   PLATELETS 10*3/mm3 285   ALT (SGPT) U/L 35*   AST (SGOT) U/L 22   PROBNP pg/mL 3,720.0*   PROCALCITONIN ng/mL 0.05     Lab Results   Component Value Date    CKTOTAL 66 02/08/2017    CKMB 2.98 02/08/2017    TROPONINT 22 (H) 08/04/2023     Results from last 7 days   Lab Units 08/04/23  1152   HSTROP T ng/L 22*         Results from last 7 days   Lab Units 08/04/23  1152   PROCALCITONIN ng/mL 0.05   LACTATE mmol/L 1.4       "   Results from last 7 days   Lab Units 08/04/23  1145   ADENOVIRUS DETECTION BY PCR  Not Detected   CORONAVIRUS 229E  Not Detected   CORONAVIRUS HKU1  Not Detected   CORONAVIRUS NL63  Not Detected   CORONAVIRUS OC43  Not Detected   HUMAN METAPNEUMOVIRUS  Not Detected   HUMAN RHINOVIRUS/ENTEROVIRUS  Not Detected   INFLUENZA B PCR  Not Detected   PARAINFLUENZA 1  Not Detected   PARAINFLUENZA VIRUS 2  Not Detected   PARAINFLUENZA VIRUS 3  Not Detected   PARAINFLUENZA VIRUS 4  Not Detected   BORDETELLA PERTUSSIS PCR  Not Detected   BORDETELLA PARAPERTUSSIS PCR  Not Detected   CHLAMYDOPHILA PNEUMONIAE PCR  Not Detected   MYCOPLAMA PNEUMO PCR  Not Detected   RSV, PCR  Not Detected             Lab Results   Component Value Date    TSH 3.100 08/27/2021     Estimated Creatinine Clearance: 41.3 mL/min (by C-G formula based on SCr of 0.79 mg/dL).         Imaging: I personally visualized the images of scans/x-rays performed within last 3 days.      Assessment:  Dyspnea on exertion  Acute pulmonary edema  Bronchiectasis  Chronic diastolic congestive heart failure  Chronic atrial fibrillation  Moderate asthma  Nocturnal hypoxia  Hypertension  Mitral valve regurgitation  Hypertension  Coronary artery disease  History of aspergillosis    Recommendations:  Chest x-ray reviewed demonstrating pulmonary vascular congestion and cardiomegaly  Patient reports symptomatic improvement after receiving diuresis.  Defer management of continued diuresis to primary/cardiology.  Symbicort BID  Mucinex BID  Scheduled Duo-Neb with hypertonic saline, CPT and flutter valve  Cover for Pseudomonas with cefepime- follow sputum/blood cultures to deescalate as able.    Patient was placed in face mask upon entering room and kept mask on throughout our encounter. I wore full protective equipment throughout this patient encounter including a face mask, gown and gloves. Hand hygiene was performed before donning protective equipment and after removal when  leaving the room.    Breanna Lay, APRN  8/4/2023  16:52 EDT      Much of this encounter note is an electronic transcription/translation of spoken language to printed text using Dragon Software.

## 2023-08-04 NOTE — CASE MANAGEMENT/SOCIAL WORK
Discharge Planning Assessment  [facility]     Patient Name: Agnieszka Rizzo  MRN: [id number]  Today's Date: 8/4/2023    Admit Date: 8/4/2023    Plan: Plan home.   ADELAIDE Aleman RN   Discharge Needs Assessment       Row Name 08/04/23 1551       Living Environment    People in Home alone    Current Living Arrangements condominium    Potentially Unsafe Housing Conditions none    Primary Care Provided by self    Provides Primary Care For no one    Family Caregiver if Needed child(graciela), adult    Family Caregiver Names Daughter ( Michelle Zaldivar [phone]) and Son (Handy [phone])    Quality of Family Relationships helpful;involved;supportive    Able to Return to Prior Arrangements yes    Living Arrangement Comments Pt lives alone in a single story patio home.       Resource/Environmental Concerns    Resource/Environmental Concerns none    Transportation Concerns none       Transition Planning    Patient/Family Anticipates Transition to home    Patient/Family Anticipated Services at Transition none    Transportation Anticipated family or friend will provide       Discharge Needs Assessment    Readmission Within the Last 30 Days no previous admission in last 30 days    Equipment Currently Used at Home bath bench;cane, straight;grab bar;nebulizer;oxygen;shower chair;walker, rolling    Concerns to be Addressed no discharge needs identified;denies needs/concerns at this time    Anticipated Changes Related to Illness none    Equipment Needed After Discharge bath bench;cane, straight;grab bar, tub/shower;nebulizer;oxygen;shower chair;walker, rolling                   Discharge Plan       Row Name 08/04/23 1556       Plan    Plan Plan home.   ADELAIDE Aleman RN    Patient/Family in Agreement with Plan yes    Plan Comments FACE SHEET VERIFIED/ IM LETTER SIGNED.   Spoke with pt and pt's son ( Handy Rizzo [phone]) at bedside.  Pt's PCP is Dr. Matt Rose.  Pt lives alone in a single story patio home.  Pt is independent  with ADLs.  Pt has a bath bench, cane, grab bar, nebulizer, shower chair and rolling walker for home use. Pt is on home O2 supplied by [facility].  Pt is not current with .  Pt  has been in [place] for skilled care.  Pt denies any discharge needs at this time.  Pt's daughter ( Michelle Zaldivar [phone]) and son (Handy [phone]) will assist pt at home and transport pt home.  Plan home.  ADELAIDE Aleman RN                  Continued Care and Services - Admitted Since 8/4/2023    Coordination has not been started for this encounter.          Demographic Summary       Row Name 08/04/23 1551       General Information    Admission Type inpatient    Arrived From emergency department    Required Notices Provided Important Message from Medicare    Referral Source admission list    Reason for Consult discharge planning    Preferred Language English                   Functional Status       Row Name 08/04/23 1551       Functional Status    Usual Activity Tolerance good    Current Activity Tolerance good       Functional Status, IADL    Medications independent    Meal Preparation independent    Housekeeping independent    Laundry independent    Shopping independent       Mental Status    General Appearance WDL WDL                   Psychosocial    No documentation.                  Abuse/Neglect    No documentation.                  Legal    No documentation.                  Substance Abuse    No documentation.                  Patient Forms    No documentation.                     Agata Aleman, RN

## 2023-08-04 NOTE — ED NOTES
"Nursing report ED to floor  Agnieszka Rizzo  89 y.o.  female    HPI :   Chief Complaint   Patient presents with    Shortness of Breath       Admitting doctor:   Nohemy Tobin MD    Admitting diagnosis:   The primary encounter diagnosis was Acute pulmonary edema. A diagnosis of RAMOS (dyspnea on exertion) was also pertinent to this visit.    Code status:   Current Code Status       Date Active Code Status Order ID Comments User Context       Prior            Allergies:   Amlodipine besylate, Aspirin, Bactrim [sulfamethoxazole-trimethoprim], Erythromycin, Levaquin [levofloxacin], Nitrofurantoin, and Ramipril    Intake and Output  No intake or output data in the 24 hours ending 08/04/23 1344    Weight:       08/04/23  1137   Weight: 57.6 kg (127 lb)       Most recent vitals:   Vitals:    08/04/23 1137 08/04/23 1140 08/04/23 1237 08/04/23 1301   BP:  (!) 153/109  150/90   BP Location:  Right arm     Patient Position:  Sitting     Pulse:   105 91   Resp:   18    Temp:       TempSrc:       SpO2:   95% 92%   Weight: 57.6 kg (127 lb)      Height: 160 cm (63\")          Active LDAs/IV Access:   Lines, Drains & Airways       Active LDAs       Name Placement date Placement time Site Days    Peripheral IV 08/04/23 1152 Right Antecubital 08/04/23  1152  Antecubital  less than 1                    Labs (abnormal labs have a star):   Labs Reviewed   COMPREHENSIVE METABOLIC PANEL - Abnormal; Notable for the following components:       Result Value    Glucose 125 (*)     ALT (SGPT) 35 (*)     All other components within normal limits    Narrative:     GFR Normal >60  Chronic Kidney Disease <60  Kidney Failure <15    The GFR formula is only valid for adults with stable renal function between ages 18 and 70.   BNP (IN-HOUSE) - Abnormal; Notable for the following components:    proBNP 3,720.0 (*)     All other components within normal limits    Narrative:     Among patients with dyspnea, NT-proBNP is highly sensitive for the detection of " acute congestive heart failure. In addition NT-proBNP of <300 pg/ml effectively rules out acute congestive heart failure with 99% negative predictive value.     SINGLE HSTROPONIN T - Abnormal; Notable for the following components:    HS Troponin T 22 (*)     All other components within normal limits    Narrative:     High Sensitive Troponin T Reference Range:  <10.0 ng/L- Negative Female for AMI  <15.0 ng/L- Negative Male for AMI  >=10 - Abnormal Female indicating possible myocardial injury.  >=15 - Abnormal Male indicating possible myocardial injury.   Clinicians would have to utilize clinical acumen, EKG, Troponin, and serial changes to determine if it is an Acute Myocardial Infarction or myocardial injury due to an underlying chronic condition.        CBC WITH AUTO DIFFERENTIAL - Abnormal; Notable for the following components:    RBC 3.68 (*)     Hemoglobin 11.8 (*)     Lymphocyte % 17.7 (*)     Immature Grans % 0.9 (*)     Neutrophils, Absolute 7.15 (*)     Monocytes, Absolute 1.07 (*)     Immature Grans, Absolute 0.09 (*)     All other components within normal limits   RESPIRATORY PANEL PCR W/ COVID-19 (SARS-COV-2) ANAI/JORGE/THOM/PAD/COR/MAD/NIKHIL IN-HOUSE, NP SWAB IN [facility], 3-4 HR TAT - Normal    Narrative:     In the setting of a positive respiratory panel with a viral infection PLUS a negative procalcitonin without other underlying concern for bacterial infection, consider observing off antibiotics or discontinuation of antibiotics and continue supportive care. If the respiratory panel is positive for atypical bacterial infection (Bordetella pertussis, Chlamydophila pneumoniae, or Mycoplasma pneumoniae), consider antibiotic de-escalation to target atypical bacterial infection.   LACTIC ACID, PLASMA - Normal   PROCALCITONIN - Normal    Narrative:     As a Marker for Sepsis (Non-Neonates):    1. <0.5 ng/mL represents a low risk of severe sepsis and/or septic shock.  2. >2 ng/mL represents a high risk of severe  "sepsis and/or septic shock.    As a Marker for Lower Respiratory Tract Infections that require antibiotic therapy:    PCT on Admission    Antibiotic Therapy       6-12 Hrs later    >0.5                Strongly Recommended  >0.25 - <0.5        Recommended   0.1 - 0.25          Discouraged              Remeasure/reassess PCT  <0.1                Strongly Discouraged     Remeasure/reassess PCT    As 28 day mortality risk marker: \"Change in Procalcitonin Result\" (>80% or <=80%) if Day 0 (or Day 1) and Day 4 values are available. Refer to http://www.[facility]-pct-calculator.com    Change in PCT <=80%  A decrease of PCT levels below or equal to 80% defines a positive change in PCT test result representing a higher risk for 28-day all-cause mortality of patients diagnosed with severe sepsis for septic shock.    Change in PCT >80%  A decrease of PCT levels of more than 80% defines a negative change in PCT result representing a lower risk for 28-day all-cause mortality of patients diagnosed with severe sepsis or septic shock.      BLOOD CULTURE   BLOOD CULTURE   RAINBOW DRAW    Narrative:     The following orders were created for panel order Vancleve Draw.  Procedure                               Abnormality         Status                     ---------                               -----------         ------                     Green Top (Gel)[[id number]]                                  Final result               Lavender Top[[id number]]                                     Final result               Gold Top - SST[[id number]]                                   Final result               Light Blue Top[[id number]]                                   Final result                 Please view results for these tests on the individual orders.   CBC AND DIFFERENTIAL    Narrative:     The following orders were created for panel order CBC & Differential.  Procedure                               Abnormality         Status                   "   ---------                               -----------         ------                     CBC Auto Differential[[id number]]        Abnormal            Final result                 Please view results for these tests on the individual orders.   GREEN TOP   LAVENDER TOP   GOLD TOP - SST   LIGHT BLUE TOP       EKG:   ECG 12 Lead Dyspnea   Preliminary Result   HEART RATE= 107  bpm   RR Interval= 561  ms   [place] Interval=   ms   P Horizontal Axis=   deg   P Front Axis=   deg   QRSD Interval= 79  ms   QT Interval= 359  ms   QRS Axis= 6  deg   T Wave Axis=   deg   - ABNORMAL ECG -   Atrial fibrillation   Borderline repol abnormality, diffuse leads   Electronically Signed By:    Date and Time of Study: 2023-08-04 11:44:48      ECG 12 Lead Dyspnea    (Results Pending)       Meds given in ED:   Medications   sodium chloride 0.9 % flush 10 mL (has no administration in time range)   ipratropium-albuterol (DUO-NEB) nebulizer solution 6 mL (6 mL Nebulization Given 8/4/23 1234)   methylPREDNISolone sodium succinate (SOLU-Medrol) injection 125 mg (125 mg Intravenous Given 8/4/23 1154)   furosemide (LASIX) injection 40 mg (40 mg Intravenous Given 8/4/23 1251)       Imaging results:  XR Chest 1 View    Result Date: 8/4/2023  Heart failure or volume overload with interstitial edema.  This report was finalized on 8/4/2023 12:01 PM by Dr. Geovanny Pollard M.D.       Ambulatory status:   - BR    Social issues:   Social History     Socioeconomic History    Marital status:    Tobacco Use    Smoking status: Never    Smokeless tobacco: Never    Tobacco comments:     caffiene daily   Vaping Use    Vaping Use: Never used   Substance and Sexual Activity    Alcohol use: Yes     Alcohol/week: 2.0 standard drinks     Types: 1 Glasses of wine, 1 Shots of liquor per week     Comment: occasional/  Daily caffeine use    Drug use: No    Sexual activity: Yes     Partners: Male       NIH Stroke Scale:        Nursing report ED to floor:

## 2023-08-05 LAB
ANION GAP SERPL CALCULATED.3IONS-SCNC: 9.3 MMOL/L (ref 5–15)
BUN SERPL-MCNC: 20 MG/DL (ref 8–23)
BUN/CREAT SERPL: 22.7 (ref 7–25)
CALCIUM SPEC-SCNC: 9.2 MG/DL (ref 8.2–9.6)
CHLORIDE SERPL-SCNC: 105 MMOL/L (ref 98–107)
CO2 SERPL-SCNC: 27.7 MMOL/L (ref 22–29)
CREAT SERPL-MCNC: 0.88 MG/DL (ref 0.57–1)
DEPRECATED RDW RBC AUTO: 46.5 FL (ref 37–54)
EGFRCR SERPLBLD CKD-EPI 2021: 61.7 ML/MIN/1.73
ERYTHROCYTE [DISTWIDTH] IN BLOOD BY AUTOMATED COUNT: 13.4 % (ref 12.3–15.4)
GLUCOSE SERPL-MCNC: 162 MG/DL (ref 65–99)
HCT VFR BLD AUTO: 33.2 % (ref 34–46.6)
HGB BLD-MCNC: 11.2 G/DL (ref 12–15.9)
MCH RBC QN AUTO: 32.1 PG (ref 26.6–33)
MCHC RBC AUTO-ENTMCNC: 33.7 G/DL (ref 31.5–35.7)
MCV RBC AUTO: 95.1 FL (ref 79–97)
PLATELET # BLD AUTO: 264 10*3/MM3 (ref 140–450)
PMV BLD AUTO: 9.8 FL (ref 6–12)
POTASSIUM SERPL-SCNC: 4.4 MMOL/L (ref 3.5–5.2)
QT INTERVAL: 310 MS
RBC # BLD AUTO: 3.49 10*6/MM3 (ref 3.77–5.28)
SODIUM SERPL-SCNC: 142 MMOL/L (ref 136–145)
WBC NRBC COR # BLD: 10.06 10*3/MM3 (ref 3.4–10.8)

## 2023-08-05 PROCEDURE — 94664 DEMO&/EVAL PT USE INHALER: CPT

## 2023-08-05 PROCEDURE — 94761 N-INVAS EAR/PLS OXIMETRY MLT: CPT

## 2023-08-05 PROCEDURE — 99223 1ST HOSP IP/OBS HIGH 75: CPT | Performed by: INTERNAL MEDICINE

## 2023-08-05 PROCEDURE — 94669 MECHANICAL CHEST WALL OSCILL: CPT

## 2023-08-05 PROCEDURE — 25010000002 FUROSEMIDE PER 20 MG: Performed by: INTERNAL MEDICINE

## 2023-08-05 PROCEDURE — 94799 UNLISTED PULMONARY SVC/PX: CPT

## 2023-08-05 PROCEDURE — 80048 BASIC METABOLIC PNL TOTAL CA: CPT | Performed by: INTERNAL MEDICINE

## 2023-08-05 PROCEDURE — 85027 COMPLETE CBC AUTOMATED: CPT | Performed by: INTERNAL MEDICINE

## 2023-08-05 RX ORDER — DIGOXIN 125 MCG
125 TABLET ORAL
Status: DISCONTINUED | OUTPATIENT
Start: 2023-08-05 | End: 2023-08-05

## 2023-08-05 RX ORDER — METOPROLOL TARTRATE 50 MG/1
100 TABLET, FILM COATED ORAL EVERY 12 HOURS SCHEDULED
Status: DISCONTINUED | OUTPATIENT
Start: 2023-08-05 | End: 2023-08-06

## 2023-08-05 RX ADMIN — Medication 1 CAPSULE: at 08:52

## 2023-08-05 RX ADMIN — GUAIFENESIN 600 MG: 600 TABLET, EXTENDED RELEASE ORAL at 08:52

## 2023-08-05 RX ADMIN — METOPROLOL TARTRATE 50 MG: 50 TABLET, FILM COATED ORAL at 20:30

## 2023-08-05 RX ADMIN — IPRATROPIUM BROMIDE AND ALBUTEROL SULFATE 3 ML: 2.5; .5 SOLUTION RESPIRATORY (INHALATION) at 11:21

## 2023-08-05 RX ADMIN — APIXABAN 2.5 MG: 2.5 TABLET, FILM COATED ORAL at 20:30

## 2023-08-05 RX ADMIN — POTASSIUM CHLORIDE 10 MEQ: 750 TABLET, EXTENDED RELEASE ORAL at 08:51

## 2023-08-05 RX ADMIN — METOPROLOL TARTRATE 100 MG: 50 TABLET, FILM COATED ORAL at 08:51

## 2023-08-05 RX ADMIN — MULTIPLE VITAMINS W/ MINERALS TAB 1 TABLET: TAB at 20:30

## 2023-08-05 RX ADMIN — CETIRIZINE HYDROCHLORIDE 10 MG: 10 TABLET ORAL at 08:52

## 2023-08-05 RX ADMIN — SENNOSIDES AND DOCUSATE SODIUM 2 TABLET: 50; 8.6 TABLET ORAL at 08:52

## 2023-08-05 RX ADMIN — MULTIPLE VITAMINS W/ MINERALS TAB 1 TABLET: TAB at 08:52

## 2023-08-05 RX ADMIN — BUDESONIDE AND FORMOTEROL FUMARATE DIHYDRATE 1 PUFF: 160; 4.5 AEROSOL RESPIRATORY (INHALATION) at 07:47

## 2023-08-05 RX ADMIN — IPRATROPIUM BROMIDE AND ALBUTEROL SULFATE 3 ML: 2.5; .5 SOLUTION RESPIRATORY (INHALATION) at 07:34

## 2023-08-05 RX ADMIN — FERROUS SULFATE TAB 325 MG (65 MG ELEMENTAL FE) 325 MG: 325 (65 FE) TAB at 08:52

## 2023-08-05 RX ADMIN — FUROSEMIDE 40 MG: 10 INJECTION, SOLUTION INTRAMUSCULAR; INTRAVENOUS at 12:26

## 2023-08-05 RX ADMIN — IPRATROPIUM BROMIDE AND ALBUTEROL SULFATE 3 ML: 2.5; .5 SOLUTION RESPIRATORY (INHALATION) at 19:16

## 2023-08-05 RX ADMIN — APIXABAN 2.5 MG: 2.5 TABLET, FILM COATED ORAL at 08:51

## 2023-08-05 RX ADMIN — BUDESONIDE AND FORMOTEROL FUMARATE DIHYDRATE 1 PUFF: 160; 4.5 AEROSOL RESPIRATORY (INHALATION) at 19:21

## 2023-08-05 RX ADMIN — IPRATROPIUM BROMIDE AND ALBUTEROL SULFATE 3 ML: 2.5; .5 SOLUTION RESPIRATORY (INHALATION) at 15:35

## 2023-08-05 RX ADMIN — CITALOPRAM 10 MG: 10 TABLET, FILM COATED ORAL at 08:52

## 2023-08-05 RX ADMIN — GUAIFENESIN 600 MG: 600 TABLET, EXTENDED RELEASE ORAL at 20:30

## 2023-08-05 RX ADMIN — Medication 1000 UNITS: at 08:52

## 2023-08-05 NOTE — PLAN OF CARE
Goal Outcome Evaluation:      Patient alert follows commands iv lasix given pure wick applied. No c/o pain or nausea noted. Self turn, standby assist to ambulate in room. No acute distress noted will continue to monitor

## 2023-08-05 NOTE — PROGRESS NOTES
Name: Agnieszka Rizzo ADMIT: 2023   : 1930  PCP: Matt Rose MD    MRN: [id number] LOS: 1 days   AGE/SEX: 89 y.o. female  ROOM: [facility]     Subjective   Subjective   No events overnight. She feels much better today       Objective   Objective   Vital Signs  Temp:  [97.7 øF (36.5 øC)-99.8 øF (37.7 øC)] 98.1 øF (36.7 øC)  Heart Rate:  [] 96  Resp:  [16-20] 16  BP: (129-153)/() 137/94  SpO2:  [92 %-100 %] 100 %  on  Flow (L/min):  [2] 2;   Device (Oxygen Therapy): nasal cannula  Body mass index is 21.77 kg/mý.  Physical Exam  Constitutional:       General: She is not in acute distress.     Appearance: She is not toxic-appearing.   Cardiovascular:      Rate and Rhythm: Normal rate and regular rhythm.      Heart sounds: Normal heart sounds.   Pulmonary:      Effort: Pulmonary effort is normal. No respiratory distress.      Breath sounds: Normal breath sounds. No wheezing, rhonchi or rales.   Abdominal:      General: Bowel sounds are normal. There is no distension.      Palpations: Abdomen is soft.      Tenderness: There is no abdominal tenderness. There is no guarding or rebound.   Musculoskeletal:         General: No tenderness.      Right lower leg: No edema.      Left lower leg: No edema.   Neurological:      Mental Status: She is alert.   Psychiatric:         Mood and Affect: Mood normal.         Behavior: Behavior normal.       Results Review     I reviewed the patient's new clinical results.  Results from last 7 days   Lab Units 23  0718 23  1152   WBC 10*3/mm3 10.06 10.33   HEMOGLOBIN g/dL 11.2* 11.8*   PLATELETS 10*3/mm3 264 285     Results from last 7 days   Lab Units 23  0718 23  1152   SODIUM mmol/L 142 138   POTASSIUM mmol/L 4.4 4.3   CHLORIDE mmol/L 105 103   CO2 mmol/L 27.7 23.8   BUN mg/dL 20 10   CREATININE mg/dL 0.88 0.79   GLUCOSE mg/dL 162* 125*   Estimated Creatinine Clearance: 35.9 mL/min (by C-G formula based on SCr of 0.88 mg/dL).  Results from  last 7 days   Lab Units 08/04/23  1152   ALBUMIN g/dL 3.5   BILIRUBIN mg/dL 1.2   ALK PHOS U/L 113   AST (SGOT) U/L 22   ALT (SGPT) U/L 35*     Results from last 7 days   Lab Units 08/05/23  0718 08/04/23  1152   CALCIUM mg/dL 9.2 9.4   ALBUMIN g/dL  --  3.5     Results from last 7 days   Lab Units 08/04/23  1152   PROCALCITONIN ng/mL 0.05   LACTATE mmol/L 1.4     COVID19   Date Value Ref Range Status   08/04/2023 Not Detected Not Detected - Ref. Range Final   04/03/2023 Not Detected Not Detected - Ref. Range Final   07/15/2022 Not Detected Not Detected - Ref. Range Final   07/09/2022 Not Detected Not Detected - Ref. Range Final     No results found for: HGBA1C, POCGLU    XR Chest 1 View  Narrative: PORTABLE CHEST X-RAY     HISTORY: Shortness of breath.     TECHNIQUE: Portable chest x-ray is correlated with chest x-ray  04/08/2023.     FINDINGS: There is cardiomegaly with central pulmonary vascular  engorgement and interstitial edema. No pleural effusion. Atelectasis at  the right lung base. Old healed left clavicle fracture.     Impression: Heart failure or volume overload with interstitial edema.     This report was finalized on 8/4/2023 12:01 PM by Dr. Geovanny Pollard M.D.       Scheduled Medications  apixaban, 2.5 mg, Oral, Q12H  budesonide-formoterol, 1 puff, Inhalation, BID - RT  cetirizine, 10 mg, Oral, Daily  cholecalciferol, 1,000 Units, Oral, Daily  citalopram, 10 mg, Oral, Daily  ferrous sulfate, 325 mg, Oral, Daily  furosemide, 40 mg, Intravenous, Q12H  guaiFENesin, 600 mg, Oral, BID  ipratropium-albuterol, 3 mL, Nebulization, 4x Daily - RT  lactobacillus acidophilus, 1 capsule, Oral, Daily  metoprolol tartrate, 100 mg, Oral, Q12H  multivitamin with minerals, 1 tablet, Oral, BID  potassium chloride, 10 mEq, Oral, Daily  senna-docusate sodium, 2 tablet, Oral, BID    Infusions   Diet  Diet: Cardiac Diets; Healthy Heart (2-3 Na+); Texture: Regular Texture (IDDSI 7); Fluid Consistency: Thin (IDDSI 0)        Assessment/Plan     Active Hospital Problems    Diagnosis  POA    **Acute pulmonary edema [J81.0]  Yes    Bronchiectasis [J47.9]  Yes    Paroxysmal atrial fibrillation [I48.0]  Yes    Chronic diastolic CHF (congestive heart failure) [I50.32]  Yes    Primary hypertension [I10]  Yes    Chronic coronary artery disease [I25.10]  Yes    Familial hypercholesterolemia [E78.01]  Yes      Resolved Hospital Problems   No resolved problems to display.       89 y.o. female admitted with Acute pulmonary edema.    Acute on chronic diastolic heart failure-on iv diuretics, but now appears euvolemic and is symptomatically improved. Discussed with Dr. Fatima and likely to transition to oral diuretics tomorrow.   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  Full code.  Discussed with patient and consulting provider.  Anticipate discharge home tomorrow.      Jonnie Daniels MD  [facility]  08/05/23  11:18 EDT    I wore protective equipment throughout this patient encounter including a face mask, gloves and protective eyewear.  Hand hygiene was performed before donning protective equipment and after removal when leaving the room.

## 2023-08-05 NOTE — PROGRESS NOTES
[facility] INPATIENT PROGRESS NOTE         [address]    2023      PATIENT IDENTIFICATION:  Name: Agnieszka Rizzo ADMIT: 2023   : 1930  PCP: Matt Rose MD    MRN: [id number] LOS: 1 days   AGE/SEX: 89 y.o. female  ROOM: [facility]                     LOS 1    Reason for visit: Acute respiratory failure with CHF, pulmonary edema      SUBJECTIVE:      Getting vest therapy at this time.  Says that it feels like it is helping her cough.  Discussed with nursing staff at bedside  I am seeing the patient for the first time today.  All patient problems are new to me.      Objective   OBJECTIVE:    Vital Sign Min/Max for last 24 hours  Temp  Min: 97.7 øF (36.5 øC)  Max: 99.8 øF (37.7 øC)   BP  Min: 129/84  Max: 153/109   Pulse  Min: 91  Max: 120   Resp  Min: 16  Max: 20   SpO2  Min: 92 %  Max: 100 %   No data recorded   Weight  Min: 55.7 kg (122 lb 14.4 oz)  Max: 57.6 kg (127 lb)                         Body mass index is 21.77 kg/mý.    Intake/Output Summary (Last 24 hours) at 2023 1044  Last data filed at 2023 0506  Gross per 24 hour   Intake 200 ml   Output 1300 ml   Net -1100 ml         Exam:  GEN:  No distress, appears stated age  EYES:   PERRL, anicteric sclerae  ENT:    External ears/nose normal, OP clear  NECK:  No adenopathy, midline trachea  LUNGS: Normal chest on inspection, palpation and scattered coarse breath sounds bilaterally on auscultation  CV:  Normal S1S2, without murmur  ABD:  Nontender, nondistended, no hepatosplenomegaly, +BS  EXT:  No edema.  No cyanosis or clubbing.  No mottling and normal cap refill.    Assessment     Scheduled meds:  apixaban, 2.5 mg, Oral, Q12H  budesonide-formoterol, 1 puff, Inhalation, BID - RT  cetirizine, 10 mg, Oral, Daily  cholecalciferol, 1,000 Units, Oral, Daily  citalopram, 10 mg, Oral, Daily  ferrous sulfate, 325 mg, Oral, Daily  furosemide, 40 mg, Intravenous, Q12H  guaiFENesin, 600 mg, Oral, BID  ipratropium-albuterol, 3 mL,  Nebulization, 4x Daily - RT  lactobacillus acidophilus, 1 capsule, Oral, Daily  metoprolol tartrate, 100 mg, Oral, Q12H  multivitamin with minerals, 1 tablet, Oral, BID  potassium chloride, 10 mEq, Oral, Daily  senna-docusate sodium, 2 tablet, Oral, BID      IV meds:                         Data Review:  Results from last 7 days   Lab Units 08/05/23  0718 08/04/23  1152   SODIUM mmol/L 142 138   POTASSIUM mmol/L 4.4 4.3   CHLORIDE mmol/L 105 103   CO2 mmol/L 27.7 23.8   BUN mg/dL 20 10   CREATININE mg/dL 0.88 0.79   GLUCOSE mg/dL 162* 125*   CALCIUM mg/dL 9.2 9.4         Estimated Creatinine Clearance: 35.9 mL/min (by C-G formula based on SCr of 0.88 mg/dL).  Results from last 7 days   Lab Units 08/05/23  0718 08/04/23  1152   WBC 10*3/mm3 10.06 10.33   HEMOGLOBIN g/dL 11.2* 11.8*   PLATELETS 10*3/mm3 264 285         Results from last 7 days   Lab Units 08/04/23  1152   ALT (SGPT) U/L 35*   AST (SGOT) U/L 22         Results from last 7 days   Lab Units 08/04/23  1152   PROCALCITONIN ng/mL 0.05   LACTATE mmol/L 1.4         No results found for: HGBA1C, POCGLU    Chest x-ray 8/4 reviewed shows volume overload and interstitial edema.        Microbiology reviewed                Active Hospital Problems    Diagnosis  POA    **Acute pulmonary edema [J81.0]  Yes      Resolved Hospital Problems   No resolved problems to display.         ASSESSMENT:  Dyspnea on exertion  Acute pulmonary edema  Bronchiectasis  Acute on chronic diastolic congestive heart failure  Chronic atrial fibrillation  Moderate asthma  Nocturnal hypoxia  Hypertension  Mitral valve regurgitation  Hypertension  Coronary artery disease  History of aspergillosis      PLAN:  Encourage pulmonary toilet.  Wean oxygen as able.  Continue scheduled bronchodilators.  On IV diuretics for heart failure symptoms and edema.  Does not appear to be an infectious process going on and antibiotics were discontinued.  Monitor closely.          Gerhard Camilo MD  Pulmonary and  Critical Care Medicine  [facility], [facility]  8/5/2023    10:44 EDT

## 2023-08-05 NOTE — CONSULTS
Patient Name: Agnieszka Rizzo  :1930  89 y.o.    Date of Admission: 2023  Date of Consultation:  23  Encounter Provider: Valentina Fatima MD  Place of Service: [facility] CARDIOLOGY  Referring Provider: Nohemy Tobin MD  Patient Care Team:  Matt Rose MD as PCP - General (Family Medicine)  Marcie Robbins MD as Consulting Physician (Cardiology)  Nilesh Danielle MD as Consulting Physician (Urology)  Lina Morris RPH as Pharmacist  Lev Mckeon PharmD as Pharmacist (Pharmacy)  Rogelio Tucker MD as Consulting Physician (Pulmonary Disease)      Chief complaint: CHF    History of Present Illness:  Patient is a pleasant 89-year-old woman who usually sees Dr. Robbins.  She has a history of A-fib, HFpEF, bronchiectasis.  Over the last week she has noticed her weight has increased about 4 pounds.  Whenever she checked her heart rate even at rest it was in the 120s.  She felt dyspneic with exertion but became markedly worse yesterday.  Came to the emergency room found to be in CHF with rapid rates in A-fib.  No evidence of bronchiectasis or pneumonia.  This morning she is up ambulating quite comfortably, on room air.    Past Medical History:   Diagnosis Date    Aspergillus     Asthma     Atrial fibrillation     chronic    Atrial flutter     Bronchiectasis     C. difficile diarrhea 2017    CAD (coronary artery disease)     nonobstructive    Chronic diastolic CHF (congestive heart failure)     Colitis     Cough     Cryoglobulinemia     Dyspnea on exertion     Fall     Hyperlipidemia     Hypertension     Hyponatremia     Hypoxia     Infectious viral hepatitis     AGE 13    Left shoulder pain     Leg swelling     Lesion of lung     Malignant hyperthermia due to anesthesia     Mild tricuspid regurgitation     MR (mitral regurgitation)     mild    MVP (mitral valve prolapse)     Permanent atrial fibrillation     Pneumonia with the fungal infection aspergillosis 2017     Pneumothorax     SOB (shortness of breath)     UTI (urinary tract infection)     Wheeze     mild       Past Surgical History:   Procedure Laterality Date    BRONCHOSCOPY N/A 11/12/2016    Procedure: BRONCHOSCOPY WITH FLUORO, BRUSHINGS, BAL, AND BIOPSIES;  Surgeon: Rogelio Tucker MD;  Location: [facility] ENDOSCOPY;  Service:     BRONCHOSCOPY Bilateral 06/03/2017    Procedure: BRONCHOSCOPY with BAL ;  Surgeon: Sung King MD;  Location: [facility] ENDOSCOPY;  Service:     BRONCHOSCOPY N/A 12/17/2019    Procedure: BRONCHOSCOPY WITH WASHINGS;  Surgeon: Rogelio Tucker MD;  Location: [facility] ENDOSCOPY;  Service: Pulmonary    BRONCHOSCOPY N/A 07/15/2022    Procedure: BRONCHOSCOPY;  Surgeon: Rogelio Tucker MD;  Location: [facility] ENDOSCOPY;  Service: Pulmonary;  Laterality: N/A;  Pre: Pneumonia  Post: Pneumonia    CATARACT EXTRACTION EXTRACAPSULAR W/ INTRAOCULAR LENS IMPLANTATION      COLONOSCOPY      2013    D & C WITH SUCTION      HYSTERECTOMY      KNEE ARTHROSCOPY Left     Partial         Prior to Admission medications    Medication Sig Start Date End Date Taking? Authorizing Provider   acetaminophen (TYLENOL) 325 MG tablet Take 2 tablets by mouth Every 4 (Four) Hours As Needed for Moderate Pain. 7/13/21  Yes Jane Mcclellan MD   albuterol sulfate  (90 Base) MCG/ACT inhaler Inhale 2 puffs Every 6 (Six) Hours As Needed for Wheezing or Shortness of Air. 4/20/23  Yes Marcie Robbins MD   apixaban (ELIQUIS) 2.5 MG tablet tablet Take 1 tablet by mouth Every 12 (Twelve) Hours. Indications: Other - full anticoagulation 6/9/23  Yes Marcie Robbins MD   azithromycin (ZITHROMAX) 250 MG tablet Take 1 tablet by mouth Daily.   Yes Jane Mcclellan MD   benzonatate (TESSALON) 200 MG capsule Take 1 capsule by mouth 3 (Three) Times a Day As Needed. Indications: Cough 4/10/23  Yes Jane Mcclellan MD   cholecalciferol (VITAMIN D3) 25 MCG (1000 UT) tablet Take 1 tablet by mouth Daily.   Yes Jane Mcclellan MD    citalopram (CeleXA) 10 MG tablet TAKE 1 TABLET DAILY  Patient taking differently: Take 1 tablet by mouth Daily. 5/3/23  Yes Matt Rose MD   FeroSul 325 (65 Fe) MG tablet TAKE ONE TABLET BY MOUTH DAILY WITH BREAKFAST  Patient taking differently: Take 1 tablet by mouth Daily. 10/19/22  Yes Matt Rose MD   fexofenadine (ALLEGRA) 180 MG tablet Take 1 tablet by mouth Daily. 4/10/23  Yes Jane Mcclellan MD   fluticasone (FLONASE) 50 MCG/ACT nasal spray 2 sprays into the nostril(s) as directed by provider At Night As Needed for Allergies. 9/29/17  Yes Jane Mcclellan MD   fluticasone-salmeterol (ADVAIR HFA) 115-21 MCG/ACT inhaler Inhale 2 puffs 2 (Two) Times a Day.   Yes Jane Mcclellan MD   guaiFENesin (MUCINEX) 600 MG 12 hr tablet Take 1 tablet by mouth 2 (Two) Times a Day.   Yes Jane Mcclellan MD   ipratropium-albuterol (DUO-NEB) 0.5-2.5 mg/3 ml nebulizer Take 3 mL by nebulization 2 (Two) Times a Day. 8/1/22  Yes Jane Mcclellan MD   Lactobacillus (FLORAJEN ACIDOPHILUS) capsule Take 1 tablet by mouth Daily.   Yes Jane Mcclellan MD   metoprolol tartrate (LOPRESSOR) 50 MG tablet Take 1 tablet by mouth Every 12 (Twelve) Hours. 6/7/23  Yes Marcie Robbins MD   Multiple Vitamins-Minerals (PRESERVISION AREDS PO) Take 1 tablet by mouth 2 (Two) Times a Day. 8/12/22  Yes Jane Mcclellan MD   potassium chloride (MICRO-K) 10 MEQ CR capsule Take 1 capsule by mouth Daily. 9/27/22  Yes Marcie Robbins MD   sodium chloride 0.9 % nebulizer solution Take 3 mL by nebulization 2 (Two) Times a Day.   Yes Jane Mcclellan MD   torsemide (DEMADEX) 20 MG tablet Take 1 tablet by mouth 2 (Two) Times a Day. 9/27/22  Yes Marcie Robbins MD   glucosamine-chondroitin 500-400 MG capsule capsule Take 1 capsule by mouth Daily. 1/1/21   Jane Mcclellan MD       Allergies   Allergen Reactions    Amlodipine Besylate Swelling    Aspirin GI Intolerance     Caused bleeding ulcers     Bactrim [Sulfamethoxazole-Trimethoprim] Nausea And Vomiting    Erythromycin Unknown (See Comments)     Patient does not recall type of reaction.    Levaquin [Levofloxacin] Unknown (See Comments)     Nausea      Nitrofurantoin Nausea Only and Nausea And Vomiting    Ramipril Other (See Comments)     Cough       Social History     Socioeconomic History    Marital status:    Tobacco Use    Smoking status: Never    Smokeless tobacco: Never    Tobacco comments:     caffiene daily   Vaping Use    Vaping Use: Never used   Substance and Sexual Activity    Alcohol use: Yes     Alcohol/week: 2.0 standard drinks     Types: 1 Glasses of wine, 1 Shots of liquor per week     Comment: occasional/  Daily caffeine use    Drug use: No    Sexual activity: Defer     Partners: Male       Family History   Problem Relation Age of Onset    Hypertension Mother     Stroke Mother     Heart disease Father     Hypertension Father     Cancer Brother     Cancer Son        REVIEW OF SYSTEMS:   All systems reviewed.  Pertinent positives identified in HPI.  All other systems are negative.      Objective:     Vitals:    08/05/23 1121 08/05/23 1126 08/05/23 1224 08/05/23 1535   BP:   117/94    BP Location:   Right arm    Patient Position:   Sitting    Pulse: 84 82 92    Resp: 17  16 18   Temp:       TempSrc:       SpO2: 98% 98% 100%    Weight:       Height:         Body mass index is 21.77 kg/mý.    General Appearance:    Alert, cooperative, in no acute distress   Head:    Normocephalic, without obvious abnormality, atraumatic   Eyes:            Lids and lashes normal, conjunctivae and sclerae normal, no icterus, no pallor, corneas clear, PERRLA   Ears:    Ears appear intact with no abnormalities noted   Throat:   No oral lesions, no thrush, oral mucosa moist   Neck:   No adenopathy, supple, trachea midline, no thyromegaly, no carotid bruit, no JVD   Back:     No kyphosis present, no scoliosis present, no skin lesions, erythema or scars, no  tenderness to percussion or palpation, range of motion normal   Lungs:     Clear to auscultation, respirations regular, even and unlabored    Heart:    Regular rhythm and normal rate, normal S1 and S2, no murmur, no gallop, no rub, no click   Chest Wall:    No abnormalities observed   Abdomen:     Normal bowel sounds, no masses, no organomegaly, soft, nontender, nondistended, no guarding, no rebound  tenderness   Extremities:   Moves all extremities well, no edema, no cyanosis, no redness   Pulses:   Pulses palpable and equal bilaterally. Normal radial, carotid, femoral, dorsalis pedis and posterior tibial pulses bilaterally. Normal abdominal aorta   Skin:  Psychiatric:   No bleeding, bruising or rash    Alert and oriented x 3, normal mood and affect   Lab Review:     Results from last 7 days   Lab Units 08/05/23  0718 08/04/23  1152   SODIUM mmol/L 142 138   POTASSIUM mmol/L 4.4 4.3   CHLORIDE mmol/L 105 103   CO2 mmol/L 27.7 23.8   BUN mg/dL 20 10   CREATININE mg/dL 0.88 0.79   CALCIUM mg/dL 9.2 9.4   BILIRUBIN mg/dL  --  1.2   ALK PHOS U/L  --  113   ALT (SGPT) U/L  --  35*   AST (SGOT) U/L  --  22   GLUCOSE mg/dL 162* 125*     Results from last 7 days   Lab Units 08/04/23  2301 08/04/23  2055 08/04/23  1152   HSTROP T ng/L 19* 20* 22*     Results from last 7 days   Lab Units 08/05/23  0718   WBC 10*3/mm3 10.06   HEMOGLOBIN g/dL 11.2*   HEMATOCRIT % 33.2*   PLATELETS 10*3/mm3 264                           I personally viewed and interpreted the patient's EKG/Telemetry data.        Assessment and Plan:       1.  Rapid uncontrolled A-fib: Heart rates at rest to the 130s.  Most likely the cause of a HFpEF exacerbation.  I increase metoprolol to 100 twice daily and rates have improved.  Continue this.  Seems to tolerate it well.  2.  HFpEF: Received Lasix IV 40 mg x 3.  Appears to be net negative a little over 1.5 L.  Return to p.o. diuretics tomorrow.  3.  Bronchiectasis  4.  Mildly elevated troponin, plateaued at  20.  No ACS, no ischemic changes on EKG, no chest pain.  This is a type II non-STEMI in the setting of demand ischemia with tachycardia.    Home tomorrow  Valentina Fatima MD  08/05/23  17:44 EDT

## 2023-08-05 NOTE — SIGNIFICANT NOTE
Patient is up ad mira in her room with no assistive device and reports no concerns or difficulty with her mobility. PT will sign off.

## 2023-08-05 NOTE — PLAN OF CARE
Pt oriented, NAD, vs stable lopressor 100mg BID A fib 90s-100, up adlib      Problem: Adult Inpatient Plan of Care  Goal: Plan of Care Review  Outcome: Ongoing, Progressing  Goal: Patient-Specific Goal (Individualized)  Outcome: Ongoing, Progressing  Goal: Absence of Hospital-Acquired Illness or Injury  Outcome: Ongoing, Progressing  Intervention: Identify and Manage Fall Risk  Recent Flowsheet Documentation  Taken 8/5/2023 1400 by Pat Bolaños RN  Safety Promotion/Fall Prevention:   activity supervised   assistive device/personal items within reach   clutter free environment maintained   fall prevention program maintained   nonskid shoes/slippers when out of bed   room organization consistent   safety round/check completed  Taken 8/5/2023 0850 by Pat Bolaños RN  Safety Promotion/Fall Prevention:   activity supervised   assistive device/personal items within reach   clutter free environment maintained   fall prevention program maintained   nonskid shoes/slippers when out of bed   room organization consistent   safety round/check completed  Intervention: Prevent Skin Injury  Recent Flowsheet Documentation  Taken 8/5/2023 1400 by Pat Bolaños RN  Body Position: position changed independently  Taken 8/5/2023 1200 by Pat Bolaños RN  Body Position: position changed independently  Taken 8/5/2023 1000 by Pat Bolaños RN  Body Position: position changed independently  Taken 8/5/2023 0850 by Pat Bolaños RN  Body Position: dangle, side of bed  Intervention: Prevent and Manage VTE (Venous Thromboembolism) Risk  Recent Flowsheet Documentation  Taken 8/5/2023 1400 by Pat Bolaños, RN  Activity Management: up ad mira  Taken 8/5/2023 1237 by Pat Bolaños RN  Activity Management: up in chair  Taken 8/5/2023 0850 by Pat Bolaños RN  Activity Management: activity encouraged  VTE Prevention/Management: (Eliquis) other (see comments)  Range of Motion: active ROM (range of motion)  encouraged  Intervention: Prevent Infection  Recent Flowsheet Documentation  Taken 8/5/2023 1400 by Pat Bolaños RN  Infection Prevention:   rest/sleep promoted   single patient room provided  Taken 8/5/2023 0850 by Pat Bolaños RN  Infection Prevention:   single patient room provided   rest/sleep promoted  Goal: Optimal Comfort and Wellbeing  Outcome: Ongoing, Progressing  Intervention: Provide Person-Centered Care  Recent Flowsheet Documentation  Taken 8/5/2023 1400 by Pat Bolaños RN  Trust Relationship/Rapport: care explained  Taken 8/5/2023 0850 by Pat Bolaños RN  Trust Relationship/Rapport:   care explained   thoughts/feelings acknowledged   reassurance provided   questions encouraged   questions answered   emotional support provided  Goal: Readiness for Transition of Care  Outcome: Ongoing, Progressing     Problem: Fall Injury Risk  Goal: Absence of Fall and Fall-Related Injury  Outcome: Ongoing, Progressing  Intervention: Identify and Manage Contributors  Recent Flowsheet Documentation  Taken 8/5/2023 1400 by Pat Bolaños RN  Medication Review/Management: medications reviewed  Self-Care Promotion:   independence encouraged   BADL personal objects within reach   BADL personal routines maintained  Taken 8/5/2023 0850 by Pat Bolaños RN  Medication Review/Management: medications reviewed  Self-Care Promotion:   independence encouraged   BADL personal objects within reach   BADL personal routines maintained  Intervention: Promote Injury-Free Environment  Recent Flowsheet Documentation  Taken 8/5/2023 1400 by Pat Bolaños RN  Safety Promotion/Fall Prevention:   activity supervised   assistive device/personal items within reach   clutter free environment maintained   fall prevention program maintained   nonskid shoes/slippers when out of bed   room organization consistent   safety round/check completed  Taken 8/5/2023 0850 by Pat Bolaños RN  Safety Promotion/Fall Prevention:    activity supervised   assistive device/personal items within reach   clutter free environment maintained   fall prevention program maintained   nonskid shoes/slippers when out of bed   room organization consistent   safety round/check completed     Problem: Asthma Comorbidity  Goal: Maintenance of Asthma Control  Outcome: Ongoing, Progressing  Intervention: Maintain Asthma Symptom Control  Recent Flowsheet Documentation  Taken 8/5/2023 1400 by Pat Bolaños RN  Medication Review/Management: medications reviewed  Taken 8/5/2023 0850 by Pat Bolaños RN  Medication Review/Management: medications reviewed     Problem: Heart Failure Comorbidity  Goal: Maintenance of Heart Failure Symptom Control  Outcome: Ongoing, Progressing  Intervention: Maintain Heart Failure-Management  Recent Flowsheet Documentation  Taken 8/5/2023 1400 by Pat Bolaños RN  Medication Review/Management: medications reviewed  Taken 8/5/2023 0850 by Pat Bolaños RN  Medication Review/Management: medications reviewed     Problem: Hypertension Comorbidity  Goal: Blood Pressure in Desired Range  Outcome: Ongoing, Progressing  Intervention: Maintain Blood Pressure Management  Recent Flowsheet Documentation  Taken 8/5/2023 1400 by Pat Bolaños RN  Medication Review/Management: medications reviewed  Taken 8/5/2023 0850 by Pat Bolaños RN  Medication Review/Management: medications reviewed   Goal Outcome Evaluation:

## 2023-08-06 ENCOUNTER — READMISSION MANAGEMENT (OUTPATIENT)
Dept: CALL CENTER | Facility: HOSPITAL | Age: 88
End: 2023-08-06
Payer: MEDICARE

## 2023-08-06 VITALS
RESPIRATION RATE: 20 BRPM | WEIGHT: 124.1 LBS | DIASTOLIC BLOOD PRESSURE: 77 MMHG | TEMPERATURE: 97.9 F | BODY MASS INDEX: 21.99 KG/M2 | OXYGEN SATURATION: 92 % | HEART RATE: 66 BPM | HEIGHT: 63 IN | SYSTOLIC BLOOD PRESSURE: 151 MMHG

## 2023-08-06 PROBLEM — J81.0 ACUTE PULMONARY EDEMA: Status: RESOLVED | Noted: 2023-08-04 | Resolved: 2023-08-06

## 2023-08-06 LAB
ANION GAP SERPL CALCULATED.3IONS-SCNC: 10 MMOL/L (ref 5–15)
BACTERIA SPEC RESP CULT: NORMAL
BUN SERPL-MCNC: 22 MG/DL (ref 8–23)
BUN/CREAT SERPL: 29.3 (ref 7–25)
CALCIUM SPEC-SCNC: 9 MG/DL (ref 8.2–9.6)
CHLORIDE SERPL-SCNC: 105 MMOL/L (ref 98–107)
CO2 SERPL-SCNC: 26 MMOL/L (ref 22–29)
CREAT SERPL-MCNC: 0.75 MG/DL (ref 0.57–1)
EGFRCR SERPLBLD CKD-EPI 2021: 74.8 ML/MIN/1.73
GLUCOSE SERPL-MCNC: 96 MG/DL (ref 65–99)
GRAM STN SPEC: NORMAL
MAGNESIUM SERPL-MCNC: 2.4 MG/DL (ref 1.7–2.3)
POTASSIUM SERPL-SCNC: 3.5 MMOL/L (ref 3.5–5.2)
SODIUM SERPL-SCNC: 141 MMOL/L (ref 136–145)

## 2023-08-06 PROCEDURE — 94799 UNLISTED PULMONARY SVC/PX: CPT

## 2023-08-06 PROCEDURE — 80048 BASIC METABOLIC PNL TOTAL CA: CPT | Performed by: STUDENT IN AN ORGANIZED HEALTH CARE EDUCATION/TRAINING PROGRAM

## 2023-08-06 PROCEDURE — 83735 ASSAY OF MAGNESIUM: CPT | Performed by: STUDENT IN AN ORGANIZED HEALTH CARE EDUCATION/TRAINING PROGRAM

## 2023-08-06 PROCEDURE — 99233 SBSQ HOSP IP/OBS HIGH 50: CPT

## 2023-08-06 PROCEDURE — 94664 DEMO&/EVAL PT USE INHALER: CPT

## 2023-08-06 PROCEDURE — 94761 N-INVAS EAR/PLS OXIMETRY MLT: CPT

## 2023-08-06 PROCEDURE — 94669 MECHANICAL CHEST WALL OSCILL: CPT

## 2023-08-06 RX ORDER — FUROSEMIDE 40 MG/1
40 TABLET ORAL DAILY
Status: DISCONTINUED | OUTPATIENT
Start: 2023-08-06 | End: 2023-08-06 | Stop reason: HOSPADM

## 2023-08-06 RX ORDER — METOPROLOL TARTRATE 50 MG/1
50 TABLET, FILM COATED ORAL NIGHTLY
Status: DISCONTINUED | OUTPATIENT
Start: 2023-08-06 | End: 2023-08-06 | Stop reason: HOSPADM

## 2023-08-06 RX ORDER — METOPROLOL TARTRATE 100 MG/1
100 TABLET ORAL EVERY MORNING
Qty: 30 TABLET | Refills: 0 | Status: SHIPPED | OUTPATIENT
Start: 2023-08-07 | End: 2023-08-21 | Stop reason: SDUPTHER

## 2023-08-06 RX ORDER — METOPROLOL TARTRATE 50 MG/1
50 TABLET, FILM COATED ORAL NIGHTLY
Qty: 30 TABLET | Refills: 0 | Status: SHIPPED | OUTPATIENT
Start: 2023-08-06 | End: 2023-08-21 | Stop reason: SDUPTHER

## 2023-08-06 RX ORDER — METOPROLOL TARTRATE 50 MG/1
100 TABLET, FILM COATED ORAL EVERY MORNING
Status: DISCONTINUED | OUTPATIENT
Start: 2023-08-07 | End: 2023-08-06 | Stop reason: HOSPADM

## 2023-08-06 RX ORDER — TORSEMIDE 20 MG/1
40 TABLET ORAL 2 TIMES DAILY
Qty: 120 TABLET | Refills: 0 | Status: SHIPPED | OUTPATIENT
Start: 2023-08-06 | End: 2023-08-21 | Stop reason: SDUPTHER

## 2023-08-06 RX ORDER — POTASSIUM CHLORIDE 750 MG/1
40 TABLET, FILM COATED, EXTENDED RELEASE ORAL ONCE
Status: COMPLETED | OUTPATIENT
Start: 2023-08-06 | End: 2023-08-06

## 2023-08-06 RX ORDER — POTASSIUM CHLORIDE 20 MEQ/1
20 TABLET, EXTENDED RELEASE ORAL DAILY
Qty: 30 TABLET | Refills: 0 | Status: SHIPPED | OUTPATIENT
Start: 2023-08-07 | End: 2023-08-21 | Stop reason: SDUPTHER

## 2023-08-06 RX ORDER — POTASSIUM CHLORIDE 750 MG/1
20 TABLET, FILM COATED, EXTENDED RELEASE ORAL DAILY
Status: DISCONTINUED | OUTPATIENT
Start: 2023-08-07 | End: 2023-08-06 | Stop reason: HOSPADM

## 2023-08-06 RX ADMIN — BUDESONIDE AND FORMOTEROL FUMARATE DIHYDRATE 1 PUFF: 160; 4.5 AEROSOL RESPIRATORY (INHALATION) at 06:31

## 2023-08-06 RX ADMIN — MULTIPLE VITAMINS W/ MINERALS TAB 1 TABLET: TAB at 08:48

## 2023-08-06 RX ADMIN — FERROUS SULFATE TAB 325 MG (65 MG ELEMENTAL FE) 325 MG: 325 (65 FE) TAB at 08:48

## 2023-08-06 RX ADMIN — POTASSIUM CHLORIDE 40 MEQ: 750 TABLET, EXTENDED RELEASE ORAL at 08:48

## 2023-08-06 RX ADMIN — CITALOPRAM 10 MG: 10 TABLET, FILM COATED ORAL at 08:48

## 2023-08-06 RX ADMIN — APIXABAN 2.5 MG: 2.5 TABLET, FILM COATED ORAL at 08:48

## 2023-08-06 RX ADMIN — FUROSEMIDE 40 MG: 40 TABLET ORAL at 12:29

## 2023-08-06 RX ADMIN — GUAIFENESIN 600 MG: 600 TABLET, EXTENDED RELEASE ORAL at 08:48

## 2023-08-06 RX ADMIN — METOPROLOL TARTRATE 100 MG: 50 TABLET, FILM COATED ORAL at 08:48

## 2023-08-06 RX ADMIN — IPRATROPIUM BROMIDE AND ALBUTEROL SULFATE 3 ML: 2.5; .5 SOLUTION RESPIRATORY (INHALATION) at 11:30

## 2023-08-06 RX ADMIN — CETIRIZINE HYDROCHLORIDE 10 MG: 10 TABLET ORAL at 08:48

## 2023-08-06 RX ADMIN — Medication 1000 UNITS: at 08:48

## 2023-08-06 RX ADMIN — IPRATROPIUM BROMIDE AND ALBUTEROL SULFATE 3 ML: 2.5; .5 SOLUTION RESPIRATORY (INHALATION) at 06:26

## 2023-08-06 RX ADMIN — Medication 1 CAPSULE: at 08:48

## 2023-08-06 NOTE — PROGRESS NOTES
[facility] INPATIENT PROGRESS NOTE         [address]    2023      PATIENT IDENTIFICATION:  Name: Agnieszka Rizzo ADMIT: 2023   : 1930  PCP: Matt Rose MD    MRN: [id number] LOS: 2 days   AGE/SEX: 89 y.o. female  ROOM: [facility]                     LOS 2    Reason for visit: Acute respiratory failure with CHF, pulmonary edema      SUBJECTIVE:     Resting comfortably.  Says that she is breathing better today.  94% saturation on room air.  No other new complaints.      Objective   OBJECTIVE:    Vital Sign Min/Max for last 24 hours  Temp  Min: 97.9 øF (36.6 øC)  Max: 98.2 øF (36.8 øC)   BP  Min: 109/56  Max: 163/86   Pulse  Min: 66  Max: 108   Resp  Min: 16  Max: 20   SpO2  Min: 92 %  Max: 100 %   No data recorded   Weight  Min: 56.3 kg (124 lb 1.6 oz)  Max: 56.3 kg (124 lb 1.6 oz)                         Body mass index is 21.98 kg/mý.    Intake/Output Summary (Last 24 hours) at 2023 1005  Last data filed at 2023 1900  Gross per 24 hour   Intake --   Output 400 ml   Net -400 ml         Exam:  GEN:  No distress, appears stated age  EYES:   PERRL, anicteric sclerae  ENT:    External ears/nose normal, OP clear  NECK:  No adenopathy, midline trachea  LUNGS: Normal chest on inspection, palpation and  auscultation  CV:  Normal S1S2, without murmur  ABD:  Nontender, nondistended, no hepatosplenomegaly, +BS  EXT:  No edema.  No cyanosis or clubbing.  No mottling and normal cap refill.    Assessment     Scheduled meds:  apixaban, 2.5 mg, Oral, Q12H  budesonide-formoterol, 1 puff, Inhalation, BID - RT  cetirizine, 10 mg, Oral, Daily  cholecalciferol, 1,000 Units, Oral, Daily  citalopram, 10 mg, Oral, Daily  ferrous sulfate, 325 mg, Oral, Daily  furosemide, 40 mg, Oral, Daily  guaiFENesin, 600 mg, Oral, BID  ipratropium-albuterol, 3 mL, Nebulization, 4x Daily - RT  lactobacillus acidophilus, 1 capsule, Oral, Daily  metoprolol tartrate, 100 mg, Oral, Q12H  multivitamin with minerals, 1  tablet, Oral, BID  potassium chloride, 10 mEq, Oral, Daily  senna-docusate sodium, 2 tablet, Oral, BID      IV meds:                         Data Review:  Results from last 7 days   Lab Units 08/06/23  0746 08/05/23  0718 08/04/23  1152   SODIUM mmol/L 141 142 138   POTASSIUM mmol/L 3.5 4.4 4.3   CHLORIDE mmol/L 105 105 103   CO2 mmol/L 26.0 27.7 23.8   BUN mg/dL 22 20 10   CREATININE mg/dL 0.75 0.88 0.79   GLUCOSE mg/dL 96 162* 125*   CALCIUM mg/dL 9.0 9.2 9.4         Estimated Creatinine Clearance: 42.5 mL/min (by C-G formula based on SCr of 0.75 mg/dL).  Results from last 7 days   Lab Units 08/05/23  0718 08/04/23  1152   WBC 10*3/mm3 10.06 10.33   HEMOGLOBIN g/dL 11.2* 11.8*   PLATELETS 10*3/mm3 264 285         Results from last 7 days   Lab Units 08/04/23  1152   ALT (SGPT) U/L 35*   AST (SGOT) U/L 22         Results from last 7 days   Lab Units 08/04/23  1152   PROCALCITONIN ng/mL 0.05   LACTATE mmol/L 1.4         No results found for: HGBA1C, POCGLU    Chest x-ray 8/4 reviewed shows volume overload and interstitial edema.        Microbiology reviewed                Active Hospital Problems    Diagnosis  POA    **Acute pulmonary edema [J81.0]  Yes    Bronchiectasis [J47.9]  Yes    Paroxysmal atrial fibrillation [I48.0]  Yes    Chronic diastolic CHF (congestive heart failure) [I50.32]  Yes    Primary hypertension [I10]  Yes    Chronic coronary artery disease [I25.10]  Yes    Familial hypercholesterolemia [E78.01]  Yes      Resolved Hospital Problems   No resolved problems to display.         ASSESSMENT:  Dyspnea on exertion  Acute pulmonary edema  Bronchiectasis  Acute on chronic diastolic congestive heart failure  Chronic atrial fibrillation  Moderate asthma  Nocturnal hypoxia  Hypertension  Mitral valve regurgitation  Hypertension  Coronary artery disease  History of aspergillosis      PLAN:  Encourage pulmonary toilet.  Wean oxygen as able.  Continue scheduled bronchodilators.  On IV diuretics for heart  failure symptoms and edema.  Does not appear to be an infectious process going on and antibiotics were discontinued.  Monitor closely.          Gerhard Camilo MD  Pulmonary and Critical Care Medicine  [facility], [facility]  8/6/2023    10:05 EDT

## 2023-08-06 NOTE — PLAN OF CARE
Goal Outcome Evaluation:   Patient alert follows commands no c/o pain or nausea noted at this time. No acute distress noted. Ambulates in room and hallway with walker steady on feet. Will continue to monitor

## 2023-08-06 NOTE — NURSING NOTE
Patient did not feel comfortable taking 100 mg metoprolol based on b/p and MAR parameters. Patient swallowed 50 mg metoprolol at this time. Next b/p 116/70 patient did not want to take the other 50 mg metoprolol. HR below 100. Jacobo lcontinue to monitor

## 2023-08-06 NOTE — PROGRESS NOTES
Electrophysiology Follow-Up Note      Patient Name: Agnieszka Rizzo  Age/Sex: 89 y.o. female  : 1930  MRN: [id number]      Day of Service: 23       Chief Complaint/Follow-up: CHF    S: She is doing much better today.  Denies any shortness of breath.  She is very eager to go home.  Heart rate is well controlled, actually little bradycardic overnight.      Temp:  [97.9 øF (36.6 øC)-98.2 øF (36.8 øC)] 97.9 øF (36.6 øC)  Heart Rate:  [] 66  Resp:  [16-20] 20  BP: (109-163)/(56-94) 151/77     PHYSICAL EXAM:    General Appearance: No acute distress, well developed and well nourished.   Eyes: Conjunctiva and lids: No erythema, swelling, or discharge. Sclera non-icteric.   HENT: Atraumatic, normocephalic. External eyes, ears, and nose normal.   Respiratory: No signs of respiratory distress. Respiration rhythm and depth normal.   Clear to auscultation. No rales, crackles, rhonchi, or wheezing auscultated.   Cardiovascular:  Heart Rate and Rhythm: irregularly irregular Heart Sounds: Normal S1 and S2. No S3 or S4 noted  Gastrointestinal:  Abdomen soft, non-distended, non-tender.  Musculoskeletal: Normal movement of extremities  Skin: Warm and dry.   Psychiatric: Patient alert and oriented to person, place, and time. Speech and behavior appropriate. Normal mood and affect.      Results from last 7 days   Lab Units 23  0746 23  0718 23  1152   SODIUM mmol/L 141 142 138   POTASSIUM mmol/L 3.5 4.4 4.3   CHLORIDE mmol/L 105 105 103   CO2 mmol/L 26.0 27.7 23.8   BUN mg/dL 22 20 10   CREATININE mg/dL 0.75 0.88 0.79   GLUCOSE mg/dL 96 162* 125*   CALCIUM mg/dL 9.0 9.2 9.4     Results from last 7 days   Lab Units 23  0718 23  1152   WBC 10*3/mm3 10.06 10.33   HEMOGLOBIN g/dL 11.2* 11.8*   HEMATOCRIT % 33.2* 35.5   PLATELETS 10*3/mm3 264 285         Results from last 7 days   Lab Units 23  2301 235 23  1152   HSTROP T ng/L 19* 20* 22*               Current  Medications:   Scheduled Meds:apixaban, 2.5 mg, Oral, Q12H  budesonide-formoterol, 1 puff, Inhalation, BID - RT  cetirizine, 10 mg, Oral, Daily  cholecalciferol, 1,000 Units, Oral, Daily  citalopram, 10 mg, Oral, Daily  ferrous sulfate, 325 mg, Oral, Daily  furosemide, 40 mg, Oral, Daily  guaiFENesin, 600 mg, Oral, BID  ipratropium-albuterol, 3 mL, Nebulization, 4x Daily - RT  lactobacillus acidophilus, 1 capsule, Oral, Daily  metoprolol tartrate, 100 mg, Oral, Q12H  multivitamin with minerals, 1 tablet, Oral, BID  potassium chloride, 10 mEq, Oral, Daily  senna-docusate sodium, 2 tablet, Oral, BID            Acute pulmonary edema    Primary hypertension    Chronic coronary artery disease    Familial hypercholesterolemia    Chronic diastolic CHF (congestive heart failure)    Paroxysmal atrial fibrillation    Bronchiectasis       Plan:   CHF--- likely secondary to A-fib with RVR. She was on torsemide 20mg BID at home, recommend sending her home on torsemide 40 mg BID and increasing potassium to 20 mEq daily. Can reevaluate dose as outpatient.     Atrial fibrillation--- heart rate is much better controlled with increase metoprolol.  Actually had some bradycardia last night and they held her nighttime dose.  Recommend sending her home on 100 mg in the a.m. and 50 mg in p.m.      ---Very eager to be discharged and feels much better, denies any shortness of breath.  I think she can be discharged today with medication changes from above.    ---She should follow-up with Dr. Robbins in 1 to 2 weeks.  Advised her to call the office.          PJ Meredith  08/06/23  10:09 EDT

## 2023-08-06 NOTE — DISCHARGE SUMMARY
Patient Name: Agnieszka Rizzo  : 1930  MRN: [id number]    Date of Admission: 2023  Date of Discharge:  2023  Primary Care Physician: Matt Rose MD      Chief Complaint:   Shortness of Breath      Discharge Diagnoses     Active Hospital Problems    Diagnosis  POA    Bronchiectasis [J47.9]  Yes    Paroxysmal atrial fibrillation [I48.0]  Yes    Chronic diastolic CHF (congestive heart failure) [I50.32]  Yes    Primary hypertension [I10]  Yes    Chronic coronary artery disease [I25.10]  Yes    Familial hypercholesterolemia [E78.01]  Yes      Resolved Hospital Problems    Diagnosis Date Resolved POA    Acute pulmonary edema [J81.0] 2023 Yes        Hospital Course     Ms. Rizzo is a 89 y.o. female with a history of chronic diastolic heart failure, hypertension, hyperlipidemia, paroxysmal A-fib on Eliquis, coronary artery disease which is nonobstructive, chronic bronchiectasis with a history of MAC and Aspergillus infections who presented to [facility] initially complaining of shortness of breath.  Please see the admitting history and physical for further details.  She was found to have acute on chronic diastolic heart failure and was admitted to the hospital for further evaluation and treatment.      She was seen in consultation by pulmonology and cardiology.  She was treated with IV diuretics with quick improvement in her symptoms.  Her home diuretic dose was subsequently increased and she will be discharged home today.  She will need to follow-up with cardiology in 1 to 2 weeks as an outpatient.    Day of Discharge     Subjective:  No events overnight. She feels much better.    Physical Exam:  Temp:  [97.9 øF (36.6 øC)-98.2 øF (36.8 øC)] 97.9 øF (36.6 øC)  Heart Rate:  [] 66  Resp:  [16-20] 20  BP: (109-163)/(56-94) 151/77  Body mass index is 21.98 kg/mý.  Physical Exam  Constitutional:       General: She is not in acute distress.     Appearance: She is not  toxic-appearing.   Cardiovascular:      Rate and Rhythm: Normal rate and regular rhythm.      Heart sounds: Normal heart sounds.   Pulmonary:      Effort: Pulmonary effort is normal. No respiratory distress.      Breath sounds: Rales present. No wheezing or rhonchi.      Comments: Minimal bibasilar rales  Abdominal:      General: Bowel sounds are normal. There is no distension.      Palpations: Abdomen is soft.      Tenderness: There is no abdominal tenderness. There is no guarding or rebound.   Musculoskeletal:         General: No tenderness.      Right lower leg: No edema.      Left lower leg: No edema.   Neurological:      Mental Status: She is alert.   Psychiatric:         Mood and Affect: Mood normal.       Consultants     Consult Orders (all) (From admission, onward)       Start     Ordered    08/04/23 1602  Inpatient Cardiology Consult  Once        Specialty:  Cardiology  Provider:  Marcie Robbins MD    08/04/23 1601    08/04/23 1602  Inpatient Pulmonology Consult  Once        Specialty:  Pulmonary Disease  Provider:  Rogelio Tucker MD    08/04/23 1601    08/04/23 1508  Inpatient Spiritual Care Consult  Once        Provider:  (Not yet assigned)    08/04/23 1508    08/04/23 1504  Inpatient Case Management  Consult  Once        Provider:  (Not yet assigned)    08/04/23 1503    08/04/23 1314  [facility] (on-call MD unless specified) Details  Once        Specialty:  Hospitalist  Provider:  Carlene Mooney MD    08/04/23 1313                  Procedures     Imaging Results (All)       Procedure Component Value Units Date/Time    XR Chest 1 View [[id number]] Collected: 08/04/23 1200     Updated: 08/04/23 1204    Narrative:      PORTABLE CHEST X-RAY     HISTORY: Shortness of breath.     TECHNIQUE: Portable chest x-ray is correlated with chest x-ray  04/08/2023.     FINDINGS: There is cardiomegaly with central pulmonary vascular  engorgement and interstitial edema. No pleural effusion. Atelectasis  at  the right lung base. Old healed left clavicle fracture.       Impression:      Heart failure or volume overload with interstitial edema.     This report was finalized on 8/4/2023 12:01 PM by Dr. Geovanny Pollard M.D.               Pertinent Labs     Results from last 7 days   Lab Units 08/05/23  0718 08/04/23  1152   WBC 10*3/mm3 10.06 10.33   HEMOGLOBIN g/dL 11.2* 11.8*   PLATELETS 10*3/mm3 264 285     Results from last 7 days   Lab Units 08/06/23  0746 08/05/23  0718 08/04/23  1152   SODIUM mmol/L 141 142 138   POTASSIUM mmol/L 3.5 4.4 4.3   CHLORIDE mmol/L 105 105 103   CO2 mmol/L 26.0 27.7 23.8   BUN mg/dL 22 20 10   CREATININE mg/dL 0.75 0.88 0.79   GLUCOSE mg/dL 96 162* 125*   Estimated Creatinine Clearance: 42.5 mL/min (by C-G formula based on SCr of 0.75 mg/dL).  Results from last 7 days   Lab Units 08/04/23  1152   ALBUMIN g/dL 3.5   BILIRUBIN mg/dL 1.2   ALK PHOS U/L 113   AST (SGOT) U/L 22   ALT (SGPT) U/L 35*     Results from last 7 days   Lab Units 08/06/23  0746 08/05/23  0718 08/04/23  1152   CALCIUM mg/dL 9.0 9.2 9.4   ALBUMIN g/dL  --   --  3.5   MAGNESIUM mg/dL 2.4*  --   --        Results from last 7 days   Lab Units 08/04/23  2301 08/04/23  2055 08/04/23  1152   HSTROP T ng/L 19* 20* 22*   PROBNP pg/mL  --   --  3,720.0*           Invalid input(s): LDLCALC  Results from last 7 days   Lab Units 08/04/23  2141 08/04/23  1225 08/04/23  1152   BLOODCX   --  No growth at 24 hours No growth at 24 hours   RESPCX  Light growth (2+) The culture consists of normal respiratory fortino. This is a preliminary report; final report to follow.  --   --        Test Results Pending at Discharge     Pending Labs       Order Current Status    Blood Culture - Blood, Arm, Left Preliminary result    Blood Culture - Blood, Arm, Right Preliminary result    Respiratory Culture - Sputum, Cough Preliminary result            Discharge Details        Discharge Medications        New Medications        Instructions Start  Date   potassium chloride 20 MEQ CR tablet  Commonly known as: K-DUR,KLOR-CON  Replaces: potassium chloride 10 MEQ CR capsule   20 mEq, Oral, Daily   Start Date: August 7, 2023            Changes to Medications        Instructions Start Date   FeroSul 325 (65 FE) MG tablet  Generic drug: ferrous sulfate  What changed:   how much to take  when to take this   TAKE ONE TABLET BY MOUTH DAILY WITH BREAKFAST      metoprolol tartrate 50 MG tablet  Commonly known as: LOPRESSOR  What changed: You were already taking a medication with the same name, and this prescription was added. Make sure you understand how and when to take each.   50 mg, Oral, Nightly      metoprolol tartrate 100 MG tablet  Commonly known as: LOPRESSOR  What changed:   medication strength  how much to take  when to take this   100 mg, Oral, Every Morning   Start Date: August 7, 2023     torsemide 20 MG tablet  Commonly known as: DEMADEX  What changed: how much to take   40 mg, Oral, 2 Times Daily             Continue These Medications        Instructions Start Date   acetaminophen 325 MG tablet  Commonly known as: TYLENOL   650 mg, Oral, Every 4 Hours PRN      albuterol sulfate  (90 Base) MCG/ACT inhaler  Commonly known as: PROVENTIL HFA;VENTOLIN HFA;PROAIR HFA   2 puffs, Inhalation, Every 6 Hours PRN      apixaban 2.5 MG tablet tablet  Commonly known as: ELIQUIS   2.5 mg, Oral, Every 12 Hours Scheduled      azithromycin 250 MG tablet  Commonly known as: ZITHROMAX   250 mg, Oral, Daily      benzonatate 200 MG capsule  Commonly known as: TESSALON   1 capsule, Oral, 3 Times Daily PRN      cholecalciferol 25 MCG (1000 UT) tablet  Commonly known as: VITAMIN D3   1,000 Units, Oral, Daily      citalopram 10 MG tablet  Commonly known as: CeleXA   TAKE 1 TABLET DAILY      fexofenadine 180 MG tablet  Commonly known as: ALLEGRA   180 mg, Oral, Daily      Florajen Acidophilus capsule   1 tablet, Oral, Daily      fluticasone 50 MCG/ACT nasal spray  Commonly  known as: FLONASE   2 sprays, Nasal, Nightly PRN      fluticasone-salmeterol 115-21 MCG/ACT inhaler  Commonly known as: ADVAIR HFA   2 puffs, Inhalation, 2 Times Daily - RT      glucosamine-chondroitin 500-400 MG capsule capsule   1 capsule, Oral, Daily      guaiFENesin 600 MG 12 hr tablet  Commonly known as: MUCINEX   600 mg, Oral, 2 Times Daily      ipratropium-albuterol 0.5-2.5 mg/3 ml nebulizer  Commonly known as: DUO-NEB   3 mL, Nebulization, 2 Times Daily      multivitamin with minerals tablet tablet   1 tablet, Oral, 2 Times Daily      sodium chloride 0.9 % nebulizer solution   3 mL, Nebulization, 2 Times Daily             Stop These Medications      potassium chloride 10 MEQ CR capsule  Commonly known as: MICRO-K  Replaced by: potassium chloride 20 MEQ CR tablet              Allergies   Allergen Reactions    Amlodipine Besylate Swelling    Aspirin GI Intolerance     Caused bleeding ulcers    Bactrim [Sulfamethoxazole-Trimethoprim] Nausea And Vomiting    Erythromycin Unknown (See Comments)     Patient does not recall type of reaction.    Levaquin [Levofloxacin] Unknown (See Comments)     Nausea      Nitrofurantoin Nausea Only and Nausea And Vomiting    Ramipril Other (See Comments)     Cough         Discharge Disposition:  Home or Self Care    Discharge Diet:  Diet Order   Procedures    Diet: Cardiac Diets; Healthy Heart (2-3 Na+); Texture: Regular Texture (IDDSI 7); Fluid Consistency: Thin (IDDSI 0)       Discharge Activity:   Activity Instructions       Activity as Tolerated              CODE STATUS:    Code Status and Medical Interventions:   Ordered at: 08/04/23 1531     Code Status (Patient has no pulse and is not breathing):    CPR (Attempt to Resuscitate)     Medical Interventions (Patient has pulse or is breathing):    Full     Release to patient:    Routine Release       Future Appointments   Date Time Provider Department Center   10/19/2023 10:40 AM Marcie Robbins MD [facility]      Additional Instructions for the Follow-ups that You Need to Schedule       Call MD With Problems / Concerns   As directed      Instructions: return to the hospital if you experience chest pain, shortness of breath, abdominal pain, nausea, vomiting, fevers, sweats, chills, or worsening of your symptoms    Order Comments: Instructions: return to the hospital if you experience chest pain, shortness of breath, abdominal pain, nausea, vomiting, fevers, sweats, chills, or worsening of your symptoms         Discharge Follow-up with PCP   As directed       Currently Documented PCP:    Matt Rose MD    PCP Phone Number:    [phone]     Follow Up Details: 2 weeks        Discharge Follow-up with Specialty: cardiology 1-2 weeks or as otherwise directed   As directed      Specialty: cardiology 1-2 weeks or as otherwise directed               Follow-up Information       Matt Rsoe MD .    Specialty: Family Medicine  Why: 2 weeks  Contact information:  [id number] [id number] [phone]                             Additional Instructions for the Follow-ups that You Need to Schedule       Call MD With Problems / Concerns   As directed      Instructions: return to the hospital if you experience chest pain, shortness of breath, abdominal pain, nausea, vomiting, fevers, sweats, chills, or worsening of your symptoms    Order Comments: Instructions: return to the hospital if you experience chest pain, shortness of breath, abdominal pain, nausea, vomiting, fevers, sweats, chills, or worsening of your symptoms         Discharge Follow-up with PCP   As directed       Currently Documented PCP:    Matt Rose MD    PCP Phone Number:    [phone]     Follow Up Details: 2 weeks        Discharge Follow-up with Specialty: cardiology 1-2 weeks or as otherwise directed   As directed      Specialty: cardiology 1-2 weeks or as otherwise directed            Time Spent on Discharge:  Greater  than 30 minutes      Jonnie Daniels MD  [facility]  08/06/23  10:22 EDT

## 2023-08-06 NOTE — OUTREACH NOTE
Prep Survey      Flowsheet Row Responses   [facility] patient discharged from? [facility]   Is LACE score < 7 ? No   Eligibility [facility]   Date of Admission 08/04/23   Date of Discharge 08/06/23   Discharge Disposition Home or Self Care   Discharge diagnosis A/C CHF, A-fib   Does the patient have one of the following disease processes/diagnoses(primary or secondary)? CHF   Does the patient have Home health ordered? No   Is there a DME ordered? No   Prep survey completed? Yes            Ledy MATOS - Registered Nurse

## 2023-08-06 NOTE — PLAN OF CARE
Pt oriented, vs stable Afib, NAD Pt to call daughter for transport      Problem: Adult Inpatient Plan of Care  Goal: Plan of Care Review  Outcome: Met  Goal: Patient-Specific Goal (Individualized)  Outcome: Met  Goal: Absence of Hospital-Acquired Illness or Injury  Outcome: Met  Intervention: Identify and Manage Fall Risk  Recent Flowsheet Documentation  Taken 8/6/2023 0845 by Pat Bolaños RN  Safety Promotion/Fall Prevention:   activity supervised   assistive device/personal items within reach   clutter free environment maintained   fall prevention program maintained   nonskid shoes/slippers when out of bed   room organization consistent   safety round/check completed  Intervention: Prevent Skin Injury  Recent Flowsheet Documentation  Taken 8/6/2023 1000 by Pat Bolaños RN  Body Position: position changed independently  Taken 8/6/2023 0845 by Pat Bolaños RN  Body Position: position changed independently  Intervention: Prevent and Manage VTE (Venous Thromboembolism) Risk  Recent Flowsheet Documentation  Taken 8/6/2023 0845 by Pat Bolaños RN  Activity Management: up ad mira  VTE Prevention/Management: (Eliquis) other (see comments)  Range of Motion: active ROM (range of motion) encouraged  Intervention: Prevent Infection  Recent Flowsheet Documentation  Taken 8/6/2023 0845 by Pat Bolaños RN  Infection Prevention:   rest/sleep promoted   single patient room provided  Goal: Optimal Comfort and Wellbeing  Outcome: Met  Intervention: Provide Person-Centered Care  Recent Flowsheet Documentation  Taken 8/6/2023 0845 by Pat Bolaños RN  Trust Relationship/Rapport:   care explained   thoughts/feelings acknowledged   reassurance provided   questions encouraged   questions answered   emotional support provided  Goal: Readiness for Transition of Care  Outcome: Met     Problem: Fall Injury Risk  Goal: Absence of Fall and Fall-Related Injury  Outcome: Met  Intervention: Identify and Manage  Contributors  Recent Flowsheet Documentation  Taken 8/6/2023 0845 by Pat Bolaños RN  Medication Review/Management: medications reviewed  Intervention: Promote Injury-Free Environment  Recent Flowsheet Documentation  Taken 8/6/2023 0845 by Pat Bolaños RN  Safety Promotion/Fall Prevention:   activity supervised   assistive device/personal items within reach   clutter free environment maintained   fall prevention program maintained   nonskid shoes/slippers when out of bed   room organization consistent   safety round/check completed     Problem: Asthma Comorbidity  Goal: Maintenance of Asthma Control  Outcome: Met  Intervention: Maintain Asthma Symptom Control  Recent Flowsheet Documentation  Taken 8/6/2023 0845 by Pat Bolaños RN  Medication Review/Management: medications reviewed     Problem: Heart Failure Comorbidity  Goal: Maintenance of Heart Failure Symptom Control  Outcome: Met  Intervention: Maintain Heart Failure-Management  Recent Flowsheet Documentation  Taken 8/6/2023 0845 by Pat Bolaños RN  Medication Review/Management: medications reviewed     Problem: Hypertension Comorbidity  Goal: Blood Pressure in Desired Range  Outcome: Met  Intervention: Maintain Blood Pressure Management  Recent Flowsheet Documentation  Taken 8/6/2023 0845 by Pat Bolaños RN  Medication Review/Management: medications reviewed   Goal Outcome Evaluation:

## 2023-08-06 NOTE — NURSING NOTE
Patient's heart rate has been 50-70 since 2300, with pauses. Asymptomatic. Patient did not take the other 50 mg metoprolol. Patient only took a total of 50 mg at 2030. Will continue to monitor

## 2023-08-07 ENCOUNTER — TRANSITIONAL CARE MANAGEMENT TELEPHONE ENCOUNTER (OUTPATIENT)
Dept: CALL CENTER | Facility: HOSPITAL | Age: 88
End: 2023-08-07
Payer: MEDICARE

## 2023-08-07 NOTE — CASE MANAGEMENT/SOCIAL WORK
Case Management Discharge Note      Final Note: Discharged to home. Taryn BAÑUELOS RN CCP         Selected Continued Care - Discharged on 8/6/2023 Admission date: 8/4/2023 - Discharge disposition: Home or Self Care      Destination    No services have been selected for the patient.                Durable Medical Equipment    No services have been selected for the patient.                Dialysis/Infusion    No services have been selected for the patient.                Home Medical Care    No services have been selected for the patient.                Therapy    No services have been selected for the patient.                Community Resources    No services have been selected for the patient.                Community & DME    No services have been selected for the patient.                    Transportation Services  Private: Car    Final Discharge Disposition Code: 01 - home or self-care

## 2023-08-07 NOTE — OUTREACH NOTE
Call Center TCM Note      Flowsheet Row Responses   [facility] [place] patient discharged from? [facility]   Does the patient have one of the following disease processes/diagnoses(primary or secondary)? CHF   TCM attempt successful? Yes   Call start time 1109   Call end time 1114   Discharge diagnosis A/C CHF, A-fib   Is patient permission given to speak with other caregiver? Yes   Person spoke with today (if not patient) and relationship Spoke with pt and dtr Michelle who was with pt. NO CURRENT VERBAL RELEASE IN CHART FOR PCP   Meds reviewed with patient/caregiver? Yes   Is the patient having any side effects they believe may be caused by any medication additions or changes? No   Does the patient have all medications ordered at discharge? Yes   Is the patient taking all medications as directed (includes completed medication regime)? Yes   Comments TCM APPT WITH PCP DR COTTER IS 08/16/2023   Does the patient have an appointment with their PCP within 7-14 days of discharge? Yes   Has home health visited the patient within 72 hours of discharge? N/A   Pulse Ox monitoring None   Psychosocial issues? No   Did the patient receive a copy of their discharge instructions? Yes   What is the patient's perception of their health status since discharge? Improving   If the patient is a current smoker, are they able to teach back resources for cessation? Not a smoker   Is the patient/caregiver able to teach back the hierarchy of who to call/visit for symptoms/problems? PCP, Specialist, Home health nurse, Urgent Care, ED, 911 Yes   Is the patient able to teach back Heart Failure Zones? Yes   TCM call completed? Yes   Wrap up additional comments D/C DX: A/C CHF, A-fib - Spoke with pt and her dtr Michelle who was with pt. Pt is feeling much better, slept well at home last night. SOB much improved. New rx K-DUR, and changes to Metoprolol tartrate, Torsemide in place. Micro-K discontinued. No questions at this time. TCM APPT with PCP Dr Cotter  has been sched for 08/16/2023.   Call end time 1114             Agnieszka Silva MA    8/7/2023, 11:28 EDT

## 2023-08-08 ENCOUNTER — TELEPHONE (OUTPATIENT)
Dept: CARDIOLOGY | Facility: CLINIC | Age: 88
End: 2023-08-08

## 2023-08-08 NOTE — TELEPHONE ENCOUNTER
Caller: Michelle Zaldivar    Relationship to patient: Emergency Contact    Best call back number: [phone]    New or established patient?  [x] New  [] Established    Date of discharge: 08/6/23    Facility discharged from: [facility]    Diagnosis/Symptoms: SOB, CONGESTIVE HEART FAILURE    Length of stay (If applicable): 2    HOSPITAL FOLLOW WAS FOR 1-2 WEEKS. SCHEDULED PATIENT FOR FIRST AVAILABLE. PLEASE REACH OUT TO PATIENT'S DAUGHTER FOR SOONER APPT.    Specialty Only: Did you see a [facility] provider?    [x] Yes  [] No  If so, who? DR BRIGHT ACOSTA

## 2023-08-09 LAB
BACTERIA SPEC AEROBE CULT: NORMAL
BACTERIA SPEC AEROBE CULT: NORMAL

## 2023-08-16 ENCOUNTER — READMISSION MANAGEMENT (OUTPATIENT)
Dept: CALL CENTER | Facility: HOSPITAL | Age: 88
End: 2023-08-16
Payer: MEDICARE

## 2023-08-16 ENCOUNTER — OFFICE VISIT (OUTPATIENT)
Dept: INTERNAL MEDICINE | Facility: CLINIC | Age: 88
End: 2023-08-16
Payer: MEDICARE

## 2023-08-16 VITALS
HEART RATE: 88 BPM | OXYGEN SATURATION: 95 % | SYSTOLIC BLOOD PRESSURE: 110 MMHG | BODY MASS INDEX: 22.02 KG/M2 | HEIGHT: 63 IN | DIASTOLIC BLOOD PRESSURE: 56 MMHG | WEIGHT: 124.3 LBS

## 2023-08-16 DIAGNOSIS — I10 PRIMARY HYPERTENSION: ICD-10-CM

## 2023-08-16 DIAGNOSIS — J81.0 ACUTE PULMONARY EDEMA: ICD-10-CM

## 2023-08-16 DIAGNOSIS — E87.8 ELECTROLYTE IMBALANCE: ICD-10-CM

## 2023-08-16 DIAGNOSIS — Z66 DNR (DO NOT RESUSCITATE): Primary | ICD-10-CM

## 2023-08-16 RX ORDER — EZETIMIBE 10 MG/1
TABLET ORAL
COMMUNITY
End: 2023-08-16

## 2023-08-16 RX ORDER — CITALOPRAM HYDROBROMIDE 10 MG/1
10 TABLET ORAL DAILY
Qty: 90 TABLET | Refills: 2 | Status: SHIPPED | OUTPATIENT
Start: 2023-08-16

## 2023-08-16 NOTE — OUTREACH NOTE
CHF Week 2 Survey      Flowsheet Row Responses   [facility] patient discharged from? [place]   Does the patient have one of the following disease processes/diagnoses(primary or secondary)? CHF   Week 2 attempt successful? No   Unsuccessful attempts Attempt 1            FREDO MILLIGAN - Registered Nurse

## 2023-08-16 NOTE — PROGRESS NOTES
Transitional Care Follow Up Visit  Subjective     Agneiszka Rizzo is a 89 y.o. female who presents for a transitional care management visit.    Within 48 business hours after discharge our office contacted her via telephone to coordinate her care and needs.      I reviewed and discussed the details of that call along with the discharge summary, hospital problems, inpatient lab results, inpatient diagnostic studies, and consultation reports with Agnieszka.     Current outpatient and discharge medications have been reconciled for the patient.  Reviewed by: Matt Rose MD          8/6/2023     5:05 PM   Date of TCM Phone Call   [facility]   Date of Admission 8/4/2023   Date of Discharge 8/6/2023   Discharge Disposition Home or Self Care     Risk for Readmission (LACE) Score: 10 (8/6/2023  6:00 AM)      History of Present Illness   Course During Hospital Stay:   She was found to have acute on chronic diastolic heart failure  She was treated with IV diuretics with quick improvement in her symptoms. Her home diuretic dose was subsequently increased      She was discharged on metoprolol 50 mg nightly 100 mg daily  Increase torsemide to 40 mg twice a day  Increase potassium to 20 mEq daily    She is feeling better  The following portions of the patient's history were reviewed and updated as appropriate: allergies, current medications, past family history, past medical history, past social history, past surgical history, and problem list.    Review of Systems   Constitutional:  Negative for appetite change, fever and unexpected weight change.   HENT:  Negative for ear pain, facial swelling and sore throat.    Eyes:  Negative for pain and visual disturbance.   Respiratory:  Negative for cough, chest tightness, shortness of breath and wheezing.    Cardiovascular:  Negative for chest pain and palpitations.   Gastrointestinal:  Negative for abdominal pain and blood in stool.   Endocrine: Negative.    Genitourinary:   Negative for difficulty urinating and flank pain.   Musculoskeletal:  Negative for joint swelling.   Neurological:  Negative for tremors, seizures and syncope.   Hematological:  Negative for adenopathy.   Psychiatric/Behavioral: Negative.       Objective   Physical Exam  Vitals and nursing note reviewed.   Constitutional:       Appearance: Normal appearance.   HENT:      Head: Normocephalic and atraumatic.   Cardiovascular:      Rate and Rhythm: Normal rate and regular rhythm.      Pulses: Normal pulses.      Heart sounds: Normal heart sounds.   Pulmonary:      Breath sounds: Rhonchi present.   Abdominal:      Tenderness: There is no right CVA tenderness or left CVA tenderness.   Musculoskeletal:      Right lower leg: No edema.      Left lower leg: No edema.   Skin:     General: Skin is warm and dry.   Neurological:      Mental Status: She is alert.   Psychiatric:         Mood and Affect: Mood normal.         Behavior: Behavior normal.         Thought Content: Thought content normal.         Judgment: Judgment normal.       Assessment & Plan   Diagnoses and all orders for this visit:    1. DNR (do not resuscitate) (Primary)    2. Electrolyte imbalance  -     Basic Metabolic Panel    3. Primary hypertension    4. Acute pulmonary edema    Other orders  -     citalopram (CeleXA) 10 MG tablet; Take 1 tablet by mouth Daily.  Dispense: 90 tablet; Refill: 2      Monitor blood pressure goals less than 140/90  Follow-up results of blood work otherwise as needed or 3 months

## 2023-08-21 ENCOUNTER — LAB (OUTPATIENT)
Dept: LAB | Facility: HOSPITAL | Age: 88
End: 2023-08-21
Payer: MEDICARE

## 2023-08-21 ENCOUNTER — OFFICE VISIT (OUTPATIENT)
Dept: CARDIOLOGY | Facility: CLINIC | Age: 88
End: 2023-08-21
Payer: MEDICARE

## 2023-08-21 VITALS
WEIGHT: 125.8 LBS | HEART RATE: 86 BPM | HEIGHT: 63 IN | DIASTOLIC BLOOD PRESSURE: 74 MMHG | BODY MASS INDEX: 22.29 KG/M2 | OXYGEN SATURATION: 95 % | SYSTOLIC BLOOD PRESSURE: 124 MMHG

## 2023-08-21 DIAGNOSIS — I50.32 CHRONIC DIASTOLIC CHF (CONGESTIVE HEART FAILURE): ICD-10-CM

## 2023-08-21 DIAGNOSIS — I48.11 LONGSTANDING PERSISTENT ATRIAL FIBRILLATION: Primary | ICD-10-CM

## 2023-08-21 DIAGNOSIS — I10 BENIGN ESSENTIAL HYPERTENSION: ICD-10-CM

## 2023-08-21 DIAGNOSIS — I25.10 CHRONIC CORONARY ARTERY DISEASE: ICD-10-CM

## 2023-08-21 LAB
ANION GAP SERPL CALCULATED.3IONS-SCNC: 11 MMOL/L (ref 5–15)
BUN SERPL-MCNC: 28 MG/DL (ref 8–23)
BUN/CREAT SERPL: 30.1 (ref 7–25)
CALCIUM SPEC-SCNC: 9.3 MG/DL (ref 8.2–9.6)
CHLORIDE SERPL-SCNC: 101 MMOL/L (ref 98–107)
CO2 SERPL-SCNC: 28 MMOL/L (ref 22–29)
CREAT SERPL-MCNC: 0.93 MG/DL (ref 0.57–1)
EGFRCR SERPLBLD CKD-EPI 2021: 57.4 ML/MIN/1.73
GLUCOSE SERPL-MCNC: 109 MG/DL (ref 65–99)
POTASSIUM SERPL-SCNC: 4.1 MMOL/L (ref 3.5–5.2)
SODIUM SERPL-SCNC: 140 MMOL/L (ref 136–145)

## 2023-08-21 PROCEDURE — 80048 BASIC METABOLIC PNL TOTAL CA: CPT | Performed by: FAMILY MEDICINE

## 2023-08-21 PROCEDURE — 99214 OFFICE O/P EST MOD 30 MIN: CPT | Performed by: NURSE PRACTITIONER

## 2023-08-21 RX ORDER — METOPROLOL TARTRATE 100 MG/1
100 TABLET ORAL EVERY MORNING
Qty: 90 TABLET | Refills: 3 | Status: SHIPPED | OUTPATIENT
Start: 2023-08-21 | End: 2024-08-15

## 2023-08-21 RX ORDER — TORSEMIDE 20 MG/1
40 TABLET ORAL 2 TIMES DAILY
Qty: 360 TABLET | Refills: 3 | Status: SHIPPED | OUTPATIENT
Start: 2023-08-21 | End: 2024-08-15

## 2023-08-21 RX ORDER — METOPROLOL TARTRATE 50 MG/1
50 TABLET, FILM COATED ORAL NIGHTLY
Qty: 90 TABLET | Refills: 3 | Status: SHIPPED | OUTPATIENT
Start: 2023-08-21 | End: 2024-08-15

## 2023-08-21 RX ORDER — POTASSIUM CHLORIDE 20 MEQ/1
20 TABLET, EXTENDED RELEASE ORAL DAILY
Qty: 90 TABLET | Refills: 3 | Status: SHIPPED | OUTPATIENT
Start: 2023-08-21

## 2023-08-21 NOTE — PROGRESS NOTES
"    CARDIOLOGY        Patient Name: Agnieszka Rizzo  :1930  Age: 89 y.o.  Primary Cardiologist: Marcie Robbins MD  Encounter Provider:  PJ Santillan    Date of Service: 23      CHIEF COMPLAINT / REASON FOR OFFICE VISIT     Hospital Follow Up Visit (2023)      HISTORY OF PRESENT ILLNESS       HPI  Agnieszka Rizzo is a 89 y.o. female who presents today for hospital follow-up.     Pt has a  history significant for PAF, diastolic heart failure, hypertension, chronic CAD.    Review of medical records reveals patient hospitalized with discharge date 2023.  Patient was admitted with dyspnea.  She was found to have acute on chronic diastolic heart failure.  Patient was treated with IV diuretics and symptoms improved quickly.  Home diuretic regimen was increased.  She presents today for follow-up.    Patient reports that she does continue to have intermittent episodes of shortness of breath with minimal exertion.  She adds complaints of generalized fatigue.  She has also notes a new loss of balance.  PCP has ordered repeat labs, that are due to be drawn today.  Denies chest pain, edema, palpitations.       The following portions of the patient's history were reviewed and updated as appropriate: allergies, current medications, past family history, past medical history, past social history, past surgical history and problem list.      VITAL SIGNS     Visit Vitals  /74 (BP Location: Left arm)   Pulse 86   Ht 160 cm (63\")   Wt 57.1 kg (125 lb 12.8 oz)   SpO2 95%   BMI 22.28 kg/mý         Wt Readings from Last 3 Encounters:   23 57.1 kg (125 lb 12.8 oz)   23 56.4 kg (124 lb 4.8 oz)   23 56.3 kg (124 lb 1.6 oz)     Body mass index is 22.28 kg/mý.      REVIEW OF SYSTEMS   Review of Systems   Constitutional: Positive for malaise/fatigue. Negative for chills, fever, weight gain and weight loss.   Cardiovascular:  Negative for leg swelling.   Respiratory:  Negative for cough, " snoring and wheezing.    Hematologic/Lymphatic: Negative for bleeding problem. Does not bruise/bleed easily.   Skin:  Negative for color change.   Musculoskeletal:  Negative for falls, joint pain and myalgias.   Gastrointestinal:  Negative for melena.   Genitourinary:  Negative for hematuria.   Neurological:  Positive for loss of balance and weakness. Negative for excessive daytime sleepiness.   Psychiatric/Behavioral:  Negative for depression. The patient is not nervous/anxious.          PHYSICAL EXAMINATION     Vitals and nursing note reviewed.   Constitutional:       Appearance: Normal appearance. Well-developed.   Eyes:      Conjunctiva/sclera: Conjunctivae normal.   Neck:      Vascular: No carotid bruit.   Pulmonary:      Breath sounds: Normal breath sounds.   Cardiovascular:      Normal rate. Irregularly irregular rhythm. Normal S1 with normal intensity. Normal S2 with normal intensity.       Murmurs: There is no murmur.      No gallop.  No click. No rub.   Edema:     Peripheral edema absent.   Musculoskeletal: Normal range of motion. Skin:     General: Skin is warm and dry.   Neurological:      Mental Status: Alert and oriented to person, place, and time.      GCS: GCS eye subscore is 4. GCS verbal subscore is 5. GCS motor subscore is 6.   Psychiatric:         Speech: Speech normal.         Behavior: Behavior normal.         Thought Content: Thought content normal.         Judgment: Judgment normal.         REVIEWED DATA     Procedures    Cardiac Procedures:  Echocardiogram 7/12/2022.  LVEF 55%.  Moderate hypertrophy.  Left atrial volume is moderately increased.  Right atrial cavity is severely dilated.  Moderate mitral valve regurgitation.  Moderate tricuspid valve regurgitation.  Calculated RVSP 31 mmHg.  Zio monitor 1/25/2023.  Noted to be in persistent atrial fibrillation.        Lab Results   Component Value Date     08/06/2023     08/05/2023    K 3.5 08/06/2023    K 4.4 08/05/2023      08/06/2023     08/05/2023    CO2 26.0 08/06/2023    CO2 27.7 08/05/2023    BUN 22 08/06/2023    BUN 20 08/05/2023    CREATININE 0.75 08/06/2023    CREATININE 0.88 08/05/2023    EGFRIFNONA 67 08/27/2021    EGFRIFNONA 60 (L) 03/04/2021    EGFRIFAFRI 87 11/14/2019    EGFRIFAFRI 96 06/20/2017    GLUCOSE 96 08/06/2023    GLUCOSE 162 (H) 08/05/2023    CALCIUM 9.0 08/06/2023    CALCIUM 9.2 08/05/2023    ALBUMIN 3.5 08/04/2023    ALBUMIN 4.2 04/03/2023    BILITOT 1.2 08/04/2023    BILITOT 1.1 04/03/2023    AST 22 08/04/2023    AST 22 04/03/2023    ALT 35 (H) 08/04/2023    ALT 16 04/03/2023     Lab Results   Component Value Date    WBC 10.06 08/05/2023    WBC 10.33 08/04/2023    HGB 11.2 (L) 08/05/2023    HGB 11.8 (L) 08/04/2023    HCT 33.2 (L) 08/05/2023    HCT 35.5 08/04/2023    MCV 95.1 08/05/2023    MCV 96.5 08/04/2023     08/05/2023     08/04/2023     Lab Results   Component Value Date    PROBNP 3,720.0 (H) 08/04/2023    PROBNP 2,532.0 (H) 04/07/2023     Lab Results   Component Value Date    CKTOTAL 66 02/08/2017    CKMB 2.98 02/08/2017    TROPONINT 19 (H) 08/04/2023     Lab Results   Component Value Date    TSH 3.100 08/27/2021    TSH 1.630 05/31/2017             ASSESSMENT & PLAN     Diagnoses and all orders for this visit:    1. Longstanding persistent atrial fibrillation (Primary)  Patient with longstanding persistent atrial fibrillation.  During hospitalization heart rates were elevated and her regimen was adjusted as results  Heart rate has been more controlled  Recommend continuing metoprolol tartrate 100 mg in the morning and 50 mg in the evening  Anticoagulated with low-dose apixaban    2. Benign essential hypertension  Blood pressure controlled in clinic today  Continue metoprolol tartrate, torsemide  -     torsemide (DEMADEX) 20 MG tablet; Take 2 tablets by mouth 2 (Two) Times a Day for 360 days.  Dispense: 360 tablet; Refill: 3    3. Chronic coronary artery disease  Denies angina or  dyspnea  Continue metoprolol tartrate    4. Chronic diastolic CHF (congestive heart failure) (HCC)  Lung sounds clear, euvolemic on exam  Shortness of breath has improved however she still has intermittent shortness of breath with exertion  Recommend continuing torsemide 40 mg twice daily.  Patient will have outpatient BMP drawn today as ordered by her PCP.    Other orders  -     metoprolol tartrate (LOPRESSOR) 100 MG tablet; Take 1 tablet by mouth Every Morning for 360 days.  Dispense: 90 tablet; Refill: 3  -     metoprolol tartrate (LOPRESSOR) 50 MG tablet; Take 1 tablet by mouth Every Night for 360 days.  Dispense: 90 tablet; Refill: 3  -     potassium chloride (K-DUR,KLOR-CON) 20 MEQ CR tablet; Take 1 tablet by mouth Daily.  Dispense: 90 tablet; Refill: 3          Return for Next scheduled follow up.    Future Appointments         Provider Department Center    10/19/2023 10:40 AM Marcie Robbins MD [facility]                  MEDICATIONS         Discharge Medications            Accurate as of August 21, 2023  1:43 PM. If you have any questions, ask your nurse or doctor.                Changes to Medications        Instructions Start Date   FeroSul 325 (65 FE) MG tablet  Generic drug: ferrous sulfate  What changed:   how much to take  when to take this   TAKE ONE TABLET BY MOUTH DAILY WITH BREAKFAST             Continue These Medications        Instructions Start Date   acetaminophen 325 MG tablet  Commonly known as: TYLENOL   650 mg, Oral, Every 4 Hours PRN      albuterol sulfate  (90 Base) MCG/ACT inhaler  Commonly known as: PROVENTIL HFA;VENTOLIN HFA;PROAIR HFA   2 puffs, Inhalation, Every 6 Hours PRN      apixaban 2.5 MG tablet tablet  Commonly known as: ELIQUIS   2.5 mg, Oral, Every 12 Hours Scheduled      azithromycin 250 MG tablet  Commonly known as: ZITHROMAX   250 mg, Oral, Daily      benzonatate 200 MG capsule  Commonly known as: TESSALON   1 capsule, Oral, 3  Times Daily PRN      cholecalciferol 25 MCG (1000 UT) tablet  Commonly known as: VITAMIN D3   1,000 Units, Oral, Daily      citalopram 10 MG tablet  Commonly known as: CeleXA   10 mg, Oral, Daily      fexofenadine 180 MG tablet  Commonly known as: ALLEGRA   180 mg, Oral, Daily      Florajen Acidophilus capsule   1 tablet, Oral, Daily      fluticasone 50 MCG/ACT nasal spray  Commonly known as: FLONASE   2 sprays, Nasal, Nightly PRN      fluticasone-salmeterol 115-21 MCG/ACT inhaler  Commonly known as: ADVAIR HFA   2 puffs, Inhalation, 2 Times Daily - RT      guaiFENesin 600 MG 12 hr tablet  Commonly known as: MUCINEX   600 mg, Oral, 2 Times Daily      ipratropium-albuterol 0.5-2.5 mg/3 ml nebulizer  Commonly known as: DUO-NEB   3 mL, Nebulization, 2 Times Daily      metoprolol tartrate 100 MG tablet  Commonly known as: LOPRESSOR   100 mg, Oral, Every Morning      metoprolol tartrate 50 MG tablet  Commonly known as: LOPRESSOR   50 mg, Oral, Nightly      multivitamin with minerals tablet tablet   1 tablet, Oral, 2 Times Daily      potassium chloride 20 MEQ CR tablet  Commonly known as: K-DUR,KLOR-CON   20 mEq, Oral, Daily      sodium chloride 0.9 % nebulizer solution   3 mL, Nebulization, 2 Times Daily      torsemide 20 MG tablet  Commonly known as: DEMADEX   40 mg, Oral, 2 Times Daily                   **Dragon Disclaimer:   Much of this encounter note is an electronic transcription/translation of spoken language to printed text. The electronic translation of spoken language may permit erroneous, or at times, nonsensical words or phrases to be inadvertently transcribed. Although I have reviewed the note for such errors, some may still exist.

## 2023-08-30 ENCOUNTER — TELEPHONE (OUTPATIENT)
Dept: INTERNAL MEDICINE | Facility: CLINIC | Age: 88
End: 2023-08-30

## 2023-08-30 NOTE — TELEPHONE ENCOUNTER
Caller: Agnieszka Rizzo    Relationship: Self    Best call back number:[phone]     What orders are you requesting (i.e. lab or imaging): URINE CULTURE    In what timeframe would the patient need to come in: DAUGHTER CAN BRING IN TODAY 8-30-23        Additional notes: PATIENT SAW PULMONARY YESTERDAY, THEY PRESCRIBED ANTIBIOTIC AND STEROID, SHE WAS CONFUSED THEN AND IS MORE CONFUSED TODAY PER DAUGHTER.     DAUGHTER WOULD LIKE TO BRING IN URINE SAMPLE AS SHE IS CONCERNED ABOUT THE CONFUSION  WANTS TO RULE OUT URINARY TRACT INFECTION IF PROVIDER AGREEABLE.    THE  CEFDINIR 300MG  1 BID  ALSO ON PREDNISONE TAPER

## 2023-08-31 ENCOUNTER — TRANSCRIBE ORDERS (OUTPATIENT)
Dept: ADMINISTRATIVE | Facility: HOSPITAL | Age: 88
End: 2023-08-31
Payer: MEDICARE

## 2023-08-31 DIAGNOSIS — J47.9 BRONCHIECTASIS WITHOUT COMPLICATION: Primary | ICD-10-CM

## 2023-09-18 RX ORDER — LOSARTAN POTASSIUM 50 MG/1
TABLET ORAL
Qty: 90 TABLET | Refills: 3 | OUTPATIENT
Start: 2023-09-18

## 2023-09-20 ENCOUNTER — HOSPITAL ENCOUNTER (OUTPATIENT)
Dept: CT IMAGING | Facility: HOSPITAL | Age: 88
Discharge: HOME | End: 2023-09-20
Admitting: INTERNAL MEDICINE
Payer: MEDICARE

## 2023-09-20 DIAGNOSIS — J47.9 BRONCHIECTASIS WITHOUT COMPLICATION: ICD-10-CM

## 2023-09-20 PROCEDURE — 71250 CT THORAX DX C-: CPT

## 2023-10-01 ENCOUNTER — ANESTHESIA EVENT (OUTPATIENT)
Dept: GASTROENTEROLOGY | Facility: HOSPITAL | Age: 88
End: 2023-10-01
Payer: MEDICARE

## 2023-10-02 ENCOUNTER — ANESTHESIA (OUTPATIENT)
Dept: GASTROENTEROLOGY | Facility: HOSPITAL | Age: 88
End: 2023-10-02
Payer: MEDICARE

## 2023-10-02 ENCOUNTER — HOSPITAL ENCOUNTER (OUTPATIENT)
Facility: HOSPITAL | Age: 88
Setting detail: HOSPITAL OUTPATIENT SURGERY
Discharge: HOME | DRG: 291 | End: 2023-10-02
Attending: INTERNAL MEDICINE | Admitting: INTERNAL MEDICINE
Payer: MEDICARE

## 2023-10-02 VITALS
DIASTOLIC BLOOD PRESSURE: 70 MMHG | HEART RATE: 88 BPM | WEIGHT: 126 LBS | RESPIRATION RATE: 20 BRPM | HEIGHT: 63 IN | OXYGEN SATURATION: 93 % | SYSTOLIC BLOOD PRESSURE: 110 MMHG | BODY MASS INDEX: 22.32 KG/M2

## 2023-10-02 DIAGNOSIS — J47.9 BRONCHIECTASIS: ICD-10-CM

## 2023-10-02 LAB
APPEARANCE FLD: ABNORMAL
COLOR FLD: ABNORMAL
METHOD: ABNORMAL
NUC CELL # FLD: 4 /MM3
RBC # FLD AUTO: 37 /MM3

## 2023-10-02 PROCEDURE — 94761 N-INVAS EAR/PLS OXIMETRY MLT: CPT

## 2023-10-02 PROCEDURE — 87205 SMEAR GRAM STAIN: CPT | Performed by: INTERNAL MEDICINE

## 2023-10-02 PROCEDURE — 88305 TISSUE EXAM BY PATHOLOGIST: CPT | Performed by: INTERNAL MEDICINE

## 2023-10-02 PROCEDURE — 89050 BODY FLUID CELL COUNT: CPT | Performed by: INTERNAL MEDICINE

## 2023-10-02 PROCEDURE — 94664 DEMO&/EVAL PT USE INHALER: CPT

## 2023-10-02 PROCEDURE — 87206 SMEAR FLUORESCENT/ACID STAI: CPT | Performed by: INTERNAL MEDICINE

## 2023-10-02 PROCEDURE — 88112 CYTOPATH CELL ENHANCE TECH: CPT | Performed by: INTERNAL MEDICINE

## 2023-10-02 PROCEDURE — 87071 CULTURE AEROBIC QUANT OTHER: CPT | Performed by: INTERNAL MEDICINE

## 2023-10-02 PROCEDURE — 87102 FUNGUS ISOLATION CULTURE: CPT | Performed by: INTERNAL MEDICINE

## 2023-10-02 PROCEDURE — 0B9H8ZX DRAINAGE OF LUNG LINGULA, VIA NATURAL OR ARTIFICIAL OPENING ENDOSCOPIC, DIAGNOSTIC: ICD-10-PCS | Performed by: INTERNAL MEDICINE

## 2023-10-02 PROCEDURE — 25010000002 PHENYLEPHRINE 10 MG/ML SOLUTION: Performed by: ANESTHESIOLOGY

## 2023-10-02 PROCEDURE — 25010000002 PROPOFOL 10 MG/ML EMULSION: Performed by: ANESTHESIOLOGY

## 2023-10-02 PROCEDURE — 87116 MYCOBACTERIA CULTURE: CPT | Performed by: INTERNAL MEDICINE

## 2023-10-02 PROCEDURE — 94799 UNLISTED PULMONARY SVC/PX: CPT

## 2023-10-02 PROCEDURE — 94640 AIRWAY INHALATION TREATMENT: CPT

## 2023-10-02 PROCEDURE — 94760 N-INVAS EAR/PLS OXIMETRY 1: CPT

## 2023-10-02 RX ORDER — LIDOCAINE HYDROCHLORIDE 10 MG/ML
INJECTION, SOLUTION EPIDURAL; INFILTRATION; INTRACAUDAL; PERINEURAL AS NEEDED
Status: DISCONTINUED | OUTPATIENT
Start: 2023-10-02 | End: 2023-10-02 | Stop reason: HOSPADM

## 2023-10-02 RX ORDER — SODIUM CHLORIDE, SODIUM LACTATE, POTASSIUM CHLORIDE, CALCIUM CHLORIDE 600; 310; 30; 20 MG/100ML; MG/100ML; MG/100ML; MG/100ML
30 INJECTION, SOLUTION INTRAVENOUS CONTINUOUS PRN
Status: DISCONTINUED | OUTPATIENT
Start: 2023-10-02 | End: 2023-10-02 | Stop reason: HOSPADM

## 2023-10-02 RX ORDER — LIDOCAINE HYDROCHLORIDE 20 MG/ML
INJECTION, SOLUTION INFILTRATION; PERINEURAL AS NEEDED
Status: DISCONTINUED | OUTPATIENT
Start: 2023-10-02 | End: 2023-10-02 | Stop reason: SURG

## 2023-10-02 RX ORDER — SODIUM CHLORIDE 9 MG/ML
40 INJECTION, SOLUTION INTRAVENOUS AS NEEDED
Status: DISCONTINUED | OUTPATIENT
Start: 2023-10-02 | End: 2023-10-02 | Stop reason: HOSPADM

## 2023-10-02 RX ORDER — PROPOFOL 10 MG/ML
VIAL (ML) INTRAVENOUS AS NEEDED
Status: DISCONTINUED | OUTPATIENT
Start: 2023-10-02 | End: 2023-10-02 | Stop reason: SURG

## 2023-10-02 RX ORDER — PHENYLEPHRINE HYDROCHLORIDE 10 MG/ML
INJECTION INTRAVENOUS AS NEEDED
Status: DISCONTINUED | OUTPATIENT
Start: 2023-10-02 | End: 2023-10-02 | Stop reason: SURG

## 2023-10-02 RX ORDER — IPRATROPIUM BROMIDE AND ALBUTEROL SULFATE 2.5; .5 MG/3ML; MG/3ML
3 SOLUTION RESPIRATORY (INHALATION) ONCE
Status: COMPLETED | OUTPATIENT
Start: 2023-10-02 | End: 2023-10-02

## 2023-10-02 RX ORDER — SODIUM CHLORIDE 0.9 % (FLUSH) 0.9 %
10 SYRINGE (ML) INJECTION AS NEEDED
Status: DISCONTINUED | OUTPATIENT
Start: 2023-10-02 | End: 2023-10-02 | Stop reason: HOSPADM

## 2023-10-02 RX ORDER — LIDOCAINE HYDROCHLORIDE 20 MG/ML
INJECTION, SOLUTION EPIDURAL; INFILTRATION; INTRACAUDAL; PERINEURAL AS NEEDED
Status: DISCONTINUED | OUTPATIENT
Start: 2023-10-02 | End: 2023-10-02 | Stop reason: HOSPADM

## 2023-10-02 RX ADMIN — PHENYLEPHRINE HYDROCHLORIDE 100 MCG: 10 INJECTION INTRAVENOUS at 08:12

## 2023-10-02 RX ADMIN — LIDOCAINE HYDROCHLORIDE 30 MG: 20 INJECTION, SOLUTION INFILTRATION; PERINEURAL at 08:11

## 2023-10-02 RX ADMIN — PHENYLEPHRINE HYDROCHLORIDE 200 MCG: 10 INJECTION INTRAVENOUS at 08:38

## 2023-10-02 RX ADMIN — PROPOFOL 10 MG: 10 INJECTION, EMULSION INTRAVENOUS at 08:13

## 2023-10-02 RX ADMIN — IPRATROPIUM BROMIDE AND ALBUTEROL SULFATE 3 ML: 2.5; .5 SOLUTION RESPIRATORY (INHALATION) at 09:47

## 2023-10-02 RX ADMIN — PROPOFOL 20 MG: 10 INJECTION, EMULSION INTRAVENOUS at 08:15

## 2023-10-02 RX ADMIN — PHENYLEPHRINE HYDROCHLORIDE 200 MCG: 10 INJECTION INTRAVENOUS at 08:28

## 2023-10-02 RX ADMIN — PROPOFOL 50 MG: 10 INJECTION, EMULSION INTRAVENOUS at 08:12

## 2023-10-02 RX ADMIN — SODIUM CHLORIDE, POTASSIUM CHLORIDE, SODIUM LACTATE AND CALCIUM CHLORIDE: 600; 310; 30; 20 INJECTION, SOLUTION INTRAVENOUS at 08:07

## 2023-10-02 RX ADMIN — PROPOFOL 10 MG: 10 INJECTION, EMULSION INTRAVENOUS at 08:14

## 2023-10-02 RX ADMIN — PHENYLEPHRINE HYDROCHLORIDE 200 MCG: 10 INJECTION INTRAVENOUS at 08:32

## 2023-10-02 RX ADMIN — PROPOFOL 200 MCG/KG/MIN: 10 INJECTION, EMULSION INTRAVENOUS at 08:16

## 2023-10-02 NOTE — ANESTHESIA PREPROCEDURE EVALUATION
Anesthesia Evaluation     Patient summary reviewed and Nursing notes reviewed   history of anesthetic complications (MH in son):  malignant hyperthermia  NPO Solid Status: > 8 hours  NPO Liquid Status: > 8 hours           Airway   Mallampati: II  TM distance: >3 FB  Neck ROM: full  No difficulty expected  Dental - normal exam     Pulmonary    (+) pneumonia , COPD, asthma,home oxygen (2 lpm at night), shortness of breath  (-) sleep apnea, rhonchi, decreased breath sounds, wheezes, not a smoker  Cardiovascular   Exercise tolerance: poor (<4 METS)    PT is on anticoagulation therapy  Beta blocker given within 24 hours of surgery  Rhythm: regular  Rate: normal    (+) hypertension, valvular problems/murmurs MR, dysrhythmias Atrial Fib, CHF , hyperlipidemia  (-) CAD, angina, RAMOS, murmur    ROS comment: No eliquis since Friday   Had metoprolol yesterday    Neuro/Psych  (-) seizures, CVA  GI/Hepatic/Renal/Endo    (-) liver disease, no renal disease, diabetes, no thyroid disorder    Musculoskeletal     Abdominal     Abdomen: soft.   Substance History      OB/GYN          Other                      Anesthesia Plan    ASA 2     general     (LMA no succinylcholine or inhalational anesthetic    Ok with defib and medications in code situation)  intravenous induction     Anesthetic plan, risks, benefits, and alternatives have been provided, discussed and informed consent has been obtained with: patient and child.    CODE STATUS:

## 2023-10-02 NOTE — ANESTHESIA POSTPROCEDURE EVALUATION
"Patient: Agnieszka Rizzo    Procedure Summary       Date: 10/02/23 Room / Location:  [facility]    Anesthesia Start: 0807 Anesthesia Stop: 0844    Procedure: BRONCHOSCOPY WITH BRONCHIAL AVEOLAR LAVAGE (Bronchus) Diagnosis:     Surgeons: Rogelio Tucker MD Provider: Geovanny Aranda MD    Anesthesia Type: general ASA Status: 2            Anesthesia Type: general    Vitals  Vitals Value Taken Time   /73 10/02/23 0856   Temp     Pulse 88 10/02/23 0856   Resp 16 10/02/23 0856   SpO2 93 % 10/02/23 0856           Post Anesthesia Care and Evaluation    Level of consciousness: awake and alert  Pain management: adequate    Airway patency: patent  Anesthetic complications: No anesthetic complications  PONV Status: none  Cardiovascular status: acceptable  Respiratory status: acceptable  Hydration status: acceptable    Comments: /73   Pulse 88   Resp 16   Ht 160 cm (63\")   Wt 57.2 kg (126 lb)   SpO2 93%   BMI 22.32 kg/m²       "

## 2023-10-02 NOTE — H&P
Agnieszka ROBISON Matthias  Appointment: 2023 12:45 PM  Location: [facility]  Patient #: [id number]  : 1930   / Language: English / Race: White  Female      History of Present Illness (Rogelio Tucker MD; 10/2/2023 7:13 AM)  The patient is a 89 year old female who presents with bronchiectasis.  Cough is worse. Had CT and here to review. On O2 at night. Doing nebs and VEST bid. Brings up moderate amt of phlegm. No fever or chills. She is quite troubled by her cough and phlegm. She had bronch approx 1 year ago. Using azithromycin daily. Remains on Advair bid.      Problem List/Past Medical (Ledy James; 2023 12:47 PM)  Allergic rhinitis (J30.9)    Diastolic CHF, chronic (I50.32)    Aspergillosis (B44.9)    Pneumothorax (J93.9)    Atrial fibrillation (I48.91)    Hyponatremia (E87.1)    Bigeminy    Colitis    Hyperlipidemia (E78.5)    Problems Reconciled      Past Surgical History (Ledy James; 2023 12:47 PM)  Hysterectomy      Allergies (Ledy James; 2023 12:54 PM)  Ramipril *ANTIHYPERTENSIVES*    Levaquin *FLUOROQUINOLONES*    Erythromycin *MACROLIDES*    Bactrim *ANTI-INFECTIVE AGENTS - MISC.*    AmLODIPine Bes+SyrSpend SF *CALCIUM CHANNEL BLOCKERS*    Mold Spores    Dust    Aspirin *ANALGESICS - NonNarcotic*   [2018]:  Aspirin 81 MG Oral Tablet Chewable    Allergies Reconciled   [2023]:    Medication History (Ledy James; 2023 12:58 PM)  Metoprolol Tartrate  (100mg tablet, 1 in am 0.5 in pm oral two times daily) Active.  cyanocobalamin (vitamin B-12)  (oral) Specific strength unknown - Active.  Ventolin HFA  (108 (90 Base)MCG/ACT HFA Aerosol wit, 2 (two) Inhalation every 4 hours as needed, Taken starting 2021) Active. (Ok to change to any Albuterol inhaler covered)  Fluticasone Propionate  (50MCG/ACT spray, suspensi, 2 Nasal at bedtime, Taken starting 2019) Active.  Advair HFA  (115-21mcg/actuat HFA Aerosol wit, 2 (two) inhalation two times  daily, Taken starting 02/02/2023) Active.  Benzonatate  (200MG capsule, 1 (one) Oral two times daily as needed, Taken starting 01/17/2022) Active.  sodium chloride  (7% vial for nebuli, 4 inhalation twice a day via nebulizer, Taken starting 12/27/2022) Active. (Dx: J47.9)  ipratropium-albuteroL  (0.5 mg-3 mg(2.5mg base)/3 Ampul For Nebul, 1 (one) inhalation every four hours prn, Taken starting 12/27/2022) Active. (Dx: J47.9; Please bill Medicare Part B)  azithromycin  (250mg tablet, 1 (one) oral daily, Taken starting 08/29/2023) Active.  Acetylcysteine  (10% vial, 4 Inhalation tid as needed, Taken starting 05/26/2022) Active.  Vitamin D  (1000UNIT capsule, 1 Oral daily) Active.  Torsemide  (20MG tablet, 1 Oral two times daily) Active.  Ferrous Sulfate Natural  (325MG Tablet, 1 Oral daily) Active.  Potassium Chloride  (10MEQ capsule, extend, 1 Oral daily) Active.  ZyrTEC Allergy  (10MG capsule, 1 Oral daily) Active.  Ocuvite Eye + Multi  (1 Oral daily) Active.  PreserVision AREDS  (1 Oral daily) Active.  Eliquis  (2.5MG tablet, 1 Oral two times daily) Active.  CeleXA  (10MG tablet, 1 Oral daily) Active.  Medications Reconciled     Social History (Ledy James; 9/28/2023 12:48 PM)  Exercise   6 x week. [employer] 05/09/2023  No drug use   [08/23/2023]: Patient entered 10/17/2016  Alcohol use   [08/23/2023]: Occasional alcohol use. [employer] 05/09/2023  Tobacco Use   [08/23/2023]: Never smoker. Corinne Godbey LMR,[facility] 08/29/2023  Marital status   . 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  Current work status   Retired.  Caffeine use   Coffee.  Highest Education Level Attained   Some college. Patient entered 10/17/2016    Family History (Ledy James; 9/28/2023 12:48 PM)  Coronary Artery Disease   .  Atrial Fibrillation   Mother. .  Heart Disease   Father, Brother, Sister. Patient entered 10/17/2016  Congestive Heart Failure   Father, Brother. Patient  entered 10/17/2016  Cerebrovascular Accident   Mother. .  Asthma   Mother. Patient entered 10/17/2016  Hypertension   Mother. [employer] 02/05/2020  Cancer   [facility]. [facility]  Heart Disease   Father, Brother. [facility] 02/05/2020    Diagnostic Studies History (Ledy James; 9/28/2023 12:48 PM)  OPO on 02 2lpm   ([facility] 1/6/2020) 24 sec <88%  Overnight pulse ox on RA   ([facility] 7/19/2018) 17 minutes 16 seconds <88%  Bronchoscopy   11/2016 fungus  CT Chest WO Contrast   ([facility] 5/30/2017) IMPRESSION:  There is progression of underlying reticulonodular  infiltrates, peribronchial thickening, as well as progressive volume  loss and scar at the right middle lobe with internal bronchiectasis.  There are additional alveolar infiltrates that are new at the right  upper lobe, left upper lobe and lingula. These may represent  superimposed bacterial pneumonia.  Exercise oximetry   5/4/17 on  no desaturation  12/7/16 RA sats 97, no desaturation. Total distance ambulation 850 ft.  CXR   12/1/16 at [facility] no PTX reported, right infrahilar region mild chronic changes  2/12/16 in [place] no acute disease.  Overnight oximetry   5/16/17 overnight on , cumulative <88% 10min and 40 seconds; continuous 3 min and 48 sec  CTA Chest   ([facility] 1/1/2017) FINDINGS:  1. There is no PE.  2. Diffuse vascular calcifications including aortic and coronary  arteries.  3. Cardiomegaly, mild pulmonary artery hypertension.  4. Acute right middle lobe and right upper lobe and minimal right lower  lobe pneumonia.  5. Atelectasis versus scarring in the lingula and left base.  6. Recommend delayed follow up CT to confirm complete resolution.  7. Left renal cyst benign hepatic cysts.  IMPRESSION:  1. Patchy right-sided pneumonia.  HST   10/26/15 no MAILE but significant nocturnal hypoxemia seen.  CT chest   10/2016, RUL infiltrate present, RLL infiltrate resolved  1/2016 new RUL infiltrate and bilateral reticulonodular infiltrates.  10/2014 Chronic tiny  reticular opacities in both lung fields, greatest in the right upper lobe, appear unchanged.  7/12/10 complete resolution of acute bilateral infiltrates; RUL and RML bronchiectasis-chronic.  PFTs   10/17/16 FEV1- 75% FEV1/FVC- 89%  10/26/15 FEV1- 69% FEV1/FVC- 80%  6/10/13- FEV1-67%, FVC-66%. FEV1/FVC-75%. RV/%; DLCO 42%.    Health Maintenance History (Ledy James; 9/28/2023 1:00 PM)  Pneumovax   [2015]: 2000 & Prevnar 2015  Flu Vaccine   [09/28/2023]: Performed. Has not had flu vaccine since 8/1/2023.  Intramuscular administration of second dose of Pfizer-BioNTech COVID-19 vaccine (46300)   [05/11/2023]:  Covid   [09/28/2023]: Covid questions asked and had it in December of 2021  DME Company   [08/23/2023]: [employer] 02 2lpm @ night    Travel History (Ledy James; 9/28/2023 12:48 PM)  None   [08/23/2023]: No travel 08/29/2023    Vitals (Ledy James; 9/28/2023 1:03 PM)  9/28/2023 1:02 PM  Weight: 130 lb   Height: 62 in  Neck: 14 in     Body Surface Area: 1.59 m²   Body Mass Index: 23.78 kg/m²    Temp.: 98.8° F  (Thermal Scan)    Pulse: 79 (Regular)    P.OX: 97% (Room air)  BP: 110/68(Sitting, Left Arm, Standard)              Physical Exam (Rogelio Tucker MD; 9/28/2023 1:33 PM)  General  General Appearance - Not in acute distress.  Build & Nutrition - Well developed.  Gait - Ambulatory.  Oxygen Therapy - Breathing Room Air.    Head and Neck  Trachea - midline.  Thyroid  Gland Characteristics - normal size and consistency.    ENMT  Global Assessment  Upon examination of the ears, nose, mouth and throat - external inspection reveals no scars, masses or lesions.  Mouth and Throat  Oral Cavity/Oropharynx - Teeth - Inspection reveals normal dentation. Tongue - normal and enlarged. Oropharynx - Mallampati II, no thrush present. Tonsils - Characteristics - Bilateral - no hypertrophy.    Chest and Lung Exam  Inspection  Chest Wall - Normal. Shape - Normal and Symmetric. Movements - Normal and Symmetrical.  Accessory muscles - No use of accessory muscles in breathing.  Percussion  Quality and Intensity - Percussion of the chest reveals - Normal resonance. Diaphragm - Percussion Normal.  Palpation  Palpation of the chest reveals - Non-tender.  Auscultation  Adventitious sounds - Rales - Both Lung Fields  (SCATTERED DIFFUSE - NOTED MOST IN UPPER LUNG ARIAS ANTERIORLY) .    Cardiovascular  Auscultation  Rhythm - Regular. Rate - heart rate is normal. Murmurs & Other Heart Sounds - Auscultation of the heart reveals - No Murmurs. Heart Sounds - Normal heart sounds.    Abdomen  Palpation/Percussion  Palpation and Percussion of the abdomen reveal - soft and non-tender. Liver - Normal. Spleen - Normal.  Auscultation  - Bowel sounds normal.    Peripheral Vascular  Upper Extremity  Inspection - Bilateral - Pink nail beds - Bilateral, No Digital clubbing - Bilateral.  Lower Extremity  Palpation - Edema - Bilateral - No edema - Bilateral.    Neuropsychiatric  Orientation - oriented X3.  The patient's mood and affect are described as  - normal.  Judgment and Insight - insight is appropriate concerning matters relevant to self.    Lymphatic  General  Cervical Nodes - Grossly normal bilaterally. Supraclavicular Nodes - Grossly normal bilaterally.      Results (Rogelio Tucker MD; 10/2/2023 7:23 AM)  Procedures    Name  Value  Date  CT CHEST HI RESOLUTION DIAGNOSTIC  Procedure Note: See Note        (Include inspiration/expiration?-&#62;Yes  Include prone and supine images?-&#62;Yes  Release to patient-&#62;Routine Release  EXAM: CT CHEST HI RESOLUTION DIAGNOSTIC-    HISTORY: Bronchiectasis.    TECHNIQUE: Radiation dose reduction techniques were utilized, including  automated exposure control and exposure modulation based on body size. 1  mm images were obtained through the chest without the administration of  IV contrast. Expiratory imaging was obtained.    COMPARISON: CT chest 11/30/2018.      FINDINGS: Aspirated secretions within the  distal left trachea and within  a right lower lobe segmental bronchus (series 3 images 27 and 57).  Segmental right middle lobe severe bronchiectasis with associated  segmental collapse. New mild cylindrical bronchiectasis throughout the  remaining right lung. Diffuse bilateral tree-in-bud nodularity, most  pronounced in the bilateral upper lobes. Previous findings have  progressed since 2018 CT. Few bilateral nodular consolidations measuring  up to 1.2 cm at the left lung base (series 7 image 253). No reticulation  or honeycombing. Mild bilateral air trapping.    Global enlargement of the heart, increased from 2018. Small volume  pericardial fluid. Moderate coronary artery calcifications. Nondilated  main pulmonary artery and thoracic aorta. No mediastinal/hilar  lymphadenopathy. T11 and T12 compression fractures, mildly progressed  since 2018.        1. Progressed severe segmental right middle lobe bronchiectasis with  associated right middle lobe segmental collapse. New mild cylindrical  bronchiectasis throughout the remaining right lung. Diffuse bilateral  bronchiolitis/small airways disease, worsened since 2018. 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  2. Aspirated secretions within the trachea and right lower lobe  segmental bronchus with small bibasilar nodular consolidations,  suggesting focal pneumonitis/pneumonia. Consider follow-up chest CT in 8  to 12 weeks to ensure clearing.  3. Global enlargement of the heart, worsened since 2018. Moderate  coronary artery calcifications.  4. T11 and T12 compression fractures, mildly progressed since 2018.      This report was finalized on 9/26/2023 3:47 PM by Dr. Karel Castañeda M.D.    Signed by: Karel Castañeda MD on 9/26/2023 3:47 PM    )    Performed: 9/26/2023 3:47 PM        Assessment & Plan (Rogelio Tucker MD; 10/2/2023 7:23 AM)    Bronchiectasis (J47.9)  Impression: Despite VEST and nebs BID she is having excessive cough  and phlegm. Remains on Azithromycin daily. CT reviewed and discussed with patient. She is quite miserable with all the cough. Offered repeat bronchoscopy and she agrees. Discussed with dtr also. With ongoing treatment with Azithromycin - unlikely to find MAC even if present. However at her advanced age I'm not sure she would tolerate full dose treatment for MAC. Daughter understands and would like to stay on Azithromycin and proceed with bronch.  I have discussed bronchoscopy with bronchoalveolar lavage and transbronchial biopsy with the patient/caregiver. Alternatives, potential benefits and risks were discussed. These include (but not limited to) respiratory failure requiring supplemental oxygen to intubation and mechanical ventilation; bleeding risk requiring surgical intervention (rarely); pneumothorax risk requiring possible placement of chest tube and therefore inpatient stay following procedure.  Patient/caregiver was asked to stop all anti-platelet and anticoagulant medications. They were asked to remain nil per orally from midnight of date of test and to bring a  to drive them back home after procedure.    Current Plans  Screened for tobacco use (1000F)  Pt Education - How to Access Health Information Online using Patient Portal and 3rd Party Apps: discussed with patient and provided information.    Moderate persistent asthma (J45.40)  Impression: Stable to Advair bid.      Pulmonary infiltrate (R91.8)  Impression: HRCT reviewed with patient and daughter. See above re: bronch.  I have personally reviewed this patient's chest imaging on a CD or online PACS system. The radiologist written report was compared to the visual images by me.      Hypoxia (R09.02)  Impression: Using O2 at night and compliant. Benefits.      PLANS    Current Plans  Follow up in 1 week or as needed  Follow-up with PJ Herbert as indicated below

## 2023-10-02 NOTE — DISCHARGE INSTRUCTIONS
For the next 24 hours patient needs to be with a responsible adult.    For 24 hours DO NOT drive, operate machinery, appliances, drink alcohol, make important decisions or sign legal documents.    Start with a light or bland diet if you are feeling sick to your stomach otherwise advance to regular diet as tolerated.    Follow recommendations on procedure report if provided by your doctor.    Call Dr Tucker for problems [phone]    Nothing to eat or drink until 9:30 am.    You may restart Eliquis today.    Problems may include but not limited to: large amounts of bleeding, trouble breathing, repeated vomiting, severe unrelieved pain, fever or chills.

## 2023-10-02 NOTE — ANESTHESIA PROCEDURE NOTES
Airway  Urgency: elective    Date/Time: 10/2/2023 8:15 AM  Airway not difficult    General Information and Staff    Patient location during procedure: OR  Anesthesiologist: Geovanny Aranda MD    Indications and Patient Condition  Indications for airway management: airway protection    Preoxygenated: yes  Mask difficulty assessment: 1 - vent by mask    Final Airway Details  Final airway type: supraglottic airway      Successful airway: LMA  Size 3     Number of attempts at approach: 1  Assessment: lips, teeth, and gum same as pre-op

## 2023-10-03 LAB
CYTO UR: NORMAL
LAB AP CASE REPORT: NORMAL
PATH REPORT.FINAL DX SPEC: NORMAL
PATH REPORT.GROSS SPEC: NORMAL

## 2023-10-04 ENCOUNTER — APPOINTMENT (OUTPATIENT)
Dept: GENERAL RADIOLOGY | Facility: HOSPITAL | Age: 88
DRG: 291 | End: 2023-10-04
Payer: MEDICARE

## 2023-10-04 ENCOUNTER — HOSPITAL ENCOUNTER (INPATIENT)
Facility: HOSPITAL | Age: 88
LOS: 5 days | Discharge: HOME | DRG: 291 | End: 2023-10-10
Attending: STUDENT IN AN ORGANIZED HEALTH CARE EDUCATION/TRAINING PROGRAM | Admitting: HOSPITALIST
Payer: MEDICARE

## 2023-10-04 DIAGNOSIS — I50.33 ACUTE ON CHRONIC DIASTOLIC CONGESTIVE HEART FAILURE: Primary | ICD-10-CM

## 2023-10-04 PROBLEM — I50.9 ACUTE EXACERBATION OF CHF (CONGESTIVE HEART FAILURE): Status: ACTIVE | Noted: 2023-10-04

## 2023-10-04 LAB
ALBUMIN SERPL-MCNC: 3.7 G/DL (ref 3.5–5.2)
ALBUMIN/GLOB SERPL: 1.2 G/DL
ALP SERPL-CCNC: 114 U/L (ref 39–117)
ALT SERPL W P-5'-P-CCNC: 13 U/L (ref 1–33)
ANION GAP SERPL CALCULATED.3IONS-SCNC: 14 MMOL/L (ref 5–15)
AST SERPL-CCNC: 21 U/L (ref 1–32)
BACTERIA SPEC AEROBE CULT: NORMAL
BACTERIA UR QL AUTO: NORMAL /HPF
BASOPHILS # BLD AUTO: 0.04 10*3/MM3 (ref 0–0.2)
BASOPHILS NFR BLD AUTO: 0.3 % (ref 0–1.5)
BILIRUB SERPL-MCNC: 0.4 MG/DL (ref 0–1.2)
BILIRUB UR QL STRIP: NEGATIVE
BUN SERPL-MCNC: 31 MG/DL (ref 8–23)
BUN/CREAT SERPL: 27.2 (ref 7–25)
CALCIUM SPEC-SCNC: 9.3 MG/DL (ref 8.2–9.6)
CHLORIDE SERPL-SCNC: 99 MMOL/L (ref 98–107)
CLARITY UR: CLEAR
CO2 SERPL-SCNC: 27 MMOL/L (ref 22–29)
COLOR UR: YELLOW
CREAT SERPL-MCNC: 1.14 MG/DL (ref 0.57–1)
DEPRECATED RDW RBC AUTO: 47.8 FL (ref 37–54)
EGFRCR SERPLBLD CKD-EPI 2021: 45 ML/MIN/1.73
EOSINOPHIL # BLD AUTO: 0.11 10*3/MM3 (ref 0–0.4)
EOSINOPHIL NFR BLD AUTO: 0.8 % (ref 0.3–6.2)
ERYTHROCYTE [DISTWIDTH] IN BLOOD BY AUTOMATED COUNT: 13.8 % (ref 12.3–15.4)
GLOBULIN UR ELPH-MCNC: 3 GM/DL
GLUCOSE SERPL-MCNC: 135 MG/DL (ref 65–99)
GLUCOSE UR STRIP-MCNC: NEGATIVE MG/DL
GRAM STN SPEC: NORMAL
HCT VFR BLD AUTO: 29 % (ref 34–46.6)
HGB BLD-MCNC: 9.6 G/DL (ref 12–15.9)
HGB UR QL STRIP.AUTO: ABNORMAL
HOLD SPECIMEN: NORMAL
HOLD SPECIMEN: NORMAL
HYALINE CASTS UR QL AUTO: NORMAL /LPF
IMM GRANULOCYTES # BLD AUTO: 0.1 10*3/MM3 (ref 0–0.05)
IMM GRANULOCYTES NFR BLD AUTO: 0.7 % (ref 0–0.5)
KETONES UR QL STRIP: NEGATIVE
LEUKOCYTE ESTERASE UR QL STRIP.AUTO: NEGATIVE
LYMPHOCYTES # BLD AUTO: 1.47 10*3/MM3 (ref 0.7–3.1)
LYMPHOCYTES NFR BLD AUTO: 10.6 % (ref 19.6–45.3)
MCH RBC QN AUTO: 32.1 PG (ref 26.6–33)
MCHC RBC AUTO-ENTMCNC: 33.1 G/DL (ref 31.5–35.7)
MCV RBC AUTO: 97 FL (ref 79–97)
MONOCYTES # BLD AUTO: 1.4 10*3/MM3 (ref 0.1–0.9)
MONOCYTES NFR BLD AUTO: 10.1 % (ref 5–12)
NEUTROPHILS NFR BLD AUTO: 10.79 10*3/MM3 (ref 1.7–7)
NEUTROPHILS NFR BLD AUTO: 77.5 % (ref 42.7–76)
NITRITE UR QL STRIP: NEGATIVE
NRBC BLD AUTO-RTO: 0.1 /100 WBC (ref 0–0.2)
NT-PROBNP SERPL-MCNC: 8193 PG/ML (ref 0–1800)
PH UR STRIP.AUTO: 6.5 [PH] (ref 5–8)
PLATELET # BLD AUTO: 278 10*3/MM3 (ref 140–450)
PMV BLD AUTO: 10.4 FL (ref 6–12)
POTASSIUM SERPL-SCNC: 3.4 MMOL/L (ref 3.5–5.2)
PROT SERPL-MCNC: 6.7 G/DL (ref 6–8.5)
PROT UR QL STRIP: NEGATIVE
RBC # BLD AUTO: 2.99 10*6/MM3 (ref 3.77–5.28)
RBC # UR STRIP: NORMAL /HPF
REF LAB TEST METHOD: NORMAL
SODIUM SERPL-SCNC: 140 MMOL/L (ref 136–145)
SP GR UR STRIP: 1.01 (ref 1–1.03)
SQUAMOUS #/AREA URNS HPF: NORMAL /HPF
TROPONIN T SERPL HS-MCNC: 31 NG/L
UROBILINOGEN UR QL STRIP: ABNORMAL
WBC # UR STRIP: NORMAL /HPF
WBC NRBC COR # BLD: 13.91 10*3/MM3 (ref 3.4–10.8)
WHOLE BLOOD HOLD COAG: NORMAL
WHOLE BLOOD HOLD SPECIMEN: NORMAL

## 2023-10-04 PROCEDURE — 93005 ELECTROCARDIOGRAM TRACING: CPT | Performed by: STUDENT IN AN ORGANIZED HEALTH CARE EDUCATION/TRAINING PROGRAM

## 2023-10-04 PROCEDURE — 83880 ASSAY OF NATRIURETIC PEPTIDE: CPT | Performed by: STUDENT IN AN ORGANIZED HEALTH CARE EDUCATION/TRAINING PROGRAM

## 2023-10-04 PROCEDURE — 93010 ELECTROCARDIOGRAM REPORT: CPT | Performed by: INTERNAL MEDICINE

## 2023-10-04 PROCEDURE — 94761 N-INVAS EAR/PLS OXIMETRY MLT: CPT

## 2023-10-04 PROCEDURE — 94664 DEMO&/EVAL PT USE INHALER: CPT

## 2023-10-04 PROCEDURE — 84484 ASSAY OF TROPONIN QUANT: CPT | Performed by: STUDENT IN AN ORGANIZED HEALTH CARE EDUCATION/TRAINING PROGRAM

## 2023-10-04 PROCEDURE — 85025 COMPLETE CBC W/AUTO DIFF WBC: CPT | Performed by: STUDENT IN AN ORGANIZED HEALTH CARE EDUCATION/TRAINING PROGRAM

## 2023-10-04 PROCEDURE — 80053 COMPREHEN METABOLIC PANEL: CPT | Performed by: STUDENT IN AN ORGANIZED HEALTH CARE EDUCATION/TRAINING PROGRAM

## 2023-10-04 PROCEDURE — 25010000002 FUROSEMIDE PER 20 MG: Performed by: STUDENT IN AN ORGANIZED HEALTH CARE EDUCATION/TRAINING PROGRAM

## 2023-10-04 PROCEDURE — G0378 HOSPITAL OBSERVATION PER HR: HCPCS

## 2023-10-04 PROCEDURE — 0202U NFCT DS 22 TRGT SARS-COV-2: CPT | Performed by: NURSE PRACTITIONER

## 2023-10-04 PROCEDURE — 94799 UNLISTED PULMONARY SVC/PX: CPT

## 2023-10-04 PROCEDURE — 94760 N-INVAS EAR/PLS OXIMETRY 1: CPT

## 2023-10-04 PROCEDURE — 71045 X-RAY EXAM CHEST 1 VIEW: CPT

## 2023-10-04 PROCEDURE — 99285 EMERGENCY DEPT VISIT HI MDM: CPT

## 2023-10-04 PROCEDURE — 81001 URINALYSIS AUTO W/SCOPE: CPT | Performed by: STUDENT IN AN ORGANIZED HEALTH CARE EDUCATION/TRAINING PROGRAM

## 2023-10-04 PROCEDURE — 94640 AIRWAY INHALATION TREATMENT: CPT

## 2023-10-04 RX ORDER — SODIUM CHLORIDE 9 MG/ML
40 INJECTION, SOLUTION INTRAVENOUS AS NEEDED
Status: DISCONTINUED | OUTPATIENT
Start: 2023-10-04 | End: 2023-10-05

## 2023-10-04 RX ORDER — SODIUM CHLORIDE 0.9 % (FLUSH) 0.9 %
10 SYRINGE (ML) INJECTION EVERY 12 HOURS SCHEDULED
Status: DISCONTINUED | OUTPATIENT
Start: 2023-10-04 | End: 2023-10-05

## 2023-10-04 RX ORDER — POTASSIUM CHLORIDE 750 MG/1
20 TABLET, FILM COATED, EXTENDED RELEASE ORAL ONCE
Status: COMPLETED | OUTPATIENT
Start: 2023-10-04 | End: 2023-10-04

## 2023-10-04 RX ORDER — ACETAMINOPHEN 650 MG/1
650 SUPPOSITORY RECTAL EVERY 4 HOURS PRN
Status: DISCONTINUED | OUTPATIENT
Start: 2023-10-04 | End: 2023-10-05

## 2023-10-04 RX ORDER — BISACODYL 5 MG/1
5 TABLET, DELAYED RELEASE ORAL DAILY PRN
Status: DISCONTINUED | OUTPATIENT
Start: 2023-10-04 | End: 2023-10-10 | Stop reason: HOSPADM

## 2023-10-04 RX ORDER — ACETAMINOPHEN 325 MG/1
650 TABLET ORAL EVERY 4 HOURS PRN
Status: DISCONTINUED | OUTPATIENT
Start: 2023-10-04 | End: 2023-10-10 | Stop reason: HOSPADM

## 2023-10-04 RX ORDER — SODIUM CHLORIDE 0.9 % (FLUSH) 0.9 %
10 SYRINGE (ML) INJECTION AS NEEDED
Status: DISCONTINUED | OUTPATIENT
Start: 2023-10-04 | End: 2023-10-05

## 2023-10-04 RX ORDER — SODIUM CHLORIDE 0.9 % (FLUSH) 0.9 %
10 SYRINGE (ML) INJECTION AS NEEDED
Status: DISCONTINUED | OUTPATIENT
Start: 2023-10-04 | End: 2023-10-10 | Stop reason: HOSPADM

## 2023-10-04 RX ORDER — BISACODYL 10 MG
10 SUPPOSITORY, RECTAL RECTAL DAILY PRN
Status: DISCONTINUED | OUTPATIENT
Start: 2023-10-04 | End: 2023-10-10 | Stop reason: HOSPADM

## 2023-10-04 RX ORDER — ALBUTEROL SULFATE 2.5 MG/3ML
2.5 SOLUTION RESPIRATORY (INHALATION)
Status: DISCONTINUED | OUTPATIENT
Start: 2023-10-04 | End: 2023-10-04

## 2023-10-04 RX ORDER — POLYETHYLENE GLYCOL 3350 17 G/17G
17 POWDER, FOR SOLUTION ORAL DAILY PRN
Status: DISCONTINUED | OUTPATIENT
Start: 2023-10-04 | End: 2023-10-10 | Stop reason: HOSPADM

## 2023-10-04 RX ORDER — FUROSEMIDE 10 MG/ML
40 INJECTION INTRAMUSCULAR; INTRAVENOUS ONCE
Status: COMPLETED | OUTPATIENT
Start: 2023-10-04 | End: 2023-10-04

## 2023-10-04 RX ORDER — AMOXICILLIN 250 MG
2 CAPSULE ORAL 2 TIMES DAILY PRN
Status: DISCONTINUED | OUTPATIENT
Start: 2023-10-04 | End: 2023-10-10 | Stop reason: HOSPADM

## 2023-10-04 RX ORDER — ACETAMINOPHEN 160 MG/5ML
650 SOLUTION ORAL EVERY 4 HOURS PRN
Status: DISCONTINUED | OUTPATIENT
Start: 2023-10-04 | End: 2023-10-05

## 2023-10-04 RX ORDER — NITROGLYCERIN 0.4 MG/1
0.4 TABLET SUBLINGUAL
Status: DISCONTINUED | OUTPATIENT
Start: 2023-10-04 | End: 2023-10-10 | Stop reason: HOSPADM

## 2023-10-04 RX ADMIN — ALBUTEROL SULFATE 2.5 MG: 2.5 SOLUTION RESPIRATORY (INHALATION) at 21:11

## 2023-10-04 RX ADMIN — FUROSEMIDE 40 MG: 10 INJECTION, SOLUTION INTRAMUSCULAR; INTRAVENOUS at 20:52

## 2023-10-04 RX ADMIN — ALBUTEROL SULFATE 2.5 MG: 2.5 SOLUTION RESPIRATORY (INHALATION) at 21:18

## 2023-10-04 RX ADMIN — POTASSIUM CHLORIDE 20 MEQ: 750 TABLET, EXTENDED RELEASE ORAL at 23:20

## 2023-10-04 NOTE — ED NOTES
Per EMS:    Patient had a bronchoscopy Monday and was sent home on night time O2. Patient has had increased SOA and stayed on her oxygen all day. Pt has afib with 's-160's.

## 2023-10-04 NOTE — ED PROVIDER NOTES
EMERGENCY DEPARTMENT ENCOUNTER    Room Number:  26/26  PCP: Matt Rose MD  Historian: Patient, EMS      HPI:  Chief Complaint: Shortness of breath, hypoxia  Context: Agnieszka Rizzo is a 89 y.o. female who presents to the ED c/o shortness of breath, hypoxia.  Patient was at the hospital earlier this week for bronchoscopy due to chronic cough.  Patient states she is not sure what the findings.  Was told it was noninfectious.  Patient does have a history of diastolic heart failure.  Patient states he has had increasing shortness of breath.  Patient does wear 2 L O2 at nighttime but does not typically wear any during the day.  Patient is requiring 3 L O2 supplementation to maintain appropriate saturation.  Patient denies chest pain, nausea, vomiting.            PAST MEDICAL HISTORY  Active Ambulatory Problems     Diagnosis Date Noted    Primary hypertension 12/01/2016    Chronic coronary artery disease 12/01/2016    Familial hypercholesterolemia 12/01/2016    Mitral valve insufficiency 12/01/2016    Ventricular premature beats 12/01/2016    Ventricular tachycardia 12/01/2016    Acute on chronic respiratory failure with hypoxia 01/09/2017    Acid-fast bacteria present 01/09/2017    DNR (do not resuscitate) 03/12/2017    Chronic diastolic CHF (congestive heart failure) 03/12/2017    Asymptomatic bacteriuria 04/03/2017    Iron deficiency anemia 04/04/2017    Hypokalemia 04/04/2017    Bronchiectasis 04/17/2017    Pneumonia of both lungs due to infectious organism 05/31/2017    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    Paroxysmal atrial fibrillation 06/09/2017    Anxiety 06/20/2017    Exposure to hepatitis A 04/20/2018    Medicare annual wellness visit, subsequent 04/18/2019    Abdominal pain 09/23/2019    Herpes infection 11/19/2019    Moderate asthma with acute exacerbation 12/02/2019    Bronchitis, acute, with bronchospasm 12/16/2019    Abnormal EKG 12/16/2019    Fall     Laceration of left upper  extremity 07/16/2021    Multiple skin tears 07/16/2021    Alteration in anticoagulation     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    Clinical diagnosis of COVID-19 01/04/2022    Pneumonia of right lung due to infectious organism, unspecified part of lung 07/09/2022    COPD (chronic obstructive pulmonary disease)     Dizziness 09/09/2022    Bronchiectasis 02/20/2023    Sepsis due to pneumonia 04/03/2023    Acute pulmonary edema 08/04/2023    Electrolyte imbalance 08/16/2023     Resolved Ambulatory Problems     Diagnosis Date Noted    Pneumothorax of right lung after biopsy 11/12/2016    Pulmonary aspergillosis 11/16/2016    Heart failure, diastolic, with acute decompensation 12/01/2016    Persistent atrial fibrillation 12/01/2016    Pneumonia 01/01/2017    Pneumonia with the fungal infection aspergillosis 01/06/2017    Hyponatremia 02/08/2017    C. difficile colitis 03/11/2017    Sepsis 03/12/2017    CHF (congestive heart failure) 03/22/2017    Pancolitis 04/02/2017    Acute bronchitis due to parainfluenza virus 12/16/2019    COPD with acute exacerbation  12/16/2019    UTI (urinary tract infection) 12/19/2019     Past Medical History:   Diagnosis Date    Aspergillus     Asthma     Atrial fibrillation     Atrial flutter     C. difficile diarrhea 03/11/2017    CAD (coronary artery disease)     Colitis     Cough     Cryoglobulinemia     Dyspnea on exertion     Hyperlipidemia     Hypertension     Hypoxia     Infectious viral hepatitis     Left shoulder pain     Leg swelling     Lesion of lung     Malignant hyperthermia due to anesthesia     Mild tricuspid regurgitation     MR (mitral regurgitation)     MVP (mitral valve prolapse)     Permanent atrial fibrillation     Pneumothorax     SOB (shortness of breath)     Wheeze          PAST SURGICAL HISTORY  Past Surgical History:   Procedure Laterality Date    BRONCHOSCOPY N/A 11/12/2016    Procedure: BRONCHOSCOPY WITH FLUORO, BRUSHINGS, BAL, AND BIOPSIES;  Surgeon: Rogelio Tucker MD;   Location: [facility] ENDOSCOPY;  Service:     BRONCHOSCOPY Bilateral 06/03/2017    Procedure: BRONCHOSCOPY with BAL ;  Surgeon: Sung King MD;  Location: [facility] ENDOSCOPY;  Service:     BRONCHOSCOPY N/A 12/17/2019    Procedure: BRONCHOSCOPY WITH WASHINGS;  Surgeon: Rogelio Tucker MD;  Location: [facility] ENDOSCOPY;  Service: Pulmonary    BRONCHOSCOPY N/A 07/15/2022    Procedure: BRONCHOSCOPY;  Surgeon: Rogelio Tucker MD;  Location: [facility] ENDOSCOPY;  Service: Pulmonary;  Laterality: N/A;  Pre: Pneumonia  Post: Pneumonia    BRONCHOSCOPY N/A 10/2/2023    Procedure: BRONCHOSCOPY WITH BRONCHIAL AVEOLAR LAVAGE;  Surgeon: Rogelio Tucker MD;  Location: [facility] ENDOSCOPY;  Service: Pulmonary;  Laterality: N/A;  PRE:BRONCHIECTASIS /   POST: SAME    CATARACT EXTRACTION EXTRACAPSULAR W/ INTRAOCULAR LENS IMPLANTATION      COLONOSCOPY      2013    D & C WITH SUCTION      HYSTERECTOMY      KNEE ARTHROSCOPY Left     Partial         FAMILY HISTORY  Family History   Problem Relation Age of Onset    Hypertension Mother     Stroke Mother     Heart disease Father     Hypertension Father     Cancer Brother     Cancer Son          SOCIAL HISTORY  Social History     Socioeconomic History    Marital status:    Tobacco Use    Smoking status: Never    Smokeless tobacco: Never    Tobacco comments:     caffiene daily   Vaping Use    Vaping Use: Never used   Substance and Sexual Activity    Alcohol use: Not Currently     Alcohol/week: 2.0 standard drinks     Types: 1 Glasses of wine, 1 Shots of liquor per week    Drug use: No    Sexual activity: Defer     Partners: Male         ALLERGIES  Amlodipine besylate, Aspirin, Bactrim [sulfamethoxazole-trimethoprim], Erythromycin, Levaquin [levofloxacin], Nitrofurantoin, and Ramipril        REVIEW OF SYSTEMS  Review of Systems   Respiratory:  Positive for cough and shortness of breath.           PHYSICAL EXAM  ED Triage Vitals   Temp Pulse Resp BP SpO2   -- -- -- -- --      Temp src Heart Rate Source  Patient Position BP Location FiO2 (%)   -- -- -- -- --       Physical Exam      GENERAL: no acute distress  HENT: nares patent  EYES: no scleral icterus  CV: regular rhythm, normal rate  RESPIRATORY: normal effort, coarse breath sounds bilaterally, on supplementary O2  ABDOMEN: soft  MUSCULOSKELETAL: no deformity  NEURO: alert, moves all extremities, follows commands  PSYCH:  calm, cooperative  SKIN: warm, dry    Vital signs and nursing notes reviewed.          LAB RESULTS  Recent Results (from the past 24 hour(s))   Green Top (Gel)    Collection Time: 10/04/23  7:19 PM   Result Value Ref Range    Extra Tube Hold for add-ons.    Lavender Top    Collection Time: 10/04/23  7:19 PM   Result Value Ref Range    Extra Tube hold for add-on    Gold Top - SST    Collection Time: 10/04/23  7:19 PM   Result Value Ref Range    Extra Tube Hold for add-ons.    Light Blue Top    Collection Time: 10/04/23  7:19 PM   Result Value Ref Range    Extra Tube Hold for add-ons.    CBC Auto Differential    Collection Time: 10/04/23  7:19 PM    Specimen: Blood   Result Value Ref Range    WBC 13.91 (H) 3.40 - 10.80 10*3/mm3    RBC 2.99 (L) 3.77 - 5.28 10*6/mm3    Hemoglobin 9.6 (L) 12.0 - 15.9 g/dL    Hematocrit 29.0 (L) 34.0 - 46.6 %    MCV 97.0 79.0 - 97.0 fL    MCH 32.1 26.6 - 33.0 pg    MCHC 33.1 31.5 - 35.7 g/dL    RDW 13.8 12.3 - 15.4 %    RDW-SD 47.8 37.0 - 54.0 fl    MPV 10.4 6.0 - 12.0 fL    Platelets 278 140 - 450 10*3/mm3    Neutrophil % 77.5 (H) 42.7 - 76.0 %    Lymphocyte % 10.6 (L) 19.6 - 45.3 %    Monocyte % 10.1 5.0 - 12.0 %    Eosinophil % 0.8 0.3 - 6.2 %    Basophil % 0.3 0.0 - 1.5 %    Immature Grans % 0.7 (H) 0.0 - 0.5 %    Neutrophils, Absolute 10.79 (H) 1.70 - 7.00 10*3/mm3    Lymphocytes, Absolute 1.47 0.70 - 3.10 10*3/mm3    Monocytes, Absolute 1.40 (H) 0.10 - 0.90 10*3/mm3    Eosinophils, Absolute 0.11 0.00 - 0.40 10*3/mm3    Basophils, Absolute 0.04 0.00 - 0.20 10*3/mm3    Immature Grans, Absolute 0.10 (H) 0.00 -  0.05 10*3/mm3    nRBC 0.1 0.0 - 0.2 /100 WBC   ECG 12 Lead Dyspnea    Collection Time: 10/04/23  7:37 PM   Result Value Ref Range    QT Interval 326 ms    QTC Interval 383 ms   Comprehensive Metabolic Panel    Collection Time: 10/04/23  7:53 PM    Specimen: Arm, Right; Blood   Result Value Ref Range    Glucose 135 (H) 65 - 99 mg/dL    BUN 31 (H) 8 - 23 mg/dL    Creatinine 1.14 (H) 0.57 - 1.00 mg/dL    Sodium 140 136 - 145 mmol/L    Potassium 3.4 (L) 3.5 - 5.2 mmol/L    Chloride 99 98 - 107 mmol/L    CO2 27.0 22.0 - 29.0 mmol/L    Calcium 9.3 8.2 - 9.6 mg/dL    Total Protein 6.7 6.0 - 8.5 g/dL    Albumin 3.7 3.5 - 5.2 g/dL    ALT (SGPT) 13 1 - 33 U/L    AST (SGOT) 21 1 - 32 U/L    Alkaline Phosphatase 114 39 - 117 U/L    Total Bilirubin 0.4 0.0 - 1.2 mg/dL    Globulin 3.0 gm/dL    A/G Ratio 1.2 g/dL    BUN/Creatinine Ratio 27.2 (H) 7.0 - 25.0    Anion Gap 14.0 5.0 - 15.0 mmol/L    eGFR 45.0 (L) >60.0 mL/min/1.73   BNP    Collection Time: 10/04/23  7:53 PM    Specimen: Arm, Right; Blood   Result Value Ref Range    proBNP 8,193.0 (H) 0.0 - 1,800.0 pg/mL   Single High Sensitivity Troponin T    Collection Time: 10/04/23  7:53 PM    Specimen: Arm, Right; Blood   Result Value Ref Range    HS Troponin T 31 (H) <10 ng/L       Ordered the above labs and reviewed the results.        RADIOLOGY  XR Chest 1 View    Result Date: 10/4/2023  CHEST SINGLE VIEW  HISTORY: CHF, COPD. Follow-up exam  COMPARISON: CT chest 09/20/2023, AP chest 08/04/2023, PA and lateral chest 04/098/2023  FINDINGS: High resolution chest CT 09/20/2023 demonstrated right middle lobe bronchiectasis with partial collapse of the right middle lobe and this is without evidence for change. There are chronic appearing increased interstitial markings and there is subtle hazy increased opacity within the left midlung and right base potentially due to mild superimposed infiltrate. It would be difficult to exclude asymmetric areas of pulmonary edema. Cardiomediastinal  silhouette is not changed. Aortic vascular calcifications and cardiac monitor and leads are present.      Chronic appearing increased interstitial markings with subtle hazy increased opacity within the left midlung and right base potentially due to superimposed infiltrate. Asymmetric areas of pulmonary edema are also a consideration. The heart size is enlarged, similar to the previous exam.  This report was finalized on 10/4/2023 8:28 PM by Dr. Ced Bonilla M.D on Workstation: [id number]       Ordered the above noted radiological studies. Reviewed by me in PACS.              MEDICATIONS GIVEN IN ER  Medications   sodium chloride 0.9 % flush 10 mL (has no administration in time range)   albuterol (PROVENTIL) nebulizer solution 0.083% 2.5 mg/3mL (has no administration in time range)   furosemide (LASIX) injection 40 mg (has no administration in time range)                   MEDICAL DECISION MAKING, PROGRESS, and CONSULTS    All labs have been independently reviewed by me.  All radiology studies have been reviewed by me and I have also reviewed the radiology report.   EKG's independently viewed and interpreted by me.  Discussion below represents my analysis of pertinent findings related to patient's condition, differential diagnosis, treatment plan and final disposition.      Additional sources:  - Discussed/ obtained information from independent historians: Family members present at bedside, EMS    - External (non-ED) record review: Discharge summary from 8/6/2023 reviewed and notable for history of diastolic heart failure, HTN, HLD, A-fib who presented for evaluation of shortness of breath.  Patient ultimately seen by pulmonology and cardiology and treated with IV diuretics and was able to be discharged home.    - Chronic or social conditions impacting care: Diastolic heart failure, A-fib, CAD, bronchiectasis    - Shared decision making: Utilizing shared decision-making techniques we discussed the findings and  plan going forward.  At this time with elected proceed with inpatient management.      Orders placed during this visit:  Orders Placed This Encounter   Procedures    XR Chest 1 View    [place] Draw    Comprehensive Metabolic Panel    BNP    Single High Sensitivity Troponin T    CBC Auto Differential    Urinalysis With Microscopic If Indicated (No Culture) - Urine, Clean Catch    Continuous Pulse Oximetry    Vital Signs    [facility] (on-call MD unless specified) Details    Oxygen Therapy- Nasal Cannula; Titrate 1-6 LPM Per SpO2; 90 - 95%    ECG 12 Lead Dyspnea    Insert Peripheral IV    CBC & Differential    Green Top (Gel)    Lavender Top    Gold Top - SST    Light Blue Top         Differential diagnosis includes but is not limited to:    Pneumonia, CHF exacerbation, viral URI      Independent interpretation of labs, radiology studies, and discussions with consultants:  ED Course as of 10/04/23 2103   Wed Oct 04, 2023   1940 WBC(!): 13.91 [MW]   1940 Hemoglobin(!): 9.6 [MW]   2038 Chest x-ray interpreted by me demonstrates increasing pulmonary edema [MW]   2038 proBNP(!): 8,193.0 [MW]   2103 Discussed patient case with [employer] with [facility] who agrees to admit for further evaluation and management [MW]      ED Course User Index  [MW] Karel James MD     DIAGNOSIS  Final diagnoses:   Acute on chronic diastolic congestive heart failure         DISPOSITION  ED Disposition       ED Disposition   Decision to Admit    Condition   --    Comment   Level of Care: Telemetry [5]   Diagnosis: Acute exacerbation of CHF (congestive heart failure) [062361]   Admitting Physician: SHELBY ESPINOZA [[id number]]   Attending Physician: SHELBY ESPINOZA [[id number]]                           Latest Documented Vital Signs:  As of 20:50 EDT  BP- 117/66 HR- 92 Temp- 98.6 øF (37 øC) (Tympanic) O2 sat- 94%              --    Please note that portions of this were completed with a voice recognition program.       Note Disclaimer: At [facility], we believe  that sharing information builds trust and better relationships. You are receiving this note because you are receiving care at [facility] or recently visited. It is possible you will see health information before a provider has talked with you about it. This kind of information can be easy to misunderstand. To help you fully understand what it means for your health, we urge you to discuss this note with your provider.             Karel James MD  10/04/23 3044

## 2023-10-05 PROBLEM — I50.33 ACUTE ON CHRONIC DIASTOLIC CONGESTIVE HEART FAILURE: Status: ACTIVE | Noted: 2023-10-04

## 2023-10-05 PROBLEM — I50.33 ACUTE ON CHRONIC DIASTOLIC HEART FAILURE: Status: ACTIVE | Noted: 2023-10-05

## 2023-10-05 LAB
ANION GAP SERPL CALCULATED.3IONS-SCNC: 10.5 MMOL/L (ref 5–15)
B PARAPERT DNA SPEC QL NAA+PROBE: NOT DETECTED
B PERT DNA SPEC QL NAA+PROBE: NOT DETECTED
BUN SERPL-MCNC: 24 MG/DL (ref 8–23)
BUN/CREAT SERPL: 23.8 (ref 7–25)
C PNEUM DNA NPH QL NAA+NON-PROBE: NOT DETECTED
CALCIUM SPEC-SCNC: 9.5 MG/DL (ref 8.2–9.6)
CHLORIDE SERPL-SCNC: 102 MMOL/L (ref 98–107)
CO2 SERPL-SCNC: 31.5 MMOL/L (ref 22–29)
CREAT SERPL-MCNC: 1.01 MG/DL (ref 0.57–1)
DEPRECATED RDW RBC AUTO: 47.5 FL (ref 37–54)
EGFRCR SERPLBLD CKD-EPI 2021: 52 ML/MIN/1.73
ERYTHROCYTE [DISTWIDTH] IN BLOOD BY AUTOMATED COUNT: 13.7 % (ref 12.3–15.4)
FLUAV SUBTYP SPEC NAA+PROBE: NOT DETECTED
FLUBV RNA ISLT QL NAA+PROBE: NOT DETECTED
GLUCOSE SERPL-MCNC: 121 MG/DL (ref 65–99)
HADV DNA SPEC NAA+PROBE: NOT DETECTED
HCOV 229E RNA SPEC QL NAA+PROBE: NOT DETECTED
HCOV HKU1 RNA SPEC QL NAA+PROBE: NOT DETECTED
HCOV NL63 RNA SPEC QL NAA+PROBE: NOT DETECTED
HCOV OC43 RNA SPEC QL NAA+PROBE: NOT DETECTED
HCT VFR BLD AUTO: 30 % (ref 34–46.6)
HGB BLD-MCNC: 10 G/DL (ref 12–15.9)
HMPV RNA NPH QL NAA+NON-PROBE: NOT DETECTED
HPIV1 RNA ISLT QL NAA+PROBE: NOT DETECTED
HPIV2 RNA SPEC QL NAA+PROBE: NOT DETECTED
HPIV3 RNA NPH QL NAA+PROBE: NOT DETECTED
HPIV4 P GENE NPH QL NAA+PROBE: NOT DETECTED
IRON 24H UR-MRATE: 24 MCG/DL (ref 37–145)
IRON SATN MFR SERPL: 10 % (ref 20–50)
M PNEUMO IGG SER IA-ACNC: NOT DETECTED
MCH RBC QN AUTO: 31.9 PG (ref 26.6–33)
MCHC RBC AUTO-ENTMCNC: 33.3 G/DL (ref 31.5–35.7)
MCV RBC AUTO: 95.8 FL (ref 79–97)
PLATELET # BLD AUTO: 265 10*3/MM3 (ref 140–450)
PMV BLD AUTO: 9.9 FL (ref 6–12)
POTASSIUM SERPL-SCNC: 3.1 MMOL/L (ref 3.5–5.2)
POTASSIUM SERPL-SCNC: 3.9 MMOL/L (ref 3.5–5.2)
PROCALCITONIN SERPL-MCNC: 0.38 NG/ML (ref 0–0.25)
QT INTERVAL: 326 MS
QTC INTERVAL: 383 MS
RBC # BLD AUTO: 3.13 10*6/MM3 (ref 3.77–5.28)
RHINOVIRUS RNA SPEC NAA+PROBE: NOT DETECTED
RSV RNA NPH QL NAA+NON-PROBE: NOT DETECTED
SARS-COV-2 RNA NPH QL NAA+NON-PROBE: NOT DETECTED
SODIUM SERPL-SCNC: 144 MMOL/L (ref 136–145)
TIBC SERPL-MCNC: 240 MCG/DL (ref 298–536)
TRANSFERRIN SERPL-MCNC: 161 MG/DL (ref 200–360)
WBC NRBC COR # BLD: 12.21 10*3/MM3 (ref 3.4–10.8)

## 2023-10-05 PROCEDURE — 25010000002 DIGOXIN PER 500 MCG: Performed by: INTERNAL MEDICINE

## 2023-10-05 PROCEDURE — 94799 UNLISTED PULMONARY SVC/PX: CPT

## 2023-10-05 PROCEDURE — 25010000002 FUROSEMIDE PER 20 MG: Performed by: HOSPITALIST

## 2023-10-05 PROCEDURE — 36415 COLL VENOUS BLD VENIPUNCTURE: CPT | Performed by: NURSE PRACTITIONER

## 2023-10-05 PROCEDURE — 85027 COMPLETE CBC AUTOMATED: CPT | Performed by: NURSE PRACTITIONER

## 2023-10-05 PROCEDURE — 80048 BASIC METABOLIC PNL TOTAL CA: CPT | Performed by: NURSE PRACTITIONER

## 2023-10-05 PROCEDURE — 84466 ASSAY OF TRANSFERRIN: CPT | Performed by: NURSE PRACTITIONER

## 2023-10-05 PROCEDURE — 84145 PROCALCITONIN (PCT): CPT | Performed by: HOSPITALIST

## 2023-10-05 PROCEDURE — 94761 N-INVAS EAR/PLS OXIMETRY MLT: CPT

## 2023-10-05 PROCEDURE — 83540 ASSAY OF IRON: CPT | Performed by: NURSE PRACTITIONER

## 2023-10-05 PROCEDURE — 94664 DEMO&/EVAL PT USE INHALER: CPT

## 2023-10-05 PROCEDURE — 84132 ASSAY OF SERUM POTASSIUM: CPT | Performed by: INTERNAL MEDICINE

## 2023-10-05 PROCEDURE — 99222 1ST HOSP IP/OBS MODERATE 55: CPT | Performed by: INTERNAL MEDICINE

## 2023-10-05 PROCEDURE — 94669 MECHANICAL CHEST WALL OSCILL: CPT

## 2023-10-05 RX ORDER — METOPROLOL TARTRATE 50 MG/1
50 TABLET, FILM COATED ORAL NIGHTLY
COMMUNITY
End: 2023-10-10 | Stop reason: HOSPADM

## 2023-10-05 RX ORDER — BUDESONIDE AND FORMOTEROL FUMARATE DIHYDRATE 160; 4.5 UG/1; UG/1
2 AEROSOL RESPIRATORY (INHALATION)
Status: DISCONTINUED | OUTPATIENT
Start: 2023-10-05 | End: 2023-10-10 | Stop reason: HOSPADM

## 2023-10-05 RX ORDER — FLUTICASONE PROPIONATE 50 MCG
2 SPRAY, SUSPENSION (ML) NASAL NIGHTLY PRN
Status: DISCONTINUED | OUTPATIENT
Start: 2023-10-05 | End: 2023-10-10 | Stop reason: HOSPADM

## 2023-10-05 RX ORDER — METOPROLOL TARTRATE 50 MG/1
100 TABLET, FILM COATED ORAL EVERY MORNING
Status: DISCONTINUED | OUTPATIENT
Start: 2023-10-05 | End: 2023-10-05

## 2023-10-05 RX ORDER — IPRATROPIUM BROMIDE AND ALBUTEROL SULFATE 2.5; .5 MG/3ML; MG/3ML
3 SOLUTION RESPIRATORY (INHALATION) 2 TIMES DAILY
Status: DISCONTINUED | OUTPATIENT
Start: 2023-10-05 | End: 2023-10-05

## 2023-10-05 RX ORDER — BENZONATATE 100 MG/1
200 CAPSULE ORAL 3 TIMES DAILY PRN
Status: DISCONTINUED | OUTPATIENT
Start: 2023-10-05 | End: 2023-10-10 | Stop reason: HOSPADM

## 2023-10-05 RX ORDER — DIGOXIN 0.25 MG/ML
250 INJECTION INTRAMUSCULAR; INTRAVENOUS ONCE
Status: COMPLETED | OUTPATIENT
Start: 2023-10-05 | End: 2023-10-05

## 2023-10-05 RX ORDER — METOPROLOL TARTRATE 50 MG/1
50 TABLET, FILM COATED ORAL NIGHTLY
Status: DISCONTINUED | OUTPATIENT
Start: 2023-10-05 | End: 2023-10-06

## 2023-10-05 RX ORDER — GUAIFENESIN/DEXTROMETHORPHAN 100-10MG/5
5 SYRUP ORAL EVERY 6 HOURS PRN
Status: DISCONTINUED | OUTPATIENT
Start: 2023-10-05 | End: 2023-10-10 | Stop reason: HOSPADM

## 2023-10-05 RX ORDER — GUAIFENESIN 600 MG/1
600 TABLET, EXTENDED RELEASE ORAL 2 TIMES DAILY
Status: DISCONTINUED | OUTPATIENT
Start: 2023-10-05 | End: 2023-10-10 | Stop reason: HOSPADM

## 2023-10-05 RX ORDER — CITALOPRAM HYDROBROMIDE 10 MG/1
10 TABLET ORAL DAILY
Status: DISCONTINUED | OUTPATIENT
Start: 2023-10-05 | End: 2023-10-10 | Stop reason: HOSPADM

## 2023-10-05 RX ORDER — METOPROLOL TARTRATE 50 MG/1
100 TABLET, FILM COATED ORAL DAILY
Status: DISCONTINUED | OUTPATIENT
Start: 2023-10-05 | End: 2023-10-06

## 2023-10-05 RX ORDER — FUROSEMIDE 10 MG/ML
40 INJECTION INTRAMUSCULAR; INTRAVENOUS EVERY 8 HOURS
Status: DISCONTINUED | OUTPATIENT
Start: 2023-10-05 | End: 2023-10-05

## 2023-10-05 RX ORDER — SODIUM CHLORIDE FOR INHALATION 0.9 %
3 VIAL, NEBULIZER (ML) INHALATION 2 TIMES DAILY
Status: DISCONTINUED | OUTPATIENT
Start: 2023-10-05 | End: 2023-10-05

## 2023-10-05 RX ORDER — FERROUS SULFATE 325(65) MG
325 TABLET ORAL
Status: DISCONTINUED | OUTPATIENT
Start: 2023-10-05 | End: 2023-10-10 | Stop reason: HOSPADM

## 2023-10-05 RX ORDER — MULTIPLE VITAMINS W/ MINERALS TAB 9MG-400MCG
1 TAB ORAL 2 TIMES DAILY
Status: DISCONTINUED | OUTPATIENT
Start: 2023-10-05 | End: 2023-10-10 | Stop reason: HOSPADM

## 2023-10-05 RX ORDER — ALBUTEROL SULFATE 2.5 MG/3ML
2.5 SOLUTION RESPIRATORY (INHALATION) EVERY 4 HOURS PRN
Status: DISCONTINUED | OUTPATIENT
Start: 2023-10-05 | End: 2023-10-10 | Stop reason: HOSPADM

## 2023-10-05 RX ORDER — SODIUM CHLORIDE FOR INHALATION 0.9 %
3 VIAL, NEBULIZER (ML) INHALATION 3 TIMES DAILY
Status: DISCONTINUED | OUTPATIENT
Start: 2023-10-05 | End: 2023-10-10 | Stop reason: HOSPADM

## 2023-10-05 RX ORDER — MELATONIN
1000 DAILY
Status: DISCONTINUED | OUTPATIENT
Start: 2023-10-05 | End: 2023-10-09

## 2023-10-05 RX ORDER — POTASSIUM CHLORIDE 750 MG/1
20 TABLET, FILM COATED, EXTENDED RELEASE ORAL DAILY
Status: DISCONTINUED | OUTPATIENT
Start: 2023-10-05 | End: 2023-10-10 | Stop reason: HOSPADM

## 2023-10-05 RX ORDER — FUROSEMIDE 10 MG/ML
40 INJECTION INTRAMUSCULAR; INTRAVENOUS EVERY 8 HOURS
Status: DISCONTINUED | OUTPATIENT
Start: 2023-10-05 | End: 2023-10-08

## 2023-10-05 RX ORDER — AZITHROMYCIN 250 MG/1
250 TABLET, FILM COATED ORAL
Status: DISCONTINUED | OUTPATIENT
Start: 2023-10-05 | End: 2023-10-10 | Stop reason: HOSPADM

## 2023-10-05 RX ORDER — POTASSIUM CHLORIDE 750 MG/1
40 TABLET, FILM COATED, EXTENDED RELEASE ORAL EVERY 4 HOURS
Status: COMPLETED | OUTPATIENT
Start: 2023-10-05 | End: 2023-10-05

## 2023-10-05 RX ORDER — L.ACID,PARA/B.BIFIDUM/S.THERM 8B CELL
1 CAPSULE ORAL DAILY
Status: DISCONTINUED | OUTPATIENT
Start: 2023-10-05 | End: 2023-10-10 | Stop reason: HOSPADM

## 2023-10-05 RX ORDER — IPRATROPIUM BROMIDE AND ALBUTEROL SULFATE 2.5; .5 MG/3ML; MG/3ML
3 SOLUTION RESPIRATORY (INHALATION)
Status: DISCONTINUED | OUTPATIENT
Start: 2023-10-05 | End: 2023-10-10 | Stop reason: HOSPADM

## 2023-10-05 RX ORDER — CETIRIZINE HYDROCHLORIDE 10 MG/1
10 TABLET ORAL DAILY
Status: DISCONTINUED | OUTPATIENT
Start: 2023-10-05 | End: 2023-10-10 | Stop reason: HOSPADM

## 2023-10-05 RX ADMIN — METOPROLOL TARTRATE 50 MG: 50 TABLET, FILM COATED ORAL at 17:59

## 2023-10-05 RX ADMIN — Medication 1000 UNITS: at 08:46

## 2023-10-05 RX ADMIN — MULTIPLE VITAMINS W/ MINERALS TAB 1 TABLET: TAB at 20:17

## 2023-10-05 RX ADMIN — FUROSEMIDE 40 MG: 10 INJECTION, SOLUTION INTRAMUSCULAR; INTRAVENOUS at 21:45

## 2023-10-05 RX ADMIN — BUDESONIDE AND FORMOTEROL FUMARATE DIHYDRATE 2 PUFF: 160; 4.5 AEROSOL RESPIRATORY (INHALATION) at 19:26

## 2023-10-05 RX ADMIN — FERROUS SULFATE TAB 325 MG (65 MG ELEMENTAL FE) 325 MG: 325 (65 FE) TAB at 08:45

## 2023-10-05 RX ADMIN — BENZONATATE 200 MG: 100 CAPSULE ORAL at 23:54

## 2023-10-05 RX ADMIN — GUAIFENESIN AND DEXTROMETHORPHAN 5 ML: 100; 10 SYRUP ORAL at 21:50

## 2023-10-05 RX ADMIN — POTASSIUM CHLORIDE 40 MEQ: 750 TABLET, EXTENDED RELEASE ORAL at 08:45

## 2023-10-05 RX ADMIN — BENZONATATE 200 MG: 100 CAPSULE ORAL at 15:46

## 2023-10-05 RX ADMIN — IPRATROPIUM BROMIDE AND ALBUTEROL SULFATE 3 ML: 2.5; .5 SOLUTION RESPIRATORY (INHALATION) at 19:23

## 2023-10-05 RX ADMIN — CITALOPRAM 10 MG: 10 TABLET, FILM COATED ORAL at 08:45

## 2023-10-05 RX ADMIN — IPRATROPIUM BROMIDE AND ALBUTEROL SULFATE 3 ML: .5; 3 SOLUTION RESPIRATORY (INHALATION) at 07:12

## 2023-10-05 RX ADMIN — FUROSEMIDE 40 MG: 10 INJECTION, SOLUTION INTRAMUSCULAR; INTRAVENOUS at 13:09

## 2023-10-05 RX ADMIN — Medication 1 CAPSULE: at 08:45

## 2023-10-05 RX ADMIN — CETIRIZINE HYDROCHLORIDE 10 MG: 10 TABLET ORAL at 08:45

## 2023-10-05 RX ADMIN — FUROSEMIDE 40 MG: 10 INJECTION, SOLUTION INTRAMUSCULAR; INTRAVENOUS at 06:01

## 2023-10-05 RX ADMIN — DIGOXIN 250 MCG: 0.25 INJECTION INTRAMUSCULAR; INTRAVENOUS at 20:16

## 2023-10-05 RX ADMIN — ISODIUM CHLORIDE 3 ML: 0.03 SOLUTION RESPIRATORY (INHALATION) at 07:13

## 2023-10-05 RX ADMIN — ALBUTEROL SULFATE 2.5 MG: 2.5 SOLUTION RESPIRATORY (INHALATION) at 16:37

## 2023-10-05 RX ADMIN — BUDESONIDE AND FORMOTEROL FUMARATE DIHYDRATE 2 PUFF: 160; 4.5 AEROSOL RESPIRATORY (INHALATION) at 07:14

## 2023-10-05 RX ADMIN — GUAIFENESIN 600 MG: 600 TABLET, EXTENDED RELEASE ORAL at 08:45

## 2023-10-05 RX ADMIN — POTASSIUM CHLORIDE 20 MEQ: 750 TABLET, EXTENDED RELEASE ORAL at 13:10

## 2023-10-05 RX ADMIN — METOPROLOL TARTRATE 100 MG: 50 TABLET, FILM COATED ORAL at 08:45

## 2023-10-05 RX ADMIN — AZITHROMYCIN 250 MG: 250 TABLET, FILM COATED ORAL at 08:45

## 2023-10-05 RX ADMIN — POTASSIUM CHLORIDE 40 MEQ: 750 TABLET, EXTENDED RELEASE ORAL at 13:09

## 2023-10-05 RX ADMIN — GUAIFENESIN 600 MG: 600 TABLET, EXTENDED RELEASE ORAL at 20:17

## 2023-10-05 RX ADMIN — MULTIPLE VITAMINS W/ MINERALS TAB 1 TABLET: TAB at 08:45

## 2023-10-05 RX ADMIN — POTASSIUM CHLORIDE 40 MEQ: 750 TABLET, EXTENDED RELEASE ORAL at 17:59

## 2023-10-05 NOTE — PROGRESS NOTES
Name: Agnieszka Rizzo ADMIT: 10/4/2023   : 1930  PCP: Matt Rose MD    MRN: [id number] LOS: 0 days   AGE/SEX: 89 y.o. female  ROOM: [facility]     Subjective   Subjective   Patient is seen at bedside, no new complaints.       Objective   Objective   Vital Signs  Temp:  [98.2 øF (36.8 øC)-99.3 øF (37.4 øC)] 99.3 øF (37.4 øC)  Heart Rate:  [] 148  Resp:  [18-20] 20  BP: (104-131)/(62-96) 104/62  SpO2:  [88 %-100 %] 100 %  on  Flow (L/min):  [1-2] 2;   Device (Oxygen Therapy): nasal cannula  Body mass index is 22.05 kg/mý.  Physical Exam  General, awake and alert.  Head and ENT, normocephalic and atraumatic.  Lungs, symmetric expansion, equal air entry bilaterally.  Heart, regular rate and rhythm.  Abdomen, soft and nontender.  Extremities, no clubbing or cyanosis.  Neuro, no focal deficits.  Skin: Warm and no rash.  Psych, normal mood and affect.  Musculoskeletal, joint examination is grossly normal.    Copied text material from yesterday's note has been reviewed for appropriate changes and remains accurate as of 10/05/23.        Results Review     I reviewed the patient's new clinical results.  Results from last 7 days   Lab Units 10/05/23  0352 10/04/23  1919   WBC 10*3/mm3 12.21* 13.91*   HEMOGLOBIN g/dL 10.0* 9.6*   PLATELETS 10*3/mm3 265 278     Results from last 7 days   Lab Units 10/05/23  0352 10/04/23  1953   SODIUM mmol/L 144 140   POTASSIUM mmol/L 3.1* 3.4*   CHLORIDE mmol/L 102 99   CO2 mmol/L 31.5* 27.0   BUN mg/dL 24* 31*   CREATININE mg/dL 1.01* 1.14*   GLUCOSE mg/dL 121* 135*   EGFR mL/min/1.73 52.0* 45.0*     Results from last 7 days   Lab Units 10/04/23  1953   ALBUMIN g/dL 3.7   BILIRUBIN mg/dL 0.4   ALK PHOS U/L 114   AST (SGOT) U/L 21   ALT (SGPT) U/L 13     Results from last 7 days   Lab Units 10/05/23  0352 10/04/23  1953   CALCIUM mg/dL 9.5 9.3   ALBUMIN g/dL  --  3.7     Results from last 7 days   Lab Units 10/05/23  0352   PROCALCITONIN ng/mL 0.38*     No results found for:  HGBA1C, POCGLU    XR Chest 1 View    Result Date: 10/4/2023  Chronic appearing increased interstitial markings with subtle hazy increased opacity within the left midlung and right base potentially due to superimposed infiltrate. Asymmetric areas of pulmonary edema are also a consideration. The heart size is enlarged, similar to the previous exam.  This report was finalized on 10/4/2023 8:28 PM by Dr. Ced Bonilla M.D on Workstation: [id number]       I have personally reviewed all medications:  Scheduled Medications  azithromycin, 250 mg, Oral, Q24H  budesonide-formoterol, 2 puff, Inhalation, BID - RT  cetirizine, 10 mg, Oral, Daily  cholecalciferol, 1,000 Units, Oral, Daily  citalopram, 10 mg, Oral, Daily  ferrous sulfate, 325 mg, Oral, Daily With Breakfast  furosemide, 40 mg, Intravenous, Q8H  guaiFENesin, 600 mg, Oral, BID  ipratropium-albuterol, 3 mL, Nebulization, Q6H While Awake - RT  lactobacillus acidophilus, 1 capsule, Oral, Daily  metoprolol tartrate, 100 mg, Oral, Daily  metoprolol tartrate, 50 mg, Oral, Nightly  multivitamin with minerals, 1 tablet, Oral, BID  potassium chloride, 20 mEq, Oral, Daily  sodium chloride, 3 mL, Nebulization, TID    Infusions   Diet  Diet: Cardiac Diets; Healthy Heart (2-3 Na+); Texture: Regular Texture (IDDSI 7); Fluid Consistency: Thin (IDDSI 0)    I have personally reviewed:  [x]  Laboratory   [x]  Microbiology   [x]  Radiology   [x]  EKG/Telemetry  [x]  Cardiology/Vascular   []  Pathology    []  Records       Assessment/Plan     Active Hospital Problems    Diagnosis  POA    **Acute on chronic diastolic heart failure [I50.33]  Yes    Acute on chronic diastolic congestive heart failure [I50.33]  Yes    Paroxysmal atrial fibrillation [I48.0]  Yes    Primary hypertension [I10]  Yes    Chronic coronary artery disease [I25.10]  Yes      Resolved Hospital Problems   No resolved problems to display.       89 y.o. female admitted with Acute on chronic diastolic heart  failure.    Acute exacerbation of CHF (congestive heart failure)  Continue IV lasix , follow-up with cardiology recommendations.    Acute on chronic respiratory failure  Asthma  Follow-up with pulmonary recommendations.     Proximal atrial fibrillation  Chronic  AC Eliquis- last dose this evening   RC metoprolol     Primary hypertension  Chronic   Home metoprolol     Iron deficiency anemia  Monitor hemoglobin trend.        I discussed the patient's findings and my recommendations with patient and family.     VTE Prophylaxis - Eliquis (home med).  Code Status - DNR.      Skinny Serna MD  [facility]  10/05/23  18:00 EDT

## 2023-10-05 NOTE — H&P
Patient Name:  Agnieszka Rizzo  YOB: 1930  MRN:  [id number]  Admit Date:  10/4/2023  Patient Care Team:  Matt Rose MD as PCP - General (Family Medicine)  Marcie Robbins MD as Consulting Physician (Cardiology)  Nilesh Danielle MD as Consulting Physician (Urology)  Lina Morris RPH as Pharmacist  Lev Mckeon PharmD as Pharmacist (Pharmacy)  Rogelio Tucker MD as Consulting Physician (Pulmonary Disease)      Subjective   History Present Illness     Chief Complaint   Patient presents with    Shortness of Breath     History of Present Illness Ms. Rizzo is a 89 year old female with a history of CHF, Atrial fibrillation, chronic anticoagulation with Eliquis,  bronchiectasis CAD,aspergillus, no chronic azithromycin, and  oxygen dependence  who presents to  with complaints of worsening shortness of breath since her bronchoscopy two days ago with Dr Tucker.  She states that shortness of breath comes and goes with no obvious exacerbating factors.  She also reports her cough has also been worse since her bronchoscopy.  She does confirm orthopnea.  She reports that she has used her inhalers, and vest without improvement.  She was is being on 2 L of oxygen at at bedtime normally, but has been on 3 L constantly due to the shortness of breath.  She is on torsemide at home, and states she has not missed any doses.  She denies any actual chest pain, but she does complain of chest and abdominal soreness from the coughing.  She denies any other acute distress chest pain, palpitations, lightheadedness, dizziness, fever, chills, abdominal pain, nausea, vomiting, diarrhea, constipation, melena, hematemesis, hemoptysis, hematuria or other  symptoms.   Her daughter at bedside expresses concern over possible UTI,. Her daughter reports she has noticed increasing confusion recently.  She also reports sudden shaking and feeling like she cannot get warm, with out fever.   She denies any  other acute distress chest pain, palpitations, lightheadedness, dizziness, fever, chills,  Upon exam she is alert, oriented in no acute distress, requiring 2 L of oxygen at this time.  There are noted bilateral rails, her heart rate is normal, rhythm irregular, abdomen is soft, round, nondistended, nontender, with audible bowel sounds throughout.  She has trace bilateral lower extremity edema , that she confirms is her baseline    Initial evaluation in the emergency department reveals she is hemodynamically stable with blood pressure of 113/80, heart rate 81, respiratory rate 20, oxygen saturation 96% on 2 L, she is afebrile with a Tmax of 98.6  Initial high-sensitivity troponins elevated 31, her proBNP is also elevated 8.139, her creatinine is also slightly bumped from last creatinine of 0.93 on August 21 to 1.14 from 0.93.  WBCs are also slightly elevated at 13.91 hemoglobin is 9.6 and hematocrit 29.0 today down from 11.22 and 33.2 two months ago.  Chest x-ray is remarkable for pulmonary edema, with noted cardiac enlargement, and chronic interstitial markings.   EKG interpreted as atrial fibrillation heart rate controlled at 83.  She was administered 40mg of IV lasix.     Ms. Rizzo will be admitted to the hospitalist group for further evaluation and treatment of worsening shortness of breath and other acute and or chronic conditions as needed.    Review of Systems   Eyes: Negative.    Respiratory:  Positive for cough and shortness of breath.    Cardiovascular: Negative.    Gastrointestinal: Negative.    Endocrine: Negative.    Genitourinary: Negative.    Musculoskeletal: Negative.    Skin: Negative.    Allergic/Immunologic: Negative.    Neurological: Negative.    Hematological: Negative.    Psychiatric/Behavioral: Negative.     All other systems reviewed and are negative.     Personal History     Past Medical History:   Diagnosis Date    Aspergillus     Asthma     Atrial fibrillation     chronic    Atrial  flutter     Bronchiectasis     C. difficile diarrhea 03/11/2017    CAD (coronary artery disease)     nonobstructive    Chronic diastolic CHF (congestive heart failure)     Colitis     Cough     Cryoglobulinemia     Dyspnea on exertion     Fall     Hyperlipidemia     Hypertension     Hyponatremia     Hypoxia     Infectious viral hepatitis     AGE 13    Left shoulder pain     Leg swelling     Lesion of lung     Malignant hyperthermia due to anesthesia     Mild tricuspid regurgitation     MR (mitral regurgitation)     mild    MVP (mitral valve prolapse)     Permanent atrial fibrillation     Pneumonia with the fungal infection aspergillosis 01/06/2017    Pneumothorax     SOB (shortness of breath)     UTI (urinary tract infection)     Wheeze     mild     Past Surgical History:   Procedure Laterality Date    BRONCHOSCOPY N/A 11/12/2016    Procedure: BRONCHOSCOPY WITH FLUORO, BRUSHINGS, BAL, AND BIOPSIES;  Surgeon: Rogelio Tucker MD;  Location: [facility] ENDOSCOPY;  Service:     BRONCHOSCOPY Bilateral 06/03/2017    Procedure: BRONCHOSCOPY with BAL ;  Surgeon: Sung King MD;  Location: [facility] ENDOSCOPY;  Service:     BRONCHOSCOPY N/A 12/17/2019    Procedure: BRONCHOSCOPY WITH WASHINGS;  Surgeon: Rogelio Tucker MD;  Location: [facility] ENDOSCOPY;  Service: Pulmonary    BRONCHOSCOPY N/A 07/15/2022    Procedure: BRONCHOSCOPY;  Surgeon: Rogelio Tucker MD;  Location: [facility] ENDOSCOPY;  Service: Pulmonary;  Laterality: N/A;  Pre: Pneumonia  Post: Pneumonia    BRONCHOSCOPY N/A 10/2/2023    Procedure: BRONCHOSCOPY WITH BRONCHIAL AVEOLAR LAVAGE;  Surgeon: Rogelio Tucker MD;  Location: [facility] ENDOSCOPY;  Service: Pulmonary;  Laterality: N/A;  PRE:BRONCHIECTASIS /   POST: SAME    CATARACT EXTRACTION EXTRACAPSULAR W/ INTRAOCULAR LENS IMPLANTATION      COLONOSCOPY      2013    D & C WITH SUCTION      HYSTERECTOMY      KNEE ARTHROSCOPY Left     Partial     Family History   Problem Relation Age of Onset    Hypertension Mother     Stroke Mother      Heart disease Father     Hypertension Father     Cancer Brother     Cancer Son      Social History     Tobacco Use    Smoking status: Never    Smokeless tobacco: Never    Tobacco comments:     caffiene daily   Vaping Use    Vaping Use: Never used   Substance Use Topics    Alcohol use: Not Currently     Alcohol/week: 2.0 standard drinks     Types: 1 Glasses of wine, 1 Shots of liquor per week    Drug use: No     No current facility-administered medications on file prior to encounter.     Current Outpatient Medications on File Prior to Encounter   Medication Sig Dispense Refill    acetaminophen (TYLENOL) 325 MG tablet Take 2 tablets by mouth Every 4 (Four) Hours As Needed for Moderate Pain.      albuterol sulfate  (90 Base) MCG/ACT inhaler Inhale 2 puffs Every 6 (Six) Hours As Needed for Wheezing or Shortness of Air. 8 g 11    apixaban (ELIQUIS) 2.5 MG tablet tablet Take 1 tablet by mouth Every 12 (Twelve) Hours. Indications: Other - full anticoagulation 180 tablet 3    azithromycin (ZITHROMAX) 250 MG tablet Take 1 tablet by mouth Daily.      benzonatate (TESSALON) 200 MG capsule Take 1 capsule by mouth 3 (Three) Times a Day As Needed. Indications: Cough      cholecalciferol (VITAMIN D3) 25 MCG (1000 UT) tablet Take 1 tablet by mouth Daily.      citalopram (CeleXA) 10 MG tablet Take 1 tablet by mouth Daily. 90 tablet 2    FeroSul 325 (65 Fe) MG tablet TAKE ONE TABLET BY MOUTH DAILY WITH BREAKFAST (Patient taking differently: Take 1 tablet by mouth Daily.) 90 tablet 0    fexofenadine (ALLEGRA) 180 MG tablet Take 1 tablet by mouth Daily.      fluticasone (FLONASE) 50 MCG/ACT nasal spray 2 sprays into the nostril(s) as directed by provider At Night As Needed for Allergies.      fluticasone-salmeterol (ADVAIR HFA) 115-21 MCG/ACT inhaler Inhale 2 puffs 2 (Two) Times a Day.      guaiFENesin (MUCINEX) 600 MG 12 hr tablet Take 1 tablet by mouth 2 (Two) Times a Day.      ipratropium-albuterol (DUO-NEB) 0.5-2.5 mg/3 ml  nebulizer Take 3 mL by nebulization 2 (Two) Times a Day.      Lactobacillus (FLORAJEN ACIDOPHILUS) capsule Take 1 tablet by mouth Daily.      metoprolol tartrate (LOPRESSOR) 100 MG tablet Take 1 tablet by mouth Every Morning for 360 days. 90 tablet 3    metoprolol tartrate (LOPRESSOR) 50 MG tablet Take 1 tablet by mouth Every Night for 360 days. 90 tablet 3    Multiple Vitamins-Minerals (PRESERVISION AREDS PO) Take 1 tablet by mouth 2 (Two) Times a Day.      potassium chloride (K-DUR,KLOR-CON) 20 MEQ CR tablet Take 1 tablet by mouth Daily. 90 tablet 3    sodium chloride 0.9 % nebulizer solution Take 3 mL by nebulization 2 (Two) Times a Day.      torsemide (DEMADEX) 20 MG tablet Take 2 tablets by mouth 2 (Two) Times a Day for 360 days. 360 tablet 3     Allergies   Allergen Reactions    Amlodipine Besylate Swelling    Aspirin GI Intolerance     Caused bleeding ulcers    Bactrim [Sulfamethoxazole-Trimethoprim] Nausea And Vomiting    Erythromycin Unknown (See Comments)     Patient does not recall type of reaction.    Levaquin [Levofloxacin] Unknown (See Comments)     Nausea      Nitrofurantoin Nausea Only and Nausea And Vomiting    Ramipril Other (See Comments)     Cough       Objective    Objective     Vital Signs  Temp:  [98.6 øF (37 øC)] 98.6 øF (37 øC)  Heart Rate:  [81-97] 92  Resp:  [20] 20  BP: (113-118)/(66-80) 117/66  SpO2:  [88 %-96 %] 94 %  on  Flow (L/min):  [2] 2;   Device (Oxygen Therapy): nasal cannula  Body mass index is 21.79 kg/mý.    Physical Exam  Vitals and nursing note reviewed.   Constitutional:       General: She is awake.      Appearance: Normal appearance.      Interventions: Nasal cannula in place.      Comments: 2L   HENT:      Head: Atraumatic.      Mouth/Throat:      Mouth: Mucous membranes are dry.   Eyes:      Conjunctiva/sclera: Conjunctivae normal.   Cardiovascular:      Rate and Rhythm: Normal rate. Rhythm irregular.      Pulses:           Radial pulses are 2+ on the right side and 1+  on the left side.   Pulmonary:      Effort: Pulmonary effort is normal.      Breath sounds: Examination of the right-middle field reveals rales. Examination of the right-lower field reveals rales. Examination of the left-lower field reveals rales. Rales present.   Abdominal:      General: Bowel sounds are normal.      Palpations: Abdomen is soft.   Musculoskeletal:         General: Normal range of motion.      Right lower leg: Edema present.      Left lower leg: Edema present.   Skin:     General: Skin is warm.      Capillary Refill: Capillary refill takes less than 2 seconds.      Findings: Bruising present.   Neurological:      General: No focal deficit present.      Mental Status: She is alert and oriented to person, place, and time.   Psychiatric:         Mood and Affect: Mood normal.         Behavior: Behavior is cooperative.       Results Review:  I reviewed the patient's new clinical results.  I reviewed the patient's new imaging results and agree with the interpretation.  I reviewed the patient's other test results and agree with the interpretation  I personally viewed and interpreted the patient's EKG/Telemetry data  Discussed with ED provider.    Lab Results (last 24 hours)       Procedure Component Value Units Date/Time    CBC & Differential [[id number]]  (Abnormal) Collected: 10/04/23 1919    Specimen: Blood Updated: 10/04/23 1930    Narrative:      The following orders were created for panel order CBC & Differential.  Procedure                               Abnormality         Status                     ---------                               -----------         ------                     CBC Auto Differential[[id number]]        Abnormal            Final result                 Please view results for these tests on the individual orders.    CBC Auto Differential [[id number]]  (Abnormal) Collected: 10/04/23 1919    Specimen: Blood Updated: 10/04/23 1930     WBC 13.91 10*3/mm3      RBC 2.99 10*6/mm3       Hemoglobin 9.6 g/dL      Hematocrit 29.0 %      MCV 97.0 fL      MCH 32.1 pg      MCHC 33.1 g/dL      RDW 13.8 %      RDW-SD 47.8 fl      MPV 10.4 fL      Platelets 278 10*3/mm3      Neutrophil % 77.5 %      Lymphocyte % 10.6 %      Monocyte % 10.1 %      Eosinophil % 0.8 %      Basophil % 0.3 %      Immature Grans % 0.7 %      Neutrophils, Absolute 10.79 10*3/mm3      Lymphocytes, Absolute 1.47 10*3/mm3      Monocytes, Absolute 1.40 10*3/mm3      Eosinophils, Absolute 0.11 10*3/mm3      Basophils, Absolute 0.04 10*3/mm3      Immature Grans, Absolute 0.10 10*3/mm3      nRBC 0.1 /100 WBC     Comprehensive Metabolic Panel [[id number]]  (Abnormal) Collected: 10/04/23 1953    Specimen: Blood from Arm, Right Updated: 10/04/23 2026     Glucose 135 mg/dL      BUN 31 mg/dL      Creatinine 1.14 mg/dL      Sodium 140 mmol/L      Potassium 3.4 mmol/L      Chloride 99 mmol/L      CO2 27.0 mmol/L      Calcium 9.3 mg/dL      Total Protein 6.7 g/dL      Albumin 3.7 g/dL      ALT (SGPT) 13 U/L      AST (SGOT) 21 U/L      Alkaline Phosphatase 114 U/L      Total Bilirubin 0.4 mg/dL      Globulin 3.0 gm/dL      A/G Ratio 1.2 g/dL      BUN/Creatinine Ratio 27.2     Anion Gap 14.0 mmol/L      eGFR 45.0 mL/min/1.73     Narrative:      GFR Normal >60  Chronic Kidney Disease <60  Kidney Failure <15    The GFR formula is only valid for adults with stable renal function between ages 18 and 70.    BNP [[id number]]  (Abnormal) Collected: 10/04/23 1953    Specimen: Blood from Arm, Right Updated: 10/04/23 2023     proBNP 8,193.0 pg/mL     Narrative:      This assay is used as an aid in the diagnosis of individuals suspected of having heart failure. It can be used as an aid in the diagnosis of acute decompensated heart failure (ADHF) in patients presenting with signs and symptoms of ADHF to the emergency department (ED). In addition, NT-proBNP of <300 pg/mL indicates ADHF is not likely.    Age Range Result Interpretation  NT-proBNP Concentration  (pg/mL:      <50             Positive            >450                   Gray                 300-450                    Negative             <300    50-75           Positive            >900                  Gray                300-900                  Negative            <300      >75             Positive            >1800                  Gray                300-1800                  Negative            <300    Single High Sensitivity Troponin T [[id number]]  (Abnormal) Collected: 10/04/23 1953    Specimen: Blood from Arm, Right Updated: 10/04/23 2026     HS Troponin T 31 ng/L     Narrative:      High Sensitive Troponin T Reference Range:  <10.0 ng/L- Negative Female for AMI  <15.0 ng/L- Negative Male for AMI  >=10 - Abnormal Female indicating possible myocardial injury.  >=15 - Abnormal Male indicating possible myocardial injury.   Clinicians would have to utilize clinical acumen, EKG, Troponin, and serial changes to determine if it is an Acute Myocardial Infarction or myocardial injury due to an underlying chronic condition.                 Imaging Results (Last 24 Hours)       Procedure Component Value Units Date/Time    XR Chest 1 View [[id number]] Collected: 10/04/23 2002     Updated: 10/04/23 2031    Narrative:      CHEST SINGLE VIEW     HISTORY: CHF, COPD. Follow-up exam     COMPARISON: CT chest 09/20/2023, AP chest 08/04/2023, PA and lateral  chest 04/098/2023     FINDINGS: High resolution chest CT 09/20/2023 demonstrated right middle  lobe bronchiectasis with partial collapse of the right middle lobe and   this is without evidence for change. There are chronic appearing  increased interstitial markings and there is subtle hazy increased  opacity within the left midlung and right base potentially due to mild  superimposed infiltrate. It would be difficult to exclude asymmetric  areas of pulmonary edema. Cardiomediastinal silhouette is not changed.  Aortic vascular calcifications and cardiac monitor and leads  are  present.       Impression:      Chronic appearing increased interstitial markings with  subtle hazy increased opacity within the left midlung and right base  potentially due to superimposed infiltrate. Asymmetric areas of  pulmonary edema are also a consideration. The heart size is enlarged,  similar to the previous exam.     This report was finalized on 10/4/2023 8:28 PM by Dr. Ced Bonilla M.D on Workstation: [id number]               Results for orders placed during the hospital encounter of 07/09/22    Adult Transthoracic Echo Complete W/ Cont if Necessary Per Protocol    Interpretation Summary  ú Calculated left ventricular EF = 55.2% Estimated left ventricular EF was in agreement with the calculated left ventricular EF. Left ventricular systolic function is normal.  ú Left ventricular wall thickness is consistent with moderate concentric hypertrophy.  ú Left atrial volume is moderately increased.  ú The right atrial cavity is severely dilated.  ú Moderate mitral valve regurgitation is present with an eccentric jet noted.  ú Moderate tricuspid valve regurgitation is present.  ú Estimated right ventricular systolic pressure from tricuspid regurgitation is normal (<35 mmHg). Calculated right ventricular systolic pressure from tricuspid regurgitation is 31 mmHg.      ECG 12 Lead Dyspnea   Preliminary Result   HEART RATE= 83  bpm   RR Interval= 723  ms   CA Interval=   ms   P Horizontal Axis=   deg   P Front Axis=   deg   QRSD Interval= 82  ms   QT Interval= 326  ms   QTcB= 383  ms   QRS Axis= -3  deg   T Wave Axis= -57  deg   - ABNORMAL ECG -   Atrial fibrillation   Abnormal R-wave progression, early transition   Borderline repol abnormality, diffuse leads   Baseline wander in lead(s) III,V2   Electronically Signed By:    Date and Time of Study: 2023-10-04 19:37:43           Assessment/Plan     Active Hospital Problems    Diagnosis  POA    **Acute exacerbation of CHF (congestive heart failure) [I50.9]  Yes     Paroxysmal atrial fibrillation [I48.0]  Yes    Primary hypertension [I10]  Yes    Chronic coronary artery disease [I25.10]  Yes      Resolved Hospital Problems   No resolved problems to display.     Acute exacerbation of CHF (congestive heart failure)  Continue IV lasix   Monitor electrolytes and replace as needed  potassium 3.4 at time   As needed nitro  Continue IV furosemide for now   Check Respiratory PCR  Follows with Dr Robbins cardiology   Daily weights   Monitor I&O's   Continuous cardiac monitoring and pulse oximetry   Supplemental oxygen as needed to maintain oxygen saturation greater than 90%.    Acute on chronic respiratory failure  Asthma  Patient normally on 2 L of oxygen at bedtime now requiring 3 L at all time  Recent bronchoscopy reviewed -cultures so far are noninfectious   History of Aspergillus,bronchiectasis and mycobacterium   On chronic Azithromycin   TID percussion-vest at home  As needed duonebs as needed for shortness of breath and wheezing   Continue home regimen pending home med rec reconciliation  Encourage pulmonary hygiene    Proximal atrial fibrillation  Chronic  AC Eliquis- last dose this evening   RC metoprolol    Primary hypertension  Chronic   Home metoprolol  Vital signs per policy     Iron deficiency anemia  Chronic  Hemoglobin 1.6, hematocrit 29  Continue home supplement  Monitor CBC and transfuse as clinically appropriate  Check anemia profile last profile 2019      I discussed the patient's findings and my recommendations with patient and family.    VTE Prophylaxis - Eliquis (home med).  Code Status - DNR.       PJ Garcia  [facility]  10/04/23  21:13 EDT

## 2023-10-05 NOTE — CONSULTS
Patient Identification:  Agnieszka Rizzo  89 y.o.  female  8/7/1930  [id number]          LOS 0    Requesting physician:   Skinny Serna    Reason for Consultation:    Shortness of breath, hypoxic respiratory failure    History of Present Illness:   89-year-old female with heart failure with preserved ejection fraction, atrial fibrillation on Eliquis, bronchiectasis, coronary artery disease, history of aspergillus, on chronic azithromycin, chronic hypoxic respiratory failure (2 L nasal cannula) who presents for worsening shortness of breath.    Patient follows with Dr. oRgelio Tucker in clinic for bronchiectasis and chronic hypoxic respiratory failure.  She underwent bronchoscopy 2 days prior with bronchioloalveolar lavage performed in the lingula to evaluate for infection specifically KINA in the setting of her bronchiectasis.  Is on chronic azithromycin.  States that after the procedure she continued to have shortness of breath and significant dyspnea on exertion which did not improve so she presented to the hospital for further evaluation.  States that she is normally able to ambulate approximately a mile at a slow pace however now is getting dyspneic even with ambulation of just a few feet.  Denies any orthopnea.  Denies any worsening or lower extremity edema.  Does attest to compliance with her diuretic.  No fevers or chills.  Continues to have a chronic cough which is unchanged in nature.    Past Medical History:  Past Medical History:   Diagnosis Date    Aspergillus     Asthma     Atrial fibrillation     chronic    Atrial flutter     Bronchiectasis     C. difficile diarrhea 03/11/2017    CAD (coronary artery disease)     nonobstructive    Chronic diastolic CHF (congestive heart failure)     Colitis     Cough     Cryoglobulinemia     Dyspnea on exertion     Fall     Hyperlipidemia     Hypertension     Hyponatremia     Hypoxia     Infectious viral hepatitis     AGE 13    Left shoulder pain     Leg swelling      Lesion of lung     Malignant hyperthermia due to anesthesia     Mild tricuspid regurgitation     MR (mitral regurgitation)     mild    MVP (mitral valve prolapse)     Permanent atrial fibrillation     Pneumonia with the fungal infection aspergillosis 01/06/2017    Pneumothorax     SOB (shortness of breath)     UTI (urinary tract infection)     Wheeze     mild       Past Surgical History:  Past Surgical History:   Procedure Laterality Date    BRONCHOSCOPY N/A 11/12/2016    Procedure: BRONCHOSCOPY WITH FLUORO, BRUSHINGS, BAL, AND BIOPSIES;  Surgeon: Rogelio Tucker MD;  Location: [facility] ENDOSCOPY;  Service:     BRONCHOSCOPY Bilateral 06/03/2017    Procedure: BRONCHOSCOPY with BAL ;  Surgeon: Sung King MD;  Location: [facility] ENDOSCOPY;  Service:     BRONCHOSCOPY N/A 12/17/2019    Procedure: BRONCHOSCOPY WITH WASHINGS;  Surgeon: Rogelio Tucker MD;  Location: [facility] ENDOSCOPY;  Service: Pulmonary    BRONCHOSCOPY N/A 07/15/2022    Procedure: BRONCHOSCOPY;  Surgeon: Rogelio Tucker MD;  Location: [facility] ENDOSCOPY;  Service: Pulmonary;  Laterality: N/A;  Pre: Pneumonia  Post: Pneumonia    BRONCHOSCOPY N/A 10/2/2023    Procedure: BRONCHOSCOPY WITH BRONCHIAL AVEOLAR LAVAGE;  Surgeon: Rogelio Tucker MD;  Location: [facility] ENDOSCOPY;  Service: Pulmonary;  Laterality: N/A;  PRE:BRONCHIECTASIS /   POST: SAME    CATARACT EXTRACTION EXTRACAPSULAR W/ INTRAOCULAR LENS IMPLANTATION      COLONOSCOPY      2013    D & C WITH SUCTION      HYSTERECTOMY      KNEE ARTHROSCOPY Left     Partial        Home Meds:  Medications Prior to Admission   Medication Sig Dispense Refill Last Dose    acetaminophen (TYLENOL) 325 MG tablet Take 2 tablets by mouth Every 4 (Four) Hours As Needed for Moderate Pain.   10/4/2023    albuterol sulfate  (90 Base) MCG/ACT inhaler Inhale 2 puffs Every 6 (Six) Hours As Needed for Wheezing or Shortness of Air. 8 g 11 10/3/2023    apixaban (ELIQUIS) 2.5 MG tablet tablet Take 1 tablet by mouth Every 12 (Twelve) Hours.  Indications: Other - full anticoagulation 180 tablet 3 10/4/2023    benzonatate (TESSALON) 200 MG capsule Take 1 capsule by mouth 3 (Three) Times a Day As Needed. Indications: Cough   10/4/2023    cholecalciferol (VITAMIN D3) 25 MCG (1000 UT) tablet Take 1 tablet by mouth Daily.   10/4/2023    citalopram (CeleXA) 10 MG tablet Take 1 tablet by mouth Daily. 90 tablet 2 10/4/2023    FeroSul 325 (65 Fe) MG tablet TAKE ONE TABLET BY MOUTH DAILY WITH BREAKFAST (Patient taking differently: Take 1 tablet by mouth Daily.) 90 tablet 0 10/4/2023    fexofenadine (ALLEGRA) 180 MG tablet Take 1 tablet by mouth Daily.   10/4/2023    fluticasone (FLONASE) 50 MCG/ACT nasal spray 2 sprays into the nostril(s) as directed by provider At Night As Needed for Allergies.   10/4/2023    fluticasone-salmeterol (ADVAIR HFA) 115-21 MCG/ACT inhaler Inhale 2 puffs 2 (Two) Times a Day.   10/4/2023    guaiFENesin (MUCINEX) 600 MG 12 hr tablet Take 1 tablet by mouth 2 (Two) Times a Day.   10/4/2023    Lactobacillus (FLORAJEN ACIDOPHILUS) capsule Take 1 tablet by mouth Daily.   10/4/2023    metoprolol tartrate (LOPRESSOR) 100 MG tablet Take 1 tablet by mouth Every Morning for 360 days. 90 tablet 3 10/4/2023    Multiple Vitamins-Minerals (PRESERVISION AREDS PO) Take 1 tablet by mouth 2 (Two) Times a Day.   10/4/2023    potassium chloride (K-DUR,KLOR-CON) 20 MEQ CR tablet Take 1 tablet by mouth Daily. 90 tablet 3 10/4/2023    torsemide (DEMADEX) 20 MG tablet Take 2 tablets by mouth 2 (Two) Times a Day for 360 days. 360 tablet 3 10/4/2023    azithromycin (ZITHROMAX) 250 MG tablet Take 1 tablet by mouth Daily.   Unknown    ipratropium-albuterol (DUO-NEB) 0.5-2.5 mg/3 ml nebulizer Take 3 mL by nebulization 2 (Two) Times a Day.   Unknown    sodium chloride 0.9 % nebulizer solution Take 3 mL by nebulization 2 (Two) Times a Day.   Unknown         Allergies:  Allergies   Allergen Reactions    Amlodipine Besylate Swelling    Aspirin GI Intolerance     Caused  "bleeding ulcers    Bactrim [Sulfamethoxazole-Trimethoprim] Nausea And Vomiting    Erythromycin Unknown (See Comments)     Patient does not recall type of reaction.    Levaquin [Levofloxacin] Unknown (See Comments)     Nausea      Nitrofurantoin Nausea Only and Nausea And Vomiting    Ramipril Other (See Comments)     Cough       Social History:   Social History     Socioeconomic History    Marital status:    Tobacco Use    Smoking status: Never    Smokeless tobacco: Never    Tobacco comments:     caffiene daily   Vaping Use    Vaping Use: Never used   Substance and Sexual Activity    Alcohol use: Not Currently     Alcohol/week: 2.0 standard drinks     Types: 1 Glasses of wine, 1 Shots of liquor per week    Drug use: No    Sexual activity: Defer     Partners: Male       Family History:  Family History   Problem Relation Age of Onset    Hypertension Mother     Stroke Mother     Heart disease Father     Hypertension Father     Cancer Brother     Cancer Son        Review of Systems:  Denies fevers or chills  Denies nausea or vomiting  No new vision or hearing changes  No chest pain  Positive shortness of breath and cough  No diarrhea, hematemesis or hematochezia, no dysuria or frequency  No musculoskeletal complaints  No heat or cold intolerance  No skin rashes  No dizziness or confusion.  No seizure activity  No new anxiety or depression  12 system review of systems performed and all else negative    Objective:    PHYSICAL EXAM:    /74 (BP Location: Right arm, Patient Position: Lying)   Pulse 109   Temp 98.2 øF (36.8 øC) (Oral)   Resp 20   Ht 160 cm (63\")   Wt 56.5 kg (124 lb 8 oz)   SpO2 95%   BMI 22.05 kg/mý  Body mass index is 22.05 kg/mý. 95% 56.5 kg (124 lb 8 oz)    General: Alert, nontoxic, NAD, elderly  HEENT: NC/AT, EOMI, MMM  Neck: Supple, trachea midline  Cardiac: RRR, no murmur, gallops, rubs  Pulmonary: Mild crackles at lung bases  GI: Soft, non-tender, non-distended, normal bowel " sounds  Extremities: Warm, well perfused, trace LE edema  Skin: no visible rash  Neuro: CN II - XII grossly intact  Psychiatry: Normal mood and affect    Lab Review:   Results from last 7 days   Lab Units 10/05/23  0352 10/04/23  1919   WBC 10*3/mm3 12.21* 13.91*   HEMOGLOBIN g/dL 10.0* 9.6*   PLATELETS 10*3/mm3 265 278     Results from last 7 days   Lab Units 10/05/23  0352 10/04/23  1953   SODIUM mmol/L 144 140   POTASSIUM mmol/L 3.1* 3.4*   CHLORIDE mmol/L 102 99   CO2 mmol/L 31.5* 27.0   BUN mg/dL 24* 31*   CREATININE mg/dL 1.01* 1.14*   GLUCOSE mg/dL 121* 135*   CALCIUM mg/dL 9.5 9.3   Estimated Creatinine Clearance: 31 mL/min (A) (by C-G formula based on SCr of 1.01 mg/dL (H)).    Results from last 7 days   Lab Units 10/05/23  0352 10/04/23  1953 10/04/23  1919   AST (SGOT) U/L  --  21  --    ALT (SGPT) U/L  --  13  --    PROCALCITONIN ng/mL 0.38*  --   --    PLATELETS 10*3/mm3 265  --  278            Imaging reviewed  Chest x-ray from 10/4 reviewed.  CT chest from 9/20 reviewed.     Assessment / Recommendations:    Acute on chronic hypoxic respiratory failure  2 L nasal cannula at night  Acute exacerbation of heart failure with preserved ejection fraction  Atrial fibrillation on Eliquis-in RVR  Bronchiectasis on chronic azithromycin  History of Aspergillus  Coronary disease  Leukocytosis    - Patient with markedly elevated BNP and pulmonary edema on chest x-ray.  Recommend diuresis to assist with hypoxic respiratory failure.  -CT chest pending, anticipate that there will be an infiltrate in the left lingula where she had bronchioloalveolar lavage performed.  We will look for any other consolidations or concerns for infection.  -Follow-up infectious work-up  -Continue chronic azithromycin  -Continue airway clearance with nebulizers and saline    Thank you for allowing me to participate in the care of this patient.  I will continue to follow along with you.    Sebastian Rahman MD  [facility],  [facility]  Pulmonary and Critical Care Medicine, Interventional Pulmonology    10/5/2023  09:39 EDT    Parts of this note may be an electronic transcription/translation of spoken language to printed text using the Dragon dictation system.

## 2023-10-05 NOTE — CASE MANAGEMENT/SOCIAL WORK
Discharge Planning Assessment  [facility]     Patient Name: Agnieszka Rizzo  MRN: [id number]  Today's Date: 10/5/2023    Admit Date: 10/4/2023    Plan: Home, family will transport   Discharge Needs Assessment       Row Name 10/05/23 1328       Living Environment    People in Home alone    Current Living Arrangements home    Potentially Unsafe Housing Conditions none    Primary Care Provided by self    Provides Primary Care For no one    Family Caregiver if Needed child(graciela), adult    Family Caregiver Names States her adult children work a lot and cannot assist with any care needs    Quality of Family Relationships unable to assess    Able to Return to Prior Arrangements yes       Resource/Environmental Concerns    Resource/Environmental Concerns none    Transportation Concerns none       Food Insecurity    Within the past 12 months, you worried that your food would run out before you got the money to buy more. Never true    Within the past 12 months, the food you bought just didn't last and you didn't have money to get more. Never true       Transition Planning    Patient/Family Anticipates Transition to home    Patient/Family Anticipated Services at Transition none       Discharge Needs Assessment    Equipment Currently Used at Home cane, straight;walker, rolling  Pt has available, but does not use    Concerns to be Addressed no discharge needs identified;denies needs/concerns at this time    Anticipated Changes Related to Illness none    Equipment Needed After Discharge none                   Discharge Plan       Row Name 10/05/23 5014       Plan    Plan Home, family will transport    Provided Post Acute Provider List? N/A    Provided Post Acute Provider Quality & Resource List? N/A    Plan Comments Met with pt at bedside. Introduced self and explained role of . Face sheet  verified, PCP is Matt Rose. Pt denies any difficulty paying for medications and she obtains her medications from  [email or web address]. Pt stated that her children work a lot and is not able to provide any assistance with care needs, and her normal care giver is out of town. Private Duty Sitter list given to pt per pt request. Pt stated that she is normally independent in ADL's and does not use any assistive devices.  She has available a cane/walker if needed for mobility, if needed. Pt has used [facility] in the past and has been to [place] in the past. Pt does not anticipate requiring those services upon discharge. Pt stated that one of her children will transport home. Explained that CCP would follow to assess for discharge needs.                  Continued Care and Services - Admitted Since 10/4/2023    Coordination has not been started for this encounter.       Expected Discharge Date and Time       Expected Discharge Date Expected Discharge Time    Oct 9, 2023            Demographic Summary    No documentation.                  Functional Status    No documentation.                  Psychosocial    No documentation.                  Abuse/Neglect    No documentation.                  Legal    No documentation.                  Substance Abuse    No documentation.                  Patient Forms    No documentation.                     Elisha Leal RN

## 2023-10-05 NOTE — ED NOTES
Nursing report ED to floor  Agnieszka Rizzo  89 y.o.  female    HPI :   Chief Complaint   Patient presents with    Shortness of Breath       Admitting doctor:   Willam Villar MD    Admitting diagnosis:   The encounter diagnosis was Acute on chronic diastolic congestive heart failure.    Code status:   Current Code Status       Date Active Code Status Order ID Comments User Context       10/4/2023 2112 No CPR (Do Not Attempt to Resuscitate) [id number]  Kathi Gonzáles APRN ED        Question Answer    Code Status (Patient has no pulse and is not breathing) No CPR (Do Not Attempt to Resuscitate)    Medical Interventions (Patient has pulse or is breathing) Limited Support    Medical Intervention Limits: NO intubation (DNI)     NO antiarrhythmic drugs                    Allergies:   Amlodipine besylate, Aspirin, Bactrim [sulfamethoxazole-trimethoprim], Erythromycin, Levaquin [levofloxacin], Nitrofurantoin, and Ramipril    Isolation:   No active isolations    Intake and Output  No intake or output data in the 24 hours ending 10/04/23 2122    Weight:       10/04/23  1908   Weight: 55.8 kg (123 lb)       Most recent vitals:   Vitals:    10/04/23 2000 10/04/23 2030 10/04/23 2100 10/04/23 2118   BP: 118/72 117/66 125/74    BP Location:       Patient Position:       Pulse: 81 92 97    Resp: 20 20 20 18   Temp:       TempSrc:       SpO2: 96% 94% 96%    Weight:       Height:           Active LDAs/IV Access:   Lines, Drains & Airways       Active LDAs       Name Placement date Placement time Site Days    Peripheral IV 10/04/23 Left Antecubital 10/04/23  --  Antecubital  less than 1    Peripheral IV 10/04/23 Right;Posterior Forearm 10/04/23  --  Forearm  less than 1                    Labs (abnormal labs have a star):   Labs Reviewed   COMPREHENSIVE METABOLIC PANEL - Abnormal; Notable for the following components:       Result Value    Glucose 135 (*)     BUN 31 (*)     Creatinine 1.14 (*)     Potassium 3.4 (*)      BUN/Creatinine Ratio 27.2 (*)     eGFR 45.0 (*)     All other components within normal limits    Narrative:     GFR Normal >60  Chronic Kidney Disease <60  Kidney Failure <15    The GFR formula is only valid for adults with stable renal function between ages 18 and 70.   BNP (IN-HOUSE) - Abnormal; Notable for the following components:    proBNP 8,193.0 (*)     All other components within normal limits    Narrative:     This assay is used as an aid in the diagnosis of individuals suspected of having heart failure. It can be used as an aid in the diagnosis of acute decompensated heart failure (ADHF) in patients presenting with signs and symptoms of ADHF to the emergency department (ED). In addition, NT-proBNP of <300 pg/mL indicates ADHF is not likely.    Age Range Result Interpretation  NT-proBNP Concentration (pg/mL:      <50             Positive            >450                   Gray                 300-450                    Negative             <300    50-75           Positive            >900                  Gray                300-900                  Negative            <300      >75             Positive            >1800                  Gray                300-1800                  Negative            <300   SINGLE HSTROPONIN T - Abnormal; Notable for the following components:    HS Troponin T 31 (*)     All other components within normal limits    Narrative:     High Sensitive Troponin T Reference Range:  <10.0 ng/L- Negative Female for AMI  <15.0 ng/L- Negative Male for AMI  >=10 - Abnormal Female indicating possible myocardial injury.  >=15 - Abnormal Male indicating possible myocardial injury.   Clinicians would have to utilize clinical acumen, EKG, Troponin, and serial changes to determine if it is an Acute Myocardial Infarction or myocardial injury due to an underlying chronic condition.        CBC WITH AUTO DIFFERENTIAL - Abnormal; Notable for the following components:    WBC 13.91 (*)     RBC 2.99 (*)      Hemoglobin 9.6 (*)     Hematocrit 29.0 (*)     Neutrophil % 77.5 (*)     Lymphocyte % 10.6 (*)     Immature Grans % 0.7 (*)     Neutrophils, Absolute 10.79 (*)     Monocytes, Absolute 1.40 (*)     Immature Grans, Absolute 0.10 (*)     All other components within normal limits   RAINBOW DRAW    Narrative:     The following orders were created for panel order [place] Draw.  Procedure                               Abnormality         Status                     ---------                               -----------         ------                     Green Top (Gel)[[id number]]                                  Final result               Lavender Top[[id number]]                                     Final result               Gold Top - SST[[id number]]                                   Final result               Light Blue Top[[id number]]                                   Final result                 Please view results for these tests on the individual orders.   URINALYSIS W/ MICROSCOPIC IF INDICATED (NO CULTURE)   CBC AND DIFFERENTIAL    Narrative:     The following orders were created for panel order CBC & Differential.  Procedure                               Abnormality         Status                     ---------                               -----------         ------                     CBC Auto Differential[[id number]]        Abnormal            Final result                 Please view results for these tests on the individual orders.   GREEN TOP   LAVENDER TOP   GOLD TOP - SST   LIGHT BLUE TOP       EKG:   ECG 12 Lead Dyspnea   Preliminary Result   HEART RATE= 83  bpm   RR Interval= 723  ms   AL Interval=   ms   P Horizontal Axis=   deg   P Front Axis=   deg   QRSD Interval= 82  ms   QT Interval= 326  ms   QTcB= 383  ms   QRS Axis= -3  deg   T Wave Axis= -57  deg   - ABNORMAL ECG -   Atrial fibrillation   Abnormal R-wave progression, early transition   Borderline repol abnormality, diffuse leads   Baseline wander in  lead(s) III,V2   Electronically Signed By:    Date and Time of Study: 2023-10-04 19:37:43          Meds given in ED:   Medications   sodium chloride 0.9 % flush 10 mL (has no administration in time range)   sodium chloride 0.9 % flush 10 mL (has no administration in time range)   sodium chloride 0.9 % flush 10 mL (has no administration in time range)   sodium chloride 0.9 % infusion 40 mL (has no administration in time range)   sennosides-docusate (PERICOLACE) 8.6-50 MG per tablet 2 tablet (has no administration in time range)     And   polyethylene glycol (MIRALAX) packet 17 g (has no administration in time range)     And   bisacodyl (DULCOLAX) EC tablet 5 mg (has no administration in time range)     And   bisacodyl (DULCOLAX) suppository 10 mg (has no administration in time range)   nitroglycerin (NITROSTAT) SL tablet 0.4 mg (has no administration in time range)   acetaminophen (TYLENOL) tablet 650 mg (has no administration in time range)     Or   acetaminophen (TYLENOL) 160 MG/5ML oral solution 650 mg (has no administration in time range)     Or   acetaminophen (TYLENOL) suppository 650 mg (has no administration in time range)   furosemide (LASIX) injection 40 mg (40 mg Intravenous Given 10/4/23 2052)       Imaging results:  XR Chest 1 View    Result Date: 10/4/2023  Chronic appearing increased interstitial markings with subtle hazy increased opacity within the left midlung and right base potentially due to superimposed infiltrate. Asymmetric areas of pulmonary edema are also a consideration. The heart size is enlarged, similar to the previous exam.  This report was finalized on 10/4/2023 8:28 PM by Dr. Ced Bonilla M.D on Workstation: [id number]       Ambulatory status:   - Stand-by assist; needs oxygen at this time    Social issues:   Social History     Socioeconomic History    Marital status:    Tobacco Use    Smoking status: Never    Smokeless tobacco: Never    Tobacco comments:     caffiene daily    Vaping Use    Vaping Use: Never used   Substance and Sexual Activity    Alcohol use: Not Currently     Alcohol/week: 2.0 standard drinks     Types: 1 Glasses of wine, 1 Shots of liquor per week    Drug use: No    Sexual activity: Defer     Partners: Male       NIH Stroke Scale:       Tonja Mata RN  10/04/23 21:22 EDT

## 2023-10-05 NOTE — CONSULTS
Date of Consultation: 10/05/23    Referral Provider: Willam Villar MD     Reason for Consultation: Shortness of breath, CHF.    Encounter Provider: Naomi Grant RN    Group of Service: [facility]     Patient Name: Agnieszka Rizzo    :1930    Chief complaint:  Shortness of Breath    History of Present Illness:      Agnieszka Rizzo is a 89 year-old female who follows with Dr. Robbins in the office. She has a past medical history significant for persistent atrial fibrillation (chronically anticoagulated with Eliquis), diastolic heart failure, hypertension, and CAD.    She was hospitalized recently with dyspnea. She was found  to have acute on chronic diastolic heart failure. She ws treated with IV diuretics and symptoms quickly improved. Her home diuretic regimen was increased and she was subsequently discharged on 2023.    She presented to the ED on 10/4/23 with complaints of increased shortness of breath. This has gotten progressively worse since her bronchoscopy with Dr. Tucker on 10/2/23. She also reported that her cough and her orthopnea is worse. She typically wears 2L NC at bedtime, however, now she is required to wear 3L constantly due to her shortness of breath. She was given 40mg IV lasix in the ED. she is currently on Lasix 40 mg IV 3 times a day with some improvement in her symptoms, although she is still short of breath.  She remains in largely controlled atrial fibrillation.    ECHO 22  Calculated left ventricular EF = 55.2% Estimated left ventricular EF was in agreement with the calculated left ventricular EF. Left ventricular systolic function is normal.  Left ventricular wall thickness is consistent with moderate concentric hypertrophy.  Left atrial volume is moderately increased.  The right atrial cavity is severely dilated.  Moderate mitral valve regurgitation is present with an eccentric jet noted.  Moderate tricuspid valve regurgitation is present.  Estimated right  ventricular systolic pressure from tricuspid regurgitation is normal (<35 mmHg). Calculated right ventricular systolic pressure from tricuspid regurgitation is 31 mmHg.    Stress Test 12/20/18  Left ventricular ejection fraction is normal (Calculated EF = 68%).  Myocardial perfusion imaging indicates a normal myocardial perfusion study with no evidence of ischemia.  Impressions are consistent with a low risk study.    Past Medical History:   Diagnosis Date    Aspergillus     Asthma     Atrial fibrillation     chronic    Atrial flutter     Bronchiectasis     C. difficile diarrhea 03/11/2017    CAD (coronary artery disease)     nonobstructive    Chronic diastolic CHF (congestive heart failure)     Colitis     Cough     Cryoglobulinemia     Dyspnea on exertion     Fall     Hyperlipidemia     Hypertension     Hyponatremia     Hypoxia     Infectious viral hepatitis     AGE 13    Left shoulder pain     Leg swelling     Lesion of lung     Malignant hyperthermia due to anesthesia     Mild tricuspid regurgitation     MR (mitral regurgitation)     mild    MVP (mitral valve prolapse)     Permanent atrial fibrillation     Pneumonia with the fungal infection aspergillosis 01/06/2017    Pneumothorax     SOB (shortness of breath)     UTI (urinary tract infection)     Wheeze     mild         Past Surgical History:   Procedure Laterality Date    BRONCHOSCOPY N/A 11/12/2016    Procedure: BRONCHOSCOPY WITH FLUORO, BRUSHINGS, BAL, AND BIOPSIES;  Surgeon: Rogelio Tucker MD;  Location: [facility] ENDOSCOPY;  Service:     BRONCHOSCOPY Bilateral 06/03/2017    Procedure: BRONCHOSCOPY with BAL ;  Surgeon: Sung King MD;  Location: [facility] ENDOSCOPY;  Service:     BRONCHOSCOPY N/A 12/17/2019    Procedure: BRONCHOSCOPY WITH WASHINGS;  Surgeon: Rogelio Tucker MD;  Location: [facility] ENDOSCOPY;  Service: Pulmonary    BRONCHOSCOPY N/A 07/15/2022    Procedure: BRONCHOSCOPY;  Surgeon: Rogelio Tucker MD;  Location: [facility] ENDOSCOPY;  Service: Pulmonary;   Laterality: N/A;  Pre: Pneumonia  Post: Pneumonia    BRONCHOSCOPY N/A 10/2/2023    Procedure: BRONCHOSCOPY WITH BRONCHIAL AVEOLAR LAVAGE;  Surgeon: Rogelio Tucker MD;  Location: [facility] ENDOSCOPY;  Service: Pulmonary;  Laterality: N/A;  PRE:BRONCHIECTASIS /   POST: SAME    CATARACT EXTRACTION EXTRACAPSULAR W/ INTRAOCULAR LENS IMPLANTATION      COLONOSCOPY      2013    D & C WITH SUCTION      HYSTERECTOMY      KNEE ARTHROSCOPY Left     Partial         Allergies   Allergen Reactions    Amlodipine Besylate Swelling    Aspirin GI Intolerance     Caused bleeding ulcers    Bactrim [Sulfamethoxazole-Trimethoprim] Nausea And Vomiting    Erythromycin Unknown (See Comments)     Patient does not recall type of reaction.    Levaquin [Levofloxacin] Unknown (See Comments)     Nausea      Nitrofurantoin Nausea Only and Nausea And Vomiting    Ramipril Other (See Comments)     Cough         No current facility-administered medications on file prior to encounter.     Current Outpatient Medications on File Prior to Encounter   Medication Sig Dispense Refill    acetaminophen (TYLENOL) 325 MG tablet Take 2 tablets by mouth Every 4 (Four) Hours As Needed for Moderate Pain.      albuterol sulfate  (90 Base) MCG/ACT inhaler Inhale 2 puffs Every 6 (Six) Hours As Needed for Wheezing or Shortness of Air. 8 g 11    apixaban (ELIQUIS) 2.5 MG tablet tablet Take 1 tablet by mouth Every 12 (Twelve) Hours. Indications: Other - full anticoagulation 180 tablet 3    benzonatate (TESSALON) 200 MG capsule Take 1 capsule by mouth 3 (Three) Times a Day As Needed. Indications: Cough      cholecalciferol (VITAMIN D3) 25 MCG (1000 UT) tablet Take 1 tablet by mouth Daily.      citalopram (CeleXA) 10 MG tablet Take 1 tablet by mouth Daily. 90 tablet 2    FeroSul 325 (65 Fe) MG tablet TAKE ONE TABLET BY MOUTH DAILY WITH BREAKFAST (Patient taking differently: Take 1 tablet by mouth Daily.) 90 tablet 0    fexofenadine (ALLEGRA) 180 MG tablet Take 1 tablet by  mouth Daily.      fluticasone (FLONASE) 50 MCG/ACT nasal spray 2 sprays into the nostril(s) as directed by provider At Night As Needed for Allergies.      fluticasone-salmeterol (ADVAIR HFA) 115-21 MCG/ACT inhaler Inhale 2 puffs 2 (Two) Times a Day.      guaiFENesin (MUCINEX) 600 MG 12 hr tablet Take 1 tablet by mouth 2 (Two) Times a Day.      Lactobacillus (FLORAJEN ACIDOPHILUS) capsule Take 1 tablet by mouth Daily.      metoprolol tartrate (LOPRESSOR) 100 MG tablet Take 1 tablet by mouth Every Morning for 360 days. 90 tablet 3    Multiple Vitamins-Minerals (PRESERVISION AREDS PO) Take 1 tablet by mouth 2 (Two) Times a Day.      potassium chloride (K-DUR,KLOR-CON) 20 MEQ CR tablet Take 1 tablet by mouth Daily. 90 tablet 3    torsemide (DEMADEX) 20 MG tablet Take 2 tablets by mouth 2 (Two) Times a Day for 360 days. 360 tablet 3    [DISCONTINUED] metoprolol tartrate (LOPRESSOR) 50 MG tablet Take 1 tablet by mouth Every Night for 360 days. 90 tablet 3    azithromycin (ZITHROMAX) 250 MG tablet Take 1 tablet by mouth Daily.      ipratropium-albuterol (DUO-NEB) 0.5-2.5 mg/3 ml nebulizer Take 3 mL by nebulization 2 (Two) Times a Day.      sodium chloride 0.9 % nebulizer solution Take 3 mL by nebulization 2 (Two) Times a Day.           Social History     Socioeconomic History    Marital status:    Tobacco Use    Smoking status: Never    Smokeless tobacco: Never    Tobacco comments:     caffiene daily   Vaping Use    Vaping Use: Never used   Substance and Sexual Activity    Alcohol use: Not Currently     Alcohol/week: 2.0 standard drinks     Types: 1 Glasses of wine, 1 Shots of liquor per week    Drug use: No    Sexual activity: Defer     Partners: Male         Family History   Problem Relation Age of Onset    Hypertension Mother     Stroke Mother     Heart disease Father     Hypertension Father     Cancer Brother     Cancer Son        REVIEW OF SYSTEMS:   Pertinent positives are noted in the HPI above.  Otherwise,  "all other systems were reviewed, and are negative.     Objective:     Vitals:    10/04/23 2130 10/04/23 2256 10/05/23 0050 10/05/23 0535   BP: 127/96 115/69     BP Location:  Right arm     Patient Position:  Lying     Pulse: 104 94 81    Resp: 18 18     Temp:  98.4 øF (36.9 øC)     TempSrc:  Oral     SpO2: 91% 97% 97%    Weight:  58.1 kg (128 lb)  56.5 kg (124 lb 8 oz)   Height:         Body mass index is 22.05 kg/mý.  Flowsheet Rows      Flowsheet Row First Filed Value   Admission Height 160 cm (63\") Documented at 10/04/2023 1908   Admission Weight 55.8 kg (123 lb) Documented at 10/04/2023 1908             General:    No acute distress, alert and oriented x4, pleasant                   Head:    Normocephalic, atraumatic.   Eyes:          Conjunctivae and sclerae normal, no icterus.   Throat:   No oral lesions, no thrush, oral mucosa moist.    Neck:   Supple, trachea midline.   Lungs:     Rales at the bases bilaterally.    Heart:    Irregularly irregular rhythm with normal rate. II/VI SM throughout.   Abdomen:     Soft, non-tender, non-distended, positive bowel sounds.    Extremities:   Trace edema   Pulses:   Pulses palpable and equal bilaterally.    Skin:   No bleeding or rash.   Neuro:   Non-focal.  Moves all extremities well.    Psychiatric:   Normal mood and affect.                 Lab Review:                Results from last 7 days   Lab Units 10/05/23  0352   SODIUM mmol/L 144   POTASSIUM mmol/L 3.1*   CHLORIDE mmol/L 102   CO2 mmol/L 31.5*   BUN mg/dL 24*   CREATININE mg/dL 1.01*   GLUCOSE mg/dL 121*   CALCIUM mg/dL 9.5     Results from last 7 days   Lab Units 10/04/23  1953   HSTROP T ng/L 31*     Results from last 7 days   Lab Units 10/05/23  0352   WBC 10*3/mm3 12.21*   HEMOGLOBIN g/dL 10.0*   HEMATOCRIT % 30.0*   PLATELETS 10*3/mm3 265                       EKG (reviewed by me personally):    10/4/23        8/4/23      Assessment:   1.  Acute on chronic hypoxic respiratory failure  2.  Acute on chronic " diastolic CHF  3.  Mitral regurgitation, moderate in the past  4.  Persistent atrial fibrillation  5.  Bronchiectasis, on chronic azithromycin  6.  Coronary artery disease  7.  Hypertension  8.  Multifactorial anemia    Plan:       Pulmonology is also following the patient, and a CT scan is pending.  From a congestive heart failure standpoint, she needs further diuresis, and I agree with continuing the Lasix at 40 mg IV every 8 hours through today.  An echocardiogram is pending.  I am interested to review her ejection fraction, but also to look at her mitral regurgitation as I am concerned this may be driving the congestive heart failure.  Heart rate is high at times, although for the most part it has been in the 90s.  I am going to give her a dose of IV digoxin 250 mcg 1 time.  I will keep her on the metoprolol at 100 mg/day and 50 mg at night otherwise.  Further plans are going to be made pending the echocardiogram results.    Thank you very much for this consult.    Vasiliy Jarquin MD

## 2023-10-06 ENCOUNTER — APPOINTMENT (OUTPATIENT)
Dept: CT IMAGING | Facility: HOSPITAL | Age: 88
DRG: 291 | End: 2023-10-06
Payer: MEDICARE

## 2023-10-06 ENCOUNTER — APPOINTMENT (OUTPATIENT)
Dept: CARDIOLOGY | Facility: HOSPITAL | Age: 88
DRG: 291 | End: 2023-10-06
Payer: MEDICARE

## 2023-10-06 LAB
ALBUMIN SERPL-MCNC: 3.6 G/DL (ref 3.5–5.2)
ALBUMIN/GLOB SERPL: 1.2 G/DL
ALP SERPL-CCNC: 106 U/L (ref 39–117)
ALT SERPL W P-5'-P-CCNC: 16 U/L (ref 1–33)
ANION GAP SERPL CALCULATED.3IONS-SCNC: 10.1 MMOL/L (ref 5–15)
AORTIC DIMENSIONLESS INDEX: 0.6 (DI)
AST SERPL-CCNC: 27 U/L (ref 1–32)
BH CV ECHO MEAS - ACS: 1.71 CM
BH CV ECHO MEAS - AO MAX PG: 4.9 MMHG
BH CV ECHO MEAS - AO MEAN PG: 3.2 MMHG
BH CV ECHO MEAS - AO ROOT DIAM: 2.5 CM
BH CV ECHO MEAS - AO V2 MAX: 111 CM/SEC
BH CV ECHO MEAS - AO V2 VTI: 20.9 CM
BH CV ECHO MEAS - AVA(I,D): 1.67 CM2
BH CV ECHO MEAS - EDV(CUBED): 60.1 ML
BH CV ECHO MEAS - EDV(MOD-SP2): 79 ML
BH CV ECHO MEAS - EDV(MOD-SP4): 74 ML
BH CV ECHO MEAS - EF(MOD-BP): 56 %
BH CV ECHO MEAS - EF(MOD-SP2): 54.4 %
BH CV ECHO MEAS - EF(MOD-SP4): 55.4 %
BH CV ECHO MEAS - ESV(CUBED): 22.2 ML
BH CV ECHO MEAS - ESV(MOD-SP2): 36 ML
BH CV ECHO MEAS - ESV(MOD-SP4): 33 ML
BH CV ECHO MEAS - FS: 28.2 %
BH CV ECHO MEAS - IVS/LVPW: 1.38 CM
BH CV ECHO MEAS - IVSD: 1.34 CM
BH CV ECHO MEAS - LA DIMENSION: 4.8 CM
BH CV ECHO MEAS - LV DIASTOLIC VOL/BSA (35-75): 47 CM2
BH CV ECHO MEAS - LV MASS(C)D: 151 GRAMS
BH CV ECHO MEAS - LV MAX PG: 1.96 MMHG
BH CV ECHO MEAS - LV MEAN PG: 1.03 MMHG
BH CV ECHO MEAS - LV SYSTOLIC VOL/BSA (12-30): 21 CM2
BH CV ECHO MEAS - LV V1 MAX: 70 CM/SEC
BH CV ECHO MEAS - LV V1 VTI: 13 CM
BH CV ECHO MEAS - LVIDD: 3.9 CM
BH CV ECHO MEAS - LVIDS: 2.8 CM
BH CV ECHO MEAS - LVOT AREA: 2.7 CM2
BH CV ECHO MEAS - LVOT DIAM: 1.85 CM
BH CV ECHO MEAS - LVPWD: 0.97 CM
BH CV ECHO MEAS - MR MAX PG: 96.4 MMHG
BH CV ECHO MEAS - MR MAX VEL: 490.8 CM/SEC
BH CV ECHO MEAS - MR MEAN PG: 58.3 MMHG
BH CV ECHO MEAS - MR MEAN VEL: 363.4 CM/SEC
BH CV ECHO MEAS - MR VTI: 144.9 CM
BH CV ECHO MEAS - MV DEC SLOPE: 757.6 CM/SEC2
BH CV ECHO MEAS - MV DEC TIME: 130 SEC
BH CV ECHO MEAS - MV E MAX VEL: 127 CM/SEC
BH CV ECHO MEAS - MV MAX PG: 7.6 MMHG
BH CV ECHO MEAS - MV MEAN PG: 2.7 MMHG
BH CV ECHO MEAS - MV P1/2T: 55 MSEC
BH CV ECHO MEAS - MV V2 VTI: 23.7 CM
BH CV ECHO MEAS - MVA(P1/2T): 4 CM2
BH CV ECHO MEAS - MVA(VTI): 1.48 CM2
BH CV ECHO MEAS - PA ACC TIME: 0.1 SEC
BH CV ECHO MEAS - PA V2 MAX: 71.9 CM/SEC
BH CV ECHO MEAS - QP/QS: 1.27
BH CV ECHO MEAS - RAP SYSTOLE: 3 MMHG
BH CV ECHO MEAS - RV MAX PG: 0.9 MMHG
BH CV ECHO MEAS - RV V1 MAX: 47.6 CM/SEC
BH CV ECHO MEAS - RV V1 VTI: 6.4 CM
BH CV ECHO MEAS - RVDD: 3.1 CM
BH CV ECHO MEAS - RVOT DIAM: 3 CM
BH CV ECHO MEAS - RVSP: 49 MMHG
BH CV ECHO MEAS - SI(MOD-SP2): 27.3 ML/M2
BH CV ECHO MEAS - SI(MOD-SP4): 26.1 ML/M2
BH CV ECHO MEAS - SV(LVOT): 35 ML
BH CV ECHO MEAS - SV(MOD-SP2): 43 ML
BH CV ECHO MEAS - SV(MOD-SP4): 41 ML
BH CV ECHO MEAS - SV(RVOT): 44.6 ML
BH CV ECHO MEAS - TAPSE (>1.6): 1.46 CM
BH CV ECHO MEAS - TR MAX PG: 46.1 MMHG
BH CV ECHO MEAS - TR MAX VEL: 339.3 CM/SEC
BH CV XLRA - RV BASE: 3.2 CM
BH CV XLRA - RV LENGTH: 5.9 CM
BH CV XLRA - RV MID: 2.01 CM
BILIRUB SERPL-MCNC: 0.7 MG/DL (ref 0–1.2)
BUN SERPL-MCNC: 18 MG/DL (ref 8–23)
BUN/CREAT SERPL: 19.1 (ref 7–25)
CALCIUM SPEC-SCNC: 9.7 MG/DL (ref 8.2–9.6)
CHLORIDE SERPL-SCNC: 102 MMOL/L (ref 98–107)
CLUMPED PLATELETS: PRESENT
CO2 SERPL-SCNC: 29.9 MMOL/L (ref 22–29)
CREAT SERPL-MCNC: 0.94 MG/DL (ref 0.57–1)
DEPRECATED RDW RBC AUTO: 48.8 FL (ref 37–54)
EGFRCR SERPLBLD CKD-EPI 2021: 56.7 ML/MIN/1.73
ERYTHROCYTE [DISTWIDTH] IN BLOOD BY AUTOMATED COUNT: 13.6 % (ref 12.3–15.4)
GLOBULIN UR ELPH-MCNC: 3.1 GM/DL
GLUCOSE SERPL-MCNC: 114 MG/DL (ref 65–99)
HCT VFR BLD AUTO: 31.4 % (ref 34–46.6)
HGB BLD-MCNC: 10.5 G/DL (ref 12–15.9)
LEFT ATRIUM VOLUME INDEX: 56.9 ML/M2
LYMPHOCYTES # BLD MANUAL: 2.26 10*3/MM3 (ref 0.7–3.1)
LYMPHOCYTES NFR BLD MANUAL: 13 % (ref 5–12)
MCH RBC QN AUTO: 32.7 PG (ref 26.6–33)
MCHC RBC AUTO-ENTMCNC: 33.4 G/DL (ref 31.5–35.7)
MCV RBC AUTO: 97.8 FL (ref 79–97)
MONOCYTES # BLD: 1.63 10*3/MM3 (ref 0.1–0.9)
NEUTROPHILS # BLD AUTO: 8.67 10*3/MM3 (ref 1.7–7)
NEUTROPHILS NFR BLD MANUAL: 69 % (ref 42.7–76)
NRBC BLD AUTO-RTO: 2.9 /100 WBC (ref 0–0.2)
NRBC SPEC MANUAL: 2 /100 WBC (ref 0–0.2)
PLATELET # BLD AUTO: 265 10*3/MM3 (ref 140–450)
PMV BLD AUTO: 9.9 FL (ref 6–12)
POTASSIUM SERPL-SCNC: 4.8 MMOL/L (ref 3.5–5.2)
PROT SERPL-MCNC: 6.7 G/DL (ref 6–8.5)
RBC # BLD AUTO: 3.21 10*6/MM3 (ref 3.77–5.28)
RBC AGGLUT BLD QL: ABNORMAL
SINUS: 2.8 CM
SODIUM SERPL-SCNC: 142 MMOL/L (ref 136–145)
STJ: 2.46 CM
VARIANT LYMPHS NFR BLD MANUAL: 18 % (ref 19.6–45.3)
WBC MORPH BLD: NORMAL
WBC NRBC COR # BLD: 12.57 10*3/MM3 (ref 3.4–10.8)

## 2023-10-06 PROCEDURE — 25510000001 PERFLUTREN (DEFINITY) 8.476 MG IN SODIUM CHLORIDE (PF) 0.9 % 10 ML INJECTION: Performed by: HOSPITALIST

## 2023-10-06 PROCEDURE — 94664 DEMO&/EVAL PT USE INHALER: CPT

## 2023-10-06 PROCEDURE — 85025 COMPLETE CBC W/AUTO DIFF WBC: CPT | Performed by: INTERNAL MEDICINE

## 2023-10-06 PROCEDURE — 94761 N-INVAS EAR/PLS OXIMETRY MLT: CPT

## 2023-10-06 PROCEDURE — 71250 CT THORAX DX C-: CPT

## 2023-10-06 PROCEDURE — 80053 COMPREHEN METABOLIC PANEL: CPT | Performed by: INTERNAL MEDICINE

## 2023-10-06 PROCEDURE — 25010000002 FUROSEMIDE PER 20 MG: Performed by: HOSPITALIST

## 2023-10-06 PROCEDURE — 25010000002 CEFTRIAXONE PER 250 MG: Performed by: HOSPITALIST

## 2023-10-06 PROCEDURE — 93306 TTE W/DOPPLER COMPLETE: CPT | Performed by: INTERNAL MEDICINE

## 2023-10-06 PROCEDURE — 94799 UNLISTED PULMONARY SVC/PX: CPT

## 2023-10-06 PROCEDURE — 94669 MECHANICAL CHEST WALL OSCILL: CPT

## 2023-10-06 PROCEDURE — 97530 THERAPEUTIC ACTIVITIES: CPT

## 2023-10-06 PROCEDURE — 97162 PT EVAL MOD COMPLEX 30 MIN: CPT

## 2023-10-06 PROCEDURE — 99232 SBSQ HOSP IP/OBS MODERATE 35: CPT | Performed by: NURSE PRACTITIONER

## 2023-10-06 PROCEDURE — 85007 BL SMEAR W/DIFF WBC COUNT: CPT | Performed by: INTERNAL MEDICINE

## 2023-10-06 PROCEDURE — 93306 TTE W/DOPPLER COMPLETE: CPT

## 2023-10-06 RX ORDER — METOLAZONE 5 MG/1
10 TABLET ORAL ONCE
Status: COMPLETED | OUTPATIENT
Start: 2023-10-06 | End: 2023-10-06

## 2023-10-06 RX ORDER — METOPROLOL TARTRATE 50 MG/1
100 TABLET, FILM COATED ORAL EVERY 12 HOURS SCHEDULED
Status: DISCONTINUED | OUTPATIENT
Start: 2023-10-06 | End: 2023-10-10 | Stop reason: HOSPADM

## 2023-10-06 RX ADMIN — FUROSEMIDE 40 MG: 10 INJECTION, SOLUTION INTRAMUSCULAR; INTRAVENOUS at 15:59

## 2023-10-06 RX ADMIN — BUDESONIDE AND FORMOTEROL FUMARATE DIHYDRATE 2 PUFF: 160; 4.5 AEROSOL RESPIRATORY (INHALATION) at 20:37

## 2023-10-06 RX ADMIN — CEFTRIAXONE SODIUM 1000 MG: 1 INJECTION, POWDER, FOR SOLUTION INTRAMUSCULAR; INTRAVENOUS at 21:01

## 2023-10-06 RX ADMIN — ISODIUM CHLORIDE 3 ML: 0.03 SOLUTION RESPIRATORY (INHALATION) at 14:19

## 2023-10-06 RX ADMIN — GUAIFENESIN 600 MG: 600 TABLET, EXTENDED RELEASE ORAL at 09:35

## 2023-10-06 RX ADMIN — FUROSEMIDE 40 MG: 10 INJECTION, SOLUTION INTRAMUSCULAR; INTRAVENOUS at 22:49

## 2023-10-06 RX ADMIN — BUDESONIDE AND FORMOTEROL FUMARATE DIHYDRATE 2 PUFF: 160; 4.5 AEROSOL RESPIRATORY (INHALATION) at 07:20

## 2023-10-06 RX ADMIN — CITALOPRAM 10 MG: 10 TABLET, FILM COATED ORAL at 09:35

## 2023-10-06 RX ADMIN — Medication 1 CAPSULE: at 09:35

## 2023-10-06 RX ADMIN — GUAIFENESIN AND DEXTROMETHORPHAN 5 ML: 100; 10 SYRUP ORAL at 09:35

## 2023-10-06 RX ADMIN — ISODIUM CHLORIDE 3 ML: 0.03 SOLUTION RESPIRATORY (INHALATION) at 20:28

## 2023-10-06 RX ADMIN — GUAIFENESIN AND DEXTROMETHORPHAN 5 ML: 100; 10 SYRUP ORAL at 15:59

## 2023-10-06 RX ADMIN — METOPROLOL TARTRATE 100 MG: 50 TABLET, FILM COATED ORAL at 09:35

## 2023-10-06 RX ADMIN — FERROUS SULFATE TAB 325 MG (65 MG ELEMENTAL FE) 325 MG: 325 (65 FE) TAB at 09:35

## 2023-10-06 RX ADMIN — GUAIFENESIN 600 MG: 600 TABLET, EXTENDED RELEASE ORAL at 21:01

## 2023-10-06 RX ADMIN — AZITHROMYCIN 250 MG: 250 TABLET, FILM COATED ORAL at 09:35

## 2023-10-06 RX ADMIN — FUROSEMIDE 40 MG: 10 INJECTION, SOLUTION INTRAMUSCULAR; INTRAVENOUS at 06:36

## 2023-10-06 RX ADMIN — IPRATROPIUM BROMIDE AND ALBUTEROL SULFATE 3 ML: 2.5; .5 SOLUTION RESPIRATORY (INHALATION) at 20:24

## 2023-10-06 RX ADMIN — ACETAMINOPHEN 650 MG: 325 TABLET, FILM COATED ORAL at 17:59

## 2023-10-06 RX ADMIN — IPRATROPIUM BROMIDE AND ALBUTEROL SULFATE 3 ML: 2.5; .5 SOLUTION RESPIRATORY (INHALATION) at 07:10

## 2023-10-06 RX ADMIN — GUAIFENESIN AND DEXTROMETHORPHAN 5 ML: 100; 10 SYRUP ORAL at 22:53

## 2023-10-06 RX ADMIN — CETIRIZINE HYDROCHLORIDE 10 MG: 10 TABLET ORAL at 09:35

## 2023-10-06 RX ADMIN — BENZONATATE 200 MG: 100 CAPSULE ORAL at 17:59

## 2023-10-06 RX ADMIN — MULTIPLE VITAMINS W/ MINERALS TAB 1 TABLET: TAB at 09:35

## 2023-10-06 RX ADMIN — METOPROLOL TARTRATE 100 MG: 50 TABLET, FILM COATED ORAL at 21:01

## 2023-10-06 RX ADMIN — METOLAZONE 10 MG: 5 TABLET ORAL at 21:02

## 2023-10-06 RX ADMIN — PERFLUTREN 2 ML: 6.52 INJECTION, SUSPENSION INTRAVENOUS at 11:18

## 2023-10-06 RX ADMIN — BENZONATATE 200 MG: 100 CAPSULE ORAL at 09:35

## 2023-10-06 RX ADMIN — Medication 1000 UNITS: at 09:35

## 2023-10-06 RX ADMIN — ISODIUM CHLORIDE 3 ML: 0.03 SOLUTION RESPIRATORY (INHALATION) at 07:14

## 2023-10-06 RX ADMIN — IPRATROPIUM BROMIDE AND ALBUTEROL SULFATE 3 ML: 2.5; .5 SOLUTION RESPIRATORY (INHALATION) at 14:14

## 2023-10-06 RX ADMIN — MULTIPLE VITAMINS W/ MINERALS TAB 1 TABLET: TAB at 21:01

## 2023-10-06 RX ADMIN — POTASSIUM CHLORIDE 20 MEQ: 750 TABLET, EXTENDED RELEASE ORAL at 09:35

## 2023-10-06 RX ADMIN — APIXABAN 2.5 MG: 2.5 TABLET, FILM COATED ORAL at 15:59

## 2023-10-06 NOTE — PROGRESS NOTES
"      [facility]    Patient Name: Agnieszka Rizzo  :1930  89 y.o.  LOS: 1  Encounter Provider: PJ Shepherd      Patient Care Team:  Matt Rose MD as PCP - General (Family Medicine)  Marcie Robbins MD as Consulting Physician (Cardiology)  Nilesh Danielle MD as Consulting Physician (Urology)  Lina Morris [facility] as Pharmacist  Lev Mckeon PharmD as Pharmacist (Pharmacy)  Rogelio Tucker MD as Consulting Physician (Pulmonary Disease)    Chief Complaint:  Shortness of Breath     Interval History: Still SOA and coughing. CT and bronchoscopy results pending. Sitting up in chair and daughter at bedside.       Objective   Vital Signs  Temp:  [97.7 øF (36.5 øC)-99.3 øF (37.4 øC)] 97.9 øF (36.6 øC)  Heart Rate:  [] 84  Resp:  [18-20] 18  BP: (104-138)/(62-87) 109/69    Intake/Output Summary (Last 24 hours) at 10/6/2023 1308  Last data filed at 10/6/2023 0736  Gross per 24 hour   Intake 240 ml   Output 1000 ml   Net -760 ml     Flowsheet Rows      Flowsheet Row First Filed Value   Admission Height 160 cm (63\") Documented at 10/04/2023 1908   Admission Weight 55.8 kg (123 lb) Documented at 10/04/2023 1908              Vitals reviewed.   Constitutional:       General: Not in acute distress.     Appearance: Well-developed and not in distress. Frail. Chronically ill-appearing. Not diaphoretic.   HENT:      Head: Normocephalic.   Pulmonary:      Effort: Pulmonary effort is normal. No respiratory distress.      Breath sounds: Normal breath sounds. No wheezing. No rhonchi. No rales.   Cardiovascular:      Normal rate. Irregularly irregular rhythm.   Pulses:     Radial: 2+ bilaterally.  Edema:     Peripheral edema absent.   Skin:     General: Skin is warm and dry. There is no cyanosis.      Findings: No rash.   Neurological:      Mental Status: Alert and oriented to person, place, and time.   Psychiatric:         Behavior: Behavior normal. Behavior is cooperative.         Thought " Content: Thought content normal.         Judgment: Judgment normal.         Pertinent Test Results:  Results from last 7 days   Lab Units 10/06/23  0359 10/05/23  1859 10/05/23  0352 10/04/23  1953   SODIUM mmol/L 142  --  144 140   POTASSIUM mmol/L 4.8 3.9 3.1* 3.4*   CHLORIDE mmol/L 102  --  102 99   CO2 mmol/L 29.9*  --  31.5* 27.0   BUN mg/dL 18  --  24* 31*   CREATININE mg/dL 0.94  --  1.01* 1.14*   GLUCOSE mg/dL 114*  --  121* 135*   CALCIUM mg/dL 9.7*  --  9.5 9.3   AST (SGOT) U/L 27  --   --  21   ALT (SGPT) U/L 16  --   --  13     Results from last 7 days   Lab Units 10/04/23  1953   HSTROP T ng/L 31*     Results from last 7 days   Lab Units 10/06/23  0359 10/05/23  0352 10/04/23  1919   WBC 10*3/mm3 12.57* 12.21* 13.91*   HEMOGLOBIN g/dL 10.5* 10.0* 9.6*   HEMATOCRIT % 31.4* 30.0* 29.0*   PLATELETS 10*3/mm3 265 265 278                   Invalid input(s): LDLCALC  Results from last 7 days   Lab Units 10/04/23  1953   PROBNP pg/mL 8,193.0*               Medication Review:   apixaban, 2.5 mg, Oral, Q12H  azithromycin, 250 mg, Oral, Q24H  budesonide-formoterol, 2 puff, Inhalation, BID - RT  cetirizine, 10 mg, Oral, Daily  cholecalciferol, 1,000 Units, Oral, Daily  citalopram, 10 mg, Oral, Daily  ferrous sulfate, 325 mg, Oral, Daily With Breakfast  furosemide, 40 mg, Intravenous, Q8H  guaiFENesin, 600 mg, Oral, BID  ipratropium-albuterol, 3 mL, Nebulization, Q6H While Awake - RT  lactobacillus acidophilus, 1 capsule, Oral, Daily  metoprolol tartrate, 100 mg, Oral, Daily  metoprolol tartrate, 50 mg, Oral, Nightly  multivitamin with minerals, 1 tablet, Oral, BID  potassium chloride, 20 mEq, Oral, Daily  sodium chloride, 3 mL, Nebulization, TID              Assessment & Plan     Active Hospital Problems    Diagnosis  POA    **Acute on chronic diastolic heart failure [I50.33]  Yes    Acute on chronic diastolic congestive heart failure [I50.33]  Yes    Paroxysmal atrial fibrillation [I48.0]  Yes    Primary  hypertension [I10]  Yes    Chronic coronary artery disease [I25.10]  Yes      Resolved Hospital Problems   No resolved problems to display.         Acute on chronic hypoxic respiratory failure. Pulmonology following, CT pending  Acute on chronic diastolic CHF.  Needs further diuresis.  We will continue furosemide 40 mg IV Q 8h today. Renal function stable.  Mitral regurgitation, moderate in the past. Stable on echo this AM  Persistent atrial fibrillation overall rate controlled after digoxin yesterday, but trending up. Will increase metoprolol slightly to 100 mg BID.  Continue apixaban.   Bronchiectasis, on chronic azithromycin  Coronary artery disease. No anginal symptoms  Hypertension Will increase metoprolol slightly to help with rate control.  Multifactorial anemia with stable hemoglobin today      PJ Shepherd  [facility]  10/06/23  13:08 EDT

## 2023-10-06 NOTE — CASE MANAGEMENT/SOCIAL WORK
Continued Stay Note  [facility]     Patient Name: Agnieszka Rizzo  MRN: [id number]  Today's Date: 10/6/2023    Admit Date: 10/4/2023    Plan: Home, family will transport   Discharge Plan       Row Name 10/06/23 1329       Plan    Plan Home, family will transport    Plan Comments Met with pt at bedside. Confirmed that if she should be d/c over the weekend, she had no needs. Asked pt if she wanted home health, replied no. Pt has private duty sitter list at bedside and she stated that was all she needed at this time.                   Discharge Codes    No documentation.                 Expected Discharge Date and Time       Expected Discharge Date Expected Discharge Time    Oct 9, 2023               Elisha Leal RN

## 2023-10-06 NOTE — PROGRESS NOTES
Name: Agnieszka Rizzo ADMIT: 10/4/2023   : 1930  PCP: Matt Rose MD    MRN: [id number] LOS: 1 days   AGE/SEX: 89 y.o. female  ROOM: [facility]     Subjective   Subjective   Patient is seen at bedside, no new complaints.     Objective   Objective   Vital Signs  Temp:  [97.7 øF (36.5 øC)-99.3 øF (37.4 øC)] 99.3 øF (37.4 øC)  Heart Rate:  [] 107  Resp:  [16-20] 16  BP: (109-138)/(63-87) 116/63  SpO2:  [94 %-100 %] 99 %  on  Flow (L/min):  [2] 2;   Device (Oxygen Therapy): nasal cannula  Body mass index is 21.79 kg/mý.  Physical Exam  General, awake and alert.  Head and ENT, normocephalic and atraumatic.  Lungs, symmetric expansion, equal air entry bilaterally.  Heart, regular rate and rhythm.  Abdomen, soft and nontender.  Extremities, no clubbing or cyanosis.  Neuro, no focal deficits.  Skin: Warm and no rash.  Psych, normal mood and affect.  Musculoskeletal, joint examination is grossly normal.    Copied text material from yesterday's note has been reviewed for appropriate changes and remains accurate as of 10/06/23.      Results Review     I reviewed the patient's new clinical results.  Results from last 7 days   Lab Units 10/06/23  0359 10/05/23  0352 10/04/23  1919   WBC 10*3/mm3 12.57* 12.21* 13.91*   HEMOGLOBIN g/dL 10.5* 10.0* 9.6*   PLATELETS 10*3/mm3 265 265 278     Results from last 7 days   Lab Units 10/06/23  0359 10/05/23  1859 10/05/23  0352 10/04/23  1953   SODIUM mmol/L 142  --  144 140   POTASSIUM mmol/L 4.8 3.9 3.1* 3.4*   CHLORIDE mmol/L 102  --  102 99   CO2 mmol/L 29.9*  --  31.5* 27.0   BUN mg/dL 18  --  24* 31*   CREATININE mg/dL 0.94  --  1.01* 1.14*   GLUCOSE mg/dL 114*  --  121* 135*   EGFR mL/min/1.73 56.7*  --  52.0* 45.0*     Results from last 7 days   Lab Units 10/06/23  0359 10/04/23  1953   ALBUMIN g/dL 3.6 3.7   BILIRUBIN mg/dL 0.7 0.4   ALK PHOS U/L 106 114   AST (SGOT) U/L 27 21   ALT (SGPT) U/L 16 13     Results from last 7 days   Lab Units 10/06/23  035  10/05/23  0352 10/04/23  1953   CALCIUM mg/dL 9.7* 9.5 9.3   ALBUMIN g/dL 3.6  --  3.7     Results from last 7 days   Lab Units 10/05/23  0352   PROCALCITONIN ng/mL 0.38*     No results found for: HGBA1C, POCGLU    XR Chest 1 View    Result Date: 10/4/2023  Chronic appearing increased interstitial markings with subtle hazy increased opacity within the left midlung and right base potentially due to superimposed infiltrate. Asymmetric areas of pulmonary edema are also a consideration. The heart size is enlarged, similar to the previous exam.  This report was finalized on 10/4/2023 8:28 PM by Dr. Ced Bonilla M.D on Workstation: [id number]       I have personally reviewed all medications:  Scheduled Medications  apixaban, 2.5 mg, Oral, Q12H  azithromycin, 250 mg, Oral, Q24H  budesonide-formoterol, 2 puff, Inhalation, BID - RT  cefTRIAXone, 1,000 mg, Intravenous, Q24H  cetirizine, 10 mg, Oral, Daily  cholecalciferol, 1,000 Units, Oral, Daily  citalopram, 10 mg, Oral, Daily  ferrous sulfate, 325 mg, Oral, Daily With Breakfast  furosemide, 40 mg, Intravenous, Q8H  guaiFENesin, 600 mg, Oral, BID  ipratropium-albuterol, 3 mL, Nebulization, Q6H While Awake - RT  lactobacillus acidophilus, 1 capsule, Oral, Daily  metoprolol tartrate, 100 mg, Oral, Q12H  multivitamin with minerals, 1 tablet, Oral, BID  potassium chloride, 20 mEq, Oral, Daily  sodium chloride, 3 mL, Nebulization, TID    Infusions   Diet  Diet: Cardiac Diets; Healthy Heart (2-3 Na+); Texture: Regular Texture (IDDSI 7); Fluid Consistency: Thin (IDDSI 0)    I have personally reviewed:  [x]  Laboratory   [x]  Microbiology   [x]  Radiology   [x]  EKG/Telemetry  [x]  Cardiology/Vascular   []  Pathology    []  Records       Assessment/Plan     Active Hospital Problems    Diagnosis  POA    **Acute on chronic diastolic heart failure [I50.33]  Yes    Acute on chronic diastolic congestive heart failure [I50.33]  Yes    Paroxysmal atrial fibrillation [I48.0]  Yes     Primary hypertension [I10]  Yes    Chronic coronary artery disease [I25.10]  Yes      Resolved Hospital Problems   No resolved problems to display.       89 y.o. female admitted with Acute on chronic diastolic heart failure.    Acute exacerbation of CHF (congestive heart failure)  Continue IV lasix , follow-up with cardiology recommendations.  Consult nephrology.     Acute on chronic respiratory failure  Asthma  Follow-up with pulmonary recommendations.     Proximal atrial fibrillation  Chronic  AC Eliquis- last dose this evening   RC metoprolol     Primary hypertension  Chronic   Home metoprolol     Iron deficiency anemia  Monitor hemoglobin trend.        I discussed the patient's findings and my recommendations with patient and family.     VTE Prophylaxis - Eliquis (home med).  Code Status - DNR.      Skinny Serna MD  [facility]  10/06/23  16:55 EDT

## 2023-10-06 NOTE — PLAN OF CARE
Goal Outcome Evaluation:  Plan of Care Reviewed With: patient           Outcome Evaluation: Pt. is a 89 year old Female admitted to the hospital with Acute on Chronic diastolic heart failure.  Pt. reports that prior to admission she was independent with functional mobility and only used a Rwx for community ambulation.  Pt. currently presents with decreased strength, decreased balance, and decreased tolerance to functional activity.  This PM, pt. able to ambulate 35 feet, CGA x 1, with no use of A.D. Pt. requires CGA x 1 for sit <-> stand transfers.  Mild unsteadiness during ambulation but no overt losses of balance.  Pt. will benefit from skilled inpt. P.T. to address her functional deficits and to assist pt. in regaining her maximum level of independence with functional mobility.      Anticipated Discharge Disposition (PT): home with assist, home with home health

## 2023-10-06 NOTE — PROGRESS NOTES
Consult Daily Progress Note  [address]  9/23/2023    Patient Name:  Agnieszka Rizzo  MRN:  [id number]   YOB: 1930  Age: 89 y.o.  Sex: female  LOS: 1    Reason for Admission / Chief Complaint:  Shortness of breath, hypoxic respiratory failure    Hospital Course:   89-year-old with heart failure with preserved ejection fraction, atrial fibrillation on Eliquis, bronchiectasis, coronary disease, history of aspergillus , on chronic azithromycin, chronic hypoxic respiratory failure (2 L nasal cannula at night) who presents with worsening shortness of breath after bronchoscopy performed 2 days ago.  Found to be in acute CHF exacerbation undergoing diuresis.    Interval History:  No acute events overnight  Did not get CT scan overnight, going today  Denies any worsening shortness of breath or cough  Denies any chest pain or palpitations  Denies any nausea, vomiting, diarrhea  Net -800 cc yesterday    Physical Exam:  Vitals:    10/06/23 0736   BP: 127/75   Pulse: 111   Resp: 18   Temp: 97.9 øF (36.6 øC)   SpO2: 95%       Intake/Output    Intake/Output Summary (Last 24 hours) at 10/6/2023 0835  Last data filed at 10/6/2023 0736  Gross per 24 hour   Intake 480 ml   Output 1000 ml   Net -520 ml       General: Alert, nontoxic, NAD, elderly  HEENT: NC/AT, EOMI, MMM  Neck: Supple, trachea midline  Cardiac: RRR, no murmur, gallops, rubs  Pulmonary: Mild crackles at lung bases  GI: Soft, non-tender, non-distended, normal bowel sounds  Extremities: Warm, well perfused, trace LE edema  Skin: no visible rash  Neuro: CN II - XII grossly intact  Psychiatry: Normal mood and affect      Data Review:  Results from last 7 days   Lab Units 10/06/23  0359 10/05/23  0352 10/04/23  1919   WBC 10*3/mm3 12.57* 12.21* 13.91*   HEMOGLOBIN g/dL 10.5* 10.0* 9.6*   PLATELETS 10*3/mm3 265 265 278     Results from last 7 days   Lab Units 10/06/23  0359 10/05/23  1859 10/05/23  0352 10/04/23  1953   SODIUM mmol/L 142   --  144 140   POTASSIUM mmol/L 4.8 3.9 3.1* 3.4*   CHLORIDE mmol/L 102  --  102 99   CO2 mmol/L 29.9*  --  31.5* 27.0   BUN mg/dL 18  --  24* 31*   CREATININE mg/dL 0.94  --  1.01* 1.14*   GLUCOSE mg/dL 114*  --  121* 135*   CALCIUM mg/dL 9.7*  --  9.5 9.3   Estimated Creatinine Clearance: 33.1 mL/min (by C-G formula based on SCr of 0.94 mg/dL).    Results from last 7 days   Lab Units 10/06/23  0359 10/05/23  0352 10/04/23  1953 10/04/23  1919   AST (SGOT) U/L 27  --  21  --    ALT (SGPT) U/L 16  --  13  --    PROCALCITONIN ng/mL  --  0.38*  --   --    PLATELETS 10*3/mm3 265 265  --  278               Imaging:  Chest x-ray from 10/4 reviewed. CT chest from 9/20 reviewed.     ASSESSMENT  /  PLAN:  Acute on chronic hypoxic respiratory failure  2 L nasal cannula at night  Acute exacerbation of heart failure with preserved ejection fraction  Atrial fibrillation on Eliquis-in RVR  Bronchiectasis on chronic azithromycin  History of Aspergillus  Coronary disease  Leukocytosis    -Ongoing diuresis, was net negative yesterday, states respiratory status is stable  -Wean oxygen as tolerated for SPO2 goal greater than 90%  -CT chest pending, anticipate that there will be an infiltrate in the left lingula where she had bronchioloalveolar lavage performed.  We will look for any other consolidations or concerns for infection.  -Follow-up infectious work-up  -Continue chronic azithromycin  -Continue airway clearance with nebulizers and saline    Thank you for allowing us to participate in this patients care. Pulmonary will continue to follow.     Sebastian Rahman MD  [facility]  Pulmonary and Critical Care Medicine, Interventional Pulmonology    Parts of this note may be an electronic transcription/translation of spoken language to printed text using the Dragon dictation system.     Addendum  =========================  CT chest reviewed, bilateral patchy opacities.  Patient's still requiring oxygen during the day  despite diuresis and leukocytosis persists.  BAL culture pending, will initiate ceftriaxone at this time.

## 2023-10-06 NOTE — THERAPY EVALUATION
Patient Name: Agnieszka Rizzo  : 1930    MRN: [id number]                              Today's Date: 10/6/2023       Admit Date: 10/4/2023    Visit Dx:     ICD-10-CM ICD-9-CM   1. Acute on chronic diastolic congestive heart failure  I50.33 428.33     428.0     Patient Active Problem List   Diagnosis    Primary hypertension    Chronic coronary artery disease    Familial hypercholesterolemia    Mitral valve insufficiency    Ventricular premature beats    Ventricular tachycardia    Acute on chronic respiratory failure with hypoxia    Acid-fast bacteria present    DNR (do not resuscitate)    Chronic diastolic CHF (congestive heart failure)    Asymptomatic bacteriuria    Iron deficiency anemia    Hypokalemia    Bronchiectasis    Pneumonia of both lungs due to infectious organism    KINA (mycobacterium avium-intracellulare)    Paroxysmal atrial fibrillation    Anxiety    Exposure to hepatitis A    Medicare annual wellness visit, subsequent    Abdominal pain    Herpes infection    Moderate asthma with acute exacerbation    Bronchitis, acute, with bronchospasm    Abnormal EKG    Fall    Laceration of left upper extremity    Multiple skin tears    Alteration in anticoagulation    Blind right eye    Clinical diagnosis of COVID-19    Pneumonia of right lung due to infectious organism, unspecified part of lung    COPD (chronic obstructive pulmonary disease)    Dizziness    Bronchiectasis    Sepsis due to pneumonia    Acute pulmonary edema    Electrolyte imbalance    Acute on chronic diastolic congestive heart failure    Acute on chronic diastolic heart failure     Past Medical History:   Diagnosis Date    Aspergillus     Asthma     Atrial fibrillation     chronic    Atrial flutter     Bronchiectasis     C. difficile diarrhea 2017    CAD (coronary artery disease)     nonobstructive    Chronic diastolic CHF (congestive heart failure)     Colitis     Cough     Cryoglobulinemia     Dyspnea on exertion     Fall      Hyperlipidemia     Hypertension     Hyponatremia     Hypoxia     Infectious viral hepatitis     AGE 13    Left shoulder pain     Leg swelling     Lesion of lung     Malignant hyperthermia due to anesthesia     Mild tricuspid regurgitation     MR (mitral regurgitation)     mild    MVP (mitral valve prolapse)     Permanent atrial fibrillation     Pneumonia with the fungal infection aspergillosis 01/06/2017    Pneumothorax     SOB (shortness of breath)     UTI (urinary tract infection)     Wheeze     mild     Past Surgical History:   Procedure Laterality Date    BRONCHOSCOPY N/A 11/12/2016    Procedure: BRONCHOSCOPY WITH FLUORO, BRUSHINGS, BAL, AND BIOPSIES;  Surgeon: Rogelio Tucker MD;  Location: [facility] ENDOSCOPY;  Service:     BRONCHOSCOPY Bilateral 06/03/2017    Procedure: BRONCHOSCOPY with BAL ;  Surgeon: Sung King MD;  [facility];  Service:     BRONCHOSCOPY N/A 12/17/2019    Procedure: BRONCHOSCOPY WITH WASHINGS;  Surgeon: Rogelio Tucker MD;  Location: [facility] ENDOSCOPY;  Service: Pulmonary    BRONCHOSCOPY N/A 07/15/2022    Procedure: BRONCHOSCOPY;  Surgeon: Rogelio Tucker MD;  Location: [facility] ENDOSCOPY;  Service: Pulmonary;  Laterality: N/A;  Pre: Pneumonia  Post: Pneumonia    BRONCHOSCOPY N/A 10/2/2023    Procedure: BRONCHOSCOPY WITH BRONCHIAL AVEOLAR LAVAGE;  Surgeon: Rogelio Tucker MD;  Location: [facility] ENDOSCOPY;  Service: Pulmonary;  Laterality: N/A;  PRE:BRONCHIECTASIS /   POST: SAME    CATARACT EXTRACTION EXTRACAPSULAR W/ INTRAOCULAR LENS IMPLANTATION      COLONOSCOPY      2013    D & C WITH SUCTION      HYSTERECTOMY      KNEE ARTHROSCOPY Left     Partial      General Information       Row Name 10/06/23 7545          Physical Therapy Time and Intention    Document Type evaluation  Pt. admitted with Acute on Chronic Diastolic heart Failure  -MS     Mode of Treatment physical therapy;individual therapy  -MS       Row Name 10/06/23 9213          General Information    Patient Profile Reviewed yes   -MS     Prior Level of Function independent:  -MS     Existing Precautions/Restrictions oxygen therapy device and L/min  No chair alarm in use upon entering room.  -MS     Barriers to Rehab none identified  -MS       Row Name 10/06/23 1533          Cognition    Orientation Status (Cognition) oriented x 3  -MS       Row Name 10/06/23 1533          Safety Issues, Functional Mobility    Comment, Safety Issues/Impairments (Mobility) Gait belt used for safety.  -MS               User Key  (r) = Recorded By, (t) = Taken By, (c) = Cosigned By      Initials Name Provider Type    Karel Rodriguez, PT Physical Therapist                   Mobility       Row Name 10/06/23 1534          Bed Mobility    Comment, (Bed Mobility) Up in chair upon entering room.  NO chair alarm in use.  -MS       Row Name 10/06/23 1534          Sit-Stand Transfer    Sit-Stand [place] (Transfers) contact guard  -MS       Row Name 10/06/23 1534          Gait/Stairs (Locomotion)    [place] Level (Gait) contact guard  -MS     Distance in Feet (Gait) 35 feet  -MS     Deviations/Abnormal Patterns (Gait) cecilia decreased  -MS     Comment, (Gait/Stairs) Mild unsteadiness but no overt losses of balance noted.  Limited in gait distance due to fatigue and SOA.  -MS               User Key  (r) = Recorded By, (t) = Taken By, (c) = Cosigned By      Initials Name Provider Type    Karel Rodriguez, PT Physical Therapist                   Obj/Interventions       Row Name 10/06/23 1535          Range of Motion Comprehensive    Comment, General Range of Motion BUE/LE (WFL's)  -MS       Row Name 10/06/23 1535          Strength Comprehensive (MMT)    Comment, General Manual Muscle Testing (MMT) Assessment BUE/LE (3+/5)  -MS               User Key  (r) = Recorded By, (t) = Taken By, (c) = Cosigned By      Initials Name Provider Type    Karel Rodriguez, PT Physical Therapist                   Goals/Plan       Row Name 10/06/23 1536          Bed  Mobility Goal 1 (PT)    Activity/Assistive Device (Bed Mobility Goal 1, PT) bed mobility activities, all  -MS     [place] Level/Cues Needed (Bed Mobility Goal 1, PT) independent  -MS     Time Frame (Bed Mobility Goal 1, PT) long term goal (LTG);1 week  -MS       Row Name 10/06/23 1536          Transfer Goal 1 (PT)    Activity/Assistive Device (Transfer Goal 1, PT) transfers, all  -MS     [place] Level/Cues Needed (Transfer Goal 1, PT) independent  -MS     Time Frame (Transfer Goal 1, PT) long term goal (LTG);1 week  -MS       Row Name 10/06/23 1536          Gait Training Goal 1 (PT)    Activity/Assistive Device (Gait Training Goal 1, PT) gait (walking locomotion)  With or without AAD  -MS     [place] Level (Gait Training Goal 1, PT) independent  -MS     Distance (Gait Training Goal 1, PT) 100 feet  -MS     Time Frame (Gait Training Goal 1, PT) long term goal (LTG);1 week  -MS       Row Name 10/06/23 1536          Therapy Assessment/Plan (PT)    Planned Therapy Interventions (PT) balance training;bed mobility training;gait training;home exercise program;postural re-education;transfer training;strengthening  -MS               User Key  (r) = Recorded By, (t) = Taken By, (c) = Cosigned By      Initials Name Provider Type    Karel Rodriguez, PT Physical Therapist                   Clinical Impression       Row Name 10/06/23 1536          Pain    Pretreatment Pain Rating 0/10 - no pain  -MS     Posttreatment Pain Rating 0/10 - no pain  -MS       Row Name 10/06/23 1536          Plan of Care Review    Plan of Care Reviewed With patient  -MS       Row Name 10/06/23 1536          Therapy Assessment/Plan (PT)    Rehab Potential (PT) good, to achieve stated therapy goals  -MS     Criteria for Skilled Interventions Met (PT) skilled treatment is necessary  -MS     Therapy Frequency (PT) 6 times/wk  -MS       Row Name 10/06/23 1536          Positioning and Restraints    Pre-Treatment Position sitting in  chair/recliner  -MS     Post Treatment Position chair  -MS     In Chair notified nsg;reclined;sitting;call light within reach;encouraged to call for assist  All lines intact.  -MS               User Key  (r) = Recorded By, (t) = Taken By, (c) = Cosigned By      Initials Name Provider Type    Karel Rodriguez, PT Physical Therapist                   Outcome Measures       Row Name 10/06/23 1536          How much help from another person do you currently need...    Turning from your back to your side while in flat bed without using bedrails? 3  -MS     Moving from lying on back to sitting on the side of a flat bed without bedrails? 3  -MS     Moving to and from a bed to a chair (including a wheelchair)? 3  -MS     Standing up from a chair using your arms (e.g., wheelchair, bedside chair)? 3  -MS     Climbing 3-5 steps with a railing? 3  -MS     To walk in hospital room? 3  -MS     AM-PAC 6 Clicks Score (PT) 18  -MS     Highest level of mobility 6 --> Walked 10 steps or more  -MS       Row Name 10/06/23 1536          Functional Assessment    Outcome Measure Options AM-PAC 6 Clicks Basic Mobility (PT)  -MS               User Key  (r) = Recorded By, (t) = Taken By, (c) = Cosigned By      Initials Name Provider Type    Karel Rodriguez, PT Physical Therapist                                 Physical Therapy Education       Title: PT OT SLP Therapies (In Progress)       Topic: Physical Therapy (In Progress)       Point: Mobility training (Done)       Learning Progress Summary             Patient Acceptance, E,D, VU,NR by MS at 10/6/2023 1537                         Point: Home exercise program (Not Started)       Learner Progress:  Not documented in this visit.              Point: Body mechanics (Done)       Learning Progress Summary             Patient Acceptance, E,D, VU,NR by MS at 10/6/2023 1537                         Point: Precautions (Done)       Learning Progress Summary             Patient Acceptance, E,D,  VU,NR by MS at 10/6/2023 1537                                         User Key       Initials Effective Dates Name Provider Type Discipline    MS 06/16/21 -  Karel Daniels PT Physical Therapist PT                  PT Recommendation and Plan  Planned Therapy Interventions (PT): balance training, bed mobility training, gait training, home exercise program, postural re-education, transfer training, strengthening  Plan of Care Reviewed With: patient  Outcome Evaluation: Pt. is a 89 year old Female admitted to the hospital with Acute on Chronic diastolic heart failure.  Pt. reports that prior to admission she was independent with functional mobility and only used a Rwx for community ambulation.  Pt. currently presents with decreased strength, decreased balance, and decreased tolerance to functional activity.  This PM, pt. able to ambulate 35 feet, CGA x 1, with no use of A.D. Pt. requires CGA x 1 for sit <-> stand transfers.  Mild unsteadiness during ambulation but no overt losses of balance.  Pt. will benefit from skilled inpt. P.T. to address her functional deficits and to assist pt. in regaining her maximum level of independence with functional mobility.     Time Calculation:         PT Charges       Row Name 10/06/23 1540             Time Calculation    Start Time 1420  -MS      Stop Time 1440  -MS      Time Calculation (min) 20 min  -MS      PT Received On 10/06/23  -MS      PT - Next Appointment 10/07/23  -MS      PT Goal Re-Cert Due Date 10/13/23  -MS         Time Calculation- PT    Total Timed Code Minutes- PT 18 minute(s)  -MS                User Key  (r) = Recorded By, (t) = Taken By, (c) = Cosigned By      Initials Name Provider Type    MS Karel Daniels PT Physical Therapist                  Therapy Charges for Today       Code Description Service Date Service Provider Modifiers Qty    [id number]  PT EVAL MOD COMPLEXITY 2 10/6/2023 Karel Daniels, PT GP 1    [id number]  PT THERAPEUTIC ACT EA  15 MIN 10/6/2023 Karel Daniels, PT GP 1            PT G-Codes  Outcome Measure Options: AM-PAC 6 Clicks Basic Mobility (PT)  AM-PAC 6 Clicks Score (PT): 18  PT Discharge Summary  Anticipated Discharge Disposition (PT): home with assist, home with home health    Karel Daniels, PT  10/6/2023

## 2023-10-06 NOTE — PLAN OF CARE
Goal Outcome Evaluation:  Plan of Care Reviewed With: patient        Progress: no change  Outcome Evaluation: Pt AOX4. Rested well between care. CTM, safety maintained.    Diuretic in Use: Lasix IV  Response to Diuretics (Output greater than intake): greater   Daily Weight (up or down): down  O2 Requirements: 2L baseline  Functional Status (Activity level, tolerance and respiratory symptoms): SOA  Discharge Plans:  TBD

## 2023-10-06 NOTE — CONSULTS
INITIAL CONSULT NOTE      Name: Agnieszka Rizzo ADMIT: 10/4/2023   : 1930  PCP: Matt Rose MD    MRN: [id number] LOS: 1 days   AGE/SEX: 89 y.o. female  ROOM: N438/1     Date of Service: 10/6/2023                        Reason for Consult:         Ckd 3, chf, volume overload      History of Present Illness:       Patient is a 89-year-old woman who has history of coronary artery disease, atrial fibrillation, history of congestive heart failure, ejection fraction 56%, LVH, diastolic dysfunction, right ventricular systolic pressure 49 mmHg, CKD stage II-III, baseline creatinine variable from 0.9-1.26,GFR around 56 mL/min, urine completely bland, no hematuria no proteinuria no pyuria, CKD likely secondary to atherosclerotic disease, presented to emergency room with complaints of shortness of breath, peripheral edema, chest x-ray showed pulmonary edema, patient was restarted on IV diuretics, cardiology and pulmonary consulted, nephrology consulted for volume management and baseline CKD.Patient is not on any NSAIDs no recent records no angiotensin receptor blockers currently on loop diuretic Lasix.        Past History/Allergies?Social History:     Past Medical History:   Diagnosis Date    Aspergillus     Asthma     Atrial fibrillation     chronic    Atrial flutter     Bronchiectasis     C. difficile diarrhea 2017    CAD (coronary artery disease)     nonobstructive    Chronic diastolic CHF (congestive heart failure)     Colitis     Cough     Cryoglobulinemia     Dyspnea on exertion     Fall     Hyperlipidemia     Hypertension     Hyponatremia     Hypoxia     Infectious viral hepatitis     AGE 13    Left shoulder pain     Leg swelling     Lesion of lung     Malignant hyperthermia due to anesthesia     Mild tricuspid regurgitation     MR (mitral regurgitation)     mild    MVP (mitral valve prolapse)     Permanent atrial fibrillation     Pneumonia with the fungal infection aspergillosis 2017     Pneumothorax     SOB (shortness of breath)     UTI (urinary tract infection)     Wheeze     mild         Past Surgical History :     Past Surgical History:   Procedure Laterality Date    BRONCHOSCOPY N/A 11/12/2016    Procedure: BRONCHOSCOPY WITH FLUORO, BRUSHINGS, BAL, AND BIOPSIES;  Surgeon: Rogelio Tucker MD;  Location: [facility] ENDOSCOPY;  Service:     BRONCHOSCOPY Bilateral 06/03/2017    Procedure: BRONCHOSCOPY with BAL ;  Surgeon: Sung King MD;  Location: [facility] ENDOSCOPY;  Service:     BRONCHOSCOPY N/A 12/17/2019    Procedure: BRONCHOSCOPY WITH WASHINGS;  Surgeon: Rogelio Tucker MD;  Location: [facility] ENDOSCOPY;  Service: Pulmonary    BRONCHOSCOPY N/A 07/15/2022    Procedure: BRONCHOSCOPY;  Surgeon: Rogelio Tucker MD;  Location: [facility] ENDOSCOPY;  Service: Pulmonary;  Laterality: N/A;  Pre: Pneumonia  Post: Pneumonia    BRONCHOSCOPY N/A 10/2/2023    Procedure: BRONCHOSCOPY WITH BRONCHIAL AVEOLAR LAVAGE;  Surgeon: Rogelio Tucker MD;  Location: [facility] ENDOSCOPY;  Service: Pulmonary;  Laterality: N/A;  PRE:BRONCHIECTASIS /   POST: SAME    CATARACT EXTRACTION EXTRACAPSULAR W/ INTRAOCULAR LENS IMPLANTATION      COLONOSCOPY      2013    D & C WITH SUCTION      HYSTERECTOMY      KNEE ARTHROSCOPY Left     Partial          Family History:     Family History   Problem Relation Age of Onset    Hypertension Mother     Stroke Mother     Heart disease Father     Hypertension Father     Cancer Brother     Cancer Son           Social History:     Social History     Tobacco Use    Smoking status: Never    Smokeless tobacco: Never    Tobacco comments:     caffiene daily   Substance Use Topics    Alcohol use: Not Currently     Alcohol/week: 2.0 standard drinks     Types: 1 Glasses of wine, 1 Shots of liquor per week          Allergies:     Allergies   Allergen Reactions    Amlodipine Besylate Swelling    Aspirin GI Intolerance     Caused bleeding ulcers    Bactrim [Sulfamethoxazole-Trimethoprim] Nausea And Vomiting    Erythromycin  Unknown (See Comments)     Patient does not recall type of reaction.    Levaquin [Levofloxacin] Unknown (See Comments)     Nausea      Nitrofurantoin Nausea Only and Nausea And Vomiting    Ramipril Other (See Comments)     Cough         Home meds:     Medications Prior to Admission   Medication Sig Dispense Refill Last Dose    acetaminophen (TYLENOL) 325 MG tablet Take 2 tablets by mouth Every 4 (Four) Hours As Needed for Moderate Pain.   10/4/2023    albuterol sulfate  (90 Base) MCG/ACT inhaler Inhale 2 puffs Every 6 (Six) Hours As Needed for Wheezing or Shortness of Air. 8 g 11 10/3/2023    apixaban (ELIQUIS) 2.5 MG tablet tablet Take 1 tablet by mouth Every 12 (Twelve) Hours. Indications: Other - full anticoagulation 180 tablet 3 10/4/2023    benzonatate (TESSALON) 200 MG capsule Take 1 capsule by mouth 3 (Three) Times a Day As Needed. Indications: Cough   10/4/2023    cholecalciferol (VITAMIN D3) 25 MCG (1000 UT) tablet Take 1 tablet by mouth Daily.   10/4/2023    citalopram (CeleXA) 10 MG tablet Take 1 tablet by mouth Daily. 90 tablet 2 10/4/2023    FeroSul 325 (65 Fe) MG tablet TAKE ONE TABLET BY MOUTH DAILY WITH BREAKFAST (Patient taking differently: Take 1 tablet by mouth Daily.) 90 tablet 0 10/4/2023    fexofenadine (ALLEGRA) 180 MG tablet Take 1 tablet by mouth Daily.   10/4/2023    fluticasone (FLONASE) 50 MCG/ACT nasal spray 2 sprays into the nostril(s) as directed by provider At Night As Needed for Allergies.   10/4/2023    fluticasone-salmeterol (ADVAIR HFA) 115-21 MCG/ACT inhaler Inhale 2 puffs 2 (Two) Times a Day.   10/4/2023    guaiFENesin (MUCINEX) 600 MG 12 hr tablet Take 1 tablet by mouth 2 (Two) Times a Day.   10/4/2023    Lactobacillus (FLORAJEN ACIDOPHILUS) capsule Take 1 tablet by mouth Daily.   10/4/2023    metoprolol tartrate (LOPRESSOR) 100 MG tablet Take 1 tablet by mouth Every Morning for 360 days. 90 tablet 3 10/4/2023    Multiple Vitamins-Minerals (PRESERVISION AREDS PO) Take 1  tablet by mouth 2 (Two) Times a Day.   10/4/2023    potassium chloride (K-DUR,KLOR-CON) 20 MEQ CR tablet Take 1 tablet by mouth Daily. 90 tablet 3 10/4/2023    torsemide (DEMADEX) 20 MG tablet Take 2 tablets by mouth 2 (Two) Times a Day for 360 days. 360 tablet 3 10/4/2023    azithromycin (ZITHROMAX) 250 MG tablet Take 1 tablet by mouth Daily.   Unknown    ipratropium-albuterol (DUO-NEB) 0.5-2.5 mg/3 ml nebulizer Take 3 mL by nebulization 2 (Two) Times a Day.   Unknown    metoprolol tartrate (LOPRESSOR) 50 MG tablet Take 1 tablet by mouth Every Night.       sodium chloride 0.9 % nebulizer solution Take 3 mL by nebulization 2 (Two) Times a Day.   Unknown         Scheduled meds:   apixaban, 2.5 mg, Oral, Q12H  azithromycin, 250 mg, Oral, Q24H  budesonide-formoterol, 2 puff, Inhalation, BID - RT  cefTRIAXone, 1,000 mg, Intravenous, Q24H  cetirizine, 10 mg, Oral, Daily  cholecalciferol, 1,000 Units, Oral, Daily  citalopram, 10 mg, Oral, Daily  ferrous sulfate, 325 mg, Oral, Daily With Breakfast  furosemide, 40 mg, Intravenous, Q8H  guaiFENesin, 600 mg, Oral, BID  ipratropium-albuterol, 3 mL, Nebulization, Q6H While Awake - RT  lactobacillus acidophilus, 1 capsule, Oral, Daily  metoprolol tartrate, 100 mg, Oral, Q12H  multivitamin with minerals, 1 tablet, Oral, BID  potassium chloride, 20 mEq, Oral, Daily  sodium chloride, 3 mL, Nebulization, TID          Objectives:     tMax 24 hrs: Temp (24hrs), Av.4 øF (36.9 øC), Min:97.7 øF (36.5 øC), Max:99.3 øF (37.4 øC)      Vitals Ranges:   Temp:  [97.7 øF (36.5 øC)-99.3 øF (37.4 øC)] 99.3 øF (37.4 øC)  Heart Rate:  [] 107  Resp:  [16-20] 16  BP: (109-138)/(63-87) 116/63    Intake and Output Last 3 Shifts:   I/O last 3 completed shifts:  In: 480 [P.O.:480]  Out: 2900 [Urine:2900]          Data Review:       Labs reviewed    Imaging:      Radiology reviewed      Assessment:        Acute on chronic diastolic heart failure    Primary hypertension    Chronic coronary artery  disease    Paroxysmal atrial fibrillation    Acute on chronic diastolic congestive heart failure    CKD stage III secondary to atherosclerotic disease completely bland urine, no hematuria no proteinuria no pyuria CKD secondary to atherosclerotic disease.  Volume overload  Congestive heart failure ejection fraction 56%, LVH, diastolic dysfunction, pulmonary hypertension.  Atrial fibrillation.  Coronary artery disease.  Bronchiectasis.  Chronic anemia.    Acute hypoxic respiratory failure.      Plan:     Patient is currently on loop diuretic Lasix40 mg IV every 8 hours, potassium, renal function is stable.  Volume is better.  Will give metolazone 10 mg p.o. today.  Will check chest x-ray in a.m.  Mild metabolic alkalosis likely secondary to respiratory acidosis.  We will follow-up this patient closely.  Monitor intake output Daily weight.  Will avoid nephrotoxic medications.  Pulmonary cardiology plan noted.  Thanks for consultation.  Full consult to be dictated.      Cody Ayala MD  [facility]  10/6/2023  17:42 EDT

## 2023-10-07 ENCOUNTER — APPOINTMENT (OUTPATIENT)
Dept: GENERAL RADIOLOGY | Facility: HOSPITAL | Age: 88
DRG: 291 | End: 2023-10-07
Payer: MEDICARE

## 2023-10-07 LAB
ALBUMIN SERPL-MCNC: 3.7 G/DL (ref 3.5–5.2)
ALBUMIN/GLOB SERPL: 1.1 G/DL
ALP SERPL-CCNC: 119 U/L (ref 39–117)
ALT SERPL W P-5'-P-CCNC: 25 U/L (ref 1–33)
ANION GAP SERPL CALCULATED.3IONS-SCNC: 13.4 MMOL/L (ref 5–15)
AST SERPL-CCNC: 39 U/L (ref 1–32)
BASOPHILS # BLD AUTO: 0.08 10*3/MM3 (ref 0–0.2)
BASOPHILS NFR BLD AUTO: 0.7 % (ref 0–1.5)
BILIRUB SERPL-MCNC: 0.8 MG/DL (ref 0–1.2)
BUN SERPL-MCNC: 22 MG/DL (ref 8–23)
BUN/CREAT SERPL: 22.7 (ref 7–25)
CALCIUM SPEC-SCNC: 9.9 MG/DL (ref 8.2–9.6)
CHLORIDE SERPL-SCNC: 95 MMOL/L (ref 98–107)
CO2 SERPL-SCNC: 30.6 MMOL/L (ref 22–29)
CREAT SERPL-MCNC: 0.97 MG/DL (ref 0.57–1)
DEPRECATED RDW RBC AUTO: 48.5 FL (ref 37–54)
EGFRCR SERPLBLD CKD-EPI 2021: 54.6 ML/MIN/1.73
EOSINOPHIL # BLD AUTO: 0.24 10*3/MM3 (ref 0–0.4)
EOSINOPHIL NFR BLD AUTO: 2.1 % (ref 0.3–6.2)
ERYTHROCYTE [DISTWIDTH] IN BLOOD BY AUTOMATED COUNT: 13.6 % (ref 12.3–15.4)
GLOBULIN UR ELPH-MCNC: 3.4 GM/DL
GLUCOSE SERPL-MCNC: 111 MG/DL (ref 65–99)
HCT VFR BLD AUTO: 34.3 % (ref 34–46.6)
HGB BLD-MCNC: 11.4 G/DL (ref 12–15.9)
IMM GRANULOCYTES # BLD AUTO: 0.18 10*3/MM3 (ref 0–0.05)
IMM GRANULOCYTES NFR BLD AUTO: 1.6 % (ref 0–0.5)
LYMPHOCYTES # BLD AUTO: 2.26 10*3/MM3 (ref 0.7–3.1)
LYMPHOCYTES NFR BLD AUTO: 19.7 % (ref 19.6–45.3)
MCH RBC QN AUTO: 32 PG (ref 26.6–33)
MCHC RBC AUTO-ENTMCNC: 33.2 G/DL (ref 31.5–35.7)
MCV RBC AUTO: 96.3 FL (ref 79–97)
MONOCYTES # BLD AUTO: 1.35 10*3/MM3 (ref 0.1–0.9)
MONOCYTES NFR BLD AUTO: 11.8 % (ref 5–12)
NEUTROPHILS NFR BLD AUTO: 64.1 % (ref 42.7–76)
NEUTROPHILS NFR BLD AUTO: 7.37 10*3/MM3 (ref 1.7–7)
NRBC BLD AUTO-RTO: 0 /100 WBC (ref 0–0.2)
PLATELET # BLD AUTO: 281 10*3/MM3 (ref 140–450)
PMV BLD AUTO: 10.8 FL (ref 6–12)
POTASSIUM SERPL-SCNC: 3.6 MMOL/L (ref 3.5–5.2)
PROCALCITONIN SERPL-MCNC: 0.21 NG/ML (ref 0–0.25)
PROT SERPL-MCNC: 7.1 G/DL (ref 6–8.5)
RBC # BLD AUTO: 3.56 10*6/MM3 (ref 3.77–5.28)
SODIUM SERPL-SCNC: 139 MMOL/L (ref 136–145)
WBC NRBC COR # BLD: 11.48 10*3/MM3 (ref 3.4–10.8)

## 2023-10-07 PROCEDURE — 99232 SBSQ HOSP IP/OBS MODERATE 35: CPT | Performed by: INTERNAL MEDICINE

## 2023-10-07 PROCEDURE — 94799 UNLISTED PULMONARY SVC/PX: CPT

## 2023-10-07 PROCEDURE — 25010000002 CEFTRIAXONE PER 250 MG: Performed by: HOSPITALIST

## 2023-10-07 PROCEDURE — 84145 PROCALCITONIN (PCT): CPT | Performed by: HOSPITALIST

## 2023-10-07 PROCEDURE — 80053 COMPREHEN METABOLIC PANEL: CPT | Performed by: INTERNAL MEDICINE

## 2023-10-07 PROCEDURE — 94761 N-INVAS EAR/PLS OXIMETRY MLT: CPT

## 2023-10-07 PROCEDURE — 94664 DEMO&/EVAL PT USE INHALER: CPT

## 2023-10-07 PROCEDURE — 25010000002 METHYLPREDNISOLONE PER 125 MG: Performed by: INTERNAL MEDICINE

## 2023-10-07 PROCEDURE — 85025 COMPLETE CBC W/AUTO DIFF WBC: CPT | Performed by: INTERNAL MEDICINE

## 2023-10-07 PROCEDURE — 25010000002 FUROSEMIDE PER 20 MG: Performed by: HOSPITALIST

## 2023-10-07 PROCEDURE — 94669 MECHANICAL CHEST WALL OSCILL: CPT

## 2023-10-07 PROCEDURE — 71045 X-RAY EXAM CHEST 1 VIEW: CPT

## 2023-10-07 RX ORDER — METOLAZONE 5 MG/1
10 TABLET ORAL ONCE
Status: COMPLETED | OUTPATIENT
Start: 2023-10-07 | End: 2023-10-07

## 2023-10-07 RX ORDER — METHYLPREDNISOLONE SODIUM SUCCINATE 125 MG/2ML
60 INJECTION, POWDER, LYOPHILIZED, FOR SOLUTION INTRAMUSCULAR; INTRAVENOUS EVERY 12 HOURS
Status: DISCONTINUED | OUTPATIENT
Start: 2023-10-07 | End: 2023-10-09

## 2023-10-07 RX ADMIN — ISODIUM CHLORIDE 3 ML: 0.03 SOLUTION RESPIRATORY (INHALATION) at 16:17

## 2023-10-07 RX ADMIN — BENZONATATE 200 MG: 100 CAPSULE ORAL at 21:55

## 2023-10-07 RX ADMIN — METOLAZONE 10 MG: 5 TABLET ORAL at 12:44

## 2023-10-07 RX ADMIN — BUDESONIDE AND FORMOTEROL FUMARATE DIHYDRATE 2 PUFF: 160; 4.5 AEROSOL RESPIRATORY (INHALATION) at 07:52

## 2023-10-07 RX ADMIN — GUAIFENESIN 600 MG: 600 TABLET, EXTENDED RELEASE ORAL at 20:55

## 2023-10-07 RX ADMIN — Medication 1000 UNITS: at 09:32

## 2023-10-07 RX ADMIN — FUROSEMIDE 40 MG: 10 INJECTION, SOLUTION INTRAMUSCULAR; INTRAVENOUS at 06:58

## 2023-10-07 RX ADMIN — BUDESONIDE AND FORMOTEROL FUMARATE DIHYDRATE 2 PUFF: 160; 4.5 AEROSOL RESPIRATORY (INHALATION) at 21:26

## 2023-10-07 RX ADMIN — MULTIPLE VITAMINS W/ MINERALS TAB 1 TABLET: TAB at 20:55

## 2023-10-07 RX ADMIN — MULTIPLE VITAMINS W/ MINERALS TAB 1 TABLET: TAB at 09:31

## 2023-10-07 RX ADMIN — FUROSEMIDE 40 MG: 10 INJECTION, SOLUTION INTRAMUSCULAR; INTRAVENOUS at 15:09

## 2023-10-07 RX ADMIN — IPRATROPIUM BROMIDE AND ALBUTEROL SULFATE 3 ML: 2.5; .5 SOLUTION RESPIRATORY (INHALATION) at 07:47

## 2023-10-07 RX ADMIN — APIXABAN 2.5 MG: 2.5 TABLET, FILM COATED ORAL at 03:32

## 2023-10-07 RX ADMIN — CITALOPRAM 10 MG: 10 TABLET, FILM COATED ORAL at 09:31

## 2023-10-07 RX ADMIN — IPRATROPIUM BROMIDE AND ALBUTEROL SULFATE 3 ML: 2.5; .5 SOLUTION RESPIRATORY (INHALATION) at 12:25

## 2023-10-07 RX ADMIN — METOPROLOL TARTRATE 100 MG: 50 TABLET, FILM COATED ORAL at 09:31

## 2023-10-07 RX ADMIN — FUROSEMIDE 40 MG: 10 INJECTION, SOLUTION INTRAMUSCULAR; INTRAVENOUS at 21:55

## 2023-10-07 RX ADMIN — BENZONATATE 200 MG: 100 CAPSULE ORAL at 03:32

## 2023-10-07 RX ADMIN — ACETAMINOPHEN 650 MG: 325 TABLET, FILM COATED ORAL at 11:54

## 2023-10-07 RX ADMIN — METOPROLOL TARTRATE 100 MG: 50 TABLET, FILM COATED ORAL at 22:03

## 2023-10-07 RX ADMIN — FERROUS SULFATE TAB 325 MG (65 MG ELEMENTAL FE) 325 MG: 325 (65 FE) TAB at 09:31

## 2023-10-07 RX ADMIN — METHYLPREDNISOLONE SODIUM SUCCINATE 60 MG: 125 INJECTION, POWDER, FOR SOLUTION INTRAMUSCULAR; INTRAVENOUS at 12:44

## 2023-10-07 RX ADMIN — POTASSIUM CHLORIDE 20 MEQ: 750 TABLET, EXTENDED RELEASE ORAL at 09:32

## 2023-10-07 RX ADMIN — BENZONATATE 200 MG: 100 CAPSULE ORAL at 11:54

## 2023-10-07 RX ADMIN — GUAIFENESIN 600 MG: 600 TABLET, EXTENDED RELEASE ORAL at 09:31

## 2023-10-07 RX ADMIN — APIXABAN 2.5 MG: 2.5 TABLET, FILM COATED ORAL at 17:18

## 2023-10-07 RX ADMIN — ALBUTEROL SULFATE 2.5 MG: 2.5 SOLUTION RESPIRATORY (INHALATION) at 16:20

## 2023-10-07 RX ADMIN — CETIRIZINE HYDROCHLORIDE 10 MG: 10 TABLET ORAL at 09:32

## 2023-10-07 RX ADMIN — Medication 1 CAPSULE: at 09:31

## 2023-10-07 RX ADMIN — CEFTRIAXONE SODIUM 1000 MG: 1 INJECTION, POWDER, FOR SOLUTION INTRAMUSCULAR; INTRAVENOUS at 17:18

## 2023-10-07 RX ADMIN — IPRATROPIUM BROMIDE AND ALBUTEROL SULFATE 3 ML: 2.5; .5 SOLUTION RESPIRATORY (INHALATION) at 21:16

## 2023-10-07 RX ADMIN — ISODIUM CHLORIDE 3 ML: 0.03 SOLUTION RESPIRATORY (INHALATION) at 07:45

## 2023-10-07 RX ADMIN — AZITHROMYCIN 250 MG: 250 TABLET, FILM COATED ORAL at 09:31

## 2023-10-07 RX ADMIN — ISODIUM CHLORIDE 3 ML: 0.03 SOLUTION RESPIRATORY (INHALATION) at 21:21

## 2023-10-07 NOTE — PROGRESS NOTES
LOS: 2 days   Patient Care Team:  Matt Rose MD as PCP - General (Family Medicine)  Marcie Robbins MD as Consulting Physician (Cardiology)  Nilesh Danielle MD as Consulting Physician (Urology)  Lina Morris [facility] as Pharmacist  Lev Mckeon PharmD as Pharmacist (Pharmacy)  Rogelio Tucker MD as Consulting Physician (Pulmonary Disease)    Chief Complaint:     F/u chf    Interval History:     She still has a cough.  She is still mildly short winded.  Her orthopnea is mildly better.  She has no chest pain.  She has no dizziness and no nausea.    Objective   Vital Signs  Temp:  [97.7 øF (36.5 øC)-98.6 øF (37 øC)] 97.7 øF (36.5 øC)  Heart Rate:  [] 97  Resp:  [16-20] 16  BP: ()/(67-76) 98/67    Intake/Output Summary (Last 24 hours) at 10/7/2023 1520  Last data filed at 10/7/2023 1259  Gross per 24 hour   Intake 480 ml   Output 1350 ml   Net -870 ml       Comfortable NAD  Neck supple, no JVD or thyromegaly appreciated  S1/S2 RRR, no m/r/g  Lungs expiratory rales at bases, normal effort  Abdomen S/NT/ND (+) BS, no HSM appreciated  Extremities warm, no clubbing, cyanosis, or edema  No visible or palpable skin lesions  A/Ox4, mood and affect appropriate    Results Review:      Results from last 7 days   Lab Units 10/07/23  0436 10/06/23  0359 10/05/23  1859 10/05/23  0352   SODIUM mmol/L 139 142  --  144   POTASSIUM mmol/L 3.6 4.8 3.9 3.1*   CHLORIDE mmol/L 95* 102  --  102   CO2 mmol/L 30.6* 29.9*  --  31.5*   BUN mg/dL 22 18  --  24*   CREATININE mg/dL 0.97 0.94  --  1.01*   GLUCOSE mg/dL 111* 114*  --  121*   CALCIUM mg/dL 9.9* 9.7*  --  9.5     Results from last 7 days   Lab Units 10/04/23  1953   HSTROP T ng/L 31*     Results from last 7 days   Lab Units 10/07/23  0436 10/06/23  0359 10/05/23  0352   WBC 10*3/mm3 11.48* 12.57* 12.21*   HEMOGLOBIN g/dL 11.4* 10.5* 10.0*   HEMATOCRIT % 34.3 31.4* 30.0*   PLATELETS 10*3/mm3 281 265 265                       I reviewed the patient's new clinical  results.  I personally viewed and interpreted the patient's EKG/Telemetry data        Medication Review:   apixaban, 2.5 mg, Oral, Q12H  azithromycin, 250 mg, Oral, Q24H  budesonide-formoterol, 2 puff, Inhalation, BID - RT  cefTRIAXone, 1,000 mg, Intravenous, Q24H  cetirizine, 10 mg, Oral, Daily  cholecalciferol, 1,000 Units, Oral, Daily  citalopram, 10 mg, Oral, Daily  ferrous sulfate, 325 mg, Oral, Daily With Breakfast  furosemide, 40 mg, Intravenous, Q8H  guaiFENesin, 600 mg, Oral, BID  ipratropium-albuterol, 3 mL, Nebulization, Q6H While Awake - RT  lactobacillus acidophilus, 1 capsule, Oral, Daily  methylPREDNISolone sodium succinate, 60 mg, Intravenous, Q12H  metoprolol tartrate, 100 mg, Oral, Q12H  multivitamin with minerals, 1 tablet, Oral, BID  potassium chloride, 20 mEq, Oral, Daily  sodium chloride, 3 mL, Nebulization, TID             Assessment & Plan       Acute on chronic diastolic heart failure    Primary hypertension    Chronic coronary artery disease    Paroxysmal atrial fibrillation    Acute on chronic diastolic congestive heart failure      Acute on chronic hypoxic respiratory failure. Pulmonology following, CT done 10/6/2023 showed multifocal patchy groundglass infiltrates in both lungs.  Acute on chronic diastolic CHF.  Needs further diuresis.  We will continue furosemide 40 mg IV Q 8h today. Renal function stable.  Mitral regurgitation, moderate in the past. Stable on echo this AM  Persistent atrial fibrillation-overall heart rate stable.  Continue apixaban.   Bronchiectasis, on chronic azithromycin  Coronary artery disease. No anginal symptoms  Hypertension Will increase metoprolol slightly to help with rate control.  Multifactorial anemia with stable hemoglobin today    Overall she is making slow improvement.  We will walk her in the hallway and use incentive spirometer.  Her chest x-ray looks slightly better.  She still needs IV Lasix so we will continue this.  No other testing at this time.   We will follow.    We will see what her heart rate does when she ambulates.    Liyah Cheng MD  10/07/23  15:20 EDT

## 2023-10-07 NOTE — PROGRESS NOTES
Name: Agnieszka Rizzo ADMIT: 10/4/2023   : 1930  PCP: Matt Rose MD    MRN: [id number] LOS: 2 days   AGE/SEX: 89 y.o. female  ROOM: [facility]     Subjective   Subjective   Patient is seen at bedside, no new complaints.       Objective   Objective   Vital Signs  Temp:  [97.7 øF (36.5 øC)-98.6 øF (37 øC)] 97.7 øF (36.5 øC)  Heart Rate:  [] 106  Resp:  [16-20] 16  BP: ()/(67-76) 98/67  SpO2:  [94 %-100 %] 100 %  on  Flow (L/min):  [2] 2;   Device (Oxygen Therapy): nasal cannula  Body mass index is 21.69 kg/mý.  Physical Exam      General, awake and alert.  Head and ENT, normocephalic and atraumatic.  Lungs, symmetric expansion, equal air entry bilaterally.  Heart, regular rate and rhythm.  Abdomen, soft and nontender.  Extremities, no clubbing or cyanosis.  Neuro, no focal deficits.  Skin: Warm and no rash.  Psych, normal mood and affect.  Musculoskeletal, joint examination is grossly normal.    Copied text material from yesterday's note has been reviewed for appropriate changes and remains accurate as of 10/07/23.    Results Review     I reviewed the patient's new clinical results.  Results from last 7 days   Lab Units 10/07/23  0436 10/06/23  0359 10/05/23  0352 10/04/23  1919   WBC 10*3/mm3 11.48* 12.57* 12.21* 13.91*   HEMOGLOBIN g/dL 11.4* 10.5* 10.0* 9.6*   PLATELETS 10*3/mm3 281 265 265 278     Results from last 7 days   Lab Units 10/07/23  0436 10/06/23  0359 10/05/23  1859 10/05/23  0352 10/04/23  1953   SODIUM mmol/L 139 142  --  144 140   POTASSIUM mmol/L 3.6 4.8 3.9 3.1* 3.4*   CHLORIDE mmol/L 95* 102  --  102 99   CO2 mmol/L 30.6* 29.9*  --  31.5* 27.0   BUN mg/dL 22 18  --  24* 31*   CREATININE mg/dL 0.97 0.94  --  1.01* 1.14*   GLUCOSE mg/dL 111* 114*  --  121* 135*   EGFR mL/min/1.73 54.6* 56.7*  --  52.0* 45.0*     Results from last 7 days   Lab Units 10/07/23  0436 10/06/23  0359 10/04/23  1953   ALBUMIN g/dL 3.7 3.6 3.7   BILIRUBIN mg/dL 0.8 0.7 0.4   ALK PHOS U/L 119* 106  "114   AST (SGOT) U/L 39* 27 21   ALT (SGPT) U/L 25 16 13     Results from last 7 days   Lab Units 10/07/23  0436 10/06/23  0359 10/05/23  0352 10/04/23  1953   CALCIUM mg/dL 9.9* 9.7* 9.5 9.3   ALBUMIN g/dL 3.7 3.6  --  3.7     Results from last 7 days   Lab Units 10/07/23  0436 10/05/23  0352   PROCALCITONIN ng/mL 0.21 0.38*     No results found for: \"HGBA1C\", \"POCGLU\"    XR Chest 1 View    Result Date: 10/7/2023  Cardiomegaly and increasing infiltrates are noted as described above.       I have personally reviewed all medications:  Scheduled Medications  apixaban, 2.5 mg, Oral, Q12H  azithromycin, 250 mg, Oral, Q24H  budesonide-formoterol, 2 puff, Inhalation, BID - RT  cefTRIAXone, 1,000 mg, Intravenous, Q24H  cetirizine, 10 mg, Oral, Daily  cholecalciferol, 1,000 Units, Oral, Daily  citalopram, 10 mg, Oral, Daily  ferrous sulfate, 325 mg, Oral, Daily With Breakfast  furosemide, 40 mg, Intravenous, Q8H  guaiFENesin, 600 mg, Oral, BID  ipratropium-albuterol, 3 mL, Nebulization, Q6H While Awake - RT  lactobacillus acidophilus, 1 capsule, Oral, Daily  methylPREDNISolone sodium succinate, 60 mg, Intravenous, Q12H  metoprolol tartrate, 100 mg, Oral, Q12H  multivitamin with minerals, 1 tablet, Oral, BID  potassium chloride, 20 mEq, Oral, Daily  sodium chloride, 3 mL, Nebulization, TID    Infusions   Diet  Diet: Cardiac Diets; Healthy Heart (2-3 Na+); Texture: Regular Texture (IDDSI 7); Fluid Consistency: Thin (IDDSI 0)    I have personally reviewed:  [x]  Laboratory   [x]  Microbiology   [x]  Radiology   [x]  EKG/Telemetry  [x]  Cardiology/Vascular   []  Pathology    []  Records       Assessment/Plan     Active Hospital Problems    Diagnosis  POA    **Acute on chronic diastolic heart failure [I50.33]  Yes    Acute on chronic diastolic congestive heart failure [I50.33]  Yes    Paroxysmal atrial fibrillation [I48.0]  Yes    Primary hypertension [I10]  Yes    Chronic coronary artery disease [I25.10]  Yes      Resolved " Hospital Problems   No resolved problems to display.       89 y.o. female admitted with Acute on chronic diastolic heart failure.    Acute exacerbation of CHF (congestive heart failure)  Continue IV lasix , follow-up with cardiology recommendations.  Consult nephrology, for diuretic management, follow recommendations.     Acute on chronic respiratory failure  Asthma  Bronchopneumonia with bilateral pulmonary infiltrate  Follow-up with pulmonary recommendations.  Patient is currently on Rocephin and Zithromax.  Suspected organizing pneumonia versus aspiration pneumonia during bronchoscopy      Proximal atrial fibrillation  Chronic  AC Eliquis- last dose this evening   RC metoprolol     Primary hypertension  Chronic   Home metoprolol     Iron deficiency anemia  Monitor hemoglobin trend.        I discussed the patient's findings and my recommendations with patient and family.     VTE Prophylaxis - Eliquis (home med).  Code Status - DNR.      Skinny Serna MD  [facility]  10/07/23  17:19 EDT

## 2023-10-07 NOTE — PROGRESS NOTES
PROGRESS NOTE  Patient Name: Agnieszka Rizzo  Age/Sex: 89 y.o. female  : 1930  MRN: [id number]    Date of Admission: 10/4/2023  Date of Encounter Visit: 10/07/23   LOS: 2 days   Patient Care Team:  Matt Rose MD as PCP - General (Family Medicine)  Marcie Robbins MD as Consulting Physician (Cardiology)  Nilesh Danielle MD as Consulting Physician (Urology)  Lina Morris RPH as Pharmacist  Lev Mckeon PharmD as Pharmacist (Pharmacy)  Rogelio Tucker MD as Consulting Physician (Pulmonary Disease)    Chief Complaint: Bronchopneumonia with bilateral patchy pulmonary infiltrate    Hospital course: Patient came in respiratory failure, she has congestive heart failure/lower extremity edema patient reported that she was having edema more in the central part of her body.  No fever or chills, currently on Rocephin, she takes Zithromax regularly at home.  Patient has diastolic congestive heart failure.  She is on 2 L nasal cannula, but she still having some cough and some shortness of breath.  The culture from her recent bronchoscopy has been inconclusive  She did have the CAT scan done yesterday that showed bilateral persistent patchy areas of groundglass opacities         REVIEW OF SYSTEMS:   CONSTITUTIONAL: no fever or chills  CARDIOVASCULAR: No chest pain, chest pressure or chest discomfort. No palpitations or edema.   RESPIRATORY: Positive for cough and wheezing   GASTROINTESTINAL: No anorexia, nausea, vomiting or diarrhea. No abdominal pain or blood.   HEMATOLOGIC: No bleeding or bruising.     Ventilator/Non-Invasive Ventilation Settings (From admission, onward)      2 L/min nasal cannula oxygen              Vital Signs  Temp:  [97.9 øF (36.6 øC)-99.3 øF (37.4 øC)] 97.9 øF (36.6 øC)  Heart Rate:  [] 110  Resp:  [16-20] 18  BP: (113-119)/(63-76) 113/70  SpO2:  [94 %-100 %] 96 %  on  Flow (L/min):  [2] (P) 2 Device (Oxygen Therapy): (P) nasal cannula    Intake/Output Summary (Last 24 hours) at  "10/7/2023 1125  Last data filed at 10/7/2023 0854  Gross per 24 hour   Intake 480 ml   Output 1350 ml   Net -870 ml     Flowsheet Rows      Flowsheet Row First Filed Value   Admission Height 160 cm (63\") Documented at 10/04/2023 1908   Admission Weight 55.8 kg (123 lb) Documented at 10/04/2023 1908          Body mass index is 21.69 kg/mý.      10/06/23  0545 10/06/23  1035 10/07/23  0339   Weight: 56.1 kg (123 lb 11.2 oz) 55.8 kg (123 lb) 55.5 kg (122 lb 6.4 oz)       Physical Exam:  GEN:  No acute distress, alert, cooperative, well developed   EYES:   Sclerae clear. No icterus. PERRL. Normal EOM  ENT:   External ears/nose normal, no oral lesions, no thrush, mucous membranes moist  NECK:  Supple, midline trachea, no JVD  LUNGS: Normal chest on inspection,positive crackles bilaterally, minimal expiratory wheezes, scattered rhonchi, breathing was slightly labored especially when trying to talk.  .   CV:  Regular rhythm and rate. Normal S1/S2. No murmurs, gallops, or rubs noted.  ABD:  Soft, nontender and nondistended. Normal bowel sounds. No guarding  EXT:  Moves all extremities well. No cyanosis. No redness. No edema.   Skin: Dry, intact, no bleeding    Results Review:        Results from last 7 days   Lab Units 10/07/23  0436 10/06/23  0359 10/05/23  1859 10/05/23  0352 10/04/23  1953   SODIUM mmol/L 139 142  --  144 140   POTASSIUM mmol/L 3.6 4.8 3.9 3.1* 3.4*   CHLORIDE mmol/L 95* 102  --  102 99   CO2 mmol/L 30.6* 29.9*  --  31.5* 27.0   BUN mg/dL 22 18  --  24* 31*   CREATININE mg/dL 0.97 0.94  --  1.01* 1.14*   CALCIUM mg/dL 9.9* 9.7*  --  9.5 9.3   AST (SGOT) U/L 39* 27  --   --  21   ALT (SGPT) U/L 25 16  --   --  13   ANION GAP mmol/L 13.4 10.1  --  10.5 14.0   ALBUMIN g/dL 3.7 3.6  --   --  3.7     Results from last 7 days   Lab Units 10/04/23  1953   HSTROP T ng/L 31*         Results from last 7 days   Lab Units 10/04/23  1953   PROBNP pg/mL 8,193.0*     Results from last 7 days   Lab Units 10/07/23  0436 " "10/06/23  0359 10/05/23  0352 10/04/23  1919   WBC 10*3/mm3 11.48* 12.57* 12.21* 13.91*   HEMOGLOBIN g/dL 11.4* 10.5* 10.0* 9.6*   HEMATOCRIT % 34.3 31.4* 30.0* 29.0*   PLATELETS 10*3/mm3 281 265 265 278   MCV fL 96.3 97.8* 95.8 97.0   NEUTROPHIL % % 64.1  --   --  77.5*   LYMPHOCYTE % % 19.7  --   --  10.6*   MONOCYTES % % 11.8  --   --  10.1   EOSINOPHIL % % 2.1  --   --  0.8   BASOPHIL % % 0.7  --   --  0.3   IMM GRAN % % 1.6*  --   --  0.7*                   Invalid input(s): \"LDLCALC\"          No results found for: \"POCGLU\"  Results from last 7 days   Lab Units 10/07/23  0436 10/05/23  0352   PROCALCITONIN ng/mL 0.21 0.38*         Results from last 7 days   Lab Units 10/04/23  2132   NITRITE UA  Negative   WBC UA /HPF 0-2   BACTERIA UA /HPF None Seen   SQUAM EPITHEL UA /HPF 0-2     Results from last 7 days   Lab Units 10/04/23  2243   COVID19  Not Detected   ADENOVIRUS DETECTION BY PCR  Not Detected   CORONAVIRUS 229E  Not Detected   CORONAVIRUS HKU1  Not Detected   CORONAVIRUS NL63  Not Detected   CORONAVIRUS OC43  Not Detected   HUMAN METAPNEUMOVIRUS  Not Detected   HUMAN RHINOVIRUS/ENTEROVIRUS  Not Detected   INFLUENZA B PCR  Not Detected   PARAINFLUENZA 1  Not Detected   PARAINFLUENZA VIRUS 2  Not Detected   PARAINFLUENZA VIRUS 3  Not Detected   PARAINFLUENZA VIRUS 4  Not Detected   BORDETELLA PERTUSSIS PCR  Not Detected   BORDETELLA PARAPERTUSSIS PCR  Not Detected   CHLAMYDOPHILA PNEUMONIAE PCR  Not Detected   MYCOPLAMA PNEUMO PCR  Not Detected   RSV, PCR  Not Detected               Imaging:   Imaging Results (All)       Procedure Component Value Units Date/Time              I reviewed the patient's new clinical results.  I personally viewed and interpreted the patient's imaging results:        Medication Review:   apixaban, 2.5 mg, Oral, Q12H  azithromycin, 250 mg, Oral, Q24H  budesonide-formoterol, 2 puff, Inhalation, BID - RT  cefTRIAXone, 1,000 mg, Intravenous, Q24H  cetirizine, 10 mg, Oral, " Daily  cholecalciferol, 1,000 Units, Oral, Daily  citalopram, 10 mg, Oral, Daily  ferrous sulfate, 325 mg, Oral, Daily With Breakfast  furosemide, 40 mg, Intravenous, Q8H  guaiFENesin, 600 mg, Oral, BID  ipratropium-albuterol, 3 mL, Nebulization, Q6H While Awake - RT  lactobacillus acidophilus, 1 capsule, Oral, Daily  metOLazone, 10 mg, Oral, Once  metoprolol tartrate, 100 mg, Oral, Q12H  multivitamin with minerals, 1 tablet, Oral, BID  potassium chloride, 20 mEq, Oral, Daily  sodium chloride, 3 mL, Nebulization, TID             ASSESSMENT:   Acute on chronic hypoxemic respiratory failure, home time oxygen at 2 L/min mainly at night  Bronchopneumonia with bilateral pulmonary infiltrate, patient is on Rocephin in addition to her chronic Zithromax regiment  Acute exacerbation of heart failure with preserved EF  Atrial fibrillation on anticoagulation with RVR  Bronchiectasis  History of Aspergillus  Coronary artery disease  Leukocytosis  Suspected organizing pneumonia versus aspiration pneumonia during bronchoscopy    PLAN:  Currently on antibiotic with Rocephin, patient is on diuretics with furosemide every 8 hours with 40 mg and her renal function seems to be holding on pretty well with a creatinine around 0.97.  White count is trending down slowly but it was not that elevated to start with, procalcitonin is down but was not very elevated as well, respiratory panel is negative, respiratory cultures so far no growth  This is unlikely to remaining infection, it will may take several days however the finding on the CAT scan are not typical of popliteal infection, patient might have organizing pneumonia and in that case she might respond favorably to systemic steroids.  Since initial results from the bronchoscopy are negative and patient is now doing better we will go ahead and initiate treatment with steroids for possible organizing pneumonia while continuing to follow-up on the AFB culture from her bronchoscopy  Other  potential differential can be aspiration pneumonia at the time of the bronchoscopy that is manifesting with bilateral pulmonary infiltrate      Summary of recommendations:  Continue with Rocephin and add steroid    Discussed with patient  Labs/Notes/films were independently reviewed and pertinent results are summarized above  The copied texts in this note were reviewed and they are accurate as of 10/07/23    Disposition: Per primary team    Naman Mckenzie MD  10/07/23  11:25 EDT             Dictated utilizing Dragon dictation

## 2023-10-07 NOTE — PLAN OF CARE
Goal Outcome Evaluation:  Plan of Care Reviewed With: patient        Progress: no change  Outcome Evaluation: Patient has been alert and oriented. Complaints of irritating cough. PRN Tylenol, Robitussin and Tessalon given with positive effects. Patient up to chair and ambulating in room with out difficulty all shift. Remains on 2L via NC. Continues in AFib, with heart rate elevating during exertion. Vital signs stable. Voiding well. Output greater than intake. Baseline with O2 demands. Weight is down. Discharge plans to be determined. Call light in reach. Safety maitnained.

## 2023-10-07 NOTE — PLAN OF CARE
Goal Outcome Evaluation:  Plan of Care Reviewed With: patient        Progress: no change  Outcome Evaluation: Pt AOX4. Pt rested well between care. Up to BSC. Good urine output. Cont on 2l of O2. Pt c/o cough treating with PRN meds per MAR. Afib rate controlled. Cont to monitor, safety maintained.  Diuretic in Use: Lasix IV  Response to Diuretics (Output greater than intake): greater output  Daily Weight (up or down): 3lbs   O2 Requirements: baselene 2L  Functional Status (Activity level, tolerance and respiratory symptoms): at baseline  Discharge Plans:  TBD

## 2023-10-07 NOTE — CONSULTS
INITIAL CONSULT NOTE      Name: Agnieszka Rizzo ADMIT: 10/4/2023   : 1930  PCP: Matt Rose MD    MRN: [id number] LOS: 2 days   AGE/SEX: 89 y.o. female  ROOM: N438/1     Date of Service: 10/7/2023                        Reason for Consult:         Ckd 3, chf, volume overload      History of Present Illness:       Patient is a 89-year-old woman who has history of coronary artery disease, atrial fibrillation, history of congestive heart failure, ejection fraction 56%, LVH, diastolic dysfunction, right ventricular systolic pressure 49 mmHg, CKD stage II-III, baseline creatinine variable from 0.9-1.26,GFR around 56 mL/min, urine completely bland, no hematuria no proteinuria no pyuria, CKD likely secondary to atherosclerotic disease, presented to emergency room with complaints of shortness of breath, peripheral edema, chest x-ray showed pulmonary edema, patient was restarted on IV diuretics, cardiology and pulmonary consulted, nephrology consulted for volume management and baseline CKD.Patient is not on any NSAIDs no recent records no angiotensin receptor blockers currently on loop diuretic Lasix.   labs reviewed on 2023 creatinine 0.97 BUN 22 potassium 3.6 CO2 30.6, calcium 9.9, WBCs 11.48 hemoglobin 11.4 platelets 281.  Subsequently nephrology consultation has been requested for management of volume with CKD.         Review of Systems:         Constitutional: weakness.  HEENT:  No headache, otalgia, itchy eyes, nasal discharge or sore throat.  Cardiac:  Shortness of breath somewhat better  Chest:  No cough, phlegm or wheezing.  Abdomen:  No abdominal pain, nausea or vomiting.  Neuro:  No focal weakness, abnormal movements or seizure-like activity.  :   No hematuria, no pyuria, no dysuria, no flank pain.  Extremities:  Edema.  ROS was otherwise negative except as mentioned in the [place].        Past History/Allergies?Social History:     Past Medical History:   Diagnosis Date    Aspergillus      Asthma     Atrial fibrillation     chronic    Atrial flutter     Bronchiectasis     C. difficile diarrhea 03/11/2017    CAD (coronary artery disease)     nonobstructive    Chronic diastolic CHF (congestive heart failure)     Colitis     Cough     Cryoglobulinemia     Dyspnea on exertion     Fall     Hyperlipidemia     Hypertension     Hyponatremia     Hypoxia     Infectious viral hepatitis     AGE 13    Left shoulder pain     Leg swelling     Lesion of lung     Malignant hyperthermia due to anesthesia     Mild tricuspid regurgitation     MR (mitral regurgitation)     mild    MVP (mitral valve prolapse)     Permanent atrial fibrillation     Pneumonia with the fungal infection aspergillosis 01/06/2017    Pneumothorax     SOB (shortness of breath)     UTI (urinary tract infection)     Wheeze     mild         Past Surgical History :     Past Surgical History:   Procedure Laterality Date    BRONCHOSCOPY N/A 11/12/2016    Procedure: BRONCHOSCOPY WITH FLUORO, BRUSHINGS, BAL, AND BIOPSIES;  Surgeon: Rogelio Tucker MD;  Location: [facility] ENDOSCOPY;  Service:     BRONCHOSCOPY Bilateral 06/03/2017    Procedure: BRONCHOSCOPY with BAL ;  Surgeon: Sung King MD;  Location: [facility] ENDOSCOPY;  Service:     BRONCHOSCOPY N/A 12/17/2019    Procedure: BRONCHOSCOPY WITH WASHINGS;  Surgeon: Rogelio Tucker MD;  Location: [facility] ENDOSCOPY;  Service: Pulmonary    BRONCHOSCOPY N/A 07/15/2022    Procedure: BRONCHOSCOPY;  Surgeon: Rogelio Tucker MD;  Location: [facility] ENDOSCOPY;  Service: Pulmonary;  Laterality: N/A;  Pre: Pneumonia  Post: Pneumonia    BRONCHOSCOPY N/A 10/2/2023    Procedure: BRONCHOSCOPY WITH BRONCHIAL AVEOLAR LAVAGE;  Surgeon: Rogelio Tucker MD;  Location: [facility] ENDOSCOPY;  Service: Pulmonary;  Laterality: N/A;  PRE:BRONCHIECTASIS /   POST: SAME    CATARACT EXTRACTION EXTRACAPSULAR W/ INTRAOCULAR LENS IMPLANTATION      COLONOSCOPY      2013    D & C WITH SUCTION      HYSTERECTOMY      KNEE ARTHROSCOPY Left     Partial           Family History:     Family History   Problem Relation Age of Onset    Hypertension Mother     Stroke Mother     Heart disease Father     Hypertension Father     Cancer Brother     Cancer Son           Social History:     Social History     Tobacco Use    Smoking status: Never    Smokeless tobacco: Never    Tobacco comments:     caffiene daily   Substance Use Topics    Alcohol use: Not Currently     Alcohol/week: 2.0 standard drinks of alcohol     Types: 1 Glasses of wine, 1 Shots of liquor per week          Allergies:     Allergies   Allergen Reactions    Amlodipine Besylate Swelling    Aspirin GI Intolerance     Caused bleeding ulcers    Bactrim [Sulfamethoxazole-Trimethoprim] Nausea And Vomiting    Erythromycin Unknown (See Comments)     Patient does not recall type of reaction.    Levaquin [Levofloxacin] Unknown (See Comments)     Nausea      Nitrofurantoin Nausea Only and Nausea And Vomiting    Ramipril Other (See Comments)     Cough         Home meds:     Medications Prior to Admission   Medication Sig Dispense Refill Last Dose    acetaminophen (TYLENOL) 325 MG tablet Take 2 tablets by mouth Every 4 (Four) Hours As Needed for Moderate Pain.   10/4/2023    albuterol sulfate  (90 Base) MCG/ACT inhaler Inhale 2 puffs Every 6 (Six) Hours As Needed for Wheezing or Shortness of Air. 8 g 11 10/3/2023    apixaban (ELIQUIS) 2.5 MG tablet tablet Take 1 tablet by mouth Every 12 (Twelve) Hours. Indications: Other - full anticoagulation 180 tablet 3 10/4/2023    benzonatate (TESSALON) 200 MG capsule Take 1 capsule by mouth 3 (Three) Times a Day As Needed. Indications: Cough   10/4/2023    cholecalciferol (VITAMIN D3) 25 MCG (1000 UT) tablet Take 1 tablet by mouth Daily.   10/4/2023    citalopram (CeleXA) 10 MG tablet Take 1 tablet by mouth Daily. 90 tablet 2 10/4/2023    FeroSul 325 (65 Fe) MG tablet TAKE ONE TABLET BY MOUTH DAILY WITH BREAKFAST (Patient taking differently: Take 1 tablet by mouth Daily.) 90  tablet 0 10/4/2023    fexofenadine (ALLEGRA) 180 MG tablet Take 1 tablet by mouth Daily.   10/4/2023    fluticasone (FLONASE) 50 MCG/ACT nasal spray 2 sprays into the nostril(s) as directed by provider At Night As Needed for Allergies.   10/4/2023    fluticasone-salmeterol (ADVAIR HFA) 115-21 MCG/ACT inhaler Inhale 2 puffs 2 (Two) Times a Day.   10/4/2023    guaiFENesin (MUCINEX) 600 MG 12 hr tablet Take 1 tablet by mouth 2 (Two) Times a Day.   10/4/2023    Lactobacillus (FLORAJEN ACIDOPHILUS) capsule Take 1 tablet by mouth Daily.   10/4/2023    metoprolol tartrate (LOPRESSOR) 100 MG tablet Take 1 tablet by mouth Every Morning for 360 days. 90 tablet 3 10/4/2023    Multiple Vitamins-Minerals (PRESERVISION AREDS PO) Take 1 tablet by mouth 2 (Two) Times a Day.   10/4/2023    potassium chloride (K-DUR,KLOR-CON) 20 MEQ CR tablet Take 1 tablet by mouth Daily. 90 tablet 3 10/4/2023    torsemide (DEMADEX) 20 MG tablet Take 2 tablets by mouth 2 (Two) Times a Day for 360 days. 360 tablet 3 10/4/2023    azithromycin (ZITHROMAX) 250 MG tablet Take 1 tablet by mouth Daily.   Unknown    ipratropium-albuterol (DUO-NEB) 0.5-2.5 mg/3 ml nebulizer Take 3 mL by nebulization 2 (Two) Times a Day.   Unknown    metoprolol tartrate (LOPRESSOR) 50 MG tablet Take 1 tablet by mouth Every Night.       sodium chloride 0.9 % nebulizer solution Take 3 mL by nebulization 2 (Two) Times a Day.   Unknown         Scheduled meds:   apixaban, 2.5 mg, Oral, Q12H  azithromycin, 250 mg, Oral, Q24H  budesonide-formoterol, 2 puff, Inhalation, BID - RT  cefTRIAXone, 1,000 mg, Intravenous, Q24H  cetirizine, 10 mg, Oral, Daily  cholecalciferol, 1,000 Units, Oral, Daily  citalopram, 10 mg, Oral, Daily  ferrous sulfate, 325 mg, Oral, Daily With Breakfast  furosemide, 40 mg, Intravenous, Q8H  guaiFENesin, 600 mg, Oral, BID  ipratropium-albuterol, 3 mL, Nebulization, Q6H While Awake - RT  lactobacillus acidophilus, 1 capsule, Oral, Daily  metoprolol tartrate, 100  "mg, Oral, Q12H  multivitamin with minerals, 1 tablet, Oral, BID  potassium chloride, 20 mEq, Oral, Daily  sodium chloride, 3 mL, Nebulization, TID          Objectives:     tMax 24 hrs: Temp (24hrs), Av.6 øF (37 øC), Min:97.9 øF (36.6 øC), Max:99.3 øF (37.4 øC)      Vitals Ranges:   Temp:  [97.9 øF (36.6 øC)-99.3 øF (37.4 øC)] 97.9 øF (36.6 øC)  Heart Rate:  [] 110  Resp:  [16-20] 18  BP: (113-119)/(63-76) 113/70    Intake and Output Last 3 Shifts:   I/O last 3 completed shifts:  In: 480 [P.O.:480]  Out: 2350 [Urine:2350]      Exam:     /70 (BP Location: Right arm, Patient Position: Lying)   Pulse 110   Temp 97.9 øF (36.6 øC) (Oral)   Resp 18   Ht 160 cm (62.99\")   Wt 55.5 kg (122 lb 6.4 oz)   SpO2 96%   BMI 21.69 kg/mý     General Appearance:  Appears chronically ill looking    Head:    Normocephalic, atraumatic   Eyes:     EOM's intact, sclerae anicteric        Ears:    TMs not observed   Nose:   Patent without discharge   Neck:   Supple, JVD   Lungs:   Bilateral crackles   Chest wall:    No tenderness   Heart:    Regular rate and rhythm, S1 and S2 normal, no   rub    or gallop   Abdomen:     Soft, nontender, nondistended,  no masses, no organomegaly   Extremities:   1+ edema   Neurologic:  No focal deficits.  Speech is fluent.  Conversation is coherent.          Data Review:       Labs reviewed    Imaging:      Radiology reviewed      Assessment:        Acute on chronic diastolic heart failure    Primary hypertension    Chronic coronary artery disease    Paroxysmal atrial fibrillation    Acute on chronic diastolic congestive heart failure    CKD stage III secondary to atherosclerotic disease completely bland urine, no hematuria no proteinuria no pyuria CKD secondary to atherosclerotic disease.  Volume overload  Congestive heart failure ejection fraction 56%, LVH, diastolic dysfunction, pulmonary hypertension.  Atrial fibrillation.  Coronary artery disease.  Bronchiectasis.  Chronic anemia.  "   Acute hypoxic respiratory failure.      Plan:     Patient is currently on loop diuretic Lasix40 mg IV every 8 hours, potassium, renal function is stable.  Volume is better.  Chest x-ray is better but she still shows increased volume.  Patient was given metolazone 10 mg yesterday, will repeat metolazone 10 mg p.o. today.  Monitor intake, output, daily weight.  Mild metabolic alkalosis likely secondary to respiratory acidosis.  We will follow-up this patient closely.  Will avoid nephrotoxic medications.  Pulmonary cardiology plan noted.  Thanks for consultation.  DW daughter at bedside         Cody Ayala MD  [facility]  10/7/2023  10:40 EDT

## 2023-10-08 LAB
ALBUMIN SERPL-MCNC: 3.9 G/DL (ref 3.5–5.2)
ALBUMIN/GLOB SERPL: 1.1 G/DL
ALP SERPL-CCNC: 120 U/L (ref 39–117)
ALT SERPL W P-5'-P-CCNC: 34 U/L (ref 1–33)
ANION GAP SERPL CALCULATED.3IONS-SCNC: 14.1 MMOL/L (ref 5–15)
AST SERPL-CCNC: 44 U/L (ref 1–32)
BASOPHILS # BLD AUTO: 0.02 10*3/MM3 (ref 0–0.2)
BASOPHILS NFR BLD AUTO: 0.2 % (ref 0–1.5)
BILIRUB SERPL-MCNC: 0.5 MG/DL (ref 0–1.2)
BUN SERPL-MCNC: 36 MG/DL (ref 8–23)
BUN/CREAT SERPL: 28.8 (ref 7–25)
CALCIUM SPEC-SCNC: 10.2 MG/DL (ref 8.2–9.6)
CHLORIDE SERPL-SCNC: 90 MMOL/L (ref 98–107)
CO2 SERPL-SCNC: 30.9 MMOL/L (ref 22–29)
CREAT SERPL-MCNC: 1.25 MG/DL (ref 0.57–1)
DEPRECATED RDW RBC AUTO: 48.8 FL (ref 37–54)
EGFRCR SERPLBLD CKD-EPI 2021: 40.3 ML/MIN/1.73
EOSINOPHIL # BLD AUTO: 0 10*3/MM3 (ref 0–0.4)
EOSINOPHIL NFR BLD AUTO: 0 % (ref 0.3–6.2)
ERYTHROCYTE [DISTWIDTH] IN BLOOD BY AUTOMATED COUNT: 13.7 % (ref 12.3–15.4)
GLOBULIN UR ELPH-MCNC: 3.5 GM/DL
GLUCOSE SERPL-MCNC: 148 MG/DL (ref 65–99)
HCT VFR BLD AUTO: 34.7 % (ref 34–46.6)
HGB BLD-MCNC: 11.4 G/DL (ref 12–15.9)
IMM GRANULOCYTES # BLD AUTO: 0.19 10*3/MM3 (ref 0–0.05)
IMM GRANULOCYTES NFR BLD AUTO: 2 % (ref 0–0.5)
LYMPHOCYTES # BLD AUTO: 1.47 10*3/MM3 (ref 0.7–3.1)
LYMPHOCYTES NFR BLD AUTO: 15.7 % (ref 19.6–45.3)
MCH RBC QN AUTO: 31.8 PG (ref 26.6–33)
MCHC RBC AUTO-ENTMCNC: 32.9 G/DL (ref 31.5–35.7)
MCV RBC AUTO: 96.9 FL (ref 79–97)
MONOCYTES # BLD AUTO: 0.62 10*3/MM3 (ref 0.1–0.9)
MONOCYTES NFR BLD AUTO: 6.6 % (ref 5–12)
NEUTROPHILS NFR BLD AUTO: 7.06 10*3/MM3 (ref 1.7–7)
NEUTROPHILS NFR BLD AUTO: 75.5 % (ref 42.7–76)
NRBC BLD AUTO-RTO: 0 /100 WBC (ref 0–0.2)
PLATELET # BLD AUTO: 307 10*3/MM3 (ref 140–450)
PMV BLD AUTO: 10.8 FL (ref 6–12)
POTASSIUM SERPL-SCNC: 4.2 MMOL/L (ref 3.5–5.2)
PROT SERPL-MCNC: 7.4 G/DL (ref 6–8.5)
RBC # BLD AUTO: 3.58 10*6/MM3 (ref 3.77–5.28)
SODIUM SERPL-SCNC: 135 MMOL/L (ref 136–145)
WBC NRBC COR # BLD: 9.36 10*3/MM3 (ref 3.4–10.8)

## 2023-10-08 PROCEDURE — 25010000002 FUROSEMIDE PER 20 MG: Performed by: HOSPITALIST

## 2023-10-08 PROCEDURE — 94669 MECHANICAL CHEST WALL OSCILL: CPT

## 2023-10-08 PROCEDURE — 25010000002 METHYLPREDNISOLONE PER 125 MG: Performed by: INTERNAL MEDICINE

## 2023-10-08 PROCEDURE — 97530 THERAPEUTIC ACTIVITIES: CPT

## 2023-10-08 PROCEDURE — 99232 SBSQ HOSP IP/OBS MODERATE 35: CPT | Performed by: INTERNAL MEDICINE

## 2023-10-08 PROCEDURE — 85025 COMPLETE CBC W/AUTO DIFF WBC: CPT | Performed by: INTERNAL MEDICINE

## 2023-10-08 PROCEDURE — 94761 N-INVAS EAR/PLS OXIMETRY MLT: CPT

## 2023-10-08 PROCEDURE — 94799 UNLISTED PULMONARY SVC/PX: CPT

## 2023-10-08 PROCEDURE — 80053 COMPREHEN METABOLIC PANEL: CPT | Performed by: INTERNAL MEDICINE

## 2023-10-08 PROCEDURE — 25010000002 CEFTRIAXONE PER 250 MG: Performed by: HOSPITALIST

## 2023-10-08 RX ADMIN — FERROUS SULFATE TAB 325 MG (65 MG ELEMENTAL FE) 325 MG: 325 (65 FE) TAB at 09:18

## 2023-10-08 RX ADMIN — GUAIFENESIN AND DEXTROMETHORPHAN 5 ML: 100; 10 SYRUP ORAL at 01:39

## 2023-10-08 RX ADMIN — BUDESONIDE AND FORMOTEROL FUMARATE DIHYDRATE 2 PUFF: 160; 4.5 AEROSOL RESPIRATORY (INHALATION) at 20:56

## 2023-10-08 RX ADMIN — METHYLPREDNISOLONE SODIUM SUCCINATE 60 MG: 125 INJECTION, POWDER, FOR SOLUTION INTRAMUSCULAR; INTRAVENOUS at 01:38

## 2023-10-08 RX ADMIN — BENZONATATE 200 MG: 100 CAPSULE ORAL at 20:42

## 2023-10-08 RX ADMIN — ISODIUM CHLORIDE 3 ML: 0.03 SOLUTION RESPIRATORY (INHALATION) at 20:56

## 2023-10-08 RX ADMIN — ISODIUM CHLORIDE 3 ML: 0.03 SOLUTION RESPIRATORY (INHALATION) at 08:08

## 2023-10-08 RX ADMIN — CEFTRIAXONE SODIUM 1000 MG: 1 INJECTION, POWDER, FOR SOLUTION INTRAMUSCULAR; INTRAVENOUS at 17:58

## 2023-10-08 RX ADMIN — APIXABAN 2.5 MG: 2.5 TABLET, FILM COATED ORAL at 05:12

## 2023-10-08 RX ADMIN — BENZONATATE 200 MG: 100 CAPSULE ORAL at 05:17

## 2023-10-08 RX ADMIN — MULTIPLE VITAMINS W/ MINERALS TAB 1 TABLET: TAB at 09:18

## 2023-10-08 RX ADMIN — POTASSIUM CHLORIDE 20 MEQ: 750 TABLET, EXTENDED RELEASE ORAL at 09:17

## 2023-10-08 RX ADMIN — METOPROLOL TARTRATE 100 MG: 50 TABLET, FILM COATED ORAL at 09:17

## 2023-10-08 RX ADMIN — CETIRIZINE HYDROCHLORIDE 10 MG: 10 TABLET ORAL at 09:17

## 2023-10-08 RX ADMIN — GUAIFENESIN 600 MG: 600 TABLET, EXTENDED RELEASE ORAL at 20:41

## 2023-10-08 RX ADMIN — CITALOPRAM 10 MG: 10 TABLET, FILM COATED ORAL at 11:09

## 2023-10-08 RX ADMIN — METHYLPREDNISOLONE SODIUM SUCCINATE 60 MG: 125 INJECTION, POWDER, FOR SOLUTION INTRAMUSCULAR; INTRAVENOUS at 12:48

## 2023-10-08 RX ADMIN — IPRATROPIUM BROMIDE AND ALBUTEROL SULFATE 3 ML: 2.5; .5 SOLUTION RESPIRATORY (INHALATION) at 08:10

## 2023-10-08 RX ADMIN — IPRATROPIUM BROMIDE AND ALBUTEROL SULFATE 3 ML: 2.5; .5 SOLUTION RESPIRATORY (INHALATION) at 20:56

## 2023-10-08 RX ADMIN — BUDESONIDE AND FORMOTEROL FUMARATE DIHYDRATE 2 PUFF: 160; 4.5 AEROSOL RESPIRATORY (INHALATION) at 08:06

## 2023-10-08 RX ADMIN — IPRATROPIUM BROMIDE AND ALBUTEROL SULFATE 3 ML: 2.5; .5 SOLUTION RESPIRATORY (INHALATION) at 12:50

## 2023-10-08 RX ADMIN — AZITHROMYCIN 250 MG: 250 TABLET, FILM COATED ORAL at 09:18

## 2023-10-08 RX ADMIN — ALBUTEROL SULFATE 2.5 MG: 2.5 SOLUTION RESPIRATORY (INHALATION) at 16:13

## 2023-10-08 RX ADMIN — FUROSEMIDE 40 MG: 10 INJECTION, SOLUTION INTRAMUSCULAR; INTRAVENOUS at 05:14

## 2023-10-08 RX ADMIN — MULTIPLE VITAMINS W/ MINERALS TAB 1 TABLET: TAB at 20:41

## 2023-10-08 RX ADMIN — Medication 1 CAPSULE: at 09:18

## 2023-10-08 RX ADMIN — APIXABAN 2.5 MG: 2.5 TABLET, FILM COATED ORAL at 16:34

## 2023-10-08 RX ADMIN — Medication 1000 UNITS: at 09:18

## 2023-10-08 RX ADMIN — METOPROLOL TARTRATE 100 MG: 50 TABLET, FILM COATED ORAL at 20:41

## 2023-10-08 RX ADMIN — GUAIFENESIN 600 MG: 600 TABLET, EXTENDED RELEASE ORAL at 09:18

## 2023-10-08 RX ADMIN — ISODIUM CHLORIDE 3 ML: 0.03 SOLUTION RESPIRATORY (INHALATION) at 16:11

## 2023-10-08 NOTE — PROGRESS NOTES
"      Name: Agnieszka Rizzo ADMIT: 10/4/2023   : 1930  PCP: Matt Rose MD    MRN: [id number] LOS: 3 days   AGE/SEX: 89 y.o. female  ROOM: [facility]     Date of Service: 10/8/2023                        Reason for  follow-up:         Ckd 3, chf, volume overload      Subjective:       Feeling better no distress         Review of Systems:         Constitutional: weakness.  HEENT:  No headache, otalgia, itchy eyes, nasal discharge or sore throat.  Cardiac:  Shortness of breath somewhat better  Chest:  No cough, phlegm or wheezing.  Abdomen:  No abdominal pain, nausea or vomiting.  Neuro:  No focal weakness, abnormal movements or seizure-like activity.  :   No hematuria, no pyuria, no dysuria, no flank pain.  Extremities:  Edema.  ROS was otherwise negative except as mentioned in the [place].          Objectives:     tMax 24 hrs: Temp (24hrs), Av.7 øF (36.5 øC), Min:97.5 øF (36.4 øC), Max:97.7 øF (36.5 øC)      Vitals Ranges:   Temp:  [97.5 øF (36.4 øC)-97.7 øF (36.5 øC)] 97.7 øF (36.5 øC)  Heart Rate:  [] 102  Resp:  [16-20] 18  BP: ()/(67-78) 115/77    Intake and Output Last 3 Shifts:   I/O last 3 completed shifts:  In: 600 [P.O.:600]  Out: 2250 [Urine:2250]      Exam:     /77   Pulse 102   Temp 97.7 øF (36.5 øC) (Oral)   Resp 18   Ht 160 cm (62.99\")   Wt 55.1 kg (121 lb 6.4 oz)   SpO2 100%   BMI 21.51 kg/mý     General Appearance:  Appears chronically ill looking    Head:    Normocephalic, atraumatic   Eyes:     EOM's intact, sclerae anicteric        Ears:    TMs not observed   Nose:   Patent without discharge   Neck:   Supple, JVD   Lungs:   Bilateral crackles   Chest wall:    No tenderness   Heart:    Regular rate and rhythm, S1 and S2 normal, no   rub    or gallop   Abdomen:     Soft, nontender, nondistended,  no masses, no organomegaly   Extremities:   1+ edema   Neurologic:  No focal deficits.  Speech is fluent.  Conversation is coherent.          Data Review:       Labs " reviewed    Imaging:      Radiology reviewed      Assessment:        Acute on chronic diastolic heart failure    Primary hypertension    Chronic coronary artery disease    Paroxysmal atrial fibrillation    Acute on chronic diastolic congestive heart failure    CKD stage III secondary to atherosclerotic disease completely bland urine, no hematuria no proteinuria no pyuria CKD secondary to atherosclerotic disease.  Volume overload  Congestive heart failure ejection fraction 56%, LVH, diastolic dysfunction, pulmonary hypertension.  Atrial fibrillation.  Coronary artery disease.  Bronchiectasis.  Chronic anemia.    Acute hypoxic respiratory failure.      Plan:      renal function mildly worse expected to worsen with volume control.  Creatinine 1.25.  Chest x-ray still shows infiltrate.  Volume seems better, chest x-ray still shows lung infiltrate from October 7.  Creatinine 1.25.  Noted patient is off of diuretics as per Dr. Willam Villar.  Monitor intake, output, daily weight.  Mild metabolic alkalosis likely secondary to respiratory acidosis.  We will follow-up this patient closely.  Will avoid nephrotoxic medications.  Pulmonary cardiology plan noted.  DW daughter at bedside         Cody Ayala MD  [facility]  10/8/2023  11:01 EDT

## 2023-10-08 NOTE — PLAN OF CARE
Patient was admitted on 10/4 for CHF and PNA. Patient was given I.S. and used throughout shift. Family at bedside most of shift. IV Lasix continued. IV abx continued. IV steroid started. Potassium replaced. Patient ambulated throughout shift. No complaints at this time. Plan for discharge in 2-3 days.

## 2023-10-08 NOTE — PLAN OF CARE
Goal Outcome Evaluation:  Plan of Care Reviewed With: (P) patient        Progress: (P) improving  Outcome Evaluation: (P) Patient admitted on 10/4 for CHF and PNA. IV abx continued today. Patient ambulated throughout the shift. Patient has used IS throughout shift. IV lasix discontinued. Potassium replaced. No complaints at this time. Plan for discharge in 1-2 days.

## 2023-10-08 NOTE — PROGRESS NOTES
"  PROGRESS NOTE  Patient Name: Agnieszka Rizzo  Age/Sex: 89 y.o. female  : 1930  MRN: [id number]    Date of Admission: 10/4/2023  Date of Encounter Visit: 10/08/23   LOS: 3 days   Patient Care Team:  Matt Rose MD as PCP - General (Family Medicine)  Marcie Robbins MD as Consulting Physician (Cardiology)  Nilesh Danielle MD as Consulting Physician (Urology)  Lina Morris RPH as Pharmacist  Lev Mckeon PharmD as Pharmacist (Pharmacy)  Rogelio Tucker MD as Consulting Physician (Pulmonary Disease)    Chief Complaint: Bronchopneumonia with bilateral patchy pulmonary infiltrate    Hospital course: Patient is feeling better, still on oxygen, still  having some cough,  Above  further questioning the patient did have problem with previous issues that always responded to steroids but with recurrent symptoms every time she is off the steroids which might further support the suspected organizing pneumonia.  She was started on systemic steroids yesterday        REVIEW OF SYSTEMS:   CONSTITUTIONAL: no fever or chills  CARDIOVASCULAR: No chest pain, chest pressure or chest discomfort. No palpitations or edema.   RESPIRATORY: Positive for cough and wheezing   GASTROINTESTINAL: No anorexia, nausea, vomiting or diarrhea. No abdominal pain or blood.   HEMATOLOGIC: No bleeding or bruising.     Ventilator/Non-Invasive Ventilation Settings (From admission, onward)      2 L/min nasal cannula oxygen              Vital Signs  Temp:  [97.5 øF (36.4 øC)-97.7 øF (36.5 øC)] 97.7 øF (36.5 øC)  Heart Rate:  [] 86  Resp:  [16-20] 16  BP: (112-129)/(66-78) 112/66  SpO2:  [93 %-100 %] 100 %  on  Flow (L/min):  [2] 2 Device (Oxygen Therapy): nasal cannula    Intake/Output Summary (Last 24 hours) at 10/8/2023 1345  Last data filed at 10/8/2023 1323  Gross per 24 hour   Intake 480 ml   Output 1700 ml   Net -1220 ml     Flowsheet Rows      Flowsheet Row First Filed Value   Admission Height 160 cm (63\") Documented at 10/04/2023 " 1908   Admission Weight 55.8 kg (123 lb) Documented at 10/04/2023 1908          Body mass index is 21.51 kg/mý.      10/06/23  1035 10/07/23  0339 10/08/23  0505   Weight: 55.8 kg (123 lb) 55.5 kg (122 lb 6.4 oz) 55.1 kg (121 lb 6.4 oz)       Physical Exam:  GEN:  No acute distress, alert, cooperative, well developed   EYES:   Sclerae clear. No icterus. PERRL. Normal EOM  ENT:   External ears/nose normal, no oral lesions, no thrush, mucous membranes moist  NECK:  Supple, midline trachea, no JVD  LUNGS: Normal chest on inspection, still having audible crackles, very faint, with minimal expiratory wheezes and good breath sounds bilaterally.  Breathing is nonlabored.  .   CV:  Regular rhythm and rate. Normal S1/S2. No murmurs, gallops, or rubs noted.  ABD:  Soft, nontender and nondistended. Normal bowel sounds. No guarding  EXT:  Moves all extremities well. No cyanosis. No redness. No edema.   Skin: Dry, intact, no bleeding    Results Review:        Results from last 7 days   Lab Units 10/08/23  0518 10/07/23  0436 10/06/23  0359 10/05/23  1859 10/05/23  0352 10/04/23  1953   SODIUM mmol/L 135* 139 142  --  144 140   POTASSIUM mmol/L 4.2 3.6 4.8 3.9 3.1* 3.4*   CHLORIDE mmol/L 90* 95* 102  --  102 99   CO2 mmol/L 30.9* 30.6* 29.9*  --  31.5* 27.0   BUN mg/dL 36* 22 18  --  24* 31*   CREATININE mg/dL 1.25* 0.97 0.94  --  1.01* 1.14*   CALCIUM mg/dL 10.2* 9.9* 9.7*  --  9.5 9.3   AST (SGOT) U/L 44* 39* 27  --   --  21   ALT (SGPT) U/L 34* 25 16  --   --  13   ANION GAP mmol/L 14.1 13.4 10.1  --  10.5 14.0   ALBUMIN g/dL 3.9 3.7 3.6  --   --  3.7     Results from last 7 days   Lab Units 10/04/23  1953   HSTROP T ng/L 31*         Results from last 7 days   Lab Units 10/04/23  1953   PROBNP pg/mL 8,193.0*     Results from last 7 days   Lab Units 10/08/23  0518 10/07/23  0436 10/06/23  0359 10/05/23  0352 10/04/23  1919   WBC 10*3/mm3 9.36 11.48* 12.57* 12.21* 13.91*   HEMOGLOBIN g/dL 11.4* 11.4* 10.5* 10.0* 9.6*  "  HEMATOCRIT % 34.7 34.3 31.4* 30.0* 29.0*   PLATELETS 10*3/mm3 307 281 265 265 278   MCV fL 96.9 96.3 97.8* 95.8 97.0   NEUTROPHIL % % 75.5 64.1  --   --  77.5*   LYMPHOCYTE % % 15.7* 19.7  --   --  10.6*   MONOCYTES % % 6.6 11.8  --   --  10.1   EOSINOPHIL % % 0.0* 2.1  --   --  0.8   BASOPHIL % % 0.2 0.7  --   --  0.3   IMM GRAN % % 2.0* 1.6*  --   --  0.7*                   Invalid input(s): \"LDLCALC\"          No results found for: \"POCGLU\"  Results from last 7 days   Lab Units 10/07/23  0436 10/05/23  0352   PROCALCITONIN ng/mL 0.21 0.38*         Results from last 7 days   Lab Units 10/04/23  2132   NITRITE UA  Negative   WBC UA /HPF 0-2   BACTERIA UA /HPF None Seen   SQUAM EPITHEL UA /HPF 0-2     Results from last 7 days   Lab Units 10/04/23  2243   COVID19  Not Detected   ADENOVIRUS DETECTION BY PCR  Not Detected   CORONAVIRUS 229E  Not Detected   CORONAVIRUS HKU1  Not Detected   CORONAVIRUS NL63  Not Detected   CORONAVIRUS OC43  Not Detected   HUMAN METAPNEUMOVIRUS  Not Detected   HUMAN RHINOVIRUS/ENTEROVIRUS  Not Detected   INFLUENZA B PCR  Not Detected   PARAINFLUENZA 1  Not Detected   PARAINFLUENZA VIRUS 2  Not Detected   PARAINFLUENZA VIRUS 3  Not Detected   PARAINFLUENZA VIRUS 4  Not Detected   BORDETELLA PERTUSSIS PCR  Not Detected   BORDETELLA PARAPERTUSSIS PCR  Not Detected   CHLAMYDOPHILA PNEUMONIAE PCR  Not Detected   MYCOPLAMA PNEUMO PCR  Not Detected   RSV, PCR  Not Detected               Imaging:   Imaging Results (All)       Procedure Component Value Units Date/Time              I reviewed the patient's new clinical results.  I personally viewed and interpreted the patient's imaging results:        Medication Review:   apixaban, 2.5 mg, Oral, Q12H  azithromycin, 250 mg, Oral, Q24H  budesonide-formoterol, 2 puff, Inhalation, BID - RT  cefTRIAXone, 1,000 mg, Intravenous, Q24H  cetirizine, 10 mg, Oral, Daily  cholecalciferol, 1,000 Units, Oral, Daily  citalopram, 10 mg, Oral, Daily  ferrous " sulfate, 325 mg, Oral, Daily With Breakfast  guaiFENesin, 600 mg, Oral, BID  ipratropium-albuterol, 3 mL, Nebulization, Q6H While Awake - RT  lactobacillus acidophilus, 1 capsule, Oral, Daily  methylPREDNISolone sodium succinate, 60 mg, Intravenous, Q12H  metoprolol tartrate, 100 mg, Oral, Q12H  multivitamin with minerals, 1 tablet, Oral, BID  potassium chloride, 20 mEq, Oral, Daily  sodium chloride, 3 mL, Nebulization, TID             ASSESSMENT:   Acute on chronic hypoxemic respiratory failure, home time oxygen at 2 L/min mainly at night  Bronchopneumonia with bilateral pulmonary infiltrate, patient is on Rocephin in addition to her chronic Zithromax regiment  Suspecting cryptogenic organizing pneumonia  Acute exacerbation of heart failure with preserved EF  Atrial fibrillation on anticoagulation with RVR  Bronchiectasis  History of Aspergillus  Coronary artery disease  Leukocytosis  Suspected organizing pneumonia versus aspiration pneumonia during bronchoscopy    PLAN:  Currently on antibiotic with Rocephin, patient is on diuretics that is being managed by renal.    White count is trending down slowly but it was not that elevated to start with, procalcitonin is down but was not very elevated as well, respiratory panel is negative, respiratory cultures so far no growth  This is a progression from the time when she had her bronchoscopy, the finding on the CAT scan are not typical of mycobacterial infection, patient might have organizing pneumonia and in that case she might respond favorably to systemic steroids.  Other possibilities could be aspiration pneumonia at the time of the bronchoscopy (less likely).  Patient was started on steroids on 10/7/2023  If continues to improve we will consider transitioning to an oral regimen with   slow taper over 4 to 5 weeks and follow-up as an outpatient  Discussed with patient with the family the possibility of cryptogenic organizing pneumonia and the underlying diagnosis  even though would not have a tissue biopsy to confirm it  We will continue to follow, will decide on transitioning to oral regimen by tomorrow depending on her progress       Discussed with patient  Labs/Notes/films were independently reviewed and pertinent results are summarized above  The copied texts in this note were reviewed and they are accurate as of 10/08/23    Disposition: Per primary team    Naman Mckenzie MD  10/08/23  13:45 EDT             Dictated utilizing Dragon dictation

## 2023-10-08 NOTE — PROGRESS NOTES
Name: Agnieszka Rizzo ADMIT: 10/4/2023   : 1930  PCP: Matt Rose MD    MRN: [id number] LOS: 3 days   AGE/SEX: 89 y.o. female  ROOM: [facility] ([place]     Subjective   Subjective     No events overnight. Still having some shortness of breath. Creatinine bumped this morning       Objective   Objective   Vital Signs  Temp:  [97.5 øF (36.4 øC)-97.7 øF (36.5 øC)] 97.7 øF (36.5 øC)  Heart Rate:  [] 88  Resp:  [16-20] 18  BP: (112-129)/(66-78) 112/66  SpO2:  [93 %-100 %] 100 %  on  Flow (L/min):  [2] 2;   Device (Oxygen Therapy): nasal cannula  Body mass index is 21.51 kg/mý.  Physical Exam  Constitutional:       General: She is not in acute distress.     Appearance: She is not toxic-appearing.   Cardiovascular:      Rate and Rhythm: Normal rate and regular rhythm.      Heart sounds: Normal heart sounds.   Pulmonary:      Effort: Pulmonary effort is normal.      Breath sounds: Rales present.   Abdominal:      General: Bowel sounds are normal. There is no distension.      Palpations: Abdomen is soft.      Tenderness: There is no abdominal tenderness. There is no guarding or rebound.   Musculoskeletal:         General: No tenderness.      Right lower leg: No edema.      Left lower leg: No edema.   Neurological:      Mental Status: She is alert.   Psychiatric:         Mood and Affect: Mood normal.         Behavior: Behavior normal.         Results Review     I reviewed the patient's new clinical results.  Results from last 7 days   Lab Units 10/08/23  0518 10/07/23  0436 10/06/23  0359 10/05/23  0352   WBC 10*3/mm3 9.36 11.48* 12.57* 12.21*   HEMOGLOBIN g/dL 11.4* 11.4* 10.5* 10.0*   PLATELETS 10*3/mm3 307 281 265 265     Results from last 7 days   Lab Units 10/08/23  0518 10/07/23  0436 10/06/23  0359 10/05/23  1859 10/05/23  0352   SODIUM mmol/L 135* 139 142  --  144   POTASSIUM mmol/L 4.2 3.6 4.8 3.9 3.1*   CHLORIDE mmol/L 90* 95* 102  --  102   CO2 mmol/L 30.9* 30.6* 29.9*  --  31.5*   BUN mg/dL 36* 22 18  --   "24*   CREATININE mg/dL 1.25* 0.97 0.94  --  1.01*   GLUCOSE mg/dL 148* 111* 114*  --  121*   Estimated Creatinine Clearance: 24.5 mL/min (A) (by C-G formula based on SCr of 1.25 mg/dL (H)).  Results from last 7 days   Lab Units 10/08/23  0518 10/07/23  0436 10/06/23  0359 10/04/23  1953   ALBUMIN g/dL 3.9 3.7 3.6 3.7   BILIRUBIN mg/dL 0.5 0.8 0.7 0.4   ALK PHOS U/L 120* 119* 106 114   AST (SGOT) U/L 44* 39* 27 21   ALT (SGPT) U/L 34* 25 16 13     Results from last 7 days   Lab Units 10/08/23  0518 10/07/23  0436 10/06/23  0359 10/05/23  0352 10/04/23  1953   CALCIUM mg/dL 10.2* 9.9* 9.7* 9.5 9.3   ALBUMIN g/dL 3.9 3.7 3.6  --  3.7     Results from last 7 days   Lab Units 10/07/23  0436 10/05/23  0352   PROCALCITONIN ng/mL 0.21 0.38*     COVID19   Date Value Ref Range Status   10/04/2023 Not Detected Not Detected - Ref. Range Final   08/04/2023 Not Detected Not Detected - Ref. Range Final   04/03/2023 Not Detected Not Detected - Ref. Range Final   07/15/2022 Not Detected Not Detected - Ref. Range Final   07/09/2022 Not Detected Not Detected - Ref. Range Final     No results found for: \"HGBA1C\", \"POCGLU\"    XR Chest 1 View  Narrative: PORTABLE CHEST     HISTORY: Shortness of air, CHF.     COMPARISON: Chest x-ray 10/04/2023.     FINDINGS: There is moderate cardiomegaly. There is patchy infiltrate  appreciated involving the left upper lobe laterally which is more  prominent as compared to 10/04/2023. Right perihilar and infrahilar  atelectasis/infiltrate is appreciated which is similar to slightly more  prominent as compared to the prior examination. There is no evidence of  consolidation or effusion.     Impression: Cardiomegaly and increasing infiltrates are noted as  described above.     This report was finalized on 10/7/2023 5:22 PM by Dr. Lon Velasquez M.D  on Workstation: [id number]       Scheduled Medications  apixaban, 2.5 mg, Oral, Q12H  azithromycin, 250 mg, Oral, Q24H  budesonide-formoterol, 2 puff, " Inhalation, BID - RT  cefTRIAXone, 1,000 mg, Intravenous, Q24H  cetirizine, 10 mg, Oral, Daily  cholecalciferol, 1,000 Units, Oral, Daily  citalopram, 10 mg, Oral, Daily  ferrous sulfate, 325 mg, Oral, Daily With Breakfast  guaiFENesin, 600 mg, Oral, BID  ipratropium-albuterol, 3 mL, Nebulization, Q6H While Awake - RT  lactobacillus acidophilus, 1 capsule, Oral, Daily  methylPREDNISolone sodium succinate, 60 mg, Intravenous, Q12H  metoprolol tartrate, 100 mg, Oral, Q12H  multivitamin with minerals, 1 tablet, Oral, BID  potassium chloride, 20 mEq, Oral, Daily  sodium chloride, 3 mL, Nebulization, TID    Infusions   Diet  Diet: Cardiac Diets; Healthy Heart (2-3 Na+); Texture: Regular Texture (IDDSI 7); Fluid Consistency: Thin (IDDSI 0)       Assessment/Plan     Active Hospital Problems    Diagnosis  POA    **Acute on chronic diastolic heart failure [I50.33]  Yes    Acute on chronic diastolic congestive heart failure [I50.33]  Yes    Paroxysmal atrial fibrillation [I48.0]  Yes    Primary hypertension [I10]  Yes    Chronic coronary artery disease [I25.10]  Yes      Resolved Hospital Problems   No resolved problems to display.       89 y.o. female admitted with Acute on chronic diastolic heart failure.    Acute on chronic respiratory failure with hypoxia-on ceftriaxone for possible bacterial pneumonia, but pulmonology suspects organizing pneumonia and so she has been started on steroids as well.  Acute on chronic diastolic heart failure-s/p IV diuresis. Diuretics held for increasing creatinine  CKD 3a-creatinine up today due to diuretics which are on hold as above  Persistent afib-metoprolol, eliquis  Bronchiectasis-on chronic azithromycin  Coronary artery disease-no chest pain  Essential hypertension-well controlled  Eliquis (home med) for DVT prophylaxis.  Limited code (no CPR, no intubation).  Discussed with patient.  Anticipate discharge home with home health timing yet to be determined.      Jonnie Daniels,  MD [facility]  10/08/23  17:21 EDT    I wore protective equipment throughout this patient encounter including a face mask, gloves and protective eyewear.  Hand hygiene was performed before donning protective equipment and after removal when leaving the room.

## 2023-10-08 NOTE — PROGRESS NOTES
LOS: 3 days   Patient Care Team:  Matt Rose MD as PCP - General (Family Medicine)  Marcie Robbins MD as Consulting Physician (Cardiology)  Nilesh Danielle MD as Consulting Physician (Urology)  Lina Morris [facility] as Pharmacist  Lev Mckeon, PharmD as Pharmacist (Pharmacy)  Rogelio Tucker MD as Consulting Physician (Pulmonary Disease)    Chief Complaint:     F/u chf    Interval History:     Dry mouth, mild skin tenting, dizziness when she stands, no chest pain, still mildly short winded.    Objective   Vital Signs  Temp:  [97.5 øF (36.4 øC)-97.7 øF (36.5 øC)] 97.7 øF (36.5 øC)  Heart Rate:  [] 102  Resp:  [16-20] 18  BP: ()/(67-78) 115/77    Intake/Output Summary (Last 24 hours) at 10/8/2023 0939  Last data filed at 10/8/2023 0849  Gross per 24 hour   Intake 600 ml   Output 900 ml   Net -300 ml       Comfortable NAD  Neck supple, no JVD or thyromegaly appreciated  S1/S2 RRR, no m/r/g  Lungs CTA B, normal effort  Abdomen S/NT/ND (+) BS, no HSM appreciated  Extremities warm, no clubbing, cyanosis, or edema  No visible or palpable skin lesions  A/Ox4, mood and affect appropriate    Results Review:      Results from last 7 days   Lab Units 10/08/23  0518 10/07/23  0436 10/06/23  0359   SODIUM mmol/L 135* 139 142   POTASSIUM mmol/L 4.2 3.6 4.8   CHLORIDE mmol/L 90* 95* 102   CO2 mmol/L 30.9* 30.6* 29.9*   BUN mg/dL 36* 22 18   CREATININE mg/dL 1.25* 0.97 0.94   GLUCOSE mg/dL 148* 111* 114*   CALCIUM mg/dL 10.2* 9.9* 9.7*     Results from last 7 days   Lab Units 10/04/23  1953   HSTROP T ng/L 31*     Results from last 7 days   Lab Units 10/08/23  0518 10/07/23  0436 10/06/23  0359   WBC 10*3/mm3 9.36 11.48* 12.57*   HEMOGLOBIN g/dL 11.4* 11.4* 10.5*   HEMATOCRIT % 34.7 34.3 31.4*   PLATELETS 10*3/mm3 307 281 265                       I reviewed the patient's new clinical results.  I personally viewed and interpreted the patient's EKG/Telemetry data        Medication Review:   apixaban, 2.5 mg,  Oral, Q12H  azithromycin, 250 mg, Oral, Q24H  budesonide-formoterol, 2 puff, Inhalation, BID - RT  cefTRIAXone, 1,000 mg, Intravenous, Q24H  cetirizine, 10 mg, Oral, Daily  cholecalciferol, 1,000 Units, Oral, Daily  citalopram, 10 mg, Oral, Daily  ferrous sulfate, 325 mg, Oral, Daily With Breakfast  furosemide, 40 mg, Intravenous, Q8H  guaiFENesin, 600 mg, Oral, BID  ipratropium-albuterol, 3 mL, Nebulization, Q6H While Awake - RT  lactobacillus acidophilus, 1 capsule, Oral, Daily  methylPREDNISolone sodium succinate, 60 mg, Intravenous, Q12H  metoprolol tartrate, 100 mg, Oral, Q12H  multivitamin with minerals, 1 tablet, Oral, BID  potassium chloride, 20 mEq, Oral, Daily  sodium chloride, 3 mL, Nebulization, TID             Assessment & Plan       Acute on chronic diastolic heart failure    Primary hypertension    Chronic coronary artery disease    Paroxysmal atrial fibrillation    Acute on chronic diastolic congestive heart failure        Acute on chronic hypoxic respiratory failure. Pulmonology following, CT done 10/6/2023 showed multifocal patchy groundglass infiltrates in both lungs.  Has been treated for PNA on rocephin - steroids initiated for organizing PNA.   Acute on chronic diastolic CHF. Is now euvolemic and may be actually dry  Mitral regurgitation, moderate in the past.  Stable on echo.  Persistent atrial fibrillation-overall heart rate stable on metoprolol.  Continue apixaban.   Bronchiectasis, on chronic azithromycin  Coronary artery disease. No anginal symptoms  Hypertension Will increase metoprolol slightly to help with rate control.  Multifactorial anemia-stable hemoglobin    Stopped lasix due to increase in creatinine and bun - cxr yesterday did not show chf.  She is mildly overdiuresed.  Advised that she drink water today.  We will follow.    Liyah Cheng MD  10/08/23  09:39 EDT

## 2023-10-08 NOTE — THERAPY TREATMENT NOTE
Patient Name: Agnieszka Rizzo  : 1930    MRN: [id number]                              Today's Date: 10/8/2023       Admit Date: 10/4/2023    Visit Dx:     ICD-10-CM ICD-9-CM   1. Acute on chronic diastolic congestive heart failure  I50.33 428.33     428.0     Patient Active Problem List   Diagnosis    Primary hypertension    Chronic coronary artery disease    Familial hypercholesterolemia    Mitral valve insufficiency    Ventricular premature beats    Ventricular tachycardia    Acute on chronic respiratory failure with hypoxia    Acid-fast bacteria present    DNR (do not resuscitate)    Chronic diastolic CHF (congestive heart failure)    Asymptomatic bacteriuria    Iron deficiency anemia    Hypokalemia    Bronchiectasis    Pneumonia of both lungs due to infectious organism    KINA (mycobacterium avium-intracellulare)    Paroxysmal atrial fibrillation    Anxiety    Exposure to hepatitis A    Medicare annual wellness visit, subsequent    Abdominal pain    Herpes infection    Moderate asthma with acute exacerbation    Bronchitis, acute, with bronchospasm    Abnormal EKG    Fall    Laceration of left upper extremity    Multiple skin tears    Alteration in anticoagulation    Blind right eye    Clinical diagnosis of COVID-19    Pneumonia of right lung due to infectious organism, unspecified part of lung    COPD (chronic obstructive pulmonary disease)    Dizziness    Bronchiectasis    Sepsis due to pneumonia    Acute pulmonary edema    Electrolyte imbalance    Acute on chronic diastolic congestive heart failure    Acute on chronic diastolic heart failure     Past Medical History:   Diagnosis Date    Aspergillus     Asthma     Atrial fibrillation     chronic    Atrial flutter     Bronchiectasis     C. difficile diarrhea 2017    CAD (coronary artery disease)     nonobstructive    Chronic diastolic CHF (congestive heart failure)     Colitis     Cough     Cryoglobulinemia     Dyspnea on exertion     Fall      Hyperlipidemia     Hypertension     Hyponatremia     Hypoxia     Infectious viral hepatitis     AGE 13    Left shoulder pain     Leg swelling     Lesion of lung     Malignant hyperthermia due to anesthesia     Mild tricuspid regurgitation     MR (mitral regurgitation)     mild    MVP (mitral valve prolapse)     Permanent atrial fibrillation     Pneumonia with the fungal infection aspergillosis 01/06/2017    Pneumothorax     SOB (shortness of breath)     UTI (urinary tract infection)     Wheeze     mild     Past Surgical History:   Procedure Laterality Date    BRONCHOSCOPY N/A 11/12/2016    Procedure: BRONCHOSCOPY WITH FLUORO, BRUSHINGS, BAL, AND BIOPSIES;  Surgeon: Rogelio Tucker MD;  Location: [facility] ENDOSCOPY;  Service:     BRONCHOSCOPY Bilateral 06/03/2017    Procedure: BRONCHOSCOPY with BAL ;  Surgeon: Sung King MD;  Location: [facility] ENDOSCOPY;  Service:     BRONCHOSCOPY N/A 12/17/2019    Procedure: BRONCHOSCOPY WITH WASHINGS;  Surgeon: Rogelio Tucker MD;  Location: [facility] ENDOSCOPY;  Service: Pulmonary    BRONCHOSCOPY N/A 07/15/2022    Procedure: BRONCHOSCOPY;  Surgeon: Rogelio Tucker MD;  Location: [facility] ENDOSCOPY;  Service: Pulmonary;  Laterality: N/A;  Pre: Pneumonia  Post: Pneumonia    BRONCHOSCOPY N/A 10/2/2023    Procedure: BRONCHOSCOPY WITH BRONCHIAL AVEOLAR LAVAGE;  Surgeon: Rogelio Tucker MD;  Location: [facility] ENDOSCOPY;  Service: Pulmonary;  Laterality: N/A;  PRE:BRONCHIECTASIS /   POST: SAME    CATARACT EXTRACTION EXTRACAPSULAR W/ INTRAOCULAR LENS IMPLANTATION      COLONOSCOPY      2013    D & C WITH SUCTION      HYSTERECTOMY      KNEE ARTHROSCOPY Left     Partial      General Information       Row Name 10/08/23 1535          Physical Therapy Time and Intention    Document Type therapy note (daily note)  -MS     Mode of Treatment physical therapy;individual therapy  -MS       Row Name 10/08/23 9449          General Information    Patient Profile Reviewed yes  -MS     Existing Precautions/Restrictions  fall;oxygen therapy device and L/min   -MS     Barriers to Rehab none identified  -MS       Row Name 10/08/23 1535          Cognition    Orientation Status (Cognition) oriented x 3  -MS       Row Name 10/08/23 1535          Safety Issues, Functional Mobility    Comment, Safety Issues/Impairments (Mobility) Gait belt used for safety.  -MS               User Key  (r) = Recorded By, (t) = Taken By, (c) = Cosigned By      Initials Name Provider Type    Karel Rodriguez, PT Physical Therapist                   Mobility       Row Name 10/08/23 1535          Bed Mobility    Bed Mobility supine-sit;sit-supine  -MS     Comment, (Bed Mobility) Pt. up in chair this PM.  -MS       Row Name 10/08/23 1535          Sit-Stand Transfer    Sit-Stand [place] (Transfers) standby assist  -MS       Row Name 10/08/23 1535          Gait/Stairs (Locomotion)    [place] Level (Gait) contact guard  -MS     Distance in Feet (Gait) 60 feet  -MS     Comment, (Gait/Stairs) Mild unsteadiness but no overt losses of balance noted.  -MS               User Key  (r) = Recorded By, (t) = Taken By, (c) = Cosigned By      Initials Name Provider Type    Karel Rodriguez, PT Physical Therapist                   Obj/Interventions       Row Name 10/08/23 1536          Motor Skills    Therapeutic Exercise --  BLE standing ther. ex. program x 10 reps completed (Heel Raises, Mini-squats, Hip Abduction)  -MS               User Key  (r) = Recorded By, (t) = Taken By, (c) = Cosigned By      Initials Name Provider Type    Karel Rodriguez, PT Physical Therapist                   Goals/Plan    No documentation.                  Clinical Impression       Row Name 10/08/23 1536          Pain    Pretreatment Pain Rating 0/10 - no pain  -MS     Posttreatment Pain Rating 0/10 - no pain  -MS       Row Name 10/08/23 1536          Positioning and Restraints    Pre-Treatment Position sitting in chair/recliner  -MS     Post Treatment Position chair  -MS      In Chair notified nsg;sitting;call light within reach;encouraged to call for assist;exit alarm on;with family/caregiver  All lines intact. V.S.S.  -MS               User Key  (r) = Recorded By, (t) = Taken By, (c) = Cosigned By      Initials Name Provider Type    Isabela Rodriguezdiane AGUIRRE, PT Physical Therapist                   Outcome Measures       Row Name 10/08/23 1537 10/08/23 0840       How much help from another person do you currently need...    Turning from your back to your side while in flat bed without using bedrails? 3  -MS 4  -HS (r) CS (t) HS (c)    Moving from lying on back to sitting on the side of a flat bed without bedrails? 3  -MS 4  -HS (r) CS (t) HS (c)    Moving to and from a bed to a chair (including a wheelchair)? 3  -MS 4  -HS (r) CS (t) HS (c)    Standing up from a chair using your arms (e.g., wheelchair, bedside chair)? 3  -MS 4  -HS (r) CS (t) HS (c)    Climbing 3-5 steps with a railing? 3  -MS 3  -HS (r) CS (t) HS (c)    To walk in hospital room? 3  -MS 4  -HS (r) CS (t) HS (c)    AM-PAC 6 Clicks Score (PT) 18  -MS 23  -HS (r) CS (t)    Highest level of mobility 6 --> Walked 10 steps or more  -MS 7 --> Walked 25 feet or more  -HS (r) CS (t)      Row Name 10/08/23 1537          Functional Assessment    Outcome Measure Options AM-PAC 6 Clicks Basic Mobility (PT)  -MS               User Key  (r) = Recorded By, (t) = Taken By, (c) = Cosigned By      Initials Name Provider Type    MS Karel Daniels, PT Physical Therapist    HS Marlen Millard, RN Registered Nurse    Geovanna Schmidt, Nursing Student Nursing Student                                 Physical Therapy Education       Title: PT OT SLP Therapies (Done)       Topic: Physical Therapy (Done)       Point: Mobility training (Done)       Learning Progress Summary             Patient Acceptance, E,D, VU,NR by MS at 10/8/2023 1537    Acceptance, E, VU by CLARK at 10/6/2023 1716    Acceptance, E,D, VU,NR by MS at 10/6/2023 1531                          Point: Home exercise program (Done)       Learning Progress Summary             Patient Acceptance, E,D, VU,NR by MS at 10/8/2023 1537                         Point: Body mechanics (Done)       Learning Progress Summary             Patient Acceptance, E,D, VU,NR by MS at 10/8/2023 1537    Acceptance, E,D, VU,NR by MS at 10/6/2023 1537                         Point: Precautions (Done)       Learning Progress Summary             Patient Acceptance, E,D, VU,NR by MS at 10/8/2023 1537    Acceptance, E,D, VU,NR by MS at 10/6/2023 1537                                         User Key       Initials Effective Dates Name Provider Type Discipline    MS 06/16/21 -  Karel Daniels PT Physical Therapist PT     01/03/21 -  Natasha Abarca, RN Registered Nurse Nurse                  PT Recommendation and Plan  Planned Therapy Interventions (PT): balance training, bed mobility training, gait training, home exercise program, postural re-education, transfer training, strengthening  Plan of Care Reviewed With: patient  Outcome Evaluation: Upon entering room, pt. sitting up in chair, awake/alert, and agreeable to work with P.T. this date. Pt. able to ambulate 60 feet, CGA x 1, with no use of AD this PM. Pt. requires SBA x 1 for sit <-> stand transfers. BLE Standing ther. ex. program x 10 reps completed for general strengthening. Mild unsteadiness during ambulation but no overt losses of balance. Overall improved tolerance to functional activity this date with an increase in gait distance compared to last P.T. session. Will continue to progress functional mobility as tolerated.     Time Calculation:         PT Charges       Row Name 10/08/23 1545             Time Calculation    Start Time 1340  -MS      Stop Time 1355  -MS      Time Calculation (min) 15 min  -MS      PT Received On 10/08/23  -MS      PT - Next Appointment 10/09/23  -MS         Time Calculation- PT    Total Timed Code Minutes- PT 14 minute(s)  -MS                 User Key  (r) = Recorded By, (t) = Taken By, (c) = Cosigned By      Initials Name Provider Type    Karel Rodriguez, PT Physical Therapist                  Therapy Charges for Today       Code Description Service Date Service Provider Modifiers Qty    [id number]  PT THERAPEUTIC ACT EA 15 MIN 10/8/2023 Karel Daniels, PT GP 1            PT G-Codes  Outcome Measure Options: AM-PAC 6 Clicks Basic Mobility (PT)  AM-PAC 6 Clicks Score (PT): 18  PT Discharge Summary  Anticipated Discharge Disposition (PT): home with assist, home with home health    Karel Daniels, PT  10/8/2023

## 2023-10-08 NOTE — PLAN OF CARE
Goal Outcome Evaluation:  Plan of Care Reviewed With: patient           Outcome Evaluation: Upon entering room, pt. sitting up in chair, awake/alert, and agreeable to work with P.T. this date. Pt. able to ambulate 60 feet, CGA x 1, with no use of AD this PM. Pt. requires SBA x 1 for sit <-> stand transfers. BLE Standing ther. ex. program x 10 reps completed for general strengthening. Mild unsteadiness during ambulation but no overt losses of balance. Overall improved tolerance to functional activity this date with an increase in gait distance compared to last P.T. session. Will continue to progress functional mobility as tolerated.      Anticipated Discharge Disposition (PT): home with assist, home with home health

## 2023-10-08 NOTE — PLAN OF CARE
Goal Outcome Evaluation:  Plan of Care Reviewed With: patient        Progress: improving  Outcome Evaluation: Pt AOX4. Pt had no c/o pain or discomfort. 2L of O2. Safety maintained.    Diuretic in Use: Lasix IV  Response to Diuretics (Output greater than intake): greater  Daily Weight (up or down): down  O2 Requirements: 2L  Functional Status (Activity level, tolerance and respiratory symptoms): baseline  Discharge Plans:  TBD

## 2023-10-09 ENCOUNTER — APPOINTMENT (OUTPATIENT)
Dept: GENERAL RADIOLOGY | Facility: HOSPITAL | Age: 88
DRG: 291 | End: 2023-10-09
Payer: MEDICARE

## 2023-10-09 LAB
ANION GAP SERPL CALCULATED.3IONS-SCNC: 13.9 MMOL/L (ref 5–15)
BUN SERPL-MCNC: 47 MG/DL (ref 8–23)
BUN/CREAT SERPL: 38.8 (ref 7–25)
CALCIUM SPEC-SCNC: 10.3 MG/DL (ref 8.2–9.6)
CHLORIDE SERPL-SCNC: 90 MMOL/L (ref 98–107)
CO2 SERPL-SCNC: 30.1 MMOL/L (ref 22–29)
CREAT SERPL-MCNC: 1.21 MG/DL (ref 0.57–1)
DEPRECATED RDW RBC AUTO: 47.1 FL (ref 37–54)
EGFRCR SERPLBLD CKD-EPI 2021: 41.9 ML/MIN/1.73
ERYTHROCYTE [DISTWIDTH] IN BLOOD BY AUTOMATED COUNT: 13.6 % (ref 12.3–15.4)
FUNGUS WND CULT: ABNORMAL
GLUCOSE SERPL-MCNC: 155 MG/DL (ref 65–99)
HCT VFR BLD AUTO: 35 % (ref 34–46.6)
HGB BLD-MCNC: 11.4 G/DL (ref 12–15.9)
MCH RBC QN AUTO: 31.1 PG (ref 26.6–33)
MCHC RBC AUTO-ENTMCNC: 32.6 G/DL (ref 31.5–35.7)
MCV RBC AUTO: 95.4 FL (ref 79–97)
PLATELET # BLD AUTO: 386 10*3/MM3 (ref 140–450)
PMV BLD AUTO: 10.5 FL (ref 6–12)
POTASSIUM SERPL-SCNC: 3.4 MMOL/L (ref 3.5–5.2)
RBC # BLD AUTO: 3.67 10*6/MM3 (ref 3.77–5.28)
SODIUM SERPL-SCNC: 134 MMOL/L (ref 136–145)
WBC NRBC COR # BLD: 13.94 10*3/MM3 (ref 3.4–10.8)

## 2023-10-09 PROCEDURE — 94664 DEMO&/EVAL PT USE INHALER: CPT

## 2023-10-09 PROCEDURE — 25010000002 METHYLPREDNISOLONE PER 125 MG: Performed by: INTERNAL MEDICINE

## 2023-10-09 PROCEDURE — 85027 COMPLETE CBC AUTOMATED: CPT | Performed by: STUDENT IN AN ORGANIZED HEALTH CARE EDUCATION/TRAINING PROGRAM

## 2023-10-09 PROCEDURE — 94761 N-INVAS EAR/PLS OXIMETRY MLT: CPT

## 2023-10-09 PROCEDURE — 99232 SBSQ HOSP IP/OBS MODERATE 35: CPT | Performed by: INTERNAL MEDICINE

## 2023-10-09 PROCEDURE — 97116 GAIT TRAINING THERAPY: CPT

## 2023-10-09 PROCEDURE — 80048 BASIC METABOLIC PNL TOTAL CA: CPT | Performed by: STUDENT IN AN ORGANIZED HEALTH CARE EDUCATION/TRAINING PROGRAM

## 2023-10-09 PROCEDURE — 94799 UNLISTED PULMONARY SVC/PX: CPT

## 2023-10-09 PROCEDURE — 63710000001 PREDNISONE PER 1 MG: Performed by: INTERNAL MEDICINE

## 2023-10-09 PROCEDURE — 94669 MECHANICAL CHEST WALL OSCILL: CPT

## 2023-10-09 PROCEDURE — 71045 X-RAY EXAM CHEST 1 VIEW: CPT

## 2023-10-09 RX ORDER — PREDNISONE 20 MG/1
20 TABLET ORAL
Status: DISCONTINUED | OUTPATIENT
Start: 2023-10-09 | End: 2023-10-10 | Stop reason: HOSPADM

## 2023-10-09 RX ORDER — POTASSIUM CHLORIDE 750 MG/1
40 TABLET, FILM COATED, EXTENDED RELEASE ORAL ONCE
Status: COMPLETED | OUTPATIENT
Start: 2023-10-09 | End: 2023-10-09

## 2023-10-09 RX ADMIN — ISODIUM CHLORIDE 3 ML: 0.03 SOLUTION RESPIRATORY (INHALATION) at 15:26

## 2023-10-09 RX ADMIN — Medication 1000 UNITS: at 08:39

## 2023-10-09 RX ADMIN — IPRATROPIUM BROMIDE AND ALBUTEROL SULFATE 3 ML: 2.5; .5 SOLUTION RESPIRATORY (INHALATION) at 07:48

## 2023-10-09 RX ADMIN — ISODIUM CHLORIDE 3 ML: 0.03 SOLUTION RESPIRATORY (INHALATION) at 07:49

## 2023-10-09 RX ADMIN — BUDESONIDE AND FORMOTEROL FUMARATE DIHYDRATE 2 PUFF: 160; 4.5 AEROSOL RESPIRATORY (INHALATION) at 19:04

## 2023-10-09 RX ADMIN — GUAIFENESIN 600 MG: 600 TABLET, EXTENDED RELEASE ORAL at 20:21

## 2023-10-09 RX ADMIN — Medication 1 CAPSULE: at 08:39

## 2023-10-09 RX ADMIN — METHYLPREDNISOLONE SODIUM SUCCINATE 60 MG: 125 INJECTION, POWDER, FOR SOLUTION INTRAMUSCULAR; INTRAVENOUS at 13:33

## 2023-10-09 RX ADMIN — MULTIPLE VITAMINS W/ MINERALS TAB 1 TABLET: TAB at 08:39

## 2023-10-09 RX ADMIN — PREDNISONE 20 MG: 20 TABLET ORAL at 17:33

## 2023-10-09 RX ADMIN — MULTIPLE VITAMINS W/ MINERALS TAB 1 TABLET: TAB at 20:21

## 2023-10-09 RX ADMIN — POTASSIUM CHLORIDE 20 MEQ: 750 TABLET, EXTENDED RELEASE ORAL at 08:38

## 2023-10-09 RX ADMIN — BENZONATATE 200 MG: 100 CAPSULE ORAL at 13:33

## 2023-10-09 RX ADMIN — METOPROLOL TARTRATE 100 MG: 50 TABLET, FILM COATED ORAL at 08:38

## 2023-10-09 RX ADMIN — IPRATROPIUM BROMIDE AND ALBUTEROL SULFATE 3 ML: 2.5; .5 SOLUTION RESPIRATORY (INHALATION) at 19:03

## 2023-10-09 RX ADMIN — METHYLPREDNISOLONE SODIUM SUCCINATE 60 MG: 125 INJECTION, POWDER, FOR SOLUTION INTRAMUSCULAR; INTRAVENOUS at 00:48

## 2023-10-09 RX ADMIN — IPRATROPIUM BROMIDE AND ALBUTEROL SULFATE 3 ML: 2.5; .5 SOLUTION RESPIRATORY (INHALATION) at 11:08

## 2023-10-09 RX ADMIN — ALBUTEROL SULFATE 2.5 MG: 2.5 SOLUTION RESPIRATORY (INHALATION) at 15:25

## 2023-10-09 RX ADMIN — ISODIUM CHLORIDE 3 ML: 0.03 SOLUTION RESPIRATORY (INHALATION) at 19:04

## 2023-10-09 RX ADMIN — CITALOPRAM 10 MG: 10 TABLET, FILM COATED ORAL at 08:39

## 2023-10-09 RX ADMIN — BUDESONIDE AND FORMOTEROL FUMARATE DIHYDRATE 2 PUFF: 160; 4.5 AEROSOL RESPIRATORY (INHALATION) at 07:50

## 2023-10-09 RX ADMIN — POTASSIUM CHLORIDE 40 MEQ: 750 TABLET, EXTENDED RELEASE ORAL at 13:33

## 2023-10-09 RX ADMIN — AZITHROMYCIN 250 MG: 250 TABLET, FILM COATED ORAL at 08:38

## 2023-10-09 RX ADMIN — APIXABAN 2.5 MG: 2.5 TABLET, FILM COATED ORAL at 08:38

## 2023-10-09 RX ADMIN — CETIRIZINE HYDROCHLORIDE 10 MG: 10 TABLET ORAL at 08:38

## 2023-10-09 RX ADMIN — BENZONATATE 200 MG: 100 CAPSULE ORAL at 20:59

## 2023-10-09 RX ADMIN — APIXABAN 2.5 MG: 2.5 TABLET, FILM COATED ORAL at 20:22

## 2023-10-09 RX ADMIN — FERROUS SULFATE TAB 325 MG (65 MG ELEMENTAL FE) 325 MG: 325 (65 FE) TAB at 08:38

## 2023-10-09 RX ADMIN — METOPROLOL TARTRATE 100 MG: 50 TABLET, FILM COATED ORAL at 20:21

## 2023-10-09 RX ADMIN — GUAIFENESIN 600 MG: 600 TABLET, EXTENDED RELEASE ORAL at 08:39

## 2023-10-09 NOTE — NURSING NOTE
Diuretic in Use: on hold   Response to Diuretics (Output greater than intake): yes  Daily Weight (up or down): up  O2 Requirements: O2 2L NC-baseline at home   Functional Status (Activity level, tolerance and respiratory symptoms): does well up in room   Discharge Plans:  home with home health, 1-2 days

## 2023-10-09 NOTE — CASE MANAGEMENT/SOCIAL WORK
Continued Stay Note  [facility]     Patient Name: Agnieszka Rizzo  MRN: [id number]  Today's Date: 10/9/2023    Admit Date: 10/4/2023    Plan: Home, family to transport   Discharge Plan       Row Name 10/09/23 1308       Plan    Plan Home, family to transport    Plan Comments Met briefly with pt at bedside. Confirmed that pt has no needs. Pt does not want home health. Pt does have the Private duty sitter list at bedside for use if needed.                   Discharge Codes    No documentation.                 Expected Discharge Date and Time       Expected Discharge Date Expected Discharge Time    Oct 9, 2023               Elisha Leal RN  Continued Stay Note  [facility]     Patient Name: Agnieszka Rizzo  MRN: [id number]  Today's Date: 10/9/2023    Admit Date: 10/4/2023    Plan: Home, family to transport   Discharge Plan       Row Name 10/09/23 1308       Plan    Plan Home, family to transport    Plan Comments Met briefly with pt at bedside. Confirmed that pt has no needs. Pt does not want home health. Pt does have the Private duty sitter list at bedside for use if needed.                   Discharge Codes    No documentation.                 Expected Discharge Date and Time       Expected Discharge Date Expected Discharge Time    Oct 9, 2023               Elisha Leal RN

## 2023-10-09 NOTE — PROGRESS NOTES
Name: Agnieszka Rizzo ADMIT: 10/4/2023   : 1930  PCP: Matt Rose MD    MRN: [id number] LOS: 4 days   AGE/SEX: 89 y.o. female  ROOM: [facility]     Subjective   Subjective     No events overnight. Still having some shortness of breath, but doing ok overall. Creatinine is stable since yesterday at 1.21. O2 requirement is stable on 2L       Objective   Objective   Vital Signs  Temp:  [97.5 øF (36.4 øC)-97.7 øF (36.5 øC)] 97.5 øF (36.4 øC)  Heart Rate:  [] 89  Resp:  [18-24] 24  BP: (110-139)/(68-82) 110/68  SpO2:  [90 %-100 %] 94 %  on  Flow (L/min):  [2-2.5] 2;   Device (Oxygen Therapy): nasal cannula  Body mass index is 21.56 kg/mý.  Physical Exam  Constitutional:       General: She is not in acute distress.     Appearance: She is not toxic-appearing.   Cardiovascular:      Rate and Rhythm: Normal rate and regular rhythm.      Heart sounds: Normal heart sounds.   Pulmonary:      Effort: Pulmonary effort is normal. No respiratory distress.      Breath sounds: Normal breath sounds. No wheezing, rhonchi or rales.   Abdominal:      General: Bowel sounds are normal. There is no distension.      Palpations: Abdomen is soft.      Tenderness: There is no abdominal tenderness. There is no guarding or rebound.   Musculoskeletal:         General: No tenderness.      Right lower leg: No edema.      Left lower leg: No edema.   Neurological:      Mental Status: She is alert.   Psychiatric:         Mood and Affect: Mood normal.         Behavior: Behavior normal.         Results Review     I reviewed the patient's new clinical results.  Results from last 7 days   Lab Units 10/09/23  0418 10/08/23  0518 10/07/23  0436 10/06/23  0359   WBC 10*3/mm3 13.94* 9.36 11.48* 12.57*   HEMOGLOBIN g/dL 11.4* 11.4* 11.4* 10.5*   PLATELETS 10*3/mm3 386 307 281 265     Results from last 7 days   Lab Units 10/09/23  0418 10/08/23  0518 10/07/23  0436 10/06/23  0359   SODIUM mmol/L 134* 135* 139 142   POTASSIUM mmol/L 3.4* 4.2 3.6  "4.8   CHLORIDE mmol/L 90* 90* 95* 102   CO2 mmol/L 30.1* 30.9* 30.6* 29.9*   BUN mg/dL 47* 36* 22 18   CREATININE mg/dL 1.21* 1.25* 0.97 0.94   GLUCOSE mg/dL 155* 148* 111* 114*   Estimated Creatinine Clearance: 25.3 mL/min (A) (by C-G formula based on SCr of 1.21 mg/dL (H)).  Results from last 7 days   Lab Units 10/08/23  0518 10/07/23  0436 10/06/23  0359 10/04/23  1953   ALBUMIN g/dL 3.9 3.7 3.6 3.7   BILIRUBIN mg/dL 0.5 0.8 0.7 0.4   ALK PHOS U/L 120* 119* 106 114   AST (SGOT) U/L 44* 39* 27 21   ALT (SGPT) U/L 34* 25 16 13     Results from last 7 days   Lab Units 10/09/23  0418 10/08/23  0518 10/07/23  0436 10/06/23  0359 10/05/23  0352 10/04/23  1953   CALCIUM mg/dL 10.3* 10.2* 9.9* 9.7*   < > 9.3   ALBUMIN g/dL  --  3.9 3.7 3.6  --  3.7    < > = values in this interval not displayed.     Results from last 7 days   Lab Units 10/07/23  0436 10/05/23  0352   PROCALCITONIN ng/mL 0.21 0.38*     COVID19   Date Value Ref Range Status   10/04/2023 Not Detected Not Detected - Ref. Range Final   08/04/2023 Not Detected Not Detected - Ref. Range Final   04/03/2023 Not Detected Not Detected - Ref. Range Final   07/15/2022 Not Detected Not Detected - Ref. Range Final   07/09/2022 Not Detected Not Detected - Ref. Range Final     No results found for: \"HGBA1C\", \"POCGLU\"    XR Chest 1 View  Narrative: PORTABLE CHEST     HISTORY: Shortness of air, CHF.     COMPARISON: Chest x-ray 10/04/2023.     FINDINGS: There is moderate cardiomegaly. There is patchy infiltrate  appreciated involving the left upper lobe laterally which is more  prominent as compared to 10/04/2023. Right perihilar and infrahilar  atelectasis/infiltrate is appreciated which is similar to slightly more  prominent as compared to the prior examination. There is no evidence of  consolidation or effusion.     Impression: Cardiomegaly and increasing infiltrates are noted as  described above.     This report was finalized on 10/7/2023 5:22 PM by Dr. Lon Velasquez, " M.D  on Workstation: [id number]       Scheduled Medications  apixaban, 2.5 mg, Oral, Q12H  azithromycin, 250 mg, Oral, Q24H  budesonide-formoterol, 2 puff, Inhalation, BID - RT  cefTRIAXone, 1,000 mg, Intravenous, Q24H  cetirizine, 10 mg, Oral, Daily  citalopram, 10 mg, Oral, Daily  ferrous sulfate, 325 mg, Oral, Daily With Breakfast  guaiFENesin, 600 mg, Oral, BID  ipratropium-albuterol, 3 mL, Nebulization, Q6H While Awake - RT  lactobacillus acidophilus, 1 capsule, Oral, Daily  methylPREDNISolone sodium succinate, 60 mg, Intravenous, Q12H  metoprolol tartrate, 100 mg, Oral, Q12H  multivitamin with minerals, 1 tablet, Oral, BID  potassium chloride, 20 mEq, Oral, Daily  sodium chloride, 3 mL, Nebulization, TID    Infusions   Diet  Diet: Cardiac Diets; Healthy Heart (2-3 Na+); Texture: Regular Texture (IDDSI 7); Fluid Consistency: Thin (IDDSI 0)       Assessment/Plan     Active Hospital Problems    Diagnosis  POA    **Acute on chronic diastolic heart failure [I50.33]  Yes    Acute on chronic diastolic congestive heart failure [I50.33]  Yes    Paroxysmal atrial fibrillation [I48.0]  Yes    Primary hypertension [I10]  Yes    Chronic coronary artery disease [I25.10]  Yes      Resolved Hospital Problems   No resolved problems to display.       89 y.o. female admitted with Acute on chronic diastolic heart failure.    Acute on chronic respiratory failure with hypoxia-on ceftriaxone for possible bacterial pneumonia, but pulmonology suspects organizing pneumonia and so she is on steroids as well  Acute on chronic diastolic heart failure-s/p IV diuresis. Diuretics held for increasing creatinine  CKD 3a-creatinine is stable today with diuretics held  Hypercalcemia-follow up pth, vit d, serum electrophoresis/immunofixation  Persistent afib-metoprolol, eliquis  Bronchiectasis-on chronic azithromycin  Coronary artery disease-no chest pain  Essential hypertension-well controlled  Eliquis (home med) for DVT prophylaxis.  Limited code  (no CPR, no intubation).  Discussed with patient and nursing staff.  Anticipate discharge home with home health timing yet to be determined.      Jonnie Daniels MD  [facility]  10/09/23  14:11 EDT    I wore protective equipment throughout this patient encounter including a face mask, gloves and protective eyewear.  Hand hygiene was performed before donning protective equipment and after removal when leaving the room.

## 2023-10-09 NOTE — PLAN OF CARE
Goal Outcome Evaluation:  Plan of Care Reviewed With: patient, daughter        Progress: improving  Outcome Evaluation: Patient has been alert and oriented. Up ad mira/SBA and walked with therapy in hallway this shift. Remains on 2L via NC, which is baseline for patient. Denies pain. Continues with dry mostly nonproductive cough croupy cought. IV steroids transitioned to oral steroids. IV abt d/c'd this shift. Appetite and fluid intake adequate. Vital signs stable. Call light in reach. Safety maintained.

## 2023-10-09 NOTE — PLAN OF CARE
Goal Outcome Evaluation:  Plan of Care Reviewed With: (P) patient, family           Outcome Evaluation: (P) Pt pleasant and agreeable to PT this PM. Pt sitting in chair upon entering room, eager to ambulate. STS completed with SBA, pt steady without need for AD support. Pt ambulated 120 ft with CGA and 1 standing rest break taken, increased O2 supply from 2L to 4L prison through walk d/t SpO2 readings in high 70s however pt reported only mild shortness of breath and did not appear to be in any distress. Pt placed back in chair following treatment, placed back on 2L O2. SpO2 rechecked in sitting, initially in high 70s but suspected sensor was reading inaccurately. Replaced SpO2 sensor, SpO2 high 90s. Pt reported no pain prior to or following treatment. PT will continue to progress activity as tolerated by pt.

## 2023-10-09 NOTE — PROGRESS NOTES
"  PROGRESS NOTE  Patient Name: Agnieszka Rizzo  Age/Sex: 89 y.o. female  : 1930  MRN: [id number]    Date of Admission: 10/4/2023  Date of Encounter Visit: 10/09/23   LOS: 4 days   Patient Care Team:  Matt Rose MD as PCP - General (Family Medicine)  Marcie Robbins MD as Consulting Physician (Cardiology)  Nilesh Danielle MD as Consulting Physician (Urology)  Lina Morris RPH as Pharmacist  Lev Mckeon PharmD as Pharmacist (Pharmacy)  Rogelio Tucker MD as Consulting Physician (Pulmonary Disease)    Chief Complaint: Bronchopneumonia with bilateral patchy pulmonary infiltrate    Hospital course: Patient does not feel much different compared to yesterday, still on the oxygen, at 2 L/min.  This is day 3 of steroids for presumed cryptogenic organizing pneumonia after she had a completely negative infectious work-up  No GI or  complaints  Her creatinine is trending down again after it peaked to 1.25 yesterday, patient is on Zithromax and Rocephin       REVIEW OF SYSTEMS:   CONSTITUTIONAL: no fever or chills  CARDIOVASCULAR: No chest pain, chest pressure or chest discomfort. No palpitations or edema.   RESPIRATORY: Positive for cough and wheezing   GASTROINTESTINAL: No anorexia, nausea, vomiting or diarrhea. No abdominal pain or blood.   HEMATOLOGIC: No bleeding or bruising.     Ventilator/Non-Invasive Ventilation Settings (From admission, onward)      2 L/min nasal cannula oxygen              Vital Signs  Temp:  [97.5 øF (36.4 øC)-97.7 øF (36.5 øC)] 97.5 øF (36.4 øC)  Heart Rate:  [] 89  Resp:  [16-24] 24  BP: (110-139)/(66-82) 110/68  SpO2:  [90 %-100 %] 94 %  on  Flow (L/min):  [2-2.5] 2 Device (Oxygen Therapy): nasal cannula    Intake/Output Summary (Last 24 hours) at 10/9/2023 1143  Last data filed at 10/9/2023 0838  Gross per 24 hour   Intake 840 ml   Output --   Net 840 ml     Flowsheet Rows      Flowsheet Row First Filed Value   Admission Height 160 cm (63\") Documented at 10/04/2023 1908 "   Admission Weight 55.8 kg (123 lb) Documented at 10/04/2023 1908          Body mass index is 21.56 kg/mý.      10/07/23  0339 10/08/23  0505 10/09/23  0408   Weight: 55.5 kg (122 lb 6.4 oz) 55.1 kg (121 lb 6.4 oz) 55.2 kg (121 lb 11.2 oz)       Physical Exam:  GEN:  No acute distress, alert, cooperative, well developed   EYES:   Sclerae clear. No icterus. PERRL. Normal EOM  ENT:   External ears/nose normal, no oral lesions, no thrush, mucous membranes moist  NECK:  Supple, midline trachea, no JVD  LUNGS: Normal chest on inspection, still having audible crackles, very faint, with minimal expiratory wheezes and good breath sounds bilaterally.  Breathing is nonlabored.  .   CV:  Regular rhythm and rate. Normal S1/S2. No murmurs, gallops, or rubs noted.  ABD:  Soft, nontender and nondistended. Normal bowel sounds. No guarding  EXT:  Moves all extremities well. No cyanosis. No redness. No edema.   Skin: Dry, intact, no bleeding    Results Review:        Results from last 7 days   Lab Units 10/09/23  0418 10/08/23  0518 10/07/23  0436 10/06/23  0359 10/05/23  1859 10/05/23  0352 10/04/23  1953   SODIUM mmol/L 134* 135* 139 142  --  144 140   POTASSIUM mmol/L 3.4* 4.2 3.6 4.8 3.9 3.1* 3.4*   CHLORIDE mmol/L 90* 90* 95* 102  --  102 99   CO2 mmol/L 30.1* 30.9* 30.6* 29.9*  --  31.5* 27.0   BUN mg/dL 47* 36* 22 18  --  24* 31*   CREATININE mg/dL 1.21* 1.25* 0.97 0.94  --  1.01* 1.14*   CALCIUM mg/dL 10.3* 10.2* 9.9* 9.7*  --  9.5 9.3   AST (SGOT) U/L  --  44* 39* 27  --   --  21   ALT (SGPT) U/L  --  34* 25 16  --   --  13   ANION GAP mmol/L 13.9 14.1 13.4 10.1  --  10.5 14.0   ALBUMIN g/dL  --  3.9 3.7 3.6  --   --  3.7     Results from last 7 days   Lab Units 10/04/23  1953   HSTROP T ng/L 31*         Results from last 7 days   Lab Units 10/04/23  1953   PROBNP pg/mL 8,193.0*     Results from last 7 days   Lab Units 10/09/23  0418 10/08/23  0518 10/07/23  0436 10/06/23  0359 10/05/23  0352 10/04/23  1919   WBC 10*3/mm3  "13.94* 9.36 11.48* 12.57* 12.21* 13.91*   HEMOGLOBIN g/dL 11.4* 11.4* 11.4* 10.5* 10.0* 9.6*   HEMATOCRIT % 35.0 34.7 34.3 31.4* 30.0* 29.0*   PLATELETS 10*3/mm3 386 307 281 265 265 278   MCV fL 95.4 96.9 96.3 97.8* 95.8 97.0   NEUTROPHIL % %  --  75.5 64.1  --   --  77.5*   LYMPHOCYTE % %  --  15.7* 19.7  --   --  10.6*   MONOCYTES % %  --  6.6 11.8  --   --  10.1   EOSINOPHIL % %  --  0.0* 2.1  --   --  0.8   BASOPHIL % %  --  0.2 0.7  --   --  0.3   IMM GRAN % %  --  2.0* 1.6*  --   --  0.7*                   Invalid input(s): \"LDLCALC\"          No results found for: \"POCGLU\"  Results from last 7 days   Lab Units 10/07/23  0436 10/05/23  0352   PROCALCITONIN ng/mL 0.21 0.38*         Results from last 7 days   Lab Units 10/04/23  2132   NITRITE UA  Negative   WBC UA /HPF 0-2   BACTERIA UA /HPF None Seen   SQUAM EPITHEL UA /HPF 0-2     Results from last 7 days   Lab Units 10/04/23  2243   COVID19  Not Detected   ADENOVIRUS DETECTION BY PCR  Not Detected   CORONAVIRUS 229E  Not Detected   CORONAVIRUS HKU1  Not Detected   CORONAVIRUS NL63  Not Detected   CORONAVIRUS OC43  Not Detected   HUMAN METAPNEUMOVIRUS  Not Detected   HUMAN RHINOVIRUS/ENTEROVIRUS  Not Detected   INFLUENZA B PCR  Not Detected   PARAINFLUENZA 1  Not Detected   PARAINFLUENZA VIRUS 2  Not Detected   PARAINFLUENZA VIRUS 3  Not Detected   PARAINFLUENZA VIRUS 4  Not Detected   BORDETELLA PERTUSSIS PCR  Not Detected   BORDETELLA PARAPERTUSSIS PCR  Not Detected   CHLAMYDOPHILA PNEUMONIAE PCR  Not Detected   MYCOPLAMA PNEUMO PCR  Not Detected   RSV, PCR  Not Detected               Imaging:   Imaging Results (All)       Procedure Component Value Units Date/Time              I reviewed the patient's new clinical results.  I personally viewed and interpreted the patient's imaging results:        Medication Review:   apixaban, 2.5 mg, Oral, Q12H  azithromycin, 250 mg, Oral, Q24H  budesonide-formoterol, 2 puff, Inhalation, BID - RT  cefTRIAXone, 1,000 mg, " Intravenous, Q24H  cetirizine, 10 mg, Oral, Daily  citalopram, 10 mg, Oral, Daily  ferrous sulfate, 325 mg, Oral, Daily With Breakfast  guaiFENesin, 600 mg, Oral, BID  ipratropium-albuterol, 3 mL, Nebulization, Q6H While Awake - RT  lactobacillus acidophilus, 1 capsule, Oral, Daily  methylPREDNISolone sodium succinate, 60 mg, Intravenous, Q12H  metoprolol tartrate, 100 mg, Oral, Q12H  multivitamin with minerals, 1 tablet, Oral, BID  potassium chloride, 20 mEq, Oral, Daily  potassium chloride, 40 mEq, Oral, Once  sodium chloride, 3 mL, Nebulization, TID             ASSESSMENT:   Acute on chronic hypoxemic respiratory failure, home time oxygen at 2 L/min mainly at night  Bronchopneumonia with bilateral pulmonary infiltrate, patient is on Rocephin in addition to her chronic Zithromax regiment  Suspecting cryptogenic organizing pneumonia  Acute exacerbation of heart failure with preserved EF  Atrial fibrillation on anticoagulation with RVR  Bronchiectasis  History of Aspergillus  Coronary artery disease  Leukocytosis  Suspected organizing pneumonia versus aspiration pneumonia during bronchoscopy    PLAN:  Still on the antibiotic and steroid.  We will go ahead and transition to oral prednisone and will transition to oral antibiotic as well  Decrease in the white blood cell count is likely steroid related, will consider reevaluating if the patient spikes a fever or any other indirect sign of infection  I will recheck another procalcitonin in a.m.  Discussed with the patient and she is agreeable with the above plan  Not ready for discharge yet       Labs/Notes/films were independently reviewed and pertinent results are summarized above  The copied texts in this note were reviewed and they are accurate as of 10/09/23    Disposition: Per primary team    Naman Mckenzie MD  10/09/23  11:43 EDT             Dictated utilizing Dragon dictation

## 2023-10-09 NOTE — PROGRESS NOTES
"      Name: Agnieszka Rizzo ADMIT: 10/4/2023   : 1930  PCP: Matt Rose MD    MRN: [id number] LOS: 4 days   AGE/SEX: 89 y.o. female  ROOM: [facility]     Date of Service: 10/9/2023                        Reason for  follow-up:         Ckd 3, chf, volume overload      Subjective:       Patient sitting up in bed, no acute complaints.  Dyspnea is relatively unchanged.  Urine output 800 mL.       Review of Systems:         Constitutional: weakness.  HEENT:  No headache, otalgia, itchy eyes, nasal discharge or sore throat.  Cardiac:  Shortness of breath somewhat better  Chest:  No cough, phlegm or wheezing.  Abdomen:  No abdominal pain, nausea or vomiting.  Neuro:  No focal weakness, abnormal movements or seizure-like activity.  :   No hematuria, no pyuria, no dysuria, no flank pain.  Extremities:  Edema.  ROS was otherwise negative except as mentioned in the [place].          Objectives:     tMax 24 hrs: Temp (24hrs), Av.6 øF (36.4 øC), Min:97.5 øF (36.4 øC), Max:97.7 øF (36.5 øC)      Vitals Ranges:   Temp:  [97.5 øF (36.4 øC)-97.7 øF (36.5 øC)] 97.5 øF (36.4 øC)  Heart Rate:  [] 102  Resp:  [16-20] 18  BP: (110-139)/(66-82) 110/68    Intake and Output Last 3 Shifts:   I/O last 3 completed shifts:  In: 840 [P.O.:840]  Out: 1700 [Urine:1700]      Exam:     /68 (BP Location: Left arm, Patient Position: Lying)   Pulse 102   Temp 97.5 øF (36.4 øC) (Oral)   Resp 18   Ht 160 cm (62.99\")   Wt 55.2 kg (121 lb 11.2 oz)   SpO2 92%   BMI 21.56 kg/mý     GEN: Pleasant elderly lady in no acute distress  EYES: PERRLA  HEENT: no abnormality  MOUTH: moist Mucous membranes  NECK: no visible JVD  RESP: Mostly clear to auscultation, few crackles at bases  CVS: RRR, S1, S2+, no murmurs/rubs/gallops  ABD: soft, non-tender, non-distended  NEURO: AAOX3  EXT: No edema's        Data Review:       Labs reviewed    Imaging:      Radiology reviewed      Assessment:        Acute on chronic diastolic heart failure    " Primary hypertension    Chronic coronary artery disease    Paroxysmal atrial fibrillation    Acute on chronic diastolic congestive heart failure    CKD stage III secondary to atherosclerotic disease completely bland urine, no hematuria no proteinuria no pyuria CKD secondary to atherosclerotic disease.  Volume overload  Congestive heart failure ejection fraction 56%, LVH, diastolic dysfunction, pulmonary hypertension.  Atrial fibrillation.  Coronary artery disease.  Bronchiectasis.  Chronic anemia.    Acute hypoxic respiratory failure.      Plan:     Renal function slightly better with creatinine stable at 1.21 mg/dL.  Baseline seems around 0.8 to 0.9 mg/dL.  Fluctuations in renal function expected with diuretics and volume control, although labs suggest some degree of ECF contraction  Continue scheduled potassium supplementation.  Presently looks euvolemic  We will continue to hold aggressive IV diuretics.  Repeat a chest x-ray today.  Hypercalcemia:?  Volume depletion due to excess diuretics: We will rule out paraprotein, check PTH, vitamin D.   We will stop cholecalciferol.  Alkalosis stable  Will need to resume maintenance diuretic on discharge with close outpatient monitoring of volume status and electrolyte abnormalities.  Interstitial lung disease: Follow-up with pulmonary  HFpEF: Follow-up with cardiology.    Caterina Cochran MD  [facility]  10/9/2023  10:47 EDT

## 2023-10-09 NOTE — PROGRESS NOTES
"Patient Name: Agnieszka Rizzo  :1930  89 y.o.      Patient Care Team:  Matt Rose MD as PCP - General (Family Medicine)  Marcie Robbins MD as Consulting Physician (Cardiology)  Nilesh Danielle MD as Consulting Physician (Urology)  Lina Morris RPH as Pharmacist  Lev Mckeon PharmD as Pharmacist (Pharmacy)  Rogelio Tucker MD as Consulting Physician (Pulmonary Disease)    Interval History:   Diuretics discontinued.    Subjective:  Following for A-fib    Objective   Vital Signs  Temp:  [97.5 øF (36.4 øC)-97.7 øF (36.5 øC)] 97.5 øF (36.4 øC)  Heart Rate:  [] 102  Resp:  [16-20] 18  BP: (110-139)/(66-82) 110/68    Intake/Output Summary (Last 24 hours) at 10/9/2023 0914  Last data filed at 10/8/2023 2300  Gross per 24 hour   Intake 600 ml   Output 800 ml   Net -200 ml     Flowsheet Rows      Flowsheet Row First Filed Value   Admission Height 160 cm (63\") Documented at 10/04/2023 1908   Admission Weight 55.8 kg (123 lb) Documented at 10/04/2023 1908            Physical Exam:   General Appearance:    Alert, cooperative, in no acute distress   Lungs:   Decreased breath sounds throughout    Heart:  Irregularly irregular   Chest Wall:    No abnormalities observed   Abdomen:     Soft, nontender, positive bowel sounds.     Extremities:   no cyanosis, clubbing or edema.  No marked joint deformities.  Adequate musculoskeletal strength.       Results Review:    Results from last 7 days   Lab Units 10/09/23  0418   SODIUM mmol/L 134*   POTASSIUM mmol/L 3.4*   CHLORIDE mmol/L 90*   CO2 mmol/L 30.1*   BUN mg/dL 47*   CREATININE mg/dL 1.21*   GLUCOSE mg/dL 155*   CALCIUM mg/dL 10.3*     Results from last 7 days   Lab Units 10/04/23  1953   HSTROP T ng/L 31*     Results from last 7 days   Lab Units 10/09/23  0418   WBC 10*3/mm3 13.94*   HEMOGLOBIN g/dL 11.4*   HEMATOCRIT % 35.0   PLATELETS 10*3/mm3 386                         Medication Review:   apixaban, 2.5 mg, Oral, Q12H  azithromycin, 250 mg, Oral, " Q24H  budesonide-formoterol, 2 puff, Inhalation, BID - RT  cefTRIAXone, 1,000 mg, Intravenous, Q24H  cetirizine, 10 mg, Oral, Daily  cholecalciferol, 1,000 Units, Oral, Daily  citalopram, 10 mg, Oral, Daily  ferrous sulfate, 325 mg, Oral, Daily With Breakfast  guaiFENesin, 600 mg, Oral, BID  ipratropium-albuterol, 3 mL, Nebulization, Q6H While Awake - RT  lactobacillus acidophilus, 1 capsule, Oral, Daily  methylPREDNISolone sodium succinate, 60 mg, Intravenous, Q12H  metoprolol tartrate, 100 mg, Oral, Q12H  multivitamin with minerals, 1 tablet, Oral, BID  potassium chloride, 20 mEq, Oral, Daily  sodium chloride, 3 mL, Nebulization, TID              Assessment & Plan     1.  Acute on chronic hypoxic respiratory failure/pneumonia/chronic lung disease.  2.  Acute on chronic diastolic heart failure.  Off IV diuretics.  I recommend resuming home dose of torsemide and potassium at discharge.  3.  Moderate mitral regurgitation.  4.  Persistent atrial fibrillation.  On Eliquis.  5.  Coronary artery disease.  No acute symptoms.  6.  Hypertension.    She is scheduled with an appointment to see me on October 19 and I recommend she keep that appointment.    Marcie Robbins MD, [facility]  10/09/23  09:14 EDT

## 2023-10-10 ENCOUNTER — READMISSION MANAGEMENT (OUTPATIENT)
Dept: CALL CENTER | Facility: HOSPITAL | Age: 88
End: 2023-10-10
Payer: MEDICARE

## 2023-10-10 VITALS
DIASTOLIC BLOOD PRESSURE: 75 MMHG | WEIGHT: 123.6 LBS | BODY MASS INDEX: 21.9 KG/M2 | SYSTOLIC BLOOD PRESSURE: 120 MMHG | TEMPERATURE: 97.9 F | HEART RATE: 76 BPM | HEIGHT: 63 IN | RESPIRATION RATE: 20 BRPM | OXYGEN SATURATION: 100 %

## 2023-10-10 PROBLEM — I50.33 ACUTE ON CHRONIC DIASTOLIC CONGESTIVE HEART FAILURE: Status: RESOLVED | Noted: 2023-10-04 | Resolved: 2023-10-10

## 2023-10-10 PROBLEM — I50.33 ACUTE ON CHRONIC DIASTOLIC HEART FAILURE: Status: RESOLVED | Noted: 2023-10-05 | Resolved: 2023-10-10

## 2023-10-10 LAB
25(OH)D3 SERPL-MCNC: 35.6 NG/ML (ref 30–100)
ANION GAP SERPL CALCULATED.3IONS-SCNC: 11.6 MMOL/L (ref 5–15)
BUN SERPL-MCNC: 46 MG/DL (ref 8–23)
BUN/CREAT SERPL: 50.5 (ref 7–25)
CALCIUM SPEC-SCNC: 10 MG/DL (ref 8.2–9.6)
CHLORIDE SERPL-SCNC: 98 MMOL/L (ref 98–107)
CO2 SERPL-SCNC: 27.4 MMOL/L (ref 22–29)
CREAT SERPL-MCNC: 0.91 MG/DL (ref 0.57–1)
DEPRECATED RDW RBC AUTO: 48.2 FL (ref 37–54)
EGFRCR SERPLBLD CKD-EPI 2021: 58.9 ML/MIN/1.73
ERYTHROCYTE [DISTWIDTH] IN BLOOD BY AUTOMATED COUNT: 13.4 % (ref 12.3–15.4)
GLUCOSE SERPL-MCNC: 141 MG/DL (ref 65–99)
HCT VFR BLD AUTO: 32.7 % (ref 34–46.6)
HGB BLD-MCNC: 10.7 G/DL (ref 12–15.9)
MCH RBC QN AUTO: 31.7 PG (ref 26.6–33)
MCHC RBC AUTO-ENTMCNC: 32.7 G/DL (ref 31.5–35.7)
MCV RBC AUTO: 96.7 FL (ref 79–97)
PLATELET # BLD AUTO: 416 10*3/MM3 (ref 140–450)
PMV BLD AUTO: 10 FL (ref 6–12)
POTASSIUM SERPL-SCNC: 4 MMOL/L (ref 3.5–5.2)
PROCALCITONIN SERPL-MCNC: 0.04 NG/ML (ref 0–0.25)
PTH-INTACT SERPL-MCNC: 58.2 PG/ML (ref 15–65)
RBC # BLD AUTO: 3.38 10*6/MM3 (ref 3.77–5.28)
SODIUM SERPL-SCNC: 137 MMOL/L (ref 136–145)
WBC NRBC COR # BLD: 11.32 10*3/MM3 (ref 3.4–10.8)

## 2023-10-10 PROCEDURE — 94799 UNLISTED PULMONARY SVC/PX: CPT

## 2023-10-10 PROCEDURE — 94618 PULMONARY STRESS TESTING: CPT

## 2023-10-10 PROCEDURE — 84165 PROTEIN E-PHORESIS SERUM: CPT | Performed by: STUDENT IN AN ORGANIZED HEALTH CARE EDUCATION/TRAINING PROGRAM

## 2023-10-10 PROCEDURE — 82784 ASSAY IGA/IGD/IGG/IGM EACH: CPT | Performed by: STUDENT IN AN ORGANIZED HEALTH CARE EDUCATION/TRAINING PROGRAM

## 2023-10-10 PROCEDURE — 99232 SBSQ HOSP IP/OBS MODERATE 35: CPT | Performed by: INTERNAL MEDICINE

## 2023-10-10 PROCEDURE — 63710000001 PREDNISONE PER 1 MG: Performed by: INTERNAL MEDICINE

## 2023-10-10 PROCEDURE — 85027 COMPLETE CBC AUTOMATED: CPT | Performed by: STUDENT IN AN ORGANIZED HEALTH CARE EDUCATION/TRAINING PROGRAM

## 2023-10-10 PROCEDURE — 94669 MECHANICAL CHEST WALL OSCILL: CPT

## 2023-10-10 PROCEDURE — 83970 ASSAY OF PARATHORMONE: CPT | Performed by: STUDENT IN AN ORGANIZED HEALTH CARE EDUCATION/TRAINING PROGRAM

## 2023-10-10 PROCEDURE — 94664 DEMO&/EVAL PT USE INHALER: CPT

## 2023-10-10 PROCEDURE — 94761 N-INVAS EAR/PLS OXIMETRY MLT: CPT

## 2023-10-10 PROCEDURE — 84155 ASSAY OF PROTEIN SERUM: CPT | Performed by: STUDENT IN AN ORGANIZED HEALTH CARE EDUCATION/TRAINING PROGRAM

## 2023-10-10 PROCEDURE — 84145 PROCALCITONIN (PCT): CPT | Performed by: INTERNAL MEDICINE

## 2023-10-10 PROCEDURE — 86334 IMMUNOFIX E-PHORESIS SERUM: CPT | Performed by: STUDENT IN AN ORGANIZED HEALTH CARE EDUCATION/TRAINING PROGRAM

## 2023-10-10 PROCEDURE — 82306 VITAMIN D 25 HYDROXY: CPT | Performed by: STUDENT IN AN ORGANIZED HEALTH CARE EDUCATION/TRAINING PROGRAM

## 2023-10-10 PROCEDURE — 80048 BASIC METABOLIC PNL TOTAL CA: CPT | Performed by: STUDENT IN AN ORGANIZED HEALTH CARE EDUCATION/TRAINING PROGRAM

## 2023-10-10 RX ORDER — TORSEMIDE 20 MG/1
40 TABLET ORAL
Status: DISCONTINUED | OUTPATIENT
Start: 2023-10-10 | End: 2023-10-10 | Stop reason: HOSPADM

## 2023-10-10 RX ORDER — PREDNISONE 10 MG/1
TABLET ORAL
Qty: 77 TABLET | Refills: 0 | Status: SHIPPED | OUTPATIENT
Start: 2023-10-10 | End: 2023-11-14

## 2023-10-10 RX ADMIN — METOPROLOL TARTRATE 100 MG: 50 TABLET, FILM COATED ORAL at 08:36

## 2023-10-10 RX ADMIN — IPRATROPIUM BROMIDE AND ALBUTEROL SULFATE 3 ML: 2.5; .5 SOLUTION RESPIRATORY (INHALATION) at 11:40

## 2023-10-10 RX ADMIN — POTASSIUM CHLORIDE 20 MEQ: 750 TABLET, EXTENDED RELEASE ORAL at 08:36

## 2023-10-10 RX ADMIN — MULTIPLE VITAMINS W/ MINERALS TAB 1 TABLET: TAB at 08:36

## 2023-10-10 RX ADMIN — IPRATROPIUM BROMIDE AND ALBUTEROL SULFATE 3 ML: 2.5; .5 SOLUTION RESPIRATORY (INHALATION) at 07:49

## 2023-10-10 RX ADMIN — FERROUS SULFATE TAB 325 MG (65 MG ELEMENTAL FE) 325 MG: 325 (65 FE) TAB at 08:36

## 2023-10-10 RX ADMIN — APIXABAN 2.5 MG: 2.5 TABLET, FILM COATED ORAL at 08:36

## 2023-10-10 RX ADMIN — PREDNISONE 20 MG: 20 TABLET ORAL at 12:12

## 2023-10-10 RX ADMIN — CETIRIZINE HYDROCHLORIDE 10 MG: 10 TABLET ORAL at 08:36

## 2023-10-10 RX ADMIN — BUDESONIDE AND FORMOTEROL FUMARATE DIHYDRATE 2 PUFF: 160; 4.5 AEROSOL RESPIRATORY (INHALATION) at 07:51

## 2023-10-10 RX ADMIN — TORSEMIDE 40 MG: 20 TABLET ORAL at 08:36

## 2023-10-10 RX ADMIN — ISODIUM CHLORIDE 3 ML: 0.03 SOLUTION RESPIRATORY (INHALATION) at 15:30

## 2023-10-10 RX ADMIN — PREDNISONE 20 MG: 20 TABLET ORAL at 08:36

## 2023-10-10 RX ADMIN — Medication 1 CAPSULE: at 08:36

## 2023-10-10 RX ADMIN — TORSEMIDE 40 MG: 20 TABLET ORAL at 17:50

## 2023-10-10 RX ADMIN — PREDNISONE 20 MG: 20 TABLET ORAL at 17:50

## 2023-10-10 RX ADMIN — BENZONATATE 200 MG: 100 CAPSULE ORAL at 08:36

## 2023-10-10 RX ADMIN — GUAIFENESIN AND DEXTROMETHORPHAN 5 ML: 100; 10 SYRUP ORAL at 00:20

## 2023-10-10 RX ADMIN — CITALOPRAM 10 MG: 10 TABLET, FILM COATED ORAL at 08:36

## 2023-10-10 RX ADMIN — ALBUTEROL SULFATE 2.5 MG: 2.5 SOLUTION RESPIRATORY (INHALATION) at 15:29

## 2023-10-10 RX ADMIN — ISODIUM CHLORIDE 3 ML: 0.03 SOLUTION RESPIRATORY (INHALATION) at 07:50

## 2023-10-10 RX ADMIN — AZITHROMYCIN 250 MG: 250 TABLET, FILM COATED ORAL at 08:37

## 2023-10-10 RX ADMIN — Medication 10 ML: at 08:37

## 2023-10-10 RX ADMIN — GUAIFENESIN 600 MG: 600 TABLET, EXTENDED RELEASE ORAL at 08:36

## 2023-10-10 NOTE — PROGRESS NOTES
Exercise Oximetry    Patient Name:Agnieszka Rizzo   MRN: [id number]   Date: 10/10/23             ROOM AIR BASELINE   SpO2% 92   Heart Rate 97   Blood Pressure      EXERCISE ON ROOM AIR SpO2% EXERCISE ON O2 @  LPM SpO2%   1 MINUTE 92 1 MINUTE    2 MINUTES 92 2 MINUTES    3 MINUTES 93 3 MINUTES    4 MINUTES 90 4 MINUTES    5 MINUTES 90 5 MINUTES    6 MINUTES 92 6 MINUTES               Distance Walked   Distance Walked   Dyspnea ([place] Scale)   Dyspnea (Dago Scale)   Fatigue (Dago Scale)   Fatigue (Dago Scale)   SpO2% Post Exercise  93 SpO2% Post Exercise   HR Post Exercise  93 HR Post Exercise   Time to Recovery  N/A Time to Recovery     Comments: This RT walked with this patient on RA, with finger probe, with a walker, three times around the nurses' station.  This patient did not report any dyspnea or weakness, instead talked throughout the walk how much she was enjoying getting out of her room seeing everyone's faces.  The patient was able to maintain conversation during the walk without any oxygen desaturations, however, did tell this RT that she wears 2L at night as needed.  This RT returned patient to her room, and she sat back into her bedside recliner on RA.  As of this assessment, patient does not require home O2.

## 2023-10-10 NOTE — PROGRESS NOTES
"Patient Name: Agnieszka Rizzo  :1930  89 y.o.      Patient Care Team:  Matt Rose MD as PCP - General (Family Medicine)  Marcie Robbins MD as Consulting Physician (Cardiology)  Nilesh Danielle MD as Consulting Physician (Urology)  Lina Morris RPH as Pharmacist  Lev Mckeon PharmD as Pharmacist (Pharmacy)  Rogelio Tucker MD as Consulting Physician (Pulmonary Disease)    Interval History:   No diuretics yesterday    Subjective:  Following for acute on chronic diastolic heart failure    Objective   Vital Signs  Temp:  [97.5 øF (36.4 øC)-98.1 øF (36.7 øC)] 98.1 øF (36.7 øC)  Heart Rate:  [] 80  Resp:  [18-24] 18  BP: (110-124)/(66-70) 116/70    Intake/Output Summary (Last 24 hours) at 10/10/2023 0739  Last data filed at 10/9/2023 1734  Gross per 24 hour   Intake 1080 ml   Output 0 ml   Net 1080 ml     Flowsheet Rows      Flowsheet Row First Filed Value   Admission Height 160 cm (63\") Documented at 10/04/2023 1908   Admission Weight 55.8 kg (123 lb) Documented at 10/04/2023 1908            Physical Exam:   General Appearance:    Alert, cooperative, in no acute distress   Lungs:     Clear to auscultation.  Normal respiratory effort and rate.      Heart:  Irregularly irregular   Chest Wall:    No abnormalities observed   Abdomen:     Soft, nontender, positive bowel sounds.     Extremities:   no cyanosis, clubbing or edema.  No marked joint deformities.  Adequate musculoskeletal strength.       Results Review:    Results from last 7 days   Lab Units 10/10/23  0359   SODIUM mmol/L 137   POTASSIUM mmol/L 4.0   CHLORIDE mmol/L 98   CO2 mmol/L 27.4   BUN mg/dL 46*   CREATININE mg/dL 0.91   GLUCOSE mg/dL 141*   CALCIUM mg/dL 10.0*     Results from last 7 days   Lab Units 10/04/23  1953   HSTROP T ng/L 31*     Results from last 7 days   Lab Units 10/10/23  0359   WBC 10*3/mm3 11.32*   HEMOGLOBIN g/dL 10.7*   HEMATOCRIT % 32.7*   PLATELETS 10*3/mm3 416                         Medication Review: "   apixaban, 2.5 mg, Oral, Q12H  azithromycin, 250 mg, Oral, Q24H  budesonide-formoterol, 2 puff, Inhalation, BID - RT  cetirizine, 10 mg, Oral, Daily  citalopram, 10 mg, Oral, Daily  ferrous sulfate, 325 mg, Oral, Daily With Breakfast  guaiFENesin, 600 mg, Oral, BID  ipratropium-albuterol, 3 mL, Nebulization, Q6H While Awake - RT  lactobacillus acidophilus, 1 capsule, Oral, Daily  metoprolol tartrate, 100 mg, Oral, Q12H  multivitamin with minerals, 1 tablet, Oral, BID  potassium chloride, 20 mEq, Oral, Daily  predniSONE, 20 mg, Oral, TID With Meals  sodium chloride, 3 mL, Nebulization, TID              Assessment & Plan     1.  Acute on chronic hypoxic respiratory failure/pneumonia/chronic lung disease.  2.  Acute on chronic diastolic heart failure.  Off IV diuretics.  Resume home dose of torsemide  3.  Moderate mitral regurgitation.  4.  Persistent atrial fibrillation.  On Eliquis.  5.  Coronary artery disease.  No acute symptoms.  6.  Hypertension.     She is scheduled with an appointment to see me on October 19 and I recommend she keep that appointment.    Okay with discharge from my standpoint.    Marcie Robbins MD, [facility]  10/10/23  07:39 EDT

## 2023-10-10 NOTE — NURSING NOTE
New order for patient to discharge. Patient has been alert and oriented. No complaints of pain. Vital signs stable. Remains on baseline 2L via nc. Continue with deep nonproductive cough. Discharge nurse will complete discharge.

## 2023-10-10 NOTE — NURSING NOTE
Patient to give a estimated time for discharge transportation when her Daughter calls back. Furthermore, patient request we give discharge instructions to Daughter. AVS printed and in room for when Daughter arrives.

## 2023-10-10 NOTE — PROGRESS NOTES
"      Name: Agnieszka Rizzo ADMIT: 10/4/2023   : 1930  PCP: Matt Rose MD    MRN: [id number] LOS: 5 days   AGE/SEX: 89 y.o. female  ROOM: [facility]     Date of Service: 10/10/2023                        Reason for  follow-up:         Ckd 3, chf, volume overload      Subjective:       Patient sitting up in bed, no acute complaints.  Dyspnea is relatively unchanged.  Urine output not documented       Review of Systems:         Constitutional: weakness.  HEENT:  No headache, otalgia, itchy eyes, nasal discharge or sore throat.  Cardiac:  Shortness of breath somewhat better  Chest:  No cough, phlegm or wheezing.  Abdomen:  No abdominal pain, nausea or vomiting.  Neuro:  No focal weakness, abnormal movements or seizure-like activity.  :   No hematuria, no pyuria, no dysuria, no flank pain.  Extremities:  Edema.  ROS was otherwise negative except as mentioned in the [place].          Objectives:     tMax 24 hrs: Temp (24hrs), Av.8 øF (36.6 øC), Min:97.5 øF (36.4 øC), Max:98.1 øF (36.7 øC)      Vitals Ranges:   Temp:  [97.5 øF (36.4 øC)-98.1 øF (36.7 øC)] 98.1 øF (36.7 øC)  Heart Rate:  [76-94] 80  Resp:  [18-24] 20  BP: (116-124)/(66-70) 116/70    Intake and Output Last 3 Shifts:   I/O last 3 completed shifts:  In: 1320 [P.O.:1320]  Out: 0       Exam:     /70 (BP Location: Right arm, Patient Position: Lying)   Pulse 80   Temp 98.1 øF (36.7 øC) (Oral)   Resp 20   Ht 160 cm (62.99\")   Wt 56.1 kg (123 lb 9.6 oz)   SpO2 100%   BMI 21.90 kg/mý     GEN: Pleasant elderly lady in no acute distress  EYES: PERRLA  HEENT: no abnormality  MOUTH: moist Mucous membranes  NECK: no visible JVD  RESP: Mostly clear to auscultation, few crackles at bases  CVS: RRR, S1, S2+, no murmurs/rubs/gallops  ABD: soft, non-tender, non-distended  NEURO: AAOX3  EXT: No edema's        Data Review:       Labs reviewed    Imaging:      Radiology reviewed      Assessment:        Acute on chronic diastolic heart failure    Primary " hypertension    Chronic coronary artery disease    Paroxysmal atrial fibrillation    Acute on chronic diastolic congestive heart failure    CKD stage III secondary to atherosclerotic disease completely bland urine, no hematuria no proteinuria no pyuria CKD secondary to atherosclerotic disease.  Volume overload  Congestive heart failure ejection fraction 56%, LVH, diastolic dysfunction, pulmonary hypertension.  Atrial fibrillation.  Coronary artery disease.  Bronchiectasis.  Chronic anemia.    Acute hypoxic respiratory failure.      Plan:     Renal function better with creatinine stable at 0.91mg/dL.  Baseline seems around 0.8 to 0.9 mg/dL.  Fluctuations in renal function expected with diuretics and volume control  Continue scheduled potassium supplementation.  Presently looks euvolemic  We will continue to hold aggressive IV diuretics. Continue torsemide 40mg po BID  Chest xray 10/9 with partial improvement  Hypercalcemia:?  Volume depletion due to excess diuretics: We will rule out paraprotein,  PTH 58.2, vitamin D pending  We will stop cholecalciferol.  Alkalosis resolved  Will need to resume maintenance diuretic on discharge with close outpatient monitoring of volume status and electrolyte abnormalities.  Interstitial lung disease: Follow-up with pulmonary  HFpEF: Follow-up with cardiology.      PJ Khanna  [facility]  10/10/2023  09:03 EDT     The note was transcribed by  PJ.  I personally have examined the patient and interviewed the patient and modified the history, exam. I have reviewed the history, data, problems, assessment and plan .and I concur . Exam as described in the Progress note, with comments additions, revisions as noted by me.         [facility]  10/10/2023  10:41 EDT

## 2023-10-10 NOTE — PROGRESS NOTES
"  PROGRESS NOTE  Patient Name: Agnieszka Rizzo  Age/Sex: 89 y.o. female  : 1930  MRN: [id number]    Date of Admission: 10/4/2023  Date of Encounter Visit: 10/10/23   LOS: 5 days   Patient Care Team:  Matt Rose MD as PCP - General (Family Medicine)  Marcie Robbins MD as Consulting Physician (Cardiology)  Nilesh Danielle MD as Consulting Physician (Urology)  Lina Morris RPH as Pharmacist  Lev Mckeon PharmD as Pharmacist (Pharmacy)  Rogelio Tucker MD as Consulting Physician (Pulmonary Disease)    Chief Complaint: Bronchopneumonia with bilateral patchy pulmonary infiltrate    Hospital course: Patient does not feel much different compared to yesterday, still on the oxygen, at 2 L/min.         REVIEW OF SYSTEMS:   CONSTITUTIONAL: no fever or chills  CARDIOVASCULAR: No chest pain, chest pressure or chest discomfort. No palpitations or edema.   RESPIRATORY: Positive for cough and wheezing   GASTROINTESTINAL: No anorexia, nausea, vomiting or diarrhea. No abdominal pain or blood.   HEMATOLOGIC: No bleeding or bruising.     Ventilator/Non-Invasive Ventilation Settings (From admission, onward)      2 L/min nasal cannula oxygen              Vital Signs  Temp:  [97.5 øF (36.4 øC)-98.1 øF (36.7 øC)] 97.9 øF (36.6 øC)  Heart Rate:  [74-94] 74  Resp:  [18-24] 20  BP: (116-124)/(66-75) 120/75  SpO2:  [97 %-100 %] 100 %  on  Flow (L/min):  [2] 2 Device (Oxygen Therapy): nasal cannula    Intake/Output Summary (Last 24 hours) at 10/10/2023 1355  Last data filed at 10/10/2023 0940  Gross per 24 hour   Intake 1100 ml   Output 0 ml   Net 1100 ml     Flowsheet Rows      Flowsheet Row First Filed Value   Admission Height 160 cm (63\") Documented at 10/04/2023 1908   Admission Weight 55.8 kg (123 lb) Documented at 10/04/2023 1908          Body mass index is 21.9 kg/mý.      10/08/23  0505 10/09/23  0408 10/10/23  0551   Weight: 55.1 kg (121 lb 6.4 oz) 55.2 kg (121 lb 11.2 oz) 56.1 kg (123 lb 9.6 oz)       Physical " Exam:  GEN:  No acute distress, alert, cooperative, well developed   EYES:   Sclerae clear. No icterus. PERRL. Normal EOM  ENT:   External ears/nose normal, no oral lesions, no thrush, mucous membranes moist  NECK:  Supple, midline trachea, no JVD  LUNGS: Normal chest on inspection, no audible crackles, no wheezes.   CV:  Regular rhythm and rate. Normal S1/S2. No murmurs, gallops, or rubs noted.  ABD:  Soft, nontender and nondistended. Normal bowel sounds. No guarding  EXT:  Moves all extremities well. No cyanosis. No redness. No edema.   Skin: Dry, intact, no bleeding    Results Review:        Results from last 7 days   Lab Units 10/10/23  0359 10/09/23  0418 10/08/23  0518 10/07/23  0436 10/06/23  0359 10/05/23  1859 10/05/23  0352 10/04/23  1953   SODIUM mmol/L 137 134* 135* 139 142  --  144 140   POTASSIUM mmol/L 4.0 3.4* 4.2 3.6 4.8 3.9 3.1* 3.4*   CHLORIDE mmol/L 98 90* 90* 95* 102  --  102 99   CO2 mmol/L 27.4 30.1* 30.9* 30.6* 29.9*  --  31.5* 27.0   BUN mg/dL 46* 47* 36* 22 18  --  24* 31*   CREATININE mg/dL 0.91 1.21* 1.25* 0.97 0.94  --  1.01* 1.14*   CALCIUM mg/dL 10.0* 10.3* 10.2* 9.9* 9.7*  --  9.5 9.3   AST (SGOT) U/L  --   --  44* 39* 27  --   --  21   ALT (SGPT) U/L  --   --  34* 25 16  --   --  13   ANION GAP mmol/L 11.6 13.9 14.1 13.4 10.1  --  10.5 14.0   ALBUMIN g/dL  --   --  3.9 3.7 3.6  --   --  3.7     Results from last 7 days   Lab Units 10/04/23  1953   HSTROP T ng/L 31*         Results from last 7 days   Lab Units 10/04/23  1953   PROBNP pg/mL 8,193.0*     Results from last 7 days   Lab Units 10/10/23  0359 10/09/23  0418 10/08/23  0518 10/07/23  0436 10/06/23  0359 10/05/23  0352 10/04/23  1919   WBC 10*3/mm3 11.32* 13.94* 9.36 11.48* 12.57* 12.21* 13.91*   HEMOGLOBIN g/dL 10.7* 11.4* 11.4* 11.4* 10.5* 10.0* 9.6*   HEMATOCRIT % 32.7* 35.0 34.7 34.3 31.4* 30.0* 29.0*   PLATELETS 10*3/mm3 416 386 307 281 265 265 278   MCV fL 96.7 95.4 96.9 96.3 97.8* 95.8 97.0   NEUTROPHIL % %  --   --   "75.5 64.1  --   --  77.5*   LYMPHOCYTE % %  --   --  15.7* 19.7  --   --  10.6*   MONOCYTES % %  --   --  6.6 11.8  --   --  10.1   EOSINOPHIL % %  --   --  0.0* 2.1  --   --  0.8   BASOPHIL % %  --   --  0.2 0.7  --   --  0.3   IMM GRAN % %  --   --  2.0* 1.6*  --   --  0.7*                   Invalid input(s): \"LDLCALC\"          No results found for: \"POCGLU\"  Results from last 7 days   Lab Units 10/10/23  0359 10/07/23  0436 10/05/23  0352   PROCALCITONIN ng/mL 0.04 0.21 0.38*         Results from last 7 days   Lab Units 10/04/23  2132   NITRITE UA  Negative   WBC UA /HPF 0-2   BACTERIA UA /HPF None Seen   SQUAM EPITHEL UA /HPF 0-2     Results from last 7 days   Lab Units 10/04/23  2243   COVID19  Not Detected   ADENOVIRUS DETECTION BY PCR  Not Detected   CORONAVIRUS 229E  Not Detected   CORONAVIRUS HKU1  Not Detected   CORONAVIRUS NL63  Not Detected   CORONAVIRUS OC43  Not Detected   HUMAN METAPNEUMOVIRUS  Not Detected   HUMAN RHINOVIRUS/ENTEROVIRUS  Not Detected   INFLUENZA B PCR  Not Detected   PARAINFLUENZA 1  Not Detected   PARAINFLUENZA VIRUS 2  Not Detected   PARAINFLUENZA VIRUS 3  Not Detected   PARAINFLUENZA VIRUS 4  Not Detected   BORDETELLA PERTUSSIS PCR  Not Detected   BORDETELLA PARAPERTUSSIS PCR  Not Detected   CHLAMYDOPHILA PNEUMONIAE PCR  Not Detected   MYCOPLAMA PNEUMO PCR  Not Detected   RSV, PCR  Not Detected               Imaging:   Imaging Results (All)       Procedure Component Value Units Date/Time              I reviewed the patient's new clinical results.  I personally viewed and interpreted the patient's imaging results:        Medication Review:   apixaban, 2.5 mg, Oral, Q12H  azithromycin, 250 mg, Oral, Q24H  budesonide-formoterol, 2 puff, Inhalation, BID - RT  cetirizine, 10 mg, Oral, Daily  citalopram, 10 mg, Oral, Daily  ferrous sulfate, 325 mg, Oral, Daily With Breakfast  guaiFENesin, 600 mg, Oral, BID  ipratropium-albuterol, 3 mL, Nebulization, Q6H While Awake - RT  lactobacillus " acidophilus, 1 capsule, Oral, Daily  metoprolol tartrate, 100 mg, Oral, Q12H  multivitamin with minerals, 1 tablet, Oral, BID  potassium chloride, 20 mEq, Oral, Daily  predniSONE, 20 mg, Oral, TID With Meals  sodium chloride, 3 mL, Nebulization, TID  torsemide, 40 mg, Oral, BID             ASSESSMENT:   Acute on chronic hypoxemic respiratory failure, home time oxygen at 2 L/min mainly at night  Bronchopneumonia with bilateral pulmonary infiltrate, patient is on Rocephin in addition to her chronic Zithromax regiment  Suspecting cryptogenic organizing pneumonia  Acute exacerbation of heart failure with preserved EF  Atrial fibrillation on anticoagulation with RVR  Bronchiectasis  History of Aspergillus  Coronary artery disease  Leukocytosis  Suspected organizing pneumonia versus aspiration pneumonia during bronchoscopy    PLAN:  Still on the antibiotic and steroid.  We will discharge home on the prednisone at 40 mg and taper slowly over the following 5 weeks  She is off the IV antibiotic currently only on p.o. Zithromax    Clinical response so far is consistent with organizing pneumonia specially with negative infection work-up    Labs/Notes/films were independently reviewed and pertinent results are summarized above  The copied texts in this note were reviewed and they are accurate as of 10/10/23    Disposition: Per primary team    Naman Mckenzie MD  10/10/23  13:55 EDT             Dictated utilizing Dragon dictation

## 2023-10-10 NOTE — PLAN OF CARE
Problem: Adult Inpatient Plan of Care  Goal: Plan of Care Review  Outcome: Met  Goal: Patient-Specific Goal (Individualized)  Outcome: Met  Goal: Absence of Hospital-Acquired Illness or Injury  Outcome: Met  Goal: Optimal Comfort and Wellbeing  Outcome: Met  Goal: Readiness for Transition of Care  Outcome: Met     Problem: Skin Injury Risk Increased  Goal: Skin Health and Integrity  Outcome: Met     Problem: Fall Injury Risk  Goal: Absence of Fall and Fall-Related Injury  Outcome: Met     Problem: Adjustment to Illness (Heart Failure)  Goal: Optimal Coping  Outcome: Met     Problem: Cardiac Output Decreased (Heart Failure)  Goal: Optimal Cardiac Output  Outcome: Met     Problem: Dysrhythmia (Heart Failure)  Goal: Stable Heart Rate and Rhythm  Outcome: Met     Problem: Fluid Imbalance (Heart Failure)  Goal: Fluid Balance  Outcome: Met     Problem: Functional Ability Impaired (Heart Failure)  Goal: Optimal Functional Ability  Outcome: Met     Problem: Oral Intake Inadequate (Heart Failure)  Goal: Optimal Nutrition Intake  Outcome: Met     Problem: Respiratory Compromise (Heart Failure)  Goal: Effective Oxygenation and Ventilation  Outcome: Met     Problem: Sleep Disordered Breathing (Heart Failure)  Goal: Effective Breathing Pattern During Sleep  Outcome: Met   Goal Outcome Evaluation:

## 2023-10-10 NOTE — PLAN OF CARE
Goal Outcome Evaluation:  Plan of Care Reviewed With: patient        Progress: improving  Outcome Evaluation: Pt has rested well this shift with less interuptions in sleep. Pt remains on 2L which is home dose. Pt has been AOx4. Cough little better no Lasix this shift. CTM, safety maintained.       Diuretic in Use: none  Response to Diuretics (Output greater than intake):   Daily Weight (up or down): up (diuretics last 24hr.)  O2 Requirements: 2L  Functional Status (Activity level, tolerance and respiratory symptoms):   Discharge Plans:  TBD

## 2023-10-10 NOTE — PROGRESS NOTES
Name: Agnieszka Rizzo ADMIT: 10/4/2023   : 1930  PCP: Matt Rose MD    MRN: [id number] LOS: 5 days   AGE/SEX: 89 y.o. female  ROOM: [facility]     Subjective   Subjective     No events overnight. Creatinine has normalized.        Objective   Objective   Vital Signs  Temp:  [97.5 øF (36.4 øC)-98.1 øF (36.7 øC)] 97.9 øF (36.6 øC)  Heart Rate:  [74-94] 74  Resp:  [18-24] 20  BP: (116-124)/(66-75) 120/75  SpO2:  [97 %-100 %] 100 %  on  Flow (L/min):  [2] 2;   Device (Oxygen Therapy): nasal cannula  Body mass index is 21.9 kg/mý.  Physical Exam  Constitutional:       General: She is not in acute distress.     Appearance: She is not toxic-appearing.   Cardiovascular:      Rate and Rhythm: Normal rate and regular rhythm.      Heart sounds: Normal heart sounds.   Pulmonary:      Effort: Pulmonary effort is normal. No respiratory distress.      Breath sounds: Normal breath sounds. No wheezing, rhonchi or rales.   Abdominal:      General: Bowel sounds are normal. There is no distension.      Palpations: Abdomen is soft.      Tenderness: There is no abdominal tenderness. There is no guarding or rebound.   Musculoskeletal:         General: No tenderness.      Right lower leg: No edema.      Left lower leg: No edema.   Neurological:      Mental Status: She is alert.   Psychiatric:         Mood and Affect: Mood normal.         Behavior: Behavior normal.         Results Review     I reviewed the patient's new clinical results.  Results from last 7 days   Lab Units 10/10/23  0359 10/09/23  0418 10/08/23  0518 10/07/23  0436   WBC 10*3/mm3 11.32* 13.94* 9.36 11.48*   HEMOGLOBIN g/dL 10.7* 11.4* 11.4* 11.4*   PLATELETS 10*3/mm3 416 386 307 281     Results from last 7 days   Lab Units 10/10/23  0359 10/09/23  0418 10/08/23  0518 10/07/23  0436   SODIUM mmol/L 137 134* 135* 139   POTASSIUM mmol/L 4.0 3.4* 4.2 3.6   CHLORIDE mmol/L 98 90* 90* 95*   CO2 mmol/L 27.4 30.1* 30.9* 30.6*   BUN mg/dL 46* 47* 36* 22   CREATININE  "mg/dL 0.91 1.21* 1.25* 0.97   GLUCOSE mg/dL 141* 155* 148* 111*   Estimated Creatinine Clearance: 34.2 mL/min (by C-G formula based on SCr of 0.91 mg/dL).  Results from last 7 days   Lab Units 10/08/23  0518 10/07/23  0436 10/06/23  0359 10/04/23  1953   ALBUMIN g/dL 3.9 3.7 3.6 3.7   BILIRUBIN mg/dL 0.5 0.8 0.7 0.4   ALK PHOS U/L 120* 119* 106 114   AST (SGOT) U/L 44* 39* 27 21   ALT (SGPT) U/L 34* 25 16 13     Results from last 7 days   Lab Units 10/10/23  0359 10/09/23  0418 10/08/23  0518 10/07/23  0436 10/06/23  0359 10/05/23  0352 10/04/23  1953   CALCIUM mg/dL 10.0* 10.3* 10.2* 9.9* 9.7*   < > 9.3   ALBUMIN g/dL  --   --  3.9 3.7 3.6  --  3.7    < > = values in this interval not displayed.     Results from last 7 days   Lab Units 10/10/23  0359 10/07/23  0436 10/05/23  0352   PROCALCITONIN ng/mL 0.04 0.21 0.38*     COVID19   Date Value Ref Range Status   10/04/2023 Not Detected Not Detected - Ref. Range Final   08/04/2023 Not Detected Not Detected - Ref. Range Final   04/03/2023 Not Detected Not Detected - Ref. Range Final   07/15/2022 Not Detected Not Detected - Ref. Range Final   07/09/2022 Not Detected Not Detected - Ref. Range Final     No results found for: \"HGBA1C\", \"POCGLU\"    XR Chest 1 View  Narrative: XR CHEST 1 VW-     HISTORY: Female who is 89 years-old, reassess fluid overload     TECHNIQUE: Frontal view of the chest     COMPARISON: 10/7/2023     FINDINGS: The heart is enlarged, with mild prominence of vascular  markings. Aorta is tortuous, calcified. No previous pulmonary opacities  show interval decrease. No pleural effusion, or pneumothorax. No acute  osseous process.     Impression: Partial improvement.           This report was finalized on 10/9/2023 3:09 PM by Dr. Steven Garza M.D on Workstation: [id number]       Scheduled Medications  apixaban, 2.5 mg, Oral, Q12H  azithromycin, 250 mg, Oral, Q24H  budesonide-formoterol, 2 puff, Inhalation, BID - RT  cetirizine, 10 mg, Oral, " Daily  citalopram, 10 mg, Oral, Daily  ferrous sulfate, 325 mg, Oral, Daily With Breakfast  guaiFENesin, 600 mg, Oral, BID  ipratropium-albuterol, 3 mL, Nebulization, Q6H While Awake - RT  lactobacillus acidophilus, 1 capsule, Oral, Daily  metoprolol tartrate, 100 mg, Oral, Q12H  multivitamin with minerals, 1 tablet, Oral, BID  potassium chloride, 20 mEq, Oral, Daily  predniSONE, 20 mg, Oral, TID With Meals  sodium chloride, 3 mL, Nebulization, TID  torsemide, 40 mg, Oral, BID    Infusions   Diet  Diet: Cardiac Diets; Healthy Heart (2-3 Na+); Texture: Regular Texture (IDDSI 7); Fluid Consistency: Thin (IDDSI 0)       Assessment/Plan     Active Hospital Problems    Diagnosis  POA    **Acute on chronic diastolic heart failure [I50.33]  Yes    Acute on chronic diastolic congestive heart failure [I50.33]  Yes    Paroxysmal atrial fibrillation [I48.0]  Yes    Primary hypertension [I10]  Yes    Chronic coronary artery disease [I25.10]  Yes      Resolved Hospital Problems   No resolved problems to display.       89 y.o. female admitted with Acute on chronic diastolic heart failure.    Acute on chronic respiratory failure with hypoxia-she completed a course of ceftriaxone for possible bacterial pneumonia, but pulmonology suspects organizing pneumonia and she is on steroids  Acute on chronic diastolic heart failure-s/p IV diuresis. Now on torsemide  CKD 3a-creatinine has normalized  Hypercalcemia-pth inappropriately normal. Vit d was normal. Serum electrophoresis and immunofixation pending  Persistent afib-metoprolol, eliquis  Bronchiectasis-on chronic azithromycin  Coronary artery disease-no chest pain  Essential hypertension-well controlled  Eliquis (home med) for DVT prophylaxis.  Limited code (no CPR, no intubation).  Discussed with patient and nursing staff.  Anticipate discharge home with home health when cleared by consultants.      Jonnie Daniels MD  [facility]  10/10/23  14:45 EDT    I wore  protective equipment throughout this patient encounter including a face mask, gloves and protective eyewear.  Hand hygiene was performed before donning protective equipment and after removal when leaving the room.

## 2023-10-10 NOTE — DISCHARGE SUMMARY
Patient Name: Agnieszka Rizzo  : 1930  MRN: [id number]    Date of Admission: 10/4/2023  Date of Discharge:  10/10/2023  Primary Care Physician: Matt Rose MD      Chief Complaint:   Shortness of Breath      Discharge Diagnoses     Active Hospital Problems    Diagnosis  POA    Paroxysmal atrial fibrillation [I48.0]  Yes    Primary hypertension [I10]  Yes    Chronic coronary artery disease [I25.10]  Yes      Resolved Hospital Problems    Diagnosis Date Resolved POA    **Acute on chronic diastolic heart failure [I50.33] 10/10/2023 Yes    Acute on chronic diastolic congestive heart failure [I50.33] 10/10/2023 Yes        Hospital Course     Ms. Rizzo is a 89 y.o. female with a history of chronic diastolic heart failure, CKD 3A, persistent A-fib on Eliquis, bronchiectasis on chronic azithromycin, coronary artery disease, essential hypertension who had undergone bronchoscopy with BAL just 2 days prior to admissoin presented to [facility] initially complaining of shortness of breath.  Please see the admitting history and physical for further details.  She was found to have acute on chronic respiratory failure with hypoxia, acute on chronic diastolic heart failure, and pneumonia and was admitted to the hospital for further evaluation and treatment.      She was seen in consultation by nephrology, cardiology, and pulmonology and she was started on IV diuretics, antibiotics, but pulmonology suspected  and so also started steroids. She had clinical improvement after several days of therapy and she completed a course of abx here in the hospital. She has been transitioned to oral diuretics and cleared for discharge by all. Pulmonology feels that  is the most likely diagnosis as opposed to a bacterial pneumonia and so she will be discharged on a several week long steroid taper (40 mg daily for 7 days, followed by 30 mg daily for 7 days, followed by 20 mg daily for 7 days, followed by 10 mg  daily) until follow up with pulmonology.     Day of Discharge     Subjective:  No events overnight. Her renal function has recovered entirely and oral diuretics have been restarted. She has been cleared by all for discharge.    Physical Exam:  Temp:  [97.5 øF (36.4 øC)-98.1 øF (36.7 øC)] 97.9 øF (36.6 øC)  Heart Rate:  [74-94] 74  Resp:  [18-20] 20  BP: (116-124)/(66-75) 120/75  Body mass index is 21.9 kg/mý.  Physical Exam  Constitutional:       General: She is not in acute distress.     Appearance: She is not toxic-appearing.   Cardiovascular:      Rate and Rhythm: Normal rate and regular rhythm.      Heart sounds: Normal heart sounds.   Pulmonary:      Effort: Pulmonary effort is normal.      Breath sounds: Normal breath sounds.   Abdominal:      General: Bowel sounds are normal.      Palpations: Abdomen is soft.   Musculoskeletal:         General: No tenderness.      Right lower leg: No edema.      Left lower leg: No edema.   Neurological:      Mental Status: She is alert.   Psychiatric:         Mood and Affect: Mood normal.         Behavior: Behavior normal.         Consultants     Consult Orders (all) (From admission, onward)       Start     Ordered    10/06/23 1657  Inpatient Nephrology Consult  Once        Specialty:  Nephrology  Provider:  Cody Ayala MD    10/06/23 1657    10/05/23 0058  Inpatient Pulmonology Consult  Once        Specialty:  Pulmonary Disease  Provider:  Rogelio Tucker MD    10/05/23 0057    10/05/23 0053  Inpatient Cardiology Consult  Once        Specialty:  Cardiology  Provider:  Marcie Robbins MD    10/05/23 0055    10/04/23 2041  A (on-call MD unless specified) Details  Once,   Status:  Canceled        Specialty:  Hospitalist  Provider:  (Not yet assigned)    10/04/23 2040                  Procedures     Imaging Results (All)       Procedure Component Value Units Date/Time    XR Chest 1 View [[id number]] Collected: 10/09/23 1507     Updated: 10/09/23 1512    Narrative:      XR  CHEST 1 VW-     HISTORY: Female who is 89 years-old, reassess fluid overload     TECHNIQUE: Frontal view of the chest     COMPARISON: 10/7/2023     FINDINGS: The heart is enlarged, with mild prominence of vascular  markings. Aorta is tortuous, calcified. No previous pulmonary opacities  show interval decrease. No pleural effusion, or pneumothorax. No acute  osseous process.       Impression:      Partial improvement.           This report was finalized on 10/9/2023 3:09 PM by Dr. Steven Garza M.D on Workstation: [id number]       XR Chest 1 View [[id number]] Collected: 10/07/23 1407     Updated: 10/07/23 1725    Narrative:      PORTABLE CHEST     HISTORY: Shortness of air, CHF.     COMPARISON: Chest x-ray 10/04/2023.     FINDINGS: There is moderate cardiomegaly. There is patchy infiltrate  appreciated involving the left upper lobe laterally which is more  prominent as compared to 10/04/2023. Right perihilar and infrahilar  atelectasis/infiltrate is appreciated which is similar to slightly more  prominent as compared to the prior examination. There is no evidence of  consolidation or effusion.       Impression:      Cardiomegaly and increasing infiltrates are noted as  described above.     This report was finalized on 10/7/2023 5:22 PM by Dr. Lon Velasquez M.D  on Workstation: [id number]       CT Chest Without Contrast Diagnostic [[id number]] Collected: 10/06/23 1300     Updated: 10/06/23 1310    Narrative:      CT CHEST WO CONTRAST DIAGNOSTIC-     Radiation dose reduction techniques were utilized, including automated  exposure control and exposure modulation based on body size.     Clinical: Cough and shortness of breath, chronic heart failure     COMPARISON 9/20/2023     FINDINGS:  1. There is gross cardiac enlargement, heart size appears perhaps  slightly smaller compared to 9/20/2023. No pericardial effusion seen.  The esophagus is satisfactory in course and caliber. There is  mediastinal lymphadenopathy and the  lymph nodes appear quite similar in  size and number compared to the previous examination. Small pericardial  effusion.     2. There are multifocal patchy areas of groundglass infiltrate and  nodular consolidation demonstrated within both lungs, the greatest  amount of airspace disease is demonstrated within the right lower lobe.  The largest nodular focus of consolidation is within the left upper lobe  measuring 2.6 cm. The overall appearance is consistent with atypical  pneumonia, potential viral (COVID-pneumonia). Short-term follow-up CT  advised.     3. Limited images through the upper abdomen demonstrate hepatic and left  renal cysts. No pleural effusion demonstrated.        This report was finalized on 10/6/2023 1:07 PM by Dr. Stan Rodriguez M.D  on Workstation: [id number]       XR Chest 1 View [[id number]] Collected: 10/04/23 2002     Updated: 10/04/23 2031    Narrative:      CHEST SINGLE VIEW     HISTORY: CHF, COPD. Follow-up exam     COMPARISON: CT chest 09/20/2023, AP chest 08/04/2023, PA and lateral  chest 04/098/2023     FINDINGS: High resolution chest CT 09/20/2023 demonstrated right middle  lobe bronchiectasis with partial collapse of the right middle lobe and   this is without evidence for change. There are chronic appearing  increased interstitial markings and there is subtle hazy increased  opacity within the left midlung and right base potentially due to mild  superimposed infiltrate. It would be difficult to exclude asymmetric  areas of pulmonary edema. Cardiomediastinal silhouette is not changed.  Aortic vascular calcifications and cardiac monitor and leads are  present.       Impression:      Chronic appearing increased interstitial markings with  subtle hazy increased opacity within the left midlung and right base  potentially due to superimposed infiltrate. Asymmetric areas of  pulmonary edema are also a consideration. The heart size is enlarged,  similar to the previous exam.     This report  "was finalized on 10/4/2023 8:28 PM by Dr. Ced Bonilla M.D on Workstation: [id number]               Pertinent Labs     Results from last 7 days   Lab Units 10/10/23  0359 10/09/23  0418 10/08/23  0518 10/07/23  0436   WBC 10*3/mm3 11.32* 13.94* 9.36 11.48*   HEMOGLOBIN g/dL 10.7* 11.4* 11.4* 11.4*   PLATELETS 10*3/mm3 416 386 307 281     Results from last 7 days   Lab Units 10/10/23  0359 10/09/23  0418 10/08/23  0518 10/07/23  0436   SODIUM mmol/L 137 134* 135* 139   POTASSIUM mmol/L 4.0 3.4* 4.2 3.6   CHLORIDE mmol/L 98 90* 90* 95*   CO2 mmol/L 27.4 30.1* 30.9* 30.6*   BUN mg/dL 46* 47* 36* 22   CREATININE mg/dL 0.91 1.21* 1.25* 0.97   GLUCOSE mg/dL 141* 155* 148* 111*   Estimated Creatinine Clearance: 34.2 mL/min (by C-G formula based on SCr of 0.91 mg/dL).  Results from last 7 days   Lab Units 10/08/23  0518 10/07/23  0436 10/06/23  0359 10/04/23  1953   ALBUMIN g/dL 3.9 3.7 3.6 3.7   BILIRUBIN mg/dL 0.5 0.8 0.7 0.4   ALK PHOS U/L 120* 119* 106 114   AST (SGOT) U/L 44* 39* 27 21   ALT (SGPT) U/L 34* 25 16 13     Results from last 7 days   Lab Units 10/10/23  0359 10/09/23  0418 10/08/23  0518 10/07/23  0436 10/06/23  0359 10/05/23  0352 10/04/23  1953   CALCIUM mg/dL 10.0* 10.3* 10.2* 9.9* 9.7*   < > 9.3   ALBUMIN g/dL  --   --  3.9 3.7 3.6  --  3.7    < > = values in this interval not displayed.       Results from last 7 days   Lab Units 10/04/23  1953   HSTROP T ng/L 31*   PROBNP pg/mL 8,193.0*           Invalid input(s): \"LDLCALC\"        Test Results Pending at Discharge     Pending Labs       Order Current Status    KENDRA + PE In process            Discharge Details        Discharge Medications        New Medications        Instructions Start Date   predniSONE 10 MG tablet  Commonly known as: DELTASONE   Take 4 tablets by mouth Daily for 7 days, THEN 3 tablets Daily for 7 days, THEN 2 tablets Daily for 7 days, THEN 1 tablet Daily for 14 days.   Start Date: October 10, 2023            Changes to Medications "        Instructions Start Date   FeroSul 325 (65 Fe) MG tablet  Generic drug: ferrous sulfate  What changed:   how much to take  when to take this   TAKE ONE TABLET BY MOUTH DAILY WITH BREAKFAST      metoprolol tartrate 100 MG tablet  Commonly known as: LOPRESSOR  What changed: Another medication with the same name was removed. Continue taking this medication, and follow the directions you see here.   100 mg, Oral, Every Morning             Continue These Medications        Instructions Start Date   acetaminophen 325 MG tablet  Commonly known as: TYLENOL   650 mg, Oral, Every 4 Hours PRN      albuterol sulfate  (90 Base) MCG/ACT inhaler  Commonly known as: PROVENTIL HFA;VENTOLIN HFA;PROAIR HFA   2 puffs, Inhalation, Every 6 Hours PRN      apixaban 2.5 MG tablet tablet  Commonly known as: ELIQUIS   2.5 mg, Oral, Every 12 Hours Scheduled      azithromycin 250 MG tablet  Commonly known as: ZITHROMAX   250 mg, Oral, Daily      benzonatate 200 MG capsule  Commonly known as: TESSALON   1 capsule, Oral, 3 Times Daily PRN      cholecalciferol 25 MCG (1000 UT) tablet  Commonly known as: VITAMIN D3   1,000 Units, Oral, Daily      citalopram 10 MG tablet  Commonly known as: CeleXA   10 mg, Oral, Daily      fexofenadine 180 MG tablet  Commonly known as: ALLEGRA   180 mg, Oral, Daily      Florajen Acidophilus capsule   1 tablet, Oral, Daily      fluticasone 50 MCG/ACT nasal spray  Commonly known as: FLONASE   2 sprays, Nasal, Nightly PRN      fluticasone-salmeterol 115-21 MCG/ACT inhaler  Commonly known as: ADVAIR HFA   2 puffs, Inhalation, 2 Times Daily - RT      guaiFENesin 600 MG 12 hr tablet  Commonly known as: MUCINEX   600 mg, Oral, 2 Times Daily      ipratropium-albuterol 0.5-2.5 mg/3 ml nebulizer  Commonly known as: DUO-NEB   3 mL, Nebulization, 2 Times Daily      multivitamin with minerals tablet tablet   1 tablet, Oral, 2 Times Daily      potassium chloride 20 MEQ CR tablet  Commonly known as: K-DUR,KLOR-CON    20 mEq, Oral, Daily      sodium chloride 0.9 % nebulizer solution   3 mL, Nebulization, 2 Times Daily      torsemide 20 MG tablet  Commonly known as: DEMADEX   40 mg, Oral, 2 Times Daily               Allergies   Allergen Reactions    Amlodipine Besylate Swelling    Aspirin GI Intolerance     Caused bleeding ulcers    Bactrim [Sulfamethoxazole-Trimethoprim] Nausea And Vomiting    Erythromycin Unknown (See Comments)     Patient does not recall type of reaction.    Levaquin [Levofloxacin] Unknown (See Comments)     Nausea      Nitrofurantoin Nausea Only and Nausea And Vomiting    Ramipril Other (See Comments)     Cough         Discharge Disposition:  Home or Self Care    Discharge Diet:  Diet Order   Procedures    Diet: Cardiac Diets; Healthy Heart (2-3 Na+); Texture: Regular Texture (IDDSI 7); Fluid Consistency: Thin (IDDSI 0)       Discharge Activity:   Activity Instructions       Activity as Tolerated              CODE STATUS:    Code Status and Medical Interventions:   Ordered at: 10/04/23 2112     Medical Intervention Limits:    NO intubation (DNI)    NO antiarrhythmic drugs     Code Status (Patient has no pulse and is not breathing):    No CPR (Do Not Attempt to Resuscitate)     Medical Interventions (Patient has pulse or is breathing):    Limited Support       Future Appointments   Date Time Provider Department Center   10/19/2023 10:40 AM Marcie Robbins MD [facility]     Additional Instructions for the Follow-ups that You Need to Schedule       Call MD With Problems / Concerns   As directed      Instructions: return to the hospital if you experience chest pain, shortness of breath, abdominal pain, nausea, vomiting, fevers, sweats, chills, or worsening of your symptoms    Order Comments: Instructions: return to the hospital if you experience chest pain, shortness of breath, abdominal pain, nausea, vomiting, fevers, sweats, chills, or worsening of your symptoms         Discharge Follow-up with  Specialty: pulmonology 2-4 weeks or as otherwise directed   As directed      Specialty: pulmonology 2-4 weeks or as otherwise directed        Discharge Follow-up with Specified Provider: cardiology, october 19th   As directed      To: cardiology, october 19th               Follow-up Information       Matt Rose MD .    Specialty: Family Medicine  Contact information:  [id number] [id number] [phone]                             Additional Instructions for the Follow-ups that You Need to Schedule       Call MD With Problems / Concerns   As directed      Instructions: return to the hospital if you experience chest pain, shortness of breath, abdominal pain, nausea, vomiting, fevers, sweats, chills, or worsening of your symptoms    Order Comments: Instructions: return to the hospital if you experience chest pain, shortness of breath, abdominal pain, nausea, vomiting, fevers, sweats, chills, or worsening of your symptoms         Discharge Follow-up with Specialty: pulmonology 2-4 weeks or as otherwise directed   As directed      Specialty: pulmonology 2-4 weeks or as otherwise directed        Discharge Follow-up with Specified Provider: cardiology, october 19th   As directed      To: cardiology, october 19th            Time Spent on Discharge:  Greater than 30 minutes      Jonnie Daniels MD  [facility]  10/10/23  15:35 EDT

## 2023-10-10 NOTE — NURSING NOTE
IV removed, discharge instructions reviewed with patient. No distress at this time. Son will call when here and staff will transport to car. All belongings accounted for.

## 2023-10-11 ENCOUNTER — TELEPHONE (OUTPATIENT)
Dept: CARDIOLOGY | Facility: CLINIC | Age: 88
End: 2023-10-11
Payer: MEDICARE

## 2023-10-11 ENCOUNTER — TRANSITIONAL CARE MANAGEMENT TELEPHONE ENCOUNTER (OUTPATIENT)
Dept: CALL CENTER | Facility: HOSPITAL | Age: 88
End: 2023-10-11
Payer: MEDICARE

## 2023-10-11 LAB
ALBUMIN SERPL ELPH-MCNC: 3.1 G/DL (ref 2.9–4.4)
ALBUMIN/GLOB SERPL: 1.1 {RATIO} (ref 0.7–1.7)
ALPHA1 GLOB SERPL ELPH-MCNC: 0.3 G/DL (ref 0–0.4)
ALPHA2 GLOB SERPL ELPH-MCNC: 0.8 G/DL (ref 0.4–1)
B-GLOBULIN SERPL ELPH-MCNC: 1 G/DL (ref 0.7–1.3)
GAMMA GLOB SERPL ELPH-MCNC: 0.9 G/DL (ref 0.4–1.8)
GLOBULIN SER-MCNC: 3.1 G/DL (ref 2.2–3.9)
IGA SERPL-MCNC: 214 MG/DL (ref 64–422)
IGG SERPL-MCNC: 735 MG/DL (ref 586–1602)
IGM SERPL-MCNC: 256 MG/DL (ref 26–217)
INTERPRETATION SERPL IEP-IMP: ABNORMAL
LABORATORY COMMENT REPORT: ABNORMAL
M PROTEIN SERPL ELPH-MCNC: 0.2 G/DL
PROT SERPL-MCNC: 6.2 G/DL (ref 6–8.5)

## 2023-10-11 RX ORDER — METOPROLOL TARTRATE 100 MG/1
100 TABLET ORAL 2 TIMES DAILY
Qty: 180 TABLET | Refills: 1 | Status: SHIPPED | OUTPATIENT
Start: 2023-10-11

## 2023-10-11 NOTE — CASE MANAGEMENT/SOCIAL WORK
Case Management Discharge Note      Final Note: DC home, son to transport    Provided Post Acute Provider List?: N/A  Provided Post Acute Provider Quality & Resource List?: N/A    Selected Continued Care - Discharged on 10/10/2023 Admission date: 10/4/2023 - Discharge disposition: Home or Self Care      Destination    No services have been selected for the patient.                Durable Medical Equipment    No services have been selected for the patient.                Dialysis/Infusion    No services have been selected for the patient.                Home Medical Care    No services have been selected for the patient.                Therapy    No services have been selected for the patient.                Community Resources    No services have been selected for the patient.                Community & DME    No services have been selected for the patient.                    Transportation Services  Private: Car    Final Discharge Disposition Code: 01 - home or self-care

## 2023-10-11 NOTE — TELEPHONE ENCOUNTER
Patient daughter  Michelle is calling in regards to her mothers Metoprolol. She states that her mother was discharged from [facility] last night and there was a lot of confusion surrounding her Metoprolol.     Michelle states prior to her mothers admission she was taking 100 MG of Metoprolol in the AM and 50 MG of Metoprolol in the PM.     During her stay in the hospital she was taking 100 MG Of Metoprolol BID once in AM and once in PM.     Patient was released on Metoprolol 100 MG in the AM.     Michelle is asking for clarification on the correct dosage of Metoprolol for her Mother.     Please advise  LEYLA Harry   10/11/2023

## 2023-10-11 NOTE — OUTREACH NOTE
Call Center TCM Note      Flowsheet Row Responses   [facility] patient discharged from? [place]   Does the patient have one of the following disease processes/diagnoses(primary or secondary)? CHF   TCM attempt successful? Yes   Call start time 0838   Call end time 0842   Discharge diagnosis Acute on chronic diastolic congestive heart failure   Meds reviewed with patient/caregiver? Yes   Is the patient having any side effects they believe may be caused by any medication additions or changes? No   Does the patient have all medications ordered at discharge? Yes   Is the patient taking all medications as directed (includes completed medication regime)? Yes   Does the patient have an appointment with their PCP within 7-14 days of discharge? No   Nursing Interventions Assisted patient with making appointment per protocol   Has home health visited the patient within 72 hours of discharge? N/A   Pulse Ox monitoring None   Psychosocial issues? No   Did the patient receive a copy of their discharge instructions? Yes   Nursing interventions Reviewed instructions with patient   What is the patient's perception of their health status since discharge? Improving   Nursing interventions Nurse provided patient education   Is the patient able to teach back signs and symptoms of worsening condition? (i.e. weight gain, shortness of air, etc.) Yes   If the patient is a current smoker, are they able to teach back resources for cessation? Not a smoker   Is the patient/caregiver able to teach back the hierarchy of who to call/visit for symptoms/problems? PCP, Specialist, Home health nurse, Urgent Care, ED, 911 Yes   Is the patient able to teach back Heart Failure Zones? Yes   CHF Zone this Call Green Zone   Green Zone Patient reports doing well, No new or worsening shortness of breath, Physical activity level is normal for you, No new swelling -  feet, ankles and legs look normal for you, Weight check stable, No chest pain   Green  Zone Interventions Daily weight check, Meds as directed, Follow up visits planned, Low sodium diet   TCM call completed? Yes   Call end time 0842   Would this patient benefit from a Referral to [facility] Social Work? No   Is the patient interested in additional calls from an ambulatory ? No             Celine Kaplan LPN    10/11/2023, 08:40 EDT

## 2023-10-11 NOTE — OUTREACH NOTE
Prep Survey      Flowsheet Row Responses   [facility] patient discharged from? [place]   Is LACE score < 7 ? No   Eligibility [facility]   Date of Admission 10/04/23   Date of Discharge 10/10/23   Discharge Disposition Home or Self Care   Discharge diagnosis Acute on chronic diastolic congestive heart failure   Does the patient have one of the following disease processes/diagnoses(primary or secondary)? CHF   Does the patient have Home health ordered? No   Is there a DME ordered? No   Prep survey completed? Yes            FREDO MILLIGAN - Registered Nurse

## 2023-10-13 ENCOUNTER — OFFICE VISIT (OUTPATIENT)
Dept: INTERNAL MEDICINE | Facility: CLINIC | Age: 88
End: 2023-10-13
Payer: MEDICARE

## 2023-10-13 VITALS
TEMPERATURE: 97.4 F | DIASTOLIC BLOOD PRESSURE: 64 MMHG | SYSTOLIC BLOOD PRESSURE: 116 MMHG | BODY MASS INDEX: 21.85 KG/M2 | HEIGHT: 63 IN | OXYGEN SATURATION: 98 % | WEIGHT: 123.3 LBS | HEART RATE: 88 BPM

## 2023-10-13 DIAGNOSIS — I50.32 CHRONIC DIASTOLIC CHF (CONGESTIVE HEART FAILURE): ICD-10-CM

## 2023-10-13 DIAGNOSIS — J47.1 BRONCHIECTASIS WITH ACUTE EXACERBATION: ICD-10-CM

## 2023-10-13 DIAGNOSIS — I10 PRIMARY HYPERTENSION: ICD-10-CM

## 2023-10-13 DIAGNOSIS — F41.9 ANXIETY: ICD-10-CM

## 2023-10-13 DIAGNOSIS — Z00.00 MEDICARE ANNUAL WELLNESS VISIT, SUBSEQUENT: Primary | ICD-10-CM

## 2023-10-13 RX ORDER — CITALOPRAM 20 MG/1
20 TABLET ORAL DAILY
Start: 2023-12-01

## 2023-10-13 NOTE — PROGRESS NOTES
"Chief Complaint  Congestive Heart Failure (Hospital follow up ) and Medicare Wellness-subsequent    Subjective        Agnieszka Rizzo presents to [facility]  Congestive Heart Failure      Patient appointment to discuss recent hospitalization with combination CHF and bronchiectasis exacerbation with pneumonia  Improved with diuretics and antibiotics  She is finishing a tapering dose of prednisone  She feels stable she is still coughing minimal production  Pressure is well controlled she is not gaining any weight she has not had any significant weight gain with previous exacerbations of CHF difficult for her to really determine whether the coughing and shortness of breath are more pulmonary or cardiac in origin  Objective   Vital Signs:  /64   Pulse 88   Temp 97.4 øF (36.3 øC)   Ht 160 cm (62.99\")   Wt 55.9 kg (123 lb 4.8 oz)   SpO2 98%   BMI 21.85 kg/mý   Estimated body mass index is 21.85 kg/mý as calculated from the following:    Height as of this encounter: 160 cm (62.99\").    Weight as of this encounter: 55.9 kg (123 lb 4.8 oz).       BMI is within normal parameters. No other follow-up for BMI required.      Physical Exam  Vitals and nursing note reviewed.   Constitutional:       Appearance: Normal appearance.   HENT:      Head: Normocephalic and atraumatic.   Cardiovascular:      Rate and Rhythm: Normal rate.   Pulmonary:      Effort: Pulmonary effort is normal.      Breath sounds: Rhonchi present.   Musculoskeletal:      Right lower leg: No edema.      Left lower leg: No edema.   Neurological:      Mental Status: She is alert.   Psychiatric:         Mood and Affect: Mood normal.         Behavior: Behavior normal.         Thought Content: Thought content normal.         Judgment: Judgment normal.        Result Review :    Common labs          10/8/2023    05:18 10/9/2023    04:18 10/10/2023    03:59   Common Labs   Glucose 148  155  141    BUN 36  47  46    Creatinine " 1.25  1.21  0.91    Sodium 135  134  137    Potassium 4.2  3.4  4.0    Chloride 90  90  98    Calcium 10.2  10.3  10.0    Total Protein   6.2    Albumin 3.9   3.1    Total Bilirubin 0.5      Alkaline Phosphatase 120      AST (SGOT) 44      ALT (SGPT) 34      WBC 9.36  13.94  11.32    Hemoglobin 11.4  11.4  10.7    Hematocrit 34.7  35.0  32.7    Platelets 307  386  416                   Assessment and Plan   Diagnoses and all orders for this visit:      1. Chronic diastolic CHF (congestive heart failure)    2. Primary hypertension    3. Bronchiectasis with acute exacerbation    4.  Worsening anxiety increased dose of citalopram 20 mg daily  Heart failure monitor blood pressure monitor weight continue torsemide potassium metoprolol  Hypertension treated with diuretic and beta-blocker  Chronic ectasis azithromycin daily tapering dose of steroids       Follow Up   Return in about 1 month (around 11/13/2023), or if symptoms worsen or fail to improve, for Recheck.  Patient was given instructions and counseling regarding her condition or for health maintenance advice. Please see specific information pulled into the AVS if appropriate.

## 2023-10-13 NOTE — PROGRESS NOTES
The ABCs of the Annual Wellness Visit  Subsequent Medicare Wellness Visit    Subjective      Agnieszka Rizzo is a 89 y.o. female who presents for a Subsequent Medicare Wellness Visit.    The following portions of the patient's history were reviewed and   updated as appropriate: allergies, current medications, past family history, past medical history, past social history, past surgical history, and problem list.    Compared to one year ago, the patient feels her physical   health is worse.    Compared to one year ago, the patient feels her mental   health is the same.    Recent Hospitalizations:  This patient has had a [facility] admission record on file within the last 365 days.    Current Medical Providers:  Patient Care Team:  Matt Rose MD as PCP - General (Family Medicine)  Marcie Robbins MD as Consulting Physician (Cardiology)  Nilesh Danielle MD as Consulting Physician (Urology)  Lina Morris RPH as Pharmacist  Lev Mckeon PharmD as Pharmacist (Pharmacy)  Rogelio Tucker MD as Consulting Physician (Pulmonary Disease)    Outpatient Medications Prior to Visit   Medication Sig Dispense Refill    acetaminophen (TYLENOL) 325 MG tablet Take 2 tablets by mouth Every 4 (Four) Hours As Needed for Moderate Pain.      albuterol sulfate  (90 Base) MCG/ACT inhaler Inhale 2 puffs Every 6 (Six) Hours As Needed for Wheezing or Shortness of Air. 8 g 11    apixaban (ELIQUIS) 2.5 MG tablet tablet Take 1 tablet by mouth Every 12 (Twelve) Hours. Indications: Other - full anticoagulation 180 tablet 3    benzonatate (TESSALON) 200 MG capsule Take 1 capsule by mouth 3 (Three) Times a Day As Needed. Indications: Cough      cholecalciferol (VITAMIN D3) 25 MCG (1000 UT) tablet Take 1 tablet by mouth Daily.      citalopram (CeleXA) 10 MG tablet Take 1 tablet by mouth Daily. 90 tablet 2    FeroSul 325 (65 Fe) MG tablet TAKE ONE TABLET BY MOUTH DAILY WITH BREAKFAST (Patient taking differently: Take 1 tablet by  mouth Daily.) 90 tablet 0    fexofenadine (ALLEGRA) 180 MG tablet Take 1 tablet by mouth Daily.      fluticasone (FLONASE) 50 MCG/ACT nasal spray 2 sprays into the nostril(s) as directed by provider At Night As Needed for Allergies.      fluticasone-salmeterol (ADVAIR HFA) 115-21 MCG/ACT inhaler Inhale 2 puffs 2 (Two) Times a Day.      guaiFENesin (MUCINEX) 600 MG 12 hr tablet Take 1 tablet by mouth 2 (Two) Times a Day.      ipratropium-albuterol (DUO-NEB) 0.5-2.5 mg/3 ml nebulizer Take 3 mL by nebulization 2 (Two) Times a Day.      Lactobacillus (FLORAJEN ACIDOPHILUS) capsule Take 1 tablet by mouth Daily.      metoprolol tartrate (LOPRESSOR) 100 MG tablet Take 1 tablet by mouth 2 (Two) Times a Day. 180 tablet 1    Multiple Vitamins-Minerals (PRESERVISION AREDS PO) Take 1 tablet by mouth 2 (Two) Times a Day.      potassium chloride (K-DUR,KLOR-CON) 20 MEQ CR tablet Take 1 tablet by mouth Daily. 90 tablet 3    predniSONE (DELTASONE) 10 MG tablet Take 4 tablets by mouth Daily for 7 days, THEN 3 tablets Daily for 7 days, THEN 2 tablets Daily for 7 days, THEN 1 tablet Daily for 14 days. 77 tablet 0    sodium chloride 0.9 % nebulizer solution Take 3 mL by nebulization 2 (Two) Times a Day.      torsemide (DEMADEX) 20 MG tablet Take 2 tablets by mouth 2 (Two) Times a Day for 360 days. 360 tablet 3    azithromycin (ZITHROMAX) 250 MG tablet Take 1 tablet by mouth Daily. (Patient not taking: Reported on 10/13/2023)       No facility-administered medications prior to visit.       No opioid medication identified on active medication list. I have reviewed chart for other potential  high risk medication/s and harmful drug interactions in the elderly.        Aspirin is not on active medication list.  Aspirin use is contraindicated for this patient due to: current use of Eliquis.  .    Patient Active Problem List   Diagnosis    Primary hypertension    Chronic coronary artery disease    Familial hypercholesterolemia    Mitral valve  "insufficiency    Ventricular premature beats    Ventricular tachycardia    Acute on chronic respiratory failure with hypoxia    Acid-fast bacteria present    DNR (do not resuscitate)    Chronic diastolic CHF (congestive heart failure)    Asymptomatic bacteriuria    Iron deficiency anemia    Hypokalemia    Bronchiectasis    Pneumonia of both lungs due to infectious organism    KINA (mycobacterium avium-intracellulare)    Paroxysmal atrial fibrillation    Anxiety    Exposure to hepatitis A    Medicare annual wellness visit, subsequent    Abdominal pain    Herpes infection    Moderate asthma with acute exacerbation    Bronchitis, acute, with bronchospasm    Abnormal EKG    Fall    Laceration of left upper extremity    Multiple skin tears    Alteration in anticoagulation    Blind right eye    Clinical diagnosis of COVID-19    Pneumonia of right lung due to infectious organism, unspecified part of lung    COPD (chronic obstructive pulmonary disease)    Dizziness    Bronchiectasis    Sepsis due to pneumonia    Acute pulmonary edema    Electrolyte imbalance     Advance Care Planning   Advance Care Planning     Advance Directive is not on file.  ACP discussion was held with the patient during this visit. Patient has an advance directive (not in EMR), copy requested.     Objective    Vitals:    10/13/23 1018   BP: 116/64   Pulse: 88   Temp: 97.4 øF (36.3 øC)   SpO2: 98%   Weight: 55.9 kg (123 lb 4.8 oz)   Height: 160 cm (62.99\")     Estimated body mass index is 21.85 kg/mý as calculated from the following:    Height as of this encounter: 160 cm (62.99\").    Weight as of this encounter: 55.9 kg (123 lb 4.8 oz).    BMI is within normal parameters. No other follow-up for BMI required.      Does the patient have evidence of cognitive impairment?   No            HEALTH RISK ASSESSMENT    Smoking Status:  Social History     Tobacco Use   Smoking Status Never   Smokeless Tobacco Never   Tobacco Comments    caffiene daily     Alcohol " Consumption:  Social History     Substance and Sexual Activity   Alcohol Use Not Currently    Alcohol/week: 2.0 standard drinks of alcohol    Types: 1 Glasses of wine, 1 Shots of liquor per week     Fall Risk Screen:    MICHELLE Fall Risk Assessment has not been completed.    Depression Screenin/14/2023    11:07 AM   PHQ-2/PHQ-9 Depression Screening   Little Interest or Pleasure in Doing Things 1-->several days   Feeling Down, Depressed or Hopeless 3-->nearly every day   Trouble Falling or Staying Asleep, or Sleeping Too Much 3-->nearly every day   Feeling Tired or Having Little Energy 3-->nearly every day   Poor Appetite or Overeating 0-->not at all   Feeling Bad about Yourself - or that You are a Failure or Have Let Yourself or Your Family Down 0-->not at all   Trouble Concentrating on Things, Such as Reading the Newspaper or Watching Television 2-->more than half the days   Moving or Speaking So Slowly that Other People Could Have Noticed? Or the Opposite - Being So Fidgety 0-->not at all   Thoughts that You Would be Better Off Dead or of Hurting Yourself in Some Way 0-->not at all   PHQ-9: Brief Depression Severity Measure Score 12       Health Habits and Functional and Cognitive Screening:      10/13/2023    10:00 AM   Functional & Cognitive Status   Do you have difficulty preparing food and eating? Yes   Do you have difficulty bathing yourself, getting dressed or grooming yourself? No   Do you have difficulty using the toilet? No   Do you have difficulty moving around from place to place? Yes   Do you have trouble with steps or getting out of a bed or a chair? Yes   Current Diet Well Balanced Diet   Dental Exam Up to date   Eye Exam Up to date   Exercise (times per week) 6 times per week   Current Exercises Include Walking   Do you need help using the phone?  No   Are you deaf or do you have serious difficulty hearing?  No   Do you need help to go to places out of walking distance? Yes   Do you need  help shopping? Yes   Do you need help preparing meals?  Yes   Do you need help with housework?  Yes   Do you need help with laundry? Yes   Do you need help taking your medications? Yes   Do you need help managing money? Yes   Do you ever drive or ride in a car without wearing a seat belt? No   Have you felt unusual stress, anger or loneliness in the last month? Yes   Who do you live with? Alone   If you need help, do you have trouble finding someone available to you? No   Have you been bothered in the last four weeks by sexual problems? No   Do you have difficulty concentrating, remembering or making decisions? Yes       Age-appropriate Screening Schedule:  Refer to the list below for future screening recommendations based on patient's age, sex and/or medical conditions. Orders for these recommended tests are listed in the plan section. The patient has been provided with a written plan.    Health Maintenance   Topic Date Due    DXA SCAN  Never done    LIPID PANEL  08/27/2022    COVID-19 Vaccine (4 - 2023-24 season) 09/01/2023    ANNUAL WELLNESS VISIT  09/09/2023    TDAP/TD VACCINES (2 - Td or Tdap) 07/12/2031    Pneumococcal Vaccine 65+  Completed    ZOSTER VACCINE  Completed    INFLUENZA VACCINE  Discontinued                  CMS Preventative Services Quick Reference  Risk Factors Identified During Encounter:    Depression/Dysphoria: Current medication adjusted.  Follow up visit planned.  Immunizations Discussed/Encouraged: Prevnar 20 (Pneumococcal 20-valent conjugate) and RSV    The above risks/problems have been discussed with the patient.  Pertinent information has been shared with the patient in the After Visit Summary.    Diagnoses and all orders for this visit:    1. Medicare annual wellness visit, subsequent (Primary)      Recommend pneumonia and RSV vaccine in approximately 1 1/2  to 2 months  Follow Up:   Next Medicare Wellness visit to be scheduled in 1 year.      An After Visit Summary and PPPS were made  available to the patient.

## 2023-10-13 NOTE — PROGRESS NOTES
Enter Query Response Below      Query Response: Heart failure due to Atrial Fibrillation       Electronically signed by Marcie Robbins MD, 10/13/23, 1:35 PM EDT.               If applicable, please update the problem list.   Patient: Agnieszka Rizzo        : 1930  Account: [id number]           Admit Date: 10/4/2023        How to Respond to this query:       a. Click New Note     b. Answer query within the yellow box.                c. Update the Problem List, if applicable.      If you have any questions about this query contact me at: [email or web address]    Dr. Robbins,     89 yr female noted with acute on chronic diastolic heart failure. IV Lasix given. Patient also noted with history of HTN, Atrial Fibrillation and Mitral regurgitation treated with Eliquis, Lopressor and IV Lanoxin.     Please clarify the etiology of the patient's heart failure:    Heart failure due to hypertension  Heart failure due to Atrial Fibrillation  Heart failure due to valvular disease  Heart failure due to hypertension, Atrial Fibrillation and valvular disease  Other- specify__________  Unable to determine      By submitting this query, we are merely seeking further clarification of documentation to accurately reflect all conditions that you are monitoring, evaluating, treating or that extend the hospitalization or utilize additional resources of care. Please utilize your independent clinical judgment when addressing the question(s) above.     This query and your response, once completed, will be entered into the legal medical record.    Sincerely,  Joan MAS Rn  Clinical Documentation Integrity Program

## 2023-10-19 ENCOUNTER — OFFICE VISIT (OUTPATIENT)
Dept: CARDIOLOGY | Facility: CLINIC | Age: 88
End: 2023-10-19
Payer: MEDICARE

## 2023-10-19 VITALS
SYSTOLIC BLOOD PRESSURE: 120 MMHG | DIASTOLIC BLOOD PRESSURE: 76 MMHG | HEIGHT: 62 IN | HEART RATE: 68 BPM | WEIGHT: 126 LBS | RESPIRATION RATE: 16 BRPM | BODY MASS INDEX: 23.19 KG/M2 | OXYGEN SATURATION: 99 %

## 2023-10-19 DIAGNOSIS — I48.21 PERMANENT ATRIAL FIBRILLATION: ICD-10-CM

## 2023-10-19 DIAGNOSIS — I25.10 CHRONIC CORONARY ARTERY DISEASE: Primary | ICD-10-CM

## 2023-10-19 DIAGNOSIS — I34.0 NONRHEUMATIC MITRAL VALVE REGURGITATION: ICD-10-CM

## 2023-10-19 DIAGNOSIS — I10 PRIMARY HYPERTENSION: ICD-10-CM

## 2023-10-19 DIAGNOSIS — I50.32 CHRONIC DIASTOLIC CHF (CONGESTIVE HEART FAILURE): ICD-10-CM

## 2023-10-19 NOTE — PROGRESS NOTES
CARDIOLOGY    Marcie Robbins MD    ENCOUNTER DATE:  10/19/2023    Agnieszka Rizzo / 89 y.o. / female        CHIEF COMPLAINT / REASON FOR OFFICE VISIT     Heart Problem (6 Month follow up )      HISTORY OF PRESENT ILLNESS       HPI    Agnieszka Rizzo is a 89 y.o. female     This is a lady I met while she was hospitalized in November 2016. She has a significant pulmonary history and was having a bronchoscopy and unfortunately developed a pneumothorax. She was found to be in rate -controlled atrial fibrillation. She had previously been seen by Dr. Ana Goodrich for history of hypertension, hyperlipidemia, mild mitral valve prolapse and nonobstructive coronary artery disease diagnosed by heart catheterization in 2007.      Echocardiogram November 15, 2016:  All left ventricular wall segments contract normally.  Left ventricular function is normal. Estimated EF = 58%.  Left atrial cavity size is moderately dilated.  Mild tricuspid valve regurgitation is present.  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 They felt she would do well on oral anticoagulation and I started her on Pradaxa this was subsequently changed to warfarin. I did not do a stress test while she was hospitalized because she was wheezing and I did not fill comfortable giving her Lexiscan at that time and I did not when he is dobutamine because of her atrial fibrillation.       She was able to have a stress as an outpt on 12/19/16 and this was normal. She continued to complain of dyspnea on exertion and so I had her set up for a cardioversion. She had a couple of hospitalizations in the meantime for respiratory issues. While she was in the hospital in January 2017, I attempted to cardiovert her. I shocked her 3 times that she did not remain in sinus rhythm. I spoke with her family and we decided to continue with rate  control and anticoagulation.      She was hospitalized in 02/2017. She had started Voriconazole for treatment of pulmonary aspergillosis by Dr. Tucker. She became very nauseous and sick, and threw up for an entire day. She came into the hospital confused and weak. Her sodium was at 109. She was found to have a left clavicle fracture from a fall. She was discharged to [facility], where she has been. Unfortunately, as a result of the treatment for the aspergillosis, she developed C. difficile and was rehospitalized for treatment of this in March 2017. As a part of his treatment she did receive IV fluids. She was discharged but then I readmitted her on March 22 for IV diuresis. She was volume overloaded and in diastolic heart failure. She was back in the hospital on April 2 with more C. difficile colitis. Again she was treated with IV fluids and became volume overloaded. I diuresed her in the hospital and she was only mildly volume overloaded at discharge.     She was hospitalized from May 31 of June 9 2017.  While there she had some rapid ventricular response due to her atrial fibrillation and being sick with community-acquired pneumonia.  Some changes were made to her medication but in the end she was discharged on verapamil 360 mg once a day and digoxin 0.125 mg a day.     She was hospitalized in December 2019 with a COPD exacerbation/viral bronchitis.       I saw her in January 2023 and she was complaining of palpitations.  She wore a Zio patch which showed atrial fibrillation with a heart rate range of 51 to 168 bpm with an average of 90 bpm.  She saw PJ Alvarado in February 2023 with palpitations and tachycardia and her metoprolol was increased.     She was hospitalized in April 2023 with sepsis/pneumonia/COPD exacerbation.  I saw her while she was in the hospital.  And did not make any changes to her medications.     She was hospitalized in October 2023 with pneumonia/respiratory failure/chronic lung  "disease.  I saw her while she was in the hospital.  She did require some IV diuretic.  Her BNP was elevated over 8000.  Echocardiogram October 6, 2023 showed an ejection fraction of 56% with severe biatrial enlargement.  There was mild to moderate mitral regurgitation, moderate tricuspid regurgitation with right ventricular systolic pressure 49 mmHg and a small pericardial effusion without tamponade.    She comes in today for routine follow-up/post hospital follow-up.  She has been having a cough.  She is less short of breath.  She is not having orthopnea or edema.  Her heart failure symptoms tend to be orthopnea.    REVIEW OF SYSTEMS     Review of Systems   Constitutional: Negative for chills, fever, weight gain and weight loss.   Cardiovascular:  Negative for leg swelling.   Respiratory:  Positive for cough and wheezing. Negative for snoring.    Hematologic/Lymphatic: Negative for bleeding problem. Bruises/bleeds easily.   Skin:  Negative for color change.   Musculoskeletal:  Negative for falls, joint pain and myalgias.   Gastrointestinal:  Negative for melena.   Genitourinary:  Negative for hematuria.   Neurological:  Negative for excessive daytime sleepiness.   Psychiatric/Behavioral:  Negative for depression. The patient is not nervous/anxious.          VITAL SIGNS     Visit Vitals  /76 (BP Location: Left arm, Patient Position: Sitting, Cuff Size: Adult)   Pulse 68   Resp 16   Ht 157.5 cm (62\")   Wt 57.2 kg (126 lb)   SpO2 99%   BMI 23.05 kg/m²         Wt Readings from Last 3 Encounters:   10/19/23 57.2 kg (126 lb)   10/13/23 55.9 kg (123 lb 4.8 oz)   10/10/23 56.1 kg (123 lb 9.6 oz)     Body mass index is 23.05 kg/m².      PHYSICAL EXAMINATION     Constitutional:       General: Not in acute distress.  Neck:      Vascular: No carotid bruit or JVD.   Pulmonary:      Effort: Pulmonary effort is normal.      Breath sounds: Normal breath sounds.   Cardiovascular:      Normal rate. Regularly irregular rhythm. "      Murmurs: There is no murmur.   Psychiatric:         Mood and Affect: Mood and affect normal.           REVIEWED DATA       ECG 12 Lead    Date/Time: 10/19/2023 10:57 AM  Performed by: Marcie Robbins MD    Authorized by: Marcie Robbins MD  Comparison: compared with previous ECG from 8/4/2023  Comparison to previous ECG: Heart rate is slower  Rhythm: atrial fibrillation  Ectopy: infrequent PVCs  BPM: 68  Other findings: non-specific ST-T wave changes    Clinical impression: abnormal EKG                Lab Results   Component Value Date    GLUCOSE 141 (H) 10/10/2023    BUN 46 (H) 10/10/2023    CREATININE 0.91 10/10/2023    EGFRRESULT 67 04/14/2023    EGFR 58.9 (L) 10/10/2023    BCR 50.5 (H) 10/10/2023    K 4.0 10/10/2023    CO2 27.4 10/10/2023    CALCIUM 10.0 (H) 10/10/2023    PROTENTOTREF 6.2 10/10/2023    ALBUMIN 3.1 10/10/2023    BILITOT 0.5 10/08/2023    AST 44 (H) 10/08/2023    ALT 34 (H) 10/08/2023       ASSESSMENT & PLAN      Diagnosis Plan   1. Chronic coronary artery disease        2. Chronic diastolic CHF (congestive heart failure)        3. Nonrheumatic mitral valve regurgitation        4. Primary hypertension        5. Permanent atrial fibrillation            1. Atrial fibrillation. She failed cardioversion in January 2017. She has a CHADS2-Vasc score of 5.  She was on warfarin but her INR was hard to control.  She is doing well on Eliquis 2.5 mg twice a day as well as metoprolol.     2. Chronic diastolic heart failure. She is on torsemide.  She is euvolemic.  She did have an acute exacerbation in October with a BNP of over 8000.  She looks euvolemic on exam today.  I told her to call me if she has symptoms that she thinks is due to fluid overload and I will bring her into [facility] for labs and IV diuretic.     3. Dyspnea on exertion. Multifactorial.  Improving.  She is exercising regularly.     4. Mitral valve prolapse with very mild mitral regurgitation.     5. Mild tricuspid regurgitation without  pulmonary hypertension.     6. Nonobstructive coronary artery disease diagnosed by catheter in 2007. She has noted coronary artery calcification on CT scans. Nuclear stress 12/16 was normal.  Nuclear stress test in 2018 was also normal.     7. Hypertension.  She is only on metoprolol and torsemide.  She is no longer on an angiotensin receptor blocker.     8. Hyperlipidemia.  She is no longer on statin therapy given her age.     9. Hyponatremia. Stable.     10. C. difficile diarrhea. This has resolved and she no longer needs a fecal transplant     11. Bronchiectasis with MAC and aspergillus.  She follows with Dr. Tucker     Follow-up with me in 6 months.      Orders Placed This Encounter   Procedures    ECG 12 Lead     This order was created via procedure documentation     Order Specific Question:   Release to patient     Answer:   Routine Release [[id number]]           MEDICATIONS         Discharge Medications            Accurate as of October 19, 2023 11:00 AM. If you have any questions, ask your nurse or doctor.                Changes to Medications        Instructions Start Date   FeroSul 325 (65 Fe) MG tablet  Generic drug: ferrous sulfate  What changed:   how much to take  when to take this   TAKE ONE TABLET BY MOUTH DAILY WITH BREAKFAST             Continue These Medications        Instructions Start Date   acetaminophen 325 MG tablet  Commonly known as: TYLENOL   650 mg, Oral, Every 4 Hours PRN      albuterol sulfate  (90 Base) MCG/ACT inhaler  Commonly known as: PROVENTIL HFA;VENTOLIN HFA;PROAIR HFA   2 puffs, Inhalation, Every 6 Hours PRN      apixaban 2.5 MG tablet tablet  Commonly known as: ELIQUIS   2.5 mg, Oral, Every 12 Hours Scheduled      azithromycin 250 MG tablet  Commonly known as: ZITHROMAX   250 mg, Oral, Daily      benzonatate 200 MG capsule  Commonly known as: TESSALON   1 capsule, Oral, 3 Times Daily PRN      cholecalciferol 25 MCG (1000 UT) tablet  Commonly known as: VITAMIN D3   1,000  Units, Oral, Daily      citalopram 20 MG tablet  Commonly known as: CeleXA   20 mg, Oral, Daily   Start Date: December 1, 2023     fexofenadine 180 MG tablet  Commonly known as: ALLEGRA   180 mg, Oral, Daily      Florajen Acidophilus capsule   1 tablet, Oral, Daily      fluticasone 50 MCG/ACT nasal spray  Commonly known as: FLONASE   2 sprays, Nasal, Nightly PRN      fluticasone-salmeterol 115-21 MCG/ACT inhaler  Commonly known as: ADVAIR HFA   2 puffs, Inhalation, 2 Times Daily - RT      guaiFENesin 600 MG 12 hr tablet  Commonly known as: MUCINEX   600 mg, Oral, 2 Times Daily      ipratropium-albuterol 0.5-2.5 mg/3 ml nebulizer  Commonly known as: DUO-NEB   3 mL, Nebulization, 2 Times Daily      metoprolol tartrate 100 MG tablet  Commonly known as: LOPRESSOR   100 mg, Oral, 2 Times Daily      multivitamin with minerals tablet tablet   1 tablet, Oral, 2 Times Daily      potassium chloride 20 MEQ CR tablet  Commonly known as: K-DUR,KLOR-CON   20 mEq, Oral, Daily      predniSONE 10 MG tablet  Commonly known as: DELTASONE   Take 4 tablets by mouth Daily for 7 days, THEN 3 tablets Daily for 7 days, THEN 2 tablets Daily for 7 days, THEN 1 tablet Daily for 14 days.   Start Date: October 10, 2023     sodium chloride 0.9 % nebulizer solution   3 mL, Nebulization, 2 Times Daily      torsemide 20 MG tablet  Commonly known as: DEMADEX   40 mg, Oral, 2 Times Daily                 Marcie Robbins MD  10/19/23  11:00 EDT    Part of this note may be an electronic transcription/translation of spoken language to printed text using the Dragon dictation system.

## 2023-10-20 ENCOUNTER — TRANSCRIBE ORDERS (OUTPATIENT)
Dept: ADMINISTRATIVE | Facility: HOSPITAL | Age: 88
End: 2023-10-20
Payer: MEDICARE

## 2023-10-20 ENCOUNTER — READMISSION MANAGEMENT (OUTPATIENT)
Dept: CALL CENTER | Facility: HOSPITAL | Age: 88
End: 2023-10-20
Payer: MEDICARE

## 2023-10-20 DIAGNOSIS — J18.9 PNEUMONIA DUE TO INFECTIOUS ORGANISM, UNSPECIFIED LATERALITY, UNSPECIFIED PART OF LUNG: Primary | ICD-10-CM

## 2023-10-20 NOTE — OUTREACH NOTE
CHF Week 2 Survey      Flowsheet Row Responses   [facility] patient discharged from? [place]   Does the patient have one of the following disease processes/diagnoses(primary or secondary)? CHF   Week 2 attempt successful? Yes   Call start time 1045   Call end time 1051   Discharge diagnosis Acute on chronic diastolic congestive heart failure   Medication alerts for this patient pt takes a dose of zithromax daily and has for a long time. Dtr organizes her medications.   Meds reviewed with patient/caregiver? Yes   Is the patient taking all medications as directed (includes completed medication regime)? Yes   Has the patient kept scheduled appointments due by today? Yes   DME comments 2L O2 night/sleep. Uses walker for stability   Pulse Ox monitoring Intermittent   Pulse Ox device source Patient   O2 Sat comments above 90%   O2 Sat: education provided Sat levels, Monitoring frequency, When to seek care   Psychosocial issues? No   Comments Pt c/o constant dry cough, MD aware. Pt reports that she is at baseline and monitors her daily wts, tolerating po intake. Pt now has a caregiver in home, she reports.   What is the patient's perception of their health status since discharge? Returned to baseline/stable   Nursing interventions Nurse provided patient education   Is the patient/caregiver able to teach back the hierarchy of who to call/visit for symptoms/problems? PCP, Specialist, Home health nurse, Urgent Care, ED, 911 Yes   CHF Nursing Interventions Education provided on various zones   CHF Zone this Call Green Zone   Green Zone Patient reports doing well, No new or worsening shortness of breath, Physical activity level is normal for you, Weight check stable, No new swelling -  feet, ankles and legs look normal for you   Green Zone Interventions Daily weight check, Meds as directed, Low sodium diet, Follow up visits planned   CHF Week 2 call completed? Yes   Revoked No further contact(revokes)-requires comment    Is the patient interested in additional calls from an ambulatory ? No   Wrap up additional comments .   Call end time 1051            Alise RAYMUNDO - Registered Nurse

## 2023-10-30 LAB — FUNGUS WND CULT: ABNORMAL

## 2023-11-13 LAB
MYCOBACTERIUM SPEC CULT: NORMAL
NIGHT BLUE STAIN TISS: NORMAL

## 2023-11-26 ENCOUNTER — HOSPITAL ENCOUNTER (INPATIENT)
Facility: HOSPITAL | Age: 88
LOS: 5 days | Discharge: HOME | DRG: 291 | End: 2023-12-02
Attending: EMERGENCY MEDICINE | Admitting: INTERNAL MEDICINE
Payer: MEDICARE

## 2023-11-26 ENCOUNTER — APPOINTMENT (OUTPATIENT)
Dept: GENERAL RADIOLOGY | Facility: HOSPITAL | Age: 88
DRG: 291 | End: 2023-11-26
Payer: MEDICARE

## 2023-11-26 DIAGNOSIS — Z87.09 HISTORY OF BRONCHIECTASIS: ICD-10-CM

## 2023-11-26 DIAGNOSIS — I50.9 ACUTE ON CHRONIC CONGESTIVE HEART FAILURE, UNSPECIFIED HEART FAILURE TYPE: Primary | ICD-10-CM

## 2023-11-26 DIAGNOSIS — R73.9 HYPERGLYCEMIA: ICD-10-CM

## 2023-11-26 DIAGNOSIS — I50.33 ACUTE ON CHRONIC DIASTOLIC CONGESTIVE HEART FAILURE: ICD-10-CM

## 2023-11-26 DIAGNOSIS — D72.829 LEUKOCYTOSIS, UNSPECIFIED TYPE: ICD-10-CM

## 2023-11-26 DIAGNOSIS — R79.89 ELEVATED TROPONIN: ICD-10-CM

## 2023-11-26 DIAGNOSIS — R53.1 GENERALIZED WEAKNESS: ICD-10-CM

## 2023-11-26 LAB
ALBUMIN SERPL-MCNC: 3.9 G/DL (ref 3.5–5.2)
ALBUMIN/GLOB SERPL: 1.4 G/DL
ALP SERPL-CCNC: 91 U/L (ref 39–117)
ALT SERPL W P-5'-P-CCNC: 12 U/L (ref 1–33)
ANION GAP SERPL CALCULATED.3IONS-SCNC: 14 MMOL/L (ref 5–15)
AST SERPL-CCNC: 22 U/L (ref 1–32)
BASOPHILS # BLD AUTO: 0.05 10*3/MM3 (ref 0–0.2)
BASOPHILS NFR BLD AUTO: 0.4 % (ref 0–1.5)
BILIRUB SERPL-MCNC: 0.3 MG/DL (ref 0–1.2)
BUN SERPL-MCNC: 14 MG/DL (ref 8–23)
BUN/CREAT SERPL: 14.1 (ref 7–25)
CALCIUM SPEC-SCNC: 9.4 MG/DL (ref 8.2–9.6)
CHLORIDE SERPL-SCNC: 101 MMOL/L (ref 98–107)
CO2 SERPL-SCNC: 27 MMOL/L (ref 22–29)
CREAT SERPL-MCNC: 0.99 MG/DL (ref 0.57–1)
DEPRECATED RDW RBC AUTO: 52.7 FL (ref 37–54)
EGFRCR SERPLBLD CKD-EPI 2021: 53.3 ML/MIN/1.73
EOSINOPHIL # BLD AUTO: 0.09 10*3/MM3 (ref 0–0.4)
EOSINOPHIL NFR BLD AUTO: 0.7 % (ref 0.3–6.2)
ERYTHROCYTE [DISTWIDTH] IN BLOOD BY AUTOMATED COUNT: 14.5 % (ref 12.3–15.4)
FLUAV SUBTYP SPEC NAA+PROBE: NOT DETECTED
FLUBV RNA ISLT QL NAA+PROBE: NOT DETECTED
GLOBULIN UR ELPH-MCNC: 2.7 GM/DL
GLUCOSE SERPL-MCNC: 116 MG/DL (ref 65–99)
HCT VFR BLD AUTO: 33.6 % (ref 34–46.6)
HGB BLD-MCNC: 11.2 G/DL (ref 12–15.9)
HOLD SPECIMEN: NORMAL
HOLD SPECIMEN: NORMAL
IMM GRANULOCYTES # BLD AUTO: 0.08 10*3/MM3 (ref 0–0.05)
IMM GRANULOCYTES NFR BLD AUTO: 0.6 % (ref 0–0.5)
LYMPHOCYTES # BLD AUTO: 2.42 10*3/MM3 (ref 0.7–3.1)
LYMPHOCYTES NFR BLD AUTO: 18.2 % (ref 19.6–45.3)
MAGNESIUM SERPL-MCNC: 2.2 MG/DL (ref 1.7–2.3)
MCH RBC QN AUTO: 33.1 PG (ref 26.6–33)
MCHC RBC AUTO-ENTMCNC: 33.3 G/DL (ref 31.5–35.7)
MCV RBC AUTO: 99.4 FL (ref 79–97)
MONOCYTES # BLD AUTO: 1 10*3/MM3 (ref 0.1–0.9)
MONOCYTES NFR BLD AUTO: 7.5 % (ref 5–12)
NEUTROPHILS NFR BLD AUTO: 72.6 % (ref 42.7–76)
NEUTROPHILS NFR BLD AUTO: 9.65 10*3/MM3 (ref 1.7–7)
NRBC BLD AUTO-RTO: 0 /100 WBC (ref 0–0.2)
NT-PROBNP SERPL-MCNC: 3145 PG/ML (ref 0–1800)
PLATELET # BLD AUTO: 281 10*3/MM3 (ref 140–450)
PMV BLD AUTO: 9.4 FL (ref 6–12)
POTASSIUM SERPL-SCNC: 3 MMOL/L (ref 3.5–5.2)
PROCALCITONIN SERPL-MCNC: 0.05 NG/ML (ref 0–0.25)
PROT SERPL-MCNC: 6.6 G/DL (ref 6–8.5)
RBC # BLD AUTO: 3.38 10*6/MM3 (ref 3.77–5.28)
SARS-COV-2 RNA RESP QL NAA+PROBE: NOT DETECTED
SODIUM SERPL-SCNC: 142 MMOL/L (ref 136–145)
TROPONIN T SERPL HS-MCNC: 25 NG/L
WBC NRBC COR # BLD AUTO: 13.29 10*3/MM3 (ref 3.4–10.8)
WHOLE BLOOD HOLD COAG: NORMAL
WHOLE BLOOD HOLD SPECIMEN: NORMAL

## 2023-11-26 PROCEDURE — 84145 PROCALCITONIN (PCT): CPT | Performed by: EMERGENCY MEDICINE

## 2023-11-26 PROCEDURE — 71045 X-RAY EXAM CHEST 1 VIEW: CPT

## 2023-11-26 PROCEDURE — 85025 COMPLETE CBC W/AUTO DIFF WBC: CPT

## 2023-11-26 PROCEDURE — 93010 ELECTROCARDIOGRAM REPORT: CPT | Performed by: INTERNAL MEDICINE

## 2023-11-26 PROCEDURE — 80053 COMPREHEN METABOLIC PANEL: CPT | Performed by: EMERGENCY MEDICINE

## 2023-11-26 PROCEDURE — 83735 ASSAY OF MAGNESIUM: CPT | Performed by: EMERGENCY MEDICINE

## 2023-11-26 PROCEDURE — 93005 ELECTROCARDIOGRAM TRACING: CPT | Performed by: EMERGENCY MEDICINE

## 2023-11-26 PROCEDURE — 93005 ELECTROCARDIOGRAM TRACING: CPT

## 2023-11-26 PROCEDURE — G0378 HOSPITAL OBSERVATION PER HR: HCPCS

## 2023-11-26 PROCEDURE — 84484 ASSAY OF TROPONIN QUANT: CPT | Performed by: EMERGENCY MEDICINE

## 2023-11-26 PROCEDURE — 99285 EMERGENCY DEPT VISIT HI MDM: CPT

## 2023-11-26 PROCEDURE — 25010000002 POTASSIUM CHLORIDE 10 MEQ/100ML SOLUTION: Performed by: EMERGENCY MEDICINE

## 2023-11-26 PROCEDURE — 25010000002 FUROSEMIDE PER 20 MG: Performed by: EMERGENCY MEDICINE

## 2023-11-26 PROCEDURE — 36415 COLL VENOUS BLD VENIPUNCTURE: CPT

## 2023-11-26 PROCEDURE — 87636 SARSCOV2 & INF A&B AMP PRB: CPT | Performed by: EMERGENCY MEDICINE

## 2023-11-26 PROCEDURE — 83880 ASSAY OF NATRIURETIC PEPTIDE: CPT | Performed by: EMERGENCY MEDICINE

## 2023-11-26 RX ORDER — SODIUM CHLORIDE 9 MG/ML
40 INJECTION, SOLUTION INTRAVENOUS AS NEEDED
Status: DISCONTINUED | OUTPATIENT
Start: 2023-11-26 | End: 2023-12-02 | Stop reason: HOSPADM

## 2023-11-26 RX ORDER — BENZONATATE 100 MG/1
200 CAPSULE ORAL 3 TIMES DAILY PRN
Status: DISCONTINUED | OUTPATIENT
Start: 2023-11-26 | End: 2023-12-02 | Stop reason: HOSPADM

## 2023-11-26 RX ORDER — ALBUTEROL SULFATE 2.5 MG/3ML
2.5 SOLUTION RESPIRATORY (INHALATION) EVERY 6 HOURS PRN
Status: DISCONTINUED | OUTPATIENT
Start: 2023-11-26 | End: 2023-12-02 | Stop reason: HOSPADM

## 2023-11-26 RX ORDER — POTASSIUM CHLORIDE 750 MG/1
20 TABLET, FILM COATED, EXTENDED RELEASE ORAL DAILY
Status: DISCONTINUED | OUTPATIENT
Start: 2023-11-27 | End: 2023-11-28

## 2023-11-26 RX ORDER — POTASSIUM CHLORIDE 7.45 MG/ML
10 INJECTION INTRAVENOUS ONCE
Status: DISCONTINUED | OUTPATIENT
Start: 2023-11-26 | End: 2023-11-26 | Stop reason: DRUGHIGH

## 2023-11-26 RX ORDER — SODIUM CHLORIDE 0.9 % (FLUSH) 0.9 %
10 SYRINGE (ML) INJECTION EVERY 12 HOURS SCHEDULED
Status: DISCONTINUED | OUTPATIENT
Start: 2023-11-26 | End: 2023-12-02 | Stop reason: HOSPADM

## 2023-11-26 RX ORDER — TORSEMIDE 20 MG/1
40 TABLET ORAL 2 TIMES DAILY
Status: DISCONTINUED | OUTPATIENT
Start: 2023-11-26 | End: 2023-11-27

## 2023-11-26 RX ORDER — METOPROLOL TARTRATE 50 MG/1
100 TABLET, FILM COATED ORAL 2 TIMES DAILY
Status: DISCONTINUED | OUTPATIENT
Start: 2023-11-26 | End: 2023-11-28

## 2023-11-26 RX ORDER — BUDESONIDE AND FORMOTEROL FUMARATE DIHYDRATE 160; 4.5 UG/1; UG/1
2 AEROSOL RESPIRATORY (INHALATION)
Status: DISCONTINUED | OUTPATIENT
Start: 2023-11-26 | End: 2023-12-02 | Stop reason: HOSPADM

## 2023-11-26 RX ORDER — POLYETHYLENE GLYCOL 3350 17 G/17G
17 POWDER, FOR SOLUTION ORAL DAILY PRN
Status: DISCONTINUED | OUTPATIENT
Start: 2023-11-26 | End: 2023-12-02 | Stop reason: HOSPADM

## 2023-11-26 RX ORDER — SODIUM CHLORIDE 0.9 % (FLUSH) 0.9 %
10 SYRINGE (ML) INJECTION AS NEEDED
Status: DISCONTINUED | OUTPATIENT
Start: 2023-11-26 | End: 2023-12-02 | Stop reason: HOSPADM

## 2023-11-26 RX ORDER — ACETAMINOPHEN 325 MG/1
650 TABLET ORAL EVERY 4 HOURS PRN
Status: DISCONTINUED | OUTPATIENT
Start: 2023-11-26 | End: 2023-12-02 | Stop reason: HOSPADM

## 2023-11-26 RX ORDER — CITALOPRAM 20 MG/1
20 TABLET ORAL DAILY
Status: DISCONTINUED | OUTPATIENT
Start: 2023-12-01 | End: 2023-12-02 | Stop reason: HOSPADM

## 2023-11-26 RX ORDER — FUROSEMIDE 10 MG/ML
80 INJECTION INTRAMUSCULAR; INTRAVENOUS ONCE
Status: COMPLETED | OUTPATIENT
Start: 2023-11-26 | End: 2023-11-26

## 2023-11-26 RX ORDER — IPRATROPIUM BROMIDE AND ALBUTEROL SULFATE 2.5; .5 MG/3ML; MG/3ML
3 SOLUTION RESPIRATORY (INHALATION) 2 TIMES DAILY
Status: DISCONTINUED | OUTPATIENT
Start: 2023-11-26 | End: 2023-12-01

## 2023-11-26 RX ORDER — ONDANSETRON 4 MG/1
4 TABLET, FILM COATED ORAL EVERY 6 HOURS PRN
Status: DISCONTINUED | OUTPATIENT
Start: 2023-11-26 | End: 2023-12-02 | Stop reason: HOSPADM

## 2023-11-26 RX ORDER — POTASSIUM CHLORIDE 7.45 MG/ML
10 INJECTION INTRAVENOUS ONCE
Status: COMPLETED | OUTPATIENT
Start: 2023-11-26 | End: 2023-11-26

## 2023-11-26 RX ORDER — AZITHROMYCIN 250 MG/1
250 TABLET, FILM COATED ORAL DAILY
Status: COMPLETED | OUTPATIENT
Start: 2023-11-27 | End: 2023-11-30

## 2023-11-26 RX ORDER — ONDANSETRON 2 MG/ML
4 INJECTION INTRAMUSCULAR; INTRAVENOUS EVERY 6 HOURS PRN
Status: DISCONTINUED | OUTPATIENT
Start: 2023-11-26 | End: 2023-12-02 | Stop reason: HOSPADM

## 2023-11-26 RX ORDER — BISACODYL 10 MG
10 SUPPOSITORY, RECTAL RECTAL DAILY PRN
Status: DISCONTINUED | OUTPATIENT
Start: 2023-11-26 | End: 2023-12-02 | Stop reason: HOSPADM

## 2023-11-26 RX ORDER — NITROGLYCERIN 0.4 MG/1
0.4 TABLET SUBLINGUAL
Status: DISCONTINUED | OUTPATIENT
Start: 2023-11-26 | End: 2023-12-02 | Stop reason: HOSPADM

## 2023-11-26 RX ORDER — FLUTICASONE PROPIONATE 50 MCG
2 SPRAY, SUSPENSION (ML) NASAL NIGHTLY PRN
Status: DISCONTINUED | OUTPATIENT
Start: 2023-11-26 | End: 2023-12-02 | Stop reason: HOSPADM

## 2023-11-26 RX ORDER — POTASSIUM CHLORIDE 7.45 MG/ML
10 INJECTION INTRAVENOUS ONCE
Status: DISCONTINUED | OUTPATIENT
Start: 2023-11-27 | End: 2023-11-26 | Stop reason: DRUGHIGH

## 2023-11-26 RX ORDER — BISACODYL 5 MG/1
5 TABLET, DELAYED RELEASE ORAL DAILY PRN
Status: DISCONTINUED | OUTPATIENT
Start: 2023-11-26 | End: 2023-12-02 | Stop reason: HOSPADM

## 2023-11-26 RX ORDER — AMOXICILLIN 250 MG
2 CAPSULE ORAL 2 TIMES DAILY
Status: DISCONTINUED | OUTPATIENT
Start: 2023-11-26 | End: 2023-12-02 | Stop reason: HOSPADM

## 2023-11-26 RX ORDER — GUAIFENESIN 600 MG/1
600 TABLET, EXTENDED RELEASE ORAL 2 TIMES DAILY
Status: DISCONTINUED | OUTPATIENT
Start: 2023-11-26 | End: 2023-12-02 | Stop reason: HOSPADM

## 2023-11-26 RX ADMIN — FUROSEMIDE 80 MG: 10 INJECTION, SOLUTION INTRAMUSCULAR; INTRAVENOUS at 20:32

## 2023-11-26 RX ADMIN — POTASSIUM CHLORIDE 10 MEQ: 7.46 INJECTION, SOLUTION INTRAVENOUS at 23:02

## 2023-11-26 RX ADMIN — APIXABAN 2.5 MG: 2.5 TABLET, FILM COATED ORAL at 22:58

## 2023-11-26 RX ADMIN — GUAIFENESIN 600 MG: 600 TABLET, EXTENDED RELEASE ORAL at 22:58

## 2023-11-26 RX ADMIN — POTASSIUM CHLORIDE 10 MEQ: 7.46 INJECTION, SOLUTION INTRAVENOUS at 20:38

## 2023-11-26 RX ADMIN — Medication 10 ML: at 23:12

## 2023-11-27 PROBLEM — I50.9 CONGESTIVE HEART FAILURE: Status: ACTIVE | Noted: 2023-11-27

## 2023-11-27 PROBLEM — J96.11 CHRONIC RESPIRATORY FAILURE WITH HYPOXIA: Status: ACTIVE | Noted: 2017-01-09

## 2023-11-27 PROBLEM — I50.33 ACUTE ON CHRONIC DIASTOLIC CONGESTIVE HEART FAILURE: Status: ACTIVE | Noted: 2023-11-27

## 2023-11-27 LAB
ANION GAP SERPL CALCULATED.3IONS-SCNC: 14.3 MMOL/L (ref 5–15)
BUN SERPL-MCNC: 12 MG/DL (ref 8–23)
BUN/CREAT SERPL: 13 (ref 7–25)
CALCIUM SPEC-SCNC: 9.2 MG/DL (ref 8.2–9.6)
CHLORIDE SERPL-SCNC: 102 MMOL/L (ref 98–107)
CO2 SERPL-SCNC: 24.7 MMOL/L (ref 22–29)
CREAT SERPL-MCNC: 0.92 MG/DL (ref 0.57–1)
DEPRECATED RDW RBC AUTO: 51.5 FL (ref 37–54)
EGFRCR SERPLBLD CKD-EPI 2021: 58.2 ML/MIN/1.73
ERYTHROCYTE [DISTWIDTH] IN BLOOD BY AUTOMATED COUNT: 14.2 % (ref 12.3–15.4)
GLUCOSE SERPL-MCNC: 116 MG/DL (ref 65–99)
HCT VFR BLD AUTO: 31.8 % (ref 34–46.6)
HGB BLD-MCNC: 10.4 G/DL (ref 12–15.9)
MCH RBC QN AUTO: 32.4 PG (ref 26.6–33)
MCHC RBC AUTO-ENTMCNC: 32.7 G/DL (ref 31.5–35.7)
MCV RBC AUTO: 99.1 FL (ref 79–97)
PLATELET # BLD AUTO: 277 10*3/MM3 (ref 140–450)
PMV BLD AUTO: 10.3 FL (ref 6–12)
POTASSIUM SERPL-SCNC: 3.6 MMOL/L (ref 3.5–5.2)
PROCALCITONIN SERPL-MCNC: 0.05 NG/ML (ref 0–0.25)
QT INTERVAL: 307 MS
QTC INTERVAL: 410 MS
RBC # BLD AUTO: 3.21 10*6/MM3 (ref 3.77–5.28)
SODIUM SERPL-SCNC: 141 MMOL/L (ref 136–145)
WBC NRBC COR # BLD AUTO: 10 10*3/MM3 (ref 3.4–10.8)

## 2023-11-27 PROCEDURE — 94799 UNLISTED PULMONARY SVC/PX: CPT

## 2023-11-27 PROCEDURE — 94664 DEMO&/EVAL PT USE INHALER: CPT

## 2023-11-27 PROCEDURE — 99222 1ST HOSP IP/OBS MODERATE 55: CPT

## 2023-11-27 PROCEDURE — 85027 COMPLETE CBC AUTOMATED: CPT

## 2023-11-27 PROCEDURE — 25010000002 FUROSEMIDE PER 20 MG: Performed by: NURSE PRACTITIONER

## 2023-11-27 PROCEDURE — 94640 AIRWAY INHALATION TREATMENT: CPT

## 2023-11-27 PROCEDURE — 84145 PROCALCITONIN (PCT): CPT

## 2023-11-27 PROCEDURE — 94761 N-INVAS EAR/PLS OXIMETRY MLT: CPT

## 2023-11-27 PROCEDURE — 80048 BASIC METABOLIC PNL TOTAL CA: CPT

## 2023-11-27 RX ORDER — FUROSEMIDE 10 MG/ML
40 INJECTION INTRAMUSCULAR; INTRAVENOUS ONCE
Status: COMPLETED | OUTPATIENT
Start: 2023-11-27 | End: 2023-11-27

## 2023-11-27 RX ORDER — ATORVASTATIN CALCIUM 20 MG/1
40 TABLET, FILM COATED ORAL NIGHTLY
Status: DISCONTINUED | OUTPATIENT
Start: 2023-11-28 | End: 2023-12-02 | Stop reason: HOSPADM

## 2023-11-27 RX ORDER — POTASSIUM CHLORIDE 750 MG/1
40 TABLET, FILM COATED, EXTENDED RELEASE ORAL EVERY 4 HOURS
Status: DISPENSED | OUTPATIENT
Start: 2023-11-27 | End: 2023-11-27

## 2023-11-27 RX ORDER — SODIUM CHLORIDE FOR INHALATION 7 %
4 VIAL, NEBULIZER (ML) INHALATION
Status: DISCONTINUED | OUTPATIENT
Start: 2023-11-27 | End: 2023-11-30

## 2023-11-27 RX ADMIN — GUAIFENESIN 600 MG: 600 TABLET, EXTENDED RELEASE ORAL at 10:39

## 2023-11-27 RX ADMIN — METOPROLOL TARTRATE 100 MG: 50 TABLET, FILM COATED ORAL at 10:39

## 2023-11-27 RX ADMIN — DOCUSATE SODIUM 50MG AND SENNOSIDES 8.6MG 2 TABLET: 8.6; 5 TABLET, FILM COATED ORAL at 22:55

## 2023-11-27 RX ADMIN — IPRATROPIUM BROMIDE AND ALBUTEROL SULFATE 3 ML: 2.5; .5 SOLUTION RESPIRATORY (INHALATION) at 08:21

## 2023-11-27 RX ADMIN — FUROSEMIDE 40 MG: 20 INJECTION, SOLUTION INTRAMUSCULAR; INTRAVENOUS at 12:44

## 2023-11-27 RX ADMIN — GUAIFENESIN 600 MG: 600 TABLET, EXTENDED RELEASE ORAL at 22:54

## 2023-11-27 RX ADMIN — Medication 4 ML: at 08:26

## 2023-11-27 RX ADMIN — Medication 10 ML: at 22:56

## 2023-11-27 RX ADMIN — POTASSIUM CHLORIDE 20 MEQ: 750 TABLET, EXTENDED RELEASE ORAL at 10:39

## 2023-11-27 RX ADMIN — BUDESONIDE AND FORMOTEROL FUMARATE DIHYDRATE 2 PUFF: 160; 4.5 AEROSOL RESPIRATORY (INHALATION) at 08:30

## 2023-11-27 RX ADMIN — BISACODYL 5 MG: 5 TABLET, COATED ORAL at 22:55

## 2023-11-27 RX ADMIN — BUDESONIDE AND FORMOTEROL FUMARATE DIHYDRATE 2 PUFF: 160; 4.5 AEROSOL RESPIRATORY (INHALATION) at 20:15

## 2023-11-27 RX ADMIN — POTASSIUM CHLORIDE 40 MEQ: 750 TABLET, EXTENDED RELEASE ORAL at 00:53

## 2023-11-27 RX ADMIN — BUDESONIDE AND FORMOTEROL FUMARATE DIHYDRATE 2 PUFF: 160; 4.5 AEROSOL RESPIRATORY (INHALATION) at 00:25

## 2023-11-27 RX ADMIN — APIXABAN 2.5 MG: 2.5 TABLET, FILM COATED ORAL at 22:54

## 2023-11-27 RX ADMIN — TORSEMIDE 40 MG: 20 TABLET ORAL at 00:54

## 2023-11-27 RX ADMIN — METOPROLOL TARTRATE 100 MG: 50 TABLET, FILM COATED ORAL at 22:54

## 2023-11-27 RX ADMIN — AZITHROMYCIN 250 MG: 250 TABLET, FILM COATED ORAL at 10:39

## 2023-11-27 RX ADMIN — Medication 10 ML: at 10:40

## 2023-11-27 RX ADMIN — Medication 4 ML: at 00:29

## 2023-11-27 RX ADMIN — IPRATROPIUM BROMIDE AND ALBUTEROL SULFATE 3 ML: 2.5; .5 SOLUTION RESPIRATORY (INHALATION) at 20:07

## 2023-11-27 RX ADMIN — IPRATROPIUM BROMIDE AND ALBUTEROL SULFATE 3 ML: 2.5; .5 SOLUTION RESPIRATORY (INHALATION) at 00:22

## 2023-11-27 RX ADMIN — METOPROLOL TARTRATE 5 MG: 1 INJECTION, SOLUTION INTRAVENOUS at 02:10

## 2023-11-27 RX ADMIN — APIXABAN 2.5 MG: 2.5 TABLET, FILM COATED ORAL at 10:39

## 2023-11-27 RX ADMIN — BENZONATATE 200 MG: 100 CAPSULE ORAL at 22:56

## 2023-11-27 NOTE — PLAN OF CARE
Goal Outcome Evaluation:  Plan of Care Reviewed With: patient   Pt has had diuresis for CHF overnight and more IV lasix this am. Pt has been on oral antibiotics for quite some time. Pt will be admitted for additional monitoring. She gets up to the bedside commode. Potty hat in place for measuring. Pt is afib on monitor with hx. Pt alert and orient times 4, VSS, up with assist standby. Will continue to monitor.      Progress: no change

## 2023-11-27 NOTE — PROGRESS NOTES
ED OBSERVATION PROGRESS/DISCHARGE SUMMARY    Date of Admission: 11/26/2023   LOS: 0 days   PCP: Matt Rose MD    Final Diagnosis Congestive heart failure      Subjective     Hospital Outcome:     Pleasant afebrile ambulatory 89-year-old  female admitted to the ED observation unit for dyspnea.  Her chest x-ray yesterday showed cardiomegaly with suspected vascular congestion.  proBNP was noted to be 3,145.  She denies any chest discomfort this morning but does persist to have a mild dry cough.    Was given a dose of Lasix 40 mg IV yesterday but subjectively cannot tell a difference between urinary output versus her home torsemide.    She was seen and evaluated by APRN with pulmonology service who is checked a procalcitonin level and recommends against any additional IV antibiotics at this time, recommends patient to continue her azithromycin for bronchiectasis suppression.    Seen and eval by APRN with cardiology service today who recommends patient receiving an additional dose of Lasix 40 mg today and to monitor patient. She did endorse increased diuresis from this dose today and is agreeable to monitoring overnight. I have discussed case with the CCP team and based on her current progression and need for IV diuretics they advise that she would meet criteria for inpatient status. I have discussed case with Dr. Estes who has graciously accepted to admit to a tele bed.     ROS:  General: no fevers, chills  Respiratory: no cough, dyspnea  Cardiovascular: no chest pain, palpitations  Abdomen: No abdominal pain, nausea, vomiting, or diarrhea  Neurologic: No focal weakness    Objective   Physical Exam:  I have reviewed the vital signs.  Temp:  [97.4 °F (36.3 °C)-98.2 °F (36.8 °C)] 98.2 °F (36.8 °C)  Heart Rate:  [] 99  Resp:  [16-20] 20  BP: (117-152)/(77-89) 130/77  General Appearance:    Alert, cooperative, no distress, elderly  Head:    Normocephalic, atraumatic  Eyes:    Sclerae anicteric  Neck:    Supple, no mass  Lungs: Clear to auscultation bilaterally, respirations unlabored, diminished breath sounds to lower lung fields, dry hacking cough without hemoptysis present  Heart: Regular rate and rhythm, S1 and S2 normal, no murmur, rub or gallop  Abdomen:  Soft, non-tender, bowel sounds active, nondistended  Extremities: No clubbing, cyanosis, or edema to lower extremities  Pulses:  2+ and symmetric in distal lower extremities  Skin: No rashes   Neurologic: Oriented x3, Normal strength to extremities    Results Review:    I have reviewed the labs, radiology results and diagnostic studies.    Results from last 7 days   Lab Units 11/27/23  0404   WBC 10*3/mm3 10.00   HEMOGLOBIN g/dL 10.4*   HEMATOCRIT % 31.8*   PLATELETS 10*3/mm3 277     Results from last 7 days   Lab Units 11/27/23  0404 11/26/23  1851   SODIUM mmol/L 141 142   POTASSIUM mmol/L 3.6 3.0*   CHLORIDE mmol/L 102 101   CO2 mmol/L 24.7 27.0   BUN mg/dL 12 14   CREATININE mg/dL 0.92 0.99   CALCIUM mg/dL 9.2 9.4   BILIRUBIN mg/dL  --  0.3   ALK PHOS U/L  --  91   ALT (SGPT) U/L  --  12   AST (SGOT) U/L  --  22   GLUCOSE mg/dL 116* 116*     Imaging Results (Last 24 Hours)       Procedure Component Value Units Date/Time    XR Chest 1 View [[id number]] Collected: 11/26/23 1915     Updated: 11/26/23 1919    Narrative:      SINGLE VIEW OF THE CHEST     HISTORY: Shortness of air     COMPARISON: October 9, 2023     FINDINGS:  There is cardiomegaly. There is tortuosity and calcification of the  aorta. No pneumothorax is identified. There is some chronic scarring  identified within the minor fissure. There is some vascular congestion.  There are background changes of COPD. No definite acute infiltrates are  seen. There is an old left clavicle fracture.       Impression:      Cardiomegaly with suspected vascular congestion.     This report was finalized on 11/26/2023 7:16 PM by Dr. Kenyetta Valera M.D on Workstation: [id number]               I have reviewed  the medications.  ---------------------------------------------------------------------------------------------  Assessment & Plan   Assessment/Problem List    CHF (congestive heart failure)      Plan:    Congestive heart failure\atrial fibrillation  -Cardiac monitoring  -Vital signs every 4 hours   -Cardiology consult, recommends additional dose of Lasix 40 mg IV today  -EKG-atrial fibrillation  -Strict I&O  -Daily weights     COPD\bronchiectasis  -Continue home medications for COPD  -Continuous pulse ox has not revealed any hypoxia overnight  -Pulmonology consult, continue azithromycin for bronchiectasis suppression     Hypokalemia  -Resolved following electrolyte replacement protocol    Disposition: I anticipate patient will be discharged home tomorrow.       This note will serve as a transfer and progress note.    Sammi Alvarado, APRN 11/27/23 08:36 EST    I have worn appropriate PPE during this patient encounter, sanitized my hands both with entering and exiting patient's room.      55 minutes has been spent by [facility] providers in the care of this patient while under observation status

## 2023-11-27 NOTE — PLAN OF CARE
Goal Outcome Evaluation:         Pt admitted to obs for CHF exacerbation. IV Lasix given in ER. 1.75 L output overnight. Cardiology consulted.

## 2023-11-27 NOTE — ED PROVIDER NOTES
EMERGENCY DEPARTMENT ENCOUNTER  Room Number:  34/34  Date of encounter:  11/26/2023  PCP: Matt Rose MD  Patient Care Team:  Matt Rose MD as PCP - General (Family Medicine)  Marcie Robbins MD as Consulting Physician (Cardiology)  Nilesh Danielle MD as Consulting Physician (Urology)  Lina Morris RPH as Pharmacist  Lev Mckeon PharmD as Pharmacist (Pharmacy)  Rogelio Tucker MD as Consulting Physician (Pulmonary Disease)     HPI:  Context: Agnieszka Rizzo is a 89 y.o. female who presents to the ED c/o chief complaint of shortness of breath.  History supplied by patient and patient's family member.  Patient reports shortness of breath since Thanksgiving, worsening last several days, patient is unable to say if it occurs at rest or with exertion, denies any swelling of her lower extremities.  Patient endorses intermittent chest tightness, unable to provide further details.  Patient reports cough, cough is nonproductive.  No vomiting or diarrhea, no fever shakes chills or night sweats.  Patient reports history of CHF, history of bronchiectasis, reports that she is on 2 L oxygen at nighttime but not oxygen during the daytime.    MEDICAL HISTORY REVIEW  Reviewed in EPIC    PAST MEDICAL HISTORY  Active Ambulatory Problems     Diagnosis Date Noted    Primary hypertension 12/01/2016    Chronic coronary artery disease 12/01/2016    Familial hypercholesterolemia 12/01/2016    Mitral valve insufficiency 12/01/2016    Ventricular premature beats 12/01/2016    Ventricular tachycardia 12/01/2016    Acute on chronic respiratory failure with hypoxia 01/09/2017    Acid-fast bacteria present 01/09/2017    DNR (do not resuscitate) 03/12/2017    Chronic diastolic CHF (congestive heart failure) 03/12/2017    Asymptomatic bacteriuria 04/03/2017    Iron deficiency anemia 04/04/2017    Hypokalemia 04/04/2017    Bronchiectasis 04/17/2017    Pneumonia of both lungs due to infectious organism 05/31/2017    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    Permanent atrial fibrillation 06/09/2017    Anxiety 06/20/2017    Exposure to hepatitis A 04/20/2018    Medicare annual wellness visit, subsequent 04/18/2019    Abdominal pain 09/23/2019    Herpes infection 11/19/2019    Moderate asthma with acute exacerbation 12/02/2019    Bronchitis, acute, with bronchospasm 12/16/2019    Abnormal EKG 12/16/2019    Fall     Laceration of left upper extremity 07/16/2021    Multiple skin tears 07/16/2021    Alteration in anticoagulation     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    Clinical diagnosis of COVID-19 01/04/2022    Pneumonia of right lung due to infectious organism, unspecified part of lung 07/09/2022    COPD (chronic obstructive pulmonary disease)     Dizziness 09/09/2022    Bronchiectasis 02/20/2023    Sepsis due to pneumonia 04/03/2023    Acute pulmonary edema 08/04/2023    Electrolyte imbalance 08/16/2023     Resolved Ambulatory Problems     Diagnosis Date Noted    Pneumothorax of right lung after biopsy 11/12/2016    Pulmonary aspergillosis 11/16/2016    Heart failure, diastolic, with acute decompensation 12/01/2016    Persistent atrial fibrillation 12/01/2016    Pneumonia 01/01/2017    Pneumonia with the fungal infection aspergillosis 01/06/2017    Hyponatremia 02/08/2017    C. difficile colitis 03/11/2017    Sepsis 03/12/2017    CHF (congestive heart failure) 03/22/2017    Pancolitis 04/02/2017    Acute bronchitis due to parainfluenza virus 12/16/2019    COPD with acute exacerbation  12/16/2019    UTI (urinary tract infection) 12/19/2019    Acute on chronic diastolic congestive heart failure 10/04/2023    Acute on chronic diastolic heart failure 10/05/2023     Past Medical History:   Diagnosis Date    Aspergillus     Asthma     Atrial fibrillation     Atrial flutter     C. difficile diarrhea 03/11/2017    CAD (coronary artery disease)     Colitis     Cough     Cryoglobulinemia     Dyspnea on exertion     Hyperlipidemia      Hypertension     Hypoxia     Infectious viral hepatitis     Left shoulder pain     Leg swelling     Lesion of lung     Malignant hyperthermia due to anesthesia     Mild tricuspid regurgitation     MR (mitral regurgitation)     MVP (mitral valve prolapse)     Pneumothorax     SOB (shortness of breath)     Wheeze        PAST SURGICAL HISTORY  Past Surgical History:   Procedure Laterality Date    BRONCHOSCOPY N/A 11/12/2016    Procedure: BRONCHOSCOPY WITH FLUORO, BRUSHINGS, BAL, AND BIOPSIES;  Surgeon: Rogelio Tcuker MD;  Location: [facility] ENDOSCOPY;  Service:     BRONCHOSCOPY Bilateral 06/03/2017    Procedure: BRONCHOSCOPY with BAL ;  Surgeon: Sung King MD;  Location: [facility] ENDOSCOPY;  Service:     BRONCHOSCOPY N/A 12/17/2019    Procedure: BRONCHOSCOPY WITH WASHINGS;  Surgeon: Rogelio Tucker MD;  Location: [facility] ENDOSCOPY;  Service: Pulmonary    BRONCHOSCOPY N/A 07/15/2022    Procedure: BRONCHOSCOPY;  Surgeon: Rogelio Tucker MD;  Location: [facility] ENDOSCOPY;  Service: Pulmonary;  Laterality: N/A;  Pre: Pneumonia  Post: Pneumonia    BRONCHOSCOPY N/A 10/2/2023    Procedure: BRONCHOSCOPY WITH BRONCHIAL AVEOLAR LAVAGE;  Surgeon: Rogelio Tucker MD;  Location: [facility] ENDOSCOPY;  Service: Pulmonary;  Laterality: N/A;  PRE:BRONCHIECTASIS /   POST: SAME    CATARACT EXTRACTION EXTRACAPSULAR W/ INTRAOCULAR LENS IMPLANTATION      COLONOSCOPY      2013    D & C WITH SUCTION      HYSTERECTOMY      KNEE ARTHROSCOPY Left     Partial       FAMILY HISTORY  Family History   Problem Relation Age of Onset    Hypertension Mother     Stroke Mother     Heart disease Father     Hypertension Father     Cancer Brother     Cancer Son        SOCIAL HISTORY  Social History     Socioeconomic History    Marital status:    Tobacco Use    Smoking status: Never    Smokeless tobacco: Never    Tobacco comments:     caffiene daily   Vaping Use    Vaping Use: Never used   Substance and Sexual Activity    Alcohol use: Not Currently     Alcohol/week: 2.0  standard drinks of alcohol     Types: 1 Glasses of wine, 1 Shots of liquor per week    Drug use: No    Sexual activity: Defer     Partners: Male       ALLERGIES  Amlodipine besylate, Aspirin, Bactrim [sulfamethoxazole-trimethoprim], Erythromycin, Levaquin [levofloxacin], Nitrofurantoin, and Ramipril    The patient's allergies have been reviewed    REVIEW OF SYSTEMS  All systems reviewed and negative except for those discussed in HPI.     PHYSICAL EXAM  I have reviewed the triage vital signs and nursing notes.  ED Triage Vitals   Temp Heart Rate Resp BP SpO2   11/26/23 1754 11/26/23 1754 11/26/23 1754 11/26/23 1847 11/26/23 1754   97.4 °F (36.3 °C) 110 16 149/84 94 %      Temp src Heart Rate Source Patient Position BP Location FiO2 (%)   11/26/23 1754 11/26/23 1754 -- -- --   Tympanic Monitor          General: No acute distress.  HENT: NCAT, PERRL, Nares patent.  Eyes: no scleral icterus.  Neck: trachea midline, no ROM limitations.  CV: regular rhythm, regular rate.  Respiratory: normal effort, diffuse crackles greatest at bilateral lung bases.  Abdomen: soft, nondistended, NTTP, no rebound tenderness, no guarding or rigidity.  Musculoskeletal: no deformity.  Neuro: alert, moves all extremities, follows commands.  Skin: warm, dry.    LAB RESULTS  Recent Results (from the past 24 hour(s))   ECG 12 Lead ED Triage Standing Order; SOA    Collection Time: 11/26/23  6:43 PM   Result Value Ref Range    QT Interval 307 ms    QTC Interval 410 ms   Comprehensive Metabolic Panel    Collection Time: 11/26/23  6:51 PM    Specimen: Arm, Right; Blood   Result Value Ref Range    Glucose 116 (H) 65 - 99 mg/dL    BUN 14 8 - 23 mg/dL    Creatinine 0.99 0.57 - 1.00 mg/dL    Sodium 142 136 - 145 mmol/L    Potassium 3.0 (L) 3.5 - 5.2 mmol/L    Chloride 101 98 - 107 mmol/L    CO2 27.0 22.0 - 29.0 mmol/L    Calcium 9.4 8.2 - 9.6 mg/dL    Total Protein 6.6 6.0 - 8.5 g/dL    Albumin 3.9 3.5 - 5.2 g/dL    ALT (SGPT) 12 1 - 33 U/L    AST (SGOT)  22 1 - 32 U/L    Alkaline Phosphatase 91 39 - 117 U/L    Total Bilirubin 0.3 0.0 - 1.2 mg/dL    Globulin 2.7 gm/dL    A/G Ratio 1.4 g/dL    BUN/Creatinine Ratio 14.1 7.0 - 25.0    Anion Gap 14.0 5.0 - 15.0 mmol/L    eGFR 53.3 (L) >60.0 mL/min/1.73   BNP    Collection Time: 11/26/23  6:51 PM    Specimen: Arm, Right; Blood   Result Value Ref Range    proBNP 3,145.0 (H) 0.0 - 1,800.0 pg/mL   Single High Sensitivity Troponin T    Collection Time: 11/26/23  6:51 PM    Specimen: Arm, Right; Blood   Result Value Ref Range    HS Troponin T 25 (H) <14 ng/L   Green Top (Gel)    Collection Time: 11/26/23  6:51 PM   Result Value Ref Range    Extra Tube Hold for add-ons.    Lavender Top    Collection Time: 11/26/23  6:51 PM   Result Value Ref Range    Extra Tube hold for add-on    Gold Top - SST    Collection Time: 11/26/23  6:51 PM   Result Value Ref Range    Extra Tube Hold for add-ons.    Light Blue Top    Collection Time: 11/26/23  6:51 PM   Result Value Ref Range    Extra Tube Hold for add-ons.    CBC Auto Differential    Collection Time: 11/26/23  6:51 PM    Specimen: Arm, Right; Blood   Result Value Ref Range    WBC 13.29 (H) 3.40 - 10.80 10*3/mm3    RBC 3.38 (L) 3.77 - 5.28 10*6/mm3    Hemoglobin 11.2 (L) 12.0 - 15.9 g/dL    Hematocrit 33.6 (L) 34.0 - 46.6 %    MCV 99.4 (H) 79.0 - 97.0 fL    MCH 33.1 (H) 26.6 - 33.0 pg    MCHC 33.3 31.5 - 35.7 g/dL    RDW 14.5 12.3 - 15.4 %    RDW-SD 52.7 37.0 - 54.0 fl    MPV 9.4 6.0 - 12.0 fL    Platelets 281 140 - 450 10*3/mm3    Neutrophil % 72.6 42.7 - 76.0 %    Lymphocyte % 18.2 (L) 19.6 - 45.3 %    Monocyte % 7.5 5.0 - 12.0 %    Eosinophil % 0.7 0.3 - 6.2 %    Basophil % 0.4 0.0 - 1.5 %    Immature Grans % 0.6 (H) 0.0 - 0.5 %    Neutrophils, Absolute 9.65 (H) 1.70 - 7.00 10*3/mm3    Lymphocytes, Absolute 2.42 0.70 - 3.10 10*3/mm3    Monocytes, Absolute 1.00 (H) 0.10 - 0.90 10*3/mm3    Eosinophils, Absolute 0.09 0.00 - 0.40 10*3/mm3    Basophils, Absolute 0.05 0.00 - 0.20 10*3/mm3     Immature Grans, Absolute 0.08 (H) 0.00 - 0.05 10*3/mm3    nRBC 0.0 0.0 - 0.2 /100 WBC   Magnesium    Collection Time: 11/26/23  6:51 PM    Specimen: Arm, Right; Blood   Result Value Ref Range    Magnesium 2.2 1.7 - 2.3 mg/dL   Procalcitonin    Collection Time: 11/26/23  6:51 PM    Specimen: Arm, Right; Blood   Result Value Ref Range    Procalcitonin 0.05 0.00 - 0.25 ng/mL   COVID-19 and FLU A/B PCR, 1 HR TAT - Swab, Nasopharynx    Collection Time: 11/26/23  7:39 PM    Specimen: Nasopharynx; Swab   Result Value Ref Range    COVID19 Not Detected Not Detected - Ref. Range    Influenza A PCR Not Detected Not Detected    Influenza B PCR Not Detected Not Detected       I ordered the above labs and reviewed the results.    RADIOLOGY  XR Chest 1 View    Result Date: 11/26/2023  SINGLE VIEW OF THE CHEST  HISTORY: Shortness of air  COMPARISON: October 9, 2023  FINDINGS: There is cardiomegaly. There is tortuosity and calcification of the aorta. No pneumothorax is identified. There is some chronic scarring identified within the minor fissure. There is some vascular congestion. There are background changes of COPD. No definite acute infiltrates are seen. There is an old left clavicle fracture.      Cardiomegaly with suspected vascular congestion.  This report was finalized on 11/26/2023 7:16 PM by Dr. Kenyetta Valera M.D on Workstation: [id number]       I ordered the above noted radiological studies. I reviewed the images and results. I agree with the radiologist interpretation.    PROCEDURES  Procedures    MEDICATIONS GIVEN IN ER  Medications   sodium chloride 0.9 % flush 10 mL (has no administration in time range)   potassium chloride 10 mEq in 100 mL IVPB (10 mEq Intravenous New Bag 11/26/23 2038)     And   potassium chloride 10 mEq in 100 mL IVPB (has no administration in time range)     And   potassium chloride 10 mEq in 100 mL IVPB (has no administration in time range)     And   potassium chloride 10 mEq in 100 mL  IVPB (has no administration in time range)     And   potassium chloride 10 mEq in 100 mL IVPB (has no administration in time range)     And   potassium chloride 10 mEq in 100 mL IVPB (has no administration in time range)   furosemide (LASIX) injection 80 mg (80 mg Intravenous Given 11/26/23 2032)       PROGRESS, DATA ANALYSIS, CONSULTS, AND MEDICAL DECISION MAKING  A complete history and physical exam have been performed.  All available laboratory and imaging results have been reviewed by myself prior to disposition.    MDM    After the initial H&P, I discussed pertinent information from history and physical exam with patient/family.  Discussed differential diagnosis.  Discussed plan for ED evaluation/workup/treatment.  All questions answered.  Patient/family is agreeable with plan.  ED Course as of 11/26/23 2043   Sun Nov 26, 2023 1917 My differential diagnosis for dyspnea includes but is not limited to:  Asthma, COPD, pneumonia, pulmonary embolus, acute respiratory distress syndrome, pneumothorax, pleural effusion, pulmonary fibrosis, congestive heart failure, myocardial infarction, DKA, uremia, acidosis, sepsis, anemia, drug related, hyperventilation, CNS disease     [JG]   1918 EKG independently viewed and contemporaneously interpreted by ED physician. Time: 1843.  Rate 107.  Interpretation: Atrial fibrillation/flutter, no RVR, normal axis, normal QRS, nonspecific ST changes. [JG]   1922 When compared with prior EKG on 10/4/2023, no significant changes are present. [JG]   1948 I viewed chest x-ray in PACS, patient has pulmonary vascular congestion per my read. [JG]   2007 ED workup consistent with CHF exacerbation, patient satting 92% on room air, desaturates to 89% with speaking or coughing.  Patient does have trace leukocytosis but procalcitonin is normal, no antibiotics at present.  Diuresing, plan for admission. [JG]   2008 Patient reassessed.  Discussed ED findings, differential diagnosis, and the need  for admission for evaluation/treatment.  They are agreeable to admission and all questions were answered.     [JG]   2043 Phone call with Laura, JOHANA with [facility].  Discussed the patient, relevant history, exam, diagnostics, ED findings/progress, and concerns. They agree to admit the patient to telemetry observation under Dr. Haro. Care assumed by the admitting physician at this time.       [JG]      ED Course User Index  [JG] Tolu Rossi MD       AS OF 20:43 EST VITALS:    BP - 124/81  HR - 110  TEMP - 97.4 °F (36.3 °C) (Tympanic)  O2 SATS - 94%    DIAGNOSIS  Final diagnoses:   Acute on chronic congestive heart failure, unspecified heart failure type   Elevated troponin   Hyperglycemia   Leukocytosis, unspecified type   History of bronchiectasis     Critical care:  Total critical care time of 30 minutes is exclusive of any other billable procedures and includes time spent with direct patient care and observation, retrospective chart review, management of acute condition, and consultation with other physicians.      DISPOSITION  ADMISSION    Discussed treatment plan and reason for admission with pt/family and admitting physician.  Pt/family voiced understanding of the plan for admission for further testing/treatment as needed.        Tolu Rossi MD  11/26/23 2044

## 2023-11-27 NOTE — PROGRESS NOTES
[facility]  [phone]  Dr. Rogelio Tucker    Subjective:  LOS: 0    Chief Complaint: Dyspnea    Patient came in with shortness of breath.  Currently improved after diuresis.  Note recent admission and recent treatment with steroid taper for cryptogenic organizing pneumonia.  Patient has completed treatment and is pending CAT scan next week.  She feels much better after receiving diuretics yesterday.  She states she for much better on the steroids and is asking about staying on them long-term.    Objective   Vital Signs past 24hrs  Temp range: Temp (24hrs), Av.9 °F (36.6 °C), Min:97.4 °F (36.3 °C), Max:98.2 °F (36.8 °C)    BP range: BP: (107-152)/(58-89) 107/58  Pulse range: Heart Rate:  [] 90  Resp rate range: Resp:  [16-20] 17  Device (Oxygen Therapy): room air   Oxygen range:SpO2:  [92 %-100 %] 98 %       Physical Exam  Eyes:      Pupils: Pupils are equal, round, and reactive to light.   Cardiovascular:      Rate and Rhythm: Normal rate and regular rhythm.      Heart sounds: No murmur heard.  Pulmonary:      Effort: Pulmonary effort is normal.      Breath sounds: Rales present.   Abdominal:      General: Bowel sounds are normal.      Palpations: Abdomen is soft. There is no mass.      Tenderness: There is no abdominal tenderness.   Musculoskeletal:         General: No swelling.   Neurological:      Mental Status: She is alert.       Results Review:    I have reviewed the laboratory and imaging data since the last note by [facility] physician.  My annotations are noted in assessment and plan.      Result Review:  I have personally reviewed the results from last note by [facility] physician to 2023 15:18 EST and agree with these findings:  [x]  Laboratory list / accordion  [x]  Microbiology  [x]  Radiology  [x]  EKG/Telemetry   [x]  Cardiology/Vascular   []  Pathology  []  Old records  []  Other:    Medication Review:  I have reviewed the current MAR.  My annotations are noted in assessment and  plan.    apixaban, 2.5 mg, Oral, Q12H  azithromycin, 250 mg, Oral, Daily  budesonide-formoterol, 2 puff, Inhalation, BID - RT  [START ON 12/1/2023] citalopram, 20 mg, Oral, Daily  guaiFENesin, 600 mg, Oral, BID  ipratropium-albuterol, 3 mL, Nebulization, BID  metoprolol tartrate, 100 mg, Oral, BID  potassium chloride, 20 mEq, Oral, Daily  senna-docusate sodium, 2 tablet, Oral, BID  sodium chloride, 10 mL, Intravenous, Q12H  sodium chloride, 4 mL, Nebulization, BID - RT           Lines, Drains & Airways       Active LDAs       Name Placement date Placement time Site Days    Peripheral IV 11/26/23 2031 Right Antecubital 11/26/23 2031  Antecubital  less than 1                  Isolation status: No active isolations    Dietary Orders (From admission, onward)       Start     Ordered    11/27/23 1037  Diet: Cardiac Diets; Healthy Heart (2-3 Na+); Texture: Regular Texture (IDDSI 7); Fluid Consistency: Thin (IDDSI 0)  Diet Effective Now        References:    Diet Order [place]   Question Answer Comment   Diets: Cardiac Diets    Cardiac Diet: Healthy Heart (2-3 Na+)    Texture: Regular Texture (IDDSI 7)    Fluid Consistency: Thin (IDDSI 0)        11/27/23 1036                    [facility] Problems  Dyspnea  Acute on chronic HFpEF  Paroxysmal atrial fibrillation  Underlying bronchiectasis  Recent treatment for BOOP  Chronic KINA  COPD  Moderate MR  History aspergillosis      THESE ARE NEW MEDICAL PROBLEMS TO ME.    Plan of Treatment    From the pulmonary standpoint patient appears to doing well at this time.  Note cardiology input.    Recent treatment for BOOP.  She has completed prednisone course.  I asked her to delay for CT chest by 2 weeks and thereafter see me in the office.  She is asking about long-term steroids but this would not be a great idea for her given history KINA.    Atrial fibrillation and recurrent CHF.  Cardiology is managing.    No objections to discharge whenever considered stable from the cardiac  standpoint.    I spoke to her daughter at bedside.    Rogelio Tucker MD  11/27/23  15:18 EST      Part of this note may be an electronic transcription/translation of spoken language to printed text using the Dragon Dictation System.

## 2023-11-27 NOTE — CONSULTS
Date of Consultation: 23    Referral Provider: Shoaib Haro II*     Reason for Consultation: CHF    Encounter Provider: PJ Pittman    Group of Service: [facility]     Patient Name: Agnieszka Rizzo    :1930    Chief complaint: Fatigue, shortness of breath     History of Present Illness:  Agnieszka Rizzo is a 89 year old who follows with Dr. Robbins and has a past medical history that is significant for atrial fibrillation, anxiety, bronchiectasis with MAC and aspergillus, coronary artery disease, and congestive heart failure. She has required hospitalizations multiple times for IV diuresis. Her most recent hospitalization was in 2023. She had pneumonia and respiratory failure. She did require IV diuresis during this stay. Her echocardiogram showed an EF of 56% with severe biatrial enlargement, mild to moderate mitral regurgitation, moderate tricuspid regurgitation with right ventricular systolic pressure of 49 mmHg, and a small pericardial effusion without tamponade.     She presented to the ED on  with complaints of fatigue and shortness of breath that started on Thanksgiving. She normally weighs herself daily but has not over the holiday because she was concerned her weight was increased. Her normal weight is reportedly 121 lbs. She became fatigued and short of breath, she denied orthopnea or dyspnea with exertion. Her proBNP was found to be 3145, K+ 3.0 (this has normalized after replacement), respiratory viral panel was negative, and her chest xray showed cardiomegaly with suspected vascular congestion.     She is feeling much better this morning. Her breathing has improved significantly. She is able to lay flat without issue. She does have shortness of breath with exertion but reports that this is much improved. She had 1.7 liters out after her IV Lasix yesterday.       ECHO 10/6/23    Left ventricular systolic function is normal. Calculated left ventricular  EF = 56%    Normal right ventricular cavity size and systolic function noted.    The left atrial cavity is severely dilated.    The right atrial cavity is severely dilated.    Mild to moderate mitral valve regurgitation is present.    Moderate tricuspid valve regurgitation is present.    Calculated right ventricular systolic pressure from tricuspid regurgitation is 49 mmHg.    There is a small (<1cm) circumferential pericardial effusion. There is no evidence of cardiac tamponade.    Stress Test with Myocardial Perfusion 12/20/18  Left ventricular ejection fraction is normal (Calculated EF = 68%).  Myocardial perfusion imaging indicates a normal myocardial perfusion study with no evidence of ischemia.  Impressions are consistent with a low risk study.    Holter Monitor 1/5/23    An abnormal monitor study.    She was in atrial fibrillation throughout the time of this tracing with a heart rate range of 51-1 68 and an average heart rate of 90 bpm.    Symptoms correlated with atrial fibrillation.  However she was in atrial fibrillation throughout the time of this tracing.    Past Medical History:   Diagnosis Date    Aspergillus     Asthma     Atrial fibrillation     chronic    Atrial flutter     Bronchiectasis     C. difficile diarrhea 03/11/2017    CAD (coronary artery disease)     nonobstructive    Chronic diastolic CHF (congestive heart failure)     Colitis     Cough     Cryoglobulinemia     Dyspnea on exertion     Fall     Hyperlipidemia     Hypertension     Hyponatremia     Hypoxia     Infectious viral hepatitis     AGE 13    Left shoulder pain     Leg swelling     Lesion of lung     Malignant hyperthermia due to anesthesia     Mild tricuspid regurgitation     MR (mitral regurgitation)     mild    MVP (mitral valve prolapse)     Permanent atrial fibrillation     Pneumonia with the fungal infection aspergillosis 01/06/2017    Pneumothorax     SOB (shortness of breath)     UTI (urinary tract infection)     Wheeze      mild         Past Surgical History:   Procedure Laterality Date    BRONCHOSCOPY N/A 11/12/2016    Procedure: BRONCHOSCOPY WITH FLUORO, BRUSHINGS, BAL, AND BIOPSIES;  Surgeon: Rogelio Tucker MD;  Location: [facility] ENDOSCOPY;  Service:     BRONCHOSCOPY Bilateral 06/03/2017    Procedure: BRONCHOSCOPY with BAL ;  Surgeon: Sung King MD;  Location: [facility] ENDOSCOPY;  Service:     BRONCHOSCOPY N/A 12/17/2019    Procedure: BRONCHOSCOPY WITH WASHINGS;  Surgeon: Rogelio Tucker MD;  Location: [facility] ENDOSCOPY;  Service: Pulmonary    BRONCHOSCOPY N/A 07/15/2022    Procedure: BRONCHOSCOPY;  Surgeon: Rogelio Tucker MD;  Location: [facility] ENDOSCOPY;  Service: Pulmonary;  Laterality: N/A;  Pre: Pneumonia  Post: Pneumonia    BRONCHOSCOPY N/A 10/2/2023    Procedure: BRONCHOSCOPY WITH BRONCHIAL AVEOLAR LAVAGE;  Surgeon: Rogelio Tucker MD;  Location: [facility] ENDOSCOPY;  Service: Pulmonary;  Laterality: N/A;  PRE:BRONCHIECTASIS /   POST: SAME    CATARACT EXTRACTION EXTRACAPSULAR W/ INTRAOCULAR LENS IMPLANTATION      COLONOSCOPY      2013    D & C WITH SUCTION      HYSTERECTOMY      KNEE ARTHROSCOPY Left     Partial         Allergies   Allergen Reactions    Amlodipine Besylate Swelling    Aspirin GI Intolerance     Caused bleeding ulcers    Bactrim [Sulfamethoxazole-Trimethoprim] Nausea And Vomiting    Erythromycin Unknown (See Comments)     Patient does not recall type of reaction.    Levaquin [Levofloxacin] Unknown (See Comments)     Nausea      Nitrofurantoin Nausea Only and Nausea And Vomiting    Ramipril Other (See Comments)     Cough         No current facility-administered medications on file prior to encounter.     Current Outpatient Medications on File Prior to Encounter   Medication Sig Dispense Refill    acetaminophen (TYLENOL) 325 MG tablet Take 2 tablets by mouth Every 4 (Four) Hours As Needed for Moderate Pain.      albuterol sulfate  (90 Base) MCG/ACT inhaler Inhale 2 puffs Every 6 (Six) Hours As Needed for Wheezing or  Shortness of Air. 8 g 11    apixaban (ELIQUIS) 2.5 MG tablet tablet Take 1 tablet by mouth Every 12 (Twelve) Hours. Indications: Other - full anticoagulation 180 tablet 3    azithromycin (ZITHROMAX) 250 MG tablet Take 1 tablet by mouth Daily.      benzonatate (TESSALON) 200 MG capsule Take 1 capsule by mouth 3 (Three) Times a Day As Needed. Indications: Cough      cholecalciferol (VITAMIN D3) 25 MCG (1000 UT) tablet Take 1 tablet by mouth Daily.      [START ON 12/1/2023] citalopram (CeleXA) 20 MG tablet Take 1 tablet by mouth Daily.      FeroSul 325 (65 Fe) MG tablet TAKE ONE TABLET BY MOUTH DAILY WITH BREAKFAST (Patient taking differently: Take 1 tablet by mouth Daily.) 90 tablet 0    fexofenadine (ALLEGRA) 180 MG tablet Take 1 tablet by mouth Daily.      fluticasone (FLONASE) 50 MCG/ACT nasal spray 2 sprays into the nostril(s) as directed by provider At Night As Needed for Allergies.      fluticasone-salmeterol (ADVAIR HFA) 115-21 MCG/ACT inhaler Inhale 2 puffs 2 (Two) Times a Day.      guaiFENesin (MUCINEX) 600 MG 12 hr tablet Take 1 tablet by mouth 2 (Two) Times a Day.      ipratropium-albuterol (DUO-NEB) 0.5-2.5 mg/3 ml nebulizer Take 3 mL by nebulization 2 (Two) Times a Day.      metoprolol tartrate (LOPRESSOR) 100 MG tablet Take 1 tablet by mouth 2 (Two) Times a Day. 180 tablet 1    Multiple Vitamins-Minerals (PRESERVISION AREDS PO) Take 1 tablet by mouth 2 (Two) Times a Day.      potassium chloride (K-DUR,KLOR-CON) 20 MEQ CR tablet Take 1 tablet by mouth Daily. 90 tablet 3    torsemide (DEMADEX) 20 MG tablet Take 2 tablets by mouth 2 (Two) Times a Day for 360 days. 360 tablet 3    Lactobacillus (FLORAJEN ACIDOPHILUS) capsule Take 1 tablet by mouth Daily.      sodium chloride 0.9 % nebulizer solution Take 3 mL by nebulization 2 (Two) Times a Day.           Social History     Socioeconomic History    Marital status:    Tobacco Use    Smoking status: Never    Smokeless tobacco: Never    Tobacco comments:  "    caffiene daily   Vaping Use    Vaping Use: Never used   Substance and Sexual Activity    Alcohol use: Not Currently     Alcohol/week: 2.0 standard drinks of alcohol     Types: 1 Glasses of wine, 1 Shots of liquor per week    Drug use: No    Sexual activity: Defer     Partners: Male         Family History   Problem Relation Age of Onset    Hypertension Mother     Stroke Mother     Heart disease Father     Hypertension Father     Cancer Brother     Cancer Son        REVIEW OF SYSTEMS:   12 point ROS was performed and is negative except as outlined in HPI       Objective:     Vitals:    11/27/23 0713 11/27/23 0821 11/27/23 0826 11/27/23 0830   BP: 130/77      BP Location: Right arm      Patient Position: Lying      Pulse: 99      Resp: 17 18 20 20   Temp: 98.2 °F (36.8 °C)      TempSrc: Oral      SpO2: 97% 93% 100% 100%   Weight:       Height:         Body mass index is 23.41 kg/m².  Flowsheet Rows      Flowsheet Row First Filed Value   Admission Height 157.5 cm (62\") Documented at 11/26/2023 1835   Admission Weight 57.2 kg (126 lb) Documented at 11/26/2023 1835              Physical Exam  Vitals reviewed.   Constitutional:       General: She is not in acute distress.  HENT:      Head: Normocephalic.   Eyes:      Extraocular Movements: Extraocular movements intact.      Pupils: Pupils are equal, round, and reactive to light.   Neck:      Vascular: No carotid bruit.   Cardiovascular:      Rate and Rhythm: Rhythm irregularly irregular.      Pulses:           Radial pulses are 2+ on the right side and 2+ on the left side.        Dorsalis pedis pulses are 2+ on the right side and 2+ on the left side.        Posterior tibial pulses are 2+ on the right side and 2+ on the left side.      Heart sounds: Normal heart sounds, S1 normal and S2 normal. Heart sounds not distant. No murmur heard.     No friction rub. No gallop. No S3 or S4 sounds.   Pulmonary:      Effort: Pulmonary effort is normal.      Comments: Coarse breath " sounds posterior lower lobes  Abdominal:      General: Abdomen is flat. Bowel sounds are normal.      Palpations: Abdomen is soft.   Musculoskeletal:      Cervical back: Normal range of motion.      Right lower leg: No edema.      Left lower leg: No edema.   Skin:     General: Skin is warm and dry.   Neurological:      General: No focal deficit present.      Mental Status: She is alert and oriented to person, place, and time.   Psychiatric:         Mood and Affect: Mood normal.         Behavior: Behavior normal.         Thought Content: Thought content normal.         Judgment: Judgment normal.         Lab Review:                Results from last 7 days   Lab Units 11/27/23  0404   SODIUM mmol/L 141   POTASSIUM mmol/L 3.6   CHLORIDE mmol/L 102   CO2 mmol/L 24.7   BUN mg/dL 12   CREATININE mg/dL 0.92   GLUCOSE mg/dL 116*   CALCIUM mg/dL 9.2     Results from last 7 days   Lab Units 11/26/23  1851   HSTROP T ng/L 25*     Results from last 7 days   Lab Units 11/27/23  0404   WBC 10*3/mm3 10.00   HEMOGLOBIN g/dL 10.4*   HEMATOCRIT % 31.8*   PLATELETS 10*3/mm3 277             Results from last 7 days   Lab Units 11/26/23  1851   MAGNESIUM mg/dL 2.2           EKG (reviewed by me personally):              Assessment/Plan:   Acute on chronic diastolic congestive heart failure - she responded well to IV diuretics yesterday. I will give her another 40 mg of IV Lasix today. Her torsemide is being held.   Shortness of breath - improving, still some shortness of breath with exertion.   Hypokalemia - this has resolved after replacement. Continue to monitor given her diuresis.   Non-rheumatic mitral valve regurgitation - very mild on last ECHO (10/6/23)   Mild tricuspid regurgitation without pulmonary hypertension   Hypertension - at goal   Permanent atrial fibrillation - heart rate was elevated yesterday from 100-110's but has improved today. I suspect her shortness of breath was contributing to this. Continue Eliquis 2.5 mg BID.    Chronic coronary artery disease - diagnosed by cardiac catheterization in 2007. She had a normal nuclear stress test in 2018.   Hyperlipidemia  Bronchiectasis with MAC and aspergillus - follows with Dr. Tucker as an outpatient. Pulmonology is following.     She is feeling much better today. She is still having some shortness of breath with exertion but reports that this is significantly improved, will give additional IV diuretics today. I suspect she will be able to go home tomorrow.     Nelia Haq, APRN   11/27/23

## 2023-11-27 NOTE — PROGRESS NOTES
MD ATTESTATION NOTE    The JOHANA and I have discussed this patient's history, physical exam, and treatment plan.  I have reviewed the documentation and personally had a face to face interaction with the patient. I affirm the documentation and agree with the treatment and plan.  The attached note describes my personal findings.      I provided a substantive portion of the care of the patient.  I personally performed the physical exam in its entirety, and below are my findings.      Brief HPI: Patient presents complaining of shortness of breath for the past couple days.  History obtained from patient as well as patient's daughter.  Patient has a history of CHF and has had 2 prior episodes of volume overload requiring admission.  Patient cannot well-characterized her symptoms.    PHYSICAL EXAM  ED Triage Vitals   Temp Heart Rate Resp BP SpO2   11/26/23 1754 11/26/23 1754 11/26/23 1754 11/26/23 1847 11/26/23 1754   97.4 °F (36.3 °C) 110 16 149/84 94 %      Temp src Heart Rate Source Patient Position BP Location FiO2 (%)   11/26/23 1754 11/26/23 1754 11/26/23 2136 11/26/23 2136 --   Tympanic Monitor Lying Right arm          GENERAL: no acute distress  HENT: nares patent  EYES: no scleral icterus  CV: regular rhythm, normal rate  RESPIRATORY: normal effort, bibasilar lung crackles, speaks in full sentences  ABDOMEN: soft, nontender  MUSCULOSKELETAL: no deformity, no lower extremity edema or tenderness  NEURO: alert, moves all extremities, follows commands  PSYCH:  calm, cooperative  SKIN: warm, dry    Vital signs and nursing notes reviewed.        Plan:   IV Lasix  Cardiology consultation

## 2023-11-27 NOTE — CONSULTS
Group: [facility]         CONSULT NOTE    Patient Identification:  Agnieszka Rizzo  89 y.o.  female  8/7/1930  [id number]            Requesting physician: Dr. Haro    Reason for Consultation:  bronchiectasis    CC: shortness of breath    History of Present Illness:  Agnieszka Rizzo is a 89 year old female with a past medical history including chronic atrial fibrillation on apixaban, bronchiectasis, HFpEF, hypertension and hyperlipidemia.     She follows with Dr. Tucker in clinic for bronchiectasis and chronic hypoxic respiratory failure (2L nasal cannula). She is on chronic daily azithromycin. She was recently admitted in October for shortness of breath and felt to have cryptogenic organizing pneumonia and was discharged on long taper of steroid.     Patient returned to the ED on 11/26/23 with complaints of shortness of breath. Patient reports shortness of breath since Thanksgiving (11/23), progressively worsening over the past several days. Reports associated intermittent chest tightness and nonproductive cough.   ED evaluation included proBNP 3145.0, WBC 13.29, procalcitonin 0.05. CXR showed chronic changes, cardiomegaly and pulmonary vascular congestion.     She received Lasix for suspected fluid overload and was admitted to observation for further monitoring. We have been asked to see patient for management of her bronchiectasis.     Review of Systems:  Constitutional: Negative for fever, chills, diaphoresis, fatigue.   HEENT: Negative for congestion, sinus pain or pressure.  Respiratory: Negative for chest tightness, choking.  Positive for chronic cough, shortness of breath and wheezing.  Cardiac: Negative for chest pain, leg swelling or palpitations.  GI: Negative for constipation, diarrhea, nausea or vomiting.  Positive for abdominal distention.  : Negative for dysuria, frequency, urgency or flank pain.  Musculoskeletal: Negative for arthralgias or myalgias.  Neurological: Negative for dizziness,  headaches, lightheadedness or weakness.    Past Medical History:  Past Medical History:   Diagnosis Date    Aspergillus     Asthma     Atrial fibrillation     chronic    Atrial flutter     Bronchiectasis     C. difficile diarrhea 03/11/2017    CAD (coronary artery disease)     nonobstructive    Chronic diastolic CHF (congestive heart failure)     Colitis     Cough     Cryoglobulinemia     Dyspnea on exertion     Fall     Hyperlipidemia     Hypertension     Hyponatremia     Hypoxia     Infectious viral hepatitis     AGE 13    Left shoulder pain     Leg swelling     Lesion of lung     Malignant hyperthermia due to anesthesia     Mild tricuspid regurgitation     MR (mitral regurgitation)     mild    MVP (mitral valve prolapse)     Permanent atrial fibrillation     Pneumonia with the fungal infection aspergillosis 01/06/2017    Pneumothorax     SOB (shortness of breath)     UTI (urinary tract infection)     Wheeze     mild       Past Surgical History:  Past Surgical History:   Procedure Laterality Date    BRONCHOSCOPY N/A 11/12/2016    Procedure: BRONCHOSCOPY WITH FLUORO, BRUSHINGS, BAL, AND BIOPSIES;  Surgeon: Rogelio Tucker MD;  Location: [facility] ENDOSCOPY;  Service:     BRONCHOSCOPY Bilateral 06/03/2017    Procedure: BRONCHOSCOPY with BAL ;  Surgeon: Sung King MD;  Location: [facility] ENDOSCOPY;  Service:     BRONCHOSCOPY N/A 12/17/2019    Procedure: BRONCHOSCOPY WITH WASHINGS;  Surgeon: Rogelio Tucker MD;  Location: [facility] ENDOSCOPY;  Service: Pulmonary    BRONCHOSCOPY N/A 07/15/2022    Procedure: BRONCHOSCOPY;  Surgeon: Rogelio Tucker MD;  Location: [facility] ENDOSCOPY;  Service: Pulmonary;  Laterality: N/A;  Pre: Pneumonia  Post: Pneumonia    BRONCHOSCOPY N/A 10/2/2023    Procedure: BRONCHOSCOPY WITH BRONCHIAL AVEOLAR LAVAGE;  Surgeon: Rogelio Tucker MD;  Location: [facility] ENDOSCOPY;  Service: Pulmonary;  Laterality: N/A;  PRE:BRONCHIECTASIS /   POST: SAME    CATARACT EXTRACTION EXTRACAPSULAR W/ INTRAOCULAR LENS IMPLANTATION       COLONOSCOPY      2013    D & C WITH SUCTION      HYSTERECTOMY      KNEE ARTHROSCOPY Left     Partial        Home Meds:  Medications Prior to Admission   Medication Sig Dispense Refill Last Dose    acetaminophen (TYLENOL) 325 MG tablet Take 2 tablets by mouth Every 4 (Four) Hours As Needed for Moderate Pain.   Past Month    albuterol sulfate  (90 Base) MCG/ACT inhaler Inhale 2 puffs Every 6 (Six) Hours As Needed for Wheezing or Shortness of Air. 8 g 11 Past Week    apixaban (ELIQUIS) 2.5 MG tablet tablet Take 1 tablet by mouth Every 12 (Twelve) Hours. Indications: Other - full anticoagulation 180 tablet 3 11/26/2023    azithromycin (ZITHROMAX) 250 MG tablet Take 1 tablet by mouth Daily.   11/26/2023    benzonatate (TESSALON) 200 MG capsule Take 1 capsule by mouth 3 (Three) Times a Day As Needed. Indications: Cough   11/26/2023    cholecalciferol (VITAMIN D3) 25 MCG (1000 UT) tablet Take 1 tablet by mouth Daily.   11/26/2023    [START ON 12/1/2023] citalopram (CeleXA) 20 MG tablet Take 1 tablet by mouth Daily.   11/26/2023    FeroSul 325 (65 Fe) MG tablet TAKE ONE TABLET BY MOUTH DAILY WITH BREAKFAST (Patient taking differently: Take 1 tablet by mouth Daily.) 90 tablet 0 11/26/2023    fexofenadine (ALLEGRA) 180 MG tablet Take 1 tablet by mouth Daily.   11/26/2023    fluticasone (FLONASE) 50 MCG/ACT nasal spray 2 sprays into the nostril(s) as directed by provider At Night As Needed for Allergies.   Past Month    fluticasone-salmeterol (ADVAIR HFA) 115-21 MCG/ACT inhaler Inhale 2 puffs 2 (Two) Times a Day.   Past Month    guaiFENesin (MUCINEX) 600 MG 12 hr tablet Take 1 tablet by mouth 2 (Two) Times a Day.   11/26/2023    ipratropium-albuterol (DUO-NEB) 0.5-2.5 mg/3 ml nebulizer Take 3 mL by nebulization 2 (Two) Times a Day.   11/26/2023    metoprolol tartrate (LOPRESSOR) 100 MG tablet Take 1 tablet by mouth 2 (Two) Times a Day. 180 tablet 1 11/26/2023    Multiple Vitamins-Minerals (PRESERVISION AREDS PO) Take  "1 tablet by mouth 2 (Two) Times a Day.   11/26/2023    potassium chloride (K-DUR,KLOR-CON) 20 MEQ CR tablet Take 1 tablet by mouth Daily. 90 tablet 3 11/26/2023    torsemide (DEMADEX) 20 MG tablet Take 2 tablets by mouth 2 (Two) Times a Day for 360 days. 360 tablet 3 11/26/2023    Lactobacillus (FLORAJEN ACIDOPHILUS) capsule Take 1 tablet by mouth Daily.       sodium chloride 0.9 % nebulizer solution Take 3 mL by nebulization 2 (Two) Times a Day.          Allergies:  Allergies   Allergen Reactions    Amlodipine Besylate Swelling    Aspirin GI Intolerance     Caused bleeding ulcers    Bactrim [Sulfamethoxazole-Trimethoprim] Nausea And Vomiting    Erythromycin Unknown (See Comments)     Patient does not recall type of reaction.    Levaquin [Levofloxacin] Unknown (See Comments)     Nausea      Nitrofurantoin Nausea Only and Nausea And Vomiting    Ramipril Other (See Comments)     Cough       Social History:   Social History     Socioeconomic History    Marital status:    Tobacco Use    Smoking status: Never    Smokeless tobacco: Never    Tobacco comments:     caffiene daily   Vaping Use    Vaping Use: Never used   Substance and Sexual Activity    Alcohol use: Not Currently     Alcohol/week: 2.0 standard drinks of alcohol     Types: 1 Glasses of wine, 1 Shots of liquor per week    Drug use: No    Sexual activity: Defer     Partners: Male       Family History:  Family History   Problem Relation Age of Onset    Hypertension Mother     Stroke Mother     Heart disease Father     Hypertension Father     Cancer Brother     Cancer Son        Physical Exam:  /82 (BP Location: Right arm, Patient Position: Lying)   Pulse 104   Temp 97.8 °F (36.6 °C) (Oral)   Resp 18   Ht 157.5 cm (62.01\")   Wt 60.8 kg (134 lb)   SpO2 96%   BMI 24.50 kg/m²  Body mass index is 24.5 kg/m². 96% 60.8 kg (134 lb)    Constitutional: Elderly female pt, appears younger than stated age in bed, No acute respiratory distress, no accessory " muscle use  Head: - NCAT  Eyes: No pallor, Anicteric conjunctiva, EOMI.  ENMT:  Mallampati 3, no oral thrush. Dry MM.   NECK: Trachea midline, No thyromegaly, no palpable cervical lymphadenopathy  Heart: Tachycardic rate, irregular rhythm (atrial fibrillation), no murmur. No pedal edema   Lungs: MARIA ELENA +, expiratory wheezes, crackles throughout  Abdomen: Soft.  Upper abdominal distention, no tenderness, guarding or rigidity. No palpable masses  Extremities: Extremities warm and well perfused. No cyanosis/ clubbing  Neuro: Conscious, answers appropriately, no gross focal neuro deficits  Psych: Mood and affect appropriate    PPE recommended per [facility] infectious disease Isolation protocol for the current clinical scenario(as mentioned below) was followed.      LABS:  COVID19   Date Value Ref Range Status   11/26/2023 Not Detected Not Detected - Ref. Range Final       Lab Results   Component Value Date    CALCIUM 9.4 11/26/2023    PHOS 3.4 07/19/2022     Results from last 7 days   Lab Units 11/26/23  1851   MAGNESIUM mg/dL 2.2   SODIUM mmol/L 142   POTASSIUM mmol/L 3.0*   CHLORIDE mmol/L 101   CO2 mmol/L 27.0   BUN mg/dL 14   CREATININE mg/dL 0.99   GLUCOSE mg/dL 116*   CALCIUM mg/dL 9.4   WBC 10*3/mm3 13.29*   HEMOGLOBIN g/dL 11.2*   PLATELETS 10*3/mm3 281   ALT (SGPT) U/L 12   AST (SGOT) U/L 22   PROBNP pg/mL 3,145.0*   PROCALCITONIN ng/mL 0.05     Lab Results   Component Value Date    CKTOTAL 66 02/08/2017    CKMB 2.98 02/08/2017    TROPONINT 25 (H) 11/26/2023     Results from last 7 days   Lab Units 11/26/23  1851   HSTROP T ng/L 25*         Results from last 7 days   Lab Units 11/26/23  1851   PROCALCITONIN ng/mL 0.05         Results from last 7 days   Lab Units 11/26/23  1939   INFLUENZA B PCR  Not Detected             Lab Results   Component Value Date    TSH 3.100 08/27/2021     Estimated Creatinine Clearance: 30.5 mL/min (by C-G formula based on SCr of 0.99 mg/dL).         Imaging: I personally  visualized the images of scans/x-rays performed within last 3 days.      Assessment:  Chronic hypoxic respiratory failure, baseline 2 nasal cannula  Cryptogenic organizing pneumonia  Congestive heart failure with preserved EF  Chronic atrial fibrillation  Anticoagulation with apixiban  COPD/bronchiectasis  Hypokalemia  Ongoing steroid use  History of aspergillus     Recommendations:  Chronic hypoxic respiratory failure, baseline 2L nasal cannula  Oxygen requirements currently at baseline- continue to monitor to maintain SpO2 >90%.  Patient with elevated BNP and pulmonary vascular congestion on CXR- agree with diuresis.    COPD/ bronchiectasis   Cryptogenic organizing pneumonia  Denies acute change in cough, sputum.  Denies fatigue malaise, thoracic pain, or worsening oxygenation.  Denies fever.  WBC is elevated at 13.29, however patient cannot tell me if she still on her prescribed prednisone for organizing pneumonia.  Shortness of breath is likely secondary to fluid overload, however cannot rule out bronchiectasis exacerbation as cause.  Given recent antibiotic treatment and diagnosis of COPD, hold off antibiotics for now.  Follow inflammatory markers, monitor for fever, or increased oxygen requirements.  Procalcitonin ordered in the morning in addition to CBC.    Continue daily azithromycin for bronchiectasis suppression.  Mucus clearing strategies including scheduled bronchodilators with mucolytic nebulizer, flutter, Mucinex and CPT ordered.    Patient was placed in face mask upon entering room and kept mask on throughout our encounter. I wore full protective equipment throughout this patient encounter including a face mask, gown and gloves. Hand hygiene was performed before donning protective equipment and after removal when leaving the room.    Breanna Lay, APRN  11/26/2023  23:31 EST      Much of this encounter note is an electronic transcription/translation of spoken language to printed text using Dragon  Software.

## 2023-11-27 NOTE — H&P
[facility]   HISTORY AND PHYSICAL    Patient Name: Agnieszka Rizzo  : 1930  MRN: [id number]  Primary Care Physician:  Matt Rose MD  Date of admission: 2023    Subjective   Subjective     Chief Complaint:   Chief Complaint   Patient presents with    Shortness of Breath    Leg Swelling         HPI:    Agnieszka Rizzo is a 89 y.o. female, with past medical history including, but not limited to, atrial fibrillation, anxiety, chronic anticoagulation, bronchiectasis, coronary artery disease, congestive heart failure, hypokalemia, and ventricular tachycardia presented to the emergency department with a complaint of fatigue and shortness of breath.  She states that she normally weighs herself daily (normal weight of 121) has not weighed herself in several days due to the Thanksgiving holiday and knowing that she would weigh more.  Today, she states that she became very fatigued, took several naps, and noticed that she was short of breath.  She denies any orthopnea, or dyspnea on exertion.  She does state that she has a dry cough but that this is not new for her.  She denies any chest pain, nausea, vomiting, diarrhea, fever, or chills.  In the emergency department proBNP was 3145.0, potassium 3.0, this is being replaced, glucose 116, WBCs 13.29, all other lab work is at baseline for patient.  COVID and flu were all negative.  Chest x-ray shows cardiomegaly with suspected vascular congestion.  EKG shows atrial fibrillation with a rate of 107.  Patient had an echocardiogram on 10/6/2023 that shows an ejection fraction of 56%.  Cardiology and pulmonology have been consulted to see the patient in the a.m.    Review of Systems   All systems were reviewed and negative except for: What was mentioned above in the HPI    Personal History     Past Medical History:   Diagnosis Date    Aspergillus     Asthma     Atrial fibrillation     chronic    Atrial flutter     Bronchiectasis     C. difficile diarrhea  03/11/2017    CAD (coronary artery disease)     nonobstructive    Chronic diastolic CHF (congestive heart failure)     Colitis     Cough     Cryoglobulinemia     Dyspnea on exertion     Fall     Hyperlipidemia     Hypertension     Hyponatremia     Hypoxia     Infectious viral hepatitis     AGE 13    Left shoulder pain     Leg swelling     Lesion of lung     Malignant hyperthermia due to anesthesia     Mild tricuspid regurgitation     MR (mitral regurgitation)     mild    MVP (mitral valve prolapse)     Permanent atrial fibrillation     Pneumonia with the fungal infection aspergillosis 01/06/2017    Pneumothorax     SOB (shortness of breath)     UTI (urinary tract infection)     Wheeze     mild       Past Surgical History:   Procedure Laterality Date    BRONCHOSCOPY N/A 11/12/2016    Procedure: BRONCHOSCOPY WITH FLUORO, BRUSHINGS, BAL, AND BIOPSIES;  Surgeon: Rogelio Tucker MD;  Location: [facility] ENDOSCOPY;  Service:     BRONCHOSCOPY Bilateral 06/03/2017    Procedure: BRONCHOSCOPY with BAL ;  Surgeon: Sung King MD;  Location: [facility] ENDOSCOPY;  Service:     BRONCHOSCOPY N/A 12/17/2019    Procedure: BRONCHOSCOPY WITH WASHINGS;  Surgeon: Rogelio Tucker MD;  Location: [facility] ENDOSCOPY;  Service: Pulmonary    BRONCHOSCOPY N/A 07/15/2022    Procedure: BRONCHOSCOPY;  Surgeon: Rogelio Tucker MD;  Location: [facility] ENDOSCOPY;  Service: Pulmonary;  Laterality: N/A;  Pre: Pneumonia  Post: Pneumonia    BRONCHOSCOPY N/A 10/2/2023    Procedure: BRONCHOSCOPY WITH BRONCHIAL AVEOLAR LAVAGE;  Surgeon: Rogelio Tucker MD;  Location: [facility] ENDOSCOPY;  Service: Pulmonary;  Laterality: N/A;  PRE:BRONCHIECTASIS /   POST: SAME    CATARACT EXTRACTION EXTRACAPSULAR W/ INTRAOCULAR LENS IMPLANTATION      COLONOSCOPY      2013    D & C WITH SUCTION      HYSTERECTOMY      KNEE ARTHROSCOPY Left     Partial       Family History: family history includes Cancer in her brother and son; Heart disease in her father; Hypertension in her father and mother;  Stroke in her mother. Otherwise pertinent FHx was reviewed and not pertinent to current issue.    Social History:  reports that she has never smoked. She has never used smokeless tobacco. She reports that she does not currently use alcohol after a past usage of about 2.0 standard drinks of alcohol per week. She reports that she does not use drugs.    Home Medications:  Florajen Acidophilus, acetaminophen, albuterol sulfate HFA, apixaban, azithromycin, benzonatate, cholecalciferol, citalopram, ferrous sulfate, fexofenadine, fluticasone, fluticasone-salmeterol, guaiFENesin, ipratropium-albuterol, metoprolol tartrate, multivitamin with minerals, potassium chloride, sodium chloride, and torsemide    Allergies:  Allergies   Allergen Reactions    Amlodipine Besylate Swelling    Aspirin GI Intolerance     Caused bleeding ulcers    Bactrim [Sulfamethoxazole-Trimethoprim] Nausea And Vomiting    Erythromycin Unknown (See Comments)     Patient does not recall type of reaction.    Levaquin [Levofloxacin] Unknown (See Comments)     Nausea      Nitrofurantoin Nausea Only and Nausea And Vomiting    Ramipril Other (See Comments)     Cough       Objective   Objective     Vitals:   Temp:  [97.4 °F (36.3 °C)] 97.4 °F (36.3 °C)  Heart Rate:  [110] 110  Resp:  [16] 16  BP: (124-149)/(81-84) 124/81  Physical Exam    Constitutional: Awake, alert   Eyes: PERRLA, sclerae anicteric, no conjunctival injection   HENT: NCAT, mucous membranes moist   Neck: Supple, no thyromegaly, no lymphadenopathy, trachea midline   Respiratory: Clear to auscultation bilaterally, nonlabored respirations    Cardiovascular: RRR, no murmurs, rubs, or gallops, palpable pedal pulses bilaterally   Gastrointestinal: Positive bowel sounds, soft, nontender, nondistended   Musculoskeletal: No bilateral ankle edema, no clubbing or cyanosis to extremities   Psychiatric: Appropriate affect, cooperative   Neurologic: Oriented x 3, strength symmetric in all extremities,  Cranial Nerves grossly intact to confrontation, speech clear   Skin: No rashes     Result Review    Result Review:  I have personally reviewed the results from the time of this admission to 11/26/2023 20:54 EST and agree with these findings:  [x]  Laboratory list / accordion  [x]  Microbiology  [x]  Radiology  [x]  EKG/Telemetry   []  Cardiology/Vascular   []  Pathology  [x]  Old records  []  Other:    Assessment & Plan   Assessment / Plan     Brief Patient Summary:  Agnieszka Rizzo is a 89 y.o. female who was admitted to the observation unit for further evaluation and treatment of her congestive heart failure.    Active Hospital Problems:  Active Hospital Problems    Diagnosis     CHF (congestive heart failure)      Plan:   Congestive heart failure\atrial fibrillation  -Cardiac monitoring  -Vital signs every 4 hours   -Cardiology consult   -High-sensitivity troponin  -EKG-atrial fibrillation  -N.p.o. after midnight   -Strict I&O  -Daily weights    COPD\bronchiectasis  -Continue home medications for COPD  -Continuous pulse ox  -Pulmonology consult    Hypokalemia  -Potassium replacement protocol  -Magnesium 2.2  -Potassium 3.0  -Repeat labs in a.m.    DVT prophylaxis:  Mechanical DVT prophylaxis orders are present.    CODE STATUS:    Code Status (Patient has no pulse and is not breathing): CPR (Attempt to Resuscitate)  Medical Interventions (Patient has pulse or is breathing): Full Support    Admission Status:  I believe this patient meets observation status.    78 minutes have been spent by [facility] providers in the care of this patient while under observation status.      Appropriate PPE worn during patient encounter.  Hand hygeine performed before and after seeing the patient.      Electronically signed by PJ Ames, 11/26/23, 8:54 PM EST.

## 2023-11-28 LAB
ANION GAP SERPL CALCULATED.3IONS-SCNC: 9.5 MMOL/L (ref 5–15)
BASOPHILS # BLD AUTO: 0.05 10*3/MM3 (ref 0–0.2)
BASOPHILS NFR BLD AUTO: 0.6 % (ref 0–1.5)
BUN SERPL-MCNC: 15 MG/DL (ref 8–23)
BUN/CREAT SERPL: 13.3 (ref 7–25)
CALCIUM SPEC-SCNC: 9.5 MG/DL (ref 8.2–9.6)
CHLORIDE SERPL-SCNC: 100 MMOL/L (ref 98–107)
CO2 SERPL-SCNC: 28.5 MMOL/L (ref 22–29)
CREAT SERPL-MCNC: 1.13 MG/DL (ref 0.57–1)
DEPRECATED RDW RBC AUTO: 51.6 FL (ref 37–54)
EGFRCR SERPLBLD CKD-EPI 2021: 45.5 ML/MIN/1.73
EOSINOPHIL # BLD AUTO: 0.18 10*3/MM3 (ref 0–0.4)
EOSINOPHIL NFR BLD AUTO: 2.1 % (ref 0.3–6.2)
ERYTHROCYTE [DISTWIDTH] IN BLOOD BY AUTOMATED COUNT: 14.3 % (ref 12.3–15.4)
GLUCOSE SERPL-MCNC: 98 MG/DL (ref 65–99)
HBA1C MFR BLD: 6.1 % (ref 4.8–5.6)
HCT VFR BLD AUTO: 32.1 % (ref 34–46.6)
HGB BLD-MCNC: 10.4 G/DL (ref 12–15.9)
IMM GRANULOCYTES # BLD AUTO: 0.08 10*3/MM3 (ref 0–0.05)
IMM GRANULOCYTES NFR BLD AUTO: 0.9 % (ref 0–0.5)
IRON 24H UR-MRATE: 52 MCG/DL (ref 37–145)
IRON SATN MFR SERPL: 19 % (ref 20–50)
LYMPHOCYTES # BLD AUTO: 2.47 10*3/MM3 (ref 0.7–3.1)
LYMPHOCYTES NFR BLD AUTO: 29.1 % (ref 19.6–45.3)
MCH RBC QN AUTO: 32.3 PG (ref 26.6–33)
MCHC RBC AUTO-ENTMCNC: 32.4 G/DL (ref 31.5–35.7)
MCV RBC AUTO: 99.7 FL (ref 79–97)
MONOCYTES # BLD AUTO: 0.95 10*3/MM3 (ref 0.1–0.9)
MONOCYTES NFR BLD AUTO: 11.2 % (ref 5–12)
NEUTROPHILS NFR BLD AUTO: 4.75 10*3/MM3 (ref 1.7–7)
NEUTROPHILS NFR BLD AUTO: 56.1 % (ref 42.7–76)
NRBC BLD AUTO-RTO: 0 /100 WBC (ref 0–0.2)
PLATELET # BLD AUTO: 291 10*3/MM3 (ref 140–450)
PMV BLD AUTO: 9.9 FL (ref 6–12)
POTASSIUM SERPL-SCNC: 3.7 MMOL/L (ref 3.5–5.2)
RBC # BLD AUTO: 3.22 10*6/MM3 (ref 3.77–5.28)
SODIUM SERPL-SCNC: 138 MMOL/L (ref 136–145)
TIBC SERPL-MCNC: 279 MCG/DL (ref 298–536)
TRANSFERRIN SERPL-MCNC: 187 MG/DL (ref 200–360)
WBC NRBC COR # BLD AUTO: 8.48 10*3/MM3 (ref 3.4–10.8)

## 2023-11-28 PROCEDURE — 83540 ASSAY OF IRON: CPT | Performed by: INTERNAL MEDICINE

## 2023-11-28 PROCEDURE — 94799 UNLISTED PULMONARY SVC/PX: CPT

## 2023-11-28 PROCEDURE — 99232 SBSQ HOSP IP/OBS MODERATE 35: CPT | Performed by: INTERNAL MEDICINE

## 2023-11-28 PROCEDURE — 83036 HEMOGLOBIN GLYCOSYLATED A1C: CPT | Performed by: INTERNAL MEDICINE

## 2023-11-28 PROCEDURE — 80048 BASIC METABOLIC PNL TOTAL CA: CPT | Performed by: NURSE PRACTITIONER

## 2023-11-28 PROCEDURE — 94760 N-INVAS EAR/PLS OXIMETRY 1: CPT

## 2023-11-28 PROCEDURE — 94664 DEMO&/EVAL PT USE INHALER: CPT

## 2023-11-28 PROCEDURE — 94761 N-INVAS EAR/PLS OXIMETRY MLT: CPT

## 2023-11-28 PROCEDURE — 97161 PT EVAL LOW COMPLEX 20 MIN: CPT

## 2023-11-28 PROCEDURE — 85025 COMPLETE CBC W/AUTO DIFF WBC: CPT | Performed by: NURSE PRACTITIONER

## 2023-11-28 PROCEDURE — 84466 ASSAY OF TRANSFERRIN: CPT | Performed by: INTERNAL MEDICINE

## 2023-11-28 RX ORDER — CARVEDILOL 6.25 MG/1
6.25 TABLET ORAL 2 TIMES DAILY WITH MEALS
Status: DISCONTINUED | OUTPATIENT
Start: 2023-11-28 | End: 2023-11-29

## 2023-11-28 RX ORDER — FUROSEMIDE 40 MG/1
40 TABLET ORAL DAILY
Status: DISCONTINUED | OUTPATIENT
Start: 2023-11-28 | End: 2023-11-29

## 2023-11-28 RX ORDER — POTASSIUM CHLORIDE 750 MG/1
10 TABLET, FILM COATED, EXTENDED RELEASE ORAL DAILY
Status: DISCONTINUED | OUTPATIENT
Start: 2023-11-29 | End: 2023-11-30

## 2023-11-28 RX ADMIN — IPRATROPIUM BROMIDE AND ALBUTEROL SULFATE 3 ML: 2.5; .5 SOLUTION RESPIRATORY (INHALATION) at 20:03

## 2023-11-28 RX ADMIN — Medication 10 ML: at 21:46

## 2023-11-28 RX ADMIN — IPRATROPIUM BROMIDE AND ALBUTEROL SULFATE 3 ML: 2.5; .5 SOLUTION RESPIRATORY (INHALATION) at 07:35

## 2023-11-28 RX ADMIN — BUDESONIDE AND FORMOTEROL FUMARATE DIHYDRATE 2 PUFF: 160; 4.5 AEROSOL RESPIRATORY (INHALATION) at 07:38

## 2023-11-28 RX ADMIN — DOCUSATE SODIUM 50MG AND SENNOSIDES 8.6MG 2 TABLET: 8.6; 5 TABLET, FILM COATED ORAL at 21:45

## 2023-11-28 RX ADMIN — FUROSEMIDE 40 MG: 40 TABLET ORAL at 11:56

## 2023-11-28 RX ADMIN — GUAIFENESIN 600 MG: 600 TABLET, EXTENDED RELEASE ORAL at 08:09

## 2023-11-28 RX ADMIN — CARVEDILOL 6.25 MG: 6.25 TABLET, FILM COATED ORAL at 11:56

## 2023-11-28 RX ADMIN — Medication 10 ML: at 08:10

## 2023-11-28 RX ADMIN — AZITHROMYCIN 250 MG: 250 TABLET, FILM COATED ORAL at 08:09

## 2023-11-28 RX ADMIN — Medication 12.5 MG: at 11:56

## 2023-11-28 RX ADMIN — GUAIFENESIN 600 MG: 600 TABLET, EXTENDED RELEASE ORAL at 21:45

## 2023-11-28 RX ADMIN — Medication 4 ML: at 20:03

## 2023-11-28 RX ADMIN — METOPROLOL TARTRATE 100 MG: 50 TABLET, FILM COATED ORAL at 08:09

## 2023-11-28 RX ADMIN — BENZONATATE 200 MG: 100 CAPSULE ORAL at 21:45

## 2023-11-28 RX ADMIN — BUDESONIDE AND FORMOTEROL FUMARATE DIHYDRATE 2 PUFF: 160; 4.5 AEROSOL RESPIRATORY (INHALATION) at 20:21

## 2023-11-28 RX ADMIN — Medication 4 ML: at 07:36

## 2023-11-28 RX ADMIN — APIXABAN 2.5 MG: 2.5 TABLET, FILM COATED ORAL at 08:09

## 2023-11-28 RX ADMIN — ATORVASTATIN CALCIUM 40 MG: 20 TABLET, FILM COATED ORAL at 21:45

## 2023-11-28 RX ADMIN — APIXABAN 2.5 MG: 2.5 TABLET, FILM COATED ORAL at 21:45

## 2023-11-28 RX ADMIN — POTASSIUM CHLORIDE 20 MEQ: 750 TABLET, EXTENDED RELEASE ORAL at 08:09

## 2023-11-28 NOTE — PLAN OF CARE
"Goal Outcome Evaluation:  Plan of Care Reviewed With: (P) patient           Outcome Evaluation: (P) Pt is a 89 yo F who was admitted for SOA and found to be in CHF. Reports ind at baseline, uses a walker PRN, and lives alone. Pt performed LE exercises seated in chair, STS with SBA, and ambulated 170' with FWW and CGA. Pt reported fatigue and her legs \"feeling heavy\" after ambulation and stated she has not walked since being in the hospital. Encouraged to walk throughout the day with [facility] staff. Pt demonstrates decreased activity tolerance, generalized weakness, and slight balance deficits. PT will cont to follow to address functional impairments and progress activity as pt tolerates. Rec home upon medical d/c.      Anticipated Discharge Disposition (PT): (P) home, home with home health  "

## 2023-11-28 NOTE — PROGRESS NOTES
"DAILY PROGRESS NOTE  [facility]    Patient Identification:  Name: Agnieszka Rizzo  Age: 89 y.o.  Sex: female  :  1930  MRN: [id number]         Primary Care Physician: Matt Rose MD    Subjective:  Interval History: She is breathing better today.    Objective:    Scheduled Meds:apixaban, 2.5 mg, Oral, Q12H  atorvastatin, 40 mg, Oral, Nightly  azithromycin, 250 mg, Oral, Daily  budesonide-formoterol, 2 puff, Inhalation, BID - RT  carvedilol, 6.25 mg, Oral, BID With Meals  [START ON 2023] citalopram, 20 mg, Oral, Daily  furosemide, 40 mg, Oral, Daily  guaiFENesin, 600 mg, Oral, BID  ipratropium-albuterol, 3 mL, Nebulization, BID  [START ON 2023] potassium chloride, 10 mEq, Oral, Daily  senna-docusate sodium, 2 tablet, Oral, BID  sodium chloride, 10 mL, Intravenous, Q12H  sodium chloride, 4 mL, Nebulization, BID - RT  spironolactone, 12.5 mg, Oral, Daily      Continuous Infusions:     Vital signs in last 24 hours:  Temp:  [97.4 °F (36.3 °C)-99.1 °F (37.3 °C)] 97.6 °F (36.4 °C)  Heart Rate:  [] 104  Resp:  [16-20] 18  BP: ()/(60-80) 117/72    Intake/Output:    Intake/Output Summary (Last 24 hours) at 2023 1641  Last data filed at 2023 1202  Gross per 24 hour   Intake 240 ml   Output --   Net 240 ml       Exam:  /72 (BP Location: Left arm, Patient Position: Lying)   Pulse 104   Temp 97.6 °F (36.4 °C) (Oral)   Resp 18   Ht 157.5 cm (62.01\")   Wt 58 kg (127 lb 14.4 oz)   SpO2 98%   BMI 23.39 kg/m²     General Appearance:    Alert, cooperative, no distress   Head:    Normocephalic, without obvious abnormality, atraumatic   Eyes:       Throat:   Lips, tongue, gums normal   Neck:   Supple, symmetrical, trachea midline, no JVD   Lungs:     Clear to auscultation bilaterally, respirations unlabored   Chest Wall:    No tenderness or deformity    Heart:    Regular rate and rhythm, S1 and S2 normal, no murmur,no  Rub or gallop   Abdomen:     Soft, " nontender, bowel sounds active, no masses, no organomegaly    Extremities:   Extremities normal, atraumatic, no cyanosis or edema   Pulses:      Skin:   Skin is warm and dry,  no rashes or palpable lesions   Neurologic:   no focal deficits noted      Lab Results (last 72 hours)       Procedure Component Value Units Date/Time    Iron Profile [[id number]]  (Abnormal) Collected: 11/28/23 0238    Specimen: Blood Updated: 11/28/23 0850     Iron 52 mcg/dL      Iron Saturation (TSAT) 19 %      Transferrin 187 mg/dL      TIBC 279 mcg/dL     CBC & Differential [[id number]]  (Abnormal) Collected: 11/28/23 0238    Specimen: Blood Updated: 11/28/23 0609    Narrative:      The following orders were created for panel order CBC & Differential.  Procedure                               Abnormality         Status                     ---------                               -----------         ------                     CBC Auto Differential[[id number]]        Abnormal            Final result                 Please view results for these tests on the individual orders.    CBC Auto Differential [[id number]]  (Abnormal) Collected: 11/28/23 0238    Specimen: Blood Updated: 11/28/23 0609     WBC 8.48 10*3/mm3      RBC 3.22 10*6/mm3      Hemoglobin 10.4 g/dL      Hematocrit 32.1 %      MCV 99.7 fL      MCH 32.3 pg      MCHC 32.4 g/dL      RDW 14.3 %      RDW-SD 51.6 fl      MPV 9.9 fL      Platelets 291 10*3/mm3      Neutrophil % 56.1 %      Lymphocyte % 29.1 %      Monocyte % 11.2 %      Eosinophil % 2.1 %      Basophil % 0.6 %      Immature Grans % 0.9 %      Neutrophils, Absolute 4.75 10*3/mm3      Lymphocytes, Absolute 2.47 10*3/mm3      Monocytes, Absolute 0.95 10*3/mm3      Eosinophils, Absolute 0.18 10*3/mm3      Basophils, Absolute 0.05 10*3/mm3      Immature Grans, Absolute 0.08 10*3/mm3      nRBC 0.0 /100 WBC     Basic Metabolic Panel [[id number]]  (Abnormal) Collected: 11/28/23 0238    Specimen: Blood Updated: 11/28/23 0413      "Glucose 98 mg/dL      BUN 15 mg/dL      Creatinine 1.13 mg/dL      Sodium 138 mmol/L      Potassium 3.7 mmol/L      Chloride 100 mmol/L      CO2 28.5 mmol/L      Calcium 9.5 mg/dL      BUN/Creatinine Ratio 13.3     Anion Gap 9.5 mmol/L      eGFR 45.5 mL/min/1.73     Narrative:      GFR Normal >60  Chronic Kidney Disease <60  Kidney Failure <15    The GFR formula is only valid for adults with stable renal function between ages 18 and 70.    Hemoglobin A1c [[id number]]  (Abnormal) Collected: 11/28/23 0238    Specimen: Blood Updated: 11/28/23 0412     Hemoglobin A1C 6.10 %     Narrative:      Hemoglobin A1C Ranges:    Increased Risk for Diabetes  5.7% to 6.4%  Diabetes                     >= 6.5%  Diabetic Goal                < 7.0%    CBC (No Diff) [[id number]]  (Abnormal) Collected: 11/27/23 0404    Specimen: Blood from Arm, Left Updated: 11/27/23 0653     WBC 10.00 10*3/mm3      RBC 3.21 10*6/mm3      Hemoglobin 10.4 g/dL      Hematocrit 31.8 %      MCV 99.1 fL      MCH 32.4 pg      MCHC 32.7 g/dL      RDW 14.2 %      RDW-SD 51.5 fl      MPV 10.3 fL      Platelets 277 10*3/mm3     Procalcitonin [[id number]]  (Normal) Collected: 11/27/23 0404    Specimen: Blood from Arm, Left Updated: 11/27/23 0503     Procalcitonin 0.05 ng/mL     Narrative:      As a Marker for Sepsis (Non-Neonates):    1. <0.5 ng/mL represents a low risk of severe sepsis and/or septic shock.  2. >2 ng/mL represents a high risk of severe sepsis and/or septic shock.    As a Marker for Lower Respiratory Tract Infections that require antibiotic therapy:    PCT on Admission    Antibiotic Therapy       6-12 Hrs later    >0.5                Strongly Recommended  >0.25 - <0.5        Recommended   0.1 - 0.25          Discouraged              Remeasure/reassess PCT  <0.1                Strongly Discouraged     Remeasure/reassess PCT    As 28 day mortality risk marker: \"Change in Procalcitonin Result\" (>80% or <=80%) if Day 0 (or Day 1) and Day 4 values are " available. Refer to http://www.[facility]-pct-calculator.com    Change in PCT <=80%  A decrease of PCT levels below or equal to 80% defines a positive change in PCT test result representing a higher risk for 28-day all-cause mortality of patients diagnosed with severe sepsis for septic shock.    Change in PCT >80%  A decrease of PCT levels of more than 80% defines a negative change in PCT result representing a lower risk for 28-day all-cause mortality of patients diagnosed with severe sepsis or septic shock.       Basic Metabolic Panel [[id number]]  (Abnormal) Collected: 11/27/23 0404    Specimen: Blood from Arm, Left Updated: 11/27/23 0502     Glucose 116 mg/dL      BUN 12 mg/dL      Creatinine 0.92 mg/dL      Sodium 141 mmol/L      Potassium 3.6 mmol/L      Comment: Slight hemolysis detected by analyzer. Result may be falsely elevated.        Chloride 102 mmol/L      CO2 24.7 mmol/L      Calcium 9.2 mg/dL      BUN/Creatinine Ratio 13.0     Anion Gap 14.3 mmol/L      eGFR 58.2 mL/min/1.73     Narrative:      GFR Normal >60  Chronic Kidney Disease <60  Kidney Failure <15    The GFR formula is only valid for adults with stable renal function between ages 18 and 70.    COVID-19 and FLU A/B PCR, 1 HR TAT - Swab, Nasopharynx [[id number]]  (Normal) Collected: 11/26/23 1939    Specimen: Swab from Nasopharynx Updated: 11/26/23 2023     COVID19 Not Detected     Influenza A PCR Not Detected     Influenza B PCR Not Detected    Narrative:      Fact sheet for providers: https://www.fda.gov/media/090282/download    Fact sheet for patients: https://www.fda.gov/media/738075/download    Test performed by PCR.    Procalcitonin [[id number]]  (Normal) Collected: 11/26/23 1851    Specimen: Blood from Arm, Right Updated: 11/26/23 2004     Procalcitonin 0.05 ng/mL     Narrative:      As a Marker for Sepsis (Non-Neonates):    1. <0.5 ng/mL represents a low risk of severe sepsis and/or septic shock.  2. >2 ng/mL represents a high risk of severe  "sepsis and/or septic shock.    As a Marker for Lower Respiratory Tract Infections that require antibiotic therapy:    PCT on Admission    Antibiotic Therapy       6-12 Hrs later    >0.5                Strongly Recommended  >0.25 - <0.5        Recommended   0.1 - 0.25          Discouraged              Remeasure/reassess PCT  <0.1                Strongly Discouraged     Remeasure/reassess PCT    As 28 day mortality risk marker: \"Change in Procalcitonin Result\" (>80% or <=80%) if Day 0 (or Day 1) and Day 4 values are available. Refer to http://www.[facility]-pct-calculator.com    Change in PCT <=80%  A decrease of PCT levels below or equal to 80% defines a positive change in PCT test result representing a higher risk for 28-day all-cause mortality of patients diagnosed with severe sepsis for septic shock.    Change in PCT >80%  A decrease of PCT levels of more than 80% defines a negative change in PCT result representing a lower risk for 28-day all-cause mortality of patients diagnosed with severe sepsis or septic shock.       [place] Draw [[id number]] Collected: 11/26/23 1851    Specimen: Blood from Arm, Right Updated: 11/26/23 2001    Narrative:      The following orders were created for panel order [place] Draw.  Procedure                               Abnormality         Status                     ---------                               -----------         ------                     Green Top (Gel)[[id number]]                                  Final result               Lavender Top[[id number]]                                     Final result               Gold Top - SST[[id number]]                                   Final result               Light Blue Top[[id number]]                                   Final result                 Please view results for these tests on the individual orders.    Green Top (Gel) [[id number]] Collected: 11/26/23 1851    Specimen: Blood from Arm, Right Updated: 11/26/23 2001     Extra Tube Hold " for add-ons.     Comment: Auto resulted.       Lavender Top [[id number]] Collected: 11/26/23 1851    Specimen: Blood from Arm, Right Updated: 11/26/23 2001     Extra Tube hold for add-on     Comment: Auto resulted       Gold Top - SST [[id number]] Collected: 11/26/23 1851    Specimen: Blood from Arm, Right Updated: 11/26/23 2001     Extra Tube Hold for add-ons.     Comment: Auto resulted.       Light Blue Top [[id number]] Collected: 11/26/23 1851    Specimen: Blood from Arm, Right Updated: 11/26/23 2001     Extra Tube Hold for add-ons.     Comment: Auto resulted       Magnesium [[id number]]  (Normal) Collected: 11/26/23 1851    Specimen: Blood from Arm, Right Updated: 11/26/23 1950     Magnesium 2.2 mg/dL     Comprehensive Metabolic Panel [[id number]]  (Abnormal) Collected: 11/26/23 1851    Specimen: Blood from Arm, Right Updated: 11/26/23 1922     Glucose 116 mg/dL      BUN 14 mg/dL      Creatinine 0.99 mg/dL      Sodium 142 mmol/L      Potassium 3.0 mmol/L      Comment: Slight hemolysis detected by analyzer. Result may be falsely elevated.        Chloride 101 mmol/L      CO2 27.0 mmol/L      Calcium 9.4 mg/dL      Total Protein 6.6 g/dL      Albumin 3.9 g/dL      ALT (SGPT) 12 U/L      AST (SGOT) 22 U/L      Alkaline Phosphatase 91 U/L      Total Bilirubin 0.3 mg/dL      Globulin 2.7 gm/dL      A/G Ratio 1.4 g/dL      BUN/Creatinine Ratio 14.1     Anion Gap 14.0 mmol/L      eGFR 53.3 mL/min/1.73     Narrative:      GFR Normal >60  Chronic Kidney Disease <60  Kidney Failure <15    The GFR formula is only valid for adults with stable renal function between ages 18 and 70.    Single High Sensitivity Troponin T [[id number]]  (Abnormal) Collected: 11/26/23 1851    Specimen: Blood from Arm, Right Updated: 11/26/23 1921     HS Troponin T 25 ng/L     Narrative:      High Sensitive Troponin T Reference Range:  <14.0 ng/L- Negative Female for AMI  <22.0 ng/L- Negative Male for AMI  >=14 - Abnormal Female indicating possible  myocardial injury.  >=22 - Abnormal Male indicating possible myocardial injury.   Clinicians would have to utilize clinical acumen, EKG, Troponin, and serial changes to determine if it is an Acute Myocardial Infarction or myocardial injury due to an underlying chronic condition.         BNP [[id number]]  (Abnormal) Collected: 11/26/23 1851    Specimen: Blood from Arm, Right Updated: 11/26/23 1919     proBNP 3,145.0 pg/mL     Narrative:      This assay is used as an aid in the diagnosis of individuals suspected of having heart failure. It can be used as an aid in the diagnosis of acute decompensated heart failure (ADHF) in patients presenting with signs and symptoms of ADHF to the emergency department (ED). In addition, NT-proBNP of <300 pg/mL indicates ADHF is not likely.    Age Range Result Interpretation  NT-proBNP Concentration (pg/mL:      <50             Positive            >450                   Gray                 300-450                    Negative             <300    50-75           Positive            >900                  Gray                300-900                  Negative            <300      >75             Positive            >1800                  Gray                300-1800                  Negative            <300    CBC & Differential [[id number]]  (Abnormal) Collected: 11/26/23 1851    Specimen: Blood from Arm, Right Updated: 11/26/23 1903    Narrative:      The following orders were created for panel order CBC & Differential.  Procedure                               Abnormality         Status                     ---------                               -----------         ------                     CBC Auto Differential[[id number]]        Abnormal            Final result                 Please view results for these tests on the individual orders.    CBC Auto Differential [[id number]]  (Abnormal) Collected: 11/26/23 1851    Specimen: Blood from Arm, Right Updated: 11/26/23 1903     WBC 13.29  10*3/mm3      RBC 3.38 10*6/mm3      Hemoglobin 11.2 g/dL      Hematocrit 33.6 %      MCV 99.4 fL      MCH 33.1 pg      MCHC 33.3 g/dL      RDW 14.5 %      RDW-SD 52.7 fl      MPV 9.4 fL      Platelets 281 10*3/mm3      Neutrophil % 72.6 %      Lymphocyte % 18.2 %      Monocyte % 7.5 %      Eosinophil % 0.7 %      Basophil % 0.4 %      Immature Grans % 0.6 %      Neutrophils, Absolute 9.65 10*3/mm3      Lymphocytes, Absolute 2.42 10*3/mm3      Monocytes, Absolute 1.00 10*3/mm3      Eosinophils, Absolute 0.09 10*3/mm3      Basophils, Absolute 0.05 10*3/mm3      Immature Grans, Absolute 0.08 10*3/mm3      nRBC 0.0 /100 WBC           Data Review:  Results from last 7 days   Lab Units 11/28/23 0238 11/27/23  0404 11/26/23  1851   SODIUM mmol/L 138 141 142   POTASSIUM mmol/L 3.7 3.6 3.0*   CHLORIDE mmol/L 100 102 101   CO2 mmol/L 28.5 24.7 27.0   BUN mg/dL 15 12 14   CREATININE mg/dL 1.13* 0.92 0.99   GLUCOSE mg/dL 98 116* 116*   CALCIUM mg/dL 9.5 9.2 9.4     Results from last 7 days   Lab Units 11/28/23 0238 11/27/23  0404 11/26/23  1851   WBC 10*3/mm3 8.48 10.00 13.29*   HEMOGLOBIN g/dL 10.4* 10.4* 11.2*   HEMATOCRIT % 32.1* 31.8* 33.6*   PLATELETS 10*3/mm3 291 277 281         Results from last 7 days   Lab Units 11/28/23  0238   HEMOGLOBIN A1C % 6.10*     Lab Results   Lab Value Date/Time    TROPONINT 25 (H) 11/26/2023 1851    TROPONINT 31 (H) 10/04/2023 1953    TROPONINT 19 (H) 08/04/2023 2301    TROPONINT 20 (H) 08/04/2023 2055    TROPONINT 22 (H) 08/04/2023 1152    TROPONINT 25 (H) 04/04/2023 0215    TROPONINT 26 (H) 04/04/2023 0015    TROPONINT 30 (H) 04/03/2023 1851    TROPONINT <0.010 07/09/2022 0941    TROPONINT <0.010 02/11/2020 1250    TROPONINT <0.010 12/17/2019 0556    TROPONINT <0.010 12/16/2019 1811    TROPONINT <0.010 12/16/2019 1625    TROPONINT <0.010 12/16/2019 1350    TROPONINT <0.010 12/02/2019 0012    TROPONINT <0.010 02/08/2017 0636    TROPONINT <0.010 01/06/2017 1857    TROPONINT <0.010  "01/01/2017 1135    TROPONINT <0.010 11/19/2016 2102    TROPONINT <0.010 11/14/2016 1537    TROPONINT <0.010 11/12/2016 2117    TROPONINT <0.01 01/22/2016 1450         Results from last 7 days   Lab Units 11/26/23  1851   ALK PHOS U/L 91   BILIRUBIN mg/dL 0.3   ALT (SGPT) U/L 12   AST (SGOT) U/L 22         Results from last 7 days   Lab Units 11/28/23  0238   HEMOGLOBIN A1C % 6.10*     No results found for: \"POCGLU\"        Past Medical History:   Diagnosis Date    Aspergillus     Asthma     Atrial fibrillation     chronic    Atrial flutter     Bronchiectasis     C. difficile diarrhea 03/11/2017    CAD (coronary artery disease)     nonobstructive    Chronic diastolic CHF (congestive heart failure)     Colitis     Cough     Cryoglobulinemia     Dyspnea on exertion     Fall     Hyperlipidemia     Hypertension     Hyponatremia     Hypoxia     Infectious viral hepatitis     AGE 13    Left shoulder pain     Leg swelling     Lesion of lung     Malignant hyperthermia due to anesthesia     Mild tricuspid regurgitation     MR (mitral regurgitation)     mild    MVP (mitral valve prolapse)     Permanent atrial fibrillation     Pneumonia with the fungal infection aspergillosis 01/06/2017    Pneumothorax     SOB (shortness of breath)     UTI (urinary tract infection)     Wheeze     mild       Assessment:  Active Hospital Problems    Diagnosis  POA    **Acute on chronic diastolic congestive heart failure [I50.33]  Yes    COPD (chronic obstructive pulmonary disease) [J44.9]  Yes    Permanent atrial fibrillation [I48.21]  Yes    Iron deficiency anemia [D50.9]  Yes    Chronic respiratory failure with hypoxia [J96.11]  Yes    Primary hypertension [I10]  Yes    Chronic coronary artery disease [I25.10]  Yes      Resolved Hospital Problems   No resolved problems to display.       Plan:  Continue with diuresis.  Switching to oral Lasix.  Med adjustment per cardiology and pulmonary consults noted.  She sounds like she is doing better.  " Suspect she will be stable for discharge tomorrow.    Bronson Lopez MD  11/28/2023  16:41 EST

## 2023-11-28 NOTE — PROGRESS NOTES
"                                              LOS: 1 day   Patient Care Team:  Matt Rose MD as PCP - General (Family Medicine)  Marcie Robbins MD as Consulting Physician (Cardiology)  Nilesh Danielle MD as Consulting Physician (Urology)  Lina Morris [facility] as Pharmacist  Lev Mckeon, PharmD as Pharmacist (Pharmacy)  Rogelio Tucker MD as Consulting Physician (Pulmonary Disease)    Chief Complaint:  F/up COPD, organizing pneumonia and medical problems as below.    Subjective   Interval History  I reviewed the admission note, progress notes, PMH, PSH, Family hx, social history, imagings and prior records on this admission, summarized the finding in my note and formulated a transition of care plan.      On RA.  Denies dyspnea at rest.  Mild nonproductive cough.    REVIEW OF SYSTEMS:   CARDIOVASCULAR: No chest pain, chest pressure or chest discomfort. No palpitations or edema.   RESPIRATORY: No dyspnea at rest.  GASTROINTESTINAL: Diarrhea.  No nausea or abdominal pain.  CONSTITUTIONAL: No fever or chills.     Ventilator/Non-Invasive Ventilation Settings (From admission, onward)      None                  Physical Exam:     Vital Signs  Temp:  [97.4 °F (36.3 °C)-99.1 °F (37.3 °C)] 99.1 °F (37.3 °C)  Heart Rate:  [] 76  Resp:  [16-20] 18  BP: ()/(58-80) 131/80    Intake/Output Summary (Last 24 hours) at 11/28/2023 1354  Last data filed at 11/28/2023 1202  Gross per 24 hour   Intake 240 ml   Output --   Net 240 ml     Flowsheet Rows      Flowsheet Row First Filed Value   Admission Height 157.5 cm (62\") Documented at 11/26/2023 1835   Admission Weight 57.2 kg (126 lb) Documented at 11/26/2023 1835            PPE used per hospital policy    General Appearance:   Alert, cooperative, in no acute distress   ENMT:  Mallampati score 2, moist mucous membrane   Eyes:  Pupils equal and reactive to light. EOMI   Neck:   Trachea midline. No thyromegaly.   Lungs:   Minimal scattered Rales bilaterally more " prominent on the left base.  No wheezing.  No labored breathing.    Heart:   Regular rhythm and normal rate, normal S1 and S2, no         murmur   Skin:   No rash or ecchymosis   Abdomen:     Soft. No tenderness. No HSM.   Neuro/psych:  Conscious, alert, oriented x3. Strength 5/5 in upper and lower  ext.  Appropriate mood and affect   Extremities:  No cyanosis, clubbing or edema.  Warm extremities and well-perfused          Results Review:        Results from last 7 days   Lab Units 11/28/23 0238 11/27/23 0404 11/26/23  1851   SODIUM mmol/L 138 141 142   POTASSIUM mmol/L 3.7 3.6 3.0*   CHLORIDE mmol/L 100 102 101   CO2 mmol/L 28.5 24.7 27.0   BUN mg/dL 15 12 14   CREATININE mg/dL 1.13* 0.92 0.99   GLUCOSE mg/dL 98 116* 116*   CALCIUM mg/dL 9.5 9.2 9.4     Results from last 7 days   Lab Units 11/26/23  1851   HSTROP T ng/L 25*     Results from last 7 days   Lab Units 11/28/23 0238 11/27/23 0404 11/26/23  1851   WBC 10*3/mm3 8.48 10.00 13.29*   HEMOGLOBIN g/dL 10.4* 10.4* 11.2*   HEMATOCRIT % 32.1* 31.8* 33.6*   PLATELETS 10*3/mm3 291 277 281         Results from last 7 days   Lab Units 11/26/23  1851   PROBNP pg/mL 3,145.0*                           I reviewed the patient's new clinical results.        Medication Review:   apixaban, 2.5 mg, Oral, Q12H  atorvastatin, 40 mg, Oral, Nightly  azithromycin, 250 mg, Oral, Daily  budesonide-formoterol, 2 puff, Inhalation, BID - RT  carvedilol, 6.25 mg, Oral, BID With Meals  [START ON 12/1/2023] citalopram, 20 mg, Oral, Daily  furosemide, 40 mg, Oral, Daily  guaiFENesin, 600 mg, Oral, BID  ipratropium-albuterol, 3 mL, Nebulization, BID  [START ON 11/29/2023] potassium chloride, 10 mEq, Oral, Daily  senna-docusate sodium, 2 tablet, Oral, BID  sodium chloride, 10 mL, Intravenous, Q12H  sodium chloride, 4 mL, Nebulization, BID - RT  spironolactone, 12.5 mg, Oral, Daily             Diagnostic imaging:  I personally and independently reviewed the following images:  CXR  11/26/23: Cardiomegaly and vascular congestion.    Assessment     Dyspnea, multifactorial.  Acute on chronic HFpEF  Paroxysmal atrial fibrillation  Underlying bronchiectasis  Recent treatment for BOOP  Chronic KINA  COPD, no current exacerbation  Moderate MR  History of aspergillosis    All problems new to me    Plan       Symbicort 2 puffs twice daily  DuoNeb 4 times a day.  Resume home inhalers on discharge.  CHF management per cardiology: Hold Lasix and spironolactone.  Agree.  Anticoagulation: Eliquis 2.5 mg twice daily.    Sung King MD  11/28/23  13:54 EST            This note was dictated utilizing [employer] dictation

## 2023-11-28 NOTE — THERAPY EVALUATION
Patient Name: Agnieszka Rizzo  : 1930    MRN: [id number]                              Today's Date: 2023       Admit Date: 2023    Visit Dx:     ICD-10-CM ICD-9-CM   1. Acute on chronic congestive heart failure, unspecified heart failure type  I50.9 428.0   2. Elevated troponin  R79.89 790.6   3. Hyperglycemia  R73.9 790.29   4. Leukocytosis, unspecified type  D72.829 288.60   5. History of bronchiectasis  Z87.09 V12.69     Patient Active Problem List   Diagnosis    Primary hypertension    Chronic coronary artery disease    Familial hypercholesterolemia    Mitral valve insufficiency    Ventricular premature beats    Ventricular tachycardia    Chronic respiratory failure with hypoxia    Acid-fast bacteria present    DNR (do not resuscitate)    Chronic diastolic CHF (congestive heart failure)    Asymptomatic bacteriuria    Iron deficiency anemia    Hypokalemia    Bronchiectasis    Pneumonia of both lungs due to infectious organism    KINA (mycobacterium avium-intracellulare)    Permanent atrial fibrillation    Anxiety    Exposure to hepatitis A    Medicare annual wellness visit, subsequent    Abdominal pain    Herpes infection    Moderate asthma with acute exacerbation    Bronchitis, acute, with bronchospasm    Abnormal EKG    Fall    Laceration of left upper extremity    Multiple skin tears    Alteration in anticoagulation    Blind right eye    Clinical diagnosis of COVID-19    Pneumonia of right lung due to infectious organism, unspecified part of lung    COPD (chronic obstructive pulmonary disease)    Dizziness    Bronchiectasis    Sepsis due to pneumonia    Acute pulmonary edema    Electrolyte imbalance    CHF (congestive heart failure)    Acute on chronic diastolic congestive heart failure     Past Medical History:   Diagnosis Date    Aspergillus     Asthma     Atrial fibrillation     chronic    Atrial flutter     Bronchiectasis     C. difficile diarrhea 2017    CAD (coronary artery  disease)     nonobstructive    Chronic diastolic CHF (congestive heart failure)     Colitis     Cough     Cryoglobulinemia     Dyspnea on exertion     Fall     Hyperlipidemia     Hypertension     Hyponatremia     Hypoxia     Infectious viral hepatitis     AGE 13    Left shoulder pain     Leg swelling     Lesion of lung     Malignant hyperthermia due to anesthesia     Mild tricuspid regurgitation     MR (mitral regurgitation)     mild    MVP (mitral valve prolapse)     Permanent atrial fibrillation     Pneumonia with the fungal infection aspergillosis 01/06/2017    Pneumothorax     SOB (shortness of breath)     UTI (urinary tract infection)     Wheeze     mild     Past Surgical History:   Procedure Laterality Date    BRONCHOSCOPY N/A 11/12/2016    Procedure: BRONCHOSCOPY WITH FLUORO, BRUSHINGS, BAL, AND BIOPSIES;  Surgeon: Rogelio Tucker MD;  Location: [facility] ENDOSCOPY;  Service:     BRONCHOSCOPY Bilateral 06/03/2017    Procedure: BRONCHOSCOPY with BAL ;  Surgeon: Sung King MD;  Location: [facility] ENDOSCOPY;  Service:     BRONCHOSCOPY N/A 12/17/2019    Procedure: BRONCHOSCOPY WITH WASHINGS;  Surgeon: Rogelio Tucker MD;  Location: [facility] ENDOSCOPY;  Service: Pulmonary    BRONCHOSCOPY N/A 07/15/2022    Procedure: BRONCHOSCOPY;  Surgeon: Rogelio Tucker MD;  Location: [facility] ENDOSCOPY;  Service: Pulmonary;  Laterality: N/A;  Pre: Pneumonia  Post: Pneumonia    BRONCHOSCOPY N/A 10/2/2023    Procedure: BRONCHOSCOPY WITH BRONCHIAL AVEOLAR LAVAGE;  Surgeon: Rogelio Tucker MD;  Location: [facility] ENDOSCOPY;  Service: Pulmonary;  Laterality: N/A;  PRE:BRONCHIECTASIS /   POST: SAME    CATARACT EXTRACTION EXTRACAPSULAR W/ INTRAOCULAR LENS IMPLANTATION      COLONOSCOPY      2013    D & C WITH SUCTION      HYSTERECTOMY      KNEE ARTHROSCOPY Left     Partial      General Information       Row Name 11/28/23 1100          Physical Therapy Time and Intention    Document Type evaluation (P)   -SK     Mode of Treatment individual therapy;physical  therapy (P)   -SK       Row Name 11/28/23 1108          General Information    Patient Profile Reviewed yes (P)   -SK     Prior Level of Function independent:;ADL's;bed mobility;transfer;gait;all household mobility (P)   -SK     Existing Precautions/Restrictions fall (P)   -SK     Barriers to Rehab medically complex (P)   -SK       Row Name 11/28/23 1108          Living Environment    People in Home alone (P)   -SK       Row Name 11/28/23 1108          Cognition    Orientation Status (Cognition) oriented x 4 (P)   -SK       Row Name 11/28/23 1108          Safety Issues, Functional Mobility    Impairments Affecting Function (Mobility) endurance/activity tolerance;shortness of breath;strength (P)   -SK     Comment, Safety Issues/Impairments (Mobility) non skid socks, gait belt (P)   -SK               User Key  (r) = Recorded By, (t) = Taken By, (c) = Cosigned By      Initials Name Provider Type    Nickolas Araya, PT Student PT Student                   Mobility       Row Name 11/28/23 1117          Bed Mobility    Bed Mobility supine-sit;sit-supine (P)   -SK     Supine-Sit [place] (Bed Mobility) not tested (P)   -SK     Sit-Supine [place] (Bed Mobility) not tested (P)   -SK     Comment, (Bed Mobility) pt UIC (P)   -SK       Row Name 11/28/23 1117          Sit-Stand Transfer    Sit-Stand [place] (Transfers) standby assist;verbal cues (P)   -SK     Assistive Device (Sit-Stand Transfers) other (see comments) (P)   no AD  -SK     Comment, (Sit-Stand Transfer) pt used chair arms to push up (P)   -SK       Row Name 11/28/23 1117          Gait/Stairs (Locomotion)    [place] Level (Gait) contact guard;verbal cues;nonverbal cues (demo/gesture) (P)   -SK     Assistive Device (Gait) walker, front-wheeled (P)   -SK     Distance in Feet (Gait) 170 (P)   -SK     Deviations/Abnormal Patterns (Gait) cecilia decreased;gait speed decreased;stride length decreased (P)   -SK     Comment, (Gait/Stairs)  "distance limited d/t fatigue; pt verbalized fatigue and her legs \"feeling heavy\" after ambulation (P)   -SK               User Key  (r) = Recorded By, (t) = Taken By, (c) = Cosigned By      Initials Name Provider Type    Nickolas Araya, PT Student PT Student                   Obj/Interventions       Row Name 11/28/23 1118          Range of Motion Comprehensive    General Range of Motion bilateral lower extremity ROM WFL (P)   -[facility] Name 11/28/23 1118          Strength Comprehensive (MMT)    Comment, General Manual Muscle Testing (MMT) Assessment BLEs at least 3+/5 (P)   -SK       Row Name 11/28/23 1118          Motor Skills    Therapeutic Exercise -- (P)   10x BLE AP, LAQ  -SK       Row Name 11/28/23 1118          Balance    Balance Assessment standing static balance;standing dynamic balance (P)   -SK     Static Standing Balance verbal cues;contact guard (P)   -SK     Dynamic Standing Balance verbal cues;contact guard (P)   -SK     Position/Device Used, Standing Balance walker, front-wheeled (P)   -SK               User Key  (r) = Recorded By, (t) = Taken By, (c) = Cosigned By      Initials Name Provider Type    Nickolas Araya, PT Student PT Student                   Goals/Plan       Row Name 11/28/23 1122          Bed Mobility Goal 1 (PT)    Activity/Assistive Device (Bed Mobility Goal 1, PT) bed mobility activities, all (P)   -SK     [place] Level/Cues Needed (Bed Mobility Goal 1, PT) independent (P)   -SK     Time Frame (Bed Mobility Goal 1, PT) 1 week (P)   -SK       Row Name 11/28/23 1122          Transfer Goal 1 (PT)    Activity/Assistive Device (Transfer Goal 1, PT) transfers, all (P)   -SK     [place] Level/Cues Needed (Transfer Goal 1, PT) independent (P)   -SK     Time Frame (Transfer Goal 1, PT) 1 week (P)   -SK       Row Name 11/28/23 1122          Gait Training Goal 1 (PT)    Activity/Assistive Device (Gait Training Goal 1, PT) gait (walking locomotion);assistive " "device use;walker, rolling (P)   -SK     Distance (Gait Training Goal 1, PT) 200 (P)   -SK     Time Frame (Gait Training Goal 1, PT) 1 week (P)   -SK       Row Name 11/28/23 1122          Therapy Assessment/Plan (PT)    Planned Therapy Interventions (PT) balance training;bed mobility training;gait training;home exercise program;patient/family education;transfer training;strengthening (P)   -SK               User Key  (r) = Recorded By, (t) = Taken By, (c) = Cosigned By      Initials Name Provider Type    Nickolas Araya, PT Student PT Student                   Clinical Impression       Row Name 11/28/23 1119          Pain    Pretreatment Pain Rating 0/10 - no pain (P)   -SK     Posttreatment Pain Rating 0/10 - no pain (P)   -SK       Row Name 11/28/23 1119          Plan of Care Review    Plan of Care Reviewed With patient (P)   -SK     Outcome Evaluation Pt is a 89 yo F who was admitted for SOA and found to be in CHF. Reports ind at baseline, uses a walker PRN, and lives alone. Pt performed LE exercises seated in chair, STS with SBA, and ambulated 170' with FWW and CGA. Pt reported fatigue and her legs \"feeling heavy\" after ambulation and stated she has not walked since being in the hospital. Encouraged to walk throughout the day with [facility] staff. Pt demonstrates decreased activity tolerance, generalized weakness, and slight balance deficits. PT will cont to follow to address functional impairments and progress activity as pt tolerates. Rec home upon medical d/c. (P)   -SK       Row Name 11/28/23 1111          Therapy Assessment/Plan (PT)    Patient/Family Therapy Goals Statement (PT) return to PLOF (P)   -SK     Rehab Potential (PT) good, to achieve stated therapy goals (P)   -SK     Criteria for Skilled Interventions Met (PT) skilled treatment is necessary (P)   -SK     Therapy Frequency (PT) 3 times/wk (P)   -SK       Row Name 11/28/23 1114          Positioning and Restraints    Pre-Treatment Position " sitting in chair/recliner (P)   -SK     Post Treatment Position chair (P)   -SK     In Chair reclined;call light within reach;encouraged to call for assist;exit alarm on (P)   -SK               User Key  (r) = Recorded By, (t) = Taken By, (c) = Cosigned By      Initials Name Provider Type    Nickolas Araya, PT Student PT Student                   Outcome Measures       Row Name 11/28/23 1123          How much help from another person do you currently need...    Turning from your back to your side while in flat bed without using bedrails? 4 (P)   -SK     Moving from lying on back to sitting on the side of a flat bed without bedrails? 4 (P)   -SK     Moving to and from a bed to a chair (including a wheelchair)? 3 (P)   -SK     Standing up from a chair using your arms (e.g., wheelchair, bedside chair)? 4 (P)   -SK     Climbing 3-5 steps with a railing? 2 (P)   -SK     To walk in hospital room? 3 (P)   -SK     AM-PAC 6 Clicks Score (PT) 20 (P)   -SK     Highest Level of Mobility Goal 6 --> Walk 10 steps or more (P)   -SK       Row Name 11/28/23 1123          Functional Assessment    Outcome Measure Options AM-PAC 6 Clicks Basic Mobility (PT) (P)   -SK               User Key  (r) = Recorded By, (t) = Taken By, (c) = Cosigned By      Initials Name Provider Type    Nickolas Araya, PT Student PT Student                                 Physical Therapy Education       Title: PT OT SLP Therapies (Done)       Topic: Physical Therapy (Done)       Point: Mobility training (Done)       Learning Progress Summary             Patient Acceptance, E,D,TB, VU by SK at 11/28/2023 1123                         Point: Home exercise program (Done)       Learning Progress Summary             Patient Acceptance, E,D,TB, VU by SK at 11/28/2023 1123                         Point: Body mechanics (Done)       Learning Progress Summary             Patient Acceptance, E,D,TB, VU by SK at 11/28/2023 1123                          "Point: Precautions (Done)       Learning Progress Summary             Patient Acceptance, E,D,TB, VU by SK at 11/28/2023 1123                                         User Key       Initials Effective Dates Name Provider Type Discipline    SK 10/10/23 -  Nickolas Nathan, PT Student PT Student PT                  PT Recommendation and Plan  Planned Therapy Interventions (PT): (P) balance training, bed mobility training, gait training, home exercise program, patient/family education, transfer training, strengthening  Plan of Care Reviewed With: (P) patient  Outcome Evaluation: (P) Pt is a 89 yo F who was admitted for SOA and found to be in CHF. Reports ind at baseline, uses a walker PRN, and lives alone. Pt performed LE exercises seated in chair, STS with SBA, and ambulated 170' with FWW and CGA. Pt reported fatigue and her legs \"feeling heavy\" after ambulation and stated she has not walked since being in the hospital. Encouraged to walk throughout the day with [facility] staff. Pt demonstrates decreased activity tolerance, generalized weakness, and slight balance deficits. PT will cont to follow to address functional impairments and progress activity as pt tolerates. Rec home upon medical d/c.     Time Calculation:         PT Charges       Row Name 11/28/23 1124             Time Calculation    Start Time 1040 (P)   -SK      Stop Time 1048 (P)   -SK      Time Calculation (min) 8 min (P)   -SK      PT Received On 11/28/23 (P)   -SK      PT - Next Appointment 11/30/23 (P)   -SK      PT Goal Re-Cert Due Date 12/05/23 (P)   -SK                User Key  (r) = Recorded By, (t) = Taken By, (c) = Cosigned By      Initials Name Provider Type    SK Nickolas Nathan, PT Student PT Student                  Therapy Charges for Today       Code Description Service Date Service Provider Modifiers Qty    39505470702 HC PT EVAL LOW COMPLEXITY 2 11/28/2023 Nickolas Nathan, PT Student GP 1            PT G-Codes  Outcome Measure " Options: (P) AM-PAC 6 Clicks Basic Mobility (PT)  AM-PAC 6 Clicks Score (PT): (P) 20  PT Discharge Summary  Anticipated Discharge Disposition (PT): (P) home, home with home health    Nickolas Nathan, PT Student  11/28/2023

## 2023-11-28 NOTE — PLAN OF CARE
Goal Outcome Evaluation:  Plan of Care Reviewed With: patient     Daily Care Plan Summary: Heart Failure    Diuretic in use (IV or PO):   IV    Daily weight (up or down):    loss: 5lbs    Output > Intake (yes/no):Yes    O2 Requirements (current, any change?):  2 liters/min via nasal cannula    Symptoms noted with Activity (Respiratory Tolerance, functional state):     increased shortness of breath with ambulation    Anticipated Discharge Plans:     TBD      Progress: no change  Outcome Evaluation: Pt admitted with complaints of exertional dyspnea, was found to be in acute on chronic CHF. Pt is also longterm oral Abx use due to bronchiectasis. Pt up with walker & standby assist, cognitively intact, in A. Fib, rate controlled in 70-80s. Did require supplemental oxygen due to increased shortness of breath with frequent ambulation to bathroom, saturation %. Takes medications whole without issues. Chemistry results reviewed, pending CBC results. Will continue to monitor

## 2023-11-28 NOTE — DISCHARGE PLACEMENT REQUEST
"Svetlana Rizzo (89 y.o. Female)       Date of Birth   08/07/1930    Social Security Number       Address   [address]  [facility] [address]    Home Phone   [phone]    MRN   [id number]       Jew   Restorationist    Marital Status                               Admission Date   11/26/23    Admission Type   Emergency    Admitting Provider   Aftab Estes MD    Attending Provider   Bronson Lopez MD    [facility]       Discharge Date       Discharge Disposition       Discharge Destination                                 Attending Provider: Bronson Lopez MD    Allergies: Amlodipine Besylate, Aspirin, Bactrim [Sulfamethoxazole-trimethoprim], Erythromycin, Levaquin [Levofloxacin], Nitrofurantoin, Ramipril    Isolation: None   Infection: None   Code Status: CPR    Ht: 157.5 cm (62.01\")   Wt: 58 kg (127 lb 14.4 oz)    Admission Cmt: None   Principal Problem: Acute on chronic diastolic congestive heart failure [I50.33]                   Active Insurance as of 11/26/2023       Primary Coverage       Payor Plan Insurance Group Employer/Plan Group    MEDICARE MEDICARE A & B        Payor Plan Address Payor Plan Phone Number Payor Plan Fax Number Effective Dates    [address] [phone]  8/1/1995 - None Entered    [facility] [address]         Subscriber Name Subscriber Birth Date Member ID       SVETLANA RIZZO 8/7/1930 [id number]               Secondary Coverage       Payor Plan Insurance Group Employer/Plan Group    AARP [employer] SUP AARP HEALTH CARE OPTIONS PLAN F       Payor Plan Address Payor Plan Phone Number Payor Plan Fax Number Effective Dates    [facility] [phone]  7/1/2014 - None Entered    [address]       [facility] [address]         Subscriber Name Subscriber Birth Date Member ID       SVETLANA RIZZO 8/7/1930 [id number]                     Emergency Contacts        (Rel.) Home Phone Work Phone Mobile Phone    " Michelle Zaldivar (Daughter) [phone] -- [phone]    Handy Rizzo (Son) [phone] -- --    Isaias Preciado (son n [facility]) (Relative) [phone] -- --

## 2023-11-28 NOTE — CASE MANAGEMENT/SOCIAL WORK
Discharge Planning Assessment  [facility]     Patient Name: Agnieszka Rizzo  MRN: [id number]  Today's Date: 11/28/2023    Admit Date: 11/26/2023    Plan: Home with pending HH   Discharge Needs Assessment       Row Name 11/28/23 1447       Living Environment    People in Home alone    Current Living Arrangements home    Potentially Unsafe Housing Conditions none    Primary Care Provided by self    Provides Primary Care For no one    Family Caregiver if Needed child(graciela), adult    Quality of Family Relationships helpful;involved;supportive    Able to Return to Prior Arrangements yes       Resource/Environmental Concerns    Resource/Environmental Concerns none    Transportation Concerns none       Transition Planning    Patient/Family Anticipates Transition to home with family    Patient/Family Anticipated Services at Transition none    Transportation Anticipated family or friend will provide       Discharge Needs Assessment    Readmission Within the Last 30 Days no previous admission in last 30 days    Equipment Currently Used at Home walker, standard    Anticipated Changes Related to Illness none    Equipment Needed After Discharge none    Provided Post Acute Provider List? Yes    Post Acute Provider List Home Health    Provided Post Acute Provider Quality & Resource List? Yes    Post Acute Provider Quality and Resource List Home Health    Delivered To Patient    Method of Delivery In person                   Discharge Plan       Row Name 11/28/23 1447       Plan    Plan Home with pending HH    Patient/Family in Agreement with Plan yes    Plan Comments CCP met with patient at bedside. Introduced self and explained role of CCP. Patient confirmed the information on her face sheet is accurate. Patient uses [email or web address] pharmacy. Patients PCP is Matt Rose. Patient lives at home with alone. Daughter and son are very involved and assist the patient when needed. Discussed recommendation of HH with the patient. She is  agreeable and requested [facility] referral. CCP made the referral. Family can transport. CCP to follow.                   Continued Care and Services - Admitted Since 11/26/2023       Home Medical Care       Service Provider Request Status Selected Services Address Phone Fax Patient Preferred     [facility] Pending - Request Sent N/A [address] [address] [phone] --                  Expected Discharge Date and Time       Expected Discharge Date Expected Discharge Time    Nov 29, 2023            Demographic Summary       Row Name 11/28/23 1446       General Information    Admission Type inpatient    Arrived From emergency department    Referral Source admission list    Reason for Consult discharge planning    Preferred Language English                   Functional Status       Row Name 11/28/23 1446       Functional Status    Usual Activity Tolerance good    Current Activity Tolerance moderate       Functional Status, IADL    Medications independent    Meal Preparation independent    Housekeeping independent    Laundry independent    Shopping independent       Mental Status    General Appearance WDL WDL       Mental Status Summary    Recent Changes in Mental Status/Cognitive Functioning no changes       Employment/    Employment Status retired                   Psychosocial    No documentation.                  Abuse/Neglect    No documentation.                  Legal    No documentation.                  Substance Abuse    No documentation.                  Patient Forms    No documentation.

## 2023-11-28 NOTE — PROGRESS NOTES
LOS: 1 day   Patient Care Team:  Matt Rose MD as PCP - General (Family Medicine)  Marcie Robbins MD as Consulting Physician (Cardiology)  Nilesh Danielle MD as Consulting Physician (Urology)  Lina Morris TG as Pharmacist  Lev Mckeon PharmD as Pharmacist (Pharmacy)  Rogelio Tucker MD as Consulting Physician (Pulmonary Disease)    Chief Complaint:     F/u chf    Interval History:     Breathing is better but has a cough.  She denies chest pain.  Occasionally she has short runs of tachycardia.  She has had no dizziness or syncope.    Echo 10/6/23  Interpretation Summary         Left ventricular systolic function is normal. Calculated left ventricular EF = 56%    Normal right ventricular cavity size and systolic function noted.    The left atrial cavity is severely dilated.    The right atrial cavity is severely dilated.    Mild to moderate mitral valve regurgitation is present.    Moderate tricuspid valve regurgitation is present.    Calculated right ventricular systolic pressure from tricuspid regurgitation is 49 mmHg.    There is a small (<1cm) circumferential pericardial effusion. There is no evidence of cardiac tamponade.    Objective   Vital Signs  Temp:  [97.7 °F (36.5 °C)-98.4 °F (36.9 °C)] 97.7 °F (36.5 °C)  Heart Rate:  [] 79  Resp:  [16-20] 20  BP: (105-130)/(58-80) 107/60  No intake or output data in the 24 hours ending 11/28/23 0825    Comfortable NAD  Neck supple, no JVD or thyromegaly appreciated  S1/S2 irregular no m/r/g  Lungs fine expiratory rales throughout normal effort  Abdomen S/NT/ND (+) BS, no HSM appreciated  Extremities warm, no clubbing, cyanosis, or edema  No visible or palpable skin lesions  A/Ox4, mood and affect appropriate    Results Review:      Results from last 7 days   Lab Units 11/28/23  0238 11/27/23  0404 11/26/23  1851   SODIUM mmol/L 138 141 142   POTASSIUM mmol/L 3.7 3.6 3.0*   CHLORIDE mmol/L 100 102 101   CO2 mmol/L 28.5 24.7 27.0   BUN mg/dL 15 12  14   CREATININE mg/dL 1.13* 0.92 0.99   GLUCOSE mg/dL 98 116* 116*   CALCIUM mg/dL 9.5 9.2 9.4     Results from last 7 days   Lab Units 11/26/23  1851   HSTROP T ng/L 25*     Results from last 7 days   Lab Units 11/28/23  0238 11/27/23  0404 11/26/23  1851   WBC 10*3/mm3 8.48 10.00 13.29*   HEMOGLOBIN g/dL 10.4* 10.4* 11.2*   HEMATOCRIT % 32.1* 31.8* 33.6*   PLATELETS 10*3/mm3 291 277 281             Results from last 7 days   Lab Units 11/26/23  1851   MAGNESIUM mg/dL 2.2           I reviewed the patient's new clinical results.  I personally viewed and interpreted the patient's EKG/Telemetry data        Medication Review:   apixaban, 2.5 mg, Oral, Q12H  atorvastatin, 40 mg, Oral, Nightly  azithromycin, 250 mg, Oral, Daily  budesonide-formoterol, 2 puff, Inhalation, BID - RT  [START ON 12/1/2023] citalopram, 20 mg, Oral, Daily  guaiFENesin, 600 mg, Oral, BID  ipratropium-albuterol, 3 mL, Nebulization, BID  metoprolol tartrate, 100 mg, Oral, BID  potassium chloride, 20 mEq, Oral, Daily  senna-docusate sodium, 2 tablet, Oral, BID  sodium chloride, 10 mL, Intravenous, Q12H  sodium chloride, 4 mL, Nebulization, BID - RT             Assessment & Plan       Acute on chronic diastolic congestive heart failure    Primary hypertension    Chronic coronary artery disease    Chronic respiratory failure with hypoxia    Iron deficiency anemia    Permanent atrial fibrillation    COPD (chronic obstructive pulmonary disease)    Acute on chronic diastolic congestive heart failure - she responded well to IV diuretics yesterday. I will give her another 40 mg of IV Lasix today. Her torsemide is being held.   Shortness of breath - improving, still some shortness of breath with exertion.   Hypokalemia - this has resolved after replacement. Continue to monitor given her diuresis.   Non-rheumatic mitral valve regurgitation - mild on last ECHO (10/6/23)   Hypertension - at goal   Permanent atrial fibrillation - heart rate controlled on  metoprolol.  Continue Eliquis 2.5 mg BID.   Chronic coronary artery disease - diagnosed by cardiac catheterization in 2007. She had a normal nuclear stress test in 2018.   Hyperlipidemia, on atorvastatin.   Bronchiectasis with MAC and aspergillus - follows with Dr. Tucker as an outpatient. Pulmonology is following.   Anemia    Creatinine is increasing.    Start spironolactone 12.5 mg daily for HFpEF.  Consider SGLT2 as well as Entresto in the future.  Use carvedilol instead of metoprolol tartrate given her heart failure.  Use po lasix.    Could go home soon.     Still has cough but may be due to bronchiectasis.     Liyah Cheng MD  11/28/23  08:25 EST

## 2023-11-28 NOTE — CONSULTS
Patient Name:  Agnieszka Rizzo  YOB: 1930  MRN:  [id number]  Date of Admission:  11/26/2023  Date of Consult:  11/27/2023  Patient Care Team:  Matt Rose MD as PCP - General (Family Medicine)  Marcie Robbins MD as Consulting Physician (Cardiology)  Nilesh Danielle MD as Consulting Physician (Urology)  Lina Morris RPH as Pharmacist  Lev Mckeon PharmD as Pharmacist (Pharmacy)  Rogelio Tucker MD as Consulting Physician (Pulmonary Disease)    Inpatient Hospitalist Consult  Consult performed by: Hayden James APRN  Consult ordered by: Sammi Alvarado APRN  Reason for consult: Inpatient admission for CHF        Evaluate status and inpatient admission for congestive heart failure.  Subjective   History of Present Illness  Ms. Rizzo is a 89 y.o. female with a past medical history of chronic diastolic CHF, CAD, COPD, MARY, BOOP, chronic respiratory failure, permanent atrial fibrillation on Eliquis, and primary hypertension that originally presented to the emergency department secondary to dyspnea.  Chest x-ray showed cardiomegaly with suspected vascular congestion.  Her proBNP was elevated at 3145.  She was admitted to the observation unit for further diuresis.  She has been followed by cardiology and pulmonology.  We were asked to see the patient in consultation for inpatient admission.  At the time of my visit, she denies any dyspnea, chest pain, nausea, vomiting, or diarrhea.    Past Medical History:   Diagnosis Date    Aspergillus     Asthma     Atrial fibrillation     chronic    Atrial flutter     Bronchiectasis     C. difficile diarrhea 03/11/2017    CAD (coronary artery disease)     nonobstructive    Chronic diastolic CHF (congestive heart failure)     Colitis     Cough     Cryoglobulinemia     Dyspnea on exertion     Fall     Hyperlipidemia     Hypertension     Hyponatremia     Hypoxia     Infectious viral hepatitis     AGE 13    Left shoulder pain     Leg swelling     Lesion  of lung     Malignant hyperthermia due to anesthesia     Mild tricuspid regurgitation     MR (mitral regurgitation)     mild    MVP (mitral valve prolapse)     Permanent atrial fibrillation     Pneumonia with the fungal infection aspergillosis 01/06/2017    Pneumothorax     SOB (shortness of breath)     UTI (urinary tract infection)     Wheeze     mild     Past Surgical History:   Procedure Laterality Date    BRONCHOSCOPY N/A 11/12/2016    Procedure: BRONCHOSCOPY WITH FLUORO, BRUSHINGS, BAL, AND BIOPSIES;  Surgeon: Rogelio Tucker MD;  Location: [facility] ENDOSCOPY;  Service:     BRONCHOSCOPY Bilateral 06/03/2017    Procedure: BRONCHOSCOPY with BAL ;  Surgeon: Sung King MD;  Location: [facility] ENDOSCOPY;  Service:     BRONCHOSCOPY N/A 12/17/2019    Procedure: BRONCHOSCOPY WITH WASHINGS;  Surgeon: Rogelio Tucker MD;  Location: [facility] ENDOSCOPY;  Service: Pulmonary    BRONCHOSCOPY N/A 07/15/2022    Procedure: BRONCHOSCOPY;  Surgeon: Rogelio Tucker MD;  Location: [facility] ENDOSCOPY;  Service: Pulmonary;  Laterality: N/A;  Pre: Pneumonia  Post: Pneumonia    BRONCHOSCOPY N/A 10/2/2023    Procedure: BRONCHOSCOPY WITH BRONCHIAL AVEOLAR LAVAGE;  Surgeon: Rogelio Tucker MD;  Location: [facility] ENDOSCOPY;  Service: Pulmonary;  Laterality: N/A;  PRE:BRONCHIECTASIS /   POST: SAME    CATARACT EXTRACTION EXTRACAPSULAR W/ INTRAOCULAR LENS IMPLANTATION      COLONOSCOPY      2013    D & C WITH SUCTION      HYSTERECTOMY      KNEE ARTHROSCOPY Left     Partial     Family History   Problem Relation Age of Onset    Hypertension Mother     Stroke Mother     Heart disease Father     Hypertension Father     Cancer Brother     Cancer Son      Social History     Tobacco Use    Smoking status: Never    Smokeless tobacco: Never    Tobacco comments:     caffiene daily   Vaping Use    Vaping Use: Never used   Substance Use Topics    Alcohol use: Not Currently     Alcohol/week: 2.0 standard drinks of alcohol     Types: 1 Glasses of wine, 1 Shots of liquor per  week    Drug use: No     Medications Prior to Admission   Medication Sig Dispense Refill Last Dose    acetaminophen (TYLENOL) 325 MG tablet Take 2 tablets by mouth Every 4 (Four) Hours As Needed for Moderate Pain.   Past Month    albuterol sulfate  (90 Base) MCG/ACT inhaler Inhale 2 puffs Every 6 (Six) Hours As Needed for Wheezing or Shortness of Air. 8 g 11 Past Week    apixaban (ELIQUIS) 2.5 MG tablet tablet Take 1 tablet by mouth Every 12 (Twelve) Hours. Indications: Other - full anticoagulation 180 tablet 3 11/26/2023    azithromycin (ZITHROMAX) 250 MG tablet Take 1 tablet by mouth Daily.   11/26/2023    benzonatate (TESSALON) 200 MG capsule Take 1 capsule by mouth 3 (Three) Times a Day As Needed. Indications: Cough   11/26/2023    cholecalciferol (VITAMIN D3) 25 MCG (1000 UT) tablet Take 1 tablet by mouth Daily.   11/26/2023    [START ON 12/1/2023] citalopram (CeleXA) 20 MG tablet Take 1 tablet by mouth Daily.   11/26/2023    FeroSul 325 (65 Fe) MG tablet TAKE ONE TABLET BY MOUTH DAILY WITH BREAKFAST (Patient taking differently: Take 1 tablet by mouth Daily.) 90 tablet 0 11/26/2023    fexofenadine (ALLEGRA) 180 MG tablet Take 1 tablet by mouth Daily.   11/26/2023    fluticasone (FLONASE) 50 MCG/ACT nasal spray 2 sprays into the nostril(s) as directed by provider At Night As Needed for Allergies.   Past Month    fluticasone-salmeterol (ADVAIR HFA) 115-21 MCG/ACT inhaler Inhale 2 puffs 2 (Two) Times a Day.   Past Month    guaiFENesin (MUCINEX) 600 MG 12 hr tablet Take 1 tablet by mouth 2 (Two) Times a Day.   11/26/2023    ipratropium-albuterol (DUO-NEB) 0.5-2.5 mg/3 ml nebulizer Take 3 mL by nebulization 2 (Two) Times a Day.   11/26/2023    metoprolol tartrate (LOPRESSOR) 100 MG tablet Take 1 tablet by mouth 2 (Two) Times a Day. 180 tablet 1 11/26/2023    Multiple Vitamins-Minerals (PRESERVISION AREDS PO) Take 1 tablet by mouth 2 (Two) Times a Day.   11/26/2023    potassium chloride (K-DUR,KLOR-CON) 20 MEQ  CR tablet Take 1 tablet by mouth Daily. 90 tablet 3 11/26/2023    torsemide (DEMADEX) 20 MG tablet Take 2 tablets by mouth 2 (Two) Times a Day for 360 days. 360 tablet 3 11/26/2023    Lactobacillus (FLORAJEN ACIDOPHILUS) capsule Take 1 tablet by mouth Daily.       sodium chloride 0.9 % nebulizer solution Take 3 mL by nebulization 2 (Two) Times a Day.        Allergies:  Amlodipine besylate, Aspirin, Bactrim [sulfamethoxazole-trimethoprim], Erythromycin, Levaquin [levofloxacin], Nitrofurantoin, and Ramipril    Review of Systems   Constitutional:  Negative for chills and fever.   HENT:  Negative for congestion and rhinorrhea.    Eyes:  Negative for photophobia and visual disturbance.   Respiratory:  Positive for shortness of breath. Negative for cough.    Cardiovascular:  Negative for chest pain and palpitations.   Gastrointestinal:  Negative for constipation, diarrhea, nausea and vomiting.   Endocrine: Negative for cold intolerance and heat intolerance.   Genitourinary:  Negative for difficulty urinating and dysuria.   Musculoskeletal:  Negative for gait problem and joint swelling.   Skin:  Negative for rash and wound.   Neurological:  Negative for dizziness, light-headedness and headaches.   Psychiatric/Behavioral:  Negative for sleep disturbance and suicidal ideas.        Objective      Vital Signs  Temp:  [97.8 °F (36.6 °C)-98.4 °F (36.9 °C)] 98.4 °F (36.9 °C)  Heart Rate:  [] 91  Resp:  [16-20] 16  BP: (107-137)/(58-88) 108/76  Body mass index is 23.41 kg/m².    Physical Exam  Vitals and nursing note reviewed.   Constitutional:       General: She is not in acute distress.     Appearance: She is well-developed. She is not toxic-appearing.   HENT:      Head: Normocephalic and atraumatic.   Eyes:      General: No scleral icterus.        Right eye: No discharge.         Left eye: No discharge.      Conjunctiva/sclera: Conjunctivae normal.   Neck:      Vascular: No JVD.   Cardiovascular:      Rate and Rhythm:  Normal rate. Rhythm irregularly irregular.      Heart sounds: Normal heart sounds. No murmur heard.     No friction rub. No gallop.   Pulmonary:      Effort: Pulmonary effort is normal. No respiratory distress.      Breath sounds: Examination of the right-upper field reveals rales. Examination of the right-middle field reveals rales. Examination of the right-lower field reveals rales. Rales present. No wheezing.   Abdominal:      General: Bowel sounds are normal. There is no distension.      Palpations: Abdomen is soft.      Tenderness: There is no abdominal tenderness. There is no guarding.   Musculoskeletal:         General: No tenderness or deformity. Normal range of motion.      Cervical back: Normal range of motion and neck supple.   Skin:     General: Skin is warm and dry.      Capillary Refill: Capillary refill takes less than 2 seconds.   Neurological:      Mental Status: She is alert and oriented to person, place, and time.   Psychiatric:         Behavior: Behavior normal.         Results Review:   I reviewed the patient's new clinical results.  I reviewed the patient's new imaging results and agree with the interpretation.  I reviewed the patient's other test results and agree with the interpretation  I personally viewed and interpreted the patient's EKG/Telemetry data         Assessment/Plan     Active Hospital Problems    Diagnosis  POA    **Acute on chronic diastolic congestive heart failure [I50.33]  Yes    COPD (chronic obstructive pulmonary disease) [J44.9]  Yes    Permanent atrial fibrillation [I48.21]  Yes    Iron deficiency anemia [D50.9]  Yes    Chronic respiratory failure with hypoxia [J96.11]  Yes    Primary hypertension [I10]  Yes    Chronic coronary artery disease [I25.10]  Yes       Ms. Rizzo is a 89 y.o. female with a history of chronic diastolic CHF who presents with dyspnea.    Acute on chronic congestive heart failure  CAD  -Followed by cardiology.  She has responded well to IV  diuretics yesterday and today.  Her torsemide is currently being held.  Will defer diuretic management to cards  -Daily weights  -Strict I's and O    Permanent atrial fibrillation  -Rate is currently controlled.  Currently on Lopressor  -AC: Eliquis    Recent treatment for Boop/underlying bronchiectasis  Chronic respiratory failure  COPD  -Currently being followed by pulmonology as she was recently admitted in October for Boop.  Currently receiving azithromycin 250 x 4 doses.  Also has been on a long steroid taper.  -Plans for outpatient CT chest in 2 weeks per pulmonology.    Primary hypertension  -Stable    Thank you very much for asking [facility] to be involved in this patient's care. We will follow along with you.      PJ Ramires  [facility]  11/27/23  21:56 EST

## 2023-11-29 LAB
ANION GAP SERPL CALCULATED.3IONS-SCNC: 12.1 MMOL/L (ref 5–15)
BASOPHILS # BLD AUTO: 0.06 10*3/MM3 (ref 0–0.2)
BASOPHILS NFR BLD AUTO: 0.7 % (ref 0–1.5)
BUN SERPL-MCNC: 20 MG/DL (ref 8–23)
BUN/CREAT SERPL: 20.8 (ref 7–25)
CALCIUM SPEC-SCNC: 9.2 MG/DL (ref 8.2–9.6)
CHLORIDE SERPL-SCNC: 101 MMOL/L (ref 98–107)
CO2 SERPL-SCNC: 24.9 MMOL/L (ref 22–29)
CREAT SERPL-MCNC: 0.96 MG/DL (ref 0.57–1)
DEPRECATED RDW RBC AUTO: 51.1 FL (ref 37–54)
EGFRCR SERPLBLD CKD-EPI 2021: 55.3 ML/MIN/1.73
EOSINOPHIL # BLD AUTO: 0.27 10*3/MM3 (ref 0–0.4)
EOSINOPHIL NFR BLD AUTO: 3 % (ref 0.3–6.2)
ERYTHROCYTE [DISTWIDTH] IN BLOOD BY AUTOMATED COUNT: 14.2 % (ref 12.3–15.4)
GLUCOSE SERPL-MCNC: 104 MG/DL (ref 65–99)
HCT VFR BLD AUTO: 32.7 % (ref 34–46.6)
HGB BLD-MCNC: 10.7 G/DL (ref 12–15.9)
IMM GRANULOCYTES # BLD AUTO: 0.09 10*3/MM3 (ref 0–0.05)
IMM GRANULOCYTES NFR BLD AUTO: 1 % (ref 0–0.5)
LYMPHOCYTES # BLD AUTO: 2.82 10*3/MM3 (ref 0.7–3.1)
LYMPHOCYTES NFR BLD AUTO: 31 % (ref 19.6–45.3)
MCH RBC QN AUTO: 32 PG (ref 26.6–33)
MCHC RBC AUTO-ENTMCNC: 32.7 G/DL (ref 31.5–35.7)
MCV RBC AUTO: 97.9 FL (ref 79–97)
MONOCYTES # BLD AUTO: 1.02 10*3/MM3 (ref 0.1–0.9)
MONOCYTES NFR BLD AUTO: 11.2 % (ref 5–12)
NEUTROPHILS NFR BLD AUTO: 4.84 10*3/MM3 (ref 1.7–7)
NEUTROPHILS NFR BLD AUTO: 53.1 % (ref 42.7–76)
NRBC BLD AUTO-RTO: 0.1 /100 WBC (ref 0–0.2)
PLATELET # BLD AUTO: 268 10*3/MM3 (ref 140–450)
PMV BLD AUTO: 9.9 FL (ref 6–12)
POTASSIUM SERPL-SCNC: 3.7 MMOL/L (ref 3.5–5.2)
RBC # BLD AUTO: 3.34 10*6/MM3 (ref 3.77–5.28)
SODIUM SERPL-SCNC: 138 MMOL/L (ref 136–145)
WBC NRBC COR # BLD AUTO: 9.1 10*3/MM3 (ref 3.4–10.8)

## 2023-11-29 PROCEDURE — 94799 UNLISTED PULMONARY SVC/PX: CPT

## 2023-11-29 PROCEDURE — 85025 COMPLETE CBC W/AUTO DIFF WBC: CPT | Performed by: HOSPITALIST

## 2023-11-29 PROCEDURE — 80048 BASIC METABOLIC PNL TOTAL CA: CPT | Performed by: HOSPITALIST

## 2023-11-29 PROCEDURE — 94760 N-INVAS EAR/PLS OXIMETRY 1: CPT

## 2023-11-29 PROCEDURE — 94761 N-INVAS EAR/PLS OXIMETRY MLT: CPT

## 2023-11-29 PROCEDURE — 25010000002 DIGOXIN PER 500 MCG: Performed by: INTERNAL MEDICINE

## 2023-11-29 PROCEDURE — 99232 SBSQ HOSP IP/OBS MODERATE 35: CPT | Performed by: INTERNAL MEDICINE

## 2023-11-29 PROCEDURE — 25010000002 FUROSEMIDE PER 20 MG: Performed by: INTERNAL MEDICINE

## 2023-11-29 RX ORDER — DIGOXIN 125 MCG
125 TABLET ORAL
Status: DISCONTINUED | OUTPATIENT
Start: 2023-11-30 | End: 2023-12-02 | Stop reason: HOSPADM

## 2023-11-29 RX ORDER — TORSEMIDE 20 MG/1
40 TABLET ORAL
Status: DISCONTINUED | OUTPATIENT
Start: 2023-11-29 | End: 2023-11-29

## 2023-11-29 RX ORDER — CARVEDILOL 3.12 MG/1
3.12 TABLET ORAL 2 TIMES DAILY WITH MEALS
Status: DISCONTINUED | OUTPATIENT
Start: 2023-11-29 | End: 2023-12-02 | Stop reason: HOSPADM

## 2023-11-29 RX ORDER — DIGOXIN 0.25 MG/ML
250 INJECTION INTRAMUSCULAR; INTRAVENOUS ONCE
Status: COMPLETED | OUTPATIENT
Start: 2023-11-29 | End: 2023-11-29

## 2023-11-29 RX ORDER — FUROSEMIDE 10 MG/ML
40 INJECTION INTRAMUSCULAR; INTRAVENOUS EVERY 12 HOURS
Status: COMPLETED | OUTPATIENT
Start: 2023-11-29 | End: 2023-11-30

## 2023-11-29 RX ADMIN — ATORVASTATIN CALCIUM 40 MG: 20 TABLET, FILM COATED ORAL at 21:59

## 2023-11-29 RX ADMIN — IPRATROPIUM BROMIDE AND ALBUTEROL SULFATE 3 ML: 2.5; .5 SOLUTION RESPIRATORY (INHALATION) at 20:39

## 2023-11-29 RX ADMIN — FUROSEMIDE 40 MG: 10 INJECTION, SOLUTION INTRAMUSCULAR; INTRAVENOUS at 16:34

## 2023-11-29 RX ADMIN — Medication 10 ML: at 22:00

## 2023-11-29 RX ADMIN — POTASSIUM CHLORIDE 10 MEQ: 750 TABLET, EXTENDED RELEASE ORAL at 08:18

## 2023-11-29 RX ADMIN — APIXABAN 2.5 MG: 2.5 TABLET, FILM COATED ORAL at 08:13

## 2023-11-29 RX ADMIN — Medication 10 ML: at 08:14

## 2023-11-29 RX ADMIN — GUAIFENESIN 600 MG: 600 TABLET, EXTENDED RELEASE ORAL at 08:13

## 2023-11-29 RX ADMIN — CARVEDILOL 6.25 MG: 6.25 TABLET, FILM COATED ORAL at 08:13

## 2023-11-29 RX ADMIN — GUAIFENESIN 600 MG: 600 TABLET, EXTENDED RELEASE ORAL at 21:59

## 2023-11-29 RX ADMIN — Medication 12.5 MG: at 08:13

## 2023-11-29 RX ADMIN — BENZONATATE 200 MG: 100 CAPSULE ORAL at 22:00

## 2023-11-29 RX ADMIN — Medication 4 ML: at 20:43

## 2023-11-29 RX ADMIN — Medication 4 ML: at 07:34

## 2023-11-29 RX ADMIN — CARVEDILOL 3.12 MG: 3.12 TABLET, FILM COATED ORAL at 21:59

## 2023-11-29 RX ADMIN — BUDESONIDE AND FORMOTEROL FUMARATE DIHYDRATE 2 PUFF: 160; 4.5 AEROSOL RESPIRATORY (INHALATION) at 20:47

## 2023-11-29 RX ADMIN — AZITHROMYCIN 250 MG: 250 TABLET, FILM COATED ORAL at 08:13

## 2023-11-29 RX ADMIN — IPRATROPIUM BROMIDE AND ALBUTEROL SULFATE 3 ML: 2.5; .5 SOLUTION RESPIRATORY (INHALATION) at 07:34

## 2023-11-29 RX ADMIN — APIXABAN 2.5 MG: 2.5 TABLET, FILM COATED ORAL at 22:00

## 2023-11-29 RX ADMIN — FUROSEMIDE 40 MG: 40 TABLET ORAL at 08:13

## 2023-11-29 RX ADMIN — DIGOXIN 250 MCG: 0.25 INJECTION INTRAMUSCULAR; INTRAVENOUS at 16:33

## 2023-11-29 RX ADMIN — BUDESONIDE AND FORMOTEROL FUMARATE DIHYDRATE 2 PUFF: 160; 4.5 AEROSOL RESPIRATORY (INHALATION) at 07:34

## 2023-11-29 NOTE — PROGRESS NOTES
Name: Agnieszka Rizzo ADMIT: 2023   : 1930  PCP: Matt Rose MD    MRN: [id number] LOS: 2 days   AGE/SEX: 89 y.o. female  ROOM: Replaced by [facility]     Subjective   Subjective   No acute events. Patient has dyspnea with exertion. Denies chest pain.   No family at bedside.    Objective   Objective   Vital Signs  Temp:  [97.3 °F (36.3 °C)-98.4 °F (36.9 °C)] 98.1 °F (36.7 °C)  Heart Rate:  [] 112  Resp:  [16-18] 16  BP: ()/(63-75) 136/75  SpO2:  [92 %-100 %] 93 %  on   ;   Device (Oxygen Therapy): room air  Body mass index is 23.81 kg/m².  Physical Exam  Vitals and nursing note reviewed.   Constitutional:       General: She is not in acute distress.     Appearance: She is ill-appearing (chronically). She is not toxic-appearing or diaphoretic.   HENT:      Head: Normocephalic and atraumatic.      Nose: Nose normal.      Mouth/Throat:      Mouth: Mucous membranes are moist.      Pharynx: Oropharynx is clear.   Eyes:      Conjunctiva/sclera: Conjunctivae normal.      Pupils: Pupils are equal, round, and reactive to light.   Cardiovascular:      Rate and Rhythm: Tachycardia present. Rhythm irregular.      Pulses: Normal pulses.   Pulmonary:      Effort: Pulmonary effort is normal.      Breath sounds: Rales (bibasilar) present.   Abdominal:      General: Bowel sounds are normal.      Palpations: Abdomen is soft.   Musculoskeletal:         General: No swelling or tenderness.      Cervical back: Neck supple.   Skin:     General: Skin is warm and dry.      Capillary Refill: Capillary refill takes less than 2 seconds.   Neurological:      General: No focal deficit present.      Mental Status: She is alert.   Psychiatric:         Mood and Affect: Mood normal.         Behavior: Behavior normal.       Results Review     I reviewed the patient's new clinical results.  Results from last 7 days   Lab Units 23  0307 23  0238 23  0404 23  1851   WBC 10*3/mm3 9.10 8.48 10.00 13.29*    HEMOGLOBIN g/dL 10.7* 10.4* 10.4* 11.2*   PLATELETS 10*3/mm3 268 291 277 281     Results from last 7 days   Lab Units 11/29/23 0307 11/28/23 0238 11/27/23 0404 11/26/23  1851   SODIUM mmol/L 138 138 141 142   POTASSIUM mmol/L 3.7 3.7 3.6 3.0*   CHLORIDE mmol/L 101 100 102 101   CO2 mmol/L 24.9 28.5 24.7 27.0   BUN mg/dL 20 15 12 14   CREATININE mg/dL 0.96 1.13* 0.92 0.99   GLUCOSE mg/dL 104* 98 116* 116*   EGFR mL/min/1.73 55.3* 45.5* 58.2* 53.3*     Results from last 7 days   Lab Units 11/26/23  1851   ALBUMIN g/dL 3.9   BILIRUBIN mg/dL 0.3   ALK PHOS U/L 91   AST (SGOT) U/L 22   ALT (SGPT) U/L 12     Results from last 7 days   Lab Units 11/29/23 0307 11/28/23 0238 11/27/23 0404 11/26/23  1851   CALCIUM mg/dL 9.2 9.5 9.2 9.4   ALBUMIN g/dL  --   --   --  3.9   MAGNESIUM mg/dL  --   --   --  2.2     Results from last 7 days   Lab Units 11/27/23 0404 11/26/23  1851   PROCALCITONIN ng/mL 0.05 0.05     Hemoglobin A1C   Date/Time Value Ref Range Status   11/28/2023 0238 6.10 (H) 4.80 - 5.60 % Final       No radiology results for the last day    I have personally reviewed all medications:  Scheduled Medications  apixaban, 2.5 mg, Oral, Q12H  atorvastatin, 40 mg, Oral, Nightly  azithromycin, 250 mg, Oral, Daily  budesonide-formoterol, 2 puff, Inhalation, BID - RT  carvedilol, 3.125 mg, Oral, BID With Meals  [START ON 12/1/2023] citalopram, 20 mg, Oral, Daily  [START ON 11/30/2023] digoxin, 125 mcg, Oral, Daily  furosemide, 40 mg, Intravenous, Q12H  guaiFENesin, 600 mg, Oral, BID  ipratropium-albuterol, 3 mL, Nebulization, BID  potassium chloride, 10 mEq, Oral, Daily  senna-docusate sodium, 2 tablet, Oral, BID  sodium chloride, 10 mL, Intravenous, Q12H  sodium chloride, 4 mL, Nebulization, BID - RT  spironolactone, 12.5 mg, Oral, Daily    Infusions   Diet  Diet: Cardiac Diets; Healthy Heart (2-3 Na+); Texture: Regular Texture (IDDSI 7); Fluid Consistency: Thin (IDDSI 0)    I have personally reviewed:  [x]   Laboratory   []  Microbiology   [x]  Radiology   [x]  EKG/Telemetry  []  Cardiology/Vascular   []  Pathology    [x]  Records    Assessment/Plan     Active Hospital Problems    Diagnosis  POA    **Acute on chronic diastolic congestive heart failure [I50.33]  Yes    COPD (chronic obstructive pulmonary disease) [J44.9]  Yes    Permanent atrial fibrillation [I48.21]  Yes    Iron deficiency anemia [D50.9]  Yes    Chronic respiratory failure with hypoxia [J96.11]  Yes    Primary hypertension [I10]  Yes    Chronic coronary artery disease [I25.10]  Yes      Resolved Hospital Problems   No resolved problems to display.   Acute on Chronic Diastolic CHF  - remains centrally volume-overloaded  - continue on IV lasix  - monitor ins/outs, daily weights, and chemistries  - appreciate cardiology recs    Permanent Afib/RVR  - HR elevated, complicating above  - continue coreg  - getting digoxin    COPD/Chronic Hypoxic Respiratory Failure  - no exacerbation, recently treated for BOOP and has chronic KINA complicating  - continue on current regimen  - appreciate pulmonology recs        Eliquis (home med) for DVT prophylaxis.  Full code.  Discussed with patient and nursing staff.  Anticipate discharge home in 1-2 days.      Karel Porras MD  [facility]  11/29/23  18:17 EST

## 2023-11-29 NOTE — PROGRESS NOTES
LOS: 2 days   Patient Care Team:  Matt Rose MD as PCP - General (Family Medicine)  Marcie Robbins MD as Consulting Physician (Cardiology)  Nilesh Danielle MD as Consulting Physician (Urology)  Lina Morris [facility] as Pharmacist  Lev Mckeon, PharmD as Pharmacist (Pharmacy)  Rogelio Tucker MD as Consulting Physician (Pulmonary Disease)    Chief Complaint:     F/u chf    Interval History:     Has Dyspnea - no orthopnea. Not urinating.  No dizziness or weakness.     Objective   Vital Signs  Temp:  [97.3 °F (36.3 °C)-99.1 °F (37.3 °C)] 98 °F (36.7 °C)  Heart Rate:  [] 84  Resp:  [16-18] 16  BP: (105-131)/(63-80) 116/72    Intake/Output Summary (Last 24 hours) at 11/29/2023 0923  Last data filed at 11/29/2023 0822  Gross per 24 hour   Intake 780 ml   Output 400 ml   Net 380 ml       Comfortable NAD  Neck supple, no JVD or thyromegaly appreciated  S1/S2 irregular with mild tachy, no m/r/g  Lungs CTA B with fine rales at bases, normal effort  Abdomen S/NT/ND (+) BS, no HSM appreciated  Extremities warm, no clubbing, cyanosis, or edema  No visible or palpable skin lesions  A/Ox4, mood and affect appropriate    Results Review:      Results from last 7 days   Lab Units 11/29/23  0307 11/28/23  0238 11/27/23  0404   SODIUM mmol/L 138 138 141   POTASSIUM mmol/L 3.7 3.7 3.6   CHLORIDE mmol/L 101 100 102   CO2 mmol/L 24.9 28.5 24.7   BUN mg/dL 20 15 12   CREATININE mg/dL 0.96 1.13* 0.92   GLUCOSE mg/dL 104* 98 116*   CALCIUM mg/dL 9.2 9.5 9.2     Results from last 7 days   Lab Units 11/26/23  1851   HSTROP T ng/L 25*     Results from last 7 days   Lab Units 11/29/23  0307 11/28/23  0238 11/27/23  0404   WBC 10*3/mm3 9.10 8.48 10.00   HEMOGLOBIN g/dL 10.7* 10.4* 10.4*   HEMATOCRIT % 32.7* 32.1* 31.8*   PLATELETS 10*3/mm3 268 291 277             Results from last 7 days   Lab Units 11/26/23  1851   MAGNESIUM mg/dL 2.2           I reviewed the patient's new clinical results.  I personally viewed and interpreted  the patient's EKG/Telemetry data        Medication Review:   apixaban, 2.5 mg, Oral, Q12H  atorvastatin, 40 mg, Oral, Nightly  azithromycin, 250 mg, Oral, Daily  budesonide-formoterol, 2 puff, Inhalation, BID - RT  carvedilol, 6.25 mg, Oral, BID With Meals  [START ON 12/1/2023] citalopram, 20 mg, Oral, Daily  furosemide, 40 mg, Oral, Daily  guaiFENesin, 600 mg, Oral, BID  ipratropium-albuterol, 3 mL, Nebulization, BID  potassium chloride, 10 mEq, Oral, Daily  senna-docusate sodium, 2 tablet, Oral, BID  sodium chloride, 10 mL, Intravenous, Q12H  sodium chloride, 4 mL, Nebulization, BID - RT  spironolactone, 12.5 mg, Oral, Daily             Assessment & Plan       Acute on chronic diastolic congestive heart failure    Primary hypertension    Chronic coronary artery disease    Chronic respiratory failure with hypoxia    Iron deficiency anemia    Permanent atrial fibrillation    COPD (chronic obstructive pulmonary disease)    Acute on chronic diastolic congestive heart failure - she responded well to IV diuretics.  Restart torsemide.   Shortness of breath - improving, still some shortness of breath with exertion.   Non-rheumatic mitral valve regurgitation - mild on last ECHO (10/6/23)   Hypertension - at goal   Permanent atrial fibrillation - heart rate controlled on metoprolol.  Continue Eliquis 2.5 mg BID.   Chronic coronary artery disease - diagnosed by cardiac catheterization in 2007. She had a normal nuclear stress test in 2018.   Hyperlipidemia, on atorvastatin.   Bronchiectasis with MAC and aspergillus - follows with Dr. Tucker as an outpatient. Pulmonology is following.   Anemia - mild.     VSS stable. Iv dig and start oral digoxin - decrease coreg due to low bp.  Lasix IV due ot weight gain and dyspnea.     Lives in her own home.     Liyah Cheng MD  11/29/23  09:23 EST

## 2023-11-29 NOTE — PROGRESS NOTES
"                                              LOS: 2 days   Patient Care Team:  Matt Rose MD as PCP - General (Family Medicine)  Marcie Robbins MD as Consulting Physician (Cardiology)  Nilesh Danielle MD as Consulting Physician (Urology)  Lina Morris [facility] as Pharmacist  Lev Mkceon, PharmD as Pharmacist (Pharmacy)  Rogelio Tucker MD as Consulting Physician (Pulmonary Disease)    Chief Complaint:  F/up COPD, organizing pneumonia and medical problems as below.    Subjective   Interval History      She reported mild dyspnea on mild to moderate exertion but not at rest.  No significant cough.    REVIEW OF SYSTEMS:   CARDIOVASCULAR: No chest pain, chest pressure or chest discomfort. No palpitations or edema.     GASTROINTESTINAL: Diarrhea resolved.  CONSTITUTIONAL: No fever or chills.     Ventilator/Non-Invasive Ventilation Settings (From admission, onward)      None                  Physical Exam:     Vital Signs  Temp:  [97.3 °F (36.3 °C)-98.4 °F (36.9 °C)] 97.7 °F (36.5 °C)  Heart Rate:  [] 103  Resp:  [16-18] 16  BP: ()/(63-72) 98/64    Intake/Output Summary (Last 24 hours) at 11/29/2023 1345  Last data filed at 11/29/2023 1304  Gross per 24 hour   Intake 780 ml   Output 400 ml   Net 380 ml     Flowsheet Rows      Flowsheet Row First Filed Value   Admission Height 157.5 cm (62\") Documented at 11/26/2023 1835   Admission Weight 57.2 kg (126 lb) Documented at 11/26/2023 1835            PPE used per hospital policy    General Appearance:   Alert, cooperative, in no acute distress   ENMT:  Mallampati score 2, moist mucous membrane   Eyes:  Pupils equal and reactive to light. EOMI   Neck:   Trachea midline. No thyromegaly.   Lungs:   Minimal scattered Rales bilaterally more prominent on the left base.  No wheezing.  No labored breathing.  No change.    Heart:   Regular rhythm and normal rate, normal S1 and S2, no         murmur   Skin:   No rash or ecchymosis   Abdomen:     Soft. No tenderness. " No HSM.   Neuro/psych:  Conscious, alert, oriented x3. Strength 5/5 in upper and lower  ext.  Appropriate mood and affect   Extremities:  No cyanosis, clubbing or edema.  Warm extremities and well-perfused          Results Review:        Results from last 7 days   Lab Units 11/29/23 0307 11/28/23 0238 11/27/23  0404   SODIUM mmol/L 138 138 141   POTASSIUM mmol/L 3.7 3.7 3.6   CHLORIDE mmol/L 101 100 102   CO2 mmol/L 24.9 28.5 24.7   BUN mg/dL 20 15 12   CREATININE mg/dL 0.96 1.13* 0.92   GLUCOSE mg/dL 104* 98 116*   CALCIUM mg/dL 9.2 9.5 9.2     Results from last 7 days   Lab Units 11/26/23  1851   HSTROP T ng/L 25*     Results from last 7 days   Lab Units 11/29/23 0307 11/28/23 0238 11/27/23  0404   WBC 10*3/mm3 9.10 8.48 10.00   HEMOGLOBIN g/dL 10.7* 10.4* 10.4*   HEMATOCRIT % 32.7* 32.1* 31.8*   PLATELETS 10*3/mm3 268 291 277         Results from last 7 days   Lab Units 11/26/23  1851   PROBNP pg/mL 3,145.0*                           I reviewed the patient's new clinical results.        Medication Review:   apixaban, 2.5 mg, Oral, Q12H  atorvastatin, 40 mg, Oral, Nightly  azithromycin, 250 mg, Oral, Daily  budesonide-formoterol, 2 puff, Inhalation, BID - RT  carvedilol, 6.25 mg, Oral, BID With Meals  [START ON 12/1/2023] citalopram, 20 mg, Oral, Daily  guaiFENesin, 600 mg, Oral, BID  ipratropium-albuterol, 3 mL, Nebulization, BID  potassium chloride, 10 mEq, Oral, Daily  senna-docusate sodium, 2 tablet, Oral, BID  sodium chloride, 10 mL, Intravenous, Q12H  sodium chloride, 4 mL, Nebulization, BID - RT  spironolactone, 12.5 mg, Oral, Daily  torsemide, 40 mg, Oral, BID             Diagnostic imaging:  I personally and independently reviewed the following images:  CXR 11/26/23: Cardiomegaly and vascular congestion.    Assessment     Dyspnea, multifactorial.  Acute on chronic HFpEF  Paroxysmal atrial fibrillation  Underlying bronchiectasis  Recent treatment for BOOP  Chronic KINA  COPD, no current  exacerbation  Moderate MR  History of aspergillosis        Plan       Symbicort 2 puffs twice daily  DuoNeb twice daily  Resume home inhalers on discharge.  CHF management per cardiology: Spironolactone 12.5 mg daily and torsemide 40 mg twice daily  Anticoagulation: Eliquis 2.5 mg twice daily.    Okay to discharge from pulmonary perspective.  Will see her only as needed.  Thank you    Sung King MD  11/29/23  13:45 EST            This note was dictated utilizing [employer] dictation

## 2023-11-29 NOTE — PLAN OF CARE
Goal Outcome Evaluation:  Plan of Care Reviewed With: patient        Progress: improving  Outcome Evaluation: Pt continuing with therapy, lung with crackles, breathing treatments scheduled, Tessalon perle administered. No acute complaints overnight, will continue monitor

## 2023-11-30 ENCOUNTER — APPOINTMENT (OUTPATIENT)
Dept: GENERAL RADIOLOGY | Facility: HOSPITAL | Age: 88
DRG: 291 | End: 2023-11-30
Payer: MEDICARE

## 2023-11-30 LAB
ANION GAP SERPL CALCULATED.3IONS-SCNC: 11.1 MMOL/L (ref 5–15)
BUN SERPL-MCNC: 16 MG/DL (ref 8–23)
BUN/CREAT SERPL: 17.6 (ref 7–25)
CALCIUM SPEC-SCNC: 9.4 MG/DL (ref 8.2–9.6)
CHLORIDE SERPL-SCNC: 102 MMOL/L (ref 98–107)
CO2 SERPL-SCNC: 26.9 MMOL/L (ref 22–29)
CREAT SERPL-MCNC: 0.91 MG/DL (ref 0.57–1)
EGFRCR SERPLBLD CKD-EPI 2021: 58.9 ML/MIN/1.73
GLUCOSE SERPL-MCNC: 106 MG/DL (ref 65–99)
POTASSIUM SERPL-SCNC: 3.6 MMOL/L (ref 3.5–5.2)
SODIUM SERPL-SCNC: 140 MMOL/L (ref 136–145)

## 2023-11-30 PROCEDURE — 71046 X-RAY EXAM CHEST 2 VIEWS: CPT

## 2023-11-30 PROCEDURE — 25010000002 FUROSEMIDE PER 20 MG: Performed by: INTERNAL MEDICINE

## 2023-11-30 PROCEDURE — 94668 MNPJ CHEST WALL SBSQ: CPT

## 2023-11-30 PROCEDURE — 94799 UNLISTED PULMONARY SVC/PX: CPT

## 2023-11-30 PROCEDURE — 94664 DEMO&/EVAL PT USE INHALER: CPT

## 2023-11-30 PROCEDURE — 94761 N-INVAS EAR/PLS OXIMETRY MLT: CPT

## 2023-11-30 PROCEDURE — 99232 SBSQ HOSP IP/OBS MODERATE 35: CPT | Performed by: INTERNAL MEDICINE

## 2023-11-30 PROCEDURE — 97110 THERAPEUTIC EXERCISES: CPT

## 2023-11-30 PROCEDURE — 80048 BASIC METABOLIC PNL TOTAL CA: CPT | Performed by: INTERNAL MEDICINE

## 2023-11-30 RX ORDER — FUROSEMIDE 40 MG/1
40 TABLET ORAL
Status: DISCONTINUED | OUTPATIENT
Start: 2023-11-30 | End: 2023-11-30

## 2023-11-30 RX ORDER — SODIUM CHLORIDE FOR INHALATION 7 %
4 VIAL, NEBULIZER (ML) INHALATION
Status: DISCONTINUED | OUTPATIENT
Start: 2023-11-30 | End: 2023-12-01

## 2023-11-30 RX ORDER — FUROSEMIDE 80 MG
80 TABLET ORAL
Status: DISCONTINUED | OUTPATIENT
Start: 2023-11-30 | End: 2023-12-02 | Stop reason: HOSPADM

## 2023-11-30 RX ORDER — POTASSIUM CHLORIDE 750 MG/1
20 TABLET, FILM COATED, EXTENDED RELEASE ORAL DAILY
Status: DISCONTINUED | OUTPATIENT
Start: 2023-11-30 | End: 2023-12-02 | Stop reason: HOSPADM

## 2023-11-30 RX ADMIN — CARVEDILOL 3.12 MG: 3.12 TABLET, FILM COATED ORAL at 08:35

## 2023-11-30 RX ADMIN — FUROSEMIDE 40 MG: 10 INJECTION, SOLUTION INTRAMUSCULAR; INTRAVENOUS at 06:21

## 2023-11-30 RX ADMIN — BUDESONIDE AND FORMOTEROL FUMARATE DIHYDRATE 2 PUFF: 160; 4.5 AEROSOL RESPIRATORY (INHALATION) at 06:30

## 2023-11-30 RX ADMIN — Medication 10 ML: at 20:06

## 2023-11-30 RX ADMIN — CARVEDILOL 3.12 MG: 3.12 TABLET, FILM COATED ORAL at 18:19

## 2023-11-30 RX ADMIN — ALBUTEROL SULFATE 2.5 MG: 2.5 SOLUTION RESPIRATORY (INHALATION) at 15:27

## 2023-11-30 RX ADMIN — GUAIFENESIN 600 MG: 600 TABLET, EXTENDED RELEASE ORAL at 08:34

## 2023-11-30 RX ADMIN — FUROSEMIDE 80 MG: 80 TABLET ORAL at 19:59

## 2023-11-30 RX ADMIN — APIXABAN 2.5 MG: 2.5 TABLET, FILM COATED ORAL at 20:04

## 2023-11-30 RX ADMIN — GUAIFENESIN 600 MG: 600 TABLET, EXTENDED RELEASE ORAL at 20:04

## 2023-11-30 RX ADMIN — Medication 4 ML: at 21:22

## 2023-11-30 RX ADMIN — DIGOXIN 125 MCG: 125 TABLET ORAL at 14:57

## 2023-11-30 RX ADMIN — AZITHROMYCIN 250 MG: 250 TABLET, FILM COATED ORAL at 08:34

## 2023-11-30 RX ADMIN — DOCUSATE SODIUM 50MG AND SENNOSIDES 8.6MG 2 TABLET: 8.6; 5 TABLET, FILM COATED ORAL at 20:04

## 2023-11-30 RX ADMIN — DOCUSATE SODIUM 50MG AND SENNOSIDES 8.6MG 2 TABLET: 8.6; 5 TABLET, FILM COATED ORAL at 08:35

## 2023-11-30 RX ADMIN — Medication 12.5 MG: at 08:34

## 2023-11-30 RX ADMIN — Medication 4 ML: at 06:30

## 2023-11-30 RX ADMIN — POTASSIUM CHLORIDE 10 MEQ: 750 TABLET, EXTENDED RELEASE ORAL at 08:34

## 2023-11-30 RX ADMIN — BENZONATATE 200 MG: 100 CAPSULE ORAL at 15:11

## 2023-11-30 RX ADMIN — ATORVASTATIN CALCIUM 40 MG: 20 TABLET, FILM COATED ORAL at 20:04

## 2023-11-30 RX ADMIN — BUDESONIDE AND FORMOTEROL FUMARATE DIHYDRATE 2 PUFF: 160; 4.5 AEROSOL RESPIRATORY (INHALATION) at 21:21

## 2023-11-30 RX ADMIN — APIXABAN 2.5 MG: 2.5 TABLET, FILM COATED ORAL at 08:34

## 2023-11-30 RX ADMIN — FUROSEMIDE 80 MG: 80 TABLET ORAL at 14:57

## 2023-11-30 RX ADMIN — POTASSIUM CHLORIDE 20 MEQ: 750 TABLET, EXTENDED RELEASE ORAL at 14:57

## 2023-11-30 RX ADMIN — IPRATROPIUM BROMIDE AND ALBUTEROL SULFATE 3 ML: 2.5; .5 SOLUTION RESPIRATORY (INHALATION) at 21:21

## 2023-11-30 RX ADMIN — IPRATROPIUM BROMIDE AND ALBUTEROL SULFATE 3 ML: 2.5; .5 SOLUTION RESPIRATORY (INHALATION) at 06:29

## 2023-11-30 RX ADMIN — Medication 10 ML: at 08:35

## 2023-11-30 NOTE — PROGRESS NOTES
"                                              LOS: 3 days   Patient Care Team:  Matt Rose MD as PCP - General (Family Medicine)  Marcie Robbins MD as Consulting Physician (Cardiology)  Nilesh Danielle MD as Consulting Physician (Urology)  Lina Morris [facility] as Pharmacist  Lev Mckeon, PharmD as Pharmacist (Pharmacy)  Rogelio Tucker MD as Consulting Physician (Pulmonary Disease)    Chief Complaint:  F/up COPD, organizing pneumonia and medical problems as below.    Subjective   Interval History      Called to see due to worsening cough. Patient use a vest at home.   COugh is non productive and worse than yesterday. Associated with RAMOS.     REVIEW OF SYSTEMS:   CARDIOVASCULAR: No chest pain, chest pressure or chest discomfort. No palpitations or edema.     GASTROINTESTINAL: Diarrhea resolved.  CONSTITUTIONAL: No fever or chills.     Ventilator/Non-Invasive Ventilation Settings (From admission, onward)      None                  Physical Exam:     Vital Signs  Temp:  [97.6 °F (36.4 °C)-98.6 °F (37 °C)] 97.6 °F (36.4 °C)  Heart Rate:  [] 111  Resp:  [18-20] 18  BP: (104-136)/(57-81) 136/81    Intake/Output Summary (Last 24 hours) at 11/30/2023 1657  Last data filed at 11/30/2023 1155  Gross per 24 hour   Intake 720 ml   Output 650 ml   Net 70 ml     Flowsheet Rows      Flowsheet Row First Filed Value   Admission Height 157.5 cm (62\") Documented at 11/26/2023 1835   Admission Weight 57.2 kg (126 lb) Documented at 11/26/2023 1835            PPE used per hospital policy    General Appearance:   Alert, cooperative, in no acute distress   ENMT:  Mallampati score 2, moist mucous membrane   Eyes:  Pupils equal and reactive to light. EOMI   Neck:   Trachea midline. No thyromegaly.   Lungs:   Worsening bilateral rales and coarse breath sounds. No labored breathing. .    Heart:   Regular rhythm and normal rate, normal S1 and S2, no         murmur   Skin:   No rash or ecchymosis   Abdomen:     Soft. No " tenderness. No HSM.   Neuro/psych:  Conscious, alert, oriented x3. Strength 5/5 in upper and lower  ext.  Appropriate mood and affect   Extremities:  No cyanosis, clubbing or edema.  Warm extremities and well-perfused          Results Review:        Results from last 7 days   Lab Units 11/30/23  0946 11/29/23  0307 11/28/23  0238   SODIUM mmol/L 140 138 138   POTASSIUM mmol/L 3.6 3.7 3.7   CHLORIDE mmol/L 102 101 100   CO2 mmol/L 26.9 24.9 28.5   BUN mg/dL 16 20 15   CREATININE mg/dL 0.91 0.96 1.13*   GLUCOSE mg/dL 106* 104* 98   CALCIUM mg/dL 9.4 9.2 9.5     Results from last 7 days   Lab Units 11/26/23  1851   HSTROP T ng/L 25*     Results from last 7 days   Lab Units 11/29/23  0307 11/28/23  0238 11/27/23  0404   WBC 10*3/mm3 9.10 8.48 10.00   HEMOGLOBIN g/dL 10.7* 10.4* 10.4*   HEMATOCRIT % 32.7* 32.1* 31.8*   PLATELETS 10*3/mm3 268 291 277         Results from last 7 days   Lab Units 11/26/23  1851   PROBNP pg/mL 3,145.0*                           I reviewed the patient's new clinical results.        Medication Review:   apixaban, 2.5 mg, Oral, Q12H  atorvastatin, 40 mg, Oral, Nightly  budesonide-formoterol, 2 puff, Inhalation, BID - RT  carvedilol, 3.125 mg, Oral, BID With Meals  [START ON 12/1/2023] citalopram, 20 mg, Oral, Daily  digoxin, 125 mcg, Oral, Daily  furosemide, 80 mg, Oral, BID  guaiFENesin, 600 mg, Oral, BID  ipratropium-albuterol, 3 mL, Nebulization, BID  potassium chloride, 20 mEq, Oral, Daily  senna-docusate sodium, 2 tablet, Oral, BID  sodium chloride, 10 mL, Intravenous, Q12H  sodium chloride, 4 mL, Nebulization, BID - RT             Diagnostic imaging:  I personally and independently reviewed the following images:  CXR 11/30/23: Cardiomegaly and vascular congestion. Fluid in the right fissure. Mild interstitial infiltrates. Similar to CXR 11/26    Assessment     Dyspnea, multifactorial.   Worsening cough  Acute on chronic HFpEF  Paroxysmal atrial fibrillation  Underlying  bronchiectasis  Recent treatment for BOOP  Chronic KINA  COPD, no current exacerbation  Moderate MR  History of aspergillosis        Plan       Symbicort 2 puffs twice daily  Increase Duoneb to Q 6 h and add HS neb and CPT to improve mucous clearance  CHF management per cardiology: Lasix 80 mg BID. Agree  Anticoagulation: Eliquis 2.5 mg twice daily.  Mucinex 600 mg BID    Discussed with her daughter at bedside    Sung King MD  11/30/23  16:57 EST            This note was dictated utilizing Dragon dictation

## 2023-11-30 NOTE — PLAN OF CARE
Goal Outcome Evaluation:  Plan of Care Reviewed With: patient        Progress: improving  Outcome Evaluation: Pt cont to do well with mobility and is now walking independently with a rolling walker, 200 ft, no further indication for PT, pt is safe to walk with nursing staff and family. Pt is appropriate to return to home at discharge, will sign off      Anticipated Discharge Disposition (PT): home

## 2023-11-30 NOTE — THERAPY TREATMENT NOTE
Patient Name: Agnieszka Rizzo  : 1930    MRN: [id number]                              Today's Date: 2023       Admit Date: 2023    Visit Dx:     ICD-10-CM ICD-9-CM   1. Acute on chronic congestive heart failure, unspecified heart failure type  I50.9 428.0   2. Elevated troponin  R79.89 790.6   3. Hyperglycemia  R73.9 790.29   4. Leukocytosis, unspecified type  D72.829 288.60   5. History of bronchiectasis  Z87.09 V12.69     Patient Active Problem List   Diagnosis    Primary hypertension    Chronic coronary artery disease    Familial hypercholesterolemia    Mitral valve insufficiency    Ventricular premature beats    Ventricular tachycardia    Chronic respiratory failure with hypoxia    Acid-fast bacteria present    DNR (do not resuscitate)    Chronic diastolic CHF (congestive heart failure)    Asymptomatic bacteriuria    Iron deficiency anemia    Hypokalemia    Bronchiectasis    Pneumonia of both lungs due to infectious organism    KINA (mycobacterium avium-intracellulare)    Permanent atrial fibrillation    Anxiety    Exposure to hepatitis A    Medicare annual wellness visit, subsequent    Abdominal pain    Herpes infection    Moderate asthma with acute exacerbation    Bronchitis, acute, with bronchospasm    Abnormal EKG    Fall    Laceration of left upper extremity    Multiple skin tears    Alteration in anticoagulation    Blind right eye    Clinical diagnosis of COVID-19    Pneumonia of right lung due to infectious organism, unspecified part of lung    COPD (chronic obstructive pulmonary disease)    Dizziness    Bronchiectasis    Sepsis due to pneumonia    Acute pulmonary edema    Electrolyte imbalance    CHF (congestive heart failure)    Acute on chronic diastolic congestive heart failure     Past Medical History:   Diagnosis Date    Aspergillus     Asthma     Atrial fibrillation     chronic    Atrial flutter     Bronchiectasis     C. difficile diarrhea 2017    CAD (coronary artery  disease)     nonobstructive    Chronic diastolic CHF (congestive heart failure)     Colitis     Cough     Cryoglobulinemia     Dyspnea on exertion     Fall     Hyperlipidemia     Hypertension     Hyponatremia     Hypoxia     Infectious viral hepatitis     AGE 13    Left shoulder pain     Leg swelling     Lesion of lung     Malignant hyperthermia due to anesthesia     Mild tricuspid regurgitation     MR (mitral regurgitation)     mild    MVP (mitral valve prolapse)     Permanent atrial fibrillation     Pneumonia with the fungal infection aspergillosis 01/06/2017    Pneumothorax     SOB (shortness of breath)     UTI (urinary tract infection)     Wheeze     mild     Past Surgical History:   Procedure Laterality Date    BRONCHOSCOPY N/A 11/12/2016    Procedure: BRONCHOSCOPY WITH FLUORO, BRUSHINGS, BAL, AND BIOPSIES;  Surgeon: Rogelio Tucker MD;  Location: [facility] ENDOSCOPY;  Service:     BRONCHOSCOPY Bilateral 06/03/2017    Procedure: BRONCHOSCOPY with BAL ;  Surgeon: Sung Knig MD;  Location: [facility] ENDOSCOPY;  Service:     BRONCHOSCOPY N/A 12/17/2019    Procedure: BRONCHOSCOPY WITH WASHINGS;  Surgeon: Rogelio Tucker MD;  Location: [facility] ENDOSCOPY;  Service: Pulmonary    BRONCHOSCOPY N/A 07/15/2022    Procedure: BRONCHOSCOPY;  Surgeon: Rogelio Tucker MD;  Location: [facility] ENDOSCOPY;  Service: Pulmonary;  Laterality: N/A;  Pre: Pneumonia  Post: Pneumonia    BRONCHOSCOPY N/A 10/2/2023    Procedure: BRONCHOSCOPY WITH BRONCHIAL AVEOLAR LAVAGE;  Surgeon: Rogelio Tucker MD;  Location: [facility] ENDOSCOPY;  Service: Pulmonary;  Laterality: N/A;  PRE:BRONCHIECTASIS /   POST: SAME    CATARACT EXTRACTION EXTRACAPSULAR W/ INTRAOCULAR LENS IMPLANTATION      COLONOSCOPY      2013    D & C WITH SUCTION      HYSTERECTOMY      KNEE ARTHROSCOPY Left     Partial      General Information       Row Name 11/30/23 1114          Physical Therapy Time and Intention    Document Type therapy note (daily note)  -PC     Mode of Treatment physical therapy   -PC       Row Name 11/30/23 1114          Cognition    Orientation Status (Cognition) oriented x 4  -PC               User Key  (r) = Recorded By, (t) = Taken By, (c) = Cosigned By      Initials Name Provider Type    PC Padmini Dawn, PT Physical Therapist                   Mobility       Row Name 11/30/23 1114          Bed Mobility    Comment, (Bed Mobility) in chair  -PC       Row Name 11/30/23 1114          Sit-Stand Transfer    Sit-Stand Chase (Transfers) independent  -PC       Row Name 11/30/23 1114          Gait/Stairs (Locomotion)    Chase Level (Gait) independent  -PC     Distance in Feet (Gait) 200 ft  -PC     Comment, (Gait/Stairs) Normal gait pattern with no loss of balance  -PC               User Key  (r) = Recorded By, (t) = Taken By, (c) = Cosigned By      Initials Name Provider Type    PC Padmini Dawn, PT Physical Therapist                   Obj/Interventions       Row Name 11/30/23 1115          Balance    Comment, Balance WNL  -PC               User Key  (r) = Recorded By, (t) = Taken By, (c) = Cosigned By      Initials Name Provider Type    PC Padmini Dawn, PT Physical Therapist                   Goals/Plan    No documentation.                  Clinical Impression       Row Name 11/30/23 1116          Pain    Pretreatment Pain Rating 0/10 - no pain  -PC       Row Name 11/30/23 1116          Plan of Care Review    Plan of Care Reviewed With patient  -PC     Progress improving  -PC     Outcome Evaluation Pt cont to do well with mobility and is now walking independently with a rolling walker, 200 ft, no further indication for PT, pt is safe to walk with nursing staff and family. Pt is appropriate to return to home at discharge, will sign off  -PC       Row Name 11/30/23 1116          Positioning and Restraints    Pre-Treatment Position sitting in chair/recliner  -PC     Post Treatment Position chair  -PC     In Chair sitting;call light within reach;encouraged to call for assist   -PC               User Key  (r) = Recorded By, (t) = Taken By, (c) = Cosigned By      Initials Name Provider Type    PC Padmini Dawn, PT Physical Therapist                   Outcome Measures       Row Name 11/30/23 1309 11/30/23 0800       How much help from another person do you currently need...    Turning from your back to your side while in flat bed without using bedrails? 4  -PC 4  -TR    Moving from lying on back to sitting on the side of a flat bed without bedrails? 4  -PC 4  -TR    Moving to and from a bed to a chair (including a wheelchair)? 4  -PC 3  -TR    Standing up from a chair using your arms (e.g., wheelchair, bedside chair)? 4  -PC 3  -TR    Climbing 3-5 steps with a railing? 3  -PC 3  -TR    To walk in hospital room? 4  -PC 3  -TR    AM-PAC 6 Clicks Score (PT) 23  -PC 20  -TR    Highest Level of Mobility Goal 7 --> Walk 25 feet or more  -PC 6 --> Walk 10 steps or more  -TR      Row Name 11/30/23 0415          How much help from another person do you currently need...    Turning from your back to your side while in flat bed without using bedrails? 4  -EA     Moving from lying on back to sitting on the side of a flat bed without bedrails? 4  -EA     Moving to and from a bed to a chair (including a wheelchair)? 3  -EA     Standing up from a chair using your arms (e.g., wheelchair, bedside chair)? 3  -EA     Climbing 3-5 steps with a railing? 3  -EA     To walk in hospital room? 3  -EA     AM-PAC 6 Clicks Score (PT) 20  -EA     Highest Level of Mobility Goal 6 --> Walk 10 steps or more  -EA               User Key  (r) = Recorded By, (t) = Taken By, (c) = Cosigned By      Initials Name Provider Type    Padmini Tom, PT Physical Therapist    Nelida Dodge RN Registered Nurse    Eloise Tsai RN Registered Nurse                                 Physical Therapy Education       Title: PT OT SLP Therapies (Done)       Topic: Physical Therapy (Done)       Point: Mobility training  (Done)       Learning Progress Summary             Patient Acceptance, E,D, DU by PC at 11/30/2023 1309    Acceptance, E, VU by MM at 11/29/2023 0945    Acceptance, E,D,TB, VU by SK at 11/28/2023 1123                         Point: Home exercise program (Done)       Learning Progress Summary             Patient Acceptance, E,D, DU by PC at 11/30/2023 1309    Acceptance, E, VU by MM at 11/29/2023 0945    Acceptance, E,D,TB, VU by SK at 11/28/2023 1123                         Point: Body mechanics (Done)       Learning Progress Summary             Patient Acceptance, E,D, DU by PC at 11/30/2023 1309    Acceptance, E, VU by MM at 11/29/2023 0945    Acceptance, E,D,TB, VU by SK at 11/28/2023 1123                         Point: Precautions (Done)       Learning Progress Summary             Patient Acceptance, E,D, DU by PC at 11/30/2023 1309    Acceptance, E, VU by MM at 11/29/2023 0945    Acceptance, E,D,TB, VU by SK at 11/28/2023 1123                                         User Key       Initials Effective Dates Name Provider Type Discipline    PC 06/16/21 -  Padmini Dawn PT Physical Therapist PT     06/16/21 -  Dayanara Alvarez, RN Registered Nurse Nurse    SK 10/10/23 -  Nickolas Nathan PT Student PT Student PT                  PT Recommendation and Plan     Plan of Care Reviewed With: patient  Progress: improving  Outcome Evaluation: Pt cont to do well with mobility and is now walking independently with a rolling walker, 200 ft, no further indication for PT, pt is safe to walk with nursing staff and family. Pt is appropriate to return to home at discharge, will sign off     Time Calculation:         PT Charges       Row Name 11/30/23 1310             Time Calculation    Start Time 1105  -PC      Stop Time 1128  -PC      Time Calculation (min) 23 min  -PC      PT Received On 11/30/23  -PC                User Key  (r) = Recorded By, (t) = Taken By, (c) = Cosigned By      Initials Name Provider Type     PC Padmini Dawn, PT Physical Therapist                  Therapy Charges for Today       Code Description Service Date Service Provider Modifiers Qty    18980189929 HC PT THER PROC EA 15 MIN 11/30/2023 Padmini Dawn, PT GP 2            PT G-Codes  Outcome Measure Options: AM-PAC 6 Clicks Basic Mobility (PT)  AM-PAC 6 Clicks Score (PT): 23  PT Discharge Summary  Anticipated Discharge Disposition (PT): home    Padmini Dawn, EVELIO  11/30/2023

## 2023-11-30 NOTE — PROGRESS NOTES
LOS: 3 days   Patient Care Team:  Matt Rose MD as PCP - General (Family Medicine)  Marcie Robbins MD as Consulting Physician (Cardiology)  Nilesh Danielle MD as Consulting Physician (Urology)  Lina Morris [facility] as Pharmacist  Lev Mckeon, PharmD as Pharmacist (Pharmacy)  Rogelio Tucker MD as Consulting Physician (Pulmonary Disease)    Chief Complaint:     F/u chf    Interval History:     Her dyspnea is slightly better. She has no chest pain.  She has a cough.     Objective   Vital Signs  Temp:  [97.7 °F (36.5 °C)-98.6 °F (37 °C)] 97.8 °F (36.6 °C)  Heart Rate:  [] 102  Resp:  [16-20] 18  BP: ()/(57-75) 104/57    Intake/Output Summary (Last 24 hours) at 11/30/2023 0935  Last data filed at 11/30/2023 0801  Gross per 24 hour   Intake 720 ml   Output 950 ml   Net -230 ml       Comfortable NAD  Neck supple, no JVD or thyromegaly appreciated  S1/S2 RRR, no m/r/g  Lungs exp rhonchi at bases, normal effort  Abdomen S/NT/ND (+) BS, no HSM appreciated  Extremities warm, no clubbing, cyanosis, or edema  No visible or palpable skin lesions  A/Ox4, mood and affect appropriate    Results Review:      Results from last 7 days   Lab Units 11/29/23  0307 11/28/23  0238 11/27/23  0404   SODIUM mmol/L 138 138 141   POTASSIUM mmol/L 3.7 3.7 3.6   CHLORIDE mmol/L 101 100 102   CO2 mmol/L 24.9 28.5 24.7   BUN mg/dL 20 15 12   CREATININE mg/dL 0.96 1.13* 0.92   GLUCOSE mg/dL 104* 98 116*   CALCIUM mg/dL 9.2 9.5 9.2     Results from last 7 days   Lab Units 11/26/23  1851   HSTROP T ng/L 25*     Results from last 7 days   Lab Units 11/29/23  0307 11/28/23  0238 11/27/23  0404   WBC 10*3/mm3 9.10 8.48 10.00   HEMOGLOBIN g/dL 10.7* 10.4* 10.4*   HEMATOCRIT % 32.7* 32.1* 31.8*   PLATELETS 10*3/mm3 268 291 277             Results from last 7 days   Lab Units 11/26/23  1851   MAGNESIUM mg/dL 2.2           I reviewed the patient's new clinical results.  I personally viewed and interpreted the patient's EKG/Telemetry  data        Medication Review:   apixaban, 2.5 mg, Oral, Q12H  atorvastatin, 40 mg, Oral, Nightly  budesonide-formoterol, 2 puff, Inhalation, BID - RT  carvedilol, 3.125 mg, Oral, BID With Meals  [START ON 12/1/2023] citalopram, 20 mg, Oral, Daily  digoxin, 125 mcg, Oral, Daily  guaiFENesin, 600 mg, Oral, BID  ipratropium-albuterol, 3 mL, Nebulization, BID  potassium chloride, 10 mEq, Oral, Daily  senna-docusate sodium, 2 tablet, Oral, BID  sodium chloride, 10 mL, Intravenous, Q12H  sodium chloride, 4 mL, Nebulization, BID - RT  spironolactone, 12.5 mg, Oral, Daily             Assessment & Plan       Acute on chronic diastolic congestive heart failure    Primary hypertension    Chronic coronary artery disease    Chronic respiratory failure with hypoxia    Iron deficiency anemia    Permanent atrial fibrillation    COPD (chronic obstructive pulmonary disease)    Acute on chronic diastolic congestive heart failure - she responded well to IV diuretics.    Shortness of breath - improving, still some shortness of breath with exertion.   Non-rheumatic mitral valve regurgitation - mild on last ECHO (10/6/23)   Hypertension - at goal   Permanent atrial fibrillation - heart rate controlled on metoprolol.  Continue Eliquis 2.5 mg BID.   Chronic coronary artery disease - diagnosed by cardiac catheterization in 2007. She had a normal nuclear stress test in 2018.   Hyperlipidemia, on atorvastatin.   Bronchiectasis with MAC and aspergillus - follows with Dr. Tucker as an outpatient. Pulmonology is following.   Anemia - mild.     Shortness of breath slightly better today. Bp still low - stop spironolactone.  Start lasix 80mg bid po - she received 2 doses of iv lasix yesterday and does feel better.  She gained fluid on torsemide. Check cxr.     Liyah Cheng MD  11/30/23  09:35 EST

## 2023-11-30 NOTE — PLAN OF CARE
Goal Outcome Evaluation:  Plan of Care Reviewed With: patient        Progress: no change  Outcome Evaluation: Pt remains inpatient d/t FVO, IV diuretics re-ordered, output documented in flowsheet. Pt does have cough for which Mucinex & Tessalon Perle are ordered. Pt ambulates to bathroom with walker, bed alarm activated. A. Fib on monitor, HR 60-70s. No acute complaints at this moment. Will continue to  monitor

## 2023-11-30 NOTE — PROGRESS NOTES
Name: Agnieszka Rizzo ADMIT: 2023   : 1930  PCP: Matt Rose MD    MRN: [id number] LOS: 3 days   AGE/SEX: 89 y.o. female  ROOM: [facility]/     Subjective   Subjective   No acute events. Exertional dyspnea has improved. Having a non-productive cough.. Denies chest pain.   Her daughter is at bedside.    Objective   Objective   Vital Signs  Temp:  [97.6 °F (36.4 °C)-98.6 °F (37 °C)] 97.6 °F (36.4 °C)  Heart Rate:  [] 111  Resp:  [18-20] 18  BP: (104-136)/(57-81) 136/81  SpO2:  [93 %-100 %] 97 %  on   ;   Device (Oxygen Therapy): room air  Body mass index is 23.52 kg/m².  Physical Exam  Vitals and nursing note reviewed.   Constitutional:       General: She is not in acute distress.     Appearance: She is ill-appearing (chronically). She is not toxic-appearing or diaphoretic.   HENT:      Head: Normocephalic and atraumatic.      Nose: Nose normal.      Mouth/Throat:      Mouth: Mucous membranes are moist.      Pharynx: Oropharynx is clear.   Eyes:      Conjunctiva/sclera: Conjunctivae normal.      Pupils: Pupils are equal, round, and reactive to light.   Cardiovascular:      Rate and Rhythm: Tachycardia present. Rhythm irregular.      Pulses: Normal pulses.   Pulmonary:      Effort: Pulmonary effort is normal.      Breath sounds: Rales (bibasilar) present.   Abdominal:      General: Bowel sounds are normal.      Palpations: Abdomen is soft.   Musculoskeletal:         General: No swelling or tenderness.      Cervical back: Neck supple.   Skin:     General: Skin is warm and dry.      Capillary Refill: Capillary refill takes less than 2 seconds.   Neurological:      General: No focal deficit present.      Mental Status: She is alert.   Psychiatric:         Mood and Affect: Mood normal.         Behavior: Behavior normal.       Results Review     I reviewed the patient's new clinical results.  Results from last 7 days   Lab Units 23  0307 23  0238 23  0404 23  1851   WBC 10*3/mm3  9.10 8.48 10.00 13.29*   HEMOGLOBIN g/dL 10.7* 10.4* 10.4* 11.2*   PLATELETS 10*3/mm3 268 291 277 281     Results from last 7 days   Lab Units 11/30/23  0946 11/29/23  0307 11/28/23  0238 11/27/23  0404   SODIUM mmol/L 140 138 138 141   POTASSIUM mmol/L 3.6 3.7 3.7 3.6   CHLORIDE mmol/L 102 101 100 102   CO2 mmol/L 26.9 24.9 28.5 24.7   BUN mg/dL 16 20 15 12   CREATININE mg/dL 0.91 0.96 1.13* 0.92   GLUCOSE mg/dL 106* 104* 98 116*   EGFR mL/min/1.73 58.9* 55.3* 45.5* 58.2*     Results from last 7 days   Lab Units 11/26/23  1851   ALBUMIN g/dL 3.9   BILIRUBIN mg/dL 0.3   ALK PHOS U/L 91   AST (SGOT) U/L 22   ALT (SGPT) U/L 12     Results from last 7 days   Lab Units 11/30/23  0946 11/29/23  0307 11/28/23  0238 11/27/23  0404 11/26/23  1851   CALCIUM mg/dL 9.4 9.2 9.5 9.2 9.4   ALBUMIN g/dL  --   --   --   --  3.9   MAGNESIUM mg/dL  --   --   --   --  2.2     Results from last 7 days   Lab Units 11/27/23  0404 11/26/23  1851   PROCALCITONIN ng/mL 0.05 0.05     Hemoglobin A1C   Date/Time Value Ref Range Status   11/28/2023 0238 6.10 (H) 4.80 - 5.60 % Final       No radiology results for the last day    I have personally reviewed all medications:  Scheduled Medications  apixaban, 2.5 mg, Oral, Q12H  atorvastatin, 40 mg, Oral, Nightly  budesonide-formoterol, 2 puff, Inhalation, BID - RT  carvedilol, 3.125 mg, Oral, BID With Meals  [START ON 12/1/2023] citalopram, 20 mg, Oral, Daily  digoxin, 125 mcg, Oral, Daily  furosemide, 80 mg, Oral, BID  guaiFENesin, 600 mg, Oral, BID  ipratropium-albuterol, 3 mL, Nebulization, BID  potassium chloride, 20 mEq, Oral, Daily  senna-docusate sodium, 2 tablet, Oral, BID  sodium chloride, 10 mL, Intravenous, Q12H  sodium chloride, 4 mL, Nebulization, 4x Daily - RT    Infusions   Diet  Diet: Cardiac Diets; Healthy Heart (2-3 Na+); Texture: Regular Texture (IDDSI 7); Fluid Consistency: Thin (IDDSI 0)    I have personally reviewed:  [x]  Laboratory   []  Microbiology   [x]  Radiology   [x]   EKG/Telemetry  []  Cardiology/Vascular   []  Pathology    []  Records    Assessment/Plan     Active Hospital Problems    Diagnosis  POA    **Acute on chronic diastolic congestive heart failure [I50.33]  Yes    COPD (chronic obstructive pulmonary disease) [J44.9]  Yes    Permanent atrial fibrillation [I48.21]  Yes    Iron deficiency anemia [D50.9]  Yes    Chronic respiratory failure with hypoxia [J96.11]  Yes    Primary hypertension [I10]  Yes    Chronic coronary artery disease [I25.10]  Yes      Resolved Hospital Problems   No resolved problems to display.   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  DNR.  Discussed with patient, family, and nursing staff.  Anticipate discharge home in 1-2 days.    Portions of this text have been copied and I have reviewed these. They are accurate as of 11/30/2023      Karel Porras MD  [facility]  11/30/23  17:29 EST

## 2023-12-01 LAB
ANION GAP SERPL CALCULATED.3IONS-SCNC: 11 MMOL/L (ref 5–15)
BUN SERPL-MCNC: 17 MG/DL (ref 8–23)
BUN/CREAT SERPL: 16.3 (ref 7–25)
CALCIUM SPEC-SCNC: 9.5 MG/DL (ref 8.2–9.6)
CHLORIDE SERPL-SCNC: 101 MMOL/L (ref 98–107)
CO2 SERPL-SCNC: 26 MMOL/L (ref 22–29)
CREAT SERPL-MCNC: 1.04 MG/DL (ref 0.57–1)
EGFRCR SERPLBLD CKD-EPI 2021: 50.2 ML/MIN/1.73
GLUCOSE SERPL-MCNC: 106 MG/DL (ref 65–99)
POTASSIUM SERPL-SCNC: 4.2 MMOL/L (ref 3.5–5.2)
SODIUM SERPL-SCNC: 138 MMOL/L (ref 136–145)

## 2023-12-01 PROCEDURE — 99232 SBSQ HOSP IP/OBS MODERATE 35: CPT | Performed by: NURSE PRACTITIONER

## 2023-12-01 PROCEDURE — 94761 N-INVAS EAR/PLS OXIMETRY MLT: CPT

## 2023-12-01 PROCEDURE — 94760 N-INVAS EAR/PLS OXIMETRY 1: CPT

## 2023-12-01 PROCEDURE — 94668 MNPJ CHEST WALL SBSQ: CPT

## 2023-12-01 PROCEDURE — 94799 UNLISTED PULMONARY SVC/PX: CPT

## 2023-12-01 PROCEDURE — 80048 BASIC METABOLIC PNL TOTAL CA: CPT | Performed by: INTERNAL MEDICINE

## 2023-12-01 PROCEDURE — 94664 DEMO&/EVAL PT USE INHALER: CPT

## 2023-12-01 PROCEDURE — 25010000002 DIGOXIN PER 500 MCG: Performed by: NURSE PRACTITIONER

## 2023-12-01 PROCEDURE — 94667 MNPJ CHEST WALL 1ST: CPT

## 2023-12-01 RX ORDER — IPRATROPIUM BROMIDE AND ALBUTEROL SULFATE 2.5; .5 MG/3ML; MG/3ML
3 SOLUTION RESPIRATORY (INHALATION)
Status: DISCONTINUED | OUTPATIENT
Start: 2023-12-01 | End: 2023-12-02 | Stop reason: HOSPADM

## 2023-12-01 RX ORDER — DIGOXIN 0.25 MG/ML
250 INJECTION INTRAMUSCULAR; INTRAVENOUS ONCE
Status: COMPLETED | OUTPATIENT
Start: 2023-12-01 | End: 2023-12-01

## 2023-12-01 RX ORDER — SODIUM CHLORIDE FOR INHALATION 7 %
4 VIAL, NEBULIZER (ML) INHALATION
Status: DISCONTINUED | OUTPATIENT
Start: 2023-12-01 | End: 2023-12-02 | Stop reason: HOSPADM

## 2023-12-01 RX ADMIN — BENZONATATE 200 MG: 100 CAPSULE ORAL at 08:12

## 2023-12-01 RX ADMIN — IPRATROPIUM BROMIDE AND ALBUTEROL SULFATE 3 ML: 2.5; .5 SOLUTION RESPIRATORY (INHALATION) at 20:43

## 2023-12-01 RX ADMIN — Medication 10 ML: at 20:37

## 2023-12-01 RX ADMIN — DIGOXIN 125 MCG: 125 TABLET ORAL at 12:49

## 2023-12-01 RX ADMIN — POTASSIUM CHLORIDE 20 MEQ: 750 TABLET, EXTENDED RELEASE ORAL at 08:12

## 2023-12-01 RX ADMIN — GUAIFENESIN 600 MG: 600 TABLET, EXTENDED RELEASE ORAL at 20:32

## 2023-12-01 RX ADMIN — FUROSEMIDE 80 MG: 80 TABLET ORAL at 08:12

## 2023-12-01 RX ADMIN — CARVEDILOL 3.12 MG: 3.12 TABLET, FILM COATED ORAL at 08:12

## 2023-12-01 RX ADMIN — BUDESONIDE AND FORMOTEROL FUMARATE DIHYDRATE 2 PUFF: 160; 4.5 AEROSOL RESPIRATORY (INHALATION) at 07:05

## 2023-12-01 RX ADMIN — IPRATROPIUM BROMIDE AND ALBUTEROL SULFATE 3 ML: 2.5; .5 SOLUTION RESPIRATORY (INHALATION) at 07:05

## 2023-12-01 RX ADMIN — FUROSEMIDE 80 MG: 80 TABLET ORAL at 17:26

## 2023-12-01 RX ADMIN — BENZONATATE 200 MG: 100 CAPSULE ORAL at 20:36

## 2023-12-01 RX ADMIN — BUDESONIDE AND FORMOTEROL FUMARATE DIHYDRATE 2 PUFF: 160; 4.5 AEROSOL RESPIRATORY (INHALATION) at 20:44

## 2023-12-01 RX ADMIN — DIGOXIN 250 MCG: 0.25 INJECTION INTRAMUSCULAR; INTRAVENOUS at 17:26

## 2023-12-01 RX ADMIN — DOCUSATE SODIUM 50MG AND SENNOSIDES 8.6MG 2 TABLET: 8.6; 5 TABLET, FILM COATED ORAL at 20:32

## 2023-12-01 RX ADMIN — IPRATROPIUM BROMIDE AND ALBUTEROL SULFATE 3 ML: 2.5; .5 SOLUTION RESPIRATORY (INHALATION) at 23:27

## 2023-12-01 RX ADMIN — Medication 4 ML: at 23:30

## 2023-12-01 RX ADMIN — CARVEDILOL 3.12 MG: 3.12 TABLET, FILM COATED ORAL at 17:26

## 2023-12-01 RX ADMIN — Medication 10 ML: at 08:12

## 2023-12-01 RX ADMIN — Medication 4 ML: at 20:43

## 2023-12-01 RX ADMIN — APIXABAN 2.5 MG: 2.5 TABLET, FILM COATED ORAL at 20:32

## 2023-12-01 RX ADMIN — ATORVASTATIN CALCIUM 40 MG: 20 TABLET, FILM COATED ORAL at 20:32

## 2023-12-01 RX ADMIN — GUAIFENESIN 600 MG: 600 TABLET, EXTENDED RELEASE ORAL at 08:12

## 2023-12-01 RX ADMIN — CITALOPRAM 20 MG: 20 TABLET, FILM COATED ORAL at 08:12

## 2023-12-01 RX ADMIN — Medication 4 ML: at 07:05

## 2023-12-01 RX ADMIN — APIXABAN 2.5 MG: 2.5 TABLET, FILM COATED ORAL at 08:12

## 2023-12-01 NOTE — PROGRESS NOTES
"    Patient Name: Agnieszka Rizzo  :1930  89 y.o.      Patient Care Team:  Matt Rose MD as PCP - General (Family Medicine)  Marcie Robbins MD as Consulting Physician (Cardiology)  Nilesh Danielle MD as Consulting Physician (Urology)  Lina Morris RPH as Pharmacist  Lev Mckeon PharmD as Pharmacist (Pharmacy)  Rogelio Tucker MD as Consulting Physician (Pulmonary Disease)    Chief Complaint: follow up CHF    Interval History: transitioned to oral lasix yesterday . Weight down one pound. Modest urine output. She doesn't feel like she is urinating any more than usual.        Objective   Vital Signs  Temp:  [97.6 °F (36.4 °C)-99.2 °F (37.3 °C)] 97.9 °F (36.6 °C)  Heart Rate:  [] 121  Resp:  [18] 18  BP: (101-136)/(66-81) 108/66    Intake/Output Summary (Last 24 hours) at 2023 1440  Last data filed at 2023 1330  Gross per 24 hour   Intake 2883 ml   Output 1150 ml   Net 1733 ml     Flowsheet Rows      Flowsheet Row First Filed Value   Admission Height 157.5 cm (62\") Documented at 2023   Admission Weight 57.2 kg (126 lb) Documented at 2023 1835            Physical Exam:   General Appearance:    Alert, cooperative, in no acute distress   Lungs:     Clear to auscultation.  Normal respiratory effort and rate.      Heart:    irregular rhythm and normal rate, normal S1 and S2, no murmurs, gallops or rubs.     Chest Wall:    No abnormalities observed   Abdomen:     Soft, nontender, positive bowel sounds.     Extremities:   no cyanosis, clubbing or edema.  No marked joint deformities.  Adequate musculoskeletal strength.       Results Review:    Results from last 7 days   Lab Units 23  0946   SODIUM mmol/L 140   POTASSIUM mmol/L 3.6   CHLORIDE mmol/L 102   CO2 mmol/L 26.9   BUN mg/dL 16   CREATININE mg/dL 0.91   GLUCOSE mg/dL 106*   CALCIUM mg/dL 9.4     Results from last 7 days   Lab Units 23  1851   HSTROP T ng/L 25*     Results from last 7 days   Lab Units " 11/29/23  0307   WBC 10*3/mm3 9.10   HEMOGLOBIN g/dL 10.7*   HEMATOCRIT % 32.7*   PLATELETS 10*3/mm3 268         Results from last 7 days   Lab Units 11/26/23  1851   MAGNESIUM mg/dL 2.2                   Medication Review:   apixaban, 2.5 mg, Oral, Q12H  atorvastatin, 40 mg, Oral, Nightly  budesonide-formoterol, 2 puff, Inhalation, BID - RT  carvedilol, 3.125 mg, Oral, BID With Meals  citalopram, 20 mg, Oral, Daily  digoxin, 125 mcg, Oral, Daily  furosemide, 80 mg, Oral, BID  guaiFENesin, 600 mg, Oral, BID  ipratropium-albuterol, 3 mL, Nebulization, Q6H - RT  potassium chloride, 20 mEq, Oral, Daily  senna-docusate sodium, 2 tablet, Oral, BID  sodium chloride, 10 mL, Intravenous, Q12H  sodium chloride, 4 mL, Nebulization, Q6H - RT              Assessment & Plan   1.acute on chronic diastolic congestive heart failure , she responded well to IV diuretics.  2. Non rheumatic mitral valve regurgitation  3. Hypertension appears stable. Lowish at times. Monitor with increased carvedilol.   4. Permanent atrial fibrillation , HR controlled. Continue apixaban 2.5 mg BID  5. Coronary artery disease, cardiac cath 2007. Stress 2018.  6. Bronchiectasis with MAC And aspergillus. Follows with Dr. Tucker as outpatient.     CXR reviewed. Continue oral lasix. No labs today. Repeat BMP in AM.   Hr is a little fast today.     Got IV digoxin 250 mcg on 11/29. Then started on oral and has gotten two doses of 125 mcg. Will repeat 250 mcg for a more complete load (would be a total of 750 mcg). Carvedilol was decreased 11/29 for hypotension. Will leave it at 3.125 mg BID.     Increase activity.    PJ Day  [facility]  12/01/23  14:40 EST

## 2023-12-01 NOTE — CONSULTS
Completed an updated Advanced Directive with the patient.     Family at bedside providing support. Mood seemed to be positive, with stories and laughter from patient.     Copies of A.D. placed in Patient's Chart, H.I.M. basket and with patient.

## 2023-12-01 NOTE — PROGRESS NOTES
Name: Agnieszka Rizzo ADMIT: 2023   : 1930  PCP: Matt Rose MD    MRN: [id number] LOS: 4 days   AGE/SEX: 89 y.o. female  ROOM: [facility]     Subjective   Subjective   No acute events. Patient denies new complaints. Non-productive cough continues. Dyspnea is a little better. Denies chest pain.   No family at bedside.    Objective   Objective   Vital Signs  Temp:  [97.9 °F (36.6 °C)-99.2 °F (37.3 °C)] 98.6 °F (37 °C)  Heart Rate:  [] 114  Resp:  [18] 18  BP: (101-126)/(65-73) 122/65  SpO2:  [91 %-100 %] 98 %  on   ;   Device (Oxygen Therapy): room air  Body mass index is 23.3 kg/m².  Physical Exam  Vitals and nursing note reviewed.   Constitutional:       General: She is not in acute distress.     Appearance: She is ill-appearing (chronically). She is not toxic-appearing or diaphoretic.   HENT:      Head: Normocephalic and atraumatic.      Nose: Nose normal.      Mouth/Throat:      Mouth: Mucous membranes are moist.      Pharynx: Oropharynx is clear.   Eyes:      Conjunctiva/sclera: Conjunctivae normal.      Pupils: Pupils are equal, round, and reactive to light.   Cardiovascular:      Rate and Rhythm: Tachycardia present. Rhythm irregular.      Pulses: Normal pulses.   Pulmonary:      Effort: Pulmonary effort is normal.      Breath sounds: Examination of the right-lower field reveals decreased breath sounds. Examination of the left-lower field reveals decreased breath sounds.   Abdominal:      General: Bowel sounds are normal.      Palpations: Abdomen is soft.   Musculoskeletal:         General: No swelling or tenderness.      Cervical back: Neck supple.   Skin:     General: Skin is warm and dry.      Capillary Refill: Capillary refill takes less than 2 seconds.   Neurological:      General: No focal deficit present.      Mental Status: She is alert.   Psychiatric:         Mood and Affect: Mood normal.         Behavior: Behavior normal.       Results Review     I reviewed the patient's new  "clinical results.  Results from last 7 days   Lab Units 11/29/23  0307 11/28/23 0238 11/27/23 0404 11/26/23  1851   WBC 10*3/mm3 9.10 8.48 10.00 13.29*   HEMOGLOBIN g/dL 10.7* 10.4* 10.4* 11.2*   PLATELETS 10*3/mm3 268 291 277 281     Results from last 7 days   Lab Units 11/30/23  0946 11/29/23 0307 11/28/23 0238 11/27/23  0404   SODIUM mmol/L 140 138 138 141   POTASSIUM mmol/L 3.6 3.7 3.7 3.6   CHLORIDE mmol/L 102 101 100 102   CO2 mmol/L 26.9 24.9 28.5 24.7   BUN mg/dL 16 20 15 12   CREATININE mg/dL 0.91 0.96 1.13* 0.92   GLUCOSE mg/dL 106* 104* 98 116*   EGFR mL/min/1.73 58.9* 55.3* 45.5* 58.2*     Results from last 7 days   Lab Units 11/26/23  1851   ALBUMIN g/dL 3.9   BILIRUBIN mg/dL 0.3   ALK PHOS U/L 91   AST (SGOT) U/L 22   ALT (SGPT) U/L 12     Results from last 7 days   Lab Units 11/30/23  0946 11/29/23 0307 11/28/23 0238 11/27/23 0404 11/26/23  1851   CALCIUM mg/dL 9.4 9.2 9.5 9.2 9.4   ALBUMIN g/dL  --   --   --   --  3.9   MAGNESIUM mg/dL  --   --   --   --  2.2     Results from last 7 days   Lab Units 11/27/23 0404 11/26/23  1851   PROCALCITONIN ng/mL 0.05 0.05     No results found for: \"HGBA1C\", \"POCGLU\"      No radiology results for the last day    I have personally reviewed all medications:  Scheduled Medications  apixaban, 2.5 mg, Oral, Q12H  atorvastatin, 40 mg, Oral, Nightly  budesonide-formoterol, 2 puff, Inhalation, BID - RT  carvedilol, 3.125 mg, Oral, BID With Meals  citalopram, 20 mg, Oral, Daily  digoxin, 125 mcg, Oral, Daily  furosemide, 80 mg, Oral, BID  guaiFENesin, 600 mg, Oral, BID  ipratropium-albuterol, 3 mL, Nebulization, Q6H - RT  potassium chloride, 20 mEq, Oral, Daily  senna-docusate sodium, 2 tablet, Oral, BID  sodium chloride, 10 mL, Intravenous, Q12H  sodium chloride, 4 mL, Nebulization, Q6H - RT    Infusions   Diet  Diet: Cardiac Diets; Healthy Heart (2-3 Na+); Texture: Regular Texture (IDDSI 7); Fluid Consistency: Thin (IDDSI 0)    I have personally reviewed:  [x]  " Laboratory   []  Microbiology   [x]  Radiology   [x]  EKG/Telemetry  []  Cardiology/Vascular   []  Pathology    []  Records    Assessment/Plan     Active Hospital Problems    Diagnosis  POA    **Acute on chronic diastolic congestive heart failure [I50.33]  Yes    COPD (chronic obstructive pulmonary disease) [J44.9]  Yes    Permanent atrial fibrillation [I48.21]  Yes    Iron deficiency anemia [D50.9]  Yes    Chronic respiratory failure with hypoxia [J96.11]  Yes    Primary hypertension [I10]  Yes    Chronic coronary artery disease [I25.10]  Yes      Resolved Hospital Problems   No resolved problems to display.   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  DNR.  Discussed with patient and nursing staff.  Anticipate discharge home in 1-2 days.    Portions of this text have been copied and I have reviewed these. They are accurate as of 12/1/2023      Karel Porras MD  [facility]  12/01/23  17:38 EST

## 2023-12-02 ENCOUNTER — READMISSION MANAGEMENT (OUTPATIENT)
Dept: CALL CENTER | Facility: HOSPITAL | Age: 88
End: 2023-12-02
Payer: MEDICARE

## 2023-12-02 ENCOUNTER — HOME HEALTH ADMISSION (OUTPATIENT)
Dept: HOME HEALTH SERVICES | Facility: HOME HEALTHCARE | Age: 88
End: 2023-12-02
Payer: MEDICARE

## 2023-12-02 ENCOUNTER — DOCUMENTATION (OUTPATIENT)
Dept: HOME HEALTH SERVICES | Facility: HOME HEALTHCARE | Age: 88
End: 2023-12-02
Payer: MEDICARE

## 2023-12-02 VITALS
HEART RATE: 99 BPM | TEMPERATURE: 97.9 F | OXYGEN SATURATION: 95 % | SYSTOLIC BLOOD PRESSURE: 126 MMHG | BODY MASS INDEX: 23.66 KG/M2 | DIASTOLIC BLOOD PRESSURE: 69 MMHG | HEIGHT: 62 IN | RESPIRATION RATE: 20 BRPM | WEIGHT: 128.6 LBS

## 2023-12-02 LAB
ANION GAP SERPL CALCULATED.3IONS-SCNC: 9.8 MMOL/L (ref 5–15)
BASOPHILS # BLD AUTO: 0.05 10*3/MM3 (ref 0–0.2)
BASOPHILS NFR BLD AUTO: 0.6 % (ref 0–1.5)
BUN SERPL-MCNC: 15 MG/DL (ref 8–23)
BUN/CREAT SERPL: 19.2 (ref 7–25)
CALCIUM SPEC-SCNC: 9.2 MG/DL (ref 8.2–9.6)
CHLORIDE SERPL-SCNC: 103 MMOL/L (ref 98–107)
CO2 SERPL-SCNC: 27.2 MMOL/L (ref 22–29)
CREAT SERPL-MCNC: 0.78 MG/DL (ref 0.57–1)
DEPRECATED RDW RBC AUTO: 48.9 FL (ref 37–54)
EGFRCR SERPLBLD CKD-EPI 2021: 70.9 ML/MIN/1.73
EOSINOPHIL # BLD AUTO: 0.22 10*3/MM3 (ref 0–0.4)
EOSINOPHIL NFR BLD AUTO: 2.7 % (ref 0.3–6.2)
ERYTHROCYTE [DISTWIDTH] IN BLOOD BY AUTOMATED COUNT: 13.9 % (ref 12.3–15.4)
GLUCOSE SERPL-MCNC: 117 MG/DL (ref 65–99)
HCT VFR BLD AUTO: 30.7 % (ref 34–46.6)
HGB BLD-MCNC: 10.2 G/DL (ref 12–15.9)
LYMPHOCYTES # BLD AUTO: 2.15 10*3/MM3 (ref 0.7–3.1)
LYMPHOCYTES NFR BLD AUTO: 26.3 % (ref 19.6–45.3)
MCH RBC QN AUTO: 32.2 PG (ref 26.6–33)
MCHC RBC AUTO-ENTMCNC: 33.2 G/DL (ref 31.5–35.7)
MCV RBC AUTO: 96.8 FL (ref 79–97)
MONOCYTES # BLD AUTO: 0.91 10*3/MM3 (ref 0.1–0.9)
MONOCYTES NFR BLD AUTO: 11.1 % (ref 5–12)
NEUTROPHILS NFR BLD AUTO: 4.81 10*3/MM3 (ref 1.7–7)
NEUTROPHILS NFR BLD AUTO: 58.7 % (ref 42.7–76)
PLATELET # BLD AUTO: 301 10*3/MM3 (ref 140–450)
PMV BLD AUTO: 10 FL (ref 6–12)
POTASSIUM SERPL-SCNC: 3.4 MMOL/L (ref 3.5–5.2)
RBC # BLD AUTO: 3.17 10*6/MM3 (ref 3.77–5.28)
SODIUM SERPL-SCNC: 140 MMOL/L (ref 136–145)
WBC NRBC COR # BLD AUTO: 8.19 10*3/MM3 (ref 3.4–10.8)

## 2023-12-02 PROCEDURE — 94799 UNLISTED PULMONARY SVC/PX: CPT

## 2023-12-02 PROCEDURE — 99232 SBSQ HOSP IP/OBS MODERATE 35: CPT | Performed by: NURSE PRACTITIONER

## 2023-12-02 PROCEDURE — 94761 N-INVAS EAR/PLS OXIMETRY MLT: CPT

## 2023-12-02 PROCEDURE — 80048 BASIC METABOLIC PNL TOTAL CA: CPT | Performed by: INTERNAL MEDICINE

## 2023-12-02 PROCEDURE — 94669 MECHANICAL CHEST WALL OSCILL: CPT

## 2023-12-02 PROCEDURE — 85025 COMPLETE CBC W/AUTO DIFF WBC: CPT | Performed by: INTERNAL MEDICINE

## 2023-12-02 RX ORDER — CARVEDILOL 3.12 MG/1
3.12 TABLET ORAL 2 TIMES DAILY WITH MEALS
Qty: 60 TABLET | Refills: 0 | Status: SHIPPED | OUTPATIENT
Start: 2023-12-02

## 2023-12-02 RX ORDER — FUROSEMIDE 80 MG
80 TABLET ORAL 2 TIMES DAILY
Qty: 60 TABLET | Refills: 0 | Status: SHIPPED | OUTPATIENT
Start: 2023-12-02 | End: 2023-12-07

## 2023-12-02 RX ORDER — POTASSIUM CHLORIDE 750 MG/1
40 TABLET, FILM COATED, EXTENDED RELEASE ORAL EVERY 4 HOURS
Status: COMPLETED | OUTPATIENT
Start: 2023-12-02 | End: 2023-12-02

## 2023-12-02 RX ORDER — SODIUM CHLORIDE FOR INHALATION 7 %
4 VIAL, NEBULIZER (ML) INHALATION 2 TIMES DAILY
Qty: 240 ML | Refills: 0 | Status: SHIPPED | OUTPATIENT
Start: 2023-12-02

## 2023-12-02 RX ORDER — ATORVASTATIN CALCIUM 40 MG/1
40 TABLET, FILM COATED ORAL NIGHTLY
Qty: 90 TABLET | Refills: 0 | Status: SHIPPED | OUTPATIENT
Start: 2023-12-02

## 2023-12-02 RX ORDER — DIGOXIN 125 MCG
125 TABLET ORAL
Qty: 30 TABLET | Refills: 0 | Status: SHIPPED | OUTPATIENT
Start: 2023-12-03

## 2023-12-02 RX ADMIN — POTASSIUM CHLORIDE 40 MEQ: 750 TABLET, EXTENDED RELEASE ORAL at 14:18

## 2023-12-02 RX ADMIN — POTASSIUM CHLORIDE 40 MEQ: 750 TABLET, EXTENDED RELEASE ORAL at 08:23

## 2023-12-02 RX ADMIN — CARVEDILOL 3.12 MG: 3.12 TABLET, FILM COATED ORAL at 08:23

## 2023-12-02 RX ADMIN — IPRATROPIUM BROMIDE AND ALBUTEROL SULFATE 3 ML: 2.5; .5 SOLUTION RESPIRATORY (INHALATION) at 07:12

## 2023-12-02 RX ADMIN — IPRATROPIUM BROMIDE AND ALBUTEROL SULFATE 3 ML: 2.5; .5 SOLUTION RESPIRATORY (INHALATION) at 12:40

## 2023-12-02 RX ADMIN — Medication 4 ML: at 07:13

## 2023-12-02 RX ADMIN — Medication 4 ML: at 12:41

## 2023-12-02 RX ADMIN — FUROSEMIDE 80 MG: 80 TABLET ORAL at 08:23

## 2023-12-02 RX ADMIN — GUAIFENESIN 600 MG: 600 TABLET, EXTENDED RELEASE ORAL at 08:23

## 2023-12-02 RX ADMIN — APIXABAN 2.5 MG: 2.5 TABLET, FILM COATED ORAL at 08:23

## 2023-12-02 RX ADMIN — DIGOXIN 125 MCG: 125 TABLET ORAL at 14:18

## 2023-12-02 RX ADMIN — Medication 10 ML: at 08:24

## 2023-12-02 RX ADMIN — BUDESONIDE AND FORMOTEROL FUMARATE DIHYDRATE 2 PUFF: 160; 4.5 AEROSOL RESPIRATORY (INHALATION) at 07:14

## 2023-12-02 RX ADMIN — CITALOPRAM 20 MG: 20 TABLET, FILM COATED ORAL at 08:23

## 2023-12-02 NOTE — OUTREACH NOTE
Prep Survey      Flowsheet Row Responses   [place] facility patient discharged from? [place]   Is LACE score < 7 ? No   Eligibility [facility]   Date of Admission 11/26/23   Date of Discharge 12/02/23   Discharge Disposition Home or Self Care   Discharge diagnosis a/c CHF   Does the patient have one of the following disease processes/diagnoses(primary or secondary)? CHF   Does the patient have Home health ordered? Yes   What is the Home health agency?  [facility]   Is there a DME ordered? No   Prep survey completed? Yes            KARLA RUTH - Registered Nurse

## 2023-12-02 NOTE — PROGRESS NOTES
Patient is discharging today . Spoke to daughter who assists with care . Face sheet is correct and please call daughter Michelle for visit scheduling- [phone] . They report no current home health , but have had us in the past.Orders for home health SN and PT in X-1. Thank you !

## 2023-12-02 NOTE — PROGRESS NOTES
"                                              LOS: 4 days   Patient Care Team:  Matt Rose MD as PCP - General (Family Medicine)  Marcie Robbins MD as Consulting Physician (Cardiology)  Nilesh Danielle MD as Consulting Physician (Urology)  Lina Morris [facility] as Pharmacist  Lev Mckeon, PharmD as Pharmacist (Pharmacy)  Rogelio Tucker MD as Consulting Physician (Pulmonary Disease)    Chief Complaint:  F/up COPD, organizing pneumonia and medical problems as below.    Subjective   Interval History      No dyspnea at rest but on exertion. Mild cough mostly dry.     REVIEW OF SYSTEMS:   CARDIOVASCULAR: No chest pain, chest pressure or chest discomfort. No palpitations or edema.       CONSTITUTIONAL: No fever or chills.     Ventilator/Non-Invasive Ventilation Settings (From admission, onward)      None                  Physical Exam:     Vital Signs  Temp:  [97.9 °F (36.6 °C)-99.2 °F (37.3 °C)] 98 °F (36.7 °C)  Heart Rate:  [] 82  Resp:  [18] 18  BP: (101-131)/(65-71) 131/71    Intake/Output Summary (Last 24 hours) at 12/1/2023 2313  Last data filed at 12/1/2023 1712  Gross per 24 hour   Intake 2883 ml   Output 600 ml   Net 2283 ml     Flowsheet Rows      Flowsheet Row First Filed Value   Admission Height 157.5 cm (62\") Documented at 11/26/2023 1835   Admission Weight 57.2 kg (126 lb) Documented at 11/26/2023 1835            PPE used per hospital policy    General Appearance:   Alert, cooperative, in no acute distress   ENMT:  Mallampati score 2, moist mucous membrane   Eyes:  Pupils equal and reactive to light. EOMI   Neck:   Trachea midline. No thyromegaly.   Lungs:   Mild bilateral crackles similar to yesterday's exam. No wheezing or labored breathing.  .    Heart:   Regular rhythm and normal rate, normal S1 and S2, no         murmur   Skin:   No rash or ecchymosis   Abdomen:     Soft. No tenderness. No HSM.   Neuro/psych:  Conscious, alert, oriented x3. Strength 5/5 in upper and lower  ext.  " Appropriate mood and affect   Extremities:  No cyanosis, clubbing or edema.  Warm extremities and well-perfused          Results Review:        Results from last 7 days   Lab Units 12/01/23  1838 11/30/23  0946 11/29/23  0307   SODIUM mmol/L 138 140 138   POTASSIUM mmol/L 4.2 3.6 3.7   CHLORIDE mmol/L 101 102 101   CO2 mmol/L 26.0 26.9 24.9   BUN mg/dL 17 16 20   CREATININE mg/dL 1.04* 0.91 0.96   GLUCOSE mg/dL 106* 106* 104*   CALCIUM mg/dL 9.5 9.4 9.2     Results from last 7 days   Lab Units 11/26/23  1851   HSTROP T ng/L 25*     Results from last 7 days   Lab Units 11/29/23  0307 11/28/23  0238 11/27/23  0404   WBC 10*3/mm3 9.10 8.48 10.00   HEMOGLOBIN g/dL 10.7* 10.4* 10.4*   HEMATOCRIT % 32.7* 32.1* 31.8*   PLATELETS 10*3/mm3 268 291 277         Results from last 7 days   Lab Units 11/26/23  1851   PROBNP pg/mL 3,145.0*                           I reviewed the patient's new clinical results.        Medication Review:   apixaban, 2.5 mg, Oral, Q12H  atorvastatin, 40 mg, Oral, Nightly  budesonide-formoterol, 2 puff, Inhalation, BID - RT  carvedilol, 3.125 mg, Oral, BID With Meals  citalopram, 20 mg, Oral, Daily  digoxin, 125 mcg, Oral, Daily  furosemide, 80 mg, Oral, BID  guaiFENesin, 600 mg, Oral, BID  ipratropium-albuterol, 3 mL, Nebulization, Q6H - RT  potassium chloride, 20 mEq, Oral, Daily  senna-docusate sodium, 2 tablet, Oral, BID  sodium chloride, 10 mL, Intravenous, Q12H  sodium chloride, 4 mL, Nebulization, Q6H - RT             Diagnostic imaging:  I personally and independently reviewed the following images:  CXR 11/30/23: Cardiomegaly and vascular congestion. Fluid in the right fissure. Mild interstitial infiltrates. Similar to CXR 11/26    Assessment     Dyspnea, multifactorial.   Worsening cough  Acute on chronic HFpEF  Paroxysmal atrial fibrillation  Underlying bronchiectasis  Recent treatment for BOOP  Chronic KINA  COPD, no current exacerbation  Moderate MR  History of aspergillosis        Plan        Symbicort 2 puffs twice daily  Duoneb to Q 6 h and add HS neb and CPT to improve mucous clearance  CHF management per cardiology: Lasix 80 mg BID. Coreg. Dig loading today  Anticoagulation: Eliquis 2.5 mg twice daily.  Mucinex 600 mg BID    Discussed with her daughter at bedside    Sung King MD  12/01/23  23:13 EST            This note was dictated utilizing Dragon dictation

## 2023-12-02 NOTE — DISCHARGE SUMMARY
"Date of Admission: 11/26/2023  Date of Discharge:  12/2/2023  Primary Care Physician: Matt Rose MD     Discharge Diagnosis:  Active Hospital Problems    Diagnosis  POA    **Acute on chronic diastolic congestive heart failure [I50.33]  Yes    COPD (chronic obstructive pulmonary disease) [J44.9]  Yes    Permanent atrial fibrillation [I48.21]  Yes    Iron deficiency anemia [D50.9]  Yes    Chronic respiratory failure with hypoxia [J96.11]  Yes    Primary hypertension [I10]  Yes    Chronic coronary artery disease [I25.10]  Yes      Resolved Hospital Problems   No resolved problems to display.       Presenting Problem/History of Present Illness:  CHF (congestive heart failure) [I50.9]  Hyperglycemia [R73.9]  Elevated troponin [R79.89]  History of bronchiectasis [Z87.09]  Leukocytosis, unspecified type [D72.829]  Acute on chronic congestive heart failure, unspecified heart failure type [I50.9]  Congestive heart failure [I50.9]     Hospital Course:  The patient is a 89 y.o. female with a history of COPD, bronchiectasis, and permanent atrial fibrillation who presented with dyspnea. Please see admission H&P for further details. She was found to have a CHF exacerbation and was started on diuretics. Her pulmonary issues complicated this and she was followed by cardiology and pulmonology. She had some rate control issues as well which improved with the medication changes below. Her volume status and symptoms have improved significantly and she is medically stable. She will be discharged home on the revised regimen and should keep close PCP, cardiology, and pulmonology follow up.    Exam Today:  Blood pressure 126/69, pulse 99, temperature 97.9 °F (36.6 °C), temperature source Oral, resp. rate 20, height 157.5 cm (62.01\"), weight 58.3 kg (128 lb 9.6 oz), SpO2 95%, not currently breastfeeding.  Vitals and nursing note reviewed.   Constitutional:       General: She is not in acute distress.     Appearance: She appears " chronically ill. She is not toxic-appearing or diaphoretic.   HENT:      Head: Normocephalic and atraumatic.      Nose: Nose normal.      Mouth/Throat:      Mouth: Mucous membranes are moist.      Pharynx: Oropharynx is clear.   Eyes:      Conjunctiva/sclera: Conjunctivae normal.      Pupils: Pupils are equal, round, and reactive to light.   Cardiovascular:      Rate and Rhythm:Normal rate, irregular rhythm     Pulses: Normal pulses.   Pulmonary:      Effort: Pulmonary effort is normal.      Breath sounds: Decreased bibasilar breath sounds  Abdominal:      General: Bowel sounds are normal.      Palpations: Abdomen is soft.   Musculoskeletal:         General: No swelling or tenderness.      Cervical back: Neck supple.   Skin:     General: Skin is warm and dry.      Capillary Refill: Capillary refill takes less than 2 seconds.   Neurological:      General: No focal deficit present.      Mental Status: She is alert.   Psychiatric:         Mood and Affect: Mood normal.         Consults:   Consults       Date and Time Order Name Status Description    11/27/2023  5:48 PM Inpatient Hospitalist Consult Completed     11/26/2023  9:59 PM Inpatient Pulmonology Consult Completed     11/26/2023  8:51 PM Inpatient Cardiology Consult Completed              Discharge Disposition:  Home or Self Care    Discharge Medications:     Discharge Medications        New Medications        Instructions Start Date   atorvastatin 40 MG tablet  Commonly known as: LIPITOR   40 mg, Oral, Nightly      carvedilol 3.125 MG tablet  Commonly known as: COREG   3.125 mg, Oral, 2 Times Daily With Meals      digoxin 125 MCG tablet  Commonly known as: LANOXIN   125 mcg, Oral, Daily Digoxin   Start Date: December 3, 2023     furosemide 80 MG tablet  Commonly known as: LASIX   80 mg, Oral, 2 Times Daily      sodium chloride 7 % nebulizer solution nebulizer solution  Replaces: sodium chloride 0.9 % nebulizer solution   4 mL, Nebulization, 2 Times Daily              Changes to Medications        Instructions Start Date   FeroSul 325 (65 Fe) MG tablet  Generic drug: ferrous sulfate  What changed:   how much to take  when to take this   TAKE ONE TABLET BY MOUTH DAILY WITH BREAKFAST             Continue These Medications        Instructions Start Date   acetaminophen 325 MG tablet  Commonly known as: TYLENOL   650 mg, Oral, Every 4 Hours PRN      albuterol sulfate  (90 Base) MCG/ACT inhaler  Commonly known as: PROVENTIL HFA;VENTOLIN HFA;PROAIR HFA   2 puffs, Inhalation, Every 6 Hours PRN      apixaban 2.5 MG tablet tablet  Commonly known as: ELIQUIS   2.5 mg, Oral, Every 12 Hours Scheduled      benzonatate 200 MG capsule  Commonly known as: TESSALON   1 capsule, Oral, 3 Times Daily PRN      cholecalciferol 25 MCG (1000 UT) tablet  Commonly known as: VITAMIN D3   1,000 Units, Oral, Daily      citalopram 20 MG tablet  Commonly known as: CeleXA   20 mg, Oral, Daily      fexofenadine 180 MG tablet  Commonly known as: ALLEGRA   180 mg, Oral, Daily      Florajen Acidophilus capsule   1 tablet, Oral, Daily      fluticasone 50 MCG/ACT nasal spray  Commonly known as: FLONASE   2 sprays, Nasal, Nightly PRN      fluticasone-salmeterol 115-21 MCG/ACT inhaler  Commonly known as: ADVAIR HFA   2 puffs, Inhalation, 2 Times Daily - RT      guaiFENesin 600 MG 12 hr tablet  Commonly known as: MUCINEX   600 mg, Oral, 2 Times Daily      ipratropium-albuterol 0.5-2.5 mg/3 ml nebulizer  Commonly known as: DUO-NEB   3 mL, Nebulization, 2 Times Daily      multivitamin with minerals tablet tablet   1 tablet, Oral, 2 Times Daily      potassium chloride 20 MEQ CR tablet  Commonly known as: K-DUR,KLOR-CON   20 mEq, Oral, Daily             Stop These Medications      azithromycin 250 MG tablet  Commonly known as: ZITHROMAX     metoprolol tartrate 100 MG tablet  Commonly known as: LOPRESSOR     sodium chloride 0.9 % nebulizer solution  Replaced by: sodium chloride 7 % nebulizer solution nebulizer  solution     torsemide 20 MG tablet  Commonly known as: DEMADEX              Discharge Diet:   Diet Instructions       Diet: Cardiac Diets; Healthy Heart (2-3 Na+); Regular Texture (IDDSI 7); Thin (IDDSI 0)      Discharge Diet: Cardiac Diets    Cardiac Diet: Healthy Heart (2-3 Na+)    Texture: Regular Texture (IDDSI 7)    Fluid Consistency: Thin (IDDSI 0)            Activity at Discharge:   Activity Instructions       Activity as Tolerated              Follow-up Appointments:  Future Appointments   Date Time Provider Department Center   12/28/2023 11:30 AM [facility] CT 1 BH [facility] E [facility]   1/2/2024 10:00 AM Marcia Joel APRN [facility]   4/25/2024  9:40 AM Marcie Robbins MD [facility]     Additional Instructions for the Follow-ups that You Need to Schedule       Ambulatory Referral to Home Health ([facility])   As directed      Face to Face Visit Date: 12/2/2023   Follow-up provider for Plan of Care?: I treated the patient in an acute care facility and will not continue treatment after discharge.   Follow-up provider: MATT ROSE [[id number]]   Reason/Clinical Findings: generalized weakness, CHF   Describe mobility limitations that make leaving home difficult: generalized weakness, CHF   Nursing/Therapeutic Services Requested: Physical Therapy Skilled Nursing   Skilled nursing orders: CHF management   PT orders: Strengthening   Frequency: 1 Week 1        Discharge Follow-up with PCP   As directed       Currently Documented PCP:    Matt Rose MD    PCP Phone Number:    [phone]     Follow Up Details: 1 week        Discharge Follow-up with Specified Provider: PJ Guy (Cardiology); 1 Week   As directed      To: PJ Guy (Cardiology)   Follow Up: 1 Week                   Karel Porras MD  12/02/23  16:50 EST    Time Spent on Discharge Activities: Greater than 30 minutes.

## 2023-12-02 NOTE — PROGRESS NOTES
"                                              LOS: 5 days   Patient Care Team:  Matt Rose MD as PCP - General (Family Medicine)  Marcie Robbins MD as Consulting Physician (Cardiology)  Nilesh Danielle MD as Consulting Physician (Urology)  Lina Morris [facility] as Pharmacist  Lev Mckeon, PharmD as Pharmacist (Pharmacy)  Rogelio Tucker MD as Consulting Physician (Pulmonary Disease)    Chief Complaint:  F/up COPD, organizing pneumonia and medical problems as below.    Subjective   Interval History      No dyspnea at rest but on exertion.  Still has mild nonproductive cough.  On RA.    REVIEW OF SYSTEMS:   CARDIOVASCULAR: No chest pain, chest pressure or chest discomfort. No palpitations or edema.       CONSTITUTIONAL: No fever or chills.     Ventilator/Non-Invasive Ventilation Settings (From admission, onward)      None                  Physical Exam:     Vital Signs  Temp:  [97.6 °F (36.4 °C)-98 °F (36.7 °C)] 97.9 °F (36.6 °C)  Heart Rate:  [] 99  Resp:  [18-20] 20  BP: (122-131)/(65-75) 126/69    Intake/Output Summary (Last 24 hours) at 12/2/2023 1543  Last data filed at 12/2/2023 1227  Gross per 24 hour   Intake 720 ml   Output --   Net 720 ml     Flowsheet Rows      Flowsheet Row First Filed Value   Admission Height 157.5 cm (62\") Documented at 11/26/2023 1835   Admission Weight 57.2 kg (126 lb) Documented at 11/26/2023 1835            PPE used per hospital policy    General Appearance:   Alert, cooperative, in no acute distress   ENMT:  Mallampati score 2, moist mucous membrane   Eyes:  Pupils equal and reactive to light. EOMI   Neck:   Trachea midline. No thyromegaly.   Lungs:   Mild bilateral crackles similar to yesterday's exam. No wheezing or labored breathing.  .    Heart:   Regular rhythm and normal rate, normal S1 and S2, no         murmur   Skin:   No rash or ecchymosis   Abdomen:     Soft. No tenderness. No HSM.   Neuro/psych:  Conscious, alert, oriented x3. Strength 5/5 in upper and " lower  ext.  Appropriate mood and affect   Extremities:  No cyanosis, clubbing or edema.  Warm extremities and well-perfused          Results Review:        Results from last 7 days   Lab Units 12/02/23  0401 12/01/23  1838 11/30/23  0946   SODIUM mmol/L 140 138 140   POTASSIUM mmol/L 3.4* 4.2 3.6   CHLORIDE mmol/L 103 101 102   CO2 mmol/L 27.2 26.0 26.9   BUN mg/dL 15 17 16   CREATININE mg/dL 0.78 1.04* 0.91   GLUCOSE mg/dL 117* 106* 106*   CALCIUM mg/dL 9.2 9.5 9.4     Results from last 7 days   Lab Units 11/26/23  1851   HSTROP T ng/L 25*     Results from last 7 days   Lab Units 12/02/23  0401 11/29/23  0307 11/28/23  0238   WBC 10*3/mm3 8.19 9.10 8.48   HEMOGLOBIN g/dL 10.2* 10.7* 10.4*   HEMATOCRIT % 30.7* 32.7* 32.1*   PLATELETS 10*3/mm3 301 268 291         Results from last 7 days   Lab Units 11/26/23  1851   PROBNP pg/mL 3,145.0*                           I reviewed the patient's new clinical results.        Medication Review:   apixaban, 2.5 mg, Oral, Q12H  atorvastatin, 40 mg, Oral, Nightly  budesonide-formoterol, 2 puff, Inhalation, BID - RT  carvedilol, 3.125 mg, Oral, BID With Meals  citalopram, 20 mg, Oral, Daily  digoxin, 125 mcg, Oral, Daily  furosemide, 80 mg, Oral, BID  guaiFENesin, 600 mg, Oral, BID  ipratropium-albuterol, 3 mL, Nebulization, Q6H - RT  potassium chloride, 20 mEq, Oral, Daily  senna-docusate sodium, 2 tablet, Oral, BID  sodium chloride, 10 mL, Intravenous, Q12H  sodium chloride, 4 mL, Nebulization, Q6H - RT             Diagnostic imaging:  I personally and independently reviewed the following images:  CXR 11/30/23: Cardiomegaly and vascular congestion. Fluid in the right fissure. Mild interstitial infiltrates. Similar to CXR 11/26    Assessment     Dyspnea, multifactorial.   Worsening cough  Acute on chronic HFpEF  Paroxysmal atrial fibrillation  Underlying bronchiectasis  Recent treatment for BOOP  Chronic KINA  COPD, no current exacerbation  Moderate MR  History of  aspergillosis  Hypokalemia        Plan       Symbicort 2 puffs twice daily  Duoneb to Q 6 h and add HS neb and CPT to improve mucous clearance  CHF management per cardiology: Lasix 80 mg BID. Coreg. today  Replace potassium  Anticoagulation: Eliquis 2.5 mg twice daily.  Mucinex 600 mg BID    Dispo: Okay to discharge home from pulmonary perspective.  She already has nebulizer treatment and vest which I encouraged her to continue as usual and follow-up with Dr. Tucker 2-4 weeks after she gets out of the hospital.    Sung King MD  12/02/23  15:43 EST            This note was dictated utilizing Dragon dictation

## 2023-12-02 NOTE — PLAN OF CARE
Goal Outcome Evaluation:           Progress: improving  Outcome Evaluation: no acute events overnight.

## 2023-12-02 NOTE — PROGRESS NOTES
HOSPITAL PROGRESS NOTE    Date of Service: 23  LOS:  LOS: 5 days   Patient Name: Agnieszka Rizzo  Age/Sex: 89 y.o. female  : 1930  MRN: [id number]  Primary Cardiologist: Dr. Marcie Robbins    Subjective:     Chief Complaint/Follow up:   CHF    Interval History:   Started oral furosemide 2 days ago.  Weight up 1 pound overnight.  Resting in bed.  Ready to go home.  Denies shortness of breath, chest pain, edema, or palpitations.  Continues to have chronic cough.    Objective:     Objective:  Temp:  [97.6 °F (36.4 °C)-98.6 °F (37 °C)] 97.6 °F (36.4 °C)  Heart Rate:  [] 85  Resp:  [18-20] 20  BP: (122-131)/(65-75) 131/71  Body mass index is 23.52 kg/m².    Intake/Output Summary (Last 24 hours) at 2023 1057  Last data filed at 2023 0823  Gross per 24 hour   Intake 720 ml   Output --   Net 720 ml         23  0631 23  0551 23  0610   Weight: 58.3 kg (128 lb 9.6 oz) 57.8 kg (127 lb 6.4 oz) 58.3 kg (128 lb 9.6 oz)       Physical Exam:   General Appearance: Alert, cooperative, in no acute distress. AAOx4.  Appears younger than stated age.  Wears glasses.  Neck: No JVD.  Lungs: Clear. Normal respiratory effort and rate.  Heart: Irregularly, irregular.  Normal S1 and S2, no murmurs, gallops or rubs.  Extremities: No edema.     Lab Review:   Results from last 7 days   Lab Units 23  0401 23  1838 23  0404 23  1851   SODIUM mmol/L 140 138   < > 142   POTASSIUM mmol/L 3.4* 4.2   < > 3.0*   CHLORIDE mmol/L 103 101   < > 101   CO2 mmol/L 27.2 26.0   < > 27.0   BUN mg/dL 15 17   < > 14   CREATININE mg/dL 0.78 1.04*   < > 0.99   GLUCOSE mg/dL 117* 106*   < > 116*   CALCIUM mg/dL 9.2 9.5   < > 9.4   AST (SGOT) U/L  --   --   --  22   ALT (SGPT) U/L  --   --   --  12    < > = values in this interval not displayed.     Results from last 7 days   Lab Units 23  1851   HSTROP T ng/L 25*     Results from last 7 days   Lab Units 23  0401 23  0302    WBC 10*3/mm3 8.19 9.10   HEMOGLOBIN g/dL 10.2* 10.7*   HEMATOCRIT % 30.7* 32.7*   PLATELETS 10*3/mm3 301 268         Results from last 7 days   Lab Units 11/26/23  1851   MAGNESIUM mg/dL 2.2         Results from last 7 days   Lab Units 11/26/23  1851   PROBNP pg/mL 3,145.0*           Results for orders placed during the hospital encounter of 10/04/23    Adult Transthoracic Echo Complete W/ Cont if Necessary Per Protocol    Interpretation Summary    Left ventricular systolic function is normal. Calculated left ventricular EF = 56%    Normal right ventricular cavity size and systolic function noted.    The left atrial cavity is severely dilated.    The right atrial cavity is severely dilated.    Mild to moderate mitral valve regurgitation is present.    Moderate tricuspid valve regurgitation is present.    Calculated right ventricular systolic pressure from tricuspid regurgitation is 49 mmHg.    There is a small (<1cm) circumferential pericardial effusion. There is no evidence of cardiac tamponade.    I reviewed the patient's new clinical results.  I personally viewed and interpreted the patient's EKG/Telemetry data/Labs/Test Results.     Current Medications:   Scheduled Meds:  apixaban, 2.5 mg, Oral, Q12H  atorvastatin, 40 mg, Oral, Nightly  budesonide-formoterol, 2 puff, Inhalation, BID - RT  carvedilol, 3.125 mg, Oral, BID With Meals  citalopram, 20 mg, Oral, Daily  digoxin, 125 mcg, Oral, Daily  furosemide, 80 mg, Oral, BID  guaiFENesin, 600 mg, Oral, BID  ipratropium-albuterol, 3 mL, Nebulization, Q6H - RT  potassium chloride, 20 mEq, Oral, Daily  potassium chloride ER, 40 mEq, Oral, Q4H  senna-docusate sodium, 2 tablet, Oral, BID  sodium chloride, 10 mL, Intravenous, Q12H  sodium chloride, 4 mL, Nebulization, Q6H - RT           Allergies:  Allergies   Allergen Reactions    Amlodipine Besylate Swelling    Aspirin GI Intolerance     Caused bleeding ulcers    Bactrim [Sulfamethoxazole-Trimethoprim] Nausea And  Vomiting    Erythromycin Unknown (See Comments)     Patient does not recall type of reaction.    Levaquin [Levofloxacin] Unknown (See Comments)     Nausea      Nitrofurantoin Nausea Only and Nausea And Vomiting    Ramipril Other (See Comments)     Cough       Assessment:       Acute on chronic diastolic congestive heart failure    Primary hypertension    Chronic coronary artery disease    Chronic respiratory failure with hypoxia    Iron deficiency anemia    Permanent atrial fibrillation    COPD (chronic obstructive pulmonary disease)        Plan:   Assessment & Plan       1.  Acute on chronic diastolic heart failure.  Normal EF.  Well compensated.  Continue oral furosemide.  2.  Mild to moderate MR, moderate TR, and moderate pulmonary hypertension per echocardiogram 10/2023.    3.  Hypertension.  4.  Permanent atrial fibrillation.  Rate controlled on carvedilol and digoxin.  Recommended to continue with 3.125 mg twice per day because when she was on the higher dosage she was hypotensive.  Continue apixaban.  5.  Coronary artery disease.  Cardiac cath 2007, stress test 2018.  6.  COPD/chronic hypoxic respiratory failure.  Recently treated for BOOP and chronic KINA.  Appreciate pulmonology following.  7.  Anemic.  8.  Hypokalemic.  Replace per potassium protocol.    -.  Okay to discharge from cardiac standpoint.  -   She will need a 1 week follow-up visit with PJ Guy in our office.      PJ Potter  [facility]   12/02/23  10:57 EST

## 2023-12-04 ENCOUNTER — HOME CARE VISIT (OUTPATIENT)
Dept: HOME HEALTH SERVICES | Facility: HOME HEALTHCARE | Age: 88
End: 2023-12-04

## 2023-12-04 ENCOUNTER — TRANSITIONAL CARE MANAGEMENT TELEPHONE ENCOUNTER (OUTPATIENT)
Dept: CALL CENTER | Facility: HOSPITAL | Age: 88
End: 2023-12-04
Payer: MEDICARE

## 2023-12-04 NOTE — OUTREACH NOTE
Call Center TCM Note      Flowsheet Row Responses   [facility] patient discharged from? [place]   Does the patient have one of the following disease processes/diagnoses(primary or secondary)? CHF   TCM attempt successful? Yes   Call start time 1522   Call end time 1539   Discharge diagnosis a/c CHF   Person spoke with today (if not patient) and relationship pt   Meds reviewed with patient/caregiver? Yes   Is the patient having any side effects they believe may be caused by any medication additions or changes? No   Does the patient have all medications ordered at discharge? Yes   Is the patient taking all medications as directed (includes completed medication regime)? Yes   Comments Cardiology 1/2/23   Does the patient have an appointment with their PCP within 7-14 days of discharge? Yes   What is the Home health agency?  [facility]   Has home health visited the patient within 72 hours of discharge? Yes   Psychosocial issues? No   Did the patient receive a copy of their discharge instructions? Yes   Nursing interventions Reviewed instructions with patient   What is the patient's perception of their health status since discharge? Improving   Nursing interventions Nurse provided patient education   Is the patient able to teach back signs and symptoms of worsening condition? (i.e. weight gain, shortness of air, etc.) Yes   If the patient is a current smoker, are they able to teach back resources for cessation? Not a smoker   Is the patient/caregiver able to teach back the hierarchy of who to call/visit for symptoms/problems? PCP, Specialist, Home health nurse, Urgent Care, ED, 911 Yes   Notified Case Management Education issues   Is the patient able to teach back Heart Failure Zones? Yes   CHF Zone this Call Green Zone   Green Zone Patient reports doing well, No new or worsening shortness of breath, No new swelling -  feet, ankles and legs look normal for you, No chest pain   Green Zone Interventions Daily weight  check, Meds as directed, Low sodium diet, Follow up visits planned   TCM call completed? Yes   Wrap up additional comments Pt states she is doing better, and no worsening SOB. Reviewed AVS/meds with dtr. Dtr verified PCP/cardiology fu appts   Call end time [id number]   Would this patient benefit from a Referral to [facility] Social Work? No   Is the patient interested in additional calls from an ambulatory ? No             Lena Dozier RN    12/4/2023, 15:41 EST

## 2023-12-05 ENCOUNTER — HOME CARE VISIT (OUTPATIENT)
Dept: HOME HEALTH SERVICES | Facility: HOME HEALTHCARE | Age: 88
End: 2023-12-05
Payer: MEDICARE

## 2023-12-05 PROCEDURE — G0299 HHS/HOSPICE OF RN EA 15 MIN: HCPCS

## 2023-12-06 ENCOUNTER — HOME CARE VISIT (OUTPATIENT)
Dept: HOME HEALTH SERVICES | Facility: HOME HEALTHCARE | Age: 88
End: 2023-12-06
Payer: MEDICARE

## 2023-12-06 VITALS
HEIGHT: 63 IN | SYSTOLIC BLOOD PRESSURE: 124 MMHG | BODY MASS INDEX: 21.62 KG/M2 | DIASTOLIC BLOOD PRESSURE: 62 MMHG | HEART RATE: 88 BPM | OXYGEN SATURATION: 98 % | RESPIRATION RATE: 18 BRPM | TEMPERATURE: 97.5 F | WEIGHT: 122 LBS

## 2023-12-06 VITALS
SYSTOLIC BLOOD PRESSURE: 130 MMHG | DIASTOLIC BLOOD PRESSURE: 62 MMHG | HEART RATE: 85 BPM | OXYGEN SATURATION: 95 % | TEMPERATURE: 95 F

## 2023-12-06 PROCEDURE — G0151 HHCP-SERV OF PT,EA 15 MIN: HCPCS

## 2023-12-06 NOTE — HOME HEALTH
Eval Note  Patient was admitted to [facility] 11/26 to 12/2/23 with exacerbation of CHF  PMHx COPD, A fib, anemia, respiratory failure, with hypoxia, HTN, CAD, bronchiectasis  SHx Patient resides alone, has paid caregiver 2x/week. Caregiver helps with laundry, cleaning. Daughter takes her to MD appointments.   Prior level of function  Patient ambulates with rolling walker, independent with ADLs.     Patient's goal to get stronger     Services required to achieve goals: SN, PT evaluation only     Potential Issues for goal attainment: none    Problems identified:none     Describe the Functional status and safety:Patient is ambulating independently with rollling walker, is independent with transfers, is independent with HEP     Describe any environmental issues:none     Any equipment needs none

## 2023-12-06 NOTE — HOME HEALTH
"SOC Note: Reviewed hospital DC instructions with patient. Instructed on med changes and compliance with times and doses of med reg with theraputic effect. Instructed to ambulate with walker and assist x 1. Instructed to continue to use incentive spirometer, cough and deep breathing exercises 4 times daily for 5-7 days. Patient able to voice back understanding on instructions provided.    Home Health ordered for: SN 2w3, 1w3, 2PRN, PT services    Reason for Hosp/Primary Dx/Co-morbidities: Admission to [facility]: 11/26/2023 - 12/2/2023 Discharge Diagnosis: Acute on chronic diastolic congestive heart failure, COPD (chronic obstructive pulmonary disease), Permanent atrial fibrillation, Iron deficiency anemia, Chronic respiratory failure with hypoxia, Primary hypertension, Chronic coronary artery disease.     Focus of Care: CHF with newly changed meds on Coreg and Dig, monitoring HR and WT for CHF exac prevention    Patient's goal(s): \" To get stronger\"    Current Functional status/mobility/DME: Amb with walker    HB status/Living Arrangements: Lives alone with daughter stopping in daily    Skin Integrity/wound status: Skin intact    Code Status: DNR    Fall Risk/Safety concerns: High    Medication issues/Concerns: Added: atorvastatin (LIPITOR) 40 MG tablet Take 1 tablet by mouth Every Night. Added: carvedilol (COREG) 3.125 MG tablet Take 1 tablet by mouth 2 (Two) Times a Day With Meals. Added: digoxin (LANOXIN) 125 MCG tablet Take 1 tablet by mouth Daily. Added: furosemide (LASIX) 80 MG tablet Take 1 tablet by mouth 2 (Two) Times a Day. Added: sodium chloride 7 % nebulizer solution nebulizer solution Take 4 mL by nebulization 2 (Two) Times a Day. - Nebulization     Additional Problems/Concerns: None    SDOH Barriers (i.e. caregiver concerns, social isolation, transportation, food insecurity, environment, income etc.)/Need for MSW: None    Plan for next visit: CP assess, med compliance with med changes, falls prevention, goal " based teaching

## 2023-12-07 ENCOUNTER — HOSPITAL ENCOUNTER (OUTPATIENT)
Dept: CARDIOLOGY | Facility: HOSPITAL | Age: 88
Discharge: HOME | End: 2023-12-07
Admitting: NURSE PRACTITIONER
Payer: MEDICARE

## 2023-12-07 ENCOUNTER — OFFICE VISIT (OUTPATIENT)
Age: 88
End: 2023-12-07
Payer: MEDICARE

## 2023-12-07 VITALS
HEART RATE: 86 BPM | DIASTOLIC BLOOD PRESSURE: 80 MMHG | BODY MASS INDEX: 21.62 KG/M2 | SYSTOLIC BLOOD PRESSURE: 130 MMHG | OXYGEN SATURATION: 95 % | HEIGHT: 63 IN | WEIGHT: 122 LBS

## 2023-12-07 DIAGNOSIS — I48.21 PERMANENT ATRIAL FIBRILLATION: ICD-10-CM

## 2023-12-07 DIAGNOSIS — I50.32 CHRONIC DIASTOLIC CHF (CONGESTIVE HEART FAILURE): ICD-10-CM

## 2023-12-07 DIAGNOSIS — I10 BENIGN ESSENTIAL HYPERTENSION: ICD-10-CM

## 2023-12-07 DIAGNOSIS — I34.0 NONRHEUMATIC MITRAL VALVE REGURGITATION: ICD-10-CM

## 2023-12-07 DIAGNOSIS — I25.10 CHRONIC CORONARY ARTERY DISEASE: Primary | ICD-10-CM

## 2023-12-07 DIAGNOSIS — I50.33 ACUTE ON CHRONIC DIASTOLIC HEART FAILURE: ICD-10-CM

## 2023-12-07 LAB
ANION GAP SERPL CALCULATED.3IONS-SCNC: 12.1 MMOL/L (ref 5–15)
BUN SERPL-MCNC: 12 MG/DL (ref 8–23)
BUN/CREAT SERPL: 13 (ref 7–25)
CALCIUM SPEC-SCNC: 9.5 MG/DL (ref 8.2–9.6)
CHLORIDE SERPL-SCNC: 101 MMOL/L (ref 98–107)
CO2 SERPL-SCNC: 26.9 MMOL/L (ref 22–29)
CREAT SERPL-MCNC: 0.92 MG/DL (ref 0.57–1)
DIGOXIN SERPL-MCNC: 0.8 NG/ML (ref 0.6–1.2)
EGFRCR SERPLBLD CKD-EPI 2021: 58.2 ML/MIN/1.73
GLUCOSE SERPL-MCNC: 94 MG/DL (ref 65–99)
NT-PROBNP SERPL-MCNC: 4003 PG/ML (ref 0–1800)
POTASSIUM SERPL-SCNC: 3.7 MMOL/L (ref 3.5–5.2)
SODIUM SERPL-SCNC: 140 MMOL/L (ref 136–145)

## 2023-12-07 PROCEDURE — 83880 ASSAY OF NATRIURETIC PEPTIDE: CPT | Performed by: NURSE PRACTITIONER

## 2023-12-07 PROCEDURE — 80048 BASIC METABOLIC PNL TOTAL CA: CPT | Performed by: NURSE PRACTITIONER

## 2023-12-07 PROCEDURE — 80162 ASSAY OF DIGOXIN TOTAL: CPT | Performed by: NURSE PRACTITIONER

## 2023-12-07 PROCEDURE — 36415 COLL VENOUS BLD VENIPUNCTURE: CPT

## 2023-12-07 PROCEDURE — 96374 THER/PROPH/DIAG INJ IV PUSH: CPT

## 2023-12-07 RX ORDER — BUMETANIDE 0.25 MG/ML
2 INJECTION INTRAMUSCULAR; INTRAVENOUS ONCE
Status: COMPLETED | OUTPATIENT
Start: 2023-12-07 | End: 2023-12-07

## 2023-12-07 RX ADMIN — BUMETANIDE 2 MG: 0.25 INJECTION INTRAMUSCULAR; INTRAVENOUS at 10:13

## 2023-12-07 NOTE — PROGRESS NOTES
CARDIOLOGY        Patient Name: Agnieszka Rizzo  :1930  Age: 89 y.o.  Primary Cardiologist: Marcie Robbins MD  Encounter Provider:  PJ Santillan    Date of Service: 23      CHIEF COMPLAINT / REASON FOR OFFICE VISIT     Coronary Artery Disease      HISTORY OF PRESENT ILLNESS       Coronary Artery Disease  Symptoms include shortness of breath. Pertinent negatives include no leg swelling or weight gain.     Agnieszka Rizzo is a 89 y.o. female who presents today for hospital follow-up.     Pt has a  history significant for PAF, diastolic heart failure, hypertension, chronic CAD.    Review of medical records reveals patient hospitalized with discharge date 2023.  Patient was admitted with dyspnea.  She was found to have acute on chronic diastolic heart failure.  Patient was treated with IV diuretics and symptoms improved quickly.  Home diuretic regimen was increased.  She presents today for follow-up.    Review of medical records reveals that patient was hospitalized with discharge date 2023.  Patient was found to have a CHF exacerbation and was started on diuretics.  Pulmonary issues complicated this and she was followed by both cardiology and pulmonology.  Patient did have rate control issues as well which improved with medication changes at time of discharge.  Patient was discharged with carvedilol, digoxin, furosemide.    Patient presents today with her daughter.  Reports that she is feeling better however she is starting to gradually have recurring symptoms of shortness of breath.  She also notes that she is having a cough that is not improved with nebulized treatments.  Reports abdominal bloating and notes a weight gain of 2-3 pounds.  She feels that she is getting short of breath with minimal exertion.      The following portions of the patient's history were reviewed and updated as appropriate: allergies, current medications, past family history, past medical history, past social  "history, past surgical history and problem list.      VITAL SIGNS     Visit Vitals  /80 (BP Location: Left arm, Patient Position: Sitting)   Pulse 86   Ht 160 cm (63\")   Wt 55.3 kg (122 lb)   SpO2 95%   BMI 21.61 kg/m²         Wt Readings from Last 3 Encounters:   12/07/23 55.3 kg (122 lb)   12/05/23 55.3 kg (122 lb)   12/02/23 58.3 kg (128 lb 9.6 oz)     Body mass index is 21.61 kg/m².      REVIEW OF SYSTEMS   Review of Systems   Constitutional: Positive for malaise/fatigue. Negative for chills, fever, weight gain and weight loss.   Cardiovascular:  Positive for dyspnea on exertion. Negative for leg swelling.   Respiratory:  Positive for shortness of breath. Negative for cough, snoring and wheezing.    Hematologic/Lymphatic: Negative for bleeding problem. Does not bruise/bleed easily.   Skin:  Negative for color change.   Musculoskeletal:  Negative for falls, joint pain and myalgias.   Gastrointestinal:  Positive for bloating. Negative for melena.   Genitourinary:  Negative for hematuria.   Neurological:  Positive for loss of balance and weakness. Negative for excessive daytime sleepiness.   Psychiatric/Behavioral:  Negative for depression. The patient is not nervous/anxious.            PHYSICAL EXAMINATION     Vitals and nursing note reviewed.   Constitutional:       Appearance: Normal appearance. Well-developed.   Eyes:      Conjunctiva/sclera: Conjunctivae normal.   Neck:      Vascular: No carotid bruit.   Pulmonary:      Breath sounds: Normal breath sounds.      Comments: Crackles to bilateral bases  Cardiovascular:      Normal rate. Irregularly irregular rhythm. Normal S1 with normal intensity. Normal S2 with normal intensity.       Murmurs: There is no murmur.      No gallop.  No click. No rub.   Edema:     Peripheral edema absent.   Musculoskeletal: Normal range of motion. Skin:     General: Skin is warm and dry.   Neurological:      Mental Status: Alert and oriented to person, place, and time.      " GCS: GCS eye subscore is 4. GCS verbal subscore is 5. GCS motor subscore is 6.   Psychiatric:         Speech: Speech normal.         Behavior: Behavior normal.         Thought Content: Thought content normal.         Judgment: Judgment normal.           REVIEWED DATA       ECG 12 Lead    Date/Time: 12/7/2023 9:24 AM  Performed by: Marcia Joel APRN    Authorized by: Marcia Joel APRN  Comparison: compared with previous ECG from 11/27/2023  Similar to previous ECG  Rhythm: atrial fibrillation  Rate: normal  BPM: 81  Conduction: conduction normal  QRS axis: normal  Other findings: non-specific ST-T wave changes    Clinical impression: abnormal EKG          Cardiac Procedures:  Echocardiogram 7/12/2022.  LVEF 55%.  Moderate hypertrophy.  Left atrial volume is moderately increased.  Right atrial cavity is severely dilated.  Moderate mitral valve regurgitation.  Moderate tricuspid valve regurgitation.  Calculated RVSP 31 mmHg.  Zio monitor 1/25/2023.  Noted to be in persistent atrial fibrillation.  Echocardiogram 10/6/2023.  LVEF 56%.  Normal RV cavity size and systolic function.  Left atrial cavity is severely dilated.  Right atrial cavity is severely dilated.  Mild to moderate mitral valve regurgitation.  Moderate tricuspid valve regurgitation.  Small, less than 1 cm circumferential pericardial effusion.  No evidence of tamponade.        Lab Results   Component Value Date     12/02/2023     12/01/2023    K 3.4 (L) 12/02/2023    K 4.2 12/01/2023     12/02/2023     12/01/2023    CO2 27.2 12/02/2023    CO2 26.0 12/01/2023    BUN 15 12/02/2023    BUN 17 12/01/2023    CREATININE 0.78 12/02/2023    CREATININE 1.04 (H) 12/01/2023    EGFRIFNONA 67 08/27/2021    EGFRIFNONA 60 (L) 03/04/2021    EGFRIFAFRI 87 11/14/2019    EGFRIFAFRI 96 06/20/2017    GLUCOSE 117 (H) 12/02/2023    GLUCOSE 106 (H) 12/01/2023    CALCIUM 9.2 12/02/2023    CALCIUM 9.5 12/01/2023    PROTENTOTREF 6.2 10/10/2023     ALBUMIN 3.9 11/26/2023    ALBUMIN 3.1 10/10/2023    BILITOT 0.3 11/26/2023    BILITOT 0.5 10/08/2023    AST 22 11/26/2023    AST 44 (H) 10/08/2023    ALT 12 11/26/2023    ALT 34 (H) 10/08/2023     Lab Results   Component Value Date    WBC 8.19 12/02/2023    WBC 9.10 11/29/2023    HGB 10.2 (L) 12/02/2023    HGB 10.7 (L) 11/29/2023    HCT 30.7 (L) 12/02/2023    HCT 32.7 (L) 11/29/2023    MCV 96.8 12/02/2023    MCV 97.9 (H) 11/29/2023     12/02/2023     11/29/2023     Lab Results   Component Value Date    PROBNP 3,145.0 (H) 11/26/2023    PROBNP 8,193.0 (H) 10/04/2023     Lab Results   Component Value Date    CKTOTAL 66 02/08/2017    CKMB 2.98 02/08/2017    TROPONINT 25 (H) 11/26/2023     Lab Results   Component Value Date    TSH 3.100 08/27/2021    TSH 1.630 05/31/2017             ASSESSMENT & PLAN     Diagnoses and all orders for this visit:    1. Chronic coronary artery disease (Primary)  Assessment & Plan:  Denies angina  Continue GDMT  Atorvastatin  carvedilol    Orders:  -     ECG 12 Lead    2. Chronic diastolic CHF (congestive heart failure)  Assessment & Plan:  Patient with recent hospitalization for COPD exacerbation as well as diastolic heart failure exacerbation  Patient was originally feeling better but now is having RAMOS with minimal exertion, cough that is not improved with nebulized treatments, abdominal swelling, increased fatigue, weight gain of 2-3 pounds  [place] [facility] [place]oxin level, BNP, BMP, IV Bumex  At time of dictation patient's laboratory studies have resulted.  proBNP is starting to gradually uptitrate.  Patient has been on torsemide in the past and this was not found to be effective.  She is on high-dose furosemide without improvement of symptoms.  Recommend transitioning to Bumex 2 mg/day.  If this does not improve her symptoms would increase to twice daily dosing.  Patient will follow-up in 1 week for reevaluation.    Orders:  -     ECG 12 Lead  -     Basic  Metabolic Panel; Future  -     Digoxin Level; Future  -     BNP; Future  -     bumetanide (BUMEX) injection 2 mg    3. Permanent atrial fibrillation  Assessment & Plan:  Patient had issues with rate control at recent hospitalization  Rate controlled in clinic on ECG  Continue the following:   Digoxin 125 mcg per day, will add a Digoxin level to be drawn  Carvedilol 3.125 mg twice daily  Apixiban 2.5 mg twice daily    CHADS-VASc Risk Assessment              5 Total Score    1 CHF    1 Hypertension    2 Age >/= 75    1 Sex: Female        Criteria that do not apply:    DM    PRIOR STROKE/TIA/THROMBO    Vascular Disease    Age 65-74              Orders:  -     ECG 12 Lead    4. Benign essential hypertension    5. Nonrheumatic mitral valve regurgitation  Assessment & Plan:  Mild to moderate on echo 10/2023      6. Acute on chronic diastolic heart failure  Assessment & Plan:  Patient with recent hospitalization for COPD exacerbation as well as diastolic heart failure exacerbation  Patient was originally feeling better but now is having RAMOS with minimal exertion, cough that is not improved with nebulized treatments, abdominal swelling, increased fatigue, weight gain of 2-3 pounds  Will send patient to [facility] to obtain digoxin level, BNP, BMP, IV Bumex  At time of dictation patient's laboratory studies have resulted.  proBNP is starting to gradually uptitrate.  Patient has been on torsemide in the past and this was not found to be effective.  She is on high-dose furosemide without improvement of symptoms.  Recommend transitioning to Bumex 2 mg/day.  If this does not improve her symptoms would increase to twice daily dosing.  Patient will follow-up in 1 week for reevaluation.          Return in about 2 weeks (around 12/21/2023) for PJ Guy.    Future Appointments         Provider Department Center    12/8/2023 1:30 PM Alireza Beyer RN [facility] HOME CARE [facility]     12/12/2023 TBD Alireza Beyer RN [facility] HOME CARE [facility]      12/13/2023 11:00 AM Matt Rose MD [facility] PRIMARY CARE [facility]    12/15/2023 Alireza Goodwin RN [facility] HOME CARE [facility]     12/19/2023 Alireza Goodwin RN [facility] HOME CARE [facility]     12/19/2023 10:00 AM Marcia Joel APRN [facility] CARDIOLOGY [facility]    12/22/2023 Alireza Goodwin RN [facility] HOME CARE [facility]     12/26/2023 Alireza Goodwin RN [facility] HOME CARE [facility]     12/28/2023 11:30 AM [facility] CT 1 [facility] CT UCE    1/2/2024 Alireza Goodwin RN [facility] HOME CARE [facility]     1/2/2024 10:00 AM Marcia Joel APRN [facility] CARDIOLOGY [facility]    1/9/2024 Alireza Goodwin RN [facility] HOME CARE [facility]     4/25/2024 9:40 AM Marcie Robbins MD [facility] CARDIOLOGY [facility]                  MEDICATIONS         Discharge Medications            Accurate as of December 7, 2023  9:17 AM. If you have any questions, ask your nurse or doctor.                Changes to Medications        Instructions Start Date   FeroSul 325 (65 Fe) MG tablet  Generic drug: ferrous sulfate  What changed:   how much to take  when to take this   TAKE ONE TABLET BY MOUTH DAILY WITH BREAKFAST             Continue These Medications        Instructions Start Date   acetaminophen 325 MG tablet  Commonly known as: TYLENOL   2 tablets, Oral, Every 4 Hours PRN      albuterol sulfate  (90 Base) MCG/ACT inhaler  Commonly known as: PROVENTIL HFA;VENTOLIN HFA;PROAIR HFA   2 puffs, Inhalation, Every 6 Hours PRN      apixaban 2.5 MG tablet tablet  Commonly known as: ELIQUIS   2.5 mg, Oral, Every 12 Hours Scheduled      atorvastatin 40 MG tablet  Commonly known as: LIPITOR   40 mg, Oral, Nightly      benzonatate 200 MG capsule  Commonly known as: TESSALON   1 capsule, Oral, 3 Times Daily PRN      carvedilol 3.125 MG tablet  Commonly known as: COREG   3.125 mg, Oral, 2 Times Daily With Meals      cholecalciferol 25 MCG (1000 UT) tablet  Commonly known as: VITAMIN D3   1 tablet, Oral, Daily       citalopram 20 MG tablet  Commonly known as: CeleXA   20 mg, Oral, Daily      digoxin 125 MCG tablet  Commonly known as: LANOXIN   125 mcg, Oral, Daily Digoxin      fexofenadine 180 MG tablet  Commonly known as: ALLEGRA   1 tablet, Oral, Daily      Florajen Acidophilus capsule   1 tablet, Oral, Daily      fluticasone 50 MCG/ACT nasal spray  Commonly known as: FLONASE   2 sprays, Nasal, Nightly PRN      fluticasone-salmeterol 115-21 MCG/ACT inhaler  Commonly known as: ADVAIR HFA   2 puffs, Inhalation, 2 Times Daily - RT      furosemide 80 MG tablet  Commonly known as: LASIX   80 mg, Oral, 2 Times Daily      guaiFENesin 600 MG 12 hr tablet  Commonly known as: MUCINEX   1 tablet, Oral, 2 Times Daily      ipratropium-albuterol 0.5-2.5 mg/3 ml nebulizer  Commonly known as: DUO-NEB   3 mL, Nebulization, 2 Times Daily      multivitamin with minerals tablet tablet   1 tablet, Oral, 2 Times Daily      O2  Commonly known as: OXYGEN   2 L/min, Inhalation, Nightly      potassium chloride 20 MEQ CR tablet  Commonly known as: K-DUR,KLOR-CON   20 mEq, Oral, Daily      sodium chloride 7 % nebulizer solution nebulizer solution   4 mL, Nebulization, 2 Times Daily                   **Dragon Disclaimer:   Much of this encounter note is an electronic transcription/translation of spoken language to printed text. The electronic translation of spoken language may permit erroneous, or at times, nonsensical words or phrases to be inadvertently transcribed. Although I have reviewed the note for such errors, some may still exist.

## 2023-12-07 NOTE — CASE COMMUNICATION
Patient seen for physical therapy evaluation only, patient is ambulating independently with rolling walker, is independent with transfers, demonstrated independence with HEP.

## 2023-12-07 NOTE — ASSESSMENT & PLAN NOTE
Patient had issues with rate control at recent hospitalization  Rate controlled in clinic on ECG  Continue the following:   Digoxin 125 mcg per day, will add a Digoxin level to be drawn  Carvedilol 3.125 mg twice daily  Apixiban 2.5 mg twice daily    CHADS-VASc Risk Assessment              5 Total Score    1 CHF    1 Hypertension    2 Age >/= 75    1 Sex: Female        Criteria that do not apply:    DM    PRIOR STROKE/TIA/THROMBO    Vascular Disease    Age 65-74

## 2023-12-08 ENCOUNTER — HOME CARE VISIT (OUTPATIENT)
Dept: HOME HEALTH SERVICES | Facility: HOME HEALTHCARE | Age: 88
End: 2023-12-08
Payer: MEDICARE

## 2023-12-08 ENCOUNTER — TELEPHONE (OUTPATIENT)
Dept: CARDIOLOGY | Facility: CLINIC | Age: 88
End: 2023-12-08
Payer: MEDICARE

## 2023-12-08 PROBLEM — I50.33 ACUTE ON CHRONIC DIASTOLIC HEART FAILURE: Status: ACTIVE | Noted: 2017-03-12

## 2023-12-08 PROCEDURE — G0299 HHS/HOSPICE OF RN EA 15 MIN: HCPCS

## 2023-12-08 RX ORDER — BUMETANIDE 2 MG/1
2 TABLET ORAL DAILY
Qty: 90 TABLET | Refills: 3 | Status: SHIPPED | OUTPATIENT
Start: 2023-12-08

## 2023-12-08 NOTE — HOME HEALTH
"routine visit note- pt saw cardiology since last snv w/ change of diuretic to Bumex , pt reports she has not had strong response to any of the diuretics, pt is incont of uinre- sn instuct skin care,  meds reviewed- med list update, daughter sets up med planner, lung sounds w/ bilat rales, pt w/ distended abd firm she reports this is where she carries her fluid, bowel function normal, poor appetite but earting, new 'cough\" med per pulm rx is phenagren.    Home Health ordered for: SN 2w3, 1w3, 2PRN, PT services    Reason for Hosp/Primary Dx/Co-morbidities: Admission to [facility]: 11/26/2023 - 12/2/2023 Discharge Diagnosis: Acute on chronic diastolic congestive heart failure, COPD (chronic obstructive pulmonary disease), Permanent atrial fibrillation, Iron deficiency anemia, Chronic respiratory failure with hypoxia, Primary hypertension, Chronic coronary artery disease.     Focus of Care: CHF with newly changed meds on Coreg and Dig, monitoring HR and WT for CHF exac prevention    Plan for next visit: CP assess, med compliance with med changes, falls prevention, goal based teaching    12/13- pcp Dr Rose  12/14- pulmonary MD  12/19- Wisam OLIVA"

## 2023-12-08 NOTE — ASSESSMENT & PLAN NOTE
Patient with recent hospitalization for COPD exacerbation as well as diastolic heart failure exacerbation  Patient was originally feeling better but now is having RAMOS with minimal exertion, cough that is not improved with nebulized treatments, abdominal swelling, increased fatigue, weight gain of 2-3 pounds  [place] [facility] [place] [place], BNP, BMP, IV Bumex  At time of dictation patient's laboratory studies have resulted.  proBNP is starting to gradually uptitrate.  Patient has been on torsemide in the past and this was not found to be effective.  She is on high-dose furosemide without improvement of symptoms.  Recommend transitioning to Bumex 2 mg/day.  If this does not improve her symptoms would increase to twice daily dosing.  Patient will follow-up in 1 week for reevaluation.

## 2023-12-08 NOTE — TELEPHONE ENCOUNTER
Patient's daughter, Michelle, called back to go over plan.     I let her know we want to stop the furosemide (lasix) and start bumetanide (bumex) 2 mg tablet, 1 tablet daily.  She should take this in the morning.    She is aware of her scheduled follow up 12/19 with [facility].  She verbalizes understanding of  the plan.  I asked her to call us with any concerns.    Maylin Carson RN  [facility] Triage  12/08/23 11:27 EST

## 2023-12-08 NOTE — TELEPHONE ENCOUNTER
Notified patient of results/recommendations. Patient verbalized understanding. She requests the Bumex be sent to her local [facility] pharmacy on file.    Kasia Shore RN  Triage [facility]

## 2023-12-08 NOTE — PROGRESS NOTES
Can you please let patient and her daughter know that her BNP is showing signs of her heart failure worsening.  Her renal function and electrolytes are normal.  I have recommended that she increase torsemide to 60 mg in the morning and 40 mg in the afternoon.  I have sent in an updated prescription.  Keep scheduled repeat follow-up with me in 2 weeks.

## 2023-12-08 NOTE — TELEPHONE ENCOUNTER
----- Message from PJ Santillan sent at 12/8/2023  9:15 AM EST -----  Please clarify below message.  Patient is actually on furosemide.  I would like to transition her to Bumex 2 mg once daily.  I will send in the correct prescription.      ----- Message -----  From: Marcia Joel APRN  Sent: 12/8/2023   9:09 AM EST  To: [facility] [place] Triage Nurses Pool    Can you please let patient and her daughter know that her BNP is showing signs of her heart failure worsening.  Her renal function and electrolytes are normal.  I have recommended that she increase torsemide to 60 mg in the morning and 40 mg in the afternoon.  I have sent in an updated prescription.  Keep scheduled repeat follow-up with me in 2 weeks.

## 2023-12-09 VITALS
HEART RATE: 65 BPM | RESPIRATION RATE: 16 BRPM | SYSTOLIC BLOOD PRESSURE: 128 MMHG | BODY MASS INDEX: 23.03 KG/M2 | TEMPERATURE: 98.4 F | OXYGEN SATURATION: 96 % | WEIGHT: 130 LBS | DIASTOLIC BLOOD PRESSURE: 64 MMHG

## 2023-12-12 ENCOUNTER — READMISSION MANAGEMENT (OUTPATIENT)
Dept: CALL CENTER | Facility: HOSPITAL | Age: 88
End: 2023-12-12
Payer: MEDICARE

## 2023-12-12 ENCOUNTER — HOME CARE VISIT (OUTPATIENT)
Dept: HOME HEALTH SERVICES | Facility: HOME HEALTHCARE | Age: 88
End: 2023-12-12
Payer: MEDICARE

## 2023-12-12 PROCEDURE — G0493 RN CARE EA 15 MIN HH/HOSPICE: HCPCS

## 2023-12-12 NOTE — HH RPM NOTES
routine visit note- pt REPORTS NOT FEELING WELL LAST 3 DAYS, STATES SHE FEELS LIKE SHE IS COMING DOWN WITH SOMETHING, VS TODAY= AFEBRILE, VS WNL, LUNG SOUNDS CLEAR UPPER, RALES LOWER, WEIGHT STABLE/UNCHANGED SINCE LAST SNV W/ COMPLIANCE W/ BUMEX DAILY. upon leaving SNV daughter approached SN saying PCP mentioned something to her about considering Hospice- SN insturct daughter to ask about Paliative care for long term CHF/COPD management- daughter states she will ask    Home Health ordered for: SN 2w3, 1w3, 2PRN, PT services    Reason for Hosp/Primary Dx/Co-morbidities: Admission to [facility]: 11/26/2023 - 12/2/2023 Discharge Diagnosis: Acute on chronic diastolic congestive heart failure, COPD (chronic obstructive pulmonary disease), Permanent atrial fibrillation, Iron deficiency anemia, Chronic respiratory failure with hypoxia, Primary hypertension, Chronic coronary artery disease.     Focus of Care: CHF with newly changed meds on Coreg and Dig, monitoring HR and WT for CHF exac prevention    Plan for next visit: FOLLOW UP CHANGE ORDERS FROM MD APPTS,  CP assess, med compliance with med changes, falls prevention, goal based teaching    12/13- pcp Dr Rose  12/14- pulmonary MD  12/19- Wisam OLIVA

## 2023-12-12 NOTE — OUTREACH NOTE
"CHF Week 2 Survey      Flowsheet Row Responses   [facility] [place] patient discharged from? [place]   Does the patient have one of the following disease processes/diagnoses(primary or secondary)? CHF   Week 2 attempt successful? Yes   Call start time 1435   Call end time 1439   Discharge diagnosis a/c CHF   Meds reviewed with patient/caregiver? Yes   Is the patient taking all medications as directed (includes completed medication regime)? Yes   Does the patient have a primary care provider?  Yes   Has the patient kept scheduled appointments due by today? Yes  [Cards apt since hosp dc]   Comments PCP apt 12/13/23   What is the Home health agency?  [facility]   Home health comments  still visits   Pulse Ox monitoring Intermittent   Pulse Ox device source Patient   O2 Sat comments wears oxygen at HS   O2 Sat: education provided Sat levels, Monitoring frequency, When to seek care   What is the patient's perception of their health status since discharge? New symptoms unrelated to diagnosis  [\"Just feeling yucky\" per pt (no energy/coughing/bloating)-pt has a PCP apt on 12/13/23]   CHF Week 2 call completed? Yes   Call end time 1439            Noemi H - Registered Nurse  "

## 2023-12-13 ENCOUNTER — OFFICE VISIT (OUTPATIENT)
Dept: INTERNAL MEDICINE | Facility: CLINIC | Age: 88
End: 2023-12-13
Payer: MEDICARE

## 2023-12-13 VITALS
WEIGHT: 132.9 LBS | OXYGEN SATURATION: 96 % | BODY MASS INDEX: 23.55 KG/M2 | HEART RATE: 62 BPM | SYSTOLIC BLOOD PRESSURE: 114 MMHG | TEMPERATURE: 97.3 F | HEIGHT: 63 IN | DIASTOLIC BLOOD PRESSURE: 56 MMHG

## 2023-12-13 VITALS
RESPIRATION RATE: 16 BRPM | SYSTOLIC BLOOD PRESSURE: 140 MMHG | OXYGEN SATURATION: 95 % | DIASTOLIC BLOOD PRESSURE: 60 MMHG | WEIGHT: 130 LBS | BODY MASS INDEX: 23.03 KG/M2 | HEART RATE: 78 BPM

## 2023-12-13 DIAGNOSIS — J41.0 SIMPLE CHRONIC BRONCHITIS: ICD-10-CM

## 2023-12-13 DIAGNOSIS — I50.33 ACUTE ON CHRONIC DIASTOLIC HEART FAILURE: Primary | ICD-10-CM

## 2023-12-13 NOTE — PROGRESS NOTES
"Chief Complaint  Congestive Heart Failure and Hospital Follow Up Visit (Discharged on 11/02)    Subjective        Agnieszka Rizzo presents to [facility]  History of Present Illness  he was found to have a CHF exacerbation and was started on diuretics. Her pulmonary issues complicated this and she was followed by cardiology and pulmonology. She had some rate control issues as well which improved with the medication   New medications on discharge atorvastatin carvedilol digoxin furosemide  Objective   Vital Signs:  /56   Pulse 62   Temp 97.3 °F (36.3 °C)   Ht 160 cm (62.99\")   Wt 60.3 kg (132 lb 14.4 oz)   SpO2 96%   BMI 23.55 kg/m²   Estimated body mass index is 23.55 kg/m² as calculated from the following:    Height as of this encounter: 160 cm (62.99\").    Weight as of this encounter: 60.3 kg (132 lb 14.4 oz).       BMI is within normal parameters. No other follow-up for BMI required.      Physical Exam  Vitals and nursing note reviewed.   HENT:      Head: Normocephalic and atraumatic.   Cardiovascular:      Rate and Rhythm: Normal rate.      Heart sounds: Normal heart sounds.   Pulmonary:      Effort: Pulmonary effort is normal.      Comments: Decreased breath sounds basilar crackles no wheezes  Abdominal:      Tenderness: There is no right CVA tenderness or left CVA tenderness.      Comments: Nontender    Musculoskeletal:      Right lower leg: No edema.      Left lower leg: No edema.   Skin:     General: Skin is warm and dry.   Neurological:      Mental Status: She is alert.   Psychiatric:         Mood and Affect: Mood normal.         Behavior: Behavior normal.         Thought Content: Thought content normal.         Judgment: Judgment normal.        Result Review :    Common labs          12/1/2023    18:38 12/2/2023    04:01 12/7/2023    09:57   Common Labs   Glucose 106  117  94    BUN 17  15  12    Creatinine 1.04  0.78  0.92    Sodium 138  140  140    Potassium 4.2  " 3.4  3.7    Chloride 101  103  101    Calcium 9.5  9.2  9.5    WBC  8.19     Hemoglobin  10.2     Hematocrit  30.7     Platelets  301                    Assessment and Plan   Diagnoses and all orders for this visit:    1. Acute on chronic diastolic heart failure (Primary)    2. Simple chronic bronchitis    Will increase Bumex to 2 pills daily  She has follow-up appoint with cardiology in 5 days   Continue inhaler for COPD       Follow Up   Return if symptoms worsen or fail to improve.  Patient was given instructions and counseling regarding her condition or for health maintenance advice. Please see specific information pulled into the AVS if appropriate.

## 2023-12-13 NOTE — H&P (VIEW-ONLY)
"Chief Complaint  Congestive Heart Failure and Hospital Follow Up Visit (Discharged on 11/02)    Subjective        Agnieszka Rizzo presents to [facility]  History of Present Illness  he was found to have a CHF exacerbation and was started on diuretics. Her pulmonary issues complicated this and she was followed by cardiology and pulmonology. She had some rate control issues as well which improved with the medication   New medications on discharge atorvastatin carvedilol digoxin furosemide  Objective   Vital Signs:  /56   Pulse 62   Temp 97.3 °F (36.3 °C)   Ht 160 cm (62.99\")   Wt 60.3 kg (132 lb 14.4 oz)   SpO2 96%   BMI 23.55 kg/m²   Estimated body mass index is 23.55 kg/m² as calculated from the following:    Height as of this encounter: 160 cm (62.99\").    Weight as of this encounter: 60.3 kg (132 lb 14.4 oz).       BMI is within normal parameters. No other follow-up for BMI required.      Physical Exam  Vitals and nursing note reviewed.   HENT:      Head: Normocephalic and atraumatic.   Cardiovascular:      Rate and Rhythm: Normal rate.      Heart sounds: Normal heart sounds.   Pulmonary:      Effort: Pulmonary effort is normal.      Comments: Decreased breath sounds basilar crackles no wheezes  Abdominal:      Tenderness: There is no right CVA tenderness or left CVA tenderness.      Comments: Nontender    Musculoskeletal:      Right lower leg: No edema.      Left lower leg: No edema.   Skin:     General: Skin is warm and dry.   Neurological:      Mental Status: She is alert.   Psychiatric:         Mood and Affect: Mood normal.         Behavior: Behavior normal.         Thought Content: Thought content normal.         Judgment: Judgment normal.        Result Review :    Common labs          12/1/2023    18:38 12/2/2023    04:01 12/7/2023    09:57   Common Labs   Glucose 106  117  94    BUN 17  15  12    Creatinine 1.04  0.78  0.92    Sodium 138  140  140    Potassium 4.2  " 3.4  3.7    Chloride 101  103  101    Calcium 9.5  9.2  9.5    WBC  8.19     Hemoglobin  10.2     Hematocrit  30.7     Platelets  301                    Assessment and Plan   Diagnoses and all orders for this visit:    1. Acute on chronic diastolic heart failure (Primary)    2. Simple chronic bronchitis    Will increase Bumex to 2 pills daily  She has follow-up appoint with cardiology in 5 days   Continue inhaler for COPD       Follow Up   Return if symptoms worsen or fail to improve.  Patient was given instructions and counseling regarding her condition or for health maintenance advice. Please see specific information pulled into the AVS if appropriate.

## 2023-12-14 ENCOUNTER — TELEPHONE (OUTPATIENT)
Dept: CARDIOLOGY | Facility: CLINIC | Age: 88
End: 2023-12-14
Payer: MEDICARE

## 2023-12-14 NOTE — TELEPHONE ENCOUNTER
Patient's daughter calls today to let us know that since they saw you on 12/8 and medications had changed the patient has had no improvement. She states that the patient has not lost any weight, her abdomen is still edematous, she is lethargic, seems a little more confused than normal, and she continues to have a horrible cough. They saw her PCP yesterday and he instructed them to increase her Bumex to 2mg BID. They did this yesterday and she still has had no improvement. Patient is currently on her way to her pulmonologist appointment for follow up. She is scheduled to see you on 12/19, but daughter is concerned that she may need to be seen sooner.     Kasia Shore RN  Triage [facility]

## 2023-12-14 NOTE — TELEPHONE ENCOUNTER
Spoke to daughter. Patient saw Dr. Tucker and he changed the patient's nebulizer treatments. He also advised that she can take them up to 4x a day. He also called in a prescription for prednisone, but advised the patient to only take it if SOA is not better in 2 weeks. Her weight at the office was 134lbs, but this was with shoes and clothing on. Daughter did say that while seeing Dr. Tucker today patient did use the restroom and stated she did void a large amount.     Kasia Shore RN  Triage [facility] [place]

## 2023-12-14 NOTE — TELEPHONE ENCOUNTER
I question if she may need a paracentesis.  If pulmonology needs her evaluated today or tomorrow please let me know and I will work her in.

## 2023-12-14 NOTE — TELEPHONE ENCOUNTER
Maylin,    Pt's daughter called again and just wanted to know if you want her to be seen tomorrow. I asked if we can call her first thing tomorrow and she said that will be fine.    Thank you,    Jyoti Butler RN  [place] [facility] [place] [facility] [place]  12/14/23 16:31 EST

## 2023-12-15 ENCOUNTER — HOME CARE VISIT (OUTPATIENT)
Dept: HOME HEALTH SERVICES | Facility: HOME HEALTHCARE | Age: 88
End: 2023-12-15
Payer: MEDICARE

## 2023-12-15 PROCEDURE — G0299 HHS/HOSPICE OF RN EA 15 MIN: HCPCS

## 2023-12-15 NOTE — TELEPHONE ENCOUNTER
Called and spoke with pt's daughter. Went over recommendations from Mayela. Pt's daughter verbalized understanding.    Thank you,    Jyoti Butler, RN  Triage [facility]  12/15/23 14:15 EST

## 2023-12-16 VITALS
SYSTOLIC BLOOD PRESSURE: 120 MMHG | WEIGHT: 131 LBS | HEART RATE: 92 BPM | TEMPERATURE: 97.8 F | OXYGEN SATURATION: 94 % | DIASTOLIC BLOOD PRESSURE: 64 MMHG | RESPIRATION RATE: 20 BRPM | BODY MASS INDEX: 23.21 KG/M2

## 2023-12-16 NOTE — CASE COMMUNICATION
" pt had MD appt yesterday w/ med changes  Bumex increased to 2 x/day and - new resp nebulizer med- acetylcysteine per pt/ daughter report but she does not have it yet, daughter is to  today but working on costs. Daughter reports pt not doing as well as pt \"puts on\" for the SN, that she comes in and catches her mom struggling to catch her air. During SNV  after SN had pt ambulate and obtained spo2 94% on room air, clear lung sound s, pt did appear soa- but she did not appear to be in distress. weight stable 131 lbs today, 130 lbs last snv, clear lung sounds, pt sts compliance w/ o2 at hs, reports uses percussion vest 3 x/ day - does not produce productive cough.    May pt use oxygen during the day?  "

## 2023-12-16 NOTE — HOME HEALTH
"Routine visit note-  pt had MD appt yesterday w/ med changes  Bumex increased to 2 x/day and - new resp nebulizer med- acetylcysteine per pt/ daughter report but she does not have it yet, daughter is to  today but working on costs. Daughter reports pt not doing as well as pt \"puts on\" for the SN, that she comes in and catches her mom struggling to catch er air. During SNV pSN had pt ambulate and obtained 94% on room air, clear lung sounds, pt did appear  soa- but she did not appear to be in distress. weight stable 131 lbs today, 130 lbs last snv, clear lung sounds, pt sts compliance w/ o2 at hs, reports uses percussion vest 3 x/ day - does not produce productive cough.    Home Health ordered for: SN 2w3, 1w3, 2PRN, PT services    Reason for Hosp/Primary Dx/Co-morbidities: Admission to [facility]: 11/26/2023 - 12/2/2023 Discharge Diagnosis: Acute on chronic diastolic congestive heart failure, COPD (chronic obstructive pulmonary disease), Permanent atrial fibrillation, Iron deficiency anemia, Chronic respiratory failure with hypoxia, Primary hypertension, Chronic coronary artery disease.     Focus of Care: CHF with newly changed meds on Coreg and Dig, monitoring HR and WT for CHF exac prevention    Plan for next visit: FOLLOW UP CHANGE ORDERS FROM MD APPTS,  CP assess, med compliance with med changes, falls prevention, goal based teaching      12/19- Wisam OLIVA"

## 2023-12-18 RX ORDER — BUMETANIDE 2 MG/1
2 TABLET ORAL 2 TIMES DAILY
Qty: 90 TABLET | Refills: 3 | Status: CANCELLED | OUTPATIENT
Start: 2023-12-18

## 2023-12-19 ENCOUNTER — OFFICE VISIT (OUTPATIENT)
Dept: CARDIOLOGY | Facility: CLINIC | Age: 88
End: 2023-12-19
Payer: MEDICARE

## 2023-12-19 ENCOUNTER — HOSPITAL ENCOUNTER (OUTPATIENT)
Dept: CARDIOLOGY | Facility: HOSPITAL | Age: 88
Discharge: HOME | End: 2023-12-19
Admitting: NURSE PRACTITIONER
Payer: MEDICARE

## 2023-12-19 VITALS
SYSTOLIC BLOOD PRESSURE: 125 MMHG | HEART RATE: 82 BPM | HEIGHT: 63 IN | DIASTOLIC BLOOD PRESSURE: 70 MMHG | WEIGHT: 133 LBS | BODY MASS INDEX: 23.57 KG/M2 | OXYGEN SATURATION: 96 %

## 2023-12-19 DIAGNOSIS — I48.21 PERMANENT ATRIAL FIBRILLATION: ICD-10-CM

## 2023-12-19 DIAGNOSIS — I34.0 NONRHEUMATIC MITRAL VALVE REGURGITATION: ICD-10-CM

## 2023-12-19 DIAGNOSIS — I25.10 CHRONIC CORONARY ARTERY DISEASE: ICD-10-CM

## 2023-12-19 DIAGNOSIS — I10 BENIGN ESSENTIAL HYPERTENSION: ICD-10-CM

## 2023-12-19 DIAGNOSIS — I50.33 ACUTE ON CHRONIC DIASTOLIC HEART FAILURE: Primary | ICD-10-CM

## 2023-12-19 DIAGNOSIS — I50.33 ACUTE ON CHRONIC DIASTOLIC HEART FAILURE: ICD-10-CM

## 2023-12-19 LAB
ALBUMIN SERPL-MCNC: 4 G/DL (ref 3.5–5.2)
ALBUMIN/GLOB SERPL: 1.4 G/DL
ALP SERPL-CCNC: 111 U/L (ref 39–117)
ALT SERPL W P-5'-P-CCNC: 16 U/L (ref 1–33)
ANION GAP SERPL CALCULATED.3IONS-SCNC: 11 MMOL/L (ref 5–15)
AST SERPL-CCNC: 18 U/L (ref 1–32)
BILIRUB SERPL-MCNC: 0.4 MG/DL (ref 0–1.2)
BUN SERPL-MCNC: 11 MG/DL (ref 8–23)
BUN/CREAT SERPL: 11.7 (ref 7–25)
CALCIUM SPEC-SCNC: 9.4 MG/DL (ref 8.2–9.6)
CHLORIDE SERPL-SCNC: 98 MMOL/L (ref 98–107)
CO2 SERPL-SCNC: 29 MMOL/L (ref 22–29)
CREAT SERPL-MCNC: 0.94 MG/DL (ref 0.57–1)
EGFRCR SERPLBLD CKD-EPI 2021: 56.7 ML/MIN/1.73
GLOBULIN UR ELPH-MCNC: 2.8 GM/DL
GLUCOSE SERPL-MCNC: 108 MG/DL (ref 65–99)
NT-PROBNP SERPL-MCNC: 3590 PG/ML (ref 0–1800)
POTASSIUM SERPL-SCNC: 3.9 MMOL/L (ref 3.5–5.2)
PROT SERPL-MCNC: 6.8 G/DL (ref 6–8.5)
SODIUM SERPL-SCNC: 138 MMOL/L (ref 136–145)

## 2023-12-19 PROCEDURE — 83880 ASSAY OF NATRIURETIC PEPTIDE: CPT | Performed by: NURSE PRACTITIONER

## 2023-12-19 PROCEDURE — 80053 COMPREHEN METABOLIC PANEL: CPT | Performed by: NURSE PRACTITIONER

## 2023-12-19 PROCEDURE — 96374 THER/PROPH/DIAG INJ IV PUSH: CPT

## 2023-12-19 RX ORDER — BUMETANIDE 0.25 MG/ML
2 INJECTION INTRAMUSCULAR; INTRAVENOUS ONCE
Status: COMPLETED | OUTPATIENT
Start: 2023-12-19 | End: 2023-12-19

## 2023-12-19 RX ORDER — BUMETANIDE 2 MG/1
2 TABLET ORAL 2 TIMES DAILY
Qty: 180 TABLET | Refills: 1 | Status: SHIPPED | OUTPATIENT
Start: 2023-12-19

## 2023-12-19 RX ADMIN — BUMETANIDE 2 MG: 0.25 INJECTION INTRAMUSCULAR; INTRAVENOUS at 10:45

## 2023-12-19 NOTE — PROGRESS NOTES
CARDIOLOGY        Patient Name: Agnieszka Rizzo  :1930  Age: 89 y.o.  Primary Cardiologist: Marcie Robbins MD  Encounter Provider:  PJ Santillan    Date of Service: 23      CHIEF COMPLAINT / REASON FOR OFFICE VISIT     Coronary Artery Disease, Hypertension, and Atrial Fibrillation      HISTORY OF PRESENT ILLNESS       Coronary Artery Disease  Symptoms include shortness of breath. Pertinent negatives include no leg swelling or weight gain.   Hypertension  Associated symptoms include malaise/fatigue and shortness of breath.   Atrial Fibrillation  Symptoms include shortness of breath and weakness. Past medical history includes atrial fibrillation and CAD.     Agnieszka Rizzo is a 89 y.o. female who presents today for ongoing diastolic heart failure exacerbation follow-up.    Pt has a  history significant for PAF, diastolic heart failure, hypertension, chronic CAD.    Review of medical records reveals patient hospitalized with discharge date 2023.  Patient was admitted with dyspnea.  She was found to have acute on chronic diastolic heart failure.  Patient was treated with IV diuretics and symptoms improved quickly.  Home diuretic regimen was increased.  She presents today for follow-up.    Review of medical records reveals that patient was hospitalized with discharge date 2023.  Patient was found to have a CHF exacerbation and was started on diuretics.  Pulmonary issues complicated this and she was followed by both cardiology and pulmonology.  Patient did have rate control issues as well which improved with medication changes at time of discharge.  Patient was discharged with carvedilol, digoxin, furosemide.    At follow-up appointment earlier in the month patient was having signs of worsening diastolic heart failure exacerbation.  Evaluation in the [facility] revealed proBNP gradually uptitrating.  Patient was transitioned to Bumex.  She has had calls to the office during the week stating  "increased shortness of breath as well as abdominal bloating.  Patient was evaluated by her pulmonologist and placed on nebulized treatments.  She presents today for follow-up.    Patient reports that she is continuing to have symptoms that are multifactorial.  She continues to have shortness of breath and remains with a cough.  She notes a 4 pound weight gain at home, increased fatigue as well as swelling to the abdomen.  She was recently evaluated by her pulmonologist who has plans for a CT of the chest next week and has prescribed a new nebulizer treatments however insurance has not yet approved to them.  She notes that she is diuresing well but has not noted a change in her weight since transitioning from furosemide to Bumex.    The following portions of the patient's history were reviewed and updated as appropriate: allergies, current medications, past family history, past medical history, past social history, past surgical history and problem list.      VITAL SIGNS     Visit Vitals  /70   Pulse 82   Ht 159.8 cm (62.9\")   Wt 60.3 kg (133 lb)   SpO2 96%   BMI 23.63 kg/m²         Wt Readings from Last 3 Encounters:   12/19/23 60.3 kg (133 lb)   12/15/23 59.4 kg (131 lb)   12/13/23 60.3 kg (132 lb 14.4 oz)     Body mass index is 23.63 kg/m².      REVIEW OF SYSTEMS   Review of Systems   Constitutional: Positive for malaise/fatigue. Negative for chills, fever, weight gain and weight loss.   Cardiovascular:  Positive for dyspnea on exertion. Negative for leg swelling.   Respiratory:  Positive for shortness of breath. Negative for cough, snoring and wheezing.    Hematologic/Lymphatic: Negative for bleeding problem. Does not bruise/bleed easily.   Skin:  Negative for color change.   Musculoskeletal:  Negative for falls, joint pain and myalgias.   Gastrointestinal:  Positive for bloating. Negative for melena.   Genitourinary:  Negative for hematuria.   Neurological:  Positive for loss of balance and weakness. " Negative for excessive daytime sleepiness.   Psychiatric/Behavioral:  Negative for depression. The patient is not nervous/anxious.            PHYSICAL EXAMINATION     Vitals and nursing note reviewed.   Constitutional:       Appearance: Normal appearance. Well-developed.   Eyes:      Conjunctiva/sclera: Conjunctivae normal.   Neck:      Vascular: No carotid bruit.   Pulmonary:      Breath sounds: Normal breath sounds.      Comments: Crackles to bilateral bases  Cardiovascular:      Normal rate. Irregularly irregular rhythm. Normal S1 with normal intensity. Normal S2 with normal intensity.       Murmurs: There is no murmur.      No gallop.  No click. No rub.   Edema:     Peripheral edema absent.   Musculoskeletal: Normal range of motion. Skin:     General: Skin is warm and dry.   Neurological:      Mental Status: Alert and oriented to person, place, and time.      GCS: GCS eye subscore is 4. GCS verbal subscore is 5. GCS motor subscore is 6.   Psychiatric:         Speech: Speech normal.         Behavior: Behavior normal.         Thought Content: Thought content normal.         Judgment: Judgment normal.           REVIEWED DATA     Procedures    Cardiac Procedures:  Echocardiogram 7/12/2022.  LVEF 55%.  Moderate hypertrophy.  Left atrial volume is moderately increased.  Right atrial cavity is severely dilated.  Moderate mitral valve regurgitation.  Moderate tricuspid valve regurgitation.  Calculated RVSP 31 mmHg.  Zio monitor 1/25/2023.  Noted to be in persistent atrial fibrillation.  Echocardiogram 10/6/2023.  LVEF 56%.  Normal RV cavity size and systolic function.  Left atrial cavity is severely dilated.  Right atrial cavity is severely dilated.  Mild to moderate mitral valve regurgitation.  Moderate tricuspid valve regurgitation.  Small, less than 1 cm circumferential pericardial effusion.  No evidence of tamponade.        Lab Results   Component Value Date     12/19/2023     12/07/2023    K 3.9  12/19/2023    K 3.7 12/07/2023    CL 98 12/19/2023     12/07/2023    CO2 29.0 12/19/2023    CO2 26.9 12/07/2023    BUN 11 12/19/2023    BUN 12 12/07/2023    CREATININE 0.94 12/19/2023    CREATININE 0.92 12/07/2023    EGFRIFNONA 67 08/27/2021    EGFRIFNONA 60 (L) 03/04/2021    EGFRIFAFRI 87 11/14/2019    EGFRIFAFRI 96 06/20/2017    GLUCOSE 108 (H) 12/19/2023    GLUCOSE 94 12/07/2023    CALCIUM 9.4 12/19/2023    CALCIUM 9.5 12/07/2023    PROTENTOTREF 6.2 10/10/2023    ALBUMIN 4.0 12/19/2023    ALBUMIN 3.9 11/26/2023    BILITOT 0.4 12/19/2023    BILITOT 0.3 11/26/2023    AST 18 12/19/2023    AST 22 11/26/2023    ALT 16 12/19/2023    ALT 12 11/26/2023     Lab Results   Component Value Date    WBC 8.19 12/02/2023    WBC 9.10 11/29/2023    HGB 10.2 (L) 12/02/2023    HGB 10.7 (L) 11/29/2023    HCT 30.7 (L) 12/02/2023    HCT 32.7 (L) 11/29/2023    MCV 96.8 12/02/2023    MCV 97.9 (H) 11/29/2023     12/02/2023     11/29/2023     Lab Results   Component Value Date    PROBNP 3,590.0 (H) 12/19/2023    PROBNP 4,003.0 (H) 12/07/2023     Lab Results   Component Value Date    CKTOTAL 66 02/08/2017    CKMB 2.98 02/08/2017    TROPONINT 25 (H) 11/26/2023     Lab Results   Component Value Date    TSH 3.100 08/27/2021    TSH 1.630 05/31/2017             ASSESSMENT & PLAN     Diagnoses and all orders for this visit:    1. Acute on chronic diastolic heart failure (Primary)  Assessment & Plan:  Patient continues to complain of dyspnea, dyspnea with exertion and orthopnea  She has abdominal bloating on exam today and a 4 pound weight gain despite transitioning to high dose diuretics  I do question if patient would benefit from a paracentesis, have made arrangements for this.  She is aware that if there is no fluid pocket to drain that they will not be able to proceed  proBNP drawn in clinic today reveals decrease from last week.  Recommend continuing with plans as per pulmonologist    Orders:  -     Obtain Informed  Consent; Standing  -     Cancel: US Paracentesis; Future  -     Body Fluid Cell Count With Differential - Body Fluid, Peritoneum; Standing  -     Body Fluid Culture - Body Fluid, Peritoneum; Standing  -     Comprehensive Metabolic Panel; Future  -     BNP; Future  -     bumetanide (BUMEX) injection 2 mg  -     US Paracentesis; Future    2. Chronic coronary artery disease  Assessment & Plan:  Denies angina  Continue GDMT:  Atorvastatin  Carvedilol      3. Permanent atrial fibrillation  Assessment & Plan:  Heart rate currently controlled in the 80s  Continue the following:  Digoxin 125 mcg/day  Carvedilol 3.125 mg twice daily  Apixaban 2.5 mg twice daily    CHADS-VASc Risk Assessment              5 Total Score    1 CHF    1 Hypertension    2 Age >/= 75    1 Sex: Female        Criteria that do not apply:    DM    PRIOR STROKE/TIA/THROMBO    Vascular Disease    Age 65-74                4. Benign essential hypertension  Assessment & Plan:  Blood pressure controlled in clinic at 125/70  Continue the following regimen:  Carvedilol 3.125 mg twice daily      5. Nonrheumatic mitral valve regurgitation  Assessment & Plan:  Currently mild          No follow-ups on file.    Future Appointments         Provider Department Center    12/20/2023 4:00 PM Dennis Andino RN [facility] HOME [facility]     12/22/2023 Alireza Goodwin RN [facility] HOME [facility]     12/26/2023 Alireza Goodwin RN [facility] HOME [facility]     12/28/2023 11:30 AM [facility]    1/2/2024 Alireza Goodwin RN [facility] HOME [facility]     1/2/2024 10:00 AM Marcia Joel APRN [facility] CARDIOLOGY [facility]    1/9/2024 Alireza Goodwin RN [facility] HOME CARE [facility]     4/25/2024 9:40 AM Marcie Robbins MD [facility] CARDIOLOGY [facility]                  MEDICATIONS         Discharge Medications            Accurate as of December 19, 2023  3:47 PM. If you have any questions, ask your nurse or doctor.                Changes to  Medications        Instructions Start Date   bumetanide 2 MG tablet  Commonly known as: BUMEX  What changed: when to take this   2 mg, Oral, Daily      FeroSul 325 (65 Fe) MG tablet  Generic drug: ferrous sulfate  What changed:   how much to take  when to take this   TAKE ONE TABLET BY MOUTH DAILY WITH BREAKFAST             Continue These Medications        Instructions Start Date   acetaminophen 325 MG tablet  Commonly known as: TYLENOL   2 tablets, Oral, Every 4 Hours PRN      albuterol sulfate  (90 Base) MCG/ACT inhaler  Commonly known as: PROVENTIL HFA;VENTOLIN HFA;PROAIR HFA   2 puffs, Inhalation, Every 6 Hours PRN      apixaban 2.5 MG tablet tablet  Commonly known as: ELIQUIS   2.5 mg, Oral, Every 12 Hours Scheduled      atorvastatin 40 MG tablet  Commonly known as: LIPITOR   40 mg, Oral, Nightly      benzonatate 200 MG capsule  Commonly known as: TESSALON   1 capsule, Oral, 3 Times Daily PRN      carvedilol 3.125 MG tablet  Commonly known as: COREG   3.125 mg, Oral, 2 Times Daily With Meals      cholecalciferol 25 MCG (1000 UT) tablet  Commonly known as: VITAMIN D3   1 tablet, Oral, Daily      citalopram 20 MG tablet  Commonly known as: CeleXA   20 mg, Oral, Daily      digoxin 125 MCG tablet  Commonly known as: LANOXIN   125 mcg, Oral, Daily Digoxin      fexofenadine 180 MG tablet  Commonly known as: ALLEGRA   1 tablet, Oral, Daily      Florajen Acidophilus capsule   1 tablet, Oral, Daily      fluticasone 50 MCG/ACT nasal spray  Commonly known as: FLONASE   2 sprays, Nasal, Nightly PRN      fluticasone-salmeterol 115-21 MCG/ACT inhaler  Commonly known as: ADVAIR HFA   2 puffs, Inhalation, 2 Times Daily - RT      guaiFENesin 600 MG 12 hr tablet  Commonly known as: MUCINEX   1 tablet, Oral, 2 Times Daily      ipratropium-albuterol 0.5-2.5 mg/3 ml nebulizer  Commonly known as: DUO-NEB   3 mL, Nebulization, 2 Times Daily      multivitamin with minerals tablet tablet   1 tablet, Oral, 2 Times Daily       O2  Commonly known as: OXYGEN   2 L/min, Inhalation, Nightly      potassium chloride 20 MEQ CR tablet  Commonly known as: K-DUR,KLOR-CON   20 mEq, Oral, Daily      promethazine 6.25 MG/5ML solution oral solution  Commonly known as: PHENERGAN   6.25 mg, Oral, As Needed      sodium chloride 7 % nebulizer solution nebulizer solution   4 mL, Nebulization, 2 Times Daily                   **Dragon Disclaimer:   Much of this encounter note is an electronic transcription/translation of spoken language to printed text. The electronic translation of spoken language may permit erroneous, or at times, nonsensical words or phrases to be inadvertently transcribed. Although I have reviewed the note for such errors, some may still exist.

## 2023-12-19 NOTE — ASSESSMENT & PLAN NOTE
Patient continues to complain of dyspnea, dyspnea with exertion and orthopnea  She has abdominal bloating on exam today and a 4 pound weight gain despite transitioning to high dose diuretics  I do question if patient would benefit from a paracentesis, have made arrangements for this.  She is aware that if there is no fluid pocket to drain that they will not be able to proceed  proBNP drawn in clinic today reveals decrease from last week.  Recommend continuing with plans as per pulmonologist

## 2023-12-19 NOTE — ASSESSMENT & PLAN NOTE
Heart rate currently controlled in the 80s  Continue the following:  Digoxin 125 mcg/day  Carvedilol 3.125 mg twice daily  Apixaban 2.5 mg twice daily    CHADS-VASc Risk Assessment              5 Total Score    1 CHF    1 Hypertension    2 Age >/= 75    1 Sex: Female        Criteria that do not apply:    DM    PRIOR STROKE/TIA/THROMBO    Vascular Disease    Age 65-74

## 2023-12-19 NOTE — ASSESSMENT & PLAN NOTE
Blood pressure controlled in clinic at 125/70  Continue the following regimen:  Carvedilol 3.125 mg twice daily

## 2023-12-20 ENCOUNTER — TELEPHONE (OUTPATIENT)
Dept: CARDIOLOGY | Facility: CLINIC | Age: 88
End: 2023-12-20
Payer: MEDICARE

## 2023-12-20 ENCOUNTER — READMISSION MANAGEMENT (OUTPATIENT)
Dept: CALL CENTER | Facility: HOSPITAL | Age: 88
End: 2023-12-20
Payer: MEDICARE

## 2023-12-20 NOTE — PROGRESS NOTES
12/22/23 0002   Pre-Procedure Phone Call   Procedure Time Verified Yes  (Spoke with Michelle, daughter per patient request.)   Arrival Time 1315  (12/22/2023)   Procedure Location Verified Yes   Medical History Reviewed No   NPO Status Reinforced Yes   Ride and Caregiver Arranged Yes  (Michelle, daughter)   Phone Number for Ride/Caregiver Michelle, daughter   Patient Knows to Bring Current Medications Yes   Bring Outside Films Requested No  (First US Paracentesis.)

## 2023-12-20 NOTE — OUTREACH NOTE
CHF Week 3 Survey      Flowsheet Row Responses   [facility] patient discharged from? [place]   Does the patient have one of the following disease processes/diagnoses(primary or secondary)? CHF   Week 3 attempt successful? Yes   Call start time 1536   Call end time 1543   Discharge diagnosis a/c CHF   Person spoke with today (if not patient) and relationship Michelle, daughter   Meds reviewed with patient/caregiver? Yes   Is the patient having any side effects they believe may be caused by any medication additions or changes? No   Does the patient have all medications ordered at discharge? Yes   Is the patient taking all medications as directed (includes completed medication regime)? Yes   Does the patient have a primary care provider?  Yes   Does the patient have an appointment with their PCP within 7 days of discharge? Yes   Has the patient kept scheduled appointments due by today? Yes   What is the Home health agency?  [facility]   Has home health visited the patient within 72 hours of discharge? Yes   Pulse Ox monitoring Intermittent   Pulse Ox device source Patient   O2 Sat comments > 90% on RA, wears O2 at night   O2 Sat: education provided Sat levels   Psychosocial issues? No   Did the patient receive a copy of their discharge instructions? Yes   Nursing interventions Reviewed instructions with patient   What is the patient's perception of their health status since discharge? Improving   Nursing interventions Nurse provided patient education   Is the patient able to teach back signs and symptoms of worsening condition? (i.e. weight gain, shortness of air, etc.) Yes   Is the patient/caregiver able to teach back the hierarchy of who to call/visit for symptoms/problems? PCP, Specialist, Home health nurse, Urgent Care, ED, 911 Yes   Is the patient able to teach back Heart Failure Zones? Yes   CHF Zone this Call Green Zone   Green Zone Patient reports doing well, No new or worsening shortness of breath,  Physical activity level is normal for you, No new swelling -  feet, ankles and legs look normal for you, Weight check stable, No chest pain   Green Zone Interventions Daily weight check, Low sodium diet, Meds as directed, Follow up visits planned   CHF Week 3 call completed? Yes   Graduated Yes   Is the patient interested in additional calls from an ambulatory ? No   Would this patient benefit from a Referral to [facility] Social Work? No   Call end time [id number]            Kaur MATOS - Registered Nurse

## 2023-12-20 NOTE — TELEPHONE ENCOUNTER
Received a call this morning from the pt's home health nurse, Dennis.  He was contacted by pt's daughter, Michelle, regarding paracentesis.  Pt came into the office yesterday for an appt and provider ordered a paracentesis.  There was confusion regarding scheduling.    I called Michelle and provided her  one's number ([phone]) to call and set up paracentesis.    Thank you,    Ryann KURTZ RN  Triage [facility]  12/20/23 10:57 EST

## 2023-12-22 ENCOUNTER — HOSPITAL ENCOUNTER (OUTPATIENT)
Dept: ULTRASOUND IMAGING | Facility: HOSPITAL | Age: 88
Discharge: HOME | End: 2023-12-22
Payer: MEDICARE

## 2023-12-22 ENCOUNTER — HOME CARE VISIT (OUTPATIENT)
Dept: HOME HEALTH SERVICES | Facility: HOME HEALTHCARE | Age: 88
End: 2023-12-22
Payer: MEDICARE

## 2023-12-22 VITALS
BODY MASS INDEX: 22.68 KG/M2 | OXYGEN SATURATION: 93 % | WEIGHT: 128 LBS | SYSTOLIC BLOOD PRESSURE: 156 MMHG | RESPIRATION RATE: 16 BRPM | HEART RATE: 80 BPM | HEIGHT: 63 IN | DIASTOLIC BLOOD PRESSURE: 75 MMHG

## 2023-12-22 DIAGNOSIS — I50.33 ACUTE ON CHRONIC DIASTOLIC HEART FAILURE: ICD-10-CM

## 2023-12-22 PROCEDURE — 76942 ECHO GUIDE FOR BIOPSY: CPT

## 2023-12-22 RX ORDER — PREDNISONE 10 MG/1
1 TABLET ORAL DAILY
COMMUNITY
Start: 2023-12-14

## 2023-12-22 RX ORDER — SODIUM CHLORIDE 9 MG/ML
40 INJECTION, SOLUTION INTRAVENOUS AS NEEDED
OUTPATIENT
Start: 2023-12-22

## 2023-12-22 RX ORDER — SODIUM CHLORIDE 0.9 % (FLUSH) 0.9 %
3 SYRINGE (ML) INJECTION EVERY 12 HOURS SCHEDULED
OUTPATIENT
Start: 2023-12-22

## 2023-12-22 RX ORDER — SODIUM CHLORIDE 0.9 % (FLUSH) 0.9 %
10 SYRINGE (ML) INJECTION AS NEEDED
Status: DISCONTINUED | OUTPATIENT
Start: 2023-12-22 | End: 2023-12-23 | Stop reason: HOSPADM

## 2023-12-22 RX ORDER — SODIUM CHLORIDE 0.9 % (FLUSH) 0.9 %
10 SYRINGE (ML) INJECTION AS NEEDED
OUTPATIENT
Start: 2023-12-22

## 2023-12-22 NOTE — DISCHARGE INSTRUCTIONS
EDUCATION /DISCHARGE INSTRUCTIONS  Paracentesis:  A needle is inserted into the space between your abdominal organs and the membrane that surrounds them (peritoneal space).  It is done for the diagnosis and treatment of fluid that is resistant to other therapies.  It helps determine the cause of the fluid and at the relieves pressure created by the fluid.  A sample is obtained and sent to the laboratory for study.  During the procedure:  You will lie on a bed on your back with your legs drawn up.  Your abdomen will be exposed from the chest to the pelvis.  You will otherwise be covered to maintain comfort.  A physician will clean your abdomen with antiseptic soap, place a sterile towel around the site and administer a local anesthetic to numb the area.  The physician will insert a needle into your abdominal wall.  There may be a popping sound which signifies the needle has pierced the abdominal wall. Next, the physician will attach tubing to transfer a sample into a collection bottle.  After the fluid is obtained the needle will be removed.  A pressure dressing is applied to the site.  Risks of the procedure include but are not limited to:   *  Bleeding    *  Wound infection   *  Low blood pressure   *  Decreased urination   *  Low sodium if a large amount of fluid is removed   *  Puncture of abdominal organs by the needle    Benefits of the procedure:  Benefits include the removal of fluid from the abdomen, relief of abdominal pressure and facilitation of a diagnosis.  Alternatives to the procedure:  Possible alternatives are diuretic drug therapy or surgery to place a shunt to drain fluid.  Risks of diuretic drug therapy include possible dehydration and renal failure.  The benefit of drug therapy is that it can be done at home under physician supervision.  Risks of shunt placement include exposure to anesthesia, infection, excessive bleeding and injury to abdominal organs.  The benefit of a shunt is that it can be  used to drain fluid over a longer period of time.  THIS EDUCATION INFORMATION WAS REVIEWED PRIOR TO THE PROCEDURE AND CONSENT. Patient initials__________________Time_________________    Post procedure: (Follow all the instructions below carefully)   *  Weigh yourself daily.   *  Follow your doctors dietary instructions.   *  Rest today (no pushing pulling, straining or heavy lifting).   *  Slowly increase activity tomorow.   *  If you received sedation do not drive for 24 hours.              * Skin affix  applied to puncture site. Do not try to remove, scratch or apply lotion   * Skin affix will fall off on it's own   *  You may shower tomorrow  Call your doctor if experiencing:   *  Signs of infection such as redness, swelling, excessive pain and / or foul       smelling drainage from the puncture site.   *  Chills or fever over 101 degrees (by mouth).   *  Fainting or any noted Rapid weight gain/loss   *  Unrelieved pain or any new or severe symptoms  Following the procedure:      Follow-up with the ordering physician as directed.   Continue to take other medications as directed by your physician unless    otherwise instructed.   If applicable, resume taking your blood thinners or Aspirin in 24 hours.              If you have any concerns please call the Radiology Nurses Desk at [phone]

## 2023-12-22 NOTE — CASE COMMUNICATION
Patient missed a SN visit from [facility] oN 12/22/23.     Reason: PATIENT REQUEST, HE WAS NOT GOING TO BE HOME       For your records only.   Per CMS Guidance, MD must be notified of missed/cancelled visits; therefore the prescribed frequency was not met.

## 2023-12-22 NOTE — CASE COMMUNICATION
Patient missed a SN visit from [facility] on 12/22/23    Reason:medical appointment      For your records only.   Per CMS Guidance, MD must be notified of missed/cancelled visits; therefore the prescribed frequency was not met.

## 2023-12-22 NOTE — NURSING NOTE
Patient discharged home with belongings. Patient ambulatory at discharge escorted by family. Paracentesis was not performed there was not enough fluid -per ultrasound technician.

## 2023-12-27 ENCOUNTER — HOME CARE VISIT (OUTPATIENT)
Dept: HOME HEALTH SERVICES | Facility: HOME HEALTHCARE | Age: 88
End: 2023-12-27
Payer: MEDICARE

## 2023-12-27 PROCEDURE — G0493 RN CARE EA 15 MIN HH/HOSPICE: HCPCS

## 2023-12-27 NOTE — HOME HEALTH
Routine visit note-  pt has been evaled for hospice since last snv- daughter reports pt is not a candidate yet but will be going into palliative care, pt/daughter report pt w/ increased cough since last snv, denies fever, or productive cough , pt reports increased fatigue. she was scheduled for a paracentesis last fri 12/22 and did not have procedure as no abd fluid found via ultrasound prior to procedure. weight stable 127 lbs, vs wnl clear lung sounds, pt states complaince w/ precussion vest 3x/ day, states complaince w/ nebualizer - daughter reports acetylsytine still not approved by insurance.    Reason for Hosp/Primary Dx/Co-morbidities: Admission to [facility]: 11/26/2023 - 12/2/2023 Discharge Diagnosis: Acute on chronic diastolic congestive heart failure, COPD (chronic obstructive pulmonary disease), Permanent atrial fibrillation, Iron deficiency anemia, Chronic respiratory failure with hypoxia, Primary hypertension, Chronic coronary artery disease.     Focus of Care: CHF with newly changed meds on Coreg and Dig, monitoring HR and WT for CHF exac prevention    Plan for next visit: FOLLOW UP CHANGE ORDERS FROM MD APPTS,  CP ASSESSMENT

## 2023-12-28 ENCOUNTER — HOSPITAL ENCOUNTER (OUTPATIENT)
Dept: CT IMAGING | Facility: HOSPITAL | Age: 88
Discharge: HOME | End: 2023-12-28
Admitting: NURSE PRACTITIONER
Payer: MEDICARE

## 2023-12-28 VITALS
WEIGHT: 127 LBS | HEART RATE: 85 BPM | TEMPERATURE: 98.2 F | BODY MASS INDEX: 22.5 KG/M2 | OXYGEN SATURATION: 98 % | DIASTOLIC BLOOD PRESSURE: 64 MMHG | SYSTOLIC BLOOD PRESSURE: 120 MMHG | RESPIRATION RATE: 18 BRPM

## 2023-12-28 DIAGNOSIS — J18.9 PNEUMONIA DUE TO INFECTIOUS ORGANISM, UNSPECIFIED LATERALITY, UNSPECIFIED PART OF LUNG: ICD-10-CM

## 2023-12-28 PROCEDURE — 71250 CT THORAX DX C-: CPT

## 2023-12-28 RX ORDER — CARVEDILOL 3.12 MG/1
3.12 TABLET ORAL 2 TIMES DAILY WITH MEALS
Qty: 180 TABLET | Refills: 3 | Status: SHIPPED | OUTPATIENT
Start: 2023-12-28

## 2023-12-28 RX ORDER — DIGOXIN 125 MCG
125 TABLET ORAL
Qty: 90 TABLET | Refills: 3 | Status: SHIPPED | OUTPATIENT
Start: 2023-12-28

## 2023-12-28 NOTE — TELEPHONE ENCOUNTER
Patient Daughter Michelle called requesting refills on patients Digoxin 125 MCG and Carvedilol 3.125 MG.  She stated that her Mother was given these medications while she was admitted at the End of November, and she is running out. They were originally filled by Hospital physician.       Patient does have an upcoming appointment scheduled with [facility] on 1/2/2024, however patient will be out prior to that appointment.     Please advise  LEYLA Harry   12/28/2023            Rx Refill Note  Requested Prescriptions     Pending Prescriptions Disp Refills    digoxin (LANOXIN) 125 MCG tablet 30 tablet 0     Sig: Take 1 tablet by mouth Daily. Indications: Atrial Fibrillation, Cardiac Failure    carvedilol (COREG) 3.125 MG tablet 60 tablet 0     Sig: Take 1 tablet by mouth 2 (Two) Times a Day With Meals. Indications: Cardiac Failure, High Blood Pressure Disorder      Last office visit with prescribing clinician: 10/19/2023   Last telemedicine visit with prescribing clinician: Visit date not found   Next office visit with prescribing clinician: 4/25/2024                         Would you like a call back once the refill request has been completed: [] Yes [] No    If the office needs to give you a call back, can they leave a voicemail: [] Yes [] No    Kamryn Harrison CMA  12/28/23, 14:07 EST

## 2024-01-02 ENCOUNTER — OFFICE VISIT (OUTPATIENT)
Dept: CARDIOLOGY | Facility: CLINIC | Age: 89
End: 2024-01-02
Payer: COMMERCIAL

## 2024-01-02 VITALS
BODY MASS INDEX: 22.5 KG/M2 | WEIGHT: 127 LBS | HEIGHT: 63 IN | DIASTOLIC BLOOD PRESSURE: 78 MMHG | OXYGEN SATURATION: 95 % | SYSTOLIC BLOOD PRESSURE: 123 MMHG | HEART RATE: 85 BPM

## 2024-01-02 DIAGNOSIS — I10 BENIGN ESSENTIAL HYPERTENSION: ICD-10-CM

## 2024-01-02 DIAGNOSIS — I48.21 PERMANENT ATRIAL FIBRILLATION: Primary | ICD-10-CM

## 2024-01-02 DIAGNOSIS — I25.10 CHRONIC CORONARY ARTERY DISEASE: ICD-10-CM

## 2024-01-02 DIAGNOSIS — I50.32 CHRONIC DIASTOLIC CHF (CONGESTIVE HEART FAILURE): ICD-10-CM

## 2024-01-02 RX ORDER — AZITHROMYCIN 250 MG/1
250 TABLET, FILM COATED ORAL DAILY
COMMUNITY

## 2024-01-02 NOTE — PROGRESS NOTES
CARDIOLOGY        Patient Name: Agnieszka Rizzo  :1930  Age: 89 y.o.  Primary Cardiologist: Marcie Robbins MD  Encounter Provider:  PJ Santillan    Date of Service: 24      CHIEF COMPLAINT / REASON FOR OFFICE VISIT     Follow-Up and Hospital Follow Up Visit      HISTORY OF PRESENT ILLNESS       Coronary Artery Disease  Pertinent negatives include no leg swelling or weight gain.   Hypertension  Associated symptoms include malaise/fatigue.   Atrial Fibrillation  Symptoms are negative for weakness. Past medical history includes atrial fibrillation and CAD.     Agnieszka Rizzo is a 89 y.o. female who presents today for ongoing diastolic heart failure exacerbation follow-up.    Pt has a  history significant for PAF, diastolic heart failure, hypertension, chronic CAD.    Review of medical records reveals patient hospitalized with discharge date 2023.  Patient was admitted with dyspnea.  She was found to have acute on chronic diastolic heart failure.  Patient was treated with IV diuretics and symptoms improved quickly.  Home diuretic regimen was increased.  She presents today for follow-up.    Review of medical records reveals that patient was hospitalized with discharge date 2023.  Patient was found to have a CHF exacerbation and was started on diuretics.  Pulmonary issues complicated this and she was followed by both cardiology and pulmonology.  Patient did have rate control issues as well which improved with medication changes at time of discharge.  Patient was discharged with carvedilol, digoxin, furosemide.    At follow-up appointment earlier in the month patient was having signs of worsening diastolic heart failure exacerbation.  Evaluation in the [facility] revealed proBNP gradually uptitrating.  Patient was transitioned to Bumex.  She has had calls to the office during the week stating increased shortness of breath as well as abdominal bloating.  Patient was evaluated by her pulmonologist  "and placed on nebulized treatments.  She presents today for follow-up.    At last assessment patient was having ongoing shortness of breath as well as abdominal bloating.  Was concerned that patient had ascites so show I placed an order for ultrasound-guided paracentesis however there was no obtainable fluid to be drained.  Patient presents today for repeat assessment.    Patient reports that since last assessment she actually believes that her shortness of breath and volume overload symptoms have improved.  She does report some generalized fatigue and notes that she takes naps frequently.  She reports that she is only short of breath when she walks for longer distances.  Currently denies chest pain, lightheadedness, edema    The following portions of the patient's history were reviewed and updated as appropriate: allergies, current medications, past family history, past medical history, past social history, past surgical history and problem list.      VITAL SIGNS     Visit Vitals  /78 (BP Location: Left arm, Patient Position: Sitting)   Pulse 85   Ht 160 cm (63\")   Wt 57.6 kg (127 lb)   SpO2 95%   BMI 22.50 kg/m²         Wt Readings from Last 3 Encounters:   01/02/24 57.6 kg (127 lb)   12/27/23 57.6 kg (127 lb)   12/22/23 58.1 kg (128 lb)     Body mass index is 22.5 kg/m².      REVIEW OF SYSTEMS   Review of Systems   Constitutional: Positive for malaise/fatigue. Negative for chills, fever, weight gain and weight loss.   Cardiovascular:  Negative for leg swelling.   Respiratory:  Negative for cough, snoring and wheezing.    Hematologic/Lymphatic: Negative for bleeding problem. Does not bruise/bleed easily.   Skin:  Negative for color change.   Musculoskeletal:  Negative for falls, joint pain and myalgias.   Gastrointestinal:  Negative for bloating and melena.   Genitourinary:  Negative for hematuria.   Neurological:  Negative for excessive daytime sleepiness, loss of balance and weakness. "   Psychiatric/Behavioral:  Negative for depression. The patient is not nervous/anxious.            PHYSICAL EXAMINATION     Vitals and nursing note reviewed.   Constitutional:       Appearance: Normal appearance. Well-developed.   Eyes:      Conjunctiva/sclera: Conjunctivae normal.   Neck:      Vascular: No carotid bruit.   Pulmonary:      Breath sounds: Normal breath sounds.      Comments: Crackles to bilateral bases  Cardiovascular:      Normal rate. Irregularly irregular rhythm. Normal S1 with normal intensity. Normal S2 with normal intensity.       Murmurs: There is no murmur.      No gallop.  No click. No rub.   Edema:     Peripheral edema absent.   Musculoskeletal: Normal range of motion. Skin:     General: Skin is warm and dry.   Neurological:      Mental Status: Alert and oriented to person, place, and time.      GCS: GCS eye subscore is 4. GCS verbal subscore is 5. GCS motor subscore is 6.   Psychiatric:         Speech: Speech normal.         Behavior: Behavior normal.         Thought Content: Thought content normal.         Judgment: Judgment normal.           REVIEWED DATA     Procedures    Cardiac Procedures:  Echocardiogram 7/12/2022.  LVEF 55%.  Moderate hypertrophy.  Left atrial volume is moderately increased.  Right atrial cavity is severely dilated.  Moderate mitral valve regurgitation.  Moderate tricuspid valve regurgitation.  Calculated RVSP 31 mmHg.  Zio monitor 1/25/2023.  Noted to be in persistent atrial fibrillation.  Echocardiogram 10/6/2023.  LVEF 56%.  Normal RV cavity size and systolic function.  Left atrial cavity is severely dilated.  Right atrial cavity is severely dilated.  Mild to moderate mitral valve regurgitation.  Moderate tricuspid valve regurgitation.  Small, less than 1 cm circumferential pericardial effusion.  No evidence of tamponade.        Lab Results   Component Value Date     12/19/2023     12/07/2023    K 3.9 12/19/2023    K 3.7 12/07/2023    CL 98 12/19/2023      12/07/2023    CO2 29.0 12/19/2023    CO2 26.9 12/07/2023    BUN 11 12/19/2023    BUN 12 12/07/2023    CREATININE 0.94 12/19/2023    CREATININE 0.92 12/07/2023    EGFRIFNONA 67 08/27/2021    EGFRIFNONA 60 (L) 03/04/2021    EGFRIFAFRI 87 11/14/2019    EGFRIFAFRI 96 06/20/2017    GLUCOSE 108 (H) 12/19/2023    GLUCOSE 94 12/07/2023    CALCIUM 9.4 12/19/2023    CALCIUM 9.5 12/07/2023    PROTENTOTREF 6.2 10/10/2023    ALBUMIN 4.0 12/19/2023    ALBUMIN 3.9 11/26/2023    BILITOT 0.4 12/19/2023    BILITOT 0.3 11/26/2023    AST 18 12/19/2023    AST 22 11/26/2023    ALT 16 12/19/2023    ALT 12 11/26/2023     Lab Results   Component Value Date    WBC 8.19 12/02/2023    WBC 9.10 11/29/2023    HGB 10.2 (L) 12/02/2023    HGB 10.7 (L) 11/29/2023    HCT 30.7 (L) 12/02/2023    HCT 32.7 (L) 11/29/2023    MCV 96.8 12/02/2023    MCV 97.9 (H) 11/29/2023     12/02/2023     11/29/2023     Lab Results   Component Value Date    PROBNP 3,590.0 (H) 12/19/2023    PROBNP 4,003.0 (H) 12/07/2023     Lab Results   Component Value Date    CKTOTAL 66 02/08/2017    CKMB 2.98 02/08/2017    TROPONINT 25 (H) 11/26/2023     Lab Results   Component Value Date    TSH 3.100 08/27/2021    TSH 1.630 05/31/2017             ASSESSMENT & PLAN     Diagnoses and all orders for this visit:    1. Permanent atrial fibrillation (Primary)  Assessment & Plan:  HR is controlled in the clinic  Continue the following:  Digoxin 125 mcg/day  Carvedilol 3.125 mg twice daily  Apixaban 2.5 mg twice daily    CHADS-VASc Risk Assessment              5 Total Score    1 CHF    1 Hypertension    2 Age >/= 75    1 Sex: Female        Criteria that do not apply:    DM    PRIOR STROKE/TIA/THROMBO    Vascular Disease    Age 65-74                2. Chronic coronary artery disease  Assessment & Plan:  Denies angina  Continue GDMT:  Atorvastatin  Carvedilol      3. Benign essential hypertension    4. Chronic diastolic CHF (congestive heart failure)  Assessment &  Plan:  NYHA Class I.  Euvolemic on exam, she did have some abdominal bloating and I was concerned for ascites, US did no reveall any fluid accumulation  Denies dyspnea  Continue Bumex            No follow-ups on file.    Future Appointments         Provider Department Center    1/9/2024 Alireza Goodwin RN [facility] HOME CARE [place]     4/25/2024 9:40 AM Marcie Robbins MD [facility] CARDIOLOGY [place]                  MEDICATIONS         Discharge Medications            Accurate as of January 2, 2024 10:37 AM. If you have any questions, ask your nurse or doctor.                Changes to Medications        Instructions Start Date   FeroSul 325 (65 Fe) MG tablet  Generic drug: ferrous sulfate  What changed:   how much to take  when to take this   TAKE ONE TABLET BY MOUTH DAILY WITH BREAKFAST             Continue These Medications        Instructions Start Date   acetaminophen 325 MG tablet  Commonly known as: TYLENOL   2 tablets, Oral, Every 4 Hours PRN      albuterol sulfate  (90 Base) MCG/ACT inhaler  Commonly known as: PROVENTIL HFA;VENTOLIN HFA;PROAIR HFA   2 puffs, Inhalation, Every 6 Hours PRN      apixaban 2.5 MG tablet tablet  Commonly known as: ELIQUIS   2.5 mg, Oral, Every 12 Hours Scheduled      azithromycin 250 MG tablet  Commonly known as: ZITHROMAX   250 mg, Oral, Daily      benzonatate 200 MG capsule  Commonly known as: TESSALON   1 capsule, Oral, 3 Times Daily PRN      bumetanide 2 MG tablet  Commonly known as: BUMEX   2 mg, Oral, 2 Times Daily      carvedilol 3.125 MG tablet  Commonly known as: COREG   3.125 mg, Oral, 2 Times Daily With Meals      cholecalciferol 25 MCG (1000 UT) tablet  Commonly known as: VITAMIN D3   1 tablet, Oral, Daily      citalopram 20 MG tablet  Commonly known as: CeleXA   20 mg, Oral, Daily      digoxin 125 MCG tablet  Commonly known as: LANOXIN   125 mcg, Oral, Daily Digoxin      fexofenadine 180 MG tablet  Commonly known as: ALLEGRA   1 tablet, Oral,  Daily      Florajen Acidophilus capsule   1 tablet, Oral, Daily      fluticasone-salmeterol 115-21 MCG/ACT inhaler  Commonly known as: ADVAIR HFA   2 puffs, Inhalation, 2 Times Daily - RT      guaiFENesin 600 MG 12 hr tablet  Commonly known as: MUCINEX   1 tablet, Oral, 2 Times Daily      ipratropium-albuterol 0.5-2.5 mg/3 ml nebulizer  Commonly known as: DUO-NEB   3 mL, Nebulization, 2 Times Daily      multivitamin with minerals tablet tablet   1 tablet, Oral, 2 Times Daily      O2  Commonly known as: OXYGEN   2 L/min, Inhalation, Nightly      potassium chloride 20 MEQ CR tablet  Commonly known as: K-DUR,KLOR-CON   20 mEq, Oral, Daily      predniSONE 10 MG tablet  Commonly known as: DELTASONE   1 tablet, Oral, Daily      promethazine 6.25 MG/5ML solution oral solution  Commonly known as: PHENERGAN   6.25 mg, Oral, As Needed      sodium chloride 7 % nebulizer solution nebulizer solution   4 mL, Nebulization, 2 Times Daily                   **Dragon Disclaimer:   Much of this encounter note is an electronic transcription/translation of spoken language to printed text. The electronic translation of spoken language may permit erroneous, or at times, nonsensical words or phrases to be inadvertently transcribed. Although I have reviewed the note for such errors, some may still exist.

## 2024-01-03 ENCOUNTER — HOME CARE VISIT (OUTPATIENT)
Dept: HOME HEALTH SERVICES | Facility: HOME HEALTHCARE | Age: 89
End: 2024-01-03
Payer: MEDICARE

## 2024-01-03 PROCEDURE — G0300 HHS/HOSPICE OF LPN EA 15 MIN: HCPCS

## 2024-01-04 VITALS
RESPIRATION RATE: 18 BRPM | HEART RATE: 73 BPM | DIASTOLIC BLOOD PRESSURE: 58 MMHG | SYSTOLIC BLOOD PRESSURE: 122 MMHG | TEMPERATURE: 97.1 F | OXYGEN SATURATION: 93 %

## 2024-01-04 PROBLEM — I50.32 CHRONIC DIASTOLIC CHF (CONGESTIVE HEART FAILURE): Status: ACTIVE | Noted: 2023-11-27

## 2024-01-04 NOTE — ASSESSMENT & PLAN NOTE
NYHA Class I.  Euvolemic on exam, she did have some abdominal bloating and I was concerned for ascites, US did no reveall any fluid accumulation  Denies dyspnea  Continue Bumex

## 2024-01-04 NOTE — ASSESSMENT & PLAN NOTE
HR is controlled in the clinic  Continue the following:  Digoxin 125 mcg/day  Carvedilol 3.125 mg twice daily  Apixaban 2.5 mg twice daily    CHADS-VASc Risk Assessment              5 Total Score    1 CHF    1 Hypertension    2 Age >/= 75    1 Sex: Female        Criteria that do not apply:    DM    PRIOR STROKE/TIA/THROMBO    Vascular Disease    Age 65-74

## 2024-01-08 ENCOUNTER — HOME CARE VISIT (OUTPATIENT)
Dept: HOME HEALTH SERVICES | Facility: HOME HEALTHCARE | Age: 89
End: 2024-01-08
Payer: MEDICARE

## 2024-01-08 PROCEDURE — G0493 RN CARE EA 15 MIN HH/HOSPICE: HCPCS

## 2024-01-08 NOTE — HOME HEALTH
Routine Visit Note: VS wnl, no increase in sob, weight stable, daughter not present today, daughter manages meds and not present at snv today, SN insturct will d/c next snv so that daughter can be present for d/c instructions    Skill/education provided: skilled assessment    Patient/caregiver response: pt tolerated w/o c/o discomfort    Plan for next visit: d/c next snv w/ daughter present for snv    Other pertinent info: daughter manages meds

## 2024-01-09 VITALS
RESPIRATION RATE: 16 BRPM | TEMPERATURE: 98.9 F | DIASTOLIC BLOOD PRESSURE: 60 MMHG | SYSTOLIC BLOOD PRESSURE: 120 MMHG | BODY MASS INDEX: 22.5 KG/M2 | HEART RATE: 78 BPM | OXYGEN SATURATION: 97 % | WEIGHT: 127 LBS

## 2024-01-15 ENCOUNTER — HOME CARE VISIT (OUTPATIENT)
Dept: HOME HEALTH SERVICES | Facility: HOME HEALTHCARE | Age: 89
End: 2024-01-15
Payer: MEDICARE

## 2024-01-15 PROCEDURE — G0162 HHC RN E&M PLAN SVS, 15 MIN: HCPCS

## 2024-01-16 VITALS
TEMPERATURE: 98.7 F | RESPIRATION RATE: 20 BRPM | SYSTOLIC BLOOD PRESSURE: 126 MMHG | HEART RATE: 68 BPM | OXYGEN SATURATION: 98 % | DIASTOLIC BLOOD PRESSURE: 70 MMHG

## 2024-01-31 RX ORDER — EZETIMIBE 10 MG/1
10 TABLET ORAL DAILY
Qty: 90 TABLET | Refills: 3 | OUTPATIENT
Start: 2024-01-31

## 2024-02-22 NOTE — TELEPHONE ENCOUNTER
Caller: Michelle Zaldivar    Relationship: Emergency Contact    Best call back number: [phone]     Requested Prescriptions:   Requested Prescriptions     Pending Prescriptions Disp Refills    citalopram (CeleXA) 20 MG tablet       Sig: Take 1 tablet by mouth Daily. Indications: Major Depressive Disorder        Pharmacy where request should be sent: [id number] [address] AT [facility], [place], operated by [facility] [phone] [facility] [address] [phone]      Last office visit with prescribing clinician: 12/13/2023   Last telemedicine visit with prescribing clinician: Visit date not found   Next office visit with prescribing clinician: Visit date not found     Additional details provided by patient: PATIENT HAS A WEEK AND HALF SUPPLY LEFT    Does the patient have less than a 3 day supply:  [] Yes  [x] No    Would you like a call back once the refill request has been completed: [x] Yes [] No    If the office needs to give you a call back, can they leave a voicemail: [x] Yes [] No    Dennise Vergara Rep   02/22/24 14:24 EST

## 2024-02-23 RX ORDER — CITALOPRAM 20 MG/1
20 TABLET ORAL DAILY
Qty: 30 TABLET | Refills: 1 | Status: SHIPPED | OUTPATIENT
Start: 2024-02-23

## 2024-02-23 NOTE — TELEPHONE ENCOUNTER
Rx Refill Note  Requested Prescriptions     Pending Prescriptions Disp Refills    citalopram (CeleXA) 20 MG tablet 30 tablet 1     Sig: Take 1 tablet by mouth Daily. Indications: Major Depressive Disorder      Last office visit with prescribing clinician: 12/13/2023   Last telemedicine visit with prescribing clinician: Visit date not found   Next office visit with prescribing clinician: Visit date not found                         Would you like a call back once the refill request has been completed: [] Yes [] No    If the office needs to give you a call back, can they leave a voicemail: [] Yes [] No    Sybil Alejandro MA  02/23/24, 16:57 EST

## 2024-03-01 ENCOUNTER — HOSPITAL ENCOUNTER (EMERGENCY)
Facility: HOSPITAL | Age: 89
Discharge: HOME | End: 2024-03-01
Attending: EMERGENCY MEDICINE
Payer: MEDICARE

## 2024-03-01 VITALS
SYSTOLIC BLOOD PRESSURE: 134 MMHG | BODY MASS INDEX: 22.5 KG/M2 | HEIGHT: 63 IN | TEMPERATURE: 98.1 F | WEIGHT: 127 LBS | OXYGEN SATURATION: 100 % | DIASTOLIC BLOOD PRESSURE: 70 MMHG | HEART RATE: 73 BPM | RESPIRATION RATE: 16 BRPM

## 2024-03-01 DIAGNOSIS — R41.0 INTERMITTENT CONFUSION: Primary | ICD-10-CM

## 2024-03-01 LAB
ALBUMIN SERPL-MCNC: 4.5 G/DL (ref 3.5–5.2)
ALBUMIN/GLOB SERPL: 1.8 G/DL
ALP SERPL-CCNC: 104 U/L (ref 39–117)
ALT SERPL W P-5'-P-CCNC: 24 U/L (ref 1–33)
ANION GAP SERPL CALCULATED.3IONS-SCNC: 13 MMOL/L (ref 5–15)
AST SERPL-CCNC: 23 U/L (ref 1–32)
BASOPHILS # BLD AUTO: 0.07 10*3/MM3 (ref 0–0.2)
BASOPHILS NFR BLD AUTO: 0.6 % (ref 0–1.5)
BILIRUB SERPL-MCNC: 0.7 MG/DL (ref 0–1.2)
BILIRUB UR QL STRIP: NEGATIVE
BUN SERPL-MCNC: 18 MG/DL (ref 8–23)
BUN/CREAT SERPL: 17 (ref 7–25)
CALCIUM SPEC-SCNC: 9.9 MG/DL (ref 8.2–9.6)
CHLORIDE SERPL-SCNC: 96 MMOL/L (ref 98–107)
CLARITY UR: CLEAR
CO2 SERPL-SCNC: 29 MMOL/L (ref 22–29)
COLOR UR: YELLOW
CREAT SERPL-MCNC: 1.06 MG/DL (ref 0.57–1)
DEPRECATED RDW RBC AUTO: 56.5 FL (ref 37–54)
EGFRCR SERPLBLD CKD-EPI 2021: 49.1 ML/MIN/1.73
EOSINOPHIL # BLD AUTO: 0.03 10*3/MM3 (ref 0–0.4)
EOSINOPHIL NFR BLD AUTO: 0.3 % (ref 0.3–6.2)
ERYTHROCYTE [DISTWIDTH] IN BLOOD BY AUTOMATED COUNT: 14.9 % (ref 12.3–15.4)
GLOBULIN UR ELPH-MCNC: 2.5 GM/DL
GLUCOSE SERPL-MCNC: 177 MG/DL (ref 65–99)
GLUCOSE UR STRIP-MCNC: NEGATIVE MG/DL
HCT VFR BLD AUTO: 39 % (ref 34–46.6)
HGB BLD-MCNC: 12.7 G/DL (ref 12–15.9)
HGB UR QL STRIP.AUTO: ABNORMAL
IMM GRANULOCYTES # BLD AUTO: 0.16 10*3/MM3 (ref 0–0.05)
IMM GRANULOCYTES NFR BLD AUTO: 1.4 % (ref 0–0.5)
KETONES UR QL STRIP: NEGATIVE
LEUKOCYTE ESTERASE UR QL STRIP.AUTO: NEGATIVE
LYMPHOCYTES # BLD AUTO: 1.23 10*3/MM3 (ref 0.7–3.1)
LYMPHOCYTES NFR BLD AUTO: 10.7 % (ref 19.6–45.3)
MCH RBC QN AUTO: 33.7 PG (ref 26.6–33)
MCHC RBC AUTO-ENTMCNC: 32.6 G/DL (ref 31.5–35.7)
MCV RBC AUTO: 103.4 FL (ref 79–97)
MONOCYTES # BLD AUTO: 0.6 10*3/MM3 (ref 0.1–0.9)
MONOCYTES NFR BLD AUTO: 5.2 % (ref 5–12)
NEUTROPHILS NFR BLD AUTO: 81.8 % (ref 42.7–76)
NEUTROPHILS NFR BLD AUTO: 9.36 10*3/MM3 (ref 1.7–7)
NITRITE UR QL STRIP: NEGATIVE
PH UR STRIP.AUTO: 6.5 [PH] (ref 5–8)
PLATELET # BLD AUTO: 300 10*3/MM3 (ref 140–450)
PMV BLD AUTO: 9.8 FL (ref 6–12)
POTASSIUM SERPL-SCNC: 3.6 MMOL/L (ref 3.5–5.2)
PROT SERPL-MCNC: 7 G/DL (ref 6–8.5)
PROT UR QL STRIP: NEGATIVE
RBC # BLD AUTO: 3.77 10*6/MM3 (ref 3.77–5.28)
SODIUM SERPL-SCNC: 138 MMOL/L (ref 136–145)
SP GR UR STRIP: 1.01 (ref 1–1.03)
UROBILINOGEN UR QL STRIP: ABNORMAL
WBC NRBC COR # BLD AUTO: 11.45 10*3/MM3 (ref 3.4–10.8)

## 2024-03-01 PROCEDURE — 99284 EMERGENCY DEPT VISIT MOD MDM: CPT | Performed by: NURSE PRACTITIONER

## 2024-03-01 PROCEDURE — 85025 COMPLETE CBC W/AUTO DIFF WBC: CPT | Performed by: NURSE PRACTITIONER

## 2024-03-01 PROCEDURE — 99283 EMERGENCY DEPT VISIT LOW MDM: CPT

## 2024-03-01 PROCEDURE — 81003 URINALYSIS AUTO W/O SCOPE: CPT | Performed by: NURSE PRACTITIONER

## 2024-03-01 PROCEDURE — 80053 COMPREHEN METABOLIC PANEL: CPT | Performed by: NURSE PRACTITIONER

## 2024-03-01 PROCEDURE — 36415 COLL VENOUS BLD VENIPUNCTURE: CPT

## 2024-03-01 RX ORDER — SODIUM CHLORIDE 0.9 % (FLUSH) 0.9 %
10 SYRINGE (ML) INJECTION AS NEEDED
Status: DISCONTINUED | OUTPATIENT
Start: 2024-03-01 | End: 2024-03-01 | Stop reason: HOSPADM

## 2024-03-01 NOTE — FSED PROVIDER NOTE
Subjective   History of Present Illness  Patient is a 89-year-old female who presents with daughter for increasing confusion over the last few months.  Daughter states patient is currently on palliative care for end-stage heart failure, denies any history of dementia.  States she seems to be having a more difficult time processing tasks, states patient had friends over to play cards and was unable to process how to play cards.  Patient did see her pulmonologist yesterday who states everything looks good from his standpoint at this time.  Patient denies cough, chest pain, shortness of breath.  Daughter called the palliative nurse who recommended she come in for evaluation for possible UTI.  Patient denies focal weakness, numbness, tingling.      Review of Systems   Psychiatric/Behavioral:  Positive for confusion.    All other systems reviewed and are negative.      Past Medical History:   Diagnosis Date    Aspergillus     Asthma     Atrial fibrillation     chronic    Atrial flutter     Bronchiectasis     C. difficile diarrhea 03/11/2017    CAD (coronary artery disease)     nonobstructive    Chronic diastolic CHF (congestive heart failure)     Colitis     Cough     Cryoglobulinemia     Dyspnea on exertion     Fall     Hyperlipidemia     Hypertension     Hyponatremia     Hypoxia     Infectious viral hepatitis     AGE 13    Left shoulder pain     Leg swelling     Lesion of lung     Malignant hyperthermia due to anesthesia     Mild tricuspid regurgitation     MR (mitral regurgitation)     mild    MVP (mitral valve prolapse)     Permanent atrial fibrillation     Pneumonia with the fungal infection aspergillosis 01/06/2017    Pneumothorax     SOB (shortness of breath)     UTI (urinary tract infection)     Wheeze     mild       Allergies   Allergen Reactions    Amlodipine Besylate Swelling    Aspirin GI Intolerance     Caused bleeding ulcers    Bactrim [Sulfamethoxazole-Trimethoprim] Nausea And Vomiting    Erythromycin  Unknown (See Comments)     Patient does not recall type of reaction.    Levaquin [Levofloxacin] Unknown (See Comments)     Nausea      Nitrofurantoin Nausea Only and Nausea And Vomiting    Ramipril Other (See Comments)     Cough       Past Surgical History:   Procedure Laterality Date    BRONCHOSCOPY N/A 11/12/2016    Procedure: BRONCHOSCOPY WITH FLUORO, BRUSHINGS, BAL, AND BIOPSIES;  Surgeon: Rogelio Tucker MD;  Location: [facility] ENDOSCOPY;  Service:     BRONCHOSCOPY Bilateral 06/03/2017    Procedure: BRONCHOSCOPY with BAL ;  Surgeon: Sung King MD;  Location: [facility] ENDOSCOPY;  Service:     BRONCHOSCOPY N/A 12/17/2019    Procedure: BRONCHOSCOPY WITH WASHINGS;  Surgeon: Rogelio Tucker MD;  Location: [facility] ENDOSCOPY;  Service: Pulmonary    BRONCHOSCOPY N/A 07/15/2022    Procedure: BRONCHOSCOPY;  Surgeon: Rogelio Tucker MD;  Location: [facility] ENDOSCOPY;  Service: Pulmonary;  Laterality: N/A;  Pre: Pneumonia  Post: Pneumonia    BRONCHOSCOPY N/A 10/2/2023    Procedure: BRONCHOSCOPY WITH BRONCHIAL AVEOLAR LAVAGE;  Surgeon: Rogelio Tucker MD;  Location: [facility] ENDOSCOPY;  Service: Pulmonary;  Laterality: N/A;  PRE:BRONCHIECTASIS /   POST: SAME    CATARACT EXTRACTION EXTRACAPSULAR W/ INTRAOCULAR LENS IMPLANTATION      COLONOSCOPY      2013    D & C WITH SUCTION      HYSTERECTOMY      KNEE ARTHROSCOPY Left     Partial       Family History   Problem Relation Age of Onset    Hypertension Mother     Stroke Mother     Heart disease Father     Hypertension Father     Cancer Brother     Cancer Son        Social History     Socioeconomic History    Marital status:    Tobacco Use    Smoking status: Never    Smokeless tobacco: Never    Tobacco comments:     caffiene daily   Vaping Use    Vaping Use: Never used   Substance and Sexual Activity    Alcohol use: Not Currently     Alcohol/week: 2.0 standard drinks of alcohol     Types: 1 Glasses of wine, 1 Shots of liquor per week    Drug use: No    Sexual activity: Defer     Partners:  Male           Objective   Physical Exam  Vitals and nursing note reviewed.   Constitutional:       Appearance: Normal appearance.   HENT:      Head: Normocephalic and atraumatic.   Cardiovascular:      Rate and Rhythm: Normal rate and regular rhythm.   Pulmonary:      Effort: Pulmonary effort is normal.      Breath sounds: Normal breath sounds.   Abdominal:      General: Abdomen is flat.      Palpations: Abdomen is soft.      Tenderness: There is no abdominal tenderness.   Musculoskeletal:      Cervical back: Normal range of motion and neck supple.   Skin:     General: Skin is warm and dry.      Capillary Refill: Capillary refill takes less than 2 seconds.   Neurological:      General: No focal deficit present.      Mental Status: She is alert and oriented to person, place, and time.         Procedures           ED Course                                           Medical Decision Making  NIH of 0.  Low suspicion for CVA.  Patient is alert and oriented, answering all questions appropriately.  Negative UA, laboratory findings otherwise unremarkable.  Patient is afebrile, no leukocytosis.  Vital signs all reassuring.  Possible early dementia, offered head CT and full workup including possible admission but daughter and patient both declined.  She is follow-up with her primary care and I will give them neurology follow-up in case they would like to pursue this further.  She is nontoxic and was given strict return precautions.    Amount and/or Complexity of Data Reviewed  Labs: ordered.    Risk  Prescription drug management.        Final diagnoses:   Intermittent confusion       ED Disposition  ED Disposition       ED Disposition   Discharge    Condition   Stable    Comment   --               [facility] NEUR[facility]  [address] [id number] [phone]  Schedule an appointment as soon as possible for a visit   If symptoms worsen         Medication List        Changed       FeroSul 325 (65 Fe) MG tablet  Generic drug: ferrous sulfate  TAKE ONE TABLET BY MOUTH DAILY WITH BREAKFAST  What changed:   how much to take  when to take this

## 2024-04-23 RX ORDER — BUMETANIDE 2 MG/1
2 TABLET ORAL 2 TIMES DAILY
Qty: 180 TABLET | Refills: 1 | Status: SHIPPED | OUTPATIENT
Start: 2024-04-23

## 2024-05-06 RX ORDER — CITALOPRAM 20 MG/1
TABLET ORAL
Qty: 30 TABLET | Refills: 1 | Status: SHIPPED | OUTPATIENT
Start: 2024-05-06

## 2024-05-29 ENCOUNTER — TRANSCRIBE ORDERS (OUTPATIENT)
Dept: ADMINISTRATIVE | Facility: HOSPITAL | Age: 89
End: 2024-05-29
Payer: MEDICARE

## 2024-05-29 DIAGNOSIS — R91.8 PULMONARY INFILTRATE: Primary | ICD-10-CM

## 2024-07-01 RX ORDER — BUMETANIDE 2 MG/1
2 TABLET ORAL 2 TIMES DAILY
Qty: 180 TABLET | Refills: 0 | Status: SHIPPED | OUTPATIENT
Start: 2024-07-01

## 2024-07-01 NOTE — TELEPHONE ENCOUNTER
Caller: Michelle Zladivar    Relationship: Emergency Contact    Best call back number: [phone]     Requested Prescriptions:   Requested Prescriptions     Pending Prescriptions Disp Refills    bumetanide (BUMEX) 2 MG tablet 180 tablet 1     Sig: Take 1 tablet by mouth 2 (Two) Times a Day. Indications: Cardiac Failure, Edema        Pharmacy where request should be sent: [id number] [address] AT [facility] [phone] [facility] [phone]      Last office visit with prescribing clinician: 12/13/2023   Last telemedicine visit with prescribing clinician: Visit date not found   Next office visit with prescribing clinician: Visit date not found     Additional details provided by patient: PATIENT'S DAUGHTER STATED THIS IS A PRESCRIPTION THAT DR. COTTER HAS PRESCRIBED AND SHE IS NEEDING A REFILL.     Does the patient have less than a 3 day supply:  [] Yes  [x] No    Would you like a call back once the refill request has been completed: [] Yes [x] No    If the office needs to give you a call back, can they leave a voicemail: [] Yes [x] No    Dennise sOeguera Rep   07/01/24 12:22 EDT

## 2024-07-10 ENCOUNTER — APPOINTMENT (OUTPATIENT)
Dept: GENERAL RADIOLOGY | Facility: HOSPITAL | Age: 89
End: 2024-07-10
Payer: MEDICARE

## 2024-07-10 ENCOUNTER — HOSPITAL ENCOUNTER (INPATIENT)
Facility: HOSPITAL | Age: 89
LOS: 1 days | Discharge: HOME | End: 2024-07-13
Attending: EMERGENCY MEDICINE | Admitting: INTERNAL MEDICINE
Payer: MEDICARE

## 2024-07-10 ENCOUNTER — APPOINTMENT (OUTPATIENT)
Dept: CT IMAGING | Facility: HOSPITAL | Age: 89
End: 2024-07-10
Payer: MEDICARE

## 2024-07-10 DIAGNOSIS — R79.89 ELEVATED TROPONIN: ICD-10-CM

## 2024-07-10 DIAGNOSIS — I50.32 CHRONIC DIASTOLIC CHF (CONGESTIVE HEART FAILURE): ICD-10-CM

## 2024-07-10 DIAGNOSIS — Z86.79 HISTORY OF ATRIAL FIBRILLATION: ICD-10-CM

## 2024-07-10 DIAGNOSIS — D72.829 LEUKOCYTOSIS, UNSPECIFIED TYPE: ICD-10-CM

## 2024-07-10 DIAGNOSIS — R78.89 ELEVATED DIGOXIN LEVEL: ICD-10-CM

## 2024-07-10 DIAGNOSIS — N17.9 ACUTE KIDNEY INJURY: ICD-10-CM

## 2024-07-10 DIAGNOSIS — R55 SYNCOPE AND COLLAPSE: Primary | ICD-10-CM

## 2024-07-10 LAB
ALBUMIN SERPL-MCNC: 4 G/DL (ref 3.5–5.2)
ALBUMIN/GLOB SERPL: 1.3 G/DL
ALP SERPL-CCNC: 109 U/L (ref 39–117)
ALT SERPL W P-5'-P-CCNC: 25 U/L (ref 1–33)
ANION GAP SERPL CALCULATED.3IONS-SCNC: 15.4 MMOL/L (ref 5–15)
AST SERPL-CCNC: 25 U/L (ref 1–32)
BASOPHILS # BLD AUTO: 0.17 10*3/MM3 (ref 0–0.2)
BASOPHILS NFR BLD AUTO: 0.9 % (ref 0–1.5)
BILIRUB SERPL-MCNC: 0.8 MG/DL (ref 0–1.2)
BILIRUB UR QL STRIP: NEGATIVE
BUN SERPL-MCNC: 16 MG/DL (ref 8–23)
BUN/CREAT SERPL: 12.3 (ref 7–25)
CALCIUM SPEC-SCNC: 9.5 MG/DL (ref 8.2–9.6)
CHLORIDE SERPL-SCNC: 96 MMOL/L (ref 98–107)
CLARITY UR: CLEAR
CO2 SERPL-SCNC: 27.6 MMOL/L (ref 22–29)
COLOR UR: YELLOW
CREAT SERPL-MCNC: 1.3 MG/DL (ref 0.57–1)
D-LACTATE SERPL-SCNC: 1.6 MMOL/L (ref 0.5–2)
D-LACTATE SERPL-SCNC: 2.3 MMOL/L (ref 0.5–2)
DEPRECATED RDW RBC AUTO: 50.1 FL (ref 37–54)
DIGOXIN SERPL-MCNC: 1.5 NG/ML (ref 0.6–1.2)
EGFRCR SERPLBLD CKD-EPI 2021: 38.4 ML/MIN/1.73
EOSINOPHIL # BLD AUTO: 0.13 10*3/MM3 (ref 0–0.4)
EOSINOPHIL NFR BLD AUTO: 0.7 % (ref 0.3–6.2)
ERYTHROCYTE [DISTWIDTH] IN BLOOD BY AUTOMATED COUNT: 13.8 % (ref 12.3–15.4)
GEN 5 2HR TROPONIN T REFLEX: 42 NG/L
GLOBULIN UR ELPH-MCNC: 3.2 GM/DL
GLUCOSE SERPL-MCNC: 179 MG/DL (ref 65–99)
GLUCOSE UR STRIP-MCNC: NEGATIVE MG/DL
HCT VFR BLD AUTO: 40.7 % (ref 34–46.6)
HGB BLD-MCNC: 12.7 G/DL (ref 12–15.9)
HGB UR QL STRIP.AUTO: NEGATIVE
IMM GRANULOCYTES # BLD AUTO: 0.52 10*3/MM3 (ref 0–0.05)
IMM GRANULOCYTES NFR BLD AUTO: 2.8 % (ref 0–0.5)
INR PPP: 1.19 (ref 0.9–1.1)
KETONES UR QL STRIP: NEGATIVE
LEUKOCYTE ESTERASE UR QL STRIP.AUTO: NEGATIVE
LYMPHOCYTES # BLD AUTO: 1.2 10*3/MM3 (ref 0.7–3.1)
LYMPHOCYTES NFR BLD AUTO: 6.4 % (ref 19.6–45.3)
MAGNESIUM SERPL-MCNC: 2.4 MG/DL (ref 1.7–2.3)
MCH RBC QN AUTO: 31.3 PG (ref 26.6–33)
MCHC RBC AUTO-ENTMCNC: 31.2 G/DL (ref 31.5–35.7)
MCV RBC AUTO: 100.2 FL (ref 79–97)
MONOCYTES # BLD AUTO: 1.01 10*3/MM3 (ref 0.1–0.9)
MONOCYTES NFR BLD AUTO: 5.4 % (ref 5–12)
NEUTROPHILS NFR BLD AUTO: 15.58 10*3/MM3 (ref 1.7–7)
NEUTROPHILS NFR BLD AUTO: 83.8 % (ref 42.7–76)
NITRITE UR QL STRIP: NEGATIVE
NRBC BLD AUTO-RTO: 1.5 /100 WBC (ref 0–0.2)
PH UR STRIP.AUTO: 6.5 [PH] (ref 5–8)
PLATELET # BLD AUTO: 257 10*3/MM3 (ref 140–450)
PMV BLD AUTO: 10.7 FL (ref 6–12)
POTASSIUM SERPL-SCNC: 3.9 MMOL/L (ref 3.5–5.2)
PROCALCITONIN SERPL-MCNC: 0.09 NG/ML (ref 0–0.25)
PROT SERPL-MCNC: 7.2 G/DL (ref 6–8.5)
PROT UR QL STRIP: ABNORMAL
PROTHROMBIN TIME: 15.3 SECONDS (ref 11.7–14.2)
QT INTERVAL: 337 MS
QTC INTERVAL: 389 MS
RBC # BLD AUTO: 4.06 10*6/MM3 (ref 3.77–5.28)
SODIUM SERPL-SCNC: 139 MMOL/L (ref 136–145)
SP GR UR STRIP: 1.01 (ref 1–1.03)
T4 FREE SERPL-MCNC: 1.26 NG/DL (ref 0.92–1.68)
TROPONIN T DELTA: -10 NG/L
TROPONIN T SERPL HS-MCNC: 52 NG/L
TSH SERPL DL<=0.05 MIU/L-ACNC: 4.79 UIU/ML (ref 0.27–4.2)
UROBILINOGEN UR QL STRIP: ABNORMAL
WBC NRBC COR # BLD AUTO: 18.61 10*3/MM3 (ref 3.4–10.8)

## 2024-07-10 PROCEDURE — G0378 HOSPITAL OBSERVATION PER HR: HCPCS

## 2024-07-10 PROCEDURE — 80053 COMPREHEN METABOLIC PANEL: CPT | Performed by: EMERGENCY MEDICINE

## 2024-07-10 PROCEDURE — 93005 ELECTROCARDIOGRAM TRACING: CPT | Performed by: EMERGENCY MEDICINE

## 2024-07-10 PROCEDURE — 84439 ASSAY OF FREE THYROXINE: CPT | Performed by: EMERGENCY MEDICINE

## 2024-07-10 PROCEDURE — 25810000003 SODIUM CHLORIDE 0.9 % SOLUTION: Performed by: EMERGENCY MEDICINE

## 2024-07-10 PROCEDURE — 84484 ASSAY OF TROPONIN QUANT: CPT | Performed by: EMERGENCY MEDICINE

## 2024-07-10 PROCEDURE — 83605 ASSAY OF LACTIC ACID: CPT | Performed by: EMERGENCY MEDICINE

## 2024-07-10 PROCEDURE — 80162 ASSAY OF DIGOXIN TOTAL: CPT | Performed by: EMERGENCY MEDICINE

## 2024-07-10 PROCEDURE — 85025 COMPLETE CBC W/AUTO DIFF WBC: CPT | Performed by: EMERGENCY MEDICINE

## 2024-07-10 PROCEDURE — 99285 EMERGENCY DEPT VISIT HI MDM: CPT

## 2024-07-10 PROCEDURE — 93010 ELECTROCARDIOGRAM REPORT: CPT | Performed by: INTERNAL MEDICINE

## 2024-07-10 PROCEDURE — 85610 PROTHROMBIN TIME: CPT | Performed by: EMERGENCY MEDICINE

## 2024-07-10 PROCEDURE — 81003 URINALYSIS AUTO W/O SCOPE: CPT | Performed by: EMERGENCY MEDICINE

## 2024-07-10 PROCEDURE — 70450 CT HEAD/BRAIN W/O DYE: CPT

## 2024-07-10 PROCEDURE — 84145 PROCALCITONIN (PCT): CPT | Performed by: EMERGENCY MEDICINE

## 2024-07-10 PROCEDURE — 36415 COLL VENOUS BLD VENIPUNCTURE: CPT | Performed by: EMERGENCY MEDICINE

## 2024-07-10 PROCEDURE — 71045 X-RAY EXAM CHEST 1 VIEW: CPT

## 2024-07-10 PROCEDURE — 83735 ASSAY OF MAGNESIUM: CPT | Performed by: EMERGENCY MEDICINE

## 2024-07-10 PROCEDURE — 84443 ASSAY THYROID STIM HORMONE: CPT | Performed by: EMERGENCY MEDICINE

## 2024-07-10 RX ORDER — UREA 10 %
2.5 LOTION (ML) TOPICAL NIGHTLY PRN
Status: DISCONTINUED | OUTPATIENT
Start: 2024-07-10 | End: 2024-07-13 | Stop reason: HOSPADM

## 2024-07-10 RX ORDER — AMOXICILLIN 250 MG
2 CAPSULE ORAL 2 TIMES DAILY PRN
Status: DISCONTINUED | OUTPATIENT
Start: 2024-07-10 | End: 2024-07-13 | Stop reason: HOSPADM

## 2024-07-10 RX ORDER — CARVEDILOL 3.12 MG/1
3.12 TABLET ORAL 2 TIMES DAILY WITH MEALS
Status: DISCONTINUED | OUTPATIENT
Start: 2024-07-10 | End: 2024-07-13 | Stop reason: HOSPADM

## 2024-07-10 RX ORDER — PREDNISONE 10 MG/1
10 TABLET ORAL DAILY
Status: DISCONTINUED | OUTPATIENT
Start: 2024-07-11 | End: 2024-07-13 | Stop reason: HOSPADM

## 2024-07-10 RX ORDER — ACETAMINOPHEN 650 MG/1
650 SUPPOSITORY RECTAL EVERY 4 HOURS PRN
Status: DISCONTINUED | OUTPATIENT
Start: 2024-07-10 | End: 2024-07-13 | Stop reason: HOSPADM

## 2024-07-10 RX ORDER — L.ACID,PARA/B.BIFIDUM/S.THERM 8B CELL
1 CAPSULE ORAL DAILY
Status: DISCONTINUED | OUTPATIENT
Start: 2024-07-11 | End: 2024-07-13 | Stop reason: HOSPADM

## 2024-07-10 RX ORDER — BUDESONIDE AND FORMOTEROL FUMARATE DIHYDRATE 160; 4.5 UG/1; UG/1
2 AEROSOL RESPIRATORY (INHALATION)
Status: DISCONTINUED | OUTPATIENT
Start: 2024-07-10 | End: 2024-07-13 | Stop reason: HOSPADM

## 2024-07-10 RX ORDER — ALBUTEROL SULFATE 2.5 MG/3ML
2.5 SOLUTION RESPIRATORY (INHALATION) EVERY 6 HOURS PRN
Status: DISCONTINUED | OUTPATIENT
Start: 2024-07-10 | End: 2024-07-13 | Stop reason: HOSPADM

## 2024-07-10 RX ORDER — ACETAMINOPHEN 325 MG/1
650 TABLET ORAL EVERY 4 HOURS PRN
Status: DISCONTINUED | OUTPATIENT
Start: 2024-07-10 | End: 2024-07-13 | Stop reason: HOSPADM

## 2024-07-10 RX ORDER — BISACODYL 10 MG
10 SUPPOSITORY, RECTAL RECTAL DAILY PRN
Status: DISCONTINUED | OUTPATIENT
Start: 2024-07-10 | End: 2024-07-13 | Stop reason: HOSPADM

## 2024-07-10 RX ORDER — IPRATROPIUM BROMIDE AND ALBUTEROL SULFATE 2.5; .5 MG/3ML; MG/3ML
3 SOLUTION RESPIRATORY (INHALATION) 2 TIMES DAILY
Status: DISCONTINUED | OUTPATIENT
Start: 2024-07-10 | End: 2024-07-13 | Stop reason: HOSPADM

## 2024-07-10 RX ORDER — POLYETHYLENE GLYCOL 3350 17 G/17G
17 POWDER, FOR SOLUTION ORAL DAILY PRN
Status: DISCONTINUED | OUTPATIENT
Start: 2024-07-10 | End: 2024-07-13 | Stop reason: HOSPADM

## 2024-07-10 RX ORDER — SODIUM CHLORIDE 0.9 % (FLUSH) 0.9 %
10 SYRINGE (ML) INJECTION AS NEEDED
Status: DISCONTINUED | OUTPATIENT
Start: 2024-07-10 | End: 2024-07-13 | Stop reason: HOSPADM

## 2024-07-10 RX ORDER — ONDANSETRON 4 MG/1
4 TABLET, ORALLY DISINTEGRATING ORAL EVERY 6 HOURS PRN
Status: DISCONTINUED | OUTPATIENT
Start: 2024-07-10 | End: 2024-07-13 | Stop reason: HOSPADM

## 2024-07-10 RX ORDER — ONDANSETRON 2 MG/ML
4 INJECTION INTRAMUSCULAR; INTRAVENOUS EVERY 6 HOURS PRN
Status: DISCONTINUED | OUTPATIENT
Start: 2024-07-10 | End: 2024-07-13 | Stop reason: HOSPADM

## 2024-07-10 RX ORDER — GUAIFENESIN 600 MG/1
600 TABLET, EXTENDED RELEASE ORAL 2 TIMES DAILY
Status: DISCONTINUED | OUTPATIENT
Start: 2024-07-10 | End: 2024-07-13 | Stop reason: HOSPADM

## 2024-07-10 RX ORDER — BISACODYL 5 MG/1
5 TABLET, DELAYED RELEASE ORAL DAILY PRN
Status: DISCONTINUED | OUTPATIENT
Start: 2024-07-10 | End: 2024-07-13 | Stop reason: HOSPADM

## 2024-07-10 RX ORDER — AZITHROMYCIN 250 MG/1
250 TABLET, FILM COATED ORAL DAILY
Status: COMPLETED | OUTPATIENT
Start: 2024-07-11 | End: 2024-07-13

## 2024-07-10 RX ORDER — ACETAMINOPHEN 160 MG/5ML
650 SOLUTION ORAL EVERY 4 HOURS PRN
Status: DISCONTINUED | OUTPATIENT
Start: 2024-07-10 | End: 2024-07-13 | Stop reason: HOSPADM

## 2024-07-10 RX ORDER — CITALOPRAM 20 MG/1
20 TABLET ORAL DAILY
Status: DISCONTINUED | OUTPATIENT
Start: 2024-07-11 | End: 2024-07-13 | Stop reason: HOSPADM

## 2024-07-10 RX ADMIN — GUAIFENESIN 600 MG: 600 TABLET, EXTENDED RELEASE ORAL at 21:36

## 2024-07-10 RX ADMIN — APIXABAN 2.5 MG: 2.5 TABLET, FILM COATED ORAL at 21:36

## 2024-07-10 RX ADMIN — CARVEDILOL 3.12 MG: 3.12 TABLET, FILM COATED ORAL at 21:36

## 2024-07-10 RX ADMIN — SODIUM CHLORIDE 500 ML: 9 INJECTION, SOLUTION INTRAVENOUS at 13:49

## 2024-07-10 NOTE — ED PROVIDER NOTES
EMERGENCY DEPARTMENT ENCOUNTER    Room Number:  38/38  Date seen:  7/10/2024  PCP: Matt Rose MD  Historian: Patient and EMS and family      HPI:  Chief Complaint: Syncope  Context: Agnieszka Rizzo is a 89 y.o. female who presents to the ED via EMS for syncope.  The patient is a history of coronary artery disease and A-fib.  The patient was taking a shower this morning but when she got out she became acutely weak.  The daughter placed the patient on the toilet.  The patient then felt weak, nauseated and became diaphoretic and passed out.  The patient did lose control of her bowel but did not have any tonic-clonic activity.  The daughter then put her back in the shower to clean her up and the patient had a second episode.  The daughter states she put a pulse oximeter on the patient but does not think it read right and showed a heart rate over 200.  Per EMS report the patient had no postictal state.  Currently the patient denies complaints.  She denies headache, chest pain, shortness of breath, palpitations, nausea, vomiting or diarrhea.  She denies any focal deficits.  The patient states she remembers taking a shower and going to the bathroom but does not remember what happened after that.      PAST MEDICAL HISTORY  Active Ambulatory Problems     Diagnosis Date Noted    Benign essential hypertension 12/01/2016    Chronic coronary artery disease 12/01/2016    Familial hypercholesterolemia 12/01/2016    Mitral valve insufficiency 12/01/2016    Ventricular premature beats 12/01/2016    Ventricular tachycardia 12/01/2016    Chronic respiratory failure with hypoxia 01/09/2017    Acid-fast bacteria present 01/09/2017    DNR (do not resuscitate) 03/12/2017    Acute on chronic diastolic heart failure 03/12/2017    Asymptomatic bacteriuria 04/03/2017    Iron deficiency anemia 04/04/2017    Hypokalemia 04/04/2017    Bronchiectasis 04/17/2017    Pneumonia of both lungs due to infectious organism 05/31/2017    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    Permanent atrial fibrillation 06/09/2017    Anxiety 06/20/2017    Exposure to hepatitis A 04/20/2018    Medicare annual wellness visit, subsequent 04/18/2019    Abdominal pain 09/23/2019    Herpes infection 11/19/2019    Moderate asthma with acute exacerbation 12/02/2019    Bronchitis, acute, with bronchospasm 12/16/2019    Abnormal EKG 12/16/2019    Fall     Laceration of left upper extremity 07/16/2021    Multiple skin tears 07/16/2021    Alteration in anticoagulation     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    Clinical diagnosis of COVID-19 01/04/2022    Pneumonia of right lung due to infectious organism, unspecified part of lung 07/09/2022    COPD (chronic obstructive pulmonary disease)     Dizziness 09/09/2022    Bronchiectasis 02/20/2023    Sepsis due to pneumonia 04/03/2023    Acute pulmonary edema 08/04/2023    Electrolyte imbalance 08/16/2023    CHF (congestive heart failure) 11/26/2023    Chronic diastolic CHF (congestive heart failure) 11/27/2023     Resolved Ambulatory Problems     Diagnosis Date Noted    Pneumothorax of right lung after biopsy 11/12/2016    Pulmonary aspergillosis 11/16/2016    Heart failure, diastolic, with acute decompensation 12/01/2016    Persistent atrial fibrillation 12/01/2016    Pneumonia 01/01/2017    Pneumonia with the fungal infection aspergillosis 01/06/2017    Hyponatremia 02/08/2017    C. difficile colitis 03/11/2017    Sepsis 03/12/2017    CHF (congestive heart failure) 03/22/2017    Pancolitis 04/02/2017    Acute bronchitis due to parainfluenza virus 12/16/2019    COPD with acute exacerbation  12/16/2019    UTI (urinary tract infection) 12/19/2019    Acute on chronic diastolic congestive heart failure 10/04/2023    Acute on chronic diastolic heart failure 10/05/2023     Past Medical History:   Diagnosis Date    Aspergillus     Asthma     Atrial fibrillation     Atrial flutter     C. difficile diarrhea 03/11/2017    CAD (coronary  artery disease)     Colitis     Cough     Cryoglobulinemia     Dyspnea on exertion     Hyperlipidemia     Hypertension     Hypoxia     Infectious viral hepatitis     Left shoulder pain     Leg swelling     Lesion of lung     Malignant hyperthermia due to anesthesia     Mild tricuspid regurgitation     MR (mitral regurgitation)     MVP (mitral valve prolapse)     Pneumothorax     SOB (shortness of breath)     Wheeze          REVIEW OF SYSTEMS  All systems reviewed and negative except for those discussed in HPI.       PAST SURGICAL HISTORY  Past Surgical History:   Procedure Laterality Date    BRONCHOSCOPY N/A 11/12/2016    Procedure: BRONCHOSCOPY WITH FLUORO, BRUSHINGS, BAL, AND BIOPSIES;  Surgeon: Rogelio Tuckre MD;  Location: [facility] ENDOSCOPY;  Service:     BRONCHOSCOPY Bilateral 06/03/2017    Procedure: BRONCHOSCOPY with BAL ;  Surgeon: Sung King MD;  Location: [facility] ENDOSCOPY;  Service:     BRONCHOSCOPY N/A 12/17/2019    Procedure: BRONCHOSCOPY WITH WASHINGS;  Surgeon: Rogelio Tucker MD;  Location: [facility] ENDOSCOPY;  Service: Pulmonary    BRONCHOSCOPY N/A 07/15/2022    Procedure: BRONCHOSCOPY;  Surgeon: Rogelio Tucker MD;  Location: [facility] ENDOSCOPY;  Service: Pulmonary;  Laterality: N/A;  Pre: Pneumonia  Post: Pneumonia    BRONCHOSCOPY N/A 10/2/2023    Procedure: BRONCHOSCOPY WITH BRONCHIAL AVEOLAR LAVAGE;  Surgeon: Rogelio Tucker MD;  Location: [facility] ENDOSCOPY;  Service: Pulmonary;  Laterality: N/A;  PRE:BRONCHIECTASIS /   POST: SAME    CATARACT EXTRACTION EXTRACAPSULAR W/ INTRAOCULAR LENS IMPLANTATION      COLONOSCOPY      2013    D & C WITH SUCTION      HYSTERECTOMY      KNEE ARTHROSCOPY Left     Partial         FAMILY HISTORY  Family History   Problem Relation Age of Onset    Hypertension Mother     Stroke Mother     Heart disease Father     Hypertension Father     Cancer Brother     Cancer Son          SOCIAL HISTORY  Social History     Socioeconomic History    Marital status:    Tobacco Use    Smoking  status: Never    Smokeless tobacco: Never    Tobacco comments:     caffiene daily   Vaping Use    Vaping status: Never Used   Substance and Sexual Activity    Alcohol use: Not Currently     Alcohol/week: 2.0 standard drinks of alcohol     Types: 1 Glasses of wine, 1 Shots of liquor per week    Drug use: No    Sexual activity: Defer     Partners: Male         ALLERGIES  Amlodipine besylate, Aspirin, Bactrim [sulfamethoxazole-trimethoprim], Erythromycin, Levaquin [levofloxacin], Nitrofurantoin, and Ramipril      PHYSICAL EXAM  ED Triage Vitals   Temp Heart Rate Resp BP SpO2   07/10/24 1230 07/10/24 1230 07/10/24 1230 07/10/24 1230 07/10/24 1230   98.2 °F (36.8 °C) 85 16 117/66 98 %      Temp src Heart Rate Source Patient Position BP Location FiO2 (%)   07/10/24 1230 07/10/24 1230 07/10/24 1306 -- --   Tympanic Monitor Lying         Physical Exam      GENERAL: 89-year-old female in no acute distress  HENT: NCAT: nares patent: Neck supple  EYES: no scleral icterus  CV: Irregularly irregular in the 90s  RESPIRATORY: normal effort  ABDOMEN: soft, NTND: Bowel sounds positive  MUSCULOSKELETAL: no deformity  NEURO: alert with nonfocal neuro exam  PSYCH:  calm, cooperative  SKIN: warm, dry    Vital signs and nursing notes reviewed.      LAB RESULTS  Recent Results (from the past 24 hour(s))   ECG 12 Lead Syncope    Collection Time: 07/10/24 12:39 PM   Result Value Ref Range    QT Interval 337 ms    QTC Interval 389 ms   Comprehensive Metabolic Panel    Collection Time: 07/10/24  1:04 PM    Specimen: Blood   Result Value Ref Range    Glucose 179 (H) 65 - 99 mg/dL    BUN 16 8 - 23 mg/dL    Creatinine 1.30 (H) 0.57 - 1.00 mg/dL    Sodium 139 136 - 145 mmol/L    Potassium 3.9 3.5 - 5.2 mmol/L    Chloride 96 (L) 98 - 107 mmol/L    CO2 27.6 22.0 - 29.0 mmol/L    Calcium 9.5 8.2 - 9.6 mg/dL    Total Protein 7.2 6.0 - 8.5 g/dL    Albumin 4.0 3.5 - 5.2 g/dL    ALT (SGPT) 25 1 - 33 U/L    AST (SGOT) 25 1 - 32 U/L    Alkaline  Phosphatase 109 39 - 117 U/L    Total Bilirubin 0.8 0.0 - 1.2 mg/dL    Globulin 3.2 gm/dL    A/G Ratio 1.3 g/dL    BUN/Creatinine Ratio 12.3 7.0 - 25.0    Anion Gap 15.4 (H) 5.0 - 15.0 mmol/L    eGFR 38.4 (L) >60.0 mL/min/1.73   Protime-INR    Collection Time: 07/10/24  1:04 PM    Specimen: Blood   Result Value Ref Range    Protime 15.3 (H) 11.7 - 14.2 Seconds    INR 1.19 (H) 0.90 - 1.10   High Sensitivity Troponin T    Collection Time: 07/10/24  1:04 PM    Specimen: Blood   Result Value Ref Range    HS Troponin T 52 (H) <14 ng/L   Digoxin Level    Collection Time: 07/10/24  1:04 PM    Specimen: Blood   Result Value Ref Range    Digoxin 1.50 (H) 0.60 - 1.20 ng/mL   Magnesium    Collection Time: 07/10/24  1:04 PM    Specimen: Blood   Result Value Ref Range    Magnesium 2.4 (H) 1.7 - 2.3 mg/dL   TSH    Collection Time: 07/10/24  1:04 PM    Specimen: Blood   Result Value Ref Range    TSH 4.790 (H) 0.270 - 4.200 uIU/mL   T4, Free    Collection Time: 07/10/24  1:04 PM    Specimen: Blood   Result Value Ref Range    Free T4 1.26 0.92 - 1.68 ng/dL   CBC Auto Differential    Collection Time: 07/10/24  1:04 PM    Specimen: Blood   Result Value Ref Range    WBC 18.61 (H) 3.40 - 10.80 10*3/mm3    RBC 4.06 3.77 - 5.28 10*6/mm3    Hemoglobin 12.7 12.0 - 15.9 g/dL    Hematocrit 40.7 34.0 - 46.6 %    .2 (H) 79.0 - 97.0 fL    MCH 31.3 26.6 - 33.0 pg    MCHC 31.2 (L) 31.5 - 35.7 g/dL    RDW 13.8 12.3 - 15.4 %    RDW-SD 50.1 37.0 - 54.0 fl    MPV 10.7 6.0 - 12.0 fL    Platelets 257 140 - 450 10*3/mm3    Neutrophil % 83.8 (H) 42.7 - 76.0 %    Lymphocyte % 6.4 (L) 19.6 - 45.3 %    Monocyte % 5.4 5.0 - 12.0 %    Eosinophil % 0.7 0.3 - 6.2 %    Basophil % 0.9 0.0 - 1.5 %    Immature Grans % 2.8 (H) 0.0 - 0.5 %    Neutrophils, Absolute 15.58 (H) 1.70 - 7.00 10*3/mm3    Lymphocytes, Absolute 1.20 0.70 - 3.10 10*3/mm3    Monocytes, Absolute 1.01 (H) 0.10 - 0.90 10*3/mm3    Eosinophils, Absolute 0.13 0.00 - 0.40 10*3/mm3    Basophils,  Absolute 0.17 0.00 - 0.20 10*3/mm3    Immature Grans, Absolute 0.52 (H) 0.00 - 0.05 10*3/mm3    nRBC 1.5 (H) 0.0 - 0.2 /100 WBC   Procalcitonin    Collection Time: 07/10/24  1:04 PM    Specimen: Blood   Result Value Ref Range    Procalcitonin 0.09 0.00 - 0.25 ng/mL   High Sensitivity Troponin T 2Hr    Collection Time: 07/10/24  2:46 PM    Specimen: Blood   Result Value Ref Range    HS Troponin T 42 (H) <14 ng/L    Troponin T Delta -10 (L) >=-4 - <+4 ng/L   Lactic Acid, Plasma    Collection Time: 07/10/24  2:47 PM    Specimen: Blood   Result Value Ref Range    Lactate 2.3 (C) 0.5 - 2.0 mmol/L   Urinalysis With Microscopic If Indicated (No Culture) - Urine, Clean Catch    Collection Time: 07/10/24  3:51 PM    Specimen: Urine, Clean Catch   Result Value Ref Range    Color, UA Yellow Yellow, Straw    Appearance, UA Clear Clear    pH, UA 6.5 5.0 - 8.0    Specific Gravity, UA 1.011 1.005 - 1.030    Glucose, UA Negative Negative    Ketones, UA Negative Negative    Bilirubin, UA Negative Negative    Blood, UA Negative Negative    Protein, UA Trace (A) Negative    Leuk Esterase, UA Negative Negative    Nitrite, UA Negative Negative    Urobilinogen, UA 0.2 E.U./dL 0.2 - 1.0 E.U./dL       Ordered the above labs and reviewed the results.        RADIOLOGY  CT Head Without Contrast    Result Date: 7/10/2024  CT HEAD WITHOUT CONTRAST  HISTORY: Syncope.  COMPARISON: CT head 7/12/2021.  FINDINGS: There is mild diffuse atrophy, age-appropriate. Moderate vascular calcification and mild to moderate small vessel ischemic disease is noted. There is no evidence of hemorrhage, hydrocephalus, abnormal extra-axial fluid or of acute infarction. Further evaluation could be performed with a MRI examination of the brain as indicated.    Radiation dose reduction techniques were utilized, including automated exposure control and exposure modulation based on body size.   This report was finalized on 7/10/2024 2:49 PM by Dr. Lon Velasquez M.D on  Workstation: [id number]      XR Chest 1 View    Result Date: 7/10/2024  XR CHEST 1 VW-  INDICATION: Shortness of breath  COMPARISON: CT chest 12/28/2023 and radiographs dating back to 1/22/2016      Stable linear atelectasis/scarring along the right minor fissure. No new focal consolidation. No pleural effusion or pneumothorax. Stable cardiomegaly. Nondilated distinct central pulmonary vasculature without radiographic findings to suggest redistribution/edema. No focal osseous abnormality.  This report was finalized on 7/10/2024 2:37 PM by Dr. Karel Castañeda M.D on Workstation: [id number]       Ordered the above noted radiological studies. Reviewed by me in PACS.            PROCEDURES  Procedures          MEDICATIONS GIVEN IN ER  Medications   sodium chloride 0.9 % flush 10 mL (has no administration in time range)   sodium chloride 0.9 % bolus 500 mL (500 mL Intravenous New Bag 7/10/24 1349)             MEDICAL DECISION MAKING, PROGRESS, and CONSULTS    All labs have been independently reviewed by me.  All radiology studies have been reviewed by me and I have also reviewed the radiology report.   EKG's independently viewed and interpreted by me.  Discussion below represents my analysis of pertinent findings related to patient's condition, differential diagnosis, treatment plan and final disposition.      Additional sources:  - Discussed/ obtained information from independent historians: EMS states that the patient had just had a shower and was sitting on the toilet and had a large bowel movement and then had a witnessed syncopal episode.  They state there is no tonic-clonic movement and they did not witness any postictal state.    - External (non-ED) record review: I reviewed the patient's office visit with cardiology on 1/2/2024 where she was seen for her permanent A-fib.  The patient is on digoxin and carvedilol and Eliquis.    - Chronic or social conditions impacting care: Patient lives in assisted living  facility    - Shared decision making: After shared decision-making discussion to myself, the patient and her family we agree she needs to be admitted to the hospital for further evaluation and care      Orders placed during this visit:  Orders Placed This Encounter   Procedures    XR Chest 1 View    CT Head Without Contrast    Comprehensive Metabolic Panel    Protime-INR    High Sensitivity Troponin T    Digoxin Level    Magnesium    TSH    T4, Free    CBC Auto Differential    High Sensitivity Troponin T 2Hr    Lactic Acid, Plasma    Procalcitonin    Urinalysis With Microscopic If Indicated (No Culture) - Urine, Clean Catch    STAT Lactic Acid, Reflex    Monitor Blood Pressure    Orthostatic Vitals    Continuous Pulse Oximetry    LHA (on-call MD unless specified) Details    ECG 12 Lead Syncope    Insert Peripheral IV    Initiate Observation Status    CBC & Differential         Differential diagnosis:  My differential diagnosis includes but is not limited to vasovagal syncope, bradycardia, tachycardia, A-fib with RVR, acute coronary syndrome, orthostatic hypotension, vasodilatation or medication induced      Independent interpretation of labs, radiology studies, and discussions with consultants:  ED Course as of 07/10/24 1609   Wed Jul 10, 2024   1252 EKG    EKG time: 1239  Rhythm/Rate: A-fib at 80  LVH with secondary polarization abnormality  Lateral ST depression with T wave inversion that could be digoxin related    Similar compared to prior on 11/26/2023 except now the ST depressions are more pronounced    Interpreted Contemporaneously by me.  Independently viewed by me     [GP]   1318 The patient was orthostatic on standing with a systolic blood pressure dropped from 132-98 and the patient became dizzy.  I will give her an IV fluid bolus. [GP]   1408 Patient's digoxin level is elevated at 1.5. [GP]   1409 Patient's initial troponin is 52 which is up from her priors.  Will recheck it. [GP]   1409 Patient does  have an acute kidney injury of creatinine 1.3.  Her creatinine was normal 6 months ago. [GP]   1409 Patient's white count is elevated 18.6.  I will add a procalcitonin and lactic acid along with a chest x-ray and urinalysis. [GP]   1442 My independent interpretation the patient's chest x-ray is chronic changes with stable cardiomegaly and no pneumonia. [GP]   1454 My independent interpretation the patient's head CT is that she has chronic changes but no mass or hemorrhage [GP]   1535 Patient's lactic acid is mildly elevated at 2.3.  Her repeat troponin is 42 which gives her a delta of -10.  Currently her urinalysis is still pending.  I will consult the hospitalist for admission. [GP]   1607 The patient's urinalysis shows no signs of infection. [GP]   1607 I have discussed the case with Dr. Mooney from [facility].  She is aware the patient's syncopal episode, elevated digoxin level, creatinine, lactic acid and troponin and white count.  She will meet the patient to the hospital for further evaluation and care and cardiology consultation. [GP]      ED Course User Index  [GP] Ced Leonard MD               DIAGNOSIS  Final diagnoses:   Syncope and collapse   History of atrial fibrillation   Acute kidney injury   Leukocytosis, unspecified type   Elevated digoxin level   Elevated troponin         DISPOSITION  ADMISSION    Discussed treatment plan and reason for admission with pt/family and admitting physician.  Pt/family voiced understanding of the plan for admission for further testing/treatment as needed.            Latest Documented Vital Signs:  As of 16:09 EDT  BP- 123/84 HR- 79 Temp- 98.2 °F (36.8 °C) (Tympanic) O2 sat- 93%--      --------------------  Please note that portions of this were completed with a voice recognition program.       Note Disclaimer: At [facility], we believe that sharing information builds trust and better relationships. You are receiving this note because you are receiving care at [facility] or recently visited. It is possible you will see health information before a provider has talked with you about it. This kind of information can be easy to misunderstand. To help you fully understand what it means for your health, we urge you to discuss this note with your provider.             Ced Leonard MD  07/10/24 [id number]

## 2024-07-10 NOTE — PLAN OF CARE
Goal Outcome Evaluation:    Problem: Adult Inpatient Plan of Care  Goal: Absence of Hospital-Acquired Illness or Injury  Outcome: Ongoing, Not Progressing   Plan of Care Reviewed With: patient alert, room air, consulted wound care, no c/o on this shift

## 2024-07-10 NOTE — ED NOTES
Nursing report ED to floor  Agnieszka Rizzo  89 y.o.  female    HPI :  HPI (Adult)  Stated Reason for Visit: Syncope  History Obtained From: EMS    Chief Complaint  Chief Complaint   Patient presents with    Syncope       Admitting doctor:   Carlene Mooney MD    Admitting diagnosis:   The primary encounter diagnosis was Syncope and collapse. Diagnoses of History of atrial fibrillation, Acute kidney injury, Leukocytosis, unspecified type, Elevated digoxin level, and Elevated troponin were also pertinent to this visit.    Code status:   Current Code Status       Date Active Code Status Order ID Comments User Context       Prior            Allergies:   Amlodipine besylate, Aspirin, Bactrim [sulfamethoxazole-trimethoprim], Erythromycin, Levaquin [levofloxacin], Nitrofurantoin, and Ramipril    Isolation:   No active isolations    Intake and Output  No intake or output data in the 24 hours ending 07/10/24 1626    Weight:       07/10/24  1305   Weight: 59 kg (130 lb)       Most recent vitals:   Vitals:    07/10/24 1416 07/10/24 1419 07/10/24 1518 07/10/24 1539   BP: 140/89  123/84    Patient Position:       Pulse: 75 77 80 79   Resp:       Temp:       TempSrc:       SpO2:  94% 93% 93%   Weight:       Height:           Active LDAs/IV Access:   Lines, Drains & Airways       Active LDAs       Name Placement date Placement time Site Days    Peripheral IV 07/10/24 1304 Left Antecubital 07/10/24  1304  Antecubital  less than 1                    Labs (abnormal labs have a star):   Labs Reviewed   COMPREHENSIVE METABOLIC PANEL - Abnormal; Notable for the following components:       Result Value    Glucose 179 (*)     Creatinine 1.30 (*)     Chloride 96 (*)     Anion Gap 15.4 (*)     eGFR 38.4 (*)     All other components within normal limits    Narrative:     GFR Normal >60  Chronic Kidney Disease <60  Kidney Failure <15    The GFR formula is only valid for adults with stable renal function between ages 18 and 70.    PROTIME-INR - Abnormal; Notable for the following components:    Protime 15.3 (*)     INR 1.19 (*)     All other components within normal limits   TROPONIN - Abnormal; Notable for the following components:    HS Troponin T 52 (*)     All other components within normal limits    Narrative:     High Sensitive Troponin T Reference Range:  <14.0 ng/L- Negative Female for AMI  <22.0 ng/L- Negative Male for AMI  >=14 - Abnormal Female indicating possible myocardial injury.  >=22 - Abnormal Male indicating possible myocardial injury.   Clinicians would have to utilize clinical acumen, EKG, Troponin, and serial changes to determine if it is an Acute Myocardial Infarction or myocardial injury due to an underlying chronic condition.        DIGOXIN LEVEL - Abnormal; Notable for the following components:    Digoxin 1.50 (*)     All other components within normal limits   MAGNESIUM - Abnormal; Notable for the following components:    Magnesium 2.4 (*)     All other components within normal limits   TSH - Abnormal; Notable for the following components:    TSH 4.790 (*)     All other components within normal limits   CBC WITH AUTO DIFFERENTIAL - Abnormal; Notable for the following components:    WBC 18.61 (*)     .2 (*)     MCHC 31.2 (*)     Neutrophil % 83.8 (*)     Lymphocyte % 6.4 (*)     Immature Grans % 2.8 (*)     Neutrophils, Absolute 15.58 (*)     Monocytes, Absolute 1.01 (*)     Immature Grans, Absolute 0.52 (*)     nRBC 1.5 (*)     All other components within normal limits   HIGH SENSITIVITIY TROPONIN T 2HR - Abnormal; Notable for the following components:    HS Troponin T 42 (*)     Troponin T Delta -10 (*)     All other components within normal limits    Narrative:     High Sensitive Troponin T Reference Range:  <14.0 ng/L- Negative Female for AMI  <22.0 ng/L- Negative Male for AMI  >=14 - Abnormal Female indicating possible myocardial injury.  >=22 - Abnormal Male indicating possible myocardial injury.  "  Clinicians would have to utilize clinical acumen, EKG, Troponin, and serial changes to determine if it is an Acute Myocardial Infarction or myocardial injury due to an underlying chronic condition.        LACTIC ACID, PLASMA - Abnormal; Notable for the following components:    Lactate 2.3 (*)     All other components within normal limits   URINALYSIS W/ MICROSCOPIC IF INDICATED (NO CULTURE) - Abnormal; Notable for the following components:    Protein, UA Trace (*)     All other components within normal limits    Narrative:     Urine microscopic not indicated.   T4, FREE - Normal   PROCALCITONIN - Normal    Narrative:     As a Marker for Sepsis (Non-Neonates):    1. <0.5 ng/mL represents a low risk of severe sepsis and/or septic shock.  2. >2 ng/mL represents a high risk of severe sepsis and/or septic shock.    As a Marker for Lower Respiratory Tract Infections that require antibiotic therapy:    PCT on Admission    Antibiotic Therapy       6-12 Hrs later    >0.5                Strongly Recommended  >0.25 - <0.5        Recommended   0.1 - 0.25          Discouraged              Remeasure/reassess PCT  <0.1                Strongly Discouraged     Remeasure/reassess PCT    As 28 day mortality risk marker: \"Change in Procalcitonin Result\" (>80% or <=80%) if Day 0 (or Day 1) and Day 4 values are available. Refer to [email or web address]    Change in PCT <=80%  A decrease of PCT levels below or equal to 80% defines a positive change in PCT test result representing a higher risk for 28-day all-cause mortality of patients diagnosed with severe sepsis for septic shock.    Change in PCT >80%  A decrease of PCT levels of more than 80% defines a negative change in PCT result representing a lower risk for 28-day all-cause mortality of patients diagnosed with severe sepsis or septic shock.      LACTIC ACID, REFLEX   CBC AND DIFFERENTIAL    Narrative:     The following orders were created for panel order CBC & " Differential.  Procedure                               Abnormality         Status                     ---------                               -----------         ------                     CBC Auto Differential[[id number]]        Abnormal            Final result                 Please view results for these tests on the individual orders.       EKG:   ECG 12 Lead Syncope   Final Result   HEART RATE=80  bpm   RR Utrdbusr=463  ms   AR Interval=  ms   P Horizontal Axis=  deg   P Front Axis=  deg   QRSD Interval=75  ms   QT Hebkzqle=140  ms   HUsT=385  ms   QRS Axis=6  deg   T Wave Axis=202  deg   - ABNORMAL ECG -   Atrial fibrillation   LVH with secondary repolarization abnormality   Rate has improved   Electronically Signed By: Anayeli Christopher ([facility]) 2024-07-10 15:58:45   Date and Time of Study:2024-07-10 12:39:35          Meds given in ED:   Medications   sodium chloride 0.9 % flush 10 mL (has no administration in time range)   sodium chloride 0.9 % bolus 500 mL (500 mL Intravenous New Bag 7/10/24 1349)       Imaging results:  XR Chest 1 View    Result Date: 7/10/2024  Stable linear atelectasis/scarring along the right minor fissure. No new focal consolidation. No pleural effusion or pneumothorax. Stable cardiomegaly. Nondilated distinct central pulmonary vasculature without radiographic findings to suggest redistribution/edema. No focal osseous abnormality.  This report was finalized on 7/10/2024 2:37 PM by Dr. Karel Castañeda M.D on Workstation: [id number]       Ambulatory status:   - assist     Social issues:   Social History     Socioeconomic History    Marital status:    Tobacco Use    Smoking status: Never    Smokeless tobacco: Never    Tobacco comments:     caffiene daily   Vaping Use    Vaping status: Never Used   Substance and Sexual Activity    Alcohol use: Not Currently     Alcohol/week: 2.0 standard drinks of alcohol     Types: 1 Glasses of wine, 1 Shots of liquor per week    Drug use: No     Sexual activity: Defer     Partners: Male       Peripheral Neurovascular  Peripheral Neurovascular (Adult)  Peripheral Neurovascular WDL: WDL    Neuro Cognitive  Neuro Cognitive (Adult)  Cognitive/Neuro/Behavioral WDL: .WDL except, orientation, speech  Level of Consciousness: Alert  Orientation: disoriented to, time  Speech: clear, logical  Pupils  Pupil PERRLA: yes    Learning  Learning Assessment (Adult)  Learning Readiness and Ability: cognitive limitation noted  Education Provided  Person Taught: patient, family member/friend  Teaching Method: verbal instruction  Teaching Focus: symptom/problem overview, diagnostic test, medication administration, medical device/equipment use  Education Outcome Evaluation: acceptance expressed, verbalizes understanding    Respiratory  Respiratory WDL  Respiratory WDL: WDL    Abdominal Pain       Pain Assessments  Pain (Adult)  (0-10) Pain Rating: Rest: 0    NIH Stroke Scale       Arabella Daniels RN  07/10/24 16:26 EDT

## 2024-07-11 ENCOUNTER — APPOINTMENT (OUTPATIENT)
Dept: MRI IMAGING | Facility: HOSPITAL | Age: 89
End: 2024-07-11
Payer: MEDICARE

## 2024-07-11 LAB
ANION GAP SERPL CALCULATED.3IONS-SCNC: 10.2 MMOL/L (ref 5–15)
BUN SERPL-MCNC: 22 MG/DL (ref 8–23)
BUN/CREAT SERPL: 20.8 (ref 7–25)
CALCIUM SPEC-SCNC: 9.2 MG/DL (ref 8.2–9.6)
CHLORIDE SERPL-SCNC: 104 MMOL/L (ref 98–107)
CO2 SERPL-SCNC: 26.8 MMOL/L (ref 22–29)
CREAT SERPL-MCNC: 1.06 MG/DL (ref 0.57–1)
DEPRECATED RDW RBC AUTO: 48.1 FL (ref 37–54)
EGFRCR SERPLBLD CKD-EPI 2021: 49.1 ML/MIN/1.73
ERYTHROCYTE [DISTWIDTH] IN BLOOD BY AUTOMATED COUNT: 13.6 % (ref 12.3–15.4)
GLUCOSE SERPL-MCNC: 109 MG/DL (ref 65–99)
HCT VFR BLD AUTO: 34.4 % (ref 34–46.6)
HGB BLD-MCNC: 11 G/DL (ref 12–15.9)
MCH RBC QN AUTO: 31.2 PG (ref 26.6–33)
MCHC RBC AUTO-ENTMCNC: 32 G/DL (ref 31.5–35.7)
MCV RBC AUTO: 97.5 FL (ref 79–97)
PLATELET # BLD AUTO: 328 10*3/MM3 (ref 140–450)
PMV BLD AUTO: 9.8 FL (ref 6–12)
POTASSIUM SERPL-SCNC: 3.2 MMOL/L (ref 3.5–5.2)
RBC # BLD AUTO: 3.53 10*6/MM3 (ref 3.77–5.28)
SODIUM SERPL-SCNC: 141 MMOL/L (ref 136–145)
WBC NRBC COR # BLD AUTO: 10.15 10*3/MM3 (ref 3.4–10.8)

## 2024-07-11 PROCEDURE — G0378 HOSPITAL OBSERVATION PER HR: HCPCS

## 2024-07-11 PROCEDURE — 99214 OFFICE O/P EST MOD 30 MIN: CPT | Performed by: NURSE PRACTITIONER

## 2024-07-11 PROCEDURE — 99213 OFFICE O/P EST LOW 20 MIN: CPT

## 2024-07-11 PROCEDURE — 70551 MRI BRAIN STEM W/O DYE: CPT

## 2024-07-11 PROCEDURE — 94760 N-INVAS EAR/PLS OXIMETRY 1: CPT

## 2024-07-11 PROCEDURE — 94664 DEMO&/EVAL PT USE INHALER: CPT

## 2024-07-11 PROCEDURE — 94761 N-INVAS EAR/PLS OXIMETRY MLT: CPT

## 2024-07-11 PROCEDURE — 94640 AIRWAY INHALATION TREATMENT: CPT

## 2024-07-11 PROCEDURE — 63710000001 PREDNISONE PER 5 MG: Performed by: INTERNAL MEDICINE

## 2024-07-11 PROCEDURE — 94799 UNLISTED PULMONARY SVC/PX: CPT

## 2024-07-11 PROCEDURE — 80048 BASIC METABOLIC PNL TOTAL CA: CPT | Performed by: INTERNAL MEDICINE

## 2024-07-11 PROCEDURE — 85027 COMPLETE CBC AUTOMATED: CPT | Performed by: INTERNAL MEDICINE

## 2024-07-11 RX ADMIN — CARVEDILOL 3.12 MG: 3.12 TABLET, FILM COATED ORAL at 18:18

## 2024-07-11 RX ADMIN — IPRATROPIUM BROMIDE AND ALBUTEROL SULFATE 3 ML: .5; 3 SOLUTION RESPIRATORY (INHALATION) at 20:50

## 2024-07-11 RX ADMIN — AZITHROMYCIN 250 MG: 250 TABLET, FILM COATED ORAL at 08:47

## 2024-07-11 RX ADMIN — BUDESONIDE AND FORMOTEROL FUMARATE DIHYDRATE 2 PUFF: 160; 4.5 AEROSOL RESPIRATORY (INHALATION) at 20:50

## 2024-07-11 RX ADMIN — PREDNISONE 10 MG: 10 TABLET ORAL at 08:47

## 2024-07-11 RX ADMIN — GUAIFENESIN 600 MG: 600 TABLET, EXTENDED RELEASE ORAL at 08:47

## 2024-07-11 RX ADMIN — Medication 1 CAPSULE: at 08:47

## 2024-07-11 RX ADMIN — IPRATROPIUM BROMIDE AND ALBUTEROL SULFATE 3 ML: .5; 3 SOLUTION RESPIRATORY (INHALATION) at 07:43

## 2024-07-11 RX ADMIN — BUDESONIDE AND FORMOTEROL FUMARATE DIHYDRATE 2 PUFF: 160; 4.5 AEROSOL RESPIRATORY (INHALATION) at 07:47

## 2024-07-11 RX ADMIN — CITALOPRAM 20 MG: 20 TABLET, FILM COATED ORAL at 11:07

## 2024-07-11 RX ADMIN — GUAIFENESIN 600 MG: 600 TABLET, EXTENDED RELEASE ORAL at 19:54

## 2024-07-11 RX ADMIN — CARVEDILOL 3.12 MG: 3.12 TABLET, FILM COATED ORAL at 08:47

## 2024-07-11 RX ADMIN — APIXABAN 2.5 MG: 2.5 TABLET, FILM COATED ORAL at 19:54

## 2024-07-11 RX ADMIN — APIXABAN 2.5 MG: 2.5 TABLET, FILM COATED ORAL at 08:47

## 2024-07-11 NOTE — PLAN OF CARE
Goal Outcome Evaluation:    Problem: Adult Inpatient Plan of Care  Goal: Absence of Hospital-Acquired Illness or Injury  Intervention: Identify and Manage Fall Risk  Recent Flowsheet Documentation  Taken 7/11/2024 1600 by Aliya Malloy RN  Safety Promotion/Fall Prevention: safety round/check completed  Taken 7/11/2024 1400 by Aliya Malloy RN  Safety Promotion/Fall Prevention: safety round/check completed  Taken 7/11/2024 1249 by Aliya Malloy RN  Safety Promotion/Fall Prevention:   toileting scheduled   safety round/check completed   lighting adjusted   fall prevention program maintained   clutter free environment maintained   activity supervised  Taken 7/11/2024 1000 by Aliya Malloy RN  Safety Promotion/Fall Prevention: safety round/check completed  Taken 7/11/2024 0830 by Aliya Malloy RN  Safety Promotion/Fall Prevention: safety round/check completed   Plan of Care Reviewed With: patient alert, room air, had an MRI, waiting for EEG, no c/o on this shift, falls precautions maintained and call light within reach.

## 2024-07-11 NOTE — PLAN OF CARE
Patient was alert and oriented x4, calm, cooperative and pleasant. VS were wnl, on room air. Patient did not have episode of dizziness or lightheadedness when she stood up and used the bedside commode. No episode of SOB reported at rest as well. Patient was worried that she may feel dizzy and lightheaded again if she walks.     Goal Outcome Evaluation:  Plan of Care Reviewed With: patient        Progress: no change

## 2024-07-11 NOTE — H&P
HISTORY AND PHYSICAL   [facility]        Date of Admission: 7/10/2024  Patient Identification:  Name: Agnieszka Rizzo  Age: 89 y.o.  Sex: female  :  1930  MRN: [id number]                     Primary Care Physician: Matt Rose MD    Chief Complaint:  89 year old female presented to the emergency room after a syncopal episode; she was taking a shower and felt weak so her daughter sat her on the toilet; she passed out and lost control of her bowels; after she regained consciousness the daughter placed her back in the shower and she had another syncopal episode; no jerking was observed; the patient was not confused after the episodes; she does not remember what happened; her daughter is not present at this time; she has a history of cad, chf and a fib and is followed by dr zaragoza; no chest pain or shortness of breath     History of Present Illness:   As above    Past Medical History:  Past Medical History:   Diagnosis Date    Aspergillus     Asthma     Atrial fibrillation     chronic    Atrial flutter     Bronchiectasis     C. difficile diarrhea 2017    CAD (coronary artery disease)     nonobstructive    Chronic diastolic CHF (congestive heart failure)     Colitis     Cough     Cryoglobulinemia     Dyspnea on exertion     Fall     Hyperlipidemia     Hypertension     Hyponatremia     Hypoxia     Infectious viral hepatitis     AGE 13    Left shoulder pain     Leg swelling     Lesion of lung     Malignant hyperthermia due to anesthesia     Mild tricuspid regurgitation     MR (mitral regurgitation)     mild    MVP (mitral valve prolapse)     Permanent atrial fibrillation     Pneumonia with the fungal infection aspergillosis 2017    Pneumothorax     SOB (shortness of breath)     UTI (urinary tract infection)     Wheeze     mild     Past Surgical History:  Past Surgical History:   Procedure Laterality Date    BRONCHOSCOPY N/A 2016    Procedure: BRONCHOSCOPY WITH FLUORO, BRUSHINGS,  BAL, AND BIOPSIES;  Surgeon: Rogelio Tucker MD;  Location: [facility] ENDOSCOPY;  Service:     BRONCHOSCOPY Bilateral 06/03/2017    Procedure: BRONCHOSCOPY with BAL ;  Surgeon: Sung King MD;  Location: [facility] ENDOSCOPY;  Service:     BRONCHOSCOPY N/A 12/17/2019    Procedure: BRONCHOSCOPY WITH WASHINGS;  Surgeon: Rogelio Tucker MD;  Location: [facility] ENDOSCOPY;  Service: Pulmonary    BRONCHOSCOPY N/A 07/15/2022    Procedure: BRONCHOSCOPY;  Surgeon: Rogelio Tucker MD;  Location: [facility] ENDOSCOPY;  Service: Pulmonary;  Laterality: N/A;  Pre: Pneumonia  Post: Pneumonia    BRONCHOSCOPY N/A 10/2/2023    Procedure: BRONCHOSCOPY WITH BRONCHIAL AVEOLAR LAVAGE;  Surgeon: Rogelio Tucker MD;  Location: [facility] ENDOSCOPY;  Service: Pulmonary;  Laterality: N/A;  PRE:BRONCHIECTASIS /   POST: SAME    CATARACT EXTRACTION EXTRACAPSULAR W/ INTRAOCULAR LENS IMPLANTATION      COLONOSCOPY      2013    D & C WITH SUCTION      HYSTERECTOMY      KNEE ARTHROSCOPY Left     Partial      Home Meds:  Medications Prior to Admission   Medication Sig Dispense Refill Last Dose    acetaminophen (TYLENOL) 325 MG tablet Take 2 tablets by mouth Every 4 (Four) Hours As Needed for Moderate Pain. Indications: Pain   Past Week    albuterol sulfate  (90 Base) MCG/ACT inhaler Inhale 2 puffs Every 6 (Six) Hours As Needed for Wheezing or Shortness of Air. 8 g 11 7/9/2024    apixaban (ELIQUIS) 2.5 MG tablet tablet Take 1 tablet by mouth Every 12 (Twelve) Hours. Indications: Other - full anticoagulation 180 tablet 3 7/10/2024 at 0900    azithromycin (ZITHROMAX) 250 MG tablet Take 1 tablet by mouth Daily.   7/10/2024 at 0900    benzonatate (TESSALON) 200 MG capsule Take 1 capsule by mouth 3 (Three) Times a Day As Needed for Cough. Indications: Cough       bumetanide (BUMEX) 2 MG tablet Take 1 tablet by mouth 2 (Two) Times a Day. Indications: Cardiac Failure, Edema 180 tablet 0 7/10/2024 at 0900    carvedilol (COREG) 3.125 MG tablet Take 1 tablet by mouth 2 (Two) Times  a Day With Meals. Indications: Cardiac Failure, High Blood Pressure Disorder 180 tablet 3 7/10/2024 at 0900    citalopram (CeleXA) 20 MG tablet TAKE 1 TABLET BY MOUTH DAILY FOR MAJOR DEPRESSION 30 tablet 1 7/9/2024    digoxin (LANOXIN) 125 MCG tablet Take 1 tablet by mouth Daily. Indications: Atrial Fibrillation, Cardiac Failure 90 tablet 3 7/10/2024 at 0900    fexofenadine (ALLEGRA) 180 MG tablet Take 1 tablet by mouth Daily. Indications: Hayfever   7/9/2024    fluticasone-salmeterol (ADVAIR HFA) 115-21 MCG/ACT inhaler Inhale 2 puffs 2 (Two) Times a Day. Indications: Asthma   7/9/2024    guaiFENesin (MUCINEX) 600 MG 12 hr tablet Take 1 tablet by mouth 2 (Two) Times a Day. Indications: Cough   7/10/2024 at 0900    ipratropium-albuterol (DUO-NEB) 0.5-2.5 mg/3 ml nebulizer Take 3 mL by nebulization 2 (Two) Times a Day. Indications: Asthma   7/9/2024    Lactobacillus (FLORAJEN ACIDOPHILUS) capsule Take 1 tablet by mouth Daily. Indications: Heart failure, status change, with med/diet change   7/9/2024    O2 (OXYGEN) Inhale 2 L/min Every Night. Indications: Oxygen Therapy       potassium chloride (K-DUR,KLOR-CON) 20 MEQ CR tablet Take 1 tablet by mouth Daily. 90 tablet 3 7/10/2024 at 0900    predniSONE (DELTASONE) 10 MG tablet Take 1 tablet by mouth Daily.   7/10/2024 at 0900    sodium chloride 7 % nebulizer solution nebulizer solution Take 4 mL by nebulization 2 (Two) Times a Day. 240 mL 0 7/9/2024    cholecalciferol (VITAMIN D3) 25 MCG (1000 UT) tablet Take 1 tablet by mouth Daily. Indications: Vitamin D Deficiency       FeroSul 325 (65 Fe) MG tablet TAKE ONE TABLET BY MOUTH DAILY WITH BREAKFAST (Patient taking differently: Take 1 tablet by mouth Daily.) 90 tablet 0     Multiple Vitamins-Minerals (PRESERVISION AREDS PO) Take 1 tablet by mouth 2 (Two) Times a Day. Indications: Vitamin and/or Mineral Deficiency       promethazine (PHENERGAN) 6.25 MG/5ML solution oral solution Take 5 mL by mouth As Needed (cough).  Indications: Nausea and Vomiting          Allergies:  Allergies   Allergen Reactions    Amlodipine Besylate Swelling    Aspirin GI Intolerance     Caused bleeding ulcers    Bactrim [Sulfamethoxazole-Trimethoprim] Nausea And Vomiting    Erythromycin Unknown (See Comments)     Patient does not recall type of reaction.    Levaquin [Levofloxacin] Unknown (See Comments)     Nausea      Nitrofurantoin Nausea Only and Nausea And Vomiting    Ramipril Other (See Comments)     Cough     Immunizations:  Immunization History   Administered Date(s) Administered    COVID-19 (PFIZER) Purple Cap Monovalent 01/30/2021, 02/20/2021, 09/09/2021    Flu Vaccine Intradermal Quad 18-64YR 10/01/2016    Fluad Quad 65+ 09/16/2020    Fluzone High Dose =>65 Years ([employer] ONLY) 10/01/2016, 09/13/2017, 10/01/2018    Pneumococcal Conjugate 13-Valent (PCV13) 10/01/2016    Pneumococcal Polysaccharide (PPSV23) 01/01/2000    Shingrix 03/12/2018, 08/01/2018    Tdap 07/12/2021     Social History:   Social History     Social History Narrative    Not on file     Social History     Socioeconomic History    Marital status:    Tobacco Use    Smoking status: Never    Smokeless tobacco: Never    Tobacco comments:     caffiene daily   Vaping Use    Vaping status: Never Used   Substance and Sexual Activity    Alcohol use: Not Currently     Alcohol/week: 2.0 standard drinks of alcohol     Types: 1 Glasses of wine, 1 Shots of liquor per week    Drug use: No    Sexual activity: Defer     Partners: Male       Family History:  Family History   Problem Relation Age of Onset    Hypertension Mother     Stroke Mother     Heart disease Father     Hypertension Father     Cancer Brother     Cancer Son         Review of Systems  See history of present illness and past medical history.  Patient denies headache, dizziness,   trauma, change in vision, change in hearing, change in taste, changes in weight, changes in appetite, focal weakness, numbness, or paresthesia.   "Patient denies chest pain, palpitations, dyspnea, orthopnea, PND, cough, sinus pressure, rhinorrhea, epistaxis, hemoptysis, nausea, vomiting,hematemesis, diarrhea, constipation or hematochezia.  Denies cold or heat intolerance, polydipsia, polyuria, polyphagia. Denies hematuria, pyuria, dysuria, hesitancy, frequency or urgency. Denies consumption of raw and under cooked meats foods or change in water source.  Denies fever, chills, sweats, night sweats.  Denies missing any routine medications.     Objective:  T Max 24 hrs: Temp (24hrs), Av °F (36.7 °C), Min:97.7 °F (36.5 °C), Max:98.2 °F (36.8 °C)    Vitals Ranges:   Temp:  [97.7 °F (36.5 °C)-98.2 °F (36.8 °C)] 97.7 °F (36.5 °C)  Heart Rate:  [71-92] 92  Resp:  [16-18] 18  BP: ()/(58-89) 141/77      Exam:  /77 (BP Location: Right arm, Patient Position: Sitting)   Pulse 92   Temp 97.7 °F (36.5 °C) (Oral)   Resp 18   Ht 162.6 cm (64\")   Wt 59 kg (130 lb)   SpO2 95%   BMI 22.31 kg/m²     General Appearance:    Alert, cooperative, no distress, appears stated age   Head:    Normocephalic, without obvious abnormality, atraumatic   Eyes:    PERRL, conjunctivae/corneas clear, EOM's intact, both eyes   Ears:    Normal external ear canals, both ears   Nose:   Nares normal, septum midline, mucosa normal, no drainage    or sinus tenderness   Throat:   Lips, mucosa, and tongue normal   Neck:   Supple, symmetrical, trachea midline, no adenopathy;     thyroid:  no enlargement/tenderness/nodules; no carotid    bruit or JVD   Back:     Symmetric, no curvature, ROM normal, no CVA tenderness   Lungs:     Decreased breath sounds bilaterally, respirations unlabored   Chest Wall:    No tenderness or deformity    Heart:    Regular rate and rhythm, S1 and S2 normal, no murmur, rub   or gallop   Abdomen:     Soft, nontender, bowel sounds active all four quadrants,     no masses, no hepatomegaly, no splenomegaly   Extremities:   Extremities normal, atraumatic, no " cyanosis or edema                       .    Data Review:  Labs in chart were reviewed.  WBC   Date Value Ref Range Status   07/10/2024 18.61 (H) 3.40 - 10.80 10*3/mm3 Final     Hemoglobin   Date Value Ref Range Status   07/10/2024 12.7 12.0 - 15.9 g/dL Final     Hematocrit   Date Value Ref Range Status   07/10/2024 40.7 34.0 - 46.6 % Final     Platelets   Date Value Ref Range Status   07/10/2024 257 140 - 450 10*3/mm3 Final     Sodium   Date Value Ref Range Status   07/10/2024 139 136 - 145 mmol/L Final     Potassium   Date Value Ref Range Status   07/10/2024 3.9 3.5 - 5.2 mmol/L Final     Comment:     Slight hemolysis detected by analyzer. Result may be falsely elevated.     Chloride   Date Value Ref Range Status   07/10/2024 96 (L) 98 - 107 mmol/L Final     CO2   Date Value Ref Range Status   07/10/2024 27.6 22.0 - 29.0 mmol/L Final     BUN   Date Value Ref Range Status   07/10/2024 16 8 - 23 mg/dL Final     Creatinine   Date Value Ref Range Status   07/10/2024 1.30 (H) 0.57 - 1.00 mg/dL Final     Glucose   Date Value Ref Range Status   07/10/2024 179 (H) 65 - 99 mg/dL Final     Calcium   Date Value Ref Range Status   07/10/2024 9.5 8.2 - 9.6 mg/dL Final     Magnesium   Date Value Ref Range Status   07/10/2024 2.4 (H) 1.7 - 2.3 mg/dL Final       Results from last 7 days   Lab Units 07/10/24  1304   TSH uIU/mL 4.790*   FREE T4 ng/dL 1.26          Imaging Results (All)       Procedure Component Value Units Date/Time    CT Head Without Contrast [[id number]] Collected: 07/10/24 1445     Updated: 07/10/24 1453    Narrative:      CT HEAD WITHOUT CONTRAST     HISTORY: Syncope.     COMPARISON: CT head 7/12/2021.     FINDINGS: There is mild diffuse atrophy, age-appropriate. Moderate  vascular calcification and mild to moderate small vessel ischemic  disease is noted. There is no evidence of hemorrhage, hydrocephalus,  abnormal extra-axial fluid or of acute infarction. Further evaluation  could be performed with a MRI  examination of the brain as indicated.           Radiation dose reduction techniques were utilized, including automated  exposure control and exposure modulation based on body size.        This report was finalized on 7/10/2024 2:49 PM by Dr. Lon Velasquez M.D  on Workstation: [id number]       XR Chest 1 View [[id number]] Collected: 07/10/24 1435     Updated: 07/10/24 1441    Narrative:      XR CHEST 1 VW-     INDICATION: Shortness of breath     COMPARISON: CT chest 12/28/2023 and radiographs dating back to 1/22/2016       Impression:      Stable linear atelectasis/scarring along the right minor  fissure. No new focal consolidation. No pleural effusion or  pneumothorax. Stable cardiomegaly. Nondilated distinct central pulmonary  vasculature without radiographic findings to suggest  redistribution/edema. No focal osseous abnormality.     This report was finalized on 7/10/2024 2:37 PM by Dr. Karel Castañeda M.D on Workstation: [id number]                 Assessment:  Active Hospital Problems    Diagnosis  POA    **Syncope and collapse [R55]  Yes      Resolved Hospital Problems   No resolved problems to display.   A fib  Cad  Chronic diastolic chf  Hyperglycemia  Copd  Hypertension  hyperlipidemia    Plan:  Monitor on telemetry  Ask cardiology to see her  Troponin is trending down  Hold bumex and digoxin  Dw patient and ed provider  Carlene Mooney MD  7/10/2024  20:47 EDT

## 2024-07-11 NOTE — NURSING NOTE
Cwon consult received.  Attempted to see patient but she is off the unit in MRI.  Spoke to nurse and skin tear to LE is currently dressed appropriately with Vaseline gauze and Kerlix.  Will have to see patient tomorrow.  RN agreeable.

## 2024-07-11 NOTE — PROGRESS NOTES
Name: Agnieszka Rizzo ADMIT: 7/10/2024   : 1930  PCP: Matt Rose MD    MRN: [id number] LOS: 0 days   AGE/SEX: 89 y.o. female  ROOM: [facility]     Subjective   Subjective   Resting in bed, no acute issues. Does not feel dizzy. Son at bedside.    Objective   Objective   Vital Signs  Temp:  [97.6 °F (36.4 °C)-98.2 °F (36.8 °C)] 98.2 °F (36.8 °C)  Heart Rate:  [60-80] 72  Resp:  [16-20] 20  BP: (107-135)/(57-67) 110/65  SpO2:  [94 %-99 %] 94 %  on   ;   Device (Oxygen Therapy): room air  Body mass index is 21.72 kg/m².  Physical Exam  Constitutional:       General: She is not in acute distress.     Appearance: Normal appearance. She is not toxic-appearing.   Cardiovascular:      Rate and Rhythm: Normal rate. Rhythm irregular.   Pulmonary:      Effort: Pulmonary effort is normal.   Abdominal:      General: Abdomen is flat. Bowel sounds are normal.      Palpations: Abdomen is soft.   Skin:     General: Skin is warm.   Neurological:      General: No focal deficit present.      Mental Status: She is alert and oriented to person, place, and time.   Psychiatric:         Mood and Affect: Mood normal.         Behavior: Behavior normal.         Results Review     I reviewed the patient's new clinical results.  Results from last 7 days   Lab Units 24  0547 07/10/24  1304   WBC 10*3/mm3 10.15 18.61*   HEMOGLOBIN g/dL 11.0* 12.7   PLATELETS 10*3/mm3 328 257     Results from last 7 days   Lab Units 24  0547 07/10/24  1304   SODIUM mmol/L 141 139   POTASSIUM mmol/L 3.2* 3.9   CHLORIDE mmol/L 104 96*   CO2 mmol/L 26.8 27.6   BUN mg/dL 22 16   CREATININE mg/dL 1.06* 1.30*   GLUCOSE mg/dL 109* 179*   EGFR mL/min/1.73 49.1* 38.4*     Results from last 7 days   Lab Units 07/10/24  1304   ALBUMIN g/dL 4.0   BILIRUBIN mg/dL 0.8   ALK PHOS U/L 109   AST (SGOT) U/L 25   ALT (SGPT) U/L 25     Results from last 7 days   Lab Units 24  0547 07/10/24  1304   CALCIUM mg/dL 9.2 9.5   ALBUMIN g/dL  --  4.0   MAGNESIUM  "mg/dL  --  2.4*     Results from last 7 days   Lab Units 07/10/24  1813 07/10/24  1447 07/10/24  1304   PROCALCITONIN ng/mL  --   --  0.09   LACTATE mmol/L 1.6 2.3*  --      No results found for: \"HGBA1C\", \"POCGLU\"    MRI Brain Without Contrast    Result Date: 7/11/2024  Mild to moderate small vessel ischemic disease is noted. There is no evidence of an acute infarct.  This report was finalized on 7/11/2024 4:48 PM by Dr. Lon Velasquez M.D on Workstation: [id number]      XR Chest 1 View    Result Date: 7/10/2024  Stable linear atelectasis/scarring along the right minor fissure. No new focal consolidation. No pleural effusion or pneumothorax. Stable cardiomegaly. Nondilated distinct central pulmonary vasculature without radiographic findings to suggest redistribution/edema. No focal osseous abnormality.  This report was finalized on 7/10/2024 2:37 PM by Dr. Karel Castañeda M.D on Workstation: [id number]     Scheduled Medications  apixaban, 2.5 mg, Oral, Q12H  azithromycin, 250 mg, Oral, Daily  budesonide-formoterol, 2 puff, Inhalation, BID - RT  carvedilol, 3.125 mg, Oral, BID With Meals  citalopram, 20 mg, Oral, Daily  guaiFENesin, 600 mg, Oral, BID  ipratropium-albuterol, 3 mL, Nebulization, BID  lactobacillus acidophilus, 1 capsule, Oral, Daily  predniSONE, 10 mg, Oral, Daily    Infusions   Diet  Diet: Cardiac; Healthy Heart (2-3 Na+); Fluid Consistency: Thin (IDDSI 0)       Assessment/Plan     Active Hospital Problems    Diagnosis  POA    **Syncope and collapse [R55]  Yes    Chronic diastolic CHF (congestive heart failure) [I50.32]  Yes    DNR (do not resuscitate) [Z66]  Yes    Benign essential hypertension [I10]  Yes    Chronic coronary artery disease [I25.10]  Yes      Resolved Hospital Problems   No resolved problems to display.       89 y.o. female admitted with Syncope and collapse.      07/11/24  Given loss of bowel control will ask Neurology to eval for seizure.    Syncope  -does not sound cardiac " based on her description. Could be component of orthostasis as well as vasovagal though bowel incontinence would be rare in either instance  -Cardiology, Neuro following  -EEG, MRI pending    Permanent Afib  -RC: Coreg  -AC: apixaban 2.5mg    COPD  -home inhalers    Hypokalemia, replete         DVT prophylaxis: Eliquis (home med)  Discussed with patient and care team on multidisciplinary rounds.  Anticipated discharge home, when cleared by consultants            Lev Melgar MD  [facility]  07/11/24  18:56 EDT

## 2024-07-11 NOTE — CONSULTS
Neurology Consult Note    Consult Date: 7/11/2024    Referring MD: Carlene Mooney, *    Reason for Consult I have been asked to see the patient in neurological consultation to render advice and opinion regarding LOC and bowel incontinence.    Agnieszka Rizzo is a 89 y.o. female with a PMHf of CHF, COPD, asthma, permanent atrial fibrillation on eliquis 2.5 mg BID, and CAD presents to the ED with syncope and collapse with bowel incontinence. Patient was in the shower when she became very weak so daughter sat her on the toilet. Patient states at the time she became very lightheaded, flushed, diaphoretic, nauseous and had some palpitations then put her head in her hands and that was the last thing she remembers. Daughter states she had a bowel movement at the time but denies any convulsion. Patient daughter state she went completely limp when she passed out both times.The daughter says she then put her in the shower and the patient had a second episode Patient daughter does say that the patient was confused about 20 minutes after episode as well. She denies any focal deficits. She denies tongue bite, headache, chest pain/pressure or SOA. Patient has never had seizure in the past and denies history of stroke or head trauma. She denies recent illness. She states that she typically drinks plenty of water a day but is unsure how much just some whenever she passes through the kitchen or takes her pills. She also drinks 1 cup of coffee in the morning and a glass of wine nightly. No family currently at bedside to verify.     Past Medical/Surgical Hx:  Past Medical History:   Diagnosis Date    Aspergillus     Asthma     Atrial fibrillation     chronic    Atrial flutter     Bronchiectasis     C. difficile diarrhea 03/11/2017    CAD (coronary artery disease)     nonobstructive    Chronic diastolic CHF (congestive heart failure)     Colitis     Cough     Cryoglobulinemia     Dyspnea on exertion     Fall     Hyperlipidemia      Hypertension     Hyponatremia     Hypoxia     Infectious viral hepatitis     AGE 13    Left shoulder pain     Leg swelling     Lesion of lung     Malignant hyperthermia due to anesthesia     Mild tricuspid regurgitation     MR (mitral regurgitation)     mild    MVP (mitral valve prolapse)     Permanent atrial fibrillation     Pneumonia with the fungal infection aspergillosis 01/06/2017    Pneumothorax     SOB (shortness of breath)     UTI (urinary tract infection)     Wheeze     mild     Past Surgical History:   Procedure Laterality Date    BRONCHOSCOPY N/A 11/12/2016    Procedure: BRONCHOSCOPY WITH FLUORO, BRUSHINGS, BAL, AND BIOPSIES;  Surgeon: Rogelio Tucker MD;  Location: [facility] ENDOSCOPY;  Service:     BRONCHOSCOPY Bilateral 06/03/2017    Procedure: BRONCHOSCOPY with BAL ;  Surgeon: Sung King MD;  Location: [facility] ENDOSCOPY;  Service:     BRONCHOSCOPY N/A 12/17/2019    Procedure: BRONCHOSCOPY WITH WASHINGS;  Surgeon: Rogelio Tucker MD;  Location: [facility] ENDOSCOPY;  Service: Pulmonary    BRONCHOSCOPY N/A 07/15/2022    Procedure: BRONCHOSCOPY;  Surgeon: Rogelio Tucker MD;  Location: [facility] ENDOSCOPY;  Service: Pulmonary;  Laterality: N/A;  Pre: Pneumonia  Post: Pneumonia    BRONCHOSCOPY N/A 10/2/2023    Procedure: BRONCHOSCOPY WITH BRONCHIAL AVEOLAR LAVAGE;  Surgeon: Rogelio Tucker MD;  Location: [facility] ENDOSCOPY;  Service: Pulmonary;  Laterality: N/A;  PRE:BRONCHIECTASIS /   POST: SAME    CATARACT EXTRACTION EXTRACAPSULAR W/ INTRAOCULAR LENS IMPLANTATION      COLONOSCOPY      2013    D & C WITH SUCTION      HYSTERECTOMY      KNEE ARTHROSCOPY Left     Partial       Medications On Admission  Medications Prior to Admission   Medication Sig Dispense Refill Last Dose    acetaminophen (TYLENOL) 325 MG tablet Take 2 tablets by mouth Every 4 (Four) Hours As Needed for Moderate Pain. Indications: Pain   Past Week    albuterol sulfate  (90 Base) MCG/ACT inhaler Inhale 2 puffs Every 6 (Six) Hours As Needed for  Wheezing or Shortness of Air. 8 g 11 7/9/2024    apixaban (ELIQUIS) 2.5 MG tablet tablet Take 1 tablet by mouth Every 12 (Twelve) Hours. Indications: Other - full anticoagulation 180 tablet 3 7/10/2024 at 0900    azithromycin (ZITHROMAX) 250 MG tablet Take 1 tablet by mouth Daily.   7/10/2024 at 0900    benzonatate (TESSALON) 200 MG capsule Take 1 capsule by mouth 3 (Three) Times a Day As Needed for Cough. Indications: Cough       bumetanide (BUMEX) 2 MG tablet Take 1 tablet by mouth 2 (Two) Times a Day. Indications: Cardiac Failure, Edema 180 tablet 0 7/10/2024 at 0900    carvedilol (COREG) 3.125 MG tablet Take 1 tablet by mouth 2 (Two) Times a Day With Meals. Indications: Cardiac Failure, High Blood Pressure Disorder 180 tablet 3 7/10/2024 at 0900    citalopram (CeleXA) 20 MG tablet TAKE 1 TABLET BY MOUTH DAILY FOR MAJOR DEPRESSION 30 tablet 1 7/9/2024    digoxin (LANOXIN) 125 MCG tablet Take 1 tablet by mouth Daily. Indications: Atrial Fibrillation, Cardiac Failure 90 tablet 3 7/10/2024 at 0900    fexofenadine (ALLEGRA) 180 MG tablet Take 1 tablet by mouth Daily. Indications: Hayfever   7/9/2024    fluticasone-salmeterol (ADVAIR HFA) 115-21 MCG/ACT inhaler Inhale 2 puffs 2 (Two) Times a Day. Indications: Asthma   7/9/2024    guaiFENesin (MUCINEX) 600 MG 12 hr tablet Take 1 tablet by mouth 2 (Two) Times a Day. Indications: Cough   7/10/2024 at 0900    ipratropium-albuterol (DUO-NEB) 0.5-2.5 mg/3 ml nebulizer Take 3 mL by nebulization 2 (Two) Times a Day. Indications: Asthma   7/9/2024    Lactobacillus (FLORAJEN ACIDOPHILUS) capsule Take 1 tablet by mouth Daily. Indications: Heart failure, status change, with med/diet change   7/9/2024    O2 (OXYGEN) Inhale 2 L/min Every Night. Indications: Oxygen Therapy       potassium chloride (K-DUR,KLOR-CON) 20 MEQ CR tablet Take 1 tablet by mouth Daily. 90 tablet 3 7/10/2024 at 0900    predniSONE (DELTASONE) 10 MG tablet Take 1 tablet by mouth Daily.   7/10/2024 at 0900     "sodium chloride 7 % nebulizer solution nebulizer solution Take 4 mL by nebulization 2 (Two) Times a Day. 240 mL 0 7/9/2024    cholecalciferol (VITAMIN D3) 25 MCG (1000 UT) tablet Take 1 tablet by mouth Daily. Indications: Vitamin D Deficiency       FeroSul 325 (65 Fe) MG tablet TAKE ONE TABLET BY MOUTH DAILY WITH BREAKFAST (Patient taking differently: Take 1 tablet by mouth Daily.) 90 tablet 0     Multiple Vitamins-Minerals (PRESERVISION AREDS PO) Take 1 tablet by mouth 2 (Two) Times a Day. Indications: Vitamin and/or Mineral Deficiency       promethazine (PHENERGAN) 6.25 MG/5ML solution oral solution Take 5 mL by mouth As Needed (cough). Indications: Nausea and Vomiting          Allergies:  Allergies   Allergen Reactions    Amlodipine Besylate Swelling    Aspirin GI Intolerance     Caused bleeding ulcers    Bactrim [Sulfamethoxazole-Trimethoprim] Nausea And Vomiting    Erythromycin Unknown (See Comments)     Patient does not recall type of reaction.    Levaquin [Levofloxacin] Unknown (See Comments)     Nausea      Nitrofurantoin Nausea Only and Nausea And Vomiting    Ramipril Other (See Comments)     Cough       Social Hx:  Social History     Socioeconomic History    Marital status:    Tobacco Use    Smoking status: Never    Smokeless tobacco: Never    Tobacco comments:     caffiene daily   Vaping Use    Vaping status: Never Used   Substance and Sexual Activity    Alcohol use: Not Currently     Alcohol/week: 2.0 standard drinks of alcohol     Types: 1 Glasses of wine, 1 Shots of liquor per week    Drug use: No    Sexual activity: Defer     Partners: Male       Family Hx:  Family History   Problem Relation Age of Onset    Hypertension Mother     Stroke Mother     Heart disease Father     Hypertension Father     Cancer Brother     Cancer Son        Exam    /65 (BP Location: Left arm, Patient Position: Sitting)   Pulse 73   Temp 98.1 °F (36.7 °C) (Oral)   Resp 20   Ht 162.6 cm (64\")   Wt 57.4 kg (126 " lb 8.7 oz)   SpO2 94%   BMI 21.72 kg/m²   gen: NAD, vitals reviewed  Eyes: fundus sharp with no papilledema or retinal hemorrhages  HEENT: no nuchal rigidity  CVS: RRR, S1, S2  MS: oriented x3, recent/remote memory intact, normal attention/concentration, language intact, no neglect, normal fund of knowledge  CN: visual acuity grossly normal, visual fields full, PERRL, EOMI, facial sensation equal, no facial droop, hearing symmetric, palate elevates symmetrically, shoulder shrug equal, tongue midline  Motor: 5/5 throughout upper and lower extremities, normal tone  Sensation: intact to vibration and temperature throughout  Reflexes: 2+ throughout upper and lower extremities, downgoing plantars  Coordination: no dysmetria with finger to nose bilaterally  Gait: no ataxia, normal station    DATA:    Lab Results   Component Value Date    GLUCOSE 109 (H) 07/11/2024    CALCIUM 9.2 07/11/2024     07/11/2024    K 3.2 (L) 07/11/2024    CO2 26.8 07/11/2024     07/11/2024    BUN 22 07/11/2024    CREATININE 1.06 (H) 07/11/2024    EGFRIFAFRI 87 11/14/2019    EGFRIFNONA 67 08/27/2021    BCR 20.8 07/11/2024    ANIONGAP 10.2 07/11/2024     Lab Results   Component Value Date    WBC 10.15 07/11/2024    HGB 11.0 (L) 07/11/2024    HCT 34.4 07/11/2024    MCV 97.5 (H) 07/11/2024     07/11/2024     Lab Results   Component Value Date    LDL 85 08/27/2021     (H) 12/29/2020    LDL 86 12/17/2019     Lab Results   Component Value Date    HGBA1C 6.10 (H) 11/28/2023     Lab Results   Component Value Date    INR 1.19 (H) 07/10/2024    INR 1.35 (H) 07/09/2022    INR 2.50 08/13/2021    PROTIME 15.3 (H) 07/10/2024    PROTIME 16.4 (H) 07/09/2022    PROTIME 44.7 (H) 07/12/2021       Lab review:   Glucose 109  K 3.2  Creatinine 1.30 yesterday and improved to 1.06 today  eGFR 38.4 yesterday and improved to 49.1 today  HgB 11  TSH 4.790  Free t4 1.26  WBC: 18.61   Lactate 2.3     Imaging review:   CTH: FINDINGS: There is mild  diffuse atrophy, age-appropriate. Moderate  vascular calcification and mild to moderate small vessel ischemic  disease is noted. There is no evidence of hemorrhage, hydrocephalus,  abnormal extra-axial fluid or of acute infarction. Further evaluation  could be performed with a MRI examination of the brain as indicated.    EKG: atrial fibrillation     Diagnoses:  LOC  Elevated Creatinine   Digoxin toxicity, stopped by cardiology  History of permanent atrial fibrillation on eliquis 2.5 mg BID  CAD  CHF  COPD    Comment: Patient episode LOC sounds like potential syncope which may have provoked an atonic seizure as patient went completely limp when she passed out vs primary atonic seizure. Patient daughter does say that the patient was confused about 20 minutes after episode as well. She had transient leukocytosis and elevated lactate concerning for possible seizure. Therefore, will obtain EEG and MRI brain without contrast to further work up. Orthostatic vital signs ordered and bumex held by cardiology. Will likely start patient on Keppra regardless of EEG findings due to major concern for seizure but will wait to start until after EEG results. Discussed drinking at least 6-8 cups a water per day with patient and drinking caffeine and alcohol in moderation.     PLAN:   Orthostatic vital signs  Drink at least 2L of water daily and limit caffeine and alcohol consumption.  Bumex held by cardiology  EEG   MRI Brain without contrast      Further recommendations to come from Dr. Bliss.

## 2024-07-11 NOTE — CONSULTS
Patient Name: Agnieszka Rizzo  :1930  89 y.o.    Date of Admission: 7/10/2024  Date of Consultation:  24  Encounter Provider: PJ Day  Place of Service: [facility] CARDIOLOGY  Referring Provider: Carlene Mooney MD  Patient Care Team:  Matt Rose MD as PCP - General (Family Medicine)  Marcie Robbins MD as Consulting Physician (Cardiology)  Nilesh Danielle MD as Consulting Physician (Urology)  Lina Morris RPH as Pharmacist  Lev Mckeon PharmD as Pharmacist (Pharmacy)  Rogelio Tucker MD as Consulting Physician (Pulmonary Disease)  Giulia Saunders RN as Ambulatory  ([facility])      Chief complaint: syncope    History of Present Illness:  Ms. Rizzo is a 89 year old woman followed by Dr. Robbins for atrial fibrillation , coronary artery disease and chronic diastolic heart failure requiring recurrent hospitalizations. She was last hospitalizated in November just after . She was also treated for COPD exacerbation. She was diuresed and discharged about a week later. She followed up in the office shortly after discharge. Her proBNp was elevated. She had abdominal bloating and weight gain. Lasix was changed to bumetanide as an outpatient. She continued to have symptoms - sent for paracentesis but not enough fluid to drain. She was continued on bumex and eventually improved. She was last seen in the office on 2024. She has done well since then and has no required any change to her cardiac medications.    She presented to the emergency room yesterday afternoon after a syncopal episode witnessed by her daughter. She was in the shower when she became acutely weak.  She was placed on the toilet and became nauseated and diaphoretic. She had loss of bowel. Daughter checked pulse ox which noted HR >200. Patient does not have much memory of the event.    Lab work in the emergency room showed elevated waste  products creatinine 1.3. elevated digoxin level 1.5. TSH mildly elevated. WBC count 18K. Lactate elevated but normal procalcitonin.  U/A negative for infection. She is in atrial fibrillation with controlled rate.     Echo 10/6/2023    Left ventricular systolic function is normal. Calculated left ventricular EF = 56%    Normal right ventricular cavity size and systolic function noted.    The left atrial cavity is severely dilated.    The right atrial cavity is severely dilated.    Mild to moderate mitral valve regurgitation is present.    Moderate tricuspid valve regurgitation is present.    Calculated right ventricular systolic pressure from tricuspid regurgitation is 49 mmHg.    There is a small (<1cm) circumferential pericardial effusion. There is no evidence of cardiac tamponade.    Holter    An abnormal monitor study.    She was in atrial fibrillation throughout the time of this tracing with a heart rate range of 51-1 68 and an average heart rate of 90 bpm.    Symptoms correlated with atrial fibrillation.  However she was in atrial fibrillation throughout the time of this tracing.     Echo 7/12/2022  Calculated left ventricular EF = 55.2% Estimated left ventricular EF was in agreement with the calculated left ventricular EF. Left ventricular systolic function is normal.  Left ventricular wall thickness is consistent with moderate concentric hypertrophy.  Left atrial volume is moderately increased.  The right atrial cavity is severely dilated.  Moderate mitral valve regurgitation is present with an eccentric jet noted.  Moderate tricuspid valve regurgitation is present.  Estimated right ventricular systolic pressure from tricuspid regurgitation is normal (<35 mmHg). Calculated right ventricular systolic pressure from tricuspid regurgitation is 31 mmHg.    Stress 2018  Left ventricular ejection fraction is normal (Calculated EF = 68%).  Myocardial perfusion imaging indicates a normal myocardial perfusion study  with no evidence of ischemia.  Impressions are consistent with a low risk study.     Past Medical History:   Diagnosis Date    Aspergillus     Asthma     Atrial fibrillation     chronic    Atrial flutter     Bronchiectasis     C. difficile diarrhea 03/11/2017    CAD (coronary artery disease)     nonobstructive    Chronic diastolic CHF (congestive heart failure)     Colitis     Cough     Cryoglobulinemia     Dyspnea on exertion     Fall     Hyperlipidemia     Hypertension     Hyponatremia     Hypoxia     Infectious viral hepatitis     AGE 13    Left shoulder pain     Leg swelling     Lesion of lung     Malignant hyperthermia due to anesthesia     Mild tricuspid regurgitation     MR (mitral regurgitation)     mild    MVP (mitral valve prolapse)     Permanent atrial fibrillation     Pneumonia with the fungal infection aspergillosis 01/06/2017    Pneumothorax     SOB (shortness of breath)     UTI (urinary tract infection)     Wheeze     mild       Past Surgical History:   Procedure Laterality Date    BRONCHOSCOPY N/A 11/12/2016    Procedure: BRONCHOSCOPY WITH FLUORO, BRUSHINGS, BAL, AND BIOPSIES;  Surgeon: Rogelio Tucker MD;  Location: [facility] ENDOSCOPY;  Service:     BRONCHOSCOPY Bilateral 06/03/2017    Procedure: BRONCHOSCOPY with BAL ;  Surgeon: Sung King MD;  Location: [facility] ENDOSCOPY;  Service:     BRONCHOSCOPY N/A 12/17/2019    Procedure: BRONCHOSCOPY WITH WASHINGS;  Surgeon: Rogelio Tucker MD;  Location: [facility] ENDOSCOPY;  Service: Pulmonary    BRONCHOSCOPY N/A 07/15/2022    Procedure: BRONCHOSCOPY;  Surgeon: Rogelio Tucker MD;  Location: [facility] ENDOSCOPY;  Service: Pulmonary;  Laterality: N/A;  Pre: Pneumonia  Post: Pneumonia    BRONCHOSCOPY N/A 10/2/2023    Procedure: BRONCHOSCOPY WITH BRONCHIAL AVEOLAR LAVAGE;  Surgeon: Rogelio Tucker MD;  Location: [facility] ENDOSCOPY;  Service: Pulmonary;  Laterality: N/A;  PRE:BRONCHIECTASIS /   POST: SAME    CATARACT EXTRACTION EXTRACAPSULAR W/ INTRAOCULAR LENS IMPLANTATION       COLONOSCOPY      2013    D & C WITH SUCTION      HYSTERECTOMY      KNEE ARTHROSCOPY Left     Partial         Prior to Admission medications    Medication Sig Start Date End Date Taking? Authorizing Provider   acetaminophen (TYLENOL) 325 MG tablet Take 2 tablets by mouth Every 4 (Four) Hours As Needed for Moderate Pain. Indications: Pain 7/13/21  Yes Jane Mcclellan MD   albuterol sulfate  (90 Base) MCG/ACT inhaler Inhale 2 puffs Every 6 (Six) Hours As Needed for Wheezing or Shortness of Air. 4/20/23  Yes Marcie Robbins MD   apixaban (ELIQUIS) 2.5 MG tablet tablet Take 1 tablet by mouth Every 12 (Twelve) Hours. Indications: Other - full anticoagulation 6/9/23  Yes Marcie Robbins MD   azithromycin (ZITHROMAX) 250 MG tablet Take 1 tablet by mouth Daily.   Yes Jane Mcclellan MD   benzonatate (TESSALON) 200 MG capsule Take 1 capsule by mouth 3 (Three) Times a Day As Needed for Cough. Indications: Cough 4/10/23  Yes Jane Mcclellan MD   bumetanide (BUMEX) 2 MG tablet Take 1 tablet by mouth 2 (Two) Times a Day. Indications: Cardiac Failure, Edema 7/1/24  Yes Matt Rose MD   carvedilol (COREG) 3.125 MG tablet Take 1 tablet by mouth 2 (Two) Times a Day With Meals. Indications: Cardiac Failure, High Blood Pressure Disorder 12/28/23  Yes Marcie Robbins MD   citalopram (CeleXA) 20 MG tablet TAKE 1 TABLET BY MOUTH DAILY FOR MAJOR DEPRESSION 5/6/24  Yes Matt Rose MD   digoxin (LANOXIN) 125 MCG tablet Take 1 tablet by mouth Daily. Indications: Atrial Fibrillation, Cardiac Failure 12/28/23  Yes Marcie Robbins MD   fexofenadine (ALLEGRA) 180 MG tablet Take 1 tablet by mouth Daily. Indications: Hayfever 4/10/23  Yes Jane Mcclellan MD   fluticasone-salmeterol (ADVAIR HFA) 115-21 MCG/ACT inhaler Inhale 2 puffs 2 (Two) Times a Day. Indications: Asthma   Yes Jane Mcclelaln MD   guaiFENesin (MUCINEX) 600 MG 12 hr tablet Take 1 tablet by mouth 2 (Two) Times a Day.  Indications: Cough   Yes Jane Mcclellan MD   ipratropium-albuterol (DUO-NEB) 0.5-2.5 mg/3 ml nebulizer Take 3 mL by nebulization 2 (Two) Times a Day. Indications: Asthma 8/1/22  Yes Jane Mcclellan MD   Lactobacillus (FLORAJEN ACIDOPHILUS) capsule Take 1 tablet by mouth Daily. Indications: Heart failure, status change, with med/diet change   Yes Jane Mcclellan MD   O2 (OXYGEN) Inhale 2 L/min Every Night. Indications: Oxygen Therapy 1/1/23  Yes Jane Mcclellan MD   potassium chloride (K-DUR,KLOR-CON) 20 MEQ CR tablet Take 1 tablet by mouth Daily. 8/21/23  Yes Marcia Joel APRN   predniSONE (DELTASONE) 10 MG tablet Take 1 tablet by mouth Daily. 12/14/23  Yes Jane Mcclellan MD   sodium chloride 7 % nebulizer solution nebulizer solution Take 4 mL by nebulization 2 (Two) Times a Day. 12/2/23  Yes Karel Porras MD   cholecalciferol (VITAMIN D3) 25 MCG (1000 UT) tablet Take 1 tablet by mouth Daily. Indications: Vitamin D Deficiency    Jane Mcclellan MD   FeroSul 325 (65 Fe) MG tablet TAKE ONE TABLET BY MOUTH DAILY WITH BREAKFAST  Patient taking differently: Take 1 tablet by mouth Daily. 10/19/22   Matt Rose MD   Multiple Vitamins-Minerals (PRESERVISION AREDS PO) Take 1 tablet by mouth 2 (Two) Times a Day. Indications: Vitamin and/or Mineral Deficiency 8/12/22   Jane Mcclellan MD   promethazine (PHENERGAN) 6.25 MG/5ML solution oral solution Take 5 mL by mouth As Needed (cough). Indications: Nausea and Vomiting 12/8/23   Jane Mcclellan MD       Allergies   Allergen Reactions    Amlodipine Besylate Swelling    Aspirin GI Intolerance     Caused bleeding ulcers    Bactrim [Sulfamethoxazole-Trimethoprim] Nausea And Vomiting    Erythromycin Unknown (See Comments)     Patient does not recall type of reaction.    Levaquin [Levofloxacin] Unknown (See Comments)     Nausea      Nitrofurantoin Nausea Only and Nausea And Vomiting    Ramipril Other (See Comments)      Cough       Social History     Socioeconomic History    Marital status:    Tobacco Use    Smoking status: Never    Smokeless tobacco: Never    Tobacco comments:     caffiene daily   Vaping Use    Vaping status: Never Used   Substance and Sexual Activity    Alcohol use: Not Currently     Alcohol/week: 2.0 standard drinks of alcohol     Types: 1 Glasses of wine, 1 Shots of liquor per week    Drug use: No    Sexual activity: Defer     Partners: Male       Family History   Problem Relation Age of Onset    Hypertension Mother     Stroke Mother     Heart disease Father     Hypertension Father     Cancer Brother     Cancer Son        REVIEW OF SYSTEMS:   All systems reviewed.  Pertinent positives identified in HPI.  All other systems are negative.      Objective:     Vitals:    07/11/24 0623 07/11/24 0740 07/11/24 0743 07/11/24 0747   BP:  113/65     BP Location:  Left arm     Patient Position:  Lying     Pulse:  73 68 77   Resp:  16 18 20   Temp:  98.1 °F (36.7 °C)     TempSrc:  Oral     SpO2:  96% 95% 99%   Weight: 57.4 kg (126 lb 8.7 oz)      Height:         Body mass index is 21.72 kg/m².    Physical Exam:  Constitutional: She is oriented to person, place, and time. She appears well-developed. She does not appear ill.   HENT:   Head: Normocephalic and atraumatic. Head is without contusion.   Right Ear: Hearing normal. No drainage.   Left Ear: Hearing normal. No drainage.   Nose: No nasal deformity. No epistaxis.   Eyes: Lids are normal. Right eye exhibits no exudate. Left eye exhibits no exudate.  Neck: No JVD present.   Cardiovascular: Normal rate, irregular rhythm and normal heart sounds.    Pulses:       Posterior tibial pulses are 2+ on the right side, and 2+ on the left side.   Pulmonary/Chest: Effort normal and breath sounds normal.   Abdominal: Soft. Normal appearance and bowel sounds are normal. There is no tenderness.   Musculoskeletal: Normal range of motion.        Right shoulder: She exhibits no  deformity.        Left shoulder: She exhibits no deformity.   Neurological: She is alert and oriented to person, place, and time. She has normal strength.   Skin: Skin is warm, dry and intact. No rash noted.   Psychiatric: She has a normal mood and affect. Her behavior is normal. Thought content normal.   Vitals reviewed      Lab Review:     Results from last 7 days   Lab Units 07/11/24  0547 07/10/24  1304   SODIUM mmol/L 141 139   POTASSIUM mmol/L 3.2* 3.9   CHLORIDE mmol/L 104 96*   CO2 mmol/L 26.8 27.6   BUN mg/dL 22 16   CREATININE mg/dL 1.06* 1.30*   CALCIUM mg/dL 9.2 9.5   BILIRUBIN mg/dL  --  0.8   ALK PHOS U/L  --  109   ALT (SGPT) U/L  --  25   AST (SGOT) U/L  --  25   GLUCOSE mg/dL 109* 179*     Results from last 7 days   Lab Units 07/10/24  1446 07/10/24  1304   HSTROP T ng/L 42* 52*     Results from last 7 days   Lab Units 07/11/24  0547   WBC 10*3/mm3 10.15   HEMOGLOBIN g/dL 11.0*   HEMATOCRIT % 34.4   PLATELETS 10*3/mm3 328     Results from last 7 days   Lab Units 07/10/24  1304   INR  1.19*     Results from last 7 days   Lab Units 07/10/24  1304   MAGNESIUM mg/dL 2.4*               Current Facility-Administered Medications:     acetaminophen (TYLENOL) tablet 650 mg, 650 mg, Oral, Q4H PRN **OR** acetaminophen (TYLENOL) 160 MG/5ML oral solution 650 mg, 650 mg, Oral, Q4H PRN **OR** acetaminophen (TYLENOL) suppository 650 mg, 650 mg, Rectal, Q4H PRN, Carlene Mooney MD    albuterol (PROVENTIL) nebulizer solution 0.083% 2.5 mg/3mL, 2.5 mg, Nebulization, Q6H PRN, Carlene Mooney MD    apixaban (ELIQUIS) tablet 2.5 mg, 2.5 mg, Oral, Q12H, Carlene Mooney MD, 2.5 mg at 07/11/24 0847    azithromycin (ZITHROMAX) tablet 250 mg, 250 mg, Oral, Daily, Stingl, Carlene Wells MD, 250 mg at 07/11/24 0847    sennosides-docusate (PERICOLACE) 8.6-50 MG per tablet 2 tablet, 2 tablet, Oral, BID PRN **AND** polyethylene glycol (MIRALAX) packet 17 g, 17 g, Oral, Daily PRN **AND** bisacodyl (DULCOLAX) EC  tablet 5 mg, 5 mg, Oral, Daily PRN **AND** bisacodyl (DULCOLAX) suppository 10 mg, 10 mg, Rectal, Daily PRN, Carlene Mooney MD    budesonide-formoterol (SYMBICORT) 160-4.5 MCG/ACT inhaler 2 puff, 2 puff, Inhalation, BID - RT, Carlene Mooney MD, 2 puff at 07/11/24 0747    carvedilol (COREG) tablet 3.125 mg, 3.125 mg, Oral, BID With Meals, Carlene Mooney MD, 3.125 mg at 07/11/24 0847    citalopram (CeleXA) tablet 20 mg, 20 mg, Oral, Daily, Carlene Mooney MD    guaiFENesin (MUCINEX) 12 hr tablet 600 mg, 600 mg, Oral, BID, Carlene Mooney MD, 600 mg at 07/11/24 0847    ipratropium-albuterol (DUO-NEB) nebulizer solution 3 mL, 3 mL, Nebulization, BID, Carlene Mooney MD, 3 mL at 07/11/24 0743    lactobacillus acidophilus (RISAQUAD) capsule 1 capsule, 1 capsule, Oral, Daily, Carlene Mooney MD, 1 capsule at 07/11/24 0847    melatonin tablet 2.5 mg, 2.5 mg, Oral, Nightly PRN, Carlene Mooney MD    ondansetron ODT (ZOFRAN-ODT) disintegrating tablet 4 mg, 4 mg, Oral, Q6H PRN **OR** ondansetron (ZOFRAN) injection 4 mg, 4 mg, Intravenous, Q6H PRN, Carlene Mooney MD    predniSONE (DELTASONE) tablet 10 mg, 10 mg, Oral, Daily, Carlene Mooney MD, 10 mg at 07/11/24 0847    [COMPLETED] Insert Peripheral IV, , , Once **AND** sodium chloride 0.9 % flush 10 mL, 10 mL, Intravenous, PRN, Ced Leonard MD    Assessment and Plan:       Syncope and collapse - suspect a combination of orthostatic hypotension and vasovagal based on history. Brittle volume status and history of recurrent hospitalizations for volume overload. She received IVF yesterday. Bumex held currently. Reassess tomorrow. Daughter noted HR >200 based on pulse oximeter at time of event. Questionable accuracy.   Neurology also notes concern for seizure. Evaluation underway.   Permanent atrial fibrillation  , HR controlled. Labile INR on warfarin. Currently on apixaban and tolerating. No falls. No  s/s bleeding.   Chronic diastolic heart failure , euvolemic currently.   Non obstructive coronary artery disease by cardiac cath 2007. Normal stress 2018. Coronary artery calcifications noted on previous Ct.  Chronic lung disease, COPD, bronchiectasis. No acute exacerbation.   Mitral valve prolapse, mild MR.   Troponin above reference , not ACS or myocardial infarction.   Digoxin toxicity. Digoxin stopped. Repeat level in AM.    Orthostatic by vitals. Got IVF. Repeat orthostatics in AM. Confusion after episode concerning for postictal/seizure. Neurology evaluation underway.     Rosalba Morgan, PJ  07/11/24  10:46 EDT

## 2024-07-12 ENCOUNTER — APPOINTMENT (OUTPATIENT)
Dept: NEUROLOGY | Facility: HOSPITAL | Age: 89
End: 2024-07-12
Payer: MEDICARE

## 2024-07-12 LAB — DIGOXIN SERPL-MCNC: 1.2 NG/ML (ref 0.6–1.2)

## 2024-07-12 PROCEDURE — 80162 ASSAY OF DIGOXIN TOTAL: CPT | Performed by: NURSE PRACTITIONER

## 2024-07-12 PROCEDURE — 94799 UNLISTED PULMONARY SVC/PX: CPT

## 2024-07-12 PROCEDURE — 97161 PT EVAL LOW COMPLEX 20 MIN: CPT

## 2024-07-12 PROCEDURE — 99231 SBSQ HOSP IP/OBS SF/LOW 25: CPT | Performed by: PHYSICIAN ASSISTANT

## 2024-07-12 PROCEDURE — 94760 N-INVAS EAR/PLS OXIMETRY 1: CPT

## 2024-07-12 PROCEDURE — 95816 EEG AWAKE AND DROWSY: CPT

## 2024-07-12 PROCEDURE — 99232 SBSQ HOSP IP/OBS MODERATE 35: CPT | Performed by: INTERNAL MEDICINE

## 2024-07-12 PROCEDURE — 94664 DEMO&/EVAL PT USE INHALER: CPT

## 2024-07-12 PROCEDURE — 97530 THERAPEUTIC ACTIVITIES: CPT

## 2024-07-12 PROCEDURE — 63710000001 PREDNISONE PER 5 MG: Performed by: INTERNAL MEDICINE

## 2024-07-12 PROCEDURE — 95816 EEG AWAKE AND DROWSY: CPT | Performed by: STUDENT IN AN ORGANIZED HEALTH CARE EDUCATION/TRAINING PROGRAM

## 2024-07-12 RX ADMIN — CARVEDILOL 3.12 MG: 3.12 TABLET, FILM COATED ORAL at 08:29

## 2024-07-12 RX ADMIN — APIXABAN 2.5 MG: 2.5 TABLET, FILM COATED ORAL at 20:18

## 2024-07-12 RX ADMIN — GUAIFENESIN 600 MG: 600 TABLET, EXTENDED RELEASE ORAL at 08:29

## 2024-07-12 RX ADMIN — CARVEDILOL 3.12 MG: 3.12 TABLET, FILM COATED ORAL at 17:52

## 2024-07-12 RX ADMIN — AZITHROMYCIN 250 MG: 250 TABLET, FILM COATED ORAL at 08:29

## 2024-07-12 RX ADMIN — CITALOPRAM 20 MG: 20 TABLET, FILM COATED ORAL at 08:29

## 2024-07-12 RX ADMIN — APIXABAN 2.5 MG: 2.5 TABLET, FILM COATED ORAL at 08:29

## 2024-07-12 RX ADMIN — GUAIFENESIN 600 MG: 600 TABLET, EXTENDED RELEASE ORAL at 20:18

## 2024-07-12 RX ADMIN — IPRATROPIUM BROMIDE AND ALBUTEROL SULFATE 3 ML: .5; 3 SOLUTION RESPIRATORY (INHALATION) at 07:35

## 2024-07-12 RX ADMIN — IPRATROPIUM BROMIDE AND ALBUTEROL SULFATE 3 ML: .5; 3 SOLUTION RESPIRATORY (INHALATION) at 19:43

## 2024-07-12 RX ADMIN — PREDNISONE 10 MG: 10 TABLET ORAL at 08:29

## 2024-07-12 RX ADMIN — BUDESONIDE AND FORMOTEROL FUMARATE DIHYDRATE 2 PUFF: 160; 4.5 AEROSOL RESPIRATORY (INHALATION) at 07:40

## 2024-07-12 RX ADMIN — Medication 1 CAPSULE: at 08:29

## 2024-07-12 RX ADMIN — BUDESONIDE AND FORMOTEROL FUMARATE DIHYDRATE 2 PUFF: 160; 4.5 AEROSOL RESPIRATORY (INHALATION) at 19:52

## 2024-07-12 NOTE — THERAPY EVALUATION
Patient Name: Agnieszka Rizzo  : 1930    MRN: [id number]                              Today's Date: 2024       Admit Date: 7/10/2024    Visit Dx:     ICD-10-CM ICD-9-CM   1. Syncope and collapse  R55 780.2   2. History of atrial fibrillation  Z86.79 V12.59   3. Acute kidney injury  N17.9 584.9   4. Leukocytosis, unspecified type  D72.829 288.60   5. Elevated digoxin level  R78.89 796.0   6. Elevated troponin  R79.89 790.6     Patient Active Problem List   Diagnosis    Benign essential hypertension    Chronic coronary artery disease    Familial hypercholesterolemia    Mitral valve insufficiency    Ventricular premature beats    Ventricular tachycardia    Chronic respiratory failure with hypoxia    Acid-fast bacteria present    DNR (do not resuscitate)    Acute on chronic diastolic heart failure    Asymptomatic bacteriuria    Iron deficiency anemia    Hypokalemia    Bronchiectasis    Pneumonia of both lungs due to infectious organism    KINA (mycobacterium avium-intracellulare)    Permanent atrial fibrillation    Anxiety    Exposure to hepatitis A    Medicare annual wellness visit, subsequent    Abdominal pain    Herpes infection    Moderate asthma with acute exacerbation    Bronchitis, acute, with bronchospasm    Abnormal EKG    Fall    Laceration of left upper extremity    Multiple skin tears    Alteration in anticoagulation    Blind right eye    Clinical diagnosis of COVID-19    Pneumonia of right lung due to infectious organism, unspecified part of lung    COPD (chronic obstructive pulmonary disease)    Dizziness    Bronchiectasis    Sepsis due to pneumonia    Acute pulmonary edema    Electrolyte imbalance    CHF (congestive heart failure)    Chronic diastolic CHF (congestive heart failure)    Syncope and collapse     Past Medical History:   Diagnosis Date    Aspergillus     Asthma     Atrial fibrillation     chronic    Atrial flutter     Bronchiectasis     C. difficile diarrhea 2017    CAD  (coronary artery disease)     nonobstructive    Chronic diastolic CHF (congestive heart failure)     Colitis     Cough     Cryoglobulinemia     Dyspnea on exertion     Fall     Hyperlipidemia     Hypertension     Hyponatremia     Hypoxia     Infectious viral hepatitis     AGE 13    Left shoulder pain     Leg swelling     Lesion of lung     Malignant hyperthermia due to anesthesia     Mild tricuspid regurgitation     MR (mitral regurgitation)     mild    MVP (mitral valve prolapse)     Permanent atrial fibrillation     Pneumonia with the fungal infection aspergillosis 01/06/2017    Pneumothorax     SOB (shortness of breath)     UTI (urinary tract infection)     Wheeze     mild     Past Surgical History:   Procedure Laterality Date    BRONCHOSCOPY N/A 11/12/2016    Procedure: BRONCHOSCOPY WITH FLUORO, BRUSHINGS, BAL, AND BIOPSIES;  Surgeon: Rogelio Tucker MD;  Location: [facility] ENDOSCOPY;  Service:     BRONCHOSCOPY Bilateral 06/03/2017    Procedure: BRONCHOSCOPY with BAL ;  Surgeon: Sung King MD;  Location: [facility] ENDOSCOPY;  Service:     BRONCHOSCOPY N/A 12/17/2019    Procedure: BRONCHOSCOPY WITH WASHINGS;  Surgeon: Rogelio Tucker MD;  Location: [facility] ENDOSCOPY;  Service: Pulmonary    BRONCHOSCOPY N/A 07/15/2022    Procedure: BRONCHOSCOPY;  Surgeon: Rogelio Tucker MD;  Location: [facility] ENDOSCOPY;  Service: Pulmonary;  Laterality: N/A;  Pre: Pneumonia  Post: Pneumonia    BRONCHOSCOPY N/A 10/2/2023    Procedure: BRONCHOSCOPY WITH BRONCHIAL AVEOLAR LAVAGE;  Surgeon: Rogelio Tucker MD;  Location: [facility] ENDOSCOPY;  Service: Pulmonary;  Laterality: N/A;  PRE:BRONCHIECTASIS /   POST: SAME    CATARACT EXTRACTION EXTRACAPSULAR W/ INTRAOCULAR LENS IMPLANTATION      COLONOSCOPY      2013    D & C WITH SUCTION      HYSTERECTOMY      KNEE ARTHROSCOPY Left     Partial      General Information       Row Name 07/12/24 5740          Physical Therapy Time and Intention    Document Type evaluation  -     Mode of Treatment individual  therapy;physical therapy  -       Row Name 07/12/24 1333          General Information    Patient Profile Reviewed yes  -SM     Prior Level of Function independent:  -     Existing Precautions/Restrictions fall  -       Row Name 07/12/24 1333          Living Environment    People in Home alone  -[facility] Name 07/12/24 1333          Home Main Entrance    Number of Stairs, Main Entrance none  -       Row Name 07/12/24 1333          Cognition    Orientation Status (Cognition) oriented x 3  -[facility] Name 07/12/24 1333          Safety Issues, Functional Mobility    Impairments Affecting Function (Mobility) endurance/activity tolerance;strength;balance  -               User Key  (r) = Recorded By, (t) = Taken By, (c) = Cosigned By      Initials Name Provider Type     Nohelia Giraldo PT Physical Therapist                   Mobility       [facility] Name 07/12/24 1334          Bed Mobility    Bed Mobility supine-sit  -     Supine-Sit Lander (Bed Mobility) standby assist  -     Assistive Device (Bed Mobility) bed rails  -     Comment, (Bed Mobility) Patient sitting at EOB at end of session  -[facility] Name 07/12/24 1334          Sit-Stand Transfer    Sit-Stand Lander (Transfers) contact guard  -[facility] Name 07/12/24 1334          Gait/Stairs (Locomotion)    Lander Level (Gait) contact guard  -     Assistive Device (Gait) --  no AD  -     Distance in Feet (Gait) 120  -SM     Deviations/Abnormal Patterns (Gait) gait speed decreased;cecilia decreased  -     Comment, (Gait/Stairs) Gait slow and gaurded. After about 100ft patient stopped and reported feeling weak and lightheaded. Chair immediately brought behind patient to sit and patient taken back to room. BP assessed once returned to sitting EOB to be 124/69. Patient reports she felt she just got really weak for a moment. When sitting EOB patient oriented but had difficulty recalling the name of where she lived.  -                User Key  (r) = Recorded By, (t) = Taken By, (c) = Cosigned By      Initials Name Provider Type     Nohelia Giraldo PT Physical Therapist                   Obj/Interventions       Row Name 07/12/24 1336          Range of Motion Comprehensive    General Range of Motion bilateral lower extremity ROM WFL  -[facility] Name 07/12/24 1336          Strength Comprehensive (MMT)    General Manual Muscle Testing (MMT) Assessment lower extremity strength deficits identified  -     Comment, General Manual Muscle Testing (MMT) Assessment Generalized B LE weakness  -[facility] Name 07/12/24 1336          Balance    Balance Assessment sitting static balance;sitting dynamic balance;sit to stand dynamic balance;standing static balance;standing dynamic balance  -     Static Sitting Balance supervision  -     Dynamic Sitting Balance supervision  -     Position, Sitting Balance sitting edge of bed  -     Sit to Stand Dynamic Balance contact guard  -     Static Standing Balance contact guard  -     Dynamic Standing Balance contact guard  -     Position/Device Used, Standing Balance unsupported  -     Balance Interventions sitting;standing;sit to stand;dynamic;static  -SM               User Key  (r) = Recorded By, (t) = Taken By, (c) = Cosigned By      Initials Name Provider Type     Nohelia Giraldo PT Physical Therapist                   Goals/Plan       [facility] Name 07/12/24 1341          Bed Mobility Goal 1 (PT)    Activity/Assistive Device (Bed Mobility Goal 1, PT) bed mobility activities, all  -SM     Frazer Level/Cues Needed (Bed Mobility Goal 1, PT) independent  -SM     Time Frame (Bed Mobility Goal 1, PT) 1 week  -[facility] Name 07/12/24 1341          Transfer Goal 1 (PT)    Activity/Assistive Device (Transfer Goal 1, PT) sit-to-stand/stand-to-sit;bed-to-chair/chair-to-bed  -SM     Frazer Level/Cues Needed (Transfer Goal 1, PT) independent  -SM     Time Frame (Transfer Goal 1,  PT) 1 week  -       Row Name 07/12/24 1341          Gait Training Goal 1 (PT)    Activity/Assistive Device (Gait Training Goal 1, PT) gait (walking locomotion)  -SM     Foster Level (Gait Training Goal 1, PT) independent  -SM     Distance (Gait Training Goal 1, PT) 300ft  -SM     Time Frame (Gait Training Goal 1, PT) 1 week  -               User Key  (r) = Recorded By, (t) = Taken By, (c) = Cosigned By      Initials Name Provider Type     Nohelia Giraldo, PT Physical Therapist                   Clinical Impression       Row Name 07/12/24 1319          Pain    Pretreatment Pain Rating 0/10 - no pain  -SM     Posttreatment Pain Rating 0/10 - no pain  -SM       Row Name 07/12/24 4734          Plan of Care Review    Plan of Care Reviewed With patient  -     Outcome Evaluation Patient is a 89 y.o female who presented to [facility] following a syncopal episode while at home in the shower where he daughter was able to assist her to a seated position. Patient lives at an [facility] where she reports she usually does not use an AD but has a rwx available as needed. Patient sat up to EOB with SBA. Patient sat at EOB without difficulty while talking to neurology PA. Patient then stood from EOB with CGA. Patient began ambulating around the unit with no AD and CGA. Gait slow and gaurded. After about 100ft patient stopped and reported feeling weak and lightheaded. Chair immediately brought behind patient to sit and patient taken back to room. BP assessed once returned to sitting EOB to be 124/69. Patient reports she felt she just got really weak for a moment. When sitting EOB patient oriented but had difficulty recalling the name of where she lived. Patient requested to stay sitting up at EOB at end of session. Patient eager to return home and reports her daughter can stay with her if needed at d/c. Recommend home with assist and HHPT at d/c. Acute PT will continue to monitor.  -       Row Name 07/12/24 9878          Therapy  Assessment/Plan (PT)    Rehab Potential (PT) good, to achieve stated therapy goals  -     Criteria for Skilled Interventions Met (PT) yes  -SM     Therapy Frequency (PT) 6 times/wk  -       Row Name 07/12/24 1337          Vital Signs    Pre Patient Position Supine  -SM     Intra Patient Position Standing  -SM     Post Patient Position Sitting  -SM       Row Name 07/12/24 1337          Positioning and Restraints    Pre-Treatment Position in bed  -SM     Post Treatment Position bed  -SM     In Bed notified nsg;encouraged to call for assist;call light within reach;exit alarm on;sitting EOB;with nsg  -               User Key  (r) = Recorded By, (t) = Taken By, (c) = Cosigned By      Initials Name Provider Type    Nohelia Murillo PT Physical Therapist                   Outcome Measures       Row Name 07/12/24 1341          How much help from another person do you currently need...    Turning from your back to your side while in flat bed without using bedrails? 4  -SM     Moving from lying on back to sitting on the side of a flat bed without bedrails? 3  -SM     Moving to and from a bed to a chair (including a wheelchair)? 3  -SM     Standing up from a chair using your arms (e.g., wheelchair, bedside chair)? 3  -SM     Climbing 3-5 steps with a railing? 2  -SM     To walk in hospital room? 3  -SM     AM-PAC 6 Clicks Score (PT) 18  -SM     Highest Level of Mobility Goal 6 --> Walk 10 steps or more  -               User Key  (r) = Recorded By, (t) = Taken By, (c) = Cosigned By      Initials Name Provider Type    Nohelia Murillo PT Physical Therapist                                 Physical Therapy Education       Title: PT OT SLP Therapies (Done)       Topic: Physical Therapy (Done)       Point: Mobility training (Done)       Learning Progress Summary             Patient Acceptance, E, VU by CHALO at 7/12/2024 1341    Acceptance, E, NR by SOUMYA at 7/11/2024 1608                         Point: Home exercise  program (Done)       Learning Progress Summary             Patient Acceptance, E, VU by  at 7/12/2024 1341    Acceptance, E, NR by CN at 7/11/2024 1608                         Point: Body mechanics (Done)       Learning Progress Summary             Patient Acceptance, E, VU by  at 7/12/2024 1341    Acceptance, E, NR by CN at 7/11/2024 1608                         Point: Precautions (Done)       Learning Progress Summary             Patient Acceptance, E, VU by  at 7/12/2024 1341    Acceptance, E, NR by CN at 7/11/2024 1608                                         User Key       Initials Effective Dates Name Provider Type Discipline    SOUMYA 09/08/22 -  Aliya Malloy RN Registered Nurse Nurse     05/02/22 -  Nohelia Giraldo, EVELIO Physical Therapist PT                  PT Recommendation and Plan     Plan of Care Reviewed With: patient  Outcome Evaluation: Patient is a 89 y.o female who presented to [facility] following a syncopal episode while at home in the shower where he daughter was able to assist her to a seated position. Patient lives at an [facility] where she reports she usually does not use an AD but has a rwx available as needed. Patient sat up to EOB with SBA. Patient sat at EOB without difficulty while talking to neurology PA. Patient then stood from EOB with CGA. Patient began ambulating around the unit with no AD and CGA. Gait slow and gaurded. After about 100ft patient stopped and reported feeling weak and lightheaded. Chair immediately brought behind patient to sit and patient taken back to room. BP assessed once returned to sitting EOB to be 124/69. Patient reports she felt she just got really weak for a moment. When sitting EOB patient oriented but had difficulty recalling the name of where she lived. Patient requested to stay sitting up at EOB at end of session. Patient eager to return home and reports her daughter can stay with her if needed at d/c. Recommend home with assist and HHPT at d/c. Acute PT  will continue to monitor.     Time Calculation:         PT Charges       Row Name 07/12/24 1342             Time Calculation    Start Time 1311  -SM      Stop Time 1329  -SM      Time Calculation (min) 18 min  -SM      PT Received On 07/12/24  -      PT - Next Appointment 07/13/24  -      PT Goal Re-Cert Due Date 07/19/24  -         Time Calculation- PT    Total Timed Code Minutes- PT 12 minute(s)  -SM         Timed Charges    65479 - PT Therapeutic Activity Minutes 12  -SM         Total Minutes    Timed Charges Total Minutes 12  -SM       Total Minutes 12  -SM                User Key  (r) = Recorded By, (t) = Taken By, (c) = Cosigned By      Initials Name Provider Type     Nohelia Giraldo, EVELIO Physical Therapist                  Therapy Charges for Today       Code Description Service Date Service Provider Modifiers Qty    71291210982 HC PT THERAPEUTIC ACT EA 15 MIN 7/12/2024 Nohelia Giraldo, PT GP 1    85014455577 HC PT EVAL LOW COMPLEXITY 3 7/12/2024 Nohelia Giraldo, PT GP 1            PT G-Codes  AM-PAC 6 Clicks Score (PT): 18  PT Discharge Summary  Anticipated Discharge Disposition (PT): home with assist, home with home health    Nohelia Giraldo PT  7/12/2024

## 2024-07-12 NOTE — PROGRESS NOTES
Discharge Planning Assessment  [facility]     Patient Name: Agnieszka Rizzo  MRN: [id number]  Today's Date: 7/12/2024    Admit Date: 7/10/2024    Plan: Return to [facility] with a new referral to [facility]   Discharge Needs Assessment       Row Name 07/12/24 1542       Living Environment    People in Home facility resident    Name(s) of People in Home independent living at [facility] assisted [facility]    Current Living Arrangements assisted living facility    Family Caregiver if Needed child(graciela), adult    Family Caregiver Names daughter Michelle Zaldivar [phone]    Quality of Family Relationships supportive;involved;helpful    Able to Return to Prior Arrangements yes       Transition Planning    Patient/Family Anticipates Transition to home with help/services    Patient/Family Anticipated Services at Transition home health care    Transportation Anticipated family or friend will provide       Discharge Needs Assessment    Equipment Currently Used at Home rollator;wheelchair;oxygen;nebulizer    Concerns to be Addressed discharge planning    Provided Post Acute Provider List? Yes    Post Acute Provider List Home Health    Provided Post Acute Provider Quality & Resource List? Yes    Post Acute Provider Quality and Resource List Home Health    Delivered To Support Person    Support Person daughter Michelle Zaldivar [phone]    Method of Delivery In person                   Discharge Plan       Row Name 07/12/24 9102       Plan    Plan Return to [facility] with a new referral to [facility]    Plan Comments Spoke with patient and daughter Michelle Zaldivar [phone] at bedside, for discharge planning. She is living at [facility], she is in independent living at [facility]. [facility] provides her with her meals and assist her to the dining room and helps her with taking her medications. She uses home oxygen at [facility] from [employer],  she also has a nebulizer. Daughter stated she would like [facility] to follow. New referral placed in [facility]. She will return home to [facility] and her son will transport. Marko BERNAL                  Continued Care and Services - Admitted Since 7/10/2024       Home Medical Care       Service Provider Request Status Selected Services Address Phone Fax Patient Preferred    [facility] Pending - Request Sent N/A [address] [facility], [address], [facility] [address] [phone] --                     Demographic Summary       Row Name 07/12/24 1541       General Information    Preferred Language English      Row Name 07/12/24 1536       General Information    Admission Type inpatient    Arrived From emergency department    Referral Source admission list    Reason for Consult discharge planning                   Functional Status       Row Name 07/12/24 1541       Functional Status    Usual Activity Tolerance moderate    Current Activity Tolerance fair       Functional Status, IADL    Medications assistive person    Meal Preparation completely dependent    Housekeeping completely dependent    Laundry completely dependent    Shopping completely dependent                   Psychosocial    No documentation.                  Abuse/Neglect    No documentation.                  Legal    No documentation.                  Substance Abuse    No documentation.                  Patient Forms    No documentation.                     Che Castrejon, RN

## 2024-07-12 NOTE — PROGRESS NOTES
"DOS: 2024  NAME: Agnieszka Rizzo   : 1930  PCP: Matt Rose MD  Chief Complaint   Patient presents with    Syncope       Chief complaint: syncope  Subjective: Pt feels well.  She is sitting up on side of bed.  No numbness, tingling, weakness to note    Objective:  Vital signs: /62 (BP Location: Left arm, Patient Position: Lying)   Pulse 55   Temp 97.9 °F (36.6 °C) (Oral)   Resp 20   Ht 162.6 cm (64\")   Wt 58.8 kg (129 lb 10.1 oz)   SpO2 93%   BMI 22.25 kg/m²      Gen: NAD, vitals reviewed  MS: oriented x3, recent/remote memory intact, normal attention/concentration, language intact, no neglect.  CN: visual acuity grossly normal, PERRL, EOMI, no facial droop, no dysarthria  Motor: MAEW, no assymetry, normal tone  Sensory: intact to light touch all 4 ext.    ROS:  No weakness, numbness  No fevers, chills      Laboratory results:  Lab Results   Component Value Date    GLUCOSE 109 (H) 2024    CALCIUM 9.2 2024     2024    K 3.2 (L) 2024    CO2 26.8 2024     2024    BUN 22 2024    CREATININE 1.06 (H) 2024    EGFRIFAFRI 87 2019    EGFRIFNONA 67 2021    BCR 20.8 2024    ANIONGAP 10.2 2024     Lab Results   Component Value Date    WBC 10.15 2024    HGB 11.0 (L) 2024    HCT 34.4 2024    MCV 97.5 (H) 2024     2024     Lab Results   Component Value Date    LDL 85 2021     (H) 2020    LDL 86 2019            Review of labs: Sodium 141, potassium 3.2, CO2 26, creatinine 1.06, BUN 22, lactate 2.3 improved to 1.6, WBCs 10,000, hemoglobin 11, platelets 328, UA unremarkable, digoxin 1.5 and 1.2 today    Review and interpretation of imaging: MRI reviewed and there is mild microvascular disease, no acute infarct     Workup to date:    Diagnoses:  Syncope  HF  COPD    Impression: 89-year-old female with past medical history of CHF, COPD, asthma, permanent A-fib on " Eliquis 2.5 twice daily, CAD who comes to the hospital with a witnessed syncopal event.  She was in the shower became so weak and lightheaded flushed diaphoretic nauseous endorsed palpitations before syncopal episode with bowel incontinence.  There is report of confusion thereafter.  Dtr reported pulse was >200 then.  She did have elevated digoxin level.  Other things are hypokalemia.  Concern for vasovagal etiology too.  There is no history of prior seizures.  While the story is some suspicious for seizure,also suspicion for vasovagal etilogy, and furthermore MRI and EEG are unrevealing there is generalized slowing no assymetry    Prefer f/u in clinic, but low threshold to start AED if repeated spells      Thank you for this consultation.  Discussed above plan with neuro attending, Dr. Bliss who agrees with above plan.  Neurology team is available for concerns or questions.

## 2024-07-12 NOTE — PROGRESS NOTES
Name: Agnieszka Rizzo ADMIT: 7/10/2024   : 1930  PCP: Matt Rose MD    MRN: [id number] LOS: 0 days   AGE/SEX: 89 y.o. female  ROOM: [facility]     Subjective   Subjective   No dyspnea or CP  Had previous testings  To work with therapy     Objective   Objective   Vital Signs  Temp:  [97.5 °F (36.4 °C)-99 °F (37.2 °C)] 99 °F (37.2 °C)  Heart Rate:  [55-89] 68  Resp:  [16-20] 18  BP: (106-138)/(55-79) 138/70  SpO2:  [91 %-100 %] 96 %  on   ;   Device (Oxygen Therapy): room air  Body mass index is 22.25 kg/m².  Physical Exam  Constitutional:       General: She is not in acute distress.     Appearance: Normal appearance. She is not toxic-appearing.   Cardiovascular:      Rate and Rhythm: Normal rate. Rhythm irregular.   Pulmonary:      Effort: Pulmonary effort is normal.   Abdominal:      General: Abdomen is flat. Bowel sounds are normal.      Palpations: Abdomen is soft.   Skin:     General: Skin is warm.   Neurological:      General: No focal deficit present.      Mental Status: She is alert and oriented to person, place, and time.   Psychiatric:         Mood and Affect: Mood normal.         Behavior: Behavior normal.     Elderly     Results Review     I reviewed the patient's new clinical results.  Results from last 7 days   Lab Units 24  0547 07/10/24  1304   WBC 10*3/mm3 10.15 18.61*   HEMOGLOBIN g/dL 11.0* 12.7   PLATELETS 10*3/mm3 328 257     Results from last 7 days   Lab Units 24  0547 07/10/24  1304   SODIUM mmol/L 141 139   POTASSIUM mmol/L 3.2* 3.9   CHLORIDE mmol/L 104 96*   CO2 mmol/L 26.8 27.6   BUN mg/dL 22 16   CREATININE mg/dL 1.06* 1.30*   GLUCOSE mg/dL 109* 179*   EGFR mL/min/1.73 49.1* 38.4*     Results from last 7 days   Lab Units 07/10/24  1304   ALBUMIN g/dL 4.0   BILIRUBIN mg/dL 0.8   ALK PHOS U/L 109   AST (SGOT) U/L 25   ALT (SGPT) U/L 25     Results from last 7 days   Lab Units 24  0547 07/10/24  1304   CALCIUM mg/dL 9.2 9.5   ALBUMIN g/dL  --  4.0   MAGNESIUM  "mg/dL  --  2.4*     Results from last 7 days   Lab Units 07/10/24  1813 07/10/24  1447 07/10/24  1304   PROCALCITONIN ng/mL  --   --  0.09   LACTATE mmol/L 1.6 2.3*  --      No results found for: \"HGBA1C\", \"POCGLU\"    MRI Brain Without Contrast    Result Date: 7/11/2024  Mild to moderate small vessel ischemic disease is noted. There is no evidence of an acute infarct.  This report was finalized on 7/11/2024 4:48 PM by Dr. Lon Velasquez M.D on Workstation: [id number]     Scheduled Medications  apixaban, 2.5 mg, Oral, Q12H  azithromycin, 250 mg, Oral, Daily  budesonide-formoterol, 2 puff, Inhalation, BID - RT  carvedilol, 3.125 mg, Oral, BID With Meals  citalopram, 20 mg, Oral, Daily  guaiFENesin, 600 mg, Oral, BID  ipratropium-albuterol, 3 mL, Nebulization, BID  lactobacillus acidophilus, 1 capsule, Oral, Daily  predniSONE, 10 mg, Oral, Daily    Infusions   Diet  Diet: Cardiac; Healthy Heart (2-3 Na+); Fluid Consistency: Thin (IDDSI 0)       Assessment/Plan     Active Hospital Problems    Diagnosis  POA    **Syncope and collapse [R55]  Yes    Chronic diastolic CHF (congestive heart failure) [I50.32]  Yes    DNR (do not resuscitate) [Z66]  Yes    Benign essential hypertension [I10]  Yes    Chronic coronary artery disease [I25.10]  Yes      Resolved Hospital Problems   No resolved problems to display.       89 y.o. female admitted with Syncope and collapse.      07/12/24  Monitoring off diuretics per Cardiology     Syncope  -does not sound cardiac based on her description. Could be component of orthostasis as well as vasovagal though bowel incontinence would be rare in either instance  -Cardiology, Neuro following  -EEG, MRI unreveling  Per Neurology no AED but consideration if has repeated spells. To follow with Neurology in clinic     Permanent Afib  -RC: Coreg  -AC: apixaban 2.5mg    COPD  -home inhalers and azithromycin     Chronic Diastolic CHF  Bumex held per Cardiology but possible restarting " tomorrow    Digoxin toxicity  Dig held     Hypokalemia, replete         DVT prophylaxis: Eliquis (home med)  Discussed with patient and care team on multidisciplinary rounds.  Anticipated discharge home, when cleared by consultants            Prince Hubbard MD  [facility]  07/12/24  16:54 EDT

## 2024-07-12 NOTE — NURSING NOTE
Cwon consult received.  Patient with a healing skin  tear to LLE.  Stable, no drainage and extending redness or swelling suggesting cellulitis.  Will recommend vaseline gauze and an optifoam, changing every 3 days.  I have added wound care orders and performed wound care at time of visit which pt tolerated.  She and daughter at bedside are agreeable to plan and appreciative of visit.     Please call with any questions.

## 2024-07-12 NOTE — PLAN OF CARE
Goal Outcome Evaluation:    Problem: Adult Inpatient Plan of Care  Goal: Absence of Hospital-Acquired Illness or Injury  Intervention: Identify and Manage Fall Risk  Recent Flowsheet Documentation  Taken 7/12/2024 1600 by Aliya Malloy RN  Safety Promotion/Fall Prevention: safety round/check completed  Taken 7/12/2024 1400 by Aliya Malloy RN  Safety Promotion/Fall Prevention: safety round/check completed  Taken 7/12/2024 1233 by Aliya Malloy RN  Safety Promotion/Fall Prevention:   safety round/check completed   toileting scheduled   fall prevention program maintained   clutter free environment maintained  Taken 7/12/2024 1000 by Aliya Malloy RN  Safety Promotion/Fall Prevention: safety round/check completed  Taken 7/12/2024 0832 by Aliya Malloy RN  Safety Promotion/Fall Prevention:   activity supervised   clutter free environment maintained   Plan of Care Reviewed With: patient alert, room air, had an EEG, fall precautions maintained, call light within reach

## 2024-07-12 NOTE — PROGRESS NOTES
"    Patient Name: Agnieszka Rizzo  :1930  89 y.o.      Patient Care Team:  Matt Rose MD as PCP - General (Family Medicine)  Marcie Robbins MD as Consulting Physician (Cardiology)  Nilesh Danielle MD as Consulting Physician (Urology)  Lina Morris RPH as Pharmacist  Lev Mckeon PharmD as Pharmacist (Pharmacy)  Rogelio Tucker MD as Consulting Physician (Pulmonary Disease)    Chief Complaint: syncope    Interval History: MRI yesterday- no CVA seen.       Objective   Vital Signs  Temp:  [97.5 °F (36.4 °C)-98.2 °F (36.8 °C)] 97.9 °F (36.6 °C)  Heart Rate:  [55-89] 55  Resp:  [16-20] 20  BP: (106-135)/(55-69) 122/62    Intake/Output Summary (Last 24 hours) at 2024 0934  Last data filed at 2024 0600  Gross per 24 hour   Intake 120 ml   Output 240 ml   Net -120 ml     Flowsheet Rows      Flowsheet Row First Filed Value   Admission Height 162.6 cm (64\") Documented at 07/10/2024 1305   Admission Weight 59 kg (130 lb) Documented at 07/10/2024 1305            Physical Exam:   General Appearance:    comfortable   Lungs:     Clear to auscultation.  Normal respiratory effort and rate.      Heart:    Regular rhythm and normal rate, normal S1 and S2, no murmurs, gallops or rubs.     Chest Wall:    No abnormalities observed   Abdomen:     Soft, nontender, positive bowel sounds.     Extremities:   no cyanosis, clubbing or edema.  No marked joint deformities.       Results Review:    Results from last 7 days   Lab Units 24  0547   SODIUM mmol/L 141   POTASSIUM mmol/L 3.2*   CHLORIDE mmol/L 104   CO2 mmol/L 26.8   BUN mg/dL 22   CREATININE mg/dL 1.06*   GLUCOSE mg/dL 109*   CALCIUM mg/dL 9.2     Results from last 7 days   Lab Units 07/10/24  1446 07/10/24  1304   HSTROP T ng/L 42* 52*     Results from last 7 days   Lab Units 24  0547   WBC 10*3/mm3 10.15   HEMOGLOBIN g/dL 11.0*   HEMATOCRIT % 34.4   PLATELETS 10*3/mm3 328     Results from last 7 days   Lab Units 07/10/24  1304   INR  1.19* "     Results from last 7 days   Lab Units 07/10/24  1304   MAGNESIUM mg/dL 2.4*                   Medication Review:   apixaban, 2.5 mg, Oral, Q12H  azithromycin, 250 mg, Oral, Daily  budesonide-formoterol, 2 puff, Inhalation, BID - RT  carvedilol, 3.125 mg, Oral, BID With Meals  citalopram, 20 mg, Oral, Daily  guaiFENesin, 600 mg, Oral, BID  ipratropium-albuterol, 3 mL, Nebulization, BID  lactobacillus acidophilus, 1 capsule, Oral, Daily  predniSONE, 10 mg, Oral, Daily              Assessment & Plan   Active Hospital Problems    Diagnosis  POA    **Syncope and collapse [R55]  Yes    Chronic diastolic CHF (congestive heart failure) [I50.32]  Yes    DNR (do not resuscitate) [Z66]  Yes    Benign essential hypertension [I10]  Yes    Chronic coronary artery disease [I25.10]  Yes      Resolved Hospital Problems   No resolved problems to display.       Syncope and collapse - suspect a combination of orthostatic hypotension and vasovagal based on history. Brittle volume status and history of recurrent hospitalizations for volume overload.  Orthostatics this morning look reasonable with lying blood pressure 125/62, standing 106/55.  Bumex held currently. Reassess tomorrow. Daughter noted HR >200 based on pulse oximeter at time of event. Questionable accuracy.   Neurology also notes concern for seizure. Evaluation underway. Confusion after episode concerning for postictal/seizure. Neurology evaluation underway.   Permanent atrial fibrillation  - HR controlled. Labile INR on warfarin. Currently on apixaban and tolerating. No falls. No s/s bleeding.   Chronic diastolic heart failure , euvolemic currently.   Non obstructive coronary artery disease by cardiac cath 2007. Normal stress 2018. Coronary artery calcifications noted on previous Ct.  Chronic lung disease, COPD, bronchiectasis. No acute exacerbation.   Mitral valve prolapse, mild MR.   Troponin above reference , not ACS or myocardial infarction.   Digoxin toxicity.  Digoxin stopped. Repeat level 1.2 today         Adria Murry III, MD  [facility]  07/12/24  09:34 EDT

## 2024-07-12 NOTE — DISCHARGE PLACEMENT REQUEST
"Svetlana Rizzo (89 y.o. Female)       Date of Birth   08/07/1930    Social Security Number       Address   [address] [facility] [address]    Home Phone   [phone]    MRN   [id number]       Nondenominational   [employer]    Marital Status                               Admission Date   7/10/24    Admission Type   Emergency    Admitting Provider   Carlene Mooney MD    Attending Provider   Prince Hubbard MD    Department, Room/Bed   [address], [address]/1       Discharge Date       Discharge Disposition       Discharge Destination                                 Attending Provider: Prince Hubbard MD    Allergies: Amlodipine Besylate, Aspirin, Bactrim [Sulfamethoxazole-trimethoprim], Erythromycin, Levaquin [Levofloxacin], Nitrofurantoin, Ramipril    Isolation: None   Infection: None   Code Status: No CPR    Ht: 162.6 cm (64\")   Wt: 58.8 kg (129 lb 10.1 oz)    Admission Cmt: None   Principal Problem: Syncope and collapse [R55]                   Active Insurance as of 7/10/2024       Primary Coverage       Payor Plan Insurance Group Employer/Plan Group    MEDICARE MEDICARE A & B        Payor Plan Address Payor Plan Phone Number Payor Plan Fax Number Effective Dates    [address] [phone]  8/1/1995 - None Entered    [facility] [address]         Subscriber Name Subscriber Birth Date Member ID       SVETLANA RIZZO 8/7/1930 [id number]               Secondary Coverage       Payor Plan Insurance Group Employer/Plan Group    [facility] SUP [facility] HEALTH CARE OPTIONS PLAN F       Payor Plan Address Payor Plan Phone Number Payor Plan Fax Number Effective Dates    [facility] [phone]  7/1/2014 - None Entered    [address]       [facility] [address]         Subscriber Name Subscriber Birth Date Member ID       SVETLANA RIZZO 8/7/1930 [id number]                     Emergency Contacts        (Rel.) Home Phone Work Phone Mobile Phone    Michelle Zaldivar " (Daughter) [phone] -- [phone]    Handy Rizzo (Son) [phone] -- --    Isaias Preciado (son n [facility]) (Relative) [phone] -- --

## 2024-07-12 NOTE — PLAN OF CARE
Goal Outcome Evaluation:  Plan of Care Reviewed With: patient           Outcome Evaluation: Patient is a 89 y.o female who presented to [facility] following a syncopal episode while at home in the shower where he daughter was able to assist her to a seated position. Patient lives at an [facility] where she reports she usually does not use an AD but has a rwx available as needed. Patient sat up to EOB with SBA. Patient sat at EOB without difficulty while talking to neurology PA. Patient then stood from EOB with CGA. Patient began ambulating around the unit with no AD and CGA. Gait slow and gaurded. After about 100ft patient stopped and reported feeling weak and lightheaded. Chair immediately brought behind patient to sit and patient taken back to room. BP assessed once returned to sitting EOB to be 124/69. Patient reports she felt she just got really weak for a moment. When sitting EOB patient oriented but had difficulty recalling the name of where she lived. Patient requested to stay sitting up at EOB at end of session. Patient eager to return home and reports her daughter can stay with her if needed at d/c. Recommend home with assist and HHPT at d/c. Acute PT will continue to monitor.      Anticipated Discharge Disposition (PT): home with assist, home with home health

## 2024-07-12 NOTE — PLAN OF CARE
Patient was AO4, calm and cooperative. VS were wnl, on room air. No complaints of pain. Patient was drinking well and has passed adequate urine. No episodes of SOB, dizziness, and lightheadedness. Orthostatic BP check done as 125/62mmhg when lying, and 106/55mmhg when standing.    Goal Outcome Evaluation:  Plan of Care Reviewed With: patient        Progress: no change

## 2024-07-13 ENCOUNTER — READMISSION MANAGEMENT (OUTPATIENT)
Dept: CALL CENTER | Facility: HOSPITAL | Age: 89
End: 2024-07-13
Payer: MEDICARE

## 2024-07-13 VITALS
WEIGHT: 129.19 LBS | OXYGEN SATURATION: 97 % | HEIGHT: 64 IN | RESPIRATION RATE: 20 BRPM | SYSTOLIC BLOOD PRESSURE: 126 MMHG | HEART RATE: 64 BPM | DIASTOLIC BLOOD PRESSURE: 55 MMHG | TEMPERATURE: 97.3 F | BODY MASS INDEX: 22.06 KG/M2

## 2024-07-13 LAB
ANION GAP SERPL CALCULATED.3IONS-SCNC: 10 MMOL/L (ref 5–15)
BUN SERPL-MCNC: 16 MG/DL (ref 8–23)
BUN/CREAT SERPL: 18.2 (ref 7–25)
CALCIUM SPEC-SCNC: 9.4 MG/DL (ref 8.2–9.6)
CHLORIDE SERPL-SCNC: 99 MMOL/L (ref 98–107)
CO2 SERPL-SCNC: 30 MMOL/L (ref 22–29)
CREAT SERPL-MCNC: 0.88 MG/DL (ref 0.57–1)
DIGOXIN SERPL-MCNC: 0.7 NG/ML (ref 0.6–1.2)
EGFRCR SERPLBLD CKD-EPI 2021: 61.4 ML/MIN/1.73
GLUCOSE SERPL-MCNC: 128 MG/DL (ref 65–99)
MAGNESIUM SERPL-MCNC: 2.9 MG/DL (ref 1.7–2.3)
POTASSIUM SERPL-SCNC: 3.4 MMOL/L (ref 3.5–5.2)
SODIUM SERPL-SCNC: 139 MMOL/L (ref 136–145)

## 2024-07-13 PROCEDURE — 94761 N-INVAS EAR/PLS OXIMETRY MLT: CPT

## 2024-07-13 PROCEDURE — 99232 SBSQ HOSP IP/OBS MODERATE 35: CPT | Performed by: NURSE PRACTITIONER

## 2024-07-13 PROCEDURE — 80048 BASIC METABOLIC PNL TOTAL CA: CPT | Performed by: INTERNAL MEDICINE

## 2024-07-13 PROCEDURE — 63710000001 PREDNISONE PER 5 MG: Performed by: INTERNAL MEDICINE

## 2024-07-13 PROCEDURE — 94799 UNLISTED PULMONARY SVC/PX: CPT

## 2024-07-13 PROCEDURE — 94664 DEMO&/EVAL PT USE INHALER: CPT

## 2024-07-13 PROCEDURE — 83735 ASSAY OF MAGNESIUM: CPT | Performed by: INTERNAL MEDICINE

## 2024-07-13 PROCEDURE — 80162 ASSAY OF DIGOXIN TOTAL: CPT | Performed by: INTERNAL MEDICINE

## 2024-07-13 RX ORDER — DIGOXIN 125 MCG
125 TABLET ORAL EVERY OTHER DAY
Start: 2024-07-13

## 2024-07-13 RX ORDER — POTASSIUM CHLORIDE 750 MG/1
40 TABLET, FILM COATED, EXTENDED RELEASE ORAL ONCE
Status: DISCONTINUED | OUTPATIENT
Start: 2024-07-13 | End: 2024-07-13 | Stop reason: HOSPADM

## 2024-07-13 RX ORDER — BUMETANIDE 2 MG/1
2 TABLET ORAL DAILY
Start: 2024-07-13

## 2024-07-13 RX ADMIN — CARVEDILOL 3.12 MG: 3.12 TABLET, FILM COATED ORAL at 08:43

## 2024-07-13 RX ADMIN — IPRATROPIUM BROMIDE AND ALBUTEROL SULFATE 3 ML: .5; 3 SOLUTION RESPIRATORY (INHALATION) at 07:03

## 2024-07-13 RX ADMIN — BUDESONIDE AND FORMOTEROL FUMARATE DIHYDRATE 2 PUFF: 160; 4.5 AEROSOL RESPIRATORY (INHALATION) at 07:04

## 2024-07-13 RX ADMIN — AZITHROMYCIN 250 MG: 250 TABLET, FILM COATED ORAL at 08:43

## 2024-07-13 RX ADMIN — CITALOPRAM 20 MG: 20 TABLET, FILM COATED ORAL at 08:43

## 2024-07-13 RX ADMIN — GUAIFENESIN 600 MG: 600 TABLET, EXTENDED RELEASE ORAL at 08:43

## 2024-07-13 RX ADMIN — PREDNISONE 10 MG: 10 TABLET ORAL at 08:43

## 2024-07-13 RX ADMIN — Medication 1 CAPSULE: at 08:43

## 2024-07-13 RX ADMIN — APIXABAN 2.5 MG: 2.5 TABLET, FILM COATED ORAL at 08:43

## 2024-07-13 NOTE — CASE MANAGEMENT/SOCIAL WORK
Case Management Discharge Note      Final Note: Patient discharging back to [facility] in [place] via family transport. Incoming call from [place]  asking for HH orders. HH orders received per MD and Yuni notified. RN updated on family transport to call son when ready. Brianne Martinez mare Bhandari RN CCP    Provided Post Acute Provider List?: Yes  Post Acute Provider List: Home Health  Provided Post Acute Provider Quality & Resource List?: Yes  Post Acute Provider Quality and Resource List: Home Health  Delivered To: Support Person  Support Person: brianne Zaldivar [phone]  Method of Delivery: In person    Selected Continued Care - Admitted Since 7/10/2024       Destination    No services have been selected for the patient.                Durable Medical Equipment    No services have been selected for the patient.                Dialysis/Infusion    No services have been selected for the patient.                Home Medical Care Coordination complete.      Service Provider Selected Services Address Phone Fax Patient Preferred    [facility] [address], [address], [facility] [address] [phone] --              Therapy    No services have been selected for the patient.                Community Resources    No services have been selected for the patient.                Community & DME    No services have been selected for the patient.                    Transportation Services  Private: Car    Final Discharge Disposition Code: 06 - home with home health care

## 2024-07-13 NOTE — NURSING NOTE
Pt previously stated that family will assist in transportation; however, the pt is now stating they need our assistance with transport back to [facility]. RN contacted ER for CM to set up transportation back to facility; they are currently unavailable. RN contacted house CM; spoke with Thony. Will call back with availability and status updates when available.

## 2024-07-13 NOTE — PLAN OF CARE
Problem: Adult Inpatient Plan of Care  Goal: Plan of Care Review  7/13/2024 0539 by Chandrika Valdez RN  Outcome: Ongoing, Progressing  7/13/2024 0539 by Chandrika Valdez RN  Outcome: Ongoing, Progressing     Problem: Adult Inpatient Plan of Care  Goal: Patient-Specific Goal (Individualized)  7/13/2024 0539 by Chandrika Valdez RN  Outcome: Ongoing, Progressing  7/13/2024 0539 by Chandrika Valdez RN  Outcome: Ongoing, Progressing     Problem: Adult Inpatient Plan of Care  Goal: Absence of Hospital-Acquired Illness or Injury  7/13/2024 0539 by Chandrika Valdez RN  Outcome: Ongoing, Progressing  7/13/2024 0539 by Chandrika Valdez RN  Outcome: Ongoing, Progressing  Intervention: Identify and Manage Fall Risk  Recent Flowsheet Documentation  Taken 7/13/2024 0400 by Chandrika Valdez RN  Safety Promotion/Fall Prevention:   activity supervised   fall prevention program maintained   nonskid shoes/slippers when out of bed   safety round/check completed  Taken 7/13/2024 0200 by Chandrika Valdez RN  Safety Promotion/Fall Prevention:   activity supervised   nonskid shoes/slippers when out of bed   room organization consistent   safety round/check completed  Taken 7/13/2024 0000 by Chandrika Valdez RN  Safety Promotion/Fall Prevention: activity supervised  Taken 7/12/2024 2200 by Chandrika Valdez RN  Safety Promotion/Fall Prevention:   safety round/check completed   nonskid shoes/slippers when out of bed  Taken 7/12/2024 2000 by Chandrika Valdez RN  Safety Promotion/Fall Prevention: activity supervised  Taken 7/12/2024 1926 by Chandrika Valdez RN  Safety Promotion/Fall Prevention:   fall prevention program maintained   nonskid shoes/slippers when out of bed   safety round/check completed  Intervention: Prevent Skin Injury  Recent Flowsheet Documentation  Taken 7/13/2024 0400 by Chandrika Valdez RN  Body Position: position changed independently  Taken 7/13/2024 0200 by Chandrika Valdez RN  Body  Position: position changed independently  Taken 7/13/2024 0000 by Chandrika Valdez RN  Body Position: position changed independently  Taken 7/12/2024 2200 by Chandrika Valdez RN  Body Position: position changed independently  Taken 7/12/2024 2000 by Chandrika Valdez RN  Body Position: position changed independently  Taken 7/12/2024 1926 by Chandrika Valdez RN  Body Position: position changed independently  Intervention: Prevent and Manage VTE (Venous Thromboembolism) Risk  Recent Flowsheet Documentation  Taken 7/12/2024 1926 by Chandrika Valdez RN  Activity Management: sitting, edge of bed  VTE Prevention/Management: (elequis)   sequential compression devices off   other (see comments)  Range of Motion: active ROM (range of motion) encouraged  Intervention: Prevent Infection  Recent Flowsheet Documentation  Taken 7/13/2024 0400 by Chandrika Valdez RN  Infection Prevention:   rest/sleep promoted   hand hygiene promoted  Taken 7/13/2024 0200 by Chandrika Valdez RN  Infection Prevention:   rest/sleep promoted   hand hygiene promoted  Taken 7/12/2024 2200 by Chandrika Valdez RN  Infection Prevention:   rest/sleep promoted   hand hygiene promoted  Taken 7/12/2024 2000 by Chandrika Valdez RN  Infection Prevention:   rest/sleep promoted   hand hygiene promoted  Taken 7/12/2024 1926 by Chandrika Valdez RN  Infection Prevention:   hand hygiene promoted   rest/sleep promoted     Problem: Adult Inpatient Plan of Care  Goal: Optimal Comfort and Wellbeing  7/13/2024 0539 by Chandrika Valdez RN  Outcome: Ongoing, Progressing  7/13/2024 0539 by Chandrika Valdez RN  Outcome: Ongoing, Progressing  Intervention: Provide Person-Centered Care  Recent Flowsheet Documentation  Taken 7/12/2024 1926 by Chandrika Valdez RN  Trust Relationship/Rapport:   care explained   thoughts/feelings acknowledged   emotional support provided     Problem: Fall Injury Risk  Goal: Absence of Fall and Fall-Related Injury  7/13/2024  0539 by Chandrika Valdez RN  Outcome: Ongoing, Progressing  7/13/2024 0539 by Chandrika Valdez RN  Outcome: Ongoing, Progressing  Intervention: Identify and Manage Contributors  Recent Flowsheet Documentation  Taken 7/13/2024 0000 by Chandrika Valdez RN  Medication Review/Management: medications reviewed  Taken 7/12/2024 2200 by Chandrika Valdez RN  Medication Review/Management: medications reviewed  Taken 7/12/2024 2000 by Chandrika Valdez RN  Medication Review/Management: medications reviewed  Taken 7/12/2024 1936 by Chandrika Valdez RN  Medication Review/Management: medications reviewed  Taken 7/12/2024 1926 by Chandrika Valdez RN  Medication Review/Management: medications reviewed  Intervention: Promote Injury-Free Environment  Recent Flowsheet Documentation  Taken 7/13/2024 0400 by Chandrika Valdez RN  Safety Promotion/Fall Prevention:   activity supervised   fall prevention program maintained   nonskid shoes/slippers when out of bed   safety round/check completed  Taken 7/13/2024 0200 by Chandrika Valdez RN  Safety Promotion/Fall Prevention:   activity supervised   nonskid shoes/slippers when out of bed   room organization consistent   safety round/check completed  Taken 7/13/2024 0000 by Chandrika Valdez RN  Safety Promotion/Fall Prevention: activity supervised  Taken 7/12/2024 2200 by Chandrika Valdez RN  Safety Promotion/Fall Prevention:   safety round/check completed   nonskid shoes/slippers when out of bed  Taken 7/12/2024 2000 by Chandrika Valdez RN  Safety Promotion/Fall Prevention: activity supervised  Taken 7/12/2024 1926 by Chandrika Valdez RN  Safety Promotion/Fall Prevention:   fall prevention program maintained   nonskid shoes/slippers when out of bed   safety round/check completed   Goal Outcome Evaluation:        No acute overnight events, patient anticipating discharge today. Verbalized understanding of care. Call light at bedside able to make all needs known

## 2024-07-13 NOTE — NURSING NOTE
RN attempted contact with son Handy as instructed by family for transportation back to facility; voicemail left again for call back requested.

## 2024-07-13 NOTE — NURSING NOTE
RN contacted daughter, Michelle, under pt contacts to assist with contacting son or herself for transportation back to pt's assisted living facility. Daughter has agreed to assist if other family member cannot transport.

## 2024-07-13 NOTE — NURSING NOTE
CM contacted pt's daughter; she stated son will provide transportation. RN contacted son, Handy under pt contacts; left voicemail requesting status update.

## 2024-07-13 NOTE — DISCHARGE SUMMARY
NAME: Agnieszka Rizzo ADMIT: 7/10/2024   : 1930  PCP: Matt Rose MD    MRN: [id number] LOS: 1 days   AGE/SEX: 89 y.o. female  ROOM: [facility]/     Date of Admission:  7/10/2024  Date of Discharge:  2024    PCP: Matt Rose MD    CHIEF COMPLAINT  Syncope      DISCHARGE DIAGNOSIS  Active Hospital Problems    Diagnosis  POA    **Syncope and collapse [R55]  Yes    Chronic diastolic CHF (congestive heart failure) [I50.32]  Yes    DNR (do not resuscitate) [Z66]  Yes    Benign essential hypertension [I10]  Yes    Chronic coronary artery disease [I25.10]  Yes      Resolved Hospital Problems   No resolved problems to display.       SECONDARY DIAGNOSES  Past Medical History:   Diagnosis Date    Aspergillus     Asthma     Atrial fibrillation     chronic    Atrial flutter     Bronchiectasis     C. difficile diarrhea 2017    CAD (coronary artery disease)     nonobstructive    Chronic diastolic CHF (congestive heart failure)     Colitis     Cough     Cryoglobulinemia     Dyspnea on exertion     Fall     Hyperlipidemia     Hypertension     Hyponatremia     Hypoxia     Infectious viral hepatitis     AGE 13    Left shoulder pain     Leg swelling     Lesion of lung     Malignant hyperthermia due to anesthesia     Mild tricuspid regurgitation     MR (mitral regurgitation)     mild    MVP (mitral valve prolapse)     Permanent atrial fibrillation     Pneumonia with the fungal infection aspergillosis 2017    Pneumothorax     SOB (shortness of breath)     UTI (urinary tract infection)     Wheeze     mild       CONSULTS   Cardiology  Neurology     HOSPITAL COURSE  Patient is a 89 y.o. female with history of Afib, COPD, D.CHF who presented with syncope.    Etiology was unclear- could have been component of orthostasis as well as vasovagal though bowel incontinence would be rare in either instance. EEG, MRI unrevealing. Per Neurology no AED but consideration if has repeated spells. To follow with  Neurology in clinic.    Cardiology saw and decreased digoxin (level was elevated) as well as decreased bumex to daily from BID. Orthostasis present but improved. Discussed with her orthostatic precautions when getting up. She can be discharged today with outpatient follow up with PCP and Cardiology and Neurology.      DIAGNOSTICS    Collected Updated Procedure Result Status    07/13/2024 0805 07/13/2024 0850 Basic Metabolic Panel [[id number]]    (Abnormal)   Blood    Final result Component Value Units   Glucose 128 High  mg/dL   BUN 16 mg/dL   Creatinine 0.88 mg/dL   Sodium 139 mmol/L   Potassium 3.4 Low  mmol/L   Chloride 99 mmol/L   CO2 30.0 High  mmol/L   Calcium 9.4 mg/dL   BUN/Creatinine Ratio 18.2    Anion Gap 10.0 mmol/L   eGFR 61.4 mL/min/1.73          07/13/2024 0805 07/13/2024 0850 Magnesium [[id number]]   (Abnormal)   Blood    Final result Component Value Units   Magnesium 2.9 High  mg/dL          07/13/2024 0805 07/13/2024 0850 Digoxin Level [[id number]]   Blood    Final result Component Value Units   Digoxin 0.70 ng/mL          07/12/2024 0314 07/12/2024 0416 Digoxin Level [[id number]]   Blood    Final result Component Value Units   Digoxin 1.20 ng/mL          07/11/2024 0547 07/11/2024 0700 Basic Metabolic Panel [[id number]]    (Abnormal)   Blood    Final result Component Value Units   Glucose 109 High  mg/dL   BUN 22 mg/dL   Creatinine 1.06 High  mg/dL   Sodium 141 mmol/L   Potassium 3.2 Low  mmol/L   Chloride 104 mmol/L   CO2 26.8 mmol/L   Calcium 9.2 mg/dL   BUN/Creatinine Ratio 20.8    Anion Gap 10.2 mmol/L   eGFR 49.1 Low  mL/min/1.73         MRI Brain Without Contrast [[id number]] Gerhard as Reviewed   Order Status: Completed Collected: 07/11/24 1646    Updated: 07/11/24 1651   Narrative:     MRI BRAIN WITHOUT CONTRAST     HISTORY: Loss of consciousness.     COMPARISON: CT head 7/10/2024.     FINDINGS: There is no evidence of restricted diffusion to suggest acute  infarction. The axial T2 FLAIR sequence  demonstrates increased signal  intensity involving the periventricular deep white matter cerebral  hemispheres bilaterally consistent with mild to moderate small vessel  ischemic disease. There is no evidence of mass or mass effect on this  noncontrasted MRI examination of the brain.      Impression:     Mild to moderate small vessel ischemic disease is noted.  There is no evidence of an acute infarct.     This report was finalized on 7/11/2024 4:48 PM by Dr. Lon Velasquez M.D  on Workstation: [id number]       CT Head Without Contrast [[id number]] Gerhard as Reviewed   Order Status: Completed Collected: 07/10/24 1445    Updated: 07/10/24 1453   Narrative:     CT HEAD WITHOUT CONTRAST     HISTORY: Syncope.     COMPARISON: CT head 7/12/2021.     FINDINGS: There is mild diffuse atrophy, age-appropriate. Moderate  vascular calcification and mild to moderate small vessel ischemic  disease is noted. There is no evidence of hemorrhage, hydrocephalus,  abnormal extra-axial fluid or of acute infarction. Further evaluation  could be performed with a MRI examination of the brain as indicated.           Radiation dose reduction techniques were utilized, including automated  exposure control and exposure modulation based on body size.        This report was finalized on 7/10/2024 2:49 PM by Dr. Lon Velasquez M.D  on Workstation: [id number]       XR Chest 1 View [[id number]] Gerhard as Reviewed   Order Status: Completed Collected: 07/10/24 1435    Updated: 07/10/24 1441   Narrative:     XR CHEST 1 VW-     INDICATION: Shortness of breath     COMPARISON: CT chest 12/28/2023 and radiographs dating back to 1/22/2016      Impression:     Stable linear atelectasis/scarring along the right minor  fissure. No new focal consolidation. No pleural effusion or  pneumothorax. Stable cardiomegaly. Nondilated distinct central pulmonary  vasculature without radiographic findings to suggest  redistribution/edema. No focal osseous abnormality.  "    PHYSICAL EXAM  Objective:  Vital signs: (most recent): Blood pressure 126/55, pulse 64, temperature 97.3 °F (36.3 °C), temperature source Oral, resp. rate 20, height 162.6 cm (64\"), weight 58.6 kg (129 lb 3 oz), SpO2 97%, not currently breastfeeding.                Alert  nad  No resp distress  No edema  Elderly  Wishes for discharge today     CONDITION ON DISCHARGE  Stable.      DISCHARGE DISPOSITION   Home or Self Care      DISCHARGE MEDICATIONS       Your medication list        CHANGE how you take these medications        Instructions Last Dose Given Next Dose Due   bumetanide 2 MG tablet  Commonly known as: BUMEX  What changed: when to take this      Take 1 tablet by mouth Daily. Indications: Cardiac Failure, Edema       digoxin 125 MCG tablet  Commonly known as: LANOXIN  What changed: when to take this      Take 1 tablet by mouth Every Other Day. Indications: Atrial Fibrillation, Cardiac Failure       FeroSul 325 (65 Fe) MG tablet  Generic drug: ferrous sulfate  What changed:   how much to take  when to take this      TAKE ONE TABLET BY MOUTH DAILY WITH BREAKFAST              CONTINUE taking these medications        Instructions Last Dose Given Next Dose Due   acetaminophen 325 MG tablet  Commonly known as: TYLENOL      Take 2 tablets by mouth Every 4 (Four) Hours As Needed for Moderate Pain. Indications: Pain       albuterol sulfate  (90 Base) MCG/ACT inhaler  Commonly known as: PROVENTIL HFA;VENTOLIN HFA;PROAIR HFA      Inhale 2 puffs Every 6 (Six) Hours As Needed for Wheezing or Shortness of Air.       apixaban 2.5 MG tablet tablet  Commonly known as: ELIQUIS      Take 1 tablet by mouth Every 12 (Twelve) Hours. Indications: Other - full anticoagulation       azithromycin 250 MG tablet  Commonly known as: ZITHROMAX      Take 1 tablet by mouth Daily.       benzonatate 200 MG capsule  Commonly known as: TESSALON      Take 1 capsule by mouth 3 (Three) Times a Day As Needed for Cough. Indications: " Cough       carvedilol 3.125 MG tablet  Commonly known as: COREG      Take 1 tablet by mouth 2 (Two) Times a Day With Meals. Indications: Cardiac Failure, High Blood Pressure Disorder       cholecalciferol 25 MCG (1000 UT) tablet  Commonly known as: VITAMIN D3      Take 1 tablet by mouth Daily. Indications: Vitamin D Deficiency       citalopram 20 MG tablet  Commonly known as: CeleXA      TAKE 1 TABLET BY MOUTH DAILY FOR MAJOR DEPRESSION       fexofenadine 180 MG tablet  Commonly known as: ALLEGRA      Take 1 tablet by mouth Daily. Indications: Hayfever       Florajen Acidophilus capsule      Take 1 tablet by mouth Daily. Indications: Heart failure, status change, with med/diet change       fluticasone-salmeterol 115-21 MCG/ACT inhaler  Commonly known as: ADVAIR HFA      Inhale 2 puffs 2 (Two) Times a Day. Indications: Asthma       guaiFENesin 600 MG 12 hr tablet  Commonly known as: MUCINEX      Take 1 tablet by mouth 2 (Two) Times a Day. Indications: Cough       ipratropium-albuterol 0.5-2.5 mg/3 ml nebulizer  Commonly known as: DUO-NEB      Take 3 mL by nebulization 2 (Two) Times a Day. Indications: Asthma       multivitamin with minerals tablet tablet      Take 1 tablet by mouth 2 (Two) Times a Day. Indications: Vitamin and/or Mineral Deficiency       O2  Commonly known as: OXYGEN      Inhale 2 L/min Every Night. Indications: Oxygen Therapy       potassium chloride 20 MEQ CR tablet  Commonly known as: KLOR-CON M20      Take 1 tablet by mouth Daily.       predniSONE 10 MG tablet  Commonly known as: DELTASONE      Take 1 tablet by mouth Daily.       promethazine 6.25 MG/5ML solution oral solution  Commonly known as: PHENERGAN      Take 5 mL by mouth As Needed (cough). Indications: Nausea and Vomiting       sodium chloride 7 % nebulizer solution nebulizer solution      Take 4 mL by nebulization 2 (Two) Times a Day.                 Where to Get Your Medications        Information about where to get these medications  is not yet available    Ask your nurse or doctor about these medications  bumetanide 2 MG tablet  digoxin 125 MCG tablet          Future Appointments   Date Time Provider Department Center   7/16/2024  9:30 AM [facility] CT 1  [facility]     Additional Instructions for the Follow-ups that You Need to Schedule       Discharge Follow-up with Specialty: PCP in 1-2 weeks, Cardiology APRN in 1-2 weeks, Neurology in 2-6 weeks   As directed      Specialty: PCP in 1-2 weeks, Cardiology APRN in 1-2 weeks, Neurology in 2-6 weeks               Follow-up Information       Matt Rose MD .    Specialty: Family Medicine  Contact information:  [id number] [id number] [phone]                             TEST  RESULTS PENDING AT DISCHARGE         Prince Hubbard MD  [facility]  07/13/24  09:22 EDT      Time: greater than 32 minutes on discharge  It was a pleasure taking care of this patient while in the hospital.

## 2024-07-13 NOTE — OUTREACH NOTE
Prep Survey      Flowsheet Row Responses   [facility] facility patient discharged from? [place]   Is LACE score < 7 ? No   Eligibility [facility]   Date of Admission 07/10/24   Date of Discharge 07/13/24   Discharge Disposition Home or Self Care   Discharge diagnosis Syncope and collapse   Does the patient have one of the following disease processes/diagnoses(primary or secondary)? Other   Does the patient have Home health ordered? Yes   What is the Home health agency?  [facility]   Prep survey completed? Yes            KARLA RUTH - Registered Nurse

## 2024-07-13 NOTE — PROGRESS NOTES
"[facility]    Patient Name: Agnieszka Rizzo  :1930  89 y.o.  LOS: 1  Encounter Provider: PJ Shepherd      Patient Care Team:  Matt Rose MD as PCP - General (Family Medicine)  Marcie Robbins MD as Consulting Physician (Cardiology)  Nilesh Danielle MD as Consulting Physician (Urology)  Lina Morris RPH as Pharmacist  Lev Mckeon PharmD as Pharmacist (Pharmacy)  Rogelio Tucker MD as Consulting Physician (Pulmonary Disease)    Chief Complaint:  syncope    Interval History: Sitting at side of bed. Note plans for discharge today.       Objective   Vital Signs  Temp:  [97.3 °F (36.3 °C)-99 °F (37.2 °C)] 97.3 °F (36.3 °C)  Heart Rate:  [59-74] 64  Resp:  [16-20] 20  BP: (124-138)/(61-79) 135/67  No intake or output data in the 24 hours ending 24 0753  Flowsheet Rows      Flowsheet Row First Filed Value   Admission Height 162.6 cm (64\") Documented at 07/10/2024 1305   Admission Weight 59 kg (130 lb) Documented at 07/10/2024 1305              Vitals reviewed.   Constitutional:       General: Not in acute distress.     Appearance: Well-developed. Not diaphoretic.   HENT:      Head: Normocephalic.   Pulmonary:      Effort: Pulmonary effort is normal. No respiratory distress.      Breath sounds: Normal breath sounds. No wheezing. No rhonchi. No rales.   Cardiovascular:      Normal rate. Irregularly irregular rhythm.   Pulses:     Radial: 2+ bilaterally.  Edema:     Peripheral edema absent.   Skin:     General: Skin is warm and dry. There is no cyanosis.      Findings: No rash.   Neurological:      Mental Status: Alert and oriented to person, place, and time.   Psychiatric:         Behavior: Behavior normal.         Thought Content: Thought content normal.         Judgment: Judgment normal.           Pertinent Test Results:  Results from last 7 days   Lab Units 24  0547 07/10/24  1304   SODIUM mmol/L 141 139   POTASSIUM mmol/L 3.2* 3.9   CHLORIDE mmol/L 104 96* " "  CO2 mmol/L 26.8 27.6   BUN mg/dL 22 16   CREATININE mg/dL 1.06* 1.30*   GLUCOSE mg/dL 109* 179*   CALCIUM mg/dL 9.2 9.5   AST (SGOT) U/L  --  25   ALT (SGPT) U/L  --  25     Results from last 7 days   Lab Units 07/10/24  1446 07/10/24  1304   HSTROP T ng/L 42* 52*     Results from last 7 days   Lab Units 07/11/24  0547 07/10/24  1304   WBC 10*3/mm3 10.15 18.61*   HEMOGLOBIN g/dL 11.0* 12.7   HEMATOCRIT % 34.4 40.7   PLATELETS 10*3/mm3 328 257     Results from last 7 days   Lab Units 07/10/24  1304   INR  1.19*     Results from last 7 days   Lab Units 07/10/24  1304   MAGNESIUM mg/dL 2.4*           Invalid input(s): \"LDLCALC\"      Results from last 7 days   Lab Units 07/10/24  1304   TSH uIU/mL 4.790*           Medication Review:   apixaban, 2.5 mg, Oral, Q12H  azithromycin, 250 mg, Oral, Daily  budesonide-formoterol, 2 puff, Inhalation, BID - RT  carvedilol, 3.125 mg, Oral, BID With Meals  citalopram, 20 mg, Oral, Daily  guaiFENesin, 600 mg, Oral, BID  ipratropium-albuterol, 3 mL, Nebulization, BID  lactobacillus acidophilus, 1 capsule, Oral, Daily  predniSONE, 10 mg, Oral, Daily              Assessment & Plan     Active Hospital Problems    Diagnosis  POA    **Syncope and collapse [R55]  Yes    Chronic diastolic CHF (congestive heart failure) [I50.32]  Yes    DNR (do not resuscitate) [Z66]  Yes    Benign essential hypertension [I10]  Yes    Chronic coronary artery disease [I25.10]  Yes      Resolved Hospital Problems   No resolved problems to display.        Syncope and collapse - suspect a combination of orthostatic hypotension and vasovagal based on history. Brittle volume status and history of recurrent hospitalizations for volume overload. Daughter noted HR >200 based on pulse oximeter at time of event. Questionable accuracy.   Neurology also notes concern for seizure. Confusion after episode concerning for postictal/seizure. Orthostatics yesterday stable.   Permanent atrial fibrillation  - HR controlled. " Labile INR on warfarin. Currently on apixaban and tolerating. No falls. No s/s bleeding.   Chronic diastolic heart failure , euvolemic currently.   Non obstructive coronary artery disease by cardiac cath 2007. Normal stress 2018. Coronary artery calcifications noted on previous Ct.  Chronic lung disease, COPD, bronchiectasis. No acute exacerbation.   Mitral valve prolapse, mild MR.   Troponin above reference , not ACS or myocardial infarction.   Digoxin toxicity. Digoxin stopped. Repeat level 1.2 yesterday.    No objection to discharge from a cardiac standpoint. Will decrease digoxin to 0.125 mg every other day and bumetanide to 2 mg daily from BID. She will need follow up in office in 1 week.           PJ Shepherd  [facility]   [facility]  [address] [place] [address]  Office: [phone]    07/13/24  07:53 EDT

## 2024-07-15 ENCOUNTER — TELEPHONE (OUTPATIENT)
Dept: INTERNAL MEDICINE | Facility: CLINIC | Age: 89
End: 2024-07-15

## 2024-07-15 ENCOUNTER — TRANSITIONAL CARE MANAGEMENT TELEPHONE ENCOUNTER (OUTPATIENT)
Dept: CALL CENTER | Facility: HOSPITAL | Age: 89
End: 2024-07-15
Payer: MEDICARE

## 2024-07-15 NOTE — OUTREACH NOTE
Call Center TCM Note      Flowsheet Row Responses   [facility] patient discharged from? [facility]   Does the patient have one of the following disease processes/diagnoses(primary or secondary)? Other   TCM attempt successful? Yes   Call start time 1315   Call end time 1322   General alerts for this patient [facility]   Discharge diagnosis Syncope and collapse   Meds reviewed with patient/caregiver? Yes   Is the patient having any side effects they believe may be caused by any medication additions or changes? No   Does the patient have all medications ordered at discharge? N/A   Is the patient taking all medications as directed (includes completed medication regime)? Yes   Does the patient have an appointment with their PCP within 7-14 days of discharge? No   Nursing Interventions --  [Pt sees a APRN at the facility]   What is the Home health agency?  [facility]    Has home health visited the patient within 72 hours of discharge? Yes   DME comments pt uses a walker for balance, wears 2LO2 at night   Psychosocial issues? No  [\]   Comments Dtr reports pt's vitals all have been good   What is the patient's perception of their health status since discharge? Same   TCM call completed? Yes   Call end time 1322   Would this patient benefit from a Referral to Amb Social Work? No   Is the patient interested in additional calls from an ambulatory ? No            Shikha Gonzalez RN    7/15/2024, 13:23 EDT

## 2024-07-22 NOTE — PROGRESS NOTES
History & Physical       Patient: Agnieszka Rizzo    YOB: 1930    Medical Record Number: [id number]    Chief Complaints: Left shoulder injury    History of Present Illness: 86 y.o. female presents for evaluation of her left shoulder.  She fell in her bathroom on February 7, 2017, landing awkwardly on her left side.  She noticed immediate shoulder pain.  She was seen at [facility], [place], operated by [facility] emergency room and placed in a sling.  She was subsequently referred here.  It has now been approximately 1 month since her injury.  She tells me the shoulder is feeling much better.  She describes her pain as moderate, constant, and aching.  The pain is worse with movement.  She had some bruising initially with the injury but this is now much better.  Denies any other injuries or complaints.    Allergies:   Allergies   Allergen Reactions   • Amlodipine Besylate Swelling   • Aspirin      Caused bleeding ulcers   • Bactrim [Sulfamethoxazole-Trimethoprim] Nausea And Vomiting   • Erythromycin    • Levaquin [Levofloxacin]    • Macrobid [Nitrofurantoin] Nausea Only   • Penicillins      Has tolerated ceftriaxone in the past   • Ramipril Other (See Comments)     Cough       Home Medications:    Current Outpatient Prescriptions:   •  acetylcysteine (MUCOMYST) 20 % nebulizer solution, Take 4 mL by nebulization 4 (Four) Times a Day., Disp: 500 mL, Rfl: 1  •  acyclovir (ZOVIRAX) 400 MG tablet, Take 400 mg by mouth 3 (Three) Times a Day. Take no more than 5 doses a day., Disp: , Rfl:   •  ADVAIR -21 MCG/ACT inhaler, Inhale 2 puffs 2 (Two) Times a Day., Disp: 1 inhaler, Rfl: 0  •  atorvastatin (LIPITOR) 40 MG tablet, Take 1 tablet by mouth Daily., Disp: 90 tablet, Rfl: 3  •  B Complex-C (SUPER B COMPLEX PO), Take  by mouth., Disp: , Rfl:   •  cholecalciferol (VITAMIN D3) 1000 UNITS tablet, Take 1,000 Units by mouth Daily., Disp: , Rfl:   •  dabigatran etexilate (PRADAXA) 150 MG capsu, Take 1 capsule by mouth Every 12 (Twelve) Hours.,  Disp: 180 capsule, Rfl: 3  •  diltiaZEM CD (CARDIZEM CD) 240 MG 24 hr capsule, Take 1 capsule by mouth Daily., Disp: 90 capsule, Rfl: 3  •  diltiaZEM CD (CARDIZEM CD) 240 MG 24 hr capsule, Take 1 capsule by mouth Daily., Disp: 14 capsule, Rfl: 0  •  ezetimibe (ZETIA) 10 MG tablet, Take 1 tablet by mouth Daily., Disp: 90 tablet, Rfl: 3  •  furosemide (LASIX) 20 MG tablet, Take 20 mg by mouth 2 (Two) Times a Day., Disp: , Rfl:   •  Glucosamine-Chondroit-Vit C-Mn (GLUCOSAMINE-CHONDROITIN) tablet, Take 1 tablet by mouth Daily., Disp: , Rfl:   •  ipratropium-albuterol (DUO-NEB) 0.5-2.5 mg/mL nebulizer, Take 3 mL by nebulization Every 4 (Four) Hours As Needed for wheezing or shortness of air for up to 30 days., Disp: 360 mL, Rfl: 5  •  loratadine (ALLERGY RELIEF) 10 MG tablet, Take 10 mg by mouth Daily., Disp: , Rfl:   •  losartan (COZAAR) 50 MG tablet, 50 mg 2 (Two) Times a Day., Disp: , Rfl:   •  Multiple Vitamin (MULTIVITAMIN) tablet, Take 1 tablet by mouth Daily., Disp: , Rfl:   •  pantoprazole (PROTONIX) 40 MG EC tablet, Take 1 tablet by mouth Daily., Disp: 30 tablet, Rfl: 1  •  VERAMYST 27.5 MCG/SPRAY nasal spray, 1 spray into each nostril Daily., Disp: , Rfl:   •  voriconazole (VFEND) 200 MG tablet, Take 200 mg by mouth 2 (Two) Times a Day., Disp: , Rfl:       Past Medical History   Diagnosis Date   • A-fib    • Abdominal distension    • Acute hypoxemic respiratory failure    • Aspergillosis    • Asthma    • Atrial fibrillation    • Atrial flutter    • Bigeminy    • Bronchiectasis    • CAD (coronary artery disease)    • Chest tightness    • Colitis    • Cough    • Cryoglobulinemia    • Diastolic heart failure    • Dyspnea    • Fall    • History of pneumonia      MAY 2016   • Hyperlipidemia    • Hypertension    • Hyponatremia    • Hypoxia    • Infectious viral hepatitis      AGE 13   • Leg swelling    • Lesion of lung    • MR (mitral regurgitation)      mild   • MVP (mitral valve prolapse)    • PAF (paroxysmal  atrial fibrillation)    • Permanent atrial fibrillation    • Pneumonia    • SOB (shortness of breath)    • UTI (urinary tract infection)    • Weakness    • Wheeze      mild          Past Surgical History   Procedure Laterality Date   • Hysterectomy     • D&c with suction     • Cataract extraction extracapsular w/ intraocular lens implantation     • Knee arthroscopy Left    • Colonoscopy       2013   • Bronchoscopy N/A 11/12/2016     Procedure: BRONCHOSCOPY WITH FLUORO, BRUSHINGS, BAL, AND BIOPSIES;  Surgeon: Rogelio Tucker MD;  Location: [facility] ENDOSCOPY;  Service:           Social History     Occupational History   • Not on file.     Social History Main Topics   • Smoking status: Never Smoker   • Smokeless tobacco: Never Used   • Alcohol use No   • Drug use: No   • Sexual activity: Yes     Partners: Male      Social History     Social History Narrative          Family History   Problem Relation Age of Onset   • Hypertension Mother    • Hypertension Father    • Stroke Father    • Cancer Son      Review of Systems:      Constitutional: Denies fever, shaking or chills   Eyes: Denies change in visual acuity   HEENT: Denies nasal congestion or sore throat   Respiratory: Denies cough or shortness of breath   Cardiovascular: Denies chest pain or edema  Endocrine: Denies tremors, palpitations, intolerance of heat or cold, polyuria, polydipsia.  GI: Denies abdominal pain, nausea, vomiting, bloody stools or diarrhea  : Denies frequency, urgency, incontinence, retention, or nocturia.  Musculoskeletal: Denies numbness tingling or loss of motor function except as above  Integument: Denies rash, lesion or ulceration   Neurologic: Denies headache or focal weakness, deficits  Heme: Denies epistaxis, spontaneous or excessive bleeding, epistaxis, hematuria, melena, fatigue, enlarged or tender lymph nodes.      All other pertinent positives and negatives as noted above in HPI.      Physical Exam: 86 y.o. female  Vitals:    03/06/17  "1254   Temp: 98.7 °F (37.1 °C)   Weight: 129 lb (58.5 kg)   Height: 63\" (160 cm)     General:  Patient is awake and alert.  Appears in no acute distress or discomfort.    Psych:  Affect and demeanor are appropriate.    Eyes:  Conjunctiva and sclera appear grossly normal.  Eyes track well and EOM seem to be intact.    Dentition:  No gross abnormalities noted.    Ears:  No gross abnormalities.  Hearing adequate for the exam.    Cardiovascular:  Regular rate and rhythm.    Lungs:  Good chest expansion.  Breathing unlabored.    Lymph:  No palpable masses or adenopathy in the affected extremity    Left upper extremity:  Sling was in place and removed.  Skin appears benign.  No gross malalignment, lacerations or abrasions.  Focal tenderness noted over midshaft of the clavicle with a palpable area of early callus.  No palpable masses or adenopathy.  Compartments soft.  Painful, limited ROM of the shoulder.  No tenderness noted over the remainder of the shoulder girdle.  Good strength in the elbow with flexion and extension, wrist flexion and extension, and .  Intact sensation throughout the arm and hand.  Brisk cap refill.        Diagnostic Tests:  Lab Results   Component Value Date    GLUCOSE 109 (H) 02/12/2017    CALCIUM 9.0 02/12/2017     (L) 02/12/2017    K 4.2 02/12/2017    CO2 25.2 02/12/2017    CL 93 (L) 02/12/2017    BUN 5 (L) 02/12/2017    CREATININE 0.56 (L) 02/12/2017    EGFRIFNONA 103 02/12/2017    BCR 8.9 02/12/2017    ANIONGAP 11.8 02/12/2017     Lab Results   Component Value Date    WBC 6.06 02/11/2017    HGB 8.6 (L) 02/11/2017    HCT 27.8 (L) 02/11/2017    MCV 98.6 (H) 02/11/2017     02/11/2017     Lab Results   Component Value Date    INR 1.1 01/22/2016    PROTIME 13.7 01/22/2016       Imaging:  Outside x-rays and repeat AP and orthogonal views of the left clavicle are ordered and reviewed today.  The initial films show a minimally displaced midshaft clavicle fracture.  The repeat films " today show interval callus formation and early healing.    Assessment:  Left clavicle fracture    Plan: This injury is certainly amenable to closed treatment.  She already demonstrates evidence for early healing.  The patient voiced understanding of the risks, benefits, and alternative forms of treatment that were discussed and the patient consents to proceed with closed treatment.  I'm going to allow her to come out of the sling at this point and start working on progressive range of motion.  We talked about appropriate activity modifications and restrictions.  I'm going to refer her to physical therapy.  I want to see her back in 1 month.    Date: 3/6/2017    Aubrey Steward MD    CC to Gabe Horne MD       Shingles rash

## 2024-07-23 ENCOUNTER — READMISSION MANAGEMENT (OUTPATIENT)
Dept: CALL CENTER | Facility: HOSPITAL | Age: 89
End: 2024-07-23
Payer: MEDICARE

## 2024-07-23 NOTE — OUTREACH NOTE
Medical Week 2 Survey      Flowsheet Row Responses   [facility] patient discharged from? [place]   Does the patient have one of the following disease processes/diagnoses(primary or secondary)? Other   Week 2 attempt successful? Yes   Call start time 1327   General alerts for this patient Pt lives at [facility] [place]   Discharge diagnosis Syncope and collapse   Call end time 1330   Person spoke with today (if not patient) and relationship Michelle Zaldivar--Daughter   Meds reviewed with patient/caregiver? Yes   Is the patient having any side effects they believe may be caused by any medication additions or changes? No   Does the patient have all medications ordered at discharge? N/A   Is the patient taking all medications as directed (includes completed medication regime)? Yes   Medication comments Daughter states patient has made changes to Bumex and Lanoxin as directed upon discharge.   Comments regarding appointments Cardiology hospital f/u appt on 7/30/24 at 1:00 PM with PJ Dumont.   Does the patient have a primary care provider?  Yes   Comments regarding PCP PCP--Dr. Matt Rose   Has the patient kept scheduled appointments due by today? Yes   Comments Daughter states Pallative Care comes to [facility] and sees patient once a month.   What is the Home health agency?  [facility]    Has home health visited the patient within 72 hours of discharge? Yes   Home health comments HH visiting once a week.   DME comments pt uses a walker for balance, wears 2LO2 at night   Psychosocial issues? No   Comments Daughter states patient is doing better. No further syncopal events.  has ordered machine to check vital signs and monitor daily.   Did the patient receive a copy of their discharge instructions? Yes   Nursing interventions Reviewed instructions with patient  [with daughter, Michelle Zaldivar]   What is the patient's perception of their health status since discharge? Improving   Is the patient/caregiver  able to teach back signs and symptoms related to disease process for when to call PCP? Yes   Is the patient/caregiver able to teach back signs and symptoms related to disease process for when to call 911? Yes   Is the patient/caregiver able to teach back the hierarchy of who to call/visit for symptoms/problems? PCP, Specialist, Home health nurse, Urgent Care, ED, 911 Yes   If the patient is a current smoker, are they able to teach back resources for cessation? Not a smoker   Week 2 Call Completed? Yes   Graduated Yes   Is the patient interested in additional calls from an ambulatory ? No   Would this patient benefit from a Referral to Amb Social Work? No   Graduated/Revoked comments Daughter denies any needs or concerns. Appreciative of the call.   Call end time 1330            Cammy HUBBARD - Registered Nurse

## 2024-07-29 RX ORDER — POTASSIUM CHLORIDE 1500 MG/1
20 TABLET, EXTENDED RELEASE ORAL DAILY
Qty: 90 TABLET | Refills: 3 | OUTPATIENT
Start: 2024-07-29

## 2024-07-30 ENCOUNTER — OFFICE VISIT (OUTPATIENT)
Dept: CARDIOLOGY | Facility: CLINIC | Age: 89
End: 2024-07-30
Payer: MEDICARE

## 2024-07-30 VITALS
HEIGHT: 64 IN | BODY MASS INDEX: 22.02 KG/M2 | SYSTOLIC BLOOD PRESSURE: 122 MMHG | OXYGEN SATURATION: 94 % | DIASTOLIC BLOOD PRESSURE: 68 MMHG | WEIGHT: 129 LBS | HEART RATE: 72 BPM

## 2024-07-30 DIAGNOSIS — I27.20 PULMONARY HYPERTENSION: ICD-10-CM

## 2024-07-30 DIAGNOSIS — I95.1 ORTHOSTASIS: ICD-10-CM

## 2024-07-30 DIAGNOSIS — I36.1 NONRHEUMATIC TRICUSPID VALVE REGURGITATION: ICD-10-CM

## 2024-07-30 DIAGNOSIS — I31.39 PERICARDIAL EFFUSION: ICD-10-CM

## 2024-07-30 DIAGNOSIS — I34.0 NONRHEUMATIC MITRAL VALVE REGURGITATION: ICD-10-CM

## 2024-07-30 DIAGNOSIS — R55 SYNCOPE, UNSPECIFIED SYNCOPE TYPE: Primary | ICD-10-CM

## 2024-07-30 DIAGNOSIS — R94.31 ABNORMAL EKG: ICD-10-CM

## 2024-07-30 DIAGNOSIS — I50.32 CHRONIC DIASTOLIC CHF (CONGESTIVE HEART FAILURE): ICD-10-CM

## 2024-07-30 DIAGNOSIS — I25.10 NONOBSTRUCTIVE ATHEROSCLEROSIS OF CORONARY ARTERY: ICD-10-CM

## 2024-07-30 DIAGNOSIS — I48.21 PERMANENT ATRIAL FIBRILLATION: ICD-10-CM

## 2024-07-30 PROBLEM — J18.9 SEPSIS DUE TO PNEUMONIA: Status: RESOLVED | Noted: 2023-04-03 | Resolved: 2024-07-30

## 2024-07-30 PROBLEM — T14.8XXA MULTIPLE SKIN TEARS: Status: RESOLVED | Noted: 2021-07-16 | Resolved: 2024-07-30

## 2024-07-30 PROBLEM — I50.33 ACUTE ON CHRONIC DIASTOLIC HEART FAILURE: Status: RESOLVED | Noted: 2017-03-12 | Resolved: 2024-07-30

## 2024-07-30 PROBLEM — J81.0 ACUTE PULMONARY EDEMA: Status: RESOLVED | Noted: 2023-08-04 | Resolved: 2024-07-30

## 2024-07-30 PROBLEM — I50.9 CHF (CONGESTIVE HEART FAILURE): Status: RESOLVED | Noted: 2023-11-26 | Resolved: 2024-07-30

## 2024-07-30 PROBLEM — J18.9 PNEUMONIA OF RIGHT LUNG DUE TO INFECTIOUS ORGANISM, UNSPECIFIED PART OF LUNG: Status: RESOLVED | Noted: 2022-07-09 | Resolved: 2024-07-30

## 2024-07-30 PROBLEM — R42 DIZZINESS: Status: RESOLVED | Noted: 2022-09-09 | Resolved: 2024-07-30

## 2024-07-30 PROBLEM — E87.8 ELECTROLYTE IMBALANCE: Status: RESOLVED | Noted: 2023-08-16 | Resolved: 2024-07-30

## 2024-07-30 PROBLEM — J45.901 MODERATE ASTHMA WITH ACUTE EXACERBATION: Status: RESOLVED | Noted: 2019-12-02 | Resolved: 2024-07-30

## 2024-07-30 PROBLEM — Z20.5 EXPOSURE TO HEPATITIS A: Status: RESOLVED | Noted: 2018-04-20 | Resolved: 2024-07-30

## 2024-07-30 PROBLEM — J47.9 BRONCHIECTASIS: Status: RESOLVED | Noted: 2023-02-20 | Resolved: 2024-07-30

## 2024-07-30 PROBLEM — A41.9 SEPSIS DUE TO PNEUMONIA: Status: RESOLVED | Noted: 2023-04-03 | Resolved: 2024-07-30

## 2024-07-30 PROBLEM — U07.1 CLINICAL DIAGNOSIS OF COVID-19: Status: RESOLVED | Noted: 2022-01-04 | Resolved: 2024-07-30

## 2024-07-30 PROBLEM — J18.9 PNEUMONIA OF BOTH LUNGS DUE TO INFECTIOUS ORGANISM: Status: RESOLVED | Noted: 2017-05-31 | Resolved: 2024-07-30

## 2024-07-30 PROBLEM — J20.9 BRONCHITIS, ACUTE, WITH BRONCHOSPASM: Status: RESOLVED | Noted: 2019-12-16 | Resolved: 2024-07-30

## 2024-07-30 PROBLEM — R89.9 ACID-FAST BACTERIA PRESENT: Status: RESOLVED | Noted: 2017-01-09 | Resolved: 2024-07-30

## 2024-07-30 PROBLEM — R10.9 ABDOMINAL PAIN: Status: RESOLVED | Noted: 2019-09-23 | Resolved: 2024-07-30

## 2024-07-30 PROBLEM — S41.112A LACERATION OF LEFT UPPER EXTREMITY: Status: RESOLVED | Noted: 2021-07-16 | Resolved: 2024-07-30

## 2024-07-30 NOTE — PROGRESS NOTES
Date of Office Visit: 2024  Encounter Provider: PJ Potter  Place of Service: [facility] CARDIOLOGY  Patient Name: Agnieszka Rizzo  :1930  Primary Cardiologist: Dr. Marcie Robbins     Chief Complaint   Patient presents with    Hospital Follow Up Visit    Loss of Consciousness   :     HPI: Agnieszka Rizzo is a 89 y.o. female who presents today for hospital follow-up visit.  She is a new patient to me and I reviewed her medical records.    She has known hypertension, hyperlipidemia, acute on chronic diastolic heart failure, and permanent atrial fibrillation.    In 2023, echocardiogram showed EF 56%, biatrial severe dilation, trace aortic regurgitation, aortic valve leaflets mildly calcific, mild to moderate mitral regurgitation, mild mitral annular calcification, moderate tricuspid regurgitation, moderate pulmonary hypertension (RVSP 49 mmHg), and small circumferential pericardial effusion without tamponade.    Her daughter, Michelle explains that she was helping her with a shower.  She had just gotten out of the shower and was sitting on the toilet.  She started to feel woozy, became cold and clammy, her face got red, she lost consciousness, became nauseous/dry heaving, and had a major bowel movement.  She came to, her daughter was able to assist her back into the shower chair and she had a second episode where she lost consciousness and had another bowel movement.  They called 911.    I reviewed the hospital records.  The discharge summary is that the etiology was unclear.  Orthostasis was present.  Over, with orthostasis and vasovagal syncope, rarely which you have bowel incontinence.  Neurology evaluated and said EEG and MRI were on revealing.  Her digoxin was decreased and bumetanide decreased from twice daily to daily.    She presents with her daughter accompanying her.  She has had no further episodes of syncope.  She has chronic shortness of breath  from bronchiectasis and follows with Dr. Tucker.  She denies chest pain, PND, orthopnea, edema, palpitations, dizziness, or bleeding.  She has a chronically abnormal EKG.  Orthostatic blood pressures were negative today.      Past Medical History:   Diagnosis Date    Aspergillus     Asthma     Atrial fibrillation     chronic    Atrial flutter     Bronchiectasis     C. difficile diarrhea 03/11/2017    CAD (coronary artery disease)     nonobstructive    Chronic diastolic CHF (congestive heart failure)     Clinical diagnosis of COVID-19 01/04/2022    Colitis     Cough     Cryoglobulinemia     Dyspnea on exertion     Exposure to hepatitis A 04/20/2018    Fall     Hyperlipidemia     Hypertension     Hyponatremia     Hypoxia     Infectious viral hepatitis     AGE 13    Left shoulder pain     Leg swelling     Lesion of lung     Malignant hyperthermia due to anesthesia     Mild tricuspid regurgitation     MR (mitral regurgitation)     mild    MVP (mitral valve prolapse)     Permanent atrial fibrillation     Pneumonia of both lungs due to infectious organism 05/31/2017    Followed BY ID for MAC      Pneumonia with the fungal infection aspergillosis 01/06/2017    Pneumothorax     SOB (shortness of breath)     Syncope 07/2024    UTI (urinary tract infection)     Wheeze     mild       Past Surgical History:   Procedure Laterality Date    BRONCHOSCOPY N/A 11/12/2016    Procedure: BRONCHOSCOPY WITH FLUORO, BRUSHINGS, BAL, AND BIOPSIES;  Surgeon: Rogelio Tucker MD;  Location: [facility] ENDOSCOPY;  Service:     BRONCHOSCOPY Bilateral 06/03/2017    Procedure: BRONCHOSCOPY with BAL ;  Surgeon: Sung King MD;  Location: [facility] ENDOSCOPY;  Service:     BRONCHOSCOPY N/A 12/17/2019    Procedure: BRONCHOSCOPY WITH WASHINGS;  Surgeon: Rogelio Tucker MD;  Location: [facility] ENDOSCOPY;  Service: Pulmonary    BRONCHOSCOPY N/A 07/15/2022    Procedure: BRONCHOSCOPY;  Surgeon: Rogelio Tucker MD;  Location: [facility] ENDOSCOPY;  Service: Pulmonary;   Laterality: N/A;  Pre: Pneumonia  Post: Pneumonia    BRONCHOSCOPY N/A 10/2/2023    Procedure: BRONCHOSCOPY WITH BRONCHIAL AVEOLAR LAVAGE;  Surgeon: Rogelio Tucker MD;  Location: [facility] ENDOSCOPY;  Service: Pulmonary;  Laterality: N/A;  PRE:BRONCHIECTASIS /   POST: SAME    CATARACT EXTRACTION EXTRACAPSULAR W/ INTRAOCULAR LENS IMPLANTATION      COLONOSCOPY      2013    D & C WITH SUCTION      HYSTERECTOMY      KNEE ARTHROSCOPY Left     Partial       Social History     Socioeconomic History    Marital status:    Tobacco Use    Smoking status: Never    Smokeless tobacco: Never    Tobacco comments:     caffiene daily   Vaping Use    Vaping status: Never Used   Substance and Sexual Activity    Alcohol use: Not Currently     Alcohol/week: 2.0 standard drinks of alcohol     Types: 1 Glasses of wine, 1 Shots of liquor per week    Drug use: No    Sexual activity: Defer     Partners: Male       Family History   Problem Relation Age of Onset    Hypertension Mother     Stroke Mother     Heart disease Father     Hypertension Father     Cancer Brother     Cancer Son        The following portion of the patient's history were reviewed and updated as appropriate: past medical history, past surgical history, past social history, past family history, allergies, current medications, and problem list.    Review of Systems   Constitutional: Negative.   Cardiovascular:  Positive for dyspnea on exertion.   Respiratory:  Positive for shortness of breath.    Hematologic/Lymphatic: Negative.    Neurological: Negative.        Allergies   Allergen Reactions    Amlodipine Besylate Swelling    Aspirin GI Intolerance     Caused bleeding ulcers    Bactrim [Sulfamethoxazole-Trimethoprim] Nausea And Vomiting    Erythromycin Unknown (See Comments)     Patient does not recall type of reaction.    Levaquin [Levofloxacin] Unknown (See Comments)     Nausea      Nitrofurantoin Nausea Only and Nausea And Vomiting    Ramipril Other (See Comments)      Cough         Current Outpatient Medications:     acetaminophen (TYLENOL) 325 MG tablet, Take 2 tablets by mouth Every 4 (Four) Hours As Needed for Moderate Pain. Indications: Pain, Disp: , Rfl:     albuterol sulfate  (90 Base) MCG/ACT inhaler, Inhale 2 puffs Every 6 (Six) Hours As Needed for Wheezing or Shortness of Air., Disp: 8 g, Rfl: 11    apixaban (ELIQUIS) 2.5 MG tablet tablet, Take 1 tablet by mouth Every 12 (Twelve) Hours. Indications: Other - full anticoagulation, Disp: 180 tablet, Rfl: 3    azithromycin (ZITHROMAX) 250 MG tablet, Take 1 tablet by mouth Daily., Disp: , Rfl:     benzonatate (TESSALON) 200 MG capsule, Take 1 capsule by mouth 3 (Three) Times a Day As Needed for Cough. Indications: Cough, Disp: , Rfl:     bumetanide (BUMEX) 2 MG tablet, Take 1 tablet by mouth Daily. Indications: Cardiac Failure, Edema, Disp: , Rfl:     carvedilol (COREG) 3.125 MG tablet, Take 1 tablet by mouth 2 (Two) Times a Day With Meals. Indications: Cardiac Failure, High Blood Pressure Disorder, Disp: 180 tablet, Rfl: 3    citalopram (CeleXA) 20 MG tablet, TAKE 1 TABLET BY MOUTH DAILY FOR MAJOR DEPRESSION, Disp: 30 tablet, Rfl: 1    digoxin (LANOXIN) 125 MCG tablet, Take 1 tablet by mouth Every Other Day. Indications: Atrial Fibrillation, Cardiac Failure, Disp: , Rfl:     FeroSul 325 (65 Fe) MG tablet, TAKE ONE TABLET BY MOUTH DAILY WITH BREAKFAST (Patient taking differently: Take 1 tablet by mouth Daily.), Disp: 90 tablet, Rfl: 0    fexofenadine (ALLEGRA) 180 MG tablet, Take 1 tablet by mouth Daily. Indications: Hayfever, Disp: , Rfl:     fluticasone-salmeterol (ADVAIR HFA) 115-21 MCG/ACT inhaler, Inhale 2 puffs 2 (Two) Times a Day. Indications: Asthma, Disp: , Rfl:     guaiFENesin (MUCINEX) 600 MG 12 hr tablet, Take 1 tablet by mouth 2 (Two) Times a Day. Indications: Cough, Disp: , Rfl:     ipratropium-albuterol (DUO-NEB) 0.5-2.5 mg/3 ml nebulizer, Take 3 mL by nebulization 2 (Two) Times a Day. Indications:  "Asthma, Disp: , Rfl:     Lactobacillus (FLORAJEN ACIDOPHILUS) capsule, Take 1 tablet by mouth Daily. Indications: Heart failure, status change, with med/diet change, Disp: , Rfl:     Multiple Vitamins-Minerals (PRESERVISION AREDS PO), Take 1 tablet by mouth 2 (Two) Times a Day. Indications: Vitamin and/or Mineral Deficiency, Disp: , Rfl:     O2 (OXYGEN), Inhale 2 L/min Every Night. Indications: Oxygen Therapy, Disp: , Rfl:     potassium chloride (K-DUR,KLOR-CON) 20 MEQ CR tablet, Take 1 tablet by mouth Daily., Disp: 90 tablet, Rfl: 3    predniSONE (DELTASONE) 10 MG tablet, Take 1 tablet by mouth Daily., Disp: , Rfl:     sodium chloride 7 % nebulizer solution nebulizer solution, Take 4 mL by nebulization 2 (Two) Times a Day., Disp: 240 mL, Rfl: 0         Objective:     Vitals:    07/30/24 0918 07/30/24 0955 07/30/24 0957 07/30/24 0959   BP: 122/68      BP Location: Left arm      Patient Position: Sitting      Cuff Size: Adult      Pulse: 72      SpO2: 94% 94% 90% 94%   Weight: 58.5 kg (129 lb)      Height: 162.6 cm (64.02\")        Vitals:    07/30/24 0918 07/30/24 0955 07/30/24 0957 07/30/24 0959   Orthostatic BP:  130/70 124/60 122/62   Orthostatic Pulse:  69 78 96   Patient Position: Sitting Lying Standing Standing        Body mass index is 22.13 kg/m².    PHYSICAL EXAM:    Vitals Reviewed.   General Appearance: No acute distress, well developed and well nourished.  Thin.  Appears younger than stated age.  In a wheelchair today.  Eyes: Glasses.   HENT: No hearing loss noted.    Respiratory: No signs of respiratory distress. Respiration rhythm and depth normal.  Right-sided rhonchi noted.  Left clear.  Cardiovascular:  Jugular Venous Pressure: Normal  Heart Rate and Rhythm: Normal, Heart Sounds: Normal S1 and S2. No S3 or S4 noted.  Murmurs: No murmurs noted. No rubs, thrills, or gallops.   Lower Extremities: No edema noted.  Musculoskeletal: Normal movement of extremities.  Skin: General appearance normal.  "   Psychiatric: Patient alert and oriented to person, place, and time. Speech and behavior appropriate. Normal mood and affect.       ECG 12 Lead    Date/Time: 7/30/2024 9:18 AM  Performed by: Nohemy Webber APRN    Authorized by: Nohemy Webber APRN  Comparison: compared with previous ECG from 12/7/2023  Similar to previous ECG  Rhythm: atrial fibrillation  Rate: normal  BPM: 72  Conduction: conduction normal  ST Depression: II, III, aVF, V3, V4, V5 and V6  QRS axis: normal    Clinical impression: abnormal EKG            Assessment:       Diagnosis Plan   1. Syncope, unspecified syncope type        2. Orthostasis        3. Permanent atrial fibrillation        4. Abnormal EKG        5. Chronic diastolic CHF (congestive heart failure)        6. Nonrheumatic mitral valve regurgitation        7. Nonrheumatic tricuspid valve regurgitation        8. Pulmonary hypertension        9. Pericardial effusion        10. Nonobstructive atherosclerosis of coronary artery               Plan:       1.  Syncope: Of unknown etiology.  The episode, she had bowel incontinence which is rare with orthostasis or vasovagal syncope.  Neurological evaluation was unremarkable.  She has had no further episodes and plans to follow-up with neurology.    2.  Orthostasis: Orthostatic blood pressures negative today.  Positive in the hospital.  I encouraged her to drink plenty of water throughout the day.  She will remain on bumetanide once daily.    3.  Permanent Atrial Fibrillation: Rate controlled on metoprolol.  [id number] score of 6 and remains on apixaban.  Denies bleeding.    4.  Chronically abnormal EKG with ST depression not felt to be ischemic.    5.  Acute on chronic diastolic heart failure.  Well compensated today.    6-9.  Mild to moderate mitral regurgitation, moderate tricuspid regurgitation, moderate pulmonary hypertension, trace aortic regurgitation, and small pericardial effusion noted on echocardiogram in October 2023.  I  do not see the need to repeat the echocardiogram.    10.  Nonobstructive coronary atherosclerosis.  Denies angina.    11.  I have asked them to call if she has any further episodes.  I will discuss with Dr. Robbins if she wants any further cardiac testing such as a 14-day Zio patch monitor.  She will follow-up with Dr. Robbins in 3 months.    ADDENDUM 8/5/2024:  - We have recommended a 14-day Holter monitor.  Daughter informed.  Scheduling will call to arrange.      As always, it has been a pleasure to participate in your patient's care. Thank you.         Sincerely,         PJ Sky  [facility] Cardiology      Dictated utilizing Dragon Dictation  I spent 30 minutes reviewing her medical records/testing/previous office notes/labs, face-to-face interaction with patient, physical examination, formulating the plan of care, and discussion of plan of care with patient.

## 2024-08-05 ENCOUNTER — TELEPHONE (OUTPATIENT)
Dept: CARDIOLOGY | Facility: CLINIC | Age: 89
End: 2024-08-05
Payer: MEDICARE

## 2024-08-05 NOTE — TELEPHONE ENCOUNTER
We have recommended a 14-day Holter monitor.  Daughter informed.  Scheduling will call to arrange.

## 2024-08-07 RX ORDER — APIXABAN 2.5 MG/1
TABLET, FILM COATED ORAL
Qty: 180 TABLET | Refills: 3 | Status: SHIPPED | OUTPATIENT
Start: 2024-08-07

## 2024-08-07 NOTE — TELEPHONE ENCOUNTER
8/7/24// Authorization sent to [employer] and approved.  I called and left a voicemail for the daughter about scheduling monitor.

## 2024-08-14 ENCOUNTER — HOSPITAL ENCOUNTER (INPATIENT)
Facility: HOSPITAL | Age: 89
LOS: 2 days | Discharge: HOME HEALTH | DRG: 536 | End: 2024-08-16
Attending: EMERGENCY MEDICINE | Admitting: STUDENT IN AN ORGANIZED HEALTH CARE EDUCATION/TRAINING PROGRAM
Payer: MEDICARE

## 2024-08-14 ENCOUNTER — APPOINTMENT (OUTPATIENT)
Dept: CT IMAGING | Facility: HOSPITAL | Age: 89
DRG: 536 | End: 2024-08-14
Payer: MEDICARE

## 2024-08-14 ENCOUNTER — APPOINTMENT (OUTPATIENT)
Dept: GENERAL RADIOLOGY | Facility: HOSPITAL | Age: 89
DRG: 536 | End: 2024-08-14
Payer: MEDICARE

## 2024-08-14 DIAGNOSIS — I48.11 LONGSTANDING PERSISTENT ATRIAL FIBRILLATION: ICD-10-CM

## 2024-08-14 DIAGNOSIS — S72.112A CLOSED DISPLACED FRACTURE OF GREATER TROCHANTER OF LEFT FEMUR, INITIAL ENCOUNTER: ICD-10-CM

## 2024-08-14 DIAGNOSIS — S72.115A CLOSED NONDISPLACED FRACTURE OF GREATER TROCHANTER OF LEFT FEMUR, INITIAL ENCOUNTER: Primary | ICD-10-CM

## 2024-08-14 DIAGNOSIS — I50.33 ACUTE ON CHRONIC DIASTOLIC CONGESTIVE HEART FAILURE: ICD-10-CM

## 2024-08-14 PROBLEM — I50.9 CHF (CONGESTIVE HEART FAILURE): Status: ACTIVE | Noted: 2024-08-14

## 2024-08-14 LAB
ALBUMIN SERPL-MCNC: 4 G/DL (ref 3.5–5.2)
ALBUMIN/GLOB SERPL: 1.4 G/DL
ALP SERPL-CCNC: 90 U/L (ref 39–117)
ALT SERPL W P-5'-P-CCNC: 28 U/L (ref 1–33)
ANION GAP SERPL CALCULATED.3IONS-SCNC: 9.1 MMOL/L (ref 5–15)
AST SERPL-CCNC: 32 U/L (ref 1–32)
BACTERIA UR QL AUTO: NORMAL /HPF
BASOPHILS # BLD AUTO: 0.03 10*3/MM3 (ref 0–0.2)
BASOPHILS NFR BLD AUTO: 0.3 % (ref 0–1.5)
BILIRUB SERPL-MCNC: 0.5 MG/DL (ref 0–1.2)
BILIRUB UR QL STRIP: NEGATIVE
BUN SERPL-MCNC: 18 MG/DL (ref 8–23)
BUN/CREAT SERPL: 15.7 (ref 7–25)
CALCIUM SPEC-SCNC: 9.1 MG/DL (ref 8.2–9.6)
CHLORIDE SERPL-SCNC: 100 MMOL/L (ref 98–107)
CLARITY UR: CLEAR
CO2 SERPL-SCNC: 26.9 MMOL/L (ref 22–29)
COLOR UR: YELLOW
CREAT SERPL-MCNC: 1.15 MG/DL (ref 0.57–1)
DEPRECATED RDW RBC AUTO: 59.9 FL (ref 37–54)
DIGOXIN SERPL-MCNC: 0.6 NG/ML (ref 0.6–1.2)
EGFRCR SERPLBLD CKD-EPI 2021: 44.2 ML/MIN/1.73
EOSINOPHIL # BLD AUTO: 0.04 10*3/MM3 (ref 0–0.4)
EOSINOPHIL NFR BLD AUTO: 0.4 % (ref 0.3–6.2)
ERYTHROCYTE [DISTWIDTH] IN BLOOD BY AUTOMATED COUNT: 15.4 % (ref 12.3–15.4)
GLOBULIN UR ELPH-MCNC: 2.8 GM/DL
GLUCOSE SERPL-MCNC: 185 MG/DL (ref 65–99)
GLUCOSE UR STRIP-MCNC: NEGATIVE MG/DL
HCT VFR BLD AUTO: 33.9 % (ref 34–46.6)
HGB BLD-MCNC: 10.6 G/DL (ref 12–15.9)
HGB UR QL STRIP.AUTO: ABNORMAL
HOLD SPECIMEN: NORMAL
HYALINE CASTS UR QL AUTO: NORMAL /LPF
IMM GRANULOCYTES # BLD AUTO: 0.17 10*3/MM3 (ref 0–0.05)
IMM GRANULOCYTES NFR BLD AUTO: 1.5 % (ref 0–0.5)
KETONES UR QL STRIP: NEGATIVE
LEUKOCYTE ESTERASE UR QL STRIP.AUTO: NEGATIVE
LYMPHOCYTES # BLD AUTO: 1.23 10*3/MM3 (ref 0.7–3.1)
LYMPHOCYTES NFR BLD AUTO: 10.9 % (ref 19.6–45.3)
MCH RBC QN AUTO: 33.7 PG (ref 26.6–33)
MCHC RBC AUTO-ENTMCNC: 31.3 G/DL (ref 31.5–35.7)
MCV RBC AUTO: 107.6 FL (ref 79–97)
MONOCYTES # BLD AUTO: 0.48 10*3/MM3 (ref 0.1–0.9)
MONOCYTES NFR BLD AUTO: 4.3 % (ref 5–12)
NEUTROPHILS NFR BLD AUTO: 82.6 % (ref 42.7–76)
NEUTROPHILS NFR BLD AUTO: 9.34 10*3/MM3 (ref 1.7–7)
NITRITE UR QL STRIP: NEGATIVE
NT-PROBNP SERPL-MCNC: 5391 PG/ML (ref 0–1800)
PH UR STRIP.AUTO: 7 [PH] (ref 5–8)
PLATELET # BLD AUTO: 248 10*3/MM3 (ref 140–450)
PMV BLD AUTO: 10.4 FL (ref 6–12)
POTASSIUM SERPL-SCNC: 4.7 MMOL/L (ref 3.5–5.2)
PROT SERPL-MCNC: 6.8 G/DL (ref 6–8.5)
PROT UR QL STRIP: NEGATIVE
QT INTERVAL: 315 MS
QTC INTERVAL: 375 MS
RBC # BLD AUTO: 3.15 10*6/MM3 (ref 3.77–5.28)
RBC # UR STRIP: NORMAL /HPF
REF LAB TEST METHOD: NORMAL
SODIUM SERPL-SCNC: 136 MMOL/L (ref 136–145)
SP GR UR STRIP: 1.01 (ref 1–1.03)
SQUAMOUS #/AREA URNS HPF: NORMAL /HPF
UROBILINOGEN UR QL STRIP: ABNORMAL
WBC # UR STRIP: NORMAL /HPF
WBC NRBC COR # BLD AUTO: 11.29 10*3/MM3 (ref 3.4–10.8)

## 2024-08-14 PROCEDURE — 81001 URINALYSIS AUTO W/SCOPE: CPT | Performed by: EMERGENCY MEDICINE

## 2024-08-14 PROCEDURE — 73552 X-RAY EXAM OF FEMUR 2/>: CPT

## 2024-08-14 PROCEDURE — 83880 ASSAY OF NATRIURETIC PEPTIDE: CPT | Performed by: EMERGENCY MEDICINE

## 2024-08-14 PROCEDURE — 85025 COMPLETE CBC W/AUTO DIFF WBC: CPT | Performed by: EMERGENCY MEDICINE

## 2024-08-14 PROCEDURE — 93010 ELECTROCARDIOGRAM REPORT: CPT | Performed by: INTERNAL MEDICINE

## 2024-08-14 PROCEDURE — 71045 X-RAY EXAM CHEST 1 VIEW: CPT

## 2024-08-14 PROCEDURE — 93005 ELECTROCARDIOGRAM TRACING: CPT | Performed by: EMERGENCY MEDICINE

## 2024-08-14 PROCEDURE — 80162 ASSAY OF DIGOXIN TOTAL: CPT | Performed by: EMERGENCY MEDICINE

## 2024-08-14 PROCEDURE — 73502 X-RAY EXAM HIP UNI 2-3 VIEWS: CPT

## 2024-08-14 PROCEDURE — 73700 CT LOWER EXTREMITY W/O DYE: CPT

## 2024-08-14 PROCEDURE — 99285 EMERGENCY DEPT VISIT HI MDM: CPT

## 2024-08-14 PROCEDURE — 99285 EMERGENCY DEPT VISIT HI MDM: CPT | Performed by: EMERGENCY MEDICINE

## 2024-08-14 PROCEDURE — 80053 COMPREHEN METABOLIC PANEL: CPT | Performed by: EMERGENCY MEDICINE

## 2024-08-14 PROCEDURE — 25010000002 FUROSEMIDE PER 20 MG: Performed by: EMERGENCY MEDICINE

## 2024-08-14 RX ORDER — HYDROMORPHONE HYDROCHLORIDE 1 MG/ML
0.25 INJECTION, SOLUTION INTRAMUSCULAR; INTRAVENOUS; SUBCUTANEOUS
Status: DISCONTINUED | OUTPATIENT
Start: 2024-08-14 | End: 2024-08-16 | Stop reason: HOSPADM

## 2024-08-14 RX ORDER — ONDANSETRON 2 MG/ML
4 INJECTION INTRAMUSCULAR; INTRAVENOUS EVERY 6 HOURS PRN
Status: DISCONTINUED | OUTPATIENT
Start: 2024-08-14 | End: 2024-08-16 | Stop reason: HOSPADM

## 2024-08-14 RX ORDER — CITALOPRAM 20 MG/1
20 TABLET ORAL DAILY
Status: DISCONTINUED | OUTPATIENT
Start: 2024-08-15 | End: 2024-08-16 | Stop reason: HOSPADM

## 2024-08-14 RX ORDER — FERROUS SULFATE 325(65) MG
325 TABLET ORAL
Status: DISCONTINUED | OUTPATIENT
Start: 2024-08-15 | End: 2024-08-16 | Stop reason: HOSPADM

## 2024-08-14 RX ORDER — BUDESONIDE AND FORMOTEROL FUMARATE DIHYDRATE 160; 4.5 UG/1; UG/1
2 AEROSOL RESPIRATORY (INHALATION)
Status: DISCONTINUED | OUTPATIENT
Start: 2024-08-15 | End: 2024-08-16 | Stop reason: HOSPADM

## 2024-08-14 RX ORDER — AMOXICILLIN 250 MG
2 CAPSULE ORAL 2 TIMES DAILY
Status: DISCONTINUED | OUTPATIENT
Start: 2024-08-15 | End: 2024-08-16 | Stop reason: HOSPADM

## 2024-08-14 RX ORDER — SODIUM CHLORIDE FOR INHALATION 7 %
4 VIAL, NEBULIZER (ML) INHALATION 2 TIMES DAILY
Status: DISCONTINUED | OUTPATIENT
Start: 2024-08-15 | End: 2024-08-16 | Stop reason: HOSPADM

## 2024-08-14 RX ORDER — ONDANSETRON 4 MG/1
4 TABLET, ORALLY DISINTEGRATING ORAL EVERY 6 HOURS PRN
Status: DISCONTINUED | OUTPATIENT
Start: 2024-08-14 | End: 2024-08-16 | Stop reason: HOSPADM

## 2024-08-14 RX ORDER — IPRATROPIUM BROMIDE AND ALBUTEROL SULFATE 2.5; .5 MG/3ML; MG/3ML
3 SOLUTION RESPIRATORY (INHALATION) 2 TIMES DAILY
Status: DISCONTINUED | OUTPATIENT
Start: 2024-08-15 | End: 2024-08-16 | Stop reason: HOSPADM

## 2024-08-14 RX ORDER — HYDROCODONE BITARTRATE AND ACETAMINOPHEN 5; 325 MG/1; MG/1
1 TABLET ORAL EVERY 4 HOURS PRN
Status: DISCONTINUED | OUTPATIENT
Start: 2024-08-14 | End: 2024-08-16 | Stop reason: HOSPADM

## 2024-08-14 RX ORDER — POLYETHYLENE GLYCOL 3350 17 G/17G
17 POWDER, FOR SOLUTION ORAL DAILY PRN
Status: DISCONTINUED | OUTPATIENT
Start: 2024-08-14 | End: 2024-08-16 | Stop reason: HOSPADM

## 2024-08-14 RX ORDER — FUROSEMIDE 10 MG/ML
60 INJECTION INTRAMUSCULAR; INTRAVENOUS ONCE
Status: COMPLETED | OUTPATIENT
Start: 2024-08-14 | End: 2024-08-14

## 2024-08-14 RX ORDER — ALBUTEROL SULFATE 2.5 MG/3ML
2.5 SOLUTION RESPIRATORY (INHALATION)
Status: DISCONTINUED | OUTPATIENT
Start: 2024-08-14 | End: 2024-08-16 | Stop reason: HOSPADM

## 2024-08-14 RX ORDER — BISACODYL 5 MG/1
5 TABLET, DELAYED RELEASE ORAL DAILY PRN
Status: DISCONTINUED | OUTPATIENT
Start: 2024-08-14 | End: 2024-08-16 | Stop reason: HOSPADM

## 2024-08-14 RX ORDER — CARVEDILOL 3.12 MG/1
3.12 TABLET ORAL 2 TIMES DAILY WITH MEALS
Status: DISCONTINUED | OUTPATIENT
Start: 2024-08-15 | End: 2024-08-16 | Stop reason: HOSPADM

## 2024-08-14 RX ORDER — PREDNISONE 10 MG/1
10 TABLET ORAL DAILY
Status: DISCONTINUED | OUTPATIENT
Start: 2024-08-15 | End: 2024-08-16 | Stop reason: HOSPADM

## 2024-08-14 RX ORDER — BISACODYL 10 MG
10 SUPPOSITORY, RECTAL RECTAL DAILY PRN
Status: DISCONTINUED | OUTPATIENT
Start: 2024-08-14 | End: 2024-08-16 | Stop reason: HOSPADM

## 2024-08-14 RX ADMIN — FUROSEMIDE 60 MG: 10 INJECTION, SOLUTION INTRAMUSCULAR; INTRAVENOUS at 17:27

## 2024-08-14 NOTE — [facility] PROVIDER NOTE
Subjective   History of Present Illness  89yo female pmh significant htn/hyperlipidemia/HFpEF/atrial fibrillation/CAD presents ED c/o4d hx left hip pain s/p mechanical ground level fall while ambulating with walker.  Pt denies head trauma/loc/neck pain/back pain/chest pain/soa/abd pain.    History provided by:  Patient and relative  Illness      Review of Systems   Constitutional: Negative.    HENT: Negative.     Eyes: Negative.    Respiratory: Negative.     Cardiovascular: Negative.    Gastrointestinal: Negative.    Musculoskeletal:  Positive for arthralgias.   Neurological:  Negative for syncope.   All other systems reviewed and are negative.      Past Medical History:   Diagnosis Date    Aspergillus     Asthma     Atrial fibrillation     chronic    Atrial flutter     Bronchiectasis     C. difficile diarrhea 03/11/2017    CAD (coronary artery disease)     nonobstructive    Chronic diastolic CHF (congestive heart failure)     Clinical diagnosis of COVID-19 01/04/2022    Colitis     Cough     Cryoglobulinemia     Dyspnea on exertion     Exposure to hepatitis A 04/20/2018    Fall     Hyperlipidemia     Hypertension     Hyponatremia     Hypoxia     Infectious viral hepatitis     AGE 13    Left shoulder pain     Leg swelling     Lesion of lung     Malignant hyperthermia due to anesthesia     Mild tricuspid regurgitation     MR (mitral regurgitation)     mild    MVP (mitral valve prolapse)     Permanent atrial fibrillation     Pneumonia of both lungs due to infectious organism 05/31/2017    Followed BY ID for MAC      Pneumonia with the fungal infection aspergillosis 01/06/2017    Pneumothorax     SOB (shortness of breath)     Syncope 07/2024    UTI (urinary tract infection)     Wheeze     mild       Allergies   Allergen Reactions    Amlodipine Besylate Swelling    Aspirin GI Intolerance     Caused bleeding ulcers    Bactrim [Sulfamethoxazole-Trimethoprim] Nausea And Vomiting    Erythromycin Unknown (See Comments)      Patient does not recall type of reaction.    Levaquin [Levofloxacin] Unknown (See Comments)     Nausea      Nitrofurantoin Nausea Only and Nausea And Vomiting    Ramipril Other (See Comments)     Cough       Past Surgical History:   Procedure Laterality Date    BRONCHOSCOPY N/A 11/12/2016    Procedure: BRONCHOSCOPY WITH FLUORO, BRUSHINGS, BAL, AND BIOPSIES;  Surgeon: Rogelio Tucker MD;  Location: [facility] ENDOSCOPY;  Service:     BRONCHOSCOPY Bilateral 06/03/2017    Procedure: BRONCHOSCOPY with BAL ;  Surgeon: Sung King MD;  Location: [facility] ENDOSCOPY;  Service:     BRONCHOSCOPY N/A 12/17/2019    Procedure: BRONCHOSCOPY WITH WASHINGS;  Surgeon: Rogelio Tucker MD;  Location: [facility] ENDOSCOPY;  Service: Pulmonary    BRONCHOSCOPY N/A 07/15/2022    Procedure: BRONCHOSCOPY;  Surgeon: Rogelio Tucker MD;  Location: [facility] ENDOSCOPY;  Service: Pulmonary;  Laterality: N/A;  Pre: Pneumonia  Post: Pneumonia    BRONCHOSCOPY N/A 10/2/2023    Procedure: BRONCHOSCOPY WITH BRONCHIAL AVEOLAR LAVAGE;  Surgeon: Rogelio Tucker MD;  Location: [facility] ENDOSCOPY;  Service: Pulmonary;  Laterality: N/A;  PRE:BRONCHIECTASIS /   POST: SAME    CATARACT EXTRACTION EXTRACAPSULAR W/ INTRAOCULAR LENS IMPLANTATION      COLONOSCOPY      2013    D & C WITH SUCTION      HYSTERECTOMY      KNEE ARTHROSCOPY Left     Partial       Family History   Problem Relation Age of Onset    Hypertension Mother     Stroke Mother     Heart disease Father     Hypertension Father     Cancer Brother     Cancer Son        Social History     Socioeconomic History    Marital status:    Tobacco Use    Smoking status: Never    Smokeless tobacco: Never    Tobacco comments:     caffiene daily   Vaping Use    Vaping status: Never Used   Substance and Sexual Activity    Alcohol use: Not Currently     Alcohol/week: 2.0 standard drinks of alcohol     Types: 1 Glasses of wine, 1 Shots of liquor per week    Drug use: No    Sexual activity: Defer     Partners: Male           Objective    Physical Exam  Vitals and nursing note reviewed.   Constitutional:       Appearance: Normal appearance.   HENT:      Head: Normocephalic and atraumatic.      Right Ear: External ear normal.      Left Ear: External ear normal.      Nose: Nose normal.      Mouth/Throat:      Mouth: Mucous membranes are moist.      Pharynx: Oropharynx is clear.   Eyes:      Conjunctiva/sclera: Conjunctivae normal.      Pupils: Pupils are equal, round, and reactive to light.   Cardiovascular:      Rate and Rhythm: Normal rate. Rhythm irregular.      Pulses: Normal pulses.      Heart sounds: Normal heart sounds. No murmur heard.     No friction rub. No gallop.   Pulmonary:      Effort: Pulmonary effort is normal.      Breath sounds: Examination of the right-upper field reveals rales. Examination of the left-upper field reveals rales. Examination of the right-middle field reveals rales. Examination of the left-middle field reveals rales. Examination of the right-lower field reveals decreased breath sounds. Examination of the left-lower field reveals decreased breath sounds. No wheezing or rhonchi.   Musculoskeletal:         General: Tenderness present. No swelling or deformity.      Cervical back: Normal range of motion and neck supple. No rigidity.      Left hip: Tenderness and bony tenderness present. No deformity or crepitus. Decreased range of motion. Normal strength.      Left upper leg: Normal.      Right lower leg: No edema.      Left lower leg: Normal. No edema.        Legs:    Lymphadenopathy:      Cervical: No cervical adenopathy.   Skin:     General: Skin is warm and dry.   Neurological:      General: No focal deficit present.      Mental Status: She is alert and oriented to person, place, and time.      GCS: GCS eye subscore is 4. GCS verbal subscore is 5. GCS motor subscore is 6.      Sensory: Sensation is intact.      Motor: Motor function is intact.         ECG 12 Lead      Date/Time: 8/14/2024 5:05 PM    Performed by:  Ladarius Carmichael MD  Authorized by: Ladarius Carmichael MD  Interpreted by ED physician  Rhythm: atrial fibrillation  Rate: normal  BPM: 85  QRS axis: normal  Conduction: conduction normal  ST Depression: V4, V5 and V6  T depression: V3, V4, V6 and V5  Other: no other findings  Clinical impression: abnormal ECG and dysrhythmia - atrial               ED Course  ED Course as of 08/14/24 1739   Wed Aug 14, 2024   1703 [facility] paged for admit. [SD]   1737 D/w Dr. Lev Melgar, [facility] hospitalist accepting admit. Pt stable transport. [SD]      ED Course User Index  [SD] Ladarius Carmichael MD      Labs Reviewed   COMPREHENSIVE METABOLIC PANEL - Abnormal; Notable for the following components:       Result Value    Glucose 185 (*)     Creatinine 1.15 (*)     eGFR 44.2 (*)     All other components within normal limits    Narrative:     GFR Normal >60  Chronic Kidney Disease <60  Kidney Failure <15    The GFR formula is only valid for adults with stable renal function between ages 18 and 70.   BNP (IN-HOUSE) - Abnormal; Notable for the following components:    proBNP 5,391.0 (*)     All other components within normal limits    Narrative:     This assay is used as an aid in the diagnosis of individuals suspected of having heart failure. It can be used as an aid in the diagnosis of acute decompensated heart failure (ADHF) in patients presenting with signs and symptoms of ADHF to the emergency department (ED). In addition, NT-proBNP of <300 pg/mL indicates ADHF is not likely.    Age Range Result Interpretation  NT-proBNP Concentration (pg/mL:      <50             Positive            >450                   Gray                 300-450                    Negative             <300    50-75           Positive            >900                  Gray                300-900                  Negative            <300      >75             Positive            >1800                  Gray                300-1800                  Negative            <300    CBC WITH AUTO DIFFERENTIAL - Abnormal; Notable for the following components:    WBC 11.29 (*)     RBC 3.15 (*)     Hemoglobin 10.6 (*)     Hematocrit 33.9 (*)     .6 (*)     MCH 33.7 (*)     MCHC 31.3 (*)     RDW-SD 59.9 (*)     Neutrophil % 82.6 (*)     Lymphocyte % 10.9 (*)     Monocyte % 4.3 (*)     Immature Grans % 1.5 (*)     Neutrophils, Absolute 9.34 (*)     Immature Grans, Absolute 0.17 (*)     All other components within normal limits   DIGOXIN LEVEL - Normal   URINALYSIS W/ CULTURE IF INDICATED   CBC AND DIFFERENTIAL    Narrative:     The following orders were created for panel order CBC & Differential.  Procedure                               Abnormality         Status                     ---------                               -----------         ------                     CBC Auto Differential[[id number]]        Abnormal            Final result                 Please view results for these tests on the individual orders.     XR Chest 1 View, XR Hip With or Without Pelvis 2 - 3 View Left, XR Femur 2 View Left    Result Date: 8/14/2024  Narrative: XR FEMUR 2 VW LEFT-, XR HIP W OR WO PELVIS 2-3 VIEW LEFT-, XR CHEST 1 VW-  INDICATIONS: Congestive heart failure, hip pain, trauma 2 days ago.  TECHNIQUE: 4 VIEWS OF THE LEFT FEMUR, FRONTAL VIEW OF THE CHEST, FRONTAL VIEW OF THE PELVIS, 2 VIEWS OF THE LEFT HIP  COMPARISON: 7/10/2024  FINDINGS:  Chest x-ray: The heart is enlarged. Pulm vasculature is unremarkable. Aorta is tortuous, calcified. Linear likely atelectasis or scarring at the right midlung. No focal pulmonary consolidation, pleural effusion, or pneumothorax. Chronic deformity of the left clavicle. No acute fracture is identified.  Pelvis, left hip, left femur: The pelvic ring appears intact. A nondisplaced fracture involving the left greater trochanter is apparent, the extent of which may be better characterized with cross-sectional imaging. No other fractures are identified. No dislocation.  Degenerative change is seen at the symphysis pubis. Medial left knee arthroplasty hardware appears intact. Arterial calcifications are present.      Impression:  As described.    This report was finalized on 8/14/2024 3:39 PM by Dr. Steven Garza M.D on Workstation: [id number]                                          Medical Decision Making  Problems Addressed:  Acute on chronic diastolic congestive heart failure: complicated acute illness or injury  Closed nondisplaced fracture of greater trochanter of left femur, initial encounter: complicated acute illness or injury  Longstanding persistent atrial fibrillation: complicated acute illness or injury    Amount and/or Complexity of Data Reviewed  Labs: ordered.  Radiology: ordered.  ECG/medicine tests: ordered and independent interpretation performed.    Risk  Prescription drug management.  Decision regarding hospitalization.        Final diagnoses:   Closed nondisplaced fracture of greater trochanter of left femur, initial encounter   Acute on chronic diastolic congestive heart failure   Longstanding persistent atrial fibrillation       ED Disposition  ED Disposition       ED Disposition   Decision to Admit    Condition   --    Comment   Level of Care: Telemetry [5]   Diagnosis: CHF (congestive heart failure) [567940]   Admitting Physician: TERRY MADERA [[id number]]   Attending Physician: TERRY MADERA [[id number]]   Certification: I Certify That Inpatient Hospital Services Are Medically Necessary For Greater Than 2 Midnights                 No follow-up provider specified.       Medication List      No changes were made to your prescriptions during this visit.

## 2024-08-15 LAB
ANION GAP SERPL CALCULATED.3IONS-SCNC: 11.3 MMOL/L (ref 5–15)
BUN SERPL-MCNC: 13 MG/DL (ref 8–23)
BUN/CREAT SERPL: 15.5 (ref 7–25)
CALCIUM SPEC-SCNC: 9.2 MG/DL (ref 8.2–9.6)
CHLORIDE SERPL-SCNC: 104 MMOL/L (ref 98–107)
CO2 SERPL-SCNC: 27.7 MMOL/L (ref 22–29)
CREAT SERPL-MCNC: 0.84 MG/DL (ref 0.57–1)
DEPRECATED RDW RBC AUTO: 49.4 FL (ref 37–54)
EGFRCR SERPLBLD CKD-EPI 2021: 64.5 ML/MIN/1.73
ERYTHROCYTE [DISTWIDTH] IN BLOOD BY AUTOMATED COUNT: 13.7 % (ref 12.3–15.4)
GLUCOSE SERPL-MCNC: 100 MG/DL (ref 65–99)
HCT VFR BLD AUTO: 34.1 % (ref 34–46.6)
HGB BLD-MCNC: 11 G/DL (ref 12–15.9)
MCH RBC QN AUTO: 32.1 PG (ref 26.6–33)
MCHC RBC AUTO-ENTMCNC: 32.3 G/DL (ref 31.5–35.7)
MCV RBC AUTO: 99.4 FL (ref 79–97)
PLATELET # BLD AUTO: 291 10*3/MM3 (ref 140–450)
PMV BLD AUTO: 9.8 FL (ref 6–12)
POTASSIUM SERPL-SCNC: 3.5 MMOL/L (ref 3.5–5.2)
RBC # BLD AUTO: 3.43 10*6/MM3 (ref 3.77–5.28)
SODIUM SERPL-SCNC: 143 MMOL/L (ref 136–145)
WBC NRBC COR # BLD AUTO: 10.33 10*3/MM3 (ref 3.4–10.8)

## 2024-08-15 PROCEDURE — 94799 UNLISTED PULMONARY SVC/PX: CPT

## 2024-08-15 PROCEDURE — 94640 AIRWAY INHALATION TREATMENT: CPT

## 2024-08-15 PROCEDURE — 85027 COMPLETE CBC AUTOMATED: CPT | Performed by: STUDENT IN AN ORGANIZED HEALTH CARE EDUCATION/TRAINING PROGRAM

## 2024-08-15 PROCEDURE — 94761 N-INVAS EAR/PLS OXIMETRY MLT: CPT

## 2024-08-15 PROCEDURE — 80048 BASIC METABOLIC PNL TOTAL CA: CPT | Performed by: STUDENT IN AN ORGANIZED HEALTH CARE EDUCATION/TRAINING PROGRAM

## 2024-08-15 PROCEDURE — 94664 DEMO&/EVAL PT USE INHALER: CPT

## 2024-08-15 RX ORDER — DIGOXIN 125 MCG
125 TABLET ORAL
Status: DISCONTINUED | OUTPATIENT
Start: 2024-08-16 | End: 2024-08-16 | Stop reason: HOSPADM

## 2024-08-15 RX ORDER — BUMETANIDE 2 MG/1
2 TABLET ORAL DAILY
Status: DISCONTINUED | OUTPATIENT
Start: 2024-08-16 | End: 2024-08-16 | Stop reason: HOSPADM

## 2024-08-15 RX ADMIN — IPRATROPIUM BROMIDE AND ALBUTEROL SULFATE 3 ML: .5; 3 SOLUTION RESPIRATORY (INHALATION) at 20:00

## 2024-08-15 RX ADMIN — Medication 4 ML: at 20:01

## 2024-08-15 RX ADMIN — BUDESONIDE AND FORMOTEROL FUMARATE DIHYDRATE 2 PUFF: 160; 4.5 AEROSOL RESPIRATORY (INHALATION) at 20:06

## 2024-08-15 RX ADMIN — SENNOSIDES AND DOCUSATE SODIUM 2 TABLET: 50; 8.6 TABLET ORAL at 20:30

## 2024-08-15 RX ADMIN — IPRATROPIUM BROMIDE AND ALBUTEROL SULFATE 3 ML: .5; 3 SOLUTION RESPIRATORY (INHALATION) at 07:42

## 2024-08-15 RX ADMIN — CARVEDILOL 3.12 MG: 3.12 TABLET, FILM COATED ORAL at 09:02

## 2024-08-15 RX ADMIN — CARVEDILOL 3.12 MG: 3.12 TABLET, FILM COATED ORAL at 17:48

## 2024-08-15 RX ADMIN — Medication 4 ML: at 07:42

## 2024-08-15 RX ADMIN — BUDESONIDE AND FORMOTEROL FUMARATE DIHYDRATE 2 PUFF: 160; 4.5 AEROSOL RESPIRATORY (INHALATION) at 07:42

## 2024-08-15 NOTE — PROGRESS NOTES
Name: Agnieszka Rizzo ADMIT: 2024   : 1930  PCP: Matt Rose MD    MRN: [id number] LOS: 1 days   AGE/SEX: 89 y.o. female  ROOM: [facility]     Subjective   Subjective   Feeling fine today. Left hip hurts but is manageable. Moving LLE fine. No N/V/D/abd pain. Hungry. Voiding well. No F/C/NS. No SOA or CP or palp.        Objective   Objective   Vital Signs  Temp:  [97.5 °F (36.4 °C)-97.9 °F (36.6 °C)] 97.5 °F (36.4 °C)  Heart Rate:  [60-91] 76  Resp:  [18] 18  BP: (115-159)/(66-95) 154/77  SpO2:  [91 %-99 %] 92 %  on  Flow (L/min):  [2] 2;   Device (Oxygen Therapy): nasal cannula  Body mass index is 24.21 kg/m².  Physical Exam  Vitals and nursing note reviewed.   Constitutional:       General: She is not in acute distress.     Appearance: She is not ill-appearing, toxic-appearing or diaphoretic.   HENT:      Head: Normocephalic.      Nose: Nose normal.      Mouth/Throat:      Mouth: Mucous membranes are moist.      Pharynx: Oropharynx is clear.   Eyes:      General: No scleral icterus.        Right eye: No discharge.         Left eye: No discharge.      Conjunctiva/sclera: Conjunctivae normal.   Cardiovascular:      Rate and Rhythm: Normal rate. Rhythm irregular.      Pulses: Normal pulses.   Pulmonary:      Effort: Pulmonary effort is normal. No respiratory distress.      Breath sounds: Normal breath sounds. No wheezing or rales.   Abdominal:      General: Bowel sounds are normal. There is no distension.      Palpations: Abdomen is soft.      Tenderness: There is no abdominal tenderness.   Musculoskeletal:         General: No swelling or deformity.      Cervical back: Neck supple.      Comments: Surprisingly good ROM of LLE w/o pain  NVI in distal BLEs   Skin:     General: Skin is warm and dry.      Capillary Refill: Capillary refill takes less than 2 seconds.   Neurological:      General: No focal deficit present.      Mental Status: She is alert and oriented to person, place, and time. Mental status  "is at baseline.      Cranial Nerves: No cranial nerve deficit.      Coordination: Coordination normal.   Psychiatric:         Mood and Affect: Mood normal.         Behavior: Behavior normal.         Thought Content: Thought content normal.       Results Review     I reviewed the patient's new clinical results.  Results from last 7 days   Lab Units 08/15/24  0342 08/14/24  1532   WBC 10*3/mm3 10.33 11.29*   HEMOGLOBIN g/dL 11.0* 10.6*   PLATELETS 10*3/mm3 291 248     Results from last 7 days   Lab Units 08/15/24  0342 08/14/24  1532   SODIUM mmol/L 143 136   POTASSIUM mmol/L 3.5 4.7   CHLORIDE mmol/L 104 100   CO2 mmol/L 27.7 26.9   BUN mg/dL 13 18   CREATININE mg/dL 0.84 1.15*   GLUCOSE mg/dL 100* 185*   EGFR mL/min/1.73 64.5 44.2*     Results from last 7 days   Lab Units 08/14/24  1532   ALBUMIN g/dL 4.0   BILIRUBIN mg/dL 0.5   ALK PHOS U/L 90   AST (SGOT) U/L 32   ALT (SGPT) U/L 28     Results from last 7 days   Lab Units 08/15/24  0342 08/14/24  1532   CALCIUM mg/dL 9.2 9.1   ALBUMIN g/dL  --  4.0       No results found for: \"HGBA1C\", \"POCGLU\"    CT Lower Extremity Left Without Contrast    Result Date: 8/14/2024   1. Nondisplaced fracture of the posterior left greater trochanter. 2. Probable old healed fracture deformities of the left puboacetabular junction, left inferior pubic ramus, and the coccyx. 3. Multifocal DJD with chondrocalcinosis indicating CPPD.  This report was finalized on 8/14/2024 6:07 PM by Scout Sandoval MD on Workstation: [id number]      XR Chest 1 View    Result Date: 8/14/2024   As described.    This report was finalized on 8/14/2024 3:39 PM by Dr. Steven Garza M.D on Workstation: [id number]      XR Hip With or Without Pelvis 2 - 3 View Left    Result Date: 8/14/2024   As described.    This report was finalized on 8/14/2024 3:39 PM by Dr. Steven Garza M.D on Workstation: [id number]      XR Femur 2 View Left    Result Date: 8/14/2024   As described.    This report was finalized on " 8/14/2024 3:39 PM by Dr. Steven Garza M.D on Workstation: [id number]       I have personally reviewed all medications:  Scheduled Medications  budesonide-formoterol, 2 puff, Inhalation, BID - RT  carvedilol, 3.125 mg, Oral, BID With Meals  citalopram, 20 mg, Oral, Daily  [START ON 8/16/2024] digoxin, 125 mcg, Oral, Q48H  ferrous sulfate, 325 mg, Oral, Daily With Breakfast  ipratropium-albuterol, 3 mL, Nebulization, BID  predniSONE, 10 mg, Oral, Daily  senna-docusate sodium, 2 tablet, Oral, BID  sodium chloride, 4 mL, Nebulization, BID    Infusions   Diet  Diet: Cardiac; Healthy Heart (2-3 Na+); Fluid Consistency: Thin (IDDSI 0)    I have personally reviewed:  [x]  Laboratory   []  Microbiology   [x]  Radiology   [x]  EKG/Telemetry  []  Cardiology/Vascular   []  Pathology    [x]  Records       Assessment/Plan     Active Hospital Problems    Diagnosis  POA    **Fracture of greater trochanter of left femur [S72.112A]  Yes    Chronic diastolic CHF (congestive heart failure) [I50.32]  Yes    Permanent atrial fibrillation [I48.21]  Yes    Primary hypertension [I10]  Yes    Nonobstructive atherosclerosis of coronary artery [I25.10]  Yes    Mitral valve insufficiency [I34.0]  Yes      Resolved Hospital Problems   No resolved problems to display.     89yo woman with HFpEF, HTN, and A-fib who presented with mechanical fall and left hip pain. Admitted with left hip fracture.     Left greater trochanter fracture, nondisplaced  Mechanical fall  -Seen on CT  -Orthopedic Surgery consult pending  -Continue PRN Norco, PRN Dilaudid for breakthrough, bowel regimen  -PT/OT post-op     Permanent AFib  -RC: Coreg and Digoxin, HRs acceptable, restart Digoxin tomorrow (takes every other day)  -AC: Apixaban 2.5mg--ON HOLD in case surgery planned  -followed by [facility], most recent Echo 10/20/2023 with mild to moderate MR, moderate TR, and moderate pulm HTN  -recommendation at most recent visit 7/30/2024: no need to repeat.    -She has  nonobstructive CAD. No angina, no further testing was recommended at last visit.     Chronic diastolic heart failure  -BNP 5K in ER, her baseline is around 3.5-4K  -CXR unremarkable  -received dose of IV Lasix at [facility]  -appears euvolemic at present  -restart her oral Bumex in AM     COPD  On 2L nocturnal O2  -continue Symbicort and BID DuoNebs  -nocturnal O2 by NC     Leukocytosis  -Likely reactive, resolved w/o tx    Hyperglycemia  -sugars okay this AM  -check A1c       SCDs for DVT prophylaxis.  Limited code (no CPR, no intubation).  Discussed with patient. D/w care team at morning huddle.  Anticipate discharge  SNF,  timing yet to be determined.  Expected Discharge Date: 8/19/2024; Expected Discharge Time:       Geovanny Cespedes MD  [facility]  08/15/24  13:53 EDT

## 2024-08-15 NOTE — H&P
Patient Name:  Agnieszka Rizzo  YOB: 1930  MRN:  [id number]  Admit Date:  8/14/2024  Patient Care Team:  Matt Rose MD as PCP - General (Family Medicine)  Marcie Robbins MD as Cardiologist (Cardiology)  Nilesh Danielle MD as Consulting Physician (Urology)  Lina Morris RPH as Pharmacist  Lev Mckeon PharmD as Pharmacist (Pharmacy)  Rogelio Tucker MD as Consulting Physician (Pulmonary Disease)      Subjective   History Present Illness     Chief Complaint   Patient presents with    Fall    Hip Pain       Ms. Rizzo is a 89 y.o. female with HFpEF, HTN, A-fib presenting with mechanical fall and hip pain.  Majority of history obtained from chart review and ED provider as patient is little confused after receiving pain med at outside facility.  Patient presented to [facility] with reported mechanical ground-level fall while ambulating with her walker.  She landed on her hip.  Imaging there noted a fracture to the left greater trochanter.  She currently denies any acute complaints at this time.  No fevers, chills, chest pain, shortness of breath.  Does not feel more short of breath compared to baseline.  She wears oxygen at night.    Review of Systems   Constitutional:  Negative for chills, fatigue and fever.   Respiratory:  Negative for chest tightness, shortness of breath and wheezing.    Cardiovascular:  Negative for chest pain.   Gastrointestinal:  Negative for abdominal pain, diarrhea, nausea and vomiting.   Genitourinary:  Negative for difficulty urinating, dysuria, frequency and urgency.   Musculoskeletal:  Positive for gait problem.   Neurological:  Negative for syncope and headaches.        Personal History     Past Medical History:   Diagnosis Date    Aspergillus     Asthma     Atrial fibrillation     chronic    Atrial flutter     Bronchiectasis     C. difficile diarrhea 03/11/2017    CAD (coronary artery disease)     nonobstructive    Chronic diastolic CHF (congestive heart failure)      Clinical diagnosis of COVID-19 01/04/2022    Colitis     Cough     Cryoglobulinemia     Dyspnea on exertion     Exposure to hepatitis A 04/20/2018    Fall     Hyperlipidemia     Hypertension     Hyponatremia     Hypoxia     Infectious viral hepatitis     AGE 13    Left shoulder pain     Leg swelling     Lesion of lung     Malignant hyperthermia due to anesthesia     Mild tricuspid regurgitation     MR (mitral regurgitation)     mild    MVP (mitral valve prolapse)     Permanent atrial fibrillation     Pneumonia of both lungs due to infectious organism 05/31/2017    Followed BY ID for MAC      Pneumonia with the fungal infection aspergillosis 01/06/2017    Pneumothorax     SOB (shortness of breath)     Syncope 07/2024    UTI (urinary tract infection)     Wheeze     mild     Past Surgical History:   Procedure Laterality Date    BRONCHOSCOPY N/A 11/12/2016    Procedure: BRONCHOSCOPY WITH FLUORO, BRUSHINGS, BAL, AND BIOPSIES;  Surgeon: Rogelio Tucker MD;  Location: [facility] ENDOSCOPY;  Service:     BRONCHOSCOPY Bilateral 06/03/2017    Procedure: BRONCHOSCOPY with BAL ;  Surgeon: Sung King MD;  Location: [facility] ENDOSCOPY;  Service:     BRONCHOSCOPY N/A 12/17/2019    Procedure: BRONCHOSCOPY WITH WASHINGS;  Surgeon: Rogelio Tucker MD;  Location: [facility] ENDOSCOPY;  Service: Pulmonary    BRONCHOSCOPY N/A 07/15/2022    Procedure: BRONCHOSCOPY;  Surgeon: Rogelio Tucker MD;  Location: [facility] ENDOSCOPY;  Service: Pulmonary;  Laterality: N/A;  Pre: Pneumonia  Post: Pneumonia    BRONCHOSCOPY N/A 10/2/2023    Procedure: BRONCHOSCOPY WITH BRONCHIAL AVEOLAR LAVAGE;  Surgeon: Rogelio Tucker MD;  Location: [facility] ENDOSCOPY;  Service: Pulmonary;  Laterality: N/A;  PRE:BRONCHIECTASIS /   POST: SAME    CATARACT EXTRACTION EXTRACAPSULAR W/ INTRAOCULAR LENS IMPLANTATION      COLONOSCOPY      2013    D & C WITH SUCTION      HYSTERECTOMY      KNEE ARTHROSCOPY Left     Partial     Family History   Problem Relation Age of Onset    Hypertension  Mother     Stroke Mother     Heart disease Father     Hypertension Father     Cancer Brother     Cancer Son      Social History     Tobacco Use    Smoking status: Never    Smokeless tobacco: Never    Tobacco comments:     caffiene daily   Vaping Use    Vaping status: Never Used   Substance Use Topics    Alcohol use: Not Currently     Alcohol/week: 2.0 standard drinks of alcohol     Types: 1 Glasses of wine, 1 Shots of liquor per week    Drug use: No     No current facility-administered medications on file prior to encounter.     Current Outpatient Medications on File Prior to Encounter   Medication Sig Dispense Refill    azithromycin (ZITHROMAX) 250 MG tablet Take 1 tablet by mouth Daily.      bumetanide (BUMEX) 2 MG tablet Take 1 tablet by mouth Daily. Indications: Cardiac Failure, Edema      carvedilol (COREG) 3.125 MG tablet Take 1 tablet by mouth 2 (Two) Times a Day With Meals. Indications: Cardiac Failure, High Blood Pressure Disorder 180 tablet 3    citalopram (CeleXA) 20 MG tablet TAKE 1 TABLET BY MOUTH DAILY FOR MAJOR DEPRESSION 30 tablet 1    Eliquis 2.5 MG tablet tablet TAKE 1 TABLET EVERY 12 HOURS, FULL ANTICOAGULATION 180 tablet 3    FeroSul 325 (65 Fe) MG tablet TAKE ONE TABLET BY MOUTH DAILY WITH BREAKFAST (Patient taking differently: Take 1 tablet by mouth Daily.) 90 tablet 0    fexofenadine (ALLEGRA) 180 MG tablet Take 1 tablet by mouth Daily. Indications: Hayfever      fluticasone-salmeterol (ADVAIR HFA) 115-21 MCG/ACT inhaler Inhale 2 puffs 2 (Two) Times a Day. Indications: Asthma      guaiFENesin (MUCINEX) 600 MG 12 hr tablet Take 1 tablet by mouth 2 (Two) Times a Day. Indications: Cough      ipratropium-albuterol (DUO-NEB) 0.5-2.5 mg/3 ml nebulizer Take 3 mL by nebulization 2 (Two) Times a Day. Indications: Asthma      Lactobacillus (FLORAJEN ACIDOPHILUS) capsule Take 1 tablet by mouth Daily. Indications: Heart failure, status change, with med/diet change      potassium chloride (K-DUR,KLOR-CON)  20 MEQ CR tablet Take 1 tablet by mouth Daily. 90 tablet 3    predniSONE (DELTASONE) 10 MG tablet Take 1 tablet by mouth Daily.      sodium chloride 7 % nebulizer solution nebulizer solution Take 4 mL by nebulization 2 (Two) Times a Day. 240 mL 0    acetaminophen (TYLENOL) 325 MG tablet Take 2 tablets by mouth Every 4 (Four) Hours As Needed for Moderate Pain. Indications: Pain      albuterol sulfate  (90 Base) MCG/ACT inhaler Inhale 2 puffs Every 6 (Six) Hours As Needed for Wheezing or Shortness of Air. 8 g 11    benzonatate (TESSALON) 200 MG capsule Take 1 capsule by mouth 3 (Three) Times a Day As Needed for Cough. Indications: Cough      digoxin (LANOXIN) 125 MCG tablet Take 1 tablet by mouth Every Other Day. Indications: Atrial Fibrillation, Cardiac Failure      Multiple Vitamins-Minerals (PRESERVISION AREDS PO) Take 1 tablet by mouth 2 (Two) Times a Day. Indications: Vitamin and/or Mineral Deficiency      O2 (OXYGEN) Inhale 2 L/min Every Night. Indications: Oxygen Therapy       Allergies   Allergen Reactions    Amlodipine Besylate Swelling    Aspirin GI Intolerance     Caused bleeding ulcers    Bactrim [Sulfamethoxazole-Trimethoprim] Nausea And Vomiting    Erythromycin Unknown (See Comments)     Patient does not recall type of reaction.    Levaquin [Levofloxacin] Unknown (See Comments)     Nausea      Nitrofurantoin Nausea Only and Nausea And Vomiting    Ramipril Other (See Comments)     Cough       Objective    Objective     Vital Signs  Temp:  [97.7 °F (36.5 °C)-97.9 °F (36.6 °C)] 97.7 °F (36.5 °C)  Heart Rate:  [70-91] 72  Resp:  [18] 18  BP: (115-159)/(73-95) 159/81  SpO2:  [91 %-97 %] 95 %  on   ;   Device (Oxygen Therapy): room air  Body mass index is 24.21 kg/m².    Physical Exam  Constitutional:       General: She is not in acute distress.     Appearance: Normal appearance. She is not toxic-appearing.   Cardiovascular:      Rate and Rhythm: Normal rate. Rhythm irregular.   Pulmonary:      Effort:  Pulmonary effort is normal.   Abdominal:      General: Abdomen is flat. Bowel sounds are normal.      Palpations: Abdomen is soft.   Musculoskeletal:      Comments: L hip TTP   Skin:     General: Skin is warm.   Neurological:      General: No focal deficit present.      Mental Status: She is alert and oriented to person, place, and time.   Psychiatric:         Mood and Affect: Mood normal.         Behavior: Behavior normal.         Results Review:    I have personally reviewed:  [x]  Laboratory  [x]  Old records  [x]  Radiology   [x]  EKG/Telemetry   []  Microbiology    [x]  Cardiology/Vascular   []  Pathology     Lab Results (last 24 hours)       Procedure Component Value Units Date/Time    CBC & Differential [[id number]]  (Abnormal) Collected: 08/14/24 1532    Specimen: Blood Updated: 08/14/24 1539    Narrative:      The following orders were created for panel order CBC & Differential.  Procedure                               Abnormality         Status                     ---------                               -----------         ------                     CBC Auto Differential[[id number]]        Abnormal            Final result                 Please view results for these tests on the individual orders.    Comprehensive Metabolic Panel [[id number]]  (Abnormal) Collected: 08/14/24 1532    Specimen: Blood Updated: 08/14/24 1556     Glucose 185 mg/dL      BUN 18 mg/dL      Creatinine 1.15 mg/dL      Sodium 136 mmol/L      Potassium 4.7 mmol/L      Comment: Slight hemolysis detected by analyzer. Result may be falsely elevated.        Chloride 100 mmol/L      CO2 26.9 mmol/L      Calcium 9.1 mg/dL      Total Protein 6.8 g/dL      Albumin 4.0 g/dL      ALT (SGPT) 28 U/L      AST (SGOT) 32 U/L      Alkaline Phosphatase 90 U/L      Total Bilirubin 0.5 mg/dL      Globulin 2.8 gm/dL      A/G Ratio 1.4 g/dL      BUN/Creatinine Ratio 15.7     Anion Gap 9.1 mmol/L      eGFR 44.2 mL/min/1.73     Narrative:      GFR Normal  >60  Chronic Kidney Disease <60  Kidney Failure <15    The GFR formula is only valid for adults with stable renal function between ages 18 and 70.    Digoxin Level [[id number]]  (Normal) Collected: 08/14/24 1532    Specimen: Blood Updated: 08/14/24 1722     Digoxin 0.60 ng/mL     BNP [[id number]]  (Abnormal) Collected: 08/14/24 1532    Specimen: Blood Updated: 08/14/24 1601     proBNP 5,391.0 pg/mL     Narrative:      This assay is used as an aid in the diagnosis of individuals suspected of having heart failure. It can be used as an aid in the diagnosis of acute decompensated heart failure (ADHF) in patients presenting with signs and symptoms of ADHF to the emergency department (ED). In addition, NT-proBNP of <300 pg/mL indicates ADHF is not likely.    Age Range Result Interpretation  NT-proBNP Concentration (pg/mL:      <50             Positive            >450                   Gray                 300-450                    Negative             <300    50-75           Positive            >900                  Gray                300-900                  Negative            <300      >75             Positive            >1800                  Gray                300-1800                  Negative            <300    CBC Auto Differential [[id number]]  (Abnormal) Collected: 08/14/24 1532    Specimen: Blood Updated: 08/14/24 1539     WBC 11.29 10*3/mm3      RBC 3.15 10*6/mm3      Hemoglobin 10.6 g/dL      Hematocrit 33.9 %      .6 fL      MCH 33.7 pg      MCHC 31.3 g/dL      RDW 15.4 %      RDW-SD 59.9 fl      MPV 10.4 fL      Platelets 248 10*3/mm3      Neutrophil % 82.6 %      Lymphocyte % 10.9 %      Monocyte % 4.3 %      Eosinophil % 0.4 %      Basophil % 0.3 %      Immature Grans % 1.5 %      Neutrophils, Absolute 9.34 10*3/mm3      Lymphocytes, Absolute 1.23 10*3/mm3      Monocytes, Absolute 0.48 10*3/mm3      Eosinophils, Absolute 0.04 10*3/mm3      Basophils, Absolute 0.03 10*3/mm3      Immature Grans,  Absolute 0.17 10*3/mm3     Urinalysis With Culture If Indicated - Urine, Clean Catch [[id number]]  (Abnormal) Collected: 08/14/24 1839    Specimen: Urine, Clean Catch Updated: 08/14/24 1847     Color, UA Yellow     Appearance, UA Clear     pH, UA 7.0     Specific Gravity, UA 1.010     Glucose, UA Negative     Ketones, UA Negative     Bilirubin, UA Negative     Blood, UA Trace     Protein, UA Negative     Leuk Esterase, UA Negative     Nitrite, UA Negative     Urobilinogen, UA 0.2 E.U./dL    Narrative:      In absence of clinical symptoms, the presence of pyuria, bacteria, and/or nitrites on the urinalysis result does not correlate with infection.    Urinalysis, Microscopic Only - Urine, Clean Catch [[id number]] Collected: 08/14/24 1839    Specimen: Urine, Clean Catch Updated: 08/14/24 2149     RBC, UA 0-2 /HPF      WBC, UA 0-2 /HPF      Comment: Urine culture not indicated.        Bacteria, UA None Seen /HPF      Squamous Epithelial Cells, UA 0-2 /HPF      Hyaline Casts, UA None Seen /LPF      Methodology Automated Microscopy    Lizella Urine Culture Tube - Urine, Clean Catch [[id number]] Collected: 08/14/24 1839    Specimen: Urine, Clean Catch Updated: 08/14/24 2202     Extra Tube Hold for add-ons.     Comment: Auto resulted.               Imaging Results (Last 24 Hours)       Procedure Component Value Units Date/Time    CT Lower Extremity Left Without Contrast [[id number]] Collected: 08/14/24 1758     Updated: 08/14/24 1810    Narrative:      CT LOWER EXTREMITY LEFT WO CONTRAST-     DATE OF EXAM: 8/14/2024 5:42 PM     INDICATION: Left hip/greater trochanter fracture. Status post fall.     COMPARISON: Radiographs 8/14/2024. CT abdomen pelvis 1/4/2019.     TECHNIQUE: Multiple contiguous axial images were acquired through the  left hip without the intravenous administration of contrast. Reformatted  coronal and sagittal sequences were also reviewed. Radiation dose  reduction techniques were utilized, including automated  exposure control  and exposure modulation based on body size.     FINDINGS:  Nondisplaced fracture of the posterior left greater trochanter.     Probable old healed fracture deformity of the left puboacetabular  junction and left inferior pubic ramus. Probable old healed fracture  deformity of the first coccygeal segment.     Mild patchy soft tissue edema lateral to the left hip. Similar-appearing  2 cm left adnexal cyst. No solid soft tissue mass. No free fluid in the  pelvis. Partially imaged colonic diverticula. No abnormal bursal fluid  collection. No pathologically enlarged lymph nodes. Mild to moderate  calcified atherosclerotic disease.     Mild to moderate left hip joint DJD with diffuse chondrocalcinosis.  Moderate to severe DJD of the pubic symphysis with chondrocalcinosis. No  significant joint effusion.     Partial calcification of the proximal hamstring tendons, likely  chondrocalcinosis. No noncontrast CT evidence of a full-thickness tendon  tear. Mild diffuse muscular fatty atrophy.       Impression:         1. Nondisplaced fracture of the posterior left greater trochanter.  2. Probable old healed fracture deformities of the left puboacetabular  junction, left inferior pubic ramus, and the coccyx.  3. Multifocal DJD with chondrocalcinosis indicating CPPD.     This report was finalized on 8/14/2024 6:07 PM by Scout Sandoval MD on  Workstation: [id number]       XR Chest 1 View [[id number]] Collected: 08/14/24 1534     Updated: 08/14/24 1542    Narrative:      XR FEMUR 2 VW LEFT-, XR HIP W OR WO PELVIS 2-3 VIEW LEFT-, XR CHEST 1  VW-     INDICATIONS: Congestive heart failure, hip pain, trauma 2 days ago.     TECHNIQUE: 4 VIEWS OF THE LEFT FEMUR, FRONTAL VIEW OF THE CHEST, FRONTAL  VIEW OF THE PELVIS, 2 VIEWS OF THE LEFT HIP     COMPARISON: 7/10/2024     FINDINGS:     Chest x-ray: The heart is enlarged. Pulm vasculature is unremarkable.  Aorta is tortuous, calcified. Linear likely atelectasis or scarring  at  the right midlung. No focal pulmonary consolidation, pleural effusion,  or pneumothorax. Chronic deformity of the left clavicle. No acute  fracture is identified.     Pelvis, left hip, left femur: The pelvic ring appears intact. A  nondisplaced fracture involving the left greater trochanter is apparent,  the extent of which may be better characterized with cross-sectional  imaging. No other fractures are identified. No dislocation. Degenerative  change is seen at the symphysis pubis. Medial left knee arthroplasty  hardware appears intact. Arterial calcifications are present.       Impression:         As described.           This report was finalized on 8/14/2024 3:39 PM by Dr. Steven Garza M.D on Workstation: [id number]       XR Hip With or Without Pelvis 2 - 3 View Left [[id number]] Collected: 08/14/24 1534     Updated: 08/14/24 1542    Narrative:      XR FEMUR 2 VW LEFT-, XR HIP W OR WO PELVIS 2-3 VIEW LEFT-, XR CHEST 1  VW-     INDICATIONS: Congestive heart failure, hip pain, trauma 2 days ago.     TECHNIQUE: 4 VIEWS OF THE LEFT FEMUR, FRONTAL VIEW OF THE CHEST, FRONTAL  VIEW OF THE PELVIS, 2 VIEWS OF THE LEFT HIP     COMPARISON: 7/10/2024     FINDINGS:     Chest x-ray: The heart is enlarged. Pulm vasculature is unremarkable.  Aorta is tortuous, calcified. Linear likely atelectasis or scarring at  the right midlung. No focal pulmonary consolidation, pleural effusion,  or pneumothorax. Chronic deformity of the left clavicle. No acute  fracture is identified.     Pelvis, left hip, left femur: The pelvic ring appears intact. A  nondisplaced fracture involving the left greater trochanter is apparent,  the extent of which may be better characterized with cross-sectional  imaging. No other fractures are identified. No dislocation. Degenerative  change is seen at the symphysis pubis. Medial left knee arthroplasty  hardware appears intact. Arterial calcifications are present.       Impression:         As  described.           This report was finalized on 8/14/2024 3:39 PM by Dr. Steven Garza M.D on Workstation: [id number]       XR Femur 2 View Left [[id number]] Collected: 08/14/24 1534     Updated: 08/14/24 1542    Narrative:      XR FEMUR 2 VW LEFT-, XR HIP W OR WO PELVIS 2-3 VIEW LEFT-, XR CHEST 1  VW-     INDICATIONS: Congestive heart failure, hip pain, trauma 2 days ago.     TECHNIQUE: 4 VIEWS OF THE LEFT FEMUR, FRONTAL VIEW OF THE CHEST, FRONTAL  VIEW OF THE PELVIS, 2 VIEWS OF THE LEFT HIP     COMPARISON: 7/10/2024     FINDINGS:     Chest x-ray: The heart is enlarged. Pulm vasculature is unremarkable.  Aorta is tortuous, calcified. Linear likely atelectasis or scarring at  the right midlung. No focal pulmonary consolidation, pleural effusion,  or pneumothorax. Chronic deformity of the left clavicle. No acute  fracture is identified.     Pelvis, left hip, left femur: The pelvic ring appears intact. A  nondisplaced fracture involving the left greater trochanter is apparent,  the extent of which may be better characterized with cross-sectional  imaging. No other fractures are identified. No dislocation. Degenerative  change is seen at the symphysis pubis. Medial left knee arthroplasty  hardware appears intact. Arterial calcifications are present.       Impression:         As described.           This report was finalized on 8/14/2024 3:39 PM by Dr. Steven Garza M.D on Workstation: [id number]               Results for orders placed during the hospital encounter of 10/04/23    Adult Transthoracic Echo Complete W/ Cont if Necessary Per Protocol    Interpretation Summary    Left ventricular systolic function is normal. Calculated left ventricular EF = 56%    Normal right ventricular cavity size and systolic function noted.    The left atrial cavity is severely dilated.    The right atrial cavity is severely dilated.    Mild to moderate mitral valve regurgitation is present.    Moderate tricuspid valve  regurgitation is present.    Calculated right ventricular systolic pressure from tricuspid regurgitation is 49 mmHg.    There is a small (<1cm) circumferential pericardial effusion. There is no evidence of cardiac tamponade.      ECG 12 Lead   ED Interpretation   Abnormal   Ladarius Carmichael MD     8/14/2024  5:39 PM   ECG 12 Lead         Date/Time: 8/14/2024 5:05 PM      Performed by: Ladarius Carmichael MD   Authorized by: Ladarius Carmichael MD  Interpreted by ED physician   Rhythm: atrial fibrillation   Rate: normal   BPM: 85   QRS axis: normal   Conduction: conduction normal   ST Depression: V4, V5 and V6   T depression: V3, V4, V6 and V5   Other: no other findings   Clinical impression: abnormal ECG and dysrhythmia - atrial      ECG 12 Lead Rhythm Change   Final Result   HEART RATE=85  bpm   RR Cibdenkn=644  ms   NE Interval=  ms   P Horizontal Axis=  deg   P Front Axis=  deg   QRSD Interval=74  ms   QT Ncqnaykk=789  ms   FLvT=716  ms   QRS Axis=Invalid  deg   T Wave Axis=207  deg   - ABNORMAL ECG -   Atrial fibrillation   RSR' in V1 or V2, probably normal variant   Repol abnrm, severe global ischemia (LM/MVD)   When compared with ECG of 10-Jul-2024 12:39:35,   No significant change   Electronically Signed By: Jorge Vela ([facility]) 2024-08-14 16:21:42   Date and Time of Study:2024-08-14 15:39:28      Telemetry Scan   Final Result           Assessment/Plan     Active Hospital Problems    Diagnosis  POA    **Fracture of greater trochanter of left femur [S72.112A]  Yes    CHF (congestive heart failure) [I50.9]  Yes    Chronic diastolic CHF (congestive heart failure) [I50.32]  Yes    Permanent atrial fibrillation [I48.21]  Yes    Primary hypertension [I10]  Yes    Nonobstructive atherosclerosis of coronary artery [I25.10]  Yes    Mitral valve insufficiency [I34.0]  Yes      Resolved Hospital Problems   No resolved problems to display.       Ms. Rizzo is a 89 y.o. female with HFpEF, HTN, A-fib presenting with mechanical  fall and hip pain.      Left greater trochanter fracture, nondisplaced  Mechanical fall  -Seen on CT  -Orthopedic surgery consult  -Norco, Dilaudid for breakthrough; bowel regimen  -PT/OT post op     Permanent Afib  -RC: Coreg  -AC: apixaban 2.5mg; ON HOLD  -follows with [facility]; most recent echo 10/20/2023 with mild to moderate MR, moderate TR, moderate pulm hypertension-recommendation at most recent visit 7/30/2024 without need to repeat.  She has nonobstructive CAD.  No angina no further testing was recommended at that time.     Chronic diastolic heart failure  -BNP 5K OA  -CXR unremarkable  -received dose of IV Lasix at [facility]    COPD (2L nocturnal O2)  -home inhalers    Leukocytosis  -Likely reactive    I discussed the patient's findings and my recommendations with patient and ED provider.    VTE Prophylaxis - SCDs.  Code Status - DNR.       Lev Melgar MD  [facility]  08/14/24  23:38 EDT

## 2024-08-15 NOTE — CASE MANAGEMENT/SOCIAL WORK
Continued Stay Note  [facility]     Patient Name: Agnieszka Rizzo  MRN: [id number]  Today's Date: 8/15/2024    Admit Date: 8/14/2024    Plan: TBD pending Ortho consult &PT eval, pt from Independent Living @ [facility]   Discharge Plan       Row Name 08/15/24 7389       Plan    Plan TBD pending Ortho consult &PT eval, pt from Independent Living @ [facility]    Plan Comments CCP SW Lea/[facility] who confirms pt’s level of care is Independent Living. Reports there are no requirements to return; pt can have PT & OT @ [facility] & pt can be an assist x 2. CCP verbalized understanding & will update Lea throughout pt’s hospitalization. DOLORES Marcial/CCP      Row Name 08/15/24 1300       Plan    Plan --    Provided Post Acute Provider List? --    Post Acute Provider List --    Provided Post Acute Provider Quality & Resource List? --    Post Acute Provider Quality and Resource List --    Delivered To --    Support Person --    Method of Delivery --    Plan Comments --      Row Name 08/15/24 6022       Plan    Plan TBD pending Ortho consult & PT eval, pt from [facility]    Patient/Family in Agreement with Plan yes    Provided Post Acute Provider List? Yes    Post Acute Provider List Nursing Home    Provided Post Acute Provider Quality & Resource List? Yes    Post Acute Provider Quality and Resource List Nursing Home    Delivered To Patient;Support Person    Support Person Michelle    Method of Delivery Other (comment);In person  Left at pt's bedside table    Plan Comments CCP met c/ pt at bedside, introduced self, & role of CCP. Face sheet information & Rx verified. Pt lives in [facility]. Pt does not drive. Pt is independent with ADLs. Pt reports use of the following DME: oxygen only QHS, walker, & manual wheelchair PRN - only when staff or family available to push her. Pt reports she has a living will. Pt declines Meds 2 Beds. Pt denies issues affording or managing medications. Pt reports she has not used  home health or been to a SNF rehab facility in the past. Pt gave permission for CCP to SW daughter to confirm above information & provide update. CCP  pt’s daughter, Michelle, via phone, introduced self, & role of CCP. Michelle report’s pt lives in Independent Living at [facility] but they do provide her assistance. CCP updated DC plan is pending ortho consult & PT eval / recs. Michelle reports pt’s plan is non-surgical management. CCP discussed DC option of home c/ HH vs SNF. CCP discussed Medicare guidelines, Road to Recovery, & SNF list. Michelle verbalized understanding. Michelle reports pt has been to [place] before & adamantly does not want to go back there. Michelle reports she will review Road to Recovery, SNF list, & update CCP c/ choices for SNF. CCP left  c/ Lea/[facility] to confirm pt’s level of care & requirements for pt to return. DC plan pending ortho recs & PT eval. Anticipate DC to SNF at this time. DOLORES Marcial/CCP                   Discharge Codes    No documentation.                 Expected Discharge Date and Time       Expected Discharge Date Expected Discharge Time    Aug 16, 2024               Dayanara Demarco RN

## 2024-08-15 NOTE — PLAN OF CARE
Goal Outcome Evaluation:  Plan of Care Reviewed With: patient   89 y/o F, admitted for closed nondisplaced fx of left femur. Pt resting w/LLE elevated from pressure. Pt denies pain at rest, pain controlled with as needed pain meds. AAOx4, repetition utilized. Pt on oxygen 2lpm via nc, she uses at HS at assisted living. Hx of controlled afib, anticoagulated on Eliquis. Eliquis on hold at this time. 60-70's , afib to monitor, VSS. AM labs pending, NPO w/ sips. Ortho Sx and PT consults pending. Plan of care ongoing.     Progress: no change

## 2024-08-15 NOTE — SIGNIFICANT NOTE
08/15/24 1015   OTHER   Discipline physical therapist   Rehab Time/Intention   Session Not Performed other (see comments)  (Patient presented following a fall resulting in hip fx. Awaiting ortho consult for WB status/surgical plan. PT will f/u.)   Recommendation   PT - Next Appointment 08/16/24

## 2024-08-15 NOTE — CONSULTS
Orthopaedic Surgery  Consult Note  Dr. HAYDEE Hutchins II  [phone]    HPI:  Patient is a 89 y.o. Not  or  female who presents with Complaints of left hip pain after a fall.  She was seen at an outside facility and transferred here yesterday evening.  The on-call provider yesterday was not aware of the transfer.  I was consulted earlier today.  She was able to put a little bit of weight on her left leg and mobilize to the chair.  She does complain of sharp pain in the left hip worse with activity.  A CT scan demonstrated a nondisplaced greater trochanter fracture.    MEDICAL HISTORY  Past Medical History:   Diagnosis Date   • Aspergillus    • Asthma    • Atrial fibrillation     chronic   • Atrial flutter    • Bronchiectasis    • C. difficile diarrhea 03/11/2017   • CAD (coronary artery disease)     nonobstructive   • Chronic diastolic CHF (congestive heart failure)    • Clinical diagnosis of COVID-19 01/04/2022   • Colitis    • Cough    • Cryoglobulinemia    • Dyspnea on exertion    • Exposure to hepatitis A 04/20/2018   • Fall    • Hyperlipidemia    • Hypertension    • Hyponatremia    • Hypoxia    • Infectious viral hepatitis     AGE 13   • Left shoulder pain    • Leg swelling    • Lesion of lung    • Malignant hyperthermia due to anesthesia    • Mild tricuspid regurgitation    • MR (mitral regurgitation)     mild   • MVP (mitral valve prolapse)    • Permanent atrial fibrillation    • Pneumonia of both lungs due to infectious organism 05/31/2017    Followed BY ID for MAC     • Pneumonia with the fungal infection aspergillosis 01/06/2017   • Pneumothorax    • SOB (shortness of breath)    • Syncope 07/2024   • UTI (urinary tract infection)    • Wheeze     mild     Past Surgical History:   Procedure Laterality Date   • BRONCHOSCOPY N/A 11/12/2016    Procedure: BRONCHOSCOPY WITH FLUORO, BRUSHINGS, BAL, AND BIOPSIES;  Surgeon: Rogelio Tucker MD;  Location: [facility] ENDOSCOPY;  Service:    • BRONCHOSCOPY  Bilateral 06/03/2017    Procedure: BRONCHOSCOPY with BAL ;  Surgeon: Sung King MD;  [facility] [facility];  Service:    • BRONCHOSCOPY N/A 12/17/2019    Procedure: BRONCHOSCOPY WITH WASHINGS;  Surgeon: Rogelio Tucker MD;  [facility];  Service: Pulmonary   • BRONCHOSCOPY N/A 07/15/2022    Procedure: BRONCHOSCOPY;  Surgeon: Rogelio Tucker MD;  [facility];  Service: Pulmonary;  Laterality: N/A;  Pre: Pneumonia  Post: Pneumonia   • BRONCHOSCOPY N/A 10/2/2023    Procedure: BRONCHOSCOPY WITH BRONCHIAL AVEOLAR LAVAGE;  Surgeon: Rogelio Tucker MD;  Location: [facility];  Service: Pulmonary;  Laterality: N/A;  PRE:BRONCHIECTASIS /   POST: SAME   • CATARACT EXTRACTION EXTRACAPSULAR W/ INTRAOCULAR LENS IMPLANTATION     • COLONOSCOPY      2013   • D & C WITH SUCTION     • HYSTERECTOMY     • KNEE ARTHROSCOPY Left     Partial     Prior to Admission medications    Medication Sig Start Date End Date Taking? Authorizing Provider   azithromycin (ZITHROMAX) 250 MG tablet Take 1 tablet by mouth Daily.   Yes Provider, MD Jane   bumetanide (BUMEX) 2 MG tablet Take 1 tablet by mouth Daily. Indications: Cardiac Failure, Edema 7/13/24  Yes Prince Hubbard MD   carvedilol (COREG) 3.125 MG tablet Take 1 tablet by mouth 2 (Two) Times a Day With Meals. Indications: Cardiac Failure, High Blood Pressure Disorder 12/28/23  Yes Marcie Robbins MD   citalopram (CeleXA) 20 MG tablet TAKE 1 TABLET BY MOUTH DAILY FOR MAJOR DEPRESSION 5/6/24  Yes Matt Rose MD   digoxin (LANOXIN) 125 MCG tablet Take 1 tablet by mouth Every Other Day. Indications: Atrial Fibrillation, Cardiac Failure 7/13/24  Yes Prince Hubbard MD   Eliquis 2.5 MG tablet tablet TAKE 1 TABLET EVERY 12 HOURS, FULL ANTICOAGULATION 8/7/24  Yes Marcie Robbins MD   FeroSul 325 (65 Fe) MG tablet TAKE ONE TABLET BY MOUTH DAILY WITH BREAKFAST  Patient taking differently: Take 1 tablet by mouth Daily. 10/19/22  Yes Matt Rose  MD AKASH   fexofenadine (ALLEGRA) 180 MG tablet Take 1 tablet by mouth Daily. Indications: Hayfever 4/10/23  Yes Jane Mcclellan MD   fluticasone-salmeterol (ADVAIR HFA) 115-21 MCG/ACT inhaler Inhale 2 puffs 2 (Two) Times a Day. Indications: Asthma   Yes Jane Mcclellan MD   guaiFENesin (MUCINEX) 600 MG 12 hr tablet Take 1 tablet by mouth 2 (Two) Times a Day. Indications: Cough   Yes Jane Mcclellan MD   ipratropium-albuterol (DUO-NEB) 0.5-2.5 mg/3 ml nebulizer Take 3 mL by nebulization 2 (Two) Times a Day. Indications: Asthma 8/1/22  Yes Jane Mcclellan MD   Lactobacillus (FLORAJEN ACIDOPHILUS) capsule Take 1 tablet by mouth Daily. Indications: Heart failure, status change, with med/diet change   Yes Jane Mcclellan MD   potassium chloride (K-DUR,KLOR-CON) 20 MEQ CR tablet Take 1 tablet by mouth Daily. 8/21/23  Yes Marcia Joel APRN   predniSONE (DELTASONE) 10 MG tablet Take 1 tablet by mouth Daily. 12/14/23  Yes Jane Mcclellan MD   sodium chloride 7 % nebulizer solution nebulizer solution Take 4 mL by nebulization 2 (Two) Times a Day. 12/2/23  Yes Karel Porras MD   acetaminophen (TYLENOL) 325 MG tablet Take 2 tablets by mouth Every 4 (Four) Hours As Needed for Moderate Pain. Indications: Pain 7/13/21   Jane Mcclellan MD   albuterol sulfate  (90 Base) MCG/ACT inhaler Inhale 2 puffs Every 6 (Six) Hours As Needed for Wheezing or Shortness of Air. 4/20/23   Marcie Robbins MD   benzonatate (TESSALON) 200 MG capsule Take 1 capsule by mouth 3 (Three) Times a Day As Needed for Cough. Indications: Cough 4/10/23   Jane Mcclellan MD   Multiple Vitamins-Minerals (PRESERVISION AREDS PO) Take 1 tablet by mouth 2 (Two) Times a Day. Indications: Vitamin and/or Mineral Deficiency 8/12/22   Jane Mcclellan MD   O2 (OXYGEN) Inhale 2 L/min Every Night. Indications: Oxygen Therapy 1/1/23   Provider, Historical, MD     Allergies   Allergen Reactions   •  "Amlodipine Besylate Swelling   • Aspirin GI Intolerance     Caused bleeding ulcers   • Bactrim [Sulfamethoxazole-Trimethoprim] Nausea And Vomiting   • Erythromycin Unknown (See Comments)     Patient does not recall type of reaction.   • Levaquin [Levofloxacin] Unknown (See Comments)     Nausea     • Nitrofurantoin Nausea Only and Nausea And Vomiting   • Ramipril Other (See Comments)     Cough     Most Recent Immunizations   Administered Date(s) Administered   • COVID-19 (PFIZER) Purple Cap Monovalent 09/09/2021   • Flu Vaccine Intradermal Quad 18-64YR 10/01/2016   • Fluad Quad 65+ 09/16/2020   • Fluzone High-Dose 65+YRS 10/01/2018   • Pneumococcal Conjugate 13-Valent (PCV13) 10/01/2016   • Pneumococcal Polysaccharide (PPSV23) 01/01/2000   • Shingrix 08/01/2018   • Tdap 07/12/2021     Social History   History  Tobacco Use  •  Smoking status:  Never  •  Smokeless tobacco:  Never  •  Tobacco comments:      caffiene daily  Substance Use Topics  •  Alcohol use:  Not Currently      Alcohol/week:  2.0 standard drinks of alcohol      Types:  1 Glasses of wine, 1 Shots of liquor per week       Social History   History  Substance and Sexual Activity  Drug Use  No        VITALS: /89 (BP Location: Left arm, Patient Position: Lying)   Pulse 85   Temp 97.7 °F (36.5 °C) (Oral)   Resp 18   Ht 160 cm (63\")   Wt 62 kg (136 lb 11 oz)   SpO2 91%   BMI 24.21 kg/m²  Body mass index is 24.21 kg/m².    PHYSICAL EXAM:   CONSTITUTIONAL: No acute distress  LUNGS: Equal chest rise, no shortness of air  CARDIOVASCULAR: palpable peripheral pulses  SKIN: no skin lesions in the area examined  LYMPH: no lymphadenopathy in the area examined  Musculoskeletal: Pain with logroll of left hip.  Normal neurovascular exam.  Tenderness palpation at the hip    RADIOLOGY REVIEW:   CT Lower Extremity Left Without Contrast    Result Date: 8/14/2024   1. Nondisplaced fracture of the posterior left greater trochanter. 2. Probable old healed fracture " deformities of the left puboacetabular junction, left inferior pubic ramus, and the coccyx. 3. Multifocal DJD with chondrocalcinosis indicating CPPD.  This report was finalized on 8/14/2024 6:07 PM by Scout Sandoval MD on Workstation: [id number]      XR Chest 1 View    Result Date: 8/14/2024   As described.    This report was finalized on 8/14/2024 3:39 PM by Dr. Steven Garza M.D on Workstation: [id number]      XR Hip With or Without Pelvis 2 - 3 View Left    Result Date: 8/14/2024   As described.    This report was finalized on 8/14/2024 3:39 PM by Dr. Steven Garza M.D on Workstation: [id number]      XR Femur 2 View Left    Result Date: 8/14/2024   As described.    This report was finalized on 8/14/2024 3:39 PM by Dr. Steven Garza M.D on Workstation: [id number]        LABS:   Results for the past 24 hours:   Recent Results (from the past 24 hour(s))   Urinalysis With Culture If Indicated - Urine, Clean Catch    Collection Time: 08/14/24  6:39 PM    Specimen: Urine, Clean Catch   Result Value Ref Range    Color, UA Yellow Yellow, Straw    Appearance, UA Clear Clear    pH, UA 7.0 5.0 - 8.0    Specific Gravity, UA 1.010 1.005 - 1.030    Glucose, UA Negative Negative    Ketones, UA Negative Negative    Bilirubin, UA Negative Negative    Blood, UA Trace (A) Negative    Protein, UA Negative Negative    Leuk Esterase, UA Negative Negative    Nitrite, UA Negative Negative    Urobilinogen, UA 0.2 E.U./dL 0.2 - 1.0 E.U./dL   Urinalysis, Microscopic Only - Urine, Clean Catch    Collection Time: 08/14/24  6:39 PM    Specimen: Urine, Clean Catch   Result Value Ref Range    RBC, UA 0-2 None Seen, 0-2 /HPF    WBC, UA 0-2 None Seen, 0-2 /HPF    Bacteria, UA None Seen None Seen /HPF    Squamous Epithelial Cells, UA 0-2 None Seen, 0-2 /HPF    Hyaline Casts, UA None Seen None Seen /LPF    Methodology Automated Microscopy    [place] Urine Culture Tube - Urine, Clean Catch    Collection Time: 08/14/24  6:39 PM    Specimen:  "Urine, Clean Catch   Result Value Ref Range    Extra Tube Hold for add-ons.    Basic Metabolic Panel    Collection Time: 08/15/24  3:42 AM    Specimen: Blood   Result Value Ref Range    Glucose 100 (H) 65 - 99 mg/dL    BUN 13 8 - 23 mg/dL    Creatinine 0.84 0.57 - 1.00 mg/dL    Sodium 143 136 - 145 mmol/L    Potassium 3.5 3.5 - 5.2 mmol/L    Chloride 104 98 - 107 mmol/L    CO2 27.7 22.0 - 29.0 mmol/L    Calcium 9.2 8.2 - 9.6 mg/dL    BUN/Creatinine Ratio 15.5 7.0 - 25.0    Anion Gap 11.3 5.0 - 15.0 mmol/L    eGFR 64.5 >60.0 mL/min/1.73   CBC (No Diff)    Collection Time: 08/15/24  3:42 AM    Specimen: Blood   Result Value Ref Range    WBC 10.33 3.40 - 10.80 10*3/mm3    RBC 3.43 (L) 3.77 - 5.28 10*6/mm3    Hemoglobin 11.0 (L) 12.0 - 15.9 g/dL    Hematocrit 34.1 34.0 - 46.6 %    MCV 99.4 (H) 79.0 - 97.0 fL    MCH 32.1 26.6 - 33.0 pg    MCHC 32.3 31.5 - 35.7 g/dL    RDW 13.7 12.3 - 15.4 %    RDW-SD 49.4 37.0 - 54.0 fl    MPV 9.8 6.0 - 12.0 fL    Platelets 291 140 - 450 10*3/mm3       IMPRESSION:  Patient is a 89 y.o. Not  or  female with Nondisplaced left greater trochanter fracture    PLAN:   Admited to: Lev Melgar MD  Recommend conservative treatment with protected weightbearing on a walker for 6 weeks.  She will follow-up in 6 weeks with repeat x-ray and evaluation of the hip.    R \"Roel\"Cuong PEARSON MD  Orthopaedic Surgery  [facility]  [phone] - [place] Office  [phone] - [place] Office            "

## 2024-08-15 NOTE — PLAN OF CARE
Goal Outcome Evaluation:           Progress: no change  Outcome Evaluation: A&Ox3, disoriented to time. Frequently forgetful. VSS, on 2L NC. No complaints of pain. Ortho consult called, ortho has not yet rounded. Pt and family do not want surgery, if indicated. Pt on bedrest, voids per purewick. Poor historian cannot recall last BM. Digoxin restarting tomorrow for A. fib.  Educated on safety. Plans to DC back to [facility] when ready

## 2024-08-15 NOTE — CASE MANAGEMENT/SOCIAL WORK
Discharge Planning Assessment  [facility]     Patient Name: Agnieszka Rizzo  MRN: [id number]  Today's Date: 8/15/2024    Admit Date: 8/14/2024    Plan: TBD pending Ortho consult, PT gwyn, from [facility]   Discharge Needs Assessment       Row Name 08/15/24 1309       Living Environment    People in Home facility resident    Name(s) of People in Home [facility]    Current Living Arrangements assisted living facility;independent living facility  Awaiting facility to return call to confirm pt's current level of care & requirements to return    Potentially Unsafe Housing Conditions none    In the past 12 months has the electric, gas, oil, or water company threatened to shut off services in your home? No    Primary Care Provided by self    Provides Primary Care For no one, unable/limited ability to care for self    Family Caregiver if Needed child(graciela), adult    Able to Return to Prior Arrangements no  Awaiting ortho consult, PT eval, & facility to return call c/ pt's level of care & requirements to return       Resource/Environmental Concerns    Resource/Environmental Concerns none    Transportation Concerns none       Transportation Needs    In the past 12 months, has lack of transportation kept you from medical appointments or from getting medications? no    In the past 12 months, has lack of transportation kept you from meetings, work, or from getting things needed for daily living? No       Food Insecurity    Within the past 12 months, you worried that your food would run out before you got the money to buy more. Never true    Within the past 12 months, the food you bought just didn't last and you didn't have money to get more. Never true       Transition Planning    Patient/Family Anticipates Transition to inpatient rehabilitation facility    Patient/Family Anticipated Services at Transition skilled nursing    Transportation Anticipated family or friend will provide;health plan transportation       Discharge  Needs Assessment    Readmission Within the Last 30 Days no previous admission in last 30 days    Current Outpatient/Agency/Support Group assisted living facility    Equipment Currently Used at Home oxygen;walker, standard    Anticipated Changes Related to Illness none    Provided Post Acute Provider List? Yes    Post Acute Provider List Nursing Home    Provided Post Acute Provider Quality & Resource List? Yes    Post Acute Provider Quality and Resource List Nursing Home    Delivered To Patient;Support Person    Support Person Michelle    Method of Delivery In person;Other (comment)  Left at pt's bedside table                   Discharge Plan       Row Name 08/15/24 1309       Plan    Plan TBD pending Ortho consult, PT eval, from [facility]    Plan Comments CCP met c/ pt at bedside, introduced self, & role of CCP. Face sheet information & Rx verified. [facility] in [facility]. Pt does not drive. Pt is independent with ADLs. Pt reports use of the following DME: oxygen only QHS, walker, & manual wheelchair PRN - only when staff or family available to push her. Pt reports she has a living will. Pt declines Meds 2 Beds. Pt denies issues affording or managing medications. Pt reports she has not used home health or been to a SNF rehab facility in the past. Pt gave permission for CCP to SW daughter to confirm above information & provide update. CCP SW pt’s daughter, Michelle, via phone, introduced self, & role of CCP. Michelle report’s pt lives in [facility] but they do provide her assistance. CCP updated DC plan is pending ortho consult & PT eval / recs. Michelle reports pt’s plan is non-surgical management. CCP discussed DC option of home c/ HH vs SNF. CCP discussed Medicare guidelines, Road to Recovery, & SNF list. Michelle verbalized understanding. Michelle reports pt has been to [place] before & adamantly does not want to go back there. Michelle reports she will review Road to Recovery, SNF list,  & update CCP c/ choices for SNF. CCP left VM c/ Lea/[facility] to confirm pt’s level of care & requirements for pt to return. DC plan pending ortho recs & PT eval. Anticipate DC to SNF at this time. Aggie RN/CCP               Continued Care and Services - Admitted Since 8/14/2024    No active coordination exists for this encounter.       Selected Continued Care - Prior Encounters Includes continued care and service providers with selected services from prior encounters from 5/16/2024 to 8/15/2024      Discharged on 7/13/2024 Admission date: 7/10/2024 - Discharge disposition: Home-Health Care [facility]      Home Medical Care       Service Provider Selected Services Address Phone Fax Patient Preferred    [facility] [address], [address], [facility] [phone] --                          Expected Discharge Date and Time       Expected Discharge Date Expected Discharge Time    Aug 16, 2024            Demographic Summary    No documentation.                  Functional Status       Row Name 08/15/24 1309       Functional Status    Usual Activity Tolerance good    Current Activity Tolerance good       Physical Activity    On average, how many days per week do you engage in moderate to strenuous exercise (like a brisk walk)? 0 days    On average, how many minutes do you engage in exercise at this level? 0 min    Number of minutes of exercise per week 0       Assessment of Health Literacy    How often do you have someone help you read hospital materials? Never    How often do you have problems learning about your medical condition because of difficulty understanding written information? Never    How often do you have a problem understanding what is told to you about your medical condition? Never    How confident are you filling out medical forms by yourself? Quite a bit    Health Literacy Good       Functional Status, IADL    Medications assistive  person    Meal Preparation assistive person    Housekeeping assistive person    Laundry assistive person    Shopping assistive person       Mental Status    General Appearance WDL WDL       Mental Status Summary    Recent Changes in Mental Status/Cognitive Functioning no changes       Employment/    Employment Status retired                 Dayanara Demarco RN

## 2024-08-16 ENCOUNTER — READMISSION MANAGEMENT (OUTPATIENT)
Dept: CALL CENTER | Facility: HOSPITAL | Age: 89
End: 2024-08-16
Payer: MEDICARE

## 2024-08-16 VITALS
RESPIRATION RATE: 18 BRPM | TEMPERATURE: 97.5 F | BODY MASS INDEX: 24.22 KG/M2 | DIASTOLIC BLOOD PRESSURE: 59 MMHG | HEART RATE: 92 BPM | OXYGEN SATURATION: 95 % | SYSTOLIC BLOOD PRESSURE: 119 MMHG | WEIGHT: 136.69 LBS | HEIGHT: 63 IN

## 2024-08-16 LAB
ALBUMIN SERPL-MCNC: 3.5 G/DL (ref 3.5–5.2)
ALBUMIN/GLOB SERPL: 1.3 G/DL
ALP SERPL-CCNC: 80 U/L (ref 39–117)
ALT SERPL W P-5'-P-CCNC: 26 U/L (ref 1–33)
ANION GAP SERPL CALCULATED.3IONS-SCNC: 11.4 MMOL/L (ref 5–15)
AST SERPL-CCNC: 30 U/L (ref 1–32)
BILIRUB SERPL-MCNC: 0.7 MG/DL (ref 0–1.2)
BUN SERPL-MCNC: 12 MG/DL (ref 8–23)
BUN/CREAT SERPL: 15.2 (ref 7–25)
CALCIUM SPEC-SCNC: 9.4 MG/DL (ref 8.2–9.6)
CHLORIDE SERPL-SCNC: 102 MMOL/L (ref 98–107)
CO2 SERPL-SCNC: 26.6 MMOL/L (ref 22–29)
CREAT SERPL-MCNC: 0.79 MG/DL (ref 0.57–1)
DEPRECATED RDW RBC AUTO: 49.6 FL (ref 37–54)
EGFRCR SERPLBLD CKD-EPI 2021: 69.4 ML/MIN/1.73
ERYTHROCYTE [DISTWIDTH] IN BLOOD BY AUTOMATED COUNT: 13.7 % (ref 12.3–15.4)
GLOBULIN UR ELPH-MCNC: 2.6 GM/DL
GLUCOSE SERPL-MCNC: 94 MG/DL (ref 65–99)
HBA1C MFR BLD: 6 % (ref 4.8–5.6)
HCT VFR BLD AUTO: 32.6 % (ref 34–46.6)
HGB BLD-MCNC: 10.5 G/DL (ref 12–15.9)
MAGNESIUM SERPL-MCNC: 2.5 MG/DL (ref 1.7–2.3)
MCH RBC QN AUTO: 32 PG (ref 26.6–33)
MCHC RBC AUTO-ENTMCNC: 32.2 G/DL (ref 31.5–35.7)
MCV RBC AUTO: 99.4 FL (ref 79–97)
PHOSPHATE SERPL-MCNC: 3.9 MG/DL (ref 2.5–4.5)
PLATELET # BLD AUTO: 310 10*3/MM3 (ref 140–450)
PMV BLD AUTO: 9.9 FL (ref 6–12)
POTASSIUM SERPL-SCNC: 3.6 MMOL/L (ref 3.5–5.2)
PROT SERPL-MCNC: 6.1 G/DL (ref 6–8.5)
RBC # BLD AUTO: 3.28 10*6/MM3 (ref 3.77–5.28)
SODIUM SERPL-SCNC: 140 MMOL/L (ref 136–145)
WBC NRBC COR # BLD AUTO: 9.84 10*3/MM3 (ref 3.4–10.8)

## 2024-08-16 PROCEDURE — 85027 COMPLETE CBC AUTOMATED: CPT | Performed by: HOSPITALIST

## 2024-08-16 PROCEDURE — 80053 COMPREHEN METABOLIC PANEL: CPT | Performed by: HOSPITALIST

## 2024-08-16 PROCEDURE — 84100 ASSAY OF PHOSPHORUS: CPT | Performed by: HOSPITALIST

## 2024-08-16 PROCEDURE — 94760 N-INVAS EAR/PLS OXIMETRY 1: CPT

## 2024-08-16 PROCEDURE — 94799 UNLISTED PULMONARY SVC/PX: CPT

## 2024-08-16 PROCEDURE — 83735 ASSAY OF MAGNESIUM: CPT | Performed by: HOSPITALIST

## 2024-08-16 PROCEDURE — 94761 N-INVAS EAR/PLS OXIMETRY MLT: CPT

## 2024-08-16 PROCEDURE — 97161 PT EVAL LOW COMPLEX 20 MIN: CPT

## 2024-08-16 PROCEDURE — 63710000001 PREDNISONE PER 5 MG: Performed by: STUDENT IN AN ORGANIZED HEALTH CARE EDUCATION/TRAINING PROGRAM

## 2024-08-16 PROCEDURE — 83036 HEMOGLOBIN GLYCOSYLATED A1C: CPT | Performed by: HOSPITALIST

## 2024-08-16 PROCEDURE — 97530 THERAPEUTIC ACTIVITIES: CPT

## 2024-08-16 PROCEDURE — 94664 DEMO&/EVAL PT USE INHALER: CPT

## 2024-08-16 RX ADMIN — CITALOPRAM 20 MG: 20 TABLET, FILM COATED ORAL at 09:29

## 2024-08-16 RX ADMIN — Medication 4 ML: at 07:01

## 2024-08-16 RX ADMIN — BUDESONIDE AND FORMOTEROL FUMARATE DIHYDRATE 2 PUFF: 160; 4.5 AEROSOL RESPIRATORY (INHALATION) at 07:02

## 2024-08-16 RX ADMIN — PREDNISONE 10 MG: 10 TABLET ORAL at 09:30

## 2024-08-16 RX ADMIN — SENNOSIDES AND DOCUSATE SODIUM 2 TABLET: 50; 8.6 TABLET ORAL at 09:30

## 2024-08-16 RX ADMIN — BUMETANIDE 2 MG: 2 TABLET ORAL at 09:29

## 2024-08-16 RX ADMIN — APIXABAN 2.5 MG: 2.5 TABLET, FILM COATED ORAL at 11:28

## 2024-08-16 RX ADMIN — IPRATROPIUM BROMIDE AND ALBUTEROL SULFATE 3 ML: .5; 3 SOLUTION RESPIRATORY (INHALATION) at 07:00

## 2024-08-16 RX ADMIN — CARVEDILOL 3.12 MG: 3.12 TABLET, FILM COATED ORAL at 09:29

## 2024-08-16 RX ADMIN — DIGOXIN 125 MCG: 125 TABLET ORAL at 09:30

## 2024-08-16 RX ADMIN — FERROUS SULFATE TAB 325 MG (65 MG ELEMENTAL FE) 325 MG: 325 (65 FE) TAB at 09:30

## 2024-08-16 NOTE — DISCHARGE SUMMARY
Patient Name: Agnieszka Rizzo  : 1930  MRN: [id number]    Date of Admission: 2024  Date of Discharge:  2024  Primary Care Physician: Matt Rose MD      Chief Complaint:   Fall and Hip Pain      Discharge Diagnoses     Active Hospital Problems    Diagnosis  POA    **Fracture of greater trochanter of left femur [S72.112A]  Yes    Chronic diastolic CHF (congestive heart failure) [I50.32]  Yes    Permanent atrial fibrillation [I48.21]  Yes    Primary hypertension [I10]  Yes    Nonobstructive atherosclerosis of coronary artery [I25.10]  Yes    Mitral valve insufficiency [I34.0]  Yes      Resolved Hospital Problems   No resolved problems to display.        Hospital Course     Very pleasant 89yo woman with HFpEF, HTN, and A-fib who presented with mechanical fall and left hip pain. Admitted with left hip fracture. Please see below for details of admission by problem:      Left greater trochanter fracture, nondisplaced  Mechanical fall  -Seen on CT  -Orthopedic Surgery consulted, recommend conservative mgmt with protected weight-bearing/walker until seen in f/u in office in 6 weeks  -PT eval today was suprisingly good--recommending home to assisted living with PT  -Dtr declines home health and would prefer that pt get PT from service at her facility     Permanent AFib  -RC: Coreg and Digoxin, HRs acceptable  -AC: Apixaban 2.5mg, was ON HOLD in case surgery planned--restarted now  -followed by [facility], most recent Echo 10/20/2023 with mild to moderate MR, moderate TR, and moderate pulm HTN  -recommendation at most recent visit 2024: no need to repeat.    -She has nonobstructive CAD. No angina, no further testing was recommended at last visit.     Chronic diastolic heart failure  -BNP 5K in ER, her baseline is around 3.5-4K  -CXR unremarkable  -received dose of IV Lasix at [facility]  -appears euvolemic at present  -restarted her oral Bumex this AM     COPD  On 2L nocturnal O2  -continue Symbicort and BID  DuoNebs  -nocturnal O2 by NC, stable on RA during the day     Leukocytosis  -Likely reactive, resolved w/o tx     Hyperglycemia  -sugars okay this AM  -A1c 6.0        SCDs for DVT prophylaxis while Eliquis was on hold.  Limited code (no CPR, no intubation) confirmed.  Discussed with patient and son. D/w care team at morning huddle.  Anticipate discharge back to assisted living with [facility] HH/PT later today.    Day of Discharge     Subjective:  Feeling fine again today. Left hip just a little sore. Moving LLE fine and walked well with PT and walker. No N/V/D/abd pain. Hungry. Voiding well. No F/C/NS. No SOA or CP or palp.     Physical Exam:  Temp:  [97.7 °F (36.5 °C)-98.8 °F (37.1 °C)] 98.2 °F (36.8 °C)  Heart Rate:  [67-93] 93  Resp:  [18] 18  BP: (123-150)/(64-97) 124/64  Body mass index is 24.21 kg/m².  Physical Exam  Vitals and nursing note reviewed.   Constitutional:       General: She is not in acute distress.     Appearance: She is not ill-appearing, toxic-appearing or diaphoretic.   Cardiovascular:      Rate and Rhythm: Normal rate. Rhythm irregular.      Pulses: Normal pulses.   Pulmonary:      Effort: Pulmonary effort is normal. No respiratory distress.      Breath sounds: Normal breath sounds. No wheezing or rales.   Abdominal:      General: Bowel sounds are normal. There is no distension.      Palpations: Abdomen is soft.      Tenderness: There is no abdominal tenderness.   Musculoskeletal:         General: No swelling or deformity.      Cervical back: Neck supple.      Comments: Surprisingly good ROM of LLE w/o pain  NVI in distal BLEs   Skin:     General: Skin is warm and dry.      Capillary Refill: Capillary refill takes less than 2 seconds.   Neurological:      General: No focal deficit present.      Mental Status: She is alert and oriented to person, place, and time. Mental status is at baseline.      Cranial Nerves: No cranial nerve deficit.      Coordination: Coordination normal.   Psychiatric:          Mood and Affect: Mood normal.         Behavior: Behavior normal.         Thought Content: Thought content normal.         Consultants     Consult Orders (all) (From admission, onward)       Start     Ordered    08/14/24 1816  Inpatient Orthopedic Surgery Consult  Once        Specialty:  Orthopedic Surgery  Provider:  Juan Daniel Hutchins II, MD    08/14/24 1815                  Procedures     * Surgery not found *    Imaging Results (All)       Procedure Component Value Units Date/Time    CT Lower Extremity Left Without Contrast [[id number]] Collected: 08/14/24 1758     Updated: 08/14/24 1810    Narrative:      CT LOWER EXTREMITY LEFT WO CONTRAST-     DATE OF EXAM: 8/14/2024 5:42 PM     INDICATION: Left hip/greater trochanter fracture. Status post fall.     COMPARISON: Radiographs 8/14/2024. CT abdomen pelvis 1/4/2019.     TECHNIQUE: Multiple contiguous axial images were acquired through the  left hip without the intravenous administration of contrast. Reformatted  coronal and sagittal sequences were also reviewed. Radiation dose  reduction techniques were utilized, including automated exposure control  and exposure modulation based on body size.     FINDINGS:  Nondisplaced fracture of the posterior left greater trochanter.     Probable old healed fracture deformity of the left puboacetabular  junction and left inferior pubic ramus. Probable old healed fracture  deformity of the first coccygeal segment.     Mild patchy soft tissue edema lateral to the left hip. Similar-appearing  2 cm left adnexal cyst. No solid soft tissue mass. No free fluid in the  pelvis. Partially imaged colonic diverticula. No abnormal bursal fluid  collection. No pathologically enlarged lymph nodes. Mild to moderate  calcified atherosclerotic disease.     Mild to moderate left hip joint DJD with diffuse chondrocalcinosis.  Moderate to severe DJD of the pubic symphysis with chondrocalcinosis. No  significant joint effusion.     Partial  calcification of the proximal hamstring tendons, likely  chondrocalcinosis. No noncontrast CT evidence of a full-thickness tendon  tear. Mild diffuse muscular fatty atrophy.       Impression:         1. Nondisplaced fracture of the posterior left greater trochanter.  2. Probable old healed fracture deformities of the left puboacetabular  junction, left inferior pubic ramus, and the coccyx.  3. Multifocal DJD with chondrocalcinosis indicating CPPD.     This report was finalized on 8/14/2024 6:07 PM by Scout Sandoval MD on  Workstation: [id number]       XR Chest 1 View [[id number]] Collected: 08/14/24 1534     Updated: 08/14/24 1542    Narrative:      XR FEMUR 2 VW LEFT-, XR HIP W OR WO PELVIS 2-3 VIEW LEFT-, XR CHEST 1  VW-     INDICATIONS: Congestive heart failure, hip pain, trauma 2 days ago.     TECHNIQUE: 4 VIEWS OF THE LEFT FEMUR, FRONTAL VIEW OF THE CHEST, FRONTAL  VIEW OF THE PELVIS, 2 VIEWS OF THE LEFT HIP     COMPARISON: 7/10/2024     FINDINGS:     Chest x-ray: The heart is enlarged. Pulm vasculature is unremarkable.  Aorta is tortuous, calcified. Linear likely atelectasis or scarring at  the right midlung. No focal pulmonary consolidation, pleural effusion,  or pneumothorax. Chronic deformity of the left clavicle. No acute  fracture is identified.     Pelvis, left hip, left femur: The pelvic ring appears intact. A  nondisplaced fracture involving the left greater trochanter is apparent,  the extent of which may be better characterized with cross-sectional  imaging. No other fractures are identified. No dislocation. Degenerative  change is seen at the symphysis pubis. Medial left knee arthroplasty  hardware appears intact. Arterial calcifications are present.       Impression:         As described.           This report was finalized on 8/14/2024 3:39 PM by Dr. Steven Garza M.D on Workstation: [id number]       XR Hip With or Without Pelvis 2 - 3 View Left [[id number]] Collected: 08/14/24 1534     Updated:  08/14/24 1542    Narrative:      XR FEMUR 2 VW LEFT-, XR HIP W OR WO PELVIS 2-3 VIEW LEFT-, XR CHEST 1  VW-     INDICATIONS: Congestive heart failure, hip pain, trauma 2 days ago.     TECHNIQUE: 4 VIEWS OF THE LEFT FEMUR, FRONTAL VIEW OF THE CHEST, FRONTAL  VIEW OF THE PELVIS, 2 VIEWS OF THE LEFT HIP     COMPARISON: 7/10/2024     FINDINGS:     Chest x-ray: The heart is enlarged. Pulm vasculature is unremarkable.  Aorta is tortuous, calcified. Linear likely atelectasis or scarring at  the right midlung. No focal pulmonary consolidation, pleural effusion,  or pneumothorax. Chronic deformity of the left clavicle. No acute  fracture is identified.     Pelvis, left hip, left femur: The pelvic ring appears intact. A  nondisplaced fracture involving the left greater trochanter is apparent,  the extent of which may be better characterized with cross-sectional  imaging. No other fractures are identified. No dislocation. Degenerative  change is seen at the symphysis pubis. Medial left knee arthroplasty  hardware appears intact. Arterial calcifications are present.       Impression:         As described.           This report was finalized on 8/14/2024 3:39 PM by Dr. Steven Garza M.D on Workstation: [id number]       XR Femur 2 View Left [[id number]] Collected: 08/14/24 1534     Updated: 08/14/24 1542    Narrative:      XR FEMUR 2 VW LEFT-, XR HIP W OR WO PELVIS 2-3 VIEW LEFT-, XR CHEST 1  VW-     INDICATIONS: Congestive heart failure, hip pain, trauma 2 days ago.     TECHNIQUE: 4 VIEWS OF THE LEFT FEMUR, FRONTAL VIEW OF THE CHEST, FRONTAL  VIEW OF THE PELVIS, 2 VIEWS OF THE LEFT HIP     COMPARISON: 7/10/2024     FINDINGS:     Chest x-ray: The heart is enlarged. Pulm vasculature is unremarkable.  Aorta is tortuous, calcified. Linear likely atelectasis or scarring at  the right midlung. No focal pulmonary consolidation, pleural effusion,  or pneumothorax. Chronic deformity of the left clavicle. No acute  fracture is  identified.     Pelvis, left hip, left femur: The pelvic ring appears intact. A  nondisplaced fracture involving the left greater trochanter is apparent,  the extent of which may be better characterized with cross-sectional  imaging. No other fractures are identified. No dislocation. Degenerative  change is seen at the symphysis pubis. Medial left knee arthroplasty  hardware appears intact. Arterial calcifications are present.       Impression:         As described.           This report was finalized on 8/14/2024 3:39 PM by Dr. Steven Garza M.D on Workstation: [id number]             Results for orders placed during the hospital encounter of 04/03/23    Duplex Venous Lower Extremity - Bilateral CAR    Interpretation Summary    Chronic right lower extremity deep vein thrombosis noted in the soleal.    All other veins appeared normal bilaterally.    Results for orders placed during the hospital encounter of 10/04/23    Adult Transthoracic Echo Complete W/ Cont if Necessary Per Protocol    Interpretation Summary    Left ventricular systolic function is normal. Calculated left ventricular EF = 56%    Normal right ventricular cavity size and systolic function noted.    The left atrial cavity is severely dilated.    The right atrial cavity is severely dilated.    Mild to moderate mitral valve regurgitation is present.    Moderate tricuspid valve regurgitation is present.    Calculated right ventricular systolic pressure from tricuspid regurgitation is 49 mmHg.    There is a small (<1cm) circumferential pericardial effusion. There is no evidence of cardiac tamponade.    Pertinent Labs     Results from last 7 days   Lab Units 08/16/24  0340 08/15/24  0342 08/14/24  1532   WBC 10*3/mm3 9.84 10.33 11.29*   HEMOGLOBIN g/dL 10.5* 11.0* 10.6*   PLATELETS 10*3/mm3 310 291 248     Results from last 7 days   Lab Units 08/16/24  0340 08/15/24  0342 08/14/24  1532   SODIUM mmol/L 140 143 136   POTASSIUM mmol/L 3.6 3.5 4.7  "  CHLORIDE mmol/L 102 104 100   CO2 mmol/L 26.6 27.7 26.9   BUN mg/dL 12 13 18   CREATININE mg/dL 0.79 0.84 1.15*   GLUCOSE mg/dL 94 100* 185*   EGFR mL/min/1.73 69.4 64.5 44.2*     Results from last 7 days   Lab Units 08/16/24  0340 08/14/24  1532   ALBUMIN g/dL 3.5 4.0   BILIRUBIN mg/dL 0.7 0.5   ALK PHOS U/L 80 90   AST (SGOT) U/L 30 32   ALT (SGPT) U/L 26 28     Results from last 7 days   Lab Units 08/16/24  0340 08/15/24  0342 08/14/24  1532   CALCIUM mg/dL 9.4 9.2 9.1   ALBUMIN g/dL 3.5  --  4.0   MAGNESIUM mg/dL 2.5*  --   --    PHOSPHORUS mg/dL 3.9  --   --        Results from last 7 days   Lab Units 08/14/24  1532   PROBNP pg/mL 5,391.0*   DIGOXIN LVL ng/mL 0.60           Invalid input(s): \"LDLCALC\"          Test Results Pending at Discharge       Discharge Details        Discharge Medications        Changes to Medications        Instructions Start Date   FeroSul 325 (65 Fe) MG tablet  Generic drug: ferrous sulfate  What changed:   how much to take  when to take this   TAKE ONE TABLET BY MOUTH DAILY WITH BREAKFAST             Continue These Medications        Instructions Start Date   acetaminophen 325 MG tablet  Commonly known as: TYLENOL   2 tablets, Oral, Every 4 Hours PRN      albuterol sulfate  (90 Base) MCG/ACT inhaler  Commonly known as: PROVENTIL HFA;VENTOLIN HFA;PROAIR HFA   2 puffs, Inhalation, Every 6 Hours PRN      azithromycin 250 MG tablet  Commonly known as: ZITHROMAX   250 mg, Oral, Daily      benzonatate 200 MG capsule  Commonly known as: TESSALON   1 capsule, Oral, 3 Times Daily PRN      bumetanide 2 MG tablet  Commonly known as: BUMEX   2 mg, Oral, Daily      carvedilol 3.125 MG tablet  Commonly known as: COREG   3.125 mg, Oral, 2 Times Daily With Meals      citalopram 20 MG tablet  Commonly known as: CeleXA   TAKE 1 TABLET BY MOUTH DAILY FOR MAJOR DEPRESSION      digoxin 125 MCG tablet  Commonly known as: LANOXIN   125 mcg, Oral, Every Other Day      Eliquis 2.5 MG tablet " tablet  Generic drug: apixaban   TAKE 1 TABLET EVERY 12 HOURS, FULL ANTICOAGULATION      fexofenadine 180 MG tablet  Commonly known as: ALLEGRA   1 tablet, Oral, Daily      Florajen Acidophilus capsule   1 tablet, Oral, Daily      fluticasone-salmeterol 115-21 MCG/ACT inhaler  Commonly known as: ADVAIR HFA   2 puffs, Inhalation, 2 Times Daily - RT      guaiFENesin 600 MG 12 hr tablet  Commonly known as: MUCINEX   1 tablet, Oral, 2 Times Daily      ipratropium-albuterol 0.5-2.5 mg/3 ml nebulizer  Commonly known as: DUO-NEB   3 mL, Nebulization, 2 Times Daily      multivitamin with minerals tablet tablet   1 tablet, Oral, 2 Times Daily      O2  Commonly known as: OXYGEN   2 L/min, Inhalation, Nightly      potassium chloride 20 MEQ CR tablet  Commonly known as: KLOR-CON M20   20 mEq, Oral, Daily      predniSONE 10 MG tablet  Commonly known as: DELTASONE   1 tablet, Oral, Daily      sodium chloride 7 % nebulizer solution nebulizer solution   4 mL, Nebulization, 2 Times Daily               Allergies   Allergen Reactions    Amlodipine Besylate Swelling    Aspirin GI Intolerance     Caused bleeding ulcers    Bactrim [Sulfamethoxazole-Trimethoprim] Nausea And Vomiting    Erythromycin Unknown (See Comments)     Patient does not recall type of reaction.    Levaquin [Levofloxacin] Unknown (See Comments)     Nausea      Nitrofurantoin Nausea Only and Nausea And Vomiting    Ramipril Other (See Comments)     Cough       Discharge Disposition:  Home-Health Care c      Discharge Diet:  Diet Order   Procedures    Diet: Cardiac; Healthy Heart (2-3 Na+); Fluid Consistency: Thin (IDDSI 0)       Discharge Activity:   Protected weight bearing LLE with walker until f/u with Ortho in 6 weeks    CODE STATUS:    Code Status and Medical Interventions: No CPR (Do Not Attempt to Resuscitate); Limited Support; NO intubation (DNI)   Ordered at: 08/14/24 9768     Medical Intervention Limits:    NO intubation (DNI)     Code Status (Patient has no  pulse and is not breathing):    No CPR (Do Not Attempt to Resuscitate)     Medical Interventions (Patient has pulse or is breathing):    Limited Support       Future Appointments   Date Time Provider Department Center   9/13/2024  3:00 PM Roro Abel PA [facility]   11/21/2024  9:40 AM Marcie Robbins MD [facility]     Additional Instructions for the Follow-ups that You Need to Schedule       Ambulatory Referral to Occupational Therapy for Evaluation & Treatment   As directed      Specialty needed: Evaluate and treat   Modalities: Heat   Exercises: Stretching ROM Strengthening   Gait Training: Left   Weight Bearing Status:  (protected weight-bearing)   Follow-up needed: Yes        Ambulatory Referral to Physical Therapy for Evaluation & Treatment   As directed      Specialty needed: Evaluate and treat   Modalities: Heat   Exercises: Stretching ROM Strengthening   Gait Training: Left   Weight Bearing Status:  (Protected weight bearing)   Follow-up needed: Yes        Discharge Follow-up with PCP   As directed       Currently Documented PCP:    Matt Rose MD    PCP Phone Number:    [phone]     Follow Up Details: Dr. Rose (PCP) in 2 weeks        Discharge Follow-up with Specified Provider: Dr. Hutchins (Ortho); 6 Weeks   As directed      To: Dr. Hutchins (Ortho)   Follow Up: 6 Weeks               Follow-up Information       Matt Rose MD .    Specialty: Family Medicine  Why: Dr. Rose (PCP) in 2 weeks  Contact information:  [address] [address]  [phone]                             Additional Instructions for the Follow-ups that You Need to Schedule       Ambulatory Referral to Occupational Therapy for Evaluation & Treatment   As directed      Specialty needed: Evaluate and treat   Modalities: Heat   Exercises: Stretching ROM Strengthening   Gait Training: Left   Weight Bearing Status:  (protected weight-bearing)   Follow-up needed: Yes        Ambulatory  Referral to Physical Therapy for Evaluation & Treatment   As directed      Specialty needed: Evaluate and treat   Modalities: Heat   Exercises: Stretching ROM Strengthening   Gait Training: Left   Weight Bearing Status:  (Protected weight bearing)   Follow-up needed: Yes        Discharge Follow-up with PCP   As directed       Currently Documented PCP:    Matt Rose MD    PCP Phone Number:    [phone]     Follow Up Details: Dr. Rose (PCP) in 2 weeks        Discharge Follow-up with Specified Provider: Dr. Hutchins (Ortho); 6 Weeks   As directed      To: Dr. Hutchins (Ortho)   Follow Up: 6 Weeks            Time Spent on Discharge:  Greater than 30 minutes      Geovanny Cespedes MD  [facility]  08/16/24  11:35 EDT

## 2024-08-16 NOTE — THERAPY EVALUATION
Patient Name: Agnieszka Rizzo  : 1930    MRN: [id number]                              Today's Date: 2024       Admit Date: 2024    Visit Dx:     ICD-10-CM ICD-9-CM   1. Closed nondisplaced fracture of greater trochanter of left femur, initial encounter  S72.115A 820.20   2. Acute on chronic diastolic congestive heart failure  I50.33 428.33     428.0   3. Longstanding persistent atrial fibrillation  I48.11 427.31     Patient Active Problem List   Diagnosis    Primary hypertension    Nonobstructive atherosclerosis of coronary artery    Familial hypercholesterolemia    Mitral valve insufficiency    Ventricular premature beats    Ventricular tachycardia    Chronic respiratory failure with hypoxia    DNR (do not resuscitate)    Asymptomatic bacteriuria    Iron deficiency anemia    Hypokalemia    Bronchiectasis    IKNA (mycobacterium avium-intracellulare)    Permanent atrial fibrillation    Anxiety    Medicare annual wellness visit, subsequent    Herpes infection    Abnormal EKG    Alteration in anticoagulation    Blind right eye    COPD (chronic obstructive pulmonary disease)    Chronic diastolic CHF (congestive heart failure)    Orthostasis    Nonrheumatic tricuspid valve regurgitation    Pulmonary hypertension    Pericardial effusion    Fracture of greater trochanter of left femur     Past Medical History:   Diagnosis Date    Aspergillus     Asthma     Atrial fibrillation     chronic    Atrial flutter     Bronchiectasis     C. difficile diarrhea 2017    CAD (coronary artery disease)     nonobstructive    Chronic diastolic CHF (congestive heart failure)     Clinical diagnosis of COVID-19 2022    Colitis     Cough     Cryoglobulinemia     Dyspnea on exertion     Exposure to hepatitis A 2018    Fall     Hyperlipidemia     Hypertension     Hyponatremia     Hypoxia     Infectious viral hepatitis     AGE 13    Left shoulder pain     Leg swelling     Lesion of lung     Malignant hyperthermia  due to anesthesia     Mild tricuspid regurgitation     MR (mitral regurgitation)     mild    MVP (mitral valve prolapse)     Permanent atrial fibrillation     Pneumonia of both lungs due to infectious organism 05/31/2017    Followed BY ID for MAC      Pneumonia with the fungal infection aspergillosis 01/06/2017    Pneumothorax     SOB (shortness of breath)     Syncope 07/2024    UTI (urinary tract infection)     Wheeze     mild     Past Surgical History:   Procedure Laterality Date    BRONCHOSCOPY N/A 11/12/2016    Procedure: BRONCHOSCOPY WITH FLUORO, BRUSHINGS, BAL, AND BIOPSIES;  Surgeon: Rogelio Tucker MD;  Location: [facility] ENDOSCOPY;  Service:     BRONCHOSCOPY Bilateral 06/03/2017    Procedure: BRONCHOSCOPY with BAL ;  Surgeon: Sung King MD;  Location: [facility] ENDOSCOPY;  Service:     BRONCHOSCOPY N/A 12/17/2019    Procedure: BRONCHOSCOPY WITH WASHINGS;  Surgeon: Rogelio Tucker MD;  Location: [facility] ENDOSCOPY;  Service: Pulmonary    BRONCHOSCOPY N/A 07/15/2022    Procedure: BRONCHOSCOPY;  Surgeon: Rogelio Tucker MD;  Location: [facility] ENDOSCOPY;  Service: Pulmonary;  Laterality: N/A;  Pre: Pneumonia  Post: Pneumonia    BRONCHOSCOPY N/A 10/2/2023    Procedure: BRONCHOSCOPY WITH BRONCHIAL AVEOLAR LAVAGE;  Surgeon: Rogelio Tucker MD;  Location: [facility] ENDOSCOPY;  Service: Pulmonary;  Laterality: N/A;  PRE:BRONCHIECTASIS /   POST: SAME    CATARACT EXTRACTION EXTRACAPSULAR W/ INTRAOCULAR LENS IMPLANTATION      COLONOSCOPY      2013    D & C WITH SUCTION      HYSTERECTOMY      KNEE ARTHROSCOPY Left     Partial      General Information       Row Name 08/16/24 0909          Physical Therapy Time and Intention    Document Type evaluation  -SM     Mode of Treatment individual therapy;physical therapy  -       Row Name 08/16/24 0909          General Information    Patient Profile Reviewed yes  -SM     Prior Level of Function independent:  -SM     Existing Precautions/Restrictions fall;weight bearing  Protected WB with rwx  -SM        Row Name 08/16/24 0909          Living Environment    People in Home facility resident  shelter  -       Row Name 08/16/24 0909          Home Main Entrance    Number of Stairs, Main Entrance none  -       Row Name 08/16/24 0909          Cognition    Orientation Status (Cognition) oriented x 4  -       Row Name 08/16/24 0909          Safety Issues, Functional Mobility    Impairments Affecting Function (Mobility) pain  -               User Key  (r) = Recorded By, (t) = Taken By, (c) = Cosigned By      Initials Name Provider Type     Nohelia Giraldo PT Physical Therapist                   Mobility       Row Name 08/16/24 0910          Bed Mobility    Bed Mobility supine-sit  -     Supine-Sit Keeler (Bed Mobility) contact guard  -     Assistive Device (Bed Mobility) head of bed elevated;bed rails  -     Comment, (Bed Mobility) Patient UIC at end of session  -       Row Name 08/16/24 0910          Sit-Stand Transfer    Sit-Stand Keeler (Transfers) stand assist  -     Assistive Device (Sit-Stand Transfers) walker, front-wheeled  -       Row Name 08/16/24 0910          Gait/Stairs (Locomotion)    Keeler Level (Gait) stand assist  -     Assistive Device (Gait) walker, front-wheeled  -     Distance in Feet (Gait) 80  -     Deviations/Abnormal Patterns (Gait) base of support, narrow;gait speed decreased  -     Comment, (Gait/Stairs) Gait slow but mostly steady with no overt LOB noted.  -               User Key  (r) = Recorded By, (t) = Taken By, (c) = Cosigned By      Initials Name Provider Type     Nohelia Giraldo PT Physical Therapist                   Obj/Interventions       Row Name 08/16/24 0910          Range of Motion Comprehensive    General Range of Motion bilateral lower extremity ROM WFL  -       Row Name 08/16/24 0910          Strength Comprehensive (MMT)    General Manual Muscle Testing (MMT) Assessment lower extremity strength deficits identified   -SM     Comment, General Manual Muscle Testing (MMT) Assessment Generalized weakness L LE pain limiting  -       Row Name 08/16/24 0910          Balance    Balance Assessment sitting static balance;sit to stand dynamic balance;sitting dynamic balance;standing static balance;standing dynamic balance  -     Static Sitting Balance independent  -SM     Dynamic Sitting Balance modified independence  -SM     Position, Sitting Balance sitting edge of bed  -     Sit to Stand Dynamic Balance standby assist  -SM     Static Standing Balance standby assist  -SM     Dynamic Standing Balance standby assist  -SM     Position/Device Used, Standing Balance supported;walker, front-wheeled  -     Balance Interventions sitting;standing;sit to stand;supported;static;dynamic  -               User Key  (r) = Recorded By, (t) = Taken By, (c) = Cosigned By      Initials Name Provider Type     Nohelia Giraldo, PT Physical Therapist                   Goals/Plan       Row Name 08/16/24 0914          Bed Mobility Goal 1 (PT)    Activity/Assistive Device (Bed Mobility Goal 1, PT) bed mobility activities, all  -SM     Foster Level/Cues Needed (Bed Mobility Goal 1, PT) modified independence  -SM     Time Frame (Bed Mobility Goal 1, PT) 1 week  -       Row Name 08/16/24 0914          Transfer Goal 1 (PT)    Activity/Assistive Device (Transfer Goal 1, PT) sit-to-stand/stand-to-sit;bed-to-chair/chair-to-bed  -SM     Foster Level/Cues Needed (Transfer Goal 1, PT) modified independence  -SM     Time Frame (Transfer Goal 1, PT) 1 week  -       Row Name 08/16/24 0914          Gait Training Goal 1 (PT)    Activity/Assistive Device (Gait Training Goal 1, PT) gait (walking locomotion)  -     Foster Level (Gait Training Goal 1, PT) modified independence  -SM     Distance (Gait Training Goal 1, PT) 150ft  -SM     Time Frame (Gait Training Goal 1, PT) 1 week  -               User Key  (r) = Recorded By, (t) = Taken By,  (c) = Cosigned By      Initials Name Provider Type     Nohelia Giraldo, PT Physical Therapist                   Clinical Impression       Row Name 08/16/24 0911          Pain    Pretreatment Pain Rating 5/10  -SM     Posttreatment Pain Rating 5/10  -SM     Pain Location - Side/Orientation Left  -     Pain Location - hip  -     Pain Intervention(s) Rest;Repositioned;Ambulation/increased activity  -       Row Name 08/16/24 0911          Plan of Care Review    Plan of Care Reviewed With patient  -     Outcome Evaluation Patient is a 89 y.o female who presented to [facility] following a fall resulting in L nodisplaced greater trochanteric fracture. Patient AOx3 supine in bed upon arrival. Patient lives at an [facility] where she is independent at baseline and uses a rwx for ambulation. Per ortho MD patient able to mobilizing using protected WB with support of rwx. Patient completed all bed mobility with CGA and use of hand rails this date. Patient performed STS from EOB with SBA. Patient ambulated 80ft with rwx and SBA. Gait slow but mostly steady with no overt LOB noted. Patient reclined in chair at end of session. Encouraged patient to continue mobilzing with nsg several times per day. Anticpate return to [facility] with HHPT follow up at d/c. Acute PT will continue to monitor.  -       Row Name 08/16/24 0911          Therapy Assessment/Plan (PT)    Rehab Potential (PT) good, to achieve stated therapy goals  -     Criteria for Skilled Interventions Met (PT) yes  -     Therapy Frequency (PT) 6 times/wk  -       Row Name 08/16/24 0911          Vital Signs    Pre Patient Position Supine  -     Intra Patient Position Standing  -SM     Post Patient Position Sitting  -       Row Name 08/16/24 0911          Positioning and Restraints    Pre-Treatment Position in bed  -SM     Post Treatment Position chair  -SM     In Chair notified nsg;reclined;call light within reach;encouraged to call for assist;exit alarm on  -SM                User Key  (r) = Recorded By, (t) = Taken By, (c) = Cosigned By      Initials Name Provider Type     Nohelia Giraldo PT Physical Therapist                   Outcome Measures       Row Name 08/16/24 0914          How much help from another person do you currently need...    Turning from your back to your side while in flat bed without using bedrails? 4  -SM     Moving from lying on back to sitting on the side of a flat bed without bedrails? 3  -SM     Moving to and from a bed to a chair (including a wheelchair)? 4  -SM     Standing up from a chair using your arms (e.g., wheelchair, bedside chair)? 4  -SM     Climbing 3-5 steps with a railing? 2  -SM     To walk in hospital room? 3  -SM     AM-PAC 6 Clicks Score (PT) 20  -     Highest Level of Mobility Goal 6 --> Walk 10 steps or more  -       Row Name 08/16/24 0914          Functional Assessment    Outcome Measure Options AM-PAC 6 Clicks Basic Mobility (PT)  -               User Key  (r) = Recorded By, (t) = Taken By, (c) = Cosigned By      Initials Name Provider Type     Nohelia Giralod PT Physical Therapist                                 Physical Therapy Education       Title: PT OT SLP Therapies (Done)       Topic: Physical Therapy (Done)       Point: Mobility training (Done)       Learning Progress Summary             Patient Acceptance, E, VU by  at 8/16/2024 0915                         Point: Home exercise program (Done)       Learning Progress Summary             Patient Acceptance, E, VU by  at 8/16/2024 0915                         Point: Body mechanics (Done)       Learning Progress Summary             Patient Acceptance, E, VU by  at 8/16/2024 0915                         Point: Precautions (Done)       Learning Progress Summary             Patient Acceptance, E, VU by  at 8/16/2024 0915                                         User Key       Initials Effective Dates Name Provider Type [facility] 05/02/22 -  Kristal  EVELIO Pozo Physical Therapist PT                  PT Recommendation and Plan     Plan of Care Reviewed With: patient  Outcome Evaluation: Patient is a 89 y.o female who presented to [facility] following a fall resulting in L nodisplaced greater trochanteric fracture. Patient AOx3 supine in bed upon arrival. Patient lives at an [facility] where she is independent at baseline and uses a rwx for ambulation. Per ortho MD patient able to mobilizing using protected WB with support of rwx. Patient completed all bed mobility with CGA and use of hand rails this date. Patient performed STS from EOB with SBA. Patient ambulated 80ft with rwx and SBA. Gait slow but mostly steady with no overt LOB noted. Patient reclined in chair at end of session. Encouraged patient to continue mobilzing with nsg several times per day. Anticpate return to [facility] with HHPT follow up at d/c. Acute PT will continue to monitor.     Time Calculation:         PT Charges       Row Name 08/16/24 0916             Time Calculation    Start Time 0834  -      Stop Time 0852  -      Time Calculation (min) 18 min  -SM      PT Received On 08/16/24  -      PT - Next Appointment 08/17/24  -      PT Goal Re-Cert Due Date 08/23/24  -         Time Calculation- PT    Total Timed Code Minutes- PT 10 minute(s)  -SM         Timed Charges    20575 - PT Therapeutic Activity Minutes 10  -SM         Total Minutes    Timed Charges Total Minutes 10  -SM       Total Minutes 10  -SM                User Key  (r) = Recorded By, (t) = Taken By, (c) = Cosigned By      Initials Name Provider Type     Nohelia Giraldo PT Physical Therapist                  Therapy Charges for Today       Code Description Service Date Service Provider Modifiers Qty    68147894475 HC PT THERAPEUTIC ACT EA 15 MIN 8/16/2024 Nohelia Giraldo, PT GP 1    38739237456 HC PT EVAL LOW COMPLEXITY 3 8/16/2024 Nohelia Giraldo, PT GP 1            PT G-Codes  Outcome Measure Options: AM-PAC 6 Clicks Basic Mobility  (PT)  AM-PAC 6 Clicks Score (PT): 20  PT Discharge Summary  Anticipated Discharge Disposition (PT): home with home health, assisted living    Nohelia Giraldo, EVELIO  8/16/2024

## 2024-08-16 NOTE — CASE MANAGEMENT/SOCIAL WORK
Continued Stay Note  [facility]     Patient Name: Agnieszka Rizzo  MRN: [id number]  Today's Date: 8/16/2024    Admit Date: 8/14/2024    Plan: Return to [facility]", OT & PT in house at \[facility]\"" c/ their staff, family to transport   Discharge Plan       Row Name 08/16/24 1236       Plan    Plan Return to \[facility]", OT & PT in house at \[facility]\"" c/ their staff, family to transport    Patient/Family in Agreement with Plan yes    Plan Comments SURAJ HUYNH Lea/Omaira: reviewed PT notes, confirmed pt can return, clarified no need for RN to call report or fax DC summary. RN to send printed copy of DC summary c/ pt upon DC. Lea/Omaira stated pt can have inhouse PT & OT at \[facility]\"" but they need our provider to order upon DC. SURAJ HUYNH pt’s daughter, Michelle, who confirmed plan remains to return to \[facility]\"" via family. Michelle refused referral for  agency, but does want PT & OT thru \[facility]\"" staff. DC to \[facility]\"" via family.                   Discharge Codes    No documentation.                 Expected Discharge Date and Time       Expected Discharge Date Expected Discharge Time    Aug 16, 2024               Dayanara Demarco, RN

## 2024-08-16 NOTE — OUTREACH NOTE
Prep Survey      Flowsheet Row Responses   [facility] patient discharged from? [place]   Is LACE score < 7 ? No   Eligibility [facility]   Date of Admission 08/14/24   Date of Discharge 08/16/24   Discharge diagnosis Fracture of greater trochanter of left femur   Does the patient have one of the following disease processes/diagnoses(primary or secondary)? Other   General alerts for this patient [facility].   Prep survey completed? Yes            Beverley GARG - Registered Nurse

## 2024-08-16 NOTE — PLAN OF CARE
Goal Outcome Evaluation:  Plan of Care Reviewed With: patient           Outcome Evaluation: Patient is a 89 y.o female who presented to [facility] following a fall resulting in L nodisplaced greater trochanteric fracture. Patient AOx3 supine in bed upon arrival. Patient lives at an [facility] where she is independent at baseline and uses a rwx for ambulation. Per ortho MD patient able to mobilizing using protected WB with support of rwx. Patient completed all bed mobility with CGA and use of hand rails this date. Patient performed STS from EOB with SBA. Patient ambulated 80ft with rwx and SBA. Gait slow but mostly steady with no overt LOB noted. Patient reclined in chair at end of session. Encouraged patient to continue mobilzing with nsg several times per day. Anticpate return to [facility] with HHPT follow up at d/c. Acute PT will continue to monitor.      Anticipated Discharge Disposition (PT): home with home health, assisted living

## 2024-08-16 NOTE — CASE MANAGEMENT/SOCIAL WORK
Case Management Discharge Note      Final Note: Return to [facility] v/ PT/OT thru [facility], daughter transporting    Provided Post Acute Provider List?: Yes  Post Acute Provider List: Nursing Home  Provided Post Acute Provider Quality & Resource List?: Yes  Post Acute Provider Quality and Resource List: Nursing Home  Delivered To: Patient, Support Person  Support Person: Michelle  Method of Delivery: Other (comment), In person (Left at pt's bedside table)    Selected Continued Care - Discharged on 8/16/2024 Admission date: 8/14/2024 - Discharge disposition: Home-Health Care [facility]         Selected Continued Care - Prior Encounters Includes continued care and service providers with selected services from prior encounters from 5/16/2024 to 8/16/2024      Discharged on 7/13/2024 Admission date: 7/10/2024 - Discharge disposition: Home-Health Care [facility]      Home Medical Care       Service Provider Selected Services Address Phone Fax Patient Preferred    [facility]  , [address], [place] [address] [phone] --                               Final Discharge Disposition Code: 01 - home or self-care

## 2024-08-19 ENCOUNTER — TRANSITIONAL CARE MANAGEMENT TELEPHONE ENCOUNTER (OUTPATIENT)
Dept: CALL CENTER | Facility: HOSPITAL | Age: 89
End: 2024-08-19
Payer: MEDICARE

## 2024-08-19 NOTE — OUTREACH NOTE
Call Center TCM Note      Flowsheet Row Responses   [facility] patient discharged from? [place]   Does the patient have one of the following disease processes/diagnoses(primary or secondary)? Other   TCM attempt successful? No   Unsuccessful attempts Attempt 1   Call Status Left message            Shikha Gonzalez RN    8/19/2024, 10:19 EDT

## 2024-08-19 NOTE — OUTREACH NOTE
Call Center TCM Note      Flowsheet Row Responses   [facility] [place] [facility] [place]   Does the patient have one of the following disease processes/diagnoses(primary or secondary)? Other   TCM attempt successful? Yes   Call start time 1502   Call end time 1509   General alerts for this patient Assisted Living [facility].   Discharge diagnosis Fracture of greater trochanter of left femur   Person spoke with today (if not patient) and relationship Dtr-Michelle   Meds reviewed with patient/caregiver? Yes   Is the patient having any side effects they believe may be caused by any medication additions or changes? No   Does the patient have all medications ordered at discharge? N/A   Is the patient taking all medications as directed (includes completed medication regime)? Yes   Comments Daughter states Pallative Care comes to [facility] and sees patient once a month and facility has APRN   Does the patient have an appointment with their PCP within 7-14 days of discharge? No   Has home health visited the patient within 72 hours of discharge? N/A   Home health comments Pt will start PT at [facility]   DME comments pt uses a walker for balance, has a wheelchair if long distance, wears 2LO2 at night   Psychosocial issues? No   What is the patient's perception of their health status since discharge? Improving   Is the patient/caregiver able to teach back the hierarchy of who to call/visit for symptoms/problems? PCP, Specialist, Home health nurse, Urgent Care, ED, 911 Yes   TCM call completed? Yes   Call end time 1509   Would this patient benefit from a Referral to Amb Social Work? No   Is the patient interested in additional calls from an ambulatory ? No            Shikha Gonzalez RN    8/19/2024, 15:09 EDT

## 2024-08-20 RX ORDER — POTASSIUM CHLORIDE 20 MEQ/1
20 TABLET, EXTENDED RELEASE ORAL DAILY
Qty: 90 TABLET | Refills: 3 | Status: SHIPPED | OUTPATIENT
Start: 2024-08-20

## 2024-08-26 ENCOUNTER — READMISSION MANAGEMENT (OUTPATIENT)
Dept: CALL CENTER | Facility: HOSPITAL | Age: 89
End: 2024-08-26
Payer: MEDICARE

## 2024-08-26 NOTE — OUTREACH NOTE
Medical Week 2 Survey      Flowsheet Row Responses   [facility] patient discharged from? [place]   Does the patient have one of the following disease processes/diagnoses(primary or secondary)? Other   Week 2 attempt successful? Yes   Call start time 1409   General alerts for this patient [facility].   Discharge diagnosis Fracture of greater trochanter of left femur   Call end time 1411   Is patient permission given to speak with other caregiver? Yes   List who call center can speak with daughter- Michelle   Person spoke with today (if not patient) and relationship daughter   Is the patient taking all medications as directed (includes completed medication regime)? Yes   Does the patient have a primary care provider?  Yes   Comments regarding PCP has a nurse APRN that is seeing patient at the assisted living facility   Has the patient kept scheduled appointments due by today? Yes   Home health comments Patient has PT at her assisted living facility   Psychosocial issues? No   What is the patient's perception of their health status since discharge? Improving   Is the patient/caregiver able to teach back signs and symptoms related to disease process for when to call PCP? Yes   Is the patient/caregiver able to teach back signs and symptoms related to disease process for when to call 911? Yes   Is the patient/caregiver able to teach back the hierarchy of who to call/visit for symptoms/problems? PCP, Specialist, Home health nurse, Urgent Care, ED, 911 Yes   Week 2 Call Completed? Yes   Graduated Yes   Did the patient feel the follow up calls were helpful during their recovery period? Yes   Was the number of calls appropriate? Yes   Is the patient interested in additional calls from an ambulatory ? No   Would this patient benefit from a Referral to Amb Social Work? No   Graduated/Revoked comments Per daughter, patient is doing well at her assisted living facility and has all that she  needs, no further calls needed.   Call end time 1411            Rajani BUTSOS - Registered Nurse

## 2024-09-04 ENCOUNTER — TELEPHONE (OUTPATIENT)
Dept: NEUROLOGY | Facility: CLINIC | Age: 89
End: 2024-09-04
Payer: MEDICARE

## 2024-09-04 NOTE — TELEPHONE ENCOUNTER
LVM informing the patient that we are r/s appt due to provider being out of office. Had sent a mail reminder and "[employer]t Message (if applicable) on next available appt date for October 10th at 1:30PM

## 2024-09-06 RX ORDER — BUMETANIDE 2 MG/1
TABLET ORAL
Qty: 180 TABLET | Refills: 3 | Status: SHIPPED | OUTPATIENT
Start: 2024-09-06

## 2024-10-07 NOTE — PROGRESS NOTES
"[facility] f/u    HPI:  Agnieszka Rizzo is a  89 y.o.  right-handed female with PMH of CHF, COPD, asthma, afib on eliquis 2.5 mg bid, cad who had a syncopal event in July and presented to the hospital.  She was in the shower and became weak and lightheaded.  There was report of palpitations and also bowel incontinence.  In the hospital she had elevated digoxin level and hypokalemia.   There is no h/o prior seizure.  MRI and EEG were unrevealing    She is here with daughter today.  Since I saw her she had hospitalization for hip fx and is in assisted living. No further syncopal spells.  There are some periods of confusion more than in the past.  Pt has no formal dx of dementia, is forgetful and children doing finances for some time.  Pt independent of adls.  Seems to occur more at night and is improving, less frequent.  No reports of confusion this week.  Pt has her nails done      Social history:    Family history:      Pain Scale:        ROS:  Review of Systems   Musculoskeletal:  Positive for gait problem (balance problem with 2 falls, 1 with hip injury-walker).   Psychiatric/Behavioral:  Positive for confusion (regarding time off & on) and decreased concentration.        Reviewed ROS conducted by MA and pauly        Physical Exam:  There were no vitals filed for this visit.   10/10/2024     1:31 PM      /62   Heart Rate 64   Weight 59 kg (130 lb)   Height 160 cm (63\")   SpO2 99 %          There is no height or weight on file to calculate BMI.        General: pt well appearing, no distress  HEENT: Normocephalic, conjunctiva normal, external canals normal, no nasal discharge, moist mucous membranes  Neck: No lymphadenopathy, thyroid not enlarged, no JVD  CV: Regular rate and rhythm, no murmurs negative no bruits auscultated at neck, equal pulses  Pulmonary: Normal respiratory effort, clear to auscultation bilaterally  Extremities: no edema, bruising, or skin lesions  Pysch: good eye contact, cooperative, full " "affect, euthymic mood, good attention, good insight  Mental: alert, conversant, AOx3, provides history, 3/3 recall.  Language fluent, names, repeats.  CN: CN II-XII intact, PERRL, EOMi, no gaze palsy or nystagmus, intact facial sensation, no facial assymetry, intact hearing, symmetric palate elevation, tongue midline, good SCM strength  Motor: no abnormal movements, no pronator drift, normal  bulk and tone, 5/5 strength b/l UE & LE  Sensory: normal sensation to crude touch, temperature, and vibration throughout  Reflexes: 2+ throughout, neg babinski  Coordination: no ataxia on finger-nose, heel-shin  Gait: normal gait, no ataxia, intact heel and toe walking        Results:      Lab Results   Component Value Date    GLUCOSE 94 08/16/2024    BUN 12 08/16/2024    CREATININE 0.79 08/16/2024    EGFRIFNONA 67 08/27/2021    EGFRIFAFRI 87 11/14/2019    BCR 15.2 08/16/2024    CO2 26.6 08/16/2024    CALCIUM 9.4 08/16/2024    PROTENTOTREF 6.2 10/10/2023    ALBUMIN 3.5 08/16/2024    LABIL2 1.1 10/10/2023    AST 30 08/16/2024    ALT 26 08/16/2024       Lab Results   Component Value Date    WBC 9.84 08/16/2024    HGB 10.5 (L) 08/16/2024    HCT 32.6 (L) 08/16/2024    MCV 99.4 (H) 08/16/2024     08/16/2024         .No results found for: \"RPR\"      Lab Results   Component Value Date    TSH 4.790 (H) 07/10/2024    A1UEKYP 7.24 12/01/2016         Lab Results   Component Value Date    AIZYOKJS48 857 09/21/2017         Lab Results   Component Value Date    FOLATE >20.00 09/21/2017         Lab Results   Component Value Date    HGBA1C 6.00 (H) 08/16/2024         Lab Results   Component Value Date    GLUCOSE 94 08/16/2024    BUN 12 08/16/2024    CREATININE 0.79 08/16/2024    EGFRIFNONA 67 08/27/2021    EGFRIFAFRI 87 11/14/2019    BCR 15.2 08/16/2024    K 3.6 08/16/2024    CO2 26.6 08/16/2024    CALCIUM 9.4 08/16/2024    PROTENTOTREF 6.2 10/10/2023    ALBUMIN 3.5 08/16/2024    LABIL2 1.1 10/10/2023    AST 30 08/16/2024    ALT 26 " 08/16/2024         Diagnosis:  Syncope  Delirium, improving    Impression:  89 yo F with PMH chf, copd/asthma, afib on eliquis who was seen in the summer during a hospitalization after syncopal event.  Her MRI brain and EEG  were unrevealing for neurologic etiology.  There was mild to moderate ischemic disease and generalized slowing.  Her digoxin level was elevated and cards decreased her dose of this and bumex.Since d/c there have been no further episodes.  She was rehospitalized for hip fx and is now in assisted living.  Suspect MCI vs mild dementia.  Dtr is describing some confusional spells c/w mild sundowning and resolving delirium but overall no suspicion for seizure. Delirium precautions and prognosis discussed.  F/u in 2-3 mos    I spent at least 40 minutes interviewing, examining, and counseling patient.  I independently reviewed documentation, laboratory and diagnostic findings, external documentation where applicable, and formulated treatment plan which was discussed with the patient.

## 2024-10-10 ENCOUNTER — OFFICE VISIT (OUTPATIENT)
Dept: NEUROLOGY | Facility: CLINIC | Age: 89
End: 2024-10-10
Payer: MEDICARE

## 2024-10-10 VITALS
WEIGHT: 130 LBS | OXYGEN SATURATION: 99 % | SYSTOLIC BLOOD PRESSURE: 110 MMHG | DIASTOLIC BLOOD PRESSURE: 62 MMHG | HEIGHT: 63 IN | BODY MASS INDEX: 23.04 KG/M2 | HEART RATE: 64 BPM

## 2024-10-10 DIAGNOSIS — I10 PRIMARY HYPERTENSION: Primary | ICD-10-CM

## 2024-10-10 DIAGNOSIS — I48.21 PERMANENT ATRIAL FIBRILLATION: ICD-10-CM

## 2024-10-10 DIAGNOSIS — R55 SYNCOPE AND COLLAPSE: ICD-10-CM

## 2024-11-06 ENCOUNTER — PATIENT OUTREACH (OUTPATIENT)
Dept: CASE MANAGEMENT | Facility: OTHER | Age: 89
End: 2024-11-06
Payer: MEDICARE

## 2024-11-06 NOTE — OUTREACH NOTE
AMBULATORY CASE MANAGEMENT NOTE    Names and Relationships of Patient/Support Persons: Contact: MatthiasAgnieszka butler; Relationship: Self -     Patient Outreach  RN-ACM outreach with patient regarding identified proactive HRCM review. Reviewed with patient role of RN-ACM and HRCM case management services. Patient verbalized understanding. Patient states to live in [facility]; receiving assistance as needed. Patient states to be compliant with medications; use of O2  at 2 L at night; medical appointments and states no difficulty with chest pain; SOB; appetite or sleeping. Patient states to ambulate with walker; uses Life Alert and no difficulty with falls.Reviewed with patient education and verbalized understanding. Patient states to appreciate outreach; declines needs or participation for further outreach at this time. No further questions voiced at this time.   Adult Patient Profile  Questions/Answers      Flowsheet Row Most Recent Value   Symptoms/Conditions Managed at Home cardiovascular, respiratory   Cardiovascular Symptoms/Conditions heart failure, hypertension, dysrhythmia   Cardiovascular Management Strategies medication therapy, other (see comments)  [Physician follow up]   Respiratory Symptoms/Conditions COPD   Respiratory Management Strategies medication therapy, oxygen therapy, other (see comments)  [Physician follow up]   Barriers to Taking Medication as Prescribed none  [Patient states to receive assistance from daughter who assists in medication organization]   Primary Source of Support/Comfort child(graciela)   Name of Support/Comfort Primary Source Patient states to receive assistance as needed from daughter and staff at [facility]   People in Home facility resident        Social Work Assessment  Questions/Answers      Flowsheet Row Most Recent Value   People in Home facility resident   Functional Status Comments Patient states to be independent with ADL's receiving assitance as needed,  receiving assistance with  transportation and ambulating with assistance of walker.        Send Education  Questions/Answers      Flowsheet Row Most Recent Value   Other Patient Education/Resources  24/7 [facility] Nurse Call Line, Iain Advanced Care Planning   24/7 Nurse Call Line Education Method Verbal   Advanced Directives: Patient Has        SDOH updated and reviewed with the patient during this program:  --     Food Insecurity: No Food Insecurity (11/6/2024)    Hunger Vital Sign     Worried About Running Out of Food in the Last Year: Never true     Ran Out of Food in the Last Year: Never true      --     Transportation Needs: No Transportation Needs (11/6/2024)    PRAPARE - Transportation     Lack of Transportation (Medical): No     Lack of Transportation (Non-Medical): No     Education Documentation  Energy Conservation, taught by Ria Hinton, RN at 11/6/2024 10:53 AM.  Learner: Patient  Readiness: Acceptance  Method: Explanation  Response: Verbalizes Understanding    Assistive/Adaptive Devices, taught by Ria Hinton RN at 11/6/2024 10:53 AM.  Learner: Patient  Readiness: Acceptance  Method: Explanation  Response: Verbalizes Understanding    Provider Follow-Up, taught by Ria Hinton, RN at 11/6/2024 10:53 AM.  Learner: Patient  Readiness: Acceptance  Method: Explanation  Response: Verbalizes Understanding    Oxygen Safety, taught by Ria Hinton RN at 11/6/2024 10:53 AM.  Learner: Patient  Readiness: Acceptance  Method: Explanation  Response: Verbalizes Understanding          Ria MILLIGAN  Ambulatory Case Management    11/6/2024, 10:54 EST

## 2024-11-18 ENCOUNTER — APPOINTMENT (OUTPATIENT)
Dept: CT IMAGING | Facility: HOSPITAL | Age: 89
End: 2024-11-18
Payer: MEDICARE

## 2024-11-18 ENCOUNTER — HOSPITAL ENCOUNTER (OUTPATIENT)
Facility: HOSPITAL | Age: 89
LOS: 1 days | Discharge: HOME | End: 2024-11-19
Attending: STUDENT IN AN ORGANIZED HEALTH CARE EDUCATION/TRAINING PROGRAM | Admitting: HOSPITALIST
Payer: MEDICARE

## 2024-11-18 DIAGNOSIS — R10.84 GENERALIZED ABDOMINAL PAIN: Primary | ICD-10-CM

## 2024-11-18 DIAGNOSIS — R10.13 EPIGASTRIC PAIN: ICD-10-CM

## 2024-11-18 DIAGNOSIS — K80.20 GALLSTONES: ICD-10-CM

## 2024-11-18 DIAGNOSIS — R94.31 ABNORMAL EKG: ICD-10-CM

## 2024-11-18 PROBLEM — D64.9 ANEMIA: Status: ACTIVE | Noted: 2024-11-18

## 2024-11-18 PROBLEM — J40: Status: ACTIVE | Noted: 2024-11-18

## 2024-11-18 PROBLEM — R10.9 ABDOMINAL PAIN: Status: ACTIVE | Noted: 2024-11-18

## 2024-11-18 PROBLEM — D72.829 LEUKOCYTOSIS: Status: ACTIVE | Noted: 2024-11-18

## 2024-11-18 LAB
ALBUMIN SERPL-MCNC: 3.7 G/DL (ref 3.5–5.2)
ALBUMIN/GLOB SERPL: 1.4 G/DL
ALP SERPL-CCNC: 99 U/L (ref 39–117)
ALT SERPL W P-5'-P-CCNC: 19 U/L (ref 1–33)
ANION GAP SERPL CALCULATED.3IONS-SCNC: 8.6 MMOL/L (ref 5–15)
AST SERPL-CCNC: 15 U/L (ref 1–32)
BASOPHILS # BLD AUTO: 0.03 10*3/MM3 (ref 0–0.2)
BASOPHILS NFR BLD AUTO: 0.3 % (ref 0–1.5)
BILIRUB SERPL-MCNC: 0.5 MG/DL (ref 0–1.2)
BILIRUB UR QL STRIP: NEGATIVE
BUN SERPL-MCNC: 11 MG/DL (ref 8–23)
BUN/CREAT SERPL: 12.9 (ref 7–25)
CALCIUM SPEC-SCNC: 9.2 MG/DL (ref 8.2–9.6)
CHLORIDE SERPL-SCNC: 100 MMOL/L (ref 98–107)
CLARITY UR: CLEAR
CO2 SERPL-SCNC: 28.4 MMOL/L (ref 22–29)
COLOR UR: YELLOW
CREAT SERPL-MCNC: 0.85 MG/DL (ref 0.57–1)
D-LACTATE SERPL-SCNC: 1.8 MMOL/L (ref 0.5–2)
DEPRECATED RDW RBC AUTO: 60 FL (ref 37–54)
EGFRCR SERPLBLD CKD-EPI 2021: 63.6 ML/MIN/1.73
EOSINOPHIL # BLD AUTO: 0.06 10*3/MM3 (ref 0–0.4)
EOSINOPHIL NFR BLD AUTO: 0.5 % (ref 0.3–6.2)
ERYTHROCYTE [DISTWIDTH] IN BLOOD BY AUTOMATED COUNT: 15.2 % (ref 12.3–15.4)
GEN 5 2HR TROPONIN T REFLEX: 28 NG/L
GLOBULIN UR ELPH-MCNC: 2.7 GM/DL
GLUCOSE SERPL-MCNC: 130 MG/DL (ref 65–99)
GLUCOSE UR STRIP-MCNC: NEGATIVE MG/DL
HCT VFR BLD AUTO: 33.3 % (ref 34–46.6)
HGB BLD-MCNC: 10.1 G/DL (ref 12–15.9)
HGB UR QL STRIP.AUTO: NEGATIVE
IMM GRANULOCYTES # BLD AUTO: 0.18 10*3/MM3 (ref 0–0.05)
IMM GRANULOCYTES NFR BLD AUTO: 1.6 % (ref 0–0.5)
KETONES UR QL STRIP: NEGATIVE
LEUKOCYTE ESTERASE UR QL STRIP.AUTO: NEGATIVE
LIPASE SERPL-CCNC: 46 U/L (ref 13–60)
LYMPHOCYTES # BLD AUTO: 1.15 10*3/MM3 (ref 0.7–3.1)
LYMPHOCYTES NFR BLD AUTO: 9.9 % (ref 19.6–45.3)
MCH RBC QN AUTO: 32.3 PG (ref 26.6–33)
MCHC RBC AUTO-ENTMCNC: 30.3 G/DL (ref 31.5–35.7)
MCV RBC AUTO: 106.4 FL (ref 79–97)
MONOCYTES # BLD AUTO: 0.69 10*3/MM3 (ref 0.1–0.9)
MONOCYTES NFR BLD AUTO: 6 % (ref 5–12)
NEUTROPHILS NFR BLD AUTO: 81.7 % (ref 42.7–76)
NEUTROPHILS NFR BLD AUTO: 9.48 10*3/MM3 (ref 1.7–7)
NITRITE UR QL STRIP: NEGATIVE
PH UR STRIP.AUTO: 7 [PH] (ref 5–8)
PLATELET # BLD AUTO: 293 10*3/MM3 (ref 140–450)
PMV BLD AUTO: 10.8 FL (ref 6–12)
POTASSIUM SERPL-SCNC: 4.1 MMOL/L (ref 3.5–5.2)
PROT SERPL-MCNC: 6.4 G/DL (ref 6–8.5)
PROT UR QL STRIP: NEGATIVE
QT INTERVAL: 309 MS
QTC INTERVAL: 350 MS
RBC # BLD AUTO: 3.13 10*6/MM3 (ref 3.77–5.28)
SODIUM SERPL-SCNC: 137 MMOL/L (ref 136–145)
SP GR UR STRIP: 1.01 (ref 1–1.03)
TROPONIN T DELTA: -7 NG/L
TROPONIN T SERPL HS-MCNC: 35 NG/L
UROBILINOGEN UR QL STRIP: NORMAL
WBC NRBC COR # BLD AUTO: 11.59 10*3/MM3 (ref 3.4–10.8)

## 2024-11-18 PROCEDURE — 80053 COMPREHEN METABOLIC PANEL: CPT | Performed by: PHYSICIAN ASSISTANT

## 2024-11-18 PROCEDURE — 99285 EMERGENCY DEPT VISIT HI MDM: CPT | Performed by: PHYSICIAN ASSISTANT

## 2024-11-18 PROCEDURE — 71275 CT ANGIOGRAPHY CHEST: CPT

## 2024-11-18 PROCEDURE — 94799 UNLISTED PULMONARY SVC/PX: CPT

## 2024-11-18 PROCEDURE — 0202U NFCT DS 22 TRGT SARS-COV-2: CPT | Performed by: NURSE PRACTITIONER

## 2024-11-18 PROCEDURE — 36415 COLL VENOUS BLD VENIPUNCTURE: CPT

## 2024-11-18 PROCEDURE — 93005 ELECTROCARDIOGRAM TRACING: CPT | Performed by: PHYSICIAN ASSISTANT

## 2024-11-18 PROCEDURE — 94761 N-INVAS EAR/PLS OXIMETRY MLT: CPT

## 2024-11-18 PROCEDURE — 99285 EMERGENCY DEPT VISIT HI MDM: CPT

## 2024-11-18 PROCEDURE — 85025 COMPLETE CBC W/AUTO DIFF WBC: CPT | Performed by: PHYSICIAN ASSISTANT

## 2024-11-18 PROCEDURE — 83605 ASSAY OF LACTIC ACID: CPT | Performed by: PHYSICIAN ASSISTANT

## 2024-11-18 PROCEDURE — 81003 URINALYSIS AUTO W/O SCOPE: CPT | Performed by: PHYSICIAN ASSISTANT

## 2024-11-18 PROCEDURE — 93010 ELECTROCARDIOGRAM REPORT: CPT | Performed by: INTERNAL MEDICINE

## 2024-11-18 PROCEDURE — 84484 ASSAY OF TROPONIN QUANT: CPT | Performed by: PHYSICIAN ASSISTANT

## 2024-11-18 PROCEDURE — 83690 ASSAY OF LIPASE: CPT | Performed by: PHYSICIAN ASSISTANT

## 2024-11-18 PROCEDURE — 25810000003 LACTATED RINGERS SOLUTION: Performed by: PHYSICIAN ASSISTANT

## 2024-11-18 PROCEDURE — 25510000001 IOPAMIDOL PER 1 ML: Performed by: STUDENT IN AN ORGANIZED HEALTH CARE EDUCATION/TRAINING PROGRAM

## 2024-11-18 PROCEDURE — 74177 CT ABD & PELVIS W/CONTRAST: CPT

## 2024-11-18 PROCEDURE — 94640 AIRWAY INHALATION TREATMENT: CPT

## 2024-11-18 RX ORDER — ACETAMINOPHEN 325 MG/1
650 TABLET ORAL EVERY 4 HOURS PRN
Status: DISCONTINUED | OUTPATIENT
Start: 2024-11-18 | End: 2024-11-19 | Stop reason: HOSPADM

## 2024-11-18 RX ORDER — ONDANSETRON 2 MG/ML
4 INJECTION INTRAMUSCULAR; INTRAVENOUS EVERY 6 HOURS PRN
Status: DISCONTINUED | OUTPATIENT
Start: 2024-11-18 | End: 2024-11-19 | Stop reason: HOSPADM

## 2024-11-18 RX ORDER — SERTRALINE HYDROCHLORIDE 25 MG/1
1 TABLET, FILM COATED ORAL DAILY
COMMUNITY
Start: 2024-11-01

## 2024-11-18 RX ORDER — ACETAMINOPHEN 160 MG/5ML
650 SOLUTION ORAL EVERY 4 HOURS PRN
Status: DISCONTINUED | OUTPATIENT
Start: 2024-11-18 | End: 2024-11-19 | Stop reason: HOSPADM

## 2024-11-18 RX ORDER — FAMOTIDINE 20 MG/1
20 TABLET, FILM COATED ORAL 2 TIMES DAILY PRN
Status: DISCONTINUED | OUTPATIENT
Start: 2024-11-18 | End: 2024-11-19 | Stop reason: HOSPADM

## 2024-11-18 RX ORDER — SODIUM CHLORIDE 0.9 % (FLUSH) 0.9 %
10 SYRINGE (ML) INJECTION EVERY 12 HOURS SCHEDULED
Status: DISCONTINUED | OUTPATIENT
Start: 2024-11-18 | End: 2024-11-19 | Stop reason: HOSPADM

## 2024-11-18 RX ORDER — BISACODYL 10 MG
10 SUPPOSITORY, RECTAL RECTAL DAILY PRN
Status: DISCONTINUED | OUTPATIENT
Start: 2024-11-18 | End: 2024-11-19 | Stop reason: HOSPADM

## 2024-11-18 RX ORDER — BISACODYL 5 MG/1
5 TABLET, DELAYED RELEASE ORAL DAILY PRN
Status: DISCONTINUED | OUTPATIENT
Start: 2024-11-18 | End: 2024-11-19 | Stop reason: HOSPADM

## 2024-11-18 RX ORDER — POLYETHYLENE GLYCOL 3350 17 G/17G
17 POWDER, FOR SOLUTION ORAL DAILY PRN
Status: DISCONTINUED | OUTPATIENT
Start: 2024-11-18 | End: 2024-11-19 | Stop reason: HOSPADM

## 2024-11-18 RX ORDER — NITROGLYCERIN 0.4 MG/1
0.4 TABLET SUBLINGUAL
Status: DISCONTINUED | OUTPATIENT
Start: 2024-11-18 | End: 2024-11-19 | Stop reason: HOSPADM

## 2024-11-18 RX ORDER — IPRATROPIUM BROMIDE AND ALBUTEROL SULFATE 2.5; .5 MG/3ML; MG/3ML
3 SOLUTION RESPIRATORY (INHALATION)
Status: DISCONTINUED | OUTPATIENT
Start: 2024-11-18 | End: 2024-11-19 | Stop reason: HOSPADM

## 2024-11-18 RX ORDER — IOPAMIDOL 755 MG/ML
100 INJECTION, SOLUTION INTRAVASCULAR
Status: COMPLETED | OUTPATIENT
Start: 2024-11-18 | End: 2024-11-18

## 2024-11-18 RX ORDER — SODIUM CHLORIDE 0.9 % (FLUSH) 0.9 %
10 SYRINGE (ML) INJECTION AS NEEDED
Status: DISCONTINUED | OUTPATIENT
Start: 2024-11-18 | End: 2024-11-19 | Stop reason: HOSPADM

## 2024-11-18 RX ORDER — SODIUM CHLORIDE 9 MG/ML
40 INJECTION, SOLUTION INTRAVENOUS AS NEEDED
Status: DISCONTINUED | OUTPATIENT
Start: 2024-11-18 | End: 2024-11-19 | Stop reason: HOSPADM

## 2024-11-18 RX ORDER — ACETAMINOPHEN 650 MG/1
650 SUPPOSITORY RECTAL EVERY 4 HOURS PRN
Status: DISCONTINUED | OUTPATIENT
Start: 2024-11-18 | End: 2024-11-19 | Stop reason: HOSPADM

## 2024-11-18 RX ORDER — AMOXICILLIN 250 MG
2 CAPSULE ORAL 2 TIMES DAILY PRN
Status: DISCONTINUED | OUTPATIENT
Start: 2024-11-18 | End: 2024-11-19 | Stop reason: HOSPADM

## 2024-11-18 RX ADMIN — SODIUM CHLORIDE, POTASSIUM CHLORIDE, SODIUM LACTATE AND CALCIUM CHLORIDE 500 ML: 600; 310; 30; 20 INJECTION, SOLUTION INTRAVENOUS at 12:50

## 2024-11-18 RX ADMIN — IOPAMIDOL 85 ML: 755 INJECTION, SOLUTION INTRAVENOUS at 13:19

## 2024-11-18 RX ADMIN — IPRATROPIUM BROMIDE AND ALBUTEROL SULFATE 3 ML: 2.5; .5 SOLUTION RESPIRATORY (INHALATION) at 23:24

## 2024-11-18 NOTE — [facility] PROVIDER NOTE
EMERGENCY DEPARTMENT ENCOUNTER    Room Number:  N327/1  Date seen:  11/18/2024  Time seen: 12:15 EST  PCP: Matt Rose MD  Historian: Patient    Discussed/obtained information from independent historians: Daughter helps with history    HPI:  Chief complaint: Abdominal pain, upper back pain  A complete HPI/ROS/PMH/PSH/SH/FH are unobtainable due to: Nothing  Context:Agnieszka Rizzo is a 89 y.o. female with significant past medical history of hypertension, hyperlipidemia, UTI, hypokalemia, bronchiectasis, CHF, COPD, pneumonia, A-fib on Eliquis who presents to the ED with c/o abdominal pain, feeling ill, interscapular pain in the upper back.  Patient reports symptoms began roughly 2 days ago.  No nausea vomiting or diarrhea.  No hematochezia or melena.  Pain is said to be intermittent and come and go.  Patient denies right chest discomfort or palpitations.  She states there has been some associated shortness of breath but she has a past medical history of bronchiectasis.  Patient denies fever or chills.  No recent sick contacts.  Patient is here for further evaluation.    External (non-ED) record review: Patient was admitted to the hospital in August 2024 for a fall with fracture to the left greater trochanter, diastolic CHF exacerbation.  Diastolic CHF is treated by the hospitalist.  Ortho evaluated the left greater trochanter fracture and the patient was treated with conservative measures.    Chronic or social conditions impacting care:    ALLERGIES  Amlodipine besylate, Aspirin, Bactrim [sulfamethoxazole-trimethoprim], Erythromycin, Levaquin [levofloxacin], Nitrofurantoin, and Ramipril    PAST MEDICAL HISTORY  Active Ambulatory Problems     Diagnosis Date Noted    Primary hypertension 12/01/2016    Nonobstructive atherosclerosis of coronary artery 12/01/2016    Familial hypercholesterolemia 12/01/2016    Mitral valve insufficiency 12/01/2016    Ventricular premature beats 12/01/2016    Ventricular  tachycardia 12/01/2016    Chronic respiratory failure with hypoxia 01/09/2017    DNR (do not resuscitate) 03/12/2017    Asymptomatic bacteriuria 04/03/2017    Iron deficiency anemia 04/04/2017    Hypokalemia 04/04/2017    Bronchiectasis 04/17/2017    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    Permanent atrial fibrillation 06/09/2017    Anxiety 06/20/2017    Medicare annual wellness visit, subsequent 04/18/2019    Herpes infection 11/19/2019    Abnormal EKG 12/16/2019    Alteration in anticoagulation     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    COPD (chronic obstructive pulmonary disease)     Chronic diastolic CHF (congestive heart failure) 11/27/2023    Orthostasis 07/30/2024    Nonrheumatic tricuspid valve regurgitation 07/30/2024    Pulmonary hypertension 07/30/2024    Pericardial effusion 07/30/2024    Fracture of greater trochanter of left femur 08/14/2024    Infectious disorder of bronchus 11/18/2024     Resolved Ambulatory Problems     Diagnosis Date Noted    Pneumothorax of right lung after biopsy 11/12/2016    Pulmonary aspergillosis 11/16/2016    Heart failure, diastolic, with acute decompensation 12/01/2016    Persistent atrial fibrillation 12/01/2016    Pneumonia 01/01/2017    Pneumonia with the fungal infection aspergillosis 01/06/2017    Acid-fast bacteria present 01/09/2017    Hyponatremia 02/08/2017    C. difficile colitis 03/11/2017    Sepsis 03/12/2017    Acute on chronic diastolic heart failure 03/12/2017    CHF (congestive heart failure) 03/22/2017    Pancolitis 04/02/2017    Pneumonia of both lungs due to infectious organism 05/31/2017    Exposure to hepatitis A 04/20/2018    Abdominal pain 09/23/2019    Moderate asthma with acute exacerbation 12/02/2019    Bronchitis, acute, with bronchospasm 12/16/2019    Acute bronchitis due to parainfluenza virus 12/16/2019    COPD with acute exacerbation  12/16/2019    UTI (urinary tract infection) 12/19/2019    Fall     Laceration of left upper extremity  07/16/2021    Multiple skin tears 07/16/2021    Clinical diagnosis of COVID-19 01/04/2022    Pneumonia of right lung due to infectious organism, unspecified part of lung 07/09/2022    Dizziness 09/09/2022    Bronchiectasis 02/20/2023    Sepsis due to pneumonia 04/03/2023    Acute pulmonary edema 08/04/2023    Electrolyte imbalance 08/16/2023    Acute on chronic diastolic congestive heart failure 10/04/2023    Acute on chronic diastolic heart failure 10/05/2023    CHF (congestive heart failure) 11/26/2023    Syncope and collapse 07/10/2024     Past Medical History:   Diagnosis Date    Aspergillus     Asthma     Atrial fibrillation     Atrial flutter     C. difficile diarrhea 03/11/2017    CAD (coronary artery disease)     Colitis     Cough     Cryoglobulinemia     Dyspnea on exertion     Hyperlipidemia     Hypertension     Hypoxia     Infectious viral hepatitis     Left shoulder pain     Leg swelling     Lesion of lung     Malignant hyperthermia due to anesthesia     Mild tricuspid regurgitation     MR (mitral regurgitation)     MVP (mitral valve prolapse)     Pneumothorax     SOB (shortness of breath)     Syncope 07/2024    Wheeze        PAST SURGICAL HISTORY  Past Surgical History:   Procedure Laterality Date    BRONCHOSCOPY N/A 11/12/2016    Procedure: BRONCHOSCOPY WITH FLUORO, BRUSHINGS, BAL, AND BIOPSIES;  Surgeon: Rogelio Tucker MD;  Location: [facility] ENDOSCOPY;  Service:     BRONCHOSCOPY Bilateral 06/03/2017    Procedure: BRONCHOSCOPY with BAL ;  Surgeon: Sung King MD;  Location: [facility] ENDOSCOPY;  Service:     BRONCHOSCOPY N/A 12/17/2019    Procedure: BRONCHOSCOPY WITH WASHINGS;  Surgeon: Rogelio Tucker MD;  Location: [facility] ENDOSCOPY;  Service: Pulmonary    BRONCHOSCOPY N/A 07/15/2022    Procedure: BRONCHOSCOPY;  Surgeon: Rogelio Tucker MD;  Location: [facility] ENDOSCOPY;  Service: Pulmonary;  Laterality: N/A;  Pre: Pneumonia  Post: Pneumonia    BRONCHOSCOPY N/A 10/2/2023    Procedure: BRONCHOSCOPY WITH BRONCHIAL  AVEOLAR LAVAGE;  Surgeon: Rogelio Tucker MD;  Location: [facility] ENDOSCOPY;  Service: Pulmonary;  Laterality: N/A;  PRE:BRONCHIECTASIS /   POST: SAME    CATARACT EXTRACTION EXTRACAPSULAR W/ INTRAOCULAR LENS IMPLANTATION      COLONOSCOPY      2013    D & C WITH SUCTION      HYSTERECTOMY      KNEE ARTHROSCOPY Left     Partial       FAMILY HISTORY  Family History   Problem Relation Age of Onset    Hypertension Mother     Stroke Mother     Heart disease Father     Hypertension Father     Cancer Brother     Cancer Son        SOCIAL HISTORY  Social History     Socioeconomic History    Marital status:    Tobacco Use    Smoking status: Never    Smokeless tobacco: Never    Tobacco comments:     caffiene daily   Vaping Use    Vaping status: Never Used   Substance and Sexual Activity    Alcohol use: Not Currently     Alcohol/week: 2.0 standard drinks of alcohol     Types: 1 Glasses of wine, 1 Shots of liquor per week    Drug use: No    Sexual activity: Defer     Partners: Male       REVIEW OF SYSTEMS  Review of Systems    All systems reviewed and negative except for those discussed in HPI.     PHYSICAL EXAM    I have reviewed the triage vital signs and nursing notes.  Vitals:    11/18/24 1810   BP: 147/76   Pulse: 88   Resp: 24   Temp: 98 °F (36.7 °C)   SpO2: 95%     Physical Exam    GENERAL: Frail in appearance, not toxic  HENT: nares patent  EYES: no scleral icterus  NECK: no ROM limitations  CV: regular rhythm, regular rate  RESPIRATORY: normal effort lungs CTAB  ABDOMEN: TTP diffusely, no guarding, no rebound, bowel sounds present but unremarkable.  : deferred  MUSCULOSKELETAL: no deformity  NEURO: alert, moves all extremities, follows commands  SKIN: warm, dry    LAB RESULTS  Recent Results (from the past 24 hours)   Urinalysis without microscopic (no culture) - Urine, Clean Catch    Collection Time: 11/18/24 12:32 PM    Specimen: Urine, Clean Catch   Result Value Ref Range    Color, UA Yellow Yellow, Straw     Appearance, UA Clear Clear    pH, UA 7.0 5.0 - 8.0    Specific Gravity, UA 1.010 1.005 - 1.030    Glucose, UA Negative Negative    Ketones, UA Negative Negative    Bilirubin, UA Negative Negative    Blood, UA Negative Negative    Protein, UA Negative Negative    Leuk Esterase, UA Negative Negative    Nitrite, UA Negative Negative    Urobilinogen, UA 0.2 E.U./dL 0.2 - 1.0 E.U./dL   ECG 12 Lead Chest Pain    Collection Time: 11/18/24 12:41 PM   Result Value Ref Range    QT Interval 309 ms    QTC Interval 350 ms   Comprehensive Metabolic Panel    Collection Time: 11/18/24 12:53 PM    Specimen: Blood   Result Value Ref Range    Glucose 130 (H) 65 - 99 mg/dL    BUN 11 8 - 23 mg/dL    Creatinine 0.85 0.57 - 1.00 mg/dL    Sodium 137 136 - 145 mmol/L    Potassium 4.1 3.5 - 5.2 mmol/L    Chloride 100 98 - 107 mmol/L    CO2 28.4 22.0 - 29.0 mmol/L    Calcium 9.2 8.2 - 9.6 mg/dL    Total Protein 6.4 6.0 - 8.5 g/dL    Albumin 3.7 3.5 - 5.2 g/dL    ALT (SGPT) 19 1 - 33 U/L    AST (SGOT) 15 1 - 32 U/L    Alkaline Phosphatase 99 39 - 117 U/L    Total Bilirubin 0.5 0.0 - 1.2 mg/dL    Globulin 2.7 gm/dL    A/G Ratio 1.4 g/dL    BUN/Creatinine Ratio 12.9 7.0 - 25.0    Anion Gap 8.6 5.0 - 15.0 mmol/L    eGFR 63.6 >60.0 mL/min/1.73   Lipase    Collection Time: 11/18/24 12:53 PM    Specimen: Blood   Result Value Ref Range    Lipase 46 13 - 60 U/L   Lactic Acid, Plasma    Collection Time: 11/18/24 12:53 PM    Specimen: Blood   Result Value Ref Range    Lactate 1.8 0.5 - 2.0 mmol/L   High Sensitivity Troponin T    Collection Time: 11/18/24 12:53 PM    Specimen: Blood   Result Value Ref Range    HS Troponin T 35 (H) <14 ng/L   CBC Auto Differential    Collection Time: 11/18/24  1:38 PM    Specimen: Blood   Result Value Ref Range    WBC 11.59 (H) 3.40 - 10.80 10*3/mm3    RBC 3.13 (L) 3.77 - 5.28 10*6/mm3    Hemoglobin 10.1 (L) 12.0 - 15.9 g/dL    Hematocrit 33.3 (L) 34.0 - 46.6 %    .4 (H) 79.0 - 97.0 fL    MCH 32.3 26.6 - 33.0 pg     MCHC 30.3 (L) 31.5 - 35.7 g/dL    RDW 15.2 12.3 - 15.4 %    RDW-SD 60.0 (H) 37.0 - 54.0 fl    MPV 10.8 6.0 - 12.0 fL    Platelets 293 140 - 450 10*3/mm3    Neutrophil % 81.7 (H) 42.7 - 76.0 %    Lymphocyte % 9.9 (L) 19.6 - 45.3 %    Monocyte % 6.0 5.0 - 12.0 %    Eosinophil % 0.5 0.3 - 6.2 %    Basophil % 0.3 0.0 - 1.5 %    Immature Grans % 1.6 (H) 0.0 - 0.5 %    Neutrophils, Absolute 9.48 (H) 1.70 - 7.00 10*3/mm3    Lymphocytes, Absolute 1.15 0.70 - 3.10 10*3/mm3    Monocytes, Absolute 0.69 0.10 - 0.90 10*3/mm3    Eosinophils, Absolute 0.06 0.00 - 0.40 10*3/mm3    Basophils, Absolute 0.03 0.00 - 0.20 10*3/mm3    Immature Grans, Absolute 0.18 (H) 0.00 - 0.05 10*3/mm3   High Sensitivity Troponin T 2Hr    Collection Time: 11/18/24  3:34 PM    Specimen: Blood   Result Value Ref Range    HS Troponin T 28 (H) <14 ng/L    Troponin T Delta -7 (L) >=-4 - <+4 ng/L       Ordered the above labs and independently interpreted results.  My findings will be discussed in the ED course or medical decision making section below    RADIOLOGY RESULTS  CT Abdomen Pelvis With Contrast, CT Angiogram Chest    Result Date: 11/18/2024  CT ANGIOGRAM CHEST-, CT ABDOMEN PELVIS W CONTRAST-  INDICATION: Chest pain, evaluate aorta. Abdominal pain.  COMPARISON: CT chest December 28, 2023 and CT abdomen pelvis January 4, 2019  TECHNIQUE: CTA chest with IV contrast. CT abdomen pelvis with contrast. Coronal and sagittal reformats. Three dimensional reconstructions. Radiation dose reduction techniques were utilized, including automated exposure control and exposure modulation based on body size.  FINDINGS:  Chest wall: No lymphadenopathy.  Mediastinum: Coronary artery atherosclerotic calcifications. Severe cardiomegaly. No pericardial effusion. No thoracic aortic aneurysm or dissection seen. Calcified mediastinal and left hilar lymph nodes, consistent with prior granulomatous infection. Mild mediastinal adenopathy. For example, paratracheal/4R lymph  node, series 4, axial mage 50, measures 1 cm. For example, left paratracheal lymph node, series 4, axial mage 52, measures 1 cm. Right paraesophageal/infrahilar lymph node, series 4, axial mage 75, measures 1.5 cm. No hilar lymphadenopathy.  Lungs/pleura: Trace right pleural fluid. Airways wall thickening. Biapical pleural-parenchymal thickening. Multifocal pleural parenchymal scarring. Partial atelectasis or scarring in the right middle lobe and lingula. Bilateral centrilobular and tree-in-bud nodules. Scattered subsegmental airway impactions. Calcified pulmonary nodule in the superior segment left lower lobe, consistent with prior granulomatous infection. Inferior right upper lobe opacity, with volume loss, suspect subsegmental atelectasis or scarring.  ABDOMEN: Multiple fluid attenuating hepatic cysts. Possible hepatic steatosis. Calcifications in the liver and spleen, consistent with prior granulomatous infection. Gallbladder full of gallstones. No gallbladder wall thickening or surrounding inflammation. Gallbladder is partially contracted. No biliary ductal dilatation. Spleen is normal in size. Incidental splenule. No pancreatic mass or pancreatic ductal dilatation seen. No adrenal nodules. Large fluid attenuating cyst in the left mid kidney measures 6.8 cm. No solid-appearing renal mass or hydronephrosis.  Pelvis: Underdistended bladder. No bladder calculus. Hysterectomy. Left ovarian cyst, series 2, axial mage 97, measures 2.3 cm, stable, likely benign.  Bowel: No obstruction. Colonic diverticulosis. Appendix not identified though no secondary findings of appendicitis.  Abdominal wall: Rectus diastases. Small fat-containing umbilical hernia. Pelvic wall scarring. Small fat-containing left inguinal hernia.  Retroperitoneum: No lymphadenopathy.  Vasculature: Patent. Ectasia of the infrarenal abdominal aorta measuring 2.1 cm. Severe aortoiliac atherosclerotic calcification.  Osseous structures: Old left distal  clavicular fracture. Old biconcave compression deformities seen at T11 and T12, unchanged. Suspect old superior plate compression deformity at T7 with 40% collapse is new in the interval. Old left femoral greater trochanter fracture. Old left inferior pubic ramus fracture. Bilateral hip osteoarthritis. Lumbar facet degenerative arthropathy with grade 1 anterolisthesis of L3 on L4.        1. No thoracic aortic aneurysm or dissection. 2. Severe cardiomegaly. 3. Airways disease with multifocal subsegmental airway occlusions and centrilobular and tree-in-bud nodules, similar to prior. Finding may be related to infectious bronchiolitis such as could be seen with MAC type infection or related to aspiration. 4. No acute findings identified in the abdomen and pelvis. 5. Gallbladder full of gallstones without gallbladder wall thickening or surrounding inflammation. 6. Colonic diverticulosis  This report was finalized on 11/18/2024 1:57 PM by Dr. Tres Lopez M.D on Workstation: [id number]        Ordered the above noted radiological studies.  Independently interpreted by me.  My findings will be discussed in the medical decision section below.     PROGRESS, DATA ANALYSIS, CONSULTS AND MEDICAL DECISION MAKING    Please note that this section constitutes my independent interpretation of clinical data including lab results, radiology, EKG's.  This constitutes my independent professional opinion regarding differential diagnosis and management of this patient.  It may include any factors such as history from outside sources, review of external records, social determinants of health, management of medications, response to those treatments, and discussions with other providers.    ED Course as of 11/18/24 2242   Mon Nov 18, 2024   1417 1. No thoracic aortic aneurysm or dissection.  2. Severe cardiomegaly.  3. Airways disease with multifocal subsegmental airway occlusions and  centrilobular and tree-in-bud nodules, similar to  prior. Finding may be  related to infectious bronchiolitis such as could be seen with MAC type  infection or related to aspiration.  4. No acute findings identified in the abdomen and pelvis.  5. Gallbladder full of gallstones without gallbladder wall thickening or  surrounding inflammation.  6. Colonic diverticulosis     This report was finalized on 11/18/2024 1:57 PM by Dr. Tres Lopez M.D on Workstation: [id number]   [RC]   1417 Lactate: 1.8 [RC]   1539 WBC(!): 11.59  Appears close to baseline [RC]   1634 HS Troponin T(!): 28 [RC]   1634 Patient has multiple gallstones in her gallbladder without definite signs of inflammation.  CBC appears baseline for patient.  Liver enzymes and total bilirubin unremarkable.  Patient's pain is somewhat epigastric as well.  She has ischemic changes on her ECG does appear to at least be chronic since 8/14/2024.  Patient is 89 years old and her pain is intermittent and ongoing.  I feel she be better served by admission to the hospital where she can be further evaluated by surgery and GI and possibly cardiology for the ischemic noted on ECG.  I discussed the patient's case with Dr. Vinicio UNDERWOOD who will accept the patient to telemetry bed at this time. [RC]      ED Course User Index  [RC] Jay Giles III, PA     Orders placed during this visit:  Orders Placed This Encounter   Procedures    Respiratory Panel PCR w/COVID-19(SARS-CoV-2) ANAI/JORGE/THOM/PAD/COR/NIKHIL In-House, NP Swab in UTM/VTM, 2 HR TAT - Swab, Nasopharynx    Respiratory Culture - Sputum, Cough    CT Abdomen Pelvis With Contrast    CT Angiogram Chest    Comprehensive Metabolic Panel    Lipase    Urinalysis without microscopic (no culture) - Urine, Clean Catch    Lactic Acid, Plasma    High Sensitivity Troponin T    CBC Auto Differential    High Sensitivity Troponin T 2Hr    Procalcitonin    CBC (No Diff)    Comprehensive Metabolic Panel    High Sensitivity Troponin T    Diet: Cardiac; Healthy Heart (2-3  Na+); Fluid Consistency: Thin (IDDSI 0)    Nursing Dysphagia Screening (Complete Prior to Giving Anything By Mouth)    Vital Signs    Telemetry - Place Orders & Notify Provider of Results When Patient Experiences Acute Chest Pain, Dysrhythmia or Respiratory Distress    May Be Off Telemetry for Tests    Notify Provider (With Default Parameters)    Up With Assistance    Intake & Output    Weigh Patient    Oral Care    Saline Lock & Maintain IV Access    Place Sequential Compression Device    Maintain Sequential Compression Device    Continuous Pulse Oximetry    Code Status and Medical Interventions: No CPR (Do Not Attempt to Resuscitate); Limited Support; No intubation (DNI)    Inpatient Spiritual Care Consult    Inpatient Case Management  Consult    Inpatient Palliative Care MD Consult    PT Consult: Eval & Treat Discharge Placement Assessment    Oxygen Therapy- Nasal Cannula; Titrate 1-6 LPM Per SpO2; 90 - 95%    ECG 12 Lead Chest Pain    Insert Peripheral IV    Inpatient Admission    Fall Precautions    CBC & Differential    ED Acknowledgement Form Needed;            Medical Decision Making    Gastritis, duodenitis, acute cholecystitis, acute pancreatitis, ACS, aortic dissection, PE, PNA, other pulmonary or intra-abdominal process.  Will obtain CBC, CMP, lipase, UA, troponin x 2, EKG, CTA of the chest and CT of the abdomen pelvis to further evaluate.  See ED course for MDM process.      DIAGNOSIS  Final diagnoses:   Generalized abdominal pain   Abnormal EKG   Gallstones   Epigastric pain          Medication List        Changed      bumetanide 2 MG tablet  Commonly known as: BUMEX  TAKE 1 TABLET TWICE A DAY FOR CARDIAC FAILURE, EDEMA  What changed: See the new instructions.     digoxin 125 MCG tablet  Commonly known as: LANOXIN  Take 1 tablet by mouth Every Other Day. Indications: Atrial Fibrillation, Cardiac Failure  What changed: additional instructions              FOLLOW-UP  No follow-up provider  specified.      Latest Documented Vital Signs:  As of 22:42 EST  BP- 147/76 HR- 88 Temp- 98 °F (36.7 °C) (Oral) O2 sat- 95%    Appropriate PPE utilized throughout this patient encounter to include mask, if indicated, per current protocol. Hand hygiene was performed before donning PPE and after removal when leaving the room.    Please note that portions of this were completed with a voice recognition program.     Note Disclaimer: At [facility], we believe that sharing information builds trust and better relationships. You are receiving this note because you are receiving care at [facility] or recently visited. It is possible you will see health information before a provider has talked with you about it. This kind of information can be easy to misunderstand. To help you fully understand what it means for your health, we urge you to discuss this note with your provider.

## 2024-11-19 ENCOUNTER — READMISSION MANAGEMENT (OUTPATIENT)
Dept: CALL CENTER | Facility: HOSPITAL | Age: 89
End: 2024-11-19
Payer: MEDICARE

## 2024-11-19 VITALS
OXYGEN SATURATION: 100 % | HEART RATE: 88 BPM | SYSTOLIC BLOOD PRESSURE: 134 MMHG | DIASTOLIC BLOOD PRESSURE: 94 MMHG | BODY MASS INDEX: 22.03 KG/M2 | RESPIRATION RATE: 24 BRPM | HEIGHT: 63 IN | TEMPERATURE: 97.3 F | WEIGHT: 124.34 LBS

## 2024-11-19 PROBLEM — B34.8 RHINOVIRUS INFECTION: Status: ACTIVE | Noted: 2024-11-19

## 2024-11-19 PROBLEM — R10.9 ABDOMINAL PAIN: Status: RESOLVED | Noted: 2024-11-18 | Resolved: 2024-11-19

## 2024-11-19 PROBLEM — K80.20 CALCULUS OF GALLBLADDER WITHOUT CHOLECYSTITIS WITHOUT OBSTRUCTION: Status: ACTIVE | Noted: 2024-11-19

## 2024-11-19 LAB
ALBUMIN SERPL-MCNC: 3.5 G/DL (ref 3.5–5.2)
ALBUMIN/GLOB SERPL: 1.2 G/DL
ALP SERPL-CCNC: 96 U/L (ref 39–117)
ALT SERPL W P-5'-P-CCNC: 19 U/L (ref 1–33)
ANION GAP SERPL CALCULATED.3IONS-SCNC: 11.7 MMOL/L (ref 5–15)
AST SERPL-CCNC: 22 U/L (ref 1–32)
B PARAPERT DNA SPEC QL NAA+PROBE: NOT DETECTED
B PERT DNA SPEC QL NAA+PROBE: NOT DETECTED
BILIRUB SERPL-MCNC: 0.4 MG/DL (ref 0–1.2)
BUN SERPL-MCNC: 13 MG/DL (ref 8–23)
BUN/CREAT SERPL: 15.1 (ref 7–25)
C PNEUM DNA NPH QL NAA+NON-PROBE: NOT DETECTED
CALCIUM SPEC-SCNC: 9.1 MG/DL (ref 8.2–9.6)
CHLORIDE SERPL-SCNC: 103 MMOL/L (ref 98–107)
CO2 SERPL-SCNC: 26.3 MMOL/L (ref 22–29)
CREAT SERPL-MCNC: 0.86 MG/DL (ref 0.57–1)
DEPRECATED RDW RBC AUTO: 49.5 FL (ref 37–54)
DIGOXIN SERPL-MCNC: 1 NG/ML (ref 0.6–1.2)
EGFRCR SERPLBLD CKD-EPI 2021: 62.7 ML/MIN/1.73
ERYTHROCYTE [DISTWIDTH] IN BLOOD BY AUTOMATED COUNT: 14 % (ref 12.3–15.4)
FLUAV SUBTYP SPEC NAA+PROBE: NOT DETECTED
FLUBV RNA ISLT QL NAA+PROBE: NOT DETECTED
GLOBULIN UR ELPH-MCNC: 2.9 GM/DL
GLUCOSE SERPL-MCNC: 99 MG/DL (ref 65–99)
HADV DNA SPEC NAA+PROBE: NOT DETECTED
HCOV 229E RNA SPEC QL NAA+PROBE: NOT DETECTED
HCOV HKU1 RNA SPEC QL NAA+PROBE: NOT DETECTED
HCOV NL63 RNA SPEC QL NAA+PROBE: NOT DETECTED
HCOV OC43 RNA SPEC QL NAA+PROBE: NOT DETECTED
HCT VFR BLD AUTO: 30.5 % (ref 34–46.6)
HGB BLD-MCNC: 10 G/DL (ref 12–15.9)
HMPV RNA NPH QL NAA+NON-PROBE: NOT DETECTED
HPIV1 RNA ISLT QL NAA+PROBE: NOT DETECTED
HPIV2 RNA SPEC QL NAA+PROBE: NOT DETECTED
HPIV3 RNA NPH QL NAA+PROBE: NOT DETECTED
HPIV4 P GENE NPH QL NAA+PROBE: NOT DETECTED
M PNEUMO IGG SER IA-ACNC: NOT DETECTED
MCH RBC QN AUTO: 32.2 PG (ref 26.6–33)
MCHC RBC AUTO-ENTMCNC: 32.8 G/DL (ref 31.5–35.7)
MCV RBC AUTO: 98.1 FL (ref 79–97)
PLATELET # BLD AUTO: 243 10*3/MM3 (ref 140–450)
PMV BLD AUTO: 10.7 FL (ref 6–12)
POTASSIUM SERPL-SCNC: 3.9 MMOL/L (ref 3.5–5.2)
PROT SERPL-MCNC: 6.4 G/DL (ref 6–8.5)
RBC # BLD AUTO: 3.11 10*6/MM3 (ref 3.77–5.28)
RHINOVIRUS RNA SPEC NAA+PROBE: DETECTED
RSV RNA NPH QL NAA+NON-PROBE: NOT DETECTED
SARS-COV-2 RNA NPH QL NAA+NON-PROBE: NOT DETECTED
SODIUM SERPL-SCNC: 141 MMOL/L (ref 136–145)
TROPONIN T SERPL HS-MCNC: 32 NG/L
WBC NRBC COR # BLD AUTO: 12.63 10*3/MM3 (ref 3.4–10.8)

## 2024-11-19 PROCEDURE — 97530 THERAPEUTIC ACTIVITIES: CPT

## 2024-11-19 PROCEDURE — 94799 UNLISTED PULMONARY SVC/PX: CPT

## 2024-11-19 PROCEDURE — G0378 HOSPITAL OBSERVATION PER HR: HCPCS

## 2024-11-19 PROCEDURE — 80053 COMPREHEN METABOLIC PANEL: CPT | Performed by: NURSE PRACTITIONER

## 2024-11-19 PROCEDURE — 94664 DEMO&/EVAL PT USE INHALER: CPT

## 2024-11-19 PROCEDURE — 85027 COMPLETE CBC AUTOMATED: CPT | Performed by: NURSE PRACTITIONER

## 2024-11-19 PROCEDURE — 80162 ASSAY OF DIGOXIN TOTAL: CPT | Performed by: HOSPITALIST

## 2024-11-19 PROCEDURE — 63710000001 PREDNISONE PER 1 MG: Performed by: HOSPITALIST

## 2024-11-19 PROCEDURE — 97162 PT EVAL MOD COMPLEX 30 MIN: CPT

## 2024-11-19 PROCEDURE — 94761 N-INVAS EAR/PLS OXIMETRY MLT: CPT

## 2024-11-19 PROCEDURE — 84484 ASSAY OF TROPONIN QUANT: CPT | Performed by: NURSE PRACTITIONER

## 2024-11-19 RX ORDER — SERTRALINE HYDROCHLORIDE 25 MG/1
25 TABLET, FILM COATED ORAL DAILY
Status: DISCONTINUED | OUTPATIENT
Start: 2024-11-19 | End: 2024-11-19 | Stop reason: HOSPADM

## 2024-11-19 RX ORDER — BENZONATATE 100 MG/1
200 CAPSULE ORAL 3 TIMES DAILY PRN
Status: DISCONTINUED | OUTPATIENT
Start: 2024-11-19 | End: 2024-11-19 | Stop reason: HOSPADM

## 2024-11-19 RX ORDER — CARVEDILOL 6.25 MG/1
3.12 TABLET ORAL 2 TIMES DAILY WITH MEALS
Status: DISCONTINUED | OUTPATIENT
Start: 2024-11-19 | End: 2024-11-19 | Stop reason: HOSPADM

## 2024-11-19 RX ORDER — HYDROCODONE BITARTRATE AND ACETAMINOPHEN 5; 325 MG/1; MG/1
1 TABLET ORAL EVERY 6 HOURS PRN
Status: DISCONTINUED | OUTPATIENT
Start: 2024-11-19 | End: 2024-11-19 | Stop reason: HOSPADM

## 2024-11-19 RX ORDER — AZITHROMYCIN 250 MG/1
250 TABLET, FILM COATED ORAL DAILY
Status: DISCONTINUED | OUTPATIENT
Start: 2024-11-19 | End: 2024-11-19 | Stop reason: HOSPADM

## 2024-11-19 RX ORDER — BUMETANIDE 1 MG/1
2 TABLET ORAL DAILY
Status: DISCONTINUED | OUTPATIENT
Start: 2024-11-19 | End: 2024-11-19 | Stop reason: HOSPADM

## 2024-11-19 RX ORDER — POTASSIUM CHLORIDE 750 MG/1
20 TABLET, FILM COATED, EXTENDED RELEASE ORAL DAILY
Status: DISCONTINUED | OUTPATIENT
Start: 2024-11-19 | End: 2024-11-19 | Stop reason: HOSPADM

## 2024-11-19 RX ORDER — DIGOXIN 125 MCG
125 TABLET ORAL EVERY OTHER DAY
Status: DISCONTINUED | OUTPATIENT
Start: 2024-11-19 | End: 2024-11-19 | Stop reason: HOSPADM

## 2024-11-19 RX ORDER — PREDNISONE 20 MG/1
10 TABLET ORAL DAILY
Status: DISCONTINUED | OUTPATIENT
Start: 2024-11-19 | End: 2024-11-19 | Stop reason: HOSPADM

## 2024-11-19 RX ORDER — SODIUM CHLORIDE FOR INHALATION 7 %
4 VIAL, NEBULIZER (ML) INHALATION 2 TIMES DAILY
Status: DISCONTINUED | OUTPATIENT
Start: 2024-11-19 | End: 2024-11-19 | Stop reason: HOSPADM

## 2024-11-19 RX ORDER — GUAIFENESIN 600 MG/1
600 TABLET, EXTENDED RELEASE ORAL 2 TIMES DAILY
Status: DISCONTINUED | OUTPATIENT
Start: 2024-11-19 | End: 2024-11-19 | Stop reason: HOSPADM

## 2024-11-19 RX ORDER — BUDESONIDE AND FORMOTEROL FUMARATE DIHYDRATE 160; 4.5 UG/1; UG/1
2 AEROSOL RESPIRATORY (INHALATION)
Status: DISCONTINUED | OUTPATIENT
Start: 2024-11-19 | End: 2024-11-19 | Stop reason: HOSPADM

## 2024-11-19 RX ADMIN — POTASSIUM CHLORIDE 20 MEQ: 750 TABLET, EXTENDED RELEASE ORAL at 12:27

## 2024-11-19 RX ADMIN — PREDNISONE 10 MG: 20 TABLET ORAL at 12:29

## 2024-11-19 RX ADMIN — Medication 10 ML: at 12:30

## 2024-11-19 RX ADMIN — Medication 4 ML: at 11:24

## 2024-11-19 RX ADMIN — AZITHROMYCIN 250 MG: 250 TABLET, FILM COATED ORAL at 12:29

## 2024-11-19 RX ADMIN — SERTRALINE HYDROCHLORIDE 25 MG: 25 TABLET ORAL at 12:30

## 2024-11-19 RX ADMIN — GUAIFENESIN 600 MG: 600 TABLET, EXTENDED RELEASE ORAL at 12:29

## 2024-11-19 RX ADMIN — BUMETANIDE 2 MG: 1 TABLET ORAL at 12:27

## 2024-11-19 RX ADMIN — ACETAMINOPHEN 325MG 650 MG: 325 TABLET ORAL at 01:17

## 2024-11-19 RX ADMIN — HYDROCODONE BITARTRATE AND ACETAMINOPHEN 1 TABLET: 5; 325 TABLET ORAL at 04:24

## 2024-11-19 RX ADMIN — IPRATROPIUM BROMIDE AND ALBUTEROL SULFATE 3 ML: 2.5; .5 SOLUTION RESPIRATORY (INHALATION) at 07:09

## 2024-11-19 RX ADMIN — ACETAMINOPHEN 325MG 650 MG: 325 TABLET ORAL at 12:35

## 2024-11-19 RX ADMIN — DIGOXIN 125 MCG: 125 TABLET ORAL at 12:29

## 2024-11-19 RX ADMIN — IPRATROPIUM BROMIDE AND ALBUTEROL SULFATE 3 ML: 2.5; .5 SOLUTION RESPIRATORY (INHALATION) at 11:16

## 2024-11-19 RX ADMIN — CARVEDILOL 3.12 MG: 6.25 TABLET, FILM COATED ORAL at 12:28

## 2024-11-19 RX ADMIN — MUPIROCIN 1 APPLICATION: 20 OINTMENT TOPICAL at 04:24

## 2024-11-19 NOTE — PLAN OF CARE
Goal Outcome Evaluation:  Plan of Care Reviewed With: patient        Progress: improving  Outcome Evaluation: Pt is a 89 y/o F with h/o HTN, CHF, COPD, and a-fib who presented to [facility] from her intermediate with c/o abdominal pain, feeling ill, and interscapular pain in the mid upper back. Pt reports she is primarily (I) w/ a RW, but does have supervision w/ showering/ADLs and is Ax1 to walk to the dining room. On arrival pt was sitting EOB, eating her breaking (I)'ly. Pt was SBA for STS to the RW and SBA for ambulation of 150' w/ RW. Pt denied having any SOB or dizziness during the session and did not demo any significant balance or gait deficits. Pt reports feeling at her mobility baseline, but does feel a little weaker d/t feeling ill. Pt reports no questions or concerns for return to [facility] and feels okay with DC back to intermediate. Encouraged pt to sit UIC for all meals and ambulate around the unit at least TID w/ nsg assist; pt v/u. No further skilled PT services required as pt appears at her baseline. SAJI w/ RN. PT will sign-off at this time and rec return to her intermediate and cont PT/OT. Thank you.    Anticipated Discharge Disposition (PT): assisted living (Return to her intermediate and cont PT.)

## 2024-11-19 NOTE — THERAPY EVALUATION
Patient Name: Agnieszka Rizzo  : 1930    MRN: [id number]                              Today's Date: 2024       Admit Date: 2024    Visit Dx:     ICD-10-CM ICD-9-CM   1. Generalized abdominal pain  R10.84 789.07   2. Abnormal EKG  R94.31 794.31   3. Gallstones  K80.20 574.20   4. Epigastric pain  R10.13 789.06     Patient Active Problem List   Diagnosis    Primary hypertension    Nonobstructive atherosclerosis of coronary artery    Familial hypercholesterolemia    Mitral valve insufficiency    Ventricular premature beats    Ventricular tachycardia    Chronic respiratory failure with hypoxia    DNR (do not resuscitate)    Asymptomatic bacteriuria    Iron deficiency anemia    Hypokalemia    Bronchiectasis    KINA (mycobacterium avium-intracellulare)    Permanent atrial fibrillation    Anxiety    Medicare annual wellness visit, subsequent    Herpes infection    Abnormal EKG    Alteration in anticoagulation    Blind right eye    COPD (chronic obstructive pulmonary disease)    Chronic diastolic CHF (congestive heart failure)    Orthostasis    Nonrheumatic tricuspid valve regurgitation    Pulmonary hypertension    Pericardial effusion    Fracture of greater trochanter of left femur    Abdominal pain    Leukocytosis    Anemia    Infectious disorder of bronchus     Past Medical History:   Diagnosis Date    Aspergillus     Asthma     Atrial fibrillation     chronic    Atrial flutter     Bronchiectasis     C. difficile diarrhea 2017    CAD (coronary artery disease)     nonobstructive    Chronic diastolic CHF (congestive heart failure)     Clinical diagnosis of COVID-19 2022    Colitis     Cough     Cryoglobulinemia     Dyspnea on exertion     Exposure to hepatitis A 2018    Fall     Hyperlipidemia     Hypertension     Hyponatremia     Hypoxia     Infectious viral hepatitis     AGE 13    Left shoulder pain     Leg swelling     Lesion of lung     Malignant hyperthermia due to anesthesia      Mild tricuspid regurgitation     MR (mitral regurgitation)     mild    MVP (mitral valve prolapse)     Permanent atrial fibrillation     Pneumonia of both lungs due to infectious organism 05/31/2017    Followed BY ID for MAC      Pneumonia with the fungal infection aspergillosis 01/06/2017    Pneumothorax     SOB (shortness of breath)     Syncope 07/2024    UTI (urinary tract infection)     Wheeze     mild     Past Surgical History:   Procedure Laterality Date    BRONCHOSCOPY N/A 11/12/2016    Procedure: BRONCHOSCOPY WITH FLUORO, BRUSHINGS, BAL, AND BIOPSIES;  Surgeon: Rogelio Tucker MD;  Location: [facility] ENDOSCOPY;  Service:     BRONCHOSCOPY Bilateral 06/03/2017    Procedure: BRONCHOSCOPY with BAL ;  Surgeon: Sung King MD;  Location: [facility] ENDOSCOPY;  Service:     BRONCHOSCOPY N/A 12/17/2019    Procedure: BRONCHOSCOPY WITH WASHINGS;  Surgeon: Rogelio Tucker MD;  Location: [facility] ENDOSCOPY;  Service: Pulmonary    BRONCHOSCOPY N/A 07/15/2022    Procedure: BRONCHOSCOPY;  Surgeon: Rogelio Tucker MD;  Location: [facility] ENDOSCOPY;  Service: Pulmonary;  Laterality: N/A;  Pre: Pneumonia  Post: Pneumonia    BRONCHOSCOPY N/A 10/2/2023    Procedure: BRONCHOSCOPY WITH BRONCHIAL AVEOLAR LAVAGE;  Surgeon: Rogelio Tucker MD;  Location: [facility] ENDOSCOPY;  Service: Pulmonary;  Laterality: N/A;  PRE:BRONCHIECTASIS /   POST: SAME    CATARACT EXTRACTION EXTRACAPSULAR W/ INTRAOCULAR LENS IMPLANTATION      COLONOSCOPY      2013    D & C WITH SUCTION      HYSTERECTOMY      KNEE ARTHROSCOPY Left     Partial      General Information       Row Name 11/19/24 1041          Physical Therapy Time and Intention    Document Type evaluation  -MG     Mode of Treatment individual therapy;physical therapy  -MG       Row Name 11/19/24 1041          General Information    Patient Profile Reviewed yes  -MG     Prior Level of Function independent:  Uses RW. Says staff supervises her showers and supervises or sometimes helps w/ ADLs/mobility.  -MG     Existing  Precautions/Restrictions fall  -MG     Barriers to Rehab none identified  -MG       Row Name 11/19/24 1041          Living Environment    People in Home facility resident  [facility]  -MG       Row Name 11/19/24 1041          Home Main Entrance    Number of Stairs, Main Entrance none  -MG       Row Name 11/19/24 1041          Stairs Within Home, Primary    Number of Stairs, Within Home, Primary none  -MG       Row Name 11/19/24 1041          Cognition    Orientation Status (Cognition) oriented x 4  Forgetful  -MG       Row Name 11/19/24 1041          Safety Issues/Impairments Affecting Functional Mobility    Safety Issues Affecting Function (Mobility) insight into deficits/self-awareness  -MG     Impairments Affecting Function (Mobility) strength  -MG     Comment, Safety Issues/Impairments (Mobility) Gait belt and non-skid socks donned.  -MG               User Key  (r) = Recorded By, (t) = Taken By, (c) = Cosigned By      Initials Name Provider Type    Angelica Giraldo, PT Physical Therapist                   Mobility       Row Name 11/19/24 1044          Bed Mobility    Comment, (Bed Mobility) Sitting EOB on arrival.  -MG       Row Name 11/19/24 1044          Sit-Stand Transfer    Sit-Stand [place] (Transfers) standby assist  -MG     Assistive Device (Sit-Stand Transfers) walker, front-wheeled  -MG       Row Name 11/19/24 1044          Gait/Stairs (Locomotion)    [place] Level (Gait) standby assist  -MG     Assistive Device (Gait) walker, front-wheeled  -MG     Distance in Feet (Gait) 150  -MG     Deviations/Abnormal Patterns (Gait) gait speed decreased  -MG     Comment, (Gait/Stairs) Amb in room and around the unit. No significant balance or gait deficits. Denies any dizziness or SOB. Amb on room air satting 93% on return to room.  -MG               User Key  (r) = Recorded By, (t) = Taken By, (c) = Cosigned By      Initials Name Provider Type    Angelica Giraldo, PT Physical Therapist                    Obj/Interventions       Row Name 11/19/24 1048          Range of Motion Comprehensive    General Range of Motion no range of motion deficits identified  -MG       Row Name 11/19/24 1048          Strength Comprehensive (MMT)    General Manual Muscle Testing (MMT) Assessment lower extremity strength deficits identified;upper extremity strength deficits identified  -MG     Comment, General Manual Muscle Testing (MMT) Assessment Generalized weakness. Says she's at her baseline, but slightly weaker d/t not feeling well.  -MG       Row Name 11/19/24 1048          Balance    Balance Assessment sitting static balance;sitting dynamic balance;standing dynamic balance;standing static balance  -MG     Static Sitting Balance independent  -MG     Dynamic Sitting Balance modified independence  -MG     Position, Sitting Balance sitting edge of bed  -MG     Static Standing Balance supervision  -MG     Dynamic Standing Balance standby assist  -MG     Position/Device Used, Standing Balance walker, front-wheeled;supported  -MG       Row Name 11/19/24 1048          Sensory Assessment (Somatosensory)    Sensory Assessment (Somatosensory) sensation intact  -MG               User Key  (r) = Recorded By, (t) = Taken By, (c) = Cosigned By      Initials Name Provider Type    MG Angelica Henry, PT Physical Therapist                   Goals/Plan    No documentation.                  Clinical Impression       Row Name 11/19/24 1049          Pain    Pretreatment Pain Rating 0/10 - no pain  -MG     Posttreatment Pain Rating 0/10 - no pain  -MG       [facility] Name 11/19/24 1049          Plan of Care Review    Plan of Care Reviewed With patient  -MG     Progress improving  -MG     Outcome Evaluation Pt is a 89 y/o F with h/o HTN, CHF, COPD, and a-fib who presented to [facility] from her [facility] with c/o abdominal pain, feeling ill, and interscapular pain in the mid upper back. Pt reports she is primarily (I) w/ a RW, but does have supervision w/ showering/ADLs and  is Ax1 to walk to the dining room. On arrival pt was sitting EOB, eating her breaking (I)'ly. Pt was SBA for STS to the RW and SBA for ambulation of 150' w/ RW. Pt denied having any SOB or dizziness during the session and did not demo any significant balance or gait deficits. Pt reports feeling at her mobility baseline, but does feel a little weaker d/t feeling ill. Pt reports no questions or concerns for return to detention and feels okay with DC back to [facility]. Encouraged pt to sit UIC for all meals and ambulate around the unit at least TID w/ nsg assist; pt v/u. No further skilled PT services required as pt appears at her baseline. SBAR w/ RN. PT will sign-off at this time and rec return to her detention and cont PT/OT. Thank you.  -MG       Row Name 11/19/24 1049          Therapy Assessment/Plan (PT)    Criteria for Skilled Interventions Met (PT) no;does not meet criteria for skilled intervention  -MG     Therapy Frequency (PT) evaluation only  -MG       Row Name 11/19/24 1049          Vital Signs    Pretreatment Heart Rate (beats/min) 79  -MG     Pretreatment Resp Rate (breaths/min) 12  -MG     Pre SpO2 (%) 94  -MG     O2 Delivery Pre Treatment room air  -MG     O2 Delivery Intra Treatment room air  -MG     Post SpO2 (%) 93  -MG     O2 Delivery Post Treatment room air  -MG     Pre Patient Position Supine  -MG     Intra Patient Position Standing  -MG     Post Patient Position Sitting  -MG       Row Name 11/19/24 1049          Positioning and Restraints    Pre-Treatment Position in bed  -MG     Post Treatment Position chair  -MG     In Chair notified nsg;reclined;encouraged to call for assist;call light within reach;exit alarm on;legs elevated  -MG               User Key  (r) = Recorded By, (t) = Taken By, (c) = Cosigned By      Initials Name Provider Type    MG Angelica Henry, PT Physical Therapist                   Outcome Measures       Row Name 11/19/24 1057          How much help from another person do you currently  need...    Turning from your back to your side while in flat bed without using bedrails? 4  -MG     Moving from lying on back to sitting on the side of a flat bed without bedrails? 4  -MG     Moving to and from a bed to a chair (including a wheelchair)? 3  -MG     Standing up from a chair using your arms (e.g., wheelchair, bedside chair)? 3  -MG     Climbing 3-5 steps with a railing? 2  -MG     To walk in hospital room? 3  -MG     AM-PAC 6 Clicks Score (PT) 19  -MG               User Key  (r) = Recorded By, (t) = Taken By, (c) = Cosigned By      Initials Name Provider Type    MG Angelica Henry PT Physical Therapist                                 Physical Therapy Education       Title: PT OT SLP Therapies (In Progress)       Topic: Physical Therapy (In Progress)       Point: Mobility training (Not Started)       Learner Progress:  Not documented in this visit.              Point: Home exercise program (Not Started)       Learner Progress:  Not documented in this visit.              Point: Body mechanics (Done)       Learning Progress Summary            Patient Acceptance, E,TB, VU by  at 11/19/2024 1057                      Point: Precautions (Done)       Learning Progress Summary            Patient Acceptance, E,TB, VU by  at 11/19/2024 1057                                      User Key       Initials Effective Dates Name Provider Type Discipline     05/24/22 -  Angelica Henry PT Physical Therapist PT                  PT Recommendation and Plan     Progress: improving  Outcome Evaluation: Pt is a 89 y/o F with h/o HTN, CHF, COPD, and a-fib who presented to [facility] from her long term with c/o abdominal pain, feeling ill, and interscapular pain in the mid upper back. Pt reports she is primarily (I) w/ a RW, but does have supervision w/ showering/ADLs and is Ax1 to walk to the dining room. On arrival pt was sitting EOB, eating her breaking (I)'ly. Pt was SBA for STS to the RW and SBA for ambulation of 150' w/ RW. Pt  denied having any SOB or dizziness during the session and did not demo any significant balance or gait deficits. Pt reports feeling at her mobility baseline, but does feel a little weaker d/t feeling ill. Pt reports no questions or concerns for return to [facility] and feels okay with DC back to [facility]. Encouraged pt to sit UIC for all meals and ambulate around the unit at least TID w/ nsg assist; pt v/u. No further skilled PT services required as pt appears at her baseline. SBAR w/ RN. PT will sign-off at this time and rec return to her [facility] and cont PT/OT. Thank you.     Time Calculation:         PT Charges       Row Name 11/19/24 1057             Time Calculation    Start Time 0854  -MG      Stop Time 0920  -MG      Time Calculation (min) 26 min  -MG      PT Received On 11/19/24  -MG         Time Calculation- PT    Total Timed Code Minutes- PT 14 minute(s)  -MG                User Key  (r) = Recorded By, (t) = Taken By, (c) = Cosigned By      Initials Name Provider Type    MG Angelica Henry, PT Physical Therapist                  Therapy Charges for Today       Code Description Service Date Service Provider Modifiers Qty    92716163691 HC PT EVAL MOD COMPLEXITY 3 11/19/2024 Angelica Henry, PT GP 1    26103753814 HC PT THERAPEUTIC ACT EA 15 MIN 11/19/2024 Angelica Henry, PT GP 1            PT G-Codes  AM-PAC 6 Clicks Score (PT): 19  PT Discharge Summary  Anticipated Discharge Disposition (PT): assisted living (Return to her [facility] and cont PT.)    Angelica Henry PT  11/19/2024

## 2024-11-19 NOTE — CASE MANAGEMENT/SOCIAL WORK
"Continued Stay Note  [facility]     Patient Name: Agnieszka Rizzo  MRN: [id number]  Today's Date: 11/19/2024    Admit Date: 11/18/2024    Plan: home   Discharge Plan       Row Name 11/19/24 1348       Plan    Plan home    Plan Comments Spoke with patient at bedside. She refuses assessment stating, \" The dr says Im going home. \". Gave patient code 44 document and explained it to her. Verbalizes understanding.. Obtained signed copy and gave to Sana in CCP department    Final Discharge Disposition Code 01 - home or self-care    Final Note home                   Discharge Codes    No documentation.                 Expected Discharge Date and Time       Expected Discharge Date Expected Discharge Time    Nov 19, 2024               Qian Michael RN    "

## 2024-11-19 NOTE — PLAN OF CARE
Goal Outcome Evaluation:              Outcome Evaluation: pt w/ no abdominal pain overnight, but 8-9/10 pain at lumbar spine w/ changes in position. positive for rhinovirus, placed in droplet precautions. no fevers overnight. heart rate controlled. passed bedside swallow evaluation. refusing SCDs. a/o x4 with short term memory loss which is baseline. very cooperative. VSS.

## 2024-11-19 NOTE — DISCHARGE SUMMARY
Patient Name: Agnieszka Rizzo  : 1930  MRN: [id number]    Date of Admission: 2024  Date of Discharge:  2024  Primary Care Physician: Matt Rose MD      Chief Complaint:   Back Pain and Abdominal Pain      Discharge Diagnoses     Active Hospital Problems    Diagnosis  POA    Rhinovirus infection [B34.8]  Yes    Calculus of gallbladder without cholecystitis without obstruction [K80.20]  Yes    Leukocytosis [D72.829]  Yes    Anemia [D64.9]  Yes    COPD (chronic obstructive pulmonary disease) [J44.9]  Yes    Permanent atrial fibrillation [I48.21]  Yes    Bronchiectasis [J47.9]  Yes    Iron deficiency anemia [D50.9]  Yes    DNR (do not resuscitate) [Z66]  Yes    Chronic respiratory failure with hypoxia [J96.11]  Yes    Primary hypertension [I10]  Yes      Resolved Hospital Problems    Diagnosis Date Resolved POA    **Abdominal pain [R10.9] 2024 Yes        Hospital Course       Ms. Rizzo is a 89 y.o. with history of bronchiectasis and A-fib who presented with pain in her subscapular and epigastric area, findings of cholelithiasis without cholecystitis along with rhinovirus.  See H&P for details.  Her pain resolved overnight and she is asking to go home now.  See the H&P for full discussion.       Day of Discharge     Subjective:  See H&P    Physical Exam:  Temp:  [97.3 °F (36.3 °C)-98.2 °F (36.8 °C)] 97.3 °F (36.3 °C)  Heart Rate:  [68-94] 88  Resp:  [14-24] 24  BP: (121-147)/(70-94) 134/94  Body mass index is 22.03 kg/m².  Physical Exam    Consultants     Consult Orders (all) (From admission, onward)       Start     Ordered    24  Inpatient Palliative Care MD Consult  Once        Specialty:  Hospice and Palliative Medicine  Provider:  (Not yet assigned)    24  Inpatient Spiritual Care Consult  Once        Provider:  (Not yet assigned)    24  Inpatient Case Management  Consult  Once        Provider:  (Not  yet assigned)    11/18/24 2034                  Procedures     Imaging Results (All)       Procedure Component Value Units Date/Time    CT Abdomen Pelvis With Contrast [[id number]] Collected: 11/18/24 1328     Updated: 11/18/24 1400    Narrative:      CT ANGIOGRAM CHEST-, CT ABDOMEN PELVIS W CONTRAST-     INDICATION: Chest pain, evaluate aorta. Abdominal pain.     COMPARISON: CT chest December 28, 2023 and CT abdomen pelvis January 4, 2019     TECHNIQUE:  CTA chest with IV contrast. CT abdomen pelvis with contrast. Coronal and  sagittal reformats. Three dimensional reconstructions. Radiation dose  reduction techniques were utilized, including automated exposure control  and exposure modulation based on body size.     FINDINGS:      Chest wall: No lymphadenopathy.     Mediastinum: Coronary artery atherosclerotic calcifications. Severe  cardiomegaly. No pericardial effusion. No thoracic aortic aneurysm or  dissection seen. Calcified mediastinal and left hilar lymph nodes,  consistent with prior granulomatous infection. Mild mediastinal  adenopathy. For example, paratracheal/4R lymph node, series 4, axial  mage 50, measures 1 cm. For example, left paratracheal lymph node,  series 4, axial mage 52, measures 1 cm. Right paraesophageal/infrahilar  lymph node, series 4, axial mage 75, measures 1.5 cm. No hilar  lymphadenopathy.     Lungs/pleura: Trace right pleural fluid. Airways wall thickening.  Biapical pleural-parenchymal thickening. Multifocal pleural parenchymal  scarring. Partial atelectasis or scarring in the right middle lobe and  lingula. Bilateral centrilobular and tree-in-bud nodules. Scattered  subsegmental airway impactions. Calcified pulmonary nodule in the  superior segment left lower lobe, consistent with prior granulomatous  infection. Inferior right upper lobe opacity, with volume loss, suspect  subsegmental atelectasis or scarring.     ABDOMEN: Multiple fluid attenuating hepatic cysts. Possible  hepatic  steatosis. Calcifications in the liver and spleen, consistent with prior  granulomatous infection. Gallbladder full of gallstones. No gallbladder  wall thickening or surrounding inflammation. Gallbladder is partially  contracted. No biliary ductal dilatation. Spleen is normal in size.  Incidental splenule. No pancreatic mass or pancreatic ductal dilatation  seen. No adrenal nodules. Large fluid attenuating cyst in the left mid  kidney measures 6.8 cm. No solid-appearing renal mass or hydronephrosis.     Pelvis: Underdistended bladder. No bladder calculus. Hysterectomy. Left  ovarian cyst, series 2, axial mage 97, measures 2.3 cm, stable, likely  benign.     Bowel: No obstruction. Colonic diverticulosis. Appendix not identified  though no secondary findings of appendicitis.     Abdominal wall: Rectus diastases. Small fat-containing umbilical hernia.  Pelvic wall scarring. Small fat-containing left inguinal hernia.     Retroperitoneum: No lymphadenopathy.     Vasculature: Patent. Ectasia of the infrarenal abdominal aorta measuring  2.1 cm. Severe aortoiliac atherosclerotic calcification.     Osseous structures: Old left distal clavicular fracture. Old biconcave  compression deformities seen at T11 and T12, unchanged. Suspect old  superior plate compression deformity at T7 with 40% collapse is new in  the interval. Old left femoral greater trochanter fracture. Old left  inferior pubic ramus fracture. Bilateral hip osteoarthritis. Lumbar  facet degenerative arthropathy with grade 1 anterolisthesis of L3 on L4.          Impression:         1. No thoracic aortic aneurysm or dissection.  2. Severe cardiomegaly.  3. Airways disease with multifocal subsegmental airway occlusions and  centrilobular and tree-in-bud nodules, similar to prior. Finding may be  related to infectious bronchiolitis such as could be seen with MAC type  infection or related to aspiration.  4. No acute findings identified in the abdomen and  pelvis.  5. Gallbladder full of gallstones without gallbladder wall thickening or  surrounding inflammation.  6. Colonic diverticulosis     This report was finalized on 11/18/2024 1:57 PM by Dr. Tres Lopez M.D on Workstation: [id number]       CT Angiogram Chest [[id number]] Collected: 11/18/24 1328     Updated: 11/18/24 1400    Narrative:      CT ANGIOGRAM CHEST-, CT ABDOMEN PELVIS W CONTRAST-     INDICATION: Chest pain, evaluate aorta. Abdominal pain.     COMPARISON: CT chest December 28, 2023 and CT abdomen pelvis January 4, 2019     TECHNIQUE:  CTA chest with IV contrast. CT abdomen pelvis with contrast. Coronal and  sagittal reformats. Three dimensional reconstructions. Radiation dose  reduction techniques were utilized, including automated exposure control  and exposure modulation based on body size.     FINDINGS:      Chest wall: No lymphadenopathy.     Mediastinum: Coronary artery atherosclerotic calcifications. Severe  cardiomegaly. No pericardial effusion. No thoracic aortic aneurysm or  dissection seen. Calcified mediastinal and left hilar lymph nodes,  consistent with prior granulomatous infection. Mild mediastinal  adenopathy. For example, paratracheal/4R lymph node, series 4, axial  mage 50, measures 1 cm. For example, left paratracheal lymph node,  series 4, axial mage 52, measures 1 cm. Right paraesophageal/infrahilar  lymph node, series 4, axial mage 75, measures 1.5 cm. No hilar  lymphadenopathy.     Lungs/pleura: Trace right pleural fluid. Airways wall thickening.  Biapical pleural-parenchymal thickening. Multifocal pleural parenchymal  scarring. Partial atelectasis or scarring in the right middle lobe and  lingula. Bilateral centrilobular and tree-in-bud nodules. Scattered  subsegmental airway impactions. Calcified pulmonary nodule in the  superior segment left lower lobe, consistent with prior granulomatous  infection. Inferior right upper lobe opacity, with volume loss,  suspect  subsegmental atelectasis or scarring.     ABDOMEN: Multiple fluid attenuating hepatic cysts. Possible hepatic  steatosis. Calcifications in the liver and spleen, consistent with prior  granulomatous infection. Gallbladder full of gallstones. No gallbladder  wall thickening or surrounding inflammation. Gallbladder is partially  contracted. No biliary ductal dilatation. Spleen is normal in size.  Incidental splenule. No pancreatic mass or pancreatic ductal dilatation  seen. No adrenal nodules. Large fluid attenuating cyst in the left mid  kidney measures 6.8 cm. No solid-appearing renal mass or hydronephrosis.     Pelvis: Underdistended bladder. No bladder calculus. Hysterectomy. Left  ovarian cyst, series 2, axial mage 97, measures 2.3 cm, stable, likely  benign.     Bowel: No obstruction. Colonic diverticulosis. Appendix not identified  though no secondary findings of appendicitis.     Abdominal wall: Rectus diastases. Small fat-containing umbilical hernia.  Pelvic wall scarring. Small fat-containing left inguinal hernia.     Retroperitoneum: No lymphadenopathy.     Vasculature: Patent. Ectasia of the infrarenal abdominal aorta measuring  2.1 cm. Severe aortoiliac atherosclerotic calcification.     Osseous structures: Old left distal clavicular fracture. Old biconcave  compression deformities seen at T11 and T12, unchanged. Suspect old  superior plate compression deformity at T7 with 40% collapse is new in  the interval. Old left femoral greater trochanter fracture. Old left  inferior pubic ramus fracture. Bilateral hip osteoarthritis. Lumbar  facet degenerative arthropathy with grade 1 anterolisthesis of L3 on L4.          Impression:         1. No thoracic aortic aneurysm or dissection.  2. Severe cardiomegaly.  3. Airways disease with multifocal subsegmental airway occlusions and  centrilobular and tree-in-bud nodules, similar to prior. Finding may be  related to infectious bronchiolitis such as could be  "seen with MAC type  infection or related to aspiration.  4. No acute findings identified in the abdomen and pelvis.  5. Gallbladder full of gallstones without gallbladder wall thickening or  surrounding inflammation.  6. Colonic diverticulosis     This report was finalized on 11/18/2024 1:57 PM by Dr. Tres Lopez M.D on Workstation: [id number]                 Pertinent Labs     Results from last 7 days   Lab Units 11/19/24  0613 11/18/24  1338   WBC 10*3/mm3 12.63* 11.59*   HEMOGLOBIN g/dL 10.0* 10.1*   PLATELETS 10*3/mm3 243 293     Results from last 7 days   Lab Units 11/19/24  0613 11/18/24  1253   SODIUM mmol/L 141 137   POTASSIUM mmol/L 3.9 4.1   CHLORIDE mmol/L 103 100   CO2 mmol/L 26.3 28.4   BUN mg/dL 13 11   CREATININE mg/dL 0.86 0.85   GLUCOSE mg/dL 99 130*   EGFR mL/min/1.73 62.7 63.6     Results from last 7 days   Lab Units 11/19/24  0613 11/18/24  1253   ALBUMIN g/dL 3.5 3.7   BILIRUBIN mg/dL 0.4 0.5   ALK PHOS U/L 96 99   AST (SGOT) U/L 22 15   ALT (SGPT) U/L 19 19     Results from last 7 days   Lab Units 11/19/24  0613 11/18/24  1253   CALCIUM mg/dL 9.1 9.2   ALBUMIN g/dL 3.5 3.7     Results from last 7 days   Lab Units 11/18/24  1253   LIPASE U/L 46     Results from last 7 days   Lab Units 11/19/24  0613 11/18/24  1534 11/18/24  1253   HSTROP T ng/L 32* 28* 35*   DIGOXIN LVL ng/mL 1.00  --   --            Invalid input(s): \"LDLCALC\"      Results from last 7 days   Lab Units 11/18/24  2227   COVID19  Not Detected       Test Results Pending at Discharge     Pending Results       Procedure [Order ID] Specimen - Date/Time    Procalcitonin [[id number]]     Specimen: Blood     Respiratory Culture - Sputum, Cough [[id number]]     Specimen: Sputum from Cough               Discharge Details        Discharge Medications        Changes to Medications        Instructions Start Date   bumetanide 2 MG tablet  Commonly known as: BUMEX  What changed: See the new instructions.   TAKE 1 TABLET TWICE A DAY FOR " CARDIAC FAILURE, EDEMA      digoxin 125 MCG tablet  Commonly known as: LANOXIN  What changed: additional instructions   125 mcg, Oral, Every Other Day             Continue These Medications        Instructions Start Date   acetaminophen 325 MG tablet  Commonly known as: TYLENOL   2 tablets, Every 4 Hours PRN      albuterol sulfate  (90 Base) MCG/ACT inhaler  Commonly known as: PROVENTIL HFA;VENTOLIN HFA;PROAIR HFA   2 puffs, Inhalation, Every 6 Hours PRN      azithromycin 250 MG tablet  Commonly known as: ZITHROMAX   250 mg, Daily      benzonatate 200 MG capsule  Commonly known as: TESSALON   1 capsule, 3 Times Daily PRN      carvedilol 3.125 MG tablet  Commonly known as: COREG   3.125 mg, Oral, 2 Times Daily With Meals      Eliquis 2.5 MG tablet tablet  Generic drug: apixaban   TAKE 1 TABLET EVERY 12 HOURS, FULL ANTICOAGULATION      FeroSul 325 (65 Fe) MG tablet  Generic drug: ferrous sulfate   TAKE ONE TABLET BY MOUTH DAILY WITH BREAKFAST      fexofenadine 180 MG tablet  Commonly known as: ALLEGRA   1 tablet, Daily      Florajen Acidophilus capsule   1 tablet, Daily      fluticasone-salmeterol 115-21 MCG/ACT inhaler  Commonly known as: ADVAIR HFA   2 puffs, 2 Times Daily - RT      guaiFENesin 600 MG 12 hr tablet  Commonly known as: MUCINEX   1 tablet, 2 Times Daily      ipratropium-albuterol 0.5-2.5 mg/3 ml nebulizer  Commonly known as: DUO-NEB   3 mL, 2 Times Daily      multivitamin with minerals tablet tablet   1 tablet, 2 Times Daily      O2  Commonly known as: OXYGEN   2 L/min, Nightly      potassium chloride 20 MEQ CR tablet  Commonly known as: KLOR-CON M20   20 mEq, Oral, Daily      predniSONE 10 MG tablet  Commonly known as: DELTASONE   1 tablet, Daily      sodium chloride 7 % nebulizer solution nebulizer solution   4 mL, Nebulization, 2 Times Daily             ASK your doctor about these medications        Instructions Start Date   sertraline 25 MG tablet  Commonly known as: ZOLOFT   1 tablet,  Daily               Allergies   Allergen Reactions    Amlodipine Besylate Swelling    Aspirin GI Intolerance     Caused bleeding ulcers    Bactrim [Sulfamethoxazole-Trimethoprim] Nausea And Vomiting    Erythromycin Unknown (See Comments)     Patient does not recall type of reaction.    Levaquin [Levofloxacin] Unknown (See Comments)     Nausea      Nitrofurantoin Nausea Only and Nausea And Vomiting    Ramipril Other (See Comments)     Cough       Discharge Disposition:  Home or Self Care      Discharge Diet:  Diet Order   Procedures    Diet: Cardiac; Healthy Heart (2-3 Na+); Fluid Consistency: Thin (IDDSI 0)       Discharge Activity:       CODE STATUS:    Code Status and Medical Interventions: No CPR (Do Not Attempt to Resuscitate); Limited Support; No intubation (DNI)   Ordered at: 11/18/24 2823     Medical Intervention Limits:    No intubation (DNI)     Code Status (Patient has no pulse and is not breathing):    No CPR (Do Not Attempt to Resuscitate)     Medical Interventions (Patient has pulse or is breathing):    Limited Support       No future appointments.   Follow-up Information       Matt Rose MD .    Specialty: Family Medicine  Contact information:  [id number] [id number] [phone]                             Time Spent on Discharge:  Greater than 30 minutes      Karel Harvey MD  [facility]  11/19/24  12:53 EST

## 2024-11-19 NOTE — H&P
Patient Name:  Agnieszka Rizzo  YOB: 1930  MRN:  [id number]  Admit Date:  11/18/2024  Patient Care Team:  Matt Rose MD as PCP - General (Family Medicine)  Marcie Robbins MD as Cardiologist (Cardiology)  Nilesh Danielle MD as Consulting Physician (Urology)  Lina Morris RPH as Pharmacist  Lev Mckeon PharmD as Pharmacist (Pharmacy)  Rogelio Tucker MD as Consulting Physician (Pulmonary Disease)  Ria Hinton, DOLORES as Ambulatory  ([facility])      Subjective   History Present Illness     Chief Complaint   Patient presents with    Back Pain    Abdominal Pain       Ms. Rizzo is a 89 y.o. female with history of longstanding bronchiectasis, A-fib on Eliquis presenting to the hospital with complaints of back pain and upper abdominal pain for about 3 days.  She felt the pain between her scapula and the upper back, radiating around the right side to the epigastrium.  She did not have any nausea or vomiting.  Her bowel patterns have been normal.  She has not had any fevers or chills.  She has a chronic cough which is occasionally productive but this is not unusual for her.  She said that her pain was worse when she would move or lay on her right side.  She was seen in the freestanding ED and workup was significant only for some cholelithiasis seen on CT without any evidence for cholecystitis.  She had chronic bronchiectasis and severe cardiomegaly seen.  Her troponins were normal.  WBC was just mildly elevated and RVP was positive for rhinovirus.  She was admitted for further management.  Overnight her pain has basically resolved.  She does have some pain in her lower back but it is not severe.  She is eating well and has no complaints currently and is asking to go home.    Review of Systems   Constitutional:  Negative for chills, fatigue and fever.   HENT:  Negative for congestion and trouble swallowing.    Eyes:  Negative for visual disturbance.   Respiratory:   Negative for cough, chest tightness and shortness of breath.    Cardiovascular:  Negative for chest pain, palpitations and leg swelling.   Gastrointestinal:  Positive for abdominal distention and abdominal pain. Negative for constipation, diarrhea, nausea and vomiting.   Genitourinary:  Negative for difficulty urinating, dysuria and frequency.   Musculoskeletal:  Positive for back pain. Negative for arthralgias.   Skin:  Negative for rash.   Neurological:  Negative for dizziness and headaches.   Psychiatric/Behavioral:  Negative for confusion.         Personal History     Past Medical History:   Diagnosis Date    Aspergillus     Asthma     Atrial fibrillation     chronic    Atrial flutter     Bronchiectasis     C. difficile diarrhea 03/11/2017    CAD (coronary artery disease)     nonobstructive    Chronic diastolic CHF (congestive heart failure)     Clinical diagnosis of COVID-19 01/04/2022    Colitis     Cough     Cryoglobulinemia     Dyspnea on exertion     Exposure to hepatitis A 04/20/2018    Fall     Hyperlipidemia     Hypertension     Hyponatremia     Hypoxia     Infectious viral hepatitis     AGE 13    Left shoulder pain     Leg swelling     Lesion of lung     Malignant hyperthermia due to anesthesia     Mild tricuspid regurgitation     MR (mitral regurgitation)     mild    MVP (mitral valve prolapse)     Permanent atrial fibrillation     Pneumonia of both lungs due to infectious organism 05/31/2017    Followed BY ID for MAC      Pneumonia with the fungal infection aspergillosis 01/06/2017    Pneumothorax     SOB (shortness of breath)     Syncope 07/2024    UTI (urinary tract infection)     Wheeze     mild     Past Surgical History:   Procedure Laterality Date    BRONCHOSCOPY N/A 11/12/2016    Procedure: BRONCHOSCOPY WITH FLUORO, BRUSHINGS, BAL, AND BIOPSIES;  Surgeon: Rogelio Tucker MD;  Location: [facility] ENDOSCOPY;  Service:     BRONCHOSCOPY Bilateral 06/03/2017    Procedure: BRONCHOSCOPY with BAL ;  Surgeon:  Sung King MD;  Location: [facility] ENDOSCOPY;  Service:     BRONCHOSCOPY N/A 12/17/2019    Procedure: BRONCHOSCOPY WITH WASHINGS;  Surgeon: Rogelio Tucker MD;  Location: [facility] ENDOSCOPY;  Service: Pulmonary    BRONCHOSCOPY N/A 07/15/2022    Procedure: BRONCHOSCOPY;  Surgeon: Rogelio Tucker MD;  Location: [facility] ENDOSCOPY;  Service: Pulmonary;  Laterality: N/A;  Pre: Pneumonia  Post: Pneumonia    BRONCHOSCOPY N/A 10/2/2023    Procedure: BRONCHOSCOPY WITH BRONCHIAL AVEOLAR LAVAGE;  Surgeon: Rogelio Tucker MD;  Location: [facility] ENDOSCOPY;  Service: Pulmonary;  Laterality: N/A;  PRE:BRONCHIECTASIS /   POST: SAME    CATARACT EXTRACTION EXTRACAPSULAR W/ INTRAOCULAR LENS IMPLANTATION      COLONOSCOPY      2013    D & C WITH SUCTION      HYSTERECTOMY      KNEE ARTHROSCOPY Left     Partial     Family History   Problem Relation Age of Onset    Hypertension Mother     Stroke Mother     Heart disease Father     Hypertension Father     Cancer Brother     Cancer Son      Social History     Tobacco Use    Smoking status: Never    Smokeless tobacco: Never    Tobacco comments:     caffiene daily   Vaping Use    Vaping status: Never Used   Substance Use Topics    Alcohol use: Not Currently     Alcohol/week: 2.0 standard drinks of alcohol     Types: 1 Glasses of wine, 1 Shots of liquor per week    Drug use: No     No current facility-administered medications on file prior to encounter.     Current Outpatient Medications on File Prior to Encounter   Medication Sig Dispense Refill    acetaminophen (TYLENOL) 325 MG tablet Take 2 tablets by mouth Every 4 (Four) Hours As Needed for Moderate Pain. Indications: Pain      albuterol sulfate  (90 Base) MCG/ACT inhaler Inhale 2 puffs Every 6 (Six) Hours As Needed for Wheezing or Shortness of Air. 8 g 11    azithromycin (ZITHROMAX) 250 MG tablet Take 1 tablet by mouth Daily.      benzonatate (TESSALON) 200 MG capsule Take 1 capsule by mouth 3 (Three) Times a Day As Needed for Cough.  Indications: Cough      bumetanide (BUMEX) 2 MG tablet TAKE 1 TABLET TWICE A DAY FOR CARDIAC FAILURE, EDEMA (Patient taking differently: Take 1 tablet by mouth Daily.) 180 tablet 3    carvedilol (COREG) 3.125 MG tablet Take 1 tablet by mouth 2 (Two) Times a Day With Meals. Indications: Cardiac Failure, High Blood Pressure Disorder 180 tablet 3    digoxin (LANOXIN) 125 MCG tablet Take 1 tablet by mouth Every Other Day. Indications: Atrial Fibrillation, Cardiac Failure (Patient taking differently: Take 1 tablet by mouth Every Other Day. Last dose 11/17  Indications: Atrial Fibrillation, Cardiac Failure)      Eliquis 2.5 MG tablet tablet TAKE 1 TABLET EVERY 12 HOURS, FULL ANTICOAGULATION 180 tablet 3    FeroSul 325 (65 Fe) MG tablet TAKE ONE TABLET BY MOUTH DAILY WITH BREAKFAST (Patient taking differently: No sig reported) 90 tablet 0    fexofenadine (ALLEGRA) 180 MG tablet Take 1 tablet by mouth Daily. Indications: Hayfever      fluticasone-salmeterol (ADVAIR HFA) 115-21 MCG/ACT inhaler Inhale 2 puffs 2 (Two) Times a Day. Indications: Asthma      guaiFENesin (MUCINEX) 600 MG 12 hr tablet Take 1 tablet by mouth 2 (Two) Times a Day. Indications: Cough      ipratropium-albuterol (DUO-NEB) 0.5-2.5 mg/3 ml nebulizer Take 3 mL by nebulization 2 (Two) Times a Day. Indications: Asthma      Lactobacillus (FLORAJEN ACIDOPHILUS) capsule Take 1 tablet by mouth Daily. Indications: Heart failure, status change, with med/diet change      Multiple Vitamins-Minerals (PRESERVISION AREDS PO) Take 1 tablet by mouth 2 (Two) Times a Day. Indications: Vitamin and/or Mineral Deficiency      O2 (OXYGEN) Inhale 2 L/min Every Night. Indications: Oxygen Therapy      potassium chloride (KLOR-CON M20) 20 MEQ CR tablet Take 1 tablet by mouth Daily. Indications: Low Amount of Potassium in the Blood 90 tablet 3    predniSONE (DELTASONE) 10 MG tablet Take 1 tablet by mouth Daily.      sertraline (ZOLOFT) 25 MG tablet Take 1 tablet by mouth Daily.       sodium chloride 7 % nebulizer solution nebulizer solution Take 4 mL by nebulization 2 (Two) Times a Day. 240 mL 0     Allergies   Allergen Reactions    Amlodipine Besylate Swelling    Aspirin GI Intolerance     Caused bleeding ulcers    Bactrim [Sulfamethoxazole-Trimethoprim] Nausea And Vomiting    Erythromycin Unknown (See Comments)     Patient does not recall type of reaction.    Levaquin [Levofloxacin] Unknown (See Comments)     Nausea      Nitrofurantoin Nausea Only and Nausea And Vomiting    Ramipril Other (See Comments)     Cough       Objective    Objective     Vital Signs  Temp:  [97.3 °F (36.3 °C)-98.2 °F (36.8 °C)] 97.3 °F (36.3 °C)  Heart Rate:  [68-94] 88  Resp:  [14-24] 24  BP: (121-147)/(70-94) 134/94  SpO2:  [88 %-100 %] 100 %  on  Flow (L/min) (Oxygen Therapy):  [2] 2;   Device (Oxygen Therapy): room air  Body mass index is 22.03 kg/m².    Physical Exam  Vitals reviewed.   Constitutional:       General: She is not in acute distress.  HENT:      Head: Normocephalic and atraumatic.      Mouth/Throat:      Pharynx: No posterior oropharyngeal erythema.   Eyes:      General: No scleral icterus.  Neck:      Vascular: No JVD.   Cardiovascular:      Rate and Rhythm: Normal rate. Rhythm irregular.      Heart sounds: No murmur heard.  Pulmonary:      Effort: Pulmonary effort is normal. No respiratory distress.      Breath sounds: Normal breath sounds.   Abdominal:      General: Bowel sounds are normal. There is no distension.      Palpations: Abdomen is soft.      Tenderness: There is abdominal tenderness (Mild epigastric). There is no guarding or rebound.   Musculoskeletal:         General: No swelling or tenderness.      Cervical back: Neck supple.   Skin:     General: Skin is warm and dry.      Coloration: Skin is not jaundiced.      Findings: No rash.   Neurological:      Mental Status: She is alert and oriented to person, place, and time.   Psychiatric:         Mood and Affect: Mood normal.          Behavior: Behavior normal.         Results Review:  I reviewed the patient's new clinical results.  I reviewed the patient's new imaging results and agree with the interpretation.  I reviewed the patient's other test results and agree with the interpretation  I personally viewed and interpreted the patient's EKG/Telemetry data      Lab Results (last 24 hours)       Procedure Component Value Units Date/Time    Comprehensive Metabolic Panel [[id number]]  (Abnormal) Collected: 11/18/24 1253    Specimen: Blood Updated: 11/18/24 1321     Glucose 130 mg/dL      BUN 11 mg/dL      Creatinine 0.85 mg/dL      Sodium 137 mmol/L      Potassium 4.1 mmol/L      Chloride 100 mmol/L      CO2 28.4 mmol/L      Calcium 9.2 mg/dL      Total Protein 6.4 g/dL      Albumin 3.7 g/dL      ALT (SGPT) 19 U/L      AST (SGOT) 15 U/L      Alkaline Phosphatase 99 U/L      Total Bilirubin 0.5 mg/dL      Globulin 2.7 gm/dL      A/G Ratio 1.4 g/dL      BUN/Creatinine Ratio 12.9     Anion Gap 8.6 mmol/L      eGFR 63.6 mL/min/1.73     Narrative:      GFR Normal >60  Chronic Kidney Disease <60  Kidney Failure <15    The GFR formula is only valid for adults with stable renal function between ages 18 and 70.    Lipase [[id number]]  (Normal) Collected: 11/18/24 1253    Specimen: Blood Updated: 11/18/24 1321     Lipase 46 U/L     Lactic Acid, Plasma [[id number]]  (Normal) Collected: 11/18/24 1253    Specimen: Blood Updated: 11/18/24 1316     Lactate 1.8 mmol/L     High Sensitivity Troponin T [[id number]]  (Abnormal) Collected: 11/18/24 1253    Specimen: Blood Updated: 11/18/24 1321     HS Troponin T 35 ng/L     Narrative:      High Sensitive Troponin T Reference Range:  <14.0 ng/L- Negative Female for AMI  <22.0 ng/L- Negative Male for AMI  >=14 - Abnormal Female indicating possible myocardial injury.  >=22 - Abnormal Male indicating possible myocardial injury.   Clinicians would have to utilize clinical acumen, EKG, Troponin, and serial changes to determine  if it is an Acute Myocardial Infarction or myocardial injury due to an underlying chronic condition.         CBC & Differential [[id number]]  (Abnormal) Collected: 11/18/24 1338    Specimen: Blood Updated: 11/18/24 1429    Narrative:      The following orders were created for panel order CBC & Differential.  Procedure                               Abnormality         Status                     ---------                               -----------         ------                     CBC Auto Differential[[id number]]        Abnormal            Final result                 Please view results for these tests on the individual orders.    CBC Auto Differential [[id number]]  (Abnormal) Collected: 11/18/24 1338    Specimen: Blood Updated: 11/18/24 1429     WBC 11.59 10*3/mm3      RBC 3.13 10*6/mm3      Hemoglobin 10.1 g/dL      Hematocrit 33.3 %      .4 fL      MCH 32.3 pg      MCHC 30.3 g/dL      RDW 15.2 %      RDW-SD 60.0 fl      MPV 10.8 fL      Platelets 293 10*3/mm3      Neutrophil % 81.7 %      Lymphocyte % 9.9 %      Monocyte % 6.0 %      Eosinophil % 0.5 %      Basophil % 0.3 %      Immature Grans % 1.6 %      Neutrophils, Absolute 9.48 10*3/mm3      Lymphocytes, Absolute 1.15 10*3/mm3      Monocytes, Absolute 0.69 10*3/mm3      Eosinophils, Absolute 0.06 10*3/mm3      Basophils, Absolute 0.03 10*3/mm3      Immature Grans, Absolute 0.18 10*3/mm3     High Sensitivity Troponin T 2Hr [[id number]]  (Abnormal) Collected: 11/18/24 1534    Specimen: Blood Updated: 11/18/24 1609     HS Troponin T 28 ng/L      Troponin T Delta -7 ng/L     Narrative:      High Sensitive Troponin T Reference Range:  <14.0 ng/L- Negative Female for AMI  <22.0 ng/L- Negative Male for AMI  >=14 - Abnormal Female indicating possible myocardial injury.  >=22 - Abnormal Male indicating possible myocardial injury.   Clinicians would have to utilize clinical acumen, EKG, Troponin, and serial changes to determine if it is an Acute Myocardial  Infarction or myocardial injury due to an underlying chronic condition.         Respiratory Panel PCR w/COVID-19(SARS-CoV-2) ANAI/JORGE/THOM/PAD/COR/NIKHIL In-House, NP Swab in UTM/VTM, 2 HR TAT - Swab, Nasopharynx [[id number]]  (Abnormal) Collected: 11/18/24 2227    Specimen: Swab from Nasopharynx Updated: 11/19/24 0005     ADENOVIRUS, PCR Not Detected     Coronavirus 229E Not Detected     Coronavirus HKU1 Not Detected     Coronavirus NL63 Not Detected     Coronavirus OC43 Not Detected     COVID19 Not Detected     Human Metapneumovirus Not Detected     Human Rhinovirus/Enterovirus Detected     Influenza A PCR Not Detected     Influenza B PCR Not Detected     Parainfluenza Virus 1 Not Detected     Parainfluenza Virus 2 Not Detected     Parainfluenza Virus 3 Not Detected     Parainfluenza Virus 4 Not Detected     RSV, PCR Not Detected     Bordetella pertussis pcr Not Detected     Bordetella parapertussis PCR Not Detected     Chlamydophila pneumoniae PCR Not Detected     Mycoplasma pneumo by PCR Not Detected    Narrative:      In the setting of a positive respiratory panel with a viral infection PLUS a negative procalcitonin without other underlying concern for bacterial infection, consider observing off antibiotics or discontinuation of antibiotics and continue supportive care. If the respiratory panel is positive for atypical bacterial infection (Bordetella pertussis, Chlamydophila pneumoniae, or Mycoplasma pneumoniae), consider antibiotic de-escalation to target atypical bacterial infection.    CBC (No Diff) [[id number]]  (Abnormal) Collected: 11/19/24 0613    Specimen: Blood Updated: 11/19/24 0637     WBC 12.63 10*3/mm3      RBC 3.11 10*6/mm3      Hemoglobin 10.0 g/dL      Hematocrit 30.5 %      MCV 98.1 fL      MCH 32.2 pg      MCHC 32.8 g/dL      RDW 14.0 %      RDW-SD 49.5 fl      MPV 10.7 fL      Platelets 243 10*3/mm3     Comprehensive Metabolic Panel [[id number]] Collected: 11/19/24 0613    Specimen: Blood Updated:  11/19/24 0701     Glucose 99 mg/dL      BUN 13 mg/dL      Creatinine 0.86 mg/dL      Sodium 141 mmol/L      Potassium 3.9 mmol/L      Comment: Slight hemolysis detected by analyzer. Result may be falsely elevated.        Chloride 103 mmol/L      CO2 26.3 mmol/L      Calcium 9.1 mg/dL      Total Protein 6.4 g/dL      Albumin 3.5 g/dL      ALT (SGPT) 19 U/L      AST (SGOT) 22 U/L      Comment: Slight hemolysis detected by analyzer. Result may be falsely elevated.        Alkaline Phosphatase 96 U/L      Total Bilirubin 0.4 mg/dL      Globulin 2.9 gm/dL      A/G Ratio 1.2 g/dL      BUN/Creatinine Ratio 15.1     Anion Gap 11.7 mmol/L      eGFR 62.7 mL/min/1.73     Narrative:      GFR Normal >60  Chronic Kidney Disease <60  Kidney Failure <15    The GFR formula is only valid for adults with stable renal function between ages 18 and 70.    High Sensitivity Troponin T [[id number]]  (Abnormal) Collected: 11/19/24 0613    Specimen: Blood Updated: 11/19/24 0700     HS Troponin T 32 ng/L     Narrative:      High Sensitive Troponin T Reference Range:  <14.0 ng/L- Negative Female for AMI  <22.0 ng/L- Negative Male for AMI  >=14 - Abnormal Female indicating possible myocardial injury.  >=22 - Abnormal Male indicating possible myocardial injury.   Clinicians would have to utilize clinical acumen, EKG, Troponin, and serial changes to determine if it is an Acute Myocardial Infarction or myocardial injury due to an underlying chronic condition.         Digoxin Level [[id number]]  (Normal) Collected: 11/19/24 0613    Specimen: Blood Updated: 11/19/24 1028     Digoxin 1.00 ng/mL             Imaging Results (Last 24 Hours)       Procedure Component Value Units Date/Time    CT Abdomen Pelvis With Contrast [[id number]] Collected: 11/18/24 1328     Updated: 11/18/24 1400    Narrative:      CT ANGIOGRAM CHEST-, CT ABDOMEN PELVIS W CONTRAST-     INDICATION: Chest pain, evaluate aorta. Abdominal pain.     COMPARISON: CT chest December 28, 2023  and CT abdomen pelvis January 4, 2019     TECHNIQUE:  CTA chest with IV contrast. CT abdomen pelvis with contrast. Coronal and  sagittal reformats. Three dimensional reconstructions. Radiation dose  reduction techniques were utilized, including automated exposure control  and exposure modulation based on body size.     FINDINGS:      Chest wall: No lymphadenopathy.     Mediastinum: Coronary artery atherosclerotic calcifications. Severe  cardiomegaly. No pericardial effusion. No thoracic aortic aneurysm or  dissection seen. Calcified mediastinal and left hilar lymph nodes,  consistent with prior granulomatous infection. Mild mediastinal  adenopathy. For example, paratracheal/4R lymph node, series 4, axial  mage 50, measures 1 cm. For example, left paratracheal lymph node,  series 4, axial mage 52, measures 1 cm. Right paraesophageal/infrahilar  lymph node, series 4, axial mage 75, measures 1.5 cm. No hilar  lymphadenopathy.     Lungs/pleura: Trace right pleural fluid. Airways wall thickening.  Biapical pleural-parenchymal thickening. Multifocal pleural parenchymal  scarring. Partial atelectasis or scarring in the right middle lobe and  lingula. Bilateral centrilobular and tree-in-bud nodules. Scattered  subsegmental airway impactions. Calcified pulmonary nodule in the  superior segment left lower lobe, consistent with prior granulomatous  infection. Inferior right upper lobe opacity, with volume loss, suspect  subsegmental atelectasis or scarring.     ABDOMEN: Multiple fluid attenuating hepatic cysts. Possible hepatic  steatosis. Calcifications in the liver and spleen, consistent with prior  granulomatous infection. Gallbladder full of gallstones. No gallbladder  wall thickening or surrounding inflammation. Gallbladder is partially  contracted. No biliary ductal dilatation. Spleen is normal in size.  Incidental splenule. No pancreatic mass or pancreatic ductal dilatation  seen. No adrenal nodules. Large fluid  attenuating cyst in the left mid  kidney measures 6.8 cm. No solid-appearing renal mass or hydronephrosis.     Pelvis: Underdistended bladder. No bladder calculus. Hysterectomy. Left  ovarian cyst, series 2, axial mage 97, measures 2.3 cm, stable, likely  benign.     Bowel: No obstruction. Colonic diverticulosis. Appendix not identified  though no secondary findings of appendicitis.     Abdominal wall: Rectus diastases. Small fat-containing umbilical hernia.  Pelvic wall scarring. Small fat-containing left inguinal hernia.     Retroperitoneum: No lymphadenopathy.     Vasculature: Patent. Ectasia of the infrarenal abdominal aorta measuring  2.1 cm. Severe aortoiliac atherosclerotic calcification.     Osseous structures: Old left distal clavicular fracture. Old biconcave  compression deformities seen at T11 and T12, unchanged. Suspect old  superior plate compression deformity at T7 with 40% collapse is new in  the interval. Old left femoral greater trochanter fracture. Old left  inferior pubic ramus fracture. Bilateral hip osteoarthritis. Lumbar  facet degenerative arthropathy with grade 1 anterolisthesis of L3 on L4.          Impression:         1. No thoracic aortic aneurysm or dissection.  2. Severe cardiomegaly.  3. Airways disease with multifocal subsegmental airway occlusions and  centrilobular and tree-in-bud nodules, similar to prior. Finding may be  related to infectious bronchiolitis such as could be seen with MAC type  infection or related to aspiration.  4. No acute findings identified in the abdomen and pelvis.  5. Gallbladder full of gallstones without gallbladder wall thickening or  surrounding inflammation.  6. Colonic diverticulosis     This report was finalized on 11/18/2024 1:57 PM by Dr. Tres Lopez M.D on Workstation: [id number]       CT Angiogram Chest [[id number]] Collected: 11/18/24 1328     Updated: 11/18/24 1400    Narrative:      CT ANGIOGRAM CHEST-, CT ABDOMEN PELVIS W CONTRAST-      INDICATION: Chest pain, evaluate aorta. Abdominal pain.     COMPARISON: CT chest December 28, 2023 and CT abdomen pelvis January 4, 2019     TECHNIQUE:  CTA chest with IV contrast. CT abdomen pelvis with contrast. Coronal and  sagittal reformats. Three dimensional reconstructions. Radiation dose  reduction techniques were utilized, including automated exposure control  and exposure modulation based on body size.     FINDINGS:      Chest wall: No lymphadenopathy.     Mediastinum: Coronary artery atherosclerotic calcifications. Severe  cardiomegaly. No pericardial effusion. No thoracic aortic aneurysm or  dissection seen. Calcified mediastinal and left hilar lymph nodes,  consistent with prior granulomatous infection. Mild mediastinal  adenopathy. For example, paratracheal/4R lymph node, series 4, axial  mage 50, measures 1 cm. For example, left paratracheal lymph node,  series 4, axial mage 52, measures 1 cm. Right paraesophageal/infrahilar  lymph node, series 4, axial mage 75, measures 1.5 cm. No hilar  lymphadenopathy.     Lungs/pleura: Trace right pleural fluid. Airways wall thickening.  Biapical pleural-parenchymal thickening. Multifocal pleural parenchymal  scarring. Partial atelectasis or scarring in the right middle lobe and  lingula. Bilateral centrilobular and tree-in-bud nodules. Scattered  subsegmental airway impactions. Calcified pulmonary nodule in the  superior segment left lower lobe, consistent with prior granulomatous  infection. Inferior right upper lobe opacity, with volume loss, suspect  subsegmental atelectasis or scarring.     ABDOMEN: Multiple fluid attenuating hepatic cysts. Possible hepatic  steatosis. Calcifications in the liver and spleen, consistent with prior  granulomatous infection. Gallbladder full of gallstones. No gallbladder  wall thickening or surrounding inflammation. Gallbladder is partially  contracted. No biliary ductal dilatation. Spleen is normal in size.  Incidental  splenule. No pancreatic mass or pancreatic ductal dilatation  seen. No adrenal nodules. Large fluid attenuating cyst in the left mid  kidney measures 6.8 cm. No solid-appearing renal mass or hydronephrosis.     Pelvis: Underdistended bladder. No bladder calculus. Hysterectomy. Left  ovarian cyst, series 2, axial mage 97, measures 2.3 cm, stable, likely  benign.     Bowel: No obstruction. Colonic diverticulosis. Appendix not identified  though no secondary findings of appendicitis.     Abdominal wall: Rectus diastases. Small fat-containing umbilical hernia.  Pelvic wall scarring. Small fat-containing left inguinal hernia.     Retroperitoneum: No lymphadenopathy.     Vasculature: Patent. Ectasia of the infrarenal abdominal aorta measuring  2.1 cm. Severe aortoiliac atherosclerotic calcification.     Osseous structures: Old left distal clavicular fracture. Old biconcave  compression deformities seen at T11 and T12, unchanged. Suspect old  superior plate compression deformity at T7 with 40% collapse is new in  the interval. Old left femoral greater trochanter fracture. Old left  inferior pubic ramus fracture. Bilateral hip osteoarthritis. Lumbar  facet degenerative arthropathy with grade 1 anterolisthesis of L3 on L4.          Impression:         1. No thoracic aortic aneurysm or dissection.  2. Severe cardiomegaly.  3. Airways disease with multifocal subsegmental airway occlusions and  centrilobular and tree-in-bud nodules, similar to prior. Finding may be  related to infectious bronchiolitis such as could be seen with MAC type  infection or related to aspiration.  4. No acute findings identified in the abdomen and pelvis.  5. Gallbladder full of gallstones without gallbladder wall thickening or  surrounding inflammation.  6. Colonic diverticulosis     This report was finalized on 11/18/2024 1:57 PM by Dr. Tres Lopez M.D on Workstation: [id number]               Results for orders placed during the hospital  encounter of 10/04/23    Adult Transthoracic Echo Complete W/ Cont if Necessary Per Protocol    Interpretation Summary    Left ventricular systolic function is normal. Calculated left ventricular EF = 56%    Normal right ventricular cavity size and systolic function noted.    The left atrial cavity is severely dilated.    The right atrial cavity is severely dilated.    Mild to moderate mitral valve regurgitation is present.    Moderate tricuspid valve regurgitation is present.    Calculated right ventricular systolic pressure from tricuspid regurgitation is 49 mmHg.    There is a small (<1cm) circumferential pericardial effusion. There is no evidence of cardiac tamponade.      ECG 12 Lead Chest Pain   Final Result   HEART RATE=77  bpm   RR Zryvnsbi=793  ms   WA Interval=  ms   P Horizontal Axis=  deg   P Front Axis=  deg   QRSD Interval=71  ms   QT Tmfsgmtb=014  ms   AZnH=713  ms   QRS Axis=-2  deg   T Wave Axis=207  deg   - ABNORMAL ECG -   Atrial fibrillation   Repol abnrm, severe global ischemia (LM/MVD)   No change from previous tracing   Electronically Signed By: Anayeli Christopher ([facility]) 2024-11-18 19:08:19   Date and Time of Study:2024-11-18 12:41:11           Assessment/Plan     Active Hospital Problems    Diagnosis  POA    Rhinovirus infection [B34.8]  Yes    Calculus of gallbladder without cholecystitis without obstruction [K80.20]  Yes    Leukocytosis [D72.829]  Yes    Anemia [D64.9]  Yes    COPD (chronic obstructive pulmonary disease) [J44.9]  Yes    Permanent atrial fibrillation [I48.21]  Yes    Bronchiectasis [J47.9]  Yes    Iron deficiency anemia [D50.9]  Yes    DNR (do not resuscitate) [Z66]  Yes    Chronic respiratory failure with hypoxia [J96.11]  Yes    Primary hypertension [I10]  Yes      Resolved Hospital Problems    Diagnosis Date Resolved POA    **Abdominal pain [R10.9] 11/19/2024 Yes       Ms. Rizzo is a 89 y.o. with history of bronchiectasis and A-fib who presented with pain in her subscapular  and epigastric area, findings of cholelithiasis without cholecystitis along with rhinovirus    Her pain has resolved.  I am not sure if this truly relates to her gallstones or not.  Regardless, after discussing options, patient is not interested in surgery unless it was emergent and that certainly is not the case right now.  She would consider it if it was infected but there is no evidence for that currently.  Her abdominal exam is relatively benign, only mildly tender and she is tolerating a diet well.  She can be discharged home at this time with outpatient follow-up.  We went over some of the signs of cholecystitis and went over some dietary measures.  Her other medical issues are stable and no changes will be made to her medications.    Daughter was present at bedside for the conversation.  She can follow-up with her PCP in about a week.    Karel Harvey MD  [facility]  11/19/24  12:40 EST

## 2024-11-19 NOTE — OUTREACH NOTE
Prep Survey      Flowsheet Row Responses   [facility] patient discharged from? [place]   Is LACE score < 7 ? No   Eligibility [facility]   Date of Admission 11/18/24   Date of Discharge 11/19/24   Discharge Disposition Home or Self Care   Discharge diagnosis Abdominal pain   Does the patient have one of the following disease processes/diagnoses(primary or secondary)? Other   Does the patient have Home health ordered? No   Is there a DME ordered? No   Prep survey completed? Yes            Cammy HUBBARD - Registered Nurse

## 2024-11-19 NOTE — CASE MANAGEMENT/SOCIAL WORK
"Continued Stay Note  [facility]     Patient Name: Agnieszka Rizzo  MRN: [id number]  Today's Date: 11/19/2024    Admit Date: 11/18/2024        Discharge Plan       Row Name 11/19/24 1348       Plan    Plan Comments Spoke with patient at bedside. She refuses assessment stating, \" The dr says Im going home. \". Gave patient code 44 document and explained it to her. Verbalizes understanding.. Obtained signed copy and gave to Sana in CCP department                         Expected Discharge Date and Time       Expected Discharge Date Expected Discharge Time    Nov 19, 2024               Qian Michael RN    "

## 2024-11-19 NOTE — CONSULTS
Pt sitting up in chair. Chp offered introductions to Pt and sat with Pt to provide hospitality and supportive presence. Chp provided spiritual care and prayer. Follow up welcomed.      Chp remains available to Pt as needed.

## 2024-11-19 NOTE — CASE MANAGEMENT/SOCIAL WORK
Case Management Discharge Note      Final Note: home         Selected Continued Care - Admitted Since 11/18/2024              Selected Continued Care - Episodes Includes continued care and service providers with selected services from the active episodes listed below      High Risk Care Management Episode start date: 10/31/2024 (Paused)   There are no active outsourced providers for this episode.                 Transportation Services  Private: Car    Final Discharge Disposition Code: 01 - home or self-care

## 2024-11-20 ENCOUNTER — TRANSITIONAL CARE MANAGEMENT TELEPHONE ENCOUNTER (OUTPATIENT)
Dept: CALL CENTER | Facility: HOSPITAL | Age: 89
End: 2024-11-20
Payer: MEDICARE

## 2024-11-20 NOTE — OUTREACH NOTE
Call Center TCM Note      Flowsheet Row Responses   [facility] patient discharged from? [place]   Does the patient have one of the following disease processes/diagnoses(primary or secondary)? Other   TCM attempt successful? Yes   Call start time 0909   Call end time 0912   Discharge diagnosis Abdominal pain   Person spoke with today (if not patient) and relationship daughter Michelle Davis reviewed with patient/caregiver? Yes   Does the patient have all medications ordered at discharge? N/A   Prescription comments No changes   Is the patient taking all medications as directed (includes completed medication regime)? Yes   Does the patient have an appointment with their PCP within 7-14 days of discharge? No appointments available   Nursing Interventions Routed TCM call to PCP office, Patient declined scheduling/rescheduling appointment at this time   Has home health visited the patient within 72 hours of discharge? N/A   Psychosocial issues? No   Did the patient receive a copy of their discharge instructions? Yes   Nursing interventions Reviewed instructions with patient   What is the patient's perception of their health status since discharge? Improving   Is the patient/caregiver able to teach back signs and symptoms related to disease process for when to call PCP? Yes   Is the patient/caregiver able to teach back signs and symptoms related to disease process for when to call 911? Yes   Is the patient/caregiver able to teach back the hierarchy of who to call/visit for symptoms/problems? PCP, Specialist, Home health nurse, Urgent Care, ED, 911 Yes   If the patient is a current smoker, are they able to teach back resources for cessation? Not a smoker   TCM call completed? Yes   Wrap up additional comments D/C DX: abdominal pain,  rhinovirus - Per dtr Michelle pt doing ok. Pt is now in ASST LVG. No questions at this time. TCM APPT declined by family for now.   Call end time 0912            Agnieszka Silva,  MA    11/20/2024, 09:14 EST

## 2024-12-05 ENCOUNTER — PATIENT OUTREACH (OUTPATIENT)
Dept: CASE MANAGEMENT | Facility: OTHER | Age: 89
End: 2024-12-05
Payer: MEDICARE

## 2024-12-05 NOTE — OUTREACH NOTE
AMBULATORY CASE MANAGEMENT NOTE    Names and Relationships of Patient/Support Persons: Contact: Agnieszka Rizzo; Relationship: Self -     Patient Outreach  RN-ACM outreach with patient. Reviewed with patient role of RN-ACM; CM case management services and patient verbalized understanding. Discussed recent hospitalization and discharge home to [facility]. Patient states to be doing well; states to have episodes of SOB with walking or talking; states no difficulty with fever; chest pain; appetite or sleeping. Patient states to be compliant with medications and use of O2. Patient states to be using walker and no difficulty with falls. Patient states to be receiving assistance from daughter as needed. Patient states to appreciate outreach and declines needs for further outreach. No further questions voiced at this time.         Ria MILLIGAN  Ambulatory Case Management    12/5/2024, 14:02 EST

## 2024-12-16 ENCOUNTER — APPOINTMENT (OUTPATIENT)
Dept: CT IMAGING | Facility: HOSPITAL | Age: 89
End: 2024-12-16
Payer: MEDICARE

## 2024-12-16 ENCOUNTER — HOSPITAL ENCOUNTER (INPATIENT)
Facility: HOSPITAL | Age: 89
LOS: 3 days | Discharge: HOME | End: 2024-12-21
Attending: STUDENT IN AN ORGANIZED HEALTH CARE EDUCATION/TRAINING PROGRAM | Admitting: INTERNAL MEDICINE
Payer: MEDICARE

## 2024-12-16 ENCOUNTER — APPOINTMENT (OUTPATIENT)
Dept: GENERAL RADIOLOGY | Facility: HOSPITAL | Age: 89
End: 2024-12-16
Payer: MEDICARE

## 2024-12-16 DIAGNOSIS — M43.9 COMPRESSION DEFORMITY OF VERTEBRA: ICD-10-CM

## 2024-12-16 DIAGNOSIS — K80.20 CALCULUS OF GALLBLADDER WITHOUT CHOLECYSTITIS WITHOUT OBSTRUCTION: ICD-10-CM

## 2024-12-16 DIAGNOSIS — R10.9 ACUTE ABDOMINAL PAIN: Primary | ICD-10-CM

## 2024-12-16 DIAGNOSIS — J96.21 ACUTE ON CHRONIC RESPIRATORY FAILURE WITH HYPOXIA: ICD-10-CM

## 2024-12-16 DIAGNOSIS — M80.88XD OSTEOPOROTIC COMPRESSION FRACTURE OF VERTEBRA WITH ROUTINE HEALING, SUBSEQUENT ENCOUNTER: ICD-10-CM

## 2024-12-16 DIAGNOSIS — R93.5 ABNORMAL CT OF THE ABDOMEN: ICD-10-CM

## 2024-12-16 LAB
ALBUMIN SERPL-MCNC: 3.7 G/DL (ref 3.5–5.2)
ALBUMIN/GLOB SERPL: 1.3 G/DL
ALP SERPL-CCNC: 117 U/L (ref 39–117)
ALT SERPL W P-5'-P-CCNC: 22 U/L (ref 1–33)
ANION GAP SERPL CALCULATED.3IONS-SCNC: 8 MMOL/L (ref 5–15)
AST SERPL-CCNC: 27 U/L (ref 1–32)
B PARAPERT DNA SPEC QL NAA+PROBE: NOT DETECTED
B PERT DNA SPEC QL NAA+PROBE: NOT DETECTED
BASOPHILS # BLD AUTO: 0.06 10*3/MM3 (ref 0–0.2)
BASOPHILS NFR BLD AUTO: 0.5 % (ref 0–1.5)
BILIRUB SERPL-MCNC: 0.4 MG/DL (ref 0–1.2)
BUN SERPL-MCNC: 10 MG/DL (ref 8–23)
BUN/CREAT SERPL: 10.6 (ref 7–25)
C PNEUM DNA NPH QL NAA+NON-PROBE: NOT DETECTED
CALCIUM SPEC-SCNC: 9.5 MG/DL (ref 8.2–9.6)
CHLORIDE SERPL-SCNC: 105 MMOL/L (ref 98–107)
CO2 SERPL-SCNC: 28 MMOL/L (ref 22–29)
CREAT SERPL-MCNC: 0.94 MG/DL (ref 0.57–1)
D-LACTATE SERPL-SCNC: 1.4 MMOL/L (ref 0.5–2)
DEPRECATED RDW RBC AUTO: 50.8 FL (ref 37–54)
EGFRCR SERPLBLD CKD-EPI 2021: 56.3 ML/MIN/1.73
EOSINOPHIL # BLD AUTO: 0.16 10*3/MM3 (ref 0–0.4)
EOSINOPHIL NFR BLD AUTO: 1.4 % (ref 0.3–6.2)
ERYTHROCYTE [DISTWIDTH] IN BLOOD BY AUTOMATED COUNT: 13.8 % (ref 12.3–15.4)
FLUAV SUBTYP SPEC NAA+PROBE: NOT DETECTED
FLUBV RNA ISLT QL NAA+PROBE: NOT DETECTED
GEN 5 1HR TROPONIN T REFLEX: 31 NG/L
GLOBULIN UR ELPH-MCNC: 2.9 GM/DL
GLUCOSE SERPL-MCNC: 145 MG/DL (ref 65–99)
HADV DNA SPEC NAA+PROBE: NOT DETECTED
HCOV 229E RNA SPEC QL NAA+PROBE: NOT DETECTED
HCOV HKU1 RNA SPEC QL NAA+PROBE: NOT DETECTED
HCOV NL63 RNA SPEC QL NAA+PROBE: NOT DETECTED
HCOV OC43 RNA SPEC QL NAA+PROBE: NOT DETECTED
HCT VFR BLD AUTO: 34 % (ref 34–46.6)
HGB BLD-MCNC: 11.1 G/DL (ref 12–15.9)
HMPV RNA NPH QL NAA+NON-PROBE: NOT DETECTED
HOLD SPECIMEN: NORMAL
HOLD SPECIMEN: NORMAL
HPIV1 RNA ISLT QL NAA+PROBE: NOT DETECTED
HPIV2 RNA SPEC QL NAA+PROBE: NOT DETECTED
HPIV3 RNA NPH QL NAA+PROBE: NOT DETECTED
HPIV4 P GENE NPH QL NAA+PROBE: NOT DETECTED
IMM GRANULOCYTES # BLD AUTO: 0.17 10*3/MM3 (ref 0–0.05)
IMM GRANULOCYTES NFR BLD AUTO: 1.5 % (ref 0–0.5)
LIPASE SERPL-CCNC: 28 U/L (ref 13–60)
LYMPHOCYTES # BLD AUTO: 1.06 10*3/MM3 (ref 0.7–3.1)
LYMPHOCYTES NFR BLD AUTO: 9.2 % (ref 19.6–45.3)
M PNEUMO IGG SER IA-ACNC: NOT DETECTED
MCH RBC QN AUTO: 33 PG (ref 26.6–33)
MCHC RBC AUTO-ENTMCNC: 32.6 G/DL (ref 31.5–35.7)
MCV RBC AUTO: 101.2 FL (ref 79–97)
MONOCYTES # BLD AUTO: 0.81 10*3/MM3 (ref 0.1–0.9)
MONOCYTES NFR BLD AUTO: 7.1 % (ref 5–12)
NEUTROPHILS NFR BLD AUTO: 80.3 % (ref 42.7–76)
NEUTROPHILS NFR BLD AUTO: 9.21 10*3/MM3 (ref 1.7–7)
NRBC BLD AUTO-RTO: 1.3 /100 WBC (ref 0–0.2)
NT-PROBNP SERPL-MCNC: 5193 PG/ML (ref 0–1800)
PLATELET # BLD AUTO: 297 10*3/MM3 (ref 140–450)
PMV BLD AUTO: 10.7 FL (ref 6–12)
POTASSIUM SERPL-SCNC: 4 MMOL/L (ref 3.5–5.2)
PROT SERPL-MCNC: 6.6 G/DL (ref 6–8.5)
QT INTERVAL: 314 MS
QTC INTERVAL: 376 MS
RBC # BLD AUTO: 3.36 10*6/MM3 (ref 3.77–5.28)
RHINOVIRUS RNA SPEC NAA+PROBE: NOT DETECTED
RSV RNA NPH QL NAA+NON-PROBE: NOT DETECTED
SARS-COV-2 RNA NPH QL NAA+NON-PROBE: NOT DETECTED
SODIUM SERPL-SCNC: 141 MMOL/L (ref 136–145)
TROPONIN T % DELTA: -16 %
TROPONIN T NUMERIC DELTA: -6 NG/L
TROPONIN T SERPL HS-MCNC: 37 NG/L
WBC NRBC COR # BLD AUTO: 11.47 10*3/MM3 (ref 3.4–10.8)
WHOLE BLOOD HOLD COAG: NORMAL
WHOLE BLOOD HOLD SPECIMEN: NORMAL

## 2024-12-16 PROCEDURE — 93005 ELECTROCARDIOGRAM TRACING: CPT | Performed by: NURSE PRACTITIONER

## 2024-12-16 PROCEDURE — 25010000002 MORPHINE PER 10 MG: Performed by: NURSE PRACTITIONER

## 2024-12-16 PROCEDURE — 94664 DEMO&/EVAL PT USE INHALER: CPT

## 2024-12-16 PROCEDURE — 71045 X-RAY EXAM CHEST 1 VIEW: CPT

## 2024-12-16 PROCEDURE — 83605 ASSAY OF LACTIC ACID: CPT | Performed by: INTERNAL MEDICINE

## 2024-12-16 PROCEDURE — G0378 HOSPITAL OBSERVATION PER HR: HCPCS

## 2024-12-16 PROCEDURE — 36415 COLL VENOUS BLD VENIPUNCTURE: CPT

## 2024-12-16 PROCEDURE — 85025 COMPLETE CBC W/AUTO DIFF WBC: CPT | Performed by: NURSE PRACTITIONER

## 2024-12-16 PROCEDURE — 83880 ASSAY OF NATRIURETIC PEPTIDE: CPT | Performed by: NURSE PRACTITIONER

## 2024-12-16 PROCEDURE — 25010000002 METHYLPREDNISOLONE PER 40 MG: Performed by: INTERNAL MEDICINE

## 2024-12-16 PROCEDURE — 74177 CT ABD & PELVIS W/CONTRAST: CPT

## 2024-12-16 PROCEDURE — 84484 ASSAY OF TROPONIN QUANT: CPT | Performed by: NURSE PRACTITIONER

## 2024-12-16 PROCEDURE — 25510000001 IOPAMIDOL PER 1 ML: Performed by: STUDENT IN AN ORGANIZED HEALTH CARE EDUCATION/TRAINING PROGRAM

## 2024-12-16 PROCEDURE — 83690 ASSAY OF LIPASE: CPT | Performed by: NURSE PRACTITIONER

## 2024-12-16 PROCEDURE — 0202U NFCT DS 22 TRGT SARS-COV-2: CPT | Performed by: NURSE PRACTITIONER

## 2024-12-16 PROCEDURE — 94799 UNLISTED PULMONARY SVC/PX: CPT

## 2024-12-16 PROCEDURE — 87040 BLOOD CULTURE FOR BACTERIA: CPT | Performed by: INTERNAL MEDICINE

## 2024-12-16 PROCEDURE — 99285 EMERGENCY DEPT VISIT HI MDM: CPT

## 2024-12-16 PROCEDURE — 80053 COMPREHEN METABOLIC PANEL: CPT | Performed by: NURSE PRACTITIONER

## 2024-12-16 PROCEDURE — 71260 CT THORAX DX C+: CPT

## 2024-12-16 PROCEDURE — 25010000002 CEFTRIAXONE PER 250 MG: Performed by: INTERNAL MEDICINE

## 2024-12-16 PROCEDURE — 93010 ELECTROCARDIOGRAM REPORT: CPT | Performed by: INTERNAL MEDICINE

## 2024-12-16 PROCEDURE — 25010000002 METHYLPREDNISOLONE PER 125 MG: Performed by: NURSE PRACTITIONER

## 2024-12-16 PROCEDURE — 94640 AIRWAY INHALATION TREATMENT: CPT

## 2024-12-16 PROCEDURE — 25010000002 ONDANSETRON PER 1 MG: Performed by: NURSE PRACTITIONER

## 2024-12-16 PROCEDURE — 94761 N-INVAS EAR/PLS OXIMETRY MLT: CPT

## 2024-12-16 RX ORDER — BISACODYL 10 MG
10 SUPPOSITORY, RECTAL RECTAL DAILY PRN
Status: DISCONTINUED | OUTPATIENT
Start: 2024-12-16 | End: 2024-12-21 | Stop reason: HOSPADM

## 2024-12-16 RX ORDER — IOPAMIDOL 755 MG/ML
100 INJECTION, SOLUTION INTRAVASCULAR
Status: COMPLETED | OUTPATIENT
Start: 2024-12-16 | End: 2024-12-16

## 2024-12-16 RX ORDER — ONDANSETRON 4 MG/1
4 TABLET, ORALLY DISINTEGRATING ORAL EVERY 6 HOURS PRN
Status: DISCONTINUED | OUTPATIENT
Start: 2024-12-16 | End: 2024-12-21 | Stop reason: HOSPADM

## 2024-12-16 RX ORDER — GUAIFENESIN 600 MG/1
600 TABLET, EXTENDED RELEASE ORAL 2 TIMES DAILY
Status: DISCONTINUED | OUTPATIENT
Start: 2024-12-16 | End: 2024-12-21 | Stop reason: HOSPADM

## 2024-12-16 RX ORDER — ACETAMINOPHEN 160 MG/5ML
650 SOLUTION ORAL EVERY 4 HOURS PRN
Status: DISCONTINUED | OUTPATIENT
Start: 2024-12-16 | End: 2024-12-21 | Stop reason: HOSPADM

## 2024-12-16 RX ORDER — AMOXICILLIN 250 MG
2 CAPSULE ORAL 2 TIMES DAILY PRN
Status: DISCONTINUED | OUTPATIENT
Start: 2024-12-16 | End: 2024-12-21 | Stop reason: HOSPADM

## 2024-12-16 RX ORDER — MORPHINE SULFATE 2 MG/ML
2 INJECTION, SOLUTION INTRAMUSCULAR; INTRAVENOUS ONCE
Status: COMPLETED | OUTPATIENT
Start: 2024-12-16 | End: 2024-12-16

## 2024-12-16 RX ORDER — BUDESONIDE AND FORMOTEROL FUMARATE DIHYDRATE 160; 4.5 UG/1; UG/1
1 AEROSOL RESPIRATORY (INHALATION)
Status: DISCONTINUED | OUTPATIENT
Start: 2024-12-16 | End: 2024-12-21 | Stop reason: HOSPADM

## 2024-12-16 RX ORDER — ONDANSETRON 2 MG/ML
4 INJECTION INTRAMUSCULAR; INTRAVENOUS EVERY 6 HOURS PRN
Status: DISCONTINUED | OUTPATIENT
Start: 2024-12-16 | End: 2024-12-21 | Stop reason: HOSPADM

## 2024-12-16 RX ORDER — ALBUTEROL SULFATE 0.83 MG/ML
2.5 SOLUTION RESPIRATORY (INHALATION) EVERY 6 HOURS PRN
Status: DISCONTINUED | OUTPATIENT
Start: 2024-12-16 | End: 2024-12-21 | Stop reason: HOSPADM

## 2024-12-16 RX ORDER — ONDANSETRON 2 MG/ML
4 INJECTION INTRAMUSCULAR; INTRAVENOUS ONCE
Status: COMPLETED | OUTPATIENT
Start: 2024-12-16 | End: 2024-12-16

## 2024-12-16 RX ORDER — SODIUM CHLORIDE 0.9 % (FLUSH) 0.9 %
10 SYRINGE (ML) INJECTION AS NEEDED
Status: DISCONTINUED | OUTPATIENT
Start: 2024-12-16 | End: 2024-12-21 | Stop reason: HOSPADM

## 2024-12-16 RX ORDER — MORPHINE SULFATE 2 MG/ML
2 INJECTION, SOLUTION INTRAMUSCULAR; INTRAVENOUS EVERY 4 HOURS PRN
Status: DISCONTINUED | OUTPATIENT
Start: 2024-12-16 | End: 2024-12-19

## 2024-12-16 RX ORDER — ACETAMINOPHEN 650 MG/1
650 SUPPOSITORY RECTAL EVERY 4 HOURS PRN
Status: DISCONTINUED | OUTPATIENT
Start: 2024-12-16 | End: 2024-12-21 | Stop reason: HOSPADM

## 2024-12-16 RX ORDER — CARVEDILOL 3.12 MG/1
3.12 TABLET ORAL 2 TIMES DAILY WITH MEALS
Status: DISCONTINUED | OUTPATIENT
Start: 2024-12-16 | End: 2024-12-21 | Stop reason: HOSPADM

## 2024-12-16 RX ORDER — ACETAMINOPHEN 325 MG/1
650 TABLET ORAL EVERY 4 HOURS PRN
Status: DISCONTINUED | OUTPATIENT
Start: 2024-12-16 | End: 2024-12-21 | Stop reason: HOSPADM

## 2024-12-16 RX ORDER — DOXYCYCLINE 100 MG/1
100 CAPSULE ORAL EVERY 12 HOURS SCHEDULED
Status: COMPLETED | OUTPATIENT
Start: 2024-12-16 | End: 2024-12-21

## 2024-12-16 RX ORDER — BUMETANIDE 2 MG/1
2 TABLET ORAL DAILY
Status: DISCONTINUED | OUTPATIENT
Start: 2024-12-17 | End: 2024-12-21 | Stop reason: HOSPADM

## 2024-12-16 RX ORDER — BISACODYL 5 MG/1
5 TABLET, DELAYED RELEASE ORAL DAILY PRN
Status: DISCONTINUED | OUTPATIENT
Start: 2024-12-16 | End: 2024-12-21 | Stop reason: HOSPADM

## 2024-12-16 RX ORDER — IPRATROPIUM BROMIDE AND ALBUTEROL SULFATE 2.5; .5 MG/3ML; MG/3ML
3 SOLUTION RESPIRATORY (INHALATION) ONCE
Status: COMPLETED | OUTPATIENT
Start: 2024-12-16 | End: 2024-12-16

## 2024-12-16 RX ORDER — IPRATROPIUM BROMIDE AND ALBUTEROL SULFATE 2.5; .5 MG/3ML; MG/3ML
3 SOLUTION RESPIRATORY (INHALATION)
Status: DISCONTINUED | OUTPATIENT
Start: 2024-12-16 | End: 2024-12-21 | Stop reason: HOSPADM

## 2024-12-16 RX ORDER — METHYLPREDNISOLONE SODIUM SUCCINATE 125 MG/2ML
125 INJECTION, POWDER, LYOPHILIZED, FOR SOLUTION INTRAMUSCULAR; INTRAVENOUS ONCE
Status: COMPLETED | OUTPATIENT
Start: 2024-12-16 | End: 2024-12-16

## 2024-12-16 RX ORDER — METHYLPREDNISOLONE SODIUM SUCCINATE 40 MG/ML
40 INJECTION, POWDER, LYOPHILIZED, FOR SOLUTION INTRAMUSCULAR; INTRAVENOUS EVERY 8 HOURS
Status: DISCONTINUED | OUTPATIENT
Start: 2024-12-17 | End: 2024-12-17

## 2024-12-16 RX ORDER — BENZONATATE 100 MG/1
200 CAPSULE ORAL 3 TIMES DAILY PRN
Status: DISCONTINUED | OUTPATIENT
Start: 2024-12-16 | End: 2024-12-21 | Stop reason: HOSPADM

## 2024-12-16 RX ORDER — MULTIPLE VITAMINS W/ MINERALS TAB 9MG-400MCG
1 TAB ORAL 2 TIMES DAILY
Status: DISCONTINUED | OUTPATIENT
Start: 2024-12-16 | End: 2024-12-21 | Stop reason: HOSPADM

## 2024-12-16 RX ORDER — SERTRALINE HYDROCHLORIDE 25 MG/1
25 TABLET, FILM COATED ORAL DAILY
Status: DISCONTINUED | OUTPATIENT
Start: 2024-12-17 | End: 2024-12-21 | Stop reason: HOSPADM

## 2024-12-16 RX ORDER — POLYETHYLENE GLYCOL 3350 17 G/17G
17 POWDER, FOR SOLUTION ORAL DAILY PRN
Status: DISCONTINUED | OUTPATIENT
Start: 2024-12-16 | End: 2024-12-21 | Stop reason: HOSPADM

## 2024-12-16 RX ADMIN — IPRATROPIUM BROMIDE AND ALBUTEROL SULFATE 3 ML: 2.5; .5 SOLUTION RESPIRATORY (INHALATION) at 19:10

## 2024-12-16 RX ADMIN — ONDANSETRON 4 MG: 2 INJECTION, SOLUTION INTRAMUSCULAR; INTRAVENOUS at 14:32

## 2024-12-16 RX ADMIN — MORPHINE SULFATE 2 MG: 2 INJECTION, SOLUTION INTRAMUSCULAR; INTRAVENOUS at 14:33

## 2024-12-16 RX ADMIN — CARVEDILOL 3.12 MG: 3.12 TABLET, FILM COATED ORAL at 21:48

## 2024-12-16 RX ADMIN — IPRATROPIUM BROMIDE AND ALBUTEROL SULFATE 3 ML: .5; 3 SOLUTION RESPIRATORY (INHALATION) at 16:11

## 2024-12-16 RX ADMIN — IOPAMIDOL 85 ML: 755 INJECTION, SOLUTION INTRAVENOUS at 14:11

## 2024-12-16 RX ADMIN — CEFTRIAXONE SODIUM 1000 MG: 1 INJECTION, POWDER, FOR SOLUTION INTRAMUSCULAR; INTRAVENOUS at 21:48

## 2024-12-16 RX ADMIN — Medication 1 TABLET: at 21:48

## 2024-12-16 RX ADMIN — DOXYCYCLINE 100 MG: 100 CAPSULE ORAL at 21:48

## 2024-12-16 RX ADMIN — GUAIFENESIN 600 MG: 600 TABLET, MULTILAYER, EXTENDED RELEASE ORAL at 21:48

## 2024-12-16 RX ADMIN — METHYLPREDNISOLONE SODIUM SUCCINATE 40 MG: 40 INJECTION, POWDER, FOR SOLUTION INTRAMUSCULAR; INTRAVENOUS at 21:48

## 2024-12-16 RX ADMIN — METHYLPREDNISOLONE SODIUM SUCCINATE 125 MG: 125 INJECTION INTRAMUSCULAR; INTRAVENOUS at 16:07

## 2024-12-16 NOTE — ED PROVIDER NOTES
EMERGENCY DEPARTMENT ENCOUNTER    Room Number:  08/08  Date seen:  12/16/2024  PCP: Matt Rose MD  Historian/Independent historian: EMS  Chronic or social conditions impacting care: dementia      HPI:  Chief Complaint: abdominal pain  A complete HPI/ROS/PMH/PSH/SH/FH are unobtainable due to: dementia   Context: Agnieszka Rizzo is a 89 y.o. female who presents to the ED c/o abdominal pain.  Patient brought in by EMS with reports of diffuse abdominal pain, they state that she was recently admitted for similar complaint however her symptoms improved and she was discharged back to her facility at that time.  Daughter arrives initial assessment and states that the patient had been moaning and groaning due to severe abdominal pain.  She is not aware of any fevers.  History is limited due to dementia.  Of note, patient was noted to be hypoxic on room air.  She typically only wears oxygen at night as needed.    External Medical record review:   11/18/2024: Hospital mission for generalized abdominal pain with gallstones  Ms. Rizzo is a 89 y.o. with history of bronchiectasis and A-fib who presented with pain in her subscapular and epigastric area, findings of cholelithiasis without cholecystitis along with rhinovirus.  See H&P for details.  Her pain resolved overnight and she is asking to go home now.  See the H&P for full discussion.     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    Permanent atrial fibrillation 06/09/2017    Anxiety 06/20/2017    Medicare annual wellness visit, subsequent 04/18/2019    Herpes infection 11/19/2019    Abnormal EKG 12/16/2019    Alteration in anticoagulation     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    COPD (chronic obstructive pulmonary disease)     Chronic diastolic CHF (congestive heart failure) 11/27/2023    Orthostasis 07/30/2024    Nonrheumatic tricuspid valve regurgitation 07/30/2024    Pulmonary hypertension 07/30/2024    Pericardial effusion 07/30/2024    Fracture of greater trochanter of left femur 08/14/2024    Leukocytosis 11/18/2024    Anemia 11/18/2024    Infectious disorder of bronchus 11/18/2024    Rhinovirus infection 11/19/2024    Calculus of gallbladder without cholecystitis without obstruction 11/19/2024     Resolved Ambulatory Problems     Diagnosis Date Noted    Pneumothorax of right lung after biopsy 11/12/2016    Pulmonary aspergillosis 11/16/2016    Heart failure, diastolic, with acute decompensation 12/01/2016    Persistent atrial fibrillation 12/01/2016    Pneumonia 01/01/2017    Pneumonia with the fungal infection aspergillosis 01/06/2017    Acid-fast bacteria present 01/09/2017    Hyponatremia 02/08/2017    C. difficile colitis 03/11/2017    Sepsis 03/12/2017    Acute on chronic diastolic heart failure 03/12/2017    CHF (congestive heart failure) 03/22/2017    Pancolitis 04/02/2017    Pneumonia of both lungs due to infectious organism 05/31/2017    Exposure to hepatitis A 04/20/2018    Abdominal pain 09/23/2019    Moderate asthma with acute exacerbation 12/02/2019    Bronchitis, acute, with bronchospasm 12/16/2019    Acute bronchitis due to parainfluenza virus 12/16/2019    COPD with acute exacerbation  12/16/2019    UTI (urinary tract infection) 12/19/2019    Fall     Laceration of left upper extremity 07/16/2021    Multiple skin tears 07/16/2021    Clinical diagnosis of COVID-19 01/04/2022    Pneumonia of  right lung due to infectious organism, unspecified part of lung 07/09/2022    Dizziness 09/09/2022    Bronchiectasis 02/20/2023    Sepsis due to pneumonia 04/03/2023    Acute pulmonary edema 08/04/2023    Electrolyte imbalance 08/16/2023    Acute on chronic diastolic congestive heart failure 10/04/2023    Acute on chronic diastolic heart failure 10/05/2023    CHF (congestive heart failure) 11/26/2023    Syncope and collapse 07/10/2024    Abdominal pain 11/18/2024     Past Medical History:   Diagnosis Date    Aspergillus     Asthma     Atrial fibrillation     Atrial flutter     C. difficile diarrhea 03/11/2017    CAD (coronary artery disease)     Colitis     Cough     Cryoglobulinemia     Dyspnea on exertion     Hyperlipidemia     Hypertension     Hypoxia     Infectious viral hepatitis     Left shoulder pain     Leg swelling     Lesion of lung     Malignant hyperthermia due to anesthesia     Mild tricuspid regurgitation     MR (mitral regurgitation)     MVP (mitral valve prolapse)     Pneumothorax     SOB (shortness of breath)     Syncope 07/2024    Wheeze          PAST SURGICAL HISTORY  Past Surgical History:   Procedure Laterality Date    BRONCHOSCOPY N/A 11/12/2016    Procedure: BRONCHOSCOPY WITH FLUORO, BRUSHINGS, BAL, AND BIOPSIES;  Surgeon: Rogelio Tucker MD;  Location: [facility] ENDOSCOPY;  Service:     BRONCHOSCOPY Bilateral 06/03/2017    Procedure: BRONCHOSCOPY with BAL ;  Surgeon: Sung King MD;  Location: [facility] ENDOSCOPY;  Service:     BRONCHOSCOPY N/A 12/17/2019    Procedure: BRONCHOSCOPY WITH WASHINGS;  Surgeon: Rogelio Tucker MD;  Location: [facility] ENDOSCOPY;  Service: Pulmonary    BRONCHOSCOPY N/A 07/15/2022    Procedure: BRONCHOSCOPY;  Surgeon: Rogelio Tucker MD;  Location: [facility] ENDOSCOPY;  Service: Pulmonary;  Laterality: N/A;  Pre: Pneumonia  Post: Pneumonia    BRONCHOSCOPY N/A 10/2/2023    Procedure: BRONCHOSCOPY WITH BRONCHIAL AVEOLAR LAVAGE;  Surgeon: Rogelio Tucker MD;  Location: [facility];   Service: Pulmonary;  Laterality: N/A;  PRE:BRONCHIECTASIS /   POST: SAME    CATARACT EXTRACTION EXTRACAPSULAR W/ INTRAOCULAR LENS IMPLANTATION      COLONOSCOPY      2013    D & C WITH SUCTION      HYSTERECTOMY      KNEE ARTHROSCOPY Left     Partial         FAMILY HISTORY  Family History   Problem Relation Age of Onset    Hypertension Mother     Stroke Mother     Heart disease Father     Hypertension Father     Cancer Brother     Cancer Son          SOCIAL HISTORY  Social History     Socioeconomic History    Marital status:    Tobacco Use    Smoking status: Never    Smokeless tobacco: Never    Tobacco comments:     caffiene daily   Vaping Use    Vaping status: Never Used   Substance and Sexual Activity    Alcohol use: Not Currently     Alcohol/week: 2.0 standard drinks of alcohol     Types: 1 Glasses of wine, 1 Shots of liquor per week    Drug use: No    Sexual activity: Defer     Partners: Male         ALLERGIES  Amlodipine besylate, Aspirin, Bactrim [sulfamethoxazole-trimethoprim], Erythromycin, Levaquin [levofloxacin], Nitrofurantoin, and Ramipril        REVIEW OF SYSTEMS  Per HPI, otherwise negative.       PHYSICAL EXAM  ED Triage Vitals [12/16/24 1211]   Temp Heart Rate Resp BP SpO2   97.3 °F (36.3 °C) 72 18 144/78 96 %      Temp src Heart Rate Source Patient Position BP Location FiO2 (%)   -- Monitor -- -- --       Physical Exam  Vitals and nursing note reviewed.   Constitutional:       Comments: Frail, chronically ill appearing   HENT:      Head: Normocephalic and atraumatic.      Mouth/Throat:      Mouth: Mucous membranes are moist.   Eyes:      Conjunctiva/sclera: Conjunctivae normal.   Cardiovascular:      Rate and Rhythm: Normal rate and regular rhythm.      Pulses: Normal pulses.      Heart sounds: Normal heart sounds.   Pulmonary:      Effort: Pulmonary effort is normal.      Breath sounds: Wheezing present.   Abdominal:      General: Bowel sounds are normal.      Palpations: Abdomen is soft.       Tenderness: There is abdominal tenderness (mild, diffuse). There is no right CVA tenderness, left CVA tenderness or guarding.   Musculoskeletal:      Comments: No midline cervical spine tenderness. No obvious step-offs or deformities. Strength 5/5 equal to BUE. Sensation intact to light touch. FROM.    No midline thoracic spine tenderness. No obvious step-off or deformities.     Mild, diffuse lumbar spine tenderness. Negative straight-leg exam bilaterally. Strength 5/5 and equal to BLE. Sensation intact to light touch. FROM.     Skin:     General: Skin is warm.      Capillary Refill: Capillary refill takes less than 2 seconds.   Neurological:      Mental Status: She is alert and oriented to person, place, and time. Mental status is at baseline.   Psychiatric:         Mood and Affect: Mood normal.               LAB RESULTS  Recent Results (from the past 24 hours)   Comprehensive Metabolic Panel    Collection Time: 12/16/24 12:41 PM    Specimen: Blood   Result Value Ref Range    Glucose 145 (H) 65 - 99 mg/dL    BUN 10 8 - 23 mg/dL    Creatinine 0.94 0.57 - 1.00 mg/dL    Sodium 141 136 - 145 mmol/L    Potassium 4.0 3.5 - 5.2 mmol/L    Chloride 105 98 - 107 mmol/L    CO2 28.0 22.0 - 29.0 mmol/L    Calcium 9.5 8.2 - 9.6 mg/dL    Total Protein 6.6 6.0 - 8.5 g/dL    Albumin 3.7 3.5 - 5.2 g/dL    ALT (SGPT) 22 1 - 33 U/L    AST (SGOT) 27 1 - 32 U/L    Alkaline Phosphatase 117 39 - 117 U/L    Total Bilirubin 0.4 0.0 - 1.2 mg/dL    Globulin 2.9 gm/dL    A/G Ratio 1.3 g/dL    BUN/Creatinine Ratio 10.6 7.0 - 25.0    Anion Gap 8.0 5.0 - 15.0 mmol/L    eGFR 56.3 (L) >60.0 mL/min/1.73   Lipase    Collection Time: 12/16/24 12:41 PM    Specimen: Blood   Result Value Ref Range    Lipase 28 13 - 60 U/L   Green Top (Gel)    Collection Time: 12/16/24 12:41 PM   Result Value Ref Range    Extra Tube Hold for add-ons.    Lavender Top    Collection Time: 12/16/24 12:41 PM   Result Value Ref Range    Extra Tube hold for add-on    Gold Top -  SST    Collection Time: 12/16/24 12:41 PM   Result Value Ref Range    Extra Tube Hold for add-ons.    Light Blue Top    Collection Time: 12/16/24 12:41 PM   Result Value Ref Range    Extra Tube Hold for add-ons.    CBC Auto Differential    Collection Time: 12/16/24 12:41 PM    Specimen: Blood   Result Value Ref Range    WBC 11.47 (H) 3.40 - 10.80 10*3/mm3    RBC 3.36 (L) 3.77 - 5.28 10*6/mm3    Hemoglobin 11.1 (L) 12.0 - 15.9 g/dL    Hematocrit 34.0 34.0 - 46.6 %    .2 (H) 79.0 - 97.0 fL    MCH 33.0 26.6 - 33.0 pg    MCHC 32.6 31.5 - 35.7 g/dL    RDW 13.8 12.3 - 15.4 %    RDW-SD 50.8 37.0 - 54.0 fl    MPV 10.7 6.0 - 12.0 fL    Platelets 297 140 - 450 10*3/mm3    Neutrophil % 80.3 (H) 42.7 - 76.0 %    Lymphocyte % 9.2 (L) 19.6 - 45.3 %    Monocyte % 7.1 5.0 - 12.0 %    Eosinophil % 1.4 0.3 - 6.2 %    Basophil % 0.5 0.0 - 1.5 %    Immature Grans % 1.5 (H) 0.0 - 0.5 %    Neutrophils, Absolute 9.21 (H) 1.70 - 7.00 10*3/mm3    Lymphocytes, Absolute 1.06 0.70 - 3.10 10*3/mm3    Monocytes, Absolute 0.81 0.10 - 0.90 10*3/mm3    Eosinophils, Absolute 0.16 0.00 - 0.40 10*3/mm3    Basophils, Absolute 0.06 0.00 - 0.20 10*3/mm3    Immature Grans, Absolute 0.17 (H) 0.00 - 0.05 10*3/mm3    nRBC 1.3 (H) 0.0 - 0.2 /100 WBC   High Sensitivity Troponin T    Collection Time: 12/16/24 12:41 PM    Specimen: Blood   Result Value Ref Range    HS Troponin T 37 (H) <14 ng/L   BNP    Collection Time: 12/16/24 12:41 PM    Specimen: Blood   Result Value Ref Range    proBNP 5,193.0 (H) 0.0 - 1,800.0 pg/mL   Respiratory Panel PCR w/COVID-19(SARS-CoV-2) ANAI/JORGE/THOM/PAD/COR/NIKHIL In-House, NP Swab in UTM/[facility], 2 HR TAT - Swab, Nasopharynx    Collection Time: 12/16/24  1:04 PM    Specimen: Nasopharynx; Swab   Result Value Ref Range    ADENOVIRUS, PCR Not Detected Not Detected    Coronavirus 229E Not Detected Not Detected    Coronavirus HKU1 Not Detected Not Detected    Coronavirus NL63 Not Detected Not Detected    Coronavirus OC43 Not Detected  Not Detected    COVID19 Not Detected Not Detected - Ref. Range    Human Metapneumovirus Not Detected Not Detected    Human Rhinovirus/Enterovirus Not Detected Not Detected    Influenza A PCR Not Detected Not Detected    Influenza B PCR Not Detected Not Detected    Parainfluenza Virus 1 Not Detected Not Detected    Parainfluenza Virus 2 Not Detected Not Detected    Parainfluenza Virus 3 Not Detected Not Detected    Parainfluenza Virus 4 Not Detected Not Detected    RSV, PCR Not Detected Not Detected    Bordetella pertussis pcr Not Detected Not Detected    Bordetella parapertussis PCR Not Detected Not Detected    Chlamydophila pneumoniae PCR Not Detected Not Detected    Mycoplasma pneumo by PCR Not Detected Not Detected   ECG 12 Lead Dyspnea    Collection Time: 12/16/24  2:42 PM   Result Value Ref Range    QT Interval 314 ms    QTC Interval 376 ms       Ordered the above labs and reviewed the results.        RADIOLOGY  CT Abdomen Pelvis With Contrast, CT Chest With Contrast Diagnostic    Result Date: 12/16/2024  CT CHEST W CONTRAST DIAGNOSTIC-, CT ABDOMEN PELVIS W CONTRAST-  INDICATION: Rule out pneumonia. Shortness of air. Abdominal pain.  COMPARISON: CTA chest November 18, 2024 and CT abdomen pelvis November 18, 2024  TECHNIQUE: Routine CT chest, abdomen and pelvis with IV contrast. Coronal and sagittal reformats. Radiation dose reduction techniques were utilized, including automated exposure control and exposure modulation based on body size.  FINDINGS:  Chest wall: Ill-defined soft tissue seen adjacent to the inferior scapula and chest wall, suspect fibroblastoma dorsi. No lymphadenopathy.  Mediastinum: Coronary artery atherosclerotic calcifications. Moderate to severe cardiomegaly. No pericardial effusion. Calcified left hilar lymph nodes, consistent with prior granulomatous infection. Right infrahilar lymph node, series 2, axial mage 47, measures 1.4 cm, unchanged. No new or enlarging lymph nodes identified.   Lungs/pleura: Trace bilateral pleural fluid. Occlusion of the superior segmental bronchus left lower lobe, with adjacent calcified hilar lymph nodes, unchanged. Airways wall thickening. Biapical pleural-parenchymal thickening. Mild mosaic attenuation, suspect air trapping. Linear opacities in the lower lobes, suspect subsegmental atelectasis or scarring. Calcified pulmonary nodule in the superior segment left lower lobe, consistent with prior granulomatous infection. Multifocal centrilobular and tree-in-bud nodules, similar to prior. For example, centrilobular and tree-in-bud nodules in the apical upper lobes on series 3, axial mage 23. Inferior right upper lobe peripheral opacity, with volume loss and distortion. Bronchovascular right middle lobe opacities, with volume loss and distortion. Small lingular opacity, with volume loss and distortion. Suspect subsegmental airway impaction in the anterior segment left upper lobe.  ABDOMEN: Hepatic steatosis. Calcifications in the liver and spleen, consistent with prior granulomatous infection. Multiple fluid attenuating hepatic cysts. Low attenuating liver lesions measuring less than 1 cm too small to characterize, unchanged. Gallbladder full of gallstones. No gallbladder wall thickening or surrounding inflammation. No biliary ductal dilatation. Spleen is normal in size. No pancreatic mass or pancreatic ductal dilatation seen. No adrenal nodules. Large fluid attenuating cyst in the left mid kidney, measures 6.7 cm. No solid-appearing renal mass or hydronephrosis.  Pelvis: Underdistended bladder. No bladder calculus. Hysterectomy. Left ovarian cyst, series 2, axial mage 162, measures 2.4 cm, unchanged, suspect benign.  Bowel: No obstruction. Colonic diverticulosis. Left colon and rectum are collapsed. Appendix not identified though no secondary findings of appendicitis.  Abdominal wall: Rectus diastases. Tiny fat-containing umbilical hernia. Pelvic wall scarring.   Retroperitoneum: No lymphadenopathy.  Vasculature: Patent. Ectasia of the infrarenal abdominal aorta measuring 2.0 x 2.0 cm, unchanged. Severe aortoiliac atherosclerotic calcification.  Osseous structures: Mild bilateral hip osteoarthritis. Lumbar facet degenerative arthropathy with grade 1 anterolisthesis of L3 on L4. Old biconcave compression deformity at L4, stable. Old biconcave compression deformities seen at T11 and T12, stable. Mild superior endplate compression deformity at L1, stable. Superior plate compression deformity at T7, with 50% collapse, previously 40% collapse, with subchondral sclerosis beneath the superior endplate. Small biconcave compression deformity seen at T6, with 20% collapse, new in the interval.       1. Airways disease with scattered multiple centrilobular and tree-in-bud nodules, similar to prior. Suspect infectious or inflammatory bronchiolitis. Chronic appearing right middle lobe and lingular opacities, with volume loss and distortion, suspect scarring 2. Moderate to severe cardiomegaly, without edema. 3. Gallbladder full of gallstones. 4. Hepatic steatosis. 5. Colonic diverticulosis. 6. Multiple compression deformities in the thoracic and lumbar spine with progressive collapse seen at T7 and new biconcave compression deformity seen at T6 when compared to CTA chest November 18, 2024.  This report was finalized on 12/16/2024 2:58 PM by Dr. Tres Lopez M.D on Workstation: [id number]      XR Chest 1 View    Result Date: 12/16/2024  XR CHEST 1 VW-  HISTORY: Female who is 89 years-old, hypoxia  TECHNIQUE: Frontal views of the chest  COMPARISON: 8/14/2024  FINDINGS: The heart is enlarged. Aorta is tortuous, calcified. Pulmonary vasculature is mildly congested. Linear likely scarring or atelectasis at the right midlung. Small right basilar atelectasis or infiltrate, there may be minimal right pleural effusion. No pneumothorax. No acute osseous process.       As described.  This  report was finalized on 12/16/2024 1:02 PM by Dr. Steven Garza M.D on Workstation: [id number]       Ordered the above noted radiological studies. Reviewed by me in PACS.        MEDICATIONS GIVEN IN ER  Medications   sodium chloride 0.9 % flush 10 mL (has no administration in time range)   iopamidol (ISOVUE-370) 76 % injection 100 mL (85 mL Intravenous Given 12/16/24 1411)   morphine injection 2 mg (2 mg Intravenous Given 12/16/24 1433)   ondansetron (ZOFRAN) injection 4 mg (4 mg Intravenous Given 12/16/24 1432)           MEDICAL DECISION MAKING, PROGRESS, and CONSULTS    All labs have been independently reviewed by me.  All radiology studies have been reviewed by me and I have also reviewed the radiology report.   EKG's independently viewed and interpreted by me.  Discussion below represents my analysis of pertinent findings related to patient's condition, differential diagnosis, treatment plan and final disposition.    Patient brought in by abdominal pain, labs stable, imaging consistent from resent admission showing gallstones without cholecystitis.  Patient remained tender despite pain medication.  Additionally, we were unable to titrate her off her oxygen and she remained hypoxic on room air.  She will be admitted for surgical evaluation as well as respiratory failure. Daughter would like patient to be admitted.           Shared decision making/consideration for admission: Admit      Orders placed during this visit:  Orders Placed This Encounter   Procedures    Respiratory Panel PCR w/COVID-19(SARS-CoV-2) ANAI/JORGE/THOM/PAD/COR/NIKHIL In-House, NP Swab in UTM/VTM, 2 HR TAT - Swab, Nasopharynx    XR Chest 1 View    CT Abdomen Pelvis With Contrast    CT Chest With Contrast Diagnostic    [place] Draw    Comprehensive Metabolic Panel    Lipase    Urinalysis With Microscopic If Indicated (No Culture) - Urine, Clean Catch    CBC Auto Differential    High Sensitivity Troponin T    BNP    High Sensitivity Troponin T 1Hr     ECG 12 Lead Dyspnea    Insert Peripheral IV    CBC & Differential    Green Top (Gel)    Lavender Top    Gold Top - SST    Light Blue Top         Differential diagnosis include, but not limited to: Pneumonia, COPD exacerbation, cholecystitis, UTI, diverticulitis      Additional orders considered but not ordered: dilaudid     Independent interpretation of labs, radiology studies, and discussions with consultants:    Discussed with Dr. James, who agrees with plan.     ED Course as of 12/18/24 1155   Mon Dec 16, 2024   1303 Patient wears oxygen at night, buy hypoxic to 86% on RA. She was placed on 2L NC and improved, RN attempted to titrate off, but patient dropped back down to 88% so she placed patient back on oxygen.  [JG]   1324 Per my independent interpretation of the chest x-ray, there is no obvious pneumothorax.   [JG]   1416 Patient requesting pain medication.  [JG]   1446 EKG interpreted by me demonstrates atrial fibrillation, rate 86, no QT prolongation, ST depressions in lateral leads which were present on previous EKG, no ST elevations [MW]   1519 I updated the patient on current ED work up, including labs and imaging (if performed) with indication for admission. All questions and concerns addressed and ready for admit at this time.      [JG]   1543 Discussed with Dr. Mooney who agrees to admit [MW]      ED Course User Index  [JG] Shruti Yanez APRN  [MW] Karel James MD             DIAGNOSIS  Final diagnoses:   Acute abdominal pain   Acute on chronic respiratory failure with hypoxia   Calculus of gallbladder without cholecystitis without obstruction   Abnormal CT of the abdomen   Compression deformity of vertebra         DISPOSITION  admit        Latest Documented Vital Signs:  As of 15:08 EST  BP- 125/70 HR- 90 Temp- 97.3 °F (36.3 °C) O2 sat- 93%              --    Please note that portions of this were completed with a voice recognition program.       Note Disclaimer: At [facility], we  believe that sharing information builds trust and better relationships. You are receiving this note because you are receiving care at [facility] or recently visited. It is possible you will see health information before a provider has talked with you about it. This kind of information can be easy to misunderstand. To help you fully understand what it means for your health, we urge you to discuss this note with your provider.             Shruti Yanez, PJ  12/18/24 [id number]

## 2024-12-16 NOTE — ED NOTES
"Nursing report ED to floor  Agnieszka Rizzo  89 y.o.  female    HPI :  HPI  Stated Reason for Visit: abdominal pain. patient has known gallstones  History Obtained From: patient, EMS    Chief Complaint  Chief Complaint   Patient presents with    Abdominal Pain       Admitting doctor:   Carlene Mooney MD    Admitting diagnosis:   The primary encounter diagnosis was Acute abdominal pain. Diagnoses of Acute on chronic respiratory failure with hypoxia, Calculus of gallbladder without cholecystitis without obstruction, Abnormal CT of the abdomen, and Compression deformity of vertebra were also pertinent to this visit.    Code status:   Current Code Status       Date Active Code Status Order ID Comments User Context       12/16/2024 1628 CPR (Attempt to Resuscitate) [id number]  Carlene Mooney MD ED        Question Answer    Code Status (Patient has no pulse and is not breathing) CPR (Attempt to Resuscitate)    Medical Interventions (Patient has pulse or is breathing) Full                    Allergies:   Amlodipine besylate, Aspirin, Bactrim [sulfamethoxazole-trimethoprim], Erythromycin, Levaquin [levofloxacin], Nitrofurantoin, and Ramipril    Isolation:   No active isolations    Intake and Output  No intake or output data in the 24 hours ending 12/16/24 1637    Weight:       12/16/24  1607   Weight: 56.7 kg (125 lb)       Most recent vitals:   Vitals:    12/16/24 1531 12/16/24 1607 12/16/24 1611 12/16/24 1631   BP: 137/79   125/87   BP Location:       Patient Position:       Pulse: 85  81 95   Resp:   18    Temp:       SpO2: 94%  92% 97%   Weight:  56.7 kg (125 lb)     Height:  160 cm (63\")         Active LDAs/IV Access:   Lines, Drains & Airways       Active LDAs       Name Placement date Placement time Site Days    Peripheral IV 12/16/24 1242 Left Antecubital 12/16/24  1242  Antecubital  less than 1                    Labs (abnormal labs have a star):   Labs Reviewed   COMPREHENSIVE METABOLIC PANEL - " Abnormal; Notable for the following components:       Result Value    Glucose 145 (*)     eGFR 56.3 (*)     All other components within normal limits    Narrative:     GFR Categories in Chronic Kidney Disease (CKD)      GFR Category          GFR (mL/min/1.73)    Interpretation  G1                     90 or greater         Normal or high (1)  G2                      60-89                Mild decrease (1)  G3a                   45-59                Mild to moderate decrease  G3b                   30-44                Moderate to severe decrease  G4                    15-29                Severe decrease  G5                    14 or less           Kidney failure          (1)In the absence of evidence of kidney disease, neither GFR category G1 or G2 fulfill the criteria for CKD.    eGFR calculation 2021 CKD-EPI creatinine equation, which does not include race as a factor   CBC WITH AUTO DIFFERENTIAL - Abnormal; Notable for the following components:    WBC 11.47 (*)     RBC 3.36 (*)     Hemoglobin 11.1 (*)     .2 (*)     Neutrophil % 80.3 (*)     Lymphocyte % 9.2 (*)     Immature Grans % 1.5 (*)     Neutrophils, Absolute 9.21 (*)     Immature Grans, Absolute 0.17 (*)     nRBC 1.3 (*)     All other components within normal limits   TROPONIN - Abnormal; Notable for the following components:    HS Troponin T 37 (*)     All other components within normal limits    Narrative:     High Sensitive Troponin T Reference Range:  <14.0 ng/L- Negative Female for AMI  <22.0 ng/L- Negative Male for AMI  >=14 - Abnormal Female indicating possible myocardial injury.  >=22 - Abnormal Male indicating possible myocardial injury.   Clinicians would have to utilize clinical acumen, EKG, Troponin, and serial changes to determine if it is an Acute Myocardial Infarction or myocardial injury due to an underlying chronic condition.        BNP (IN-HOUSE) - Abnormal; Notable for the following components:    proBNP 5,193.0 (*)     All other  components within normal limits    Narrative:     This assay is used as an aid in the diagnosis of individuals suspected of having heart failure. It can be used as an aid in the diagnosis of acute decompensated heart failure (ADHF) in patients presenting with signs and symptoms of ADHF to the emergency department (ED). In addition, NT-proBNP of <300 pg/mL indicates ADHF is not likely.    Age Range Result Interpretation  NT-proBNP Concentration (pg/mL:      <50             Positive            >450                   Gray                 300-450                    Negative             <300    50-75           Positive            >900                  Gray                300-900                  Negative            <300      >75             Positive            >1800                  Gray                300-1800                  Negative            <300   HIGH SENSITIVITIY TROPONIN T 1HR - Abnormal; Notable for the following components:    HS Troponin T 31 (*)     All other components within normal limits    Narrative:     High Sensitive Troponin T Reference Range:  <14.0 ng/L- Negative Female for AMI  <22.0 ng/L- Negative Male for AMI  >=14 - Abnormal Female indicating possible myocardial injury.  >=22 - Abnormal Male indicating possible myocardial injury.   Clinicians would have to utilize clinical acumen, EKG, Troponin, and serial changes to determine if it is an Acute Myocardial Infarction or myocardial injury due to an underlying chronic condition.        RESPIRATORY PANEL PCR W/ COVID-19 (SARS-COV-2), NP SWAB IN UTM/VTP, 2 HR TAT - Normal    Narrative:     In the setting of a positive respiratory panel with a viral infection PLUS a negative procalcitonin without other underlying concern for bacterial infection, consider observing off antibiotics or discontinuation of antibiotics and continue supportive care. If the respiratory panel is positive for atypical bacterial infection (Bordetella pertussis, Chlamydophila  pneumoniae, or Mycoplasma pneumoniae), consider antibiotic de-escalation to target atypical bacterial infection.   LIPASE - Normal   RAINBOW DRAW    Narrative:     The following orders were created for panel order [place] Draw.  Procedure                               Abnormality         Status                     ---------                               -----------         ------                     Green Top (Gel)[[id number]]                                  Final result               Lavender Top[[id number]]                                     Final result               Gold Top - SST[[id number]]                                   Final result               Light Blue Top[[id number]]                                   Final result                 Please view results for these tests on the individual orders.   URINALYSIS W/ MICROSCOPIC IF INDICATED (NO CULTURE)   CBC AND DIFFERENTIAL    Narrative:     The following orders were created for panel order CBC & Differential.  Procedure                               Abnormality         Status                     ---------                               -----------         ------                     CBC Auto Differential[[id number]]        Abnormal            Final result                 Please view results for these tests on the individual orders.   GREEN TOP   LAVENDER TOP   GOLD TOP - SST   LIGHT BLUE TOP       EKG:   ECG 12 Lead Dyspnea   Preliminary Result   HEART RATE=86  bpm   RR Gzvvvghc=978  ms   SD Interval=  ms   P Horizontal Axis=  deg   P Front Axis=  deg   QRSD Interval=75  ms   QT Hufdxwfw=578  ms   CSkE=623  ms   QRS Axis=-1  deg   T Wave Axis=202  deg   - ABNORMAL ECG -   Atrial fibrillation   Repol abnrm, severe global ischemia (LM/MVD)   Date and Time of Study:2024-12-16 14:42:32          Meds given in ED:   Medications   sodium chloride 0.9 % flush 10 mL (has no administration in time range)   acetaminophen (TYLENOL) tablet 650 mg (has no administration in  time range)     Or   acetaminophen (TYLENOL) 160 MG/5ML oral solution 650 mg (has no administration in time range)     Or   acetaminophen (TYLENOL) suppository 650 mg (has no administration in time range)   ondansetron ODT (ZOFRAN-ODT) disintegrating tablet 4 mg (has no administration in time range)     Or   ondansetron (ZOFRAN) injection 4 mg (has no administration in time range)   melatonin tablet 2.5 mg (has no administration in time range)   sennosides-docusate (PERICOLACE) 8.6-50 MG per tablet 2 tablet (has no administration in time range)     And   polyethylene glycol (MIRALAX) packet 17 g (has no administration in time range)     And   bisacodyl (DULCOLAX) EC tablet 5 mg (has no administration in time range)     And   bisacodyl (DULCOLAX) suppository 10 mg (has no administration in time range)   iopamidol (ISOVUE-370) 76 % injection 100 mL (85 mL Intravenous Given 12/16/24 1411)   morphine injection 2 mg (2 mg Intravenous Given 12/16/24 1433)   ondansetron (ZOFRAN) injection 4 mg (4 mg Intravenous Given 12/16/24 1432)   ipratropium-albuterol (DUO-NEB) nebulizer solution 3 mL (3 mL Nebulization Given 12/16/24 1611)   methylPREDNISolone sodium succinate (SOLU-Medrol) injection 125 mg (125 mg Intravenous Given 12/16/24 1607)       Imaging results:  CT Abdomen Pelvis With Contrast    Result Date: 12/16/2024   1. Airways disease with scattered multiple centrilobular and tree-in-bud nodules, similar to prior. Suspect infectious or inflammatory bronchiolitis. Chronic appearing right middle lobe and lingular opacities, with volume loss and distortion, suspect scarring 2. Moderate to severe cardiomegaly, without edema. 3. Gallbladder full of gallstones. 4. Hepatic steatosis. 5. Colonic diverticulosis. 6. Multiple compression deformities in the thoracic and lumbar spine with progressive collapse seen at T7 and new biconcave compression deformity seen at T6 when compared to CTA chest November 18, 2024.  This report was  finalized on 12/16/2024 2:58 PM by Dr. Tres Lopez M.D on Workstation: [id number]      CT Chest With Contrast Diagnostic    Result Date: 12/16/2024   1. Airways disease with scattered multiple centrilobular and tree-in-bud nodules, similar to prior. Suspect infectious or inflammatory bronchiolitis. Chronic appearing right middle lobe and lingular opacities, with volume loss and distortion, suspect scarring 2. Moderate to severe cardiomegaly, without edema. 3. Gallbladder full of gallstones. 4. Hepatic steatosis. 5. Colonic diverticulosis. 6. Multiple compression deformities in the thoracic and lumbar spine with progressive collapse seen at T7 and new biconcave compression deformity seen at T6 when compared to CTA chest November 18, 2024.  This report was finalized on 12/16/2024 2:58 PM by Dr. Tres Lopez M.D on Workstation: [id number]      XR Chest 1 View    Result Date: 12/16/2024  As described.  This report was finalized on 12/16/2024 1:02 PM by Dr. Steven Garza M.D on Workstation: [id number]       Ambulatory status:   - assist    Social issues:   Social History     Socioeconomic History    Marital status:    Tobacco Use    Smoking status: Never    Smokeless tobacco: Never    Tobacco comments:     caffiene daily   Vaping Use    Vaping status: Never Used   Substance and Sexual Activity    Alcohol use: Not Currently     Alcohol/week: 2.0 standard drinks of alcohol     Types: 1 Glasses of wine, 1 Shots of liquor per week    Drug use: No    Sexual activity: Defer     Partners: Male       Peripheral Neurovascular       Neuro Cognitive       Learning  Learning Assessment  Learning Readiness and Ability: cognitive limitation noted  Education Provided  Person Taught: patient    Respiratory  Breath Sounds Post-Respiratory Treatment  Breath Sounds Posttreatment All Fields: All Fields  Breath Sounds Posttreatment All Fields: Anterior:, crackles, fine, wheezes, inspiratory    Abdominal Pain       Pain  Assessments  Pain (Adult)  (0-10) Pain Rating: Rest: 10  (0-10) Pain Rating: Activity: 10  Pain Location: abdomen    NIH Stroke Scale       Kamini Palomino RN  12/16/24 16:37 EST

## 2024-12-16 NOTE — ED PROVIDER NOTES
EMERGENCY DEPARTMENT MD ATTESTATION NOTE    Room Number:  08/08  PCP: Matt Rose MD  Independent Historians: Patient    HPI:  A complete HPI/ROS/PMH/PSH/SH/FH are unobtainable due to: Dementia    Context: Agnieszka Rizzo is a 89 y.o. female with a medical history of A-fib, CAD, dementia, HTN, HLD, MVP who presents to the ED c/o acute abdominal pain.  Patient with history of gallstones.  Pain has been occurring for approximately 24 hours.  Patient is additionally noted congestion and nonproductive cough.  Patient found to be hypoxic in the emergency department.  History limited by patient's dementia.        PHYSICAL EXAM    I have reviewed the triage vital signs and nursing notes.    ED Triage Vitals   Temp Heart Rate Resp BP SpO2   12/16/24 1211 12/16/24 1211 12/16/24 1211 12/16/24 1211 12/16/24 1211   97.3 °F (36.3 °C) 72 18 144/78 96 %      Temp src Heart Rate Source Patient Position BP Location FiO2 (%)   -- 12/16/24 1211 12/16/24 1228 12/16/24 1228 --    Monitor Lying Right arm        Physical Exam  GENERAL: alert, no acute distress  SKIN: Warm, dry  HENT: Normocephalic, atraumatic  EYES: no scleral icterus  CV: regular rhythm, regular rate  RESPIRATORY: normal effort, on supplementary O2  ABDOMEN: soft, tenderness to palpation throughout the abdomen but greatest in the right upper quadrant  MUSCULOSKELETAL: no deformity  NEURO: alert, moves all extremities, follows commands            MEDICATIONS GIVEN IN ER  Medications   sodium chloride 0.9 % flush 10 mL (has no administration in time range)   ipratropium-albuterol (DUO-NEB) nebulizer solution 3 mL (has no administration in time range)   methylPREDNISolone sodium succinate (SOLU-Medrol) injection 125 mg (has no administration in time range)   iopamidol (ISOVUE-370) 76 % injection 100 mL (85 mL Intravenous Given 12/16/24 1411)   morphine injection 2 mg (2 mg Intravenous Given 12/16/24 1433)   ondansetron (ZOFRAN) injection 4 mg (4 mg Intravenous Given  12/16/24 1432)         ORDERS PLACED DURING THIS VISIT:  Orders Placed This Encounter   Procedures    Respiratory Panel PCR w/COVID-19(SARS-CoV-2) ANAI/JORGE/THOM/PAD/COR/NIKHIL In-House, NP Swab in UTM/VTM, 2 HR TAT - Swab, Nasopharynx    XR Chest 1 View    CT Abdomen Pelvis With Contrast    CT Chest With Contrast Diagnostic    [place] Draw    Comprehensive Metabolic Panel    Lipase    Urinalysis With Microscopic If Indicated (No Culture) - Urine, Clean Catch    CBC Auto Differential    High Sensitivity Troponin T    BNP    High Sensitivity Troponin T 1Hr    LHA (on-call MD unless specified) Details    ECG 12 Lead Dyspnea    Insert Peripheral IV    Initiate Observation Status    CBC & Differential    Green Top (Gel)    Lavender Top    Gold Top - SST    Light Blue Top         PROCEDURES  Procedures            PROGRESS, DATA ANALYSIS, CONSULTS, AND MEDICAL DECISION MAKING  All labs have been independently interpreted by me.  All radiology studies have been reviewed by me. All EKG's have been independently viewed and interpreted by me.  Discussion below represents my analysis of pertinent findings related to patient's condition, differential diagnosis, treatment plan and final disposition.    Differential diagnosis includes but is not limited to symptomatic cholelithiasis, cholecystitis, choledocholithiasis, pneumonia, viral URI.    Clinical Scores:                                     ED Course as of 12/16/24 1545   Mon Dec 16, 2024   1303 Patient wears oxygen at night, buy hypoxic to 86% on RA. She was placed on 2L NC and improved, RN attempted to titrate off, but patient dropped back down to 88% so she placed patient back on oxygen.  [JG]   1324 Per my independent interpretation of the chest x-ray, there is no obvious pneumothorax.   [JG]   1416 Patient requesting pain medication.  [JG]   1446 EKG interpreted by me demonstrates atrial fibrillation, rate 86, no QT prolongation, ST depressions in lateral leads which were  present on previous EKG, no ST elevations [MW]   1519 I updated the patient on current ED work up, including labs and imaging (if performed) with indication for admission. All questions and concerns addressed and ready for admit at this time.      [JG]   1543 Discussed with Dr. Mooney who agrees to admit [MW]      ED Course User Index  [JG] Shruti Yanez APRN  [MW] Karel James MD       MDM: 89-year-old female presenting for evaluation of abdominal pain.  Pain is greatest in the right upper quadrant.  Patient does have history of cholelithiasis.  Will obtain laboratory evaluation, CT of the abdomen and pelvis.  Will additionally add on workup for hypoxia due to O2 sats in the 80s and patient requiring supplementary O2.  Radiological evaluation demonstrates chronic thoracic fractures that appear to have progressive collapse, cholelithiasis which I suspect is causing her pain and pulmonary findings that could represent infection versus chronic scarring.  Will plan on admission for symptomatic management and further evaluation.      COMPLEXITY OF CARE  The patient requires admission.    Please note that portions of this document were completed with a voice recognition program.    Note Disclaimer: At [facility], we believe that sharing information builds trust and better relationships. You are receiving this note because you recently visited [facility]. It is possible you will see health information before a provider has talked with you about it. This kind of information can be easy to misunderstand. To help you fully understand what it means for your health, we urge you to discuss this note with your provider.         Karel James MD  12/16/24 9516

## 2024-12-16 NOTE — PROGRESS NOTES
Clinical Pharmacy Services: Medication History    Agnieszka Rizzo is a 89 y.o. female presenting to [facility] for   Chief Complaint   Patient presents with    Abdominal Pain       She  has a past medical history of Aspergillus, Asthma, Atrial fibrillation, Atrial flutter, Bronchiectasis, C. difficile diarrhea (03/11/2017), CAD (coronary artery disease), Chronic diastolic CHF (congestive heart failure), Clinical diagnosis of COVID-19 (01/04/2022), Colitis, Cough, Cryoglobulinemia, Dyspnea on exertion, Exposure to hepatitis A (04/20/2018), Fall, Hyperlipidemia, Hypertension, Hyponatremia, Hypoxia, Infectious viral hepatitis, Left shoulder pain, Leg swelling, Lesion of lung, Malignant hyperthermia due to anesthesia, Mild tricuspid regurgitation, MR (mitral regurgitation), MVP (mitral valve prolapse), Permanent atrial fibrillation, Pneumonia of both lungs due to infectious organism (05/31/2017), Pneumonia with the fungal infection aspergillosis (01/06/2017), Pneumothorax, SOB (shortness of breath), Syncope (07/2024), UTI (urinary tract infection), and Wheeze.    Allergies as of 12/16/2024 - Reviewed 12/16/2024   Allergen Reaction Noted    Amlodipine besylate Swelling 11/12/2012    Aspirin GI Intolerance 11/10/2015    Bactrim [sulfamethoxazole-trimethoprim] Nausea And Vomiting 02/08/2017    Erythromycin Unknown (See Comments) 03/11/2013    Levaquin [levofloxacin] Unknown (See Comments) 03/11/2013    Nitrofurantoin Nausea Only and Nausea And Vomiting 05/21/2014    Ramipril Other (See Comments) 11/12/2012       Medication information was obtained from: Family Member   Pharmacy and Phone Number:     Prior to Admission Medications       Prescriptions Last Dose Informant Patient Reported? Taking?    acetaminophen (TYLENOL) 325 MG tablet  Family Member Yes Yes    Take 2 tablets by mouth Every 4 (Four) Hours As Needed for Moderate Pain. Indications: Pain    albuterol sulfate  (90 Base) MCG/ACT inhaler   Family Member No Yes    Inhale 2 puffs Every 6 (Six) Hours As Needed for Wheezing or Shortness of Air.    azithromycin (ZITHROMAX) 250 MG tablet  Family Member Yes Yes    Take 1 tablet by mouth Daily.    benzonatate (TESSALON) 200 MG capsule  Family Member Yes Yes    Take 1 capsule by mouth 3 (Three) Times a Day As Needed for Cough. Indications: Cough    bumetanide (BUMEX) 2 MG tablet  Family Member No Yes    TAKE 1 TABLET TWICE A DAY FOR CARDIAC FAILURE, EDEMA    Patient taking differently:  Take 1 tablet by mouth Daily.    carvedilol (COREG) 3.125 MG tablet  Family Member No Yes    Take 1 tablet by mouth 2 (Two) Times a Day With Meals. Indications: Cardiac Failure, High Blood Pressure Disorder    digoxin (LANOXIN) 125 MCG tablet  Family Member No Yes    Take 1 tablet by mouth Every Other Day. Indications: Atrial Fibrillation, Cardiac Failure    Patient taking differently:  Take 1 tablet by mouth Every Other Day. Last dose 11/17  Indications: Atrial Fibrillation, Cardiac Failure    Eliquis 2.5 MG tablet tablet  Family Member No Yes    TAKE 1 TABLET EVERY 12 HOURS, FULL ANTICOAGULATION    Patient taking differently:  Take 1 tablet by mouth Every 12 (Twelve) Hours.    FeroSul 325 (65 Fe) MG tablet  Family Member No Yes    TAKE ONE TABLET BY MOUTH DAILY WITH BREAKFAST    Patient taking differently:  Take 1 tablet by mouth Daily.    fexofenadine (ALLEGRA) 180 MG tablet  Family Member Yes Yes    Take 1 tablet by mouth Daily. Indications: Hayfever    fluticasone-salmeterol (ADVAIR HFA) 115-21 MCG/ACT inhaler  Family Member Yes Yes    Inhale 2 puffs 2 (Two) Times a Day. Indications: Asthma    guaiFENesin (MUCINEX) 600 MG 12 hr tablet  Family Member Yes Yes    Take 1 tablet by mouth 2 (Two) Times a Day. Indications: Cough    ipratropium-albuterol (DUO-NEB) 0.5-2.5 mg/3 ml nebulizer  Family Member Yes Yes    Take 3 mL by nebulization Daily. Indications: Asthma    Multiple Vitamins-Minerals (PRESERVISION AREDS PO)  Family  Member Yes Yes    Take 1 tablet by mouth 2 (Two) Times a Day. Indications: Vitamin and/or Mineral Deficiency    potassium chloride (KLOR-CON M20) 20 MEQ CR tablet  Family Member No Yes    Take 1 tablet by mouth Daily. Indications: Low Amount of Potassium in the Blood    predniSONE (DELTASONE) 10 MG tablet  Family Member Yes Yes    Take 1 tablet by mouth Daily.    sertraline (ZOLOFT) 25 MG tablet  Family Member Yes Yes    Take 1 tablet by mouth Daily.    sodium chloride 7 % nebulizer solution nebulizer solution  Family Member No Yes    Take 4 mL by nebulization 2 (Two) Times a Day.    O2 (OXYGEN)  Family Member Yes No    Inhale 2 L/min Every Night. Indications: Oxygen Therapy              Medication notes:     This medication list is complete to the best of my knowledge as of 12/16/2024    Please call if questions.    Christos Tavares  Medication History Technician   [phone]    12/16/2024 17:11 EST

## 2024-12-17 PROBLEM — M80.88XA OSTEOPOROTIC COMPRESSION FRACTURE OF SPINE: Status: ACTIVE | Noted: 2024-12-17

## 2024-12-17 LAB
ANION GAP SERPL CALCULATED.3IONS-SCNC: 10.5 MMOL/L (ref 5–15)
BACTERIA UR QL AUTO: NORMAL /HPF
BILIRUB UR QL STRIP: NEGATIVE
BUN SERPL-MCNC: 15 MG/DL (ref 8–23)
BUN/CREAT SERPL: 15.5 (ref 7–25)
CALCIUM SPEC-SCNC: 9.4 MG/DL (ref 8.2–9.6)
CHLORIDE SERPL-SCNC: 104 MMOL/L (ref 98–107)
CLARITY UR: CLEAR
CO2 SERPL-SCNC: 27.5 MMOL/L (ref 22–29)
COLOR UR: YELLOW
CREAT SERPL-MCNC: 0.97 MG/DL (ref 0.57–1)
DEPRECATED RDW RBC AUTO: 47.8 FL (ref 37–54)
EGFRCR SERPLBLD CKD-EPI 2021: 54.3 ML/MIN/1.73
ERYTHROCYTE [DISTWIDTH] IN BLOOD BY AUTOMATED COUNT: 13.6 % (ref 12.3–15.4)
GLUCOSE SERPL-MCNC: 158 MG/DL (ref 65–99)
GLUCOSE UR STRIP-MCNC: NEGATIVE MG/DL
HCT VFR BLD AUTO: 34.9 % (ref 34–46.6)
HGB BLD-MCNC: 11.3 G/DL (ref 12–15.9)
HGB UR QL STRIP.AUTO: NEGATIVE
HYALINE CASTS UR QL AUTO: NORMAL /LPF
KETONES UR QL STRIP: NEGATIVE
LEUKOCYTE ESTERASE UR QL STRIP.AUTO: NEGATIVE
MCH RBC QN AUTO: 31.5 PG (ref 26.6–33)
MCHC RBC AUTO-ENTMCNC: 32.4 G/DL (ref 31.5–35.7)
MCV RBC AUTO: 97.2 FL (ref 79–97)
NITRITE UR QL STRIP: NEGATIVE
PH UR STRIP.AUTO: 5.5 [PH] (ref 5–8)
PLATELET # BLD AUTO: 263 10*3/MM3 (ref 140–450)
PMV BLD AUTO: 10.3 FL (ref 6–12)
POTASSIUM SERPL-SCNC: 4.4 MMOL/L (ref 3.5–5.2)
PROT UR QL STRIP: ABNORMAL
RBC # BLD AUTO: 3.59 10*6/MM3 (ref 3.77–5.28)
RBC # UR STRIP: NORMAL /HPF
REF LAB TEST METHOD: NORMAL
SODIUM SERPL-SCNC: 142 MMOL/L (ref 136–145)
SP GR UR STRIP: >1.03 (ref 1–1.03)
SQUAMOUS #/AREA URNS HPF: NORMAL /HPF
UROBILINOGEN UR QL STRIP: ABNORMAL
WBC # UR STRIP: NORMAL /HPF
WBC NRBC COR # BLD AUTO: 9.7 10*3/MM3 (ref 3.4–10.8)

## 2024-12-17 PROCEDURE — 25010000002 METHYLPREDNISOLONE PER 40 MG: Performed by: INTERNAL MEDICINE

## 2024-12-17 PROCEDURE — 97162 PT EVAL MOD COMPLEX 30 MIN: CPT | Performed by: PHYSICAL THERAPIST

## 2024-12-17 PROCEDURE — 36415 COLL VENOUS BLD VENIPUNCTURE: CPT | Performed by: INTERNAL MEDICINE

## 2024-12-17 PROCEDURE — 99213 OFFICE O/P EST LOW 20 MIN: CPT | Performed by: NURSE PRACTITIONER

## 2024-12-17 PROCEDURE — 99204 OFFICE O/P NEW MOD 45 MIN: CPT | Performed by: STUDENT IN AN ORGANIZED HEALTH CARE EDUCATION/TRAINING PROGRAM

## 2024-12-17 PROCEDURE — 81001 URINALYSIS AUTO W/SCOPE: CPT | Performed by: NURSE PRACTITIONER

## 2024-12-17 PROCEDURE — 94799 UNLISTED PULMONARY SVC/PX: CPT

## 2024-12-17 PROCEDURE — 25010000002 CEFTRIAXONE PER 250 MG: Performed by: INTERNAL MEDICINE

## 2024-12-17 PROCEDURE — 25010000002 MORPHINE PER 10 MG: Performed by: INTERNAL MEDICINE

## 2024-12-17 PROCEDURE — 94761 N-INVAS EAR/PLS OXIMETRY MLT: CPT

## 2024-12-17 PROCEDURE — 80048 BASIC METABOLIC PNL TOTAL CA: CPT | Performed by: INTERNAL MEDICINE

## 2024-12-17 PROCEDURE — G0378 HOSPITAL OBSERVATION PER HR: HCPCS

## 2024-12-17 PROCEDURE — 85027 COMPLETE CBC AUTOMATED: CPT | Performed by: INTERNAL MEDICINE

## 2024-12-17 PROCEDURE — 94664 DEMO&/EVAL PT USE INHALER: CPT

## 2024-12-17 RX ORDER — PREDNISONE 20 MG/1
20 TABLET ORAL
Status: DISCONTINUED | OUTPATIENT
Start: 2024-12-18 | End: 2024-12-18

## 2024-12-17 RX ADMIN — BUDESONIDE AND FORMOTEROL FUMARATE DIHYDRATE 1 PUFF: 160; 4.5 AEROSOL RESPIRATORY (INHALATION) at 19:16

## 2024-12-17 RX ADMIN — GUAIFENESIN 600 MG: 600 TABLET, MULTILAYER, EXTENDED RELEASE ORAL at 09:01

## 2024-12-17 RX ADMIN — IPRATROPIUM BROMIDE AND ALBUTEROL SULFATE 3 ML: 2.5; .5 SOLUTION RESPIRATORY (INHALATION) at 06:51

## 2024-12-17 RX ADMIN — BUDESONIDE AND FORMOTEROL FUMARATE DIHYDRATE 1 PUFF: 160; 4.5 AEROSOL RESPIRATORY (INHALATION) at 06:57

## 2024-12-17 RX ADMIN — CEFTRIAXONE SODIUM 1000 MG: 1 INJECTION, POWDER, FOR SOLUTION INTRAMUSCULAR; INTRAVENOUS at 21:42

## 2024-12-17 RX ADMIN — Medication 1 TABLET: at 21:14

## 2024-12-17 RX ADMIN — CARVEDILOL 3.12 MG: 3.12 TABLET, FILM COATED ORAL at 18:39

## 2024-12-17 RX ADMIN — GUAIFENESIN 600 MG: 600 TABLET, MULTILAYER, EXTENDED RELEASE ORAL at 21:15

## 2024-12-17 RX ADMIN — BUMETANIDE 2 MG: 2 TABLET ORAL at 09:01

## 2024-12-17 RX ADMIN — DOXYCYCLINE 100 MG: 100 CAPSULE ORAL at 21:14

## 2024-12-17 RX ADMIN — Medication 2.5 MG: at 21:14

## 2024-12-17 RX ADMIN — IPRATROPIUM BROMIDE AND ALBUTEROL SULFATE 3 ML: 2.5; .5 SOLUTION RESPIRATORY (INHALATION) at 12:15

## 2024-12-17 RX ADMIN — SERTRALINE HYDROCHLORIDE 25 MG: 25 TABLET ORAL at 09:01

## 2024-12-17 RX ADMIN — MORPHINE SULFATE 2 MG: 2 INJECTION, SOLUTION INTRAMUSCULAR; INTRAVENOUS at 13:33

## 2024-12-17 RX ADMIN — IPRATROPIUM BROMIDE AND ALBUTEROL SULFATE 3 ML: 2.5; .5 SOLUTION RESPIRATORY (INHALATION) at 19:16

## 2024-12-17 RX ADMIN — METHYLPREDNISOLONE SODIUM SUCCINATE 40 MG: 40 INJECTION, POWDER, FOR SOLUTION INTRAMUSCULAR; INTRAVENOUS at 09:01

## 2024-12-17 RX ADMIN — CARVEDILOL 3.12 MG: 3.12 TABLET, FILM COATED ORAL at 09:01

## 2024-12-17 RX ADMIN — DOXYCYCLINE 100 MG: 100 CAPSULE ORAL at 09:01

## 2024-12-17 RX ADMIN — Medication 1 TABLET: at 09:01

## 2024-12-17 RX ADMIN — MORPHINE SULFATE 2 MG: 2 INJECTION, SOLUTION INTRAMUSCULAR; INTRAVENOUS at 18:42

## 2024-12-17 NOTE — THERAPY EVALUATION
Patient Name: Agnieszka Rizzo  : 1930    MRN: [id number]                              Today's Date: 2024       Admit Date: 2024    Visit Dx:     ICD-10-CM ICD-9-CM   1. Acute abdominal pain  R10.9 789.00     338.19   2. Acute on chronic respiratory failure with hypoxia  J96.21 518.84     799.02   3. Calculus of gallbladder without cholecystitis without obstruction  K80.20 574.20   4. Abnormal CT of the abdomen  R93.5 793.6   5. Compression deformity of vertebra  M43.9 738.5     Patient Active Problem List   Diagnosis    Primary hypertension    Nonobstructive atherosclerosis of coronary artery    Familial hypercholesterolemia    Mitral valve insufficiency    Ventricular premature beats    Ventricular tachycardia    Chronic respiratory failure with hypoxia    DNR (do not resuscitate)    Asymptomatic bacteriuria    Iron deficiency anemia    Hypokalemia    Bronchiectasis    KINA (mycobacterium avium-intracellulare)    Permanent atrial fibrillation    Anxiety    Medicare annual wellness visit, subsequent    Herpes infection    Abnormal EKG    Alteration in anticoagulation    Blind right eye    COPD (chronic obstructive pulmonary disease)    Chronic diastolic CHF (congestive heart failure)    Orthostasis    Nonrheumatic tricuspid valve regurgitation    Pulmonary hypertension    Pericardial effusion    Fracture of greater trochanter of left femur    Leukocytosis    Anemia    Infectious disorder of bronchus    Rhinovirus infection    Calculus of gallbladder without cholecystitis without obstruction    Abdominal pain     Past Medical History:   Diagnosis Date    Aspergillus     Asthma     Atrial fibrillation     chronic    Atrial flutter     Bronchiectasis     C. difficile diarrhea 2017    CAD (coronary artery disease)     nonobstructive    Chronic diastolic CHF (congestive heart failure)     Clinical diagnosis of COVID-19 2022    Colitis     Cough     Cryoglobulinemia     Dyspnea on exertion      Exposure to hepatitis A 04/20/2018    Fall     Hyperlipidemia     Hypertension     Hyponatremia     Hypoxia     Infectious viral hepatitis     AGE 13    Left shoulder pain     Leg swelling     Lesion of lung     Malignant hyperthermia due to anesthesia     Mild tricuspid regurgitation     MR (mitral regurgitation)     mild    MVP (mitral valve prolapse)     Permanent atrial fibrillation     Pneumonia of both lungs due to infectious organism 05/31/2017    Followed BY ID for MAC      Pneumonia with the fungal infection aspergillosis 01/06/2017    Pneumothorax     SOB (shortness of breath)     Syncope 07/2024    UTI (urinary tract infection)     Wheeze     mild     Past Surgical History:   Procedure Laterality Date    BRONCHOSCOPY N/A 11/12/2016    Procedure: BRONCHOSCOPY WITH FLUORO, BRUSHINGS, BAL, AND BIOPSIES;  Surgeon: Rogelio Tucker MD;  Location: [facility] ENDOSCOPY;  Service:     BRONCHOSCOPY Bilateral 06/03/2017    Procedure: BRONCHOSCOPY with BAL ;  Surgeon: Sung King MD;  Location: [facility] ENDOSCOPY;  Service:     BRONCHOSCOPY N/A 12/17/2019    Procedure: BRONCHOSCOPY WITH WASHINGS;  Surgeon: Rogelio Tucker MD;  Location: [facility] ENDOSCOPY;  Service: Pulmonary    BRONCHOSCOPY N/A 07/15/2022    Procedure: BRONCHOSCOPY;  Surgeon: Rogelio Tucker MD;  Location: [facility] ENDOSCOPY;  Service: Pulmonary;  Laterality: N/A;  Pre: Pneumonia  Post: Pneumonia    BRONCHOSCOPY N/A 10/2/2023    Procedure: BRONCHOSCOPY WITH BRONCHIAL AVEOLAR LAVAGE;  Surgeon: Rogelio Tucker MD;  Location: [facility] ENDOSCOPY;  Service: Pulmonary;  Laterality: N/A;  PRE:BRONCHIECTASIS /   POST: SAME    CATARACT EXTRACTION EXTRACAPSULAR W/ INTRAOCULAR LENS IMPLANTATION      COLONOSCOPY      2013    D & C WITH SUCTION      HYSTERECTOMY      KNEE ARTHROSCOPY Left     Partial      General Information       Row Name 12/17/24 1234          Physical Therapy Time and Intention    Document Type evaluation  -     Mode of Treatment individual therapy;physical  therapy  -       Row Name 12/17/24 1234          General Information    Patient Profile Reviewed yes  -     Prior Level of Function independent:  -     Existing Precautions/Restrictions no known precautions/restrictions  -     Barriers to Rehab none identified  -       Row Name 12/17/24 1234          Living Environment    People in Home facility resident  assisted living  -       Row Name 12/17/24 1234          Cognition    Orientation Status (Cognition) oriented to;person;place  -       Row Name 12/17/24 1234          Safety Issues/Impairments Affecting Functional Mobility    Impairments Affecting Function (Mobility) endurance/activity tolerance  -               User Key  (r) = Recorded By, (t) = Taken By, (c) = Cosigned By      Initials Name Provider Type    Cori Smith PT Physical Therapist                   Mobility       Row Name 12/17/24 1236          Bed Mobility    Bed Mobility supine-sit;sit-supine  -     Supine-Sit Leon (Bed Mobility) standby assist  -     Sit-Supine Leon (Bed Mobility) standby assist  -     Assistive Device (Bed Mobility) head of bed elevated  -       Row Name 12/17/24 1236          Sit-Stand Transfer    Sit-Stand Leon (Transfers) standby assist  -     Assistive Device (Sit-Stand Transfers) walker, front-wheeled  -       Row Name 12/17/24 1236          Gait/Stairs (Locomotion)    Leon Level (Gait) contact guard  -     Assistive Device (Gait) walker, front-wheeled  -     Distance in Feet (Gait) 180  -     Deviations/Abnormal Patterns (Gait) gait speed decreased  -     Bilateral Gait Deviations forward flexed posture  -               User Key  (r) = Recorded By, (t) = Taken By, (c) = Cosigned By      Initials Name Provider Type    Cori Smith PT Physical Therapist                   Obj/Interventions       Row Name 12/17/24 1238          Range of Motion Comprehensive    Comment, General  Range of Motion [facility]       Row Name 12/17/24 1238          Strength Comprehensive (MMT)    Comment, General Manual Muscle Testing (MMT) Assessment [facility]       Row Name 12/17/24 1238          Balance    Balance Assessment sitting static balance;sitting dynamic balance;standing static balance;standing dynamic balance  -     Static Sitting Balance independent  -     Dynamic Sitting Balance independent  -KH     Position, Sitting Balance sitting edge of bed  -     Static Standing Balance standby assist  -     Dynamic Standing Balance contact guard  -     Position/Device Used, Standing Balance walker, rolling  -               User Key  (r) = Recorded By, (t) = Taken By, (c) = Cosigned By      Initials Name Provider Type    Cori Smith, EVELIO Physical Therapist                   Goals/Plan    No documentation.                  Clinical Impression       Row Name 12/17/24 1239          Pain    Pretreatment Pain Rating 1/10  -     Posttreatment Pain Rating 1/10  -     Pain Location back  -       Row Name 12/17/24 1239          Plan of Care Review    Plan of Care Reviewed With patient  -     Outcome Evaluation Patient presented to the ER with abdominal pain and hypoxia.  Patient has a history of dementia.  Patient was alert and oriented to person and place.  She was agreeable to therapy and followed commands well.  Patient demonstrated independent bed mobility.  She stood with standby assist and rolling walker.  She ambulated 180 feet with CGA to SBA with rolling walker.  Gait speed decreased as patient fatigued, but no overt gait impairments or loss of balance noted.  Patient was feeling a little short of breath after walking.  O2 was 88% on room air after walking.  Patient recovered quickly once back in bed.  Patient's mobility appears to be at baseline and she is safe to DC back home.  PT will sign off.  Recommend patient ambulate multiple times per day with nursing sta.  ff while  admitted and continue to be up to bathroom with assist  -       Row Name 12/17/24 1239          Therapy Assessment/Plan (PT)    Patient/Family Therapy Goals Statement (PT) plans to return to facility  -     Criteria for Skilled Interventions Met (PT) no problems identified which require skilled intervention  -     Therapy Frequency (PT) evaluation only  -       Row Name 12/17/24 1239          Positioning and Restraints    Pre-Treatment Position in bed  -     Post Treatment Position bed  -     In Bed fowlers;call light within reach;encouraged to call for assist;exit alarm on;notified nsg  -               User Key  (r) = Recorded By, (t) = Taken By, (c) = Cosigned By      Initials Name Provider Type    Cori Smith, EVELIO Physical Therapist                   Outcome Measures       Row Name 12/17/24 1242 12/17/24 0800       How much help from another person do you currently need...    Turning from your back to your side while in flat bed without using bedrails? 4  -KH 4  -KW    Moving from lying on back to sitting on the side of a flat bed without bedrails? 4  -KH 4  -KW    Moving to and from a bed to a chair (including a wheelchair)? 3  -KH 3  -KW    Standing up from a chair using your arms (e.g., wheelchair, bedside chair)? 4  -KH 3  -KW    Climbing 3-5 steps with a railing? 2  -KH 1  -KW    To walk in hospital room? 3  -KH 3  -KW    AM-PAC 6 Clicks Score (PT) 20  -KH 18  -KW    Highest Level of Mobility Goal 6 --> Walk 10 steps or more  - 6 --> Walk 10 steps or more  -KW      Row Name 12/17/24 1242          Functional Assessment    Outcome Measure Options AM-PAC 6 Clicks Basic Mobility (PT)  -               User Key  (r) = Recorded By, (t) = Taken By, (c) = Cosigned By      Initials Name Provider Type    Cori Smith, EVELIO Physical Therapist    Nelia Truong, RN Registered Nurse                                 Physical Therapy Education       Title: PT OT SLP Therapies  (In Progress)       Topic: Physical Therapy (Not Started)       Point: Mobility training (Not Started)       Learner Progress:  Not documented in this visit.              Point: Home exercise program (Not Started)       Learner Progress:  Not documented in this visit.              Point: Body mechanics (Not Started)       Learner Progress:  Not documented in this visit.              Point: Precautions (Not Started)       Learner Progress:  Not documented in this visit.                                  PT Recommendation and Plan     Outcome Evaluation: Patient presented to the ER with abdominal pain and hypoxia.  Patient has a history of dementia.  Patient was alert and oriented to person and place.  She was agreeable to therapy and followed commands well.  Patient demonstrated independent bed mobility.  She stood with standby assist and rolling walker.  She ambulated 180 feet with CGA to SBA with rolling walker.  Gait speed decreased as patient fatigued, but no overt gait impairments or loss of balance noted.  Patient was feeling a little short of breath after walking.  O2 was 88% on room air after walking.  Patient recovered quickly once back in bed.  Patient's mobility appears to be at baseline and she is safe to DC back home.  PT will sign off.  Recommend patient ambulate multiple times per day with nursing sta.  ff while admitted and continue to be up to bathroom with assist     Time Calculation:         PT Charges       Row Name 12/17/24 1315             Time Calculation    Start Time 1100  -      Stop Time 1120  -      Time Calculation (min) 20 min  -KH      PT Received On 12/17/24  -                User Key  (r) = Recorded By, (t) = Taken By, (c) = Cosigned By      Initials Name Provider Type    Cori Smith PT Physical Therapist                  Therapy Charges for Today       Code Description Service Date Service Provider Modifiers Qty    [id number]  PT EVAL MOD COMPLEXITY 2  12/17/2024 Cori Figueredo, PT GP 1            PT G-Codes  Outcome Measure Options: AM-PAC 6 Clicks Basic Mobility (PT)  AM-PAC 6 Clicks Score (PT): 20  PT Discharge Summary  Anticipated Discharge Disposition (PT): assisted living    Cori Figueredo, PT  12/17/2024

## 2024-12-17 NOTE — PLAN OF CARE
Goal Outcome Evaluation:  Plan of Care Reviewed With: patient        Progress: no change  Outcome Evaluation: C/O back pain with cough and moving.  Up to BSC with assist PRN.  Bed alarm on for safety.  Forgetful at times.

## 2024-12-17 NOTE — TELEPHONE ENCOUNTER
Protocol Failed.     NOV - No Future office visits    LOV - 7/30/2024 (TK)    Plan:       1.  Syncope: Of unknown etiology.  The episode, she had bowel incontinence which is rare with orthostasis or vasovagal syncope.  Neurological evaluation was unremarkable.  She has had no further episodes and plans to follow-up with neurology.     2.  Orthostasis: Orthostatic blood pressures negative today.  Positive in the hospital.  I encouraged her to drink plenty of water throughout the day.  She will remain on bumetanide once daily.     3.  Permanent Atrial Fibrillation: Rate controlled on metoprolol.  [id number] score of 6 and remains on apixaban.  Denies bleeding.     4.  Chronically abnormal EKG with ST depression not felt to be ischemic.     5.  Acute on chronic diastolic heart failure.  Well compensated today.     6-9.  Mild to moderate mitral regurgitation, moderate tricuspid regurgitation, moderate pulmonary hypertension, trace aortic regurgitation, and small pericardial effusion noted on echocardiogram in October 2023.  I do not see the need to repeat the echocardiogram.     10.  Nonobstructive coronary atherosclerosis.  Denies angina.     11.  I have asked them to call if she has any further episodes.  I will discuss with Dr. Robbins if she wants any further cardiac testing such as a 14-day Zio patch monitor.  She will follow-up with Dr. Robbins in 3 months.

## 2024-12-17 NOTE — PLAN OF CARE
Problem: Adult Inpatient Plan of Care  Goal: Absence of Hospital-Acquired Illness or Injury  Intervention: Identify and Manage Fall Risk  Recent Flowsheet Documentation  Taken 12/17/2024 1600 by Nelia Hanks RN  Safety Promotion/Fall Prevention: safety round/check completed  Taken 12/17/2024 1333 by Nelia Hanks RN  Safety Promotion/Fall Prevention: safety round/check completed  Taken 12/17/2024 1200 by Nelia Hanks RN  Safety Promotion/Fall Prevention: safety round/check completed  Taken 12/17/2024 1000 by Nelia Hanks RN  Safety Promotion/Fall Prevention: safety round/check completed  Taken 12/17/2024 0800 by Nelia Hanks RN  Safety Promotion/Fall Prevention: safety round/check completed  Intervention: Prevent Skin Injury  Recent Flowsheet Documentation  Taken 12/17/2024 1600 by Nelia Hanks RN  Body Position: position changed independently  Taken 12/17/2024 1333 by Nelia Hanks RN  Body Position: position changed independently  Taken 12/17/2024 1200 by Nelia Hanks RN  Body Position: position changed independently  Taken 12/17/2024 1000 by Nelia Hanks RN  Body Position: position changed independently  Taken 12/17/2024 0800 by Nelia Hanks RN  Body Position: dangle, side of bed  Skin Protection:   incontinence pads utilized   transparent dressing maintained  Goal: Optimal Comfort and Wellbeing  Intervention: Monitor Pain and Promote Comfort  Recent Flowsheet Documentation  Taken 12/17/2024 1333 by Nelia Hanks RN  Pain Management Interventions: pain medication given  Intervention: Provide Person-Centered Care  Recent Flowsheet Documentation  Taken 12/17/2024 0800 by Nelia Hanks RN  Trust Relationship/Rapport: care explained     Problem: Skin Injury Risk Increased  Goal: Skin Health and Integrity  Intervention: Optimize Skin Protection  Recent Flowsheet Documentation  Taken 12/17/2024 1600 by Nelia Hanks RN  Activity Management: activity encouraged  Head of Bed (HOB)  Positioning: HOB elevated  Taken 12/17/2024 1333 by Nelia Hanks RN  Activity Management: activity encouraged  Head of Bed (HOB) Positioning: HOB elevated  Taken 12/17/2024 1200 by Nelia Hanks RN  Activity Management: activity encouraged  Head of Bed (HOB) Positioning: HOB elevated  Taken 12/17/2024 1000 by Nelia Hanks RN  Activity Management: activity encouraged  Head of Bed (HOB) Positioning: HOB elevated  Taken 12/17/2024 0800 by Nelia Hanks RN  Activity Management: activity encouraged  Pressure Reduction Techniques:   frequent weight shift encouraged   weight shift assistance provided  Head of Bed (HOB) Positioning: HOB elevated  Pressure Reduction Devices: alternating pressure pump (JOHANA)  Skin Protection:   incontinence pads utilized   transparent dressing maintained     Problem: Fall Injury Risk  Goal: Absence of Fall and Fall-Related Injury  Intervention: Promote Injury-Free Environment  Recent Flowsheet Documentation  Taken 12/17/2024 1600 by Nelia Hanks RN  Safety Promotion/Fall Prevention: safety round/check completed  Taken 12/17/2024 1333 by Nelia Hanks RN  Safety Promotion/Fall Prevention: safety round/check completed  Taken 12/17/2024 1200 by Nelia Hanks RN  Safety Promotion/Fall Prevention: safety round/check completed  Taken 12/17/2024 1000 by Nelia Hanks RN  Safety Promotion/Fall Prevention: safety round/check completed  Taken 12/17/2024 0800 by Nelia Hanks RN  Safety Promotion/Fall Prevention: safety round/check completed

## 2024-12-17 NOTE — CONSULTS
General Surgery Consultation    Consulting Physician: Archana Fregoso MD  Referring: Carlene Mooney MD    Reason for consultation:   gallstones, abdominal pain    CC:   Abdominal pain    HPI:   The patient is a 89 y.o. female who was brought to the hospital by her daughter yesterday for abdominal pain.  About a month ago she had dull aching pain in her upper abdomen radiating to her back and her daughter was concerned she had problems with CHF and so she brought her to the hospital.  She states her heart was checked out and that was fine but she was instructed that the patient did have gallstones and this could be contributing to her symptoms.  She was not interested in having surgery at the time and was recommended to follow-up outpatient.  She did okay for the next month but then yesterday started having severe stabbing pain, felt like she could not catch her breath, and then noted worsening pain in her lower back and between her shoulder blades.  She had nausea but no vomiting.  She has not had any fevers.  She has not had any jaundice or itching.  She presenting the emergency department where she underwent workup which demonstrated gallstones as well as possible pneumonia.  She was admitted to medicine service, started on antibiotics, and general surgery was consulted for evaluation of gallstones.    The patient has had a hysterectomy.  She is on Eliquis at home for afib, her last dose was yesterday morning.       Past Medical History:  Past Medical History:   Diagnosis Date    Aspergillus     Asthma     Atrial fibrillation     chronic    Atrial flutter     Bronchiectasis     C. difficile diarrhea 03/11/2017    CAD (coronary artery disease)     nonobstructive    Chronic diastolic CHF (congestive heart failure)     Clinical diagnosis of COVID-19 01/04/2022    Colitis     Cough     Cryoglobulinemia     Dyspnea on exertion     Exposure to hepatitis A 04/20/2018    Fall     Hyperlipidemia      Hypertension     Hyponatremia     Hypoxia     Infectious viral hepatitis     AGE 13    Left shoulder pain     Leg swelling     Lesion of lung     Malignant hyperthermia due to anesthesia     Mild tricuspid regurgitation     MR (mitral regurgitation)     mild    MVP (mitral valve prolapse)     Permanent atrial fibrillation     Pneumonia of both lungs due to infectious organism 05/31/2017    Followed BY ID for MAC      Pneumonia with the fungal infection aspergillosis 01/06/2017    Pneumothorax     SOB (shortness of breath)     Syncope 07/2024    UTI (urinary tract infection)     Wheeze     mild     Past Surgical History:  Past Surgical History:   Procedure Laterality Date    BRONCHOSCOPY N/A 11/12/2016    Procedure: BRONCHOSCOPY WITH FLUORO, BRUSHINGS, BAL, AND BIOPSIES;  Surgeon: Rogelio Tucker MD;  Location: [facility] ENDOSCOPY;  Service:     BRONCHOSCOPY Bilateral 06/03/2017    Procedure: BRONCHOSCOPY with BAL ;  Surgeon: Sung King MD;  Location: [facility] ENDOSCOPY;  Service:     BRONCHOSCOPY N/A 12/17/2019    Procedure: BRONCHOSCOPY WITH WASHINGS;  Surgeon: Rogelio Tucker MD;  Location: [facility] ENDOSCOPY;  Service: Pulmonary    BRONCHOSCOPY N/A 07/15/2022    Procedure: BRONCHOSCOPY;  Surgeon: Rogelio Tucker MD;  Location: [facility] ENDOSCOPY;  Service: Pulmonary;  Laterality: N/A;  Pre: Pneumonia  Post: Pneumonia    BRONCHOSCOPY N/A 10/2/2023    Procedure: BRONCHOSCOPY WITH BRONCHIAL AVEOLAR LAVAGE;  Surgeon: Rogelio Tucker MD;  Location: [facility] ENDOSCOPY;  Service: Pulmonary;  Laterality: N/A;  PRE:BRONCHIECTASIS /   POST: SAME    CATARACT EXTRACTION EXTRACAPSULAR W/ INTRAOCULAR LENS IMPLANTATION      COLONOSCOPY      2013    D & C WITH SUCTION      HYSTERECTOMY      KNEE ARTHROSCOPY Left     Partial     Medications:  Medications Prior to Admission   Medication Sig Dispense Refill Last Dose/Taking    acetaminophen (TYLENOL) 325 MG tablet Take 2 tablets by mouth Every 4 (Four) Hours As Needed for Moderate Pain. Indications:  Pain   Taking As Needed    albuterol sulfate  (90 Base) MCG/ACT inhaler Inhale 2 puffs Every 6 (Six) Hours As Needed for Wheezing or Shortness of Air. 8 g 11 Taking As Needed    azithromycin (ZITHROMAX) 250 MG tablet Take 1 tablet by mouth Daily.   Taking    benzonatate (TESSALON) 200 MG capsule Take 1 capsule by mouth 3 (Three) Times a Day As Needed for Cough. Indications: Cough   Taking As Needed    bumetanide (BUMEX) 2 MG tablet TAKE 1 TABLET TWICE A DAY FOR CARDIAC FAILURE, EDEMA (Patient taking differently: Take 1 tablet by mouth Daily.) 180 tablet 3 Taking Differently    carvedilol (COREG) 3.125 MG tablet Take 1 tablet by mouth 2 (Two) Times a Day With Meals. Indications: Cardiac Failure, High Blood Pressure Disorder 180 tablet 3 Taking    digoxin (LANOXIN) 125 MCG tablet Take 1 tablet by mouth Every Other Day. Indications: Atrial Fibrillation, Cardiac Failure (Patient taking differently: Take 1 tablet by mouth Every Other Day. Last dose 11/17  Indications: Atrial Fibrillation, Cardiac Failure)   Taking Differently    Eliquis 2.5 MG tablet tablet TAKE 1 TABLET EVERY 12 HOURS, FULL ANTICOAGULATION (Patient taking differently: Take 1 tablet by mouth Every 12 (Twelve) Hours.) 180 tablet 3 Taking Differently    FeroSul 325 (65 Fe) MG tablet TAKE ONE TABLET BY MOUTH DAILY WITH BREAKFAST (Patient taking differently: Take 1 tablet by mouth Daily.) 90 tablet 0 Taking Differently    fexofenadine (ALLEGRA) 180 MG tablet Take 1 tablet by mouth Daily. Indications: Hayfever   Taking    fluticasone-salmeterol (ADVAIR HFA) 115-21 MCG/ACT inhaler Inhale 2 puffs 2 (Two) Times a Day. Indications: Asthma   Taking    guaiFENesin (MUCINEX) 600 MG 12 hr tablet Take 1 tablet by mouth 2 (Two) Times a Day. Indications: Cough   Taking    ipratropium-albuterol (DUO-NEB) 0.5-2.5 mg/3 ml nebulizer Take 3 mL by nebulization Daily. Indications: Asthma   Taking    Multiple Vitamins-Minerals (PRESERVISION AREDS PO) Take 1 tablet by  mouth 2 (Two) Times a Day. Indications: Vitamin and/or Mineral Deficiency   Taking    potassium chloride (KLOR-CON M20) 20 MEQ CR tablet Take 1 tablet by mouth Daily. Indications: Low Amount of Potassium in the Blood 90 tablet 3 Taking    predniSONE (DELTASONE) 10 MG tablet Take 1 tablet by mouth Daily.   Taking    sertraline (ZOLOFT) 25 MG tablet Take 1 tablet by mouth Daily.   Taking    sodium chloride 7 % nebulizer solution nebulizer solution Take 4 mL by nebulization 2 (Two) Times a Day. 240 mL 0 Taking    O2 (OXYGEN) Inhale 2 L/min Every Night. Indications: Oxygen Therapy          Current Facility-Administered Medications:     acetaminophen (TYLENOL) tablet 650 mg, 650 mg, Oral, Q4H PRN **OR** acetaminophen (TYLENOL) 160 MG/5ML oral solution 650 mg, 650 mg, Oral, Q4H PRN **OR** acetaminophen (TYLENOL) suppository 650 mg, 650 mg, Rectal, Q4H PRN, Carlene Mooney MD    albuterol (PROVENTIL) nebulizer solution 0.083% 2.5 mg/3mL, 2.5 mg, Nebulization, Q6H PRN, Carlene Mooney MD    benzonatate (TESSALON) capsule 200 mg, 200 mg, Oral, TID PRN, Carlene Mooney MD    sennosides-docusate (PERICOLACE) 8.6-50 MG per tablet 2 tablet, 2 tablet, Oral, BID PRN **AND** polyethylene glycol (MIRALAX) packet 17 g, 17 g, Oral, Daily PRN **AND** bisacodyl (DULCOLAX) EC tablet 5 mg, 5 mg, Oral, Daily PRN **AND** bisacodyl (DULCOLAX) suppository 10 mg, 10 mg, Rectal, Daily PRN, Carlene Mooney MD    budesonide-formoterol (SYMBICORT) 160-4.5 MCG/ACT inhaler 1 puff, 1 puff, Inhalation, BID - RT, Carlene Mooney MD, 1 puff at 12/17/24 0657    bumetanide (BUMEX) tablet 2 mg, 2 mg, Oral, Daily, Carlene Mooney MD, 2 mg at 12/17/24 0901    carvedilol (COREG) tablet 3.125 mg, 3.125 mg, Oral, BID With Meals, Carlene Mooney MD, 3.125 mg at 12/17/24 0901    cefTRIAXone (ROCEPHIN) 1,000 mg in sodium chloride 0.9 % 100 mL MBP, 1,000 mg, Intravenous, Q24H, Carlene Mooney MD, Stopped at  12/17/24 0008    doxycycline (MONODOX) capsule 100 mg, 100 mg, Oral, Q12H, Carlene Mooney MD, 100 mg at 12/17/24 0901    guaiFENesin (MUCINEX) 12 hr tablet 600 mg, 600 mg, Oral, BID, Carlene Mooney MD, 600 mg at 12/17/24 0901    ipratropium-albuterol (DUO-NEB) nebulizer solution 3 mL, 3 mL, Nebulization, Q6H While Awake - RT, Carlene Mooney MD, 3 mL at 12/17/24 1215    melatonin tablet 2.5 mg, 2.5 mg, Oral, Nightly PRN, Carlene Mooney MD    melatonin tablet 2.5 mg, 2.5 mg, Oral, Nightly, Lon Henry MD    morphine injection 2 mg, 2 mg, Intravenous, Q4H PRN, Carlene Mooney MD, 2 mg at 12/17/24 1333    multivitamin with minerals 1 tablet, 1 tablet, Oral, BID, Carlene Mooney MD, 1 tablet at 12/17/24 0901    ondansetron ODT (ZOFRAN-ODT) disintegrating tablet 4 mg, 4 mg, Oral, Q6H PRN **OR** ondansetron (ZOFRAN) injection 4 mg, 4 mg, Intravenous, Q6H PRN, Carlene Mooney MD    [START ON 12/18/2024] predniSONE (DELTASONE) tablet 20 mg, 20 mg, Oral, Daily With Breakfast, Lon Henry MD    sertraline (ZOLOFT) tablet 25 mg, 25 mg, Oral, Daily, Carlene Mooney MD, 25 mg at 12/17/24 0901    sodium chloride 0.9 % flush 10 mL, 10 mL, Intravenous, PRN, Shruti Yanez APRN    Allergies:   Allergies   Allergen Reactions    Amlodipine Besylate Swelling    Aspirin GI Intolerance     Caused bleeding ulcers    Bactrim [Sulfamethoxazole-Trimethoprim] Nausea And Vomiting    Erythromycin Unknown (See Comments)     Patient does not recall type of reaction.    Levaquin [Levofloxacin] Unknown (See Comments)     Nausea      Nitrofurantoin Nausea Only and Nausea And Vomiting    Ramipril Other (See Comments)     Cough     Social History:     Denies recreational drug use or tobacco use  Drinks wine a couple of times a week    Family History:   Son had pancreatic cancer; brother had bladder cancer, sister had leukemia  Denies family history of  gallbladder disease    Review of Systems:  Review of systems obtained from patient's daughter as per HPI, patient has dementia and is unable to provide much history     Physical Exam:   Vitals:    12/17/24 1355   BP: 140/71   Pulse: 84   Resp: 18   Temp: 97.5 °F (36.4 °C)   SpO2: 91%     GENERAL: alert, interactive, cooperative, comfortable appearing sitting up in bed  HEENT: no scleral icterus; moist mucous membranes  NECK: Supple  RESPIRATORY: normal work of breathing on room air  CARDIOVASCULAR: Well-perfused, palpable distal pulse  GASTROINTESTINAL: Abdomen soft, nondistended, tender in the bilateral upper quadrants, no rebound or guarding, negative Stevenson sign  MUSCULOSKELETAL: no cyanosis  NEUROLOGIC: alert and oriented, normal speech, no gross focal deficits   SKIN: warm, no rash, no jaundice    Diagnostic workup:     Pertinent labs:   Results from last 7 days   Lab Units 12/17/24  0506 12/16/24  1241   WBC 10*3/mm3 9.70 11.47*   HEMOGLOBIN g/dL 11.3* 11.1*   HEMATOCRIT % 34.9 34.0   PLATELETS 10*3/mm3 263 297     Results from last 7 days   Lab Units 12/17/24  0506 12/16/24  1241   SODIUM mmol/L 142 141   POTASSIUM mmol/L 4.4 4.0   CHLORIDE mmol/L 104 105   CO2 mmol/L 27.5 28.0   BUN mg/dL 15 10   CREATININE mg/dL 0.97 0.94   CALCIUM mg/dL 9.4 9.5   BILIRUBIN mg/dL  --  0.4   ALK PHOS U/L  --  117   ALT (SGPT) U/L  --  22   AST (SGOT) U/L  --  27   GLUCOSE mg/dL 158* 145*   UA positive for 1+ protein  Lactic acid 1.4  Respiratory PCR negative  Lipase 28  proBNP 5193  Troponin T 31 from 37    Imaging:  CT chest abdomen pelvis 12/16/2024 images and report reviewed, the gallbladder is full of stones but there is no evidence of pericholecystic fluid, wall thickening, stranding, or biliary ductal dilation; of note she has new collapse of her thoracic spine from previous CT    CT abdomen and pelvis images and report reviewed from 11/18/2024-The gallbladder looks similar in appearance with stones and contraction  without evidence of acute cholecystitis    Assessment and plan:   The patient is a 89 y.o. female with history of dementia presenting with possible symptomatic cholelithiasis. She has mild tenderness in her bilateral upper abdomen without peritoneal signs.  Her CT demonstrates gallstones without evidence of acute cholecystitis.  Her mild leukocytosis has normalized on antibiotics for possible pneumonia.  She also has significant changes to her degenerative disc disease noted on recent CT which may be contributing to her back pain.      I discussed with her daughter who helps make her medical decisions management options for symptomatic cholelithiasis including continued observation with monitoring for signs and symptoms of acute cholecystitis or other gallbladder complications, treatment of pain with NSAIDs, and avoidance of food triggers such as fried and fatty foods versus surgical management with laparoscopic possible open cholecystectomy.  At present the patient's daughter states she would prefer to avoid surgery.  The patient does not have acute cholecystitis and is stable to follow up with me in the office next week to discuss elective cholecystectomy. I counseled her daughter about signs and symptoms concerning for need to return sooner, and she is agreeable.    Archana Fregoso M.D.  General, Robotic, and Endoscopic Surgery  [facility]    [address], [address]  [place], [place], [address]  P: [phone]  F: [phone]

## 2024-12-17 NOTE — PROGRESS NOTES
[facility] Medicine Services  PROGRESS NOTE    Patient Name: Agnieszka Rizzo  : 1930  MRN: [id number]    Date of Admission: 2024  Primary Care Physician: Matt Rose MD    Subjective   Subjective     CC:  Follow-up abdominal pain  Subjective: Patient states her abdominal pain is Resolved this morning.  She is feeling well.  She does have occasional cough and sputum production.  She is a poor historian.  No family currently at the bedside.  No other new complaints.    No current fevers or chills  No current nausea, vomiting, or diarrhea  No current chest pain or palpitations      Objective   Objective     Vital Signs:   Temp:  [97.3 °F (36.3 °C)-97.8 °F (36.6 °C)] 97.5 °F (36.4 °C)  Heart Rate:  [64-97] 64  Resp:  [18] 18  BP: (120-146)/(68-99) 145/68        Physical Exam:  Constitutional: Awake, alert, elderly appearing  HENT: NCAT, mucous membranes moist, neck supple  Respiratory: Occasional cough is present, congestion, breathing nonlabored  Cardiovascular: Pulse rate is normal, palpable radial pulses  Gastrointestinal:  Soft, nontender, nondistended  Musculoskeletal: Frail and chronically debilitated appearance, BMI is 21  Psychiatric: Appropriate affect, cooperative, conversational  Neurologic: Poor historian, no slurred speech or facial droop, follows commands  Skin: No rashes or jaundice, warm      Results Reviewed:  Results from last 7 days   Lab Units 24  0506 24  1241   WBC 10*3/mm3 9.70 11.47*   HEMOGLOBIN g/dL 11.3* 11.1*   HEMATOCRIT % 34.9 34.0   PLATELETS 10*3/mm3 263 297     Results from last 7 days   Lab Units 24  0506 24  1434 24  1241   SODIUM mmol/L 142  --  141   POTASSIUM mmol/L 4.4  --  4.0   CHLORIDE mmol/L 104  --  105   CO2 mmol/L 27.5  --  28.0   BUN mg/dL 15  --  10   CREATININE mg/dL 0.97  --  0.94   GLUCOSE mg/dL 158*  --  145*   CALCIUM mg/dL 9.4  --  9.5   ALK PHOS U/L  --   --  117   ALT (SGPT) U/L  --   --  22   AST (SGOT) U/L  --    --  27   HSTROP T ng/L  --  31* 37*   PROBNP pg/mL  --   --  5,193.0*     Estimated Creatinine Clearance: 31.2 mL/min (by C-G formula based on SCr of 0.97 mg/dL).    Microbiology Results Abnormal       None            Imaging Results (Last 24 Hours)       Procedure Component Value Units Date/Time    CT Abdomen Pelvis With Contrast [[id number]] Collected: 12/16/24 1433     Updated: 12/16/24 1501    Narrative:      CT CHEST W CONTRAST DIAGNOSTIC-, CT ABDOMEN PELVIS W CONTRAST-     INDICATION: Rule out pneumonia. Shortness of air. Abdominal pain.     COMPARISON: CTA chest November 18, 2024 and CT abdomen pelvis November 18, 2024     TECHNIQUE:  Routine CT chest, abdomen and pelvis with IV contrast. Coronal and  sagittal reformats. Radiation dose reduction techniques were utilized,  including automated exposure control and exposure modulation based on  body size.     FINDINGS:      Chest wall: Ill-defined soft tissue seen adjacent to the inferior  scapula and chest wall, suspect fibroblastoma dorsi. No lymphadenopathy.     Mediastinum: Coronary artery atherosclerotic calcifications. Moderate to  severe cardiomegaly. No pericardial effusion. Calcified left hilar lymph  nodes, consistent with prior granulomatous infection. Right infrahilar  lymph node, series 2, axial mage 47, measures 1.4 cm, unchanged. No new  or enlarging lymph nodes identified.     Lungs/pleura: Trace bilateral pleural fluid. Occlusion of the superior  segmental bronchus left lower lobe, with adjacent calcified hilar lymph  nodes, unchanged. Airways wall thickening. Biapical pleural-parenchymal  thickening. Mild mosaic attenuation, suspect air trapping. Linear  opacities in the lower lobes, suspect subsegmental atelectasis or  scarring. Calcified pulmonary nodule in the superior segment left lower  lobe, consistent with prior granulomatous infection. Multifocal  centrilobular and tree-in-bud nodules, similar to prior. For example,  centrilobular and  tree-in-bud nodules in the apical upper lobes on  series 3, axial mage 23. Inferior right upper lobe peripheral opacity,  with volume loss and distortion. Bronchovascular right middle lobe  opacities, with volume loss and distortion. Small lingular opacity, with  volume loss and distortion. Suspect subsegmental airway impaction in the  anterior segment left upper lobe.     ABDOMEN: Hepatic steatosis. Calcifications in the liver and spleen,  consistent with prior granulomatous infection. Multiple fluid  attenuating hepatic cysts. Low attenuating liver lesions measuring less  than 1 cm too small to characterize, unchanged. Gallbladder full of  gallstones. No gallbladder wall thickening or surrounding inflammation.  No biliary ductal dilatation. Spleen is normal in size. No pancreatic  mass or pancreatic ductal dilatation seen. No adrenal nodules. Large  fluid attenuating cyst in the left mid kidney, measures 6.7 cm. No  solid-appearing renal mass or hydronephrosis.     Pelvis: Underdistended bladder. No bladder calculus. Hysterectomy. Left  ovarian cyst, series 2, axial mage 162, measures 2.4 cm, unchanged,  suspect benign.     Bowel: No obstruction. Colonic diverticulosis. Left colon and rectum are  collapsed. Appendix not identified though no secondary findings of  appendicitis.     Abdominal wall: Rectus diastases. Tiny fat-containing umbilical hernia.  Pelvic wall scarring.     Retroperitoneum: No lymphadenopathy.     Vasculature: Patent. Ectasia of the infrarenal abdominal aorta measuring  2.0 x 2.0 cm, unchanged. Severe aortoiliac atherosclerotic  calcification.     Osseous structures: Mild bilateral hip osteoarthritis. Lumbar facet  degenerative arthropathy with grade 1 anterolisthesis of L3 on L4. Old  biconcave compression deformity at L4, stable. Old biconcave compression  deformities seen at T11 and T12, stable. Mild superior endplate  compression deformity at L1, stable. Superior plate  compression  deformity at T7, with 50% collapse, previously 40% collapse, with  subchondral sclerosis beneath the superior endplate. Small biconcave  compression deformity seen at T6, with 20% collapse, new in the  interval.       Impression:         1. Airways disease with scattered multiple centrilobular and tree-in-bud  nodules, similar to prior. Suspect infectious or inflammatory  bronchiolitis. Chronic appearing right middle lobe and lingular  opacities, with volume loss and distortion, suspect scarring  2. Moderate to severe cardiomegaly, without edema.  3. Gallbladder full of gallstones.  4. Hepatic steatosis.  5. Colonic diverticulosis.  6. Multiple compression deformities in the thoracic and lumbar spine  with progressive collapse seen at T7 and new biconcave compression  deformity seen at T6 when compared to CTA chest November 18, 2024.     This report was finalized on 12/16/2024 2:58 PM by Dr. Tres Lopez M.D on Workstation: [id number]       CT Chest With Contrast Diagnostic [[id number]] Collected: 12/16/24 1433     Updated: 12/16/24 1501    Narrative:      CT CHEST W CONTRAST DIAGNOSTIC-, CT ABDOMEN PELVIS W CONTRAST-     INDICATION: Rule out pneumonia. Shortness of air. Abdominal pain.     COMPARISON: CTA chest November 18, 2024 and CT abdomen pelvis November 18, 2024     TECHNIQUE:  Routine CT chest, abdomen and pelvis with IV contrast. Coronal and  sagittal reformats. Radiation dose reduction techniques were utilized,  including automated exposure control and exposure modulation based on  body size.     FINDINGS:      Chest wall: Ill-defined soft tissue seen adjacent to the inferior  scapula and chest wall, suspect fibroblastoma dorsi. No lymphadenopathy.     Mediastinum: Coronary artery atherosclerotic calcifications. Moderate to  severe cardiomegaly. No pericardial effusion. Calcified left hilar lymph  nodes, consistent with prior granulomatous infection. Right infrahilar  lymph node, series  2, axial mage 47, measures 1.4 cm, unchanged. No new  or enlarging lymph nodes identified.     Lungs/pleura: Trace bilateral pleural fluid. Occlusion of the superior  segmental bronchus left lower lobe, with adjacent calcified hilar lymph  nodes, unchanged. Airways wall thickening. Biapical pleural-parenchymal  thickening. Mild mosaic attenuation, suspect air trapping. Linear  opacities in the lower lobes, suspect subsegmental atelectasis or  scarring. Calcified pulmonary nodule in the superior segment left lower  lobe, consistent with prior granulomatous infection. Multifocal  centrilobular and tree-in-bud nodules, similar to prior. For example,  centrilobular and tree-in-bud nodules in the apical upper lobes on  series 3, axial mage 23. Inferior right upper lobe peripheral opacity,  with volume loss and distortion. Bronchovascular right middle lobe  opacities, with volume loss and distortion. Small lingular opacity, with  volume loss and distortion. Suspect subsegmental airway impaction in the  anterior segment left upper lobe.     ABDOMEN: Hepatic steatosis. Calcifications in the liver and spleen,  consistent with prior granulomatous infection. Multiple fluid  attenuating hepatic cysts. Low attenuating liver lesions measuring less  than 1 cm too small to characterize, unchanged. Gallbladder full of  gallstones. No gallbladder wall thickening or surrounding inflammation.  No biliary ductal dilatation. Spleen is normal in size. No pancreatic  mass or pancreatic ductal dilatation seen. No adrenal nodules. Large  fluid attenuating cyst in the left mid kidney, measures 6.7 cm. No  solid-appearing renal mass or hydronephrosis.     Pelvis: Underdistended bladder. No bladder calculus. Hysterectomy. Left  ovarian cyst, series 2, axial mage 162, measures 2.4 cm, unchanged,  suspect benign.     Bowel: No obstruction. Colonic diverticulosis. Left colon and rectum are  collapsed. Appendix not identified though no secondary  findings of  appendicitis.     Abdominal wall: Rectus diastases. Tiny fat-containing umbilical hernia.  Pelvic wall scarring.     Retroperitoneum: No lymphadenopathy.     Vasculature: Patent. Ectasia of the infrarenal abdominal aorta measuring  2.0 x 2.0 cm, unchanged. Severe aortoiliac atherosclerotic  calcification.     Osseous structures: Mild bilateral hip osteoarthritis. Lumbar facet  degenerative arthropathy with grade 1 anterolisthesis of L3 on L4. Old  biconcave compression deformity at L4, stable. Old biconcave compression  deformities seen at T11 and T12, stable. Mild superior endplate  compression deformity at L1, stable. Superior plate compression  deformity at T7, with 50% collapse, previously 40% collapse, with  subchondral sclerosis beneath the superior endplate. Small biconcave  compression deformity seen at T6, with 20% collapse, new in the  interval.       Impression:         1. Airways disease with scattered multiple centrilobular and tree-in-bud  nodules, similar to prior. Suspect infectious or inflammatory  bronchiolitis. Chronic appearing right middle lobe and lingular  opacities, with volume loss and distortion, suspect scarring  2. Moderate to severe cardiomegaly, without edema.  3. Gallbladder full of gallstones.  4. Hepatic steatosis.  5. Colonic diverticulosis.  6. Multiple compression deformities in the thoracic and lumbar spine  with progressive collapse seen at T7 and new biconcave compression  deformity seen at T6 when compared to CTA chest November 18, 2024.     This report was finalized on 12/16/2024 2:58 PM by Dr. Tres Lopez M.D on Workstation: [id number]       XR Chest 1 View [[id number]] Collected: 12/16/24 1301     Updated: 12/16/24 1305    Narrative:      XR CHEST 1 VW-     HISTORY: Female who is 89 years-old, hypoxia     TECHNIQUE: Frontal views of the chest     COMPARISON: 8/14/2024     FINDINGS: The heart is enlarged. Aorta is tortuous, calcified.  Pulmonary  vasculature is mildly congested. Linear likely scarring or atelectasis  at the right midlung. Small right basilar atelectasis or infiltrate,  there may be minimal right pleural effusion. No pneumothorax. No acute  osseous process.         Impression:      As described.     This report was finalized on 12/16/2024 1:02 PM by Dr. Steven Garza M.D on Workstation: [id number]               Results for orders placed during the hospital encounter of 10/04/23    Adult Transthoracic Echo Complete W/ Cont if Necessary Per Protocol    Interpretation Summary    Left ventricular systolic function is normal. Calculated left ventricular EF = 56%    Normal right ventricular cavity size and systolic function noted.    The left atrial cavity is severely dilated.    The right atrial cavity is severely dilated.    Mild to moderate mitral valve regurgitation is present.    Moderate tricuspid valve regurgitation is present.    Calculated right ventricular systolic pressure from tricuspid regurgitation is 49 mmHg.    There is a small (<1cm) circumferential pericardial effusion. There is no evidence of cardiac tamponade.      I have reviewed the medications:  Scheduled Meds:budesonide-formoterol, 1 puff, Inhalation, BID - RT  bumetanide, 2 mg, Oral, Daily  carvedilol, 3.125 mg, Oral, BID With Meals  cefTRIAXone, 1,000 mg, Intravenous, Q24H  doxycycline, 100 mg, Oral, Q12H  guaiFENesin, 600 mg, Oral, BID  ipratropium-albuterol, 3 mL, Nebulization, Q6H While Awake - RT  methylPREDNISolone sodium succinate, 40 mg, Intravenous, Q8H  multivitamin with minerals, 1 tablet, Oral, BID  sertraline, 25 mg, Oral, Daily      Continuous Infusions:   PRN Meds:.  acetaminophen **OR** acetaminophen **OR** acetaminophen    albuterol    benzonatate    senna-docusate sodium **AND** polyethylene glycol **AND** bisacodyl **AND** bisacodyl    melatonin    Morphine    ondansetron ODT **OR** ondansetron    sodium chloride    Assessment & Plan    Assessment & Plan     Active Hospital Problems    Diagnosis  POA    Abdominal pain [R10.9]  Yes      Resolved Hospital Problems   No resolved problems to display.        Brief Hospital Course to date:  Agnieszka Rizzo is a 89 y.o. female presents the hospital with abdominal pain and cough and was found to have infectious findings and CT chest concerning for possible pneumonia or bronchiectasis exacerbation and many gallstones.    Discussion/plan for today: Discussed with surgery and will follow-up on the recommendations which are pending.  Stop IV steroids and switch to low-dose oral steroids.  Respiratory status looks quite good this morning.  Leukocytosis resolved.  CT images reviewed.    Abdominal pain and gallstones: Resolved, etiology unclear, surgery consult, monitor    Possible pneumonia, bronchiectasis exacerbation, bronchiolitis:  CT noted with infiltrates and tree-in-bud findings.  Currently improving with doxycycline and ceftriaxone.  Leukocytosis resolved.  Initially received IV steroids.    Dementia: Supportive care and symptom treatment.  Noted.  Add low-dose melatonin to promote sleep.    Hypertension: Monitor blood pressure and adjust as needed.    Acute hypoxic respiratory failure: Resolved.    Chronic diastolic CHF: Compensated.  Continue appropriate cardiac medications and monitor volume status.  Previous echocardiogram reviewed    Physical debility: PT.    DVT Prophylaxis:   SCDs      Disposition: Pending    CODE STATUS:   Code Status and Medical Interventions: No CPR (Do Not Attempt to Resuscitate); Limited Support; No intubation (DNI)   Ordered at: 12/16/24 1831     Medical Intervention Limits:    No intubation (DNI)     Code Status (Patient has no pulse and is not breathing):    No CPR (Do Not Attempt to Resuscitate)     Medical Interventions (Patient has pulse or is breathing):    Limited Support       Lon Henry MD  12/17/24

## 2024-12-17 NOTE — CASE MANAGEMENT/SOCIAL WORK
Discharge Planning Assessment  [facility]     Patient Name: Agnieszka Rizzo  MRN: [id number]  Today's Date: 12/17/2024    Admit Date: 12/16/2024    Plan: Return to [facility]   Discharge Needs Assessment       Row Name 12/17/24 1350       Living Environment    People in Home facility resident    Current Living Arrangements independent living facility    Duration at Residence [place]    Potentially Unsafe Housing Conditions none    Primary Care Provided by self;homecare agency    Family Caregiver if Needed child(graciela), adult    Quality of Family Relationships involved;helpful    Able to Return to Prior Arrangements yes       Resource/Environmental Concerns    Resource/Environmental Concerns none    Transportation Concerns none       Transition Planning    Patient/Family Anticipates Transition to home;home with help/services    Patient/Family Anticipated Services at Transition home health care    Transportation Anticipated family or friend will provide       Discharge Needs Assessment    Readmission Within the Last 30 Days no previous admission in last 30 days    Equipment Currently Used at Home oxygen;wheelchair;rollator;shower chair;grab bar  02 at night only thru [facility] with tank    Concerns to be Addressed adjustment to diagnosis/illness    Anticipated Changes Related to Illness none    Equipment Needed After Discharge none    Outpatient/Agency/Support Group Needs outpatient therapy;other (see comments)  facility provides therapy services                   Discharge Plan       Row Name 12/17/24 9484       Plan    Plan Return to [facility]    Patient/Family in Agreement with Plan yes    Plan Comments Spoke with patient alfonso Martinez by phone, pt with history of dementia/forgetfulness, introduced self, explained CCP role and verified face sheet and pharmacy information. Pt lives alone at [facility], operated by [facility], dtr manages her medications, staff reminds her to take them. Pt is  normally IADL's, she has nocturnal oxygen through [facility] with transport tank, rollator, nebulizer, gb, sc and w/c if needed. She has in house therapy at [facility], she has used [facility] and been to [place], would not return to [place]. Plans return home with family to transport, denies dc needs, CCP will follow -Karine ORTIZ                  Continued Care and Services - Admitted Since 12/16/2024    No active coordination exists for this encounter.       Expected Discharge Date and Time       Expected Discharge Date Expected Discharge Time    Dec 19, 2024            Demographic Summary       Row Name 12/17/24 1350       General Information    Admission Type observation      Row Name 12/17/24 1349       General Information    Admission Type observation                   Functional Status       Row Name 12/17/24 1350       Functional Status    Usual Activity Tolerance good    Current Activity Tolerance moderate       Assessment of Health Literacy    Health Literacy Moderate       Functional Status, IADL    Medications assistive person    Meal Preparation assistive person    Housekeeping assistive person    Laundry assistive person    Shopping assistive person       Mental Status    General Appearance WDL WDL       Mental Status Summary    Recent Changes in Mental Status/Cognitive Functioning ability to understand;memory (recent);memory (remote)    Mental Status Comments intermittent confusion, forgetfulness                   Psychosocial    No documentation.                  Abuse/Neglect    No documentation.                  Legal       Row Name 12/17/24 1350       Financial/Legal    Who Manages Finances if Patient Unable children                   Substance Abuse    No documentation.                  Patient Forms    No documentation.                     Karine Shea RN

## 2024-12-17 NOTE — CONSULTS
[facility] NEUROSURGERY CONSULT NOTE    Patient name: Agnieszka Rizzo  Referring Provider: Karel James MD  Reason for Consultation: compression fracture T6, T7    Patient Care Team:  Matt Rose MD as PCP - General (Family Medicine)  Marcie Robbins MD as Cardiologist (Cardiology)  Nilesh Danielle MD as Consulting Physician (Urology)  Lina Morris [facility] as Pharmacist  Lev Mckeon PharmD as Pharmacist (Pharmacy)  Rogelio Tucker MD as Consulting Physician (Pulmonary Disease)    Chief complaint: abdominal pain     Subjective .     History of present illness:    Patient is a 89 y.o. female with history of dementia, lumbar degenerative disc disease, compression fractures secondary to osteoporosis, back pain, HTN, hypokalemia, CHF, COPD, atrial fibrillation on Eliquis.  She also has a history of a left greater trochanter fracture treated conservatively in August 2024 following a fall. She was seen in the emergency room November 18, 2024 for complaints of abdominal pain and upper back pain.  Workup revealed gallstones without gallbladder wall thickening or surrounding inflammation.  There is also finding of chronic T7, T11 and T12 compression fractures.  She was subsequently discharged home.  She returned to [facility] yesterday afternoon for recurrent acute abdominal pain.  CT abdomen pelvis was performed to reevaluate gallstones.  CT abdomen pelvis also revealed progressive collapse of the T7 fracture and a new deformity at T6.  The patient denies any significant thoracolumbar pain.  She feels that her abdominal pain symptoms are improving.  She denies any radiating leg pain, leg weakness or numbness/tingling.  She states that she is ambulatory with a walker at baseline.  She resides in an assisted living nursing facility.  There has been no other imaging of the spine for evaluation however neurosurgery has been asked to evaluate the patient and give recommendations.    Review of  Systems  Review of Systems   Constitutional: Negative.  Negative for chills and fever.   HENT: Negative.     Eyes: Negative.    Respiratory: Negative.     Cardiovascular: Negative.    Gastrointestinal:  Positive for abdominal pain.        Denies any bowel incontinence.  Admitted for abdominal pain but states it is improving.   Endocrine: Negative.    Genitourinary: Negative.         Denies bladder incontinence   Musculoskeletal:  Negative for back pain and neck pain.        Reports some low back pain although it is not debilitating.  Denies thoracic pain.   Skin: Negative.    Allergic/Immunologic: Negative.    Neurological: Negative.  Negative for weakness and numbness.        Denies any seizures or syncopal episodes.   Psychiatric/Behavioral:  Positive for confusion.         Patient has a history of some baseline dementia.   All other systems reviewed and are negative.      History  PAST MEDICAL HISTORY  Past Medical History:   Diagnosis Date    Aspergillus     Asthma     Atrial fibrillation     chronic    Atrial flutter     Bronchiectasis     C. difficile diarrhea 03/11/2017    CAD (coronary artery disease)     nonobstructive    Chronic diastolic CHF (congestive heart failure)     Clinical diagnosis of COVID-19 01/04/2022    Colitis     Cough     Cryoglobulinemia     Dyspnea on exertion     Exposure to hepatitis A 04/20/2018    Fall     Hyperlipidemia     Hypertension     Hyponatremia     Hypoxia     Infectious viral hepatitis     AGE 13    Left shoulder pain     Leg swelling     Lesion of lung     Malignant hyperthermia due to anesthesia     Mild tricuspid regurgitation     MR (mitral regurgitation)     mild    MVP (mitral valve prolapse)     Permanent atrial fibrillation     Pneumonia of both lungs due to infectious organism 05/31/2017    Followed BY ID for MAC      Pneumonia with the fungal infection aspergillosis 01/06/2017    Pneumothorax     SOB (shortness of breath)     Syncope 07/2024    UTI (urinary tract  infection)     Wheeze     mild       PAST SURGICAL HISTORY  Past Surgical History:   Procedure Laterality Date    BRONCHOSCOPY N/A 11/12/2016    Procedure: BRONCHOSCOPY WITH FLUORO, BRUSHINGS, BAL, AND BIOPSIES;  Surgeon: Rogelio Tucker MD;  Location: [facility] ENDOSCOPY;  Service:     BRONCHOSCOPY Bilateral 06/03/2017    Procedure: BRONCHOSCOPY with BAL ;  Surgeon: Sung King MD;  Location: [facility] ENDOSCOPY;  Service:     BRONCHOSCOPY N/A 12/17/2019    Procedure: BRONCHOSCOPY WITH WASHINGS;  Surgeon: Rogelio Tucker MD;  Location: [facility] ENDOSCOPY;  Service: Pulmonary    BRONCHOSCOPY N/A 07/15/2022    Procedure: BRONCHOSCOPY;  Surgeon: Rogelio Tucker MD;  Location: [facility] ENDOSCOPY;  Service: Pulmonary;  Laterality: N/A;  Pre: Pneumonia  Post: Pneumonia    BRONCHOSCOPY N/A 10/2/2023    Procedure: BRONCHOSCOPY WITH BRONCHIAL AVEOLAR LAVAGE;  Surgeon: Rogelio Tucker MD;  Location: [facility] ENDOSCOPY;  Service: Pulmonary;  Laterality: N/A;  PRE:BRONCHIECTASIS /   POST: SAME    CATARACT EXTRACTION EXTRACAPSULAR W/ INTRAOCULAR LENS IMPLANTATION      COLONOSCOPY      2013    D & C WITH SUCTION      HYSTERECTOMY      KNEE ARTHROSCOPY Left     Partial       FAMILY HISTORY  Family History   Problem Relation Age of Onset    Hypertension Mother     Stroke Mother     Heart disease Father     Hypertension Father     Cancer Brother     Cancer Son        SOCIAL HISTORY  Social History     Tobacco Use    Smoking status: Never    Smokeless tobacco: Never    Tobacco comments:     caffiene daily   Vaping Use    Vaping status: Never Used   Substance Use Topics    Alcohol use: Not Currently     Alcohol/week: 2.0 standard drinks of alcohol     Types: 1 Glasses of wine, 1 Shots of liquor per week    Drug use: No     Allergies:  Amlodipine besylate, Aspirin, Bactrim [sulfamethoxazole-trimethoprim], Erythromycin, Levaquin [levofloxacin], Nitrofurantoin, and Ramipril    MEDICATIONS:  Medications Prior to Admission   Medication Sig Dispense Refill Last  Dose/Taking    acetaminophen (TYLENOL) 325 MG tablet Take 2 tablets by mouth Every 4 (Four) Hours As Needed for Moderate Pain. Indications: Pain   Taking As Needed    albuterol sulfate  (90 Base) MCG/ACT inhaler Inhale 2 puffs Every 6 (Six) Hours As Needed for Wheezing or Shortness of Air. 8 g 11 Taking As Needed    azithromycin (ZITHROMAX) 250 MG tablet Take 1 tablet by mouth Daily.   Taking    benzonatate (TESSALON) 200 MG capsule Take 1 capsule by mouth 3 (Three) Times a Day As Needed for Cough. Indications: Cough   Taking As Needed    bumetanide (BUMEX) 2 MG tablet TAKE 1 TABLET TWICE A DAY FOR CARDIAC FAILURE, EDEMA (Patient taking differently: Take 1 tablet by mouth Daily.) 180 tablet 3 Taking Differently    carvedilol (COREG) 3.125 MG tablet Take 1 tablet by mouth 2 (Two) Times a Day With Meals. Indications: Cardiac Failure, High Blood Pressure Disorder 180 tablet 3 Taking    digoxin (LANOXIN) 125 MCG tablet Take 1 tablet by mouth Every Other Day. Indications: Atrial Fibrillation, Cardiac Failure (Patient taking differently: Take 1 tablet by mouth Every Other Day. Last dose 11/17  Indications: Atrial Fibrillation, Cardiac Failure)   Taking Differently    Eliquis 2.5 MG tablet tablet TAKE 1 TABLET EVERY 12 HOURS, FULL ANTICOAGULATION (Patient taking differently: Take 1 tablet by mouth Every 12 (Twelve) Hours.) 180 tablet 3 Taking Differently    FeroSul 325 (65 Fe) MG tablet TAKE ONE TABLET BY MOUTH DAILY WITH BREAKFAST (Patient taking differently: Take 1 tablet by mouth Daily.) 90 tablet 0 Taking Differently    fexofenadine (ALLEGRA) 180 MG tablet Take 1 tablet by mouth Daily. Indications: Hayfever   Taking    fluticasone-salmeterol (ADVAIR HFA) 115-21 MCG/ACT inhaler Inhale 2 puffs 2 (Two) Times a Day. Indications: Asthma   Taking    guaiFENesin (MUCINEX) 600 MG 12 hr tablet Take 1 tablet by mouth 2 (Two) Times a Day. Indications: Cough   Taking    ipratropium-albuterol (DUO-NEB) 0.5-2.5 mg/3 ml  nebulizer Take 3 mL by nebulization Daily. Indications: Asthma   Taking    Multiple Vitamins-Minerals (PRESERVISION AREDS PO) Take 1 tablet by mouth 2 (Two) Times a Day. Indications: Vitamin and/or Mineral Deficiency   Taking    potassium chloride (KLOR-CON M20) 20 MEQ CR tablet Take 1 tablet by mouth Daily. Indications: Low Amount of Potassium in the Blood 90 tablet 3 Taking    predniSONE (DELTASONE) 10 MG tablet Take 1 tablet by mouth Daily.   Taking    sertraline (ZOLOFT) 25 MG tablet Take 1 tablet by mouth Daily.   Taking    sodium chloride 7 % nebulizer solution nebulizer solution Take 4 mL by nebulization 2 (Two) Times a Day. 240 mL 0 Taking    O2 (OXYGEN) Inhale 2 L/min Every Night. Indications: Oxygen Therapy            Current Facility-Administered Medications:     acetaminophen (TYLENOL) tablet 650 mg, 650 mg, Oral, Q4H PRN **OR** acetaminophen (TYLENOL) 160 MG/5ML oral solution 650 mg, 650 mg, Oral, Q4H PRN **OR** acetaminophen (TYLENOL) suppository 650 mg, 650 mg, Rectal, Q4H PRN, Carlene Mooney MD    albuterol (PROVENTIL) nebulizer solution 0.083% 2.5 mg/3mL, 2.5 mg, Nebulization, Q6H PRN, Carlene Mooney MD    benzonatate (TESSALON) capsule 200 mg, 200 mg, Oral, TID PRN, Carlene Mooney MD    sennosides-docusate (PERICOLACE) 8.6-50 MG per tablet 2 tablet, 2 tablet, Oral, BID PRN **AND** polyethylene glycol (MIRALAX) packet 17 g, 17 g, Oral, Daily PRN **AND** bisacodyl (DULCOLAX) EC tablet 5 mg, 5 mg, Oral, Daily PRN **AND** bisacodyl (DULCOLAX) suppository 10 mg, 10 mg, Rectal, Daily PRN, Carlene Mooney MD    budesonide-formoterol (SYMBICORT) 160-4.5 MCG/ACT inhaler 1 puff, 1 puff, Inhalation, BID - RT, Carlene Mooney MD, 1 puff at 12/17/24 0657    bumetanide (BUMEX) tablet 2 mg, 2 mg, Oral, Daily, Carlene Mooney MD, 2 mg at 12/17/24 0901    carvedilol (COREG) tablet 3.125 mg, 3.125 mg, Oral, BID With Meals, Carlene Mooney MD, 3.125 mg at 12/17/24  0901    cefTRIAXone (ROCEPHIN) 1,000 mg in sodium chloride 0.9 % 100 mL MBP, 1,000 mg, Intravenous, Q24H, Carlene Mooney MD, Stopped at 12/17/24 0008    doxycycline (MONODOX) capsule 100 mg, 100 mg, Oral, Q12H, Carlene Mooney MD, 100 mg at 12/17/24 0901    guaiFENesin (MUCINEX) 12 hr tablet 600 mg, 600 mg, Oral, BID, Carlene Mooney MD, 600 mg at 12/17/24 0901    ipratropium-albuterol (DUO-NEB) nebulizer solution 3 mL, 3 mL, Nebulization, Q6H While Awake - RT, Carlene Mooney MD, 3 mL at 12/17/24 1215    melatonin tablet 2.5 mg, 2.5 mg, Oral, Nightly PRN, Carlene Mooney MD    melatonin tablet 2.5 mg, 2.5 mg, Oral, Nightly, Lon Henry MD    morphine injection 2 mg, 2 mg, Intravenous, Q4H PRN, Carlene Mooney MD, 2 mg at 12/17/24 1333    multivitamin with minerals 1 tablet, 1 tablet, Oral, BID, Carlene Mooney MD, 1 tablet at 12/17/24 0901    ondansetron ODT (ZOFRAN-ODT) disintegrating tablet 4 mg, 4 mg, Oral, Q6H PRN **OR** ondansetron (ZOFRAN) injection 4 mg, 4 mg, Intravenous, Q6H PRN, Carlene Mooney MD    [START ON 12/18/2024] predniSONE (DELTASONE) tablet 20 mg, 20 mg, Oral, Daily With Breakfast, oLn Henry MD    sertraline (ZOLOFT) tablet 25 mg, 25 mg, Oral, Daily, Carlene Mooney MD, 25 mg at 12/17/24 0901    sodium chloride 0.9 % flush 10 mL, 10 mL, Intravenous, PRN, Shruti Yanez APRN      Objective     Results Review:  LABS:  Results from last 7 days   Lab Units 12/17/24  0506 12/16/24  1241   WBC 10*3/mm3 9.70 11.47*   HEMOGLOBIN g/dL 11.3* 11.1*   HEMATOCRIT % 34.9 34.0   PLATELETS 10*3/mm3 263 297     Results from last 7 days   Lab Units 12/17/24  0506 12/16/24  1241   SODIUM mmol/L 142 141   POTASSIUM mmol/L 4.4 4.0   CHLORIDE mmol/L 104 105   CO2 mmol/L 27.5 28.0   BUN mg/dL 15 10   CREATININE mg/dL 0.97 0.94   CALCIUM mg/dL 9.4 9.5   BILIRUBIN mg/dL  --  0.4   ALK PHOS U/L  --  117   ALT (SGPT) U/L   --  22   AST (SGOT) U/L  --  27   GLUCOSE mg/dL 158* 145*         DIAGNOSTICS:    CTA CHEST ABDOMEN AND PELVIS 12/16/2024  IMPRESSION:     1. Airways disease with scattered multiple centrilobular and tree-in-bud nodules, similar to prior. Suspect infectious or inflammatory bronchiolitis. Chronic appearing right middle lobe and lingular  opacities, with volume loss and distortion, suspect scarring  2. Moderate to severe cardiomegaly, without edema.  3. Gallbladder full of gallstones.  4. Hepatic steatosis.  5. Colonic diverticulosis.  6. Multiple compression deformities in the thoracic and lumbar spine with progressive collapse seen at T7 and new biconcave compression deformity seen at T6 when compared to CTA chest November 18, 2024.      Results Review:   I reviewed the patient's new clinical results.  I personally viewed and interpreted the patient's CTA imaging of the chest/abdomen and pelvis osseous structures. The results were discussed with the patient as well as with Dr. Shane.    Vital Signs   Temp:  [97.3 °F (36.3 °C)-97.8 °F (36.6 °C)] 97.5 °F (36.4 °C)  Heart Rate:  [64-90] 84  Resp:  [18] 18  BP: (120-145)/(68-80) 140/71    Physical Exam:  Physical Exam  Vitals reviewed.   Constitutional:       General: She is not in acute distress.     Appearance: Normal appearance. She is well-developed and normal weight. She is not ill-appearing, toxic-appearing or diaphoretic.   HENT:      Head: Normocephalic and atraumatic.      Nose: Nose normal.      Mouth/Throat:      Mouth: Mucous membranes are moist.      Pharynx: Oropharynx is clear.   Eyes:      General:         Right eye: No discharge.         Left eye: No discharge.      Conjunctiva/sclera: Conjunctivae normal.   Neck:      Trachea: No tracheal deviation.   Cardiovascular:      Rate and Rhythm: Normal rate.   Pulmonary:      Effort: Pulmonary effort is normal. No respiratory distress.   Abdominal:      General: There is no distension.      Palpations: Abdomen  is soft.      Tenderness: There is no abdominal tenderness.   Musculoskeletal:         General: No tenderness. Normal range of motion.      Cervical back: Normal range of motion and neck supple. No tenderness.      Right lower leg: No edema.      Left lower leg: No edema.      Comments: Nontender to palpation of the thoracic or lumbar spine.   Skin:     General: Skin is warm and dry.      Findings: No erythema.   Neurological:      Mental Status: She is alert.      GCS: GCS eye subscore is 4. GCS verbal subscore is 5. GCS motor subscore is 6.      Sensory: No sensory deficit.      Motor: No weakness or abnormal muscle tone.      Coordination: Coordination normal.      Deep Tendon Reflexes: Reflexes are normal and symmetric. Reflexes normal.      Comments: This is a very pleasant, delightful 89-year-old female who is not no acute distress.  She has some baseline dementia.  She is awake, alert and oriented to person and place.    Speech is normal.  Converses appropriately.  PERRL. EOM's intact. Face symmetric. Tongue midline without fasiculations or atrophy. Sensation equal and intact throughout the face. Negative pronator drift.  No motor or sensory deficits. DTR's normal. Negative Osborne's; negative clonus. SLR negative bilaterally.  Gait not tested due to fall risk.   Psychiatric:         Mood and Affect: Mood normal.         Behavior: Behavior is cooperative.         Thought Content: Thought content normal.       Neurological Exam  Mental Status  Alert.    Reflexes  Deep tendon reflexes are 2+ and symmetric in all four extremities.      Assessment & Plan       Abdominal pain    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      Problem List Items Addressed This Visit          Unprioritized    Calculus of gallbladder without cholecystitis without obstruction     Other Visit Diagnoses       Acute abdominal pain    -  Primary    Acute on chronic respiratory failure with hypoxia        Abnormal  "CT of the abdomen        Compression deformity of vertebra                 COMORBID CONDITIONS:  Cardiomyopathy , COPD, Hypertension, and Osteoporosis    PLAN:     The patient has been evaluated by general surgery.  There are no plans for surgical intervention regarding the gallbladder given that there is no finding of cholecystitis.  The patient states that she has some chronic intermittent pain in her low back.  She denies any thoracic pain.  The new compression deformity at T6 and progression of compression at T7 although present on recent CT imaging of the chest abdomen and pelvis, is not causing the patient any discomfort.  She was offered MRI imaging to further evaluate however the patient declined.  She is aware that if her pain symptoms begin to worsen, neurosurgery will be happy to see her if needed.  Nothing further to add from neurosurgical standpoint.  Please call for any further questions or concerns.  I discussed the patient's findings and my recommendations with patient    During patient visit, I utilized appropriate personal protective equipment including gloves and mask.  Mask used was standard procedure mask. Appropriate PPE was worn during the entire visit.  Hand hygiene was completed before and after.     Hermelinda Rojo, APRN  12/17/24  17:59 EST    \"Dictated utilizing Dragon dictation\".    "

## 2024-12-17 NOTE — PLAN OF CARE
Goal Outcome Evaluation:  Plan of Care Reviewed With: patient           Outcome Evaluation: Patient presented to the ER with abdominal pain and hypoxia.  Patient has a history of dementia.  Patient was alert and oriented to person and place.  She was agreeable to therapy and followed commands well.  Patient demonstrated independent bed mobility.  She stood with standby assist and rolling walker.  She ambulated 180 feet with CGA to SBA with rolling walker.  Gait speed decreased as patient fatigued, but no overt gait impairments or loss of balance noted.  Patient was feeling a little short of breath after walking.  O2 was 88% on room air after walking.  Patient recovered quickly once back in bed.  Patient's mobility appears to be at baseline and she is safe to DC back home.  PT will sign off.  Recommend patient ambulate multiple times per day with nursing sta.  ff while admitted and continue to be up to bathroom with assist    Anticipated Discharge Disposition (PT): assisted living

## 2024-12-17 NOTE — H&P
HISTORY AND PHYSICAL   [facility]        Date of Admission: 2024  Patient Identification:  Name: Agnieszka Rizzo  Age: 89 y.o.  Sex: female  :  1930  MRN: [id number]                     Primary Care Physician: Matt Rose MD    Chief Complaint:  89 year old female comes to the ED with abdominal pain; she says it started yesterday; she has also had a cough; she was noted to be hypoxic in the ED; she has a history of dementia and is not able to give a detailed history; no visitors are with her; she has a known history of gallstones and was admitted for the same last month but got better overnight;     History of Present Illness:   As above    Past Medical History:  Past Medical History:   Diagnosis Date    Aspergillus     Asthma     Atrial fibrillation     chronic    Atrial flutter     Bronchiectasis     C. difficile diarrhea 2017    CAD (coronary artery disease)     nonobstructive    Chronic diastolic CHF (congestive heart failure)     Clinical diagnosis of COVID-19 2022    Colitis     Cough     Cryoglobulinemia     Dyspnea on exertion     Exposure to hepatitis A 2018    Fall     Hyperlipidemia     Hypertension     Hyponatremia     Hypoxia     Infectious viral hepatitis     AGE 13    Left shoulder pain     Leg swelling     Lesion of lung     Malignant hyperthermia due to anesthesia     Mild tricuspid regurgitation     MR (mitral regurgitation)     mild    MVP (mitral valve prolapse)     Permanent atrial fibrillation     Pneumonia of both lungs due to infectious organism 2017    Followed BY ID for MAC      Pneumonia with the fungal infection aspergillosis 2017    Pneumothorax     SOB (shortness of breath)     Syncope 2024    UTI (urinary tract infection)     Wheeze     mild     Past Surgical History:  Past Surgical History:   Procedure Laterality Date    BRONCHOSCOPY N/A 2016    Procedure: BRONCHOSCOPY WITH FLUORO, BRUSHINGS, BAL, AND BIOPSIES;   Surgeon: Rogelio Tucker MD;  Location: [facility] ENDOSCOPY;  Service:     BRONCHOSCOPY Bilateral 06/03/2017    Procedure: BRONCHOSCOPY with BAL ;  Surgeon: Sung King MD;  Location: [facility] ENDOSCOPY;  Service:     BRONCHOSCOPY N/A 12/17/2019    Procedure: BRONCHOSCOPY WITH WASHINGS;  Surgeon: Rogelio Tucker MD;  Location: [facility] ENDOSCOPY;  Service: Pulmonary    BRONCHOSCOPY N/A 07/15/2022    Procedure: BRONCHOSCOPY;  Surgeon: Rogelio Tucker MD;  Location: [facility] ENDOSCOPY;  Service: Pulmonary;  Laterality: N/A;  Pre: Pneumonia  Post: Pneumonia    BRONCHOSCOPY N/A 10/2/2023    Procedure: BRONCHOSCOPY WITH BRONCHIAL AVEOLAR LAVAGE;  Surgeon: Rogelio Tucker MD;  Location: [facility] ENDOSCOPY;  Service: Pulmonary;  Laterality: N/A;  PRE:BRONCHIECTASIS /   POST: SAME    CATARACT EXTRACTION EXTRACAPSULAR W/ INTRAOCULAR LENS IMPLANTATION      COLONOSCOPY      2013    D & C WITH SUCTION      HYSTERECTOMY      KNEE ARTHROSCOPY Left     Partial      Home Meds:  Medications Prior to Admission   Medication Sig Dispense Refill Last Dose/Taking    acetaminophen (TYLENOL) 325 MG tablet Take 2 tablets by mouth Every 4 (Four) Hours As Needed for Moderate Pain. Indications: Pain   Taking As Needed    albuterol sulfate  (90 Base) MCG/ACT inhaler Inhale 2 puffs Every 6 (Six) Hours As Needed for Wheezing or Shortness of Air. 8 g 11 Taking As Needed    azithromycin (ZITHROMAX) 250 MG tablet Take 1 tablet by mouth Daily.   Taking    benzonatate (TESSALON) 200 MG capsule Take 1 capsule by mouth 3 (Three) Times a Day As Needed for Cough. Indications: Cough   Taking As Needed    bumetanide (BUMEX) 2 MG tablet TAKE 1 TABLET TWICE A DAY FOR CARDIAC FAILURE, EDEMA (Patient taking differently: Take 1 tablet by mouth Daily.) 180 tablet 3 Taking Differently    carvedilol (COREG) 3.125 MG tablet Take 1 tablet by mouth 2 (Two) Times a Day With Meals. Indications: Cardiac Failure, High Blood Pressure Disorder 180 tablet 3 Taking    digoxin (LANOXIN) 125  MCG tablet Take 1 tablet by mouth Every Other Day. Indications: Atrial Fibrillation, Cardiac Failure (Patient taking differently: Take 1 tablet by mouth Every Other Day. Last dose 11/17  Indications: Atrial Fibrillation, Cardiac Failure)   Taking Differently    Eliquis 2.5 MG tablet tablet TAKE 1 TABLET EVERY 12 HOURS, FULL ANTICOAGULATION (Patient taking differently: Take 1 tablet by mouth Every 12 (Twelve) Hours.) 180 tablet 3 Taking Differently    FeroSul 325 (65 Fe) MG tablet TAKE ONE TABLET BY MOUTH DAILY WITH BREAKFAST (Patient taking differently: Take 1 tablet by mouth Daily.) 90 tablet 0 Taking Differently    fexofenadine (ALLEGRA) 180 MG tablet Take 1 tablet by mouth Daily. Indications: Hayfever   Taking    fluticasone-salmeterol (ADVAIR HFA) 115-21 MCG/ACT inhaler Inhale 2 puffs 2 (Two) Times a Day. Indications: Asthma   Taking    guaiFENesin (MUCINEX) 600 MG 12 hr tablet Take 1 tablet by mouth 2 (Two) Times a Day. Indications: Cough   Taking    ipratropium-albuterol (DUO-NEB) 0.5-2.5 mg/3 ml nebulizer Take 3 mL by nebulization Daily. Indications: Asthma   Taking    Multiple Vitamins-Minerals (PRESERVISION AREDS PO) Take 1 tablet by mouth 2 (Two) Times a Day. Indications: Vitamin and/or Mineral Deficiency   Taking    potassium chloride (KLOR-CON M20) 20 MEQ CR tablet Take 1 tablet by mouth Daily. Indications: Low Amount of Potassium in the Blood 90 tablet 3 Taking    predniSONE (DELTASONE) 10 MG tablet Take 1 tablet by mouth Daily.   Taking    sertraline (ZOLOFT) 25 MG tablet Take 1 tablet by mouth Daily.   Taking    sodium chloride 7 % nebulizer solution nebulizer solution Take 4 mL by nebulization 2 (Two) Times a Day. 240 mL 0 Taking    O2 (OXYGEN) Inhale 2 L/min Every Night. Indications: Oxygen Therapy          Allergies:  Allergies   Allergen Reactions    Amlodipine Besylate Swelling    Aspirin GI Intolerance     Caused bleeding ulcers    Bactrim [Sulfamethoxazole-Trimethoprim] Nausea And Vomiting     "Erythromycin Unknown (See Comments)     Patient does not recall type of reaction.    Levaquin [Levofloxacin] Unknown (See Comments)     Nausea      Nitrofurantoin Nausea Only and Nausea And Vomiting    Ramipril Other (See Comments)     Cough     Immunizations:  Immunization History   Administered Date(s) Administered    COVID-19 (PFIZER) Purple Cap Monovalent 2021, 2021, 2021    Flu Vaccine Intradermal Quad 18-64YR 10/01/2016    Fluad Quad 65+ 2020    Fluzone High-Dose 65+YRS 10/01/2016, 2017, 10/01/2018    Pneumococcal Conjugate 13-Valent (PCV13) 10/01/2016    Pneumococcal Polysaccharide (PPSV23) 2000    Shingrix 2018, 2018    Tdap 2021     Social History:   Social History     Social History Narrative    Not on file     Social History     Socioeconomic History    Marital status:    Tobacco Use    Smoking status: Never    Smokeless tobacco: Never    Tobacco comments:     caffiene daily   Vaping Use    Vaping status: Never Used   Substance and Sexual Activity    Alcohol use: Not Currently     Alcohol/week: 2.0 standard drinks of alcohol     Types: 1 Glasses of wine, 1 Shots of liquor per week    Drug use: No    Sexual activity: Defer     Partners: Male       Family History:  Family History   Problem Relation Age of Onset    Hypertension Mother     Stroke Mother     Heart disease Father     Hypertension Father     Cancer Brother     Cancer Son         Review of Systems  Not obtainable from the patient    Objective:  T Max 24 hrs: Temp (24hrs), Av.5 °F (36.4 °C), Min:97.3 °F (36.3 °C), Max:97.7 °F (36.5 °C)    Vitals Ranges:   Temp:  [97.3 °F (36.3 °C)-97.7 °F (36.5 °C)] 97.7 °F (36.5 °C)  Heart Rate:  [72-97] 90  Resp:  [18] 18  BP: (125-146)/(68-99) 146/78      Exam:  /78 (BP Location: Right arm, Patient Position: Lying)   Pulse 90   Temp 97.7 °F (36.5 °C) (Oral)   Resp 18   Ht 160 cm (63\")   Wt 55.8 kg (123 lb 0.3 oz)   SpO2 100%   BMI " 21.79 kg/m²     General Appearance:    Alert, cooperative, no distress, appears stated age   Head:    Normocephalic, without obvious abnormality, atraumatic; bitemporal wasting   Eyes:    PERRL, conjunctivae/corneas clear, EOM's intact, both eyes   Ears:    Normal external ear canals, both ears   Nose:   Nares normal, septum midline, mucosa normal, no drainage    or sinus tenderness   Throat:   Lips, mucosa, and tongue normal   Neck:   Supple, symmetrical, trachea midline, no adenopathy;     thyroid:  no enlargement/tenderness/nodules; no carotid    bruit or JVD   Back:     Symmetric, no curvature, ROM normal, no CVA tenderness   Lungs:     Decreased breath sounds bilaterally, respirations unlabored   Chest Wall:    No tenderness or deformity    Heart:    Regular rate and rhythm, S1 and S2 normal, no murmur, rub   or gallop   Abdomen:     Soft, nontender, bowel sounds active all four quadrants,     no masses, no hepatomegaly, no splenomegaly   Extremities:   Extremities normal, atraumatic, no cyanosis or edema                       .    Data Review:  Labs in chart were reviewed.  WBC   Date Value Ref Range Status   12/16/2024 11.47 (H) 3.40 - 10.80 10*3/mm3 Final     Hemoglobin   Date Value Ref Range Status   12/16/2024 11.1 (L) 12.0 - 15.9 g/dL Final     Hematocrit   Date Value Ref Range Status   12/16/2024 34.0 34.0 - 46.6 % Final     Platelets   Date Value Ref Range Status   12/16/2024 297 140 - 450 10*3/mm3 Final     Sodium   Date Value Ref Range Status   12/16/2024 141 136 - 145 mmol/L Final     Potassium   Date Value Ref Range Status   12/16/2024 4.0 3.5 - 5.2 mmol/L Final     Comment:     Slight hemolysis detected by analyzer. Result may be falsely elevated.     Chloride   Date Value Ref Range Status   12/16/2024 105 98 - 107 mmol/L Final     CO2   Date Value Ref Range Status   12/16/2024 28.0 22.0 - 29.0 mmol/L Final     BUN   Date Value Ref Range Status   12/16/2024 10 8 - 23 mg/dL Final     Creatinine    Date Value Ref Range Status   12/16/2024 0.94 0.57 - 1.00 mg/dL Final     Glucose   Date Value Ref Range Status   12/16/2024 145 (H) 65 - 99 mg/dL Final     Calcium   Date Value Ref Range Status   12/16/2024 9.5 8.2 - 9.6 mg/dL Final                Imaging Results (All)       Procedure Component Value Units Date/Time    CT Abdomen Pelvis With Contrast [[id number]] Collected: 12/16/24 1433     Updated: 12/16/24 1501    Narrative:      CT CHEST W CONTRAST DIAGNOSTIC-, CT ABDOMEN PELVIS W CONTRAST-     INDICATION: Rule out pneumonia. Shortness of air. Abdominal pain.     COMPARISON: CTA chest November 18, 2024 and CT abdomen pelvis November 18, 2024     TECHNIQUE:  Routine CT chest, abdomen and pelvis with IV contrast. Coronal and  sagittal reformats. Radiation dose reduction techniques were utilized,  including automated exposure control and exposure modulation based on  body size.     FINDINGS:      Chest wall: Ill-defined soft tissue seen adjacent to the inferior  scapula and chest wall, suspect fibroblastoma dorsi. No lymphadenopathy.     Mediastinum: Coronary artery atherosclerotic calcifications. Moderate to  severe cardiomegaly. No pericardial effusion. Calcified left hilar lymph  nodes, consistent with prior granulomatous infection. Right infrahilar  lymph node, series 2, axial mage 47, measures 1.4 cm, unchanged. No new  or enlarging lymph nodes identified.     Lungs/pleura: Trace bilateral pleural fluid. Occlusion of the superior  segmental bronchus left lower lobe, with adjacent calcified hilar lymph  nodes, unchanged. Airways wall thickening. Biapical pleural-parenchymal  thickening. Mild mosaic attenuation, suspect air trapping. Linear  opacities in the lower lobes, suspect subsegmental atelectasis or  scarring. Calcified pulmonary nodule in the superior segment left lower  lobe, consistent with prior granulomatous infection. Multifocal  centrilobular and tree-in-bud nodules, similar to prior. For  example,  centrilobular and tree-in-bud nodules in the apical upper lobes on  series 3, axial mage 23. Inferior right upper lobe peripheral opacity,  with volume loss and distortion. Bronchovascular right middle lobe  opacities, with volume loss and distortion. Small lingular opacity, with  volume loss and distortion. Suspect subsegmental airway impaction in the  anterior segment left upper lobe.     ABDOMEN: Hepatic steatosis. Calcifications in the liver and spleen,  consistent with prior granulomatous infection. Multiple fluid  attenuating hepatic cysts. Low attenuating liver lesions measuring less  than 1 cm too small to characterize, unchanged. Gallbladder full of  gallstones. No gallbladder wall thickening or surrounding inflammation.  No biliary ductal dilatation. Spleen is normal in size. No pancreatic  mass or pancreatic ductal dilatation seen. No adrenal nodules. Large  fluid attenuating cyst in the left mid kidney, measures 6.7 cm. No  solid-appearing renal mass or hydronephrosis.     Pelvis: Underdistended bladder. No bladder calculus. Hysterectomy. Left  ovarian cyst, series 2, axial mage 162, measures 2.4 cm, unchanged,  suspect benign.     Bowel: No obstruction. Colonic diverticulosis. Left colon and rectum are  collapsed. Appendix not identified though no secondary findings of  appendicitis.     Abdominal wall: Rectus diastases. Tiny fat-containing umbilical hernia.  Pelvic wall scarring.     Retroperitoneum: No lymphadenopathy.     Vasculature: Patent. Ectasia of the infrarenal abdominal aorta measuring  2.0 x 2.0 cm, unchanged. Severe aortoiliac atherosclerotic  calcification.     Osseous structures: Mild bilateral hip osteoarthritis. Lumbar facet  degenerative arthropathy with grade 1 anterolisthesis of L3 on L4. Old  biconcave compression deformity at L4, stable. Old biconcave compression  deformities seen at T11 and T12, stable. Mild superior endplate  compression deformity at L1, stable.  Superior plate compression  deformity at T7, with 50% collapse, previously 40% collapse, with  subchondral sclerosis beneath the superior endplate. Small biconcave  compression deformity seen at T6, with 20% collapse, new in the  interval.       Impression:         1. Airways disease with scattered multiple centrilobular and tree-in-bud  nodules, similar to prior. Suspect infectious or inflammatory  bronchiolitis. Chronic appearing right middle lobe and lingular  opacities, with volume loss and distortion, suspect scarring  2. Moderate to severe cardiomegaly, without edema.  3. Gallbladder full of gallstones.  4. Hepatic steatosis.  5. Colonic diverticulosis.  6. Multiple compression deformities in the thoracic and lumbar spine  with progressive collapse seen at T7 and new biconcave compression  deformity seen at T6 when compared to CTA chest November 18, 2024.     This report was finalized on 12/16/2024 2:58 PM by Dr. Tres Lopez M.D on Workstation: [id number]       CT Chest With Contrast Diagnostic [[id number]] Collected: 12/16/24 1433     Updated: 12/16/24 1501    Narrative:      CT CHEST W CONTRAST DIAGNOSTIC-, CT ABDOMEN PELVIS W CONTRAST-     INDICATION: Rule out pneumonia. Shortness of air. Abdominal pain.     COMPARISON: CTA chest November 18, 2024 and CT abdomen pelvis November 18, 2024     TECHNIQUE:  Routine CT chest, abdomen and pelvis with IV contrast. Coronal and  sagittal reformats. Radiation dose reduction techniques were utilized,  including automated exposure control and exposure modulation based on  body size.     FINDINGS:      Chest wall: Ill-defined soft tissue seen adjacent to the inferior  scapula and chest wall, suspect fibroblastoma dorsi. No lymphadenopathy.     Mediastinum: Coronary artery atherosclerotic calcifications. Moderate to  severe cardiomegaly. No pericardial effusion. Calcified left hilar lymph  nodes, consistent with prior granulomatous infection. Right  infrahilar  lymph node, series 2, axial mage 47, measures 1.4 cm, unchanged. No new  or enlarging lymph nodes identified.     Lungs/pleura: Trace bilateral pleural fluid. Occlusion of the superior  segmental bronchus left lower lobe, with adjacent calcified hilar lymph  nodes, unchanged. Airways wall thickening. Biapical pleural-parenchymal  thickening. Mild mosaic attenuation, suspect air trapping. Linear  opacities in the lower lobes, suspect subsegmental atelectasis or  scarring. Calcified pulmonary nodule in the superior segment left lower  lobe, consistent with prior granulomatous infection. Multifocal  centrilobular and tree-in-bud nodules, similar to prior. For example,  centrilobular and tree-in-bud nodules in the apical upper lobes on  series 3, axial mage 23. Inferior right upper lobe peripheral opacity,  with volume loss and distortion. Bronchovascular right middle lobe  opacities, with volume loss and distortion. Small lingular opacity, with  volume loss and distortion. Suspect subsegmental airway impaction in the  anterior segment left upper lobe.     ABDOMEN: Hepatic steatosis. Calcifications in the liver and spleen,  consistent with prior granulomatous infection. Multiple fluid  attenuating hepatic cysts. Low attenuating liver lesions measuring less  than 1 cm too small to characterize, unchanged. Gallbladder full of  gallstones. No gallbladder wall thickening or surrounding inflammation.  No biliary ductal dilatation. Spleen is normal in size. No pancreatic  mass or pancreatic ductal dilatation seen. No adrenal nodules. Large  fluid attenuating cyst in the left mid kidney, measures 6.7 cm. No  solid-appearing renal mass or hydronephrosis.     Pelvis: Underdistended bladder. No bladder calculus. Hysterectomy. Left  ovarian cyst, series 2, axial mage 162, measures 2.4 cm, unchanged,  suspect benign.     Bowel: No obstruction. Colonic diverticulosis. Left colon and rectum are  collapsed. Appendix not  identified though no secondary findings of  appendicitis.     Abdominal wall: Rectus diastases. Tiny fat-containing umbilical hernia.  Pelvic wall scarring.     Retroperitoneum: No lymphadenopathy.     Vasculature: Patent. Ectasia of the infrarenal abdominal aorta measuring  2.0 x 2.0 cm, unchanged. Severe aortoiliac atherosclerotic  calcification.     Osseous structures: Mild bilateral hip osteoarthritis. Lumbar facet  degenerative arthropathy with grade 1 anterolisthesis of L3 on L4. Old  biconcave compression deformity at L4, stable. Old biconcave compression  deformities seen at T11 and T12, stable. Mild superior endplate  compression deformity at L1, stable. Superior plate compression  deformity at T7, with 50% collapse, previously 40% collapse, with  subchondral sclerosis beneath the superior endplate. Small biconcave  compression deformity seen at T6, with 20% collapse, new in the  interval.       Impression:         1. Airways disease with scattered multiple centrilobular and tree-in-bud  nodules, similar to prior. Suspect infectious or inflammatory  bronchiolitis. Chronic appearing right middle lobe and lingular  opacities, with volume loss and distortion, suspect scarring  2. Moderate to severe cardiomegaly, without edema.  3. Gallbladder full of gallstones.  4. Hepatic steatosis.  5. Colonic diverticulosis.  6. Multiple compression deformities in the thoracic and lumbar spine  with progressive collapse seen at T7 and new biconcave compression  deformity seen at T6 when compared to CTA chest November 18, 2024.     This report was finalized on 12/16/2024 2:58 PM by Dr. Tres Lopez M.D on Workstation: [id number]       XR Chest 1 View [[id number]] Collected: 12/16/24 1301     Updated: 12/16/24 1305    Narrative:      XR CHEST 1 VW-     HISTORY: Female who is 89 years-old, hypoxia     TECHNIQUE: Frontal views of the chest     COMPARISON: 8/14/2024     FINDINGS: The heart is enlarged. Aorta is tortuous,  calcified. Pulmonary  vasculature is mildly congested. Linear likely scarring or atelectasis  at the right midlung. Small right basilar atelectasis or infiltrate,  there may be minimal right pleural effusion. No pneumothorax. No acute  osseous process.         Impression:      As described.     This report was finalized on 12/16/2024 1:02 PM by Dr. Steven Garza M.D on Workstation: [id number]                 Assessment:  Active Hospital Problems    Diagnosis  POA    Abdominal pain [R10.9]  Yes      Resolved Hospital Problems   No resolved problems to display.   A fib  Dementia  Cad  Hypertension  Gallstones  Underweight  Acute hypoxic respiratory failure  Chronic diastolic chf  Bronchiolitis  bronchiectasis    Plan:  Will ask surgery to see her  Hold eliquis for now in case she needs a procedure  Add antibiotics for bronchiolitis  Steroids, mininebs  Monitor on telemetry  Trend labs  Dw patient and ed provider    Carlene Mooney MD  12/16/2024  20:26 EST

## 2024-12-18 LAB
ANION GAP SERPL CALCULATED.3IONS-SCNC: 12.6 MMOL/L (ref 5–15)
BUN SERPL-MCNC: 23 MG/DL (ref 8–23)
BUN/CREAT SERPL: 29.5 (ref 7–25)
CALCIUM SPEC-SCNC: 9.3 MG/DL (ref 8.2–9.6)
CHLORIDE SERPL-SCNC: 102 MMOL/L (ref 98–107)
CO2 SERPL-SCNC: 27.4 MMOL/L (ref 22–29)
CREAT SERPL-MCNC: 0.78 MG/DL (ref 0.57–1)
DEPRECATED RDW RBC AUTO: 50.1 FL (ref 37–54)
EGFRCR SERPLBLD CKD-EPI 2021: 70.5 ML/MIN/1.73
ERYTHROCYTE [DISTWIDTH] IN BLOOD BY AUTOMATED COUNT: 13.6 % (ref 12.3–15.4)
GLUCOSE SERPL-MCNC: 107 MG/DL (ref 65–99)
HCT VFR BLD AUTO: 38.7 % (ref 34–46.6)
HGB BLD-MCNC: 12.4 G/DL (ref 12–15.9)
MCH RBC QN AUTO: 32.3 PG (ref 26.6–33)
MCHC RBC AUTO-ENTMCNC: 32 G/DL (ref 31.5–35.7)
MCV RBC AUTO: 100.8 FL (ref 79–97)
PLATELET # BLD AUTO: 261 10*3/MM3 (ref 140–450)
PMV BLD AUTO: 10 FL (ref 6–12)
POTASSIUM SERPL-SCNC: 3.7 MMOL/L (ref 3.5–5.2)
RBC # BLD AUTO: 3.84 10*6/MM3 (ref 3.77–5.28)
SODIUM SERPL-SCNC: 142 MMOL/L (ref 136–145)
WBC NRBC COR # BLD AUTO: 15.6 10*3/MM3 (ref 3.4–10.8)

## 2024-12-18 PROCEDURE — 94799 UNLISTED PULMONARY SVC/PX: CPT

## 2024-12-18 PROCEDURE — 80048 BASIC METABOLIC PNL TOTAL CA: CPT | Performed by: INTERNAL MEDICINE

## 2024-12-18 PROCEDURE — 25010000002 MORPHINE PER 10 MG: Performed by: INTERNAL MEDICINE

## 2024-12-18 PROCEDURE — 63710000001 PREDNISONE PER 1 MG: Performed by: INTERNAL MEDICINE

## 2024-12-18 PROCEDURE — 94761 N-INVAS EAR/PLS OXIMETRY MLT: CPT

## 2024-12-18 PROCEDURE — 85027 COMPLETE CBC AUTOMATED: CPT | Performed by: INTERNAL MEDICINE

## 2024-12-18 PROCEDURE — 94664 DEMO&/EVAL PT USE INHALER: CPT

## 2024-12-18 PROCEDURE — 25010000002 CEFTRIAXONE PER 250 MG: Performed by: INTERNAL MEDICINE

## 2024-12-18 RX ORDER — ACETAMINOPHEN 325 MG/1
650 TABLET ORAL 2 TIMES DAILY
Status: DISCONTINUED | OUTPATIENT
Start: 2024-12-18 | End: 2024-12-21 | Stop reason: HOSPADM

## 2024-12-18 RX ORDER — PREDNISONE 10 MG/1
10 TABLET ORAL
Status: DISCONTINUED | OUTPATIENT
Start: 2024-12-19 | End: 2024-12-21 | Stop reason: HOSPADM

## 2024-12-18 RX ORDER — HYDROCODONE BITARTRATE AND ACETAMINOPHEN 5; 325 MG/1; MG/1
0.5 TABLET ORAL EVERY 4 HOURS PRN
Status: DISCONTINUED | OUTPATIENT
Start: 2024-12-18 | End: 2024-12-21 | Stop reason: HOSPADM

## 2024-12-18 RX ADMIN — BUDESONIDE AND FORMOTEROL FUMARATE DIHYDRATE 1 PUFF: 160; 4.5 AEROSOL RESPIRATORY (INHALATION) at 06:50

## 2024-12-18 RX ADMIN — Medication 2.5 MG: at 22:00

## 2024-12-18 RX ADMIN — MORPHINE SULFATE 2 MG: 2 INJECTION, SOLUTION INTRAMUSCULAR; INTRAVENOUS at 11:27

## 2024-12-18 RX ADMIN — CEFTRIAXONE SODIUM 1000 MG: 1 INJECTION, POWDER, FOR SOLUTION INTRAMUSCULAR; INTRAVENOUS at 21:59

## 2024-12-18 RX ADMIN — IPRATROPIUM BROMIDE AND ALBUTEROL SULFATE 3 ML: 2.5; .5 SOLUTION RESPIRATORY (INHALATION) at 06:49

## 2024-12-18 RX ADMIN — SERTRALINE HYDROCHLORIDE 25 MG: 25 TABLET ORAL at 08:44

## 2024-12-18 RX ADMIN — CARVEDILOL 3.12 MG: 3.12 TABLET, FILM COATED ORAL at 09:34

## 2024-12-18 RX ADMIN — PREDNISONE 20 MG: 20 TABLET ORAL at 08:44

## 2024-12-18 RX ADMIN — ACETAMINOPHEN 650 MG: 325 TABLET ORAL at 12:27

## 2024-12-18 RX ADMIN — GUAIFENESIN 600 MG: 600 TABLET, MULTILAYER, EXTENDED RELEASE ORAL at 21:59

## 2024-12-18 RX ADMIN — IPRATROPIUM BROMIDE AND ALBUTEROL SULFATE 3 ML: 2.5; .5 SOLUTION RESPIRATORY (INHALATION) at 10:56

## 2024-12-18 RX ADMIN — IPRATROPIUM BROMIDE AND ALBUTEROL SULFATE 3 ML: 2.5; .5 SOLUTION RESPIRATORY (INHALATION) at 17:02

## 2024-12-18 RX ADMIN — Medication 1 TABLET: at 21:59

## 2024-12-18 RX ADMIN — DOXYCYCLINE 100 MG: 100 CAPSULE ORAL at 21:59

## 2024-12-18 RX ADMIN — GUAIFENESIN 600 MG: 600 TABLET, MULTILAYER, EXTENDED RELEASE ORAL at 08:44

## 2024-12-18 RX ADMIN — DOXYCYCLINE 100 MG: 100 CAPSULE ORAL at 08:44

## 2024-12-18 RX ADMIN — ACETAMINOPHEN 650 MG: 325 TABLET ORAL at 21:59

## 2024-12-18 RX ADMIN — BUMETANIDE 2 MG: 2 TABLET ORAL at 08:44

## 2024-12-18 RX ADMIN — CARVEDILOL 3.12 MG: 3.12 TABLET, FILM COATED ORAL at 17:43

## 2024-12-18 RX ADMIN — Medication 1 TABLET: at 08:44

## 2024-12-18 NOTE — DISCHARGE PLACEMENT REQUEST
"Svetlana Rizzo (89 y.o. Female)       Date of Birth   08/07/1930    Social Security Number       Address   [address] [place] [address]    Home Phone   [phone]    MRN   [id number]       Zoroastrian   Taoism    Marital Status                               Admission Date   12/16/24    Admission Type   Emergency    Admitting Provider   Carlene Mooney MD    Attending Provider   Lon Henry MD    Department, Room/Bed   [address], [address]/1       Discharge Date       Discharge Disposition       Discharge Destination                                 Attending Provider: Lon Henry MD    Allergies: Amlodipine Besylate, Aspirin, Bactrim [Sulfamethoxazole-trimethoprim], Erythromycin, Levaquin [Levofloxacin], Nitrofurantoin, Ramipril    Isolation: None   Infection: None   Code Status: No CPR    Ht: 160 cm (63\")   Wt: 56.4 kg (124 lb 5.4 oz)    Admission Cmt: None   Principal Problem: 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 [M80.88XA]                   Active Insurance as of 12/16/2024       Primary Coverage       Payor Plan Insurance Group Employer/Plan Group    MEDICARE MEDICARE A & B        Payor Plan Address Payor Plan Phone Number Payor Plan Fax Number Effective Dates    [address] [phone]  8/1/1995 - None Entered    [facility] [id number]         Subscriber Name Subscriber Birth Date Member ID       SVETLANA RIZZO 8/7/1930 [id number]               Secondary Coverage       Payor Plan Insurance Group Employer/Plan Group    [facility] HEALTH CARE OPTIONS PLAN F       Payor Plan Address Payor Plan Phone Number Payor Plan Fax Number Effective Dates    [facility] [phone]  7/1/2014 - None Entered    [address]       [facility] [id number]         Subscriber Name Subscriber Birth Date Member ID       SVETLANA RIZZO 8/7/1930 [id number]                     Emergency Contacts        " (Rel.) Home Phone Work Phone Mobile Phone    KayleyMichelle prado (Daughter) [phone] -- [phone]    Handy Rizzo (Son) [phone] -- --    Isaias Preciado (son gina juan) (Relative) [phone] -- --

## 2024-12-18 NOTE — PLAN OF CARE
Goal Outcome Evaluation:      Discussed skin care and bed sore prevention.  Also discussed fall prevention.  Pain management also discussed.

## 2024-12-18 NOTE — PROGRESS NOTES
[facility] Medicine Services  PROGRESS NOTE    Patient Name: Agnieszka Rizzo  : 1930  MRN: [id number]    Date of Admission: 2024  Primary Care Physician: Matt Rose MD    Subjective   Subjective     CC:  Follow-up abdominal pain and back pain    Subjective: Patient complaining of more back pain today.  She is denying any abdominal pain and tolerating her breakfast without issue.  She is still feeling weak.  Nursing found her at 88% on room air this morning and placed her on 2 L nasal cannula.      No current fevers or chills  No current nausea, vomiting, or diarrhea  No current chest pain or palpitations      Objective   Objective     Vital Signs:   Temp:  [97.5 °F (36.4 °C)-97.7 °F (36.5 °C)] 97.7 °F (36.5 °C)  Heart Rate:  [55-87] 82  Resp:  [16-18] 18  BP: (102-143)/(62-71) 143/71        Physical Exam:  Constitutional: Awake, alert, elderly appearing  HENT: NCAT, mucous membranes moist, neck supple  Respiratory: Occasional cough is present, congestion, breathing nonlabored  Cardiovascular: Pulse rate is normal, palpable radial pulses  Gastrointestinal:  Soft, nontender, nondistended  Musculoskeletal: Frail and chronically debilitated appearance, BMI is 21  Psychiatric: Appropriate affect, cooperative, conversational  Neurologic: Poor historian, no slurred speech or facial droop, follows commands  Skin: No rashes or jaundice, warm      Results Reviewed:  Results from last 7 days   Lab Units 24  0517 24  0506 24  1241   WBC 10*3/mm3 15.60* 9.70 11.47*   HEMOGLOBIN g/dL 12.4 11.3* 11.1*   HEMATOCRIT % 38.7 34.9 34.0   PLATELETS 10*3/mm3 261 263 297     Results from last 7 days   Lab Units 24  0517 24  0506 24  1434 24  1241   SODIUM mmol/L 142 142  --  141   POTASSIUM mmol/L 3.7 4.4  --  4.0   CHLORIDE mmol/L 102 104  --  105   CO2 mmol/L 27.4 27.5  --  28.0   BUN mg/dL 23 15  --  10   CREATININE mg/dL 0.78 0.97  --  0.94   GLUCOSE mg/dL 107* 158*  --   145*   CALCIUM mg/dL 9.3 9.4  --  9.5   ALK PHOS U/L  --   --   --  117   ALT (SGPT) U/L  --   --   --  22   AST (SGOT) U/L  --   --   --  27   HSTROP T ng/L  --   --  31* 37*   PROBNP pg/mL  --   --   --  5,193.0*     Estimated Creatinine Clearance: 39.3 mL/min (by C-G formula based on SCr of 0.78 mg/dL).    Microbiology Results Abnormal       None            Imaging Results (Last 24 Hours)       ** No results found for the last 24 hours. **            Results for orders placed during the hospital encounter of 10/04/23    Adult Transthoracic Echo Complete W/ Cont if Necessary Per Protocol    Interpretation Summary    Left ventricular systolic function is normal. Calculated left ventricular EF = 56%    Normal right ventricular cavity size and systolic function noted.    The left atrial cavity is severely dilated.    The right atrial cavity is severely dilated.    Mild to moderate mitral valve regurgitation is present.    Moderate tricuspid valve regurgitation is present.    Calculated right ventricular systolic pressure from tricuspid regurgitation is 49 mmHg.    There is a small (<1cm) circumferential pericardial effusion. There is no evidence of cardiac tamponade.      I have reviewed the medications:  Scheduled Meds:budesonide-formoterol, 1 puff, Inhalation, BID - RT  bumetanide, 2 mg, Oral, Daily  carvedilol, 3.125 mg, Oral, BID With Meals  cefTRIAXone, 1,000 mg, Intravenous, Q24H  doxycycline, 100 mg, Oral, Q12H  guaiFENesin, 600 mg, Oral, BID  ipratropium-albuterol, 3 mL, Nebulization, Q6H While Awake - RT  melatonin, 2.5 mg, Oral, Nightly  multivitamin with minerals, 1 tablet, Oral, BID  [START ON 12/19/2024] predniSONE, 10 mg, Oral, Daily With Breakfast  sertraline, 25 mg, Oral, Daily      Continuous Infusions:   PRN Meds:.  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        Brief Hospital Course to date:  Agnieszka Rizzo is a 89 y.o. female presents the hospital with abdominal pain and cough and was found to have infectious findings and CT chest concerning for possible pneumonia or bronchiectasis exacerbation and many gallstones.    Discussion/plan for today:   Patient having some hypoxia this morning.  Plan for supplemental oxygen and titrate as needed.  Flutter valve added this morning to promote good pulmonary hygiene.  Decrease prednisone back to home 10 mg dose tomorrow as wheezes have resolved and overall seems to be much better.  Leukocytosis likely steroid related.  Add scheduled Tylenol for mild back pain from compression fractures.  Patient is not interested in MRI to evaluate further.  Continue to monitor in the hospital, need to continue to monitor her hypoxia and her white count response to decreasing the steroids...     Possible pneumonia, bronchiectasis exacerbation, bronchiolitis:  CT noted with infiltrates and tree-in-bud findings concerning for pneumonia or bronchiectasis exacerbation.  Currently improving clinically with doxycycline and ceftriaxone.  Leukocytosis resolved but then rebounded following steroids..  Flutter valve added.  Some mild intermittent hypoxia.  Titrate supplemental oxygen as needed.    new compression deformity at T6 and progression of compression at T7 although present on recent CT: Patient complaining of intermittent back pain.  Add twice daily scheduled Tylenol.  Patient was offered an MRI to further evaluate but declined MRI.  Supportive care and symptom treatment.    Abdominal pain and  gallstones: Resolved, etiology unclear, surgery consult team evaluated and does not feel patient has acute cholecystitis and will follow her up in clinic, tolerating diet without issues    Dementia: Supportive care and symptom treatment.  Noted.  Add low-dose melatonin to promote sleep.    Hypertension: Monitor blood pressure and adjust as needed.    Acute hypoxic respiratory failure: Resolved.    Chronic diastolic CHF: Compensated.  Continue appropriate cardiac medications and monitor volume status.  Previous echocardiogram reviewed    Physical debility: PT.    DVT Prophylaxis:   SCDs      Disposition: Return to [facility] when improved    CODE STATUS:   Code Status and Medical Interventions: No CPR (Do Not Attempt to Resuscitate); Limited Support; No intubation (DNI)   Ordered at: 12/16/24 1831     Medical Intervention Limits:    No intubation (DNI)     Code Status (Patient has no pulse and is not breathing):    No CPR (Do Not Attempt to Resuscitate)     Medical Interventions (Patient has pulse or is breathing):    Limited Support       Lon Henry MD  12/18/24

## 2024-12-18 NOTE — TELEPHONE ENCOUNTER
She is currently hospitalized.  I will defer until after her hospitalization to see if she goes home on the same medications.

## 2024-12-18 NOTE — PLAN OF CARE
Goal Outcome Evaluation:  Plan of Care Reviewed With: patient        Progress: improving  Outcome Evaluation: Patient is AOX4, Vital sign stable, Patient was hypoxia this morning so 2L Oxygen now, Patient C/O severe  back pain so gave IV morphine, and prescribed tylenol. No other signs of distress except back pain.Will monitor continue.

## 2024-12-19 ENCOUNTER — APPOINTMENT (OUTPATIENT)
Dept: MRI IMAGING | Facility: HOSPITAL | Age: 89
End: 2024-12-19
Payer: MEDICARE

## 2024-12-19 LAB
ANION GAP SERPL CALCULATED.3IONS-SCNC: 7.2 MMOL/L (ref 5–15)
BUN SERPL-MCNC: 24 MG/DL (ref 8–23)
BUN/CREAT SERPL: 27.6 (ref 7–25)
CALCIUM SPEC-SCNC: 9.3 MG/DL (ref 8.2–9.6)
CHLORIDE SERPL-SCNC: 104 MMOL/L (ref 98–107)
CO2 SERPL-SCNC: 28.8 MMOL/L (ref 22–29)
CREAT SERPL-MCNC: 0.87 MG/DL (ref 0.57–1)
DEPRECATED RDW RBC AUTO: 50.9 FL (ref 37–54)
EGFRCR SERPLBLD CKD-EPI 2021: 61.8 ML/MIN/1.73
ERYTHROCYTE [DISTWIDTH] IN BLOOD BY AUTOMATED COUNT: 13.8 % (ref 12.3–15.4)
GLUCOSE SERPL-MCNC: 93 MG/DL (ref 65–99)
HCT VFR BLD AUTO: 29.5 % (ref 34–46.6)
HGB BLD-MCNC: 9.6 G/DL (ref 12–15.9)
MCH RBC QN AUTO: 33 PG (ref 26.6–33)
MCHC RBC AUTO-ENTMCNC: 32.5 G/DL (ref 31.5–35.7)
MCV RBC AUTO: 101.4 FL (ref 79–97)
PLATELET # BLD AUTO: 294 10*3/MM3 (ref 140–450)
PMV BLD AUTO: 10.3 FL (ref 6–12)
POTASSIUM SERPL-SCNC: 3.5 MMOL/L (ref 3.5–5.2)
RBC # BLD AUTO: 2.91 10*6/MM3 (ref 3.77–5.28)
SODIUM SERPL-SCNC: 140 MMOL/L (ref 136–145)
WBC NRBC COR # BLD AUTO: 13.22 10*3/MM3 (ref 3.4–10.8)

## 2024-12-19 PROCEDURE — 85027 COMPLETE CBC AUTOMATED: CPT | Performed by: INTERNAL MEDICINE

## 2024-12-19 PROCEDURE — 72146 MRI CHEST SPINE W/O DYE: CPT

## 2024-12-19 PROCEDURE — 94799 UNLISTED PULMONARY SVC/PX: CPT

## 2024-12-19 PROCEDURE — 99231 SBSQ HOSP IP/OBS SF/LOW 25: CPT | Performed by: NURSE PRACTITIONER

## 2024-12-19 PROCEDURE — 94760 N-INVAS EAR/PLS OXIMETRY 1: CPT

## 2024-12-19 PROCEDURE — 72148 MRI LUMBAR SPINE W/O DYE: CPT

## 2024-12-19 PROCEDURE — 63710000001 PREDNISONE PER 5 MG: Performed by: INTERNAL MEDICINE

## 2024-12-19 PROCEDURE — 94761 N-INVAS EAR/PLS OXIMETRY MLT: CPT

## 2024-12-19 PROCEDURE — 94664 DEMO&/EVAL PT USE INHALER: CPT

## 2024-12-19 PROCEDURE — 80048 BASIC METABOLIC PNL TOTAL CA: CPT | Performed by: INTERNAL MEDICINE

## 2024-12-19 RX ADMIN — DOXYCYCLINE 100 MG: 100 CAPSULE ORAL at 20:55

## 2024-12-19 RX ADMIN — Medication 1 TABLET: at 09:02

## 2024-12-19 RX ADMIN — Medication 2.5 MG: at 20:54

## 2024-12-19 RX ADMIN — ACETAMINOPHEN 650 MG: 325 TABLET ORAL at 20:54

## 2024-12-19 RX ADMIN — BUDESONIDE AND FORMOTEROL FUMARATE DIHYDRATE 1 PUFF: 160; 4.5 AEROSOL RESPIRATORY (INHALATION) at 21:38

## 2024-12-19 RX ADMIN — BUMETANIDE 2 MG: 2 TABLET ORAL at 09:02

## 2024-12-19 RX ADMIN — SERTRALINE HYDROCHLORIDE 25 MG: 25 TABLET ORAL at 09:02

## 2024-12-19 RX ADMIN — PREDNISONE 10 MG: 10 TABLET ORAL at 09:02

## 2024-12-19 RX ADMIN — HYDROCODONE BITARTRATE AND ACETAMINOPHEN 0.5 TABLET: 5; 325 TABLET ORAL at 14:50

## 2024-12-19 RX ADMIN — DOXYCYCLINE 100 MG: 100 CAPSULE ORAL at 09:02

## 2024-12-19 RX ADMIN — IPRATROPIUM BROMIDE AND ALBUTEROL SULFATE 3 ML: 2.5; .5 SOLUTION RESPIRATORY (INHALATION) at 06:58

## 2024-12-19 RX ADMIN — IPRATROPIUM BROMIDE AND ALBUTEROL SULFATE 3 ML: 2.5; .5 SOLUTION RESPIRATORY (INHALATION) at 21:37

## 2024-12-19 RX ADMIN — ACETAMINOPHEN 650 MG: 325 TABLET ORAL at 09:02

## 2024-12-19 RX ADMIN — GUAIFENESIN 600 MG: 600 TABLET, MULTILAYER, EXTENDED RELEASE ORAL at 20:55

## 2024-12-19 RX ADMIN — Medication 1 TABLET: at 20:54

## 2024-12-19 RX ADMIN — GUAIFENESIN 600 MG: 600 TABLET, MULTILAYER, EXTENDED RELEASE ORAL at 09:02

## 2024-12-19 RX ADMIN — HYDROCODONE BITARTRATE AND ACETAMINOPHEN 0.5 TABLET: 5; 325 TABLET ORAL at 19:44

## 2024-12-19 RX ADMIN — BUDESONIDE AND FORMOTEROL FUMARATE DIHYDRATE 1 PUFF: 160; 4.5 AEROSOL RESPIRATORY (INHALATION) at 07:06

## 2024-12-19 RX ADMIN — CARVEDILOL 3.12 MG: 3.12 TABLET, FILM COATED ORAL at 17:47

## 2024-12-19 RX ADMIN — CARVEDILOL 3.12 MG: 3.12 TABLET, FILM COATED ORAL at 09:02

## 2024-12-19 NOTE — PROGRESS NOTES
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 Last BM: 12/15   Skin Status Bruised       Height  Weight  BMI  Weight Trend     Height: 160 cm (63\")  Weight: 59 kg (130 lb 1.1 oz) (12/19/24 0539)  Body mass index is 23.04 kg/m².  Stable       Nutrition Problem (PES) No nutrition diagnosis at this time.       Intervention/Plan - Pt not in the room during attempted visit. Spoke to family member in the room who reports that pt dislikes the hospital food but has good appetite at home. Likes boost per family.   - Will order boost to support PO intakes.   - Monitor PO intake    RD to follow up per protocol.     Results from last 7 days   Lab Units 12/19/24  0538 12/18/24  0517 12/17/24  0506 12/16/24  1241   SODIUM mmol/L 140 142 142 141   POTASSIUM mmol/L 3.5 3.7 4.4 4.0   CHLORIDE mmol/L 104 102 104 105   CO2 mmol/L 28.8 27.4 27.5 28.0   BUN mg/dL 24* 23 15 10   CREATININE mg/dL 0.87 0.78 0.97 0.94   CALCIUM mg/dL 9.3 9.3 9.4 9.5   BILIRUBIN mg/dL  --   --   --  0.4   ALK PHOS U/L  --   --   --  117   ALT (SGPT) U/L  --   --   --  22   AST (SGOT) U/L  --   --   --  27   GLUCOSE mg/dL 93 107* 158* 145*     Results from last 7 days   Lab Units 12/19/24  0538   HEMOGLOBIN g/dL 9.6*   HEMATOCRIT % 29.5*     Lab Results   Component Value Date    HGBA1C 6.00 (H) 08/16/2024         Electronically signed by:  Monse Clark RD  12/19/24 14:11 EST  "

## 2024-12-19 NOTE — PROGRESS NOTES
[facility] Medicine Services  PROGRESS NOTE    Patient Name: Agnieszka Rizzo  : 1930  MRN: [id number]    Date of Admission: 2024  Primary Care Physician: Matt Rose MD    Subjective   Subjective     CC:  Follow-up abdominal pain and back pain    Subjective: Sitting up at the side of the bed, needs to go use the bathroom.  86% on room air but improved with 2 L of O2.  She is not complaining of any significant back pain today and bends forward to show me that she is improved.        Objective   Objective     Vital Signs:   Temp:  [97.7 °F (36.5 °C)] 97.7 °F (36.5 °C)  Heart Rate:  [77-99] 77  Resp:  [18-20] 20  BP: (133-173)/(78-83) 173/83        Physical Exam:  Constitutional: Awake, alert, elderly appearing  HENT: NCAT, mucous membranes moist, neck supple  Respiratory: Occasional cough is present, congestion, breathing nonlabored  Cardiovascular: Pulse rate is normal, palpable radial pulses  Gastrointestinal:  Soft, nontender, nondistended  Musculoskeletal: Frail and chronically debilitated appearance, BMI is 21  Psychiatric: Appropriate affect, cooperative, conversational  Neurologic: Poor historian, no slurred speech or facial droop, follows commands  Skin: No rashes or jaundice, warm      Results Reviewed:  Results from last 7 days   Lab Units 24  0538 24  0517 24  0506   WBC 10*3/mm3 13.22* 15.60* 9.70   HEMOGLOBIN g/dL 9.6* 12.4 11.3*   HEMATOCRIT % 29.5* 38.7 34.9   PLATELETS 10*3/mm3 294 261 263     Results from last 7 days   Lab Units 24  0538 24  0517 24  0506 24  1434 24  1241   SODIUM mmol/L 140 142 142  --  141   POTASSIUM mmol/L 3.5 3.7 4.4  --  4.0   CHLORIDE mmol/L 104 102 104  --  105   CO2 mmol/L 28.8 27.4 27.5  --  28.0   BUN mg/dL 24* 23 15  --  10   CREATININE mg/dL 0.87 0.78 0.97  --  0.94   GLUCOSE mg/dL 93 107* 158*  --  145*   CALCIUM mg/dL 9.3 9.3 9.4  --  9.5   ALK PHOS U/L  --   --   --   --  117   ALT (SGPT) U/L  --    --   --   --  22   AST (SGOT) U/L  --   --   --   --  27   HSTROP T ng/L  --   --   --  31* 37*   PROBNP pg/mL  --   --   --   --  5,193.0*     Estimated Creatinine Clearance: 36.8 mL/min (by C-G formula based on SCr of 0.87 mg/dL).    Microbiology Results Abnormal       None            Imaging Results (Last 24 Hours)       Procedure Component Value Units Date/Time    MRI Thoracic Spine Without Contrast [[id number]] Resulted: 12/19/24 1403     Updated: 12/19/24 1358    MRI Lumbar Spine Without Contrast [[id number]] Resulted: 12/19/24 1314     Updated: 12/19/24 1358            Results for orders placed during the hospital encounter of 10/04/23    Adult Transthoracic Echo Complete W/ Cont if Necessary Per Protocol    Interpretation Summary    Left ventricular systolic function is normal. Calculated left ventricular EF = 56%    Normal right ventricular cavity size and systolic function noted.    The left atrial cavity is severely dilated.    The right atrial cavity is severely dilated.    Mild to moderate mitral valve regurgitation is present.    Moderate tricuspid valve regurgitation is present.    Calculated right ventricular systolic pressure from tricuspid regurgitation is 49 mmHg.    There is a small (<1cm) circumferential pericardial effusion. There is no evidence of cardiac tamponade.      I have reviewed the medications:  Scheduled Meds:acetaminophen, 650 mg, Oral, BID  budesonide-formoterol, 1 puff, Inhalation, BID - RT  bumetanide, 2 mg, Oral, Daily  carvedilol, 3.125 mg, Oral, BID With Meals  cefTRIAXone, 1,000 mg, Intravenous, Q24H  doxycycline, 100 mg, Oral, Q12H  guaiFENesin, 600 mg, Oral, BID  ipratropium-albuterol, 3 mL, Nebulization, Q6H While Awake - RT  melatonin, 2.5 mg, Oral, Nightly  multivitamin with minerals, 1 tablet, Oral, BID  predniSONE, 10 mg, Oral, Daily With Breakfast  sertraline, 25 mg, Oral, Daily      Continuous Infusions:   PRN Meds:.  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        Brief Hospital Course to date:  Agnieszka Rizzo is a 89 y.o. female presents the hospital with abdominal pain and cough and was found to have infectious findings and CT chest concerning for possible pneumonia or bronchiectasis exacerbation and many gallstones.    Discussion/plan for today:   Patient uses nocturnal oxygen at home only, has been needing 2 L intermittently here.  Plan for supplemental oxygen and titrate as needed. Prednisone reduced back to home dose today.  Leukocytosis likely steroid related.  Add scheduled Tylenol for mild back pain from compression fractures.  Patient initially not interested in MRI-however she thought about it and would like MRI at this time now-pending.  Continue to monitor in the hospital, need to continue to monitor her hypoxia and her white count response to decreasing the steroids...     Possible pneumonia, bronchiectasis exacerbation, bronchiolitis:  CT noted with infiltrates and tree-in-bud findings concerning for pneumonia or bronchiectasis exacerbation.  Currently improving clinically with doxycycline and ceftriaxone.  Leukocytosis resolved but then rebounded following steroids..  Flutter valve added.  Some mild intermittent hypoxia.  Titrate supplemental oxygen as needed.    New compression deformity at T6 and progression of compression at T7 although present on recent CT: Patient complaining of intermittent back pain.  Add twice daily scheduled Tylenol. MRI is  pending- BRIAN to follow up today.  Supportive care and symptom treatment.    Abdominal pain and gallstones: Resolved, etiology unclear, surgery consult team evaluated and does not feel patient has acute cholecystitis and will follow her up in clinic, tolerating diet without issues    Dementia: Supportive care and symptom treatment.  Noted.  Add low-dose melatonin to promote sleep.    Hypertension: Monitor blood pressure and adjust as needed.    Acute hypoxic respiratory failure: Resolved.    Chronic diastolic CHF: Compensated.  Continue appropriate cardiac medications and monitor volume status.  Previous echocardiogram reviewed    Physical debility: PT.    DVT Prophylaxis:   SCDs      Disposition: Return to [facility] when improved- pending results of MRI/BRIAN consult.     CODE STATUS:   Code Status and Medical Interventions: No CPR (Do Not Attempt to Resuscitate); Limited Support; No intubation (DNI)   Ordered at: 12/16/24 1831     Medical Intervention Limits:    No intubation (DNI)     Code Status (Patient has no pulse and is not breathing):    No CPR (Do Not Attempt to Resuscitate)     Medical Interventions (Patient has pulse or is breathing):    Limited Support       Kiara Zhao MD  12/19/24

## 2024-12-19 NOTE — PLAN OF CARE
Goal Outcome Evaluation:              Problem: Adult Inpatient Plan of Care  Goal: Optimal Comfort and Wellbeing  Outcome: Progressing  Intervention: Provide Person-Centered Care  Recent Flowsheet Documentation  Taken 12/19/2024 1412 by Yesenia Santo RN  Trust Relationship/Rapport:   care explained   choices provided   thoughts/feelings acknowledged  Taken 12/19/2024 0901 by Yesenia Santo RN  Trust Relationship/Rapport:   care explained   choices provided   thoughts/feelings acknowledged     Problem: Skin Injury Risk Increased  Goal: Skin Health and Integrity  Outcome: Progressing  Intervention: Optimize Skin Protection  Recent Flowsheet Documentation  Taken 12/19/2024 1610 by Yesenia Santo RN  Head of Bed (HOB) Positioning: HOB elevated  Taken 12/19/2024 1412 by Yesenia Santo RN  Activity Management:   ambulated in room   back to bed   ambulated outside room  Taken 12/19/2024 1200 by Yesenia Santo RN  Head of Bed (HOB) Positioning: HOB elevated     Problem: Adult Inpatient Plan of Care  Goal: Absence of Hospital-Acquired Illness or Injury  Intervention: Identify and Manage Fall Risk  Recent Flowsheet Documentation  Taken 12/19/2024 1610 by Yesenia Santo RN  Safety Promotion/Fall Prevention:   activity supervised   assistive device/personal items within reach   clutter free environment maintained   fall prevention program maintained   nonskid shoes/slippers when out of bed   room organization consistent   safety round/check completed  Taken 12/19/2024 1412 by Yesenia Santo RN  Safety Promotion/Fall Prevention:   activity supervised   assistive device/personal items within reach   clutter free environment maintained   fall prevention program maintained   nonskid shoes/slippers when out of bed   room organization consistent   safety round/check completed  Taken 12/19/2024 1200 by Yesenia Santo, RN  Safety Promotion/Fall Prevention:   activity supervised    assistive device/personal items within reach   clutter free environment maintained   fall prevention program maintained   nonskid shoes/slippers when out of bed   room organization consistent   safety round/check completed  Intervention: Prevent Skin Injury  Recent Flowsheet Documentation  Taken 12/19/2024 1610 by Yesenia Santo RN  Body Position: position changed independently  Taken 12/19/2024 1412 by Yesenia Santo RN  Body Position: position changed independently  Taken 12/19/2024 1200 by Yesenia Santo RN  Body Position: position changed independently  Intervention: Prevent Infection  Recent Flowsheet Documentation  Taken 12/19/2024 1610 by Yesenia Santo RN  Infection Prevention:   hand hygiene promoted   rest/sleep promoted  Taken 12/19/2024 1412 by Yesenia Santo RN  Infection Prevention:   hand hygiene promoted   rest/sleep promoted  Taken 12/19/2024 1200 by Yesenia Santo RN  Infection Prevention:   hand hygiene promoted   rest/sleep promoted  Taken 12/19/2024 0901 by Yesenia Santo RN  Infection Prevention:   hand hygiene promoted   rest/sleep promoted     Problem: Comorbidity Management  Goal: Maintenance of Asthma Control  Intervention: Maintain Asthma Symptom Control  Recent Flowsheet Documentation  Taken 12/19/2024 1610 by Yesenia Santo RN  Medication Review/Management: medications reviewed  Taken 12/19/2024 1412 by Yesenia Santo RN  Medication Review/Management: medications reviewed  Taken 12/19/2024 1200 by Yesenia Santo RN  Medication Review/Management: medications reviewed  Taken 12/19/2024 0901 by Yesenia Santo RN  Medication Review/Management: medications reviewed  Goal: Maintenance of Heart Failure Symptom Control  Intervention: Maintain Heart Failure Management  Recent Flowsheet Documentation  Taken 12/19/2024 1610 by Yesenia Santo RN  Medication Review/Management: medications reviewed  Taken 12/19/2024  1412 by Yesenia Santo RN  Medication Review/Management: medications reviewed  Taken 12/19/2024 1200 by Yesenia Santo RN  Medication Review/Management: medications reviewed  Taken 12/19/2024 0901 by Yesenia Santo RN  Medication Review/Management: medications reviewed  Goal: Blood Pressure in Desired Range  Intervention: Maintain Blood Pressure Management  Recent Flowsheet Documentation  Taken 12/19/2024 1610 by Yesenia Santo RN  Medication Review/Management: medications reviewed  Taken 12/19/2024 1412 by Yesenia Santo RN  Medication Review/Management: medications reviewed  Taken 12/19/2024 1200 by Yesenia Santo RN  Medication Review/Management: medications reviewed  Taken 12/19/2024 0901 by Yesenia Santo RN  Medication Review/Management: medications reviewed     Problem: Fall Injury Risk  Goal: Absence of Fall and Fall-Related Injury  Intervention: Identify and Manage Contributors  Recent Flowsheet Documentation  Taken 12/19/2024 1610 by Yesenia Santo RN  Medication Review/Management: medications reviewed  Taken 12/19/2024 1412 by Yesenia Santo RN  Medication Review/Management: medications reviewed  Taken 12/19/2024 1200 by Yesenia Santo RN  Medication Review/Management: medications reviewed  Taken 12/19/2024 0901 by Yesenia Santo RN  Medication Review/Management: medications reviewed  Intervention: Promote Injury-Free Environment  Recent Flowsheet Documentation  Taken 12/19/2024 1610 by Yesenia Santo RN  Safety Promotion/Fall Prevention:   activity supervised   assistive device/personal items within reach   clutter free environment maintained   fall prevention program maintained   nonskid shoes/slippers when out of bed   room organization consistent   safety round/check completed  Taken 12/19/2024 1412 by Yesenia Santo RN  Safety Promotion/Fall Prevention:   activity supervised   assistive device/personal items  within reach   clutter free environment maintained   fall prevention program maintained   nonskid shoes/slippers when out of bed   room organization consistent   safety round/check completed  Taken 12/19/2024 1200 by Yesenia Santo RN  Safety Promotion/Fall Prevention:   activity supervised   assistive device/personal items within reach   clutter free environment maintained   fall prevention program maintained   nonskid shoes/slippers when out of bed   room organization consistent   safety round/check completed

## 2024-12-19 NOTE — PROGRESS NOTES
[facility] THORACIC/LUMBAR NEUROSURGERY PROGRESS NOTE      CC: compression fractures      Subjective     Interval History: Had MRI thoracic and lumbar spine.  Reports upper back pain.  Cannot classify its severity but worse with movement and cough.    ROS:  Constitutional: No fever, chills  MS: back pain  Neuro: No numbness, tingling, or weakness,  no balance difficulties  : No difficulty voiding, no incontinence    Objective     Vital signs in last 24 hours:  Temp:  [97.5 °F (36.4 °C)-97.9 °F (36.6 °C)] 97.9 °F (36.6 °C)  Heart Rate:  [] 102  Resp:  [18-24] 24  BP: (124-141)/(63-77) 128/66    Intake/Output this shift:  I/O this shift:  In: 240 [P.O.:240]  Out: -     LABS:  Results from last 7 days   Lab Units 12/20/24  0504 12/19/24  0538 12/18/24  0517   WBC 10*3/mm3 13.93* 13.22* 15.60*   HEMOGLOBIN g/dL 10.2* 9.6* 12.4   HEMATOCRIT % 31.0* 29.5* 38.7   PLATELETS 10*3/mm3 287 294 261     Results from last 7 days   Lab Units 12/20/24  0504 12/19/24  0538 12/18/24  0517 12/17/24  0506 12/16/24  1241   SODIUM mmol/L 141 140 142   < > 141   POTASSIUM mmol/L 3.6 3.5 3.7   < > 4.0   CHLORIDE mmol/L 101 104 102   < > 105   CO2 mmol/L 31.0* 28.8 27.4   < > 28.0   BUN mg/dL 18 24* 23   < > 10   CREATININE mg/dL 0.72 0.87 0.78   < > 0.94   CALCIUM mg/dL 9.1 9.3 9.3   < > 9.5   BILIRUBIN mg/dL  --   --   --   --  0.4   ALK PHOS U/L  --   --   --   --  117   ALT (SGPT) U/L  --   --   --   --  22   AST (SGOT) U/L  --   --   --   --  27   GLUCOSE mg/dL 114* 93 107*   < > 145*    < > = values in this interval not displayed.     Blood Culture 12/16-NGTD    IMAGING STUDIES:  MRI T/L spine-acute to subacute T6 and T7 compression deformities with no evidence of significant retropulsion.  Chronic T11 and L1 compression fractures.  Significant canal stenosis or any cord signal abnormality or cord compression in the thoracic spine.  Lumbar spine there are multilevel degenerative changes including anterolisthesis of L3 on 4, DDD  L2/3, L3/4, L4/5.  No lumbar acute fractures.  There is mild-moderate canal and foraminal stenosis at several levels due to DDD, ligamentum flavum and facet hypertrophy.    I personally viewed and interpreted the patient's MRI thoracic and lumbar spine.  Also reviewed by and discussed with Dr. Shane.    Meds reviewed/changed: Yes  Tylenol 650 mg p.o. twice daily  Rocephin 1 g IV every 24 hours  Doxycycline 100 mg p.o. every 12 hours  Prednisone 10 mg p.o. daily  Norco 5 mg 1/2 tablet p.o. every 4 as needed-4 doses    Physical Exam:    General:   Awake, alert.  Sitting upright in bed.  Pleasant and cooperative.  Mobilizes in bed standby assist.  Does not appear to have much discomfort with position changes  Back:    Mild tenderness mid thoracic  Motor:    Normal muscle strength, bulk and tone in lower extremities.    Station and Gait:             Up with assist        Assessment & Plan     ASSESSMENT:      Osteoporotic compression fracture of thoracic spine    Abdominal pain    Patient with pneumonia and incidental finding of thoracic compression fracture on CT chest.  She initially denied any back pain and declined bracing or MRI but after further discussion with family, MRI was ordered and completed.  It does show acute to subacute T6 and T7 compression deformities.  Patient today tells me she does have some varying degrees of back pain.  She is taking very low-dose of pain medication.  She mobilizes independently in the bed for me without much discomfort and only has mild tenderness to palpation and percussion in the upper to mid thoracic spine.  She has no weakness in her legs.  Recommend TLSO bracing for comfort and activity restrictions for fracture healing.  We did discuss kyphoplasty which she and her daughter both feel should be a last resort.  Will arrange upright x-rays in brace prior to discharge for baseline and then follow-up in 6 weeks with repeat x-rays.  They can call in the meantime with questions  "concerns or worsening symptoms.    PLAN:   TLSO brace  Upright xrays for baseline  F/u 6 wks with xrays    I discussed the patient's findings and my recommendations with patient, family, and Dr. Shane    During patient visit, I utilized appropriate personal protective equipment including gloves. Appropriate PPE was worn during the entire visit.  Hand hygiene was completed before and after.      LOS: 2 days       Breanna Celaya, APRN  12/20/2024  14:21 EST    \"Dictated utilizing Dragon dictation\".      "

## 2024-12-19 NOTE — PROGRESS NOTES
RT called to give prn neb tx to patient in 624. At 0910 Patient was sitting at bedside eating her breakfast  on room air with her nasal cannula out of her nose. I put her nasal cannula back on and assessed her and her breath sounds  were diminish and unlabored. Patient was swallowing her food ok as well. She had a nebulizer treatment earlier this morning. The PRN treatment was not warranted at this time.

## 2024-12-20 ENCOUNTER — APPOINTMENT (OUTPATIENT)
Dept: GENERAL RADIOLOGY | Facility: HOSPITAL | Age: 89
End: 2024-12-20
Payer: MEDICARE

## 2024-12-20 ENCOUNTER — TELEPHONE (OUTPATIENT)
Dept: NEUROSURGERY | Facility: CLINIC | Age: 89
End: 2024-12-20
Payer: MEDICARE

## 2024-12-20 DIAGNOSIS — M80.88XA OSTEOPOROTIC COMPRESSION FRACTURE OF VERTEBRA, INITIAL ENCOUNTER: Primary | ICD-10-CM

## 2024-12-20 LAB
ANION GAP SERPL CALCULATED.3IONS-SCNC: 9 MMOL/L (ref 5–15)
BUN SERPL-MCNC: 18 MG/DL (ref 8–23)
BUN/CREAT SERPL: 25 (ref 7–25)
CALCIUM SPEC-SCNC: 9.1 MG/DL (ref 8.2–9.6)
CHLORIDE SERPL-SCNC: 101 MMOL/L (ref 98–107)
CO2 SERPL-SCNC: 31 MMOL/L (ref 22–29)
CREAT SERPL-MCNC: 0.72 MG/DL (ref 0.57–1)
DEPRECATED RDW RBC AUTO: 49.2 FL (ref 37–54)
EGFRCR SERPLBLD CKD-EPI 2021: 77.6 ML/MIN/1.73
ERYTHROCYTE [DISTWIDTH] IN BLOOD BY AUTOMATED COUNT: 13.7 % (ref 12.3–15.4)
GLUCOSE SERPL-MCNC: 114 MG/DL (ref 65–99)
HCT VFR BLD AUTO: 31 % (ref 34–46.6)
HGB BLD-MCNC: 10.2 G/DL (ref 12–15.9)
MCH RBC QN AUTO: 32.4 PG (ref 26.6–33)
MCHC RBC AUTO-ENTMCNC: 32.9 G/DL (ref 31.5–35.7)
MCV RBC AUTO: 98.4 FL (ref 79–97)
PLATELET # BLD AUTO: 287 10*3/MM3 (ref 140–450)
PMV BLD AUTO: 10 FL (ref 6–12)
POTASSIUM SERPL-SCNC: 3.6 MMOL/L (ref 3.5–5.2)
RBC # BLD AUTO: 3.15 10*6/MM3 (ref 3.77–5.28)
SODIUM SERPL-SCNC: 141 MMOL/L (ref 136–145)
WBC NRBC COR # BLD AUTO: 13.93 10*3/MM3 (ref 3.4–10.8)

## 2024-12-20 PROCEDURE — 94799 UNLISTED PULMONARY SVC/PX: CPT

## 2024-12-20 PROCEDURE — 63710000001 PREDNISONE PER 5 MG: Performed by: INTERNAL MEDICINE

## 2024-12-20 PROCEDURE — 94664 DEMO&/EVAL PT USE INHALER: CPT

## 2024-12-20 PROCEDURE — 25010000002 CEFTRIAXONE PER 250 MG: Performed by: INTERNAL MEDICINE

## 2024-12-20 PROCEDURE — 97110 THERAPEUTIC EXERCISES: CPT

## 2024-12-20 PROCEDURE — 97530 THERAPEUTIC ACTIVITIES: CPT

## 2024-12-20 PROCEDURE — 94761 N-INVAS EAR/PLS OXIMETRY MLT: CPT

## 2024-12-20 PROCEDURE — 94760 N-INVAS EAR/PLS OXIMETRY 1: CPT

## 2024-12-20 PROCEDURE — 85027 COMPLETE CBC AUTOMATED: CPT | Performed by: INTERNAL MEDICINE

## 2024-12-20 PROCEDURE — 72072 X-RAY EXAM THORAC SPINE 3VWS: CPT

## 2024-12-20 PROCEDURE — 80048 BASIC METABOLIC PNL TOTAL CA: CPT | Performed by: INTERNAL MEDICINE

## 2024-12-20 RX ADMIN — Medication 1 TABLET: at 08:18

## 2024-12-20 RX ADMIN — Medication 2.5 MG: at 21:21

## 2024-12-20 RX ADMIN — ACETAMINOPHEN 650 MG: 325 TABLET ORAL at 21:22

## 2024-12-20 RX ADMIN — IPRATROPIUM BROMIDE AND ALBUTEROL SULFATE 3 ML: 2.5; .5 SOLUTION RESPIRATORY (INHALATION) at 06:35

## 2024-12-20 RX ADMIN — Medication 1 TABLET: at 21:21

## 2024-12-20 RX ADMIN — IPRATROPIUM BROMIDE AND ALBUTEROL SULFATE 3 ML: 2.5; .5 SOLUTION RESPIRATORY (INHALATION) at 11:39

## 2024-12-20 RX ADMIN — CARVEDILOL 3.12 MG: 3.12 TABLET, FILM COATED ORAL at 18:11

## 2024-12-20 RX ADMIN — CEFTRIAXONE SODIUM 1000 MG: 1 INJECTION, POWDER, FOR SOLUTION INTRAMUSCULAR; INTRAVENOUS at 21:22

## 2024-12-20 RX ADMIN — HYDROCODONE BITARTRATE AND ACETAMINOPHEN 0.5 TABLET: 5; 325 TABLET ORAL at 18:16

## 2024-12-20 RX ADMIN — GUAIFENESIN 600 MG: 600 TABLET, MULTILAYER, EXTENDED RELEASE ORAL at 08:18

## 2024-12-20 RX ADMIN — HYDROCODONE BITARTRATE AND ACETAMINOPHEN 0.5 TABLET: 5; 325 TABLET ORAL at 13:17

## 2024-12-20 RX ADMIN — DOXYCYCLINE 100 MG: 100 CAPSULE ORAL at 21:21

## 2024-12-20 RX ADMIN — BUDESONIDE AND FORMOTEROL FUMARATE DIHYDRATE 1 PUFF: 160; 4.5 AEROSOL RESPIRATORY (INHALATION) at 21:03

## 2024-12-20 RX ADMIN — PREDNISONE 10 MG: 10 TABLET ORAL at 08:18

## 2024-12-20 RX ADMIN — DOXYCYCLINE 100 MG: 100 CAPSULE ORAL at 08:18

## 2024-12-20 RX ADMIN — HYDROCODONE BITARTRATE AND ACETAMINOPHEN 0.5 TABLET: 5; 325 TABLET ORAL at 00:33

## 2024-12-20 RX ADMIN — BUMETANIDE 2 MG: 2 TABLET ORAL at 08:18

## 2024-12-20 RX ADMIN — SERTRALINE HYDROCHLORIDE 25 MG: 25 TABLET ORAL at 08:18

## 2024-12-20 RX ADMIN — CARVEDILOL 3.12 MG: 3.12 TABLET, FILM COATED ORAL at 08:18

## 2024-12-20 RX ADMIN — GUAIFENESIN 600 MG: 600 TABLET, MULTILAYER, EXTENDED RELEASE ORAL at 21:22

## 2024-12-20 RX ADMIN — CEFTRIAXONE SODIUM 1000 MG: 1 INJECTION, POWDER, FOR SOLUTION INTRAMUSCULAR; INTRAVENOUS at 00:34

## 2024-12-20 RX ADMIN — ACETAMINOPHEN 650 MG: 325 TABLET ORAL at 08:18

## 2024-12-20 RX ADMIN — IPRATROPIUM BROMIDE AND ALBUTEROL SULFATE 3 ML: 2.5; .5 SOLUTION RESPIRATORY (INHALATION) at 21:03

## 2024-12-20 RX ADMIN — BUDESONIDE AND FORMOTEROL FUMARATE DIHYDRATE 1 PUFF: 160; 4.5 AEROSOL RESPIRATORY (INHALATION) at 06:38

## 2024-12-20 NOTE — PROGRESS NOTES
[facility] Medicine Services  PROGRESS NOTE    Patient Name: Agnieszka Rizzo  : 1930  MRN: [id number]    Date of Admission: 2024  Primary Care Physician: Matt Rose MD    Subjective   Subjective     CC:  Follow-up abdominal pain and back pain    Subjective: Sitting up at the side of the bed, going to eat breakfast. MRI completed- BRIAN recommending brace- daughter at bedside later- concerned about weakness/would like PT to work with patient with brace to make sure she can do it before going back to her independent living.         Objective   Objective     Vital Signs:   Temp:  [97.5 °F (36.4 °C)-97.9 °F (36.6 °C)] 97.7 °F (36.5 °C)  Heart Rate:  [] 95  Resp:  [18-24] 24  BP: (124-131)/(63-77) 131/76        Physical Exam:  Constitutional: Awake, alert, elderly appearing  HENT: NCAT, mucous membranes moist, neck supple  Respiratory: Occasional cough is present, congestion, breathing nonlabored  Cardiovascular: Pulse rate is normal, palpable radial pulses  Gastrointestinal:  Soft, nontender, nondistended  Musculoskeletal: Frail and chronically debilitated appearance, BMI is 21  Psychiatric: Appropriate affect, cooperative, conversational  Neurologic: Poor historian, no slurred speech or facial droop, follows commands  Skin: No rashes or jaundice, warm      Results Reviewed:  Results from last 7 days   Lab Units 24  0504 24  0538 24  0517   WBC 10*3/mm3 13.93* 13.22* 15.60*   HEMOGLOBIN g/dL 10.2* 9.6* 12.4   HEMATOCRIT % 31.0* 29.5* 38.7   PLATELETS 10*3/mm3 287 294 261     Results from last 7 days   Lab Units 24  0504 24  0538 24  0517 24  0506 24  1434 24  1241   SODIUM mmol/L 141 140 142   < >  --  141   POTASSIUM mmol/L 3.6 3.5 3.7   < >  --  4.0   CHLORIDE mmol/L 101 104 102   < >  --  105   CO2 mmol/L 31.0* 28.8 27.4   < >  --  28.0   BUN mg/dL * 23   < >  --  10   CREATININE mg/dL 0.72 0.87 0.78   < >  --  0.94   GLUCOSE mg/dL  114* 93 107*   < >  --  145*   CALCIUM mg/dL 9.1 9.3 9.3   < >  --  9.5   ALK PHOS U/L  --   --   --   --   --  117   ALT (SGPT) U/L  --   --   --   --   --  22   AST (SGOT) U/L  --   --   --   --   --  27   HSTROP T ng/L  --   --   --   --  31* 37*   PROBNP pg/mL  --   --   --   --   --  5,193.0*    < > = values in this interval not displayed.     Estimated Creatinine Clearance: 42.2 mL/min (by C-G formula based on SCr of 0.72 mg/dL).    Microbiology Results Abnormal       None            Imaging Results (Last 24 Hours)       Procedure Component Value Units Date/Time    XR Spine Thoracic 3 View [[id number]] Resulted: 12/20/24 1344     Updated: 12/20/24 1514    MRI Thoracic Spine Without Contrast [[id number]] Collected: 12/19/24 1625     Updated: 12/19/24 1658    Narrative:      MRI THORACIC SPINE WO CONTRAST-, MRI LUMBAR SPINE WO CONTRAST-     HISTORY:  evaluate for acute compression fracture; back pain;  R10.9-Unspecified abdominal pain; J96.21-Acute and chronic respiratory  failure with hypoxia; K80.20-Calculus of gallbladder without  cholecystitis without obstruction; R93.5-Abnormal findings on diagnostic  imaging of other abdominal regions, including retroperitoneum;  M43.9-Deforming dorsopathy, unspecified     COMPARISON: No prior MRI examination of the thoracic or lumbar spine is  available for comparison     MRI EXAMINATION OF THE THORACIC SPINE WITHOUT CONTRAST:     The alignment of the thoracic spine is within normal limits. Compression  deformities involving T6 and T7 are appreciated with associated marrow  edema consistent with acute to subacute fractures. Loss of vertical  height at T6 and T7 is estimated to be approximately 6 mm at each level.  There is no evidence of bony retropulsion. Concave deformities involving  the endplates from the superior endplate of T11 to the superior endplate  of L1 are appreciated without marrow edema. Schmorl's nodes are  appreciated involving the endplates from T8-L2.  Signal intensity within  the cord is normal on the sagittal and axial T2 sequences. Mild to  moderate facet degenerative disease is present bilaterally at T10/T11.       Impression:      Acute to subacute compression fractures involving T6 and T7  are appreciated with loss of approximately 1 cm vertical height at each  level centrally. There is no evidence of cord signal abnormality, cord  compression or disc herniation. See above.     MRI EXAMINATION OF THE LUMBAR SPINE WITHOUT CONTRAST:     There is a grade 1/grade 2 anterolisthesis of L3 upon L4 estimated to be  approximately 7 mm. There is moderate to severe loss of disc height and  disc desiccation at L2-3, L3-L4 and L4-L5. The conus is at L1 and the  caudal aspect the spinal cord appears unremarkable. Concave deformities  involving the endplates are appreciated at L4 and to a lesser extent L1  and T12. There is no evidence of marrow edema to suggest an acute or  subacute component. Multiple Schmorl's nodes are appreciated.     T12-L1: A minimal central disc osteophyte complex is present with  evidence of herniation.     L1-L2: Mild foraminal stenosis is present on the left secondary to  extension of a small disc osteophyte complex into the neural foramen.     L2-L3: There is mild canal stenosis secondary to broad-based disc  osteophyte complex which is more prominent to the right. There is mild  and mild to moderate lateral recess narrowing on the left and right  respectively. Mild foraminal stenosis is present bilaterally, more  prominent on the right secondary to extension of the disc osteophyte  complex into the neural foramen.     L3-L4: There is moderate canal stenosis and moderate lateral recess  narrowing bilaterally secondary to moderate facet and ligamentum flavum  hypertrophy and a moderate broad-based disc osteophyte complex. Mild to  moderate foraminal stenosis is appreciated bilaterally.     L4-L5: A mild broad-based disc osteophyte complex is  present which  results in mild flattening of the ventral surface of the thecal sac.  Mild to moderate lateral recess narrowing is present bilaterally. Mild  to moderate foraminal stenosis is present on the right secondary to  extension of the disc osteophyte complex into the neural foramen. Mild  foraminal stenosis is present on the left secondary to facet  hypertrophy.     L5-S1: Mild to moderate facet degenerative disease is present  bilaterally. A mild central broad-based disc osteophyte complex is  present with zones of herniation.     IMPRESSION: There is no evidence of a focal herniation. Multilevel  degenerative disease involving lumbar spine is noted as described above  most prominent which is at L3-L4. There is moderate canal stenosis and  lateral recess narrowing bilaterally secondary to posterior element  degenerative disease, broad-based disc osteophyte complex and  anterolisthesis of L3 upon L4. Multilevel concave deformities (without  edema to suggest an acute or subacute component) involving the  thoracolumbar spine and multiple Schmorl's nodes are appreciated. There  is no evidence of an acute compression fracture.     This report was finalized on 12/19/2024 4:54 PM by Dr. Lon Velasquez M.D on Workstation: [id number]       MRI Lumbar Spine Without Contrast [[id number]] Collected: 12/19/24 1625     Updated: 12/19/24 1658    Narrative:      MRI THORACIC SPINE WO CONTRAST-, MRI LUMBAR SPINE WO CONTRAST-     HISTORY:  evaluate for acute compression fracture; back pain;  R10.9-Unspecified abdominal pain; J96.21-Acute and chronic respiratory  failure with hypoxia; K80.20-Calculus of gallbladder without  cholecystitis without obstruction; R93.5-Abnormal findings on diagnostic  imaging of other abdominal regions, including retroperitoneum;  M43.9-Deforming dorsopathy, unspecified     COMPARISON: No prior MRI examination of the thoracic or lumbar spine is  available for comparison     MRI EXAMINATION OF  THE THORACIC SPINE WITHOUT CONTRAST:     The alignment of the thoracic spine is within normal limits. Compression  deformities involving T6 and T7 are appreciated with associated marrow  edema consistent with acute to subacute fractures. Loss of vertical  height at T6 and T7 is estimated to be approximately 6 mm at each level.  There is no evidence of bony retropulsion. Concave deformities involving  the endplates from the superior endplate of T11 to the superior endplate  of L1 are appreciated without marrow edema. Schmorl's nodes are  appreciated involving the endplates from T8-L2. Signal intensity within  the cord is normal on the sagittal and axial T2 sequences. Mild to  moderate facet degenerative disease is present bilaterally at T10/T11.       Impression:      Acute to subacute compression fractures involving T6 and T7  are appreciated with loss of approximately 1 cm vertical height at each  level centrally. There is no evidence of cord signal abnormality, cord  compression or disc herniation. See above.     MRI EXAMINATION OF THE LUMBAR SPINE WITHOUT CONTRAST:     There is a grade 1/grade 2 anterolisthesis of L3 upon L4 estimated to be  approximately 7 mm. There is moderate to severe loss of disc height and  disc desiccation at L2-3, L3-L4 and L4-L5. The conus is at L1 and the  caudal aspect the spinal cord appears unremarkable. Concave deformities  involving the endplates are appreciated at L4 and to a lesser extent L1  and T12. There is no evidence of marrow edema to suggest an acute or  subacute component. Multiple Schmorl's nodes are appreciated.     T12-L1: A minimal central disc osteophyte complex is present with  evidence of herniation.     L1-L2: Mild foraminal stenosis is present on the left secondary to  extension of a small disc osteophyte complex into the neural foramen.     L2-L3: There is mild canal stenosis secondary to broad-based disc  osteophyte complex which is more prominent to the right.  There is mild  and mild to moderate lateral recess narrowing on the left and right  respectively. Mild foraminal stenosis is present bilaterally, more  prominent on the right secondary to extension of the disc osteophyte  complex into the neural foramen.     L3-L4: There is moderate canal stenosis and moderate lateral recess  narrowing bilaterally secondary to moderate facet and ligamentum flavum  hypertrophy and a moderate broad-based disc osteophyte complex. Mild to  moderate foraminal stenosis is appreciated bilaterally.     L4-L5: A mild broad-based disc osteophyte complex is present which  results in mild flattening of the ventral surface of the thecal sac.  Mild to moderate lateral recess narrowing is present bilaterally. Mild  to moderate foraminal stenosis is present on the right secondary to  extension of the disc osteophyte complex into the neural foramen. Mild  foraminal stenosis is present on the left secondary to facet  hypertrophy.     L5-S1: Mild to moderate facet degenerative disease is present  bilaterally. A mild central broad-based disc osteophyte complex is  present with zones of herniation.     IMPRESSION: There is no evidence of a focal herniation. Multilevel  degenerative disease involving lumbar spine is noted as described above  most prominent which is at L3-L4. There is moderate canal stenosis and  lateral recess narrowing bilaterally secondary to posterior element  degenerative disease, broad-based disc osteophyte complex and  anterolisthesis of L3 upon L4. Multilevel concave deformities (without  edema to suggest an acute or subacute component) involving the  thoracolumbar spine and multiple Schmorl's nodes are appreciated. There  is no evidence of an acute compression fracture.     This report was finalized on 12/19/2024 4:54 PM by Dr. Lon Velasquez M.D on Workstation: [id number]               Results for orders placed during the hospital encounter of 10/04/23    Adult Transthoracic  Echo Complete W/ Cont if Necessary Per Protocol    Interpretation Summary    Left ventricular systolic function is normal. Calculated left ventricular EF = 56%    Normal right ventricular cavity size and systolic function noted.    The left atrial cavity is severely dilated.    The right atrial cavity is severely dilated.    Mild to moderate mitral valve regurgitation is present.    Moderate tricuspid valve regurgitation is present.    Calculated right ventricular systolic pressure from tricuspid regurgitation is 49 mmHg.    There is a small (<1cm) circumferential pericardial effusion. There is no evidence of cardiac tamponade.      I have reviewed the medications:  Scheduled Meds:acetaminophen, 650 mg, Oral, BID  budesonide-formoterol, 1 puff, Inhalation, BID - RT  bumetanide, 2 mg, Oral, Daily  carvedilol, 3.125 mg, Oral, BID With Meals  cefTRIAXone, 1,000 mg, Intravenous, Q24H  doxycycline, 100 mg, Oral, Q12H  guaiFENesin, 600 mg, Oral, BID  ipratropium-albuterol, 3 mL, Nebulization, Q6H While Awake - RT  melatonin, 2.5 mg, Oral, Nightly  multivitamin with minerals, 1 tablet, Oral, BID  predniSONE, 10 mg, Oral, Daily With Breakfast  sertraline, 25 mg, Oral, Daily      Continuous Infusions:   PRN Meds:.  acetaminophen **OR** acetaminophen **OR** acetaminophen    albuterol    benzonatate    senna-docusate sodium **AND** polyethylene glycol **AND** bisacodyl **AND** bisacodyl    HYDROcodone-acetaminophen    melatonin    ondansetron ODT **OR** ondansetron    sodium chloride    Assessment & Plan   Assessment & Plan     Active Hospital Problems    Diagnosis  POA    **Osteoporotic compression fracture of thoracic spine [M80.88XA]  Yes    Abdominal pain [R10.9]  Yes      Resolved Hospital Problems   No resolved problems to display.        Brief Hospital Course to date:  Agnieszka Rizzo is a 89 y.o. female presents the hospital with abdominal pain and cough and was found to have infectious findings and CT chest concerning  for possible pneumonia or bronchiectasis exacerbation and many gallstones.    Discussion/plan for today:   Patient uses nocturnal oxygen at home only, has been needing 2 L intermittently here.  Plan for supplemental oxygen and titrate as needed. Prednisone reduced back to home dose.  Leukocytosis likely steroid related.  Scheduled Tylenol for mild back pain from compression fractures.  MRI completed- BRIAN recommends LSO brace.  Daughter is concerned about patient becoming weak/would like physical therapy to work with her with the brace prior to returning to independent living..  Continue to monitor in the hospital, need to continue to monitor her hypoxia and her white count response to decreasing the steroids...     Possible pneumonia, bronchiectasis exacerbation, bronchiolitis:  CT noted with infiltrates and tree-in-bud findings concerning for pneumonia or bronchiectasis exacerbation.  Currently improving clinically with doxycycline and ceftriaxone.  Leukocytosis resolved but then rebounded following steroids..  Flutter valve added.  Some mild intermittent hypoxia.  Titrate supplemental oxygen as needed.    New compression deformity at T6 and progression of compression at T7 although present on recent CT: Patient complaining of intermittent back pain.  Add twice daily scheduled Tylenol. MRI completed- as above- BRIAN rec brace. Supportive care and symptom treatment.    Abdominal pain and gallstones: Resolved, etiology unclear, surgery consult team evaluated and does not feel patient has acute cholecystitis and will follow her up in clinic, tolerating diet without issues    Dementia: Supportive care and symptom treatment.  Noted.  Add low-dose melatonin to promote sleep.    Hypertension: Monitor blood pressure and adjust as needed.    Acute hypoxic respiratory failure: Resolved.    Chronic diastolic CHF: Compensated.  Continue appropriate cardiac medications and monitor volume status.  Previous echocardiogram  reviewed    Physical debility: PT.    DVT Prophylaxis:   SCDs      Disposition: Return to [facility] hopefully tomorrow- PT to work with patient and brace.     CODE STATUS:   Code Status and Medical Interventions: No CPR (Do Not Attempt to Resuscitate); Limited Support; No intubation (DNI)   Ordered at: 12/16/24 1831     Medical Intervention Limits:    No intubation (DNI)     Code Status (Patient has no pulse and is not breathing):    No CPR (Do Not Attempt to Resuscitate)     Medical Interventions (Patient has pulse or is breathing):    Limited Support       Kiara Zhao MD  12/20/24

## 2024-12-20 NOTE — TELEPHONE ENCOUNTER
Patient is scheduled on 1/30/2024@10:15 am for XR and appointment to follow@10:30am.        Reminder mailed to pt.

## 2024-12-20 NOTE — THERAPY TREATMENT NOTE
Patient Name: Agnieszka Rizzo  : 1930    MRN: [id number]                              Today's Date: 2024       Admit Date: 2024    Visit Dx:     ICD-10-CM ICD-9-CM   1. Acute abdominal pain  R10.9 789.00     338.19   2. Acute on chronic respiratory failure with hypoxia  J96.21 518.84     799.02   3. Calculus of gallbladder without cholecystitis without obstruction  K80.20 574.20   4. Abnormal CT of the abdomen  R93.5 793.6   5. Compression deformity of vertebra  M43.9 738.5   6. Osteoporotic compression fracture of vertebra with routine healing, subsequent encounter  M80.88XD V54.27     Patient Active Problem List   Diagnosis    Primary hypertension    Nonobstructive atherosclerosis of coronary artery    Familial hypercholesterolemia    Mitral valve insufficiency    Ventricular premature beats    Ventricular tachycardia    Chronic respiratory failure with hypoxia    DNR (do not resuscitate)    Asymptomatic bacteriuria    Iron deficiency anemia    Hypokalemia    Bronchiectasis    KINA (mycobacterium avium-intracellulare)    Permanent atrial fibrillation    Anxiety    Medicare annual wellness visit, subsequent    Herpes infection    Abnormal EKG    Alteration in anticoagulation    Blind right eye    COPD (chronic obstructive pulmonary disease)    Chronic diastolic CHF (congestive heart failure)    Orthostasis    Nonrheumatic tricuspid valve regurgitation    Pulmonary hypertension    Pericardial effusion    Fracture of greater trochanter of left femur    Leukocytosis    Anemia    Infectious disorder of bronchus    Rhinovirus infection    Calculus of gallbladder without cholecystitis without obstruction    Abdominal pain    Osteoporotic compression fracture of thoracic spine     Past Medical History:   Diagnosis Date    Aspergillus     Asthma     Atrial fibrillation     chronic    Atrial flutter     Bronchiectasis     C. difficile diarrhea 2017    CAD (coronary artery disease)     nonobstructive     Chronic diastolic CHF (congestive heart failure)     Clinical diagnosis of COVID-19 01/04/2022    Colitis     Cough     Cryoglobulinemia     Dyspnea on exertion     Exposure to hepatitis A 04/20/2018    Fall     Hyperlipidemia     Hypertension     Hyponatremia     Hypoxia     Infectious viral hepatitis     AGE 13    Left shoulder pain     Leg swelling     Lesion of lung     Malignant hyperthermia due to anesthesia     Mild tricuspid regurgitation     MR (mitral regurgitation)     mild    MVP (mitral valve prolapse)     Permanent atrial fibrillation     Pneumonia of both lungs due to infectious organism 05/31/2017    Followed BY ID for MAC      Pneumonia with the fungal infection aspergillosis 01/06/2017    Pneumothorax     SOB (shortness of breath)     Syncope 07/2024    UTI (urinary tract infection)     Wheeze     mild     Past Surgical History:   Procedure Laterality Date    BRONCHOSCOPY N/A 11/12/2016    Procedure: BRONCHOSCOPY WITH FLUORO, BRUSHINGS, BAL, AND BIOPSIES;  Surgeon: Rogelio Tucker MD;  Location: [facility] ENDOSCOPY;  Service:     BRONCHOSCOPY Bilateral 06/03/2017    Procedure: BRONCHOSCOPY with BAL ;  Surgeon: Sung King MD;  Location: [facility] ENDOSCOPY;  Service:     BRONCHOSCOPY N/A 12/17/2019    Procedure: BRONCHOSCOPY WITH WASHINGS;  Surgeon: Rogelio Tucker MD;  Location: [facility] ENDOSCOPY;  Service: Pulmonary    BRONCHOSCOPY N/A 07/15/2022    Procedure: BRONCHOSCOPY;  Surgeon: Rogelio Tucker MD;  Location: [facility] ENDOSCOPY;  Service: Pulmonary;  Laterality: N/A;  Pre: Pneumonia  Post: Pneumonia    BRONCHOSCOPY N/A 10/2/2023    Procedure: BRONCHOSCOPY WITH BRONCHIAL AVEOLAR LAVAGE;  Surgeon: Rogelio Tucker MD;  Location: [facility] ENDOSCOPY;  Service: Pulmonary;  Laterality: N/A;  PRE:BRONCHIECTASIS /   POST: SAME    CATARACT EXTRACTION EXTRACAPSULAR W/ INTRAOCULAR LENS IMPLANTATION      COLONOSCOPY      2013    D & C WITH SUCTION      HYSTERECTOMY      KNEE ARTHROSCOPY Left     Partial      General  Information       Row Name 12/20/24 1558          Physical Therapy Time and Intention    Document Type therapy note (daily note);other (see comments)  one time visit, already DC PT, but needed back brace educ  -     Mode of Treatment individual therapy;physical therapy  -       Row Name 12/20/24 1556          General Information    Patient Profile Reviewed yes  -     Existing Precautions/Restrictions fall;brace worn when out of bed  -       Row Name 12/20/24 1558          Living Environment    People in Home facility resident  -[facility] Name 12/20/24 1556          Cognition    Orientation Status (Cognition) oriented x 3  -[facility] Name 12/20/24 155          Safety Issues/Impairments Affecting Functional Mobility    Impairments Affecting Function (Mobility) balance;endurance/activity tolerance;pain  -               User Key  (r) = Recorded By, (t) = Taken By, (c) = Cosigned By      Initials Name Provider Type    Marcie Aguilar PTA Physical Therapist Assistant                   Mobility       Row Name 12/20/24 0720          Bed Mobility    Supine-Sit Burke (Bed Mobility) moderate assist (50% patient effort);verbal cues;nonverbal cues (demo/gesture)  -     Sit-Supine Burke (Bed Mobility) moderate assist (50% patient effort);minimum assist (75% patient effort);verbal cues;nonverbal cues (demo/gesture)  -     Assistive Device (Bed Mobility) bed rails  -     Comment, (Bed Mobility) educ on log roll, fam also present and educ on back prec and donning of TLSO  -[facility] Name 12/20/24 5452          Sit-Stand Transfer    Sit-Stand Burke (Transfers) contact guard;verbal cues  -     Assistive Device (Sit-Stand Transfers) walker, front-wheeled  -       Row Name 12/20/24 9117          Gait/Stairs (Locomotion)    Burke Level (Gait) not tested  -               User Key  (r) = Recorded By, (t) = Taken By, (c) = Cosigned By      Initials Name Provider Type    KELLIE  Marcie Avalos PTA Physical Therapist Assistant                   Obj/Interventions    No documentation.                  Goals/Plan    No documentation.                  Clinical Impression       Row Name 12/20/24 1556          Pain    Pretreatment Pain Rating 0/10 - no pain  -     Posttreatment Pain Rating 7/10  -JM     Pain Location back  -     Pain Management Interventions exercise or physical activity utilized;activity modification encouraged;movement retraining implemented  -     Response to Pain Interventions activity participation with increased pain  -       Row Name 12/20/24 1556          Plan of Care Review    Plan of Care Reviewed With patient;child  -     Outcome Evaluation Pt tolerated PT session for educ on back safety and donning TLSO, family present for educ so she can educ the facility if someone can assist with brace before pt gets up in chair or ambulates, plans back to facility possibly 12/21, awaits chest xray after brace donned  -       Row Name 12/20/24 1556          Positioning and Restraints    Pre-Treatment Position in bed  -     Post Treatment Position bed  -     In Bed fowlers;call light within reach;encouraged to call for assist;exit alarm on;with family/caregiver;notified [facility]  -               User Key  (r) = Recorded By, (t) = Taken By, (c) = Cosigned By      Initials Name Provider Type    Marcie Aguilar PTA Physical Therapist Assistant                   Outcome Measures    No documentation.                                Physical Therapy Education       Title: PT OT SLP Therapies (Done)       Topic: Physical Therapy (Done)       Point: Mobility training (Done)       Learning Progress Summary            Patient Acceptance, E, VU by JESSIE at 12/18/2024 0203    Comment: Discussed frequent turning with the patient and skin care for prevention of bed sores. Also discussed pain management and fall prevention. .                      Point: Home exercise program (Done)        Learning Progress Summary            Patient Acceptance, E, VU by  at 12/18/2024 0249    Comment: Discussed frequent turning with the patient and skin care for prevention of bed sores. Also discussed pain management and fall prevention. .                      Point: Body mechanics (Done)       Learning Progress Summary            Patient Acceptance, E, VU by  at 12/18/2024 0249    Comment: Discussed frequent turning with the patient and skin care for prevention of bed sores. Also discussed pain management and fall prevention. .                      Point: Precautions (Done)       Learning Progress Summary            Patient Acceptance, E, VU by  at 12/18/2024 0249    Comment: Discussed frequent turning with the patient and skin care for prevention of bed sores. Also discussed pain management and fall prevention. .                                      User Key       Initials Effective Dates Name Provider Type Discipline     09/17/24 -  Renita Davidson, RN Registered Nurse Nurse                  PT Recommendation and Plan     Outcome Evaluation: Pt tolerated PT session for educ on back safety and donning TLSO, family present for educ so she can educ the facility if someone can assist with brace before pt gets up in chair or ambulates, plans back to facility possibly 12/21, awaits chest xray after brace donned     Time Calculation:         PT Charges       Row Name 12/20/24 1554             Time Calculation    Start Time 1400  -      Stop Time 1430  -      Time Calculation (min) 30 min  -      PT Received On 12/20/24  -                User Key  (r) = Recorded By, (t) = Taken By, (c) = Cosigned By      Initials Name Provider Type    Marcie Aguilar PTA Physical Therapist Assistant                  Therapy Charges for Today       Code Description Service Date Service Provider Modifiers Qty    [id number]  PT THERAPEUTIC ACT EA 15 MIN 12/20/2024 Marcie Avalos PTA GP 1    [id number]  PT  THER PROC EA 15 MIN 12/20/2024 Marcie Avalos, EUGENIE GP 1            PT G-Codes  Outcome Measure Options: AM-PAC 6 Clicks Basic Mobility (PT)  AM-PAC 6 Clicks Score (PT): 19  PT Discharge Summary  Anticipated Discharge Disposition (PT): assisted living    Marcie Avalos PTA  12/20/2024

## 2024-12-20 NOTE — PLAN OF CARE
Goal Outcome Evaluation:   Pt VSS,assisted w/ADLs but ambulated well. Safety alarms active and audible. Care clustered to promote rest and pain mgmt. Pt noted to have more episodes of moderate-severe pain this shift.

## 2024-12-20 NOTE — CASE MANAGEMENT/SOCIAL WORK
Continued Stay Note  [facility]     Patient Name: Agnieszka Rizzo  MRN: [id number]  Today's Date: 12/20/2024    Admit Date: 12/16/2024    Plan: Return to [facility]   Discharge Plan       Row Name 12/20/24 1229       Plan    Plan Return to [facility]    Patient/Family in Agreement with Plan yes    Plan Comments Spoke with [facility], pt oxygen orders are continuous at 3L and she should have transport tank. BRIAN has seen and plans TLSO brace, per nursing this has been delivered to pt bedside. Likely ready for dc today, plan return to [facility], family to transport. CCP will follow - Karine ORTIZ                   Discharge Codes    No documentation.                 Expected Discharge Date and Time       Expected Discharge Date Expected Discharge Time    Dec 20, 2024               Karine Shea RN

## 2024-12-20 NOTE — TELEPHONE ENCOUNTER
----- Message from Breanna Celaya sent at 12/20/2024 11:21 AM EST -----  Regarding: Hospital follow-up  Patient needs 6-week follow-up with thoracic upright x-rays AP/lateral for T6 and T7 compression fracture, Dr. Shane patient.

## 2024-12-21 ENCOUNTER — READMISSION MANAGEMENT (OUTPATIENT)
Dept: CALL CENTER | Facility: HOSPITAL | Age: 89
End: 2024-12-21
Payer: MEDICARE

## 2024-12-21 VITALS
DIASTOLIC BLOOD PRESSURE: 92 MMHG | HEIGHT: 63 IN | RESPIRATION RATE: 20 BRPM | TEMPERATURE: 98 F | BODY MASS INDEX: 21.78 KG/M2 | HEART RATE: 89 BPM | WEIGHT: 122.9 LBS | OXYGEN SATURATION: 94 % | SYSTOLIC BLOOD PRESSURE: 109 MMHG

## 2024-12-21 LAB
BACTERIA SPEC AEROBE CULT: NORMAL
BACTERIA SPEC AEROBE CULT: NORMAL

## 2024-12-21 PROCEDURE — 94799 UNLISTED PULMONARY SVC/PX: CPT

## 2024-12-21 PROCEDURE — 94664 DEMO&/EVAL PT USE INHALER: CPT

## 2024-12-21 PROCEDURE — 94761 N-INVAS EAR/PLS OXIMETRY MLT: CPT

## 2024-12-21 PROCEDURE — 63710000001 PREDNISONE PER 5 MG: Performed by: INTERNAL MEDICINE

## 2024-12-21 PROCEDURE — 94760 N-INVAS EAR/PLS OXIMETRY 1: CPT

## 2024-12-21 RX ORDER — HYDROCODONE BITARTRATE AND ACETAMINOPHEN 5; 325 MG/1; MG/1
0.5 TABLET ORAL EVERY 6 HOURS PRN
Qty: 6 TABLET | Refills: 0 | Status: ON HOLD | OUTPATIENT
Start: 2024-12-21 | End: 2024-12-24

## 2024-12-21 RX ORDER — BUMETANIDE 2 MG/1
2 TABLET ORAL DAILY
Status: ON HOLD
Start: 2024-12-22

## 2024-12-21 RX ADMIN — IPRATROPIUM BROMIDE AND ALBUTEROL SULFATE 3 ML: 2.5; .5 SOLUTION RESPIRATORY (INHALATION) at 11:35

## 2024-12-21 RX ADMIN — GUAIFENESIN 600 MG: 600 TABLET, MULTILAYER, EXTENDED RELEASE ORAL at 08:58

## 2024-12-21 RX ADMIN — ACETAMINOPHEN 650 MG: 325 TABLET ORAL at 08:54

## 2024-12-21 RX ADMIN — IPRATROPIUM BROMIDE AND ALBUTEROL SULFATE 3 ML: 2.5; .5 SOLUTION RESPIRATORY (INHALATION) at 06:53

## 2024-12-21 RX ADMIN — CARVEDILOL 3.12 MG: 3.12 TABLET, FILM COATED ORAL at 08:55

## 2024-12-21 RX ADMIN — DOXYCYCLINE 100 MG: 100 CAPSULE ORAL at 08:54

## 2024-12-21 RX ADMIN — HYDROCODONE BITARTRATE AND ACETAMINOPHEN 0.5 TABLET: 5; 325 TABLET ORAL at 09:21

## 2024-12-21 RX ADMIN — Medication 1 TABLET: at 08:54

## 2024-12-21 RX ADMIN — BUMETANIDE 2 MG: 2 TABLET ORAL at 08:53

## 2024-12-21 RX ADMIN — BUDESONIDE AND FORMOTEROL FUMARATE DIHYDRATE 1 PUFF: 160; 4.5 AEROSOL RESPIRATORY (INHALATION) at 06:57

## 2024-12-21 RX ADMIN — SERTRALINE HYDROCHLORIDE 25 MG: 25 TABLET ORAL at 08:54

## 2024-12-21 RX ADMIN — PREDNISONE 10 MG: 10 TABLET ORAL at 08:54

## 2024-12-21 NOTE — PLAN OF CARE
Goal Outcome Evaluation:  Plan of Care Reviewed With: patient        Progress: improving  Outcome Evaluation: AOX4, VSS, Assistx1 standby, 3L Oxygen at night at home, Got pain medication for back pain. Call light within reach.

## 2024-12-21 NOTE — DISCHARGE SUMMARY
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        Hospital Course     Ms. Rizzo is a 89 y.o. female with a history of hypertension, chronic diastolic heart failure, dementia, bronchiectasis who presented to [facility] initially complaining of a multiday, back pain and a cough.  Please see the admitting history and physical for further details.  She was admitted to the hospital for further evaluation and treatment.  Chest CT obtained on admission revealed possible pneumonia versus bronchiectasis exacerbation and she was started on antibiotics and steroids.  Also noted on CT imaging on admission for gallstones without evidence of cholecystitis for which general surgery was consulted.  After discussion with family, it was decided to do a trial of conservative management with treatment of pain with Tylenol/NSAIDs, avoidance of food triggers such as fried/fatty foods.  She can follow-up in clinic to discuss possibility of elective cholecystectomy.   Also noted on imaging with a new compression fracture of T6 with progression of compression at T7.  Neurosurgery consulted and an MRI was obtained.  Neurosurgery recommended conservative management with TLSO bracing for comfort  and treatment with Tylenol and very low-dose narcotics as needed, they would like to follow-up with her in 6 weeks with x-rays in clinic.   Throughout the course of her hospitalization the patient's respiratory status continued to improve, she has completed a 5-day course of antibiotics and her steroids have been reduced back to her home dose of prednisone.  She is stable for discharge and will be receiving additional physical therapy at her independent living facility.  She should follow-up with her PCP in a week for posthospital follow-up visit as well as surgery and neurosurgery as directed above.    Day of Discharge     Subjective:  Resting in bed, RN at bedside. Having back pain this morning.     Physical Exam:  Temp:  [97.7 °F (36.5 °C)-98 °F (36.7 °C)] 98 °F (36.7 °C)  Heart Rate:  [77-98] 89  Resp:  [18-24] 20  BP: (109-137)/(59-92) 109/92  Body mass index is 21.77 kg/m².  Physical Exam  Constitutional: Awake, alert, elderly appearing  HENT: NCAT, mucous membranes moist, neck supple  Respiratory: Occasional cough is present, congestion, breathing nonlabored  Cardiovascular: Pulse rate is normal, palpable radial pulses  Gastrointestinal:  Soft, nontender, nondistended  Musculoskeletal: Frail and chronically debilitated appearance, BMI is 21  Psychiatric: Appropriate affect, cooperative, conversational  Neurologic: Poor historian, no slurred speech or facial droop, follows commands  Skin: No rashes or jaundice, warm  Consultants     Consult Orders (all) (From admission, onward)       Start     Ordered    12/16/24 2154  Inpatient Neurosurgery Consult  Once        Specialty:  Neurosurgery  Provider:  Jeff Han MD    12/16/24 2153 12/16/24 2034  Inpatient General Surgery Consult  Once        Specialty:  General Surgery  Provider:  Tonja Bennett MD    12/16/24 2034 12/16/24 1826  Inpatient Case Management  Consult  Once        Provider:  (Not yet assigned)    12/16/24 1827     12/16/24 1520  [facility] (on-call MD unless specified) Details  Once        Specialty:  Hospitalist  Provider:  Carlene Mooeny MD    12/16/24 1519                  Procedures     Imaging Results (All)       Procedure Component Value Units Date/Time    XR Spine Thoracic 3 View [[id number]] Collected: 12/20/24 1533     Updated: 12/20/24 1540    Narrative:      XR SPINE THORACIC 3 VW-     INDICATIONS: Fractures of T6 and T7.     TECHNIQUE: 3 views of the thoracic spine, standing in brace     COMPARISON: Correlated with CT from 12/16/2024     FINDINGS:     Previous CT demonstrated compression fractures of T6, T7, also apparent  on this exam, although less well characterized, appear grossly stable.  Schmorl nodes are again evident at the lower thoracic levels, and  endplate deformities at T11, and L1, noted on the prior exam, also  suggested on this exam, although less well-visualized. No new fracture  or malalignment is noted. Multilevel endplate spurring and disc space  narrowing are evident. Arterial calcifications are conspicuous, and the  heart appears enlarged.       Impression:         No significant change is identified radiographically.           This report was finalized on 12/20/2024 3:37 PM by Dr. Steven Garza M.D on Workstation: [id number]       MRI Thoracic Spine Without Contrast [[id number]] Collected: 12/19/24 1625     Updated: 12/19/24 1658    Narrative:      MRI THORACIC SPINE WO CONTRAST-, MRI LUMBAR SPINE WO CONTRAST-     HISTORY:  evaluate for acute compression fracture; back pain;  R10.9-Unspecified abdominal pain; J96.21-Acute and chronic respiratory  failure with hypoxia; K80.20-Calculus of gallbladder without  cholecystitis without obstruction; R93.5-Abnormal findings on diagnostic  imaging of other abdominal regions, including retroperitoneum;  M43.9-Deforming dorsopathy, unspecified     COMPARISON: No prior MRI examination of the thoracic or lumbar spine is  available for comparison     MRI  EXAMINATION OF THE THORACIC SPINE WITHOUT CONTRAST:     The alignment of the thoracic spine is within normal limits. Compression  deformities involving T6 and T7 are appreciated with associated marrow  edema consistent with acute to subacute fractures. Loss of vertical  height at T6 and T7 is estimated to be approximately 6 mm at each level.  There is no evidence of bony retropulsion. Concave deformities involving  the endplates from the superior endplate of T11 to the superior endplate  of L1 are appreciated without marrow edema. Schmorl's nodes are  appreciated involving the endplates from T8-L2. Signal intensity within  the cord is normal on the sagittal and axial T2 sequences. Mild to  moderate facet degenerative disease is present bilaterally at T10/T11.       Impression:      Acute to subacute compression fractures involving T6 and T7  are appreciated with loss of approximately 1 cm vertical height at each  level centrally. There is no evidence of cord signal abnormality, cord  compression or disc herniation. See above.     MRI EXAMINATION OF THE LUMBAR SPINE WITHOUT CONTRAST:     There is a grade 1/grade 2 anterolisthesis of L3 upon L4 estimated to be  approximately 7 mm. There is moderate to severe loss of disc height and  disc desiccation at L2-3, L3-L4 and L4-L5. The conus is at L1 and the  caudal aspect the spinal cord appears unremarkable. Concave deformities  involving the endplates are appreciated at L4 and to a lesser extent L1  and T12. There is no evidence of marrow edema to suggest an acute or  subacute component. Multiple Schmorl's nodes are appreciated.     T12-L1: A minimal central disc osteophyte complex is present with  evidence of herniation.     L1-L2: Mild foraminal stenosis is present on the left secondary to  extension of a small disc osteophyte complex into the neural foramen.     L2-L3: There is mild canal stenosis secondary to broad-based disc  osteophyte complex which is more  prominent to the right. There is mild  and mild to moderate lateral recess narrowing on the left and right  respectively. Mild foraminal stenosis is present bilaterally, more  prominent on the right secondary to extension of the disc osteophyte  complex into the neural foramen.     L3-L4: There is moderate canal stenosis and moderate lateral recess  narrowing bilaterally secondary to moderate facet and ligamentum flavum  hypertrophy and a moderate broad-based disc osteophyte complex. Mild to  moderate foraminal stenosis is appreciated bilaterally.     L4-L5: A mild broad-based disc osteophyte complex is present which  results in mild flattening of the ventral surface of the thecal sac.  Mild to moderate lateral recess narrowing is present bilaterally. Mild  to moderate foraminal stenosis is present on the right secondary to  extension of the disc osteophyte complex into the neural foramen. Mild  foraminal stenosis is present on the left secondary to facet  hypertrophy.     L5-S1: Mild to moderate facet degenerative disease is present  bilaterally. A mild central broad-based disc osteophyte complex is  present with zones of herniation.     IMPRESSION: There is no evidence of a focal herniation. Multilevel  degenerative disease involving lumbar spine is noted as described above  most prominent which is at L3-L4. There is moderate canal stenosis and  lateral recess narrowing bilaterally secondary to posterior element  degenerative disease, broad-based disc osteophyte complex and  anterolisthesis of L3 upon L4. Multilevel concave deformities (without  edema to suggest an acute or subacute component) involving the  thoracolumbar spine and multiple Schmorl's nodes are appreciated. There  is no evidence of an acute compression fracture.     This report was finalized on 12/19/2024 4:54 PM by Dr. Lon Velasquez M.D on Workstation: [id number]       MRI Lumbar Spine Without Contrast [[id number]] Collected: 12/19/24 4558      Updated: 12/19/24 8043    Narrative:      MRI THORACIC SPINE WO CONTRAST-, MRI LUMBAR SPINE WO CONTRAST-     HISTORY:  evaluate for acute compression fracture; back pain;  R10.9-Unspecified abdominal pain; J96.21-Acute and chronic respiratory  failure with hypoxia; K80.20-Calculus of gallbladder without  cholecystitis without obstruction; R93.5-Abnormal findings on diagnostic  imaging of other abdominal regions, including retroperitoneum;  M43.9-Deforming dorsopathy, unspecified     COMPARISON: No prior MRI examination of the thoracic or lumbar spine is  available for comparison     MRI EXAMINATION OF THE THORACIC SPINE WITHOUT CONTRAST:     The alignment of the thoracic spine is within normal limits. Compression  deformities involving T6 and T7 are appreciated with associated marrow  edema consistent with acute to subacute fractures. Loss of vertical  height at T6 and T7 is estimated to be approximately 6 mm at each level.  There is no evidence of bony retropulsion. Concave deformities involving  the endplates from the superior endplate of T11 to the superior endplate  of L1 are appreciated without marrow edema. Schmorl's nodes are  appreciated involving the endplates from T8-L2. Signal intensity within  the cord is normal on the sagittal and axial T2 sequences. Mild to  moderate facet degenerative disease is present bilaterally at T10/T11.       Impression:      Acute to subacute compression fractures involving T6 and T7  are appreciated with loss of approximately 1 cm vertical height at each  level centrally. There is no evidence of cord signal abnormality, cord  compression or disc herniation. See above.     MRI EXAMINATION OF THE LUMBAR SPINE WITHOUT CONTRAST:     There is a grade 1/grade 2 anterolisthesis of L3 upon L4 estimated to be  approximately 7 mm. There is moderate to severe loss of disc height and  disc desiccation at L2-3, L3-L4 and L4-L5. The conus is at L1 and the  caudal aspect the spinal cord  appears unremarkable. Concave deformities  involving the endplates are appreciated at L4 and to a lesser extent L1  and T12. There is no evidence of marrow edema to suggest an acute or  subacute component. Multiple Schmorl's nodes are appreciated.     T12-L1: A minimal central disc osteophyte complex is present with  evidence of herniation.     L1-L2: Mild foraminal stenosis is present on the left secondary to  extension of a small disc osteophyte complex into the neural foramen.     L2-L3: There is mild canal stenosis secondary to broad-based disc  osteophyte complex which is more prominent to the right. There is mild  and mild to moderate lateral recess narrowing on the left and right  respectively. Mild foraminal stenosis is present bilaterally, more  prominent on the right secondary to extension of the disc osteophyte  complex into the neural foramen.     L3-L4: There is moderate canal stenosis and moderate lateral recess  narrowing bilaterally secondary to moderate facet and ligamentum flavum  hypertrophy and a moderate broad-based disc osteophyte complex. Mild to  moderate foraminal stenosis is appreciated bilaterally.     L4-L5: A mild broad-based disc osteophyte complex is present which  results in mild flattening of the ventral surface of the thecal sac.  Mild to moderate lateral recess narrowing is present bilaterally. Mild  to moderate foraminal stenosis is present on the right secondary to  extension of the disc osteophyte complex into the neural foramen. Mild  foraminal stenosis is present on the left secondary to facet  hypertrophy.     L5-S1: Mild to moderate facet degenerative disease is present  bilaterally. A mild central broad-based disc osteophyte complex is  present with zones of herniation.     IMPRESSION: There is no evidence of a focal herniation. Multilevel  degenerative disease involving lumbar spine is noted as described above  most prominent which is at L3-L4. There is moderate canal  stenosis and  lateral recess narrowing bilaterally secondary to posterior element  degenerative disease, broad-based disc osteophyte complex and  anterolisthesis of L3 upon L4. Multilevel concave deformities (without  edema to suggest an acute or subacute component) involving the  thoracolumbar spine and multiple Schmorl's nodes are appreciated. There  is no evidence of an acute compression fracture.     This report was finalized on 12/19/2024 4:54 PM by Dr. Lon Velasquez M.D on Workstation: [id number]       CT Abdomen Pelvis With Contrast [[id number]] Collected: 12/16/24 1433     Updated: 12/16/24 1501    Narrative:      CT CHEST W CONTRAST DIAGNOSTIC-, CT ABDOMEN PELVIS W CONTRAST-     INDICATION: Rule out pneumonia. Shortness of air. Abdominal pain.     COMPARISON: CTA chest November 18, 2024 and CT abdomen pelvis November 18, 2024     TECHNIQUE:  Routine CT chest, abdomen and pelvis with IV contrast. Coronal and  sagittal reformats. Radiation dose reduction techniques were utilized,  including automated exposure control and exposure modulation based on  body size.     FINDINGS:      Chest wall: Ill-defined soft tissue seen adjacent to the inferior  scapula and chest wall, suspect fibroblastoma dorsi. No lymphadenopathy.     Mediastinum: Coronary artery atherosclerotic calcifications. Moderate to  severe cardiomegaly. No pericardial effusion. Calcified left hilar lymph  nodes, consistent with prior granulomatous infection. Right infrahilar  lymph node, series 2, axial mage 47, measures 1.4 cm, unchanged. No new  or enlarging lymph nodes identified.     Lungs/pleura: Trace bilateral pleural fluid. Occlusion of the superior  segmental bronchus left lower lobe, with adjacent calcified hilar lymph  nodes, unchanged. Airways wall thickening. Biapical pleural-parenchymal  thickening. Mild mosaic attenuation, suspect air trapping. Linear  opacities in the lower lobes, suspect subsegmental atelectasis or  scarring.  Calcified pulmonary nodule in the superior segment left lower  lobe, consistent with prior granulomatous infection. Multifocal  centrilobular and tree-in-bud nodules, similar to prior. For example,  centrilobular and tree-in-bud nodules in the apical upper lobes on  series 3, axial mage 23. Inferior right upper lobe peripheral opacity,  with volume loss and distortion. Bronchovascular right middle lobe  opacities, with volume loss and distortion. Small lingular opacity, with  volume loss and distortion. Suspect subsegmental airway impaction in the  anterior segment left upper lobe.     ABDOMEN: Hepatic steatosis. Calcifications in the liver and spleen,  consistent with prior granulomatous infection. Multiple fluid  attenuating hepatic cysts. Low attenuating liver lesions measuring less  than 1 cm too small to characterize, unchanged. Gallbladder full of  gallstones. No gallbladder wall thickening or surrounding inflammation.  No biliary ductal dilatation. Spleen is normal in size. No pancreatic  mass or pancreatic ductal dilatation seen. No adrenal nodules. Large  fluid attenuating cyst in the left mid kidney, measures 6.7 cm. No  solid-appearing renal mass or hydronephrosis.     Pelvis: Underdistended bladder. No bladder calculus. Hysterectomy. Left  ovarian cyst, series 2, axial mage 162, measures 2.4 cm, unchanged,  suspect benign.     Bowel: No obstruction. Colonic diverticulosis. Left colon and rectum are  collapsed. Appendix not identified though no secondary findings of  appendicitis.     Abdominal wall: Rectus diastases. Tiny fat-containing umbilical hernia.  Pelvic wall scarring.     Retroperitoneum: No lymphadenopathy.     Vasculature: Patent. Ectasia of the infrarenal abdominal aorta measuring  2.0 x 2.0 cm, unchanged. Severe aortoiliac atherosclerotic  calcification.     Osseous structures: Mild bilateral hip osteoarthritis. Lumbar facet  degenerative arthropathy with grade 1 anterolisthesis of L3 on L4.  Old  biconcave compression deformity at L4, stable. Old biconcave compression  deformities seen at T11 and T12, stable. Mild superior endplate  compression deformity at L1, stable. Superior plate compression  deformity at T7, with 50% collapse, previously 40% collapse, with  subchondral sclerosis beneath the superior endplate. Small biconcave  compression deformity seen at T6, with 20% collapse, new in the  interval.       Impression:         1. Airways disease with scattered multiple centrilobular and tree-in-bud  nodules, similar to prior. Suspect infectious or inflammatory  bronchiolitis. Chronic appearing right middle lobe and lingular  opacities, with volume loss and distortion, suspect scarring  2. Moderate to severe cardiomegaly, without edema.  3. Gallbladder full of gallstones.  4. Hepatic steatosis.  5. Colonic diverticulosis.  6. Multiple compression deformities in the thoracic and lumbar spine  with progressive collapse seen at T7 and new biconcave compression  deformity seen at T6 when compared to CTA chest November 18, 2024.     This report was finalized on 12/16/2024 2:58 PM by Dr. Tres Lopez M.D on Workstation: [id number]       CT Chest With Contrast Diagnostic [[id number]] Collected: 12/16/24 1433     Updated: 12/16/24 1501    Narrative:      CT CHEST W CONTRAST DIAGNOSTIC-, CT ABDOMEN PELVIS W CONTRAST-     INDICATION: Rule out pneumonia. Shortness of air. Abdominal pain.     COMPARISON: CTA chest November 18, 2024 and CT abdomen pelvis November 18, 2024     TECHNIQUE:  Routine CT chest, abdomen and pelvis with IV contrast. Coronal and  sagittal reformats. Radiation dose reduction techniques were utilized,  including automated exposure control and exposure modulation based on  body size.     FINDINGS:      Chest wall: Ill-defined soft tissue seen adjacent to the inferior  scapula and chest wall, suspect fibroblastoma dorsi. No lymphadenopathy.     Mediastinum: Coronary artery  atherosclerotic calcifications. Moderate to  severe cardiomegaly. No pericardial effusion. Calcified left hilar lymph  nodes, consistent with prior granulomatous infection. Right infrahilar  lymph node, series 2, axial mage 47, measures 1.4 cm, unchanged. No new  or enlarging lymph nodes identified.     Lungs/pleura: Trace bilateral pleural fluid. Occlusion of the superior  segmental bronchus left lower lobe, with adjacent calcified hilar lymph  nodes, unchanged. Airways wall thickening. Biapical pleural-parenchymal  thickening. Mild mosaic attenuation, suspect air trapping. Linear  opacities in the lower lobes, suspect subsegmental atelectasis or  scarring. Calcified pulmonary nodule in the superior segment left lower  lobe, consistent with prior granulomatous infection. Multifocal  centrilobular and tree-in-bud nodules, similar to prior. For example,  centrilobular and tree-in-bud nodules in the apical upper lobes on  series 3, axial mage 23. Inferior right upper lobe peripheral opacity,  with volume loss and distortion. Bronchovascular right middle lobe  opacities, with volume loss and distortion. Small lingular opacity, with  volume loss and distortion. Suspect subsegmental airway impaction in the  anterior segment left upper lobe.     ABDOMEN: Hepatic steatosis. Calcifications in the liver and spleen,  consistent with prior granulomatous infection. Multiple fluid  attenuating hepatic cysts. Low attenuating liver lesions measuring less  than 1 cm too small to characterize, unchanged. Gallbladder full of  gallstones. No gallbladder wall thickening or surrounding inflammation.  No biliary ductal dilatation. Spleen is normal in size. No pancreatic  mass or pancreatic ductal dilatation seen. No adrenal nodules. Large  fluid attenuating cyst in the left mid kidney, measures 6.7 cm. No  solid-appearing renal mass or hydronephrosis.     Pelvis: Underdistended bladder. No bladder calculus. Hysterectomy. Left  ovarian  cyst, series 2, axial mage 162, measures 2.4 cm, unchanged,  suspect benign.     Bowel: No obstruction. Colonic diverticulosis. Left colon and rectum are  collapsed. Appendix not identified though no secondary findings of  appendicitis.     Abdominal wall: Rectus diastases. Tiny fat-containing umbilical hernia.  Pelvic wall scarring.     Retroperitoneum: No lymphadenopathy.     Vasculature: Patent. Ectasia of the infrarenal abdominal aorta measuring  2.0 x 2.0 cm, unchanged. Severe aortoiliac atherosclerotic  calcification.     Osseous structures: Mild bilateral hip osteoarthritis. Lumbar facet  degenerative arthropathy with grade 1 anterolisthesis of L3 on L4. Old  biconcave compression deformity at L4, stable. Old biconcave compression  deformities seen at T11 and T12, stable. Mild superior endplate  compression deformity at L1, stable. Superior plate compression  deformity at T7, with 50% collapse, previously 40% collapse, with  subchondral sclerosis beneath the superior endplate. Small biconcave  compression deformity seen at T6, with 20% collapse, new in the  interval.       Impression:         1. Airways disease with scattered multiple centrilobular and tree-in-bud  nodules, similar to prior. Suspect infectious or inflammatory  bronchiolitis. Chronic appearing right middle lobe and lingular  opacities, with volume loss and distortion, suspect scarring  2. Moderate to severe cardiomegaly, without edema.  3. Gallbladder full of gallstones.  4. Hepatic steatosis.  5. Colonic diverticulosis.  6. Multiple compression deformities in the thoracic and lumbar spine  with progressive collapse seen at T7 and new biconcave compression  deformity seen at T6 when compared to CTA chest November 18, 2024.     This report was finalized on 12/16/2024 2:58 PM by Dr. Tres Lopez M.D on Workstation: [id number]       XR Chest 1 View [[id number]] Collected: 12/16/24 1301     Updated: 12/16/24 1305    Narrative:      XR CHEST  "1 VW-     HISTORY: Female who is 89 years-old, hypoxia     TECHNIQUE: Frontal views of the chest     COMPARISON: 8/14/2024     FINDINGS: The heart is enlarged. Aorta is tortuous, calcified. Pulmonary  vasculature is mildly congested. Linear likely scarring or atelectasis  at the right midlung. Small right basilar atelectasis or infiltrate,  there may be minimal right pleural effusion. No pneumothorax. No acute  osseous process.         Impression:      As described.     This report was finalized on 12/16/2024 1:02 PM by Dr. Steven Garza M.D on Workstation: [id number]               Pertinent Labs     Results from last 7 days   Lab Units 12/20/24  0504 12/19/24  0538 12/18/24  0517 12/17/24  0506   WBC 10*3/mm3 13.93* 13.22* 15.60* 9.70   HEMOGLOBIN g/dL 10.2* 9.6* 12.4 11.3*   PLATELETS 10*3/mm3 287 294 261 263     Results from last 7 days   Lab Units 12/20/24  0504 12/19/24  0538 12/18/24  0517 12/17/24  0506   SODIUM mmol/L 141 140 142 142   POTASSIUM mmol/L 3.6 3.5 3.7 4.4   CHLORIDE mmol/L 101 104 102 104   CO2 mmol/L 31.0* 28.8 27.4 27.5   BUN mg/dL 18 24* 23 15   CREATININE mg/dL 0.72 0.87 0.78 0.97   GLUCOSE mg/dL 114* 93 107* 158*   Estimated Creatinine Clearance: 42 mL/min (by C-G formula based on SCr of 0.72 mg/dL).  Results from last 7 days   Lab Units 12/16/24  1241   ALBUMIN g/dL 3.7   BILIRUBIN mg/dL 0.4   ALK PHOS U/L 117   AST (SGOT) U/L 27   ALT (SGPT) U/L 22     Results from last 7 days   Lab Units 12/20/24  0504 12/19/24  0538 12/18/24  0517 12/17/24  0506 12/16/24  1241   CALCIUM mg/dL 9.1 9.3 9.3 9.4 9.5   ALBUMIN g/dL  --   --   --   --  3.7     Results from last 7 days   Lab Units 12/16/24  1241   LIPASE U/L 28     Results from last 7 days   Lab Units 12/16/24  1434 12/16/24  1241   HSTROP T ng/L 31* 37*   PROBNP pg/mL  --  5,193.0*           Invalid input(s): \"LDLCALC\"  Results from last 7 days   Lab Units 12/16/24  2140 12/16/24  2134   BLOODCX  No growth at 4 days No growth at 4 days "       Test Results Pending at Discharge     Pending Labs       Order Current Status    Blood Culture - Blood, Arm, Left Preliminary result    Blood Culture - Blood, Arm, Right Preliminary result            Discharge Details        Discharge Medications        New Medications        Instructions Start Date   HYDROcodone-acetaminophen 5-325 MG per tablet  Commonly known as: NORCO   Take 0.5 tablets by mouth Every 6 (Six) Hours As Needed for Moderate Pain.             Changes to Medications        Instructions Start Date   Eliquis 2.5 MG tablet tablet  Generic drug: apixaban  What changed: See the new instructions.   TAKE 1 TABLET EVERY 12 HOURS, FULL ANTICOAGULATION      FeroSul 325 (65 Fe) MG tablet  Generic drug: ferrous sulfate  What changed:   how much to take  when to take this   TAKE ONE TABLET BY MOUTH DAILY WITH BREAKFAST             Continue These Medications        Instructions Start Date   acetaminophen 325 MG tablet  Commonly known as: TYLENOL   2 tablets, Every 4 Hours PRN      albuterol sulfate  (90 Base) MCG/ACT inhaler  Commonly known as: PROVENTIL HFA;VENTOLIN HFA;PROAIR HFA   2 puffs, Inhalation, Every 6 Hours PRN      azithromycin 250 MG tablet  Commonly known as: ZITHROMAX   250 mg, Daily      benzonatate 200 MG capsule  Commonly known as: TESSALON   1 capsule, 3 Times Daily PRN      bumetanide 2 MG tablet  Commonly known as: BUMEX   2 mg, Oral, Daily   Start Date: December 22, 2024     carvedilol 3.125 MG tablet  Commonly known as: COREG   3.125 mg, Oral, 2 Times Daily With Meals      digoxin 125 MCG tablet  Commonly known as: LANOXIN   125 mcg, Oral, Every Other Day      fexofenadine 180 MG tablet  Commonly known as: ALLEGRA   1 tablet, Daily      fluticasone-salmeterol 115-21 MCG/ACT inhaler  Commonly known as: ADVAIR HFA   2 puffs, 2 Times Daily - RT      guaiFENesin 600 MG 12 hr tablet  Commonly known as: MUCINEX   1 tablet, 2 Times Daily      ipratropium-albuterol 0.5-2.5 mg/3 ml  nebulizer  Commonly known as: DUO-NEB   3 mL, Daily      multivitamin with minerals tablet tablet   1 tablet, 2 Times Daily      O2  Commonly known as: OXYGEN   2 L/min, Nightly      potassium chloride 20 MEQ CR tablet  Commonly known as: KLOR-CON M20   20 mEq, Oral, Daily      predniSONE 10 MG tablet  Commonly known as: DELTASONE   1 tablet, Daily      sertraline 25 MG tablet  Commonly known as: ZOLOFT   1 tablet, Daily      sodium chloride 7 % nebulizer solution nebulizer solution   4 mL, Nebulization, 2 Times Daily               Allergies   Allergen Reactions    Amlodipine Besylate Swelling    Aspirin GI Intolerance     Caused bleeding ulcers    Bactrim [Sulfamethoxazole-Trimethoprim] Nausea And Vomiting    Erythromycin Unknown (See Comments)     Patient does not recall type of reaction.    Levaquin [Levofloxacin] Unknown (See Comments)     Nausea      Nitrofurantoin Nausea Only and Nausea And Vomiting    Ramipril Other (See Comments)     Cough         Discharge Disposition:  Home or Self Care    Discharge Diet:  Diet Order   Procedures    Diet: Cardiac; Healthy Heart (2-3 Na+); Fluid Consistency: Thin (IDDSI 0)       Discharge Activity:   Activity Instructions       Activity as Tolerated              CODE STATUS:    Code Status and Medical Interventions: No CPR (Do Not Attempt to Resuscitate); Limited Support; No intubation (DNI)   Ordered at: 12/16/24 1831     Medical Intervention Limits:    No intubation (DNI)     Code Status (Patient has no pulse and is not breathing):    No CPR (Do Not Attempt to Resuscitate)     Medical Interventions (Patient has pulse or is breathing):    Limited Support       Future Appointments   Date Time Provider Department Center   1/30/2025 10:15 AM [facility]   1/30/2025 10:30 AM Luana Sahne MD [facility]     Additional Instructions for the Follow-ups that You Need to Schedule       Discharge Follow-up with PCP   As directed       Currently Documented  PCP:    Matt Rose MD    PCP Phone Number:    [phone]     Follow Up Details: post-hospital follow up        Discharge Follow-up with Specified Provider: Neurosurgery as directed- 6 weeks with X rays; 6 Weeks   As directed      To: Neurosurgery as directed- 6 weeks with X rays   Follow Up: 6 Weeks        XR Spine Thoracic 3 View   Jan 31, 2025      Exam reason: T6 and T7 VCF   Release to patient: Routine Release               Contact information for follow-up providers       Archana Fregoso MD Follow up in 1 week(s).    Specialty: General Surgery  Why: Please call to confirm your follow up appointment.  Contact information:  [address]  [facility] [address]  [phone]               Matt Rose MD .    Specialty: Family Medicine  Why: post-hospital follow up  Contact information:  [address]  [facility] [id number] [phone]                       Contact information for after-discharge care       Durable Medical Equipment       [facility] .    Service: Durable Medical Equipment  Contact information:  [address]  [place] [address]  [phone]                                   Additional Instructions for the Follow-ups that You Need to Schedule       Discharge Follow-up with PCP   As directed       Currently Documented PCP:    Matt Rose MD    PCP Phone Number:    [phone]     Follow Up Details: post-hospital follow up        Discharge Follow-up with Specified Provider: Neurosurgery as directed- 6 weeks with X rays; 6 Weeks   As directed      To: Neurosurgery as directed- 6 weeks with X rays   Follow Up: 6 Weeks        XR Spine Thoracic 3 View   Jan 31, 2025      Exam reason: T6 and T7 VCF   Release to patient: Routine Release            Time Spent on Discharge:  I spent greater than 30 minutes on this discharge activity which included: face-to-face encounter with the patient, reviewing the data in the system,  coordination of the care with the nursing staff as well as consultants, documentation, and entering orders.       Kiara Zhao MD  [facility]  12/21/24  12:50 EST

## 2024-12-21 NOTE — OUTREACH NOTE
Prep Survey      Flowsheet Row Responses   [facility] patient discharged from? [facility] [place]   Is LACE score < 7 ? 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 Other   Does the patient have Home health ordered? No   Is there a DME ordered? Yes   What DME was ordered? Oxygen--Carreon's and TLSO brace   Comments regarding appointments [facility]  ,Discharge Follow-up with Specified Provider: Neurosurgery as directed- 6 weeks with X rays,  6 Weeks,    XR Spine Thoracic 3 View   Complete by: Jan 31, 2025   Medication alerts for this patient Norco   Prep survey completed? Yes            Cammy HUBBARD - Registered Nurse

## 2024-12-21 NOTE — PLAN OF CARE
Problem: Adult Inpatient Plan of Care  Goal: Plan of Care Review  Flowsheets (Taken 12/21/2024 0321)  Progress: no change  Outcome Evaluation: No change. VSS. AOx1 to self. Pleasntly confused. No c/o pain or discomfort. Resting comfrotably @ this time. O2 in use @ 2L via NC. Call light within reach.  Plan of Care Reviewed With: patient  Goal: Absence of Hospital-Acquired Illness or Injury  Intervention: Identify and Manage Fall Risk  Flowsheets  Taken 12/21/2024 0220  Safety Promotion/Fall Prevention:   activity supervised   assistive device/personal items within reach   clutter free environment maintained   fall prevention program maintained   lighting adjusted   nonskid shoes/slippers when out of bed   room organization consistent   safety round/check completed  Taken 12/21/2024 0005  Safety Promotion/Fall Prevention:   activity supervised   assistive device/personal items within reach   clutter free environment maintained   fall prevention program maintained   lighting adjusted   nonskid shoes/slippers when out of bed   room organization consistent   safety round/check completed  Taken 12/20/2024 2248  Safety Promotion/Fall Prevention:   activity supervised   assistive device/personal items within reach   clutter free environment maintained   fall prevention program maintained   lighting adjusted   nonskid shoes/slippers when out of bed   room organization consistent   safety round/check completed  Taken 12/20/2024 2014  Safety Promotion/Fall Prevention:   activity supervised   assistive device/personal items within reach   clutter free environment maintained   fall prevention program maintained   lighting adjusted   nonskid shoes/slippers when out of bed   room organization consistent   safety round/check completed  Intervention: Prevent Skin Injury  Flowsheets  Taken 12/21/2024 0220  Body Position: position changed independently  Taken 12/21/2024 0005  Body Position: position changed independently  Taken  12/20/2024 2248  Body Position: position changed independently  Taken 12/20/2024 2014  Body Position: position changed independently  Skin Protection:   incontinence pads utilized   transparent dressing maintained  Intervention: Prevent Infection  Flowsheets (Taken 12/21/2024 0135 by Noemi Gu, PCT)  Infection Prevention:   environmental surveillance performed   rest/sleep promoted   single patient room provided  Goal: Optimal Comfort and Wellbeing  Intervention: Monitor Pain and Promote Comfort  Flowsheets (Taken 12/20/2024 0033 by Tennille Benavidez, RN)  Pain Management Interventions:   pain medication given   pillow support provided   position adjusted  Intervention: Provide Person-Centered Care  Flowsheets (Taken 12/20/2024 2014)  Trust Relationship/Rapport:   care explained   choices provided   emotional support provided   empathic listening provided   questions answered   questions encouraged   reassurance provided   thoughts/feelings acknowledged  Goal: Readiness for Transition of Care  Intervention: Mutually Develop Transition Plan  Flowsheets (Taken 12/17/2024 1350 by Karine Shea, RN)  Equipment Needed After Discharge: none  Equipment Currently Used at Home: (02 at night only thru [place] with tank)   oxygen   wheelchair   rollator   shower chair   grab bar  Anticipated Changes Related to Illness: none  Transportation Anticipated: family or friend will provide  Outpatient/Agency/Support Group Needs: (facility provides therapy services)   outpatient therapy   other (see comments)  Transportation Concerns: none  Concerns to be Addressed: adjustment to diagnosis/illness  Readmission Within the Last 30 Days: no previous admission in last 30 days  Patient/Family Anticipated Services at Transition: home health care  Patient/Family Anticipates Transition to:   home   home with help/services     Problem: Comorbidity Management  Goal: Maintenance of Asthma Control  Intervention: Maintain Asthma Symptom  Control  Flowsheets (Taken 12/20/2024 2014)  Medication Review/Management:   medications reviewed   high-risk medications identified  Goal: Maintenance of Heart Failure Symptom Control  Intervention: Maintain Heart Failure Management  Flowsheets (Taken 12/20/2024 2014)  Medication Review/Management:   medications reviewed   high-risk medications identified  Goal: Blood Pressure in Desired Range  Intervention: Maintain Blood Pressure Management  Flowsheets (Taken 12/20/2024 2014)  Medication Review/Management:   medications reviewed   high-risk medications identified     Problem: Skin Injury Risk Increased  Goal: Skin Health and Integrity  Intervention: Optimize Skin Protection  Recent Flowsheet Documentation  Taken 12/21/2024 0220 by Armando Wilhelm, RN  Activity Management: activity encouraged  Head of Bed (HOB) Positioning: HOB elevated  Taken 12/21/2024 0005 by Armando Wilhelm, RN  Activity Management: activity encouraged  Head of Bed (HOB) Positioning: HOB elevated  Taken 12/20/2024 2248 by Armando Wilhelm, RN  Activity Management: activity encouraged  Head of Bed (HOB) Positioning: HOB elevated  Taken 12/20/2024 2014 by Armando Wilhelm, RN  Activity Management: activity encouraged  Pressure Reduction Techniques:   frequent weight shift encouraged   weight shift assistance provided  Head of Bed (HOB) Positioning:   HOB elevated   HOB at 20-30 degrees  Pressure Reduction Devices: positioning supports utilized  Skin Protection:   incontinence pads utilized   transparent dressing maintained     Problem: Fall Injury Risk  Goal: Absence of Fall and Fall-Related Injury  Intervention: Identify and Manage Contributors  Flowsheets (Taken 12/20/2024 2014)  Medication Review/Management:   medications reviewed   high-risk medications identified  Intervention: Promote Injury-Free Environment  Flowsheets  Taken 12/21/2024 0220  Safety Promotion/Fall Prevention:   activity supervised   assistive device/personal items within reach   clutter free  environment maintained   fall prevention program maintained   lighting adjusted   nonskid shoes/slippers when out of bed   room organization consistent   safety round/check completed  Taken 12/21/2024 0005  Safety Promotion/Fall Prevention:   activity supervised   assistive device/personal items within reach   clutter free environment maintained   fall prevention program maintained   lighting adjusted   nonskid shoes/slippers when out of bed   room organization consistent   safety round/check completed  Taken 12/20/2024 2248  Safety Promotion/Fall Prevention:   activity supervised   assistive device/personal items within reach   clutter free environment maintained   fall prevention program maintained   lighting adjusted   nonskid shoes/slippers when out of bed   room organization consistent   safety round/check completed  Taken 12/20/2024 2014  Safety Promotion/Fall Prevention:   activity supervised   assistive device/personal items within reach   clutter free environment maintained   fall prevention program maintained   lighting adjusted   nonskid shoes/slippers when out of bed   room organization consistent   safety round/check completed   Goal Outcome Evaluation:  Plan of Care Reviewed With: patient        Progress: no change  Outcome Evaluation: No change. VSS. AOx1 to self. Pleasntly confused. No c/o pain or discomfort. Resting comfrotably @ this time. O2 in use @ 2L via NC. Call light within reach.

## 2024-12-23 ENCOUNTER — TRANSITIONAL CARE MANAGEMENT TELEPHONE ENCOUNTER (OUTPATIENT)
Dept: CALL CENTER | Facility: HOSPITAL | Age: 89
End: 2024-12-23
Payer: MEDICARE

## 2024-12-23 ENCOUNTER — READMISSION MANAGEMENT (OUTPATIENT)
Dept: CALL CENTER | Facility: HOSPITAL | Age: 89
End: 2024-12-23
Payer: MEDICARE

## 2024-12-23 ENCOUNTER — HOSPITAL ENCOUNTER (INPATIENT)
Facility: HOSPITAL | Age: 89
LOS: 3 days | Discharge: HOSPICE | End: 2024-12-27
Attending: EMERGENCY MEDICINE | Admitting: INTERNAL MEDICINE
Payer: MEDICARE

## 2024-12-23 ENCOUNTER — APPOINTMENT (OUTPATIENT)
Dept: GENERAL RADIOLOGY | Facility: HOSPITAL | Age: 89
End: 2024-12-23
Payer: MEDICARE

## 2024-12-23 DIAGNOSIS — M80.88XD OSTEOPOROTIC COMPRESSION FRACTURE OF VERTEBRA WITH ROUTINE HEALING, SUBSEQUENT ENCOUNTER: ICD-10-CM

## 2024-12-23 DIAGNOSIS — J96.01 ACUTE RESPIRATORY FAILURE WITH HYPOXIA: Primary | ICD-10-CM

## 2024-12-23 DIAGNOSIS — I50.32 CHRONIC DIASTOLIC CHF (CONGESTIVE HEART FAILURE): ICD-10-CM

## 2024-12-23 DIAGNOSIS — J81.0 ACUTE PULMONARY EDEMA: ICD-10-CM

## 2024-12-23 DIAGNOSIS — J96.21 ACUTE ON CHRONIC RESPIRATORY FAILURE WITH HYPOXIA: ICD-10-CM

## 2024-12-23 DIAGNOSIS — R79.89 ELEVATED BRAIN NATRIURETIC PEPTIDE (BNP) LEVEL: ICD-10-CM

## 2024-12-23 DIAGNOSIS — J18.9 MULTIFOCAL PNEUMONIA: ICD-10-CM

## 2024-12-23 PROBLEM — E87.70 FLUID OVERLOAD: Status: ACTIVE | Noted: 2024-12-23

## 2024-12-23 LAB
ALBUMIN SERPL-MCNC: 3.6 G/DL (ref 3.5–5.2)
ALBUMIN/GLOB SERPL: 1.2 G/DL
ALP SERPL-CCNC: 141 U/L (ref 39–117)
ALT SERPL W P-5'-P-CCNC: 41 U/L (ref 1–33)
ANION GAP SERPL CALCULATED.3IONS-SCNC: 13 MMOL/L (ref 5–15)
APTT PPP: 28.5 SECONDS (ref 22.7–35.4)
ARTERIAL PATENCY WRIST A: POSITIVE
AST SERPL-CCNC: 33 U/L (ref 1–32)
ATMOSPHERIC PRESS: 755 MMHG
B PARAPERT DNA SPEC QL NAA+PROBE: NOT DETECTED
B PERT DNA SPEC QL NAA+PROBE: NOT DETECTED
BASE EXCESS BLDA CALC-SCNC: 6.9 MMOL/L (ref 0–2)
BDY SITE: ABNORMAL
BILIRUB SERPL-MCNC: 0.6 MG/DL (ref 0–1.2)
BUN SERPL-MCNC: 13 MG/DL (ref 8–23)
BUN/CREAT SERPL: 16.9 (ref 7–25)
C PNEUM DNA NPH QL NAA+NON-PROBE: NOT DETECTED
CALCIUM SPEC-SCNC: 9.2 MG/DL (ref 8.2–9.6)
CHLORIDE SERPL-SCNC: 95 MMOL/L (ref 98–107)
CO2 BLDA-SCNC: >32.45 MMOL/L (ref 23–27)
CO2 SERPL-SCNC: 30 MMOL/L (ref 22–29)
CREAT SERPL-MCNC: 0.77 MG/DL (ref 0.57–1)
D-LACTATE SERPL-SCNC: 1.6 MMOL/L (ref 0.5–2)
DEPRECATED RDW RBC AUTO: 49.2 FL (ref 37–54)
DEVICE COMMENT: ABNORMAL
DIGOXIN SERPL-MCNC: <0.3 NG/ML (ref 0.6–1.2)
EGFRCR SERPLBLD CKD-EPI 2021: 71.6 ML/MIN/1.73
ERYTHROCYTE [DISTWIDTH] IN BLOOD BY AUTOMATED COUNT: 13.8 % (ref 12.3–15.4)
FLUAV SUBTYP SPEC NAA+PROBE: NOT DETECTED
FLUBV RNA ISLT QL NAA+PROBE: NOT DETECTED
GAS FLOW AIRWAY: 4 LPM
GEN 5 1HR TROPONIN T REFLEX: 43 NG/L
GLOBULIN UR ELPH-MCNC: 3.1 GM/DL
GLUCOSE SERPL-MCNC: 139 MG/DL (ref 65–99)
HADV DNA SPEC NAA+PROBE: NOT DETECTED
HCO3 BLDA-SCNC: 31.6 MMOL/L (ref 22–28)
HCOV 229E RNA SPEC QL NAA+PROBE: NOT DETECTED
HCOV HKU1 RNA SPEC QL NAA+PROBE: NOT DETECTED
HCOV NL63 RNA SPEC QL NAA+PROBE: NOT DETECTED
HCOV OC43 RNA SPEC QL NAA+PROBE: NOT DETECTED
HCT VFR BLD AUTO: 33.2 % (ref 34–46.6)
HEMODILUTION: NO
HGB BLD-MCNC: 10.8 G/DL (ref 12–15.9)
HMPV RNA NPH QL NAA+NON-PROBE: NOT DETECTED
HPIV1 RNA ISLT QL NAA+PROBE: NOT DETECTED
HPIV2 RNA SPEC QL NAA+PROBE: NOT DETECTED
HPIV3 RNA NPH QL NAA+PROBE: NOT DETECTED
HPIV4 P GENE NPH QL NAA+PROBE: NOT DETECTED
INR PPP: 1.28 (ref 0.9–1.1)
LYMPHOCYTES # BLD MANUAL: 3.93 10*3/MM3 (ref 0.7–3.1)
LYMPHOCYTES NFR BLD MANUAL: 10 % (ref 5–12)
M PNEUMO IGG SER IA-ACNC: NOT DETECTED
MAGNESIUM SERPL-MCNC: 2.3 MG/DL (ref 1.7–2.3)
MCH RBC QN AUTO: 32.2 PG (ref 26.6–33)
MCHC RBC AUTO-ENTMCNC: 32.5 G/DL (ref 31.5–35.7)
MCV RBC AUTO: 99.1 FL (ref 79–97)
MODALITY: ABNORMAL
MONOCYTES # BLD: 1.64 10*3/MM3 (ref 0.1–0.9)
NEUTROPHILS # BLD AUTO: 10.81 10*3/MM3 (ref 1.7–7)
NEUTROPHILS NFR BLD MANUAL: 66 % (ref 42.7–76)
NRBC BLD AUTO-RTO: 2.6 /100 WBC (ref 0–0.2)
NT-PROBNP SERPL-MCNC: 3843 PG/ML (ref 0–1800)
PCO2 BLDA: 44.5 MM HG (ref 35–45)
PH BLDA: 7.46 PH UNITS (ref 7.35–7.45)
PHOSPHATE SERPL-MCNC: 2.8 MG/DL (ref 2.5–4.5)
PLAT MORPH BLD: NORMAL
PLATELET # BLD AUTO: 226 10*3/MM3 (ref 140–450)
PMV BLD AUTO: 11.1 FL (ref 6–12)
PO2 BLDA: 55.2 MM HG (ref 80–100)
POTASSIUM SERPL-SCNC: 3.5 MMOL/L (ref 3.5–5.2)
PROCALCITONIN SERPL-MCNC: 0.1 NG/ML (ref 0–0.25)
PROT SERPL-MCNC: 6.7 G/DL (ref 6–8.5)
PROTHROMBIN TIME: 16.2 SECONDS (ref 11.7–14.2)
QT INTERVAL: 300 MS
QTC INTERVAL: 399 MS
RBC # BLD AUTO: 3.35 10*6/MM3 (ref 3.77–5.28)
RBC MORPH BLD: NORMAL
RHINOVIRUS RNA SPEC NAA+PROBE: NOT DETECTED
RSV RNA NPH QL NAA+NON-PROBE: NOT DETECTED
SAO2 % BLDCOA: 89.6 % (ref 92–98.5)
SARS-COV-2 RNA NPH QL NAA+NON-PROBE: NOT DETECTED
SODIUM SERPL-SCNC: 138 MMOL/L (ref 136–145)
TOTAL RATE: 20 BREATHS/MINUTE
TROPONIN T % DELTA: 5 %
TROPONIN T NUMERIC DELTA: 2 NG/L
TROPONIN T SERPL HS-MCNC: 41 NG/L
VARIANT LYMPHS NFR BLD MANUAL: 24 % (ref 19.6–45.3)
WBC MORPH BLD: NORMAL
WBC NRBC COR # BLD AUTO: 16.38 10*3/MM3 (ref 3.4–10.8)

## 2024-12-23 PROCEDURE — 85025 COMPLETE CBC W/AUTO DIFF WBC: CPT | Performed by: EMERGENCY MEDICINE

## 2024-12-23 PROCEDURE — 36600 WITHDRAWAL OF ARTERIAL BLOOD: CPT | Performed by: EMERGENCY MEDICINE

## 2024-12-23 PROCEDURE — 84484 ASSAY OF TROPONIN QUANT: CPT | Performed by: EMERGENCY MEDICINE

## 2024-12-23 PROCEDURE — 87040 BLOOD CULTURE FOR BACTERIA: CPT | Performed by: EMERGENCY MEDICINE

## 2024-12-23 PROCEDURE — 36415 COLL VENOUS BLD VENIPUNCTURE: CPT | Performed by: EMERGENCY MEDICINE

## 2024-12-23 PROCEDURE — 93010 ELECTROCARDIOGRAM REPORT: CPT | Performed by: INTERNAL MEDICINE

## 2024-12-23 PROCEDURE — 84100 ASSAY OF PHOSPHORUS: CPT | Performed by: EMERGENCY MEDICINE

## 2024-12-23 PROCEDURE — 94799 UNLISTED PULMONARY SVC/PX: CPT

## 2024-12-23 PROCEDURE — 80053 COMPREHEN METABOLIC PANEL: CPT | Performed by: EMERGENCY MEDICINE

## 2024-12-23 PROCEDURE — 83735 ASSAY OF MAGNESIUM: CPT | Performed by: EMERGENCY MEDICINE

## 2024-12-23 PROCEDURE — 0202U NFCT DS 22 TRGT SARS-COV-2: CPT | Performed by: EMERGENCY MEDICINE

## 2024-12-23 PROCEDURE — G0378 HOSPITAL OBSERVATION PER HR: HCPCS

## 2024-12-23 PROCEDURE — 84145 PROCALCITONIN (PCT): CPT | Performed by: EMERGENCY MEDICINE

## 2024-12-23 PROCEDURE — 25010000002 AMPICILLIN-SULBACTAM PER 1.5 G: Performed by: EMERGENCY MEDICINE

## 2024-12-23 PROCEDURE — 71045 X-RAY EXAM CHEST 1 VIEW: CPT

## 2024-12-23 PROCEDURE — 85007 BL SMEAR W/DIFF WBC COUNT: CPT | Performed by: EMERGENCY MEDICINE

## 2024-12-23 PROCEDURE — 83880 ASSAY OF NATRIURETIC PEPTIDE: CPT | Performed by: EMERGENCY MEDICINE

## 2024-12-23 PROCEDURE — 83605 ASSAY OF LACTIC ACID: CPT | Performed by: EMERGENCY MEDICINE

## 2024-12-23 PROCEDURE — 85730 THROMBOPLASTIN TIME PARTIAL: CPT | Performed by: EMERGENCY MEDICINE

## 2024-12-23 PROCEDURE — 25010000002 METHYLPREDNISOLONE PER 40 MG: Performed by: INTERNAL MEDICINE

## 2024-12-23 PROCEDURE — 25010000002 BUMETANIDE PER 0.5 MG: Performed by: EMERGENCY MEDICINE

## 2024-12-23 PROCEDURE — 80162 ASSAY OF DIGOXIN TOTAL: CPT | Performed by: EMERGENCY MEDICINE

## 2024-12-23 PROCEDURE — 93005 ELECTROCARDIOGRAM TRACING: CPT | Performed by: EMERGENCY MEDICINE

## 2024-12-23 PROCEDURE — 25010000002 CEFTRIAXONE PER 250 MG: Performed by: INTERNAL MEDICINE

## 2024-12-23 PROCEDURE — 82803 BLOOD GASES ANY COMBINATION: CPT | Performed by: EMERGENCY MEDICINE

## 2024-12-23 PROCEDURE — 99291 CRITICAL CARE FIRST HOUR: CPT

## 2024-12-23 PROCEDURE — 85610 PROTHROMBIN TIME: CPT | Performed by: EMERGENCY MEDICINE

## 2024-12-23 RX ORDER — HYDROCODONE BITARTRATE AND ACETAMINOPHEN 5; 325 MG/1; MG/1
1 TABLET ORAL EVERY 6 HOURS PRN
Status: DISCONTINUED | OUTPATIENT
Start: 2024-12-23 | End: 2024-12-27 | Stop reason: HOSPADM

## 2024-12-23 RX ORDER — ACETAMINOPHEN 650 MG/1
650 SUPPOSITORY RECTAL EVERY 4 HOURS PRN
Status: DISCONTINUED | OUTPATIENT
Start: 2024-12-23 | End: 2024-12-27 | Stop reason: HOSPADM

## 2024-12-23 RX ORDER — ONDANSETRON 2 MG/ML
4 INJECTION INTRAMUSCULAR; INTRAVENOUS EVERY 6 HOURS PRN
Status: DISCONTINUED | OUTPATIENT
Start: 2024-12-23 | End: 2024-12-27 | Stop reason: HOSPADM

## 2024-12-23 RX ORDER — BUMETANIDE 0.25 MG/ML
1 INJECTION, SOLUTION INTRAMUSCULAR; INTRAVENOUS ONCE
Status: COMPLETED | OUTPATIENT
Start: 2024-12-23 | End: 2024-12-23

## 2024-12-23 RX ORDER — GUAIFENESIN 600 MG/1
600 TABLET, EXTENDED RELEASE ORAL 2 TIMES DAILY
Status: DISCONTINUED | OUTPATIENT
Start: 2024-12-23 | End: 2024-12-27 | Stop reason: HOSPADM

## 2024-12-23 RX ORDER — BISACODYL 10 MG
10 SUPPOSITORY, RECTAL RECTAL DAILY PRN
Status: DISCONTINUED | OUTPATIENT
Start: 2024-12-23 | End: 2024-12-27 | Stop reason: HOSPADM

## 2024-12-23 RX ORDER — ACETAMINOPHEN 160 MG/5ML
650 SOLUTION ORAL EVERY 4 HOURS PRN
Status: DISCONTINUED | OUTPATIENT
Start: 2024-12-23 | End: 2024-12-27 | Stop reason: HOSPADM

## 2024-12-23 RX ORDER — MULTIPLE VITAMINS W/ MINERALS TAB 9MG-400MCG
1 TAB ORAL 2 TIMES DAILY
Status: DISCONTINUED | OUTPATIENT
Start: 2024-12-23 | End: 2024-12-27 | Stop reason: HOSPADM

## 2024-12-23 RX ORDER — DIGOXIN 125 MCG
125 TABLET ORAL EVERY OTHER DAY
Qty: 45 TABLET | Refills: 2 | Status: SHIPPED | OUTPATIENT
Start: 2024-12-23

## 2024-12-23 RX ORDER — POLYETHYLENE GLYCOL 3350 17 G/17G
17 POWDER, FOR SOLUTION ORAL DAILY PRN
Status: DISCONTINUED | OUTPATIENT
Start: 2024-12-23 | End: 2024-12-27 | Stop reason: HOSPADM

## 2024-12-23 RX ORDER — IPRATROPIUM BROMIDE AND ALBUTEROL SULFATE 2.5; .5 MG/3ML; MG/3ML
3 SOLUTION RESPIRATORY (INHALATION) ONCE
Status: COMPLETED | OUTPATIENT
Start: 2024-12-23 | End: 2024-12-23

## 2024-12-23 RX ORDER — CARVEDILOL 3.12 MG/1
3.12 TABLET ORAL 2 TIMES DAILY WITH MEALS
Status: DISCONTINUED | OUTPATIENT
Start: 2024-12-23 | End: 2024-12-24

## 2024-12-23 RX ORDER — DOXYCYCLINE 100 MG/1
100 CAPSULE ORAL EVERY 12 HOURS SCHEDULED
Status: DISCONTINUED | OUTPATIENT
Start: 2024-12-23 | End: 2024-12-27 | Stop reason: HOSPADM

## 2024-12-23 RX ORDER — POTASSIUM CHLORIDE 750 MG/1
20 TABLET, FILM COATED, EXTENDED RELEASE ORAL DAILY
Status: DISCONTINUED | OUTPATIENT
Start: 2024-12-24 | End: 2024-12-27 | Stop reason: HOSPADM

## 2024-12-23 RX ORDER — SERTRALINE HYDROCHLORIDE 25 MG/1
25 TABLET, FILM COATED ORAL DAILY
Status: DISCONTINUED | OUTPATIENT
Start: 2024-12-24 | End: 2024-12-27 | Stop reason: HOSPADM

## 2024-12-23 RX ORDER — ACETAMINOPHEN 325 MG/1
650 TABLET ORAL EVERY 4 HOURS PRN
Status: DISCONTINUED | OUTPATIENT
Start: 2024-12-23 | End: 2024-12-27 | Stop reason: HOSPADM

## 2024-12-23 RX ORDER — IPRATROPIUM BROMIDE AND ALBUTEROL SULFATE 2.5; .5 MG/3ML; MG/3ML
3 SOLUTION RESPIRATORY (INHALATION)
Status: DISCONTINUED | OUTPATIENT
Start: 2024-12-23 | End: 2024-12-27 | Stop reason: HOSPADM

## 2024-12-23 RX ORDER — BENZONATATE 100 MG/1
200 CAPSULE ORAL 3 TIMES DAILY PRN
Status: DISCONTINUED | OUTPATIENT
Start: 2024-12-23 | End: 2024-12-27 | Stop reason: HOSPADM

## 2024-12-23 RX ORDER — METHYLPREDNISOLONE SODIUM SUCCINATE 40 MG/ML
20 INJECTION, POWDER, LYOPHILIZED, FOR SOLUTION INTRAMUSCULAR; INTRAVENOUS EVERY 8 HOURS
Status: COMPLETED | OUTPATIENT
Start: 2024-12-23 | End: 2024-12-25

## 2024-12-23 RX ORDER — DIGOXIN 125 MCG
125 TABLET ORAL EVERY OTHER DAY
Status: DISCONTINUED | OUTPATIENT
Start: 2024-12-24 | End: 2024-12-27 | Stop reason: HOSPADM

## 2024-12-23 RX ORDER — BUDESONIDE AND FORMOTEROL FUMARATE DIHYDRATE 160; 4.5 UG/1; UG/1
2 AEROSOL RESPIRATORY (INHALATION)
Status: DISCONTINUED | OUTPATIENT
Start: 2024-12-23 | End: 2024-12-27 | Stop reason: HOSPADM

## 2024-12-23 RX ORDER — CARVEDILOL 3.12 MG/1
3.12 TABLET ORAL 2 TIMES DAILY WITH MEALS
Qty: 180 TABLET | Refills: 3 | Status: ON HOLD | OUTPATIENT
Start: 2024-12-23 | End: 2024-12-27

## 2024-12-23 RX ORDER — ONDANSETRON 4 MG/1
4 TABLET, ORALLY DISINTEGRATING ORAL EVERY 6 HOURS PRN
Status: DISCONTINUED | OUTPATIENT
Start: 2024-12-23 | End: 2024-12-27 | Stop reason: HOSPADM

## 2024-12-23 RX ORDER — AMOXICILLIN 250 MG
2 CAPSULE ORAL 2 TIMES DAILY PRN
Status: DISCONTINUED | OUTPATIENT
Start: 2024-12-23 | End: 2024-12-27 | Stop reason: HOSPADM

## 2024-12-23 RX ORDER — BISACODYL 5 MG/1
5 TABLET, DELAYED RELEASE ORAL DAILY PRN
Status: DISCONTINUED | OUTPATIENT
Start: 2024-12-23 | End: 2024-12-27 | Stop reason: HOSPADM

## 2024-12-23 RX ORDER — SODIUM CHLORIDE FOR INHALATION 7 %
4 VIAL, NEBULIZER (ML) INHALATION 2 TIMES DAILY
Status: DISCONTINUED | OUTPATIENT
Start: 2024-12-23 | End: 2024-12-27 | Stop reason: HOSPADM

## 2024-12-23 RX ORDER — FERROUS SULFATE 325(65) MG
325 TABLET ORAL DAILY
Status: DISCONTINUED | OUTPATIENT
Start: 2024-12-23 | End: 2024-12-27 | Stop reason: HOSPADM

## 2024-12-23 RX ORDER — FUROSEMIDE 10 MG/ML
40 INJECTION INTRAMUSCULAR; INTRAVENOUS EVERY 12 HOURS
Status: DISCONTINUED | OUTPATIENT
Start: 2024-12-24 | End: 2024-12-26

## 2024-12-23 RX ORDER — ALBUTEROL SULFATE 0.83 MG/ML
2.5 SOLUTION RESPIRATORY (INHALATION) EVERY 6 HOURS PRN
Status: DISCONTINUED | OUTPATIENT
Start: 2024-12-23 | End: 2024-12-27 | Stop reason: HOSPADM

## 2024-12-23 RX ADMIN — CEFTRIAXONE SODIUM 1000 MG: 1 INJECTION, POWDER, FOR SOLUTION INTRAMUSCULAR; INTRAVENOUS at 21:07

## 2024-12-23 RX ADMIN — Medication 4 ML: at 21:21

## 2024-12-23 RX ADMIN — DOXYCYCLINE 100 MG: 100 CAPSULE ORAL at 22:02

## 2024-12-23 RX ADMIN — IPRATROPIUM BROMIDE AND ALBUTEROL SULFATE 3 ML: 2.5; .5 SOLUTION RESPIRATORY (INHALATION) at 14:16

## 2024-12-23 RX ADMIN — BUMETANIDE 1 MG: 0.25 INJECTION INTRAMUSCULAR; INTRAVENOUS at 16:04

## 2024-12-23 RX ADMIN — BUDESONIDE AND FORMOTEROL FUMARATE DIHYDRATE 2 PUFF: 160; 4.5 AEROSOL RESPIRATORY (INHALATION) at 21:21

## 2024-12-23 RX ADMIN — METHYLPREDNISOLONE SODIUM SUCCINATE 20 MG: 40 INJECTION, POWDER, FOR SOLUTION INTRAMUSCULAR; INTRAVENOUS at 21:13

## 2024-12-23 RX ADMIN — AMPICILLIN SODIUM AND SULBACTAM SODIUM 3 G: 2; 1 INJECTION, POWDER, FOR SOLUTION INTRAMUSCULAR; INTRAVENOUS at 16:04

## 2024-12-23 RX ADMIN — APIXABAN 2.5 MG: 2.5 TABLET, FILM COATED ORAL at 22:01

## 2024-12-23 RX ADMIN — Medication 1 TABLET: at 22:03

## 2024-12-23 RX ADMIN — GUAIFENESIN 600 MG: 600 TABLET, MULTILAYER, EXTENDED RELEASE ORAL at 22:03

## 2024-12-23 RX ADMIN — DOXYCYCLINE 100 MG: 100 INJECTION, POWDER, LYOPHILIZED, FOR SOLUTION INTRAVENOUS at 16:52

## 2024-12-23 RX ADMIN — FERROUS SULFATE TAB 325 MG (65 MG ELEMENTAL FE) 325 MG: 325 (65 FE) TAB at 22:02

## 2024-12-23 RX ADMIN — CARVEDILOL 3.12 MG: 3.12 TABLET, FILM COATED ORAL at 22:01

## 2024-12-23 RX ADMIN — HYDROCODONE BITARTRATE AND ACETAMINOPHEN 1 TABLET: 5; 325 TABLET ORAL at 16:03

## 2024-12-23 RX ADMIN — IPRATROPIUM BROMIDE AND ALBUTEROL SULFATE 3 ML: 2.5; .5 SOLUTION RESPIRATORY (INHALATION) at 21:21

## 2024-12-23 NOTE — LETTER
EMS Transport Request  For use at [facility], [place], [place], [place], and [place] only   Patient Name: Agnieszka Rizzo : 1930   Weight:54 kg (119 lb 1.6 oz) Pick-up Location: [facility], [place] BLS/ALS: BLS/ALS: BLS   Insurance: MEDICARE Auth End Date:    Pre-Cert #:na D/C Summary complete:    Destination: Other [facility]   Contact Precautions: None   Equipment (O2, Fluids, etc.): None   Arrive By Date/Time:  after 2 pm  Stretcher/WC: Wheelchair   CM Requesting: Karine Shea RN Ext: [phone]   Notes/Medical Necessity: wheelchair van      ______________________________________________________________________    *Only 2 patient bags OR 1 carry-on size bag are permitted.  Wheelchairs and walkers CANNOT transported with the patient. Acknowledge: Yes

## 2024-12-23 NOTE — OUTREACH NOTE
Call Center TCM Note      Flowsheet Row Responses   [facility] patient discharged from? [place]   Does the patient have one of the following disease processes/diagnoses(primary or secondary)? Other   TCM attempt successful? No   Unsuccessful attempts Attempt 1            Leyla Ashley RN    12/23/2024, 09:39 EST

## 2024-12-23 NOTE — ED NOTES
Nursing report ED to floor  Agnieszka Rzizo  89 y.o.  female    HPI :  HPI  Stated Reason for Visit: SOA    Chief Complaint  Chief Complaint   Patient presents with    Shortness of Breath       Admitting doctor:   Carlene Mooney MD    Admitting diagnosis:   The primary encounter diagnosis was Acute respiratory failure with hypoxia. Diagnoses of Multifocal pneumonia, Acute pulmonary edema, and Elevated brain natriuretic peptide (BNP) level were also pertinent to this visit.    Code status:   Current Code Status       Date Active Code Status Order ID Comments User Context       12/23/2024 1552 No CPR (Do Not Attempt to Resuscitate) [id number]  Rohan Daniels MD ED        Question Answer    Code Status (Patient has no pulse and is not breathing) No CPR (Do Not Attempt to Resuscitate)    Medical Interventions (Patient has pulse or is breathing) Full Support    Level Of Support Discussed With Patient     Next of Kin (If No Surrogate)                    Allergies:   Amlodipine besylate, Aspirin, Bactrim [sulfamethoxazole-trimethoprim], Erythromycin, Levaquin [levofloxacin], Nitrofurantoin, and Ramipril    Isolation:   No active isolations    Intake and Output  No intake or output data in the 24 hours ending 12/23/24 1630    Weight:   There were no vitals filed for this visit.    Most recent vitals:   Vitals:    12/23/24 1401 12/23/24 1430 12/23/24 1431 12/23/24 1501   BP: 145/85  127/84 137/75   Pulse: 105 112  102   Resp:       Temp:       SpO2: 92% 97%  94%       Active LDAs/IV Access:   Lines, Drains & Airways       Active LDAs       Name Placement date Placement time Site Days    Peripheral IV 12/23/24 1319 Anterior;Left;Proximal Forearm 12/23/24  1319  Forearm  less than 1    External Urinary Catheter 12/23/24  1409  --  less than 1                    Labs (abnormal labs have a star):   Labs Reviewed   COMPREHENSIVE METABOLIC PANEL - Abnormal; Notable for the following components:       Result Value    Glucose  139 (*)     Chloride 95 (*)     CO2 30.0 (*)     ALT (SGPT) 41 (*)     AST (SGOT) 33 (*)     Alkaline Phosphatase 141 (*)     All other components within normal limits    Narrative:     GFR Categories in Chronic Kidney Disease (CKD)      GFR Category          GFR (mL/min/1.73)    Interpretation  G1                     90 or greater         Normal or high (1)  G2                      60-89                Mild decrease (1)  G3a                   45-59                Mild to moderate decrease  G3b                   30-44                Moderate to severe decrease  G4                    15-29                Severe decrease  G5                    14 or less           Kidney failure          (1)In the absence of evidence of kidney disease, neither GFR category G1 or G2 fulfill the criteria for CKD.    eGFR calculation 2021 CKD-EPI creatinine equation, which does not include race as a factor   BNP (IN-HOUSE) - Abnormal; Notable for the following components:    proBNP 3,843.0 (*)     All other components within normal limits    Narrative:     This assay is used as an aid in the diagnosis of individuals suspected of having heart failure. It can be used as an aid in the diagnosis of acute decompensated heart failure (ADHF) in patients presenting with signs and symptoms of ADHF to the emergency department (ED). In addition, NT-proBNP of <300 pg/mL indicates ADHF is not likely.    Age Range Result Interpretation  NT-proBNP Concentration (pg/mL:      <50             Positive            >450                   Gray                 300-450                    Negative             <300    50-75           Positive            >900                  Gray                300-900                  Negative            <300      >75             Positive            >1800                  Gray                300-1800                  Negative            <300   TROPONIN - Abnormal; Notable for the following components:    HS Troponin T 41 (*)      All other components within normal limits    Narrative:     High Sensitive Troponin T Reference Range:  <14.0 ng/L- Negative Female for AMI  <22.0 ng/L- Negative Male for AMI  >=14 - Abnormal Female indicating possible myocardial injury.  >=22 - Abnormal Male indicating possible myocardial injury.   Clinicians would have to utilize clinical acumen, EKG, Troponin, and serial changes to determine if it is an Acute Myocardial Infarction or myocardial injury due to an underlying chronic condition.        PROTIME-INR - Abnormal; Notable for the following components:    Protime 16.2 (*)     INR 1.28 (*)     All other components within normal limits   DIGOXIN LEVEL - Abnormal; Notable for the following components:    Digoxin <0.30 (*)     All other components within normal limits   CBC WITH AUTO DIFFERENTIAL - Abnormal; Notable for the following components:    WBC 16.38 (*)     RBC 3.35 (*)     Hemoglobin 10.8 (*)     Hematocrit 33.2 (*)     MCV 99.1 (*)     nRBC 2.6 (*)     All other components within normal limits   BLOOD GAS, ARTERIAL - Abnormal; Notable for the following components:    pH, Arterial 7.460 (*)     pO2, Arterial 55.2 (*)     HCO3, Arterial 31.6 (*)     Base Excess, Arterial 6.9 (*)     O2 Saturation, Arterial 89.6 (*)     CO2 Content >32.45 (*)     All other components within normal limits   HIGH SENSITIVITIY TROPONIN T 1HR - Abnormal; Notable for the following components:    HS Troponin T 43 (*)     All other components within normal limits    Narrative:     High Sensitive Troponin T Reference Range:  <14.0 ng/L- Negative Female for AMI  <22.0 ng/L- Negative Male for AMI  >=14 - Abnormal Female indicating possible myocardial injury.  >=22 - Abnormal Male indicating possible myocardial injury.   Clinicians would have to utilize clinical acumen, EKG, Troponin, and serial changes to determine if it is an Acute Myocardial Infarction or myocardial injury due to an underlying chronic condition.        MANUAL  "DIFFERENTIAL - Abnormal; Notable for the following components:    Neutrophils Absolute 10.81 (*)     Lymphocytes Absolute 3.93 (*)     Monocytes Absolute 1.64 (*)     All other components within normal limits   RESPIRATORY PANEL PCR W/ COVID-19 (SARS-COV-2), NP SWAB IN UTM/VTP, 2 HR TAT - Normal    Narrative:     In the setting of a positive respiratory panel with a viral infection PLUS a negative procalcitonin without other underlying concern for bacterial infection, consider observing off antibiotics or discontinuation of antibiotics and continue supportive care. If the respiratory panel is positive for atypical bacterial infection (Bordetella pertussis, Chlamydophila pneumoniae, or Mycoplasma pneumoniae), consider antibiotic de-escalation to target atypical bacterial infection.   MAGNESIUM - Normal   PHOSPHORUS - Normal   LACTIC ACID, PLASMA - Normal   APTT - Normal   PROCALCITONIN - Normal    Narrative:     As a Marker for Sepsis (Non-Neonates):    1. <0.5 ng/mL represents a low risk of severe sepsis and/or septic shock.  2. >2 ng/mL represents a high risk of severe sepsis and/or septic shock.    As a Marker for Lower Respiratory Tract Infections that require antibiotic therapy:    PCT on Admission    Antibiotic Therapy       6-12 Hrs later    >0.5                Strongly Recommended  >0.25 - <0.5        Recommended   0.1 - 0.25          Discouraged              Remeasure/reassess PCT  <0.1                Strongly Discouraged     Remeasure/reassess PCT    As 28 day mortality risk marker: \"Change in Procalcitonin Result\" (>80% or <=80%) if Day 0 (or Day 1) and Day 4 values are available. Refer to http://www.[facility]-pct-calculator.com    Change in PCT <=80%  A decrease of PCT levels below or equal to 80% defines a positive change in PCT test result representing a higher risk for 28-day all-cause mortality of patients diagnosed with severe sepsis for septic shock.    Change in PCT >80%  A decrease of PCT levels of " more than 80% defines a negative change in PCT result representing a lower risk for 28-day all-cause mortality of patients diagnosed with severe sepsis or septic shock.      BLOOD CULTURE   BLOOD CULTURE   CBC AND DIFFERENTIAL    Narrative:     The following orders were created for panel order CBC & Differential.  Procedure                               Abnormality         Status                     ---------                               -----------         ------                     CBC Auto Differential[[id number]]        Abnormal            Final result                 Please view results for these tests on the individual orders.       EKG:   ECG 12 Lead Dyspnea   Final Result   HEART OJSM=433  bpm   RR Mvsiszla=057  ms   PA Interval=  ms   P Horizontal Axis=  deg   P Front Axis=  deg   QRSD Interval=82  ms   QT Kyrcovhu=664  ms   XHkO=493  ms   QRS Axis=-3  deg   T Wave Axis=179  deg   - ABNORMAL ECG -   Atrial fibrillation   Abnormal R-wave progression, early transition   Repol abnrm, severe global ischemia (LM/MVD)   Rate has increased   Electronically Signed By: Anayeli Christopher ([facility]) 2024-12-23 15:28:18   Date and Time of Study:2024-12-23 14:26:25          Meds given in ED:   Medications   ampicillin-sulbactam (UNASYN) 3 g in sodium chloride 0.9 % 100 mL MBP (3 g Intravenous New Bag 12/23/24 1604)   doxycycline (VIBRAMYCIN) 100 mg in sodium chloride 0.9 % 100 mL MBP (has no administration in time range)   HYDROcodone-acetaminophen (NORCO) 5-325 MG per tablet 1 tablet (1 tablet Oral Given 12/23/24 1603)   ipratropium-albuterol (DUO-NEB) nebulizer solution 3 mL (3 mL Nebulization Given 12/23/24 1416)   bumetanide (BUMEX) injection 1 mg (1 mg Intravenous Given 12/23/24 1604)       Imaging results:  XR Chest 1 View    Result Date: 12/23/2024  As described.  This report was finalized on 12/23/2024 2:59 PM by Dr. Steven Garza M.D on Workstation: [id number]       Ambulatory status:   - bedrest    Social issues:    Social History     Socioeconomic History    Marital status:    Tobacco Use    Smoking status: Never    Smokeless tobacco: Never    Tobacco comments:     caffiene daily   Vaping Use    Vaping status: Never Used   Substance and Sexual Activity    Alcohol use: Not Currently     Alcohol/week: 2.0 standard drinks of alcohol     Types: 1 Glasses of wine, 1 Shots of liquor per week    Drug use: No    Sexual activity: Defer     Partners: Male       Peripheral Neurovascular  Peripheral Neurovascular (Adult)  Peripheral Neurovascular WDL: WDL    Neuro Cognitive  Neuro Cognitive (Adult)  Cognitive/Neuro/Behavioral WDL: .WDL except, arousability, speech, orientation  Arousal Level: arouses to repeated stimulation  Orientation: disoriented to, situation, place  Speech: garbled    Learning  Learning Assessment  Learning Readiness and Ability: cognitive limitation noted    Respiratory  Respiratory  Airway WDL: WDL  Respiratory WDL  Respiratory WDL: .WDL except, all  Rhythm/Pattern, Respiratory: shortness of breath, shallow, tachypneic, labored  Cough Frequency: frequent  Cough Type: congested, no productive sputum    Abdominal Pain       Pain Assessments  Pain (Adult)  (0-10) Pain Rating: Rest: 5  (0-10) Pain Rating: Activity: 5    NIH Stroke Scale       Shikha Whitney RN  12/23/24 16:30 EST

## 2024-12-23 NOTE — CASE MANAGEMENT/SOCIAL WORK
Case Management Discharge Note      Final Note: home no needs         Selected Continued Care - Discharged on 12/21/2024 Admission date: 12/16/2024 - Discharge disposition: Home or Self Care      Destination    No services have been selected for the patient.                Durable Medical Equipment       Service Provider Services Address Phone Fax Patient Preferred    [facility] [address] [address] [phone] --              Dialysis/Infusion    No services have been selected for the patient.                Home Medical Care    No services have been selected for the patient.                Therapy    No services have been selected for the patient.                Community Resources    No services have been selected for the patient.                Community & DME    No services have been selected for the patient.                    Transportation Services  Private: Car    Final Discharge Disposition Code: 01 - home or self-care

## 2024-12-23 NOTE — OUTREACH NOTE
Call Center TCM Note      Flowsheet Row Responses   [facility], [place], operated by [facility] patient discharged from? [facility]   Does the patient have one of the following disease processes/diagnoses(primary or secondary)? Other   TCM attempt successful? 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 Yes   Is the patient having any side effects they believe may be caused by any medication additions or changes? No   Does the patient have all medications ordered at discharge? Yes   Is the patient taking all medications as directed (includes completed medication regime)? Yes   Comments 12/27/2024 11:00 AM   Does the patient have an appointment with their PCP within 7-14 days of discharge? Yes   Has home health visited the patient within 72 hours of discharge? N/A   Psychosocial issues? No   Did the patient receive a copy of their discharge instructions? Yes   Nursing interventions Reviewed instructions with patient   What is the patient's perception of their health status since discharge? Same   TCM call completed? Yes   Wrap up additional comments Pt still has cough and lives in senior living facility. Advised t monitir and if gets worse to notify PCP or take to hospital. PCP appt  made.   Call end time 1130            Leyla Ashley RN    12/23/2024, 11:31 EST

## 2024-12-23 NOTE — ED PROVIDER NOTES
EMERGENCY DEPARTMENT ENCOUNTER  Room Number:  40/40  PCP: Matt Rose MD  Independent Historians: Patient  Date of encounter:  12/23/2024  Patient Care Team:  Matt Rose MD as PCP - General (Family Medicine)  Marcie Robbins MD as Cardiologist (Cardiology)  Nilesh Danielle MD as Consulting Physician (Urology)  Lina Morris RPH as Pharmacist  Lev Mckeon PharmD as Pharmacist (Pharmacy)  Rogelio Tucker MD as Consulting Physician (Pulmonary Disease)     HPI:  Chief Complaint: had concerns including Shortness of Breath.     A complete HPI/ROS/PMH/PSH/SH/FH are unobtainable due to: None    Chronic or social conditions impacting patient care (Social Determinants of Health): None      Context: Agnieszka Rizzo is a 89 y.o. female with a medical history of hypertension, atrial fibrillation, anxiety, COPD, CHF, pulmonary hypertension who presents to the ED c/o acute shortness of breath.  Patient has had 1 week of the shortness of breath and a nonproductive cough.  No associated fevers or chills.  No chest pain.  No leg swelling.  No nausea, vomiting, myalgias or headache.  Patient does not use any supplemental oxygen at baseline.    Review of prior external notes (non-ED) -and- Review of prior external test results outside of this encounter:  Review of patient's records, she uses 2 L of supplemental oxygen only at night.  Echocardiogram from 10/5/2023 reviewed.  Patient had EF of 56%.  There was moderate mitral and tricuspid regurgitation noted.  Small pericardial effusion without evidence of tamponade.    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    Permanent atrial fibrillation 06/09/2017    Anxiety 06/20/2017    Medicare annual wellness visit, subsequent 04/18/2019    Herpes infection 11/19/2019    Abnormal EKG 12/16/2019    Alteration in anticoagulation     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    COPD (chronic obstructive pulmonary disease)     Chronic diastolic CHF (congestive heart failure) 11/27/2023    Orthostasis 07/30/2024    Nonrheumatic tricuspid valve regurgitation 07/30/2024    Pulmonary hypertension 07/30/2024    Pericardial effusion 07/30/2024    Fracture of greater trochanter of left femur 08/14/2024    Leukocytosis 11/18/2024    Anemia 11/18/2024    Infectious disorder of bronchus 11/18/2024    Rhinovirus infection 11/19/2024    Calculus of gallbladder without cholecystitis without obstruction 11/19/2024    Abdominal pain 12/16/2024    Osteoporotic compression fracture of thoracic spine 12/17/2024     Resolved Ambulatory Problems     Diagnosis Date Noted    Pneumothorax of right lung after biopsy 11/12/2016    Pulmonary aspergillosis 11/16/2016    Heart failure, diastolic, with acute decompensation 12/01/2016    Persistent atrial fibrillation 12/01/2016    Pneumonia 01/01/2017    Pneumonia with the fungal infection aspergillosis 01/06/2017    Acid-fast bacteria present 01/09/2017    Hyponatremia 02/08/2017    C. difficile colitis 03/11/2017    Sepsis 03/12/2017    Acute on chronic diastolic heart failure 03/12/2017    CHF (congestive heart failure) 03/22/2017    Pancolitis 04/02/2017    Pneumonia of both lungs due to infectious organism 05/31/2017    Exposure to hepatitis A 04/20/2018    Abdominal pain 09/23/2019    Moderate asthma with acute exacerbation 12/02/2019    Bronchitis, acute, with bronchospasm 12/16/2019    Acute bronchitis due to  parainfluenza virus 12/16/2019    COPD with acute exacerbation  12/16/2019    UTI (urinary tract infection) 12/19/2019    Fall     Laceration of left upper extremity 07/16/2021    Multiple skin tears 07/16/2021    Clinical diagnosis of COVID-19 01/04/2022    Pneumonia of right lung due to infectious organism, unspecified part of lung 07/09/2022    Dizziness 09/09/2022    Bronchiectasis 02/20/2023    Sepsis due to pneumonia 04/03/2023    Acute pulmonary edema 08/04/2023    Electrolyte imbalance 08/16/2023    Acute on chronic diastolic congestive heart failure 10/04/2023    Acute on chronic diastolic heart failure 10/05/2023    CHF (congestive heart failure) 11/26/2023    Syncope and collapse 07/10/2024    Abdominal pain 11/18/2024     Past Medical History:   Diagnosis Date    Aspergillus     Asthma     Atrial fibrillation     Atrial flutter     C. difficile diarrhea 03/11/2017    CAD (coronary artery disease)     Colitis     Cough     Cryoglobulinemia     Dyspnea on exertion     Hyperlipidemia     Hypertension     Hypoxia     Infectious viral hepatitis     Left shoulder pain     Leg swelling     Lesion of lung     Malignant hyperthermia due to anesthesia     Mild tricuspid regurgitation     MR (mitral regurgitation)     MVP (mitral valve prolapse)     Pneumothorax     SOB (shortness of breath)     Syncope 07/2024    Wheeze        PAST SURGICAL HISTORY  Past Surgical History:   Procedure Laterality Date    BRONCHOSCOPY N/A 11/12/2016    Procedure: BRONCHOSCOPY WITH FLUORO, BRUSHINGS, BAL, AND BIOPSIES;  Surgeon: Rogelio Tucker MD;  Location: [facility] ENDOSCOPY;  Service:     BRONCHOSCOPY Bilateral 06/03/2017    Procedure: BRONCHOSCOPY with BAL ;  Surgeon: Sung King MD;  Location: [facility] ENDOSCOPY;  Service:     BRONCHOSCOPY N/A 12/17/2019    Procedure: BRONCHOSCOPY WITH WASHINGS;  Surgeon: Rogelio Tucker MD;  Location: [facility] ENDOSCOPY;  Service: Pulmonary    BRONCHOSCOPY N/A 07/15/2022    Procedure: BRONCHOSCOPY;   Surgeon: Rogelio Tucker MD;  Location: [facility] ENDOSCOPY;  Service: Pulmonary;  Laterality: N/A;  Pre: Pneumonia  Post: Pneumonia    BRONCHOSCOPY N/A 10/2/2023    Procedure: BRONCHOSCOPY WITH BRONCHIAL AVEOLAR LAVAGE;  Surgeon: Rogelio Tucker MD;  Location: [facility] ENDOSCOPY;  Service: Pulmonary;  Laterality: N/A;  PRE:BRONCHIECTASIS /   POST: SAME    CATARACT EXTRACTION EXTRACAPSULAR W/ INTRAOCULAR LENS IMPLANTATION      COLONOSCOPY      2013    D & C WITH SUCTION      HYSTERECTOMY      KNEE ARTHROSCOPY Left     Partial       FAMILY HISTORY  Family History   Problem Relation Age of Onset    Hypertension Mother     Stroke Mother     Heart disease Father     Hypertension Father     Cancer Brother     Cancer Son        SOCIAL HISTORY  Social History     Socioeconomic History    Marital status:    Tobacco Use    Smoking status: Never    Smokeless tobacco: Never    Tobacco comments:     caffiene daily   Vaping Use    Vaping status: Never Used   Substance and Sexual Activity    Alcohol use: Not Currently     Alcohol/week: 2.0 standard drinks of alcohol     Types: 1 Glasses of wine, 1 Shots of liquor per week    Drug use: No    Sexual activity: Defer     Partners: Male       ALLERGIES  Amlodipine besylate, Aspirin, Bactrim [sulfamethoxazole-trimethoprim], Erythromycin, Levaquin [levofloxacin], Nitrofurantoin, and Ramipril    REVIEW OF SYSTEMS  Review of Systems  Included in HPI  All systems reviewed and negative except for those discussed in HPI.    PHYSICAL EXAM    I have reviewed the triage vital signs and nursing notes.    ED Triage Vitals [12/23/24 1320]   Temp Heart Rate Resp BP SpO2   99.9 °F (37.7 °C) 118 22 153/83 94 %      Temp src Heart Rate Source Patient Position BP Location FiO2 (%)   -- -- -- -- --       Physical Exam  Constitutional:       General: She is not in acute distress.     Appearance: Normal appearance. She is ill-appearing. She is not toxic-appearing.   HENT:      Head: Normocephalic and  atraumatic.      Nose: Nose normal.      Mouth/Throat:      Mouth: Mucous membranes are moist.      Pharynx: Oropharynx is clear.   Eyes:      Extraocular Movements: Extraocular movements intact.      Conjunctiva/sclera: Conjunctivae normal.      Pupils: Pupils are equal, round, and reactive to light.   Cardiovascular:      Rate and Rhythm: Normal rate and regular rhythm.      Pulses: Normal pulses.      Heart sounds: Normal heart sounds. No murmur heard.     No friction rub. No gallop.   Pulmonary:      Effort: Pulmonary effort is normal. Tachypnea present. No respiratory distress.      Breath sounds: No stridor. Rales (Diffuse) present. No wheezing or rhonchi.      Comments: On 4 L nasal cannula  Abdominal:      General: Abdomen is flat. There is no distension.      Palpations: Abdomen is soft.      Tenderness: There is no abdominal tenderness. There is no guarding or rebound.   Musculoskeletal:      Cervical back: Normal range of motion and neck supple.      Right lower leg: No edema.      Left lower leg: No edema.   Skin:     General: Skin is warm and dry.   Neurological:      General: No focal deficit present.      Mental Status: She is alert and oriented to person, place, and time. Mental status is at baseline.   Psychiatric:         Mood and Affect: Mood normal.         Behavior: Behavior normal.         Thought Content: Thought content normal.         Judgment: Judgment normal.         LAB RESULTS  Recent Results (from the past 24 hours)   Comprehensive Metabolic Panel    Collection Time: 12/23/24  1:58 PM    Specimen: Blood   Result Value Ref Range    Glucose 139 (H) 65 - 99 mg/dL    BUN 13 8 - 23 mg/dL    Creatinine 0.77 0.57 - 1.00 mg/dL    Sodium 138 136 - 145 mmol/L    Potassium 3.5 3.5 - 5.2 mmol/L    Chloride 95 (L) 98 - 107 mmol/L    CO2 30.0 (H) 22.0 - 29.0 mmol/L    Calcium 9.2 8.2 - 9.6 mg/dL    Total Protein 6.7 6.0 - 8.5 g/dL    Albumin 3.6 3.5 - 5.2 g/dL    ALT (SGPT) 41 (H) 1 - 33 U/L    AST  (SGOT) 33 (H) 1 - 32 U/L    Alkaline Phosphatase 141 (H) 39 - 117 U/L    Total Bilirubin 0.6 0.0 - 1.2 mg/dL    Globulin 3.1 gm/dL    A/G Ratio 1.2 g/dL    BUN/Creatinine Ratio 16.9 7.0 - 25.0    Anion Gap 13.0 5.0 - 15.0 mmol/L    eGFR 71.6 >60.0 mL/min/1.73   BNP    Collection Time: 12/23/24  1:58 PM    Specimen: Blood   Result Value Ref Range    proBNP 3,843.0 (H) 0.0 - 1,800.0 pg/mL   High Sensitivity Troponin T    Collection Time: 12/23/24  1:58 PM    Specimen: Blood   Result Value Ref Range    HS Troponin T 41 (H) <14 ng/L   Magnesium    Collection Time: 12/23/24  1:58 PM    Specimen: Blood   Result Value Ref Range    Magnesium 2.3 1.7 - 2.3 mg/dL   Phosphorus    Collection Time: 12/23/24  1:58 PM    Specimen: Blood   Result Value Ref Range    Phosphorus 2.8 2.5 - 4.5 mg/dL   Lactic Acid, Plasma    Collection Time: 12/23/24  1:58 PM    Specimen: Blood   Result Value Ref Range    Lactate 1.6 0.5 - 2.0 mmol/L   Protime-INR    Collection Time: 12/23/24  1:58 PM    Specimen: Blood   Result Value Ref Range    Protime 16.2 (H) 11.7 - 14.2 Seconds    INR 1.28 (H) 0.90 - 1.10   aPTT    Collection Time: 12/23/24  1:58 PM    Specimen: Blood   Result Value Ref Range    PTT 28.5 22.7 - 35.4 seconds   Digoxin Level    Collection Time: 12/23/24  1:58 PM    Specimen: Blood   Result Value Ref Range    Digoxin <0.30 (L) 0.60 - 1.20 ng/mL   CBC Auto Differential    Collection Time: 12/23/24  1:58 PM    Specimen: Blood   Result Value Ref Range    WBC 16.38 (H) 3.40 - 10.80 10*3/mm3    RBC 3.35 (L) 3.77 - 5.28 10*6/mm3    Hemoglobin 10.8 (L) 12.0 - 15.9 g/dL    Hematocrit 33.2 (L) 34.0 - 46.6 %    MCV 99.1 (H) 79.0 - 97.0 fL    MCH 32.2 26.6 - 33.0 pg    MCHC 32.5 31.5 - 35.7 g/dL    RDW 13.8 12.3 - 15.4 %    RDW-SD 49.2 37.0 - 54.0 fl    MPV 11.1 6.0 - 12.0 fL    Platelets 226 140 - 450 10*3/mm3    nRBC 2.6 (H) 0.0 - 0.2 /100 WBC   Procalcitonin    Collection Time: 12/23/24  1:58 PM    Specimen: Blood   Result Value Ref Range     Procalcitonin 0.10 0.00 - 0.25 ng/mL   Manual Differential    Collection Time: 12/23/24  1:58 PM    Specimen: Blood   Result Value Ref Range    Neutrophil % 66.0 42.7 - 76.0 %    Lymphocyte % 24.0 19.6 - 45.3 %    Monocyte % 10.0 5.0 - 12.0 %    Neutrophils Absolute 10.81 (H) 1.70 - 7.00 10*3/mm3    Lymphocytes Absolute 3.93 (H) 0.70 - 3.10 10*3/mm3    Monocytes Absolute 1.64 (H) 0.10 - 0.90 10*3/mm3    RBC Morphology Normal Normal    WBC Morphology Normal Normal    Platelet Morphology Normal Normal   Respiratory Panel PCR w/COVID-19(SARS-CoV-2) ANAI/JORGE/THOM/PAD/COR/NIKHIL In-House, NP Swab in [facility], 2 HR TAT - Swab, Nasopharynx    Collection Time: 12/23/24  2:03 PM    Specimen: Nasopharynx; Swab   Result Value Ref Range    ADENOVIRUS, PCR Not Detected Not Detected    Coronavirus 229E Not Detected Not Detected    Coronavirus HKU1 Not Detected Not Detected    Coronavirus NL63 Not Detected Not Detected    Coronavirus OC43 Not Detected Not Detected    COVID19 Not Detected Not Detected - Ref. Range    Human Metapneumovirus Not Detected Not Detected    Human Rhinovirus/Enterovirus Not Detected Not Detected    Influenza A PCR Not Detected Not Detected    Influenza B PCR Not Detected Not Detected    Parainfluenza Virus 1 Not Detected Not Detected    Parainfluenza Virus 2 Not Detected Not Detected    Parainfluenza Virus 3 Not Detected Not Detected    Parainfluenza Virus 4 Not Detected Not Detected    RSV, PCR Not Detected Not Detected    Bordetella pertussis pcr Not Detected Not Detected    Bordetella parapertussis PCR Not Detected Not Detected    Chlamydophila pneumoniae PCR Not Detected Not Detected    Mycoplasma pneumo by PCR Not Detected Not Detected   Blood Gas, Arterial -    Collection Time: 12/23/24  2:23 PM    Specimen: Arterial Blood   Result Value Ref Range    Site Left Brachial     Brandon's Test Positive     pH, Arterial 7.460 (H) 7.350 - 7.450 pH units    pCO2, Arterial 44.5 35.0 - 45.0 mm Hg    pO2, Arterial 55.2  (L) 80.0 - 100.0 mm Hg    HCO3, Arterial 31.6 (H) 22.0 - 28.0 mmol/L    Base Excess, Arterial 6.9 (H) 0.0 - 2.0 mmol/L    O2 Saturation, Arterial 89.6 (L) 92.0 - 98.5 %    CO2 Content >32.45 (H) 23 - 27 mmol/L    Barometric Pressure for Blood Gas 755.0000 mmHg    Modality Cannula     Flow Rate 4.0000 lpm    Rate 20 Breaths/minute    Hemodilution No     Device Comment sat 91%    ECG 12 Lead Dyspnea    Collection Time: 12/23/24  2:26 PM   Result Value Ref Range    QT Interval 300 ms    QTC Interval 399 ms   High Sensitivity Troponin T 1Hr    Collection Time: 12/23/24  3:27 PM    Specimen: Blood   Result Value Ref Range    HS Troponin T 43 (H) <14 ng/L    Troponin T Numeric Delta 2 ng/L    Troponin T % Delta 5 Abnormal if >/= 20% %       RADIOLOGY  XR Chest 1 View    Result Date: 12/23/2024  XR CHEST 1 VW-  HISTORY: Female who is 89 years-old, dyspnea, cough  TECHNIQUE: Frontal view of the chest  COMPARISON: 12/16/2024  FINDINGS: The heart is enlarged. Aorta is calcified. Pulmonary vasculature is unremarkable. Likely atelectasis or scarring in the right mid and lower lung, pulm opacifications persist. No large pleural effusion, or pneumothorax. No acute osseous process.      As described.  This report was finalized on 12/23/2024 2:59 PM by Dr. Steven Garza M.D on Workstation: [id number]       MEDICATIONS GIVEN IN ER  Medications   ampicillin-sulbactam (UNASYN) 3 g in sodium chloride 0.9 % 100 mL MBP (3 g Intravenous New Bag 12/23/24 1604)   doxycycline (VIBRAMYCIN) 100 mg in sodium chloride 0.9 % 100 mL MBP (has no administration in time range)   HYDROcodone-acetaminophen (NORCO) 5-325 MG per tablet 1 tablet (1 tablet Oral Given 12/23/24 1603)   ipratropium-albuterol (DUO-NEB) nebulizer solution 3 mL (3 mL Nebulization Given 12/23/24 1416)   bumetanide (BUMEX) injection 1 mg (1 mg Intravenous Given 12/23/24 1604)       ORDERS PLACED DURING THIS VISIT:  Orders Placed This Encounter   Procedures    Respiratory Panel  PCR w/COVID-19(SARS-CoV-2) ANAI/JORGE/THOM/PAD/COR/NIKHIL In-House, NP Swab in UTM/VTM, 2 HR TAT - Swab, Nasopharynx    Blood Culture - Blood,    Blood Culture - Blood,    XR Chest 1 View    Comprehensive Metabolic Panel    BNP    High Sensitivity Troponin T    Magnesium    Phosphorus    Lactic Acid, Plasma    Protime-INR    aPTT    Digoxin Level    CBC Auto Differential    Blood Gas, Arterial -    High Sensitivity Troponin T 1Hr    Procalcitonin    Manual Differential    Code Status and Medical Interventions: No CPR (Do Not Attempt to Resuscitate); Full Support    [facility]HA (on-call MD unless specified) Details    IP Palliative Care Nurse Consult    Inpatient Palliative Care Team Consult    ECG 12 Lead Dyspnea    Initiate Observation Status    CBC & Differential       OUTPATIENT MEDICATION MANAGEMENT:  Current Facility-Administered Medications Ordered in Epic   Medication Dose Route Frequency Provider Last Rate Last Admin    ampicillin-sulbactam (UNASYN) 3 g in sodium chloride 0.9 % 100 mL MBP  3 g Intravenous Once Rohan Daniels MD   3 g at 12/23/24 1604    doxycycline (VIBRAMYCIN) 100 mg in sodium chloride 0.9 % 100 mL MBP  100 mg Intravenous Once Rohan Daniels MD        HYDROcodone-acetaminophen (NORCO) 5-325 MG per tablet 1 tablet  1 tablet Oral Q6H PRN Rohan Daniels MD   1 tablet at 12/23/24 1603     Current Outpatient Medications Ordered in Epic   Medication Sig Dispense Refill    acetaminophen (TYLENOL) 325 MG tablet Take 2 tablets by mouth Every 4 (Four) Hours As Needed for Moderate Pain. Indications: Pain      albuterol sulfate  (90 Base) MCG/ACT inhaler Inhale 2 puffs Every 6 (Six) Hours As Needed for Wheezing or Shortness of Air. 8 g 11    azithromycin (ZITHROMAX) 250 MG tablet Take 1 tablet by mouth Daily.      benzonatate (TESSALON) 200 MG capsule Take 1 capsule by mouth 3 (Three) Times a Day As Needed for Cough. Indications: Cough      bumetanide (BUMEX) 2 MG tablet Take 1 tablet by mouth Daily.       carvedilol (COREG) 3.125 MG tablet TAKE 1 TABLET BY MOUTH TWICE DAILY WITH MEALS 180 tablet 3    digoxin (LANOXIN) 125 MCG tablet Take 1 tablet by mouth Every Other Day. Last dose 11/17  Indications: Atrial Fibrillation, Cardiac Failure 45 tablet 2    Eliquis 2.5 MG tablet tablet TAKE 1 TABLET EVERY 12 HOURS, FULL ANTICOAGULATION (Patient taking differently: Take 1 tablet by mouth Every 12 (Twelve) Hours.) 180 tablet 3    FeroSul 325 (65 Fe) MG tablet TAKE ONE TABLET BY MOUTH DAILY WITH BREAKFAST (Patient taking differently: Take 1 tablet by mouth Daily.) 90 tablet 0    fexofenadine (ALLEGRA) 180 MG tablet Take 1 tablet by mouth Daily. Indications: Hayfever      fluticasone-salmeterol (ADVAIR HFA) 115-21 MCG/ACT inhaler Inhale 2 puffs 2 (Two) Times a Day. Indications: Asthma      guaiFENesin (MUCINEX) 600 MG 12 hr tablet Take 1 tablet by mouth 2 (Two) Times a Day. Indications: Cough      HYDROcodone-acetaminophen (NORCO) 5-325 MG per tablet Take 0.5 tablets by mouth Every 6 (Six) Hours As Needed for Moderate Pain. 6 tablet 0    ipratropium-albuterol (DUO-NEB) 0.5-2.5 mg/3 ml nebulizer Take 3 mL by nebulization Daily. Indications: Asthma      Multiple Vitamins-Minerals (PRESERVISION AREDS PO) Take 1 tablet by mouth 2 (Two) Times a Day. Indications: Vitamin and/or Mineral Deficiency      O2 (OXYGEN) Inhale 2 L/min Every Night. Indications: Oxygen Therapy      potassium chloride (KLOR-CON M20) 20 MEQ CR tablet Take 1 tablet by mouth Daily. Indications: Low Amount of Potassium in the Blood 90 tablet 3    predniSONE (DELTASONE) 10 MG tablet Take 1 tablet by mouth Daily.      sertraline (ZOLOFT) 25 MG tablet Take 1 tablet by mouth Daily.      sodium chloride 7 % nebulizer solution nebulizer solution Take 4 mL by nebulization 2 (Two) Times a Day. 240 mL 0       PROCEDURES  Procedures    PROGRESS, DATA ANALYSIS, CONSULTS, AND MEDICAL DECISION MAKING  All labs have been independently interpreted by me.  All radiology  studies have been reviewed by me. All EKG's have been independently viewed and interpreted by me.  Discussion below represents my analysis of pertinent findings related to patient's condition, differential diagnosis, treatment plan and final disposition.    Differential diagnosis includes but is not limited to pneumonia, pulmonary edema, viral infection.    Clinical Scores:                   ED Course as of 12/23/24 1616   Mon Dec 23, 2024   1425 WBC(!): 16.38 [AB]   1429 EKG interpreted by myself  Time: 1426  Rate: 106  Rhythm: a fib  ST depression in lateral leads with no ST elevations, unchanged from prior EKG, normal QT interval [AB]   1430 pH, Arterial(!): 7.460 [AB]   1430 pCO2, Arterial: 44.5 [AB]   1430 pO2, Arterial(!): 55.2 [AB]   1454 Digoxin(!): <0.30 [AB]   1454 proBNP(!): 3,843.0 [AB]   1454 HS Troponin T(!): 41 [AB]   1454 Lactate: 1.6 [AB]   1454 ALT (SGPT)(!): 41 [AB]   1454 AST (SGOT)(!): 33 [AB]   1454 XR Chest 1 View  My independent interpretation of the imaging study is moderate cardiomegaly, pulmonary congestion [AB]   1542 MDM: Patient presents with shortness of breath.  Found to be acutely hypoxic, no Fortress nasal cannula.  Prescriptions for expectorants.  Infectious cause versus pulmonary edema.  Patient has been elevated BNP that this is similar to prior checks.  She does not appear peripherally edematous.  On exam, chest diffuse crackles.  Will order IV Bumex as patient experiencing Adelso.  Patient also has elevated white count with normal lactate hemoconcentrated.  Vital signs are negative.  Unclear picture on the patient's chest x-ray, will treat empirically with Unasyn and doxycycline.  Admit for further treatment. [AB]   1543 Patient and family updated on results and indication for admission.  They are agreeable.  They confirmed that patient is DNR.  They also expressed interest in meeting with hospice care while inpatient. [AB]   1613 Phone call with Dr. Mooney with [facility].  Discussed  the patient, relevant history, exam, diagnostics, ED findings/progress, and concerns. They agree to admit the patient to telemetry. Care assumed by the admitting physician at this time.  She requested palliative consult be placed.   [AB]      ED Course User Index  [AB] Rohan Daniels MD     CRITICAL CARE PERFORMED BY ED PHYSICIAN    Critical care provider statement:    Critical care time (minutes): 33.   Critical care time was exclusive of:  Separately billable procedures and treating other patients   Critical care was necessary to treat or prevent imminent or life-threatening deterioration of the following conditions:  Cardiac Failure and Respiratory Failure   Critical care was time spent personally by me on the following activities:  Development of treatment plan with patient or surrogate, discussions with consultants, evaluation of patient's response to treatment, examination of patient, obtaining history from patient or surrogate, ordering and performing treatments and interventions, ordering and review of laboratory studies, ordering and review of radiographic studies, pulse oximetry, re-evaluation of patient's condition and review of old charts. Critical Care indicators:      See progress notes for further documentation.    AS OF 16:16 EST VITALS:    BP - 137/75  HR - 102  TEMP - 99.9 °F (37.7 °C)  O2 SATS - 94%    COMPLEXITY OF CARE  The patient requires admission.      DIAGNOSIS  Final diagnoses:   Acute respiratory failure with hypoxia   Multifocal pneumonia   Acute pulmonary edema   Elevated brain natriuretic peptide (BNP) level         DISPOSITION  ED Disposition       ED Disposition   Decision to Admit    Condition   --    Comment   Level of Care: Telemetry [5]   Diagnosis: Fluid overload [[id number]]   Admitting Physician: ISRAEL ZUÑIGA [[id number]]   Attending Physician: ISRAEL ZUÑIGA [[id number]]   Is patient appropriate for Inpatient Observation Unit?: No [0]                  Please note that  portions of this document were completed with a voice recognition program.    Note Disclaimer: At [facility], we believe that sharing information builds trust and better relationships. You are receiving this note because you recently visited [facility]. It is possible you will see health information before a provider has talked with you about it. This kind of information can be easy to misunderstand. To help you fully understand what it means for your health, we urge you to discuss this note with your provider.         Rohan Daniels MD  12/23/24 6218

## 2024-12-24 PROBLEM — J96.21 ACUTE ON CHRONIC RESPIRATORY FAILURE WITH HYPOXIA: Status: ACTIVE | Noted: 2024-12-24

## 2024-12-24 PROBLEM — F41.1 GENERALIZED ANXIETY DISORDER: Status: ACTIVE | Noted: 2024-12-24

## 2024-12-24 LAB
ALBUMIN SERPL-MCNC: 3.6 G/DL (ref 3.5–5.2)
ALBUMIN/GLOB SERPL: 1 G/DL
ALP SERPL-CCNC: 148 U/L (ref 39–117)
ALT SERPL W P-5'-P-CCNC: 38 U/L (ref 1–33)
ANION GAP SERPL CALCULATED.3IONS-SCNC: 11.7 MMOL/L (ref 5–15)
ANION GAP SERPL CALCULATED.3IONS-SCNC: 12.5 MMOL/L (ref 5–15)
AST SERPL-CCNC: 32 U/L (ref 1–32)
BILIRUB SERPL-MCNC: 0.5 MG/DL (ref 0–1.2)
BILIRUB UR QL STRIP: NEGATIVE
BUN SERPL-MCNC: 16 MG/DL (ref 8–23)
BUN SERPL-MCNC: 18 MG/DL (ref 8–23)
BUN/CREAT SERPL: 20.7 (ref 7–25)
BUN/CREAT SERPL: 20.8 (ref 7–25)
CALCIUM SPEC-SCNC: 8.9 MG/DL (ref 8.2–9.6)
CALCIUM SPEC-SCNC: 9.3 MG/DL (ref 8.2–9.6)
CHLORIDE SERPL-SCNC: 103 MMOL/L (ref 98–107)
CHLORIDE SERPL-SCNC: 99 MMOL/L (ref 98–107)
CLARITY UR: CLEAR
CO2 SERPL-SCNC: 28.3 MMOL/L (ref 22–29)
CO2 SERPL-SCNC: 29.5 MMOL/L (ref 22–29)
COLOR UR: YELLOW
CREAT SERPL-MCNC: 0.77 MG/DL (ref 0.57–1)
CREAT SERPL-MCNC: 0.87 MG/DL (ref 0.57–1)
DEPRECATED RDW RBC AUTO: 49.2 FL (ref 37–54)
EGFRCR SERPLBLD CKD-EPI 2021: 61.8 ML/MIN/1.73
EGFRCR SERPLBLD CKD-EPI 2021: 71.6 ML/MIN/1.73
ERYTHROCYTE [DISTWIDTH] IN BLOOD BY AUTOMATED COUNT: 13.8 % (ref 12.3–15.4)
GLOBULIN UR ELPH-MCNC: 3.5 GM/DL
GLUCOSE SERPL-MCNC: 149 MG/DL (ref 65–99)
GLUCOSE SERPL-MCNC: 150 MG/DL (ref 65–99)
GLUCOSE UR STRIP-MCNC: NEGATIVE MG/DL
HCT VFR BLD AUTO: 30 % (ref 34–46.6)
HGB BLD-MCNC: 9.9 G/DL (ref 12–15.9)
HGB UR QL STRIP.AUTO: NEGATIVE
KETONES UR QL STRIP: NEGATIVE
LEUKOCYTE ESTERASE UR QL STRIP.AUTO: NEGATIVE
MCH RBC QN AUTO: 32.6 PG (ref 26.6–33)
MCHC RBC AUTO-ENTMCNC: 33 G/DL (ref 31.5–35.7)
MCV RBC AUTO: 98.7 FL (ref 79–97)
NITRITE UR QL STRIP: NEGATIVE
PH UR STRIP.AUTO: 6 [PH] (ref 5–8)
PLATELET # BLD AUTO: 276 10*3/MM3 (ref 140–450)
PMV BLD AUTO: 10.1 FL (ref 6–12)
POTASSIUM SERPL-SCNC: 3.4 MMOL/L (ref 3.5–5.2)
POTASSIUM SERPL-SCNC: 4.7 MMOL/L (ref 3.5–5.2)
PROT SERPL-MCNC: 7.1 G/DL (ref 6–8.5)
PROT UR QL STRIP: NEGATIVE
RBC # BLD AUTO: 3.04 10*6/MM3 (ref 3.77–5.28)
SODIUM SERPL-SCNC: 141 MMOL/L (ref 136–145)
SODIUM SERPL-SCNC: 143 MMOL/L (ref 136–145)
SP GR UR STRIP: 1.01 (ref 1–1.03)
TROPONIN T SERPL HS-MCNC: 31 NG/L
UROBILINOGEN UR QL STRIP: NORMAL
WBC NRBC COR # BLD AUTO: 12.41 10*3/MM3 (ref 3.4–10.8)

## 2024-12-24 PROCEDURE — 99221 1ST HOSP IP/OBS SF/LOW 40: CPT | Performed by: NURSE PRACTITIONER

## 2024-12-24 PROCEDURE — 25010000002 FUROSEMIDE PER 20 MG: Performed by: INTERNAL MEDICINE

## 2024-12-24 PROCEDURE — 84484 ASSAY OF TROPONIN QUANT: CPT | Performed by: STUDENT IN AN ORGANIZED HEALTH CARE EDUCATION/TRAINING PROGRAM

## 2024-12-24 PROCEDURE — 92610 EVALUATE SWALLOWING FUNCTION: CPT | Performed by: SPEECH-LANGUAGE PATHOLOGIST

## 2024-12-24 PROCEDURE — 81003 URINALYSIS AUTO W/O SCOPE: CPT | Performed by: STUDENT IN AN ORGANIZED HEALTH CARE EDUCATION/TRAINING PROGRAM

## 2024-12-24 PROCEDURE — 94799 UNLISTED PULMONARY SVC/PX: CPT

## 2024-12-24 PROCEDURE — 25010000002 CEFTRIAXONE PER 250 MG: Performed by: INTERNAL MEDICINE

## 2024-12-24 PROCEDURE — 25010000002 DIGOXIN PER 500 MCG: Performed by: INTERNAL MEDICINE

## 2024-12-24 PROCEDURE — 25010000002 METHYLPREDNISOLONE PER 40 MG: Performed by: INTERNAL MEDICINE

## 2024-12-24 PROCEDURE — 85027 COMPLETE CBC AUTOMATED: CPT | Performed by: INTERNAL MEDICINE

## 2024-12-24 PROCEDURE — 80053 COMPREHEN METABOLIC PANEL: CPT | Performed by: INTERNAL MEDICINE

## 2024-12-24 PROCEDURE — 99222 1ST HOSP IP/OBS MODERATE 55: CPT | Performed by: INTERNAL MEDICINE

## 2024-12-24 PROCEDURE — 99497 ADVNCD CARE PLAN 30 MIN: CPT | Performed by: NURSE PRACTITIONER

## 2024-12-24 PROCEDURE — 97162 PT EVAL MOD COMPLEX 30 MIN: CPT | Performed by: PHYSICAL THERAPIST

## 2024-12-24 RX ORDER — DIGOXIN 0.25 MG/ML
250 INJECTION INTRAMUSCULAR; INTRAVENOUS ONCE
Status: COMPLETED | OUTPATIENT
Start: 2024-12-24 | End: 2024-12-24

## 2024-12-24 RX ORDER — CARVEDILOL 6.25 MG/1
6.25 TABLET ORAL 2 TIMES DAILY WITH MEALS
Status: DISCONTINUED | OUTPATIENT
Start: 2024-12-24 | End: 2024-12-27 | Stop reason: HOSPADM

## 2024-12-24 RX ORDER — POTASSIUM CHLORIDE 1.5 G/1.58G
40 POWDER, FOR SOLUTION ORAL EVERY 4 HOURS
Status: COMPLETED | OUTPATIENT
Start: 2024-12-24 | End: 2024-12-24

## 2024-12-24 RX ADMIN — DOXYCYCLINE 100 MG: 100 CAPSULE ORAL at 20:25

## 2024-12-24 RX ADMIN — METHYLPREDNISOLONE SODIUM SUCCINATE 20 MG: 40 INJECTION, POWDER, FOR SOLUTION INTRAMUSCULAR; INTRAVENOUS at 04:36

## 2024-12-24 RX ADMIN — BUDESONIDE AND FORMOTEROL FUMARATE DIHYDRATE 2 PUFF: 160; 4.5 AEROSOL RESPIRATORY (INHALATION) at 19:39

## 2024-12-24 RX ADMIN — Medication 4 ML: at 19:39

## 2024-12-24 RX ADMIN — POTASSIUM CHLORIDE 20 MEQ: 750 TABLET, EXTENDED RELEASE ORAL at 10:23

## 2024-12-24 RX ADMIN — METHYLPREDNISOLONE SODIUM SUCCINATE 20 MG: 40 INJECTION, POWDER, FOR SOLUTION INTRAMUSCULAR; INTRAVENOUS at 20:25

## 2024-12-24 RX ADMIN — FUROSEMIDE 40 MG: 10 INJECTION, SOLUTION INTRAMUSCULAR; INTRAVENOUS at 20:25

## 2024-12-24 RX ADMIN — IPRATROPIUM BROMIDE AND ALBUTEROL SULFATE 3 ML: 2.5; .5 SOLUTION RESPIRATORY (INHALATION) at 19:39

## 2024-12-24 RX ADMIN — CEFTRIAXONE SODIUM 1000 MG: 1 INJECTION, POWDER, FOR SOLUTION INTRAMUSCULAR; INTRAVENOUS at 20:24

## 2024-12-24 RX ADMIN — CARVEDILOL 3.12 MG: 3.12 TABLET, FILM COATED ORAL at 10:23

## 2024-12-24 RX ADMIN — GUAIFENESIN 600 MG: 600 TABLET, MULTILAYER, EXTENDED RELEASE ORAL at 10:23

## 2024-12-24 RX ADMIN — APIXABAN 2.5 MG: 2.5 TABLET, FILM COATED ORAL at 20:25

## 2024-12-24 RX ADMIN — POTASSIUM CHLORIDE 40 MEQ: 1.5 FOR SOLUTION ORAL at 10:24

## 2024-12-24 RX ADMIN — POTASSIUM CHLORIDE 40 MEQ: 1.5 FOR SOLUTION ORAL at 07:13

## 2024-12-24 RX ADMIN — DOXYCYCLINE 100 MG: 100 CAPSULE ORAL at 10:24

## 2024-12-24 RX ADMIN — Medication 1 TABLET: at 10:23

## 2024-12-24 RX ADMIN — DIGOXIN 125 MCG: 125 TABLET ORAL at 10:23

## 2024-12-24 RX ADMIN — FUROSEMIDE 40 MG: 10 INJECTION, SOLUTION INTRAMUSCULAR; INTRAVENOUS at 13:08

## 2024-12-24 RX ADMIN — SERTRALINE HYDROCHLORIDE 25 MG: 25 TABLET ORAL at 10:23

## 2024-12-24 RX ADMIN — GUAIFENESIN 600 MG: 600 TABLET, MULTILAYER, EXTENDED RELEASE ORAL at 20:25

## 2024-12-24 RX ADMIN — FUROSEMIDE 40 MG: 10 INJECTION, SOLUTION INTRAMUSCULAR; INTRAVENOUS at 00:13

## 2024-12-24 RX ADMIN — DIGOXIN 250 MCG: 0.25 INJECTION INTRAMUSCULAR; INTRAVENOUS at 16:26

## 2024-12-24 RX ADMIN — Medication 2.5 MG: at 20:25

## 2024-12-24 RX ADMIN — METHYLPREDNISOLONE SODIUM SUCCINATE 20 MG: 40 INJECTION, POWDER, FOR SOLUTION INTRAMUSCULAR; INTRAVENOUS at 13:08

## 2024-12-24 RX ADMIN — APIXABAN 2.5 MG: 2.5 TABLET, FILM COATED ORAL at 10:23

## 2024-12-24 RX ADMIN — CARVEDILOL 6.25 MG: 6.25 TABLET, FILM COATED ORAL at 18:27

## 2024-12-24 RX ADMIN — Medication 1 TABLET: at 20:25

## 2024-12-24 NOTE — PLAN OF CARE
Goal Outcome Evaluation:  Plan of Care Reviewed With: patient           Outcome Evaluation: Patient was readmitted from assisted living with shortness of air and cough.  Patient was recently admitted to the hospital with compression fracture and was using a TLSO for comfort.  No brace present in the room today.  Patient was up in the bathroom when PT arrived.  Patient required contact-guard assist to stand and ambulate a short distance in the room with rolling walker.  Further ambulation distance was deferred as patient was eager to get to the chair for breakfast.  She stood with standby assist at the sink to wash her hands.  Patient presents with generalized weakness, decreased endurance and decreased functional mobility.  She would benefit from PT to address these impairments.  Anticipate the patient should be able to return to assisted living.  Patient was educated on importance of ambulating multiple times a day while admitted with nursing supervision.    Anticipated Discharge Disposition (PT): assisted living

## 2024-12-24 NOTE — PROGRESS NOTES
Name: Agnieszka Rizzo ADMIT: 2024   : 1930  PCP: Matt Rose MD    MRN: [id number] LOS: 0 days   AGE/SEX: 89 y.o. female  ROOM: [facility]     Subjective   Subjective   Patient seen and examined at bedside, on 4L NC.  Family wishes her to be treated for acute issues then discharge to facility with hospice.        Objective   Objective   Vital Signs  Temp:  [97.3 °F (36.3 °C)-98.6 °F (37 °C)] 98.6 °F (37 °C)  Heart Rate:  [] 97  Resp:  [18-22] 18  BP: (105-133)/(68-84) 126/72  SpO2:  [92 %-100 %] 92 %  on  Flow (L/min) (Oxygen Therapy):  [3-4] 3;   Device (Oxygen Therapy): room air  Body mass index is 22.22 kg/m².  Physical Exam  Constitutional:       General: She is not in acute distress.     Appearance: She is ill-appearing.   HENT:      Head: Normocephalic and atraumatic.      Nose: Nose normal. No congestion.      Mouth/Throat:      Pharynx: Oropharynx is clear. No oropharyngeal exudate.   Eyes:      General: No scleral icterus.  Cardiovascular:      Rate and Rhythm: Normal rate. Rhythm irregular.      Heart sounds: Murmur heard.   Pulmonary:      Effort: No respiratory distress.      Breath sounds: Rhonchi present. No wheezing or rales.      Comments: Patient on 4L NC  Abdominal:      General: There is no distension.      Tenderness: There is no abdominal tenderness. There is no guarding.   Musculoskeletal:         General: No swelling, deformity or signs of injury.      Cervical back: Normal range of motion. No rigidity.   Skin:     Coloration: Skin is not jaundiced.      Findings: No bruising or lesion.   Neurological:      Mental Status: Mental status is at baseline.      Motor: Weakness present.       Results Review     I reviewed the patient's new clinical results.  Results from last 7 days   Lab Units 24  0522 24  1358 24  0504 24  0538   WBC 10*3/mm3 12.41* 16.38* 13.93* 13.22*   HEMOGLOBIN g/dL 9.9* 10.8* 10.2* 9.6*   PLATELETS 10*3/mm3 276 226 972 244  "    Results from last 7 days   Lab Units 12/24/24  0844 12/24/24  0522 12/23/24  1358 12/20/24  0504   SODIUM mmol/L 143 141 138 141   POTASSIUM mmol/L 4.7 3.4* 3.5 3.6   CHLORIDE mmol/L 103 99 95* 101   CO2 mmol/L 28.3 29.5* 30.0* 31.0*   BUN mg/dL 18 16 13 18   CREATININE mg/dL 0.87 0.77 0.77 0.72   GLUCOSE mg/dL 149* 150* 139* 114*   EGFR mL/min/1.73 61.8 71.6 71.6 77.6     Results from last 7 days   Lab Units 12/24/24  0844 12/23/24  1358   ALBUMIN g/dL 3.6 3.6   BILIRUBIN mg/dL 0.5 0.6   ALK PHOS U/L 148* 141*   AST (SGOT) U/L 32 33*   ALT (SGPT) U/L 38* 41*     Results from last 7 days   Lab Units 12/24/24  0844 12/24/24  0522 12/23/24  1358 12/20/24  0504   CALCIUM mg/dL 9.3 8.9 9.2 9.1   ALBUMIN g/dL 3.6  --  3.6  --    MAGNESIUM mg/dL  --   --  2.3  --    PHOSPHORUS mg/dL  --   --  2.8  --      Results from last 7 days   Lab Units 12/23/24  1358   PROCALCITONIN ng/mL 0.10   LACTATE mmol/L 1.6     No results found for: \"HGBA1C\", \"POCGLU\"    XR Chest 1 View    Result Date: 12/23/2024  As described.  This report was finalized on 12/23/2024 2:59 PM by Dr. Steven Garza M.D on Workstation: [id number]       I have personally reviewed all medications:  Scheduled Medications  apixaban, 2.5 mg, Oral, Q12H  budesonide-formoterol, 2 puff, Inhalation, BID - RT  carvedilol, 6.25 mg, Oral, BID With Meals  cefTRIAXone, 1,000 mg, Intravenous, Q24H  digoxin, 250 mcg, Intravenous, Once  digoxin, 125 mcg, Oral, Every Other Day  doxycycline, 100 mg, Oral, Q12H  ferrous sulfate, 325 mg, Oral, Daily  furosemide, 40 mg, Intravenous, Q12H  guaiFENesin, 600 mg, Oral, BID  ipratropium-albuterol, 3 mL, Nebulization, Q6H While Awake - RT  methylPREDNISolone sodium succinate, 20 mg, Intravenous, Q8H  multivitamin with minerals, 1 tablet, Oral, BID  potassium chloride, 20 mEq, Oral, Daily  sertraline, 25 mg, Oral, Daily  sodium chloride, 4 mL, Nebulization, BID    Infusions   Diet  Diet: Cardiac; Healthy Heart (2-3 Na+); Texture: Soft " to Chew (NDD 3); Soft to Chew: Ground Meat; Fluid Consistency: Thin (IDDSI 0)    I have personally reviewed:  [x]  Laboratory   [x]  Microbiology   [x]  Radiology   [x]  EKG/Telemetry  [x]  Cardiology/Vascular   []  Pathology    []  Records       Assessment/Plan     Active Hospital Problems    Diagnosis  POA    **Fluid overload [E87.70]  Yes      Resolved Hospital Problems   No resolved problems to display.       89 y.o. female admitted with Fluid overload.    #Acute on Chronic Hypoxic respiratory failure  #AeCOPD    - ABG 7.46/44.5/55/31.6    - Currently on 4L NC, wean as tolerated    - Duoneb QID    - IV solu-medrol, transition to PO tomorrow if improviing    - Symbicort BID    - will cover for pneumonia as CXR with opacifications    - Rocephin 1g IV daily     - Doxycycline 100mg IV BID    - wbc 16.38 > 12.41    - RVP negative    #Acute on Chronic HFpEF  #CAD    - repeat echo ordered    - diuresis continued with IV lasix    - HS troponin 43 > 31    - echo ordered    #Chronic A. Fib    - digoxin level low, IV digoxin ordered by cardiology and PO digoxin continued    - Continue home Coreg, cardiology will attempt to increase    - continue home Eliquis    #Dementia    - Pateint AAOx1-2 with poor cognition, appears near base    - return to ILF with [facility] per family when at baseline     #Anxiety    - resume home zoloft    #Anemia    - no overt bleeding, hgb 9.9    - continue home iron    #HTN    - continue coreg        Eliquis (home med) for DVT prophylaxis.  Limited code (no CPR, no intubation).  Discussed with patient and nursing staff.  Anticipate discharge to SNU facility in 1-2 days.  Expected Discharge Date: 12/26/2024; Expected Discharge Time:       Gene West DO  [facility]  12/24/24  15:57 EST

## 2024-12-24 NOTE — OUTREACH NOTE
Medical Week 2 Survey      Flowsheet Row Responses   [facility] patient discharged from? [facility]   Does the patient have one of the following disease processes/diagnoses(primary or secondary)? Other   Week 2 attempt successful? No   Unsuccessful attempts Attempt 1   Revoke Readmitted            KARLA RUTH - Registered Nurse

## 2024-12-24 NOTE — PLAN OF CARE
"Goal Outcome Evaluation:  Plan of Care Reviewed With: patient           Outcome Evaluation: Clinical swallow eval completed.  Pt with hstory of  VFSS in 2022 recommending regular diet/thin liquids but with additional note of \"multilevel degenerative disc disease. There is calcific carotid atherosclerosis. An episode of aspiration is visualized with puree. Limited images of the thoracic esophagus demonstrate proximal escape, delayed clearance, and tertiary contractions.\"  Todays assessment demonstrates delayed throat clearing with thin liquids and coughing with solids with thin trials.  Pt stated she felt \"a release like a burp\" on one occasion preceding coughing event.  Pt symptoms are characteristic of esophageal dysfunction.  [facility] consult entered during eval.  Discussed swallow results with pt who stated she would prefer thin liquids, solid foods but with ground meats.  Educated on importance of upright positioning during and one hour after meals.  Above diet entered by RN with precautions acknowledged by pt.                            "

## 2024-12-24 NOTE — THERAPY EVALUATION
Acute Care - Speech Language Pathology   Swallow Initial Evaluation [facility]     Patient Name: Agnieszka Rizzo  : 1930  MRN: [id number]  Today's Date: 2024               Admit Date: 2024    Visit Dx:     ICD-10-CM ICD-9-CM   1. Acute respiratory failure with hypoxia  J96.01 518.81   2. Multifocal pneumonia  J18.9 486   3. Acute pulmonary edema  J81.0 518.4   4. Elevated brain natriuretic peptide (BNP) level  R79.89 790.99     Patient Active Problem List   Diagnosis    Primary hypertension    Nonobstructive atherosclerosis of coronary artery    Familial hypercholesterolemia    Mitral valve insufficiency    Ventricular premature beats    Ventricular tachycardia    Chronic respiratory failure with hypoxia    DNR (do not resuscitate)    Asymptomatic bacteriuria    Iron deficiency anemia    Hypokalemia    Bronchiectasis    KINA (mycobacterium avium-intracellulare)    Permanent atrial fibrillation    Anxiety    Medicare annual wellness visit, subsequent    Herpes infection    Abnormal EKG    Alteration in anticoagulation    Blind right eye    COPD (chronic obstructive pulmonary disease)    Chronic diastolic CHF (congestive heart failure)    Orthostasis    Nonrheumatic tricuspid valve regurgitation    Pulmonary hypertension    Pericardial effusion    Fracture of greater trochanter of left femur    Leukocytosis    Anemia    Infectious disorder of bronchus    Rhinovirus infection    Calculus of gallbladder without cholecystitis without obstruction    Abdominal pain    Osteoporotic compression fracture of thoracic spine    Fluid overload     Past Medical History:   Diagnosis Date    Aspergillus     Asthma     Atrial fibrillation     chronic    Atrial flutter     Bronchiectasis     C. difficile diarrhea 2017    CAD (coronary artery disease)     nonobstructive    Chronic diastolic CHF (congestive heart failure)     Clinical diagnosis of COVID-19 2022    Colitis     Cough     Cryoglobulinemia      Dyspnea on exertion     Exposure to hepatitis A 04/20/2018    Fall     Hyperlipidemia     Hypertension     Hyponatremia     Hypoxia     Infectious viral hepatitis     AGE 13    Left shoulder pain     Leg swelling     Lesion of lung     Malignant hyperthermia due to anesthesia     Mild tricuspid regurgitation     MR (mitral regurgitation)     mild    MVP (mitral valve prolapse)     Permanent atrial fibrillation     Pneumonia of both lungs due to infectious organism 05/31/2017    Followed BY ID for MAC      Pneumonia with the fungal infection aspergillosis 01/06/2017    Pneumothorax     SOB (shortness of breath)     Syncope 07/2024    UTI (urinary tract infection)     Wheeze     mild     Past Surgical History:   Procedure Laterality Date    BRONCHOSCOPY N/A 11/12/2016    Procedure: BRONCHOSCOPY WITH FLUORO, BRUSHINGS, BAL, AND BIOPSIES;  Surgeon: Rogelio Tucker MD;  Location: [facility] ENDOSCOPY;  Service:     BRONCHOSCOPY Bilateral 06/03/2017    Procedure: BRONCHOSCOPY with BAL ;  Surgeon: Sung King MD;  Location: [facility] ENDOSCOPY;  Service:     BRONCHOSCOPY N/A 12/17/2019    Procedure: BRONCHOSCOPY WITH WASHINGS;  Surgeon: Rogelio Tucker MD;  Location: [facility] ENDOSCOPY;  Service: Pulmonary    BRONCHOSCOPY N/A 07/15/2022    Procedure: BRONCHOSCOPY;  Surgeon: Rogelio Tucker MD;  Location: [facility] ENDOSCOPY;  Service: Pulmonary;  Laterality: N/A;  Pre: Pneumonia  Post: Pneumonia    BRONCHOSCOPY N/A 10/2/2023    Procedure: BRONCHOSCOPY WITH BRONCHIAL AVEOLAR LAVAGE;  Surgeon: Rogelio Tucker MD;  Location: [facility] ENDOSCOPY;  Service: Pulmonary;  Laterality: N/A;  PRE:BRONCHIECTASIS /   POST: SAME    CATARACT EXTRACTION EXTRACAPSULAR W/ INTRAOCULAR LENS IMPLANTATION      COLONOSCOPY      2013    D & C WITH SUCTION      HYSTERECTOMY      KNEE ARTHROSCOPY Left     Partial       SLP Recommendation and Plan  SLP Swallowing Diagnosis: mild, suspected esophageal dysphagia (12/24/24 7221)  SLP Diet Recommendation: soft to chew  textures, ground, thin liquids (12/24/24 0920)  Recommended Precautions and Strategies: upright posture during/after eating, small bites of food and sips of liquid (12/24/24 0920)  SLP Rec. for Method of Medication Administration: with puree (12/24/24 0920)     Monitor for Signs of Aspiration: notify SLP if any concerns (12/24/24 0920)        Anticipated Discharge Disposition (SLP): unknown (12/24/24 0920)     Therapy Frequency (Swallow): evaluation only (12/24/24 0920)  Predicted Duration Therapy Intervention (Days): until discharge (12/24/24 0920)  Oral Care Recommendations: Oral Care BID/PRN (12/24/24 0920)                                        Outcome Evaluation: Clinical swallow eval completed.  Pt with hstory of      SWALLOW EVALUATION (Last 72 Hours)       SLP Adult Swallow Evaluation       Row Name 12/24/24 1000 12/24/24 0920                Rehab Evaluation    Document Type --  -SA evaluation  -SA       Subjective Information --  -SA no complaints  -SA       Patient Observations --  -SA alert;cooperative  -SA       Patient Effort --  -SA good  -SA       Symptoms Noted During/After Treatment -- none  -SA          General Information    Patient Profile Reviewed -- yes  -SA       Pertinent History Of Current Problem -- 89 year old admitted w/ SOB, cough; VFSS 12/2022 with fxnl oropharyngeal swallow but with delayed esophageal clearance and tertiary contractions  -SA       Current Method of Nutrition -- regular textures;thin liquids  -SA       Precautions/Limitations, Vision -- WFL;for purposes of eval  -SA       Precautions/Limitations, Hearing -- WFL;for purposes of eval  -SA       Prior Level of Function-Communication -- unknown  -SA       Prior Level of Function-Swallowing -- other (see comments)  reg/thin rec 2022 per VFSS w/ concern of esopgageal fxn  -SA       Plans/Goals Discussed with -- patient;agreed upon  -SA       Barriers to Rehab -- medically complex  -SA          Pain    Pretreatment Pain Rating  -- 0/10 - no pain  -SA       Posttreatment Pain Rating -- 0/10 - no pain  -SA          Oral Motor Structure and Function    Dentition Assessment -- missing teeth  -          Clinical Swallow Eval    Oral Prep Phase -- WFL  -SA       Oral Transit -- WFL  -SA       Oral Residue -- WFL  -SA       Pharyngeal Phase -- suspected pharyngeal impairment  -          Pharyngeal Phase Concerns    Pharyngeal Phase Concerns -- cough;throat clear  -       Cough -- mixed consistencies;mechanical soft  -SA       Throat Clear -- thin  -SA          SLP Evaluation Clinical Impression    SLP Swallowing Diagnosis -- mild;suspected esophageal dysphagia  -       Functional Impact -- risk of aspiration/pneumonia  -          Recommendations    Therapy Frequency (Swallow) -- evaluation only  -       Predicted Duration Therapy Intervention (Days) -- until discharge  -       SLP Diet Recommendation -- soft to chew textures;ground;thin liquids  -       Recommended Precautions and Strategies -- upright posture during/after eating;small bites of food and sips of liquid  -       Oral Care Recommendations -- Oral Care BID/PRN  -       SLP Rec. for Method of Medication Administration -- with puree  -       Monitor for Signs of Aspiration -- notify SLP if any concerns  -       Anticipated Discharge Disposition (SLP) -- unknown  -                 User Key  (r) = Recorded By, (t) = Taken By, (c) = Cosigned By      Initials Name Effective Dates    SA Elisha Mcclendon, SUZANNA 01/11/24 -                     EDUCATION  The patient has been educated in the following areas:   Dysphagia (Swallowing Impairment) Oral Care/Hydration Modified Diet Instruction.                Time Calculation:    Time Calculation- SLP       Row Name 12/24/24 6403             Time Calculation- [facility]    SLP Start Time 0915  -      SLP Received On 12/24/24  -                User Key  (r) = Recorded By, (t) = Taken By, (c) = Cosigned By      Initials Name  Provider Type     Elisha Mcclendon SLP Speech and Language Pathologist                    Therapy Charges for Today       Code Description Service Date Service Provider Modifiers Qty    [id number] HC ST EVAL ORAL PHARYNG SWALLOW 4 12/24/2024 Elisha Mcclendon SLP GN 1                 SUZANNA Johns  12/24/2024

## 2024-12-24 NOTE — CONSULTS
.            [facility]    Palliative Care Initial Consult   Attending Physician: Gene West DO  Referring Provider: Dr West     Reason for Referral: assistance with clarification of goals of care and hospice referral or discussion  Family/Support: children (Michelle, Handy,  andEric)    Code Status and Medical Interventions: No CPR (Do Not Attempt to Resuscitate); Limited Support; No intubation (DNI)   Ordered at: 12/23/24 1949     Medical Intervention Limits:    No intubation (DNI)     Code Status (Patient has no pulse and is not breathing):    No CPR (Do Not Attempt to Resuscitate)     Medical Interventions (Patient has pulse or is breathing):    Limited Support     Goals of Care: To treat acute illness and return to facility setting with [facility] support.     HPI:   89 y.o. female with history chronic diastolic heart failure, atrial fibrillation, chronic hypoxic respiratory failure with oxygen at bedtime at 2 liters, pulmonary hypertension, hypertension, hyperlipidemia and recent  thoracic compression fractures. She resided at [facility].   [facility]24 related to shortness of breath and cough.Workup in ER, revealed elevated proBNP (3843), leukocytosis. She had CXR that revealed  pulmonary vasculature is unremarkable. She was given IV bumex. A consult was placed for cardiology, which is pending. She has had recurrent hospital admissions. The family requested a palliative care consult for support with goals of care conversation.   Palliative Care Spoke With: patient and HCS  Quality of life: fair  Functional Status: Patient was up in the bathroom when PT arrived. Patient required contact-guard assist to stand and ambulate a short distance in the room with rolling walker. Further ambulation distance was deferred as patient was eager to get to the chair for breakfast. She stood with standby assist  at the sink to wash her hands. Patient presents with generalized weakness, decreased endurance and decreased functional mobility. Per PT notes on 12/24/2024  Due to the Palliative Care Topics Discussed: palliative care, goals of care, care options, resuscitation status, [facility], and discharge options we will establish an advance care plan.   Advance Care Planning   Advance Care Planning Discussion: see below          Review of Systems   Unable to perform ROS: Dementia   Constitutional: Negative for decreased appetite and malaise/fatigue.   Cardiovascular:  Negative for chest pain.   Respiratory:  Negative for cough and shortness of breath.    Psychiatric/Behavioral:  Negative for depression. The patient is not nervous/anxious.        1-      Past Medical History:   Diagnosis Date    Aspergillus     Asthma     Atrial fibrillation     chronic    Atrial flutter     Bronchiectasis     C. difficile diarrhea 03/11/2017    CAD (coronary artery disease)     nonobstructive    Chronic diastolic CHF (congestive heart failure)     Clinical diagnosis of COVID-19 01/04/2022    Colitis     Cough     Cryoglobulinemia     Dyspnea on exertion     Exposure to hepatitis A 04/20/2018    Fall     Hyperlipidemia     Hypertension     Hyponatremia     Hypoxia     Infectious viral hepatitis     AGE 13    Left shoulder pain     Leg swelling     Lesion of lung     Malignant hyperthermia due to anesthesia     Mild tricuspid regurgitation     MR (mitral regurgitation)     mild    MVP (mitral valve prolapse)     Permanent atrial fibrillation     Pneumonia of both lungs due to infectious organism 05/31/2017    Followed BY ID for MAC      Pneumonia with the fungal infection aspergillosis 01/06/2017    Pneumothorax     SOB (shortness of breath)     Syncope 07/2024    UTI (urinary tract infection)     Wheeze     mild     Past Surgical History:   Procedure Laterality Date    BRONCHOSCOPY N/A 11/12/2016    Procedure: BRONCHOSCOPY WITH FLUORO,  BRUSHINGS, BAL, AND BIOPSIES;  Surgeon: Rogelio Tucker MD;  Location: [facility] ENDOSCOPY;  Service:     BRONCHOSCOPY Bilateral 06/03/2017    Procedure: BRONCHOSCOPY with BAL ;  Surgeon: Sung King MD;  Location: [facility] ENDOSCOPY;  Service:     BRONCHOSCOPY N/A 12/17/2019    Procedure: BRONCHOSCOPY WITH WASHINGS;  Surgeon: Rogelio Tucker MD;  Location: [facility] ENDOSCOPY;  Service: Pulmonary    BRONCHOSCOPY N/A 07/15/2022    Procedure: BRONCHOSCOPY;  Surgeon: Rogelio Tucker MD;  Location: [facility] ENDOSCOPY;  Service: Pulmonary;  Laterality: N/A;  Pre: Pneumonia  Post: Pneumonia    BRONCHOSCOPY N/A 10/2/2023    Procedure: BRONCHOSCOPY WITH BRONCHIAL AVEOLAR LAVAGE;  Surgeon: Rogelio Tucker MD;  Location: [facility] ENDOSCOPY;  Service: Pulmonary;  Laterality: N/A;  PRE:BRONCHIECTASIS /   POST: SAME    CATARACT EXTRACTION EXTRACAPSULAR W/ INTRAOCULAR LENS IMPLANTATION      COLONOSCOPY      2013    D & C WITH SUCTION      HYSTERECTOMY      KNEE ARTHROSCOPY Left     Partial     Social History     Socioeconomic History    Marital status:    Tobacco Use    Smoking status: Never    Smokeless tobacco: Never    Tobacco comments:     caffiene daily   Vaping Use    Vaping status: Never Used   Substance and Sexual Activity    Alcohol use: Not Currently     Alcohol/week: 2.0 standard drinks of alcohol     Types: 1 Glasses of wine, 1 Shots of liquor per week    Drug use: No    Sexual activity: Defer     Partners: Male       Current Facility-Administered Medications   Medication Dose Route Frequency Provider Last Rate Last Admin    acetaminophen (TYLENOL) tablet 650 mg  650 mg Oral Q4H PRN Carlene Mooney MD        Or    acetaminophen (TYLENOL) 160 MG/5ML oral solution 650 mg  650 mg Oral Q4H PRN Carlene Mooney MD        Or    acetaminophen (TYLENOL) suppository 650 mg  650 mg Rectal Q4H PRN Carlene Mooney MD        albuterol (PROVENTIL) nebulizer solution 0.083% 2.5 mg/3mL  2.5 mg Nebulization Q6H PRN Carlene Mooney  MD Russell        apixaban (ELIQUIS) tablet 2.5 mg  2.5 mg Oral Q12H Carlene Mooney MD   2.5 mg at 12/23/24 2201    benzonatate (TESSALON) capsule 200 mg  200 mg Oral TID PRN Carlene Mooney MD        sennosides-docusate (PERICOLACE) 8.6-50 MG per tablet 2 tablet  2 tablet Oral BID PRN Carlene Mooney MD        And    polyethylene glycol (MIRALAX) packet 17 g  17 g Oral Daily PRN Carlene Mooney MD        And    bisacodyl (DULCOLAX) EC tablet 5 mg  5 mg Oral Daily PRN Carlene Mooney MD        And    bisacodyl (DULCOLAX) suppository 10 mg  10 mg Rectal Daily PRN Carlene Mooney MD        budesonide-formoterol (SYMBICORT) 160-4.5 MCG/ACT inhaler 2 puff  2 puff Inhalation BID - RT Carlene Mooney MD   2 puff at 12/23/24 2121    carvedilol (COREG) tablet 3.125 mg  3.125 mg Oral BID With Meals Carlene Mooney MD   3.125 mg at 12/23/24 2201    cefTRIAXone (ROCEPHIN) 1,000 mg in sodium chloride 0.9 % 100 mL MBP  1,000 mg Intravenous Q24H Carlene Mooney  mL/hr at 12/23/24 2107 1,000 mg at 12/23/24 2107    digoxin (LANOXIN) tablet 125 mcg  125 mcg Oral Every Other Day Carlene Mooney MD        doxycycline (MONODOX) capsule 100 mg  100 mg Oral Q12H Carlene Mooney MD   100 mg at 12/23/24 2202    ferrous sulfate tablet 325 mg  325 mg Oral Daily Carlene Mooney MD   325 mg at 12/23/24 2202    furosemide (LASIX) injection 40 mg  40 mg Intravenous Q12H Carlene Mooney MD   40 mg at 12/24/24 0013    guaiFENesin (MUCINEX) 12 hr tablet 600 mg  600 mg Oral BID Carlene Mooney MD   600 mg at 12/23/24 2203    HYDROcodone-acetaminophen (NORCO) 5-325 MG per tablet 1 tablet  1 tablet Oral Q6H PRN Rohan Daniels MD   1 tablet at 12/23/24 1603    ipratropium-albuterol (DUO-NEB) nebulizer solution 3 mL  3 mL Nebulization Q6H While Awake - RT Carlene Mooney MD   3 mL at 12/23/24 2121    melatonin tablet 2.5 mg  2.5 mg Oral  Nightly PRN Carlene Mooney MD        methylPREDNISolone sodium succinate (SOLU-Medrol) injection 20 mg  20 mg Intravenous Q8H Carlene Mooney MD   20 mg at 12/24/24 0436    multivitamin with minerals 1 tablet  1 tablet Oral BID Carlene Mooney MD   1 tablet at 12/23/24 2203    ondansetron ODT (ZOFRAN-ODT) disintegrating tablet 4 mg  4 mg Oral Q6H PRN Carlene Mooney MD        Or    ondansetron (ZOFRAN) injection 4 mg  4 mg Intravenous Q6H PRN Carlene Mooney MD        potassium chloride (K-DUR,KLOR-CON) ER tablet 20 mEq  20 mEq Oral Daily Carlene Mooney MD        potassium chloride (KLOR-CON) packet 40 mEq  40 mEq Oral Q4H Gene West DO   40 mEq at 12/24/24 0713    Potassium Replacement - Follow Nurse / BPA Driven Protocol   Not Applicable PRN Gene West DO        sertraline (ZOLOFT) tablet 25 mg  25 mg Oral Daily Carlene Mooney MD        sodium chloride 7 % nebulizer solution nebulizer solution 4 mL  4 mL Nebulization BID Carlene Mooney MD   4 mL at 12/23/24 2121          acetaminophen **OR** acetaminophen **OR** acetaminophen    albuterol    benzonatate    senna-docusate sodium **AND** polyethylene glycol **AND** bisacodyl **AND** bisacodyl    HYDROcodone-acetaminophen    melatonin    ondansetron ODT **OR** ondansetron    Potassium Replacement - Follow Nurse / BPA Driven Protocol    Allergies   Allergen Reactions    Amlodipine Besylate Swelling    Aspirin GI Intolerance     Caused bleeding ulcers    Bactrim [Sulfamethoxazole-Trimethoprim] Nausea And Vomiting    Erythromycin Unknown (See Comments)     Patient does not recall type of reaction.    Levaquin [Levofloxacin] Unknown (See Comments)     Nausea      Nitrofurantoin Nausea Only and Nausea And Vomiting    Ramipril Other (See Comments)     Cough     Attest that current medications reviewed  including but not limited to prescriptions, over-the counter, herbals and vitamin/mineral/dietary  (nutritional) supplements for name, route of administration, type, dose and frequency and are current using all immediate resources available at time of dictation.      Intake/Output Summary (Last 24 hours) at 12/24/2024 0914  Last data filed at 12/24/2024 0716  Gross per 24 hour   Intake 380 ml   Output 500 ml   Net -120 ml       Physical Exam:    Diagnostics: Reviewed  /82 (BP Location: Right arm, Patient Position: Lying)   Pulse 82   Temp 97.7 °F (36.5 °C) (Axillary)   Resp 18   Wt 56.9 kg (125 lb 7.1 oz)   SpO2 99%   BMI 22.22 kg/m²     Constitutional:       Appearance: Not in distress. Chronically ill-appearing.      Interventions: Nasal cannula in place.      Comments: NC @ 4 liter  Elderly    Pulmonary:      Effort: Pulmonary effort is normal. No tachypnea.      Breath sounds: Normal breath sounds.   Cardiovascular:      PMI at left midclavicular line. Normal rate.   Edema:     Peripheral edema absent.   Abdominal:      General: Bowel sounds are normal.      Palpations: Abdomen is soft.      Tenderness: There is no abdominal tenderness.   Genitourinary:     Comments: Purewick with mildred urine noted   Neurological:      Mental Status: Alert and oriented to person, place, and time.      Comments: Disoriented to situation    Psychiatric:         Attention and Perception: Attention and perception normal.         Mood and Affect: Mood and affect normal.         Speech: Speech normal.         Behavior: Behavior is cooperative.         Thought Content: Thought content normal.         Cognition and Memory: Memory is impaired.       Patient status: Disease state: Controlled with current treatments.  Functional status: Palliative Performance Scale Score: Performance 60% based on the following measures: Ambulation: Reduced, Activity and Evidence of Disease: Unable to do hobby or some work, significant evidence of disease, Self-Care: Occasional assist necessary,  Intake: Normal or reduced, LOC: Full or  confusion   Nutritional status: Albumin 3.6 on 12/24/2024 Body mass index is 22.22 kg/m².    Family support: The patient receives support from her children..  Advance Directives: Advance Directive Status: Patient has advance directive, copy in chart   POA/Healthcare surrogate-daughter- Michelle, then son, Handy.       Impression/Problem List:    Acute on chronic heart failure     Atrial fibrillation   Dementia   Coronary artery disease  Pulmonary hypertension   Thoracic compression fractures     Hypertension    Recommendations/Plan:  1. Provide support with goals of care  2. [facility] consult  .    2.  Palliative care encounter  The patient is unable to participate in goals of care conversation due to impaired cognition. She has a living will on file that designates her daughter, Michelle then son,  Tres (Handy). I called Michelle at 0948. She has a very good understanding of patient medical conditions, which we reviewed in detail. She informs me that her mother has been with [facility] for about two years. Their last evaluation with [facility] was prior to Thanksgiving and follow up was due at the beginning of the year. She feels that with recurrent hospitalizations and progressive decline in her mothers health she wants to transition to [facility] support in outpatient setting. Of note, her mother has expressed her desire to stop coming to the hospital. I placed [facility] consult on her behalf. I didn't address CODE status as this had already been addressed. Cultural and spiritual needs have been assessed and no interventions warranted. She was very appreciative of support provided. Her goal is for her mother to be treated for acute conditions, return to [place] with [facility] support. I spoke with Orin RN and DOLORES Milton.       Thank you for this consult and allowing us to participate in patient's plan of care. Palliative Care Team will sign off and see PRN.      Time spent:30 minutes spent reviewing medical and  medication records, assessing and examining patient, discussing with family, answering questions, providing some guidance about a plan and documentation of care, and coordinating care with other healthcare members, with > 50% time spent face to face.   .30 additional minutes spent on advance care planning, goals of care and code status discussions.     Kaia Li, APRN  12/24/2024  09:14 EST

## 2024-12-24 NOTE — CASE MANAGEMENT/SOCIAL WORK
Discharge Planning Assessment  [facility]     Patient Name: Agnieszka Rizzo  MRN: [id number]  Today's Date: 12/24/2024    Admit Date: 12/23/2024    Plan: Return to [facility] with Palliative, transition to [facility]   Discharge Needs Assessment       Row Name 12/24/24 1143       Living Environment    People in Home facility resident    Current Living Arrangements assisted living facility    Duration at Residence [facility]    Potentially Unsafe Housing Conditions none    Primary Care Provided by self    Family Caregiver if Needed child(graciela), adult    Quality of Family Relationships helpful;involved    Able to Return to Prior Arrangements yes       Resource/Environmental Concerns    Resource/Environmental Concerns none    Transportation Concerns none       Transition Planning    Patient/Family Anticipates Transition to home    Patient/Family Anticipated Services at Transition hospice care    Transportation Anticipated family or friend will provide       Discharge Needs Assessment    Readmission Within the Last 30 Days no previous admission in last 30 days    Equipment Currently Used at Home walker, rolling;oxygen  HS oxygen only through [place]    Concerns to be Addressed adjustment to diagnosis/illness    Anticipated Changes Related to Illness none    Equipment Needed After Discharge none                   Discharge Plan       Row Name 12/24/24 6925       Plan    Plan Return to [facility] with Palliative, transition to [facility]    Patient/Family in Agreement with Plan yes    Plan Comments Spoke with patient dthien Martinez by phone, pt with history of dementia/forgetfulness, introduced self, explained CCP role and verified face sheet and pharmacy information. Pt [facility] at [facility], dtr manages her medications, staff reminds her to take them. Pt is normally IADL's, she has nocturnal oxygen through [place] with transport tank, rollator, nebulizer, gb, sc and w/c if needed. She has in house  therapy at [facility], she has used [facility] and been to [place], would not return to [place]. Plans return home with family to transport, per Kaia Palliative care RN, pt [facility], [facility] [place]  CCP will follow -Karine ORTIZ                  Continued Care and Services - Admitted Since 12/23/2024    No active coordination exists for this encounter.       Selected Continued Care - Prior Encounters Includes continued care and service providers with selected services from prior encounters from 9/24/2024 to 12/24/2024      Discharged on 12/21/2024 Admission date: 12/16/2024 - Discharge disposition: Home or Self Care      Durable Medical Equipment       Service Provider Services Address Phone Fax Patient Preferred    [facility] [address] [id number] [phone] --                          Expected Discharge Date and Time       Expected Discharge Date Expected Discharge Time    Dec 26, 2024            Demographic Summary       Row Name 12/24/24 1143       General Information    Admission Type observation                   Functional Status       Row Name 12/24/24 1143       Functional Status    Usual Activity Tolerance good    Current Activity Tolerance good       Assessment of Health Literacy    Health Literacy Good       Functional Status, IADL    Meal Preparation assistive person    Housekeeping assistive person    Laundry assistive person    Shopping assistive person    IADL Comments REGINALD       Mental Status    General Appearance WDL WDL       Mental Status Summary    Recent Changes in Mental Status/Cognitive Functioning no changes                   Psychosocial    No documentation.                  Abuse/Neglect    No documentation.                  Legal       Row Name 12/24/24 1143       Financial/Legal    Who Manages Finances if Patient Unable dtr                   Substance Abuse    No  documentation.                  Patient Forms    No documentation.                     Karine Shea RN

## 2024-12-24 NOTE — H&P
HISTORY AND PHYSICAL   [facility]        Date of Admission: 2024  Patient Identification:  Name: Agnieszka Rizzo  Age: 89 y.o.  Sex: female  :  1930  MRN: [id number]                     Primary Care Physician: Matt Rose MD    Chief Complaint:  89 year old female presented to the emergency room with shortness of breath and cough for the last week; she resides in assisted living; she uses oxygen at night; no fever or chills; no weight gain; no nausea or vomiting; she was just discharged two days ago after a hospital stay due to a compression fracture; she has a a history of copd, chf and pulmonary hypertension;; she is not able to give any history due to memory impairment    History of Present Illness:   As above    Past Medical History:  Past Medical History:   Diagnosis Date    Aspergillus     Asthma     Atrial fibrillation     chronic    Atrial flutter     Bronchiectasis     C. difficile diarrhea 2017    CAD (coronary artery disease)     nonobstructive    Chronic diastolic CHF (congestive heart failure)     Clinical diagnosis of COVID-19 2022    Colitis     Cough     Cryoglobulinemia     Dyspnea on exertion     Exposure to hepatitis A 2018    Fall     Hyperlipidemia     Hypertension     Hyponatremia     Hypoxia     Infectious viral hepatitis     AGE 13    Left shoulder pain     Leg swelling     Lesion of lung     Malignant hyperthermia due to anesthesia     Mild tricuspid regurgitation     MR (mitral regurgitation)     mild    MVP (mitral valve prolapse)     Permanent atrial fibrillation     Pneumonia of both lungs due to infectious organism 2017    Followed BY ID for MAC      Pneumonia with the fungal infection aspergillosis 2017    Pneumothorax     SOB (shortness of breath)     Syncope 2024    UTI (urinary tract infection)     Wheeze     mild     Past Surgical History:  Past Surgical History:   Procedure Laterality Date    BRONCHOSCOPY N/A  11/12/2016    Procedure: BRONCHOSCOPY WITH FLUORO, BRUSHINGS, BAL, AND BIOPSIES;  Surgeon: Rogelio Tucker MD;  Location: [facility];  Service:     BRONCHOSCOPY Bilateral 06/03/2017    Procedure: BRONCHOSCOPY with BAL ;  Surgeon: Sung King MD;  Location: [facility];  Service:     BRONCHOSCOPY N/A 12/17/2019    Procedure: BRONCHOSCOPY WITH WASHINGS;  Surgeon: Rogelio Tucker MD;  Location: [facility];  Service: Pulmonary    BRONCHOSCOPY N/A 07/15/2022    Procedure: BRONCHOSCOPY;  Surgeon: Rogelio Tucker MD;  Location: [facility];  Service: Pulmonary;  Laterality: N/A;  Pre: Pneumonia  Post: Pneumonia    BRONCHOSCOPY N/A 10/2/2023    Procedure: BRONCHOSCOPY WITH BRONCHIAL AVEOLAR LAVAGE;  Surgeon: Rogelio Tucker MD;  Location: [facility];  Service: Pulmonary;  Laterality: N/A;  PRE:BRONCHIECTASIS /   POST: SAME    CATARACT EXTRACTION EXTRACAPSULAR W/ INTRAOCULAR LENS IMPLANTATION      COLONOSCOPY      2013    D & C WITH SUCTION      HYSTERECTOMY      KNEE ARTHROSCOPY Left     Partial      Home Meds:  Medications Prior to Admission   Medication Sig Dispense Refill Last Dose/Taking    albuterol sulfate  (90 Base) MCG/ACT inhaler Inhale 2 puffs Every 6 (Six) Hours As Needed for Wheezing or Shortness of Air. 8 g 11 12/22/2024    azithromycin (ZITHROMAX) 250 MG tablet Take 1 tablet by mouth Daily.   12/22/2024    bumetanide (BUMEX) 2 MG tablet Take 1 tablet by mouth Daily.   12/22/2024    carvedilol (COREG) 3.125 MG tablet TAKE 1 TABLET BY MOUTH TWICE DAILY WITH MEALS 180 tablet 3 12/22/2024    digoxin (LANOXIN) 125 MCG tablet Take 1 tablet by mouth Every Other Day. Last dose 11/17  Indications: Atrial Fibrillation, Cardiac Failure 45 tablet 2 12/22/2024    Eliquis 2.5 MG tablet tablet TAKE 1 TABLET EVERY 12 HOURS, FULL ANTICOAGULATION (Patient taking differently: Take 1 tablet by mouth Every 12 (Twelve) Hours.) 180 tablet 3 12/22/2024    FeroSul 325 (65 Fe) MG tablet TAKE ONE TABLET BY MOUTH  DAILY WITH BREAKFAST (Patient taking differently: Take 1 tablet by mouth Daily.) 90 tablet 0 12/22/2024    fexofenadine (ALLEGRA) 180 MG tablet Take 1 tablet by mouth Daily. Indications: Hayfever   12/22/2024    fluticasone-salmeterol (ADVAIR HFA) 115-21 MCG/ACT inhaler Inhale 2 puffs 2 (Two) Times a Day. Indications: Asthma   12/22/2024    guaiFENesin (MUCINEX) 600 MG 12 hr tablet Take 1 tablet by mouth 2 (Two) Times a Day. Indications: Cough   12/22/2024    ipratropium-albuterol (DUO-NEB) 0.5-2.5 mg/3 ml nebulizer Take 3 mL by nebulization Daily. Indications: Asthma   12/22/2024    Multiple Vitamins-Minerals (PRESERVISION AREDS PO) Take 1 tablet by mouth 2 (Two) Times a Day. Indications: Vitamin and/or Mineral Deficiency   12/22/2024    O2 (OXYGEN) Inhale 2 L/min Every Night. Indications: Oxygen Therapy   12/22/2024    potassium chloride (KLOR-CON M20) 20 MEQ CR tablet Take 1 tablet by mouth Daily. Indications: Low Amount of Potassium in the Blood 90 tablet 3 12/22/2024    predniSONE (DELTASONE) 10 MG tablet Take 1 tablet by mouth Daily.   12/22/2024    sertraline (ZOLOFT) 25 MG tablet Take 1 tablet by mouth Daily.   12/22/2024    sodium chloride 7 % nebulizer solution nebulizer solution Take 4 mL by nebulization 2 (Two) Times a Day. 240 mL 0 12/22/2024    acetaminophen (TYLENOL) 325 MG tablet Take 2 tablets by mouth Every 4 (Four) Hours As Needed for Moderate Pain. Indications: Pain       benzonatate (TESSALON) 200 MG capsule Take 1 capsule by mouth 3 (Three) Times a Day As Needed for Cough. Indications: Cough       HYDROcodone-acetaminophen (NORCO) 5-325 MG per tablet Take 0.5 tablets by mouth Every 6 (Six) Hours As Needed for Moderate Pain. 6 tablet 0        Allergies:  Allergies   Allergen Reactions    Amlodipine Besylate Swelling    Aspirin GI Intolerance     Caused bleeding ulcers    Bactrim [Sulfamethoxazole-Trimethoprim] Nausea And Vomiting    Erythromycin Unknown (See Comments)     Patient does not recall  type of reaction.    Levaquin [Levofloxacin] Unknown (See Comments)     Nausea      Nitrofurantoin Nausea Only and Nausea And Vomiting    Ramipril Other (See Comments)     Cough     Immunizations:  Immunization History   Administered Date(s) Administered    COVID-19 (PFIZER) Purple Cap Monovalent 2021, 2021, 2021    Flu Vaccine Intradermal Quad 18-64YR 10/01/2016    Fluad Quad 65+ 2020    Fluzone High-Dose 65+YRS 10/01/2016, 2017, 10/01/2018    Pneumococcal Conjugate 13-Valent (PCV13) 10/01/2016    Pneumococcal Polysaccharide (PPSV23) 2000    Shingrix 2018, 2018    Tdap 2021     Social History:   Social History     Social History Narrative    Not on file     Social History     Socioeconomic History    Marital status:    Tobacco Use    Smoking status: Never    Smokeless tobacco: Never    Tobacco comments:     caffiene daily   Vaping Use    Vaping status: Never Used   Substance and Sexual Activity    Alcohol use: Not Currently     Alcohol/week: 2.0 standard drinks of alcohol     Types: 1 Glasses of wine, 1 Shots of liquor per week    Drug use: No    Sexual activity: Defer     Partners: Male       Family History:  Family History   Problem Relation Age of Onset    Hypertension Mother     Stroke Mother     Heart disease Father     Hypertension Father     Cancer Brother     Cancer Son         Review of Systems  Not obtainable from the patient    Objective:  T Max 24 hrs: Temp (24hrs), Av.2 °F (37.3 °C), Min:98.4 °F (36.9 °C), Max:99.9 °F (37.7 °C)    Vitals Ranges:   Temp:  [98.4 °F (36.9 °C)-99.9 °F (37.7 °C)] 98.4 °F (36.9 °C)  Heart Rate:  [101-118] 101  Resp:  [22] 22  BP: (105-153)/(68-85) 127/77      Exam:  /77 (BP Location: Right arm, Patient Position: Lying)   Pulse 101   Temp 98.4 °F (36.9 °C) (Oral)   Resp 22   Wt 57 kg (125 lb 10.6 oz)   SpO2 98%   BMI 22.26 kg/m²     General Appearance:    Alert, cooperative, no distress, appears  stated age; chronically ill appearing   Head:    Normocephalic, without obvious abnormality, atraumatic   Eyes:    PERRL, conjunctivae/corneas clear, EOM's intact, both eyes   Ears:    Normal external ear canals, both ears   Nose:   Nares normal, septum midline, mucosa normal, no drainage    or sinus tenderness   Throat:   Lips, mucosa, and tongue normal   Neck:   Supple, symmetrical, trachea midline, no adenopathy;     thyroid:  no enlargement/tenderness/nodules; no carotid    bruit or JVD   Back:     Symmetric, no curvature, ROM normal, no CVA tenderness   Lungs:     Clear to auscultation bilaterally, respirations unlabored   Chest Wall:    No tenderness or deformity    Heart:    Regular rate and rhythm, S1 and S2 normal, no murmur, rub   or gallop   Abdomen:     Soft, nontender, bowel sounds active all four quadrants,     no masses, no hepatomegaly, no splenomegaly   Extremities:   Extremities normal, atraumatic, no cyanosis or edema        Data Review:  Labs in chart were reviewed.  WBC   Date Value Ref Range Status   12/23/2024 16.38 (H) 3.40 - 10.80 10*3/mm3 Final     Hemoglobin   Date Value Ref Range Status   12/23/2024 10.8 (L) 12.0 - 15.9 g/dL Final     Hematocrit   Date Value Ref Range Status   12/23/2024 33.2 (L) 34.0 - 46.6 % Final     Platelets   Date Value Ref Range Status   12/23/2024 226 140 - 450 10*3/mm3 Final     Sodium   Date Value Ref Range Status   12/23/2024 138 136 - 145 mmol/L Final     Potassium   Date Value Ref Range Status   12/23/2024 3.5 3.5 - 5.2 mmol/L Final     Comment:     Slight hemolysis detected by analyzer. Result may be falsely elevated.     Chloride   Date Value Ref Range Status   12/23/2024 95 (L) 98 - 107 mmol/L Final     CO2   Date Value Ref Range Status   12/23/2024 30.0 (H) 22.0 - 29.0 mmol/L Final     BUN   Date Value Ref Range Status   12/23/2024 13 8 - 23 mg/dL Final     Creatinine   Date Value Ref Range Status   12/23/2024 0.77 0.57 - 1.00 mg/dL Final     Glucose    Date Value Ref Range Status   12/23/2024 139 (H) 65 - 99 mg/dL Final     Calcium   Date Value Ref Range Status   12/23/2024 9.2 8.2 - 9.6 mg/dL Final     Magnesium   Date Value Ref Range Status   12/23/2024 2.3 1.7 - 2.3 mg/dL Final     Phosphorus   Date Value Ref Range Status   12/23/2024 2.8 2.5 - 4.5 mg/dL Final     AST (SGOT)   Date Value Ref Range Status   12/23/2024 33 (H) 1 - 32 U/L Final     Comment:     Slight hemolysis detected by analyzer. Result may be falsely elevated.     ALT (SGPT)   Date Value Ref Range Status   12/23/2024 41 (H) 1 - 33 U/L Final     Alkaline Phosphatase   Date Value Ref Range Status   12/23/2024 141 (H) 39 - 117 U/L Final                Imaging Results (All)       Procedure Component Value Units Date/Time    XR Chest 1 View [[id number]] Collected: 12/23/24 1458     Updated: 12/23/24 1502    Narrative:      XR CHEST 1 VW-     HISTORY: Female who is 89 years-old, dyspnea, cough     TECHNIQUE: Frontal view of the chest     COMPARISON: 12/16/2024     FINDINGS: The heart is enlarged. Aorta is calcified. Pulmonary  vasculature is unremarkable. Likely atelectasis or scarring in the right  mid and lower lung, pulm opacifications persist. No large pleural  effusion, or pneumothorax. No acute osseous process.       Impression:      As described.     This report was finalized on 12/23/2024 2:59 PM by Dr. Steven Garza M.D on Workstation: [id number]                 Assessment:  Active Hospital Problems    Diagnosis  POA    **Fluid overload [E87.70]  Yes      Resolved Hospital Problems   No resolved problems to display.   Chronic hypoxic respiratory failure  Dementia  Cad  Chronic diastolic chf  A fib  Hypertension  Pulmonary hypertension    Plan:  Will continue to diurese a little  Antibiotics  Monitor on telemetry  Wean oxygen if tolerated  Trend labs  Dw patient and ed provider    Carlene Mooney MD  12/23/2024  19:45 EST

## 2024-12-24 NOTE — PLAN OF CARE
Problem: Adult Inpatient Plan of Care  Goal: Plan of Care Review  Flowsheets (Taken 12/24/2024 0045)  Plan of Care Reviewed With: patient     Problem: Adult Inpatient Plan of Care  Goal: Absence of Hospital-Acquired Illness or Injury  Intervention: Identify and Manage Fall Risk  Recent Flowsheet Documentation  Taken 12/24/2024 0017 by Jodie Barnes RN  Safety Promotion/Fall Prevention:   clutter free environment maintained   fall prevention program maintained   lighting adjusted   room organization consistent   safety round/check completed  Taken 12/23/2024 2201 by Jodie Barnes RN  Safety Promotion/Fall Prevention:   clutter free environment maintained   fall prevention program maintained   lighting adjusted   room organization consistent   safety round/check completed     Problem: Skin Injury Risk Increased  Goal: Skin Health and Integrity  Reactivated  Intervention: Optimize Skin Protection  Recent Flowsheet Documentation  Taken 12/24/2024 0017 by Jodie Barnes RN  Activity Management: activity encouraged  Head of Bed (HOB) Positioning: HOB elevated  Taken 12/23/2024 2201 by Jodie Barnes RN  Activity Management: bedrest  Head of Bed (HOB) Positioning: HOB at 30 degrees  Taken 12/23/2024 2125 by Jodie Barnes RN  Pressure Reduction Techniques: frequent weight shift encouraged  Pressure Reduction Devices: positioning supports utilized  Skin Protection: incontinence pads utilized     Problem: Fall Injury Risk  Goal: Absence of Fall and Fall-Related Injury  Reactivated  Intervention: Identify and Manage Contributors  Recent Flowsheet Documentation  Taken 12/24/2024 0017 by Jodie Barnes RN  Medication Review/Management: medications reviewed  Taken 12/23/2024 2201 by Jodie Barnes RN  Medication Review/Management: medications reviewed  Intervention: Promote Injury-Free Environment  Recent Flowsheet Documentation  Taken 12/24/2024 0017 by Jodie Barnes RN  Safety Promotion/Fall Prevention:   clutter free  environment maintained   fall prevention program maintained   lighting adjusted   room organization consistent   safety round/check completed  Taken 12/23/2024 2201 by Jodie Barnes RN  Safety Promotion/Fall Prevention:   clutter free environment maintained   fall prevention program maintained   lighting adjusted   room organization consistent   safety round/check completed   Goal Outcome Evaluation:    Pt did not have new infection on this shift. Pt GCS is 14. Pt needs multiple reorientation. Pt is disoriented x4. Pt thinks her name is Arsenio. Pt not aware of her surroundings, situation and or time. Pt attempted to get out of bed several times, reporting that she needed to void even though she had a pure wick. Pt was reoriented several times to name, place, time and situation,time before she fall asleep.    Plan of Care Reviewed With: patient

## 2024-12-24 NOTE — CONSULTS
Date of Consultation: 24    Referral Provider: Rohan Daniels MD     Reason for Consultation: CHF exacerbation.    Encounter Provider: Yobany Jarquin MD    Group of Service: [facility]     Patient Name: Agnieszka Rizzo    :1930    Chief complaint: Shortness of breath.     History of Present Illness:      This is a very pleasant 89 year-old female who follows with Dr. Robbins in our practice. She has a past medical history significant for hypertension, hyperlipidemia, acute on chronic diastolic heart failure, and persistent atrial fibrillation.     Her echocardiogram in 2023 showed ejection fraction of 56%, biatrial severe dilation, trace aortic regurgitation, aortic valve leaflets mildly calcific, mild to moderate mitral regurgitation, mild mitral annular calcification, moderate tricuspid regurgitation, moderate pulmonary hypertension (RVSP 49 mmHg), and small circumferential pericardial effusion without tamponade.  She has required multiple hospitalizations for CHF requiring IV diuretics.    She presented to the hospital again with volume overload and shortness of breath.  She has had some abdominal edema, and her BNP was elevated at 3843.  Her atrial fibrillation rate has been elevated, although not severely.  She does feel better after IV Bumex was administered.  She denied any chest pain.      ECHO 10/6/2023    Left ventricular systolic function is normal. Calculated left ventricular EF = 56%    Normal right ventricular cavity size and systolic function noted.    The left atrial cavity is severely dilated.    The right atrial cavity is severely dilated.    Mild to moderate mitral valve regurgitation is present.    Moderate tricuspid valve regurgitation is present.    Calculated right ventricular systolic pressure from tricuspid regurgitation is 49 mmHg.    There is a small (<1cm) circumferential pericardial effusion. There is no evidence of cardiac tamponade.     Stress  Test 12/20/2018  Left ventricular ejection fraction is normal (Calculated EF = 68%).  Myocardial perfusion imaging indicates a normal myocardial perfusion study with no evidence of ischemia.  Impressions are consistent with a low risk study.          Past Medical History:   Diagnosis Date    Aspergillus     Asthma     Atrial fibrillation     chronic    Atrial flutter     Bronchiectasis     C. difficile diarrhea 03/11/2017    CAD (coronary artery disease)     nonobstructive    Chronic diastolic CHF (congestive heart failure)     Clinical diagnosis of COVID-19 01/04/2022    Colitis     Cough     Cryoglobulinemia     Dyspnea on exertion     Exposure to hepatitis A 04/20/2018    Fall     Hyperlipidemia     Hypertension     Hyponatremia     Hypoxia     Infectious viral hepatitis     AGE 13    Left shoulder pain     Leg swelling     Lesion of lung     Malignant hyperthermia due to anesthesia     Mild tricuspid regurgitation     MR (mitral regurgitation)     mild    MVP (mitral valve prolapse)     Permanent atrial fibrillation     Pneumonia of both lungs due to infectious organism 05/31/2017    Followed BY ID for MAC      Pneumonia with the fungal infection aspergillosis 01/06/2017    Pneumothorax     SOB (shortness of breath)     Syncope 07/2024    UTI (urinary tract infection)     Wheeze     mild         Past Surgical History:   Procedure Laterality Date    BRONCHOSCOPY N/A 11/12/2016    Procedure: BRONCHOSCOPY WITH FLUORO, BRUSHINGS, BAL, AND BIOPSIES;  Surgeon: Rogelio Tucker MD;  Location: [facility] ENDOSCOPY;  Service:     BRONCHOSCOPY Bilateral 06/03/2017    Procedure: BRONCHOSCOPY with BAL ;  Surgeon: Sung King MD;  Location: [facility] ENDOSCOPY;  Service:     BRONCHOSCOPY N/A 12/17/2019    Procedure: BRONCHOSCOPY WITH WASHINGS;  Surgeon: Rogelio Tucker MD;  Location: [facility] ENDOSCOPY;  Service: Pulmonary    BRONCHOSCOPY N/A 07/15/2022    Procedure: BRONCHOSCOPY;  Surgeon: Rogelio Tucker MD;  Location: [facility];   Service: Pulmonary;  Laterality: N/A;  Pre: Pneumonia  Post: Pneumonia    BRONCHOSCOPY N/A 10/2/2023    Procedure: BRONCHOSCOPY WITH BRONCHIAL AVEOLAR LAVAGE;  Surgeon: Rogelio Tucker MD;  Location: [facility] ENDOSCOPY;  Service: Pulmonary;  Laterality: N/A;  PRE:BRONCHIECTASIS /   POST: SAME    CATARACT EXTRACTION EXTRACAPSULAR W/ INTRAOCULAR LENS IMPLANTATION      COLONOSCOPY      2013    D & C WITH SUCTION      HYSTERECTOMY      KNEE ARTHROSCOPY Left     Partial         Allergies   Allergen Reactions    Amlodipine Besylate Swelling    Aspirin GI Intolerance     Caused bleeding ulcers    Bactrim [Sulfamethoxazole-Trimethoprim] Nausea And Vomiting    Erythromycin Unknown (See Comments)     Patient does not recall type of reaction.    Levaquin [Levofloxacin] Unknown (See Comments)     Nausea      Nitrofurantoin Nausea Only and Nausea And Vomiting    Ramipril Other (See Comments)     Cough         No current facility-administered medications on file prior to encounter.     Current Outpatient Medications on File Prior to Encounter   Medication Sig Dispense Refill    albuterol sulfate  (90 Base) MCG/ACT inhaler Inhale 2 puffs Every 6 (Six) Hours As Needed for Wheezing or Shortness of Air. 8 g 11    azithromycin (ZITHROMAX) 250 MG tablet Take 1 tablet by mouth Daily.      bumetanide (BUMEX) 2 MG tablet Take 1 tablet by mouth Daily.      carvedilol (COREG) 3.125 MG tablet TAKE 1 TABLET BY MOUTH TWICE DAILY WITH MEALS 180 tablet 3    digoxin (LANOXIN) 125 MCG tablet Take 1 tablet by mouth Every Other Day. Last dose 11/17  Indications: Atrial Fibrillation, Cardiac Failure 45 tablet 2    Eliquis 2.5 MG tablet tablet TAKE 1 TABLET EVERY 12 HOURS, FULL ANTICOAGULATION (Patient taking differently: Take 1 tablet by mouth Every 12 (Twelve) Hours.) 180 tablet 3    FeroSul 325 (65 Fe) MG tablet TAKE ONE TABLET BY MOUTH DAILY WITH BREAKFAST (Patient taking differently: Take 1 tablet by mouth Daily.) 90 tablet 0    fexofenadine  (ALLEGRA) 180 MG tablet Take 1 tablet by mouth Daily. Indications: Hayfever      fluticasone-salmeterol (ADVAIR HFA) 115-21 MCG/ACT inhaler Inhale 2 puffs 2 (Two) Times a Day. Indications: Asthma      guaiFENesin (MUCINEX) 600 MG 12 hr tablet Take 1 tablet by mouth 2 (Two) Times a Day. Indications: Cough      ipratropium-albuterol (DUO-NEB) 0.5-2.5 mg/3 ml nebulizer Take 3 mL by nebulization Daily. Indications: Asthma      Multiple Vitamins-Minerals (PRESERVISION AREDS PO) Take 1 tablet by mouth 2 (Two) Times a Day. Indications: Vitamin and/or Mineral Deficiency      O2 (OXYGEN) Inhale 2 L/min Every Night. Indications: Oxygen Therapy      potassium chloride (KLOR-CON M20) 20 MEQ CR tablet Take 1 tablet by mouth Daily. Indications: Low Amount of Potassium in the Blood 90 tablet 3    predniSONE (DELTASONE) 10 MG tablet Take 1 tablet by mouth Daily.      sertraline (ZOLOFT) 25 MG tablet Take 1 tablet by mouth Daily.      sodium chloride 7 % nebulizer solution nebulizer solution Take 4 mL by nebulization 2 (Two) Times a Day. 240 mL 0    acetaminophen (TYLENOL) 325 MG tablet Take 2 tablets by mouth Every 4 (Four) Hours As Needed for Moderate Pain. Indications: Pain      benzonatate (TESSALON) 200 MG capsule Take 1 capsule by mouth 3 (Three) Times a Day As Needed for Cough. Indications: Cough      HYDROcodone-acetaminophen (NORCO) 5-325 MG per tablet Take 0.5 tablets by mouth Every 6 (Six) Hours As Needed for Moderate Pain. 6 tablet 0         Social History     Socioeconomic History    Marital status:    Tobacco Use    Smoking status: Never    Smokeless tobacco: Never    Tobacco comments:     caffiene daily   Vaping Use    Vaping status: Never Used   Substance and Sexual Activity    Alcohol use: Not Currently     Alcohol/week: 2.0 standard drinks of alcohol     Types: 1 Glasses of wine, 1 Shots of liquor per week    Drug use: No    Sexual activity: Defer     Partners: Male         Family History   Problem Relation  Age of Onset    Hypertension Mother     Stroke Mother     Heart disease Father     Hypertension Father     Cancer Brother     Cancer Son        REVIEW OF SYSTEMS:   Pertinent positives are noted in the HPI above.  Otherwise, all other systems were reviewed, and are negative.     Objective:     Vitals:    12/24/24 0742 12/24/24 1026 12/24/24 1027 12/24/24 1400   BP: 125/82   126/72   BP Location: Right arm   Right arm   Patient Position: Lying   Lying   Pulse: 82 96 90 97   Resp: 18   18   Temp: 97.7 °F (36.5 °C)   98.6 °F (37 °C)   TempSrc: Axillary   Oral   SpO2: 99%  100% 92%   Weight:         Body mass index is 22.22 kg/m².  Flowsheet Rows      Flowsheet Row First Filed Value   Admission Height --   Admission Weight 57 kg (125 lb 10.6 oz) Documented at 12/23/2024 1844             General:    No acute distress, alert, pleasant                   Head:    Normocephalic, atraumatic.   Eyes:          Conjunctivae and sclerae normal, no icterus.   Throat:   No oral lesions, no thrush, oral mucosa moist.    Neck:   Supple, trachea midline.   Lungs:     Faint rales at the bases bilaterally.    Heart:    Irregularly irregular rhythm and normal rate. II/VI SM throughout.   Abdomen:     Soft, non-tender, mildly distended, positive bowel sounds.    Extremities:   Trace edema of the lower extremities.     Pulses:   Pulses palpable and equal bilaterally.    Skin:   No bleeding or rash.   Neuro:   Non-focal.  Moves all extremities well.    Psychiatric:   Normal mood and affect.                 Lab Review:                Results from last 7 days   Lab Units 12/24/24  0844   SODIUM mmol/L 143   POTASSIUM mmol/L 4.7   CHLORIDE mmol/L 103   CO2 mmol/L 28.3   BUN mg/dL 18   CREATININE mg/dL 0.87   GLUCOSE mg/dL 149*   CALCIUM mg/dL 9.3     Results from last 7 days   Lab Units 12/24/24  0522 12/23/24  1527 12/23/24  1358   HSTROP T ng/L 31* 43* 41*     Results from last 7 days   Lab Units 12/24/24  0522   WBC 10*3/mm3 12.41*    HEMOGLOBIN g/dL 9.9*   HEMATOCRIT % 30.0*   PLATELETS 10*3/mm3 276     Results from last 7 days   Lab Units 12/23/24  1358   INR  1.28*   APTT seconds 28.5         Results from last 7 days   Lab Units 12/23/24  1358   MAGNESIUM mg/dL 2.3           EKG (reviewed by me personally):            Assessment:   1.  Acute on chronic diastolic CHF (unclear trigger)  2.  Persistent atrial fibrillation with intermittent mild RVR  3.  Severe biatrial enlargement by echo on 10/6/2023  4.  Coronary artery disease  5.  Hypertension  6.  Multifactorial chronic anemia  7.  Dementia    Plan:       Again, she has had multiple admissions for congestive heart failure.  Her last echocardiogram was over 1 year ago.  I am going to get an echocardiogram in order to help guide therapy.  I would like to see if her valvular disease has worsened, although I am doubtful that she is a candidate for any valvular intervention.    For now, I would continue her on Lasix 40 mg IV every 12 hours.  Reevaluate volume status tomorrow.  Her atrial fibrillation has been high at times, although largely appears to be controlled.  I am going to try to increase her carvedilol to 6.25 mg twice daily for better rate control.  Her digoxin level was also less than 0.30 on admission.  I am going to give her 250 mcg of IV digoxin now in case this is contributing.  She is on Eliquis otherwise.    Cardiology will continue to follow.    Thank you very much for this consult.    Vasiliy Jarquin MD

## 2024-12-24 NOTE — THERAPY EVALUATION
Patient Name: Agnieszka Rizzo  : 1930    MRN: [id number]                              Today's Date: 2024       Admit Date: 2024    Visit Dx:     ICD-10-CM ICD-9-CM   1. Acute respiratory failure with hypoxia  J96.01 518.81   2. Multifocal pneumonia  J18.9 486   3. Acute pulmonary edema  J81.0 518.4   4. Elevated brain natriuretic peptide (BNP) level  R79.89 790.99     Patient Active Problem List   Diagnosis    Primary hypertension    Nonobstructive atherosclerosis of coronary artery    Familial hypercholesterolemia    Mitral valve insufficiency    Ventricular premature beats    Ventricular tachycardia    Chronic respiratory failure with hypoxia    DNR (do not resuscitate)    Asymptomatic bacteriuria    Iron deficiency anemia    Hypokalemia    Bronchiectasis    KINA (mycobacterium avium-intracellulare)    Permanent atrial fibrillation    Anxiety    Medicare annual wellness visit, subsequent    Herpes infection    Abnormal EKG    Alteration in anticoagulation    Blind right eye    COPD (chronic obstructive pulmonary disease)    Chronic diastolic CHF (congestive heart failure)    Orthostasis    Nonrheumatic tricuspid valve regurgitation    Pulmonary hypertension    Pericardial effusion    Fracture of greater trochanter of left femur    Leukocytosis    Anemia    Infectious disorder of bronchus    Rhinovirus infection    Calculus of gallbladder without cholecystitis without obstruction    Abdominal pain    Osteoporotic compression fracture of thoracic spine    Fluid overload     Past Medical History:   Diagnosis Date    Aspergillus     Asthma     Atrial fibrillation     chronic    Atrial flutter     Bronchiectasis     C. difficile diarrhea 2017    CAD (coronary artery disease)     nonobstructive    Chronic diastolic CHF (congestive heart failure)     Clinical diagnosis of COVID-19 2022    Colitis     Cough     Cryoglobulinemia     Dyspnea on exertion     Exposure to hepatitis A 2018     Fall     Hyperlipidemia     Hypertension     Hyponatremia     Hypoxia     Infectious viral hepatitis     AGE 13    Left shoulder pain     Leg swelling     Lesion of lung     Malignant hyperthermia due to anesthesia     Mild tricuspid regurgitation     MR (mitral regurgitation)     mild    MVP (mitral valve prolapse)     Permanent atrial fibrillation     Pneumonia of both lungs due to infectious organism 05/31/2017    Followed BY ID for MAC      Pneumonia with the fungal infection aspergillosis 01/06/2017    Pneumothorax     SOB (shortness of breath)     Syncope 07/2024    UTI (urinary tract infection)     Wheeze     mild     Past Surgical History:   Procedure Laterality Date    BRONCHOSCOPY N/A 11/12/2016    Procedure: BRONCHOSCOPY WITH FLUORO, BRUSHINGS, BAL, AND BIOPSIES;  Surgeon: Rogelio Tucker MD;  Location: [facility];  Service:     BRONCHOSCOPY Bilateral 06/03/2017    Procedure: BRONCHOSCOPY with BAL ;  Surgeon: Sung King MD;  Location: [facility];  Service:     BRONCHOSCOPY N/A 12/17/2019    Procedure: BRONCHOSCOPY WITH WASHINGS;  Surgeon: Rogelio Tucker MD;  Location: [facility];  Service: Pulmonary    BRONCHOSCOPY N/A 07/15/2022    Procedure: BRONCHOSCOPY;  Surgeon: Rogelio Tucker MD;  Location: [facility];  Service: Pulmonary;  Laterality: N/A;  Pre: Pneumonia  Post: Pneumonia    BRONCHOSCOPY N/A 10/2/2023    Procedure: BRONCHOSCOPY WITH BRONCHIAL AVEOLAR LAVAGE;  Surgeon: Rogelio Tucker MD;  Location: [facility];  Service: Pulmonary;  Laterality: N/A;  PRE:BRONCHIECTASIS /   POST: SAME    CATARACT EXTRACTION EXTRACAPSULAR W/ INTRAOCULAR LENS IMPLANTATION      COLONOSCOPY      2013    D & C WITH SUCTION      HYSTERECTOMY      KNEE ARTHROSCOPY Left     Partial      General Information       Row Name 12/24/24 0915          Physical Therapy Time and Intention    Document Type evaluation  -     Mode of Treatment individual therapy;physical therapy  -       Row Name 12/24/24 0915           General Information    Patient Profile Reviewed yes  -     Prior Level of Function independent:  -     Existing Precautions/Restrictions spinal  recent compression fx, no brace in the room  -     Barriers to Rehab none identified  -       Row Name 12/24/24 0915          Living Environment    People in Home facility resident  -       Row Name 12/24/24 0915          Cognition    Orientation Status (Cognition) oriented to;person  -       Row Name 12/24/24 0915          Safety Issues/Impairments Affecting Functional Mobility    Impairments Affecting Function (Mobility) endurance/activity tolerance;strength  -               User Key  (r) = Recorded By, (t) = Taken By, (c) = Cosigned By      Initials Name Provider Type    Cori Smith, PT Physical Therapist                   Mobility       Row Name 12/24/24 0916          Bed Mobility    Comment, (Bed Mobility) up in bathroom  -       Row Name 12/24/24 0916          Sit-Stand Transfer    Sit-Stand [place] (Transfers) contact guard  -     Assistive Device (Sit-Stand Transfers) walker, front-wheeled  -       Row Name 12/24/24 0916          Gait/Stairs (Locomotion)    [place] Level (Gait) contact guard  -     Assistive Device (Gait) walker, front-wheeled  -     Distance in Feet (Gait) 30  -     Deviations/Abnormal Patterns (Gait) gait speed decreased  -               User Key  (r) = Recorded By, (t) = Taken By, (c) = Cosigned By      Initials Name Provider Type    Cori Smith, PT Physical Therapist                   Obj/Interventions       Row Name 12/24/24 0917          Range of Motion Comprehensive    Comment, General Range of Motion WFL  -       Row Name 12/24/24 0917          Strength Comprehensive (MMT)    Comment, General Manual Muscle Testing (MMT) Assessment generalized weakness, but functional against gravity  -       Row Name 12/24/24 0917          Balance    Balance Assessment sitting  static balance;sitting dynamic balance;standing static balance;standing dynamic balance  -KH     Static Sitting Balance standby assist  -KH     Dynamic Sitting Balance standby assist  -KH     Position, Sitting Balance sitting in chair  -KH     Static Standing Balance standby assist  -KH     Dynamic Standing Balance contact guard  -KH     Position/Device Used, Standing Balance walker, rolling  -KH               User Key  (r) = Recorded By, (t) = Taken By, (c) = Cosigned By      Initials Name Provider Type    Cori Smith, PT Physical Therapist                   Goals/Plan       Row Name 12/24/24 0926          Bed Mobility Goal 1 (PT)    Activity/Assistive Device (Bed Mobility Goal 1, PT) bed mobility activities, all  -KH     [place] Level/Cues Needed (Bed Mobility Goal 1, PT) supervision required  -KH     Time Frame (Bed Mobility Goal 1, PT) 10 days  -       Row Name 12/24/24 0926          Transfer Goal 1 (PT)    Activity/Assistive Device (Transfer Goal 1, PT) sit-to-stand/stand-to-sit;bed-to-chair/chair-to-bed;walker, rolling  -KH     [place] Level/Cues Needed (Transfer Goal 1, PT) standby assist  -KH     Time Frame (Transfer Goal 1, PT) 10 days  -       Row Name 12/24/24 0926          Gait Training Goal 1 (PT)    Activity/Assistive Device (Gait Training Goal 1, PT) gait (walking locomotion);walker, rolling  -KH     [place] Level (Gait Training Goal 1, PT) standby assist  -KH     Distance (Gait Training Goal 1, PT) 100 ft  -     Time Frame (Gait Training Goal 1, PT) 10 days  -       Row Name 12/24/24 0926          Patient Education Goal (PT)    Activity (Patient Education Goal, PT) HEP  -KH     [place]/Cues/Accuracy (Memory Goal 2, PT) demonstrates adequately  -KH     Time Frame (Patient Education Goal, PT) 10 days  -       Row Name 12/24/24 0926          Therapy Assessment/Plan (PT)    Planned Therapy Interventions (PT) bed mobility training;gait training;home exercise  program;patient/family education;transfer training;strengthening;balance training  -               User Key  (r) = Recorded By, (t) = Taken By, (c) = Cosigned By      Initials Name Provider Type    Cori Smith, PT Physical Therapist                   Clinical Impression       Row Name 12/24/24 0917          Pain    Pretreatment Pain Rating 0/10 - no pain  -     Posttreatment Pain Rating 0/10 - no pain  -       Row Name 12/24/24 0917          Plan of Care Review    Plan of Care Reviewed With patient  -     Outcome Evaluation Patient was readmitted from assisted living with shortness of air and cough.  Patient was recently admitted to the hospital with compression fracture and was using a TLSO for comfort.  No brace present in the room today.  Patient was up in the bathroom when PT arrived.  Patient required contact-guard assist to stand and ambulate a short distance in the room with rolling walker.  Further ambulation distance was deferred as patient was eager to get to the chair for breakfast.  She stood with standby assist at the sink to wash her hands.  Patient presents with generalized weakness, decreased endurance and decreased functional mobility.  She would benefit from PT to address these impairments.  Anticipate the patient should be able to return to assisted living.  Patient was educated on importance of ambulating multiple times a day while admitted with nursing supervision.  -       Row Name 12/24/24 0917          Therapy Assessment/Plan (PT)    Patient/Family Therapy Goals Statement (PT) pt is unsure  -     Rehab Potential (PT) good  -     Criteria for Skilled Interventions Met (PT) yes  -KH     Therapy Frequency (PT) 5 times/wk  -       Row Name 12/24/24 0917          Vital Signs    O2 Delivery Pre Treatment supplemental O2  -KH     Intra SpO2 (%) 90  -KH     O2 Delivery Intra Treatment supplemental O2  -KH     Post SpO2 (%) 94  -KH     O2 Delivery Post Treatment  supplemental O2  -       Row Name 12/24/24 0917          Positioning and Restraints    Pre-Treatment Position bathroom  -     Post Treatment Position chair  -     In Chair reclined;call light within reach;exit alarm on;notified nsg  -               User Key  (r) = Recorded By, (t) = Taken By, (c) = Cosigned By      Initials Name Provider Type    Cori Smith, PT Physical Therapist                   Outcome Measures       Row Name 12/24/24 0927 12/23/24 2201       How much help from another person do you currently need...    Turning from your back to your side while in flat bed without using bedrails? 3  -KH 1  -JA    Moving from lying on back to sitting on the side of a flat bed without bedrails? 3  -KH 1  -JA    Moving to and from a bed to a chair (including a wheelchair)? 3  -KH 1  -JA    Standing up from a chair using your arms (e.g., wheelchair, bedside chair)? 3  -KH 1  -JA    Climbing 3-5 steps with a railing? 2  -KH 1  -JA    To walk in hospital room? 3  -KH 1  -JA    AM-PAC 6 Clicks Score (PT) 17  -KH 6  -JA    Highest Level of Mobility Goal 5 --> Static standing  -KH 2 --> Bed activities/dependent transfer  -JA      Row Name 12/24/24 0927          Functional Assessment    Outcome Measure Options AM-PAC 6 Clicks Basic Mobility (PT)  -               User Key  (r) = Recorded By, (t) = Taken By, (c) = Cosigned By      Initials Name Provider Type    Cori Smith, PT Physical Therapist    Jodie Cordova, RN Registered Nurse                                 Physical Therapy Education       Title: PT OT SLP Therapies (In Progress)       Topic: Physical Therapy (Not Started)       Point: Mobility training (Not Started)       Learner Progress:  Not documented in this visit.              Point: Home exercise program (Not Started)       Learner Progress:  Not documented in this visit.              Point: Body mechanics (Not Started)       Learner Progress:  Not documented in this  visit.              Point: Precautions (Not Started)       Learner Progress:  Not documented in this visit.                                  PT Recommendation and Plan  Planned Therapy Interventions (PT): bed mobility training, gait training, home exercise program, patient/family education, transfer training, strengthening, balance training  Outcome Evaluation: Patient was readmitted from assisted living with shortness of air and cough.  Patient was recently admitted to the hospital with compression fracture and was using a TLSO for comfort.  No brace present in the room today.  Patient was up in the bathroom when PT arrived.  Patient required contact-guard assist to stand and ambulate a short distance in the room with rolling walker.  Further ambulation distance was deferred as patient was eager to get to the chair for breakfast.  She stood with standby assist at the sink to wash her hands.  Patient presents with generalized weakness, decreased endurance and decreased functional mobility.  She would benefit from PT to address these impairments.  Anticipate the patient should be able to return to assisted living.  Patient was educated on importance of ambulating multiple times a day while admitted with nursing supervision.     Time Calculation:         PT Charges       Row Name 12/24/24 0927             Time Calculation    Start Time 0820  -      Stop Time 0840  -      Time Calculation (min) 20 min  -KH      PT Received On 12/24/24  -      PT - Next Appointment 12/26/24  -      PT Goal Re-Cert Due Date 01/03/25  -                User Key  (r) = Recorded By, (t) = Taken By, (c) = Cosigned By      Initials Name Provider Type    Cori Smith, PT Physical Therapist                  Therapy Charges for Today       Code Description Service Date Service Provider Modifiers Qty    56656269428 HC PT EVAL MOD COMPLEXITY 3 12/24/2024 Cori Figueredo, PT GP 1            PT G-Codes  Outcome Measure  Options: AM-PAC 6 Clicks Basic Mobility (PT)  AM-PAC 6 Clicks Score (PT): 17  PT Discharge Summary  Anticipated Discharge Disposition (PT): assisted living    Cori Figueredo, PT  12/24/2024

## 2024-12-25 LAB
ALBUMIN SERPL-MCNC: 3.4 G/DL (ref 3.5–5.2)
ALBUMIN/GLOB SERPL: 1.2 G/DL
ALP SERPL-CCNC: 127 U/L (ref 39–117)
ALT SERPL W P-5'-P-CCNC: 32 U/L (ref 1–33)
ANION GAP SERPL CALCULATED.3IONS-SCNC: 8.8 MMOL/L (ref 5–15)
AST SERPL-CCNC: 28 U/L (ref 1–32)
BILIRUB SERPL-MCNC: 0.4 MG/DL (ref 0–1.2)
BUN SERPL-MCNC: 28 MG/DL (ref 8–23)
BUN/CREAT SERPL: 28.9 (ref 7–25)
CALCIUM SPEC-SCNC: 9.5 MG/DL (ref 8.2–9.6)
CHLORIDE SERPL-SCNC: 100 MMOL/L (ref 98–107)
CO2 SERPL-SCNC: 29.2 MMOL/L (ref 22–29)
CREAT SERPL-MCNC: 0.97 MG/DL (ref 0.57–1)
DEPRECATED RDW RBC AUTO: 49.9 FL (ref 37–54)
EGFRCR SERPLBLD CKD-EPI 2021: 54.3 ML/MIN/1.73
ERYTHROCYTE [DISTWIDTH] IN BLOOD BY AUTOMATED COUNT: 13.8 % (ref 12.3–15.4)
GLOBULIN UR ELPH-MCNC: 2.9 GM/DL
GLUCOSE SERPL-MCNC: 162 MG/DL (ref 65–99)
HCT VFR BLD AUTO: 29.2 % (ref 34–46.6)
HGB BLD-MCNC: 9.5 G/DL (ref 12–15.9)
MCH RBC QN AUTO: 32.4 PG (ref 26.6–33)
MCHC RBC AUTO-ENTMCNC: 32.5 G/DL (ref 31.5–35.7)
MCV RBC AUTO: 99.7 FL (ref 79–97)
PLATELET # BLD AUTO: 298 10*3/MM3 (ref 140–450)
PMV BLD AUTO: 10.2 FL (ref 6–12)
POTASSIUM SERPL-SCNC: 4.3 MMOL/L (ref 3.5–5.2)
PROT SERPL-MCNC: 6.3 G/DL (ref 6–8.5)
RBC # BLD AUTO: 2.93 10*6/MM3 (ref 3.77–5.28)
SODIUM SERPL-SCNC: 138 MMOL/L (ref 136–145)
WBC NRBC COR # BLD AUTO: 13.98 10*3/MM3 (ref 3.4–10.8)

## 2024-12-25 PROCEDURE — 94664 DEMO&/EVAL PT USE INHALER: CPT

## 2024-12-25 PROCEDURE — 25010000002 CEFTRIAXONE PER 250 MG: Performed by: INTERNAL MEDICINE

## 2024-12-25 PROCEDURE — 99232 SBSQ HOSP IP/OBS MODERATE 35: CPT | Performed by: PHYSICIAN ASSISTANT

## 2024-12-25 PROCEDURE — 85027 COMPLETE CBC AUTOMATED: CPT | Performed by: STUDENT IN AN ORGANIZED HEALTH CARE EDUCATION/TRAINING PROGRAM

## 2024-12-25 PROCEDURE — 25010000002 FUROSEMIDE PER 20 MG: Performed by: INTERNAL MEDICINE

## 2024-12-25 PROCEDURE — 80053 COMPREHEN METABOLIC PANEL: CPT | Performed by: STUDENT IN AN ORGANIZED HEALTH CARE EDUCATION/TRAINING PROGRAM

## 2024-12-25 PROCEDURE — 94761 N-INVAS EAR/PLS OXIMETRY MLT: CPT

## 2024-12-25 PROCEDURE — 25010000002 METHYLPREDNISOLONE PER 40 MG: Performed by: INTERNAL MEDICINE

## 2024-12-25 PROCEDURE — 94799 UNLISTED PULMONARY SVC/PX: CPT

## 2024-12-25 RX ORDER — PREDNISONE 20 MG/1
40 TABLET ORAL
Status: DISCONTINUED | OUTPATIENT
Start: 2024-12-26 | End: 2024-12-27 | Stop reason: HOSPADM

## 2024-12-25 RX ADMIN — GUAIFENESIN 600 MG: 600 TABLET, MULTILAYER, EXTENDED RELEASE ORAL at 20:55

## 2024-12-25 RX ADMIN — IPRATROPIUM BROMIDE AND ALBUTEROL SULFATE 3 ML: 2.5; .5 SOLUTION RESPIRATORY (INHALATION) at 13:27

## 2024-12-25 RX ADMIN — METHYLPREDNISOLONE SODIUM SUCCINATE 20 MG: 40 INJECTION, POWDER, FOR SOLUTION INTRAMUSCULAR; INTRAVENOUS at 16:18

## 2024-12-25 RX ADMIN — POTASSIUM CHLORIDE 20 MEQ: 750 TABLET, EXTENDED RELEASE ORAL at 09:24

## 2024-12-25 RX ADMIN — GUAIFENESIN 600 MG: 600 TABLET, MULTILAYER, EXTENDED RELEASE ORAL at 09:24

## 2024-12-25 RX ADMIN — SERTRALINE HYDROCHLORIDE 25 MG: 25 TABLET ORAL at 09:24

## 2024-12-25 RX ADMIN — BUDESONIDE AND FORMOTEROL FUMARATE DIHYDRATE 2 PUFF: 160; 4.5 AEROSOL RESPIRATORY (INHALATION) at 07:08

## 2024-12-25 RX ADMIN — FUROSEMIDE 40 MG: 10 INJECTION, SOLUTION INTRAMUSCULAR; INTRAVENOUS at 13:15

## 2024-12-25 RX ADMIN — APIXABAN 2.5 MG: 2.5 TABLET, FILM COATED ORAL at 20:55

## 2024-12-25 RX ADMIN — HYDROCODONE BITARTRATE AND ACETAMINOPHEN 1 TABLET: 5; 325 TABLET ORAL at 20:55

## 2024-12-25 RX ADMIN — APIXABAN 2.5 MG: 2.5 TABLET, FILM COATED ORAL at 09:24

## 2024-12-25 RX ADMIN — IPRATROPIUM BROMIDE AND ALBUTEROL SULFATE 3 ML: 2.5; .5 SOLUTION RESPIRATORY (INHALATION) at 19:28

## 2024-12-25 RX ADMIN — Medication 4 ML: at 07:05

## 2024-12-25 RX ADMIN — CARVEDILOL 6.25 MG: 6.25 TABLET, FILM COATED ORAL at 18:48

## 2024-12-25 RX ADMIN — BUDESONIDE AND FORMOTEROL FUMARATE DIHYDRATE 2 PUFF: 160; 4.5 AEROSOL RESPIRATORY (INHALATION) at 19:31

## 2024-12-25 RX ADMIN — CARVEDILOL 6.25 MG: 6.25 TABLET, FILM COATED ORAL at 09:24

## 2024-12-25 RX ADMIN — Medication 1 TABLET: at 20:55

## 2024-12-25 RX ADMIN — CEFTRIAXONE SODIUM 1000 MG: 1 INJECTION, POWDER, FOR SOLUTION INTRAMUSCULAR; INTRAVENOUS at 20:55

## 2024-12-25 RX ADMIN — FERROUS SULFATE TAB 325 MG (65 MG ELEMENTAL FE) 325 MG: 325 (65 FE) TAB at 09:24

## 2024-12-25 RX ADMIN — IPRATROPIUM BROMIDE AND ALBUTEROL SULFATE 3 ML: 2.5; .5 SOLUTION RESPIRATORY (INHALATION) at 07:00

## 2024-12-25 RX ADMIN — Medication 4 ML: at 19:33

## 2024-12-25 RX ADMIN — DOXYCYCLINE 100 MG: 100 CAPSULE ORAL at 09:24

## 2024-12-25 RX ADMIN — DOXYCYCLINE 100 MG: 100 CAPSULE ORAL at 20:55

## 2024-12-25 RX ADMIN — Medication 1 TABLET: at 09:24

## 2024-12-25 RX ADMIN — METHYLPREDNISOLONE SODIUM SUCCINATE 20 MG: 40 INJECTION, POWDER, FOR SOLUTION INTRAMUSCULAR; INTRAVENOUS at 09:24

## 2024-12-25 NOTE — PLAN OF CARE
Problem: Adult Inpatient Plan of Care  Goal: Plan of Care Review  Flowsheets (Taken 12/25/2024 0440)  Progress: no change  Outcome Evaluation: No changes. Very pleasant. Makes needs known. AOx2, disoriented to time and situation. No c/o pain or discomfort. Call light within reach.  Plan of Care Reviewed With: patient  Goal: Absence of Hospital-Acquired Illness or Injury  Intervention: Identify and Manage Fall Risk  Flowsheets  Taken 12/25/2024 0439  Safety Promotion/Fall Prevention:   activity supervised   assistive device/personal items within reach   clutter free environment maintained   fall prevention program maintained   lighting adjusted   nonskid shoes/slippers when out of bed   room organization consistent   safety round/check completed  Taken 12/25/2024 0216  Safety Promotion/Fall Prevention:   activity supervised   assistive device/personal items within reach   clutter free environment maintained   fall prevention program maintained   lighting adjusted   nonskid shoes/slippers when out of bed   room organization consistent   safety round/check completed  Taken 12/25/2024 0020  Safety Promotion/Fall Prevention:   activity supervised   assistive device/personal items within reach   clutter free environment maintained   fall prevention program maintained   lighting adjusted   nonskid shoes/slippers when out of bed   room organization consistent   safety round/check completed  Taken 12/24/2024 2202  Safety Promotion/Fall Prevention:   activity supervised   assistive device/personal items within reach   clutter free environment maintained   fall prevention program maintained   lighting adjusted   nonskid shoes/slippers when out of bed   room organization consistent   safety round/check completed  Taken 12/24/2024 2046  Safety Promotion/Fall Prevention:   activity supervised   assistive device/personal items within reach   clutter free environment maintained   fall prevention program maintained   lighting  adjusted   nonskid shoes/slippers when out of bed   room organization consistent   safety round/check completed  Intervention: Prevent Skin Injury  Flowsheets  Taken 12/25/2024 0439 by Armando Wilhelm RN  Body Position: position changed independently  Taken 12/25/2024 0216 by Armando Wilhelm RN  Body Position: position changed independently  Taken 12/25/2024 0020 by Armando Wilhelm RN  Body Position: position changed independently  Taken 12/24/2024 2046 by Armando Wilhelm RN  Body Position: position changed independently  Taken 12/23/2024 2125 by Jodie Barnes RN  Skin Protection: incontinence pads utilized  Intervention: Prevent and Manage VTE (Venous Thromboembolism) Risk  Flowsheets (Taken 12/24/2024 1027 by Orin Gomez, RN)  VTE Prevention/Management: (eliquis) other (see comments)  Intervention: Prevent Infection  Flowsheets (Taken 12/25/2024 0300 by Mary Beth Hatfield)  Infection Prevention:   environmental surveillance performed   rest/sleep promoted  Goal: Optimal Comfort and Wellbeing  Intervention: Provide Person-Centered Care  Flowsheets (Taken 12/24/2024 2046)  Trust Relationship/Rapport:   care explained   choices provided   emotional support provided   empathic listening provided   questions answered   questions encouraged   reassurance provided   thoughts/feelings acknowledged  Goal: Readiness for Transition of Care  Intervention: Mutually Develop Transition Plan  Flowsheets (Taken 12/24/2024 1143 by Karine Shea, RN)  Equipment Needed After Discharge: none  Equipment Currently Used at Home: (HS oxygen only through [place])   walker, rolling   oxygen  Anticipated Changes Related to Illness: none  Transportation Anticipated: family or friend will provide  Transportation Concerns: none  Concerns to be Addressed: adjustment to diagnosis/illness  Readmission Within the Last 30 Days: no previous admission in last 30 days  Patient/Family Anticipated Services at Transition: hospice care  Patient/Family Anticipates  Transition to: home     Problem: Comorbidity Management  Goal: Maintenance of COPD Symptom Control  Intervention: Maintain COPD (Chronic Obstructive Pulmonary Disease) Symptom Control  Flowsheets (Taken 12/24/2024 2046)  Medication Review/Management:   medications reviewed   high-risk medications identified  Goal: Maintenance of Heart Failure Symptom Control  Intervention: Maintain Heart Failure Management  Flowsheets (Taken 12/24/2024 2046)  Medication Review/Management:   medications reviewed   high-risk medications identified  Goal: Blood Pressure in Desired Range  Intervention: Maintain Blood Pressure Management  Flowsheets (Taken 12/24/2024 2046)  Medication Review/Management:   medications reviewed   high-risk medications identified     Problem: Skin Injury Risk Increased  Goal: Skin Health and Integrity  Intervention: Optimize Skin Protection  Flowsheets  Taken 12/25/2024 0439 by Armando Wilhelm RN  Activity Management: activity encouraged  Head of Bed (HOB) Positioning: HOB elevated  Taken 12/25/2024 0216 by Armando Wilhelm RN  Activity Management: activity encouraged  Head of Bed (HOB) Positioning: HOB elevated  Taken 12/25/2024 0020 by Armando Wilhelm RN  Activity Management: back to bed  Head of Bed (HOB) Positioning: HOB elevated  Taken 12/24/2024 2202 by Armando Wilhelm RN  Activity Management: activity encouraged  Head of Bed (HOB) Positioning:   HOB elevated   HOB at 20-30 degrees  Taken 12/24/2024 2046 by Armando Wilhelm RN  Activity Management: back to bed  Head of Bed (HOB) Positioning:   HOB elevated   HOB at 20-30 degrees  Taken 12/24/2024 1027 by Oirn Gomez, DOLORES  Pressure Reduction Techniques:   frequent weight shift encouraged   weight shift assistance provided  Pressure Reduction Devices: positioning supports utilized  Taken 12/23/2024 2125 by Jodie Barnes, RN  Skin Protection: incontinence pads utilized     Problem: Fall Injury Risk  Goal: Absence of Fall and Fall-Related Injury  Intervention: Identify and  Manage Contributors  Flowsheets (Taken 12/24/2024 2046)  Medication Review/Management:   medications reviewed   high-risk medications identified  Intervention: Promote Injury-Free Environment  Flowsheets  Taken 12/25/2024 0439  Safety Promotion/Fall Prevention:   activity supervised   assistive device/personal items within reach   clutter free environment maintained   fall prevention program maintained   lighting adjusted   nonskid shoes/slippers when out of bed   room organization consistent   safety round/check completed  Taken 12/25/2024 0216  Safety Promotion/Fall Prevention:   activity supervised   assistive device/personal items within reach   clutter free environment maintained   fall prevention program maintained   lighting adjusted   nonskid shoes/slippers when out of bed   room organization consistent   safety round/check completed  Taken 12/25/2024 0020  Safety Promotion/Fall Prevention:   activity supervised   assistive device/personal items within reach   clutter free environment maintained   fall prevention program maintained   lighting adjusted   nonskid shoes/slippers when out of bed   room organization consistent   safety round/check completed  Taken 12/24/2024 2202  Safety Promotion/Fall Prevention:   activity supervised   assistive device/personal items within reach   clutter free environment maintained   fall prevention program maintained   lighting adjusted   nonskid shoes/slippers when out of bed   room organization consistent   safety round/check completed  Taken 12/24/2024 2046  Safety Promotion/Fall Prevention:   activity supervised   assistive device/personal items within reach   clutter free environment maintained   fall prevention program maintained   lighting adjusted   nonskid shoes/slippers when out of bed   room organization consistent   safety round/check completed     Problem: Sepsis/Septic Shock  Goal: Optimal Coping  Intervention: Support Patient and Family Response  Flowsheets  (Taken 12/24/2024 2046)  Supportive Measures:   active listening utilized   relaxation techniques promoted  Family/Support System Care:   self-care encouraged   support provided  Goal: Absence of Infection Signs and Symptoms  Intervention: Initiate Sepsis Management  Flowsheets (Taken 12/25/2024 0300 by Mary Beth Hatfield)  Infection Prevention:   environmental surveillance performed   rest/sleep promoted  Intervention: Promote Recovery  Flowsheets  Taken 12/25/2024 0439  Activity Management: activity encouraged  Taken 12/25/2024 0216  Activity Management: activity encouraged  Taken 12/25/2024 0020  Activity Management: back to bed  Taken 12/24/2024 2202  Activity Management: activity encouraged  Taken 12/24/2024 2046  Activity Management: back to bed  Sleep/Rest Enhancement:   awakenings minimized   relaxation techniques promoted   Goal Outcome Evaluation:  Plan of Care Reviewed With: patient        Progress: no change  Outcome Evaluation: No changes. Very pleasant. Makes needs known. AOx2, disoriented to time and situation. No c/o pain or discomfort. Call light within reach.

## 2024-12-25 NOTE — PROGRESS NOTES
Name: Agnieszka Rizzo ADMIT: 2024   : 1930  PCP: Matt Rose MD    MRN: [id number] LOS: 1 days   AGE/SEX: 89 y.o. female  ROOM: [facility]     Subjective   Subjective   Chief Complaint   Patient presents with    Shortness of Breath     Shortness of breath improving some.  She was on 2 L when I saw her.  She is a limited historian but was answering simple questions appropriately.  Not reporting any chest pain nausea vomiting or abdominal pain.     Objective   Objective   Vital Signs  Temp:  [97.5 °F (36.4 °C)-98.6 °F (37 °C)] 97.5 °F (36.4 °C)  Heart Rate:  [69-97] 71  Resp:  [18] 18  BP: (126-129)/(72-78) 129/73  SpO2:  [92 %-100 %] 97 %  on  Flow (L/min) (Oxygen Therapy):  [2] 2;   Device (Oxygen Therapy): humidified;nasal cannula  Body mass index is 21.51 kg/m².    Physical Exam  Vitals and nursing note reviewed.   Constitutional:       General: She is not in acute distress.     Appearance: She is ill-appearing (chronically). She is not diaphoretic.   HENT:      Head: Atraumatic.   Cardiovascular:      Rate and Rhythm: Normal rate. Rhythm irregular.      Pulses: Normal pulses.      Heart sounds: Murmur heard.   Pulmonary:      Effort: Pulmonary effort is normal.      Breath sounds: Rhonchi present. No wheezing.      Comments: Patient on 4L NC  Abdominal:      Palpations: Abdomen is soft.      Tenderness: There is no abdominal tenderness. There is no guarding or rebound.   Musculoskeletal:         General: No swelling.   Skin:     General: Skin is warm and dry.   Neurological:      Mental Status: She is alert.      Cranial Nerves: No cranial nerve deficit.      Motor: Weakness present.       Results Review  I reviewed the patient's new clinical results.    Results from last 7 days   Lab Units 24  0639 24  0522 24  1358 24  0504   WBC 10*3/mm3 13.98* 12.41* 16.38* 13.93*   HEMOGLOBIN g/dL 9.5* 9.9* 10.8* 10.2*   PLATELETS 10*3/mm3 298 276 226 287     Results from last 7 days  "  Lab Units 12/25/24  0639 12/24/24  0844 12/24/24  0522 12/23/24  1358   SODIUM mmol/L 138 143 141 138   POTASSIUM mmol/L 4.3 4.7 3.4* 3.5   CHLORIDE mmol/L 100 103 99 95*   CO2 mmol/L 29.2* 28.3 29.5* 30.0*   BUN mg/dL 28* 18 16 13   CREATININE mg/dL 0.97 0.87 0.77 0.77   GLUCOSE mg/dL 162* 149* 150* 139*   EGFR mL/min/1.73 54.3* 61.8 71.6 71.6     Results from last 7 days   Lab Units 12/25/24  0639 12/24/24  0844 12/23/24  1358   ALBUMIN g/dL 3.4* 3.6 3.6   BILIRUBIN mg/dL 0.4 0.5 0.6   ALK PHOS U/L 127* 148* 141*   AST (SGOT) U/L 28 32 33*   ALT (SGPT) U/L 32 38* 41*     Results from last 7 days   Lab Units 12/25/24  0639 12/24/24  0844 12/24/24  0522 12/23/24  1358   CALCIUM mg/dL 9.5 9.3 8.9 9.2   ALBUMIN g/dL 3.4* 3.6  --  3.6   MAGNESIUM mg/dL  --   --   --  2.3   PHOSPHORUS mg/dL  --   --   --  2.8     Results from last 7 days   Lab Units 12/23/24  1358   PROCALCITONIN ng/mL 0.10   LACTATE mmol/L 1.6     No results found for: \"HGBA1C\", \"POCGLU\"    XR Chest 1 View    Result Date: 12/23/2024  As described.  This report was finalized on 12/23/2024 2:59 PM by Dr. Steven Garza M.D on Workstation: [id number]       I have personally reviewed all medications:  Scheduled Medications  apixaban, 2.5 mg, Oral, Q12H  budesonide-formoterol, 2 puff, Inhalation, BID - RT  carvedilol, 6.25 mg, Oral, BID With Meals  cefTRIAXone, 1,000 mg, Intravenous, Q24H  digoxin, 125 mcg, Oral, Every Other Day  doxycycline, 100 mg, Oral, Q12H  ferrous sulfate, 325 mg, Oral, Daily  furosemide, 40 mg, Intravenous, Q12H  guaiFENesin, 600 mg, Oral, BID  ipratropium-albuterol, 3 mL, Nebulization, Q6H While Awake - RT  methylPREDNISolone sodium succinate, 20 mg, Intravenous, Q8H  multivitamin with minerals, 1 tablet, Oral, BID  potassium chloride, 20 mEq, Oral, Daily  sertraline, 25 mg, Oral, Daily  sodium chloride, 4 mL, Nebulization, BID      Infusions     Diet  Diet: Cardiac; Healthy Heart (2-3 Na+); Texture: Soft to Chew (NDD 3); Soft to " Chew: Ground Meat; Fluid Consistency: Thin (IDDSI 0)       Assessment/Plan     Active Hospital Problems    Diagnosis  POA    **Acute on chronic respiratory failure with hypoxia [J96.21]  Unknown    Generalized anxiety disorder [F41.1]  Unknown    Fluid overload [E87.70]  Yes    Anemia [D64.9]  Yes    Acute on chronic heart failure with preserved ejection fraction (HFpEF) [I50.33]  Unknown    COPD (chronic obstructive pulmonary disease) [J44.9]  Yes    Permanent atrial fibrillation [I48.21]  Yes    CAD (coronary artery disease) [I25.10]  Unknown      Resolved Hospital Problems   No resolved problems to display.       89 y.o. female admitted with Acute on chronic respiratory failure with hypoxia.    COPD with acute exacerbation: No bronchospasm on exam today.  Will transition to oral steroid.  Continue breathing treatments.  She is on antibiotics for COPD exacerbation and also pneumonia coverage.  Procalcitonin was not elevated initially but she did have leukocytosis.  She has leukocytosis now and has been on steroid for a few days.  Will repeat procalcitonin to monitor.  Blood culture no growth to date.  RVP negative.  May be able to wean to doxycycline tomorrow for antibiotic coverage.  Acute on chronic diastolic heart failure: Tolerating IV diuretics.  Cardiology following  Acute on chronic hypoxic respiratory failure: Wean as tolerated.  Secondary to above.  Chronic A-fib: Rate okay on exam.  Coreg adjustment noted.  On digoxin.  Eliquis.  Dementia: Alert and interactive.  Met with palliative care this admission and plan is for outpatient hospice referral.  Anemia: Monitoring  Hypertension: Acceptable acutely.  Monitor.  Hyperglycemia on steroid: Has been less than 200.  Weaning steroid as above.  With plan for outpatient hospice do not see utility in checking an A1c at this time.  PPX: Eliquis  Disposition: ILF with palliative/few days    Expected Discharge Date: 12/26/2024; Expected Discharge Time:      Piyush  FOREST Simpson MD  [facility]  12/25/24  11:41 EST    Dictated portions of note using Dragon dictation software.  Copied text in this note has been reviewed by me and remains accurate as of 12/25/24

## 2024-12-25 NOTE — PROGRESS NOTES
[facility]    Patient Name: Agnieszka Rizzo  :1930  89 y.o.  LOS: 1  Encounter Provider: Hayden Hernandez PA-C      Patient Care Team:  Matt Rose MD as PCP - General (Family Medicine)  Marcie Robbins MD as Cardiologist (Cardiology)  Nilesh Danielle MD as Consulting Physician (Urology)  Lina Morris RPH as Pharmacist  Lev Mckeon PharmD as Pharmacist (Pharmacy)  Rogelio Tucker MD as Consulting Physician (Pulmonary Disease)    Chief Complaint:  CHF exacerbation    Interval History: Patient feeling better.  No events overnight. Patient denies any shortness of breath, palpitations or chest pain.  No edema to her legs.       Objective   Vital Signs  Temp:  [97.5 °F (36.4 °C)-98.6 °F (37 °C)] 97.5 °F (36.4 °C)  Heart Rate:  [69-97] 71  Resp:  [18] 18  BP: (126-129)/(72-78) 129/73    Intake/Output Summary (Last 24 hours) at 2024 1009  Last data filed at 2024 0933  Gross per 24 hour   Intake 390 ml   Output 400 ml   Net -10 ml     Flowsheet Rows      Flowsheet Row First Filed Value   Admission Height --   Admission Weight 57 kg (125 lb 10.6 oz) Documented at 2024 1844              Constitutional:       Appearance: Not in distress. Chronically ill-appearing.   Pulmonary:      Effort: Pulmonary effort is normal.      Breath sounds: Examination of the right-lower field reveals decreased breath sounds. Examination of the left-lower field reveals decreased breath sounds. Decreased breath sounds present.   Cardiovascular:      Normal rate. Irregularly irregular rhythm.      Murmurs: There is no murmur.   Skin:     General: Skin is warm.           Pertinent Test Results:  Results from last 7 days   Lab Units 24  0639 24  0844 24  0522 24  1358 24  0504 24  0538   SODIUM mmol/L 138 143 141 138 141 140   POTASSIUM mmol/L 4.3 4.7 3.4* 3.5 3.6 3.5   CHLORIDE mmol/L 100 103 99 95* 101 104   CO2 mmol/L 29.2* 28.3 29.5* 30.0* 31.0* 28.8  "  BUN mg/dL 28* 18 16 13 18 24*   CREATININE mg/dL 0.97 0.87 0.77 0.77 0.72 0.87   GLUCOSE mg/dL 162* 149* 150* 139* 114* 93   CALCIUM mg/dL 9.5 9.3 8.9 9.2 9.1 9.3   AST (SGOT) U/L 28 32  --  33*  --   --    ALT (SGPT) U/L 32 38*  --  41*  --   --      Results from last 7 days   Lab Units 12/24/24  0522 12/23/24  1527 12/23/24  1358   HSTROP T ng/L 31* 43* 41*     Results from last 7 days   Lab Units 12/25/24  0639 12/24/24  0522 12/23/24  1358 12/20/24  0504 12/19/24  0538   WBC 10*3/mm3 13.98* 12.41* 16.38* 13.93* 13.22*   HEMOGLOBIN g/dL 9.5* 9.9* 10.8* 10.2* 9.6*   HEMATOCRIT % 29.2* 30.0* 33.2* 31.0* 29.5*   PLATELETS 10*3/mm3 298 276 226 287 294     Results from last 7 days   Lab Units 12/23/24  1358   INR  1.28*   APTT seconds 28.5     Results from last 7 days   Lab Units 12/23/24  1358   MAGNESIUM mg/dL 2.3           Invalid input(s): \"LDLCALC\"  Results from last 7 days   Lab Units 12/23/24  1358   PROBNP pg/mL 3,843.0*               Medication Review:   apixaban, 2.5 mg, Oral, Q12H  budesonide-formoterol, 2 puff, Inhalation, BID - RT  carvedilol, 6.25 mg, Oral, BID With Meals  cefTRIAXone, 1,000 mg, Intravenous, Q24H  digoxin, 125 mcg, Oral, Every Other Day  doxycycline, 100 mg, Oral, Q12H  ferrous sulfate, 325 mg, Oral, Daily  furosemide, 40 mg, Intravenous, Q12H  guaiFENesin, 600 mg, Oral, BID  ipratropium-albuterol, 3 mL, Nebulization, Q6H While Awake - RT  methylPREDNISolone sodium succinate, 20 mg, Intravenous, Q8H  multivitamin with minerals, 1 tablet, Oral, BID  potassium chloride, 20 mEq, Oral, Daily  sertraline, 25 mg, Oral, Daily  sodium chloride, 4 mL, Nebulization, BID              Assessment & Plan     1.  Acute on chronic diastolic CHF (unclear trigger)  2.  Persistent atrial fibrillation with intermittent mild RVR  3.  Severe biatrial enlargement by echo on 10/6/2023  4.  Coronary artery disease  5.  Hypertension  6.  Multifactorial chronic anemia  7.  Dementia    She has had multiple " admissions for congestive heart failure.  Her last echocardiogram was over 1 year ago. Echo pending     For now, I would continue her on Lasix 40 mg IV every 12 hours.  Respiratory status improved her atrial fibrillation has been high at times, although largely appears to be controlled.  I am going to try to increase her carvedilol to 6.25 mg twice daily for better rate control.  Her digoxin level was also less than 0.30 on admission.  She was given 250 mcg of IV digoxin now in case this is contributing.  She is on Eliquis otherwise.    Palliative care saw patient yesterday.  Patient continues to have progressive decline and expressed desire to stop coming to the hospital.  Palliative care will be transition to [facility] support outpatient.      Cardiology will continue to follow.           Hayden Hernandez PA-C  [facility]   [facility]  [address] [place] [address] [address] [place] [address]  Office: [phone]    12/25/24  10:09 EST

## 2024-12-25 NOTE — CONSULTS
[facility] Visit Report    Agnieszka Rizzo  [id number]  12/25/2024    [facility] consult received and records reviewed with [facility] medical officer Dr Julian Mc. Patient is medically eligible for services at discharge--not HSB eligible at this time.     Met with patient and her daughter/PCG/POA Michelle Zaldivar at bedside. Detailed explanation of services provided for routine services upon return to [facility]. Family and patient wishes are for patient to return to her facility once arrangements have been made as she will need a higher level of care then independent living at her facility.    [facility] will continue to follow up remotely for discharge plan. Please call with any updates or change in care needs.    Thank you for allowing [facility] to participate with this patient's and family care needs.    Deborah Sharma RN   [facility] Admissions Coordinator  [phone]

## 2024-12-26 LAB
ANION GAP SERPL CALCULATED.3IONS-SCNC: 14.8 MMOL/L (ref 5–15)
BUN SERPL-MCNC: 26 MG/DL (ref 8–23)
BUN/CREAT SERPL: 27.1 (ref 7–25)
CALCIUM SPEC-SCNC: 9.8 MG/DL (ref 8.2–9.6)
CHLORIDE SERPL-SCNC: 96 MMOL/L (ref 98–107)
CO2 SERPL-SCNC: 32.2 MMOL/L (ref 22–29)
CREAT SERPL-MCNC: 0.96 MG/DL (ref 0.57–1)
DEPRECATED RDW RBC AUTO: 51.9 FL (ref 37–54)
EGFRCR SERPLBLD CKD-EPI 2021: 54.9 ML/MIN/1.73
ERYTHROCYTE [DISTWIDTH] IN BLOOD BY AUTOMATED COUNT: 13.8 % (ref 12.3–15.4)
GLUCOSE SERPL-MCNC: 110 MG/DL (ref 65–99)
HCT VFR BLD AUTO: 34.8 % (ref 34–46.6)
HGB BLD-MCNC: 10.5 G/DL (ref 12–15.9)
MAGNESIUM SERPL-MCNC: 2.6 MG/DL (ref 1.7–2.3)
MCH RBC QN AUTO: 30.8 PG (ref 26.6–33)
MCHC RBC AUTO-ENTMCNC: 30.2 G/DL (ref 31.5–35.7)
MCV RBC AUTO: 102.1 FL (ref 79–97)
PLATELET # BLD AUTO: 333 10*3/MM3 (ref 140–450)
PMV BLD AUTO: 10.2 FL (ref 6–12)
POTASSIUM SERPL-SCNC: 3.7 MMOL/L (ref 3.5–5.2)
PROCALCITONIN SERPL-MCNC: 0.07 NG/ML (ref 0–0.25)
RBC # BLD AUTO: 3.41 10*6/MM3 (ref 3.77–5.28)
SODIUM SERPL-SCNC: 143 MMOL/L (ref 136–145)
URATE SERPL-MCNC: 8.6 MG/DL (ref 2.4–5.7)
WBC NRBC COR # BLD AUTO: 14.4 10*3/MM3 (ref 3.4–10.8)

## 2024-12-26 PROCEDURE — 94761 N-INVAS EAR/PLS OXIMETRY MLT: CPT

## 2024-12-26 PROCEDURE — 94799 UNLISTED PULMONARY SVC/PX: CPT

## 2024-12-26 PROCEDURE — 83735 ASSAY OF MAGNESIUM: CPT | Performed by: INTERNAL MEDICINE

## 2024-12-26 PROCEDURE — 94664 DEMO&/EVAL PT USE INHALER: CPT

## 2024-12-26 PROCEDURE — 84550 ASSAY OF BLOOD/URIC ACID: CPT | Performed by: INTERNAL MEDICINE

## 2024-12-26 PROCEDURE — 85027 COMPLETE CBC AUTOMATED: CPT | Performed by: STUDENT IN AN ORGANIZED HEALTH CARE EDUCATION/TRAINING PROGRAM

## 2024-12-26 PROCEDURE — 99232 SBSQ HOSP IP/OBS MODERATE 35: CPT

## 2024-12-26 PROCEDURE — 63710000001 PREDNISONE PER 1 MG: Performed by: INTERNAL MEDICINE

## 2024-12-26 PROCEDURE — 80048 BASIC METABOLIC PNL TOTAL CA: CPT | Performed by: STUDENT IN AN ORGANIZED HEALTH CARE EDUCATION/TRAINING PROGRAM

## 2024-12-26 PROCEDURE — 25010000002 FUROSEMIDE PER 20 MG: Performed by: INTERNAL MEDICINE

## 2024-12-26 PROCEDURE — 25010000002 CEFTRIAXONE PER 250 MG: Performed by: INTERNAL MEDICINE

## 2024-12-26 PROCEDURE — 84145 PROCALCITONIN (PCT): CPT | Performed by: INTERNAL MEDICINE

## 2024-12-26 RX ORDER — BUMETANIDE 2 MG/1
2 TABLET ORAL DAILY
Status: DISCONTINUED | OUTPATIENT
Start: 2024-12-26 | End: 2024-12-27 | Stop reason: HOSPADM

## 2024-12-26 RX ADMIN — Medication 4 ML: at 19:56

## 2024-12-26 RX ADMIN — SERTRALINE HYDROCHLORIDE 25 MG: 25 TABLET ORAL at 08:22

## 2024-12-26 RX ADMIN — DOXYCYCLINE 100 MG: 100 CAPSULE ORAL at 08:22

## 2024-12-26 RX ADMIN — IPRATROPIUM BROMIDE AND ALBUTEROL SULFATE 3 ML: 2.5; .5 SOLUTION RESPIRATORY (INHALATION) at 14:11

## 2024-12-26 RX ADMIN — CEFTRIAXONE SODIUM 1000 MG: 1 INJECTION, POWDER, FOR SOLUTION INTRAMUSCULAR; INTRAVENOUS at 22:19

## 2024-12-26 RX ADMIN — POTASSIUM CHLORIDE 20 MEQ: 750 TABLET, EXTENDED RELEASE ORAL at 08:22

## 2024-12-26 RX ADMIN — BUMETANIDE 2 MG: 2 TABLET ORAL at 11:26

## 2024-12-26 RX ADMIN — PREDNISONE 40 MG: 20 TABLET ORAL at 08:22

## 2024-12-26 RX ADMIN — APIXABAN 2.5 MG: 2.5 TABLET, FILM COATED ORAL at 08:22

## 2024-12-26 RX ADMIN — FERROUS SULFATE TAB 325 MG (65 MG ELEMENTAL FE) 325 MG: 325 (65 FE) TAB at 08:22

## 2024-12-26 RX ADMIN — BUDESONIDE AND FORMOTEROL FUMARATE DIHYDRATE 2 PUFF: 160; 4.5 AEROSOL RESPIRATORY (INHALATION) at 19:56

## 2024-12-26 RX ADMIN — Medication 2.5 MG: at 22:19

## 2024-12-26 RX ADMIN — BUDESONIDE AND FORMOTEROL FUMARATE DIHYDRATE 2 PUFF: 160; 4.5 AEROSOL RESPIRATORY (INHALATION) at 07:14

## 2024-12-26 RX ADMIN — ACETAMINOPHEN 650 MG: 325 TABLET ORAL at 18:01

## 2024-12-26 RX ADMIN — Medication 1 TABLET: at 22:19

## 2024-12-26 RX ADMIN — IPRATROPIUM BROMIDE AND ALBUTEROL SULFATE 3 ML: 2.5; .5 SOLUTION RESPIRATORY (INHALATION) at 19:54

## 2024-12-26 RX ADMIN — APIXABAN 2.5 MG: 2.5 TABLET, FILM COATED ORAL at 22:19

## 2024-12-26 RX ADMIN — DOXYCYCLINE 100 MG: 100 CAPSULE ORAL at 22:19

## 2024-12-26 RX ADMIN — Medication 4 ML: at 07:14

## 2024-12-26 RX ADMIN — HYDROCODONE BITARTRATE AND ACETAMINOPHEN 1 TABLET: 5; 325 TABLET ORAL at 22:19

## 2024-12-26 RX ADMIN — GUAIFENESIN 600 MG: 600 TABLET, MULTILAYER, EXTENDED RELEASE ORAL at 08:22

## 2024-12-26 RX ADMIN — CARVEDILOL 6.25 MG: 6.25 TABLET, FILM COATED ORAL at 08:22

## 2024-12-26 RX ADMIN — DIGOXIN 125 MCG: 125 TABLET ORAL at 08:22

## 2024-12-26 RX ADMIN — Medication 1 TABLET: at 08:22

## 2024-12-26 RX ADMIN — IPRATROPIUM BROMIDE AND ALBUTEROL SULFATE 3 ML: 2.5; .5 SOLUTION RESPIRATORY (INHALATION) at 07:14

## 2024-12-26 RX ADMIN — ACETAMINOPHEN 650 MG: 325 TABLET ORAL at 00:10

## 2024-12-26 RX ADMIN — CARVEDILOL 6.25 MG: 6.25 TABLET, FILM COATED ORAL at 17:57

## 2024-12-26 RX ADMIN — GUAIFENESIN 600 MG: 600 TABLET, MULTILAYER, EXTENDED RELEASE ORAL at 22:19

## 2024-12-26 RX ADMIN — FUROSEMIDE 40 MG: 10 INJECTION, SOLUTION INTRAMUSCULAR; INTRAVENOUS at 00:10

## 2024-12-26 NOTE — PROGRESS NOTES
Name: Agnieszka Rizzo ADMIT: 2024   : 1930  PCP: Matt Rose MD    MRN: [id number] LOS: 2 days   AGE/SEX: 89 y.o. female  ROOM: [facility]     Subjective   Subjective   Chief Complaint   Patient presents with    Shortness of Breath     Shortness of breath improved. She is a limited historian but was answering questions appropriately.  Not reporting any chest pain nausea vomiting or abdominal pain. Discussed with family at bedside     Objective   Objective   Vital Signs  Temp:  [97.5 °F (36.4 °C)-97.7 °F (36.5 °C)] 97.7 °F (36.5 °C)  Heart Rate:  [64-95] 66  Resp:  [16-20] 20  BP: (100-136)/(54-69) 128/69  SpO2:  [91 %-100 %] 94 %  on  Flow (L/min) (Oxygen Therapy):  [2] 2;   Device (Oxygen Therapy): room air  Body mass index is 21.1 kg/m².    Physical Exam  Vitals and nursing note reviewed.   Constitutional:       General: She is not in acute distress.     Appearance: She is ill-appearing (chronically). She is not diaphoretic.   HENT:      Head: Atraumatic.   Cardiovascular:      Rate and Rhythm: Normal rate. Rhythm irregular.      Pulses: Normal pulses.      Heart sounds: Murmur heard.   Pulmonary:      Effort: Pulmonary effort is normal.      Breath sounds: Wheezing (mild end expiratory on right) present. No rhonchi.   Abdominal:      Palpations: Abdomen is soft.      Tenderness: There is no abdominal tenderness. There is no guarding or rebound.   Musculoskeletal:         General: No swelling.   Skin:     General: Skin is warm and dry.   Neurological:      Mental Status: She is alert.      Cranial Nerves: No cranial nerve deficit.      Motor: Weakness present.       Results Review  I reviewed the patient's new clinical results.    Results from last 7 days   Lab Units 24  0854 24  0639 24  0522 24  1358   WBC 10*3/mm3 14.40* 13.98* 12.41* 16.38*   HEMOGLOBIN g/dL 10.5* 9.5* 9.9* 10.8*   PLATELETS 10*3/mm3 333 230 473 226     Results from last 7 days   Lab Units  "12/26/24  0854 12/25/24  0639 12/24/24  0844 12/24/24  0522   SODIUM mmol/L 143 138 143 141   POTASSIUM mmol/L 3.7 4.3 4.7 3.4*   CHLORIDE mmol/L 96* 100 103 99   CO2 mmol/L 32.2* 29.2* 28.3 29.5*   BUN mg/dL 26* 28* 18 16   CREATININE mg/dL 0.96 0.97 0.87 0.77   GLUCOSE mg/dL 110* 162* 149* 150*   EGFR mL/min/1.73 54.9* 54.3* 61.8 71.6     Results from last 7 days   Lab Units 12/25/24  0639 12/24/24  0844 12/23/24  1358   ALBUMIN g/dL 3.4* 3.6 3.6   BILIRUBIN mg/dL 0.4 0.5 0.6   ALK PHOS U/L 127* 148* 141*   AST (SGOT) U/L 28 32 33*   ALT (SGPT) U/L 32 38* 41*     Results from last 7 days   Lab Units 12/26/24  0854 12/25/24  0639 12/24/24  0844 12/24/24  0522 12/23/24  1358   CALCIUM mg/dL 9.8* 9.5 9.3 8.9 9.2   ALBUMIN g/dL  --  3.4* 3.6  --  3.6   MAGNESIUM mg/dL 2.6*  --   --   --  2.3   PHOSPHORUS mg/dL  --   --   --   --  2.8     Results from last 7 days   Lab Units 12/26/24  0854 12/23/24  1358   PROCALCITONIN ng/mL 0.07 0.10   LACTATE mmol/L  --  1.6     No results found for: \"HGBA1C\", \"POCGLU\"    No radiology results for the last day    I have personally reviewed all medications:  Scheduled Medications  apixaban, 2.5 mg, Oral, Q12H  budesonide-formoterol, 2 puff, Inhalation, BID - RT  bumetanide, 2 mg, Oral, Daily  carvedilol, 6.25 mg, Oral, BID With Meals  cefTRIAXone, 1,000 mg, Intravenous, Q24H  digoxin, 125 mcg, Oral, Every Other Day  doxycycline, 100 mg, Oral, Q12H  ferrous sulfate, 325 mg, Oral, Daily  guaiFENesin, 600 mg, Oral, BID  ipratropium-albuterol, 3 mL, Nebulization, Q6H While Awake - RT  multivitamin with minerals, 1 tablet, Oral, BID  potassium chloride, 20 mEq, Oral, Daily  predniSONE, 40 mg, Oral, Daily With Breakfast  sertraline, 25 mg, Oral, Daily  sodium chloride, 4 mL, Nebulization, BID      Infusions     Diet  Diet: Cardiac; Healthy Heart (2-3 Na+); Texture: Soft to Chew (NDD 3); Soft to Chew: Ground Meat; Fluid Consistency: Thin (IDDSI 0)       Assessment/Plan     Active Hospital " Problems    Diagnosis  POA    **Acute on chronic respiratory failure with hypoxia [J96.21]  Unknown    Generalized anxiety disorder [F41.1]  Unknown    Fluid overload [E87.70]  Yes    Anemia [D64.9]  Yes    Acute on chronic heart failure with preserved ejection fraction (HFpEF) [I50.33]  Unknown    COPD (chronic obstructive pulmonary disease) [J44.9]  Yes    Permanent atrial fibrillation [I48.21]  Yes    CAD (coronary artery disease) [I25.10]  Unknown      Resolved Hospital Problems   No resolved problems to display.       89 y.o. female admitted with Acute on chronic respiratory failure with hypoxia.    COPD with acute exacerbation: Oral steroid.  Continue breathing treatments.  She is on antibiotics for COPD exacerbation and also pneumonia coverage.  Procalcitonin was not elevated but she did have leukocytosis.  Blood culture no growth to date.  RVP negative.    Acute on chronic diastolic heart failure: Tolerated IV diuretics.  Transitioned to oral. Cardiology following  Acute on chronic hypoxic respiratory failure: Wean as tolerated.  Secondary to above.  Chronic A-fib: Rate okay on exam.  Coreg.  On digoxin.  Eliquis.  Dementia: Alert and interactive.  Met with palliative care this admission and plan is for outpatient hospice referral.  Anemia: Monitoring  Hypertension: Acceptable acutely.  Monitor.  Hyperglycemia on steroid: Has been less than 200.  With plan for outpatient hospice do not see utility in checking an A1c at this time.  PPX: Eliquis  Disposition: ILF with palliative/possibly tomorrow    Expected Discharge Date: 12/26/2024; Expected Discharge Time:      Piyush Simpson MD  [facility]  12/26/24  12:46 EST    Dictated portions of note using Dragon dictation software.  Copied text in this note has been reviewed by me and remains accurate as of 12/26/24

## 2024-12-26 NOTE — PLAN OF CARE
Problem: Adult Inpatient Plan of Care  Goal: Plan of Care Review  Outcome: Progressing  Flowsheets (Taken 12/25/2024 2252)  Progress: improving  Plan of Care Reviewed With: patient     Problem: Adult Inpatient Plan of Care  Goal: Patient-Specific Goal (Individualized)  Outcome: Progressing     Problem: Adult Inpatient Plan of Care  Goal: Optimal Comfort and Wellbeing  Intervention: Monitor Pain and Promote Comfort  Recent Flowsheet Documentation  Taken 12/25/2024 2055 by Jodie Barnes RN  Pain Management Interventions:   care clustered   pain medication given   Goal Outcome Evaluation:  Plan of Care Reviewed With: patient   Pt is Alert Oriented X 2. No distress noted, VSS. Pt's mentation continues to improve.      Progress: improving

## 2024-12-26 NOTE — PROGRESS NOTES
LOS: 2 days   Patient Care Team:  Matt Rose MD as PCP - General (Family Medicine)  Marcie Robbins MD as Cardiologist (Cardiology)  Nilesh Danielle MD as Consulting Physician (Urology)  Lina Morris TG as Pharmacist  Lev Mckeon, Kevin as Pharmacist (Pharmacy)  Rogelio Tucker MD as Consulting Physician (Pulmonary Disease)    Chief Complaint: follow up CHF     Interval History: She was resting in bed on my exam. She is confused, asking repeatedly where she is. She denies chest pain or shortness of breath.     Vital Signs:  Temp:  [97.5 °F (36.4 °C)-97.7 °F (36.5 °C)] 97.7 °F (36.5 °C)  Heart Rate:  [64-95] 66  Resp:  [16-20] 20  BP: (100-136)/(54-69) 128/69    Intake/Output Summary (Last 24 hours) at 12/26/2024 1006  Last data filed at 12/26/2024 0602  Gross per 24 hour   Intake 610 ml   Output 1050 ml   Net -440 ml        Physical Exam  Vitals reviewed.   Constitutional:       General: She is not in acute distress.  HENT:      Head: Normocephalic.   Eyes:      Extraocular Movements: Extraocular movements intact.      Pupils: Pupils are equal, round, and reactive to light.   Cardiovascular:      Rate and Rhythm: Rhythm irregularly irregular.      Pulses: Normal pulses.      Heart sounds: S1 normal and S2 normal. Heart sounds not distant. Murmur heard.      No friction rub. No gallop. No S3 or S4 sounds.   Pulmonary:      Effort: Pulmonary effort is normal.      Breath sounds: Normal breath sounds.   Abdominal:      General: Abdomen is flat. Bowel sounds are normal.      Palpations: Abdomen is soft.      Tenderness: There is no abdominal tenderness.   Skin:     General: Skin is warm and dry.   Neurological:      General: No focal deficit present.      Mental Status: She is alert and oriented to person, place, and time.   Psychiatric:         Mood and Affect: Mood normal.         Behavior: Behavior normal.         Results Review:    Results from last 7 days   Lab Units 12/26/24  0854   SODIUM mmol/L  143   POTASSIUM mmol/L 3.7   CHLORIDE mmol/L 96*   CO2 mmol/L 32.2*   BUN mg/dL 26*   CREATININE mg/dL 0.96   GLUCOSE mg/dL 110*   CALCIUM mg/dL 9.8*     Results from last 7 days   Lab Units 12/24/24  0522 12/23/24  1527 12/23/24  1358   HSTROP T ng/L 31* 43* 41*     Results from last 7 days   Lab Units 12/26/24  0854   WBC 10*3/mm3 14.40*   HEMOGLOBIN g/dL 10.5*   HEMATOCRIT % 34.8   PLATELETS 10*3/mm3 333     Results from last 7 days   Lab Units 12/23/24  1358   INR  1.28*   APTT seconds 28.5         Results from last 7 days   Lab Units 12/26/24  0854   MAGNESIUM mg/dL 2.6*           I reviewed the patient's new clinical results.        Assessment & Plan:  COPD with acute exacerbation   Acute on chronic diastolic CHF  She has had multiple admissions this year for congestive heart failure.   Will defer echocardiogram as she plans to transition to hospice care at discharge  Has been receiving IV furosemide 40 mg twice daily, will transition back to home diuretic regimen.   Persistent atrial fibrillation   With intermittent RVR  Overall rate well controlled, carvedilol was increased to 6.25 mg twice daily earlier this admission  Continue digoxin and apixaban   Severe biatrial enlargement   By echocardiogram on 10/6/23  Coronary artery disease  Hypertension  Multifactorial chronic anemia  Dementia     Nelia Haq, PJ  12/26/24  10:06 EST

## 2024-12-26 NOTE — CASE MANAGEMENT/SOCIAL WORK
Continued Stay Note  [facility]     Patient Name: Agnieszka Rizzo  MRN: [id number]  Today's Date: 12/26/2024    Admit Date: 12/23/2024    Plan: Home with hospice   Discharge Plan       Row Name 12/26/24 1706       Plan    Plan Home with hospice    Patient/Family in Agreement with Plan yes    Plan Comments CCP reviewed notes, planning for dc tomorrow to pt ILF with increased level of care, updated hospice RN Christine of plan to dc, she will plan to discuss needs with her team/ pt family and will go from there.   Discussed with pt dtr Michelle, discussed plan for dc, plans to return to [facility] with elevated level of care, she states [facility] can help coordinate that, and she was under the impression pt would need to go by EMS, pt ambulatory and would not qualify for EMS transport. Michelle interested in pt going by w/c van requested for after 2 pm tomorrow. CCP will follow - Karine ORTIZ                   Discharge Codes    No documentation.                 Expected Discharge Date and Time       Expected Discharge Date Expected Discharge Time    Dec 27, 2024               Karine Shea RN

## 2024-12-26 NOTE — PLAN OF CARE
Goal Outcome Evaluation:      Plan is for patient to d/c to long term care facility and have hospice services outpatient.

## 2024-12-27 VITALS
SYSTOLIC BLOOD PRESSURE: 152 MMHG | DIASTOLIC BLOOD PRESSURE: 76 MMHG | TEMPERATURE: 97.3 F | WEIGHT: 119.93 LBS | OXYGEN SATURATION: 100 % | BODY MASS INDEX: 21.24 KG/M2 | HEART RATE: 68 BPM | RESPIRATION RATE: 20 BRPM

## 2024-12-27 LAB
ANION GAP SERPL CALCULATED.3IONS-SCNC: 10 MMOL/L (ref 5–15)
BUN SERPL-MCNC: 24 MG/DL (ref 8–23)
BUN/CREAT SERPL: 28.2 (ref 7–25)
CALCIUM SPEC-SCNC: 9.3 MG/DL (ref 8.2–9.6)
CHLORIDE SERPL-SCNC: 100 MMOL/L (ref 98–107)
CO2 SERPL-SCNC: 31 MMOL/L (ref 22–29)
CREAT SERPL-MCNC: 0.85 MG/DL (ref 0.57–1)
DEPRECATED RDW RBC AUTO: 48.1 FL (ref 37–54)
EGFRCR SERPLBLD CKD-EPI 2021: 63.6 ML/MIN/1.73
ERYTHROCYTE [DISTWIDTH] IN BLOOD BY AUTOMATED COUNT: 13.6 % (ref 12.3–15.4)
GLUCOSE SERPL-MCNC: 86 MG/DL (ref 65–99)
HCT VFR BLD AUTO: 32 % (ref 34–46.6)
HGB BLD-MCNC: 10.4 G/DL (ref 12–15.9)
MAGNESIUM SERPL-MCNC: 2.5 MG/DL (ref 1.7–2.3)
MCH RBC QN AUTO: 31.7 PG (ref 26.6–33)
MCHC RBC AUTO-ENTMCNC: 32.5 G/DL (ref 31.5–35.7)
MCV RBC AUTO: 97.6 FL (ref 79–97)
PLATELET # BLD AUTO: 332 10*3/MM3 (ref 140–450)
PMV BLD AUTO: 9.8 FL (ref 6–12)
POTASSIUM SERPL-SCNC: 3.8 MMOL/L (ref 3.5–5.2)
RBC # BLD AUTO: 3.28 10*6/MM3 (ref 3.77–5.28)
SODIUM SERPL-SCNC: 141 MMOL/L (ref 136–145)
URATE SERPL-MCNC: 8.3 MG/DL (ref 2.4–5.7)
WBC NRBC COR # BLD AUTO: 11.01 10*3/MM3 (ref 3.4–10.8)

## 2024-12-27 PROCEDURE — 99232 SBSQ HOSP IP/OBS MODERATE 35: CPT | Performed by: INTERNAL MEDICINE

## 2024-12-27 PROCEDURE — 94799 UNLISTED PULMONARY SVC/PX: CPT

## 2024-12-27 PROCEDURE — 84550 ASSAY OF BLOOD/URIC ACID: CPT | Performed by: INTERNAL MEDICINE

## 2024-12-27 PROCEDURE — 83735 ASSAY OF MAGNESIUM: CPT | Performed by: INTERNAL MEDICINE

## 2024-12-27 PROCEDURE — 85027 COMPLETE CBC AUTOMATED: CPT | Performed by: STUDENT IN AN ORGANIZED HEALTH CARE EDUCATION/TRAINING PROGRAM

## 2024-12-27 PROCEDURE — 63710000001 PREDNISONE PER 1 MG: Performed by: INTERNAL MEDICINE

## 2024-12-27 PROCEDURE — 80048 BASIC METABOLIC PNL TOTAL CA: CPT | Performed by: STUDENT IN AN ORGANIZED HEALTH CARE EDUCATION/TRAINING PROGRAM

## 2024-12-27 PROCEDURE — 94664 DEMO&/EVAL PT USE INHALER: CPT

## 2024-12-27 PROCEDURE — 94761 N-INVAS EAR/PLS OXIMETRY MLT: CPT

## 2024-12-27 RX ORDER — PREDNISONE 10 MG/1
TABLET ORAL
Qty: 18 TABLET | Refills: 0 | Status: SHIPPED | OUTPATIENT
Start: 2024-12-27 | End: 2025-01-02

## 2024-12-27 RX ORDER — CARVEDILOL 6.25 MG/1
6.25 TABLET ORAL 2 TIMES DAILY WITH MEALS
Qty: 60 TABLET | Refills: 0 | Status: SHIPPED | OUTPATIENT
Start: 2024-12-27

## 2024-12-27 RX ORDER — DOXYCYCLINE 100 MG/1
100 CAPSULE ORAL 2 TIMES DAILY
Qty: 2 CAPSULE | Refills: 0 | Status: SHIPPED | OUTPATIENT
Start: 2024-12-27 | End: 2024-12-28

## 2024-12-27 RX ORDER — IPRATROPIUM BROMIDE AND ALBUTEROL SULFATE 2.5; .5 MG/3ML; MG/3ML
3 SOLUTION RESPIRATORY (INHALATION)
Qty: 360 ML | Refills: 0 | Status: SHIPPED | OUTPATIENT
Start: 2024-12-27

## 2024-12-27 RX ORDER — HYDROCODONE BITARTRATE AND ACETAMINOPHEN 5; 325 MG/1; MG/1
.5-1 TABLET ORAL EVERY 6 HOURS PRN
Qty: 12 TABLET | Refills: 0 | Status: SHIPPED | OUTPATIENT
Start: 2024-12-27

## 2024-12-27 RX ADMIN — BUMETANIDE 2 MG: 2 TABLET ORAL at 10:07

## 2024-12-27 RX ADMIN — FERROUS SULFATE TAB 325 MG (65 MG ELEMENTAL FE) 325 MG: 325 (65 FE) TAB at 10:07

## 2024-12-27 RX ADMIN — BUDESONIDE AND FORMOTEROL FUMARATE DIHYDRATE 2 PUFF: 160; 4.5 AEROSOL RESPIRATORY (INHALATION) at 07:27

## 2024-12-27 RX ADMIN — PREDNISONE 40 MG: 20 TABLET ORAL at 10:07

## 2024-12-27 RX ADMIN — CARVEDILOL 6.25 MG: 6.25 TABLET, FILM COATED ORAL at 10:07

## 2024-12-27 RX ADMIN — Medication 1 TABLET: at 10:07

## 2024-12-27 RX ADMIN — IPRATROPIUM BROMIDE AND ALBUTEROL SULFATE 3 ML: 2.5; .5 SOLUTION RESPIRATORY (INHALATION) at 07:15

## 2024-12-27 RX ADMIN — APIXABAN 2.5 MG: 2.5 TABLET, FILM COATED ORAL at 10:07

## 2024-12-27 RX ADMIN — POTASSIUM CHLORIDE 20 MEQ: 750 TABLET, EXTENDED RELEASE ORAL at 10:07

## 2024-12-27 RX ADMIN — SERTRALINE HYDROCHLORIDE 25 MG: 25 TABLET ORAL at 10:07

## 2024-12-27 RX ADMIN — DOXYCYCLINE 100 MG: 100 CAPSULE ORAL at 10:07

## 2024-12-27 RX ADMIN — GUAIFENESIN 600 MG: 600 TABLET, MULTILAYER, EXTENDED RELEASE ORAL at 10:07

## 2024-12-27 RX ADMIN — Medication 4 ML: at 07:16

## 2024-12-27 NOTE — PLAN OF CARE
Problem: Adult Inpatient Plan of Care  Goal: Plan of Care Review  Outcome: Progressing  Flowsheets (Taken 12/27/2024 0645)  Progress: improving  Outcome Evaluation:   VSS   afib controlled on monitor   2L NC O2   up w/assist x1   poss DC today   no acute distress noted   pt slept well overnight   will cont to monitor  Plan of Care Reviewed With: patient   Goal Outcome Evaluation:  Plan of Care Reviewed With: patient        Progress: improving  Outcome Evaluation: VSS; afib controlled on monitor; 2L NC O2; up w/assist x1; poss DC today; no acute distress noted; pt slept well overnight; will cont to monitor

## 2024-12-27 NOTE — DISCHARGE SUMMARY
Date of Admission: 12/23/2024  Date of Discharge:  12/27/2024  Primary Care Physician: Matt Rose MD     Discharge Diagnosis:  Active Hospital Problems    Diagnosis  POA    **Acute on chronic respiratory failure with hypoxia [J96.21]  Yes    Generalized anxiety disorder [F41.1]  Yes    Fluid overload [E87.70]  Yes    Anemia [D64.9]  Yes    Acute on chronic heart failure with preserved ejection fraction (HFpEF) [I50.33]  Yes    COPD (chronic obstructive pulmonary disease) [J44.9]  Yes    Permanent atrial fibrillation [I48.21]  Yes    CAD (coronary artery disease) [I25.10]  Yes      Resolved Hospital Problems   No resolved problems to display.       Presenting Problem/History of Present Illness from H&P:  Acute pulmonary edema [J81.0]  Elevated brain natriuretic peptide (BNP) level [R79.89]  Acute respiratory failure with hypoxia [J96.01]  Fluid overload [E87.70]  Multifocal pneumonia [J18.9]     89 year old female presented to the emergency room with shortness of breath and cough for the last week; she resides in assisted living; she uses oxygen at night; no fever or chills; no weight gain; no nausea or vomiting; she was just discharged two days ago after a hospital stay due to a compression fracture; she has a a history of copd, chf and pulmonary hypertension;; she is not able to give any history due to memory impairment     Hospital Course:  The patient is a 89 y.o. female who presented with COPD with acute exacerbation, acute on chronic diastolic heart failure, acute on chronic hypoxic respiratory failure, chronic A-fib, dementia.  She was admitted and received steroids antibiotics and breathing treatments for the COPD exacerbation.  She received IV diuretics and cardiology evaluated.  She has responded well and has had improvement in her respiratory status.  She has been transitioned to oral steroid and oral diuretic.  She will have a steroid taper at discharge before resuming her home chronic  prednisone and needs an additional day of doxycycline to complete antibiotic course.  She met with palliative care and hospice this admission and plan is for hospice referral as an outpatient.    Exam Today:  Constitutional:       General: She is not in acute distress.     Appearance: She is ill-appearing (chronically). She is not diaphoretic.   HENT:      Head: Atraumatic.   Cardiovascular:      Rate and Rhythm: Normal rate. Rhythm irregular.      Pulses: Normal pulses.      Heart sounds: Murmur heard.   Pulmonary:      Effort: Pulmonary effort is normal.      Breath sounds: No rhonchi.   Abdominal:      Palpations: Abdomen is soft.      Tenderness: There is no abdominal tenderness. There is no guarding or rebound.   Musculoskeletal:         General: No swelling.   Skin:     General: Skin is warm and dry.   Neurological:      Mental Status: She is alert.      Cranial Nerves: No cranial nerve deficit.      Motor: Weakness present.     Results:  CXR  The heart is enlarged. Aorta is calcified. Pulmonary  vasculature is unremarkable. Likely atelectasis or scarring in the right  mid and lower lung, pulm opacifications persist. No large pleural  effusion, or pneumothorax. No acute osseous process.    Procedures Performed:         Consults:   Consults       Date and Time Order Name Status Description    12/24/2024  7:42 AM Inpatient Cardiology Consult Completed     12/23/2024  3:50 PM [facility] (on-call MD unless specified) Details      12/16/2024  9:53 PM Inpatient Neurosurgery Consult Completed     12/16/2024  8:34 PM Inpatient General Surgery Consult Completed              Discharge Disposition:  Hospice/Home    Discharge Medications:     Discharge Medications        New Medications        Instructions Start Date   doxycycline 100 MG capsule  Commonly known as: MONODOX   100 mg, Oral, 2 Times Daily             Changes to Medications        Instructions Start Date   carvedilol 6.25 MG tablet  Commonly known as: COREG  What  changed:   medication strength  how much to take   6.25 mg, Oral, 2 Times Daily With Meals      Eliquis 2.5 MG tablet tablet  Generic drug: apixaban  What changed: See the new instructions.   TAKE 1 TABLET EVERY 12 HOURS, FULL ANTICOAGULATION      FeroSul 325 (65 Fe) MG tablet  Generic drug: ferrous sulfate  What changed:   how much to take  when to take this   TAKE ONE TABLET BY MOUTH DAILY WITH BREAKFAST      HYDROcodone-acetaminophen 5-325 MG per tablet  Commonly known as: NORCO  What changed:   how much to take  reasons to take this   0.5-1 tablets, Oral, Every 6 Hours PRN      ipratropium-albuterol 0.5-2.5 mg/3 ml nebulizer  Commonly known as: DUO-NEB  What changed: when to take this   3 mL, Nebulization, 2 Times Daily - RT      predniSONE 10 MG tablet  Commonly known as: DELTASONE  What changed: See the new instructions.   Take 4 tablets by mouth Daily for 3 days, THEN 2 tablets Daily for 3 days. Resume home 10 mg p.o. daily dosing after taper   Start Date: December 27, 2024            Continue These Medications        Instructions Start Date   acetaminophen 325 MG tablet  Commonly known as: TYLENOL   2 tablets, Every 4 Hours PRN      albuterol sulfate  (90 Base) MCG/ACT inhaler  Commonly known as: PROVENTIL HFA;VENTOLIN HFA;PROAIR HFA   2 puffs, Inhalation, Every 6 Hours PRN      benzonatate 200 MG capsule  Commonly known as: TESSALON   1 capsule, 3 Times Daily PRN      bumetanide 2 MG tablet  Commonly known as: BUMEX   2 mg, Oral, Daily      digoxin 125 MCG tablet  Commonly known as: LANOXIN   125 mcg, Oral, Every Other Day, Last dose 11/17      fexofenadine 180 MG tablet  Commonly known as: ALLEGRA   1 tablet, Daily      fluticasone-salmeterol 115-21 MCG/ACT inhaler  Commonly known as: ADVAIR HFA   2 puffs, 2 Times Daily - RT      guaiFENesin 600 MG 12 hr tablet  Commonly known as: MUCINEX   1 tablet, 2 Times Daily      multivitamin with minerals tablet tablet   1 tablet, 2 Times Daily       O2  Commonly known as: OXYGEN   2 L/min, Nightly      potassium chloride 20 MEQ CR tablet  Commonly known as: KLOR-CON M20   20 mEq, Oral, Daily      sertraline 25 MG tablet  Commonly known as: ZOLOFT   1 tablet, Daily      sodium chloride 7 % nebulizer solution nebulizer solution   4 mL, Nebulization, 2 Times Daily             Stop These Medications      azithromycin 250 MG tablet  Commonly known as: ZITHROMAX              Discharge Diet:   Diet Instructions       Diet: Cardiac Diets; Healthy Heart (2-3 Na+); Soft to Chew (NDD 3); Ground Meat; Thin (IDDSI 0)      Discharge Diet: Cardiac Diets    Cardiac Diet: Healthy Heart (2-3 Na+)    Texture: Soft to Chew (NDD 3)    Soft to Chew: Ground Meat    Fluid Consistency: Thin (IDDSI 0)            Activity at Discharge:   Activity Instructions       Activity as Tolerated              Follow-up Appointments:  Additional Instructions for the Follow-ups that You Need to Schedule       Ambulatory Referral to Home Hospice   As directed      Please evaluate Agnieszka Rizzo for admission to Hospice.    Patient/Family aware of referral: yes    Services to provide: Oxygen: VIA Cannula, Mask, non-rebreather, Trilogy, Bi-Pap, C-Pap and Pain and Symptom management    Physician to follow patient's care (the person listed here will be responsible for signing ongoing orders): PCP    Referring Provider Call-back Phone Number: [phone]    Requested Start of Care Date: Within 2-3 days    Special Instructions:    Order Comments: Please evaluate gAnieszka Rizzo for admission to Hospice.  Patient/Family aware of referral: yes  Services to provide: Oxygen: VIA Cannula, Mask, non-rebreather, Trilogy, Bi-Pap, C-Pap and Pain and Symptom management  Physician to follow patient's care (the person listed here will be responsible for signing ongoing orders): PCP  Referring Provider Call-back Phone Number: [phone]  Requested Start of Care Date: Within 2-3 days  Special Instructions:                 Follow-up Information       Matt Rose MD Follow up.    Specialty: Family Medicine  Contact information:  [address] [id number] [phone]                             Test Results Pending at Discharge:  Pending Labs       Order Current Status    Blood Culture - Blood, Arm, Left Preliminary result    Blood Culture - Blood, Arm, Right Preliminary result             Piyush Simpson MD  12/27/24  11:22 EST    Time Spent on Discharge Activities: >30 minutes    Dictated portions using Dragon dictation software.

## 2024-12-27 NOTE — PLAN OF CARE
Problem: Adult Inpatient Plan of Care  Goal: Plan of Care Review  Flowsheets (Taken 12/27/2024 6833)  Outcome Evaluation: AOX1. Room air. IV removed. Discharging back to facility with [facility]. Meds to beds delivered. Discharge instructions discussed at bedside with daughter, discussed new medications and side effects, she verbalized understanding. Discharging via wheelchair van.   Goal Outcome Evaluation:              Outcome Evaluation: AOX1. Room air. IV removed. Discharging back to facility with [facility]. Meds to beds delivered. Discharge instructions discussed at bedside with daughter, discussed new medications and side effects, she verbalized understanding. Discharging via wheelchair van.

## 2024-12-27 NOTE — CASE MANAGEMENT/SOCIAL WORK
Continued Stay Note  [facility]     Patient Name: Agnieszka Rizzo  MRN: [id number]  Today's Date: 12/27/2024    Admit Date: 12/23/2024    Plan: Home with hospice   Discharge Plan       Row Name 12/27/24 1242       Plan    Plan Home with hospice    Patient/Family in Agreement with Plan yes    Plan Comments Address for [facility] [address].  Scheduled  w/c van this afternoon at 1400.   Spoke with hospice admissions, they will reach out to rounding nurse for admission assessment. Spoke with pt alfonso Martinez, she is on the way to see pt. Spoke with Vivi/Hospice, they will plan to meet with patient in her home at 1730. No further dc needs. -Karine ORTIZ                   Discharge Codes    No documentation.                 Expected Discharge Date and Time       Expected Discharge Date Expected Discharge Time    Dec 27, 2024               Karine Shea RN

## 2024-12-27 NOTE — PROGRESS NOTES
LOS: 3 days   Patient Care Team:  Matt Rose MD as PCP - General (Family Medicine)  Marcie Robbins MD as Cardiologist (Cardiology)  Nilesh Danielle MD as Consulting Physician (Urology)  Lina Morris TG as Pharmacist  Lev Mckeon PharmD as Pharmacist (Pharmacy)  Rogelio Tucker MD as Consulting Physician (Pulmonary Disease)    Chief Complaint: Follow-up acute on chronic diastolic CHF, persistent atrial fibrillation.    Interval History: Doing better.  She is tolerating the oral diuretics thus far.  No chest pain.  She denied any shortness of breath this morning.    Vital Signs:  Temp:  [97.3 °F (36.3 °C)-97.9 °F (36.6 °C)] 97.3 °F (36.3 °C)  Heart Rate:  [59-93] 68  Resp:  [16-20] 20  BP: (102-152)/(56-88) 152/76  No intake or output data in the 24 hours ending 12/27/24 1126    Physical Exam:   General Appearance:    No acute distress, alert and pleasant.   Lungs:     Clear to auscultation bilaterally     Heart:    Irregularly irregular rhythm and normal rate. II/VI SM throughout.    Abdomen:     Soft, nontender, nondistended.    Extremities:   No clubbing, cyanosis, or edema.     Results Review:    Results from last 7 days   Lab Units 12/27/24  0525   SODIUM mmol/L 141   POTASSIUM mmol/L 3.8   CHLORIDE mmol/L 100   CO2 mmol/L 31.0*   BUN mg/dL 24*   CREATININE mg/dL 0.85   GLUCOSE mg/dL 86   CALCIUM mg/dL 9.3     Results from last 7 days   Lab Units 12/24/24  0522 12/23/24  1527 12/23/24  1358   HSTROP T ng/L 31* 43* 41*     Results from last 7 days   Lab Units 12/27/24  0525   WBC 10*3/mm3 11.01*   HEMOGLOBIN g/dL 10.4*   HEMATOCRIT % 32.0*   PLATELETS 10*3/mm3 332     Results from last 7 days   Lab Units 12/23/24  1358   INR  1.28*   APTT seconds 28.5         Results from last 7 days   Lab Units 12/27/24  0525   MAGNESIUM mg/dL 2.5*           I reviewed the patient's new clinical results.        Assessment:  1.  Acute on chronic diastolic CHF (unclear trigger)  2.  Persistent atrial fibrillation  with intermittent mild RVR  3.  Severe biatrial enlargement by echo on 10/6/2023  4.  Coronary artery disease  5.  Hypertension  6.  Multifactorial chronic anemia  7.  Dementia  8.  COPD with acute exacerbation    Plan:  -Looks good this morning.  She diuresed well over the last several days.  Continue Bumex 2 mg p.o. daily at discharge.    -She is on a steroid taper for the COPD exacerbation.    -Rate is well-controlled on the Coreg and digoxin.  She is on Eliquis for the atrial fibrillation.    -She has labile blood pressure and is elderly.  She has had several episodes of hypotension.  I elected not to place her on an SGLT2 inhibitor for these reasons.    -She is stable for discharge today.  There is evidently plans for potential transition to hospice as an outpatient.    Yobany Jarquin MD  12/27/24  11:26 EST

## 2024-12-28 LAB
BACTERIA SPEC AEROBE CULT: NORMAL
BACTERIA SPEC AEROBE CULT: NORMAL

## 2024-12-30 NOTE — CASE MANAGEMENT/SOCIAL WORK
Case Management Discharge Note      Final Note: Return to [facility]. Hospice notified of discharge home         Selected Continued Care - Discharged on 12/27/2024 Admission date: 12/23/2024 - Discharge disposition: Hospice/Home      Destination    No services have been selected for the patient.                Durable Medical Equipment    No services have been selected for the patient.                Dialysis/Infusion    No services have been selected for the patient.                Home Medical Care    No services have been selected for the patient.                Therapy    No services have been selected for the patient.                Community Resources    No services have been selected for the patient.                Community & DME    No services have been selected for the patient.                    Selected Continued Care - Prior Encounters Includes continued care and service providers with selected services from prior encounters from 9/24/2024 to 12/27/2024      Discharged on 12/21/2024 Admission date: 12/16/2024 - Discharge disposition: Home or Self Care      Durable Medical Equipment       Service Provider Services Address Phone Fax Patient Preferred    [facility] [address] [address] [phone] --                          Transportation Services  W/C Van: Uriel James    Final Discharge Disposition Code: 50 - home with hospice

## 2024-12-31 NOTE — PROGRESS NOTES
"Enter Query Response Below      Query Response: Bacterial pneumonia unspecified              If applicable, please update the problem list.     Patient: Agnieszka Rizzo        : 1930  Account: [id number]           Admit Date: 2024        How to Respond to this query:       a. Click New Note     b. Answer query within the yellow box.                c. Update the Problem List, if applicable.      If you have any questions about this query contact me at: [email or web address]     Dr. Simpson,    Risk factors:  89-year-old female \"presented with shortness of breath and cough, she was just discharged two days ago after a hospital stay due to a compression fracture; she has a history of COPD, CHF and pulmonary hypertension\" per H&P & discharge summary.    Clinical indicators:  - Emergency room MD note & discharge summary documents \"Multifocal pneumonia.\"  - Hospitalist progress notes  &  document \"She is on antibiotics for COPD exacerbation and also pneumonia coverage.  Procalcitonin was not elevated initially but she did have leukocytosis.\"  -  Respiratory panel was negative.  Blood cultures showed \"no growth at 5 days.\"    Treatment: Unasyn IV () then switched to Rocephin IV ( to ), Symbicort inhaler, Doxycycline IV () then PO ( to ), Mucinex, Duo-Neb, Sodium chloride 7% nebulizer & Solu-Medrol IV to Prednisone PO.    Please clarify the type of pneumonia the patient was treated/monitored for:    - Bacterial pneumonia unspecified  - Other- specify ________    By submitting this query, we are merely seeking further clarification of documentation to accurately reflect all conditions that you are monitoring, evaluating, treating or that extend the hospitalization or utilize additional resources of care. Please utilize your independent clinical judgment when addressing the question(s) above.     This query and your response, once completed, will be entered " into the legal medical record.    Sincerely,  Navid Quiros RN   Clinical Documentation Integrity Program

## 2024-12-31 NOTE — PROGRESS NOTES
"Enter Query Response Below      Query Response: Chronic hypoxic resp failure not supported             If applicable, please update the problem list.     Patient: Agnieszka Rizzo        : 1930  Account: [id number]           Admit Date: 2024        How to Respond to this query:       a. Click New Note     b. Answer query within the yellow box.                c. Update the Problem List, if applicable.      If you have any questions about this query contact me at: [email or web address]     Dr. Simpson,     Risk factors:  89-year-old female \"presented with COPD with acute exacerbation, acute on chronic diastolic heart failure, acute on chronic hypoxic respiratory failure, chronic A-fib, dementia\" per discharge summary.    Clinical indicators:  - H&P documents \"Chronic hypoxic respiratory failure.\"  - Hospitalist progress notes ( to ) & discharge summary document \"acute on chronic hypoxic respiratory failure.\"  - Emergency room MD note documents \"Patient does not use any supplemental oxygen at baseline.\"  - H&P & discharge summary documents \"she uses oxygen at night.\"  - Case management note  \"oxygen HS oxygen only through [facility].\"    Treatment: Vital sign flow sheet: O2 4L () with sats %, O2 3L () with sats 98-99%, room air () with sats %, O2 2L ( & ) with sats %, room air () with sats % then O2 2L () with sats % during sleep and room air () with sats 100%.    After study was, chronic hypoxic respiratory failure clinically supported during this admission?    - Chronic hypoxic respiratory failure was supported with additional clinical indicators: ____________  - Chronic hypoxic respiratory failure was not supported   - Other- specify ____________  - Unable to determine      By submitting this query, we are merely seeking further clarification of documentation to accurately reflect all conditions that you are monitoring, " evaluating, treating or that extend the hospitalization or utilize additional resources of care. Please utilize your independent clinical judgment when addressing the question(s) above.     This query and your response, once completed, will be entered into the legal medical record.    Sincerely,  Navid Quiros RN   Clinical Documentation Integrity Program

## 2025-01-09 ENCOUNTER — TELEPHONE (OUTPATIENT)
Dept: NEUROSURGERY | Facility: CLINIC | Age: OVER 89
End: 2025-01-09

## 2025-01-09 NOTE — TELEPHONE ENCOUNTER
Caller: Michelle Zaldivar ([facility])    Relationship to patient: Emergency Contact    Best call back number: [phone]    Patient is needing: PATIENTS DAUGHTER CALLED TO CANCEL APPT. PATIENT IS NOW IN CARE OF HOSPICE-NEED TO CANCEL IMAGING APPT. SENDING TO OFFICE TO ADVISE THANK YOU

## (undated) DEVICE — SINGLE USE BIOPSY VALVE MAJ-210: Brand: SINGLE USE BIOPSY VALVE (STERILE)

## (undated) DEVICE — MSK AIRWY LARYNG LMA PILOT SZ4

## (undated) DEVICE — TRAP,MUCUS SPECIMEN, 80CC: Brand: MEDLINE

## (undated) DEVICE — ADAPT SWVL FIBROPTIC BRONCH

## (undated) DEVICE — SINGLE USE SUCTION VALVE MAJ-209: Brand: SINGLE USE SUCTION VALVE (STERILE)

## (undated) DEVICE — VITAL SIGNS™ JACKSON-REES CIRCUITS: Brand: VITAL SIGNS™

## (undated) DEVICE — TUBING, SUCTION, 1/4" X 10', STRAIGHT: Brand: MEDLINE

## (undated) DEVICE — SENSR O2 OXIMAX FNGR A/ 18IN NONSTR

## (undated) DEVICE — LN SMPL O2 NASL/ORL SMART/CAPNOLINE PLS A/

## (undated) DEVICE — ADAPT CLN BIOGUARD AIR/H2O DISP

## (undated) DEVICE — MSK AIRWY LARYNG LMA UNIQUE STD PK SZ4

## (undated) DEVICE — MSK O2 NONREBRTHR W/VNT LF ADULT 7FT

## (undated) DEVICE — BITEBLOCK OMNI BLOC